# Patient Record
Sex: FEMALE | Race: WHITE | Employment: OTHER | URBAN - NONMETROPOLITAN AREA
[De-identification: names, ages, dates, MRNs, and addresses within clinical notes are randomized per-mention and may not be internally consistent; named-entity substitution may affect disease eponyms.]

---

## 2021-12-17 LAB — SARS-COV-2: DETECTED

## 2021-12-18 ENCOUNTER — APPOINTMENT (OUTPATIENT)
Dept: CT IMAGING | Age: 71
DRG: 207 | End: 2021-12-18
Payer: MEDICARE

## 2021-12-18 ENCOUNTER — HOSPITAL ENCOUNTER (INPATIENT)
Age: 71
LOS: 27 days | Discharge: NURSING, REHAB OR LONG-TERM CARE | DRG: 207 | End: 2022-01-14
Attending: EMERGENCY MEDICINE | Admitting: PEDIATRICS
Payer: MEDICARE

## 2021-12-18 ENCOUNTER — APPOINTMENT (OUTPATIENT)
Dept: GENERAL RADIOLOGY | Age: 71
DRG: 207 | End: 2021-12-18
Payer: MEDICARE

## 2021-12-18 DIAGNOSIS — U07.1 COVID-19: ICD-10-CM

## 2021-12-18 DIAGNOSIS — R09.02 HYPOXIA: Primary | ICD-10-CM

## 2021-12-18 PROBLEM — J96.01 ACUTE HYPOXEMIC RESPIRATORY FAILURE DUE TO COVID-19 (HCC): Status: ACTIVE | Noted: 2021-12-18

## 2021-12-18 LAB
ALBUMIN SERPL-MCNC: 4 G/DL (ref 3.5–5.1)
ALLEN TEST: POSITIVE
ALLEN TEST: POSITIVE
ALP BLD-CCNC: 73 U/L (ref 38–126)
ALT SERPL-CCNC: 31 U/L (ref 11–66)
ANION GAP SERPL CALCULATED.3IONS-SCNC: 18 MEQ/L (ref 8–16)
AST SERPL-CCNC: 67 U/L (ref 5–40)
ATYPICAL LYMPHOCYTES: ABNORMAL %
BASE EXCESS (CALCULATED): -2.5 MMOL/L (ref -2.5–2.5)
BASE EXCESS (CALCULATED): -3.8 MMOL/L (ref -2.5–2.5)
BASOPHILS # BLD: 0.4 %
BASOPHILS ABSOLUTE: 0 THOU/MM3 (ref 0–0.1)
BILIRUB SERPL-MCNC: 0.3 MG/DL (ref 0.3–1.2)
BUN BLDV-MCNC: 17 MG/DL (ref 7–22)
C-REACTIVE PROTEIN: 6.91 MG/DL (ref 0–1)
CALCIUM SERPL-MCNC: 8.9 MG/DL (ref 8.5–10.5)
CHLORIDE BLD-SCNC: 93 MEQ/L (ref 98–111)
CO2: 20 MEQ/L (ref 23–33)
COLLECTED BY:: ABNORMAL
COLLECTED BY:: ABNORMAL
CREAT SERPL-MCNC: 0.8 MG/DL (ref 0.4–1.2)
DEVICE: ABNORMAL
DEVICE: ABNORMAL
EOSINOPHIL # BLD: 0 %
EOSINOPHILS ABSOLUTE: 0 THOU/MM3 (ref 0–0.4)
ERYTHROCYTE [DISTWIDTH] IN BLOOD BY AUTOMATED COUNT: 12.2 % (ref 11.5–14.5)
ERYTHROCYTE [DISTWIDTH] IN BLOOD BY AUTOMATED COUNT: 38.6 FL (ref 35–45)
FERRITIN: 3338 NG/ML (ref 10–291)
GFR SERPL CREATININE-BSD FRML MDRD: 71 ML/MIN/1.73M2
GLUCOSE BLD-MCNC: 339 MG/DL (ref 70–108)
HCO3: 20 MMOL/L (ref 23–28)
HCO3: 21 MMOL/L (ref 23–28)
HCT VFR BLD CALC: 42.5 % (ref 37–47)
HEMOGLOBIN: 14.7 GM/DL (ref 12–16)
IFIO2: 4
IFIO2: 5
IMMATURE GRANS (ABS): 0.1 THOU/MM3 (ref 0–0.07)
IMMATURE GRANULOCYTES: 1.8 %
LACTIC ACID, SEPSIS: 1.3 MMOL/L (ref 0.5–1.9)
LACTIC ACID, SEPSIS: 1.7 MMOL/L (ref 0.5–1.9)
LYMPHOCYTES # BLD: 13.6 %
LYMPHOCYTES ABSOLUTE: 0.7 THOU/MM3 (ref 1–4.8)
MCH RBC QN AUTO: 29.8 PG (ref 26–33)
MCHC RBC AUTO-ENTMCNC: 34.6 GM/DL (ref 32.2–35.5)
MCV RBC AUTO: 86.2 FL (ref 81–99)
MONOCYTES # BLD: 10.2 %
MONOCYTES ABSOLUTE: 0.6 THOU/MM3 (ref 0.4–1.3)
NUCLEATED RED BLOOD CELLS: 0 /100 WBC
O2 SATURATION: 86 %
O2 SATURATION: 91 %
OSMOLALITY CALCULATION: 277.6 MOSMOL/KG (ref 275–300)
PCO2: 33 MMHG (ref 35–45)
PCO2: 33 MMHG (ref 35–45)
PH BLOOD GAS: 7.39 (ref 7.35–7.45)
PH BLOOD GAS: 7.42 (ref 7.35–7.45)
PLATELET # BLD: 128 THOU/MM3 (ref 130–400)
PMV BLD AUTO: 10.7 FL (ref 9.4–12.4)
PO2: 51 MMHG (ref 71–104)
PO2: 60 MMHG (ref 71–104)
POTASSIUM REFLEX MAGNESIUM: 4.4 MEQ/L (ref 3.5–5.2)
PRO-BNP: 207 PG/ML (ref 0–900)
RBC # BLD: 4.93 MILL/MM3 (ref 4.2–5.4)
SCAN OF BLOOD SMEAR: NORMAL
SEG NEUTROPHILS: 74 %
SEGMENTED NEUTROPHILS ABSOLUTE COUNT: 4.1 THOU/MM3 (ref 1.8–7.7)
SODIUM BLD-SCNC: 131 MEQ/L (ref 135–145)
SOURCE, BLOOD GAS: ABNORMAL
SOURCE, BLOOD GAS: ABNORMAL
TOTAL PROTEIN: 7.3 G/DL (ref 6.1–8)
TROPONIN T: < 0.01 NG/ML
WBC # BLD: 5.5 THOU/MM3 (ref 4.8–10.8)

## 2021-12-18 PROCEDURE — 36415 COLL VENOUS BLD VENIPUNCTURE: CPT

## 2021-12-18 PROCEDURE — 82728 ASSAY OF FERRITIN: CPT

## 2021-12-18 PROCEDURE — 99284 EMERGENCY DEPT VISIT MOD MDM: CPT

## 2021-12-18 PROCEDURE — 86140 C-REACTIVE PROTEIN: CPT

## 2021-12-18 PROCEDURE — 80053 COMPREHEN METABOLIC PANEL: CPT

## 2021-12-18 PROCEDURE — 71275 CT ANGIOGRAPHY CHEST: CPT

## 2021-12-18 PROCEDURE — 93005 ELECTROCARDIOGRAM TRACING: CPT | Performed by: EMERGENCY MEDICINE

## 2021-12-18 PROCEDURE — 96361 HYDRATE IV INFUSION ADD-ON: CPT

## 2021-12-18 PROCEDURE — 1200000003 HC TELEMETRY R&B

## 2021-12-18 PROCEDURE — 83605 ASSAY OF LACTIC ACID: CPT

## 2021-12-18 PROCEDURE — 96365 THER/PROPH/DIAG IV INF INIT: CPT

## 2021-12-18 PROCEDURE — 82803 BLOOD GASES ANY COMBINATION: CPT

## 2021-12-18 PROCEDURE — 2700000000 HC OXYGEN THERAPY PER DAY

## 2021-12-18 PROCEDURE — 6360000002 HC RX W HCPCS: Performed by: NURSE PRACTITIONER

## 2021-12-18 PROCEDURE — 84484 ASSAY OF TROPONIN QUANT: CPT

## 2021-12-18 PROCEDURE — 6360000004 HC RX CONTRAST MEDICATION: Performed by: NURSE PRACTITIONER

## 2021-12-18 PROCEDURE — 85025 COMPLETE CBC W/AUTO DIFF WBC: CPT

## 2021-12-18 PROCEDURE — 83880 ASSAY OF NATRIURETIC PEPTIDE: CPT

## 2021-12-18 PROCEDURE — 2580000003 HC RX 258: Performed by: NURSE PRACTITIONER

## 2021-12-18 PROCEDURE — 99222 1ST HOSP IP/OBS MODERATE 55: CPT | Performed by: PHYSICIAN ASSISTANT

## 2021-12-18 PROCEDURE — 36600 WITHDRAWAL OF ARTERIAL BLOOD: CPT

## 2021-12-18 PROCEDURE — 71045 X-RAY EXAM CHEST 1 VIEW: CPT

## 2021-12-18 RX ORDER — SODIUM CHLORIDE 0.9 % (FLUSH) 0.9 %
5-40 SYRINGE (ML) INJECTION PRN
Status: DISCONTINUED | OUTPATIENT
Start: 2021-12-18 | End: 2021-12-20 | Stop reason: SDUPTHER

## 2021-12-18 RX ORDER — ZINC SULFATE 50(220)MG
50 CAPSULE ORAL DAILY
Status: DISCONTINUED | OUTPATIENT
Start: 2021-12-19 | End: 2021-12-20

## 2021-12-18 RX ORDER — INSULIN GLARGINE 100 [IU]/ML
10 INJECTION, SOLUTION SUBCUTANEOUS DAILY
Status: DISCONTINUED | OUTPATIENT
Start: 2021-12-18 | End: 2021-12-22

## 2021-12-18 RX ORDER — SODIUM CHLORIDE 0.9 % (FLUSH) 0.9 %
5-40 SYRINGE (ML) INJECTION EVERY 12 HOURS SCHEDULED
Status: DISCONTINUED | OUTPATIENT
Start: 2021-12-18 | End: 2021-12-20

## 2021-12-18 RX ORDER — ONDANSETRON 4 MG/1
4 TABLET, ORALLY DISINTEGRATING ORAL EVERY 8 HOURS PRN
Status: DISCONTINUED | OUTPATIENT
Start: 2021-12-18 | End: 2021-12-20 | Stop reason: SDUPTHER

## 2021-12-18 RX ORDER — POLYETHYLENE GLYCOL 3350 17 G/17G
17 POWDER, FOR SOLUTION ORAL DAILY PRN
Status: DISCONTINUED | OUTPATIENT
Start: 2021-12-18 | End: 2022-01-14 | Stop reason: HOSPADM

## 2021-12-18 RX ORDER — DEXTROSE MONOHYDRATE 50 MG/ML
100 INJECTION, SOLUTION INTRAVENOUS PRN
Status: DISCONTINUED | OUTPATIENT
Start: 2021-12-18 | End: 2022-01-14 | Stop reason: HOSPADM

## 2021-12-18 RX ORDER — GUAIFENESIN/DEXTROMETHORPHAN 100-10MG/5
5 SYRUP ORAL EVERY 4 HOURS PRN
Status: DISCONTINUED | OUTPATIENT
Start: 2021-12-18 | End: 2021-12-20

## 2021-12-18 RX ORDER — ONDANSETRON 2 MG/ML
4 INJECTION INTRAMUSCULAR; INTRAVENOUS EVERY 6 HOURS PRN
Status: DISCONTINUED | OUTPATIENT
Start: 2021-12-18 | End: 2021-12-20 | Stop reason: SDUPTHER

## 2021-12-18 RX ORDER — DEXTROSE MONOHYDRATE 25 G/50ML
12.5 INJECTION, SOLUTION INTRAVENOUS PRN
Status: DISCONTINUED | OUTPATIENT
Start: 2021-12-18 | End: 2022-01-14 | Stop reason: HOSPADM

## 2021-12-18 RX ORDER — 0.9 % SODIUM CHLORIDE 0.9 %
500 INTRAVENOUS SOLUTION INTRAVENOUS ONCE
Status: COMPLETED | OUTPATIENT
Start: 2021-12-18 | End: 2021-12-18

## 2021-12-18 RX ORDER — SODIUM CHLORIDE 9 MG/ML
25 INJECTION, SOLUTION INTRAVENOUS PRN
Status: DISCONTINUED | OUTPATIENT
Start: 2021-12-18 | End: 2021-12-20

## 2021-12-18 RX ORDER — VITAMIN B COMPLEX
2000 TABLET ORAL DAILY
Status: DISCONTINUED | OUTPATIENT
Start: 2021-12-19 | End: 2021-12-20

## 2021-12-18 RX ORDER — ACETAMINOPHEN 325 MG/1
650 TABLET ORAL EVERY 6 HOURS PRN
Status: DISCONTINUED | OUTPATIENT
Start: 2021-12-18 | End: 2021-12-20 | Stop reason: SDUPTHER

## 2021-12-18 RX ORDER — ASCORBIC ACID 500 MG
500 TABLET ORAL DAILY
Status: DISCONTINUED | OUTPATIENT
Start: 2021-12-19 | End: 2021-12-20

## 2021-12-18 RX ORDER — NICOTINE POLACRILEX 4 MG
15 LOZENGE BUCCAL PRN
Status: DISCONTINUED | OUTPATIENT
Start: 2021-12-18 | End: 2022-01-14 | Stop reason: HOSPADM

## 2021-12-18 RX ORDER — ACETAMINOPHEN 650 MG/1
650 SUPPOSITORY RECTAL EVERY 6 HOURS PRN
Status: DISCONTINUED | OUTPATIENT
Start: 2021-12-18 | End: 2021-12-20 | Stop reason: SDUPTHER

## 2021-12-18 RX ORDER — ALBUTEROL SULFATE 90 UG/1
2 AEROSOL, METERED RESPIRATORY (INHALATION) EVERY 6 HOURS PRN
Status: DISCONTINUED | OUTPATIENT
Start: 2021-12-18 | End: 2022-01-14 | Stop reason: HOSPADM

## 2021-12-18 RX ORDER — SODIUM CHLORIDE 9 MG/ML
INJECTION, SOLUTION INTRAVENOUS CONTINUOUS
Status: DISCONTINUED | OUTPATIENT
Start: 2021-12-18 | End: 2021-12-19

## 2021-12-18 RX ORDER — DEXAMETHASONE 4 MG/1
6 TABLET ORAL DAILY
Status: DISCONTINUED | OUTPATIENT
Start: 2021-12-19 | End: 2021-12-19

## 2021-12-18 RX ADMIN — IOPAMIDOL 80 ML: 755 INJECTION, SOLUTION INTRAVENOUS at 14:46

## 2021-12-18 RX ADMIN — DEXAMETHASONE SODIUM PHOSPHATE 20 MG: 4 INJECTION INTRA-ARTICULAR; INTRALESIONAL; INTRAMUSCULAR; INTRAVENOUS; SOFT TISSUE at 14:54

## 2021-12-18 RX ADMIN — SODIUM CHLORIDE 500 ML: 9 INJECTION, SOLUTION INTRAVENOUS at 13:54

## 2021-12-18 ASSESSMENT — ENCOUNTER SYMPTOMS
COLOR CHANGE: 0
NAUSEA: 0
WHEEZING: 0
DIARRHEA: 0
SHORTNESS OF BREATH: 1
CONSTIPATION: 0
VOMITING: 0
RHINORRHEA: 0
ABDOMINAL DISTENTION: 0
COUGH: 1
SORE THROAT: 0

## 2021-12-18 NOTE — ED TRIAGE NOTES
Pt to the ED with c/o low O2. Pt states she tested positive for COVID yesterday. States she went to [facility] ED where they wanted to admit her. States she refused. Pt states today her O2 sats were low so she came back. Upon arrival the pt appears SOB and breathing is labored. Pt O2 sat is 80% on room air after ambulating. Pt placed on 4LNC. O2 sat increased to 92%. EKG complete.

## 2021-12-18 NOTE — ED NOTES
Pt resting in bed. Requesting food at current time. Oxygen placed up to 5L. Pt dropping to 89% while talking.  Will monitor      Ector Kussmaul, RN  12/18/21 5212

## 2021-12-18 NOTE — ED PROVIDER NOTES
[facility] [address] [address] EMERGENCY DEPT  Faculty Attestation    I performed a history and physical examination of the patient and discussed management with the resident. I reviewed the residents note and agree with the documented findings and plan of care. Any areas of disagreement are noted on the chart. I was personally present for the key portions of any procedures and the inclusive time noted in any critical care statement. I have documented in the chart those procedures where I was not present during the key portions. I have reviewed the emergency nurses triage note. I agree with the chief complaint, past medical history, past surgical history, allergies, medications, social, and family history as documented unless otherwise noted below.        This is a 14-year-old female who presents to the emergency department for repeat evaluation  Patient was at an outside facility yesterday  She went there because she has not been feeling well for about 10 days  She describes a poor appetite, intermittent nausea, loose stool, and generalized fatigue  No focal neurologic deficits  Notes possible fever  She was diagnosed with Covid pneumonia yesterday as well as hypoxia  She refused to stay or be admitted  She states that she has not happy with her care there  Today she states her appetite has improved  She stopped at a local fast food restaurant and had a 'Kewpie special' prior to arrival  Denies any chest or abdominal discomfort  Continues to have a dry cough  No wheezing  No chest pain   No palpitations  No lower extremity edema  No genitourinary complaints  Review of systems otherwise negative  She has no additional concerns    On examination she appears in no acute distress  Heart is regular in rate and rhythm  Lungs show mild coarseness to breath sounds  Mild tachypnea  Mild conversational dyspnea  No retractions  Abdomen obese  Abdomen soft and nontender  Hyperactive bowel sounds  Normal peripheral perfusion and skin turgor  No pretibial pitting edema  No gross sensory or motor deficits    EKG at 1159 shows a sinus rhythm with rate of 80 bpm.  Intervals within normal limits. Normal axis. Poor R wave progression. No T wave inversion. No ST segment depression/elevation or evidence for acute ischemia/infarction.     Patient hypoxic and placed on oxygen with improved O2 sats and work of breathing    Imaging reviewed  Labs reviewed    Patient updated on all results  Patient will be admitted    Critical care time of 15 minutes      Fernando Hightower DO  Attending Emergency Physician        Sara Hathaway DO  12/18/21 5464

## 2021-12-19 LAB
EKG ATRIAL RATE: 88 BPM
EKG P AXIS: 47 DEGREES
EKG P-R INTERVAL: 140 MS
EKG Q-T INTERVAL: 390 MS
EKG QRS DURATION: 82 MS
EKG QTC CALCULATION (BAZETT): 471 MS
EKG R AXIS: 37 DEGREES
EKG T AXIS: 44 DEGREES
EKG VENTRICULAR RATE: 88 BPM
GLUCOSE BLD-MCNC: 482 MG/DL (ref 70–108)

## 2021-12-19 PROCEDURE — 82948 REAGENT STRIP/BLOOD GLUCOSE: CPT

## 2021-12-19 PROCEDURE — 6360000002 HC RX W HCPCS: Performed by: PHYSICIAN ASSISTANT

## 2021-12-19 PROCEDURE — 94660 CPAP INITIATION&MGMT: CPT

## 2021-12-19 PROCEDURE — 6360000002 HC RX W HCPCS: Performed by: NURSE PRACTITIONER

## 2021-12-19 PROCEDURE — 2580000003 HC RX 258: Performed by: NURSE PRACTITIONER

## 2021-12-19 PROCEDURE — 2580000003 HC RX 258: Performed by: PHYSICIAN ASSISTANT

## 2021-12-19 PROCEDURE — 2060000000 HC ICU INTERMEDIATE R&B

## 2021-12-19 PROCEDURE — 99232 SBSQ HOSP IP/OBS MODERATE 35: CPT | Performed by: NURSE PRACTITIONER

## 2021-12-19 PROCEDURE — 1200000003 HC TELEMETRY R&B

## 2021-12-19 PROCEDURE — 6370000000 HC RX 637 (ALT 250 FOR IP): Performed by: PHYSICIAN ASSISTANT

## 2021-12-19 RX ADMIN — Medication 2000 UNITS: at 09:38

## 2021-12-19 RX ADMIN — SODIUM CHLORIDE, PRESERVATIVE FREE 10 ML: 5 INJECTION INTRAVENOUS at 21:19

## 2021-12-19 RX ADMIN — ENOXAPARIN SODIUM 30 MG: 100 INJECTION SUBCUTANEOUS at 09:40

## 2021-12-19 RX ADMIN — INSULIN LISPRO 6 UNITS: 100 INJECTION, SOLUTION INTRAVENOUS; SUBCUTANEOUS at 16:51

## 2021-12-19 RX ADMIN — BARICITINIB 4 MG: 2 TABLET, FILM COATED ORAL at 00:22

## 2021-12-19 RX ADMIN — BARICITINIB 4 MG: 2 TABLET, FILM COATED ORAL at 22:24

## 2021-12-19 RX ADMIN — DEXAMETHASONE SODIUM PHOSPHATE 14 MG: 4 INJECTION INTRA-ARTICULAR; INTRALESIONAL; INTRAMUSCULAR; INTRAVENOUS; SOFT TISSUE at 09:46

## 2021-12-19 RX ADMIN — ENOXAPARIN SODIUM 30 MG: 100 INJECTION SUBCUTANEOUS at 21:18

## 2021-12-19 RX ADMIN — OXYCODONE HYDROCHLORIDE AND ACETAMINOPHEN 500 MG: 500 TABLET ORAL at 09:39

## 2021-12-19 RX ADMIN — SODIUM CHLORIDE, PRESERVATIVE FREE 10 ML: 5 INJECTION INTRAVENOUS at 00:25

## 2021-12-19 RX ADMIN — SODIUM CHLORIDE: 9 INJECTION, SOLUTION INTRAVENOUS at 00:29

## 2021-12-19 RX ADMIN — INSULIN GLARGINE 10 UNITS: 100 INJECTION, SOLUTION SUBCUTANEOUS at 08:03

## 2021-12-19 RX ADMIN — SODIUM CHLORIDE, PRESERVATIVE FREE 10 ML: 5 INJECTION INTRAVENOUS at 09:41

## 2021-12-19 RX ADMIN — ENOXAPARIN SODIUM 30 MG: 100 INJECTION SUBCUTANEOUS at 00:25

## 2021-12-19 RX ADMIN — INSULIN LISPRO 5 UNITS: 100 INJECTION, SOLUTION INTRAVENOUS; SUBCUTANEOUS at 12:09

## 2021-12-19 RX ADMIN — INSULIN LISPRO 4 UNITS: 100 INJECTION, SOLUTION INTRAVENOUS; SUBCUTANEOUS at 08:05

## 2021-12-19 RX ADMIN — Medication 50 MG: at 09:39

## 2021-12-19 RX ADMIN — INSULIN LISPRO 5 UNITS: 100 INJECTION, SOLUTION INTRAVENOUS; SUBCUTANEOUS at 16:53

## 2021-12-19 NOTE — ED NOTES
Patient transported to [facility]  by cart in stable condition. Patient monitored on cardiac telemetry. Patient on4  LPM O2 via nasal canula. IV  line is patent.    Full PP precautions       Nataly Tolliver, EMT-P  12/18/21 2024

## 2021-12-19 NOTE — H&P
Hospitalist History and Physical          Patient: David Turk  : 1950  MRN: [id number]     Acct: [de-identified]    PCP: Bar Millan  Date of Admission: 2021  Date of Service: Pt seen/examined on 21  and Admitted to Inpatient with expected LOS greater than two midnights due to medical therapy. Assessment/Plan:    Acute hypoxemic respiratory failure 2/2 COVID19   Currently on 6 Liters NC  CTA chest and CXR consist with COVID19 PNA   Elevated inflammatory markers CRP 6.91 , ferritin >3000  vitamin C, vitamin D and zinc   Decadron 6 mg    baricitnib 4 mg daily  IS, acapella, and albuterol       Type II diabetes hyperglycemia   PO medications held   - goal 140 - 180   Lantus 10 units daily, 5 units humalog with low SSI   No A1C on file- ordered   hypoglycemia protocol   4 carb choice diet     Thrombocytopenia  Platelets 291  Monitor with CBC     CKD stage II  GFR 71- monitor     CAD  Hx of stent placement   Unclear what medications currently on     Essential hypertension  Resume home medications-  need to be reconciled   PRN hydralazine    Class II obesity   BMI 37.73 kg/m2    =======================================================================      Chief Complaint:  Asymptomatic Hypoxia     History Of Present Illness:  David Turk is a 73 y.o. female with PMHx of CAD, essential hypertension, type II diabetes, who presents to [address] [place] [address] with shortness of breath. Patient states she began having symptoms on  of loss of appetite, changes in taste and smell, food aversions, nausea with dry heaves and diarrhea. she originally attributed these symptoms to changes in her diabetic medications. As symptoms persisted, patient states she was advised by her PCP to go to ED at [facility] for workup and found to be COVID positive with hypoxia 70% spo2.    She decided to leave [facility] per their recommendation she be admitted to their facility as she preferred to be admitted to [facility] for treatment and family close by. She states she has felt okay in spite of measured hypoxia. She denies fevers, chills, chest pain, dizziness, numbness or tingling, abdominal pain          ED course:   Vitals on arrival 98.5F, RR 28, HR 95, /77  Labs: Lactic acid, CBC, ABG, Ferritin, CRP   EKG, CXR, CTA chest    Past Medical History:    No past medical history on file. Past Surgical History:    No past surgical history on file. Medications Prior to Admission:   Prior to Admission medications    Not on File       Allergies:  Patient has no known allergies. Social History:    The patient currently lives at home. Tobacco use:   has no history on file for tobacco use. Alcohol use:   has no history on file for alcohol use. Drug use:  has no history on file for drug use. Family History:  as follows:  No family history on file. Review of Systems:   Pertinent positives and negatives as noted in the HPI. All other systems reviewed and negative. Physical Exam:    /60   Pulse 74   Temp 98.5 °F (36.9 °C)   Resp 24   Wt 204 lb (92.5 kg)   SpO2 93%       General appearance: No apparent distress, well developed, appears stated age. Eyes:  Pupils equal, round, and reactive to light. Conjunctivae/corneas clear. HENT: Head normal in appearance. External nares normal.  Oral mucosa moist without lesions. Hearing grossly intact. Neck: Supple, with full range of motion. Trachea midline. No gross JVD appreciated. Respiratory: 2L NC Normal respiratory effort. Mild wheezing and crackles diminished breath sounds  Cardiovascular: Normal rate, regular rhythm with normal S1/S2 without murmurs. No lower extremity edema. Abdomen: Soft, non-tender, non-distended with normal bowel sounds. Musculoskeletal: There is no joint swelling or tenderness. Normal tone. No abnormal movements. Skin: Warm and dry. No rashes or lesions.   Neurologic:  No focal sensory/motor deficits in the upper and lower extremities. Cranial nerves:  grossly non-focal 2-12. Psychiatric: Alert and oriented, normal insight and thought content. Capillary Refill: Brisk,< 3 seconds. Peripheral Pulses: +2 palpable, equal bilaterally. Labs:     Recent Labs     12/18/21  1215   WBC 5.5   HGB 14.7   HCT 42.5   *     Recent Labs     12/18/21  1215   *   K 4.4   CL 93*   CO2 20*   BUN 17   CREATININE 0.8   CALCIUM 8.9     Recent Labs     12/18/21  1215   AST 67*   ALT 31   BILITOT 0.3   ALKPHOS 73     No results for input(s): INR in the last 72 hours. No results for input(s): Meldon Becerra in the last 72 hours. No results found for: Karely Speedy, BACTERIA, RBCUA, BLOODU, [place], GLUCOSEU      Radiology:     CTA CHEST W WO CONTRAST   Final Result      1. No evidence of pulmonary embolism. 2. Areas of abnormal density throughout both lung fields consistent with inflammatory process, presumably Covid 19 infection. 3. Mild cardiomegaly. 4. Mild dilatation of the ascending aorta. 5. Thoracic spondylosis. 6. Hiatal hernia. 7. Hepatosplenomegaly. **This report has been created using voice recognition software. It may contain minor errors which are inherent in voice recognition technology. **      Final report electronically signed by DR April Mcelroy on 12/18/2021 3:02 PM      XR CHEST PORTABLE   Final Result   1. Diffuse groundglass opacification of both lungs with areas of interstitial thickening and nodular consolidative opacities. Findings likely relate to multilobar pneumonia. Pulmonary edema could also give this appearance. **This report has been created using voice recognition software. It may contain minor errors which are inherent in voice recognition technology. **      Final report electronically signed by Dr Shaka Braun on 12/18/2021 2:20 PM             EKG:  I have reviewed the EKG with the following interpretation: normal sinus rhythm      PT/OT

## 2021-12-19 NOTE — PROGRESS NOTES
Hospitalist Progress Note    Patient:  Tone Vera      Unit/Bed:8B-26/026-A    YOB: 1950    MRN: [id number]       Acct: [de-identified]     PCP: Jesse Jasso    Date of Admission: 12/18/2021    Assessment/Plan:    1. Pneumonia secondary to COVID-19 infection--CRP noted to be 6.91; baricitinib 12/19; was given Decadron 20 mg IV x1 dose on 12/18 and converted to 6 mg daily on 12/19 however since the patient is currently on high flow we'll convert to high-dose prednisone and given additional 14 mg today; on vitamin C, vitamin D and zinc; has Acapella and incentive spirometry; instructed on self proning  2. Acute hypoxic respiratory failure--on high flow oxygen; wean as able, has Acapella and incentive spirometry; CTA of the chest done and no PE  3. Hyponatremia, mild--monitor; check in the morning; stop IV fluids  4. Diabetes mellitus type 2, uncontrolled--Lantus along with low-dose sliding scale; also uses 5 units Premeal Humalog; monitor  5. Thrombocytopenia, mild--likely viral etiology, monitor with #6  6. Hepatosplenomegaly  7. Obesity with BMI 37.73      Expected discharge date: Pending clinical course    Disposition:    [x] Home       [] TCU       [] Rehab       [] Psych       [] SNF       [] [place]       [] Other-    Chief Complaint: Shortness of breath    Hospital Course: Per H&P dated 12/18: Faye Blackman is a 73 y.o. female with PMHx of CAD, essential hypertension, type II diabetes, who presents to [facility] with shortness of breath. Patient states she began having symptoms on 12/12 of loss of appetite, changes in taste and smell, food aversions, nausea with dry heaves and diarrhea. she originally attributed these symptoms to changes in her diabetic medications. As symptoms persisted, patient states she was advised by her PCP to go to ED at [facility] for workup and found to be COVID positive with hypoxia 70% spo2.    She decided to leave [facility] per their recommendation she be admitted to their facility as she preferred to be admitted to [facility] for treatment and family close by. She states she has felt okay in spite of measured hypoxia. She denies fevers, chills, chest pain, dizziness, numbness or tingling, abdominal pain      ED course:   Vitals on arrival 98.5F, RR 28, HR 95, /77  Labs: Lactic acid, CBC, ABG, Ferritin, CRP   EKG, CXR, CTA chest\"    12/19--> now on high flow however settings not documented as patient desatted to 83% on 6 L; stop IV fluids     Subjective (past 24 hours): Sitting up eating, relates to diarrhea the past 2 days, denies pain    Medications:  Reviewed    Infusion Medications    sodium chloride      sodium chloride 75 mL/hr at 12/19/21 0029    dextrose       Scheduled Medications    sodium chloride flush  5-40 mL IntraVENous 2 times per day    enoxaparin  30 mg SubCUTAneous Q12H    Vitamin D  2,000 Units Oral Daily    ascorbic acid  500 mg Oral Daily    zinc sulfate  50 mg Oral Daily    dexamethasone  6 mg Oral Daily    insulin glargine  10 Units SubCUTAneous Daily    insulin lispro  0-6 Units SubCUTAneous TID WC    insulin lispro  0-3 Units SubCUTAneous Nightly    insulin lispro  5 Units SubCUTAneous Once    Followed by    insulin lispro  5 Units SubCUTAneous TID WC    baricitinib  4 mg Oral Nightly     PRN Meds: sodium chloride flush, sodium chloride, ondansetron **OR** ondansetron, polyethylene glycol, acetaminophen **OR** acetaminophen, guaiFENesin-dextromethorphan, glucose, dextrose, glucagon (rDNA), dextrose, albuterol sulfate HFA      Intake/Output Summary (Last 24 hours) at 12/19/2021 0750  Last data filed at 12/19/2021 0422  Gross per 24 hour   Intake 240 ml   Output --   Net 240 ml       Diet:  ADULT DIET;  Regular; 4 carb choices (60 gm/meal)    Exam:  /73   Pulse 78   Temp 98.5 °F (36.9 °C) (Oral)   Resp 22   Ht 5' 2\" (1.575 m)   Wt 206 lb 4.8 oz (93.6 kg)   SpO2 94%   BMI 37.73 kg/m²     General appearance: No apparent distress, appears stated age and cooperative. HEENT: Pupils equal, round, and reactive to light. Conjunctivae/corneas clear. Neck: Supple, with full range of motion. No jugular venous distention. Trachea midline. Respiratory:  Normal respiratory effort. Clear to auscultation, bilaterally without Rales/Wheezes/Rhonchi. Speaks in complete sentences  Cardiovascular: Regular rate and rhythm with normal S1/S2 without murmurs, rubs or gallops. Abdomen: Soft, non-tender, non-distended with normal bowel sounds. Obese  Musculoskeletal: passive and active ROM x 4 extremities. Skin: Skin color, texture, turgor normal.    Neurologic:  Neurovascularly intact without any focal sensory/motor deficits. Cranial nerves: II-XII intact, grossly non-focal.  Psychiatric: Alert and oriented, thought content appropriate  Capillary Refill: Brisk,< 3 seconds   Peripheral Pulses: +2 palpable, equal bilaterally       Labs:   Recent Labs     12/18/21  1215   WBC 5.5   HGB 14.7   HCT 42.5   *     Recent Labs     12/18/21  1215   *   K 4.4   CL 93*   CO2 20*   BUN 17   CREATININE 0.8   CALCIUM 8.9     Recent Labs     12/18/21  1215   AST 67*   ALT 31   BILITOT 0.3   ALKPHOS 73     Microbiology:    COVID-19 positive on 12/18 at [facility]    Radiology:  CTA CHEST W WO CONTRAST    Result Date: 12/18/2021  PROCEDURE: CTA CHEST W WO CONTRAST CLINICAL INFORMATION: shortness of breath. COMPARISON: No prior study. TECHNIQUE: 3 mm axial images were obtained through the chest after the administration of IV contrast.  A non-contrast localizer was obtained. 3D reconstructions were performed on the scanner to include MIP coronal and sagittal images through the chest. Isovue was the intravenous contrast utilized.  All CT scans at this facility use dose modulation, iterative reconstruction, and/or weight-based dosing when appropriate to reduce radiation dose to as low as reasonably achievable. FINDINGS: There is adequate opacification of the pulmonary arterial system. No pulmonary emboli are present. There is atherosclerotic calcification in the aortic arch. . There is mild dilatation of the ascending aorta which measures 3.1 cm in diameter. There is mild cardiomegaly. There is no pericardial or pleural effusion. There is no mediastinal, axillary or hilar adenopathy. There are areas of abnormal density throughout both lung fields consistent with inflammatory process, presumably Covid 19 infection. . There is thoracic spondylosis. .  There is hepatosplenomegaly. .     1. No evidence of pulmonary embolism. 2. Areas of abnormal density throughout both lung fields consistent with inflammatory process, presumably Covid 19 infection. 3. Mild cardiomegaly. 4. Mild dilatation of the ascending aorta. 5. Thoracic spondylosis. 6. Hiatal hernia. 7. Hepatosplenomegaly. **This report has been created using voice recognition software. It may contain minor errors which are inherent in voice recognition technology. ** Final report electronically signed by DR Talon Atwood on 12/18/2021 3:02 PM    XR CHEST PORTABLE    Result Date: 12/18/2021  PROCEDURE: XR CHEST PORTABLE CLINICAL INFORMATION: SOB COMPARISON: None TECHNIQUE: AP portable chest radiograph performed. FINDINGS: Diffuse groundglass opacification of both lungs with areas of interstitial thickening and nodular consolidative opacities. Cardiac silhouette is mildly enlarged. No pleural effusion. No pneumothorax. No acute bony abnormality. Degenerative changes of the thoracic spine. 1. Diffuse groundglass opacification of both lungs with areas of interstitial thickening and nodular consolidative opacities. Findings likely relate to multilobar pneumonia. Pulmonary edema could also give this appearance. **This report has been created using voice recognition software. It may contain minor errors which are inherent in voice recognition technology. ** Final report electronically signed by Dr Gem Eden on 12/18/2021 2:20 PM      DVT prophylaxis: [x] Lovenox                                 [] SCDs                                 [] SQ Heparin                                 [] Encourage ambulation           [] Already on Anticoagulation     Code Status: Full Code    PT/OT Eval Status: Monitor    Tele:   [x] yes sinus rhythm heart rate 81             [] no    Active Hospital Problems    Diagnosis Date Noted    Acute hypoxemic respiratory failure due to COVID-19 [facility]) [U07.1, J96.01] 12/18/2021       Electronically signed by WYATT Hinds CNP on 12/19/2021 at 7:50 AM

## 2021-12-19 NOTE — ED PROVIDER NOTES
[facility] ENCOUNTER          Pt Name: Merlin Barrs  MRN: [id number]  [place] 1950  Date of evaluation: 12/18/2021  Treating Resident Physician: Dolly Mtz MD  Supervising Physician: Latrice Douglas DO    CHIEF COMPLAINT       Chief Complaint   Patient presents with    Other     low O2     History obtained from the patient. HISTORY OF PRESENT ILLNESS    HPI  Merlin Barrs is a 73 y.o. female who presents to the emergency department for evaluation of this of breath. Patient states that she has been short of breath for the last 7 days but has become increasingly worse for the last 2 days. Patient states she was seen at another hospital yesterday and diagnosed with COVID-19. Patient states that they initially went to tell her to be admitted but she refused and left [address] Patient states that she continues to be short of breath today. Patient on arrival had a pulse ox of 70% on room air. She denies any chest pain or syncope. The patient has no other acute complaints at this time. REVIEW OF SYSTEMS   Review of Systems   Constitutional: Positive for fatigue. Negative for fever. HENT: Negative for ear pain, rhinorrhea and sore throat. Respiratory: Positive for cough and shortness of breath. Negative for wheezing. Cardiovascular: Negative for chest pain, palpitations and leg swelling. Gastrointestinal: Negative for abdominal distention, constipation, diarrhea, nausea and vomiting. Endocrine: Negative for polydipsia and polyuria. Genitourinary: Negative for difficulty urinating and dysuria. Musculoskeletal: Negative for arthralgias. Skin: Negative for color change, pallor and rash. Neurological: Negative for dizziness, seizures, syncope, weakness and numbness. PAST MEDICAL AND SURGICAL HISTORY   No past medical history on file. No past surgical history on file.       MEDICATIONS   No current facility-administered medications for this encounter. No current outpatient medications on file. SOCIAL HISTORY     Social History     Social History Narrative    Not on file     Social History     Tobacco Use    Smoking status: Not on file    Smokeless tobacco: Not on file   Substance Use Topics    Alcohol use: Not on file    Drug use: Not on file         ALLERGIES   No Known Allergies      FAMILY HISTORY   No family history on file. PREVIOUS RECORDS   Previous records reviewed: today    PHYSICAL EXAM     ED Triage Vitals [12/18/21 1201]   BP Temp Temp src Pulse Resp SpO2 Height Weight   135/77 98.5 °F (36.9 °C) -- 95 28 (S) (!) 80 % -- 204 lb (92.5 kg)     Initial vital signs and nursing assessment reviewed and normal. There is no height or weight on file to calculate BMI. Pulsoximetry is abnormal per my interpretation. Additional Vital Signs:  Vitals:    12/18/21 1926   BP: 108/60   Pulse: 74   Resp: 24   Temp:    SpO2: 93%       Physical Exam  Constitutional:       Appearance: Normal appearance. HENT:      Head: Normocephalic. Right Ear: External ear normal.      Left Ear: External ear normal.      Nose: Nose normal.      Mouth/Throat:      Mouth: Mucous membranes are moist.      Pharynx: Oropharynx is clear. Eyes:      Extraocular Movements: Extraocular movements intact. Conjunctiva/sclera: Conjunctivae normal.      Pupils: Pupils are equal, round, and reactive to light. Cardiovascular:      Rate and Rhythm: Normal rate and regular rhythm. Pulses: Normal pulses. Heart sounds: Normal heart sounds. Pulmonary:      Breath sounds: Wheezing present. Abdominal:      General: Bowel sounds are normal.      Palpations: Abdomen is soft. Musculoskeletal:         General: Normal range of motion. Cervical back: Normal range of motion and neck supple. Skin:     General: Skin is warm and dry. Capillary Refill: Capillary refill takes less than 2 seconds. Neurological:      General: No focal deficit present. Mental Status: She is alert and oriented to person, place, and time. MEDICAL DECISION MAKING   Initial Assessment:   Patient is a 72-year-old female with recent diagnosis of COVID-19 who presents today with complaint of shortness of breath. Patient on arrival had a pulse ox of 70% requiring O2 on nonrebreather and then titrating down to O2 at 6 L per nasal cannula. Patient differential diagnosis includes but is not limited to COVID-19, viral illness, pneumonia, PE, acute respiratory distress with hypoxia.   Plan:   Labs  Chest x-ray  EKG  O2 to maintain pulse ox greater than 94% on room air  ABG        ED RESULTS   Laboratory results:  Labs Reviewed   CBC WITH AUTO DIFFERENTIAL - Abnormal; Notable for the following components:       Result Value    Platelets 176 (*)     Lymphocytes Absolute 0.7 (*)     Immature Grans (Abs) 0.10 (*)     All other components within normal limits   COMPREHENSIVE METABOLIC PANEL W/ REFLEX TO MG FOR LOW K - Abnormal; Notable for the following components:    Glucose 339 (*)     Sodium 131 (*)     Chloride 93 (*)     CO2 20 (*)     AST 67 (*)     All other components within normal limits   ANION GAP - Abnormal; Notable for the following components:    Anion Gap 18.0 (*)     All other components within normal limits   GLOMERULAR FILTRATION RATE, ESTIMATED - Abnormal; Notable for the following components:    Est, Glom Filt Rate 71 (*)     All other components within normal limits   C-REACTIVE PROTEIN - Abnormal; Notable for the following components:    CRP 6.91 (*)     All other components within normal limits   BLOOD GAS, ARTERIAL - Abnormal; Notable for the following components:    PCO2 33 (*)     PO2 51 (*)     HCO3 21 (*)     All other components within normal limits   FERRITIN - Abnormal; Notable for the following components:    Ferritin 3,338 (*)     All other components within normal limits   BLOOD GAS, ARTERIAL - Abnormal; Notable for the following components:    PCO2 33 (*)     PO2 60 (*)     HCO3 20 (*)     Base Excess (Calculated) -3.8 (*)     All other components within normal limits   TROPONIN   LACTATE, SEPSIS   LACTATE, SEPSIS   OSMOLALITY   BRAIN NATRIURETIC PEPTIDE   SCAN OF BLOOD SMEAR       Radiologic studies results:  CTA CHEST W WO CONTRAST   Final Result      1. No evidence of pulmonary embolism. 2. Areas of abnormal density throughout both lung fields consistent with inflammatory process, presumably Covid 19 infection. 3. Mild cardiomegaly. 4. Mild dilatation of the ascending aorta. 5. Thoracic spondylosis. 6. Hiatal hernia. 7. Hepatosplenomegaly. **This report has been created using voice recognition software. It may contain minor errors which are inherent in voice recognition technology. **      Final report electronically signed by DR Linda Tamez on 12/18/2021 3:02 PM      XR CHEST PORTABLE   Final Result   1. Diffuse groundglass opacification of both lungs with areas of interstitial thickening and nodular consolidative opacities. Findings likely relate to multilobar pneumonia. Pulmonary edema could also give this appearance. **This report has been created using voice recognition software. It may contain minor errors which are inherent in voice recognition technology. **      Final report electronically signed by Dr Osman Cormier on 12/18/2021 2:20 PM          ED Medications administered this visit:   Medications   0.9 % sodium chloride bolus (0 mLs IntraVENous Stopped 12/18/21 1554)   dexamethasone (DECADRON) 20 mg in sodium chloride 0.9 % IVPB (0 mg IntraVENous Stopped 12/18/21 1524)   iopamidol (ISOVUE-370) 76 % injection 80 mL (80 mLs IntraVENous Given 12/18/21 1446)         ED COURSE        Patient on room air is only maintaining a pulse ox of 80% patient requiring O2 at 6 L at this time. Patient CTA of chest showed no PE.   Patient symptoms are secondary to COVID-19 and chest x-ray findings are consistent with this. Patient will be admitted to hospital at this time for further evaluation and treatment. MEDICATION CHANGES     New Prescriptions    No medications on file         FINAL DISPOSITION     Final diagnoses:   Hypoxia   COVID-19     Condition: condition: fair  Dispo: Admit to telemetry      This transcription was electronically signed. Parts of this transcriptions may have been dictated by use of voice recognition software and electronically transcribed, and parts may have been transcribed with the assistance of an ED scribe. The transcription may contain errors not detected in proofreading. Please refer to my supervising physician's documentation if my documentation differs.     Electronically Signed: Lani Ryan MD, 12/18/21, 8:21 PM         Lani Ryan MD  Resident  12/18/21 2174

## 2021-12-19 NOTE — PROGRESS NOTES
Patient alert and oriented. Displays of anxiety throughout the shift. No voiced complaints of pain or discomfort.   Updates given to family member

## 2021-12-20 ENCOUNTER — APPOINTMENT (OUTPATIENT)
Dept: GENERAL RADIOLOGY | Age: 71
DRG: 207 | End: 2021-12-20
Payer: MEDICARE

## 2021-12-20 LAB
ALBUMIN SERPL-MCNC: 3.5 G/DL (ref 3.5–5.1)
ALLEN TEST: POSITIVE
ALP BLD-CCNC: 71 U/L (ref 38–126)
ALT SERPL-CCNC: 22 U/L (ref 11–66)
ANION GAP SERPL CALCULATED.3IONS-SCNC: 12 MEQ/L (ref 8–16)
ANION GAP SERPL CALCULATED.3IONS-SCNC: 15 MEQ/L (ref 8–16)
AST SERPL-CCNC: 40 U/L (ref 5–40)
BACTERIA: ABNORMAL
BASE EXCESS (CALCULATED): 1.7 MMOL/L (ref -2.5–2.5)
BILIRUB SERPL-MCNC: 0.3 MG/DL (ref 0.3–1.2)
BILIRUBIN URINE: NEGATIVE
BLOOD, URINE: NEGATIVE
BUN BLDV-MCNC: 21 MG/DL (ref 7–22)
BUN BLDV-MCNC: 21 MG/DL (ref 7–22)
C-REACTIVE PROTEIN: 5.9 MG/DL (ref 0–1)
C-REACTIVE PROTEIN: 7.91 MG/DL (ref 0–1)
CALCIUM SERPL-MCNC: 9.1 MG/DL (ref 8.5–10.5)
CALCIUM SERPL-MCNC: 9.1 MG/DL (ref 8.5–10.5)
CASTS: ABNORMAL /LPF
CASTS: ABNORMAL /LPF
CHARACTER, URINE: CLEAR
CHLORIDE BLD-SCNC: 106 MEQ/L (ref 98–111)
CHLORIDE BLD-SCNC: 106 MEQ/L (ref 98–111)
CO2: 22 MEQ/L (ref 23–33)
CO2: 23 MEQ/L (ref 23–33)
COLLECTED BY:: ABNORMAL
COLOR: YELLOW
CREAT SERPL-MCNC: 0.6 MG/DL (ref 0.4–1.2)
CREAT SERPL-MCNC: 0.7 MG/DL (ref 0.4–1.2)
CRYSTALS: ABNORMAL
DEVICE: ABNORMAL
EPITHELIAL CELLS, UA: ABNORMAL /HPF
ERYTHROCYTE [DISTWIDTH] IN BLOOD BY AUTOMATED COUNT: 12.4 % (ref 11.5–14.5)
ERYTHROCYTE [DISTWIDTH] IN BLOOD BY AUTOMATED COUNT: 40 FL (ref 35–45)
FERRITIN: 2775 NG/ML (ref 10–291)
GFR SERPL CREATININE-BSD FRML MDRD: 83 ML/MIN/1.73M2
GFR SERPL CREATININE-BSD FRML MDRD: > 90 ML/MIN/1.73M2
GLUCOSE BLD-MCNC: 241 MG/DL (ref 70–108)
GLUCOSE BLD-MCNC: 258 MG/DL (ref 70–108)
GLUCOSE BLD-MCNC: 280 MG/DL (ref 70–108)
GLUCOSE BLD-MCNC: 281 MG/DL (ref 70–108)
GLUCOSE BLD-MCNC: 299 MG/DL (ref 70–108)
GLUCOSE BLD-MCNC: 365 MG/DL (ref 70–108)
GLUCOSE BLD-MCNC: 398 MG/DL (ref 70–108)
GLUCOSE, URINE: 500 MG/DL
HCO3: 27 MMOL/L (ref 23–28)
HCT VFR BLD CALC: 40.3 % (ref 37–47)
HEMOGLOBIN: 13.6 GM/DL (ref 12–16)
IFIO2: 100
KETONES, URINE: ABNORMAL
LD: 874 U/L (ref 100–190)
LEUKOCYTE EST, POC: NEGATIVE
MCH RBC QN AUTO: 29.9 PG (ref 26–33)
MCHC RBC AUTO-ENTMCNC: 33.7 GM/DL (ref 32.2–35.5)
MCV RBC AUTO: 88.6 FL (ref 81–99)
MISCELLANEOUS LAB TEST RESULT: ABNORMAL
MODE: ABNORMAL
NITRITE, URINE: NEGATIVE
O2 SATURATION: 99 %
PCO2: 43 MMHG (ref 35–45)
PH BLOOD GAS: 7.4 (ref 7.35–7.45)
PH UA: 5.5 (ref 5–9)
PLATELET # BLD: 144 THOU/MM3 (ref 130–400)
PMV BLD AUTO: 10.3 FL (ref 9.4–12.4)
PO2: 117 MMHG (ref 71–104)
POTASSIUM REFLEX MAGNESIUM: 5.5 MEQ/L (ref 3.5–5.2)
POTASSIUM SERPL-SCNC: 4.6 MEQ/L (ref 3.5–5.2)
POTASSIUM SERPL-SCNC: 5.2 MEQ/L (ref 3.5–5.2)
PROCALCITONIN: 0.06 NG/ML (ref 0.01–0.09)
PROCALCITONIN: 0.06 NG/ML (ref 0.01–0.09)
PROTEIN UA: 30 MG/DL
RBC # BLD: 4.55 MILL/MM3 (ref 4.2–5.4)
RBC URINE: ABNORMAL /HPF
RENAL EPITHELIAL, UA: ABNORMAL
SET PEEP: 16 MMHG
SET PRESS SUPP: 20 CMH2O
SET RESPIRATORY RATE: 16 BPM
SODIUM BLD-SCNC: 141 MEQ/L (ref 135–145)
SODIUM BLD-SCNC: 143 MEQ/L (ref 135–145)
SOURCE, BLOOD GAS: ABNORMAL
SPECIFIC GRAVITY UA: 1.03 (ref 1–1.03)
TOTAL PROTEIN: 6 G/DL (ref 6.1–8)
UROBILINOGEN, URINE: 1 EU/DL (ref 0–1)
WBC # BLD: 9.7 THOU/MM3 (ref 4.8–10.8)
WBC UA: ABNORMAL /HPF
YEAST: ABNORMAL

## 2021-12-20 PROCEDURE — 6360000002 HC RX W HCPCS: Performed by: NURSE PRACTITIONER

## 2021-12-20 PROCEDURE — 94660 CPAP INITIATION&MGMT: CPT

## 2021-12-20 PROCEDURE — 2100000000 HC CCU R&B

## 2021-12-20 PROCEDURE — 87205 SMEAR GRAM STAIN: CPT

## 2021-12-20 PROCEDURE — 84145 PROCALCITONIN (PCT): CPT

## 2021-12-20 PROCEDURE — 87070 CULTURE OTHR SPECIMN AEROBIC: CPT

## 2021-12-20 PROCEDURE — 87798 DETECT AGENT NOS DNA AMP: CPT

## 2021-12-20 PROCEDURE — 99233 SBSQ HOSP IP/OBS HIGH 50: CPT | Performed by: NURSE PRACTITIONER

## 2021-12-20 PROCEDURE — 82728 ASSAY OF FERRITIN: CPT

## 2021-12-20 PROCEDURE — 6360000002 HC RX W HCPCS: Performed by: PHYSICIAN ASSISTANT

## 2021-12-20 PROCEDURE — 6370000000 HC RX 637 (ALT 250 FOR IP): Performed by: NURSE PRACTITIONER

## 2021-12-20 PROCEDURE — 82803 BLOOD GASES ANY COMBINATION: CPT

## 2021-12-20 PROCEDURE — 87486 CHLMYD PNEUM DNA AMP PROBE: CPT

## 2021-12-20 PROCEDURE — 87186 SC STD MICRODIL/AGAR DIL: CPT

## 2021-12-20 PROCEDURE — APPNB60 APP NON BILLABLE TIME 46-60 MINS: Performed by: NURSE PRACTITIONER

## 2021-12-20 PROCEDURE — 85027 COMPLETE CBC AUTOMATED: CPT

## 2021-12-20 PROCEDURE — 84132 ASSAY OF SERUM POTASSIUM: CPT

## 2021-12-20 PROCEDURE — 36415 COLL VENOUS BLD VENIPUNCTURE: CPT

## 2021-12-20 PROCEDURE — 71045 X-RAY EXAM CHEST 1 VIEW: CPT

## 2021-12-20 PROCEDURE — 80053 COMPREHEN METABOLIC PANEL: CPT

## 2021-12-20 PROCEDURE — 6360000002 HC RX W HCPCS

## 2021-12-20 PROCEDURE — 83615 LACTATE (LD) (LDH) ENZYME: CPT

## 2021-12-20 PROCEDURE — 81003 URINALYSIS AUTO W/O SCOPE: CPT

## 2021-12-20 PROCEDURE — 86140 C-REACTIVE PROTEIN: CPT

## 2021-12-20 PROCEDURE — 87581 M.PNEUMON DNA AMP PROBE: CPT

## 2021-12-20 PROCEDURE — 31500 INSERT EMERGENCY AIRWAY: CPT | Performed by: NURSE PRACTITIONER

## 2021-12-20 PROCEDURE — 81001 URINALYSIS AUTO W/SCOPE: CPT

## 2021-12-20 PROCEDURE — 93005 ELECTROCARDIOGRAM TRACING: CPT | Performed by: NURSE PRACTITIONER

## 2021-12-20 PROCEDURE — 36600 WITHDRAWAL OF ARTERIAL BLOOD: CPT

## 2021-12-20 PROCEDURE — 2580000003 HC RX 258: Performed by: PHYSICIAN ASSISTANT

## 2021-12-20 PROCEDURE — XW0DXM6 INTRODUCTION OF BARICITINIB INTO MOUTH AND PHARYNX, EXTERNAL APPROACH, NEW TECHNOLOGY GROUP 6: ICD-10-PCS | Performed by: HOSPITALIST

## 2021-12-20 PROCEDURE — 82948 REAGENT STRIP/BLOOD GLUCOSE: CPT

## 2021-12-20 PROCEDURE — 87631 RESP VIRUS 3-5 TARGETS: CPT

## 2021-12-20 PROCEDURE — 87086 URINE CULTURE/COLONY COUNT: CPT

## 2021-12-20 PROCEDURE — 2500000003 HC RX 250 WO HCPCS: Performed by: NURSE PRACTITIONER

## 2021-12-20 PROCEDURE — 94002 VENT MGMT INPAT INIT DAY: CPT

## 2021-12-20 PROCEDURE — 87077 CULTURE AEROBIC IDENTIFY: CPT

## 2021-12-20 PROCEDURE — 6370000000 HC RX 637 (ALT 250 FOR IP): Performed by: PHYSICIAN ASSISTANT

## 2021-12-20 PROCEDURE — 87541 LEGION PNEUMO DNA AMP PROB: CPT

## 2021-12-20 PROCEDURE — 2580000003 HC RX 258: Performed by: NURSE PRACTITIONER

## 2021-12-20 PROCEDURE — 89220 SPUTUM SPECIMEN COLLECTION: CPT

## 2021-12-20 RX ORDER — SODIUM CHLORIDE 0.9 % (FLUSH) 0.9 %
5-40 SYRINGE (ML) INJECTION EVERY 12 HOURS SCHEDULED
Status: DISCONTINUED | OUTPATIENT
Start: 2021-12-20 | End: 2021-12-20 | Stop reason: SDUPTHER

## 2021-12-20 RX ORDER — CHLORHEXIDINE GLUCONATE 0.12 MG/ML
15 RINSE ORAL 2 TIMES DAILY
Status: DISCONTINUED | OUTPATIENT
Start: 2021-12-20 | End: 2022-01-04

## 2021-12-20 RX ORDER — ZINC SULFATE 50(220)MG
50 CAPSULE ORAL DAILY
Status: DISCONTINUED | OUTPATIENT
Start: 2021-12-21 | End: 2021-12-21

## 2021-12-20 RX ORDER — MIDAZOLAM HYDROCHLORIDE 1 MG/ML
4 INJECTION INTRAMUSCULAR; INTRAVENOUS ONCE
Status: COMPLETED | OUTPATIENT
Start: 2021-12-20 | End: 2021-12-20

## 2021-12-20 RX ORDER — SENNOSIDES 8.8 MG/5ML
5 LIQUID ORAL 2 TIMES DAILY PRN
Status: DISCONTINUED | OUTPATIENT
Start: 2021-12-20 | End: 2022-01-14 | Stop reason: HOSPADM

## 2021-12-20 RX ORDER — PROPOFOL 10 MG/ML
INJECTION, EMULSION INTRAVENOUS
Status: COMPLETED
Start: 2021-12-20 | End: 2021-12-20

## 2021-12-20 RX ORDER — MIDAZOLAM IN NACL,ISO-OSMOT/PF 50 MG/50ML
INFUSION BOTTLE (ML) INTRAVENOUS
Status: DISPENSED
Start: 2021-12-20 | End: 2021-12-21

## 2021-12-20 RX ORDER — ACETAMINOPHEN 650 MG/1
650 SUPPOSITORY RECTAL EVERY 6 HOURS PRN
Status: DISCONTINUED | OUTPATIENT
Start: 2021-12-20 | End: 2021-12-31

## 2021-12-20 RX ORDER — ONDANSETRON 4 MG/1
4 TABLET, ORALLY DISINTEGRATING ORAL EVERY 8 HOURS PRN
Status: DISCONTINUED | OUTPATIENT
Start: 2021-12-20 | End: 2022-01-14 | Stop reason: HOSPADM

## 2021-12-20 RX ORDER — MIDAZOLAM IN NACL,ISO-OSMOT/PF 50 MG/50ML
1-10 INFUSION BOTTLE (ML) INTRAVENOUS CONTINUOUS
Status: DISCONTINUED | OUTPATIENT
Start: 2021-12-20 | End: 2021-12-30

## 2021-12-20 RX ORDER — FENTANYL CITRATE 50 UG/ML
25 INJECTION, SOLUTION INTRAMUSCULAR; INTRAVENOUS
Status: DISCONTINUED | OUTPATIENT
Start: 2021-12-20 | End: 2022-01-02

## 2021-12-20 RX ORDER — VITAMIN B COMPLEX
2000 TABLET ORAL DAILY
Status: DISCONTINUED | OUTPATIENT
Start: 2021-12-21 | End: 2021-12-21

## 2021-12-20 RX ORDER — SODIUM CHLORIDE 9 MG/ML
25 INJECTION, SOLUTION INTRAVENOUS PRN
Status: DISCONTINUED | OUTPATIENT
Start: 2021-12-20 | End: 2022-01-14 | Stop reason: HOSPADM

## 2021-12-20 RX ORDER — SODIUM CHLORIDE 0.9 % (FLUSH) 0.9 %
5-40 SYRINGE (ML) INJECTION PRN
Status: DISCONTINUED | OUTPATIENT
Start: 2021-12-20 | End: 2022-01-14 | Stop reason: HOSPADM

## 2021-12-20 RX ORDER — ASPIRIN 81 MG/1
81 TABLET, CHEWABLE ORAL DAILY
Status: DISCONTINUED | OUTPATIENT
Start: 2021-12-21 | End: 2022-01-14 | Stop reason: HOSPADM

## 2021-12-20 RX ORDER — ONDANSETRON 2 MG/ML
4 INJECTION INTRAMUSCULAR; INTRAVENOUS EVERY 6 HOURS PRN
Status: DISCONTINUED | OUTPATIENT
Start: 2021-12-20 | End: 2022-01-14 | Stop reason: HOSPADM

## 2021-12-20 RX ORDER — ASCORBIC ACID 500 MG
500 TABLET ORAL DAILY
Status: DISCONTINUED | OUTPATIENT
Start: 2021-12-21 | End: 2021-12-21

## 2021-12-20 RX ORDER — FENTANYL CITRATE 50 UG/ML
INJECTION, SOLUTION INTRAMUSCULAR; INTRAVENOUS
Status: COMPLETED
Start: 2021-12-20 | End: 2021-12-20

## 2021-12-20 RX ORDER — ACETAMINOPHEN 325 MG/1
650 TABLET ORAL EVERY 6 HOURS PRN
Status: DISCONTINUED | OUTPATIENT
Start: 2021-12-20 | End: 2021-12-31

## 2021-12-20 RX ORDER — PROPOFOL 10 MG/ML
5-50 INJECTION, EMULSION INTRAVENOUS
Status: DISCONTINUED | OUTPATIENT
Start: 2021-12-20 | End: 2021-12-21

## 2021-12-20 RX ADMIN — ONDANSETRON 4 MG: 2 INJECTION INTRAMUSCULAR; INTRAVENOUS at 05:50

## 2021-12-20 RX ADMIN — INSULIN LISPRO 5 UNITS: 100 INJECTION, SOLUTION INTRAVENOUS; SUBCUTANEOUS at 13:43

## 2021-12-20 RX ADMIN — ENOXAPARIN SODIUM 30 MG: 100 INJECTION SUBCUTANEOUS at 22:42

## 2021-12-20 RX ADMIN — SODIUM CHLORIDE, PRESERVATIVE FREE 10 ML: 5 INJECTION INTRAVENOUS at 10:02

## 2021-12-20 RX ADMIN — SODIUM CHLORIDE 25 ML: 9 INJECTION, SOLUTION INTRAVENOUS at 10:00

## 2021-12-20 RX ADMIN — OXYCODONE HYDROCHLORIDE AND ACETAMINOPHEN 500 MG: 500 TABLET ORAL at 10:03

## 2021-12-20 RX ADMIN — FAMOTIDINE 20 MG: 10 INJECTION, SOLUTION INTRAVENOUS at 21:26

## 2021-12-20 RX ADMIN — Medication 1 MG/HR: at 19:16

## 2021-12-20 RX ADMIN — PROPOFOL 1000 MG: 10 INJECTION, EMULSION INTRAVENOUS at 18:59

## 2021-12-20 RX ADMIN — Medication 50 MG: at 10:03

## 2021-12-20 RX ADMIN — MIDAZOLAM 4 MG: 1 INJECTION INTRAMUSCULAR; INTRAVENOUS at 19:49

## 2021-12-20 RX ADMIN — PROPOFOL 20 MCG/KG/MIN: 10 INJECTION, EMULSION INTRAVENOUS at 18:16

## 2021-12-20 RX ADMIN — DEXAMETHASONE SODIUM PHOSPHATE 20 MG: 4 INJECTION, SOLUTION INTRAMUSCULAR; INTRAVENOUS at 10:01

## 2021-12-20 RX ADMIN — INSULIN LISPRO 5 UNITS: 100 INJECTION, SOLUTION INTRAVENOUS; SUBCUTANEOUS at 09:44

## 2021-12-20 RX ADMIN — CHLORHEXIDINE GLUCONATE 0.12% ORAL RINSE 15 ML: 1.2 LIQUID ORAL at 21:26

## 2021-12-20 RX ADMIN — FENTANYL CITRATE 100 MCG: 50 INJECTION INTRAMUSCULAR; INTRAVENOUS at 19:00

## 2021-12-20 RX ADMIN — INSULIN GLARGINE 10 UNITS: 100 INJECTION, SOLUTION SUBCUTANEOUS at 09:41

## 2021-12-20 RX ADMIN — INSULIN LISPRO 5 UNITS: 100 INJECTION, SOLUTION INTRAVENOUS; SUBCUTANEOUS at 09:38

## 2021-12-20 RX ADMIN — Medication 2000 UNITS: at 10:03

## 2021-12-20 RX ADMIN — INSULIN LISPRO 3 UNITS: 100 INJECTION, SOLUTION INTRAVENOUS; SUBCUTANEOUS at 13:41

## 2021-12-20 RX ADMIN — BARICITINIB 4 MG: 2 TABLET, FILM COATED ORAL at 21:26

## 2021-12-20 RX ADMIN — ENOXAPARIN SODIUM 30 MG: 100 INJECTION SUBCUTANEOUS at 10:03

## 2021-12-20 ASSESSMENT — PAIN SCALES - GENERAL
PAINLEVEL_OUTOF10: 0
PAINLEVEL_OUTOF10: 6
PAINLEVEL_OUTOF10: 0
PAINLEVEL_OUTOF10: 0

## 2021-12-20 ASSESSMENT — PULMONARY FUNCTION TESTS
PIF_VALUE: 36.9
PIF_VALUE: 32

## 2021-12-20 NOTE — PROCEDURES
ICU PROCEDURE - ENDOTRACHEAL INTUBATION    Sisi eYboah     MRN#: [id number]  21      Acct [email or web address]     : 1950      INDICATION: acute respiratory failure, hypoxia failed Bipap    TIME OUT: taken    Permission obtained, risks/benefits reviewed:    ANESTHESIA:   []Ketamine  []Ativan  [] Morphine  [x]Propofol  [x]Other medications:Versed/Etomidate    ESTIMATED BLOOD LOSS:  None. COMPLICATIONS:  [x]N/A  [] Other:    LARYNGOSCOPIC AIRWAY GRADE (CORMACK-LEHANE):[]1  []2a  [x]2b []3  []4        INTUBATION EQUIPMENT USED:  [x] Direct video laryngoscope only    OUTCOME: Successful placement of #  7.5  Taperguard Evac endotracheal via   [x]Oral route    INSERTION DEPTH:  24    cm from   [x]lip           CONFIRMATION OF TUBE POSITION:   [x]Capnography - Strong & repeatable exhaled CO2 detection   [x]Multiple point auscultation   [x]SpO2 response   [x]STAT X-ray   [x]Bronchoscopic assessment    UNUSUAL FINDINGS:    PROCEDURE:     Using direct video laryngoscopy, the vocal cords were visualized and the endotracheal tube was placed through the cords under direct vision. Good breath sounds were auscultated bilaterally without sounds over abdomen. Appropriate strong & repeatable exhaled CO2 detection was confirmed.        Electronically signed by WYATT Nichols CNP on 2021 at 6:24 PM

## 2021-12-20 NOTE — CARE COORDINATION
12/20/21, 10:29 AM EST  DISCHARGE PLANNING EVALUATION:    Aries Arreola       Admitted: 12/18/2021/ Bharati 2 day: 2   Location: [address] [place] [address]-A Reason for admit: Hypoxia [R09.02]  Acute hypoxemic respiratory failure due to COVID-19 ([facility] [address] [U07.1, J96.01]  COVID-19 [U07.1]   PMH:  has no past medical history on file. Procedure:   12/18 CTA chest: 1. No evidence of pulmonary embolism. 2. Areas of abnormal density throughout both lung fields consistent with inflammatory process, presumably Covid 19 infection. 3. Mild cardiomegaly. 4. Mild dilatation of the ascending aorta. 5. Thoracic spondylosis. 6. Hiatal hernia. 7. Hepatosplenomegaly. 12/18 CXR: 1. Diffuse groundglass opacification of both lungs with areas of interstitial thickening and nodular consolidative opacities. Findings likely relate to multilobar pneumonia. Pulmonary edema could also give this appearance. Barriers to Discharge:  Presented to ED with sob. + covid. Requiring bipap. Hospitalist following. Vit C, D, zinc. Inhaler. Baricitinib. IV decadron. CRP 5.9. K+ 5.5. PCP: Yun Fierro  Readmission Risk Score: 5.2 ( )%    Patient Goals/Plan/Treatment Preferences: Concepcion Page is from home with her , she is requiring bipap 100% fio2. Will plan for meet and greet once condition improves. Transportation/Food Security/Housekeeping Addressed:  No issues identified.

## 2021-12-20 NOTE — PROGRESS NOTES
Hospitalist Progress Note    Patient:  Chau Philomena      Unit/Bed:8B-26/026-A    YOB: 1950    MRN: [id number]       Acct: [de-identified]     PCP: Kvng Farris    Date of Admission: 12/18/2021    Assessment/Plan:    1. Pneumonia secondary to COVID-19 infection--CRP noted to be 5.90; baricitinib 12/19; Decadron 20 mg IV x1 dose on 12/18; on vitamin C, vitamin D and zinc; has Acapella and incentive spirometry; instructed on self proning; calcitonin 0.06  2. Acute hypoxic respiratory failure--on BiPAP with 100% Fi02; wean as able, has Acapella and incentive spirometry; CTA of the chest done and no PE; this patient wishes to be a full code and I did discuss this case with Lu in ICU  3. Hyponatremia, mild--resolved  4. Hyperkalemia--recheck  5. Diabetes mellitus type 2, uncontrolled--Lantus along with low-dose sliding scale; also uses 5 units Premeal Humalog; monitor  6. Thrombocytopenia, mild--likely viral etiology; resolved  7. Hepatosplenomegaly  8. Obesity with BMI 37.73    Expected discharge date: Pending clinical course    Disposition:    [x] Home       [] TCU       [] Rehab       [] Psych       [] SNF       [] [place]       [] Other-    Chief Complaint: Shortness of breath    Hospital Course: Per H&P dated 12/18: dAia Pedroza is a 73 y.o. female with PMHx of CAD, essential hypertension, type II diabetes, who presents to [facility] with shortness of breath. Patient states she began having symptoms on 12/12 of loss of appetite, changes in taste and smell, food aversions, nausea with dry heaves and diarrhea. she originally attributed these symptoms to changes in her diabetic medications. As symptoms persisted, patient states she was advised by her PCP to go to ED at [facility] for workup and found to be COVID positive with hypoxia 70% spo2.    She decided to leave AMA per their recommendation she be admitted to their facility as she preferred to be admitted to [facility] for treatment and family close by. She states she has felt okay in spite of measured hypoxia. She denies fevers, chills, chest pain, dizziness, numbness or tingling, abdominal pain      ED course:   Vitals on arrival 98.5F, RR 28, HR 95, /77  Labs: Lactic acid, CBC, ABG, Ferritin, CRP   EKG, CXR, CTA chest\"    12/19--> now on high flow however settings not documented as patient desatted to 83% on 6 L; stop IV fluids    12/20-->on BiPAP now despite being maxed out on high flow and was tachypneic; palliative care saw patient and she does want to be a full code and is okay with intubation; will reevaluate     Subjective (past 24 hours): relates to diarrhea the past 2 days, denies pain; feels short of breath, she is alert and oriented and when we discussed intubation she does not think she wants it    Medications:  Reviewed    Infusion Medications    sodium chloride      dextrose       Scheduled Medications    dexamethasone  20 mg IntraVENous Daily    sodium chloride flush  5-40 mL IntraVENous 2 times per day    enoxaparin  30 mg SubCUTAneous Q12H    Vitamin D  2,000 Units Oral Daily    ascorbic acid  500 mg Oral Daily    zinc sulfate  50 mg Oral Daily    insulin glargine  10 Units SubCUTAneous Daily    insulin lispro  0-6 Units SubCUTAneous TID     insulin lispro  0-3 Units SubCUTAneous Nightly    insulin lispro  5 Units SubCUTAneous Once    Followed by   [address] [place] [address] insulin lispro  5 Units SubCUTAneous TID WC    baricitinib  4 mg Oral Nightly     PRN Meds: sodium chloride flush, sodium chloride, ondansetron **OR** ondansetron, polyethylene glycol, acetaminophen **OR** acetaminophen, guaiFENesin-dextromethorphan, glucose, dextrose, glucagon (rDNA), dextrose, albuterol sulfate HFA      Intake/Output Summary (Last 24 hours) at 12/20/2021 9826  Last data filed at 12/20/2021 0354  Gross per 24 hour   Intake 540 ml   Output 250 ml   Net 290 ml       Diet:  ADULT DIET;  Regular; 4 carb choices (60 gm/meal)    Exam:  /66   Pulse 77   Temp 97.6 °F (36.4 °C) (Axillary)   Resp 28   Ht 5' 2\" (1.575 m)   Wt 206 lb 4.8 oz (93.6 kg)   SpO2 90%   BMI 37.73 kg/m²     General appearance: No apparent distress, appears stated age and cooperative. HEENT: Pupils equal, round, and reactive to light. Conjunctivae/corneas clear. Neck: Supple, with full range of motion. No jugular venous distention. Trachea midline. Respiratory:  Normal respiratory effort. Rhonchi to auscultation, bilaterally without Rales/Wheezes/Rhonchi. Speaks in complete sentences  Cardiovascular: Regular rate and rhythm with normal S1/S2 without murmurs, rubs or gallops. Abdomen: Soft, non-tender, non-distended with normal bowel sounds. Obese  Musculoskeletal: passive and active ROM x 4 extremities. Skin: Skin color, texture, turgor normal.    Neurologic:  Neurovascularly intact without any focal sensory/motor deficits. Cranial nerves: II-XII intact, grossly non-focal.  Psychiatric: Alert and oriented, thought content appropriate  Capillary Refill: Brisk,< 3 seconds   Peripheral Pulses: +2 palpable, equal bilaterally     Labs:   Recent Labs     12/18/21  1215   WBC 5.5   HGB 14.7   HCT 42.5   *     Recent Labs     12/18/21  1215   *   K 4.4   CL 93*   CO2 20*   BUN 17   CREATININE 0.8   CALCIUM 8.9     Recent Labs     12/18/21  1215   AST 67*   ALT 31   BILITOT 0.3   ALKPHOS 73     Microbiology:    COVID-19 positive on 12/18 at [facility]    Radiology:  CTA CHEST W WO CONTRAST    Result Date: 12/18/2021  PROCEDURE: CTA CHEST W WO CONTRAST CLINICAL INFORMATION: shortness of breath. COMPARISON: No prior study. TECHNIQUE: 3 mm axial images were obtained through the chest after the administration of IV contrast.  A non-contrast localizer was obtained. 3D reconstructions were performed on the scanner to include MIP coronal and sagittal images through the chest. Isovue was the intravenous contrast utilized.

## 2021-12-20 NOTE — PROGRESS NOTES
Met with Remington Nation at bedside. Palliative Care introduced. Discussed medical condition and the need for BiPAP at 100% FiO2. Asked Remington Nation if her breathing got worse, would she want to be intubated. She said \"no\". I asked why and she expressed that she has heard that everyone dies that's put on a ventilator. I explained to Remington Nation that her chance of survival is better with intubation than without, but without intubation, she may die. I also discussed code status, specifically asking if she would want chest compressions, shock and resuscitative meds. She nodded her head \"yes\". I told her that she is a full code at this time and if she wished to have her code status changed, she would have to let us know. She voiced understanding. She asked me to call her daughter [employer] to discuss this as well. Call placed to Kimberly at [phone] and left a message to return my call. Primary nurse updated as well as PRABHJOT Avila CNP.

## 2021-12-20 NOTE — CONSULTS
CRITICAL CARE CONSULT NOTE      Patient:  Thalia Hill    Unit/Bed:-07/007-A  YOB: 1950  MRN: [id number]   PCP: Ambar Almeida  Date of Admission: 12/18/2021  Chief Complaint:- acute respiratory failure     Assessment and Plan:    1. Acute respiratory failure, hypoxia: 12/20/2021 patient failed BiPAP. Noted hypoxia and tachypnea on max BiPAP support. It was orally intubated and connected to PCV mode ventilation. Continue with lung protection strategies targeting peak pressure 35 or less and plateau of 30 and less. Respiratory culture is pending. 2. Severe Covid-19: 12/12/2021 + onset of symptoms of loss of appetite, changes in taste and smell, nausea and diarrhea. Patient tested positive for Covid-19 12/17/2021. Currently on Decadron and Olumiant. 3. Severe ARDS: Calculated PF ratio after intubation equals 117. Pronation therapy will be initiated. 4. Hyperkalemia: Likely secondary to insulin deficiency. No EKG changes associated. Will repeat and monitor. 5. Diabetes type 2, uncontrolled: Lantus was increased and increase in frequency of Accu-Chek monitoring and glucose coverage. With target blood sugar of 140-180. Hemoglobin A1c is pending  6. CAD: History of bare mental stent placement in 2008 with known 50% stenosis of RCA. Will start aspirin. Nursing to call family for current medication list.  7. Essential hypertension: History, monitor. 8. Thrombocytopenia: Resolved  9. Obesity: BMI 37.7      INITIAL H AND P AND ICU COURSE:  Karlos Resendiz is a 73year old  female transferred to the [address] [place] [address] ICU on Bipap for evaluation of intubation. Patient has a significant history of lifetime non-smoker, CAD-2008 s/p Bare metal stent ostial LAD in October 2008 recurrence of chest pain and found to have stent stenosis of the ostial LAD stent. Treated with Cutting Balloon and subsequent placement of another larger bare-metal stent inside the previous stent.   RCA-50% lesion, Essential HPTN, DMT2,Dyslipidemia. Remington Nation presented to [facility] on 2021 via ER. Patient was found to be hypoxic with SaO2 of 80% on room air, this did improve to 92% on 4 L per nasal cannula. Chief complaint of dyspnea. 2021 persistent hypoxia patient was transition to high flow nasal cannula. 2021 patient was positioned to BiPAP, she did require high pressure and FiO2 requirements. Patient was transferred to [address] [place] [address]-ICU. She was found to be hypoxic and tachypneic patient was intubated. Past Medical History: See HPI. Family History: Mother- in an accident, father  from lung cancer. Grandmother-CAD  Social History: Lifetime non-smoker, denies any alcohol or illicit drug use.     ROS   GENERAL: Positive for fatigue  SKN: Left upper forearm adapter to monitor glucose present Band-Aid in place no drainage  HEAD: No headaches or recent injury  EYES: No acute changes in vision, no diplopia or blurred vision  EARS: No hearing loss, no tinnitus  NOSE/THROAT: No rhinorrhea or pharyngitis, no nasal drainage  NECK: No lumps or unusual neck stiffness  PULMONARY: Positive for hypoxia and dyspnea  CARDIAC: No chest pain, pressure, heaviness or tightness   GI: No history melena or hematochezia, no diarrhea or constipation  PERIPHERAL VASCULAR: No intermittent claudication or unusual leg cramps  MUSCULOSKELETAL: Occasional arthralgias, myalgias  NEUROLOGICAL: No history of CVA, TIA, Seizures or Syncope  HEMATOLOGIC:  No unusual bruising or bleeding  PSYCH: No homicidal or suicidal ideations    Scheduled Meds:   propofol        fentaNYL        [START ON 2021] ascorbic acid  500 mg Per NG tube Daily    baricitinib  4 mg Per NG tube Nightly    insulin lispro  0-6 Units SubCUTAneous Q4H    [START ON 2021] Vitamin D  2,000 Units Per NG tube Daily    [START ON 2021] zinc sulfate  50 mg Per NG tube Daily    chlorhexidine  15 mL Mouth/Throat BID    famotidine (PEPCID) injection 20 mg IntraVENous BID    sodium chloride flush  5-40 mL IntraVENous 2 times per day    dexamethasone  20 mg IntraVENous Daily    enoxaparin  30 mg SubCUTAneous Q12H    insulin glargine  10 Units SubCUTAneous Daily     Continuous Infusions:   sodium chloride      dextrose         PHYSICAL EXAMINATION:  T: 98.2.  P: 62. RR: 34. B/P: 143/69. FiO2: 100. O2 Sat: 88.  I/O: Pending  Body mass index is 37.73 kg/m². GCS:   15  General:   Pale acutely ill  HEENT:  normocephalic and atraumatic. No scleral icterus. PERR  Neck: supple. No Thyromegaly. Lungs: clear to auscultation-diminished in bases. No retractions  Cardiac: RRR. No JVD. Abdomen: soft. Nontender. Extremities:  No clubbing, cyanosis, or edema x 4. Vasculature: capillary refill < 3 seconds. Palpable dorsalis pedis pulses. Skin:  warm and dry. Psych:  Alert and oriented x3. Affect appropriate  Lymph:  No supraclavicular adenopathy. Neurologic:  No focal deficit. No seizures. Data: (All radiographs, tracings, PFTs, and imaging are personally viewed and interpreted unless otherwise noted).  12/20/2021 1237 potassium 4.6   12/20/2021 1844 sodium 141 potassium 5.2 chlorides 106 CO2 23   BUN 21 creatinine 0.6   Glucose 281   Calcium is 8.1   Procalcitonin 0.06   CRP 7.91    WBC 9.7 hemoglobin 13.6 hematocrit 40.3 platelet count 422   12/18/2021 CTA chest-no evidence of pulmonary embolism. Areas of abnormal density throughout both lung fields with inflammatory process presumably COVID-19 infection. Mild cardiomegaly. Mild dilation of the ascending aorta. Thoracic spondylolysis. Hiatal hernia. Hepatosplenomegaly.  12/20/2021 chest x-ray-diffuse patchy lung opacities as evidence for diffuse pneumonia RRs following intubation.  12/20/2021 EKG normal sinus rhythm heart 60   with no acute ST or T wave abnormality. Seen with multidisciplinary ICU team yes.   Meets Continued ICU Level Care Criteria: [x] Yes   [] No - Transfer Planned to listed location:  [] HOSPITALIST CONTACTED-      Case and plan discussed with Dr. Asif Hoskins.         Electronically signed by WYATT Irving - CNP  CRITICAL CARE SPECIALIST

## 2021-12-20 NOTE — PROGRESS NOTES
During morning assessment noted patient's  Respiration rate of 32 stating at 90-91%. Educated patient on proper deep breathing exercises. When she would take slower deep breaths 02 SAT would increase to 93-94%. Encouraged patient to continue to work on slowing breathing and making each breath deeper. While in room patient would stop breathing instructions and stats would go back to 90-91%. Will continue to educate/encourage.

## 2021-12-20 NOTE — PROGRESS NOTES
Updated pt's daughter [employer] and  Vandana Hernandez on phone regarding code status and conversation with Karina Israel. They both voiced understanding and were appreciative of the update.

## 2021-12-20 NOTE — PROGRESS NOTES
Pt's spouse called requesting update, informed him that dayshift nurse phoned daughter already today, who is the primary contact in the computer. Update given and spouse requests to be the primary contact. Informed him I will speak with pt to confirm.

## 2021-12-20 NOTE — PLAN OF CARE
Went to reevaluate patient, respiratory rate of 30, on BiPAP 14/7 with 100% FiO2 satting 88 to 89%; chest x-ray shows worsening; I again spoke with Connie Tobias NP in the ICU and the decision was made to transfer her to [facility] ICU for close monitoring and possible intubation

## 2021-12-21 ENCOUNTER — APPOINTMENT (OUTPATIENT)
Dept: GENERAL RADIOLOGY | Age: 71
DRG: 207 | End: 2021-12-21
Payer: MEDICARE

## 2021-12-21 LAB
ACINETOBACTER CALCOACETICUS-BAUMANNII BY PCR: NOT DETECTED
ADENOVIRUS BY PCR: NOT DETECTED
ALBUMIN SERPL-MCNC: 3.3 G/DL (ref 3.5–5.1)
ALP BLD-CCNC: 67 U/L (ref 38–126)
ALT SERPL-CCNC: 17 U/L (ref 11–66)
ANION GAP SERPL CALCULATED.3IONS-SCNC: 10 MEQ/L (ref 8–16)
ANION GAP SERPL CALCULATED.3IONS-SCNC: 15 MEQ/L (ref 8–16)
AST SERPL-CCNC: 33 U/L (ref 5–40)
AVERAGE GLUCOSE: 237 MG/DL (ref 70–126)
BASOPHILS # BLD: 0.2 %
BASOPHILS ABSOLUTE: 0 THOU/MM3 (ref 0–0.1)
BILIRUB SERPL-MCNC: 0.3 MG/DL (ref 0.3–1.2)
BILIRUBIN DIRECT: < 0.2 MG/DL (ref 0–0.3)
BUN BLDV-MCNC: 27 MG/DL (ref 7–22)
BUN BLDV-MCNC: 34 MG/DL (ref 7–22)
CALCIUM IONIZED: 1.22 MMOL/L (ref 1.12–1.32)
CALCIUM SERPL-MCNC: 8.6 MG/DL (ref 8.5–10.5)
CALCIUM SERPL-MCNC: 8.7 MG/DL (ref 8.5–10.5)
CHLAMYDIA PNEUMONIAE BY PCR: NOT DETECTED
CHLORIDE BLD-SCNC: 103 MEQ/L (ref 98–111)
CHLORIDE BLD-SCNC: 104 MEQ/L (ref 98–111)
CO2: 22 MEQ/L (ref 23–33)
CO2: 25 MEQ/L (ref 23–33)
CREAT SERPL-MCNC: 0.7 MG/DL (ref 0.4–1.2)
CREAT SERPL-MCNC: 0.9 MG/DL (ref 0.4–1.2)
EKG ATRIAL RATE: 41 BPM
EKG ATRIAL RATE: 60 BPM
EKG P AXIS: -17 DEGREES
EKG P AXIS: 29 DEGREES
EKG P-R INTERVAL: 126 MS
EKG P-R INTERVAL: 132 MS
EKG Q-T INTERVAL: 434 MS
EKG Q-T INTERVAL: 514 MS
EKG QRS DURATION: 102 MS
EKG QRS DURATION: 84 MS
EKG QTC CALCULATION (BAZETT): 424 MS
EKG QTC CALCULATION (BAZETT): 434 MS
EKG R AXIS: 48 DEGREES
EKG R AXIS: 51 DEGREES
EKG T AXIS: 29 DEGREES
EKG T AXIS: 43 DEGREES
EKG VENTRICULAR RATE: 41 BPM
EKG VENTRICULAR RATE: 60 BPM
ENTEROBACTER CLOACAE COMPLEX BY PCR: NOT DETECTED
EOSINOPHIL # BLD: 0 %
EOSINOPHILS ABSOLUTE: 0 THOU/MM3 (ref 0–0.4)
ERYTHROCYTE [DISTWIDTH] IN BLOOD BY AUTOMATED COUNT: 12.7 % (ref 11.5–14.5)
ERYTHROCYTE [DISTWIDTH] IN BLOOD BY AUTOMATED COUNT: 42.2 FL (ref 35–45)
ESCHERICHIA COLI BY PCR: NOT DETECTED
FERRITIN: 2709 NG/ML (ref 10–291)
GFR SERPL CREATININE-BSD FRML MDRD: 62 ML/MIN/1.73M2
GFR SERPL CREATININE-BSD FRML MDRD: 83 ML/MIN/1.73M2
GLUCOSE BLD-MCNC: 100 MG/DL (ref 70–108)
GLUCOSE BLD-MCNC: 114 MG/DL (ref 70–108)
GLUCOSE BLD-MCNC: 118 MG/DL (ref 70–108)
GLUCOSE BLD-MCNC: 119 MG/DL (ref 70–108)
GLUCOSE BLD-MCNC: 151 MG/DL (ref 70–108)
GLUCOSE BLD-MCNC: 161 MG/DL (ref 70–108)
GLUCOSE BLD-MCNC: 186 MG/DL (ref 70–108)
GLUCOSE BLD-MCNC: 202 MG/DL (ref 70–108)
GLUCOSE BLD-MCNC: 206 MG/DL (ref 70–108)
GLUCOSE BLD-MCNC: 245 MG/DL (ref 70–108)
GLUCOSE BLD-MCNC: 298 MG/DL (ref 70–108)
GLUCOSE BLD-MCNC: 334 MG/DL (ref 70–108)
GLUCOSE BLD-MCNC: 336 MG/DL (ref 70–108)
GLUCOSE BLD-MCNC: 362 MG/DL (ref 70–108)
GLUCOSE BLD-MCNC: 367 MG/DL (ref 70–108)
GLUCOSE BLD-MCNC: 391 MG/DL (ref 70–108)
GLUCOSE BLD-MCNC: 400 MG/DL (ref 70–108)
GLUCOSE BLD-MCNC: 98 MG/DL (ref 70–108)
HAEMOPHILUS INFLUENZAE BY PCR: NOT DETECTED
HBA1C MFR BLD: 9.9 % (ref 4.4–6.4)
HCT VFR BLD CALC: 36.9 % (ref 37–47)
HEMOGLOBIN: 12 GM/DL (ref 12–16)
IMMATURE GRANS (ABS): 0.37 THOU/MM3 (ref 0–0.07)
IMMATURE GRANULOCYTES: 4.1 %
INFLUENZA A BY PCR: NOT DETECTED
INFLUENZA B BY PCR: NOT DETECTED
KLEBSIELLA AEROGENES BY PCR: NOT DETECTED
KLEBSIELLA OXYTOCA BY PCR: NOT DETECTED
KLEBSIELLA PNEUMONIAE GROUP BY PCR: NOT DETECTED
LEGIONELLA PNEUMOPHILIA BY PCR: NOT DETECTED
LV EF: 33 %
LVEF MODALITY: NORMAL
LYMPHOCYTES # BLD: 8.3 %
LYMPHOCYTES ABSOLUTE: 0.8 THOU/MM3 (ref 1–4.8)
MCH RBC QN AUTO: 29.4 PG (ref 26–33)
MCHC RBC AUTO-ENTMCNC: 32.5 GM/DL (ref 32.2–35.5)
MCV RBC AUTO: 90.4 FL (ref 81–99)
METAPNEUMOVIRUS BY PCR: NOT DETECTED
MONOCYTES # BLD: 5.2 %
MONOCYTES ABSOLUTE: 0.5 THOU/MM3 (ref 0.4–1.3)
MORAXELLA CATARRHALIS BY PCR: NOT DETECTED
MYCOPLASMA PNEUMONIAE BY PCR: NOT DETECTED
NON-SARS CORONAVIRUS: NOT DETECTED
NUCLEATED RED BLOOD CELLS: 0 /100 WBC
PARAINFLUENZA VIRUS BY PCR: NOT DETECTED
PLATELET # BLD: 145 THOU/MM3 (ref 130–400)
PMV BLD AUTO: 10.8 FL (ref 9.4–12.4)
POTASSIUM SERPL-SCNC: 4.7 MEQ/L (ref 3.5–5.2)
POTASSIUM SERPL-SCNC: 5.3 MEQ/L (ref 3.5–5.2)
PROTEUS SPECIES BY PCR: NOT DETECTED
PSEUDOMONAS AERUGINOSA BY PCR: NOT DETECTED
RBC # BLD: 4.08 MILL/MM3 (ref 4.2–5.4)
RESISTANT GENE CTX-M BY PCR: NORMAL
RESISTANT GENE IMP BY PCR: NORMAL
RESISTANT GENE KPC BY PCR: NORMAL
RESISTANT GENE MECA/C & MREJ BY PCR: NORMAL
RESISTANT GENE NDM BY PCR: NORMAL
RESISTANT GENE OXA-48-LIKE BY PCR: NORMAL
RESISTANT GENE VIM BY PCR: NORMAL
RESPIRATORY SYNCYTIAL VIRUS BY PCR: NOT DETECTED
RHINOVIRUS ENTEROVIRUS PCR: NOT DETECTED
SEG NEUTROPHILS: 82.2 %
SEGMENTED NEUTROPHILS ABSOLUTE COUNT: 7.5 THOU/MM3 (ref 1.8–7.7)
SERRATIA MARCESCENS BY PCR: NOT DETECTED
SODIUM BLD-SCNC: 138 MEQ/L (ref 135–145)
SODIUM BLD-SCNC: 141 MEQ/L (ref 135–145)
SOURCE: NORMAL
SPECIMEN ACCEPTABILITY: NORMAL
STAPH AUREUS BY PCR: NOT DETECTED
STREP AGALACTIAE BY PCR: NOT DETECTED
STREP PNEUMONIAE BY PCR: NOT DETECTED
STREP PYOGENES BY PCR: NOT DETECTED
TOTAL PROTEIN: 5.8 G/DL (ref 6.1–8)
WBC # BLD: 9.1 THOU/MM3 (ref 4.8–10.8)

## 2021-12-21 PROCEDURE — 02HV33Z INSERTION OF INFUSION DEVICE INTO SUPERIOR VENA CAVA, PERCUTANEOUS APPROACH: ICD-10-PCS | Performed by: INTERNAL MEDICINE

## 2021-12-21 PROCEDURE — 71045 X-RAY EXAM CHEST 1 VIEW: CPT

## 2021-12-21 PROCEDURE — 2580000003 HC RX 258: Performed by: NURSE PRACTITIONER

## 2021-12-21 PROCEDURE — 83036 HEMOGLOBIN GLYCOSYLATED A1C: CPT

## 2021-12-21 PROCEDURE — 6370000000 HC RX 637 (ALT 250 FOR IP): Performed by: NURSE PRACTITIONER

## 2021-12-21 PROCEDURE — 82330 ASSAY OF CALCIUM: CPT

## 2021-12-21 PROCEDURE — C1751 CATH, INF, PER/CENT/MIDLINE: HCPCS

## 2021-12-21 PROCEDURE — 2500000003 HC RX 250 WO HCPCS: Performed by: NURSE PRACTITIONER

## 2021-12-21 PROCEDURE — 6360000002 HC RX W HCPCS: Performed by: NURSE PRACTITIONER

## 2021-12-21 PROCEDURE — 82948 REAGENT STRIP/BLOOD GLUCOSE: CPT

## 2021-12-21 PROCEDURE — 82248 BILIRUBIN DIRECT: CPT

## 2021-12-21 PROCEDURE — 36569 INSJ PICC 5 YR+ W/O IMAGING: CPT

## 2021-12-21 PROCEDURE — 94003 VENT MGMT INPAT SUBQ DAY: CPT

## 2021-12-21 PROCEDURE — 5A1955Z RESPIRATORY VENTILATION, GREATER THAN 96 CONSECUTIVE HOURS: ICD-10-PCS | Performed by: INTERNAL MEDICINE

## 2021-12-21 PROCEDURE — 6360000002 HC RX W HCPCS: Performed by: PHYSICIAN ASSISTANT

## 2021-12-21 PROCEDURE — 76937 US GUIDE VASCULAR ACCESS: CPT

## 2021-12-21 PROCEDURE — 93306 TTE W/DOPPLER COMPLETE: CPT

## 2021-12-21 PROCEDURE — 85025 COMPLETE CBC W/AUTO DIFF WBC: CPT

## 2021-12-21 PROCEDURE — 0BH18EZ INSERTION OF ENDOTRACHEAL AIRWAY INTO TRACHEA, VIA NATURAL OR ARTIFICIAL OPENING ENDOSCOPIC: ICD-10-PCS | Performed by: INTERNAL MEDICINE

## 2021-12-21 PROCEDURE — 80053 COMPREHEN METABOLIC PANEL: CPT

## 2021-12-21 PROCEDURE — 99291 CRITICAL CARE FIRST HOUR: CPT | Performed by: INTERNAL MEDICINE

## 2021-12-21 PROCEDURE — 82728 ASSAY OF FERRITIN: CPT

## 2021-12-21 PROCEDURE — 2100000000 HC CCU R&B

## 2021-12-21 PROCEDURE — 93005 ELECTROCARDIOGRAM TRACING: CPT | Performed by: NURSE PRACTITIONER

## 2021-12-21 RX ORDER — FAMOTIDINE 20 MG/1
20 TABLET, FILM COATED ORAL DAILY
Status: ON HOLD | COMMUNITY
End: 2022-01-14

## 2021-12-21 RX ORDER — NITROGLYCERIN 0.4 MG/1
0.4 TABLET SUBLINGUAL EVERY 5 MIN PRN
COMMUNITY

## 2021-12-21 RX ORDER — GLIPIZIDE 10 MG/1
10 TABLET, FILM COATED, EXTENDED RELEASE ORAL DAILY
Status: ON HOLD | COMMUNITY
End: 2022-01-14

## 2021-12-21 RX ORDER — AZITHROMYCIN 250 MG/1
250 TABLET, FILM COATED ORAL DAILY
Status: ON HOLD | COMMUNITY
End: 2022-01-05 | Stop reason: HOSPADM

## 2021-12-21 RX ORDER — MULTIVIT WITH MINERALS/LUTEIN
1000 TABLET ORAL DAILY
Status: ON HOLD | COMMUNITY
End: 2022-01-14

## 2021-12-21 RX ORDER — ROSUVASTATIN CALCIUM 10 MG/1
10 TABLET, COATED ORAL DAILY
COMMUNITY

## 2021-12-21 RX ORDER — ASPIRIN 81 MG/1
81 TABLET, CHEWABLE ORAL DAILY
COMMUNITY

## 2021-12-21 RX ORDER — HYDROXYZINE PAMOATE 25 MG/1
25 CAPSULE ORAL 4 TIMES DAILY PRN
Status: ON HOLD | COMMUNITY
End: 2022-01-12 | Stop reason: SDUPTHER

## 2021-12-21 RX ORDER — DEXTROSE MONOHYDRATE 25 G/50ML
12.5 INJECTION, SOLUTION INTRAVENOUS PRN
Status: DISCONTINUED | OUTPATIENT
Start: 2021-12-21 | End: 2022-01-14 | Stop reason: HOSPADM

## 2021-12-21 RX ORDER — DEXAMETHASONE 6 MG/1
6 TABLET ORAL
Status: ON HOLD | COMMUNITY
End: 2022-01-05 | Stop reason: HOSPADM

## 2021-12-21 RX ORDER — DOPAMINE HYDROCHLORIDE 160 MG/100ML
2-20 INJECTION, SOLUTION INTRAVENOUS CONTINUOUS
Status: DISCONTINUED | OUTPATIENT
Start: 2021-12-21 | End: 2021-12-27

## 2021-12-21 RX ADMIN — ASPIRIN 81 MG CHEWABLE TABLET 81 MG: 81 TABLET CHEWABLE at 10:51

## 2021-12-21 RX ADMIN — Medication 10 MG/HR: at 04:42

## 2021-12-21 RX ADMIN — INSULIN GLARGINE 10 UNITS: 100 INJECTION, SOLUTION SUBCUTANEOUS at 09:23

## 2021-12-21 RX ADMIN — Medication 10 MG/HR: at 15:13

## 2021-12-21 RX ADMIN — Medication 25 MCG/HR: at 19:28

## 2021-12-21 RX ADMIN — Medication 25 MCG/HR: at 15:15

## 2021-12-21 RX ADMIN — CHLORHEXIDINE GLUCONATE 0.12% ORAL RINSE 15 ML: 1.2 LIQUID ORAL at 19:34

## 2021-12-21 RX ADMIN — FAMOTIDINE 20 MG: 10 INJECTION, SOLUTION INTRAVENOUS at 10:51

## 2021-12-21 RX ADMIN — CHLORHEXIDINE GLUCONATE 0.12% ORAL RINSE 15 ML: 1.2 LIQUID ORAL at 10:51

## 2021-12-21 RX ADMIN — ENOXAPARIN SODIUM 30 MG: 100 INJECTION SUBCUTANEOUS at 21:21

## 2021-12-21 RX ADMIN — ENOXAPARIN SODIUM 30 MG: 100 INJECTION SUBCUTANEOUS at 10:51

## 2021-12-21 RX ADMIN — PROPOFOL 30 MCG/KG/MIN: 10 INJECTION, EMULSION INTRAVENOUS at 05:59

## 2021-12-21 RX ADMIN — SODIUM CHLORIDE 6 UNITS/HR: 9 INJECTION, SOLUTION INTRAVENOUS at 10:56

## 2021-12-21 RX ADMIN — OXYCODONE HYDROCHLORIDE AND ACETAMINOPHEN 500 MG: 500 TABLET ORAL at 10:52

## 2021-12-21 RX ADMIN — PROPOFOL 30 MCG/KG/MIN: 10 INJECTION, EMULSION INTRAVENOUS at 00:15

## 2021-12-21 RX ADMIN — BARICITINIB 4 MG: 2 TABLET, FILM COATED ORAL at 19:34

## 2021-12-21 RX ADMIN — Medication 50 MG: at 10:52

## 2021-12-21 RX ADMIN — FAMOTIDINE 20 MG: 10 INJECTION, SOLUTION INTRAVENOUS at 19:34

## 2021-12-21 RX ADMIN — Medication 2000 UNITS: at 10:51

## 2021-12-21 RX ADMIN — DOPAMINE HYDROCHLORIDE IN DEXTROSE 2.5 MCG/KG/MIN: 1.6 INJECTION, SOLUTION INTRAVENOUS at 21:21

## 2021-12-21 RX ADMIN — DEXAMETHASONE SODIUM PHOSPHATE 20 MG: 4 INJECTION, SOLUTION INTRAMUSCULAR; INTRAVENOUS at 09:13

## 2021-12-21 ASSESSMENT — PULMONARY FUNCTION TESTS
PIF_VALUE: 30
PIF_VALUE: 28
PIF_VALUE: 32
PIF_VALUE: 28
PIF_VALUE: 32
PIF_VALUE: 30

## 2021-12-21 NOTE — PROGRESS NOTES
[facility]  PHYSICAL THERAPY MISSED TREATMENT NOTE  [facility] CVICU 4B    Date: 2021  Patient Name: Cr Maier        MRN: [id number]   : 1950  (73 y.o.)  Gender: female                REASON FOR MISSED TREATMENT:  Missed Treat. Pt intubated and proning. Pt on inc vent settings and not appropriate for PT at this time. Will discontinue PT and await new orders when appropriate.

## 2021-12-21 NOTE — PROGRESS NOTES
----- Message from Bassam Khan CRITICAL CARE PROGRESS NOTE      Patient:  Cr Maier    Unit/Bed:4B-07/007-A  YOB: 1950  MRN: [id number]   PCP: Tyler Kolb  Date of Admission: 12/18/2021  Chief Complaint:-Acute hypoxic respiratory failure    Assessment and Plan:      Acute hypoxic respiratory failure: Patient initially presented on nasal cannula escalated to high flow and BiPAP. She was intubated on 12/20/2021. Maintain lung protective strategies with peak pressure less than 35. Monitor for ability to wean. COVID-19: Onset of symptoms 12/12/2021. Positive on 12/17, continue Decadron and baricitinib. Severe ARDS: Calculated PF ratio is 117, initiate pronation therapy. Mild hyperkalemia: Mild, monitor patient on insulin drip for uncontrolled diabetes. Diabetes mellitus type 2 uncontrolled: On insulin drip. A1c noted at 9.9  Hypertension: Listed in history, currently normotensive. Monitor   Bradycardia: Likely secondary to sedation. Obesity: BMI 37.73  CAD: history of stent placement     INITIAL H AND P AND ICU COURSE:  Daria Lynn is a 57-year-old white female who presented to [facility] on 12/18/2021 with complaints of shortness of breath she has a past medical history lifetime non-smoker, CAD with stent placement in 2008, hypertension, diabetes mellitus, dyslipidemia. Per report she presented to [facility] on 12/18 with complaints of hypoxia and shortness of breath. She was initially placed on 4 L nasal cannula. 12/19 patient had persistent hypoxia and was transitioned to high flow nasal cannula. On 12/20 patient was transitioned to BiPAP and transferred to [facility] ICU for intubation. Past Medical History: Per HPI  Family History: No known family history  Social History: Lifetime non-smoker, denies alcohol or drug use.     ROS   Sedated on mechanical ventilation    Scheduled Meds:   baricitinib  4 mg Per NG tube Nightly    insulin lispro  0-6 Units SubCUTAneous Q4H chlorhexidine  15 mL Mouth/Throat BID    famotidine (PEPCID) injection  20 mg IntraVENous BID    aspirin  81 mg Per NG tube Daily    dexamethasone  20 mg IntraVENous Daily    enoxaparin  30 mg SubCUTAneous Q12H    insulin glargine  10 Units SubCUTAneous Daily     Continuous Infusions:   insulin 9.5 Units/hr (12/21/21 1319)    fentaNYL 500 mcg in dextrose 5% 100 ml infusion 25 mcg/hr (12/21/21 1515)    sodium chloride      midazolam 10 mg/hr (12/21/21 1513)    dextrose         PHYSICAL EXAMINATION:  T:  97.5.  P:  47. RR:  21. B/P:  114/70. FiO2:  70. O2 Sat:  95.  I/O:  434/700  Body mass index is 37.73 kg/m². PC: 18/10: TV: 427: RRTotal: 21: Ti:1 sec  General:   Acute on chronically ill-appearing white female  HEENT:  normocephalic and atraumatic. No scleral icterus. PERR  Neck: supple. No Thyromegaly. Lungs: Diminished to auscultation. No retractions  Cardiac: Sinus bradycardia. No JVD. Abdomen: soft. Nontender. Extremities:  No clubbing, cyanosis, or edema x 4. Vasculature: capillary refill < 3 seconds. Palpable dorsalis pedis pulses. Skin:  warm and dry. Psych: Sedated on mechanical ventilation  Lymph:  No supraclavicular adenopathy. Neurologic:  No focal deficit. No seizures. Data: (All radiographs, tracings, PFTs, and imaging are personally viewed and interpreted unless otherwise noted). Sodium 141, potassium 5.3, chloride 104, CO2 22, BUN 27, creatinine 0.7, anion gap 15.0, glucose 391  WBC 9.1, hemoglobin 12.0, hematocrit 36.9, platelet count 931  Telemetry shows sinus bradycardia  Pneumonia panel 12/20/2021 negative  Chest x-ray 12/20/2021 reports patchy opacities bilaterally  EKG 12/20/2021 reports normal sinus rhythm        Meets Continued ICU Level Care Criteria:    [x] Yes   [] No - Transfer Planned to listed location:  [] HOSPITALIST CONTACTED- DR     Case and plan discussed with Dr. Brad Bobo. Electronically signed by Karina Mendez.  WYATT Hall - CNP  CRITICAL CARE SPECIALIST  Patient seen by me. Case discussed with nurse practitioner. Continue mechanical ventilator support. Wean pressure control as tolerated. FiO2 70%. On insulin drip for DKA. Italicized font represents changes to the note made by me. CC time 35 minutes. Time was discontiguous. Time does not include procedures. Time does include my direct assessment of the patient and coordination of care.   Electronically signed by Polo Ellison MD on 12/21/2021 at 4:08 PM

## 2021-12-21 NOTE — SIGNIFICANT EVENT
Discussed  case with patient's  Nayan Rodrigues. All questions were answered. Electronically signed by Tesfaye Song.  Brad Garcia CNP on 12/21/2021 at 4:03 PM

## 2021-12-21 NOTE — PROGRESS NOTES
PICC Procedure Note    Mechelle Nye   Admitted- 12/18/2021 11:57 AM  Admission diagnosis- Hypoxia [R09.02]  Acute hypoxemic respiratory failure due to COVID-19 ([address] [U07.1, J96.01]  COVID-19 [U07.1]      Attending Physician- Florence Randolph MD  Ordering Physician-LIZZETTE Dunaway CNP  Indication for Insertion: Poor Vascular Access    Catheter Insertion Date- 12/21/2021   Lot Number- [id number]  Gauge-6  Lumen-triple    Insertion Site- JANETH Basilic  Vein Diameter- 4 mm  Catheter Length- 42 cm  Internal Length- 41 cm  Exposed Catheter Length- 1cm   Upper Arm Circumference- 31cm  Tip Confirmation System Bundle met- Yes  If X-ray required, Tip Location- N/A  Radiologist- N/A    PICC insertion successful- Yes  Ultrasound- yes    Okay To Use PICC- Yes    Electronically signed by Ruba Ortiz, RN, RN on 12/21/2021 at 4:02 PM

## 2021-12-21 NOTE — CARE COORDINATION
12/21/21, 11:54 AM EST    DISCHARGE  [facility] day: 3  Location: -07/007-A Reason for admit: Hypoxia [R09.02]  Acute hypoxemic respiratory failure due to COVID-19 [facility]) [U07.1, J96.01]  COVID-19 [U07.1]   Procedure:   12/20 Intubated  12/20 CXR  Impression:        Diffuse patchy lung opacities as evidence for diffuse pneumonia or ARDS following intubation. Barriers to Discharge: Intubated on ventilator. 70% FiO2, 50 liters oxygen, PEEP 8 with saturations at 93%. Tmax 99.2. K+ 5.3. Insulin drip with diabetes management, Versed, Propofol drips. IV Decadron, Lovenox. TF via OG tube, Dietician following. Await PICC line placement. PCP: Wellington Valladares  Readmission Risk Score: 8.6 ( )%  Patient Goals/Plan/Treatment Preferences:From home with spouse. Will follow for potential needs.

## 2021-12-21 NOTE — PROGRESS NOTES
Comprehensive Nutrition Assessment    Type and Reason for Visit:  Initial,Consult (tube feeding ordering and management, OK to start tube feeding per Lupillo Ricardoo ARIELLE)    Nutrition Recommendations/Plan:   Begin Nepro carb steady at 10ml/ hour, increase by 10ml every 12 hours as tolerated to goal 30ml/ hour  Flush one (2.5 ounce) Proteinex daily  Additional free water flush as per Doctor    Nutrition Assessment:    Pt. nutritionally compromised AEB NPO status due to intubation. At risk for further nutrition compromise r/t +COVID 12/19/21 (12/12 onset of symptoms: loss of appetite, taste/ smell changes, nausea, diarrhea), acute respiratory failure, intubation 12/20/21, severe ARDS, pronation therapy, uncontrolled DM 2 with A1C (12/21) 9.9% and underlying medical condition (hx CAD, DM, obesity). Nutrition recommendations/interventions as per above. Malnutrition Assessment:  Malnutrition Status:  Insufficient data    Context:  Acute Illness     Findings of the 6 clinical characteristics of malnutrition:  Energy Intake:   (loss of appetite x 1 week PTA reported on admission)  Weight Loss:  Unable to assess (no weight records per EMR)     Body Fat Loss:  Unable to assess     Muscle Mass Loss:  Unable to assess    Fluid Accumulation:  Unable to assess     Strength:  Not Performed    Estimated Daily Nutrient Needs:  Energy (kcal):  7196-2107 kcals (30-32);  Weight Used for Energy Requirements:  Ideal (50kgm)     Protein (g):  100 grams or greater (2); Weight Used for Protein Requirements:  Ideal (50kgm)        Fluid (ml/day):  as per Doctor; Method Used for Fluid Requirements:  Other (Comment)      Nutrition Related Findings:    Patient intubated 12/20, pronation therapy 12hr/ 12 hr, RN reports unable to assess abdomen due to currently proned, BM x 1 on 12/19; medication includes vitamin C, decadron, lantus, humalog, vitamin D, zinc, versed, diprivan, prn glycolax, to start insulin drip; Potassium 5.3, BUN 27, Creatinine 0.7, Glucose 391, A1C 9.9%;   OK per Ela Perdue CNP to begin tube feeding      Wounds:  None       Current Nutrition Therapies:    Diet NPO  ADULT TUBE FEEDING; Orogastric; Renal Formula; Continuous; 10; Yes; 10; Q 12 hours; 30; 30; Q 4 hours; Protein; one (2.5 ounce) Proteinex daily  Current Tube Feeding (TF) Orders:  · Feeding Route: Orogastric  · Formula: Renal Formula (Nepro)  · Schedule: Continuous (begin at 10ml/ hour, goal 30ml/ hour)  · Additives/Modulars: Protein (one Proteinex daily)  · Water Flushes: as per Doctor  · Goal TF & Flush Orders Provides:  1600 kcals (0518 TF, 104 modular, 222 diprivan), 84 grams protein (58 TF, 26 modular), 115 grams CHO, 18 grams fiber, 523ml water in 795ml (720 TF, 75 modular)/ 24 hours     Additional Calorie Sources:   Diprivan at 8.4ml/ hour provides 222 lipid kcals/ 24 hours    Anthropometric Measures:  · Height: 5' 2\" (157.5 cm)  · Current Body Weight: 206 lb 4.8 oz (93.6 kg)   · Admission Body Weight: 206 lb 4.8 oz (93.6 kg) (12/18; no edema)    · Usual Body Weight:  (no weight records per EMR)     · Ideal Body Weight: 110 lbs;   · BMI: 37.7  · BMI Categories: Obese Class 2 (BMI 35.0 -39.9)       Nutrition Diagnosis:   · Inadequate oral intake related to impaired respiratory function as evidenced by NPO or clear liquid status due to medical condition,intubation,nutrition support - enteral nutrition      Nutrition Interventions:   Food and/or Nutrient Delivery:  Start Tube Feeding,Continue NPO  Nutrition Education/Counseling:  No recommendation at this time   Coordination of Nutrition Care:  Continue to monitor while inpatient    Goals:  EN to provide % of nutrient needs for COVID recovery while intubated       Nutrition Monitoring and Evaluation:   Behavioral-Environmental Outcomes:  None Identified   Food/Nutrient Intake Outcomes:  Enteral Nutrition Intake/Tolerance  Physical Signs/Symptoms Outcomes:  Biochemical Data,Fluid Status or Edema,Hemodynamic Status,Nutrition Focused Physical Findings,Skin,Weight,GI Status     Discharge Planning:     Too soon to determine     Electronically signed by Estela Qiu RD, LD on 12/21/21 at 11:46 AM EST    Contact: [phone]

## 2021-12-21 NOTE — PROGRESS NOTES
[facility]  OCCUPATIONAL THERAPY MISSED TREATMENT NOTE  [facility] CVICU 4B  4B-07/007-A      Date: 2021  Patient Name: Tone Vera        CSN: [id number]   : 1950  (73 y.o.)  Gender: female                REASON FOR MISSED TREATMENT: per chart review; patient was maxed out on BiPap and was intubated on 21 and is not appropriate for early mobility at this time. OT to discontinue ordres. Please re-order when patient stable and appropriate for activity.

## 2021-12-22 LAB
EKG ATRIAL RATE: 54 BPM
EKG P AXIS: 7 DEGREES
EKG P-R INTERVAL: 138 MS
EKG Q-T INTERVAL: 476 MS
EKG QRS DURATION: 100 MS
EKG QTC CALCULATION (BAZETT): 451 MS
EKG R AXIS: 52 DEGREES
EKG T AXIS: 28 DEGREES
EKG VENTRICULAR RATE: 54 BPM
GLUCOSE BLD-MCNC: 114 MG/DL (ref 70–108)
GLUCOSE BLD-MCNC: 116 MG/DL (ref 70–108)
GLUCOSE BLD-MCNC: 119 MG/DL (ref 70–108)
GLUCOSE BLD-MCNC: 121 MG/DL (ref 70–108)
GLUCOSE BLD-MCNC: 121 MG/DL (ref 70–108)
GLUCOSE BLD-MCNC: 123 MG/DL (ref 70–108)
GLUCOSE BLD-MCNC: 123 MG/DL (ref 70–108)
GLUCOSE BLD-MCNC: 128 MG/DL (ref 70–108)
GLUCOSE BLD-MCNC: 129 MG/DL (ref 70–108)
GLUCOSE BLD-MCNC: 129 MG/DL (ref 70–108)
GLUCOSE BLD-MCNC: 133 MG/DL (ref 70–108)
GLUCOSE BLD-MCNC: 137 MG/DL (ref 70–108)
GLUCOSE BLD-MCNC: 143 MG/DL (ref 70–108)
GLUCOSE BLD-MCNC: 167 MG/DL (ref 70–108)
GLUCOSE BLD-MCNC: 177 MG/DL (ref 70–108)
URINE CULTURE, ROUTINE: NORMAL

## 2021-12-22 PROCEDURE — 6360000002 HC RX W HCPCS: Performed by: NURSE PRACTITIONER

## 2021-12-22 PROCEDURE — 2500000003 HC RX 250 WO HCPCS: Performed by: NURSE PRACTITIONER

## 2021-12-22 PROCEDURE — 6370000000 HC RX 637 (ALT 250 FOR IP): Performed by: NURSE PRACTITIONER

## 2021-12-22 PROCEDURE — 6370000000 HC RX 637 (ALT 250 FOR IP): Performed by: STUDENT IN AN ORGANIZED HEALTH CARE EDUCATION/TRAINING PROGRAM

## 2021-12-22 PROCEDURE — 2100000000 HC CCU R&B

## 2021-12-22 PROCEDURE — 94003 VENT MGMT INPAT SUBQ DAY: CPT

## 2021-12-22 PROCEDURE — 6360000002 HC RX W HCPCS: Performed by: PHYSICIAN ASSISTANT

## 2021-12-22 PROCEDURE — 2580000003 HC RX 258: Performed by: NURSE PRACTITIONER

## 2021-12-22 PROCEDURE — 99291 CRITICAL CARE FIRST HOUR: CPT | Performed by: INTERNAL MEDICINE

## 2021-12-22 PROCEDURE — 82948 REAGENT STRIP/BLOOD GLUCOSE: CPT

## 2021-12-22 RX ORDER — INSULIN GLARGINE 100 [IU]/ML
14 INJECTION, SOLUTION SUBCUTANEOUS DAILY
Status: DISCONTINUED | OUTPATIENT
Start: 2021-12-23 | End: 2021-12-22

## 2021-12-22 RX ORDER — INSULIN GLARGINE 100 [IU]/ML
14 INJECTION, SOLUTION SUBCUTANEOUS DAILY
Status: DISCONTINUED | OUTPATIENT
Start: 2021-12-22 | End: 2021-12-23

## 2021-12-22 RX ADMIN — INSULIN GLARGINE 10 UNITS: 100 INJECTION, SOLUTION SUBCUTANEOUS at 09:29

## 2021-12-22 RX ADMIN — FAMOTIDINE 20 MG: 10 INJECTION, SOLUTION INTRAVENOUS at 20:07

## 2021-12-22 RX ADMIN — DOPAMINE HYDROCHLORIDE IN DEXTROSE 6.5 MCG/KG/MIN: 1.6 INJECTION, SOLUTION INTRAVENOUS at 17:19

## 2021-12-22 RX ADMIN — DEXAMETHASONE SODIUM PHOSPHATE 20 MG: 4 INJECTION, SOLUTION INTRAMUSCULAR; INTRAVENOUS at 11:20

## 2021-12-22 RX ADMIN — INSULIN GLARGINE 14 UNITS: 100 INJECTION, SOLUTION SUBCUTANEOUS at 21:40

## 2021-12-22 RX ADMIN — BARICITINIB 4 MG: 2 TABLET, FILM COATED ORAL at 20:07

## 2021-12-22 RX ADMIN — ASPIRIN 81 MG CHEWABLE TABLET 81 MG: 81 TABLET CHEWABLE at 09:28

## 2021-12-22 RX ADMIN — SODIUM CHLORIDE 3.8 UNITS/HR: 9 INJECTION, SOLUTION INTRAVENOUS at 00:08

## 2021-12-22 RX ADMIN — Medication 10 MG/HR: at 10:15

## 2021-12-22 RX ADMIN — Medication 9 MG/HR: at 20:13

## 2021-12-22 RX ADMIN — ENOXAPARIN SODIUM 30 MG: 100 INJECTION SUBCUTANEOUS at 21:40

## 2021-12-22 RX ADMIN — CHLORHEXIDINE GLUCONATE 0.12% ORAL RINSE 15 ML: 1.2 LIQUID ORAL at 09:28

## 2021-12-22 RX ADMIN — ENOXAPARIN SODIUM 30 MG: 100 INJECTION SUBCUTANEOUS at 11:15

## 2021-12-22 RX ADMIN — FAMOTIDINE 20 MG: 10 INJECTION, SOLUTION INTRAVENOUS at 09:28

## 2021-12-22 RX ADMIN — Medication 10 MG/HR: at 00:11

## 2021-12-22 RX ADMIN — Medication 25 MCG/HR: at 09:29

## 2021-12-22 RX ADMIN — CHLORHEXIDINE GLUCONATE 0.12% ORAL RINSE 15 ML: 1.2 LIQUID ORAL at 20:06

## 2021-12-22 ASSESSMENT — PULMONARY FUNCTION TESTS
PIF_VALUE: 27.9
PIF_VALUE: 26
PIF_VALUE: 30
PIF_VALUE: 26
PIF_VALUE: 26.1
PIF_VALUE: 26

## 2021-12-22 NOTE — CARE COORDINATION
Discharge planning update:    Remains intubated on ventilator. FiO2 60%, 50 liters oxygen, PEEP 8 with saturations at 91%. Afebrile. PICC . TF via OG tube with Dietician following. Dopamine, Fentanyl, Insulin, Versed drips with diabetes management. Baricitinib, Lovenox, Pepcid. From home with spouse. Will follow.    Electronically signed by Kathy Merrill RN on 12/22/2021 at 12:21 PM

## 2021-12-22 NOTE — PROGRESS NOTES
Provided update to pt son at the bedside, he asked questions and stated he will pass upsate to other siblings.

## 2021-12-22 NOTE — PROGRESS NOTES
CRITICAL CARE PROGRESS NOTE      Patient:  Maricel Hutchison    Unit/Bed:4B-07/007-A  YOB: 1950  MRN: [id number]   PCP: Dayanna Jones  Date of Admission: 12/18/2021  Chief Complaint:- Acute hypoxic respiratory failure    Assessment and Plan:      Acute hypoxic respiratory failure: Patient initially presented on nasal cannula escalated to high flow and BiPAP. She was intubated on 12/20/2021. Maintain lung protective strategies with peak pressure less than 35. Monitor for ability to wean. COVID-19: Onset of symptoms 12/12/2021. Positive on 12/17, continue Decadron and baricitinib. Severe ARDS: Calculated PF ratio is 117, initiate pronation therapy. Mild hyperkalemia: Mild, monitor patient on insulin drip for uncontrolled diabetes. Diabetes mellitus type 2 uncontrolled: On insulin drip. A1c noted at 9.9  Cardiomyopathy: Ejection fraction noted from 30 to 35%. No noted echo at [facility] are in care everywhere. Patient does have history of stent placement in 2008. Unclear if this is new onset cardiomyopathy or pre-existing. Patient is noted be bradycardic requiring dopamine. Cardiology consulted. Hypertension: Listed in history, currently normotensive. Monitor   Bradycardia:  Requiring dopamine. Cardiology consulted for cardiomyopathy. Obesity: BMI 37.73  CAD: history of stent placement      INITIAL H AND P AND ICU COURSE:  Sandoval Maurice is a 33-year-old white female who presented to [facility] on 12/18/2021 with complaints of shortness of breath she has a past medical history lifetime non-smoker, CAD with stent placement in 2008, hypertension, diabetes mellitus, dyslipidemia. Per report she presented to [facility] on 12/18 with complaints of hypoxia and shortness of breath. She was initially placed on 4 L nasal cannula. 12/19 patient had persistent hypoxia and was transitioned to high flow nasal cannula.   On 12/20 patient was transitioned to BiPAP and transferred to [facility] ICU for intubation. Past Medical History: Per HPI  Family History: No known family history  Social History: Lifetime non-smoker, denies alcohol or drug use. ROS   Sedated on mechanical ventilation     Scheduled Meds:   baricitinib  4 mg Per NG tube Nightly    insulin lispro  0-6 Units SubCUTAneous Q4H    chlorhexidine  15 mL Mouth/Throat BID    famotidine (PEPCID) injection  20 mg IntraVENous BID    aspirin  81 mg Per NG tube Daily    enoxaparin  30 mg SubCUTAneous Q12H    insulin glargine  10 Units SubCUTAneous Daily     Continuous Infusions:   insulin 7.3 Units/hr (12/22/21 1202)    fentaNYL 500 mcg in dextrose 5% 100 ml infusion 25 mcg/hr (12/22/21 0929)    DOPamine 4 mcg/kg/min (12/22/21 1204)    sodium chloride      midazolam 10 mg/hr (12/22/21 1015)    dextrose         PHYSICAL EXAMINATION:  T:  98.1. P:  42. RR:  18. B/P:  146/82. FiO2:  60. O2 Sat:  91.  I/O:  557/1100  Body mass index is 34.75 kg/m². PC: 16/10: TV: 474: RRTotal: 16: Ti:1 sec  General: Acute on chronically ill-appearing white female  HEENT:  normocephalic and atraumatic. No scleral icterus. PERR  Neck: supple. No Thyromegaly. Lungs: diminished to auscultation. No retractions  Cardiac: Bradycardia. No JVD. Abdomen: soft. Nontender. Extremities:  No clubbing, cyanosis, or edema x 4. Vasculature: capillary refill < 3 seconds. Palpable dorsalis pedis pulses. Skin:  warm and dry. Psych: Sedated on mechanical ventilation  Lymph:  No supraclavicular adenopathy. Neurologic:  No focal deficit. No seizures. Data: (All radiographs, tracings, PFTs, and imaging are personally viewed and interpreted unless otherwise noted). Sodium 138, potassium 4.7, chloride 103, CO2 25, BUN 34, creatinine 0.9, anion gap 10.0, glucose 206  WBC 9.1, hemoglobin 12.0, hematocrit 36.9, platelet count 795  Telemetry shows sinus bradycardia  Chest x-ray 12/21/2021 reports PICC line in place.   Patchy bilateral opacities. Echocardiogram 12/21/2021 reports ejection fraction 30 to 35%, global hypokinesis of left ventricle. Systolic function severely reduced. Meets Continued ICU Level Care Criteria:    [x] Yes   [] No - Transfer Planned to listed location:  [] HOSPITALIST CONTACTED-      Case and plan discussed with Dr. Brant Kirkland. Electronically signed by WYATT Guerrero - CNP  CRITICAL CARE SPECIALIST  Patient seen by me. Case discussed with nurse practitioner. Patient remains mechanical ventilator dependent. FiO2 requirements are down to 60%. Pressure control down to 16. Continue to wean as tolerated. .  Italicized font represents changes to the note made by me. CC time 35 minutes. Time was discontiguous. Time does not include procedures. Time does include my direct assessment of the patient and coordination of care.   Electronically signed by Harlan Rankin MD on 12/22/2021 at 1:17 PM

## 2021-12-23 LAB
ANION GAP SERPL CALCULATED.3IONS-SCNC: 12 MEQ/L (ref 8–16)
BASOPHILS # BLD: 0.5 %
BASOPHILS ABSOLUTE: 0.1 THOU/MM3 (ref 0–0.1)
BUN BLDV-MCNC: 38 MG/DL (ref 7–22)
CALCIUM SERPL-MCNC: 8.9 MG/DL (ref 8.5–10.5)
CHLORIDE BLD-SCNC: 107 MEQ/L (ref 98–111)
CO2: 23 MEQ/L (ref 23–33)
CREAT SERPL-MCNC: 0.8 MG/DL (ref 0.4–1.2)
EOSINOPHIL # BLD: 0 %
EOSINOPHILS ABSOLUTE: 0 THOU/MM3 (ref 0–0.4)
ERYTHROCYTE [DISTWIDTH] IN BLOOD BY AUTOMATED COUNT: 12.7 % (ref 11.5–14.5)
ERYTHROCYTE [DISTWIDTH] IN BLOOD BY AUTOMATED COUNT: 42.5 FL (ref 35–45)
GFR SERPL CREATININE-BSD FRML MDRD: 71 ML/MIN/1.73M2
GLUCOSE BLD-MCNC: 193 MG/DL (ref 70–108)
GLUCOSE BLD-MCNC: 296 MG/DL (ref 70–108)
GLUCOSE BLD-MCNC: 341 MG/DL (ref 70–108)
GLUCOSE BLD-MCNC: 344 MG/DL (ref 70–108)
GLUCOSE BLD-MCNC: 350 MG/DL (ref 70–108)
GLUCOSE BLD-MCNC: 358 MG/DL (ref 70–108)
HCT VFR BLD CALC: 41.9 % (ref 37–47)
HEMOGLOBIN: 13.7 GM/DL (ref 12–16)
IMMATURE GRANS (ABS): 0.52 THOU/MM3 (ref 0–0.07)
IMMATURE GRANULOCYTES: 4.2 %
LYMPHOCYTES # BLD: 8.2 %
LYMPHOCYTES ABSOLUTE: 1 THOU/MM3 (ref 1–4.8)
MCH RBC QN AUTO: 29.7 PG (ref 26–33)
MCHC RBC AUTO-ENTMCNC: 32.7 GM/DL (ref 32.2–35.5)
MCV RBC AUTO: 90.9 FL (ref 81–99)
MONOCYTES # BLD: 5.2 %
MONOCYTES ABSOLUTE: 0.6 THOU/MM3 (ref 0.4–1.3)
NUCLEATED RED BLOOD CELLS: 0 /100 WBC
PLATELET # BLD: 219 THOU/MM3 (ref 130–400)
PMV BLD AUTO: 10.3 FL (ref 9.4–12.4)
POTASSIUM SERPL-SCNC: 4.6 MEQ/L (ref 3.5–5.2)
RBC # BLD: 4.61 MILL/MM3 (ref 4.2–5.4)
SEG NEUTROPHILS: 81.9 %
SEGMENTED NEUTROPHILS ABSOLUTE COUNT: 10.2 THOU/MM3 (ref 1.8–7.7)
SODIUM BLD-SCNC: 142 MEQ/L (ref 135–145)
WBC # BLD: 12.4 THOU/MM3 (ref 4.8–10.8)

## 2021-12-23 PROCEDURE — 6370000000 HC RX 637 (ALT 250 FOR IP): Performed by: NURSE PRACTITIONER

## 2021-12-23 PROCEDURE — 2100000000 HC CCU R&B

## 2021-12-23 PROCEDURE — 6370000000 HC RX 637 (ALT 250 FOR IP): Performed by: STUDENT IN AN ORGANIZED HEALTH CARE EDUCATION/TRAINING PROGRAM

## 2021-12-23 PROCEDURE — 2700000000 HC OXYGEN THERAPY PER DAY

## 2021-12-23 PROCEDURE — 94761 N-INVAS EAR/PLS OXIMETRY MLT: CPT

## 2021-12-23 PROCEDURE — 2580000003 HC RX 258: Performed by: NURSE PRACTITIONER

## 2021-12-23 PROCEDURE — 94003 VENT MGMT INPAT SUBQ DAY: CPT

## 2021-12-23 PROCEDURE — 82948 REAGENT STRIP/BLOOD GLUCOSE: CPT

## 2021-12-23 PROCEDURE — 6360000002 HC RX W HCPCS: Performed by: NURSE PRACTITIONER

## 2021-12-23 PROCEDURE — 99223 1ST HOSP IP/OBS HIGH 75: CPT | Performed by: INTERNAL MEDICINE

## 2021-12-23 PROCEDURE — 85025 COMPLETE CBC W/AUTO DIFF WBC: CPT

## 2021-12-23 PROCEDURE — 99291 CRITICAL CARE FIRST HOUR: CPT | Performed by: INTERNAL MEDICINE

## 2021-12-23 PROCEDURE — 6360000002 HC RX W HCPCS: Performed by: PHYSICIAN ASSISTANT

## 2021-12-23 PROCEDURE — 36415 COLL VENOUS BLD VENIPUNCTURE: CPT

## 2021-12-23 PROCEDURE — 80048 BASIC METABOLIC PNL TOTAL CA: CPT

## 2021-12-23 PROCEDURE — 2500000003 HC RX 250 WO HCPCS: Performed by: NURSE PRACTITIONER

## 2021-12-23 RX ORDER — INSULIN GLARGINE 100 [IU]/ML
20 INJECTION, SOLUTION SUBCUTANEOUS NIGHTLY
Status: DISCONTINUED | OUTPATIENT
Start: 2021-12-23 | End: 2021-12-25

## 2021-12-23 RX ORDER — INSULIN GLARGINE 100 [IU]/ML
20 INJECTION, SOLUTION SUBCUTANEOUS DAILY
Status: DISCONTINUED | OUTPATIENT
Start: 2021-12-23 | End: 2021-12-23

## 2021-12-23 RX ORDER — FUROSEMIDE 10 MG/ML
40 INJECTION INTRAMUSCULAR; INTRAVENOUS ONCE
Status: COMPLETED | OUTPATIENT
Start: 2021-12-23 | End: 2021-12-23

## 2021-12-23 RX ADMIN — Medication 50 MCG/HR: at 05:53

## 2021-12-23 RX ADMIN — SODIUM CHLORIDE, PRESERVATIVE FREE 10 ML: 5 INJECTION INTRAVENOUS at 20:12

## 2021-12-23 RX ADMIN — Medication 10 MG/HR: at 17:24

## 2021-12-23 RX ADMIN — DOPAMINE HYDROCHLORIDE IN DEXTROSE 2.51 MCG/KG/MIN: 1.6 INJECTION, SOLUTION INTRAVENOUS at 21:35

## 2021-12-23 RX ADMIN — INSULIN LISPRO 15 UNITS: 100 INJECTION, SOLUTION INTRAVENOUS; SUBCUTANEOUS at 16:25

## 2021-12-23 RX ADMIN — ASPIRIN 81 MG CHEWABLE TABLET 81 MG: 81 TABLET CHEWABLE at 08:23

## 2021-12-23 RX ADMIN — BARICITINIB 4 MG: 2 TABLET, FILM COATED ORAL at 20:11

## 2021-12-23 RX ADMIN — INSULIN GLARGINE 20 UNITS: 100 INJECTION, SOLUTION SUBCUTANEOUS at 21:28

## 2021-12-23 RX ADMIN — CHLORHEXIDINE GLUCONATE 0.12% ORAL RINSE 15 ML: 1.2 LIQUID ORAL at 08:24

## 2021-12-23 RX ADMIN — FENTANYL CITRATE 25 MCG: 50 INJECTION INTRAMUSCULAR; INTRAVENOUS at 05:11

## 2021-12-23 RX ADMIN — INSULIN LISPRO 12 UNITS: 100 INJECTION, SOLUTION INTRAVENOUS; SUBCUTANEOUS at 08:23

## 2021-12-23 RX ADMIN — INSULIN GLARGINE 20 UNITS: 100 INJECTION, SOLUTION SUBCUTANEOUS at 09:53

## 2021-12-23 RX ADMIN — FAMOTIDINE 20 MG: 10 INJECTION, SOLUTION INTRAVENOUS at 20:11

## 2021-12-23 RX ADMIN — Medication 10 MG/HR: at 06:40

## 2021-12-23 RX ADMIN — ENOXAPARIN SODIUM 30 MG: 100 INJECTION SUBCUTANEOUS at 13:14

## 2021-12-23 RX ADMIN — FAMOTIDINE 20 MG: 10 INJECTION, SOLUTION INTRAVENOUS at 08:23

## 2021-12-23 RX ADMIN — INSULIN LISPRO 12 UNITS: 100 INJECTION, SOLUTION INTRAVENOUS; SUBCUTANEOUS at 11:58

## 2021-12-23 RX ADMIN — CHLORHEXIDINE GLUCONATE 0.12% ORAL RINSE 15 ML: 1.2 LIQUID ORAL at 20:12

## 2021-12-23 RX ADMIN — Medication 50 MCG/HR: at 17:30

## 2021-12-23 RX ADMIN — Medication 50 MCG/HR: at 06:40

## 2021-12-23 RX ADMIN — INSULIN LISPRO 9 UNITS: 100 INJECTION, SOLUTION INTRAVENOUS; SUBCUTANEOUS at 20:30

## 2021-12-23 RX ADMIN — FUROSEMIDE 40 MG: 10 INJECTION, SOLUTION INTRAMUSCULAR; INTRAVENOUS at 18:17

## 2021-12-23 ASSESSMENT — PULMONARY FUNCTION TESTS
PIF_VALUE: 26
PIF_VALUE: 25.9
PIF_VALUE: 26

## 2021-12-23 NOTE — PROGRESS NOTES
cannula. 12/19 patient had persistent hypoxia and was transitioned to high flow nasal cannula. On 12/20 patient was transitioned to BiPAP and transferred to [facility] ICU for intubation. Past Medical History: Per HPI  Family History: No known family history  Social History: Lifetime non-smoker, denies alcohol or drug use. ROS   Sedated on mechanical ventilation    Scheduled Meds:   insulin glargine  20 Units SubCUTAneous Daily    insulin lispro  0-18 Units SubCUTAneous Q4H    baricitinib  4 mg Per NG tube Nightly    chlorhexidine  15 mL Mouth/Throat BID    famotidine (PEPCID) injection  20 mg IntraVENous BID    aspirin  81 mg Per NG tube Daily    enoxaparin  30 mg SubCUTAneous Q12H     Continuous Infusions:   fentaNYL 500 mcg in dextrose 5% 100 ml infusion 50 mcg/hr (12/23/21 0640)    DOPamine 2 mcg/kg/min (12/23/21 0649)    sodium chloride      midazolam 10 mg/hr (12/23/21 0640)    dextrose         PHYSICAL EXAMINATION:  T:  98.8.  P:  49. RR:  22. B/P:  101/71 FiO2:  60. O2 Sat:  91.  I/O:  1445/1200  Body mass index is 32.72 kg/m². PC: 16/10: TV: 455: RRTotal: 16: Ti:1 sec  General: Acute on chronically ill-appearing white female  HEENT:  normocephalic and atraumatic. No scleral icterus. PERR  Neck: supple. No Thyromegaly. Lungs: Diminished to auscultation. No retractions  Cardiac: Bradycardia. No JVD. Abdomen: soft. Nontender. Extremities:  No clubbing, cyanosis, or edema x 4. Vasculature: capillary refill < 3 seconds. Palpable dorsalis pedis pulses. Skin:  warm and dry. Psych: Sedated on mechanical ventilation  Lymph:  No supraclavicular adenopathy. Neurologic:  No focal deficit. No seizures. Data: (All radiographs, tracings, PFTs, and imaging are personally viewed and interpreted unless otherwise noted).    Sodium 142, potassium 4.6, chloride 107, CO2 23, BUN 38, creatinine 0.8, anion gap 12.0, glucose 350  WBC 12.4, hemoglobin 13.7, hematocrit 41.9, platelet count 999  Telemetry shows

## 2021-12-23 NOTE — FLOWSHEET NOTE
Contacted family member of COVID-23 patient,  Alcon Avila. Explained availability of support from chaplains during patient's stay. Provided  phone number and hours in hospital. Explained Zoom call availability and how to initiate a Zoom. Contacted family member of COVID-23 patient, Explained availability of support from chaplains during patient's stay. Provided  phone number and hours in hospital. Explained Zoom call availability and how to initiate a Zoom.

## 2021-12-23 NOTE — CONSULTS
hYPOXIA  COBID 19 pna/ards  CMP EF 30 TO 35%- NEW VERSUS OLD ???  BRADYCARDIA- CONT DOPAMINE  HTN  CAD S/P STENT 2008    PLAN  OMT AS TOLERATED  CONT DOPAMINE TO KEEP  HR . 48  AVOID HR REDUCING MEDS IF POSSIBLE  GET RECORD FROM HIS CARDIOLOGIST  WILL FOLLOW    \[id number]    Simona Farfan MD     Conclusions      Summary   Technically difficult study due to poor acoustic windows. Left ventricle size is normal.   Normal left ventricular wall thickness. There was severe global hypokinesis of the left ventricle. Systolic function was severely reduced. Ejection fraction is visually estimated in the range of 30% to 35%.       Signature      ----------------------------------------------------------------   Electronically signed by Mike Edmondson MD (Interpreting   physician) on 12/21/2021 at 07:49 PM

## 2021-12-24 ENCOUNTER — APPOINTMENT (OUTPATIENT)
Dept: GENERAL RADIOLOGY | Age: 71
DRG: 207 | End: 2021-12-24
Payer: MEDICARE

## 2021-12-24 LAB
ALLEN TEST: POSITIVE
ANION GAP SERPL CALCULATED.3IONS-SCNC: 11 MEQ/L (ref 8–16)
BASE EXCESS (CALCULATED): 4 MMOL/L (ref -2.5–2.5)
BASOPHILS # BLD: 0.5 %
BASOPHILS ABSOLUTE: 0.1 THOU/MM3 (ref 0–0.1)
BUN BLDV-MCNC: 42 MG/DL (ref 7–22)
CALCIUM SERPL-MCNC: 9 MG/DL (ref 8.5–10.5)
CHLORIDE BLD-SCNC: 106 MEQ/L (ref 98–111)
CO2: 25 MEQ/L (ref 23–33)
COLLECTED BY:: ABNORMAL
CREAT SERPL-MCNC: 0.9 MG/DL (ref 0.4–1.2)
DEVICE: ABNORMAL
EOSINOPHIL # BLD: 0 %
EOSINOPHILS ABSOLUTE: 0 THOU/MM3 (ref 0–0.4)
ERYTHROCYTE [DISTWIDTH] IN BLOOD BY AUTOMATED COUNT: 12.5 % (ref 11.5–14.5)
ERYTHROCYTE [DISTWIDTH] IN BLOOD BY AUTOMATED COUNT: 43 FL (ref 35–45)
GFR SERPL CREATININE-BSD FRML MDRD: 62 ML/MIN/1.73M2
GLUCOSE BLD-MCNC: 180 MG/DL (ref 70–108)
GLUCOSE BLD-MCNC: 183 MG/DL (ref 70–108)
GLUCOSE BLD-MCNC: 193 MG/DL (ref 70–108)
GLUCOSE BLD-MCNC: 203 MG/DL (ref 70–108)
GLUCOSE BLD-MCNC: 208 MG/DL (ref 70–108)
GLUCOSE BLD-MCNC: 209 MG/DL (ref 70–108)
GLUCOSE BLD-MCNC: 211 MG/DL (ref 70–108)
GLUCOSE BLD-MCNC: 216 MG/DL (ref 70–108)
GLUCOSE BLD-MCNC: 225 MG/DL (ref 70–108)
GRAM STAIN RESULT: ABNORMAL
HCO3: 30 MMOL/L (ref 23–28)
HCT VFR BLD CALC: 45.1 % (ref 37–47)
HEMOGLOBIN: 14.4 GM/DL (ref 12–16)
IFIO2: 60
IMMATURE GRANS (ABS): 0.74 THOU/MM3 (ref 0–0.07)
IMMATURE GRANULOCYTES: 5.1 %
LYMPHOCYTES # BLD: 5.6 %
LYMPHOCYTES ABSOLUTE: 0.8 THOU/MM3 (ref 1–4.8)
MCH RBC QN AUTO: 29.7 PG (ref 26–33)
MCHC RBC AUTO-ENTMCNC: 31.9 GM/DL (ref 32.2–35.5)
MCV RBC AUTO: 93 FL (ref 81–99)
MODE: ABNORMAL
MONOCYTES # BLD: 10 %
MONOCYTES ABSOLUTE: 1.4 THOU/MM3 (ref 0.4–1.3)
NUCLEATED RED BLOOD CELLS: 0 /100 WBC
O2 SATURATION: 96 %
ORGANISM: ABNORMAL
ORGANISM: ABNORMAL
PATHOLOGIST REVIEW: ABNORMAL
PCO2: 49 MMHG (ref 35–45)
PH BLOOD GAS: 7.39 (ref 7.35–7.45)
PIP: 26 CMH2O
PLATELET # BLD: 267 THOU/MM3 (ref 130–400)
PLATELET ESTIMATE: ADEQUATE
PMV BLD AUTO: 10 FL (ref 9.4–12.4)
PO2: 83 MMHG (ref 71–104)
POTASSIUM SERPL-SCNC: 4.5 MEQ/L (ref 3.5–5.2)
RBC # BLD: 4.85 MILL/MM3 (ref 4.2–5.4)
RESPIRATORY CULTURE: ABNORMAL
SCAN OF BLOOD SMEAR: NORMAL
SEG NEUTROPHILS: 78.8 %
SEGMENTED NEUTROPHILS ABSOLUTE COUNT: 11.3 THOU/MM3 (ref 1.8–7.7)
SET PEEP: 10 MMHG
SET RESPIRATORY RATE: 16 BPM
SODIUM BLD-SCNC: 142 MEQ/L (ref 135–145)
SOURCE, BLOOD GAS: ABNORMAL
WBC # BLD: 14.4 THOU/MM3 (ref 4.8–10.8)

## 2021-12-24 PROCEDURE — 6360000002 HC RX W HCPCS: Performed by: NURSE PRACTITIONER

## 2021-12-24 PROCEDURE — 2580000003 HC RX 258: Performed by: NURSE PRACTITIONER

## 2021-12-24 PROCEDURE — 2100000000 HC CCU R&B

## 2021-12-24 PROCEDURE — 2500000003 HC RX 250 WO HCPCS: Performed by: NURSE PRACTITIONER

## 2021-12-24 PROCEDURE — 99291 CRITICAL CARE FIRST HOUR: CPT | Performed by: INTERNAL MEDICINE

## 2021-12-24 PROCEDURE — 36415 COLL VENOUS BLD VENIPUNCTURE: CPT

## 2021-12-24 PROCEDURE — 6360000002 HC RX W HCPCS: Performed by: PHYSICIAN ASSISTANT

## 2021-12-24 PROCEDURE — 94003 VENT MGMT INPAT SUBQ DAY: CPT

## 2021-12-24 PROCEDURE — 82803 BLOOD GASES ANY COMBINATION: CPT

## 2021-12-24 PROCEDURE — 71045 X-RAY EXAM CHEST 1 VIEW: CPT

## 2021-12-24 PROCEDURE — 6370000000 HC RX 637 (ALT 250 FOR IP): Performed by: NURSE PRACTITIONER

## 2021-12-24 PROCEDURE — 36600 WITHDRAWAL OF ARTERIAL BLOOD: CPT

## 2021-12-24 PROCEDURE — 94761 N-INVAS EAR/PLS OXIMETRY MLT: CPT

## 2021-12-24 PROCEDURE — 85025 COMPLETE CBC W/AUTO DIFF WBC: CPT

## 2021-12-24 PROCEDURE — 2700000000 HC OXYGEN THERAPY PER DAY

## 2021-12-24 PROCEDURE — 80048 BASIC METABOLIC PNL TOTAL CA: CPT

## 2021-12-24 PROCEDURE — 6370000000 HC RX 637 (ALT 250 FOR IP): Performed by: STUDENT IN AN ORGANIZED HEALTH CARE EDUCATION/TRAINING PROGRAM

## 2021-12-24 PROCEDURE — 82948 REAGENT STRIP/BLOOD GLUCOSE: CPT

## 2021-12-24 RX ORDER — FUROSEMIDE 10 MG/ML
40 INJECTION INTRAMUSCULAR; INTRAVENOUS DAILY
Status: DISCONTINUED | OUTPATIENT
Start: 2021-12-24 | End: 2022-01-04

## 2021-12-24 RX ADMIN — Medication 10 MG/HR: at 14:09

## 2021-12-24 RX ADMIN — FAMOTIDINE 20 MG: 10 INJECTION, SOLUTION INTRAVENOUS at 08:18

## 2021-12-24 RX ADMIN — INSULIN LISPRO 6 UNITS: 100 INJECTION, SOLUTION INTRAVENOUS; SUBCUTANEOUS at 16:15

## 2021-12-24 RX ADMIN — CHLORHEXIDINE GLUCONATE 0.12% ORAL RINSE 15 ML: 1.2 LIQUID ORAL at 19:33

## 2021-12-24 RX ADMIN — INSULIN LISPRO 3 UNITS: 100 INJECTION, SOLUTION INTRAVENOUS; SUBCUTANEOUS at 08:19

## 2021-12-24 RX ADMIN — Medication 75 MCG/HR: at 23:15

## 2021-12-24 RX ADMIN — CEFTRIAXONE SODIUM 1000 MG: 1 INJECTION, POWDER, FOR SOLUTION INTRAMUSCULAR; INTRAVENOUS at 14:11

## 2021-12-24 RX ADMIN — Medication 50 MCG/HR: at 04:22

## 2021-12-24 RX ADMIN — ASPIRIN 81 MG CHEWABLE TABLET 81 MG: 81 TABLET CHEWABLE at 08:18

## 2021-12-24 RX ADMIN — ENOXAPARIN SODIUM 30 MG: 100 INJECTION SUBCUTANEOUS at 18:09

## 2021-12-24 RX ADMIN — ENOXAPARIN SODIUM 30 MG: 100 INJECTION SUBCUTANEOUS at 00:31

## 2021-12-24 RX ADMIN — INSULIN LISPRO 6 UNITS: 100 INJECTION, SOLUTION INTRAVENOUS; SUBCUTANEOUS at 19:57

## 2021-12-24 RX ADMIN — CHLORHEXIDINE GLUCONATE 0.12% ORAL RINSE 15 ML: 1.2 LIQUID ORAL at 08:19

## 2021-12-24 RX ADMIN — FAMOTIDINE 20 MG: 10 INJECTION, SOLUTION INTRAVENOUS at 19:33

## 2021-12-24 RX ADMIN — INSULIN LISPRO 6 UNITS: 100 INJECTION, SOLUTION INTRAVENOUS; SUBCUTANEOUS at 11:54

## 2021-12-24 RX ADMIN — Medication 10 MG/HR: at 03:56

## 2021-12-24 RX ADMIN — FUROSEMIDE 40 MG: 10 INJECTION, SOLUTION INTRAMUSCULAR; INTRAVENOUS at 08:18

## 2021-12-24 RX ADMIN — BARICITINIB 4 MG: 2 TABLET, FILM COATED ORAL at 19:33

## 2021-12-24 RX ADMIN — Medication 50 MCG/HR: at 14:09

## 2021-12-24 RX ADMIN — INSULIN LISPRO 6 UNITS: 100 INJECTION, SOLUTION INTRAVENOUS; SUBCUTANEOUS at 00:30

## 2021-12-24 RX ADMIN — INSULIN LISPRO 3 UNITS: 100 INJECTION, SOLUTION INTRAVENOUS; SUBCUTANEOUS at 23:59

## 2021-12-24 RX ADMIN — INSULIN LISPRO 6 UNITS: 100 INJECTION, SOLUTION INTRAVENOUS; SUBCUTANEOUS at 03:56

## 2021-12-24 RX ADMIN — INSULIN GLARGINE 20 UNITS: 100 INJECTION, SOLUTION SUBCUTANEOUS at 22:24

## 2021-12-24 RX ADMIN — CEFTRIAXONE SODIUM 1000 MG: 1 INJECTION, POWDER, FOR SOLUTION INTRAMUSCULAR; INTRAVENOUS at 23:55

## 2021-12-24 ASSESSMENT — PULMONARY FUNCTION TESTS
PIF_VALUE: 26

## 2021-12-24 NOTE — PROGRESS NOTES
CRITICAL CARE PROGRESS NOTE      Patient:  Jono Bosch    Unit/Bed:4B-07/007-A  YOB: 1950  MRN: [id number]   PCP: Lidia Medeiros  Date of Admission: 12/18/2021  Chief Complaint:- Acute hypoxic respiratory failure    Assessment and Plan:      Acute hypoxic respiratory failure: Patient initially presented on nasal cannula escalated to high flow and BiPAP. She was intubated on 12/20/2021. Maintain lung protective strategies with peak pressure less than 35. Monitor for ability to wean. COVID-19: Onset of symptoms 12/12/2021. Positive on 12/17, continue Decadron and baricitinib. Severe ARDS: Calculated PF ratio is 117, initiate pronation therapy. Bacterial pneumonia: Respiratory culture positive for Klebsiella oxytocin, sensitivity to Rocephin. Initiate Rocephin day #1. Mild hyperkalemia: Mild, monitor patient on insulin drip for uncontrolled diabetes. Diabetes mellitus type 2 uncontrolled: s/p insulin drip. A1c noted at 9.9. On sliding scale insulin and Humalog. Cardiomyopathy: Ejection fraction noted from 30 to 35%. No noted echo at [facility] are in care everywhere. Patient does have history of stent placement in 2008. Unclear if this is new onset cardiomyopathy or pre-existing. Patient is noted be bradycardic requiring dopamine. Cardiology consulted. Lasix 40 mg daily  Hypertension: Listed in history, currently normotensive. Monitor   Bradycardia:  Requiring dopamine. Cardiology consulted for cardiomyopathy. Leukocytosis: WBC 14.4, no fever. No signs of secondary infection. Obesity: BMI 30.65  CAD: history of stent placement, 2008     INITIAL H AND P AND ICU COURSE:  Eladia Escobar is a 79-year-old white female who presented to [facility] on 12/18/2021 with complaints of shortness of breath she has a past medical history lifetime non-smoker, CAD with stent placement in 2008, hypertension, diabetes mellitus, dyslipidemia.   Per report she presented to [facility] [address] [place] [address] on 12/18 with complaints of hypoxia and shortness of breath. She was initially placed on 4 L nasal cannula. 12/19 patient had persistent hypoxia and was transitioned to high flow nasal cannula. On 12/20 patient was transitioned to BiPAP and transferred to [facility] ICU for intubation. Past Medical History: Per HPI  Family History: No known family history  Social History: Lifetime non-smoker, denies alcohol or drug use. ROS   Sedated on mechanical ventilation     Scheduled Meds:   insulin lispro  0-18 Units SubCUTAneous Q4H    insulin glargine  20 Units SubCUTAneous Nightly    baricitinib  4 mg Per NG tube Nightly    chlorhexidine  15 mL Mouth/Throat BID    famotidine (PEPCID) injection  20 mg IntraVENous BID    aspirin  81 mg Per NG tube Daily    enoxaparin  30 mg SubCUTAneous Q12H     Continuous Infusions:   fentaNYL 500 mcg in dextrose 5% 100 ml infusion 50 mcg/hr (12/24/21 0422)    DOPamine 2.507 mcg/kg/min (12/24/21 0630)    sodium chloride      midazolam 10 mg/hr (12/24/21 0356)    dextrose         PHYSICAL EXAMINATION:  T:  98.8. P:  63. RR:  16. B/P:  107/73. FiO2:  60. O2 Sat:  92.  I/O:  1453/2250  Body mass index is 30.65 kg/m². PC: 16/10: TV: 557: RRTotal: 18: Ti:1 sec   General: Acute on chronically ill-appearing white female  HEENT:  normocephalic and atraumatic. No scleral icterus. PERR  Neck: supple. No Thyromegaly. Lungs: clear to auscultation. No retractions  Cardiac: RRR. No JVD. Abdomen: soft. Nontender. Extremities:  No clubbing, cyanosis, or edema x 4. Vasculature: capillary refill < 3 seconds. Palpable dorsalis pedis pulses. Skin:  warm and dry. Psych: Sedated on mechanical ventilation  Lymph:  No supraclavicular adenopathy. Neurologic:  No focal deficit. No seizures. Data: (All radiographs, tracings, PFTs, and imaging are personally viewed and interpreted unless otherwise noted).    Sodium 142, potassium 4.5, chloride 106, CO2 25, BUN 42, creatinine 0.9, anion gap 11.0, glucose 209. WBC 14.4, hemoglobin 14.4, hematocrit 45.1, platelet count 753  Telemetry shows sinus bradycardia  Chest x-ray 12/24/2021 reports diffuse scattered infiltrates and airspace disease throughout bilateral lungs. More complex consolidation in the right lung base. Echocardiogram 12/21/2021 reports ejection fraction 30 to 35%        Meets Continued ICU Level Care Criteria:    [x] Yes   [] No - Transfer Planned to listed location:  [] HOSPITALIST CONTACTED-      Case and plan discussed with Dr. David Wong. Electronically signed by Alexus Nicole. WYATT Barnoe - [facility]  CRITICAL CARE SPECIALIST  Patient seen by me. Case discussed with nurse practitioner. Patient started on Rocephin for Klebsiella oxytoca. Patient unable to wean from mechanical ventilator. Very little improvement while on mechanical ventilator. At risk for requiring tracheostomy tube. .  Italicized font represents changes to the note made by me. CC time 35 minutes. Time was discontiguous. Time does not include procedures. Time does include my direct assessment of the patient and coordination of care.   Electronically signed by Charisma Bashir MD on 12/24/2021 at 4:47 PM

## 2021-12-24 NOTE — CONSULTS
[address] [place] [address]                                  CONSULTATION    PATIENT NAME: Aruna Herbert                      :        1950  MED REC NO:   [id number]                           ROOM:       0007  ACCOUNT NO:   [de-identified]                           ADMIT DATE: 2021  PROVIDER:     Vero Stevens. Ben Bill M.D.    Wesly Neat:  2021    REASON FOR CONSULTATION:  Cardiomyopathy, ejection fraction 30 to 35% on  most recent echo and bradycardia in a 33-year-old female patient  admitted with COVID-19 pneumonia, hypoxic respiratory failure, on  mechanical ventilator, on fentanyl and Versed drip now, and getting  dopamine drip to maintain heart rate more than 50. HISTORY OF PRESENT ILLNESS:  This is a 33-year-old  female  patient, presented to [address] [place] [address] on 2021 with a  complaint of shortness of breath. The patient is known to have coronary  artery disease, status post previous stent placement in ;  hypertension; diabetes, and hyperlipidemia. In the emergency room, she  has been noted to be hypoxic and she requires four liters of nasal  cannula oxygen. On the course, hypoxia progressively got worse, and she  transitioned to a high-flow nasal cannula, and further on 2021,  she was transitioned further on to BiPAP and finally, the patient  transferred to the [facility] ICU where she has been intubated and has  been intubated in the last three days. The patient postintubation, she  has been on fentanyl and Versed, and she became bradycardic, heart rate  got too low, so requiring dopamine, now she is on dopamine 2.5  mcg/kg/minute and her heart rate in the high 40s and low 50s and sinus  rhythm. Recent echocardiogram was done on 2021:  Ejection fraction was 30  to 35%.   Unknown if the patient has previous cardiomyopathy, but  however, we know that the patient has previous coronary artery disease  with stent placement. History obtained from the review of the record. Further history cannot be obtained as the patient is on mechanical  ventilator. Family by the bedside, her daughter by the bedside during my evaluation. The patient is on a mechanical ventilator and also in a prone position  for a better saturation. REVIEW OF SYSTEMS:  Further cannot be obtained. PAST MEDICAL HISTORY:  Includes the following:  Coronary artery disease,  status post previous stent placement in 2008; hypertension; diabetes;  hyperlipidemia and lifetime nonsmoker, never smoked. PAST SURGICAL HISTORY:  None in the record. FAMILY HISTORY:  None in the record. SOCIAL HISTORY:  Nonsmoker. No alcohol. No drug use. ALLERGIES:  SHE IS ALLERGIC TO SULFA AND CODEINE. MEDICATIONS:  Home medications prior to this admission include the  following:  Vitamin C, aspirin, Zithromax, Farxiga, Decadron, Pepcid,  Glucotrol, Vistaril, Nitrostat, Crestor, Januvia and zinc.    PHYSICAL EXAMINATION:  GENERAL:  Looks sick, in respiratory distress, on a mechanical  ventilator, requiring prone position for a better saturation. VITAL SIGNS:  Blood pressure 118/75, pulse rate 50, respiratory rate 18,  and temperature 97.9. HEENT:  Pink conjunctivae. Nonicteric sclerae. Pupils are equal and  reactive bilaterally to light. NECK:  No lymphadenopathy. No goiter. No JVD. CHEST:  Bilaterally decreased air entry. CARDIOVASCULAR:  Arteries are felt; carotid, femoral, pedal.  No bruits. S1, S2 well heard. No murmur or galloped rhythm. ABDOMEN:  Soft, obese, nontender. Bowel sounds are positive. GENITALIA:  No CVA, flank or suprapubic tenderness. LOCOMOTOR:  No cyanosis, clubbing, muscle or joint tenderness. SKIN:  No rashes, ulcers, or nodules. CNS:  Sedated.     WORKUP:  EKG:  Sinus bradycardia, now on the telemetry in the high 40s  and low 50s, rate 54 beats per minute, no acute EKG abnormality on the  EKG done 12/21/2021. Blood chemistry:  Sodium 142, potassium 4.6, BUN 38, creatinine 0.8, GFR  71. Troponin on admission 0.01 x1. Hematology:  White blood cells 12,  hemoglobin 13.7, platelets 885. Urinalysis was negative. Chest x-ray  revealed bilateral diffuse interstitial infiltrates. Echocardiogram  done on 12/21/2021:  Ejection fraction 30 to 35% with severe global  hypokinesis, poor acoustic window. Currently, the patient is on  fentanyl and dopamine 2.5 mcg/kg/minute; fentanyl 20, Versed 10. The patient is also on Lovenox, baricitinib, aspirin, Pepcid, insulin. ASSESSMENT:  1. Acute hypoxic respiratory failure secondary to COVID-19 pneumonia,  now on a mechanical ventilator. 2.  COVID-19 pneumonia. 3.  Severe ARDS, and the patient has been initiated on prone therapy. 4.  Severe cardiomyopathy, ejection fraction 30 to 35%. Unknown if this  cardiomyopathy is new or was there before. Certainly if it is new,  could be viral cardiomyopathy of any sort, so could be related to COVID,  but needs to get old record. I could not find anything on the Care  Everywhere. 5.  Sinus bradycardia, could be medication related, fentanyl/Versed or  combination, predominantly fentanyl related, and so I agree with  continuation of dopamine drip. I will increase the dopamine to 3  mcg/kg/minute to keep the heart rate more than 50.  6. Hypertension history _____ normotensive. 7.  Coronary artery disease, status post stent placement in 2008. PLAN AND RECOMMENDATIONS:  1. At this level, with regards to sinus bradycardia, decision of a  pacemaker to be done once the patient recovered and got better, once we  wean her off from fentanyl and Versed. At this time, we will continue  with dopamine drip to maintain her heart rate more than 50. I discussed  with a family member by the bedside, as well as with the nurse.   2.  With regards to the cardiomyopathy, to be started on optimal medical  therapy;

## 2021-12-24 NOTE — PROGRESS NOTES
Patient's rings were cut off due to increased edema right left upper extremity. Rings were placed in the locked cabinet in patient's room. Daughter, Kimberly, notified of this event. Stated she would pick these belongings up tomorrow afternoon.

## 2021-12-24 NOTE — PROGRESS NOTES
Updated daughter, Yesenia Shah, with plan of care. She stated she would update patient's spouse and other family members. All questions answered at this time and encouraged family to call if further questions arise.

## 2021-12-25 ENCOUNTER — APPOINTMENT (OUTPATIENT)
Dept: GENERAL RADIOLOGY | Age: 71
DRG: 207 | End: 2021-12-25
Payer: MEDICARE

## 2021-12-25 LAB
ALLEN TEST: POSITIVE
ANION GAP SERPL CALCULATED.3IONS-SCNC: 10 MEQ/L (ref 8–16)
BASE EXCESS (CALCULATED): 6 MMOL/L (ref -2.5–2.5)
BASOPHILS # BLD: 0.2 %
BASOPHILS ABSOLUTE: 0 THOU/MM3 (ref 0–0.1)
BUN BLDV-MCNC: 44 MG/DL (ref 7–22)
CALCIUM SERPL-MCNC: 8.8 MG/DL (ref 8.5–10.5)
CHLORIDE BLD-SCNC: 102 MEQ/L (ref 98–111)
CO2: 28 MEQ/L (ref 23–33)
COLLECTED BY:: ABNORMAL
CREAT SERPL-MCNC: 0.8 MG/DL (ref 0.4–1.2)
DEVICE: ABNORMAL
EOSINOPHIL # BLD: 1.1 %
EOSINOPHILS ABSOLUTE: 0.1 THOU/MM3 (ref 0–0.4)
ERYTHROCYTE [DISTWIDTH] IN BLOOD BY AUTOMATED COUNT: 12.3 % (ref 11.5–14.5)
ERYTHROCYTE [DISTWIDTH] IN BLOOD BY AUTOMATED COUNT: 41.4 FL (ref 35–45)
GFR SERPL CREATININE-BSD FRML MDRD: 71 ML/MIN/1.73M2
GLUCOSE BLD-MCNC: 191 MG/DL (ref 70–108)
GLUCOSE BLD-MCNC: 219 MG/DL (ref 70–108)
GLUCOSE BLD-MCNC: 225 MG/DL (ref 70–108)
GLUCOSE BLD-MCNC: 242 MG/DL (ref 70–108)
GLUCOSE BLD-MCNC: 281 MG/DL (ref 70–108)
GLUCOSE BLD-MCNC: 306 MG/DL (ref 70–108)
GLUCOSE BLD-MCNC: 315 MG/DL (ref 70–108)
HCO3: 31 MMOL/L (ref 23–28)
HCT VFR BLD CALC: 39.4 % (ref 37–47)
HEMOGLOBIN: 12.8 GM/DL (ref 12–16)
IFIO2: 70
IMMATURE GRANS (ABS): 0.33 THOU/MM3 (ref 0–0.07)
IMMATURE GRANULOCYTES: 2.7 %
LYMPHOCYTES # BLD: 10.3 %
LYMPHOCYTES ABSOLUTE: 1.3 THOU/MM3 (ref 1–4.8)
MCH RBC QN AUTO: 30 PG (ref 26–33)
MCHC RBC AUTO-ENTMCNC: 32.5 GM/DL (ref 32.2–35.5)
MCV RBC AUTO: 92.5 FL (ref 81–99)
MODE: ABNORMAL
MONOCYTES # BLD: 7 %
MONOCYTES ABSOLUTE: 0.9 THOU/MM3 (ref 0.4–1.3)
NUCLEATED RED BLOOD CELLS: 0 /100 WBC
O2 SATURATION: 91 %
PCO2: 47 MMHG (ref 35–45)
PH BLOOD GAS: 7.43 (ref 7.35–7.45)
PIP: 26 CMH2O
PLATELET # BLD: 209 THOU/MM3 (ref 130–400)
PMV BLD AUTO: 10.4 FL (ref 9.4–12.4)
PO2: 59 MMHG (ref 71–104)
POTASSIUM SERPL-SCNC: 4.3 MEQ/L (ref 3.5–5.2)
PRO-BNP: 115.5 PG/ML (ref 0–900)
PROCALCITONIN: 0.06 NG/ML (ref 0.01–0.09)
RBC # BLD: 4.26 MILL/MM3 (ref 4.2–5.4)
SEG NEUTROPHILS: 78.7 %
SEGMENTED NEUTROPHILS ABSOLUTE COUNT: 9.6 THOU/MM3 (ref 1.8–7.7)
SET PEEP: 10 MMHG
SET RESPIRATORY RATE: 16 BPM
SODIUM BLD-SCNC: 140 MEQ/L (ref 135–145)
SOURCE, BLOOD GAS: ABNORMAL
WBC # BLD: 12.2 THOU/MM3 (ref 4.8–10.8)

## 2021-12-25 PROCEDURE — 2580000003 HC RX 258: Performed by: NURSE PRACTITIONER

## 2021-12-25 PROCEDURE — 84145 PROCALCITONIN (PCT): CPT

## 2021-12-25 PROCEDURE — 6360000002 HC RX W HCPCS: Performed by: NURSE PRACTITIONER

## 2021-12-25 PROCEDURE — 83880 ASSAY OF NATRIURETIC PEPTIDE: CPT

## 2021-12-25 PROCEDURE — 85025 COMPLETE CBC W/AUTO DIFF WBC: CPT

## 2021-12-25 PROCEDURE — 71045 X-RAY EXAM CHEST 1 VIEW: CPT

## 2021-12-25 PROCEDURE — 82948 REAGENT STRIP/BLOOD GLUCOSE: CPT

## 2021-12-25 PROCEDURE — 36600 WITHDRAWAL OF ARTERIAL BLOOD: CPT

## 2021-12-25 PROCEDURE — 99291 CRITICAL CARE FIRST HOUR: CPT | Performed by: INTERNAL MEDICINE

## 2021-12-25 PROCEDURE — 82803 BLOOD GASES ANY COMBINATION: CPT

## 2021-12-25 PROCEDURE — 36415 COLL VENOUS BLD VENIPUNCTURE: CPT

## 2021-12-25 PROCEDURE — 2100000000 HC CCU R&B

## 2021-12-25 PROCEDURE — 80048 BASIC METABOLIC PNL TOTAL CA: CPT

## 2021-12-25 PROCEDURE — 6360000002 HC RX W HCPCS: Performed by: INTERNAL MEDICINE

## 2021-12-25 PROCEDURE — 6370000000 HC RX 637 (ALT 250 FOR IP): Performed by: INTERNAL MEDICINE

## 2021-12-25 PROCEDURE — 94003 VENT MGMT INPAT SUBQ DAY: CPT

## 2021-12-25 PROCEDURE — 6360000002 HC RX W HCPCS: Performed by: PHYSICIAN ASSISTANT

## 2021-12-25 PROCEDURE — 2500000003 HC RX 250 WO HCPCS: Performed by: NURSE PRACTITIONER

## 2021-12-25 PROCEDURE — 6370000000 HC RX 637 (ALT 250 FOR IP): Performed by: NURSE PRACTITIONER

## 2021-12-25 RX ORDER — DEXAMETHASONE SODIUM PHOSPHATE 4 MG/ML
6 INJECTION, SOLUTION INTRA-ARTICULAR; INTRALESIONAL; INTRAMUSCULAR; INTRAVENOUS; SOFT TISSUE EVERY 24 HOURS
Status: COMPLETED | OUTPATIENT
Start: 2021-12-26 | End: 2022-01-01

## 2021-12-25 RX ORDER — INSULIN GLARGINE 100 [IU]/ML
35 INJECTION, SOLUTION SUBCUTANEOUS NIGHTLY
Status: DISCONTINUED | OUTPATIENT
Start: 2021-12-25 | End: 2021-12-26

## 2021-12-25 RX ADMIN — INSULIN LISPRO 6 UNITS: 100 INJECTION, SOLUTION INTRAVENOUS; SUBCUTANEOUS at 03:34

## 2021-12-25 RX ADMIN — Medication 50 MCG/HR: at 20:37

## 2021-12-25 RX ADMIN — FAMOTIDINE 20 MG: 10 INJECTION, SOLUTION INTRAVENOUS at 10:54

## 2021-12-25 RX ADMIN — ENOXAPARIN SODIUM 30 MG: 100 INJECTION SUBCUTANEOUS at 00:57

## 2021-12-25 RX ADMIN — DEXAMETHASONE SODIUM PHOSPHATE 6 MG: 4 INJECTION, SOLUTION INTRA-ARTICULAR; INTRALESIONAL; INTRAMUSCULAR; INTRAVENOUS; SOFT TISSUE at 23:37

## 2021-12-25 RX ADMIN — INSULIN LISPRO 12 UNITS: 100 INJECTION, SOLUTION INTRAVENOUS; SUBCUTANEOUS at 15:47

## 2021-12-25 RX ADMIN — CHLORHEXIDINE GLUCONATE 0.12% ORAL RINSE 15 ML: 1.2 LIQUID ORAL at 17:41

## 2021-12-25 RX ADMIN — BARICITINIB 4 MG: 2 TABLET, FILM COATED ORAL at 20:22

## 2021-12-25 RX ADMIN — Medication 50 MCG/HR: at 10:15

## 2021-12-25 RX ADMIN — CEFTRIAXONE SODIUM 1000 MG: 1 INJECTION, POWDER, FOR SOLUTION INTRAMUSCULAR; INTRAVENOUS at 15:10

## 2021-12-25 RX ADMIN — INSULIN LISPRO 6 UNITS: 100 INJECTION, SOLUTION INTRAVENOUS; SUBCUTANEOUS at 20:23

## 2021-12-25 RX ADMIN — ENOXAPARIN SODIUM 30 MG: 100 INJECTION SUBCUTANEOUS at 17:41

## 2021-12-25 RX ADMIN — SODIUM CHLORIDE, PRESERVATIVE FREE 10 ML: 5 INJECTION INTRAVENOUS at 07:55

## 2021-12-25 RX ADMIN — FAMOTIDINE 20 MG: 10 INJECTION, SOLUTION INTRAVENOUS at 20:22

## 2021-12-25 RX ADMIN — CHLORHEXIDINE GLUCONATE 0.12% ORAL RINSE 15 ML: 1.2 LIQUID ORAL at 20:22

## 2021-12-25 RX ADMIN — INSULIN LISPRO 12 UNITS: 100 INJECTION, SOLUTION INTRAVENOUS; SUBCUTANEOUS at 12:15

## 2021-12-25 RX ADMIN — Medication 9 MG/HR: at 13:41

## 2021-12-25 RX ADMIN — INSULIN GLARGINE 35 UNITS: 100 INJECTION, SOLUTION SUBCUTANEOUS at 20:23

## 2021-12-25 RX ADMIN — INSULIN LISPRO 6 UNITS: 100 INJECTION, SOLUTION INTRAVENOUS; SUBCUTANEOUS at 08:00

## 2021-12-25 RX ADMIN — FUROSEMIDE 40 MG: 10 INJECTION, SOLUTION INTRAMUSCULAR; INTRAVENOUS at 07:55

## 2021-12-25 RX ADMIN — Medication 9 MG/HR: at 01:36

## 2021-12-25 RX ADMIN — ASPIRIN 81 MG CHEWABLE TABLET 81 MG: 81 TABLET CHEWABLE at 07:55

## 2021-12-25 RX ADMIN — INSULIN LISPRO 3 UNITS: 100 INJECTION, SOLUTION INTRAVENOUS; SUBCUTANEOUS at 23:30

## 2021-12-25 ASSESSMENT — PULMONARY FUNCTION TESTS
PIF_VALUE: 28
PIF_VALUE: 28.1
PIF_VALUE: 28
PIF_VALUE: 25.7
PIF_VALUE: 28
PIF_VALUE: 28

## 2021-12-25 NOTE — PROGRESS NOTES
Updated patient's daughter, Yon Gonsales, at bedside during visitation hours. Kimberly voiced satisfaction with plan of care.

## 2021-12-25 NOTE — PROGRESS NOTES
CRITICAL CARE PROGRESS NOTE      Patient:  Juana Mallory    Unit/Bed:4B-07/007-A  YOB: 1950  MRN: [id number]   PCP: Edu Harmon  Date of Admission: 12/18/2021  Chief Complaint:- Acute hypoxic respiratory failure    Assessment and Plan:      1. Acute hypoxic respiratory failure: Patient initially presented on nasal cannula escalated to high flow and BiPAP. She was intubated on 12/20/2021. Maintain lung protective strategies with peak pressure less than 35. Not ready to wean. 2. COVID-19: Onset of symptoms 12/12/2021. Positive on 12/17, continue Decadron and baricitinib. 3. Severe ARDS: on pronation therapy will continue. 4. Bacterial pneumonia: Respiratory culture positive for Klebsiella oxytocin, sensitivity to Rocephin. Initiate Rocephin day #2.  5. Mild hyperkalemia: Mild, monitor patient on insulin drip for uncontrolled diabetes. 6. Diabetes mellitus type 2 uncontrolled: s/p insulin drip. A1c noted at 9.9. On sliding scale insulin and Humalog, lantus done adjusted   7. Cardiomyopathy: Ejection fraction noted from 30 to 35%. No noted echo at [facility] 92 are in care everywhere. Patient does have history of stent placement in 2008. Unclear if this is new onset cardiomyopathy or pre-existing. Patient is noted be bradycardic requiring dopamine. Cardiology consulted. Lasix 40 mg daily  8. Hypertension: Listed in history, currently normotensive. Monitor   9. Bradycardia:  Requiring dopamine. Cardiology consulted for cardiomyopathy. 10. Leukocytosis: WBC 14.4, no fever. No signs of secondary infection. 11. Obesity: BMI 30.65  12. CAD: history of stent placement, 2008     INITIAL H AND P AND ICU COURSE:  Orlando Guidry is a 82-year-old white female who presented to [facility] on 12/18/2021 with complaints of shortness of breath she has a past medical history lifetime non-smoker, CAD with stent placement in 2008, hypertension, diabetes mellitus, dyslipidemia.   Per report she presented to [facility] on 12/18 with complaints of hypoxia and shortness of breath. She was initially placed on 4 L nasal cannula. 12/19 patient had persistent hypoxia and was transitioned to high flow nasal cannula. On 12/20 patient was transitioned to BiPAP and transferred to [facility] ICU for intubation. Past Medical History: Per HPI  Family History: No known family history  Social History: Lifetime non-smoker, denies alcohol or drug use. ROS   Sedated on mechanical ventilation     Scheduled Meds:   insulin glargine  35 Units SubCUTAneous Nightly    [START ON 12/26/2021] dexamethasone  6 mg IntraVENous Q24H    furosemide  40 mg IntraVENous Daily    cefTRIAXone (ROCEPHIN) IV  1,000 mg IntraVENous Q24H    insulin lispro  0-18 Units SubCUTAneous Q4H    baricitinib  4 mg Per NG tube Nightly    chlorhexidine  15 mL Mouth/Throat BID    famotidine (PEPCID) injection  20 mg IntraVENous BID    aspirin  81 mg Per NG tube Daily    enoxaparin  30 mg SubCUTAneous Q12H     Continuous Infusions:   fentaNYL 500 mcg in dextrose 5% 100 ml infusion 50 mcg/hr (12/25/21 1015)    DOPamine Stopped (12/25/21 0141)    sodium chloride      midazolam 9 mg/hr (12/25/21 1341)    dextrose         PHYSICAL EXAMINATION:  T:  98.8. P:  63. RR:  16. B/P:  107/73. FiO2:  60. O2 Sat:  92.  I/O:  1453/2250  Body mass index is 32.94 kg/m². PC: 16/10: TV: 557: RRTotal: 18: Ti:1 sec   General: Acute on chronically ill-appearing white female  HEENT:  normocephalic and atraumatic. No scleral icterus. PERR  Neck: supple. No Thyromegaly. Lungs: clear to auscultation. No retractions  Cardiac: RRR. No JVD. Abdomen: soft. Nontender. Extremities:  No clubbing, cyanosis, or edema x 4. Vasculature: capillary refill < 3 seconds. Palpable dorsalis pedis pulses. Skin:  warm and dry. Psych: Sedated on mechanical ventilation  Lymph:  No supraclavicular adenopathy. Neurologic:  No focal deficit.  No seizures.     Data:     Lab results reviewed     Meets Continued ICU Level Care Criteria:    [x] Yes   [] No - Transfer Planned to listed location:  [] HOSPITALIST CONTACTED- DR FRANCISCO time 35 min     Electronically signed by Ronnie Angela MD on 12/25/2021 at 5:01 PM

## 2021-12-26 LAB
GLUCOSE BLD-MCNC: 257 MG/DL (ref 70–108)
GLUCOSE BLD-MCNC: 263 MG/DL (ref 70–108)
GLUCOSE BLD-MCNC: 284 MG/DL (ref 70–108)
GLUCOSE BLD-MCNC: 287 MG/DL (ref 70–108)
GLUCOSE BLD-MCNC: 317 MG/DL (ref 70–108)

## 2021-12-26 PROCEDURE — 6370000000 HC RX 637 (ALT 250 FOR IP): Performed by: INTERNAL MEDICINE

## 2021-12-26 PROCEDURE — 6370000000 HC RX 637 (ALT 250 FOR IP): Performed by: NURSE PRACTITIONER

## 2021-12-26 PROCEDURE — 6360000002 HC RX W HCPCS: Performed by: NURSE PRACTITIONER

## 2021-12-26 PROCEDURE — 2100000000 HC CCU R&B

## 2021-12-26 PROCEDURE — 82948 REAGENT STRIP/BLOOD GLUCOSE: CPT

## 2021-12-26 PROCEDURE — 2580000003 HC RX 258: Performed by: NURSE PRACTITIONER

## 2021-12-26 PROCEDURE — 6370000000 HC RX 637 (ALT 250 FOR IP): Performed by: PHYSICIAN ASSISTANT

## 2021-12-26 PROCEDURE — 99291 CRITICAL CARE FIRST HOUR: CPT | Performed by: INTERNAL MEDICINE

## 2021-12-26 PROCEDURE — 6360000002 HC RX W HCPCS: Performed by: INTERNAL MEDICINE

## 2021-12-26 PROCEDURE — 6360000002 HC RX W HCPCS: Performed by: PHYSICIAN ASSISTANT

## 2021-12-26 PROCEDURE — 94003 VENT MGMT INPAT SUBQ DAY: CPT

## 2021-12-26 PROCEDURE — 2500000003 HC RX 250 WO HCPCS: Performed by: NURSE PRACTITIONER

## 2021-12-26 RX ORDER — INSULIN GLARGINE 100 [IU]/ML
45 INJECTION, SOLUTION SUBCUTANEOUS NIGHTLY
Status: DISCONTINUED | OUTPATIENT
Start: 2021-12-26 | End: 2021-12-27

## 2021-12-26 RX ORDER — BISACODYL 10 MG
10 SUPPOSITORY, RECTAL RECTAL DAILY PRN
Status: DISCONTINUED | OUTPATIENT
Start: 2021-12-26 | End: 2022-01-14 | Stop reason: HOSPADM

## 2021-12-26 RX ADMIN — CHLORHEXIDINE GLUCONATE 0.12% ORAL RINSE 15 ML: 1.2 LIQUID ORAL at 20:06

## 2021-12-26 RX ADMIN — INSULIN GLARGINE 45 UNITS: 100 INJECTION, SOLUTION SUBCUTANEOUS at 20:17

## 2021-12-26 RX ADMIN — CHLORHEXIDINE GLUCONATE 0.12% ORAL RINSE 15 ML: 1.2 LIQUID ORAL at 07:50

## 2021-12-26 RX ADMIN — ACETAMINOPHEN 650 MG: 325 TABLET ORAL at 07:49

## 2021-12-26 RX ADMIN — Medication 6 MG/HR: at 17:27

## 2021-12-26 RX ADMIN — ASPIRIN 81 MG CHEWABLE TABLET 81 MG: 81 TABLET CHEWABLE at 07:49

## 2021-12-26 RX ADMIN — Medication 50 MCG/HR: at 06:36

## 2021-12-26 RX ADMIN — FAMOTIDINE 20 MG: 10 INJECTION, SOLUTION INTRAVENOUS at 20:06

## 2021-12-26 RX ADMIN — POLYETHYLENE GLYCOL 3350 17 G: 17 POWDER, FOR SOLUTION ORAL at 07:50

## 2021-12-26 RX ADMIN — ENOXAPARIN SODIUM 30 MG: 100 INJECTION SUBCUTANEOUS at 13:15

## 2021-12-26 RX ADMIN — INSULIN LISPRO 9 UNITS: 100 INJECTION, SOLUTION INTRAVENOUS; SUBCUTANEOUS at 20:10

## 2021-12-26 RX ADMIN — CEFTRIAXONE SODIUM 1000 MG: 1 INJECTION, POWDER, FOR SOLUTION INTRAMUSCULAR; INTRAVENOUS at 15:15

## 2021-12-26 RX ADMIN — SODIUM CHLORIDE, PRESERVATIVE FREE 10 ML: 5 INJECTION INTRAVENOUS at 07:50

## 2021-12-26 RX ADMIN — FUROSEMIDE 40 MG: 10 INJECTION, SOLUTION INTRAMUSCULAR; INTRAVENOUS at 07:50

## 2021-12-26 RX ADMIN — INSULIN LISPRO 9 UNITS: 100 INJECTION, SOLUTION INTRAVENOUS; SUBCUTANEOUS at 07:59

## 2021-12-26 RX ADMIN — FAMOTIDINE 20 MG: 10 INJECTION, SOLUTION INTRAVENOUS at 07:49

## 2021-12-26 RX ADMIN — BISACODYL 10 MG: 10 SUPPOSITORY RECTAL at 15:18

## 2021-12-26 RX ADMIN — INSULIN LISPRO 9 UNITS: 100 INJECTION, SOLUTION INTRAVENOUS; SUBCUTANEOUS at 11:18

## 2021-12-26 RX ADMIN — Medication 8 MG/HR: at 00:46

## 2021-12-26 RX ADMIN — Medication 50 MCG/HR: at 17:59

## 2021-12-26 RX ADMIN — INSULIN LISPRO 9 UNITS: 100 INJECTION, SOLUTION INTRAVENOUS; SUBCUTANEOUS at 04:20

## 2021-12-26 RX ADMIN — DEXAMETHASONE SODIUM PHOSPHATE 6 MG: 4 INJECTION, SOLUTION INTRA-ARTICULAR; INTRALESIONAL; INTRAMUSCULAR; INTRAVENOUS; SOFT TISSUE at 23:45

## 2021-12-26 RX ADMIN — Medication 8.8 MG: at 07:49

## 2021-12-26 RX ADMIN — BARICITINIB 4 MG: 2 TABLET, FILM COATED ORAL at 20:06

## 2021-12-26 RX ADMIN — INSULIN LISPRO 12 UNITS: 100 INJECTION, SOLUTION INTRAVENOUS; SUBCUTANEOUS at 15:58

## 2021-12-26 RX ADMIN — ENOXAPARIN SODIUM 30 MG: 100 INJECTION SUBCUTANEOUS at 00:43

## 2021-12-26 ASSESSMENT — PAIN SCALES - GENERAL
PAINLEVEL_OUTOF10: 0
PAINLEVEL_OUTOF10: 1

## 2021-12-26 ASSESSMENT — PULMONARY FUNCTION TESTS
PIF_VALUE: 26.2
PIF_VALUE: 26
PIF_VALUE: 26.1
PIF_VALUE: 26

## 2021-12-26 NOTE — PROGRESS NOTES
Call placed to daughter Karma Rodriguez and spouse Nayan Rodrigues. Updated on care throughout the night. All questions answered at this time.

## 2021-12-26 NOTE — PROGRESS NOTES
CRITICAL CARE PROGRESS NOTE      Patient:  Klaudia Wade    Unit/Bed:4B-07/007-A  YOB: 1950  MRN: [id number]   PCP: Zohra Rae  Date of Admission: 12/18/2021  Chief Complaint:- Acute hypoxic respiratory failure    Assessment and Plan:      1. Acute hypoxic respiratory failure: Patient initially presented on nasal cannula escalated to high flow and BiPAP. She was intubated on 12/20/2021. Maintain lung protective strategies with peak pressure less than 35. Not ready to wean. 2. COVID-19: Onset of symptoms 12/12/2021. Positive on 12/17, continue Decadron and baricitinib. 3. Severe ARDS: on pronation therapy will continue. 4. Bacterial pneumonia: Respiratory culture positive for Klebsiella oxytocin, sensitivity to Rocephin. Initiate Rocephin day #3. 5. Mild hyperkalemia: Mild, monitor patient on insulin drip for uncontrolled diabetes. 6. Diabetes mellitus type 2 uncontrolled: s/p insulin drip. A1c noted at 9.9. On sliding scale insulin and Humalog, lantus done adjusted to 45 U.   7. Cardiomyopathy: Ejection fraction noted from 30 to 35%. No noted echo at [facility] are in care everywhere. Patient does have history of stent placement in 2008. Unclear if this is new onset cardiomyopathy or pre-existing. Patient is noted be bradycardic requiring dopamine. Cardiology consulted. On oral lasix   8. Hypertension: Listed in history, currently normotensive. Monitor   9. Bradycardia:  Requiring dopamine. Cardiology consulted for cardiomyopathy. 10. Leukocytosis: WBC 14.4, no fever. No signs of secondary infection. 11. Obesity: BMI 30.65  12. CAD: history of stent placement, 2008. INITIAL H AND P AND ICU COURSE:  Alma Ag is a 42-year-old white female who presented to [facility] on 12/18/2021 with complaints of shortness of breath she has a past medical history lifetime non-smoker, CAD with stent placement in 2008, hypertension, diabetes mellitus, dyslipidemia. Per report she presented to [facility] on 12/18 with complaints of hypoxia and shortness of breath. She was initially placed on 4 L nasal cannula. 12/19 patient had persistent hypoxia and was transitioned to high flow nasal cannula. On 12/20 patient was transitioned to BiPAP and transferred to [facility] ICU for intubation. Past Medical History: Per HPI  Family History: No known family history  Social History: Lifetime non-smoker, denies alcohol or drug use. ROS   Sedated on mechanical ventilation     Scheduled Meds:   insulin glargine  45 Units SubCUTAneous Nightly    dexamethasone  6 mg IntraVENous Q24H    furosemide  40 mg IntraVENous Daily    cefTRIAXone (ROCEPHIN) IV  1,000 mg IntraVENous Q24H    insulin lispro  0-18 Units SubCUTAneous Q4H    baricitinib  4 mg Per NG tube Nightly    chlorhexidine  15 mL Mouth/Throat BID    famotidine (PEPCID) injection  20 mg IntraVENous BID    aspirin  81 mg Per NG tube Daily    enoxaparin  30 mg SubCUTAneous Q12H     Continuous Infusions:   fentaNYL 500 mcg in dextrose 5% 100 ml infusion 50 mcg/hr (12/26/21 0636)    DOPamine Stopped (12/25/21 0141)    sodium chloride      midazolam 6 mg/hr (12/26/21 0637)    dextrose         PHYSICAL EXAMINATION:  T:  98.8. P:  63. RR:  16. B/P:  107/73. FiO2:  60. O2 Sat:  92.  I/O:  1453/2250  Body mass index is 35.14 kg/m². PC: 16/10: TV: 557: RRTotal: 18: Ti:1 sec   General: Acute on chronically ill-appearing white female  HEENT:  normocephalic and atraumatic. No scleral icterus. PERR  Neck: supple. No Thyromegaly. Lungs: clear to auscultation. No retractions  Cardiac: RRR. No JVD. Abdomen: soft. Nontender. Extremities:  No clubbing, cyanosis, or edema x 4. Vasculature: capillary refill < 3 seconds. Palpable dorsalis pedis pulses. Skin:  warm and dry. Psych: Sedated on mechanical ventilation  Lymph:  No supraclavicular adenopathy. Neurologic:  No focal deficit. No seizures.     Data: Lab results reviewed     Meets Continued ICU Level Care Criteria:    [x] Yes   [] No - Transfer Planned to listed location:  [] HOSPITALIST CONTACTED- DR FRANCISCO time 35 min     Electronically signed by Sukhjinder Dow MD on 12/26/2021 at 4:44 PM

## 2021-12-27 LAB
GLUCOSE BLD-MCNC: 213 MG/DL (ref 70–108)
GLUCOSE BLD-MCNC: 217 MG/DL (ref 70–108)
GLUCOSE BLD-MCNC: 276 MG/DL (ref 70–108)
GLUCOSE BLD-MCNC: 281 MG/DL (ref 70–108)
GLUCOSE BLD-MCNC: 327 MG/DL (ref 70–108)
GLUCOSE BLD-MCNC: 329 MG/DL (ref 70–108)
GLUCOSE BLD-MCNC: 385 MG/DL (ref 70–108)

## 2021-12-27 PROCEDURE — 6370000000 HC RX 637 (ALT 250 FOR IP): Performed by: NURSE PRACTITIONER

## 2021-12-27 PROCEDURE — 6360000002 HC RX W HCPCS: Performed by: NURSE PRACTITIONER

## 2021-12-27 PROCEDURE — 99291 CRITICAL CARE FIRST HOUR: CPT | Performed by: INTERNAL MEDICINE

## 2021-12-27 PROCEDURE — 6360000002 HC RX W HCPCS: Performed by: INTERNAL MEDICINE

## 2021-12-27 PROCEDURE — 2580000003 HC RX 258: Performed by: NURSE PRACTITIONER

## 2021-12-27 PROCEDURE — 2100000000 HC CCU R&B

## 2021-12-27 PROCEDURE — 6360000002 HC RX W HCPCS: Performed by: PHYSICIAN ASSISTANT

## 2021-12-27 PROCEDURE — 94003 VENT MGMT INPAT SUBQ DAY: CPT

## 2021-12-27 PROCEDURE — 82948 REAGENT STRIP/BLOOD GLUCOSE: CPT

## 2021-12-27 PROCEDURE — 2500000003 HC RX 250 WO HCPCS: Performed by: NURSE PRACTITIONER

## 2021-12-27 RX ORDER — INSULIN GLARGINE 100 [IU]/ML
25 INJECTION, SOLUTION SUBCUTANEOUS 2 TIMES DAILY
Status: DISCONTINUED | OUTPATIENT
Start: 2021-12-27 | End: 2021-12-28

## 2021-12-27 RX ADMIN — ASPIRIN 81 MG CHEWABLE TABLET 81 MG: 81 TABLET CHEWABLE at 08:23

## 2021-12-27 RX ADMIN — INSULIN LISPRO 9 UNITS: 100 INJECTION, SOLUTION INTRAVENOUS; SUBCUTANEOUS at 16:36

## 2021-12-27 RX ADMIN — FAMOTIDINE 20 MG: 10 INJECTION, SOLUTION INTRAVENOUS at 08:23

## 2021-12-27 RX ADMIN — ENOXAPARIN SODIUM 30 MG: 100 INJECTION SUBCUTANEOUS at 01:07

## 2021-12-27 RX ADMIN — INSULIN LISPRO 6 UNITS: 100 INJECTION, SOLUTION INTRAVENOUS; SUBCUTANEOUS at 20:15

## 2021-12-27 RX ADMIN — INSULIN GLARGINE 25 UNITS: 100 INJECTION, SOLUTION SUBCUTANEOUS at 20:15

## 2021-12-27 RX ADMIN — INSULIN LISPRO 6 UNITS: 100 INJECTION, SOLUTION INTRAVENOUS; SUBCUTANEOUS at 23:37

## 2021-12-27 RX ADMIN — INSULIN LISPRO 12 UNITS: 100 INJECTION, SOLUTION INTRAVENOUS; SUBCUTANEOUS at 04:30

## 2021-12-27 RX ADMIN — CEFTRIAXONE SODIUM 1000 MG: 1 INJECTION, POWDER, FOR SOLUTION INTRAMUSCULAR; INTRAVENOUS at 14:55

## 2021-12-27 RX ADMIN — DEXAMETHASONE SODIUM PHOSPHATE 6 MG: 4 INJECTION, SOLUTION INTRA-ARTICULAR; INTRALESIONAL; INTRAMUSCULAR; INTRAVENOUS; SOFT TISSUE at 23:34

## 2021-12-27 RX ADMIN — FAMOTIDINE 20 MG: 10 INJECTION, SOLUTION INTRAVENOUS at 20:39

## 2021-12-27 RX ADMIN — CHLORHEXIDINE GLUCONATE 0.12% ORAL RINSE 15 ML: 1.2 LIQUID ORAL at 08:23

## 2021-12-27 RX ADMIN — ENOXAPARIN SODIUM 30 MG: 100 INJECTION SUBCUTANEOUS at 14:07

## 2021-12-27 RX ADMIN — INSULIN LISPRO 12 UNITS: 100 INJECTION, SOLUTION INTRAVENOUS; SUBCUTANEOUS at 09:02

## 2021-12-27 RX ADMIN — CHLORHEXIDINE GLUCONATE 0.12% ORAL RINSE 15 ML: 1.2 LIQUID ORAL at 20:39

## 2021-12-27 RX ADMIN — FUROSEMIDE 40 MG: 10 INJECTION, SOLUTION INTRAMUSCULAR; INTRAVENOUS at 08:23

## 2021-12-27 RX ADMIN — Medication 25 MCG/HR: at 03:33

## 2021-12-27 RX ADMIN — INSULIN LISPRO 9 UNITS: 100 INJECTION, SOLUTION INTRAVENOUS; SUBCUTANEOUS at 00:22

## 2021-12-27 RX ADMIN — BARICITINIB 4 MG: 2 TABLET, FILM COATED ORAL at 20:39

## 2021-12-27 RX ADMIN — INSULIN LISPRO 15 UNITS: 100 INJECTION, SOLUTION INTRAVENOUS; SUBCUTANEOUS at 11:40

## 2021-12-27 ASSESSMENT — PULMONARY FUNCTION TESTS
PIF_VALUE: 24

## 2021-12-27 NOTE — PROGRESS NOTES
Comprehensive Nutrition Assessment    Type and Reason for Visit:  Reassess (tube feeding)    Nutrition Recommendations/Plan:   Recommend continue tube feeding: Nepro Carb Steady at 30ml/ hour as goal  Flush one (2.5 ounce) Proteinex daily  Additional free water flush as per Doctor  Consider scheduled bowel meds    Nutrition Assessment:    Pt. Improving from nutritional standpoint AEB tube feeding at goal which meets 92% of estimated energy needs while NPO due to intubation. At risk for further nutrition compromise r/t +COVID 12/19/21 (12/12 onset of symptoms: loss of appetite, taste/ smell changes, nausea, diarrhea), acute respiratory failure, intubation 12/20/21, severe ARDS, pronation therapy, uncontrolled DM 2 with A1C (12/21) 9.9% and underlying medical condition (hx CAD, DM, obesity). Nutrition recommendations/interventions as per above. Malnutrition Assessment:  Malnutrition Status:  Insufficient data    Context:  Acute Illness     Findings of the 6 clinical characteristics of malnutrition:  Energy Intake:   (loss of appetite x 1 week PTA reported on admission)  Weight Loss:  Unable to assess (no weight records per EMR)     Body Fat Loss:  Unable to assess     Muscle Mass Loss:  Unable to assess    Fluid Accumulation:  Unable to assess     Strength:  Not Performed    Estimated Daily Nutrient Needs:  Energy (kcal):  8910-1864 kcals (30-32);  Weight Used for Energy Requirements:  Ideal (50kgm)     Protein (g):  100 grams or greater (2); Weight Used for Protein Requirements:  Ideal (50kgm)        Fluid (ml/day):  as per Doctor; Method Used for Fluid Requirements:  Other (Comment)      Nutrition Related Findings:    Patient intubated 12/20, not proned today per RN and reports tolerating tube feeding at goal, had soft BM earlier today, note elevated glucose and receives Humalog with high dose corrective algorithm, glucose 327, 329; (12/25) BUN 44, Creatinine 0.8, Potassium 4.3; medication includes rocephin, lasix, lantus, humalog, fentanyl, versed, prn dulcolax, prn glycolax, prn senokot     Wounds:  None       Current Nutrition Therapies:    Diet NPO  ADULT TUBE FEEDING; Orogastric; Renal Formula; Continuous; 10; Yes; 10; Q 12 hours; 30; 30;  Other (specify); [id number]; Protein; one (2.5 ounce) Proteinex daily  Current Tube Feeding (TF) Orders:  · Feeding Route: Orogastric  · Formula: Renal Formula (Nepro)  · Schedule: Continuous (begin at 10ml/ hour, goal 30ml/ hour)  · Additives/Modulars: Protein (one Proteinex daily)  · Water Flushes: as per Doctor  · Goal TF & Flush Orders Provides:   7332 kcals (9230 TF, 104 modular), 84 grams protein (58 TF, 26 modular), 115 grams CHO, 18 grams fiber, 523ml water in 795ml (720 TF, 75 modular)/ 24 hours       Anthropometric Measures:  · Height: 5' 2\" (157.5 cm)  · Current Body Weight: 191 lb 12.8 oz (87 kg) (12/27; +1 edema)   · Admission Body Weight: 206 lb 4.8 oz (93.6 kg) (12/18; no edema)    · Usual Body Weight:  (no weight records per EMR)     · Ideal Body Weight: 110 lbs;   · BMI: 35.1  · BMI Categories: Obese Class 2 (BMI 35.0 -39.9)       Nutrition Diagnosis:   · Inadequate oral intake related to impaired respiratory function as evidenced by NPO or clear liquid status due to medical condition,intubation,nutrition support - enteral nutrition      Nutrition Interventions:   Food and/or Nutrient Delivery:  Continue Current Tube Feeding  Nutrition Education/Counseling:  No recommendation at this time   Coordination of Nutrition Care:  Continue to monitor while inpatient    Goals:  EN to provide % of nutrient needs for COVID recovery while intubated       Nutrition Monitoring and Evaluation:   Behavioral-Environmental Outcomes:  None Identified   Food/Nutrient Intake Outcomes:  Enteral Nutrition Intake/Tolerance  Physical Signs/Symptoms Outcomes:  Biochemical Data,Fluid Status or Edema,Hemodynamic Status,Nutrition Focused Physical Findings,Skin,Weight,GI Status Discharge Planning:     Too soon to determine     Electronically signed by Saralyn Essex, RD, LD on 12/27/21 at 3:07 PM EST    Contact: [phone]

## 2021-12-27 NOTE — PROGRESS NOTES
Call placed to [facility]. Updated on changes throughout the night. All questions answered at this time.

## 2021-12-27 NOTE — CARE COORDINATION
[address] Dr Benedicto [facility] day: 9     Procedure:   12/20 intubated  12/24 CXR:Diffuse scattered infiltrates and airspace disease throughout the   bilateral lungs. More complex consolidation involving the right lung base    12/25 CXR:  reports bilateral consolidation    Barriers to Discharge: remains on vent: AC/PC mode, FiO2 50%, rate 16, PEEP 8, sats 94%. SBT. Afebrile. Begin tube feedings/dietitian follows. WBC 12, Rocephin day #3, Decadron, Barcitinib,  Diabetic management. Cardiology consulted for cardiomyopathy.     Patient Goals/Plan/Treatment Preferences: from home with spouse; will follow clinical course for needs.

## 2021-12-27 NOTE — PROGRESS NOTES
CRITICAL CARE PROGRESS NOTE      Patient:  Thalia Hill    Unit/Bed:4B-07/007-A  YOB: 1950  MRN: [id number]   PCP: Ambar Almeida  Date of Admission: 12/18/2021  Chief Complaint:- Acute hypoxic respiratory failure     Assessment and Plan:      1. Acute hypoxic respiratory failure: Patient initially presented on nasal cannula escalated to high flow and BiPAP.  She was intubated on 12/20/2021.  Maintain lung protective strategies with peak pressure less than 35. Not ready to wean. 2. COVID-19: Onset of symptoms 12/12/2021.  Positive on 12/17, continue Decadron and baricitinib. 3. Severe ARDS: on pronation therapy will continue. 4. Bacterial pneumonia: Respiratory culture positive for Klebsiella oxytocin, sensitivity to Rocephin. Initiate Rocephin day #3  5. Mild hyperkalemia: Resolved mild, monitor patient on insulin drip for uncontrolled diabetes. 6. Diabetes mellitus type 2 uncontrolled: s/p insulin drip.  A1c noted at 9.9.  On sliding scale insulin and Humalog, lantus done adjusted to 25 units twice daily  7. Cardiomyopathy: Ejection fraction noted from 30 to 35%.  No noted echo at [facility] are in care everywhere. Johnathan Reid does have history of stent placement in 2008. Vero Pina if this is new onset cardiomyopathy or pre-existing.  Patient is noted be bradycardic requiring dopamine.  Cardiology consulted. On oral lasix   8. Hypertension: Listed in history, currently normotensive. Monitor   9. Bradycardia: Resolved;  requiring dopamine.  Cardiology consulted for cardiomyopathy.   10. Leukocytosis: WBC 12.2, no fever.  No signs of secondary infection. 11. Obesity: BMI 35.08  12.  CAD: history of stent placement, 2008.      INITIAL H AND P AND ICU COURSE:  Kaleigh Sierra is a 53-year-old white female who presented to [facility] on 12/18/2021 with complaints of shortness of breath she has a past medical history lifetime non-smoker, CAD with stent placement in 2008, hypertension, diabetes mellitus, dyslipidemia.  Per report she presented to [facility] on 12/18 with complaints of hypoxia and shortness of breath.  She was initially placed on 4 L nasal cannula.  12/19 patient had persistent hypoxia and was transitioned to high flow nasal cannula.  On 12/20 patient was transitioned to BiPAP and transferred to [facility] ICU for intubation.     Past Medical History: Per HPI  Family History: No known family history  Social History: Lifetime non-smoker, denies alcohol or drug use.     ROS   Sedated on mechanical ventilation    Scheduled Meds:   insulin glargine  25 Units SubCUTAneous BID    dexamethasone  6 mg IntraVENous Q24H    furosemide  40 mg IntraVENous Daily    cefTRIAXone (ROCEPHIN) IV  1,000 mg IntraVENous Q24H    insulin lispro  0-18 Units SubCUTAneous Q4H    baricitinib  4 mg Per NG tube Nightly    chlorhexidine  15 mL Mouth/Throat BID    famotidine (PEPCID) injection  20 mg IntraVENous BID    aspirin  81 mg Per NG tube Daily    enoxaparin  30 mg SubCUTAneous Q12H     Continuous Infusions:   fentaNYL 500 mcg in dextrose 5% 100 ml infusion 25 mcg/hr (12/27/21 0333)    sodium chloride      midazolam 2 mg/hr (12/27/21 0716)    dextrose         PHYSICAL EXAMINATION:  T:  98.5. P:  56. RR:  16. B/P:  121/71. FiO2:  50. O2 Sat:  95.  I/O:  3034/1525  Body mass index is 35.08 kg/m². PC: 16/8: TV: 431: RRTotal: 16: Ti:1 sec  General: Acute on chronically ill-appearing white female  HEENT:  normocephalic and atraumatic. No scleral icterus. PERR  Neck: supple. No Thyromegaly. Lungs: clear to auscultation. No retractions  Cardiac: RRR. No JVD. Abdomen: soft. Nontender. Extremities:  No clubbing, cyanosis, or edema x 4. Vasculature: capillary refill < 3 seconds. Palpable dorsalis pedis pulses. Skin:  warm and dry. Psych: Sedated on mechanical ventilation  Lymph:  No supraclavicular adenopathy. Neurologic:  No focal deficit. No seizures.     Data: (All radiographs, tracings, PFTs, and imaging are personally viewed and interpreted unless otherwise noted).  Sodium 140, potassium 4.3, chloride 102, CO2 28, BUN 44, creatinine 0.8, anion gap 10.0, glucose 281   WBC 12.2, hemoglobin 12.8, hematocrit 39.4, platelet count 541   Telemetry shows NSR    Chest x-ray 12/25/2021 reports bilateral consolidation      Meets Continued ICU Level Care Criteria:    [x] Yes   [] No - Transfer Planned to listed location:  [] HOSPITALIST CONTACTED-      Case and plan discussed with Dr. Herbert Greek        Electronically signed by Angelica Brice.  WYATT Wheatley - CNP  CRITICAL CARE SPECIALIST

## 2021-12-27 NOTE — SIGNIFICANT EVENT
Updated  at the bedside. All questions were answered. Electronically signed by Orlando Garcia CNP on 12/27/2021 at 2:53 PM

## 2021-12-28 ENCOUNTER — APPOINTMENT (OUTPATIENT)
Dept: GENERAL RADIOLOGY | Age: 71
DRG: 207 | End: 2021-12-28
Payer: MEDICARE

## 2021-12-28 LAB
ANION GAP SERPL CALCULATED.3IONS-SCNC: 8 MEQ/L (ref 8–16)
BASOPHILS # BLD: 0.6 %
BASOPHILS ABSOLUTE: 0.1 THOU/MM3 (ref 0–0.1)
BUN BLDV-MCNC: 41 MG/DL (ref 7–22)
CALCIUM SERPL-MCNC: 9.1 MG/DL (ref 8.5–10.5)
CHLORIDE BLD-SCNC: 97 MEQ/L (ref 98–111)
CO2: 30 MEQ/L (ref 23–33)
CREAT SERPL-MCNC: 0.7 MG/DL (ref 0.4–1.2)
EOSINOPHIL # BLD: 0.2 %
EOSINOPHILS ABSOLUTE: 0 THOU/MM3 (ref 0–0.4)
ERYTHROCYTE [DISTWIDTH] IN BLOOD BY AUTOMATED COUNT: 11.8 % (ref 11.5–14.5)
ERYTHROCYTE [DISTWIDTH] IN BLOOD BY AUTOMATED COUNT: 39 FL (ref 35–45)
GFR SERPL CREATININE-BSD FRML MDRD: 83 ML/MIN/1.73M2
GLUCOSE BLD-MCNC: 244 MG/DL (ref 70–108)
GLUCOSE BLD-MCNC: 268 MG/DL (ref 70–108)
GLUCOSE BLD-MCNC: 272 MG/DL (ref 70–108)
GLUCOSE BLD-MCNC: 273 MG/DL (ref 70–108)
GLUCOSE BLD-MCNC: 282 MG/DL (ref 70–108)
GLUCOSE BLD-MCNC: 300 MG/DL (ref 70–108)
GLUCOSE BLD-MCNC: 302 MG/DL (ref 70–108)
HCT VFR BLD CALC: 37.3 % (ref 37–47)
HEMOGLOBIN: 12 GM/DL (ref 12–16)
IMMATURE GRANS (ABS): 0.81 THOU/MM3 (ref 0–0.07)
IMMATURE GRANULOCYTES: 6.8 %
LYMPHOCYTES # BLD: 5.9 %
LYMPHOCYTES ABSOLUTE: 0.7 THOU/MM3 (ref 1–4.8)
MCH RBC QN AUTO: 29.1 PG (ref 26–33)
MCHC RBC AUTO-ENTMCNC: 32.2 GM/DL (ref 32.2–35.5)
MCV RBC AUTO: 90.5 FL (ref 81–99)
MONOCYTES # BLD: 4.4 %
MONOCYTES ABSOLUTE: 0.5 THOU/MM3 (ref 0.4–1.3)
NUCLEATED RED BLOOD CELLS: 0 /100 WBC
PLATELET # BLD: 224 THOU/MM3 (ref 130–400)
PMV BLD AUTO: 11 FL (ref 9.4–12.4)
POTASSIUM SERPL-SCNC: 4.9 MEQ/L (ref 3.5–5.2)
RBC # BLD: 4.12 MILL/MM3 (ref 4.2–5.4)
SEG NEUTROPHILS: 82.1 %
SEGMENTED NEUTROPHILS ABSOLUTE COUNT: 9.9 THOU/MM3 (ref 1.8–7.7)
SODIUM BLD-SCNC: 135 MEQ/L (ref 135–145)
WBC # BLD: 12 THOU/MM3 (ref 4.8–10.8)

## 2021-12-28 PROCEDURE — 6360000002 HC RX W HCPCS: Performed by: PHYSICIAN ASSISTANT

## 2021-12-28 PROCEDURE — 71045 X-RAY EXAM CHEST 1 VIEW: CPT

## 2021-12-28 PROCEDURE — 6370000000 HC RX 637 (ALT 250 FOR IP): Performed by: NURSE PRACTITIONER

## 2021-12-28 PROCEDURE — 2500000003 HC RX 250 WO HCPCS: Performed by: NURSE PRACTITIONER

## 2021-12-28 PROCEDURE — 85025 COMPLETE CBC W/AUTO DIFF WBC: CPT

## 2021-12-28 PROCEDURE — 99233 SBSQ HOSP IP/OBS HIGH 50: CPT | Performed by: INTERNAL MEDICINE

## 2021-12-28 PROCEDURE — 80048 BASIC METABOLIC PNL TOTAL CA: CPT

## 2021-12-28 PROCEDURE — 94003 VENT MGMT INPAT SUBQ DAY: CPT

## 2021-12-28 PROCEDURE — 36415 COLL VENOUS BLD VENIPUNCTURE: CPT

## 2021-12-28 PROCEDURE — 6360000002 HC RX W HCPCS: Performed by: NURSE PRACTITIONER

## 2021-12-28 PROCEDURE — 2580000003 HC RX 258: Performed by: NURSE PRACTITIONER

## 2021-12-28 PROCEDURE — 2500000003 HC RX 250 WO HCPCS: Performed by: INTERNAL MEDICINE

## 2021-12-28 PROCEDURE — 82948 REAGENT STRIP/BLOOD GLUCOSE: CPT

## 2021-12-28 PROCEDURE — 2100000000 HC CCU R&B

## 2021-12-28 RX ORDER — DEXMEDETOMIDINE HYDROCHLORIDE 4 UG/ML
.2-1.4 INJECTION, SOLUTION INTRAVENOUS CONTINUOUS
Status: DISCONTINUED | OUTPATIENT
Start: 2021-12-28 | End: 2022-01-02

## 2021-12-28 RX ORDER — INSULIN GLARGINE 100 [IU]/ML
30 INJECTION, SOLUTION SUBCUTANEOUS 2 TIMES DAILY
Status: DISCONTINUED | OUTPATIENT
Start: 2021-12-28 | End: 2021-12-29

## 2021-12-28 RX ADMIN — INSULIN LISPRO 9 UNITS: 100 INJECTION, SOLUTION INTRAVENOUS; SUBCUTANEOUS at 16:49

## 2021-12-28 RX ADMIN — CHLORHEXIDINE GLUCONATE 0.12% ORAL RINSE 15 ML: 1.2 LIQUID ORAL at 20:39

## 2021-12-28 RX ADMIN — ENOXAPARIN SODIUM 30 MG: 100 INJECTION SUBCUTANEOUS at 01:09

## 2021-12-28 RX ADMIN — INSULIN LISPRO 9 UNITS: 100 INJECTION, SOLUTION INTRAVENOUS; SUBCUTANEOUS at 08:26

## 2021-12-28 RX ADMIN — FUROSEMIDE 40 MG: 10 INJECTION, SOLUTION INTRAMUSCULAR; INTRAVENOUS at 08:16

## 2021-12-28 RX ADMIN — INSULIN LISPRO 9 UNITS: 100 INJECTION, SOLUTION INTRAVENOUS; SUBCUTANEOUS at 20:37

## 2021-12-28 RX ADMIN — INSULIN LISPRO 6 UNITS: 100 INJECTION, SOLUTION INTRAVENOUS; SUBCUTANEOUS at 23:52

## 2021-12-28 RX ADMIN — INSULIN LISPRO 12 UNITS: 100 INJECTION, SOLUTION INTRAVENOUS; SUBCUTANEOUS at 11:37

## 2021-12-28 RX ADMIN — FAMOTIDINE 20 MG: 10 INJECTION, SOLUTION INTRAVENOUS at 20:36

## 2021-12-28 RX ADMIN — ASPIRIN 81 MG CHEWABLE TABLET 81 MG: 81 TABLET CHEWABLE at 08:16

## 2021-12-28 RX ADMIN — CHLORHEXIDINE GLUCONATE 0.12% ORAL RINSE 15 ML: 1.2 LIQUID ORAL at 08:16

## 2021-12-28 RX ADMIN — BARICITINIB 4 MG: 2 TABLET, FILM COATED ORAL at 20:37

## 2021-12-28 RX ADMIN — Medication 12.5 MCG/HR: at 06:15

## 2021-12-28 RX ADMIN — DEXMEDETOMIDINE HYDROCHLORIDE 0.2 MCG/KG/HR: 4 INJECTION, SOLUTION INTRAVENOUS at 08:14

## 2021-12-28 RX ADMIN — ENOXAPARIN SODIUM 30 MG: 100 INJECTION SUBCUTANEOUS at 13:20

## 2021-12-28 RX ADMIN — INSULIN GLARGINE 30 UNITS: 100 INJECTION, SOLUTION SUBCUTANEOUS at 21:56

## 2021-12-28 RX ADMIN — CEFTRIAXONE SODIUM 1000 MG: 1 INJECTION, POWDER, FOR SOLUTION INTRAMUSCULAR; INTRAVENOUS at 15:10

## 2021-12-28 RX ADMIN — INSULIN LISPRO 9 UNITS: 100 INJECTION, SOLUTION INTRAVENOUS; SUBCUTANEOUS at 04:28

## 2021-12-28 RX ADMIN — INSULIN GLARGINE 25 UNITS: 100 INJECTION, SOLUTION SUBCUTANEOUS at 08:16

## 2021-12-28 RX ADMIN — FAMOTIDINE 20 MG: 10 INJECTION, SOLUTION INTRAVENOUS at 08:16

## 2021-12-28 ASSESSMENT — PULMONARY FUNCTION TESTS
PIF_VALUE: 24
PIF_VALUE: 23
PIF_VALUE: 24
PIF_VALUE: 24

## 2021-12-28 NOTE — PROGRESS NOTES
CRITICAL CARE PROGRESS NOTE      Patient:  [facility]    Unit/Bed:4B-07/007-A  YOB: 1950  MRN: [id number]   PCP: Lafe Brittle  Date of Admission: 12/18/2021  Chief Complaint:- Acute hypoxic respiratory failure    Assessment and Plan:       1. Acute hypoxic respiratory failure: Patient initially presented on nasal cannula escalated to high flow and BiPAP.  She was intubated on 12/20/2021.  Maintain lung protective strategies with peak pressure less than 35. Continue to wean as tolerates  2. XBSIY-12: YRCWT of symptoms 12/12/2021.  Positive on 12/17, continue Decadron and baricitinib. 3. Severe ARDS: on pronation therapy will continue. 4. Bacterial pneumonia: Respiratory culture positive for Klebsiella oxytocin, sensitivity to Rocephin.  Initiate Rocephin day #3  5. Mild hyperkalemia: Resolved; mild, monitor patient on insulin drip for uncontrolled diabetes. 6. Diabetes mellitus type 2 uncontrolled: s/p insulin drip.  A1c noted at 9.9.  On sliding scale insulin and Humalog, lantus done adjusted to 30 units twice daily  7. Cardiomyopathy: Ejection fraction noted from 30 to 35%.  No noted echo at [facility] are in care everywhere. Cesilia Carney does have history of stent placement in 2008. Bess Beltran if this is new onset cardiomyopathy or pre-existing.  Patient is noted be bradycardic requiring dopamine.  Cardiology consulted.  On oral lasix   8. Hypertension: Listed in history, currently normotensive. Monitor   9. Bradycardia: Resolved;  requiring dopamine.  Cardiology consulted for cardiomyopathy.   10. Leukocytosis: WBC 12.0, no fever. Continue ATB therapy  11. Obesity: BMI 35.08  12.  CAD: history of stent placement, 2008.      INITIAL H AND P AND ICU COURSE:  Kaleigh Sierra is a 72-year-old white female who presented to [facility] on 12/18/2021 with complaints of shortness of breath she has a past medical history lifetime non-smoker, CAD with stent placement in 2008, hypertension, diabetes mellitus, dyslipidemia.  Per report she presented to [facility] on 12/18 with complaints of hypoxia and shortness of breath.  She was initially placed on 4 L nasal cannula.  12/19 patient had persistent hypoxia and was transitioned to high flow nasal cannula.  On 12/20 patient was transitioned to BiPAP and transferred to [facility] for intubation.     Past Medical History: Per HPI  Family History: No known family history  Social History: Lifetime non-smoker, denies alcohol or drug use.     ROS   Sedated on mechanical ventilation     Scheduled Meds:   insulin glargine  25 Units SubCUTAneous BID    dexamethasone  6 mg IntraVENous Q24H    furosemide  40 mg IntraVENous Daily    cefTRIAXone (ROCEPHIN) IV  1,000 mg IntraVENous Q24H    insulin lispro  0-18 Units SubCUTAneous Q4H    baricitinib  4 mg Per NG tube Nightly    chlorhexidine  15 mL Mouth/Throat BID    famotidine (PEPCID) injection  20 mg IntraVENous BID    aspirin  81 mg Per NG tube Daily    enoxaparin  30 mg SubCUTAneous Q12H     Continuous Infusions:   fentaNYL 500 mcg in dextrose 5% 100 ml infusion 25 mcg/hr (12/27/21 0333)    sodium chloride      midazolam Stopped (12/27/21 1642)    dextrose         PHYSICAL EXAMINATION:  T:  98.4.  P:  68. RR:  18. B/P:  122/72. FiO2:  50. O2 Sat:  94.  I/O:  1266/1650  Body mass index is 35.08 kg/m². PC: 16/8: TV: 420: RRTotal: 16: Ti:1 sec  General: Acute on chronically ill-appearing white female  HEENT:  normocephalic and atraumatic. No scleral icterus. PERR  Neck: supple. No Thyromegaly. Lungs: clear to auscultation. No retractions  Cardiac: RRR. No JVD. Abdomen: soft. Nontender. Extremities:  No clubbing, cyanosis, or edema x 4. Vasculature: capillary refill < 3 seconds. Palpable dorsalis pedis pulses. Skin:  warm and dry. Psych: Sedated on mechanical ventilation  Lymph:  No supraclavicular adenopathy. Neurologic:  No focal deficit. No seizures.     Data: (All radiographs, tracings, PFTs, and imaging are personally viewed and interpreted unless otherwise noted).  Sodium 135, potassium 4.9, chloride 97, CO2 30, BUN 41, creatinine 0.7, anion gap 8.0, glucose 302.  WBC 12.0, hemoglobin 12.0, hematocrit 37.3, platelet count 903   Telemetry shows normal sinus rhythm   Chest x-ray 12/28/2021 stable   Respiratory panel positive for Klebsiella        Meets Continued ICU Level Care Criteria:    [x] Yes   [] No - Transfer Planned to listed location:  [] HOSPITALIST CONTACTED-      Case and plan discussed with Dr. Uday Juarez. Electronically signed by Mary Gonzalez.  WYATT Alvarez - CNP  CRITICAL CARE SPECIALIST

## 2021-12-29 LAB
ANION GAP SERPL CALCULATED.3IONS-SCNC: 13 MEQ/L (ref 8–16)
BASOPHILS # BLD: 0.4 %
BASOPHILS ABSOLUTE: 0.1 THOU/MM3 (ref 0–0.1)
BUN BLDV-MCNC: 41 MG/DL (ref 7–22)
CALCIUM SERPL-MCNC: 9.3 MG/DL (ref 8.5–10.5)
CHLORIDE BLD-SCNC: 96 MEQ/L (ref 98–111)
CO2: 27 MEQ/L (ref 23–33)
CREAT SERPL-MCNC: 0.6 MG/DL (ref 0.4–1.2)
EOSINOPHIL # BLD: 0 %
EOSINOPHILS ABSOLUTE: 0 THOU/MM3 (ref 0–0.4)
ERYTHROCYTE [DISTWIDTH] IN BLOOD BY AUTOMATED COUNT: 11.7 % (ref 11.5–14.5)
ERYTHROCYTE [DISTWIDTH] IN BLOOD BY AUTOMATED COUNT: 37.8 FL (ref 35–45)
GFR SERPL CREATININE-BSD FRML MDRD: > 90 ML/MIN/1.73M2
GLUCOSE BLD-MCNC: 176 MG/DL (ref 70–108)
GLUCOSE BLD-MCNC: 260 MG/DL (ref 70–108)
GLUCOSE BLD-MCNC: 261 MG/DL (ref 70–108)
GLUCOSE BLD-MCNC: 270 MG/DL (ref 70–108)
GLUCOSE BLD-MCNC: 302 MG/DL (ref 70–108)
GLUCOSE BLD-MCNC: 323 MG/DL (ref 70–108)
GLUCOSE BLD-MCNC: 343 MG/DL (ref 70–108)
HCT VFR BLD CALC: 39.4 % (ref 37–47)
HEMOGLOBIN: 13.3 GM/DL (ref 12–16)
IMMATURE GRANS (ABS): 0.84 THOU/MM3 (ref 0–0.07)
IMMATURE GRANULOCYTES: 4.5 %
LYMPHOCYTES # BLD: 4.6 %
LYMPHOCYTES ABSOLUTE: 0.9 THOU/MM3 (ref 1–4.8)
MCH RBC QN AUTO: 30.1 PG (ref 26–33)
MCHC RBC AUTO-ENTMCNC: 33.8 GM/DL (ref 32.2–35.5)
MCV RBC AUTO: 89.1 FL (ref 81–99)
MONOCYTES # BLD: 5.6 %
MONOCYTES ABSOLUTE: 1 THOU/MM3 (ref 0.4–1.3)
NUCLEATED RED BLOOD CELLS: 0 /100 WBC
PLATELET # BLD: 241 THOU/MM3 (ref 130–400)
PMV BLD AUTO: 10.9 FL (ref 9.4–12.4)
POTASSIUM SERPL-SCNC: 5.1 MEQ/L (ref 3.5–5.2)
RBC # BLD: 4.42 MILL/MM3 (ref 4.2–5.4)
SEG NEUTROPHILS: 84.9 %
SEGMENTED NEUTROPHILS ABSOLUTE COUNT: 15.7 THOU/MM3 (ref 1.8–7.7)
SODIUM BLD-SCNC: 136 MEQ/L (ref 135–145)
WBC # BLD: 18.5 THOU/MM3 (ref 4.8–10.8)

## 2021-12-29 PROCEDURE — 2580000003 HC RX 258: Performed by: NURSE PRACTITIONER

## 2021-12-29 PROCEDURE — 6370000000 HC RX 637 (ALT 250 FOR IP): Performed by: NURSE PRACTITIONER

## 2021-12-29 PROCEDURE — 2500000003 HC RX 250 WO HCPCS: Performed by: NURSE PRACTITIONER

## 2021-12-29 PROCEDURE — 94003 VENT MGMT INPAT SUBQ DAY: CPT

## 2021-12-29 PROCEDURE — 2100000000 HC CCU R&B

## 2021-12-29 PROCEDURE — 6360000002 HC RX W HCPCS: Performed by: PHYSICIAN ASSISTANT

## 2021-12-29 PROCEDURE — 2500000003 HC RX 250 WO HCPCS: Performed by: INTERNAL MEDICINE

## 2021-12-29 PROCEDURE — 36415 COLL VENOUS BLD VENIPUNCTURE: CPT

## 2021-12-29 PROCEDURE — 6370000000 HC RX 637 (ALT 250 FOR IP): Performed by: PHYSICIAN ASSISTANT

## 2021-12-29 PROCEDURE — 80048 BASIC METABOLIC PNL TOTAL CA: CPT

## 2021-12-29 PROCEDURE — 6360000002 HC RX W HCPCS: Performed by: NURSE PRACTITIONER

## 2021-12-29 PROCEDURE — 82948 REAGENT STRIP/BLOOD GLUCOSE: CPT

## 2021-12-29 PROCEDURE — 6360000002 HC RX W HCPCS: Performed by: INTERNAL MEDICINE

## 2021-12-29 PROCEDURE — 85025 COMPLETE CBC W/AUTO DIFF WBC: CPT

## 2021-12-29 RX ORDER — INSULIN GLARGINE 100 [IU]/ML
35 INJECTION, SOLUTION SUBCUTANEOUS 2 TIMES DAILY
Status: DISCONTINUED | OUTPATIENT
Start: 2021-12-29 | End: 2021-12-31

## 2021-12-29 RX ADMIN — DEXAMETHASONE SODIUM PHOSPHATE 6 MG: 4 INJECTION, SOLUTION INTRA-ARTICULAR; INTRALESIONAL; INTRAMUSCULAR; INTRAVENOUS; SOFT TISSUE at 00:07

## 2021-12-29 RX ADMIN — INSULIN GLARGINE 35 UNITS: 100 INJECTION, SOLUTION SUBCUTANEOUS at 20:12

## 2021-12-29 RX ADMIN — INSULIN GLARGINE 30 UNITS: 100 INJECTION, SOLUTION SUBCUTANEOUS at 09:04

## 2021-12-29 RX ADMIN — ASPIRIN 81 MG CHEWABLE TABLET 81 MG: 81 TABLET CHEWABLE at 09:04

## 2021-12-29 RX ADMIN — ENOXAPARIN SODIUM 30 MG: 100 INJECTION SUBCUTANEOUS at 13:11

## 2021-12-29 RX ADMIN — DEXMEDETOMIDINE HYDROCHLORIDE 0.5 MCG/KG/HR: 4 INJECTION, SOLUTION INTRAVENOUS at 01:11

## 2021-12-29 RX ADMIN — INSULIN LISPRO 12 UNITS: 100 INJECTION, SOLUTION INTRAVENOUS; SUBCUTANEOUS at 08:53

## 2021-12-29 RX ADMIN — INSULIN LISPRO 9 UNITS: 100 INJECTION, SOLUTION INTRAVENOUS; SUBCUTANEOUS at 16:21

## 2021-12-29 RX ADMIN — FUROSEMIDE 40 MG: 10 INJECTION, SOLUTION INTRAMUSCULAR; INTRAVENOUS at 08:58

## 2021-12-29 RX ADMIN — FENTANYL CITRATE 25 MCG: 50 INJECTION INTRAMUSCULAR; INTRAVENOUS at 00:00

## 2021-12-29 RX ADMIN — ENOXAPARIN SODIUM 30 MG: 100 INJECTION SUBCUTANEOUS at 01:18

## 2021-12-29 RX ADMIN — CHLORHEXIDINE GLUCONATE 0.12% ORAL RINSE 15 ML: 1.2 LIQUID ORAL at 20:14

## 2021-12-29 RX ADMIN — INSULIN LISPRO 9 UNITS: 100 INJECTION, SOLUTION INTRAVENOUS; SUBCUTANEOUS at 03:46

## 2021-12-29 RX ADMIN — FAMOTIDINE 20 MG: 10 INJECTION, SOLUTION INTRAVENOUS at 08:58

## 2021-12-29 RX ADMIN — INSULIN LISPRO 12 UNITS: 100 INJECTION, SOLUTION INTRAVENOUS; SUBCUTANEOUS at 13:11

## 2021-12-29 RX ADMIN — FAMOTIDINE 20 MG: 10 INJECTION, SOLUTION INTRAVENOUS at 20:14

## 2021-12-29 RX ADMIN — BARICITINIB 4 MG: 2 TABLET, FILM COATED ORAL at 20:14

## 2021-12-29 RX ADMIN — POLYETHYLENE GLYCOL 3350 17 G: 17 POWDER, FOR SOLUTION ORAL at 08:58

## 2021-12-29 RX ADMIN — CEFTRIAXONE SODIUM 1000 MG: 1 INJECTION, POWDER, FOR SOLUTION INTRAMUSCULAR; INTRAVENOUS at 14:43

## 2021-12-29 RX ADMIN — INSULIN LISPRO 9 UNITS: 100 INJECTION, SOLUTION INTRAVENOUS; SUBCUTANEOUS at 20:10

## 2021-12-29 RX ADMIN — CHLORHEXIDINE GLUCONATE 0.12% ORAL RINSE 15 ML: 1.2 LIQUID ORAL at 08:58

## 2021-12-29 ASSESSMENT — PULMONARY FUNCTION TESTS
PIF_VALUE: 19
PIF_VALUE: 20
PIF_VALUE: 19
PIF_VALUE: 22
PIF_VALUE: 24.1
PIF_VALUE: 22

## 2021-12-29 ASSESSMENT — PAIN SCALES - GENERAL: PAINLEVEL_OUTOF10: 0

## 2021-12-29 NOTE — CARE COORDINATION
Discharge planning update:    Remains intubated on ventilator. FiO2 40%, 50 liters oxygen, PEEP 8 with saturations at 94%. Tmax 100. Precedex drips. TF via OG tube, Dietician following. Decadron, baricitinib, diabetes management]. From home with spouse. Will follow for potential needs.    Electronically signed by Ramana Vicente RN on 12/29/2021 at 12:09 PM

## 2021-12-29 NOTE — PROGRESS NOTES
diabetes mellitus, dyslipidemia.  Per report she presented to [facility] on 12/18 with complaints of hypoxia and shortness of breath.  She was initially placed on 4 L nasal cannula.  12/19 patient had persistent hypoxia and was transitioned to high flow nasal cannula.  On 12/20 patient was transitioned to BiPAP and transferred to [place] ICU for intubation.     Past Medical History: Per HPI  Family History: No known family history  Social History: Lifetime non-smoker, denies alcohol or drug use.     ROS   Sedated on mechanical ventilation    Scheduled Meds:   insulin glargine  30 Units SubCUTAneous BID    dexamethasone  6 mg IntraVENous Q24H    furosemide  40 mg IntraVENous Daily    cefTRIAXone (ROCEPHIN) IV  1,000 mg IntraVENous Q24H    insulin lispro  0-18 Units SubCUTAneous Q4H    baricitinib  4 mg Per NG tube Nightly    chlorhexidine  15 mL Mouth/Throat BID    famotidine (PEPCID) injection  20 mg IntraVENous BID    aspirin  81 mg Per NG tube Daily    enoxaparin  30 mg SubCUTAneous Q12H     Continuous Infusions:   dexmedetomidine 0.04 mcg/kg/hr (12/29/21 0946)    fentaNYL 500 mcg in dextrose 5% 100 ml infusion 12.5 mcg/hr (12/28/21 0615)    sodium chloride      midazolam Stopped (12/27/21 1641)    dextrose         PHYSICAL EXAMINATION:  T:  99.  P:  81. RR:  26. B/P:  120/82. FiO2:  40. O2 Sat:  94.  I/O:  1708/2000  Body mass index is 35.52 kg/m². PC: 14/8: TV: 391: RRTotal: 24: Ti:1 sec  General:   Acute on chronically ill-appearing white female  HEENT:  normocephalic and atraumatic. No scleral icterus. PERR  Neck: supple. No Thyromegaly. Lungs: clear to auscultation. No retractions  Cardiac: RRR. No JVD. Abdomen: soft. Nontender. Extremities:  No clubbing, cyanosis, or edema x 4. Vasculature: capillary refill < 3 seconds. Palpable dorsalis pedis pulses. Skin:  warm and dry. Psych: Sedated on mechanical ventilation, opens eyes to verbal stimulation.   Lymph:  No supraclavicular adenopathy. Neurologic:  No focal deficit. No seizures. Data: (All radiographs, tracings, PFTs, and imaging are personally viewed and interpreted unless otherwise noted).  Sodium 136, potassium 5.1, chloride 96, CO2 27, BUN 41, creatinine 0.6, anion gap 13.0, glucose 302.  WBC 18.5, hemoglobin 13.3, hematocrit 39.4, platelet count 948   Telemetry shows normal sinus rhythm   Chest x-ray 12/28/2021 shows peripheral densities. Meets Continued ICU Level Care Criteria:    [x] Yes   [] No - Transfer Planned to listed location:  [] HOSPITALIST CONTACTED-      Case and plan discussed with Dr. Alicia Babinski        Electronically signed by WYATT Hall - [facility]  CRITICAL CARE SPECIALIST

## 2021-12-30 ENCOUNTER — APPOINTMENT (OUTPATIENT)
Dept: INTERVENTIONAL RADIOLOGY/VASCULAR | Age: 71
DRG: 207 | End: 2021-12-30
Payer: MEDICARE

## 2021-12-30 LAB
ANION GAP SERPL CALCULATED.3IONS-SCNC: 13 MEQ/L (ref 8–16)
BASOPHILS # BLD: 0.4 %
BASOPHILS ABSOLUTE: 0.1 THOU/MM3 (ref 0–0.1)
BUN BLDV-MCNC: 39 MG/DL (ref 7–22)
CALCIUM SERPL-MCNC: 9.3 MG/DL (ref 8.5–10.5)
CHLORIDE BLD-SCNC: 100 MEQ/L (ref 98–111)
CO2: 27 MEQ/L (ref 23–33)
CREAT SERPL-MCNC: 0.7 MG/DL (ref 0.4–1.2)
EOSINOPHIL # BLD: 0.1 %
EOSINOPHILS ABSOLUTE: 0 THOU/MM3 (ref 0–0.4)
ERYTHROCYTE [DISTWIDTH] IN BLOOD BY AUTOMATED COUNT: 11.9 % (ref 11.5–14.5)
ERYTHROCYTE [DISTWIDTH] IN BLOOD BY AUTOMATED COUNT: 38.5 FL (ref 35–45)
GFR SERPL CREATININE-BSD FRML MDRD: 83 ML/MIN/1.73M2
GLUCOSE BLD-MCNC: 174 MG/DL (ref 70–108)
GLUCOSE BLD-MCNC: 185 MG/DL (ref 70–108)
GLUCOSE BLD-MCNC: 200 MG/DL (ref 70–108)
GLUCOSE BLD-MCNC: 203 MG/DL (ref 70–108)
GLUCOSE BLD-MCNC: 236 MG/DL (ref 70–108)
GLUCOSE BLD-MCNC: 244 MG/DL (ref 70–108)
HCT VFR BLD CALC: 38.8 % (ref 37–47)
HEMOGLOBIN: 12.9 GM/DL (ref 12–16)
IMMATURE GRANS (ABS): 0.79 THOU/MM3 (ref 0–0.07)
IMMATURE GRANULOCYTES: 4 %
LYMPHOCYTES # BLD: 4.6 %
LYMPHOCYTES ABSOLUTE: 0.9 THOU/MM3 (ref 1–4.8)
MCH RBC QN AUTO: 29.6 PG (ref 26–33)
MCHC RBC AUTO-ENTMCNC: 33.2 GM/DL (ref 32.2–35.5)
MCV RBC AUTO: 89 FL (ref 81–99)
MONOCYTES # BLD: 6.2 %
MONOCYTES ABSOLUTE: 1.2 THOU/MM3 (ref 0.4–1.3)
NUCLEATED RED BLOOD CELLS: 0 /100 WBC
PLATELET # BLD: 237 THOU/MM3 (ref 130–400)
PMV BLD AUTO: 10.9 FL (ref 9.4–12.4)
POTASSIUM SERPL-SCNC: 4.7 MEQ/L (ref 3.5–5.2)
RBC # BLD: 4.36 MILL/MM3 (ref 4.2–5.4)
SEG NEUTROPHILS: 84.7 %
SEGMENTED NEUTROPHILS ABSOLUTE COUNT: 16.9 THOU/MM3 (ref 1.8–7.7)
SODIUM BLD-SCNC: 140 MEQ/L (ref 135–145)
WBC # BLD: 19.9 THOU/MM3 (ref 4.8–10.8)

## 2021-12-30 PROCEDURE — 80048 BASIC METABOLIC PNL TOTAL CA: CPT

## 2021-12-30 PROCEDURE — 82948 REAGENT STRIP/BLOOD GLUCOSE: CPT

## 2021-12-30 PROCEDURE — 6370000000 HC RX 637 (ALT 250 FOR IP): Performed by: NURSE PRACTITIONER

## 2021-12-30 PROCEDURE — 2100000000 HC CCU R&B

## 2021-12-30 PROCEDURE — 6360000002 HC RX W HCPCS: Performed by: NURSE PRACTITIONER

## 2021-12-30 PROCEDURE — 2500000003 HC RX 250 WO HCPCS: Performed by: NURSE PRACTITIONER

## 2021-12-30 PROCEDURE — 94003 VENT MGMT INPAT SUBQ DAY: CPT

## 2021-12-30 PROCEDURE — 6360000002 HC RX W HCPCS: Performed by: PHYSICIAN ASSISTANT

## 2021-12-30 PROCEDURE — 6370000000 HC RX 637 (ALT 250 FOR IP): Performed by: PHYSICIAN ASSISTANT

## 2021-12-30 PROCEDURE — 99233 SBSQ HOSP IP/OBS HIGH 50: CPT | Performed by: INTERNAL MEDICINE

## 2021-12-30 PROCEDURE — 2580000003 HC RX 258: Performed by: NURSE PRACTITIONER

## 2021-12-30 PROCEDURE — 85025 COMPLETE CBC W/AUTO DIFF WBC: CPT

## 2021-12-30 PROCEDURE — 93926 LOWER EXTREMITY STUDY: CPT

## 2021-12-30 PROCEDURE — 6360000002 HC RX W HCPCS: Performed by: INTERNAL MEDICINE

## 2021-12-30 RX ADMIN — ENOXAPARIN SODIUM 30 MG: 100 INJECTION SUBCUTANEOUS at 00:49

## 2021-12-30 RX ADMIN — INSULIN LISPRO 6 UNITS: 100 INJECTION, SOLUTION INTRAVENOUS; SUBCUTANEOUS at 12:30

## 2021-12-30 RX ADMIN — ENOXAPARIN SODIUM 30 MG: 100 INJECTION SUBCUTANEOUS at 12:39

## 2021-12-30 RX ADMIN — SODIUM CHLORIDE, PRESERVATIVE FREE 10 ML: 5 INJECTION INTRAVENOUS at 20:30

## 2021-12-30 RX ADMIN — INSULIN GLARGINE 35 UNITS: 100 INJECTION, SOLUTION SUBCUTANEOUS at 08:48

## 2021-12-30 RX ADMIN — DEXAMETHASONE SODIUM PHOSPHATE 6 MG: 4 INJECTION, SOLUTION INTRA-ARTICULAR; INTRALESIONAL; INTRAMUSCULAR; INTRAVENOUS; SOFT TISSUE at 00:49

## 2021-12-30 RX ADMIN — FAMOTIDINE 20 MG: 10 INJECTION, SOLUTION INTRAVENOUS at 08:48

## 2021-12-30 RX ADMIN — INSULIN LISPRO 6 UNITS: 100 INJECTION, SOLUTION INTRAVENOUS; SUBCUTANEOUS at 08:30

## 2021-12-30 RX ADMIN — ASPIRIN 81 MG CHEWABLE TABLET 81 MG: 81 TABLET CHEWABLE at 08:48

## 2021-12-30 RX ADMIN — BARICITINIB 4 MG: 2 TABLET, FILM COATED ORAL at 20:30

## 2021-12-30 RX ADMIN — INSULIN LISPRO 6 UNITS: 100 INJECTION, SOLUTION INTRAVENOUS; SUBCUTANEOUS at 20:35

## 2021-12-30 RX ADMIN — INSULIN LISPRO 3 UNITS: 100 INJECTION, SOLUTION INTRAVENOUS; SUBCUTANEOUS at 04:55

## 2021-12-30 RX ADMIN — INSULIN LISPRO 6 UNITS: 100 INJECTION, SOLUTION INTRAVENOUS; SUBCUTANEOUS at 16:29

## 2021-12-30 RX ADMIN — INSULIN LISPRO 3 UNITS: 100 INJECTION, SOLUTION INTRAVENOUS; SUBCUTANEOUS at 00:47

## 2021-12-30 RX ADMIN — CEFTRIAXONE SODIUM 1000 MG: 1 INJECTION, POWDER, FOR SOLUTION INTRAMUSCULAR; INTRAVENOUS at 15:02

## 2021-12-30 RX ADMIN — POLYETHYLENE GLYCOL 3350 17 G: 17 POWDER, FOR SOLUTION ORAL at 08:48

## 2021-12-30 RX ADMIN — FAMOTIDINE 20 MG: 10 INJECTION, SOLUTION INTRAVENOUS at 20:30

## 2021-12-30 RX ADMIN — CHLORHEXIDINE GLUCONATE 0.12% ORAL RINSE 15 ML: 1.2 LIQUID ORAL at 20:30

## 2021-12-30 RX ADMIN — INSULIN GLARGINE 35 UNITS: 100 INJECTION, SOLUTION SUBCUTANEOUS at 20:35

## 2021-12-30 RX ADMIN — FUROSEMIDE 40 MG: 10 INJECTION, SOLUTION INTRAMUSCULAR; INTRAVENOUS at 08:48

## 2021-12-30 RX ADMIN — CHLORHEXIDINE GLUCONATE 0.12% ORAL RINSE 15 ML: 1.2 LIQUID ORAL at 08:48

## 2021-12-30 ASSESSMENT — PULMONARY FUNCTION TESTS
PIF_VALUE: 20.1
PIF_VALUE: 19
PIF_VALUE: 22
PIF_VALUE: 17.8

## 2021-12-30 ASSESSMENT — PAIN SCALES - GENERAL: PAINLEVEL_OUTOF10: 0

## 2021-12-30 NOTE — PROGRESS NOTES
Comprehensive Nutrition Assessment    Type and Reason for Visit:  Reassess (tubefeeding)    Nutrition Recommendations/Plan:   -Recommend increase TF rate to Nepro 35 ml/hr goal today.  -Continue bolus 2.5 oz bottle [id number] (protein modular) daily.  -Free water flush per MD, last order 12/26/21: 30 ml every 2 hours.  -Continue bowel medication, last BM 12/27/21, note glycolax given today. Nutrition Assessment:    Pt. stable from nutritional standpoint AEB tolerating tubefeeding at 30 ml/hr. At risk for further nutrition compromise r/t +COVID 12/19/21 (12/12 onset of symptoms: loss of appetite, taste/ smell changes, nausea, diarrhea), acute respiratory failure, intubation 12/20/21, severe ARDS, previous pronation therapy, uncontrolled DM 2 with A1C (12/21) 9.9% and underlying medical condition (hx CAD, DM, obesity).  Nutrition recommendations/interventions as per above. Malnutrition Assessment:  Malnutrition Status:  Insufficient data    Context:  Acute Illness     Findings of the 6 clinical characteristics of malnutrition:  Energy Intake:   (loss of appetite x 1 week PTA reported on admission)  Weight Loss:  Unable to assess (no weight records per EMR)     Body Fat Loss:  Unable to assess     Muscle Mass Loss:  Unable to assess    Fluid Accumulation:  Unable to assess     Strength:  Not Performed    Estimated Daily Nutrient Needs:  Energy (kcal):  5409-5168 kcals (30-32); Weight Used for Energy Requirements:  Ideal (50kgm)     Protein (g):  100 grams or greater (2); Weight Used for Protein Requirements:  Ideal (50kgm)        Fluid (ml/day):  as per Doctor; Method Used for Fluid Requirements:  Other (Comment)      Nutrition Related Findings:   Patient remains intubated. MAP 80. Per RN pronation therapy stopped. TF's at 30 ml/hr and tolerating well, discussed with RN plan to increase TF rate today. Last BM 12/27/21, note prn glycolax given today.   12/30/21: Sodium 140, Potassium 4.7, BUN 39, provide % of nutrient needs for COVID recovery while intubated       Nutrition Monitoring and Evaluation:   Behavioral-Environmental Outcomes:  None Identified   Food/Nutrient Intake Outcomes:  Enteral Nutrition Intake/Tolerance  Physical Signs/Symptoms Outcomes:  Biochemical Data,Fluid Status or Edema,Hemodynamic Status,Nutrition Focused Physical Findings,Skin,Weight,GI Status     Discharge Planning:     Too soon to determine     Electronically signed by Al Laws RD, LD on 12/30/21 at 12:20 PM EST    Contact: [phone]

## 2021-12-30 NOTE — CARE COORDINATION
Discharge planning update:    Remains intubated on ventilator. FiO2 50%, 50 liters oxygen , PEEP 8 with saturations at 93%. TF via OG tube remains, Dietician following. From home with spouse. Will follow for potential needs and or placements.    Electronically signed by Timmy Ledbetter RN on 12/30/2021 at 11:27 AM

## 2021-12-30 NOTE — PROGRESS NOTES
CRITICAL CARE PROGRESS NOTE      Patient:  Mandeep Roberson    Unit/Bed:4B-07/007-A  YOB: 1950  MRN: [id number]   PCP: Yohana Brown  Date of Admission: 12/18/2021  Chief Complaint:- Acute hypoxic respiratory failure    Assessment and Plan:      1. Acute hypoxic respiratory failure: Patient initially presented on nasal cannula escalated to high flow and BiPAP.  She was intubated on 12/20/2021.  Maintain lung protective strategies with peak pressure less than 35.  Continue to wean as tolerates  2. SSKIF-72: DWMEN of symptoms 12/12/2021.  Positive on 12/17, continue Decadron and baricitinib. 3. ARDS: Status post pronation therapy  4. Bacterial pneumonia: Respiratory culture positive for Klebsiella oxytocin, sensitivity to Rocephin.  Initiate Rocephin day #5  5. Mild hyperkalemia: Resolved; mild, monitor patient on insulin drip for uncontrolled diabetes. 6. Diabetes mellitus type 2 uncontrolled: s/p insulin drip.  A1c noted at 9.9.  On sliding scale insulin and Humalog, lantus done adjusted to 30 units twice daily  7. Cardiomyopathy: Ejection fraction noted from 30 to 35%.  [facility]. Dayna De La Fuente does have history of stent placement in 2008. Jesus Fears if this is new onset cardiomyopathy or pre-existing.  Patient is noted be bradycardic requiring dopamine.  Cardiology consulted.  On oral lasix   8. Hypertension: Listed in history, currently normotensive. Monitor   9. Bradycardia: Resolved;  requiring dopamine.  Cardiology consulted for cardiomyopathy.    10. Leukocytosis: WBC 19.0, no fever. Continue ATB therapy  11. Obesity: BMI 35.63  12.  CAD: history of stent placement, 2008.      INITIAL H AND P AND ICU COURSE:  Kaleigh Sierra is a 72-year-old white female who presented to [facility] on 12/18/2021 with complaints of shortness of breath she has a past medical history lifetime non-smoker, CAD with stent placement in 2008, hypertension, diabetes mellitus, dyslipidemia.  Per report she presented to [facility] on 12/18 with complaints of hypoxia and shortness of breath.  She was initially placed on 4 L nasal cannula.  12/19 patient had persistent hypoxia and was transitioned to high flow nasal cannula.  On 12/20 patient was transitioned to BiPAP and transferred to [facility] ICU for intubation.     Past Medical History: Per HPI  Family History: No known family history  Social History: Lifetime non-smoker, denies alcohol or drug use.     ROS   Sedated on mechanical ventilation     Scheduled Meds:   insulin glargine  35 Units SubCUTAneous BID    dexamethasone  6 mg IntraVENous Q24H    furosemide  40 mg IntraVENous Daily    cefTRIAXone (ROCEPHIN) IV  1,000 mg IntraVENous Q24H    insulin lispro  0-18 Units SubCUTAneous Q4H    baricitinib  4 mg Per NG tube Nightly    chlorhexidine  15 mL Mouth/Throat BID    famotidine (PEPCID) injection  20 mg IntraVENous BID    aspirin  81 mg Per NG tube Daily    enoxaparin  30 mg SubCUTAneous Q12H     Continuous Infusions:   dexmedetomidine 0.04 mcg/kg/hr (12/30/21 1352)    fentaNYL 500 mcg in dextrose 5% 100 ml infusion Stopped (12/30/21 0727)    sodium chloride      midazolam Stopped (12/27/21 1641)    dextrose         PHYSICAL EXAMINATION:  T:  99.5. P:  97. RR:  24. B/P:  112/77. FiO2:  50. O2 Sat:  93.  I/O:  1114/1950  Body mass index is 35.63 kg/m². PC: 12/8: TV: 371: RRTotal: 21: Ti:1 sec  General:   Acute on chronically ill-appearing white female  HEENT:  normocephalic and atraumatic. No scleral icterus. PERR  Neck: supple. No Thyromegaly. Lungs: clear to auscultation. No retractions  Cardiac: RRR. No JVD. Abdomen: soft. Nontender. Extremities:  No clubbing, cyanosis, or edema x 4. Vasculature: capillary refill < 3 seconds. Palpable dorsalis pedis pulses. Skin:  warm and dry.   Cool left lower extremity  Psych: Awakes and follows commands on mechanical ventilation  Lymph:  No supraclavicular adenopathy. Neurologic:  No focal deficit. No seizures. Data: (All radiographs, tracings, PFTs, and imaging are personally viewed and interpreted unless otherwise noted).  Sodium 140, potassium 4.7, chloride 100, CO2 27, BUN 3039, creatinine 0.7, anion gap 13.0, glucose 185.  WBC 19.9, hemoglobin 12.9, hematocrit 38.8, platelet count 831   Telemetry shows NSR   · Chest x-ray 12/28/2021 shows peripheral densities. Meets Continued ICU Level Care Criteria:    [x] Yes   [] No - Transfer Planned to listed location:  [] HOSPITALIST CONTACTED-      Case and plan discussed with Dr. Jazzy Uribe. Electronically signed by Watson Maguire.  WYATT Snaders - [facility]  CRITICAL CARE SPECIALIST

## 2021-12-30 NOTE — SIGNIFICANT EVENT
Updated  Jo Dimas via telephone. I explained that we will try to extubate tomorrow if she fails she will need likely need tracheostomy tube placement. He voiced understanding. All questions were answered. Electronically signed by Shani Valente.  Levi Garcia CNP on 12/30/2021 at 3:49 PM

## 2021-12-31 LAB
ACINETOBACTER CALCOACETICUS-BAUMANNII BY PCR: NOT DETECTED
ADENOVIRUS BY PCR: NOT DETECTED
ANION GAP SERPL CALCULATED.3IONS-SCNC: 14 MEQ/L (ref 8–16)
BACTERIA: ABNORMAL
BILIRUBIN URINE: NEGATIVE
BLOOD, URINE: ABNORMAL
BUN BLDV-MCNC: 39 MG/DL (ref 7–22)
CALCIUM SERPL-MCNC: 9.2 MG/DL (ref 8.5–10.5)
CASTS: ABNORMAL /LPF
CASTS: ABNORMAL /LPF
CHARACTER, URINE: CLEAR
CHLAMYDIA PNEUMONIAE BY PCR: NOT DETECTED
CHLORIDE BLD-SCNC: 97 MEQ/L (ref 98–111)
CO2: 28 MEQ/L (ref 23–33)
COLOR: YELLOW
CREAT SERPL-MCNC: 0.7 MG/DL (ref 0.4–1.2)
CRYSTALS: ABNORMAL
ENTEROBACTER CLOACAE COMPLEX BY PCR: NOT DETECTED
EPITHELIAL CELLS, UA: ABNORMAL /HPF
ERYTHROCYTE [DISTWIDTH] IN BLOOD BY AUTOMATED COUNT: 11.9 % (ref 11.5–14.5)
ERYTHROCYTE [DISTWIDTH] IN BLOOD BY AUTOMATED COUNT: 39.9 FL (ref 35–45)
ESCHERICHIA COLI BY PCR: NOT DETECTED
FERRITIN: 1554 NG/ML (ref 10–291)
GFR SERPL CREATININE-BSD FRML MDRD: 83 ML/MIN/1.73M2
GLUCOSE BLD-MCNC: 186 MG/DL (ref 70–108)
GLUCOSE BLD-MCNC: 198 MG/DL (ref 70–108)
GLUCOSE BLD-MCNC: 202 MG/DL (ref 70–108)
GLUCOSE BLD-MCNC: 298 MG/DL (ref 70–108)
GLUCOSE BLD-MCNC: 302 MG/DL (ref 70–108)
GLUCOSE BLD-MCNC: 304 MG/DL (ref 70–108)
GLUCOSE BLD-MCNC: 328 MG/DL (ref 70–108)
GLUCOSE BLD-MCNC: 356 MG/DL (ref 70–108)
GLUCOSE, URINE: 500 MG/DL
HAEMOPHILUS INFLUENZAE BY PCR: NOT DETECTED
HCT VFR BLD CALC: 42.4 % (ref 37–47)
HEMOGLOBIN: 13.6 GM/DL (ref 12–16)
INFLUENZA A BY PCR: NOT DETECTED
INFLUENZA B BY PCR: NOT DETECTED
KETONES, URINE: NEGATIVE
KLEBSIELLA AEROGENES BY PCR: NOT DETECTED
KLEBSIELLA OXYTOCA BY PCR: NOT DETECTED
KLEBSIELLA PNEUMONIAE GROUP BY PCR: NOT DETECTED
LACTIC ACID: 2.7 MMOL/L (ref 0.5–2)
LEGIONELLA PNEUMOPHILIA BY PCR: NOT DETECTED
LEUKOCYTE EST, POC: NEGATIVE
MCH RBC QN AUTO: 29.6 PG (ref 26–33)
MCHC RBC AUTO-ENTMCNC: 32.1 GM/DL (ref 32.2–35.5)
MCV RBC AUTO: 92.2 FL (ref 81–99)
METAPNEUMOVIRUS BY PCR: NOT DETECTED
MISCELLANEOUS LAB TEST RESULT: ABNORMAL
MORAXELLA CATARRHALIS BY PCR: NOT DETECTED
MYCOPLASMA PNEUMONIAE BY PCR: NOT DETECTED
NITRITE, URINE: NEGATIVE
NON-SARS CORONAVIRUS: NOT DETECTED
PARAINFLUENZA VIRUS BY PCR: NOT DETECTED
PH UA: 6 (ref 5–9)
PLATELET # BLD: 238 THOU/MM3 (ref 130–400)
PMV BLD AUTO: 11 FL (ref 9.4–12.4)
POTASSIUM REFLEX MAGNESIUM: 4.6 MEQ/L (ref 3.5–5.2)
PROCALCITONIN: 0.06 NG/ML (ref 0.01–0.09)
PROTEIN UA: NEGATIVE MG/DL
PROTEUS SPECIES BY PCR: NOT DETECTED
PSEUDOMONAS AERUGINOSA BY PCR: NOT DETECTED
RBC # BLD: 4.6 MILL/MM3 (ref 4.2–5.4)
RBC URINE: ABNORMAL /HPF
RENAL EPITHELIAL, UA: ABNORMAL
RESISTANT GENE CTX-M BY PCR: NORMAL
RESISTANT GENE IMP BY PCR: NORMAL
RESISTANT GENE KPC BY PCR: NORMAL
RESISTANT GENE MECA/C & MREJ BY PCR: NORMAL
RESISTANT GENE NDM BY PCR: NORMAL
RESISTANT GENE OXA-48-LIKE BY PCR: NORMAL
RESISTANT GENE VIM BY PCR: NORMAL
RESPIRATORY SYNCYTIAL VIRUS BY PCR: NOT DETECTED
RHINOVIRUS ENTEROVIRUS PCR: NOT DETECTED
SERRATIA MARCESCENS BY PCR: NOT DETECTED
SODIUM BLD-SCNC: 139 MEQ/L (ref 135–145)
SOURCE: NORMAL
SPECIFIC GRAVITY UA: 1.03 (ref 1–1.03)
SPECIMEN ACCEPTABILITY: NORMAL
STAPH AUREUS BY PCR: NOT DETECTED
STREP AGALACTIAE BY PCR: NOT DETECTED
STREP PNEUMONIAE BY PCR: NOT DETECTED
STREP PYOGENES BY PCR: NOT DETECTED
UROBILINOGEN, URINE: 1 EU/DL (ref 0–1)
WBC # BLD: 16.6 THOU/MM3 (ref 4.8–10.8)
WBC UA: ABNORMAL /HPF
YEAST: ABNORMAL

## 2021-12-31 PROCEDURE — 87541 LEGION PNEUMO DNA AMP PROB: CPT

## 2021-12-31 PROCEDURE — 83605 ASSAY OF LACTIC ACID: CPT

## 2021-12-31 PROCEDURE — 87186 SC STD MICRODIL/AGAR DIL: CPT

## 2021-12-31 PROCEDURE — 2500000003 HC RX 250 WO HCPCS: Performed by: NURSE PRACTITIONER

## 2021-12-31 PROCEDURE — 6370000000 HC RX 637 (ALT 250 FOR IP): Performed by: NURSE PRACTITIONER

## 2021-12-31 PROCEDURE — 87581 M.PNEUMON DNA AMP PROBE: CPT

## 2021-12-31 PROCEDURE — 87205 SMEAR GRAM STAIN: CPT

## 2021-12-31 PROCEDURE — 82728 ASSAY OF FERRITIN: CPT

## 2021-12-31 PROCEDURE — 87077 CULTURE AEROBIC IDENTIFY: CPT

## 2021-12-31 PROCEDURE — 6360000002 HC RX W HCPCS: Performed by: NURSE PRACTITIONER

## 2021-12-31 PROCEDURE — 84145 PROCALCITONIN (PCT): CPT

## 2021-12-31 PROCEDURE — 87486 CHLMYD PNEUM DNA AMP PROBE: CPT

## 2021-12-31 PROCEDURE — 51702 INSERT TEMP BLADDER CATH: CPT

## 2021-12-31 PROCEDURE — 2500000003 HC RX 250 WO HCPCS: Performed by: INTERNAL MEDICINE

## 2021-12-31 PROCEDURE — 87631 RESP VIRUS 3-5 TARGETS: CPT

## 2021-12-31 PROCEDURE — 82948 REAGENT STRIP/BLOOD GLUCOSE: CPT

## 2021-12-31 PROCEDURE — 6360000002 HC RX W HCPCS: Performed by: PHYSICIAN ASSISTANT

## 2021-12-31 PROCEDURE — 87801 DETECT AGNT MULT DNA AMPLI: CPT

## 2021-12-31 PROCEDURE — 2100000000 HC CCU R&B

## 2021-12-31 PROCEDURE — 99233 SBSQ HOSP IP/OBS HIGH 50: CPT | Performed by: INTERNAL MEDICINE

## 2021-12-31 PROCEDURE — 80048 BASIC METABOLIC PNL TOTAL CA: CPT

## 2021-12-31 PROCEDURE — 87040 BLOOD CULTURE FOR BACTERIA: CPT

## 2021-12-31 PROCEDURE — 81001 URINALYSIS AUTO W/SCOPE: CPT

## 2021-12-31 PROCEDURE — 36415 COLL VENOUS BLD VENIPUNCTURE: CPT

## 2021-12-31 PROCEDURE — 87070 CULTURE OTHR SPECIMN AEROBIC: CPT

## 2021-12-31 PROCEDURE — 2580000003 HC RX 258: Performed by: NURSE PRACTITIONER

## 2021-12-31 PROCEDURE — 94003 VENT MGMT INPAT SUBQ DAY: CPT

## 2021-12-31 PROCEDURE — 87798 DETECT AGENT NOS DNA AMP: CPT

## 2021-12-31 PROCEDURE — 87147 CULTURE TYPE IMMUNOLOGIC: CPT

## 2021-12-31 PROCEDURE — 85027 COMPLETE CBC AUTOMATED: CPT

## 2021-12-31 PROCEDURE — 6360000002 HC RX W HCPCS: Performed by: INTERNAL MEDICINE

## 2021-12-31 RX ORDER — ACETAMINOPHEN 650 MG/1
650 SUPPOSITORY RECTAL EVERY 4 HOURS PRN
Status: DISCONTINUED | OUTPATIENT
Start: 2021-12-31 | End: 2022-01-14 | Stop reason: HOSPADM

## 2021-12-31 RX ORDER — INSULIN GLARGINE 100 [IU]/ML
40 INJECTION, SOLUTION SUBCUTANEOUS 2 TIMES DAILY
Status: DISCONTINUED | OUTPATIENT
Start: 2021-12-31 | End: 2022-01-04

## 2021-12-31 RX ORDER — ROSUVASTATIN CALCIUM 10 MG/1
10 TABLET, COATED ORAL DAILY
Status: DISCONTINUED | OUTPATIENT
Start: 2021-12-31 | End: 2022-01-14 | Stop reason: HOSPADM

## 2021-12-31 RX ORDER — ACETAMINOPHEN 325 MG/1
650 TABLET ORAL EVERY 4 HOURS PRN
Status: DISCONTINUED | OUTPATIENT
Start: 2021-12-31 | End: 2022-01-14 | Stop reason: HOSPADM

## 2021-12-31 RX ADMIN — FAMOTIDINE 20 MG: 10 INJECTION, SOLUTION INTRAVENOUS at 08:49

## 2021-12-31 RX ADMIN — CHLORHEXIDINE GLUCONATE 0.12% ORAL RINSE 15 ML: 1.2 LIQUID ORAL at 08:47

## 2021-12-31 RX ADMIN — DEXMEDETOMIDINE HYDROCHLORIDE 0.4 MCG/KG/HR: 4 INJECTION, SOLUTION INTRAVENOUS at 06:02

## 2021-12-31 RX ADMIN — CEFTRIAXONE SODIUM 1000 MG: 1 INJECTION, POWDER, FOR SOLUTION INTRAMUSCULAR; INTRAVENOUS at 14:34

## 2021-12-31 RX ADMIN — DEXAMETHASONE SODIUM PHOSPHATE 6 MG: 4 INJECTION, SOLUTION INTRA-ARTICULAR; INTRALESIONAL; INTRAMUSCULAR; INTRAVENOUS; SOFT TISSUE at 00:23

## 2021-12-31 RX ADMIN — INSULIN LISPRO 3 UNITS: 100 INJECTION, SOLUTION INTRAVENOUS; SUBCUTANEOUS at 00:23

## 2021-12-31 RX ADMIN — ACETAMINOPHEN 650 MG: 325 TABLET ORAL at 20:43

## 2021-12-31 RX ADMIN — CHLORHEXIDINE GLUCONATE 0.12% ORAL RINSE 15 ML: 1.2 LIQUID ORAL at 20:43

## 2021-12-31 RX ADMIN — INSULIN LISPRO 9 UNITS: 100 INJECTION, SOLUTION INTRAVENOUS; SUBCUTANEOUS at 22:12

## 2021-12-31 RX ADMIN — FENTANYL CITRATE 25 MCG: 50 INJECTION INTRAMUSCULAR; INTRAVENOUS at 13:10

## 2021-12-31 RX ADMIN — ACETAMINOPHEN 650 MG: 325 TABLET ORAL at 08:44

## 2021-12-31 RX ADMIN — DEXMEDETOMIDINE HYDROCHLORIDE 0.5 MCG/KG/HR: 4 INJECTION, SOLUTION INTRAVENOUS at 15:56

## 2021-12-31 RX ADMIN — ROSUVASTATIN 10 MG: 10 TABLET, FILM COATED ORAL at 20:44

## 2021-12-31 RX ADMIN — INSULIN GLARGINE 40 UNITS: 100 INJECTION, SOLUTION SUBCUTANEOUS at 22:12

## 2021-12-31 RX ADMIN — ENOXAPARIN SODIUM 30 MG: 100 INJECTION SUBCUTANEOUS at 00:30

## 2021-12-31 RX ADMIN — INSULIN LISPRO 12 UNITS: 100 INJECTION, SOLUTION INTRAVENOUS; SUBCUTANEOUS at 10:56

## 2021-12-31 RX ADMIN — ENOXAPARIN SODIUM 30 MG: 100 INJECTION SUBCUTANEOUS at 13:17

## 2021-12-31 RX ADMIN — INSULIN LISPRO 15 UNITS: 100 INJECTION, SOLUTION INTRAVENOUS; SUBCUTANEOUS at 14:30

## 2021-12-31 RX ADMIN — INSULIN LISPRO 3 UNITS: 100 INJECTION, SOLUTION INTRAVENOUS; SUBCUTANEOUS at 05:58

## 2021-12-31 RX ADMIN — ASPIRIN 81 MG CHEWABLE TABLET 81 MG: 81 TABLET CHEWABLE at 08:44

## 2021-12-31 RX ADMIN — ACETAMINOPHEN 650 MG: 325 TABLET ORAL at 13:14

## 2021-12-31 RX ADMIN — INSULIN GLARGINE 35 UNITS: 100 INJECTION, SOLUTION SUBCUTANEOUS at 08:38

## 2021-12-31 RX ADMIN — INSULIN LISPRO 12 UNITS: 100 INJECTION, SOLUTION INTRAVENOUS; SUBCUTANEOUS at 18:08

## 2021-12-31 RX ADMIN — FAMOTIDINE 20 MG: 10 INJECTION, SOLUTION INTRAVENOUS at 20:43

## 2021-12-31 RX ADMIN — BARICITINIB 4 MG: 2 TABLET, FILM COATED ORAL at 20:43

## 2021-12-31 RX ADMIN — FUROSEMIDE 40 MG: 10 INJECTION, SOLUTION INTRAMUSCULAR; INTRAVENOUS at 08:51

## 2021-12-31 ASSESSMENT — PULMONARY FUNCTION TESTS
PIF_VALUE: 20
PIF_VALUE: 22
PIF_VALUE: 21
PIF_VALUE: 22

## 2021-12-31 ASSESSMENT — PAIN SCALES - GENERAL
PAINLEVEL_OUTOF10: 0

## 2021-12-31 NOTE — PLAN OF CARE
Problem: Non-Violent Restraints  Goal: Removal from restraints as soon as assessed to be safe  Outcome: Ongoing  Note: Pt is intermittently pulling at lines and tubes at this time. Attempts at equipment disguise and redirection failed. Skin care done, pt being turned Q2H and PRN. Hourly skin and circulation checks. Will continue to monitor    Goal: No harm/injury to patient while restraints in use  Outcome: Ongoing  Note: Absence of restraint related injury       Care plan reviewed with patient.  Will review and discuss care plan with family when available

## 2021-12-31 NOTE — SIGNIFICANT EVENT
Lactic noted is 2.7, pro Ole negative. This appears to be secondary to hypoxia less likely sepsis. Continue to monitor. All cultures remain negative. This is likely secondary to Covid. Electronically signed by Angelica Evans - CNP on 12/31/2021 at 4:40 PM

## 2021-12-31 NOTE — PROGRESS NOTES
CRITICAL CARE PROGRESS NOTE      Patient:  Merlin Barrs    Unit/Bed:4B-07/007-A  YOB: 1950  MRN: [id number]   PCP: Maria E Maher  Date of Admission: 12/18/2021  Chief Complaint:- Acute hypoxic respiratory failure    Assessment and Plan:    1. Acute hypoxic respiratory failure: Patient initially presented on nasal cannula escalated to high flow and BiPAP.  She was intubated on 12/20/2021.  Maintain lung protective strategies with peak pressure less than 35.  Continue to wean as tolerates  2. [id number]-05: ZCEDT of symptoms 12/12/2021.  Positive on 12/17, continue Decadron and baricitinib. 3. ARDS: Status post pronation therapy  4. Bacterial pneumonia: Respiratory culture positive for Klebsiella oxytocin, sensitivity to Rocephin.  Initiate Rocephin day #5  5. Mild hyperkalemia: Resolved; mild, monitor patient on insulin drip for uncontrolled diabetes. 6. Diabetes mellitus type 2 uncontrolled: s/p insulin drip.  A1c noted at 9.9.  On sliding scale insulin and Humalog, lantus done adjusted to 40 units twice daily  7. Fever of unknown origin: Patient had increased fever overnight. T-max 103.3. Panculture. Awaiting results. Tylenol. Cooling blanket. Ferritin and pro-Ole pending  8. Cardiomyopathy: Ejection fraction noted from 30 to 35%.  No noted echo at [facility]'s are in care everywhere. Bailey Whyte does have history of stent placement in 2008. Nieves Hoot if this is new onset cardiomyopathy or pre-existing.  Patient is noted be bradycardic requiring dopamine.  Cardiology consulted.  On IV lasix   9. Hypertension: Listed in history, currently normotensive. Monitor   10. Bradycardia: Resolved;  requiring dopamine.  Cardiology consulted for cardiomyopathy.    11. Leukocytosis: WBC 16.6, + fever. Continue ATB therapy  12. Obesity: BMI 34.66  13.  CAD: history of stent placement, 2008.      INITIAL H AND P AND ICU COURSE:  Kaleigh Sierra is a 62-year-old white female who presented to [facility] on 12/18/2021 with complaints of shortness of breath she has a past medical history lifetime non-smoker, CAD with stent placement in 2008, hypertension, diabetes mellitus, dyslipidemia.  Per report she presented to [facility] on 12/18 with complaints of hypoxia and shortness of breath.  She was initially placed on 4 L nasal cannula.  12/19 patient had persistent hypoxia and was transitioned to high flow nasal cannula.  On 12/20 patient was transitioned to BiPAP and transferred to [facility] ICU for intubation.     Past Medical History: Per HPI  Family History: No known family history  Social History: Lifetime non-smoker, denies alcohol or drug use.     ROS   Sedated on mechanical ventilation    Scheduled Meds:   insulin glargine  40 Units SubCUTAneous BID    dexamethasone  6 mg IntraVENous Q24H    furosemide  40 mg IntraVENous Daily    cefTRIAXone (ROCEPHIN) IV  1,000 mg IntraVENous Q24H    insulin lispro  0-18 Units SubCUTAneous Q4H    baricitinib  4 mg Per NG tube Nightly    chlorhexidine  15 mL Mouth/Throat BID    famotidine (PEPCID) injection  20 mg IntraVENous BID    aspirin  81 mg Per NG tube Daily    enoxaparin  30 mg SubCUTAneous Q12H     Continuous Infusions:   dexmedetomidine 0.5 mcg/kg/hr (12/31/21 1412)    sodium chloride      dextrose         PHYSICAL EXAMINATION:  T:  101.9.  P:  63. RR:  20. B/P:  118/78. FiO2:  50. O2 Sat:  96.  I/O:  1299/1500  Body mass index is 34.66 kg/m². PC: 14/8: TV: 341: RRTotal: 20: Ti:1 sec  General: Acute on chronically ill-appearing white female  HEENT:  normocephalic and atraumatic. No scleral icterus. PERR  Neck: supple. No Thyromegaly. Lungs: clear to auscultation. No retractions  Cardiac: RRR. No JVD. Abdomen: soft. Nontender. Extremities:  No clubbing, cyanosis, or edema x 4. Vasculature: capillary refill < 3 seconds. Palpable dorsalis pedis pulses. Skin:  warm and dry.   Cool left lower extremity  Psych: Sedated on mechanical ventilation  Lymph:  No supraclavicular adenopathy. Neurologic:  No focal deficit. No seizures. Data: (All radiographs, tracings, PFTs, and imaging are personally viewed and interpreted unless otherwise noted).  Sodium 139, potassium 4.6, chloride 97, CO2 28, BUN 39, creatinine 0.7, anion gap 14.0, glucose 198.  WBC 16.6, hemoglobin 13.6, hematocrit 42.4, platelet count 869   Telemetry shows normal sinus rhythm   Pneumonia panel negative      Meets Continued ICU Level Care Criteria:    [x] Yes   [] No - Transfer Planned to listed location:  [] HOSPITALIST CONTACTED-      Case and plan discussed with Dr. Wilian Pina. Electronically signed by Priya Quiroga.  WYATT Aponte - CNP  CRITICAL CARE SPECIALIST

## 2022-01-01 ENCOUNTER — APPOINTMENT (OUTPATIENT)
Dept: GENERAL RADIOLOGY | Age: 72
DRG: 207 | End: 2022-01-01
Payer: MEDICARE

## 2022-01-01 LAB
ACINETOBACTER BAUMANNII FILM ARRAY: NOT DETECTED
ANION GAP SERPL CALCULATED.3IONS-SCNC: 14 MEQ/L (ref 8–16)
BOTTLE TYPE: ABNORMAL
BUN BLDV-MCNC: 45 MG/DL (ref 7–22)
CALCIUM SERPL-MCNC: 9 MG/DL (ref 8.5–10.5)
CANDIDA ALBICANS FILM ARRAY: NOT DETECTED
CANDIDA GLABRATA FILM ARRAY: NOT DETECTED
CANDIDA KRUSEI FILM ARRAY: NOT DETECTED
CANDIDA PARAPSILOSIS FILM ARRAY: NOT DETECTED
CANDIDA TROPICALIS FILM ARRAY: NOT DETECTED
CARBAPENEM RESITANT FILM ARRAY: ABNORMAL
CHLORIDE BLD-SCNC: 99 MEQ/L (ref 98–111)
CO2: 27 MEQ/L (ref 23–33)
CREAT SERPL-MCNC: 0.8 MG/DL (ref 0.4–1.2)
ENTERBACTER CLOACAE FILM ARRAY: NOT DETECTED
ENTERBACTERIACEAE FILM ARRAY: NOT DETECTED
ENTEROCOCCUS FILM ARRAY: NOT DETECTED
ERYTHROCYTE [DISTWIDTH] IN BLOOD BY AUTOMATED COUNT: 11.9 % (ref 11.5–14.5)
ERYTHROCYTE [DISTWIDTH] IN BLOOD BY AUTOMATED COUNT: 38.7 FL (ref 35–45)
ESCHERICHIA COLI FILM ARRAY: NOT DETECTED
FERRITIN: 1785 NG/ML (ref 10–291)
GFR SERPL CREATININE-BSD FRML MDRD: 71 ML/MIN/1.73M2
GLUCOSE BLD-MCNC: 223 MG/DL (ref 70–108)
GLUCOSE BLD-MCNC: 232 MG/DL (ref 70–108)
GLUCOSE BLD-MCNC: 247 MG/DL (ref 70–108)
GLUCOSE BLD-MCNC: 253 MG/DL (ref 70–108)
GLUCOSE BLD-MCNC: 255 MG/DL (ref 70–108)
GLUCOSE BLD-MCNC: 276 MG/DL (ref 70–108)
GLUCOSE BLD-MCNC: 291 MG/DL (ref 70–108)
GLUCOSE BLD-MCNC: 326 MG/DL (ref 70–108)
HAEMOPHILUS INFLUENZA FILM ARRAY: NOT DETECTED
HCT VFR BLD CALC: 40.2 % (ref 37–47)
HEMOGLOBIN: 13.2 GM/DL (ref 12–16)
KLEBSIELLA OXYTOCA FILM ARRAY: NOT DETECTED
KLEBSIELLA PNEUMONIAE FILM ARRAY: NOT DETECTED
LISTERIA MONOCYTOGENES FILM ARRAY: NOT DETECTED
MCH RBC QN AUTO: 29.6 PG (ref 26–33)
MCHC RBC AUTO-ENTMCNC: 32.8 GM/DL (ref 32.2–35.5)
MCV RBC AUTO: 90.1 FL (ref 81–99)
METHICILLIN RESISTANT FILM ARRAY: DETECTED
NEISSERIA MENIGITIDIS FILM ARRAY: NOT DETECTED
PLATELET # BLD: 188 THOU/MM3 (ref 130–400)
PMV BLD AUTO: 10.7 FL (ref 9.4–12.4)
POTASSIUM REFLEX MAGNESIUM: 4.2 MEQ/L (ref 3.5–5.2)
PROCALCITONIN: 0.06 NG/ML (ref 0.01–0.09)
PROTEUS FILM ARRAY: NOT DETECTED
PSEUDOMONAS AERUGINOSA FILM ARRAY: NOT DETECTED
RBC # BLD: 4.46 MILL/MM3 (ref 4.2–5.4)
SERRATIA MARCESCENS FILM ARRAY: NOT DETECTED
SODIUM BLD-SCNC: 140 MEQ/L (ref 135–145)
SOURCE OF BLOOD CULTURE: ABNORMAL
STAPH AUREUS FILM ARRAY: NOT DETECTED
STAPHYLOCOCCUS FILM ARRAY: DETECTED
STREP AGALACTIAE FILM ARRAY: NOT DETECTED
STREP PNEUMONIAE FILM ARRAY: NOT DETECTED
STREP PYOCGENES FILM ARRAY: NOT DETECTED
STREPTOCOCCUS FILM ARRAY: NOT DETECTED
VANCOMYCIN RESISTANT FILM ARRAY: ABNORMAL
WBC # BLD: 10.2 THOU/MM3 (ref 4.8–10.8)

## 2022-01-01 PROCEDURE — 82728 ASSAY OF FERRITIN: CPT

## 2022-01-01 PROCEDURE — 99233 SBSQ HOSP IP/OBS HIGH 50: CPT | Performed by: INTERNAL MEDICINE

## 2022-01-01 PROCEDURE — 85027 COMPLETE CBC AUTOMATED: CPT

## 2022-01-01 PROCEDURE — 6370000000 HC RX 637 (ALT 250 FOR IP): Performed by: NURSE PRACTITIONER

## 2022-01-01 PROCEDURE — 36415 COLL VENOUS BLD VENIPUNCTURE: CPT

## 2022-01-01 PROCEDURE — 71045 X-RAY EXAM CHEST 1 VIEW: CPT

## 2022-01-01 PROCEDURE — 2580000003 HC RX 258: Performed by: STUDENT IN AN ORGANIZED HEALTH CARE EDUCATION/TRAINING PROGRAM

## 2022-01-01 PROCEDURE — 2500000003 HC RX 250 WO HCPCS: Performed by: NURSE PRACTITIONER

## 2022-01-01 PROCEDURE — 6360000002 HC RX W HCPCS: Performed by: PHYSICIAN ASSISTANT

## 2022-01-01 PROCEDURE — 6360000002 HC RX W HCPCS: Performed by: INTERNAL MEDICINE

## 2022-01-01 PROCEDURE — 84145 PROCALCITONIN (PCT): CPT

## 2022-01-01 PROCEDURE — XW033H5 INTRODUCTION OF TOCILIZUMAB INTO PERIPHERAL VEIN, PERCUTANEOUS APPROACH, NEW TECHNOLOGY GROUP 5: ICD-10-PCS | Performed by: STUDENT IN AN ORGANIZED HEALTH CARE EDUCATION/TRAINING PROGRAM

## 2022-01-01 PROCEDURE — 6360000002 HC RX W HCPCS: Performed by: NURSE PRACTITIONER

## 2022-01-01 PROCEDURE — 6360000002 HC RX W HCPCS: Performed by: STUDENT IN AN ORGANIZED HEALTH CARE EDUCATION/TRAINING PROGRAM

## 2022-01-01 PROCEDURE — 82948 REAGENT STRIP/BLOOD GLUCOSE: CPT

## 2022-01-01 PROCEDURE — 2580000003 HC RX 258: Performed by: NURSE PRACTITIONER

## 2022-01-01 PROCEDURE — 80048 BASIC METABOLIC PNL TOTAL CA: CPT

## 2022-01-01 PROCEDURE — 94003 VENT MGMT INPAT SUBQ DAY: CPT

## 2022-01-01 PROCEDURE — 2100000000 HC CCU R&B

## 2022-01-01 PROCEDURE — 2500000003 HC RX 250 WO HCPCS: Performed by: INTERNAL MEDICINE

## 2022-01-01 RX ORDER — KETOROLAC TROMETHAMINE 30 MG/ML
15 INJECTION, SOLUTION INTRAMUSCULAR; INTRAVENOUS ONCE
Status: COMPLETED | OUTPATIENT
Start: 2022-01-01 | End: 2022-01-01

## 2022-01-01 RX ORDER — DEXAMETHASONE SODIUM PHOSPHATE 4 MG/ML
10 INJECTION, SOLUTION INTRA-ARTICULAR; INTRALESIONAL; INTRAMUSCULAR; INTRAVENOUS; SOFT TISSUE DAILY
Status: DISCONTINUED | OUTPATIENT
Start: 2022-01-01 | End: 2022-01-05

## 2022-01-01 RX ORDER — 0.9 % SODIUM CHLORIDE 0.9 %
500 INTRAVENOUS SOLUTION INTRAVENOUS ONCE
Status: COMPLETED | OUTPATIENT
Start: 2022-01-01 | End: 2022-01-01

## 2022-01-01 RX ORDER — SODIUM CHLORIDE 9 MG/ML
INJECTION, SOLUTION INTRAVENOUS CONTINUOUS
Status: ACTIVE | OUTPATIENT
Start: 2022-01-01 | End: 2022-01-01

## 2022-01-01 RX ADMIN — ENOXAPARIN SODIUM 30 MG: 100 INJECTION SUBCUTANEOUS at 00:22

## 2022-01-01 RX ADMIN — ENOXAPARIN SODIUM 30 MG: 100 INJECTION SUBCUTANEOUS at 12:57

## 2022-01-01 RX ADMIN — INSULIN LISPRO 9 UNITS: 100 INJECTION, SOLUTION INTRAVENOUS; SUBCUTANEOUS at 09:35

## 2022-01-01 RX ADMIN — TOCILIZUMAB 600 MG: 20 INJECTION, SOLUTION, CONCENTRATE INTRAVENOUS at 12:04

## 2022-01-01 RX ADMIN — ACETAMINOPHEN 650 MG: 325 TABLET ORAL at 06:35

## 2022-01-01 RX ADMIN — DEXAMETHASONE SODIUM PHOSPHATE 6 MG: 4 INJECTION, SOLUTION INTRA-ARTICULAR; INTRALESIONAL; INTRAMUSCULAR; INTRAVENOUS; SOFT TISSUE at 00:23

## 2022-01-01 RX ADMIN — INSULIN GLARGINE 40 UNITS: 100 INJECTION, SOLUTION SUBCUTANEOUS at 21:49

## 2022-01-01 RX ADMIN — FAMOTIDINE 20 MG: 10 INJECTION, SOLUTION INTRAVENOUS at 08:27

## 2022-01-01 RX ADMIN — DEXMEDETOMIDINE HYDROCHLORIDE 0.5 MCG/KG/HR: 4 INJECTION, SOLUTION INTRAVENOUS at 11:17

## 2022-01-01 RX ADMIN — CHLORHEXIDINE GLUCONATE 0.12% ORAL RINSE 15 ML: 1.2 LIQUID ORAL at 21:50

## 2022-01-01 RX ADMIN — ACETAMINOPHEN 650 MG: 325 TABLET ORAL at 11:21

## 2022-01-01 RX ADMIN — FUROSEMIDE 40 MG: 10 INJECTION, SOLUTION INTRAMUSCULAR; INTRAVENOUS at 08:27

## 2022-01-01 RX ADMIN — CHLORHEXIDINE GLUCONATE 0.12% ORAL RINSE 15 ML: 1.2 LIQUID ORAL at 08:32

## 2022-01-01 RX ADMIN — ACETAMINOPHEN 325 MG: 325 SUPPOSITORY RECTAL at 00:22

## 2022-01-01 RX ADMIN — ROSUVASTATIN 10 MG: 10 TABLET, FILM COATED ORAL at 21:49

## 2022-01-01 RX ADMIN — FAMOTIDINE 20 MG: 10 INJECTION, SOLUTION INTRAVENOUS at 21:49

## 2022-01-01 RX ADMIN — CEFTRIAXONE SODIUM 1000 MG: 1 INJECTION, POWDER, FOR SOLUTION INTRAMUSCULAR; INTRAVENOUS at 14:50

## 2022-01-01 RX ADMIN — FENTANYL CITRATE 25 MCG: 50 INJECTION INTRAMUSCULAR; INTRAVENOUS at 02:23

## 2022-01-01 RX ADMIN — SODIUM CHLORIDE 500 ML: 9 INJECTION, SOLUTION INTRAVENOUS at 04:41

## 2022-01-01 RX ADMIN — DEXMEDETOMIDINE HYDROCHLORIDE 0.5 MCG/KG/HR: 4 INJECTION, SOLUTION INTRAVENOUS at 04:49

## 2022-01-01 RX ADMIN — SODIUM CHLORIDE: 9 INJECTION, SOLUTION INTRAVENOUS at 05:51

## 2022-01-01 RX ADMIN — DEXAMETHASONE SODIUM PHOSPHATE 10 MG: 4 INJECTION, SOLUTION INTRAMUSCULAR; INTRAVENOUS at 11:13

## 2022-01-01 RX ADMIN — INSULIN LISPRO 12 UNITS: 100 INJECTION, SOLUTION INTRAVENOUS; SUBCUTANEOUS at 21:49

## 2022-01-01 RX ADMIN — INSULIN LISPRO 9 UNITS: 100 INJECTION, SOLUTION INTRAVENOUS; SUBCUTANEOUS at 13:29

## 2022-01-01 RX ADMIN — INSULIN LISPRO 6 UNITS: 100 INJECTION, SOLUTION INTRAVENOUS; SUBCUTANEOUS at 06:36

## 2022-01-01 RX ADMIN — INSULIN LISPRO 9 UNITS: 100 INJECTION, SOLUTION INTRAVENOUS; SUBCUTANEOUS at 16:35

## 2022-01-01 RX ADMIN — INSULIN GLARGINE 40 UNITS: 100 INJECTION, SOLUTION SUBCUTANEOUS at 08:42

## 2022-01-01 RX ADMIN — INSULIN LISPRO 9 UNITS: 100 INJECTION, SOLUTION INTRAVENOUS; SUBCUTANEOUS at 02:23

## 2022-01-01 RX ADMIN — ACETAMINOPHEN 650 MG: 325 TABLET ORAL at 02:23

## 2022-01-01 RX ADMIN — KETOROLAC TROMETHAMINE 15 MG: 30 INJECTION, SOLUTION INTRAMUSCULAR; INTRAVENOUS at 03:24

## 2022-01-01 RX ADMIN — VANCOMYCIN HYDROCHLORIDE 1500 MG: 5 INJECTION, POWDER, LYOPHILIZED, FOR SOLUTION INTRAVENOUS at 14:40

## 2022-01-01 RX ADMIN — ASPIRIN 81 MG CHEWABLE TABLET 81 MG: 81 TABLET CHEWABLE at 08:27

## 2022-01-01 ASSESSMENT — PAIN SCALES - GENERAL
PAINLEVEL_OUTOF10: 0
PAINLEVEL_OUTOF10: 5
PAINLEVEL_OUTOF10: 9
PAINLEVEL_OUTOF10: 0
PAINLEVEL_OUTOF10: 0

## 2022-01-01 ASSESSMENT — PULMONARY FUNCTION TESTS
PIF_VALUE: 22

## 2022-01-01 NOTE — PROGRESS NOTES
CRITICAL CARE PROGRESS NOTE      Patient:  Mandeep Roberson    Unit/Bed:4B-07/007-A  YOB: 1950  MRN: [id number]   PCP: Yohana Brown  Date of Admission: 12/18/2021  Chief Complaint:- Shortness of breath    Assessment and Plan:    1. Acute hypoxic respiratory failure secondary to COVID-19:  Intubated on 12/20/2021,Maintain lung protective strategies with peak pressure less than 35.  Continue to wean as tolerates  2. COVID-19:  Symptom onset 12/12/2021, continue dexamethasone. Completed baricitinib. Tocilizumab today for continued fevers and concern for cytokine storm. 3. ARDS: Status post pronation  4. Bacterial Pneumonia: Klebsiella oxytocin, Day #6 ceftriaxone  5. Mild hyperkalemia: Resolved  6. Diabetes mellitus, Type II: Uncontrolled on admission, continue sliding scale, and lantus BID  7. Fever of unknown origin: continue cooling blanket, acetaminophen, potentially secondary to MSSA bacteremia vs cytokine storm. Decreasing temperature throughout 1/1, now 98.4F. More alert this afternoon, able to follow commands, attempting to speak with ET tube in.  8. MSSA Bacteremia: Positive blood cultures on 1/1, started vancomycin, appreciate dosing recommendation from pharmacy. 9. Bradycardia: Monitor  10. Leukocytosis: likely secondary to MSSA bacteremia. 11. Obesity BMI 34.66  12.  CAD: stent in 2008    INITIAL H AND P AND ICU COURSE:  \"Kaleigh Sierra is a 62-year-old white female who presented to [facility] on 12/18/2021 with complaints of shortness of breath she has a past medical history lifetime non-smoker, CAD with stent placement in 2008, hypertension, diabetes mellitus, dyslipidemia.  Per report she presented to [facility] on 12/18 with complaints of hypoxia and shortness of breath.  She was initially placed on 4 L nasal cannula.  12/19 patient had persistent hypoxia and was transitioned to high flow nasal cannula.  On 12/20 patient was transitioned to BiPAP and transferred to [facility] ICU for intubation. \"    1/1: Decreasing fever, now 98.4F. Alert, following commands, attempting to speak with ET tube in. MSSA bacteremia noted on blood culture, started vancomycin. Past Medical History:  Per HPI  Family History:  No known family hisotry. Social History:  Lifetime non-smoker, denies alcohol or drug use. ROS   Sedated on mechanical ventilation    Scheduled Meds:   dexamethasone  10 mg IntraVENous Daily    vancomycin (VANCOCIN) intermittent dosing (placeholder)   Other RX Placeholder    [START ON 1/2/2022] vancomycin  1,000 mg IntraVENous Q12H    rosuvastatin  10 mg Oral Daily    insulin glargine  40 Units SubCUTAneous BID    furosemide  40 mg IntraVENous Daily    cefTRIAXone (ROCEPHIN) IV  1,000 mg IntraVENous Q24H    insulin lispro  0-18 Units SubCUTAneous Q4H    chlorhexidine  15 mL Mouth/Throat BID    famotidine (PEPCID) injection  20 mg IntraVENous BID    aspirin  81 mg Per NG tube Daily    enoxaparin  30 mg SubCUTAneous Q12H     Continuous Infusions:   dexmedetomidine 0.4 mcg/kg/hr (01/01/22 1700)    sodium chloride      dextrose         PHYSICAL EXAMINATION:  Vitals:    01/01/22 1850   BP: 104/69   Pulse: 60   Resp: 18   Temp: 98.4 °F (36.9 °C)   SpO2: 95%       Body mass index is 35.54 kg/m². General:   Minimally responsive in am, Alert in PM attempting to speak, following commands. HEENT:  normocephalic and atraumatic. No scleral icterus. PERR  Neck: supple. No Thyromegaly. Lungs: clear to auscultation. No retractions  Cardiac: RRR. No JVD. Abdomen: soft. Nontender. Extremities:  No clubbing, cyanosis, or edema x 4. Vasculature: capillary refill < 3 seconds. Palpable dorsalis pedis pulses. Skin:  warm and dry. Psych:  Alert and oriented x3. Affect appropriate  Lymph:  No supraclavicular adenopathy. Neurologic:  No focal deficit. No seizures.     Data: (All radiographs, tracings, PFTs, and imaging are personally viewed and interpreted unless otherwise noted). Seen with multidisciplinary ICU team   Meets Continued ICU Level Care Criteria:    [x] Yes   [] No - Transfer Planned to listed location:  [] HOSPITALIST CONTACTED-      Case and plan discussed with Dr. Noemi Mortimer.       Electronically signed by Wolf Serrano MD   PGY-1 Emergency Medicine

## 2022-01-01 NOTE — PROGRESS NOTES
1020-Patient has gram positive cocci in clusters in anaerobic bottle drawn from peripheral vein. Called Dr. Melody Ziegler and made him aware of this.

## 2022-01-01 NOTE — PROGRESS NOTES
[address] [facility] Pharmacokinetic Monitoring Service - Vancomycin     Aries Arreola is a 73 y.o. female starting on vancomycin therapy for bacteremia. Pharmacy consulted by Dr. Shruthi Jones for monitoring and adjustment. Target Concentration: Goal AUC/DOMO 400-600 mg*hr/L    Additional Antimicrobials: rocephin    Pertinent Laboratory Values: Wt Readings from Last 1 Encounters:   01/01/22 194 lb 4.8 oz (88.1 kg)     Temp Readings from Last 1 Encounters:   01/01/22 101.9 °F (38.8 °C) (Rectal)     Estimated Creatinine Clearance: 68 mL/min (based on SCr of 0.8 mg/dL). Recent Labs     12/31/21  0400 01/01/22  0314   CREATININE 0.7 0.8   WBC 16.6* 10.2     Procalcitonin: 0.06    Pertinent Cultures:  Culture Date Source Results   12/31/2021 BC1 GPC clusters   12/31/2021 BC2 ngtd   12/31/2021 ETT No bacteria, PCR negative   MRSA Nasal Swab: N/A. Non-respiratory infection.     Plan:  Dosing recommendations based on Bayesian software  Start vancomycin 1500mg x 1, then vancomycin 1000mg q12h  Anticipated AUC of 534 and trough concentration of 17.5 at steady state  Renal labs as indicated   Vancomycin concentration ordered for 1/2/22 @ 1000   Pharmacy will continue to monitor patient and adjust therapy as indicated    Thank you for the consult,  Theodor Blizzard, [facility]  1/1/2022 12:05 PM

## 2022-01-02 LAB
ANION GAP SERPL CALCULATED.3IONS-SCNC: 14 MEQ/L (ref 8–16)
BLOOD CULTURE, ROUTINE: ABNORMAL
BLOOD CULTURE, ROUTINE: ABNORMAL
BUN BLDV-MCNC: 40 MG/DL (ref 7–22)
CALCIUM SERPL-MCNC: 8.2 MG/DL (ref 8.5–10.5)
CHLORIDE BLD-SCNC: 96 MEQ/L (ref 98–111)
CO2: 25 MEQ/L (ref 23–33)
CREAT SERPL-MCNC: 0.5 MG/DL (ref 0.4–1.2)
ERYTHROCYTE [DISTWIDTH] IN BLOOD BY AUTOMATED COUNT: 11.9 % (ref 11.5–14.5)
ERYTHROCYTE [DISTWIDTH] IN BLOOD BY AUTOMATED COUNT: 38 FL (ref 35–45)
GFR SERPL CREATININE-BSD FRML MDRD: > 90 ML/MIN/1.73M2
GLUCOSE BLD-MCNC: 104 MG/DL (ref 70–108)
GLUCOSE BLD-MCNC: 112 MG/DL (ref 70–108)
GLUCOSE BLD-MCNC: 207 MG/DL (ref 70–108)
GLUCOSE BLD-MCNC: 238 MG/DL (ref 70–108)
GLUCOSE BLD-MCNC: 266 MG/DL (ref 70–108)
GLUCOSE BLD-MCNC: 340 MG/DL (ref 70–108)
GLUCOSE BLD-MCNC: 362 MG/DL (ref 70–108)
GLUCOSE BLD-MCNC: 391 MG/DL (ref 70–108)
GRAM STAIN RESULT: ABNORMAL
HCT VFR BLD CALC: 36.5 % (ref 37–47)
HEMOGLOBIN: 12.4 GM/DL (ref 12–16)
MAGNESIUM: 2.1 MG/DL (ref 1.6–2.4)
MCH RBC QN AUTO: 29.8 PG (ref 26–33)
MCHC RBC AUTO-ENTMCNC: 34 GM/DL (ref 32.2–35.5)
MCV RBC AUTO: 87.7 FL (ref 81–99)
ORGANISM: ABNORMAL
PLATELET # BLD: 147 THOU/MM3 (ref 130–400)
PMV BLD AUTO: 11 FL (ref 9.4–12.4)
POTASSIUM REFLEX MAGNESIUM: 3.5 MEQ/L (ref 3.5–5.2)
RBC # BLD: 4.16 MILL/MM3 (ref 4.2–5.4)
RESPIRATORY CULTURE: ABNORMAL
SODIUM BLD-SCNC: 135 MEQ/L (ref 135–145)
VANCOMYCIN RANDOM: 8.5 UG/ML (ref 0.1–39.9)
WBC # BLD: 10 THOU/MM3 (ref 4.8–10.8)

## 2022-01-02 PROCEDURE — 6370000000 HC RX 637 (ALT 250 FOR IP): Performed by: PHYSICIAN ASSISTANT

## 2022-01-02 PROCEDURE — 83735 ASSAY OF MAGNESIUM: CPT

## 2022-01-02 PROCEDURE — 87801 DETECT AGNT MULT DNA AMPLI: CPT

## 2022-01-02 PROCEDURE — 2580000003 HC RX 258: Performed by: STUDENT IN AN ORGANIZED HEALTH CARE EDUCATION/TRAINING PROGRAM

## 2022-01-02 PROCEDURE — 85027 COMPLETE CBC AUTOMATED: CPT

## 2022-01-02 PROCEDURE — 6360000002 HC RX W HCPCS: Performed by: PHYSICIAN ASSISTANT

## 2022-01-02 PROCEDURE — 94003 VENT MGMT INPAT SUBQ DAY: CPT

## 2022-01-02 PROCEDURE — 6360000002 HC RX W HCPCS: Performed by: STUDENT IN AN ORGANIZED HEALTH CARE EDUCATION/TRAINING PROGRAM

## 2022-01-02 PROCEDURE — 87147 CULTURE TYPE IMMUNOLOGIC: CPT

## 2022-01-02 PROCEDURE — 6360000002 HC RX W HCPCS: Performed by: NURSE PRACTITIONER

## 2022-01-02 PROCEDURE — 6370000000 HC RX 637 (ALT 250 FOR IP): Performed by: NURSE PRACTITIONER

## 2022-01-02 PROCEDURE — 99233 SBSQ HOSP IP/OBS HIGH 50: CPT | Performed by: INTERNAL MEDICINE

## 2022-01-02 PROCEDURE — 2100000000 HC CCU R&B

## 2022-01-02 PROCEDURE — 2500000003 HC RX 250 WO HCPCS: Performed by: INTERNAL MEDICINE

## 2022-01-02 PROCEDURE — 82948 REAGENT STRIP/BLOOD GLUCOSE: CPT

## 2022-01-02 PROCEDURE — 2500000003 HC RX 250 WO HCPCS: Performed by: NURSE PRACTITIONER

## 2022-01-02 PROCEDURE — 80202 ASSAY OF VANCOMYCIN: CPT

## 2022-01-02 PROCEDURE — 87040 BLOOD CULTURE FOR BACTERIA: CPT

## 2022-01-02 PROCEDURE — 6370000000 HC RX 637 (ALT 250 FOR IP): Performed by: STUDENT IN AN ORGANIZED HEALTH CARE EDUCATION/TRAINING PROGRAM

## 2022-01-02 PROCEDURE — 36415 COLL VENOUS BLD VENIPUNCTURE: CPT

## 2022-01-02 PROCEDURE — 80048 BASIC METABOLIC PNL TOTAL CA: CPT

## 2022-01-02 PROCEDURE — 2580000003 HC RX 258: Performed by: NURSE PRACTITIONER

## 2022-01-02 PROCEDURE — 36592 COLLECT BLOOD FROM PICC: CPT

## 2022-01-02 RX ORDER — INSULIN GLARGINE 100 [IU]/ML
20 INJECTION, SOLUTION SUBCUTANEOUS ONCE
Status: COMPLETED | OUTPATIENT
Start: 2022-01-02 | End: 2022-01-02

## 2022-01-02 RX ORDER — MORPHINE SULFATE 2 MG/ML
2 INJECTION, SOLUTION INTRAMUSCULAR; INTRAVENOUS EVERY 4 HOURS PRN
Status: DISPENSED | OUTPATIENT
Start: 2022-01-02 | End: 2022-01-04

## 2022-01-02 RX ADMIN — SODIUM CHLORIDE, PRESERVATIVE FREE 10 ML: 5 INJECTION INTRAVENOUS at 08:24

## 2022-01-02 RX ADMIN — DEXMEDETOMIDINE HYDROCHLORIDE 0.5 MCG/KG/HR: 4 INJECTION, SOLUTION INTRAVENOUS at 00:02

## 2022-01-02 RX ADMIN — FENTANYL CITRATE 25 MCG: 50 INJECTION INTRAMUSCULAR; INTRAVENOUS at 14:48

## 2022-01-02 RX ADMIN — ENOXAPARIN SODIUM 30 MG: 100 INJECTION SUBCUTANEOUS at 02:50

## 2022-01-02 RX ADMIN — ENOXAPARIN SODIUM 30 MG: 100 INJECTION SUBCUTANEOUS at 14:48

## 2022-01-02 RX ADMIN — POLYETHYLENE GLYCOL 3350 17 G: 17 POWDER, FOR SOLUTION ORAL at 08:24

## 2022-01-02 RX ADMIN — FAMOTIDINE 20 MG: 10 INJECTION, SOLUTION INTRAVENOUS at 08:24

## 2022-01-02 RX ADMIN — INSULIN LISPRO 9 UNITS: 100 INJECTION, SOLUTION INTRAVENOUS; SUBCUTANEOUS at 09:44

## 2022-01-02 RX ADMIN — FENTANYL CITRATE 25 MCG: 50 INJECTION INTRAMUSCULAR; INTRAVENOUS at 11:04

## 2022-01-02 RX ADMIN — INSULIN LISPRO 15 UNITS: 100 INJECTION, SOLUTION INTRAVENOUS; SUBCUTANEOUS at 02:50

## 2022-01-02 RX ADMIN — VANCOMYCIN HYDROCHLORIDE 1000 MG: 1 INJECTION, POWDER, LYOPHILIZED, FOR SOLUTION INTRAVENOUS at 02:52

## 2022-01-02 RX ADMIN — ASPIRIN 81 MG CHEWABLE TABLET 81 MG: 81 TABLET CHEWABLE at 08:24

## 2022-01-02 RX ADMIN — INSULIN GLARGINE 40 UNITS: 100 INJECTION, SOLUTION SUBCUTANEOUS at 08:26

## 2022-01-02 RX ADMIN — CEFTRIAXONE SODIUM 1000 MG: 1 INJECTION, POWDER, FOR SOLUTION INTRAMUSCULAR; INTRAVENOUS at 14:46

## 2022-01-02 RX ADMIN — DEXMEDETOMIDINE HYDROCHLORIDE 0.3 MCG/KG/HR: 4 INJECTION, SOLUTION INTRAVENOUS at 09:49

## 2022-01-02 RX ADMIN — DEXAMETHASONE SODIUM PHOSPHATE 10 MG: 4 INJECTION, SOLUTION INTRAMUSCULAR; INTRAVENOUS at 08:26

## 2022-01-02 RX ADMIN — VANCOMYCIN HYDROCHLORIDE 1000 MG: 1 INJECTION, POWDER, LYOPHILIZED, FOR SOLUTION INTRAVENOUS at 14:52

## 2022-01-02 RX ADMIN — INSULIN LISPRO 6 UNITS: 100 INJECTION, SOLUTION INTRAVENOUS; SUBCUTANEOUS at 15:15

## 2022-01-02 RX ADMIN — INSULIN LISPRO 12 UNITS: 100 INJECTION, SOLUTION INTRAVENOUS; SUBCUTANEOUS at 06:10

## 2022-01-02 RX ADMIN — CHLORHEXIDINE GLUCONATE 0.12% ORAL RINSE 15 ML: 1.2 LIQUID ORAL at 21:10

## 2022-01-02 RX ADMIN — FAMOTIDINE 20 MG: 10 INJECTION, SOLUTION INTRAVENOUS at 20:44

## 2022-01-02 RX ADMIN — INSULIN GLARGINE 20 UNITS: 100 INJECTION, SOLUTION SUBCUTANEOUS at 02:49

## 2022-01-02 RX ADMIN — CHLORHEXIDINE GLUCONATE 0.12% ORAL RINSE 15 ML: 1.2 LIQUID ORAL at 08:24

## 2022-01-02 RX ADMIN — FUROSEMIDE 40 MG: 10 INJECTION, SOLUTION INTRAMUSCULAR; INTRAVENOUS at 08:24

## 2022-01-02 ASSESSMENT — PAIN DESCRIPTION - ONSET: ONSET: ON-GOING

## 2022-01-02 ASSESSMENT — PULMONARY FUNCTION TESTS: PIF_VALUE: 20

## 2022-01-02 ASSESSMENT — PAIN DESCRIPTION - LOCATION: LOCATION: BACK

## 2022-01-02 ASSESSMENT — PAIN DESCRIPTION - FREQUENCY: FREQUENCY: CONTINUOUS

## 2022-01-02 ASSESSMENT — PAIN SCALES - GENERAL
PAINLEVEL_OUTOF10: 7
PAINLEVEL_OUTOF10: 2
PAINLEVEL_OUTOF10: 7

## 2022-01-02 ASSESSMENT — PAIN DESCRIPTION - PAIN TYPE: TYPE: ACUTE PAIN

## 2022-01-02 ASSESSMENT — PAIN DESCRIPTION - DESCRIPTORS: DESCRIPTORS: ACHING

## 2022-01-02 NOTE — FLOWSHEET NOTE
01/02/22 0124   Provider Notification   Reason for Communication Evaluate  (chem [id number])   Provider Name Dr. Wing Benavides   Provider Notification Resident   Method of Communication Call   Response See orders  (increase lantus)   Notification Time 0125       Patient chem 326 at 2130. This RN gave 40u Lantus and 12u Humalog per orders. Recheck resulted in chem 362. Notified Dr. Wing Benavides, who recommended an increase in the lantus dosing.

## 2022-01-02 NOTE — PROGRESS NOTES
1600-patients  was in to visit to, gave update to him at this time. Also, spoke with patients daughter, Andria Burns, and gave her update on patients status.

## 2022-01-02 NOTE — PROGRESS NOTES
This RN updated patient's daughter Cecy Felt and  Gavi Mood this morning. All questions were answered and concerns addressed at this time.

## 2022-01-02 NOTE — PROGRESS NOTES
CRITICAL CARE PROGRESS NOTE      Patient:  Tone Vera    Unit/Bed:4B-07/007-A  YOB: 1950  MRN: [id number]   PCP: Jesse Jasso  Date of Admission: 12/18/2021  Chief Complaint:- Acute hypoxic respiratory failure    Assessment and Plan:      1. Acute hypoxic respiratory failure: Patient initially presented on nasal cannula escalated to high flow and BiPAP.  She was intubated on 12/20/2021. Plans for extubation 1/2/2022   2. AROFJ-81: MUPDB of symptoms 12/12/2021.  Positive on 12/17, continue Decadron s/p  baricitinib. 3. Bacterial pneumonia: Respiratory culture positive for Klebsiella oxytocin, sensitivity to Rocephin.  Initiate Rocephin day #7  4. Mild hyperkalemia: Resolved; mild, monitor patient on insulin drip for uncontrolled diabetes. 5. Diabetes mellitus type 2 uncontrolled: s/p insulin drip.  A1c noted at 9.9.  On sliding scale insulin and Humalog, lantus done adjusted to 40 units twice daily  6. Fever of unknown origin:  Resolved;  7. Cardiomyopathy: Ejection fraction noted from 30 to 35%.  No noted echo at [facility] are in care everywhere. Ana Maria Weathers does have history of stent placement in 2008. Marquis Delgado if this is new onset cardiomyopathy or pre-existing.  Patient is noted be bradycardic requiring dopamine.  Cardiology consulted.  On IV lasix   8. Hypertension: Listed in history, currently normotensive. Monitor   9. Bradycardia: Resolved;  requiring dopamine.  Cardiology consulted for cardiomyopathy.    10. Leukocytosis: resolved; WBC 10.0, Continue ATB therapy  11. Obesity: BMI 35.54  12.  CAD: history of stent placement, 2008.      INITIAL H AND P AND ICU COURSE:  Kaleigh Sierra is a 68-year-old white female who presented to [facility] on 12/18/2021 with complaints of shortness of breath she has a past medical history lifetime non-smoker, CAD with stent placement in 2008, hypertension, diabetes mellitus, dyslipidemia.  Per report she presented to [facility] on 12/18 with complaints of hypoxia and shortness of breath.  She was initially placed on 4 L nasal cannula.  12/19 patient had persistent hypoxia and was transitioned to high flow nasal cannula.  On 12/20 patient was transitioned to BiPAP and transferred to [facility] ICU for intubation.     Past Medical History: Per HPI  Family History: No known family history  Social History: Lifetime non-smoker, denies alcohol or drug use.     ROS   Sedated on mechanical ventilation    Scheduled Meds:   dexamethasone  10 mg IntraVENous Daily    vancomycin (VANCOCIN) intermittent dosing (placeholder)   Other RX Placeholder    vancomycin  1,000 mg IntraVENous Q12H    rosuvastatin  10 mg Oral Daily    insulin glargine  40 Units SubCUTAneous BID    furosemide  40 mg IntraVENous Daily    cefTRIAXone (ROCEPHIN) IV  1,000 mg IntraVENous Q24H    insulin lispro  0-18 Units SubCUTAneous Q4H    chlorhexidine  15 mL Mouth/Throat BID    famotidine (PEPCID) injection  20 mg IntraVENous BID    aspirin  81 mg Per NG tube Daily    enoxaparin  30 mg SubCUTAneous Q12H     Continuous Infusions:   dexmedetomidine 0.2 mcg/kg/hr (01/02/22 1457)    sodium chloride      dextrose         PHYSICAL EXAMINATION:  T:  97.6. P:  51. RR:  15. B/P:  105/73. FiO2:  40. O2 Sat:  95.  I/O:  2251/2050  Body mass index is 35.54 kg/m². PC: 12/6: TV: 421: RRTotal: 22: Ti:1 sec  General: Acute on chronically ill-appearing white female  HEENT:  normocephalic and atraumatic. No scleral icterus. PERR  Neck: supple. No Thyromegaly. Lungs: clear to auscultation. No retractions  Cardiac: RRR. No JVD. Abdomen: soft. Nontender. Extremities:  No clubbing, cyanosis, or edema x 4. Vasculature: capillary refill < 3 seconds. Palpable dorsalis pedis pulses. Skin:  warm and dry. Cool left lower extremity  Psych: Sedated on mechanical ventilation  Lymph:  No supraclavicular adenopathy. Neurologic:  No focal deficit. No seizures.     Data: (All radiographs, tracings, PFTs, and imaging are personally viewed and interpreted unless otherwise noted).  Sodium 135, potassium 3.5, chloride 96, CO2 25, BUN 40, creatinine 0.5, anion gap 14.0, glucose 238   WBC 10.0, hemoglobin 12.4, hematocrit 36.5, platelet count 719   Telemetry shows normal sinus rhythm   Pneumonia panel negative   Chest x-ray 1/1/2022 reports stable mild bilateral patchy opacities      Meets Continued ICU Level Care Criteria:    [x] Yes   [] No - Transfer Planned to listed location:  [] HOSPITALIST CONTACTED- DR     Case and plan discussed with Dr. Yoel López. Electronically signed by Franko Giordano.  WYATT Castle - CNP  CRITICAL CARE SPECIALIST

## 2022-01-02 NOTE — PROGRESS NOTES
[address] [facility] Pharmacokinetic Monitoring Service - Vancomycin    Consulting Provider: Dr. Yoana Buckley   Indication: positive blood culture (1 of 2)  Target Concentration: Goal AUC/DOMO 400-600 mg*hr/L  Day of Therapy: 2  Additional Antimicrobials: rocephin    Pertinent Laboratory Values: Wt Readings from Last 1 Encounters:   01/01/22 194 lb 4.8 oz (88.1 kg)     Temp Readings from Last 1 Encounters:   01/02/22 100 °F (37.8 °C) (Rectal)     Estimated Creatinine Clearance: 109 mL/min (based on SCr of 0.5 mg/dL). Recent Labs     01/01/22  0314 01/02/22  0400   CREATININE 0.8 0.5   WBC 10.2 10.0     Procalcitonin: 0.06  Pertinent Cultures:  Culture Date Source Results   12/31/2021 BC1 GPC clusters   12/31/2021 BC2 ngtd   12/31/2021 ETT No bacteria, PCR negative     MRSA Nasal Swab: N/A. Non-respiratory infection.     Recent vancomycin administrations                     vancomycin 1000 mg IVPB in 250 mL D5W addavial (mg) 1,000 mg New Bag 01/02/22 0252    vancomycin (VANCOCIN) 1,500 mg in dextrose 5 % 500 mL IVPB (mg) 1,500 mg New Bag 01/01/22 1440    vancomycin (VANCOCIN) intermittent dosing (placeholder) ()  Given 01/01/22 1424                    Assessment:  Date/Time Current Dose Concentration Timing of Concentration (h) AUC   1/2/22 @ 1115 1000mg q12h 8.5 mcg/mL 8h 23min post dose 332   Note: Serum concentrations collected for AUC dosing may appear elevated if collected in close proximity to the dose administered, this is not necessarily an indication of toxicity    Plan:  Current dosing regimen is sub-therapeutic  Increase dose to 1250mg q12h with  estimated   Pharmacy will continue to monitor patient and adjust therapy as indicated    Thank you for the consult,  DARLINE Graham [facility]  1/2/2022 1:43 PM

## 2022-01-03 LAB
ANION GAP SERPL CALCULATED.3IONS-SCNC: 11 MEQ/L (ref 8–16)
BASOPHILS # BLD: 0.2 %
BASOPHILS ABSOLUTE: 0 THOU/MM3 (ref 0–0.1)
BUN BLDV-MCNC: 39 MG/DL (ref 7–22)
CALCIUM SERPL-MCNC: 8.4 MG/DL (ref 8.5–10.5)
CHLORIDE BLD-SCNC: 96 MEQ/L (ref 98–111)
CO2: 27 MEQ/L (ref 23–33)
CREAT SERPL-MCNC: 0.5 MG/DL (ref 0.4–1.2)
EOSINOPHIL # BLD: 0 %
EOSINOPHILS ABSOLUTE: 0 THOU/MM3 (ref 0–0.4)
ERYTHROCYTE [DISTWIDTH] IN BLOOD BY AUTOMATED COUNT: 11.9 % (ref 11.5–14.5)
ERYTHROCYTE [DISTWIDTH] IN BLOOD BY AUTOMATED COUNT: 35.9 FL (ref 35–45)
GFR SERPL CREATININE-BSD FRML MDRD: > 90 ML/MIN/1.73M2
GLUCOSE BLD-MCNC: 73 MG/DL (ref 70–108)
GLUCOSE BLD-MCNC: 79 MG/DL (ref 70–108)
GLUCOSE BLD-MCNC: 81 MG/DL (ref 70–108)
GLUCOSE BLD-MCNC: 85 MG/DL (ref 70–108)
GLUCOSE BLD-MCNC: 88 MG/DL (ref 70–108)
GLUCOSE BLD-MCNC: 89 MG/DL (ref 70–108)
GLUCOSE BLD-MCNC: 99 MG/DL (ref 70–108)
HCT VFR BLD CALC: 38.1 % (ref 37–47)
HEMOGLOBIN: 13.2 GM/DL (ref 12–16)
IMMATURE GRANS (ABS): 0.21 THOU/MM3 (ref 0–0.07)
IMMATURE GRANULOCYTES: 1.3 %
LYMPHOCYTES # BLD: 6.3 %
LYMPHOCYTES ABSOLUTE: 1 THOU/MM3 (ref 1–4.8)
MCH RBC QN AUTO: 29.3 PG (ref 26–33)
MCHC RBC AUTO-ENTMCNC: 34.6 GM/DL (ref 32.2–35.5)
MCV RBC AUTO: 84.7 FL (ref 81–99)
MONOCYTES # BLD: 7.4 %
MONOCYTES ABSOLUTE: 1.2 THOU/MM3 (ref 0.4–1.3)
NUCLEATED RED BLOOD CELLS: 0 /100 WBC
PLATELET # BLD: 186 THOU/MM3 (ref 130–400)
PMV BLD AUTO: 11 FL (ref 9.4–12.4)
POTASSIUM SERPL-SCNC: 3.5 MEQ/L (ref 3.5–5.2)
RBC # BLD: 4.5 MILL/MM3 (ref 4.2–5.4)
SEG NEUTROPHILS: 84.8 %
SEGMENTED NEUTROPHILS ABSOLUTE COUNT: 13.3 THOU/MM3 (ref 1.8–7.7)
SODIUM BLD-SCNC: 134 MEQ/L (ref 135–145)
WBC # BLD: 15.7 THOU/MM3 (ref 4.8–10.8)

## 2022-01-03 PROCEDURE — 2580000003 HC RX 258: Performed by: NURSE PRACTITIONER

## 2022-01-03 PROCEDURE — 6370000000 HC RX 637 (ALT 250 FOR IP): Performed by: NURSE PRACTITIONER

## 2022-01-03 PROCEDURE — 97110 THERAPEUTIC EXERCISES: CPT

## 2022-01-03 PROCEDURE — 6360000002 HC RX W HCPCS: Performed by: STUDENT IN AN ORGANIZED HEALTH CARE EDUCATION/TRAINING PROGRAM

## 2022-01-03 PROCEDURE — 2580000003 HC RX 258: Performed by: STUDENT IN AN ORGANIZED HEALTH CARE EDUCATION/TRAINING PROGRAM

## 2022-01-03 PROCEDURE — 1200000003 HC TELEMETRY R&B

## 2022-01-03 PROCEDURE — 97530 THERAPEUTIC ACTIVITIES: CPT

## 2022-01-03 PROCEDURE — 80048 BASIC METABOLIC PNL TOTAL CA: CPT

## 2022-01-03 PROCEDURE — 82948 REAGENT STRIP/BLOOD GLUCOSE: CPT

## 2022-01-03 PROCEDURE — 85025 COMPLETE CBC W/AUTO DIFF WBC: CPT

## 2022-01-03 PROCEDURE — 99232 SBSQ HOSP IP/OBS MODERATE 35: CPT | Performed by: INTERNAL MEDICINE

## 2022-01-03 PROCEDURE — 6360000002 HC RX W HCPCS: Performed by: PHYSICIAN ASSISTANT

## 2022-01-03 PROCEDURE — 2500000003 HC RX 250 WO HCPCS: Performed by: NURSE PRACTITIONER

## 2022-01-03 PROCEDURE — 6360000002 HC RX W HCPCS: Performed by: NURSE PRACTITIONER

## 2022-01-03 PROCEDURE — 1200000000 HC SEMI PRIVATE

## 2022-01-03 PROCEDURE — 92610 EVALUATE SWALLOWING FUNCTION: CPT

## 2022-01-03 PROCEDURE — 2700000000 HC OXYGEN THERAPY PER DAY

## 2022-01-03 PROCEDURE — 97163 PT EVAL HIGH COMPLEX 45 MIN: CPT

## 2022-01-03 RX ADMIN — MORPHINE SULFATE 2 MG: 2 INJECTION, SOLUTION INTRAMUSCULAR; INTRAVENOUS at 17:02

## 2022-01-03 RX ADMIN — ROSUVASTATIN 10 MG: 10 TABLET, FILM COATED ORAL at 22:00

## 2022-01-03 RX ADMIN — SODIUM CHLORIDE, PRESERVATIVE FREE 10 ML: 5 INJECTION INTRAVENOUS at 08:57

## 2022-01-03 RX ADMIN — FAMOTIDINE 20 MG: 10 INJECTION, SOLUTION INTRAVENOUS at 21:35

## 2022-01-03 RX ADMIN — DEXAMETHASONE SODIUM PHOSPHATE 10 MG: 4 INJECTION, SOLUTION INTRAMUSCULAR; INTRAVENOUS at 08:57

## 2022-01-03 RX ADMIN — ASPIRIN 81 MG CHEWABLE TABLET 81 MG: 81 TABLET CHEWABLE at 13:15

## 2022-01-03 RX ADMIN — SODIUM CHLORIDE 25 ML: 9 INJECTION, SOLUTION INTRAVENOUS at 02:27

## 2022-01-03 RX ADMIN — ENOXAPARIN SODIUM 30 MG: 100 INJECTION SUBCUTANEOUS at 13:16

## 2022-01-03 RX ADMIN — VANCOMYCIN HYDROCHLORIDE 1000 MG: 1 INJECTION, POWDER, LYOPHILIZED, FOR SOLUTION INTRAVENOUS at 02:30

## 2022-01-03 RX ADMIN — CHLORHEXIDINE GLUCONATE 0.12% ORAL RINSE 15 ML: 1.2 LIQUID ORAL at 21:44

## 2022-01-03 RX ADMIN — ENOXAPARIN SODIUM 30 MG: 100 INJECTION SUBCUTANEOUS at 00:13

## 2022-01-03 RX ADMIN — CEFTRIAXONE SODIUM 1000 MG: 1 INJECTION, POWDER, FOR SOLUTION INTRAMUSCULAR; INTRAVENOUS at 14:52

## 2022-01-03 RX ADMIN — FAMOTIDINE 20 MG: 10 INJECTION, SOLUTION INTRAVENOUS at 08:57

## 2022-01-03 RX ADMIN — CHLORHEXIDINE GLUCONATE 0.12% ORAL RINSE 15 ML: 1.2 LIQUID ORAL at 08:57

## 2022-01-03 RX ADMIN — FUROSEMIDE 40 MG: 10 INJECTION, SOLUTION INTRAMUSCULAR; INTRAVENOUS at 08:56

## 2022-01-03 ASSESSMENT — PAIN SCALES - GENERAL
PAINLEVEL_OUTOF10: 0
PAINLEVEL_OUTOF10: 10
PAINLEVEL_OUTOF10: 0
PAINLEVEL_OUTOF10: 0
PAINLEVEL_OUTOF10: 10

## 2022-01-03 NOTE — PLAN OF CARE
Transfer of Care  Current Unit: 4B  Current Attending: Dr. Jennifer Washburn    Transferring To: [address] [place] [address] Attending: Cornelia Warner NP    Patient signed out to Cornelia Warner NP by phone.     Luis M Parisi MD

## 2022-01-03 NOTE — PROGRESS NOTES
[address] [address]  INPATIENT PHYSICAL THERAPY  EVALUATION  STR MED SURG 8B - 8B-37/037-A    Time In: 3005  Time Out: 0633  Timed Code Treatment Minutes: 38 Minutes  Minutes: 49        Date: 1/3/2022  Patient Name: Adelso Lozano,  Gender:  female        MRN: [id number]  : 1950  (73 y.o.)      Referring Practitioner: Ana Hope CNP  Diagnosis: Hypoxia  Additional Pertinent Hx: Ann Dominguez is a 82-year-old white female who presented to [facility] on 2021 with complaints of shortness of breath she has a past medical history lifetime non-smoker, CAD with stent placement in , hypertension, diabetes mellitus, dyslipidemia. Per report she presented to [facility] on  with complaints of hypoxia and shortness of breath. She was initially placed on 4 L nasal cannula.  patient had persistent hypoxia and was transitioned to high flow nasal cannula. On  patient was transitioned to BiPAP and transferred to [facility] ICU for intubation. Pt extubated . Restrictions/Precautions:  Restrictions/Precautions: Fall Risk,Isolation    Subjective:  Chart Reviewed: Yes  Patient assessed for rehabilitation services?: Yes  Family / Caregiver Present: Yes (daughter)  Subjective: RN approved session, pt is supine in bed, agreeable to PT, daughter present. Pt on 4 L O2, confused and hallucinating at times. Pt to have MBS tomorrow. General:  Overall Orientation Status: Impaired  Orientation Level: Oriented to place,Oriented to situation,Oriented to person    Vision: Within Functional Limits    Hearing: Within functional limits       Pain: c/o 10/10: sciatica pain, both legs    Vitals: Oxygen: remains >92% on 4 L O2    Social/Functional History:    Lives With: Spouse  Type of Home: House  Home Layout: One level  Home Equipment:  (None)      Ambulation Assistance: Independent  Transfer Assistance: Independent    Active :  Yes     Additional Comments: Pt fully I prior to admission, 6 kids    OBJECTIVE:  Range of Motion:  Bilateral Lower Extremity: WFL    Strength:  Bilateral Lower Extremity: Impaired - mod-max A to perform LE exercises,  poor B  strength, max A for B shoulder flexion     Bed Mobility:  Rolling to Left: Dependent , x 2 trials, pt unable to assist due to significant weakness    Exercise:  Patient was guided in 1 set(s) 10 reps of exercise to both lower extremities. Ankle pumps, Quad sets, Heelslides and Hip abduction/adduction. Exercises were completed for increased independence with functional mobility. Mod-max A to complete. Functional Outcome Measures: Completed  AM-PAC Inpatient Mobility Raw Score : 6  -PAC Inpatient T-Scale Score : 23.55    ASSESSMENT:  Activity Tolerance:  Patient tolerance of  treatment: fair. Treatment Initiated: Treatment and education initiated within context of evaluation. Evaluation time included review of current medical information, gathering information related to past medical, social and functional history, completion of standardized testing, formal and informal observation of tasks, assessment of data and development of plan of care and goals. Treatment time included skilled education and facilitation of tasks to increase safety and independence with functional mobility for improved independence and quality of life. Discussed POC, recommendation for inpatient therapies at [place]. Assessment: Body structures, Functions, Activity limitations: Decreased functional mobility ,Decreased balance,Decreased cognition,Decreased endurance,Decreased strength  Assessment: Pt tolerates session fair, limited by weakness and deconditioning, was intubated x ~2 weeks. PT to continue to progress strength and functional mobility.   Prognosis: Good    REQUIRES PT FOLLOW UP: Yes    Discharge Recommendations:  Discharge Recommendations: Continue to assess pending progress,Patient would benefit from continued therapy after discharge    Patient Education:  PT Education: Claribel Schwartz [facility],General Safety    Equipment Recommendations: Other: will monitor for needs pending progress and DC destination    Plan:  Times per week: 5x C  Current Treatment Recommendations: Strengthening,Positioning,Patient/Caregiver Education & Training,Safety Education & Training    Goals:  Patient goals : to get stronger  Short term goals  Time Frame for Short term goals: by discharge  Short term goal 1: Pt to roll L and R In bed with mod A to improve bed mobility. Short term goal 2: PT to assess OOB mobility. Long term goals  Time Frame for Long term goals : NA due to short length of stay. Following session, patient left in safe position with all fall risk precautions in place.

## 2022-01-03 NOTE — CARE COORDINATION
1/3/22, 1:23 PM EST    DISCHARGE  [facility] day: 16  Location: [facility]07/007A Reason for admit: Hypoxia [R09.02]  Acute hypoxemic respiratory failure due to COVID-19 [facility]) [U07.1, J96.01]  COVID-19 [U07.1]   Procedure:   12/20 Intubated  01/01 Extubated  Barriers to Discharge: On nasal cannula oxygen at 4 liters with saturations at 94%. Afebrile. NPO until cleared. PT/OT. Asa, IV Rocephin, IV Decadron, Lovenox, diabetes management. PCP: Epifanio Pac  Readmission Risk Score: 14.7 ( )%  Patient Goals/Plan/Treatment Preferences: From home with spouse. Will follow for potential needs.

## 2022-01-03 NOTE — PROGRESS NOTES
CRITICAL CARE PROGRESS NOTE      Patient:  Jourdan Trevino    Unit/Bed:4B-07/007-A  YOB: 1950  MRN: [id number]   Acct: [de-identified]   PCP: Christiano Ceballos  Date of Admission: 12/18/2021  Chief Complaint:- Shortness of breath    Assessment and Plan:    Acute hypoxic respiratory failure secondary to COVID-19  Intubated on 12/20/2021, extubated 1/2/20/2022  Continuing on 4 L nasal cannula with SPO2 of 92%  Speech and Language Pathology to see today for swallow evaluation  COVID-19  Symptom onset 12/12/2021  Completed 14 days baricitinib  Tocilizumab on 1/2 given continued fevers, concern for cytokine storm  Continuing dexamethasone  ARDS  Status post pronation  Post bacterial pneumonia  Klebsiella oxytocin  Day 8 ceftriaxone  Hyperkalemia  Resolved  Diabetes mellitus type 2  Uncontrolled on admission, continue sliding scale, Lantus twice daily  Fever of unknown origin  Continue cooling blanket as needed, acetaminophen  Secondary to MRSA bacteremia versus cytokine storm versus Covid  Decreasing temperature 97.8 this morning  MRSA bacteremia  Cultures drawn on 12/31/2021 showed MRSA  Vancomycin day #3  Bradycardia  Resolved  Leukocytosis  WBC 15.7 today increased from 10.0 yesterday, will continue vancomycin  Obesity  Body mass index is 35.54 kg/m². CAD  Stent in 2008    INITIAL H AND P AND ICU COURSE:  \"Kaleigh Arzola is a 24-year-old white female who presented to [facility] on 12/18/2021 with complaints of shortness of breath she has a past medical history lifetime non-smoker, CAD with stent placement in 2008, hypertension, diabetes mellitus, dyslipidemia. Per report she presented to [facility] on 12/18 with complaints of hypoxia and shortness of breath. She was initially placed on 4 L nasal cannula. 12/19 patient had persistent hypoxia and was transitioned to high flow nasal cannula.   On 12/20 patient was transitioned to BiPAP and transferred to [facility] ICU for intubation. \"     1/1: Decreasing fever, now 98.4F. Alert, following commands, attempting to speak with ET tube in. MSSA bacteremia noted on blood culture, started vancomycin. 1/2: Extubated, alert. Continuing to be afebrile. 1/3: Alert and oriented, some repeated questions. Plan to transfer to floor today if bed available. Subjective:- (Last 24 hours)  Extubated, 1/2, continuing on 4 L oxygen via nasal cannula with sats in the low 90s. Does not have increased work of breathing, alert and oriented, slight confusion with repeated questioning. Patient states she is hot, would like to go outside or have cooling blanket applied. Would like water or coke float. Past medical history, family history, social history and allergies reviewed again and is unchanged since admission. ROS (12 point review of systems completed. Pertinent positives noted.  Otherwise ROS is negative)      Scheduled Meds:   dexamethasone  10 mg IntraVENous Daily    rosuvastatin  10 mg Oral Daily    insulin glargine  40 Units SubCUTAneous BID    furosemide  40 mg IntraVENous Daily    cefTRIAXone (ROCEPHIN) IV  1,000 mg IntraVENous Q24H    insulin lispro  0-18 Units SubCUTAneous Q4H    chlorhexidine  15 mL Mouth/Throat BID    famotidine (PEPCID) injection  20 mg IntraVENous BID    aspirin  81 mg Per NG tube Daily    enoxaparin  30 mg SubCUTAneous Q12H     Continuous Infusions:   sodium chloride 25 mL (01/03/22 0227)    dextrose       PRN Meds:.morphine, acetaminophen **OR** acetaminophen, bisacodyl, dextrose, sodium chloride flush, sodium chloride, ondansetron **OR** ondansetron, senna, polyethylene glycol, glucose, dextrose, glucagon (rDNA), dextrose, albuterol sulfate HFA     PHYSICAL EXAMINATION:  Patient Vitals for the past 8 hrs:   BP Temp Temp src Pulse Resp SpO2   01/03/22 1300 (!) 113/91 -- -- 97 17 94 %   01/03/22 1200 108/73 -- -- 93 22 94 %   01/03/22 1100 115/75 -- -- 90 23 95 %   01/03/22 1000 123/69 -- -- 89 23 94 % [x] H2RA [] Sucralfate [] Other:   VTE:     [x] Enoxaparin    [] Warfarin [] NOAC [] PCD Device:Bilat LE   [] Heparin: [] Subcut / [] IV     DATA :- LABS, RADIOLOGY- All radiographs, tracings, PFTs, and imaging are personally viewed and interpreted unless otherwise noted). Sodium 134, potassium 3.5, chloride 96, CO2 27, BUN 39, creatinine 0.5, anion gap 11.0, glucose 99  WBC 15.7 (increasing today, 2/2 MRSA bacteremia, hemoglobin 13.2, hematocrit 38.1, platelet count 991  Telemetry shows normal sinus rhythm    CONSULTS:-  [] Cardiology  [] Nephrology    [] Hemo onco   [] GI   [] ID   [] Endocrine  [] Pulmo      [] Neuro    [] Psych   [] Urology  [] ENT   [] Sancho Quoc   []Ortho    []CV surg        [] Palliative  [] Hospice [] Pain management   []      []TCU   [] PT/OT  OTHERS:- Pharmacy - Vancomycin dosing    CODE STATUS:-  [x] Full resuscitation [] DNR-Comfort Care-Arrest  [] DNR-Comfort Care   [] Limited Resuscitation   [] No ET intubation   [] No CPR   [] No shock for non-perfusing rhythm  [] No resuscitative medications        Meets Continued ICU Level Care Criteria:    [] Yes   [x] No - Transfer Planned to listed location: Med/Surg, no available beds  [x] HOSPITALIST CONTACTED-  (Name) No available beds, unsure of Medicine team taking over. Case and plan discussed with Dr. Asa Baron MD  PGY-1 Emergency Medicine    Patient seen by me. Case discussed with resident physician. Transition to medical floor. Italicized font represents changes to the note made by me.     Electronically signed by Summer Garcia MD on 1/3/2022 at 7:08 PM

## 2022-01-03 NOTE — PROGRESS NOTES
[address] [facility] CVICU 4B  Clinical Swallow Evaluation      SLP Individual Minutes  Time In: 2689  Time Out: 1250  Minutes: 20  Timed Code Treatment Minutes: 0 Minutes       Date: 1/3/2022  Patient Name: Juana Mallory      CSN: [id number]   : 1950  (73 y.o.)  Gender: female   Referring Physician:  WYATT Casas CNP  Diagnosis: Hypoxia  Secondary Diagnosis: Dysphagia, COVID-19    History of Present Illness/Injury: Patient admitted with above diagnosis. Per chart review, \"Kaleigh Sierra is a 72-year-old white female who presented to [facility] on 2021 with complaints of shortness of breath she has a past medical history lifetime non-smoker, CAD with stent placement in , hypertension, diabetes mellitus, dyslipidemia.  Per report she presented to [facility] on  with complaints of hypoxia and shortness of breath.  She was initially placed on 4 L nasal cannula.   patient had persistent hypoxia and was transitioned to high flow nasal cannula.  On  patient was transitioned to BiPAP and transferred to [facility] ICU for intubation. She was intubated on 2021 and extubated on 2022. \" ST consulted to complete clinical swallow evaluation to further assess swallow function and recommend safest level of po intake and/or need for instrumental evaluation s/p extubation following a 13 day intubation. No past medical history on file. SUBJECTIVE:  RN [facility] approved completion of clinical swallow evaluation this date. Upon arrival to room, patient awake in bed and agreeable to clinical swallow evaluation. Patient with adequate alertness, pleasant and cooperative throughout. No family present. OBJECTIVE:    Pain:  No pain reported.     Current Diet: NPO pending completion of clinical swallow evaluation     Respiratory Status:  Nasal Canula    Behavioral Observation:  Alert    Oral Mechanism Evaluation:      Facial / Labial WFL    Lingual WFL    Dentition WFL    Velum WFL    Vocal Quality Impaired Aphonic, decreased loudness   Sensation Not Tested    Cough Not Tested      Patient Evaluated Using:  Ice chips, thin liquids via cup/straw, puree, and hard/coarse texture     Oral Phase:  Impaired:  Loss of Bolus from Lips and Impaired Mastication    Pharyngeal Phase: Impaired:  Delayed Swallow and Decreased Hyolaryngeal Elevation    Signs and Symptoms of Laryngeal Penetration/Aspiration: Throat Clear    Impresssions: Patient presents with mild oral dysphagia with inability to fully discern potential presence of pharyngeal phase deficits without formal instrumentation. Patient demonstrated evidence of decreased labial seal resulting in anterior escape of thin liquids via cup, reduced intraoral pressure resulting in mild difficulty sucking from straw, suspected adequate bolus control/manipulation, adequate bolus formation, suspected delayed swallow initiation, suspected decreased hyolaryngeal elevation upon manual palpation, and delayed throat clear observed x2 following sips of thin liquids. Of course pharyngeal dysfunction and totality of airway invasion events cannot be formally assessed without presence of instrumental evaluation therefore at this time it is recommended patient complete MBS to further assess pharyngeal swallow function d/t heightened risk of dysphagia and silent aspiration s/t prolonged intubation and aphonic voicing. At this time, ST recommends patient remain NPO with exceptions for ice chips and sips of water following completion of oral care as well as medications whole in puree.      *post evaluation, patient without respiratory distress upon leaving room; RN Deacon Goodman notified re: clinical findings and recommendations from the assessment; verbal receptiveness noted         RECOMMENDATIONS/ASSESSMENT:  Instrumental Evaluation: Modified Barium Swallow (MBS)  Diet Recommendations:  NPO with exceptions for ice chips and water + medications whole in puree. Strategies:  Full Upright Position, Small Bite/Sip, Pulmonary Monitoring, Medications Whole with Puree and Oral Care    Rehabilitation Potential: good    EDUCATION:  Learner: Patient  Education:  Reviewed results and recommendations of this evaluation, Reviewed diet and strategies, Reviewed signs, symptoms and risks of aspiration, Reviewed ST goals and Plan of Care, Reviewed recommendations for follow-up, Education Related to Potential Risks and Complications Due to Impairment/Illness/Injury, Education Related to Prevention of Recurrence of Impairment/Illness/Injury and Education Related to [employer] and Wellness  Evaluation of Education: Verbalizes understanding and Needs further instruction    PLAN:  Skilled SLP intervention on acute care 3-5 x per week or until goals met and/or pt plateaus in function. Specific interventions for next session may include: MBS. PATIENT GOAL:    Did not state. Will further assess during treatment. SHORT TERM GOALS:  Short-term Goals  Timeframe for Short-term Goals: 2 weeks  Goal 1: Patient will consume PO trials with ST to determine readiness for diet initiation and/or completion of instrumental evaluation  Goal 2: Patient will complete MBS to further assess pharyngeal swallow function and recommend safest level of po intake as well as swallow strategies  Goal 3: Patient, staff, and family will utilize teachback method to complete comprehensive oral care to reduce colonization of oral bacteria and risk of developing aspiration pneumonia. Goal 4: Patient will complete [id number] evaluation to further assess current cognitive-linguistic skills and update POC as clinically indicated.     LONG TERM GOALS:  No long term goals recommended d/t short SAMARA Motta (Marilu Escalante 587) [address] M[address]

## 2022-01-03 NOTE — PROGRESS NOTES
Comprehensive Nutrition Assessment    Type and Reason for Visit:  Reassess (follow-up)    Nutrition Recommendations/Plan:   *Diet as per SLP recommendations  *If unable to start oral diet restart EN: Nepro Carb Steady with a goal rate of 35ml/hr with 1 proteinex 2GO (2.5oz liquid protein modular) daily and free water per MD  *Bowel meds per MD - last documented BM was 12/31/21, patient received prn glycolax on 1/2/22    Nutrition Assessment:    Pt. declining from nutritional standpoint AEB extubated, NPO, await SLP evaluation. At risk for further nutrition compromise r/t +COVID 12/19/21 (12/12 onset of symptoms: loss of appetite, taste/smell changes, nausea, diarrhea), acute respiratory failure, intubation 12/20/21, extubation 1/2/22, severe ARDS, previous pronation therapy, uncontrolled DM 2 with A1C (12/21) 9.9%, LOS day 16, overweight, and underlying medical condition (hx CAD, DM, obesity).  Nutrition recommendations/interventions as per above    Malnutrition Assessment:  Malnutrition Status:  Insufficient data    Context:  Acute Illness     Findings of the 6 clinical characteristics of malnutrition:  Energy Intake:   (loss of appetite x 1 week PTA reported on admission)  Weight Loss:  Unable to assess (no weight records per EMR)     Body Fat Loss:  Unable to assess     Muscle Mass Loss:  Unable to assess    Fluid Accumulation:  Unable to assess     Strength:  Not Performed    Estimated Daily Nutrient Needs:  Energy (kcal):  3764-2669 kcals (30-32); Weight Used for Energy Requirements:  Ideal (50kgm)     Protein (g):  100 grams or greater (2); Weight Used for Protein Requirements:  Ideal (50kgm)        Fluid (ml/day):  as per Doctor; Method Used for Fluid Requirements:  Other (Comment)      Nutrition Related Findings:    Patient extubated 1/2/22; spoke to RN who reports waiting on SLP evaluation for diet start; last documented BM as 12/31/21.   Labs noted: sodium: 134, MAP: 83  Meds reviewed: CAITLYN dexamethasone, lasix, lantus, humalog, prn glycolax given 1/2/22. Wounds:   (coccyx stage 1)       Current Nutrition Therapies:    Diet NPO    Anthropometric Measures:  · Height: 5' 2\" (157.5 cm)  · Current Body Weight: 194 lb (88 kg) (1/1/22, bedscale, +1 edema)   · Admission Body Weight: 206 lb 4.8 oz (93.6 kg) (12/18; no edema)    · Usual Body Weight:  (no weight records per EMR)     · Ideal Body Weight: 110 lbs;   · BMI: 35.5  · Adjusted Body Weight:  ; No Adjustment    · BMI Categories: Obese Class 2 (BMI 35.0 -39.9)       Nutrition Diagnosis:   · Inadequate oral intake related to impaired respiratory function as evidenced by NPO or clear liquid status due to medical condition    Nutrition Interventions:   Food and/or Nutrient Delivery:   (Diet as per SLP recommendations; if unable to start oral diet recommend restart EN)  Nutrition Education/Counseling:  No recommendation at this time   Coordination of Nutrition Care:  Continue to monitor while inpatient,Swallow Evaluation    Goals:  Patient will receive adequate nutrition in 1-4 days       Nutrition Monitoring and Evaluation:   Behavioral-Environmental Outcomes:  None Identified   Food/Nutrient Intake Outcomes:   (diet start vs need to restart EN)  Physical Signs/Symptoms Outcomes:  Biochemical Data,Chewing or Swallowing,GI Status,Fluid Status or Edema,Hemodynamic Status,Nutrition Focused Physical Findings,Skin,Weight     Discharge Planning:     Too soon to determine     Electronically signed by Nadia Valencia RD, LD on 1/3/22 at 11:49 AM EST    Contact: [phone]

## 2022-01-04 ENCOUNTER — APPOINTMENT (OUTPATIENT)
Dept: GENERAL RADIOLOGY | Age: 72
DRG: 207 | End: 2022-01-04
Payer: MEDICARE

## 2022-01-04 LAB
GLUCOSE BLD-MCNC: 120 MG/DL (ref 70–108)
GLUCOSE BLD-MCNC: 277 MG/DL (ref 70–108)
GLUCOSE BLD-MCNC: 80 MG/DL (ref 70–108)
GLUCOSE BLD-MCNC: 86 MG/DL (ref 70–108)

## 2022-01-04 PROCEDURE — 6370000000 HC RX 637 (ALT 250 FOR IP): Performed by: NURSE PRACTITIONER

## 2022-01-04 PROCEDURE — 92526 ORAL FUNCTION THERAPY: CPT

## 2022-01-04 PROCEDURE — 92611 MOTION FLUOROSCOPY/SWALLOW: CPT

## 2022-01-04 PROCEDURE — 82948 REAGENT STRIP/BLOOD GLUCOSE: CPT

## 2022-01-04 PROCEDURE — 1200000003 HC TELEMETRY R&B

## 2022-01-04 PROCEDURE — 1200000000 HC SEMI PRIVATE

## 2022-01-04 PROCEDURE — 2500000003 HC RX 250 WO HCPCS: Performed by: STUDENT IN AN ORGANIZED HEALTH CARE EDUCATION/TRAINING PROGRAM

## 2022-01-04 PROCEDURE — 99232 SBSQ HOSP IP/OBS MODERATE 35: CPT | Performed by: NURSE PRACTITIONER

## 2022-01-04 PROCEDURE — 2580000003 HC RX 258: Performed by: NURSE PRACTITIONER

## 2022-01-04 PROCEDURE — 74230 X-RAY XM SWLNG FUNCJ C+: CPT

## 2022-01-04 PROCEDURE — 6360000002 HC RX W HCPCS: Performed by: STUDENT IN AN ORGANIZED HEALTH CARE EDUCATION/TRAINING PROGRAM

## 2022-01-04 PROCEDURE — 6360000002 HC RX W HCPCS: Performed by: PHYSICIAN ASSISTANT

## 2022-01-04 RX ORDER — FAMOTIDINE 20 MG/1
20 TABLET, FILM COATED ORAL 2 TIMES DAILY
Status: DISCONTINUED | OUTPATIENT
Start: 2022-01-04 | End: 2022-01-14 | Stop reason: HOSPADM

## 2022-01-04 RX ORDER — INSULIN GLARGINE 100 [IU]/ML
20 INJECTION, SOLUTION SUBCUTANEOUS NIGHTLY
Status: DISCONTINUED | OUTPATIENT
Start: 2022-01-05 | End: 2022-01-05

## 2022-01-04 RX ORDER — FUROSEMIDE 40 MG/1
40 TABLET ORAL DAILY
Status: DISCONTINUED | OUTPATIENT
Start: 2022-01-04 | End: 2022-01-14 | Stop reason: HOSPADM

## 2022-01-04 RX ADMIN — BARIUM SULFATE 20 ML: 400 PASTE ORAL at 11:12

## 2022-01-04 RX ADMIN — BARIUM SULFATE 40 ML: 0.81 POWDER, FOR SUSPENSION ORAL at 11:13

## 2022-01-04 RX ADMIN — DEXAMETHASONE SODIUM PHOSPHATE 10 MG: 4 INJECTION, SOLUTION INTRAMUSCULAR; INTRAVENOUS at 09:42

## 2022-01-04 RX ADMIN — ROSUVASTATIN 10 MG: 10 TABLET, FILM COATED ORAL at 20:19

## 2022-01-04 RX ADMIN — FAMOTIDINE 20 MG: 20 TABLET ORAL at 09:42

## 2022-01-04 RX ADMIN — INSULIN LISPRO 3 UNITS: 100 INJECTION, SOLUTION INTRAVENOUS; SUBCUTANEOUS at 18:47

## 2022-01-04 RX ADMIN — Medication 8.8 MG: at 16:58

## 2022-01-04 RX ADMIN — ASPIRIN 81 MG CHEWABLE TABLET 81 MG: 81 TABLET CHEWABLE at 09:42

## 2022-01-04 RX ADMIN — SODIUM CHLORIDE, PRESERVATIVE FREE 10 ML: 5 INJECTION INTRAVENOUS at 09:42

## 2022-01-04 RX ADMIN — FAMOTIDINE 20 MG: 20 TABLET ORAL at 20:19

## 2022-01-04 RX ADMIN — FUROSEMIDE 40 MG: 40 TABLET ORAL at 09:42

## 2022-01-04 RX ADMIN — SODIUM CHLORIDE, PRESERVATIVE FREE 10 ML: 5 INJECTION INTRAVENOUS at 20:17

## 2022-01-04 RX ADMIN — ENOXAPARIN SODIUM 30 MG: 100 INJECTION SUBCUTANEOUS at 02:14

## 2022-01-04 RX ADMIN — ENOXAPARIN SODIUM 30 MG: 100 INJECTION SUBCUTANEOUS at 12:25

## 2022-01-04 RX ADMIN — CHLORHEXIDINE GLUCONATE 0.12% ORAL RINSE 15 ML: 1.2 LIQUID ORAL at 09:42

## 2022-01-04 NOTE — PROGRESS NOTES
[address] MED SURG 8B  Modified Barium Swallow + Dysphagia tx    SLP Individual Minutes  Time In: 5646  Time Out: 0239  Minutes: 20  Timed Code Treatment Minutes: 0 Minutes     MBS: 10 minutes   Dysphagia tx: 10 minutes     Date: 2022  Patient Name: Stevenson Smith      CSN: [id number]   : 1950  (73 y.o.)  Gender: female   Referring Physician:  Violet Ludwig  Diagnosis: Hypoxia  Secondary Diagnosis: Dysphagia, COVID-19  Precautions: Aspiration Risk   History of Present Illness/Injury: Patient admitted with above diagnosis. Per chart review, \"Kaleigh Sierra is a 77-year-old white female who presented to [facility] on 2021 with complaints of shortness of breath she has a past medical history lifetime non-smoker, CAD with stent placement in , hypertension, diabetes mellitus, dyslipidemia.  Per report she presented to [facility] on  with complaints of hypoxia and shortness of breath.  She was initially placed on 4 L nasal cannula.   patient had persistent hypoxia and was transitioned to high flow nasal cannula.  On  patient was transitioned to BiPAP and transferred to [facility] ICU for intubation. She was intubated on 2021 and extubated on 2022. \" ST recommended completion of MBS to further assess pharyngeal swallow function d/t heightened risk of dysphagia and silent aspiration s/t prolonged intubation and aphonic voicing.     has no past medical history on file. Current Diet: NPO with exceptions for ice chips and sips of water pending MBS     Pain: No pain reported. SUBJECTIVE:  Patient arrived to fluoro suite via bed, awake and alert with intermittent confusion presents. Patient with adequate alertness and engagement throughout study.      OBJECTIVE:    Respiratory Status:  Nasal Canula (3L)    Behavioral Observation:  Alert, Oriented and Intermittent Confusion    PATIENT WAS EVALUATED USING: BARIUM: thin liquids via tsp/cup/straw, puree, soft texture, mixed consistency, and hard/coarse texture     ORAL PREPARATION PHASE:  Impaired:  Slow Mastication, Uncoordinated Mastication, Decreased Bolus Control and Decreased Bolus Formation    ORAL PHASE: Uncoordinated AP Movement, Slow AP Movement and Oral Residue      ORAL PHASE ALISON SCORE: (Dysphagia outcome and severity scale)  5 = Mild Dysphagia - May have one diet consistency restricted - Mild oral residue but clears    PHARYNGEAL PHASE:  Impaired: Delayed Swallow, Decreased Tongue Based Retraction, Residue in the Valleculae, Residue in the Pyriform Sinus, Residue Along the Posterior Pharyngeal Wall and Decreased Pharyngeal Constriction      PHARYNGEAL PHASE ALISON SCORE: (Dysphagia outcome and severity scale)  4 = Mild-Moderate Dysphagia - One or two consistencies restricted - may exhibit one or more of the following: Residue clears with cue, Aspiration of one consistency with weak or no reflexive cough, Laryngeal penetration to the vocal cords with cough with two consistencies, Laryngeal penetration to the vocal cords without cough on one consistency    EVIDENCE FOR LARYNGEAL PENETRATION AND/OR ASPIRATION:  No evidence of aspiration  Laryngeal penetration evident with thin liquids     PENETRATION-ASPIRATION SCALE (PAS):   Thin Liquids: 2 = Material enters the airway, remains above vocal folds, and is ejected from the airway  Puree:  1 = Material does not enter the airway  Soft Solid:  1 = Material does not enter the airway  Hard Solid: 1 = Material does not enter the airway    ESOPHAGEAL PHASE:   No significant findings    ATTEMPTED TECHNIQUES:  Small Bolus Size Effective    Straw Effective    Cup Effective    Chin Tuck Not Attempted    Head Turn Not Attempted    Spoon Presentations Effective    Volitional Cough Not Attempted    Spontaneous Cough Not Attempted           DIAGNOSTIC IMPRESSIONS:  Patient presents with mild oral dysphagia and mild-moderate pharyngeal dysphagia based on skilled clinical findings outlined above. Patient demonstrated evidence of slow/uncoordinated mastication with effective textural breakdown when given additional time, decreased bolus formation, and slow/uncoordinated ap transit resulting in mild oral residue that cleared with additional swallow. Patient with decreased bolus control resulting in premature spillage into the pharynx to the level of the vallecula and pyriforms which was further compounded by delayed swallow initiation. Patient with adequate hyolaryngeal elevation/excursion resulting in complete epiglottic inversion and adequate thyrohyoid approximation; however, patient with intermittent trace, transient penetration occurring DURING the swallow with thin liquids. No aspiration observed this date. Patient with decreased base of tongue retraction and pharyngeal constriction resulting in mild pharyngeal residue remaining in the vallecula, along the posterior pharyngeal wall, and within the pyriforms. Pharyngeal residue cleared with implementation of liquid wash OR double swallow. Of note, patient with bony protrusion at C5; however, protrusion did not appear to impact bolus flow. At this time, recommend patient initiate a soft and bite-size diet with thin liquids with direct 1:1 assistance d/t reduced UE ROM/Strength and implementation of upright position, small bites/sips, alternation of bites/sips, and double swallow. Dysphagia tx: Following completion of MBS, ST completed review of swallow anatomy and physiology via use of computer monitor. ST defined aspiration and penetration and explained risk factors associated with aspiration (pneumonia, pulmonary decline, respiratory failure, extended/frequent hospitalizations, etc). ST then reviewed results from [facility]. ST discussed diet recommendations as well as recommended swallow strategies to improve safety and efficiency.  Patient stated understanding and was in agreement with POC moving forward. *post evaluation, patient without respiratory distress upon leaving room; RN Shabbir Graham notified re: clinical findings and recommendations from the assessment; verbal receptiveness noted      Diet Recommendations:  Soft and Bite-Size with Thin Liquids   Strategies:  Full Upright Position, Small Bite/Sip, Multiple Swallow, Pulmonary Monitoring, Medications Whole with Puree, Direct 1:1 Supervision, Alternate Solids and Liquids, Limit Distractions and Monitor for Fatigue   Rehabilitation Potential: good    EDUCATION:  Learner: Patient  Education:  Reviewed results and recommendations of this evaluation, Reviewed diet and strategies, Reviewed signs, symptoms and risks of aspiration, Reviewed ST goals and Plan of Care, Education Related to Potential Risks and Complications Due to Impairment/Illness/Injury, Education Related to Prevention of Recurrence of Impairment/Illness/Injury and Education Related to [employer] [facility]ness  Evaluation of Education: Verbalizes understanding, Demonstrates with assistance and Needs further instruction    PLAN:  Skilled SLP intervention on acute care 3-5 x per week or until goals met and/or pt plateaus in function. Specific interventions for next session may include: PO trials and pharyngeal strengthening exercises. PATIENT GOAL:    Did not state. Will further assess during treatment. SHORT TERM GOALS:  Short-term Goals  Timeframe for Short-term Goals: 2 weeks  Goal 1: Patient wll consume a soft and bite-size diet with thin liquids while implementing recommended swallow strategies with no overt s/s of aspiration and adequate endurance to assist with meeting nutrition/hydration needs.   Goal 2: Patient will consume advanced PO trials with ST while demonstrating timely/coordinated mastication, complete bolus clearance, no overt s/s of aspiration, and adeqaute endurance for potential diet advancement to least restrictive diet  Goal 3: Patient will complete pharyngeal strengthening exercises x10-15 (effortful swallow, chris) to improve overall timeliness of swallow, airway protection, and decrease pharyngeal residue. Goal 4: Patient will complete vypyhr-[id number] evaluation to further assess current cognitive-linguistic skills and update POC as clinically indicated.       LONG TERM GOALS:  No long term goals recommended d/t short SAMARA Motta (Marilu Escalante 587) [address]

## 2022-01-04 NOTE — CARE COORDINATION
Discharge Planning Update:    Hospitalist following. Transfer from [facility] ICU post extubation. On 2L NC. Inhaler. IV dexamethasone. PT/OT/SLP. Possible new placement, SW consult. Out of isolation on 1/6.

## 2022-01-04 NOTE — PROGRESS NOTES
be admitted to [address] [place] [address] for treatment and family close by. She states she has felt okay in spite of measured hypoxia.    She denies fevers, chills, chest pain, dizziness, numbness or tingling, abdominal pain      ED course:   Vitals on arrival 98.5F, RR 28, HR 95, /77  Labs: Lactic acid, CBC, ABG, Ferritin, CRP   EKG, CXR, CTA chest\"     12/19--> now on high flow however settings not documented as patient desatted to 83% on 6 L; stop IV fluids     12/20-->on BiPAP now despite being maxed out on high flow and was tachypneic; palliative care saw patient and she does want to be a full code and is okay with intubation    1/4->-patient was intubated on 12/20 and was extubated on 1/2; she was transferred out to stepdown on 1/3; she is hemodynamically stable, she is currently on 3 L of oxygen; speech therapy is planning a modified barium swallow today; patient is fully alert and oriented, in no distress, she is extremely weak and has a very weak voice    Subjective (past 24 hours): Denies pain, states she feels better however states she feels quite weak      Medications:  Reviewed    Infusion Medications    sodium chloride 25 mL (01/03/22 0227)    dextrose       Scheduled Medications    dexamethasone  10 mg IntraVENous Daily    rosuvastatin  10 mg Oral Daily    insulin glargine  40 Units SubCUTAneous BID    furosemide  40 mg IntraVENous Daily    cefTRIAXone (ROCEPHIN) IV  1,000 mg IntraVENous Q24H    insulin lispro  0-18 Units SubCUTAneous Q4H    chlorhexidine  15 mL Mouth/Throat BID    famotidine (PEPCID) injection  20 mg IntraVENous BID    aspirin  81 mg Per NG tube Daily    enoxaparin  30 mg SubCUTAneous Q12H     PRN Meds: morphine, acetaminophen **OR** acetaminophen, bisacodyl, dextrose, sodium chloride flush, sodium chloride, ondansetron **OR** ondansetron, senna, polyethylene glycol, glucose, dextrose, glucagon (rDNA), dextrose, albuterol sulfate HFA      Intake/Output Summary (Last 24 hours) at 1/4/2022 0714  Last data filed at 1/4/2022 0416  Gross per 24 hour   Intake 65 ml   Output 1300 ml   Net -1235 ml       Diet:  Diet NPO Exceptions are: Ice Chips    Exam:  /63   Pulse 80   Temp 98.2 °F (36.8 °C) (Oral)   Resp 12   Ht 5' 2\" (1.575 m)   Wt 186 lb 1.6 oz (84.4 kg)   SpO2 (!) 87%   BMI 34.04 kg/m²     General appearance: No apparent distress, appears stated age and cooperative. Extremely weak  HEENT: Pupils equal, round, and reactive to light. Conjunctivae/corneas clear. Weak, hoarse voice  Neck: Supple, with full range of motion. No jugular venous distention. Trachea midline. Respiratory:  Normal respiratory effort. Clear to auscultation, bilaterally without Rales/Wheezes/Rhonchi. Cardiovascular: Regular rate and rhythm with normal S1/S2 without murmurs, rubs or gallops. Abdomen: Soft, non-tender, non-distended with normal bowel sounds. Musculoskeletal: passive and active ROM x 4 extremities. Skin: Skin color, texture, turgor normal.    Neurologic:  Neurovascularly intact without any focal sensory/motor deficits. Cranial nerves: II-XII intact, grossly non-focal.  Psychiatric: Alert and oriented x 4, thought content appropriate  Capillary Refill: Brisk,< 3 seconds   Peripheral Pulses: +2 palpable, equal bilaterally       Labs:   Recent Labs     01/02/22  0400 01/03/22  0403   WBC 10.0 15.7*   HGB 12.4 13.2   HCT 36.5* 38.1    186     Recent Labs     01/02/22  0400 01/03/22  0403    134*   K 3.5 3.5   CL 96* 96*   CO2 25 27   BUN 40* 39*   CREATININE 0.5 0.5   CALCIUM 8.2* 8.4*     Radiology:  CTA CHEST W WO CONTRAST    Result Date: 12/18/2021  PROCEDURE: CTA CHEST W WO CONTRAST CLINICAL INFORMATION: shortness of breath. COMPARISON: No prior study. TECHNIQUE: 3 mm axial images were obtained through the chest after the administration of IV contrast.  A non-contrast localizer was obtained.   3D reconstructions were performed on the scanner to include MIP coronal and sagittal images through the chest. Isovue was the intravenous contrast utilized. All CT scans at this facility use dose modulation, iterative reconstruction, and/or weight-based dosing when appropriate to reduce radiation dose to as low as reasonably achievable. FINDINGS: There is adequate opacification of the pulmonary arterial system. No pulmonary emboli are present. There is atherosclerotic calcification in the aortic arch. . There is mild dilatation of the ascending aorta which measures 3.1 cm in diameter. There is mild cardiomegaly. There is no pericardial or pleural effusion. There is no mediastinal, axillary or hilar adenopathy. There are areas of abnormal density throughout both lung fields consistent with inflammatory process, presumably Covid 19 infection. . There is thoracic spondylosis. .  There is hepatosplenomegaly. .     1. No evidence of pulmonary embolism. 2. Areas of abnormal density throughout both lung fields consistent with inflammatory process, presumably Covid 19 infection. 3. Mild cardiomegaly. 4. Mild dilatation of the ascending aorta. 5. Thoracic spondylosis. 6. Hiatal hernia. 7. Hepatosplenomegaly. **This report has been created using voice recognition software. It may contain minor errors which are inherent in voice recognition technology. ** Final report electronically signed by DR Marie Rodriguez on 12/18/2021 3:02 PM    XR CHEST PORTABLE    Result Date: 12/18/2021  PROCEDURE: XR CHEST PORTABLE CLINICAL INFORMATION: SOB COMPARISON: None TECHNIQUE: AP portable chest radiograph performed. FINDINGS: Diffuse groundglass opacification of both lungs with areas of interstitial thickening and nodular consolidative opacities. Cardiac silhouette is mildly enlarged. No pleural effusion. No pneumothorax. No acute bony abnormality. Degenerative changes of the thoracic spine. 1. Diffuse groundglass opacification of both lungs with areas of interstitial thickening and nodular consolidative opacities.  Findings likely relate to multilobar pneumonia. Pulmonary edema could also give this appearance. **This report has been created using voice recognition software. It may contain minor errors which are inherent in voice recognition technology. ** Final report electronically signed by Dr Jewell Mcelroy on 12/18/2021 2:20 PM      DVT prophylaxis: [x] Lovenox                                 [] SCDs                                 [] SQ Heparin                                 [] Encourage ambulation           [] Already on Anticoagulation     Code Status: Full Code    PT/OT Eval Status:  On case    Tele:   [] yes             [x] no    Active Hospital Problems    Diagnosis Date Noted    Acute respiratory failure with hypoxia ([facility] [address] [J96.01]     Hypoxia [R09.02]     COVID-19 [U07.1] 12/18/2021       Electronically signed by WYATT Dumont CNP on 1/4/2022 at 7:14 AM

## 2022-01-04 NOTE — ACP (ADVANCE CARE PLANNING)
Advance Care Planning     Advance Care Planning Inpatient Note  [facility] Department    Today's Date: 1/4/2022  Unit: [facility] MED SURG 8B    Received request from rounding. Upon review of chart and communication with care team, [facility] will defer advance care planning with patient at this time. . Patient and Spouse was/were present in the room during visit. Goals of ACP Conversation:  Discuss advance care planning documents    Health Care Decision Makers:       Primary Decision Maker: Sathish Atwood - [phone]    Summary:  Documented Next of Kin, per patient report    Advance Care Planning Documents (Patient Wishes):  None     Assessment:  Pt was laying in bed with a nasal canula in place. Pt was very weak, pt appeared oriented most of the conversation, but then asked a very bizarre question of her  at the end of the encounter.  left AD forms for pt to look at later. Pt stated they would not want on a vent, but would want CPR. Pt's  was clear he didn't think his wife was capable of making decisions today, as she had just recently come off of being on a vent of her own choosing. Interventions:  Deferred conversation as patient not interested in completing an advance directive at this time    Care Preferences Communicated:   Ventilation:   If the patient, in their present state of health, suddenly became very ill and unable to breathe on their own,     the patient would NOT desire the use of a ventilator (breathing machine). If their health worsens and it becomes clear that the change of recovery is unlikely,     the patient would NOT desire the use of a ventilator (breathing machine). Resuscitation:  In the event the patient's heart stopped as a result of an underlying serious health condition, the patient communicates a preference for      resuscitative attempts (CPR).     Outcomes/Plan:  Left AD forms for pt to consider when condition improves.     Electronically signed by Chaplain Les on 1/4/2022 at 4:59 PM

## 2022-01-04 NOTE — CARE COORDINATION
[facility] DISCHARGE/PLANNING EVALUATION  1/4/22, 5:26 PM EST    Reason for Referral: very weak, deconditioned  Mental Status: Patient is confused  Decision Making:  Patient  is making decisions until confusion is clear  Family/Social/Home Environment: Spoke with  and daughter, assessment completed. Patient lives with . She was very independent until covid. She is currently very weak. Current Services including food security, transportation and housekeeping: no current services  Current Equipment: none  Payment Source: Medicare and Medical [place]  Concerns or Barriers to Discharge: Discussed ECF vs HH. Patient out of isolation on 1/6, if ECF needed will have more options. Post acute provider list with quality measures, geographic area and applicable managed care information provided. Questions regarding selection process answered:Provided ECF list, will follow up after PT see patient tomorrow to discuss leve of care needed. Teach Back Method used with pateint regarding care plan and discharge planning. Patient and spouse verbalize understanding of the plan of care and contribute to goal setting. Patient goals, treatment preferences and discharge plan: Plan ECF vs HH. SW will continue to follow.     Electronically signed by KIARRA Dejesus on 1/4/2022 at 5:26 PM

## 2022-01-05 LAB
ACINETOBACTER BAUMANNII FILM ARRAY: NOT DETECTED
BLOOD CULTURE, ROUTINE: NORMAL
BOTTLE TYPE: ABNORMAL
CANDIDA ALBICANS FILM ARRAY: NOT DETECTED
CANDIDA GLABRATA FILM ARRAY: NOT DETECTED
CANDIDA KRUSEI FILM ARRAY: NOT DETECTED
CANDIDA PARAPSILOSIS FILM ARRAY: NOT DETECTED
CANDIDA TROPICALIS FILM ARRAY: NOT DETECTED
CARBAPENEM RESITANT FILM ARRAY: ABNORMAL
ENTERBACTER CLOACAE FILM ARRAY: NOT DETECTED
ENTERBACTERIACEAE FILM ARRAY: NOT DETECTED
ENTEROCOCCUS FILM ARRAY: NOT DETECTED
ERYTHROCYTE [DISTWIDTH] IN BLOOD BY AUTOMATED COUNT: 12.1 % (ref 11.5–14.5)
ERYTHROCYTE [DISTWIDTH] IN BLOOD BY AUTOMATED COUNT: 36.2 FL (ref 35–45)
ESCHERICHIA COLI FILM ARRAY: NOT DETECTED
GLUCOSE BLD-MCNC: 208 MG/DL (ref 70–108)
GLUCOSE BLD-MCNC: 239 MG/DL (ref 70–108)
GLUCOSE BLD-MCNC: 289 MG/DL (ref 70–108)
GLUCOSE BLD-MCNC: 290 MG/DL (ref 70–108)
GLUCOSE BLD-MCNC: 304 MG/DL (ref 70–108)
GLUCOSE BLD-MCNC: 323 MG/DL (ref 70–108)
HAEMOPHILUS INFLUENZA FILM ARRAY: NOT DETECTED
HCT VFR BLD CALC: 39.2 % (ref 37–47)
HEMOGLOBIN: 13.8 GM/DL (ref 12–16)
KLEBSIELLA OXYTOCA FILM ARRAY: NOT DETECTED
KLEBSIELLA PNEUMONIAE FILM ARRAY: NOT DETECTED
LISTERIA MONOCYTOGENES FILM ARRAY: NOT DETECTED
MCH RBC QN AUTO: 29.6 PG (ref 26–33)
MCHC RBC AUTO-ENTMCNC: 35.2 GM/DL (ref 32.2–35.5)
MCV RBC AUTO: 83.9 FL (ref 81–99)
METHICILLIN RESISTANT FILM ARRAY: DETECTED
NEISSERIA MENIGITIDIS FILM ARRAY: NOT DETECTED
PLATELET # BLD: 188 THOU/MM3 (ref 130–400)
PMV BLD AUTO: 11.4 FL (ref 9.4–12.4)
PROTEUS FILM ARRAY: NOT DETECTED
PSEUDOMONAS AERUGINOSA FILM ARRAY: NOT DETECTED
RBC # BLD: 4.67 MILL/MM3 (ref 4.2–5.4)
SERRATIA MARCESCENS FILM ARRAY: NOT DETECTED
SOURCE OF BLOOD CULTURE: ABNORMAL
STAPH AUREUS FILM ARRAY: NOT DETECTED
STAPHYLOCOCCUS FILM ARRAY: DETECTED
STREP AGALACTIAE FILM ARRAY: NOT DETECTED
STREP PNEUMONIAE FILM ARRAY: NOT DETECTED
STREP PYOCGENES FILM ARRAY: NOT DETECTED
STREPTOCOCCUS FILM ARRAY: NOT DETECTED
VANCOMYCIN RESISTANT FILM ARRAY: ABNORMAL
WBC # BLD: 12.4 THOU/MM3 (ref 4.8–10.8)

## 2022-01-05 PROCEDURE — 99232 SBSQ HOSP IP/OBS MODERATE 35: CPT | Performed by: NURSE PRACTITIONER

## 2022-01-05 PROCEDURE — 6370000000 HC RX 637 (ALT 250 FOR IP): Performed by: NURSE PRACTITIONER

## 2022-01-05 PROCEDURE — 1200000003 HC TELEMETRY R&B

## 2022-01-05 PROCEDURE — 97166 OT EVAL MOD COMPLEX 45 MIN: CPT

## 2022-01-05 PROCEDURE — 97530 THERAPEUTIC ACTIVITIES: CPT

## 2022-01-05 PROCEDURE — 51798 US URINE CAPACITY MEASURE: CPT

## 2022-01-05 PROCEDURE — 6360000002 HC RX W HCPCS: Performed by: NURSE PRACTITIONER

## 2022-01-05 PROCEDURE — 82948 REAGENT STRIP/BLOOD GLUCOSE: CPT

## 2022-01-05 PROCEDURE — 1200000000 HC SEMI PRIVATE

## 2022-01-05 PROCEDURE — 85027 COMPLETE CBC AUTOMATED: CPT

## 2022-01-05 PROCEDURE — 2580000003 HC RX 258: Performed by: NURSE PRACTITIONER

## 2022-01-05 PROCEDURE — 6360000002 HC RX W HCPCS: Performed by: PHYSICIAN ASSISTANT

## 2022-01-05 RX ORDER — INSULIN GLARGINE 100 [IU]/ML
20 INJECTION, SOLUTION SUBCUTANEOUS
Status: DISCONTINUED | OUTPATIENT
Start: 2022-01-05 | End: 2022-01-14 | Stop reason: HOSPADM

## 2022-01-05 RX ORDER — DEXAMETHASONE SODIUM PHOSPHATE 4 MG/ML
10 INJECTION, SOLUTION INTRA-ARTICULAR; INTRALESIONAL; INTRAMUSCULAR; INTRAVENOUS; SOFT TISSUE DAILY
Status: COMPLETED | OUTPATIENT
Start: 2022-01-05 | End: 2022-01-05

## 2022-01-05 RX ORDER — ALBUTEROL SULFATE 90 UG/1
2 AEROSOL, METERED RESPIRATORY (INHALATION) EVERY 6 HOURS PRN
Qty: 18 G | Refills: 3 | Status: ON HOLD | OUTPATIENT
Start: 2022-01-05 | End: 2022-01-14

## 2022-01-05 RX ADMIN — FAMOTIDINE 20 MG: 20 TABLET ORAL at 21:52

## 2022-01-05 RX ADMIN — FAMOTIDINE 20 MG: 20 TABLET ORAL at 10:15

## 2022-01-05 RX ADMIN — INSULIN GLARGINE 20 UNITS: 100 INJECTION, SOLUTION SUBCUTANEOUS at 09:53

## 2022-01-05 RX ADMIN — INSULIN LISPRO 4 UNITS: 100 INJECTION, SOLUTION INTRAVENOUS; SUBCUTANEOUS at 09:55

## 2022-01-05 RX ADMIN — SODIUM CHLORIDE, PRESERVATIVE FREE 10 ML: 5 INJECTION INTRAVENOUS at 21:52

## 2022-01-05 RX ADMIN — INSULIN LISPRO 6 UNITS: 100 INJECTION, SOLUTION INTRAVENOUS; SUBCUTANEOUS at 13:26

## 2022-01-05 RX ADMIN — ENOXAPARIN SODIUM 30 MG: 100 INJECTION SUBCUTANEOUS at 13:42

## 2022-01-05 RX ADMIN — INSULIN LISPRO 3 UNITS: 100 INJECTION, SOLUTION INTRAVENOUS; SUBCUTANEOUS at 01:10

## 2022-01-05 RX ADMIN — FUROSEMIDE 40 MG: 40 TABLET ORAL at 10:15

## 2022-01-05 RX ADMIN — DEXAMETHASONE SODIUM PHOSPHATE 10 MG: 4 INJECTION, SOLUTION INTRAMUSCULAR; INTRAVENOUS at 10:15

## 2022-01-05 RX ADMIN — ENOXAPARIN SODIUM 30 MG: 100 INJECTION SUBCUTANEOUS at 00:56

## 2022-01-05 RX ADMIN — INSULIN LISPRO 4 UNITS: 100 INJECTION, SOLUTION INTRAVENOUS; SUBCUTANEOUS at 22:12

## 2022-01-05 RX ADMIN — INSULIN LISPRO 8 UNITS: 100 INJECTION, SOLUTION INTRAVENOUS; SUBCUTANEOUS at 18:18

## 2022-01-05 RX ADMIN — ROSUVASTATIN 10 MG: 10 TABLET, FILM COATED ORAL at 21:52

## 2022-01-05 RX ADMIN — ASPIRIN 81 MG CHEWABLE TABLET 81 MG: 81 TABLET CHEWABLE at 10:15

## 2022-01-05 RX ADMIN — INSULIN LISPRO 2 UNITS: 100 INJECTION, SOLUTION INTRAVENOUS; SUBCUTANEOUS at 06:03

## 2022-01-05 ASSESSMENT — PAIN SCALES - GENERAL
PAINLEVEL_OUTOF10: 0

## 2022-01-05 NOTE — PROGRESS NOTES
Consult received, chart reviewed, await work up with Occupational Therapy and PM&R consult for further determination of patient needs.

## 2022-01-05 NOTE — CONSULTS
Per note from Roscoe Felix, anticipate removal of COVID isolation tomorrow. Will see, evaluate, and complete full consultation at that time. Thank you so much for allowing us to participate in the care of your patient.     Micah Gudino M.D.

## 2022-01-05 NOTE — PROGRESS NOTES
Hospitalist Progress Note    Patient:  Jacinto Jovel      Unit/Bed:8B-37/037-A    YOB: 1950    MRN: [id number]       Acct: [de-identified]     PCP: Marlo Garcia    Date of Admission: 12/18/2021    Assessment/Plan:    1. Pneumonia secondary to COVID-19 infection--had baricitinib x14 days, had tocilizumab on 1/2 secondary to continued fevers and concern for cytokine storm; had Decadron 20 mg and currently on 10 mg x 5 days started 1/1-1/5  2. Acute hypoxic respiratory failure secondary to COVID-19--intubated on 12/20/2021 and extubated on January 2, 2022; currently on 2 L of oxygen; order home oxygen evaluation  3. ARDS--patient was proned  4. Bacterial pneumonia with Klebsiella oxytocin--Rocephin 12/24-1/4  5. Diabetes mellitus type 2, uncontrolled--since patient is eating well we will resume Lantus 20 units daily, change sliding scale to medium dose before meals and at bedtime  6. Mild oral dysphagia/mild to moderate pharyngeal dysphagia--appreciate speech therapy input, diet is soft and bite-size with thin liquid  7. Physical deconditioning/debility--likely secondary to #1, #3 and #4; PT/OT, involve  for possibly needing rehab/ECF on discharge  8. Obesity with BMI 33.45    Expected discharge date: Pending clinical course    Disposition:    [] Home       [] TCU       [] Rehab       [] Psych       [] SNF       [] [place]       [] Other-    Chief Complaint: Shortness of breath    Hospital Course:  Per H&P dated 12/18: Jerri Angi Sierra is a 73 y.o. female with PMHx of CAD, essential hypertension, type II diabetes, who presents to [address] [place] [address] with shortness of breath. Patient states she began having symptoms on 12/12 of loss of appetite, changes in taste and smell, food aversions, nausea with dry heaves and diarrhea.   she originally attributed these symptoms to changes in her diabetic medications.    As symptoms persisted, patient states she was advised by her PCP to go to ED at [facility] for workup and found to be COVID positive with hypoxia 70% spo2. She decided to leave AMA per their recommendation she be admitted to their facility as she preferred to be admitted to [address] [place] [address] for treatment and family close by. She states she has felt okay in spite of measured hypoxia.    She denies fevers, chills, chest pain, dizziness, numbness or tingling, abdominal pain      ED course:   Vitals on arrival 98.5F, RR 28, HR 95, /77  Labs: Lactic acid, CBC, ABG, Ferritin, CRP   EKG, CXR, CTA chest\"     12/19--> now on high flow however settings not documented as patient desatted to 83% on 6 L; stop IV fluids     12/20-->on BiPAP now despite being maxed out on high flow and was tachypneic; palliative care saw patient and she does want to be a full code and is okay with intubation    1/4->-patient was intubated on 12/20 and was extubated on 1/2; she was transferred out to stepdown on 1/3; she is hemodynamically stable, she is currently on 3 L of oxygen; speech therapy is planning a modified barium swallow today; patient is fully alert and oriented, in no distress, she is extremely weak and has a very weak voice    1/5--> hemodynamically stable, on 2 L of oxygen satting 94%; patient is alert and oriented however needs frequent redirection with her phone, she is asking for her coat \"to get out of here\"    Subjective (past 24 hours): Denies pain, states she feels better however states she feels quite weak; states she ate fine yesterday    Medications:  Reviewed    Infusion Medications    sodium chloride 25 mL (01/03/22 1195)    dextrose       Scheduled Medications    insulin glargine  20 Units SubCUTAneous QAM AC    insulin lispro  0-12 Units SubCUTAneous TID WC    insulin lispro  0-6 Units SubCUTAneous Nightly    dexamethasone  10 mg IntraVENous Daily    famotidine  20 mg Oral BID    furosemide  40 mg Oral Daily    rosuvastatin  10 mg Oral Daily    aspirin  81 mg Per NG tube Daily    enoxaparin  30 mg SubCUTAneous Q12H     PRN Meds: acetaminophen **OR** acetaminophen, bisacodyl, dextrose, sodium chloride flush, sodium chloride, ondansetron **OR** ondansetron, senna, polyethylene glycol, glucose, dextrose, glucagon (rDNA), dextrose, albuterol sulfate HFA      Intake/Output Summary (Last 24 hours) at 1/5/2022 1097  Last data filed at 1/5/2022 0101  Gross per 24 hour   Intake 180 ml   Output 1100 ml   Net -920 ml       Diet:  ADULT DIET; Dysphagia - Soft and Bite Sized    Exam:  /85   Pulse 71   Temp 96.5 °F (35.8 °C) (Axillary)   Resp 20   Ht 5' 2\" (1.575 m)   Wt 182 lb 14.4 oz (83 kg)   SpO2 94%   BMI 33.45 kg/m²     General appearance: No apparent distress, appears stated age and cooperative. Extremely weak  HEENT: Pupils equal, round, and reactive to light. Conjunctivae/corneas clear. Weak, hoarse voice  Neck: Supple, with full range of motion. No jugular venous distention. Trachea midline. Respiratory:  Normal respiratory effort. Diminished to auscultation, bilaterally without Rales/Wheezes/Rhonchi. Cardiovascular: Regular rate and rhythm with normal S1/S2 without murmurs, rubs or gallops. Abdomen: Soft, non-tender, non-distended with normal bowel sounds. Obese  Musculoskeletal: passive and active ROM x 4 extremities. Skin: Skin color, texture, turgor normal.    Neurologic:  Neurovascularly intact without any focal sensory/motor deficits.  Cranial nerves: II-XII intact, grossly non-focal.  Psychiatric: Alert and oriented x 4, thought content appropriate  Capillary Refill: Brisk,< 3 seconds   Peripheral Pulses: +2 palpable, equal bilaterally       Labs:   Recent Labs     01/03/22  0403 01/05/22  0600   WBC 15.7* 12.4*   HGB 13.2 13.8   HCT 38.1 39.2    188     Recent Labs     01/03/22  0403   *   K 3.5   CL 96*   CO2 27   BUN 39*   CREATININE 0.5   CALCIUM 8.4*     Radiology:  CTA CHEST W WO CONTRAST    Result Date: 12/18/2021  PROCEDURE: CTA CHEST W WO CONTRAST CLINICAL INFORMATION: shortness of breath. COMPARISON: No prior study. TECHNIQUE: 3 mm axial images were obtained through the chest after the administration of IV contrast.  A non-contrast localizer was obtained. 3D reconstructions were performed on the scanner to include MIP coronal and sagittal images through the chest. Isovue was the intravenous contrast utilized. All CT scans at this facility use dose modulation, iterative reconstruction, and/or weight-based dosing when appropriate to reduce radiation dose to as low as reasonably achievable. FINDINGS: There is adequate opacification of the pulmonary arterial system. No pulmonary emboli are present. There is atherosclerotic calcification in the aortic arch. . There is mild dilatation of the ascending aorta which measures 3.1 cm in diameter. There is mild cardiomegaly. There is no pericardial or pleural effusion. There is no mediastinal, axillary or hilar adenopathy. There are areas of abnormal density throughout both lung fields consistent with inflammatory process, presumably Covid 19 infection. . There is thoracic spondylosis. .  There is hepatosplenomegaly. .     1. No evidence of pulmonary embolism. 2. Areas of abnormal density throughout both lung fields consistent with inflammatory process, presumably Covid 19 infection. 3. Mild cardiomegaly. 4. Mild dilatation of the ascending aorta. 5. Thoracic spondylosis. 6. Hiatal hernia. 7. Hepatosplenomegaly. **This report has been created using voice recognition software. It may contain minor errors which are inherent in voice recognition technology. ** Final report electronically signed by DR Luan Kay on 12/18/2021 3:02 PM    XR CHEST PORTABLE    Result Date: 12/18/2021  PROCEDURE: XR CHEST PORTABLE CLINICAL INFORMATION: SOB COMPARISON: None TECHNIQUE: AP portable chest radiograph performed.  FINDINGS: Diffuse groundglass opacification of both lungs with areas of interstitial thickening and nodular consolidative opacities. Cardiac silhouette is mildly enlarged. No pleural effusion. No pneumothorax. No acute bony abnormality. Degenerative changes of the thoracic spine. 1. Diffuse groundglass opacification of both lungs with areas of interstitial thickening and nodular consolidative opacities. Findings likely relate to multilobar pneumonia. Pulmonary edema could also give this appearance. **This report has been created using voice recognition software. It may contain minor errors which are inherent in voice recognition technology. ** Final report electronically signed by Dr Trupti Mccarthy on 12/18/2021 2:20 PM      DVT prophylaxis: [x] Lovenox                                 [] SCDs                                 [] SQ Heparin                                 [] Encourage ambulation           [] Already on Anticoagulation     Code Status: Full Code    PT/OT Eval Status:  On case    Tele:   [] yes             [x] no    Active Hospital Problems    Diagnosis Date Noted    Acute respiratory failure with hypoxia ([facility] [J96.01]     Hypoxia [R09.02]     COVID-19 [U07.1] 12/18/2021       Electronically signed by WYATT Helms CNP on 1/5/2022 at 7:22 AM

## 2022-01-05 NOTE — PROGRESS NOTES
[address] [address]  INPATIENT OCCUPATIONAL THERAPY  [facility] MED SURG 8B  EVALUATION    Time:    Time In: 0900  Time Out: 0930  Timed Code Treatment Minutes: 20 Minutes  Minutes: 30          Date: 2022  Patient Name: Aries Arreola,   Gender: female      MRN: [id number]  : 1950  (73 y.o.)  Referring Practitioner: WYATT Swain CNP  Diagnosis: hypoxia  Additional Pertinent Hx: Shanti Russo is a 51-year-old white female who presented to [facility] on 2021 with complaints of shortness of breath she has a past medical history lifetime non-smoker, CAD with stent placement in , hypertension, diabetes mellitus, dyslipidemia. Per report she presented to [facility] on  with complaints of hypoxia and shortness of breath. She was initially placed on 4 L nasal cannula.  patient had persistent hypoxia and was transitioned to high flow nasal cannula. On  patient was transitioned to BiPAP and transferred to [facility] ICU for intubation. Pt extubated . Restrictions/Precautions:  Restrictions/Precautions: Fall Risk,Isolation    Subjective  Chart Reviewed: Yes,Orders,Progress Notes  Patient assessed for rehabilitation services?: Yes    Subjective: RN okayed session. Pt was resting in bed upon arrival, both dtrs present. Pleasant and agreeable to OT. Pt perseverating on charging phone and being hungry throughout session; therefore limiting overall engagement in task. Pain:  Pain Assessment  Patient Currently in Pain: Denies    Vitals: Oxygen: Pt on 1L upon arrival, resting sats at 92%, decreasing to 85% while seated on EOB. O2 titrated to 2L; however, pt required significantly increased time to recover O2 sats.      Social/Functional History:  Lives With: Spouse  Type of Home: House  Home Layout: One level  Home Equipment:  (None)           ADL Assistance: Independent  Homemaking Assistance: Independent  Ambulation Assistance: Independent  Transfer Assistance: Independent    Active : Yes     Additional Comments: Pt fully I prior to admission, 6 kids    VISION:WFL    HEARING:  WFL    COGNITION: Slow Processing, Decreased Recall, Decreased Insight, Decreased Problem Solving, Decreased Safety Awareness, Impaired Attention and Impulsive    RANGE OF MOTION:  Bilateral Upper Extremity:  limited by severe weakness, unable to achieve full AROM    STRENGTH:  Bilateral Upper Extremity:  grossly 2/5    SENSATION:   WFL    ADL:   Feeding: Maximum Assistance. for bringing food from plate to mouth with dtrs assist. Dtrs educated on encouraging pt to feed self as able, educated on provided active assist  Grooming: Maximum Assistance. for brushing hair  Lower Extremity Dressing: Dependent. for donning slipper socks. BALANCE:  Sitting Balance:  Maximum Assistance. for sitting EOB x5 min. Pt noted to fatigue very quickly, demoing posterior lean with increased fatigue. Pt's O2 around 85% while seated EOB requiring titration. VC for engaging core. Pt perseverating on charging phone and being hungry while seated EOB limiting engagement in task. VC for attention to sitting balance- briefly improving with improved attention. BED MOBILITY:  Rolling to Left: Maximum Assistance, X 2 pt able to grasp hand rail with A to guide hand and VC  Supine to Sit: Maximum Assistance, X 2 with VC for pt to engage UB and core  Sit to Supine: Dependent, X 2 x1 to guide BLE and x1 to guide UB  Scooting: Maximum Assistance      TRANSFERS:  YINA    Activity Tolerance:  Patient tolerance of  treatment: fair. Limited by weakness and decreased attention to task      Assessment:  Assessment: Pt presented with the listed deficits s/p admission with hypoxia. Pt with decreased strength, endurance, and activity tolerance and currently requires Max A for sitting EOB and demos a decreased activity tolerance, requiring Max A for ADLs.  Pt would benefit from continued OT to restore PLOF maximize pt's indep with ADLs and IADLs in prep for a safe return home at max level of indep. Performance deficits / Impairments: Decreased functional mobility ,Decreased safe awareness,Decreased balance,Decreased ADL status,Decreased endurance,Decreased high-level IADLs,Decreased strength  Prognosis: Fair  REQUIRES OT FOLLOW UP: Yes  Decision Making: Medium Complexity  Safety Devices in place: Yes  Type of devices: All fall risk precautions in place,Call light within reach,Left in bed,Bed alarm in place    Treatment Initiated: Treatment and education initiated within context of evaluation. Evaluation time included review of current medical information, gathering information related to past medical, social and functional history, completion of standardized testing, formal and informal observation of tasks, assessment of data and development of plan of care and goals. Treatment time included skilled education and facilitation of tasks to increase safety and independence with ADL's for improved functional independence and quality of life. Discharge Recommendations:  Continue to assess pending progress (Pt presents with signficant weakness and decreased endurance. Pt is not safe to return home at this time and would highly benefit from and inpatient stay to receive additional therapy services prior to discharge home.)    Patient Education:  OT Education: OT Role,Plan of Care,ADL Adaptive Strategies,Transfer Training,Energy Conservation,IADL Safety    Equipment Recommendations:  Equipment Needed:  (defer to next level of care)    Plan:  Times per week: 5x  Times per day: Daily  Current Treatment Recommendations: Strengthening,Balance Training,Functional Mobility Training,Endurance Training,Home Management Training,Patient/Caregiver Education & Training,Self-Care / ADL,Safety Education & Training. See long-term goal time frame for expected duration of plan of care.   If no long-term goals established, a short length of stay is anticipated. Goals:  Patient goals : get stronger  Short term goals  Time Frame for Short term goals: by discharge  Short term goal 1: Pt will tolerate sitting on EOB >10 min with no > than Min A x1 and min VC while maintaining O2 >90% for increased indep with ADLs  Short term goal 2: Pt will complete EOB ADLs with Mod A for AAROM and min VC for increased indep with grooming  Short term goal 3: Pt will complete BUE AROM/AAROM with no > than Min A and min VC to increase UB strength and endurance to increase indep with ADLs  Short term goal 4: Pt will tolerate OTR to assess t/fs and mobility when appropriate         Following session, patient left in safe position with all fall risk precautions in place.

## 2022-01-05 NOTE — PROCEDURES
A Bladder scan was performed at 0115  . The residual amount was measured to be 28 ML. Report of results was given to [facility].

## 2022-01-05 NOTE — PROGRESS NOTES
[address] [address]  INPATIENT PHYSICAL THERAPY  DAILY NOTE  [facility] MED SURG 8B - 8B-37/037-A    Time In: 0900  Time Out: 0930  Timed Code Treatment Minutes: 30 Minutes  Minutes: 30      **Co-tx with OT due to medical complexity, requires +2 skilled assist    Date: 2022  Patient Name: Mechelle Nye,  Gender:  female        MRN: [id number]  : 1950  (73 y.o.)     Referring Practitioner: Lauar Cronin CNP  Diagnosis: Hypoxia  Additional Pertinent Hx: Nisha Puri is a 72-year-old white female who presented to [facility] on 2021 with complaints of shortness of breath she has a past medical history lifetime non-smoker, CAD with stent placement in , hypertension, diabetes mellitus, dyslipidemia. Per report she presented to [facility] on  with complaints of hypoxia and shortness of breath. She was initially placed on 4 L nasal cannula.  patient had persistent hypoxia and was transitioned to high flow nasal cannula. On  patient was transitioned to BiPAP and transferred to [facility] ICU for intubation. Pt extubated 1. Prior Level of Function:  Lives With: Spouse  Type of Home: House  Home Layout: One level  Home Equipment:  (None)        ADL Assistance: Independent  Homemaking Assistance: Independent  Ambulation Assistance: Independent  Transfer Assistance: Independent  Active : Yes  Additional Comments: Pt fully I prior to admission, 6 kids    Restrictions/Precautions:  Restrictions/Precautions: Fall Risk,Isolation     SUBJECTIVE: Pt is supine in bed, on 1 L O2, pleasant and agreeable to PT/OT. 2 daughters present and supportive. Pt continues to be confused, easily distracted and perseverating on eating and needing her cell phone charged, cues for redirection. O2 titrated to 2 L with activity and left on 2 L due to sats at 90%.     PAIN: denies    Vitals: Oxygen: 94% at rest on 1 L, 85% EOB, titrated to 2 L to keep sats >90%    OBJECTIVE:  Bed Mobility:  Rolling to Left: Maximum Assistance, X 2   Supine to Sit: Maximum Assistance, X 2  Sit to Supine: Maximum Assistance, X 2   Scooting: Maximum Assistance, X 2    Balance:  Static Sitting Balance:  Maximum Assistance  **Pt sits EOB x 5 minutes, posterior lean    Functional Outcome Measures: Completed  AM-PAC Inpatient Mobility Raw Score : 6  AM-PAC Inpatient T-Scale Score : 23.55     ASSESSMENT:  Assessment: Patient progressing toward established goals. Activity Tolerance:  Patient tolerance of  treatment: good. Equipment Recommendations: Other: will monitor for needs pending progress and DC destination  Discharge Recommendations: Inpatient Rehabilitation  Plan: Times per week: 5x C  Current Treatment Recommendations: Strengthening,Positioning,Patient/Caregiver Education & Training,Safety Education & Training,Balance Training,Endurance Training,Functional Mobility Training,Neuromuscular Re-education    Patient Education  Patient Education: Family Education, [employer] Mobility, Transfers    Goals:  Patient goals : to get stronger  Short term goals  Time Frame for Short term goals: by discharge  Short term goal 1: Pt to roll L and R In bed with mod A to improve bed mobility. Short term goal 2: Pt to transfer supine <--> sit min A to enable pt to get in/out of bed. Short term goal 3: Pt to sit EOB with SBA x 10 minutes for improved upright tolerance in prep for transfers/gait. Short term goal 4: PT to assess transfers/gait. Long term goals  Time Frame for Long term goals : NA due to short length of stay. Following session, patient left in safe position with all fall risk precautions in place.

## 2022-01-05 NOTE — PROGRESS NOTES
Pharmacy Note  [facility] Result    Artist Fan is a 73 y.o. female, with a positive blood culture result    [employer] message received from, laboratory employee on 1/5/2022 at 4:43 PM    First Gram stain result: gram positive cocci in clusters    [facility] BCID result: Staphylococcus (NOT aureus) mecA detected    [employer] BCID and gram stain congruent? Yes    Suspected source? ? contaminant    Patient chart has been reviewed for signs/symptoms of infection: Yes  BP (!) 105/59   Pulse 82   Temp 98.6 °F (37 °C) (Oral)   Resp 24   Ht 5' 2\" (1.575 m)   Wt 182 lb 14.4 oz (83 kg)   SpO2 95%   BMI 33.45 kg/m²   Lab Results   Component Value Date    WBC 12.4 (H) 01/05/2022     Allergies reviewed  Sulfa antibiotics and Codeine    Renal function reviewed  Estimated Creatinine Clearance: 106 mL/min (based on SCr of 0.5 mg/dL). Current antibiotic regimen: none    Intervention needed: Yes    Individual contacted: Provider Jordana Cr    Recommendations: Likely contaminant; however, second blood culture growing this organism so informed provider    Recommendations accepted?  Awaiting response    Cheo Rubalcava, [facility]  1/5/2022 4:43 PM

## 2022-01-06 LAB
BLOOD CULTURE, ROUTINE: ABNORMAL
BLOOD CULTURE, ROUTINE: ABNORMAL
GLUCOSE BLD-MCNC: 211 MG/DL (ref 70–108)
GLUCOSE BLD-MCNC: 220 MG/DL (ref 70–108)
GLUCOSE BLD-MCNC: 231 MG/DL (ref 70–108)
GLUCOSE BLD-MCNC: 279 MG/DL (ref 70–108)
ORGANISM: ABNORMAL

## 2022-01-06 PROCEDURE — 6370000000 HC RX 637 (ALT 250 FOR IP): Performed by: NURSE PRACTITIONER

## 2022-01-06 PROCEDURE — 82948 REAGENT STRIP/BLOOD GLUCOSE: CPT

## 2022-01-06 PROCEDURE — 99232 SBSQ HOSP IP/OBS MODERATE 35: CPT | Performed by: PHYSICIAN ASSISTANT

## 2022-01-06 PROCEDURE — 97530 THERAPEUTIC ACTIVITIES: CPT

## 2022-01-06 PROCEDURE — 1200000000 HC SEMI PRIVATE

## 2022-01-06 PROCEDURE — 97112 NEUROMUSCULAR REEDUCATION: CPT

## 2022-01-06 PROCEDURE — 6360000002 HC RX W HCPCS: Performed by: PHYSICIAN ASSISTANT

## 2022-01-06 PROCEDURE — 99223 1ST HOSP IP/OBS HIGH 75: CPT | Performed by: PHYSICAL MEDICINE & REHABILITATION

## 2022-01-06 RX ADMIN — ACETAMINOPHEN 650 MG: 325 TABLET ORAL at 16:16

## 2022-01-06 RX ADMIN — ENOXAPARIN SODIUM 30 MG: 100 INJECTION SUBCUTANEOUS at 12:51

## 2022-01-06 RX ADMIN — FAMOTIDINE 20 MG: 20 TABLET ORAL at 20:17

## 2022-01-06 RX ADMIN — INSULIN LISPRO 4 UNITS: 100 INJECTION, SOLUTION INTRAVENOUS; SUBCUTANEOUS at 12:47

## 2022-01-06 RX ADMIN — ENOXAPARIN SODIUM 30 MG: 100 INJECTION SUBCUTANEOUS at 00:35

## 2022-01-06 RX ADMIN — INSULIN LISPRO 4 UNITS: 100 INJECTION, SOLUTION INTRAVENOUS; SUBCUTANEOUS at 18:16

## 2022-01-06 RX ADMIN — INSULIN LISPRO 4 UNITS: 100 INJECTION, SOLUTION INTRAVENOUS; SUBCUTANEOUS at 09:31

## 2022-01-06 RX ADMIN — ROSUVASTATIN 10 MG: 10 TABLET, FILM COATED ORAL at 20:17

## 2022-01-06 RX ADMIN — INSULIN GLARGINE 20 UNITS: 100 INJECTION, SOLUTION SUBCUTANEOUS at 09:30

## 2022-01-06 RX ADMIN — ASPIRIN 81 MG CHEWABLE TABLET 81 MG: 81 TABLET CHEWABLE at 09:53

## 2022-01-06 RX ADMIN — FUROSEMIDE 40 MG: 40 TABLET ORAL at 09:49

## 2022-01-06 RX ADMIN — FAMOTIDINE 20 MG: 20 TABLET ORAL at 09:53

## 2022-01-06 RX ADMIN — ACETAMINOPHEN 650 MG: 325 TABLET ORAL at 20:17

## 2022-01-06 RX ADMIN — INSULIN LISPRO 4 UNITS: 100 INJECTION, SOLUTION INTRAVENOUS; SUBCUTANEOUS at 22:00

## 2022-01-06 ASSESSMENT — PAIN SCALES - GENERAL
PAINLEVEL_OUTOF10: 0
PAINLEVEL_OUTOF10: 8
PAINLEVEL_OUTOF10: 0
PAINLEVEL_OUTOF10: 5
PAINLEVEL_OUTOF10: 0
PAINLEVEL_OUTOF10: 0

## 2022-01-06 ASSESSMENT — PAIN DESCRIPTION - LOCATION: LOCATION: HIP

## 2022-01-06 ASSESSMENT — PAIN SCALES - WONG BAKER: WONGBAKER_NUMERICALRESPONSE: 6

## 2022-01-06 NOTE — CONSULTS
COVID isolation today, 1/6/22. This am she was weaned to room air and was with O2 sat at 96%. Afebrile. BP 90/72. Current Rehabilitation Assessments:  PT:      OBJECTIVE:  Bed Mobility:  Rolling to Left: mod x 1 and min A x1, HOB flat, use of bed rail for support, pt completed rolling to change melissa pad  Rolling to Right: mod A x1, min A x1, HOB flat, use of bed rail for support   Supine to Sit: Maximum Assistance, X 2, with head of bed raised, with rail, with verbal cues , with increased time for completion  Sit to Supine: Maximum Assistance, X 2, with head of bed raised, with rail, with verbal cues , with increased time for completion   Cues for proper technique of completion with some initiation during mobility  Pt required max A x2 for scooting self towards [facility] for comfort. Transfers:  Not Tested  Not safe at this time  Ambulation:  Not Tested  Not able at this time  Balance:  Static Sitting Balance:  Maximum Assistance, X 1, with cues for safety, with verbal cues   Tolerated about 11 min sitting EOB, B UE support required. Pt instructed in reaching for various items during session to help to increase core strength and balance. Pt also instructed in washing face and combing hair with TD for completion due to increased weakness.      Functional Outcome Measures: Completed  AM-PAC Inpatient Mobility Raw Score : 6  AM-PAC Inpatient T-Scale Score : 23.55     ASSESSMENT:  Assessment: Patient progressing toward established goals. Activity Tolerance:  Patient tolerance of  treatment: fair. Fatigue and reduced endurance     OT:      Vitals: Patient on 2 L O2 via Nasal Canula  upon arrival to room. Patient O2 sats at 95 %. Upon activity patient maintaining O2 at 93 %. Pt requiring 0 rest break(s) to recover.         COGNITION: Slow Processing, Decreased Insight, Impaired Memory, Decreased Problem Solving and Impaired Attention     ADL:   Feeding: Minimal Assistance.   A for bringing cup to mouth  Grooming: Maximum Assistance. for washing face and combing hair.     BALANCE:  Sitting Balance:  Maximum Assistance, X 1. at EOB x 11 minutes with 1-2 UE support while completing dynamic forward reaching     BED MOBILITY:  Supine to Sit: Maximum Assistance, X 2 HOB Elevated used bedrail  Sit to Supine: Maximum Assistance, X 2 HOB flat, used bedrail  Rolling: Mod x 1 and Min x 1 HOB flat, used bedrail      ST:      Diagnosis: Hypoxia  Secondary Diagnosis: Dysphagia, COVID-19  Precautions: Aspiration Risk   History of Present Illness/Injury: Patient admitted with above diagnosis. Per chart review, \"Kaleigh Sierra is a 65-year-old white female who presented to [facility] on 12/18/2021 with complaints of shortness of breath she has a past medical history lifetime non-smoker, CAD with stent placement in 2008, hypertension, diabetes mellitus, dyslipidemia.  Per report she presented to [facility] on 12/18 with complaints of hypoxia and shortness of breath.  She was initially placed on 4 L nasal cannula.  12/19 patient had persistent hypoxia and was transitioned to high flow nasal cannula.  On 12/20 patient was transitioned to BiPAP and transferred to [facility] ICU for intubation. She was intubated on 12/20/2021 and extubated on 1/2/2022. \" ST recommended completion of MBS to further assess pharyngeal swallow function d/t heightened risk of dysphagia and silent aspiration s/t prolonged intubation and aphonic voicing.      has no past medical history on file.      Current Diet:  NPO with exceptions for ice chips and sips of water pending MBS      Pain: No pain reported.     SUBJECTIVE:  Patient arrived to fluoro suite via bed, awake and alert with intermittent confusion presents.  Patient with adequate alertness and engagement throughout study.      OBJECTIVE:     Respiratory Status:  Nasal Canula (3L)     Behavioral Observation:  Alert, Oriented and Intermittent Confusion     PATIENT WAS EVALUATED USING: BARIUM: thin liquids via tsp/cup/straw, puree, soft texture, mixed consistency, and hard/coarse texture      ORAL PREPARATION PHASE:  Impaired:  Slow Mastication, Uncoordinated Mastication, Decreased Bolus Control and Decreased Bolus Formation     ORAL PHASE: Uncoordinated AP Movement, Slow AP Movement and Oral Residue            ORAL PHASE ALISON SCORE: (Dysphagia outcome and severity scale)  5 = Mild Dysphagia - May have one diet consistency restricted - Mild oral residue but clears     PHARYNGEAL PHASE:  Impaired: Delayed Swallow, Decreased Tongue Based Retraction, Residue in the Valleculae, Residue in the Pyriform Sinus, Residue Along the Posterior Pharyngeal Wall and Decreased Pharyngeal Constriction       PHARYNGEAL PHASE ALISON SCORE: (Dysphagia outcome and severity scale)  4 = Mild-Moderate Dysphagia - One or two consistencies restricted - may exhibit one or more of the following: Residue clears with cue, Aspiration of one consistency with weak or no reflexive cough, Laryngeal penetration to the vocal cords with cough with two consistencies, Laryngeal penetration to the vocal cords without cough on one consistency     EVIDENCE FOR LARYNGEAL PENETRATION AND/OR ASPIRATION:  No evidence of aspiration  Laryngeal penetration evident with thin liquids      PENETRATION-ASPIRATION SCALE (PAS):   Thin Liquids: 2 = Material enters the airway, remains above vocal folds, and is ejected from the airway  Puree:  1 = Material does not enter the airway  Soft Solid:  1 = Material does not enter the airway  Hard Solid: 1 = Material does not enter the airway     ESOPHAGEAL PHASE:          No significant findings     ATTEMPTED TECHNIQUES:  Small Bolus Size Effective     Straw Effective     Cup Effective     Chin Tuck Not Attempted     Head Turn Not Attempted     Spoon Presentations Effective     Volitional Cough Not Attempted     Spontaneous Cough Not Attempted                DIAGNOSTIC IMPRESSIONS:  Patient presents with mild oral dysphagia and mild-moderate pharyngeal dysphagia based on skilled clinical findings outlined above. Patient demonstrated evidence of slow/uncoordinated mastication with effective textural breakdown when given additional time, decreased bolus formation, and slow/uncoordinated ap transit resulting in mild oral residue that cleared with additional swallow. Patient with decreased bolus control resulting in premature spillage into the pharynx to the level of the vallecula and pyriforms which was further compounded by delayed swallow initiation. Patient with adequate hyolaryngeal elevation/excursion resulting in complete epiglottic inversion and adequate thyrohyoid approximation; however, patient with intermittent trace, transient penetration occurring DURING the swallow with thin liquids. No aspiration observed this date. Patient with decreased base of tongue retraction and pharyngeal constriction resulting in mild pharyngeal residue remaining in the vallecula, along the posterior pharyngeal wall, and within the pyriforms. Pharyngeal residue cleared with implementation of liquid wash OR double swallow. Of note, patient with bony protrusion at C5; however, protrusion did not appear to impact bolus flow. At this time, recommend patient initiate a soft and bite-size diet with thin liquids with direct 1:1 assistance d/t reduced UE ROM/Strength and implementation of upright position, small bites/sips, alternation of bites/sips, and double swallow.     Dysphagia tx: Following completion of MBS, ST completed review of swallow anatomy and physiology via use of computer monitor. ST defined aspiration and penetration and explained risk factors associated with aspiration (pneumonia, pulmonary decline, respiratory failure, extended/frequent hospitalizations, etc). ST then reviewed results from [address] ST discussed diet recommendations as well as recommended swallow strategies to improve safety and efficiency.  Patient stated Daily  enoxaparin (LOVENOX) injection 30 mg, 30 mg, SubCUTAneous, Q12H  polyethylene glycol (GLYCOLAX) packet 17 g, 17 g, Oral, Daily PRN  glucose (GLUTOSE) 40 % oral gel 15 g, 15 g, Oral, PRN  dextrose 50 % IV solution, 12.5 g, IntraVENous, PRN  glucagon (rDNA) injection 1 mg, 1 mg, IntraMUSCular, PRN  dextrose 5 % solution, 100 mL/hr, IntraVENous, PRN  albuterol sulfate  (90 Base) MCG/ACT inhaler 2 puff, 2 puff, Inhalation, Q6H PRN    Social History:  Social History     Socioeconomic History    Marital status:      Spouse name: Not on file    Number of children: Not on file    Years of education: Not on file    Highest education level: Not on file   Occupational History    Not on file   Tobacco Use    Smoking status: Not on file    Smokeless tobacco: Not on file   Substance and Sexual Activity    Alcohol use: Not on file    Drug use: Not on file    Sexual activity: Not on file   Other Topics Concern    Not on file   Social History Narrative    Not on file     Social Determinants of Health     Financial Resource Strain:     Difficulty of Paying Living Expenses: Not on file   Food Insecurity:     Worried About Running Out of Food in the Last Year: Not on file    Benjamin of Food in the Last Year: Not on file   Transportation Needs:     Lack of Transportation (Medical): Not on file    Lack of Transportation (Non-Medical):  Not on file   Physical Activity:     Days of Exercise per Week: Not on file    Minutes of Exercise per Session: Not on file   Stress:     Feeling of Stress : Not on file   Social Connections:     Frequency of Communication with Friends and Family: Not on file    Frequency of Social Gatherings with Friends and Family: Not on file    Attends [employer] Services: Not on file    Active Member of Clubs or Organizations: Not on file    Attends Club or Organization Meetings: Not on file    Marital Status: Not on file   Intimate Partner Violence:     Fear of Current or Ex-Partner: Not on file    Emotionally Abused: Not on file    Physically Abused: Not on file    Sexually Abused: Not on file   Housing Stability:     Unable to Pay for Housing in the Last Year: Not on file    Number of Places Lived in the Last Year: Not on file    Unstable Housing in the Last Year: Not on file     Lives With: Spouse  Type of Home: House  Home Layout: [address]:  (None)   Ambulation Assistance: Independent  Transfer Assistance: Independent     Active : Yes  Additional Comments: Pt fully I prior to admission, 6 kids    Family History:   No family history on file.     Review of Systems:  Unable to complete as patient was lethargic at this time    Physical Exam:  BP (!) 89/53   Pulse 81   Temp 97.9 °F (36.6 °C) (Oral)   Resp 20   Ht 5' 2\" (1.575 m)   Wt 186 lb (84.4 kg)   SpO2 96%   BMI 34.02 kg/m²   lethargic  Orientation:   lethargic  Mood:lethargic  Affect: lethargic  General appearance: Patient is well nourished, well developed, appearing stated age    Memory:   Could not test  Attention/Concentration:  - unable to test  Language:  Unable to test    Unable to test CN, ROM, Motor, Sensory, Coordination, MSR's, gait      Heart: normal rate and regular rhythm  Lungs: expiratory wheezes  Abdomen: soft, non-tender, non-distended, normal bowel sounds, no masses or organomegaly    Skin: warm and dry  Peripheral vascular: Pulses: Normal upper and lower extremity pulses; Edema: no      Diagnostics:  Recent Results (from the past 24 hour(s))   POCT glucose    Collection Time: 01/05/22 12:24 PM   Result Value Ref Range    POC Glucose 290 (H) 70 - 108 mg/dl   POCT glucose    Collection Time: 01/05/22  6:01 PM   Result Value Ref Range    POC Glucose 323 (H) 70 - 108 mg/dl   POCT glucose    Collection Time: 01/05/22  9:39 PM   Result Value Ref Range    POC Glucose 304 (H) 70 - 108 mg/dl   POCT glucose    Collection Time: 01/06/22  8:37 AM   Result Value Ref Range    POC Glucose 220 (H) 70 - 108 mg/dl         Impression:  · COVID 19 infection; diagnosed 12/18/21 with initial symptoms on 12/12/21. · Pneumonia secondary to COVID  · Acute hypoxemic respiratory failure secondary to COVID19   · ARDS  · Bacterial pneumonia secondary to Klebsiella oxytocin  · MRSA bacteremia Rx'd with Vancomycin  · Type II diabetes hyperglycemia   · Mild oral dysphagia  · Thrombocytopenia (128)  · CKD stage II with GFR 71  · CAD with history of stent placement  · Essential hypertension  · Class II obesity with BMI of 37.73 kg/m2  · Deconditioning and Debility      Recommendations:  · Could not complete exam at this time due to patient's lethargy; will return tomorrow  · Per review of therapy notes, believe patient is a good candidate for and would benefit greatly from admission to the IPR Unit  · Continue to follow; will see again tomorrow    It was my pleasure to evaluate [address] today. Please call with questions.     Johann Galeazzi, MD

## 2022-01-06 NOTE — PROGRESS NOTES
goal 1: Pt to roll L and R In bed with mod A to improve bed mobility. Short term goal 2: Pt to transfer supine <--> sit min A to enable pt to get in/out of bed. Short term goal 3: Pt to sit EOB with SBA x 10 minutes for improved upright tolerance in prep for transfers/gait. Short term goal 4: PT to assess transfers/gait. Long term goals  Time Frame for Long term goals : NA due to short length of stay. Following session, patient left in safe position with all fall risk precautions in place.

## 2022-01-06 NOTE — PROGRESS NOTES
Pharmacy Note  [employer] Result    Kina Avery is a 73 y.o. female, with a positive blood culture result    [employer] message received from [place], laboratory employee on 1/5/2022 at 4:29 PM    First Gram stain result: gram positive cocci in clusters    [facility] BCID  result: Staphylococcus (NOT aureus) mecA detected    [facility] BCID and gram stain congruent? Yes    Suspected source? Possible contaminant 1 out of 2 blood cultures    Patient chart has been reviewed for signs/symptoms of infection: Yes  BP (!) 105/59   Pulse 82   Temp 98.6 °F (37 °C) (Oral)   Resp 24   Ht 5' 2\" (1.575 m)   Wt 182 lb 14.4 oz (83 kg)   SpO2 95%   BMI 33.45 kg/m²   Lab Results   Component Value Date    WBC 12.4 (H) 01/05/2022     Allergies reviewed  Sulfa antibiotics and Codeine    Renal function reviewed  Estimated Creatinine Clearance: 106 mL/min (based on SCr of 0.5 mg/dL). Current antibiotic regimen: none    Intervention needed: No    Individual contacted: Provider Gladys Padilla    Recommendations: no antibiotics, continue to monitor for S/S infection     Recommendations accepted?  Yes    Crystal Kapadia, PharmD 1/5/2022  8:47 PM

## 2022-01-06 NOTE — PROGRESS NOTES
[facility] 8B  Occupational Therapy  Daily Note  Time:   Time In: 7946  Time Out: 0901  Timed Code Treatment Minutes: 29 Minutes  Minutes: 29      Cotx completed due to weakness and medical complexity    Date: 2022  Patient Name: Tone Vera,   Gender: female      Room: -37/037-A  MRN: [id number]  : 1950  (73 y.o.)  Referring Practitioner: WYATT Whatley CNP  Diagnosis: hypoxia  Additional Pertinent Hx: Gem Stroud is a 68-year-old white female who presented to [facility] on 2021 with complaints of shortness of breath she has a past medical history lifetime non-smoker, CAD with stent placement in , hypertension, diabetes mellitus, dyslipidemia. Per report she presented to [facility] on  with complaints of hypoxia and shortness of breath. She was initially placed on 4 L nasal cannula.  patient had persistent hypoxia and was transitioned to high flow nasal cannula. On  patient was transitioned to BiPAP and transferred to [facility] ICU for intubation. Pt extubated . Restrictions/Precautions:  Restrictions/Precautions: Fall Risk,Isolation     SUBJECTIVE: RN approved session, In bed upon arrival, agreeable to OT session    PAIN: 9/10:     Vitals: Patient on 2 L O2 via Nasal Canula  upon arrival to room. Patient O2 sats at 95 %. Upon activity patient maintaining O2 at 93 %. Pt requiring 0 rest break(s) to recover. COGNITION: Slow Processing, Decreased Insight, Impaired Memory, Decreased Problem Solving and Impaired Attention    ADL:   Feeding: Minimal Assistance. A for bringing cup to mouth  Grooming: Maximum Assistance. for washing face and combing hair.     BALANCE:  Sitting Balance:  Maximum Assistance, X 1. at EOB x 11 minutes with 1-2 UE support while completing dynamic forward reaching    BED MOBILITY:  Supine to Sit: Maximum Assistance, X 2 HOB Elevated used bedrail  Sit to Supine: Maximum Assistance, X 2 HOB flat, used bedrail  Rolling: Mod x 1 and Min x 1 HOB flat, used bedrail  ASSESSMENT:     Activity Tolerance:  Patient tolerance of  treatment: fair. Limited by fatigue      Discharge Recommendations: Continue to assess pending progress  Equipment Recommendations: Equipment Needed:  (defer to next level of care)  Plan: Times per week: 5x  Times per day: Daily  Current Treatment Recommendations: Strengthening,Balance Training,Functional Mobility Training,Endurance Training,Home Management Training,Patient/Caregiver Education & Training,Self-Care / ADL,Safety Education & Training    Patient Education  Patient Education: ADL's    Goals  Short term goals  Time Frame for Short term goals: by discharge  Short term goal 1: Pt will tolerate sitting on EOB >10 min with no > than Min A x1 and min VC while maintaining O2 >90% for increased indep with ADLs  Short term goal 2: Pt will complete EOB ADLs with Mod A for AAROM and min VC for increased indep with grooming  Short term goal 3: Pt will complete BUE AROM/AAROM with no > than Min A and min VC to increase UB strength and endurance to increase indep with ADLs  Short term goal 4: Pt will tolerate OTR to assess t/fs and mobility when appropriate    Following session, patient left in safe position with all fall risk precautions in place.

## 2022-01-06 NOTE — CARE COORDINATION
Discharge Planning Update: Following Covid. Pt is noted to be out of Covid isolation today. Baricitinib completed. Had Tocilizumab on 1-2. Extubated on 1-2. Acapella and IS. This am has been weaned to room air and is with O2 sat at 96%. Afebrile. BP 90/72. IPR has been consulted for possible admission post discharge. PT/OT following. UPDATE:  Pt accepted for admission to  surg overflow for IPR. Will go to 8E 71 today if attending feels pt is ready. Jerad with IPR will notify primary RN.

## 2022-01-06 NOTE — PROGRESS NOTES
Please Transfer patient to surge overflow unit on 8E. TRANSFER--NOT DISCHARGE/READMIT to [id number]. Please call [phone] for report. Spoke with primary RN Mukul De Los Santos about patient TRANSFER, [facility] Supervisor Kierra Keene, the [facility] notified of this TRANSFER as well as 8E staff.

## 2022-01-06 NOTE — PROGRESS NOTES
facility as she preferred to be admitted to [address] [place] [address] for treatment and family close by. She states she has felt okay in spite of measured hypoxia. She denies fevers, chills, chest pain, dizziness, numbness or tingling, abdominal pain      ED course:   Vitals on arrival 98.5F, RR 28, HR 95, /77  Labs: Lactic acid, CBC, ABG, Ferritin, CRP   EKG, CXR, CTA chest\"     12/19--> now on high flow however settings not documented as patient desatted to 83% on 6 L; stop IV fluids     12/20-->on BiPAP now despite being maxed out on high flow and was tachypneic; palliative care saw patient and she does want to be a full code and is okay with intubation     1/4->-patient was intubated on 12/20 and was extubated on 1/2; she was transferred out to stepdown on 1/3; she is hemodynamically stable, she is currently on 3 L of oxygen; speech therapy is planning a modified barium swallow today; patient is fully alert and oriented, in no distress, she is extremely weak and has a very weak voice     1/5--> hemodynamically stable, on 2 L of oxygen satting 94%; patient is alert and oriented however needs frequent redirection with her phone, she is asking for her coat \"to get out of here\"    Subjective (past 24 hours):   No acute events overnight. Pt states that her breathing is better today. Pt states that she worked with therapy and did well. She still states that she is extremely weak. Pt has no issues or concerns at this time. Educated patient on [address] [place]. Past medical history, family history, social history and allergies reviewed again and is unchanged since admission. ROS (All review of systems completed. Pertinent positives noted.  Otherwise All other systems reviewed and negative.)     Medications:  Reviewed    Infusion Medications    sodium chloride 25 mL (01/03/22 9371)    dextrose       Scheduled Medications    insulin glargine  20 Units SubCUTAneous QAM AC    insulin lispro  0-12 Units SubCUTAneous TID WC    insulin lispro  0-6 Units SubCUTAneous Nightly    famotidine  20 mg Oral BID    furosemide  40 mg Oral Daily    rosuvastatin  10 mg Oral Daily    aspirin  81 mg Per NG tube Daily    enoxaparin  30 mg SubCUTAneous Q12H     PRN Meds: acetaminophen **OR** acetaminophen, bisacodyl, dextrose, sodium chloride flush, sodium chloride, ondansetron **OR** ondansetron, senna, polyethylene glycol, glucose, dextrose, glucagon (rDNA), dextrose, albuterol sulfate HFA      Intake/Output Summary (Last 24 hours) at 1/6/2022 0920  Last data filed at 1/6/2022 0357  Gross per 24 hour   Intake --   Output 1375 ml   Net -1375 ml       Diet:  ADULT DIET; Dysphagia - Soft and Bite Sized    Exam:  /78   Pulse 74   Temp 97.9 °F (36.6 °C) (Oral)   Resp 18   Ht 5' 2\" (1.575 m)   Wt 186 lb (84.4 kg)   SpO2 95%   BMI 34.02 kg/m²   General appearance: No apparent distress, appears stated age and cooperative. HEENT: Pupils equal, round, and reactive to light. Conjunctivae/corneas clear. Neck: Supple, with full range of motion. No jugular venous distention. Trachea midline. Respiratory:  Normal respiratory effort on NC. Breath sounds diminished. Cardiovascular: Regular rate and rhythm with normal S1/S2 without murmurs, rubs or gallops. Abdomen: Soft, non-tender, non-distended with normal bowel sounds. Musculoskeletal: passive and active ROM x 4 extremities. Skin: Skin color, texture, turgor normal.  No rashes or lesions. Neurologic:  Neurovascularly intact without any focal sensory/motor deficits. Cranial nerves: II-XII intact, grossly non-focal.  Psychiatric: Alert and oriented, thought content appropriate, normal insight  Capillary Refill: Brisk,< 3 seconds   Peripheral Pulses: +2 palpable, equal bilaterally     Labs:   Recent Labs     01/05/22  0600   WBC 12.4*   HGB 13.8   HCT 39.2        No results for input(s): NA, K, CL, CO2, BUN, CREATININE, CALCIUM, PHOS in the last 72 hours.     Invalid input(s): MOLLY  No results for input(s): AST, ALT, BILIDIR, BILITOT, ALKPHOS in the last 72 hours. No results for input(s): INR in the last 72 hours. No results for input(s): Robert Snuffer in the last 72 hours. Microbiology:    Blood culture #1:   Lab Results   Component Value Date    BC No growth-preliminary  01/02/2022    BC No growth-preliminary  01/02/2022     Urine culture:   Lab Results   Component Value Date    LABURIN No growth-preliminary No growth  12/20/2021     Urinalysis:      Lab Results   Component Value Date    NITRU NEGATIVE 12/31/2021    WBCUA 0-2 12/31/2021    BACTERIA NONE SEEN 12/31/2021    RBCUA 10-15 12/31/2021    BLOODU LARGE 12/31/2021    SPECGRAV 1.026 12/31/2021       Radiology:  FL MODIFIED BARIUM SWALLOW W VIDEO   Final Result   Laryngeal penetration without aspiration with thin liquids. Recommendations and comments available from speech therapy            **This report has been created using voice recognition software. It may contain minor errors which are inherent in voice recognition technology. **      Final report electronically signed by Dr. Albaro Avila on 1/4/2022 11:34 AM      XR CHEST PORTABLE   Final Result   Stable mild bilateral patchy pulmonary opacities. **This report has been created using voice recognition software. It may contain minor errors which are inherent in voice recognition technology. **      Final report electronically signed by Dr. Yaw Chavez on 1/1/2022 7:25 AM      VL DUP LOWER EXTREMITY ARTERIES LEFT   Final Result   No significant stenosis or occlusion. **This report has been created using voice recognition software. It may contain minor errors which are inherent in voice recognition technology. **      Final report electronically signed by Dr. Albaro Avila on 12/30/2021 3:17 PM      XR CHEST PORTABLE   Final Result   No interval change.       This document has been electronically signed by: Adilene Dalton DO on    12/28/2021 02:39 AM      XR CHEST PORTABLE   Final Result   Impression:   Bilateral consolidations. Improving left-sided infiltrates. This document has been electronically signed by: Km Marroquin MD on    12/25/2021 05:09 AM      XR CHEST PORTABLE   Final Result   1. Support lines and tubes as outlined above, ET tube is 4.4 cm superior    to the telma. 2. Diffuse scattered infiltrates and airspace disease throughout the    bilateral lungs. More complex consolidation involving the right lung base. This document has been electronically signed by: Matt Dietz DO on    12/24/2021 03:04 AM      XR CHEST PORTABLE   Final Result   Interval placement of right-sided PICC line with catheter tip at the cavoatrial junction with stable diffuse patchy lung opacities bilaterally. **This report has been created using voice recognition software. It may contain minor errors which are inherent in voice recognition technology. **      Final report electronically signed by Dr. Jsoh Plunkett MD on 12/21/2021 7:10 PM      XR CHEST PORTABLE   Final Result   Diffuse patchy lung opacities as evidence for diffuse pneumonia or ARDS following intubation. **This report has been created using voice recognition software. It may contain minor errors which are inherent in voice recognition technology. **      Final report electronically signed by Dr. Josh Plunkett MD on 12/20/2021 7:21 PM      XR CHEST PORTABLE   Final Result   1. Marked worsening of the diffuse airspace opacities relating to multilobar pneumonia. **This report has been created using voice recognition software. It may contain minor errors which are inherent in voice recognition technology. **      Final report electronically signed by Dr Juvenal Bhatti on 12/20/2021 11:36 AM      CTA CHEST [address]   Final Result      1. No evidence of pulmonary embolism.    2. Areas of abnormal density throughout both lung fields consistent with inflammatory process, presumably Covid 19 infection. 3. Mild cardiomegaly. 4. Mild dilatation of the ascending aorta. 5. Thoracic spondylosis. 6. Hiatal hernia. 7. Hepatosplenomegaly. **This report has been created using voice recognition software. It may contain minor errors which are inherent in voice recognition technology. **      Final report electronically signed by DR Fang Murray on 12/18/2021 3:02 PM      XR CHEST PORTABLE   Final Result   1. Diffuse groundglass opacification of both lungs with areas of interstitial thickening and nodular consolidative opacities. Findings likely relate to multilobar pneumonia. Pulmonary edema could also give this appearance. **This report has been created using voice recognition software. It may contain minor errors which are inherent in voice recognition technology. **      Final report electronically signed by Dr Weston Quintero on 12/18/2021 2:20 PM        Electronically signed by MICHAEL Coronado on 1/6/2022 at 9:20 AM

## 2022-01-06 NOTE — PROGRESS NOTES
Pt removed from [facility] isolation per CDC guidelines as approved by [employer].

## 2022-01-07 LAB
ANION GAP SERPL CALCULATED.3IONS-SCNC: 9 MEQ/L (ref 8–16)
BASOPHILS # BLD: 0.2 %
BASOPHILS ABSOLUTE: 0 THOU/MM3 (ref 0–0.1)
BUN BLDV-MCNC: 28 MG/DL (ref 7–22)
CALCIUM SERPL-MCNC: 8.2 MG/DL (ref 8.5–10.5)
CHLORIDE BLD-SCNC: 98 MEQ/L (ref 98–111)
CO2: 26 MEQ/L (ref 23–33)
CREAT SERPL-MCNC: 0.7 MG/DL (ref 0.4–1.2)
EOSINOPHIL # BLD: 1.2 %
EOSINOPHILS ABSOLUTE: 0.2 THOU/MM3 (ref 0–0.4)
ERYTHROCYTE [DISTWIDTH] IN BLOOD BY AUTOMATED COUNT: 12.4 % (ref 11.5–14.5)
ERYTHROCYTE [DISTWIDTH] IN BLOOD BY AUTOMATED COUNT: 38 FL (ref 35–45)
GFR SERPL CREATININE-BSD FRML MDRD: 83 ML/MIN/1.73M2
GLUCOSE BLD-MCNC: 125 MG/DL (ref 70–108)
GLUCOSE BLD-MCNC: 160 MG/DL (ref 70–108)
GLUCOSE BLD-MCNC: 206 MG/DL (ref 70–108)
GLUCOSE BLD-MCNC: 227 MG/DL (ref 70–108)
GLUCOSE BLD-MCNC: 255 MG/DL (ref 70–108)
HCT VFR BLD CALC: 38.7 % (ref 37–47)
HEMOGLOBIN: 13.5 GM/DL (ref 12–16)
IMMATURE GRANS (ABS): 0.28 THOU/MM3 (ref 0–0.07)
IMMATURE GRANULOCYTES: 2.2 %
LYMPHOCYTES # BLD: 20.5 %
LYMPHOCYTES ABSOLUTE: 2.6 THOU/MM3 (ref 1–4.8)
MCH RBC QN AUTO: 30.1 PG (ref 26–33)
MCHC RBC AUTO-ENTMCNC: 34.9 GM/DL (ref 32.2–35.5)
MCV RBC AUTO: 86.2 FL (ref 81–99)
MONOCYTES # BLD: 6.2 %
MONOCYTES ABSOLUTE: 0.8 THOU/MM3 (ref 0.4–1.3)
NUCLEATED RED BLOOD CELLS: 1 /100 WBC
PLATELET # BLD: 125 THOU/MM3 (ref 130–400)
PMV BLD AUTO: 11.6 FL (ref 9.4–12.4)
POTASSIUM REFLEX MAGNESIUM: 4 MEQ/L (ref 3.5–5.2)
RBC # BLD: 4.49 MILL/MM3 (ref 4.2–5.4)
SEG NEUTROPHILS: 69.7 %
SEGMENTED NEUTROPHILS ABSOLUTE COUNT: 9 THOU/MM3 (ref 1.8–7.7)
SODIUM BLD-SCNC: 133 MEQ/L (ref 135–145)
WBC # BLD: 12.9 THOU/MM3 (ref 4.8–10.8)

## 2022-01-07 PROCEDURE — 99232 SBSQ HOSP IP/OBS MODERATE 35: CPT | Performed by: PHYSICAL MEDICINE & REHABILITATION

## 2022-01-07 PROCEDURE — 6370000000 HC RX 637 (ALT 250 FOR IP): Performed by: NURSE PRACTITIONER

## 2022-01-07 PROCEDURE — 85025 COMPLETE CBC W/AUTO DIFF WBC: CPT

## 2022-01-07 PROCEDURE — 82948 REAGENT STRIP/BLOOD GLUCOSE: CPT

## 2022-01-07 PROCEDURE — 6360000002 HC RX W HCPCS: Performed by: PHYSICIAN ASSISTANT

## 2022-01-07 PROCEDURE — 97530 THERAPEUTIC ACTIVITIES: CPT

## 2022-01-07 PROCEDURE — 80048 BASIC METABOLIC PNL TOTAL CA: CPT

## 2022-01-07 PROCEDURE — 1200000000 HC SEMI PRIVATE

## 2022-01-07 PROCEDURE — 99232 SBSQ HOSP IP/OBS MODERATE 35: CPT | Performed by: PHYSICIAN ASSISTANT

## 2022-01-07 PROCEDURE — 2580000003 HC RX 258: Performed by: PHYSICIAN ASSISTANT

## 2022-01-07 PROCEDURE — 36415 COLL VENOUS BLD VENIPUNCTURE: CPT

## 2022-01-07 PROCEDURE — 97535 SELF CARE MNGMENT TRAINING: CPT

## 2022-01-07 RX ORDER — SODIUM CHLORIDE 9 MG/ML
INJECTION, SOLUTION INTRAVENOUS CONTINUOUS
Status: DISCONTINUED | OUTPATIENT
Start: 2022-01-07 | End: 2022-01-08

## 2022-01-07 RX ADMIN — FAMOTIDINE 20 MG: 20 TABLET ORAL at 09:43

## 2022-01-07 RX ADMIN — ENOXAPARIN SODIUM 30 MG: 100 INJECTION SUBCUTANEOUS at 12:51

## 2022-01-07 RX ADMIN — ROSUVASTATIN 10 MG: 10 TABLET, FILM COATED ORAL at 21:38

## 2022-01-07 RX ADMIN — INSULIN LISPRO 2 UNITS: 100 INJECTION, SOLUTION INTRAVENOUS; SUBCUTANEOUS at 21:35

## 2022-01-07 RX ADMIN — ACETAMINOPHEN 650 MG: 325 TABLET ORAL at 15:39

## 2022-01-07 RX ADMIN — ASPIRIN 81 MG CHEWABLE TABLET 81 MG: 81 TABLET CHEWABLE at 09:43

## 2022-01-07 RX ADMIN — ENOXAPARIN SODIUM 30 MG: 100 INJECTION SUBCUTANEOUS at 00:41

## 2022-01-07 RX ADMIN — SODIUM CHLORIDE: 9 INJECTION, SOLUTION INTRAVENOUS at 16:42

## 2022-01-07 RX ADMIN — FAMOTIDINE 20 MG: 20 TABLET ORAL at 21:53

## 2022-01-07 RX ADMIN — INSULIN GLARGINE 20 UNITS: 100 INJECTION, SOLUTION SUBCUTANEOUS at 10:35

## 2022-01-07 ASSESSMENT — PAIN SCALES - GENERAL
PAINLEVEL_OUTOF10: 9
PAINLEVEL_OUTOF10: 2

## 2022-01-07 NOTE — PROGRESS NOTES
Spoke with patient explained Rehab philosophy and need for patient to convalesce more in order to considered for acute inpatient rehab. Will re-evaluate patient on Monday.

## 2022-01-07 NOTE — PROGRESS NOTES
Physical Medicine & Rehabilitation  Progress Note                Alexandra Duque MD      Chief Complaint:  COVID-19 infection; Rehab needs    Subjective:  Patient seen in her room on 8E. Granddaughter present and stated patient has been sleeping for hours. Her nurse Connie Tobias verified the same. Patient did open her right eye for a few seconds but then closed it again. Went through both PT and OT today with fair tolerance; suspect she is just worn out! Will see her again next week. Rehabilitation:  PT: Reviewed:    OBJECTIVE:  BED MOBILITY:  Supine to Sit: Maximum Assistance, X 2, with head of bed raised, with rail, with verbal cues , with increased time for completion  extra time and cues needed for pt to assist  Sit to [place] L, X 2, with rail, with verbal cues , with increased time for completion    Scooting: Maximum Assistance ; advancing hips forward to EOB, dependent to boost to [facility] using hercules sheet     BALANCE:  Sitting Balance:  Minimal Assistance-Maximum Assistance while seated at EOB for ~5 minutes. Posterior lean noted with cues to correct. Pt able to hold onto/ bedrail/bed for support. Pt needing cues for abd engagement and hand placement throughout. Pt did c/o lightheadedness at this time. Checked vitals while pt seated.      Functional Outcome Measures: Completed  AM-PAC Inpatient Mobility Raw Score : 6  AM-PAC Inpatient T-Scale Score : 23.55     ASSESSMENT:  Assessment: Patient progressing toward established goals. Activity Tolerance:  Patient tolerance of  treatment: fair. Pt limited by fatigue and lightheadedness. OT: Reviewed:     COGNITION: Slow Processing, Decreased Recall, Decreased Insight, Impaired Memory, Decreased Problem Solving, Impaired Attention, Decreased Arousal and Difficulty Following Commands     ADL:   Feeding: Maximum Assistance.   for scooping/stabbing food or grasping cup and bringing to mouth; max assist required for support/achieving ROM needed to bring to mouth; decreased  strength on utensils with built up foam provided to increase ease with gripping. Grooming: Dependent. Combing hair while seated at EOB; significant UB weakness limited ability to /reach head to complete. Increased time to complete ADL tasks. Pt only eating <25% of meal before reporting was too fatigued to eat anything further.     BALANCE:  Sitting Balance:  Minimal Assistance-Maximum Assistance while seated at EOB for ~5 minutes. Posterior lean noted with cues to correct. Pt able to hold onto/ bedrail/bed for support     BED MOBILITY:  Supine to Sit: Maximum Assistance, X 2, with head of bed raised, with rail, with verbal cues , with increased time for completion    Sit to Supine: Maximum Assistance, X 2, with rail, with verbal cues , with increased time for completion    Scooting: Maximum Assistance ; advancing hips forward to EOB  Pt demoed mod-max difficulty maintaining hold on bedrail when completing bed mobility.        ASSESSMENT:  Activity Tolerance:  Patient tolerance of  treatment: fair. ST: Reviewed:  From 1/4/22:    Diagnosis: Hypoxia  Secondary Diagnosis: Dysphagia, COVID-19  Precautions: Aspiration Risk   History of Present Illness/Injury: Patient admitted with above diagnosis. Per chart review, \"Kaleigh Sierra is a 61-year-old white female who presented to [facility] on 12/18/2021 with complaints of shortness of breath she has a past medical history lifetime non-smoker, CAD with stent placement in 2008, hypertension, diabetes mellitus, dyslipidemia.  Per report she presented to [facility] on 12/18 with complaints of hypoxia and shortness of breath.  She was initially placed on 4 L nasal cannula.  12/19 patient had persistent hypoxia and was transitioned to high flow nasal cannula.  On 12/20 patient was transitioned to BiPAP and transferred to [facility] ICU for intubation.  She was intubated on 12/20/2021 and extubated on 1/2/2022. \" ST recommended completion of MBS to further assess pharyngeal swallow function d/t heightened risk of dysphagia and silent aspiration s/t prolonged intubation and aphonic voicing.      has no past medical history on file.      Current Diet:  NPO with exceptions for ice chips and sips of water pending MBS      Pain: No pain reported.     SUBJECTIVE:  Patient arrived to fluoro suite via bed, awake and alert with intermittent confusion presents.  Patient with adequate alertness and engagement throughout study.      OBJECTIVE:     Respiratory Status:  Nasal Canula (3L)     Behavioral Observation:  Alert, Oriented and Intermittent Confusion     PATIENT WAS EVALUATED USING:  BARIUM: thin liquids via tsp/cup/straw, puree, soft texture, mixed consistency, and hard/coarse texture      ORAL PREPARATION PHASE:  Impaired:  Slow Mastication, Uncoordinated Mastication, Decreased Bolus Control and Decreased Bolus Formation     ORAL PHASE: Uncoordinated AP Movement, Slow AP Movement and Oral Residue            ORAL PHASE ALISON SCORE: (Dysphagia outcome and severity scale)  5 = Mild Dysphagia - May have one diet consistency restricted - Mild oral residue but clears     PHARYNGEAL PHASE:  Impaired: Delayed Swallow, Decreased Tongue Based Retraction, Residue in the Valleculae, Residue in the Pyriform Sinus, Residue Along the Posterior Pharyngeal Wall and Decreased Pharyngeal Constriction       PHARYNGEAL PHASE ALISON SCORE: (Dysphagia outcome and severity scale)  4 = Mild-Moderate Dysphagia - One or two consistencies restricted - may exhibit one or more of the following: Residue clears with cue, Aspiration of one consistency with weak or no reflexive cough, Laryngeal penetration to the vocal cords with cough with two consistencies, Laryngeal penetration to the vocal cords without cough on one consistency     EVIDENCE FOR LARYNGEAL PENETRATION AND/OR ASPIRATION:  No evidence of aspiration  Laryngeal penetration evident with thin liquids      PENETRATION-ASPIRATION SCALE (PAS): Thin Liquids: 2 = Material enters the airway, remains above vocal folds, and is ejected from the airway  Puree:  1 = Material does not enter the airway  Soft Solid:  1 = Material does not enter the airway  Hard Solid: 1 = Material does not enter the airway     ESOPHAGEAL PHASE:          No significant findings     ATTEMPTED TECHNIQUES:  Small Bolus Size Effective     Straw Effective     Cup Effective     Chin Tuck Not Attempted     Head Turn Not Attempted     Spoon Presentations Effective     Volitional Cough Not Attempted     Spontaneous Cough Not Attempted                DIAGNOSTIC IMPRESSIONS:  Patient presents with mild oral dysphagia and mild-moderate pharyngeal dysphagia based on skilled clinical findings outlined above. Patient demonstrated evidence of slow/uncoordinated mastication with effective textural breakdown when given additional time, decreased bolus formation, and slow/uncoordinated ap transit resulting in mild oral residue that cleared with additional swallow. Patient with decreased bolus control resulting in premature spillage into the pharynx to the level of the vallecula and pyriforms which was further compounded by delayed swallow initiation. Patient with adequate hyolaryngeal elevation/excursion resulting in complete epiglottic inversion and adequate thyrohyoid approximation; however, patient with intermittent trace, transient penetration occurring DURING the swallow with thin liquids. No aspiration observed this date. Patient with decreased base of tongue retraction and pharyngeal constriction resulting in mild pharyngeal residue remaining in the vallecula, along the posterior pharyngeal wall, and within the pyriforms. Pharyngeal residue cleared with implementation of liquid wash OR double swallow. Of note, patient with bony protrusion at C5; however, protrusion did not appear to impact bolus flow.  At this time, recommend patient initiate a soft and bite-size diet with thin liquids with direct 1:1 assistance d/t reduced UE ROM/Strength and implementation of upright position, small bites/sips, alternation of bites/sips, and double swallow.     Dysphagia tx: Following completion of MBS, ST completed review of swallow anatomy and physiology via use of computer monitor. ST defined aspiration and penetration and explained risk factors associated with aspiration (pneumonia, pulmonary decline, respiratory failure, extended/frequent hospitalizations, etc). ST then reviewed results from [address] ST discussed diet recommendations as well as recommended swallow strategies to improve safety and efficiency. Patient stated understanding and was in agreement with POC moving forward.    from the assessment; verbal receptiveness noted      Diet Recommendations:  Soft and Bite-Size with Thin Liquids   Strategies:  Full Upright Position, Small Bite/Sip, Multiple Swallow, Pulmonary Monitoring, Medications Whole with Puree, Direct 1:1 Supervision, Alternate Solids and Liquids, Limit Distractions and Monitor for Fatigue                  Review of Systems - unable to complete secondary to lethargy    Objective:  BP (!) 96/53   Pulse 69   Temp 97.5 °F (36.4 °C) (Oral)   Resp 20   Ht 5' 2\" (1.575 m)   Wt 180 lb 1.6 oz (81.7 kg)   SpO2 90%   BMI 32.94 kg/m²   lethargic  Orientation:   Patient lethargic  Mood: Lethargic  Affect: Lethargic  General appearance: overweight    Memory:   Lethargic  Attention/Concentration: abnormal - lethargic  Language:  abnormal - lethargic    Cranial Nerves:  Unable to test secondary to lethargy  ROM:  WNL  Motor Exam:  Unable to test secondary to lethargy  Tone:  normal  Muscle bulk: within normal limits  Sensory:  Unable to test secondary to lethargy  Coordination:    Unable to test   Gait: Not tested    Heart: normal rate and regular rhythm  Lungs: expiratory wheezes  Abdomen: soft, non-tender, non-distended, normal bowel sounds, no masses or organomegaly    Skin: warm and dry  Peripheral vascular: Pulses: Normal upper and lower extremity pulses; Edema: no    Diagnostics:   Recent Results (from the past 24 hour(s))   POCT glucose    Collection Time: 01/06/22  6:03 PM   Result Value Ref Range    POC Glucose 211 (H) 70 - 108 mg/dl   POCT glucose    Collection Time: 01/06/22  8:16 PM   Result Value Ref Range    POC Glucose 279 (H) 70 - 108 mg/dl   POCT glucose    Collection Time: 01/07/22  7:35 AM   Result Value Ref Range    POC Glucose 125 (H) 70 - 108 mg/dl   POCT glucose    Collection Time: 01/07/22 11:19 AM   Result Value Ref Range    POC Glucose 160 (H) 70 - 108 mg/dl       Impression:  · COVID-19 Infection  · Mild oral dysphagia and  · Mild-moderate pharyngeal dysphagia     Plan:  · Will re-evaluate next week     I have reviewed the individualized overall plan of care developed by each specialty and agree with the treatment approach at this time. Please reference the history and physical along with interdisciplinary team conference notes from this admission for full details concerning the estimated LOS, intensity of services, and discussion of medical comorbid conditions.        Orien Dakin, MD

## 2022-01-07 NOTE — PROGRESS NOTES
[facility]  Occupational Therapy  Daily Note  Time:   Time In: 08  Time Out: 09  Timed Code Treatment Minutes: 33 Minutes  Minutes: 33      Co-tx completed with PT due to medically complex, requiring assist X2 to safely complete and would not tolerate 2 separate therapy sessions. Date: 2022  Patient Name: Merlin Barrs,   Gender: female      Room: [facility]  MRN: [id number]  : 1950  (73 y.o.)  Referring Practitioner: WYATT Quick CNP  Diagnosis: hypoxia  Additional Pertinent Hx: Stephanie Lambert is a 24-year-old white female who presented to [facility] on 2021 with complaints of shortness of breath she has a past medical history lifetime non-smoker, CAD with stent placement in , hypertension, diabetes mellitus, dyslipidemia. Per report she presented to [facility] on  with complaints of hypoxia and shortness of breath. She was initially placed on 4 L nasal cannula.  patient had persistent hypoxia and was transitioned to high flow nasal cannula. On  patient was transitioned to BiPAP and transferred to [facility] ICU for intubation. Pt extubated . Restrictions/Precautions:  Restrictions/Precautions: General Precautions,Fall Risk  Position Activity Restriction  Other position/activity restrictions: out of COVID isolation 22, monitor BP      SUBJECTIVE: Pt supine in bed upon arrival, agreeable to OT/PT session. Pt lethargic/drowsy throughout, quickly fatigues. Pt softspoken, difficult to understand at times throughout session. PAIN: c/o generalized pain, did not quantify. Vitals: Blood Pressure: at EOB 87/58; returning to supine: 89/58; pt c/o dizziness upon sitting at EOB, slightly improved although did not resolve.  RN was informed, reporting added BP med yesterday to assist with elevating  Oxygen: >90% throughout while on RA    COGNITION: Slow Processing, Decreased Recall, Decreased Insight, Impaired Memory, Decreased Problem Solving, Impaired Attention, Decreased Arousal and Difficulty Following Commands    ADL:   Feeding: Maximum Assistance. for scooping/stabbing food or grasping cup and bringing to mouth; max assist required for support/achieving ROM needed to bring to mouth; decreased  strength on utensils with built up foam provided to increase ease with gripping. Grooming: Dependent. Combing hair while seated at EOB; significant UB weakness limited ability to /reach head to complete. Increased time to complete ADL tasks. Pt only eating <25% of meal before reporting was too fatigued to eat anything further. BALANCE:  Sitting Balance:  Minimal Assistance-Maximum Assistance while seated at EOB for ~5 minutes. Posterior lean noted with cues to correct. Pt able to hold onto/ bedrail/bed for support    BED MOBILITY:  Supine to Sit: Maximum Assistance, X 2, with head of bed raised, with rail, with verbal cues , with increased time for completion    Sit to Supine: Maximum Assistance, X 2, with rail, with verbal cues , with increased time for completion    Scooting: Maximum Assistance ; advancing hips forward to EOB  Pt demoed mod-max difficulty maintaining hold on bedrail when completing bed mobility. ASSESSMENT:     Activity Tolerance:  Patient tolerance of  treatment: fair.         Discharge Recommendations: Inpatient Rehabilitation (when able to tolerate)  Equipment Recommendations: Equipment Needed:  (defer to next level of care)  Plan: Times per week: 5x  Times per day: Daily  Current Treatment Recommendations: Strengthening,Balance Training,Functional Mobility Training,Endurance Training,Home Management Training,Patient/Caregiver Education & Training,Self-Care / ADL,Safety Education & Training    Patient Education  Patient Education: Plan of Care, ADL's and sitting balance    Goals  Short term goals  Time Frame for Short term goals: by discharge  Short term goal 1: Pt will tolerate sitting on EOB >10 min with no > than Min A x1 and min VC while maintaining O2 >90% for increased indep with ADLs  Short term goal 2: Pt will complete EOB ADLs with Mod A for AAROM and min VC for increased indep with grooming  Short term goal 3: Pt will complete BUE AROM/AAROM with no > than Min A and min VC to increase UB strength and endurance to increase indep with ADLs  Short term goal 4: Pt will tolerate OTR to assess t/fs and mobility when appropriate    Following session, patient left in safe position with all fall risk precautions in place.

## 2022-01-07 NOTE — PLAN OF CARE
Problem: Airway Clearance - Ineffective  Goal: Achieve or maintain patent airway  Outcome: Ongoing     Problem: Gas Exchange - Impaired  Goal: Absence of hypoxia  Outcome: Ongoing  Goal: Promote optimal lung function  Outcome: Ongoing     Problem: Breathing Pattern - Ineffective  Goal: Ability to achieve and maintain a regular respiratory rate  Outcome: Ongoing     Problem:  Body Temperature -  Risk of, Imbalanced  Goal: Ability to maintain a body temperature within defined limits  Outcome: Ongoing  Goal: Will regain or maintain usual level of consciousness  Outcome: Ongoing  Goal: Complications related to the disease process, condition or treatment will be avoided or minimized  Outcome: Ongoing     Problem: Isolation Precautions - Risk of Spread of Infection  Goal: Prevent transmission of infection  Outcome: Ongoing     Problem: Nutrition Deficits  Goal: Optimize nutritional status  Outcome: Ongoing     Problem: Risk for Fluid Volume Deficit  Goal: Maintain normal heart rhythm  Outcome: Ongoing  Goal: Maintain absence of muscle cramping  Outcome: Ongoing  Goal: Maintain normal serum potassium, sodium, calcium, phosphorus, and pH  Outcome: Ongoing     Problem: Loneliness or Risk for Loneliness  Goal: Demonstrate positive use of time alone when socialization is not possible  Outcome: Ongoing     Problem: Fatigue  Goal: Verbalize increase energy and improved vitality  Outcome: Ongoing     Problem: Patient Education: Go to Patient Education Activity  Goal: Patient/Family Education  Outcome: Ongoing     Problem: Infection - Central Venous Catheter-Associated Bloodstream Infection:  Goal: Will show no infection signs and symptoms  Description: Will show no infection signs and symptoms  Outcome: Ongoing     Problem: Skin Integrity:  Goal: Will show no infection signs and symptoms  Description: Will show no infection signs and symptoms  Outcome: Ongoing  Goal: Absence of new skin breakdown  Description: Absence of new skin breakdown  Outcome: Ongoing     Problem: Falls - Risk of:  Goal: Will remain free from falls  Description: Will remain free from falls  Outcome: Ongoing  Goal: Absence of physical injury  Description: Absence of physical injury  Outcome: Ongoing     Problem: Non-Violent Restraints  Goal: Removal from restraints as soon as assessed to be safe  Outcome: Ongoing  Goal: No harm/injury to patient while restraints in use  Outcome: Ongoing  Goal: Patient's dignity will be maintained  Outcome: Ongoing     Problem: Pain:  Goal: Pain level will decrease  Description: Pain level will decrease  Outcome: Ongoing  Goal: Control of acute pain  Description: Control of acute pain  Outcome: Ongoing  Goal: Control of chronic pain  Description: Control of chronic pain  Outcome: Ongoing     Problem: DISCHARGE BARRIERS  Goal: Patient's continuum of care needs are met  Outcome: Ongoing

## 2022-01-07 NOTE — PROGRESS NOTES
Hospitalist Progress Note      Patient:  Gemini Grantster    Unit/Bed:8E-71/8E-71A  YOB: 1950  MRN: [id number]   Acct: [de-identified]   PCP: Harvey Orozco  Date of Admission: 12/18/2021    Assessment/Plan:    1. COVID 19: Patient is now out of isolation (1/6). Pt has completed Baricitnib & had toculizamb on 1/2 due to continued fevers. Pt had high dose Decadron. 2. Acute respiratory failure, hypoxia: Patient's baseline is room air. Patient is currently requiring 2 L of oxygen. Pt was intubated on 12/20 and extubated on 1/2. Incentive spirometry. Acapella. CXR (1/1) shows stable bilateral opacities. 3. ARDS--patient was proned  4. Bacterial pneumonia with Klebsiella oxytocin--Rocephin 12/24-1/4  5. Diabetes mellitus type 2, uncontrolled--since patient is eating well we will continue Lantus 20 units daily, change sliding scale to medium dose before meals and at bedtime  6. Mild oral dysphagia/mild to moderate pharyngeal dysphagia--appreciate speech therapy input, diet is soft and bite-size with thin liquid  7. Physical deconditioning/debility--likely secondary to #1, #3 and #4; PT/OT, IPR is consulted and will see today. 8. Obesity with BMI 33.45    Dispo; Awaiting safe DC plan     Chief Complaint: [id number]    Initial H and P:-     Per H&P dated 12/18: Rayray Sierra is a 73 y.o. female with PMHx of CAD, essential hypertension, type II diabetes, who presents to [address] [place] [address] with shortness of breath. Patient states she began having symptoms on 12/12 of loss of appetite, changes in taste and smell, food aversions, nausea with dry heaves and diarrhea.   she originally attributed these symptoms to changes in her diabetic medications. As symptoms persisted, patient states she was advised by her PCP to go to ED at [facility] for workup and found to be COVID positive with hypoxia 70% spo2.    She decided to leave AMA per their recommendation she be admitted to their facility as she preferred to be admitted to [address] [place] [address] for treatment and family close by. She states she has felt okay in spite of measured hypoxia. She denies fevers, chills, chest pain, dizziness, numbness or tingling, abdominal pain      ED course:   Vitals on arrival 98.5F, RR 28, HR 95, /77  Labs: Lactic acid, CBC, ABG, Ferritin, CRP   EKG, CXR, CTA chest\"     12/19--> now on high flow however settings not documented as patient desatted to 83% on 6 L; stop IV fluids     12/20-->on BiPAP now despite being maxed out on high flow and was tachypneic; palliative care saw patient and she does want to be a full code and is okay with intubation     1/4->-patient was intubated on 12/20 and was extubated on 1/2; she was transferred out to stepdown on 1/3; she is hemodynamically stable, she is currently on 3 L of oxygen; speech therapy is planning a modified barium swallow today; patient is fully alert and oriented, in no distress, she is extremely weak and has a very weak voice     1/5--> hemodynamically stable, on 2 L of oxygen satting 94%; patient is alert and oriented however needs frequent redirection with her phone, she is asking for her coat \"to get out of here\"    1/6 --> No acute events overnight. Pt states that her breathing is better today. Pt states that she worked with therapy and did well. She still states that she is extremely weak. Subjective (past 24 hours):   No acute events overnight. Pt was transferred out of COVID isolation. No issues or concerns. Awaiting IPR consult. Past medical history, family history, social history and allergies reviewed again and is unchanged since admission. ROS (All review of systems completed. Pertinent positives noted.  Otherwise All other systems reviewed and negative.)     Medications:  Reviewed    Infusion Medications    sodium chloride 25 mL (01/03/22 7298)    dextrose       Scheduled Medications    insulin glargine  20 Units SubCUTAneous QAM AC    insulin lispro  0-12 Units SubCUTAneous TID WC    insulin lispro  0-6 Units SubCUTAneous Nightly    famotidine  20 mg Oral BID    furosemide  40 mg Oral Daily    rosuvastatin  10 mg Oral Daily    aspirin  81 mg Per NG tube Daily    enoxaparin  30 mg SubCUTAneous Q12H     PRN Meds: acetaminophen **OR** acetaminophen, bisacodyl, dextrose, sodium chloride flush, sodium chloride, ondansetron **OR** ondansetron, senna, polyethylene glycol, glucose, dextrose, glucagon (rDNA), dextrose, albuterol sulfate HFA      Intake/Output Summary (Last 24 hours) at 1/7/2022 1327  Last data filed at 1/7/2022 0616  Gross per 24 hour   Intake 710 ml   Output 1100 ml   Net -390 ml       Diet:  ADULT DIET; Dysphagia - Soft and Bite Sized    Exam:  BP (!) 96/53   Pulse 69   Temp 97.5 °F (36.4 °C) (Oral)   Resp 20   Ht 5' 2\" (1.575 m)   Wt 180 lb 1.6 oz (81.7 kg)   SpO2 90%   BMI 32.94 kg/m²   General appearance: No apparent distress, appears stated age and cooperative. HEENT: Pupils equal, round, and reactive to light. Conjunctivae/corneas clear. Neck: Supple, with full range of motion. No jugular venous distention. Trachea midline. Respiratory:  Normal respiratory effort on NC. Breath sounds diminished. Cardiovascular: Regular rate and rhythm with normal S1/S2 without murmurs, rubs or gallops. Abdomen: Soft, non-tender, non-distended with normal bowel sounds. Musculoskeletal: passive and active ROM x 4 extremities. Skin: Skin color, texture, turgor normal.  No rashes or lesions. Neurologic:  Neurovascularly intact without any focal sensory/motor deficits.  Cranial nerves: II-XII intact, grossly non-focal.  Psychiatric: Alert and oriented, thought content appropriate, normal insight  Capillary Refill: Brisk,< 3 seconds   Peripheral Pulses: +2 palpable, equal bilaterally     Labs:   Recent Labs     01/05/22  0600   WBC 12.4*   HGB 13.8   HCT 39.2        No results for input(s): NA, K, CL, CO2, BUN, CREATININE, CALCIUM, PHOS in the last 72 hours. Invalid input(s): MAGNES  No results for input(s): AST, ALT, BILIDIR, BILITOT, ALKPHOS in the last 72 hours. No results for input(s): INR in the last 72 hours. No results for input(s): Evonnie Montane in the last 72 hours. Microbiology:    Blood culture #1:   Lab Results   Component Value Date    BC No growth-preliminary  01/02/2022    BC  01/02/2022     possible contamination; clinical correlation required     BC No growth-preliminary  01/02/2022     Urine culture:   Lab Results   Component Value Date    LABURIN No growth-preliminary No growth  12/20/2021     Urinalysis:      Lab Results   Component Value Date    NITRU NEGATIVE 12/31/2021    WBCUA 0-2 12/31/2021    BACTERIA NONE SEEN 12/31/2021    RBCUA 10-15 12/31/2021    BLOODU LARGE 12/31/2021    SPECGRAV 1.026 12/31/2021       Radiology:  FL MODIFIED BARIUM SWALLOW W VIDEO   Final Result   Laryngeal penetration without aspiration with thin liquids. Recommendations and comments available from speech therapy            **This report has been created using voice recognition software. It may contain minor errors which are inherent in voice recognition technology. **      Final report electronically signed by Dr. Yon Carrillo on 1/4/2022 11:34 AM      XR CHEST PORTABLE   Final Result   Stable mild bilateral patchy pulmonary opacities. **This report has been created using voice recognition software. It may contain minor errors which are inherent in voice recognition technology. **      Final report electronically signed by Dr. Germán Pollack on 1/1/2022 7:25 AM      VL DUP LOWER EXTREMITY ARTERIES LEFT   Final Result   No significant stenosis or occlusion. **This report has been created using voice recognition software. It may contain minor errors which are inherent in voice recognition technology. **      Final report electronically signed by Dr. Yon Carrillo on 12/30/2021 3:17 PM      XR CHEST PORTABLE   Final Result   No interval change. This document has been electronically signed by: Chantel Alex DO on    12/28/2021 02:39 AM      XR CHEST PORTABLE   Final Result   Impression:   Bilateral consolidations. Improving left-sided infiltrates. This document has been electronically signed by: John Schneider MD on    12/25/2021 05:09 AM      XR CHEST PORTABLE   Final Result   1. Support lines and tubes as outlined above, ET tube is 4.4 cm superior    to the telma. 2. Diffuse scattered infiltrates and airspace disease throughout the    bilateral lungs. More complex consolidation involving the right lung base. This document has been electronically signed by: Chantel Alex DO on    12/24/2021 03:04 AM      XR CHEST PORTABLE   Final Result   Interval placement of right-sided PICC line with catheter tip at the cavoatrial junction with stable diffuse patchy lung opacities bilaterally. **This report has been created using voice recognition software. It may contain minor errors which are inherent in voice recognition technology. **      Final report electronically signed by Dr. Km Waddell MD on 12/21/2021 7:10 PM      XR CHEST PORTABLE   Final Result   Diffuse patchy lung opacities as evidence for diffuse pneumonia or ARDS following intubation. **This report has been created using voice recognition software. It may contain minor errors which are inherent in voice recognition technology. **      Final report electronically signed by Dr. Km Waddell MD on 12/20/2021 7:21 PM      XR CHEST PORTABLE   Final Result   1. Marked worsening of the diffuse airspace opacities relating to multilobar pneumonia. **This report has been created using voice recognition software. It may contain minor errors which are inherent in voice recognition technology. **      Final report electronically signed by Dr Willy Yip on

## 2022-01-07 NOTE — PROGRESS NOTES
[facility]  INPATIENT PHYSICAL THERAPY  DAILY NOTE  STRZ SURGE OVERFLOW - 8E-71/8E-71A    Time In: 8704  Time Out: 0901  Timed Code Treatment Minutes: 23 Minutes  Minutes: 23      co-tx with OT due to medical complexity and decreased activity tolerance. Pt requires +2 skilled assist.    Date: 2022  Patient Name: Ángel Hightower,  Gender:  female        MRN: [id number]  : 1950  (73 y.o.)     Referring Practitioner: Darby Myrick CNP  Diagnosis: Hypoxia  Additional Pertinent Hx: Srikanth Post is a 80-year-old white female who presented to [facility] on 2021 with complaints of shortness of breath she has a past medical history lifetime non-smoker, CAD with stent placement in , hypertension, diabetes mellitus, dyslipidemia. Per report she presented to [facility] on  with complaints of hypoxia and shortness of breath. She was initially placed on 4 L nasal cannula.  patient had persistent hypoxia and was transitioned to high flow nasal cannula. On  patient was transitioned to BiPAP and transferred to [facility] ICU for intubation. Pt extubated . Prior Level of Function:  Lives With: Spouse  Type of Home: House  Home Layout: One level  Home Equipment:  (None)        ADL Assistance: Independent  Homemaking Assistance: Independent  Ambulation Assistance: Independent  Transfer Assistance: Independent  Active : Yes  Additional Comments: Pt fully I prior to admission, 6 kids    Restrictions/Precautions:  Restrictions/Precautions: General Precautions,Fall Risk  Position Activity Restriction  Other position/activity restrictions: out of COVID isolation 22, monitor BP     SUBJECTIVE: RN approved session stating pt has been having low BPs. Pt in bed upon arrival and agrees to therapy.  Pt cooperative for session    PAIN: \"My belly feels soft\" and generally sore    Vitals: Blood Pressure: at EOB 87/58; returning to supine: 89/58; pt c/o dizziness upon sitting at EOB, slightly improved although did not resolve. RN was informed, reporting added BP med yesterday to assist with elevating Pt did report that symptoms improved once back in supine. OBJECTIVE:  BED MOBILITY:  Supine to Sit: Maximum Assistance, X 2, with head of bed raised, with rail, with verbal cues , with increased time for completion  extra time and cues needed for pt to assist  Sit to Supine: Maximum Assistance, X 2, with rail, with verbal cues , with increased time for completion    Scooting: Maximum Assistance ; advancing hips forward to EOB, dependent to boost to [facility] using hercules sheet     BALANCE:  Sitting Balance:  Minimal Assistance-Maximum Assistance while seated at EOB for ~5 minutes. Posterior lean noted with cues to correct. Pt able to hold onto/ bedrail/bed for support. Pt needing cues for abd engagement and hand placement throughout. Pt did c/o lightheadedness at this time. Checked vitals while pt seated.      Functional Outcome Measures: Completed  AM-PAC Inpatient Mobility Raw Score : 6  AM-PAC Inpatient T-Scale Score : 23.55    ASSESSMENT:  Assessment: Patient progressing toward established goals. Activity Tolerance:  Patient tolerance of  treatment: fair. Pt limited by fatigue and lightheadedness. Pt will reuqire cont PT at this time to improve overall function and independence. Equipment Recommendations: Other: will monitor for needs pending progress and DC destination  Discharge Recommendations: Inpatient Therapy Stay  Plan: Times per week: 5x GM  Current Treatment Recommendations: Strengthening,Positioning,Patient/Caregiver Education & Training,Safety Education & Training,Balance Training,Endurance Training,Functional Mobility Training,Neuromuscular Re-education    Patient Education  Patient Education: Plan of Care, Bed Mobility, Verbal Exercise Instruction    Goals:  Patient goals : to get stronger  Short term goals  Time Frame for Short term goals: by discharge  Short term goal 1: Pt to roll L and R In bed with mod A to improve bed mobility. Short term goal 2: Pt to transfer supine <--> sit min A to enable pt to get in/out of bed. Short term goal 3: Pt to sit EOB with SBA x 10 minutes for improved upright tolerance in prep for transfers/gait. Short term goal 4: PT to assess transfers/gait. Long term goals  Time Frame for Long term goals : NA due to short length of stay. Following session, patient left in safe position with all fall risk precautions in place.

## 2022-01-08 LAB
BLOOD CULTURE, ROUTINE: NORMAL
GLUCOSE BLD-MCNC: 133 MG/DL (ref 70–108)
GLUCOSE BLD-MCNC: 175 MG/DL (ref 70–108)
GLUCOSE BLD-MCNC: 200 MG/DL (ref 70–108)
GLUCOSE BLD-MCNC: 230 MG/DL (ref 70–108)

## 2022-01-08 PROCEDURE — 2580000003 HC RX 258: Performed by: PHYSICIAN ASSISTANT

## 2022-01-08 PROCEDURE — 99232 SBSQ HOSP IP/OBS MODERATE 35: CPT | Performed by: PHYSICIAN ASSISTANT

## 2022-01-08 PROCEDURE — 1200000000 HC SEMI PRIVATE

## 2022-01-08 PROCEDURE — 6360000002 HC RX W HCPCS: Performed by: PHYSICIAN ASSISTANT

## 2022-01-08 PROCEDURE — 82948 REAGENT STRIP/BLOOD GLUCOSE: CPT

## 2022-01-08 PROCEDURE — 6370000000 HC RX 637 (ALT 250 FOR IP): Performed by: NURSE PRACTITIONER

## 2022-01-08 PROCEDURE — 92526 ORAL FUNCTION THERAPY: CPT

## 2022-01-08 RX ORDER — 0.9 % SODIUM CHLORIDE 0.9 %
500 INTRAVENOUS SOLUTION INTRAVENOUS ONCE
Status: COMPLETED | OUTPATIENT
Start: 2022-01-08 | End: 2022-01-08

## 2022-01-08 RX ORDER — SODIUM CHLORIDE 9 MG/ML
INJECTION, SOLUTION INTRAVENOUS CONTINUOUS
Status: DISCONTINUED | OUTPATIENT
Start: 2022-01-08 | End: 2022-01-08

## 2022-01-08 RX ORDER — SODIUM CHLORIDE 9 MG/ML
INJECTION, SOLUTION INTRAVENOUS CONTINUOUS
Status: ACTIVE | OUTPATIENT
Start: 2022-01-08 | End: 2022-01-09

## 2022-01-08 RX ADMIN — INSULIN LISPRO 2 UNITS: 100 INJECTION, SOLUTION INTRAVENOUS; SUBCUTANEOUS at 22:05

## 2022-01-08 RX ADMIN — INSULIN LISPRO 4 UNITS: 100 INJECTION, SOLUTION INTRAVENOUS; SUBCUTANEOUS at 18:03

## 2022-01-08 RX ADMIN — ROSUVASTATIN 10 MG: 10 TABLET, FILM COATED ORAL at 21:57

## 2022-01-08 RX ADMIN — ASPIRIN 81 MG CHEWABLE TABLET 81 MG: 81 TABLET CHEWABLE at 09:38

## 2022-01-08 RX ADMIN — SODIUM CHLORIDE 500 ML: 9 INJECTION, SOLUTION INTRAVENOUS at 09:21

## 2022-01-08 RX ADMIN — ENOXAPARIN SODIUM 30 MG: 100 INJECTION SUBCUTANEOUS at 00:42

## 2022-01-08 RX ADMIN — FAMOTIDINE 20 MG: 20 TABLET ORAL at 09:38

## 2022-01-08 RX ADMIN — FAMOTIDINE 20 MG: 20 TABLET ORAL at 21:57

## 2022-01-08 RX ADMIN — SODIUM CHLORIDE: 9 INJECTION, SOLUTION INTRAVENOUS at 18:03

## 2022-01-08 RX ADMIN — ENOXAPARIN SODIUM 30 MG: 100 INJECTION SUBCUTANEOUS at 13:26

## 2022-01-08 RX ADMIN — INSULIN GLARGINE 20 UNITS: 100 INJECTION, SOLUTION SUBCUTANEOUS at 11:14

## 2022-01-08 RX ADMIN — INSULIN LISPRO 2 UNITS: 100 INJECTION, SOLUTION INTRAVENOUS; SUBCUTANEOUS at 13:27

## 2022-01-08 ASSESSMENT — PAIN SCALES - GENERAL
PAINLEVEL_OUTOF10: 0
PAINLEVEL_OUTOF10: 0

## 2022-01-08 NOTE — PROGRESS NOTES
[facility]  INPATIENT SPEECH THERAPY  STRZ SURGE OVERFLOW  DAILY NOTE    TIME   SLP Individual Minutes  Time In: 7312  Time Out: 0805  Minutes: 18  Timed Code Treatment Minutes: 0 Minutes       Date: 2022  Patient Name: Jose M Mccullough      CSN: [id number]   Date: 2022  : 1950  (73 y.o.)  Gender: female   Referring Physician:  Nicolette Reeder  Secondary Diagnosis: Dysphagia, COVID-19  Precautions: Aspiration Risk   History of Present Illness/Injury: Patient admitted with above diagnosis. Per chart review, \"Kaleigh Sierra is a 72-year-old white female who presented to [facility] on 2021 with complaints of shortness of breath she has a past medical history lifetime non-smoker, CAD with stent placement in , hypertension, diabetes mellitus, dyslipidemia.  Per report she presented to [facility] on  with complaints of hypoxia and shortness of breath.  She was initially placed on 4 L nasal cannula.   patient had persistent hypoxia and was transitioned to high flow nasal cannula.  On  patient was transitioned to BiPAP and transferred to [facility] ICU for intubation. She was intubated on 2021 and extubated on 2022. \" ST recommended completion of MBS to further assess pharyngeal swallow function d/t heightened risk of dysphagia and silent aspiration s/t prolonged intubation and aphonic voicing.      has no past medical history on file. Current Diet: Soft and bite sized  Swallowing Strategies: Standard Universal Swallow Precautions  Date of Last MBS/FEES: 2022    Pain:  No pain reported. Subjective:  Patient laying in bed sleeping upon ST arrival. Easily roused to call of name. Patient pleasant; albeit it confused and perseverative with ST session. Aphonic speech present throughout tx session.     Short-Term Goals:  SHORT TERM GOAL #1:  Goal 1: Patient wll consume a soft and bite-size diet with thin liquids while implementing recommended swallow strategies with no overt s/s of aspiration and adequate endurance to assist with meeting nutrition/hydration needs. INTERVENTIONS: Prior to completion of soft and bite sized trials, ST completed comprehensive oral hygiene to clear colonization of bacteria. Patient cooperative; however demonstrated mild difficulties in following basic 1-2 step commands throughout task. PO trials of soft and bite sized revealed adequate oral acceptance of spoon with weakened labial seal resulting in mild anterior spillage. Patient with adequate mastication, suspect adequate bolus manipulation/control, timely AP transit and timely swallow initiation. Thin liquids via straw revealed poor intraoral pressure, resulting in prolonged time for material to reach oral cavity. Again, suspect weakened labial seal as evidenced by mild anterior spillage of liquids. Suspect adequate bolus control, timely AP transit and timely swallow initiation. NO overt s/s of penetration/aspiration present at bedside; however, certainly cannot r/o without formal imaging, which is not warranted at this time. ST recommends patient continue on soft and bite sized diet/thin liquids per most recent MBS recommendations. SHORT TERM GOAL #2:  Goal 2: Patient will consume advanced PO trials with ST while demonstrating timely/coordinated mastication, complete bolus clearance, no overt s/s of aspiration, and adeqaute endurance for potential diet advancement to least restrictive diet  INTERVENTIONS:DNT d/t focus on other goals    SHORT TERM GOAL #3:  Goal 3: Patient will complete pharyngeal strengthening exercises x10-15 (effortful swallow, chris) to improve overall timeliness of swallow, airway protection, and decrease pharyngeal residue.   INTERVENTIONS: Effortful Swallow: x7, poor-fair success  *suspect patient with limited understanding of task - education and demonstration provided   *patient perseverative on \"someone cutting hair\" throughout task , requiring multiple verbal redirections. SHORT TERM GOAL #4:  Goal 4: Patient will complete ouhsli-cscticrz-iuzynarsn evaluation to further assess current cognitive-linguistic skills and update POC as clinically indicated. INTERVENTIONS: ST attempted gqkcml-ucrhmyeu-nhdcyrrff evaluation via [facility] at beginning of session; however, attempt d/c d/t patient refusing - ST shifted to dysphagia tx for remainder of session. Will continue to monitor for appropriateness. Long-Term Goals:   No LTG's established at this time d/t short ELOS. EDUCATION:  Learner: Patient  Education:  Reviewed diet and strategies, Reviewed signs, symptoms and risks of aspiration, Reviewed ST goals and Plan of Care and Reviewed recommendations for follow-up  Evaluation of Education: Verbalizes understanding, Needs further instruction, No evidence of learning and Family not present    ASSESSMENT/PLAN:  Activity Tolerance:  Patient tolerance of  treatment: good. Assessment/Plan: Patient progressing toward established goals. Continues to require skilled care of licensed speech pathologist to progress toward achievement of established goals and plan of care. .     Plan for Next Session: speech-language cognitive assessment     Gabriella Gamble, [facility], [address] [place] [address]

## 2022-01-08 NOTE — PLAN OF CARE
Problem: Airway Clearance - Ineffective  Goal: Achieve or maintain patent airway  Outcome: Ongoing  Note: Room air 95%. Problem: Gas Exchange - Impaired  Goal: Absence of hypoxia  Outcome: Ongoing  Note: Check pulse ox as ordered      Problem: Breathing Pattern - Ineffective  Goal: Ability to achieve and maintain a regular respiratory rate  Outcome: Ongoing  Note: Respirations easy and unlabored      Problem: Body Temperature -  Risk of, Imbalanced  Goal: Ability to maintain a body temperature within defined limits  Outcome: Ongoing  Note: No fever. Axillary temp 97.2     Problem: Nutrition Deficits  Goal: Optimize nutritional status  Outcome: Ongoing  Note: Monitor intake and output      Problem: Fatigue  Goal: Verbalize increase energy and improved vitality  Outcome: Ongoing     Problem: Infection - Central Venous Catheter-Associated Bloodstream Infection:  Goal: Will show no infection signs and symptoms  Description: Will show no infection signs and symptoms  Outcome: Ongoing  Note: Turn and reposition, red open area to coccyx area. Epc cream      Problem: Skin Integrity:  Goal: Will show no infection signs and symptoms  Description: Will show no infection signs and symptoms  Outcome: Ongoing  Note: Turn and reposition, red open area to coccyx area. Epc cream      Problem: Skin Integrity:  Goal: Absence of new skin breakdown  Description: Absence of new skin breakdown  Outcome: Ongoing     Problem: Falls - Risk of:  Goal: Will remain free from falls  Description: Will remain free from falls  Outcome: Ongoing  Note: Bed alarm in use. Call light in reach.       Problem: Pain:  Goal: Pain level will decrease  Description: Pain level will decrease  Outcome: Ongoing  Note: No pain at this time     Problem: DISCHARGE BARRIERS  Goal: Patient's continuum of care needs are met  Outcome: Ongoing  Note: Continue to work toward discharge goals

## 2022-01-08 NOTE — PROGRESS NOTES
Hospitalist Progress Note      Patient:  Lani Mandel    Unit/Bed:8E-71/8E-71A  YOB: 1950  MRN: [id number]   Acct: [de-identified]   PCP: Nataly Carrillo  Date of Admission: 12/18/2021    Assessment/Plan:    1. COVID 19: Patient is now out of isolation (1/6). Pt has completed Baricitnib & had toculizamb on 1/2 due to continued fevers. Pt had high dose Decadron. 2. Acute respiratory failure, hypoxia: Patient's baseline is room air. Patient is on RA. Pt was intubated on 12/20 and extubated on 1/2. Incentive spirometry. Acapella. CXR (1/1) shows stable bilateral opacities. 3. ARDS--patient was proned  4. Bacterial pneumonia with Klebsiella oxytocin--Rocephin 12/24-1/4  5. Diabetes mellitus type 2, uncontrolled--since patient is eating well we will continue Lantus 20 units daily, change sliding scale to medium dose before meals and at bedtime  6. Mild oral dysphagia/mild to moderate pharyngeal dysphagia--appreciate speech therapy input, diet is soft and bite-size with thin liquid  7. Physical deconditioning/debility--likely secondary to #1, #3 and #4; PT/OT, IPR is consulted and will see again on Monday. 8. Obesity with BMI 33.45    Dispo; Awaiting safe DC plan     Chief Complaint: [id number]    Initial H and P:-     Per H&P dated 12/18: Lacey Sierra is a 73 y.o. female with PMHx of CAD, essential hypertension, type II diabetes, who presents to [address] [place] [id number] with shortness of breath. Patient states she began having symptoms on 12/12 of loss of appetite, changes in taste and smell, food aversions, nausea with dry heaves and diarrhea.   she originally attributed these symptoms to changes in her diabetic medications. As symptoms persisted, patient states she was advised by her PCP to go to ED at [facility] for workup and found to be COVID positive with hypoxia 70% spo2.    She decided to leave AMA per their recommendation she be admitted to their facility as she preferred to be admitted to [address] [place] [address] for treatment and family close by. She states she has felt okay in spite of measured hypoxia. She denies fevers, chills, chest pain, dizziness, numbness or tingling, abdominal pain      ED course:   Vitals on arrival 98.5F, RR 28, HR 95, /77  Labs: Lactic acid, CBC, ABG, Ferritin, CRP   EKG, CXR, CTA chest\"     12/19--> now on high flow however settings not documented as patient desatted to 83% on 6 L; stop IV fluids     12/20-->on BiPAP now despite being maxed out on high flow and was tachypneic; palliative care saw patient and she does want to be a full code and is okay with intubation     1/4->-patient was intubated on 12/20 and was extubated on 1/2; she was transferred out to stepdown on 1/3; she is hemodynamically stable, she is currently on 3 L of oxygen; speech therapy is planning a modified barium swallow today; patient is fully alert and oriented, in no distress, she is extremely weak and has a very weak voice     1/5--> hemodynamically stable, on 2 L of oxygen satting 94%; patient is alert and oriented however needs frequent redirection with her phone, she is asking for her coat \"to get out of here\"    1/6 --> No acute events overnight. Pt states that her breathing is better today. Pt states that she worked with therapy and did well. She still states that she is extremely weak. 1/7: No acute events overnight. Pt was transferred out of COVID isolation. Awaiting IPR consult. Subjective (past 24 hours):   Pt is more alert and conversational today. Pt is still very weak. This provider spoke with family about IPR. No issues or concerns at this time. Past medical history, family history, social history and allergies reviewed again and is unchanged since admission. ROS (All review of systems completed. Pertinent positives noted.  Otherwise All other systems reviewed and negative.)     Medications:  Reviewed    Infusion Medications    sodium chloride 75 mL/hr at 01/07/22 1642    sodium chloride 25 mL (01/03/22 0227)    dextrose       Scheduled Medications    sodium chloride  500 mL IntraVENous Once    insulin glargine  20 Units SubCUTAneous QAM AC    insulin lispro  0-12 Units SubCUTAneous TID WC    insulin lispro  0-6 Units SubCUTAneous Nightly    famotidine  20 mg Oral BID    furosemide  40 mg Oral Daily    rosuvastatin  10 mg Oral Daily    aspirin  81 mg Per NG tube Daily    enoxaparin  30 mg SubCUTAneous Q12H     PRN Meds: acetaminophen **OR** acetaminophen, bisacodyl, dextrose, sodium chloride flush, sodium chloride, ondansetron **OR** ondansetron, senna, polyethylene glycol, glucose, dextrose, glucagon (rDNA), dextrose, albuterol sulfate HFA      Intake/Output Summary (Last 24 hours) at 1/8/2022 1244  Last data filed at 1/8/2022 0711  Gross per 24 hour   Intake 150 ml   Output 625 ml   Net -475 ml       Diet:  ADULT DIET; Dysphagia - Soft and Bite Sized  ADULT ORAL NUTRITION SUPPLEMENT; Breakfast, Lunch, Dinner; Diabetic Oral Supplement    Exam:  BP (!) 93/56   Pulse 76   Temp 97.2 °F (36.2 °C) (Axillary)   Resp 18   Ht 5' 2\" (1.575 m)   Wt 180 lb 1.6 oz (81.7 kg)   SpO2 94%   BMI 32.94 kg/m²   General appearance: No apparent distress, appears stated age and cooperative. HEENT: Pupils equal, round, and reactive to light. Conjunctivae/corneas clear. Neck: Supple, with full range of motion. No jugular venous distention. Trachea midline. Respiratory:  Normal respiratory effort on NC. Breath sounds diminished. Cardiovascular: Regular rate and rhythm with normal S1/S2 without murmurs, rubs or gallops. Abdomen: Soft, non-tender, non-distended with normal bowel sounds. Musculoskeletal: passive and active ROM x 4 extremities. Skin: Skin color, texture, turgor normal.  No rashes or lesions. Neurologic:  Neurovascularly intact without any focal sensory/motor deficits.  Cranial nerves: II-XII intact, grossly non-focal.  Psychiatric: Alert and oriented, thought content appropriate, normal insight  Capillary Refill: Brisk,< 3 seconds   Peripheral Pulses: +2 palpable, equal bilaterally     Labs:   Recent Labs     01/07/22  1907   WBC 12.9*   HGB 13.5   HCT 38.7   *     Recent Labs     01/07/22 2030   *   K 4.0   CL 98   CO2 26   BUN 28*   CREATININE 0.7   CALCIUM 8.2*     No results for input(s): AST, ALT, BILIDIR, BILITOT, ALKPHOS in the last 72 hours. No results for input(s): INR in the last 72 hours. No results for input(s): Ernesto Min in the last 72 hours. Microbiology:    Blood culture #1:   Lab Results   Component Value Date    BC No growth-preliminary  01/02/2022    BC  01/02/2022     possible contamination; clinical correlation required     BC No growth-preliminary No growth  01/02/2022     Urine culture:   Lab Results   Component Value Date    LABURIN No growth-preliminary No growth  12/20/2021     Urinalysis:      Lab Results   Component Value Date    NITRU NEGATIVE 12/31/2021    WBCUA 0-2 12/31/2021    BACTERIA NONE SEEN 12/31/2021    RBCUA 10-15 12/31/2021    BLOODU LARGE 12/31/2021    SPECGRAV 1.026 12/31/2021       Radiology:  FL MODIFIED BARIUM SWALLOW W VIDEO   Final Result   Laryngeal penetration without aspiration with thin liquids. Recommendations and comments available from speech therapy            **This report has been created using voice recognition software. It may contain minor errors which are inherent in voice recognition technology. **      Final report electronically signed by Dr. Alfonso Bowden on 1/4/2022 11:34 AM      XR CHEST PORTABLE   Final Result   Stable mild bilateral patchy pulmonary opacities. **This report has been created using voice recognition software. It may contain minor errors which are inherent in voice recognition technology. **      Final report electronically signed by Dr. Dimitri Saunders on 1/1/2022 7:25 AM      VL DUP LOWER EXTREMITY ARTERIES LEFT   Final Result   No significant stenosis or occlusion. **This report has been created using voice recognition software. It may contain minor errors which are inherent in voice recognition technology. **      Final report electronically signed by Dr. Nirmala Cline on 12/30/2021 3:17 PM      XR CHEST PORTABLE   Final Result   No interval change. This document has been electronically signed by: Terence Yu DO on    12/28/2021 02:39 AM      XR CHEST PORTABLE   Final Result   Impression:   Bilateral consolidations. Improving left-sided infiltrates. This document has been electronically signed by: Jaron Pelletier MD on    12/25/2021 05:09 AM      XR CHEST PORTABLE   Final Result   1. Support lines and tubes as outlined above, ET tube is 4.4 cm superior    to the telma. 2. Diffuse scattered infiltrates and airspace disease throughout the    bilateral lungs. More complex consolidation involving the right lung base. This document has been electronically signed by: Terence Yu DO on    12/24/2021 03:04 AM      XR CHEST PORTABLE   Final Result   Interval placement of right-sided PICC line with catheter tip at the cavoatrial junction with stable diffuse patchy lung opacities bilaterally. **This report has been created using voice recognition software. It may contain minor errors which are inherent in voice recognition technology. **      Final report electronically signed by Dr. Raul oVgt MD on 12/21/2021 7:10 PM      XR CHEST PORTABLE   Final Result   Diffuse patchy lung opacities as evidence for diffuse pneumonia or ARDS following intubation. **This report has been created using voice recognition software. It may contain minor errors which are inherent in voice recognition technology. **      Final report electronically signed by Dr. Raul Vogt MD on 12/20/2021 7:21 PM      XR CHEST PORTABLE   Final Result   1.  Marked worsening of the diffuse airspace opacities relating to multilobar pneumonia. **This report has been created using voice recognition software. It may contain minor errors which are inherent in voice recognition technology. **      Final report electronically signed by Dr Grayson Hernandez on 12/20/2021 11:36 AM      CTA CHEST [address]   Final Result      1. No evidence of pulmonary embolism. 2. Areas of abnormal density throughout both lung fields consistent with inflammatory process, presumably Covid 19 infection. 3. Mild cardiomegaly. 4. Mild dilatation of the ascending aorta. 5. Thoracic spondylosis. 6. Hiatal hernia. 7. Hepatosplenomegaly. **This report has been created using voice recognition software. It may contain minor errors which are inherent in voice recognition technology. **      Final report electronically signed by  [facility] on 12/18/2021 3:02 PM      XR CHEST PORTABLE   Final Result   1. Diffuse groundglass opacification of both lungs with areas of interstitial thickening and nodular consolidative opacities. Findings likely relate to multilobar pneumonia. Pulmonary edema could also give this appearance. **This report has been created using voice recognition software. It may contain minor errors which are inherent in voice recognition technology. **      Final report electronically signed by Dr Grayson Hernandez on 12/18/2021 2:20 PM        Electronically signed by MICHAEL Kelley on 1/8/2022 at 12:44 PM

## 2022-01-09 LAB
GLUCOSE BLD-MCNC: 128 MG/DL (ref 70–108)
GLUCOSE BLD-MCNC: 156 MG/DL (ref 70–108)
GLUCOSE BLD-MCNC: 187 MG/DL (ref 70–108)
GLUCOSE BLD-MCNC: 190 MG/DL (ref 70–108)

## 2022-01-09 PROCEDURE — 82948 REAGENT STRIP/BLOOD GLUCOSE: CPT

## 2022-01-09 PROCEDURE — 6370000000 HC RX 637 (ALT 250 FOR IP): Performed by: NURSE PRACTITIONER

## 2022-01-09 PROCEDURE — 2580000003 HC RX 258: Performed by: PHYSICIAN ASSISTANT

## 2022-01-09 PROCEDURE — 99232 SBSQ HOSP IP/OBS MODERATE 35: CPT | Performed by: PHYSICIAN ASSISTANT

## 2022-01-09 PROCEDURE — 6360000002 HC RX W HCPCS: Performed by: PHYSICIAN ASSISTANT

## 2022-01-09 PROCEDURE — 1200000000 HC SEMI PRIVATE

## 2022-01-09 RX ORDER — HYDROXYZINE PAMOATE 25 MG/1
25 CAPSULE ORAL 4 TIMES DAILY PRN
Status: DISCONTINUED | OUTPATIENT
Start: 2022-01-09 | End: 2022-01-14 | Stop reason: HOSPADM

## 2022-01-09 RX ORDER — MIDODRINE HYDROCHLORIDE 5 MG/1
5 TABLET ORAL ONCE
Status: DISCONTINUED | OUTPATIENT
Start: 2022-01-09 | End: 2022-01-14 | Stop reason: HOSPADM

## 2022-01-09 RX ADMIN — INSULIN LISPRO 1 UNITS: 100 INJECTION, SOLUTION INTRAVENOUS; SUBCUTANEOUS at 22:34

## 2022-01-09 RX ADMIN — ENOXAPARIN SODIUM 30 MG: 100 INJECTION SUBCUTANEOUS at 12:21

## 2022-01-09 RX ADMIN — INSULIN LISPRO 2 UNITS: 100 INJECTION, SOLUTION INTRAVENOUS; SUBCUTANEOUS at 12:20

## 2022-01-09 RX ADMIN — SODIUM CHLORIDE: 9 INJECTION, SOLUTION INTRAVENOUS at 06:15

## 2022-01-09 RX ADMIN — ENOXAPARIN SODIUM 30 MG: 100 INJECTION SUBCUTANEOUS at 01:05

## 2022-01-09 RX ADMIN — FAMOTIDINE 20 MG: 20 TABLET ORAL at 22:32

## 2022-01-09 RX ADMIN — ACETAMINOPHEN 650 MG: 325 TABLET ORAL at 22:48

## 2022-01-09 RX ADMIN — INSULIN LISPRO 2 UNITS: 100 INJECTION, SOLUTION INTRAVENOUS; SUBCUTANEOUS at 17:03

## 2022-01-09 RX ADMIN — FAMOTIDINE 20 MG: 20 TABLET ORAL at 08:51

## 2022-01-09 RX ADMIN — SODIUM CHLORIDE: 9 INJECTION, SOLUTION INTRAVENOUS at 14:21

## 2022-01-09 RX ADMIN — INSULIN GLARGINE 20 UNITS: 100 INJECTION, SOLUTION SUBCUTANEOUS at 10:36

## 2022-01-09 RX ADMIN — ACETAMINOPHEN 650 MG: 325 TABLET ORAL at 15:16

## 2022-01-09 RX ADMIN — ROSUVASTATIN 10 MG: 10 TABLET, FILM COATED ORAL at 22:32

## 2022-01-09 RX ADMIN — ASPIRIN 81 MG CHEWABLE TABLET 81 MG: 81 TABLET CHEWABLE at 08:51

## 2022-01-09 RX ADMIN — ACETAMINOPHEN 650 MG: 325 TABLET ORAL at 10:42

## 2022-01-09 ASSESSMENT — PAIN SCALES - GENERAL
PAINLEVEL_OUTOF10: 2
PAINLEVEL_OUTOF10: 4
PAINLEVEL_OUTOF10: 7
PAINLEVEL_OUTOF10: 7
PAINLEVEL_OUTOF10: 0

## 2022-01-09 NOTE — PROGRESS NOTES
Pt continues to run low BP 84/51 map 63 HR 70 currently running 0.9 % sodium chloride [id number] mL/hr : IntraVENous : CONTINUOUS notified Dr. Caes Duque via [employer] he state he was okay given map at 61.

## 2022-01-09 NOTE — PROGRESS NOTES
Hospitalist Progress Note      Patient:  Delfino Loyd    Unit/Bed:8E-71/8E-71A  YOB: 1950  MRN: [id number]   Acct: [de-identified]   PCP: Yari Paez  Date of Admission: 12/18/2021    Assessment/Plan:    1. COVID 19: Patient is now out of isolation (1/6). Pt has completed Baricitnib & had toculizamb on 1/2 due to continued fevers. Pt had high dose Decadron. 2. Acute respiratory failure, hypoxia: Patient's baseline is room air. Patient is on RA. Pt was intubated on 12/20 and extubated on 1/2. Incentive spirometry. Acapella. CXR (1/1) shows stable bilateral opacities. 3. ARDS--patient was proned  4. Bacterial pneumonia with Klebsiella oxytocin--Rocephin 12/24-1/4  5. Diabetes mellitus type 2, uncontrolled--since patient is eating well we will continue Lantus 20 units daily, change sliding scale to medium dose before meals and at bedtime  6. Mild oral dysphagia/mild to moderate pharyngeal dysphagia--appreciate speech therapy input, diet is soft and bite-size with thin liquid  7. Physical deconditioning/debility--likely secondary to #1, #3 and #4; PT/OT, IPR is consulted and will see again on Monday. 8. Obesity with BMI 33.45    Dispo; Awaiting safe DC plan     Chief Complaint: [id number]    Initial H and P:-     Per H&P dated 12/18: Nash Sierra is a 73 y.o. female with PMHx of CAD, essential hypertension, type II diabetes, who presents to [facility] with shortness of breath. Patient states she began having symptoms on 12/12 of loss of appetite, changes in taste and smell, food aversions, nausea with dry heaves and diarrhea.   she originally attributed these symptoms to changes in her diabetic medications. As symptoms persisted, patient states she was advised by her PCP to go to ED at [facility] for workup and found to be COVID positive with hypoxia 70% spo2.    She decided to leave AMA per their recommendation she be admitted to their facility as she preferred to be admitted to [address] [place] [address] for treatment and family close by. She states she has felt okay in spite of measured hypoxia. She denies fevers, chills, chest pain, dizziness, numbness or tingling, abdominal pain      ED course:   Vitals on arrival 98.5F, RR 28, HR 95, /77  Labs: Lactic acid, CBC, ABG, Ferritin, CRP   EKG, CXR, CTA chest\"     12/19--> now on high flow however settings not documented as patient desatted to 83% on 6 L; stop IV fluids     12/20-->on BiPAP now despite being maxed out on high flow and was tachypneic; palliative care saw patient and she does want to be a full code and is okay with intubation     1/4->-patient was intubated on 12/20 and was extubated on 1/2; she was transferred out to stepdown on 1/3; she is hemodynamically stable, she is currently on 3 L of oxygen; speech therapy is planning a modified barium swallow today; patient is fully alert and oriented, in no distress, she is extremely weak and has a very weak voice     1/5--> hemodynamically stable, on 2 L of oxygen satting 94%; patient is alert and oriented however needs frequent redirection with her phone, she is asking for her coat \"to get out of here\"    1/6 --> No acute events overnight. Pt states that her breathing is better today. Pt states that she worked with therapy and did well. She still states that she is extremely weak. 1/7: No acute events overnight. Pt was transferred out of COVID isolation. Awaiting IPR consult. 1/8: Pt is more alert and conversational today. Pt is still very weak. This provider spoke with family about IPR. Subjective (past 24 hours):   No acute events overnight. RN states that patient has been more confused today. Behzad Sarabia. Patient was alert and conversational during my evaluation. Following commands. Patient still states that she is very weak. And states she has been very tired today.       Past medical history, family history, social history and allergies reviewed again and is unchanged since admission. ROS (All review of systems completed. Pertinent positives noted. Otherwise All other systems reviewed and negative.)     Medications:  Reviewed    Infusion Medications    sodium chloride 25 mL (01/03/22 0227)    dextrose       Scheduled Medications    insulin glargine  20 Units SubCUTAneous QAM AC    insulin lispro  0-12 Units SubCUTAneous TID WC    insulin lispro  0-6 Units SubCUTAneous Nightly    famotidine  20 mg Oral BID    furosemide  40 mg Oral Daily    rosuvastatin  10 mg Oral Daily    aspirin  81 mg Per NG tube Daily    enoxaparin  30 mg SubCUTAneous Q12H     PRN Meds: hydrOXYzine, acetaminophen **OR** acetaminophen, bisacodyl, dextrose, sodium chloride flush, sodium chloride, ondansetron **OR** ondansetron, senna, polyethylene glycol, glucose, dextrose, glucagon (rDNA), dextrose, albuterol sulfate HFA      Intake/Output Summary (Last 24 hours) at 1/9/2022 1845  Last data filed at 1/9/2022 1201  Gross per 24 hour   Intake 440 ml   Output 400 ml   Net 40 ml       Diet:  ADULT DIET; Dysphagia - Soft and Bite Sized  ADULT ORAL NUTRITION SUPPLEMENT; Breakfast, Lunch, Dinner; Diabetic Oral Supplement    Exam:  BP (!) 98/58   Pulse 89   Temp 98.3 °F (36.8 °C) (Oral)   Resp 18   Ht 5' 2\" (1.575 m)   Wt 180 lb 1.6 oz (81.7 kg)   SpO2 96%   BMI 32.94 kg/m²   General appearance: No apparent distress, appears stated age and cooperative. HEENT: Pupils equal, round, and reactive to light. Conjunctivae/corneas clear. Neck: Supple, with full range of motion. No jugular venous distention. Trachea midline. Respiratory:  Normal respiratory effort on NC. Breath sounds diminished. Cardiovascular: Regular rate and rhythm with normal S1/S2 without murmurs, rubs or gallops. Abdomen: Soft, non-tender, non-distended with normal bowel sounds. Musculoskeletal: passive and active ROM x 4 extremities.   Skin: Skin color, texture, turgor normal.  No rashes or lesions. Neurologic:  Neurovascularly intact without any focal sensory/motor deficits. Cranial nerves: II-XII intact, grossly non-focal.  Psychiatric: Alert and oriented, mildly confused  Capillary Refill: Brisk,< 3 seconds   Peripheral Pulses: +2 palpable, equal bilaterally     Labs:   Recent Labs     01/07/22 1907   WBC 12.9*   HGB 13.5   HCT 38.7   *     Recent Labs     01/07/22 2030   *   K 4.0   CL 98   CO2 26   BUN 28*   CREATININE 0.7   CALCIUM 8.2*     No results for input(s): AST, ALT, BILIDIR, BILITOT, ALKPHOS in the last 72 hours. No results for input(s): INR in the last 72 hours. No results for input(s): Katherine Ends in the last 72 hours. Microbiology:    Blood culture #1:   Lab Results   Component Value Date    BC No growth-preliminary  01/02/2022    BC  01/02/2022     possible contamination; clinical correlation required     BC No growth-preliminary No growth  01/02/2022     Urine culture:   Lab Results   Component Value Date    LABURIN No growth-preliminary No growth  12/20/2021     Urinalysis:      Lab Results   Component Value Date    NITRU NEGATIVE 12/31/2021    WBCUA 0-2 12/31/2021    BACTERIA NONE SEEN 12/31/2021    RBCUA 10-15 12/31/2021    BLOODU LARGE 12/31/2021    SPECGRAV 1.026 12/31/2021       Radiology:  FL MODIFIED BARIUM SWALLOW W VIDEO   Final Result   Laryngeal penetration without aspiration with thin liquids. Recommendations and comments available from speech therapy            **This report has been created using voice recognition software. It may contain minor errors which are inherent in voice recognition technology. **      Final report electronically signed by Dr. Gomez Sites on 1/4/2022 11:34 AM      XR CHEST PORTABLE   Final Result   Stable mild bilateral patchy pulmonary opacities. **This report has been created using voice recognition software.  It may contain minor errors which are inherent in voice recognition technology. **      Final report electronically signed by Dr. Myrtle Garza on 1/1/2022 7:25 AM      VL DUP LOWER EXTREMITY ARTERIES LEFT   Final Result   No significant stenosis or occlusion. **This report has been created using voice recognition software. It may contain minor errors which are inherent in voice recognition technology. **      Final report electronically signed by Dr. Bee Guerrero on 12/30/2021 3:17 PM      XR CHEST PORTABLE   Final Result   No interval change. This document has been electronically signed by: Roberto Nova DO on    12/28/2021 02:39 AM      XR CHEST PORTABLE   Final Result   Impression:   Bilateral consolidations. Improving left-sided infiltrates. This document has been electronically signed by: Laurence Ferrell MD on    12/25/2021 05:09 AM      XR CHEST PORTABLE   Final Result   1. Support lines and tubes as outlined above, ET tube is 4.4 cm superior    to the telma. 2. Diffuse scattered infiltrates and airspace disease throughout the    bilateral lungs. More complex consolidation involving the right lung base. This document has been electronically signed by: Roberto Nova DO on    12/24/2021 03:04 AM      XR CHEST PORTABLE   Final Result   Interval placement of right-sided PICC line with catheter tip at the cavoatrial junction with stable diffuse patchy lung opacities bilaterally. **This report has been created using voice recognition software. It may contain minor errors which are inherent in voice recognition technology. **      Final report electronically signed by Dr. Fidelia Gaffney MD on 12/21/2021 7:10 PM      XR CHEST PORTABLE   Final Result   Diffuse patchy lung opacities as evidence for diffuse pneumonia or ARDS following intubation. **This report has been created using voice recognition software. It may contain minor errors which are inherent in voice recognition technology. **      Final report electronically signed by Dr. Mary Chu MD on 12/20/2021 7:21 PM      XR CHEST PORTABLE   Final Result   1. Marked worsening of the diffuse airspace opacities relating to multilobar pneumonia. **This report has been created using voice recognition software. It may contain minor errors which are inherent in voice recognition technology. **      Final report electronically signed by Dr Violette Peña on 12/20/2021 11:36 AM      CTA CHEST [address]   Final Result      1. No evidence of pulmonary embolism. 2. Areas of abnormal density throughout both lung fields consistent with inflammatory process, presumably Covid 19 infection. 3. Mild cardiomegaly. 4. Mild dilatation of the ascending aorta. 5. Thoracic spondylosis. 6. Hiatal hernia. 7. Hepatosplenomegaly. **This report has been created using voice recognition software. It may contain minor errors which are inherent in voice recognition technology. **      Final report electronically signed by DR Mayte Hernandez on 12/18/2021 3:02 PM      XR CHEST PORTABLE   Final Result   1. Diffuse groundglass opacification of both lungs with areas of interstitial thickening and nodular consolidative opacities. Findings likely relate to multilobar pneumonia. Pulmonary edema could also give this appearance. **This report has been created using voice recognition software. It may contain minor errors which are inherent in voice recognition technology. **      Final report electronically signed by Dr Violette Peña on 12/18/2021 2:20 PM        Electronically signed by MICHAEL Johnson on 1/9/2022 at 6:45 PM

## 2022-01-10 ENCOUNTER — APPOINTMENT (OUTPATIENT)
Dept: CT IMAGING | Age: 72
DRG: 207 | End: 2022-01-10
Payer: MEDICARE

## 2022-01-10 LAB
ANION GAP SERPL CALCULATED.3IONS-SCNC: 8 MEQ/L (ref 8–16)
BACTERIA: ABNORMAL /HPF
BILIRUBIN URINE: NEGATIVE
BLOOD, URINE: ABNORMAL
BUN BLDV-MCNC: 10 MG/DL (ref 7–22)
CALCIUM SERPL-MCNC: 8 MG/DL (ref 8.5–10.5)
CASTS UA: ABNORMAL /LPF
CHARACTER, URINE: ABNORMAL
CHLORIDE BLD-SCNC: 101 MEQ/L (ref 98–111)
CO2: 23 MEQ/L (ref 23–33)
COLOR: ABNORMAL
CREAT SERPL-MCNC: 0.6 MG/DL (ref 0.4–1.2)
CRYSTALS, UA: ABNORMAL
EPITHELIAL CELLS, UA: ABNORMAL /HPF
ERYTHROCYTE [DISTWIDTH] IN BLOOD BY AUTOMATED COUNT: 13.9 % (ref 11.5–14.5)
ERYTHROCYTE [DISTWIDTH] IN BLOOD BY AUTOMATED COUNT: 42.9 FL (ref 35–45)
GFR SERPL CREATININE-BSD FRML MDRD: > 90 ML/MIN/1.73M2
GLUCOSE BLD-MCNC: 100 MG/DL (ref 70–108)
GLUCOSE BLD-MCNC: 145 MG/DL (ref 70–108)
GLUCOSE BLD-MCNC: 147 MG/DL (ref 70–108)
GLUCOSE BLD-MCNC: 204 MG/DL (ref 70–108)
GLUCOSE BLD-MCNC: 98 MG/DL (ref 70–108)
GLUCOSE URINE: NEGATIVE MG/DL
HCT VFR BLD CALC: 36 % (ref 37–47)
HEMOGLOBIN: 11.8 GM/DL (ref 12–16)
KETONES, URINE: NEGATIVE
LEUKOCYTE ESTERASE, URINE: ABNORMAL
MCH RBC QN AUTO: 29.6 PG (ref 26–33)
MCHC RBC AUTO-ENTMCNC: 32.8 GM/DL (ref 32.2–35.5)
MCV RBC AUTO: 90.5 FL (ref 81–99)
NITRITE, URINE: NEGATIVE
PH UA: 6 (ref 5–9)
PLATELET # BLD: 121 THOU/MM3 (ref 130–400)
PMV BLD AUTO: 10.2 FL (ref 9.4–12.4)
POTASSIUM REFLEX MAGNESIUM: 3.6 MEQ/L (ref 3.5–5.2)
PROTEIN UA: ABNORMAL
RBC # BLD: 3.98 MILL/MM3 (ref 4.2–5.4)
RBC URINE: > 200 /HPF
SODIUM BLD-SCNC: 132 MEQ/L (ref 135–145)
SPECIFIC GRAVITY, URINE: 1.01 (ref 1–1.03)
UROBILINOGEN, URINE: 0.2 EU/DL (ref 0–1)
WBC # BLD: 8.7 THOU/MM3 (ref 4.8–10.8)
WBC UA: ABNORMAL /HPF
YEAST: ABNORMAL

## 2022-01-10 PROCEDURE — 51701 INSERT BLADDER CATHETER: CPT

## 2022-01-10 PROCEDURE — 6370000000 HC RX 637 (ALT 250 FOR IP): Performed by: NURSE PRACTITIONER

## 2022-01-10 PROCEDURE — 87086 URINE CULTURE/COLONY COUNT: CPT

## 2022-01-10 PROCEDURE — 6360000002 HC RX W HCPCS: Performed by: PHYSICIAN ASSISTANT

## 2022-01-10 PROCEDURE — 6360000004 HC RX CONTRAST MEDICATION: Performed by: HOSPITALIST

## 2022-01-10 PROCEDURE — 74177 CT ABD & PELVIS W/CONTRAST: CPT

## 2022-01-10 PROCEDURE — 97530 THERAPEUTIC ACTIVITIES: CPT

## 2022-01-10 PROCEDURE — 99232 SBSQ HOSP IP/OBS MODERATE 35: CPT | Performed by: PHYSICIAN ASSISTANT

## 2022-01-10 PROCEDURE — 51798 US URINE CAPACITY MEASURE: CPT

## 2022-01-10 PROCEDURE — 80048 BASIC METABOLIC PNL TOTAL CA: CPT

## 2022-01-10 PROCEDURE — 85027 COMPLETE CBC AUTOMATED: CPT

## 2022-01-10 PROCEDURE — 6370000000 HC RX 637 (ALT 250 FOR IP): Performed by: PHYSICIAN ASSISTANT

## 2022-01-10 PROCEDURE — 82948 REAGENT STRIP/BLOOD GLUCOSE: CPT

## 2022-01-10 PROCEDURE — 1200000000 HC SEMI PRIVATE

## 2022-01-10 PROCEDURE — 81001 URINALYSIS AUTO W/SCOPE: CPT

## 2022-01-10 PROCEDURE — 36415 COLL VENOUS BLD VENIPUNCTURE: CPT

## 2022-01-10 RX ORDER — GRANULES FOR ORAL 3 G/1
3 POWDER ORAL ONCE
Status: COMPLETED | OUTPATIENT
Start: 2022-01-10 | End: 2022-01-10

## 2022-01-10 RX ORDER — FLUCONAZOLE 200 MG/1
200 TABLET ORAL DAILY
Status: DISCONTINUED | OUTPATIENT
Start: 2022-01-10 | End: 2022-01-14 | Stop reason: HOSPADM

## 2022-01-10 RX ADMIN — IOPAMIDOL 80 ML: 755 INJECTION, SOLUTION INTRAVENOUS at 10:59

## 2022-01-10 RX ADMIN — ROSUVASTATIN 10 MG: 10 TABLET, FILM COATED ORAL at 23:01

## 2022-01-10 RX ADMIN — ACETAMINOPHEN 650 MG: 325 TABLET ORAL at 17:20

## 2022-01-10 RX ADMIN — ENOXAPARIN SODIUM 30 MG: 100 INJECTION SUBCUTANEOUS at 01:24

## 2022-01-10 RX ADMIN — ENOXAPARIN SODIUM 30 MG: 100 INJECTION SUBCUTANEOUS at 16:45

## 2022-01-10 RX ADMIN — HYDROXYZINE PAMOATE 25 MG: 25 CAPSULE ORAL at 09:37

## 2022-01-10 RX ADMIN — GRANULES FOR ORAL SOLUTION 1 PACKET: 3 POWDER ORAL at 17:20

## 2022-01-10 RX ADMIN — FAMOTIDINE 20 MG: 20 TABLET ORAL at 22:18

## 2022-01-10 RX ADMIN — ACETAMINOPHEN 650 MG: 325 TABLET ORAL at 22:18

## 2022-01-10 RX ADMIN — ASPIRIN 81 MG CHEWABLE TABLET 81 MG: 81 TABLET CHEWABLE at 09:37

## 2022-01-10 RX ADMIN — FAMOTIDINE 20 MG: 20 TABLET ORAL at 09:37

## 2022-01-10 RX ADMIN — FLUCONAZOLE 200 MG: 200 TABLET ORAL at 17:20

## 2022-01-10 RX ADMIN — INSULIN LISPRO 2 UNITS: 100 INJECTION, SOLUTION INTRAVENOUS; SUBCUTANEOUS at 23:01

## 2022-01-10 RX ADMIN — IOHEXOL 50 ML: 240 INJECTION, SOLUTION INTRATHECAL; INTRAVASCULAR; INTRAVENOUS; ORAL at 10:59

## 2022-01-10 RX ADMIN — FUROSEMIDE 40 MG: 40 TABLET ORAL at 09:37

## 2022-01-10 ASSESSMENT — PAIN SCALES - GENERAL
PAINLEVEL_OUTOF10: 3
PAINLEVEL_OUTOF10: 8
PAINLEVEL_OUTOF10: 0

## 2022-01-10 NOTE — PROGRESS NOTES
[address] [address]  INPATIENT PHYSICAL THERAPY  DAILY NOTE  STRZ SURGE OVERFLOW - 8E-71/8E-71A    Time In: 0302  Time Out: 1508  Timed Code Treatment Minutes: 32 Minutes  Minutes: 32      co-tx with OT due to medical complexity and decreased activity tolerance. Pt requires +2 assist for mobility    Date: 1/10/2022  Patient Name: Lani Mandel,  Gender:  female        MRN: [id number]  : 1950  (73 y.o.)     Referring Practitioner: Jn Elise CNP  Diagnosis: Hypoxia  Additional Pertinent Hx: Rosalio Bonilla is a 72-year-old white female who presented to [facility] on 2021 with complaints of shortness of breath she has a past medical history lifetime non-smoker, CAD with stent placement in , hypertension, diabetes mellitus, dyslipidemia. Per report she presented to [facility] on  with complaints of hypoxia and shortness of breath. She was initially placed on 4 L nasal cannula.  patient had persistent hypoxia and was transitioned to high flow nasal cannula. On  patient was transitioned to BiPAP and transferred to [address] [place] [address] ICU for intubation. Pt extubated 1. Prior Level of Function:  Lives With: Spouse  Type of Home: House  Home Layout: One level  Home Equipment:  (None)        ADL Assistance: Independent  Homemaking Assistance: Independent  Ambulation Assistance: Independent  Transfer Assistance: Independent  Active : Yes  Additional Comments: Pt fully I prior to admission, 6 kids    Restrictions/Precautions:  Restrictions/Precautions: General Precautions,Fall Risk  Position Activity Restriction  Other position/activity restrictions: out of COVID isolation 22, monitor BP     SUBJECTIVE: RN approved session. Pt in bed upon arrival and agrees to therapy. Pt lethargic and not following commands well throughout session. Pt kept eyes closed for majority of session.    Pt did demo lean to L side and decreased scanning to L, notified RN RN reporting pts speech is also less clear than this am. RN called out and PA assessed pt at end of session. PA was able to get better responses from pt    PAIN: pt not answering questions well, pt did report \"ouch\" and grimaced with rolling and sit > supine    Vitals: Blood Pressure: 93/61 MAP 66 HR 81 SpO2 92-93%, sitting 99/72    OBJECTIVE:  Bed Mobility:  Rolling to Left: Maximum Assistance, X 2, with verbal cues , with increased time for completion   Rolling to Right: Maximum Assistance, X 2, with verbal cues , with increased time for completion   Supine to Sit: Maximum Assistance, X 2, with rail, with verbal cues , with increased time for completion  Sit to Supine: Maximum Assistance, X 2, with verbal cues , with increased time for completion   Scooting: Dependent, to boost to [facility], max to scoot to EOB  Pt demos poor participation this date    Balance:  Pt sat EOB 15 mins total during session, with Mario UEs on bed, cued pt for tasks however not following directions well, pt leans to L throughout, cues for weigt shift forward. OT attempted many UE tasks with pt. increased time spent getting pt to respond/attempt activity. decreased scaning to L noted. cued to keep head up and eyes open    Exercise:  Patient was guided in 1 set(s) 10 reps of exercise to both lower extremities. Long arc quads. Did get pt to grasp mario hands equally at beginning of session. Exercises were completed for increased independence with functional mobility. Functional Outcome Measures: Completed  AM-PAC Inpatient Mobility Raw Score : 6  AM-PAC Inpatient T-Scale Score : 23.55    ASSESSMENT:  Assessment: Patient progressing toward established goals. Activity Tolerance:  Patient tolerance of  treatment: good. Pt unsteady and lethargic, pt having difficulty following directions. Pt will require cont PT at this time     Equipment Recommendations: Other: will monitor for needs pending progress and DC destination  Discharge Recommendations: Inpatient Therapy Stay  Plan: Times per week: 5x GM  Current Treatment Recommendations: Strengthening,Positioning,Patient/Caregiver Education & Training,Safety Education & Training,Balance Training,Endurance Training,Functional Mobility Training,Neuromuscular Re-education    Patient Education  Patient Education: Plan of Care, Bed Mobility, Verbal Exercise Instruction    Goals:  Patient goals : to get stronger  Short term goals  Time Frame for Short term goals: by discharge  Short term goal 1: Pt to roll L and R In bed with mod A to improve bed mobility. Short term goal 2: Pt to transfer supine <--> sit min A to enable pt to get in/out of bed. Short term goal 3: Pt to sit EOB with SBA x 10 minutes for improved upright tolerance in prep for transfers/gait. Short term goal 4: PT to assess transfers/gait. Long term goals  Time Frame for Long term goals : NA due to short length of stay. Following session, patient left in safe position with all fall risk precautions in place.

## 2022-01-10 NOTE — PROGRESS NOTES
RN notified Dr. Alexis Murphy via Perfect serve. Pt Bp running low 74/51 MAP55 HR 80 Spo2 83% order obtained for midodrine (PROAMATINE) tablet 5 mg. Due to Pts SPo2 of 83%, Pt started on 3L O2 per NC. O2 sat increased to 92% with 3L. Previous shift removed pts Wen catheter and per Rock Epstein RN when cleaning flaquito area cleaned stool out from vagina, notified Dr. Alexis Murphy of this concern and he will place order for CT ABDOMEN PELVIS W IV CONTRAST to r/o a fistula. Asked Dr. Alexis Murphy for a Urology consult. UA was order was placed. Pt has history of CMP with EF 30-35% and given her low Blood pressures her Lasix has been held, per [facility], She has not had any real output recent bladder scan showed 15ml. She has had appox 640ml intake. She has bilateral hand swelling her 0.9 % sodium chloride infusion Rate: 125 mL/hr CONTINUOUS this ended Sun Jan 09, 2022 1814, we will not restart at this time.

## 2022-01-10 NOTE — PROGRESS NOTES
[address] Continence Nurse  Progress Note       Micaela Sierra  AGE: 73 y.o. GENDER: female  : 1950  TODAY'S DATE:  1/10/2022    Plan:     Wound ostomy consulted for red open area to coccyx. Spoke with primary RN to review reason for consult and wound appearance. Recommend staff to follow wound and Lázaro order sets. Apply EPC cream BID and PRN. Continue to turn every 2 hours and PRN, offload with pillows, ensure patient is on low air loss support surface [employer], SPR+, Janelle, Bariatric bed), utilize moisture wicking underpads, limit use of depends, and if applicable, use waffle cushion when in chair. Wound ostomy to sign off at this time. Call as needed.      Specialty Bed Required :   [x] Low Air Loss   [x] Pressure Redistribution  [] Fluid Immersion- Dolphin  [] Bariatric  [] RotoProne   [] Other:

## 2022-01-10 NOTE — PROGRESS NOTES
At 1830, patient becoming more confused per the patients . Unable to obtain urine specimen to send down for a UA. Removed werner catheter and when cleaning flaquito area cleaned stool out from vagina, and used septic technique to obtain a UA from a new disposable werner related to blood clot obstructing catheter. Will continue to monitor.

## 2022-01-10 NOTE — PROGRESS NOTES
[facility]  Occupational Therapy  Daily Note  Time:   Time In: 1436  Time Out: 1508  Timed Code Treatment Minutes: 32 Minutes  Minutes: 32    CO-TX with PT due to medical complexity, requiring A of 2 persons. Date: 1/10/2022  Patient Name: Berkley Reveles,   Gender: female      Room: 8E-/8E-71  MRN: [id number]  : 1950  (73 y.o.)  Referring Practitioner: WYATT Guzman CNP  Diagnosis: hypoxia  Additional Pertinent Hx: Katherine Pham is a 42-year-old white female who presented to [facility] on 2021 with complaints of shortness of breath she has a past medical history lifetime non-smoker, CAD with stent placement in , hypertension, diabetes mellitus, dyslipidemia. Per report she presented to [facility] on  with complaints of hypoxia and shortness of breath. She was initially placed on 4 L nasal cannula.  patient had persistent hypoxia and was transitioned to high flow nasal cannula. On  patient was transitioned to BiPAP and transferred to [place] ICU for intubation. Pt extubated . Restrictions/Precautions:  Restrictions/Precautions: General Precautions,Fall Risk  Position Activity Restriction  Other position/activity restrictions: out of COVID isolation 22, monitor BP     SUBJECTIVE: RN approved session. Pt in bed upon arrival and agrees to therapy. Pt lethargic and not following commands well throughout session. Pt kept eyes closed for majority of session. Pt did demo lean to L side and decreased scanning to L, notified YVETTE Callejas reporting pts speech is also less clear than this am. RN called out and PA assessed pt at end of session.  PA was able to get better responses from pt.      PAIN: Pt reported   \"ouch\" and grimaced with rolling and sit > supine, unable to describe further     COGNITION: poor ability to follow commands, decreased attention      Vitals: Blood Pressure: 93/61 MAP 66 HR 81 SpO2 92-93%, sitting 99/72     Bed Mobility:  Rolling to Left: Maximum Assistance, X 2, with verbal cues , with increased time for completion   Rolling to Right: Maximum Assistance, X 2, with verbal cues , with increased time for completion   Supine to Sit: Maximum Assistance, X 2, with rail, with verbal cues , with increased time for completion  Sit to Supine: Maximum Assistance, X 2, with verbal cues , with increased time for completion   Scooting: Dependent, to boost to [facility], max to scoot to EOB  Pt demos poor participation this date, difficulty following commands      Balance:  Pt sat EOB 15 mins total during session with max A of 1 with BUE support. Pt demo poor ability to follow commands, attempted to engage in to scap mobilizations with no carry over, requiring total A to complete elevation/depression. Pt declined to release 1UE to complete within DOMENICO tasks, unable to follow commands. Pt was able to follow commands to do PT ex able to release 1LE, not UE however. Pt unable to sustain grasp on comb, requiring total A to bring up to hair. Pt was able to squeeze with B hands demo'ing poor grasp. Noted L lateral lean and posterior lean requiring max A consistently throughout session. ASSESSMENT:     Activity Tolerance:  Patient tolerance of  treatment: fair.        Discharge Recommendations: Subacute/skilled nursing facility  Equipment Recommendations: Equipment Needed:  (defer to next level of care)  Plan: Times per week: 5x  Times per day: Daily  Current Treatment Recommendations: Strengthening,Balance Training,Functional Mobility Training,Endurance Training,Home Management Training,Patient/Caregiver Education & Training,Self-Care / ADL,Safety Education & Training    Patient Education  Patient Education: Role of OT and sitting balance, command following     Goals  Short term goals  Time Frame for Short term goals: by discharge  Short term goal 1: Pt will tolerate sitting on EOB >10 min with no > than Min A x1 and min VC while maintaining O2 >90% for increased indep with ADLs  Short term goal 2: Pt will complete EOB ADLs with Mod A for AAROM and min VC for increased indep with grooming  Short term goal 3: Pt will complete BUE AROM/AAROM with no > than Min A and min VC to increase UB strength and endurance to increase indep with ADLs  Short term goal 4: Pt will tolerate OTR to assess t/fs and mobility when appropriate    Following session, patient left in safe position with all fall risk precautions in place.

## 2022-01-10 NOTE — PROGRESS NOTES
Hospitalist Progress Note      Patient:  David Turk    Unit/Bed:8E-71/8E-71A  YOB: 1950  MRN: [id number]   Acct: [de-identified]   PCP: Bar Millan  Date of Admission: 12/18/2021    Assessment/Plan:    1. COVID 19: Patient is now out of isolation (1/6). Pt has completed Baricitnib & had toculizamb on 1/2 due to continued fevers. Pt had high dose Decadron. 2. Acute respiratory failure, hypoxia: Patient's baseline is room air. Patient is on RA. Pt was intubated on 12/20 and extubated on 1/2. Incentive spirometry. Acapella. CXR (1/1) shows stable bilateral opacities. 3. Possible Fistula: RN stated that she thought stool was in patient's vagina. CT A/P with oral contrast showed no fistula. One dose of Fosfomycin given as prophylaxis. 4. ARDS--patient was proned  5. Bacterial pneumonia with Klebsiella oxytocin--Rocephin 12/24-1/4  6. Diabetes mellitus type 2, uncontrolled--since patient is eating well we will continue Lantus 20 units daily, change sliding scale to medium dose before meals and at bedtime  7. Mild oral dysphagia/mild to moderate pharyngeal dysphagia--appreciate speech therapy input, diet is soft and bite-size with thin liquid  8. Physical deconditioning/debility--likely secondary to #1, #3 and #4; PT/OT, Pt was very weak after CT scan. 9. Obesity with BMI 33.45    Dispo; Awaiting safe DC plan     Chief Complaint: [id number]    Initial H and P:-     Per H&P dated 12/18: Bela Sierra is a 73 y.o. female with PMHx of CAD, essential hypertension, type II diabetes, who presents to [facility] with shortness of breath. Patient states she began having symptoms on 12/12 of loss of appetite, changes in taste and smell, food aversions, nausea with dry heaves and diarrhea.   she originally attributed these symptoms to changes in her diabetic medications.    As symptoms persisted, patient states she was advised by her PCP to go to ED at [facility] for workup and found to be COVID positive with hypoxia 70% spo2. She decided to leave AMA per their recommendation she be admitted to their facility as she preferred to be admitted to [address] [place] [address] for treatment and family close by. She states she has felt okay in spite of measured hypoxia. She denies fevers, chills, chest pain, dizziness, numbness or tingling, abdominal pain      ED course:   Vitals on arrival 98.5F, RR 28, HR 95, /77  Labs: Lactic acid, CBC, ABG, Ferritin, CRP   EKG, CXR, CTA chest\"     12/19--> now on high flow however settings not documented as patient desatted to 83% on 6 L; stop IV fluids     12/20-->on BiPAP now despite being maxed out on high flow and was tachypneic; palliative care saw patient and she does want to be a full code and is okay with intubation     1/4->-patient was intubated on 12/20 and was extubated on 1/2; she was transferred out to stepdown on 1/3; she is hemodynamically stable, she is currently on 3 L of oxygen; speech therapy is planning a modified barium swallow today; patient is fully alert and oriented, in no distress, she is extremely weak and has a very weak voice     1/5--> hemodynamically stable, on 2 L of oxygen satting 94%; patient is alert and oriented however needs frequent redirection with her phone, she is asking for her coat \"to get out of here\"    1/6 --> No acute events overnight. Pt states that her breathing is better today. Pt states that she worked with therapy and did well. She still states that she is extremely weak. 1/7: No acute events overnight. Pt was transferred out of COVID isolation. Awaiting IPR consult. 1/8: Pt is more alert and conversational today. Pt is still very weak. This provider spoke with family about IPR.     1/9:  RN states that patient has been more confused today. Behzad Sarabia. Patient was alert and conversational during my evaluation. Following commands.   Patient still states that she is very weak. And states she has been very tired today. Subjective (past 24 hours):   No acute events overnight. Past medical history, family history, social history and allergies reviewed again and is unchanged since admission. ROS (All review of systems completed. Pertinent positives noted. Otherwise All other systems reviewed and negative.)     Medications:  Reviewed    Infusion Medications    sodium chloride 25 mL (01/03/22 1001)    dextrose       Scheduled Medications    fosfomycin tromethamine  3 g Oral Once    fluconazole  200 mg Oral Daily    midodrine  5 mg Oral Once    insulin glargine  20 Units SubCUTAneous QAM AC    insulin lispro  0-12 Units SubCUTAneous TID WC    insulin lispro  0-6 Units SubCUTAneous Nightly    famotidine  20 mg Oral BID    furosemide  40 mg Oral Daily    rosuvastatin  10 mg Oral Daily    aspirin  81 mg Per NG tube Daily    enoxaparin  30 mg SubCUTAneous Q12H     PRN Meds: hydrOXYzine, acetaminophen **OR** acetaminophen, bisacodyl, dextrose, sodium chloride flush, sodium chloride, ondansetron **OR** ondansetron, senna, polyethylene glycol, glucose, dextrose, glucagon (rDNA), dextrose, albuterol sulfate HFA      Intake/Output Summary (Last 24 hours) at 1/10/2022 1642  Last data filed at 1/10/2022 0555  Gross per 24 hour   Intake 300 ml   Output --   Net 300 ml       Diet:  ADULT DIET; Dysphagia - Soft and Bite Sized  ADULT ORAL NUTRITION SUPPLEMENT; Breakfast, Lunch, Dinner; Diabetic Oral Supplement    Exam:  /82   Pulse 78   Temp 98 °F (36.7 °C) (Oral)   Resp 18   Ht 5' 2\" (1.575 m)   Wt 190 lb 9.6 oz (86.5 kg)   SpO2 95%   BMI 34.86 kg/m²   General appearance: No apparent distress, appears stated age and cooperative. HEENT: Pupils equal, round, and reactive to light. Conjunctivae/corneas clear. Neck: Supple, with full range of motion. No jugular venous distention. Trachea midline. Respiratory:  Normal respiratory effort on NC.  Breath sounds diminished. Cardiovascular: Regular rate and rhythm with normal S1/S2 without murmurs, rubs or gallops. Abdomen: Soft, non-tender, non-distended with normal bowel sounds. Musculoskeletal: passive and active ROM x 4 extremities. Skin: Skin color, texture, turgor normal.  No rashes or lesions. Neurologic:  Neurovascularly intact without any focal sensory/motor deficits. Cranial nerves: II-XII intact, grossly non-focal.  Psychiatric: Alert and oriented, mildly confused  Capillary Refill: Brisk,< 3 seconds   Peripheral Pulses: +2 palpable, equal bilaterally     Labs:   Recent Labs     01/07/22  1907 01/10/22  1536   WBC 12.9* 8.7   HGB 13.5 11.8*   HCT 38.7 36.0*   * 121*     Recent Labs     01/07/22  2030 01/10/22  1536   * 132*   K 4.0 3.6   CL 98 101   CO2 26 23   BUN 28* 10   CREATININE 0.7 0.6   CALCIUM 8.2* 8.0*     No results for input(s): AST, ALT, BILIDIR, BILITOT, ALKPHOS in the last 72 hours. No results for input(s): INR in the last 72 hours. No results for input(s): Skippy Gault in the last 72 hours. Microbiology:    Blood culture #1:   Lab Results   Component Value Date    BC No growth-preliminary  01/02/2022    BC  01/02/2022     possible contamination; clinical correlation required     BC No growth-preliminary No growth  01/02/2022     Urine culture:   Lab Results   Component Value Date    LABURIN No growth-preliminary No growth  12/20/2021     Urinalysis:      Lab Results   Component Value Date    NITRU NEGATIVE 01/10/2022    WBCUA 15-25 01/10/2022    BACTERIA NONE 01/10/2022    RBCUA > 200 01/10/2022    BLOODU LARGE 01/10/2022    SPECGRAV 1.026 12/31/2021    GLUCOSEU NEGATIVE 01/10/2022       Radiology:  CT ABDOMEN PELVIS W IV CONTRAST Additional Contrast? Oral   Final Result   1.  There is a segment of colonic wall thickening involving the sigmoid colon as well as the descending colon with pericolonic strandy opacity and diverticular disease along the sigmoid colon (most consistent with diverticulitis. There is a small amount    of gas within the urinary bladder empty dependently on axial image 70 which is nonspecific but could be related to recent catheterization. Correlate clinically. There is no gross evidence of oral contrast within the urinary bladder on the current    examination. 2. Limited evaluation of the lung bases demonstrates mild dependent bibasilar opacities which may represent mild atelectasis or pneumonia. Small bilateral pleural effusions are seen. 3. There is a trace amount of ascitic fluid in the perihepatic and perisplenic regions   No acute osseous findings are seen. Lower lumbar facet arthrosis is demonstrated. **This report has been created using voice recognition software. It may contain minor errors which are inherent in voice recognition technology. **      Final report electronically signed by Dr. Karen Og on 1/10/2022 12:13 PM      FL MODIFIED BARIUM SWALLOW W VIDEO   Final Result   Laryngeal penetration without aspiration with thin liquids. Recommendations and comments available from speech therapy            **This report has been created using voice recognition software. It may contain minor errors which are inherent in voice recognition technology. **      Final report electronically signed by Dr. Antoinette Hollis on 1/4/2022 11:34 AM      XR CHEST PORTABLE   Final Result   Stable mild bilateral patchy pulmonary opacities. **This report has been created using voice recognition software. It may contain minor errors which are inherent in voice recognition technology. **      Final report electronically signed by Dr. Terry Lam on 1/1/2022 7:25 AM      VL DUP LOWER EXTREMITY ARTERIES LEFT   Final Result   No significant stenosis or occlusion. **This report has been created using voice recognition software. It may contain minor errors which are inherent in voice recognition technology. **      Final report electronically signed by Dr. Juancho Strauss on 12/30/2021 3:17 PM      XR CHEST PORTABLE   Final Result   No interval change. This document has been electronically signed by: Chantal Dooley DO on    12/28/2021 02:39 AM      XR CHEST PORTABLE   Final Result   Impression:   Bilateral consolidations. Improving left-sided infiltrates. This document has been electronically signed by: Claribel Orozco MD on    12/25/2021 05:09 AM      XR CHEST PORTABLE   Final Result   1. Support lines and tubes as outlined above, ET tube is 4.4 cm superior    to the telma. 2. Diffuse scattered infiltrates and airspace disease throughout the    bilateral lungs. More complex consolidation involving the right lung base. This document has been electronically signed by: Chantal Dooley DO on    12/24/2021 03:04 AM      XR CHEST PORTABLE   Final Result   Interval placement of right-sided PICC line with catheter tip at the cavoatrial junction with stable diffuse patchy lung opacities bilaterally. **This report has been created using voice recognition software. It may contain minor errors which are inherent in voice recognition technology. **      Final report electronically signed by Dr. Angella Freeman MD on 12/21/2021 7:10 PM      XR CHEST PORTABLE   Final Result   Diffuse patchy lung opacities as evidence for diffuse pneumonia or ARDS following intubation. **This report has been created using voice recognition software. It may contain minor errors which are inherent in voice recognition technology. **      Final report electronically signed by Dr. Angella Freeman MD on 12/20/2021 7:21 PM      XR CHEST PORTABLE   Final Result   1. Marked worsening of the diffuse airspace opacities relating to multilobar pneumonia. **This report has been created using voice recognition software. It may contain minor errors which are inherent in voice recognition technology. **      Final report electronically signed by Dr Duarte Poole on 12/20/2021 11:36 AM      CTA CHEST [address]   Final Result      1. No evidence of pulmonary embolism. 2. Areas of abnormal density throughout both lung fields consistent with inflammatory process, presumably Covid 19 infection. 3. Mild cardiomegaly. 4. Mild dilatation of the ascending aorta. 5. Thoracic spondylosis. 6. Hiatal hernia. 7. Hepatosplenomegaly. **This report has been created using voice recognition software. It may contain minor errors which are inherent in voice recognition technology. **      Final report electronically signed by DR Luan Kay on 12/18/2021 3:02 PM      XR CHEST PORTABLE   Final Result   1. Diffuse groundglass opacification of both lungs with areas of interstitial thickening and nodular consolidative opacities. Findings likely relate to multilobar pneumonia. Pulmonary edema could also give this appearance. **This report has been created using voice recognition software. It may contain minor errors which are inherent in voice recognition technology. **      Final report electronically signed by Dr Duarte Poole on 12/18/2021 2:20 PM        Electronically signed by MICHAEL Spaulding on 1/10/2022 at 4:42 PM

## 2022-01-11 LAB
GLUCOSE BLD-MCNC: 131 MG/DL (ref 70–108)
GLUCOSE BLD-MCNC: 183 MG/DL (ref 70–108)
GLUCOSE BLD-MCNC: 209 MG/DL (ref 70–108)
GLUCOSE BLD-MCNC: 258 MG/DL (ref 70–108)

## 2022-01-11 PROCEDURE — 51798 US URINE CAPACITY MEASURE: CPT

## 2022-01-11 PROCEDURE — 1200000000 HC SEMI PRIVATE

## 2022-01-11 PROCEDURE — 6370000000 HC RX 637 (ALT 250 FOR IP): Performed by: NURSE PRACTITIONER

## 2022-01-11 PROCEDURE — 6360000002 HC RX W HCPCS: Performed by: PHYSICIAN ASSISTANT

## 2022-01-11 PROCEDURE — 99232 SBSQ HOSP IP/OBS MODERATE 35: CPT | Performed by: PHYSICIAN ASSISTANT

## 2022-01-11 PROCEDURE — 99231 SBSQ HOSP IP/OBS SF/LOW 25: CPT | Performed by: NURSE PRACTITIONER

## 2022-01-11 PROCEDURE — 6370000000 HC RX 637 (ALT 250 FOR IP): Performed by: PHYSICIAN ASSISTANT

## 2022-01-11 PROCEDURE — 82948 REAGENT STRIP/BLOOD GLUCOSE: CPT

## 2022-01-11 RX ADMIN — INSULIN LISPRO 2 UNITS: 100 INJECTION, SOLUTION INTRAVENOUS; SUBCUTANEOUS at 18:00

## 2022-01-11 RX ADMIN — FUROSEMIDE 40 MG: 40 TABLET ORAL at 08:49

## 2022-01-11 RX ADMIN — FAMOTIDINE 20 MG: 20 TABLET ORAL at 08:49

## 2022-01-11 RX ADMIN — INSULIN LISPRO 3 UNITS: 100 INJECTION, SOLUTION INTRAVENOUS; SUBCUTANEOUS at 20:25

## 2022-01-11 RX ADMIN — INSULIN LISPRO 2 UNITS: 100 INJECTION, SOLUTION INTRAVENOUS; SUBCUTANEOUS at 12:49

## 2022-01-11 RX ADMIN — ACETAMINOPHEN 650 MG: 325 TABLET ORAL at 15:46

## 2022-01-11 RX ADMIN — ROSUVASTATIN 10 MG: 10 TABLET, FILM COATED ORAL at 20:24

## 2022-01-11 RX ADMIN — ACETAMINOPHEN 650 MG: 325 TABLET ORAL at 08:49

## 2022-01-11 RX ADMIN — ENOXAPARIN SODIUM 30 MG: 100 INJECTION SUBCUTANEOUS at 00:05

## 2022-01-11 RX ADMIN — FLUCONAZOLE 200 MG: 200 TABLET ORAL at 08:49

## 2022-01-11 RX ADMIN — ENOXAPARIN SODIUM 30 MG: 100 INJECTION SUBCUTANEOUS at 12:49

## 2022-01-11 RX ADMIN — ASPIRIN 81 MG CHEWABLE TABLET 81 MG: 81 TABLET CHEWABLE at 08:50

## 2022-01-11 RX ADMIN — FAMOTIDINE 20 MG: 20 TABLET ORAL at 20:24

## 2022-01-11 ASSESSMENT — PAIN SCALES - GENERAL
PAINLEVEL_OUTOF10: 2
PAINLEVEL_OUTOF10: 3
PAINLEVEL_OUTOF10: 8

## 2022-01-11 NOTE — PROGRESS NOTES
Physical Medicine & Rehabilitation   Progress Note      Bernardo James, APRN - CNP      Chief Complaint:  COVID-19 infection; Rehab needs    Subjective:  Patient seen in her room on 8E. Patient is awake this morning, daughter at bedside. Patient states she is \"wiped out\" this morning. Patient has not worked with therapy today. Discussed with patient and daughter about therapy tolerance and not appropriate for IPR at this time. Explained the other factor is that there are no IPR rooms available. Daughter asks Dangelo Suazo is it? She cant do therapy or there isn't a room? \"  Explained 3 hours of therapy for her at her current level would not be recommended, even if a bed available, IPR would not be recommended. Daughter feels that if patient received more consistent therapy, she would be doing better. Discussed max 2 assist at this time and unfortunately with short staffing, the therapy department is doing the best they can. Rehabilitation:  PT:   Bed Mobility:  Rolling to Left: Maximum Assistance, X 2, with verbal cues , with increased time for completion   Rolling to Right: Maximum Assistance, X 2, with verbal cues , with increased time for completion   Supine to Sit: Maximum Assistance, X 2, with rail, with verbal cues , with increased time for completion  Sit to Supine: Maximum Assistance, X 2, with verbal cues , with increased time for completion   Scooting: Dependent, to boost to [facility], max to scoot to EOB  Pt demos poor participation this date  Balance:  Pt sat EOB 15 mins total during session, with Mario UEs on bed, cued pt for tasks however not following directions well, pt leans to L throughout, cues for weigt shift forward. OT attempted many UE tasks with pt. increased time spent getting pt to respond/attempt activity. decreased scaning to L noted. cued to keep head up and eyes open  Exercise:  Patient was guided in 1 set(s) 10 reps of exercise to both lower extremities. Long arc quads.  Did get pt to grasp margarita hands equally at beginning of session. Exercises were completed for increased independence with functional mobility. OT:     Bed Mobility:  Rolling to Left: Maximum Assistance, X 2, with verbal cues , with increased time for completion   Rolling to Right: Maximum Assistance, X 2, with verbal cues , with increased time for completion   Supine to Sit: Maximum Assistance, X 2, with rail, with verbal cues , with increased time for completion  Sit to [place] L, X 2, with verbal cues , with increased time for completion   Scooting: Dependent, to boost to [facility], max to scoot to EOB  Pt demos poor participation this date, difficulty following commands   Balance:  Pt sat EOB 15 mins total during session with max A of 1 with BUE support. Pt demo poor ability to follow commands, attempted to engage in to scap mobilizations with no carry over, requiring total A to complete elevation/depression. Pt declined to release 1UE to complete within DOMENICO tasks, unable to follow commands. Pt was able to follow commands to do PT ex able to release 1LE, not UE however. Pt unable to sustain grasp on comb, requiring total A to bring up to hair. Pt was able to squeeze with B hands demo'ing poor grasp. Noted L lateral lean and posterior lean requiring max A consistently throughout session. ST:   INTERVENTIONS: Prior to completion of soft and bite sized trials, ST completed comprehensive oral hygiene to clear colonization of bacteria. Patient cooperative; however demonstrated mild difficulties in following basic 1-2 step commands throughout task. PO trials of soft and bite sized revealed adequate oral acceptance of spoon with weakened labial seal resulting in mild anterior spillage. Patient with adequate mastication, suspect adequate bolus manipulation/control, timely AP transit and timely swallow initiation.  Thin liquids via straw revealed poor intraoral pressure, resulting in prolonged time for material to reach oral cavity. Again, suspect weakened labial seal as evidenced by mild anterior spillage of liquids. Suspect adequate bolus control, timely AP transit and timely swallow initiation. NO overt s/s of penetration/aspiration present at bedside; however, certainly cannot r/o without formal imaging, which is not warranted at this time.  ST recommends patient continue on soft and bite sized diet/thin liquids per most recent MBS recommendations.       Review of Systems :   CONSTITUTIONAL:  positive for  fatigue  EYES:  negative  HEENT:  negative  RESPIRATORY:  positive for  dyspnea  CARDIOVASCULAR:  negative  GASTROINTESTINAL:  negative for constipation  GENITOURINARY:  positive for urinary retention with bladder program  SKIN:  negative  HEMATOLOGIC/LYMPHATIC:  positive for swelling/edema  MUSCULOSKELETAL:  positive for  muscle weakness  NEUROLOGICAL:  positive for weakness  BEHAVIOR/PSYCH:  Positive for increased sleep  System review otherwise negative      Objective:  /63   Pulse 87   Temp 97.6 °F (36.4 °C) (Oral)   Resp 16   Ht 5' 2\" (1.575 m)   Wt 190 lb 9.6 oz (86.5 kg)   SpO2 93%   BMI 34.86 kg/m²   awake  Orientation:   person, place  Mood: within normal limits  Affect: calm  General appearance: Patient is well nourished, well developed, well groomed and in no acute distress, overweight, appearing stated age  Memory:   Normal with conversation,   Attention/Concentration: normal  Language:  normal  Cranial Nerves:  cranial nerves II-XII are grossly intact  Sensory:  Sensory intact  Gait: not tested    Diagnostics:   Recent Results (from the past 24 hour(s))   POCT glucose    Collection Time: 01/10/22 11:21 AM   Result Value Ref Range    POC Glucose 98 70 - 108 mg/dl   Urine with Reflexed Micro    Collection Time: 01/10/22  1:30 PM   Result Value Ref Range    Glucose, Ur NEGATIVE NEGATIVE mg/dl    Bilirubin Urine NEGATIVE NEGATIVE    Ketones, Urine NEGATIVE NEGATIVE    Specific Gravity, Urine 1.015 1.002 - 1.030 Blood, Urine LARGE (A) NEGATIVE    pH, UA 6.0 5.0 - 9.0    Protein, UA TRACE (A) NEGATIVE    Urobilinogen, Urine 0.2 0.0 - 1.0 eu/dl    Nitrite, Urine NEGATIVE NEGATIVE    Leukocyte Esterase, Urine TRACE (A) NEGATIVE    Color, UA NAOMI (A) STRAW-YELLOW    Character, Urine CLOUDY (A) CLEAR-SL CLOUD    RBC, UA > 200 0-2/hpf /hpf    WBC, UA 15-25 0-4/hpf /hpf    Epithelial Cells, UA 0-2 3-5/hpf /hpf    Bacteria, UA NONE FEW/NONE SEEN /hpf    Casts UA NONE SEEN NONE SEEN /lpf    Crystals, UA NONE SEEN NONE SEEN    Yeast, UA FEW NONE SEEN   Culture, Reflexed, Urine    Collection Time: 01/10/22  1:30 PM    Specimen: Urine, catheter   Result Value Ref Range    Urine Culture Reflex No growth-preliminary     CBC    Collection Time: 01/10/22  3:36 PM   Result Value Ref Range    WBC 8.7 4.8 - 10.8 thou/mm3    RBC 3.98 (L) 4.20 - 5.40 mill/mm3    Hemoglobin 11.8 (L) 12.0 - 16.0 gm/dl    Hematocrit 36.0 (L) 37.0 - 47.0 %    MCV 90.5 81.0 - 99.0 fL    MCH 29.6 26.0 - 33.0 pg    MCHC 32.8 32.2 - 35.5 gm/dl    RDW-CV 13.9 11.5 - 14.5 %    RDW-SD 42.9 35.0 - 45.0 fL    Platelets 380 (L) 408 - 400 thou/mm3    MPV 10.2 9.4 - 12.4 fL   Basic Metabolic Panel w/ Reflex to MG    Collection Time: 01/10/22  3:36 PM   Result Value Ref Range    Sodium 132 (L) 135 - 145 meq/L    Potassium reflex Magnesium 3.6 3.5 - 5.2 meq/L    Chloride 101 98 - 111 meq/L    CO2 23 23 - 33 meq/L    Glucose 147 (H) 70 - 108 mg/dL    BUN 10 7 - 22 mg/dL    CREATININE 0.6 0.4 - 1.2 mg/dL    Calcium 8.0 (L) 8.5 - 10.5 mg/dL   Anion Gap    Collection Time: 01/10/22  3:36 PM   Result Value Ref Range    Anion Gap 8.0 8.0 - 16.0 meq/L   Glomerular Filtration Rate, Estimated    Collection Time: 01/10/22  3:36 PM   Result Value Ref Range    Est, Glom Filt Rate >90 ml/min/1.73m2   POCT glucose    Collection Time: 01/10/22  4:53 PM   Result Value Ref Range    POC Glucose 145 (H) 70 - 108 mg/dl   POCT glucose    Collection Time: 01/10/22  9:18 PM   Result Value Ref

## 2022-01-11 NOTE — PROGRESS NOTES
Patient assisted to the chair 2 person assist with jose haider. Patient tolerated fairly well. Susu, [address] aware of low output today. Fluids encouraged. Patient straight cathed for 275 tea colored urine. Fluids encouraged again to both patient and family. Will continue to assess monitor.

## 2022-01-11 NOTE — PROGRESS NOTES
SERVANDO talked with patient, spouse and daughter about placement. SERVANDO was given [address] of [address], [address] [facility], and [facility]. SERVANDO called [facility] talked to [employer]  SERVANDO called [address] [facility] talked to [employer]. Kimberly is going to review clinicals and bed availability and get back with SERVANDO. Kimberly called back and left a message had a few questions about patient. SERVANDO called Kimberly back. Patient is not on oxygen and is not a tube feed. Patient is 1 to 1 feed soft bite sized and is a bladder scan or straight cath every 6 hours.

## 2022-01-11 NOTE — PROGRESS NOTES
Hospitalist Progress Note      Patient:  Jono Bosch    Unit/Bed:8E-71/8E-71A  YOB: 1950  MRN: [id number]   Acct: [de-identified]   PCP: Lidia Medeiros  Date of Admission: 12/18/2021    Assessment/Plan:    1. COVID 19: Patient is now out of isolation as of 1/6. Pt has completed Baricitnib & had toculizamb on 1/2 due to continued fevers. Pt had high dose Decadron. 2. Acute respiratory failure, hypoxia: Patient's baseline is room air. Patient is on RA. Pt was intubated on 12/20 and extubated on 1/2. Incentive spirometry. Acapella. CXR (1/1) shows stable bilateral opacities. 3. Possible Fistula: RN stated that she thought stool was in patient's vagina. CT A/P with oral contrast showed no fistula. One dose of Fosfomycin given as prophylaxis. 4. ARDS--patient was proned  5. Bacterial pneumonia with Klebsiella oxytocin--Rocephin 12/24-1/4  6. Diabetes mellitus type 2, uncontrolled--since patient is eating well we will continue Lantus 20 units daily, change sliding scale to medium dose before meals and at bedtime  7. Mild oral dysphagia/mild to moderate pharyngeal dysphagia--appreciate speech therapy input, diet is soft and bite-size with thin liquid  8. Physical deconditioning/debility--likely secondary to #1, #3 and #4; PT/OT, Pt was very weak after CT scan. 9. Obesity with BMI 33.45    Dispo; Awaiting safe DC plan; IPR declined. Chief Complaint: COVID-19    Initial H and P:-     Per H&P dated 12/18: Felix Sierra is a 73 y.o. female with PMHx of CAD, essential hypertension, type II diabetes, who presents to [facility] with shortness of breath. Patient states she began having symptoms on 12/12 of loss of appetite, changes in taste and smell, food aversions, nausea with dry heaves and diarrhea.   she originally attributed these symptoms to changes in her diabetic medications.    As symptoms persisted, patient states she was advised by her PCP to go to ED at [facility] for workup and found to be COVID positive with hypoxia 70% spo2. She decided to leave AMA per their recommendation she be admitted to their facility as she preferred to be admitted to [address] [place] [address] for treatment and family close by. She states she has felt okay in spite of measured hypoxia. She denies fevers, chills, chest pain, dizziness, numbness or tingling, abdominal pain      ED course:   Vitals on arrival 98.5F, RR 28, HR 95, /77  Labs: Lactic acid, CBC, ABG, Ferritin, CRP   EKG, CXR, CTA chest\"     12/19--> now on high flow however settings not documented as patient desatted to 83% on 6 L; stop IV fluids     12/20-->on BiPAP now despite being maxed out on high flow and was tachypneic; palliative care saw patient and she does want to be a full code and is okay with intubation     1/4->-patient was intubated on 12/20 and was extubated on 1/2; she was transferred out to stepdown on 1/3; she is hemodynamically stable, she is currently on 3 L of oxygen; speech therapy is planning a modified barium swallow today; patient is fully alert and oriented, in no distress, she is extremely weak and has a very weak voice     1/5--> hemodynamically stable, on 2 L of oxygen satting 94%; patient is alert and oriented however needs frequent redirection with her phone, she is asking for her coat \"to get out of here\"    1/6 --> No acute events overnight. Pt states that her breathing is better today. Pt states that she worked with therapy and did well. She still states that she is extremely weak. 1/7: No acute events overnight. Pt was transferred out of COVID isolation. Awaiting IPR consult. 1/8: Pt is more alert and conversational today. Pt is still very weak. This provider spoke with family about IPR.     1/9:  RN states that patient has been more confused today. Behzad Sarabia. Patient was alert and conversational during my evaluation. Following commands.   Patient still states that she is very weak. And states she has been very tired today. Subjective (past 24 hours):   No acute events overnight. Pt is more awake today. Family states that she has not had any naps today. Pt is still having urinary retention issues. Pt has no abd pain. Pt wants to try to get in the chair today. Past medical history, family history, social history and allergies reviewed again and is unchanged since admission. ROS (All review of systems completed. Pertinent positives noted. Otherwise All other systems reviewed and negative.)     Medications:  Reviewed    Infusion Medications    sodium chloride 25 mL (01/03/22 0227)    dextrose       Scheduled Medications    fluconazole  200 mg Oral Daily    midodrine  5 mg Oral Once    insulin glargine  20 Units SubCUTAneous QAM AC    insulin lispro  0-12 Units SubCUTAneous TID WC    insulin lispro  0-6 Units SubCUTAneous Nightly    famotidine  20 mg Oral BID    furosemide  40 mg Oral Daily    rosuvastatin  10 mg Oral Daily    aspirin  81 mg Per NG tube Daily    enoxaparin  30 mg SubCUTAneous Q12H     PRN Meds: hydrOXYzine, acetaminophen **OR** acetaminophen, bisacodyl, dextrose, sodium chloride flush, sodium chloride, ondansetron **OR** ondansetron, senna, polyethylene glycol, glucose, dextrose, glucagon (rDNA), dextrose, albuterol sulfate HFA      Intake/Output Summary (Last 24 hours) at 1/11/2022 1731  Last data filed at 1/11/2022 0600  Gross per 24 hour   Intake 150 ml   Output 350 ml   Net -200 ml       Diet:  ADULT DIET; Dysphagia - Soft and Bite Sized  ADULT ORAL NUTRITION SUPPLEMENT; Breakfast, Lunch, Dinner; Diabetic Oral Supplement    Exam:  /63   Pulse 87   Temp 97.6 °F (36.4 °C) (Oral)   Resp 16   Ht 5' 2\" (1.575 m)   Wt 190 lb 9.6 oz (86.5 kg)   SpO2 93%   BMI 34.86 kg/m²   General appearance: Chronically ill appearing white female   HEENT: Pupils equal, round, and reactive to light.  Conjunctivae/corneas clear.  Neck: Supple, with full range of motion. No jugular venous distention. Trachea midline. Respiratory:  Normal respiratory effort on RA. Breath sounds diminished. Cardiovascular: Regular rate and rhythm with normal S1/S2 without murmurs, rubs or gallops. Abdomen: Soft, non-tender, non-distended with normal bowel sounds. Musculoskeletal: passive and active ROM x 4 extremities. Skin: Skin color, texture, turgor normal.  No rashes or lesions. Neurologic:  Neurovascularly intact without any focal sensory/motor deficits. Cranial nerves: II-XII intact, grossly non-focal.  Psychiatric: Alert and oriented, mildly confused  Capillary Refill: Brisk,< 3 seconds   Peripheral Pulses: +2 palpable, equal bilaterally     Labs:   Recent Labs     01/10/22  1536   WBC 8.7   HGB 11.8*   HCT 36.0*   *     Recent Labs     01/10/22  1536   *   K 3.6      CO2 23   BUN 10   CREATININE 0.6   CALCIUM 8.0*     No results for input(s): AST, ALT, BILIDIR, BILITOT, ALKPHOS in the last 72 hours. No results for input(s): INR in the last 72 hours. No results for input(s): Ellene Lazier in the last 72 hours. Microbiology:    Blood culture #1:   Lab Results   Component Value Date    BC No growth-preliminary  01/02/2022    BC  01/02/2022     possible contamination; clinical correlation required     BC No growth-preliminary No growth  01/02/2022     Urine culture:   Lab Results   Component Value Date    LABURIN No growth-preliminary No growth  12/20/2021     Urinalysis:      Lab Results   Component Value Date    NITRU NEGATIVE 01/10/2022    WBCUA 15-25 01/10/2022    BACTERIA NONE 01/10/2022    RBCUA > 200 01/10/2022    BLOODU LARGE 01/10/2022    SPECGRAV 1.026 12/31/2021    GLUCOSEU NEGATIVE 01/10/2022       Radiology:  CT ABDOMEN PELVIS W IV CONTRAST Additional Contrast? Oral   Final Result   1.  There is a segment of colonic wall thickening involving the sigmoid colon as well as the descending colon with pericolonic strandy opacity and diverticular disease along the sigmoid colon (most consistent with diverticulitis. There is a small amount    of gas within the urinary bladder empty dependently on axial image 70 which is nonspecific but could be related to recent catheterization. Correlate clinically. There is no gross evidence of oral contrast within the urinary bladder on the current    examination. 2. Limited evaluation of the lung bases demonstrates mild dependent bibasilar opacities which may represent mild atelectasis or pneumonia. Small bilateral pleural effusions are seen. 3. There is a trace amount of ascitic fluid in the perihepatic and perisplenic regions   No acute osseous findings are seen. Lower lumbar facet arthrosis is demonstrated. **This report has been created using voice recognition software. It may contain minor errors which are inherent in voice recognition technology. **      Final report electronically signed by Dr. Mau Davis on 1/10/2022 12:13 PM      FL MODIFIED BARIUM SWALLOW W VIDEO   Final Result   Laryngeal penetration without aspiration with thin liquids. Recommendations and comments available from speech therapy            **This report has been created using voice recognition software. It may contain minor errors which are inherent in voice recognition technology. **      Final report electronically signed by Dr. Mica Moseley on 1/4/2022 11:34 AM      XR CHEST PORTABLE   Final Result   Stable mild bilateral patchy pulmonary opacities. **This report has been created using voice recognition software. It may contain minor errors which are inherent in voice recognition technology. **      Final report electronically signed by Dr. Colletta Chamber on 1/1/2022 7:25 AM      VL DUP LOWER EXTREMITY ARTERIES LEFT   Final Result   No significant stenosis or occlusion. **This report has been created using voice recognition software.   It may contain minor errors which are inherent in voice recognition technology. **      Final report electronically signed by Dr. Dino Garcia on 12/30/2021 3:17 PM      XR CHEST PORTABLE   Final Result   No interval change. This document has been electronically signed by: Deb Arroyo DO on    12/28/2021 02:39 AM      XR CHEST PORTABLE   Final Result   Impression:   Bilateral consolidations. Improving left-sided infiltrates. This document has been electronically signed by: Celine Andrade MD on    12/25/2021 05:09 AM      XR CHEST PORTABLE   Final Result   1. Support lines and tubes as outlined above, ET tube is 4.4 cm superior    to the telma. 2. Diffuse scattered infiltrates and airspace disease throughout the    bilateral lungs. More complex consolidation involving the right lung base. This document has been electronically signed by: Deb Arroyo DO on    12/24/2021 03:04 AM      XR CHEST PORTABLE   Final Result   Interval placement of right-sided PICC line with catheter tip at the cavoatrial junction with stable diffuse patchy lung opacities bilaterally. **This report has been created using voice recognition software. It may contain minor errors which are inherent in voice recognition technology. **      Final report electronically signed by Dr. Sherine White MD on 12/21/2021 7:10 PM      XR CHEST PORTABLE   Final Result   Diffuse patchy lung opacities as evidence for diffuse pneumonia or ARDS following intubation. **This report has been created using voice recognition software. It may contain minor errors which are inherent in voice recognition technology. **      Final report electronically signed by Dr. Sherine White MD on 12/20/2021 7:21 PM      XR CHEST PORTABLE   Final Result   1. Marked worsening of the diffuse airspace opacities relating to multilobar pneumonia. **This report has been created using voice recognition software.   It may contain minor errors which are inherent in voice recognition technology. **      Final report electronically signed by Dr Gera Rodriguez on 12/20/2021 11:36 AM      CTA CHEST W [address]   Final Result      1. No evidence of pulmonary embolism. 2. Areas of abnormal density throughout both lung fields consistent with inflammatory process, presumably Covid 19 infection. 3. Mild cardiomegaly. 4. Mild dilatation of the ascending aorta. 5. Thoracic spondylosis. 6. Hiatal hernia. 7. Hepatosplenomegaly. **This report has been created using voice recognition software. It may contain minor errors which are inherent in voice recognition technology. **      Final report electronically signed by DR Lilian Zuñiga on 12/18/2021 3:02 PM      XR CHEST PORTABLE   Final Result   1. Diffuse groundglass opacification of both lungs with areas of interstitial thickening and nodular consolidative opacities. Findings likely relate to multilobar pneumonia. Pulmonary edema could also give this appearance. **This report has been created using voice recognition software. It may contain minor errors which are inherent in voice recognition technology. **      Final report electronically signed by Dr Gera Rodriguez on 12/18/2021 2:20 PM        Electronically signed by MICHAEL Valentine on 1/11/2022 at 5:31 PM

## 2022-01-12 LAB
ANION GAP SERPL CALCULATED.3IONS-SCNC: 10 MEQ/L (ref 8–16)
BUN BLDV-MCNC: 8 MG/DL (ref 7–22)
CALCIUM SERPL-MCNC: 8.5 MG/DL (ref 8.5–10.5)
CHLORIDE BLD-SCNC: 102 MEQ/L (ref 98–111)
CO2: 23 MEQ/L (ref 23–33)
CREAT SERPL-MCNC: 0.5 MG/DL (ref 0.4–1.2)
ERYTHROCYTE [DISTWIDTH] IN BLOOD BY AUTOMATED COUNT: 14.7 % (ref 11.5–14.5)
ERYTHROCYTE [DISTWIDTH] IN BLOOD BY AUTOMATED COUNT: 46.2 FL (ref 35–45)
GFR SERPL CREATININE-BSD FRML MDRD: > 90 ML/MIN/1.73M2
GLUCOSE BLD-MCNC: 165 MG/DL (ref 70–108)
GLUCOSE BLD-MCNC: 173 MG/DL (ref 70–108)
GLUCOSE BLD-MCNC: 173 MG/DL (ref 70–108)
GLUCOSE BLD-MCNC: 186 MG/DL (ref 70–108)
GLUCOSE BLD-MCNC: 195 MG/DL (ref 70–108)
HCT VFR BLD CALC: 36.9 % (ref 37–47)
HEMOGLOBIN: 12 GM/DL (ref 12–16)
MCH RBC QN AUTO: 30.2 PG (ref 26–33)
MCHC RBC AUTO-ENTMCNC: 32.5 GM/DL (ref 32.2–35.5)
MCV RBC AUTO: 92.7 FL (ref 81–99)
ORGANISM: ABNORMAL
PLATELET # BLD: 112 THOU/MM3 (ref 130–400)
PMV BLD AUTO: 10.2 FL (ref 9.4–12.4)
POTASSIUM REFLEX MAGNESIUM: 4.7 MEQ/L (ref 3.5–5.2)
RBC # BLD: 3.98 MILL/MM3 (ref 4.2–5.4)
SODIUM BLD-SCNC: 135 MEQ/L (ref 135–145)
URINE CULTURE REFLEX: ABNORMAL
URINE CULTURE REFLEX: ABNORMAL
WBC # BLD: 5.6 THOU/MM3 (ref 4.8–10.8)

## 2022-01-12 PROCEDURE — 6370000000 HC RX 637 (ALT 250 FOR IP): Performed by: NURSE PRACTITIONER

## 2022-01-12 PROCEDURE — 99232 SBSQ HOSP IP/OBS MODERATE 35: CPT | Performed by: PHYSICIAN ASSISTANT

## 2022-01-12 PROCEDURE — 6370000000 HC RX 637 (ALT 250 FOR IP): Performed by: PHYSICIAN ASSISTANT

## 2022-01-12 PROCEDURE — 97530 THERAPEUTIC ACTIVITIES: CPT

## 2022-01-12 PROCEDURE — 85027 COMPLETE CBC AUTOMATED: CPT

## 2022-01-12 PROCEDURE — 1200000000 HC SEMI PRIVATE

## 2022-01-12 PROCEDURE — 97110 THERAPEUTIC EXERCISES: CPT

## 2022-01-12 PROCEDURE — 36415 COLL VENOUS BLD VENIPUNCTURE: CPT

## 2022-01-12 PROCEDURE — 82948 REAGENT STRIP/BLOOD GLUCOSE: CPT

## 2022-01-12 PROCEDURE — 6360000002 HC RX W HCPCS: Performed by: PHYSICIAN ASSISTANT

## 2022-01-12 PROCEDURE — 80048 BASIC METABOLIC PNL TOTAL CA: CPT

## 2022-01-12 PROCEDURE — 92526 ORAL FUNCTION THERAPY: CPT

## 2022-01-12 RX ORDER — FLUCONAZOLE 200 MG/1
200 TABLET ORAL DAILY
Qty: 7 TABLET | Refills: 0 | Status: ON HOLD | DISCHARGE
Start: 2022-01-13 | End: 2022-01-14

## 2022-01-12 RX ORDER — HYDROXYZINE PAMOATE 25 MG/1
25 CAPSULE ORAL 4 TIMES DAILY PRN
Status: ON HOLD | DISCHARGE
Start: 2022-01-12 | End: 2022-01-14

## 2022-01-12 RX ADMIN — FAMOTIDINE 20 MG: 20 TABLET ORAL at 21:13

## 2022-01-12 RX ADMIN — ROSUVASTATIN 10 MG: 10 TABLET, FILM COATED ORAL at 21:12

## 2022-01-12 RX ADMIN — INSULIN LISPRO 2 UNITS: 100 INJECTION, SOLUTION INTRAVENOUS; SUBCUTANEOUS at 12:12

## 2022-01-12 RX ADMIN — ENOXAPARIN SODIUM 30 MG: 100 INJECTION SUBCUTANEOUS at 00:59

## 2022-01-12 RX ADMIN — INSULIN LISPRO 2 UNITS: 100 INJECTION, SOLUTION INTRAVENOUS; SUBCUTANEOUS at 18:07

## 2022-01-12 RX ADMIN — ENOXAPARIN SODIUM 30 MG: 100 INJECTION SUBCUTANEOUS at 12:11

## 2022-01-12 RX ADMIN — ACETAMINOPHEN 650 MG: 325 TABLET ORAL at 10:53

## 2022-01-12 RX ADMIN — FAMOTIDINE 20 MG: 20 TABLET ORAL at 09:34

## 2022-01-12 RX ADMIN — INSULIN LISPRO 1 UNITS: 100 INJECTION, SOLUTION INTRAVENOUS; SUBCUTANEOUS at 21:30

## 2022-01-12 RX ADMIN — INSULIN LISPRO 2 UNITS: 100 INJECTION, SOLUTION INTRAVENOUS; SUBCUTANEOUS at 09:34

## 2022-01-12 RX ADMIN — FLUCONAZOLE 200 MG: 200 TABLET ORAL at 09:34

## 2022-01-12 RX ADMIN — FUROSEMIDE 40 MG: 40 TABLET ORAL at 09:34

## 2022-01-12 RX ADMIN — INSULIN GLARGINE 20 UNITS: 100 INJECTION, SOLUTION SUBCUTANEOUS at 12:17

## 2022-01-12 RX ADMIN — ASPIRIN 81 MG CHEWABLE TABLET 81 MG: 81 TABLET CHEWABLE at 09:34

## 2022-01-12 ASSESSMENT — PAIN SCALES - GENERAL
PAINLEVEL_OUTOF10: 2
PAINLEVEL_OUTOF10: 0
PAINLEVEL_OUTOF10: 8

## 2022-01-12 NOTE — PROGRESS NOTES
[facility]  Occupational Therapy  Daily Note  Time:    Time In: 1405  Time Out: 1435  Timed Code Treatment Minutes: 30 Minutes  Minutes: 30          Date: 2022  Patient Name: Sisi Yeboah,   Gender: female      Room: [facility]  MRN: [id number]  : 1950  (73 y.o.)  Referring Practitioner: WYATT Arredondo CNP  Diagnosis: hypoxia  Additional Pertinent Hx: Samaria Esquivel is a 79-year-old white female who presented to [facility] on 2021 with complaints of shortness of breath she has a past medical history lifetime non-smoker, CAD with stent placement in , hypertension, diabetes mellitus, dyslipidemia. Per report she presented to [facility] on  with complaints of hypoxia and shortness of breath. She was initially placed on 4 L nasal cannula.  patient had persistent hypoxia and was transitioned to high flow nasal cannula. On  patient was transitioned to BiPAP and transferred to [facility] ICU for intubation. Pt extubated . Restrictions/Precautions:  Restrictions/Precautions: General Precautions,Fall Risk  Position Activity Restriction  Other position/activity restrictions: out of COVID isolation 22, monitor BP      SUBJECTIVE: Alert, talkative throughout, tangiential , perseverative at times on wanting fabric spray/ glue to work on a project. Did follow short commands with encouragement and tactile cueing at times. PAIN: 0/10: denied pain    Vitals: Vitals not assessed per clinical judgement, see nursing flowsheet    COGNITION: Decreased Recall, Decreased Insight, Impaired Memory, Inattention, Decreased Problem Solving, Decreased Safety Awareness and Tangential    ADL:   Lower Extremity Dressing: Dependent. to adjust socks when sitting. Paco Settler BALANCE:  Sitting Balance:  Stand By Assistance, [employer]. CGA to SBA when sitting EOC and EOB. Pt tends to lean posterior.  cues for sustainign upright posture. Standing Balance: Minimal Assistance, Moderate Assistance. min to mod A x 2 persons in standing within the jose stedy lift aide. Pt required moderate tactile and verbal cueing forupright posture as well as pushing self to stand. Trial 1 x 30 sec,  trial 2 standing at bed x 15-20 seconds. Pt then sitting quickly due to fatigue. BED MOBILITY:  Sit to Supine: Minimal Assistance, Moderate Assistance min A for trunk, mod A for LEs. mod A for repositioning in the bed for comfort. TRANSFERS:  Sit to Stand: Moderate Assistance. x 2 from the recliner. min A x 2 from the elevated surface, jose stedy lift aid. Stand to Sit: Minimal Assistance. x 2 to guide to sit    FUNCTIONAL MOBILITY:  Assistive Device: jose stedy lift aide for safety. Assist Level:  Dependent. Distance: from recliner to EOB. CGA for trunk balance  and encouraging upright posture. ADDITIONAL ACTIVITIES:  B UE AAROM EOB sh flexion, horiz ABD x 10 reps. Mod encouragement to attempt on own. ASSESSMENT:     Activity Tolerance:  Patient tolerance of  treatment: fair. Discharge Recommendations: ECF with OT  Equipment Recommendations: Equipment Needed:  (defer to next level of care)  Plan: Times per week: 5x  Times per day: Daily  Current Treatment Recommendations: Strengthening,Balance Training,Functional Mobility Training,Endurance Training,Home Management Training,Patient/Caregiver Education & Training,Self-Care / ADL,Safety Education & Training    Patient Education  Patient Education: Home Exercise Program, precautions.     Goals  Short term goals  Time Frame for Short term goals: by discharge  Short term goal 1: Pt will tolerate sitting on EOB >10 min with no > than Min A x1 and min VC while maintaining O2 >90% for increased indep with ADLs  Short term goal 2: Pt will complete EOB ADLs with Mod A for AAROM and min VC for increased indep with grooming  Short term goal 3: Pt will complete BUE AROM/AAROM with no > than Min A and min VC to increase UB strength and endurance to increase indep with ADLs  Short term goal 4: Pt will tolerate OTR to assess t/fs and mobility when appropriate GOAL MET REVISE GOAL Pt will complete sit to stand transfers with no > min A x 2 persons consistenlty to increase safe toilet transfers. STG x 5 Pt will tolerate standing > 45 seconds with CGA x 2 persons to increase endurance for toileting routine   Following session, patient left in safe position with all fall risk precautions in place.

## 2022-01-12 NOTE — PROGRESS NOTES
[facility]  INPATIENT PHYSICAL THERAPY  DAILY NOTE  STRZ SURGE OVERFLOW - 8E-71/8E-71A    Time In: 2860  Time Out: 4492  Timed Code Treatment Minutes: 32 Minutes  Minutes: 32      Co-tx with OT due to medical complexity and decreased activity tolerance. Pt requiring +2 skilled assist.    Date: 2022  Patient Name: Merlin Barrs,  Gender:  female        MRN: [id number]  : 1950  (73 y.o.)     Referring Practitioner: Kimberly Martinez CNP  Diagnosis: Hypoxia  Additional Pertinent Hx: Stephanie Lambert is a 24-year-old white female who presented to [facility] on 2021 with complaints of shortness of breath she has a past medical history lifetime non-smoker, CAD with stent placement in , hypertension, diabetes mellitus, dyslipidemia. Per report she presented to [facility] on  with complaints of hypoxia and shortness of breath. She was initially placed on 4 L nasal cannula.  patient had persistent hypoxia and was transitioned to high flow nasal cannula. On  patient was transitioned to BiPAP and transferred to [facility] ICU for intubation. Pt extubated 1. Prior Level of Function:  Lives With: Spouse  Type of Home: House  Home Layout: One level  Home Equipment:  (None)        ADL Assistance: Independent  Homemaking Assistance: Independent  Ambulation Assistance: Independent  Transfer Assistance: Independent  Active : Yes  Additional Comments: Pt fully I prior to admission, 6 kids    Restrictions/Precautions:  Restrictions/Precautions: General Precautions,Fall Risk  Position Activity Restriction  Other position/activity restrictions: out of COVID isolation 22, monitor BP     SUBJECTIVE: RN approved session. Pt in recliner upon arrival and agrees to therapy. Pt more alert and very talkative however confused and anxious with mobility. Pt disoriented as well.     PAIN: buttocks, did not quantify    Vitals: Vitals not assessed per clinical judgement, see nursing flowsheet    OBJECTIVE:  Bed Mobility:  Sit to Supine: Minimal Assistance, Moderate Assistance, X 2, with head of bed raised, with rail, with verbal cues , with increased time for completion   Scooting: Dependent, to boost to [facility], mod/min X2 to scoot retro in seated position    Transfers: *assisted OT with transfer, will have supervising PT assess at next available date*  Sit to Stand: Moderate Assistance. x 2 from the recliner. min A x 2 from the elevated surface, using JEANNETTE steady  Stand to Sit: Minimal Assistance. x 2 to guide to sit with JEANNETTE steady  To/From Bed and Chair: Dependent, X 2, with Victoria Man  Pt was anxious with tfer cued pt for relaxation and deep breathing throughout    Balance:  Static Sitting Balance:  Stand By Assistance, Contact Guard Assistance  Dynamic Sitting Balance: Stand By Assistance, Contact Guard Assistance  Pt leans to L slightly when seated on JEANNETTE steady seat- cues to sit in midline, pt sat EOB ~10 mins while completing therex with this PTA and with OT. Grossly steady no LOB  Pt also statically stood in JEANNETTE steady x30\" x1 and 20\" x1 with cues for posture and CGA/min x2    Exercise:  Patient was guided in 1 set(s) 10 reps of exercise to both lower extremities. Seated marches, Seated heel/toe raises and Long arc quads. Exercises were completed for increased independence with functional mobility. Functional Outcome Measures: Completed  AM-PAC Inpatient Mobility Raw Score : 7  AM-PAC Inpatient T-Scale Score : 26.42    ASSESSMENT:  Assessment: Patient progressing toward established goals. Activity Tolerance:  Patient tolerance of  treatment: good. Pt limited by fatigue and weakness. Pt anxious and confused. Pt will require cont PT at this time to improve overall function and independence with mobility. Equipment Recommendations: Other: will monitor for needs pending progress and DC destination  Discharge Recommendations: Inpatient Therapy Stay  Plan: Times per week: 5x GM  Current Treatment Recommendations: Strengthening,Positioning,Patient/Caregiver Education & Training,Safety Education & Training,Balance Training,Endurance Training,Functional Mobility Training,Neuromuscular Re-education    Patient Education  Patient Education: Plan of Care, Bed Mobility, Transfers, Verbal Exercise Instruction, Pursed Lip Breathing, relaxation    Goals:  Patient goals : to get stronger  Short term goals  Time Frame for Short term goals: by discharge  Short term goal 1: Pt to roll L and R In bed with mod A to improve bed mobility. Short term goal 2: Pt to transfer supine <--> sit min A to enable pt to get in/out of bed. Short term goal 3: Pt to sit EOB with SBA x 10 minutes for improved upright tolerance in prep for transfers/gait. Short term goal 4: PT to assess transfers/gait. Long term goals  Time Frame for Long term goals : NA due to short length of stay. Following session, patient left in safe position with all fall risk precautions in place.

## 2022-01-12 NOTE — PROGRESS NOTES
[facility] as patients spouse requested to see about admission. SW talked to [facility] and she did not think they had any open beds but asked SERVANDO to call back later today or tomorrow.

## 2022-01-12 NOTE — PROGRESS NOTES
Hospitalist Progress Note      Patient:  David Turk    Unit/Bed:8E-71/8E-71A  YOB: 1950  MRN: [id number]   Acct: [de-identified]   PCP: Bar Millan  Date of Admission: 12/18/2021    Assessment/Plan:    1. COVID 19: Patient is now out of isolation as of 1/6. Pt has completed Baricitnib & had toculizamb on 1/2 due to continued fevers. Pt had high dose Decadron. 2. Acute respiratory failure, hypoxia: Patient's baseline is room air. Patient is on RA. Pt was intubated on 12/20 and extubated on 1/2. Incentive spirometry. Acapella. CXR (1/1) shows stable bilateral opacities. 3. Possible Fistula: RN stated that she thought stool was in patient's vagina. CT A/P with oral contrast showed no fistula. One dose of Fosfomycin given as prophylaxis. 4. ARDS--patient was proned  5. Bacterial pneumonia with Klebsiella oxytocin--Rocephin 12/24-1/4  6. Diabetes mellitus type 2, uncontrolled--since patient is eating well we will continue Lantus 20 units daily, change sliding scale to medium dose before meals and at bedtime  7. Mild oral dysphagia/mild to moderate pharyngeal dysphagia--appreciate speech therapy input, diet is soft and bite-size with thin liquid  8. Physical deconditioning/debility--likely secondary to #1, #3 and #4; PT/OT, Pt up in the chair for the first time and tolerated it well. 9. Obesity with BMI 33.45    Dispo; Awaiting safe DC plan; IPR declined. Chief Complaint: COVID-19    Initial H and P:-     Per H&P dated 12/18: Bela Sierra is a 73 y.o. female with PMHx of CAD, essential hypertension, type II diabetes, who presents to [address] [place] [address] with shortness of breath. Patient states she began having symptoms on 12/12 of loss of appetite, changes in taste and smell, food aversions, nausea with dry heaves and diarrhea.   she originally attributed these symptoms to changes in her diabetic medications.    As symptoms persisted, patient states she was advised by her PCP to go to ED at [facility] for workup and found to be COVID positive with hypoxia 70% spo2. She decided to leave AMA per their recommendation she be admitted to their facility as she preferred to be admitted to [address] [place] [address] for treatment and family close by. She states she has felt okay in spite of measured hypoxia. She denies fevers, chills, chest pain, dizziness, numbness or tingling, abdominal pain      ED course:   Vitals on arrival 98.5F, RR 28, HR 95, /77  Labs: Lactic acid, CBC, ABG, Ferritin, CRP   EKG, CXR, CTA chest\"     12/19--> now on high flow however settings not documented as patient desatted to 83% on 6 L; stop IV fluids     12/20-->on BiPAP now despite being maxed out on high flow and was tachypneic; palliative care saw patient and she does want to be a full code and is okay with intubation     1/4->-patient was intubated on 12/20 and was extubated on 1/2; she was transferred out to stepdown on 1/3; she is hemodynamically stable, she is currently on 3 L of oxygen; speech therapy is planning a modified barium swallow today; patient is fully alert and oriented, in no distress, she is extremely weak and has a very weak voice     1/5--> hemodynamically stable, on 2 L of oxygen satting 94%; patient is alert and oriented however needs frequent redirection with her phone, she is asking for her coat \"to get out of here\"    1/6 --> No acute events overnight. Pt states that her breathing is better today. Pt states that she worked with therapy and did well. She still states that she is extremely weak. 1/7: No acute events overnight. Pt was transferred out of COVID isolation. Awaiting IPR consult. 1/8: Pt is more alert and conversational today. Pt is still very weak. This provider spoke with family about IPR.     1/9:  RN states that patient has been more confused today. Behzad Sarabia. Patient was alert and conversational during my evaluation. Following commands. Patient still states that she is very weak. And states she has been very tired today. 1/11: No acute events overnight. Pt is more awake today. Family states that she has not had any naps today. Pt is still having urinary retention issues. Pt has no abd pain. Pt wants to try to get in the chair today. Subjective (past 24 hours):   No acute events overnight. Patient was able to get in the chair with 2 assist in Darshana Merles steady for the 1st time since admission. Patient was able to go to the restroom for the 1st time since admission. Patient is in good spirits today. Patient is more awake today. Encourage fluids. Past medical history, family history, social history and allergies reviewed again and is unchanged since admission. ROS (All review of systems completed. Pertinent positives noted. Otherwise All other systems reviewed and negative.)     Medications:  Reviewed    Infusion Medications    sodium chloride 25 mL (01/03/22 0226)    dextrose       Scheduled Medications    fluconazole  200 mg Oral Daily    midodrine  5 mg Oral Once    insulin glargine  20 Units SubCUTAneous QAM AC    insulin lispro  0-12 Units SubCUTAneous TID WC    insulin lispro  0-6 Units SubCUTAneous Nightly    famotidine  20 mg Oral BID    furosemide  40 mg Oral Daily    rosuvastatin  10 mg Oral Daily    aspirin  81 mg Per NG tube Daily    enoxaparin  30 mg SubCUTAneous Q12H     PRN Meds: hydrOXYzine, acetaminophen **OR** acetaminophen, bisacodyl, dextrose, sodium chloride flush, sodium chloride, ondansetron **OR** ondansetron, senna, polyethylene glycol, glucose, dextrose, glucagon (rDNA), dextrose, albuterol sulfate HFA      Intake/Output Summary (Last 24 hours) at 1/12/2022 1149  Last data filed at 1/11/2022 1856  Gross per 24 hour   Intake 500 ml   Output 270 ml   Net 230 ml       Diet:  ADULT DIET;  Dysphagia - Soft and Bite Sized  ADULT ORAL NUTRITION SUPPLEMENT; Breakfast, Lunch, Dinner; Diabetic Oral Supplement    Exam:  /63   Pulse 81   Temp 97.7 °F (36.5 °C) (Oral)   Resp 16   Ht 5' 2\" (1.575 m)   Wt 190 lb 9.6 oz (86.5 kg)   SpO2 96%   BMI 34.86 kg/m²   General appearance: Chronically ill appearing white female   HEENT: Pupils equal, round, and reactive to light. Conjunctivae/corneas clear. Neck: Supple, with full range of motion. No jugular venous distention. Trachea midline. Respiratory:  Normal respiratory effort on RA. Breath sounds diminished. Cardiovascular: Regular rate and rhythm with normal S1/S2 without murmurs, rubs or gallops. Abdomen: Soft, non-tender, non-distended with normal bowel sounds. Musculoskeletal: passive and active ROM x 4 extremities. Skin: Skin color, texture, turgor normal.  No rashes or lesions. Neurologic:  Neurovascularly intact without any focal sensory/motor deficits. Cranial nerves: II-XII intact, grossly non-focal.  Psychiatric: Alert and oriented, mildly confused  Capillary Refill: Brisk,< 3 seconds   Peripheral Pulses: +2 palpable, equal bilaterally     Labs:   Recent Labs     01/10/22  1536 01/12/22  0721   WBC 8.7 5.6   HGB 11.8* 12.0   HCT 36.0* 36.9*   * 112*     Recent Labs     01/10/22  1536 01/12/22  0721   * 135   K 3.6 4.7    102   CO2 23 23   BUN 10 8   CREATININE 0.6 0.5   CALCIUM 8.0* 8.5     No results for input(s): AST, ALT, BILIDIR, BILITOT, ALKPHOS in the last 72 hours. No results for input(s): INR in the last 72 hours. No results for input(s): [place] in the last 72 hours.     Microbiology:    Blood culture #1:   Lab Results   Component Value Date    BC No growth-preliminary  01/02/2022    [facility]  01/02/2022     possible contamination; clinical correlation required     BC No growth-preliminary No growth  01/02/2022     Urine culture:   Lab Results   Component Value Date    LABURIN No growth-preliminary No growth  12/20/2021     Urinalysis:      Lab Results   Component Value Date    NITRU NEGATIVE 01/10/2022    WBCUA 15-25 01/10/2022    BACTERIA NONE 01/10/2022    RBCUA > 200 01/10/2022    BLOODU LARGE 01/10/2022    SPECGRAV 1.026 12/31/2021    GLUCOSEU NEGATIVE 01/10/2022       Radiology:  CT ABDOMEN PELVIS W IV CONTRAST Additional Contrast? Oral   Final Result   1. There is a segment of colonic wall thickening involving the sigmoid colon as well as the descending colon with pericolonic strandy opacity and diverticular disease along the sigmoid colon (most consistent with diverticulitis. There is a small amount    of gas within the urinary bladder empty dependently on axial image 70 which is nonspecific but could be related to recent catheterization. Correlate clinically. There is no gross evidence of oral contrast within the urinary bladder on the current    examination. 2. Limited evaluation of the lung bases demonstrates mild dependent bibasilar opacities which may represent mild atelectasis or pneumonia. Small bilateral pleural effusions are seen. 3. There is a trace amount of ascitic fluid in the perihepatic and perisplenic regions   No acute osseous findings are seen. Lower lumbar facet arthrosis is demonstrated. **This report has been created using voice recognition software. It may contain minor errors which are inherent in voice recognition technology. **      Final report electronically signed by Dr. Isabella Borges on 1/10/2022 12:13 PM      FL MODIFIED BARIUM SWALLOW W VIDEO   Final Result   Laryngeal penetration without aspiration with thin liquids. Recommendations and comments available from speech therapy            **This report has been created using voice recognition software. It may contain minor errors which are inherent in voice recognition technology. **      Final report electronically signed by Dr. Shara Last on 1/4/2022 11:34 AM      XR CHEST PORTABLE   Final Result   Stable mild bilateral patchy pulmonary opacities.                **This report has been created using voice recognition software. It may contain minor errors which are inherent in voice recognition technology. **      Final report electronically signed by Dr. Christine Godwin on 1/1/2022 7:25 AM      VL DUP LOWER EXTREMITY ARTERIES LEFT   Final Result   No significant stenosis or occlusion. **This report has been created using voice recognition software. It may contain minor errors which are inherent in voice recognition technology. **      Final report electronically signed by Dr. Milena Thakkar on 12/30/2021 3:17 PM      XR CHEST PORTABLE   Final Result   No interval change. This document has been electronically signed by: Bigg Hopkins DO on    12/28/2021 02:39 AM      XR CHEST PORTABLE   Final Result   Impression:   Bilateral consolidations. Improving left-sided infiltrates. This document has been electronically signed by: Isaak Chairez MD on    12/25/2021 05:09 AM      XR CHEST PORTABLE   Final Result   1. Support lines and tubes as outlined above, ET tube is 4.4 cm superior    to the telma. 2. Diffuse scattered infiltrates and airspace disease throughout the    bilateral lungs. More complex consolidation involving the right lung base. This document has been electronically signed by: Bigg Hopkins DO on    12/24/2021 03:04 AM      XR CHEST PORTABLE   Final Result   Interval placement of right-sided PICC line with catheter tip at the cavoatrial junction with stable diffuse patchy lung opacities bilaterally. **This report has been created using voice recognition software. It may contain minor errors which are inherent in voice recognition technology. **      Final report electronically signed by Dr. Dina Khalil MD on 12/21/2021 7:10 PM      XR CHEST PORTABLE   Final Result   Diffuse patchy lung opacities as evidence for diffuse pneumonia or ARDS following intubation. **This report has been created using voice recognition software.  It may contain minor errors which are inherent in voice recognition technology. **      Final report electronically signed by Dr. Raul Vogt MD on 12/20/2021 7:21 PM      XR CHEST PORTABLE   Final Result   1. Marked worsening of the diffuse airspace opacities relating to multilobar pneumonia. **This report has been created using voice recognition software. It may contain minor errors which are inherent in voice recognition technology. **      Final report electronically signed by Dr Dimple Welch on 12/20/2021 11:36 AM      CTA CHEST [address]   Final Result      1. No evidence of pulmonary embolism. 2. Areas of abnormal density throughout both lung fields consistent with inflammatory process, presumably Covid 19 infection. 3. Mild cardiomegaly. 4. Mild dilatation of the ascending aorta. 5. Thoracic spondylosis. 6. Hiatal hernia. 7. Hepatosplenomegaly. **This report has been created using voice recognition software. It may contain minor errors which are inherent in voice recognition technology. **      Final report electronically signed by DR Rika Quintanilla on 12/18/2021 3:02 PM      XR CHEST PORTABLE   Final Result   1. Diffuse groundglass opacification of both lungs with areas of interstitial thickening and nodular consolidative opacities. Findings likely relate to multilobar pneumonia. Pulmonary edema could also give this appearance. **This report has been created using voice recognition software. It may contain minor errors which are inherent in voice recognition technology. **      Final report electronically signed by Dr Dimple Welch on 12/18/2021 2:20 PM        Electronically signed by MICHAEL Grimm on 1/12/2022 at 11:49 AM

## 2022-01-12 NOTE — PROGRESS NOTES
SERVANDO called [facility] and gave Dye the referral information and he is going to give it to Deonte Rivera when she gets in.

## 2022-01-12 NOTE — PROGRESS NOTES
SERVANDO called Kimberly from [facility] to check on referral;  Kimberly did not answer so a message with a call back number were left.

## 2022-01-12 NOTE — DISCHARGE INSTR - COC
Continuity of Care Form    Patient Name: Klaudia Wade   :  1950  MRN:  [id number]    Admit date:  2021  Discharge date:  ***    Code Status Order: Full Code   Advance Directives:      Admitting Physician:  Darius Renner MD  PCP: Zohra Rae    Discharging Nurse: [facility] Unit/Room#: 8E-71/8E-71A  Discharging Unit Phone Number: ***    Emergency Contact:   Extended Emergency Contact Information  Primary Emergency Contact: Malcom Bee  Mobile Phone: [phone]  Relation: Child  Secondary Emergency Contact: Otilia Lowe  Mobile Phone: [phone]  Relation: Spouse    Past Surgical History:  No past surgical history on file. Immunization History: There is no immunization history on file for this patient.     Active Problems:  Patient Active Problem List   Diagnosis Code    COVID-19 U07.1    Hypoxia R09.02    Acute respiratory failure with hypoxia (HCC) J96.01       Isolation/Infection:   Isolation            No Isolation          Patient Infection Status       Infection Onset Added Last Indicated Last Indicated By Review Planned Expiration Resolved Resolved By    None active    Resolved    COVID-19 21 COVID-19   22 Pola Alfonso RN    +, admit  - 80%            Nurse Assessment:  Last Vital Signs: /63   Pulse 81   Temp 97.7 °F (36.5 °C) (Oral)   Resp 16   Ht 5' 2\" (1.575 m)   Wt 190 lb 9.6 oz (86.5 kg)   SpO2 96%   BMI 34.86 kg/m²     Last documented pain score (0-10 scale): Pain Level: 8  Last Weight:   Wt Readings from Last 1 Encounters:   01/10/22 190 lb 9.6 oz (86.5 kg)     Mental Status:  {IP PT MENTAL STATUS:}    IV Access:  { REGGIE IV ACCESS:356609087}    Nursing Mobility/ADLs:  Walking   {CHP DME IWSP:435863547}  Transfer  {CHP DME IUPW:198590617}  Bathing  {CHP DME BASL:479870076}  Dressing  {CHP DME MZAS:100484681}  Toileting  {CHP DME IYDJ:607544899}  Feeding  {CHP DME ACWZ:829942000}  Med Admin {P DME NTSN:005904831}  Med Delivery   { REGGIE MED Delivery:366982699}    Wound Care Documentation and Therapy:  Wound 21 stage 1 in the coccyx (Active)   Wound Etiology Pressure Stage  1 22 1300   Dressing Status Other (Comment) 22 1300   Wound Cleansed Soap and water 22 0900   Dressing/Treatment Protective barrier 22 0839   Wound Assessment Non-blanchable erythema 22 1300   Drainage Amount None 22 0900   Odor None 22 0900   Francie-wound Assessment Blanchable erythema 22 0900   Number of days: 12        Elimination:  Continence: Bowel: {YES / PF:28427}  Bladder: {YES / V}  Urinary Catheter: {Urinary Catheter:839278257}   Colostomy/Ileostomy/Ileal Conduit: {YES / CK:33975}       Date of Last BM: ***    Intake/Output Summary (Last 24 hours) at 2022 1134  Last data filed at 2022 1856  Gross per 24 hour   Intake 500 ml   Output 270 ml   Net 230 ml     I/O last 3 completed shifts:   In: 0 [P.O.:650]  Out: 620 [Urine:620]    Safety Concerns:     [employer] Safety Concerns:089096247}    Impairments/Disabilities:      [employer] Impairments/Disabilities:204968676}    Nutrition Therapy:  Current Nutrition Therapy:   [employer] Diet List:695078680}    Routes of Feeding: {[facility] DME Other Feedings:426630692}  Liquids: {Slp liquid thickness:50486}  Daily Fluid Restriction: {P DME Yes amt example:729927019}  Last Modified Barium Swallow with Video (Video Swallowing Test): {Done Not Done GFFX:594233459}    Treatments at the Time of Hospital Discharge:   Respiratory Treatments: ***  Oxygen Therapy:  {Therapy; copd oxygen:30298}  Ventilator:    { CC Vent KJRB:063183684}    Rehab Therapies: {THERAPEUTIC INTERVENTION:3741089346}  Weight Bearing Status/Restrictions: [facility] Weight Bearin}  Other Medical Equipment (for information only, NOT a DME order):  {EQUIPMENT:126339830}  Other Treatments: ***    Patient's personal belongings (please select all that are sent with patient):  {TASHI DME Belongings:448764724}    RN SIGNATURE:  {Esignature:763331362}    CASE MANAGEMENT/SOCIAL WORK SECTION    Inpatient Status Date: ***    Readmission Risk Assessment Score:  Readmission Risk              Risk of Unplanned Readmission:  17           Discharging to Facility/ Agency   Name:   Address:  Phone:  Fax:    Dialysis Facility (if applicable)   Name:  Address:  Dialysis Schedule:  Phone:  Fax:    / signature: {Esignature:907317926}    PHYSICIAN SECTION    Prognosis: Fair    Condition at Discharge: Stable    Rehab Potential (if transferring to Rehab): Fair    Recommended Labs or Other Treatments After Discharge: None at this time     Physician Certification: I certify the above information and transfer of Juana Mallory  is necessary for the continuing treatment of the diagnosis listed and that she requires [place] for less 30 days. Update Admission H&P: Changes in H&P as follows - Pt was intubated due to COVID-19. Pt is now presently out of isolation.      PHYSICIAN SIGNATURE:  Electronically signed by MICHAEL Juarez on 1/12/22 at 11:34 AM EST

## 2022-01-12 NOTE — PROGRESS NOTES
strategies with no overt s/s of aspiration and adequate endurance to assist with meeting nutrition/hydration needs. INTERVENTIONS: Patient with adequate pinkish hue and observed good oral hygiene upon ST arrival. Completed skilled dietary analysis this date of mashed potatoes, hamburger, mac and cheese and green beans. Patient demonstrated mildly reduced textural breakdown with solids this date in which independent liquid wash was effective in clearing lingual stasis. Patient required minimal cues throughout to complete additional swallow. Timely AP transit and swallow initiation observed. SUsepct inadequate airway protection as patient demonstrated x2 throat clears during meal. Patient cued to cough and re-swallow with good success. Cannot r/o pharyngeal phase dysfunctions without formal imaging. MBS not warranted at this time. Recommend continuation of soft and bite size diet with thin liquids without overt s/s of aspiration to assist in nutrition/hydration measures. SHORT TERM GOAL #2:  Goal 2: Patient will consume advanced PO trials with ST while demonstrating timely/coordinated mastication, complete bolus clearance, no overt s/s of aspiration, and adeqaute endurance for potential diet advancement to least restrictive diet  INTERVENTIONS:DNT d/t focus on other goals    SHORT TERM GOAL #3:  Goal 3: Patient will complete pharyngeal strengthening exercises x10-15 (effortful swallow, chris) to improve overall timeliness of swallow, airway protection, and decrease pharyngeal residue. INTERVENTIONS: Not addressed specifically this date due to focus on additional goals. SHORT TERM GOAL #4:  Goal 4: Patient will complete oejuhh-lwdxomkw-yubwwxpkc evaluation to further assess current cognitive-linguistic skills and update POC as clinically indicated. INTERVENTIONS: Not addressed specifically this date due to focus on additional goals. ST will f/u in the next session to complete as clinically indicated. Long-Term Goals:   No LTG's established at this time d/t short ELOS. EDUCATION:  Learner: Patient  Education:  Reviewed diet and strategies, Reviewed signs, symptoms and risks of aspiration, Reviewed ST goals and Plan of Care and Reviewed recommendations for follow-up  Evaluation of Education: Verbalizes understanding, Needs further instruction, No evidence of learning and Family not present    ASSESSMENT/PLAN:  Activity Tolerance:  Patient tolerance of  treatment: good. Assessment/Plan: Patient progressing toward established goals. Continues to require skilled care of licensed speech pathologist to progress toward achievement of established goals and plan of care. .     Plan for Next Session: speech-language cognitive assessment     [facility] M[facility] CF-SLP, [id number]

## 2022-01-13 LAB
GLUCOSE BLD-MCNC: 156 MG/DL (ref 70–108)
GLUCOSE BLD-MCNC: 171 MG/DL (ref 70–108)
GLUCOSE BLD-MCNC: 190 MG/DL (ref 70–108)
GLUCOSE BLD-MCNC: 223 MG/DL (ref 70–108)

## 2022-01-13 PROCEDURE — 6370000000 HC RX 637 (ALT 250 FOR IP): Performed by: NURSE PRACTITIONER

## 2022-01-13 PROCEDURE — 97530 THERAPEUTIC ACTIVITIES: CPT

## 2022-01-13 PROCEDURE — 1200000000 HC SEMI PRIVATE

## 2022-01-13 PROCEDURE — 6370000000 HC RX 637 (ALT 250 FOR IP): Performed by: INTERNAL MEDICINE

## 2022-01-13 PROCEDURE — 6370000000 HC RX 637 (ALT 250 FOR IP): Performed by: PHYSICIAN ASSISTANT

## 2022-01-13 PROCEDURE — 99232 SBSQ HOSP IP/OBS MODERATE 35: CPT | Performed by: PHYSICIAN ASSISTANT

## 2022-01-13 PROCEDURE — 97110 THERAPEUTIC EXERCISES: CPT

## 2022-01-13 PROCEDURE — 82948 REAGENT STRIP/BLOOD GLUCOSE: CPT

## 2022-01-13 PROCEDURE — 6360000002 HC RX W HCPCS: Performed by: PHYSICIAN ASSISTANT

## 2022-01-13 RX ORDER — CALCIUM POLYCARBOPHIL 625 MG 625 MG/1
625 TABLET ORAL DAILY
Status: DISCONTINUED | OUTPATIENT
Start: 2022-01-13 | End: 2022-01-14 | Stop reason: HOSPADM

## 2022-01-13 RX ADMIN — ROSUVASTATIN 10 MG: 10 TABLET, FILM COATED ORAL at 22:15

## 2022-01-13 RX ADMIN — INSULIN LISPRO 2 UNITS: 100 INJECTION, SOLUTION INTRAVENOUS; SUBCUTANEOUS at 22:40

## 2022-01-13 RX ADMIN — FUROSEMIDE 40 MG: 40 TABLET ORAL at 09:06

## 2022-01-13 RX ADMIN — FAMOTIDINE 20 MG: 20 TABLET ORAL at 22:15

## 2022-01-13 RX ADMIN — ASPIRIN 81 MG CHEWABLE TABLET 81 MG: 81 TABLET CHEWABLE at 09:06

## 2022-01-13 RX ADMIN — FAMOTIDINE 20 MG: 20 TABLET ORAL at 09:06

## 2022-01-13 RX ADMIN — INSULIN LISPRO 2 UNITS: 100 INJECTION, SOLUTION INTRAVENOUS; SUBCUTANEOUS at 17:03

## 2022-01-13 RX ADMIN — INSULIN LISPRO 2 UNITS: 100 INJECTION, SOLUTION INTRAVENOUS; SUBCUTANEOUS at 12:50

## 2022-01-13 RX ADMIN — INSULIN GLARGINE 20 UNITS: 100 INJECTION, SOLUTION SUBCUTANEOUS at 09:50

## 2022-01-13 RX ADMIN — ENOXAPARIN SODIUM 30 MG: 100 INJECTION SUBCUTANEOUS at 22:15

## 2022-01-13 RX ADMIN — BISACODYL 10 MG: 10 SUPPOSITORY RECTAL at 12:46

## 2022-01-13 RX ADMIN — ACETAMINOPHEN 650 MG: 325 TABLET ORAL at 18:02

## 2022-01-13 RX ADMIN — ACETAMINOPHEN 650 MG: 325 TABLET ORAL at 12:55

## 2022-01-13 RX ADMIN — ENOXAPARIN SODIUM 30 MG: 100 INJECTION SUBCUTANEOUS at 00:43

## 2022-01-13 RX ADMIN — FLUCONAZOLE 200 MG: 200 TABLET ORAL at 09:06

## 2022-01-13 RX ADMIN — INSULIN LISPRO 2 UNITS: 100 INJECTION, SOLUTION INTRAVENOUS; SUBCUTANEOUS at 09:50

## 2022-01-13 ASSESSMENT — PAIN SCALES - GENERAL
PAINLEVEL_OUTOF10: 3
PAINLEVEL_OUTOF10: 7
PAINLEVEL_OUTOF10: 7

## 2022-01-13 NOTE — PROGRESS NOTES
Hospitalist Progress Note      Patient:  Jono Bosch    Unit/Bed:8E-71/8E-71A  YOB: 1950  MRN: [id number]   Acct: [de-identified]   PCP: Lidia Medeiros  Date of Admission: 12/18/2021    Assessment/Plan:    1. COVID 19: Patient is now out of isolation as of 1/6. Pt has completed Baricitnib & had toculizamb on 1/2 due to continued fevers. Pt had high dose Decadron. 2. Acute respiratory failure, hypoxia: Patient's baseline is room air. Patient is on RA. Pt was intubated on 12/20 and extubated on 1/2. Incentive spirometry. Acapella. CXR (1/1) shows stable bilateral opacities. 3. Possible Fistula: RN stated that she thought stool was in patient's vagina. CT A/P with oral contrast showed no fistula. One dose of Fosfomycin given as prophylaxis. 4. ARDS--patient was proned  5. Bacterial pneumonia with Klebsiella oxytocin--Rocephin 12/24-1/4  6. Diabetes mellitus type 2, uncontrolled--since patient is eating well we will continue Lantus 20 units daily, change sliding scale to medium dose before meals and at bedtime  7. Mild oral dysphagia/mild to moderate pharyngeal dysphagia--appreciate speech therapy input, diet is soft and bite-size with thin liquid  8. Physical deconditioning/debility--likely secondary to #1, #3 and #4; PT/OT, Pt up in the chair doing well. IPR reassessing. 9. Obesity with BMI 33.45    Dispo; Awaiting safe DC plan; IPR was asked to reassess. Chief Complaint: COVID-19    Initial H and P:-     Per H&P dated 12/18: Felix Sierra is a 73 y.o. female with PMHx of CAD, essential hypertension, type II diabetes, who presents to [facility] with shortness of breath. Patient states she began having symptoms on 12/12 of loss of appetite, changes in taste and smell, food aversions, nausea with dry heaves and diarrhea.   she originally attributed these symptoms to changes in her diabetic medications.    As symptoms persisted, patient states she was advised by her PCP to go to ED at [facility] for workup and found to be COVID positive with hypoxia 70% spo2. She decided to leave AMA per their recommendation she be admitted to their facility as she preferred to be admitted to [address] [place] [address] for treatment and family close by. She states she has felt okay in spite of measured hypoxia. She denies fevers, chills, chest pain, dizziness, numbness or tingling, abdominal pain      ED course:   Vitals on arrival 98.5F, RR 28, HR 95, /77  Labs: Lactic acid, CBC, ABG, Ferritin, CRP   EKG, CXR, CTA chest\"     12/19--> now on high flow however settings not documented as patient desatted to 83% on 6 L; stop IV fluids     12/20-->on BiPAP now despite being maxed out on high flow and was tachypneic; palliative care saw patient and she does want to be a full code and is okay with intubation     1/4->-patient was intubated on 12/20 and was extubated on 1/2; she was transferred out to stepdown on 1/3; she is hemodynamically stable, she is currently on 3 L of oxygen; speech therapy is planning a modified barium swallow today; patient is fully alert and oriented, in no distress, she is extremely weak and has a very weak voice     1/5--> hemodynamically stable, on 2 L of oxygen satting 94%; patient is alert and oriented however needs frequent redirection with her phone, she is asking for her coat \"to get out of here\"    1/6 --> No acute events overnight. Pt states that her breathing is better today. Pt states that she worked with therapy and did well. She still states that she is extremely weak. 1/7: No acute events overnight. Pt was transferred out of COVID isolation. Awaiting IPR consult. 1/8: Pt is more alert and conversational today. Pt is still very weak. This provider spoke with family about IPR.     1/9:  RN states that patient has been more confused today. Behzad Sarabia. Patient was alert and conversational during my evaluation. Following commands. Patient still states that she is very weak. And states she has been very tired today. 1/11: No acute events overnight. Pt is more awake today. Family states that she has not had any naps today. Pt is still having urinary retention issues. Pt has no abd pain. Pt wants to try to get in the chair today. 1/12: No acute events overnight. Patient was able to get in the chair with 2 assist in Aurye Rosario steady for the 1st time since admission. Patient was able to go to the restroom for the 1st time since admission. Patient is in good spirits today. Patient is more awake today. Encourage fluids. Subjective (past 24 hours):   No acute events overnight. Pt is working with therapy well. Pt states that her hands are working better. Pt states that her mentation is clearer. Pt is excited that IPR is going to reassess. Past medical history, family history, social history and allergies reviewed again and is unchanged since admission. ROS (All review of systems completed. Pertinent positives noted.  Otherwise All other systems reviewed and negative.)     Medications:  Reviewed    Infusion Medications    sodium chloride 25 mL (01/03/22 0225)    dextrose       Scheduled Medications    polycarbophil  625 mg Oral Daily    fluconazole  200 mg Oral Daily    midodrine  5 mg Oral Once    insulin glargine  20 Units SubCUTAneous QAM AC    insulin lispro  0-12 Units SubCUTAneous TID WC    insulin lispro  0-6 Units SubCUTAneous Nightly    famotidine  20 mg Oral BID    furosemide  40 mg Oral Daily    rosuvastatin  10 mg Oral Daily    aspirin  81 mg Per NG tube Daily    enoxaparin  30 mg SubCUTAneous Q12H     PRN Meds: hydrOXYzine, acetaminophen **OR** acetaminophen, bisacodyl, dextrose, sodium chloride flush, sodium chloride, ondansetron **OR** ondansetron, senna, polyethylene glycol, glucose, dextrose, glucagon (rDNA), dextrose, albuterol sulfate HFA      Intake/Output Summary (Last 24 hours) at 1/13/2022 1122  Last data filed at 1/13/2022 1111  Gross per 24 hour   Intake 420 ml   Output 652 ml   Net -232 ml       Diet:  ADULT DIET; Dysphagia - Soft and Bite Sized  ADULT ORAL NUTRITION SUPPLEMENT; Breakfast, Lunch, Dinner; Diabetic Oral Supplement    Exam:  /65   Pulse 75   Temp 98.1 °F (36.7 °C) (Oral)   Resp 18   Ht 5' 2\" (1.575 m)   Wt 190 lb 9.6 oz (86.5 kg)   SpO2 95%   BMI 34.86 kg/m²   General appearance: Chronically ill appearing white female   HEENT: Pupils equal, round, and reactive to light. Conjunctivae/corneas clear. Neck: Supple, with full range of motion. No jugular venous distention. Trachea midline. Respiratory:  Normal respiratory effort on RA. Breath sounds diminished. Cardiovascular: Regular rate and rhythm with normal S1/S2 without murmurs, rubs or gallops. Abdomen: Soft, non-tender, non-distended with normal bowel sounds. Musculoskeletal: passive and active ROM x 4 extremities. Skin: Skin color, texture, turgor normal.  No rashes or lesions. Neurologic:  Neurovascularly intact without any focal sensory/motor deficits. Cranial nerves: II-XII intact, grossly non-focal.  Psychiatric: Alert and oriented, more conversational   Capillary Refill: Brisk,< 3 seconds   Peripheral Pulses: +2 palpable, equal bilaterally     Labs:   Recent Labs     01/10/22  1536 01/12/22  0721   WBC 8.7 5.6   HGB 11.8* 12.0   HCT 36.0* 36.9*   * 112*     Recent Labs     01/10/22  1536 01/12/22  0721   * 135   K 3.6 4.7    102   CO2 23 23   BUN 10 8   CREATININE 0.6 0.5   CALCIUM 8.0* 8.5     No results for input(s): AST, ALT, BILIDIR, BILITOT, ALKPHOS in the last 72 hours. No results for input(s): INR in the last 72 hours. No results for input(s): Ronald Mitchell in the last 72 hours.     Microbiology:    Blood culture #1:   Lab Results   Component Value Date    BC No growth-preliminary  01/02/2022    [facility]  01/02/2022     possible contamination; clinical correlation required     BC No growth-preliminary No growth  01/02/2022     Urine culture:   Lab Results   Component Value Date    LABURIN No growth-preliminary No growth  12/20/2021     Urinalysis:      Lab Results   Component Value Date    NITRU NEGATIVE 01/10/2022    WBCUA 15-25 01/10/2022    BACTERIA NONE 01/10/2022    RBCUA > 200 01/10/2022    BLOODU LARGE 01/10/2022    SPECGRAV 1.026 12/31/2021    GLUCOSEU NEGATIVE 01/10/2022       Radiology:  CT ABDOMEN PELVIS W IV CONTRAST Additional Contrast? Oral   Final Result   1. There is a segment of colonic wall thickening involving the sigmoid colon as well as the descending colon with pericolonic strandy opacity and diverticular disease along the sigmoid colon (most consistent with diverticulitis. There is a small amount    of gas within the urinary bladder empty dependently on axial image 70 which is nonspecific but could be related to recent catheterization. Correlate clinically. There is no gross evidence of oral contrast within the urinary bladder on the current    examination. 2. Limited evaluation of the lung bases demonstrates mild dependent bibasilar opacities which may represent mild atelectasis or pneumonia. Small bilateral pleural effusions are seen. 3. There is a trace amount of ascitic fluid in the perihepatic and perisplenic regions   No acute osseous findings are seen. Lower lumbar facet arthrosis is demonstrated. **This report has been created using voice recognition software. It may contain minor errors which are inherent in voice recognition technology. **      Final report electronically signed by Dr. Aga Maloney on 1/10/2022 12:13 PM      FL MODIFIED BARIUM SWALLOW W VIDEO   Final Result   Laryngeal penetration without aspiration with thin liquids. Recommendations and comments available from speech therapy            **This report has been created using voice recognition software.   It may contain minor errors which are inherent in voice recognition technology. **      Final report electronically signed by Dr. Bee Guerrero on 1/4/2022 11:34 AM      XR CHEST PORTABLE   Final Result   Stable mild bilateral patchy pulmonary opacities. **This report has been created using voice recognition software. It may contain minor errors which are inherent in voice recognition technology. **      Final report electronically signed by Dr. Myrtle Garza on 1/1/2022 7:25 AM      VL DUP LOWER EXTREMITY ARTERIES LEFT   Final Result   No significant stenosis or occlusion. **This report has been created using voice recognition software. It may contain minor errors which are inherent in voice recognition technology. **      Final report electronically signed by Dr. Bee Guerrero on 12/30/2021 3:17 PM      XR CHEST PORTABLE   Final Result   No interval change. This document has been electronically signed by: Roberto Nova DO on    12/28/2021 02:39 AM      XR CHEST PORTABLE   Final Result   Impression:   Bilateral consolidations. Improving left-sided infiltrates. This document has been electronically signed by: Laurence Ferrell MD on    12/25/2021 05:09 AM      XR CHEST PORTABLE   Final Result   1. Support lines and tubes as outlined above, ET tube is 4.4 cm superior    to the telma. 2. Diffuse scattered infiltrates and airspace disease throughout the    bilateral lungs. More complex consolidation involving the right lung base. This document has been electronically signed by: Roberto Nova DO on    12/24/2021 03:04 AM      XR CHEST PORTABLE   Final Result   Interval placement of right-sided PICC line with catheter tip at the cavoatrial junction with stable diffuse patchy lung opacities bilaterally. **This report has been created using voice recognition software. It may contain minor errors which are inherent in voice recognition technology. **      Final report electronically signed by Dr. Fidelia Gaffney MD on 12/21/2021 7:10 PM      XR CHEST PORTABLE   Final Result   Diffuse patchy lung opacities as evidence for diffuse pneumonia or ARDS following intubation. **This report has been created using voice recognition software. It may contain minor errors which are inherent in voice recognition technology. **      Final report electronically signed by Dr. Elodia Voss MD on 12/20/2021 7:21 PM      XR CHEST PORTABLE   Final Result   1. Marked worsening of the diffuse airspace opacities relating to multilobar pneumonia. **This report has been created using voice recognition software. It may contain minor errors which are inherent in voice recognition technology. **      Final report electronically signed by Dr Gera Rodriguez on 12/20/2021 11:36 AM      CTA CHEST [address]   Final Result      1. No evidence of pulmonary embolism. 2. Areas of abnormal density throughout both lung fields consistent with inflammatory process, presumably Covid 19 infection. 3. Mild cardiomegaly. 4. Mild dilatation of the ascending aorta. 5. Thoracic spondylosis. 6. Hiatal hernia. 7. Hepatosplenomegaly. **This report has been created using voice recognition software. It may contain minor errors which are inherent in voice recognition technology. **      Final report electronically signed by DR Lilian Zuñiga on 12/18/2021 3:02 PM      XR CHEST PORTABLE   Final Result   1. Diffuse groundglass opacification of both lungs with areas of interstitial thickening and nodular consolidative opacities. Findings likely relate to multilobar pneumonia. Pulmonary edema could also give this appearance. **This report has been created using voice recognition software. It may contain minor errors which are inherent in voice recognition technology. **      Final report electronically signed by Dr Gera Rodriguez on 12/18/2021 2:20 PM        Electronically signed by MICHAEL Valentine on 1/13/2022 at 11:22 AM

## 2022-01-13 NOTE — PROGRESS NOTES
[address] [address]  INPATIENT PHYSICAL THERAPY  DAILY NOTE  STRZ SURGE OVERFLOW - 8E-71/8E-71A    Time In: 09  Time Out: 1675  Timed Code Treatment Minutes: 23 Minutes  Minutes: 23          Date: 2022  Patient Name: Juana Mallory,  Gender:  female        MRN: [id number]  : 1950  (73 y.o.)     Referring Practitioner: Rika Laureano CNP  Diagnosis: Hypoxia  Additional Pertinent Hx: Orlando Guidry is a 30-year-old white female who presented to [facility] on 2021 with complaints of shortness of breath she has a past medical history lifetime non-smoker, CAD with stent placement in , hypertension, diabetes mellitus, dyslipidemia. Per report she presented to [facility] on  with complaints of hypoxia and shortness of breath. She was initially placed on 4 L nasal cannula.  patient had persistent hypoxia and was transitioned to high flow nasal cannula. On  patient was transitioned to BiPAP and transferred to [facility] ICU for intubation. Pt extubated . Prior Level of Function:  Lives With: Spouse  Type of Home: House  Home Layout: One level  Home Equipment:  (None)        ADL Assistance: Independent  Homemaking Assistance: Independent  Ambulation Assistance: Independent  Transfer Assistance: Independent  Active : Yes  Additional Comments: Pt fully I prior to admission, 6 kids    Restrictions/Precautions:  Restrictions/Precautions: General Precautions,Fall Risk  Position Activity Restriction  Other position/activity restrictions: out of COVID isolation 22, monitor BP     SUBJECTIVE: RN approved session. Patient sitting in recliner upon arrival and agreeable to therapy. Ramon Renae in to assess transfers this session. Patient's daughter present to observe session. PAIN: denies    Vitals: Vitals not assessed per clinical judgement, see nursing flowsheet    OBJECTIVE:  Bed Mobility:  Not Tested    Transfers:  Sit to Stand:  Moderate Assistance, X 2, with increased time for completion, cues for hand placement, with verbal cues, from bedside chair to RW  Stand to [facility] 68, Minimal Assistance, X 2, with increased time for completion, with verbal cues   **completed 2x sit<>stand from chair to RW    Balance:  Static Standing Balance: [employer], Minimal Assistance, X 2, BUE support at [address] [place] [address], unable to weight shift lateral or advance LE for pre-gait at this time due to anxiety and weakness    Exercise:  Patient was guided in 1 set(s) 10 reps of exercise to both lower extremities. Ankle pumps, Quad sets, Heelslides, Hip abduction/adduction, Straight leg raises, Seated marches, Seated heel/toe raises, Long arc quads and Seated isometric hip adduction. Exercises were completed for increased independence with functional mobility. Functional Outcome Measures: Completed  AM-PAC Inpatient Mobility Raw Score : 9  -[facility] Inpatient T-Scale Score : 30.55    ASSESSMENT:  Assessment: Patient progressing toward established goals. Activity Tolerance:  Patient tolerance of  treatment: good. Equipment Recommendations: Other: will monitor for needs pending progress and DC destination  Discharge Recommendations: Continue to assess pending progress, Patient would benefit from continued PT at discharge, Inpatient Therapy Stay and Recommend Physiatry Consult  Plan: Times per week: 5x GM  Current Treatment Recommendations: Strengthening,Positioning,Patient/Caregiver Education & Training,Safety Education & Training,Balance Training,Endurance Training,Functional Mobility Training,Neuromuscular Re-education    Patient Education  Patient Education: Plan of Care, Transfers, Verbal Exercise Instruction    Goals:  Patient goals : to get stronger  Short term goals  Time Frame for Short term goals: by discharge  Short term goal 1: Pt to roll L and R In bed with mod A to improve bed mobility.   Short term goal 2: Pt to transfer supine <--> sit

## 2022-01-13 NOTE — PROGRESS NOTES
[address] SURGE OVERFLOW  Occupational Therapy  Daily Note  Time:   Time In: 4674  Time Out: 1352  Timed Code Treatment Minutes: 30 Minutes  Minutes: 30          Date: 2022  Patient Name: Adelso Lozano,   Gender: female      Room: [facility]  MRN: [id number]  : 1950  (73 y.o.)  Referring Practitioner: WYATT Olea CNP  Diagnosis: hypoxia  Additional Pertinent Hx: Ann Dominguez is a 62-year-old white female who presented to [facility] on 2021 with complaints of shortness of breath she has a past medical history lifetime non-smoker, CAD with stent placement in , hypertension, diabetes mellitus, dyslipidemia. Per report she presented to [facility] on  with complaints of hypoxia and shortness of breath. She was initially placed on 4 L nasal cannula.  patient had persistent hypoxia and was transitioned to high flow nasal cannula. On  patient was transitioned to BiPAP and transferred to [facility] ICU for intubation. Pt extubated . Restrictions/Precautions:  Restrictions/Precautions: General Precautions,Fall Risk  Position Activity Restriction  Other position/activity restrictions: out of COVID isolation 22, monitor BP      SUBJECTIVE: Pt supine in bed upon arrival just finishing lunch. Pt agreeable to OT session, perseverating on \"waiting for the suppository to work. \"     Nursing requesting to leave pt in bed due to suppository will be working soon. Nursing reported would assist her up to the chair this afternoon/evening for supper. PAIN: 0/10:     Vitals: Vitals not assessed per clinical judgement, see nursing flowsheet    COGNITION: Decreased Recall, Impaired Memory and Tangential    ADL:   Feeding: Independent.  eating sherbert on arrival.      ADDITIONAL ACTIVITIES:  Pt completed BUE AROM/AAROM exercises while supine in bed.  AAROM At B shoulders with min/moderate assist/support, AROM for distal exercises. Pt completed 1 set X 10 repetitions in all planes with short rest breaks in between variations. Exercises completed to increase overall strength/endurance needed for ADLs and transfers. ASSESSMENT:     Activity Tolerance:  Patient tolerance of  treatment: good. Discharge Recommendations: Inpatient Rehabilitation  Equipment Recommendations: Equipment Needed:  (defer to next level of care)  Plan: Times per week: 6x  Times per day: Daily  Current Treatment Recommendations: Strengthening,Balance Training,Functional Mobility Training,Endurance Training,Home Management Training,Patient/Caregiver Education & Training,Self-Care / ADL,Safety Education & Training    Patient Education  Patient Education: Plan of Care and Home Exercise Program    Goals  Short term goals  Time Frame for Short term goals: by discharge  Short term goal 1: Pt will tolerate sitting on EOB >10 min with no > than Min A x1 and min VC while maintaining O2 >90% for increased indep with ADLs  Short term goal 2: Pt will complete EOB ADLs with Mod A for AAROM and min VC for increased indep with grooming  Short term goal 3: Pt will complete BUE AROM/AAROM with no > than Min A and min VC to increase UB strength and endurance to increase indep with ADLs  Short term goal 4: Pt will complete sit to stand transfers with no > min A x 2 persons consistenlty to increase safe toilet transfers. Short term goal 5: Pt will tolerate standing > 45 seconds with CGA x 2 persons to increase endurance for toileting routine    Following session, patient left in safe position with all fall risk precautions in place.

## 2022-01-14 ENCOUNTER — HOSPITAL ENCOUNTER (INPATIENT)
Age: 72
LOS: 14 days | Discharge: HOME HEALTH | DRG: 092 | End: 2022-01-28
Attending: PHYSICAL MEDICINE & REHABILITATION | Admitting: PHYSICAL MEDICINE & REHABILITATION
Payer: MEDICARE

## 2022-01-14 VITALS
DIASTOLIC BLOOD PRESSURE: 91 MMHG | TEMPERATURE: 98.3 F | OXYGEN SATURATION: 95 % | WEIGHT: 190.8 LBS | HEART RATE: 98 BPM | BODY MASS INDEX: 35.11 KG/M2 | SYSTOLIC BLOOD PRESSURE: 131 MMHG | RESPIRATION RATE: 18 BRPM | HEIGHT: 62 IN

## 2022-01-14 DIAGNOSIS — R53.81 DEBILITY: ICD-10-CM

## 2022-01-14 DIAGNOSIS — E11.9 TYPE 2 DIABETES MELLITUS WITHOUT COMPLICATION, WITHOUT LONG-TERM CURRENT USE OF INSULIN (HCC): ICD-10-CM

## 2022-01-14 DIAGNOSIS — R47.1 DYSARTHRIA: ICD-10-CM

## 2022-01-14 DIAGNOSIS — R09.02 HYPOXEMIA: ICD-10-CM

## 2022-01-14 DIAGNOSIS — U07.1 PNEUMONIA DUE TO COVID-19 VIRUS: Primary | ICD-10-CM

## 2022-01-14 DIAGNOSIS — Z95.5 HISTORY OF CORONARY ANGIOPLASTY WITH INSERTION OF STENT: ICD-10-CM

## 2022-01-14 DIAGNOSIS — E66.9 OBESITY (BMI 30-39.9): ICD-10-CM

## 2022-01-14 DIAGNOSIS — R53.81 PHYSICAL DECONDITIONING: ICD-10-CM

## 2022-01-14 DIAGNOSIS — G72.81 CRITICAL ILLNESS MYOPATHY: ICD-10-CM

## 2022-01-14 DIAGNOSIS — J12.82 PNEUMONIA DUE TO COVID-19 VIRUS: Primary | ICD-10-CM

## 2022-01-14 DIAGNOSIS — U07.1 COVID-19: ICD-10-CM

## 2022-01-14 DIAGNOSIS — Z86.79 HISTORY OF CORONARY ARTERY DISEASE: ICD-10-CM

## 2022-01-14 DIAGNOSIS — J96.01 ACUTE RESPIRATORY FAILURE WITH HYPOXIA (HCC): ICD-10-CM

## 2022-01-14 DIAGNOSIS — Z86.16 HISTORY OF COVID-19: ICD-10-CM

## 2022-01-14 PROBLEM — I42.9 CARDIOMYOPATHY (HCC): Status: ACTIVE | Noted: 2022-01-14

## 2022-01-14 PROBLEM — I10 PRIMARY HYPERTENSION: Status: ACTIVE | Noted: 2022-01-14

## 2022-01-14 LAB
GLUCOSE BLD-MCNC: 132 MG/DL (ref 70–108)
GLUCOSE BLD-MCNC: 213 MG/DL (ref 70–108)
GLUCOSE BLD-MCNC: 217 MG/DL (ref 70–108)
GLUCOSE BLD-MCNC: 231 MG/DL (ref 70–108)

## 2022-01-14 PROCEDURE — 2500000003 HC RX 250 WO HCPCS: Performed by: PHYSICAL MEDICINE & REHABILITATION

## 2022-01-14 PROCEDURE — 6370000000 HC RX 637 (ALT 250 FOR IP): Performed by: PHYSICAL MEDICINE & REHABILITATION

## 2022-01-14 PROCEDURE — 6360000002 HC RX W HCPCS: Performed by: PHYSICIAN ASSISTANT

## 2022-01-14 PROCEDURE — 82948 REAGENT STRIP/BLOOD GLUCOSE: CPT

## 2022-01-14 PROCEDURE — 1180000000 HC REHAB R&B

## 2022-01-14 PROCEDURE — 6370000000 HC RX 637 (ALT 250 FOR IP): Performed by: NURSE PRACTITIONER

## 2022-01-14 PROCEDURE — 6360000002 HC RX W HCPCS: Performed by: PHYSICAL MEDICINE & REHABILITATION

## 2022-01-14 PROCEDURE — 51798 US URINE CAPACITY MEASURE: CPT

## 2022-01-14 PROCEDURE — 99222 1ST HOSP IP/OBS MODERATE 55: CPT | Performed by: PHYSICAL MEDICINE & REHABILITATION

## 2022-01-14 PROCEDURE — 99239 HOSP IP/OBS DSCHRG MGMT >30: CPT | Performed by: HOSPITALIST

## 2022-01-14 PROCEDURE — 6370000000 HC RX 637 (ALT 250 FOR IP): Performed by: PHYSICIAN ASSISTANT

## 2022-01-14 RX ORDER — BISACODYL 10 MG
10 SUPPOSITORY, RECTAL RECTAL DAILY PRN
Status: CANCELLED | OUTPATIENT
Start: 2022-01-14

## 2022-01-14 RX ORDER — HYDROXYZINE PAMOATE 25 MG/1
25 CAPSULE ORAL 4 TIMES DAILY PRN
Status: CANCELLED | OUTPATIENT
Start: 2022-01-14

## 2022-01-14 RX ORDER — ONDANSETRON 4 MG/1
4 TABLET, ORALLY DISINTEGRATING ORAL EVERY 8 HOURS PRN
Status: DISCONTINUED | OUTPATIENT
Start: 2022-01-14 | End: 2022-01-28 | Stop reason: HOSPADM

## 2022-01-14 RX ORDER — NICOTINE POLACRILEX 4 MG
15 LOZENGE BUCCAL PRN
Status: DISCONTINUED | OUTPATIENT
Start: 2022-01-14 | End: 2022-01-28 | Stop reason: HOSPADM

## 2022-01-14 RX ORDER — DEXTROSE MONOHYDRATE 25 G/50ML
12.5 INJECTION, SOLUTION INTRAVENOUS PRN
Status: DISCONTINUED | OUTPATIENT
Start: 2022-01-14 | End: 2022-01-28 | Stop reason: HOSPADM

## 2022-01-14 RX ORDER — ROSUVASTATIN CALCIUM 10 MG/1
10 TABLET, COATED ORAL DAILY
Status: CANCELLED | OUTPATIENT
Start: 2022-01-14

## 2022-01-14 RX ORDER — SODIUM CHLORIDE 9 MG/ML
25 INJECTION, SOLUTION INTRAVENOUS PRN
Status: DISCONTINUED | OUTPATIENT
Start: 2022-01-14 | End: 2022-01-16

## 2022-01-14 RX ORDER — DEXTROSE MONOHYDRATE 25 G/50ML
12.5 INJECTION, SOLUTION INTRAVENOUS PRN
Status: CANCELLED | OUTPATIENT
Start: 2022-01-14

## 2022-01-14 RX ORDER — ACETAMINOPHEN 325 MG/1
650 TABLET ORAL EVERY 4 HOURS PRN
Status: CANCELLED | OUTPATIENT
Start: 2022-01-14

## 2022-01-14 RX ORDER — INSULIN GLARGINE 100 [IU]/ML
20 INJECTION, SOLUTION SUBCUTANEOUS
Status: CANCELLED | OUTPATIENT
Start: 2022-01-15

## 2022-01-14 RX ORDER — BISACODYL 10 MG
10 SUPPOSITORY, RECTAL RECTAL DAILY PRN
Status: DISCONTINUED | OUTPATIENT
Start: 2022-01-14 | End: 2022-01-14 | Stop reason: SDUPTHER

## 2022-01-14 RX ORDER — DEXTROSE MONOHYDRATE 50 MG/ML
100 INJECTION, SOLUTION INTRAVENOUS PRN
Status: CANCELLED | OUTPATIENT
Start: 2022-01-14

## 2022-01-14 RX ORDER — ACETAMINOPHEN 325 MG/1
650 TABLET ORAL EVERY 4 HOURS PRN
Status: DISCONTINUED | OUTPATIENT
Start: 2022-01-14 | End: 2022-01-28 | Stop reason: HOSPADM

## 2022-01-14 RX ORDER — SODIUM CHLORIDE 0.9 % (FLUSH) 0.9 %
5-40 SYRINGE (ML) INJECTION PRN
Status: CANCELLED | OUTPATIENT
Start: 2022-01-14

## 2022-01-14 RX ORDER — HYDROXYZINE PAMOATE 25 MG/1
25 CAPSULE ORAL 4 TIMES DAILY PRN
Status: DISCONTINUED | OUTPATIENT
Start: 2022-01-14 | End: 2022-01-28 | Stop reason: HOSPADM

## 2022-01-14 RX ORDER — FLUCONAZOLE 200 MG/1
200 TABLET ORAL DAILY
Status: CANCELLED | OUTPATIENT
Start: 2022-01-14

## 2022-01-14 RX ORDER — FAMOTIDINE 20 MG/1
20 TABLET, FILM COATED ORAL 2 TIMES DAILY
Status: DISCONTINUED | OUTPATIENT
Start: 2022-01-14 | End: 2022-01-28 | Stop reason: HOSPADM

## 2022-01-14 RX ORDER — NICOTINE POLACRILEX 4 MG
15 LOZENGE BUCCAL PRN
Status: CANCELLED | OUTPATIENT
Start: 2022-01-14

## 2022-01-14 RX ORDER — CALCIUM POLYCARBOPHIL 625 MG 625 MG/1
625 TABLET ORAL DAILY
Status: DISCONTINUED | OUTPATIENT
Start: 2022-01-15 | End: 2022-01-28 | Stop reason: HOSPADM

## 2022-01-14 RX ORDER — LANOLIN ALCOHOL/MO/W.PET/CERES
3 CREAM (GRAM) TOPICAL NIGHTLY PRN
Status: CANCELLED | OUTPATIENT
Start: 2022-01-14

## 2022-01-14 RX ORDER — FUROSEMIDE 40 MG/1
40 TABLET ORAL DAILY
Status: DISCONTINUED | OUTPATIENT
Start: 2022-01-15 | End: 2022-01-28 | Stop reason: HOSPADM

## 2022-01-14 RX ORDER — ROSUVASTATIN CALCIUM 10 MG/1
10 TABLET, COATED ORAL DAILY
Status: DISCONTINUED | OUTPATIENT
Start: 2022-01-14 | End: 2022-01-28 | Stop reason: HOSPADM

## 2022-01-14 RX ORDER — POLYETHYLENE GLYCOL 3350 17 G/17G
17 POWDER, FOR SOLUTION ORAL DAILY PRN
Status: DISCONTINUED | OUTPATIENT
Start: 2022-01-14 | End: 2022-01-14 | Stop reason: SDUPTHER

## 2022-01-14 RX ORDER — SODIUM CHLORIDE 9 MG/ML
25 INJECTION, SOLUTION INTRAVENOUS PRN
Status: CANCELLED | OUTPATIENT
Start: 2022-01-14

## 2022-01-14 RX ORDER — LANOLIN ALCOHOL/MO/W.PET/CERES
3 CREAM (GRAM) TOPICAL NIGHTLY PRN
Status: DISCONTINUED | OUTPATIENT
Start: 2022-01-14 | End: 2022-01-18

## 2022-01-14 RX ORDER — DEXTROSE MONOHYDRATE 50 MG/ML
100 INJECTION, SOLUTION INTRAVENOUS PRN
Status: DISCONTINUED | OUTPATIENT
Start: 2022-01-14 | End: 2022-01-28 | Stop reason: HOSPADM

## 2022-01-14 RX ORDER — BISACODYL 10 MG
10 SUPPOSITORY, RECTAL RECTAL DAILY PRN
Status: DISCONTINUED | OUTPATIENT
Start: 2022-01-14 | End: 2022-01-28 | Stop reason: HOSPADM

## 2022-01-14 RX ORDER — POLYETHYLENE GLYCOL 3350 17 G/17G
17 POWDER, FOR SOLUTION ORAL DAILY PRN
Status: CANCELLED | OUTPATIENT
Start: 2022-01-14

## 2022-01-14 RX ORDER — CALCIUM POLYCARBOPHIL 625 MG 625 MG/1
625 TABLET ORAL DAILY
Status: CANCELLED | OUTPATIENT
Start: 2022-01-14

## 2022-01-14 RX ORDER — ALBUTEROL SULFATE 90 UG/1
2 AEROSOL, METERED RESPIRATORY (INHALATION) EVERY 6 HOURS PRN
Status: CANCELLED | OUTPATIENT
Start: 2022-01-14

## 2022-01-14 RX ORDER — ALBUTEROL SULFATE 90 UG/1
2 AEROSOL, METERED RESPIRATORY (INHALATION) EVERY 6 HOURS PRN
Status: DISCONTINUED | OUTPATIENT
Start: 2022-01-14 | End: 2022-01-28 | Stop reason: HOSPADM

## 2022-01-14 RX ORDER — INSULIN GLARGINE 100 [IU]/ML
20 INJECTION, SOLUTION SUBCUTANEOUS
Status: DISCONTINUED | OUTPATIENT
Start: 2022-01-15 | End: 2022-01-16

## 2022-01-14 RX ORDER — POLYETHYLENE GLYCOL 3350 17 G/17G
17 POWDER, FOR SOLUTION ORAL DAILY PRN
Status: DISCONTINUED | OUTPATIENT
Start: 2022-01-14 | End: 2022-01-28 | Stop reason: HOSPADM

## 2022-01-14 RX ORDER — FLUCONAZOLE 200 MG/1
200 TABLET ORAL DAILY
Status: COMPLETED | OUTPATIENT
Start: 2022-01-15 | End: 2022-01-21

## 2022-01-14 RX ORDER — ASPIRIN 81 MG/1
81 TABLET, CHEWABLE ORAL DAILY
Status: DISCONTINUED | OUTPATIENT
Start: 2022-01-15 | End: 2022-01-28 | Stop reason: HOSPADM

## 2022-01-14 RX ORDER — DOCUSATE SODIUM 100 MG/1
100 CAPSULE, LIQUID FILLED ORAL 2 TIMES DAILY
Status: DISCONTINUED | OUTPATIENT
Start: 2022-01-14 | End: 2022-01-28 | Stop reason: HOSPADM

## 2022-01-14 RX ORDER — FUROSEMIDE 40 MG/1
40 TABLET ORAL DAILY
Status: CANCELLED | OUTPATIENT
Start: 2022-01-14

## 2022-01-14 RX ORDER — DOCUSATE SODIUM 100 MG/1
100 CAPSULE, LIQUID FILLED ORAL 2 TIMES DAILY
Status: CANCELLED | OUTPATIENT
Start: 2022-01-14

## 2022-01-14 RX ORDER — SODIUM CHLORIDE 0.9 % (FLUSH) 0.9 %
5-40 SYRINGE (ML) INJECTION PRN
Status: DISCONTINUED | OUTPATIENT
Start: 2022-01-14 | End: 2022-01-16

## 2022-01-14 RX ORDER — ASPIRIN 81 MG/1
81 TABLET, CHEWABLE ORAL DAILY
Status: CANCELLED | OUTPATIENT
Start: 2022-01-14

## 2022-01-14 RX ORDER — FAMOTIDINE 20 MG/1
20 TABLET, FILM COATED ORAL 2 TIMES DAILY
Status: CANCELLED | OUTPATIENT
Start: 2022-01-14

## 2022-01-14 RX ORDER — NITROGLYCERIN 0.4 MG/1
0.4 TABLET SUBLINGUAL EVERY 5 MIN PRN
Status: DISCONTINUED | OUTPATIENT
Start: 2022-01-14 | End: 2022-01-28 | Stop reason: HOSPADM

## 2022-01-14 RX ORDER — SENNOSIDES 8.8 MG/5ML
5 LIQUID ORAL 2 TIMES DAILY PRN
Status: DISCONTINUED | OUTPATIENT
Start: 2022-01-14 | End: 2022-01-19 | Stop reason: SDUPTHER

## 2022-01-14 RX ORDER — ONDANSETRON 4 MG/1
4 TABLET, ORALLY DISINTEGRATING ORAL EVERY 8 HOURS PRN
Status: CANCELLED | OUTPATIENT
Start: 2022-01-14

## 2022-01-14 RX ORDER — SENNOSIDES 8.8 MG/5ML
5 LIQUID ORAL 2 TIMES DAILY PRN
Status: CANCELLED | OUTPATIENT
Start: 2022-01-14

## 2022-01-14 RX ADMIN — FUROSEMIDE 40 MG: 40 TABLET ORAL at 10:10

## 2022-01-14 RX ADMIN — INSULIN LISPRO 4 UNITS: 100 INJECTION, SOLUTION INTRAVENOUS; SUBCUTANEOUS at 08:11

## 2022-01-14 RX ADMIN — ROSUVASTATIN 10 MG: 10 TABLET, FILM COATED ORAL at 21:57

## 2022-01-14 RX ADMIN — INSULIN LISPRO 4 UNITS: 100 INJECTION, SOLUTION INTRAVENOUS; SUBCUTANEOUS at 17:31

## 2022-01-14 RX ADMIN — FAMOTIDINE 20 MG: 20 TABLET, FILM COATED ORAL at 21:57

## 2022-01-14 RX ADMIN — FAMOTIDINE 20 MG: 20 TABLET ORAL at 10:10

## 2022-01-14 RX ADMIN — DOCUSATE SODIUM 100 MG: 100 CAPSULE, LIQUID FILLED ORAL at 21:57

## 2022-01-14 RX ADMIN — INSULIN GLARGINE 20 UNITS: 100 INJECTION, SOLUTION SUBCUTANEOUS at 08:13

## 2022-01-14 RX ADMIN — FLUCONAZOLE 200 MG: 200 TABLET ORAL at 12:55

## 2022-01-14 RX ADMIN — Medication 3 MG: at 21:57

## 2022-01-14 RX ADMIN — ACETAMINOPHEN 650 MG: 325 TABLET ORAL at 12:56

## 2022-01-14 RX ADMIN — MICONAZOLE NITRATE: 2 POWDER TOPICAL at 21:58

## 2022-01-14 RX ADMIN — ASPIRIN 81 MG CHEWABLE TABLET 81 MG: 81 TABLET CHEWABLE at 10:10

## 2022-01-14 RX ADMIN — ENOXAPARIN SODIUM 30 MG: 100 INJECTION SUBCUTANEOUS at 21:57

## 2022-01-14 RX ADMIN — CALCIUM POLYCARBOPHIL 625 MG: 625 TABLET, FILM COATED ORAL at 10:10

## 2022-01-14 RX ADMIN — ENOXAPARIN SODIUM 30 MG: 100 INJECTION SUBCUTANEOUS at 10:09

## 2022-01-14 ASSESSMENT — ENCOUNTER SYMPTOMS
BACK PAIN: 1
VOMITING: 0
SHORTNESS OF BREATH: 0
ABDOMINAL PAIN: 0
NAUSEA: 0
SORE THROAT: 0
EYE DISCHARGE: 0
CONSTIPATION: 0
RHINORRHEA: 0
TROUBLE SWALLOWING: 0
EYE PAIN: 0
CONSTIPATION: 1
DIARRHEA: 0
COUGH: 0
WHEEZING: 0

## 2022-01-14 ASSESSMENT — PAIN SCALES - GENERAL
PAINLEVEL_OUTOF10: 0
PAINLEVEL_OUTOF10: 0

## 2022-01-14 NOTE — PROGRESS NOTES
Clinical Pharmacy Note  Pharmacy Antimicrobial Monitoring    Current antibiotic(s): Diflucan    Total days of antibiotics: 7 more days    Indication/Diagnosis: UTI    Patient chart reviewed for signs/symptoms of infection: Yes        LMP  (LMP Unknown)   Breastfeeding No   Lab Results   Component Value Date    WBC 5.6 01/12/2022           Recommended stop date: as noted by Dr. Giovanni Summers (7 more days)    Prescriber contacted: no    Recommendations accepted: n/a

## 2022-01-14 NOTE — PROGRESS NOTES
[facility]  Acute Inpatient Rehab Preadmission Assessment    Patient Name: Jose M Mccullough        MRN: [id number]    : 1950  (73 y.o.)  Gender: female     Admitted from:[address]  Initial Assessment    Date of admission to the hospital: 2021 11:57 AM  Date patient eligible for admission:2022    Primary Diagnosis: Critical illness myopathy      Did patient have surgery?  no    Physicians: Heather Benjamni MD, Dr. Viraj Bruno, Dr. Celina Aponte, Dr. David Trinh for clinical complications/co-morbidities: No past medical history on file. Financial Information  Primary insurance: Medicare    Secondary Insurance:   [employer]     Has the patient had two or more falls in the past year or any fall with injury in the past year? no    Did the patient have major surgery during the 100 days prior to admission? no    Precautions: Restrictions/Precautions: General Precautions,Fall Risk  Other position/activity restrictions: out of COVID isolation 22, monitor BP      Isolation Precautions: None       Physiatrist: Dr. Viraj Bruno    Patients Occupation: Retired    Reviewed Lab and Diagnostic reports from Current Admission: Yes    Patients Prior Functional  Level: Prior Function  ADL Assistance: Independent  Homemaking Assistance: Independent  Ambulation Assistance: Independent  Transfer Assistance: Independent  Additional Comments: Pt fully I prior to admission, 6 kids    Current functional status for upper extremity ADLs: Minimal assistance    Current functional status for lower extremity ADLs: Moderate assistance    Current functional status for bed, chair, wheelchair transfers: Moderate assistance    Current functional status for toilet transfers:  Moderate assistance    Current functional status for locomotion: N/T    Current functional status for bladder management: Moderate assistance    Current functional status for bowel management:Moderate assistance    Current functional status for comprehension: Moderate assistance    Current functional status for expression: Moderate assistance    Current functional status for social interaction: Moderate assistance    Current functional status for problem solving: Moderate assistance    Current functional status for memory: Moderate assistance    Expected level of Improvement in Self-Care:  Modified independence    Expected level of Improvement in Sphincter Control:  Modified independence    Expected level of Improvement in Transfers: Modified independence    Expected level of Improvement in Locomotion:  Modified independence    Expected level of Improvement in Communication and Social Cognition: Modified independence    Expected length of time to achieve that level of improvement: 2 weeks    Current rehab issues: ADL dysfunction,bladder management,bowel management, carry over of therapy techniques, discharge planning, disease and co-morbidity management, gait/mobility dysfunction, medication management, nutrition and hydration management, Ongoing assessment of safety, Pain management, Patient and family education, Prevention of secondary complications, Skin Integrity, cognitive impairment, communication impairment. Required therapy: Physical Therapy, Occupational Therapy and Speech Therapy 3 hours per day, 5-6 days per week. Recreational Therapy 1 hour per week. Expected Discharge Destination: Home    Expected Post Discharge Treatments: Home Care    Other information relevant to the care needs:   Lives With: Spouse  Type of Home: House  Home Layout: One level  Home Equipment:  (None)  ADL Assistance: Independent  Homemaking Assistance: Independent  Ambulation Assistance: Independent  Transfer Assistance: Independent  Active : Yes  Additional Comments: Pt fully I prior to admission, 6 kids    Acute Inpatient Rehabilitation Disclosure Statement provided to patient. Patient verbalized understanding.      I have reviewed and concur with the findings and results of the pre-admission screening assessment completed by the Inpatient Rehabilitation Admissions Coordinator.     Sandi Ji MD

## 2022-01-14 NOTE — FLOWSHEET NOTE
[facility]  PHYSICAL THERAPY MISSED TREATMENT NOTE  STRZ SURGE OVERFLOW    Date: 2022  Patient Name: Ángel Hightower        MRN: [id number]   : 1950  (73 y.o.)  Gender: female   Referring Practitioner: Darby Myrick CNP  Diagnosis: Hypoxia         REASON FOR MISSED TREATMENT:  Missed Treat. Patient going to Inpatient Rehab today. Will continue to follow.

## 2022-01-14 NOTE — PROGRESS NOTES
Admitted to the Inpatient Rehabilitation Unit via bed. Patient was then oriented to room and unit. Education provided on the rehabilitation routine: three hours of therapy five days per week and dining room for lunch. Explained patients right to have family, representative or physician notified of their admission. Patient has Declined for physician to be notified. Patient has Declined for family/representative to be notified. Admitting medication orders compared with acute stay medications; home medication list reviewed with patient/family. Medication issues identified No  Medication issue: na  If yes, physician notified Dr Carlos Wan. Bladder and Bowel Function Assessment:  1. Prior history of bladder problems: retention  2. Number of pads used per day: unable to answer  3. Frequency of night time voiding: unable to tell per pt  4. Fluid intake volume and pattern: pitcher of water at bedside, bladder scan every 6 hours and cath over 400 ml  5. Last BM: 1/14/22  6. Bowel problems (prior or current) No      Incontinence      Frequent diarrhea      No BM in 3 days this stay or history of constipation      Hemorrhoids      Diverticulitis      Bowel Surgery     Two nurse skin assessment performed by DR VANG  and JULIETH VANG . Weight: 190 (previous weight) pt will need new bed weight tonight, pt unable to stand for standing weight. Skin: unblanchable redness to coccyx-pictured, trauma to L middle finger (removal of rings)-pictured       Care plan was created with patient's input and goals were agreed upon. Admission folder provided with education regarding patients diagnoses, fall prevention, skin care, and M in the box. \"Data Collection Information Summary for Patients in Inpatient Rehabilitation Facilities\" and \"Privacy Act Statement - Health Care Records\" provided. Please refer to the admission navigator for further information.

## 2022-01-14 NOTE — H&P
Physical Medicine & Rehabilitation Admission History and Physical    Impression:  Critical illness myopathy   Debility/physical decondition secondary to [id number] pneumonia, complicated by bacterial pneumonia, bradycardia, and decubitus ulcer  History of COVID-19 pneumonia with acute hypoxic respiratory failure requiring intubation, complicated by Klebsiella oxytocin bacterial pneumonia  Mild dysphagia/moderate pharyngeal dysphagia  Mild cognitive impairment  Hypertension  Diabetes mellitus type 2  History of coronary artery disease requiring coronary artery stenting  Severe cardiomyopathy with reduced ejection fraction  Obesity      Plan:   Admit to the inpatient rehabilitation unit. The patient demonstrates good potential to participate in an inpatient rehabilitation program involving at least 3 hours per day, 5 days per week of intensive rehabilitation. Rehabilitation services will include PT, OT and SLP/RT in order to improve functional status prior to discharge. Family education and training will be completed. Equipment evaluations and recommendations will be completed as appropriate. Rehabilitation nursing will be involved for bowel, bladder, skin, and pain management. Nursing will also provide education and training to patient and family. Prophylaxis:  DVT: Lovenox, KENA stocking, intermittent pneumatic compression device. GI: Colace, FiberCon, Dulcolax suppository as needed, milk of magnesium as needed, GlycoLax as needed, Senokot as needed. Pain: Tylenol as needed  Continue aspirin, Crestor for coronary artery disease  Continue Pepcid for gastric protection  Continue Lasix for hypertension  Continue Crestor for hyperlipidemia  Continue albuterol inhaler as needed for COVID-19 pneumonia  Melatonin as needed for insomnia  Nutrition:  Consultation to dietician for nutritional counseling and recommendations. Prealbumin will be checked on admission.    Bladder: Monitoring signs or symptoms of UTI  Bowel: Monitoring signs or symptoms of constipation   and case management consultations for coordination of care and discharge planning    The main medical problem(s) and comorbidities being actively managed by the physicians and requiring 24 hour rehabilitation nursing care during this stay include hypertension, diabetes mellitus, coronary artery disease, cardiomyopathy, COVID-19 pneumonia. The domains of functional impairment present in this patient which will require an intensive and interdisciplinary rehabilitation environment include self care, mobility, bowel/bladder management, pain management, safety and cognitive function. Estimated length of stay for this admission : probably 2~3 weeks    Anticipated disposition: Home. The potential to achieve that is good.    ==========================================================================================================================      Chief Complaint and Reason for Rehabilitation Admission:   Fatigue and tired      History of Present Illness:  Marilyn Tolentino  is a 73 y.o. right-handed obese  female with a history of hypertension, diabetes mellitus type 2, coronary artery disease requiring coronary artery stenting, status post bilateral eyes cataract surgeries, status post 3  sections, status post hysterectomy, status post hiatal hernia repair, status post sinus surgery, is admitted to the inpatient rehabilitation unit on 2022 for intensive inpatient rehabilitation treatment of impaired ADL and ambulation secondary to critical illness myopathy and disability/physical decondition as result of COVID-19 pneumonia. The patient says he does not quite remember how she ended up in the hospital.    The patient apparently began experiencing reduced upper size, change in taste and smell, shortness of breath, nausea, dry heaves, and diarrhea starting in 2021. The symptom progressively worsened.  She was advised Contact Guard Assistance, Minimal Assistance, X 2, BUE support at [address] [place] [address], unable to weight shift lateral or advance LE for pre-gait at this time due to anxiety and weakness      OT:    (2022) :  COGNITION: Decreased Recall, Impaired Memory and Tangential     ADL:   Feeding: Independent.  eating sherbet on arrival.     ADDITIONAL ACTIVITIES:  Pt completed BUE AROM/AAROM exercises while supine in bed. AAROM At B shoulders with min/moderate assist/support, AROM for distal exercises. Pt completed 1 set X 10 repetitions in all planes with short rest breaks in between variations. Exercises completed to increase overall strength/endurance needed for ADLs and transfers. ST:    (2022) : Patient with adequate pinkish hue and observed good oral hygiene upon ST arrival. Completed skilled dietary analysis this date of mashed potatoes, hamburger, mac and cheese and green beans. Patient demonstrated mildly reduced textural breakdown with solids this date in which independent liquid wash was effective in clearing lingual stasis. Patient required minimal cues throughout to complete additional swallow. Timely AP transit and swallow initiation observed. Suspect inadequate airway protection as patient demonstrated x2 throat clears during meal. Patient cued to cough and re-swallow with good success. Cannot r/o pharyngeal phase dysfunctions without formal imaging. MBS not warranted at this time. Recommend continuation of soft and bite size diet with thin liquids without overt s/s of aspiration to assist in nutrition/hydration measures.       Past Medical History:      Diagnosis Date    Coronary artery disease     Primary hypertension     Type 2 diabetes mellitus ([facility] [address] 2018       Primary care provider: Nima Hall     Past Surgical History:      Procedure Laterality Date    CATARACT REMOVAL Bilateral      SECTION      3 times    CORONARY ANGIOPLASTY WITH STENT PLACEMENT      stenting twice    HIATAL HERNIA REPAIR      late 1990s    HYSTERECTOMY, TOTAL ABDOMINAL      in [address]      in 1990s       Allergies: Allergies   Allergen Reactions    Sulfa Antibiotics     Codeine Nausea And Vomiting        Current Medications:    No current facility-administered medications for this encounter. Social History:  Social History     Socioeconomic History    Marital status:      Spouse name: Not on file    Number of children: Not on file    Years of education: Not on file    Highest education level: Not on file   Occupational History    Not on file   Tobacco Use    Smoking status: Never Smoker    Smokeless tobacco: Never Used   Substance and Sexual Activity    Alcohol use: Yes     Comment: drinks 1 glass of wine cooler or wine about 3 times per year    Drug use: Never    Sexual activity: Not on file   Other Topics Concern    Not on file   Social History Narrative    Not on file     Social Determinants of Health     Financial Resource Strain:     Difficulty of Paying Living Expenses: Not on file   Food Insecurity:     Worried About Running Out of Food in the Last Year: Not on file    Benjamin of Food in the Last Year: Not on file   Transportation Needs:     Lack of Transportation (Medical): Not on file    Lack of Transportation (Non-Medical):  Not on file   Physical Activity:     Days of Exercise per Week: Not on file    Minutes of Exercise per Session: Not on file   Stress:     Feeling of Stress : Not on file   Social Connections:     Frequency of Communication with Friends and Family: Not on file    Frequency of Social Gatherings with Friends and Family: Not on file    Attends Synagogue Services: Not on file    Active Member of Clubs or Organizations: Not on file    Attends Club or Organization Meetings: Not on file    Marital Status: Not on file   Intimate Partner Violence:     Fear of Current or Ex-Partner: Not on file    Emotionally Abused: Not on file   Christine Hermosillo Physically Abused: Not on file    Sexually Abused: Not on file   Housing Stability:     Unable to Pay for Housing in the Last Year: Not on file    Number of Places Lived in the Last Year: Not on file    Unstable Housing in the Last Year: Not on file     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 Negative for chills, diaphoresis and fever. HENT: Negative for ear discharge, ear pain, hearing loss, rhinorrhea, sneezing, sore throat, tinnitus and trouble swallowing. Eyes: Negative for pain, discharge and visual disturbance. Respiratory: Negative for cough, shortness of breath and wheezing. Cardiovascular: Negative for chest pain, palpitations and leg swelling. Gastrointestinal: Positive for constipation. Negative for abdominal pain, diarrhea, nausea and vomiting. Endocrine: Negative for cold intolerance and heat intolerance. Genitourinary: Negative for difficulty urinating and dysuria. Musculoskeletal: Positive for arthralgias (right hip), back pain and gait problem. Negative for joint swelling, myalgias and neck pain. Skin: Negative for rash. Allergic/Immunologic: Negative for food allergies. Neurological: Negative for dizziness, tremors, seizures, speech difficulty, weakness, light-headedness, numbness and headaches. Hematological: Does not bruise/bleed easily.    Psychiatric/Behavioral: Negative for confusion, decreased concentration, dysphoric mood, hallucinations and sleep disturbance. The patient is not nervous/anxious. Physical Exam:  There were no vitals taken for this visit.   General:  well-developed, well nourished obese  female ; in no acute distress ; appropriate affect & mood; lying on bed comfortably  Eyes: pupil equally round ; extra-ocular motion intact bilaterally  Head, Ear, Nose, Mouth & Throat : normocephalic ; no tenderness at face and head scalp ; no discharge from ears or nose ; no deformity ; no facial swelling ; oral mucosa pink   Neck :  supple ; no tenderness ; no muscle spasm  Cardiovascular : regular rate & rhythm ; normal S1 & S2 heart sound ; no murmur ; normal peripheral pulse at bilateral upper & lower extremities  Pulmonary : Breath sounds present at bilateral lung fields; no wheezing ; no rale; no crackle  Gastrointestinal : soft, protruded abdomen without tenderness ; normal bowel sound present   Back : no tenderness; no muscle spasm  Skin: no skin lesion or rash ; no pitting edema at all 4 extremities; present small decubitus ulcer at sacral area  Musculoskeletal : no limb asymmetry; no limb deformity; no tenderness at bilateral upper & lower extremities; no palpable mass at limbs ; no joints laxity or crepitation ; hip flexion passive ROM reaching 90 degrees bilaterally; ankle dorsiflexion passive ROM reaching 5 degrees bilaterally; otherwise normal functional joints ROM at the rest of bilateral upper & lower extremities  Cerebral :  alert ; awake ; oriented to place, person and time; follow two-step verbal command; able to recall 3/3 items given immediately and also 3/3 items 3 minutes later; able to repeat series of 5 single digits numbers in right order forward but not backward; abstract thinking intact; perform serial 7 subtraction test for only one-step with 1 mistake (100-103-) ; perform simple one-step subtraction correctly (13-7=6)  Cerebellum : no dysmetria with bilateral finger-to-nose test ; unable to perform heel-to-shin test on both sides  Cranial Nerves :  grossly intact CN II to XII function  Sensory : intact light touch and pin prick sensation at bilateral upper & lower extremities  Motor : normal tone at bilateral upper & lower extremities ; 4+/5 muscle strength at bilateral shoulder flexion & abduction ; 4+/5 muscle strength at the bilateral elbow flexion and extension; 4-/5 to 4+5 muscle strength at the bilateral wrists extension; 4-/5 to 4+5 muscle strength at the bilateral fingers flexion, extension and abduction; 3-/5 to 3+5 muscle strength at the right hip flexion; 3-/5 muscle strength at the left hip flexion; 3+/5 muscle strength at the bilateral knee flexion; 4+/5 muscle strength at the bilateral knee extension; 5/5 muscle strength at the bilateral ankle plantarflexion's; 4+/5 to 5/5 muscle strength at the bilateral ankle dorsiflexion's   Reflex : 0 bilateral biceps, bilateral triceps, bilateral brachioradialis, bilateral knees and bilateral ankles reflexes   Pathological Reflex :  No Roberta's sign ; no Babinski sign ; no ankle clonus  Gait : Not assessed      Diagnostics:  Recent Results (from the past 24 hour(s))   POCT glucose    Collection Time: 01/13/22  4:30 PM   Result Value Ref Range    POC Glucose 190 (H) 70 - 108 mg/dl   POCT glucose    Collection Time: 01/13/22  7:46 PM   Result Value Ref Range    POC Glucose 223 (H) 70 - 108 mg/dl   POCT glucose    Collection Time: 01/14/22  8:12 AM   Result Value Ref Range    POC Glucose 217 (H) 70 - 108 mg/dl   POCT glucose    Collection Time: 01/14/22 12:20 PM   Result Value Ref Range    POC Glucose 132 (H) 70 - 108 mg/dl     Results for Leeanna Hebert (MRN [id number]) as of 1/14/2022 14:02   Ref.  Range 1/12/2022 07:21   Sodium Latest Ref Range: 135 - 145 meq/L 135   Potassium Latest Ref Range: 3.5 - 5.2 meq/L 4.7   Chloride Latest Ref Range: 98 - 111 meq/L 102   CO2 Latest Ref Range: 23 - 33 meq/L 23   BUN Latest Ref Range: 7 - 22 mg/dL 8   Creatinine Latest Ref Range: 0.4 - 1.2 mg/dL 0.5   Anion Gap Latest Ref Range: 8.0 - 16.0 meq/L 10.0   Est, Glom Filt Rate Latest Units: ml/min/1.73m2 >90   Glucose Latest Ref Range: 70 - 108 mg/dL 186 (H)   CALCIUM, SERUM, [id number] Latest Ref Range: 8.5 - 10.5 mg/dL 8.5       Results for Patrizia Carmona (MRN [id number]) as of 1/14/2022 14:02   Ref. Range 1/12/2022 07:21   WBC Latest Ref Range: 4.8 - 10.8 thou/mm3 5.6   RBC Latest Ref Range: 4.20 - 5.40 mill/mm3 3.98 (L)   Hemoglobin Quant Latest Ref Range: 12.0 - 16.0 gm/dl 12.0   Hematocrit Latest Ref Range: 37.0 - 47.0 % 36.9 (L)   MCV Latest Ref Range: 81.0 - 99.0 fL 92.7   MCH Latest Ref Range: 26.0 - 33.0 pg 30.2   MCHC Latest Ref Range: 32.2 - 35.5 gm/dl 32.5   MPV Latest Ref Range: 9.4 - 12.4 fL 10.2   RDW-CV Latest Ref Range: 11.5 - 14.5 % 14.7 (H)   RDW-SD Latest Ref Range: 35.0 - 45.0 fL 46.2 (H)   Platelet Count Latest Ref Range: 130 - 400 thou/mm3 112 (L)     Results for Patrizia Carmona (MRN [id number]) as of 1/14/2022 14:02   Ref. Range 12/21/2021 03:37   Hemoglobin A1C Latest Ref Range: 4.4 - 6.4 % 9.9 (H)       Respiratory culture (12/31/2021) :  Component 12/31/21 1114   Respiratory Culture  Abnormal   Endotracheal tube cultures do not correlate well with chest x-ray results and are a poor predictor of true pulmonary infections. At least one of drugs in current antibiotic therapy is ineffective in vitro for isolates. Gram Stain Result Few segmented neutrophils observed. Rare epithelial cells observed. No bacteria seen.      Organism Klebsiella oxytoca Abnormal     Respiratory Culture light growth     Organism Candida albicans Abnormal     Respiratory Culture light growth    Susceptibility   Klebsiella oxytoca     BACTERIAL SUSCEPTIBILITY PANEL BY DOMO     amoxicillin-clavulanate 4 mcg/mL Sensitive     ampicillin >=32 mcg/mL Resistant     cefOXitin <=4 mcg/mL Sensitive     cefTRIAXone <=1 mcg/mL Sensitive     gentamicin <=1 mcg/mL Sensitive piperacillin-tazobactam <=4 mcg/mL Sensitive     tetracycline <=1 mcg/mL Sensitive     trimethoprim-sulfamethoxazole <=20 mcg/mL Sensitive          Portable chest x-ray (12/18/2021) : Impression   1. Diffuse groundglass opacification of both lungs with areas of interstitial thickening and nodular consolidative opacities. Findings likely relate to multilobar pneumonia. Pulmonary edema could also give this appearance. CTA of chest with & without contrast (12/18/2021) : Impression   1. No evidence of pulmonary embolism. 2. Areas of abnormal density throughout both lung fields consistent with inflammatory process, presumably Covid 19 infection. 3. Mild cardiomegaly. 4. Mild dilatation of the ascending aorta. 5. Thoracic spondylosis. 6. Hiatal hernia. 7. Hepatosplenomegaly. Portable chest x-ray (12/20/2021) : Impression   1. Marked worsening of the diffuse airspace opacities relating to multilobar pneumonia. Left lower extremity arterial duplex study (12/30/2021) : Impression   No significant stenosis or occlusion. Modified barium swallowing study (1/4/2022) : Impression   Laryngeal penetration without aspiration with thin liquids. Recommendations and comments available from speech therapy       CT of abdomen & pelvis with IV contrast (1/10/2022) : Impression   1. There is a segment of colonic wall thickening involving the sigmoid colon as well as the descending colon with pericolonic strandy opacity and diverticular disease along the sigmoid colon (most consistent with diverticulitis. There is a small amount of gas within the urinary bladder empty dependently on axial image 70 which is nonspecific but could be related to recent catheterization. Correlate clinically. There is no gross evidence of oral contrast within the urinary bladder on the current examination.    2. Limited evaluation of the lung bases demonstrates mild dependent bibasilar opacities which may represent mild atelectasis or pneumonia. Small bilateral pleural effusions are seen. 3. There is a trace amount of ascitic fluid in the perihepatic and perisplenic regions   4. No acute osseous findings are seen. Lower lumbar facet arthrosis is demonstrated. Echocardiogram (12/18/2021) :   Conclusions Summary   Technically difficult study due to poor acoustic windows. Left ventricle size is normal.   Normal left ventricular wall thickness. There was severe global hypokinesis of the left ventricle. Systolic function was severely reduced. Ejection fraction is visually estimated in the range of 30% to 35%. The post admission physician evaluation (EDEN) is consistent with the pre-admission assessment. See above findings to reflect the elements required in the EDEN. Patient's admitting condition is consistent with the findings of the preadmission assessment by the rehabilitation admissions coordinator.     Ree Ojeda MD

## 2022-01-14 NOTE — DISCHARGE SUMMARY
Discharge Summary    Patient:  Stevenson Smith  YOB: 1950    MRN: [id number]   Acct: [de-identified]    Primary Care Physician: Devonte Odom    Admit date:  12/18/2021    Discharge date:   1/14/2022       Discharge Diagnoses:   <principal problem not specified>  Active Problems:    COVID-19    Hypoxia    Acute respiratory failure with hypoxia ([address]  Resolved Problems:    * No resolved hospital problems. *        Admitted for: (HPI)  Stevenson Smith is a 73 y.o. female with PMHx of CAD, essential hypertension, type II diabetes, who presents to [address] [place] [address] with shortness of breath. Patient states she began having symptoms on 12/12 of loss of appetite, changes in taste and smell, food aversions, nausea with dry heaves and diarrhea. she originally attributed these symptoms to changes in her diabetic medications. As symptoms persisted, patient states she was advised by her PCP to go to ED at [facility] for workup and found to be COVID positive with hypoxia 70% spo2. She decided to leave AMA per their recommendation she be admitted to their facility as she preferred to be admitted to [address] [place] [address] for treatment and family close by. She states she has felt okay in spite of measured hypoxia. She denies fevers, chills, chest pain, dizziness, numbness or tingling, abdominal pain     Hospital Course:  72-year-old female who was admitted for acute hypoxemic respiratory failure secondary to COVID-19 infection. During her hospitalization patient's respiratory status worsened, she was on heated high flow followed by BiPAP. Patient was eventually intubated for severe ARDS on 12/20/2021 and extubated on 1/2/2022. Patient was transferred out to stepdown on 3 L nasal cannula. Patient completed course of dexamethasone and baricitinib. Patient continues to slowly improve and was eventually weaned off of oxygen completely.   She was working with PT/OT for significant debility and weakness and is being discharged today to inpatient rehab for further rehabilitation and therapy. She has cardiomyopathy with ejection fraction 30 to 35%, no acute exacerbation. Follow-up with PCP once discharged from rehab.         Consultants:  Patient Care Team:  Maricruz Esteban as PCP - General    Discharge Medications:       Medication List      START taking these medications    albuterol sulfate  (90 Base) MCG/ACT inhaler  Inhale 2 puffs into the lungs every 6 hours as needed for Wheezing     fluconazole 200 MG tablet  Commonly known as: DIFLUCAN  Take 1 tablet by mouth daily for 7 days        CHANGE how you take these medications    hydrOXYzine 25 MG capsule  Commonly known as: VISTARIL  Take 1 capsule by mouth 4 times daily as needed for Anxiety  What changed: reasons to take this        CONTINUE taking these medications    aspirin 81 MG chewable tablet     Cholecalciferol 50 MCG (2000 UT) Tabs     famotidine 20 MG tablet  Commonly known as: PEPCID     Farxiga 10 MG tablet  Generic drug: dapagliflozin     glipiZIDE 10 MG extended release tablet  Commonly known as: GLUCOTROL XL     Januvia 100 MG tablet  Generic drug: SITagliptin     nitroGLYCERIN 0.4 MG SL tablet  Commonly known as: NITROSTAT     rosuvastatin 10 MG tablet  Commonly known as: CRESTOR     vitamin C 1000 MG tablet     Zinc Sulfate 110 MG Tabs        STOP taking these medications    azithromycin 250 MG tablet  Commonly known as: ZITHROMAX     dexamethasone 6 MG tablet  Commonly known as: DECADRON           Where to Get Your Medications      These medications were sent to [facility] [address], [address] [facility]  [address] [place] [address]  [address] [place] [address] [facility] [address]    Phone: [phone]   · albuterol sulfate  (90 Base) MCG/ACT inhaler     Information about where to get these medications is not yet available    Ask your nurse or doctor about these medications  · fluconazole 200 MG tablet  · hydrOXYzine 25 MG capsule           Physical Exam:    Vitals:  Vitals:    01/13/22 0611 01/13/22 0903 01/14/22 0634 01/14/22 1042   BP: 113/65 113/65  (!) 131/91   Pulse: 73 75  98   Resp: 16 18  18   Temp: 98.1 °F (36.7 °C)   98.3 °F (36.8 °C)   TempSrc: Oral   Oral   SpO2: 94% 95%  95%   Weight:   190 lb 12.8 oz (86.5 kg)    Height:         Weight: Weight: 190 lb 12.8 oz (86.5 kg)     24 hour intake/output:    Intake/Output Summary (Last 24 hours) at 1/14/2022 1312  Last data filed at 1/14/2022 4670  Gross per 24 hour   Intake --   Output 1000 ml   Net -1000 ml       General appearance - alert awake appears to be in no acute distress  Chest - Bilateral air entry, no wheeze  Heart - S1S2 RRR, no murmurs or gallops  Abdomen - soft, non tender, normoactive bowel sounds  Neurological - non focal  Extremities - no edema  Skin - no rashes or lesions    Procedures:  na    Diagnostic Test:  na    Radiology reports as per the Radiologist  Radiology:  ECHO Complete 2D W Doppler W Color    Result Date: 12/21/2021  Transthoracic Echocardiography Report (TTE)  Demographics   Patient Name   Marilee Alexander Gender              Female   MR #           [id number]     Race                                                Ethnicity   Account #      [de-identified]     Room Number         0007   Accession      [id number]    Date of Study       12/21/2021  Number   Date of Birth  1950    Referring Physician Christina Anderson CNP                                                   [facility]   Age            73 year(s)    [address] [facility]                                Interpreting        Echo reader of the week                               Physician           Marlee Leal MD  Procedure Type of Study   TTE procedure:ECHOCARDIOGRAM COMPLETE 2D W DOPPLER W COLOR.   Procedure Date Date: 12/21/2021 Start: 03:40 PM Study Location: Bedside Technical Quality: Limited visualization due to patient on ventilator. Indications:Hypoxia. Additional Medical History:Diabetes, Coronary artery disease, Hypertension, Hypoxia, Respiratory failure Patient Status: Routine Height: 62 inches Weight: 206 pounds BSA: 1.94 m^2 BMI: 37.68 kg/m^2 BP: 105/71 mmHg  Conclusions   Summary  Technically difficult study due to poor acoustic windows. Left ventricle size is normal.  Normal left ventricular wall thickness. There was severe global hypokinesis of the left ventricle. Systolic function was severely reduced. Ejection fraction is visually estimated in the range of 30% to 35%. Signature   ----------------------------------------------------------------  Electronically signed by Travis Bowers MD (Interpreting  physician) on 12/21/2021 at 07:49 PM  ----------------------------------------------------------------   Findings   Mitral Valve  The mitral valve structure was normal with normal leaflet separation. DOPPLER: The transmitral velocity was within the normal range with no  evidence for mitral stenosis. There was no evidence of mitral  regurgitation. Aortic Valve  The aortic valve was trileaflet with normal thickness and cuspal  separation. DOPPLER: Transaortic velocity was within the normal range with  no evidence of aortic stenosis. There was no evidence of aortic  regurgitation. Tricuspid Valve  The tricuspid valve structure was normal with normal leaflet separation. DOPPLER: There was no evidence of tricuspid stenosis. Mild tricuspid  regurgitation visualized. Pulmonic Valve  The pulmonic valve leaflets exhibited normal thickness, no calcification,  and normal cuspal separation. DOPPLER: The transpulmonic velocity was  within the normal range with no evidence for regurgitation. Left Atrium  Left atrial size was normal.   Left Ventricle  Left ventricle size is normal.  Normal left ventricular wall thickness.   There was severe global hypokinesis of the left ventricle. Systolic function was severely reduced. Ejection fraction is visually estimated in the range of 30% to 35%. Right Atrium  Right atrial size was normal.   Right Ventricle  The right ventricular size was normal with normal systolic function and  wall thickness. Pericardial Effusion  The pericardium was normal in appearance with no evidence of a pericardial  effusion. Pleural Effusion  No evidence of pleural effusion. Aorta / Great Vessels  -Aortic root dimension within normal limits.  -The Pulmonary artery is within normal limits. -IVC size is within normal limits with normal respiratory phasic changes.   M-Mode/2D Measurements & Calculations   LV Diastolic    LV Systolic Dimension: 4.2  AV Cusp Separation: 1.8 cmLA  Dimension: 5.1  cm                          Dimension: 3.2 cmAO Root  cm              LV Volume Diastolic: 278 ml Dimension: 3.1 cm  LV FS:17.7 %    LV Volume Systolic: 08.1 ml  LV PW           LV EDV/LV EDV Index: 620  Diastolic: 1.1  GL/71 P^3KK ESV/LV ESV  cm              Index: 58.1 ml/30 m^2       RV Diastolic Dimension: 1.7 cm  Septum          EF Calculated: 43 %  Diastolic: 0.9                              LA/Aorta: 1.03  cm  Doppler Measurements & Calculations   MV Peak E-Wave: 77.6 cm/s AV Peak Velocity: 121 LVOT Peak Velocity: 90.1  MV Peak A-Wave: 84.9 cm/s cm/s                  cm/s  MV E/A Ratio: 0.91        AV Peak Gradient:     LVOT Peak Gradient: 3 mmHg  MV Peak Gradient: 2.41    5.86 mmHg  mmHg                                            TV Peak E-Wave: 44.5 cm/s                                                  TV Peak A-Wave: 48.2 cm/s  MV Deceleration Time: 232  msec                                            TV Peak Gradient: 0.79  MV P1/2t: 68 msec                               mmHg  MVA by PHT:3.24 cm^2                            TR Velocity:209 cm/s                                                  TR Gradient:17.47 mmHg  MV E' Septal Velocity:    AV DVI (Vmax):0.74    PV Peak Velocity: 83.3  5.1 cm/s                                        cm/s  MV A' Septal Velocity:                          PV Peak Gradient: 2.78  6.3 cm/s                                        mmHg  MV E' Lateral Velocity: 4  cm/s  MV A' Lateral Velocity:  5.8 cm/s  E/E' septal: 15.22  E/E' lateral: 19.4  [email or web address] [id number]    CTA CHEST W WO CONTRAST    Result Date: 12/18/2021  PROCEDURE: CTA CHEST W WO CONTRAST CLINICAL INFORMATION: shortness of breath. COMPARISON: No prior study. TECHNIQUE: 3 mm axial images were obtained through the chest after the administration of IV contrast.  A non-contrast localizer was obtained. 3D reconstructions were performed on the scanner to include MIP coronal and sagittal images through the chest. Isovue was the intravenous contrast utilized. All CT scans at this facility use dose modulation, iterative reconstruction, and/or weight-based dosing when appropriate to reduce radiation dose to as low as reasonably achievable. FINDINGS: There is adequate opacification of the pulmonary arterial system. No pulmonary emboli are present. There is atherosclerotic calcification in the aortic arch. . There is mild dilatation of the ascending aorta which measures 3.1 cm in diameter. There is mild cardiomegaly. There is no pericardial or pleural effusion. There is no mediastinal, axillary or hilar adenopathy. There are areas of abnormal density throughout both lung fields consistent with inflammatory process, presumably Covid 19 infection. . There is thoracic spondylosis. .  There is hepatosplenomegaly. .     1. No evidence of pulmonary embolism. 2. Areas of abnormal density throughout both lung fields consistent with inflammatory process, presumably Covid 19 infection. 3. Mild cardiomegaly. 4. Mild dilatation of the ascending aorta. 5. Thoracic spondylosis.  6. descending colon with pericolonic strandy opacity and diverticular disease along the sigmoid colon (most consistent with diverticulitis. There is a small amount of gas within the urinary bladder empty dependently on axial image 70 which is nonspecific but could be related to recent catheterization. Correlate clinically. There is no gross evidence of oral contrast within the urinary bladder on the current examination. 2. Limited evaluation of the lung bases demonstrates mild dependent bibasilar opacities which may represent mild atelectasis or pneumonia. Small bilateral pleural effusions are seen. 3. There is a trace amount of ascitic fluid in the perihepatic and perisplenic regions No acute osseous findings are seen. Lower lumbar facet arthrosis is demonstrated. **This report has been created using voice recognition software. It may contain minor errors which are inherent in voice recognition technology. ** Final report electronically signed by Dr. Yoly Christianson on 1/10/2022 12:13 PM    VL DUP LOWER EXTREMITY ARTERIES LEFT    Result Date: 12/30/2021  PROCEDURE: VL DUP LOWER EXTREMITY ARTERIES LEFT CLINICAL INFORMATION: no pulse cool ext . TECHNIQUE: Diallo Bolster scale color and spectral duplex imaging of the right lower extremity arterial system COMPARISON: No prior study. FINDINGS: Triphasic waveforms are maintained throughout the right lower extremity. There is no elevated flow velocities to suggest significant stenosis. Color Doppler flow is seen throughout the right lower terminate arterial system. Bilateral ABIs were obtained and they are normal bilaterally. LEFT ARTERY (PSV cm/sec) ? ??????????????????????? CFA ---------------> 93 PSV cm/sec PROF -------------> 56 PSV cm/sec SFA PROX ------->78 PSV cm/sec SFA MID ---------->67 PSV cm/sec SFA DIST -------->67 PSV cm/sec POP A PROX --->51 PSV cm/sec POP A DIST ---->45 PSV cm/sec PTA --------------->40 PSV cm/sec PERONEAL ----->19 PSV cm/sec CHERELLE ----------------> 27 PSV cm/sec NISHA RIGHT CHERELLE----->1.05 PTA----->1.01 NISHA LEFT CHERELLE----->0.96 PTA----->1.12     No significant stenosis or occlusion. **This report has been created using voice recognition software. It may contain minor errors which are inherent in voice recognition technology. ** Final report electronically signed by Dr. Michelle Hurst on 12/30/2021 3:17 PM    XR CHEST PORTABLE    Result Date: 1/1/2022  PROCEDURE: XR CHEST PORTABLE CLINICAL INFORMATION: acute resp failure. COVID 19 positive. COMPARISON: No prior study. TECHNIQUE: AP supine view of the chest. FINDINGS: The endotracheal tube tip is 4.6 cm above the telma. There is an esophageal route tube in place. The tip is in the stomach. A right-sided PICC line tip is in the distal SVC. The heart size is normal.The mediastinum is not widened. There are mild bilateral patchy opacities. These are unchanged. There is no pneumothorax. The pulmonary vascularity is normal. There are degenerative changes in the spine. Stable mild bilateral patchy pulmonary opacities. **This report has been created using voice recognition software. It may contain minor errors which are inherent in voice recognition technology. ** Final report electronically signed by Dr. Nallely Sorensen on 1/1/2022 7:25 AM    XR CHEST PORTABLE    Result Date: 12/28/2021  1 view chest x-ray Comparison: December 25, 2021 Findings: ET tube, enteric tube, right PICC line are all in stable position when compared to the prior examination. Interstitial densities involving the right lung base and also the periphery of the left lung base similar to the prior examination. Findings are grossly unchanged when compared to the December 25, 2001 exam. The heart size is unchanged from prior examination. No acute fracture. No interval change. This document has been electronically signed by: Frantz Kahn DO on 12/28/2021 02:39 AM    XR CHEST PORTABLE    Result Date: 12/25/2021  1 view chest x-ray.  Comparison: CR,SR - XR CHEST PORTABLE - 12/24/2021 01:46 AM EST Findings: Tubes and lines appear unchanged. Patchy bilateral consolidations, moderate left-sided improvement. Trace right pleural effusion. No pneumothorax. Persistent normal heart size. No pulmonary edema. Impression: Bilateral consolidations. Improving left-sided infiltrates. This document has been electronically signed by: Juan Calderon MD on 12/25/2021 05:09 AM    XR CHEST PORTABLE    Result Date: 12/24/2021  1 view chest x-ray Comparison: None Findings: ET tube is 4.4 cm superior to the telma. Enteric tube with distal tip below the field of view of current examination, subdiaphragmatic. Right PICC line with distal tip overlying expected location of the superior vena cava Diffuse infiltrates and airspace disease throughout the bilateral lungs. No pneumothorax. Cardiomegaly with retrocardiac densities. No acute fracture. Spondylitic change of the thoracic spine. 1. Support lines and tubes as outlined above, ET tube is 4.4 cm superior to the telma. 2. Diffuse scattered infiltrates and airspace disease throughout the bilateral lungs. More complex consolidation involving the right lung base. This document has been electronically signed by: Estrada Bhandari DO on 12/24/2021 03:04 AM    XR CHEST PORTABLE    Result Date: 12/21/2021  PROCEDURE: XR CHEST PORTABLE CLINICAL INFORMATION: confirm OG tube placement. COMPARISON: Chest radiograph dated 12/20/2021. TECHNIQUE: AP upright view of the chest. FINDINGS: There is an endotracheal tube in the distal thoracic trachea, stable compared to prior exam. There is a stable enteric tube which courses below the diaphragm, projecting over the stomach with tip off the edge of the radiograph. There has been interval placement of a right-sided PICC line with catheter tip at the cavoatrial junction. No pneumothorax is identified. There is stable diffuse patchy opacities in both lungs. The cardiomediastinal silhouette is stable.      Interval placement of right-sided PICC line with catheter tip at the cavoatrial junction with stable diffuse patchy lung opacities bilaterally. **This report has been created using voice recognition software. It may contain minor errors which are inherent in voice recognition technology. ** Final report electronically signed by Dr. Jad Nava MD on 12/21/2021 7:10 PM    XR CHEST PORTABLE    Result Date: 12/20/2021  PROCEDURE: XR CHEST PORTABLE CLINICAL INFORMATION: to vert ETT anf OG tube placement. COMPARISON: Chest radiograph from the same date at 11:08 AM TECHNIQUE: AP upright view of the chest. FINDINGS: There has been interval placement of an endotracheal tube with tip in the distal third of the trachea approximately 3 cm from the telma. There is been interval placement of an enteric tube which courses below the diaphragm, projecting over the stomach, with tip off the edge of the radiograph. There are diffuse patchy lung opacities, stable compared to prior exam. The cardiomediastinal silhouette is stable. There are stable degenerative changes of the spine. Diffuse patchy lung opacities as evidence for diffuse pneumonia or ARDS following intubation. **This report has been created using voice recognition software. It may contain minor errors which are inherent in voice recognition technology. ** Final report electronically signed by Dr. Jad Nava MD on 12/20/2021 7:21 PM    XR CHEST PORTABLE    Result Date: 12/20/2021  PROCEDURE: XR CHEST PORTABLE CLINICAL INFORMATION: C 19; increasing 02 needs COMPARISON: Chest radiograph 12/18/2021 TECHNIQUE: AP portable chest radiograph performed. FINDINGS: There has been marked worsening of diffuse airspace opacities. Cardiac silhouette is mildly enlarged. No pleural effusion. No pneumothorax. No acute bony abnormality. Degenerative changes of the thoracic spine. 1. Marked worsening of the diffuse airspace opacities relating to multilobar pneumonia.  **This report has been created using voice recognition software. It may contain minor errors which are inherent in voice recognition technology. ** Final report electronically signed by Dr Lakia Kauffman on 12/20/2021 11:36 AM    XR CHEST PORTABLE    Result Date: 12/18/2021  PROCEDURE: XR CHEST PORTABLE CLINICAL INFORMATION: SOB COMPARISON: None TECHNIQUE: AP portable chest radiograph performed. FINDINGS: Diffuse groundglass opacification of both lungs with areas of interstitial thickening and nodular consolidative opacities. Cardiac silhouette is mildly enlarged. No pleural effusion. No pneumothorax. No acute bony abnormality. Degenerative changes of the thoracic spine. 1. Diffuse groundglass opacification of both lungs with areas of interstitial thickening and nodular consolidative opacities. Findings likely relate to multilobar pneumonia. Pulmonary edema could also give this appearance. **This report has been created using voice recognition software. It may contain minor errors which are inherent in voice recognition technology. ** Final report electronically signed by Dr Lakia Kauffman on 12/18/2021 2:20 PM    FL MODIFIED BARIUM SWALLOW W VIDEO    Result Date: 1/4/2022  PROCEDURE: FL MODIFIED BARIUM SWALLOW W VIDEO CLINICAL INFORMATION: Evaluate swallow per Speech and Language Pathology . TECHNIQUE: Modified barium swallow was performed in radiology by speech therapist. Peder Fus fluoroscopy was provided for imaging in the lateral projection. The patient was administered barium of various consistencies. The radiologist was available for the entire exam. Images: 19 Fluoroscopy time: 2 minutes 20 seconds COMPARISON: No prior study. FINDINGS: Laryngeal penetration with thin liquids no aspiration. No laryngeal penetration with other tested barium. No aspiration with any tested consistency. Laryngeal penetration without aspiration with thin liquids.  Recommendations and comments available from speech therapy **This report has been created using voice recognition software. It may contain minor errors which are inherent in voice recognition technology. ** Final report electronically signed by Dr. Chance Noble on 1/4/2022 11:34 AM        Diet:  ADULT DIET;  Dysphagia - Soft and Bite Sized  ADULT ORAL NUTRITION SUPPLEMENT; Breakfast, Lunch, Dinner; Diabetic Oral Supplement    Activity:  Activity as tolerated (Patient may move about with assist as indicated or with supervision.)    Follow-up:  in the next few days with Hayder William    Disposition: Inpatient rehab    Condition: Stable      Time Spent: 35 minutes    Electronically signed by Heather Benjamin MD on 1/14/2022 at 1:12 PM    Discharging Hospitalist

## 2022-01-14 NOTE — PROGRESS NOTES
Physical Medicine & Rehabilitation Progress Note    Chief Complaint:  Fatigue and tired    Subjective:    Cr Maier is a 73 y.o. right-handed obese  female with history of hypertension, diabetes mellitus type 2, coronary artery disease requiring coronary artery stenting, status post bilateral eyes cataract surgeries, status post 3  sections, status post hysterectomy, status post hiatal hernia repair, status post sinus surgery, was admitted on 2022 for intensive inpatient management of impairment & disability secondary to critical illness myopathy and disability/physical decondition as result of COVID-19 pneumonia. The patient says he does not quite remember how she ended up in the hospital.     The patient apparently began experiencing reduced upper size, change in taste and smell, shortness of breath, nausea, dry heaves, and diarrhea starting in 2021. The symptom progressively worsened. She was advised by her PCP to visit ER. Therefore she went to [facility] ER on 2021. She was found to be hypoxic with O2 saturation in 70s% in room air. COVID test was performed which turned out to be positive. She was advised to be admitted but she left against medical advice because she preferred to be admitted to [facility] instead. On 2021 she came to [facility] ER with complaint of shortness of breath. She was found to have oxygen saturation in the 80s% in room air. She was admitted with diagnosis of acute hypoxic respiratory failure secondary to COVID-19 pneumonia. She was treated with Decadron and Olumiant (baricitnib). However her condition deteriorated despite of treatment. She was intubated on 2021 and was admitted to ICU. Her hospital course also was complicated by superimposed Klebsiella oxytocin bacterial pneumonia, bradycardia requiring dopamine infusion, and decubitus ulcer. Her condition later stabilized and improved. She was extubated on 1/2/2022. Patient states that she feels well today. She had a good night sleep last night. She denies feeling any pain this morning. She also denies having shortness of breath, cough, sore throat, or weakness. She says she still has some degree of fatigue. She says she has no numbness or tingling sensation. Her blood glucose level still elevated most of the time. Her current diet is regular diet. She says at home she took glipizide twice daily with meal but she does not remember the dosage. She says metformin had been tried few times in the past but only caused significant diarrhea. She does not remember other diabetes medication she was on at home. She is starting the intensive inpatient rehabilitation treatment today. Rehabilitation:  PT: Reviewed. Initial evaluation in progress and pending    OT: Reviewed. Initial evaluation in progress and pending    ST: Reviewed. Initial evaluation in progress and pending      Review of Systems:  Review of Systems   Constitutional: Positive for fatigue. Negative for chills, diaphoresis and fever. HENT: Negative for hearing loss, rhinorrhea, sneezing, sore throat and trouble swallowing. Eyes: Negative for visual disturbance. Respiratory: Negative for cough, shortness of breath and wheezing. Cardiovascular: Negative for chest pain and palpitations. Gastrointestinal: Negative for abdominal pain, constipation, diarrhea, nausea and vomiting. Genitourinary: Negative for dysuria. Musculoskeletal: Positive for gait problem. Negative for arthralgias, back pain, myalgias and neck pain. Skin: Negative for rash. Neurological: Negative for dizziness, tremors, weakness, light-headedness, numbness and headaches. Psychiatric/Behavioral: Negative for dysphoric mood, hallucinations and sleep disturbance. The patient is not nervous/anxious.          Objective:  /71   Pulse 75   Temp 98.3 °F (36.8 °C) (Oral)   Resp 18 Ht 5' 2\" (1.575 m)   Wt 190 lb (86.2 kg)   LMP  (LMP Unknown)   SpO2 90%   Breastfeeding No   BMI 34.75 kg/m²   Physical Exam   General:  well-developed, well nourished obese  female ; in no acute distress ; appropriate affect & mood; sitting on reclining chair comfortably  Eyes: pupil equally round ; extra-ocular motion intact bilaterally  Head, Ear, Nose, Mouth & Throat : normocephalic ; no discharge from ears or nose ; no deformity ; no facial swelling ; oral mucosa pink   Neck :  supple ; no tenderness ; no muscle spasm  Cardiovascular : regular rate & rhythm ; normal S1 & S2 heart sound ; no murmur ; normal peripheral pulse at bilateral upper & lower extremities  Pulmonary : Breath sounds present at bilateral lung fields; no wheezing ; no rale; no crackle  Gastrointestinal : soft, protruded abdomen without tenderness ; normal bowel sound present   Back : no tenderness; no muscle spasm  Skin: no skin lesion or rash ; no pitting edema at all 4 extremities  Musculoskeletal : no limb asymmetry; no limb deformity; no tenderness at bilateral upper & lower extremities; no palpable mass at limbs ; no joints laxity or crepitation ; hip flexion passive ROM reaching 90 degrees bilaterally; ankle dorsiflexion passive ROM reaching 5 degrees bilaterally; otherwise normal functional joints ROM at the rest of bilateral upper & lower extremities  Cerebral :  alert ; awake ; oriented to place, person and time; follow two-step verbal command  Cerebellum : no dysmetria with bilateral finger-to-nose test ; unable to perform  heel-to-shin test on both sides  Cranial Nerves :  grossly intact CN II to XII function  Sensory : intact light touch and pin prick sensation at bilateral upper & lower extremities  Motor : normal tone at bilateral upper & lower extremities ; 4-/5 to 4+/5 muscle strength at  right shoulder flexion & abduction ; 4+/5 muscle strength at the left shoulder flexion and abduction; 4+/5 to 5/5 muscle strength at the right elbow flexion and extension; 4+5 to 5/5 muscle strength at left wrist extension; 4-/5 to 4+5 muscle strength at the left fingers flexion, extension and abduction; 4+5 muscle strength at the right finger abduction; 3-/5 to 3+5 muscle strength at the right hip flexion; 3+/5 to 4-/5 muscle strength at the left hip flexion; 3+/5 muscle strength at the right knee flexion; 3+5 to 4-/5 muscle strength at the left knee flexion; 4+/5 to 5/5 muscle strength at the bilateral knee extension; otherwise 5/5 muscle strength at the rest of bilateral upper and the lower extremities  Reflex : 0 bilateral biceps, bilateral brachioradialis and bilateral knees reflexes   Pathological Reflex :  No Roberta's sign ; no ankle clonus  Gait : Not assessed      Diagnostics:   Recent Results (from the past 24 hour(s))   POCT glucose    Collection Time: 01/14/22 12:20 PM   Result Value Ref Range    POC Glucose 132 (H) 70 - 108 mg/dl   POCT glucose    Collection Time: 01/14/22  5:09 PM   Result Value Ref Range    POC Glucose 213 (H) 70 - 108 mg/dl   POCT glucose    Collection Time: 01/14/22  8:48 PM   Result Value Ref Range    POC Glucose 231 (H) 70 - 108 mg/dl   POCT glucose    Collection Time: 01/15/22  8:38 AM   Result Value Ref Range    POC Glucose 170 (H) 70 - 108 mg/dl     Results for Christina Boys (MRN [id number]) as of 1/15/2022 08:43   Ref.  Range 1/13/2022 19:46 1/14/2022 08:12 1/14/2022 12:20 1/14/2022 17:09 1/14/2022 20:48   POC Glucose Latest Ref Range: 70 - 108 mg/dl 223 (H) 217 (H) 132 (H) 213 (H) 231 (H)       Impression:  · Critical illness myopathy   · Debility/physical decondition secondary to [id number] pneumonia, complicated by bacterial pneumonia, bradycardia, and decubitus ulcer  · History of COVID-19 pneumonia with acute hypoxic respiratory failure requiring intubation, complicated by Klebsiella oxytocin bacterial pneumonia  · Mild dysphagia/moderate pharyngeal dysphagia  · Mild cognitive impairment  · Hypertension  · Diabetes mellitus type 2  · History of coronary artery disease requiring coronary artery stenting  · Severe cardiomyopathy with reduced ejection fraction  · Obesity    The patient's condition is stable. Her blood glucose level is elevated most of the time. I will reduce carbohydrate content in her diet and restart her on glipizide twice daily begins with 5 mg. She is starting the intensive inpatient rehabilitation treatment today. Plan:  · Continue intensive PT/OT/SLP/RT inpatient rehabilitation program at least 3 hours per day, 5 days per week in order to improve functional status prior to discharge. Family education and training will be completed. Equipment evaluations and recommendations will be completed as appropriate. · Rehabilitation nursing continue to be involved for bowel, bladder, skin, and pain management. Nursing will also provide education and training to patient and family. · Prophylaxis:  DVT: Lovenox, KENA stocking, intermittent pneumatic compression device. GI: Colace, FiberCon, Dulcolax suppository as needed, milk of magnesium as needed, GlycoLax as needed, Senokot as needed. · Pain: Tylenol as needed  · Continue aspirin, Crestor for coronary artery disease  · Continue Pepcid for gastric protection  · Continue Lasix for hypertension  · Continue Crestor for hyperlipidemia  · Continue Lantus insulin and Humalog insulin coverage for diabetes mellitus; restart glipizide begins with 5 mg twice daily with meal  · Continue albuterol inhaler as needed for COVID-19 pneumonia  · Melatonin as needed for insomnia  · Nutrition:   Reduced diet carbohydrate content to 60 g. Consultation to dietician for nutritional counseling and recommendations. Prealbumin will be checked on admission.    · Bladder: Monitoring signs or symptoms of UTI  · Bowel: Monitoring signs or symptoms of constipation  ·  and case management consultations for coordination of care and discharge planning      Missed Therapy Time:  · None    Leonid Gaxiola MD

## 2022-01-14 NOTE — PLAN OF CARE
Problem: Non-Violent Restraints  Goal: Removal from restraints as soon as assessed to be safe  Outcome: Completed  Goal: No harm/injury to patient while restraints in use  Outcome: Completed  Goal: Patient's dignity will be maintained  Outcome: Completed  Pt has no restraints at this time.

## 2022-01-15 LAB
ALBUMIN SERPL-MCNC: 3.3 G/DL (ref 3.5–5.1)
ALP BLD-CCNC: 78 U/L (ref 38–126)
ALT SERPL-CCNC: 36 U/L (ref 11–66)
ANION GAP SERPL CALCULATED.3IONS-SCNC: 9 MEQ/L (ref 8–16)
ANISOCYTOSIS: PRESENT
AST SERPL-CCNC: 30 U/L (ref 5–40)
ATYPICAL LYMPHOCYTES: ABNORMAL %
BASOPHILIA: ABNORMAL
BASOPHILS # BLD: 0.3 %
BASOPHILS ABSOLUTE: 0 THOU/MM3 (ref 0–0.1)
BILIRUB SERPL-MCNC: 0.5 MG/DL (ref 0.3–1.2)
BUN BLDV-MCNC: 4 MG/DL (ref 7–22)
CALCIUM SERPL-MCNC: 8.8 MG/DL (ref 8.5–10.5)
CHLORIDE BLD-SCNC: 102 MEQ/L (ref 98–111)
CO2: 28 MEQ/L (ref 23–33)
CREAT SERPL-MCNC: 0.4 MG/DL (ref 0.4–1.2)
EOSINOPHIL # BLD: 1.6 %
EOSINOPHILS ABSOLUTE: 0.1 THOU/MM3 (ref 0–0.4)
ERYTHROCYTE [DISTWIDTH] IN BLOOD BY AUTOMATED COUNT: 15.5 % (ref 11.5–14.5)
ERYTHROCYTE [DISTWIDTH] IN BLOOD BY AUTOMATED COUNT: 48.7 FL (ref 35–45)
GFR SERPL CREATININE-BSD FRML MDRD: > 90 ML/MIN/1.73M2
GLUCOSE BLD-MCNC: 134 MG/DL (ref 70–108)
GLUCOSE BLD-MCNC: 170 MG/DL (ref 70–108)
GLUCOSE BLD-MCNC: 174 MG/DL (ref 70–108)
GLUCOSE BLD-MCNC: 176 MG/DL (ref 70–108)
HCT VFR BLD CALC: 33.3 % (ref 37–47)
HEMOGLOBIN: 10.8 GM/DL (ref 12–16)
IMMATURE GRANS (ABS): 0.04 THOU/MM3 (ref 0–0.07)
IMMATURE GRANULOCYTES: 1.1 %
LYMPHOCYTES # BLD: 31.3 %
LYMPHOCYTES ABSOLUTE: 1.1 THOU/MM3 (ref 1–4.8)
MCH RBC QN AUTO: 30.3 PG (ref 26–33)
MCHC RBC AUTO-ENTMCNC: 32.4 GM/DL (ref 32.2–35.5)
MCV RBC AUTO: 93.5 FL (ref 81–99)
MONOCYTES # BLD: 10.4 %
MONOCYTES ABSOLUTE: 0.4 THOU/MM3 (ref 0.4–1.3)
NUCLEATED RED BLOOD CELLS: 1 /100 WBC
PLATELET # BLD: 95 THOU/MM3 (ref 130–400)
PLATELET ESTIMATE: ABNORMAL
PMV BLD AUTO: 10.4 FL (ref 9.4–12.4)
POTASSIUM REFLEX MAGNESIUM: 3.9 MEQ/L (ref 3.5–5.2)
PREALBUMIN: 23.9 MG/DL (ref 20–40)
RBC # BLD: 3.56 MILL/MM3 (ref 4.2–5.4)
SCAN OF BLOOD SMEAR: NORMAL
SEG NEUTROPHILS: 55.3 %
SEGMENTED NEUTROPHILS ABSOLUTE COUNT: 2 THOU/MM3 (ref 1.8–7.7)
SODIUM BLD-SCNC: 139 MEQ/L (ref 135–145)
TOTAL PROTEIN: 4.9 G/DL (ref 6.1–8)
WBC # BLD: 3.6 THOU/MM3 (ref 4.8–10.8)

## 2022-01-15 PROCEDURE — 85025 COMPLETE CBC W/AUTO DIFF WBC: CPT

## 2022-01-15 PROCEDURE — 92610 EVALUATE SWALLOWING FUNCTION: CPT

## 2022-01-15 PROCEDURE — 97163 PT EVAL HIGH COMPLEX 45 MIN: CPT

## 2022-01-15 PROCEDURE — 97110 THERAPEUTIC EXERCISES: CPT

## 2022-01-15 PROCEDURE — 97535 SELF CARE MNGMENT TRAINING: CPT

## 2022-01-15 PROCEDURE — 6360000002 HC RX W HCPCS: Performed by: PHYSICAL MEDICINE & REHABILITATION

## 2022-01-15 PROCEDURE — 6370000000 HC RX 637 (ALT 250 FOR IP): Performed by: PHYSICAL MEDICINE & REHABILITATION

## 2022-01-15 PROCEDURE — 36415 COLL VENOUS BLD VENIPUNCTURE: CPT

## 2022-01-15 PROCEDURE — 97166 OT EVAL MOD COMPLEX 45 MIN: CPT

## 2022-01-15 PROCEDURE — 99232 SBSQ HOSP IP/OBS MODERATE 35: CPT | Performed by: PHYSICAL MEDICINE & REHABILITATION

## 2022-01-15 PROCEDURE — 82948 REAGENT STRIP/BLOOD GLUCOSE: CPT

## 2022-01-15 PROCEDURE — 97530 THERAPEUTIC ACTIVITIES: CPT

## 2022-01-15 PROCEDURE — 1180000000 HC REHAB R&B

## 2022-01-15 PROCEDURE — 51798 US URINE CAPACITY MEASURE: CPT

## 2022-01-15 PROCEDURE — 84134 ASSAY OF PREALBUMIN: CPT

## 2022-01-15 PROCEDURE — 80053 COMPREHEN METABOLIC PANEL: CPT

## 2022-01-15 PROCEDURE — 92523 SPEECH SOUND LANG COMPREHEN: CPT

## 2022-01-15 RX ORDER — GLIPIZIDE 5 MG/1
5 TABLET ORAL
Status: DISCONTINUED | OUTPATIENT
Start: 2022-01-15 | End: 2022-01-17

## 2022-01-15 RX ORDER — GLIPIZIDE 5 MG/1
5 TABLET ORAL
Status: DISCONTINUED | OUTPATIENT
Start: 2022-01-16 | End: 2022-01-15

## 2022-01-15 RX ADMIN — ENOXAPARIN SODIUM 30 MG: 100 INJECTION SUBCUTANEOUS at 20:08

## 2022-01-15 RX ADMIN — INSULIN LISPRO 2 UNITS: 100 INJECTION, SOLUTION INTRAVENOUS; SUBCUTANEOUS at 17:54

## 2022-01-15 RX ADMIN — ENOXAPARIN SODIUM 30 MG: 100 INJECTION SUBCUTANEOUS at 09:33

## 2022-01-15 RX ADMIN — FAMOTIDINE 20 MG: 20 TABLET, FILM COATED ORAL at 09:34

## 2022-01-15 RX ADMIN — GLIPIZIDE 5 MG: 5 TABLET ORAL at 16:24

## 2022-01-15 RX ADMIN — DOCUSATE SODIUM 100 MG: 100 CAPSULE, LIQUID FILLED ORAL at 20:03

## 2022-01-15 RX ADMIN — MICONAZOLE NITRATE: 2 POWDER TOPICAL at 23:51

## 2022-01-15 RX ADMIN — INSULIN LISPRO 2 UNITS: 100 INJECTION, SOLUTION INTRAVENOUS; SUBCUTANEOUS at 09:39

## 2022-01-15 RX ADMIN — MICONAZOLE NITRATE: 2 POWDER TOPICAL at 09:35

## 2022-01-15 RX ADMIN — INSULIN GLARGINE 20 UNITS: 100 INJECTION, SOLUTION SUBCUTANEOUS at 09:39

## 2022-01-15 RX ADMIN — FLUCONAZOLE 200 MG: 200 TABLET ORAL at 09:34

## 2022-01-15 RX ADMIN — FUROSEMIDE 40 MG: 40 TABLET ORAL at 09:35

## 2022-01-15 RX ADMIN — ROSUVASTATIN 10 MG: 10 TABLET, FILM COATED ORAL at 20:03

## 2022-01-15 RX ADMIN — ASPIRIN 81 MG CHEWABLE TABLET 81 MG: 81 TABLET CHEWABLE at 09:34

## 2022-01-15 RX ADMIN — DOCUSATE SODIUM 100 MG: 100 CAPSULE, LIQUID FILLED ORAL at 09:34

## 2022-01-15 RX ADMIN — CALCIUM POLYCARBOPHIL 625 MG: 625 TABLET, FILM COATED ORAL at 09:34

## 2022-01-15 RX ADMIN — FAMOTIDINE 20 MG: 20 TABLET, FILM COATED ORAL at 20:03

## 2022-01-15 RX ADMIN — Medication 3 MG: at 20:03

## 2022-01-15 ASSESSMENT — PAIN SCALES - GENERAL
PAINLEVEL_OUTOF10: 0
PAINLEVEL_OUTOF10: 0

## 2022-01-15 ASSESSMENT — ENCOUNTER SYMPTOMS: BACK PAIN: 0

## 2022-01-15 NOTE — PROGRESS NOTES
[place]  Speech - Language - Cognitive Evaluation + Clinical Swallow Evaluation    SLP Individual Minutes  Time In: 8214  Time Out: 1310  Minutes: 40  Timed Code Treatment Minutes: 0 Minutes   CSE: [id number]  [facility] Evaluation: 8728-1229    Date: 1/15/2022  Patient Name: Hodan Mendoza      CSN: [id number]   : 1950  (73 y.o.)  Gender: female   Referring Physician:  Lou Phan MD  Diagnosis: Personal history of COVID-19  Secondary Diagnosis: Dysphagia, Cognitive Impairment  Precautions: Aspiration Risk, Fall Risk  History of Present Illness/Injury: Patient admitted to [facility] with above diagnosis; please see physician H&P for full report. Per chart review, Jenni Whyte  is a 73 y.o. right-handed obese  female with a history of hypertension, diabetes mellitus type 2, coronary artery disease requiring coronary artery stenting, status post bilateral eyes cataract surgeries, status post 3  sections, status post hysterectomy, status post hiatal hernia repair, status post sinus surgery, is admitted to the inpatient rehabilitation unit on 2022 for intensive inpatient rehabilitation treatment of impaired ADL and ambulation secondary to critical illness myopathy and disability/physical decondition as result of COVID-19 pneumonia. The patient says he does not quite remember how she ended up in the hospital.     The patient apparently began experiencing reduced upper size, change in taste and smell, shortness of breath, nausea, dry heaves, and diarrhea starting in 2021. The symptom progressively worsened. She was advised by her PCP to visit ER. Therefore she went to [facility] ER on 2021. She was found to be hypoxic with O2 saturation in 70s% in room air. COVID test was performed which turned out to be positive.  She was advised to be admitted but she left against medical advice because she preferred to be admitted to [address] [place] [address] instead. On 12/18/2021 she came to [address] [place] [address] ER with complaint of shortness of breath. She was found to have oxygen saturation in the 80s% in room air. She was admitted with diagnosis of acute hypoxic respiratory failure secondary to COVID-19 pneumonia. She was treated with Decadron and Olumiant (baricitnib). However her condition deteriorated despite of treatment. She was intubated on 12/20/2021 and was admitted to ICU. Her hospital course also was complicated by superimposed Klebsiella oxytocin bacterial pneumonia, bradycardia requiring dopamine infusion, and decubitus ulcer. Her condition later stabilized and improved. She was extubated on 1/2/2022. \"    ST consulted to further evaluate oropharyngeal swallow integrity and cognitive function with implementation of goals/POC as clinically indicated. Past Medical History:   Diagnosis Date    Coronary artery disease     Primary hypertension     Type 2 diabetes mellitus ([facility] [address] 2018       Pain: No pain reported. Subjective:  Patient seen sitting upright in recliner with lunch tray present upon ST arrival; alert and cooperative throughout evaluation. Patient's daughter and grandchildren present throughout evaluation. SOCIAL HISTORY:   Living Arrangements: Home with Spouse  Work History: Retired  for [employer] Level: Some College  Driving Status: Active   Finance Management: Independent  Medication Management: Independent  ADL's: Independent. Hobbies: Greenhouse/Gardening, Crafting, Sewing  Vision Status: Corrective Lenses Worn  Hearing: WFL  Type of Home: House  Home Layout: One level  Home Access: Stairs to enter without rails  Entrance Stairs - Number of Steps: 1 step (very small)  Home Equipment:  (none)    SPEECH / VOICE:  Speech: Grossly intact for basic and complex daily communication.   Voice: Presence of dysphonic vocal quality suspect secondary to prolnoged intubation; monitor with ENT consult to be placed should dysphonic quality persist.     LANGUAGE:  Receptive:  1 Step Commands: 2/2 independent  2 Step Commands: 2/2 independent  Simple Yes/No Questions: 2/2 independent  Complex Yes/No Questions: 2/2 independent  Following Verbal Instructions: 4/4 independent  Receptive language skills appear to be grossly intact for basic and complex daily communication. Expressive:  Automatic Speech: 1/1 independent  Sentence Completion (automatic): 2/2 independent  Sentence Completion (open-ended): 2/2 independent  Divergent Naming (abstract): 4 members/60 seconds (Target=11 members/60 seconds)  Sentence Formulation: WFL  Conversational Speech: 100% intelligible  Expressive language skills appear to be grossly intact for basic and complex daily communication. COGNITION:  Osvaldo Cognitive Assessment (MOCA) version 7.2 completed. Pt scored 21/30. Normal is greater than or equal to 26/30.   Inclusion of +1 point given highest level of education achieved less than/equal to 12th grade or GED with limited-0 post-secondary schooling   Orientation: 5/6 independent  Immediate Recall: 5/5 independent  Short-Term Recall: 3/5 independent, 2/5 with category cuing  Divergent Namin members/60 seconds (Target=11 members/60 seconds)  Problem Solving: Mildly Impaired  Reasonin/2 independent  Sequencing: Mildly Impaired  Thought Organization: Mildly Impaired  Insight: Fair  Attention: 2/3 independent  Math Computation: 1/3 independent  Executive Functionin/5 independent    SWALLOWING:    Respiratory Status: Independent      Behavioral Observation: Alert and Oriented    ORAL MECHANISM EVALUATION:      Facial / Labial WFL    Lingual Impaired Lingual tremor present   Dentition WFL    Velum WFL    Vocal Quality Impaired Dysphonic vocal quality; improving   Sensation Not Tested    Cough WFL      PATIENT WAS EVALUATED USING:  Lunch Tray: soft and bite size chicken with BBQ reasoning    PATIENT GOAL:    Did not state. Will further assess during treatment. SHORT TERM GOALS:  Short-term Goals  Timeframe for Short-term Goals: 1 week  Goal 1: Patient wll consume a soft and bite-size diet with thin liquids while implementing recommended swallow strategies with no overt s/s of aspiration and adequate endurance to assist with meeting nutrition/hydration needs. Goal 2: Patient will consume advanced PO trials with ST while demonstrating timely/coordinated mastication, complete bolus clearance, no overt s/s of aspiration, and adeqaute endurance for potential diet advancement to least restrictive diet  Goal 3: Patient will complete pharyngeal strengthening exercises x10-15 (effortful swallow, chris) to improve overall timeliness of swallow, airway protection, and decrease pharyngeal residue. Goal 4: Patient will complete orientation and immediate/delayed/working memory tasks with 80% accuracy and moderate cuing in order to improve retention of novel information in current environment. Goal 5: Patient will complete verbal/visual reasoning and safety problem solving tasks with 80% accuracy and moderate cuing in order to improve independent completion of IADLs. Goal 6: Patient will complete executive functioning tasks (i.e., medications, finances, scheduling) with 80% accuracy and moderate cuing in order to improve independent completion of IADLs with support of family. LONG TERM GOALS:  Long-term Goals  Timeframe for Long-term Goals: 6 weeks  Goal 1: Patient will safely consume regular diet with thin liquids with implementation of compensatory swallowing strategies and withOUT overt s/s of aspiration in order to maintain adequate nutrition and hydration measures. Goal 2: Patient will improve cognitive functioning to at least a modified independent level in order to improve safety of IADL completion upon potential discharge to home environment.       Katelynn Leo M.S., CCC-SLP [id number]

## 2022-01-15 NOTE — PLAN OF CARE
Care plan reviewed with patient and verbalize understanding of the plan of care and contribute to goal setting. Problem: Falls - Risk of:  Goal: Will remain free from falls  Description: Will remain free from falls  Outcome: Ongoing  Goal: Absence of physical injury  Description: Absence of physical injury  Outcome: Ongoing    Pt uses call light appropriately. Pt ambulates 2 Assist Alonzo haider. Problem: Skin Integrity:  Goal: Will show no infection signs and symptoms  Description: Will show no infection signs and symptoms  Outcome: Ongoing  Goal: Absence of new skin breakdown  Description: Absence of new skin breakdown  Outcome: Ongoing    Pt repositions self as needed. No s/sx of infection. Pt afebrile. Problem: Pain:  Goal: Control of acute pain  Description: Control of acute pain  Outcome: Ongoing  Goal: Control of chronic pain  Description: Control of chronic pain  Outcome: Ongoing    Pt states 0 pain from a scale of 0-10.

## 2022-01-15 NOTE — PROGRESS NOTES
[address] ([place] [address]) THERAPY  [address] [facility]  EVALUATION    Time:     Time In: 0700  Time Out: 0830  Timed Code Treatment Minutes: 75 Minutes  Minutes: 90          Date: 1/15/2022  Patient Name: Kina Juarez,   Gender: female      MRN: [id number]  : 1950  (73 y.o.)  Referring Practitioner: Dr. Charles Alfonso  Diagnosis: personal history of COVID 19  Additional Pertinent Hx: eKyon Adams is a 72-year-old white female who presented to [facility] on 2021 with complaints of shortness of breath she has a past medical history lifetime non-smoker, CAD with stent placement in , hypertension, diabetes mellitus, dyslipidemia. Per report she presented to [facility] on  with complaints of hypoxia and shortness of breath. She was initially placed on 4 L nasal cannula.  patient had persistent hypoxia and was transitioned to high flow nasal cannula. On  patient was transitioned to BiPAP and transferred to [facility] ICU for intubation. Pt extubated . Restrictions/Precautions:  Restrictions/Precautions: General Precautions,Fall Risk  Position Activity Restriction  Other position/activity restrictions: out of COVID isolation 22, monitor BP    Subjective  Chart Reviewed: Yes,Orders,Progress Notes  Patient assessed for rehabilitation services?: Yes    Subjective: Pt sitting up in bedside chair awake and alert this date.  Pt talkative this am.    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 Pt stating her children all live close and would be willing to help. Spouse in good health as well to assist.    VISION:WNL    HEARING:  WNL    COGNITION: Slow Processing, Decreased Recall, Decreased Insight, Impaired Memory, Decreased Problem Solving and Decreased Safety Awareness    RANGE OF MOTION:  Bilateral Upper Extremity:  active ROM of bilateral UE into shoulder flexion of approx 70-80 degrees. noted tightness in scapulas not allowing for proper movement. would benefit from compeltion of scap mobs to decrease tightness to allow for increased shoulder movement    STRENGTH:  Bilateral Upper Extremity:  bilateral UE gneral weakness and deconditioniing throughout with decreased active movement as well    Right hand dominant    9 hole peg test: right 58.50- Pt tends to attempt to use both hands to grasp onto pegs requiring max cues to use one hand during                             Left : 1 min 2 sec- continues to requiring max cues for use of left hand only d/t wanting to use bilateral hands to pick pegs up. : right: 21,20,20= 20.3           Left 21,21,20= 20.67    SENSATION:   WFL    ADL:     EATING:Setup or clean-up assistance. Trudy Wallace CARE Score: 5. ORAL HYGIENE:Supervision or touching assistance (seataed in wheelchair at sinkside). Trudy Wallace CARE Score: 4. TOILETING HYGIENE:Substantial/maximal assistance. Trudy Wallace CARE Score: 2. SHOWERING/BATHING:Substantial/maximal assistance (sponge bathing for assistance with below waits). Trudy Wallace   CARE Score: 2.     UPPER BODY DRESSING:Partial/moderate assistance (difficulty donning overhead shirt d/t decreased AROM of bilateral shoulder). Johnna Gin CARE Score: 3. LOWER BODY DRESSING:Substantial/maximal assistance. Johnna Gin CARE Score: 2. FOOTWEAR:Substantial/maximal assistance   . CARE Score: 2. TOILET TRANSFER: Dependent (with use of jose stedy d/t pt being to fatigued after completing stand pivot dionte wheelchair). Johnna Gin CARE Score: 1. BALANCE:  Sitting Balance:  Stand By Assistance. at edge of chair  Standing Balance: Minimal Assistance. progressing to mod A with increased posterior lean when standing at walker for clothign management. Pt fatigues very quickly only allowing for standing of approx 25 sec    BED MOBILITY:  Not Tested    TRANSFERS:  Sit to Stand:  Minimal Assistance. from bedside chair with cues for hand placement on all trials. mod A from wheelchair with cues for hand placement   Stand to Sit: Minimal Assistance. for safe and cotnrolled decent  Stand pivot: Moderate Assistance from chait to wheelchair going to pts left. Max cues for safety and to maintain  on walker after pt attempting to reach for wheelchair instead. Pt fatigues quickly requiring max assistance for last small turn for propr placement in wheelchair . Pt unable to complete further stand pivot transfers from wheelchair to toilet or back into bedside chair d/t fatigue requiring use of jose stedy to complete     FUNCTIONAL MOBILITY:  OTR to further assess. Activity Tolerance:  Patient tolerance of  treatment: fair. Increased fatigue quickly      Assessment:  Assessment: Pt admitted to [facility] on 12/18 with increased SOB and hypoxia testing positive for COVID 19. Pt required intubation. Pt transferred to inpatient rehab on 1/14/22 with decreased endurance, blance, safety awareness , memory deficits and ability to complete her ADL tasks and mobility at [facility].  Pt requiring further skilled OT Services to imrpvoe her deficits and educate on safety awareness for more indep with ADL tasks and mobility to retuern home with spouse. Performance deficits / Impairments: Decreased functional mobility ,Decreased safe awareness,Decreased balance,Decreased ADL status,Decreased endurance,Decreased high-level IADLs,Decreased strength  Prognosis: Fair  Decision Making: Medium Complexity    Treatment Initiated: Treatment and education initiated within context of evaluation. Evaluation time included review of current medical information, gathering information related to past medical, social and functional history, completion of standardized testing, formal and informal observation of tasks, assessment of data and development of plan of care and goals. Treatment time included skilled education and facilitation of tasks to increase safety and independence with ADL's for improved functional independence and quality of life. Discharge Recommendations:  Continue to assess pending progress,Home with Home health OT    Patient Education:  OT Education: OT Shamar Templeton of Care,Transfer Training    Equipment Recommendations:  Equipment Needed: Yes  Mobility Devices: Walker    Plan:  Times per week: 5x week/90 min, 1x week/30 min  Current Treatment Recommendations: Strengthening,Balance Training,Functional Mobility Training,Endurance Training,Home Management Training,Patient/Caregiver Education & Training,Self-Care / ADL,Safety Education & Training. See long-term goal time frame for expected duration of plan of care. If no long-term goals established, a short length of stay is anticipated.     Goals:  Patient goals : get stronger  Short term goals  Time Frame for Short term goals: 1 week  Short term goal 1: Pt to increase standing endurance to tolerate > 1 min with bilateral UE support and CGA in prep for hand release to complete clothign management  Short term goal 2: Pt to complete LB ADL tasks with use LHAE as needed with mod A  Short term goal 3: Pt will complete BUE AROM greater than 90 degrees with no > than Min A and min VC to increase shoulder flexion to reach outside base of support for needed object to complete ADL tasks  Short term goal 4: Pt to complete stand pivot transfers with min A consistently and no no cues for safety to increase indep with completing toilet transfers  Short term goal 5: Pt to participate in further assessment of mobility with OTR as her strength and endurance increases to increase indep with ADL tasks  Long term goals  Time Frame for Long term goals : 3 week  Long term goal 1: Pt to complete BADL routine with SBA and no cues for safety  Long term goal 2: Pt to complete sit to stand transfers from various surfaces including toilet with SBA and no ceus for safety  Long term goal 3: Pt to demo standing balance with unilateral UE handn release with SBA to complete clothign management after dressign and toielting         Following session, patient left in safe position with all fall risk precautions in place.

## 2022-01-15 NOTE — PROGRESS NOTES
[address] [id number]  INPATIENT PHYSICAL THERAPY  EVALUATION  [facility]    Time In: 1000  Time Out: 1100  Timed Code Treatment Minutes: 45 Minutes  Minutes: 60          Date: 1/15/2022  Patient Name: Tone Vera,  Gender:  female        MRN: [id number]  : 1950  (73 y.o.)      Referring Practitioner: Sri Gonzalez MD  Diagnosis: Personal history of COVID-19  Additional Pertinent Hx: Per Acute notes, \"Kaleigh Chandra is a 72-year-old white female who presented to [facility] on 2021 with complaints of shortness of breath she has a past medical history lifetime non-smoker, CAD with stent placement in , hypertension, diabetes mellitus, dyslipidemia. Per report she presented to [facility] on  with complaints of hypoxia and shortness of breath. She was initially placed on 4 L nasal cannula.  patient had persistent hypoxia and was transitioned to high flow nasal cannula. On  patient was transitioned to BiPAP and transferred to [facility] ICU for intubation. Pt extubated . \"     Restrictions/Precautions:  Restrictions/Precautions: General Precautions,Fall Risk  Position Activity Restriction  Other position/activity restrictions: out of COVID isolation 22, monitor BP    Subjective:  Chart Reviewed: Yes  Patient assessed for rehabilitation services?: Yes  Subjective: RN approved session. Pt resting in recliner and agrees to therapy. Pt reports that she is waiting on her  to bring in some water from home.     General:  Overall Orientation Status: Within Functional Limits    Vision: Within Functional Limits    Hearing: Within functional limits         Pain: denies      Vitals: Vitals not assessed per clinical judgement, see nursing flowsheet    Social/Functional History:    Lives With: Spouse  Type of Home: House  Home Layout: One level  Home Access: Stairs to enter without rails  Entrance Stairs - Number of Steps: 1 step (very small)  Home Equipment:  (none)     Bathroom Shower/Tub: Walk-in shower (+ [place])  Bathroom Toilet: Handicap height  Bathroom Equipment: Built-in shower seat (pt stating she has a bench right outside shower as well to sit on dry off)  Bathroom Accessibility: Accessible  IADL Comments: 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 Pt stating her children all live close and would be willing to help. Spouse in good health as well to assist.    OBJECTIVE:  Range of Motion:  Bilateral Lower Extremity: WFL    Strength:  Bilateral Lower Extremity: grossly 3+/5    Balance:  Static Sitting Balance:  Supervision  Dynamic Sitting Balance: Stand By Assistance  Static Standing Balance: Contact Guard Assistance, with walker  Dynamic Standing Balance: Contact Guard to Minimal Assistance, with walker    Bed Mobility:  Rolling to Left: Contact Guard Assistance, with head of bed flat, with rail, with increased time for completion   Rolling to Right: Contact Guard Assistance, with head of bed flat, with rail, with increased time for completion   Supine to Sit: Moderate Assistance, with head of bed flat, with rail, with increased time for completion  Sit to Supine: Minimal Assistance, with head of bed flat, with rail, with increased time for completion     Transfers:  Sit to Stand: Minimal Assistance  Stand to [facility]  Car:not tested    Ambulation:  Minimal Assistance, X 1  Distance: 5 ft  Surface: Level Tile  Device:Rolling Walker  Gait Deviations:   Forward Flexed Posture, Decreased Step Length Bilaterally, Decreased Weight Shift Bilaterally, Decreased Gait Speed, Decreased Heel Strike Bilaterally, Narrow Base of Support, Unsteady Gait and pt not able to fully clear either foot and rarely moved either foot >4-6 inches at a time. Exercise:  Patient was guided in 1 set(s) 10 reps of exercise to both lower extremities. Ankle pumps, Glut sets, Quad sets, Heelslides, Hip abduction/adduction, Seated marches, Seated heel/toe raises, Long arc quads and attempted Standing marches in the // bars to focus on lifting each foot with limited success. Exercises were completed for increased independence with functional mobility. Functional Outcome Measures: Not completed       ASSESSMENT:  Activity Tolerance:  Patient tolerance of  treatment: good. With multiple rest breaks due to fatigue      Treatment Initiated: Treatment and education initiated within context of evaluation. Evaluation time included review of current medical information, gathering information related to past medical, social and functional history, completion of standardized testing, formal and informal observation of tasks, assessment of data and development of plan of care and goals. Treatment time included skilled education and facilitation of tasks to increase safety and independence with functional mobility for improved independence and quality of life. Assessment: Body structures, Functions, Activity limitations: Decreased functional mobility ,Decreased balance,Decreased cognition,Decreased endurance,Decreased strength  Assessment: Pt is a 72 yo female that is limited by weakness and deconditioning due to extended illness and intubation x 2 weeks. Pt was generally independent in PLOF and is now requiring Min to Mod A for safety with transfers, bed mobility, and very limited ambulation. Pt would benefit from continued skilled PT in the Inpt Rehab setting for addressing strength, endurance building, and functional mobility before being safely able to return to her home.   Prognosis: Good         Discharge Recommendations:  Discharge Recommendations: Continue to assess pending progress    Patient Education:  PT Education: Nino Pallas of Care,General Safety,Home Exercise Program,Gait Training,Functional Mobility Training    Equipment Recommendations: Other: will monitor for needs pending progress and DC destination    Plan:  Times per week: 5x/wk 90 min; 1x/wk 30 min  Current Treatment Recommendations: Strengthening,Positioning,Patient/Caregiver Education & Training,Safety Education & Training,Balance Training,Endurance Training,Functional Mobility Training,Neuromuscular Re-education,Gait Training,Transfer Training,Home Exercise Program,Equipment Evaluation, Education, & procurement    Goals:  Patient goals : to get stronger  Short term goals  Time Frame for Short term goals: 1 week  Short term goal 1: Pt to be CGA for supine <> sit to get in/out of bed  Short term goal 2: Pt to be CGA for sit <> stand to get up to ambulate/transfer  Short term goal 3: Pt to be CGA for stand-pivot with RW to get between seats  Short term goal 4: Pt to ambulate > 10 ft with RW with Min A to get to bathroom  Short term goal 5: Pt to complete car transfer with Min A for transportation needs  Long term goals  Time Frame for Long term goals : 3 weeks  Long term goal 1: Pt to be Supervision for supine <> sit to get in/out of bed  Long term goal 2: Pt to be Supervision for sit <> stand to get up to ambulate/transfer  Long term goal 3: Pt to be Supervision for stand-pivot with RW to get between seats  Long term goal 4: Pt to ambulate > 50 ft with RW with SBA for household distances  Long term goal 5: Pt to complete car transfer with Supervision for transportation needs    Following session, patient left in safe position with all fall risk precautions in place. Jn Muse.  Hilary Tom [place] 8

## 2022-01-16 LAB — GLUCOSE BLD-MCNC: 128 MG/DL (ref 70–108)

## 2022-01-16 PROCEDURE — 6370000000 HC RX 637 (ALT 250 FOR IP): Performed by: PHYSICAL MEDICINE & REHABILITATION

## 2022-01-16 PROCEDURE — 1180000000 HC REHAB R&B

## 2022-01-16 PROCEDURE — 82948 REAGENT STRIP/BLOOD GLUCOSE: CPT

## 2022-01-16 PROCEDURE — 6360000002 HC RX W HCPCS: Performed by: PHYSICAL MEDICINE & REHABILITATION

## 2022-01-16 PROCEDURE — 51798 US URINE CAPACITY MEASURE: CPT

## 2022-01-16 RX ORDER — ALOGLIPTIN 25 MG/1
25 TABLET, FILM COATED ORAL DAILY
Status: DISCONTINUED | OUTPATIENT
Start: 2022-01-17 | End: 2022-01-16

## 2022-01-16 RX ADMIN — MICONAZOLE NITRATE: 2 POWDER TOPICAL at 08:34

## 2022-01-16 RX ADMIN — ASPIRIN 81 MG CHEWABLE TABLET 81 MG: 81 TABLET CHEWABLE at 08:29

## 2022-01-16 RX ADMIN — GLIPIZIDE 5 MG: 5 TABLET ORAL at 16:56

## 2022-01-16 RX ADMIN — CALCIUM POLYCARBOPHIL 625 MG: 625 TABLET, FILM COATED ORAL at 08:29

## 2022-01-16 RX ADMIN — FUROSEMIDE 40 MG: 40 TABLET ORAL at 08:29

## 2022-01-16 RX ADMIN — Medication 3 MG: at 20:09

## 2022-01-16 RX ADMIN — FAMOTIDINE 20 MG: 20 TABLET, FILM COATED ORAL at 08:29

## 2022-01-16 RX ADMIN — DOCUSATE SODIUM 100 MG: 100 CAPSULE, LIQUID FILLED ORAL at 08:29

## 2022-01-16 RX ADMIN — FAMOTIDINE 20 MG: 20 TABLET, FILM COATED ORAL at 20:09

## 2022-01-16 RX ADMIN — ENOXAPARIN SODIUM 30 MG: 100 INJECTION SUBCUTANEOUS at 08:26

## 2022-01-16 RX ADMIN — DOCUSATE SODIUM 100 MG: 100 CAPSULE, LIQUID FILLED ORAL at 20:09

## 2022-01-16 RX ADMIN — ENOXAPARIN SODIUM 30 MG: 100 INJECTION SUBCUTANEOUS at 20:09

## 2022-01-16 RX ADMIN — GLIPIZIDE 5 MG: 5 TABLET ORAL at 06:04

## 2022-01-16 RX ADMIN — ROSUVASTATIN 10 MG: 10 TABLET, FILM COATED ORAL at 20:09

## 2022-01-16 RX ADMIN — INSULIN GLARGINE 20 UNITS: 100 INJECTION, SOLUTION SUBCUTANEOUS at 08:25

## 2022-01-16 RX ADMIN — FLUCONAZOLE 200 MG: 200 TABLET ORAL at 08:29

## 2022-01-16 ASSESSMENT — ENCOUNTER SYMPTOMS
TROUBLE SWALLOWING: 0
BACK PAIN: 0
WHEEZING: 0
SHORTNESS OF BREATH: 0
VOMITING: 0
CONSTIPATION: 0
DIARRHEA: 0
ABDOMINAL PAIN: 0
COUGH: 0
SORE THROAT: 0
NAUSEA: 0
RHINORRHEA: 0

## 2022-01-16 ASSESSMENT — PAIN SCALES - GENERAL
PAINLEVEL_OUTOF10: 0
PAINLEVEL_OUTOF10: 0

## 2022-01-16 NOTE — PROGRESS NOTES
Per Dr. Santo Wyman it is okay to use Mary device for glucose result. Please put results in flowsheet.

## 2022-01-16 NOTE — CONSULTS
Department of Family Practice  Consult Note        Reason for Consult:  Medical management while on the Inpatient Rehab unit. Requesting Physician:  Dr Marlyn Sheppard:   The need to continue the intensive time with therapies following the acute hospital stay. History Obtained From:  patient, spouse, and EMR    HISTORY OF PRESENT ILLNESS:              The patient is a 73 y.o. female with significant past medical history of       Diagnosis Date    Coronary artery disease     Primary hypertension     Type 2 diabetes mellitus ([facility] [address] 2018      who presents with a prolonged illness from [facility]. She was intubated for a time. She states having little recollection of the events. She now is becoming stronger and has come to the Inpatient Rehab Unit to continue the time with therapies prior to a discharge disposition being made.       Past Medical History:        Diagnosis Date    Coronary artery disease     Primary hypertension     Type 2 diabetes mellitus ([facility] [address] 2018     Past Surgical History:        Procedure Laterality Date    CATARACT REMOVAL Bilateral      SECTION      3 times    CORONARY ANGIOPLASTY WITH STENT PLACEMENT      stenting twice    HIATAL HERNIA REPAIR      late     HYSTERECTOMY, TOTAL ABDOMINAL      in  [address]      in      Current Medications:   Current Facility-Administered Medications: glipiZIDE (GLUCOTROL) tablet 5 mg, 5 mg, Oral, BID AC  enoxaparin (LOVENOX) injection 30 mg, 30 mg, SubCUTAneous, Q12H  polyethylene glycol (GLYCOLAX) packet 17 g, 17 g, Oral, Daily PRN  senna (SENOKOT) 8.8 MG/5ML syrup 8.8 mg, 5 mL, Oral, BID PRN  dextrose 50 % IV solution, 12.5 g, IntraVENous, PRN  acetaminophen (TYLENOL) tablet 650 mg, 650 mg, Oral, Q4H PRN  albuterol sulfate  (90 Base) MCG/ACT inhaler 2 puff, 2 puff, Inhalation, Q6H PRN  aspirin chewable tablet 81 mg, 81 mg, Oral, Daily  bisacodyl (DULCOLAX) suppository 10 mg, 10 mg, Rectal, Daily PRN  dextrose 5 % solution, 100 mL/hr, IntraVENous, PRN  dextrose 50 % IV solution, 12.5 g, IntraVENous, PRN  docusate sodium (COLACE) capsule 100 mg, 100 mg, Oral, BID  famotidine (PEPCID) tablet 20 mg, 20 mg, Oral, BID  fluconazole (DIFLUCAN) tablet 200 mg, 200 mg, Oral, Daily  furosemide (LASIX) tablet 40 mg, 40 mg, Oral, Daily  glucagon (rDNA) injection 1 mg, 1 mg, IntraMUSCular, PRN  glucose (GLUTOSE) 40 % oral gel 15 g, 15 g, Oral, PRN  hydrOXYzine (VISTARIL) capsule 25 mg, 25 mg, Oral, 4x Daily PRN  magnesium hydroxide (MILK OF MAGNESIA) 400 MG/5ML suspension 15 mL, 15 mL, Oral, Daily PRN  melatonin tablet 3 mg, 3 mg, Oral, Nightly PRN  ondansetron (ZOFRAN-ODT) disintegrating tablet 4 mg, 4 mg, Oral, Q8H PRN  polycarbophil (FIBERCON) tablet 625 mg, 625 mg, Oral, Daily  rosuvastatin (CRESTOR) tablet 10 mg, 10 mg, Oral, Daily  nitroGLYCERIN (NITROSTAT) SL tablet 0.4 mg, 0.4 mg, SubLINGual, Q5 Min PRN  miconazole (MICOTIN) 2 % powder, , Topical, BID  Allergies:  Metformin and related, Sulfa antibiotics, and Codeine    Social History:   MARITAL STATUS:      Family History:       Problem Relation Age of Onset    Parkinson's Disease Father     Lung Cancer Father      REVIEW OF SYSTEMS:    CONSTITUTIONAL:  positive for  fatigue  EYES:  negative for  eye discharge  HEENT:  negative for  nasal congestion  RESPIRATORY:  negative for  dyspnea  CARDIOVASCULAR:  negative for  chest pain  GASTROINTESTINAL:  negative for dysphagia  GENITOURINARY:  negative for dysuria  INTEGUMENT/BREAST:  negative for rash  HEMATOLOGIC/LYMPHATIC:  negative for petechiae  ALLERGIC/IMMUNOLOGIC:  negative for anaphylaxis  ENDOCRINE:  negative for diabetic symptoms including polyphagia  MUSCULOSKELETAL:  positive for  myalgias, arthralgias and muscle weakness  NEUROLOGICAL:  positive for coordination problems, gait problems and weakness  BEHAVIOR/PSYCH:  negative for increased agitation  PHYSICAL EXAM:      Vitals:    /71

## 2022-01-16 NOTE — PROGRESS NOTES
Physical Medicine & Rehabilitation Progress Note    Chief Complaint:  Fatigue and tired    Subjective:    Wesly Carpenter is a 73 y.o. right-handed obese  female with history of hypertension, diabetes mellitus type 2, coronary artery disease requiring coronary artery stenting, status post bilateral eyes cataract surgeries, status post 3  sections, status post hysterectomy, status post hiatal hernia repair, status post sinus surgery, was admitted on 2022 for intensive inpatient management of impairment & disability secondary to critical illness myopathy and disability/physical decondition as result of COVID-19 pneumonia. The patient says he does not quite remember how she ended up in the hospital.     The patient apparently began experiencing reduced upper size, change in taste and smell, shortness of breath, nausea, dry heaves, and diarrhea starting in 2021. The symptom progressively worsened. She was advised by her PCP to visit ER. Therefore she went to [facility] ER on 2021. She was found to be hypoxic with O2 saturation in 70s% in room air. COVID test was performed which turned out to be positive. She was advised to be admitted but she left against medical advice because she preferred to be admitted to [address] [place] [address] instead. On 2021 she came to [address] [place] [address] ER with complaint of shortness of breath. She was found to have oxygen saturation in the 80s% in room air. She was admitted with diagnosis of acute hypoxic respiratory failure secondary to COVID-19 pneumonia. She was treated with Decadron and Olumiant (baricitnib). However her condition deteriorated despite of treatment. She was intubated on 2021 and was admitted to ICU. Her hospital course also was complicated by superimposed Klebsiella oxytocin bacterial pneumonia, bradycardia requiring dopamine infusion, and decubitus ulcer. Her condition later stabilized and improved. She was extubated on 1/2/2022. The patient says she feels well today. She still feels fatigue with generalized weakness but denies having any pain, shortness of breath, cough, sore throat, or numbness. She says she still has walking difficulty with shuffling gait and feels like her legs are not listening to her. She tolerates the intensive inpatient rehabilitation treatment well so far. Rehabilitation:  PT: Reviewed. Balance:  Static Sitting Balance:  Supervision  Dynamic Sitting Balance: Stand By Assistance  Static Standing Balance: Contact Guard Assistance, with walker  Dynamic Standing Balance: Contact Guard to Minimal Assistance, with walker     Bed Mobility:  Rolling to Left: Contact Guard Assistance, with head of bed flat, with rail, with increased time for completion   Rolling to Right: Contact Guard Assistance, with head of bed flat, with rail, with increased time for completion   Supine to Sit: Moderate Assistance, with head of bed flat, with rail, with increased time for completion  Sit to Supine: Minimal Assistance, with head of bed flat, with rail, with increased time for completion      Transfers:  Sit to Stand: Minimal Assistance  Stand to [address] [place] [address]  Car:not tested     Ambulation:  Minimal Assistance, X 1  Distance: 5 ft  Surface: Level Tile  Device:Rolling Walker  Gait Deviations: Forward Flexed Posture, Decreased Step Length Bilaterally, Decreased Weight Shift Bilaterally, Decreased Gait Speed, Decreased Heel Strike Bilaterally, Narrow Base of Support, Unsteady Gait and pt not able to fully clear either foot and rarely moved either foot >4-6 inches at a time. OT: Reviewed. ADL:   Feeding: with set-up to open pepper packets, patient opened her own straw, milk and lid. Grooming: Stand By Assistance and with set-up. to wash hair, style with curling gel,  UE weakness noted prompting patient to need frequent breaks during task. A needed to squeeze bottles due to poor  strength   Bathing: Minimal Assistance. A to wash margarita feet and buttocks in the shower,  completed sitting on shower chair   Upper Extremity Dressing: Stand By Assistance. Donning long sleeve shirt   Lower Extremity Dressing: Moderate Assistance. To don pants and socks,  Introduced dressing stick, sock aid and LH shoe horn. Repetition needed for recall and problem solving of use,   Shower Transfer: Minimal Assistance. Ambulating to 2 feet into shower with CGA/ light min A to control decent onto shower chair. Lengthy time needed for showering and ADL routine due to patient requiring frequent rest breaks due to fatigue, explanation and repetition of LHAE use and cues for attention to task. Pt very appreciative of a shower.      BALANCE:  Sitting Balance:  Stand By Assistance. Standing Balance: Contact Guard Assistance. during unilateral release during ADLs      BED MOBILITY:  Supine to Sit: Moderate Assistance with HOB at 20 degrees      TRANSFERS:  Sit to Stand:  Contact Guard Assistance, Minimal Assistance, X 1, with increased time for completion, cues for hand placement. Stand to Sit: Contact Guard Assistance, Minimal Assistance, X 1, with increased time for completion, cues for hand placement, with verbal cues, to/from chair with arms, to/from chair without arms. Stand pivot: Contact guard assistance from bed to wheelchair,  Increased time needed      FUNCTIONAL MOBILITY:  Assistive Device: Rolling Walker  Assist Level:  Minimal Assistance. Distance: 2 feet to and from shower chair        ST: Reviewed. Osvaldo Cognitive Assessment [facility]) version 7.2 completed. Pt scored 21/30. Normal is greater than or equal to 26/30.     DIAGNOSTIC IMPRESSIONS:            Ujtxnt-Rqakjemx-Ewhoretjd Evaluation: Patient presents with mild cognitive impairment evidenced by deficits within visual/verbal reasoning, executive functioning (e.g., medications, finances, etc.), working memory, delayed recall, problem solving, thought organization, mental flexibility, and attention measures. Patient vocal quality dysphonic following prolonged intubation; consult ENT should dysphonic quality persist. [facility] with no presence of dysarthria; patient intelligible at the conversation level with approximately 100% accuracy. Expressive and receptive language skills grossly intact with no apparent communicative breakdowns. Skilled ST services are recommended to address the aforementioned impairments to improve cognitive performance for ADL/IADL completion.      Clinical Swallow Evaluation: Patient presents with mild oral dysphagia with inability to fully discern potential presence of pharyngeal phase deficits without formal instrumentation. Patient demonstrated evidence of mildly impaired bolus control/containment, prolonged mastication with adequate textural breakdown given additional time. Mildly impaired bolus formation resulting in min residuals on lingual surface. Patient with adequate control/containment of liquid bolus. Suspected timely swallow initiation with no overt s/s of aspiration across all trials. Certainly not able to r/o totality of airway invasion events and/or pharyngeal dysfunction at bedside alone, however formal instrumentation not warranted at this time. MBS completed 1/4 with recommendations for  soft and bite-size diet with thin liquids with direct 1:1 assistance d/t reduced UE ROM/Strength and implementation of upright position, small bites/sips, alternation of bites/sips, and double swallow. Patient's swallow physiology appears functional to support goal for comfortable oral intake without distress. Recommend continuation of soft and bite size diet with thin liquids (OK straw) with implementation of compensatory swallowing strategies. Patient would benefit from skilled ST services to provide dysphagia management.        Review of Systems:  Review of Systems   Constitutional: Positive for fatigue. Negative for chills, diaphoresis and fever. HENT: Negative for hearing loss, rhinorrhea, sneezing, sore throat and trouble swallowing. Eyes: Negative for visual disturbance. Respiratory: Negative for cough, shortness of breath and wheezing. Cardiovascular: Negative for chest pain and palpitations. Gastrointestinal: Negative for abdominal pain, constipation, diarrhea, nausea and vomiting. Genitourinary: Negative for dysuria. Musculoskeletal: Positive for gait problem. Negative for arthralgias, back pain, myalgias and neck pain. Skin: Negative for rash. Neurological: Positive for weakness (Generalized). Negative for dizziness, tremors, light-headedness, numbness and headaches. Psychiatric/Behavioral: Negative for dysphoric mood, hallucinations and sleep disturbance. The patient is not nervous/anxious.          Objective:  /71   Pulse 76   Temp 98 °F (36.7 °C) (Oral)   Resp 18   Ht 5' 2\" (1.575 m)   Wt 189 lb 11.2 oz (86 kg)   LMP  (LMP Unknown)   SpO2 93%   Breastfeeding No   BMI 34.70 kg/m²   Physical Exam   General:  well-developed, well nourished obese  female ; in no acute distress ; appropriate affect & mood; sitting on wheelchair comfortably  Eyes: pupil equally round ; extra-ocular motion intact bilaterally  Head, Ear, Nose, Mouth & Throat : normocephalic ; no discharge from ears or nose ; no deformity ; no facial swelling ; oral mucosa pink   Neck :  supple ; no tenderness ; no muscle spasm  Cardiovascular : regular rate & rhythm ; normal S1 & S2 heart sound ; no murmur ; normal peripheral pulse at bilateral upper & lower extremities  Pulmonary : Breath sounds present at bilateral lung fields; no wheezing ; no rale; no crackle  Gastrointestinal : soft, protruded abdomen without tenderness ; normal bowel sound present   Back : no tenderness; no muscle spasm  Skin: no skin lesion or rash ; no pitting edema at all 4 extremities  Musculoskeletal : no limb asymmetry; no limb deformity; no tenderness at bilateral upper & lower extremities; no palpable mass at limbs ; no joints laxity or crepitation ; hip flexion passive ROM reaching 90 degrees bilaterally; ankle dorsiflexion passive ROM reaching 5 degrees bilaterally; otherwise normal functional joints ROM at the rest of bilateral upper & lower extremities  Cerebral :  alert ; awake ; oriented to place, person and time; follow two-step verbal command  Cerebellum : no dysmetria with bilateral finger-to-nose test ; unable to perform  heel-to-shin test on both sides  Cranial Nerves :  grossly intact CN II to XII function  Sensory : intact light touch and pin prick sensation at bilateral upper & lower extremities  Motor : normal tone at bilateral upper & lower extremities ; 4+/5 muscle strength at  right shoulder flexion & abduction ; 4+/5 to 5/5 muscle strength at the left shoulder flexion and abduction; 4+/5 to 5/5 muscle strength at the right elbow flexion and extension; 4+5 to 5/5 muscle strength at left wrist extension; 4+5 muscle strength at the left fingers flexion, extension and abduction; 4+5 to 5/5 muscle strength at the right finger abduction; 3-/5 to 3+5 muscle strength at the right hip flexion; 3+/5 to 4-/5 muscle strength at the left hip flexion; 4-/5 muscle strength at the right knee flexion; 4-/5 to 4+/5 muscle strength at the left knee flexion; otherwise 5/5 muscle strength at the rest of bilateral upper and the lower extremities  Reflex : 0 bilateral biceps, bilateral brachioradialis and bilateral knees reflexes   Pathological Reflex :  No Roberta's sign ; no ankle clonus  Gait : Not assessed      Diagnostics:   Recent Results (from the past 24 hour(s))   POCT glucose    Collection Time: 01/15/22  5:24 PM   Result Value Ref Range    POC Glucose 174 (H) 70 - 108 mg/dl   POCT glucose    Collection Time: 01/16/22  1:40 PM   Result Value Ref Range    POC Glucose 128 (H) 70 - 108 mg/dl     Results for Awilda Nova (MRN [id number]) as of 1/17/2022 10:08   Ref. Range 1/15/2022 12:26 1/15/2022 17:24 1/16/2022 13:40 1/17/2022 08:09   POC Glucose Latest Ref Range: 70 - 108 mg/dl 134 (H) 174 (H) 128 (H) 144 (H)       Impression:  · Critical illness myopathy   · Debility/physical decondition secondary to [id number] pneumonia, complicated by bacterial pneumonia, bradycardia, and decubitus ulcer  · History of COVID-19 pneumonia with acute hypoxic respiratory failure requiring intubation, complicated by Klebsiella oxytocin bacterial pneumonia  · Mild dysphagia/moderate pharyngeal dysphagia  · Mild cognitive impairment  · Hypertension  · Diabetes mellitus type 2  · History of coronary artery disease requiring coronary artery stenting  · Severe cardiomyopathy with reduced ejection fraction  · Obesity    The patient's condition remains stable. Her blood glucose level is better now. She still have generalized weakness especially at her bilateral hips. She is tolerating the intensive inpatient rehabilitation treatment well. Her function begins to improve slowly. Plan:  · Continue intensive PT/OT/SLP/RT inpatient rehabilitation program at least 3 hours per day, 5 days per week in order to improve functional status prior to discharge. Family education and training will be completed. Equipment evaluations and recommendations will be completed as appropriate. · Rehabilitation nursing continue to be involved for bowel, bladder, skin, and pain management. Nursing will also provide education and training to patient and family. · Prophylaxis:  DVT: Lovenox, KENA stocking, intermittent pneumatic compression device. GI: Colace, FiberCon, Dulcolax suppository as needed, milk of magnesium as needed, GlycoLax as needed, Senokot as needed.   · Pain: Tylenol as needed  · Continue aspirin, Crestor for coronary artery disease  · Continue Pepcid for gastric protection  · Continue Lasix for hypertension  · Continue Crestor for hyperlipidemia  · Continue glipizide, Lantus insulin and Humalog insulin coverage for diabetes mellitus  · Continue albuterol inhaler as needed for COVID-19 pneumonia  · Melatonin as needed for insomnia  · Nutrition:   Continue current diet  · Bladder: Monitoring signs or symptoms of UTI  · Bowel: Monitoring signs or symptoms of constipation  ·  and case management consultations for coordination of care and discharge planning      Missed Therapy Time:  · None    Odette Koyanagi, MD

## 2022-01-16 NOTE — PLAN OF CARE
Individualized Plan of [facility] Rehabilitation Unit    Rehabilitation physician: Dr. Bryon Patrick Date: 1/14/2022     Rehabilitation Diagnosis: Personal history of COVID-19 [Z86.16]      Rehabilitation impairments: self care, mobility, bowel/bladder management, safety and cognitive function    Factors facilitating achievement of predicted outcomes: Family support, Motivated, Cooperative and Pleasant  Barriers to the achievement of predicted outcomes: Decreased endurance, Upper extremity weakness, Lower extremity weakness and Medication managment    Patient Goals: Improve independence with mobility, Improvement of mobility at a wheelchair level, Increase overall strength and endurance, Increase balance, Increase endurance, Increase independence with activities of daily living, Improve cognition, Increase self-awareness, Increase safety awareness, Increase community integration, Increase socialization, Functional communication with caregivers, Integrate appropriate pain management plan, Assure adequate nutritional option for discharge, Continence of bowel and bladder and Provide appropriate patient and family education      NURSING:  Nursing goals for Ángel Hightower while on the rehabilitation unit will include:  Continence of bowel and bladder, Adequate number of bowel movements, Urinate with no urinary retention >300ml in bladder, Maintain O2 SATs at an acceptable level during stay, Effective pain management while on the rehabilitation unit, Establish adequate pain control plan for discharge, Absence of skin breakdown while on the rehabilitation unit, Improved skin integrity via assessments including wound measurements, Avoidance of any hospital acquired infections, No signs/symptoms of infection at the wound site, Freedom from injury during hospitalization and Complete education with patient/family with understanding demonstrated regarding disease process and resultant impairment     In order to achieve these goals, nursing interventions may include bowel/bladder training, education for medical assistive devices, medication education, O2 saturation management, energy conservation, stress management techniques, fall prevention, alarms protocol, seating and positioning, skin/wound care, pressure relief instruction, dressing changes, infection protection, DVT prophylaxis, assistance with safe transfers , and/or assistance with bathroom activities and hygiene. PHYSICAL THERAPY:  Goals:        Short term goals  Time Frame for Short term goals: 1 week  Short term goal 1: Pt to be CGA for supine <> sit to get in/out of bed  Short term goal 2: Pt to be CGA for sit <> stand to get up to ambulate/transfer  Short term goal 3: Pt to be CGA for stand-pivot with RW to get between seats  Short term goal 4: Pt to ambulate > 10 ft with RW with Min A to get to bathroom  Short term goal 5: Pt to complete car transfer with Min A for transportation needs  Long term goals  Time Frame for Long term goals : 3 weeks  Long term goal 1: Pt to be Supervision for supine <> sit to get in/out of bed  Long term goal 2: Pt to be Supervision for sit <> stand to get up to ambulate/transfer  Long term goal 3: Pt to be Supervision for stand-pivot with RW to get between seats  Long term goal 4: Pt to ambulate > 50 ft with RW with SBA for household distances  Long term goal 5: Pt to complete car transfer with Supervision for transportation needs    Plan of Care: Patient to be seen by physical therapy services 90 minutes per day Monday through Friday and 30 minutes on Saturday or Sunday    Anticipated interventions may include therapeutic exercises, gait training, neuromuscular re-ed, transfer training, community reintegration, bed mobility, w/c mobility and training.       OCCUPATIONAL THERAPY:  Goals:             Short term goals  Time Frame for Short term goals: 1 week  Short term goal 1: Pt to increase standing endurance to tolerate > 1 min with bilateral UE support and CGA in prep for hand release to complete clothign management  Short term goal 2: Pt to complete LB ADL tasks with use LHAE as needed with mod A  Short term goal 3: Pt will complete BUE AROM greater than 90 degrees with no > than Min A and min VC to increase shoulder flexion to reach outside base of support for needed object to complete ADL tasks  Short term goal 4: Pt to complete stand pivot transfers with min A consistently and no no cues for safety to increase indep with completing toilet transfers  Short term goal 5: Pt to participate in further assessment of mobility with OTR as her strength and endurance increases to increase indep with ADL tasks  Long term goals  Time Frame for Long term goals : 3 week  Long term goal 1: Pt to complete BADL routine with SBA and no cues for safety  Long term goal 2: Pt to complete sit to stand transfers from various surfaces including toilet with SBA and no ceus for safety  Long term goal 3: Pt to demo standing balance with unilateral UE handn release with SBA to complete clothign management after dressign and toielting    Plan of Care: Patient to be seen by occupational therapy services 90 minutes per day Monday through Friday and 30 minutes on Saturday or Sunday    Anticipated interventions may include ADL and IADL retraining, strengthening, safety education and training, patient/caregiver education and training, equipment evaluation/ training/procurement, neuromuscular reeducation, wheelchair mobility training. SPEECH THERAPY:   Short-term Goals  Timeframe for Short-term Goals: 1 week  Goal 1: Patient wll consume a soft and bite-size diet with thin liquids while implementing recommended swallow strategies with no overt s/s of aspiration and adequate endurance to assist with meeting nutrition/hydration needs.   Goal 2: Patient will consume advanced PO trials with ST while demonstrating timely/coordinated mastication, complete bolus clearance, no overt s/s of aspiration, and adeqaute endurance for potential diet advancement to least restrictive diet  Goal 3: Patient will complete pharyngeal strengthening exercises x10-15 (effortful swallow, chris) to improve overall timeliness of swallow, airway protection, and decrease pharyngeal residue. Goal 4: Patient will complete orientation and immediate/delayed/working memory tasks with 80% accuracy and moderate cuing in order to improve retention of novel information in current environment. Goal 5: Patient will complete verbal/visual reasoning and safety problem solving tasks with 80% accuracy and moderate cuing in order to improve independent completion of IADLs. Goal 6: Patient will complete executive functioning tasks (i.e., medications, finances, scheduling) with 80% accuracy and moderate cuing in order to improve independent completion of IADLs with support of family. Plan of Care: Pt to be seen by speech therapy services 30 minutes per day Monday through Friday . Anticipated interventions may include speech/language/communication therapy, cognitive training, group therapy, education, and/or dysphagia therapy based on the above goals. CASE MANAGEMENT:  Goals:   Assist patient/family with discharge planning, patient/family counseling,  and coordination with insurance during the inpatient rehabilitation stay. Other members of the multidisciplinary rehabilitation team that will be involved in the patient's plan of care include recreational therapy, dietary, respiratory therapy, and neuropsychology.     Medical issues being managed closely and that require 24 hour availability of a physician:  Swallowing precautions, Bowel/Bladder function, Infection protection, DVT prophylaxis, Fall precautions, Fluid/Electrolyte balance, Nutritional status, Respiratory needs, Anemia and History of heart disease                                           Physician anticipated functional outcomes: Improved independence with functional measures   Estimated length of stay for this admission : probably 2~3 weeks  Medical Prognosis: Good  Anticipated disposition: Home. The potential to achieve the above medical and rehabilitative goals is good. This plan of care has been developed with the assistance and input of the multidisciplinary rehabilitation team.  The plan was reviewed with the patient. The patient has had the opportunity to provide input to the therapy team.    I have reviewed this Individualized Plan of Care and agree with its contents. Above documentation has been expanded, modified, adjusted to reflect the findings of my evaluations and goals for the patient.     Physician:  Wilton Parikh MD

## 2022-01-17 LAB — GLUCOSE BLD-MCNC: 144 MG/DL (ref 70–108)

## 2022-01-17 PROCEDURE — 2500000003 HC RX 250 WO HCPCS: Performed by: PHYSICAL MEDICINE & REHABILITATION

## 2022-01-17 PROCEDURE — 97116 GAIT TRAINING THERAPY: CPT

## 2022-01-17 PROCEDURE — 97129 THER IVNTJ 1ST 15 MIN: CPT

## 2022-01-17 PROCEDURE — 97530 THERAPEUTIC ACTIVITIES: CPT

## 2022-01-17 PROCEDURE — 97130 THER IVNTJ EA ADDL 15 MIN: CPT

## 2022-01-17 PROCEDURE — 97110 THERAPEUTIC EXERCISES: CPT

## 2022-01-17 PROCEDURE — 1180000000 HC REHAB R&B

## 2022-01-17 PROCEDURE — 99232 SBSQ HOSP IP/OBS MODERATE 35: CPT | Performed by: PHYSICAL MEDICINE & REHABILITATION

## 2022-01-17 PROCEDURE — 97535 SELF CARE MNGMENT TRAINING: CPT

## 2022-01-17 PROCEDURE — 6370000000 HC RX 637 (ALT 250 FOR IP): Performed by: PHYSICAL MEDICINE & REHABILITATION

## 2022-01-17 PROCEDURE — 82948 REAGENT STRIP/BLOOD GLUCOSE: CPT

## 2022-01-17 PROCEDURE — 97112 NEUROMUSCULAR REEDUCATION: CPT

## 2022-01-17 PROCEDURE — 6360000002 HC RX W HCPCS: Performed by: PHYSICAL MEDICINE & REHABILITATION

## 2022-01-17 RX ORDER — GLIPIZIDE 5 MG/1
5 TABLET ORAL 2 TIMES DAILY WITH MEALS
Status: DISCONTINUED | OUTPATIENT
Start: 2022-01-17 | End: 2022-01-28 | Stop reason: HOSPADM

## 2022-01-17 RX ADMIN — CALCIUM POLYCARBOPHIL 625 MG: 625 TABLET, FILM COATED ORAL at 08:51

## 2022-01-17 RX ADMIN — ASPIRIN 81 MG CHEWABLE TABLET 81 MG: 81 TABLET CHEWABLE at 08:51

## 2022-01-17 RX ADMIN — ENOXAPARIN SODIUM 30 MG: 100 INJECTION SUBCUTANEOUS at 08:51

## 2022-01-17 RX ADMIN — GLIPIZIDE 5 MG: 5 TABLET ORAL at 05:56

## 2022-01-17 RX ADMIN — ENOXAPARIN SODIUM 30 MG: 100 INJECTION SUBCUTANEOUS at 20:19

## 2022-01-17 RX ADMIN — GLIPIZIDE 5 MG: 5 TABLET ORAL at 17:00

## 2022-01-17 RX ADMIN — FUROSEMIDE 40 MG: 40 TABLET ORAL at 08:51

## 2022-01-17 RX ADMIN — FAMOTIDINE 20 MG: 20 TABLET, FILM COATED ORAL at 08:51

## 2022-01-17 RX ADMIN — MICONAZOLE NITRATE: 2 POWDER TOPICAL at 09:05

## 2022-01-17 RX ADMIN — DOCUSATE SODIUM 100 MG: 100 CAPSULE, LIQUID FILLED ORAL at 20:19

## 2022-01-17 RX ADMIN — MICONAZOLE NITRATE: 2 POWDER TOPICAL at 20:22

## 2022-01-17 RX ADMIN — FAMOTIDINE 20 MG: 20 TABLET, FILM COATED ORAL at 20:19

## 2022-01-17 RX ADMIN — ROSUVASTATIN 10 MG: 10 TABLET, FILM COATED ORAL at 20:19

## 2022-01-17 RX ADMIN — FLUCONAZOLE 200 MG: 200 TABLET ORAL at 08:51

## 2022-01-17 RX ADMIN — DOCUSATE SODIUM 100 MG: 100 CAPSULE, LIQUID FILLED ORAL at 08:51

## 2022-01-17 ASSESSMENT — ENCOUNTER SYMPTOMS
VOMITING: 0
DIARRHEA: 0
CONSTIPATION: 0
RHINORRHEA: 0
ABDOMINAL PAIN: 0
SHORTNESS OF BREATH: 0
BACK PAIN: 0
COUGH: 0
SORE THROAT: 0
NAUSEA: 0
WHEEZING: 0
TROUBLE SWALLOWING: 0

## 2022-01-17 ASSESSMENT — PAIN SCALES - GENERAL
PAINLEVEL_OUTOF10: 0
PAINLEVEL_OUTOF10: 0

## 2022-01-17 NOTE — PROGRESS NOTES
Patient: Marilyn Tolentino  Unit/Bed: 1C-99/253-O  YOB: 1950  MRN: [id number] Acct: [de-identified]   Admitting Diagnosis: Personal history of COVID-19 [Z86.16]  Admit Date:  1/14/2022  Hospital Day: 3    Assessment:     Principal Problem:    Critical illness myopathy  Active Problems:    Debility    Physical deconditioning    History of COVID-19    Dysarthria    Primary hypertension    Type 2 diabetes mellitus (HCC)    Obesity (BMI 30-39. 9)    History of coronary artery disease    History of coronary angioplasty with insertion of stent    Cardiomyopathy ([facility] [address]  Resolved Problems:    * No resolved hospital problems. *      Plan:     Follow the CS/ her monitor        Subjective:     Patient has no complaint of CP or SOB. .   Medication side effects: none    Scheduled Meds:   glipiZIDE  5 mg Oral BID WC    enoxaparin  30 mg SubCUTAneous Q12H    aspirin  81 mg Oral Daily    docusate sodium  100 mg Oral BID    famotidine  20 mg Oral BID    fluconazole  200 mg Oral Daily    furosemide  40 mg Oral Daily    polycarbophil  625 mg Oral Daily    rosuvastatin  10 mg Oral Daily    miconazole   Topical BID     Continuous Infusions:   dextrose       PRN Meds:polyethylene glycol, senna, dextrose, acetaminophen, albuterol sulfate HFA, bisacodyl, dextrose, dextrose, glucagon (rDNA), glucose, hydrOXYzine, magnesium hydroxide, melatonin, ondansetron, nitroGLYCERIN    Review of Systems  Pertinent items are noted in HPI. Objective:     No data found. I/O last 3 completed shifts:   In: 5 [P.O.:420]  Out: 1000 [Urine:1000]  I/O this shift:  In: 240 [P.O.:240]  Out: -     /67   Pulse 88   Temp 98.1 °F (36.7 °C) (Oral)   Resp 20   Ht 5' 2\" (1.575 m)   Wt 200 lb 3.2 oz (90.8 kg)   LMP  (LMP Unknown)   SpO2 95%   Breastfeeding No   BMI 36.62 kg/m²     General appearance: alert, appears stated age and cooperative  Head: Normocephalic, without obvious abnormality, atraumatic  Lungs: clear to auscultation bilaterally  Heart: regular rate and rhythm, S1, S2 normal, no murmur, click, rub or gallop  Abdomen: soft, non-tender; bowel sounds normal; no masses,  no organomegaly  Extremities: extremities normal, atraumatic, no cyanosis or edema  Skin: Skin color, texture, turgor normal. No rashes or lesions  Neurologic: weak    Data Review:   Results for Jigar Vera (MRN [id number]) as of 1/17/2022 17:17   Ref.  Range 1/15/2022 09:12 1/15/2022 12:26 1/15/2022 17:24 1/16/2022 13:40 1/17/2022 08:09   POC Glucose Latest Ref Range: 70 - 108 mg/dl  134 (H) 174 (H) 128 (H) 144 (H)       Electronically signed by Nikole Uribe MD on 1/17/2022 at 5:16 PM

## 2022-01-17 NOTE — PROGRESS NOTES
[address] [address]  Acute Inpatient Rehab Preadmission Assessment     Patient Name: Stevenson Smith        MRN:   [id number]    : 1950  (73 y.o.)  Gender: female      Admitted from:[address]  Initial Assessment     Date of admission to the hospital: 2021 11:57 AM  Date patient eligible for admission:2022     Primary Diagnosis: Critical illness myopathy      Did patient have surgery? no     Physicians: Lucila Chiu MD, Dr. Kayla Mcnulty, Dr. Meron Tay, Dr. Luis Calderon for clinical complications/co-morbidities:   Past Medical History[]Expand by Default   No past medical history on file.        Financial Information  Primary insurance: Medicare     Secondary Insurance:   Medical [place]      Has the patient had two or more falls in the past year or any fall with injury in the past year? no     Did the patient have major surgery during the 100 days prior to admission? no     Precautions: Restrictions/Precautions: General Precautions,Fall Risk  Other position/activity restrictions: out of COVID isolation 22, monitor BP       Isolation Precautions: None                  Physiatrist: Dr. Kayla Mcnulty     Patients Occupation: Retired     Reviewed Lab and Diagnostic reports from Current Admission: Yes     Patients Prior Functional  Level: Prior Function  ADL Assistance: Independent  Homemaking Assistance: Independent  Ambulation Assistance: Independent  Transfer Assistance: Independent  Additional Comments: Pt fully I prior to admission, 6 kids     Current functional status for upper extremity ADLs: Minimal assistance     Current functional status for lower extremity ADLs: Moderate assistance     Current functional status for bed, chair, wheelchair transfers: Moderate assistance     Current functional status for toilet transfers:  Moderate assistance     Current functional status for locomotion: N/T     Current functional status for bladder management: Moderate assistance     Current functional status for bowel management:Moderate assistance     Current functional status for comprehension: Moderate assistance     Current functional status for expression: Moderate assistance     Current functional status for social interaction: Moderate assistance     Current functional status for problem solving: Moderate assistance     Current functional status for memory: Moderate assistance     Expected level of Improvement in Self-Care:  Modified independence     Expected level of Improvement in Sphincter Control:  Modified independence     Expected level of Improvement in Transfers: Modified independence     Expected level of Improvement in Locomotion:  Modified independence     Expected level of Improvement in Communication and Social Cognition: Modified independence     Expected length of time to achieve that level of improvement: 2 weeks     Current rehab issues: ADL dysfunction,bladder management,bowel management, carry over of therapy techniques, discharge planning, disease and co-morbidity management, gait/mobility dysfunction, medication management, nutrition and hydration management, Ongoing assessment of safety, Pain management, Patient and family education, Prevention of secondary complications, Skin Integrity, cognitive impairment, communication impairment.     Required therapy: Physical Therapy, Occupational Therapy and Speech Therapy 3 hours per day, 5-6 days per week. Recreational Therapy 1 hour per week.     Expected Discharge Destination: Home     Expected Post Discharge Treatments: Home Care     Other information relevant to the care needs:   Lives With: Spouse  Type of Home: House  Home Layout: One level  Home Equipment:  (None)  ADL Assistance: Independent  Homemaking Assistance: Independent  Ambulation Assistance: Independent  Transfer Assistance: Independent  Active :  Yes  Additional Comments: Pt fully I prior to admission, 6 kids     Acute Inpatient Rehabilitation Disclosure Statement provided to patient.   Patient verbalized understanding.      I have reviewed and concur with the findings and results of the pre-admission screening assessment completed by the Inpatient Rehabilitation Admissions Coordinator.     Luis Weller MD                   Revision History    Date/Time User Provider Type Action   1/14/2022 10:34 AM Juan Miguel Santiago MD Physician Sign   1/14/2022  9:29 AM Magnolia Newby, RN Registered Nurse Sign    View Details Report

## 2022-01-17 NOTE — PROGRESS NOTES
[address] [address]  INPATIENT PHYSICAL THERAPY  DAILY NOTE  [address] [place] [address]    Time In: 1130  Time Out: 1230  Timed Code Treatment Minutes: 60 Minutes  Minutes: 60          Date: 2022  Patient Name: Jourdan Trevino,  Gender:  female        MRN: [id number]  : 1950  (73 y.o.)     Referring Practitioner: Nasim Diallo MD  Diagnosis: Personal history of COVID-19  Additional Pertinent Hx: Per Acute notes, \"Kaleigh Arzola is a 53-year-old white female who presented to [facility] on 2021 with complaints of shortness of breath she has a past medical history lifetime non-smoker, CAD with stent placement in , hypertension, diabetes mellitus, dyslipidemia. Per report she presented to [facility] on  with complaints of hypoxia and shortness of breath. She was initially placed on 4 L nasal cannula.  patient had persistent hypoxia and was transitioned to high flow nasal cannula. On  patient was transitioned to BiPAP and transferred to [facility] for intubation. Pt extubated . \"     Prior Level of Function:  Lives With: Spouse  Type of Home: House  Home Layout: One level  Home Access: Stairs to enter without rails  Entrance Stairs - Number of Steps: 1 step (very small)  Home Equipment:  (none)   Bathroom Shower/Tub: Walk-in shower (+ jaquazi)  Bathroom Toilet: Handicap height  Bathroom Equipment: Built-in shower seat (pt stating she has a bench right outside shower as well to sit on dry off)  Bathroom Accessibility: Accessible    ADL Assistance: Independent  Homemaking Assistance: Independent  Ambulation Assistance: Independent  Transfer Assistance: Independent  Active : Yes  IADL Comments: 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  Pt stating her children all live close and would be willing to help. Spouse in good health as well to assist.    Restrictions/Precautions:  Restrictions/Precautions: General Precautions,Fall Risk  Position Activity Restriction  Other position/activity restrictions: out of COVID isolation 1/6/22, monitor BP     SUBJECTIVE: Patient laying in bed upon arrival. Patient required increased time to wake due to increased complaints of lethargy. Patient is pleasant and agreeable to therapy. Patient spouse present during session. Patient required redirection at times due to patient very talkative. PAIN: 0/10:    Vitals: Oxygen monitored throughout session on room air and able to maintain 92%-94% throughout session    OBJECTIVE:  Bed Mobility:  Supine to Sit: Moderate Assistance, with head of bed raised, with verbal cues , with increased time for completion    Transfers:  Sit to Stand: Contact Guard Assistance, Minimal Assistance, with increased time for completion, cues for hand placement, with verbal cues  Stand to [facility], Minimal Assistance, with increased time for completion, cues for hand placement, with verbal cues  -Patient instructed in sit to stand transfers from wheelchair with education provided on BUE support on wheelchair to push up and for anterior weightshifting for COG over DOMENICO due to increased posterior trunk lean with transfers. Patient performed x5 reps. Patient demonstrates poor eccentric lowering control with max verbal cueing required for slowed controlled descent for safety. To/From Bed and Chair: Minimal Assistance, with increased time for completion, with verbal cues  -Patient required verbal cueing for slow speed with turning and for maintaining upright posture due to posterior trunk lean with turning to chair    Ambulation:  Minimal Assistance  Distance: 3 feet  Surface: Level Tile  Device:Rolling Walker  Gait Deviations:   Forward Flexed Posture, Decreased Step Length Bilaterally, Decreased Gait Speed, Decreased Heel Strike Bilaterally, Moderate Path Deviations, Unsteady Gait and decreased step height bilaterally with verbal cueing required for maintaining upright posture due to posterior trunk lean at times  -Patient initially hesitant to perform ambulation with shuffling feet and decreased step length bilaterally however at end of session patient able to demonstrate increased toe clearance with reduced shuffling gait     Neuromuscular Reeducation:  -Patient instructed in standing forward/backward stepping with BLE's with CGA and patient required verbal cueing for weight shifting laterally in order to perform hip and knee flexion for B foot clearance. Patient able to performed ~4 reps with BLE's prior to requiring rest break and performed x3 sets. Exercise:  Patient was guided in 1 set(s) 15 reps of exercise to both lower extremities. Seated marches, Seated hamstring curls, Seated heel/toe raises, Long arc quads, Seated isometric hip adduction and Seated abduction/adduction. Exercises were completed for increased independence with functional mobility. Functional Outcome Measures: Not completed       ASSESSMENT:  Assessment: Patient progressing toward established goals. Activity Tolerance:  Patient tolerance of  treatment: fair. Equipment Recommendations: Other: will monitor for needs pending progress and DC destination  Discharge Recommendations: Continue to assess pending progress and Patient would benefit from continued PT at discharge  Plan: Times per week: 5x/wk 90 min; 1x/wk 30 min  Current Treatment Recommendations: Strengthening,Positioning,Patient/Caregiver Education & Training,Safety Education & Training,Balance Training,Endurance Training,Functional Mobility Training,Neuromuscular Re-education,Gait Training,Transfer Training,Home Exercise Program,Equipment Evaluation, Education, & procurement    Patient Education  Patient Education: [employer], Transfers, Gait, Verbal Exercise Instruction,  - Patient Verbalized Understanding, - Patient Requires Continued Education    Goals:  Patient goals : to get stronger  Short term goals  Time Frame for Short term goals: 1 week  Short term goal 1: Pt to be CGA for supine <> sit to get in/out of bed  Short term goal 2: Pt to be CGA for sit <> stand to get up to ambulate/transfer  Short term goal 3: Pt to be CGA for stand-pivot with RW to get between seats  Short term goal 4: Pt to ambulate > 10 ft with RW with Min A to get to bathroom  Short term goal 5: Pt to complete car transfer with Min A for transportation needs  Long term goals  Time Frame for Long term goals : 3 weeks  Long term goal 1: Pt to be Supervision for supine <> sit to get in/out of bed  Long term goal 2: Pt to be Supervision for sit <> stand to get up to ambulate/transfer  Long term goal 3: Pt to be Supervision for stand-pivot with RW to get between seats  Long term goal 4: Pt to ambulate > 50 ft with RW with SBA for household distances  Long term goal 5: Pt to complete car transfer with Supervision for transportation needs    Following session, patient left in safe position with all fall risk precautions in place.

## 2022-01-17 NOTE — PROGRESS NOTES
Comprehensive Nutrition Assessment    Type and Reason for Visit:  Initial,Consult (nutrition assessment - rehab admit)    Nutrition Recommendations/Plan:   *continue diet textures as per SLP recommendations  *ONS stopped (glucerna) per patient request due to CHO amount    Nutrition Assessment:    Pt. nutritionally compromised AEB debility due to recent covid. At risk for further nutrition compromise r/t admitted with personal hx of covid, symptom onset 12/12/21 (SOB, dry heaves, diarrhea), +covid 12/17/21 (at out lying facility), admit to [facility] on 12/18/21, intubated 12/20/21, extubated 1/2/22, rehab admit 1/14/22, altered diet textures due to dysphagia, and underlying medical condition (hx: CAD, HTN, DM with Hgb A1C of 9.9 on 12/21/21). Malnutrition Assessment:  Malnutrition Status:  No malnutrition    Context:  Acute Illness     Findings of the 6 clinical characteristics of malnutrition:  Energy Intake:  No significant decrease in energy intake  Weight Loss:  No significant weight loss     Body Fat Loss:  No significant body fat loss     Muscle Mass Loss:  No significant muscle mass loss    Fluid Accumulation:  1 - Mild Extremities,Generalized   Strength:  Not Performed    Estimated Daily Nutrient Needs:  Energy (kcal):  6924-4763 (30-32 kcals/kg) late phase covid; Weight Used for Energy Requirements:  Ideal (50kg)     Protein (g):  100 (2.0 grams/kg); Weight Used for Protein Requirements:  Ideal (50kg)          Nutrition Related Findings:    Patient seen this morning with breakfast tray. Noted bited sized foods. MBS 1/4/22 with soft and bite sized diet. Patient reports good tolerance and good appetite. Intake noted to be %. Patient has Lena Guild and states that she is throwing them away. Patient explains that due to the [facility] amount in them she is not drinking them (26 grams CHO each). Patient reports +bowel movements. Last documented BM 1/17/22. POC: 128.    Meds reviewed: glucotrol, lasix, fibercon, colace. Wounds:   (stage 1 coccyx, finger abrasion)       Current Nutrition Therapies:    ADULT DIET; Dysphagia - Soft and Bite Sized; 4 carb choices (60 gm/meal)    Anthropometric Measures:  · Height: 5' 2\" (157.5 cm)  · Current Body Weight: 200 lb (90.7 kg) (1/17/22; bedscale; +1 pitting edema, non-pitting generalized edema)   · Admission Body Weight: 189 lb (85.7 kg) (1/16/22; bedscale)    · Usual Body Weight: 190 lb (86.2 kg) (EMR; 12/18/21; bedscale)     · Ideal Body Weight: 110 lbs;   · BMI: 36.6  · Adjusted Body Weight:  ; No Adjustment    · BMI Categories: Obese Class 2 (BMI 35.0 -39.9)       Nutrition Diagnosis:   · Increased nutrient needs related to catabolic illness as evidenced by  (late phase covid)    Nutrition Interventions:   Food and/or Nutrient Delivery:  Continue Current Diet,Discontinue Oral Nutrition Supplement  Nutrition Education/Counseling:  No recommendation at this time   Coordination of Nutrition Care:  Continue to monitor while inpatient,Speech Therapy    Goals:  Patient will consume 75% or greater of meals during LOS       Nutrition Monitoring and Evaluation:   Behavioral-Environmental Outcomes:  None Identified   Food/Nutrient Intake Outcomes:  Diet Advancement/Tolerance,Food and Nutrient Intake  Physical Signs/Symptoms Outcomes:  Biochemical Data,Chewing or Swallowing,GI Status,Fluid Status or Edema,Meal Time Behavior,Nutrition Focused Physical Findings,Skin,Weight     Discharge Planning:     Too soon to determine     Electronically signed by Aleena Mendoza RD, LD on 1/17/22 at 12:01 PM EST    Contact: [phone]

## 2022-01-17 NOTE — PLAN OF CARE
[address] [address]  Physical Medicine Case Management Assessment    [x] Inpatient Rehabilitation Unit  [] Transitional Care Unit    Patient Name: Stevenson Smith        MRN: [id number]    : 1950  (73 y.o.)  Gender: female   Date of Admission: 2022  2:12 PM    Family/Social/Home Environment: Social/Functional History    Prior to admission patient was independent not using any devices and living with spouse. Patient works at [employer] and support system is made up of spouse and daughters Kimberly and Marcella Lai. Patient sees someone from support system daily. Family doctor is Dr. Akosua Esparza and patient takes a baby Asprin daily and is diabetic. Family doctor monitors both the baby Asprin and the diabetes. Pharmacy of preference is [facility] in [place]. Patients biggest concern is walking and after discharge if patient has driving restrictions patients spouse will manage the driving. 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 Pt stating her children all live close and would be willing to help. Spouse in good health as well to assist.    Contact/Guardian Information:      [employer] Utilized: No community resources utilized prior to admission.     Sexuality/Intimacy: No issues or concerns identified at time of SW assessment. Complementary Health Approaches: Patient with no current interest in complementary health approaches at time of SW assessment. Anticipated Needs/Discharge Plans: Anticipate patient would benefit from continued therapy upon discharge.           Discharge Planning  Living Arrangements: Spouse/Significant Other  Support Systems: Spouse/Significant Other,Friends/Neighbors,Children,Family Members  Potential Assistance Needed: N/A  Potential Assistance Purchasing Medications: No  Type of Home Care Services: None  [facility]  Expected Discharge Date:  (Undetermined)  Follow Up Appointment: Best Day/Time : Monday AM      KIARRA Pereira 1/17/2022 1:12 PM  Electronically signed by KIARRA Pereira on 1/17/2022 at 1:22 PM

## 2022-01-17 NOTE — PROGRESS NOTES
[address] [facility] THERAPY  [address] [facility]  DAILY NOTE    TIME   SLP Individual Minutes  Time In: 0930  Time Out: 1000  Minutes: 30  Timed Code Treatment Minutes: 30 Minutes       Date: 2022  Patient Name: Mandeep Roberson      CSN: [id number]   : 1950  (73 y.o.)  Gender: female   Referring Physician:  Shiv Cason MD  Diagnosis: Personal history of COVID-19  Secondary Diagnosis: Dysphagia, Cognitive Impairment  Precautions: Aspiration Risk, Fall Risk  Current Diet: Soft and Bite Size with Thin  Swallowing Strategies: Full Upright Position, Small Bite/Sip, Pulmonary Monitoring, Alternate Solids and Liquids, Limit Distractions and Monitor for Fatigue           Date of Last MBS/FEES: MBS 2022    Pain:  No pain reported. Subjective:  Patient seen sitting upright in wheelchair upon ST arrival; alert, pleasant, and cooperative with ST. Patient's  entered during session with inquiry related to patient diet level; ST with rationale provided for current diet level with plans for diet analysis and advanced trials to be completed 2022. Short-Term Goals:  SHORT TERM GOAL #1:  Goal 1: Patient wll consume a soft and bite-size diet with thin liquids while implementing recommended swallow strategies with no overt s/s of aspiration and adequate endurance to assist with meeting nutrition/hydration needs. INTERVENTIONS: DNT due to focus on additional STGs. SHORT TERM GOAL #2:  Goal 2: Patient will consume advanced PO trials with ST while demonstrating timely/coordinated mastication, complete bolus clearance, no overt s/s of aspiration, and adeqaute endurance for potential diet advancement to least restrictive diet  INTERVENTIONS: DNT due to focus on additional STGs.     SHORT TERM GOAL #3:  Goal 3: Patient will complete pharyngeal strengthening exercises x10-15 (effortful swallow, chris) to improve overall timeliness of swallow, airway protection, and decrease pharyngeal residue. INTERVENTIONS: DNT due to focus on additional STGs. SHORT TERM GOAL #4:  Goal 4: Patient will complete orientation and immediate/delayed/working memory tasks with 80% accuracy and moderate cuing in order to improve retention of novel information in current environment. INTERVENTIONS:  Recall of ST Information (5 Units)  Immediate Recall: 4/5 independent, 1/5 with logical cuing  10 Minute Delayed Recall: 5/5 independent  20 Minute Delayed Recall: 5/5 independent  *Excellent given compensatory strategy of repetition. SHORT TERM GOAL #5:  Goal 5: Patient will complete verbal/visual reasoning and safety problem solving tasks with 80% accuracy and moderate cuing in order to improve independent completion of IADLs. INTERVENTIONS:  Safety Problem Solving with Picture Cards  ID Problem: 20/20 independent  ID Solution: 17/20 independent, 3/20 with logical cuing  *Excellent completion of task    SHORT TERM GOAL #6:  Goal 6: Patient will complete executive functioning tasks (i.e., medications, finances, scheduling) with 80% accuracy and moderate cuing in order to improve independent completion of IADLs with support of family. INTERVENTIONS: DNT due to focus on additional STGs. Long-Term Goals:  Timeframe for Long-term Goals: 6 weeks    LONG TERM GOAL #1:  Goal 1: Patient will safely consume regular diet with thin liquids with implementation of compensatory swallowing strategies and withOUT overt s/s of aspiration in order to maintain adequate nutrition and hydration measures. LONG TERM GOAL #2:  Goal 2: Patient will improve cognitive functioning to at least a modified independent level in order to improve safety of IADL completion upon potential discharge to home environment.       Comprehension: 5 - Patient understands basic needs (hungry/hot/pain)  Expression: 5 - Expresses basic ideas/needs only (hungry/hot/pain)  Social Interaction: 5 - Patient is appropriate with supervision/cues  Problem Solvin - Patient solves simple/routine tasks 75-90%+   Memory: 3 - Patient remembers 50%-74% of the time    EDUCATION:  Learner: Patient and Significant Other  Education:  Reviewed diet and strategies, Reviewed ST goals and Plan of Care and Reviewed recommendations for follow-up  Evaluation of Education: Verbalizes understanding    ASSESSMENT/PLAN:  Activity Tolerance:  Patient tolerance of  treatment: good. Assessment/Plan: Patient progressing toward established goals. Continues to require skilled care of licensed speech pathologist to progress toward achievement of established goals and plan of care. .     Plan for Next Session: Diet Analysis/Advanced Trials     Dolores Perry M.S., [facility]

## 2022-01-17 NOTE — PROGRESS NOTES
[address] [address]  Recreational Therapy  Inpatient Rehabilitation Evaluation        Time Spent with Patient: 25 minutes    Date:  1/17/2022       Patient Name: José Miguel Chavira      MRN: [id number]       YOB: 1950 (73 y.o.)       Gender: female  Diagnosis: personal history of COVID 23  Referring Practitioner: Dr. 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

## 2022-01-17 NOTE — PROGRESS NOTES
[facility]  Inpatient Rehabilitation  Occupational Therapy  Daily Note   Time:  Time In: 0700  Time Out: 0830  Timed Code Treatment Minutes: 90 Minutes  Minutes: 90          Date: 2022  Patient Name: Kirill Rosa,   Gender: female      Room: [facility]68/068-A  MRN: [id number]  : 1950  (73 y.o.)  Referring Practitioner: Dr. Ana Fuller  Diagnosis: personal history of COVID 19  Additional Pertinent Hx: Sweta Ramos is a 82-year-old white female who presented to [facility] on 2021 with complaints of shortness of breath she has a past medical history lifetime non-smoker, CAD with stent placement in , hypertension, diabetes mellitus, dyslipidemia. Per report she presented to [facility] on  with complaints of hypoxia and shortness of breath. She was initially placed on 4 L nasal cannula.  patient had persistent hypoxia and was transitioned to high flow nasal cannula. On  patient was transitioned to BiPAP and transferred to [facility] ICU for intubation. Pt extubated . Restrictions/Precautions:  Restrictions/Precautions: General Precautions,Fall Risk  Position Activity Restriction  Other position/activity restrictions: out of COVID isolation 22, monitor BP    SUBJECTIVE: Pt supine in bed, agreeable to OT. Very talkative throughout session. PAIN: occasional sciatic pain, but denies today during session /10:      Vitals: Blood sugar: 144    Pulse: 100 bpm   Pulse 0x: 92% during activity, 95% during rest,  On room air     COGNITION: Decreased Recall and Inattention    ADL:   Feeding: with set-up to open pepper packets, patient opened her own straw, milk and lid. Grooming: Stand By Assistance and with set-up. to wash hair, style with curling gel,  UE weaknesss noted prompting patient to need frequent breaks during task. A needed to squeeze bottles due to poor  strenth   Bathing: Minimal Assistance.   A to wash margarita feetl and buttocks in the shower,  completed sitting on shower chair   Upper Extremity Dressing: Stand By Assistance. Donning long sleeve shirt   Lower Extremity Dressing: Moderate Assistance. To don pants and socks,  Introduced dressing stick, sock aid and LH shoe horn. Repetition needed for recall and problem solving of use,   Shower Transfer: Minimal Assistance. Ambulating to 2 feet into shower with CGA/ light min A to control decent onto shower chair. Lengthy time needed for showering and ADL routine due to patient requiring frequent rest breaks due to fatigue, explanation and repetition of LHAE use and cues for attention to task. Pt very appreciative of a shower. BALANCE:  Sitting Balance:  Stand By Assistance. Standing Balance: Contact Guard Assistance. during unilateral release during ADLs     BED MOBILITY:  Supine to Sit: Moderate Assistance with HOB at 20 degrees     TRANSFERS:  Sit to Stand:  Contact Guard Assistance, Minimal Assistance, X 1, with increased time for completion, cues for hand placement. Stand to Sit: Contact Guard Assistance, Minimal Assistance, X 1, with increased time for completion, cues for hand placement, with verbal cues, to/from chair with arms, to/from chair without arms. Stand pivot: Contact guard assistance from bed to wheelchair,  Increased time needed     FUNCTIONAL MOBILITY:  Assistive Device: Rolling Walker  Assist Level:  Minimal Assistance. Distance: 2 feet to and from shower chair      ASSESSMENT:  Activity Tolerance:  Patient tolerance of  treatment: good.         Discharge Recommendations: Continue to assess pending progress,Home with Home health OT  Equipment Recommendations: Equipment Needed: Yes  Mobility Devices: Walker  Plan: Times per week: 5x week/90 min, 1x week/30 min  Current Treatment Recommendations: Strengthening,Balance Training,Functional Mobility Training,Endurance Training,Home Management Training,Patient/Caregiver Education & Training,Self-Care / ADL,Safety Education & Training    Patient Education  Patient Education: Role of OT, Plan of Care, ADL's and Equipment Education    Goals  Short term goals  Time Frame for Short term goals: 1 week  Short term goal 1: Pt to increase standing endurance to tolerate > 1 min with bilateral UE support and CGA in prep for hand release to complete clothign management  Short term goal 2: Pt to complete LB ADL tasks with use LHAE as needed with mod A  Short term goal 3: Pt will complete BUE AROM greater than 90 degrees with no > than Min A and min VC to increase shoulder flexion to reach outside base of support for needed object to complete ADL tasks  Short term goal 4: Pt to complete stand pivot transfers with min A consistently and no no cues for safety to increase indep with completing toilet transfers  Short term goal 5: Pt to participate in further assessment of mobility with OTR as her strength and endurance increases to increase indep with ADL tasks  Long term goals  Time Frame for Long term goals : 3 week  Long term goal 1: Pt to complete BADL routine with SBA and no cues for safety  Long term goal 2: Pt to complete sit to stand transfers from various surfaces including toilet with SBA and no ceus for safety  Long term goal 3: Pt to demo standing balance with unilateral UE handn release with SBA to complete clothign management after dressign and toielting    Following session, patient left in safe position with all fall risk precautions in place.

## 2022-01-17 NOTE — PROGRESS NOTES
[address] [facility] PHYSICAL THERAPY  DAILY NOTE  [address] [facility],4[address] Floor - 7E-68/068-A    Time In: 1400  Time Out: 1430  Timed Code Treatment Minutes: 30 Minutes  Minutes: 30          Date: 2022  Patient Name: Hodan Mendoza,  Gender:  female        MRN: [id number]  : 1950  (73 y.o.)     Referring Practitioner: Lou Phan MD  Diagnosis: Personal history of COVID-19  Additional Pertinent Hx: Per Acute notes, \"Kaleigh Arzola is a 61-year-old white female who presented to [facility] on 2021 with complaints of shortness of breath she has a past medical history lifetime non-smoker, CAD with stent placement in , hypertension, diabetes mellitus, dyslipidemia. Per report she presented to [facility] on  with complaints of hypoxia and shortness of breath. She was initially placed on 4 L nasal cannula.  patient had persistent hypoxia and was transitioned to high flow nasal cannula. On  patient was transitioned to BiPAP and transferred to [place] ICU for intubation. Pt extubated . \"     Prior Level of Function:  Lives With: Spouse  Type of Home: House  Home Layout: One level  Home Access: Stairs to enter without rails  Entrance Stairs - Number of Steps: 1 step (very small)  Home Equipment:  (none)   Bathroom Shower/Tub: Walk-in shower (+ jaquazi)  Bathroom Toilet: Handicap height  Bathroom Equipment: Built-in shower seat (pt stating she has a bench right outside shower as well to sit on dry off)  Bathroom Accessibility: Accessible    ADL Assistance: Independent  Homemaking Assistance: Independent  Ambulation Assistance: Independent  Transfer Assistance: Independent  Active : Yes  IADL Comments: 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  Pt stating her children all live close and would be willing to help. Spouse in good health as well to assist.    Restrictions/Precautions:  Restrictions/Precautions: General Precautions,Fall Risk  Position Activity Restriction  Other position/activity restrictions: out of COVID isolation 1/6/22, monitor BP     SUBJECTIVE: Patient seated in recliner upon arrival. Patient reporting increased complaints of fatigue after lunch and requesting all therapies to be done prior to lunch time tomorrow. Patient given extensive education on the importance of spreading out treatment sessions in order to assist with rest breaks during the day for increased ability to safely participate in all therapy sessions. Patient agreeable. Patient is pleasant and agreeable to therapy. PAIN: 0/10    Vitals: Vitals not assessed per clinical judgement, see nursing flowsheet    OBJECTIVE:  Bed Mobility:  Not Tested    Transfers:  Not Tested    Ambulation:  Not Tested        Exercise:  Patient was guided in 1 set(s) 15 reps of exercise to both lower extremities. Ankle pumps, Glut sets, Quad sets, Heelslides, Short arc quads, Hip abduction/adduction and Straight leg raises. Exercises were completed for increased independence with functional mobility. Functional Outcome Measures: Not completed       ASSESSMENT:  Assessment: Patient progressing toward established goals. Activity Tolerance:  Patient tolerance of  treatment: fair. Equipment Recommendations: Other: will monitor for needs pending progress and DC destination  Discharge Recommendations: Continue to assess pending progress and Patient would benefit from continued PT at discharge  Plan: Times per week: 5x/wk 90 min; 1x/wk 30 min  Current Treatment Recommendations: Strengthening,Positioning,Patient/Caregiver Education & Training,Safety Education & Training,Balance Training,Endurance Training,Functional Mobility Training,Neuromuscular Re-education,Gait Training,Transfer Training,Home Exercise Program,Equipment Evaluation, Education, & procurement    Patient Education  Patient Education: Plan of Care, Verbal Exercise Instruction,  - Patient Verbalized Understanding, - Patient Requires Continued Education    Goals:  Patient goals : to get stronger  Short term goals  Time Frame for Short term goals: 1 week  Short term goal 1: Pt to be CGA for supine <> sit to get in/out of bed  Short term goal 2: Pt to be CGA for sit <> stand to get up to ambulate/transfer  Short term goal 3: Pt to be CGA for stand-pivot with RW to get between seats  Short term goal 4: Pt to ambulate > 10 ft with RW with Min A to get to bathroom  Short term goal 5: Pt to complete car transfer with Min A for transportation needs  Long term goals  Time Frame for Long term goals : 3 weeks  Long term goal 1: Pt to be Supervision for supine <> sit to get in/out of bed  Long term goal 2: Pt to be Supervision for sit <> stand to get up to ambulate/transfer  Long term goal 3: Pt to be Supervision for stand-pivot with RW to get between seats  Long term goal 4: Pt to ambulate > 50 ft with RW with SBA for household distances  Long term goal 5: Pt to complete car transfer with Supervision for transportation needs    Following session, patient left in safe position with all fall risk precautions in place.

## 2022-01-17 NOTE — PROGRESS NOTES
[address] [facility]  Individualized Disclosure Statement      Patient: Mandeep Roberson      [facility] [address] provides 24 hour individualized service to patients with functional limitations due to, but not limited to: stroke, brain injury, spinal cord injury, major multiple trauma, fractures, amputation, and neurological disorders. The [address] [place] [address] provides rehabilitative nursing and medical services as well as physical, occupational, speech, and recreation therapies. [address] [facility] is fully accredited by the Commission on Accreditation of Rehabilitation Facilities (CARF) as a comprehensive provider of rehabilitation services. Patients admitted to the [address] [place] [address] receive a minimum of three hours of therapy per day, at least six days per week, with a revised therapy schedule on weekends and holidays. Physical therapy, occupational therapy, and speech therapy are provided seven days per week including holidays. Other therapeutic services are available on weekends and evenings as needed or scheduled. Intensity of Treatment  Your treatment program will consist of Nursing Care and:  1.5 hours of Physical Therapy, per day  1.5 hours of Occupational Therapy, per day   30-60 minutes of Speech Therapy, per day  1 hour of Recreational Therapy, per week    [facility] maintains contracts with most insurance plans. Depending on the type of coverage, the insurance may impose limits on the coverage for rehabilitation care. Coverage is based on the premise that you are able to fully participate in the rehabilitation program and show continued progress. Please verify your own insurance information A copy of this was given to the patient/ family on this date.   Insurance Coverage  Your insurance company has made the following determination relative to the length of your stay:   Your estimated length of stay is 14 days   Your insurance Coverage has been verified as follows:    Primary Insurance: Medicare   Deductible:  [id number] Coverage: Active  Secondary Insurance: [employer] [place]; secondary insurance policies often cover co-pay amounts, but to ensure payment please contact your insurance company.     Alternative Resources: Please ask the  for more information [phone]

## 2022-01-17 NOTE — PROGRESS NOTES
Physical Medicine & Rehabilitation Progress Note    Chief Complaint:  Fatigue and tired    Subjective:    Hodan Mendoza is a 73 y.o. right-handed obese  female with history of hypertension, diabetes mellitus type 2, coronary artery disease requiring coronary artery stenting, status post bilateral eyes cataract surgeries, status post 3  sections, status post hysterectomy, status post hiatal hernia repair, status post sinus surgery, was admitted on 2022 for intensive inpatient management of impairment & disability secondary to critical illness myopathy and disability/physical decondition as result of COVID-19 pneumonia. The patient says he does not quite remember how she ended up in the hospital.     The patient apparently began experiencing reduced upper size, change in taste and smell, shortness of breath, nausea, dry heaves, and diarrhea starting in 2021. The symptom progressively worsened. She was advised by her PCP to visit ER. Therefore she went to [facility] ER on 2021. She was found to be hypoxic with O2 saturation in 70s% in room air. COVID test was performed which turned out to be positive. She was advised to be admitted but she left against medical advice because she preferred to be admitted to [address] [place] [address] instead. On 2021 she came to [address] [place] [address] ER with complaint of shortness of breath. She was found to have oxygen saturation in the 80s% in room air. She was admitted with diagnosis of acute hypoxic respiratory failure secondary to COVID-19 pneumonia. She was treated with Decadron and Olumiant (baricitnib). However her condition deteriorated despite of treatment. She was intubated on 2021 and was admitted to ICU. Her hospital course also was complicated by superimposed Klebsiella oxytocin bacterial pneumonia, bradycardia requiring dopamine infusion, and decubitus ulcer. Her condition later stabilized and improved. She was extubated on 1/2/2022. The patient says she feels well today. She says she did not have a good sleep last night. She did not receive melatonin last night. This morning she also complains of some aching pain at his right buttock area with intensity rated at 6/10 level. She still feels fatigue with mild generalized weakness. Her bilateral hips still feel particularly weak. She denies having shortness of breath, cough, sore throat, tingling or numbness. She continues tolerating the intensive inpatient rehabilitation treatment well. Rehabilitation:  PT: Reviewed. Bed Mobility:  Supine to Sit: Moderate Assistance, with head of bed raised, with verbal cues , with increased time for completion     Transfers:  Sit to Stand: Contact Guard Assistance, Minimal Assistance, with increased time for completion, cues for hand placement, with verbal cues  Stand to [facility] 68, Minimal Assistance, with increased time for completion, cues for hand placement, with verbal cues  -Patient instructed in sit to stand transfers from wheelchair with education provided on BUE support on wheelchair to push up and for anterior weight shifting for COG over DOMENICO due to increased posterior trunk lean with transfers. Patient performed x5 reps. Patient demonstrates poor eccentric lowering control with max verbal cueing required for slowed controlled descent for safety. To/From Bed and Chair: Minimal Assistance, with increased time for completion, with verbal cues  -Patient required verbal cueing for slow speed with turning and for maintaining upright posture due to posterior trunk lean with turning to chair     Ambulation:  Minimal Assistance  Distance: 3 feet  Surface: Level Tile  Device:Rolling Walker  Gait Deviations:   Forward Flexed Posture, Decreased Step Length Bilaterally, Decreased Gait Speed, Decreased Heel Strike Bilaterally, Moderate Path Deviations, Unsteady Gait and decreased step height bilaterally with verbal cueing required for maintaining upright posture due to posterior trunk lean at times  -Patient initially hesitant to perform ambulation with shuffling feet and decreased step length bilaterally however at end of session patient able to demonstrate increased toe clearance with reduced shuffling gait      Neuromuscular Reeducation:  -Patient instructed in standing forward/backward stepping with BLE's with CGA and patient required verbal cueing for weight shifting laterally in order to perform hip and knee flexion for B foot clearance. Patient able to performed ~4 reps with BLE's prior to requiring rest break and performed x3 sets. OT: Reviewed. COGNITION: Decreased Recall and Inattention     ADL:   Feeding: with set-up to open pepper packets, patient opened her own straw, milk and lid. Grooming: Stand By Assistance and with set-up. to wash hair, style with curling gel,  UE weakness's noted prompting patient to need frequent breaks during task. A needed to squeeze bottles due to poor  strength   Bathing: Minimal Assistance. A to wash margarita feet and buttocks in the shower,  completed sitting on shower chair   Upper Extremity Dressing: Stand By Assistance. Donning long sleeve shirt   Lower Extremity Dressing: Moderate Assistance. To don pants and socks,  Introduced dressing stick, sock aid and LH shoe horn. Repetition needed for recall and problem solving of use,   Shower Transfer: Minimal Assistance. Ambulating to 2 feet into shower with CGA/ light min A to control decent onto shower chair. Lengthy time needed for showering and ADL routine due to patient requiring frequent rest breaks due to fatigue, explanation and repetition of LHAE use and cues for attention to task. Pt very appreciative of a shower.      BALANCE:  Sitting Balance:  Stand By Assistance. Standing Balance: Contact Guard Assistance.  during unilateral release during ADLs      BED MOBILITY:  Supine to Sit: Moderate Assistance with HOB at 20 degrees      TRANSFERS:  Sit to Stand:  Contact Guard Assistance, Minimal Assistance, X 1, with increased time for completion, cues for hand placement. Stand to Sit: Contact Guard Assistance, Minimal Assistance, X 1, with increased time for completion, cues for hand placement, with verbal cues, to/from chair with arms, to/from chair without arms. Stand pivot: Contact guard assistance from bed to wheelchair,  Increased time needed      FUNCTIONAL MOBILITY:  Assistive Device: Rolling Walker  Assist Level:  Minimal Assistance. Distance: 2 feet to and from shower chair        ST: Reviewed. Recall of ST Information (5 Units)  Immediate Recall: 4/5 independent, 1/5 with logical cuing  10 Minute Delayed Recall: 5/5 independent  20 Minute Delayed Recall: 5/5 independent  *Excellent given compensatory strategy of repetition.     Safety Problem Solving with Picture Cards  ID Problem: 20/20 independent  ID Solution: 17/20 independent, 3/20 with logical cuing  *Excellent completion of task      Review of Systems:  Review of Systems   Constitutional: Positive for fatigue. Negative for chills, diaphoresis and fever. HENT: Negative for hearing loss, rhinorrhea, sneezing, sore throat and trouble swallowing. Eyes: Negative for visual disturbance. Respiratory: Negative for cough, shortness of breath and wheezing. Cardiovascular: Negative for chest pain and palpitations. Gastrointestinal: Negative for abdominal pain, constipation, diarrhea, nausea and vomiting. Genitourinary: Negative for dysuria. Musculoskeletal: Positive for arthralgias (right hip) and gait problem. Negative for back pain, myalgias and neck pain. Skin: Negative for rash. Neurological: Positive for weakness (Generalized). Negative for dizziness, tremors, light-headedness, numbness and headaches. Psychiatric/Behavioral: Positive for sleep disturbance. Negative for dysphoric mood and hallucinations. The patient is not nervous/anxious.          Objective:  /83   Pulse 82   Temp 98.4 °F (36.9 °C) (Oral)   Resp 20   Ht 5' 2\" (1.575 m)   Wt 197 lb (89.4 kg) Comment: 197 lb  LMP  (LMP Unknown)   SpO2 94%   Breastfeeding No   BMI 36.03 kg/m²   Physical Exam   General:  well-developed, well nourished obese  female ; in no acute distress ; appropriate affect & mood; sitting on edge of bed comfortably  Eyes: pupil equally round ; extra-ocular motion intact bilaterally  Head, Ear, Nose, Mouth & Throat : normocephalic ; no discharge from ears or nose ; no deformity ; no facial swelling ; oral mucosa pink   Neck :  supple ; no tenderness ; no muscle spasm  Cardiovascular : regular rate & rhythm ; normal S1 & S2 heart sound ; no murmur ; normal peripheral pulse at bilateral upper & lower extremities  Pulmonary : Breath sounds present at bilateral lung fields; no wheezing ; no rale; no crackle  Gastrointestinal : soft, protruded abdomen without tenderness ; normal bowel sound present   Back : no tenderness; no muscle spasm  Skin: no skin lesion or rash ; no pitting edema at all 4 extremities  Musculoskeletal : no limb asymmetry; no limb deformity; tenderness at right upper buttock over the gluteus medius muscle; otherwise no tenderness at bilateral upper extremities & the rest of bilateral lower extremities; no palpable mass at limbs ; no joints laxity or crepitation ; hip flexion passive ROM reaching 90 degrees bilaterally; ankle dorsiflexion passive ROM reaching 5 degrees bilaterally; otherwise normal functional joints ROM at the rest of bilateral upper & lower extremities  Cerebral :  alert ; awake ; oriented to place, person and time; follow two-step verbal command  Cerebellum : no dysmetria with bilateral finger-to-nose test ; unable to perform  heel-to-shin test on both sides  Cranial Nerves :  grossly intact CN II to XII function  Sensory : intact light touch and pin prick sensation at bilateral upper & lower extremities  Motor : normal tone at bilateral upper & lower extremities ; 4+/5 to 5/5 muscle strength at bilateral shoulder flexion and abduction; 4+/5 to 5/5 muscle strength at the bilateral elbow flexion and extension; 4+5 muscle strength at the left fingers flexion, extension and abduction; 4+5 to 5/5 muscle strength at the right finger abduction; 3-/5 to 3+5 muscle strength at the right hip flexion; 3+/5 to 4-/5 muscle strength at the left hip flexion; 4-/5 to 4+/5 muscle strength at the right knee flexion; 4+/5 muscle strength at the left knee flexion; otherwise 5/5 muscle strength at the rest of bilateral upper and the lower extremities  Reflex : 0 bilateral biceps, bilateral brachioradialis and bilateral knees reflexes   Pathological Reflex :  No Roberta's sign ; no ankle clonus  Gait : Not assessed      Diagnostics:   Recent Results (from the past 24 hour(s))   POCT glucose    Collection Time: 01/18/22  7:54 AM   Result Value Ref Range    POC Glucose 171 (H) 70 - 108 mg/dl     Results for Mami Rae (MRN [id number]) as of 1/18/2022 08:32   Ref.  Range 1/15/2022 12:26 1/15/2022 17:24 1/16/2022 13:40 1/17/2022 08:09 1/18/2022 07:54   POC Glucose Latest Ref Range: 70 - 108 mg/dl 134 (H) 174 (H) 128 (H) 144 (H) 171 (H)         Impression:  · Critical illness myopathy   · Debility/physical decondition secondary to [id number] pneumonia, complicated by bacterial pneumonia, bradycardia, and decubitus ulcer  · History of COVID-19 pneumonia with acute hypoxic respiratory failure requiring intubation, complicated by Klebsiella oxytocin bacterial pneumonia  · Mild dysphagia/moderate pharyngeal dysphagia  · Mild cognitive impairment  · Hypertension  · Diabetes mellitus type 2  · History of coronary artery disease requiring coronary artery stenting  · Severe cardiomyopathy with reduced ejection fraction  · Obesity  · Right buttock pain likely due to gluteus medius muscle strain    The patient's condition is stable. She still have generalized weakness especially at her bilateral hips. She had difficulty sleeping last night but melatonin was not given. I will change melatonin to be given on schedule and increase the dosage to 6 mg. The patient also complains of right buttock pain likely due to gluteal muscle strain. We can try heat pad and Voltaren gel application for the buttock pain. She continues tolerating the intensive inpatient rehabilitation treatment well. Her function is improving slowly. Plan:  · Continue intensive PT/OT/SLP/RT inpatient rehabilitation program at least 3 hours per day, 5 days per week in order to improve functional status prior to discharge. Family education and training will be completed. Equipment evaluations and recommendations will be completed as appropriate. · Rehabilitation nursing continue to be involved for bowel, bladder, skin, and pain management. Nursing will also provide education and training to patient and family. · Prophylaxis:  DVT: Lovenox, KENA stocking, intermittent pneumatic compression device. GI: Colace, FiberCon, Dulcolax suppository as needed, milk of magnesium as needed, GlycoLax as needed, Senokot as needed.   · Pain: Tylenol as needed; Voltaren gel as needed; heat pad application as needed  · Continue aspirin, Crestor for coronary artery disease  · Continue Pepcid for gastric protection  · Continue Lasix for hypertension  · Continue Crestor for hyperlipidemia  · Continue glipizide, Lantus insulin and Humalog insulin coverage for diabetes mellitus  · Continue albuterol inhaler as needed for COVID-19 pneumonia  · Melatonin as needed for insomnia  · Nutrition:   Continue current diet  · Bladder: Monitoring signs or symptoms of UTI  · Bowel: Monitoring signs or symptoms of constipation  ·  and case management consultations for coordination of care and discharge planning      Missed Therapy Time:  · None      Ree Ojeda MD

## 2022-01-17 NOTE — PROGRESS NOTES
[facility] NOTE    Conference Date: 2022  Admit Date:  2022  2:12 PM  Patient Name: Jacinto Jovel    MRN: [id number]    : 1950  (73 y.o.)  Rehabilitation Admitting Diagnosis:  Personal history of COVID-19 [Z86.16]  Referring Practitioner: Remigio Aparicio MD      CASE MANAGEMENT  Current issues/needs regarding patient and family discharge status: Prior to admission patient was independent not using any devices and living with spouse. Patient works at [employer] and support system is made up of spouse and daughters Kimberly and Vi Laird. Patient sees someone from support system daily. Family doctor is Dr. Tamy Barrera and patient takes a baby Asprin daily and is diabetic. Family doctor monitors both the baby Asprin and the diabetes. Pharmacy of preference is [facility] in [place]. Patients biggest concern is walking and after discharge if patient has driving restrictions patients spouse will manage the driving. SW to follow and maintain involvement in discharge planning. PHYSICAL THERAPY  Patient overall is making progress with skilled PT treatment and has met 1/5 STG's and 0 LTG's however has had a short length of stay. Patient requires moderate assistance for supine<>sit, min assist for transfers with verbal cueing for hand placement and anterior weight shifting, CGA for ambulation with  RW ~8 feet with decreased step length, step height and unsteady gait pattern. Patient demonstrates decreased endurance and strength which limits her tolerance to functional mobility. Patient can continue to benefit from skilled PT  Treatment in order to assist with dynamic balance, gait training, transfer training, bed mobility,  Endurance training and safety for increased functional mobility. Other: will monitor for needs pending progress and DC destination    SPEECH THERAPY  Patient has met 5/6 STGs and 0/2 LTGs this reporting period.  Patient has demonstrated improvements within the domains of immediate recall, delayed recall, working memory, verbal/visual reasoning, and problem solving. Patient is consuming a soft and bite-size diet and thin liquids with no overt difficulty or s/s of aspiration. Plans to complete advanced regular texture meal tray on 1/19 to determine readiness for potential diet advancement. Patient with excellent identification and carryover of recommended swallow strategies. No barriers observed during this reporting period. Patient highly motivated to complete skilled ST services. At this time, recommend patient receive continued skilled ST services via IPR setting and consideration for continued skilled ST services upon discharge via OP with intermittent, daily supervision. A driving evaluation is recommended prior to returning to active driving. All aforementioned recommendations subject to change pending cognitive gains. OCCUPATIONAL THERAPY  Jeff Thompson is making steady progress on IP Rehab. She requires moderate assist for transfers. He requires moderate assist for for LB dressing. She tolerated sessions well and is able to maintain oxygen throughout session. Jeff Thompson cont to require skilled OT to improve safety and indep to decrease fall risk / risk of injury. Equipment Needed: Yes  Mobility Devices: Joss Pham: Rolling    RECREATIONAL THERAPY  Patient has been offered participation in recreational therapy activities and participates as able. 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 Current diet: ADULT DIET; Dysphagia - Soft and Bite Sized; 4 carb choices (60 gm/meal)  Please see nutrition note for details. NURSING  Continent of Bowel: Yes. Frequency: 2-3 times weekly. Management: prn bowel meds. Continent of Bladder: Yes. Frequency: several times daily. Management: toileting when rounding. Pain is Managed:  Yes. Management: Tylenol and Voltaren gel . Frequency of Intervention: every 4-6 hours. Adequately Controlled: Yes  Sleep: Inadequate  Signs and Symptoms of Infection:  No.   Signs and Symptoms of Skin Breakdown:  Yes. Site: coccyx, groin. Wound Care: moisture barrier and EPC. Injury and/or Falls during Inpatient Rehabilitation Admission: No  Anticoagulants: lovenox  Diabetic: yes, glipizide: glipizide and januvia  Consultations/Labs/X-rays: none pending  Oxygen while on IP Rehab:  No Currently using  0 liters per hour  .   Home oxygen: No    Recent Labs     01/18/22  0754 01/18/22  0922 01/19/22  0831   POCGLU 171* 177* 184*       No results found for: LDLCALC, LDLCHOLESTEROL, LDLDIRECT      Vitals:    01/18/22 0530 01/18/22 0857 01/18/22 2030 01/19/22 0824   BP:  116/65 127/69 108/74   Pulse:  78 80 79   Resp:  20 18 20   Temp:  98.1 °F (36.7 °C) 98.2 °F (36.8 °C) 98.4 °F (36.9 °C)   TempSrc:  Oral Oral Oral   SpO2:  97% 95% 95%   Weight: 197 lb (89.4 kg)      Height:              Family Education: Family available and participating in education   Fall Risk:  Falling star program initiated  Is the patient appropriate for a stay in the functional apartment? no    Discharge Plan   Estimated Discharge Date: 1/28/2022   Destination: discharge home with supervision  Services at Discharge: [address], Occupational Therapy, Speech Therapy, Nursing and HHA 3x week  Is patient appropriate for an outpatient driving evaluation? Yes when it is appropriate  Equipment at Discharge: Other: will monitor for needs pending progress and DC destination  Factors facilitating achievement of predicted outcomes: Family support, Motivated, Cooperative and Pleasant  Barriers to the achievement of predicted outcomes: Pain, Decreased endurance, Upper extremity weakness and Lower extremity weakness  Follow up with physiatrist? no  If yes, what timeframe? N/A    Team Members Present at Conference:  :Jackie Araujo [place]  Occupational Therapist:Christofer Fierro Sensing OTR/L [id number]  Physical Therapist:Sasha Barnhart, [address]  [address] [place] [address] 87, [address]  Jelena Son, YVETTE  Psychologist: Flip Calderon, PhD.    I approve the established interdisciplinary plan of care as documented within the medical record of Berkley Reveles.     Joo Cruz MD

## 2022-01-17 NOTE — PLAN OF CARE
Problem: Falls - Risk of:  Goal: Will remain free from falls  Description: Will remain free from falls  1/17/2022 1219 by Uma Juarez RN  Outcome: Ongoing  Note: Patient remains alert and oriented. Uses call light to request assistance from staff appropriately. Patient aware that she is weakened and up with maximum assist and joes stedy during daytime and moderate assist of two as day progresses and patient becomes more fatigued. Problem: Skin Integrity:  Goal: Will show no infection signs and symptoms  Description: Will show no infection signs and symptoms  1/17/2022 1219 by Uma Juarez RN  Outcome: Ongoing  Note: Patient remains on Diflucan for MASD to buttocks. Anti-fungal powder applied to buttocks. Patient does require some staff assist with repositioning in bed. Able to alert staff of need to reposition. Problem: Bleeding:  Goal: Will show no signs and symptoms of excessive bleeding  Description: Will show no signs and symptoms of excessive bleeding  1/17/2022 1219 by Uma Juarez RN  Outcome: Ongoing  Note: Patient remains on Lovenox for DVT prophylaxis. Abdominal bruising present but no active s/s of bleeding. Problem: Serum Glucose Level - Abnormal:  Goal: Ability to maintain appropriate glucose levels has stabilized  Description: Ability to maintain appropriate glucose levels has stabilized  1/17/2022 1219 by Uma Juarez RN  Outcome: Ongoing  Note: Patient has her own [facility] blood glucose monitor in place and monitors own blood sugar. Upset with this nurse today as blood glucose was checked with hospital meter as per policy. States that she doesn't want to be poked and that a physician told her that she would be able to use her Lorilee Jumbo to give staff readings. This  nurse verified with supervisor that staff is to continue to check blood glucose with hospital glucometer for accuracy as for calibration and documentation. Patient made aware.

## 2022-01-18 LAB
GLUCOSE BLD-MCNC: 171 MG/DL (ref 70–108)
GLUCOSE BLD-MCNC: 177 MG/DL (ref 70–108)

## 2022-01-18 PROCEDURE — 97116 GAIT TRAINING THERAPY: CPT

## 2022-01-18 PROCEDURE — 6360000002 HC RX W HCPCS: Performed by: PHYSICAL MEDICINE & REHABILITATION

## 2022-01-18 PROCEDURE — 6370000000 HC RX 637 (ALT 250 FOR IP): Performed by: PHYSICAL MEDICINE & REHABILITATION

## 2022-01-18 PROCEDURE — 97530 THERAPEUTIC ACTIVITIES: CPT

## 2022-01-18 PROCEDURE — 1180000000 HC REHAB R&B

## 2022-01-18 PROCEDURE — 97129 THER IVNTJ 1ST 15 MIN: CPT

## 2022-01-18 PROCEDURE — 82948 REAGENT STRIP/BLOOD GLUCOSE: CPT

## 2022-01-18 PROCEDURE — 97110 THERAPEUTIC EXERCISES: CPT

## 2022-01-18 PROCEDURE — 97535 SELF CARE MNGMENT TRAINING: CPT

## 2022-01-18 PROCEDURE — 99232 SBSQ HOSP IP/OBS MODERATE 35: CPT | Performed by: PHYSICAL MEDICINE & REHABILITATION

## 2022-01-18 PROCEDURE — 92526 ORAL FUNCTION THERAPY: CPT

## 2022-01-18 RX ORDER — LANOLIN ALCOHOL/MO/W.PET/CERES
6 CREAM (GRAM) TOPICAL NIGHTLY
Status: DISCONTINUED | OUTPATIENT
Start: 2022-01-18 | End: 2022-01-28 | Stop reason: HOSPADM

## 2022-01-18 RX ADMIN — MICONAZOLE NITRATE: 2 POWDER TOPICAL at 20:37

## 2022-01-18 RX ADMIN — ROSUVASTATIN 10 MG: 10 TABLET, FILM COATED ORAL at 20:36

## 2022-01-18 RX ADMIN — ENOXAPARIN SODIUM 30 MG: 100 INJECTION SUBCUTANEOUS at 20:35

## 2022-01-18 RX ADMIN — ENOXAPARIN SODIUM 30 MG: 100 INJECTION SUBCUTANEOUS at 09:31

## 2022-01-18 RX ADMIN — GLIPIZIDE 5 MG: 5 TABLET ORAL at 08:44

## 2022-01-18 RX ADMIN — CALCIUM POLYCARBOPHIL 625 MG: 625 TABLET, FILM COATED ORAL at 09:31

## 2022-01-18 RX ADMIN — Medication 6 MG: at 20:36

## 2022-01-18 RX ADMIN — FAMOTIDINE 20 MG: 20 TABLET, FILM COATED ORAL at 20:36

## 2022-01-18 RX ADMIN — DOCUSATE SODIUM 100 MG: 100 CAPSULE, LIQUID FILLED ORAL at 20:36

## 2022-01-18 RX ADMIN — ASPIRIN 81 MG CHEWABLE TABLET 81 MG: 81 TABLET CHEWABLE at 09:31

## 2022-01-18 RX ADMIN — FUROSEMIDE 40 MG: 40 TABLET ORAL at 09:31

## 2022-01-18 RX ADMIN — DOCUSATE SODIUM 100 MG: 100 CAPSULE, LIQUID FILLED ORAL at 09:31

## 2022-01-18 RX ADMIN — FLUCONAZOLE 200 MG: 200 TABLET ORAL at 09:31

## 2022-01-18 RX ADMIN — GLIPIZIDE 5 MG: 5 TABLET ORAL at 16:56

## 2022-01-18 RX ADMIN — FAMOTIDINE 20 MG: 20 TABLET, FILM COATED ORAL at 09:31

## 2022-01-18 RX ADMIN — MICONAZOLE NITRATE: 2 POWDER TOPICAL at 10:00

## 2022-01-18 ASSESSMENT — ENCOUNTER SYMPTOMS
TROUBLE SWALLOWING: 0
ABDOMINAL PAIN: 0
SORE THROAT: 0
NAUSEA: 0
DIARRHEA: 0
BACK PAIN: 0
RHINORRHEA: 0
WHEEZING: 0
SHORTNESS OF BREATH: 0
VOMITING: 0
CONSTIPATION: 0
COUGH: 0

## 2022-01-18 ASSESSMENT — PAIN SCALES - GENERAL
PAINLEVEL_OUTOF10: 0
PAINLEVEL_OUTOF10: 0

## 2022-01-18 NOTE — PROGRESS NOTES
Physical Medicine & Rehabilitation Progress Note    Chief Complaint:  Fatigue and tired    Subjective:    Hodan Mendoza is a 73 y.o. right-handed obese  female with history of hypertension, diabetes mellitus type 2, coronary artery disease requiring coronary artery stenting, status post bilateral eyes cataract surgeries, status post 3  sections, status post hysterectomy, status post hiatal hernia repair, status post sinus surgery, was admitted on 2022 for intensive inpatient management of impairment & disability secondary to critical illness myopathy and disability/physical decondition as result of COVID-19 pneumonia. The patient says he does not quite remember how she ended up in the hospital.     The patient apparently began experiencing reduced upper size, change in taste and smell, shortness of breath, nausea, dry heaves, and diarrhea starting in 2021. The symptom progressively worsened. She was advised by her PCP to visit ER. Therefore she went to [facility] ER on 2021. She was found to be hypoxic with O2 saturation in 70s% in room air. COVID test was performed which turned out to be positive. She was advised to be admitted but she left against medical advice because she preferred to be admitted to [address] [place] [address] instead. On 2021 she came to [address] [place] [address] ER with complaint of shortness of breath. She was found to have oxygen saturation in the 80s% in room air. She was admitted with diagnosis of acute hypoxic respiratory failure secondary to COVID-19 pneumonia. She was treated with Decadron and Olumiant (baricitnib). However her condition deteriorated despite of treatment. She was intubated on 2021 and was admitted to ICU. Her hospital course also was complicated by superimposed Klebsiella oxytocin bacterial pneumonia, bradycardia requiring dopamine infusion, and decubitus ulcer. Her condition later stabilized and improved. She was extubated on 1/2/2022. The patient says she feels well today. She says she was able to walk better starting yesterday. She still have weakness especially at her hips. She also still having easy fatigue with a poor endurance. She says her right buttock still has slight sore sensation. She did not try heat pad or Voltaren gel. She says her sleep was much better last night. Her  noticed that the patient's bilateral legs is more swollen today and voiced concern of blood clot. The patient currently is on Lovenox 30 mg twice daily injection for DVT prophylaxis. The patient denies having pain at her legs. She is tolerating the intensive inpatient rehabilitation treatment well. She says her function is improving. The patient's case is discussed in multidisciplinary meeting today. She is projected to be ready for discharge on 1/28/2022. Rehabilitation:  PT: Reviewed. Bed Mobility:  Supine to Sit: Moderate Assistance     Transfers:  Sit to Stand: Minimal Assistance  Stand to Sit:Minimal Assistance  -Patient required verbal cueing for hand placement and anterior weight shifting for increased ease of coming into a standing position  To/From Bed and Chair: Minimal Assistance  -Patient required verbal cueing  For hand placement     Ambulation:  Contact Guard Assistance  Distance: 13 feet, 21 feet and 23 feet  Surface: Level Tile  Device:Rolling Walker  Gait Deviations:  Decreased Step Length Bilaterally, Decreased Gait Speed, Narrow Base of Support and Unsteady Gait     Balance:  Dynamic Standing Balance: Contact Guard Assistance   -Patient stood in bathroom with 1UE support on grab bar to assist with donning and doffing pants. Patient required verbal cueing for upright posture and maintaining normalized DOMENICO for increased balance.  -Patient instructed in standing pod tapping with BUE support and initially 1 pod and progressed to 2 pods in a row.  Patient required verbal cueing for maintaining upright posture and for slowed speed due to unsteadiness with pod tapping. Patient able to tolerate standing for ~1 minute and 1 minute 5 seconds intermittently prior to requiring a rest break. Patient instructed in pod tapping in order to assist with dynamic balance and coordination.     Stairs:  Minimal Assistance  Number of Steps: 1 step x 2 reps  Height: 4\" step with Bilateral Handrails   -Patient demonstrated increased reliance on BUE support for stepping onto 4 inch step. Patient initially trialed 6 inch step however unable to perform. OT: Reviewed. ADL:   Lower Extremity Dressing: Moderate Assistance. Donned underwearing, utilizing dressing stick with increased time and multiple verbal and tactile cues,   donned pants with reacher, requiring extended time and multiple cues,   donned sock with sock aide following 1 demonstration while requiring multiple step by step cues,   donned B shoes with maximal assist as patient unable to follow verbal, visual and tactile cues at this time . Following donning of LB clothing, pt stood at RW level from EOB x ~2 minutes requiring increased assist to pull underwear and pants over bottom for safety with 1 UE release  Grooming: Contact Guard Assistance. standing sink side brushing teeth   Toileting: Moderate Assistance. requiring assist for flaquito care and clothing management   Toilet Transfer: [address] for safety .     BALANCE:  Sitting Balance:  Stand By Assistance. sitting EOB x ~45 minutes  Standing Balance: Contact Guard Assistance. RW level     BED MOBILITY:  Not Tested     TRANSFERS:  Sit to Stand:  Minimal Assistance, Moderate Assistance. varying due to surface  Stand to Sit: Contact Guard Assistance. for safety to prevent plopping  Stand Pivot: Moderate Assist LOB with assist to recover     FUNCTIONAL MOBILITY:  Assistive Device: Rolling Walker  Assist Level:  Contact Guard Assistance.    Distance: ~6 feet   Slow pace ST: Reviewed. Patient independently recalled 3/3 recommended swallow strategies and demonstrated excellent carryover of swallow strategies independently during meal this date. Patient demonstrated evidence of adequate bolus control/containment, timely/coordinated mastication with complete textural breakdown, adequate bolus formation with complete bolus clearance, suspected timely swallow initiation, and no overt s/s of aspiration across all trials. Recommend patient complete advanced meal tray trial of regular texture to determine readiness for diet advancement.      Patient completed advanced PO trials of saltine crackers (regular) during lunch this date. Patient demonstrated evidence of adequate bolus control/containment, timely/coordinated mastication with effective textural breakdown, adequate bolus formation with complete bolus clearance, suspected timely swallow initiation, and no overt s/s of aspiration. At this time, recommend patient complete advanced meal tray trials of regular texture to determine readiness for diet advancement.      ST completed medication management tasks of current MAR medications verbally. Patient was prompted to identify total number of pills each day, when medication would be taken, how frequent medications would take, and how many tablets/pills would be taken within each dosage     Medication Management - Pill Box Organization   Prescription 1 - ASPRIN - 1 tablet daily  Comprehension: independent     Prescription 2 - Colace - 1 tablet twice daily   Comprehension: min cues      Prescription 3 - Pepcid - 1 tablet twice daily  Comprehension: independent     Prescription 4 - glipizide 1 tablet twice daily with meals - 30 minutes before   Comprehension: independent - excellent insight      *good insight to current medication regimen as well as medication regimen prior to hospitalization.     *tangential throughout and required frequent re-direction       Review of Systems:  Review of Systems   Constitutional: Positive for fatigue. Negative for chills, diaphoresis and fever. HENT: Negative for hearing loss, rhinorrhea, sneezing, sore throat and trouble swallowing. Eyes: Negative for visual disturbance. Respiratory: Negative for cough, shortness of breath and wheezing. Cardiovascular: Positive for leg swelling. Negative for chest pain and palpitations. Gastrointestinal: Negative for abdominal pain, constipation, diarrhea, nausea and vomiting. Genitourinary: Negative for dysuria. Musculoskeletal: Positive for arthralgias (right hip) and gait problem. Negative for back pain, myalgias and neck pain. Skin: Negative for rash. Neurological: Positive for weakness (Generalized). Negative for dizziness, tremors, light-headedness, numbness and headaches. Psychiatric/Behavioral: Negative for dysphoric mood, hallucinations and sleep disturbance. The patient is not nervous/anxious.          Objective:  /74   Pulse 79   Temp 98.4 °F (36.9 °C) (Oral)   Resp 20   Ht 5' 2\" (1.575 m)   Wt 197 lb (89.4 kg) Comment: 197 lb  LMP  (LMP Unknown)   SpO2 95%   Breastfeeding No   BMI 36.03 kg/m²   Physical Exam   General:  well-developed, well nourished obese  female ; in no acute distress ; appropriate affect & mood; sitting on reclining chair comfortably  Eyes: pupil equally round ; extra-ocular motion intact bilaterally  Head, Ear, Nose, Mouth & Throat : normocephalic ; no discharge from ears or nose ; no deformity ; no facial swelling ; oral mucosa pink   Neck :  supple ; no tenderness ; no muscle spasm  Cardiovascular : regular rate & rhythm ; normal S1 & S2 heart sound ; no murmur ; normal peripheral pulse at bilateral upper & lower extremities  Pulmonary : Breath sounds present at bilateral lung fields; no wheezing ; no rale; no crackle  Gastrointestinal : soft, protruded abdomen without tenderness ; normal bowel sound present   Back : no tenderness; no muscle spasm  Skin: no skin lesion or rash ; presence of mild nonpitting swelling at bilateral legs but no pitting edema at all 4 extremities  Musculoskeletal : no limb asymmetry; no limb deformity; tenderness at right upper buttock over the gluteus medius muscle; otherwise no tenderness at bilateral upper extremities & the rest of bilateral lower extremities; no palpable mass at limbs ; no joints laxity or crepitation ; hip flexion passive ROM reaching 90 degrees bilaterally; ankle dorsiflexion passive ROM reaching 5 degrees bilaterally; otherwise normal functional joints ROM at the rest of bilateral upper & lower extremities  Cerebral :  alert ; awake ; oriented to place, person and time; follow two-step verbal command  Cerebellum : no dysmetria with bilateral finger-to-nose test ; unable to perform  heel-to-shin test on both sides  Cranial Nerves :  grossly intact CN II to XII function  Sensory : intact light touch and pin prick sensation at bilateral upper & lower extremities  Motor : normal tone at bilateral upper & lower extremities ; 4+5 to 5/5 muscle strength at the bilateral fingers flexion, extension and abduction; 3+/5 to 4-/5 muscle strength at right hip flexion; 3+/5 to 4-/5 muscle strength at left hip flexion; 4+/5 muscle strength at the bilateral knee flexion; otherwise 5/5 muscle strength at the rest of bilateral upper and the lower extremities  Reflex : 0 bilateral biceps, bilateral brachioradialis and bilateral knees reflexes   Pathological Reflex :  No Roberta's sign ; no ankle clonus  Gait : Not assessed      Diagnostics:   Recent Results (from the past 24 hour(s))   POCT glucose    Collection Time: 01/19/22  8:31 AM   Result Value Ref Range    POC Glucose 184 (H) 70 - 108 mg/dl       Results for Atul Faustin (MRN [id number]) as of 1/19/2022 11:11   Ref.  Range 1/16/2022 13:40 1/17/2022 08:09 1/18/2022 07:54 1/18/2022 09:22 1/19/2022 08:31   POC Glucose Latest Ref Range: 70 - 108 mg/dl 128 (H) 144 (H) 171 (H) 177 (H) 184 (H)         Impression:  · Critical illness myopathy   · Debility/physical decondition secondary to ILAML-72 pneumonia, complicated by bacterial pneumonia, bradycardia, and decubitus ulcer  · History of COVID-19 pneumonia with acute hypoxic respiratory failure requiring intubation, complicated by Klebsiella oxytocin bacterial pneumonia  · Mild dysphagia/moderate pharyngeal dysphagia  · Mild cognitive impairment  · Hypertension  · Diabetes mellitus type 2  · History of coronary artery disease requiring coronary artery stenting  · Severe cardiomyopathy with reduced ejection fraction  · Obesity  · Right buttock pain likely due to gluteus medius muscle strain  · Bilateral leg swelling, to rule out deep vein thrombosis    The patient's condition remains stable. The patient has some swelling at bilateral legs without pain today. I will order bilateral lower extremities Doppler study to rule out DVT. She still has generalized weakness especially at her bilateral hips. However the hip muscle strength is improving and her ability to stand up and ambulate is also increasing. She still experiences some degree of pain at her right buttock. I encouraged her to try Voltaren gel heel pad. Her blood glucose level is well controlled. She continues tolerating the intensive inpatient rehabilitation treatment well. Her function is improving slowly and steadily. The patient currently is projected to be ready for discharge on 1/28/2022. Plan:  · Continue intensive PT/OT/SLP/RT inpatient rehabilitation program at least 3 hours per day, 5 days per week in order to improve functional status prior to discharge. Family education and training will be completed. Equipment evaluations and recommendations will be completed as appropriate. · Rehabilitation nursing continue to be involved for bowel, bladder, skin, and pain management.   Nursing will also provide education and training to patient and family. · Prophylaxis:  DVT: Lovenox, KENA stocking, intermittent pneumatic compression device. GI: Colace, FiberCon, Dulcolax suppository as needed, milk of magnesium as needed, GlycoLax as needed, Senokot as needed.   · Pain: Tylenol as needed; Voltaren gel as needed; heat pad application as needed  · Bilateral lower extremities venous Doppler study to rule out DVT  · Continue aspirin, Crestor for coronary artery disease  · Continue Pepcid for gastric protection  · Continue Lasix for hypertension  · Continue Crestor for hyperlipidemia  · Continue glipizide, Lantus insulin and Humalog insulin coverage for diabetes mellitus  · Continue albuterol inhaler as needed for COVID-19 pneumonia  · Melatonin as needed for insomnia  · Nutrition:   Continue current diet  · Bladder: Monitoring signs or symptoms of UTI  · Bowel: Monitoring signs or symptoms of constipation  ·  and case management for coordination of care and discharge planning      Missed Therapy Time:  · None      Selina Wetzel MD

## 2022-01-18 NOTE — PROGRESS NOTES
[facility]  Occupational Therapy  PROGRESS   Time:  Time In: 0730  Time Out: 0830  Timed Code Treatment Minutes: 60 Minutes  Minutes: 60          Date: 2022  Patient Name: Hiral Powell,   Gender: female      Room: [facility]68/068-A  MRN: [id number]  : 1950  (73 y.o.)  Referring Practitioner: Dr. Meera Ram  Diagnosis: personal history of COVID 19  Additional Pertinent Hx: Elvin Angel is a 79-year-old white female who presented to [facility] on 2021 with complaints of shortness of breath she has a past medical history lifetime non-smoker, CAD with stent placement in , hypertension, diabetes mellitus, dyslipidemia. Per report she presented to [facility] on  with complaints of hypoxia and shortness of breath. She was initially placed on 4 L nasal cannula.  patient had persistent hypoxia and was transitioned to high flow nasal cannula. On  patient was transitioned to BiPAP and transferred to [facility] ICU for intubation. Pt extubated . Restrictions/Precautions:  Restrictions/Precautions: General Precautions,Fall Risk  Position Activity Restriction  Other position/activity restrictions: out of COVID isolation 22, monitor BP    SUBJECTIVE: Upon entering room pt seated EOB, agreeable to OT session. Pt pleasant and cooperative, however, appears easily agitated with increased anxiety at this time. Nursing made aware following session. PAIN: none reported    Vitals: Oxygen: room air 92-94%    COGNITION: Decreased Insight    ADL:   Lower Extremity Dressing: Moderate Assistance.     Donned underwearing, utilizing dressing stick with increased time and multiple verbal and tactile cues,   donned pants with reacher, requiring extended time and multiple cues,   donned sock with sock aide following 1 demonstration while requiring multiple step by step cues,   donned B shoes with maximal assist as patient unable to follow verbal, visual and tactile cues at this time . Following donning of LB clothing, pt stood at RW level from EOB x ~2 minutes requiring increased assist to pull underwear and pants over bottom for safety with 1 UE release    BALANCE:  Sitting Balance:  Stand By Assistance. sitting EOB x ~45 minutes  Standing Balance: Minimal Assistance. for safety at RW level    BED MOBILITY:  Not Tested    TRANSFERS:  Sit to Stand:  Minimal Assistance. from EOB to RW level  Stand to Sit: Moderate Assistance. sitting into recliner chair due to LOB with assist to recover, pt attempting to plop into chair during LOB   Stand Pivot: Moderate Assist LOB with assist to recover     ASSESSMENT:  Assessment: Bruce Mehta is making steady progress on IP Rehab. She requires moderate assist for transfers. He requires moderate assist for for LB dressing. She tolerated sessions well and is able to maintain oxygen throughout session. Bruce Mehta cont to require skilled OT to improve safety and indep to decrease fall risk / risk of injury. Activity Tolerance:  Patient tolerance of  treatment: fair. Pt reports having increased anxiety at this time, and appears to have increased agitation. Discharge Recommendations: Continue to assess pending progress,Home with Home health OT  Equipment Recommendations: Equipment Needed: Yes  Mobility Devices: Walker  Plan: Times per week: 5x week/90 min, 1x week/30 min  Current Treatment Recommendations: Strengthening,Balance Training,Functional Mobility Training,Endurance Training,Home Management Training,Patient/Caregiver Education & Training,Self-Care / ADL,Safety Education & Training    Patient Education  Patient Education: Role of OT, Plan of Care, ADL's and Energy Conservation   Pt educated regarding balance with use of RW, to decrease risk of fall or injury with LOB.  Pt verbalized understanding     Goals  Short term goals  Time Frame for Short term goals: 1 week  Short term goal 1: Pt to increase standing endurance to tolerate > 1 min with bilateral UE support and CGA in prep for hand release to complete clothign management GOAL MET REVISED  Short term goal 2: Pt to complete LB ADL tasks with use LHAE as needed with mod A GOAL MET REVISED  Short term goal 3: Pt will complete BUE AROM greater than 90 degrees with no > than Min A and min VC to increase shoulder flexion to reach outside base of support for needed object to complete ADL tasks GOAL NOT MET   Short term goal 4: Pt to complete stand pivot transfers with min A consistently and no no cues for safety to increase indep with completing toilet transfers GOAL NOT MET   Short term goal 5: Pt to participate in further assessment of mobility with OTR as her strength and endurance increases to increase indep with ADL tasks GOAL NOT MET  Long term goals  Time Frame for Long term goals : 3 week  Long term goal 1: Pt to complete BADL routine with SBA and no cues for safety GOAL NOT MET   Long term goal 2: Pt to complete sit to stand transfers from various surfaces including toilet with SBA and no ceus for safety GOAL NOT MET   Long term goal 3: Pt to demo standing balance with unilateral UE handn release with SBA to complete clothign management after dressign and toielting GOAL NOT MET      Revised Short-Term Goals  Short term goals  Time Frame for Short term goals: 1 week  Short term goal 1: Pt to increase standing endurance to tolerate > 1 min with bilateral UE support and CGA in prep for hand release to complete clothign management  Short term goal 2: Pt to complete LB ADL tasks with use LHAE as needed with mod A  Short term goal 3: Pt will complete BUE AROM greater than 90 degrees with no > than Min A and min VC to increase shoulder flexion to reach outside base of support for needed object to complete ADL tasks  Short term goal 4: Pt to complete stand pivot transfers with min A consistently and no no cues for safety to increase indep with completing toilet transfers  Short term goal 5: Pt to participate in further assessment of mobility with OTR as her strength and endurance increases to increase indep with ADL tasks  Long term goals  Time Frame for Long term goals : 3 week  Long term goal 1: Pt to complete BADL routine with SBA and no cues for safety  Long term goal 2: Pt to complete sit to stand transfers from various surfaces including toilet with SBA and no ceus for safety  Long term goal 3: Pt to demo standing balance with unilateral UE handn release with SBA to complete clothign management after dressign and toielting      Following session, patient left in safe position with all fall risk precautions in place.

## 2022-01-18 NOTE — PROGRESS NOTES
[facility] PHYSICAL THERAPY  DAILY NOTE  [address] [facility],4Th Floor - 7E-68/068-A    Time In: 1400  Time Out: 1430  Timed Code Treatment Minutes: 30 Minutes  Minutes: 30          Date: 2022  Patient Name: Lani Mandel,  Gender:  female        MRN: [id number]  : 1950  (73 y.o.)     Referring Practitioner: Bruce Goodman MD  Diagnosis: Personal history of COVID-19  Additional Pertinent Hx: Per Acute notes, \"Kaleigh Little is a 77-year-old white female who presented to [facility] on 2021 with complaints of shortness of breath she has a past medical history lifetime non-smoker, CAD with stent placement in , hypertension, diabetes mellitus, dyslipidemia. Per report she presented to [facility] on  with complaints of hypoxia and shortness of breath. She was initially placed on 4 L nasal cannula.  patient had persistent hypoxia and was transitioned to high flow nasal cannula. On  patient was transitioned to BiPAP and transferred to [facility] ICU for intubation. Pt extubated . \"     Prior Level of Function:  Lives With: Spouse  Type of Home: House  Home Layout: One level  Home Access: Stairs to enter without rails  Entrance Stairs - Number of Steps: 1 step (very small)  Home Equipment:  (none)   Bathroom Shower/Tub: Walk-in shower (+ jaquazi)  Bathroom Toilet: Handicap height  Bathroom Equipment: Built-in shower seat (pt stating she has a bench right outside shower as well to sit on dry off)  Bathroom Accessibility: Accessible    ADL Assistance: Independent  Homemaking Assistance: Independent  Ambulation Assistance: Independent  Transfer Assistance: Independent  Active : Yes  IADL Comments: 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  Pt stating her children all live close and would be willing to help. Spouse in good health as well to assist.    Restrictions/Precautions:  Restrictions/Precautions: General Precautions,Fall Risk  Position Activity Restriction  Other position/activity restrictions: out of COVID isolation 1/6/22, monitor BP     SUBJECTIVE: Patient seated in recliner upon arrival. Patient is pleasant and agreeable to therapy. Patient very talkative throughout session requiring frequent redirection. PAIN: 0/10    Vitals: Vitals not assessed per clinical judgement, see nursing flowsheet    OBJECTIVE:  Bed Mobility:  Not Tested    Transfers:  Sit to Stand: Contact Guard Assistance to  Minimal Assistance  Stand to [facility] 68 to Minimal Assistance  -Patient required verbal cueing for hand placement, anterior weight shifting and eccentric lowering control    Ambulation:  Not Tested    Exercise:  Patient was guided in 1 set(s) 10 reps of exercise to both lower extremities. Standing heel/toe raises, Standing marches, Standing hip flexion and Mini squats. Exercises were completed for increased independence with functional mobility.  -Patient required frequent rest breaks due to decreased endurance levels    Functional Outcome Measures: Not completed       ASSESSMENT:  Assessment: Patient progressing toward established goals. Activity Tolerance:  Patient tolerance of  treatment: fair. Limited due to decreased endurance     Equipment Recommendations: Other: will monitor for needs pending progress and DC destination  Discharge Recommendations: Continue to assess pending progress and Patient would benefit from continued PT at discharge  Plan: Times per week: 5x/wk 90 min; 1x/wk 30 min  Current Treatment Recommendations: Strengthening,Positioning,Patient/Caregiver Education & Training,Safety Education & Training,Balance Training,Endurance Training,Functional Mobility Training,Neuromuscular Re-education,Gait Training,Transfer Training,Home Exercise Program,Equipment Evaluation, Education, & procurement    Patient Education  Patient Education: Transfers, Verbal Exercise Instruction,  - Patient Verbalized Understanding, - Patient Requires Continued Education    Goals:  Patient goals : to get stronger  Short term goals  Time Frame for Short term goals: 1 week  Short term goal 1: Pt to be CGA for supine <> sit to get in/out of bed  Short term goal 2: Pt to be CGA for sit <> stand to get up to ambulate/transfer  Short term goal 3: Pt to be CGA for stand-pivot with RW to get between seats  Short term goal 4: Pt to ambulate > 10 ft with RW with Min A to get to bathroom  Short term goal 5: Pt to complete car transfer with Min A for transportation needs  Long term goals  Time Frame for Long term goals : 3 weeks  Long term goal 1: Pt to be Supervision for supine <> sit to get in/out of bed  Long term goal 2: Pt to be Supervision for sit <> stand to get up to ambulate/transfer  Long term goal 3: Pt to be Supervision for stand-pivot with RW to get between seats  Long term goal 4: Pt to ambulate > 50 ft with RW with SBA for household distances  Long term goal 5: Pt to complete car transfer with Supervision for transportation needs    Following session, patient left in safe position with all fall risk precautions in place.

## 2022-01-18 NOTE — PROGRESS NOTES
[address] [address]  INPATIENT PHYSICAL THERAPY  Progress Note  [facility]    Time In: 1130  Time Out: 1230  Timed Code Treatment Minutes: 60 Minutes  Minutes: 60          Date: 2022  Patient Name: Lani Mandel,  Gender:  female        MRN: [id number]  : 1950  (73 y.o.)     Referring Practitioner: Bruce Goodman MD  Diagnosis: Personal history of COVID-19  Additional Pertinent Hx: Per Acute notes, \"Kaleigh Little is a 72-year-old white female who presented to [facility] on 2021 with complaints of shortness of breath she has a past medical history lifetime non-smoker, CAD with stent placement in , hypertension, diabetes mellitus, dyslipidemia. Per report she presented to [facility] on  with complaints of hypoxia and shortness of breath. She was initially placed on 4 L nasal cannula.  patient had persistent hypoxia and was transitioned to high flow nasal cannula. On  patient was transitioned to BiPAP and transferred to [facility] ICU for intubation. Pt extubated . \"     Prior Level of Function:  Lives With: Spouse  Type of Home: House  Home Layout: One level  Home Access: Stairs to enter without rails  Entrance Stairs - Number of Steps: 1 step (very small)  Home Equipment:  (none)   Bathroom Shower/Tub: Walk-in shower (+ jaquazi)  Bathroom Toilet: Handicap height  Bathroom Equipment: Built-in shower seat (pt stating she has a bench right outside shower as well to sit on dry off)  Bathroom Accessibility: Accessible    ADL Assistance: Independent  Homemaking Assistance: Independent  Ambulation Assistance: Independent  Transfer Assistance: Independent  Active : Yes  IADL Comments: 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  Pt stating her children all live close and would be willing to help. Spouse in good health as well to assist.    Restrictions/Precautions:  Restrictions/Precautions: General Precautions,Fall Risk  Position Activity Restriction  Other position/activity restrictions: out of COVID isolation 1/6/22, monitor BP     SUBJECTIVE: Pt. Seated in restroom with nursing present upon arrival and pleasantly agrees to therapy session. Pt. Reports increased weakness in B LEs at beginning of session requiring minimal encouragement to complete functional mobility. PAIN: None indicated    Vitals: Oxygen: 88%-95%    OBJECTIVE:  Bed Mobility:  Not Tested    Transfers:  Sit to Stand: Minimal Assistance  Stand to Sit:Minimal Assistance  To/From Bed and Chair: Minimal Assistance  Car:Minimal Assistance    Ambulation:  Contact Guard Assistance  Distance: 8' x 1, 3' x 2  Surface: Level Tile  Device:Rolling Walker  Gait Deviations: Forward Flexed Posture, Slow Haylee, Decreased Step Length Bilaterally, Decreased Gait Speed, Decreased Heel Strike Bilaterally, Narrow Base of Support, Decreased Terminal Knee Extension and Decreased foot clearance bilaterally. Balance:  Pt. Completed standing dynamic balance activity: ring toss with Normal DOMENICO on level tile and Single UE support on Rolling Walker with Contact Guard Assistance. Activity completed to improve balance, standing tolerance, enhance functional mobility, and reduce risk of falls. Exercise:  Patient was guided in 1 set(s) 10 reps of exercises: Glut sets, Seated marches, Seated hamstring curls, Seated heel/toe raises, Long arc quads, Seated isometric hip adduction, Seated abduction/adduction, and abdominal isometrics. Exercises were completed for increased independence with functional mobility. Functional Outcome Measures: Not completed       ASSESSMENT:  Assessment: Patient progressing toward established goals.      PT Assessment: Patient overall is making progress with skilled PT treatment and has met 1/5 STG's and 0 LTG's however has had a short length of stay. Patient requires moderate assistance for supine<>sit, min assist for transfers with verbal cueing for hand placement and anterior weight shifting, CGA for ambulation with  RW ~8 feet with decreased step length, step height and unsteady gait pattern. Patient demonstrates decreased endurance and strength which limits her tolerance to functional mobility. Patient can continue to benefit from skilled PT  Treatment in order to assist with dynamic balance, gait training, transfer training, bed mobility,  Endurance training and safety for increased functional mobility. Activity Tolerance:  Patient tolerance of  treatment: good. Equipment Recommendations: Other: will monitor for needs pending progress and DC destination  Discharge Recommendations: Continue to assess pending progress     Plan: Times per week: 5x/wk 90 min; 1x/wk 30 min  Current Treatment Recommendations: Strengthening,Positioning,Patient/Caregiver Education & Training,Safety Education & Training,Balance Training,Endurance Training,Functional Mobility Training,Neuromuscular Re-education,Gait Training,Transfer Training,Home Exercise Program,Equipment Evaluation, Education, & procurement    Patient Education  Patient Education: Plan of Care, Transfers, Reviewed Prior Education, Gait, Car Transfers, Verbal Exercise Instruction    Goals:  Patient goals : to get stronger  Short term goals  Time Frame for Short term goals: 1 week  Short term goal 1: Pt to be CGA for supine <> sit to get in/out of bed  NOT MET  Short term goal 2: Pt to be CGA for sit <> stand to get up to ambulate/transfer  NOT MET  Short term goal 3: Pt to be CGA for stand-pivot with RW to get between seats  NOT MET  Short term goal 4: Pt to ambulate > 10 ft with RW with Min A to get to bathroom  NOT MET  Short term goal 5: Pt to complete car transfer with Min A for transportation needs  MET, SEE LTG  Long term goals  Time Frame for Long term

## 2022-01-18 NOTE — PROGRESS NOTES
[address] [facility] THERAPY  [facility]  PROGRESS NOTE    TIME   SLP Individual Minutes  Time In: 5395  Time Out: 1300  Minutes: 30  Timed Code Treatment Minutes: 10 Minutes     Dysphagia tx: 20 minutes   Cog tx: 10 minutes     Date: 2022  Patient Name: Margy Loya      CSN: [id number]   : 1950  (73 y.o.)  Gender: female   Referring Physician:  Tammie Lara MD  Diagnosis: Personal history of COVID-19  Secondary Diagnosis: Dysphagia, Cognitive Impairment  Precautions: Aspiration Risk, Fall Risk  Current Diet: Soft and Bite Size with Thin  Swallowing Strategies: Full Upright Position, Small Bite/Sip, Pulmonary Monitoring, Alternate Solids and Liquids, Limit Distractions and Monitor for Fatigue           Date of Last MBS/FEES: MBS 2022    Pain:  No pain reported. Subjective:  Upon arrival to room, patient awake in recliner and agreeable to skilled dietary analysis. Pleasant, cooperative, and actively engaged throughout. No family present. Short-Term Goals:  SHORT TERM GOAL #1:  Goal 1: Patient wll consume a soft and bite-size diet with thin liquids while implementing recommended swallow strategies with no overt s/s of aspiration and adequate endurance to assist with meeting nutrition/hydration needs. -GOAL MET; CONTINUE   INTERVENTIONS: Patient independently recalled 3/3 recommended swallow strategies and demonstrated excellent carryover of swallow strategies independently during meal this date. Patient demonstrated evidence of adequate bolus control/containment, timely/coordinated mastication with complete textural breakdown, adequate bolus formation with complete bolus clearance, suspected timely swallow initiation, and no overt s/s of aspiration across all trials. Recommend patient complete advanced meal tray trial of regular texture to determine readiness for diet advancement.        SHORT TERM GOAL #2:  Goal 2: Patient will consume advanced PO trials with ST while demonstrating timely/coordinated mastication, complete bolus clearance, no overt s/s of aspiration, and adeqaute endurance for potential diet advancement to least restrictive diet - GOAL MET; CONTINUE    INTERVENTIONS: Patient completed advanced PO trials of saltine crackers (regular) during lunch this date. Patient demonstrated evidence of adequate bolus control/containment, timely/coordinated mastication with effective textural breakdown, adequate bolus formation with complete bolus clearance, suspected timely swallow initiation, and no overt s/s of aspiration. At this time, recommend patient complete advanced meal tray trials of regular texture to determine readiness for diet advancement. SHORT TERM GOAL #3:  Goal 3: Patient will complete pharyngeal strengthening exercises x10-15 (effortful swallow, chris) to improve overall timeliness of swallow, airway protection, and decrease pharyngeal residue. - GOAL NOT MET; CONTINUE   INTERVENTIONS: DNT due to focus on additional STGs. SHORT TERM GOAL #4:  Goal 4: Patient will complete orientation and immediate/delayed/working memory tasks with 80% accuracy and moderate cuing in order to improve retention of novel information in current environment. -GOAL MET; REVISE   REVISED Goal 4: Patient will complete orientation and immediate/delayed/working memory tasks with 85% accuracy given min cuing in order to improve retention of novel information in current environment. INTERVENTIONS: Did not address this session d/t focus on other goals. PRIOR SESSION   Recall of ST Information (5 Units)  Immediate Recall: 4/5 independent, 1/5 with logical cuing  10 Minute Delayed Recall: 5/5 independent  20 Minute Delayed Recall: 5/5 independent  *Excellent given compensatory strategy of repetition.     SHORT TERM GOAL #5:  Goal 5: Patient will complete verbal/visual reasoning and safety problem solving tasks with 80% accuracy and moderate cuing in order to improve independent completion of IADLs. - GOAL MET; REVISE   REVISED Goal 5: Patient will complete verbal/visual reasoning and safety problem solving tasks with 85% accuracy given min cuing in order to improve independent completion of IADLs. INTERVENTIONS: Did not address this session d/t focus on other goals     PRIOR SESSION  Safety Problem Solving with Picture Cards  ID Problem: 20/20 independent  ID Solution: 17/20 independent, 3/20 with logical cuing  *Excellent completion of task    SHORT TERM GOAL #6:  Goal 6: Patient will complete executive functioning tasks (i.e., medications, finances, scheduling) with 80% accuracy and moderate cuing in order to improve independent completion of IADLs with support of family. - GOAL MET; REVISE   REVISED Goal 6: Patient will complete executive functioning tasks (i.e., medications, finances, scheduling) with 85% accuracy given min cuing in order to improve independent completion of IADLs with support of family. INTERVENTIONS: ST completed medication management tasks of current MAR medications verbally. Patient was prompted to identify total number of pills each day, when medication would be taken, how frequent medications would take, and how many tablets/pills would be taken within each dosage    Medication Management - Pill Box Organization   Prescription 1 - ASPRIN - 1 tablet daily  Comprehension: independent      Prescription 2 - Colace - 1 tablet twice daily   Comprehension: min cues     Prescription 3 - Pepcid - 1 tablet twice daily  Comprehension: independent    Prescription 4 - glipizide 1 tablet twice daily with meals - 30 minutes before   Comprehension: independent - excellent insight     *good insight to current medication regimen as well as medication regimen prior to hospitalization.     *tangential throughout and required frequent re-direction        Long-Term Goals:  Timeframe for Long-term Goals: 6 weeks    LONG TERM GOAL #1:  Goal 1: Patient will safely consume regular diet with thin liquids with implementation of compensatory swallowing strategies and withOUT overt s/s of aspiration in order to maintain adequate nutrition and hydration measures. -ONGOING    LONG TERM GOAL #2:  Goal 2: Patient will improve cognitive functioning to at least a modified independent level in order to improve safety of IADL completion upon potential discharge to home environment. - ONGOING     ST FIM ASSESSMENT:     Admission score Current score Goal Status   COMMUNICATION 5 - Patient understands basic needs (hungry/hot/pain)   6 - Complex ideas 90% or device (hearing aid or glasses- if patient is primarily a visual learner)   Progressing   EXPRESSION 5 - Expresses basic ideas/needs only (hungry/hot/pain)     6 - Device used to express complex ideas/needs   Progressing   SOCIAL INTERACTION 5 - Patient is appropriate with supervision/cues   6 - Patient requires medication for mood and/or effect   Progressing   PROBLEM SOLVING 3 - Patient solves simple/routine tasks 50%-74%   5 - Patient able to solve simple/routine tasks   Progressing   MEMORY 3 - Patient remembers 50%-74% of the time   4 - Patient remembers 75-90%+ of the time   Progressing       Comprehension: 6 - Complex ideas 90% or device (hearing aid or glasses- if patient is primarily a visual learner)  Expression: 6 - Device used to express complex ideas/needs  Social Interaction: 6 - Patient requires medication for mood and/or effect  Problem Solvin - Patient able to solve simple/routine tasks  Memory: 4 - Patient remembers 75-90%+ of the time    EDUCATION:  Learner: Patient and Significant Other  Education:  Reviewed diet and strategies, Reviewed ST goals and Plan of Care and Reviewed recommendations for follow-up  Evaluation of Education: Verbalizes understanding    ASSESSMENT/PLAN:  Summary: Patient has met 5/6 STGs and 0/2 LTGs this reporting period.  Patient has demonstrated improvements within the domains of immediate recall, delayed recall, working memory, verbal/visual reasoning, and problem solving. Patient is consuming a soft and bite-size diet and thin liquids with no overt difficulty or s/s of aspiration. Plans to complete advanced regular texture meal tray on 1/19 to determine readiness for potential diet advancement. Patient with excellent identification and carryover of recommended swallow strategies. No barriers observed during this reporting period. Patient highly motivated to complete skilled ST services. At this time, recommend patient receive continued skilled ST services via IPR setting and consideration for continued skilled ST services upon discharge via OP with intermittent, daily supervision. A driving evaluation is recommended prior to returning to active driving. All aforementioned recommendations subject to change pending cognitive gains. Activity Tolerance:  Patient tolerance of  treatment: good. Assessment/Plan: Patient progressing toward established goals. Continues to require skilled care of licensed speech pathologist to progress toward achievement of established goals and plan of care. .     Plan for Next Session: Dietary Analysis of Advanced Meal Ervin Curry (Maril[phone]) Dejan Sabillon M.A., [address]

## 2022-01-18 NOTE — PROGRESS NOTES
[facility] [facility] [place] [facility]  [facility]  Occupational Therapy  Daily Note  Time:   Time In: 1330  Time Out: 1400  Timed Code Treatment Minutes: 30 Minutes  Minutes: 30          Date: 2022  Patient Name: Jourdan Trevino,   Gender: female      Room: [facility]/068-A  MRN: [id number]  : 1950  (73 y.o.)  Referring Practitioner: Dr. Trinh Fraser  Diagnosis: personal history of COVID 19  Additional Pertinent Hx: Dimitri Carbajal is a 77-year-old white female who presented to [facility] on 2021 with complaints of shortness of breath she has a past medical history lifetime non-smoker, CAD with stent placement in , hypertension, diabetes mellitus, dyslipidemia. Per report she presented to [facility] on  with complaints of hypoxia and shortness of breath. She was initially placed on 4 L nasal cannula.  patient had persistent hypoxia and was transitioned to high flow nasal cannula. On  patient was transitioned to BiPAP and transferred to [place] ICU for intubation. Pt extubated . Restrictions/Precautions:  Restrictions/Precautions: General Precautions,Fall Risk  Position Activity Restriction  Other position/activity restrictions: out of COVID isolation 22, monitor BP     SUBJECTIVE: Upon entering room pt seated in recliner chair, agreeable to OT session. Pt pleasant and cooperative. PAIN: none reported     Vitals: Vitals not assessed per clinical judgement, see nursing flowsheet    COGNITION: Slow Processing and Decreased Insight    ADL:   Grooming: Contact Guard Assistance. standing sink side brushing teeth   Toileting: Moderate Assistance. requiring assist for flaquito care and clothing management   Toilet Transfer: [employer]. for safety . BALANCE:  Standing Balance: Contact Guard Assistance. RW level    BED MOBILITY:  Not Tested    TRANSFERS:  Sit to Stand:  Minimal Assistance, Moderate Assistance.  varying due goal 3: Pt to demo standing balance with unilateral UE handn release with SBA to complete clothign management after dressign and toielting    Following session, patient left in safe position with all fall risk precautions in place.

## 2022-01-18 NOTE — PROGRESS NOTES
[address] [address]  Recreational Therapy  Daily Note  [address]    Time Spent with Patient: 15 minutes    Date:  1/18/2022       Patient Name: Adelso Lozano      MRN: [id number]      YOB: 1950 (73 y.o.)       Gender: female  Diagnosis: personal history of COVID 23  Referring Practitioner: Dr. 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

## 2022-01-18 NOTE — PROGRESS NOTES
[address] [place] [address]  Diagnosis List for Inpatient Rehab facility (IRF) - Patient Assessment Instrument (JOYCE)    Patient Name: Lani Mandel        MRN: [id number]    : 1950  (77 y.o.)  Gender: female     Primary impairment requiring rehabilitation: 3.8 Neuromuscular disorders    Etiologic Diagnosis that led to the condition: Critical illness myopathy     Comorbid conditions affecting rehabilitation:  Critical illness myopathy   Debility/physical decondition secondary to [id number] pneumonia, complicated by bacterial pneumonia, bradycardia, and decubitus ulcer  History of COVID-19 pneumonia with acute hypoxic respiratory failure requiring intubation, complicated by Klebsiella oxytocin bacterial pneumonia  *Mild dysphagia/moderate pharyngeal dysphagia  Mild cognitive impairment  Hypertension  Diabetes mellitus type 2  History of coronary artery disease requiring coronary artery stenting  Severe cardiomyopathy with reduced ejection fraction  Obesity  Right buttock pain likely due to gluteus medius muscle strain  Bilateral leg swelling     Ree Ojeda MD

## 2022-01-19 ENCOUNTER — APPOINTMENT (OUTPATIENT)
Dept: INTERVENTIONAL RADIOLOGY/VASCULAR | Age: 72
DRG: 092 | End: 2022-01-19
Attending: PHYSICAL MEDICINE & REHABILITATION
Payer: MEDICARE

## 2022-01-19 LAB — GLUCOSE BLD-MCNC: 184 MG/DL (ref 70–108)

## 2022-01-19 PROCEDURE — 6370000000 HC RX 637 (ALT 250 FOR IP): Performed by: PHYSICAL MEDICINE & REHABILITATION

## 2022-01-19 PROCEDURE — 97530 THERAPEUTIC ACTIVITIES: CPT

## 2022-01-19 PROCEDURE — 1180000000 HC REHAB R&B

## 2022-01-19 PROCEDURE — 6360000002 HC RX W HCPCS: Performed by: PHYSICAL MEDICINE & REHABILITATION

## 2022-01-19 PROCEDURE — 97129 THER IVNTJ 1ST 15 MIN: CPT

## 2022-01-19 PROCEDURE — 97535 SELF CARE MNGMENT TRAINING: CPT

## 2022-01-19 PROCEDURE — 97116 GAIT TRAINING THERAPY: CPT

## 2022-01-19 PROCEDURE — 99232 SBSQ HOSP IP/OBS MODERATE 35: CPT | Performed by: PHYSICAL MEDICINE & REHABILITATION

## 2022-01-19 PROCEDURE — 93970 EXTREMITY STUDY: CPT

## 2022-01-19 PROCEDURE — 82948 REAGENT STRIP/BLOOD GLUCOSE: CPT

## 2022-01-19 PROCEDURE — 92526 ORAL FUNCTION THERAPY: CPT

## 2022-01-19 PROCEDURE — 97110 THERAPEUTIC EXERCISES: CPT

## 2022-01-19 RX ORDER — SENNA PLUS 8.6 MG/1
1 TABLET ORAL 2 TIMES DAILY PRN
Status: DISCONTINUED | OUTPATIENT
Start: 2022-01-19 | End: 2022-01-28 | Stop reason: HOSPADM

## 2022-01-19 RX ADMIN — Medication 8.8 MG: at 09:10

## 2022-01-19 RX ADMIN — SENNOSIDES 8.6 MG: 8.6 TABLET, COATED ORAL at 21:28

## 2022-01-19 RX ADMIN — DOCUSATE SODIUM 100 MG: 100 CAPSULE, LIQUID FILLED ORAL at 21:28

## 2022-01-19 RX ADMIN — FUROSEMIDE 40 MG: 40 TABLET ORAL at 09:09

## 2022-01-19 RX ADMIN — FLUCONAZOLE 200 MG: 200 TABLET ORAL at 09:09

## 2022-01-19 RX ADMIN — MICONAZOLE NITRATE: 2 POWDER TOPICAL at 21:30

## 2022-01-19 RX ADMIN — ENOXAPARIN SODIUM 30 MG: 100 INJECTION SUBCUTANEOUS at 09:09

## 2022-01-19 RX ADMIN — ENOXAPARIN SODIUM 30 MG: 100 INJECTION SUBCUTANEOUS at 21:27

## 2022-01-19 RX ADMIN — ROSUVASTATIN 10 MG: 10 TABLET, FILM COATED ORAL at 21:28

## 2022-01-19 RX ADMIN — GLIPIZIDE 5 MG: 5 TABLET ORAL at 18:17

## 2022-01-19 RX ADMIN — FAMOTIDINE 20 MG: 20 TABLET, FILM COATED ORAL at 09:09

## 2022-01-19 RX ADMIN — CALCIUM POLYCARBOPHIL 625 MG: 625 TABLET, FILM COATED ORAL at 09:09

## 2022-01-19 RX ADMIN — ASPIRIN 81 MG CHEWABLE TABLET 81 MG: 81 TABLET CHEWABLE at 09:09

## 2022-01-19 RX ADMIN — FAMOTIDINE 20 MG: 20 TABLET, FILM COATED ORAL at 21:28

## 2022-01-19 RX ADMIN — DOCUSATE SODIUM 100 MG: 100 CAPSULE, LIQUID FILLED ORAL at 09:14

## 2022-01-19 RX ADMIN — Medication 6 MG: at 21:27

## 2022-01-19 RX ADMIN — ACETAMINOPHEN 650 MG: 325 TABLET ORAL at 21:28

## 2022-01-19 RX ADMIN — GLIPIZIDE 5 MG: 5 TABLET ORAL at 07:59

## 2022-01-19 RX ADMIN — MICONAZOLE NITRATE: 2 POWDER TOPICAL at 09:20

## 2022-01-19 ASSESSMENT — ENCOUNTER SYMPTOMS
SORE THROAT: 0
DIARRHEA: 0
ABDOMINAL PAIN: 0
TROUBLE SWALLOWING: 0
SHORTNESS OF BREATH: 0
COUGH: 0
RHINORRHEA: 0
BACK PAIN: 0
VOMITING: 0
WHEEZING: 0
NAUSEA: 0
CONSTIPATION: 0

## 2022-01-19 ASSESSMENT — PAIN DESCRIPTION - LOCATION: LOCATION: HIP

## 2022-01-19 ASSESSMENT — PAIN SCALES - GENERAL
PAINLEVEL_OUTOF10: 0
PAINLEVEL_OUTOF10: 0
PAINLEVEL_OUTOF10: 8
PAINLEVEL_OUTOF10: 8

## 2022-01-19 ASSESSMENT — PAIN DESCRIPTION - PAIN TYPE: TYPE: ACUTE PAIN

## 2022-01-19 ASSESSMENT — PAIN DESCRIPTION - DESCRIPTORS: DESCRIPTORS: ACHING

## 2022-01-19 ASSESSMENT — PAIN DESCRIPTION - ORIENTATION: ORIENTATION: LEFT;RIGHT

## 2022-01-19 ASSESSMENT — PAIN DESCRIPTION - FREQUENCY: FREQUENCY: INTERMITTENT

## 2022-01-19 NOTE — PLAN OF CARE
Problem: DISCHARGE BARRIERS  Goal: Patient's continuum of care needs are met  Note: Team conference held Wednesday, 01/19/2022. Recommendations of the team were explained to the patient and spouse, Jennifer Ray, by KIARRA Del Rosario, and Dr. Ashwini Martinez. Team is recommending that patient continue on acute inpatient rehab for nine more days, with expected discharge date of Friday, 01/28/2022. Prior to discharge, team is recommending family education with spouse, Jennifer Ray. Following discharge, team is recommending home health care services for RN/PT/OT/ST/HHA. Care plan reviewed with patient and spouse, Jennifer Ray. Patient and spouse, Jennifer Ray, verbalized understanding of the plan of care and contributed to goal setting. SW to follow and maintain involvement in discharge planning.

## 2022-01-19 NOTE — PROGRESS NOTES
[address]  Occupational Therapy  Daily Note  Time:   Time In: 1000  Time Out: 1130  Timed Code Treatment Minutes: 90 Minutes  Minutes: 90          Date: 2022  Patient Name: Kina Juarez,   Gender: female      Room: [facility]68/068-A  MRN: [id number]  : 1950  (73 y.o.)  Referring Practitioner: Dr. Charles Alfonso  Diagnosis: personal history of COVID 19  Additional Pertinent Hx: Keyon Adams is a 61-year-old white female who presented to [facility] on 2021 with complaints of shortness of breath she has a past medical history lifetime non-smoker, CAD with stent placement in , hypertension, diabetes mellitus, dyslipidemia. Per report she presented to [facility] on  with complaints of hypoxia and shortness of breath. She was initially placed on 4 L nasal cannula.  patient had persistent hypoxia and was transitioned to high flow nasal cannula. On  patient was transitioned to BiPAP and transferred to [facility] ICU for intubation. Pt extubated . Restrictions/Precautions:  Restrictions/Precautions: General Precautions,Fall Risk  Position Activity Restriction  Other position/activity restrictions: out of COVID isolation 22, monitor BP     SUBJECTIVE: Pt seated in bedside chair upon arrival, agreeable to OT session. PAIN: No c/o. Vitals: Oxygen: Patient on O2 via Room Air  upon arrival to room. Patient O2 sats at >90 %. Upon activity patient dropping O2 at 86-88%. Pt requiring min rest break(s) to recover. Heart Rate: 's    COGNITION: Slow Processing, Decreased Recall, Decreased Insight, Impaired Memory and Decreased Problem Solving    ADL:   Grooming: Stand By Assistance. Washing face while seated on TTB. Bathing: Minimal Assistance. Rocco washing distal BLE and washing bottom. CGA for balance to complete flaquito care standing with 1 UE release from grab bars.  SBA for UB care and washing BLE above knees seated on TTB. Upper Extremity Dressing: Minimal Assistance. Rocco donning robe around back. SBA Donning tshirt overhead while seated. Lower Extremity Dressing: Minimal Assistance. Rocco utilizing LHAE threading BLE into undergarment/pants and doffing/donning socks onto B feet. (See additional Activity)  Toileting: Moderate Assistance. For flaquito care after void. Toilet Transfer: Contact Guard Assistance. RTS  Shower Transfer: [employer] and with verbal cues . From w/c to walk-in to/from TTB. Sin Case BALANCE:  Sitting Balance:  Stand By Assistance. Standing Balance: Contact Guard Assistance. x1-2 minutes within ADLs, multiple trials    BED MOBILITY:  Not Tested    TRANSFERS:  Sit to Stand:  Contact Guard Assistance. Stand to Sit: Contact Guard Assistance. Comment: To/from bedside chair, w/c.    FUNCTIONAL MOBILITY:  Assistive Device: Rolling Walker  Assist Level:  Contact Guard Assistance. Distance:   Completed functional mobility short distance x2 trials within pt room and ~6 ft within tub shower room to w/c at slow pace, no LOB noted. Pt requires min cues for walker safety- seated rest break after trial of mobility, mod fatigue noted. ADDITIONAL ACTIVITIES:  Pt educated on benefits of LHAE to increase independence with LB care. Therapist provided verbal education and demonstration for reacher, sock aid, long handled shoe horn, and dressing stick. Pt able to participate in hands-on trial reacher and sock aid with mod cues for sequencing and problem solving. Pt would benefit from continued education. ASSESSMENT:  Activity Tolerance:  Patient tolerance of  treatment: fair. Discharge Recommendations: Home with [address] [place] [address] and monitor for home health aide.   Equipment Recommendations: Equipment Needed: Yes  Mobility Devices: Walker  Plan: Times per week: 5x week/90 min, 1x week/30 min  Current Treatment Recommendations: Spero Pool Mobility Training,Endurance Training,Home Management Training,Patient/Caregiver Education & Training,Self-Care / ADL,Safety Education & Training    Patient Education  Patient Education: ADL's, Energy Conservation, Importance of Increasing Activity, Assistive Device Safety and Safety with transfers and mobility. Goals  Short term goals  Time Frame for Short term goals: 1 week  Short term goal 1: Pt to increase standing endurance to tolerate > 5 min with bilateral UE support and CGA in prep for hand release to complete clothign management  Short term goal 2: Pt to complete LB ADL tasks with use LHAE as needed with min A  Short term goal 3: Pt will complete BUE AROM greater than 90 degrees with no > than Min A and min VC to increase shoulder flexion to reach outside base of support for needed object to complete ADL tasks  Short term goal 4: Pt to complete stand pivot transfers with min A consistently and no no cues for safety to increase indep with completing toilet transfers  Short term goal 5: Pt to participate in further assessment of mobility with OTR as her strength and endurance increases to increase indep with ADL tasks  Long term goals  Time Frame for Long term goals : 3 week  Long term goal 1: Pt to complete BADL routine with SBA and no cues for safety  Long term goal 2: Pt to complete sit to stand transfers from various surfaces including toilet with SBA and no ceus for safety  Long term goal 3: Pt to demo standing balance with unilateral UE handn release with SBA to complete clothign management after dressign and toielting    Following session, patient left in safe position with all fall risk precautions in place.

## 2022-01-19 NOTE — PROGRESS NOTES
[address] [place] [address]  INPATIENT PHYSICAL THERAPY  DAILY NOTE  [address]- [facility]    Time In: 0700  Time Out: 0800  Timed Code Treatment Minutes: 60 Minutes  Minutes: 60          Date: 2022  Patient Name: Barbara Sellers,  Gender:  female        MRN: [id number]  : 1950  (73 y.o.)     Referring Practitioner: Juan Miguel Santiago MD  Diagnosis: Personal history of COVID-19  Additional Pertinent Hx: Per Acute notes, \"Kaleigh Smith is a 24-year-old white female who presented to [facility] on 2021 with complaints of shortness of breath she has a past medical history lifetime non-smoker, CAD with stent placement in , hypertension, diabetes mellitus, dyslipidemia. Per report she presented to [facility] on  with complaints of hypoxia and shortness of breath. She was initially placed on 4 L nasal cannula.  patient had persistent hypoxia and was transitioned to high flow nasal cannula. On  patient was transitioned to BiPAP and transferred to [facility] ICU for intubation. Pt extubated . \"     Prior Level of Function:  Lives With: Spouse  Type of Home: House  Home Layout: One level  Home Access: Stairs to enter without rails  Entrance Stairs - Number of Steps: 1 step (very small)  Home Equipment:  (none)   Bathroom Shower/Tub: Walk-in shower (+ jaquazi)  Bathroom Toilet: Handicap height  Bathroom Equipment: Built-in shower seat (pt stating she has a bench right outside shower as well to sit on dry off)  Bathroom Accessibility: Accessible    ADL Assistance: Independent  Homemaking Assistance: Independent  Ambulation Assistance: Independent  Transfer Assistance: Independent  Active : Yes  IADL Comments: 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  Pt stating her children all live close and would be willing to help. Spouse in good health as well to assist.    Restrictions/Precautions:  Restrictions/Precautions: General Precautions,Fall Risk  Position Activity Restriction  Other position/activity restrictions: out of COVID isolation 1/6/22, monitor BP     SUBJECTIVE: Patient with RN getting out of bed due to requesting to use bathroom. Patient assisted into bathroom at beginning of session. Patient pleasant and agreeable to therapy. PAIN: 0/10    Vitals: Vitals not assessed per clinical judgement, see nursing flowsheet    OBJECTIVE:  Bed Mobility:  Supine to Sit: Moderate Assistance    Transfers:  Sit to Stand: Minimal Assistance  Stand to Sit:Minimal Assistance  -Patient required verbal cueing for hand placement and anterior weight shifting for increased ease of coming into a standing position  To/From Bed and Chair: Minimal Assistance  -Patient required verbal cueing  For hand placement    Ambulation:  Contact Guard Assistance  Distance: 13 feet, 21 feet and 23 feet  Surface: Level Tile  Device:Rolling Walker  Gait Deviations:  Decreased Step Length Bilaterally, Decreased Gait Speed, Narrow Base of Support and Unsteady Gait    Balance:  Dynamic Standing Balance: Contact Guard Assistance   -Patient stood in bathroom with 1UE support on grab bar to assist with donning and doffing pants. Patient required verbal cueing for upright posture and maintaining normalized DOMENICO for increased balance.  -Patient instructed in standing pod tapping with BUE support and initially 1 pod and progressed to 2 pods in a row. Patient required verbal cueing for maintaining upright posture and for slowed speed due to unsteadiness with pod tapping. Patient able to tolerate standing for ~1 minute and 1 minute 5 seconds intermittently prior to requiring a rest break. Patient instructed in pod tapping in order to assist with dynamic balance and coordination.     Stairs:  Minimal Assistance  Number of Steps: 1 step x 2 reps  Height: 4\" step with Bilateral Handrails   -Patient demonstrated increased reliance on BUE support for stepping onto 4 inch step. Patient initially trialed 6 inch step however unable to perform. Exercise:  Patient was guided in 1 set(s) 20 reps of exercise to both lower extremities. Seated marches, Seated hamstring curls, Seated heel/toe raises, Long arc quads, Seated isometric hip adduction and Seated abduction/adduction. Exercises were completed for increased independence with functional mobility. Functional Outcome Measures: Not completed       ASSESSMENT:  Assessment: Patient progressing toward established goals. Activity Tolerance:  Patient tolerance of  treatment: fair. Equipment Recommendations: Other: will monitor for needs pending progress and DC destination  Discharge Recommendations: Continue to assess pending progress and Patient would benefit from continued PT at discharge  Plan: Times per week: 5x/wk 90 min; 1x/wk 30 min  Current Treatment Recommendations: Strengthening,Positioning,Patient/Caregiver Education & Training,Safety Education & Training,Balance Training,Endurance Training,Functional Mobility Training,Neuromuscular Re-education,Gait Training,Transfer Training,Home Exercise Program,Equipment Evaluation, Education, & procurement    Patient Education  Patient Education: Transfers, Gait, Stairs, Verbal Exercise Instruction,  - Patient Verbalized Understanding, - Patient Requires Continued Education    Goals:  Patient goals : to get stronger  Short term goals  Time Frame for Short term goals: 1 week  Short term goal 1: Pt to be CGA for supine <> sit to get in/out of bed  Short term goal 2: Pt to be CGA for sit <> stand to get up to ambulate/transfer  Short term goal 3: Pt to be CGA for stand-pivot with RW to get between seats  Short term goal 4: Pt to ambulate > 10 ft with RW with Min A to get to bathroom  Short term goal 5: Pt to complete car transfer with Min A for transportation needs  Long term goals  Time Frame for Long term goals : 3 weeks  Long term goal 1: Pt to be Supervision for supine <> sit to get in/out of bed  Long term goal 2: Pt to be Supervision for sit <> stand to get up to ambulate/transfer  Long term goal 3: Pt to be Supervision for stand-pivot with RW to get between seats  Long term goal 4: Pt to ambulate > 50 ft with RW with SBA for household distances  Long term goal 5: Pt to complete car transfer with Supervision for transportation needs    Following session, patient left in safe position with all fall risk precautions in place.

## 2022-01-19 NOTE — PROGRESS NOTES
[address] [facility] THERAPY  [address] [facility]  DAILY NOTE    TIME   SLP Individual Minutes  Time In:   Time Out: 1300  Minutes: 30  Timed Code Treatment Minutes: 10 Minutes     Dysphagia tx: 20 minutes   Cog tx: 10 minutes     Date: 2022  Patient Name: Bro Keene      CSN: [id number]   : 1950  (73 y.o.)  Gender: female   Referring Physician:  Rach Mari MD  Diagnosis: Personal history of COVID-19  Secondary Diagnosis: Dysphagia, Cognitive Impairment  Precautions: Aspiration Risk, Fall Risk  Current Diet: Regular Texture with Thin - advanced 2022  Swallowing Strategies: Full Upright Position, Small Bite/Sip, Pulmonary Monitoring, Alternate Solids and Liquids, Limit Distractions and Monitor for Fatigue           Date of Last MBS/FEES: MBS 2022    Pain:  No pain reported. Subjective:  Patient sitting upright in recliner this date with spouse at bedside. Patient agreeable to skilled dietary analysis of regular texture meal tray this date. Highly motivated. Short-Term Goals:  SHORT TERM GOAL #1:  Goal 1: Patient wll consume a soft and bite-size diet with thin liquids while implementing recommended swallow strategies with no overt s/s of aspiration and adequate endurance to assist with meeting nutrition/hydration needs. -GOAL MET; REVISE   REVISED Goal 1: Patient will consume a regular texture diet with thin liquids while implementing recommended swallow strategies with no overt s/s of aspiration and adequate endurance to assist with meeting nutrition/hydration needs. INTERVENTIONS: Did not address this session d/t focus on other goals.       SHORT TERM GOAL #2:  Goal 2: Patient will consume advanced PO trials with ST while demonstrating timely/coordinated mastication, complete bolus clearance, no overt s/s of aspiration, and adeqaute endurance for potential diet advancement to least restrictive diet   INTERVENTIONS: ST completed skilled dietary analysis of a regular texture meal tray that consisted of chicken tenders, french fries, milk via straw, and water via straw. Patient with excellent independent carryover of recommended swallow strategies this date. Patient demonstrated evidence of adequate bolus control and manipulation, timely/coordinated mastication with effective textural breakdown, adequate bolus formation with complete bolus clearance, suspected timely swallow initiation, and intermittent throat clear observed during meal. Overall, excellent success with advanced meal tray trial.     **At this time, recommend patient advance to a regular texture diet with thin liquids while implementing current swallow strategies. SHORT TERM GOAL #3:  Goal 3: Patient will complete pharyngeal strengthening exercises x10-15 (effortful swallow, chris) to improve overall timeliness of swallow, airway protection, and decrease pharyngeal residue. INTERVENTIONS: DNT due to focus on additional STGs. SHORT TERM GOAL #4:  REVISED Goal 4: Patient will complete orientation and immediate/delayed/working memory tasks with 85% accuracy given min cuing in order to improve retention of novel information in current environment. INTERVENTIONS:   Delayed recall of ST Facts (~48 hours): 5/5 independently   *excellent delayed recall     Orientation  Place - independent    City - independent  Year- independent  Month - independent  SHANI - independent   Date - independent  Time - mod cues   *good spatial and temporal awareness without need for external aids        SHORT TERM GOAL #5:  REVISED Goal 5: Patient will complete verbal/visual reasoning and safety problem solving tasks with 85% accuracy given min cuing in order to improve independent completion of IADLs.   INTERVENTIONS: Did not address this session d/t focus on other goals       SHORT TERM GOAL #6:  REVISED Goal 6: Patient will complete executive functioning tasks (i.e., medications, finances, scheduling) with 85% accuracy given min cuing in order to improve independent completion of IADLs with support of family. INTERVENTIONS:  Calendar Task - Organize 10 events onto monthly calendar   5/5 events independently. *excellent organization of events  *encouraged patient organize remaining 5 events for HEP      Long-Term Goals:  Timeframe for Long-term Goals: 6 weeks    LONG TERM GOAL #1:  Goal 1: Patient will safely consume regular diet with thin liquids with implementation of compensatory swallowing strategies and withOUT overt s/s of aspiration in order to maintain adequate nutrition and hydration measures. LONG TERM GOAL #2:  Goal 2: Patient will improve cognitive functioning to at least a modified independent level in order to improve safety of IADL completion upon potential discharge to home environment. Comprehension: 6 - Complex ideas 90% or device (hearing aid or glasses- if patient is primarily a visual learner)  Expression: 6 - Device used to express complex ideas/needs  Social Interaction: 6 - Patient requires medication for mood and/or effect  Problem Solvin - Patient able to solve simple/routine tasks  Memory: 4 - Patient remembers 75-90%+ of the time    EDUCATION:  Learner: Patient and Significant Other  Education:  Reviewed diet and strategies, Reviewed ST goals and Plan of Care and Reviewed recommendations for follow-up  Evaluation of Education: Verbalizes understanding    ASSESSMENT/PLAN:  Activity Tolerance:  Patient tolerance of  treatment: good. Assessment/Plan: Patient progressing toward established goals. Continues to require skilled care of licensed speech pathologist to progress toward achievement of established goals and plan of care.     Plan for Next Session: Pharyngeal Strengthening Exercises, Reasoning/Problem Solving       Ángela (Marilu [address]al[address] 587) Roxana Perea M.A., [address]

## 2022-01-19 NOTE — PROGRESS NOTES
[address] [place] [address]  Recreational Therapy  Daily Note  [address]    Time Spent with Patient: 10 minutes    Date:  1/19/2022       Patient Name: Hiral Powell      MRN: [id number]      YOB: 1950 (73 y.o.)       Gender: female  Diagnosis: personal history of COVID 23  Referring Practitioner: Dr. Meera Ram    Patient enjoyed spending time with our pet therapy dogs.  Pt enjoyed petting our pet therapy dog May this afternoon with daughter and  visiting -affect bright and social     Electronically signed by: LIBBY Aguiar  Date: 1/19/2022

## 2022-01-19 NOTE — PROGRESS NOTES
[facility]  INPATIENT PHYSICAL THERAPY  Daily Note  [address] [facility]    Time In: 1430  Time Out: 1500  Timed Code Treatment Minutes: 30 Minutes  Minutes: 30          Date: 2022  Patient Name: Delfino Loyd,  Gender:  female        MRN: [id number]  : 1950  (73 y.o.)     Referring Practitioner: Romana Pedraza MD  Diagnosis: Personal history of COVID-19  Additional Pertinent Hx: Per Acute notes, \"Kaleigh Liang is a 82-year-old white female who presented to [facility] on 2021 with complaints of shortness of breath she has a past medical history lifetime non-smoker, CAD with stent placement in , hypertension, diabetes mellitus, dyslipidemia. Per report she presented to [facility] on  with complaints of hypoxia and shortness of breath. She was initially placed on 4 L nasal cannula.  patient had persistent hypoxia and was transitioned to high flow nasal cannula. On  patient was transitioned to BiPAP and transferred to [facility] ICU for intubation. Pt extubated . \"     Prior Level of Function:  Lives With: Spouse  Type of Home: House  Home Layout: One level  Home Access: Stairs to enter without rails  Entrance Stairs - Number of Steps: 1 step (very small)  Home Equipment:  (none)   Bathroom Shower/Tub: Walk-in shower (+ jaquazi)  Bathroom Toilet: Handicap height  Bathroom Equipment: Built-in shower seat (pt stating she has a bench right outside shower as well to sit on dry off)  Bathroom Accessibility: Accessible    ADL Assistance: Independent  Homemaking Assistance: Independent  Ambulation Assistance: Independent  Transfer Assistance: Independent  Active : Yes  IADL Comments: 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  Pt stating her children all live close and would Strengthening,Positioning,Patient/Caregiver Education & Training,Safety Education & Training,Balance Training,Endurance Training,Functional Mobility Training,Neuromuscular Re-education,Gait Training,Transfer Training,Home Exercise Program,Equipment Evaluation, Education, & procurement    Patient Education  Patient Education: Plan of Care, Transfers, Gait, Verbal Exercise Instruction, Breathing technique    Goals:  Patient goals : to get stronger  Short term goals  Time Frame for Short term goals: 1 week  Short term goal 1: Pt to be CGA for supine <> sit to get in/out of bed  Short term goal 2: Pt to be CGA for sit <> stand to get up to ambulate/transfer  Short term goal 3: Pt to be CGA for stand-pivot with RW to get between seats  Short term goal 4: Pt to ambulate > 10 ft with RW with Min A to get to bathroom  Short term goal 5: Pt to complete car transfer with Min A for transportation needs  Long term goals  Time Frame for Long term goals : 3 weeks  Long term goal 1: Pt to be Supervision for supine <> sit to get in/out of bed  Long term goal 2: Pt to be Supervision for sit <> stand to get up to ambulate/transfer  Long term goal 3: Pt to be Supervision for stand-pivot with RW to get between seats  Long term goal 4: Pt to ambulate > 50 ft with RW with SBA for household distances  Long term goal 5: Pt to complete car transfer with Supervision for transportation needs    Following session, patient left in safe position with all fall risk precautions in place.

## 2022-01-19 NOTE — PROGRESS NOTES
Physical Medicine & Rehabilitation Progress Note    Chief Complaint:  Fatigue and tired    Subjective:    Adelso Lozano is a 73 y.o. right-handed obese  female with history of hypertension, diabetes mellitus type 2, coronary artery disease requiring coronary artery stenting, status post bilateral eyes cataract surgeries, status post 3  sections, status post hysterectomy, status post hiatal hernia repair, status post sinus surgery, was admitted on 2022 for intensive inpatient management of impairment & disability secondary to critical illness myopathy and disability/physical decondition as result of COVID-19 pneumonia. The patient says he does not quite remember how she ended up in the hospital.     The patient apparently began experiencing reduced upper size, change in taste and smell, shortness of breath, nausea, dry heaves, and diarrhea starting in 2021. The symptom progressively worsened. She was advised by her PCP to visit ER. Therefore she went to [facility] ER on 2021. She was found to be hypoxic with O2 saturation in 70s% in room air. COVID test was performed which turned out to be positive. She was advised to be admitted but she left against medical advice because she preferred to be admitted to [address] [place] [address] instead. On 2021 she came to [address] [place] [address] ER with complaint of shortness of breath. She was found to have oxygen saturation in the 80s% in room air. She was admitted with diagnosis of acute hypoxic respiratory failure secondary to COVID-19 pneumonia. She was treated with Decadron and Olumiant (baricitnib). However her condition deteriorated despite of treatment. She was intubated on 2021 and was admitted to ICU. Her hospital course also was complicated by superimposed Klebsiella oxytocin bacterial pneumonia, bradycardia requiring dopamine infusion, and decubitus ulcer. Her condition later stabilized and improved. She was extubated on 1/2/2022. The patient says she feels well. She says she had urinary urgent and wet her body on the way to bathroom three time last night. She still has weakness especially at her hips but she says the muscle strength is getting better. She continues having easy fatigue with poor endurance. The lower extremities venous doppler study done yesterday was negative for DVT. She tolerates the intensive inpatient rehabilitation treatment well. She says her function is improving. She is projected to be ready for discharge on 1/28/2022. Rehabilitation:  PT: Reviewed. Transfers:  Sit to Stand: Contact Guard Assistance, Minimal Assistance, with increased time for completion, cues for hand placement  Stand to [place] [id number], Minimal Assistance, with increased time for completion, cues for hand placement   -Patient required verbal cueing for anterior weight shifting and hand placement with verbal cueing for eccentric lowering control      Ambulation:  Contact Guard Assistance  Distance: 62 feet and 65 feet  Surface: Level Tile  Device:Rolling Walker  Gait Deviations: Forward Flexed Posture, Decreased Gait Speed and Decreased Heel Strike Bilaterally     -Patient instructed in weaving in and out of cones with RW and CGA. Patient required verbal cueing for increased step and stride length and for maintaining body inside AD with turning. Patient instructed in weaving in and out of cones in order to assist with obstacle negotiation.      Stairs:  Contact Guard Assistance, with increased time for completion  Number of Steps: 1   Height: 4\" step with Rolling Walker   -Patient instructed in forward step ups onto 4 inch platform step with verbal cueing for placement of walker. Patient able to perform x 4 reps and x 5 reps intermittently. Patient became fatigued quickly with step ups.  Patient instructed in forward step ups in order to assist with BLE strengthening, endurance and stair training.        OT: Reviewed. COGNITION: Slow Processing, Decreased Recall, Decreased Insight, Impaired Memory and Decreased Problem Solving     ADL:   Grooming: Stand By Assistance. Washing face while seated on TTB. Bathing: Minimal Assistance. Rocco washing distal BLE and washing bottom. CGA for balance to complete flaquito care standing with 1 UE release from grab bars. SBA for UB care and washing BLE above knees seated on TTB. Upper Extremity Dressing: Minimal Assistance. Rocco donning robe around back. SBA Donning tshirt overhead while seated. Lower Extremity Dressing: Minimal Assistance. Rocco utilizing LHAE threading BLE into undergarment/pants and doffing/donning socks onto B feet. (See additional Activity)  Toileting: Moderate Assistance. For flaquito care after void. Toilet Transfer: Contact Guard Assistance. RTS  Shower Transfer: [address] and with verbal cues . From w/c to walk-in to/from TTB. .     BALANCE:  Sitting Balance:  Stand By Assistance. Standing Balance: Contact Guard Assistance. x1-2 minutes within ADLs, multiple trials     TRANSFERS:  Sit to Stand:  Contact Guard Assistance. Stand to Sit: Contact Guard Assistance. Comment: To/from bedside chair, w/c.     FUNCTIONAL MOBILITY:  Assistive Device: Rolling Walker  Assist Level:  Contact Guard Assistance. Distance:   Completed functional mobility short distance x2 trials within pt room and ~6 ft within tub shower room to w/c at slow pace, no LOB noted. Pt requires min cues for walker safety- seated rest break after trial of mobility, mod fatigue noted.      ADDITIONAL ACTIVITIES:  Pt educated on benefits of LHAE to increase independence with LB care. Therapist provided verbal education and demonstration for reacher, sock aid, long handled shoe horn, and dressing stick. Pt able to participate in hands-on trial reacher and sock aid with mod cues for sequencing and problem solving.  Pt would benefit from continued education. ST: Reviewed. Deductive reasoning puzzle - indep x4, min cues x2; discontinued due to time constraints, though good success to begin   *recommended finishing remainder of puzzle as HEP; patient receptive and verbalized understanding      Complex math related to prices on menu - 5/5   *adequate use of self talk throughout to assist with mental math   *excellent sustained attention to task   *task duration - 20 minutes; slightly prolonged, however functional, given overall success       Review of Systems:  Review of Systems   Constitutional: Positive for fatigue. Negative for chills, diaphoresis and fever. HENT: Negative for hearing loss, rhinorrhea, sneezing, sore throat and trouble swallowing. Eyes: Negative for visual disturbance. Respiratory: Negative for cough, shortness of breath and wheezing. Cardiovascular: Negative for chest pain and palpitations. Gastrointestinal: Negative for abdominal pain, constipation, diarrhea, nausea and vomiting. Genitourinary: Negative for dysuria. Musculoskeletal: Positive for gait problem. Negative for arthralgias, back pain, myalgias and neck pain. Skin: Negative for rash. Neurological: Positive for weakness (Generalized). Negative for dizziness, tremors, light-headedness, numbness and headaches. Psychiatric/Behavioral: Negative for dysphoric mood, hallucinations and sleep disturbance. The patient is not nervous/anxious.          Objective:  /70   Pulse 83   Temp 98.1 °F (36.7 °C) (Oral)   Resp 16   Ht 5' 2\" (1.575 m)   Wt 196 lb 4.8 oz (89 kg)   LMP  (LMP Unknown)   SpO2 96%   Breastfeeding No   BMI 35.90 kg/m²   Physical Exam   General:  well-developed, well nourished obese  female ; in no acute distress ; appropriate affect & mood; sitting on wheelchair comfortably  Eyes: pupil equally round ; extra-ocular motion intact bilaterally  Head, Ear, Nose, Mouth & Throat : normocephalic ; no discharge from ears or nose ; no deformity ; no facial swelling ; oral mucosa pink   Neck :  supple ; no tenderness ; no muscle spasm  Cardiovascular : regular rate & rhythm ; normal S1 & S2 heart sound ; no murmur ; normal peripheral pulse at bilateral upper & lower extremities  Pulmonary : Breath sounds present at bilateral lung fields; no wheezing ; no rale; no crackle  Gastrointestinal : soft, protruded abdomen without tenderness ; normal bowel sound present   Back : no tenderness; no muscle spasm  Skin: no skin lesion or rash ; presence of mild nonpitting swelling at bilateral legs but no pitting edema at all 4 extremities  Musculoskeletal : no limb asymmetry; no limb deformity; tenderness at right upper buttock over the gluteus medius muscle; otherwise no tenderness at bilateral upper extremities & the rest of bilateral lower extremities; no palpable mass at limbs ; no joints laxity or crepitation ; hip flexion passive ROM reaching 90 degrees bilaterally; ankle dorsiflexion passive ROM reaching 5 degrees bilaterally; otherwise normal functional joints ROM at the rest of bilateral upper & lower extremities  Cerebral :  alert ; awake ; oriented to place, person and time; follow two-step verbal command  Cerebellum : no dysmetria with bilateral finger-to-nose test ; unable to perform  heel-to-shin test on both sides  Cranial Nerves :  grossly intact CN II to XII function  Sensory : intact light touch and pin prick sensation at bilateral upper & lower extremities  Motor : normal tone at bilateral upper & lower extremities ; 4+5 to 5/5 muscle strength at the bilateral fingers flexion, extension and abduction; 4-/5 muscle strength at right hip flexion; 4-/5 muscle strength at left hip flexion; 4+/5 to 5/5 muscle strength at the bilateral knee flexion; otherwise 5/5 muscle strength at the rest of bilateral upper and the lower extremities  Reflex : 0 bilateral biceps, bilateral brachioradialis and bilateral knees reflexes   Pathological Reflex : No Roberta's sign ; no ankle clonus  Gait : Not assessed      Diagnostics:   Recent Results (from the past 24 hour(s))   POCT glucose    Collection Time: 01/20/22  8:30 AM   Result Value Ref Range    POC Glucose 156 (H) 70 - 108 mg/dl     Results for Vipul Hinds (MRN [id number]) as of 1/20/2022 09:20   Ref. Range 1/17/2022 08:09 1/18/2022 07:54 1/18/2022 09:22 1/19/2022 08:31 1/20/2022 08:30   POC Glucose Latest Ref Range: 70 - 108 mg/dl 144 (H) 171 (H) 177 (H) 184 (H) 156 (H)       Bilateral lower extremities venous duplex study (1/19/2022) : Impression   No evidence of a DVT. Impression:  Critical illness myopathy   Debility/physical decondition secondary to QIXDV-18 pneumonia, complicated by bacterial pneumonia, bradycardia, and decubitus ulcer  History of COVID-19 pneumonia with acute hypoxic respiratory failure requiring intubation, complicated by Klebsiella oxytocin bacterial pneumonia  Mild dysphagia/moderate pharyngeal dysphagia  Mild cognitive impairment  Hypertension  Diabetes mellitus type 2  History of coronary artery disease requiring coronary artery stenting  Severe cardiomyopathy with reduced ejection fraction  Obesity  Right buttock pain likely due to gluteus medius muscle strain  Bilateral leg swelling, to rule out deep vein thrombosis    The patient's condition is stable. Lower extremity Doppler study revealed no evidence of DVT. She still has generalized weakness but the muscle strength is improving. She still has easy fatigue with reduced endurance. She continues tolerating the intensive inpatient rehabilitation treatment well. Her function continues to improve slowly and steadily. The patient currently is projected to be ready for discharge on 1/28/2022. Plan:  Continue intensive PT/OT/SLP/RT inpatient rehabilitation program at least 3 hours per day, 5 days per week in order to improve functional status prior to discharge. Family education and training will be completed. Equipment evaluations and recommendations will be completed as appropriate. Rehabilitation nursing continue to be involved for bowel, bladder, skin, and pain management. Nursing will also provide education and training to patient and family. Prophylaxis:  DVT: Lovenox, KENA stocking, intermittent pneumatic compression device. GI: Colace, FiberCon, Dulcolax suppository as needed, milk of magnesium as needed, GlycoLax as needed, Senokot as needed.   Pain: Tylenol as needed; Voltaren gel as needed; heat pad application as needed  Continue aspirin, Crestor for coronary artery disease  Continue Pepcid for gastric protection  Continue Lasix for hypertension  Continue Crestor for hyperlipidemia  Continue glipizide, Lantus insulin and Humalog insulin coverage for diabetes mellitus  Continue albuterol inhaler as needed for COVID-19 pneumonia  Melatonin as needed for insomnia  Nutrition:   Continue current diet  Bladder: Monitoring signs or symptoms of UTI  Bowel: Monitoring signs or symptoms of constipation   and case management for coordination of care and discharge planning      Missed Therapy Time:  None      Joo Cruz MD

## 2022-01-20 LAB — GLUCOSE BLD-MCNC: 156 MG/DL (ref 70–108)

## 2022-01-20 PROCEDURE — 97530 THERAPEUTIC ACTIVITIES: CPT

## 2022-01-20 PROCEDURE — 6360000002 HC RX W HCPCS: Performed by: PHYSICAL MEDICINE & REHABILITATION

## 2022-01-20 PROCEDURE — 97110 THERAPEUTIC EXERCISES: CPT

## 2022-01-20 PROCEDURE — 97116 GAIT TRAINING THERAPY: CPT

## 2022-01-20 PROCEDURE — 82948 REAGENT STRIP/BLOOD GLUCOSE: CPT

## 2022-01-20 PROCEDURE — 99232 SBSQ HOSP IP/OBS MODERATE 35: CPT | Performed by: PHYSICAL MEDICINE & REHABILITATION

## 2022-01-20 PROCEDURE — 1180000000 HC REHAB R&B

## 2022-01-20 PROCEDURE — 97535 SELF CARE MNGMENT TRAINING: CPT

## 2022-01-20 PROCEDURE — 6370000000 HC RX 637 (ALT 250 FOR IP): Performed by: PHYSICAL MEDICINE & REHABILITATION

## 2022-01-20 PROCEDURE — 97130 THER IVNTJ EA ADDL 15 MIN: CPT

## 2022-01-20 PROCEDURE — 97129 THER IVNTJ 1ST 15 MIN: CPT

## 2022-01-20 RX ADMIN — GLIPIZIDE 5 MG: 5 TABLET ORAL at 17:02

## 2022-01-20 RX ADMIN — FUROSEMIDE 40 MG: 40 TABLET ORAL at 08:36

## 2022-01-20 RX ADMIN — DOCUSATE SODIUM 100 MG: 100 CAPSULE, LIQUID FILLED ORAL at 08:36

## 2022-01-20 RX ADMIN — CALCIUM POLYCARBOPHIL 625 MG: 625 TABLET, FILM COATED ORAL at 08:36

## 2022-01-20 RX ADMIN — ENOXAPARIN SODIUM 30 MG: 100 INJECTION SUBCUTANEOUS at 20:47

## 2022-01-20 RX ADMIN — FAMOTIDINE 20 MG: 20 TABLET, FILM COATED ORAL at 08:36

## 2022-01-20 RX ADMIN — MICONAZOLE NITRATE: 2 POWDER TOPICAL at 20:47

## 2022-01-20 RX ADMIN — GLIPIZIDE 5 MG: 5 TABLET ORAL at 08:39

## 2022-01-20 RX ADMIN — ROSUVASTATIN 10 MG: 10 TABLET, FILM COATED ORAL at 20:47

## 2022-01-20 RX ADMIN — Medication 6 MG: at 20:46

## 2022-01-20 RX ADMIN — FAMOTIDINE 20 MG: 20 TABLET, FILM COATED ORAL at 20:47

## 2022-01-20 RX ADMIN — ENOXAPARIN SODIUM 30 MG: 100 INJECTION SUBCUTANEOUS at 08:36

## 2022-01-20 RX ADMIN — ASPIRIN 81 MG CHEWABLE TABLET 81 MG: 81 TABLET CHEWABLE at 08:36

## 2022-01-20 RX ADMIN — DOCUSATE SODIUM 100 MG: 100 CAPSULE, LIQUID FILLED ORAL at 20:47

## 2022-01-20 RX ADMIN — ACETAMINOPHEN 650 MG: 325 TABLET ORAL at 18:12

## 2022-01-20 RX ADMIN — FLUCONAZOLE 200 MG: 200 TABLET ORAL at 08:36

## 2022-01-20 RX ADMIN — MICONAZOLE NITRATE: 2 POWDER TOPICAL at 08:39

## 2022-01-20 ASSESSMENT — ENCOUNTER SYMPTOMS
RHINORRHEA: 0
CONSTIPATION: 0
SHORTNESS OF BREATH: 0
COUGH: 0
SORE THROAT: 0
BACK PAIN: 0
NAUSEA: 0
VOMITING: 0
ABDOMINAL PAIN: 0
TROUBLE SWALLOWING: 0
WHEEZING: 0
DIARRHEA: 0

## 2022-01-20 ASSESSMENT — PAIN SCALES - GENERAL
PAINLEVEL_OUTOF10: 0
PAINLEVEL_OUTOF10: 0
PAINLEVEL_OUTOF10: 5
PAINLEVEL_OUTOF10: 0

## 2022-01-20 NOTE — PROGRESS NOTES
[address]  Occupational Therapy  Daily Note  Time:   Time In: 1130  Time Out: 1200  Timed Code Treatment Minutes: 30 Minutes  Minutes: 30          Date: 2022  Patient Name: Wesly Carpenter,   Gender: female      Room: [facility]/068-A  MRN: [id number]  : 1950  (73 y.o.)  Referring Practitioner: Dr. Valdez Servant  Diagnosis: personal history of COVID 19  Additional Pertinent Hx: Naresh Bender is a 42-year-old white female who presented to [facility] on 2021 with complaints of shortness of breath she has a past medical history lifetime non-smoker, CAD with stent placement in , hypertension, diabetes mellitus, dyslipidemia. Per report she presented to [facility] on  with complaints of hypoxia and shortness of breath. She was initially placed on 4 L nasal cannula.  patient had persistent hypoxia and was transitioned to high flow nasal cannula. On  patient was transitioned to BiPAP and transferred to [facility] ICU for intubation. Pt extubated . Restrictions/Precautions:  Restrictions/Precautions: General Precautions,Fall Risk  Position Activity Restriction  Other position/activity restrictions: out of COVID isolation 22, monitor BP      SUBJECTIVE: Pt seated in apartment finishing with Denia FREEMAN. Pt agreeable to continue OT with this writer. PAIN: 0/10:     Vitals: Oxygen: 94% at rest,   desatted to 84% during activity. PT educated on monitoring oxygen levels at home with pulse ox. Pt required 1 cue to initiate purse lipped breathing. COGNITION: Tangential    IADL:   Patient given homemaking scenario of picking up dishes and trash on floor. Pt completed IADL homemaking task this date of gathering dishes from tabletop, wiping countertops, and picking up napkins from floor. Task was graded to challenge walker safety and comprehension of ECT and oxygen readings.  Pt given pulse ox  Patient was able to retrieve all items from tabletop and floor and CGA and min vcs for walker safety. VCs for safety during the retrieval and transportation of items. Patient required CGA throughout task with a standing endurance of 5 minutes. Education on ECT concepts provided with pt returning demo    BALANCE:  Sitting Balance:  Supervision. Standing Balance: Contact Guard Assistance. TRANSFERS:  Sit to Stand:  [employer], with increased time for completion, cues for hand placement. Stand to Sit: [employer], with increased time for completion, cues for hand placement. FUNCTIONAL MOBILITY:  Assistive Device: Rolling Walker  Assist Level:  Contact Guard Assistance. Distance: within kitchen   Minimal cues for safety/posture     ASSESSMENT:  Activity Tolerance:  Patient tolerance of  treatment: good. Discharge Recommendations: Continue to assess pending progress  Equipment Recommendations: Equipment Needed: Yes  Mobility Devices: Walker  Other: Recommend LHAE kit and shower chair.   Plan: Times per week: 5x week/90 min, 1x week/30 min  Current Treatment Recommendations: Strengthening,Balance Training,Functional Mobility Training,Endurance Training,Home Management Training,Patient/Caregiver Education & Training,Self-Care / ADL,Safety Education & Training    Patient Education  Patient Education: Plan of Care, IADL's, Home Exercise Program and Equipment Education    Goals  Short term goals  Time Frame for Short term goals: 1 week  Short term goal 1: Pt to increase standing endurance to tolerate > 5 min with bilateral UE support and CGA in prep for hand release to complete clothign management  Short term goal 2: Pt to complete LB ADL tasks with use LHAE as needed with min A  Short term goal 3: Pt will complete BUE AROM greater than 90 degrees with no > than Min A and min VC to increase shoulder flexion to reach outside base of support for needed object to complete ADL tasks  Short term goal 4: Pt to complete stand pivot transfers with min A consistently and no no cues for safety to increase indep with completing toilet transfers  Short term goal 5: Pt to participate in further assessment of mobility with OTR as her strength and endurance increases to increase indep with ADL tasks  Long term goals  Time Frame for Long term goals : 3 week  Long term goal 1: Pt to complete BADL routine with SBA and no cues for safety  Long term goal 2: Pt to complete sit to stand transfers from various surfaces including toilet with SBA and no ceus for safety  Long term goal 3: Pt to demo standing balance with unilateral UE handn release with SBA to complete clothign management after dressign and toielting    Following session, patient left in safe position with all fall risk precautions in place.

## 2022-01-20 NOTE — PROGRESS NOTES
[facility]  INPATIENT PHYSICAL THERAPY  DAILY NOTE  [address]- [facility]    Time In: 0700  Time Out: 0800  Timed Code Treatment Minutes: 60 Minutes  Minutes: 60          Date: 2022  Patient Name: Jourdan Trevino,  Gender:  female        MRN: [id number]  : 1950  (73 y.o.)     Referring Practitioner: Nasim Diallo MD  Diagnosis: Personal history of COVID-19  Additional Pertinent Hx: Per Acute notes, \"Kaleigh Arzola is a 24-year-old white female who presented to [facility] on 2021 with complaints of shortness of breath she has a past medical history lifetime non-smoker, CAD with stent placement in , hypertension, diabetes mellitus, dyslipidemia. Per report she presented to [facility] on  with complaints of hypoxia and shortness of breath. She was initially placed on 4 L nasal cannula.  patient had persistent hypoxia and was transitioned to high flow nasal cannula. On  patient was transitioned to BiPAP and transferred to [facility] ICU for intubation. Pt extubated . \"     Prior Level of Function:  Lives With: Spouse  Type of Home: House  Home Layout: One level  Home Access: Stairs to enter without rails  Entrance Stairs - Number of Steps: 1 step (very small)  Home Equipment:  (none)   Bathroom Shower/Tub: Walk-in shower (+ jaquazi)  Bathroom Toilet: Handicap height  Bathroom Equipment: Built-in shower seat (pt stating she has a bench right outside shower as well to sit on dry off)  Bathroom Accessibility: Accessible    ADL Assistance: Independent  Homemaking Assistance: Independent  Ambulation Assistance: Independent  Transfer Assistance: Independent  Active : Yes  IADL Comments: 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  Pt stating her children all live close and would be willing to help. Spouse in good health as well to assist.    Restrictions/Precautions:  Restrictions/Precautions: General Precautions,Fall Risk  Position Activity Restriction  Other position/activity restrictions: out of [facility] isolation 1/6/22, monitor BP     SUBJECTIVE: Patient with RN in bathroom upon arrival. Patient is pleasant and agreeable to therapy. Patient is very talkative throughout session requiring frequent redirection. PAIN: 0/10    Vitals: Vitals not assessed per clinical judgement, see nursing flowsheet    OBJECTIVE:  Bed Mobility:  Not Tested    Transfers:  Sit to Stand: Contact Guard Assistance, Minimal Assistance, with increased time for completion, cues for hand placement  Stand to [place] [id number], Minimal Assistance, with increased time for completion, cues for hand placement   -Patient required verbal cueing for anterior weight shifting and hand placement with verbal cueing for eccentric lowering control     Ambulation:  Contact Guard Assistance  Distance: 62 feet and 65 feet  Surface: Level Tile  Device:Rolling Walker  Gait Deviations: Forward Flexed Posture, Decreased Gait Speed and Decreased Heel Strike Bilaterally    -Patient instructed in weaving in and out of cones with RW and CGA. Patient required verbal cueing for increased step and stride length and for maintaining body inside AD with turning. Patient instructed in weaving in and out of cones in order to assist with obstacle negotiation. Stairs:  Contact Guard Assistance, with increased time for completion  Number of Steps: 1   Height: 4\" step with Rolling Walker   -Patient instructed in forward step ups onto 4inch platform step with verbal cueing for placement of walker. Patient able to perform x 4 reps and x 5 reps intermittently. Patient became fatigued quickly with step ups. Patient instructed in forward step ups in order to assist with BLE strengthening, endurance and stair training. Exercise:  Patient was guided in 1 set(s) 20 reps of exercise to both lower extremities. Seated marches, Seated hamstring curls, Seated heel/toe raises, Long arc quads, Seated isometric hip adduction and Seated abduction/adduction. Exercises were completed for increased independence with functional mobility.  -Patient requires verbal cueing for proper form . Functional Outcome Measures: Not completed       ASSESSMENT:  Assessment: Patient progressing toward established goals. Activity Tolerance:  Patient tolerance of  treatment: good. Equipment Recommendations: Other: will monitor for needs pending progress and DC destination  Discharge Recommendations: Home with Home Health PT  Plan: Times per week: 5x/wk 90 min; 1x/wk 30 min  Current Treatment Recommendations: Strengthening,Positioning,Patient/Caregiver Education & Training,Safety Education & Training,Balance Training,Endurance Training,Functional Mobility Training,Neuromuscular Re-education,Gait Training,Transfer Training,Home Exercise Program,Equipment Evaluation, Education, & procurement    Patient Education  Patient Education: Transfers, Gait, Stairs, Verbal Exercise Instruction,  - Patient Verbalized Understanding, - Patient Requires Continued Education    Goals:  Patient goals : to get stronger  Short term goals  Time Frame for Short term goals: 1 week  Short term goal 1: Pt to be CGA for supine <> sit to get in/out of bed  Short term goal 2: Pt to be CGA for sit <> stand to get up to ambulate/transfer  Short term goal 3: Pt to be CGA for stand-pivot with RW to get between seats  Short term goal 4: Pt to ambulate > 10 ft with RW with Min A to get to bathroom  Short term goal 5: Pt to complete car transfer with Min A for transportation needs  Long term goals  Time Frame for Long term goals : 3 weeks  Long term goal 1: Pt to be Supervision for supine <> sit to get in/out of bed  Long term goal 2: Pt to be Supervision for sit <> stand to get up to ambulate/transfer  Long term goal 3: Pt to be Supervision for stand-pivot with RW to get between seats  Long term goal 4: Pt to ambulate > 50 ft with RW with SBA for household distances  Long term goal 5: Pt to complete car transfer with Supervision for transportation needs    Following session, patient left in safe position with all fall risk precautions in place.

## 2022-01-20 NOTE — PROGRESS NOTES
[address] [address]  INPATIENT PHYSICAL THERAPY  Daily Note  [facility]    Time In: 1330  Time Out: 1400  Timed Code Treatment Minutes: 30 Minutes  Minutes: 30          Date: 2022  Patient Name: Juana Mallory,  Gender:  female        MRN: [id number]  : 1950  (73 y.o.)     Referring Practitioner: Raj Martínez MD  Diagnosis: Personal history of COVID-19  Additional Pertinent Hx: Per Acute notes, \"Kaleigh Gutierrez is a 81-year-old white female who presented to [facility] on 2021 with complaints of shortness of breath she has a past medical history lifetime non-smoker, CAD with stent placement in , hypertension, diabetes mellitus, dyslipidemia. Per report she presented to [facility] on  with complaints of hypoxia and shortness of breath. She was initially placed on 4 L nasal cannula.  patient had persistent hypoxia and was transitioned to high flow nasal cannula. On  patient was transitioned to BiPAP and transferred to [facility] ICU for intubation. Pt extubated . \"     Prior Level of Function:  Lives With: Spouse  Type of Home: House  Home Layout: One level  Home Access: Stairs to enter without rails  Entrance Stairs - Number of Steps: 1 step (very small)  Home Equipment:  (none)   Bathroom Shower/Tub: Walk-in shower (+ jaquazi)  Bathroom Toilet: Handicap height  Bathroom Equipment: Built-in shower seat (pt stating she has a bench right outside shower as well to sit on dry off)  Bathroom Accessibility: Accessible    ADL Assistance: Independent  Homemaking Assistance: Independent  Ambulation Assistance: Independent  Transfer Assistance: Independent  Active : Yes  IADL Comments: 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  Pt stating her children all live close and would be willing to help. Spouse in good health as well to assist.    Restrictions/Precautions:  Restrictions/Precautions: General Precautions,Fall Risk  Position Activity Restriction  Other position/activity restrictions: out of COVID isolation 1/6/22, monitor BP     SUBJECTIVE: Pt. Seated in WC and pleasantly agrees to therapy session. PAIN: None indicated    Vitals: Oxygen: 87% with activity requiring rest periods, 94% at rest  Heart Rate: 88-111BPM    OBJECTIVE:  Bed Mobility:  Not Tested    Transfers:  Sit to Stand: Contact Guard Assistance  Stand to Sit:Contact Guard Assistance    Ambulation:  None    Balance:  Pt. Completed standing dynamic balance activity: balloon volleyball with Normal DOMENICO on level tile and Single UE support on Rolling Walker with Stand By Assistance. Activity completed to improve balance, enhance functional mobility, and reduce risk of falls. Pt. Requiring seated rest breaks throughout secondary to fatigue. Pt. Completed standing dynamic balance activity: transferring clothes from washer to dryer on level tile with Single UE support on washer with Contact Guard Assistance. Activity completed to improve balance, enhance functional mobility, and reduce risk of falls. Pt. Easily fatigues and requires cues for breathing technique. Exercise:  Patient was guided in 1 set(s) 10 reps of exercises: Standing heel/toe raises, Standing marches, Standing hamstring curls, Mini squats. Exercises were completed for increased independence with functional mobility. Rest breaks required throughout. Functional Outcome Measures: Not completed       ASSESSMENT:  Assessment: Patient progressing toward established goals. Activity Tolerance:  Patient tolerance of  treatment: good. Equipment Recommendations: Other: will monitor for needs pending progress and DC destination  Discharge Recommendations: Continue to assess pending progress     Plan: Times per week: 5x/wk 90 min; 1x/wk 30 min  Current Treatment Recommendations: Strengthening,Positioning,Patient/Caregiver Education & Training,Safety Education & Training,Balance Training,Endurance Training,Functional Mobility Training,Neuromuscular Re-education,Gait Training,Transfer Training,Home Exercise Program,Equipment Evaluation, Education, & procurement    Patient Education  Patient Education: Plan of Care, Transfers, Reviewed Prior Education, Verbal Exercise Instruction    Goals:  Patient goals : to get stronger  Short term goals  Time Frame for Short term goals: 1 week  Short term goal 1: Pt to be CGA for supine <> sit to get in/out of bed  Short term goal 2: Pt to be CGA for sit <> stand to get up to ambulate/transfer  Short term goal 3: Pt to be CGA for stand-pivot with RW to get between seats  Short term goal 4: Pt to ambulate > 10 ft with RW with Min A to get to bathroom  Short term goal 5: Pt to complete car transfer with Min A for transportation needs  Long term goals  Time Frame for Long term goals : 3 weeks  Long term goal 1: Pt to be Supervision for supine <> sit to get in/out of bed  Long term goal 2: Pt to be Supervision for sit <> stand to get up to ambulate/transfer  Long term goal 3: Pt to be Supervision for stand-pivot with RW to get between seats  Long term goal 4: Pt to ambulate > 50 ft with RW with SBA for household distances  Long term goal 5: Pt to complete car transfer with Supervision for transportation needs    Following session, patient left in safe position with all fall risk precautions in place.

## 2022-01-20 NOTE — PLAN OF CARE
Problem: Falls - Risk of:  Goal: Will remain free from falls  Description: Will remain free from falls  Outcome: Ongoing  Pt will remain free of falls. Pt is 1 assist with walker and gait belt, pt is now doing short walks and/or stand pivot. Problem: Skin Integrity:  Goal: Will show no infection signs and symptoms  Description: Will show no infection signs and symptoms  Outcome: Ongoing  Skin assessment every shift. No new skin breakdowns noted. Problem: Mobility - Impaired:  Goal: Mobility will improve  Description: Mobility will improve  Outcome: Ongoing  Pt will participate in PT daily as directed to increase mobility. Problem: Pain:  Goal: Pain level will decrease  Description: Pain level will decrease  Outcome: Ongoing  Reposition frequently to alleviate pain. Problem: Breathing Pattern - Ineffective:  Goal: Ability to achieve and maintain a regular respiratory rate will improve  Description: Ability to achieve and maintain a regular respiratory rate will improve  Outcome: Ongoing  Pt demonstrates effective use of incentive spirometer, but not always as often as she is encouraged.

## 2022-01-20 NOTE — PROGRESS NOTES
[address] [address]  Recreational Therapy  Daily Note  [address]    Time Spent with Patient: 15 minutes    Date:  1/20/2022       Patient Name: Klaudia Wade      MRN: [id number]      YOB: 1950 (73 y.o.)       Gender: female  Diagnosis: personal history of COVID 23  Referring Practitioner: Dr. Sherren Meigs    RESTRICTIONS/PRECAUTIONS:  Restrictions/Precautions: General Precautions,Fall Risk  Vision: Within Functional Limits  Hearing: Within functional limits    PAIN: 0-no c/o pain     SUBJECTIVE:  I am tired    OBJECTIVE:  Pt waiting for  to visit this afternoon-states she is tired and has been working hard in therapy so she can go home next week-affect bright and pleasant-showed me pictures on her phone of crafts she has made in the past and are beautiful-supportive contact given          Patient Education  New Education Provided: Importance of Leisure,     Electronically signed by: LIBBY Arora  Date: 1/20/2022

## 2022-01-20 NOTE — PROGRESS NOTES
[address] [address]  [address] [facility]  Occupational Therapy  Daily Note  Time:   Time In: 1100  Time Out: 1130  Timed Code Treatment Minutes: 30 Minutes  Minutes: 30          Date: 2022  Patient Name: Juana Mallory,   Gender: female      Room: [facility]/068-A  MRN: [id number]  : 1950  (73 y.o.)  Referring Practitioner: Dr. Kevin Fabian  Diagnosis: personal history of COVID 19  Additional Pertinent Hx: Orlando Guidry is a 73-year-old white female who presented to [facility] on 2021 with complaints of shortness of breath she has a past medical history lifetime non-smoker, CAD with stent placement in , hypertension, diabetes mellitus, dyslipidemia. Per report she presented to [facility] on  with complaints of hypoxia and shortness of breath. She was initially placed on 4 L nasal cannula.  patient had persistent hypoxia and was transitioned to high flow nasal cannula. On  patient was transitioned to BiPAP and transferred to [facility] ICU for intubation. Pt extubated . Restrictions/Precautions:  Restrictions/Precautions: General Precautions,Fall Risk  Position Activity Restriction  Other position/activity restrictions: out of COVID isolation 22, monitor BP      SUBJECTIVE: Pt seated in w/c upon arrival, agreeable to OT session and requesting to complete laundry this date. PAIN: 0/10:     Vitals: Vitals not assessed per clinical judgement, see nursing flowsheet    COGNITION: Tangential    ADL:   No ADL's completed this session. Chance Logan BALANCE:  Sitting Balance:  Supervision. Standing Balance: Contact Guard Assistance. BED MOBILITY:  Not Tested    TRANSFERS:  Sit to Stand:  [employer], with increased time for completion, cues for hand placement. Stand to Sit: [employer], with increased time for completion, cues for hand placement.       FUNCTIONAL MOBILITY:  Assistive Device: Rolling Walker  Assist Level:  Contact Guard Assistance. Distance: short distance in apartment  Minimal cues for safety/posture     ADDITIONAL ACTIVITIES:  Patient completed IADL homemaking task this date of placing clothes in washer - task was graded to challenge standing balance/tolerance, problem solving, and overall safety. Patient required minimal assistance to retrieve clothing utilizing reacher, decreased  strength/UB strength noted thus requiring assistance to steady bag while moving to washer. Pt required assist to retrieve laundry detergent from overhead cabinet due to decreased shoulder flexion, although once OTR retrieved detergent, pt was able to open and dump in washer without difficulty. Patient required CGA throughout task with a standing endurance of ~3 minutes. Patient required minimal VCs for safety/problem solving and upright posture in standing. Discussed continued use of reacher for retrieving items outside reach (floor or higher levels) and use of walker bag for increased ease/safety with transporting items. Pt completed BUE minimal resistance theraband exercises while seated in chair. Pt completed 1 set X 10 repetitions in all planes with short rest breaks in between variations. Shoulder Exercises downgraded to AROM without theraband due to decreased ROM/quality of movement achieved when utilizing band. Exercises completed to increase overall strength/endurance needed for ADLs and transfers. ASSESSMENT:     Activity Tolerance:  Patient tolerance of  treatment: good. Discharge Recommendations: Continue to assess pending progress  Equipment Recommendations: Equipment Needed: Yes  Mobility Devices: Walker  Other: Recommend LHAE kit and shower chair.   Plan: Times per week: 5x week/90 min, 1x week/30 min  Current Treatment Recommendations: Strengthening,Balance Training,Functional Mobility Training,Endurance Training,Home Management Training,Patient/Caregiver Education & Training,Self-Care / ADL,Safety Education & Training    Patient Education  Patient Education: Plan of Care, IADL's, Home Exercise Program and Equipment Education    Goals  Short term goals  Time Frame for Short term goals: 1 week  Short term goal 1: Pt to increase standing endurance to tolerate > 5 min with bilateral UE support and CGA in prep for hand release to complete clothign management  Short term goal 2: Pt to complete LB ADL tasks with use LHAE as needed with min A  Short term goal 3: Pt will complete BUE AROM greater than 90 degrees with no > than Min A and min VC to increase shoulder flexion to reach outside base of support for needed object to complete ADL tasks  Short term goal 4: Pt to complete stand pivot transfers with min A consistently and no no cues for safety to increase indep with completing toilet transfers  Short term goal 5: Pt to participate in further assessment of mobility with OTR as her strength and endurance increases to increase indep with ADL tasks  Long term goals  Time Frame for Long term goals : 3 week  Long term goal 1: Pt to complete BADL routine with SBA and no cues for safety  Long term goal 2: Pt to complete sit to stand transfers from various surfaces including toilet with SBA and no ceus for safety  Long term goal 3: Pt to demo standing balance with unilateral UE handn release with SBA to complete clothign management after dressign and toielting    Following session, patient left in safe position with all fall risk precautions in place.

## 2022-01-20 NOTE — PROGRESS NOTES
Physical Medicine & Rehabilitation Progress Note    Chief Complaint:  Fatigue and tired    Subjective:    Evita Choe is a 73 y.o. right-handed obese  female with history of hypertension, diabetes mellitus type 2, coronary artery disease requiring coronary artery stenting, status post bilateral eyes cataract surgeries, status post 3  sections, status post hysterectomy, status post hiatal hernia repair, status post sinus surgery, was admitted on 2022 for intensive inpatient management of impairment & disability secondary to critical illness myopathy and disability/physical decondition as result of COVID-19 pneumonia. The patient says he does not quite remember how she ended up in the hospital.     The patient apparently began experiencing reduced upper size, change in taste and smell, shortness of breath, nausea, dry heaves, and diarrhea starting in 2021. The symptom progressively worsened. She was advised by her PCP to visit ER. Therefore she went to [facility] ER on 2021. She was found to be hypoxic with O2 saturation in 70s% in room air. COVID test was performed which turned out to be positive. She was advised to be admitted but she left against medical advice because she preferred to be admitted to [facility] instead. On 2021 she came to [facility] ER with complaint of shortness of breath. She was found to have oxygen saturation in the 80s% in room air. She was admitted with diagnosis of acute hypoxic respiratory failure secondary to COVID-19 pneumonia. She was treated with Decadron and Olumiant (baricitnib). However her condition deteriorated despite of treatment. She was intubated on 2021 and was admitted to ICU. Her hospital course also was complicated by superimposed Klebsiella oxytocin bacterial pneumonia, bradycardia requiring dopamine infusion, and decubitus ulcer. Her condition later stabilized and improved. She was extubated on 1/2/2022. The patient says she feels well. She had a good sleep last night. She says her weakness is getting better. Her endurance also is improving. She offers no new complaint. She denies having any pain this morning. She only took Tylenol once yesterday. She tolerates the intensive inpatient rehabilitation treatment well. She is projected to be ready for discharge on 1/28/2022. Rehabilitation:  PT: Reviewed. Transfers:  Sit to Stand: Contact Guard Assistance  Stand to [place] [id number]  -Patient required verbal cueing for anterior weight shifting and hand placement with verbal cueing for eccentric lowering control     Ambulation:  Contact Guard Assistance  Distance: 62 feet and 65 feet  Surface: Level Tile  Device:Rolling Walker  Gait Deviations: Forward Flexed Posture, Decreased Gait Speed and Decreased Heel Strike Bilaterally    -Patient instructed in weaving in and out of cones with RW and CGA. Patient required verbal cueing for increased step and stride length and for maintaining body inside AD with turning. Patient instructed in weaving in and out of cones in order to assist with obstacle negotiation.      Stairs:  Contact Guard Assistance, with increased time for completion  Number of Steps: 1   Height: 4\" step with Rolling Walker   -Patient instructed in forward step ups onto 4 inch platform step with verbal cueing for placement of walker. Patient able to perform x 4 reps and x 5 reps intermittently. Patient became fatigued quickly with step ups. Patient instructed in forward step ups in order to assist with BLE strengthening, endurance and stair training. Balance:  Pt. Completed standing dynamic balance activity: balloon volleyball with Normal DOMENICO on level tile and Single UE support on Rolling Walker with Stand By Assistance. Activity completed to improve balance, enhance functional mobility, and reduce risk of falls.  Pt. Requiring seated rest breaks throughout secondary to fatigue. Pt. Completed standing dynamic balance activity: transferring clothes from washer to dryer on level tile with Single UE support on washer with Contact Guard Assistance. Activity completed to improve balance, enhance functional mobility, and reduce risk of falls. Pt. Easily fatigues and requires cues for breathing technique.        OT: Reviewed. ADL:   Toileting: Contact Guard Assistance, Max A with posterior hygiene. Toilet Transfer: Contact Guard Assistance.       IADL:   Patient given homemaking scenario of picking up dishes and trash on floor. Pt completed IADL homemaking task this date of gathering dishes from tabletop, wiping countertops, and picking up napkins from floor. Task was graded to challenge walker safety and comprehension of ECT and oxygen readings. Pt given pulse ox  Patient was able to retrieve all items from tabletop and floor and CGA and min vcs for walker safety. VCs for safety during the retrieval and transportation of items. Patient required CGA throughout task with a standing endurance of 5 minutes. Education on ECT concepts provided with pt returning demo    BALANCE:  Sitting Balance:  Supervision. Standing Balance: Contact Guard Assistance, with cues for safety. tolerates in 1 min increments     TRANSFERS:  Sit to Stand:  Contact Guard Assistance. Stand to Sit: Contact Guard Assistance.       FUNCTIONAL MOBILITY:  Assistive Device: Rolling Walker  Assist Level:  Contact Guard Assistance. Distance: within kitchen   Minimal cues for safety/posture     Assistive Device: Rolling Walker  Assist Level:  Contact Guard Assistance. Distance: short distance in apartment  Minimal cues for safety/posture     Assistive Device: Rolling Walker  Assist Level:  Contact Guard Assistance. Distance:  To and from bathroom  And in kitchen area, no LOB noted       ADDITIONAL ACTIVITIES:  Patient completed IADL homemaking task this date of placing clothes in washer - task was graded to challenge standing balance/tolerance, problem solving, and overall safety. Patient required minimal assistance to retrieve clothing utilizing reacher, decreased  strength/UB strength noted thus requiring assistance to steady bag while moving to washer. Pt required assist to retrieve laundry detergent from overhead cabinet due to decreased shoulder flexion, although once OTR retrieved detergent, pt was able to open and dump in washer without difficulty. Patient required CGA throughout task with a standing endurance of ~3 minutes. Patient required minimal VCs for safety/problem solving and upright posture in standing. Discussed continued use of reacher for retrieving items outside reach (floor or higher levels) and use of walker bag for increased ease/safety with transporting items.      Pt completed BUE minimal resistance theraband exercises while seated in chair. Pt completed 1 set X 10 repetitions in all planes with short rest breaks in between variations. Shoulder Exercises downgraded to AROM without theraband due to decreased ROM/quality of movement achieved when utilizing band. Exercises completed to increase overall strength/endurance needed for ADLs and transfers. Pt. Stood to fold laundry incorporating unilateral and bilateral reaching with CGA. Pt. With moderate fatigue required seated rest break after standing x 2 mins. Pt. Instructed on activity in kitchen incorp. Access/retrieval into various heights while retrieving with BUE to challenge standing tolerance/balance, and overall activity tolerance. Activity completed to increase overall strength needed for ADL and transfers. ST: Reviewed.     Deductive reasoning puzzle - indep x4, min cues x2; discontinued due to time constraints, though good success to begin   *recommended finishing remainder of puzzle as HEP; patient receptive and verbalized understanding     Complex math related to prices on menu - 5/5   *adequate use of self talk throughout to assist with mental math   *excellent sustained attention to task   *task duration - 20 minutes; slightly prolonged, however functional, given overall success       Review of Systems:  Review of Systems   Constitutional: Positive for fatigue. Negative for chills, diaphoresis and fever. HENT: Negative for hearing loss, rhinorrhea, sneezing, sore throat and trouble swallowing. Eyes: Negative for visual disturbance. Respiratory: Negative for cough, shortness of breath and wheezing. Cardiovascular: Negative for chest pain and palpitations. Gastrointestinal: Negative for abdominal pain, constipation, diarrhea, nausea and vomiting. Genitourinary: Negative for dysuria. Musculoskeletal: Positive for gait problem. Negative for arthralgias, back pain, myalgias and neck pain. Skin: Negative for rash. Neurological: Positive for weakness (Generalized). Negative for dizziness, tremors, light-headedness, numbness and headaches. Psychiatric/Behavioral: Negative for dysphoric mood, hallucinations and sleep disturbance. The patient is not nervous/anxious.          Objective:  /73   Pulse 83   Temp 98.5 °F (36.9 °C) (Oral)   Resp 18   Ht 5' 2\" (1.575 m)   Wt 202 lb (91.6 kg)   LMP  (LMP Unknown)   SpO2 94%   Breastfeeding No   BMI 36.95 kg/m²   Physical Exam   General:  well-developed, well nourished obese  female ; in no acute distress ; appropriate affect & mood; sitting on wheelchair comfortably  Eyes: pupil equally round ; extra-ocular motion intact bilaterally  Head, Ear, Nose, Mouth & Throat : normocephalic ; no discharge from ears or nose ; no deformity ; no facial swelling ; oral mucosa pink   Neck :  supple ; no tenderness ; no muscle spasm  Cardiovascular : regular rate & rhythm ; normal S1 & S2 heart sound ; no murmur ; normal peripheral pulse at bilateral upper & lower extremities  Pulmonary : Breath sounds present at bilateral lung secondary to RTATP-95 pneumonia, complicated by bacterial pneumonia, bradycardia, and decubitus ulcer  · History of COVID-19 pneumonia with acute hypoxic respiratory failure requiring intubation, complicated by Klebsiella oxytocin bacterial pneumonia  · Mild dysphagia/moderate pharyngeal dysphagia  · Mild cognitive impairment  · Hypertension  · Diabetes mellitus type 2  · History of coronary artery disease requiring coronary artery stenting  · Severe cardiomyopathy with reduced ejection fraction  · Obesity  · Right buttock pain likely due to gluteus medius muscle strain  · Bilateral leg swelling, to rule out deep vein thrombosis    The patient's condition remains stable dose. Her weakness is much improved. She still has weakness mainly at bilateral hips. She still has poor endurance but is also improving slowly. She continues tolerating the intensive inpatient rehabilitation treatment well. Her function continues to improve slowly and steadily. The patient currently is projected to be ready for discharge on 1/28/2022. Plan:  · Continue intensive PT/OT/SLP/RT inpatient rehabilitation program at least 3 hours per day, 5 days per week in order to improve functional status prior to discharge. Family education and training will be completed. Equipment evaluations and recommendations will be completed as appropriate. · Rehabilitation nursing continue to be involved for bowel, bladder, skin, and pain management. Nursing will also provide education and training to patient and family. · Prophylaxis:  DVT: Lovenox, KENA stocking, intermittent pneumatic compression device. GI: Colace, FiberCon, Dulcolax suppository as needed, milk of magnesium as needed, GlycoLax as needed, Senokot as needed.   · Pain: Tylenol as needed; Voltaren gel as needed; heat pad application as needed  · Continue aspirin, Crestor for coronary artery disease  · Continue Pepcid for gastric protection  · Continue Lasix for hypertension  · Continue Crestor for hyperlipidemia  · Continue glipizide for diabetes mellitus  · Continue albuterol inhaler as needed for COVID-19 pneumonia  · Melatonin as needed for insomnia  · Nutrition:   Continue current diet  · Bladder: Monitoring signs or symptoms of UTI  · Bowel: Monitoring signs or symptoms of constipation  ·  and case management for coordination of care and discharge planning      Missed Therapy Time:  · None      Estephania Peng MD

## 2022-01-20 NOTE — PROGRESS NOTES
[address] [facility] THERAPY  [address] [facility]  DAILY NOTE    TIME   SLP Individual Minutes  Time In: 0800  Time Out: 0830  Minutes: 30  Timed Code Treatment Minutes: 30 Minutes      Date: 2022  Patient Name: Hodan Mendoza      CSN: [id number]   : 1950  (73 y.o.)  Gender: female   Referring Physician:  Lou Phan MD  Diagnosis: Personal history of COVID-19  Secondary Diagnosis: Dysphagia, Cognitive Impairment  Precautions: Aspiration Risk, Fall Risk  Current Diet: Regular with Thin - advanced 2022  Swallowing Strategies: Full Upright Position, Small Bite/Sip, Pulmonary Monitoring, Alternate Solids and Liquids, Limit Distractions and Monitor for Fatigue           Date of Last MBS/FEES: MBS 2022    Pain:  No pain reported. Subjective:  Patient sitting upright in wheelchair for duration of session. Alert and pleasantly engaged; remains highly motivated. Patient reports feeling cognitive function has improved but admits to some remaining weaknesses (I.e., not yet at baseline). Short-Term Goals:  SHORT TERM GOAL #1:  Goal 1: Patient will consume a regular texture diet with thin liquids while implementing recommended swallow strategies with no overt s/s of aspiration and adequate endurance to assist with meeting nutrition/hydration needs. INTERVENTIONS: Did not address this session d/t focus on other goals. SHORT TERM GOAL #2:  Goal 2: Patient will consume advanced PO trials with ST while demonstrating timely/coordinated mastication, complete bolus clearance, no overt s/s of aspiration, and adeqaute endurance for potential diet advancement to least restrictive diet   INTERVENTIONS: Did not address this session d/t focus on other goals.     SHORT TERM GOAL #3:  Goal 3: Patient will complete pharyngeal strengthening exercises x10-15 (effortful swallow, chris) to improve overall timeliness of swallow, airway protection, and decrease pharyngeal residue. INTERVENTIONS: Did not address this session d/t focus on other goals. SHORT TERM GOAL #4:  Goal 4: Patient will complete orientation and immediate/delayed/working memory tasks with 85% accuracy given min cuing in order to improve retention of novel information in current environment. INTERVENTIONS: Did not address this session d/t focus on other goals. SHORT TERM GOAL #5:  Goal 5: Patient will complete verbal/visual reasoning and safety problem solving tasks with 85% accuracy given min cuing in order to improve independent completion of IADLs. INTERVENTIONS: Deductive reasoning puzzle - indep x4, min cues x2; discontinued due to time constraints, though good success to begin   *recommended finishing remainder of puzzle as HEP; patient receptive and verbalized understanding     SHORT TERM GOAL #6:  Goal 6: Patient will complete executive functioning tasks (i.e., medications, finances, scheduling) with 85% accuracy given min cuing in order to improve independent completion of IADLs with support of family. INTERVENTIONS: Complex math related to prices on menu - 5/5   *adequate use of self talk throughout to assist with mental math   *excellent sustained attention to task   *task duration - 20 minutes; slightly prolonged, however functional, given overall success     Long-Term Goals:  Timeframe for Long-term Goals: 6 weeks    LONG TERM GOAL #1:  Goal 1: Patient will safely consume regular diet with thin liquids with implementation of compensatory swallowing strategies and withOUT overt s/s of aspiration in order to maintain adequate nutrition and hydration measures. LONG TERM GOAL #2:  Goal 2: Patient will improve cognitive functioning to at least a modified independent level in order to improve safety of IADL completion upon potential discharge to home environment.       Comprehension: 6 - Complex ideas 90% or device (hearing aid or glasses- if patient is primarily a visual learner)  Expression: 6 - Device used to express complex ideas/needs  Social Interaction: 6 - Patient requires medication for mood and/or effect  Problem Solvin - Patient able to solve simple/routine tasks  Memory: 4 - Patient remembers 75-90%+ of the time    EDUCATION:  Learner: Patient  Education:  Reviewed ST goals and Plan of Care and Reviewed recommendations for follow-up  Evaluation of Education: Verbalizes understanding    ASSESSMENT/PLAN:  Activity Tolerance:  Patient tolerance of treatment: good. Assessment/Plan: Patient progressing toward established goals. Continues to require skilled care of licensed speech pathologist to progress toward achievement of established goals and plan of care.     Plan for Next Session: Pharyngeal Strengthening Exercises, finish deductive reasoning task, hospital navigation        [place], [place]. [id number] [facility] ([place] [id number]

## 2022-01-20 NOTE — PROGRESS NOTES
Alert and oriented to person, place, time. Calm, appropriate interaction with staff. ERLIN 3mm to 2mm, brisk. Mucous membranes pink, moist. Hair shiny. Skin dry, warm. Smile symmetrical. Tongue midline. Speech clear. Heart sounds strong, regular. Respirations easy, regular. Lung sounds clear anterior, posterior, lateral. No cough noted. Abdominal sounds present in all 4 quadrants. Abdomen round, firm, non tender to palpation. States last bowel movement was three days ago. States no difficulty urinating. States no difficulty passing gas. Arm drift negative bilaterally. Hand grasp strong bilaterally. Skin turgor sluggish. Capillary refill less than 3 seconds. Radial pulses strong and equal. Leg lift strong bilaterally. Pedal push and pull strong bilaterally. Jeanne's sign negative bilaterally. Pedal pulses strong and equal. No edema noted. States pain a 0 out of 10. Denies needs at present time. In wheelchair with call light and bedside table within reach.   Dominguez Andersen PNS

## 2022-01-20 NOTE — PROGRESS NOTES
[address]  Occupational Therapy  Daily Note  Time:   Time In: 1400  Time Out: 1430  Timed Code Treatment Minutes: 30 Minutes  Minutes: 30          Date: 2022  Patient Name: Jono Bosch,   Gender: female      Room: [facility]68/068-A  MRN: [id number]  : 1950  (73 y.o.)  Referring Practitioner: Dr. Karen Holbrook  Diagnosis: personal history of COVID 19  Additional Pertinent Hx: Eladia Escobar is a 68-year-old white female who presented to [facility] on 2021 with complaints of shortness of breath she has a past medical history lifetime non-smoker, CAD with stent placement in , hypertension, diabetes mellitus, dyslipidemia. Per report she presented to [facility] on  with complaints of hypoxia and shortness of breath. She was initially placed on 4 L nasal cannula.  patient had persistent hypoxia and was transitioned to high flow nasal cannula. On  patient was transitioned to BiPAP and transferred to [facility] ICU for intubation. Pt extubated . Restrictions/Precautions:  Restrictions/Precautions: General Precautions,Fall Risk  Position Activity Restriction  Other position/activity restrictions: out of COVID isolation 22, monitor BP     SUBJECTIVE: Pt. Pleasant and cooperative agreeable to OT session. PAIN: Denies    Vitals: Vitals not assessed per clinical judgement, see nursing flowsheet  Oxygen: RA 94% decreased to 84% with standing component, Pt. Recovered quickly with seated rest break and deep breathing technique. COGNITION: Tangential     ADL:   Toileting: Contact Guard Assistance, Max A with posterior hygiene. Toilet Transfer: Contact Guard Assistance. BALANCE:  Standing Balance: Contact Guard Assistance, with cues for safety. tolerates in 1 min increments    BED MOBILITY:  Not Tested    TRANSFERS:  Sit to Stand:  Contact Guard Assistance.     Stand to Sit: Marcellus Donald Assistance. FUNCTIONAL MOBILITY:  Assistive Device: Rolling Walker  Assist Level:  Contact Guard Assistance. Distance: To and from bathroom  And in kitchen area, no LOB noted      ADDITIONAL ACTIVITIES:  Pt. Stood to fold laundry incorporating unilateral and bilateral reaching with CGA. Pt. With moderate fatigue required seated rest break after standing x 2 mins. Pt. Instructed on activity in kitchen incorp. Access/retrieval into various heights while retrieving with BUE to challenge standing tolerance/balance, and overall activity tolerance. Activity completed to increase overall strength needed for ADL and transfers. ASSESSMENT:     Activity Tolerance:  Patient tolerance of  treatment: fair. Discharge Recommendations: Continue to assess pending progress  Equipment Recommendations: Equipment Needed: Yes  Mobility Devices: Walker  Other: Recommend LHAE kit and shower chair.   Plan: Times per week: 5x week/90 min, 1x week/30 min  Current Treatment Recommendations: Strengthening,Balance Training,Functional Mobility Training,Endurance Training,Home Management Training,Patient/Caregiver Education & Training,Self-Care / ADL,Safety Education & Training    Patient Education  Patient Education: ADL's, Importance of Increasing Activity and Assistive Device Safety    Goals  Short term goals  Time Frame for Short term goals: 1 week  Short term goal 1: Pt to increase standing endurance to tolerate > 5 min with bilateral UE support and CGA in prep for hand release to complete clothign management  Short term goal 2: Pt to complete LB ADL tasks with use LHAE as needed with min A  Short term goal 3: Pt will complete BUE AROM greater than 90 degrees with no > than Min A and min VC to increase shoulder flexion to reach outside base of support for needed object to complete ADL tasks  Short term goal 4: Pt to complete stand pivot transfers with min A consistently and no no cues for safety to increase indep with completing toilet transfers  Short term goal 5: Pt to participate in further assessment of mobility with OTR as her strength and endurance increases to increase indep with ADL tasks  Long term goals  Time Frame for Long term goals : 3 week  Long term goal 1: Pt to complete BADL routine with SBA and no cues for safety  Long term goal 2: Pt to complete sit to stand transfers from various surfaces including toilet with SBA and no ceus for safety  Long term goal 3: Pt to demo standing balance with unilateral UE handn release with SBA to complete clothign management after dressign and toielting    Following session, patient left in safe position with all fall risk precautions in place.

## 2022-01-20 NOTE — CARE COORDINATION
Jono Bosch was evaluated today and a DME order was entered for a wheeled walker because she requires this to successfully complete daily living tasks of toileting, personal cares, ambulating, hygiene, dressing upper body, dressing lower body and meal preparation. A wheeled walker is necessary due to the patient's unsteady gait, upper body weakness, and inability to  an ambulation device; and she can ambulate only by pushing a walker instead of a lesser assistive device such as a cane, crutch, or standard walker. The need for this equipment was discussed with the patient and she understands and is in agreement.     Sasha Kemp PT, DPT

## 2022-01-21 LAB — GLUCOSE BLD-MCNC: 196 MG/DL (ref 70–108)

## 2022-01-21 PROCEDURE — 97530 THERAPEUTIC ACTIVITIES: CPT

## 2022-01-21 PROCEDURE — 97110 THERAPEUTIC EXERCISES: CPT

## 2022-01-21 PROCEDURE — 97116 GAIT TRAINING THERAPY: CPT

## 2022-01-21 PROCEDURE — 6370000000 HC RX 637 (ALT 250 FOR IP): Performed by: PHYSICAL MEDICINE & REHABILITATION

## 2022-01-21 PROCEDURE — 82948 REAGENT STRIP/BLOOD GLUCOSE: CPT

## 2022-01-21 PROCEDURE — 97130 THER IVNTJ EA ADDL 15 MIN: CPT

## 2022-01-21 PROCEDURE — 97535 SELF CARE MNGMENT TRAINING: CPT

## 2022-01-21 PROCEDURE — 1180000000 HC REHAB R&B

## 2022-01-21 PROCEDURE — 6360000002 HC RX W HCPCS: Performed by: PHYSICAL MEDICINE & REHABILITATION

## 2022-01-21 PROCEDURE — 97129 THER IVNTJ 1ST 15 MIN: CPT

## 2022-01-21 PROCEDURE — 99232 SBSQ HOSP IP/OBS MODERATE 35: CPT | Performed by: PHYSICAL MEDICINE & REHABILITATION

## 2022-01-21 RX ADMIN — GLIPIZIDE 5 MG: 5 TABLET ORAL at 08:15

## 2022-01-21 RX ADMIN — ASPIRIN 81 MG CHEWABLE TABLET 81 MG: 81 TABLET CHEWABLE at 08:25

## 2022-01-21 RX ADMIN — ACETAMINOPHEN 650 MG: 325 TABLET ORAL at 20:31

## 2022-01-21 RX ADMIN — DOCUSATE SODIUM 100 MG: 100 CAPSULE, LIQUID FILLED ORAL at 08:25

## 2022-01-21 RX ADMIN — FAMOTIDINE 20 MG: 20 TABLET, FILM COATED ORAL at 20:31

## 2022-01-21 RX ADMIN — GLIPIZIDE 5 MG: 5 TABLET ORAL at 17:20

## 2022-01-21 RX ADMIN — FAMOTIDINE 20 MG: 20 TABLET, FILM COATED ORAL at 08:25

## 2022-01-21 RX ADMIN — DOCUSATE SODIUM 100 MG: 100 CAPSULE, LIQUID FILLED ORAL at 20:31

## 2022-01-21 RX ADMIN — ROSUVASTATIN 10 MG: 10 TABLET, FILM COATED ORAL at 20:31

## 2022-01-21 RX ADMIN — CALCIUM POLYCARBOPHIL 625 MG: 625 TABLET, FILM COATED ORAL at 08:24

## 2022-01-21 RX ADMIN — MICONAZOLE NITRATE: 2 POWDER TOPICAL at 08:25

## 2022-01-21 RX ADMIN — FUROSEMIDE 40 MG: 40 TABLET ORAL at 08:24

## 2022-01-21 RX ADMIN — MICONAZOLE NITRATE: 2 POWDER TOPICAL at 20:36

## 2022-01-21 RX ADMIN — ENOXAPARIN SODIUM 30 MG: 100 INJECTION SUBCUTANEOUS at 08:25

## 2022-01-21 RX ADMIN — FLUCONAZOLE 200 MG: 200 TABLET ORAL at 08:26

## 2022-01-21 RX ADMIN — ACETAMINOPHEN 650 MG: 325 TABLET ORAL at 08:24

## 2022-01-21 RX ADMIN — ENOXAPARIN SODIUM 30 MG: 100 INJECTION SUBCUTANEOUS at 20:31

## 2022-01-21 RX ADMIN — Medication 6 MG: at 20:31

## 2022-01-21 ASSESSMENT — PAIN DESCRIPTION - PAIN TYPE: TYPE: ACUTE PAIN

## 2022-01-21 ASSESSMENT — PAIN SCALES - GENERAL
PAINLEVEL_OUTOF10: 0
PAINLEVEL_OUTOF10: 6
PAINLEVEL_OUTOF10: 1
PAINLEVEL_OUTOF10: 3
PAINLEVEL_OUTOF10: 3

## 2022-01-21 ASSESSMENT — PAIN DESCRIPTION - LOCATION: LOCATION: HIP

## 2022-01-21 ASSESSMENT — PAIN DESCRIPTION - ORIENTATION: ORIENTATION: RIGHT;LEFT

## 2022-01-21 NOTE — PROGRESS NOTES
[address] [facility] 49  254 [facility]  Occupational Therapy  Daily Note  Time:   Time In: 1330  Time Out: 1430  Timed Code Treatment Minutes: 60 Minutes  Minutes: 60          Date: 2022  Patient Name: Juana Mallory,   Gender: female      Room: [facility]/068-A  MRN: [id number]  : 1950  (73 y.o.)  Referring Practitioner: Dr. Kevin Fabian  Diagnosis: personal history of COVID 19  Additional Pertinent Hx: Orlando Guidry is a 71-year-old white female who presented to [facility] on 2021 with complaints of shortness of breath she has a past medical history lifetime non-smoker, CAD with stent placement in , hypertension, diabetes mellitus, dyslipidemia. Per report she presented to [facility] on  with complaints of hypoxia and shortness of breath. She was initially placed on 4 L nasal cannula.  patient had persistent hypoxia and was transitioned to high flow nasal cannula. On  patient was transitioned to BiPAP and transferred to [facility] ICU for intubation. Pt extubated . Restrictions/Precautions:  Restrictions/Precautions: General Precautions,Fall Risk  Position Activity Restriction  Other position/activity restrictions: out of COVID isolation 22, monitor BP     SUBJECTIVE: Pt seated in w/c upon arrival, agreeable to OT session. PAIN: No c/o. Vitals: Oxygen: Patient on O2 via Room Air  upon arrival to room. Patient O2 sats at >90%. Upon activity patient dropping O2 at mid 80s. Pt requiring mod rest break(s) to recover. Heart Rate: 90-100s    COGNITION: Decreased Problem Solving, Decreased Safety Awareness and Tangential    ADL:   Toileting: Stand By Assistance. Toilet Transfer: Stand By Assistance. Comment: Pt is adamant on using w/c within BR d/t fear of falling after near miss with nursing while ambulating a few days ago.  Educated on progression of therapy and needs to challenge self to ambulate at household distances. BALANCE:  Sitting Balance:  Supervision. Standing Balance: Stand By Assistance, [employer]. x1-2 serveral trials within IADL task d/t fatigue. BED MOBILITY:  Not Tested    TRANSFERS:  Sit to Stand:  Contact Guard Assistance, Minimal Assistance. Rocco from low chair  Stand to Sit: Stand By Assistance, Contact Guard Assistance    FUNCTIONAL MOBILITY:  Assistive Device: Rolling Walker   Assist Level:  Stand By Assistance and [employer]. Distance:   Completed functional mobility within therapy kitchen at sloe pace, no LOB noted. Pt requires mod-min cues for walker safety within IADL task- legthy seated rest break after each trial of mobility, mod fatigue noted. ADDITIONAL ACTIVITIES:  Pt participated in IADL task to ambulate within therapy kitchen with use of RW to reach into various planes at high/low levels to put away groceries. Pt required CGA-SBA for balance and mod cues for safe technique transporting items or energy conservation strategies using reacher as needed while reaching into cupboards. Completed to increase kitchen safety and facilitate functional reaching required for simple meal prep tasks. Pt participated in dynamic standing activity to challenge balance and facilitate functional reach into various planes for rings and toss onto target with BUE and 1 UE release from RW. Pt stood for multiple trials flucuating tolerance x30-1 minute with SBA before requiring seated rest break, mod fatigue noted. Completed to increase safety with dynamic standing required for showering tasks. ASSESSMENT:     Activity Tolerance:  Patient tolerance of  treatment: fair. Discharge Recommendations: Continue to assess pending progress  Equipment Recommendations: Equipment Needed: Yes  Mobility Devices: Walker  Other: Recommend LHAE kit and shower chair.   Plan: Times per week: 5x week/90 min, 1x week/30 min  Current Treatment Recommendations: Strengthening,Balance Training,Functional Mobility Training,Endurance Training,Home Management Training,Patient/Caregiver Education & Training,Self-Care / ADL,Safety Education & Training    Patient Education  Patient Education: Role of OT, Plan of Care, ADL's, IADL's, Energy Conservation, Importance of Increasing Activity, Assistive Device Safety and Safety with transfers and mobility. Goals  Short term goals  Time Frame for Short term goals: 1 week  Short term goal 1: Pt to increase standing endurance to tolerate > 5 min with bilateral UE support and CGA in prep for hand release to complete clothign management  Short term goal 2: Pt to complete LB ADL tasks with use LHAE as needed with min A  Short term goal 3: Pt will complete BUE AROM greater than 90 degrees with no > than Min A and min VC to increase shoulder flexion to reach outside base of support for needed object to complete ADL tasks  Short term goal 4: Pt to complete stand pivot transfers with min A consistently and no no cues for safety to increase indep with completing toilet transfers  Short term goal 5: Pt to participate in further assessment of mobility with OTR as her strength and endurance increases to increase indep with ADL tasks  Long term goals  Time Frame for Long term goals : 3 week  Long term goal 1: Pt to complete BADL routine with SBA and no cues for safety  Long term goal 2: Pt to complete sit to stand transfers from various surfaces including toilet with SBA and no ceus for safety  Long term goal 3: Pt to demo standing balance with unilateral UE handn release with SBA to complete clothign management after dressign and toielting    Following session, patient left in safe position with all fall risk precautions in place.

## 2022-01-21 NOTE — PROGRESS NOTES
[address] [place] [address]  INPATIENT PHYSICAL THERAPY  Daily Note  [facility]    Time In: 1100  Time Out: 1130  Timed Code Treatment Minutes: 30 Minutes  Minutes: 30          Date: 2022  Patient Name: Chau Quach,  Gender:  female        MRN: [id number]  : 1950  (73 y.o.)     Referring Practitioner: Edith Lambert MD  Diagnosis: Personal history of COVID-19  Additional Pertinent Hx: Per Acute notes, \"Kaleigh Reilly is a 65-year-old white female who presented to [facility] on 2021 with complaints of shortness of breath she has a past medical history lifetime non-smoker, CAD with stent placement in , hypertension, diabetes mellitus, dyslipidemia. Per report she presented to [facility] on  with complaints of hypoxia and shortness of breath. She was initially placed on 4 L nasal cannula.  patient had persistent hypoxia and was transitioned to high flow nasal cannula. On  patient was transitioned to BiPAP and transferred to [address] [place] [address] ICU for intubation. Pt extubated . \"     Prior Level of Function:  Lives With: Spouse  Type of Home: House  Home Layout: One level  Home Access: Stairs to enter without rails  Entrance Stairs - Number of Steps: 1 step (very small)  Home Equipment:  (none)   Bathroom Shower/Tub: Walk-in shower (+ jaquazi)  Bathroom Toilet: Handicap height  Bathroom Equipment: Built-in shower seat (pt stating she has a bench right outside shower as well to sit on dry off)  Bathroom Accessibility: Accessible    ADL Assistance: Independent  Homemaking Assistance: Independent  Ambulation Assistance: Independent  Transfer Assistance: Independent  Active : Yes  IADL Comments: 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  Pt stating her children all live close and would be willing to help. Spouse in good health as well to assist.    Restrictions/Precautions:  Restrictions/Precautions: General Precautions,Fall Risk  Position Activity Restriction  Other position/activity restrictions: out of COVID isolation 1/6/22, monitor BP     SUBJECTIVE: Pt. Seated in WC and pleasantly agrees to therapy session. Spouse present and attending session. Pt. Motivated. PAIN: None indicated    Vitals: Oxygen: 86% with activity, 93% at rest asymptomatic. OBJECTIVE:  Bed Mobility:  Not Tested    Transfers:  Sit to Stand: Contact Guard Assistance  Stand to Sit:Contact Guard Assistance    Ambulation:  Contact Guard Assistance  Distance: 70' x 1, ~40' while dribbling soccer ball to encourage increased lateral weight shifts  Surface: Level Tile  Device:Rolling Walker  Gait Deviations:  Slow Haylee, Decreased Step Length Bilaterally, Decreased Gait Speed, Decreased Heel Strike Bilaterally, Narrow Base of Support and Decreased Terminal Knee Extension    Balance:  Pt. Completed standing dynamic balance activity: ring toss while balancing tennis ball on cone with contralateral hand with Normal DOMENICO on level tile and No UE support with Contact Guard Assistance, Minimal Assistance. Activity completed to improve balance, enhance functional mobility, and reduce risk of falls. Exercise:  None    Functional Outcome Measures: Not completed       ASSESSMENT:  Assessment: Patient progressing toward established goals. Activity Tolerance:  Patient tolerance of  treatment: good. Equipment Recommendations: Other: will monitor for needs pending progress and DC destination  Discharge Recommendations: Continue to assess pending progress     Plan: Times per week: 5x/wk 90 min; 1x/wk 30 min  Current Treatment Recommendations: Strengthening,Positioning,Patient/Caregiver Education & Training,Safety Education & Training,Balance Training,Endurance Training,Functional Mobility Training,Neuromuscular Re-education,Gait Training,Transfer Training,Home Exercise Program,Equipment Evaluation, Education, & procurement    Patient Education  Patient Education: Plan of Care, Transfers, Reviewed Prior Education, Gait, Verbal Exercise Instruction, Health Promotion and Wellness Education, breathing technique    Goals:  Patient goals : to get stronger  Short term goals  Time Frame for Short term goals: 1 week  Short term goal 1: Pt to be CGA for supine <> sit to get in/out of bed  Short term goal 2: Pt to be CGA for sit <> stand to get up to ambulate/transfer  Short term goal 3: Pt to be CGA for stand-pivot with RW to get between seats  Short term goal 4: Pt to ambulate > 10 ft with RW with Min A to get to bathroom  Short term goal 5: Pt to complete car transfer with Min A for transportation needs  Long term goals  Time Frame for Long term goals : 3 weeks  Long term goal 1: Pt to be Supervision for supine <> sit to get in/out of bed  Long term goal 2: Pt to be Supervision for sit <> stand to get up to ambulate/transfer  Long term goal 3: Pt to be Supervision for stand-pivot with RW to get between seats  Long term goal 4: Pt to ambulate > 50 ft with RW with SBA for household distances  Long term goal 5: Pt to complete car transfer with Supervision for transportation needs    Following session, patient left in safe position with all fall risk precautions in place.

## 2022-01-21 NOTE — PROGRESS NOTES
Patient bathed herself with minimal assistance. Tolerated well. In wheelchair, wheels locked. Call light and bedside table within reach. Denies needs at present time.

## 2022-01-21 NOTE — PROGRESS NOTES
60[address] [facility] 49  254 [facility]  Occupational Therapy  Daily Note  Time:   Time In: 1030  Time Out: 1100  Timed Code Treatment Minutes: 30 Minutes  Minutes: 30          Date: 2022  Patient Name: Cr Maier,   Gender: female      Room: [facility]/068-A  MRN: [id number]  : 1950  (73 y.o.)  Referring Practitioner: Dr. Van Hopkins  Diagnosis: personal history of COVID 19  Additional Pertinent Hx: Daria Lynn is a 62-year-old white female who presented to [facility] on 2021 with complaints of shortness of breath she has a past medical history lifetime non-smoker, CAD with stent placement in , hypertension, diabetes mellitus, dyslipidemia. Per report she presented to [facility] on  with complaints of hypoxia and shortness of breath. She was initially placed on 4 L nasal cannula.  patient had persistent hypoxia and was transitioned to high flow nasal cannula. On  patient was transitioned to BiPAP and transferred to [facility] ICU for intubation. Pt extubated . Restrictions/Precautions:  Restrictions/Precautions: General Precautions,Fall Risk  Position Activity Restriction  Other position/activity restrictions: out of COVID isolation 22, monitor BP     SUBJECTIVE: Pt seated in w/c upon arrival, agreeable to OT session. PAIN: No c/o. Vitals: Vitals not assessed per clinical judgement, see nursing flowsheet    COGNITION: Slow Processing, Decreased Insight and Tangential    ADL:   No ADL's completed this session. Sin Case BALANCE:  Sitting Balance:  Supervision. W/c  Standing Balance: Stand By Assistance. x47 secs longest stand     BED MOBILITY:  Not Tested    TRANSFERS:  Sit to Stand:  Contact Guard Assistance. Stand to Sit: Contact Guard Assistance. Comment: Good hand placement. FUNCTIONAL MOBILITY:  Assistive Device: Rolling Walker   Assist Level:  Contact Guard Assistance.    Distance:   Completed functional mobility short distance within pt room at slow pace, no LOB noted. Pt requires min cues for walker safety- seated rest break after trial of mobility, mod fatigue noted. ADDITIONAL ACTIVITIES:  Patient identified a personal goal to increase UB strength and improve overall endurance so they can complete their toilet & shower transfers; skilled edu on UE strengthening. Completed BUE exercises x10 reps x1 sets using min resistance band in all joints/planes to increase strength and endurance required for ADLs. Pt required min rest break between each exercise and min v/c for proper technique. ASSESSMENT:   Pt progressing towards established goals. Goals revised to reflect improvements in mobility. Activity Tolerance:  Patient tolerance of  treatment: fair. Discharge Recommendations: Continue to assess pending progress  Equipment Recommendations: Equipment Needed: Yes  Mobility Devices: Walker  Other: Recommend LHAE kit and shower chair. Plan: Times per week: 5x week/90 min, 1x week/30 min  Current Treatment Recommendations: Strengthening,Balance Training,Functional Mobility Training,Endurance Training,Home Management Training,Patient/Caregiver Education & Training,Self-Care / ADL,Safety Education & Training    Patient Education  Patient Education: Energy Conservation, Home Exercise Program, Importance of Increasing Activity and Safety with transfers and mobility. Treatment session and note completed by Thony CAMARA . Goal revision of mobility goals of Daily note  completed by Phillip Bradford OT.       Goals  Short term goals  Time Frame for Short term goals: 1 week  Short term goal 1: Pt to increase standing endurance to tolerate > 5 min with bilateral UE support and CGA in prep for hand release to complete clothign management  Short term goal 2: Pt to complete LB ADL tasks with use LHAE as needed with min A  Short term goal 3: Pt will complete BUE AROM greater than 90 degrees with no > than Min A and min VC to increase shoulder flexion to reach outside base of support for needed object to complete ADL tasks  Short term goal 4: Pt to complete stand pivot transfers with min A consistently and no no cues for safety to increase indep with completing toilet transfers MET REVISE   Short term goal 5: Pt to participate in further assessment of mobility with OTR as her strength and endurance increases to increase indep with ADL tasks  MET REVISE   Long term goals  Time Frame for Long term goals : 3 week  Long term goal 1: Pt to complete BADL routine with SBA and no cues for safety  Long term goal 2: Pt to complete sit to stand transfers from various surfaces including toilet with SBA and no ceus for safety  Long term goal 3: Pt to demo standing balance with unilateral UE handn release with SBA to complete clothign management after dressign and toielting    Revised Short-Term Goals  Short term goals  Time Frame for Short term goals: 1 week  Short term goal 1: Pt to increase standing endurance to tolerate > 5 min with bilateral UE support and CGA in prep for hand release to complete clothign management  Short term goal 2: Pt to complete LB ADL tasks with use LHAE as needed with min A  Short term goal 3: Pt will complete BUE AROM greater than 90 degrees with no > than Min A and min VC to increase shoulder flexion to reach outside base of support for needed object to complete ADL tasks  Short term goal 4: Pt to complete sit to stand transfers with min A consistently and no no cues for safety to increase indep with completing toilet transfers  Short term goal 5: Pt will ambulate to and from the bathroom with SBA using RW to improve access to the bathroom.   Long term goals  Time Frame for Long term goals : 3 week  Long term goal 1: Pt to complete BADL routine with SBA and no cues for safety  Long term goal 2: Pt to complete sit to stand transfers from various surfaces including toilet with SBA and no ceus for safety  Long term goal 3: Pt to demo standing balance with unilateral UE handn release with SBA to complete clothign management after dressign and toielting      Following session, patient left in safe position with all fall risk precautions in place.

## 2022-01-21 NOTE — PROGRESS NOTES
Alert, oriented to person, place, time. Calm, appropriate interaction with staff. ERLIN 3mm to 2mm, brisk. Mucous membranes pink, moist. Tongue midline. Smile symmetrical. Speech clear. Hair shiny. Skin warm, dry. Heart sounds strong, regular. Respirations easy, regular. Lung sounds clear anterior, posterior, lateral. No cough noted. Abdominal sounds active in all 4 quadrants. Abdomen round, soft, non tender to palpation. States last bowel movement was yesterday morning. Denies difficulty with urination or passing gas. Radial pulses strong, equal. Hand grasp strong bilaterally. Arm drift negative bilaterally. Capillary refill less than 3 seconds. Skin turgor sluggish. Pedal pulses strong, equal. Pedal push and pull strong bilaterally. Jeanne's sign negative bilaterally. Leg lift strong bilaterally. No edema noted. Denies numbness and tingling. State pain is 0 out of 10. In wheel chair with bedside table and call light within reach. Denies needs at present time.

## 2022-01-21 NOTE — PROGRESS NOTES
[address] [facility] THERAPY  254 [facility]  DAILY NOTE    TIME   SLP Individual Minutes  Time In: 1430  Time Out: 1500  Minutes: 30  Timed Code Treatment Minutes: 30 Minutes      Date: 2022  Patient Name: Stevenson Smith      CSN: [id number]   : 1950  (73 y.o.)  Gender: female   Referring Physician:  Ambrose Johnson MD  Diagnosis: Personal history of COVID-19  Secondary Diagnosis: Dysphagia, Cognitive Impairment  Precautions: Aspiration Risk, Fall Risk  Current Diet: Regular with Thin - advanced 2022  Swallowing Strategies: Full Upright Position, Small Bite/Sip, Pulmonary Monitoring, Alternate Solids and Liquids, Limit Distractions and Monitor for Fatigue           Date of Last MBS/FEES: MBS 2022    Pain:  No pain reported. Subjective:  Patient agreeable to complete hospital-wide navigation task. Alert and pleasant.  also present throughout. Short-Term Goals:  SHORT TERM GOAL #1:  Goal 1: Patient will consume a regular texture diet with thin liquids while implementing recommended swallow strategies with no overt s/s of aspiration and adequate endurance to assist with meeting nutrition/hydration needs. INTERVENTIONS: Did not address this session d/t focus on other goals. SHORT TERM GOAL #2:  Goal 2: Patient will consume advanced PO trials with ST while demonstrating timely/coordinated mastication, complete bolus clearance, no overt s/s of aspiration, and adeqaute endurance for potential diet advancement to least restrictive diet   INTERVENTIONS: Did not address this session d/t focus on other goals. SHORT TERM GOAL #3:  Goal 3: Patient will complete pharyngeal strengthening exercises x10-15 (effortful swallow, chris) to improve overall timeliness of swallow, airway protection, and decrease pharyngeal residue. INTERVENTIONS: Did not address this session d/t focus on other goals.     SHORT TERM GOAL #4:  Goal 4: Patient will complete orientation and immediate/delayed/working memory tasks with 85% accuracy given min cuing in order to improve retention of novel information in current environment. INTERVENTIONS: Summary recall of locations within hospital navigation task - 4/5 indep, 1/5 min cues    SHORT TERM GOAL #5:  Goal 5: Patient will complete verbal/visual reasoning and safety problem solving tasks with 85% accuracy given min cuing in order to improve independent completion of IADLs. INTERVENTIONS: Patient finished remainder of deductive reasoning puzzle as last nights HEP with 100% success indep. SHORT TERM GOAL #6:  Goal 6: Patient will complete executive functioning tasks (i.e., medications, finances, scheduling) with 85% accuracy given min cuing in order to improve independent completion of IADLs with support of family. INTERVENTIONS: Hospital-wide navigational task - 100% indep, though required some redirections throughout for improved sustained attention to task in general    Long-Term Goals:  Timeframe for Long-term Goals: 6 weeks    LONG TERM GOAL #1:  Goal 1: Patient will safely consume regular diet with thin liquids with implementation of compensatory swallowing strategies and withOUT overt s/s of aspiration in order to maintain adequate nutrition and hydration measures. LONG TERM GOAL #2:  Goal 2: Patient will improve cognitive functioning to at least a modified independent level in order to improve safety of IADL completion upon potential discharge to home environment.       Comprehension: 7 - Patient understands complex ideas (math/planning)  Expression: 7 - Patient expresses complex ideas/needs  Social Interaction: 6 - Patient requires medication for mood and/or effect  Problem Solvin - Patient able to solve simple/routine tasks  Memory: 4 - Patient remembers 75-90%+ of the time    EDUCATION:  Learner: Patient and Significant Other  Education:  Reviewed ST goals and Plan of Care and Reviewed recommendations for follow-up  Evaluation of Education: Verbalizes understanding    ASSESSMENT/PLAN:  Activity Tolerance:  Patient tolerance of treatment: good. Assessment/Plan: Patient progressing toward established goals. Continues to require skilled care of licensed speech pathologist to progress toward achievement of established goals and plan of care.     Plan for Next Session: pharyngeal strengthening exercises, finances       PEPPER Aguilar [id number]

## 2022-01-21 NOTE — PROGRESS NOTES
Alert, oriented to person, place, time. Calm, appropriate interaction with staff. ERLIN 4mm to 3 mm, brisk. Mucous membranes pink, moist. Speech clear. Hair shiny. Skin warm, dry. Smile symmetrical. Tongue midline. Lung sounds clear anterior, posterior, lateral. Heart sounds regular, strong. Respirations easy, regular. Unproductive cough noted. Abdominal sounds hyperactive all 4 quadrants. Abdomin round, soft, non tender to palpation. States last bowel movement was yesterday morning. Denies difficulty with urinating and passing gas. Radial pulses strong, equal. Hand grasp strong bilaterally. Arm drift negative bilaterally. Capillary refill less than 3 seconds. Skin turgor sluggish. Pedal pulses strong, equal. Pedal push and pull strong bilaterally. Jeanne's sign negative bilaterally. No edema noted. Leg lift strong, equal. Denies numbness and tingling. States pain is a 1 out of 10. In wheel chair, wheels locked. Call light and bedside table within reach. Denies any needs at present time.

## 2022-01-21 NOTE — PROGRESS NOTES
[address] [address]  INPATIENT PHYSICAL THERAPY  Daily Note  [address],    Time In: 0830  Time Out: 0930  Timed Code Treatment Minutes: 60 Minutes  Minutes: 60          Date: 2022  Patient Name: Tone Vera,  Gender:  female        MRN: [id number]  : 1950  (73 y.o.)     Referring Practitioner: Sri Gonzalez MD  Diagnosis: Personal history of COVID-19  Additional Pertinent Hx: Per Acute notes, \"Kaleigh Chandra is a 68-year-old white female who presented to [facility] on 2021 with complaints of shortness of breath she has a past medical history lifetime non-smoker, CAD with stent placement in , hypertension, diabetes mellitus, dyslipidemia. Per report she presented to [facility] on  with complaints of hypoxia and shortness of breath. She was initially placed on 4 L nasal cannula.  patient had persistent hypoxia and was transitioned to high flow nasal cannula. On  patient was transitioned to BiPAP and transferred to [facility] ICU for intubation. Pt extubated . \"     Prior Level of Function:  Lives With: Spouse  Type of Home: House  Home Layout: One level  Home Access: Stairs to enter without rails  Entrance Stairs - Number of Steps: 1 step (very small)  Home Equipment:  (none)   Bathroom Shower/Tub: Walk-in shower (+ jaquazi)  Bathroom Toilet: Handicap height  Bathroom Equipment: Built-in shower seat (pt stating she has a bench right outside shower as well to sit on dry off)  Bathroom Accessibility: Accessible    ADL Assistance: Independent  Homemaking Assistance: Independent  Ambulation Assistance: Independent  Transfer Assistance: Independent  Active : Yes  IADL Comments: 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  Pt stating her children all live close and would be willing to help. Spouse in good health as well to assist.    Restrictions/Precautions:  Restrictions/Precautions: General Precautions,Fall Risk  Position Activity Restriction  Other position/activity restrictions: out of COVID isolation 1/6/22, monitor BP     SUBJECTIVE: Pt. Seated in WC and pleasantly agrees to therapy session. PAIN: None indicated    Vitals: Oxygen: 94% at rest, decreases to 85% with activity requiring extended rest breaks    OBJECTIVE:  Bed Mobility:  Not Tested    Transfers:  Sit to Stand: [employer], cues for hand placement  Stand to Sit:Contact Guard Assistance    Ambulation:  Contact Guard Assistance  Distance: 60' x 2  Surface: Level Tile  Device:Rolling Walker  Gait Deviations: Forward Flexed Posture, Decreased Step Length Bilaterally, Decreased Gait Speed and Narrow Base of Support     Pt. Completed dynamic gait activity tapping pods with ambulation using Rolling Walker to improve gait mechanics and hip/quad stability during SLS for improved functional mobility. Stairs:  Stairs:  4\" steps. X 4 using Bilateral Handrails and Contact Guard Assistance. Pt. Descending in retro. Balance:  Pt. Completed standing dynamic balance activity: ring toss with Normal DOMENICO on foam Surface and No UE support with Contact Guard Assistance. Activity completed to improve balance, enhance functional mobility, and reduce risk of falls. Pt. Completed standing dynamic balance activity: swatting balloon with flyswatter with Normal DOMENICO on level tile and Single UE support on Rolling Walker with Stand By Assistance. Activity completed to improve balance, enhance functional mobility, and reduce risk of falls. Exercise:  Patient was guided in 1 set(s) 10 reps of exercises: Glut sets, Seated marches, Seated hamstring curls, Seated heel/toe raises, Long arc quads, Seated isometric hip adduction, Seated abduction/adduction, and abdominal isometrics.   Exercises were completed for increased independence with functional mobility. Functional Outcome Measures: Not completed       ASSESSMENT:  Assessment: Patient progressing toward established goals. Activity Tolerance:  Patient tolerance of  treatment: good. Equipment Recommendations: Other: will monitor for needs pending progress and DC destination  Discharge Recommendations: Continue to assess pending progress     Plan: Times per week: 5x/wk 90 min; 1x/wk 30 min  Current Treatment Recommendations: Strengthening,Positioning,Patient/Caregiver Education & Training,Safety Education & Training,Balance Training,Endurance Training,Functional Mobility Training,Neuromuscular Re-education,Gait Training,Transfer Training,Home Exercise Program,Equipment Evaluation, Education, & procurement    Patient Education  Patient Education: Plan of Care, Transfers, Gait, Stairs, Verbal Exercise Instruction, Health Promotion and Wellness Education    Goals:  Patient goals : to get stronger  Short term goals  Time Frame for Short term goals: 1 week  Short term goal 1: Pt to be CGA for supine <> sit to get in/out of bed  Short term goal 2: Pt to be CGA for sit <> stand to get up to ambulate/transfer  Short term goal 3: Pt to be CGA for stand-pivot with RW to get between seats  Short term goal 4: Pt to ambulate > 10 ft with RW with Min A to get to bathroom  Short term goal 5: Pt to complete car transfer with Min A for transportation needs  Long term goals  Time Frame for Long term goals : 3 weeks  Long term goal 1: Pt to be Supervision for supine <> sit to get in/out of bed  Long term goal 2: Pt to be Supervision for sit <> stand to get up to ambulate/transfer  Long term goal 3: Pt to be Supervision for stand-pivot with RW to get between seats  Long term goal 4: Pt to ambulate > 50 ft with RW with SBA for household distances  Long term goal 5: Pt to complete car transfer with Supervision for transportation needs    Following session, patient left in safe position with all fall risk precautions in place.

## 2022-01-22 LAB — GLUCOSE BLD-MCNC: 186 MG/DL (ref 70–108)

## 2022-01-22 PROCEDURE — 6370000000 HC RX 637 (ALT 250 FOR IP): Performed by: PHYSICAL MEDICINE & REHABILITATION

## 2022-01-22 PROCEDURE — 6360000002 HC RX W HCPCS: Performed by: PHYSICAL MEDICINE & REHABILITATION

## 2022-01-22 PROCEDURE — 82948 REAGENT STRIP/BLOOD GLUCOSE: CPT

## 2022-01-22 PROCEDURE — 1180000000 HC REHAB R&B

## 2022-01-22 RX ADMIN — ROSUVASTATIN 10 MG: 10 TABLET, FILM COATED ORAL at 20:08

## 2022-01-22 RX ADMIN — ASPIRIN 81 MG CHEWABLE TABLET 81 MG: 81 TABLET CHEWABLE at 08:41

## 2022-01-22 RX ADMIN — GLIPIZIDE 5 MG: 5 TABLET ORAL at 07:38

## 2022-01-22 RX ADMIN — ACETAMINOPHEN 650 MG: 325 TABLET ORAL at 20:08

## 2022-01-22 RX ADMIN — Medication 6 MG: at 20:08

## 2022-01-22 RX ADMIN — FAMOTIDINE 20 MG: 20 TABLET, FILM COATED ORAL at 08:41

## 2022-01-22 RX ADMIN — MICONAZOLE NITRATE: 2 POWDER TOPICAL at 20:09

## 2022-01-22 RX ADMIN — FUROSEMIDE 40 MG: 40 TABLET ORAL at 08:41

## 2022-01-22 RX ADMIN — CALCIUM POLYCARBOPHIL 625 MG: 625 TABLET, FILM COATED ORAL at 08:41

## 2022-01-22 RX ADMIN — FAMOTIDINE 20 MG: 20 TABLET, FILM COATED ORAL at 20:08

## 2022-01-22 RX ADMIN — DOCUSATE SODIUM 100 MG: 100 CAPSULE, LIQUID FILLED ORAL at 08:41

## 2022-01-22 RX ADMIN — ENOXAPARIN SODIUM 30 MG: 100 INJECTION SUBCUTANEOUS at 08:41

## 2022-01-22 RX ADMIN — GLIPIZIDE 5 MG: 5 TABLET ORAL at 17:06

## 2022-01-22 RX ADMIN — DOCUSATE SODIUM 100 MG: 100 CAPSULE, LIQUID FILLED ORAL at 20:08

## 2022-01-22 RX ADMIN — MICONAZOLE NITRATE: 2 POWDER TOPICAL at 08:44

## 2022-01-22 ASSESSMENT — PAIN DESCRIPTION - FREQUENCY: FREQUENCY: CONTINUOUS

## 2022-01-22 ASSESSMENT — PAIN DESCRIPTION - LOCATION: LOCATION: HIP

## 2022-01-22 ASSESSMENT — PAIN DESCRIPTION - DESCRIPTORS: DESCRIPTORS: ACHING

## 2022-01-22 ASSESSMENT — PAIN DESCRIPTION - ONSET: ONSET: ON-GOING

## 2022-01-22 ASSESSMENT — PAIN DESCRIPTION - ORIENTATION: ORIENTATION: RIGHT;LEFT

## 2022-01-22 ASSESSMENT — PAIN DESCRIPTION - PAIN TYPE: TYPE: ACUTE PAIN

## 2022-01-22 ASSESSMENT — PAIN SCALES - GENERAL: PAINLEVEL_OUTOF10: 2

## 2022-01-22 NOTE — PROGRESS NOTES
Patient: Kina Avery  Unit/Bed: 4W-41/571-O  YOB: 1950  MRN: [id number] Acct: [de-identified]   Admitting Diagnosis: Personal history of COVID-19 [Z86.16]  Admit Date:  1/14/2022  Hospital Day: 8    Assessment:     Principal Problem:    Critical illness myopathy  Active Problems:    Debility    Physical deconditioning    History of COVID-19    Dysarthria    Primary hypertension    Type 2 diabetes mellitus ([facility] [address]    Obesity (BMI 30-39. 9)    History of coronary artery disease    History of coronary angioplasty with insertion of stent    Cardiomyopathy ([facility] [address]  Resolved Problems:    * No resolved hospital problems. *      Plan:     She states that the monitor reads in the 150-160 fasting. Since the monitor matched the CS well earlier will not make any change in meds. Subjective:     Patient has no complaint of CP or SOB. .   Medication side effects: none    Scheduled Meds:   [START ON 1/23/2022] enoxaparin  30 mg SubCUTAneous Daily    melatonin  6 mg Oral Nightly    glipiZIDE  5 mg Oral BID WC    aspirin  81 mg Oral Daily    docusate sodium  100 mg Oral BID    famotidine  20 mg Oral BID    furosemide  40 mg Oral Daily    polycarbophil  625 mg Oral Daily    rosuvastatin  10 mg Oral Daily    miconazole   Topical BID     Continuous Infusions:   dextrose       PRN Meds:senna, diclofenac sodium, polyethylene glycol, dextrose, acetaminophen, albuterol sulfate HFA, bisacodyl, dextrose, dextrose, glucagon (rDNA), glucose, hydrOXYzine, magnesium hydroxide, ondansetron, nitroGLYCERIN    Review of Systems  Pertinent items are noted in HPI. Objective:     No data found. I/O last 3 completed shifts: In: 2000 [P.O.:2000]  Out: -   I/O this shift:   In: 720 [P.O.:720]  Out: -     /67   Pulse 72   Temp 97.7 °F (36.5 °C) (Oral)   Resp 15   Ht 5' 2\" (1.575 m)   Wt 197 lb 9.6 oz (89.6 kg)   LMP  (LMP Unknown)   SpO2 95%   Breastfeeding No   BMI 36.14 kg/m²     General appearance: alert, appears stated age and cooperative  Head: Normocephalic, without obvious abnormality, atraumatic  Lungs: clear to auscultation bilaterally  Heart: regular rate and rhythm, S1, S2 normal, no murmur, click, rub or gallop  Abdomen: soft, non-tender; bowel sounds normal; no masses,  no organomegaly  Extremities: extremities normal, atraumatic, no cyanosis or edema  Skin: Skin color, texture, turgor normal. No rashes or lesions  Neurologic: weak    Data Review:   Results for Mami Rae (MRN [id number]) as of 1/22/2022 16:32   Ref.  Range 1/18/2022 09:22 1/19/2022 08:31 1/20/2022 08:30 1/21/2022 08:06 1/22/2022 08:41   POC Glucose Latest Ref Range: 70 - 108 mg/dl 177 (H) 184 (H) 156 (H) 196 (H) 186 (H)       Electronically signed by Desiree Boone MD on 1/22/2022 at 4:30 PM

## 2022-01-22 NOTE — PLAN OF CARE
Problem: Falls - Risk of:  Goal: Will remain free from falls  Description: Will remain free from falls  Outcome: Ongoing  Note: Call light within reach   Goal: Absence of physical injury  Description: Absence of physical injury  Outcome: Ongoing     Problem: Skin Integrity:  Goal: Will show no infection signs and symptoms  Description: Will show no infection signs and symptoms  Outcome: Ongoing  Goal: Absence of new skin breakdown  Description: Absence of new skin breakdown  Outcome: Ongoing  Note: Weight shifts while in bed and chair      Problem: Mobility - Impaired:  Goal: Mobility will improve  Description: Mobility will improve  Outcome: Ongoing     Problem: Bleeding:  Goal: Will show no signs and symptoms of excessive bleeding  Description: Will show no signs and symptoms of excessive bleeding  Outcome: Ongoing     Problem: Pain:  Goal: Pain level will decrease  Description: Pain level will decrease  Outcome: Ongoing  Note: Denies pain at this time   Goal: Control of acute pain  Description: Control of acute pain  Outcome: Ongoing  Goal: Control of chronic pain  Description: Control of chronic pain  Outcome: Ongoing     Problem: Breathing Pattern - Ineffective:  Goal: Ability to achieve and maintain a regular respiratory rate will improve  Description: Ability to achieve and maintain a regular respiratory rate will improve  Outcome: Ongoing     Problem: Serum Glucose Level - Abnormal:  Goal: Ability to maintain appropriate glucose levels has stabilized  Description: Ability to maintain appropriate glucose levels has stabilized  Outcome: Ongoing     Problem: IP DRESSINGS LOWER EXTREMITIES  Goal: LTG - patient will dress lower body with or without assistive device  Outcome: Ongoing     Problem: IP TOILETING  Goal: LTG - Patient will utilize adaptive techniques/equipment to complete daily toileting tasks  Outcome: Ongoing     Problem: IP TRANSFERS  Goal: LTG - patient will transfer from one surface to another  Outcome: Ongoing     Problem: IP COMMUNICATION/DYSARTHRIA  Goal: LTG - Patient will effectively communicate in all situations with the use of compensatory strategies  Outcome: Ongoing     Problem: IP SWALLOWING  Goal: LTG - patient will tolerate the least restrictive diet consistency to allow for safe consumption of daily meals  Outcome: Ongoing     Problem: IP MOBILITY  Goal: LTG - patient will demonstrate safe mobility requirements  Outcome: Ongoing     Problem: DISCHARGE BARRIERS  Goal: Patient's continuum of care needs are met  Outcome: Ongoing  Note: Monitor until discharge

## 2022-01-23 LAB — GLUCOSE BLD-MCNC: 186 MG/DL (ref 70–108)

## 2022-01-23 PROCEDURE — 6370000000 HC RX 637 (ALT 250 FOR IP): Performed by: PHYSICAL MEDICINE & REHABILITATION

## 2022-01-23 PROCEDURE — 97530 THERAPEUTIC ACTIVITIES: CPT

## 2022-01-23 PROCEDURE — 82948 REAGENT STRIP/BLOOD GLUCOSE: CPT

## 2022-01-23 PROCEDURE — 6360000002 HC RX W HCPCS: Performed by: FAMILY MEDICINE

## 2022-01-23 PROCEDURE — 97535 SELF CARE MNGMENT TRAINING: CPT

## 2022-01-23 PROCEDURE — 97116 GAIT TRAINING THERAPY: CPT

## 2022-01-23 PROCEDURE — 1180000000 HC REHAB R&B

## 2022-01-23 RX ADMIN — ROSUVASTATIN 10 MG: 10 TABLET, FILM COATED ORAL at 20:09

## 2022-01-23 RX ADMIN — FUROSEMIDE 40 MG: 40 TABLET ORAL at 08:49

## 2022-01-23 RX ADMIN — FAMOTIDINE 20 MG: 20 TABLET, FILM COATED ORAL at 20:09

## 2022-01-23 RX ADMIN — FAMOTIDINE 20 MG: 20 TABLET, FILM COATED ORAL at 08:49

## 2022-01-23 RX ADMIN — ACETAMINOPHEN 650 MG: 325 TABLET ORAL at 20:09

## 2022-01-23 RX ADMIN — DOCUSATE SODIUM 100 MG: 100 CAPSULE, LIQUID FILLED ORAL at 08:49

## 2022-01-23 RX ADMIN — GLIPIZIDE 5 MG: 5 TABLET ORAL at 07:46

## 2022-01-23 RX ADMIN — DOCUSATE SODIUM 100 MG: 100 CAPSULE, LIQUID FILLED ORAL at 20:09

## 2022-01-23 RX ADMIN — MICONAZOLE NITRATE: 2 POWDER TOPICAL at 09:00

## 2022-01-23 RX ADMIN — ASPIRIN 81 MG CHEWABLE TABLET 81 MG: 81 TABLET CHEWABLE at 08:49

## 2022-01-23 RX ADMIN — Medication 6 MG: at 20:09

## 2022-01-23 RX ADMIN — GLIPIZIDE 5 MG: 5 TABLET ORAL at 16:37

## 2022-01-23 RX ADMIN — ENOXAPARIN SODIUM 30 MG: 100 INJECTION SUBCUTANEOUS at 08:48

## 2022-01-23 RX ADMIN — CALCIUM POLYCARBOPHIL 625 MG: 625 TABLET, FILM COATED ORAL at 08:49

## 2022-01-23 ASSESSMENT — PAIN DESCRIPTION - ORIENTATION: ORIENTATION: RIGHT;LEFT

## 2022-01-23 ASSESSMENT — PAIN DESCRIPTION - DESCRIPTORS: DESCRIPTORS: ACHING

## 2022-01-23 ASSESSMENT — ENCOUNTER SYMPTOMS
RHINORRHEA: 0
TROUBLE SWALLOWING: 0
DIARRHEA: 0
SHORTNESS OF BREATH: 0
VOMITING: 0
BACK PAIN: 0
SORE THROAT: 0
CONSTIPATION: 0
ABDOMINAL PAIN: 0
NAUSEA: 0
COUGH: 0
WHEEZING: 0

## 2022-01-23 ASSESSMENT — PAIN DESCRIPTION - FREQUENCY: FREQUENCY: CONTINUOUS

## 2022-01-23 ASSESSMENT — PAIN DESCRIPTION - LOCATION: LOCATION: HIP

## 2022-01-23 ASSESSMENT — PAIN SCALES - GENERAL
PAINLEVEL_OUTOF10: 6
PAINLEVEL_OUTOF10: 0
PAINLEVEL_OUTOF10: 6

## 2022-01-23 ASSESSMENT — PAIN DESCRIPTION - PAIN TYPE: TYPE: ACUTE PAIN

## 2022-01-23 NOTE — PROGRESS NOTES
[address]  Occupational Therapy  Daily Note  Time:   Time In: 1030  Time Out: 1100  Timed Code Treatment Minutes: 30 Minutes  Minutes: 30          Date: 2022  Patient Name: Sonia Orta,   Gender: female      Room: [facility]/068-A  MRN: [id number]  : 1950  (73 y.o.)  Referring Practitioner: Dr. Ginny Dee  Diagnosis: personal history of COVID 19  Additional Pertinent Hx: Lady Damon is a 44-year-old white female who presented to [facility] on 2021 with complaints of shortness of breath she has a past medical history lifetime non-smoker, CAD with stent placement in , hypertension, diabetes mellitus, dyslipidemia. Per report she presented to [facility] on  with complaints of hypoxia and shortness of breath. She was initially placed on 4 L nasal cannula.  patient had persistent hypoxia and was transitioned to high flow nasal cannula. On  patient was transitioned to BiPAP and transferred to [facility] ICU for intubation. Pt extubated . Restrictions/Precautions:  Restrictions/Precautions: General Precautions,Fall Risk  Position Activity Restriction  Other position/activity restrictions: out of COVID isolation 22, monitor BP     SUBJECTIVE: Upon entering room pt seated up in wheelchair, agreeable t OT. Pleasant and cooperative. PAIN: none reported     Vitals: Oxygen: at rest room air 96%, with activity 86%    COGNITION: Decreased Problem Solving and Decreased Safety Awareness    ADL:   Grooming: Stand By Assistance. Standing sink side brushing teeth. BALANCE:  Standing Balance: Stand By Assistance. at RW level    BED MOBILITY:  Sit to Supine: Stand By Assistance . TRANSFERS:  Sit to Stand:  Stand By Assistance. from wheelchair  Stand to Sit: Stand By Assistance. into wheelchair    FUNCTIONAL MOBILITY:  Assistive Device: Rolling Walker  Assist Level:  Stand By Assistance. Distance: To and from bathroom and and gathering clothes in room and placing in dirty clothing drawer  Requiring cues for required seated rest break due to O2     ASSESSMENT:     Activity Tolerance:  Patient tolerance of  treatment: fair. Pt tolerated session well      Discharge Recommendations: Continue to assess pending progress  Equipment Recommendations: Equipment Needed: Yes  Mobility Devices: Walker  Other: Recommend LHAE kit and shower chair. Plan: Times per week: 5x week/90 min, 1x week/30 min  Current Treatment Recommendations: Strengthening,Balance Training,Functional Mobility Training,Endurance Training,Home Management Training,Patient/Caregiver Education & Training,Self-Care / ADL,Safety Education & Training    Patient Education  Patient Education: Role of OT, Plan of Care, ADL's and Energy Conservation    Goals  Short term goals  Time Frame for Short term goals: 1 week  Short term goal 1: Pt to increase standing endurance to tolerate > 5 min with bilateral UE support and CGA in prep for hand release to complete clothign management  Short term goal 2: Pt to complete LB ADL tasks with use LHAE as needed with min A  Short term goal 3: Pt will complete BUE AROM greater than 90 degrees with no > than Min A and min VC to increase shoulder flexion to reach outside base of support for needed object to complete ADL tasks  Short term goal 4: Pt to complete sit to stand transfers with min A consistently and no no cues for safety to increase indep with completing toilet transfers  Short term goal 5: Pt will ambulate to and from the bathroom with SBA using RW to improve access to the bathroom.   Long term goals  Time Frame for Long term goals : 3 week  Long term goal 1: Pt to complete BADL routine with SBA and no cues for safety  Long term goal 2: Pt to complete sit to stand transfers from various surfaces including toilet with SBA and no ceus for safety  Long term goal 3: Pt to demo standing balance with unilateral UE handn release with SBA to complete clothign management after dressign and toielting    Following session, patient left in safe position with all fall risk precautions in place.

## 2022-01-23 NOTE — PLAN OF CARE
Problem: Falls - Risk of:  Goal: Will remain free from falls  Description: Will remain free from falls  Outcome: Ongoing  Note: Bed and chair alarm on   Goal: Absence of physical injury  Description: Absence of physical injury  Outcome: Ongoing  Note: Call light within reach      Problem: Skin Integrity:  Goal: Will show no infection signs and symptoms  Description: Will show no infection signs and symptoms  Outcome: Ongoing  Note: No open wounds.  Labs work stable   Goal: Absence of new skin breakdown  Description: Absence of new skin breakdown  Outcome: Ongoing  Note: Weight shifts while in bed and chair      Problem: Mobility - Impaired:  Goal: Mobility will improve  Description: Mobility will improve  Outcome: Ongoing     Problem: Bleeding:  Goal: Will show no signs and symptoms of excessive bleeding  Description: Will show no signs and symptoms of excessive bleeding  Outcome: Ongoing     Problem: Pain:  Goal: Pain level will decrease  Description: Pain level will decrease  Outcome: Ongoing  Goal: Control of acute pain  Description: Control of acute pain  Outcome: Ongoing  Goal: Control of chronic pain  Description: Control of chronic pain  Outcome: Ongoing     Problem: Breathing Pattern - Ineffective:  Goal: Ability to achieve and maintain a regular respiratory rate will improve  Description: Ability to achieve and maintain a regular respiratory rate will improve  Outcome: Ongoing     Problem: Serum Glucose Level - Abnormal:  Goal: Ability to maintain appropriate glucose levels has stabilized  Description: Ability to maintain appropriate glucose levels has stabilized  Outcome: Ongoing     Problem: IP DRESSINGS LOWER EXTREMITIES  Goal: LTG - patient will dress lower body with or without assistive device  Outcome: Ongoing     Problem: IP TOILETING  Goal: LTG - Patient will utilize adaptive techniques/equipment to complete daily toileting tasks  Outcome: Ongoing     Problem: IP TRANSFERS  Goal: LTG - patient will transfer from one surface to another  Outcome: Ongoing     Problem: IP COMMUNICATION/DYSARTHRIA  Goal: LTG - Patient will effectively communicate in all situations with the use of compensatory strategies  Outcome: Ongoing     Problem: IP SWALLOWING  Goal: LTG - patient will tolerate the least restrictive diet consistency to allow for safe consumption of daily meals  Outcome: Ongoing     Problem: IP MOBILITY  Goal: LTG - patient will demonstrate safe mobility requirements  Outcome: Ongoing     Problem: DISCHARGE BARRIERS  Goal: Patient's continuum of care needs are met  Outcome: Ongoing  Note: Monitor until discharge

## 2022-01-23 NOTE — PROGRESS NOTES
Patient: Mandeep Roberson  Unit/Bed: 4D-01/797-X  YOB: 1950  MRN: [id number] Acct: [de-identified]   Admitting Diagnosis: Personal history of COVID-19 [Z86.16]  Admit Date:  1/14/2022  Hospital Day: 9    Assessment:     Principal Problem:    Critical illness myopathy  Active Problems:    Debility    Physical deconditioning    History of COVID-19    Dysarthria    Primary hypertension    Type 2 diabetes mellitus ([facility] [address]    Obesity (BMI 30-39. 9)    History of coronary artery disease    History of coronary angioplasty with insertion of stent    Cardiomyopathy ([facility]  Resolved Problems:    * No resolved hospital problems. *      Plan:     The glucose readings being recorded are being taken after she's eaten. She stated the fasting readings from her device are in the 150's still. Subjective:     Patient has no complaint of CP or SOB. .   Medication side effects: none    Scheduled Meds:   enoxaparin  30 mg SubCUTAneous Q24H    melatonin  6 mg Oral Nightly    glipiZIDE  5 mg Oral BID WC    aspirin  81 mg Oral Daily    docusate sodium  100 mg Oral BID    famotidine  20 mg Oral BID    furosemide  40 mg Oral Daily    polycarbophil  625 mg Oral Daily    rosuvastatin  10 mg Oral Daily    miconazole   Topical BID     Continuous Infusions:   dextrose       PRN Meds:senna, diclofenac sodium, polyethylene glycol, dextrose, acetaminophen, albuterol sulfate HFA, bisacodyl, dextrose, dextrose, glucagon (rDNA), glucose, hydrOXYzine, magnesium hydroxide, ondansetron, nitroGLYCERIN    Review of Systems  Pertinent items are noted in HPI. Objective:     Patient Vitals for the past 8 hrs:   BP Temp Temp src Pulse Resp SpO2 Weight   01/23/22 0733 104/70 96.4 °F (35.8 °C) Oral 82 14 96 % --   01/23/22 0530 -- -- -- -- -- -- 198 lb 6.4 oz (90 kg)     I/O last 3 completed shifts: In: 1500 [P.O.:1500]  Out: -   No intake/output data recorded.     /70   Pulse 82   Temp 96.4 °F (35.8 °C) (Oral)   Resp 14   Ht 5' 2\" (1.575 m)   Wt 198 lb 6.4 oz (90 kg)   LMP  (LMP Unknown)   SpO2 96%   Breastfeeding No   BMI 36.29 kg/m²     General appearance: alert, appears stated age and cooperative  Head: Normocephalic, without obvious abnormality, atraumatic  Lungs: clear to auscultation bilaterally  Heart: regular rate and rhythm, S1, S2 normal, no murmur, click, rub or gallop  Abdomen: soft, non-tender; bowel sounds normal; no masses,  no organomegaly  Extremities: extremities normal, atraumatic, no cyanosis or edema  Skin: Skin color, texture, turgor normal. No rashes or lesions  Neurologic: weak    Data Review:   Results for Tamanna Morton (MRN [id number]) as of 1/23/2022 12:08   Ref.  Range 1/19/2022 16:28 1/20/2022 08:30 1/21/2022 08:06 1/22/2022 08:41 1/23/2022 08:25   POC Glucose Latest Ref Range: 70 - 108 mg/dl  156 (H) 196 (H) 186 (H) 186 (H)       Electronically signed by Elsi Solitario MD on 1/23/2022 at 12:07 PM

## 2022-01-23 NOTE — PROGRESS NOTES
Physical Medicine & Rehabilitation Progress Note    Chief Complaint:  Fatigue and tired    Subjective:    Sonia Orta is a 73 y.o. right-handed obese  female with history of hypertension, diabetes mellitus type 2, coronary artery disease requiring coronary artery stenting, status post bilateral eyes cataract surgeries, status post 3  sections, status post hysterectomy, status post hiatal hernia repair, status post sinus surgery, was admitted on 2022 for intensive inpatient management of impairment & disability secondary to critical illness myopathy and disability/physical decondition as result of COVID-19 pneumonia. The patient says he does not quite remember how she ended up in the hospital.     The patient apparently began experiencing reduced upper size, change in taste and smell, shortness of breath, nausea, dry heaves, and diarrhea starting in 2021. The symptom progressively worsened. She was advised by her PCP to visit ER. Therefore she went to [facility] ER on 2021. She was found to be hypoxic with O2 saturation in 70s% in room air. COVID test was performed which turned out to be positive. She was advised to be admitted but she left against medical advice because she preferred to be admitted to [address] [place] [address] instead. On 2021 she came to [address] [place] [address] ER with complaint of shortness of breath. She was found to have oxygen saturation in the 80s% in room air. She was admitted with diagnosis of acute hypoxic respiratory failure secondary to COVID-19 pneumonia. She was treated with Decadron and Olumiant (baricitnib). However her condition deteriorated despite of treatment. She was intubated on 2021 and was admitted to ICU. Her hospital course also was complicated by superimposed Klebsiella oxytocin bacterial pneumonia, bradycardia requiring dopamine infusion, and decubitus ulcer. Her condition later stabilized and improved. She was extubated on 1/2/2022. The patient says she feels well and offers no new complaint. She says her weakness continue to improve. She says she can stand up without needing assistance now. Her endurance continues to improve. She denies having any pain. She continues tolerating the intensive inpatient rehabilitation treatment well. She is projected to be ready for discharge on 1/28/2022. Rehabilitation:  PT: Reviewed. Transfers:  Sit to Stand: Contact Guard Assistance  Stand to [employer] Assistance     Ambulation:  Contact Guard Assistance  Distance: 70 feet x 2  Surface: Level Tile  Device:Rolling Walker  Gait Deviations:  Slow Haylee, Decreased Step Length Bilaterally, Decreased Gait Speed, Decreased Heel Strike Bilaterally, Narrow Base of Support and Decreased Terminal Knee Extension      OT: Reviewed. ADL:   Grooming: Modified Independent. brushed teeth sitting in w/c sinkside  Lower Extremity Dressing: Minimal Assistance. used sock aid and LHSH, A for back of R shoe, donned sitting in w/c.     BALANCE:  Standing Balance: Stand By Assistance. x 4 minutes with 1 UE support while filling egg carton with eggs     BED MOBILITY:  Not Tested     TRANSFERS:  Sit to Stand:  Stand By Assistance. w/c  Stand to Sit: Stand By Assistance.       FUNCTIONAL MOBILITY:  Assistive Device: Rolling Walker  Assist Level:  Contact Guard Assistance. Distance: total during session approx 20 feet  Propelled w/c with B UE to/from apartment with B UE, slow pace      ADDITIONAL ACTIVITIES:  Patient completed BUE strengthening exercises with skilled education on HEP: completed x10 reps x1 set with a orange band in all joints and all planes in order to improve UE strength and activity tolerance required for BADL routine and toilet / shower transfers. Patient tolerated well, requiring minimal rest breaks. Patient also required minimal cues for technique. Limited range in L shoulder      ST: Reviewed. Summary recall of locations within hospital navigation task - 4/5 indep, 1/5 min cues    Patient finished remainder of deductive reasoning puzzle as last nights HEP with 100% success indep. Hospital-wide navigational task - 100% indep, though required some redirections throughout for improved sustained attention to task in general      Review of Systems:  Review of Systems   Constitutional: Positive for fatigue. Negative for chills, diaphoresis and fever. HENT: Negative for hearing loss, rhinorrhea, sneezing, sore throat and trouble swallowing. Eyes: Negative for visual disturbance. Respiratory: Negative for cough, shortness of breath and wheezing. Cardiovascular: Negative for chest pain and palpitations. Gastrointestinal: Negative for abdominal pain, constipation, diarrhea, nausea and vomiting. Genitourinary: Negative for dysuria. Musculoskeletal: Positive for gait problem. Negative for arthralgias, back pain, myalgias and neck pain. Skin: Negative for rash. Neurological: Positive for weakness (Generalized). Negative for dizziness, tremors, light-headedness, numbness and headaches. Psychiatric/Behavioral: Negative for dysphoric mood, hallucinations and sleep disturbance. The patient is not nervous/anxious.          Objective:  /85   Pulse 87   Temp 98.7 °F (37.1 °C) (Oral)   Resp 18   Ht 5' 2\" (1.575 m)   Wt 200 lb 1.6 oz (90.8 kg)   LMP  (LMP Unknown)   SpO2 93%   Breastfeeding No   BMI 36.60 kg/m²   Physical Exam   General:  well-developed, well nourished obese  female ; in no acute distress ; appropriate affect & mood; sitting on reclining chair comfortably  Eyes: pupil equally round ; extra-ocular motion intact bilaterally  Head, Ear, Nose, Mouth & Throat : normocephalic ; no discharge from ears or nose ; no deformity ; no facial swelling ; oral mucosa pink   Neck :  supple ; no tenderness ; no muscle spasm  Cardiovascular : regular rate & rhythm ; normal S1 & S2 heart sound ; no murmur ; normal peripheral pulse at bilateral upper & lower extremities  Pulmonary : Breath sounds present at bilateral lung fields; no wheezing ; no rale; no crackle  Gastrointestinal : soft, protruded abdomen without tenderness ; normal bowel sound present   Back : no tenderness; no muscle spasm  Skin: no skin lesion or rash ; presence of mild nonpitting swelling at bilateral legs but no pitting edema at all 4 extremities  Musculoskeletal : no limb asymmetry; no limb deformity; no tenderness at bilateral upper extremities & bilateral lower extremities; no palpable mass at limbs ; no joints laxity or crepitation ; hip flexion passive ROM reaching 95 degrees bilaterally; ankle dorsiflexion passive ROM reaching 5 degrees bilaterally; otherwise normal functional joints ROM at the rest of bilateral upper & lower extremities  Cerebral :  alert ; awake ; oriented to place, person and time; follow two-step verbal command  Cerebellum : no dysmetria with bilateral finger-to-nose test ; unable to perform  heel-to-shin test on both sides  Cranial Nerves :  grossly intact CN II to XII function  Sensory : intact light touch and pin prick sensation at bilateral upper & lower extremities  Motor : normal tone at bilateral upper & lower extremities ; 4-/5 to 4+/5 muscle strength at bilateral hips flexion; otherwise 5/5 muscle strength at the rest of bilateral upper and the lower extremities  Reflex : 0 bilateral biceps, bilateral brachioradialis and bilateral knees reflexes   Pathological Reflex :  No Roberta's sign ; no ankle clonus  Gait : Not assessed      Diagnostics:   Recent Results (from the past 24 hour(s))   POCT glucose    Collection Time: 01/24/22  8:13 AM   Result Value Ref Range    POC Glucose 183 (H) 70 - 108 mg/dl     Results for Christina Boys (MRN [id number]) as of 1/21/2022 08:13   Ref.  Range 1/17/2022 08:09 1/18/2022 07:54 1/18/2022 09:22 1/19/2022 08:31 1/20/2022 08:30   POC Glucose Latest Ref Range: 70 - 108 mg/dl 144 (H) 171 (H) 177 (H) 184 (H) 156 (H)       Impression:  · Critical illness myopathy   · Debility/physical decondition secondary to MAVOS-00 pneumonia, complicated by bacterial pneumonia, bradycardia, and decubitus ulcer  · History of COVID-19 pneumonia with acute hypoxic respiratory failure requiring intubation, complicated by Klebsiella oxytocin bacterial pneumonia  · Mild dysphagia/moderate pharyngeal dysphagia  · Mild cognitive impairment  · Hypertension  · Diabetes mellitus type 2  · History of coronary artery disease requiring coronary artery stenting  · Severe cardiomyopathy with reduced ejection fraction  · Obesity  · Right buttock pain likely due to gluteus medius muscle strain  · Bilateral leg swelling, to rule out deep vein thrombosis    The patient's condition is stable. Her weakness continue to improve steadily. Her endurance also is getting better. She tolerated intensive inpatient rehabilitation treatment well over the weekend. Her function continues to improve slowly and steadily. The patient currently is projected to be ready for discharge on 1/28/2022. Plan:  · Continue intensive PT/OT/SLP/RT inpatient rehabilitation program at least 3 hours per day, 5 days per week in order to improve functional status prior to discharge. Family education and training will be completed. Equipment evaluations and recommendations will be completed as appropriate. · Rehabilitation nursing continue to be involved for bowel, bladder, skin, and pain management. Nursing will also provide education and training to patient and family. · Prophylaxis:  DVT: Lovenox, KENA stocking, intermittent pneumatic compression device. GI: Colace, FiberCon, Dulcolax suppository as needed, milk of magnesium as needed, GlycoLax as needed, Senokot as needed.   · Pain: Tylenol as needed; Voltaren gel as needed; heat pad application as needed  · Continue aspirin, Crestor for coronary artery disease  · Continue Pepcid for gastric protection  · Continue Lasix for hypertension  · Continue Crestor for hyperlipidemia  · Continue glipizide for diabetes mellitus  · Continue albuterol inhaler as needed for COVID-19 pneumonia  · Melatonin as needed for insomnia  · Nutrition:   Continue current diet  · Bladder: Monitoring signs or symptoms of UTI  · Bowel: Monitoring signs or symptoms of constipation  ·  and case management for coordination of care and discharge planning      Missed Therapy Time:  · None      Daija Brar MD

## 2022-01-23 NOTE — PROGRESS NOTES
[address] [address]  INPATIENT PHYSICAL THERAPY  DAILY NOTE  [address] [place] [address]    Time In: 6547  Time Out: 8185  Timed Code Treatment Minutes: 36 Minutes  Minutes: 36          Date: 2022  Patient Name: Merlin Barrs,  Gender:  female        MRN: [id number]  : 1950  (73 y.o.)     Referring Practitioner: Favio Dickerson MD  Diagnosis: Personal history of COVID-19  Additional Pertinent Hx: Per Acute notes, \"Kaleigh Toro is a 72-year-old white female who presented to [facility] on 2021 with complaints of shortness of breath she has a past medical history lifetime non-smoker, CAD with stent placement in , hypertension, diabetes mellitus, dyslipidemia. Per report she presented to [facility] on  with complaints of hypoxia and shortness of breath. She was initially placed on 4 L nasal cannula.  patient had persistent hypoxia and was transitioned to high flow nasal cannula. On  patient was transitioned to BiPAP and transferred to [facility] ICU for intubation. Pt extubated . \"     Prior Level of Function:  Lives With: Spouse  Type of Home: House  Home Layout: One level  Home Access: Stairs to enter without rails  Entrance Stairs - Number of Steps: 1 step (very small)  Home Equipment:  (none)   Bathroom Shower/Tub: Walk-in shower (+ jaquazi)  Bathroom Toilet: Handicap height  Bathroom Equipment: Built-in shower seat (pt stating she has a bench right outside shower as well to sit on dry off)  Bathroom Accessibility: Accessible    ADL Assistance: Independent  Homemaking Assistance: Independent  Ambulation Assistance: Independent  Transfer Assistance: Independent  Active : Yes  IADL Comments: 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  Pt stating her children all live close and would be willing to help. Spouse in good health as well to assist.    Restrictions/Precautions:  Restrictions/Precautions: General Precautions,Fall Risk  Position Activity Restriction  Other position/activity restrictions: out of [facility] isolation 1/6/22, monitor BP     SUBJECTIVE: Patient seated in w/c upon PTA arrival. Patient pleasant and agreeable to therapy treatment. PAIN: 2-3/10: bilateral LEs mm and joints    Vitals: Oxygen: taken at rest 93%. Taken after ambulation 83% with 1 min 30 seconds to recover with education on breathing techniques. Heart Rate: Taken at rest 88 bpm. Taken after ambulation 104 bpm.    OBJECTIVE:  Bed Mobility:  Not Tested    Transfers:  Sit to Stand: Contact Guard Assistance  Stand to Sit:Contact Guard Assistance    Ambulation:  Contact Guard Assistance  Distance: 70 feet x 2  Surface: Level Tile  Device:Rolling Walker  Gait Deviations:  Slow Haylee, Decreased Step Length Bilaterally, Decreased Gait Speed, Decreased Heel Strike Bilaterally, Narrow Base of Support and Decreased Terminal Knee Extension    Balance:  Not Tested    Exercise:  Patient was guided in 1 set(s) 15 reps of exercise to both lower extremities. Seated marches, Seated hamstring curls, Seated heel/toe raises, Long arc quads, Seated isometric hip adduction and Seated abduction/adduction. Exercises were completed for increased independence with functional mobility. Functional Outcome Measures: Not completed       ASSESSMENT:  Assessment: Patient progressing toward established goals. Activity Tolerance:  Patient tolerance of  treatment: good. Equipment Recommendations: Other: will monitor for needs pending progress and DC destination  Discharge Recommendations: Continue to assess pending progress  Plan: Times per week: 5x/wk 90 min; 1x/wk 30 min  Current Treatment Recommendations: Strengthening,Positioning,Patient/Caregiver Education & Training,Safety Education & Christal Goldman Training,Functional Mobility Training,Neuromuscular Re-education,Gait Training,Transfer Training,Home Exercise Program,Equipment Evaluation, Education, & procurement    Patient Education  Patient Education: Plan of Care, Transfers, Gait, Pursed Lip Breathing,  - Patient Verbalized Understanding    Goals:  Patient goals : to get stronger  Short term goals  Time Frame for Short term goals: 1 week  Short term goal 1: Pt to be CGA for supine <> sit to get in/out of bed  Short term goal 2: Pt to be CGA for sit <> stand to get up to ambulate/transfer  Short term goal 3: Pt to be CGA for stand-pivot with RW to get between seats  Short term goal 4: Pt to ambulate > 10 ft with RW with Min A to get to bathroom  Short term goal 5: Pt to complete car transfer with Min A for transportation needs  Long term goals  Time Frame for Long term goals : 3 weeks  Long term goal 1: Pt to be Supervision for supine <> sit to get in/out of bed  Long term goal 2: Pt to be Supervision for sit <> stand to get up to ambulate/transfer  Long term goal 3: Pt to be Supervision for stand-pivot with RW to get between seats  Long term goal 4: Pt to ambulate > 50 ft with RW with SBA for household distances  Long term goal 5: Pt to complete car transfer with Supervision for transportation needs    Following session, patient left in safe position with all fall risk precautions in place.

## 2022-01-24 LAB — GLUCOSE BLD-MCNC: 183 MG/DL (ref 70–108)

## 2022-01-24 PROCEDURE — 82948 REAGENT STRIP/BLOOD GLUCOSE: CPT

## 2022-01-24 PROCEDURE — 97530 THERAPEUTIC ACTIVITIES: CPT

## 2022-01-24 PROCEDURE — 97129 THER IVNTJ 1ST 15 MIN: CPT

## 2022-01-24 PROCEDURE — 97110 THERAPEUTIC EXERCISES: CPT

## 2022-01-24 PROCEDURE — 99232 SBSQ HOSP IP/OBS MODERATE 35: CPT | Performed by: PHYSICAL MEDICINE & REHABILITATION

## 2022-01-24 PROCEDURE — 1180000000 HC REHAB R&B

## 2022-01-24 PROCEDURE — 6360000002 HC RX W HCPCS: Performed by: FAMILY MEDICINE

## 2022-01-24 PROCEDURE — 97116 GAIT TRAINING THERAPY: CPT

## 2022-01-24 PROCEDURE — 97535 SELF CARE MNGMENT TRAINING: CPT

## 2022-01-24 PROCEDURE — 6370000000 HC RX 637 (ALT 250 FOR IP): Performed by: PHYSICAL MEDICINE & REHABILITATION

## 2022-01-24 PROCEDURE — 97130 THER IVNTJ EA ADDL 15 MIN: CPT

## 2022-01-24 RX ADMIN — ASPIRIN 81 MG CHEWABLE TABLET 81 MG: 81 TABLET CHEWABLE at 08:28

## 2022-01-24 RX ADMIN — FUROSEMIDE 40 MG: 40 TABLET ORAL at 08:29

## 2022-01-24 RX ADMIN — MICONAZOLE NITRATE: 2 POWDER TOPICAL at 08:27

## 2022-01-24 RX ADMIN — FAMOTIDINE 20 MG: 20 TABLET, FILM COATED ORAL at 08:29

## 2022-01-24 RX ADMIN — GLIPIZIDE 5 MG: 5 TABLET ORAL at 16:55

## 2022-01-24 RX ADMIN — CALCIUM POLYCARBOPHIL 625 MG: 625 TABLET, FILM COATED ORAL at 08:28

## 2022-01-24 RX ADMIN — DOCUSATE SODIUM 100 MG: 100 CAPSULE, LIQUID FILLED ORAL at 19:55

## 2022-01-24 RX ADMIN — Medication 6 MG: at 19:55

## 2022-01-24 RX ADMIN — DOCUSATE SODIUM 100 MG: 100 CAPSULE, LIQUID FILLED ORAL at 08:29

## 2022-01-24 RX ADMIN — FAMOTIDINE 20 MG: 20 TABLET, FILM COATED ORAL at 19:55

## 2022-01-24 RX ADMIN — ROSUVASTATIN 10 MG: 10 TABLET, FILM COATED ORAL at 19:55

## 2022-01-24 RX ADMIN — GLIPIZIDE 5 MG: 5 TABLET ORAL at 07:34

## 2022-01-24 RX ADMIN — ENOXAPARIN SODIUM 30 MG: 100 INJECTION SUBCUTANEOUS at 08:28

## 2022-01-24 ASSESSMENT — PAIN SCALES - GENERAL
PAINLEVEL_OUTOF10: 0
PAINLEVEL_OUTOF10: 0

## 2022-01-24 ASSESSMENT — ENCOUNTER SYMPTOMS
TROUBLE SWALLOWING: 0
ABDOMINAL PAIN: 0
COUGH: 0
SORE THROAT: 0
SHORTNESS OF BREATH: 0
CONSTIPATION: 0
BACK PAIN: 0
VOMITING: 0
DIARRHEA: 0
RHINORRHEA: 0
NAUSEA: 0
WHEEZING: 0

## 2022-01-24 NOTE — PROGRESS NOTES
[address] [place] [employer] THERAPY  [facility]  DAILY NOTE    TIME   SLP Individual Minutes  Time In: 1100  Time Out: 1130  Minutes: 30  Timed Code Treatment Minutes: 30 Minutes      Date: 2022  Patient Name: Hodan Mendoza      CSN: [id number]   : 1950  (73 y.o.)  Gender: female   Referring Physician:  Lou Phan MD  Diagnosis: Personal history of COVID-19  Secondary Diagnosis: Dysphagia, Cognitive Impairment  Precautions: Aspiration Risk, Fall Risk  Current Diet: Regular with Thin - advanced 2022  Swallowing Strategies: Full Upright Position, Small Bite/Sip, Pulmonary Monitoring, Alternate Solids and Liquids, Limit Distractions and Monitor for Fatigue           Date of Last MBS/FEES: MBS 2022    Pain:  No pain reported. Subjective:  Patient seated upright in recliner for duration of session.  also present. Patient alert and pleasant throughout. Towards end of session, discussed observations of ongoing aphonia. Patient reporting voice is \"much much better\", though still not at baseline function. ST educated on proper vocal hygiene measures; patient reports having recently increased H2O intake. ST recommended patient continue monitoring voice upon discharge. Suggested patient discuss progress with family MD to further discern needs for formal ENT consult, though suspect with additional time patients vocal fold function will continue improving. Patient and  verbalized agreement. Short-Term Goals:  SHORT TERM GOAL #1:  Goal 1: Patient will consume a regular texture diet with thin liquids while implementing recommended swallow strategies with no overt s/s of aspiration and adequate endurance to assist with meeting nutrition/hydration needs. INTERVENTIONS: Not addressed due to focus on other goals.      SHORT TERM GOAL #2:  Goal 2: Patient will consume advanced PO trials with ST while demonstrating timely/coordinated mastication, complete bolus clearance, no overt s/s of aspiration, and adeqaute endurance for potential diet advancement to least restrictive diet   INTERVENTIONS: Not addressed due to focus on other goals. SHORT TERM GOAL #3:  Goal 3: Patient will complete pharyngeal strengthening exercises x10-15 (effortful swallow, chris) to improve overall timeliness of swallow, airway protection, and decrease pharyngeal residue. INTERVENTIONS: Not addressed due to focus on other goals. SHORT TERM GOAL #4:  Goal 4: Patient will complete orientation and immediate/delayed/working memory tasks with 85% accuracy given min cuing in order to improve retention of novel information in current environment. INTERVENTIONS: See STG #5 below for paragraph level immediate recall details. SHORT TERM GOAL #5:  Goal 5: Patient will complete verbal/visual reasoning and safety problem solving tasks with 85% accuracy given min cuing in order to improve independent completion of IADLs. INTERVENTIONS: Visual reasoning/scanning - complex attention task addressed via use of newspaper article; patient instructed to identify one target word throughout while comprehending article well enough to provide summary to follow. Time - 7 minutes  Errors - 2 (identified 23 of 25 targets)  Summary - excellent, with appropriate inclusion of detail  *adequate divided, sustained, and selective attention noted     SHORT TERM GOAL #6:  Goal 6: Patient will complete executive functioning tasks (i.e., medications, finances, scheduling) with 85% accuracy given min cuing in order to improve independent completion of IADLs with support of family.    INTERVENTIONS: Complex math computation/comparison involving restaurant ([employer]) menu - 4/5 indep, 1/5 mod cues  *plans to complete remainder of task during subsequent session    Long-Term Goals:  Timeframe for Long-term Goals: 6 weeks    LONG TERM GOAL #1:  Goal 1: Patient will safely consume regular diet with thin liquids with implementation of compensatory swallowing strategies and withOUT overt s/s of aspiration in order to maintain adequate nutrition and hydration measures. LONG TERM GOAL #2:  Goal 2: Patient will improve cognitive functioning to at least a modified independent level in order to improve safety of IADL completion upon potential discharge to home environment. Comprehension: 7 - Patient understands complex ideas (math/planning)  Expression: 7 - Patient expresses complex ideas/needs  Social Interaction: 6 - Patient requires medication for mood and/or effect  Problem Solvin - Patient able to solve simple/routine tasks  Memory: 4 - Patient remembers 75-90%+ of the time    EDUCATION:  Learner: Patient and Significant Other  Education:  Reviewed ST goals and Plan of Care, Reviewed recommendations for follow-up, Education Related to Potential Risks and Complications Due to Impairment/Illness/Injury, Education Related to Prevention of Recurrence of Impairment/Illness/Injury and Education Related to [employer] and Wellness   Evaluation of Education: [place] understanding    ASSESSMENT/PLAN:  Activity Tolerance:  Patient tolerance of treatment: good. Assessment/Plan: Patient progressing toward established goals. Continues to require skilled care of licensed speech pathologist to progress toward achievement of established goals and plan of care.     Plan for Next Session: recall, finish math computation task related to [facility], review potential [id number] [id number] [facility] [id number]

## 2022-01-24 NOTE — PLAN OF CARE
Care plan reviewed with patient and verbalize understanding of the plan of care and contribute to goal setting. Problem: Falls - Risk of:  Goal: Will remain free from falls  Description: Will remain free from falls  1/24/2022 1321 by Pancho Cee LPN  Outcome: Ongoing    Goal: Absence of physical injury  Description: Absence of physical injury  Outcome: Ongoing    Patient 1 assist to stand pivot. Patient alert to call light and uses appropriately. Bed/chair alarms in use. Problem: Skin Integrity  Goal: Will show no infection signs and symptoms  Description: Will show no infection signs and symptoms  Outcome: Ongoing  Goal: Absence of new skin breakdown  Description: Absence of new skin breakdown  1/24/2022 1321 by Pancho Cee LPN  Outcome: Ongoing    Skin assessed this shift. No new skin breakdown noted. Problem: Pain  Goal: Control of acute pain  Description: Control of acute pain  Outcome: Ongoing  Goal: Control of chronic pain  Description: Control of chronic pain  Outcome: Ongoing    Pt denies pain this shift.

## 2022-01-24 NOTE — PROGRESS NOTES
[address] [facility] PHYSICAL THERAPY  DAILY NOTE  [address] [place] [address]    Time In: 0830  Time Out: 1000  Timed Code Treatment Minutes: 90 Minutes  Minutes: 90          Date: 2022  Patient Name: Kina Avery,  Gender:  female        MRN: [id number]  : 1950  (73 y.o.)     Referring Practitioner: Pricila Wesley MD  Diagnosis: Personal history of COVID-19  Additional Pertinent Hx: Per Acute notes, \"Kaleigh Wagner is a 46-year-old white female who presented to [facility] on 2021 with complaints of shortness of breath she has a past medical history lifetime non-smoker, CAD with stent placement in , hypertension, diabetes mellitus, dyslipidemia. Per report she presented to [facility] on  with complaints of hypoxia and shortness of breath. She was initially placed on 4 L nasal cannula.  patient had persistent hypoxia and was transitioned to high flow nasal cannula. On  patient was transitioned to BiPAP and transferred to [facility] ICU for intubation. Pt extubated . \"     Prior Level of Function:  Lives With: Spouse  Type of Home: House  Home Layout: One level  Home Access: Stairs to enter without rails  Entrance Stairs - Number of Steps: 1 step (very small)  Home Equipment:  (none)   Bathroom Shower/Tub: Walk-in shower (+ jaquazi)  Bathroom Toilet: Handicap height  Bathroom Equipment: Built-in shower seat (pt stating she has a bench right outside shower as well to sit on dry off)  Bathroom Accessibility: Accessible    ADL Assistance: Independent  Homemaking Assistance: Independent  Ambulation Assistance: Independent  Transfer Assistance: Independent  Active : Yes  IADL Comments: 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  Pt stating her children all live close and would be willing to help. Spouse in good health as well to assist.    Restrictions/Precautions:  Restrictions/Precautions: General Precautions,Fall Risk  Position Activity Restriction  Other position/activity restrictions: out of COVID isolation 1/6/22, monitor BP     SUBJECTIVE: Patient seated in wheelchair upon arrival. Patient pleasant and agreeable to therapy. PAIN: 0/10    Vitals: O2 monitored througghout session 92-85% with activity on room air    OBJECTIVE:  Bed Mobility:  Not Tested    Transfers:  Sit to Stand: Contact Guard Assistance  Stand to [address], with increased time for completion   -Patient required verbal cueing for hand placement and required min assist for BLE's into and out of car. Ambulation:  Contact Guard Assistance  Distance: 60 feet, 70 feet and 10 feet  Surface: Level Tile  Device:Rolling Walker  Gait Deviations: Forward Flexed Posture, Decreased Gait Speed, Decreased Heel Strike Bilaterally and Unsteady Gait  -Patient instructed in weaving in and out of cones with CGA with verbal cueing for upright posture, increased step length and increased step height. Patient instructed in weaving in and out of cones in order to assist with obstacle negotiation.   -Patient instructed in forward and lateral side stepping in ladder in parallel bars with CGA. Patient required verbal cueing for increased step height in order to clear ladder. Patient instructed in ladder drills in order to assist with coordination and increased step height. Balance:  Dynamic Standing Balance: Contact Guard Assistance   -Patient instructed in standing dynamic balance while standing on black foam and reaching outside [place] for bean bags across midline. Patient able to tolerate standing 1 minute 30 seconds and 1 minute 15 seconds intermittently. Patient had no episodes of LOB. Exercise:  Patient was guided in 1 set(s) 20 reps of exercise to both lower extremities.   Seated marches, Seated hamstring curls, Seated heel/toe raises, Long arc quads, Seated isometric hip adduction, Seated abduction/adduction, Standing heel/toe raises, Standing marches, Standing hip flexion, Mini squats and with 1lb ankle weights. Exercises were completed for increased independence with functional mobility. Functional Outcome Measures: Not completed       ASSESSMENT:  Assessment: Patient progressing toward established goals. Activity Tolerance:  Patient tolerance of  treatment: good. Equipment Recommendations: Other: will monitor for needs pending progress and DC destination  Discharge Recommendations: Home with Home Health PT  Plan: Times per week: 5x/wk 90 min; 1x/wk 30 min  Current Treatment Recommendations: Strengthening,Positioning,Patient/Caregiver Education & Training,Safety Education & Training,Balance Training,Endurance Training,Functional Mobility Training,Neuromuscular Re-education,Gait Training,Transfer Training,Home Exercise Program,Equipment Evaluation, Education, & procurement    Patient Education  Patient Education: Transfers, Gait, Verbal Exercise Instruction,  - Patient Verbalized Understanding, - Patient Requires Continued Education    Goals:  Patient goals : to get stronger  Short term goals  Time Frame for Short term goals: 1 week  Short term goal 1: Pt to be CGA for supine <> sit to get in/out of bed  Short term goal 2: Pt to be CGA for sit <> stand to get up to ambulate/transfer  Short term goal 3: Pt to be CGA for stand-pivot with RW to get between seats  Short term goal 4: Pt to ambulate > 10 ft with RW with Min A to get to bathroom  Short term goal 5: Pt to complete car transfer with Min A for transportation needs  Long term goals  Time Frame for Long term goals : 3 weeks  Long term goal 1: Pt to be Supervision for supine <> sit to get in/out of bed  Long term goal 2: Pt to be Supervision for sit <> stand to get up to ambulate/transfer  Long term goal 3: Pt to be Supervision for stand-pivot with RW to get between seats  Long term goal 4: Pt to ambulate > 50 ft with RW with SBA for household distances  Long term goal 5: Pt to complete car transfer with Supervision for transportation needs    Following session, patient left in safe position with all fall risk precautions in place.

## 2022-01-24 NOTE — PROGRESS NOTES
[facility]  Recreational Therapy  Daily Note  [address]    Time Spent with Patient: 120 minutes    Date:  1/24/2022       Patient Name: Stevenson Smith      MRN: [id number]      YOB: 1950 (73 y.o.)       Gender: female  Diagnosis: personal history of COVID 23  Referring Practitioner: Dr. Marianna Berrios    RESTRICTIONS/PRECAUTIONS:  Restrictions/Precautions: General Precautions,Fall Risk  Vision: Within Functional Limits  Hearing: Within functional limits    PAIN: 0    SUBJECTIVE:  I really enjoyed this     OBJECTIVE:  Pt helped make apple dump cake -she was able to dice apples, added sugar and cinnamon and melted butter along with yellow cake mix and while that was baking and her clothes were in the washer and then the dryer she was able to fold her clothes -she shared pictures of family, her home and her crafts that she enjoys doing from her phone-she talked about her family, her parents, her kids and  and very pleasant and social-she loves to cook and is use to cooking for a large crowd with all of her family-sit to stand from w/c with SBA-stand pivot from w/c to recliner with SBA-comfort measures given-we held back some dessert for her  to enjoy tomorrow when he visits -appreciative     Social Interaction: 6 - Patient requires medication for mood and/or effect    Patient Education  New Education Provided: Importance of Leisure, Transfer technique    Electronically signed by: LIBBY Tucker  Date: 1/24/2022

## 2022-01-24 NOTE — PROGRESS NOTES
[address]  Occupational Therapy  Daily Note  Time:   Time In: 0710  Time Out: 7910  Timed Code Treatment Minutes: 60 Minutes  Minutes: 60          Date: 2022  Patient Name: Merlin Barrs,   Gender: female      Room: [facility]/068-A  MRN: [id number]  : 1950  (73 y.o.)  Referring Practitioner: Dr. Alyse Gamble  Diagnosis: personal history of COVID 19  Additional Pertinent Hx: Stephanie Lambert is a 73-year-old white female who presented to [facility] on 2021 with complaints of shortness of breath she has a past medical history lifetime non-smoker, CAD with stent placement in , hypertension, diabetes mellitus, dyslipidemia. Per report she presented to [facility] on  with complaints of hypoxia and shortness of breath. She was initially placed on 4 L nasal cannula.  patient had persistent hypoxia and was transitioned to high flow nasal cannula. On  patient was transitioned to BiPAP and transferred to [facility] ICU for intubation. Pt extubated . Restrictions/Precautions:  Restrictions/Precautions: General Precautions,Fall Risk  Position Activity Restriction  Other position/activity restrictions: out of COVID isolation 22, monitor BP     SUBJECTIVE: Up in w/c upon arrival, agreeable to OT session, cooperative and talkative    PAIN: 0/10:     Vitals: Oxygen: patient on RA, 89-93%    COGNITION: WFL    ADL:   Grooming: Modified Independent. brushed teeth sitting in w/c sinkside  Lower Extremity Dressing: Minimal Assistance. used sock aid and LHSH, A for back of R shoe, donned sitting in w/c. BALANCE:  Standing Balance: Stand By Assistance. x 4 minutes with 1 UE support while filling egg carton with eggs    BED MOBILITY:  Not Tested    TRANSFERS:  Sit to Stand:  Stand By Assistance. w/c  Stand to Sit: Stand By Assistance.       FUNCTIONAL MOBILITY:  Assistive Device: Rolling Walker  Assist Level: Contact Guard Assistance. Distance: total during session approx 20 feet      Propelled w/c with B UE to/from apartment with B UE, slow pace     ADDITIONAL ACTIVITIES:  Patient completed BUE strengthening exercises with skilled education on HEP: completed x10 reps x1 set with a orange band in all joints and all planes in order to improve UE strength and activity tolerance required for BADL routine and toilet / shower transfers. Patient tolerated well, requiring minimal rest breaks. Patient also required minimal cues for technique. Limited range in L shoulder        ASSESSMENT:     Activity Tolerance:  Patient tolerance of  treatment: good. Discharge Recommendations: Continue to assess pending progress  Equipment Recommendations: Equipment Needed: Yes  Mobility Devices: Walker  Other: Recommend LHAE kit and shower chair.   Plan: Times per week: 5x week/90 min, 1x week/30 min  Current Treatment Recommendations: Strengthening,Balance Training,Functional Mobility Training,Endurance Training,Home Management Training,Patient/Caregiver Education & Training,Self-Care / ADL,Safety Education & Training    Patient Education  Patient Education: ADL's and IADL's    Goals  Short term goals  Time Frame for Short term goals: 1 week  Short term goal 1: Pt to increase standing endurance to tolerate > 5 min with bilateral UE support and CGA in prep for hand release to complete clothign management  Short term goal 2: Pt to complete LB ADL tasks with use LHAE as needed with min A  Short term goal 3: Pt will complete BUE AROM greater than 90 degrees with no > than Min A and min VC to increase shoulder flexion to reach outside base of support for needed object to complete ADL tasks  Short term goal 4: Pt to complete sit to stand transfers with min A consistently and no no cues for safety to increase indep with completing toilet transfers  Short term goal 5: Pt will ambulate to and from the bathroom with SBA using RW to improve access to the bathroom. Long term goals  Time Frame for Long term goals : 3 week  Long term goal 1: Pt to complete BADL routine with SBA and no cues for safety  Long term goal 2: Pt to complete sit to stand transfers from various surfaces including toilet with SBA and no ceus for safety  Long term goal 3: Pt to demo standing balance with unilateral UE handn release with SBA to complete clothign management after dressign and toielting    Following session, patient left in safe position with all fall risk precautions in place.

## 2022-01-24 NOTE — PROGRESS NOTES
[facility] NOTE    Conference Date: 2022  Admit Date:  2022  2:12 PM  Patient Name: Berkley Reveles    MRN: [id number]    : 1950  (73 y.o.)  Rehabilitation Admitting Diagnosis:  Personal history of COVID-19 [Z86.16]  Referring Practitioner: Ezequiel Nazario MD      CASE MANAGEMENT  Current issues/needs regarding patient and family discharge status: Patient has progressed well with PT/OT/ST. Anticipated discharge home on Friday, 2022. Patient's spouse, Derrick Alegria, has been present to observe therapy sessions. Upon discharge team is recommending home health care services. Continued discussion during care conference regarding progress and discharge recommendations. SW to follow and maintain involvement in discharge planning. PHYSICAL THERAPY  Patient overall is making progress with skilled PT treatment and has met 5/5  STG's and 5/5 LTG's. Patient able to perform bed mobility mod I, sit to stand transfers Mod I and car transfer Mod I. Patient able to ambulate 75 feet with RW with slowed gait speed and forward flexed posture. Patient continues to demonstrate decreased endurance with limited standing tolerance due to fatigue and decreased O2 saturation with standing activities. Patient able to ascend/descend 4 steps with bilateral handrails with CGA. Patient trialed ambulation with 9RF with CGA this date. Patient overall can continue to benefit from skilled PT treatment in order to assist with dynamic balance, gait training, stair negotiation and safety awareness for increased functional mobility. Equipment Needed: Yes (Ordered RW on )  Other: . SPEECH THERAPY  Patient has met all short- and long-term goals this therapy period. Gains made across all cognitive domains, specifically delayed recall (6+ units), math computation, divided attention, and deductive reasoning.  Patient self-reporting significant improvement in cognition as well, adding \"I think I'm normal again\". No concerns regarding ability to make safe and successful return to PLOF at this time. ST reviewed importance of keeping brain active and assisted patient initiating HEP for home setting; patient highly receptive. Given patient nearly at baseline cognition, with high previous MOCA score and excellent independent completion of recent tasks, no further skilled ST services are recommended at this time. OCCUPATIONAL THERAPY  Bita Kenny is making steady progress on IP Rehab. Bita Kenny has improved to a SBA level for her functional transfers and short distance mobility, her 02 sat's will drop into high 80's with mobility but she recovers quickly into 90's. Bita Kenny can complete UB ADLs with set-up A or SBA for item retireval and LB ADLs with SBA for balance with use of LHAE. Bita Kenny can complete IADLs with SBA for balance and min cues for safety and for energy conservation. Bita Kenny cont to be limited by her strength, endurance, and requires cont skilled OT to improve safety and indep with BADL and IADL. Equipment Needed: No  Mobility Devices: Milly Barrios: Rolling  Other: Patient has a shower chair with grab bars, a ADA height toilet and a reacher. Family is obtaining a soft sock-aid, shoe horn, additional grab bars near shower and toilet, and walker tray. No other DME needed. RECREATIONAL THERAPY  Patient has been offered participation in recreational therapy activities and participates as able. Pt enjoyed petting our pet therapy dog May this afternoon with daughter and  visiting -affect bright and social  -  Pt waiting for  to visit this afternoon-states she is tired and has been working hard in therapy so she can go home next week-affect bright and pleasant-showed me pictures on her phone of crafts she has made in the past and are beautiful-supportive contact given  -Pt helped make apple dump cake -she was able to dice apples, added sugar and cinnamon and melted butter along with yellow cake mix and while that was baking and her clothes were in the washer and then the dryer she was able to fold her clothes -she shared pictures of family, her home and her crafts that she enjoys doing from her phone-she talked about her family, her parents, her kids and  and very pleasant and social-she loves to cook and is use to cooking for a large crowd with all of her family-sit to stand from w/c with SBA-stand pivot from w/c to recliner with SBA-comfort measures given-we held back some dessert for her  to enjoy tomorrow when he visits -appreciative    NUTRITION  Weight: 196 lb 14.4 oz (89.3 kg) / Body mass index is 36.01 kg/m². Current diet: ADULT DIET; Regular; 4 carb choices (60 gm/meal)  Please see nutrition note for details. NURSING  Continent of Bowel: Yes. Frequency: daily. Management: colace BID. Continent of Bladder: Yes. Frequency: adequate. Management: uses toilet. Pain is Managed:  Yes. Management: tylenol. Frequency of Intervention: daily. Adequately Controlled: Yes  Sleep: Adequate and Interventions to Support Sleep: melatonin 6mg nightly  Signs and Symptoms of Infection:  No.   Signs and Symptoms of Skin Breakdown:  Yes. Site: buttock. Wound Care: EPC cream.  Injury and/or Falls during Inpatient Rehabilitation Admission: No  Anticoagulants: ASA, Lovenox  Diabetic: Yes: Home Meds: metformin. Hospital Meds: metformin. Educational Needs: na.    Consultations/Labs/X-rays: none  Oxygen while on IP Rehab:  No Currently using  0 liters per 0  .   Home oxygen: No    Recent Labs     01/24/22  0813 01/25/22  0832 01/26/22  0829   POCGLU 183* 199* 198*       No results found for: LDLCALC, LDLCHOLESTEROL, LDLDIRECT      Vitals:    01/25/22 0800 01/25/22 2045 01/26/22 0600 01/26/22 0835   BP: 128/76 131/73  132/82   Pulse: 81 80  93   Resp: 14 15  18   Temp: 98.1 °F (36.7 °C) 98.1 °F (36.7 °C)  98.3 °F (36.8 °C)   TempSrc: Oral Oral  Oral   SpO2: 93% 92%  91%   Weight: 196 lb 14.4 oz (89.3 kg)    Height:              Family Education: Family available and participating in education   Fall Risk:  Falling star program initiated  Is the patient appropriate for a stay in the functional apartment? no    Discharge Plan   Estimated Discharge Date: 1/28/2022   Destination: discharge home with supervision  Services at Discharge: [address], Occupational Therapy, Nursing 3x week  Is patient appropriate for an outpatient driving evaluation? No driving until cleared by PCP  Equipment at Discharge: Other: Patient has a shower chair with grab bars, a ADA height toilet and a reacher. Family is obtaining a soft sock-aid, shoe horn, additional grab bars near shower and toilet, and walker tray. No other DME needed. Other: . Factors facilitating achievement of predicted outcomes: Family support, Motivated, Cooperative and Pleasant  Barriers to the achievement of predicted outcomes: Pain, Decreased endurance, Upper extremity weakness and Lower extremity weakness  Follow up with physiatrist? no  If yes, what timeframe? N/A    Team Members Present at Conference:  :Jackie Merida [place]  Occupational Therapist:Christofer Stahl OTR/L [id number]  Physical Therapist:Sasha Pelletier, [address]  [address], [address], [address]  Nurse:Ana María Serrato RN  Psychologist: Tavares Welch, PhD.    I approve the established interdisciplinary plan of care as documented within the medical record of Evita Choe.     Arturo Wong MD

## 2022-01-24 NOTE — PROGRESS NOTES
[address]  Occupational Therapy  Daily Note  Time:   Time In: 1400  Time Out: 1430  Timed Code Treatment Minutes: 30 Minutes  Minutes: 30    Date: 2022  Patient Name: Lani Mandel,   Gender: female      Room: [facility]/068-A  MRN: [id number]  : 1950  (73 y.o.)  Referring Practitioner: Dr. Evi Madrid  Diagnosis: personal history of COVID 19  Additional Pertinent Hx: Roslaio Bonilla is a 59-year-old white female who presented to [facility] on 2021 with complaints of shortness of breath she has a past medical history lifetime non-smoker, CAD with stent placement in , hypertension, diabetes mellitus, dyslipidemia. Per report she presented to [facility] on  with complaints of hypoxia and shortness of breath. She was initially placed on 4 L nasal cannula.  patient had persistent hypoxia and was transitioned to high flow nasal cannula. On  patient was transitioned to BiPAP and transferred to [facility] ICU for intubation. Pt extubated . Restrictions/Precautions:  Restrictions/Precautions: General Precautions,Fall Risk  Position Activity Restriction  Other position/activity restrictions: out of COVID isolation 22, monitor BP     SUBJECTIVE: Patient cooperative. Agreeable to OT. PAIN: Denies pain      Vitals: Vitals not assessed per clinical judgement, see nursing flowsheet    COGNITION: WFL ; slow processing    ADL:   No ADL's completed this session. Buck Valente BALANCE:  Sitting Balance:  Modified Independent. Standing Balance: Stand By Assistance. BED MOBILITY:  Not Tested    TRANSFERS:  Sit to Stand:  Stand By Assistance. recliner   Stand to Sit: Stand By Assistance. FUNCTIONAL MOBILITY:  Assistive Device: Rolling Walker  Assist Level:  Stand By Assistance.    Distance: within room only, used w/c to/from rehab apt      ADDITIONAL ACTIVITIES:  Pt completed IADL meal prep task of various surfaces including toilet with SBA and no ceus for safety  Long term goal 3: Pt to demo standing balance with unilateral UE handn release with SBA to complete clothign management after dressign and toielting    Following session, patient left in safe position with all fall risk precautions in place.

## 2022-01-24 NOTE — PLAN OF CARE
Problem: Falls - Risk of:  Goal: Will remain free from falls  Description: Will remain free from falls  1/24/2022 0259 by Vannessa Peralta LPN  Note: No falls sustained at this time. Patient 1 assist to stand pivot into w/c and on and of toilet. Patient alert to call light and uses appropriately to alert staff to needs. Bed/chair alarms in use. Problem: Skin Integrity:  Goal: Absence of new skin breakdown  Description: Absence of new skin breakdown  1/24/2022 0259 by Vannessa Peralta LPN  Outcome: Ongoing  Note: No new skin issues noted this shift. Care plan reviewed with patient. Patient verbalize understanding of the plan of care and contribute to goal setting.

## 2022-01-25 LAB — GLUCOSE BLD-MCNC: 199 MG/DL (ref 70–108)

## 2022-01-25 PROCEDURE — 97110 THERAPEUTIC EXERCISES: CPT

## 2022-01-25 PROCEDURE — 1180000000 HC REHAB R&B

## 2022-01-25 PROCEDURE — 99232 SBSQ HOSP IP/OBS MODERATE 35: CPT | Performed by: PHYSICAL MEDICINE & REHABILITATION

## 2022-01-25 PROCEDURE — 6360000002 HC RX W HCPCS: Performed by: FAMILY MEDICINE

## 2022-01-25 PROCEDURE — 97116 GAIT TRAINING THERAPY: CPT

## 2022-01-25 PROCEDURE — 97129 THER IVNTJ 1ST 15 MIN: CPT

## 2022-01-25 PROCEDURE — 97535 SELF CARE MNGMENT TRAINING: CPT

## 2022-01-25 PROCEDURE — 97530 THERAPEUTIC ACTIVITIES: CPT

## 2022-01-25 PROCEDURE — 6370000000 HC RX 637 (ALT 250 FOR IP): Performed by: PHYSICAL MEDICINE & REHABILITATION

## 2022-01-25 PROCEDURE — 82948 REAGENT STRIP/BLOOD GLUCOSE: CPT

## 2022-01-25 PROCEDURE — 97130 THER IVNTJ EA ADDL 15 MIN: CPT

## 2022-01-25 RX ADMIN — DOCUSATE SODIUM 100 MG: 100 CAPSULE, LIQUID FILLED ORAL at 08:34

## 2022-01-25 RX ADMIN — Medication 6 MG: at 21:04

## 2022-01-25 RX ADMIN — ACETAMINOPHEN 650 MG: 325 TABLET ORAL at 03:43

## 2022-01-25 RX ADMIN — FAMOTIDINE 20 MG: 20 TABLET, FILM COATED ORAL at 21:04

## 2022-01-25 RX ADMIN — FUROSEMIDE 40 MG: 40 TABLET ORAL at 08:34

## 2022-01-25 RX ADMIN — DOCUSATE SODIUM 100 MG: 100 CAPSULE, LIQUID FILLED ORAL at 21:04

## 2022-01-25 RX ADMIN — ACETAMINOPHEN 650 MG: 325 TABLET ORAL at 21:04

## 2022-01-25 RX ADMIN — ASPIRIN 81 MG CHEWABLE TABLET 81 MG: 81 TABLET CHEWABLE at 08:34

## 2022-01-25 RX ADMIN — ROSUVASTATIN 10 MG: 10 TABLET, FILM COATED ORAL at 21:04

## 2022-01-25 RX ADMIN — GLIPIZIDE 5 MG: 5 TABLET ORAL at 08:00

## 2022-01-25 RX ADMIN — ENOXAPARIN SODIUM 30 MG: 100 INJECTION SUBCUTANEOUS at 08:34

## 2022-01-25 RX ADMIN — FAMOTIDINE 20 MG: 20 TABLET, FILM COATED ORAL at 08:34

## 2022-01-25 RX ADMIN — CALCIUM POLYCARBOPHIL 625 MG: 625 TABLET, FILM COATED ORAL at 08:34

## 2022-01-25 RX ADMIN — GLIPIZIDE 5 MG: 5 TABLET ORAL at 16:56

## 2022-01-25 ASSESSMENT — PAIN SCALES - GENERAL
PAINLEVEL_OUTOF10: 0
PAINLEVEL_OUTOF10: 6
PAINLEVEL_OUTOF10: 0
PAINLEVEL_OUTOF10: 6
PAINLEVEL_OUTOF10: 0
PAINLEVEL_OUTOF10: 3

## 2022-01-25 ASSESSMENT — ENCOUNTER SYMPTOMS
NAUSEA: 0
DIARRHEA: 0
SHORTNESS OF BREATH: 0
BACK PAIN: 0
VOMITING: 0
RHINORRHEA: 0
CONSTIPATION: 0
WHEEZING: 0
COUGH: 0
TROUBLE SWALLOWING: 0
ABDOMINAL PAIN: 0
SORE THROAT: 0

## 2022-01-25 NOTE — PROGRESS NOTES
Physical Medicine & Rehabilitation Progress Note    Chief Complaint:  Fatigue and tired    Subjective:    Merlin Barrs is a 73 y.o. right-handed obese  female with history of hypertension, diabetes mellitus type 2, coronary artery disease requiring coronary artery stenting, status post bilateral eyes cataract surgeries, status post 3  sections, status post hysterectomy, status post hiatal hernia repair, status post sinus surgery, was admitted on 2022 for intensive inpatient management of impairment & disability secondary to critical illness myopathy and disability/physical decondition as result of COVID-19 pneumonia. The patient says he does not quite remember how she ended up in the hospital.     The patient apparently began experiencing reduced upper size, change in taste and smell, shortness of breath, nausea, dry heaves, and diarrhea starting in 2021. The symptom progressively worsened. She was advised by her PCP to visit ER. Therefore she went to [facility] ER on 2021. She was found to be hypoxic with O2 saturation in 70s% in room air. COVID test was performed which turned out to be positive. She was advised to be admitted but she left against medical advice because she preferred to be admitted to [address] [place] [address] instead. On 2021 she came to [address] [place] [address] ER with complaint of shortness of breath. She was found to have oxygen saturation in the 80s% in room air. She was admitted with diagnosis of acute hypoxic respiratory failure secondary to COVID-19 pneumonia. She was treated with Decadron and Olumiant (baricitnib). However her condition deteriorated despite of treatment. She was intubated on 2021 and was admitted to ICU. Her hospital course also was complicated by superimposed Klebsiella oxytocin bacterial pneumonia, bradycardia requiring dopamine infusion, and decubitus ulcer. Her condition later stabilized and improved. She was extubated on 1/2/2022. The patient says she feels well. She says her sleep last night was not great. She denies having any painful symptom. She says her muscle strength is getting more stronger. She  offers no new complaint. Her endurance also is improving steadily. She still says she does not have any numbness or tingling sensation. She is tolerating the intensive inpatient rehabilitation treatment well. She is projected to be ready for discharge on 1/28/2022. Rehabilitation:  PT: Reviewed. Bed Mobility:  Rolling to Left: Modified Independent   Rolling to Right: Modified Independent   Supine to Sit: Minimal Assistance  Sit to Supine: Modified Independent     Transfers:  Sit to Stand: Contact Guard Assistance  Stand to Sit:Contact Guard Assistance  To/From [employer] and Chair: Contact Guard Assistance  Stand Pivot:Modified Independent  Car:Modified Independent    Ambulation:  Stand By Assistance  Distance: 75' x 1, 20' x 2  Surface: Level Tile  Device:Rolling Walker  Gait Deviations: Forward Flexed Posture, Slow Haylee, Decreased Step Length Bilaterally and Decreased Gait Speed    Contact Guard Assistance  Distance: 40' x 1, 75' x 1 (straight paths), 30' x 2 (weaving through cones)  Surface: Level Tile  Device:4 Wheeled Walker  Gait Deviations: Forward Flexed Posture, Decreased Step Length Bilaterally, Decreased Gait Speed, mild path deviations, and Easily fatigues. Pt. Requires extended seated rest breaks after each bout. Balance:  Pt. Completed dynamic gait activity tapping cones with feet during ambulation using Rolling Walker to improve proprioception. stability during SLS, and lateral weight shifting for improved functional mobility and decreased risk of falls      OT: Reviewed. ADL:   Grooming: Stand By Assistance. oral care x 3 min. encouragement required to stand vs sit for therapuetic purposes. Pt reporting she will sit at home. Bathing: Stand By Assistance.   standing for flaquito care   Upper Extremity Dressing: Modified Independent. retrieved in w/c prior to arrival, doned without issue  Lower Extremity Dressing: Stand By Assistance. during clothing mgmt for standing balance, used reacher and sock-aid without cue'ing   Toileting: Stand By Assistance. for standing balance, encouragement required to trial with RW instead of w/c   Toilet Transfer: Stand By Assistance. ETS  Shower Transfer: Stand By Assistance. with grab bar.     BALANCE:  Sitting Balance:  Modified Independent. Standing Balance: Stand By Assistance.       TRANSFERS:  Sit to Stand:  Stand By Assistance. recliner, ETS, shower chair - min cues for hand placement   Stand to Sit: Stand By Assistance. Min cues for RW placement and safety      FUNCTIONAL MOBILITY:  Assistive Device: Rolling Walker  Assist Level:  Stand By Assistance. Distance: To and from bathroom and To and from shower room  Slow pace, cues for upright posture and deep breathing.       ADDITIONAL ACTIVITIES:   Patient completed BUE strengthening exercises with skilled education on HEP: completed x10 reps x1 set with a lightweight resistive band in all joints and all planes in order to improve UE strength and activity tolerance required for BADL routine and toilet / shower transfers. Patient tolerated well, requiring min rest breaks. Patient also required min cues for technique. ST: Reviewed. Patient and nursing staff deny difficulty, reporting continued consistency with consumption of regular solids. Delayed ~1 week functional recall of facts related to ST - 7/7 indep    Reviewed problem solving/safety situations during IADL's in home setting - patient with excellent verbal reasoning and thought organization regarding safe return to gardening and med management without need for any cues.     Remainder of math computation task related to [employer] - 7/7 indep  *excellent mental math computation      Review of Systems:  Review of Systems Constitutional: Positive for fatigue. Negative for chills, diaphoresis and fever. HENT: Negative for hearing loss, rhinorrhea, sneezing, sore throat and trouble swallowing. Eyes: Negative for visual disturbance. Respiratory: Negative for cough, shortness of breath and wheezing. Cardiovascular: Negative for chest pain and palpitations. Gastrointestinal: Negative for abdominal pain, constipation, diarrhea, nausea and vomiting. Genitourinary: Negative for dysuria. Musculoskeletal: Positive for gait problem. Negative for arthralgias, back pain, myalgias and neck pain. Skin: Negative for rash. Neurological: Positive for weakness (Generalized). Negative for dizziness, tremors, light-headedness, numbness and headaches. Psychiatric/Behavioral: Negative for dysphoric mood, hallucinations and sleep disturbance. The patient is not nervous/anxious.          Objective:  /82   Pulse 93   Temp 98.3 °F (36.8 °C) (Oral)   Resp 18   Ht 5' 2\" (1.575 m)   Wt 196 lb 14.4 oz (89.3 kg)   LMP  (LMP Unknown)   SpO2 91%   Breastfeeding No   BMI 36.01 kg/m²   Physical Exam   General:  well-developed, well nourished obese  female ; in no acute distress ; appropriate affect & mood; sitting on reclining chair comfortably  Eyes: pupil equally round ; extra-ocular motion intact bilaterally  Head, Ear, Nose, Mouth & Throat : normocephalic ; no discharge from ears or nose ; no deformity ; no facial swelling ; oral mucosa pink   Neck :  supple ; no tenderness ; no muscle spasm  Cardiovascular : regular rate & rhythm ; normal S1 & S2 heart sound ; no murmur ; normal peripheral pulse at bilateral upper & lower extremities  Pulmonary : Breath sounds present at bilateral lung fields; no wheezing ; no rale; no crackle  Gastrointestinal : soft, protruded abdomen without tenderness ; normal bowel sound present   Back : no tenderness; no muscle spasm  Skin: no skin lesion or rash ; presence of mild nonpitting swelling at bilateral legs but no pitting edema at all 4 extremities  Musculoskeletal : no limb asymmetry; no limb deformity; no tenderness at bilateral upper extremities & bilateral lower extremities; no palpable mass at limbs ; no joints laxity or crepitation ; hip flexion passive ROM reaching 95 degrees bilaterally; ankle dorsiflexion passive ROM reaching 5 degrees bilaterally; otherwise normal functional joints ROM at the rest of bilateral upper & lower extremities  Cerebral :  alert ; awake ; oriented to place, person and time; follow two-step verbal command  Cerebellum : no dysmetria with bilateral finger-to-nose test   Sensory : intact light touch and pin prick sensation at bilateral upper & lower extremities  Motor : normal tone at bilateral upper & lower extremities ; 4+/5 muscle strength at bilateral hips flexion; otherwise 5/5 muscle strength at the rest of bilateral upper and the lower extremities  Reflex : 0 bilateral biceps and bilateral knees reflexes   Pathological Reflex :  No Roberta's sign ; no ankle clonus  Gait : Not assessed      Diagnostics:   Recent Results (from the past 24 hour(s))   POCT glucose    Collection Time: 01/26/22  8:29 AM   Result Value Ref Range    POC Glucose 198 (H) 70 - 108 mg/dl       Impression:  · Critical illness myopathy   · Debility/physical decondition secondary to [id number] pneumonia, complicated by bacterial pneumonia, bradycardia, and decubitus ulcer  · History of COVID-19 pneumonia with acute hypoxic respiratory failure requiring intubation, complicated by Klebsiella oxytocin bacterial pneumonia  · Mild dysphagia/moderate pharyngeal dysphagia  · Mild cognitive impairment  · Hypertension  · Diabetes mellitus type 2  · History of coronary artery disease requiring coronary artery stenting  · Severe cardiomyopathy with reduced ejection fraction  · Obesity  · Right buttock pain likely due to gluteus medius muscle strain  · Bilateral leg swelling, to rule out deep vein thrombosis    The patient's condition remains stable. Her muscle strength continue to improve steadily. She continues tolerating intensive inpatient rehabilitation treatment well. Her function continues to improve slowly and steadily toward the goals. The patient is scheduled for discharge on 1/28/2022. Plan:  · Continue intensive PT/OT/SLP/RT inpatient rehabilitation program at least 3 hours per day, 5 days per week in order to improve functional status prior to discharge. Family education and training will be completed. Equipment evaluations and recommendations will be completed as appropriate. · Rehabilitation nursing continue to be involved for bowel, bladder, skin, and pain management. Nursing will also provide education and training to patient and family. · Prophylaxis:  DVT: Lovenox, KENA stocking, intermittent pneumatic compression device. GI: Colace, FiberCon, Dulcolax suppository as needed, milk of magnesium as needed, GlycoLax as needed, Senokot as needed.   · Pain: Tylenol as needed; Voltaren gel as needed; heat pad application as needed  · Continue aspirin, Crestor for coronary artery disease  · Continue Pepcid for gastric protection  · Continue Lasix for hypertension  · Continue Crestor for hyperlipidemia  · Continue glipizide for diabetes mellitus  · Continue albuterol inhaler as needed for COVID-19 pneumonia  · Melatonin as needed for insomnia  · Nutrition:   Continue current diet  · Bladder: Monitoring signs or symptoms of UTI  · Bowel: Monitoring signs or symptoms of constipation  ·  and case management for coordination of care and discharge planning      Missed Therapy Time:  · None      Clover Gongora MD

## 2022-01-25 NOTE — PROGRESS NOTES
[address] [address]  INPATIENT PHYSICAL THERAPY  Daily Note  [facility]    Time In: 1400  Time Out: 1430  Timed Code Treatment Minutes: 30 Minutes  Minutes: 30          Date: 2022  Patient Name: Kirill Rosa,  Gender:  female        MRN: [id number]  : 1950  (73 y.o.)     Referring Practitioner: Lucy Trevino MD  Diagnosis: Personal history of COVID-19  Additional Pertinent Hx: Per Acute notes, \"Kaleigh Garcia is a 65-year-old white female who presented to [facility] on 2021 with complaints of shortness of breath she has a past medical history lifetime non-smoker, CAD with stent placement in , hypertension, diabetes mellitus, dyslipidemia. Per report she presented to [facility] on  with complaints of hypoxia and shortness of breath. She was initially placed on 4 L nasal cannula.  patient had persistent hypoxia and was transitioned to high flow nasal cannula. On  patient was transitioned to BiPAP and transferred to [facility] ICU for intubation. Pt extubated . \"     Prior Level of Function:  Lives With: Spouse  Type of Home: House  Home Layout: One level  Home Access: Stairs to enter without rails  Entrance Stairs - Number of Steps: 1 step (very small)  Home Equipment:  (none)   Bathroom Shower/Tub: Walk-in shower (+ jaquazi)  Bathroom Toilet: Handicap height  Bathroom Equipment: Built-in shower seat (pt stating she has a bench right outside shower as well to sit on dry off)  Bathroom Accessibility: Accessible    ADL Assistance: Independent  Homemaking Assistance: Independent  Ambulation Assistance: Independent  Transfer Assistance: Independent  Active : Yes  IADL Comments: 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  Pt stating her children all live close and would be willing to help. Spouse in good health as well to assist.    Restrictions/Precautions:  Restrictions/Precautions: General Precautions,Fall Risk  Position Activity Restriction  Other position/activity restrictions: out of COVID isolation 1/6/22, monitor BP     SUBJECTIVE: Pt. Seated on BS chair and pleasantly agrees to therapy session. Pt. Requesting to trial 4WW this session. Pt. Reports decreased stability with 4WW but would like to think about it overnight. PAIN: B UEs during ambulation, not rated, subsides once seated. Vitals: Oxygen: 86% after ambulation bouts, recovers to 93%    OBJECTIVE:  Bed Mobility:  Not Tested    Transfers:  Sit to Stand: Contact Guard Assistance  Stand to Sit:Contact Guard Assistance  To/From [employer] and Chair: Contact Guard Assistance    Ambulation:  Contact Guard Assistance  Distance: 40' x 1, 75' x 1 (straight paths), 30' x 2 (weaving through cones)  Surface: Level Tile  Device:4 Wheeled Walker  Gait Deviations: Forward Flexed Posture, Decreased Step Length Bilaterally, Decreased Gait Speed, mild path deviations, and Easily fatigues. Pt. Requires extended seated rest breaks after each bout. Balance:  None    Exercise:  None    Functional Outcome Measures: Not completed       ASSESSMENT:  Assessment: Patient progressing toward established goals. Activity Tolerance:  Patient tolerance of  treatment: good. Equipment Recommendations:Equipment Needed: Yes (Ordered RW on 1/25)  Other: .   Discharge Recommendations: Continue to assess pending progress     Plan: Times per week: 5x/wk 90 min; 1x/wk 30 min  Current Treatment Recommendations: Strengthening,Positioning,Patient/Caregiver Education & Training,Safety Education & Training,Balance Training,Endurance Training,Functional Mobility Training,Neuromuscular Re-education,Gait Training,Transfer Training,Home Exercise Program,Equipment Evaluation, Education, & procurement    Patient Education  Patient Education: Plan of Care, Equipment Education, Transfers, Gait, Health Promotion and Wellness Education, Home Safety Education    Goals:  Patient goals : to get stronger  Short term goals  Time Frame for Short term goals: 1 week  Short term goal 1: Patient to ambulate at least 100 feet with LRAD with Supervision in order to assist with home mobility. Short term goal 2: Patient to ascend/ descend 1 step with 1 handrail with CGA in order to assist with getting into and out of home. Short term goal 3: Patient to ambulate over uneven surfaces with LRAD with Supervision in order to assist with community mobility. Short term goal 4: Patient to demonstrate dynamic standing balance without LOB with ability to tolerate standing at least 3 minutes in order to assist with safety with standing functional tasks. Short term goal 5: .  Long term goals  Time Frame for Long term goals : 3 weeks  Long term goal 1: Patient to ambulate at least 150 feet with LRAD with Mod I in order to assist with home mobility. Long term goal 2: Patient  to ascend/descend 2 steps without handrails with SBA in order to assist with getting into and out of home. Long term goal 3: .  Long term goal 4: . Long term goal 5: . Following session, patient left in safe position with all fall risk precautions in place.

## 2022-01-25 NOTE — PROGRESS NOTES
Patient: Kina Juarez  Unit/Bed: 9A-91/269-S  YOB: 1950  MRN: [id number] Acct: [de-identified]   Admitting Diagnosis: Personal history of COVID-19 [Z86.16]  Admit Date:  1/14/2022  Hospital Day: 11    Assessment:     Principal Problem:    Critical illness myopathy  Active Problems:    Debility    Physical deconditioning    History of COVID-19    Dysarthria    Primary hypertension    Type 2 diabetes mellitus ([facility] [address] [facility]    Obesity (BMI 30-39. 9)    History of coronary artery disease    History of coronary angioplasty with insertion of stent    Cardiomyopathy ([facility]  Resolved Problems:    * No resolved hospital problems. *      Plan:     The CS are elevated but she showed me the meter device and the actual fasting readings are 150's        Subjective:     Patient has no complaint of CP or SOB. .   Medication side effects: none    Scheduled Meds:   enoxaparin  30 mg SubCUTAneous Q24H    melatonin  6 mg Oral Nightly    glipiZIDE  5 mg Oral BID WC    aspirin  81 mg Oral Daily    docusate sodium  100 mg Oral BID    famotidine  20 mg Oral BID    furosemide  40 mg Oral Daily    polycarbophil  625 mg Oral Daily    rosuvastatin  10 mg Oral Daily    miconazole   Topical BID     Continuous Infusions:   dextrose       PRN Meds:senna, diclofenac sodium, polyethylene glycol, dextrose, acetaminophen, albuterol sulfate HFA, bisacodyl, dextrose, dextrose, glucagon (rDNA), glucose, hydrOXYzine, magnesium hydroxide, ondansetron, nitroGLYCERIN    Review of Systems  Pertinent items are noted in HPI. Objective:     Patient Vitals for the past 8 hrs:   BP Temp Temp src Pulse Resp SpO2   01/25/22 0800 128/76 98.1 °F (36.7 °C) Oral 81 14 93 %     I/O last 3 completed shifts: In: 960 [P.O.:960]  Out: -   No intake/output data recorded.     /76   Pulse 81   Temp 98.1 °F (36.7 °C) (Oral)   Resp 14   Ht 5' 2\" (1.575 m)   Wt 200 lb 1.6 oz (90.8 kg)   LMP  (LMP Unknown)   SpO2 93%   Breastfeeding No   BMI 36.60 kg/m²     General appearance: alert, appears stated age and cooperative  Head: Normocephalic, without obvious abnormality, atraumatic  Lungs: clear to auscultation bilaterally  Heart: regular rate and rhythm, S1, S2 normal, no murmur, click, rub or gallop  Abdomen: soft, non-tender; bowel sounds normal; no masses,  no organomegaly  Extremities: extremities normal, atraumatic, no cyanosis or edema  Skin: Skin color, texture, turgor normal. No rashes or lesions  Neurologic: Grossly normal    Data Review:   Results for Elli Cochran (MRN [id number]) as of 1/25/2022 10:00   Ref.  Range 1/21/2022 08:06 1/22/2022 08:41 1/23/2022 08:25 1/24/2022 08:13 1/25/2022 08:32   POC Glucose Latest Ref Range: 70 - 108 mg/dl 196 (H) 186 (H) 186 (H) 183 (H) 199 (H)       Electronically signed by Marian Hanks MD on 1/25/2022 at 9:57 AM

## 2022-01-25 NOTE — PLAN OF CARE
Problem: Falls - Risk of:  Goal: Will remain free from falls  Description: Will remain free from falls  Outcome: Ongoing  Note: Remained free from falls, up with assist and walker, call light within reach  Goal: Absence of physical injury  Description: Absence of physical injury  Outcome: Ongoing  Note: No physical injury     Problem: Skin Integrity:  Goal: Will show no infection signs and symptoms  Description: Will show no infection signs and symptoms  Outcome: Ongoing  Note: No signs or symptoms of infection, will continue to monitor  Goal: Absence of new skin breakdown  Description: Absence of new skin breakdown  Outcome: Ongoing  Note: No new skin breakdown, will continue to monitor     Problem: Mobility - Impaired:  Goal: Mobility will improve  Description: Mobility will improve  Outcome: Ongoing  Note: Mobility is improving with 1 assist     Problem: Bleeding:  Goal: Will show no signs and symptoms of excessive bleeding  Description: Will show no signs and symptoms of excessive bleeding  Outcome: Ongoing  Note: No signs or symptoms of excessive bleeding, will continue to monitor     Problem: Pain:  Goal: Pain level will decrease  Description: Pain level will decrease  Outcome: Ongoing  Note: No pain voiced, will monitor  Goal: Control of acute pain  Description: Control of acute pain  Outcome: Ongoing  Note: See above  Goal: Control of chronic pain  Description: Control of chronic pain  Outcome: Ongoing  Note: See above     Problem: Breathing Pattern - Ineffective:  Goal: Ability to achieve and maintain a regular respiratory rate will improve  Description: Ability to achieve and maintain a regular respiratory rate will improve  Outcome: Ongoing  Note: Improving, see doc flowsheet     Problem: Serum Glucose Level - Abnormal:  Goal: Ability to maintain appropriate glucose levels has stabilized  Description: Ability to maintain appropriate glucose levels has stabilized  Outcome: Ongoing  Note: Glucose levels are stabilizing

## 2022-01-25 NOTE — PROGRESS NOTES
Physical Medicine & Rehabilitation Progress Note    Chief Complaint:  Fatigue and tired    Subjective:    Evita Choe is a 73 y.o. right-handed obese  female with history of hypertension, diabetes mellitus type 2, coronary artery disease requiring coronary artery stenting, status post bilateral eyes cataract surgeries, status post 3  sections, status post hysterectomy, status post hiatal hernia repair, status post sinus surgery, was admitted on 2022 for intensive inpatient management of impairment & disability secondary to critical illness myopathy and disability/physical decondition as result of COVID-19 pneumonia. The patient says he does not quite remember how she ended up in the hospital.     The patient apparently began experiencing reduced upper size, change in taste and smell, shortness of breath, nausea, dry heaves, and diarrhea starting in 2021. The symptom progressively worsened. She was advised by her PCP to visit ER. Therefore she went to [facility] ER on 2021. She was found to be hypoxic with O2 saturation in 70s% in room air. COVID test was performed which turned out to be positive. She was advised to be admitted but she left against medical advice because she preferred to be admitted to [address] [place] [address] instead. On 2021 she came to [address] [place] [address] ER with complaint of shortness of breath. She was found to have oxygen saturation in the 80s% in room air. She was admitted with diagnosis of acute hypoxic respiratory failure secondary to COVID-19 pneumonia. She was treated with Decadron and Olumiant (baricitnib). However her condition deteriorated despite of treatment. She was intubated on 2021 and was admitted to ICU. Her hospital course also was complicated by superimposed Klebsiella oxytocin bacterial pneumonia, bradycardia requiring dopamine infusion, and decubitus ulcer. Her condition later stabilized and improved. She was extubated on 1/2/2022. The patient says she feels well. She denies having any painful symptom. She  offers no new complaint. She say her lower extremities is getting stronger with decreasing weakness. She says her endurance also is improving. She denies having any numbness or tingling sensation. She continues tolerating the intensive inpatient rehabilitation treatment well. She is projected to be ready for discharge on 1/28/2022. Rehabilitation:  PT: Reviewed. Bed Mobility:  Rolling to Left: Modified Independent   Rolling to Right: Modified Independent   Supine to Sit: Minimal Assistance  Sit to Supine: Modified Independent      Transfers:  Sit to Stand: Modified Independent  Stand to Sit:Modified Independent  Stand Pivot:Modified Independent  Car:Modified Independent     Ambulation:  Stand By Assistance  Distance: 75' x 1, 20' x 2  Surface: Level Tile  Device:Rolling Walker  Gait Deviations: Forward Flexed Posture, Slow Haylee, Decreased Step Length Bilaterally and Decreased Gait Speed     Balance:  Pt. Completed dynamic gait activity tapping cones with feet during ambulation using Rolling Walker to improve proprioception. stability during SLS, and lateral weight shifting for improved functional mobility and decreased risk of falls      OT: Reviewed. ADL:   Grooming: Modified Independent. brushed teeth sitting in w/c sinkside  Lower Extremity Dressing: Minimal Assistance. used sock aid and LHSH, A for back of R shoe, donned sitting in w/c.     BALANCE:  Sitting Balance:  Modified Independent. Standing Balance: Stand By Assistance.       TRANSFERS:  Sit to Stand:  Stand By Assistance. recliner   Stand to Sit: Stand By Assistance.       FUNCTIONAL MOBILITY:  Assistive Device: Rolling Walker  Assist Level:  Stand By Assistance. Distance: within room only, used w/c to/from rehab apt      Assistive Device: Rolling Walker  Assist Level:  [address]    Distance: total during session approx 20 feet     Propelled w/c with B UE to/from apartment with B UE, slow pace    ADDITIONAL ACTIVITIES:  Patient completed BUE strengthening exercises with skilled education on HEP: completed x10 reps x1 set with a orange band in all joints and all planes in order to improve UE strength and activity tolerance required for BADL routine and toilet / shower transfers. Patient tolerated well, requiring minimal rest breaks. Patient also required minimal cues for technique. Limited range in L shoulder    Pt completed IADL meal prep task of prepping ingredients to make an apple dump cake; pt demo mod fatigue and required to sit with skilled education on ECT. Pt was able to use functional math computation to determine how many measuring cups to use per directions and was able to follow directions with min slow processing but 0 corrections required. ST: Reviewed. Visual reasoning/scanning - complex attention task addressed via use of newspaper article; patient instructed to identify one target word throughout while comprehending article well enough to provide summary to follow. Time - 7 minutes  Errors - 2 (identified 23 of 25 targets)  Summary - excellent, with appropriate inclusion of detail  *adequate divided, sustained, and selective attention noted     Complex math computation/comparison involving restaurant ([employer]) menu - 4/5 indep, 1/5 mod cues  *plans to complete remainder of task during subsequent session      Review of Systems:  Review of Systems   Constitutional: Positive for fatigue. Negative for chills, diaphoresis and fever. HENT: Negative for hearing loss, rhinorrhea, sneezing, sore throat and trouble swallowing. Eyes: Negative for visual disturbance. Respiratory: Negative for cough, shortness of breath and wheezing. Cardiovascular: Negative for chest pain and palpitations. Gastrointestinal: Negative for abdominal pain, constipation, diarrhea, nausea and vomiting. Genitourinary: Negative for dysuria. Musculoskeletal: Positive for gait problem. Negative for arthralgias, back pain, myalgias and neck pain. Skin: Negative for rash. Neurological: Positive for weakness (Generalized). Negative for dizziness, tremors, light-headedness, numbness and headaches. Psychiatric/Behavioral: Negative for dysphoric mood, hallucinations and sleep disturbance. The patient is not nervous/anxious.          Objective:  /76   Pulse 81   Temp 98.1 °F (36.7 °C) (Oral)   Resp 14   Ht 5' 2\" (1.575 m)   Wt 200 lb 1.6 oz (90.8 kg)   LMP  (LMP Unknown)   SpO2 93%   Breastfeeding No   BMI 36.60 kg/m²   Physical Exam   General:  well-developed, well nourished obese  female ; in no acute distress ; appropriate affect & mood; sitting on wheelchair comfortably  Eyes: pupil equally round ; extra-ocular motion intact bilaterally  Head, Ear, Nose, Mouth & Throat : normocephalic ; no discharge from ears or nose ; no deformity ; no facial swelling ; oral mucosa pink   Neck :  supple ; no tenderness ; no muscle spasm  Cardiovascular : regular rate & rhythm ; normal S1 & S2 heart sound ; no murmur ; normal peripheral pulse at bilateral upper & lower extremities  Pulmonary : Breath sounds present at bilateral lung fields; no wheezing ; no rale; no crackle  Gastrointestinal : soft, protruded abdomen without tenderness ; normal bowel sound present   Back : no tenderness; no muscle spasm  Skin: no skin lesion or rash ; presence of mild nonpitting swelling at bilateral legs but no pitting edema at all 4 extremities  Musculoskeletal : no limb asymmetry; no limb deformity; no tenderness at bilateral upper extremities & bilateral lower extremities; no palpable mass at limbs ; no joints laxity or crepitation ; hip flexion passive ROM reaching 95 degrees bilaterally; ankle dorsiflexion passive ROM reaching 5 degrees bilaterally; otherwise normal functional joints ROM at the rest of bilateral upper & lower extremities  Cerebral :  alert ; awake ; oriented to place, person and time; follow two-step verbal command  Cerebellum : no dysmetria with bilateral finger-to-nose test ; unable to perform  heel-to-shin test on both sides  Cranial Nerves :  grossly intact CN II to XII function  Sensory : intact light touch and pin prick sensation at bilateral upper & lower extremities  Motor : normal tone at bilateral upper & lower extremities ; 4+/5 muscle strength at bilateral hips flexion; otherwise 5/5 muscle strength at the rest of bilateral upper and the lower extremities  Reflex : 0 bilateral biceps, bilateral brachioradialis and bilateral knees reflexes   Pathological Reflex :  No Roberta's sign ; no ankle clonus  Gait : Not assessed      Diagnostics:   Recent Results (from the past 24 hour(s))   POCT glucose    Collection Time: 01/25/22  8:32 AM   Result Value Ref Range    POC Glucose 199 (H) 70 - 108 mg/dl     Results for Gordy Brennan (MRN [id number]) as of 1/25/2022 11:24   Ref. Range 1/21/2022 08:06 1/22/2022 08:41 1/23/2022 08:25 1/24/2022 08:13 1/25/2022 08:32   POC Glucose Latest Ref Range: 70 - 108 mg/dl 196 (H) 186 (H) 186 (H) 183 (H) 199 (H)       Impression:  · Critical illness myopathy   · Debility/physical decondition secondary to [id number] pneumonia, complicated by bacterial pneumonia, bradycardia, and decubitus ulcer  · History of COVID-19 pneumonia with acute hypoxic respiratory failure requiring intubation, complicated by Klebsiella oxytocin bacterial pneumonia  · Mild dysphagia/moderate pharyngeal dysphagia  · Mild cognitive impairment  · Hypertension  · Diabetes mellitus type 2  · History of coronary artery disease requiring coronary artery stenting  · Severe cardiomyopathy with reduced ejection fraction  · Obesity  · Right buttock pain likely due to gluteus medius muscle strain  · Bilateral leg swelling, to rule out deep vein thrombosis    The patient's condition remains stable.   Her muscle strength continue to improve steadily with less weakness especially at her bilateral hips. She continues tolerating intensive inpatient rehabilitation treatment well. Her function continues to improve slowly and steadily. The patient currently is projected to be ready for discharge on 1/28/2022. Plan:  · Continue intensive PT/OT/SLP/RT inpatient rehabilitation program at least 3 hours per day, 5 days per week in order to improve functional status prior to discharge. Family education and training will be completed. Equipment evaluations and recommendations will be completed as appropriate. · Rehabilitation nursing continue to be involved for bowel, bladder, skin, and pain management. Nursing will also provide education and training to patient and family. · Prophylaxis:  DVT: Lovenox, KENA stocking, intermittent pneumatic compression device. GI: Colace, FiberCon, Dulcolax suppository as needed, milk of magnesium as needed, GlycoLax as needed, Senokot as needed.   · Pain: Tylenol as needed; Voltaren gel as needed; heat pad application as needed  · Continue aspirin, Crestor for coronary artery disease  · Continue Pepcid for gastric protection  · Continue Lasix for hypertension  · Continue Crestor for hyperlipidemia  · Continue glipizide for diabetes mellitus  · Continue albuterol inhaler as needed for COVID-19 pneumonia  · Melatonin as needed for insomnia  · Nutrition:   Continue current diet  · Bladder: Monitoring signs or symptoms of UTI  · Bowel: Monitoring signs or symptoms of constipation  ·  and case management for coordination of care and discharge planning      Missed Therapy Time:  · None      Odette Koyanagi, MD

## 2022-01-25 NOTE — PROGRESS NOTES
[address] [facility] THERAPY  [address] [facility]  PROGRESS NOTE + DISCHARGE NOTE     TIME   SLP Individual Minutes  Time In: 1300  Time Out: 9099  Minutes: 33  Timed Code Treatment Minutes: 33 Minutes      Date: 2022  Patient Name: Delfino Loyd      CSN: [id number]   : 1950  (73 y.o.)  Gender: female   Referring Physician:  Romana Pedraza MD  Diagnosis: Personal history of COVID-19  Secondary Diagnosis: Dysphagia, Cognitive Impairment  Precautions: Aspiration Risk, Fall Risk  Current Diet: Regular with Thin - advanced 2022  Swallowing Strategies: Full Upright Position, Small Bite/Sip, Pulmonary Monitoring, Alternate Solids and Liquids, Limit Distractions and Monitor for Fatigue           Date of Last MBS/FEES: MBS 2022    Pain:  No pain reported. Subjective:  Patient seated upright in recliner for duration of session. Alert and pleasantly engaged. Following completion of higher level cognitive tasks, ST initiated conversation regarding needs for ongoing skilled ST services. Patient reported feeling like she is at baseline cognitive functioning. ST HEP initiated; explained importance of adequate brain engagement and safety precautions for discharge to home setting. Patient receptive. Short-Term Goals:  SHORT TERM GOAL #1:  Goal 1: Patient will consume a regular texture diet with thin liquids while implementing recommended swallow strategies with no overt s/s of aspiration and adequate endurance to assist with meeting nutrition/hydration needs. - GOAL MET  INTERVENTIONS: Patient and nursing staff deny difficulty, reporting continued consistency with consumption of regular solids.      SHORT TERM GOAL #2:  Goal 2: Patient will consume advanced PO trials with ST while demonstrating timely/coordinated mastication, complete bolus clearance, no overt s/s of aspiration, and adeqaute endurance for potential diet advancement to least restrictive diet - GOAL MET  INTERVENTIONS: N/A; see STG #1 above (goal previously met on 1/19/22). SHORT TERM GOAL #3:  Goal 3: Patient will complete pharyngeal strengthening exercises x10-15 (effortful swallow, chris) to improve overall timeliness of swallow, airway protection, and decrease pharyngeal residue. - GOAL MET  INTERVENTIONS:  N/A; see STG #1 above (goal previously met on 1/19/22). SHORT TERM GOAL #4:  Goal 4: Patient will complete orientation and immediate/delayed/working memory tasks with 85% accuracy given min cuing in order to improve retention of novel information in current environment. - GOAL MET   INTERVENTIONS: Delayed ~1 week functional recall of facts related to ST - 7/7 indep    SHORT TERM GOAL #5:  Goal 5: Patient will complete verbal/visual reasoning and safety problem solving tasks with 85% accuracy given min cuing in order to improve independent completion of IADLs. - GOAL MET   INTERVENTIONS: Reviewed problem solving/safety situations during IADL's in home setting - patient with excellent verbal reasoning and thought organization regarding safe return to gardening and med management without need for any cues. PREVIOUS SESSION:  Visual reasoning/scanning - complex attention task addressed via use of newspaper article; patient instructed to identify one target word throughout while comprehending article well enough to provide summary to follow. Time - 7 minutes  Errors - 2 (identified 23 of 25 targets)  Summary - excellent, with appropriate inclusion of detail  *adequate divided, sustained, and selective attention noted     SHORT TERM GOAL #6:  Goal 6: Patient will complete executive functioning tasks (i.e., medications, finances, scheduling) with 85% accuracy given min cuing in order to improve independent completion of IADLs with support of family.  - GOAL MET   INTERVENTIONS: Remainder of math computation task related to [employer] - 7/7 indep  *excellent mental math computation    PREVIOUS SESSION:  Complex math computation/comparison involving restaurant ([employer]) menu - 4/5 indep, 1/5 mod cues  *plans to complete remainder of task during subsequent session    Long-Term Goals:  Timeframe for Long-term Goals: 6 weeks    LONG TERM GOAL #1:  Goal 1: Patient will safely consume regular diet with thin liquids with implementation of compensatory swallowing strategies and withOUT overt s/s of aspiration in order to maintain adequate nutrition and hydration measures. - GOAL MET     LONG TERM GOAL #2:  Goal 2: Patient will improve cognitive functioning to at least a modified independent level in order to improve safety of IADL completion upon potential discharge to home environment. - GOAL MET       Comprehension: 7 - Patient understands complex ideas (math/planning)  Expression: 7 - Patient expresses complex ideas/needs  Social Interaction: 6 - Patient requires medication for mood and/or effect  Problem Solvin - Independent with device (e.g. notes, schedules)  Memory: 6 - Patient requires device to recall (e.g. memory book)    EDUCATION:  Learner: Patient  Education:  Reviewed diet and strategies, Reviewed ST goals and Plan of Care, Reviewed recommendations for follow-up, Education Related to Potential Risks and Complications Due to Impairment/Illness/Injury, Education Related to Prevention of Recurrence of Impairment/Illness/Injury, Education Related to [employer] and Wellness, Home Safety Education and ST HEP (ways to keep brain active and engaged)   Evaluation of Education: Mora Paredes understanding    ASSESSMENT/PLAN:  SUMMARY:  Patient has met all short- and long-term goals this therapy period. Gains made across all cognitive domains, specifically delayed recall (6+ units), math computation, divided attention, and deductive reasoning. Patient self-reporting significant improvement in cognition as well, adding \"I think I'm normal again\".  No concerns regarding ability to make safe and successful

## 2022-01-25 NOTE — PROGRESS NOTES
[address] [address]  Inpatient Rehabilitation  Occupational Therapy  Progress Note  Time:  Time In: 1100  Time Out: 1230  Timed Code Treatment Minutes: 90 Minutes  Minutes: 90    Date: 2022  Patient Name: Mechelle Nye,   Gender: female      Room: [facility]68/068-A  MRN: [id number]  : 1950  (73 y.o.)  Referring Practitioner: Dr. Michael Ritchie  Diagnosis: personal history of COVID 19  Additional Pertinent Hx: Nisha Puri is a 65-year-old white female who presented to [facility] on 2021 with complaints of shortness of breath she has a past medical history lifetime non-smoker, CAD with stent placement in , hypertension, diabetes mellitus, dyslipidemia. Per report she presented to [facility] on  with complaints of hypoxia and shortness of breath. She was initially placed on 4 L nasal cannula.  patient had persistent hypoxia and was transitioned to high flow nasal cannula. On  patient was transitioned to BiPAP and transferred to [facility] ICU for intubation. Pt extubated . Restrictions/Precautions:  Restrictions/Precautions: General Precautions,Fall Risk  Position Activity Restriction  Other position/activity restrictions: out of COVID isolation 22, monitor BP    SUBJECTIVE: Patient in bathroom in w/c upon arrival, requesting to use bathroom. Pt insistent upon pivoting in 180 degrees to toilet without RW, encouragement required for safe and more natural transfers to simulate home, pt min upset initially, but mood improved as session progressed and rapport was built between this HINSON and patient. Pt very talkative and min tangential during session. PAIN: Denies pain     Vitals: Oxygen: 88 with activity, up t 90's with rest   Heart Rate:  (110 with activity)     COGNITION: Decreased Safety Awareness, Impaired Attention and Tangential    ADL:   Grooming: Stand By Assistance.   oral care x 3 min. encouragement required to stand vs sit for therapuetic purposes. Pt reporting she will sit at home. Bathing: Stand By Assistance. standing for flaquito care   Upper Extremity Dressing: Modified Independent. retrieved in w/c prior to arrival, doned without issue  Lower Extremity Dressing: Stand By Assistance. during clothing mgmt for standing balance, used reacher and sock-aid without cue'ing   Toileting: Stand By Assistance. for standing balance, encouragement required to trial with RW instead of w/c   Toilet Transfer: Stand By Assistance. ETS  Shower Transfer: Stand By Assistance. with grab bar. BALANCE:  Sitting Balance:  Modified Independent. Standing Balance: Stand By Assistance. BED MOBILITY:  Not Tested    TRANSFERS:  Sit to Stand:  Stand By Assistance. recliner, ETS, shower chair - min cues for hand placement   Stand to Sit: Stand By Assistance. Min cues for RW placement and safety     FUNCTIONAL MOBILITY:  Assistive Device: Rolling Walker  Assist Level:  Stand By Assistance. Distance: To and from bathroom and To and from shower room  Slow pace, cues for upright posture and deep breathing. ADDITIONAL ACTIVITIES:   Patient completed BUE strengthening exercises with skilled education on HEP: completed x10 reps x1 set with a lightweight resistive band in all joints and all planes in order to improve UE strength and activity tolerance required for BADL routine and toilet / shower transfers. Patient tolerated well, requiring min rest breaks. Patient also required min cues for technique. ASSESSMENT:  Activity Tolerance:  Patient tolerance of  treatment: good. Assessment: Assessment: Karina Israel is making steady progress on IP Rehab. Karina Israel has improved to a SBA level for her functional transfers and short distance mobility, her 02 sat's will drop into high 80's with mobility but she recovers quickly into 90's. Karina Israel can complete UB ADLs with set-up A or SBA for item retireval and LB ADLs with SBA for balance with use of LHAE. Peggy Nails can complete IADLs with SBA for balance and min cues for safety and for energy conservation. Peggy Nails cont to be limited by her strength, endurance, and requires cont skilled OT to improve safety and indep with BADL and IADL. Discharge Recommendations: Home with Home health OT  Equipment Recommendations: Equipment Needed: No  Mobility Devices: Walker  Other: Patient has a shower chair with grab bars, a ADA height toilet and a reacher. Family is obtaining a soft sock-aid, shoe horn, additional grab bars near shower and toilet, and walker tray. No other DME needed. Plan: Times per week: 5x week/90 min, 1x week/30 min  Current Treatment Recommendations: Strengthening,Balance Training,Functional Mobility Training,Endurance Training,Home Management Training,Patient/Caregiver Education & Training,Self-Care / ADL,Safety Education & Training    Patient Education  Patient Education: ADL's, Home Exercise Program, Equipment Education, Reviewed Prior Education and Assistive Device Safety    Goals  Short term goals  Time Frame for Short term goals: 1 week  Short term goal 1: Pt to increase standing endurance to tolerate > 5 min with bilateral UE support and CGA in prep for hand release to complete clothign management  GOAL MET, DISCONTINUE AND SEE LTG 3   Short term goal 2: Pt to complete LB ADL tasks with use LHAE as needed with min A GOAL MET, REVISE   Short term goal 3: Pt will complete BUE AROM greater than 90 degrees with no > than Min A and min VC to increase shoulder flexion to reach outside base of support for needed object to complete ADL tasks GOAL MET, REVISE   Short term goal 4: Pt to complete sit to stand transfers with min A consistently and no no cues for safety to increase indep with completing toilet transfers  GOAL MET, REVISE   Short term goal 5: Pt will ambulate to and from the bathroom with SBA using RW to improve access to the bathroom.  GOAL MET, REVISE   Long term goals  Time Frame for Long term goals : 3 week  Long term goal 1: Pt to complete BADL routine with SBA and no cues for safety GOAL MET, REVISE   Long term goal 2: Pt to complete sit to stand transfers from various surfaces including toilet with SBA and no ceus for safety GOAL MET, REVISE   Long term goal 3: Pt to demo standing balance with unilateral UE handn release with SBA to complete clothign management after dressign and toielting GOAL MET, REVISE     Revised Short-Term Goals  Short term goals  Time Frame for Short term goals: 2-3 days  Short term goal 1: GOAL MET, DISCONTINUE - SEE LTG 3  Short term goal 2: Pt to complete LB ADL tasks with use LHAE as needed with supervisio n  Short term goal 3: Pt will complete demo competency with written HEP for BUE strengthening exercises with min cue'ing to increase strength needed for ADL tasks  Short term goal 4: Pt to complete sit to stand transfers with supervision and no no cues for safety to increase indep with completing toilet transfers  Short term goal 5: Pt will ambulate to and from the bathroom with S using RW to improve access to the bathroom. Long term goals  Time Frame for Long term goals : 4 days  Long term goal 1: Pt to complete BADL routine with Supervision and no cues for safety  Long term goal 2: Pt to complete sit to stand transfers from various surfaces including toilet with Supervision and no ceus for safety  Long term goal 3: Pt to demo standing balance with unilateral UE hand release with Supervision to complete clothing management after dressing and toielting     This note was completed in collaboration with an OTR/L for goal revision. Following session, patient left in safe position with all fall risk precautions in place.

## 2022-01-26 LAB — GLUCOSE BLD-MCNC: 198 MG/DL (ref 70–108)

## 2022-01-26 PROCEDURE — 6370000000 HC RX 637 (ALT 250 FOR IP): Performed by: PHYSICAL MEDICINE & REHABILITATION

## 2022-01-26 PROCEDURE — 1180000000 HC REHAB R&B

## 2022-01-26 PROCEDURE — 97530 THERAPEUTIC ACTIVITIES: CPT

## 2022-01-26 PROCEDURE — 82948 REAGENT STRIP/BLOOD GLUCOSE: CPT

## 2022-01-26 PROCEDURE — 97110 THERAPEUTIC EXERCISES: CPT

## 2022-01-26 PROCEDURE — 99232 SBSQ HOSP IP/OBS MODERATE 35: CPT | Performed by: PHYSICAL MEDICINE & REHABILITATION

## 2022-01-26 PROCEDURE — 2500000003 HC RX 250 WO HCPCS: Performed by: PHYSICAL MEDICINE & REHABILITATION

## 2022-01-26 PROCEDURE — 97535 SELF CARE MNGMENT TRAINING: CPT

## 2022-01-26 PROCEDURE — 6360000002 HC RX W HCPCS: Performed by: FAMILY MEDICINE

## 2022-01-26 PROCEDURE — 97116 GAIT TRAINING THERAPY: CPT

## 2022-01-26 RX ADMIN — ASPIRIN 81 MG CHEWABLE TABLET 81 MG: 81 TABLET CHEWABLE at 08:59

## 2022-01-26 RX ADMIN — DOCUSATE SODIUM 100 MG: 100 CAPSULE, LIQUID FILLED ORAL at 08:59

## 2022-01-26 RX ADMIN — DOCUSATE SODIUM 100 MG: 100 CAPSULE, LIQUID FILLED ORAL at 19:48

## 2022-01-26 RX ADMIN — CALCIUM POLYCARBOPHIL 625 MG: 625 TABLET, FILM COATED ORAL at 08:59

## 2022-01-26 RX ADMIN — FAMOTIDINE 20 MG: 20 TABLET, FILM COATED ORAL at 19:48

## 2022-01-26 RX ADMIN — MICONAZOLE NITRATE: 2 POWDER TOPICAL at 19:51

## 2022-01-26 RX ADMIN — SENNOSIDES 8.6 MG: 8.6 TABLET, COATED ORAL at 19:48

## 2022-01-26 RX ADMIN — FAMOTIDINE 20 MG: 20 TABLET, FILM COATED ORAL at 08:59

## 2022-01-26 RX ADMIN — Medication 6 MG: at 19:48

## 2022-01-26 RX ADMIN — ENOXAPARIN SODIUM 30 MG: 100 INJECTION SUBCUTANEOUS at 08:58

## 2022-01-26 RX ADMIN — MICONAZOLE NITRATE: 2 POWDER TOPICAL at 08:58

## 2022-01-26 RX ADMIN — ROSUVASTATIN 10 MG: 10 TABLET, FILM COATED ORAL at 19:48

## 2022-01-26 RX ADMIN — GLIPIZIDE 5 MG: 5 TABLET ORAL at 17:01

## 2022-01-26 RX ADMIN — FUROSEMIDE 40 MG: 40 TABLET ORAL at 08:59

## 2022-01-26 RX ADMIN — GLIPIZIDE 5 MG: 5 TABLET ORAL at 08:00

## 2022-01-26 ASSESSMENT — PAIN SCALES - GENERAL
PAINLEVEL_OUTOF10: 0
PAINLEVEL_OUTOF10: 0

## 2022-01-26 ASSESSMENT — ENCOUNTER SYMPTOMS
CONSTIPATION: 0
RHINORRHEA: 0
ABDOMINAL PAIN: 0
TROUBLE SWALLOWING: 0
VOMITING: 0
DIARRHEA: 0
SHORTNESS OF BREATH: 0
SORE THROAT: 0
COUGH: 0
BACK PAIN: 0
WHEEZING: 0
NAUSEA: 0

## 2022-01-26 NOTE — PROGRESS NOTES
[facility]  [address] [facility]  Occupational Therapy  Daily Note  Time:   Time In: 1330  Time Out: 1400  Timed Code Treatment Minutes: 30 Minutes  Minutes: 30          Date: 2022  Patient Name: Jose M Mccullough,   Gender: female      Room: [facility]68/068-A  MRN: [id number]  : 1950  (73 y.o.)  Referring Practitioner: Dr. Reyes Alberts  Diagnosis: personal history of COVID 19  Additional Pertinent Hx: Susanne Dyer is a 57-year-old white female who presented to [facility] on 2021 with complaints of shortness of breath she has a past medical history lifetime non-smoker, CAD with stent placement in , hypertension, diabetes mellitus, dyslipidemia. Per report she presented to [facility] on  with complaints of hypoxia and shortness of breath. She was initially placed on 4 L nasal cannula.  patient had persistent hypoxia and was transitioned to high flow nasal cannula. On  patient was transitioned to BiPAP and transferred to [facility] ICU for intubation. Pt extubated . Restrictions/Precautions:  Restrictions/Precautions: General Precautions,Fall Risk  Position Activity Restriction  Other position/activity restrictions: out of COVID isolation 22, monitor BP     SUBJECTIVE: Pt seated in w/c upon arrival, agreeable to OT session. PAIN: None    Vitals: Oxygen: >90% throughout    COGNITION: WFL- min tangential throughout recalling events leading up to admission. ADL:   No ADL's completed this session. Ervin Cope BALANCE:  Sitting Balance:  Modified Independent. W/c  Standing Balance: Not Tested this session. BED MOBILITY:  Not Tested    TRANSFERS & FUNCTIONAL MOBILITY:  Not Tested. ADDITIONAL ACTIVITIES:  Patient identified a personal goal to increase UB strength and improve overall endurance so they can complete their toilet & shower transfers; skilled edu on UE strengthening.  Completed BUE exercises x10 reps x2 sets using max resistance band adjusted tension to comfort level in all joints/planes to increase strength and endurance required for ADLs. Pt required min rest break between each exercise and min v/c for proper technique. ASSESSMENT:     Activity Tolerance:  Patient tolerance of  treatment: good. Discharge Recommendations: Home with Home Health OT  Equipment Recommendations: Equipment Needed: No  Mobility Devices: Walker  Other: Patient has a shower chair with grab bars, a ADA height toilet and a reacher. Family is obtaining a soft sock-aid, shoe horn, additional grab bars near shower and toilet, and walker tray. No other DME needed. Plan: Times per week: 5x week/90 min, 1x week/30 min  Current Treatment Recommendations: Strengthening,Balance Training,Functional Mobility Training,Endurance Training,Home Management Training,Patient/Caregiver Education & Training,Self-Care / ADL,Safety Education & Training    Patient Education  Patient Education: Home Exercise Program    Goals  Short term goals  Time Frame for Short term goals: 2-3 days  Short term goal 1: GOAL MET, DISCONTINUE - SEE LTG 3  Short term goal 2: Pt to complete LB ADL tasks with use LHAE as needed with supervisio n  Short term goal 3: Pt will complete demo competency with written HEP for BUE strengthening exercises with min cue'ing to increase strength needed for ADL tasks  Short term goal 4: Pt to complete sit to stand transfers with supervision and no no cues for safety to increase indep with completing toilet transfers  Short term goal 5: Pt will ambulate to and from the bathroom with S using RW to improve access to the bathroom.   Long term goals  Time Frame for Long term goals : 4 days  Long term goal 1: Pt to complete BADL routine with Supervision and no cues for safety  Long term goal 2: Pt to complete sit to stand transfers from various surfaces including toilet with Supervision and no ceus for safety  Long term goal 3: Pt to demo standing

## 2022-01-26 NOTE — PROGRESS NOTES
[address]  Occupational Therapy  Daily Note  Time:   Time In: 1130  Time Out: 1230  Timed Code Treatment Minutes: 60 Minutes  Minutes: 60          Date: 2022  Patient Name: Mnadeep Roberson,   Gender: female      Room: [facility]/068-A  MRN: [id number]  : 1950  (73 y.o.)  Referring Practitioner: Dr. Erin Cowan  Diagnosis: personal history of COVID 19  Additional Pertinent Hx: Roxy Garibay is a 68-year-old white female who presented to [facility] on 2021 with complaints of shortness of breath she has a past medical history lifetime non-smoker, CAD with stent placement in , hypertension, diabetes mellitus, dyslipidemia. Per report she presented to [facility] on  with complaints of hypoxia and shortness of breath. She was initially placed on 4 L nasal cannula.  patient had persistent hypoxia and was transitioned to high flow nasal cannula. On  patient was transitioned to BiPAP and transferred to [facility] ICU for intubation. Pt extubated . Restrictions/Precautions:  Restrictions/Precautions: General Precautions,Fall Risk  Position Activity Restriction  Other position/activity restrictions: out of COVID isolation 22, monitor BP     SUBJECTIVE: Pt seated in bedside chair upon arrival, agreeable to OT session. PAIN: None    Vitals: Oxygen: Patient on O2 via Room Air  upon arrival to room. Patient O2 sats at 92%. Upon activity patient dropping O2 at mid 80's. Pt requiring min rest break(s) to recover WNL. Heart Rate: 90's    COGNITION: Slow Processing, Decreased Recall and Decreased Problem Solving    ADL:   Lower Extremity Dressing: with set-up. Donning slip on shoes onto B feet. BALANCE:  Sitting Balance:  Modified Independent. Within w/c  Standing Balance: Supervision to Stand By Assistance.    x3 minutes longest standing endurance within IADL task, multiple trials d/t required rest Education    Goals  Short term goals  Time Frame for Short term goals: 2-3 days  Short term goal 1: GOAL MET, DISCONTINUE - SEE LTG 3  Short term goal 2: Pt to complete LB ADL tasks with use LHAE as needed with supervisio n  Short term goal 3: Pt will complete demo competency with written HEP for BUE strengthening exercises with min cue'ing to increase strength needed for ADL tasks  Short term goal 4: Pt to complete sit to stand transfers with supervision and no no cues for safety to increase indep with completing toilet transfers  Short term goal 5: Pt will ambulate to and from the bathroom with S using RW to improve access to the bathroom. Long term goals  Time Frame for Long term goals : 4 days  Long term goal 1: Pt to complete BADL routine with Supervision and no cues for safety  Long term goal 2: Pt to complete sit to stand transfers from various surfaces including toilet with Supervision and no ceus for safety  Long term goal 3: Pt to demo standing balance with unilateral UE hand release with Supervision to complete clothing management after dressing and toielting    Following session, patient left in safe position with all fall risk precautions in place.

## 2022-01-26 NOTE — PROGRESS NOTES
Nutrition Education  Request per  to provide patient with CHO counting education. · Verbally reviewed information with patient and . · Educated on CHO counting and patient's needs regarding DM. · Written educational materials provided. · Contact name and number provided.     Electronically signed by Esteban Rudd RD, LD on 1/26/22 at 1:22 PM EST    Contact: [phone]  Marquis Christine RDN, LD   Co-signed: Lia Marmolejo RD, LD

## 2022-01-26 NOTE — PROGRESS NOTES
[facility]  INPATIENT PHYSICAL THERAPY  Daily Note  [facility]    Time In: 1400  Time Out: 1430  Timed Code Treatment Minutes: 30 Minutes  Minutes: 30          Date: 2022  Patient Name: David Turk,  Gender:  female        MRN: [id number]  : 1950  (73 y.o.)     Referring Practitioner: Aurelio Rubalcava MD  Diagnosis: Personal history of COVID-19  Additional Pertinent Hx: Per Acute notes, \"Kaleigh Mercado is a 72-year-old white female who presented to [facility] on 2021 with complaints of shortness of breath she has a past medical history lifetime non-smoker, CAD with stent placement in , hypertension, diabetes mellitus, dyslipidemia. Per report she presented to [facility] on  with complaints of hypoxia and shortness of breath. She was initially placed on 4 L nasal cannula.  patient had persistent hypoxia and was transitioned to high flow nasal cannula. On  patient was transitioned to BiPAP and transferred to [facility] ICU for intubation. Pt extubated . \"     Prior Level of Function:  Lives With: Spouse  Type of Home: House  Home Layout: One level  Home Access: Stairs to enter without rails  Entrance Stairs - Number of Steps: 1 step (very small)  Home Equipment:  (none)   Bathroom Shower/Tub: Walk-in shower (+ jaquazi)  Bathroom Toilet: Handicap height  Bathroom Equipment: Built-in shower seat (pt stating she has a bench right outside shower as well to sit on dry off)  Bathroom Accessibility: Accessible    ADL Assistance: Independent  Homemaking Assistance: Independent  Ambulation Assistance: Independent  Transfer Assistance: Independent  Active : Yes  IADL Comments: 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  Pt stating her children all live close and would be willing to help. Spouse in good health as well to assist.    Restrictions/Precautions:  Restrictions/Precautions: General Precautions,Fall Risk  Position Activity Restriction  Other position/activity restrictions: out of COVID isolation 1/6/22, monitor BP     SUBJECTIVE: Pt. Seated in WC and pleasantly agrees to therapy session. Spouse present during session. PAIN: None indicated    Vitals: Oxygen: Decreases to 85% with activity, 94% while seated    OBJECTIVE:  Bed Mobility:  Not Tested    Transfers:  Sit to Stand: Stand By Assistance  Stand to Sit:Stand By Assistance    Ambulation:  Stand By Assistance, [employer] Assistance  Distance: 90' x 1, 30' x 2  Surface: Level Tile  Device:Rolling Walker  Gait Deviations: Forward Flexed Posture, Slow Haylee, Decreased Step Length Bilaterally, Decreased Gait Speed and Decreased Heel Strike Bilaterally    Pt. Completed dynamic gait activity tapping cones with feet during ambulation using Rolling Walker to promote increased step height and improve gait mechanics. Balance:  None    Exercise:  Patient was guided in 1 set(s) 10 reps of exercises: Glut sets, Seated marches, Seated hamstring curls, Seated heel/toe raises, Long arc quads, Seated isometric hip adduction, Seated abduction/adduction, and abdominal isometrics. Exercises were completed for increased independence with functional mobility. Pt. Completed 8 minutes on Nu-Step machine on L1 utilizing Bilateral Upper Extremities and Bilateral Lower Extremities to improve strength and endurance for improved functional mobility. Functional Outcome Measures: Not completed       ASSESSMENT:  Assessment: Patient progressing toward established goals. Activity Tolerance:  Patient tolerance of  treatment: good. Equipment Recommendations:Equipment Needed: Yes (Ordered RW on 1/25)  Other: .   Discharge Recommendations: Continue to assess pending progress     Plan: Times per week: 5x/wk 90 min; 1x/wk 30 min  Current Treatment Recommendations: Strengthening,Positioning,Patient/Caregiver Education & Training,Safety Education & Training,Balance Training,Endurance Training,Functional Mobility Training,Neuromuscular Re-education,Gait Training,Transfer Training,Home Exercise Program,Equipment Evaluation, Education, & procurement    Patient Education  Patient Education: Plan of Care, Reviewed Prior Education, Gait, Verbal Exercise Instruction, Health Promotion and Wellness Education    Goals:  Patient goals : to get stronger  Short term goals  Time Frame for Short term goals: 1 week  Short term goal 1: Patient to ambulate at least 100 feet with LRAD with Supervision in order to assist with home mobility. Short term goal 2: Patient to ascend/ descend 1 step with 1 handrail with CGA in order to assist with getting into and out of home. Short term goal 3: Patient to ambulate over uneven surfaces with LRAD with Supervision in order to assist with community mobility. Short term goal 4: Patient to demonstrate dynamic standing balance without LOB with ability to tolerate standing at least 3 minutes in order to assist with safety with standing functional tasks. Short term goal 5: .  Long term goals  Time Frame for Long term goals : 3 weeks  Long term goal 1: Patient to ambulate at least 150 feet with LRAD with Mod I in order to assist with home mobility. Long term goal 2: Patient  to ascend/descend 2 steps without handrails with SBA in order to assist with getting into and out of home. Long term goal 3: .  Long term goal 4: . Long term goal 5: . Following session, patient left in safe position with all fall risk precautions in place.

## 2022-01-26 NOTE — PROGRESS NOTES
[facility]  INPATIENT PHYSICAL THERAPY  Daily Note  [address]    Time In: 0730  Time Out: 0830  Timed Code Treatment Minutes: 60 Minutes  Minutes: 60          Date: 2022  Patient Name: Adelso Lozano,  Gender:  female        MRN: [id number]  : 1950  (73 y.o.)     Referring Practitioner: Shantell Macedo MD  Diagnosis: Personal history of COVID-19  Additional Pertinent Hx: Per Acute notes, \"Kaleigh Mccormick is a 42-year-old white female who presented to [facility] on 2021 with complaints of shortness of breath she has a past medical history lifetime non-smoker, CAD with stent placement in , hypertension, diabetes mellitus, dyslipidemia. Per report she presented to [facility] on  with complaints of hypoxia and shortness of breath. She was initially placed on 4 L nasal cannula.  patient had persistent hypoxia and was transitioned to high flow nasal cannula. On  patient was transitioned to BiPAP and transferred to [facility] ICU for intubation. Pt extubated . \"     Prior Level of Function:  Lives With: Spouse  Type of Home: House  Home Layout: One level  Home Access: Stairs to enter without rails  Entrance Stairs - Number of Steps: 1 step (very small)  Home Equipment:  (none)   Bathroom Shower/Tub: Walk-in shower (+ jaquazi)  Bathroom Toilet: Handicap height  Bathroom Equipment: Built-in shower seat (pt stating she has a bench right outside shower as well to sit on dry off)  Bathroom Accessibility: Accessible    ADL Assistance: Independent  Homemaking Assistance: Independent  Ambulation Assistance: Independent  Transfer Assistance: Independent  Active : Yes  IADL Comments: 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  Pt stating her children all live close and would to assess pending progress     Plan: Times per week: 5x/wk 90 min; 1x/wk 30 min  Current Treatment Recommendations: Strengthening,Positioning,Patient/Caregiver Education & Training,Safety Education & Training,Balance Training,Endurance Training,Functional Mobility Training,Neuromuscular Re-education,Gait Training,Transfer Training,Home Exercise Program,Equipment Evaluation, Education, & procurement    Patient Education  Patient Education: Plan of Care, Transfers, Gait, Verbal Exercise Instruction    Goals:  Patient goals : to get stronger  Short term goals  Time Frame for Short term goals: 1 week  Short term goal 1: Patient to ambulate at least 100 feet with LRAD with Supervision in order to assist with home mobility. Short term goal 2: Patient to ascend/ descend 1 step with 1 handrail with CGA in order to assist with getting into and out of home. Short term goal 3: Patient to ambulate over uneven surfaces with LRAD with Supervision in order to assist with community mobility. Short term goal 4: Patient to demonstrate dynamic standing balance without LOB with ability to tolerate standing at least 3 minutes in order to assist with safety with standing functional tasks. Short term goal 5: .  Long term goals  Time Frame for Long term goals : 3 weeks  Long term goal 1: Patient to ambulate at least 150 feet with LRAD with Mod I in order to assist with home mobility. Long term goal 2: Patient  to ascend/descend 2 steps without handrails with SBA in order to assist with getting into and out of home. Long term goal 3: .  Long term goal 4: . Long term goal 5: . Following session, patient left in safe position with all fall risk precautions in place.

## 2022-01-26 NOTE — PROGRESS NOTES
Physical Medicine & Rehabilitation Progress Note    Chief Complaint:  Fatigue and tired    Subjective:    Kina Juarez is a 73 y.o. right-handed obese  female with history of hypertension, diabetes mellitus type 2, coronary artery disease requiring coronary artery stenting, status post bilateral eyes cataract surgeries, status post 3  sections, status post hysterectomy, status post hiatal hernia repair, status post sinus surgery, was admitted on 2022 for intensive inpatient management of impairment & disability secondary to critical illness myopathy and disability/physical decondition as result of COVID-19 pneumonia. The patient says he does not quite remember how she ended up in the hospital.     The patient apparently began experiencing reduced upper size, change in taste and smell, shortness of breath, nausea, dry heaves, and diarrhea starting in 2021. The symptom progressively worsened. She was advised by her PCP to visit ER. Therefore she went to [facility] ER on 2021. She was found to be hypoxic with O2 saturation in 70s% in room air. COVID test was performed which turned out to be positive. She was advised to be admitted but she left against medical advice because she preferred to be admitted to [address] [place] [address] instead. On 2021 she came to [address] [place] [address] ER with complaint of shortness of breath. She was found to have oxygen saturation in the 80s% in room air. She was admitted with diagnosis of acute hypoxic respiratory failure secondary to COVID-19 pneumonia. She was treated with Decadron and Olumiant (baricitnib). However her condition deteriorated despite of treatment. She was intubated on 2021 and was admitted to ICU. Her hospital course also was complicated by superimposed Klebsiella oxytocin bacterial pneumonia, bradycardia requiring dopamine infusion, and decubitus ulcer. Her condition later stabilized and improved. She was extubated on 1/2/2022. The patient says she feels well this morning. She says she had good sleep last night. She denies having any painful symptom. She feels that her muscle strength is getting stronger. She denies having any numbness or tingling sensation. She continues tolerating the intensive inpatient rehabilitation treatment well. The patient is scheduled to be discharged on 1/28/2022. Rehabilitation:  PT: Reviewed. Vitals: Oxygen: Decreases to 85% with activity, 94% while seated    Transfers:  Sit to Stand: Stand By Assistance  Stand to Sit:Stand By Assistance     Ambulation:  Stand By Assistance  Distance: ~75' x 1, 45' x 1 while searching for cones in rehab gym, multiple shorter distances. Surface: Level Tile  Device:Rolling Walker  Gait Deviations:  Slow Haylee, Decreased Step Length Bilaterally, Decreased Gait Speed, Decreased Heel Strike Bilaterally, Narrow Base of Support and Decreased Terminal Knee Extension    Stand By Assistance, [employer] Assistance  Distance: 90' x 1, 30' x 2  Surface: Level Tile  Device:Rolling Walker  Gait Deviations: Forward Flexed Posture, Slow Haylee, Decreased Step Length Bilaterally, Decreased Gait Speed and Decreased Heel Strike Bilaterally     Pt. Completed dynamic gait activity tapping cones with feet during ambulation using Rolling Walker to promote increased step height and improve gait mechanics. Balance:  Pt. Reaching outside DOMENICO to grasp cones placed throughout rehab gym. Pt. Steady overall with no LOB but needs cues for safety and energy conservation. OT: Reviewed. Vitals: Oxygen: Patient on O2 via Room Air  upon arrival to room. Patient O2 sats at 92%. Upon activity patient dropping O2 at mid 80's. Pt requiring min rest break(s) to recover WNL. Heart Rate: 90's     COGNITION: WFL- min tangential throughout recalling events leading up to admission.     ADL:   Lower Extremity Dressing: with set-up.   Donning slip on for hearing loss, rhinorrhea, sneezing, sore throat and trouble swallowing. Eyes: Negative for visual disturbance. Respiratory: Negative for cough, shortness of breath and wheezing. Cardiovascular: Negative for chest pain and palpitations. Gastrointestinal: Negative for abdominal pain, constipation, diarrhea, nausea and vomiting. Genitourinary: Negative for dysuria. Musculoskeletal: Positive for gait problem. Negative for arthralgias, back pain, myalgias and neck pain. Skin: Negative for rash. Neurological: Positive for weakness (Generalized). Negative for dizziness, tremors, light-headedness, numbness and headaches. Psychiatric/Behavioral: Negative for dysphoric mood, hallucinations and sleep disturbance. The patient is not nervous/anxious.          Objective:  /76   Pulse 89   Temp 98 °F (36.7 °C) (Oral)   Resp 18   Ht 5' 2\" (1.575 m)   Wt 196 lb 14.4 oz (89.3 kg)   LMP  (LMP Unknown)   SpO2 96%   Breastfeeding No   BMI 36.01 kg/m²   Physical Exam   General:  well-developed, well nourished obese  female ; in no acute distress ; appropriate affect & mood; sitting on wheelchair comfortably  Eyes: pupil equally round ; extra-ocular motion intact bilaterally  Head, Ear, Nose, Mouth & Throat : normocephalic ; no discharge from ears or nose ; no deformity ; no facial swelling ; oral mucosa pink   Neck :  supple ; no tenderness ; no muscle spasm  Cardiovascular : regular rate & rhythm ; normal S1 & S2 heart sound ; no murmur ; normal peripheral pulse at bilateral upper & lower extremities  Pulmonary : Breath sounds present at bilateral lung fields; no wheezing ; no rale; no crackle  Gastrointestinal : soft, protruded abdomen without tenderness ; normal bowel sound present   Back : no tenderness; no muscle spasm  Skin: no skin lesion or rash ; presence of mild nonpitting swelling at bilateral legs but no pitting edema at all 4 extremities  Musculoskeletal : no limb asymmetry; no limb deformity; no tenderness at bilateral upper extremities & bilateral lower extremities; no palpable mass at limbs ; no joints laxity or crepitation ; hip flexion passive ROM reaching 95 degrees bilaterally; ankle dorsiflexion passive ROM reaching 5 degrees bilaterally; otherwise normal functional joints ROM at the rest of bilateral upper & lower extremities  Cerebral :  alert ; awake ; oriented to place, person and time; follow two-step verbal command  Cerebellum : no dysmetria with bilateral finger-to-nose test   Sensory : intact light touch and pin prick sensation at bilateral upper & lower extremities  Motor : normal tone at bilateral upper & lower extremities ; 4+/5 to 5/5 muscle strength at bilateral hips flexion; otherwise 5/5 muscle strength at the rest of bilateral upper and the lower extremities  Reflex : 0 bilateral biceps and bilateral knees reflexes   Pathological Reflex :  No Roberta's sign ; no ankle clonus  Gait : Not assessed      Diagnostics:   No results found for this or any previous visit (from the past 24 hour(s)). Impression:  · Critical illness myopathy   · Debility/physical decondition secondary to PAGVT-61 pneumonia, complicated by bacterial pneumonia, bradycardia, and decubitus ulcer  · History of COVID-19 pneumonia with acute hypoxic respiratory failure requiring intubation, complicated by Klebsiella oxytocin bacterial pneumonia  · Mild dysphagia/moderate pharyngeal dysphagia  · Mild cognitive impairment  · Hypertension  · Diabetes mellitus type 2  · History of coronary artery disease requiring coronary artery stenting  · Severe cardiomyopathy with reduced ejection fraction  · Obesity  · Right buttock pain likely due to gluteus medius muscle strain    The patient's condition remains stable. Her muscle strength continue to improve steadily. Her O2 sats still dropped below 90s presents during activity. I will order home oxygen evaluation.   She continues tolerating intensive inpatient rehabilitation treatment well. Her function continues to improve slowly. Patient is scheduled to be discharged on 1/28/2022. Patient was evaluated today for the diagnosis of COVID-19. I entered a DME order for home oxygen because the diagnosis and testing requires the patient to have supplemental oxygen. Condition will improve or be benefited by oxygen use. The patient is  able to perform good mobility in a home setting and therefore does require the use of a portable oxygen system. The need for this equipment was discussed with the patient and she understands and is in agreement. Plan:  · Continue intensive PT/OT/SLP/RT inpatient rehabilitation program at least 3 hours per day, 5 days per week in order to improve functional status prior to discharge. Family education and training will be completed. Equipment evaluations and recommendations will be completed as appropriate. · Rehabilitation nursing continue to be involved for bowel, bladder, skin, and pain management. Nursing will also provide education and training to patient and family. · Prophylaxis:  DVT: Lovenox, KENA stocking, intermittent pneumatic compression device. GI: Colace, FiberCon, Dulcolax suppository as needed, milk of magnesium as needed, GlycoLax as needed, Senokot as needed.   · Pain: Tylenol as needed; Voltaren gel as needed; heat pad application as needed  · Continue aspirin, Crestor for coronary artery disease  · Continue Pepcid for gastric protection  · Continue Lasix for hypertension  · Continue Crestor for hyperlipidemia  · Continue glipizide for diabetes mellitus  · Continue albuterol inhaler as needed for COVID-19 pneumonia  · Melatonin as needed for insomnia  · Home oxygen evaluation  · Nutrition:   Continue current diet  · Bladder: Monitoring signs or symptoms of UTI  · Bowel: Monitoring signs or symptoms of constipation  ·  and case management for coordination of care and discharge planning      Missed Therapy Time:  · None      Leah Ku MD

## 2022-01-26 NOTE — PLAN OF CARE
Care plan reviewed with patient and verbalize understanding of the plan of care and contribute to goal setting. Problem: Falls - Risk of:  Goal: Will remain free from falls  Description: Will remain free from falls  Outcome: Ongoing  Goal: Absence of physical injury  Description: Absence of physical injury  Outcome: Ongoing    Appropriate use of call light. Ambulates gait belt and walker. Problem: Skin Integrity:  Goal: Will show no infection signs and symptoms  Description: Will show no infection signs and symptoms  Outcome: Ongoing  Goal: Absence of new skin breakdown  Description: Absence of new skin breakdown  Outcome: Ongoing    No s/sx of infection. Pt afebrile. Pt repositions self as needed. Problem: Bleeding:  Goal: Will show no signs and symptoms of excessive bleeding  Description: Will show no signs and symptoms of excessive bleeding  Outcome: Ongoing    No signs of bleeding this shift.

## 2022-01-27 PROCEDURE — 97530 THERAPEUTIC ACTIVITIES: CPT

## 2022-01-27 PROCEDURE — 99232 SBSQ HOSP IP/OBS MODERATE 35: CPT | Performed by: PHYSICAL MEDICINE & REHABILITATION

## 2022-01-27 PROCEDURE — 1180000000 HC REHAB R&B

## 2022-01-27 PROCEDURE — 97110 THERAPEUTIC EXERCISES: CPT

## 2022-01-27 PROCEDURE — 97535 SELF CARE MNGMENT TRAINING: CPT

## 2022-01-27 PROCEDURE — 6360000002 HC RX W HCPCS: Performed by: FAMILY MEDICINE

## 2022-01-27 PROCEDURE — 97116 GAIT TRAINING THERAPY: CPT

## 2022-01-27 PROCEDURE — 6370000000 HC RX 637 (ALT 250 FOR IP): Performed by: PHYSICAL MEDICINE & REHABILITATION

## 2022-01-27 PROCEDURE — 2500000003 HC RX 250 WO HCPCS: Performed by: PHYSICAL MEDICINE & REHABILITATION

## 2022-01-27 RX ORDER — CALCIUM POLYCARBOPHIL 625 MG 625 MG/1
625 TABLET ORAL DAILY
Refills: 4 | COMMUNITY
Start: 2022-01-28 | End: 2022-07-07

## 2022-01-27 RX ORDER — FAMOTIDINE 20 MG/1
20 TABLET, FILM COATED ORAL 2 TIMES DAILY
Qty: 60 TABLET | Refills: 3 | Status: SHIPPED | OUTPATIENT
Start: 2022-01-27 | End: 2022-07-12

## 2022-01-27 RX ORDER — DOCUSATE SODIUM 100 MG/1
100 CAPSULE, LIQUID FILLED ORAL 2 TIMES DAILY
Qty: 60 CAPSULE | Refills: 3 | Status: ON HOLD | OUTPATIENT
Start: 2022-01-27 | End: 2022-06-08 | Stop reason: HOSPADM

## 2022-01-27 RX ORDER — HYDROXYZINE PAMOATE 25 MG/1
25 CAPSULE ORAL 4 TIMES DAILY PRN
Qty: 60 CAPSULE | Refills: 1 | Status: ON HOLD | OUTPATIENT
Start: 2022-01-27 | End: 2022-08-13 | Stop reason: HOSPADM

## 2022-01-27 RX ORDER — SENNA PLUS 8.6 MG/1
1 TABLET ORAL 2 TIMES DAILY PRN
Qty: 60 TABLET | Refills: 1 | Status: SHIPPED | OUTPATIENT
Start: 2022-01-27 | End: 2022-05-27

## 2022-01-27 RX ORDER — ALBUTEROL SULFATE 90 UG/1
2 AEROSOL, METERED RESPIRATORY (INHALATION) EVERY 6 HOURS PRN
Qty: 18 G | Refills: 3 | Status: SHIPPED | OUTPATIENT
Start: 2022-01-27

## 2022-01-27 RX ORDER — FUROSEMIDE 40 MG/1
40 TABLET ORAL DAILY
Qty: 60 TABLET | Refills: 3 | Status: SHIPPED | OUTPATIENT
Start: 2022-01-28

## 2022-01-27 RX ORDER — GLIPIZIDE 5 MG/1
5 TABLET ORAL 2 TIMES DAILY WITH MEALS
Qty: 60 TABLET | Refills: 3 | Status: ON HOLD | OUTPATIENT
Start: 2022-01-27 | End: 2022-03-23 | Stop reason: SDUPTHER

## 2022-01-27 RX ORDER — LANOLIN ALCOHOL/MO/W.PET/CERES
6 CREAM (GRAM) TOPICAL NIGHTLY
Qty: 60 TABLET | Refills: 3 | Status: SHIPPED | OUTPATIENT
Start: 2022-01-27 | End: 2022-07-07

## 2022-01-27 RX ADMIN — DOCUSATE SODIUM 100 MG: 100 CAPSULE, LIQUID FILLED ORAL at 08:33

## 2022-01-27 RX ADMIN — GLIPIZIDE 5 MG: 5 TABLET ORAL at 17:07

## 2022-01-27 RX ADMIN — ENOXAPARIN SODIUM 30 MG: 100 INJECTION SUBCUTANEOUS at 08:32

## 2022-01-27 RX ADMIN — DOCUSATE SODIUM 100 MG: 100 CAPSULE, LIQUID FILLED ORAL at 20:28

## 2022-01-27 RX ADMIN — MICONAZOLE NITRATE: 2 POWDER TOPICAL at 20:28

## 2022-01-27 RX ADMIN — ROSUVASTATIN 10 MG: 10 TABLET, FILM COATED ORAL at 20:28

## 2022-01-27 RX ADMIN — ACETAMINOPHEN 650 MG: 325 TABLET ORAL at 20:28

## 2022-01-27 RX ADMIN — FAMOTIDINE 20 MG: 20 TABLET, FILM COATED ORAL at 20:28

## 2022-01-27 RX ADMIN — FAMOTIDINE 20 MG: 20 TABLET, FILM COATED ORAL at 08:32

## 2022-01-27 RX ADMIN — GLIPIZIDE 5 MG: 5 TABLET ORAL at 08:33

## 2022-01-27 RX ADMIN — FUROSEMIDE 40 MG: 40 TABLET ORAL at 08:32

## 2022-01-27 RX ADMIN — MICONAZOLE NITRATE: 2 POWDER TOPICAL at 08:33

## 2022-01-27 RX ADMIN — CALCIUM POLYCARBOPHIL 625 MG: 625 TABLET, FILM COATED ORAL at 08:32

## 2022-01-27 RX ADMIN — ASPIRIN 81 MG CHEWABLE TABLET 81 MG: 81 TABLET CHEWABLE at 08:33

## 2022-01-27 RX ADMIN — Medication 6 MG: at 20:28

## 2022-01-27 ASSESSMENT — PAIN DESCRIPTION - LOCATION: LOCATION: BACK

## 2022-01-27 ASSESSMENT — PAIN SCALES - GENERAL
PAINLEVEL_OUTOF10: 3
PAINLEVEL_OUTOF10: 3
PAINLEVEL_OUTOF10: 0

## 2022-01-27 ASSESSMENT — PAIN DESCRIPTION - FREQUENCY: FREQUENCY: CONTINUOUS

## 2022-01-27 ASSESSMENT — PAIN DESCRIPTION - ONSET: ONSET: ON-GOING

## 2022-01-27 NOTE — PROGRESS NOTES
[facility] PHYSICAL THERAPY  DAILY NOTE  [address] [place] [address]    Time In: 1100  Time Out: 1230  Timed Code Treatment Minutes: 90 Minutes  Minutes: 90          Date: 2022  Patient Name: Hira Lujan,  Gender:  female        MRN: [id number]  : 1950  (73 y.o.)     Referring Practitioner: Tejal Rasheed MD  Diagnosis: Personal history of COVID-19  Additional Pertinent Hx: Per Acute notes, \"Kaleigh Butcher is a 73-year-old white female who presented to [facility] on 2021 with complaints of shortness of breath she has a past medical history lifetime non-smoker, CAD with stent placement in , hypertension, diabetes mellitus, dyslipidemia. Per report she presented to [facility] on  with complaints of hypoxia and shortness of breath. She was initially placed on 4 L nasal cannula.  patient had persistent hypoxia and was transitioned to high flow nasal cannula. On  patient was transitioned to BiPAP and transferred to [address] [place] [address] ICU for intubation. Pt extubated . \"     Prior Level of Function:  Lives With: Spouse  Type of Home: House  Home Layout: One level  Home Access: Stairs to enter without rails  Entrance Stairs - Number of Steps: 1 step (very small)  Home Equipment:  (none)   Bathroom Shower/Tub: Walk-in shower (+ jaquazi)  Bathroom Toilet: Handicap height  Bathroom Equipment: Built-in shower seat (pt stating she has a bench right outside shower as well to sit on dry off)  Bathroom Accessibility: Accessible    ADL Assistance: Independent  Homemaking Assistance: Independent  Ambulation Assistance: Independent  Transfer Assistance: Independent  Active : Yes  IADL Comments: 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  Pt stating her children all live close and would isometric hip adduction and Seated abduction/adduction. Exercises were completed for increased independence with functional mobility. *educated on HEP and provided handouts for home of LE exercises: supine/seated/standing. Functional Outcome Measures: Completed   IRF-JOYCE completed 1/27    ASSESSMENT:  Assessment: Patient progressing toward established goals. Activity Tolerance:  Patient tolerance of  treatment: good. Equipment Recommendations:Equipment Needed: Yes (Ordered RW on 1/25)  Other: . Discharge Recommendations: Home with Assist as Needed and Home with Home Health PT  Plan: Times per week: 5x/wk 90 min; 1x/wk 30 min  Current Treatment Recommendations: Strengthening,Positioning,Patient/Caregiver Education & Training,Safety Education & Training,Balance Training,Endurance Training,Functional Mobility Training,Neuromuscular Re-education,Gait Training,Transfer Training,Home Exercise Program,Equipment Evaluation, Education, & procurement    Patient Education  Patient Education: Plan of Care, Home Exercise Program, Precautions/Restrictions, [employer], Transfers, Gait, Stairs, Car Transfers, Up in Chair for Nancy Amaro, Verbal Exercise Instruction    Goals:  Patient goals : to get stronger  Short term goals  Time Frame for Short term goals: 1 week  Short term goal 1: Patient to ambulate at least 100 feet with LRAD with Supervision in order to assist with home mobility. Short term goal 2: Patient to ascend/ descend 1 step with 1 handrail with CGA in order to assist with getting into and out of home. Short term goal 3: Patient to ambulate over uneven surfaces with LRAD with Supervision in order to assist with community mobility. Short term goal 4: Patient to demonstrate dynamic standing balance without LOB with ability to tolerate standing at least 3 minutes in order to assist with safety with standing functional tasks.   Short term goal 5: .  Long term goals  Time Frame for Long term goals : 3

## 2022-01-27 NOTE — DISCHARGE SUMMARY
Physical Medicine & Rehabilitation   Discharge Summary     Patient Identification:  Maricel Hutchison  : 1950  Admit date: 2022  Discharge date: 2022   Attending provider: Tina Marie MD        Primary care provider: Dayanna Jones     Discharge Diagnoses:   Critical illness myopathy   Debility/physical decondition secondary to ATCDL-37 pneumonia, complicated by bacterial pneumonia, bradycardia, and decubitus ulcer  History of COVID-19 pneumonia with acute hypoxic respiratory failure requiring intubation, complicated by Klebsiella oxytocin bacterial pneumonia  Mild dysphagia/moderate pharyngeal dysphagia  Mild cognitive impairment  Hypertension  Diabetes mellitus type 2  History of coronary artery disease requiring coronary artery stenting  Severe cardiomyopathy with reduced ejection fraction  Obesity  Right buttock pain likely due to gluteus medius muscle strain      Discharge Functional Status:    Physical therapy:  Bed Mobility:  Rolling to Left: Modified Independent   Rolling to Right: Modified Independent   Supine to Sit: Modified Independent  Sit to Supine: Modified Independent   Scooting: Modified Independent     Transfers:  Sit to Stand: Modified Independent  Stand to Sit:Modified Independent  To/From Bed and Chair: Modified Independent, with RW  Car:Modified Independent     Ambulation:  Supervision  Distance: level: ~110ft, 15ft, 12ft, 100ft, 80ft   Ramp: 10ft  Surface: Level Tile and Ramp  Device:Rolling Walker  Gait Deviations:   Forward Flexed Posture, Slow Haylee, Decreased Step Length Bilaterally, Decreased Gait Speed and heavy reliance of BUEs on RW, fatigues easily due to SOB  *monitored O2 sats throughout.     Stairs:  Contact Guard Assistance  Number of Steps: 1 (porch step)  Height: 6\" step with [address] Assistance  Number of Steps: 4  Height: 6\" step with Bilateral Handrails     Balance:  Dynamic Standing Balance: Modified Independent, Patient stood at [facility] and used reacher to  object from floor level.     Dynamic gait:             -toe taps onto cones with R/L LE sequencing and side-stepping. Noted difficulty lifting L LE to top of cone moreso vs RLE, BUE support at rail    PT Equipment Recommendations  Equipment Needed: Yes (Ordered RW on 1/25)  Other: ., Assessment: Pt is a 74 yo female that is limited by weakness and deconditioning due to extended illness and intubation x 2 weeks. Pt was generally independent in PLOF and is requiring Min to Mod A for safety with transfers, bed mobility, and very limited ambulation. Pt would benefit from continued skilled PT in the Inpt Rehab setting for addressing strength, endurance building, and functional mobility before being safely able to return to her home. Occupational therapy:  ADLs:  EATING:Independent. [place] CARE Score: 6. ORAL HYGIENE:Independent. with standing at the sink to brush teeth. CARE Score: 6. SHOWERING/BATHING:Independent. with pt sitting on shower chair to complete. CARE Score: 6. UPPER BODY DRESSING:Independent. [place] CARE Score: 6. LOWER BODY DRESSING:Independent. with sitting in chair to complete. CARE Score: 6. FOOTWEAR:Independent  with using AE. CARE Score: 6. GROOMING: Modified independent  TOILETING HYGIENE:Independent. [place] CARE Score: 6. TOILET TRANSFER: Independent. [place] CARE Score: 6.        BALANCE:  Sitting Balance:  Modified Independent. Standing Balance: Modified Independent. x1 minute standing at RW talking with therapist.     TRANSFERS:  Sit to Stand:  Modified Independent. Stand to Sit: Modified Independent     FUNCTIONAL MOBILITY:  Assistive Device: Rolling Walker   Assist Level:  Modified Independent. Distance:   Completed functional mobility within pt room at slow pace, no LOB noted. Pt requires no cues for walker safety- seated rest break after trial of mobility, min fatigue noted.      Assistive Device: Rolling Walker  Assist Level: Modified Independent. Distance: To and from shower room    Assistive Device: Rolling Walker  Assist Level:  Spouse donned gait belt and providing supervision during ambulation. Distance:  48 feet x2,       Family education session held with patient's spouse regarding patient's current level of independence with activities of daily living; current transfers and mobility ability; monitoring ADLs at home to ensure patient able to translate skills to new environment; patient's progress with ADL and IADL from initial evaluation to today, role and goals of continued home health therapy and recommendation for no driving until completion of driving evaluation. Also discussed being homebound while on home health services. Patient and family verbalized understanding. No further questions at this time. ADDITIONAL ACTIVITIES:  Pt requested ambulation in room to write thank you message on care board. Pt using RW to reach into various planes to higher levels while completing task. Pt completed with [place]  for balance and min cues for safe technique or walker safety while reaching into cupboards. Completed to increase kitchen safety and facilitate functional reaching required for simple meal prep tasks. Equipment Recommendations: Equipment Needed: No  Mobility Devices: Walker  Other: Patient has a shower chair with grab bars, a ADA height toilet and a reacher. Family is obtaining a soft sock-aid, shoe horn, additional grab bars near shower and toilet, and walker tray. No other DME needed. Assessment: Sanna Muhammad is making steady progress on IP Rehab. Sanna Muhammad has improved to a SBA level for her functional transfers and short distance mobility, her 02 sat's will drop into high 80's with mobility but she recovers quickly into 90's. Sanna Muhammad can complete UB ADLs with set-up A or SBA for item retireval and LB ADLs with SBA for balance with use of LHAE.  Sanna Muhammad can complete IADLs with SBA for balance and min cues for safety and for energy conservation. Nri George cont to be limited by her strength, endurance, and requires cont skilled OT to improve safety and indep with BADL and IADL. Speech therapy:  Patient and nursing staff deny difficulty, reporting continued consistency with consumption of regular solids.      Delayed ~1 week functional recall of facts related to ST -  indep    Reviewed problem solving/safety situations during IADL's in home setting - patient with excellent verbal reasoning and thought organization regarding safe return to gardening and med management without need for any cues. Visual reasoning/scanning - complex attention task addressed via use of newspaper article; patient instructed to identify one target word throughout while comprehending article well enough to provide summary to follow.   Time - 7 minutes  Errors - 2 (identified 23 of 25 targets)  Summary - excellent, with appropriate inclusion of detail  *adequate divided, sustained, and selective attention noted     Remainder of math computation task related to [employer] -  indep  *excellent mental math computation     Complex math computation/comparison involving restaurant ([employer]) menu -  indep, 1/5 mod cues  *plans to complete remainder of task during subsequent session    Comprehension: 7 - Patient understands complex ideas (math/planning)  Expression: 7 - Patient expresses complex ideas/needs  Social Interaction: 6 - Patient requires medication for mood and/or effect  Problem Solvin - Independent with device (e.g. notes, schedules)  Memory: 6 - Patient requires device to recall (e.g. memory book)      Inpatient Rehabilitation Course:   Tone Vera is a 73 y.o. right-handed obese  female with history of hypertension, diabetes mellitus type 2, coronary artery disease requiring coronary artery stenting, status post bilateral eyes cataract surgeries, status post 3  sections, status post hysterectomy, status post hiatal hernia repair, status post sinus surgery, was admitted to inpatient rehabilitation on 1/14/2022 for intensive inpatient management of impairment & disability secondary to critical illness myopathy and disability/physical decondition as result of COVID-19 pneumonia. The patient participated in an aggressive multidisciplinary inpatient rehabilitation program involving 3 hours per day, 5 days per week of rehabilitation. Diabetic management was maximized with diet and medication options to attempt to achieve blood sugar control between . Hypertension management was undertaken with medication management on any patient with systolic blood pressure greater than 796 or diastolic blood pressure greater than 90. Hyperlipidemia was considered and the patient was started or continued on a statin medication for any LDL>100. Appropriate DVT prophylaxis options were considered throughout rehabilitation stay. Dr Rosie Elise followed during the IPR stay for medical management    The patient had an episode of bilateral legs swelling without tenderness on 1/19/2022. Bilateral lower extremities venous Doppler study was done on 1/19/2022 and revealed no DVT. The patient's inpatient rehab course otherwise was uneventful. She tolerated the intensive inpatient rehabilitation treatment well. She gained functional independence in performing ADLs and ambulation with increasing endurance through the intensive rehab treatment. Patient was discharged Home with [facility] in Stable condition.       Consults:   none      Significant Diagnostics:   CBC with Differential:    Lab Results   Component Value Date    WBC 3.6 01/15/2022    RBC 3.56 01/15/2022    HGB 10.8 01/15/2022    HCT 33.3 01/15/2022    PLT 95 01/15/2022    MCV 93.5 01/15/2022    MCH 30.3 01/15/2022    MCHC 32.4 01/15/2022    NRBC 1 01/15/2022    SEGSPCT 55.3 01/15/2022    MONOPCT 10.4 01/15/2022    MONOSABS 0.4 01/15/2022    LYMPHSABS 1.1 01/15/2022 EOSABS 0.1 01/15/2022    BASOSABS 0.0 01/15/2022     CMP:    Lab Results   Component Value Date     01/15/2022    K 3.9 01/15/2022     01/15/2022    CO2 28 01/15/2022    BUN 4 01/15/2022    CREATININE 0.4 01/15/2022    LABGLOM >90 01/15/2022    GLUCOSE 176 01/15/2022    PROT 4.9 01/15/2022    LABALBU 3.3 01/15/2022    CALCIUM 8.8 01/15/2022    BILITOT 0.5 01/15/2022    ALKPHOS 78 01/15/2022    AST 30 01/15/2022    ALT 36 01/15/2022     BMP:    Lab Results   Component Value Date     01/15/2022    K 3.9 01/15/2022     01/15/2022    CO2 28 01/15/2022    BUN 4 01/15/2022    LABALBU 3.3 01/15/2022    CREATININE 0.4 01/15/2022    CALCIUM 8.8 01/15/2022    LABGLOM >90 01/15/2022    GLUCOSE 176 01/15/2022        Recent Labs     01/26/22  0829 01/28/22  0831   POCGLU 198* 219*       Cholesterol Panel:   No results found for requested labs within last 30 days. Bilateral lower extremities venous duplex study (1/19/2022) : Impression   No evidence of a DVT. Patient Instructions: Follow-up visits: See after visit summary from hospitalization         Follow up with Dr. Julian Lemons MD  Specialty: Family Medicine [address] [place] [id number] [phone]   Feb 16 Office Visit with Laura Rodriguez PA-C  Wednesday Feb 16, 2022 8:00 AM  Please arrive 15 minutes prior to appointment, bring photo ID and insurance card. [address]  [address]   [address] [address] [phone]          Follow up with Dr. Edward Ruiz MD  Wednesday Feb 16, 2022  Specialty: Cardiology  Follow Up @ [facility], [address] [place] [address]  [phone]         Discharge Medications:  Current Discharge Medication List           Details   glipiZIDE (GLUCOTROL) 5 MG tablet Take 1 tablet by mouth 2 times daily (with meals)  Qty: 60 tablet, Refills: 3      miconazole (MICOTIN) 2 % powder Apply topically 2 times daily.   Qty: 45 g, Refills: 1      diclofenac sodium (VOLTAREN) 1 % GEL Apply 2 g topically 4 times daily as needed for Pain  Qty: 150 g, Refills: 3      furosemide (LASIX) 40 MG tablet Take 1 tablet by mouth daily  Qty: 60 tablet, Refills: 3      docusate sodium (COLACE) 100 MG capsule Take 1 capsule by mouth 2 times daily  Qty: 60 capsule, Refills: 3      polycarbophil (FIBERCON) 625 MG tablet Take 1 tablet by mouth daily  Refills: 4      senna (SENOKOT) 8.6 MG tablet Take 1 tablet by mouth 2 times daily as needed (Constipation)  Qty: 60 tablet, Refills: 1      melatonin 3 MG TABS tablet Take 2 tablets by mouth nightly  Qty: 60 tablet, Refills: 3      OXYGEN Inhale 2 L/min into the lungs continuous prn (during activities such as walking)  Qty: 1 Canister, Refills: 5              Details   hydrOXYzine (VISTARIL) 25 MG capsule Take 1 capsule by mouth 4 times daily as needed for Anxiety  Qty: 60 capsule, Refills: 1      albuterol sulfate  (90 Base) MCG/ACT inhaler Inhale 2 puffs into the lungs every 6 hours as needed for Wheezing  Qty: 18 g, Refills: 3      famotidine (PEPCID) 20 MG tablet Take 1 tablet by mouth 2 times daily  Qty: 60 tablet, Refills: 3              Details   rosuvastatin (CRESTOR) 10 MG tablet Take 10 mg by mouth daily      nitroGLYCERIN (NITROSTAT) 0.4 MG SL tablet Place 0.4 mg under the tongue every 5 minutes as needed for Chest pain up to max of 3 total doses. If no relief after 1 dose, call 911.      aspirin 81 MG chewable tablet Take 81 mg by mouth daily              Controlled substances monitoring: not applicable.      45 minutes spent preparing the patient for discharge      Yazmin Ohara MD

## 2022-01-27 NOTE — PROCEDURES
A home oxygen evaluation has been completed. [x]Patient is an inpatient. It is expected that the patient will be discharged within the next 48 hours. Qualified provider to write order for home prescription if patient qualifies. Social service/care managers will arrange for home oxygen. If patient is active, arrange for Home Medical supplier to assess for Oxygen Conserving Device per pulse oximetry. []Patient is an outpatient. Results will be faxed to the ordering provider. Qualified provider to write order for home prescription if patient qualifies and arranges for home oxygen. Patient was on room air. SpO2 was 94% on room air at rest. SpO2 dropped to 83% after walking to bathroom and having BM. Place on 1 liter N/C, pox 88%. Increased O2 2 liters, pox 92% . Patient was walked for 3 minutes became fatigued, assisted to chair, pox 91-92%. Patient's SpO2 was below 89% and qualified for home oxygen. Oxygen was applied at 2 lpm via nasal cannula to maintain a SpO2 between 90-92% while walking. Assisted back to room, up in chair. At rest 92-94%. Call light within reach. Reported to floor nurse.

## 2022-01-27 NOTE — PROGRESS NOTES
[address]  Occupational Therapy  Daily Note  Time:   Time In: 0900  Time Out: 1000  Timed Code Treatment Minutes: 60 Minutes  Minutes: 60          Date: 2022  Patient Name: Kina Avery,   Gender: female      Room: [facility]/068-A  MRN: [id number]  : 1950  (73 y.o.)  Referring Practitioner: Dr. Sena Rock  Diagnosis: personal history of COVID 19  Additional Pertinent Hx: Lady Goodman is a 80-year-old white female who presented to [facility] on 2021 with complaints of shortness of breath she has a past medical history lifetime non-smoker, CAD with stent placement in , hypertension, diabetes mellitus, dyslipidemia. Per report she presented to [facility] on  with complaints of hypoxia and shortness of breath. She was initially placed on 4 L nasal cannula.  patient had persistent hypoxia and was transitioned to high flow nasal cannula. On  patient was transitioned to BiPAP and transferred to [facility] ICU for intubation. Pt extubated . Restrictions/Precautions:  Restrictions/Precautions: General Precautions,Fall Risk  Position Activity Restriction  Other position/activity restrictions: out of COVID isolation 22, monitor BP     SUBJECTIVE: Pt sitting in chair upon arrival, pt agreeable to OT session    PAIN: 0/10:     Vitals: Nurse checked vitals prior to session    COGNITION: Decreased Recall, Inattention and Impulsive      EATING:Independent. Kye Mutton CARE Score: 6. ORAL HYGIENE:Independent. with standing at the sink to brush teeth. CARE Score: 6. TOILETING HYGIENE:Independent. Krystalynn Mutton CARE Score: 6. SHOWERING/BATHING:Independent. with pt sitting on shower chair to complete. CARE Score: 6. UPPER BODY DRESSING:Independent. Roslynn Mutton CARE Score: 6. LOWER BODY DRESSING:Independent. with sitting in chair to complete. CARE Score: 6. FOOTWEAR:Independent  with using AE.   CARE Score: 6. TOILET TRANSFER: Independent. [facility] CARE Score: 6. BALANCE:  Standing Balance: Modified Independent. with pt completing grooming routine standing at the sink with RW    BED MOBILITY:  Not Tested    TRANSFERS:  Sit to Stand:  Modified Independent. from all surfaces  Stand to Sit: Modified Independent. from all surfaces    FUNCTIONAL MOBILITY:  Assistive Device: Rolling Walker  Assist Level:  Modified Independent. Distance: To and from shower room    ASSESSMENT:     Activity Tolerance:  Patient tolerance of  treatment: good. Discharge Recommendations: Home with Home Health OT  Equipment Recommendations: Equipment Needed: No  Mobility Devices: Walker  Other: Patient has a shower chair with grab bars, a ADA height toilet and a reacher. Family is obtaining a soft sock-aid, shoe horn, additional grab bars near shower and toilet, and walker tray. No other DME needed. Plan: Times per week: 5x week/90 min, 1x week/30 min  Current Treatment Recommendations: Strengthening,Balance Training,Functional Mobility Training,Endurance Training,Home Management Training,Patient/Caregiver Education & Training,Self-Care / ADL,Safety Education & Training    Patient Education  Patient Education: ADL's and Home Safety    Goals  Short term goals  Time Frame for Short term goals: 2-3 days  Short term goal 1: GOAL MET, DISCONTINUE - SEE LTG 3  Short term goal 2: Pt to complete LB ADL tasks with use LHAE as needed with supervisio n  Short term goal 3: Pt will complete demo competency with written HEP for BUE strengthening exercises with min cue'ing to increase strength needed for ADL tasks  Short term goal 4: Pt to complete sit to stand transfers with supervision and no no cues for safety to increase indep with completing toilet transfers  Short term goal 5: Pt will ambulate to and from the bathroom with S using RW to improve access to the bathroom.   Long term goals  Time Frame for Long term goals : 4 days  Long term goal 1: Pt to complete BADL routine with Supervision and no cues for safety  Long term goal 2: Pt to complete sit to stand transfers from various surfaces including toilet with Supervision and no ceus for safety  Long term goal 3: Pt to demo standing balance with unilateral UE hand release with Supervision to complete clothing management after dressing and toielting    Following session, patient left in safe position with all fall risk precautions in place.

## 2022-01-27 NOTE — PROGRESS NOTES
to Stand:  Modified Independent. Stand to Sit: Modified Independent    FUNCTIONAL MOBILITY:  Assistive Device: Rolling Walker  Assist Level:  Spouse donned gait belt and providing supervision during ambulation. Distance:  48 feet x2,      Family education session held with patient's spouse regarding patient's current level of independence with activities of daily living; current transfers and mobility ability; monitoring ADLs at home to ensure patient able to translate skills to new environment; patient's progress with ADL and IADL from initial evaluation to today, role and goals of continued home health therapy and recommendation for no driving until completion of driving evaluation. Also discussed being homebound while on home health services. Patient and family verbalized understanding. No further questions at this time. ASSESSMENT:  Activity Tolerance:  Patient tolerance of  treatment: good. Discharge Recommendations: Home with Home Health OT  Equipment Recommendations: Equipment Needed: No  Mobility Devices: Walker  Other: Patient has a shower chair with grab bars, a ADA height toilet and a reacher. Family is obtaining a soft sock-aid, shoe horn, additional grab bars near shower and toilet, and walker tray. No other DME needed.   Plan: Times per week: 5x week/90 min, 1x week/30 min  Current Treatment Recommendations: Strengthening,Balance Training,Functional Mobility Training,Endurance Training,Home Management Training,Patient/Caregiver Education & Training,Self-Care / ADL,Safety Education & Training    Patient Education  Patient Education: ADL's and Home Safety    Goals  Short term goals  Time Frame for Short term goals: 2-3 days  Short term goal 1: GOAL MET, DISCONTINUE - SEE LTG 3  Short term goal 2: Pt to complete LB ADL tasks with use LHAE as needed with supervisio n  Short term goal 3: Pt will complete demo competency with written HEP for BUE strengthening exercises with min cue'ing to increase strength needed for ADL tasks  Short term goal 4: Pt to complete sit to stand transfers with supervision and no no cues for safety to increase indep with completing toilet transfers  Short term goal 5: Pt will ambulate to and from the bathroom with S using RW to improve access to the bathroom. Long term goals  Time Frame for Long term goals : 4 days  Long term goal 1: Pt to complete BADL routine with Supervision and no cues for safety  Long term goal 2: Pt to complete sit to stand transfers from various surfaces including toilet with Supervision and no ceus for safety  Long term goal 3: Pt to demo standing balance with unilateral UE hand release with Supervision to complete clothing management after dressing and toielting    Following session, patient left in safe position with all fall risk precautions in place.

## 2022-01-27 NOTE — PROGRESS NOTES
[address] [address]  Recreational Therapy  Daily Note  [address]    Time Spent with Patient: 30 minutes    Date:  1/27/2022       Patient Name: Margy Loya      MRN: [id number]      YOB: 1950 (73 y.o.)       Gender: female  Diagnosis: personal history of COVID 23  Referring Practitioner: Dr. Liz Rachel    RESTRICTIONS/PRECAUTIONS:  Restrictions/Precautions: General Precautions,Fall Risk  Vision: Within Functional Limits  Hearing: Within functional limits    PAIN: 0    SUBJECTIVE:  I am looking forward to going home tomorrow     OBJECTIVE:  Pt looking forward to going home tomorrow-talked about her home and how she wants her  to finish their basement so eventually his mom and dad can come and live with them-talked about maybe getting a dog some day and talked about her family -very talkative-very pleasant- in to visit too          Patient Education  New Education Provided: Importance of Leisure,     Electronically signed by: LIBBY Mccurdy  Date: 1/27/2022

## 2022-01-28 VITALS
BODY MASS INDEX: 34.95 KG/M2 | TEMPERATURE: 97.7 F | HEART RATE: 77 BPM | DIASTOLIC BLOOD PRESSURE: 75 MMHG | RESPIRATION RATE: 18 BRPM | OXYGEN SATURATION: 92 % | WEIGHT: 189.9 LBS | HEIGHT: 62 IN | SYSTOLIC BLOOD PRESSURE: 130 MMHG

## 2022-01-28 LAB — GLUCOSE BLD-MCNC: 219 MG/DL (ref 70–108)

## 2022-01-28 PROCEDURE — 97530 THERAPEUTIC ACTIVITIES: CPT

## 2022-01-28 PROCEDURE — 99239 HOSP IP/OBS DSCHRG MGMT >30: CPT | Performed by: PHYSICAL MEDICINE & REHABILITATION

## 2022-01-28 PROCEDURE — 6360000002 HC RX W HCPCS: Performed by: FAMILY MEDICINE

## 2022-01-28 PROCEDURE — 97116 GAIT TRAINING THERAPY: CPT

## 2022-01-28 PROCEDURE — 6370000000 HC RX 637 (ALT 250 FOR IP): Performed by: PHYSICAL MEDICINE & REHABILITATION

## 2022-01-28 PROCEDURE — 82948 REAGENT STRIP/BLOOD GLUCOSE: CPT

## 2022-01-28 RX ADMIN — DOCUSATE SODIUM 100 MG: 100 CAPSULE, LIQUID FILLED ORAL at 08:35

## 2022-01-28 RX ADMIN — ENOXAPARIN SODIUM 30 MG: 100 INJECTION SUBCUTANEOUS at 08:35

## 2022-01-28 RX ADMIN — ASPIRIN 81 MG CHEWABLE TABLET 81 MG: 81 TABLET CHEWABLE at 08:35

## 2022-01-28 RX ADMIN — FAMOTIDINE 20 MG: 20 TABLET, FILM COATED ORAL at 08:35

## 2022-01-28 RX ADMIN — FUROSEMIDE 40 MG: 40 TABLET ORAL at 08:35

## 2022-01-28 RX ADMIN — MICONAZOLE NITRATE: 2 POWDER TOPICAL at 08:34

## 2022-01-28 RX ADMIN — CALCIUM POLYCARBOPHIL 625 MG: 625 TABLET, FILM COATED ORAL at 08:35

## 2022-01-28 RX ADMIN — GLIPIZIDE 5 MG: 5 TABLET ORAL at 07:39

## 2022-01-28 ASSESSMENT — PAIN SCALES - GENERAL: PAINLEVEL_OUTOF10: 0

## 2022-01-28 NOTE — PROGRESS NOTES
[facility]  Recreational Therapy  Discharge Note  Inpatient Rehabilitation Unit         Date:  1/28/2022       Patient Name: Gemini Grantster      MRN: [id number]       YOB: 1950 (73 y.o.)       Gender: female  Diagnosis: personal history of COVID 23  Referring Practitioner: Dr. Isaias Gilliland    Patient discharged from Recreational Therapy at this time. See recreational therapy notes for details.     Electronically signed by: LIBBY Painter  Date: 1/28/2022

## 2022-01-28 NOTE — PLAN OF CARE
RN  Outcome: Met This Shift     Problem: Breathing Pattern - Ineffective:  Goal: Ability to achieve and maintain a regular respiratory rate will improve  Description: Ability to achieve and maintain a regular respiratory rate will improve  1/28/2022 0926 by Edwina Andrews LPN  Outcome: Completed  1/27/2022 2347 by Ezio Bundy RN  Outcome: Met This Shift     Problem: Serum Glucose Level - Abnormal:  Goal: Ability to maintain appropriate glucose levels has stabilized  Description: Ability to maintain appropriate glucose levels has stabilized  1/28/2022 0926 by Edwina Andrews LPN  Outcome: Completed  1/27/2022 2347 by Ezio Bundy RN  Outcome: Met This Shift     Problem: IP DRESSINGS LOWER EXTREMITIES  Goal: LTG - patient will dress lower body with or without assistive device  1/28/2022 0926 by Edwina Andrews LPN  Outcome: Completed  1/27/2022 2347 by Ezio Bundy RN  Outcome: Met This Shift     Problem: IP TOILETING  Goal: LTG - Patient will utilize adaptive techniques/equipment to complete daily toileting tasks  1/28/2022 0926 by Edwina Andrews LPN  Outcome: Completed  1/27/2022 2347 by Ezio Bundy RN  Outcome: Met This Shift     Problem: IP TRANSFERS  Goal: LTG - patient will transfer from one surface to another  1/28/2022 0926 by Edwina Andrews LPN  Outcome: Completed  1/27/2022 2347 by Ezio Bundy RN  Outcome: Met This Shift     Problem: IP COMMUNICATION/DYSARTHRIA  Goal: LTG - Patient will effectively communicate in all situations with the use of compensatory strategies  1/28/2022 0926 by Edwina Andrews LPN  Outcome: Completed  1/27/2022 2347 by Ezio Bundy RN  Outcome: Met This Shift     Problem: IP SWALLOWING  Goal: LTG - patient will tolerate the least restrictive diet consistency to allow for safe consumption of daily meals  1/28/2022 0926 by Edwina Andrews LPN  Outcome: Completed  1/27/2022 2347 by Ezio Bundy RN  Outcome: Met This Shift     Problem: IP MOBILITY  Goal: LTG - patient will demonstrate safe mobility requirements  1/28/2022 0926 by Pari Acosta LPN  Outcome: Completed  1/27/2022 2347 by Dolores Naik RN  Outcome: Met This Shift     Problem: DISCHARGE BARRIERS  Goal: Patient's continuum of care needs are met  1/28/2022 0926 by Pari Acosta LPN  Outcome: Completed  1/27/2022 2347 by Dolores Naik RN  Outcome: Met This Shift  1/27/2022 2044 by KIARRA Niño  Note: Team conference held Wednesday, 01/26/2022. Recommendations of the team were explained to the patient and spouse, Naida Nguyen, by KIARRA Connolly, and Dr. Adrienne Clifton. Team is recommending that patient continue on acute inpatient rehab for two more days, with expected discharge date of Friday, 01/28/2022. Patient's spouse, Naida Nguyen, has observed therapy sessions in preparation for discharge home. Following discharge, team is recommending home health care services for RN/PT/OT. Patient indicates preference for [address] [place] [address]. Care plan reviewed with patient and spouse, Naida Nguyen. Patient and spouse, Naida Nguyen, verbalized understanding of the plan of care and contributed to goal setting. SW to follow and maintain involvement in discharge planning.

## 2022-01-28 NOTE — PROGRESS NOTES
[facility] PHYSICAL THERAPY  Discharge Note  [facility]    Time In: 0800  Time Out: 0830  Timed Code Treatment Minutes: 30 Minutes  Minutes: 30          Date: 2022  Patient Name: Tone Vera,  Gender:  female        MRN: [id number]  : 1950  (73 y.o.)     Referring Practitioner: Sri Gonzalez MD  Diagnosis: Personal history of COVID-19  Additional Pertinent Hx: Per Acute notes, \"Kaleigh Chandra is a 72-year-old white female who presented to [facility] on 2021 with complaints of shortness of breath she has a past medical history lifetime non-smoker, CAD with stent placement in , hypertension, diabetes mellitus, dyslipidemia. Per report she presented to [facility] on  with complaints of hypoxia and shortness of breath. She was initially placed on 4 L nasal cannula.  patient had persistent hypoxia and was transitioned to high flow nasal cannula. On  patient was transitioned to BiPAP and transferred to [facility] ICU for intubation. Pt extubated . \"     Prior Level of Function:  Lives With: Spouse  Type of Home: House  Home Layout: One level  Home Access: Stairs to enter without rails  Entrance Stairs - Number of Steps: 1 step (very small)  Home Equipment:  (none)    Vitals: O2 at rest 94% and after ambulation dropped to 87% and able to return to 92% after 1 minute rest break    Restrictions/Precautions:  Restrictions/Precautions: General Precautions,Fall Risk  Position Activity Restriction  Other position/activity restrictions: out of COVID isolation 22, monitor BP    SUBJECTIVE: Patient seated in wheelchair upon arrival. Patient given education on transport chair proper size, where to purchase and for use in community when needed. Patient very talkative throughout session with frequent redirection required.  Patient given education on use of pulse ox while moving throughout home in order to safely self monitor oxygen levels. PAIN: 0/10    OBJECTIVE:  Bed Mobility:  Not Tested    Transfers:  Sit to Stand: Modified Independent  Stand to Sit:Modified Independent    Ambulation:  Modified Independent  Distance: 100 feet and 30 feet  Surface: Level Tile and Uneven Surface  Device:Rolling Walker  Gait Deviations: Forward Flexed Posture and Decreased Gait Speed    Stairs:  Stand By Assistance  Number of Steps: 2  Height: 6\" step with Bilateral Handrails      Functional Outcome Measures: Not completed       ASSESSMENT:  Assessment:  Patient overall made progress with skilled PT treatment and met 3/3 STG'g and 0/2 LTG's. Patient is Mod I for bed mobility, transfers and ambulation with RW. Patient continues to demonstrate decreased BLE strength and endurance levels which limit her tolerance to functional mobility. Patient requires CGA for stair negotiation with increased time required to complete. Patient to be discharged home with home health PT. Activity Tolerance:  Patient tolerance of  treatment: good. Equipment Recommendations:Equipment Needed: Yes (Ordered RW on 1/25)    Discharge Recommendations:  Discharge Recommendations: Home with Home Health PT    Plan: Home with Home Health PT    Patient Education  Patient Education: Equipment Education, Activity Pacing,  - Patient Verbalized Understanding, - Patient Requires Continued Education    Goals:  Patient goals : to get stronger  Short term goals  Time Frame for Short term goals: 1 week  Short term goal 1: Patient to ambulate at least 100 feet with LRAD with Supervision in order to assist with home mobility.- GOAL MET  Short term goal 2: Patient to ascend/ descend 1 step with 1 handrail with CGA in order to assist with getting into and out of home. - GOAL MET  Short term goal 3: Patient to ambulate over uneven surfaces with LRAD with Supervision in order to assist with community mobility.-  GOAL MET  Short term goal 4: Patient to demonstrate dynamic standing balance without LOB with ability to tolerate standing at least 3 minutes in order to assist with safety with standing functional tasks. - GOAL MET    Long term goals  Time Frame for Long term goals : 3 weeks  Long term goal 1: Patient to ambulate at least 150 feet with LRAD with Mod I in order to assist with home mobility.- GOAL NOT MET  Long term goal 2: Patient  to ascend/descend 2 steps without handrails with SBA in order to assist with getting into and out of home. - GOAL NOT MET

## 2022-01-28 NOTE — DISCHARGE INSTR - COC
Continuity of Care Form    Patient Name: Wesly Carpenter   :  1950  MRN:  [id number]    Admit date:  2022  Discharge date:  ***    Code Status Order: Full Code   Advance Directives:      Admitting Physician:  Mayelin Sorensen MD  PCP: Corey Kendall    Discharging Nurse: [facility] Unit/Room#: 7E-68/068-A  Discharging Unit Phone Number: ***    Emergency Contact:   Extended Emergency Contact Information  Primary Emergency Contact: Dayne Crum  Mobile Phone: [phone]  Relation: Child  Secondary Emergency Contact: Lillian Soriano  Mobile Phone: [phone]  Relation: Spouse    Past Surgical History:  Past Surgical History:   Procedure Laterality Date    CATARACT REMOVAL Bilateral 2020     SECTION      3 times    CORONARY ANGIOPLASTY WITH STENT PLACEMENT      stenting twice    HIATAL HERNIA REPAIR      late     HYSTERECTOMY, TOTAL ABDOMINAL      in [address]      in        Immunization History: There is no immunization history on file for this patient. Active Problems:  Patient Active Problem List   Diagnosis Code    COVID-19 U07.1    Hypoxia R09.02    Acute respiratory failure with hypoxia (HCC) J96.01    Debility R53.81    Physical deconditioning R53.81    History of COVID-19 Z86.16    Dysarthria R47.1    Primary hypertension I10    Type 2 diabetes mellitus ([address] E11.9    Obesity (BMI 30-39. 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       Isolation/Infection:   Isolation            No Isolation          Patient Infection Status       Infection Onset Added Last Indicated Last Indicated By Review Planned Expiration Resolved Resolved By    None active    Resolved    COVID-19 21 COVID-19   22 Gladys Astorga RN    +, admit  - 80%            Nurse Assessment:  Last Vital Signs: BP (!) 144/70   Pulse 91   Temp 98.6 °F (37 °C) (Oral)   Resp 16   Ht 5' 2\" (1.575 m)   Wt 196 lb 14.4 oz (89.3 kg)   LMP  (LMP Unknown)   SpO2 93%   Breastfeeding No   BMI 36.01 kg/m²     Last documented pain score (0-10 scale): Pain Level: 3  Last Weight:   Wt Readings from Last 1 Encounters:   01/26/22 196 lb 14.4 oz (89.3 kg)     Mental Status:  {IP PT MENTAL STATUS:20030}    IV Access:  { REGGIE IV ACCESS:396963047}    Nursing Mobility/ADLs:  Walking   {CHP DME LZFK:407167061}  Transfer  {CHP DME KFAH:903951276}  Bathing  {CHP DME NGZD:600409796}  Dressing  {CHP DME FNAE:551639075}  Toileting  {CHP DME COKN:917399664}  Feeding  {P DME FLCV:046543769}  Med Admin  {P DME BQCU:429854057}  Med Delivery   { REGGIE MED Delivery:631040484}    Wound Care Documentation and Therapy:        Elimination:  Continence: Bowel: {YES / QS:49409}  Bladder: {YES / AF:44235}  Urinary Catheter: {Urinary Catheter:414121542}   Colostomy/Ileostomy/Ileal Conduit: {YES / EW:99304}       Date of Last BM: ***    Intake/Output Summary (Last 24 hours) at 1/27/2022 2039  Last data filed at 1/27/2022 1829  Gross per 24 hour   Intake 720 ml   Output --   Net 720 ml     I/O last 3 completed shifts:   In: 1440 [P.O.:1440]  Out: -     Safety Concerns:     [address] Concerns:087749455}    Impairments/Disabilities:      [address] Impairments/Disabilities:749505914}    Nutrition Therapy:  Current Nutrition Therapy:   [address] [employer] Diet List:884258222}    Routes of Feeding: {CHP DME Other Feedings:088669758}  Liquids: {Slp liquid thickness:07388}  Daily Fluid Restriction: {CHP DME Yes amt example:991673286}  Last Modified Barium Swallow with Video (Video Swallowing Test): {Done Not Done GLZD:591222288}    Treatments at the Time of Hospital Discharge:   Respiratory Treatments: ***  Oxygen Therapy:  {Therapy; copd oxygen:44619}  Ventilator:    { CC Vent FIGC:625634941}    Rehab Therapies: {THERAPEUTIC INTERVENTION:8119609992}  Weight Bearing Status/Restrictions: Maximo Roberts CC Weight Bearin}  Other Medical Equipment (for information only, NOT a DME order):  {EQUIPMENT:504562356}  Other Treatments: ***    Patient's personal belongings (please select all that are sent with patient):  {CHP DME Belongings:307484056}    RN SIGNATURE:  {Esignature:344924710}

## 2022-01-28 NOTE — PROGRESS NOTES
[address]  Occupational Therapy  Discharge Note  Time:   Time In: 0700  Time Out: 0730  Timed Code Treatment Minutes: 30 Minutes  Minutes: 30          Date: 2022  Patient Name: Jourdan Trevino,   Gender: female      Room: [facility]/068-A  MRN: [id number]  : 1950  (73 y.o.)  Referring Practitioner: Dr. Trinh Fraser  Diagnosis: personal history of COVID 19  Additional Pertinent Hx: Dimitri Carbajal is a 53-year-old white female who presented to [facility] on 2021 with complaints of shortness of breath she has a past medical history lifetime non-smoker, CAD with stent placement in , hypertension, diabetes mellitus, dyslipidemia. Per report she presented to [facility] on  with complaints of hypoxia and shortness of breath. She was initially placed on 4 L nasal cannula.  patient had persistent hypoxia and was transitioned to high flow nasal cannula. On  patient was transitioned to BiPAP and transferred to [place] ICU for intubation. Pt extubated . Restrictions/Precautions:  Restrictions/Precautions: General Precautions,Fall Risk  Position Activity Restriction  Other position/activity restrictions: out of COVID isolation 22, monitor BP     SUBJECTIVE: Pt seated in w/c upon arrival, agreeable to OT session. Denies questions in prep for d/c today. PAIN: None    Vitals: Oxygen: WNL on room air during short ambulation  Heart Rate: WNL    COGNITION: Decreased Recall and Inattention    ADL:   No ADL's completed this session. BALANCE:  Sitting Balance:  Modified Independent. Standing Balance: Modified Independent. x1 minute standing at RW talking with therapist.    BED MOBILITY:  Not Tested    TRANSFERS:  Sit to Stand:  Modified Independent. Stand to Sit: Modified Independent. FUNCTIONAL MOBILITY:  Assistive Device: Rolling Walker   Assist Level:  Modified Independent.    Distance: Completed functional mobility within pt room at slow pace, no LOB noted. Pt requires no cues for walker safety- seated rest break after trial of mobility, min fatigue noted. ADDITIONAL ACTIVITIES:  Pt requested ambulation in room to write thank you message on care board. Pt using RW to reach into various planes to higher levels while completing task. Pt completed with Zurdo  for balance and min cues for safe technique or walker safety while reaching into cupboards. Completed to increase kitchen safety and facilitate functional reaching required for simple meal prep tasks. ASSESSMENT:    Pt has made excellent progress towards goals meeting all STGs/LTGs. Pt has exhibited improvement in ADL/IADL performance with Zurdo while implementing EC strategies. Pt to be discharged home with Home Health OT and assist from  as needed. Activity Tolerance:  Patient tolerance of  treatment: good. Discharge Recommendations: Home with Home Health OT  Equipment Recommendations: Equipment Needed: No  Mobility Devices: Walker  Other: Patient has a shower chair with grab bars, a ADA height toilet and a reacher. Family is obtaining a soft sock-aid, shoe horn, additional grab bars near shower and toilet, and walker tray. No other DME needed.   Plan: Home with   Current Treatment Recommendations: Strengthening,Balance Training,Functional Mobility Training,Endurance Training,Home Management Training,Patient/Caregiver Education & Training,Self-Care / ADL,Safety Education & Training    Patient Education  Patient Education: Reviewed Prior Education    Goals  Short term goals  Time Frame for Short term goals: 2-3 days  Short term goal 1: GOAL MET, DISCONTINUE - SEE LTG 3  Short term goal 2: Pt to complete LB ADL tasks with use LHAE as needed with supervisio n GOAL MET  Short term goal 3: Pt will complete demo competency with written HEP for BUE strengthening exercises with min cue'ing to increase strength needed for ADL tasks  GOAL MET  Short term goal 4: Pt to complete sit to stand transfers with supervision and no no cues for safety to increase indep with completing toilet transfers GOAL MET  Short term goal 5: Pt will ambulate to and from the bathroom with S using RW to improve access to the bathroom. GOAL MET  Long term goals  Time Frame for Long term goals : 4 days  Long term goal 1: Pt to complete BADL routine with Supervision and no cues for safety GOAL MET  Long term goal 2: Pt to complete sit to stand transfers from various surfaces including toilet with Supervision and no ceus for safety GOAL MET  Long term goal 3: Pt to demo standing balance with unilateral UE hand release with Supervision to complete clothing management after dressing and toielting GOAL MET    Following session, patient left in safe position with all fall risk precautions in place.

## 2022-01-28 NOTE — PLAN OF CARE
Problem: DISCHARGE BARRIERS  Goal: Patient's continuum of care needs are met  Note: Team conference held Wednesday, 01/26/2022. Recommendations of the team were explained to the patient and spouse, Deepti Lopez, by Marylene Hartigan, LSW, and Dr. Herve Joseph. Team is recommending that patient continue on acute inpatient rehab for two more days, with expected discharge date of Friday, 01/28/2022. Patient's spouse, Deepti Factor, has observed therapy sessions in preparation for discharge home. Following discharge, team is recommending home health care services for RN/PT/OT. Patient indicates preference for [address] [place] [address]. Care plan reviewed with patient and spouse, Deepti Lopez. Patient and spouse, Umerta Factor, verbalized understanding of the plan of care and contributed to goal setting. SW to follow and maintain involvement in discharge planning.

## 2022-01-28 NOTE — PLAN OF CARE
Problem: Falls - Risk of:  Goal: Will remain free from falls  Description: Will remain free from falls  1/28/2022 0926 by Urban Diamond LPN  Outcome: Completed  1/27/2022 2347 by Avtar Arzate RN  Outcome: Met This Shift  Goal: Absence of physical injury  Description: Absence of physical injury  1/28/2022 0926 by Urban Diamond LPN  Outcome: Completed  1/27/2022 2347 by Avtar Arzate RN  Outcome: Met This Shift     Problem: Skin Integrity:  Goal: Will show no infection signs and symptoms  Description: Will show no infection signs and symptoms  1/28/2022 0926 by Urban Diamond LPN  Outcome: Completed  1/27/2022 2347 by Avtar Arzate RN  Outcome: Met This Shift  Goal: Absence of new skin breakdown  Description: Absence of new skin breakdown  1/28/2022 0926 by Urban Diamond LPN  Outcome: Completed  1/27/2022 2347 by Avtar Arzate RN  Outcome: Met This Shift     Problem: Mobility - Impaired:  Goal: Mobility will improve  Description: Mobility will improve  1/28/2022 0926 by Urban Diamond LPN  Outcome: Completed  1/27/2022 2347 by Avtar Arzate RN  Outcome: Met This Shift     Problem: Bleeding:  Goal: Will show no signs and symptoms of excessive bleeding  Description: Will show no signs and symptoms of excessive bleeding  1/28/2022 0926 by Urban Diamond LPN  Outcome: Completed  1/27/2022 2347 by Avtar Arzate RN  Outcome: Met This Shift     Problem: Pain:  Goal: Pain level will decrease  Description: Pain level will decrease  1/28/2022 0926 by Urban Diamond LPN  Outcome: Completed  1/27/2022 2347 by Avtar Arzate RN  Outcome: Met This Shift  Goal: Control of acute pain  Description: Control of acute pain  1/28/2022 0926 by Urban Diamond LPN  Outcome: Completed  1/27/2022 2347 by Avtar Arzate RN  Outcome: Met This Shift  Goal: Control of chronic pain  Description: Control of chronic pain  1/28/2022 0926 by Urban Diamond LPN  Outcome: Completed  1/27/2022 2347 by Avtar Arzate RN  Outcome: Met This Shift     Problem: Breathing Pattern - Ineffective:  Goal: Ability to achieve and maintain a regular respiratory rate will improve  Description: Ability to achieve and maintain a regular respiratory rate will improve  1/28/2022 0926 by Adma Belcher LPN  Outcome: Completed  1/27/2022 2347 by Celena Walker RN  Outcome: Met This Shift     Problem: Serum Glucose Level - Abnormal:  Goal: Ability to maintain appropriate glucose levels has stabilized  Description: Ability to maintain appropriate glucose levels has stabilized  1/28/2022 0926 by Adam Belcher LPN  Outcome: Completed  1/27/2022 2347 by Celena Walker RN  Outcome: Met This Shift     Problem: IP DRESSINGS LOWER EXTREMITIES  Goal: LTG - patient will dress lower body with or without assistive device  1/28/2022 0926 by Adam Belcher LPN  Outcome: Completed  1/27/2022 2347 by Celena Walker RN  Outcome: Met This Shift     Problem: IP TOILETING  Goal: LTG - Patient will utilize adaptive techniques/equipment to complete daily toileting tasks  1/28/2022 0926 by Adam Belcher LPN  Outcome: Completed  1/27/2022 2347 by Celena Walker RN  Outcome: Met This Shift     Problem: IP TRANSFERS  Goal: LTG - patient will transfer from one surface to another  1/28/2022 0926 by Adam Belcher LPN  Outcome: Completed  1/27/2022 2347 by Celena Walker RN  Outcome: Met This Shift     Problem: IP COMMUNICATION/DYSARTHRIA  Goal: LTG - Patient will effectively communicate in all situations with the use of compensatory strategies  1/28/2022 0926 by Adam Belcher LPN  Outcome: Completed  1/27/2022 2347 by Celena Walker RN  Outcome: Met This Shift     Problem: IP SWALLOWING  Goal: LTG - patient will tolerate the least restrictive diet consistency to allow for safe consumption of daily meals  1/28/2022 0926 by Adam Belcher LPN  Outcome: Completed  1/27/2022 2347 by Celena Walker RN  Outcome: Met This Shift     Problem: IP MOBILITY  Goal: LTG - patient will demonstrate safe mobility requirements  1/28/2022 0926 by Luis Fernando Barrera LPN  Outcome: Completed  1/27/2022 2347 by Elizabeth Moon RN  Outcome: Met This Shift     Problem: DISCHARGE BARRIERS  Goal: Patient's continuum of care needs are met  1/28/2022 0926 by Luis Fernando Barrera LPN  Outcome: Completed  1/27/2022 2347 by Elizabeth Moon RN  Outcome: Met This Shift  1/27/2022 2044 by KIARRA Schmitt  Note: Team conference held Wednesday, 01/26/2022. Recommendations of the team were explained to the patient and spouse, Homer Reveles, by KIARRA Christianson, and Dr. Viraj Bruno. Team is recommending that patient continue on acute inpatient rehab for two more days, with expected discharge date of Friday, 01/28/2022. Patient's spouse, Homer Reveles, has observed therapy sessions in preparation for discharge home. Following discharge, team is recommending home health care services for RN/PT/OT. Patient indicates preference for [address] [place] [address]. Care plan reviewed with patient and spouse, Homer Abdirizak. Patient and spouse, Homer Reveles, verbalized understanding of the plan of care and contributed to goal setting. SW to follow and maintain involvement in discharge planning.

## 2022-01-28 NOTE — PLAN OF CARE
Plan of care reviewed with Nir George and her family. Nir George is doing well and transferring from bed to chair or bathroom with walker and gait belt, steady gait. No complaints noted this shift. Call light in reach.

## 2022-01-28 NOTE — PROGRESS NOTES
SERVANDO contacted [address] [place] [address] with referral for RN/PT/OT. Referral information and discharge date of today, 01/28/2022, provided to [facility]. Agency has access to referral information, discharge instructions, and face to face encounter. Patient additionally qualified for home oxygen. Patient indicates prior to admission was on home oxygen supplied through [facility]. However, due to hospitalization spouse, Kelley Rosales, contacted company to  equipment. [facility] has not yet picked up equipment. [facility] contacted [facility] [place] need [place] and new home oxygen evaluation completed. Referral information faxed accordingly.

## 2022-03-09 ENCOUNTER — HOSPITAL ENCOUNTER (EMERGENCY)
Age: 72
Discharge: HOME | End: 2022-03-09
Payer: MEDICARE

## 2022-03-09 VITALS
SYSTOLIC BLOOD PRESSURE: 171 MMHG | DIASTOLIC BLOOD PRESSURE: 74 MMHG | HEART RATE: 112 BPM | TEMPERATURE: 97.6 F | BODY MASS INDEX: 35.85 KG/M2 | WEIGHT: 196 LBS | OXYGEN SATURATION: 97 % | RESPIRATION RATE: 22 BRPM

## 2022-03-09 DIAGNOSIS — R06.02 SHORTNESS OF BREATH: Primary | ICD-10-CM

## 2022-03-09 DIAGNOSIS — Z86.16 HISTORY OF COVID-19: ICD-10-CM

## 2022-03-09 PROCEDURE — 99213 OFFICE O/P EST LOW 20 MIN: CPT

## 2022-03-09 PROCEDURE — 99213 OFFICE O/P EST LOW 20 MIN: CPT | Performed by: NURSE PRACTITIONER

## 2022-03-09 ASSESSMENT — ENCOUNTER SYMPTOMS
COUGH: 1
CHEST TIGHTNESS: 0
EYE REDNESS: 0
SHORTNESS OF BREATH: 1
NAUSEA: 0
RHINORRHEA: 0
SORE THROAT: 0
DIARRHEA: 0
TROUBLE SWALLOWING: 0
EYE DISCHARGE: 0
VOMITING: 0
WHEEZING: 1

## 2022-03-09 NOTE — ED TRIAGE NOTES
Patient ambulated to room with c/o increased productive cough and shortness of breath beginning three days ago. No distress noted at this time. Denies chest pain. Patient talkative.

## 2022-03-10 NOTE — ED PROVIDER NOTES
[address]       Chief Complaint   Patient presents with    Cough     shorntess of breath       Nurses Notes reviewed and I agree except as noted in the HPI. HISTORY OF PRESENT ILLNESS   Mechelle Nye is a 73 y.o. female who presents for evaluation of cough and shortness of breath. Onset 3 days ago, unchanged. Cough is intermittent, productive at times. Associated shortness of breath on exertion. She also complains of shortness of breath and coughing spells. No chest pain. No leg swelling. No chest pressure. History of Covid 19. Patient was intubated for 14 days. Patient was prescribed as needed oxygen, has not been using as much as she should. She notes improvement with oxygen administration. REVIEW OF SYSTEMS     Review of Systems   Constitutional: Positive for fatigue. Negative for chills, diaphoresis and fever. HENT: Negative for congestion, ear pain, rhinorrhea, sore throat and trouble swallowing. Eyes: Negative for discharge and redness. Respiratory: Positive for cough, shortness of breath and wheezing. Negative for chest tightness. Cardiovascular: Negative for chest pain, palpitations and leg swelling. Gastrointestinal: Negative for diarrhea, nausea and vomiting. Genitourinary: Negative for decreased urine volume. Musculoskeletal: Negative for neck pain and neck stiffness. Skin: Negative for rash. Neurological: Negative for headaches. Hematological: Negative for adenopathy. Psychiatric/Behavioral: Negative for sleep disturbance. PAST MEDICAL HISTORY         Diagnosis Date    Coronary artery disease     Primary hypertension     Type 2 diabetes mellitus ([facility] [address]        SURGICAL HISTORY     Patient  has a past surgical history that includes Coronary angioplasty with stent (); Cataract removal (Bilateral, );  section;  Hysterectomy, total abdominal; hiatal hernia repair; and sinus surgery. CURRENT MEDICATIONS       Discharge Medication List as of 3/9/2022  6:19 PM      CONTINUE these medications which have NOT CHANGED    Details   hydrOXYzine (VISTARIL) 25 MG capsule Take 1 capsule by mouth 4 times daily as needed for Anxiety, Disp-60 capsule, R-1Normal      albuterol sulfate  (90 Base) MCG/ACT inhaler Inhale 2 puffs into the lungs every 6 hours as needed for Wheezing, Disp-18 g, R-3Normal      glipiZIDE (GLUCOTROL) 5 MG tablet Take 1 tablet by mouth 2 times daily (with meals), Disp-60 tablet, R-3Normal      miconazole (MICOTIN) 2 % powder Apply topically 2 times daily. , Disp-45 g, R-1, Normal      diclofenac sodium (VOLTAREN) 1 % GEL Apply 2 g topically 4 times daily as needed for Pain, Topical, 4 TIMES DAILY PRN Starting Thu 1/27/2022, Disp-150 g, R-3, Normal      furosemide (LASIX) 40 MG tablet Take 1 tablet by mouth daily, Disp-60 tablet, R-3Normal      docusate sodium (COLACE) 100 MG capsule Take 1 capsule by mouth 2 times daily, Disp-60 capsule, R-3Normal      polycarbophil (FIBERCON) 625 MG tablet Take 1 tablet by mouth daily, R-4OTC      senna (SENOKOT) 8.6 MG tablet Take 1 tablet by mouth 2 times daily as needed (Constipation), Disp-60 tablet, R-1Normal      melatonin 3 MG TABS tablet Take 2 tablets by mouth nightly, Disp-60 tablet, R-3Normal      famotidine (PEPCID) 20 MG tablet Take 1 tablet by mouth 2 times daily, Disp-60 tablet, R-3Normal      OXYGEN Inhale 2 L/min into the lungs continuous prn (during activities such as walking), Disp-1 Canister, R-5Print      rosuvastatin (CRESTOR) 10 MG tablet Take 10 mg by mouth dailyHistorical Med      nitroGLYCERIN (NITROSTAT) 0.4 MG SL tablet Place 0.4 mg under the tongue every 5 minutes as needed for Chest pain up to max of 3 total doses. If no relief after 1 dose, call 911. Historical Med      aspirin 81 MG chewable tablet Take 81 mg by mouth dailyHistorical Med             ALLERGIES     Patient is is allergic to metformin and related, sulfa antibiotics, and codeine. FAMILY HISTORY     Patient'sfamily history includes Lung Cancer in her father; Parkinson's Disease in her father. SOCIAL HISTORY     Patient  reports that she has never smoked. She has never used smokeless tobacco. She reports previous alcohol use. She reports that she does not use drugs. PHYSICAL EXAM     ED TRIAGE VITALS  BP: (!) 171/74, Temp: 97.6 °F (36.4 °C), Pulse: 112, Resp: 22, SpO2: 97 %  Physical Exam  Vitals and nursing note reviewed. Constitutional:       General: She is not in acute distress. Appearance: Normal appearance. She is well-developed. She is not ill-appearing, toxic-appearing or diaphoretic. HENT:      Head: Normocephalic and atraumatic. Right Ear: Hearing, tympanic membrane, ear canal and external ear normal. No mastoid tenderness. No hemotympanum. Tympanic membrane is not perforated, erythematous or bulging. Left Ear: Hearing, tympanic membrane, ear canal and external ear normal. No mastoid tenderness. No hemotympanum. Tympanic membrane is not perforated, erythematous or bulging. Nose: Nose normal.      Mouth/Throat:      Mouth: Mucous membranes are moist.      Pharynx: Oropharynx is clear. Uvula midline. Tonsils: No tonsillar abscesses. Eyes:      General: No scleral icterus. Conjunctiva/sclera: Conjunctivae normal.      Right eye: Right conjunctiva is not injected. No hemorrhage. Left eye: Left conjunctiva is not injected. No hemorrhage. Neck:      Thyroid: No thyromegaly. Trachea: Trachea normal.   Cardiovascular:      Rate and Rhythm: Regular rhythm. Tachycardia present. No extrasystoles are present. Chest Wall: PMI is not displaced. Heart sounds: Normal heart sounds. No murmur heard. No friction rub. No gallop. Pulmonary:      Effort: Pulmonary effort is normal. No respiratory distress. Breath sounds: Normal breath sounds. No wheezing or rhonchi.    Chest:   Breasts: [facility] [address]  [phone]    Mucinex OTC. Use oxygen as needed. Deep breaths every hour. If any distress go to ER.     DISCHARGE MEDICATIONS:  Discharge Medication List as of 3/9/2022  6:19 PM        Discharge Medication List as of 3/9/2022  6:19 PM          WYATT Matos CNP, APRN - CNP  03/09/22 1935

## 2022-03-18 ENCOUNTER — APPOINTMENT (OUTPATIENT)
Dept: CT IMAGING | Age: 72
DRG: 166 | End: 2022-03-18
Payer: MEDICARE

## 2022-03-18 ENCOUNTER — HOSPITAL ENCOUNTER (INPATIENT)
Age: 72
LOS: 5 days | Discharge: HOME | DRG: 166 | End: 2022-03-23
Attending: FAMILY MEDICINE | Admitting: HOSPITALIST
Payer: MEDICARE

## 2022-03-18 ENCOUNTER — APPOINTMENT (OUTPATIENT)
Dept: GENERAL RADIOLOGY | Age: 72
DRG: 166 | End: 2022-03-18
Payer: MEDICARE

## 2022-03-18 DIAGNOSIS — R00.0 TACHYCARDIA: ICD-10-CM

## 2022-03-18 DIAGNOSIS — E87.5 HYPERKALEMIA: ICD-10-CM

## 2022-03-18 DIAGNOSIS — R06.00 DYSPNEA AND RESPIRATORY ABNORMALITIES: ICD-10-CM

## 2022-03-18 DIAGNOSIS — R06.89 DYSPNEA AND RESPIRATORY ABNORMALITIES: ICD-10-CM

## 2022-03-18 DIAGNOSIS — R06.1 INSPIRATORY STRIDOR: Primary | ICD-10-CM

## 2022-03-18 LAB
ALBUMIN SERPL-MCNC: 4.3 G/DL (ref 3.5–5.1)
ALP BLD-CCNC: 101 U/L (ref 38–126)
ALT SERPL-CCNC: 9 U/L (ref 11–66)
ANION GAP SERPL CALCULATED.3IONS-SCNC: 11 MEQ/L (ref 8–16)
AST SERPL-CCNC: 16 U/L (ref 5–40)
BASOPHILS # BLD: 0.1 %
BASOPHILS ABSOLUTE: 0 THOU/MM3 (ref 0–0.1)
BILIRUB SERPL-MCNC: 0.2 MG/DL (ref 0.3–1.2)
BILIRUBIN DIRECT: < 0.2 MG/DL (ref 0–0.3)
BUN BLDV-MCNC: 10 MG/DL (ref 7–22)
CALCIUM SERPL-MCNC: 9.6 MG/DL (ref 8.5–10.5)
CHLORIDE BLD-SCNC: 97 MEQ/L (ref 98–111)
CO2: 31 MEQ/L (ref 23–33)
CREAT SERPL-MCNC: 0.5 MG/DL (ref 0.4–1.2)
EKG ATRIAL RATE: 115 BPM
EKG P AXIS: 47 DEGREES
EKG P-R INTERVAL: 156 MS
EKG Q-T INTERVAL: 346 MS
EKG QRS DURATION: 82 MS
EKG QTC CALCULATION (BAZETT): 478 MS
EKG R AXIS: 3 DEGREES
EKG T AXIS: 62 DEGREES
EKG VENTRICULAR RATE: 115 BPM
EOSINOPHIL # BLD: 0.5 %
EOSINOPHILS ABSOLUTE: 0.1 THOU/MM3 (ref 0–0.4)
ERYTHROCYTE [DISTWIDTH] IN BLOOD BY AUTOMATED COUNT: 12.9 % (ref 11.5–14.5)
ERYTHROCYTE [DISTWIDTH] IN BLOOD BY AUTOMATED COUNT: 43.7 FL (ref 35–45)
GFR SERPL CREATININE-BSD FRML MDRD: > 90 ML/MIN/1.73M2
GLUCOSE BLD-MCNC: 192 MG/DL (ref 70–108)
HCT VFR BLD CALC: 42.4 % (ref 37–47)
HEMOGLOBIN: 13.5 GM/DL (ref 12–16)
IMMATURE GRANS (ABS): 0.09 THOU/MM3 (ref 0–0.07)
IMMATURE GRANULOCYTES: 0.6 %
LACTIC ACID, SEPSIS: 0.7 MMOL/L (ref 0.5–1.9)
LYMPHOCYTES # BLD: 8.2 %
LYMPHOCYTES ABSOLUTE: 1.2 THOU/MM3 (ref 1–4.8)
MCH RBC QN AUTO: 29.4 PG (ref 26–33)
MCHC RBC AUTO-ENTMCNC: 31.8 GM/DL (ref 32.2–35.5)
MCV RBC AUTO: 92.4 FL (ref 81–99)
MONOCYTES # BLD: 4.4 %
MONOCYTES ABSOLUTE: 0.6 THOU/MM3 (ref 0.4–1.3)
NUCLEATED RED BLOOD CELLS: 0 /100 WBC
OSMOLALITY CALCULATION: 281.8 MOSMOL/KG (ref 275–300)
PLATELET # BLD: 245 THOU/MM3 (ref 130–400)
PMV BLD AUTO: 9.9 FL (ref 9.4–12.4)
POTASSIUM SERPL-SCNC: 4.3 MEQ/L (ref 3.5–5.2)
PRO-BNP: 35.2 PG/ML (ref 0–900)
RBC # BLD: 4.59 MILL/MM3 (ref 4.2–5.4)
SEG NEUTROPHILS: 86.2 %
SEGMENTED NEUTROPHILS ABSOLUTE COUNT: 12.6 THOU/MM3 (ref 1.8–7.7)
SODIUM BLD-SCNC: 139 MEQ/L (ref 135–145)
TOTAL PROTEIN: 7.6 G/DL (ref 6.1–8)
TROPONIN T: < 0.01 NG/ML
WBC # BLD: 14.6 THOU/MM3 (ref 4.8–10.8)

## 2022-03-18 PROCEDURE — 6360000002 HC RX W HCPCS: Performed by: FAMILY MEDICINE

## 2022-03-18 PROCEDURE — 83605 ASSAY OF LACTIC ACID: CPT

## 2022-03-18 PROCEDURE — 82248 BILIRUBIN DIRECT: CPT

## 2022-03-18 PROCEDURE — 2700000000 HC OXYGEN THERAPY PER DAY

## 2022-03-18 PROCEDURE — 84484 ASSAY OF TROPONIN QUANT: CPT

## 2022-03-18 PROCEDURE — 87641 MR-STAPH DNA AMP PROBE: CPT

## 2022-03-18 PROCEDURE — 99285 EMERGENCY DEPT VISIT HI MDM: CPT

## 2022-03-18 PROCEDURE — 80053 COMPREHEN METABOLIC PANEL: CPT

## 2022-03-18 PROCEDURE — 71045 X-RAY EXAM CHEST 1 VIEW: CPT

## 2022-03-18 PROCEDURE — 36415 COLL VENOUS BLD VENIPUNCTURE: CPT

## 2022-03-18 PROCEDURE — 83880 ASSAY OF NATRIURETIC PEPTIDE: CPT

## 2022-03-18 PROCEDURE — 94761 N-INVAS EAR/PLS OXIMETRY MLT: CPT

## 2022-03-18 PROCEDURE — 85025 COMPLETE CBC W/AUTO DIFF WBC: CPT

## 2022-03-18 PROCEDURE — 94640 AIRWAY INHALATION TREATMENT: CPT

## 2022-03-18 PROCEDURE — 71275 CT ANGIOGRAPHY CHEST: CPT

## 2022-03-18 PROCEDURE — 2060000000 HC ICU INTERMEDIATE R&B

## 2022-03-18 PROCEDURE — 6370000000 HC RX 637 (ALT 250 FOR IP): Performed by: FAMILY MEDICINE

## 2022-03-18 PROCEDURE — 93010 ELECTROCARDIOGRAM REPORT: CPT | Performed by: INTERNAL MEDICINE

## 2022-03-18 PROCEDURE — 93005 ELECTROCARDIOGRAM TRACING: CPT | Performed by: FAMILY MEDICINE

## 2022-03-18 PROCEDURE — 6360000004 HC RX CONTRAST MEDICATION: Performed by: FAMILY MEDICINE

## 2022-03-18 PROCEDURE — 96374 THER/PROPH/DIAG INJ IV PUSH: CPT

## 2022-03-18 RX ORDER — ALBUTEROL SULFATE 90 UG/1
2 AEROSOL, METERED RESPIRATORY (INHALATION) EVERY 6 HOURS PRN
Status: DISCONTINUED | OUTPATIENT
Start: 2022-03-18 | End: 2022-03-23 | Stop reason: HOSPADM

## 2022-03-18 RX ORDER — FAMOTIDINE 20 MG/1
20 TABLET, FILM COATED ORAL 2 TIMES DAILY
Status: DISCONTINUED | OUTPATIENT
Start: 2022-03-18 | End: 2022-03-23 | Stop reason: HOSPADM

## 2022-03-18 RX ORDER — ACETAMINOPHEN 325 MG/1
650 TABLET ORAL EVERY 6 HOURS PRN
Status: DISCONTINUED | OUTPATIENT
Start: 2022-03-18 | End: 2022-03-23 | Stop reason: HOSPADM

## 2022-03-18 RX ORDER — MAGNESIUM SULFATE IN WATER 40 MG/ML
2000 INJECTION, SOLUTION INTRAVENOUS PRN
Status: DISCONTINUED | OUTPATIENT
Start: 2022-03-18 | End: 2022-03-23 | Stop reason: HOSPADM

## 2022-03-18 RX ORDER — SODIUM CHLORIDE 9 MG/ML
25 INJECTION, SOLUTION INTRAVENOUS PRN
Status: DISCONTINUED | OUTPATIENT
Start: 2022-03-18 | End: 2022-03-23 | Stop reason: HOSPADM

## 2022-03-18 RX ORDER — METHYLPREDNISOLONE SODIUM SUCCINATE 125 MG/2ML
125 INJECTION, POWDER, LYOPHILIZED, FOR SOLUTION INTRAMUSCULAR; INTRAVENOUS ONCE
Status: COMPLETED | OUTPATIENT
Start: 2022-03-18 | End: 2022-03-18

## 2022-03-18 RX ORDER — SODIUM CHLORIDE 0.9 % (FLUSH) 0.9 %
5-40 SYRINGE (ML) INJECTION EVERY 12 HOURS SCHEDULED
Status: DISCONTINUED | OUTPATIENT
Start: 2022-03-18 | End: 2022-03-23 | Stop reason: HOSPADM

## 2022-03-18 RX ORDER — POTASSIUM CHLORIDE 20 MEQ/1
40 TABLET, EXTENDED RELEASE ORAL PRN
Status: DISCONTINUED | OUTPATIENT
Start: 2022-03-18 | End: 2022-03-23 | Stop reason: HOSPADM

## 2022-03-18 RX ORDER — DEXTROSE MONOHYDRATE 25 G/50ML
12.5 INJECTION, SOLUTION INTRAVENOUS PRN
Status: DISCONTINUED | OUTPATIENT
Start: 2022-03-18 | End: 2022-03-18 | Stop reason: CLARIF

## 2022-03-18 RX ORDER — POTASSIUM CHLORIDE 7.45 MG/ML
10 INJECTION INTRAVENOUS PRN
Status: DISCONTINUED | OUTPATIENT
Start: 2022-03-18 | End: 2022-03-23 | Stop reason: HOSPADM

## 2022-03-18 RX ORDER — FUROSEMIDE 40 MG/1
40 TABLET ORAL DAILY
Status: DISCONTINUED | OUTPATIENT
Start: 2022-03-19 | End: 2022-03-23 | Stop reason: HOSPADM

## 2022-03-18 RX ORDER — SODIUM CHLORIDE 0.9 % (FLUSH) 0.9 %
5-40 SYRINGE (ML) INJECTION PRN
Status: DISCONTINUED | OUTPATIENT
Start: 2022-03-18 | End: 2022-03-23 | Stop reason: HOSPADM

## 2022-03-18 RX ORDER — ALBUTEROL SULFATE 2.5 MG/3ML
2.5 SOLUTION RESPIRATORY (INHALATION) EVERY 6 HOURS PRN
Status: DISCONTINUED | OUTPATIENT
Start: 2022-03-18 | End: 2022-03-23 | Stop reason: HOSPADM

## 2022-03-18 RX ORDER — ONDANSETRON 2 MG/ML
4 INJECTION INTRAMUSCULAR; INTRAVENOUS EVERY 6 HOURS PRN
Status: DISCONTINUED | OUTPATIENT
Start: 2022-03-18 | End: 2022-03-23 | Stop reason: HOSPADM

## 2022-03-18 RX ORDER — METHYLPREDNISOLONE SODIUM SUCCINATE 40 MG/ML
40 INJECTION, POWDER, LYOPHILIZED, FOR SOLUTION INTRAMUSCULAR; INTRAVENOUS DAILY
Status: DISCONTINUED | OUTPATIENT
Start: 2022-03-19 | End: 2022-03-23 | Stop reason: HOSPADM

## 2022-03-18 RX ORDER — ASPIRIN 81 MG/1
81 TABLET, CHEWABLE ORAL DAILY
Status: DISCONTINUED | OUTPATIENT
Start: 2022-03-19 | End: 2022-03-23 | Stop reason: HOSPADM

## 2022-03-18 RX ORDER — ONDANSETRON 4 MG/1
4 TABLET, ORALLY DISINTEGRATING ORAL EVERY 8 HOURS PRN
Status: DISCONTINUED | OUTPATIENT
Start: 2022-03-18 | End: 2022-03-23 | Stop reason: HOSPADM

## 2022-03-18 RX ORDER — SODIUM CHLORIDE FOR INHALATION 0.9 %
3 VIAL, NEBULIZER (ML) INHALATION EVERY 4 HOURS PRN
Status: DISCONTINUED | OUTPATIENT
Start: 2022-03-18 | End: 2022-03-18 | Stop reason: SDUPTHER

## 2022-03-18 RX ORDER — NICOTINE POLACRILEX 4 MG
15 LOZENGE BUCCAL PRN
Status: DISCONTINUED | OUTPATIENT
Start: 2022-03-18 | End: 2022-03-18 | Stop reason: CLARIF

## 2022-03-18 RX ORDER — DEXTROSE MONOHYDRATE 50 MG/ML
100 INJECTION, SOLUTION INTRAVENOUS PRN
Status: DISCONTINUED | OUTPATIENT
Start: 2022-03-18 | End: 2022-03-23 | Stop reason: HOSPADM

## 2022-03-18 RX ORDER — ACETAMINOPHEN 650 MG/1
650 SUPPOSITORY RECTAL EVERY 6 HOURS PRN
Status: DISCONTINUED | OUTPATIENT
Start: 2022-03-18 | End: 2022-03-23 | Stop reason: HOSPADM

## 2022-03-18 RX ORDER — POLYETHYLENE GLYCOL 3350 17 G/17G
17 POWDER, FOR SOLUTION ORAL DAILY PRN
Status: DISCONTINUED | OUTPATIENT
Start: 2022-03-18 | End: 2022-03-23 | Stop reason: HOSPADM

## 2022-03-18 RX ORDER — SODIUM CHLORIDE FOR INHALATION 0.9 %
3 VIAL, NEBULIZER (ML) INHALATION EVERY 4 HOURS PRN
Status: DISCONTINUED | OUTPATIENT
Start: 2022-03-18 | End: 2022-03-23 | Stop reason: HOSPADM

## 2022-03-18 RX ORDER — HYDROXYZINE PAMOATE 25 MG/1
25 CAPSULE ORAL 4 TIMES DAILY PRN
Status: DISCONTINUED | OUTPATIENT
Start: 2022-03-18 | End: 2022-03-23 | Stop reason: HOSPADM

## 2022-03-18 RX ORDER — ROSUVASTATIN CALCIUM 10 MG/1
10 TABLET, COATED ORAL DAILY
Status: DISCONTINUED | OUTPATIENT
Start: 2022-03-19 | End: 2022-03-23 | Stop reason: HOSPADM

## 2022-03-18 RX ORDER — SENNA PLUS 8.6 MG/1
1 TABLET ORAL 2 TIMES DAILY PRN
Status: DISCONTINUED | OUTPATIENT
Start: 2022-03-18 | End: 2022-03-23 | Stop reason: HOSPADM

## 2022-03-18 RX ORDER — LANOLIN ALCOHOL/MO/W.PET/CERES
6 CREAM (GRAM) TOPICAL NIGHTLY
Status: DISCONTINUED | OUTPATIENT
Start: 2022-03-18 | End: 2022-03-23 | Stop reason: HOSPADM

## 2022-03-18 RX ORDER — DOCUSATE SODIUM 100 MG/1
100 CAPSULE, LIQUID FILLED ORAL 2 TIMES DAILY
Status: DISCONTINUED | OUTPATIENT
Start: 2022-03-18 | End: 2022-03-23 | Stop reason: HOSPADM

## 2022-03-18 RX ADMIN — ISODIUM CHLORIDE 3 ML: 0.03 SOLUTION RESPIRATORY (INHALATION) at 21:25

## 2022-03-18 RX ADMIN — RACEPINEPHRINE HYDROCHLORIDE 11.25 MG: 11.25 SOLUTION RESPIRATORY (INHALATION) at 21:25

## 2022-03-18 RX ADMIN — METHYLPREDNISOLONE SODIUM SUCCINATE 125 MG: 125 INJECTION, POWDER, FOR SOLUTION INTRAMUSCULAR; INTRAVENOUS at 20:04

## 2022-03-18 RX ADMIN — IOPAMIDOL 80 ML: 755 INJECTION, SOLUTION INTRAVENOUS at 20:44

## 2022-03-18 ASSESSMENT — PAIN DESCRIPTION - DESCRIPTORS: DESCRIPTORS: TIGHTNESS

## 2022-03-18 ASSESSMENT — PAIN DESCRIPTION - PAIN TYPE: TYPE: ACUTE PAIN

## 2022-03-18 ASSESSMENT — PAIN SCALES - GENERAL: PAINLEVEL_OUTOF10: 5

## 2022-03-18 ASSESSMENT — PAIN DESCRIPTION - LOCATION: LOCATION: CHEST

## 2022-03-18 ASSESSMENT — PAIN DESCRIPTION - FREQUENCY: FREQUENCY: INTERMITTENT

## 2022-03-18 ASSESSMENT — PAIN - FUNCTIONAL ASSESSMENT: PAIN_FUNCTIONAL_ASSESSMENT: 0-10

## 2022-03-18 NOTE — ED NOTES
Pt sitting in bed, labored breathing, pt loudly wheezing with breathing. Pt reports she hasn't been feeling well for the past week, reports it got very bad since yesterday. Pt coughing, unable to bring anything up. Pt reports her saturation dropped in 70% at home walking 15 feet without oxygen, saturation went back up quickly at rest , on 3LNC.      Merary Zheng, YVETTE  03/18/22 Julius Meyer RN  03/18/22 1931

## 2022-03-18 NOTE — ED PROVIDER NOTES
[address] COMPLAINT   Chief Complaint   Patient presents with    Shortness of Breath        HPI   Clearnce Edilia is a 73 y.o. 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 Since her discharge home, she's always has some degree of stridor, but that has gotten worse over the past few days, and who presents with shortness of breath and difficulty breathing, onset was last few days. The duration has been constant. She normally uses 2L of oxygen via NC, but upon arrival required 3L. REVIEW OF SYSTEMS   Cardiac: +dyspnea on exertion; neg Chest Pain, Denies syncope   Respiratory: +sob and dyspnea; Denies cough or hemoptysis   ENT: +stridor  GI: Denies Vomiting or Diarrhea   General: Denies Fever   All other review of systems are reviewed and are otherwise negative.      PAST MEDICAL & SURGICAL HISTORY   Past Medical History:   Diagnosis Date    Coronary artery disease     COVID     Primary hypertension     Type 2 diabetes mellitus ([facility] [address] 2018      Past Surgical History:   Procedure Laterality Date    CARDIAC SURGERY      CATARACT REMOVAL Bilateral      SECTION      3 times    CORONARY ANGIOPLASTY WITH STENT PLACEMENT      stenting twice    EYE SURGERY      HIATAL HERNIA REPAIR      late     HYSTERECTOMY, TOTAL ABDOMINAL      in [address]      in         CURRENT MEDICATIONS   REM       ALLERGIES   Allergies   Allergen Reactions    Metformin And Related Other (See Comments)     diarrhea    Codeine Nausea And Vomiting    Meperidine Hcl Nausea And Vomiting    Sulfa Antibiotics Nausea And Vomiting    Sumatriptan Nausea And Vomiting        SOCIAL & FAMILY HISTORY   Social History     Socioeconomic History    Marital status:      Spouse name: bailee     Number of children: 3    Years of education: None    Highest education level: None Occupational History    None   Tobacco Use    Smoking status: Never Smoker    Smokeless tobacco: Never Used   Vaping Use    Vaping Use: Never used    Passive vaping exposure: Yes   Substance and Sexual Activity    Alcohol use: Not Currently     Comment: drinks 1 glass of wine cooler or wine about 3 times per year    Drug use: Never    Sexual activity: Not Currently   Other Topics Concern    None   Social History Narrative    None     Social Determinants of Health     Financial Resource Strain:     Difficulty of Paying Living Expenses: Not on file   Food Insecurity:     Worried About Running Out of Food in the Last Year: Not on file    Benjamin of Food in the Last Year: Not on file   Transportation Needs:     Lack of Transportation (Medical): Not on file    Lack of Transportation (Non-Medical):  Not on file   Physical Activity:     Days of Exercise per Week: Not on file    Minutes of Exercise per Session: Not on file   Stress:     Feeling of Stress : Not on file   Social Connections:     Frequency of Communication with Friends and Family: Not on file    Frequency of Social Gatherings with Friends and Family: Not on file    Attends Buddhist Services: Not on file    Active Member of [address] [place] [address] or Organizations: Not on file    Attends Club or Organization Meetings: Not on file    Marital Status: Not on file   Intimate Partner Violence:     Fear of Current or Ex-Partner: Not on file    Emotionally Abused: Not on file    Physically Abused: Not on file    Sexually Abused: Not on file   Housing Stability:     Unable to Pay for Housing in the Last Year: Not on file    Number of [place] in the Last Year: Not on file    Unstable Housing in the Last Year: Not on file      Family History   Problem Relation Age of Onset    Parkinson's Disease Father     Lung Cancer Father         PHYSICAL EXAM   VITAL SIGNS: BP (!) 144/69   Pulse 105   Temp 98.5 °F (36.9 °C) (Oral)   Resp 18   Ht 5' 2\" (1.575 m)   Wt 196 lb (88.9 kg)   LMP (LMP Unknown)   SpO2 100%   BMI 35.85 kg/m²    Constitutional: Well developed, well nourished, moderate acute distress   HENT: Atraumatic, moist mucus membranes; obvious stridorous breathing without any tripod position or drooling  Neck: supple, no JVD   Respiratory: Lungs showed decreased bibasilar breath sounds, mild tachypnea, no retractions   Cardiovascular: Tachycardic rate, normal rhythm, no murmurs   Vascular: Radial pulses 2+ equal bilaterally   GI: Soft, nontender, normal bowel sounds   Musculoskeletal: No edema, no deformities   Integument: Skin warm and dry, no petechiae   Neurologic: Alert & oriented, normal speech   Psych: Pleasant affect, no hallucinations     EKG (interpreted by me) 3/18/22  Rhythm: Sinus   Rate: 110 BPM   Axis: normal   Conduction: normal   ST/T Segments: nonacute     RADIOLOGY/PROCEDURES   CTA Chest W WO Contrast   Final Result   Impression:   1. No pulmonary emboli. 2. No thoracic aortic aneurysm or dissection   3. Bilateral peripheral opacities are improved since prior CT of    12/18/2021 and may represent resolving pneumonia versus interstitial lung    disease      This document has been electronically signed by: Katie Arellano MD on    03/18/2022 09:20 PM      All CTs at this facility use dose modulation techniques and iterative    reconstructions, and/or weight-based dosing   when appropriate to reduce radiation to a low as reasonably achievable. XR CHEST PORTABLE   Final Result   Possible mild interstitial infiltrates. Interval improvement in aeration    since the prior study.       This document has been electronically signed by: Angeles Castillo MD on    03/18/2022 07:47 PM           LABS   Labs Reviewed   BASIC METABOLIC PANEL - Abnormal; Notable for the following components:       Result Value    Chloride 97 (*)     Glucose 192 (*)     All other components within normal limits   CBC WITH AUTO DIFFERENTIAL - Abnormal; Notable for the following components:    WBC 14.6 (*)     MCHC 31.8 (*)     Segs Absolute 12.6 (*)     Immature Grans (Abs) 0.09 (*)     All other components within normal limits   HEPATIC FUNCTION PANEL - Abnormal; Notable for the following components: Total Bilirubin 0.2 (*)     ALT 9 (*)     All other components within normal limits   VRE SCREEN BY PCR   TROPONIN   BRAIN NATRIURETIC PEPTIDE   LACTATE, SEPSIS   ANION GAP   GLOMERULAR FILTRATION RATE, ESTIMATED   OSMOLALITY   BASIC METABOLIC PANEL W/ REFLEX TO MG FOR LOW K   CBC   MRSA BY PCR        ED COURSE & MEDICAL DECISION MAKING   Pertinent Labs & Imaging studies reviewed and interpreted. (See chart for details)   See chart for details of medications given during the ED stay. Vitals:    03/18/22 2310   BP: (!) 144/69   Pulse: 105   Resp: 18   Temp: 98.5 °F (36.9 °C)   SpO2: 100%        Differential Diagnosis: Stridor,upper respiratory infection, sepsis, other. FINAL IMPRESSION   1. Inspiratory stridor    2. Dyspnea and respiratory abnormalities    3. Tachycardia         Plan: Admission to the hospital       MDM - pt is not tripodding, not complaining of drooling, but has endorsed \"trouble breathing\". In between her words while speaking sentences, I detected signs of stridor that could be a result of numerous issues. It appears she's had issues with high-pitch issues but that it has been exacerbated in the past day or so. I do not think pt has an airway emergency, but certainly high flow oxygenation may help as well as a dose of steroids. In this case, I do believe pt needs to be admitted for evaluation and possible ENT consult. I spoke with Dr. Remington Galvin of ENT, who agreed [facility] plans of administered steroids, racemic epi and high flow oxygenation. Pt will be made NPO as he may proceed to go to the OR if he deemed the airway very narrow. He recommended a CT chest study prior to her admission. Dr. Jose A Orellana of hospitals medicine was gracious to admit to his service.     Electronically signed by: Tammy Vick MD, 3/18/2022 11:57 PM   (This note was completed with a voice recognition program)         Nupur Mobley MD  03/18/22 4114

## 2022-03-18 NOTE — ED TRIAGE NOTES
Pt presents to the ED via lobby with c/o SOB. Pt states she has had ongoing SOB but her symptoms worsened over the past two days. Pt states she was admitted to the hospital in December for 39 days. Of those 39 days she was intubated for 14 days. Upon pt is very SOB and has difficulty talking in full sentences. Pt also states that her chest feels tight. Pt presents of 4L O2 via NC from home. Pt states she is normally on 2L at home.

## 2022-03-19 LAB
ANION GAP SERPL CALCULATED.3IONS-SCNC: 11 MEQ/L (ref 8–16)
BUN BLDV-MCNC: 13 MG/DL (ref 7–22)
CALCIUM SERPL-MCNC: 9.4 MG/DL (ref 8.5–10.5)
CHLORIDE BLD-SCNC: 98 MEQ/L (ref 98–111)
CO2: 28 MEQ/L (ref 23–33)
CREAT SERPL-MCNC: 0.6 MG/DL (ref 0.4–1.2)
ERYTHROCYTE [DISTWIDTH] IN BLOOD BY AUTOMATED COUNT: 12.7 % (ref 11.5–14.5)
ERYTHROCYTE [DISTWIDTH] IN BLOOD BY AUTOMATED COUNT: 42 FL (ref 35–45)
GFR SERPL CREATININE-BSD FRML MDRD: > 90 ML/MIN/1.73M2
GLUCOSE BLD-MCNC: 224 MG/DL (ref 70–108)
GLUCOSE BLD-MCNC: 271 MG/DL (ref 70–108)
GLUCOSE BLD-MCNC: 279 MG/DL (ref 70–108)
GLUCOSE BLD-MCNC: 300 MG/DL (ref 70–108)
GLUCOSE BLD-MCNC: 320 MG/DL (ref 70–108)
GLUCOSE BLD-MCNC: 365 MG/DL (ref 70–108)
GLUCOSE BLD-MCNC: 367 MG/DL (ref 70–108)
GLUCOSE BLD-MCNC: 392 MG/DL (ref 70–108)
HCT VFR BLD CALC: 40.4 % (ref 37–47)
HEMOGLOBIN: 12.9 GM/DL (ref 12–16)
MCH RBC QN AUTO: 29.2 PG (ref 26–33)
MCHC RBC AUTO-ENTMCNC: 31.9 GM/DL (ref 32.2–35.5)
MCV RBC AUTO: 91.4 FL (ref 81–99)
MRSA SCREEN RT-PCR: NEGATIVE
PLATELET # BLD: 257 THOU/MM3 (ref 130–400)
PMV BLD AUTO: 9.9 FL (ref 9.4–12.4)
POTASSIUM REFLEX MAGNESIUM: 4.5 MEQ/L (ref 3.5–5.2)
RBC # BLD: 4.42 MILL/MM3 (ref 4.2–5.4)
SODIUM BLD-SCNC: 137 MEQ/L (ref 135–145)
VANCOMYCIN RESISTANT ENTEROCOCCUS: NEGATIVE
WBC # BLD: 10.7 THOU/MM3 (ref 4.8–10.8)

## 2022-03-19 PROCEDURE — 6370000000 HC RX 637 (ALT 250 FOR IP): Performed by: STUDENT IN AN ORGANIZED HEALTH CARE EDUCATION/TRAINING PROGRAM

## 2022-03-19 PROCEDURE — 2720000010 HC SURG SUPPLY STERILE

## 2022-03-19 PROCEDURE — 6360000002 HC RX W HCPCS: Performed by: STUDENT IN AN ORGANIZED HEALTH CARE EDUCATION/TRAINING PROGRAM

## 2022-03-19 PROCEDURE — 85027 COMPLETE CBC AUTOMATED: CPT

## 2022-03-19 PROCEDURE — 99233 SBSQ HOSP IP/OBS HIGH 50: CPT | Performed by: INTERNAL MEDICINE

## 2022-03-19 PROCEDURE — 99231 SBSQ HOSP IP/OBS SF/LOW 25: CPT | Performed by: OTOLARYNGOLOGY

## 2022-03-19 PROCEDURE — 2580000003 HC RX 258: Performed by: STUDENT IN AN ORGANIZED HEALTH CARE EDUCATION/TRAINING PROGRAM

## 2022-03-19 PROCEDURE — 36415 COLL VENOUS BLD VENIPUNCTURE: CPT

## 2022-03-19 PROCEDURE — 31575 DIAGNOSTIC LARYNGOSCOPY: CPT | Performed by: OTOLARYNGOLOGY

## 2022-03-19 PROCEDURE — 87500 VANOMYCIN DNA AMP PROBE: CPT

## 2022-03-19 PROCEDURE — 80048 BASIC METABOLIC PNL TOTAL CA: CPT

## 2022-03-19 PROCEDURE — 82948 REAGENT STRIP/BLOOD GLUCOSE: CPT

## 2022-03-19 PROCEDURE — 2060000000 HC ICU INTERMEDIATE R&B

## 2022-03-19 PROCEDURE — 2700000000 HC OXYGEN THERAPY PER DAY

## 2022-03-19 PROCEDURE — 6370000000 HC RX 637 (ALT 250 FOR IP): Performed by: INTERNAL MEDICINE

## 2022-03-19 RX ORDER — LIDOCAINE 4 G/G
1 PATCH TOPICAL DAILY
Status: DISCONTINUED | OUTPATIENT
Start: 2022-03-19 | End: 2022-03-23 | Stop reason: HOSPADM

## 2022-03-19 RX ADMIN — INSULIN LISPRO 6 UNITS: 100 INJECTION, SOLUTION INTRAVENOUS; SUBCUTANEOUS at 11:25

## 2022-03-19 RX ADMIN — FAMOTIDINE 20 MG: 20 TABLET ORAL at 09:51

## 2022-03-19 RX ADMIN — INSULIN LISPRO 2 UNITS: 100 INJECTION, SOLUTION INTRAVENOUS; SUBCUTANEOUS at 21:38

## 2022-03-19 RX ADMIN — FAMOTIDINE 20 MG: 20 TABLET ORAL at 00:07

## 2022-03-19 RX ADMIN — FUROSEMIDE 40 MG: 40 TABLET ORAL at 09:51

## 2022-03-19 RX ADMIN — ASPIRIN 81 MG CHEWABLE TABLET 81 MG: 81 TABLET CHEWABLE at 09:52

## 2022-03-19 RX ADMIN — INSULIN LISPRO 8 UNITS: 100 INJECTION, SOLUTION INTRAVENOUS; SUBCUTANEOUS at 17:12

## 2022-03-19 RX ADMIN — SODIUM CHLORIDE, PRESERVATIVE FREE 10 ML: 5 INJECTION INTRAVENOUS at 09:52

## 2022-03-19 RX ADMIN — ROSUVASTATIN CALCIUM 10 MG: 10 TABLET, FILM COATED ORAL at 09:51

## 2022-03-19 RX ADMIN — SODIUM CHLORIDE, PRESERVATIVE FREE 10 ML: 5 INJECTION INTRAVENOUS at 00:16

## 2022-03-19 RX ADMIN — INSULIN LISPRO 3 UNITS: 100 INJECTION, SOLUTION INTRAVENOUS; SUBCUTANEOUS at 00:14

## 2022-03-19 RX ADMIN — FAMOTIDINE 20 MG: 20 TABLET ORAL at 21:26

## 2022-03-19 RX ADMIN — Medication 6 MG: at 21:26

## 2022-03-19 RX ADMIN — INSULIN LISPRO 5 UNITS: 100 INJECTION, SOLUTION INTRAVENOUS; SUBCUTANEOUS at 06:42

## 2022-03-19 RX ADMIN — DOCUSATE SODIUM 100 MG: 100 CAPSULE, LIQUID FILLED ORAL at 09:52

## 2022-03-19 RX ADMIN — DOCUSATE SODIUM 100 MG: 100 CAPSULE, LIQUID FILLED ORAL at 21:26

## 2022-03-19 RX ADMIN — SODIUM CHLORIDE, PRESERVATIVE FREE 10 ML: 5 INJECTION INTRAVENOUS at 21:26

## 2022-03-19 RX ADMIN — DOCUSATE SODIUM 100 MG: 100 CAPSULE, LIQUID FILLED ORAL at 00:07

## 2022-03-19 RX ADMIN — Medication 6 MG: at 00:07

## 2022-03-19 RX ADMIN — METHYLPREDNISOLONE SODIUM SUCCINATE 40 MG: 40 INJECTION, POWDER, FOR SOLUTION INTRAMUSCULAR; INTRAVENOUS at 21:26

## 2022-03-19 ASSESSMENT — PAIN SCALES - GENERAL
PAINLEVEL_OUTOF10: 0
PAINLEVEL_OUTOF10: 0
PAINLEVEL_OUTOF10: 5

## 2022-03-19 ASSESSMENT — PAIN DESCRIPTION - ORIENTATION: ORIENTATION: RIGHT

## 2022-03-19 ASSESSMENT — PAIN DESCRIPTION - DESCRIPTORS: DESCRIPTORS: RADIATING

## 2022-03-19 ASSESSMENT — PAIN DESCRIPTION - PAIN TYPE: TYPE: ACUTE PAIN

## 2022-03-19 ASSESSMENT — PAIN DESCRIPTION - LOCATION: LOCATION: LEG

## 2022-03-19 ASSESSMENT — PAIN DESCRIPTION - FREQUENCY: FREQUENCY: INTERMITTENT

## 2022-03-19 NOTE — CONSULTS
Department of Otolaryngology  Consult Note    Reason for Consult:  SOA  Requesting Physician:  Keiko Richards    CHIEF COMPLAINT:  SOA    History Obtained From:  patient    HISTORY OF PRESENT ILLNESS:                The patient is a 73 y.o. female who was admitted to [address] [place] [address] on 3/18 for difficulty breathing. She had Covid at the end of December, for time due to that she was extubated in January, and has been at home 2 L of oxygen. After being extubated, she had a hoarse voice. During the past 6 weeks as she has been at home, her breathing has gotten significantly worse. Her stridor has become more audible, and she can no longer tolerate anytime at home off the oxygen. She was admitted from the emergency room last night, given steroids, lasix, famotidine, racemic epi neb, and albuterol. She states that her breathing feels much better than it did last night. Also she thinks her voice is better now than it has been since her discharge in January.     Past Medical History:        Diagnosis Date    Coronary artery disease     COVID     Primary hypertension     Type 2 diabetes mellitus ([facility] [address]        Past Surgical History:        Procedure Laterality Date    CARDIAC SURGERY      CATARACT REMOVAL Bilateral      SECTION      3 times    CORONARY ANGIOPLASTY WITH STENT PLACEMENT      stenting twice    EYE SURGERY      HIATAL HERNIA REPAIR      late     HYSTERECTOMY, TOTAL ABDOMINAL      in  [address]      in        Current Medications:   Current Facility-Administered Medications: insulin lispro (HUMALOG) injection vial 0-12 Units, 0-12 Units, SubCUTAneous, Q6H  sodium chloride flush 0.9 % injection 5-40 mL, 5-40 mL, IntraVENous, 2 times per day  sodium chloride flush 0.9 % injection 5-40 mL, 5-40 mL, IntraVENous, PRN  0.9 % sodium chloride infusion, 25 mL, IntraVENous, PRN  ondansetron (ZOFRAN-ODT) disintegrating tablet 4 mg, 4 mg, Oral, Q8H PRN **OR** ondansetron (ZOFRAN) injection 4 mg, 4 mg, IntraVENous, Q6H PRN  polyethylene glycol (GLYCOLAX) packet 17 g, 17 g, Oral, Daily PRN  acetaminophen (TYLENOL) tablet 650 mg, 650 mg, Oral, Q6H PRN **OR** acetaminophen (TYLENOL) suppository 650 mg, 650 mg, Rectal, Q6H PRN  potassium chloride (KLOR-CON M) extended release tablet 40 mEq, 40 mEq, Oral, PRN **OR** potassium bicarb-citric acid (EFFER-K) effervescent tablet 40 mEq, 40 mEq, Oral, PRN **OR** potassium chloride 10 mEq/100 mL IVPB (Peripheral Line), 10 mEq, IntraVENous, PRN  magnesium sulfate 2000 mg in 50 mL IVPB premix, 2,000 mg, IntraVENous, PRN  albuterol sulfate  (90 Base) MCG/ACT inhaler 2 puff, 2 puff, Inhalation, Q6H PRN  aspirin chewable tablet 81 mg, 81 mg, Oral, Daily  docusate sodium (COLACE) capsule 100 mg, 100 mg, Oral, BID  famotidine (PEPCID) tablet 20 mg, 20 mg, Oral, BID  furosemide (LASIX) tablet 40 mg, 40 mg, Oral, Daily  hydrOXYzine (VISTARIL) capsule 25 mg, 25 mg, Oral, 4x Daily PRN  melatonin tablet 6 mg, 6 mg, Oral, Nightly  rosuvastatin (CRESTOR) tablet 10 mg, 10 mg, Oral, Daily  senna (SENOKOT) tablet 8.6 mg, 1 tablet, Oral, BID PRN  glucagon (rDNA) injection 1 mg, 1 mg, IntraMUSCular, PRN  dextrose 5 % solution, 100 mL/hr, IntraVENous, PRN  racepinephrine HCl (VAPONEFPRIN) 2.25 % nebulizer solution NEBU 11.25 mg, 11.25 mg, Nebulization, Q4H PRN  sodium chloride nebulizer 0.9 % solution 3 mL, 3 mL, Nebulization, Q4H PRN  methylPREDNISolone sodium (SOLU-MEDROL) injection 40 mg, 40 mg, IntraVENous, Daily  albuterol (PROVENTIL) nebulizer solution 2.5 mg, 2.5 mg, Nebulization, Q6H PRN  dextrose bolus (hypoglycemia) 10% 125 mL, 125 mL, IntraVENous, PRN  glucose chewable tablet 4 each, 4 tablet, Oral, PRN    Allergies:  Review of patient's allergies indicates no known allergies.     Social History:    Socioeconomic History    Marital status:        Spouse name: bailee     Number of children: 3    Years of education: None  Highest education level: None   Occupational History    None   Tobacco Use    Smoking status: Never Smoker    Smokeless tobacco: Never Used   Vaping Use    Vaping Use: Never used    Passive vaping exposure: Yes   Substance and Sexual Activity    Alcohol use: Not Currently       Comment: drinks 1 glass of wine cooler or wine about 3 times per year    Drug use: Never    Sexual activity: Not Currently   Other Topics Concern    None   Social History Narrative    None         Family History:       Problem Relation Age of Onset    Parkinson's Disease Father     Lung Cancer Father        REVIEW OF SYSTEMS:    A complete multi-organ review of systems was performed using a new patient questionnaire, and reviewed by me. ENT:  negative except as noted in HPI  CONSTITUTIONAL:  negative except as noted in HPI  EYES:  negative except as noted in HPI  RESPIRATORY:  negative except as noted in HPI  CARDIOVASCULAR:  negative except as noted in HPI  GASTROINTESTINAL:  negative except as noted in HPI  GENITOURINARY:  negative except as noted in HPI  MUSCULOSKELETAL:  negative except as noted in HPI  SKIN:  negative except as noted in HPI  ENDOCRINE/METABOLIC: negative except as noted in HPI  HEMATOLOGIC/LYMPHATIC:  negative except as noted in HPI  ALLERGY/IMMUN: negative except as noted in HPI  NEUROLOGICAL:  negative except as noted in HPI  BEHAVIOR/PSYCH:  negative except as noted in HPI    PHYSICAL EXAM:    VITALS:  /86   Pulse 87   Temp 97.9 °F (36.6 °C) (Oral)   Resp 20   Ht 5' 2\" (1.575 m)   Wt 196 lb (88.9 kg)   LMP  (LMP Unknown)   SpO2 100%   BMI 35.85 kg/m²     ABNORMAL OTOLARYNGOLOGIC EXAM FINDINGS: Obese. Audible airway turbulence with inspiration and expiration. She is able to talk in complete sentences, with Voice: grade 2, roughness 2, breathiness 0, asthenia 2, strain 1.      OTHER PERTINENT EXAM FINDINGS:  GENERAL: Awake, NAD, Non-toxic appearing  NEUROLOGICAL: GCS 15, Cranial nerves II-VI, VIII-XII grossly intact,  Facial nerve (VII) w/ House-Brackman Grade 1 of 6 bilaterally, No evidence of nystagmus or gross asymmetry    PSYCHIATRIC: Mood and Affect appropriate. HEAD: NC/AT, Sinuses non-tender on palpation  SKIN: No overtly ulcerated, cellulitic, or indurated lesions of the head or neck. EARS: Pinna well-formed,  EAC non-stenotic w/o cerumen impaction AU,  TM's intact AU w/o effusion or active infection appreciated  EYES: EOMI, PERRLA, No APD, No ptosis appreciated   NOSE: Dorsum w/o scar or deformity,  No mucopurulence appreciated, Patent nasal airways bilaterally. ORAL CAVITY: No mucosal masses/lesions appreciated,  Dentition normal,  FOM soft, Tongue with FROM. Appropriate CHASE w/o trismus. OROPHARYNX: No mucosal masses or lesions appreciated. BOT soft,  Symmetric palatal elevation w/o draping. NECK: Soft, supple. No crepitus or masses appreciated,  Trachea midline. CHEST: Breathing is unlabored without retraction  CARDIOVASCULAR: Heart w/ RRR. No peripheral cyanosis. Cap refill <3 seconds  ABDOMEN: Soft, Benign  MUSCULOSKELETAL: Moves all extremities equally. No clubbing, cyanosis, or edema      DATA:    I personally reviewed her chest CT. There is evidence of glottic and subglottic stenosis. The exam was somewhat limited by motion artifact, and poor sagittal reconstruction. Procedure performed: Flexible Nasolaryngoscopy 49937  Attending: Tony Gatica MD  Anesthesia: Topical lidocaine 4% with oxymetazoline  Blood loss: None  Specimens and Cultures: None  Procedure: The patient was taken to the procedure room and placed upright in the chair. Local anesthesia was administered to the bilateral nasal passageways. A flexible nasolaryngoscope was then passed through the nose and the hypopharynx and larynx were examined. Findings:            NASAL CAVITY/NASOPHARYNX: No masses/lesions/polyposis/mucosal ulceration,  Bilateral eustachian tube orifices non-obstructed. OROPHARYNX: BOT, vallecula, posterior tonsillar pillars w/o masses/lesions/mucosal ulceration. No RP bulging. HYPOPHARYNX: No masses/lesions/mucosal ulceration of piriform sinuses or post-cricoid region. No significant pooling of secretions. GLOTTIS/SUPRAGLOTTIS: Lingual & laryngeal surface of epiglottis, AE folds, arytenoids, false vocal folds w/o masses/lesions/mucosal ulceration. True vocal folds with minimal excursion. The left cord appears to be fixed, with minimal movement of the right cord. I cannot evaluate the subglottis due to this. Patient tolerated the procedure well and without complications. Post procedure condition is stable. 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 She does have significant posterior glottic stenosis, with approximately 2 mm airway. I do think she would benefit from suspension microlaryngoscopy with airway dilation. We will try to schedule the procedure early next week. Continue Epi nebs, steroids, reflux ppx, HOB elevation, HFNC. Please page me if breathing worsens. BiPAP would be next step. Remainder of care per primary.        Electronically signed by Jung Murray MD on 3/19/2022 at 11:25 AM

## 2022-03-19 NOTE — PROGRESS NOTES
Hospitalist Progress Note    Patient: Kina Avery  Unit/Bed: 9R-42/092-H  YOB: 1950  MRN: [id number]  Acct: [de-identified]    PCP: Timo Merrill    Date of Admission: 3/18/2022    Assessment/Plan:    Acute on chronic hypoxic respiratory failure - requires 2 L/min nasal cannula as needed at baseline secondary to recent COVID-19 hospitalization. She is now requiring high flow nasal cannula to maintain adequate oxygen saturations.  -Wean supplemental oxygen to maintain SPO2 greater than 90% as tolerated  -If patient experiences worsening in airway obstruction escalation to BiPAP would be necessary with further escalation to reintubation. Posterior glottic stenosis - Secondary intubation from 12/20/21-1/2/22 for COVID-19 infection with current symptoms of stridor. This is evidenced on CTA chest 3/18/22. -ENT is following; hopeful plan for possible suspension microlaryngoscopy with airway dilatation. Patient will be seen by Dr. Guerda George on 3/21/2022 for reevaluation.  -Continue epi nebs, steroids, reflux prophylaxis  -Maintain elevate head of bed greater than 30 degrees    SIRS - Present on arrival 2/4 (leukocytosis and tachycardia). There is no evidence of infection on physical exam or laboratory review. Leukocytosis is secondary to steroid use outpatient and there is no indication for fluid resuscitation or antibiotic therapy at this time.  -Continue to monitor for signs and symptoms of infection. Primary hypertension - Currently normotensive  -Continue to monitor for hypertensive events.  -Continue Lasix    Hx of CAD - s/p PCI with bare metal stent to LAD 4/2008 with in stent restenosis treated by cutting balloon and bare metal stent 10/2008. There was 50% stenosis of RCA at that time. - stent placement in 2008.  Was following with Dr. Janine Hinds at [phone] daily ASA and statin    Compensated systolic heart failure NYHA III - ECHO on 12/21/2021 demonstrating EF-30-35% with severe global hypokinesis of the left ventricle. Patient does not appear to be on goal-directed medical therapy. - Continue Lasix  - Recommend follow-up with cardiology outpatient for GDMT evaluation  - Repeat limited echo; with previous low EF there is concern for LifeVest needs at discharge    NIDDM 2 - Uncontrolled. A1c =9.9% (12/21/2021). Home regimen includes glipizide.  -Hold home glipizide  -Medium dose sliding scale insulin, hypoglycemic protocol, POCT glucose checks 4 times daily AC/HS  -Inpatient blood glucose goal 1 4180. Hx of anxiety - noted. - Vistaril as needed    Hx of sciatica - noted.  -Lidocaine patch to the affected area. Expected discharge date:  TBD    Disposition:    [x] Home       [] TCU       [] Rehab       [] Psych       [] SNF       [] [facility]       [] Other -    Chief Complaint: shortness of breath and hoarseness    Hospital Course:  Kina Avery is a 73 y.o. 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 The patient was discharged home with 2 L oxygen via nasal cannula as needed and she has since been seeing her primary care physician as well as an urgent care center with encouragement to use incentive spirometry, Flovent inhaler, and steroids. She stated that her shortness of breath and hoarseness has gotten to the point where she feels a sensation of syncope but without actual event. She has been monitoring her home oxygen saturation and reports this value dropping into the mid 70s with ambulation on room air.     Within the emergency department chest x-ray, laboratory studies, and CT of the chest only demonstrated mild interstitial infiltrates with peripheral opacities reported as improved from prior CT on 12/18/2021. This is indicative of resolving pneumonia versus interstitial lung disease secondary to COVID-19 infection. Review of the chest x-ray appears concerning for steeple sign which is pathognomonic for tracheal stenoses. ENT was consulted and the patient received steroids, racemic epinephrine, and initiated on high flow oxygen. The patient was subsequently admitted to the hospital service for further care and management. Subjective (past 24 hours): Per nursing, no acute events overnight. The patient stated that she has decreased shortness of breath and her respiratory effort has improved since administration of steroids and nebulized treatment. Her only complaint today is right-sided sciatic pain for which she is amenable to lidocaine patch therapy. All questions and concerns were addressed. Review of Systems: 12 point review of systems completed and pertinent positives are noted in the HPI. All other systems reviewed and negative.     Medications:  Reviewed    Infusion Medications    sodium chloride      dextrose       Scheduled Medications    sodium chloride flush  5-40 mL IntraVENous 2 times per day    aspirin  81 mg Oral Daily    docusate sodium  100 mg Oral BID    famotidine  20 mg Oral BID    furosemide  40 mg Oral Daily    melatonin  6 mg Oral Nightly    rosuvastatin  10 mg Oral Daily    insulin lispro  0-6 Units SubCUTAneous TID WC    insulin lispro  0-3 Units SubCUTAneous Nightly    methylPREDNISolone  40 mg IntraVENous Daily     PRN Meds: sodium chloride flush, sodium chloride, ondansetron **OR** ondansetron, polyethylene glycol, acetaminophen **OR** acetaminophen, potassium chloride **OR** potassium alternative oral replacement **OR** potassium chloride, magnesium sulfate, albuterol sulfate HFA, hydrOXYzine, senna, glucagon (rDNA), dextrose, racepinephrine HCl, sodium chloride nebulizer, albuterol, dextrose bolus (hypoglycemia), glucose      Intake/Output Summary (Last 24 hours) at 3/19/2022 0732  Last data filed at 3/19/2022 0315  Gross per 24 hour   Intake 300 ml   Output 0 ml   Net 300 ml       Diet:  Diet NPO    Exam:  /83   Pulse 94   Temp 98.1 °F (36.7 °C) (Oral)   Resp 18   Ht 5' 2\" (1.575 m)   Wt 196 lb (88.9 kg)   LMP  (LMP Unknown)   SpO2 98%   BMI 35.85 kg/m²     Physical Exam  Vitals and nursing note reviewed. Constitutional:       General: She is awake. Appearance: Normal appearance. She is obese. Interventions: Nasal cannula in place. HENT:      Head: Normocephalic and atraumatic. Right Ear: External ear normal.      Left Ear: External ear normal.      Nose: Nose normal.      Mouth/Throat:      Mouth: Mucous membranes are moist.      Pharynx: Oropharynx is clear. Eyes:      Conjunctiva/sclera: Conjunctivae normal.      Pupils: Pupils are equal, round, and reactive to light. Cardiovascular:      Rate and Rhythm: Normal rate and regular rhythm. Pulses: Normal pulses. Dorsalis pedis pulses are 2+ on the right side and 2+ on the left side. Posterior tibial pulses are 2+ on the right side and 2+ on the left side. Heart sounds: Normal heart sounds. Pulmonary:      Effort: Pulmonary effort is normal. No accessory muscle usage. Breath sounds: Stridor and transmitted upper airway sounds present. Decreased breath sounds present. Abdominal:      General: Bowel sounds are normal.      Palpations: Abdomen is soft. Musculoskeletal:         General: Normal range of motion. Cervical back: Normal range of motion and neck supple. Right lower le+ Pitting Edema present. Left lower le+ Pitting Edema present. Skin:     General: Skin is warm and dry. Capillary Refill: Capillary refill takes less than 2 seconds. Neurological:      General: No focal deficit present.       Mental Status: She is alert and oriented to person, place, and time. Mental status is at baseline. GCS: GCS eye subscore is 4. GCS verbal subscore is 5. GCS motor subscore is 6. Psychiatric:         Attention and Perception: Attention and perception normal.         Mood and Affect: Mood and affect normal.         Speech: Speech normal.         Behavior: Behavior normal. Behavior is cooperative. Thought Content: Thought content normal.         Cognition and Memory: Cognition and memory normal.         Judgment: Judgment normal.         Labs:  Recent Labs     03/18/22 1922 03/19/22  0436   WBC 14.6* 10.7   HGB 13.5 12.9   HCT 42.4 40.4    257     Recent Labs     03/18/22 1922 03/19/22  0436    137   K 4.3 4.5   CL 97* 98   CO2 31 28   BUN 10 13   CREATININE 0.5 0.6   CALCIUM 9.6 9.4     Recent Labs     03/18/22 1922   AST 16   ALT 9*   BILIDIR <0.2   BILITOT 0.2*   ALKPHOS 101     No results for input(s): INR in the last 72 hours. No results for input(s): Meldon Becerra in the last 72 hours. Microbiology:    Urinalysis:   Lab Results   Component Value Date    NITRU NEGATIVE 01/10/2022    WBCUA 15-25 01/10/2022    BACTERIA NONE 01/10/2022    RBCUA > 200 01/10/2022    BLOODU LARGE 01/10/2022    SPECGRAV 1.026 12/31/2021    GLUCOSEU NEGATIVE 01/10/2022       Radiology:  CTA Chest W WO Contrast   Final Result   Impression:   1. No pulmonary emboli. 2. No thoracic aortic aneurysm or dissection   3. Bilateral peripheral opacities are improved since prior CT of    12/18/2021 and may represent resolving pneumonia versus interstitial lung    disease      This document has been electronically signed by: Jossue Bernard MD on    03/18/2022 09:20 PM      All CTs at this facility use dose modulation techniques and iterative    reconstructions, and/or weight-based dosing   when appropriate to reduce radiation to a low as reasonably achievable. XR CHEST PORTABLE   Final Result   Possible mild interstitial infiltrates.  Interval improvement in aeration    since the prior study. This document has been electronically signed by: Deon Mccullough MD on    03/18/2022 07:47 PM          DVT prophylaxis:  [] Lovenox  [] SCDs  [] SQ Heparin  [] Encourage ambulation  [] Already on anticoagulation  [] Other -      Code Status: Full Code    PT/OT Evaluation Status: Ordered    Telemetry:  [x] Yes  [] No    Electronically signed by Tez Angulo DO, MBA on 3/19/2022 at 7:32 AM

## 2022-03-19 NOTE — PROGRESS NOTES
Pt admitted to  [phone] from ED. Vital signs obtained. Assessment and data collection initiated. Two nurse skin assessment performed by Shawna CRAWFORD and [facility]. Oriented to room. Policies and procedures for 4[facility] explained. All questions answered with no further questions at this time. Fall prevention and safety brochure discussed with patient. Bed alarm on. Call light in reach.

## 2022-03-19 NOTE — H&P
History & Physical     Patient: Chau Quach  YOB: 1950    MRN: [id number]     Acct: [de-identified]    PCP: Kvng Farris    Date of Admission: 3/18/2022    Date of Service: Patient seen / examined on 03/18/22 and admitted to Inpatient with expected LOS greater than two midnights due to medical therapy. ASSESSMENT / PLAN:  1. Acute on Chronic Hypoxic Respiratory Failure: Patient is on 2L O2 via NC PRN at baseline due to recent COVID-19 hospitalization. Continue HFNC - do not wean to NC until ENT recommendations. 2. Stridor: Concern for tracheal stenosis with hoarseness and Steeple Sign noted on CXR. ENT has been consulted who recommended steroids, racemic epinephrine, and HFNC in the ED. ENT will see patient in AM to determine need for intervention. NPO after midnight. Start Solu-Medrol 40mg IV x5 days, Racemic Epinephrine PRN, Albuterol PRN. 3. SIRS: 2/4 (Leukocytosis, Tachycardia). No evidence of infection at this time. Lactic Acid normal. Leukocytosis likely due to steroid use outpatient. No indication for fluids/antibiotics at this time. 4. HTN: Does not appear to be on anti-hypertensives besides Lasix. Will monitor VS for now. Recommend PCP follow-up. 5. Hx CAD s/p PCI: Only on daily ASA. Last stent in 2008. Denies CP at this time. 6. Compensated HFrEF 30-35%: Euvolemic on physical exam. Not on GDMT (BB, Entresto) - Defer to PCP/Cardiologist. Continue Lasix 40mg QD, daily weights, I/O's.    7. NIDDM2, uncontrolled: HgbA1C 9.9% on 12/21/21. Start SSI. Hypoglycemic protocols in place. 8. Anxiety: Vistaril PRN    Chief Complaint:  SOB    History of Present Illness:  Patient is a 72 y/o  Female with PMH DM2, HTN, CAD s/p PCI, and Anxiety. Her  is at bedside. She presents to [facility] ED with complaints of shortness of breath and hoarseness that has been progressively worsening since she was discharged home after being admitted for COVID-19.  On chart review she was admitted from - and required mechanical ventilation from -. She was discharged home with 2L O2 via NC as needed. She states she has seen her PCP and urgent care and they only encouraged her to continue using her incentive spirometer, prescribed steroids, and Flovent inhaler. She states her hoarseness and shortness of breath has gotten to the point where she feels like she's \"going to die\" and pass out. She also reports her oxygen dropping to 70s with ambulation on room air. She also reports she has had a cough, but not able to bring up anything. She states she feels it coming up, but then it goes back down her throat. She denies any lightheadedness, dizziness, chest pain, abdominal pain, vomiting, diarrhea. In the ED labs are not significant besides for WBC 14.6. CXR reported possible mild interstitial infiltrates. CTA chest reports no PE; bilateral peripheral opacities ae improved since prior CT of 21 and may represent resolving pneumonia vs. Interstitial lung disease. Upon personal review of imaging there appears to be concern for Steeple Sign / tracheal stenosis. ED has consulted ENT who recommended Steroids, Racemic Epinephrine, and High Flow Oxygen. Past Medical History:    Past Medical History:   Diagnosis Date    Coronary artery disease     Primary hypertension     Type 2 diabetes mellitus ([address]      Past Surgical History:    Past Surgical History:   Procedure Laterality Date    CATARACT REMOVAL Bilateral      SECTION      3 times    CORONARY ANGIOPLASTY WITH STENT PLACEMENT      stenting twice    HIATAL HERNIA REPAIR      late     HYSTERECTOMY, TOTAL ABDOMINAL      in [address]      in       Medications Prior to Admission:   No current facility-administered medications on file prior to encounter.      Current Outpatient Medications on File Prior to Encounter   Medication Sig Dispense Refill    hydrOXYzine (VISTARIL) 25 MG capsule Take 1 capsule by mouth 4 times daily as needed for Anxiety 60 capsule 1    albuterol sulfate  (90 Base) MCG/ACT inhaler Inhale 2 puffs into the lungs every 6 hours as needed for Wheezing 18 g 3    glipiZIDE (GLUCOTROL) 5 MG tablet Take 1 tablet by mouth 2 times daily (with meals) 60 tablet 3    miconazole (MICOTIN) 2 % powder Apply topically 2 times daily. 45 g 1    diclofenac sodium (VOLTAREN) 1 % GEL Apply 2 g topically 4 times daily as needed for Pain 150 g 3    furosemide (LASIX) 40 MG tablet Take 1 tablet by mouth daily 60 tablet 3    docusate sodium (COLACE) 100 MG capsule Take 1 capsule by mouth 2 times daily 60 capsule 3    polycarbophil (FIBERCON) 625 MG tablet Take 1 tablet by mouth daily  4    senna (SENOKOT) 8.6 MG tablet Take 1 tablet by mouth 2 times daily as needed (Constipation) 60 tablet 1    melatonin 3 MG TABS tablet Take 2 tablets by mouth nightly 60 tablet 3    famotidine (PEPCID) 20 MG tablet Take 1 tablet by mouth 2 times daily 60 tablet 3    OXYGEN Inhale 2 L/min into the lungs continuous prn (during activities such as walking) 1 Canister 5    rosuvastatin (CRESTOR) 10 MG tablet Take 10 mg by mouth daily      nitroGLYCERIN (NITROSTAT) 0.4 MG SL tablet Place 0.4 mg under the tongue every 5 minutes as needed for Chest pain up to max of 3 total doses. If no relief after 1 dose, call 911.       aspirin 81 MG chewable tablet Take 81 mg by mouth daily       Allergies:   Metformin and related, Sulfa antibiotics, and Codeine    Social History:   Social History     Socioeconomic History    Marital status:      Spouse name: bailee     Number of children: 3    Years of education: Not on file    Highest education level: Not on file   Occupational History    Not on file   Tobacco Use    Smoking status: Never Smoker    Smokeless tobacco: Never Used   Vaping Use    Vaping Use: Never used    Passive vaping exposure: Yes   Substance and Sexual Activity    Alcohol use: Not Currently     Comment: drinks 1 glass of wine cooler or wine about 3 times per year    Drug use: Never    Sexual activity: Not Currently   Other Topics Concern    Not on file   Social History Narrative    Not on file     Social Determinants of Health     Financial Resource Strain:     Difficulty of Paying Living Expenses: Not on file   Food Insecurity:     Worried About Running Out of Food in the Last Year: Not on file    Benjamin of Food in the Last Year: Not on file   Transportation Needs:     Lack of Transportation (Medical): Not on file    Lack of Transportation (Non-Medical):  Not on file   Physical Activity:     Days of Exercise per Week: Not on file    Minutes of Exercise per Session: Not on file   Stress:     Feeling of Stress : Not on file   Social Connections:     Frequency of Communication with Friends and Family: Not on file    Frequency of Social Gatherings with Friends and Family: Not on file    Attends Jewish Services: Not on file    Active Member of [address] [place] [address] or Organizations: Not on file    Attends Club or Organization Meetings: Not on file    Marital Status: Not on file   Intimate Partner Violence:     Fear of Current or Ex-Partner: Not on file    Emotionally Abused: Not on file    Physically Abused: Not on file    Sexually Abused: Not on file   Housing Stability:     Unable to Pay for Housing in the Last Year: Not on file    Number of [place] in the Last Year: Not on file    Unstable Housing in the Last Year: Not on file     Family History:    Family History   Problem Relation Age of Onset    Parkinson's Disease Father     Lung Cancer Father      REVIEW OF SYSTEMS:  A 14-point ROS was obtained and negative, with the exception of pertinent positives as listed below:  +Hoarseness, SOB, HA    PHYSICAL EXAM:  Vitals:    03/18/22 1913 03/18/22 1939 03/18/22 2009   BP: (!) 159/93 139/69 (!) 154/98   Pulse: 119 107 110   Resp: 18 24 24   Temp: 98.5 °F (36.9 °C) TempSrc: Oral     SpO2: 100% 100% 100%   Weight: 196 lb (88.9 kg)     Height: 5' 2\" (1.575 m)       General appearance: Alert / ill-appearing  female. Cooperative. NAD. Hoarse voice on conversation. HEENT:  Normocephalic / atraumatic. PERRL. EOM intact. Conjunctivae appear normal.  Neck: Supple. No JVD. Respiratory: Normal respiratory effort on HFNC. Mild stridor appreciated on auscultation. No wheezes / rales / rhonchi. Cardiovascular: Tachycardic, regular rhythm. Normal S1/S2. No murmurs / rubs / gallops. Abdomen: Soft / non-tender / non-distended. BS present. Musculoskeletal: No cyanosis or edema. Skin: Warm / dry. Normal turgor. Neurologic: A/O x 3. Speech normal. Answers questions appropriately. CN intact. No obvious focal neurologic deficits. Psychiatric: Thought content / judgment / insight appear appropriate. Capillary refill: Brisk bilaterally. Peripheral pulses: +2 bilaterally.     Labs:   Results for orders placed or performed during the hospital encounter of 00/16/52   Basic Metabolic Panel   Result Value Ref Range    Sodium 139 135 - 145 meq/L    Potassium 4.3 3.5 - 5.2 meq/L    Chloride 97 (L) 98 - 111 meq/L    CO2 31 23 - 33 meq/L    Glucose 192 (H) 70 - 108 mg/dL    BUN 10 7 - 22 mg/dL    CREATININE 0.5 0.4 - 1.2 mg/dL    Calcium 9.6 8.5 - 10.5 mg/dL   CBC with Auto Differential   Result Value Ref Range    WBC 14.6 (H) 4.8 - 10.8 thou/mm3    RBC 4.59 4.20 - 5.40 mill/mm3    Hemoglobin 13.5 12.0 - 16.0 gm/dl    Hematocrit 42.4 37.0 - 47.0 %    MCV 92.4 81.0 - 99.0 fL    MCH 29.4 26.0 - 33.0 pg    MCHC 31.8 (L) 32.2 - 35.5 gm/dl    RDW-CV 12.9 11.5 - 14.5 %    RDW-SD 43.7 35.0 - 45.0 fL    Platelets 822 873 - 389 thou/mm3    MPV 9.9 9.4 - 12.4 fL    Seg Neutrophils 86.2 %    Lymphocytes 8.2 %    Monocytes 4.4 %    Eosinophils 0.5 %    Basophils 0.1 %    Immature Granulocytes 0.6 %    Segs Absolute 12.6 (H) 1.8 - 7.7 thou/mm3    Lymphocytes Absolute 1.2 1.0 - 4.8 thou/mm3 Monocytes Absolute 0.6 0.4 - 1.3 thou/mm3    Eosinophils Absolute 0.1 0.0 - 0.4 thou/mm3    Basophils Absolute 0.0 0.0 - 0.1 thou/mm3    Immature Grans (Abs) 0.09 (H) 0.00 - 0.07 thou/mm3    nRBC 0 /100 wbc   Troponin   Result Value Ref Range    Troponin T < 0.010 ng/ml   Hepatic Function Panel   Result Value Ref Range    Albumin 4.3 3.5 - 5.1 g/dL    Total Bilirubin 0.2 (L) 0.3 - 1.2 mg/dL    Bilirubin, Direct <0.2 0.0 - 0.3 mg/dL    Alkaline Phosphatase 101 38 - 126 U/L    AST 16 5 - 40 U/L    ALT 9 (L) 11 - 66 U/L    Total Protein 7.6 6.1 - 8.0 g/dL   Brain Natriuretic Peptide   Result Value Ref Range    Pro-BNP 35.2 0.0 - 900.0 pg/mL   Lactate, Sepsis   Result Value Ref Range    Lactic Acid, Sepsis 0.7 0.5 - 1.9 mmol/L   Anion Gap   Result Value Ref Range    Anion Gap 11.0 8.0 - 16.0 meq/L   Glomerular Filtration Rate, Estimated   Result Value Ref Range    Est, Glom Filt Rate >90 ml/min/1.73m2   Osmolality   Result Value Ref Range    Osmolality Calc 281.8 275.0 - 300.0 mOsmol/kg   EKG SOB   Result Value Ref Range    Ventricular Rate 115 BPM    Atrial Rate 115 BPM    P-R Interval 156 ms    QRS Duration 82 ms    Q-T Interval 346 ms    QTc Calculation (Bazett) 478 ms    P Axis 47 degrees    R Axis 3 degrees    T Axis 62 degrees       EKG / Radiology:     EKG:  Reviewed by me --    CXR:   Reviewed by me --    CTA Chest W WO Contrast    Result Date: 3/18/2022  Exam: CTA chest with IV contrast. Procedure: MIP reconstructions were performed Comparison: 3/18/2022, 12/18/2021 Clinical history: Stridor, tachycardia Findings: Evaluation limited by patient motion artifact. No pulmonary emboli. No thoracic aortic aneurysm or dissection. Atherosclerotic calcifications are seen at the aorta and coronary arteries. No hilar or mediastinal adenopathy. No pericardial fluid collection. No pleural fluid collections. No pneumothorax.  Bilateral peripheral opacities, improved from prior exam of 12/18/2021 may represent resolving pneumonia versus interstitial lung disease Visualized liver and spleen do not demonstrate any acute process No thoracic compression fracture. Impression: 1. No pulmonary emboli. 2. No thoracic aortic aneurysm or dissection 3. Bilateral peripheral opacities are improved since prior CT of 12/18/2021 and may represent resolving pneumonia versus interstitial lung disease This document has been electronically signed by: Mahogany Milton MD on 03/18/2022 09:20 PM All CTs at this facility use dose modulation techniques and iterative reconstructions, and/or weight-based dosing when appropriate to reduce radiation to a low as reasonably achievable. XR CHEST PORTABLE    Result Date: 3/18/2022  1 view chest x-ray Comparison: CR,SR - XR CHEST PORTABLE - 01/01/2022 05:54 AM EST Findings: There is a question of mild interstitial opacities within the bilateral lower lungs. Interval improvement since the prior study. No consolidation or pleural effusion. Normal size heart. No acute fracture. Lines and tubes have been removed. Possible mild interstitial infiltrates. Interval improvement in aeration since the prior study. This document has been electronically signed by: Dewey Wagner MD on 03/18/2022 07:47 PM    FEN/GI/DVT:  IVF: None  Electrolytes: Monitor and replace per protocols  Diet: NPO after midnight  GI PPX: On PPI for GERD  DVT Prophylaxis: SCDs    CODE STATUS:  Full    Thank you Hayder Gongora for the opportunity to be involved in this patient's care.     Electronically signed by Adam Ang DO on 3/18/2022 at 10:21 PM

## 2022-03-19 NOTE — PLAN OF CARE
Problem: Falls - Risk of:  Goal: Will remain free from falls  Description: Will remain free from falls  3/19/2022 1434 by Lara Billings  Outcome: Met This Shift   Patient remains free from falls. Bed alarm on and call light. Personal belongings in reach. Problem: Pain:  Goal: Pain level will decrease  Description: Pain level will decrease  Outcome: Ongoing   Patients pain goal is 3 and goal is met. Prn pain medications as ordered. Problem: Discharge Planning:  Goal: Participates in care planning  Description: Participates in care planning  Outcome: Ongoing   Patient plans to go home with  when stable. Problem: Airway Clearance - Ineffective:  Goal: Ability to maintain a clear airway will improve  Description: Ability to maintain a clear airway will improve  Outcome: Ongoing   Patient maintains clear, open airway. Patient remains on oxygen without stridor. Problem: Gas Exchange - Impaired:  Goal: Levels of oxygenation will improve  Description: Levels of oxygenation will improve  Outcome: Ongoing  O2 saturation greater than 90%. Tolerating HFNC. Problem: Serum Glucose Level - Abnormal:  Goal: Ability to maintain appropriate glucose levels will improve to within specified parameters  Description: Ability to maintain appropriate glucose levels will improve to within specified parameters  Outcome: Ongoing  Monitoring CHEM achs. SSI as ordered. Problem: Skin Integrity - Impaired:  Goal: Absence of new skin breakdown  Description: Absence of new skin breakdown  Outcome: Ongoing   Patient shows no sign of new skin breakdown. Q2 turns in place. Care plan reviewed with patient. Patient verbalizes understanding plan of care and contributes to goal settings.

## 2022-03-19 NOTE — ED NOTES
Pt's breathing better at this time, pt more relaxed, breathing less labored.        Jun Guzman RN  03/18/22 2045

## 2022-03-20 LAB
ANION GAP SERPL CALCULATED.3IONS-SCNC: 12 MEQ/L (ref 8–16)
BUN BLDV-MCNC: 21 MG/DL (ref 7–22)
CALCIUM SERPL-MCNC: 9.8 MG/DL (ref 8.5–10.5)
CHLORIDE BLD-SCNC: 97 MEQ/L (ref 98–111)
CO2: 29 MEQ/L (ref 23–33)
CREAT SERPL-MCNC: 0.5 MG/DL (ref 0.4–1.2)
ERYTHROCYTE [DISTWIDTH] IN BLOOD BY AUTOMATED COUNT: 12.7 % (ref 11.5–14.5)
ERYTHROCYTE [DISTWIDTH] IN BLOOD BY AUTOMATED COUNT: 42.1 FL (ref 35–45)
GFR SERPL CREATININE-BSD FRML MDRD: > 90 ML/MIN/1.73M2
GLUCOSE BLD-MCNC: 249 MG/DL (ref 70–108)
GLUCOSE BLD-MCNC: 295 MG/DL (ref 70–108)
GLUCOSE BLD-MCNC: 323 MG/DL (ref 70–108)
GLUCOSE BLD-MCNC: 329 MG/DL (ref 70–108)
GLUCOSE BLD-MCNC: 374 MG/DL (ref 70–108)
HCT VFR BLD CALC: 39.3 % (ref 37–47)
HEMOGLOBIN: 12.7 GM/DL (ref 12–16)
MCH RBC QN AUTO: 29.1 PG (ref 26–33)
MCHC RBC AUTO-ENTMCNC: 32.3 GM/DL (ref 32.2–35.5)
MCV RBC AUTO: 90.1 FL (ref 81–99)
PLATELET # BLD: 260 THOU/MM3 (ref 130–400)
PMV BLD AUTO: 10.3 FL (ref 9.4–12.4)
POTASSIUM REFLEX MAGNESIUM: 4.5 MEQ/L (ref 3.5–5.2)
RBC # BLD: 4.36 MILL/MM3 (ref 4.2–5.4)
SODIUM BLD-SCNC: 138 MEQ/L (ref 135–145)
WBC # BLD: 13.2 THOU/MM3 (ref 4.8–10.8)

## 2022-03-20 PROCEDURE — 2580000003 HC RX 258: Performed by: STUDENT IN AN ORGANIZED HEALTH CARE EDUCATION/TRAINING PROGRAM

## 2022-03-20 PROCEDURE — 6370000000 HC RX 637 (ALT 250 FOR IP): Performed by: INTERNAL MEDICINE

## 2022-03-20 PROCEDURE — 82948 REAGENT STRIP/BLOOD GLUCOSE: CPT

## 2022-03-20 PROCEDURE — 6360000002 HC RX W HCPCS: Performed by: STUDENT IN AN ORGANIZED HEALTH CARE EDUCATION/TRAINING PROGRAM

## 2022-03-20 PROCEDURE — 80048 BASIC METABOLIC PNL TOTAL CA: CPT

## 2022-03-20 PROCEDURE — 2700000000 HC OXYGEN THERAPY PER DAY

## 2022-03-20 PROCEDURE — 99233 SBSQ HOSP IP/OBS HIGH 50: CPT | Performed by: INTERNAL MEDICINE

## 2022-03-20 PROCEDURE — 2060000000 HC ICU INTERMEDIATE R&B

## 2022-03-20 PROCEDURE — 94761 N-INVAS EAR/PLS OXIMETRY MLT: CPT

## 2022-03-20 PROCEDURE — 85027 COMPLETE CBC AUTOMATED: CPT

## 2022-03-20 PROCEDURE — 36415 COLL VENOUS BLD VENIPUNCTURE: CPT

## 2022-03-20 PROCEDURE — 6370000000 HC RX 637 (ALT 250 FOR IP): Performed by: STUDENT IN AN ORGANIZED HEALTH CARE EDUCATION/TRAINING PROGRAM

## 2022-03-20 RX ORDER — INSULIN GLARGINE 100 [IU]/ML
12 INJECTION, SOLUTION SUBCUTANEOUS 2 TIMES DAILY
Status: DISCONTINUED | OUTPATIENT
Start: 2022-03-20 | End: 2022-03-23 | Stop reason: HOSPADM

## 2022-03-20 RX ORDER — INSULIN GLARGINE 100 [IU]/ML
12 INJECTION, SOLUTION SUBCUTANEOUS
Status: DISCONTINUED | OUTPATIENT
Start: 2022-03-20 | End: 2022-03-20

## 2022-03-20 RX ADMIN — DOCUSATE SODIUM 100 MG: 100 CAPSULE, LIQUID FILLED ORAL at 20:38

## 2022-03-20 RX ADMIN — INSULIN LISPRO 10 UNITS: 100 INJECTION, SOLUTION INTRAVENOUS; SUBCUTANEOUS at 11:57

## 2022-03-20 RX ADMIN — FUROSEMIDE 40 MG: 40 TABLET ORAL at 08:21

## 2022-03-20 RX ADMIN — ASPIRIN 81 MG CHEWABLE TABLET 81 MG: 81 TABLET CHEWABLE at 08:21

## 2022-03-20 RX ADMIN — SODIUM CHLORIDE, PRESERVATIVE FREE 10 ML: 5 INJECTION INTRAVENOUS at 20:36

## 2022-03-20 RX ADMIN — INSULIN GLARGINE 12 UNITS: 100 INJECTION, SOLUTION SUBCUTANEOUS at 09:52

## 2022-03-20 RX ADMIN — INSULIN LISPRO 5 UNITS: 100 INJECTION, SOLUTION INTRAVENOUS; SUBCUTANEOUS at 17:06

## 2022-03-20 RX ADMIN — Medication 6 MG: at 20:36

## 2022-03-20 RX ADMIN — INSULIN LISPRO 8 UNITS: 100 INJECTION, SOLUTION INTRAVENOUS; SUBCUTANEOUS at 08:21

## 2022-03-20 RX ADMIN — FAMOTIDINE 20 MG: 20 TABLET ORAL at 20:36

## 2022-03-20 RX ADMIN — INSULIN LISPRO 3 UNITS: 100 INJECTION, SOLUTION INTRAVENOUS; SUBCUTANEOUS at 20:35

## 2022-03-20 RX ADMIN — METHYLPREDNISOLONE SODIUM SUCCINATE 40 MG: 40 INJECTION, POWDER, FOR SOLUTION INTRAMUSCULAR; INTRAVENOUS at 20:39

## 2022-03-20 RX ADMIN — FAMOTIDINE 20 MG: 20 TABLET ORAL at 08:21

## 2022-03-20 RX ADMIN — INSULIN GLARGINE 12 UNITS: 100 INJECTION, SOLUTION SUBCUTANEOUS at 20:36

## 2022-03-20 RX ADMIN — SODIUM CHLORIDE, PRESERVATIVE FREE 10 ML: 5 INJECTION INTRAVENOUS at 08:21

## 2022-03-20 RX ADMIN — DOCUSATE SODIUM 100 MG: 100 CAPSULE, LIQUID FILLED ORAL at 08:21

## 2022-03-20 RX ADMIN — INSULIN LISPRO 4 UNITS: 100 INJECTION, SOLUTION INTRAVENOUS; SUBCUTANEOUS at 17:05

## 2022-03-20 RX ADMIN — ROSUVASTATIN CALCIUM 10 MG: 10 TABLET, FILM COATED ORAL at 08:21

## 2022-03-20 ASSESSMENT — PAIN SCALES - GENERAL
PAINLEVEL_OUTOF10: 0

## 2022-03-20 ASSESSMENT — PAIN DESCRIPTION - DESCRIPTORS: DESCRIPTORS: RADIATING

## 2022-03-20 ASSESSMENT — PAIN DESCRIPTION - ORIENTATION: ORIENTATION: RIGHT

## 2022-03-20 ASSESSMENT — PAIN DESCRIPTION - PAIN TYPE: TYPE: ACUTE PAIN

## 2022-03-20 ASSESSMENT — PAIN DESCRIPTION - FREQUENCY: FREQUENCY: INTERMITTENT

## 2022-03-20 ASSESSMENT — PAIN DESCRIPTION - LOCATION: LOCATION: LEG

## 2022-03-20 NOTE — PROGRESS NOTES
Hospitalist Progress Note    Patient:  Mandeep Roberson    YOB: 1950  Unit/Bed:4K-17/017-A  MRN: [id number]    Acct: [de-identified]   PCP: Yohana Brown    Date of Admission: 3/18/2022      Assessment/Plan:    Acute on chronic hypoxic respiratory failure - requires 2 L/min nasal cannula as needed at baseline secondary to recent COVID-19 hospitalization. She is now requiring high flow nasal cannula to maintain adequate oxygen saturations.  -Wean supplemental oxygen to maintain SPO2 greater than 90% as tolerated  -If patient experiences worsening in airway obstruction escalation to BiPAP would be necessary with further escalation to reintubation. She would benefit from PEEP to help maintain patency of the airway.      Posterior glottic stenosis - Secondary intubation from 12/20/21-1/2/22 for COVID-19 infection with stridor. This is evidenced on CTA chest 3/18/22. -ENT is following; hopeful plan for possible suspension microlaryngoscopy with airway dilatation. Patient will be seen by Dr. Francisca Franks on 3/21/2022 for reevaluation. Likely OR Tuesday.   -Continue epi nebs, steroids, reflux prophylaxis  -Maintain elevate head of bed greater than 30 degrees     SIRS - Present on arrival 2/4 (leukocytosis and tachycardia). There is no evidence of infection on physical exam or laboratory review. Leukocytosis is secondary to steroid use outpatient and there is no indication for fluid resuscitation or antibiotic therapy at this time.  -Continue to monitor for signs and symptoms of infection.     Primary hypertension - Currently normotensive  -Continue to monitor for hypertensive events.  -Continue Lasix     Hx of CAD - s/p PCI with bare metal stent to LAD 4/2008 with in stent restenosis treated by cutting balloon and bare metal stent 10/2008. There was 50% stenosis of RCA at that time. - stent placement in 2008.  Was following with Dr. David Bender at [facility].  -Continue daily ASA and statin     Compensated systolic heart failure NYHA III - ECHO on 12/21/2021 demonstrating EF-30-35% with severe global hypokinesis of the left ventricle. Patient does not appear to be on goal-directed medical therapy. - Continue Lasix  - Recommend follow-up with cardiology outpatient for GDMT evaluation  - Repeat limited echo; with previous low EF there is concern for LifeVest needs at discharge   -pending     NIDDM 2, with steroid induced hyperglycemia - Uncontrolled. A1c =9.9% (12/21/2021). Home regimen includes glipizide.  -Hold home glipizide  -Medium dose sliding scale insulin, hypoglycemic protocol, POCT glucose checks 4 times daily AC/HS   -initiate lantus 12 units this am (will make BID)   -mealtime lispro 5u added on  -Inpatient blood glucose goal 140-180.     Hx of anxiety - noted. - Vistaril as needed     Hx of sciatica - noted.  -Lidocaine patch to the affected area. History of Covid and Critical Illness Myopathy: prior prolonged hospitalization and stay in [facility], aggressive PT/OT to prevent deconditioning. Expected discharge date:  >2d    Disposition: continue to monitor on step down. TBD dispo. [] Home  [] TCU  [] Rehab  [] Psych  [] SNF  [] [facility]  [] Other-    ===================================================================      Chief Complaint: SOB    Hospital Course: Per Prior note hospital course. Genene Epley is a 73 y.o. female with PMHx of CAD S/PE PCI, anxiety, NIDDM 2, hypertension, sciatica, hyperlipidemia, systolic heart failure who presented to [facility] emergency department with complaint of shortness of breath and hoarseness that had progressively worsened since being discharged from rehab on 1/28/2022 where she was recovering from COVID-19 infection resulting in critical illness myopathy.   The patient was discharged home with 2 L oxygen via nasal cannula as needed and she has since been seeing her primary care physician as well as an urgent care center with encouragement to use incentive spirometry, Flovent inhaler, and steroids. She stated that her shortness of breath and hoarseness has gotten to the point where she feels a sensation of syncope but without actual event. She has been monitoring her home oxygen saturation and reports this value dropping into the mid 70s with ambulation on room air.     Within the emergency department chest x-ray, laboratory studies, and CT of the chest only demonstrated mild interstitial infiltrates with peripheral opacities reported as improved from prior CT on 12/18/2021. This is indicative of resolving pneumonia versus interstitial lung disease secondary to COVID-19 infection. Review of the chest x-ray appears concerning for steeple sign which is pathognomonic for tracheal stenoses. ENT was consulted and the patient received steroids, racemic epinephrine, and initiated on high flow oxygen. The patient was subsequently admitted to the hospital service for further care and management. \"    Subjective (past 24 hours): Patient seen at bedside. She is overall relatively stable. She continues to have SOB but improved since steroids started and HF initiated. She has occasional coughing and had some fits overnight where it was difficult to express the sputum. No dizziness, chest pain, fevers or chills. Glucose has been elevated and difficult to control.         Medications:  Reviewed    Infusion Medications    sodium chloride      dextrose       Scheduled Medications    insulin lispro  5 Units SubCUTAneous TID WC    insulin glargine  12 Units SubCUTAneous BID    lidocaine  1 patch TransDERmal Daily    insulin lispro  0-12 Units SubCUTAneous TID WC    insulin lispro  0-6 Units SubCUTAneous Nightly    sodium chloride flush  5-40 mL IntraVENous 2 times per day    aspirin  81 mg Oral Daily    docusate sodium  100 mg Oral BID    famotidine  20 mg Oral BID    furosemide  40 mg Oral Daily    melatonin  6 mg Oral Nightly    rosuvastatin  10 mg Oral Daily    methylPREDNISolone  40 mg IntraVENous Daily     PRN Meds: sodium chloride flush, sodium chloride, ondansetron **OR** ondansetron, polyethylene glycol, acetaminophen **OR** acetaminophen, potassium chloride **OR** potassium alternative oral replacement **OR** potassium chloride, magnesium sulfate, albuterol sulfate HFA, hydrOXYzine, senna, glucagon (rDNA), dextrose, racepinephrine HCl, sodium chloride nebulizer, albuterol, dextrose bolus (hypoglycemia), glucose      ROS: reviewed from prior note, full ROS unchanged unless otherwise stated in hospital course/subjective portion. Intake/Output Summary (Last 24 hours) at 3/20/2022 1426  Last data filed at 3/20/2022 0900  Gross per 24 hour   Intake 380 ml   Output 1350 ml   Net -970 ml       Exam:  /80   Pulse 100   Temp 98.2 °F (36.8 °C) (Oral)   Resp 18   Ht 5' 2\" (1.575 m)   Wt 196 lb (88.9 kg)   LMP  (LMP Unknown)   SpO2 100%   BMI 35.85 kg/m²     General appearance: well developed, appears stated age. Overweight. Eyes:  PERRL. Conjunctivae/corneas clear. HENT: Head normal appearing. Nares normal. Oral mucosa moist.  Hearing intact. Neck: Supple, with full range of motion. Trachea midline. No gross JVD appreciated. Respiratory:  Clear to auscultation, without rales or wheezes or rhonchi. Inspiratory stridor is appreciated. Tight sounding breath sounds noted. Cardiovascular: Normal rate, regular rhythm with normal S1/S2 without murmurs. No lower extremity edema. Abdomen: Soft, non-tender, non-distended with normal bowel sounds. Musculoskeletal: No joint swelling or tenderness. Normal tone. No abnormal movements. Skin: Warm and dry. No rashes or lesions. Neurologic:  No focal sensory/motor deficits in the upper or lower extremities. Cranial nerves:  grossly non-focal 2-12. Psychiatric: Alert and oriented, normal insight and thought content. Capillary Refill: Brisk,< 3 seconds.   Peripheral Pulses: +2 palpable, equal bilaterally. Labs:   Recent Labs     03/18/22 1922 03/19/22 0436 03/20/22  0430   WBC 14.6* 10.7 13.2*   HGB 13.5 12.9 12.7   HCT 42.4 40.4 39.3    257 260     Recent Labs     03/18/22 1922 03/19/22 0436 03/20/22  0430    137 138   K 4.3 4.5 4.5   CL 97* 98 97*   CO2 31 28 29   BUN 10 13 21   CREATININE 0.5 0.6 0.5   CALCIUM 9.6 9.4 9.8     Recent Labs     03/18/22 1922   AST 16   ALT 9*   BILIDIR <0.2   BILITOT 0.2*   ALKPHOS 101     No results for input(s): INR in the last 72 hours. No results for input(s): Sabina Peek in the last 72 hours. No results for input(s): PROCAL in the last 72 hours. Lab Results   Component Value Date    NITRU NEGATIVE 01/10/2022    WBCUA 15-25 01/10/2022    BACTERIA NONE 01/10/2022    RBCUA > 200 01/10/2022    BLOODU LARGE 01/10/2022    SPECGRAV 1.026 12/31/2021    GLUCOSEU NEGATIVE 01/10/2022       Radiology (48 hours):  CTA Chest W WO Contrast    Result Date: 3/18/2022  Impression: 1. No pulmonary emboli. 2. No thoracic aortic aneurysm or dissection 3. Bilateral peripheral opacities are improved since prior CT of 12/18/2021 and may represent resolving pneumonia versus interstitial lung disease This document has been electronically signed by: Cedric Mary MD on 03/18/2022 09:20 PM All CTs at this facility use dose modulation techniques and iterative reconstructions, and/or weight-based dosing when appropriate to reduce radiation to a low as reasonably achievable. XR CHEST PORTABLE    Result Date: 3/18/2022  Possible mild interstitial infiltrates. Interval improvement in aeration since the prior study. This document has been electronically signed by: Angeles Alejo MD on 03/18/2022 07:47 PM       DVT prophylaxis:    [] Lovenox  [x] SCDs  [] SQ Heparin  [] Encourage ambulation   [] Already on Anticoagulation       Diet: ADULT DIET;  Regular; 4 carb choices (60 gm/meal)  Code Status: Full Code  PT/OT: lesia  Tele: yes  IVF: n/a    Electronically signed by Alen Nazario DO on 3/20/2022 at 2:26 PM

## 2022-03-20 NOTE — PLAN OF CARE
Problem: Falls - Risk of:  Goal: Will remain free from falls  Description: Will remain free from falls  Outcome: Met This Shift  Goal: Absence of physical injury  Description: Absence of physical injury  Outcome: Met This Shift     Problem: Pain:  Goal: Pain level will decrease  Description: Pain level will decrease  Outcome: Met This Shift  Goal: Control of acute pain  Description: Control of acute pain  Outcome: Met This Shift  Goal: Control of chronic pain  Description: Control of chronic pain  Outcome: Met This Shift     Problem: Skin Integrity - Impaired:  Goal: Will show no infection signs and symptoms  Description: Will show no infection signs and symptoms  Outcome: Met This Shift  Goal: Absence of new skin breakdown  Description: Absence of new skin breakdown  Outcome: Met This Shift     Problem: Discharge Planning:  Goal: Participates in care planning  Description: Participates in care planning  Outcome: Ongoing  Goal: Discharged to appropriate level of care  Description: Discharged to appropriate level of care  Outcome: Ongoing     Problem: Airway Clearance - Ineffective:  Goal: Ability to maintain a clear airway will improve  Description: Ability to maintain a clear airway will improve  Outcome: Ongoing     Problem: Gas Exchange - Impaired:  Goal: Levels of oxygenation will improve  Description: Levels of oxygenation will improve  Outcome: Ongoing     Problem: Serum Glucose Level - Abnormal:  Goal: Ability to maintain appropriate glucose levels will improve to within specified parameters  Description: Ability to maintain appropriate glucose levels will improve to within specified parameters  Outcome: Ongoing

## 2022-03-20 NOTE — PROGRESS NOTES
Department of Otolaryngology  Consult follow up Note      Subjective: NAEO. Breathing has improved. She is having trouble with her diabetic diet. Initial HPI:               The patient is a 73 y.o. female who was admitted to [facility] on 3/18 for difficulty breathing. She had Covid at the end of December, for time due to that she was extubated in January, and has been at home 2 L of oxygen. After being extubated, she had a hoarse voice. During the past 6 weeks as she has been at home, her breathing has gotten significantly worse. Her stridor has become more audible, and she can no longer tolerate anytime at home off the oxygen. She was admitted from the emergency room last night, given steroids, lasix, famotidine, racemic epi neb, and albuterol. She states that her breathing feels much better than it did last night. Also she thinks her voice is better now than it has been since her discharge in January. PHYSICAL EXAM:    VITALS:  /70   Pulse 96   Temp 97.5 °F (36.4 °C) (Oral)   Resp 18   Ht 5' 2\" (1.575 m)   Wt 196 lb (88.9 kg)   LMP  (LMP Unknown)   SpO2 99%   BMI 35.85 kg/m²     ABNORMAL OTOLARYNGOLOGIC EXAM FINDINGS: Obese. Audible airway turbulence with inspiration and expiration, slightly improved from yesterday. No retractions. She is able to talk in complete sentences, with Voice: grade 2, roughness 2, breathiness 0, asthenia 2, strain 1. IMPRESSION/RECOMMENDATIONS:      73-year-old female with posterior glottic stenosis from prolonged intubation she is currently stable on 2 L high flow nasal cannula. She does have significant posterior glottic stenosis, with approximately 2 mm airway. I do think she would benefit from suspension microlaryngoscopy with airway dilation. # Posterior glottic stenosis  - Case scheduled for Tuesday at 12:00. Please make NPO Monday at midnight.       Continue Epi nebs, steroids, reflux ppx, HOB elevation, HFNC.   Please page me if breathing worsens. BiPAP would be next step. Remainder of care per primary.        Electronically signed by Katie Interiano MD on 3/20/2022 at 10:42 AM

## 2022-03-21 LAB
ANION GAP SERPL CALCULATED.3IONS-SCNC: 10 MEQ/L (ref 8–16)
BUN BLDV-MCNC: 30 MG/DL (ref 7–22)
CALCIUM SERPL-MCNC: 9.5 MG/DL (ref 8.5–10.5)
CHLORIDE BLD-SCNC: 99 MEQ/L (ref 98–111)
CO2: 27 MEQ/L (ref 23–33)
CREAT SERPL-MCNC: 0.9 MG/DL (ref 0.4–1.2)
ERYTHROCYTE [DISTWIDTH] IN BLOOD BY AUTOMATED COUNT: 12.8 % (ref 11.5–14.5)
ERYTHROCYTE [DISTWIDTH] IN BLOOD BY AUTOMATED COUNT: 43.2 FL (ref 35–45)
GFR SERPL CREATININE-BSD FRML MDRD: 62 ML/MIN/1.73M2
GLUCOSE BLD-MCNC: 145 MG/DL (ref 70–108)
GLUCOSE BLD-MCNC: 197 MG/DL (ref 70–108)
GLUCOSE BLD-MCNC: 332 MG/DL (ref 70–108)
GLUCOSE BLD-MCNC: 338 MG/DL (ref 70–108)
GLUCOSE BLD-MCNC: 373 MG/DL (ref 70–108)
HCT VFR BLD CALC: 39.4 % (ref 37–47)
HEMOGLOBIN: 12.5 GM/DL (ref 12–16)
MCH RBC QN AUTO: 29.3 PG (ref 26–33)
MCHC RBC AUTO-ENTMCNC: 31.7 GM/DL (ref 32.2–35.5)
MCV RBC AUTO: 92.5 FL (ref 81–99)
PLATELET # BLD: 238 THOU/MM3 (ref 130–400)
PMV BLD AUTO: 10.6 FL (ref 9.4–12.4)
POTASSIUM REFLEX MAGNESIUM: 5.2 MEQ/L (ref 3.5–5.2)
RBC # BLD: 4.26 MILL/MM3 (ref 4.2–5.4)
SODIUM BLD-SCNC: 136 MEQ/L (ref 135–145)
WBC # BLD: 12 THOU/MM3 (ref 4.8–10.8)

## 2022-03-21 PROCEDURE — 80048 BASIC METABOLIC PNL TOTAL CA: CPT

## 2022-03-21 PROCEDURE — 6360000002 HC RX W HCPCS: Performed by: STUDENT IN AN ORGANIZED HEALTH CARE EDUCATION/TRAINING PROGRAM

## 2022-03-21 PROCEDURE — 2580000003 HC RX 258: Performed by: STUDENT IN AN ORGANIZED HEALTH CARE EDUCATION/TRAINING PROGRAM

## 2022-03-21 PROCEDURE — 6370000000 HC RX 637 (ALT 250 FOR IP): Performed by: INTERNAL MEDICINE

## 2022-03-21 PROCEDURE — 85027 COMPLETE CBC AUTOMATED: CPT

## 2022-03-21 PROCEDURE — 97535 SELF CARE MNGMENT TRAINING: CPT

## 2022-03-21 PROCEDURE — 99233 SBSQ HOSP IP/OBS HIGH 50: CPT | Performed by: INTERNAL MEDICINE

## 2022-03-21 PROCEDURE — 2060000000 HC ICU INTERMEDIATE R&B

## 2022-03-21 PROCEDURE — 82948 REAGENT STRIP/BLOOD GLUCOSE: CPT

## 2022-03-21 PROCEDURE — 99232 SBSQ HOSP IP/OBS MODERATE 35: CPT | Performed by: PHYSICIAN ASSISTANT

## 2022-03-21 PROCEDURE — 97530 THERAPEUTIC ACTIVITIES: CPT

## 2022-03-21 PROCEDURE — 93307 TTE W/O DOPPLER COMPLETE: CPT

## 2022-03-21 PROCEDURE — 94761 N-INVAS EAR/PLS OXIMETRY MLT: CPT

## 2022-03-21 PROCEDURE — 6370000000 HC RX 637 (ALT 250 FOR IP): Performed by: STUDENT IN AN ORGANIZED HEALTH CARE EDUCATION/TRAINING PROGRAM

## 2022-03-21 PROCEDURE — 36415 COLL VENOUS BLD VENIPUNCTURE: CPT

## 2022-03-21 PROCEDURE — 2700000000 HC OXYGEN THERAPY PER DAY

## 2022-03-21 PROCEDURE — 97166 OT EVAL MOD COMPLEX 45 MIN: CPT

## 2022-03-21 RX ORDER — INSULIN GLARGINE 100 [IU]/ML
24 INJECTION, SOLUTION SUBCUTANEOUS ONCE
Status: COMPLETED | OUTPATIENT
Start: 2022-03-21 | End: 2022-03-21

## 2022-03-21 RX ADMIN — DOCUSATE SODIUM 100 MG: 100 CAPSULE, LIQUID FILLED ORAL at 20:53

## 2022-03-21 RX ADMIN — INSULIN LISPRO 5 UNITS: 100 INJECTION, SOLUTION INTRAVENOUS; SUBCUTANEOUS at 17:11

## 2022-03-21 RX ADMIN — INSULIN LISPRO 5 UNITS: 100 INJECTION, SOLUTION INTRAVENOUS; SUBCUTANEOUS at 12:10

## 2022-03-21 RX ADMIN — FAMOTIDINE 20 MG: 20 TABLET ORAL at 20:53

## 2022-03-21 RX ADMIN — SODIUM CHLORIDE, PRESERVATIVE FREE 10 ML: 5 INJECTION INTRAVENOUS at 20:53

## 2022-03-21 RX ADMIN — INSULIN LISPRO 5 UNITS: 100 INJECTION, SOLUTION INTRAVENOUS; SUBCUTANEOUS at 08:10

## 2022-03-21 RX ADMIN — INSULIN GLARGINE 24 UNITS: 100 INJECTION, SOLUTION SUBCUTANEOUS at 20:52

## 2022-03-21 RX ADMIN — FAMOTIDINE 20 MG: 20 TABLET ORAL at 08:09

## 2022-03-21 RX ADMIN — METHYLPREDNISOLONE SODIUM SUCCINATE 40 MG: 40 INJECTION, POWDER, FOR SOLUTION INTRAMUSCULAR; INTRAVENOUS at 20:52

## 2022-03-21 RX ADMIN — INSULIN LISPRO 2 UNITS: 100 INJECTION, SOLUTION INTRAVENOUS; SUBCUTANEOUS at 17:12

## 2022-03-21 RX ADMIN — INSULIN GLARGINE 12 UNITS: 100 INJECTION, SOLUTION SUBCUTANEOUS at 08:10

## 2022-03-21 RX ADMIN — DOCUSATE SODIUM 100 MG: 100 CAPSULE, LIQUID FILLED ORAL at 08:09

## 2022-03-21 RX ADMIN — Medication 6 MG: at 20:53

## 2022-03-21 RX ADMIN — ROSUVASTATIN CALCIUM 10 MG: 10 TABLET, FILM COATED ORAL at 08:09

## 2022-03-21 RX ADMIN — ASPIRIN 81 MG CHEWABLE TABLET 81 MG: 81 TABLET CHEWABLE at 08:09

## 2022-03-21 RX ADMIN — SODIUM CHLORIDE, PRESERVATIVE FREE 10 ML: 5 INJECTION INTRAVENOUS at 08:09

## 2022-03-21 RX ADMIN — INSULIN LISPRO 8 UNITS: 100 INJECTION, SOLUTION INTRAVENOUS; SUBCUTANEOUS at 08:12

## 2022-03-21 RX ADMIN — INSULIN LISPRO 8 UNITS: 100 INJECTION, SOLUTION INTRAVENOUS; SUBCUTANEOUS at 12:11

## 2022-03-21 RX ADMIN — FUROSEMIDE 40 MG: 40 TABLET ORAL at 08:09

## 2022-03-21 RX ADMIN — INSULIN LISPRO 1 UNITS: 100 INJECTION, SOLUTION INTRAVENOUS; SUBCUTANEOUS at 20:52

## 2022-03-21 ASSESSMENT — PAIN DESCRIPTION - PAIN TYPE: TYPE: CHRONIC PAIN

## 2022-03-21 ASSESSMENT — PAIN DESCRIPTION - ORIENTATION: ORIENTATION: RIGHT;LEFT

## 2022-03-21 ASSESSMENT — PAIN SCALES - GENERAL
PAINLEVEL_OUTOF10: 0
PAINLEVEL_OUTOF10: 3
PAINLEVEL_OUTOF10: 3

## 2022-03-21 ASSESSMENT — PAIN DESCRIPTION - LOCATION: LOCATION: BACK;FOOT

## 2022-03-21 ASSESSMENT — PAIN DESCRIPTION - DESCRIPTORS: DESCRIPTORS: PINS AND NEEDLES

## 2022-03-21 NOTE — PROGRESS NOTES
Hospitalist Progress Note    Patient: Tone Vera  Unit/Bed: 2E-26/327-W  YOB: 1950  MRN: [id number]  Acct: [de-identified]    PCP: Jesse Jasso    Date of Admission: 3/18/2022    Assessment/Plan:    Acute on chronic hypoxic respiratory failure - requires 2 L/min nasal cannula as needed at baseline secondary to recent COVID-19 hospitalization. She is now requiring high flow nasal cannula to maintain adequate oxygen saturations.  -Wean supplemental oxygen to maintain SPO2 greater than 90% as tolerated  -If patient experiences worsening in airway obstruction escalation to BiPAP would be necessary with further escalation to reintubation. Posterior glottic stenosis - Secondary intubation from 12/20/21-1/2/22 for COVID-19 infection with current symptoms of stridor. This is evidenced on CTA chest 3/18/22.  - ENT is following; hopeful plan for possible suspension microlaryngoscopy with airway dilatation. Patient will be seen by Dr. Chanda Yanez on 3/21/2022 for reevaluation.  - Plan for possible OR on 3/22/22  - Continue epi nebs, steroids, reflux prophylaxis  - Maintain elevate head of bed greater than 30 degrees    SIRS - Present on arrival 2/4 (leukocytosis and tachycardia). There is no evidence of infection on physical exam or laboratory review. Leukocytosis is secondary to steroid use outpatient and there is no indication for fluid resuscitation or antibiotic therapy at this time. - Continue to monitor for signs and symptoms of infection. Primary hypertension - Currently normotensive  - Continue to monitor for hypertensive events. - Continue Lasix    Hx of CAD - s/p PCI with bare metal stent to LAD 4/2008 with in stent restenosis treated by cutting balloon and bare metal stent 10/2008. There was 50% stenosis of RCA at that time. - stent placement in 2008.  Was following with Dr. Ashu Resendez at [facility].  - Continue daily ASA and statin    Compensated systolic heart failure NYHA III - ECHO on 12/21/2021 demonstrating EF-30-35% with severe global hypokinesis of the left ventricle. Patient does not appear to be on goal-directed medical therapy. - Continue Lasix  - Recommend follow-up with cardiology outpatient for GDMT evaluation  - Limited ECHO completed and results are pending; with previous low EF there is concern for LifeVest needs at discharge    NIDDM 2 - Uncontrolled. A1c =9.9% (12/21/2021). Home regimen includes glipizide.  -Hold home glipizide  - Lantus 12u twice daily, mealtime lispro 5u, medium dose sliding scale insulin, hypoglycemic protocol, POCT glucose checks 4 times daily AC/HS  - Inpatient blood glucose goal 140-180. Hx of anxiety - noted. - Vistaril as needed    Hx of sciatica - noted. - Lidocaine patch to the affected area. Hx of COVID infection and critical illness myopathy - Prolonged hospitalization and long stay in [facility].  - PT/OT        Expected discharge date:  TBD    Disposition:    [x] Home       [] TCU       [] Rehab       [] Psych       [] SNF       [] [place]       [] Other -    Chief Complaint: shortness of breath and hoarseness    Hospital Course:  Mandeep Roberson is a 73 y.o. female with PMHx of CAD S/PE PCI, anxiety, NIDDM 2, hypertension, sciatica, hyperlipidemia, systolic heart failure who presented to [facility] emergency department with complaint of shortness of breath and hoarseness that had progressively worsened since being discharged from rehab on 1/28/2022 where she was recovering from COVID-19 infection resulting in critical illness myopathy. The patient was discharged home with 2 L oxygen via nasal cannula as needed and she has since been seeing her primary care physician as well as an urgent care center with encouragement to use incentive spirometry, Flovent inhaler, and steroids. She stated that her shortness of breath and hoarseness has gotten to the point where she feels a sensation of syncope but without actual event.   She has been monitoring her home oxygen saturation and reports this value dropping into the mid 70s with ambulation on room air. Within the emergency department chest x-ray, laboratory studies, and CT of the chest only demonstrated mild interstitial infiltrates with peripheral opacities reported as improved from prior CT on 12/18/2021. This is indicative of resolving pneumonia versus interstitial lung disease secondary to COVID-19 infection. Review of the chest x-ray appears concerning for steeple sign which is pathognomonic for tracheal stenoses. ENT was consulted and the patient received steroids, racemic epinephrine, and initiated on high flow oxygen. The patient was subsequently admitted to the hospital service for further care and management. Subjective (past 24 hours): Per nursing, no acute events overnight. The patient stated that she is doing well and has no concerns or complaints. The lidocaine patch for her sciatic pain has been doing well. Patient is planned for surgery with ENT tomorrow. Review of Systems: 12 point review of systems completed and pertinent positives are noted in the HPI. All other systems reviewed and negative.     Medications:  Reviewed    Infusion Medications    sodium chloride      dextrose       Scheduled Medications    insulin lispro  5 Units SubCUTAneous TID WC    insulin glargine  12 Units SubCUTAneous BID    lidocaine  1 patch TransDERmal Daily    insulin lispro  0-12 Units SubCUTAneous TID WC    insulin lispro  0-6 Units SubCUTAneous Nightly    sodium chloride flush  5-40 mL IntraVENous 2 times per day    aspirin  81 mg Oral Daily    docusate sodium  100 mg Oral BID    famotidine  20 mg Oral BID    furosemide  40 mg Oral Daily    melatonin  6 mg Oral Nightly    rosuvastatin  10 mg Oral Daily    methylPREDNISolone  40 mg IntraVENous Daily     PRN Meds: sodium chloride flush, sodium chloride, ondansetron **OR** ondansetron, polyethylene glycol, acetaminophen **OR** acetaminophen, potassium chloride **OR** potassium alternative oral replacement **OR** potassium chloride, magnesium sulfate, albuterol sulfate HFA, hydrOXYzine, senna, glucagon (rDNA), dextrose, racepinephrine HCl, sodium chloride nebulizer, albuterol, dextrose bolus (hypoglycemia), glucose      Intake/Output Summary (Last 24 hours) at 3/21/2022 0818  Last data filed at 3/21/2022 0809  Gross per 24 hour   Intake 1060 ml   Output 1350 ml   Net -290 ml       Diet:  ADULT DIET; Regular; 4 carb choices (60 gm/meal)    Exam:  /89   Pulse 98   Temp 98.2 °F (36.8 °C) (Oral)   Resp 18   Ht 5' 2\" (1.575 m)   Wt 196 lb (88.9 kg)   LMP  (LMP Unknown)   SpO2 99%   BMI 35.85 kg/m²     Physical Exam  Vitals and nursing note reviewed. Constitutional:       General: She is awake. Appearance: Normal appearance. She is obese. Interventions: Nasal cannula in place. HENT:      Head: Normocephalic and atraumatic. Right Ear: External ear normal.      Left Ear: External ear normal.      Nose: Nose normal.      Mouth/Throat:      Mouth: Mucous membranes are moist.      Pharynx: Oropharynx is clear. Eyes:      Conjunctiva/sclera: Conjunctivae normal.      Pupils: Pupils are equal, round, and reactive to light. Cardiovascular:      Rate and Rhythm: Normal rate and regular rhythm. Pulses: Normal pulses. Dorsalis pedis pulses are 2+ on the right side and 2+ on the left side. Posterior tibial pulses are 2+ on the right side and 2+ on the left side. Heart sounds: Normal heart sounds. Pulmonary:      Effort: Pulmonary effort is normal. No accessory muscle usage. Breath sounds: Stridor and transmitted upper airway sounds present. Decreased breath sounds present. Abdominal:      General: Bowel sounds are normal.      Palpations: Abdomen is soft. Musculoskeletal:         General: Normal range of motion. Cervical back: Normal range of motion and neck supple.       Right lower le+ Pitting Edema present. Left lower le+ Pitting Edema present. Skin:     General: Skin is warm and dry. Capillary Refill: Capillary refill takes less than 2 seconds. Neurological:      General: No focal deficit present. Mental Status: She is alert and oriented to person, place, and time. Mental status is at baseline. GCS: GCS eye subscore is 4. GCS verbal subscore is 5. GCS motor subscore is 6. Psychiatric:         Attention and Perception: Attention and perception normal.         Mood and Affect: Mood and affect normal.         Speech: Speech normal.         Behavior: Behavior normal. Behavior is cooperative. Thought Content: Thought content normal.         Cognition and Memory: Cognition and memory normal.         Judgment: Judgment normal.         Labs:  Recent Labs     22  0436 22  0430 22  0515   WBC 10.7 13.2* 12.0*   HGB 12.9 12.7 12.5   HCT 40.4 39.3 39.4    260 238     Recent Labs     22  0436 22  0430 22  0515    138 136   K 4.5 4.5 5.2   CL 98 97* 99   CO2 28 29 27   BUN 13 21 30*   CREATININE 0.6 0.5 0.9   CALCIUM 9.4 9.8 9.5     Recent Labs     22  1922   AST 16   ALT 9*   BILIDIR <0.2   BILITOT 0.2*   ALKPHOS 101     No results for input(s): INR in the last 72 hours. No results for input(s): Roseanne Mcfarlaneier in the last 72 hours. Microbiology:    Urinalysis:   Lab Results   Component Value Date    NITRU NEGATIVE 01/10/2022    WBCUA 15-25 01/10/2022    BACTERIA NONE 01/10/2022    RBCUA > 200 01/10/2022    BLOODU LARGE 01/10/2022    SPECGRAV 1.026 2021    GLUCOSEU NEGATIVE 01/10/2022       Radiology:  CTA Chest W WO Contrast   Final Result   Impression:   1. No pulmonary emboli. 2. No thoracic aortic aneurysm or dissection   3.  Bilateral peripheral opacities are improved since prior CT of    2021 and may represent resolving pneumonia versus interstitial lung    disease      This document has been electronically signed by: Zoie Vickers MD on    03/18/2022 09:20 PM      All CTs at this facility use dose modulation techniques and iterative    reconstructions, and/or weight-based dosing   when appropriate to reduce radiation to a low as reasonably achievable. XR CHEST PORTABLE   Final Result   Possible mild interstitial infiltrates. Interval improvement in aeration    since the prior study. This document has been electronically signed by: Annalee Jj MD on    03/18/2022 07:47 PM          DVT prophylaxis:  [] Lovenox  [x] SCDs  [] SQ Heparin  [] Encourage ambulation  [] Already on anticoagulation  [] Other -      Code Status: Full Code    PT/OT Evaluation Status: Ordered    Telemetry:  [x] Yes  [] No    Electronically signed by Delvin Verdin DO, MBA on 3/21/2022 at 8:18 AM

## 2022-03-21 NOTE — CARE COORDINATION
3/21/22, 2:40 PM EDT  DISCHARGE PLANNING EVALUATION:    Lani Mandel       Admitted: 3/18/2022/ [facility] day: 3   Location: -17/017-A Reason for admit: Tachycardia [R00.0]  Dyspnea and respiratory abnormalities [R06.00, R06.89]  Inspiratory stridor [R06.1]  Acute respiratory failure with hypoxia (HCC) [J96.01]   PMH:  has a past medical history of Coronary artery disease, COVID, Primary hypertension, and Type 2 diabetes mellitus ([facility] [address] Barriers to Discharge:   Stridor/Posterior Glottic Stenosis. Elevated GLUC/WBC; monitor, ECHO planned today. 28% FIO2/40L HF Oxygen, IV Steroids continued. ENT plans 3/22 Suspension Microlaryngoscopy w Balloon Dilation/Laser Debridement using Jet Ventilation  PCP: Hayder Echeverria  Readmission Risk Score: 19.9 ( )%    Patient Goals/Plan/Treatment Preferences: denied needs as plans home w spouse Vandana Hernandez independently as PTA; therapy following, has [employer] home oxygen 2L as needed; has walker, WC; monitor possible LV needs  Transportation/Food Security/Housekeeping Addressed:  No issues identified.

## 2022-03-21 NOTE — PROGRESS NOTES
Department of Otolaryngology  Progress Note    Chief Complaint:  Shortness of breath    SUBJECTIVE:  The patient reports that she is doing pretty well today. She still feels that her airway is tight, but feels that her breathing is much easier since receiving breathing treatments and starting HFNC. She reports that she has felt her breathing gradually worsening the last 2 weeks or so especially. She states that she was intermittently using oxygen since being hospitalized with COVID-19, but has noticed needing it more and more frequently until the last two weeks she was using it constantly. She has an incentive spirometer at home and states that about 4-6 weeks ago she could get 3,000 on incentive spirometry. However, she has been gradually worsening the last couple of weeks until a few days ago when she was less than 500 reportedly. She denies chest pain, shortness of breath, fevers, chills, nausea, vomiting. No other symptoms or concerns at this time. Initial ENT HPI 3/19/22- The patient is a 73 y.o. female who was admitted to [facility] on 3/18 for difficulty breathing. She had Covid at the end of December, for time due to that she was extubated in January, and has been at home 2 L of oxygen. After being extubated, she had a hoarse voice. During the past 6 weeks as she has been at home, her breathing has gotten significantly worse. Her stridor has become more audible, and she can no longer tolerate anytime at home off the oxygen.     She was admitted from the emergency room last night, given steroids, lasix, famotidine, racemic epi neb, and albuterol. She states that her breathing feels much better than it did last night. Also she thinks her voice is better now than it has been since her discharge in January. REVIEW OF SYSTEMS:    A complete multi-organ review of systems was performed and reviewed by me.   ENT:  negative except as noted in HPI  CONSTITUTIONAL:  negative except as noted in HPI  EYES:  negative except as noted in HPI  RESPIRATORY:  negative except as noted in HPI  CARDIOVASCULAR:  negative except as noted in HPI  GASTROINTESTINAL:  negative except as noted in HPI  GENITOURINARY:  negative except as noted in HPI  MUSCULOSKELETAL:  negative except as noted in HPI  SKIN:  negative except as noted in HPI  ENDOCRINE/METABOLIC: negative except as noted in HPI  HEMATOLOGIC/LYMPHATIC:  negative except as noted in HPI  ALLERGY/IMMUN: negative except as noted in HPI  NEUROLOGICAL:  negative except as noted in HPI  BEHAVIOR/PSYCH:  negative except as noted in HPI    OBJECTIVE      Physical  VITALS:  /76   Pulse 77   Temp 97.9 °F (36.6 °C) (Oral)   Resp 18   Ht 5' 2\" (1.575 m)   Wt 196 lb (88.9 kg)   LMP  (LMP Unknown)   SpO2 100%   BMI 35.85 kg/m²     This is a 73 y.o. female. Patient is alert and oriented to person, place and time. Patient appears well developed, well nourished. Mood is happy with normal affect. Not obviously hearing impaired. Her voice is hoarse. Head:   Normocephalic, atraumatic. No obvious masses or lesions noted. Mouth/Throat:  Lips, tongue and oral cavity: Normal. No masses or lesions noted   Dentition: fair, no malocclusion  Oral mucosa: moist  Oropharynx: normal-appearing mucosa  Hard and soft palates: symmetrical and intact. Salivary glands: not enlarged and no tenderness to palpation. Uvula: midline, no obvious lesions     Neck: Trachea midline. Thyroid not enlarged, no palpable masses or tenderness. Lymphatic: No palpable cervical lymphadenopathy noted. Eyes: ERLIN, EOM intact. Conjunctiva moist without discharge. Lungs: Patient resting in bedside chair while on HFNC on my arrival. Her breathing is unlabored and she displays no evidence of respiratory distress. She does have biphasic stridor, worse with inspiration  Neuro: Cranial nerves II-XII grossly intact.   Extremities: No clubbing, edema, or cyanosis injection 5-40 mL, 5-40 mL, IntraVENous, 2 times per day  sodium chloride flush 0.9 % injection 5-40 mL, 5-40 mL, IntraVENous, PRN  0.9 % sodium chloride infusion, 25 mL, IntraVENous, PRN  ondansetron (ZOFRAN-ODT) disintegrating tablet 4 mg, 4 mg, Oral, Q8H PRN **OR** ondansetron (ZOFRAN) injection 4 mg, 4 mg, IntraVENous, Q6H PRN  polyethylene glycol (GLYCOLAX) packet 17 g, 17 g, Oral, Daily PRN  acetaminophen (TYLENOL) tablet 650 mg, 650 mg, Oral, Q6H PRN **OR** acetaminophen (TYLENOL) suppository 650 mg, 650 mg, Rectal, Q6H PRN  potassium chloride (KLOR-CON M) extended release tablet 40 mEq, 40 mEq, Oral, PRN **OR** potassium bicarb-citric acid (EFFER-K) effervescent tablet 40 mEq, 40 mEq, Oral, PRN **OR** potassium chloride 10 mEq/100 mL IVPB (Peripheral Line), 10 mEq, IntraVENous, PRN  magnesium sulfate 2000 mg in 50 mL IVPB premix, 2,000 mg, IntraVENous, PRN  albuterol sulfate  (90 Base) MCG/ACT inhaler 2 puff, 2 puff, Inhalation, Q6H PRN  aspirin chewable tablet 81 mg, 81 mg, Oral, Daily  docusate sodium (COLACE) capsule 100 mg, 100 mg, Oral, BID  famotidine (PEPCID) tablet 20 mg, 20 mg, Oral, BID  furosemide (LASIX) tablet 40 mg, 40 mg, Oral, Daily  hydrOXYzine (VISTARIL) capsule 25 mg, 25 mg, Oral, 4x Daily PRN  melatonin tablet 6 mg, 6 mg, Oral, Nightly  rosuvastatin (CRESTOR) tablet 10 mg, 10 mg, Oral, Daily  senna (SENOKOT) tablet 8.6 mg, 1 tablet, Oral, BID PRN  glucagon (rDNA) injection 1 mg, 1 mg, IntraMUSCular, PRN  dextrose 5 % solution, 100 mL/hr, IntraVENous, PRN  racepinephrine HCl (VAPONEFPRIN) 2.25 % nebulizer solution NEBU 11.25 mg, 11.25 mg, Nebulization, Q4H PRN  sodium chloride nebulizer 0.9 % solution 3 mL, 3 mL, Nebulization, Q4H PRN  methylPREDNISolone sodium (SOLU-MEDROL) injection 40 mg, 40 mg, IntraVENous, Daily  albuterol (PROVENTIL) nebulizer solution 2.5 mg, 2.5 mg, Nebulization, Q6H PRN  dextrose bolus (hypoglycemia) 10% 125 mL, 125 mL, IntraVENous, PRN  glucose chewable tablet 4 each, 4 tablet, Oral, PRN    ASSESSMENT AND PLAN    Posterior glottic stenosis, acute on chronic hypoxic respiratory failure    -Patient scheduled for suspension microlaryngoscopy with balloon dilation and laser debridement using Jet ventilation on 3/22/22 at 1200 with Dr Alexandre Turner. I discussed the procedure with the patient and she was willing to proceed  -NPO at midnight  -Continue HFNC for comfort. Hopefully will be able to wean off after surgery. If patient were to worsen, likely need BiPAP.  Contact ENT if she were to worsen before surgery  -Continue epi neb, IV steroids, reflux prophylaxis  -Keep [facility] elevated    Electronically signed by MICHAEL Gerber on 3/21/2022 at 2:02 PM

## 2022-03-21 NOTE — PROGRESS NOTES
[facility] ICU STEPDOWN TELEMETRY 4K  EVALUATION    Time:   Time In: 912  Time Out:   Timed Code Treatment Minutes: 26 Minutes  Minutes: 36          Date: 3/21/2022  Patient Name: Hiral Powell,   Gender: female      MRN: [id number]  : 1950  (73 y.o.)  Referring Practitioner: Jordan Angulo DO  Diagnosis: acute respiratory failure with hypoxia  Additional Pertinent Hx: per chart review; Hiral Powell is a 73 y.o. 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 Since her discharge home, she's always has some degree of stridor, but that has gotten worse over the past few days, and who presents with shortness of breath and difficulty breathing, onset was last few days. The duration has been constant. She normally uses 2L of oxygen via NC, but upon arrival required 3L. Restrictions/Precautions:  Restrictions/Precautions: Fall Risk,General Precautions    Subjective  Chart Reviewed: Kenyon Peck and Physical  Patient assessed for rehabilitation services?: Yes  Family / Caregiver Present: No    Subjective: RN approved session. patient supine in bed upon OT arrival and agreeable to eval. patient talked in hoarse voice. A & O x 4. talkative/tangential. patient fearful of choking on mucous she is coughing up and does not want to use incentive without supervision. Pain:  Pain Assessment  Patient Currently in Pain: Yes  Pain Assessment: 0-10  Pain Level: 3  Pain Type: Chronic pain  Pain Location: Back; Foot  Pain Orientation: Right;Left  Pain Descriptors: Pins and needles    Vitals: Oxygen: on HFNC, 40 L, FiO2 28%; did not demo SOB t/o eval    Social/Functional History:  Lives With: Spouse  Type of Home: House  Home Layout: One level (walk in basement)  Home Access: Stairs to enter without rails  Entrance Stairs - Number of Steps: 1  Home Equipment: Rolling walker,Wheelchair-manual   Bathroom Shower/Tub: Walk-in shower  Bathroom Equipment: Shower chair,Grab bars in shower,Toilet raiser,Hand-held shower  Bathroom Accessibility: Accessible       ADL Assistance: Independent (except for assist with BM toilet hygiene)  Homemaking Assistance: Independent  Ambulation Assistance: Independent  Transfer Assistance: Independent       Occupation: Retired  Leisure & Hobbies: patient has a green house. Additional Comments:  is home during day. used no AD prior to admit and used it only after d/c home following COVID hospitalization. VISION:Corrected    HEARING:  WFL    COGNITION: WFL, Tangential and talkative    RANGE OF MOTION:  Right Upper Extremity: WFL  Left Upper Extremity:  WFL    STRENGTH:  Right Upper Extremity: WFL  Left Upper Extremity:  WFL    SENSATION:   WFL    ADL:   Grooming: with set-up. for oral care seated in recliner  Lower Extremity Dressing: Stand By Assistance. seated EOB with encouragement to attempt . BALANCE:  Sitting Balance:  Supervision. seated EOB  Standing Balance: Contact Guard Assistance. with no AD    BED MOBILITY:  Supine to Sit: Modified Independent with use of bedrails. asked head of bed to be elevated to increase ease with getting to EOB; states she sleeps on the couch at home    TRANSFERS:  Sit to Stand:  [employer]. cues for hand placement  Stand Pivot: Contact Guard Assistance. with no AD from EOB to recliner    FUNCTIONAL MOBILITY:  Assistive Device: None  Assist Level:  Contact Guard Assistance. Distance: took a few steps while pivoting from EOB to recliner  Patient may benefit from an AD for energy conservation until O2 needs are decreased. Activity Tolerance:  Patient tolerance of  treatment: good.  Motivated to participate in OT, no c/o SOB or fatigue      Assessment:  Assessment: patient is doing well functionally with encouragement to demo LB dressing and biggest deficit being dependent on HFNC at this time for O2 needs and requries cues for pacing and staying on task t/o eval/treatment. patient would benefit from continued, skilled OT to increase activity tolerance, edu in energy conservation, increase ease and (I) with ADLs and functional transfers to safely transition to prior living environment, decrease caregiver burden and increase occupational performance. Performance deficits / Impairments: Decreased functional mobility ,Decreased ADL status,Decreased endurance  Prognosis: Good  REQUIRES OT FOLLOW UP: Yes  Decision Making: Medium Complexity    Treatment Initiated: Treatment and education initiated within context of evaluation. Evaluation time included review of current medical information, gathering information related to past medical, social and functional history, completion of standardized testing, formal and informal observation of tasks, assessment of data and development of plan of care and goals. Treatment time included skilled education and facilitation of tasks to increase safety and independence with ADL's for improved functional independence and quality of life. Discharge Recommendations:  Continue to assess pending progress,Patient would benefit from continued therapy after discharge    Patient Education:  OT Education: Angelina Ko of [facility]  Floor Training  Patient Education: importance of increasing activity    Equipment Recommendations:  Equipment Needed: No    Plan:  Times per week: 3-5x  Times per day: Daily  Current Treatment Recommendations: Endurance Training,Neuromuscular Re-education,Patient/Caregiver Education & Training,Self-Care / ADL,Safety Education & Training. See long-term goal time frame for expected duration of plan of care. If no long-term goals established, a short length of stay is anticipated.     Goals:  Patient goals : return home at [facility]  Short term goals  Time Frame for Short term goals: by discharge  Short term goal 1: patient will tolerate 5 min functional standing with two hand release with (S) with O2 >/= 90% to increase activity tolerance for self care routine. Short term goal 2: patient will functionally ambulate house hold distances with (S) with 0-1 verbal cues for safety and sequencing. Short term goal 3: patient will be edu in energy conservation tech and recall 4 tech to incorporate into routine to increase ADL success. Short term goal 4: patient will complete ADL routine with (S) and demonstrating energy conservation tech t/o routine. Following session, patient left in safe position with all fall risk precautions in place.

## 2022-03-22 ENCOUNTER — ANESTHESIA EVENT (OUTPATIENT)
Dept: OPERATING ROOM | Age: 72
DRG: 166 | End: 2022-03-22
Payer: MEDICARE

## 2022-03-22 ENCOUNTER — APPOINTMENT (OUTPATIENT)
Dept: GENERAL RADIOLOGY | Age: 72
DRG: 166 | End: 2022-03-22
Payer: MEDICARE

## 2022-03-22 ENCOUNTER — ANESTHESIA (OUTPATIENT)
Dept: OPERATING ROOM | Age: 72
DRG: 166 | End: 2022-03-22
Payer: MEDICARE

## 2022-03-22 VITALS — SYSTOLIC BLOOD PRESSURE: 132 MMHG | TEMPERATURE: 96.8 F | DIASTOLIC BLOOD PRESSURE: 85 MMHG | OXYGEN SATURATION: 99 %

## 2022-03-22 LAB
ANION GAP SERPL CALCULATED.3IONS-SCNC: 12 MEQ/L (ref 8–16)
BUN BLDV-MCNC: 29 MG/DL (ref 7–22)
CALCIUM SERPL-MCNC: 9.5 MG/DL (ref 8.5–10.5)
CHLORIDE BLD-SCNC: 96 MEQ/L (ref 98–111)
CO2: 27 MEQ/L (ref 23–33)
CREAT SERPL-MCNC: 0.6 MG/DL (ref 0.4–1.2)
ERYTHROCYTE [DISTWIDTH] IN BLOOD BY AUTOMATED COUNT: 12.6 % (ref 11.5–14.5)
ERYTHROCYTE [DISTWIDTH] IN BLOOD BY AUTOMATED COUNT: 41.1 FL (ref 35–45)
GFR SERPL CREATININE-BSD FRML MDRD: > 90 ML/MIN/1.73M2
GLUCOSE BLD-MCNC: 197 MG/DL (ref 70–108)
GLUCOSE BLD-MCNC: 211 MG/DL (ref 70–108)
GLUCOSE BLD-MCNC: 246 MG/DL (ref 70–108)
GLUCOSE BLD-MCNC: 278 MG/DL (ref 70–108)
GLUCOSE BLD-MCNC: 302 MG/DL (ref 70–108)
HCT VFR BLD CALC: 39.2 % (ref 37–47)
HEMOGLOBIN: 12.9 GM/DL (ref 12–16)
MCH RBC QN AUTO: 29.1 PG (ref 26–33)
MCHC RBC AUTO-ENTMCNC: 32.9 GM/DL (ref 32.2–35.5)
MCV RBC AUTO: 88.5 FL (ref 81–99)
PLATELET # BLD: 232 THOU/MM3 (ref 130–400)
PMV BLD AUTO: 10.2 FL (ref 9.4–12.4)
POTASSIUM REFLEX MAGNESIUM: 4.8 MEQ/L (ref 3.5–5.2)
RBC # BLD: 4.43 MILL/MM3 (ref 4.2–5.4)
SODIUM BLD-SCNC: 135 MEQ/L (ref 135–145)
WBC # BLD: 8.7 THOU/MM3 (ref 4.8–10.8)

## 2022-03-22 PROCEDURE — 3700000001 HC ADD 15 MINUTES (ANESTHESIA): Performed by: OTOLARYNGOLOGY

## 2022-03-22 PROCEDURE — C1726 CATH, BAL DIL, NON-VASCULAR: HCPCS | Performed by: OTOLARYNGOLOGY

## 2022-03-22 PROCEDURE — 85027 COMPLETE CBC AUTOMATED: CPT

## 2022-03-22 PROCEDURE — 6370000000 HC RX 637 (ALT 250 FOR IP): Performed by: STUDENT IN AN ORGANIZED HEALTH CARE EDUCATION/TRAINING PROGRAM

## 2022-03-22 PROCEDURE — 3600000004 HC SURGERY LEVEL 4 BASE: Performed by: OTOLARYNGOLOGY

## 2022-03-22 PROCEDURE — 94761 N-INVAS EAR/PLS OXIMETRY MLT: CPT

## 2022-03-22 PROCEDURE — 3700000000 HC ANESTHESIA ATTENDED CARE: Performed by: OTOLARYNGOLOGY

## 2022-03-22 PROCEDURE — 7100000001 HC PACU RECOVERY - ADDTL 15 MIN: Performed by: OTOLARYNGOLOGY

## 2022-03-22 PROCEDURE — 6370000000 HC RX 637 (ALT 250 FOR IP): Performed by: OTOLARYNGOLOGY

## 2022-03-22 PROCEDURE — 6360000002 HC RX W HCPCS: Performed by: OTOLARYNGOLOGY

## 2022-03-22 PROCEDURE — 36415 COLL VENOUS BLD VENIPUNCTURE: CPT

## 2022-03-22 PROCEDURE — 6370000000 HC RX 637 (ALT 250 FOR IP)

## 2022-03-22 PROCEDURE — 0C7S8ZZ DILATION OF LARYNX, VIA NATURAL OR ARTIFICIAL OPENING ENDOSCOPIC: ICD-10-PCS | Performed by: OTOLARYNGOLOGY

## 2022-03-22 PROCEDURE — 82948 REAGENT STRIP/BLOOD GLUCOSE: CPT

## 2022-03-22 PROCEDURE — 6360000002 HC RX W HCPCS: Performed by: NURSE ANESTHETIST, CERTIFIED REGISTERED

## 2022-03-22 PROCEDURE — 2700000000 HC OXYGEN THERAPY PER DAY

## 2022-03-22 PROCEDURE — 2500000003 HC RX 250 WO HCPCS: Performed by: NURSE ANESTHETIST, CERTIFIED REGISTERED

## 2022-03-22 PROCEDURE — 3600000014 HC SURGERY LEVEL 4 ADDTL 15MIN: Performed by: OTOLARYNGOLOGY

## 2022-03-22 PROCEDURE — 2060000000 HC ICU INTERMEDIATE R&B

## 2022-03-22 PROCEDURE — 31571 LARYNGOSCOP W/VC INJ + SCOPE: CPT | Performed by: OTOLARYNGOLOGY

## 2022-03-22 PROCEDURE — 6360000002 HC RX W HCPCS: Performed by: STUDENT IN AN ORGANIZED HEALTH CARE EDUCATION/TRAINING PROGRAM

## 2022-03-22 PROCEDURE — 99233 SBSQ HOSP IP/OBS HIGH 50: CPT | Performed by: INTERNAL MEDICINE

## 2022-03-22 PROCEDURE — 71045 X-RAY EXAM CHEST 1 VIEW: CPT

## 2022-03-22 PROCEDURE — 6370000000 HC RX 637 (ALT 250 FOR IP): Performed by: INTERNAL MEDICINE

## 2022-03-22 PROCEDURE — 2580000003 HC RX 258: Performed by: STUDENT IN AN ORGANIZED HEALTH CARE EDUCATION/TRAINING PROGRAM

## 2022-03-22 PROCEDURE — 3E0F33Z INTRODUCTION OF ANTI-INFLAMMATORY INTO RESPIRATORY TRACT, PERCUTANEOUS APPROACH: ICD-10-PCS | Performed by: OTOLARYNGOLOGY

## 2022-03-22 PROCEDURE — 88305 TISSUE EXAM BY PATHOLOGIST: CPT

## 2022-03-22 PROCEDURE — 80048 BASIC METABOLIC PNL TOTAL CA: CPT

## 2022-03-22 PROCEDURE — 7100000000 HC PACU RECOVERY - FIRST 15 MIN: Performed by: OTOLARYNGOLOGY

## 2022-03-22 PROCEDURE — 31528 LARYNGOSCOPY AND DILATION: CPT | Performed by: OTOLARYNGOLOGY

## 2022-03-22 PROCEDURE — 2580000003 HC RX 258: Performed by: NURSE ANESTHETIST, CERTIFIED REGISTERED

## 2022-03-22 PROCEDURE — 2709999900 HC NON-CHARGEABLE SUPPLY: Performed by: OTOLARYNGOLOGY

## 2022-03-22 RX ORDER — PHENYLEPHRINE HYDROCHLORIDE 10 MG/ML
INJECTION INTRAVENOUS PRN
Status: DISCONTINUED | OUTPATIENT
Start: 2022-03-22 | End: 2022-03-22 | Stop reason: SDUPTHER

## 2022-03-22 RX ORDER — FENTANYL CITRATE 50 UG/ML
25 INJECTION, SOLUTION INTRAMUSCULAR; INTRAVENOUS EVERY 5 MIN PRN
Status: DISCONTINUED | OUTPATIENT
Start: 2022-03-22 | End: 2022-03-22 | Stop reason: HOSPADM

## 2022-03-22 RX ORDER — SODIUM CHLORIDE FOR INHALATION 0.9 %
VIAL, NEBULIZER (ML) INHALATION
Status: COMPLETED
Start: 2022-03-22 | End: 2022-03-22

## 2022-03-22 RX ORDER — SODIUM CHLORIDE 9 MG/ML
INJECTION, SOLUTION INTRAVENOUS CONTINUOUS PRN
Status: DISCONTINUED | OUTPATIENT
Start: 2022-03-22 | End: 2022-03-22 | Stop reason: SDUPTHER

## 2022-03-22 RX ORDER — SODIUM CHLORIDE 9 MG/ML
25 INJECTION, SOLUTION INTRAVENOUS PRN
Status: DISCONTINUED | OUTPATIENT
Start: 2022-03-22 | End: 2022-03-22 | Stop reason: HOSPADM

## 2022-03-22 RX ORDER — ONDANSETRON 2 MG/ML
INJECTION INTRAMUSCULAR; INTRAVENOUS PRN
Status: DISCONTINUED | OUTPATIENT
Start: 2022-03-22 | End: 2022-03-22 | Stop reason: SDUPTHER

## 2022-03-22 RX ORDER — PROPOFOL 10 MG/ML
INJECTION, EMULSION INTRAVENOUS PRN
Status: DISCONTINUED | OUTPATIENT
Start: 2022-03-22 | End: 2022-03-22 | Stop reason: SDUPTHER

## 2022-03-22 RX ORDER — FENTANYL CITRATE 50 UG/ML
INJECTION, SOLUTION INTRAMUSCULAR; INTRAVENOUS PRN
Status: DISCONTINUED | OUTPATIENT
Start: 2022-03-22 | End: 2022-03-22 | Stop reason: SDUPTHER

## 2022-03-22 RX ORDER — FENTANYL CITRATE 50 UG/ML
50 INJECTION, SOLUTION INTRAMUSCULAR; INTRAVENOUS EVERY 5 MIN PRN
Status: DISCONTINUED | OUTPATIENT
Start: 2022-03-22 | End: 2022-03-22 | Stop reason: HOSPADM

## 2022-03-22 RX ORDER — OXYCODONE HYDROCHLORIDE 5 MG/1
5 TABLET ORAL EVERY 4 HOURS PRN
Status: DISCONTINUED | OUTPATIENT
Start: 2022-03-22 | End: 2022-03-23 | Stop reason: HOSPADM

## 2022-03-22 RX ORDER — EPINEPHRINE 0.1 MG/ML
SYRINGE (ML) INJECTION PRN
Status: DISCONTINUED | OUTPATIENT
Start: 2022-03-22 | End: 2022-03-22 | Stop reason: ALTCHOICE

## 2022-03-22 RX ORDER — ROCURONIUM BROMIDE 10 MG/ML
INJECTION, SOLUTION INTRAVENOUS PRN
Status: DISCONTINUED | OUTPATIENT
Start: 2022-03-22 | End: 2022-03-22 | Stop reason: SDUPTHER

## 2022-03-22 RX ORDER — SODIUM CHLORIDE FOR INHALATION 0.9 %
3 VIAL, NEBULIZER (ML) INHALATION EVERY 4 HOURS PRN
Status: DISCONTINUED | OUTPATIENT
Start: 2022-03-22 | End: 2022-03-23 | Stop reason: HOSPADM

## 2022-03-22 RX ORDER — PANTOPRAZOLE SODIUM 40 MG/10ML
40 INJECTION, POWDER, LYOPHILIZED, FOR SOLUTION INTRAVENOUS DAILY
Status: DISCONTINUED | OUTPATIENT
Start: 2022-03-22 | End: 2022-03-22

## 2022-03-22 RX ORDER — LIDOCAINE HYDROCHLORIDE 20 MG/ML
INJECTION, SOLUTION INTRAVENOUS PRN
Status: DISCONTINUED | OUTPATIENT
Start: 2022-03-22 | End: 2022-03-22 | Stop reason: SDUPTHER

## 2022-03-22 RX ORDER — TRIAMCINOLONE ACETONIDE 40 MG/ML
INJECTION, SUSPENSION INTRA-ARTICULAR; INTRAMUSCULAR PRN
Status: DISCONTINUED | OUTPATIENT
Start: 2022-03-22 | End: 2022-03-22 | Stop reason: ALTCHOICE

## 2022-03-22 RX ORDER — SODIUM CHLORIDE 0.9 % (FLUSH) 0.9 %
5-40 SYRINGE (ML) INJECTION EVERY 12 HOURS SCHEDULED
Status: DISCONTINUED | OUTPATIENT
Start: 2022-03-22 | End: 2022-03-22 | Stop reason: HOSPADM

## 2022-03-22 RX ORDER — SODIUM CHLORIDE FOR INHALATION 0.9 %
3 VIAL, NEBULIZER (ML) INHALATION ONCE
Status: COMPLETED | OUTPATIENT
Start: 2022-03-22 | End: 2022-03-22

## 2022-03-22 RX ORDER — SODIUM CHLORIDE 0.9 % (FLUSH) 0.9 %
5-40 SYRINGE (ML) INJECTION PRN
Status: DISCONTINUED | OUTPATIENT
Start: 2022-03-22 | End: 2022-03-22 | Stop reason: HOSPADM

## 2022-03-22 RX ORDER — LABETALOL 20 MG/4 ML (5 MG/ML) INTRAVENOUS SYRINGE
PRN
Status: DISCONTINUED | OUTPATIENT
Start: 2022-03-22 | End: 2022-03-22 | Stop reason: SDUPTHER

## 2022-03-22 RX ORDER — PROPOFOL 10 MG/ML
INJECTION, EMULSION INTRAVENOUS CONTINUOUS PRN
Status: DISCONTINUED | OUTPATIENT
Start: 2022-03-22 | End: 2022-03-22 | Stop reason: SDUPTHER

## 2022-03-22 RX ADMIN — PROPOFOL 150 MCG/KG/MIN: 10 INJECTION, EMULSION INTRAVENOUS at 12:43

## 2022-03-22 RX ADMIN — LIDOCAINE HYDROCHLORIDE 80 MG: 20 INJECTION, SOLUTION INTRAVENOUS at 12:33

## 2022-03-22 RX ADMIN — ROCURONIUM BROMIDE 20 MG: 10 INJECTION INTRAVENOUS at 13:38

## 2022-03-22 RX ADMIN — PHENYLEPHRINE HYDROCHLORIDE 100 MCG: 10 INJECTION INTRAVENOUS at 12:42

## 2022-03-22 RX ADMIN — SODIUM CHLORIDE, PRESERVATIVE FREE 10 ML: 5 INJECTION INTRAVENOUS at 20:36

## 2022-03-22 RX ADMIN — RACEPINEPHRINE HYDROCHLORIDE 2.25 MG: 11.25 SOLUTION RESPIRATORY (INHALATION) at 14:35

## 2022-03-22 RX ADMIN — ROSUVASTATIN CALCIUM 10 MG: 10 TABLET, FILM COATED ORAL at 08:36

## 2022-03-22 RX ADMIN — LABETALOL 20 MG/4 ML (5 MG/ML) INTRAVENOUS SYRINGE 10 MG: at 13:24

## 2022-03-22 RX ADMIN — SUGAMMADEX 360 MG: 100 INJECTION, SOLUTION INTRAVENOUS at 14:07

## 2022-03-22 RX ADMIN — DOCUSATE SODIUM 100 MG: 100 CAPSULE, LIQUID FILLED ORAL at 08:35

## 2022-03-22 RX ADMIN — SODIUM CHLORIDE: 9 INJECTION, SOLUTION INTRAVENOUS at 12:24

## 2022-03-22 RX ADMIN — ISODIUM CHLORIDE 3 ML: 0.03 SOLUTION RESPIRATORY (INHALATION) at 14:35

## 2022-03-22 RX ADMIN — ALBUTEROL SULFATE 2.5 MG: 2.5 SOLUTION RESPIRATORY (INHALATION) at 23:59

## 2022-03-22 RX ADMIN — OXYCODONE 5 MG: 5 TABLET ORAL at 23:44

## 2022-03-22 RX ADMIN — SODIUM CHLORIDE, PRESERVATIVE FREE 10 ML: 5 INJECTION INTRAVENOUS at 08:36

## 2022-03-22 RX ADMIN — INSULIN LISPRO 2 UNITS: 100 INJECTION, SOLUTION INTRAVENOUS; SUBCUTANEOUS at 20:36

## 2022-03-22 RX ADMIN — HYDROXYZINE PAMOATE 25 MG: 25 CAPSULE ORAL at 23:44

## 2022-03-22 RX ADMIN — Medication 6 MG: at 20:35

## 2022-03-22 RX ADMIN — PROPOFOL 150 MG: 10 INJECTION, EMULSION INTRAVENOUS at 12:33

## 2022-03-22 RX ADMIN — PROPOFOL 50 MG: 10 INJECTION, EMULSION INTRAVENOUS at 13:20

## 2022-03-22 RX ADMIN — ONDANSETRON HYDROCHLORIDE 4 MG: 4 INJECTION, SOLUTION INTRAMUSCULAR; INTRAVENOUS at 14:06

## 2022-03-22 RX ADMIN — METHYLPREDNISOLONE SODIUM SUCCINATE 40 MG: 40 INJECTION, POWDER, FOR SOLUTION INTRAMUSCULAR; INTRAVENOUS at 20:36

## 2022-03-22 RX ADMIN — INSULIN LISPRO 4 UNITS: 100 INJECTION, SOLUTION INTRAVENOUS; SUBCUTANEOUS at 09:50

## 2022-03-22 RX ADMIN — PHENYLEPHRINE HYDROCHLORIDE 100 MCG: 10 INJECTION INTRAVENOUS at 14:12

## 2022-03-22 RX ADMIN — SODIUM CHLORIDE: 9 INJECTION, SOLUTION INTRAVENOUS at 14:28

## 2022-03-22 RX ADMIN — ROCURONIUM BROMIDE 10 MG: 10 INJECTION INTRAVENOUS at 13:14

## 2022-03-22 RX ADMIN — OXYCODONE 5 MG: 5 TABLET ORAL at 16:42

## 2022-03-22 RX ADMIN — INSULIN LISPRO 5 UNITS: 100 INJECTION, SOLUTION INTRAVENOUS; SUBCUTANEOUS at 17:47

## 2022-03-22 RX ADMIN — Medication 3 ML: at 14:35

## 2022-03-22 RX ADMIN — FENTANYL CITRATE 50 MCG: 50 INJECTION, SOLUTION INTRAMUSCULAR; INTRAVENOUS at 12:26

## 2022-03-22 RX ADMIN — ROCURONIUM BROMIDE 40 MG: 10 INJECTION INTRAVENOUS at 12:44

## 2022-03-22 RX ADMIN — INSULIN LISPRO 2 UNITS: 100 INJECTION, SOLUTION INTRAVENOUS; SUBCUTANEOUS at 17:47

## 2022-03-22 RX ADMIN — FENTANYL CITRATE 50 MCG: 50 INJECTION, SOLUTION INTRAMUSCULAR; INTRAVENOUS at 12:55

## 2022-03-22 RX ADMIN — PHENYLEPHRINE HYDROCHLORIDE 100 MCG: 10 INJECTION INTRAVENOUS at 13:51

## 2022-03-22 RX ADMIN — FAMOTIDINE 20 MG: 20 TABLET ORAL at 08:35

## 2022-03-22 RX ADMIN — ROCURONIUM BROMIDE 20 MG: 10 INJECTION INTRAVENOUS at 13:45

## 2022-03-22 RX ADMIN — FAMOTIDINE 20 MG: 20 TABLET ORAL at 20:36

## 2022-03-22 ASSESSMENT — PULMONARY FUNCTION TESTS
PIF_VALUE: 0
PIF_VALUE: 0
PIF_VALUE: 17
PIF_VALUE: 0
PIF_VALUE: 1
PIF_VALUE: 13
PIF_VALUE: 0
PIF_VALUE: 0
PIF_VALUE: 3
PIF_VALUE: 14
PIF_VALUE: 0
PIF_VALUE: 8
PIF_VALUE: 3
PIF_VALUE: 28
PIF_VALUE: 0
PIF_VALUE: 1
PIF_VALUE: 0
PIF_VALUE: 1
PIF_VALUE: 18
PIF_VALUE: 0
PIF_VALUE: 5
PIF_VALUE: 0
PIF_VALUE: 1
PIF_VALUE: 18
PIF_VALUE: 0
PIF_VALUE: 14
PIF_VALUE: 0
PIF_VALUE: 28
PIF_VALUE: 0
PIF_VALUE: 0
PIF_VALUE: 28
PIF_VALUE: 1
PIF_VALUE: 0
PIF_VALUE: 0
PIF_VALUE: 1
PIF_VALUE: 28
PIF_VALUE: 0
PIF_VALUE: 16
PIF_VALUE: 1
PIF_VALUE: 27
PIF_VALUE: 0
PIF_VALUE: 0
PIF_VALUE: 31
PIF_VALUE: 0
PIF_VALUE: 1
PIF_VALUE: 27
PIF_VALUE: 0
PIF_VALUE: 0
PIF_VALUE: 10
PIF_VALUE: 14
PIF_VALUE: 14
PIF_VALUE: 28
PIF_VALUE: 14
PIF_VALUE: 9
PIF_VALUE: 1
PIF_VALUE: 0
PIF_VALUE: 1
PIF_VALUE: 0
PIF_VALUE: 1
PIF_VALUE: 1
PIF_VALUE: 0
PIF_VALUE: 23
PIF_VALUE: 0
PIF_VALUE: 2
PIF_VALUE: 0
PIF_VALUE: 10
PIF_VALUE: 0
PIF_VALUE: 2
PIF_VALUE: 0
PIF_VALUE: 1
PIF_VALUE: 18
PIF_VALUE: 9
PIF_VALUE: 18
PIF_VALUE: 27
PIF_VALUE: 1
PIF_VALUE: 0
PIF_VALUE: 9
PIF_VALUE: 19
PIF_VALUE: 0
PIF_VALUE: 6
PIF_VALUE: 14
PIF_VALUE: 25
PIF_VALUE: 27
PIF_VALUE: 0
PIF_VALUE: 29
PIF_VALUE: 27
PIF_VALUE: 0
PIF_VALUE: 1
PIF_VALUE: 0

## 2022-03-22 ASSESSMENT — PAIN SCALES - GENERAL
PAINLEVEL_OUTOF10: 5
PAINLEVEL_OUTOF10: 0
PAINLEVEL_OUTOF10: 7
PAINLEVEL_OUTOF10: 10
PAINLEVEL_OUTOF10: 0
PAINLEVEL_OUTOF10: 0

## 2022-03-22 ASSESSMENT — PAIN DESCRIPTION - PAIN TYPE: TYPE: ACUTE PAIN;SURGICAL PAIN

## 2022-03-22 NOTE — H&P
Department of Otolaryngology  Surgery H&P update Note    Day of Surgery Update     She denies any changes to condition or medical history since yesterday. Her breathing has improved, and she was off oxygen some today. Plan: Proceed to OR for planned procedure. All questions answered in pre-op. Initial HPI:               The patient is a 73 y.o. female who was admitted to [address] [place] [address] on 3/18 for difficulty breathing. She had Covid at the end of December, for time due to that she was extubated in January, and has been at home 2 L of oxygen. After being extubated, she had a hoarse voice. During the past 6 weeks as she has been at home, her breathing has gotten significantly worse. Her stridor has become more audible, and she can no longer tolerate anytime at home off the oxygen. She was admitted from the emergency room last night, given steroids, lasix, famotidine, racemic epi neb, and albuterol. She states that her breathing feels much better than it did last night. Also she thinks her voice is better now than it has been since her discharge in January. PHYSICAL EXAM:    VITALS:  BP (!) 149/76   Pulse 68   Temp 97.6 °F (36.4 °C) (Oral)   Resp 20   Ht 5' 2\" (1.575 m)   Wt 190 lb 11.2 oz (86.5 kg)   LMP  (LMP Unknown)   SpO2 95%   BMI 34.88 kg/m²     ABNORMAL OTOLARYNGOLOGIC EXAM FINDINGS: Obese. Audible airway turbulence with inspiration and expiration, slightly improved from yesterday. No retractions. She is able to talk in complete sentences, with Voice: grade 2, roughness 2, breathiness 0, asthenia 2, strain 1. 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 She does have significant posterior glottic stenosis, with approximately 2 mm airway. I do think she would benefit from suspension microlaryngoscopy with airway dilation.

## 2022-03-22 NOTE — PROGRESS NOTES
Hospitalist Progress Note    Patient: David Turk  Unit/Bed: 7P-24/096-D  YOB: 1950  MRN: [id number]  Acct: [de-identified]    PCP: Bar Millan    Date of Admission: 3/18/2022    Assessment/Plan:    Acute on chronic hypoxic respiratory failure - Persisting. Requires 2 L/min nasal cannula as needed at baseline secondary to recent COVID-19 hospitalization. She is now requiring high flow nasal cannula to maintain adequate oxygen saturations and tolerating well. - Wean supplemental oxygen to maintain SPO2 greater than 90% as tolerated  - If patient experiences worsening in airway obstruction escalation to BiPAP would be necessary with further escalation to reintubation. Posterior glottic stenosis - Secondary intubation from 12/20/21-1/2/22 for COVID-19 infection with current symptoms of stridor. This is evidenced on CTA chest 3/18/22.  - ENT is following; Plan for suspension microlaryngoscopy with airway dilatation today (3/22/22). - Continue epi nebs, steroids, reflux prophylaxis  - Maintain elevate head of bed greater than 30 degrees  - NPO    SIRS - Present on arrival 2/4 (leukocytosis and tachycardia). There is no evidence of infection on physical exam or laboratory review. Leukocytosis is secondary to steroid use outpatient and there is no indication for fluid resuscitation or antibiotic therapy at this time. - Continue to monitor for signs and symptoms of infection. Primary hypertension - Currently normotensive  - Continue to monitor for hypertensive events. - Continue Lasix    Hx of CAD - s/p PCI with bare metal stent to LAD 4/2008 with in stent restenosis treated by cutting balloon and bare metal stent 10/2008. There was 50% stenosis of RCA at that time. - stent placement in 2008.  Was following with Dr. Joanie Lopez at [facility] daily ASA and statin    Compensated systolic heart failure NYHA III - ECHO on 12/21/2021 demonstrating EF-30-35% with severe global hypokinesis of the left ventricle. Patient does not appear to be on goal-directed medical therapy. - Continue Lasix  - Recommend follow-up with cardiology outpatient for GDMT evaluation  - Limited ECHO completed and results are pending; with previous low EF there is concern for LifeVest needs at discharge    NIDDM 2 - Uncontrolled. A1c=9.9% (12/21/2021). Home regimen includes glipizide. - Hold home glipizide  - Hold Lantus and mealtime Lispro today for surgery and patient NPO  - Continue medium dose sliding scale insulin, hypoglycemic protocol, POCT glucose checks 4 times daily AC/HS  - Inpatient blood glucose goal 140-180. Hx of anxiety - noted. - Vistaril as needed    Hx of sciatica - noted. - Lidocaine patch to the affected area. Hx of COVID infection and critical illness myopathy - Prolonged hospitalization and long stay in [facility].  - PT/OT        Expected discharge date:  TBD    Disposition:    [x] Home       [] TCU       [] Rehab       [] Psych       [] SNF       [] [place]       [] Other -    Chief Complaint: shortness of breath and hoarseness    Hospital Course:  Stevenson Smith is a 73 y.o. female with PMHx of CAD S/PE PCI, anxiety, NIDDM 2, hypertension, sciatica, hyperlipidemia, systolic heart failure who presented to [facility] emergency department with complaint of shortness of breath and hoarseness that had progressively worsened since being discharged from rehab on 1/28/2022 where she was recovering from COVID-19 infection resulting in critical illness myopathy. The patient was discharged home with 2 L oxygen via nasal cannula as needed and she has since been seeing her primary care physician as well as an urgent care center with encouragement to use incentive spirometry, Flovent inhaler, and steroids. She stated that her shortness of breath and hoarseness has gotten to the point where she feels a sensation of syncope but without actual event.   She has been monitoring her home oxygen saturation and reports this value dropping into the mid 70s with ambulation on room air. Within the emergency department chest x-ray, laboratory studies, and CT of the chest only demonstrated mild interstitial infiltrates with peripheral opacities reported as improved from prior CT on 12/18/2021. This is indicative of resolving pneumonia versus interstitial lung disease secondary to COVID-19 infection. Review of the chest x-ray appears concerning for steeple sign which is pathognomonic for tracheal stenoses. ENT was consulted and the patient received steroids, racemic epinephrine, and initiated on high flow oxygen. The patient was subsequently admitted to the hospital service for further care and management. Subjective (past 24 hours): Per nursing, no acute events overnight. The patient stated that she is feeling well and has no complaints. She was concerned on where she will be go after surgery and what the plan is. She was educated that determination of those factors will need to be discussed post-surgery when we have a more clear picture of the interventions. Review of Systems: 12 point review of systems completed and pertinent positives are noted in the HPI. All other systems reviewed and negative.     Medications:  Reviewed    Infusion Medications    sodium chloride      dextrose       Scheduled Medications    insulin lispro  5 Units SubCUTAneous TID WC    [Held by provider] insulin glargine  12 Units SubCUTAneous BID    lidocaine  1 patch TransDERmal Daily    insulin lispro  0-12 Units SubCUTAneous TID WC    insulin lispro  0-6 Units SubCUTAneous Nightly    sodium chloride flush  5-40 mL IntraVENous 2 times per day    aspirin  81 mg Oral Daily    docusate sodium  100 mg Oral BID    famotidine  20 mg Oral BID    [Held by provider] furosemide  40 mg Oral Daily    melatonin  6 mg Oral Nightly    rosuvastatin  10 mg Oral Daily    methylPREDNISolone  40 mg IntraVENous Daily     PRN Meds: sodium chloride flush, sodium chloride, ondansetron **OR** ondansetron, polyethylene glycol, acetaminophen **OR** acetaminophen, potassium chloride **OR** potassium alternative oral replacement **OR** potassium chloride, magnesium sulfate, albuterol sulfate HFA, hydrOXYzine, senna, glucagon (rDNA), dextrose, racepinephrine HCl, sodium chloride nebulizer, albuterol, dextrose bolus (hypoglycemia), glucose      Intake/Output Summary (Last 24 hours) at 3/22/2022 0729  Last data filed at 3/21/2022 2338  Gross per 24 hour   Intake 700 ml   Output 2300 ml   Net -1600 ml       Diet:  Diet NPO    Exam:  /62   Pulse 66   Temp 97.6 °F (36.4 °C) (Oral)   Resp 20   Ht 5' 2\" (1.575 m)   Wt 190 lb 11.2 oz (86.5 kg)   LMP  (LMP Unknown)   SpO2 95%   BMI 34.88 kg/m²     Physical Exam  Vitals and nursing note reviewed. Constitutional:       General: She is awake. Appearance: Normal appearance. She is obese. Interventions: Nasal cannula in place. HENT:      Head: Normocephalic and atraumatic. Right Ear: External ear normal.      Left Ear: External ear normal.      Nose: Nose normal.      Mouth/Throat:      Mouth: Mucous membranes are moist.      Pharynx: Oropharynx is clear. Eyes:      Conjunctiva/sclera: Conjunctivae normal.      Pupils: Pupils are equal, round, and reactive to light. Cardiovascular:      Rate and Rhythm: Normal rate and regular rhythm. Pulses: Normal pulses. Dorsalis pedis pulses are 2+ on the right side and 2+ on the left side. Posterior tibial pulses are 2+ on the right side and 2+ on the left side. Heart sounds: Normal heart sounds. Pulmonary:      Effort: Pulmonary effort is normal. No accessory muscle usage. Breath sounds: Stridor and transmitted upper airway sounds present. Decreased breath sounds present. Abdominal:      General: Bowel sounds are normal.      Palpations: Abdomen is soft.    Musculoskeletal:         General: Normal range of motion. Cervical back: Normal range of motion and neck supple. Right lower le+ Pitting Edema present. Left lower le+ Pitting Edema present. Skin:     General: Skin is warm and dry. Capillary Refill: Capillary refill takes less than 2 seconds. Neurological:      General: No focal deficit present. Mental Status: She is alert and oriented to person, place, and time. Mental status is at baseline. GCS: GCS eye subscore is 4. GCS verbal subscore is 5. GCS motor subscore is 6. Psychiatric:         Attention and Perception: Attention and perception normal.         Mood and Affect: Mood and affect normal.         Speech: Speech normal.         Behavior: Behavior normal. Behavior is cooperative. Thought Content: Thought content normal.         Cognition and Memory: Cognition and memory normal.         Judgment: Judgment normal.         Labs:  Recent Labs     22  0430 22  0515 22  0453   WBC 13.2* 12.0* 8.7   HGB 12.7 12.5 12.9   HCT 39.3 39.4 39.2    238 232     Recent Labs     22  0430 22  0515 22  0453    136 135   K 4.5 5.2 4.8   CL 97* 99 96*   CO2 29 27 27   BUN 21 30* 29*   CREATININE 0.5 0.9 0.6   CALCIUM 9.8 9.5 9.5     No results for input(s): AST, ALT, BILIDIR, BILITOT, ALKPHOS in the last 72 hours. No results for input(s): INR in the last 72 hours. No results for input(s): Florence Anchors in the last 72 hours. Microbiology:    Urinalysis:   Lab Results   Component Value Date    NITRU NEGATIVE 01/10/2022    WBCUA 15-25 01/10/2022    BACTERIA NONE 01/10/2022    RBCUA > 200 01/10/2022    BLOODU LARGE 01/10/2022    SPECGRAV 1.026 2021    GLUCOSEU NEGATIVE 01/10/2022       Radiology:  CTA Chest W WO Contrast   Final Result   Impression:   1. No pulmonary emboli. 2. No thoracic aortic aneurysm or dissection   3.  Bilateral peripheral opacities are improved since prior CT of    2021 and may represent

## 2022-03-22 NOTE — PLAN OF CARE
Problem: Pain:  Goal: Control of acute pain  Description: Control of acute pain  Outcome: Ongoing     Problem: Airway Clearance - Ineffective:  Goal: Ability to maintain a clear airway will improve  Description: Ability to maintain a clear airway will improve  Outcome: Ongoing

## 2022-03-22 NOTE — PLAN OF CARE
Problem: Gas Exchange - Impaired:  Goal: Levels of oxygenation will improve  Description: Levels of oxygenation will improve  Outcome: Ongoing  Note: Patient currently on HFNC 40L FiO2 28% tolerating well. Will wean as tolerated.

## 2022-03-22 NOTE — ANESTHESIA POSTPROCEDURE EVALUATION
Department of Anesthesiology  Postprocedure Note    Patient: Chau Quach  MRN: [id number]  YOB: 1950  Date of evaluation: 3/22/2022  Time:  2:58 PM     Procedure Summary     Date: 03/22/22 Room / Location: [address] AARTI [facility]    Anesthesia Start: 1224 Anesthesia Stop: 1430    Procedure: SUSPENSON LARYNGOSCOPY WITH LASER, JET VENT, DILATION (N/A ) Diagnosis: (acute respiratory failure)    Surgeons: Jair Thomas MD Responsible Provider: Davonte Lau DO    Anesthesia Type: general, TIVA ASA Status: 3          Anesthesia Type: general, TIVA    Patricia Phase I: Patricia Score: 8    Patricia Phase II:      Last vitals: Reviewed and per EMR flowsheets.        Anesthesia Post Evaluation    Patient location during evaluation: bedside  Patient participation: complete - patient participated  Level of consciousness: awake  Airway patency: patent  Nausea & Vomiting: no vomiting and no nausea  Cardiovascular status: hemodynamically stable  Respiratory status: acceptable  Hydration status: stable

## 2022-03-22 NOTE — FLOWSHEET NOTE
[facility] PROGRESS NOTE      Patient: Jose M Mccullough  Room #: 6F-12/117-X            YOB: 1950  Age: 73 y.o. Gender: female            Admit Date & Time: 3/18/2022  7:07 PM    Assessment:  Aleyda Damon is in bed. She related the story of her long hospitalization of 39 days due to covid. She stated that she has scaring tissue and hopes to be in procedure today to take care of that. Her  is in the room. She is feeling much better with the oxygen she is getting. She is alert and approachable. Interventions:  Prayer for her upcoming procedure. Outcomes:  encouraged    Plan:    1. Care Plan:  Continue spiritual and emotional care for patient and family. Including prayers.      Electronically signed by Elizabeth Ulrich on 3/22/2022 at 11:02 AM.  [facility]  [phone]

## 2022-03-22 NOTE — ANESTHESIA PRE PROCEDURE
Department of Anesthesiology  Preprocedure Note       Name:  David Turk   Age:  73 y.o.  :  1950                                          MRN:  [id number]         Date:  3/22/2022      Surgeon: Asha Pettit):  MD Bryce Grimes MD    Procedure: Procedure(s):  SUSPENSON LARYNGOSCOPY WITH LASER, JET VENT, DILATION    Medications prior to admission:   Prior to Admission medications    Medication Sig Start Date End Date Taking? Authorizing Provider   hydrOXYzine (VISTARIL) 25 MG capsule Take 1 capsule by mouth 4 times daily as needed for Anxiety 22   Aurelio Rubalcava MD   albuterol sulfate  (90 Base) MCG/ACT inhaler Inhale 2 puffs into the lungs every 6 hours as needed for Wheezing 22   Aurelio Rubalcava MD   glipiZIDE (GLUCOTROL) 5 MG tablet Take 1 tablet by mouth 2 times daily (with meals) 22   Aurelio Rubalcava MD   miconazole (MICOTIN) 2 % powder Apply topically 2 times daily.  22   Aurelio Rubalcava MD   diclofenac sodium (VOLTAREN) 1 % GEL Apply 2 g topically 4 times daily as needed for Pain 22   Aurelio Rubalcava MD   furosemide (LASIX) 40 MG tablet Take 1 tablet by mouth daily 22   Aurelio Rubalcava MD   docusate sodium (COLACE) 100 MG capsule Take 1 capsule by mouth 2 times daily 22   Aurelio Rubalcava MD   polycarbophil [facility]) 625 MG tablet Take 1 tablet by mouth daily 22   Aurelio Rubalcava MD   senna (SENOKOT) 8.6 MG tablet Take 1 tablet by mouth 2 times daily as needed (Constipation) 22  Aurelio Rubalcava MD   melatonin 3 MG TABS tablet Take 2 tablets by mouth nightly 22   Aurelio Rubalcava MD   famotidine (PEPCID) 20 MG tablet Take 1 tablet by mouth 2 times daily 22   Aurelio Rubalcava MD   OXYGEN Inhale 2 L/min into the lungs continuous prn (during activities such as walking) 22   Aurelio Rubalcava MD   rosuvastatin (CRESTOR) 10 MG tablet Take 10 mg by mouth daily    Historical Provider, MD   nitroGLYCERIN (EFFER-K) effervescent tablet 40 mEq  40 mEq Oral PRN Rita Si H Le, DO        Or    potassium chloride 10 mEq/100 mL IVPB (Peripheral Line)  10 mEq IntraVENous PRN Rita Si H Le, DO        magnesium sulfate 2000 mg in 50 mL IVPB premix  2,000 mg IntraVENous PRN Rita Si H Le, DO        albuterol sulfate  (90 Base) MCG/ACT inhaler 2 puff  2 puff Inhalation Q6H PRN Rita Si H Le, DO        aspirin chewable tablet 81 mg  81 mg Oral Daily Rita Si H Le, DO   81 mg at 03/21/22 0809    docusate sodium (COLACE) capsule 100 mg  100 mg Oral BID Rita Si H Le, DO   100 mg at 03/22/22 0835    famotidine (PEPCID) tablet 20 mg  20 mg Oral BID Rita Si H Le, DO   20 mg at 03/22/22 0835    [Held by provider] furosemide (LASIX) tablet 40 mg  40 mg Oral Daily Rita Si H Le, DO   40 mg at 03/21/22 0809    hydrOXYzine (VISTARIL) capsule 25 mg  25 mg Oral 4x Daily PRN Rita Si H Le, DO        melatonin tablet 6 mg  6 mg Oral Nightly Rita Si H Le, DO   6 mg at 03/21/22 2053    rosuvastatin (CRESTOR) tablet 10 mg  10 mg Oral Daily Rita Si H Le, DO   10 mg at 03/22/22 0836    senna (SENOKOT) tablet 8.6 mg  1 tablet Oral BID PRN Rita Si H Le, DO        glucagon (rDNA) injection 1 mg  1 mg IntraMUSCular PRN Rita Si H Le, DO        dextrose 5 % solution  100 mL/hr IntraVENous PRN Rita Si H Le, DO        racepinephrine HCl (VAPONEFPRIN) 2.25 % nebulizer solution NEBU 11.25 mg  11.25 mg Nebulization Q4H PRN Rita Si H Le, DO        sodium chloride nebulizer 0.9 % solution 3 mL  3 mL Nebulization Q4H PRN Rita Si H Le, DO        methylPREDNISolone sodium (SOLU-MEDROL) injection 40 mg  40 mg IntraVENous Daily Rita Si H Le, DO   40 mg at 03/21/22 2052    albuterol (PROVENTIL) nebulizer solution 2.5 mg  2.5 mg Nebulization Q6H PRN Rita Si H Le, DO        dextrose bolus (hypoglycemia) 10% 125 mL  125 mL IntraVENous PRN Rita Si H Le, DO        glucose chewable tablet 4 each  4 tablet Oral PRN Rita Si H Le, DO           Allergies:     Allergies   Allergen Reactions    Metformin And Related Other (See Comments)     diarrhea    Codeine Nausea And Vomiting    Meperidine Hcl Nausea And Vomiting    Sulfa Antibiotics Nausea And Vomiting    Sumatriptan Nausea And Vomiting       Problem List:    Patient Active Problem List   Diagnosis Code    COVID-19 U07.1    Hypoxia R09.02    Acute respiratory failure with hypoxia (HCC) J96.01    Debility R53.81    Physical deconditioning R53.81    History of COVID-19 Z86.16    Dysarthria R47.1    Primary hypertension I10    Type 2 diabetes mellitus (HCC) E11.9    Obesity (BMI 30-39. 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       Past Medical History:        Diagnosis Date    Coronary artery disease     COVID     Primary hypertension     Type 2 diabetes mellitus ([facility] [address] 2018       Past Surgical History:        Procedure Laterality Date    CARDIAC SURGERY      CATARACT REMOVAL Bilateral 2020     SECTION      3 times    CORONARY ANGIOPLASTY WITH STENT PLACEMENT      stenting twice    EYE SURGERY      HIATAL HERNIA REPAIR      late     HYSTERECTOMY, TOTAL ABDOMINAL      in [address]      in        Social History:    Social History     Tobacco Use    Smoking status: Never Smoker    Smokeless tobacco: Never Used   Substance Use Topics    Alcohol use: Not Currently     Comment: drinks 1 glass of wine cooler or wine about 3 times per year                                Counseling given: Not Answered      Vital Signs (Current):   Vitals:    22 0320 22 0630 22 0750 22 1141   BP: 121/62  135/78 (!) 149/76   Pulse: 66  78 68   Resp: 18 20 20 20   Temp: 97.6 °F (36.4 °C)  97.3 °F (36.3 °C) 97.6 °F (36.4 °C)   TempSrc: Oral  Oral Oral   SpO2: 100% 95% 96% 95%   Weight:       Height:                                                  BP Readings from Last 3 Encounters:   03/22/22 (!) 149/76   03/09/22 (!) 171/74   01/28/22 130/75       NPO Status:                                                                                 BMI:   Wt Readings from Last 3 Encounters:   03/21/22 190 lb 11.2 oz (86.5 kg)   03/09/22 196 lb (88.9 kg)   01/28/22 189 lb 14.4 oz (86.1 kg)     Body mass index is 34.88 kg/m².     CBC:   Lab Results   Component Value Date    WBC 8.7 03/22/2022    RBC 4.43 03/22/2022    HGB 12.9 03/22/2022    HCT 39.2 03/22/2022    MCV 88.5 03/22/2022     03/22/2022       CMP:   Lab Results   Component Value Date     03/22/2022    K 4.8 03/22/2022    CL 96 03/22/2022    CO2 27 03/22/2022    BUN 29 03/22/2022    CREATININE 0.6 03/22/2022    LABGLOM >90 03/22/2022    GLUCOSE 302 03/22/2022    PROT 7.6 03/18/2022    CALCIUM 9.5 03/22/2022    BILITOT 0.2 03/18/2022    ALKPHOS 101 03/18/2022    AST 16 03/18/2022    ALT 9 03/18/2022       POC Tests:   Recent Labs     03/22/22  0948   POCGLU 246*       Coags: No results found for: PROTIME, INR, APTT    HCG (If Applicable): No results found for: PREGTESTUR, PREGSERUM, HCG, HCGQUANT     ABGs: No results found for: PHART, PO2ART, ZXZ2YAF, OHI2HXD, BEART, X0CTXXKQ     Type & Screen (If Applicable):  No results found for: LABABO, LABRH    Drug/Infectious Status (If Applicable):  No results found for: HIV, HEPCAB    COVID-19 Screening (If Applicable):   Lab Results   Component Value Date    COVID19 detected 12/17/2021           Anesthesia Evaluation  Patient summary reviewed  Airway: Mallampati: II  TM distance: >3 FB   Neck ROM: full  Mouth opening: > = 3 FB Dental:          Pulmonary:   (+) pneumonia:                             Cardiovascular:    (+) hypertension:, CAD:,       ECG reviewed                        Neuro/Psych:   (+) neuromuscular disease:,             GI/Hepatic/Renal:             Endo/Other: (+) Diabetes, . Abdominal:             Vascular: Other Findings:             Anesthesia Plan      general and TIVA     ASA 3     (Jet ventilation)  Induction: intravenous. MIPS: Postoperative opioids intended and Prophylactic antiemetics administered. Anesthetic plan and risks discussed with patient and spouse. Plan discussed with EUGENE. Jimy Yepez.  [facility]   3/22/2022

## 2022-03-22 NOTE — OP NOTE
Otolaryngology-Head and Neck Surgery Operative Note  Surgeon: Steve Tom M.D. Patient: Hira Lujan  YOB: 1950  MRN: [id number]    Date of Procedure: 3/22/2022    Pre-Op Diagnosis: acute respiratory failure    Post-Op Diagnosis: Same       Procedure(s):  SUSPENSON MICROLARYNGOSCOPY WITH RELIEF OF GLOTTIC STENOSIS, AIRWAY BALLOON DILATION, AND LARYNGEAL STEROID INJECTION      Surgeon(s):  MD Steve Menendez MD    Assistant:   * No surgical staff found *    Anesthesia: General anesthesia with jet ventilation    Estimated Blood Loss (mL): Minimal    Complications: None immediate    Specimens:   ID Type Source Tests Collected by Time Destination   A : Right Cricoid Arytenoid ulcer Tissue Neck SURGICAL PATHOLOGY Steve Tom MD 3/22/2022 1316        Implants:  * No implants in log *      Drains: * No LDAs found *     INDICATIONS FOR PROCEDURE: Hira Lujan is a 73 y.o. female with acute respiratory failure. The risks, benefits, complications, treatment options and expected outcomes were discussed with her in detail both in clinic and again today. The possibilities of complication related to the procedure and anethesia were reiterated, and the consent form was updated appropriately. FINDINGS: Grade 1 view with Constantin laryngoscope. There was granulation tissue at the posterior aspect of the right true vocal fold with associated scarring, fixing the right vocal fold in the paramedian position. This extended into the subglottis, medializing the tissue immediately inferior to the true vocal fold. Cup forceps were used to remove the granulation tissue, as well as the laryngeal  microdebrider. The cricoarytenoid joint was exposed and confirmed to be moving. 1 cc of Kenalog was injected into this area. The airway balloon was used to dilate the area up to 15 mm.       OPERATIVE DESCRIPTION:   After informed consent was obtained, the patient was taken from the preoperative holding area to the operating room, and placed in supine position on the operating table where general anesthesia was induced, and an LMA was placed. Once the patient was anesthetized, timeout was called to confirm the correct patient and procedure. The patient positioned in the room and on the bed. She was then prepped and draped in the usual fashion. A mouthguard was placed over her teeth, and a Constantin laryngoscope was then placed inside the mouth and used to visualize the airway. Jet ventilation was then started through the side port of the scope. Once the subglottis was visualized, the scope was suspended. The Conley mike was then placed and inserted to get a better look at the stenosis. At this point up cup forceps were used to remove the granulation tissue fixing the right cricoarytenoid joint. Tissue was removed until the cartilage was visible. At this point we reposition the Constantin laryngoscope, and placed a Prolene stitch through the epiglottis to aid in suspension. The Anette Craze was then reinserted and a check catheter was used for ventilation. The microdebrider with a laryngeal  blade was used to remove excess tissue in this location. The joint was palpated until motion across the joint was clearly visible. Monopolar electrocautery was used to obtain hemostasis in the area, as well as an epinephrine pledget. The right vocal fold was able to be fully lateralized at this point. The airway balloon used up to a size of 15 mm was used to dilate the area of the stenosis. The balloon was left inflated for 1 minute. The balloon was deflated and   removed, and a 30-degree telescope was then used to look past the stenosis down to the level of the telma and bilateral mainstem bronchi, and there were no other stenoses. At this point Kenalog-40 was loaded onto the microlaryngeal injector injected into the posterior glottis on the right-hand side.   A 6-0 MLT endotracheal tube was then inserted and the check catheter was removed. The procedure was then deemed complete, and care was returned to the anesthesia team. The scope was taken off suspension and removed. The dental guard was taken off, and there was no injury to the teeth. They proceeded to transport her to PACU in stable condition. I was present for and performed the patient's entire operation along with Dr. Bernardo Guerra until she was taken to recovery. Disposition: She will be recovered in the PACU and will stay in the hospital for overnight observation with plans to discharge home tomorrow.       Electronically signed by Lee Wagner MD on 3/22/2022 at 12:17 PM

## 2022-03-22 NOTE — PROGRESS NOTES
1426: pt arrives to pacu. Pt awake and alert. Pt having dyspnea at rest and is stridorous. Pt on 10L simple mask. Pt stating she feels like she cant breathe  1430: Dr. Destiney Duenas called   : pt given racemic epi   1445: Dr. Destiney Duenas at bedside and cut stitch   1450: pt resting in bed with eyes closed   1457: xray at bedside  1458: pt meets discharge criteria from pacu  1503: report called to [place]   1524: transport arrives.  Pt transported to [facility]

## 2022-03-22 NOTE — PROGRESS NOTES
[facility]  PHYSICAL THERAPY MISSED TREATMENT NOTE  [facility] OR    Date: 3/22/2022  Patient Name: Sonia Orta        MRN: [id number]   : 1950  (73 y.o.)  Gender: female                REASON FOR MISSED TREATMENT:  Missed Treat. Pt off unit for sx.

## 2022-03-23 VITALS
HEART RATE: 80 BPM | DIASTOLIC BLOOD PRESSURE: 66 MMHG | RESPIRATION RATE: 20 BRPM | TEMPERATURE: 97.8 F | WEIGHT: 190.7 LBS | HEIGHT: 62 IN | OXYGEN SATURATION: 92 % | BODY MASS INDEX: 35.09 KG/M2 | SYSTOLIC BLOOD PRESSURE: 130 MMHG

## 2022-03-23 LAB
ANION GAP SERPL CALCULATED.3IONS-SCNC: 9 MEQ/L (ref 8–16)
BUN BLDV-MCNC: 25 MG/DL (ref 7–22)
CALCIUM SERPL-MCNC: 9 MG/DL (ref 8.5–10.5)
CHLORIDE BLD-SCNC: 100 MEQ/L (ref 98–111)
CO2: 28 MEQ/L (ref 23–33)
CREAT SERPL-MCNC: 0.7 MG/DL (ref 0.4–1.2)
ERYTHROCYTE [DISTWIDTH] IN BLOOD BY AUTOMATED COUNT: 12.5 % (ref 11.5–14.5)
ERYTHROCYTE [DISTWIDTH] IN BLOOD BY AUTOMATED COUNT: 41.3 FL (ref 35–45)
GFR SERPL CREATININE-BSD FRML MDRD: 83 ML/MIN/1.73M2
GLUCOSE BLD-MCNC: 258 MG/DL (ref 70–108)
GLUCOSE BLD-MCNC: 262 MG/DL (ref 70–108)
GLUCOSE BLD-MCNC: 294 MG/DL (ref 70–108)
HCT VFR BLD CALC: 37.7 % (ref 37–47)
HEMOGLOBIN: 12.1 GM/DL (ref 12–16)
MCH RBC QN AUTO: 29.2 PG (ref 26–33)
MCHC RBC AUTO-ENTMCNC: 32.1 GM/DL (ref 32.2–35.5)
MCV RBC AUTO: 90.8 FL (ref 81–99)
PLATELET # BLD: 222 THOU/MM3 (ref 130–400)
PMV BLD AUTO: 10.4 FL (ref 9.4–12.4)
POTASSIUM REFLEX MAGNESIUM: 5 MEQ/L (ref 3.5–5.2)
RBC # BLD: 4.15 MILL/MM3 (ref 4.2–5.4)
SODIUM BLD-SCNC: 137 MEQ/L (ref 135–145)
WBC # BLD: 9.9 THOU/MM3 (ref 4.8–10.8)

## 2022-03-23 PROCEDURE — 6370000000 HC RX 637 (ALT 250 FOR IP): Performed by: STUDENT IN AN ORGANIZED HEALTH CARE EDUCATION/TRAINING PROGRAM

## 2022-03-23 PROCEDURE — 2580000003 HC RX 258: Performed by: STUDENT IN AN ORGANIZED HEALTH CARE EDUCATION/TRAINING PROGRAM

## 2022-03-23 PROCEDURE — 36415 COLL VENOUS BLD VENIPUNCTURE: CPT

## 2022-03-23 PROCEDURE — 6370000000 HC RX 637 (ALT 250 FOR IP): Performed by: INTERNAL MEDICINE

## 2022-03-23 PROCEDURE — 80048 BASIC METABOLIC PNL TOTAL CA: CPT

## 2022-03-23 PROCEDURE — 82948 REAGENT STRIP/BLOOD GLUCOSE: CPT

## 2022-03-23 PROCEDURE — 85027 COMPLETE CBC AUTOMATED: CPT

## 2022-03-23 PROCEDURE — 94761 N-INVAS EAR/PLS OXIMETRY MLT: CPT

## 2022-03-23 PROCEDURE — 99239 HOSP IP/OBS DSCHRG MGMT >30: CPT | Performed by: INTERNAL MEDICINE

## 2022-03-23 PROCEDURE — 2700000000 HC OXYGEN THERAPY PER DAY

## 2022-03-23 PROCEDURE — 94640 AIRWAY INHALATION TREATMENT: CPT

## 2022-03-23 RX ORDER — GLIPIZIDE 5 MG/1
5 TABLET ORAL 2 TIMES DAILY WITH MEALS
Qty: 60 TABLET | Refills: 3 | Status: ON HOLD
Start: 2022-03-23 | End: 2022-04-08 | Stop reason: HOSPADM

## 2022-03-23 RX ORDER — PREDNISONE 10 MG/1
TABLET ORAL
Qty: 20 TABLET | Refills: 0 | Status: ON HOLD | OUTPATIENT
Start: 2022-03-23 | End: 2022-04-07 | Stop reason: HOSPADM

## 2022-03-23 RX ADMIN — INSULIN LISPRO 6 UNITS: 100 INJECTION, SOLUTION INTRAVENOUS; SUBCUTANEOUS at 13:18

## 2022-03-23 RX ADMIN — ROSUVASTATIN CALCIUM 10 MG: 10 TABLET, FILM COATED ORAL at 09:36

## 2022-03-23 RX ADMIN — INSULIN LISPRO 5 UNITS: 100 INJECTION, SOLUTION INTRAVENOUS; SUBCUTANEOUS at 13:19

## 2022-03-23 RX ADMIN — INSULIN LISPRO 6 UNITS: 100 INJECTION, SOLUTION INTRAVENOUS; SUBCUTANEOUS at 09:37

## 2022-03-23 RX ADMIN — SODIUM CHLORIDE, PRESERVATIVE FREE 10 ML: 5 INJECTION INTRAVENOUS at 09:36

## 2022-03-23 RX ADMIN — INSULIN LISPRO 5 UNITS: 100 INJECTION, SOLUTION INTRAVENOUS; SUBCUTANEOUS at 09:38

## 2022-03-23 RX ADMIN — FAMOTIDINE 20 MG: 20 TABLET ORAL at 09:36

## 2022-03-23 RX ADMIN — ASPIRIN 81 MG CHEWABLE TABLET 81 MG: 81 TABLET CHEWABLE at 09:36

## 2022-03-23 ASSESSMENT — PAIN SCALES - GENERAL
PAINLEVEL_OUTOF10: 0
PAINLEVEL_OUTOF10: 0

## 2022-03-23 NOTE — PROGRESS NOTES
CLINICAL PHARMACY: DISCHARGE MED RECONCILIATION/REVIEW    [facility] ([facility]) Select Patient?: No  Total # of Interventions Recommended: 0   -   Total # Interventions Accepted: 0  Intervention Severity:   - Level 1 Intervention Present?: No   - Level 2 #: 0   - Level 3 #: 0   Time Spent (min): 15    Michael Chen, PharmD  3/23/2022 1:50 PM

## 2022-03-23 NOTE — PLAN OF CARE
Problem: RESPIRATORY  Goal: Able to breathe comfortably  Description: Able to breathe comfortably  3/23/2022 0658 by Cass Holland RCP  Outcome: Met This Shift     Problem: Pain:  Goal: Pain level will decrease  Description: Pain level will decrease  3/23/2022 0731 by Gonzalo Henriquez RN  Outcome: Ongoing  3/23/2022 0729 by Gonzalo Henriquez RN  Outcome: Ongoing  3/23/2022 0104 by Gonzalo Henriquez RN  Outcome: Ongoing

## 2022-03-23 NOTE — PROGRESS NOTES
Department of Otolaryngology  Consult follow up Note      Subjective: NAEO. She feels like her breathing is much better now than it was before surgery. She is having no difficulty eating. Initial HPI:               The patient is a 73 y.o. female who was admitted to [address] [place] [address] on 3/18 for difficulty breathing. She had Covid at the end of December, for time due to that she was extubated in January, and has been at home 2 L of oxygen. After being extubated, she had a hoarse voice. During the past 6 weeks as she has been at home, her breathing has gotten significantly worse. Her stridor has become more audible, and she can no longer tolerate anytime at home off the oxygen. She was admitted from the emergency room last night, given steroids, lasix, famotidine, racemic epi neb, and albuterol. She states that her breathing feels much better than it did last night. Also she thinks her voice is better now than it has been since her discharge in January. PHYSICAL EXAM:    VITALS:  /66   Pulse 63   Temp 97.4 °F (36.3 °C) (Oral)   Resp 21   Ht 5' 2\" (1.575 m)   Wt 190 lb 11.2 oz (86.5 kg)   LMP  (LMP Unknown)   SpO2 96%   BMI 34.88 kg/m²     ABNORMAL OTOLARYNGOLOGIC EXAM FINDINGS:     No audible stridor with phonation. She is able to move 1500 on her incentive spirometer without stridor as well. Voice is slightly more breathy than prior to surgery. IMPRESSION/RECOMMENDATIONS:      37-year-old female with cricoarytenoid joint fixation, now postop day 1 from suspension microlaryngoscopy with release of cricoarytenoid joint and airway balloon dilation, with steroid injection. # Posterior glottic stenosis  -She is recovering well from surgery. She reports her breathing is much easier. I would like a walking oxygen saturation trial on room air to be performed prior to discharge today.   She may still require home oxygen, which she has, based on lung function. I would like to see her back in 2 weeks in my office, with repeat surgery likely in 3 weeks. Recommend Prednisone burst and taper, and Tylenol/ibuprofen for pain control upon discharge.         Electronically signed by Kia Castellanos MD on 3/23/2022 at 8:07 AM

## 2022-03-23 NOTE — PROGRESS NOTES
[facility]  PHYSICAL THERAPY MISSED TREATMENT NOTE  [facility] ICU STEPDOWN TELEMETRY 4K    Date: 3/23/2022  Patient Name: Hira Lujan        MRN: [id number]   : 1950  (77 y.o.)  Gender: female                REASON FOR MISSED TREATMENT:  RN reports that pt is to be discharged soon after paperwork is done and IV site removed. Checked with pt and she feels that she is moving fine and does not need PT at this time. Will defer evaluation. Sathish Vargas.  Jenna Irvin, [facility] [place] [address]

## 2022-03-23 NOTE — PROGRESS NOTES
A home oxygen evaluation has been completed. [x]Patient is an inpatient. It is expected that the patient will be discharged within the next 48 hours. Qualified provider to write order for home prescription if patient qualifies. Social service/care managers will arrange for home oxygen. If patient is active, arrange for Home Medical supplier to assess for Oxygen Conserving Device per pulse oximetry. []Patient is an outpatient. Results will be faxed to the ordering provider. Qualified provider to write order for home prescription if patient qualifies and arranges for home oxygen. SpO2 was 96 % on room air at rest. Patients SpO2 was 89% or above and did not qualify for home oxygen. Patient was walked for 6 minutes. SpO2 was 88 % during walking. Patients SpO2 was below 89% and qualified for home oxygen. Oxygen was applied at 1 lpm via nasal cannula to maintain a SpO2 between 90-92% while walking. Actual SpO2 was 92 %.

## 2022-03-23 NOTE — PROGRESS NOTES
Discharge teaching and instructions for diagnosis/procedure of respiratory failure completed with patient using teachback method. AVS reviewed. Prescriptions sent to patient's pharmacy. Patient voiced understanding regarding prescriptions, follow up appointments, and care of self at home.  Discharged in a wheelchair to  home with support per family

## 2022-03-23 NOTE — DISCHARGE SUMMARY
Hospital Medicine Discharge Summary      Patient Identification:  Name: Maricel Hutchison  : 1950  MRN: [id number]  Account: [de-identified]    Patient's PCP: Dayanna Jones    Admit Date: 3/18/2022    Discharge Date:   3/23/22    Admitting Physician: Earnestine Bueno DO    Discharge Physician: Deloise Gilford, DO, MBA        HPI:  Maricel Hutchison is a 73 y.o. 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 The patient was discharged home with 2 L oxygen via nasal cannula as needed and she has since been seeing her primary care physician as well as an urgent care center with encouragement to use incentive spirometry, Flovent inhaler, and steroids. She stated that her shortness of breath and hoarseness has gotten to the point where she feels a sensation of syncope but without actual event. She has been monitoring her home oxygen saturation and reports this value dropping into the mid 70s with ambulation on room air. Destiny Rodriguez Please see chart for more details regarding HPI. Hospital Course:   Maricel Hutchison is a 73 y.o. female who presented to [address] [place] [address] for the above referenced complaints. The patient was started on High-flow nasal cannula on admission for her stridor, administered solu-medrol, racepinephrine and admitted to the ICU stepdown floor. The pateint was consulted by ENT with recommendation for surgical intervention. On 3/22/22 the patient underwent suspension microlaryngoscopy with relief of glottic stenosis, airway balloon dilation, and laryngeal steroid injection with Dr. Tate Bergeron.  She was recovered well post-surgically and was removed from High-flow nasal cannula. The patient tolerated the procedure well, completed a home oxygen evaluation and remained stable prior to discharge. The patient was stable for discharge - all consultants were contacted and in agreement with plan for discharge. Appropriate follow up appointment was arranged prior to discharge. Please see below or view chart for more details from hospital course. Discharge Diagnoses:    · Posterior glottic stenosis  · Recovered compensated systolic heart failure      The patient was seen and examined on day of discharge and this discharge summary is in conjunction with any daily progress note from day of discharge. The patient understood, was in agreement, and verbalized the plan of care at time of discharge. Exam:    Vitals:   Vitals:    03/23/22 0325 03/23/22 0628 03/23/22 0915 03/23/22 1215   BP: 118/66  118/69 130/66   Pulse: 63  100 80   Resp: 21  22 20   Temp: 97.4 °F (36.3 °C)  97.3 °F (36.3 °C) 97.8 °F (36.6 °C)   TempSrc: Oral  Oral Oral   SpO2: 96% 96% 94% 92%   Weight:       Height:         Weight: Weight: 190 lb 11.2 oz (86.5 kg)    24 hour intake/output:    Intake/Output Summary (Last 24 hours) at 3/23/2022 1244  Last data filed at 3/23/2022 0103  Gross per 24 hour   Intake 1340 ml   Output 540 ml   Net 800 ml         Physical Exam  Vitals and nursing note reviewed. Constitutional:       General: She is awake. Appearance: Normal appearance. She is obese. HENT:      Head: Normocephalic and atraumatic. Right Ear: External ear normal.      Left Ear: External ear normal.      Nose: Nose normal.      Mouth/Throat:      Mouth: Mucous membranes are moist.      Pharynx: Oropharynx is clear. Eyes:      Conjunctiva/sclera: Conjunctivae normal.      Pupils: Pupils are equal, round, and reactive to light. Cardiovascular:      Rate and Rhythm: Normal rate and regular rhythm. Pulses: Normal pulses. Heart sounds: Normal heart sounds.    Pulmonary: Effort: Pulmonary effort is normal.      Breath sounds: Normal breath sounds. Abdominal:      General: Bowel sounds are normal.      Palpations: Abdomen is soft. Musculoskeletal:         General: Normal range of motion. Cervical back: Normal range of motion and neck supple. Skin:     General: Skin is warm and dry. Capillary Refill: Capillary refill takes less than 2 seconds. Neurological:      General: No focal deficit present. Mental Status: She is alert and oriented to person, place, and time. Mental status is at baseline. GCS: GCS eye subscore is 4. GCS verbal subscore is 5. GCS motor subscore is 6. Cranial Nerves: Cranial nerves are intact. Sensory: Sensation is intact. Motor: Motor function is intact. Coordination: Coordination is intact. Gait: Gait is intact. Psychiatric:         Attention and Perception: Attention and perception normal.         Mood and Affect: Mood and affect normal.         Speech: Speech normal.         Behavior: Behavior normal. Behavior is cooperative. Thought Content: Thought content normal.         Cognition and Memory: Cognition and memory normal.         Judgment: Judgment normal.             Labs: For convenience and continuity at follow-up the following most recent labs are provided:      CBC:    Lab Results   Component Value Date    WBC 9.9 03/23/2022    HGB 12.1 03/23/2022    HCT 37.7 03/23/2022     03/23/2022       Renal:    Lab Results   Component Value Date     03/23/2022    K 5.0 03/23/2022     03/23/2022    CO2 28 03/23/2022    BUN 25 03/23/2022    CREATININE 0.7 03/23/2022    CALCIUM 9.0 03/23/2022         Significant Diagnostic Studies    Radiology:   XR CHEST PORTABLE   Final Result   Cardiomegaly with no acute intrathoracic process. **This report has been created using voice recognition software. It may contain minor errors which are inherent in voice recognition technology. ** Final report electronically signed by Dr Jovani Foster on 3/22/2022 3:10 PM      CTA Chest W WO Contrast   Final Result   Impression:   1. No pulmonary emboli. 2. No thoracic aortic aneurysm or dissection   3. Bilateral peripheral opacities are improved since prior CT of    12/18/2021 and may represent resolving pneumonia versus interstitial lung    disease      This document has been electronically signed by: Kia Castellanos MD on    03/18/2022 09:20 PM      All CTs at this facility use dose modulation techniques and iterative    reconstructions, and/or weight-based dosing   when appropriate to reduce radiation to a low as reasonably achievable. XR CHEST PORTABLE   Final Result   Possible mild interstitial infiltrates. Interval improvement in aeration    since the prior study. This document has been electronically signed by: Lani Montes MD on    03/18/2022 07:47 PM            Consults:     IP CONSULT TO OTOLARYNGOLOGY    Disposition:    [x] Home       [] TCU       [] Rehab       [] Psych       [] SNF       [] [facility]       [] Other-    Condition at Discharge: stable    Code Status:  Full Code    Patient Instructions:  Discharge lab work: None  Activity: activity as tolerated  Diet: ADULT DIET; Regular; 3 carb choices (45 gm/meal); Low Potassium (Less than 3000 mg/day)    Follow-up Visits:  Sidney Wiley MD  [address] [address] [facility] [phone]    On 4/4/2022  4PM for hospital follow up        Discharge Medications:        Medication List      START taking these medications    predniSONE 10 MG tablet  Commonly known as: DELTASONE  Take 4 tablets by mouth daily for 3 days, THEN 2 tablets daily for 3 days, THEN 1 tablet daily for 3 days, THEN 0.5 tablets daily for 3 days.   Start taking on: March 23, 2022        CONTINUE taking these medications    albuterol sulfate  (90 Base) MCG/ACT inhaler  Inhale 2 puffs into the lungs every 6 hours as needed for Wheezing     aspirin 81 MG chewable tablet     diclofenac sodium 1 % Gel  Commonly known as: VOLTAREN  Apply 2 g topically 4 times daily as needed for Pain     docusate sodium 100 MG capsule  Commonly known as: COLACE  Take 1 capsule by mouth 2 times daily     famotidine 20 MG tablet  Commonly known as: PEPCID  Take 1 tablet by mouth 2 times daily     furosemide 40 MG tablet  Commonly known as: LASIX  Take 1 tablet by mouth daily     glipiZIDE 5 MG tablet  Commonly known as: GLUCOTROL  Take 1 tablet by mouth 2 times daily (with meals)     hydrOXYzine 25 MG capsule  Commonly known as: VISTARIL  Take 1 capsule by mouth 4 times daily as needed for Anxiety     melatonin 3 MG Tabs tablet  Take 2 tablets by mouth nightly     miconazole 2 % powder  Commonly known as: MICOTIN  Apply topically 2 times daily. nitroGLYCERIN 0.4 MG SL tablet  Commonly known as: NITROSTAT     OXYGEN  Inhale 2 L/min into the lungs continuous prn (during activities such as walking)     polycarbophil 625 MG tablet  Commonly known as: FIBERCON  Take 1 tablet by mouth daily     rosuvastatin 10 MG tablet  Commonly known as: CRESTOR     senna 8.6 MG tablet  Commonly known as: SENOKOT  Take 1 tablet by mouth 2 times daily as needed (Constipation)           Where to Get Your Medications      These medications were sent to [address] [place] [id number]    Phone: [phone]   · predniSONE 10 MG tablet               Discharge Time:  Time spent on discharge is >30 minutes in the examination, evaluation, counseling, and review of medications and discharge plan. The hospital course was discussed with the patient and all questions and concerns were addressed at that time. The patient was in agreement with and verbalized understanding of the plan of care and had no additional questions or complaints. The patient was instructed to follow-up with any scheduled outpatient appointments or to report to the nearest Emergency Department if new or worsening symptoms should arise. Thank you Wellington Valladares for the opportunity to be involved in this patient's care. Signed:    Electronically signed by Katherine Angulo DO, MBA on 3/23/2022 at 12:44 PM

## 2022-03-23 NOTE — PLAN OF CARE
Problem: RESPIRATORY  Goal: Able to breathe comfortably  Description: Able to breathe comfortably  Outcome: Met This Shift

## 2022-03-23 NOTE — PLAN OF CARE
Problem: Pain:  Goal: Control of acute pain  Description: Control of acute pain  3/23/2022 0729 by Yris Can RN  Outcome: Ongoing  3/23/2022 0104 by Yris Can RN  Outcome: Ongoing     Problem: RESPIRATORY  Goal: Able to breathe comfortably  Description: Able to breathe comfortably  3/23/2022 0658 by Yg Redding RCP  Outcome: Met This Shift

## 2022-03-27 ENCOUNTER — APPOINTMENT (OUTPATIENT)
Dept: GENERAL RADIOLOGY | Age: 72
DRG: 011 | End: 2022-03-27
Payer: MEDICARE

## 2022-03-27 ENCOUNTER — HOSPITAL ENCOUNTER (INPATIENT)
Age: 72
LOS: 12 days | Discharge: HOME HEALTH | DRG: 011 | End: 2022-04-08
Attending: INTERNAL MEDICINE | Admitting: INTERNAL MEDICINE
Payer: MEDICARE

## 2022-03-27 DIAGNOSIS — R06.02 SHORTNESS OF BREATH: Primary | ICD-10-CM

## 2022-03-27 DIAGNOSIS — E11.01 TYPE 2 DIABETES MELLITUS WITH HYPEROSMOLAR COMA, WITHOUT LONG-TERM CURRENT USE OF INSULIN (HCC): ICD-10-CM

## 2022-03-27 DIAGNOSIS — R06.1 STRIDOR: ICD-10-CM

## 2022-03-27 LAB
ANION GAP SERPL CALCULATED.3IONS-SCNC: 8 MEQ/L (ref 8–16)
BASOPHILS # BLD: 0.4 %
BASOPHILS ABSOLUTE: 0.1 THOU/MM3 (ref 0–0.1)
BUN BLDV-MCNC: 17 MG/DL (ref 7–22)
CALCIUM SERPL-MCNC: 9.4 MG/DL (ref 8.5–10.5)
CHLORIDE BLD-SCNC: 101 MEQ/L (ref 98–111)
CO2: 31 MEQ/L (ref 23–33)
CREAT SERPL-MCNC: 0.5 MG/DL (ref 0.4–1.2)
EKG ATRIAL RATE: 91 BPM
EKG P AXIS: 45 DEGREES
EKG P-R INTERVAL: 128 MS
EKG Q-T INTERVAL: 370 MS
EKG QRS DURATION: 82 MS
EKG QTC CALCULATION (BAZETT): 455 MS
EKG R AXIS: 35 DEGREES
EKG T AXIS: 71 DEGREES
EKG VENTRICULAR RATE: 91 BPM
EOSINOPHIL # BLD: 0.7 %
EOSINOPHILS ABSOLUTE: 0.1 THOU/MM3 (ref 0–0.4)
ERYTHROCYTE [DISTWIDTH] IN BLOOD BY AUTOMATED COUNT: 12.7 % (ref 11.5–14.5)
ERYTHROCYTE [DISTWIDTH] IN BLOOD BY AUTOMATED COUNT: 41.6 FL (ref 35–45)
GFR SERPL CREATININE-BSD FRML MDRD: > 90 ML/MIN/1.73M2
GLUCOSE BLD-MCNC: 171 MG/DL (ref 70–108)
GLUCOSE BLD-MCNC: 235 MG/DL (ref 70–108)
GLUCOSE BLD-MCNC: 299 MG/DL (ref 70–108)
GLUCOSE BLD-MCNC: 325 MG/DL (ref 70–108)
HCT VFR BLD CALC: 43.1 % (ref 37–47)
HEMOGLOBIN: 14.3 GM/DL (ref 12–16)
IMMATURE GRANS (ABS): 0.41 THOU/MM3 (ref 0–0.07)
IMMATURE GRANULOCYTES: 2.9 %
LYMPHOCYTES # BLD: 22.5 %
LYMPHOCYTES ABSOLUTE: 3.2 THOU/MM3 (ref 1–4.8)
MCH RBC QN AUTO: 29.6 PG (ref 26–33)
MCHC RBC AUTO-ENTMCNC: 33.2 GM/DL (ref 32.2–35.5)
MCV RBC AUTO: 89.2 FL (ref 81–99)
MONOCYTES # BLD: 7.6 %
MONOCYTES ABSOLUTE: 1.1 THOU/MM3 (ref 0.4–1.3)
MRSA SCREEN RT-PCR: NEGATIVE
NUCLEATED RED BLOOD CELLS: 0 /100 WBC
OSMOLALITY CALCULATION: 285 MOSMOL/KG (ref 275–300)
PLATELET # BLD: 257 THOU/MM3 (ref 130–400)
PMV BLD AUTO: 10.1 FL (ref 9.4–12.4)
POTASSIUM SERPL-SCNC: 4.2 MEQ/L (ref 3.5–5.2)
RBC # BLD: 4.83 MILL/MM3 (ref 4.2–5.4)
REASON FOR REJECTION: NORMAL
REJECTED TEST: NORMAL
SEG NEUTROPHILS: 65.9 %
SEGMENTED NEUTROPHILS ABSOLUTE COUNT: 9.2 THOU/MM3 (ref 1.8–7.7)
SODIUM BLD-SCNC: 140 MEQ/L (ref 135–145)
VANCOMYCIN RESISTANT ENTEROCOCCUS: NEGATIVE
WBC # BLD: 14 THOU/MM3 (ref 4.8–10.8)

## 2022-03-27 PROCEDURE — 6370000000 HC RX 637 (ALT 250 FOR IP): Performed by: NURSE PRACTITIONER

## 2022-03-27 PROCEDURE — 96374 THER/PROPH/DIAG INJ IV PUSH: CPT

## 2022-03-27 PROCEDURE — 94761 N-INVAS EAR/PLS OXIMETRY MLT: CPT

## 2022-03-27 PROCEDURE — 6370000000 HC RX 637 (ALT 250 FOR IP): Performed by: STUDENT IN AN ORGANIZED HEALTH CARE EDUCATION/TRAINING PROGRAM

## 2022-03-27 PROCEDURE — 82948 REAGENT STRIP/BLOOD GLUCOSE: CPT

## 2022-03-27 PROCEDURE — 2580000003 HC RX 258: Performed by: STUDENT IN AN ORGANIZED HEALTH CARE EDUCATION/TRAINING PROGRAM

## 2022-03-27 PROCEDURE — 2700000000 HC OXYGEN THERAPY PER DAY

## 2022-03-27 PROCEDURE — 2060000000 HC ICU INTERMEDIATE R&B

## 2022-03-27 PROCEDURE — 6360000002 HC RX W HCPCS: Performed by: NURSE PRACTITIONER

## 2022-03-27 PROCEDURE — 36415 COLL VENOUS BLD VENIPUNCTURE: CPT

## 2022-03-27 PROCEDURE — 80048 BASIC METABOLIC PNL TOTAL CA: CPT

## 2022-03-27 PROCEDURE — 87500 VANOMYCIN DNA AMP PROBE: CPT

## 2022-03-27 PROCEDURE — 85025 COMPLETE CBC W/AUTO DIFF WBC: CPT

## 2022-03-27 PROCEDURE — 99223 1ST HOSP IP/OBS HIGH 75: CPT | Performed by: INTERNAL MEDICINE

## 2022-03-27 PROCEDURE — 99284 EMERGENCY DEPT VISIT MOD MDM: CPT

## 2022-03-27 PROCEDURE — 93005 ELECTROCARDIOGRAM TRACING: CPT | Performed by: NURSE PRACTITIONER

## 2022-03-27 PROCEDURE — 70360 X-RAY EXAM OF NECK: CPT

## 2022-03-27 PROCEDURE — 94640 AIRWAY INHALATION TREATMENT: CPT

## 2022-03-27 PROCEDURE — 87641 MR-STAPH DNA AMP PROBE: CPT

## 2022-03-27 PROCEDURE — 71046 X-RAY EXAM CHEST 2 VIEWS: CPT

## 2022-03-27 RX ORDER — SODIUM CHLORIDE FOR INHALATION 0.9 %
3 VIAL, NEBULIZER (ML) INHALATION EVERY 4 HOURS PRN
Status: DISCONTINUED | OUTPATIENT
Start: 2022-03-27 | End: 2022-03-27 | Stop reason: SDUPTHER

## 2022-03-27 RX ORDER — DEXAMETHASONE SODIUM PHOSPHATE 4 MG/ML
6 INJECTION, SOLUTION INTRA-ARTICULAR; INTRALESIONAL; INTRAMUSCULAR; INTRAVENOUS; SOFT TISSUE DAILY
Status: DISCONTINUED | OUTPATIENT
Start: 2022-03-27 | End: 2022-03-27

## 2022-03-27 RX ORDER — SODIUM CHLORIDE 0.9 % (FLUSH) 0.9 %
5-40 SYRINGE (ML) INJECTION PRN
Status: DISCONTINUED | OUTPATIENT
Start: 2022-03-27 | End: 2022-04-08 | Stop reason: HOSPADM

## 2022-03-27 RX ORDER — ACETAMINOPHEN 325 MG/1
650 TABLET ORAL EVERY 6 HOURS PRN
Status: DISCONTINUED | OUTPATIENT
Start: 2022-03-27 | End: 2022-04-08 | Stop reason: HOSPADM

## 2022-03-27 RX ORDER — ONDANSETRON 2 MG/ML
4 INJECTION INTRAMUSCULAR; INTRAVENOUS EVERY 6 HOURS PRN
Status: DISCONTINUED | OUTPATIENT
Start: 2022-03-27 | End: 2022-04-08 | Stop reason: HOSPADM

## 2022-03-27 RX ORDER — GUAIFENESIN 600 MG/1
600 TABLET, EXTENDED RELEASE ORAL 2 TIMES DAILY
Status: DISCONTINUED | OUTPATIENT
Start: 2022-03-27 | End: 2022-04-08 | Stop reason: HOSPADM

## 2022-03-27 RX ORDER — DEXTROSE MONOHYDRATE 50 MG/ML
100 INJECTION, SOLUTION INTRAVENOUS PRN
Status: DISCONTINUED | OUTPATIENT
Start: 2022-03-27 | End: 2022-04-08 | Stop reason: HOSPADM

## 2022-03-27 RX ORDER — POLYETHYLENE GLYCOL 3350 17 G/17G
17 POWDER, FOR SOLUTION ORAL DAILY PRN
Status: DISCONTINUED | OUTPATIENT
Start: 2022-03-27 | End: 2022-04-08 | Stop reason: HOSPADM

## 2022-03-27 RX ORDER — NITROGLYCERIN 0.4 MG/1
0.4 TABLET SUBLINGUAL EVERY 5 MIN PRN
Status: DISCONTINUED | OUTPATIENT
Start: 2022-03-27 | End: 2022-04-08 | Stop reason: HOSPADM

## 2022-03-27 RX ORDER — NICOTINE POLACRILEX 4 MG
15 LOZENGE BUCCAL PRN
Status: DISCONTINUED | OUTPATIENT
Start: 2022-03-27 | End: 2022-03-27 | Stop reason: CLARIF

## 2022-03-27 RX ORDER — ROSUVASTATIN CALCIUM 10 MG/1
10 TABLET, COATED ORAL NIGHTLY
Status: DISCONTINUED | OUTPATIENT
Start: 2022-03-27 | End: 2022-04-08 | Stop reason: HOSPADM

## 2022-03-27 RX ORDER — HYDROXYZINE PAMOATE 25 MG/1
25 CAPSULE ORAL 4 TIMES DAILY PRN
Status: DISCONTINUED | OUTPATIENT
Start: 2022-03-27 | End: 2022-04-08 | Stop reason: HOSPADM

## 2022-03-27 RX ORDER — SODIUM CHLORIDE 9 MG/ML
INJECTION, SOLUTION INTRAVENOUS CONTINUOUS
Status: ACTIVE | OUTPATIENT
Start: 2022-03-27 | End: 2022-03-28

## 2022-03-27 RX ORDER — FUROSEMIDE 40 MG/1
40 TABLET ORAL DAILY
Status: DISCONTINUED | OUTPATIENT
Start: 2022-03-27 | End: 2022-04-08 | Stop reason: HOSPADM

## 2022-03-27 RX ORDER — LANOLIN ALCOHOL/MO/W.PET/CERES
6 CREAM (GRAM) TOPICAL NIGHTLY
Status: DISCONTINUED | OUTPATIENT
Start: 2022-03-27 | End: 2022-04-08 | Stop reason: HOSPADM

## 2022-03-27 RX ORDER — DEXTROSE MONOHYDRATE 25 G/50ML
12.5 INJECTION, SOLUTION INTRAVENOUS PRN
Status: DISCONTINUED | OUTPATIENT
Start: 2022-03-27 | End: 2022-03-27 | Stop reason: CLARIF

## 2022-03-27 RX ORDER — METOPROLOL SUCCINATE 25 MG/1
25 TABLET, EXTENDED RELEASE ORAL DAILY
Status: DISCONTINUED | OUTPATIENT
Start: 2022-03-27 | End: 2022-04-08 | Stop reason: HOSPADM

## 2022-03-27 RX ORDER — DEXAMETHASONE SODIUM PHOSPHATE 4 MG/ML
8 INJECTION, SOLUTION INTRA-ARTICULAR; INTRALESIONAL; INTRAMUSCULAR; INTRAVENOUS; SOFT TISSUE DAILY
Status: DISCONTINUED | OUTPATIENT
Start: 2022-03-28 | End: 2022-03-28

## 2022-03-27 RX ORDER — INSULIN GLARGINE 100 [IU]/ML
5 INJECTION, SOLUTION SUBCUTANEOUS NIGHTLY
Status: DISCONTINUED | OUTPATIENT
Start: 2022-03-27 | End: 2022-03-28

## 2022-03-27 RX ORDER — SENNA PLUS 8.6 MG/1
1 TABLET ORAL 2 TIMES DAILY PRN
Status: DISCONTINUED | OUTPATIENT
Start: 2022-03-27 | End: 2022-04-08 | Stop reason: HOSPADM

## 2022-03-27 RX ORDER — ASPIRIN 81 MG/1
81 TABLET, CHEWABLE ORAL DAILY
Status: DISCONTINUED | OUTPATIENT
Start: 2022-03-27 | End: 2022-04-08 | Stop reason: HOSPADM

## 2022-03-27 RX ORDER — SODIUM CHLORIDE 0.9 % (FLUSH) 0.9 %
5-40 SYRINGE (ML) INJECTION EVERY 12 HOURS SCHEDULED
Status: DISCONTINUED | OUTPATIENT
Start: 2022-03-27 | End: 2022-04-08 | Stop reason: HOSPADM

## 2022-03-27 RX ORDER — IPRATROPIUM BROMIDE AND ALBUTEROL SULFATE 2.5; .5 MG/3ML; MG/3ML
1 SOLUTION RESPIRATORY (INHALATION)
Status: DISCONTINUED | OUTPATIENT
Start: 2022-03-27 | End: 2022-03-30

## 2022-03-27 RX ORDER — ONDANSETRON 4 MG/1
4 TABLET, ORALLY DISINTEGRATING ORAL EVERY 8 HOURS PRN
Status: DISCONTINUED | OUTPATIENT
Start: 2022-03-27 | End: 2022-04-08 | Stop reason: HOSPADM

## 2022-03-27 RX ORDER — CALCIUM POLYCARBOPHIL 625 MG 625 MG/1
625 TABLET ORAL DAILY
Status: DISCONTINUED | OUTPATIENT
Start: 2022-03-27 | End: 2022-04-08 | Stop reason: HOSPADM

## 2022-03-27 RX ORDER — SODIUM CHLORIDE FOR INHALATION 0.9 %
3 VIAL, NEBULIZER (ML) INHALATION EVERY 4 HOURS PRN
Status: DISCONTINUED | OUTPATIENT
Start: 2022-03-27 | End: 2022-04-08 | Stop reason: HOSPADM

## 2022-03-27 RX ORDER — DEXAMETHASONE SODIUM PHOSPHATE 4 MG/ML
8 INJECTION, SOLUTION INTRA-ARTICULAR; INTRALESIONAL; INTRAMUSCULAR; INTRAVENOUS; SOFT TISSUE ONCE
Status: COMPLETED | OUTPATIENT
Start: 2022-03-27 | End: 2022-03-27

## 2022-03-27 RX ORDER — ACETAMINOPHEN 650 MG/1
650 SUPPOSITORY RECTAL EVERY 6 HOURS PRN
Status: DISCONTINUED | OUTPATIENT
Start: 2022-03-27 | End: 2022-04-08 | Stop reason: HOSPADM

## 2022-03-27 RX ORDER — DEXTROSE, SODIUM CHLORIDE, SODIUM LACTATE, POTASSIUM CHLORIDE, AND CALCIUM CHLORIDE 5; .6; .31; .03; .02 G/100ML; G/100ML; G/100ML; G/100ML; G/100ML
INJECTION, SOLUTION INTRAVENOUS CONTINUOUS
Status: DISCONTINUED | OUTPATIENT
Start: 2022-03-28 | End: 2022-03-29

## 2022-03-27 RX ORDER — SODIUM CHLORIDE 9 MG/ML
25 INJECTION, SOLUTION INTRAVENOUS PRN
Status: DISCONTINUED | OUTPATIENT
Start: 2022-03-27 | End: 2022-04-08 | Stop reason: HOSPADM

## 2022-03-27 RX ORDER — FAMOTIDINE 20 MG/1
20 TABLET, FILM COATED ORAL 2 TIMES DAILY
Status: DISCONTINUED | OUTPATIENT
Start: 2022-03-27 | End: 2022-04-08 | Stop reason: HOSPADM

## 2022-03-27 RX ADMIN — FAMOTIDINE 20 MG: 20 TABLET ORAL at 21:20

## 2022-03-27 RX ADMIN — IPRATROPIUM BROMIDE AND ALBUTEROL SULFATE 1 AMPULE: .5; 3 SOLUTION RESPIRATORY (INHALATION) at 15:50

## 2022-03-27 RX ADMIN — SODIUM CHLORIDE, PRESERVATIVE FREE 5 ML: 5 INJECTION INTRAVENOUS at 11:10

## 2022-03-27 RX ADMIN — METOPROLOL SUCCINATE 25 MG: 25 TABLET, FILM COATED, EXTENDED RELEASE ORAL at 13:02

## 2022-03-27 RX ADMIN — IPRATROPIUM BROMIDE AND ALBUTEROL SULFATE 1 AMPULE: .5; 3 SOLUTION RESPIRATORY (INHALATION) at 11:55

## 2022-03-27 RX ADMIN — GUAIFENESIN 600 MG: 600 TABLET, EXTENDED RELEASE ORAL at 13:03

## 2022-03-27 RX ADMIN — INSULIN GLARGINE 5 UNITS: 100 INJECTION, SOLUTION SUBCUTANEOUS at 21:26

## 2022-03-27 RX ADMIN — GUAIFENESIN 600 MG: 600 TABLET, EXTENDED RELEASE ORAL at 21:20

## 2022-03-27 RX ADMIN — IPRATROPIUM BROMIDE AND ALBUTEROL SULFATE 1 AMPULE: .5; 3 SOLUTION RESPIRATORY (INHALATION) at 21:06

## 2022-03-27 RX ADMIN — INSULIN LISPRO 8 UNITS: 100 INJECTION, SOLUTION INTRAVENOUS; SUBCUTANEOUS at 16:29

## 2022-03-27 RX ADMIN — Medication 6 MG: at 21:20

## 2022-03-27 RX ADMIN — RACEPINEPHRINE HYDROCHLORIDE 11.25 MG: 11.25 SOLUTION RESPIRATORY (INHALATION) at 08:40

## 2022-03-27 RX ADMIN — ISODIUM CHLORIDE 3 ML: 0.03 SOLUTION RESPIRATORY (INHALATION) at 08:40

## 2022-03-27 RX ADMIN — INSULIN LISPRO 4 UNITS: 100 INJECTION, SOLUTION INTRAVENOUS; SUBCUTANEOUS at 12:17

## 2022-03-27 RX ADMIN — DEXAMETHASONE SODIUM PHOSPHATE 8 MG: 4 INJECTION, SOLUTION INTRA-ARTICULAR; INTRALESIONAL; INTRAMUSCULAR; INTRAVENOUS; SOFT TISSUE at 09:29

## 2022-03-27 RX ADMIN — SODIUM CHLORIDE: 9 INJECTION, SOLUTION INTRAVENOUS at 11:09

## 2022-03-27 RX ADMIN — FAMOTIDINE 20 MG: 20 TABLET ORAL at 13:05

## 2022-03-27 RX ADMIN — INSULIN LISPRO 3 UNITS: 100 INJECTION, SOLUTION INTRAVENOUS; SUBCUTANEOUS at 21:26

## 2022-03-27 RX ADMIN — SODIUM CHLORIDE, PRESERVATIVE FREE 10 ML: 5 INJECTION INTRAVENOUS at 21:20

## 2022-03-27 RX ADMIN — ROSUVASTATIN CALCIUM 10 MG: 10 TABLET, FILM COATED ORAL at 21:20

## 2022-03-27 ASSESSMENT — ENCOUNTER SYMPTOMS
VOMITING: 0
COLOR CHANGE: 0
STRIDOR: 1
NAUSEA: 0
DIARRHEA: 0
COUGH: 0
ABDOMINAL DISTENTION: 0
SINUS PAIN: 0
RHINORRHEA: 0
SORE THROAT: 0
CHEST TIGHTNESS: 0
SHORTNESS OF BREATH: 1
ABDOMINAL PAIN: 0
WHEEZING: 0

## 2022-03-27 ASSESSMENT — PAIN SCALES - GENERAL
PAINLEVEL_OUTOF10: 0
PAINLEVEL_OUTOF10: 0

## 2022-03-27 NOTE — PLAN OF CARE
Problem: Falls - Risk of:  Goal: Will remain free from falls  Description: Will remain free from falls  Outcome: Ongoing  Note: Patient is free from any falls at this time. Will continue to monitor. Goal: Absence of physical injury  Description: Absence of physical injury  Outcome: Ongoing  Note: Patient is free from any physical injury at this time. Will continue to monitor. Problem: Daily Care:  Goal: Daily care needs are met  Description: Daily care needs are met  Outcome: Ongoing  Note: All daily care needs are met at this time. Will continue to monitor. Problem: Pain:  Goal: Patient's pain/discomfort is manageable  Description: Patient's pain/discomfort is manageable  Outcome: Ongoing  Note: Patient denies pain at this time. Will continue to monitor with hourly rounding. Problem: Skin Integrity:  Goal: Skin integrity will stabilize  Description: Skin integrity will stabilize  Outcome: Ongoing  Note: Patient was assessed upon arrival.  No signs of skin breakdown. Buttocks is red and blanchable. Will continue to monitor. Problem: Discharge Planning:  Goal: Patients continuum of care needs are met  Description: Patients continuum of care needs are met  Outcome: Ongoing  Note: Patient is not appropriate for D/C at this time. Will continue to monitor.

## 2022-03-27 NOTE — PROGRESS NOTES
Patient to the floor 4B room 6 from ED by Yamilka Kennedy at 10:44am.  Vitals were stable bp 133/80 , RR 18 , SpO2 100% on 2L NC. Patients voice is raspy upon arrival s/p Covid in the past.  No redness or swelling of the IV. IV is  INT and has blood return. Two RN skin assessment was done by myself and Andreina Hartley RN. Patients trach was dilated recently by Dr. Rd Ramirez. Plans to be NPO at midnight for possible scope tomorrow per Dr. Jewel Haywood.

## 2022-03-27 NOTE — ED PROVIDER NOTES
[facility] Emergency Department    CHIEF COMPLAINT       Chief Complaint   Patient presents with    Shortness of Breath       Nurses Notes reviewed and I agree except as noted in the HPI. HISTORY OF PRESENT ILLNESS    Diana Howe is a 73 y.o. female who presents to the ED for evaluation of shortness of breath. Patient notes history of COVID-19 in December, required 14 days of intubation, after COVID-19 had continued shortness of breath. Was admitted to the hospital recently for stridor. 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 Marie Turk, she was discharged on prednisone, she has been tapering her dose. She notes yesterday she worked in [employer], and was exhausted. Last evening she developed a worsening shortness of breath. Was unable to sleep. Notes symptoms seem to be worse when she lays flat. She called EMS this morning, upon travel to the ER she notes she coughed some mucus up which seems to have improved her shortness of breath. She continues to no stridor. She denies any wheezing. She denies fevers or chills. Denies chest pain. She has no nausea or vomiting. She has a past medical history of coronary artery disease, non-insulin-dependent diabetes mellitus, and hypertension. HPI was provided by the patient. REVIEW OF SYSTEMS     Review of Systems   Constitutional: Negative for activity change, chills, fatigue and fever. HENT: Positive for postnasal drip. Negative for congestion, rhinorrhea, sinus pain and sore throat. Respiratory: Positive for shortness of breath and stridor. Negative for cough, chest tightness and wheezing. Cardiovascular: Negative for chest pain. Gastrointestinal: Negative for abdominal distention, abdominal pain, diarrhea, nausea and vomiting. Genitourinary: Negative for difficulty urinating and dysuria. Musculoskeletal: Negative for neck pain. Skin: Negative for color change and rash. Allergic/Immunologic: Negative for immunocompromised state. Neurological: Negative for dizziness, weakness, numbness and headaches. Hematological: Does not bruise/bleed easily. Psychiatric/Behavioral: Negative for agitation, behavioral problems and confusion. PAST MEDICAL HISTORY     Past Medical History:   Diagnosis Date    Coronary artery disease     COVID     Primary hypertension     Type 2 diabetes mellitus ([facility] [address] 2018       SURGICALHISTORY      has a past surgical history that includes Coronary angioplasty with stent (); Cataract removal (Bilateral, );  section; Hysterectomy, total abdominal; hiatal hernia repair; sinus surgery; Cardiac surgery; eye surgery; and laryngoscopy (N/A, 3/22/2022). CURRENT MEDICATIONS       Current Discharge Medication List      CONTINUE these medications which have NOT CHANGED    Details   predniSONE (DELTASONE) 10 MG tablet Take 4 tablets by mouth daily for 3 days, THEN 2 tablets daily for 3 days, THEN 1 tablet daily for 3 days, THEN 0.5 tablets daily for 3 days. Qty: 20 tablet, Refills: 0      glipiZIDE (GLUCOTROL) 5 MG tablet Take 1 tablet by mouth 2 times daily (with meals) TAKE AS PREVIOUSLY USED AT HOME  Qty: 60 tablet, Refills: 3      hydrOXYzine (VISTARIL) 25 MG capsule Take 1 capsule by mouth 4 times daily as needed for Anxiety  Qty: 60 capsule, Refills: 1      albuterol sulfate  (90 Base) MCG/ACT inhaler Inhale 2 puffs into the lungs every 6 hours as needed for Wheezing  Qty: 18 g, Refills: 3      miconazole (MICOTIN) 2 % powder Apply topically 2 times daily.   Qty: 45 g, Refills: 1      diclofenac sodium (VOLTAREN) 1 % GEL Apply 2 g topically 4 times daily as needed for Pain  Qty: 150 g, Refills: 3      furosemide (LASIX) 40 MG tablet Take 1 tablet by mouth daily  Qty: 60 tablet, Refills: 3      docusate sodium (COLACE) 100 MG capsule Take 1 capsule by mouth 2 times daily  Qty: 60 capsule, Refills: 3      polycarbophil (FIBERCON) 625 MG tablet Take 1 tablet by mouth daily  Refills: 4      senna (SENOKOT) 8.6 MG tablet Take 1 tablet by mouth 2 times daily as needed (Constipation)  Qty: 60 tablet, Refills: 1      melatonin 3 MG TABS tablet Take 2 tablets by mouth nightly  Qty: 60 tablet, Refills: 3      famotidine (PEPCID) 20 MG tablet Take 1 tablet by mouth 2 times daily  Qty: 60 tablet, Refills: 3      OXYGEN Inhale 2 L/min into the lungs continuous prn (during activities such as walking)  Qty: 1 Canister, Refills: 5      rosuvastatin (CRESTOR) 10 MG tablet Take 10 mg by mouth daily      nitroGLYCERIN (NITROSTAT) 0.4 MG SL tablet Place 0.4 mg under the tongue every 5 minutes as needed for Chest pain up to max of 3 total doses. If no relief after 1 dose, call 911.      aspirin 81 MG chewable tablet Take 81 mg by mouth daily             ALLERGIES     is allergic to metformin and related, codeine, meperidine hcl, sulfa antibiotics, and sumatriptan. FAMILY HISTORY     She indicated that her father is . family history includes Lung Cancer in her father; Parkinson's Disease in her father.     SOCIAL HISTORY       Social History     Socioeconomic History    Marital status:      Spouse name: bailee     Number of children: 3    Years of education: Not on file    Highest education level: Not on file   Occupational History    Not on file   Tobacco Use    Smoking status: Never Smoker    Smokeless tobacco: Never Used   Vaping Use    Vaping Use: Never used    Passive vaping exposure: Yes   Substance and Sexual Activity    Alcohol use: Not Currently     Comment: drinks 1 glass of wine cooler or wine about 3 times per year    Drug use: Never    Sexual activity: Not Currently   Other Topics Concern    Not on file   Social History Narrative    Not on file     Social Determinants of Health     Financial Resource Strain:     Difficulty of Paying Living Expenses: Not on file   Food Insecurity:     Worried About Running Out of Food in the Last Year: Not on file    Ran Out of Food in the Last Year: Not on file   Transportation Needs:     Lack of Transportation (Medical): Not on file    Lack of Transportation (Non-Medical): Not on file   Physical Activity:     Days of Exercise per Week: Not on file    Minutes of Exercise per Session: Not on file   Stress:     Feeling of Stress : Not on file   Social Connections:     Frequency of Communication with Friends and Family: Not on file    Frequency of Social Gatherings with Friends and Family: Not on file    Attends Latter day Services: Not on file    Active Member of [address] [place] [address] [employer] or Organizations: Not on file    Attends Club or Organization Meetings: Not on file    Marital Status: Not on file   Intimate Partner Violence:     Fear of Current or Ex-Partner: Not on file    Emotionally Abused: Not on file    Physically Abused: Not on file    Sexually Abused: Not on file   Housing Stability:     Unable to Pay for Housing in the Last Year: Not on file    Number of [place] in the Last Year: Not on file    Unstable Housing in the Last Year: Not on file       PHYSICAL EXAM     INITIAL VITALS:  height is 5' 2\" (1.575 m) and weight is 191 lb (86.6 kg). Her oral temperature is 97.7 °F (36.5 °C). Her blood pressure is 125/84 and her pulse is 76. Her respiration is 26 and oxygen saturation is 99%. Physical Exam  Vitals and nursing note reviewed. Constitutional:       Appearance: Normal appearance. She is well-developed. HENT:      Head: Normocephalic. Mouth/Throat:      Mouth: Mucous membranes are moist.      Pharynx: Uvula midline. Eyes:      Conjunctiva/sclera: Conjunctivae normal.   Cardiovascular:      Rate and Rhythm: Normal rate and regular rhythm. Heart sounds: Normal heart sounds, S1 normal and S2 normal.   Pulmonary:      Effort: Pulmonary effort is normal. No tachypnea or respiratory distress. Breath sounds: Normal breath sounds. Stridor present. Chest:      Chest wall: No tenderness. Abdominal:      General: Bowel sounds are normal. There is no distension. Palpations: Abdomen is soft. Tenderness: There is no abdominal tenderness. Musculoskeletal:         General: Normal range of motion. Cervical back: Normal range of motion and neck supple. Lymphadenopathy:      Cervical: No cervical adenopathy. Skin:     General: Skin is warm and dry. Neurological:      Mental Status: She is alert and oriented to person, place, and time. Psychiatric:         Speech: Speech normal.         Behavior: Behavior normal.         Thought Content: Thought content normal.         DIFFERENTIAL DIAGNOSIS:   Glottic stenosis, pneumonia, postoperative edema    DIAGNOSTIC RESULTS         RADIOLOGY: non-plainfilm images(s) such as CT, Ultrasound and MRI are read by the radiologist.  Plain radiographic images are visualized and preliminarily interpreted by the emergency physician unless otherwise stated below. XR CHEST (2 VW)   Final Result   Stable radiographic appearance of the chest. No interval change since previous study dated 22 March 2022. Terry Hernandez **This report has been created using voice recognition software. It may contain minor errors which are inherent in voice recognition technology. **      Final report electronically signed by DR Cecy Redd on 3/27/2022 9:26 AM      XR NECK SOFT TISSUE   Final Result         1. The visualized airway is normal.   2. If the patient has persistent symptoms, CT scan may be helpful for better evaluation. .               **This report has been created using voice recognition software. It may contain minor errors which are inherent in voice recognition technology. **      Final report electronically signed by DR Cecy Redd on 3/27/2022 9:29 AM            LABS:   Labs Reviewed   CBC WITH AUTO DIFFERENTIAL - Abnormal; Notable for the following components:       Result Value    WBC 14.0 (*)     Segs Absolute 9.2 (*)     Immature Grans (Abs) 0.41 (*)     All other components within normal limits   BASIC METABOLIC PANEL - Abnormal; Notable for the following components:    Glucose 171 (*)     All other components within normal limits   POCT GLUCOSE - Abnormal; Notable for the following components:    POC Glucose 235 (*)     All other components within normal limits   VRE SCREEN BY PCR   SPECIMEN REJECTION   ANION GAP   GLOMERULAR FILTRATION RATE, ESTIMATED   OSMOLALITY   MRSA BY PCR       EMERGENCY DEPARTMENT COURSE:   Vitals:    Vitals:    03/27/22 1100 03/27/22 1200 03/27/22 1500 03/27/22 1550   BP: 133/80 115/79 125/84    Pulse: 81 76     Resp: 15 18  26   Temp:   97.7 °F (36.5 °C)    TempSrc:   Oral    SpO2: 100% 100%  99%   Weight:       Height:           MDM    Patient was seen and evaluated in the emergency department, patient appeared to be in no acute distress, vital signs reviewed, tachypnea noted. Significant stridor noted on exam, no abnormal lung sounds noted. Patient was ordered a racemic epi nebulizer. I discussed the case with Dr. Mamadou Godinez the on-call ENT, he recommends giving the patient a dose of 8 mg of Decadron, admission to hospitalist, will be evaluated by ENT while admission. I discussed the findings and plan of care with the patient she is agreeable with this plan of care.   Medications   sodium chloride flush 0.9 % injection 5-40 mL (5 mLs IntraVENous Given 3/27/22 1110)   sodium chloride flush 0.9 % injection 5-40 mL (has no administration in time range)   0.9 % sodium chloride infusion (has no administration in time range)   ondansetron (ZOFRAN-ODT) disintegrating tablet 4 mg (has no administration in time range)     Or   ondansetron (ZOFRAN) injection 4 mg (has no administration in time range)   polyethylene glycol (GLYCOLAX) packet 17 g (has no administration in time range)   acetaminophen (TYLENOL) tablet 650 mg (has no administration in time range)     Or   acetaminophen (TYLENOL) suppository 650 mg (has no administration in time range)   ipratropium-albuterol (DUONEB) nebulizer solution 1 ampule (1 ampule Inhalation Given 3/27/22 1550)   0.9 % sodium chloride infusion ( IntraVENous Rate/Dose Verify 3/27/22 1113)   racepinephrine HCl (VAPONEFPRIN) 2.25 % nebulizer solution NEBU 11.25 mg (has no administration in time range)   sodium chloride nebulizer 0.9 % solution 3 mL (has no administration in time range)   dexamethasone (DECADRON) injection 8 mg (has no administration in time range)   aspirin chewable tablet 81 mg ( Oral Automatically Held 3/30/22 0900)   diclofenac sodium (VOLTAREN) 1 % gel 2 g (has no administration in time range)   famotidine (PEPCID) tablet 20 mg (20 mg Oral Given 3/27/22 1305)   furosemide (LASIX) tablet 40 mg (40 mg Oral Not Given 3/27/22 1422)   hydrOXYzine (VISTARIL) capsule 25 mg (has no administration in time range)   melatonin tablet 6 mg (has no administration in time range)   nitroGLYCERIN (NITROSTAT) SL tablet 0.4 mg (has no administration in time range)   polycarbophil (FIBERCON) tablet 625 mg (625 mg Oral Not Given 3/27/22 1302)   rosuvastatin (CRESTOR) tablet 10 mg (has no administration in time range)   senna (SENOKOT) tablet 8.6 mg (has no administration in time range)   guaiFENesin (MUCINEX) extended release tablet 600 mg (600 mg Oral Given 3/27/22 1303)   metoprolol succinate (TOPROL XL) extended release tablet 25 mg (25 mg Oral Given 3/27/22 1302)   insulin glargine (LANTUS) injection vial 5 Units (has no administration in time range)   insulin lispro (HUMALOG) injection vial 0-12 Units (4 Units SubCUTAneous Given 3/27/22 1217)   insulin lispro (HUMALOG) injection vial 0-6 Units (has no administration in time range)   glucagon (rDNA) injection 1 mg (has no administration in time range)   dextrose 5 % solution (has no administration in time range)   glucose chewable tablet 4 each (has no administration in time range)   dextrose bolus (hypoglycemia) 10% 125 mL (has no administration in time range)     Or   dextrose bolus (hypoglycemia) 10% 250 mL (has no administration in time range)   dextrose 5 % in lactated ringers infusion (has no administration in time range)   racepinephrine HCl (VAPONEFPRIN) 2.25 % nebulizer solution NEBU 11.25 mg (11.25 mg Nebulization Given 3/27/22 0840)   dexamethasone (DECADRON) injection 8 mg (8 mg IntraVENous Given 3/27/22 0929)       Patient was seenindependently by myself. The patient's final impression and disposition and plan was determined by myself. CRITICAL CARE:   None    CONSULTS:  Hospitalist  Dr. Mimi Curry ENT    PROCEDURES:  None    FINAL IMPRESSION     1. Shortness of breath    2. Stridor          DISPOSITION/PLAN   Patient admitted  PATIENT REFERREDTO:  No follow-up provider specified. DISCHARGE MEDICATIONS:  Current Discharge Medication List          (Please note that portions of this note were completed with a voice recognition program.  Efforts were made to edit the dictations but occasionally words are mis-transcribed.)      Provider:  I personally performed the services described in the documentation,reviewed and edited the documentation which was dictated to the scribe in my presence, and it accurately records my words and actions.     Ananth Adams CNP 03/27/22 3:53 PM    Dearl Chamber Counts, APRN - CNP        Kanari, APRN - CNP  03/27/22 3746

## 2022-03-27 NOTE — H&P
Hospitalist Progress Note    Patient:  Luz Marina Gottlieb      Unit/Bed:4B-06/006-A    YOB: 1950    MRN: [id number]       Acct: [de-identified]     PCP: Johan Schmitz    Date of Admission: 3/27/2022    Assessment/Plan:    1. Acute on chronic hypoxic respiratory failure: 2/2 ? Glottic stenosis. Plan: Decadron 8mg daily, Racepinephrine PRN, ENT consult, Mucinex, Duoneb    2. Hx of Glottic stenosis: Stridor on physical exam. S/p suspension microlaryngoscopy with relief of glottic stenosis, airway balloon dilation, and laryngeal steroid injection on 3/22/22    3. HTN: /96. Start Metoprolol 25mg daily    4. Hx CAD: s/p PCI 2008. Echo (3/18/22) EF 60%, with normal LV function. Plan: start Metoprolol 25mg daily, cont Crestor, monitor I/O, Weight daily, recommend follow with Card/PCP for GDMT on discharged. 5. NIDDM2: Lantus 5 unit, SSI medium, Hypoglycemia protocol    6. GERD: cont Famotidine     7. Insomnia: cont Melatoninn    8. Anxiety: Vistaril PRN    Expected discharge date:  3/21/22    Disposition:    [x] Home       [] TCU       [] Rehab       [] Psych       [] SNF       [] [place]       [] Other-    Chief Complaint: SOB    Hospital Course: Roby Morris is 73years old female who presented to the hospital for worsening shortness of breath. Past medical history significant with recent hospital admission for glottic stenosis (3/18-23/22), CAD SP PIC, hypertension, diabetes, anxiety. Patient was recently admitted and managed for glottic stenosis with she underwent suspension microlaryngoscopy with relief of glottic stenosis, airway balloon dilation, and laryngeal steroid injection on 3/22/22. Patient was discharged home on 3/24. Since 3/25, patient has increase in mucus production which patient feeling \"something stuck in her throat\". She has been using Intensive spirometry at home which she recall that from yesterday she couldn't be able to get any air into the spirometry.  Patient guaiFENesin  600 mg Oral BID    metoprolol succinate  25 mg Oral Daily    insulin glargine  5 Units SubCUTAneous Nightly    insulin lispro  0-12 Units SubCUTAneous TID WC    insulin lispro  0-6 Units SubCUTAneous Nightly     PRN Meds: sodium chloride flush, sodium chloride, ondansetron **OR** ondansetron, polyethylene glycol, acetaminophen **OR** acetaminophen, racepinephrine HCl, sodium chloride nebulizer, diclofenac sodium, hydrOXYzine, nitroGLYCERIN, senna, glucose, dextrose, glucagon (rDNA), dextrose    No intake or output data in the 24 hours ending 03/27/22 1112    Diet:  ADULT DIET; Clear Liquid    Exam:  /80   Pulse 81   Temp 98.2 °F (36.8 °C) (Oral)   Resp 15   Ht 5' 2\" (1.575 m)   Wt 191 lb (86.6 kg)   LMP  (LMP Unknown)   SpO2 100%   BMI 34.93 kg/m²     General appearance: No apparent distress, appears stated age and cooperative. HEENT: Pupils equal, round, and reactive to light. Conjunctivae/corneas clear. Neck: Supple, with full range of motion. No jugular venous distention. Trachea midline. Respiratory:  Patent air way. Normal respiratory effort. Bilateral expiratory stridor. No Rales  Cardiovascular: Regular rate and rhythm with normal S1/S2 without murmurs, rubs or gallops. Abdomen: Soft, non-tender, non-distended with normal bowel sounds. Musculoskeletal: passive and active ROM x 4 extremities. Skin: Skin color, texture, turgor normal.  No rashes or lesions. Neurologic:  Neurovascularly intact without any focal sensory/motor deficits.  Cranial nerves: II-XII intact, grossly non-focal.  Psychiatric: Alert and oriented, thought content appropriate, normal insight  Capillary Refill: Brisk,< 3 seconds   Peripheral Pulses: +2 palpable, equal bilaterally       Labs:   Recent Labs     03/27/22  0752   WBC 14.0*   HGB 14.3   HCT 43.1        Recent Labs     03/27/22  0928      K 4.2      CO2 31   BUN 17   CREATININE 0.5   CALCIUM 9.4     No results for input(s): AST, ALT, BILIDIR, BILITOT, ALKPHOS in the last 72 hours. No results for input(s): INR in the last 72 hours. No results for input(s): [place] in the last 72 hours. Microbiology:      Urinalysis:      Lab Results   Component Value Date    NITRU NEGATIVE 01/10/2022    WBCUA 15-25 01/10/2022    BACTERIA NONE 01/10/2022    RBCUA > 200 01/10/2022    BLOODU LARGE 01/10/2022    SPECGRAV 1.026 12/31/2021    GLUCOSEU NEGATIVE 01/10/2022       Radiology:  XR CHEST (2 VW)   Final Result   Stable radiographic appearance of the chest. No interval change since previous study dated 22 March 2022. Nabil Dawkins **This report has been created using voice recognition software. It may contain minor errors which are inherent in voice recognition technology. **      Final report electronically signed by DR Varghese Guo on 3/27/2022 9:26 AM      XR NECK SOFT TISSUE   Final Result         1. The visualized airway is normal.   2. If the patient has persistent symptoms, CT scan may be helpful for better evaluation. .               **This report has been created using voice recognition software. It may contain minor errors which are inherent in voice recognition technology. **      Final report electronically signed by DR Varghese Guo on 3/27/2022 9:29 AM             DVT prophylaxis: [] Lovenox                                 [x] SCDs                                 [] SQ Heparin                                 [] Encourage ambulation           [] Already on Anticoagulation     Code Status: Full Code    Tele:   [x] yes             [] no    Electronically signed by Mario Mahmood DO on 3/27/2022 at 11:12 AM

## 2022-03-27 NOTE — PROGRESS NOTES
Patient and spouse in room at this time. Updated on plan of care and plans for tomorrow. All questions were answered appropriately at this time.

## 2022-03-27 NOTE — ED NOTES
Patient to the ED with complaints of shortness of breath. Patient states that one week ago she had her trachea dilated. She notes that she had long stay in hospital with COVID 19 infection. Patient states that she has had to wear 2 L nasal cannula since being discharged. Patient with audible wheezing. Patient denies further complaints at this time. Call light in reach.       Shannon Cooper RN  03/27/22 2617

## 2022-03-28 ENCOUNTER — APPOINTMENT (OUTPATIENT)
Dept: GENERAL RADIOLOGY | Age: 72
DRG: 011 | End: 2022-03-28
Payer: MEDICARE

## 2022-03-28 ENCOUNTER — ANESTHESIA (OUTPATIENT)
Dept: OPERATING ROOM | Age: 72
DRG: 011 | End: 2022-03-28
Payer: MEDICARE

## 2022-03-28 ENCOUNTER — ANESTHESIA EVENT (OUTPATIENT)
Dept: OPERATING ROOM | Age: 72
DRG: 011 | End: 2022-03-28
Payer: MEDICARE

## 2022-03-28 VITALS — OXYGEN SATURATION: 97 % | DIASTOLIC BLOOD PRESSURE: 48 MMHG | TEMPERATURE: 94.6 F | SYSTOLIC BLOOD PRESSURE: 74 MMHG

## 2022-03-28 LAB
ANION GAP SERPL CALCULATED.3IONS-SCNC: 9 MEQ/L (ref 8–16)
BUN BLDV-MCNC: 12 MG/DL (ref 7–22)
CALCIUM SERPL-MCNC: 8.9 MG/DL (ref 8.5–10.5)
CHLORIDE BLD-SCNC: 100 MEQ/L (ref 98–111)
CO2: 28 MEQ/L (ref 23–33)
CREAT SERPL-MCNC: 0.5 MG/DL (ref 0.4–1.2)
ERYTHROCYTE [DISTWIDTH] IN BLOOD BY AUTOMATED COUNT: 12.6 % (ref 11.5–14.5)
ERYTHROCYTE [DISTWIDTH] IN BLOOD BY AUTOMATED COUNT: 41.7 FL (ref 35–45)
GFR SERPL CREATININE-BSD FRML MDRD: > 90 ML/MIN/1.73M2
GLUCOSE BLD-MCNC: 234 MG/DL (ref 70–108)
GLUCOSE BLD-MCNC: 252 MG/DL (ref 70–108)
GLUCOSE BLD-MCNC: 253 MG/DL (ref 70–108)
GLUCOSE BLD-MCNC: 288 MG/DL (ref 70–108)
GLUCOSE BLD-MCNC: 316 MG/DL (ref 70–108)
HCT VFR BLD CALC: 36.6 % (ref 37–47)
HEMOGLOBIN: 11.9 GM/DL (ref 12–16)
MCH RBC QN AUTO: 29.1 PG (ref 26–33)
MCHC RBC AUTO-ENTMCNC: 32.5 GM/DL (ref 32.2–35.5)
MCV RBC AUTO: 89.5 FL (ref 81–99)
PLATELET # BLD: 209 THOU/MM3 (ref 130–400)
PMV BLD AUTO: 10 FL (ref 9.4–12.4)
POTASSIUM SERPL-SCNC: 4.7 MEQ/L (ref 3.5–5.2)
RBC # BLD: 4.09 MILL/MM3 (ref 4.2–5.4)
SODIUM BLD-SCNC: 137 MEQ/L (ref 135–145)
WBC # BLD: 11.6 THOU/MM3 (ref 4.8–10.8)

## 2022-03-28 PROCEDURE — 0C7S8ZZ DILATION OF LARYNX, VIA NATURAL OR ARTIFICIAL OPENING ENDOSCOPIC: ICD-10-PCS | Performed by: OTOLARYNGOLOGY

## 2022-03-28 PROCEDURE — 99221 1ST HOSP IP/OBS SF/LOW 40: CPT | Performed by: NURSE PRACTITIONER

## 2022-03-28 PROCEDURE — 2580000003 HC RX 258: Performed by: NURSE PRACTITIONER

## 2022-03-28 PROCEDURE — 36415 COLL VENOUS BLD VENIPUNCTURE: CPT

## 2022-03-28 PROCEDURE — 2700000000 HC OXYGEN THERAPY PER DAY

## 2022-03-28 PROCEDURE — 2060000000 HC ICU INTERMEDIATE R&B

## 2022-03-28 PROCEDURE — 2500000003 HC RX 250 WO HCPCS: Performed by: NURSE ANESTHETIST, CERTIFIED REGISTERED

## 2022-03-28 PROCEDURE — 87102 FUNGUS ISOLATION CULTURE: CPT

## 2022-03-28 PROCEDURE — 87205 SMEAR GRAM STAIN: CPT

## 2022-03-28 PROCEDURE — 6360000002 HC RX W HCPCS: Performed by: NURSE PRACTITIONER

## 2022-03-28 PROCEDURE — 94640 AIRWAY INHALATION TREATMENT: CPT

## 2022-03-28 PROCEDURE — 6370000000 HC RX 637 (ALT 250 FOR IP): Performed by: STUDENT IN AN ORGANIZED HEALTH CARE EDUCATION/TRAINING PROGRAM

## 2022-03-28 PROCEDURE — 6360000002 HC RX W HCPCS: Performed by: STUDENT IN AN ORGANIZED HEALTH CARE EDUCATION/TRAINING PROGRAM

## 2022-03-28 PROCEDURE — 94760 N-INVAS EAR/PLS OXIMETRY 1: CPT

## 2022-03-28 PROCEDURE — 2100000000 HC CCU R&B

## 2022-03-28 PROCEDURE — 85027 COMPLETE CBC AUTOMATED: CPT

## 2022-03-28 PROCEDURE — 3600000014 HC SURGERY LEVEL 4 ADDTL 15MIN: Performed by: OTOLARYNGOLOGY

## 2022-03-28 PROCEDURE — 31529 LARYNGOSCOPY AND DILATION: CPT | Performed by: OTOLARYNGOLOGY

## 2022-03-28 PROCEDURE — 94761 N-INVAS EAR/PLS OXIMETRY MLT: CPT

## 2022-03-28 PROCEDURE — 6360000002 HC RX W HCPCS: Performed by: NURSE ANESTHETIST, CERTIFIED REGISTERED

## 2022-03-28 PROCEDURE — 7100000001 HC PACU RECOVERY - ADDTL 15 MIN: Performed by: OTOLARYNGOLOGY

## 2022-03-28 PROCEDURE — C1726 CATH, BAL DIL, NON-VASCULAR: HCPCS | Performed by: OTOLARYNGOLOGY

## 2022-03-28 PROCEDURE — 7100000000 HC PACU RECOVERY - FIRST 15 MIN: Performed by: OTOLARYNGOLOGY

## 2022-03-28 PROCEDURE — 82948 REAGENT STRIP/BLOOD GLUCOSE: CPT

## 2022-03-28 PROCEDURE — 2720000010 HC SURG SUPPLY STERILE

## 2022-03-28 PROCEDURE — 6370000000 HC RX 637 (ALT 250 FOR IP)

## 2022-03-28 PROCEDURE — 80048 BASIC METABOLIC PNL TOTAL CA: CPT

## 2022-03-28 PROCEDURE — 3E0F83Z INTRODUCTION OF ANTI-INFLAMMATORY INTO RESPIRATORY TRACT, VIA NATURAL OR ARTIFICIAL OPENING ENDOSCOPIC: ICD-10-PCS | Performed by: OTOLARYNGOLOGY

## 2022-03-28 PROCEDURE — 3700000000 HC ANESTHESIA ATTENDED CARE: Performed by: OTOLARYNGOLOGY

## 2022-03-28 PROCEDURE — 2580000003 HC RX 258: Performed by: NURSE ANESTHETIST, CERTIFIED REGISTERED

## 2022-03-28 PROCEDURE — 2580000003 HC RX 258: Performed by: INTERNAL MEDICINE

## 2022-03-28 PROCEDURE — 2709999900 HC NON-CHARGEABLE SUPPLY: Performed by: OTOLARYNGOLOGY

## 2022-03-28 PROCEDURE — 3700000001 HC ADD 15 MINUTES (ANESTHESIA): Performed by: OTOLARYNGOLOGY

## 2022-03-28 PROCEDURE — 87070 CULTURE OTHR SPECIMN AEROBIC: CPT

## 2022-03-28 PROCEDURE — 6360000002 HC RX W HCPCS: Performed by: OTOLARYNGOLOGY

## 2022-03-28 PROCEDURE — 71045 X-RAY EXAM CHEST 1 VIEW: CPT

## 2022-03-28 PROCEDURE — 2580000003 HC RX 258: Performed by: STUDENT IN AN ORGANIZED HEALTH CARE EDUCATION/TRAINING PROGRAM

## 2022-03-28 PROCEDURE — 3600000004 HC SURGERY LEVEL 4 BASE: Performed by: OTOLARYNGOLOGY

## 2022-03-28 PROCEDURE — 31571 LARYNGOSCOP W/VC INJ + SCOPE: CPT | Performed by: OTOLARYNGOLOGY

## 2022-03-28 PROCEDURE — 99232 SBSQ HOSP IP/OBS MODERATE 35: CPT | Performed by: INTERNAL MEDICINE

## 2022-03-28 PROCEDURE — 3609027000 HC BRONCHOSCOPY

## 2022-03-28 RX ORDER — ONDANSETRON 2 MG/ML
INJECTION INTRAMUSCULAR; INTRAVENOUS PRN
Status: DISCONTINUED | OUTPATIENT
Start: 2022-03-28 | End: 2022-03-28 | Stop reason: SDUPTHER

## 2022-03-28 RX ORDER — SODIUM CHLORIDE FOR INHALATION 0.9 %
3 VIAL, NEBULIZER (ML) INHALATION ONCE
Status: COMPLETED | OUTPATIENT
Start: 2022-03-28 | End: 2022-03-28

## 2022-03-28 RX ORDER — ROCURONIUM BROMIDE 10 MG/ML
INJECTION, SOLUTION INTRAVENOUS PRN
Status: DISCONTINUED | OUTPATIENT
Start: 2022-03-28 | End: 2022-03-28 | Stop reason: SDUPTHER

## 2022-03-28 RX ORDER — INSULIN GLARGINE 100 [IU]/ML
15 INJECTION, SOLUTION SUBCUTANEOUS NIGHTLY
Status: DISCONTINUED | OUTPATIENT
Start: 2022-03-28 | End: 2022-03-29

## 2022-03-28 RX ORDER — TRIAMCINOLONE ACETONIDE 40 MG/ML
INJECTION, SUSPENSION INTRA-ARTICULAR; INTRAMUSCULAR PRN
Status: DISCONTINUED | OUTPATIENT
Start: 2022-03-28 | End: 2022-03-28 | Stop reason: ALTCHOICE

## 2022-03-28 RX ORDER — EPINEPHRINE 1 MG/ML
INJECTION, SOLUTION, CONCENTRATE INTRAVENOUS PRN
Status: DISCONTINUED | OUTPATIENT
Start: 2022-03-28 | End: 2022-03-28 | Stop reason: ALTCHOICE

## 2022-03-28 RX ORDER — PHENYLEPHRINE HYDROCHLORIDE 10 MG/ML
INJECTION INTRAVENOUS PRN
Status: DISCONTINUED | OUTPATIENT
Start: 2022-03-28 | End: 2022-03-28 | Stop reason: SDUPTHER

## 2022-03-28 RX ORDER — PROPOFOL 10 MG/ML
INJECTION, EMULSION INTRAVENOUS PRN
Status: DISCONTINUED | OUTPATIENT
Start: 2022-03-28 | End: 2022-03-28 | Stop reason: SDUPTHER

## 2022-03-28 RX ORDER — FENTANYL CITRATE 50 UG/ML
INJECTION, SOLUTION INTRAMUSCULAR; INTRAVENOUS PRN
Status: DISCONTINUED | OUTPATIENT
Start: 2022-03-28 | End: 2022-03-28 | Stop reason: SDUPTHER

## 2022-03-28 RX ORDER — LIDOCAINE HYDROCHLORIDE 20 MG/ML
INJECTION, SOLUTION INFILTRATION; PERINEURAL PRN
Status: DISCONTINUED | OUTPATIENT
Start: 2022-03-28 | End: 2022-03-28 | Stop reason: SDUPTHER

## 2022-03-28 RX ORDER — BUDESONIDE 0.5 MG/2ML
0.5 INHALANT ORAL 2 TIMES DAILY
Status: DISCONTINUED | OUTPATIENT
Start: 2022-03-28 | End: 2022-04-08 | Stop reason: HOSPADM

## 2022-03-28 RX ORDER — PROPOFOL 10 MG/ML
INJECTION, EMULSION INTRAVENOUS CONTINUOUS PRN
Status: DISCONTINUED | OUTPATIENT
Start: 2022-03-28 | End: 2022-03-28 | Stop reason: SDUPTHER

## 2022-03-28 RX ORDER — SODIUM CHLORIDE 9 MG/ML
INJECTION, SOLUTION INTRAVENOUS CONTINUOUS PRN
Status: DISCONTINUED | OUTPATIENT
Start: 2022-03-28 | End: 2022-03-28 | Stop reason: SDUPTHER

## 2022-03-28 RX ORDER — DEXAMETHASONE SODIUM PHOSPHATE 4 MG/ML
8 INJECTION, SOLUTION INTRA-ARTICULAR; INTRALESIONAL; INTRAMUSCULAR; INTRAVENOUS; SOFT TISSUE EVERY 8 HOURS
Status: DISCONTINUED | OUTPATIENT
Start: 2022-03-28 | End: 2022-03-30

## 2022-03-28 RX ORDER — BUSPIRONE HYDROCHLORIDE 10 MG/1
10 TABLET ORAL 3 TIMES DAILY
Status: DISCONTINUED | OUTPATIENT
Start: 2022-03-28 | End: 2022-04-08 | Stop reason: HOSPADM

## 2022-03-28 RX ORDER — MIDAZOLAM HYDROCHLORIDE 1 MG/ML
INJECTION INTRAMUSCULAR; INTRAVENOUS PRN
Status: DISCONTINUED | OUTPATIENT
Start: 2022-03-28 | End: 2022-03-28 | Stop reason: SDUPTHER

## 2022-03-28 RX ORDER — SODIUM CHLORIDE FOR INHALATION 0.9 %
VIAL, NEBULIZER (ML) INHALATION
Status: COMPLETED
Start: 2022-03-28 | End: 2022-03-28

## 2022-03-28 RX ADMIN — ONDANSETRON 4 MG: 2 INJECTION INTRAMUSCULAR; INTRAVENOUS at 13:09

## 2022-03-28 RX ADMIN — SODIUM CHLORIDE, PRESERVATIVE FREE 10 ML: 5 INJECTION INTRAVENOUS at 21:54

## 2022-03-28 RX ADMIN — MIDAZOLAM 1 MG: 1 INJECTION INTRAMUSCULAR; INTRAVENOUS at 12:19

## 2022-03-28 RX ADMIN — SODIUM CHLORIDE, PRESERVATIVE FREE 5 ML: 5 INJECTION INTRAVENOUS at 08:48

## 2022-03-28 RX ADMIN — ISODIUM CHLORIDE 3 ML: 0.03 SOLUTION RESPIRATORY (INHALATION) at 13:45

## 2022-03-28 RX ADMIN — Medication 6 MG: at 21:51

## 2022-03-28 RX ADMIN — PHENYLEPHRINE HYDROCHLORIDE 200 MCG: 10 INJECTION INTRAVENOUS at 12:46

## 2022-03-28 RX ADMIN — CALCIUM POLYCARBOPHIL 625 MG: 625 TABLET, FILM COATED ORAL at 09:33

## 2022-03-28 RX ADMIN — ROCURONIUM BROMIDE 40 MG: 10 INJECTION INTRAVENOUS at 12:30

## 2022-03-28 RX ADMIN — METOPROLOL SUCCINATE 25 MG: 25 TABLET, FILM COATED, EXTENDED RELEASE ORAL at 09:33

## 2022-03-28 RX ADMIN — FAMOTIDINE 20 MG: 20 TABLET ORAL at 08:47

## 2022-03-28 RX ADMIN — PHENYLEPHRINE HYDROCHLORIDE 200 MCG: 10 INJECTION INTRAVENOUS at 12:29

## 2022-03-28 RX ADMIN — BUDESONIDE 500 MCG: 0.5 INHALANT RESPIRATORY (INHALATION) at 22:33

## 2022-03-28 RX ADMIN — INSULIN LISPRO 3 UNITS: 100 INJECTION, SOLUTION INTRAVENOUS; SUBCUTANEOUS at 21:08

## 2022-03-28 RX ADMIN — ROSUVASTATIN CALCIUM 10 MG: 10 TABLET, FILM COATED ORAL at 21:51

## 2022-03-28 RX ADMIN — LIDOCAINE HYDROCHLORIDE 60 MG: 20 INJECTION, SOLUTION INFILTRATION; PERINEURAL at 12:19

## 2022-03-28 RX ADMIN — IPRATROPIUM BROMIDE AND ALBUTEROL SULFATE 1 AMPULE: .5; 3 SOLUTION RESPIRATORY (INHALATION) at 22:33

## 2022-03-28 RX ADMIN — SODIUM CHLORIDE: 9 INJECTION, SOLUTION INTRAVENOUS at 12:13

## 2022-03-28 RX ADMIN — FAMOTIDINE 20 MG: 20 TABLET ORAL at 21:54

## 2022-03-28 RX ADMIN — FENTANYL CITRATE 100 MCG: 50 INJECTION, SOLUTION INTRAMUSCULAR; INTRAVENOUS at 12:19

## 2022-03-28 RX ADMIN — SUGAMMADEX 200 MG: 100 INJECTION, SOLUTION INTRAVENOUS at 13:09

## 2022-03-28 RX ADMIN — RACEPINEPHRINE HYDROCHLORIDE 11.25 MG: 11.25 SOLUTION RESPIRATORY (INHALATION) at 13:45

## 2022-03-28 RX ADMIN — PHENYLEPHRINE HYDROCHLORIDE 200 MCG: 10 INJECTION INTRAVENOUS at 12:54

## 2022-03-28 RX ADMIN — INSULIN GLARGINE 15 UNITS: 100 INJECTION, SOLUTION SUBCUTANEOUS at 21:09

## 2022-03-28 RX ADMIN — DEXAMETHASONE SODIUM PHOSPHATE 8 MG: 4 INJECTION, SOLUTION INTRA-ARTICULAR; INTRALESIONAL; INTRAMUSCULAR; INTRAVENOUS; SOFT TISSUE at 19:30

## 2022-03-28 RX ADMIN — IPRATROPIUM BROMIDE AND ALBUTEROL SULFATE 1 AMPULE: .5; 3 SOLUTION RESPIRATORY (INHALATION) at 08:54

## 2022-03-28 RX ADMIN — GUAIFENESIN 600 MG: 600 TABLET, EXTENDED RELEASE ORAL at 08:47

## 2022-03-28 RX ADMIN — IPRATROPIUM BROMIDE AND ALBUTEROL SULFATE 1 AMPULE: .5; 3 SOLUTION RESPIRATORY (INHALATION) at 01:06

## 2022-03-28 RX ADMIN — SODIUM CHLORIDE: 9 INJECTION, SOLUTION INTRAVENOUS at 00:15

## 2022-03-28 RX ADMIN — BUSPIRONE HYDROCHLORIDE 10 MG: 10 TABLET ORAL at 16:32

## 2022-03-28 RX ADMIN — IPRATROPIUM BROMIDE AND ALBUTEROL SULFATE 1 AMPULE: .5; 3 SOLUTION RESPIRATORY (INHALATION) at 16:05

## 2022-03-28 RX ADMIN — INSULIN LISPRO 8 UNITS: 100 INJECTION, SOLUTION INTRAVENOUS; SUBCUTANEOUS at 16:36

## 2022-03-28 RX ADMIN — ACETAMINOPHEN 650 MG: 325 TABLET ORAL at 16:36

## 2022-03-28 RX ADMIN — PHENYLEPHRINE HYDROCHLORIDE 200 MCG: 10 INJECTION INTRAVENOUS at 12:36

## 2022-03-28 RX ADMIN — PROPOFOL 150 MG: 10 INJECTION, EMULSION INTRAVENOUS at 12:19

## 2022-03-28 RX ADMIN — GUAIFENESIN 600 MG: 600 TABLET, EXTENDED RELEASE ORAL at 21:51

## 2022-03-28 RX ADMIN — PROPOFOL 150 MCG/KG/MIN: 10 INJECTION, EMULSION INTRAVENOUS at 12:23

## 2022-03-28 RX ADMIN — INSULIN LISPRO 6 UNITS: 100 INJECTION, SOLUTION INTRAVENOUS; SUBCUTANEOUS at 09:26

## 2022-03-28 RX ADMIN — SODIUM CHLORIDE 3000 MG: 900 INJECTION INTRAVENOUS at 21:29

## 2022-03-28 RX ADMIN — Medication 3 ML: at 13:45

## 2022-03-28 RX ADMIN — BUSPIRONE HYDROCHLORIDE 10 MG: 10 TABLET ORAL at 21:11

## 2022-03-28 RX ADMIN — DEXAMETHASONE SODIUM PHOSPHATE 8 MG: 4 INJECTION, SOLUTION INTRA-ARTICULAR; INTRALESIONAL; INTRAMUSCULAR; INTRAVENOUS; SOFT TISSUE at 08:59

## 2022-03-28 RX ADMIN — FUROSEMIDE 40 MG: 40 TABLET ORAL at 08:47

## 2022-03-28 ASSESSMENT — PULMONARY FUNCTION TESTS
PIF_VALUE: 2
PIF_VALUE: 1
PIF_VALUE: 13
PIF_VALUE: 0
PIF_VALUE: 26
PIF_VALUE: 31
PIF_VALUE: 0
PIF_VALUE: 26
PIF_VALUE: 0
PIF_VALUE: 1
PIF_VALUE: 28
PIF_VALUE: 0
PIF_VALUE: 26
PIF_VALUE: 0
PIF_VALUE: 1
PIF_VALUE: 0
PIF_VALUE: 1
PIF_VALUE: 0
PIF_VALUE: 26
PIF_VALUE: 28
PIF_VALUE: 1
PIF_VALUE: 0
PIF_VALUE: 11
PIF_VALUE: 1
PIF_VALUE: 1
PIF_VALUE: 0
PIF_VALUE: 0
PIF_VALUE: 11
PIF_VALUE: 22
PIF_VALUE: 0
PIF_VALUE: 28
PIF_VALUE: 0
PIF_VALUE: 21
PIF_VALUE: 2
PIF_VALUE: 20
PIF_VALUE: 21
PIF_VALUE: 27
PIF_VALUE: 25
PIF_VALUE: 2
PIF_VALUE: 13
PIF_VALUE: 11
PIF_VALUE: 26
PIF_VALUE: 24
PIF_VALUE: 25
PIF_VALUE: 1
PIF_VALUE: 11
PIF_VALUE: 0
PIF_VALUE: 2
PIF_VALUE: 17
PIF_VALUE: 2
PIF_VALUE: 26
PIF_VALUE: 0
PIF_VALUE: 1
PIF_VALUE: 0
PIF_VALUE: 1
PIF_VALUE: 11
PIF_VALUE: 0
PIF_VALUE: 12
PIF_VALUE: 1
PIF_VALUE: 1
PIF_VALUE: 3
PIF_VALUE: 11
PIF_VALUE: 11
PIF_VALUE: 0
PIF_VALUE: 11
PIF_VALUE: 27
PIF_VALUE: 24
PIF_VALUE: 0
PIF_VALUE: 19
PIF_VALUE: 0
PIF_VALUE: 1
PIF_VALUE: 0
PIF_VALUE: 1
PIF_VALUE: 28
PIF_VALUE: 0
PIF_VALUE: 1
PIF_VALUE: 27
PIF_VALUE: 0
PIF_VALUE: 27
PIF_VALUE: 2
PIF_VALUE: 0
PIF_VALUE: 0

## 2022-03-28 ASSESSMENT — PAIN SCALES - GENERAL
PAINLEVEL_OUTOF10: 7
PAINLEVEL_OUTOF10: 0

## 2022-03-28 ASSESSMENT — PAIN DESCRIPTION - PAIN TYPE: TYPE: ACUTE PAIN

## 2022-03-28 NOTE — OP NOTE
Otolaryngology-Head and Neck Surgery Operative Note  Surgeon: Esvin Corbin M.D. Patient: Fauzia Voss  YOB: 1950  MRN: [id number]    Date of Procedure: 3/28/2022    Pre-Op Diagnosis: GLOTTIC STENOSIS, SOB    Post-Op Diagnosis: Same       Procedure(s):  SUSPENSION MICROLARYNGOSCOPY WITH BALLOON DILATION AND JET VENT, KENALOG INJECTION, flexible nasolaryngoscopy      Surgeon(s):  MD Cooper Berry MD    Assistant:   * No surgical staff found *    Anesthesia: General     Estimated Blood Loss (mL): 10    Complications: None immediate    Specimens:   ID Type Source Tests Collected by Time Destination   1 :  Swab Larynx CULTURE, FUNGUS, GRAM STAIN (Canceled), CULTURE, AEROBIC Esvin Corbin MD 3/28/2022 1253        Implants:  * No implants in log *      Drains: * No LDAs found *     INDICATIONS FOR PROCEDURE: Fauzia Voss is a 73 y.o. female with GLOTTIC STENOSIS, SOB. The risks, benefits, complications, treatment options and expected outcomes were discussed with her in detail both in clinic and again today. The possibilities of complication related to the procedure and anethesia were reiterated, and the consent form was updated appropriately. FINDINGS: A size 4 LMA was used to ventilate her adequately. Next the Constantin laryngoscope was used to obtain a grade 1 view of her larynx. The jet catheter was used for ventilation throughout the case. Some fibrinous debris was seen on her right cricoarytenoid joint. A culture of this location was obtained. Next the 18-20 mm airway balloon was inserted and she was inflated up to 5 diana for almost 2 minutes. This significantly improved her glottic airway. Next 40 mg of Kenalog was injected into the posterior glottis.        OPERATIVE DESCRIPTION:     After informed consent was obtained, the patient was taken from the preoperative holding area to the operating room, and placed in supine position on the operating table where general anesthesia was induced, and an LMA was placed. Once the patient was anesthetized, timeout was called to confirm the correct patient and procedure. The patient positioned in the room and on the bed. She was then prepped and draped in the usual fashion. A mouthguard was placed over her teeth, and a Constantin laryngoscope was then placed inside the mouth and used to visualize the airway. Jet ventilation was then started through the side port of the scope. Once the subglottis was visualized, the scope was suspended. The Conley mike was then placed and inserted to get a better look at the stenosis. At this point up cup forceps were used to remove the fibrinous exudate on the right cricoarytenoid joint. Tissue was removed and sent for culture. At this point a jet catheter was used for ventilation. An epinephrine pledget was placed momentarily for hemostasis. The airway balloon used up to a size of 18-20 mm was used to dilate the area of the stenosis. The balloon was left inflated for 1 minute. The balloon was deflated and removed, and a 30-degree telescope was then used to look past the stenosis down to the level of the telma and bilateral mainstem bronchi, and there were no other stenoses. At this point Kenalog-40 was loaded onto the microlaryngeal injector injected into the posterior glottis on the right-hand side. A 6-0 MLT endotracheal tube was then inserted and the check catheter was removed. The procedure was then deemed complete, and care was returned to the anesthesia team. The scope was taken off suspension and removed. The dental guard was taken off, and there was no injury to the teeth. She was subsequently extubated by the anesthesia team.  While she was waking up, I inserted a flexible bronchoscope through her nose and performed an awake flexible laryngoscopy. Her airway appeared similar to before the procedure, in that neither vocal cord was opening significantly. She continued to have a significant amount of supraglottic hyperfunction contributing to her narrowed airway. They proceeded to transport her to PACU in stable condition. I was present for and performed the patient's entire operation along with Dr. Shoshana Graham until she was taken to recovery. Photos pending upload       Disposition: She will be recovered in the PACU and then admitted to the floor for close monitoring.        Electronically signed by Yamil Roy MD on 3/28/2022 at 1:59 PM

## 2022-03-28 NOTE — PROGRESS NOTES
Order was in the chart for bronchoscopy procedure. Verified that consent was signed. Time-out was done. ICU disposable  mobile scope  was used. Assisted  Dr Brian Slater  with bronchoscopy procedure. Dr Brian Slater used the bronchoscope to visualize the vocal cords. Patient tolerated the procedure well. The bronchoscope was disposed of in a red bag and placed in dirty utility room.

## 2022-03-28 NOTE — PROGRESS NOTES
1340: Pt arrives to pacu. Pt on NC, respirations easy and unlabored. VSS  1345: Racemic epinephrine administered per Dr. Randall Carson. 1350: Medical imaging at bedside to obtain chest x-ray. 1400: Ice chips provided to patient per her request, tolerated well.   1405: Provided report to [facility] on 4B  1410: Pt meets criteria for discharge from pacu, awaiting transport. 1417: Pt transported back to  in stable condition.  VSS

## 2022-03-28 NOTE — PROGRESS NOTES
This patient is well-known to our service. Dr. Gina Denise just discharged her a couple of days ago. I discussed the plan with him. She will be kept on observation and then n.p.o. overnight. He will see her tomorrow morning and likely take her to the operating room tomorrow afternoon. Her oxygenation was quite stable. Her condition only involves one side of her larynx, and the other vocal cord was normally mobile, so she has at very little risk. Racemic epi and intravenous Decadron were given at my instruction in the emergency room. This would therefore not be a new consultation.

## 2022-03-28 NOTE — CARE COORDINATION
3/28/22, 10:28 AM EDT  DISCHARGE PLANNING EVALUATION:    Fauzia Voss       Admitted: 3/27/2022/ Alesia day: 1   Location: -06/006- Reason for admit: SOB (shortness of breath) [R06.02]   PMH:  has a past medical history of Coronary artery disease, COVID, Primary hypertension, and Type 2 diabetes mellitus ([address] Procedure:   3/27 XR Soft Tissue Neck: The visualized airway is normal  3/27 CXR: Stable radiographic appearance of the chest. No interval change since previous study dated 22 March 2022    Barriers to Discharge: Presented to ED with c/o worsening SOB. Patient was recently here for glottic stenosis and s/p suspension microlaryngoscopy, airway balloon dilation, and laryngeal steroid injection on 3/22/22 and discharged home on 3/24. Since 3/25, patient reports increase in mucous production making her feel like \"something stuck in her throat\". Received 8 mg IV decadron and racemic epi in ED and SOB improved. Sats improved from 93% on room air to 100%. ENT consulted. Admitted to . Plan for surgery this afternoon for suspension microlaryngoscopy with balloon dilation of the glottis using jet ventilation. Afebrile. NSR. Sats 94% on 1L O2. Ox4. Telemetry, SCDs. Buspar, IV decadron, pepcid, po lasix daily, mucinex, lantus, SSI, nebs, toprol xl, crestor. WBC 14 - now 11.6. Hgb 11.9. PCP: Gudelia Hinojosa  Readmission Risk Score: 22 ( )%    Patient Goals/Plan/Treatment Preferences: From home with . Needs meet & greet. Attempted x2 and staff/doctors in with patient. When returned for 3rd attempt, patient off floor for surgery. Plan pending course. Transportation/Food Security/Housekeeping Addressed:  No issues identified.

## 2022-03-28 NOTE — CONSULTS
Department of Otolaryngology  Consult Note    Reason for Consult:  Dyspnea, stridor  Requesting Physician:  Dr Elliott Byron:  \"I can't breathe\"    History Obtained From:  patient, electronic medical record    HISTORY OF PRESENT ILLNESS:                The patient is a 73 y.o. female who was admitted to [address] [place] [address] late last evening. She is known to our service - s/p suspension microlaryngosocpy with balloon dilation and laryngeal steroid injection secondary to glottic stenosis 3/22/2022 with Dr Bjorn Alonso and Dr Kadeem Schmidt. Patient was discharged from hospital on 3/23/2022 with significant improvement in her breathing. Patient reports that she continues on supplemental oxygen at home. She reports progressive shortness of breath and stridor over the weekend. She states that she truly felt she could not breathe and was not sure if she would live while traveling to the ER last evening. Patient has history of hospitalization here at [address] [place] [address] from 2021-2022 for severe COVID-19 infection with respiratory failure. She failed HFNC and BiPAP progressing to need for endotracheal intubation on 2021 secondary to severe ARDS. She was extubated on 2022. Patient reports hoarse voice following extubation. She was transferred to inpatient rehab from following acute hospital stay. She was discharged on supplemental oxygen. She had ER visit on 3/9/2022 for shortness of breath, then presented again on 3/18/2022 for shortness of breath with admission.       Past Medical History:        Diagnosis Date    Coronary artery disease     COVID     Primary hypertension     Type 2 diabetes mellitus ([facility] [address]        Past Surgical History:        Procedure Laterality Date    CARDIAC SURGERY      CATARACT REMOVAL Bilateral      SECTION      3 times    CORONARY ANGIOPLASTY WITH STENT PLACEMENT      stenting twice    EYE SURGERY      HIATAL HERNIA REPAIR late 36s    HYSTERECTOMY, TOTAL ABDOMINAL      in 1990s    LARYNGOSCOPY N/A 3/22/2022    SUSPENSON LARYNGOSCOPY WITH JET VENT, DILATION performed by Carlos James MD at [address] [place] [address]      in 1990s       Current Medications:   Current Facility-Administered Medications: insulin glargine (LANTUS) injection vial 15 Units, 15 Units, SubCUTAneous, Nightly  sodium chloride flush 0.9 % injection 5-40 mL, 5-40 mL, IntraVENous, 2 times per day  sodium chloride flush 0.9 % injection 5-40 mL, 5-40 mL, IntraVENous, PRN  0.9 % sodium chloride infusion, 25 mL, IntraVENous, PRN  ondansetron (ZOFRAN-ODT) disintegrating tablet 4 mg, 4 mg, Oral, Q8H PRN **OR** ondansetron (ZOFRAN) injection 4 mg, 4 mg, IntraVENous, Q6H PRN  polyethylene glycol (GLYCOLAX) packet 17 g, 17 g, Oral, Daily PRN  acetaminophen (TYLENOL) tablet 650 mg, 650 mg, Oral, Q6H PRN **OR** acetaminophen (TYLENOL) suppository 650 mg, 650 mg, Rectal, Q6H PRN  ipratropium-albuterol (DUONEB) nebulizer solution 1 ampule, 1 ampule, Inhalation, Q4H WA  racepinephrine HCl (VAPONEFPRIN) 2.25 % nebulizer solution NEBU 11.25 mg, 11.25 mg, Nebulization, Q4H PRN  sodium chloride nebulizer 0.9 % solution 3 mL, 3 mL, Nebulization, Q4H PRN  dexamethasone (DECADRON) injection 8 mg, 8 mg, IntraVENous, Daily  [Held by provider] aspirin chewable tablet 81 mg, 81 mg, Oral, Daily  diclofenac sodium (VOLTAREN) 1 % gel 2 g, 2 g, Topical, 4x Daily PRN  famotidine (PEPCID) tablet 20 mg, 20 mg, Oral, BID  furosemide (LASIX) tablet 40 mg, 40 mg, Oral, Daily  hydrOXYzine (VISTARIL) capsule 25 mg, 25 mg, Oral, 4x Daily PRN  melatonin tablet 6 mg, 6 mg, Oral, Nightly  nitroGLYCERIN (NITROSTAT) SL tablet 0.4 mg, 0.4 mg, SubLINGual, Q5 Min PRN  polycarbophil (FIBERCON) tablet 625 mg, 625 mg, Oral, Daily  rosuvastatin (CRESTOR) tablet 10 mg, 10 mg, Oral, Nightly  senna (SENOKOT) tablet 8.6 mg, 1 tablet, Oral, BID PRN  guaiFENesin (MUCINEX) extended release tablet 600 mg, 600 mg, Oral, BID  metoprolol succinate (TOPROL XL) extended release tablet 25 mg, 25 mg, Oral, Daily  insulin lispro (HUMALOG) injection vial 0-12 Units, 0-12 Units, SubCUTAneous, TID WC  insulin lispro (HUMALOG) injection vial 0-6 Units, 0-6 Units, SubCUTAneous, Nightly  glucagon (rDNA) injection 1 mg, 1 mg, IntraMUSCular, PRN  dextrose 5 % solution, 100 mL/hr, IntraVENous, PRN  glucose chewable tablet 4 each, 4 tablet, Oral, PRN  dextrose bolus (hypoglycemia) 10% 125 mL, 125 mL, IntraVENous, PRN **OR** dextrose bolus (hypoglycemia) 10% 250 mL, 250 mL, IntraVENous, PRN  [Held by provider] dextrose 5 % in lactated ringers infusion, , IntraVENous, Continuous    Allergies:  Review of patient's allergies indicates no known allergies. Social History:    TOBACCO:   reports that she has never smoked. She has never used smokeless tobacco.  ETOH:   reports previous alcohol use. DRUGS:   reports no history of drug use. Family History:       Problem Relation Age of Onset    Parkinson's Disease Father     Lung Cancer Father        REVIEW OF SYSTEMS:    A complete multi-organ review of systems was performed using a new patient questionnaire, and reviewed by me.   ENT:  negative except as noted in HPI  CONSTITUTIONAL:  negative except as noted in HPI  EYES:  negative except as noted in HPI  RESPIRATORY:  negative except as noted in HPI  CARDIOVASCULAR:  negative except as noted in HPI  GASTROINTESTINAL:  negative except as noted in HPI  GENITOURINARY:  negative except as noted in HPI  MUSCULOSKELETAL:  negative except as noted in HPI  SKIN:  negative except as noted in HPI  ENDOCRINE/METABOLIC: negative except as noted in HPI  HEMATOLOGIC/LYMPHATIC:  negative except as noted in HPI  ALLERGY/IMMUN: negative except as noted in HPI  NEUROLOGICAL:  negative except as noted in HPI  BEHAVIOR/PSYCH:  negative except as noted in HPI    PHYSICAL EXAM:    VITALS:  /66   Pulse 65   Temp 98.2 °F (36.8 °C) (Oral)   Resp 18   Ht 5' 2\" (1.575 m)   Wt 191 lb 8 oz (86.9 kg)   LMP  (LMP Unknown)   SpO2 94%   BMI 35.03 kg/m²     Alert, oriented and obese older adult female in mild distress. Sitting in bedside chair with supplemental oxygen per nasal cannula. + non-productive cough. Breathy vocal quality with mild hoarseness (able to speak in full sentence before obviously running out of breath support). Audible inspiratory stridor. Dr Butch Luna at bedside on my arrival post flexible laryngoscopy. DATA:    CBC with Differential:    Lab Results   Component Value Date    WBC 11.6 03/28/2022    RBC 4.09 03/28/2022    HGB 11.9 03/28/2022    HCT 36.6 03/28/2022     03/28/2022    MCV 89.5 03/28/2022    MCH 29.1 03/28/2022    MCHC 32.5 03/28/2022    NRBC 0 03/27/2022    SEGSPCT 65.9 03/27/2022    MONOPCT 7.6 03/27/2022    MONOSABS 1.1 03/27/2022    LYMPHSABS 3.2 03/27/2022    EOSABS 0.1 03/27/2022    BASOSABS 0.1 03/27/2022     BMP:    Lab Results   Component Value Date     03/28/2022    K 4.7 03/28/2022    K 5.0 03/23/2022     03/28/2022    CO2 28 03/28/2022    BUN 12 03/28/2022    LABALBU 4.3 03/18/2022    CREATININE 0.5 03/28/2022    CALCIUM 8.9 03/28/2022    LABGLOM >90 03/28/2022    GLUCOSE 253 03/28/2022       IMPRESSION/RECOMMENDATIONS:      43-year-old female with posterior glottic stenosis secondary to prolonged endotracheal intubation currently stable on supplemental oxygen per nasal cannula but with subjective dyspnea and inspiratory stridor. Flexible laryngoscopy at bedside limited, but appears as though both TVCs are medial with minimal movement/opening. Patient would benefit from suspension microlaryngoscopy with balloon dilation of the glottis as temporizing measure. 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 NPO. Continue supplemental O2; consider HFNC if desaturates or subjectively worse. Continue steroids and nebs.     Management of patient's care was collaborated with Dr Shannan Giraldo today.       Electronically signed by WYATT Valencia CNP on 3/28/2022 at 9:33 AM

## 2022-03-28 NOTE — PROGRESS NOTES
Hospitalist Progress Note    Patient:  Adilia Proctor      Unit/Bed:4B-10/010-A    YOB: 1950    MRN: [id number]       Acct: [de-identified]     PCP: Bruna Edwards    Date of Admission: 3/27/2022    Assessment/Plan:    1. Acute on chronic hypoxic respiratory failure: 2/2 ? Glottic stenosis. Received Decadron 8mg daily, Racepinephrine in ED which O2 Sat improved from 93% -> 100%. SOB was improved. Dr. Mamadou Matthews was contected by ED with recommend admitted and plan for Laryngoscopy (3/28)   3/28: reported feeling better after Duoneb. Planning for laryngoscopy today. Plan: cont Decadron 8mg daily, Racepinephrine PRN, ENT consult, Mucinex, Duoneb.     2. Hx of Glottic stenosis: Stridor on physical exam. S/p suspension microlaryngoscopy with relief of glottic stenosis, airway balloon dilation, and laryngeal steroid injection on 3/22/22     3. HTN: /96. Start Metoprolol 25mg daily     4. Hx CAD: s/p PCI 2008. Echo (3/18/22) EF 60%, with normal LV function. Plan: start Metoprolol 25mg daily, cont Crestor, monitor I/O, Weight daily, recommend follow with Card/PCP for GDMT on discharged.     5. NIDDM2: Lantus 5 unit, SSI medium, Hypoglycemia protocol     6. GERD: cont Famotidine      7. Insomnia: cont Melatoninn     8. Anxiety: patient is very anxious about couldn't be able to breath. Patient worry that she couldn't be able to make it to hospital next time. She reported that she could feel improving SOB and \"couldn't be able to get air into her lung\" if she could take a deep breath. However, patient couldn't be able to stay calm when she was SOB. Plan: Start Buspar     9. Leukocytosis: 2/2 steroid. Afebrile last 24 hrs.      Expected discharge date:  3/28/22    Disposition:    [x] Home       [] TCU       [] Rehab       [] Psych       [] SNF       [] [place]       [] Other-    Chief Complaint: SOB    Hospital Course: Milka Schreiber is 73years old female who presented to the hospital for worsening shortness of breath. Past medical history significant with recent hospital admission for glottic stenosis (3/18-23/22), CAD SP PIC, hypertension, diabetes, anxiety. Patient was recently admitted and managed for glottic stenosis with she underwent suspension microlaryngoscopy with relief of glottic stenosis, airway balloon dilation, and laryngeal steroid injection on 3/22/22. Patient was discharged home on 3/24. Since 3/25, patient has increase in mucus production which patient feeling \"something stuck in her throat\". She has been using Intensive spirometry at home which she recall that from yesterday she couldn't be able to get any air into the spirometry. Patient reported SOB has been worsening which she decided to to to ED. Denied any fever, chill, chest pain, yellow cough without sputum production, recent sick contact.     In the ED, patient O2 sat 93% on room air. Patient received Decadron 8 mg and racemic epi which her SOB has been improved and O2 sat 100% on room air. Dr. Mamadou Godinez (ENT) was contacted by ER and recommended to admit patient. Patient is planning for repeated laryngoscopy 3/28/22.     Upon visit patient can talk in full conversation without respiratory distress. Subjective (past 24 hours): No event over night. Reported feeling better after Duoneb treatment. Currently on 2L NC with O2 Sat 94%. Remain afebrile since admission. Denied any hemoptysis, chest pain, worsening SOB      ROS  Constitutional: Negative for appetite change, chills, diaphoresis, fever and unexpected weight change. HENT: Negative for congestion, dental problem, mouth sores, nosebleed  Respiratory: Negative for choking, chest tightness, wheezing and stridor. Cardiovascular: Negative for chest pain and palpitations. Gastrointestinal: Negative for anal bleeding, diarrhea, nausea and vomiting. Genitourinary: Negative for dysuria, flank pain, hematuria and urgency.    Psychiatric/Behavioral: Negative for agitation, behavioral problems, confusion, decreased concentration, dysphoric mood and hallucinations. Medications:  Reviewed    Infusion Medications    sodium chloride      dextrose      [Held by provider] dextrose 5% in lactated ringers       Scheduled Medications    insulin glargine  15 Units SubCUTAneous Nightly    busPIRone  10 mg Oral TID    sodium chloride flush  5-40 mL IntraVENous 2 times per day    ipratropium-albuterol  1 ampule Inhalation Q4H WA    dexamethasone  8 mg IntraVENous Daily    [Held by provider] aspirin  81 mg Oral Daily    famotidine  20 mg Oral BID    furosemide  40 mg Oral Daily    melatonin  6 mg Oral Nightly    polycarbophil  625 mg Oral Daily    rosuvastatin  10 mg Oral Nightly    guaiFENesin  600 mg Oral BID    metoprolol succinate  25 mg Oral Daily    insulin lispro  0-12 Units SubCUTAneous TID WC    insulin lispro  0-6 Units SubCUTAneous Nightly     PRN Meds: sodium chloride flush, sodium chloride, ondansetron **OR** ondansetron, polyethylene glycol, acetaminophen **OR** acetaminophen, racepinephrine HCl, sodium chloride nebulizer, diclofenac sodium, hydrOXYzine, nitroGLYCERIN, senna, glucagon (rDNA), dextrose, glucose, dextrose bolus (hypoglycemia) **OR** dextrose bolus (hypoglycemia)      Intake/Output Summary (Last 24 hours) at 3/28/2022 1617  Last data filed at 3/28/2022 1338  Gross per 24 hour   Intake 2474.76 ml   Output 10 ml   Net 2464.76 ml       Diet:  Diet NPO    Exam:  /75   Pulse 97   Temp 98.1 °F (36.7 °C) (Oral)   Resp 20   Ht 5' 2\" (1.575 m)   Wt 191 lb 8 oz (86.9 kg)   LMP  (LMP Unknown)   SpO2 93%   BMI 35.03 kg/m²     General appearance: No apparent distress, appears stated age and cooperative. HEENT: Pupils equal, round, and reactive to light. Conjunctivae/corneas clear. Neck: Supple, with full range of motion. No jugular venous distention. Trachea midline. Respiratory:  Normal respiratory effort.  Bilaterally  Expiratory wheezes on lower lung fields. Cardiovascular: Regular rate and rhythm with normal S1/S2 without murmurs, rubs or gallops. Abdomen: Soft, non-tender, non-distended with normal bowel sounds. Musculoskeletal: passive and active ROM x 4 extremities. Skin: Skin color, texture, turgor normal.  No rashes or lesions. Neurologic:  Neurovascularly intact without any focal sensory/motor deficits. Cranial nerves: II-XII intact, grossly non-focal.  Psychiatric: Alert and oriented, thought content appropriate, normal insight  Capillary Refill: Brisk,< 3 seconds   Peripheral Pulses: +2 palpable, equal bilaterally       Labs:   Recent Labs     03/27/22  0752 03/28/22  0657   WBC 14.0* 11.6*   HGB 14.3 11.9*   HCT 43.1 36.6*    209     Recent Labs     03/27/22  0928 03/28/22  0657    137   K 4.2 4.7    100   CO2 31 28   BUN 17 12   CREATININE 0.5 0.5   CALCIUM 9.4 8.9     No results for input(s): AST, ALT, BILIDIR, BILITOT, ALKPHOS in the last 72 hours. No results for input(s): INR in the last 72 hours. No results for input(s): Osmani Clap in the last 72 hours. Microbiology:      Urinalysis:      Lab Results   Component Value Date    NITRU NEGATIVE 01/10/2022    WBCUA 15-25 01/10/2022    BACTERIA NONE 01/10/2022    RBCUA > 200 01/10/2022    BLOODU LARGE 01/10/2022    SPECGRAV 1.026 12/31/2021    GLUCOSEU NEGATIVE 01/10/2022       Radiology:  XR CHEST PORTABLE   Final Result   Stable radiographic appearance of the chest. No evidence of an acute process. **This report has been created using voice recognition software. It may contain minor errors which are inherent in voice recognition technology. **      Final report electronically signed by Dr. Rani Soliman MD on 3/28/2022 2:10 PM      XR CHEST (2 VW)   Final Result   Stable radiographic appearance of the chest. No interval change since previous study dated 22 March 2022. Freddy Narayanan                **This report has been created using voice recognition software. It may contain minor errors which are inherent in voice recognition technology. **      Final report electronically signed by DR Viktor Fay on 3/27/2022 9:26 AM      XR NECK SOFT TISSUE   Final Result         1. The visualized airway is normal.   2. If the patient has persistent symptoms, CT scan may be helpful for better evaluation. .               **This report has been created using voice recognition software. It may contain minor errors which are inherent in voice recognition technology. **      Final report electronically signed by DR Viktor Fay on 3/27/2022 9:29 AM          DVT prophylaxis: [] Lovenox                                 [x] SCDs                                 [] SQ Heparin                                 [] Encourage ambulation           [] Already on Anticoagulation     Code Status: Full Code    PT/OT Eval Status: 3/28/22    Tele:   [] yes             [] no    Electronically signed by Dax Morris DO on 3/28/2022 at 4:17 PM

## 2022-03-28 NOTE — ANESTHESIA PRE PROCEDURE
Department of Anesthesiology  Preprocedure Note       Name:  Kina Juarez   Age:  73 y.o.  :  1950                                          MRN:  [id number]         Date:  3/28/2022      Surgeon: Ochoa Moreno):  Marian Birmingham MD    Procedure: Procedure(s):  SUSPENSION MICROLARYNGOSCOPY WITH BALLOON DILATION AND JET VENT    Medications prior to admission:   Prior to Admission medications    Medication Sig Start Date End Date Taking? Authorizing Provider   predniSONE (DELTASONE) 10 MG tablet Take 4 tablets by mouth daily for 3 days, THEN 2 tablets daily for 3 days, THEN 1 tablet daily for 3 days, THEN 0.5 tablets daily for 3 days. 3/23/22 4/4/22  Ozella Becca Angulo DO   glipiZIDE (GLUCOTROL) 5 MG tablet Take 1 tablet by mouth 2 times daily (with meals) TAKE AS PREVIOUSLY USED AT HOME 3/23/22   Rayray Esteban MD   hydrOXYzine (VISTARIL) 25 MG capsule Take 1 capsule by mouth 4 times daily as needed for Anxiety  Patient not taking: Reported on 3/27/2022 1/27/22   Serena Benitez MD   albuterol sulfate  (90 Base) MCG/ACT inhaler Inhale 2 puffs into the lungs every 6 hours as needed for Wheezing  Patient not taking: Reported on 3/27/2022 1/27/22   Serena Benitez MD   miconazole (MICOTIN) 2 % powder Apply topically 2 times daily.   Patient not taking: Reported on 3/27/2022 1/27/22   Serena Benitez MD   diclofenac sodium (VOLTAREN) 1 % GEL Apply 2 g topically 4 times daily as needed for Pain 22   Serena Benitez MD   furosemide (LASIX) 40 MG tablet Take 1 tablet by mouth daily  Patient not taking: Reported on 3/27/2022 1/28/22   Serena Benitez MD   docusate sodium (COLACE) 100 MG capsule Take 1 capsule by mouth 2 times daily  Patient not taking: Reported on 3/27/2022 1/27/22   Serena Benitez MD   polycarbophil [facility]) 625 MG tablet Take 1 tablet by mouth daily  Patient not taking: Reported on 3/27/2022 1/28/22   Serena Benitez MD   senna (SENOKOT) 8.6 MG tablet Take 1 tablet by mouth 2 times daily as needed (Constipation)  Patient not taking: Reported on 3/27/2022 1/27/22 5/27/22  Sri Gonzalez MD   melatonin 3 MG TABS tablet Take 2 tablets by mouth nightly 1/27/22   Sri Gonzalez MD   famotidine (PEPCID) 20 MG tablet Take 1 tablet by mouth 2 times daily  Patient not taking: Reported on 3/27/2022 1/27/22   Sri Gonzalez MD   OXYGEN Inhale 2 L/min into the lungs continuous prn (during activities such as walking) 1/27/22   Sri Gonzalez MD   rosuvastatin (CRESTOR) 10 MG tablet Take 10 mg by mouth daily    Historical Provider, MD   nitroGLYCERIN (NITROSTAT) 0.4 MG SL tablet Place 0.4 mg under the tongue every 5 minutes as needed for Chest pain up to max of 3 total doses. If no relief after 1 dose, call 911. Patient not taking: Reported on 3/27/2022    Historical Provider, MD   aspirin 81 MG chewable tablet Take 81 mg by mouth daily    Historical Provider, MD       Current medications:    No current facility-administered medications for this visit. No current outpatient medications on file.      Facility-Administered Medications Ordered in Other Visits   Medication Dose Route Frequency Provider Last Rate Last Admin    insulin glargine (LANTUS) injection vial 15 Units  15 Units SubCUTAneous Nightly Martina Benton V, DO        busPIRone (BUSPAR) tablet 10 mg  10 mg Oral TID Otilia Puff V, DO        sodium chloride flush 0.9 % injection 5-40 mL  5-40 mL IntraVENous 2 times per day Otilia Puff V, DO   5 mL at 03/28/22 0848    sodium chloride flush 0.9 % injection 5-40 mL  5-40 mL IntraVENous PRN Martina Benton V, DO        0.9 % sodium chloride infusion  25 mL IntraVENous PRN Martina Benton V, DO        ondansetron (ZOFRAN-ODT) disintegrating tablet 4 mg  4 mg Oral Q8H PRN Martina Benton V, DO        Or    ondansetron (ZOFRAN) injection 4 mg  4 mg IntraVENous Q6H PRN Martina Benton V, DO        polyethylene glycol (GLYCOLAX) packet 17 g  17 g Oral Daily PRN Martina Benton V, DO        acetaminophen (TYLENOL) tablet 650 mg 650 mg Oral Q6H PRN Eward Budds V, DO        Or    acetaminophen (TYLENOL) suppository 650 mg  650 mg Rectal Q6H PRN Laney Rodriguezing, DO        ipratropium-albuterol (DUONEB) nebulizer solution 1 ampule  1 ampule Inhalation Q4H WA Martina Benton V, DO   1 ampule at 03/28/22 0854    racepinephrine HCl (VAPONEFPRIN) 2.25 % nebulizer solution NEBU 11.25 mg  11.25 mg Nebulization Q4H PRN Martina Benton V, DO        sodium chloride nebulizer 0.9 % solution 3 mL  3 mL Nebulization Q4H PRN Martina Benton V, DO        dexamethasone (DECADRON) injection 8 mg  8 mg IntraVENous Daily Martina Benton V, DO   8 mg at 03/28/22 0859    [Held by provider] aspirin chewable tablet 81 mg  81 mg Oral Daily Martina Benton V, DO        diclofenac sodium (VOLTAREN) 1 % gel 2 g  2 g Topical 4x Daily PRN Martina Benton V, DO        famotidine (PEPCID) tablet 20 mg  20 mg Oral BID Martina Benton V, DO   20 mg at 03/28/22 0847    furosemide (LASIX) tablet 40 mg  40 mg Oral Daily Martina Benton V, DO   40 mg at 03/28/22 0847    hydrOXYzine (VISTARIL) capsule 25 mg  25 mg Oral 4x Daily PRN Martina Benton V, DO        melatonin tablet 6 mg  6 mg Oral Nightly Martina Benton V, DO   6 mg at 03/27/22 2120    nitroGLYCERIN (NITROSTAT) SL tablet 0.4 mg  0.4 mg SubLINGual Q5 Min PRN Martina Benton V, DO        polycarbophil (FIBERCON) tablet 625 mg  625 mg Oral Daily Martina Benton V, DO   625 mg at 03/28/22 0933    rosuvastatin (CRESTOR) tablet 10 mg  10 mg Oral Nightly Martina Benton V, DO   10 mg at 03/27/22 2120    senna (SENOKOT) tablet 8.6 mg  1 tablet Oral BID PRN Laney Rodriguezing, DO        guaiFENesin [facility]) extended release tablet 600 mg  600 mg Oral BID Martina Benton V, DO   600 mg at 03/28/22 0847    metoprolol succinate (TOPROL XL) extended release tablet 25 mg  25 mg Oral Daily Martina Benton V, DO   25 mg at 03/28/22 0933    insulin lispro (HUMALOG) injection vial 0-12 Units  0-12 Units SubCUTAneous TID [facility] Benton V, DO   6 Units at 03/28/22 0926    insulin lispro (HUMALOG) injection vial 0-6 Units  0-6 Units SubCUTAneous Nightly Martina Benton V, DO   3 Units at 22    glucagon (rDNA) injection 1 mg  1 mg IntraMUSCular PRN Hedda Ket V, DO        dextrose 5 % solution  100 mL/hr IntraVENous PRN Martina Benton V, DO        glucose chewable tablet 4 each  4 tablet Oral PRN Martina Benton V, DO        dextrose bolus (hypoglycemia) 10% 125 mL  125 mL IntraVENous PRN Martina Benton V, DO        Or    dextrose bolus (hypoglycemia) 10% 250 mL  250 mL IntraVENous PRN Martina Benton V, DO        [Held by provider] dextrose 5 % in lactated ringers infusion   IntraVENous Continuous Khai Torres MD           Allergies: Allergies   Allergen Reactions    Metformin And Related Other (See Comments)     diarrhea    Codeine Nausea And Vomiting    Meperidine Hcl Nausea And Vomiting    Sulfa Antibiotics Nausea And Vomiting    Sumatriptan Nausea And Vomiting       Problem List:    Patient Active Problem List   Diagnosis Code    COVID-19 U07.1    Hypoxia R09.02    Acute respiratory failure with hypoxia (HCC) J96.01    Debility R53.81    Physical deconditioning R53.81    History of COVID-19 Z86.16    Dysarthria R47.1    Primary hypertension I10    Type 2 diabetes mellitus (HCC) E11.9    Obesity (BMI 30-39. 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    SOB (shortness of breath) R06.02       Past Medical History:        Diagnosis Date    Coronary artery disease     COVID     Primary hypertension     Type 2 diabetes mellitus ([facility] [address]        Past Surgical History:        Procedure Laterality Date    CARDIAC SURGERY      CATARACT REMOVAL Bilateral      SECTION      3 times    CORONARY ANGIOPLASTY WITH STENT PLACEMENT      stenting twice    EYE SURGERY      HIATAL HERNIA REPAIR late 36s    HYSTERECTOMY, TOTAL ABDOMINAL      in 1990s    LARYNGOSCOPY N/A 3/22/2022    SUSPENSON LARYNGOSCOPY WITH JET VENT, DILATION performed by Deepa Sainz MD at [facility]      in 1990s       Social History:    Social History     Tobacco Use    Smoking status: Never Smoker    Smokeless tobacco: Never Used   Substance Use Topics    Alcohol use: Not Currently     Comment: drinks 1 glass of wine cooler or wine about 3 times per year                                Counseling given: Not Answered      Vital Signs (Current): There were no vitals filed for this visit.                                            BP Readings from Last 3 Encounters:   03/28/22 130/65   03/23/22 130/66   03/22/22 132/85       NPO Status:                                                                                 BMI:   Wt Readings from Last 3 Encounters:   03/28/22 191 lb 8 oz (86.9 kg)   03/21/22 190 lb 11.2 oz (86.5 kg)   03/09/22 196 lb (88.9 kg)     There is no height or weight on file to calculate BMI.    CBC:   Lab Results   Component Value Date    WBC 11.6 03/28/2022    RBC 4.09 03/28/2022    HGB 11.9 03/28/2022    HCT 36.6 03/28/2022    MCV 89.5 03/28/2022     03/28/2022       CMP:   Lab Results   Component Value Date     03/28/2022    K 4.7 03/28/2022    K 5.0 03/23/2022     03/28/2022    CO2 28 03/28/2022    BUN 12 03/28/2022    CREATININE 0.5 03/28/2022    LABGLOM >90 03/28/2022    GLUCOSE 253 03/28/2022    PROT 7.6 03/18/2022    CALCIUM 8.9 03/28/2022    BILITOT 0.2 03/18/2022    ALKPHOS 101 03/18/2022    AST 16 03/18/2022    ALT 9 03/18/2022       POC Tests:   Recent Labs     03/28/22  1129   POCGLU 288*       Coags: No results found for: PROTIME, INR, APTT    HCG (If Applicable): No results found for: PREGTESTUR, PREGSERUM, HCG, HCGQUANT     ABGs: No results found for: PHART, PO2ART, XAM8QLK, CGM2QKU, BEART, B1BMAXZL     Type & Screen (If Applicable):  No results found for: LABABO, [address]    Drug/Infectious Status (If Applicable):  No results found for: HIV, HEPCAB    COVID-19 Screening (If Applicable):   Lab Results   Component Value Date    COVID19 detected 12/17/2021           Anesthesia Evaluation  Patient summary reviewed no history of anesthetic complications:   Airway: Mallampati: II  TM distance: >3 FB   Neck ROM: full  Mouth opening: > = 3 FB Dental: normal exam         Pulmonary:   (+) pneumonia:  decreased breath sounds,                             Cardiovascular:    (+) hypertension:, CAD:,       ECG reviewed                        Neuro/Psych:   (+) neuromuscular disease:,             GI/Hepatic/Renal:             Endo/Other:    (+) Diabetes, . Abdominal:             Vascular: Other Findings:               Anesthesia Plan      general and TIVA     ASA 3     (Jet ventilation)  Induction: intravenous. MIPS: Postoperative opioids intended and Prophylactic antiemetics administered. Anesthetic plan and risks discussed with patient and spouse.       Plan discussed with CRNA and surgical team.                  Renea Peng MD   3/28/2022

## 2022-03-28 NOTE — PLAN OF CARE
Problem: Falls - Risk of:  Goal: Will remain free from falls  Description: Will remain free from falls  3/27/2022 2222 by Too Ontiveros RN  Outcome: Ongoing  Note: No falls this shift. Bed alarm activated. Problem: Falls - Risk of:  Goal: Absence of physical injury  Description: Absence of physical injury  3/27/2022 2222 by Too Ontiveros RN  Outcome: Ongoing  Note: No physical injury occurred      Problem: Daily Care:  Goal: Daily care needs are met  Description: Daily care needs are met  3/27/2022 2222 by Too Ontiveros RN  Outcome: Ongoing  Note: Daily care needs are met. Problem: Skin Integrity:  Goal: Skin integrity will stabilize  Description: Skin integrity will stabilize  3/27/2022 2222 by Too Ontiveros RN  Outcome: Ongoing  Note: No skin issues     Problem: Discharge Planning:  Goal: Patients continuum of care needs are met  Description: Patients continuum of care needs are met  3/27/2022 2222 by Too Ontiveros RN  Outcome: Ongoing  Note: Remains on stepdown unit. Care plan reviewed with patient. Patient verbalize understanding of the plan of care and contribute to goal setting. Problem: OXYGENATION/RESPIRATORY FUNCTION  Goal: Patient will maintain patent airway  3/27/2022 2222 by Too Ontiveros RN  Outcome: Ongoing  Note: Scope tomorrow with ENT. O2 levels stable. Airway clear. Voice raspy.      Problem: OXYGENATION/RESPIRATORY FUNCTION  Goal: Patient will achieve/maintain normal respiratory rate/effort  Description: Respiratory rate and effort will be within normal limits for the patient  3/27/2022 2222 by Too Ontiveros RN  Outcome: Ongoing  Note: RR WNL     Problem: Pain:  Goal: Patient's pain/discomfort is manageable  Description: Patient's pain/discomfort is manageable  3/27/2022 2222 by Too Ontiveros RN  Outcome: Completed  Note: No pain

## 2022-03-28 NOTE — PLAN OF CARE
Problem: Falls - Risk of:  Goal: Will remain free from falls  Description: Will remain free from falls  3/28/2022 1148 by Sweta Bradford RN  Outcome: Ongoing  Note: Fall risk identification band in place. Will continue to monitor and assess. Problem: Skin Integrity:  Goal: Skin integrity will stabilize  Description: Skin integrity will stabilize  3/28/2022 1148 by Sweta Bradford RN  Outcome: Ongoing  Note: No new signs of injury or skin breakdown noted. Hourly rounding performed. Skin integrity, elimination, circulation assessed assessed hourly. Will continue to monitor. Problem: Discharge Planning:  Goal: Patients continuum of care needs are met  Description: Patients continuum of care needs are met  3/28/2022 1148 by Sweta Bradford RN  Outcome: Ongoing  Note: Communication with treatment team and patient about discharge plan and appropriate resources.

## 2022-03-28 NOTE — H&P
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  Acute dyspnea and stridor  No new symptoms    Past Medical History:   Diagnosis Date    Coronary artery disease     COVID     Primary hypertension     Type 2 diabetes mellitus ([address] 2018       Past Surgical History:   Procedure Laterality Date    CARDIAC SURGERY      CATARACT REMOVAL Bilateral      SECTION      3 times    CORONARY ANGIOPLASTY WITH STENT PLACEMENT      stenting twice    EYE SURGERY      HIATAL HERNIA REPAIR      late     HYSTERECTOMY, TOTAL ABDOMINAL      in     LARYNGOSCOPY N/A 3/22/2022    SUSPENSON LARYNGOSCOPY WITH JET VENT, DILATION performed by Paco Finley MD at [facility]      in        Allergies   Allergen Reactions    Metformin And Related Other (See Comments)     diarrhea    Codeine Nausea And Vomiting    Meperidine Hcl Nausea And Vomiting    Sulfa Antibiotics Nausea And Vomiting    Sumatriptan Nausea And Vomiting       Current Facility-Administered Medications   Medication Dose Route Frequency Provider Last Rate Last Admin    insulin glargine (LANTUS) injection vial 15 Units  15 Units SubCUTAneous Nightly Martina Benton V, DO        sodium chloride flush 0.9 % injection 5-40 mL  5-40 mL IntraVENous 2 times per day Martina Benton V, DO   5 mL at 22 0848    sodium chloride flush 0.9 % injection 5-40 mL  5-40 mL IntraVENous PRN Martina Benton V, DO        0.9 % sodium chloride infusion  25 mL IntraVENous PRN Martina Benton V, DO        ondansetron (ZOFRAN-ODT) disintegrating tablet 4 mg  4 mg Oral Q8H PRN Martina Benton V, DO        Or    ondansetron (ZOFRAN) injection 4 mg  4 mg IntraVENous Q6H PRN Martina Benton V, DO        polyethylene glycol (GLYCOLAX) packet 17 g  17 g Oral Daily PRN Martina Benton V, DO        acetaminophen (TYLENOL) tablet 650 mg  650 mg Oral Q6H PRN Martina Benton V, DO        Or    acetaminophen (TYLENOL) suppository 650 mg  650 mg Rectal Q6H PRN Debroah Stagers, DO        ipratropium-albuterol (DUONEB) nebulizer solution 1 ampule  1 ampule Inhalation Q4H WA Martina Benton V, DO   1 ampule at 03/28/22 0854    racepinephrine HCl (VAPONEFPRIN) 2.25 % nebulizer solution NEBU 11.25 mg  11.25 mg Nebulization Q4H PRN Martina Benton V, DO        sodium chloride nebulizer 0.9 % solution 3 mL  3 mL Nebulization Q4H PRN Martina Benton V, DO        dexamethasone (DECADRON) injection 8 mg  8 mg IntraVENous Daily Martina Benton V, DO   8 mg at 03/28/22 0859    [Held by provider] aspirin chewable tablet 81 mg  81 mg Oral Daily Martina Benton V, DO        diclofenac sodium (VOLTAREN) 1 % gel 2 g  2 g Topical 4x Daily PRN Martina Benton V, DO        famotidine (PEPCID) tablet 20 mg  20 mg Oral BID Martina Benton V, DO   20 mg at 03/28/22 0847    furosemide (LASIX) tablet 40 mg  40 mg Oral Daily Martina Benton V, DO   40 mg at 03/28/22 0847    hydrOXYzine (VISTARIL) capsule 25 mg  25 mg Oral 4x Daily PRN Martina Benton V, DO        melatonin tablet 6 mg  6 mg Oral Nightly Martina Benton V, DO   6 mg at 03/27/22 2120    nitroGLYCERIN (NITROSTAT) SL tablet 0.4 mg  0.4 mg SubLINGual Q5 Min PRN Martina Benton V, DO        polycarbophil (FIBERCON) tablet 625 mg  625 mg Oral Daily Martina Benton V, DO   625 mg at 03/28/22 0933    rosuvastatin (CRESTOR) tablet 10 mg  10 mg Oral Nightly Martina Benton V, DO   10 mg at 03/27/22 2120    senna (SENOKOT) tablet 8.6 mg  1 tablet Oral BID PRN [facility] Stagers, DO        guaiFENesin [facility]) extended release tablet 600 mg  600 mg Oral BID Martina Benton V, DO   600 mg at 03/28/22 0847    metoprolol succinate (TOPROL XL) extended release tablet 25 mg  25 mg Oral Daily Martina Benton V, DO   25 mg at 03/28/22 0933    insulin lispro (HUMALOG) injection vial 0-12 Units  0-12 Units SubCUTAneous TID WC Martina Benton V, DO   6 Units at 03/28/22 0926    insulin lispro (HUMALOG) injection vial 0-6 Units  0-6 Units SubCUTAneous Nightly Martina Ebnton V, DO   3 Units at 03/27/22 2126    glucagon (rDNA) injection 1 mg  1 mg IntraMUSCular PRN Martina Benton V, DO        dextrose 5 % solution  100 mL/hr IntraVENous PRN Martina Benton V, DO        glucose chewable tablet 4 each  4 tablet Oral PRN Martina Benton V, DO        dextrose bolus (hypoglycemia) 10% 125 mL  125 mL IntraVENous PRN Martina Benton V, DO        Or    dextrose bolus (hypoglycemia) 10% 250 mL  250 mL IntraVENous PRN Martina Benton V, DO        [Held by provider] dextrose 5 % in lactated ringers infusion   IntraVENous Continuous Naye Madrigal MD           Current vitals  /66   Pulse 65   Temp 98.2 °F (36.8 °C) (Oral)   Resp 18   Ht 5' 2\" (1.575 m)   Wt 191 lb 8 oz (86.9 kg)   LMP  (LMP Unknown)   SpO2 94%   BMI 35.03 kg/m²     Proceed with original surgical plan:   Suspension microlaryngoscopy with balloon dilation of the glottis using jet ventilation    Electronically signed by WYATT Valencia CNP on 3/28/2022 at 10:01 AM      -----------------------------------------  -----------------------------------------    Department of Otolaryngology  Consult Note     Reason for Consult:  Dyspnea, stridor  Requesting Physician:  Dr Zev Gifford:  \"I can't breathe\"     History Obtained From:  patient, electronic medical record     HISTORY OF PRESENT ILLNESS:                 The patient is a 73 y.o. female who was admitted to [facility] late last evening. She is known to our service - s/p suspension microlaryngosocpy with balloon dilation and laryngeal steroid injection secondary to glottic stenosis 3/22/2022 with Dr Shannan Giraldo and Dr Francine Castaneda. Patient was discharged from hospital on 3/23/2022 with significant improvement in her breathing. Patient reports that she continues on supplemental oxygen at home. She reports progressive shortness of breath and stridor over the weekend.   She states that she truly felt she could not breathe and was not sure if she would live while traveling to the ER last evening.     Patient has history of hospitalization here at [address] [place] [address] from 2021-2022 for severe COVID-19 infection with respiratory failure. She failed HFNC and BiPAP progressing to need for endotracheal intubation on 2021 secondary to severe ARDS. She was extubated on 2022. Patient reports hoarse voice following extubation. She was transferred to inpatient rehab from following acute hospital stay. She was discharged on supplemental oxygen.   She had ER visit on 3/9/2022 for shortness of breath, then presented again on 3/18/2022 for shortness of breath with admission.       Past Medical History:    Past Medical History        Diagnosis Date    Coronary artery disease      COVID      Primary hypertension      Type 2 diabetes mellitus ([facility] [address] 2018            Past Surgical History:    Past Surgical History             Procedure Laterality Date    CARDIAC SURGERY        CATARACT REMOVAL Bilateral      SECTION         3 times    CORONARY ANGIOPLASTY WITH STENT PLACEMENT        stenting twice    EYE SURGERY        HIATAL HERNIA REPAIR         late 36s    HYSTERECTOMY, TOTAL ABDOMINAL         in 36s    LARYNGOSCOPY N/A 3/22/2022     SUSPENSON LARYNGOSCOPY WITH JET VENT, DILATION performed by Kendy Holcomb MD at [facility]         in             Current Medications:     Current Hospital Medications   Current Facility-Administered Medications: insulin glargine (LANTUS) injection vial 15 Units, 15 Units, SubCUTAneous, Nightly  sodium chloride flush 0.9 % injection 5-40 mL, 5-40 mL, IntraVENous, 2 times per day  sodium chloride flush 0.9 % injection 5-40 mL, 5-40 mL, IntraVENous, PRN  0.9 % sodium chloride infusion, 25 mL, IntraVENous, PRN  ondansetron (ZOFRAN-ODT) disintegrating tablet 4 mg, 4 mg, Oral, Q8H PRN **OR** ondansetron (ZOFRAN) injection 4 mg, 4 mg, IntraVENous, Q6H PRN  polyethylene glycol (GLYCOLAX) packet 17 g, 17 g, Oral, Daily PRN  acetaminophen (TYLENOL) tablet 650 mg, 650 mg, Oral, Q6H PRN **OR** acetaminophen (TYLENOL) suppository 650 mg, 650 mg, Rectal, Q6H PRN  ipratropium-albuterol (DUONEB) nebulizer solution 1 ampule, 1 ampule, Inhalation, Q4H WA  racepinephrine HCl (VAPONEFPRIN) 2.25 % nebulizer solution NEBU 11.25 mg, 11.25 mg, Nebulization, Q4H PRN  sodium chloride nebulizer 0.9 % solution 3 mL, 3 mL, Nebulization, Q4H PRN  dexamethasone (DECADRON) injection 8 mg, 8 mg, IntraVENous, Daily  [Held by provider] aspirin chewable tablet 81 mg, 81 mg, Oral, Daily  diclofenac sodium (VOLTAREN) 1 % gel 2 g, 2 g, Topical, 4x Daily PRN  famotidine (PEPCID) tablet 20 mg, 20 mg, Oral, BID  furosemide (LASIX) tablet 40 mg, 40 mg, Oral, Daily  hydrOXYzine (VISTARIL) capsule 25 mg, 25 mg, Oral, 4x Daily PRN  melatonin tablet 6 mg, 6 mg, Oral, Nightly  nitroGLYCERIN (NITROSTAT) SL tablet 0.4 mg, 0.4 mg, SubLINGual, Q5 Min PRN  polycarbophil (FIBERCON) tablet 625 mg, 625 mg, Oral, Daily  rosuvastatin (CRESTOR) tablet 10 mg, 10 mg, Oral, Nightly  senna (SENOKOT) tablet 8.6 mg, 1 tablet, Oral, BID PRN  guaiFENesin (MUCINEX) extended release tablet 600 mg, 600 mg, Oral, BID  metoprolol succinate (TOPROL XL) extended release tablet 25 mg, 25 mg, Oral, Daily  insulin lispro (HUMALOG) injection vial 0-12 Units, 0-12 Units, SubCUTAneous, TID WC  insulin lispro (HUMALOG) injection vial 0-6 Units, 0-6 Units, SubCUTAneous, Nightly  glucagon (rDNA) injection 1 mg, 1 mg, IntraMUSCular, PRN  dextrose 5 % solution, 100 mL/hr, IntraVENous, PRN  glucose chewable tablet 4 each, 4 tablet, Oral, PRN  dextrose bolus (hypoglycemia) 10% 125 mL, 125 mL, IntraVENous, PRN **OR** dextrose bolus (hypoglycemia) 10% 250 mL, 250 mL, IntraVENous, PRN  [Held by provider] dextrose 5 % in lactated ringers infusion, , IntraVENous, Continuous        Allergies:  Review of patient's allergies indicates no known allergies.     Social History:    TOBACCO:   reports that she has never smoked. She has never used smokeless tobacco.  ETOH:   reports previous alcohol use. DRUGS:   reports no history of drug use.     Family History:   Family History             Problem Relation Age of Onset    Parkinson's Disease Father      Lung Cancer Father              REVIEW OF SYSTEMS:    A complete multi-organ review of systems was performed using a new patient questionnaire, and reviewed by me. ENT:  negative except as noted in HPI  CONSTITUTIONAL:  negative except as noted in HPI  EYES:  negative except as noted in HPI  RESPIRATORY:  negative except as noted in HPI  CARDIOVASCULAR:  negative except as noted in HPI  GASTROINTESTINAL:  negative except as noted in HPI  GENITOURINARY:  negative except as noted in HPI  MUSCULOSKELETAL:  negative except as noted in HPI  SKIN:  negative except as noted in HPI  ENDOCRINE/METABOLIC: negative except as noted in HPI  HEMATOLOGIC/LYMPHATIC:  negative except as noted in HPI  ALLERGY/IMMUN: negative except as noted in HPI  NEUROLOGICAL:  negative except as noted in HPI  BEHAVIOR/PSYCH:  negative except as noted in HPI     PHYSICAL EXAM:    VITALS:  /66   Pulse 65   Temp 98.2 °F (36.8 °C) (Oral)   Resp 18   Ht 5' 2\" (1.575 m)   Wt 191 lb 8 oz (86.9 kg)   LMP  (LMP Unknown)   SpO2 94%   BMI 35.03 kg/m²      Alert, oriented and obese older adult female in mild distress. Sitting in bedside chair with supplemental oxygen per nasal cannula. + non-productive cough. Breathy vocal quality with mild hoarseness (able to speak in full sentence before obviously running out of breath support). Audible inspiratory stridor.   Dr Butch Luna at bedside on my arrival post flexible laryngoscopy.     DATA:    CBC with Differential:          Lab Results   Component Value Date     WBC 11.6 03/28/2022     RBC 4.09 03/28/2022     HGB 11.9 03/28/2022     HCT 36.6 03/28/2022      03/28/2022     MCV 89.5 03/28/2022     MCH 29.1 03/28/2022     MCHC 32.5 03/28/2022     NRBC 0 03/27/2022     SEGSPCT 65.9 03/27/2022     MONOPCT 7.6 03/27/2022     MONOSABS 1.1 03/27/2022     LYMPHSABS 3.2 03/27/2022     EOSABS 0.1 03/27/2022     BASOSABS 0.1 03/27/2022      BMP:          Lab Results   Component Value Date      03/28/2022     K 4.7 03/28/2022     K 5.0 03/23/2022      03/28/2022     CO2 28 03/28/2022     BUN 12 03/28/2022     LABALBU 4.3 03/18/2022     CREATININE 0.5 03/28/2022     CALCIUM 8.9 03/28/2022     LABGLOM >90 03/28/2022     GLUCOSE 253 03/28/2022         IMPRESSION/RECOMMENDATIONS:       60-year-old female with posterior glottic stenosis secondary to prolonged endotracheal intubation currently stable on supplemental oxygen per nasal cannula but with subjective dyspnea and inspiratory stridor. Flexible laryngoscopy at bedside limited, but appears as though both TVCs are medial with minimal movement/opening. Patient would benefit from suspension microlaryngoscopy with balloon dilation of the glottis as temporizing measure.     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 NPO. Continue supplemental O2; consider HFNC if desaturates or subjectively worse.  Continue steroids and nebs.     Management of patient's care was collaborated with Dr Cat Nunez today.        Electronically signed by WYATT Pressley CNP on 3/28/2022 at 9:33 AM

## 2022-03-29 LAB
ANION GAP SERPL CALCULATED.3IONS-SCNC: 12 MEQ/L (ref 8–16)
BUN BLDV-MCNC: 16 MG/DL (ref 7–22)
CALCIUM SERPL-MCNC: 9.5 MG/DL (ref 8.5–10.5)
CHLORIDE BLD-SCNC: 100 MEQ/L (ref 98–111)
CO2: 23 MEQ/L (ref 23–33)
CREAT SERPL-MCNC: 0.5 MG/DL (ref 0.4–1.2)
ERYTHROCYTE [DISTWIDTH] IN BLOOD BY AUTOMATED COUNT: 12.7 % (ref 11.5–14.5)
ERYTHROCYTE [DISTWIDTH] IN BLOOD BY AUTOMATED COUNT: 42.4 FL (ref 35–45)
GFR SERPL CREATININE-BSD FRML MDRD: > 90 ML/MIN/1.73M2
GLUCOSE BLD-MCNC: 168 MG/DL (ref 70–108)
GLUCOSE BLD-MCNC: 276 MG/DL (ref 70–108)
GLUCOSE BLD-MCNC: 279 MG/DL (ref 70–108)
GLUCOSE BLD-MCNC: 299 MG/DL (ref 70–108)
GLUCOSE BLD-MCNC: 343 MG/DL (ref 70–108)
HCT VFR BLD CALC: 43.8 % (ref 37–47)
HEMOGLOBIN: 14 GM/DL (ref 12–16)
MCH RBC QN AUTO: 29.2 PG (ref 26–33)
MCHC RBC AUTO-ENTMCNC: 32 GM/DL (ref 32.2–35.5)
MCV RBC AUTO: 91.3 FL (ref 81–99)
PLATELET # BLD: 231 THOU/MM3 (ref 130–400)
PMV BLD AUTO: 10.3 FL (ref 9.4–12.4)
POTASSIUM SERPL-SCNC: 4.4 MEQ/L (ref 3.5–5.2)
RBC # BLD: 4.8 MILL/MM3 (ref 4.2–5.4)
SODIUM BLD-SCNC: 135 MEQ/L (ref 135–145)
WBC # BLD: 19.9 THOU/MM3 (ref 4.8–10.8)

## 2022-03-29 PROCEDURE — 6370000000 HC RX 637 (ALT 250 FOR IP): Performed by: STUDENT IN AN ORGANIZED HEALTH CARE EDUCATION/TRAINING PROGRAM

## 2022-03-29 PROCEDURE — 85027 COMPLETE CBC AUTOMATED: CPT

## 2022-03-29 PROCEDURE — 82948 REAGENT STRIP/BLOOD GLUCOSE: CPT

## 2022-03-29 PROCEDURE — 2580000003 HC RX 258: Performed by: INTERNAL MEDICINE

## 2022-03-29 PROCEDURE — 94640 AIRWAY INHALATION TREATMENT: CPT

## 2022-03-29 PROCEDURE — 2060000000 HC ICU INTERMEDIATE R&B

## 2022-03-29 PROCEDURE — 6360000002 HC RX W HCPCS: Performed by: INTERNAL MEDICINE

## 2022-03-29 PROCEDURE — 6360000002 HC RX W HCPCS: Performed by: NURSE PRACTITIONER

## 2022-03-29 PROCEDURE — 6370000000 HC RX 637 (ALT 250 FOR IP): Performed by: INTERNAL MEDICINE

## 2022-03-29 PROCEDURE — 2700000000 HC OXYGEN THERAPY PER DAY

## 2022-03-29 PROCEDURE — 2580000003 HC RX 258: Performed by: NURSE PRACTITIONER

## 2022-03-29 PROCEDURE — 97530 THERAPEUTIC ACTIVITIES: CPT

## 2022-03-29 PROCEDURE — 94761 N-INVAS EAR/PLS OXIMETRY MLT: CPT

## 2022-03-29 PROCEDURE — 2580000003 HC RX 258: Performed by: STUDENT IN AN ORGANIZED HEALTH CARE EDUCATION/TRAINING PROGRAM

## 2022-03-29 PROCEDURE — 97166 OT EVAL MOD COMPLEX 45 MIN: CPT

## 2022-03-29 PROCEDURE — 99232 SBSQ HOSP IP/OBS MODERATE 35: CPT | Performed by: INTERNAL MEDICINE

## 2022-03-29 PROCEDURE — 80048 BASIC METABOLIC PNL TOTAL CA: CPT

## 2022-03-29 PROCEDURE — 97162 PT EVAL MOD COMPLEX 30 MIN: CPT

## 2022-03-29 PROCEDURE — 97535 SELF CARE MNGMENT TRAINING: CPT

## 2022-03-29 RX ORDER — SODIUM CHLORIDE, SODIUM LACTATE, POTASSIUM CHLORIDE, CALCIUM CHLORIDE 600; 310; 30; 20 MG/100ML; MG/100ML; MG/100ML; MG/100ML
INJECTION, SOLUTION INTRAVENOUS CONTINUOUS
Status: DISCONTINUED | OUTPATIENT
Start: 2022-03-29 | End: 2022-03-31

## 2022-03-29 RX ORDER — INSULIN GLARGINE 100 [IU]/ML
20 INJECTION, SOLUTION SUBCUTANEOUS NIGHTLY
Status: DISCONTINUED | OUTPATIENT
Start: 2022-03-30 | End: 2022-03-30

## 2022-03-29 RX ORDER — INSULIN GLARGINE 100 [IU]/ML
15 INJECTION, SOLUTION SUBCUTANEOUS 2 TIMES DAILY
Status: DISCONTINUED | OUTPATIENT
Start: 2022-03-29 | End: 2022-03-29

## 2022-03-29 RX ADMIN — DEXAMETHASONE SODIUM PHOSPHATE 8 MG: 4 INJECTION, SOLUTION INTRA-ARTICULAR; INTRALESIONAL; INTRAMUSCULAR; INTRAVENOUS; SOFT TISSUE at 01:58

## 2022-03-29 RX ADMIN — ROSUVASTATIN CALCIUM 10 MG: 10 TABLET, FILM COATED ORAL at 21:56

## 2022-03-29 RX ADMIN — AMPICILLIN SODIUM AND SULBACTAM SODIUM 3000 MG: 1; .5 INJECTION, POWDER, FOR SOLUTION INTRAMUSCULAR; INTRAVENOUS at 03:28

## 2022-03-29 RX ADMIN — DEXAMETHASONE SODIUM PHOSPHATE 8 MG: 4 INJECTION, SOLUTION INTRA-ARTICULAR; INTRALESIONAL; INTRAMUSCULAR; INTRAVENOUS; SOFT TISSUE at 08:32

## 2022-03-29 RX ADMIN — AMPICILLIN SODIUM AND SULBACTAM SODIUM 3000 MG: 1; .5 INJECTION, POWDER, FOR SOLUTION INTRAMUSCULAR; INTRAVENOUS at 15:58

## 2022-03-29 RX ADMIN — GUAIFENESIN 600 MG: 600 TABLET, EXTENDED RELEASE ORAL at 11:30

## 2022-03-29 RX ADMIN — INSULIN LISPRO 1 UNITS: 100 INJECTION, SOLUTION INTRAVENOUS; SUBCUTANEOUS at 16:07

## 2022-03-29 RX ADMIN — AMPICILLIN SODIUM AND SULBACTAM SODIUM 3000 MG: 1; .5 INJECTION, POWDER, FOR SOLUTION INTRAMUSCULAR; INTRAVENOUS at 08:32

## 2022-03-29 RX ADMIN — INSULIN LISPRO 2 UNITS: 100 INJECTION, SOLUTION INTRAVENOUS; SUBCUTANEOUS at 22:02

## 2022-03-29 RX ADMIN — IPRATROPIUM BROMIDE AND ALBUTEROL SULFATE 1 AMPULE: .5; 3 SOLUTION RESPIRATORY (INHALATION) at 20:59

## 2022-03-29 RX ADMIN — INSULIN GLARGINE 15 UNITS: 100 INJECTION, SOLUTION SUBCUTANEOUS at 08:45

## 2022-03-29 RX ADMIN — BUDESONIDE 500 MCG: 0.5 INHALANT RESPIRATORY (INHALATION) at 08:08

## 2022-03-29 RX ADMIN — INSULIN LISPRO 12 UNITS: 100 INJECTION, SOLUTION INTRAVENOUS; SUBCUTANEOUS at 11:38

## 2022-03-29 RX ADMIN — BUDESONIDE 500 MCG: 0.5 INHALANT RESPIRATORY (INHALATION) at 21:00

## 2022-03-29 RX ADMIN — FUROSEMIDE 40 MG: 40 TABLET ORAL at 11:28

## 2022-03-29 RX ADMIN — IPRATROPIUM BROMIDE AND ALBUTEROL SULFATE 1 AMPULE: .5; 3 SOLUTION RESPIRATORY (INHALATION) at 06:44

## 2022-03-29 RX ADMIN — BUSPIRONE HYDROCHLORIDE 10 MG: 10 TABLET ORAL at 15:59

## 2022-03-29 RX ADMIN — INSULIN LISPRO 9 UNITS: 100 INJECTION, SOLUTION INTRAVENOUS; SUBCUTANEOUS at 08:42

## 2022-03-29 RX ADMIN — METOPROLOL SUCCINATE 25 MG: 25 TABLET, FILM COATED, EXTENDED RELEASE ORAL at 11:28

## 2022-03-29 RX ADMIN — IPRATROPIUM BROMIDE AND ALBUTEROL SULFATE 1 AMPULE: .5; 3 SOLUTION RESPIRATORY (INHALATION) at 13:40

## 2022-03-29 RX ADMIN — FAMOTIDINE 20 MG: 20 TABLET ORAL at 21:56

## 2022-03-29 RX ADMIN — CALCIUM POLYCARBOPHIL 625 MG: 625 TABLET, FILM COATED ORAL at 11:29

## 2022-03-29 RX ADMIN — AMPICILLIN SODIUM AND SULBACTAM SODIUM 3000 MG: 1; .5 INJECTION, POWDER, FOR SOLUTION INTRAMUSCULAR; INTRAVENOUS at 21:47

## 2022-03-29 RX ADMIN — HYDROXYZINE PAMOATE 25 MG: 25 CAPSULE ORAL at 06:36

## 2022-03-29 RX ADMIN — IPRATROPIUM BROMIDE AND ALBUTEROL SULFATE 1 AMPULE: .5; 3 SOLUTION RESPIRATORY (INHALATION) at 17:15

## 2022-03-29 RX ADMIN — GUAIFENESIN 600 MG: 600 TABLET, EXTENDED RELEASE ORAL at 21:56

## 2022-03-29 RX ADMIN — SODIUM CHLORIDE, PRESERVATIVE FREE 10 ML: 5 INJECTION INTRAVENOUS at 08:37

## 2022-03-29 RX ADMIN — Medication 6 MG: at 21:56

## 2022-03-29 RX ADMIN — INSULIN LISPRO 5 UNITS: 100 INJECTION, SOLUTION INTRAVENOUS; SUBCUTANEOUS at 16:06

## 2022-03-29 RX ADMIN — SODIUM CHLORIDE, POTASSIUM CHLORIDE, SODIUM LACTATE AND CALCIUM CHLORIDE: 600; 310; 30; 20 INJECTION, SOLUTION INTRAVENOUS at 09:45

## 2022-03-29 RX ADMIN — DEXAMETHASONE SODIUM PHOSPHATE 8 MG: 4 INJECTION, SOLUTION INTRA-ARTICULAR; INTRALESIONAL; INTRAMUSCULAR; INTRAVENOUS; SOFT TISSUE at 18:59

## 2022-03-29 RX ADMIN — BUSPIRONE HYDROCHLORIDE 10 MG: 10 TABLET ORAL at 11:28

## 2022-03-29 RX ADMIN — BUSPIRONE HYDROCHLORIDE 10 MG: 10 TABLET ORAL at 21:56

## 2022-03-29 RX ADMIN — FAMOTIDINE 20 MG: 20 TABLET ORAL at 08:37

## 2022-03-29 RX ADMIN — ACETAMINOPHEN 650 MG: 325 TABLET ORAL at 00:51

## 2022-03-29 ASSESSMENT — PAIN DESCRIPTION - PAIN TYPE: TYPE: ACUTE PAIN

## 2022-03-29 ASSESSMENT — PAIN DESCRIPTION - PROGRESSION: CLINICAL_PROGRESSION: GRADUALLY WORSENING

## 2022-03-29 ASSESSMENT — PAIN DESCRIPTION - DESCRIPTORS: DESCRIPTORS: DULL

## 2022-03-29 ASSESSMENT — PAIN DESCRIPTION - ONSET: ONSET: GRADUAL

## 2022-03-29 ASSESSMENT — PAIN SCALES - GENERAL
PAINLEVEL_OUTOF10: 4
PAINLEVEL_OUTOF10: 0

## 2022-03-29 ASSESSMENT — PAIN DESCRIPTION - FREQUENCY: FREQUENCY: INTERMITTENT

## 2022-03-29 ASSESSMENT — PAIN DESCRIPTION - LOCATION: LOCATION: THROAT

## 2022-03-29 NOTE — ANESTHESIA POSTPROCEDURE EVALUATION
Department of Anesthesiology  Postprocedure Note    Patient: Kina Avery  MRN: [id number]  YOB: 1950  Date of evaluation: 3/28/2022  Time:  9:57 PM     Procedure Summary     Date: 03/28/22 Room / Location: [facility] OR  / [facility] OR    Anesthesia Start: 1213 Anesthesia Stop: 6700    Procedure: SUSPENSION MICROLARYNGOSCOPY WITH BALLOON DILATION AND JET VENT, KENALOG INJECTION (N/A ) Diagnosis: (GLOTTIC STENOSIS, SOB)    Surgeons: Tillie Bamberger, MD Responsible Provider: Maribell Redding MD    Anesthesia Type: general, TIVA ASA Status: 3          Anesthesia Type: general, TIVA    Patricia Phase I: Patricia Score: 8    Patricia Phase II:      Last vitals: Reviewed and per EMR flowsheets. Anesthesia Post Evaluation    Patient location during evaluation: PACU  Patient participation: complete - patient participated  Level of consciousness: awake and alert  Airway patency: patent  Nausea & Vomiting: no nausea and no vomiting  Complications: no  Cardiovascular status: hemodynamically stable  Respiratory status: acceptable  Hydration status: euvolemic      [facility]  POST-ANESTHESIA NOTE       Name:  Kina Avery                                         Age:  73 y.o.   MRN:  [id number]      Last Vitals:  /77   Pulse 67   Temp 97.9 °F (36.6 °C) (Oral)   Resp 20   Ht 5' 2\" (1.575 m)   Wt 191 lb 8 oz (86.9 kg)   LMP  (LMP Unknown)   SpO2 100%   BMI 35.03 kg/m²   Patient Vitals for the past 4 hrs:   BP Temp Temp src Pulse Resp SpO2   03/28/22 2104 121/77 97.9 °F (36.6 °C) Oral 67 20 100 %   03/28/22 1856      99 %       Level of Consciousness:  Awake    Respiratory:  Stable    Oxygen Saturation:  Stable    Cardiovascular:  Stable    Hydration:  Adequate    PONV:  Stable    Post-op Pain:  Adequate analgesia    Post-op Assessment:  No apparent anesthetic complications    Additional Follow-Up / Treatment / Comment:  None    Renea Peng MD  March 28, 2022   9:57 PM

## 2022-03-29 NOTE — CARE COORDINATION
03/29/22 1046   Readmission Assessment   Number of Days since last admission? 1-7 days   Previous Disposition Home with Family   Who is being Interviewed Patient;Caregiver   What was the patient's/caregiver's perception as to why they think they needed to return back to the hospital? Other (Comment)  (Symptoms returned)   Did you visit your Primary Care Physician after you left the hospital, before you returned this time? Yes  (Phone visit)   Did you see a specialist, such as Cardiac, Pulmonary, Orthopedic Physician, etc. after you left the hospital? No   Who advised the patient to return to the hospital? Self-referral;Caregiver   Does the patient report anything that got in the way of taking their medications? No   In our efforts to provide the best possible care to you and others like you, can you think of anything that we could have done to help you after you left the hospital the first time, so that you might not have needed to return so soon?  Other (Comment)  (\"I don't think so\")

## 2022-03-29 NOTE — PROGRESS NOTES
[address] [address] 62  INPATIENT OCCUPATIONAL THERAPY  [facility] CVICU 4B  EVALUATION    Time:   Time In: 6860  Time Out: 1021  Timed Code Treatment Minutes: 28 Minutes  Minutes: 36          Date: 3/29/2022  Patient Name: Fauzia Voss,   Gender: female      MRN: [id number]  : 1950  (73 y.o.)  Referring Practitioner: Pamela Rizo DO  Diagnosis: Shortness of breath  Additional Pertinent Hx: The patient is a 73 y.o. female who was admitted to [facility] late last evening. She is known to our service - s/p suspension microlaryngosocpy with balloon dilation and laryngeal steroid injection secondary to glottic stenosis 3/22/2022 with Dr Laurie Zeng and Dr Charisma Malhotra. Patient was discharged from hospital on 3/23/2022 with significant improvement in her breathing. Patient reports that she continues on supplemental oxygen at home. She reports progressive shortness of breath and stridor over the weekend. She states that she truly felt she could not breathe and was not sure if she would live while traveling to the ER last evening. Patient has history of hospitalization here at [address] [place] [address] from 2021-2022 for severe COVID-19 infection with respiratory failure. She failed HFNC and BiPAP progressing to need for endotracheal intubation on 2021 secondary to severe ARDS. She was extubated on 2022. Patient reports hoarse voice following extubation. She was transferred to inpatient rehab from following acute hospital stay. She was discharged on supplemental oxygen. She had ER visit on 3/9/2022 for shortness of breath, then presented again on 3/18/2022 for shortness of breath with admission. Restrictions/Precautions:  Restrictions/Precautions: Fall Risk    Subjective  Chart Reviewed: Ricky Damian and Physical  Patient assessed for rehabilitation services?: Yes    Subjective: RN josselined OT session.  Upon arrival patient was sitting up in bed visiting with spouse. Pt very pleasant and cooperative. Comments: 2-4L at home, current 40L FiO2 39%    Pain:  Pain Assessment  Patient Currently in Pain: Denies (scratch throat)    Vitals: Oxygen: Pt on HFNC 40L 39%. Pt maintained O2 stats above 90% throughout session. Increased time to complete d/t decreased activity tolerance and requiring rest breaks. Social/Functional History:  Lives With: Spouse  Type of Home: House  Home Layout: One level,Able to Live on Main level with bedroom/bathroom  Home Access: Stairs to enter without rails  Entrance Stairs - Number of Steps: 2-4inch steps, uses door frame  Home Equipment: Wheelchair-manual (NO AD at home, wheelchair for further distances in community)   Bathroom Shower/Tub: Walk-in shower,Shower chair without back (bench)  Bathroom Toilet: Standard  Bathroom Equipment: Grab bars in shower  Bathroom Accessibility: Accessible    Receives Help From: Family  ADL Assistance: Independent  Homemaking Assistance: Needs assistance  Ambulation Assistance: Independent  Transfer Assistance: Independent    Active : Yes (Patient reports she has driven a few times ~ 5 miles)  Occupation: Retired  Leisure & Hobbies: works in [employer] for 2 hours 2 days a week  Additional Comments: Pt amb without AD    VISION:WFL    HEARING:  WFL    COGNITION: WFL    RANGE OF MOTION:  Bilateral Upper Extremity:  WFL    STRENGTH:  Bilateral Upper Extremity:  WFL    ADL:   Lower Extremity Dressing: Maximum Assistance. socks. Toileting: Contact Guard Assistance. Toilet Transfer: Contact Guard Assistance. BSC. D/t limited by HFNC tubing. *increased time to complete d/t decreased endurance. BALANCE:  Sitting Balance:  Supervision. Standing Balance: Contact Guard Assistance.       BED MOBILITY:  Rolling to Right: Contact Guard Assistance     Supine to Sit: Contact Guard Assistance    Sit to Supine: Contact Guard Assistance    Scooting: Contact Guard Assistance      TRANSFERS:  Sit to Stand: Contact Guard Assistance. Stand to Sit: Contact Guard Assistance. FUNCTIONAL MOBILITY:  Assistive Device: None  Assist Level:  Contact Guard Assistance. Distance: EOB to BSC, BSC to EOB. Slow pace, No LOB. Activity Tolerance:  Patient tolerance of  treatment: good. Assessment:  Assessment: This 73year old female requires skilled OT intervention to increase indep and endurance with all self cares, transfers, mobility, and IADLs while maintaining O2 stats. Without skilled OT intervention pt is at increased risk for caregiver burden and decreased QOL. Performance deficits / Impairments: Decreased ADL status,Decreased endurance,Decreased high-level IADLs,Decreased strength  Prognosis: Good  REQUIRES OT FOLLOW UP: Yes    Treatment Initiated: Treatment and education initiated within context of evaluation. Evaluation time included review of current medical information, gathering information related to past medical, social and functional history, completion of standardized testing, formal and informal observation of tasks, assessment of data and development of plan of care and goals. Treatment time included skilled education and facilitation of tasks to increase safety and independence with ADL's for improved functional independence and quality of life. Discharge Recommendations:  Continue to assess pending progress,Home with assist PRN    Patient Education:  OT Education: OT Role,Plan of Care,Precautions,ADL Adaptive Strategies,Transfer Training,Energy Conservation    Equipment Recommendations:  Equipment Needed: No    Plan:  Times per week: 5x  Current Treatment Recommendations: Strengthening,Functional Mobility Andres Ad Training,Patient/Caregiver Education & Training,Self-Care / ADL,Safety Education & Training. See long-term goal time frame for expected duration of plan of care.   If no long-term goals established, a short length of stay is anticipated. Goals:  Patient goals : Go Home with spouse  Short term goals  Time Frame for Short term goals: Until discharge  Short term goal 1: Pt will complete BUE light resistive exercises with 0 vcs for technique while O2 stats maintain above 90% to increase endurance with self cares. Short term goal 2: Pt will complete BADL routine with S and 0 vcs for safety while demonstrating EC techniques to increase endurance in home environment. Short term goal 3: Pt will complete functional mobility to/from BR and HH distances with S and 0 vcs for safety to increase indep and endurance with self cares. Following session, patient left in safe position with all fall risk precautions in place.

## 2022-03-29 NOTE — PLAN OF CARE
Problem: Falls - Risk of:  Goal: Will remain free from falls  Description: Will remain free from falls  3/29/2022 1009 by Harry Romano RN  Outcome: Ongoing  Note: Fall risk identification band in place. Will continue to monitor and assess. Problem: Pain:  Goal: Pain level will decrease  Description: Pain level will decrease  Outcome: Ongoing  Note: Patient displays improved well-being such as baseline levels for pulse, BP, respirations and relaxed muscle tone or body posture. Monitor using 0-10 pain scale. Problem: Skin Integrity:  Goal: Skin integrity will stabilize  Description: Skin integrity will stabilize  3/29/2022 1009 by Harry Romano RN  Outcome: Ongoing  Note: No new signs of injury or skin breakdown noted. Hourly rounding performed. Skin integrity, elimination, circulation assessed assessed hourly. Will continue to monitor. Problem: Discharge Planning:  Goal: Patients continuum of care needs are met  Description: Patients continuum of care needs are met  Outcome: Ongoing  Note: Communication with treatment team and patient about discharge plan and appropriate resources.

## 2022-03-29 NOTE — PROGRESS NOTES
[address] [address]  SPEECH THERAPY MISSED TREATMENT NOTE  [facility] CVICU 4B      Date: 3/29/2022  Patient Name: Trent Lindo        MRN: [id number]    : 1950  (73 y.o.)    COMMUNICATION NOTE:  ST initially consulted to complete pre-tracheostomy education per Dr. Florencia Malhotra. ST spoke with Oj ARENAS, ENT; ST recommending new speech evaluation order post trachestomy placement to evaluate and coordinate with RT to complete speech production evaluation with evaluation and supportive education related to breath support/coordination and potential PMV placement. Should patient demonstrate with swallowing difficulty ST recommending consideration of clinical swallow evaluation. Patient at high risk for aspiration d/t prolonged intubation,  medialized vocal folds and ongoing need for additional respiratory support.      Nino Councilman, M.S. [address] 23

## 2022-03-29 NOTE — PROGRESS NOTES
Department of Otolaryngology  Subjective:  No acute events overnight. She has been breathing adequately on the high flow nasal oxygen. Towards early morning, she began to feel tighter in her chest.  She was given some medicine for anxiety, which helped. PHYSICAL EXAM:    VITALS:  /72   Pulse 63   Temp 97.7 °F (36.5 °C) (Oral)   Resp 18   Ht 5' 2\" (1.575 m)   Wt 210 lb 9.6 oz (95.5 kg)   LMP  (LMP Unknown)   SpO2 97%   BMI 38.52 kg/m² . On 60 L high flow nasal cannula    ABNORMAL OTOLARYNGOLOGIC EXAM FINDINGS: Mild airflow turbulence with inspiration and expiration while talking. No problem swallowing. Voice remains breathy, weak, and rough. OTHER PERTINENT EXAM FINDINGS:  GENERAL: Awake, NAD, Non-toxic appearing. Patient is alert and oriented toperson, place and time. Not obviously hearing impaired. NEUROLOGICAL: GCS 15, Cranial nerves II-VI, VIII-XII grossly intact,  Facial nerve (VII) w/ House-Brackman Grade 1 of 6 bilaterally, No evidence of nystagmus or gross asymmetry    PSYCHIATRIC: Mood and Affect appropriate. HEAD: NC/AT  SKIN: No overtly ulcerated, cellulitic, or indurated lesions of the head or neck. EARS: Pinna well-formed  NOSE: Dorsum w/o scar or deformity  ORAL CAVITY: No mucosal masses/lesions appreciated,    OROPHARYNX: No mucosal masses or lesions appreciated. NECK: Soft, supple. No crepitus or masses appreciated,  Trachea midline. CHEST: Breathing is mildly labored, but without retraction. With increased respiratory effort, comes increased biphasic stridor. IMPRESSION/RECOMMENDATIONS:      Patient Active Problem List   Diagnosis    COVID-19    Hypoxia    Acute respiratory failure with hypoxia ([address]    Debility    Physical deconditioning    History of COVID-19    Dysarthria    Primary hypertension    Type 2 diabetes mellitus (HCC)    Obesity (BMI 30-39. 9)    History of coronary artery disease    History of coronary angioplasty with insertion of stent    Cardiomyopathy [facility])    Critical illness myopathy    Stridor    Posterior glottic stenosis    Shortness of breath        Uriel Arellano is a 73 y.o. female with:    Glottic stenosis of unclear etiology. She is now postop day 1 from suspension microlaryngoscopy with airway balloon dilation, and steroid injection. Her airway dilated adequately and there appeared to be minimal scarring around her cricoarytenoid complex. I did take a culture of the area on the right-hand side. I performed a flexible nasolaryngoscopy prior to yesterday's surgery, and after yesterday's surgery. There was no appreciable improvement in her glottic aperture. 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 Perhaps there is a component of paroxysmal vocal fold motion as anxiety certainly seems to worsen her breathing, as she has attacks where her breathing is acutely worse that are relieved by anxiolytics. She is also perseverating on the role that Mucinex plays in her breathing. I tried to explain the Mucinex neither helps nor hurts her ability to move her vocal cords. #Respiratory distress  -Continue high-dose steroids  -Continue 60 L high flow nasal cannula  -Continuous pulse oximetry  -Okay for regular (diabetic) diet today.  -I again discussed with them the potential need for tracheostomy to provide an adequate airway and give her vocal folds a chance to begin moving. I do not think her current airway is adequate enough for discharge home. I do not think we will be able to successfully wean her high flow nasal cannula or steroids.           Electronically signed by Vignesh Joseph MD on 3/29/2022 at 8:27 AM

## 2022-03-29 NOTE — PROGRESS NOTES
Hospitalist Progress Note    Patient:  Chung Dennis      Unit/Bed:4B-10/010-A    YOB: 1950    MRN: [id number]       Acct: [de-identified]     PCP: Edith Kamara    Date of Admission: 3/27/2022    Assessment/Plan:    1. Acute on chronic hypoxic respiratory failure: 2/2 Glottic stenosis. Received Decadron 8mg daily, Racepinephrine in ED which O2 Sat improved from 93% -> 100%. SOB was improved. Dr. Robert Combs was contected by ED with recommend admitted and plan for Laryngoscopy (3/28). Suspension microlaryngoscopy with balloon dilation and jet vent, kenalog injection, flexible nasopharyngoscope with Dr. Rupal Talley on 3/28/22.   3/29: ENT consult appreciated - recommend tracheostomy to provide an adequate airway and give her vocal folds a chance to begin moving, patient may need high flow and steroid for a period of time. Patient and family would like to perceived trach tube placement.      2. Glottic stenosis: Stridor on physical exam. S/p suspension microlaryngoscopy with relief of glottic stenosis, airway balloon dilation, and laryngeal steroid injection on 3/22/22. Plan: see 1      3. HTN: /96. Start Metoprolol 25mg daily  3/29: Tolerate Metoprolol. BP stable with 138/69 and HR 81.     4. Hx CAD: s/p PCI 2008. Echo (3/18/22) EF 60%, with normal LV function. Plan: start Metoprolol 25mg daily, cont Crestor, monitor I/O, Weight daily, recommend follow with Card/PCP for GDMT on discharged.     5. NIDDM2: Lantus 20unit, SSI low Humalog, Insulin Regular 5 units with meal, Hypoglycemia protocol     6. GERD: cont Famotidine      7. Insomnia: cont [place]     8. Anxiety: patient is very anxious about couldn't be able to breath. Patient worry that she couldn't be able to make it to hospital next time. She reported that she could feel improving SOB and \"couldn't be able to get air into her lung\" if she could take a deep breath. However, patient couldn't be able to stay calm when she was SOB.  Plan: Start Buspar     9. Leukocytosis: 2/2 steroid. Afebrile last 24 hrs. Expected discharge date:  3/28/22    Disposition:    [x] Home       [] TCU       [] Rehab       [] Psych       [] SNF       [] [place]       [] Other-    Chief Complaint: SOB    Hospital Course: Neyda Aparicio is 73years old female who presented to the hospital for worsening shortness of breath. Past medical history significant with recent hospital admission for glottic stenosis (3/18-23/22), CAD SP PIC, hypertension, diabetes, anxiety. Patient was recently admitted and managed for glottic stenosis with she underwent suspension microlaryngoscopy with relief of glottic stenosis, airway balloon dilation, and laryngeal steroid injection on 3/22/22. Patient was discharged home on 3/24. Since 3/25, patient has increase in mucus production which patient feeling \"something stuck in her throat\". She has been using Intensive spirometry at home which she recall that from yesterday she couldn't be able to get any air into the spirometry. Patient reported SOB has been worsening which she decided to to to ED. Denied any fever, chill, chest pain, yellow cough without sputum production, recent sick contact.     In the ED, patient O2 sat 93% on room air. Patient received Decadron 8 mg and racemic epi which her SOB has been improved and O2 sat 100% on room air. Dr. Barry Carmona (ENT) was contacted by ER and recommended to admit patient. Patient is planning for repeated laryngoscopy 3/28/22.     Upon visit patient can talk in full conversation without respiratory distress. Patient underwent suspension microlaryngoscopy with balloon dilation and jet vent, kenalog injection, flexible nasopharyngoscope with Dr. Sissy Baez on 3/28/22. Patient is recommend to have a trach placement to give patient vocal folds chance to heal. Patient and family would like perceived the plan with trach tube placement. Subjective (past 24 hours): No event over night.  Reported feeling ok with HF with flow rate 40L/min and FiO2 40%. Denied any fever, chill, chest pain, SOB, neck pain, cough, sputum production. ROS  Constitutional: Negative for appetite change, chills, diaphoresis, fever and unexpected weight change. HENT: Negative for congestion, dental problem, mouth sores, nosebleed  Respiratory: Negative for choking, chest tightness, wheezing and stridor. Cardiovascular: Negative for chest pain and palpitations. Gastrointestinal: Negative for anal bleeding, diarrhea, nausea and vomiting. Genitourinary: Negative for dysuria, flank pain, hematuria and urgency. Psychiatric/Behavioral: Negative for agitation, behavioral problems, confusion, decreased concentration, dysphoric mood and hallucinations.      Medications:  Reviewed    Infusion Medications    sodium chloride      dextrose      [Held by provider] dextrose 5% in lactated ringers       Scheduled Medications    ampicillin-sulbactam (UNASYN) IVPB 3000 mg  3,000 mg IntraVENous Q6H    insulin glargine  15 Units SubCUTAneous Nightly    busPIRone  10 mg Oral TID    dexamethasone  8 mg IntraVENous Q8H    budesonide  0.5 mg Nebulization BID    sodium chloride flush  5-40 mL IntraVENous 2 times per day    ipratropium-albuterol  1 ampule Inhalation Q4H WA    [Held by provider] aspirin  81 mg Oral Daily    famotidine  20 mg Oral BID    furosemide  40 mg Oral Daily    melatonin  6 mg Oral Nightly    polycarbophil  625 mg Oral Daily    rosuvastatin  10 mg Oral Nightly    guaiFENesin  600 mg Oral BID    metoprolol succinate  25 mg Oral Daily    insulin lispro  0-12 Units SubCUTAneous TID WC    insulin lispro  0-6 Units SubCUTAneous Nightly     PRN Meds: sodium chloride flush, sodium chloride, ondansetron **OR** ondansetron, polyethylene glycol, acetaminophen **OR** acetaminophen, racepinephrine HCl, sodium chloride nebulizer, diclofenac sodium, hydrOXYzine, nitroGLYCERIN, senna, glucagon (rDNA), dextrose, glucose, dextrose bolus (hypoglycemia) **OR** dextrose bolus (hypoglycemia)      Intake/Output Summary (Last 24 hours) at 3/29/2022 0709  Last data filed at 3/29/2022 0645  Gross per 24 hour   Intake 700 ml   Output 260 ml   Net 440 ml       Diet:  ADULT DIET; Clear Liquid    Exam:  /72   Pulse 63   Temp 97.7 °F (36.5 °C) (Oral)   Resp 18   Ht 5' 2\" (1.575 m)   Wt 210 lb 9.6 oz (95.5 kg)   LMP  (LMP Unknown)   SpO2 98%   BMI 38.52 kg/m²     General appearance: No apparent distress, appears stated age and cooperative. HEENT: Pupils equal, round, and reactive to light. Conjunctivae/corneas clear. Neck: Supple, with full range of motion. No jugular venous distention. Trachea midline. Respiratory:  Normal respiratory effort. Bilaterally  Expiratory wheezes on lower lung fields. Cardiovascular: Regular rate and rhythm with normal S1/S2 without murmurs, rubs or gallops. Abdomen: Soft, non-tender, non-distended with normal bowel sounds. Musculoskeletal: passive and active ROM x 4 extremities. Skin: Skin color, texture, turgor normal.  No rashes or lesions. Neurologic:  Neurovascularly intact without any focal sensory/motor deficits. Cranial nerves: II-XII intact, grossly non-focal.  Psychiatric: Alert and oriented, thought content appropriate, normal insight  Capillary Refill: Brisk,< 3 seconds   Peripheral Pulses: +2 palpable, equal bilaterally       Labs:   Recent Labs     03/27/22  0752 03/28/22  0657 03/29/22  0410   WBC 14.0* 11.6* 19.9*   HGB 14.3 11.9* 14.0   HCT 43.1 36.6* 43.8    209 231     Recent Labs     03/27/22  0928 03/28/22  0657 03/29/22  0410    137 135   K 4.2 4.7 4.4    100 100   CO2 31 28 23   BUN 17 12 16   CREATININE 0.5 0.5 0.5   CALCIUM 9.4 8.9 9.5     No results for input(s): AST, ALT, BILIDIR, BILITOT, ALKPHOS in the last 72 hours. No results for input(s): INR in the last 72 hours. No results for input(s): Poli Askew in the last 72 hours.     Microbiology:

## 2022-03-29 NOTE — PROGRESS NOTES
1845-Patient arrived from 4B via bed to 4K01. Patient orientated to unit and is on HF. Patient requested to sit in chair to eat dinner. Vitals obtained, no needs requested by patient at this time.

## 2022-03-29 NOTE — CARE COORDINATION
3/29/22, 3:17 PM EDT    DISCHARGE ON GOING [address] day: 2  Location: -10/010-A Reason for admit: SOB (shortness of breath) [R06.02]   Procedure:   3/27 XR Soft Tissue Neck: The visualized airway is normal  3/27 CXR: Stable radiographic appearance of the chest. No interval change since previous study dated 22 March 2022. 3/28 Suspension Microlaryngoscopy with balloon dilation and Jet Vent, Kenalog injection, flexible nasolaryngoscopy by Dr. Mindy Mendoza. Barriers to Discharge: Hospitalist, ENT following. PT/OT/SLP. WBC 19.9. Unasyn iv q6hr. Decadron iv q8hr. DuoNeb q4hr [employer]. IVF. Diabetes management. High flow O2 on at 40L and 40% FiO2. Sats 95%. Per ENT, do not think pt can discharge home with current airway. Feel pt will need trach prior to discharge. PCP: Katelynn De La Rosa  Readmission Risk Score: 22 ( )%  Patient Goals/Plan/Treatment Preferences: Spoke with pt and wife. They live at home together. She can drive, but her  does most of the driving. She has walker, wheelchair and home O2 through [facility]. Wears the O2 at 2L/nc at home. She has had [facility] in the past, open to it in the future only if needed. Verified PCP and insurance.

## 2022-03-29 NOTE — PROGRESS NOTES
[address] [address]  INPATIENT PHYSICAL THERAPY  EVALUATION  [facility] CVICU 4B - 4B-10/010-A    Time In: 7077  Time Out: 2979  Timed Code Treatment Minutes: 10 Minutes  Minutes: 23          Date: 3/29/2022  Patient Name: Too Suero,  Gender:  female        MRN: [id number]  : 1950  (73 y.o.)      Referring Practitioner: Concha Street DO  Diagnosis: SOB  Additional Pertinent Hx: The patient is a 73 y.o. female who was admitted to [address] [place] [address] late last evening. She is known to our service - s/p suspension microlaryngosocpy with balloon dilation and laryngeal steroid injection secondary to glottic stenosis 3/22/2022 with Dr Chico Orozco and Dr Meek Aj. Patient was discharged from hospital on 3/23/2022 with significant improvement in her breathing. Patient reports that she continues on supplemental oxygen at home. She reports progressive shortness of breath and stridor over the weekend. She states that she truly felt she could not breathe and was not sure if she would live while traveling to the ER last evening. Patient has history of hospitalization here at [address] [place] [address] from 2021-2022 for severe COVID-19 infection with respiratory failure. She failed HFNC and BiPAP progressing to need for endotracheal intubation on 2021 secondary to severe ARDS. She was extubated on 2022. Patient reports hoarse voice following extubation. She was transferred to inpatient rehab from following acute hospital stay. She was discharged on supplemental oxygen. She had ER visit on 3/9/2022 for shortness of breath, then presented again on 3/18/2022 for shortness of breath with admission. Pt is s/p SUSPENSION MICROLARYNGOSCOPY WITH BALLOON DILATION AND JET VENT, KENALOG INJECTION, flexible nasolaryngoscopy 3/28.      Restrictions/Precautions:  Restrictions/Precautions: Fall Risk    Subjective:  Chart Reviewed: Yes  Patient assessed for rehabilitation services?: Yes  Family / Caregiver Present: Yes  Subjective: RN approved session, pt is supine in bed, on high flow, agreeable to PT.     General:  Overall Orientation Status: Within Functional Limits  Follows Commands: Within Functional Limits    Vision: Within Functional Limits    Hearing: Within functional limits         Pain: denies    Vitals: Oxygen: high flow O2 40 L 10% FiO2; O2 sats remain >95% throughout session    Social/Functional History:    Lives With: Spouse  Type of Home: House  Home Layout: One level,Able to Live on Main level with bedroom/bathroom  Home Access: Stairs to enter without rails  Entrance Stairs - Number of Steps: 2-4inch steps, uses door frame  Home Equipment: Wheelchair-manual (NO AD at home, wheelchair for further distances in community)     Bathroom Shower/Tub: Walk-in shower,Shower chair without back (bench)  Bathroom Toilet: Standard  Bathroom Equipment: Grab bars in shower  Bathroom Accessibility: Accessible    Receives Help From: Family  ADL Assistance: Independent  Homemaking Assistance: Needs assistance  Ambulation Assistance: Independent  Transfer Assistance: Independent    Active : Yes (Patient reports she has driven a few times ~ 5 miles)  Occupation: Retired  Leisure & Hobbies: works in [employer] for 2 hours 2 days a week  Additional Comments: Pt amb without AD    OBJECTIVE:    Balance:  Static Sitting Balance:  Independent; sits EOB, dons socks and preps for transfer/gait training, at least 10 minutes  Static Standing Balance: Stand By Assistance  Dynamic Standing Balance: Stand By Assistance    Bed Mobility:  Supine to Sit: Independent  Sit to Supine: Independent     Transfers:  Sit to Stand: Stand By Assistance  Stand to Sit:Stand By Assistance    Ambulation:  Stand By Assistance  Distance: ~30 feet around the room  Surface: Level Tile  Device:No Device  Gait Deviations:  Slow Haylee, Decreased Step Length Bilaterally and Decreased Gait Speed    Functional Outcome Measures: Not completed       ASSESSMENT:  Activity Tolerance:  Patient tolerance of  treatment: good. Treatment Initiated: Treatment and education initiated within context of evaluation. Evaluation time included review of current medical information, gathering information related to past medical, social and functional history, completion of standardized testing, formal and informal observation of tasks, assessment of data and development of plan of care and goals. Treatment time included skilled education and facilitation of tasks to increase safety and independence with functional mobility for improved independence and quality of life. Assessment: Body structures, Functions, Activity limitations: Decreased functional mobility ,Decreased endurance,Decreased strength  Assessment: Pt tolerates session well, limited by impaired endurance, on high flow O2. PT to continue to progress strength and functional mobility. Prognosis: Excellent    REQUIRES PT FOLLOW UP: Yes    Discharge Recommendations:  Discharge Recommendations: Home with assist PRN    Patient Education:  PT Education: PT [facility]    Equipment Recommendations:  Equipment Needed: No    Plan:  Times per week: 2-3x GM  Current Treatment Recommendations: Strengthening,Functional Mobility Training,Transfer Training,Balance Training,Endurance Training,Gait Training,Stair training,Neuromuscular Re-education,Patient/Caregiver Education & Training    Goals:  Patient goals : to go home  Short term goals  Time Frame for Short term goals: by discharge  Short term goal 1: Pt to transfer sit <--> stand I for increased functional mobility. Short term goal 2: Pt to ambulate >200 feet without AD with Supervision for household ambulation. Short term goal 3: Pt to ascend/descend 2 steps with HR SBA for home entry. Long term goals  Time Frame for Long term goals : NA due to short length of stay.     Following session, patient left in safe position with all fall risk precautions in place.

## 2022-03-29 NOTE — PLAN OF CARE
Problem: Falls - Risk of:  Goal: Will remain free from falls  Description: Will remain free from falls  Outcome: Met This Shift  Note: Call light within reach. Side rails up x2. Bed alarm on. Non skid slippers available.      Goal: Absence of physical injury  Description: Absence of physical injury  Outcome: Met This Shift  Note: No new injuries observed     Problem: Daily Care:  Goal: Daily care needs are met  Description: Daily care needs are met  Note:        Problem: Skin Integrity:  Goal: Skin integrity will stabilize  Description: Skin integrity will stabilize  Outcome: Met This Shift  Note: No new signs or symptoms of skin breakdown noted this shift, encouraging patient to turn and reposition self in bed q2h       Problem: OXYGENATION/RESPIRATORY FUNCTION  Goal: Patient will maintain patent airway  Outcome: Met This Shift     Problem: Skin Integrity:  Goal: Will show no infection signs and symptoms  Description: Will show no infection signs and symptoms  Outcome: Met This Shift  Note: No new signs or symptoms of skin breakdown noted this shift, encouraging patient to turn and reposition self in bed q2h

## 2022-03-30 LAB
AEROBIC CULTURE: NORMAL
ANION GAP SERPL CALCULATED.3IONS-SCNC: 9 MEQ/L (ref 8–16)
BUN BLDV-MCNC: 31 MG/DL (ref 7–22)
CALCIUM SERPL-MCNC: 10 MG/DL (ref 8.5–10.5)
CHLORIDE BLD-SCNC: 97 MEQ/L (ref 98–111)
CO2: 29 MEQ/L (ref 23–33)
CREAT SERPL-MCNC: 0.8 MG/DL (ref 0.4–1.2)
ERYTHROCYTE [DISTWIDTH] IN BLOOD BY AUTOMATED COUNT: 12.7 % (ref 11.5–14.5)
ERYTHROCYTE [DISTWIDTH] IN BLOOD BY AUTOMATED COUNT: 41.8 FL (ref 35–45)
GFR SERPL CREATININE-BSD FRML MDRD: 71 ML/MIN/1.73M2
GLUCOSE BLD-MCNC: 306 MG/DL (ref 70–108)
GLUCOSE BLD-MCNC: 329 MG/DL (ref 70–108)
GLUCOSE BLD-MCNC: 334 MG/DL (ref 70–108)
GLUCOSE BLD-MCNC: 353 MG/DL (ref 70–108)
GLUCOSE BLD-MCNC: 387 MG/DL (ref 70–108)
GLUCOSE BLD-MCNC: 396 MG/DL (ref 70–108)
GRAM STAIN RESULT: NORMAL
HCT VFR BLD CALC: 43.6 % (ref 37–47)
HEMOGLOBIN: 14.2 GM/DL (ref 12–16)
MCH RBC QN AUTO: 29.2 PG (ref 26–33)
MCHC RBC AUTO-ENTMCNC: 32.6 GM/DL (ref 32.2–35.5)
MCV RBC AUTO: 89.5 FL (ref 81–99)
PLATELET # BLD: 257 THOU/MM3 (ref 130–400)
PMV BLD AUTO: 10.4 FL (ref 9.4–12.4)
POTASSIUM SERPL-SCNC: 5.3 MEQ/L (ref 3.5–5.2)
RBC # BLD: 4.87 MILL/MM3 (ref 4.2–5.4)
SODIUM BLD-SCNC: 135 MEQ/L (ref 135–145)
WBC # BLD: 26.3 THOU/MM3 (ref 4.8–10.8)

## 2022-03-30 PROCEDURE — 6360000002 HC RX W HCPCS: Performed by: NURSE PRACTITIONER

## 2022-03-30 PROCEDURE — 94640 AIRWAY INHALATION TREATMENT: CPT

## 2022-03-30 PROCEDURE — 82948 REAGENT STRIP/BLOOD GLUCOSE: CPT

## 2022-03-30 PROCEDURE — 6360000002 HC RX W HCPCS: Performed by: INTERNAL MEDICINE

## 2022-03-30 PROCEDURE — 97535 SELF CARE MNGMENT TRAINING: CPT

## 2022-03-30 PROCEDURE — 6370000000 HC RX 637 (ALT 250 FOR IP): Performed by: STUDENT IN AN ORGANIZED HEALTH CARE EDUCATION/TRAINING PROGRAM

## 2022-03-30 PROCEDURE — 85027 COMPLETE CBC AUTOMATED: CPT

## 2022-03-30 PROCEDURE — 6360000002 HC RX W HCPCS: Performed by: OTOLARYNGOLOGY

## 2022-03-30 PROCEDURE — 2580000003 HC RX 258: Performed by: INTERNAL MEDICINE

## 2022-03-30 PROCEDURE — 2060000000 HC ICU INTERMEDIATE R&B

## 2022-03-30 PROCEDURE — 99232 SBSQ HOSP IP/OBS MODERATE 35: CPT | Performed by: INTERNAL MEDICINE

## 2022-03-30 PROCEDURE — 6370000000 HC RX 637 (ALT 250 FOR IP): Performed by: INTERNAL MEDICINE

## 2022-03-30 PROCEDURE — 99233 SBSQ HOSP IP/OBS HIGH 50: CPT | Performed by: NURSE PRACTITIONER

## 2022-03-30 PROCEDURE — 94761 N-INVAS EAR/PLS OXIMETRY MLT: CPT

## 2022-03-30 PROCEDURE — 80048 BASIC METABOLIC PNL TOTAL CA: CPT

## 2022-03-30 PROCEDURE — 2580000003 HC RX 258: Performed by: STUDENT IN AN ORGANIZED HEALTH CARE EDUCATION/TRAINING PROGRAM

## 2022-03-30 PROCEDURE — 36415 COLL VENOUS BLD VENIPUNCTURE: CPT

## 2022-03-30 PROCEDURE — 97530 THERAPEUTIC ACTIVITIES: CPT

## 2022-03-30 PROCEDURE — 2700000000 HC OXYGEN THERAPY PER DAY

## 2022-03-30 RX ORDER — INSULIN GLARGINE 100 [IU]/ML
15 INJECTION, SOLUTION SUBCUTANEOUS 2 TIMES DAILY
Status: DISCONTINUED | OUTPATIENT
Start: 2022-03-30 | End: 2022-03-30

## 2022-03-30 RX ORDER — FUROSEMIDE 10 MG/ML
40 INJECTION INTRAMUSCULAR; INTRAVENOUS ONCE
Status: DISCONTINUED | OUTPATIENT
Start: 2022-03-30 | End: 2022-03-30

## 2022-03-30 RX ORDER — INSULIN GLARGINE 100 [IU]/ML
30 INJECTION, SOLUTION SUBCUTANEOUS NIGHTLY
Status: DISCONTINUED | OUTPATIENT
Start: 2022-03-30 | End: 2022-03-30

## 2022-03-30 RX ORDER — INSULIN GLARGINE 100 [IU]/ML
20 INJECTION, SOLUTION SUBCUTANEOUS NIGHTLY
Status: DISCONTINUED | OUTPATIENT
Start: 2022-03-30 | End: 2022-03-31

## 2022-03-30 RX ORDER — IPRATROPIUM BROMIDE AND ALBUTEROL SULFATE 2.5; .5 MG/3ML; MG/3ML
1 SOLUTION RESPIRATORY (INHALATION) 3 TIMES DAILY
Status: DISCONTINUED | OUTPATIENT
Start: 2022-03-30 | End: 2022-04-01

## 2022-03-30 RX ORDER — FUROSEMIDE 10 MG/ML
20 INJECTION INTRAMUSCULAR; INTRAVENOUS ONCE
Status: DISCONTINUED | OUTPATIENT
Start: 2022-03-30 | End: 2022-03-30

## 2022-03-30 RX ORDER — INSULIN GLARGINE 100 [IU]/ML
20 INJECTION, SOLUTION SUBCUTANEOUS 2 TIMES DAILY
Status: DISCONTINUED | OUTPATIENT
Start: 2022-03-30 | End: 2022-03-30

## 2022-03-30 RX ORDER — INSULIN GLARGINE 100 [IU]/ML
20 INJECTION, SOLUTION SUBCUTANEOUS NIGHTLY
Status: DISCONTINUED | OUTPATIENT
Start: 2022-03-30 | End: 2022-03-30

## 2022-03-30 RX ORDER — DEXAMETHASONE SODIUM PHOSPHATE 4 MG/ML
6 INJECTION, SOLUTION INTRA-ARTICULAR; INTRALESIONAL; INTRAMUSCULAR; INTRAVENOUS; SOFT TISSUE EVERY 12 HOURS
Status: DISCONTINUED | OUTPATIENT
Start: 2022-03-30 | End: 2022-04-04

## 2022-03-30 RX ADMIN — BUDESONIDE 500 MCG: 0.5 INHALANT RESPIRATORY (INHALATION) at 07:41

## 2022-03-30 RX ADMIN — SODIUM ZIRCONIUM CYCLOSILICATE 5 G: 5 POWDER, FOR SUSPENSION ORAL at 11:40

## 2022-03-30 RX ADMIN — BUSPIRONE HYDROCHLORIDE 10 MG: 10 TABLET ORAL at 09:11

## 2022-03-30 RX ADMIN — CALCIUM POLYCARBOPHIL 625 MG: 625 TABLET, FILM COATED ORAL at 09:11

## 2022-03-30 RX ADMIN — HYDROXYZINE PAMOATE 25 MG: 25 CAPSULE ORAL at 04:46

## 2022-03-30 RX ADMIN — GUAIFENESIN 600 MG: 600 TABLET, EXTENDED RELEASE ORAL at 20:06

## 2022-03-30 RX ADMIN — SODIUM ZIRCONIUM CYCLOSILICATE 5 G: 5 POWDER, FOR SUSPENSION ORAL at 21:19

## 2022-03-30 RX ADMIN — BUDESONIDE 500 MCG: 0.5 INHALANT RESPIRATORY (INHALATION) at 20:56

## 2022-03-30 RX ADMIN — INSULIN LISPRO 5 UNITS: 100 INJECTION, SOLUTION INTRAVENOUS; SUBCUTANEOUS at 17:12

## 2022-03-30 RX ADMIN — INSULIN LISPRO 4 UNITS: 100 INJECTION, SOLUTION INTRAVENOUS; SUBCUTANEOUS at 17:12

## 2022-03-30 RX ADMIN — ROSUVASTATIN CALCIUM 10 MG: 10 TABLET, FILM COATED ORAL at 20:04

## 2022-03-30 RX ADMIN — IPRATROPIUM BROMIDE AND ALBUTEROL SULFATE 1 AMPULE: .5; 3 SOLUTION RESPIRATORY (INHALATION) at 13:51

## 2022-03-30 RX ADMIN — DEXAMETHASONE SODIUM PHOSPHATE 6 MG: 4 INJECTION, SOLUTION INTRA-ARTICULAR; INTRALESIONAL; INTRAMUSCULAR; INTRAVENOUS; SOFT TISSUE at 16:21

## 2022-03-30 RX ADMIN — SODIUM CHLORIDE, PRESERVATIVE FREE 10 ML: 5 INJECTION INTRAVENOUS at 09:11

## 2022-03-30 RX ADMIN — FAMOTIDINE 20 MG: 20 TABLET ORAL at 09:11

## 2022-03-30 RX ADMIN — METOPROLOL SUCCINATE 25 MG: 25 TABLET, FILM COATED, EXTENDED RELEASE ORAL at 09:11

## 2022-03-30 RX ADMIN — INSULIN GLARGINE 20 UNITS: 100 INJECTION, SOLUTION SUBCUTANEOUS at 17:11

## 2022-03-30 RX ADMIN — IPRATROPIUM BROMIDE AND ALBUTEROL SULFATE 1 AMPULE: .5; 3 SOLUTION RESPIRATORY (INHALATION) at 01:07

## 2022-03-30 RX ADMIN — FAMOTIDINE 20 MG: 20 TABLET ORAL at 20:04

## 2022-03-30 RX ADMIN — BUSPIRONE HYDROCHLORIDE 10 MG: 10 TABLET ORAL at 16:21

## 2022-03-30 RX ADMIN — IPRATROPIUM BROMIDE AND ALBUTEROL SULFATE 1 AMPULE: .5; 3 SOLUTION RESPIRATORY (INHALATION) at 07:41

## 2022-03-30 RX ADMIN — INSULIN LISPRO 5 UNITS: 100 INJECTION, SOLUTION INTRAVENOUS; SUBCUTANEOUS at 11:48

## 2022-03-30 RX ADMIN — INSULIN LISPRO 5 UNITS: 100 INJECTION, SOLUTION INTRAVENOUS; SUBCUTANEOUS at 09:14

## 2022-03-30 RX ADMIN — BUSPIRONE HYDROCHLORIDE 10 MG: 10 TABLET ORAL at 20:04

## 2022-03-30 RX ADMIN — SODIUM CHLORIDE 3000 MG: 900 INJECTION INTRAVENOUS at 02:28

## 2022-03-30 RX ADMIN — IPRATROPIUM BROMIDE AND ALBUTEROL SULFATE 1 AMPULE: .5; 3 SOLUTION RESPIRATORY (INHALATION) at 20:55

## 2022-03-30 RX ADMIN — DEXAMETHASONE SODIUM PHOSPHATE 8 MG: 4 INJECTION, SOLUTION INTRA-ARTICULAR; INTRALESIONAL; INTRAMUSCULAR; INTRAVENOUS; SOFT TISSUE at 02:25

## 2022-03-30 RX ADMIN — Medication 6 MG: at 20:04

## 2022-03-30 RX ADMIN — SODIUM ZIRCONIUM CYCLOSILICATE 5 G: 5 POWDER, FOR SUSPENSION ORAL at 16:21

## 2022-03-30 RX ADMIN — INSULIN LISPRO 5 UNITS: 100 INJECTION, SOLUTION INTRAVENOUS; SUBCUTANEOUS at 09:16

## 2022-03-30 RX ADMIN — GUAIFENESIN 600 MG: 600 TABLET, EXTENDED RELEASE ORAL at 09:11

## 2022-03-30 RX ADMIN — INSULIN LISPRO 5 UNITS: 100 INJECTION, SOLUTION INTRAVENOUS; SUBCUTANEOUS at 09:15

## 2022-03-30 RX ADMIN — INSULIN LISPRO 3 UNITS: 100 INJECTION, SOLUTION INTRAVENOUS; SUBCUTANEOUS at 20:08

## 2022-03-30 RX ADMIN — FUROSEMIDE 40 MG: 40 TABLET ORAL at 09:11

## 2022-03-30 RX ADMIN — INSULIN LISPRO 5 UNITS: 100 INJECTION, SOLUTION INTRAVENOUS; SUBCUTANEOUS at 11:47

## 2022-03-30 ASSESSMENT — PAIN SCALES - GENERAL
PAINLEVEL_OUTOF10: 0
PAINLEVEL_OUTOF10: 0

## 2022-03-30 NOTE — PLAN OF CARE
Pt. A&O X4. Denies shortness of breath. Pt. Tolerated the heated high flow at 40% 40L, denies pain at this time. Will continue to monitor.    Problem: Falls - Risk of:  Goal: Will remain free from falls  Description: Will remain free from falls  Outcome: Ongoing  Goal: Absence of physical injury  Description: Absence of physical injury  Outcome: Ongoing     Problem: Daily Care:  Goal: Daily care needs are met  Description: Daily care needs are met  Outcome: Ongoing     Problem: Pain:  Goal: Pain level will decrease  Description: Pain level will decrease  Outcome: Ongoing  Goal: Control of acute pain  Description: Control of acute pain  Outcome: Ongoing  Goal: Control of chronic pain  Description: Control of chronic pain  Outcome: Ongoing     Problem: Skin Integrity:  Goal: Skin integrity will stabilize  Description: Skin integrity will stabilize  Outcome: Ongoing     Problem: Discharge Planning:  Goal: Patients continuum of care needs are met  Description: Patients continuum of care needs are met  Outcome: Ongoing     Problem: OXYGENATION/RESPIRATORY FUNCTION  Goal: Patient will maintain patent airway  3/30/2022 0819 by Guillermo Stein RN  Outcome: Ongoing  3/29/2022 2107 by Tommy Martinez RCP  Outcome: Ongoing  Goal: Patient will achieve/maintain normal respiratory rate/effort  Description: Respiratory rate and effort will be within normal limits for the patient  3/30/2022 0819 by Guillermo Stein RN  Outcome: Ongoing  3/29/2022 2107 by Tommy Martinez RCP  Outcome: Ongoing     Problem: Skin Integrity:  Goal: Will show no infection signs and symptoms  Description: Will show no infection signs and symptoms  Outcome: Ongoing  Goal: Absence of new skin breakdown  Description: Absence of new skin breakdown  Outcome: Ongoing

## 2022-03-30 NOTE — CARE COORDINATION
Collaborative Discharge Planning    Alonso Sellers  :  1950  MRN:  [id number]    ADMIT DATE:  3/27/2022      Discharge Planning Discharge Planning  Living Arrangements: Spouse/Significant Other  Support Systems: Family Members  DME: [facility]  Type of Home Care Services: Nursing Services  Patient expects to be discharged to[de-identified] LTAC  White Board Notes /Social Work Whiteboard Notes  /Social Work Whiteboard: 3/29 CM: From home w/. Has home O2 through [address] trach this week. Open to [facility]. Procedure:   3/28 Suspension Microlaryngoscopy w Balloon Dilation and Jet Ventilaton, Kenalog Injection, Flexibile Nasolaryngosocpy    Discharge Plan lives w spouse [facility]; monitor for possible new trach teaching, has [employer] oxygen 2L ATC, WC; therapy following    Discharge Milestones and Delays: Clinical status   From 3B. History ARDS, Covid infection. Oxygen weaning. Elevated WBC/GLUC; monitor. 40% FIO2/35L HF Oxygen continued.  ENT plans  new tracheostomy for glottic stenosis         SIGNED:  Caro Mendieta RN   3/30/2022, 2:13 PM

## 2022-03-30 NOTE — PROGRESS NOTES
Department of Otolaryngology  Progress Note    Chief Complaint:  Dyspnea, stridor    SUBJECTIVE:  No acute events overnight. Patient reports her breathing feels better this morning. She reports waking up and not feeling tightness or anxiety. She is receiving 8mg Decadron Q8H since yesterday morning, as well as budesonide nebs. Glucose elevated secondary to IV steroids; insulin coverage. Leukocytosis secondary to steroids as well. Culture from OR negative. A complete multi-organ review of systems was performed using a new patient questionnaire, and reviewed by me. ENT:  negative except as noted in HPI  CONSTITUTIONAL:  negative except as noted in HPI  EYES:  negative except as noted in HPI  RESPIRATORY:  negative except as noted in HPI  CARDIOVASCULAR:  negative except as noted in HPI  GASTROINTESTINAL:  negative except as noted in HPI  GENITOURINARY:  negative except as noted in HPI  MUSCULOSKELETAL:  negative except as noted in HPI  SKIN:  negative except as noted in HPI  ENDOCRINE/METABOLIC: negative except as noted in HPI  HEMATOLOGIC/LYMPHATIC:  negative except as noted in HPI  ALLERGY/IMMUN: negative except as noted in HPI  NEUROLOGICAL:  negative except as noted in HPI  BEHAVIOR/PSYCH:  negative except as noted in HPI    OBJECTIVE      Physical  VITALS:  /77   Pulse 62   Temp 97.6 °F (36.4 °C) (Oral)   Resp 16   Ht 5' 2\" (1.575 m)   Wt 210 lb 9.6 oz (95.5 kg)   LMP  (LMP Unknown)   SpO2 100%   BMI 38.52 kg/m²     ABNORMAL OTOLARYNGOLOGIC EXAM FINDINGS: Mild airflow turbulence with inspiration and expiration while talking. No problem swallowing. Voice remains breathy, weak, and rough.     OTHER PERTINENT EXAM FINDINGS:  GENERAL: Awake, NAD, Non-toxic appearing. Patient is alert and oriented toperson, place and time. Not obviously hearing impaired.   NEUROLOGICAL: GCS 15, Cranial nerves II-VI, VIII-XII grossly intact,  Facial nerve (VII) w/ House-Brackman Grade 1 of 6 bilaterally, No evidence of nystagmus or gross asymmetry    PSYCHIATRIC: Mood and Affect appropriate. HEAD: NC/AT  SKIN: No overtly ulcerated, cellulitic, or indurated lesions of the head or neck. EARS: Pinna well-formed  NOSE: Dorsum w/o scar or deformity  ORAL CAVITY: No mucosal masses/lesions appreciated,    OROPHARYNX: No mucosal masses or lesions appreciated. NECK: Soft, supple. No crepitus or masses appreciated,  Trachea midline. CHEST: Breathing is mildly labored, but without retraction. With increased respiratory effort, comes increased biphasic stridor.      Data  CBC with Differential:    Lab Results   Component Value Date    WBC 26.3 03/30/2022    RBC 4.87 03/30/2022    HGB 14.2 03/30/2022    HCT 43.6 03/30/2022     03/30/2022    MCV 89.5 03/30/2022    MCH 29.2 03/30/2022    MCHC 32.6 03/30/2022    NRBC 0 03/27/2022    SEGSPCT 65.9 03/27/2022    MONOPCT 7.6 03/27/2022    MONOSABS 1.1 03/27/2022    LYMPHSABS 3.2 03/27/2022    EOSABS 0.1 03/27/2022    BASOSABS 0.1 03/27/2022     BMP:    Lab Results   Component Value Date     03/30/2022    K 5.3 03/30/2022    K 5.0 03/23/2022    CL 97 03/30/2022    CO2 29 03/30/2022    BUN 31 03/30/2022    LABALBU 4.3 03/18/2022    CREATININE 0.8 03/30/2022    CALCIUM 10.0 03/30/2022    LABGLOM 71 03/30/2022    GLUCOSE 306 03/30/2022       Inpatient Medications  Current Facility-Administered Medications: sodium zirconium cyclosilicate (LOKELMA) oral suspension 5 g, 5 g, Oral, TID  insulin lispro (HUMALOG) injection vial 5 Units, 5 Units, SubCUTAneous, Once  dexamethasone (DECADRON) injection 6 mg, 6 mg, IntraVENous, Q12H  ipratropium-albuterol (DUONEB) nebulizer solution 1 ampule, 1 ampule, Inhalation, TID  insulin glargine (LANTUS) injection vial 20 Units, 20 Units, SubCUTAneous, Nightly  lactated ringers infusion, , IntraVENous, Continuous  insulin lispro (HUMALOG) injection vial 0-6 Units, 0-6 Units, SubCUTAneous, TID WC  insulin lispro (HUMALOG) injection vial 0-3 Units, 0-3 Units, SubCUTAneous, Nightly  insulin lispro (HUMALOG) injection vial 5 Units, 5 Units, SubCUTAneous, TID AC  busPIRone (BUSPAR) tablet 10 mg, 10 mg, Oral, TID  budesonide (PULMICORT) nebulizer suspension 500 mcg, 0.5 mg, Nebulization, BID  sodium chloride flush 0.9 % injection 5-40 mL, 5-40 mL, IntraVENous, 2 times per day  sodium chloride flush 0.9 % injection 5-40 mL, 5-40 mL, IntraVENous, PRN  0.9 % sodium chloride infusion, 25 mL, IntraVENous, PRN  ondansetron (ZOFRAN-ODT) disintegrating tablet 4 mg, 4 mg, Oral, Q8H PRN **OR** ondansetron (ZOFRAN) injection 4 mg, 4 mg, IntraVENous, Q6H PRN  polyethylene glycol (GLYCOLAX) packet 17 g, 17 g, Oral, Daily PRN  acetaminophen (TYLENOL) tablet 650 mg, 650 mg, Oral, Q6H PRN **OR** acetaminophen (TYLENOL) suppository 650 mg, 650 mg, Rectal, Q6H PRN  racepinephrine HCl (VAPONEFPRIN) 2.25 % nebulizer solution NEBU 11.25 mg, 11.25 mg, Nebulization, Q4H PRN  sodium chloride nebulizer 0.9 % solution 3 mL, 3 mL, Nebulization, Q4H PRN  [Held by provider] aspirin chewable tablet 81 mg, 81 mg, Oral, Daily  diclofenac sodium (VOLTAREN) 1 % gel 2 g, 2 g, Topical, 4x Daily PRN  famotidine (PEPCID) tablet 20 mg, 20 mg, Oral, BID  furosemide (LASIX) tablet 40 mg, 40 mg, Oral, Daily  hydrOXYzine (VISTARIL) capsule 25 mg, 25 mg, Oral, 4x Daily PRN  melatonin tablet 6 mg, 6 mg, Oral, Nightly  nitroGLYCERIN (NITROSTAT) SL tablet 0.4 mg, 0.4 mg, SubLINGual, Q5 Min PRN  polycarbophil (FIBERCON) tablet 625 mg, 625 mg, Oral, Daily  rosuvastatin (CRESTOR) tablet 10 mg, 10 mg, Oral, Nightly  senna (SENOKOT) tablet 8.6 mg, 1 tablet, Oral, BID PRN  guaiFENesin (MUCINEX) extended release tablet 600 mg, 600 mg, Oral, BID  metoprolol succinate (TOPROL XL) extended release tablet 25 mg, 25 mg, Oral, Daily  glucagon (rDNA) injection 1 mg, 1 mg, IntraMUSCular, PRN  dextrose 5 % solution, 100 mL/hr, IntraVENous, PRN  glucose chewable tablet 4 each, 4 tablet, Oral, PRN  dextrose bolus (hypoglycemia) 10% 125 mL, 125 mL, IntraVENous, PRN **OR** dextrose bolus (hypoglycemia) 10% 250 mL, 250 mL, IntraVENous, PRN    ASSESSMENT AND PLAN    Deshawn Rizo is a 79-year-old patient with glottic stenosis of unclear etiology. She is now postop day 2 from suspension microlaryngoscopy with airway balloon dilation, and steroid injection. Her airway dilated adequately and there appeared to be minimal scarring around her cricoarytenoid complex. Culture of the area on the right-hand side was negative. Flexible nasolaryngoscopy prior to surgery and after surgery; there was no appreciable improvement in her glottic aperture. 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 Perhaps there is a component of paroxysmal vocal fold motion as anxiety certainly seems to worsen her breathing, as she has attacks where her breathing is acutely worse that are relieved by anxiolytics. She is also perseverating on the role that Mucinex plays in her breathing. I tried to explain the Mucinex neither helps nor hurts her ability to move her vocal cords. Respiratory distress; Glottic stenosis, unknown etiology  - Continue high-dose steroids  - Continue 60 L high flow nasal cannula  - Continuous pulse oximetry  - Okay for regular (diabetic) diet again today. - Discussed with patient and  the potential need for tracheostomy to provide an adequate airway and give her vocal folds a chance to begin moving. We do not think her current airway is adequate enough for discharge home. We do not think we will be able to successfully wean her high flow nasal cannula or steroids. She is tentatively planned for tracheostomy placement Friday afternoon pending her progress. Patient and  report understanding and are both comfortable and sure of the decision to proceed with tracheostomy if necessary.     Management of patient's care was collaborated with Dr Shannan Giraldo today.       Electronically signed by Memorop WYATT Muniz - CNP on 3/30/2022 at 8:37 AM

## 2022-03-30 NOTE — PROGRESS NOTES
[facility] ICU STEPDOWN TELEMETRY 4K  Occupational Therapy  Daily Note  Time:   Time In: 5548  Time Out: 1540  Timed Code Treatment Minutes: 44 Minutes  Minutes: 44          Date: 3/30/2022  Patient Name: Karla Syed,   Gender: female      Room: Counts include 234 beds at the [facility]  MRN: [id number]  : 1950  (73 y.o.)  Referring Practitioner: Anders Bowles DO  Diagnosis: Shortness of breath  Additional Pertinent Hx: The patient is a 73 y.o. female who was admitted to [facility] late last evening. She is known to our service - s/p suspension microlaryngosocpy with balloon dilation and laryngeal steroid injection secondary to glottic stenosis 3/22/2022 with Dr Brian Slater and Dr Margaret Kauffman. Patient was discharged from hospital on 3/23/2022 with significant improvement in her breathing. Patient reports that she continues on supplemental oxygen at home. She reports progressive shortness of breath and stridor over the weekend. She states that she truly felt she could not breathe and was not sure if she would live while traveling to the ER last evening. Patient has history of hospitalization here at [address] [place] [address] from 2021-2022 for severe COVID-19 infection with respiratory failure. She failed HFNC and BiPAP progressing to need for endotracheal intubation on 2021 secondary to severe ARDS. She was extubated on 2022. Patient reports hoarse voice following extubation. She was transferred to inpatient rehab from following acute hospital stay. She was discharged on supplemental oxygen. She had ER visit on 3/9/2022 for shortness of breath, then presented again on 3/18/2022 for shortness of breath with admission. Restrictions/Precautions:  Restrictions/Precautions: Fall Risk     SUBJECTIVE: Patient supine in bed upon arrival; agreeable to therapy this date. Patient pleasant and cooperative throughout. Increased time required d/t SOB.       PAIN: 0/10:     Vitals: Vitals not assessed per clinical judgement, see nursing flowsheet HF FiO2 40 O2 Flow rate 35    COGNITION: WFL    ADL:   Toileting: Minimal Assistance. thoroughness with rear periarea standing  Toilet Transfer: Stand By Assistance. BSC, patient uses BSC d/t HF machine unable to reach bathroom. BALANCE:  Sitting Balance:  Supervision. Standing Balance: Stand By Assistance. no device, no LOB    BED MOBILITY:  Rolling to Right: Supervision    Supine to Sit: Supervision    Sit to Supine: Supervision    Scooting: Supervision      TRANSFERS:  Sit to Stand:  Stand By Assistance. Stand to Sit: Stand By Assistance. FUNCTIONAL MOBILITY:  Assistive Device: None  Assist Level:  Stand By Assistance. Distance: EOB to [facility]  Steady pace, patient limited with mobility d/t HF machine     ADDITIONAL ACTIVITIES:  Patient provided education regarding energy conservation techniques in order to increase activity tolerance and independence with ADLs/IADLs; verbalizing understanding with all questions answered. ASSESSMENT:     Activity Tolerance:  Patient tolerance of  treatment: good. Discharge Recommendations: Continue to assess pending progress  Equipment Recommendations: Equipment Needed: No  Plan: Times per week: 5x  Current Treatment Recommendations: Strengthening,Functional Mobility Training,Endurance Training,Balance Training,Patient/Caregiver Education & Training,Self-Care / ADL,Safety Education & Training    Patient Education  Patient Education: Role of OT, Plan of Care, ADL's, IADL's, Energy Conservation, Home Safety and Importance of Increasing Activity    Goals  Short term goals  Time Frame for Short term goals: Until discharge  Short term goal 1: Pt will complete BUE light resistive exercises with 0 vcs for technique while O2 stats maintain above 90% to increase endurance with self cares.   Short term goal 2: Pt will complete BADL routine with S and 0 vcs for safety while demonstrating EC techniques to

## 2022-03-30 NOTE — PLAN OF CARE
Problem: Falls - Risk of:  Goal: Will remain free from falls  Description: Will remain free from falls  3/30/2022 1336 by Meka Lewis RN  Outcome: Ongoing  Note: Call light and bedside table within reach. Standby assist to walk in room. Problem: Falls - Risk of:  Goal: Absence of physical injury  Description: Absence of physical injury  3/30/2022 1336 by Meka Lewis RN  Outcome: Ongoing  Note: Patient turns self, belongings within reach, and needs anticipated. Problem: Daily Care:  Goal: Daily care needs are met  Description: Daily care needs are met  3/30/2022 1336 by Meka Lewis RN  Outcome: Ongoing  Note: Patient assisted as needed to wash self and brush teeth today. Problem: Pain:  Goal: Pain level will decrease  Description: Pain level will decrease  3/30/2022 1336 by Meka Lewis RN  Outcome: Ongoing  Note: Pain level assessed hourly and interventions completed as appropriate     Problem: Skin Integrity:  Goal: Skin integrity will stabilize  Description: Skin integrity will stabilize  3/30/2022 1336 by Meka Lewis RN  Outcome: Ongoing  Note: Skin assessed and kept clear of harm. Nutrition encouraged. Turns self. Pillow support offered. Problem: Discharge Planning:  Goal: Patients continuum of care needs are met  Description: Patients continuum of care needs are met  3/30/2022 1336 by Meka Lewis RN  Outcome: Ongoing  Note: Interdisciplinary rounds completed and plan of care assessed to best fit patients needs and goal      Problem: OXYGENATION/RESPIRATORY FUNCTION  Goal: Patient will maintain patent airway  3/30/2022 1336 by Meka Lewis RN  Outcome: Ongoing  Note: Airway and Sp02 assessed. Heated high flow on for patient comfort and moisture.      Problem: OXYGENATION/RESPIRATORY FUNCTION  Goal: Patient will achieve/maintain normal respiratory rate/effort  Description: Respiratory rate and effort will be within normal limits for the patient  3/30/2022 1336 by Krupa Fry RN  Outcome: Ongoing  Note: Respiratory rate observed, lung sounds auscultated. Problem: Skin Integrity:  Goal: Will show no infection signs and symptoms  Description: Will show no infection signs and symptoms  3/30/2022 1336 by Krupa Fry RN  Outcome: Ongoing  Note: HR, temperature, and skin integrity monitored     Problem: Skin Integrity:  Goal: Absence of new skin breakdown  Description: Absence of new skin breakdown  3/30/2022 1336 by Krupa Fry RN  Outcome: Ongoing  Note: Skin assessed Q4hrs and as needed- no skin breakdown at this time.

## 2022-03-30 NOTE — RT PROTOCOL NOTE
RT Inhaler-Nebulizer Bronchodilator Protocol Note    There is a bronchodilator order in the chart from a provider indicating to follow the RT Bronchodilator Protocol and there is an Initiate RT Inhaler-Nebulizer Bronchodilator Protocol order as well (see protocol at bottom of note). CXR Findings:  XR CHEST PORTABLE    Result Date: 3/28/2022  Stable radiographic appearance of the chest. No evidence of an acute process. **This report has been created using voice recognition software. It may contain minor errors which are inherent in voice recognition technology. ** Final report electronically signed by Dr. Nahomy Johnson MD on 3/28/2022 2:10 PM      The findings from the last RT Protocol Assessment were as follows:   History Pulmonary Disease: None or smoker <15 pack years  Respiratory Pattern: Dyspnea on exertion or RR 21-25 bpm  Breath Sounds: Inspiratory and expiratory or bilateral wheezing and/or rhonchi  Cough: Strong, productive  Indication for Bronchodilator Therapy: Decreased or absent breath sounds  Bronchodilator Assessment Score: 9    Aerosolized bronchodilator medication orders have been revised according to the RT Inhaler-Nebulizer Bronchodilator Protocol below. Respiratory Therapist to perform RT Therapy Protocol Assessment initially then follow the protocol. Repeat RT Therapy Protocol Assessment PRN for score 0-3 or on second treatment, BID, and PRN for scores above 3. No Indications - adjust the frequency to every 6 hours PRN wheezing or bronchospasm, if no treatments needed after 48 hours then discontinue using Per Protocol order mode. If indication present, adjust the RT bronchodilator orders based on the Bronchodilator Assessment Score as indicated below.   Use Inhaler orders unless patient has one or more of the following: on home nebulizer, not able to hold breath for 10 seconds, is not alert and oriented, cannot activate and use MDI correctly, or respiratory rate 25 breaths per minute or more, then use the equivalent nebulizer order(s) with same Frequency and PRN reasons based on the score. If a patient is on this medication at home then do not decrease Frequency below that used at home. 0-3 - enter or revise RT bronchodilator order(s) to equivalent RT Bronchodilator order with Frequency of every 4 hours PRN for wheezing or increased work of breathing using Per Protocol order mode. 4-6 - enter or revise RT Bronchodilator order(s) to two equivalent RT bronchodilator orders with one order with BID Frequency and one order with Frequency of every 4 hours PRN wheezing or increased work of breathing using Per Protocol order mode. 7-10 - enter or revise RT Bronchodilator order(s) to two equivalent RT bronchodilator orders with one order with TID Frequency and one order with Frequency of every 4 hours PRN wheezing or increased work of breathing using Per Protocol order mode. 11-13 - enter or revise RT Bronchodilator order(s) to one equivalent RT bronchodilator order with QID Frequency and an Albuterol order with Frequency of every 4 hours PRN wheezing or increased work of breathing using Per Protocol order mode. Greater than 13 - enter or revise RT Bronchodilator order(s) to one equivalent RT bronchodilator order with every 4 hours Frequency and an Albuterol order with Frequency of every 2 hours PRN wheezing or increased work of breathing using Per Protocol order mode. RT to enter RT Home Evaluation for COPD & MDI Assessment order using Per Protocol order mode.     Electronically signed by Rosie Celis RCP on 3/30/2022 at 8:01 AM

## 2022-03-30 NOTE — PROGRESS NOTES
Hospitalist Progress Note    Patient:  Elizabeth Fox      Unit/Bed:4K-01/001-A    YOB: 1950    MRN: [id number]       Acct: [de-identified]     PCP: Irina January    Date of Admission: 3/27/2022    Assessment/Plan:    1. Acute on chronic hypoxic respiratory failure: 2/2 Glottic stenosis. Received Decadron 8mg daily, Racepinephrine in ED which O2 Sat improved from 93% -> 100%. SOB was improved. Dr. Beny Alfaro was contected by ED with recommend admitted and plan for Laryngoscopy (3/28). Suspension microlaryngoscopy with balloon dilation and jet vent, kenalog injection, flexible nasopharyngoscope with Dr. Sherman Silence on 3/28/22. On 3/29,  ENT recommended tracheostomy to provide an adequate airway and give her vocal folds a chance to begin moving, patient may need high flow and steroid for a period of time. Trach tube is planning for 4/1/22  3/30: on HF with FiO2 40% and Flow rate 40L/min with O2 Sat 100%. Plan: wean off O2 as tolerate keep O2 Sat >90%     2. Glottic stenosis: Stridor on physical exam. S/p suspension microlaryngoscopy with relief of glottic stenosis, airway balloon dilation, and laryngeal steroid injection on 3/22/22. Plan: cont Decadron 6mg q12hrs, Duoneb TID,      3. HTN: /96. Start Metoprolol 25mg daily  3/30: Tolerate Metoprolol. Stable SBP 120s. Plan: cont Metoprolol 25 mg daily on discharged     4. Hx CAD: s/p PCI 2008. Echo (3/18/22) EF 60%, with normal LV function. Plan: start Metoprolol 25mg daily, cont Crestor, monitor I/O, Weight daily, recommend follow with Card/PCP for GDMT on discharged.     5. NIDDM2: Lantus 20unit, SSI low Humalog, Insulin Regular 5 units with meal, Hypoglycemia protocol     6. GERD: cont Famotidine      7. Insomnia: cont Melatoninn     8. Anxiety: patient is very anxious about couldn't be able to breath. Patient worry that she couldn't be able to make it to hospital next time.  She reported that she could feel improving SOB and \"could be able to get air into her lung\" if she could take a deep breath. However, patient couldn't be able to stay calm when she was SOB. Plan: Start Buspar     9. Leukocytosis: 2/2 steroid. Afebrile last 24 hrs. Expected discharge date:  3/28/22    Disposition:    [x] Home       [] TCU       [] Rehab       [] Psych       [] SNF       [] [place]       [] Other-    Chief Complaint: SOB    Hospital Course: Deshawn Rizo is 73years old female who presented to the hospital for worsening shortness of breath. Past medical history significant with recent hospital admission for glottic stenosis (3/18-23/22), CAD SP PIC, hypertension, diabetes, anxiety. Patient was recently admitted and managed for glottic stenosis with she underwent suspension microlaryngoscopy with relief of glottic stenosis, airway balloon dilation, and laryngeal steroid injection on 3/22/22. Patient was discharged home on 3/24. Since 3/25, patient has increase in mucus production which patient feeling \"something stuck in her throat\". She has been using Intensive spirometry at home which she recall that from yesterday she couldn't be able to get any air into the spirometry. Patient reported SOB has been worsening which she decided to to to ED. Denied any fever, chill, chest pain, yellow cough without sputum production, recent sick contact.     In the ED, patient O2 sat 93% on room air. Patient received Decadron 8 mg and racemic epi which her SOB has been improved and O2 sat 100% on room air. Dr. Amna Cervantes (ENT) was contacted by ER and recommended to admit patient. Patient is planning for repeated laryngoscopy 3/28/22.     Upon visit patient can talk in full conversation without respiratory distress. Patient underwent suspension microlaryngoscopy with balloon dilation and jet vent, kenalog injection, flexible nasopharyngoscope with Dr. Shannan Giraldo on 3/28/22.  Patient is recommend to have a trach placement to give patient vocal folds chance to heal. Patient and family would like perceived the plan with trach tube placement. Trach tube placement is planning for 4/1/22. During hospital stay, patient BP elevate which she was started on Metoprolol 25 mg daily. Subjective (past 24 hours): No event over night. Upon morning visit, patient reported doing well with high flow. Buspar helps patient stay clam. No episode of SOB, gasping for air, choking. Denied any fever, chill, chest pain, cough, nasal discharged. ROS  Constitutional: Negative for appetite change, chills, diaphoresis, fever and unexpected weight change. HENT: Negative for congestion, dental problem, mouth sores, nosebleed  Respiratory: Negative for choking, chest tightness, wheezing and stridor. Cardiovascular: Negative for chest pain and palpitations. Gastrointestinal: Negative for anal bleeding, diarrhea, nausea and vomiting. Genitourinary: Negative for dysuria, flank pain, hematuria and urgency. Psychiatric/Behavioral: Negative for agitation, behavioral problems, confusion, decreased concentration, dysphoric mood and hallucinations.      Medications:  Reviewed    Infusion Medications    lactated ringers 50 mL/hr at 03/29/22 0945    sodium chloride      dextrose       Scheduled Medications    sodium zirconium cyclosilicate  5 g Oral TID    dexamethasone  6 mg IntraVENous Q12H    ipratropium-albuterol  1 ampule Inhalation TID    insulin glargine  20 Units SubCUTAneous Nightly    insulin lispro  0-6 Units SubCUTAneous TID WC    insulin lispro  0-3 Units SubCUTAneous Nightly    insulin lispro  5 Units SubCUTAneous TID AC    busPIRone  10 mg Oral TID    budesonide  0.5 mg Nebulization BID    sodium chloride flush  5-40 mL IntraVENous 2 times per day    [Held by provider] aspirin  81 mg Oral Daily    famotidine  20 mg Oral BID    furosemide  40 mg Oral Daily    melatonin  6 mg Oral Nightly    polycarbophil  625 mg Oral Daily    rosuvastatin  10 mg Oral Nightly    guaiFENesin  600 mg Oral BID    metoprolol succinate  25 mg Oral Daily     PRN Meds: sodium chloride flush, sodium chloride, ondansetron **OR** ondansetron, polyethylene glycol, acetaminophen **OR** acetaminophen, racepinephrine HCl, sodium chloride nebulizer, diclofenac sodium, hydrOXYzine, nitroGLYCERIN, senna, glucagon (rDNA), dextrose, glucose, dextrose bolus (hypoglycemia) **OR** dextrose bolus (hypoglycemia)      Intake/Output Summary (Last 24 hours) at 3/30/2022 0940  Last data filed at 3/30/2022 0911  Gross per 24 hour   Intake 10 ml   Output 1000 ml   Net -990 ml       Diet:  ADULT DIET; Regular; 3 carb choices (45 gm/meal)    Exam:  /77   Pulse 62   Temp 97.6 °F (36.4 °C) (Oral)   Resp 16   Ht 5' 2\" (1.575 m)   Wt 189 lb 9.5 oz (86 kg)   LMP  (LMP Unknown)   SpO2 100%   BMI 34.68 kg/m²     General appearance: No apparent distress, appears stated age and cooperative. HEENT: Pupils equal, round, and reactive to light. Conjunctivae/corneas clear. Neck: Supple, with full range of motion. No jugular venous distention. Trachea midline. Respiratory:  Normal respiratory effort. Bilaterally  Expiratory wheezes on lower lung fields. Cardiovascular: Regular rate and rhythm with normal S1/S2 without murmurs, rubs or gallops. Abdomen: Soft, non-tender, non-distended with normal bowel sounds. Musculoskeletal: passive and active ROM x 4 extremities. Skin: Skin color, texture, turgor normal.  No rashes or lesions. Neurologic:  Neurovascularly intact without any focal sensory/motor deficits.  Cranial nerves: II-XII intact, grossly non-focal.  Psychiatric: Alert and oriented, thought content appropriate, normal insight  Capillary Refill: Brisk,< 3 seconds   Peripheral Pulses: +2 palpable, equal bilaterally       Labs:   Recent Labs     03/28/22  0657 03/29/22  0410 03/30/22  0451   WBC 11.6* 19.9* 26.3*   HGB 11.9* 14.0 14.2   HCT 36.6* 43.8 43.6    231 257     Recent Labs     03/28/22  0657 03/29/22 0410 03/30/22  0451    135 135   K 4.7 4.4 5.3*    100 97*   CO2 28 23 29   BUN 12 16 31*   CREATININE 0.5 0.5 0.8   CALCIUM 8.9 9.5 10.0     No results for input(s): AST, ALT, BILIDIR, BILITOT, ALKPHOS in the last 72 hours. No results for input(s): INR in the last 72 hours. No results for input(s): Jacqueline Katerine in the last 72 hours. Microbiology:      Urinalysis:      Lab Results   Component Value Date    NITRU NEGATIVE 01/10/2022    WBCUA 15-25 01/10/2022    BACTERIA NONE 01/10/2022    RBCUA > 200 01/10/2022    BLOODU LARGE 01/10/2022    SPECGRAV 1.026 12/31/2021    GLUCOSEU NEGATIVE 01/10/2022       Radiology:  XR CHEST PORTABLE   Final Result   Stable radiographic appearance of the chest. No evidence of an acute process. **This report has been created using voice recognition software. It may contain minor errors which are inherent in voice recognition technology. **      Final report electronically signed by Dr. Luma Dick MD on 3/28/2022 2:10 PM      XR CHEST (2 VW)   Final Result   Stable radiographic appearance of the chest. No interval change since previous study dated 22 March 2022. Devang Pineda **This report has been created using voice recognition software. It may contain minor errors which are inherent in voice recognition technology. **      Final report electronically signed by DR West Velasquez on 3/27/2022 9:26 AM      XR NECK SOFT TISSUE   Final Result         1. The visualized airway is normal.   2. If the patient has persistent symptoms, CT scan may be helpful for better evaluation. .               **This report has been created using voice recognition software. It may contain minor errors which are inherent in voice recognition technology. **      Final report electronically signed by DR West Velasquez on 3/27/2022 9:29 AM          DVT prophylaxis: [] Lovenox                                 [x] SCDs                                 [] SQ Heparin [] Encourage ambulation           [] Already on Anticoagulation     Code Status: Full Code    PT/OT Eval Status: 3/28/22    Tele:   [] yes             [x] no    Electronically signed by Christiano Mcintosh DO on 3/30/2022 at 9:40 AM

## 2022-03-30 NOTE — PLAN OF CARE
Problem: OXYGENATION/RESPIRATORY FUNCTION  Goal: Patient will maintain patent airway  Outcome: Ongoing     Patient continues on high flow 40L 40% for humidification, patient tolerating well, no distress noted at this time,    Problem: OXYGENATION/RESPIRATORY FUNCTION  Goal: Patient will achieve/maintain normal respiratory rate/effort  Description: Respiratory rate and effort will be within normal limits for the patient  Outcome: Ongoing    Patient oxygenating well with spo2 >90%. No distress noted with respirations.

## 2022-03-30 NOTE — PLAN OF CARE
Patient continues on breathing treatments and high flow; tolerating well.   Will continue to monitor

## 2022-03-30 NOTE — FLOWSHEET NOTE
03/30/22 0940   Encounter Summary   Services provided to: Patient   Referral/Consult From: Rounding   Continue Visiting Yes  (3/30)   Complexity of Encounter Moderate   Length of Encounter 15 minutes   Routine   Type Initial   Assessment Calm; Approachable   Intervention Nurtured hope   Outcome Comfort;Expressed gratitude   Spiritual/Anglican   Type Spiritual support   Assessment: In my encounter with the 73 yr old patient, while rounding  the unit 4K. I provided spiritual care to patient through conversation, I also came to assess the patients spiritual needs present. The pt was admitted due to posterior glottic stenosis. Interventions:  I provided prayer, emotional support and words of comfort.  provided a listening presence and encouraged pt to share their beliefs and how they support him during their hospitalization. Outcomes: The patient was encouraged and didnt share any further spiritual needs at this time. Plan:  Chaplains will follow-up at a later time for assessment of any spiritual care needs present.

## 2022-03-31 LAB
ANION GAP SERPL CALCULATED.3IONS-SCNC: 10 MEQ/L (ref 8–16)
BUN BLDV-MCNC: 30 MG/DL (ref 7–22)
CALCIUM SERPL-MCNC: 9.3 MG/DL (ref 8.5–10.5)
CHLORIDE BLD-SCNC: 98 MEQ/L (ref 98–111)
CO2: 28 MEQ/L (ref 23–33)
CREAT SERPL-MCNC: 0.7 MG/DL (ref 0.4–1.2)
ERYTHROCYTE [DISTWIDTH] IN BLOOD BY AUTOMATED COUNT: 12.5 % (ref 11.5–14.5)
ERYTHROCYTE [DISTWIDTH] IN BLOOD BY AUTOMATED COUNT: 39.8 FL (ref 35–45)
GFR SERPL CREATININE-BSD FRML MDRD: 83 ML/MIN/1.73M2
GLUCOSE BLD-MCNC: 227 MG/DL (ref 70–108)
GLUCOSE BLD-MCNC: 247 MG/DL (ref 70–108)
GLUCOSE BLD-MCNC: 265 MG/DL (ref 70–108)
GLUCOSE BLD-MCNC: 352 MG/DL (ref 70–108)
GLUCOSE BLD-MCNC: 370 MG/DL (ref 70–108)
HCT VFR BLD CALC: 39.5 % (ref 37–47)
HEMOGLOBIN: 13.1 GM/DL (ref 12–16)
MCH RBC QN AUTO: 29 PG (ref 26–33)
MCHC RBC AUTO-ENTMCNC: 33.2 GM/DL (ref 32.2–35.5)
MCV RBC AUTO: 87.6 FL (ref 81–99)
PLATELET # BLD: 209 THOU/MM3 (ref 130–400)
PMV BLD AUTO: 10.1 FL (ref 9.4–12.4)
POTASSIUM SERPL-SCNC: 5.2 MEQ/L (ref 3.5–5.2)
RBC # BLD: 4.51 MILL/MM3 (ref 4.2–5.4)
SODIUM BLD-SCNC: 136 MEQ/L (ref 135–145)
WBC # BLD: 17.9 THOU/MM3 (ref 4.8–10.8)

## 2022-03-31 PROCEDURE — 6360000002 HC RX W HCPCS: Performed by: OTOLARYNGOLOGY

## 2022-03-31 PROCEDURE — 94761 N-INVAS EAR/PLS OXIMETRY MLT: CPT

## 2022-03-31 PROCEDURE — 98960 EDU&TRN PT SELF-MGMT NQHP 1: CPT

## 2022-03-31 PROCEDURE — 6360000002 HC RX W HCPCS: Performed by: NURSE PRACTITIONER

## 2022-03-31 PROCEDURE — 99233 SBSQ HOSP IP/OBS HIGH 50: CPT | Performed by: NURSE PRACTITIONER

## 2022-03-31 PROCEDURE — 94640 AIRWAY INHALATION TREATMENT: CPT

## 2022-03-31 PROCEDURE — 82948 REAGENT STRIP/BLOOD GLUCOSE: CPT

## 2022-03-31 PROCEDURE — 97530 THERAPEUTIC ACTIVITIES: CPT

## 2022-03-31 PROCEDURE — 94760 N-INVAS EAR/PLS OXIMETRY 1: CPT

## 2022-03-31 PROCEDURE — 85027 COMPLETE CBC AUTOMATED: CPT

## 2022-03-31 PROCEDURE — 2700000000 HC OXYGEN THERAPY PER DAY

## 2022-03-31 PROCEDURE — 2580000003 HC RX 258: Performed by: INTERNAL MEDICINE

## 2022-03-31 PROCEDURE — 97535 SELF CARE MNGMENT TRAINING: CPT

## 2022-03-31 PROCEDURE — 36415 COLL VENOUS BLD VENIPUNCTURE: CPT

## 2022-03-31 PROCEDURE — 6370000000 HC RX 637 (ALT 250 FOR IP): Performed by: INTERNAL MEDICINE

## 2022-03-31 PROCEDURE — 2060000000 HC ICU INTERMEDIATE R&B

## 2022-03-31 PROCEDURE — 99233 SBSQ HOSP IP/OBS HIGH 50: CPT | Performed by: INTERNAL MEDICINE

## 2022-03-31 PROCEDURE — 80048 BASIC METABOLIC PNL TOTAL CA: CPT

## 2022-03-31 PROCEDURE — 6370000000 HC RX 637 (ALT 250 FOR IP): Performed by: STUDENT IN AN ORGANIZED HEALTH CARE EDUCATION/TRAINING PROGRAM

## 2022-03-31 PROCEDURE — 2580000003 HC RX 258: Performed by: STUDENT IN AN ORGANIZED HEALTH CARE EDUCATION/TRAINING PROGRAM

## 2022-03-31 RX ORDER — INSULIN GLARGINE 100 [IU]/ML
25 INJECTION, SOLUTION SUBCUTANEOUS NIGHTLY
Status: DISCONTINUED | OUTPATIENT
Start: 2022-03-31 | End: 2022-03-31

## 2022-03-31 RX ORDER — SODIUM CHLORIDE, SODIUM LACTATE, POTASSIUM CHLORIDE, CALCIUM CHLORIDE 600; 310; 30; 20 MG/100ML; MG/100ML; MG/100ML; MG/100ML
INJECTION, SOLUTION INTRAVENOUS CONTINUOUS
Status: DISCONTINUED | OUTPATIENT
Start: 2022-03-31 | End: 2022-03-31

## 2022-03-31 RX ORDER — SODIUM CHLORIDE 9 MG/ML
INJECTION, SOLUTION INTRAVENOUS CONTINUOUS
Status: DISCONTINUED | OUTPATIENT
Start: 2022-03-31 | End: 2022-04-02

## 2022-03-31 RX ORDER — INSULIN GLARGINE 100 [IU]/ML
20 INJECTION, SOLUTION SUBCUTANEOUS NIGHTLY
Status: DISCONTINUED | OUTPATIENT
Start: 2022-03-31 | End: 2022-04-01

## 2022-03-31 RX ADMIN — DEXAMETHASONE SODIUM PHOSPHATE 6 MG: 4 INJECTION, SOLUTION INTRA-ARTICULAR; INTRALESIONAL; INTRAMUSCULAR; INTRAVENOUS; SOFT TISSUE at 16:54

## 2022-03-31 RX ADMIN — INSULIN LISPRO 4 UNITS: 100 INJECTION, SOLUTION INTRAVENOUS; SUBCUTANEOUS at 09:17

## 2022-03-31 RX ADMIN — SODIUM CHLORIDE: 9 INJECTION, SOLUTION INTRAVENOUS at 23:32

## 2022-03-31 RX ADMIN — FAMOTIDINE 20 MG: 20 TABLET ORAL at 09:16

## 2022-03-31 RX ADMIN — INSULIN LISPRO 5 UNITS: 100 INJECTION, SOLUTION INTRAVENOUS; SUBCUTANEOUS at 12:19

## 2022-03-31 RX ADMIN — METOPROLOL SUCCINATE 25 MG: 25 TABLET, FILM COATED, EXTENDED RELEASE ORAL at 09:17

## 2022-03-31 RX ADMIN — HYDROXYZINE PAMOATE 25 MG: 25 CAPSULE ORAL at 03:51

## 2022-03-31 RX ADMIN — IPRATROPIUM BROMIDE AND ALBUTEROL SULFATE 1 AMPULE: .5; 3 SOLUTION RESPIRATORY (INHALATION) at 13:27

## 2022-03-31 RX ADMIN — INSULIN LISPRO 15 UNITS: 100 INJECTION, SOLUTION INTRAVENOUS; SUBCUTANEOUS at 12:20

## 2022-03-31 RX ADMIN — FAMOTIDINE 20 MG: 20 TABLET ORAL at 20:29

## 2022-03-31 RX ADMIN — BUDESONIDE 500 MCG: 0.5 INHALANT RESPIRATORY (INHALATION) at 08:11

## 2022-03-31 RX ADMIN — SODIUM CHLORIDE, POTASSIUM CHLORIDE, SODIUM LACTATE AND CALCIUM CHLORIDE: 600; 310; 30; 20 INJECTION, SOLUTION INTRAVENOUS at 09:09

## 2022-03-31 RX ADMIN — INSULIN LISPRO 6 UNITS: 100 INJECTION, SOLUTION INTRAVENOUS; SUBCUTANEOUS at 16:57

## 2022-03-31 RX ADMIN — INSULIN LISPRO 5 UNITS: 100 INJECTION, SOLUTION INTRAVENOUS; SUBCUTANEOUS at 20:50

## 2022-03-31 RX ADMIN — INSULIN GLARGINE 20 UNITS: 100 INJECTION, SOLUTION SUBCUTANEOUS at 20:30

## 2022-03-31 RX ADMIN — INSULIN LISPRO 5 UNITS: 100 INJECTION, SOLUTION INTRAVENOUS; SUBCUTANEOUS at 09:19

## 2022-03-31 RX ADMIN — BUSPIRONE HYDROCHLORIDE 10 MG: 10 TABLET ORAL at 09:16

## 2022-03-31 RX ADMIN — INSULIN LISPRO 5 UNITS: 100 INJECTION, SOLUTION INTRAVENOUS; SUBCUTANEOUS at 16:54

## 2022-03-31 RX ADMIN — Medication 6 MG: at 20:33

## 2022-03-31 RX ADMIN — FUROSEMIDE 40 MG: 40 TABLET ORAL at 09:16

## 2022-03-31 RX ADMIN — GUAIFENESIN 600 MG: 600 TABLET, EXTENDED RELEASE ORAL at 09:16

## 2022-03-31 RX ADMIN — DEXAMETHASONE SODIUM PHOSPHATE 6 MG: 4 INJECTION, SOLUTION INTRA-ARTICULAR; INTRALESIONAL; INTRAMUSCULAR; INTRAVENOUS; SOFT TISSUE at 02:25

## 2022-03-31 RX ADMIN — SODIUM CHLORIDE, PRESERVATIVE FREE 10 ML: 5 INJECTION INTRAVENOUS at 09:19

## 2022-03-31 RX ADMIN — BUDESONIDE 500 MCG: 0.5 INHALANT RESPIRATORY (INHALATION) at 20:02

## 2022-03-31 RX ADMIN — IPRATROPIUM BROMIDE AND ALBUTEROL SULFATE 1 AMPULE: .5; 3 SOLUTION RESPIRATORY (INHALATION) at 08:11

## 2022-03-31 RX ADMIN — BUSPIRONE HYDROCHLORIDE 10 MG: 10 TABLET ORAL at 16:53

## 2022-03-31 RX ADMIN — ROSUVASTATIN CALCIUM 10 MG: 10 TABLET, FILM COATED ORAL at 20:29

## 2022-03-31 RX ADMIN — CALCIUM POLYCARBOPHIL 625 MG: 625 TABLET, FILM COATED ORAL at 09:17

## 2022-03-31 RX ADMIN — BUSPIRONE HYDROCHLORIDE 10 MG: 10 TABLET ORAL at 20:29

## 2022-03-31 RX ADMIN — GUAIFENESIN 600 MG: 600 TABLET, EXTENDED RELEASE ORAL at 20:29

## 2022-03-31 RX ADMIN — IPRATROPIUM BROMIDE AND ALBUTEROL SULFATE 1 AMPULE: .5; 3 SOLUTION RESPIRATORY (INHALATION) at 19:58

## 2022-03-31 RX ADMIN — SODIUM CHLORIDE, POTASSIUM CHLORIDE, SODIUM LACTATE AND CALCIUM CHLORIDE: 600; 310; 30; 20 INJECTION, SOLUTION INTRAVENOUS at 17:00

## 2022-03-31 ASSESSMENT — PAIN SCALES - GENERAL
PAINLEVEL_OUTOF10: 0

## 2022-03-31 ASSESSMENT — PAIN - FUNCTIONAL ASSESSMENT
PAIN_FUNCTIONAL_ASSESSMENT: 0-10
PAIN_FUNCTIONAL_ASSESSMENT: 0-10

## 2022-03-31 NOTE — PROGRESS NOTES
Hospitalist Progress Note    Patient:  Uriel Arellano    YOB: 1950  Unit/Bed:4K-01/001-A  MRN: [id number]    Acct: [de-identified]   PCP: Gwenlyn Goodell    Date of Admission: 3/27/2022      Assessment/Plan:   Acute on chronic hypoxic respiratory failure secondary to glottic stenosis. On 2 L at baseline. o Continue supplemental O2 and wean as able.  Glottic stenosis: As a sequelae of prior Covid infection and intubation, status post suspension laryngoscopy with jet ventilation on 3/22, presenting again with worsening stridor on 3/27 status post suspension microlaryngoscopy with balloon dilation and jet vent, Kenalog injection, and flexible nasopharyngosscope 3/28  o Continue on high-dose Decadron every 8 hours per ENT  i. GI ppx with pepcid  o Reevaluation with laryngoscopy on 4/1 by ENT with possible trach tube placement - npo and IVF   Type 2 diabetes: not previously insulin-dependent now requiring insulin due to hyperglycemia induced by steroids. o Titrate insulin, increase Lantus to 25 units give early this evening as patient will be n.p.o. tomorrow for procedure  o Continue 5 units with meals and sliding dose. o Diabetic diet.  Mild hyperkalemia: May be a component of solute drag from hyperglycemia, expect to improve with improved glucose control.  Leukocytosis: Suspect secondary to steroid use, no evidence of infection at this time.  CAD status post PCI 2008: Continue Crestor and beta-blocker. Would benefit from resumption of aspirin after procedure.  History of HFrEF during Covid admission, resolved on most recent echo   Acute on chronic anxiety: Started on low-dose BuSpar.   Chronic Conditions (reviewed and stable unless otherwise stated)           Expected discharge date: >2 days    Disposition: pending progress  [] Home  [] TCU  [] Rehab  [] Psych  [] SNF  [] [facility]  [] Other-    ===================================================================      Chief Complaint: SOB    Hospital Course: Patient with glottic stenosis from prior intubation from covid, presents on 3.27 with increasing stridor after recent laryngoscopy and jet ventilation 3/22/22. Subjective (past 24 hours): Patient seen at bedside, she reports she feels stable and no new issues overnight. Does have occasional coughing but denies any shortness of breath at this time. Still has stridor but no significant change either. Denies any nausea, vomiting, diarrhea or constipation. Sugars remain high, have adjusted regimen.       Medications:  Reviewed    Infusion Medications    [Held by provider] lactated ringers 50 mL/hr at 03/31/22 0909    sodium chloride      dextrose       Scheduled Medications    insulin lispro  0-18 Units SubCUTAneous TID WC    insulin lispro  0-9 Units SubCUTAneous Nightly    insulin glargine  25 Units SubCUTAneous Nightly    dexamethasone  6 mg IntraVENous Q12H    ipratropium-albuterol  1 ampule Inhalation TID    insulin lispro  5 Units SubCUTAneous TID AC    busPIRone  10 mg Oral TID    budesonide  0.5 mg Nebulization BID    sodium chloride flush  5-40 mL IntraVENous 2 times per day    [Held by provider] aspirin  81 mg Oral Daily    famotidine  20 mg Oral BID    furosemide  40 mg Oral Daily    melatonin  6 mg Oral Nightly    polycarbophil  625 mg Oral Daily    rosuvastatin  10 mg Oral Nightly    guaiFENesin  600 mg Oral BID    metoprolol succinate  25 mg Oral Daily     PRN Meds: sodium chloride flush, sodium chloride, ondansetron **OR** ondansetron, polyethylene glycol, acetaminophen **OR** acetaminophen, racepinephrine HCl, sodium chloride nebulizer, diclofenac sodium, hydrOXYzine, nitroGLYCERIN, senna, glucagon (rDNA), dextrose, glucose, dextrose bolus (hypoglycemia) **OR** dextrose bolus (hypoglycemia)      ROS: reviewed from prior note, full ROS unchanged unless otherwise stated in hospital course/subjective portion. Intake/Output Summary (Last 24 hours) at 3/31/2022 1630  Last data filed at 3/31/2022 1332  Gross per 24 hour   Intake 780 ml   Output 600 ml   Net 180 ml       Exam:  /80   Pulse 65   Temp 98.1 °F (36.7 °C) (Oral)   Resp 14   Ht 5' 2\" (1.575 m)   Wt 189 lb 9.5 oz (86 kg)   LMP  (LMP Unknown)   SpO2 100%   BMI 34.68 kg/m²     General appearance: Overweight, comfortable appearing at this time  Eyes:  PERRL. Conjunctivae/corneas clear. HENT: Head normal appearing. Nares normal. Oral mucosa moist.  Hearing intact. Neck: Supple, with full range of motion. Trachea midline. No gross JVD appreciated. Respiratory: Inspiratory greater than expiratory stridor, overall breath sounds shallow however remained grossly unchanged from previous days. No rhonchi or rales. Hoarseness of voice noted. Cardiovascular: Normal rate, regular rhythm with normal S1/S2 without murmurs. No lower extremity edema. Abdomen: Soft, non-tender, non-distended with normal bowel sounds. Musculoskeletal: No joint swelling or tenderness. Normal tone. No abnormal movements. Skin: Warm and dry. No rashes or lesions. Neurologic:  No focal sensory/motor deficits in the upper or lower extremities. Cranial nerves:  grossly non-focal 2-12. Psychiatric: Alert and oriented, normal insight and thought content. Capillary Refill: Brisk,< 3 seconds. Peripheral Pulses: +2 palpable, equal bilaterally. Labs:   Recent Labs     03/29/22  0410 03/30/22  0451 03/31/22  0830   WBC 19.9* 26.3* 17.9*   HGB 14.0 14.2 13.1   HCT 43.8 43.6 39.5    257 209     Recent Labs     03/29/22  0410 03/30/22  0451 03/31/22  0830    135 136   K 4.4 5.3* 5.2    97* 98   CO2 23 29 28   BUN 16 31* 30*   CREATININE 0.5 0.8 0.7   CALCIUM 9.5 10.0 9.3     No results for input(s): AST, ALT, BILIDIR, BILITOT, ALKPHOS in the last 72 hours.   No results for input(s): INR in the

## 2022-03-31 NOTE — CARE COORDINATION
Collaborative Discharge Planning    Quin Russ  :  1950  MRN:  [id number]    ADMIT DATE:  3/27/2022      Discharge Planning Discharge Planning  Living Arrangements: Spouse/Significant Other  Support Systems: Family Members  DME: [facility]  Type of Home Care Services: Nursing Services  Patient expects to be discharged to[de-identified] LTAC  White Board Notes /Social Work Whiteboard Notes  /Social Work Whiteboard: 3/29 CM: From home w/. Has home O2 through [address] [address] trach this week. Open to [facility]. Procedure   3/28 Suspension Microlaryngoscopy w Balloon Dilation and Jet Ventilaton, Kenalog Injection, Flexibile Nasolaryngosocpy    Discharge Plan Home with family  /Social Work Whiteboard: 3/29 CM: From home w/. Has home O2 through [address] [address] trach this week. Open to [facility].     Discharge Milestones and Delays: Clinical status  From 3B. History ARDS, Covid infection.     40% FIO2/35L HF Oxygen, IV Steroids continued.  ENT plans  new tracheostomy for glottic stenosis     SIGNED:  Barrett Levi RN   3/31/2022, 8:07 AM

## 2022-03-31 NOTE — PROGRESS NOTES
Patient and Ani Rubalcava was given tracheostomy care instructions. Handouts were provided to the individual(s) trained. All checked boxes were discussed with all skills demonstrated back to the respiratory care practitioner. [x] Normal anatomy of the respiratory tract and the process of respiration  [x] How a tracheostomy tube changes the breathing process  [x] A review of the surgical procedure and placement of tracheostomy tube   [x] Importance of humidification to the tracheostomy, and how to accomplish this at home   [x] Resuming activities at home and what activities to avoid   [x] Communication needs of the patient  [x] Cleaning of the stoma site and how to change the dressing   [x] Proper suctioning technique   [x] Cleaning of equipment and sterilization of water   [x] Cleaning and placement of a reusable inner cannula   [x] Replacement of the tracheostomy tube   [x] Inflation and deflation of the tracheostomy tube cuff   [x] A glossary of terms associated with tracheostomy care     Patient and Guardian understanding of the tracheostomy teaching was good.

## 2022-03-31 NOTE — PLAN OF CARE
Problem: Falls - Risk of:  Goal: Will remain free from falls  Description: Will remain free from falls  3/30/2022 2251 by Guillermo Stein RN  Outcome: Ongoing  3/30/2022 1336 by Meka Lewis RN  Outcome: Ongoing  Note: Call light and bedside table within reach. Standby assist to walk in room. Goal: Absence of physical injury  Description: Absence of physical injury  3/30/2022 2251 by Guillermo Stein RN  Outcome: Ongoing  3/30/2022 1336 by Meka Lewis RN  Outcome: Ongoing  Note: Patient turns self, belongings within reach, and needs anticipated. Problem: Daily Care:  Goal: Daily care needs are met  Description: Daily care needs are met  3/30/2022 2251 by Guillermo Stein RN  Outcome: Ongoing  3/30/2022 1336 by Meka Lewis RN  Outcome: Ongoing  Note: Patient assisted as needed to wash self and brush teeth today. Problem: Pain:  Goal: Pain level will decrease  Description: Pain level will decrease  3/30/2022 2251 by Guillermo Stein RN  Outcome: Ongoing  3/30/2022 1336 by Meka Lewis RN  Outcome: Ongoing  Note: Pain level assessed hourly and interventions completed as appropriate  Goal: Control of acute pain  Description: Control of acute pain  Outcome: Ongoing  Goal: Control of chronic pain  Description: Control of chronic pain  Outcome: Ongoing     Problem: Skin Integrity:  Goal: Skin integrity will stabilize  Description: Skin integrity will stabilize  3/30/2022 2251 by Guillermo Stein RN  Outcome: Ongoing  3/30/2022 1336 by Meka Lewis RN  Outcome: Ongoing  Note: Skin assessed and kept clear of harm. Nutrition encouraged. Turns self. Pillow support offered.      Problem: Discharge Planning:  Goal: Patients continuum of care needs are met  Description: Patients continuum of care needs are met  3/30/2022 2251 by Guillermo Stein RN  Outcome: Ongoing  3/30/2022 1336 by Meka Lewis RN  Outcome: Ongoing  Note: Interdisciplinary rounds completed and plan of care assessed to best fit patients needs and goal      Problem: OXYGENATION/RESPIRATORY FUNCTION  Goal: Patient will maintain patent airway  3/30/2022 2251 by Carole Ramirez RN  Outcome: Ongoing  3/30/2022 1336 by Isaiah Silverman RN  Outcome: Ongoing  Note: Airway and Sp02 assessed. Heated high flow on for patient comfort and moisture. Goal: Patient will achieve/maintain normal respiratory rate/effort  Description: Respiratory rate and effort will be within normal limits for the patient  3/30/2022 2251 by Carole Ramirez RN  Outcome: Ongoing  3/30/2022 1336 by Isaiah Silverman RN  Outcome: Ongoing  Note: Respiratory rate observed, lung sounds auscultated. Problem: Skin Integrity:  Goal: Will show no infection signs and symptoms  Description: Will show no infection signs and symptoms  3/30/2022 2251 by Carole Ramirez RN  Outcome: Ongoing  3/30/2022 1336 by Isaiah Silverman RN  Outcome: Ongoing  Note: HR, temperature, and skin integrity monitored  Goal: Absence of new skin breakdown  Description: Absence of new skin breakdown  3/30/2022 2251 by Carole Ramirez RN  Outcome: Ongoing  3/30/2022 1336 by Isaiah Silverman RN  Outcome: Ongoing  Note: Skin assessed Q4hrs and as needed- no skin breakdown at this time.      Problem: RESPIRATORY  Goal: Clear lung sounds  Description: Clear lung sounds  3/30/2022 2107 by Keyona Reveles RCP  Outcome: Ongoing

## 2022-03-31 NOTE — PLAN OF CARE
Problem: RESPIRATORY  Goal: Clear lung sounds  Description: Clear lung sounds  Outcome: Ongoing   Breath sounds are inspiratory and expiratory wheezes at this time. Continue with treatments to help improve breath sounds.

## 2022-03-31 NOTE — PROGRESS NOTES
[facility]  Occupational Therapy  Daily Note  Time:   Time In: 4934  Time Out: 1046  Timed Code Treatment Minutes: 87 Minutes  Minutes: 87          Date: 3/31/2022  Patient Name: Trent Lindo,   Gender: female      Room: [facility]-A  MRN: [id number]  : 1950  (73 y.o.)  Referring Practitioner: Dawna Hinojosa DO  Diagnosis: Shortness of breath  Additional Pertinent Hx: The patient is a 73 y.o. female who was admitted to [address] [place] [address] late last evening. 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 Patient was discharged from hospital on 3/23/2022 with significant improvement in her breathing. Patient reports that she continues on supplemental oxygen at home. She reports progressive shortness of breath and stridor over the weekend. She states that she truly felt she could not breathe and was not sure if she would live while traveling to the ER last evening. Patient has history of hospitalization here at [facility] from 2021-2022 for severe COVID-19 infection with respiratory failure. She failed HFNC and BiPAP progressing to need for endotracheal intubation on 2021 secondary to severe ARDS. She was extubated on 2022. Patient reports hoarse voice following extubation. She was transferred to inpatient rehab from following acute hospital stay. She was discharged on supplemental oxygen. She had ER visit on 3/9/2022 for shortness of breath, then presented again on 3/18/2022 for shortness of breath with admission. Restrictions/Precautions:  Restrictions/Precautions: Fall Risk     SUBJECTIVE: Patient seated in bedside chair upon arrival; agreeable to therapy this date. Patient requests to take shower.   Respiratory approves HF to be removed with nasal cannula at 6L to be utilized. Increased session time d/t increased time needed for activities d/t SOB. Patient states she will be getting a trach tomorrow. PAIN: 0/10:     Vitals: Oxygen: 6L nasal canula throughout maintained >98% throughout. HF returned end of session. COGNITION: Decreased Problem Solving    ADL:   Grooming: Minimal Assistance and with increased time for completion. assistance with thoroughness of rinsing hair, patient able to stand at sink in order to brush teeth/hair  Bathing: Minimal Assistance. BLE  Upper Extremity Dressing: Minimal Assistance. doff/jess gown  Lower Extremity Dressing: Moderate Assistance. jess L sock  Shower Transfer: Stand By Assistance. shower bench. BALANCE:  Sitting Balance:  Supervision. Standing Balance: Supervision. no device    BED MOBILITY:  Not Tested    TRANSFERS:  Sit to Stand:  Supervision. Stand to Sit: Supervision. FUNCTIONAL MOBILITY:  Assistive Device: None  Assist Level:  Stand By Assistance. Distance: To and from bathroom  Able to manage O2 tubing independently, steady pace, no LOB     ADDITIONAL ACTIVITIES:  Patient able to provide examples and apply with activities this date for energy conservation techniques in order to increase independence with ADLs/IADLs. ASSESSMENT:     Activity Tolerance:  Patient tolerance of  treatment: good.        Discharge Recommendations: Continue to assess pending progress  Equipment Recommendations: Equipment Needed: No  Plan: Times per week: 5x  Current Treatment Recommendations: Strengthening,Functional Mobility Training,Endurance Training,Balance Training,Patient/Caregiver Education & Training,Self-Care / ADL,Safety Education & Training    Patient Education  Patient Education: Role of OT, Plan of Care, ADL's, Energy Conservation, Home Safety and Importance of Increasing Activity    Goals  Short term goals  Time Frame for Short term goals: Until discharge  Short term goal 1: Pt will complete BUE light resistive exercises with 0 vcs for technique while O2 stats maintain above 90% to increase endurance with self cares. Short term goal 2: Pt will complete BADL routine with S and 0 vcs for safety while demonstrating EC techniques to increase endurance in home environment. Short term goal 3: Pt will complete functional mobility to/from BR and HH distances with S and 0 vcs for safety to increase indep and endurance with self cares. Following session, patient left in safe position with all fall risk precautions in place.

## 2022-03-31 NOTE — PROGRESS NOTES
Department of Otolaryngology  Progress Note    Chief Complaint:  Dyspnea, stridor    SUBJECTIVE:  No acute events overnight. Patient reports her breathing feels about the same today; less anxious. Decadron was decreased to 6mg every 12 hours yesterday. Continues on budesonide nebs. Unasyn discontinued yesterday. RT in room doing some pre-tracheostomy teaching; patient and  with excellent engagement. A complete multi-organ review of systems was performed using a new patient questionnaire, and reviewed by me. ENT:  negative except as noted in HPI  CONSTITUTIONAL:  negative except as noted in HPI  EYES:  negative except as noted in HPI  RESPIRATORY:  negative except as noted in HPI  CARDIOVASCULAR:  negative except as noted in HPI  GASTROINTESTINAL:  negative except as noted in HPI  GENITOURINARY:  negative except as noted in HPI  MUSCULOSKELETAL:  negative except as noted in HPI  SKIN:  negative except as noted in HPI  ENDOCRINE/METABOLIC: negative except as noted in HPI  HEMATOLOGIC/LYMPHATIC:  negative except as noted in HPI  ALLERGY/IMMUN: negative except as noted in HPI  NEUROLOGICAL:  negative except as noted in HPI  BEHAVIOR/PSYCH:  negative except as noted in HPI    OBJECTIVE      Physical  VITALS:  /75   Pulse 62   Temp 98 °F (36.7 °C) (Oral)   Resp 18   Ht 5' 2\" (1.575 m)   Wt 189 lb 9.5 oz (86 kg)   LMP  (LMP Unknown)   SpO2 100%   BMI 34.68 kg/m²     ABNORMAL OTOLARYNGOLOGIC EXAM FINDINGS: Mild airflow turbulence with inspiration and expiration while talking. No problem swallowing. Voice remains breathy, weak, and rough.     OTHER PERTINENT EXAM FINDINGS:  GENERAL: Awake, NAD, Non-toxic appearing. Patient is alert and oriented toperson, place and time. Not obviously hearing impaired.   NEUROLOGICAL: GCS 15, Cranial nerves II-VI, VIII-XII grossly intact,  Facial nerve (VII) w/ House-Brackman Grade 1 of 6 bilaterally, No evidence of nystagmus or gross asymmetry    PSYCHIATRIC: Mood and Affect appropriate. HEAD: NC/AT  SKIN: No overtly ulcerated, cellulitic, or indurated lesions of the head or neck. EARS: Pinna well-formed  NOSE: Dorsum w/o scar or deformity  ORAL CAVITY: No mucosal masses/lesions appreciated,    OROPHARYNX: No mucosal masses or lesions appreciated. NECK: Soft, supple. No crepitus or masses appreciated,  Trachea midline. CHEST: Breathing is mildly labored, but without retraction. With increased respiratory effort, comes increased biphasic stridor.      Data  CBC with Differential:    Lab Results   Component Value Date    WBC 17.9 03/31/2022    RBC 4.51 03/31/2022    HGB 13.1 03/31/2022    HCT 39.5 03/31/2022     03/31/2022    MCV 87.6 03/31/2022    MCH 29.0 03/31/2022    MCHC 33.2 03/31/2022    NRBC 0 03/27/2022    SEGSPCT 65.9 03/27/2022    MONOPCT 7.6 03/27/2022    MONOSABS 1.1 03/27/2022    LYMPHSABS 3.2 03/27/2022    EOSABS 0.1 03/27/2022    BASOSABS 0.1 03/27/2022     BMP:    Lab Results   Component Value Date     03/31/2022    K 5.2 03/31/2022    K 5.0 03/23/2022    CL 98 03/31/2022    CO2 28 03/31/2022    BUN 30 03/31/2022    LABALBU 4.3 03/18/2022    CREATININE 0.7 03/31/2022    CALCIUM 9.3 03/31/2022    LABGLOM 83 03/31/2022    GLUCOSE 265 03/31/2022       Inpatient Medications  Current Facility-Administered Medications: dexamethasone (DECADRON) injection 6 mg, 6 mg, IntraVENous, Q12H  ipratropium-albuterol (DUONEB) nebulizer solution 1 ampule, 1 ampule, Inhalation, TID  insulin glargine (LANTUS) injection vial 20 Units, 20 Units, SubCUTAneous, Nightly  [Held by provider] lactated ringers infusion, , IntraVENous, Continuous  insulin lispro (HUMALOG) injection vial 0-6 Units, 0-6 Units, SubCUTAneous, TID WC  insulin lispro (HUMALOG) injection vial 0-3 Units, 0-3 Units, SubCUTAneous, Nightly  insulin lispro (HUMALOG) injection vial 5 Units, 5 Units, SubCUTAneous, TID AC  busPIRone (BUSPAR) tablet 10 mg, 10 mg, Oral, TID  budesonide (PULMICORT) nebulizer suspension 500 mcg, 0.5 mg, Nebulization, BID  sodium chloride flush 0.9 % injection 5-40 mL, 5-40 mL, IntraVENous, 2 times per day  sodium chloride flush 0.9 % injection 5-40 mL, 5-40 mL, IntraVENous, PRN  0.9 % sodium chloride infusion, 25 mL, IntraVENous, PRN  ondansetron (ZOFRAN-ODT) disintegrating tablet 4 mg, 4 mg, Oral, Q8H PRN **OR** ondansetron (ZOFRAN) injection 4 mg, 4 mg, IntraVENous, Q6H PRN  polyethylene glycol (GLYCOLAX) packet 17 g, 17 g, Oral, Daily PRN  acetaminophen (TYLENOL) tablet 650 mg, 650 mg, Oral, Q6H PRN **OR** acetaminophen (TYLENOL) suppository 650 mg, 650 mg, Rectal, Q6H PRN  racepinephrine HCl (VAPONEFPRIN) 2.25 % nebulizer solution NEBU 11.25 mg, 11.25 mg, Nebulization, Q4H PRN  sodium chloride nebulizer 0.9 % solution 3 mL, 3 mL, Nebulization, Q4H PRN  [Held by provider] aspirin chewable tablet 81 mg, 81 mg, Oral, Daily  diclofenac sodium (VOLTAREN) 1 % gel 2 g, 2 g, Topical, 4x Daily PRN  famotidine (PEPCID) tablet 20 mg, 20 mg, Oral, BID  furosemide (LASIX) tablet 40 mg, 40 mg, Oral, Daily  hydrOXYzine (VISTARIL) capsule 25 mg, 25 mg, Oral, 4x Daily PRN  melatonin tablet 6 mg, 6 mg, Oral, Nightly  nitroGLYCERIN (NITROSTAT) SL tablet 0.4 mg, 0.4 mg, SubLINGual, Q5 Min PRN  polycarbophil (FIBERCON) tablet 625 mg, 625 mg, Oral, Daily  rosuvastatin (CRESTOR) tablet 10 mg, 10 mg, Oral, Nightly  senna (SENOKOT) tablet 8.6 mg, 1 tablet, Oral, BID PRN  guaiFENesin (MUCINEX) extended release tablet 600 mg, 600 mg, Oral, BID  metoprolol succinate (TOPROL XL) extended release tablet 25 mg, 25 mg, Oral, Daily  glucagon (rDNA) injection 1 mg, 1 mg, IntraMUSCular, PRN  dextrose 5 % solution, 100 mL/hr, IntraVENous, PRN  glucose chewable tablet 4 each, 4 tablet, Oral, PRN  dextrose bolus (hypoglycemia) 10% 125 mL, 125 mL, IntraVENous, PRN **OR** dextrose bolus (hypoglycemia) 10% 250 mL, 250 mL, IntraVENous, PRN    ASSESSMENT AND PLAN    Lulu Aguilar is a 81-year-old patient with glottic stenosis of unclear etiology. She is now postop day 2 from suspension microlaryngoscopy with airway balloon dilation, and steroid injection. Her airway dilated adequately and there appeared to be minimal scarring around her cricoarytenoid complex. Culture of the area on the right-hand side was negative. Flexible nasolaryngoscopy prior to surgery and after surgery; there was no appreciable improvement in her glottic aperture. 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 Perhaps there is a component of paroxysmal vocal fold motion as anxiety certainly seems to worsen her breathing, as she has attacks where her breathing is acutely worse that are relieved by anxiolytics. She is also perseverating on the role that Mucinex plays in her breathing. I tried to explain the Mucinex neither helps nor hurts her ability to move her vocal cords. Respiratory distress; Glottic stenosis, unknown etiology  - Continue high-dose steroids  - Continue 60 L high flow nasal cannula  - Continuous pulse oximetry  - Okay for regular (diabetic) diet again today. - Discussed with patient and  the potential need for tracheostomy to provide an adequate airway and give her vocal folds a chance to begin moving. We do not think her current airway is adequate enough for discharge home. We do not think we will be able to successfully wean her high flow nasal cannula or steroids. Plan for tracheostomy placement Friday afternoon. Patient and  report understanding and are both comfortable and sure of the decision to proceed with tracheostomy.     Management of patient's care was collaborated with Dr Nancy Lei today.       Electronically signed by WYATT Dowd CNP on 3/31/2022 at 9:16 AM

## 2022-04-01 ENCOUNTER — ANESTHESIA (OUTPATIENT)
Dept: OPERATING ROOM | Age: 72
DRG: 011 | End: 2022-04-01
Payer: MEDICARE

## 2022-04-01 ENCOUNTER — ANESTHESIA EVENT (OUTPATIENT)
Dept: OPERATING ROOM | Age: 72
DRG: 011 | End: 2022-04-01
Payer: MEDICARE

## 2022-04-01 VITALS — SYSTOLIC BLOOD PRESSURE: 73 MMHG | TEMPERATURE: 96.8 F | DIASTOLIC BLOOD PRESSURE: 50 MMHG | OXYGEN SATURATION: 100 %

## 2022-04-01 LAB
ANION GAP SERPL CALCULATED.3IONS-SCNC: 10 MEQ/L (ref 8–16)
BUN BLDV-MCNC: 32 MG/DL (ref 7–22)
CALCIUM SERPL-MCNC: 9.1 MG/DL (ref 8.5–10.5)
CHLORIDE BLD-SCNC: 101 MEQ/L (ref 98–111)
CO2: 28 MEQ/L (ref 23–33)
CREAT SERPL-MCNC: 0.6 MG/DL (ref 0.4–1.2)
GFR SERPL CREATININE-BSD FRML MDRD: > 90 ML/MIN/1.73M2
GLUCOSE BLD-MCNC: 109 MG/DL (ref 70–108)
GLUCOSE BLD-MCNC: 198 MG/DL (ref 70–108)
GLUCOSE BLD-MCNC: 224 MG/DL (ref 70–108)
GLUCOSE BLD-MCNC: 273 MG/DL (ref 70–108)
GLUCOSE BLD-MCNC: 281 MG/DL (ref 70–108)
POTASSIUM SERPL-SCNC: 4.6 MEQ/L (ref 3.5–5.2)
SODIUM BLD-SCNC: 139 MEQ/L (ref 135–145)

## 2022-04-01 PROCEDURE — 31600 PLANNED TRACHEOSTOMY: CPT | Performed by: OTOLARYNGOLOGY

## 2022-04-01 PROCEDURE — 82948 REAGENT STRIP/BLOOD GLUCOSE: CPT

## 2022-04-01 PROCEDURE — 97530 THERAPEUTIC ACTIVITIES: CPT

## 2022-04-01 PROCEDURE — 7100000000 HC PACU RECOVERY - FIRST 15 MIN: Performed by: OTOLARYNGOLOGY

## 2022-04-01 PROCEDURE — 6360000002 HC RX W HCPCS: Performed by: NURSE PRACTITIONER

## 2022-04-01 PROCEDURE — 3700000001 HC ADD 15 MINUTES (ANESTHESIA): Performed by: OTOLARYNGOLOGY

## 2022-04-01 PROCEDURE — 6370000000 HC RX 637 (ALT 250 FOR IP): Performed by: INTERNAL MEDICINE

## 2022-04-01 PROCEDURE — 2060000000 HC ICU INTERMEDIATE R&B

## 2022-04-01 PROCEDURE — 2500000003 HC RX 250 WO HCPCS: Performed by: NURSE ANESTHETIST, CERTIFIED REGISTERED

## 2022-04-01 PROCEDURE — 2500000003 HC RX 250 WO HCPCS: Performed by: OTOLARYNGOLOGY

## 2022-04-01 PROCEDURE — 99233 SBSQ HOSP IP/OBS HIGH 50: CPT | Performed by: INTERNAL MEDICINE

## 2022-04-01 PROCEDURE — 3600000002 HC SURGERY LEVEL 2 BASE: Performed by: OTOLARYNGOLOGY

## 2022-04-01 PROCEDURE — 2700000000 HC OXYGEN THERAPY PER DAY

## 2022-04-01 PROCEDURE — 97110 THERAPEUTIC EXERCISES: CPT

## 2022-04-01 PROCEDURE — 2709999900 HC NON-CHARGEABLE SUPPLY: Performed by: OTOLARYNGOLOGY

## 2022-04-01 PROCEDURE — 80048 BASIC METABOLIC PNL TOTAL CA: CPT

## 2022-04-01 PROCEDURE — 7100000001 HC PACU RECOVERY - ADDTL 15 MIN: Performed by: OTOLARYNGOLOGY

## 2022-04-01 PROCEDURE — 3700000000 HC ANESTHESIA ATTENDED CARE: Performed by: OTOLARYNGOLOGY

## 2022-04-01 PROCEDURE — 2580000003 HC RX 258: Performed by: STUDENT IN AN ORGANIZED HEALTH CARE EDUCATION/TRAINING PROGRAM

## 2022-04-01 PROCEDURE — 6360000002 HC RX W HCPCS: Performed by: OTOLARYNGOLOGY

## 2022-04-01 PROCEDURE — 94640 AIRWAY INHALATION TREATMENT: CPT

## 2022-04-01 PROCEDURE — 6360000002 HC RX W HCPCS: Performed by: NURSE ANESTHETIST, CERTIFIED REGISTERED

## 2022-04-01 PROCEDURE — 2580000003 HC RX 258: Performed by: INTERNAL MEDICINE

## 2022-04-01 PROCEDURE — 6370000000 HC RX 637 (ALT 250 FOR IP): Performed by: STUDENT IN AN ORGANIZED HEALTH CARE EDUCATION/TRAINING PROGRAM

## 2022-04-01 PROCEDURE — 36415 COLL VENOUS BLD VENIPUNCTURE: CPT

## 2022-04-01 PROCEDURE — 3600000012 HC SURGERY LEVEL 2 ADDTL 15MIN: Performed by: OTOLARYNGOLOGY

## 2022-04-01 PROCEDURE — 0B113F4 BYPASS TRACHEA TO CUTANEOUS WITH TRACHEOSTOMY DEVICE, PERCUTANEOUS APPROACH: ICD-10-PCS | Performed by: OTOLARYNGOLOGY

## 2022-04-01 PROCEDURE — 6370000000 HC RX 637 (ALT 250 FOR IP)

## 2022-04-01 RX ORDER — DEXAMETHASONE SODIUM PHOSPHATE 10 MG/ML
INJECTION, EMULSION INTRAMUSCULAR; INTRAVENOUS PRN
Status: DISCONTINUED | OUTPATIENT
Start: 2022-04-01 | End: 2022-04-01 | Stop reason: SDUPTHER

## 2022-04-01 RX ORDER — MIDAZOLAM HYDROCHLORIDE 1 MG/ML
INJECTION INTRAMUSCULAR; INTRAVENOUS PRN
Status: DISCONTINUED | OUTPATIENT
Start: 2022-04-01 | End: 2022-04-01 | Stop reason: SDUPTHER

## 2022-04-01 RX ORDER — SODIUM CHLORIDE 9 MG/ML
INJECTION, SOLUTION INTRAVENOUS PRN
Status: DISCONTINUED | OUTPATIENT
Start: 2022-04-01 | End: 2022-04-01 | Stop reason: HOSPADM

## 2022-04-01 RX ORDER — LABETALOL 20 MG/4 ML (5 MG/ML) INTRAVENOUS SYRINGE
10
Status: DISCONTINUED | OUTPATIENT
Start: 2022-04-01 | End: 2022-04-01 | Stop reason: HOSPADM

## 2022-04-01 RX ORDER — SUCCINYLCHOLINE CHLORIDE 20 MG/ML
INJECTION INTRAMUSCULAR; INTRAVENOUS PRN
Status: DISCONTINUED | OUTPATIENT
Start: 2022-04-01 | End: 2022-04-01 | Stop reason: SDUPTHER

## 2022-04-01 RX ORDER — PROPOFOL 10 MG/ML
INJECTION, EMULSION INTRAVENOUS PRN
Status: DISCONTINUED | OUTPATIENT
Start: 2022-04-01 | End: 2022-04-01 | Stop reason: SDUPTHER

## 2022-04-01 RX ORDER — EPHEDRINE SULFATE/0.9% NACL/PF 50 MG/5 ML
SYRINGE (ML) INTRAVENOUS PRN
Status: DISCONTINUED | OUTPATIENT
Start: 2022-04-01 | End: 2022-04-01 | Stop reason: SDUPTHER

## 2022-04-01 RX ORDER — INSULIN GLARGINE 100 [IU]/ML
10 INJECTION, SOLUTION SUBCUTANEOUS ONCE
Status: COMPLETED | OUTPATIENT
Start: 2022-04-01 | End: 2022-04-01

## 2022-04-01 RX ORDER — ROCURONIUM BROMIDE 10 MG/ML
INJECTION, SOLUTION INTRAVENOUS PRN
Status: DISCONTINUED | OUTPATIENT
Start: 2022-04-01 | End: 2022-04-01 | Stop reason: SDUPTHER

## 2022-04-01 RX ORDER — INSULIN GLARGINE 100 [IU]/ML
30 INJECTION, SOLUTION SUBCUTANEOUS NIGHTLY
Status: DISCONTINUED | OUTPATIENT
Start: 2022-04-01 | End: 2022-04-02

## 2022-04-01 RX ORDER — ONDANSETRON 2 MG/ML
4 INJECTION INTRAMUSCULAR; INTRAVENOUS
Status: DISCONTINUED | OUTPATIENT
Start: 2022-04-01 | End: 2022-04-01 | Stop reason: HOSPADM

## 2022-04-01 RX ORDER — LIDOCAINE HYDROCHLORIDE AND EPINEPHRINE 10; 10 MG/ML; UG/ML
INJECTION, SOLUTION INFILTRATION; PERINEURAL PRN
Status: DISCONTINUED | OUTPATIENT
Start: 2022-04-01 | End: 2022-04-01 | Stop reason: ALTCHOICE

## 2022-04-01 RX ORDER — HYDRALAZINE HYDROCHLORIDE 20 MG/ML
10 INJECTION INTRAMUSCULAR; INTRAVENOUS
Status: DISCONTINUED | OUTPATIENT
Start: 2022-04-01 | End: 2022-04-01 | Stop reason: HOSPADM

## 2022-04-01 RX ORDER — SODIUM CHLORIDE 0.9 % (FLUSH) 0.9 %
5-40 SYRINGE (ML) INJECTION EVERY 12 HOURS SCHEDULED
Status: DISCONTINUED | OUTPATIENT
Start: 2022-04-01 | End: 2022-04-01 | Stop reason: HOSPADM

## 2022-04-01 RX ORDER — FENTANYL CITRATE 50 UG/ML
INJECTION, SOLUTION INTRAMUSCULAR; INTRAVENOUS PRN
Status: DISCONTINUED | OUTPATIENT
Start: 2022-04-01 | End: 2022-04-01 | Stop reason: SDUPTHER

## 2022-04-01 RX ORDER — ONDANSETRON 2 MG/ML
INJECTION INTRAMUSCULAR; INTRAVENOUS PRN
Status: DISCONTINUED | OUTPATIENT
Start: 2022-04-01 | End: 2022-04-01 | Stop reason: SDUPTHER

## 2022-04-01 RX ORDER — SODIUM CHLORIDE 0.9 % (FLUSH) 0.9 %
5-40 SYRINGE (ML) INJECTION PRN
Status: DISCONTINUED | OUTPATIENT
Start: 2022-04-01 | End: 2022-04-01 | Stop reason: HOSPADM

## 2022-04-01 RX ORDER — LIDOCAINE HYDROCHLORIDE 20 MG/ML
INJECTION, SOLUTION EPIDURAL; INFILTRATION; INTRACAUDAL; PERINEURAL PRN
Status: DISCONTINUED | OUTPATIENT
Start: 2022-04-01 | End: 2022-04-01 | Stop reason: SDUPTHER

## 2022-04-01 RX ORDER — IPRATROPIUM BROMIDE AND ALBUTEROL SULFATE 2.5; .5 MG/3ML; MG/3ML
1 SOLUTION RESPIRATORY (INHALATION) EVERY 4 HOURS PRN
Status: DISCONTINUED | OUTPATIENT
Start: 2022-04-01 | End: 2022-04-08 | Stop reason: HOSPADM

## 2022-04-01 RX ADMIN — ACETAMINOPHEN 650 MG: 325 TABLET ORAL at 20:26

## 2022-04-01 RX ADMIN — METOPROLOL SUCCINATE 25 MG: 25 TABLET, FILM COATED, EXTENDED RELEASE ORAL at 08:35

## 2022-04-01 RX ADMIN — DEXAMETHASONE SODIUM PHOSPHATE 6 MG: 4 INJECTION, SOLUTION INTRA-ARTICULAR; INTRALESIONAL; INTRAMUSCULAR; INTRAVENOUS; SOFT TISSUE at 17:27

## 2022-04-01 RX ADMIN — MIDAZOLAM 2 MG: 1 INJECTION INTRAMUSCULAR; INTRAVENOUS at 14:34

## 2022-04-01 RX ADMIN — GUAIFENESIN 600 MG: 600 TABLET, EXTENDED RELEASE ORAL at 08:36

## 2022-04-01 RX ADMIN — IPRATROPIUM BROMIDE AND ALBUTEROL SULFATE 1 AMPULE: .5; 3 SOLUTION RESPIRATORY (INHALATION) at 08:11

## 2022-04-01 RX ADMIN — SODIUM CHLORIDE, PRESERVATIVE FREE 10 ML: 5 INJECTION INTRAVENOUS at 20:28

## 2022-04-01 RX ADMIN — INSULIN LISPRO 3 UNITS: 100 INJECTION, SOLUTION INTRAVENOUS; SUBCUTANEOUS at 11:26

## 2022-04-01 RX ADMIN — INSULIN LISPRO 5 UNITS: 100 INJECTION, SOLUTION INTRAVENOUS; SUBCUTANEOUS at 11:27

## 2022-04-01 RX ADMIN — FAMOTIDINE 20 MG: 20 TABLET ORAL at 08:43

## 2022-04-01 RX ADMIN — BUDESONIDE 500 MCG: 0.5 INHALANT RESPIRATORY (INHALATION) at 20:42

## 2022-04-01 RX ADMIN — FENTANYL CITRATE 50 MCG: 50 INJECTION, SOLUTION INTRAMUSCULAR; INTRAVENOUS at 14:44

## 2022-04-01 RX ADMIN — SODIUM CHLORIDE: 9 INJECTION, SOLUTION INTRAVENOUS at 14:35

## 2022-04-01 RX ADMIN — INSULIN GLARGINE 10 UNITS: 100 INJECTION, SOLUTION SUBCUTANEOUS at 08:37

## 2022-04-01 RX ADMIN — DEXAMETHASONE SODIUM PHOSPHATE 10 MG: 10 INJECTION, EMULSION INTRAMUSCULAR; INTRAVENOUS at 14:45

## 2022-04-01 RX ADMIN — SUCCINYLCHOLINE CHLORIDE 120 MG: 20 INJECTION, SOLUTION INTRAMUSCULAR; INTRAVENOUS at 14:44

## 2022-04-01 RX ADMIN — INSULIN LISPRO 2 UNITS: 100 INJECTION, SOLUTION INTRAVENOUS; SUBCUTANEOUS at 20:27

## 2022-04-01 RX ADMIN — INSULIN LISPRO 5 UNITS: 100 INJECTION, SOLUTION INTRAVENOUS; SUBCUTANEOUS at 08:43

## 2022-04-01 RX ADMIN — CALCIUM POLYCARBOPHIL 625 MG: 625 TABLET, FILM COATED ORAL at 08:36

## 2022-04-01 RX ADMIN — FAMOTIDINE 20 MG: 20 TABLET ORAL at 20:27

## 2022-04-01 RX ADMIN — INSULIN GLARGINE 30 UNITS: 100 INJECTION, SOLUTION SUBCUTANEOUS at 20:27

## 2022-04-01 RX ADMIN — SODIUM CHLORIDE: 9 INJECTION, SOLUTION INTRAVENOUS at 14:24

## 2022-04-01 RX ADMIN — ROSUVASTATIN CALCIUM 10 MG: 10 TABLET, FILM COATED ORAL at 20:27

## 2022-04-01 RX ADMIN — Medication 5 MG: at 14:51

## 2022-04-01 RX ADMIN — BUSPIRONE HYDROCHLORIDE 10 MG: 10 TABLET ORAL at 20:27

## 2022-04-01 RX ADMIN — PROPOFOL 160 MG: 10 INJECTION, EMULSION INTRAVENOUS at 14:43

## 2022-04-01 RX ADMIN — DEXAMETHASONE SODIUM PHOSPHATE 6 MG: 4 INJECTION, SOLUTION INTRA-ARTICULAR; INTRALESIONAL; INTRAMUSCULAR; INTRAVENOUS; SOFT TISSUE at 02:55

## 2022-04-01 RX ADMIN — Medication 10 MG: at 14:47

## 2022-04-01 RX ADMIN — ROCURONIUM BROMIDE 10 MG: 10 INJECTION INTRAVENOUS at 14:50

## 2022-04-01 RX ADMIN — INSULIN LISPRO 9 UNITS: 100 INJECTION, SOLUTION INTRAVENOUS; SUBCUTANEOUS at 08:36

## 2022-04-01 RX ADMIN — GUAIFENESIN 600 MG: 600 TABLET, EXTENDED RELEASE ORAL at 20:27

## 2022-04-01 RX ADMIN — BUSPIRONE HYDROCHLORIDE 10 MG: 10 TABLET ORAL at 08:36

## 2022-04-01 RX ADMIN — Medication 5 MG: at 14:49

## 2022-04-01 RX ADMIN — BUDESONIDE 500 MCG: 0.5 INHALANT RESPIRATORY (INHALATION) at 08:16

## 2022-04-01 RX ADMIN — SENNOSIDES 8.6 MG: 8.6 TABLET, COATED ORAL at 20:27

## 2022-04-01 RX ADMIN — LIDOCAINE HYDROCHLORIDE 50 MG: 20 INJECTION, SOLUTION EPIDURAL; INFILTRATION; INTRACAUDAL; PERINEURAL at 14:43

## 2022-04-01 RX ADMIN — Medication 6 MG: at 20:26

## 2022-04-01 RX ADMIN — HYDROXYZINE PAMOATE 25 MG: 25 CAPSULE ORAL at 02:55

## 2022-04-01 RX ADMIN — FENTANYL CITRATE 50 MCG: 50 INJECTION, SOLUTION INTRAMUSCULAR; INTRAVENOUS at 14:52

## 2022-04-01 RX ADMIN — ONDANSETRON HYDROCHLORIDE 4 MG: 4 INJECTION, SOLUTION INTRAMUSCULAR; INTRAVENOUS at 14:59

## 2022-04-01 ASSESSMENT — PULMONARY FUNCTION TESTS
PIF_VALUE: 21
PIF_VALUE: 30
PIF_VALUE: 22
PIF_VALUE: 21
PIF_VALUE: 0
PIF_VALUE: 1
PIF_VALUE: 19
PIF_VALUE: 9
PIF_VALUE: 21
PIF_VALUE: 22
PIF_VALUE: 21
PIF_VALUE: 11
PIF_VALUE: 22
PIF_VALUE: 19
PIF_VALUE: 11
PIF_VALUE: 20
PIF_VALUE: 21
PIF_VALUE: 19
PIF_VALUE: 20
PIF_VALUE: 21
PIF_VALUE: 11
PIF_VALUE: 8
PIF_VALUE: 19
PIF_VALUE: 2
PIF_VALUE: 21
PIF_VALUE: 22
PIF_VALUE: 22
PIF_VALUE: 20
PIF_VALUE: 21
PIF_VALUE: 22
PIF_VALUE: 22
PIF_VALUE: 20
PIF_VALUE: 22
PIF_VALUE: 19
PIF_VALUE: 13
PIF_VALUE: 23
PIF_VALUE: 21
PIF_VALUE: 19
PIF_VALUE: 19
PIF_VALUE: 2
PIF_VALUE: 22
PIF_VALUE: 21
PIF_VALUE: 0
PIF_VALUE: 22
PIF_VALUE: 19
PIF_VALUE: 13
PIF_VALUE: 11
PIF_VALUE: 19
PIF_VALUE: 21
PIF_VALUE: 0
PIF_VALUE: 1
PIF_VALUE: 20

## 2022-04-01 ASSESSMENT — PAIN - FUNCTIONAL ASSESSMENT: PAIN_FUNCTIONAL_ASSESSMENT: PREVENTS OR INTERFERES SOME ACTIVE ACTIVITIES AND ADLS

## 2022-04-01 ASSESSMENT — PAIN SCALES - GENERAL
PAINLEVEL_OUTOF10: 0
PAINLEVEL_OUTOF10: 2
PAINLEVEL_OUTOF10: 0
PAINLEVEL_OUTOF10: 3
PAINLEVEL_OUTOF10: 0

## 2022-04-01 ASSESSMENT — PAIN DESCRIPTION - PROGRESSION: CLINICAL_PROGRESSION: NOT CHANGED

## 2022-04-01 ASSESSMENT — PAIN DESCRIPTION - DESCRIPTORS: DESCRIPTORS: ACHING

## 2022-04-01 ASSESSMENT — PAIN DESCRIPTION - LOCATION: LOCATION: NECK

## 2022-04-01 ASSESSMENT — PAIN DESCRIPTION - PAIN TYPE: TYPE: SURGICAL PAIN

## 2022-04-01 ASSESSMENT — PAIN DESCRIPTION - FREQUENCY: FREQUENCY: CONTINUOUS

## 2022-04-01 ASSESSMENT — PAIN DESCRIPTION - ONSET: ONSET: ON-GOING

## 2022-04-01 NOTE — CARE COORDINATION
Collaborative Discharge Planning    Kim Cook  :  1950  MRN:  [id number]    ADMIT DATE:  3/27/2022      Discharge Planning Discharge Planning  Living Arrangements: Spouse/Significant Other  Support Systems: Family Members  DME: [facility]  Type of Home Care Services: Nursing Services  Patient expects to be discharged to[de-identified] [facility] Board Notes /Social Work Whiteboard Notes  /Social Work Whiteboard: ; SW/CM: [facility]. Has home O2 through [address] trach on . Procedure   3/28 Suspension Microlaryngoscopy w Balloon Dilation and Jet Ventilaton, Kenalog Injection, Flexibile Nasolaryngosocpy     Tracheostomy planned    Discharge Plan Home with home health   Plans home  w spouse Muriel Bro, has [employer] home oxyge 2L and will need new trach/suction/mist supplies; prefers [facility] therapy following    Discharge Milestones and Delays: Clinical status  Tracheostomy planned today.  HF 40%/30L oxygen, IVF continued      SIGNED:  Lashawn Alonso RN   2022, 9:20 AM

## 2022-04-01 NOTE — PLAN OF CARE
Problem: Falls - Risk of:  Goal: Will remain free from falls  Description: Will remain free from falls  Outcome: Ongoing  Goal: Absence of physical injury  Description: Absence of physical injury  Outcome: Ongoing     Problem: Daily Care:  Goal: Daily care needs are met  Description: Daily care needs are met  Outcome: Ongoing     Problem: Pain:  Goal: Pain level will decrease  Description: Pain level will decrease  Outcome: Ongoing  Goal: Control of acute pain  Description: Control of acute pain  Outcome: Ongoing  Goal: Control of chronic pain  Description: Control of chronic pain  Outcome: Ongoing     Problem: Skin Integrity:  Goal: Skin integrity will stabilize  Description: Skin integrity will stabilize  Outcome: Ongoing     Problem: Discharge Planning:  Goal: Patients continuum of care needs are met  Description: Patients continuum of care needs are met  Outcome: Ongoing     Problem: OXYGENATION/RESPIRATORY FUNCTION  Goal: Patient will maintain patent airway  Outcome: Ongoing  Goal: Patient will achieve/maintain normal respiratory rate/effort  Description: Respiratory rate and effort will be within normal limits for the patient  Outcome: Ongoing     Problem: Skin Integrity:  Goal: Will show no infection signs and symptoms  Description: Will show no infection signs and symptoms  Outcome: Ongoing  Goal: Absence of new skin breakdown  Description: Absence of new skin breakdown  Outcome: Ongoing

## 2022-04-01 NOTE — ANESTHESIA POSTPROCEDURE EVALUATION
Department of Anesthesiology  Postprocedure Note    Patient: Christine Collins  MRN: [id number]  YOB: 1950  Date of evaluation: 4/1/2022  Time:  4:01 PM     Procedure Summary     Date: 04/01/22 Room / Location: [address]    Anesthesia Start: 1427 Anesthesia Stop: 0699    Procedure: TRACHEOTOMY TUBE REPLACEMENT (N/A ) Diagnosis: (Glattic stenosis)    Surgeons: Rojelio Jimenez MD Responsible Provider: Petey Hargrove MD    Anesthesia Type: general ASA Status: 3          Anesthesia Type: general    Patricia Phase I: Patricia Score: 8    Patricia Phase II:      Last vitals: Reviewed and per EMR flowsheets.        Anesthesia Post Evaluation    Patient location during evaluation: PACU  Patient participation: complete - patient participated  Level of consciousness: awake and alert  Airway patency: patent  Nausea & Vomiting: no nausea  Complications: no  Cardiovascular status: blood pressure returned to baseline and hemodynamically stable  Respiratory status: acceptable and spontaneous ventilation  Hydration status: euvolemic

## 2022-04-01 NOTE — PROGRESS NOTES
[address] [address]  INPATIENT PHYSICAL THERAPY  DAILY NOTE  [facility] ICU STEPDOWN TELEMETRY 4K - 4K-01001-A     Time In:   Time Out: 926  Timed Code Treatment Minutes: 33 Minutes  Minutes: 33          Date: 2022  Patient Name: Alonso Sellers,  Gender:  female        MRN: [id number]  : 1950  (73 y.o.)     Referring Practitioner: Xiao Montes DO  Diagnosis: SOB  Additional Pertinent Hx: The patient is a 73 y.o. female who was admitted to [facility] late last evening. She is known to our service - s/p suspension microlaryngosocpy with balloon dilation and laryngeal steroid injection secondary to glottic stenosis 3/22/2022 with Dr Butch Luna and Dr Ruby Wheeler. Patient was discharged from hospital on 3/23/2022 with significant improvement in her breathing. Patient reports that she continues on supplemental oxygen at home. She reports progressive shortness of breath and stridor over the weekend. She states that she truly felt she could not breathe and was not sure if she would live while traveling to the ER last evening. Patient has history of hospitalization here at [address] [place] [address] from 2021-2022 for severe COVID-19 infection with respiratory failure. She failed HFNC and BiPAP progressing to need for endotracheal intubation on 2021 secondary to severe ARDS. She was extubated on 2022. Patient reports hoarse voice following extubation. She was transferred to inpatient rehab from following acute hospital stay. She was discharged on supplemental oxygen. She had ER visit on 3/9/2022 for shortness of breath, then presented again on 3/18/2022 for shortness of breath with admission. Pt is s/p SUSPENSION MICROLARYNGOSCOPY WITH BALLOON DILATION AND JET VENT, KENALOG INJECTION, flexible nasolaryngoscopy 3/28.      Prior Level of Function:  Lives With: Spouse  Type of Home: House  Home Layout: One level,Able to Live on Main level with bedroom/bathroom  Home Access: Stairs to enter without rails  Entrance Stairs - Number of Steps: 2-4inch steps, uses door frame  Home Equipment: Wheelchair-manual (NO AD at home, wheelchair for further distances in community)   Bathroom Shower/Tub: Walk-in shower,Shower chair without back (bench)  Bathroom Toilet: Standard  Bathroom Equipment: Grab bars in shower  Bathroom Accessibility: Accessible    Receives Help From: Family  ADL Assistance: Independent  Homemaking Assistance: Needs assistance  Ambulation Assistance: Independent  Transfer Assistance: Independent  Active : Yes (Patient reports she has driven a few times ~ 5 miles)  Additional Comments: Pt amb without AD    Restrictions/Precautions:  Restrictions/Precautions: Fall Risk      SUBJECTIVE: RN approved session. Pt to be going for tracheostomy this afternoon. Pt agrees to therapy. PAIN: denies      Vitals: Vitals not assessed per clinical judgement, see nursing flowsheet    OBJECTIVE:  Bed Mobility:  Not Tested    Transfers:  Sit to Stand: Supervision  Stand to Sit:Supervision    Ambulation:  Supervision, Stand By Assistance  Distance: 8 ft due to limited range of HFNC tubing  Surface: Level Tile  Device:No Device  Gait Deviations:  Decreased Step Length Bilaterally, Decreased Gait Speed and Decreased Heel Strike Bilaterally    Balance:  generally steady    Exercise:  Patient was guided in 1 set(s) 10-12 reps of exercise to both lower extremities. Ankle pumps, Glut sets, Quad sets, Hip abduction/adduction, Seated marches and Long arc quads. Exercises were completed for increased independence with functional mobility. Functional Outcome Measures: Not completed       ASSESSMENT:  Assessment: Patient progressing toward established goals. Activity Tolerance:  Patient tolerance of  treatment: good.        Equipment Recommendations:Equipment Needed: No  Discharge Recommendations: Continue to assess pending progress  Plan: Times per week: 2-3x GM  Current Treatment Recommendations: Strengthening,Functional Mobility Training,Transfer Training,Balance Training,Endurance Training,Gait Training,Stair training,Neuromuscular Re-education,Patient/Caregiver Education & Training    Patient Education  Patient Education: Plan of Care, Home Exercise Program    Goals:  Patient goals : to go home  Short term goals  Time Frame for Short term goals: by discharge  Short term goal 1: Pt to transfer sit <--> stand I for increased functional mobility. Short term goal 2: Pt to ambulate >200 feet without AD with Supervision for household ambulation. Short term goal 3: Pt to ascend/descend 2 steps with HR SBA for home entry. Long term goals  Time Frame for Long term goals : NA due to short length of stay. Following session, patient left in safe position with all fall risk precautions in place. Robert Christy.  Hilary Garcia Calvin 8

## 2022-04-01 NOTE — OP NOTE
Otolaryngology-Head and Neck Surgery Operative Note  Surgeon: Elissa Powell M.D. Patient: Dorothe Ahumada  YOB: 1950  MRN: [id number]    Date of Procedure: 4/1/2022    Pre-Op Diagnosis: Glattic stenosis    Post-Op Diagnosis: Same       Procedure(s):  TRACHEOSTOMY     Surgeon(s):  Elissa Powell MD    Assistant:   * No surgical staff found *    Anesthesia: General General endotracheal anesthesia    Estimated Blood Loss (mL): Minimal    Complications: None immediate    Specimens:   * No specimens in log *    Implants:  * No implants in log *      Drains: * No LDAs found *     INDICATIONS FOR PROCEDURE: Dorothe Ahumada is a 73 y.o. female with Glattic stenosis. The risks, benefits, complications, treatment options and expected outcomes were discussed with her in detail both in clinic and again today. The possibilities of complication related to the procedure and anethesia were reiterated, and the consent form was updated appropriately. FINDINGS: A 6 Shiley cuffed trach was placed between the second and third tracheal ring, and secured to the skin with 4 sutures. OPERATIVE DESCRIPTION   After informed consent was obtained from the patient, the patient was brought from [facility]  to the operating room suite where they remained intubated and was started on general anesthetic after being placed in the supine position on the operating room table. After induction of general anesthesia, a time out was performed to ensure the correct patient, correct procedure. After all in the room were in agreement, a shoulder roll was placed under the patient extending the neck. A marking pen was used to identify the sternal notch and approximately 2 fingerbreadths above the sternal notch, a horizontal line was made with a marking pen to identify the area for initial incision into the neck. After this, approximately 5 mL of 1% lidocaine with 1:100,000 epinephrine was injected at the previously marked site.  At this time, the patient was prepped and draped in the usual sterile fashion for tracheotomy. A 15 blade was used to make the original horizontal skin incision approximately 3 cm in length and 2 fingerbreadths above the sternal notch. Dissection was carried down through the subcutaneous tissue and overlying fascia using Bovie electrocautery down to the level of the strap muscles. At this time, the dissection was continued bluntly using a Wills scissors and tonsil clamps vertically along the plane of the median raphe. The dissection continued in a vertical direction along the plane of the median raphe. Once this was achieved past the sternohyoid and sternothyroid muscles, the thyroid isthmus was brought into view and found to be overlying the 2nd and 3rd tracheal rings. The tonsil was again used to elevate the thyroid isthmus bluntly, and the isthmus was transected in a vertical fashion using Bovie electrocautery. At this time, the trachea itself was visualized and skeletonized. A 15 blade was then used to make a transverse incision superior to the second tracheal ring into the airway. After this, with use of a cricoid hook to elevate the airway out of the surgical field as well as tracheal dilator to increase the size of the tracheotomy, a 6 cuffed Shiley tracheostomy tube was inserted into the tracheotomy site without complications with the cuff inflated. The anesthesia circuit was then brought into the field with confirmation of adequate end-tidal CO2. At this time, the trach was secured in place using silk sutures in all 4 quadrants of the tracheostomy flanges. After this, a trach tie was used to secure the trach to the neck. At this time, the operation was subsequently terminated, and the patient was returned to the anesthesia service with transfer of the patient back to the intensive care unit in stable condition on mechanical ventilation.  I was present for and performed the patient's entire operation until she was taken to recovery.         Electronically signed by Yon Quevedo MD on 4/1/2022 at 3:36 PM

## 2022-04-01 NOTE — PROGRESS NOTES
1535 Pt arrives to recovery responsive to verbal stimulation. Pt respirations are even and unlabored on 6 L. Pt denies any pain   1545 Pt resting quietly in bed with eyes closed. Pt denies any pain. VSS, respirations are unlabored on 4 L nasal canula   1555 Pt denies any pain at this time. Pt remains to rest with eyes closed. Respirations are unlabored on 2 L nasal canula. VSS  1605 Pt meets criteria for discharge from recovery. Report called to YVETTE abel at this time. 1614 pt trach suctioned at this time. Pt respirations are unlabored. VSS.  Pt denies any pain   1636 pt departing recovery in stable condition with transport

## 2022-04-01 NOTE — CARE COORDINATION
DISCHARGE/PLANNING EVALUATION  4/1/22, 8:59 AM EDT    Reason for Referral: Discharge planning  Mental Status: Alert and Oriented  Decision Making: Self  Family/Social/Home Environment: Patient lives with spouse in their home. She reported that she has supportive family and friends in the area. Current Services including food security, transportation and housekeeping: Patient reported that she was independent at home prior to admission. She reported that she did not have any current HH. Current Equipment: O2  Payment Source: Medicare  Concerns or Barriers to Discharge: None  Post acute provider list with quality measures, geographic area and applicable managed care information provided. Questions regarding selection process answered: Offered    Teach Back Method used with patient regarding care plan and needs. Patient verbalize understanding of the plan of care and contribute to goal setting. Patient goals, treatment preferences and discharge plan: Patient plans to return home with [facility]. SW made referral to [facility].     Electronically signed by KIARRA Abarca on 4/1/2022 at 8:59 AM

## 2022-04-01 NOTE — RT PROTOCOL NOTE
RT Inhaler-Nebulizer Bronchodilator Protocol Note    There is a bronchodilator order in the chart from a provider indicating to follow the RT Bronchodilator Protocol and there is an Initiate RT Inhaler-Nebulizer Bronchodilator Protocol order as well (see protocol at bottom of note). CXR Findings:  No results found. The findings from the last RT Protocol Assessment were as follows:   History Pulmonary Disease: None or smoker <15 pack years  Respiratory Pattern: Regular pattern and RR 12-20 bpm  Breath Sounds: Slightly diminished and/or crackles  Cough: Strong, productive  Indication for Bronchodilator Therapy: Decreased or absent breath sounds  Bronchodilator Assessment Score: 3    Aerosolized bronchodilator medication orders have been revised according to the RT Inhaler-Nebulizer Bronchodilator Protocol below. Respiratory Therapist to perform RT Therapy Protocol Assessment initially then follow the protocol. Repeat RT Therapy Protocol Assessment PRN for score 0-3 or on second treatment, BID, and PRN for scores above 3. No Indications - adjust the frequency to every 6 hours PRN wheezing or bronchospasm, if no treatments needed after 48 hours then discontinue using Per Protocol order mode. If indication present, adjust the RT bronchodilator orders based on the Bronchodilator Assessment Score as indicated below. Use Inhaler orders unless patient has one or more of the following: on home nebulizer, not able to hold breath for 10 seconds, is not alert and oriented, cannot activate and use MDI correctly, or respiratory rate 25 breaths per minute or more, then use the equivalent nebulizer order(s) with same Frequency and PRN reasons based on the score. If a patient is on this medication at home then do not decrease Frequency below that used at home.     0-3 - enter or revise RT bronchodilator order(s) to equivalent RT Bronchodilator order with Frequency of every 4 hours PRN for wheezing or increased work of breathing using Per Protocol order mode. 4-6 - enter or revise RT Bronchodilator order(s) to two equivalent RT bronchodilator orders with one order with BID Frequency and one order with Frequency of every 4 hours PRN wheezing or increased work of breathing using Per Protocol order mode. 7-10 - enter or revise RT Bronchodilator order(s) to two equivalent RT bronchodilator orders with one order with TID Frequency and one order with Frequency of every 4 hours PRN wheezing or increased work of breathing using Per Protocol order mode. 11-13 - enter or revise RT Bronchodilator order(s) to one equivalent RT bronchodilator order with QID Frequency and an Albuterol order with Frequency of every 4 hours PRN wheezing or increased work of breathing using Per Protocol order mode. Greater than 13 - enter or revise RT Bronchodilator order(s) to one equivalent RT bronchodilator order with every 4 hours Frequency and an Albuterol order with Frequency of every 2 hours PRN wheezing or increased work of breathing using Per Protocol order mode. RT to enter RT Home Evaluation for COPD & MDI Assessment order using Per Protocol order mode.     Electronically signed by Vicente Lopez RCP on 4/1/2022 at 8:15 AM

## 2022-04-01 NOTE — PLAN OF CARE
Problem: Falls - Risk of:  Goal: Will remain free from falls  Description: Will remain free from falls  Outcome: Ongoing  Note: Pt remains free from falls. Standard fall precautions in place. Pt is independent upon set up and ambulates steadily without weakness. Will continue to monitor. Problem: Daily Care:  Goal: Daily care needs are met  Description: Daily care needs are met  Outcome: Ongoing  Note: Pt verbalizes he daily care needs are met. Pt denies further questions or complaints at this time. Will continue to monitor. Problem: Pain:  Goal: Pain level will decrease  Description: Pain level will decrease  Outcome: Ongoing  Note: Pt denies pain at this time. She states she does have chronic foot pain from her diabetic neuropathy, but does not have any new acute pain. Will continue to monitor. Problem: Skin Integrity:  Goal: Skin integrity will stabilize  Description: Skin integrity will stabilize  Outcome: Ongoing  Note: Skin integrity intact. Pt turns self frequently and repositions herself independently. Q2H turns with pillow support offered. Pt denies further questions or complaints at this time. Will continue to monitor. Problem: Discharge Planning:  Goal: Patients continuum of care needs are met  Description: Patients continuum of care needs are met  Outcome: Ongoing  Note: Pt plans home at discharge. Home health may be needed. Social work and case management on the case. Will continue to monitor. Problem: OXYGENATION/RESPIRATORY FUNCTION  Goal: Patient will maintain patent airway  4/1/2022 1100 by Timmy Lopez RN  Outcome: Ongoing  Note: Pt is maintaining patent airway. She is on HFNC for humidification and comfort. Pt is on the schedule for a tracheostomy at 1300. Breathing treatments administered per MAR. Will continue to monitor.       Problem: Skin Integrity:  Goal: Absence of new skin breakdown  Description: Absence of new skin breakdown  Outcome: Ongoing  Note: Skin integrity intact. Pt turns self frequently and repositions herself independently. Q2H turns with pillow support offered. Pt denies further questions or complaints at this time. Will continue to monitor. Care plan reviewed with patient. Patient verbalizes understanding of the plan of care and contribute to goal setting.

## 2022-04-01 NOTE — PLAN OF CARE
Problem: OXYGENATION/RESPIRATORY FUNCTION  Goal: Patient will achieve/maintain normal respiratory rate/effort  Description: Respiratory rate and effort will be within normal limits for the patient  4/1/2022 0128 by Silver Jean RCP  Outcome: Ongoing  Continuing treatments per protocol. Problem: OXYGENATION/RESPIRATORY FUNCTION  Goal: Patient will maintain patent airway  4/1/2022 0128 by Silver Jean RCP  Outcome: Ongoing   Continuing high flow nasal cannula as tolerated.

## 2022-04-01 NOTE — PROGRESS NOTES
Medicine Progress Note    Patient:  Tato Betancourt    Unit/Bed:4K-01/001-A  YOB: 1950  MRN: [id number]   Acct: [de-identified]   PCP: Nieves Perkins  Date of Admission: 3/27/2022    Hospital Course     Briefly, pt Tato Betancourt is a 73 y.o. female with a PMH of essential HTN, uncontrolled NIDDMT2 c/b diabetic neuropathy A1c 9.9% 12/21/21 on glipizide only, H/o COVID-19 infection requiring intubation c/b tracheal stenosis s/p suspension microlayngoscopy w/ relief of glottic stenosis / airway balloon dilation / laryngeal steroid injection 03/22/22, CAD s/p PCI w/ BMS to LAD in 2008 on ASA 81 mg daily, who presented with worsening shortness of breath 03/27/22 and was found to have significant recurrence glottic stenosis. Pt was also found to be hyperglycemic 2/2 initiation of steroids requiring insulin. Pt is scheduled for tracheostomy this afternoon. 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 Requiring [address]. Inspiratory and expiratory stidor on auscultation c/w fixed obstruction from glottic stenosis. S/p suspension microlayngoscopy w/ relief of glottic stenosis / airway balloon dilation / laryngeal steroid injection 03/22/22 by Dr. Rd Ramirez. Plan:   -ENT consulted, appreciate recs     +Continue HD steroids     +Continue HHFNC     +Continuous pulse oximetry     +Tracheostomy today; maintenance IV fluids  -Aspiration precautions  -Acapella as tolerated  -IS as tolerated  -Wean as tolerated    #Uncontrolled NIDDMT2 c/b bilateral neuropathy A1c 9.9 in 12/21/21: Active  Takes glipizide at home only  Diabetic Nephropathy: no screening in chart  Diabetic Retinopathy: no screening in chart  Diabetic Neuropathy: Bilateral LE sensation diminished; burning sensation present, not currently on pharmacotherapy  Plan:  -Hold ALL OGAs  -Continue High Dose SSI Algorithm  -Continue insulin glargine;  Increase to 30 units nightly  -Hypoglycemia protocol  -POCT glucose qAC/HS    #Steroid-induced Leukocytosis: Stable  Edith concurrently w/ initiation of steroids. No s/s or evidence of infection at this time. Plan: Continue to monitor with CBC    #Non-obstructive CAD s/p PCI w/ BMS of LAD in 2008: Stable  Takes ASA 81 mg daily  Plan: Hold ASA 81 mg for tracheostomy; resume once hemostasis achieved    #Chronic Systolic + Diastolic Heart Failure with EF 60% on 03/18/22, NYHA Class III 2/2 ICM: Stable  GDMT at home: none, started on metoprolol succinate 25 mg daily. Diuretics at home: furosemide. Follows in HF Clinic: No.   Plan:   -Strict I&Os  -Daily weights  -Refer to HF outpatient clinic on discharge if patient is amenable  -Continue metoprolol succinate 25 mg daily    #Acute on Chronic Anxiety: Stable  Started on LD Buspar  Plan: Continue Buspar    Dispo: 4k    Fluids: IVNS @ 75 cc/hr  Electrolyte: Replete as needed  Nutrition: NPO  GI: Pepcid    Lines/Tubes: Right forearm PIV 03/31/2022    DVT ppx: SCDs  PT/OT/SLP: PT/OT/SLP for further evaluation and management    Code status: Full Code No additional code details    Subjective     Pt is conversing comfortably today but does have to catch her breath after each sentence. No acute overnight events. Still requiring HHFNC to maintain O2 saturation >90%. Initial HPI     \"Kaleigh Hickman is 73years old female who presented to the hospital for worsening shortness of breath. Past medical history significant with recent hospital admission for glottic stenosis (3/18-23/22), CAD SP PIC, hypertension, diabetes, anxiety. Patient was recently admitted and managed for glottic stenosis with she underwent suspension microlaryngoscopy with relief of glottic stenosis, airway balloon dilation, and laryngeal steroid injection on 3/22/22. Patient was discharged home on 3/24. Since 3/25, patient has increase in mucus production which patient feeling \"something stuck in her throat\".  She has been using Intensive spirometry at home which she recall that from yesterday she couldn't be able to get any air into the spirometry. Patient reported SOB has been worsening which she decided to to to ED. Denied any fever, chill, chest pain, yellow cough without sputum production, recent sick contact.     In the ED, patient O2 sat 93% on room air. Patient received Decadron 8 mg and racemic epi which her SOB has been improved and O2 sat 100% on room air.      Upon visit patient can talk in full conversation without respiratory distress. \" - Dr. Louis Landers, DO     Objective     Vitals:     /82   Pulse 62   Temp 97.5 °F (36.4 °C) (Oral)   Resp 18   Ht 5' 2\" (1.575 m)   Wt 188 lb 11.1 oz (85.6 kg)   LMP  (LMP Unknown)   SpO2 100%   BMI 34.51 kg/m²     FNC Settings:                     FiO2 : 40 %     Intake and Output:       Intake/Output Summary (Last 24 hours) at 4/1/2022 1253  Last data filed at 4/1/2022 0729  Gross per 24 hour   Intake 1069.3 ml   Output 400 ml   Net 669.3 ml       Outpatient Medications:     Scheduled Meds:   insulin glargine  30 Units SubCUTAneous Nightly    insulin lispro  0-18 Units SubCUTAneous TID WC    [Held by provider] insulin lispro  0-9 Units SubCUTAneous Nightly    insulin lispro  0-6 Units SubCUTAneous Nightly    dexamethasone  6 mg IntraVENous Q12H    insulin lispro  5 Units SubCUTAneous TID AC    busPIRone  10 mg Oral TID    budesonide  0.5 mg Nebulization BID    sodium chloride flush  5-40 mL IntraVENous 2 times per day    [Held by provider] aspirin  81 mg Oral Daily    famotidine  20 mg Oral BID    [Held by provider] furosemide  40 mg Oral Daily    melatonin  6 mg Oral Nightly    polycarbophil  625 mg Oral Daily    rosuvastatin  10 mg Oral Nightly    guaiFENesin  600 mg Oral BID    metoprolol succinate  25 mg Oral Daily     Continuous Infusions:   sodium chloride Stopped (04/01/22 1150)    sodium chloride      dextrose       PRN Meds:ipratropium-albuterol, sodium chloride flush, sodium chloride, ondansetron **OR** ondansetron, polyethylene glycol, acetaminophen **OR** acetaminophen, racepinephrine HCl, sodium chloride nebulizer, diclofenac sodium, hydrOXYzine, nitroGLYCERIN, senna, glucagon (rDNA), dextrose, glucose, dextrose bolus (hypoglycemia) **OR** dextrose bolus (hypoglycemia)    Physical Exam:     Physical Exam  Constitutional:       General: She is not in acute distress. Appearance: She is obese. She is not ill-appearing. HENT:      Head: Normocephalic and atraumatic. Nose: No congestion or rhinorrhea. Mouth/Throat:      Mouth: Mucous membranes are moist.      Pharynx: Oropharynx is clear. No oropharyngeal exudate or posterior oropharyngeal erythema. Eyes:      General: No scleral icterus. Extraocular Movements: Extraocular movements intact. Pupils: Pupils are equal, round, and reactive to light. Cardiovascular:      Rate and Rhythm: Normal rate and regular rhythm. Pulses: Normal pulses. Heart sounds: Normal heart sounds. No murmur heard. No friction rub. No gallop. Pulmonary:      Effort: Pulmonary effort is normal.      Breath sounds: Stridor present. Wheezing present. Abdominal:      General: Abdomen is flat. Palpations: Abdomen is soft. Tenderness: There is no abdominal tenderness. There is no guarding or rebound. Musculoskeletal:         General: Normal range of motion. Right lower leg: No edema. Left lower leg: No edema. Skin:     General: Skin is warm and dry. Capillary Refill: Capillary refill takes less than 2 seconds. Neurological:      General: No focal deficit present. Mental Status: She is alert and oriented to person, place, and time.    Psychiatric:         Mood and Affect: Mood normal.         Behavior: Behavior normal.         Labs:     Recent Labs     03/30/22  0451 03/31/22  0830   WBC 26.3* 17.9*   HGB 14.2 13.1   HCT 43.6 39.5    209   MPV 10.4 10.1     Recent Labs 03/30/22  0451 03/31/22  0830 04/01/22  0502    136 139   K 5.3* 5.2 4.6   CL 97* 98 101   CO2 29 28 28   BUN 31* 30* 32*   CREATININE 0.8 0.7 0.6   GLUCOSE 306* 265* 273*     No results for input(s): BILITOT, BILIDIR, PROT, ALBUMIN, AST in the last 72 hours. Invalid input(s): ALK, LABALT  No results for input(s): TROPONINI, CKMB in the last 72 hours. Invalid input(s): CKTOT  No results for input(s): CHOL, TRIG, HDL, LDL in the last 72 hours. No results for input(s): SEDRATE, HSCRP in the last 72 hours. No results for input(s): APTT, INR in the last 72 hours. Invalid input(s): PT  Invalid input(s): HGBA1C  No results for input(s): PH, DKM0CGT, PO2, BASE in the last 72 hours. Invalid input(s): BSV9UCF, SO2, INSPIREDO2  No results for input(s): ABO, RH in the last 72 hours. @LABALLVALUEIP(AMYLASE:5)@  No results for input(s): COLORU, PROTUR, UROBILINOGEN, OSMOLALITY, NAUR, KUR, CLUR, AMPHETAMINE, BENZOURQL, CANNABINOIDS, COCAINE, PCP in the last 72 hours. Invalid input(s): Tate Ohms, MJ, GLUCUA, Karilyn Hilding, BILIRUBIN, HGBUA, NITRITEU, LEUKOCTEST, WBCU, RBCU, MUCOUS, SPECGRAVUR, BARBSCRNUR, DOAUR  No results for input(s): TSH, T3FREE, FREET4 in the last 72 hours. Diagnostics:     Imaging:  No results found.     EKG:   No new    Micro:   Patient Infection Status     Infection Onset Added Last Indicated Last Indicated By Review Planned Expiration Resolved Resolved By    None active    Resolved    COVID-19 12/17/21 12/19/21 12/17/21 COVID-19   01/06/22 Davis Daily, RN    +12/17, admit 12/18 - 80%          Path:   N/A    Signed:  Baltazar Lynn MD  Internal Medicine, PGY-1  4/1/2022  12:53 PM    Staff: Dr. Jason Robertson MD

## 2022-04-01 NOTE — PROGRESS NOTES
Department of Otolaryngology  Progress Note    Chief Complaint:  Dyspnea, stridor    SUBJECTIVE:  No acute events overnight. She continues to need high flow oxygen. A complete multi-organ review of systems was performed using a new patient questionnaire, and reviewed by me. ENT:  negative except as noted in HPI  CONSTITUTIONAL:  negative except as noted in HPI  EYES:  negative except as noted in HPI  RESPIRATORY:  negative except as noted in HPI  CARDIOVASCULAR:  negative except as noted in HPI  GASTROINTESTINAL:  negative except as noted in HPI  GENITOURINARY:  negative except as noted in HPI  MUSCULOSKELETAL:  negative except as noted in HPI  SKIN:  negative except as noted in HPI  ENDOCRINE/METABOLIC: negative except as noted in HPI  HEMATOLOGIC/LYMPHATIC:  negative except as noted in HPI  ALLERGY/IMMUN: negative except as noted in HPI  NEUROLOGICAL:  negative except as noted in HPI  BEHAVIOR/PSYCH:  negative except as noted in HPI    OBJECTIVE      Physical  VITALS:  /82   Pulse 62   Temp 97.5 °F (36.4 °C) (Oral)   Resp 18   Ht 5' 2\" (1.575 m)   Wt 188 lb 11.1 oz (85.6 kg)   LMP  (LMP Unknown)   SpO2 100%   BMI 34.51 kg/m²     ABNORMAL OTOLARYNGOLOGIC EXAM FINDINGS: Mild airflow turbulence with inspiration and expiration while talking. No problem swallowing. Voice remains breathy, weak, and rough.     OTHER PERTINENT EXAM FINDINGS:  GENERAL: Awake, NAD, Non-toxic appearing. Patient is alert and oriented toperson, place and time. Not obviously hearing impaired. NEUROLOGICAL: GCS 15, Cranial nerves II-VI, VIII-XII grossly intact,  Facial nerve (VII) w/ House-Brackman Grade 1 of 6 bilaterally, No evidence of nystagmus or gross asymmetry    PSYCHIATRIC: Mood and Affect appropriate. HEAD: NC/AT  SKIN: No overtly ulcerated, cellulitic, or indurated lesions of the head or neck. NECK: Soft, supple. No crepitus or masses appreciated,  Trachea midline.  Good neck extension, and minimal soft tissue overlying the trachea  CHEST: Breathing is mildly labored, but without retraction. With increased respiratory effort, comes increased biphasic stridor.      Data  CBC with Differential:    Lab Results   Component Value Date    WBC 17.9 03/31/2022    RBC 4.51 03/31/2022    HGB 13.1 03/31/2022    HCT 39.5 03/31/2022     03/31/2022    MCV 87.6 03/31/2022    MCH 29.0 03/31/2022    MCHC 33.2 03/31/2022    NRBC 0 03/27/2022    SEGSPCT 65.9 03/27/2022    MONOPCT 7.6 03/27/2022    MONOSABS 1.1 03/27/2022    LYMPHSABS 3.2 03/27/2022    EOSABS 0.1 03/27/2022    BASOSABS 0.1 03/27/2022     BMP:    Lab Results   Component Value Date     04/01/2022    K 4.6 04/01/2022    K 5.0 03/23/2022     04/01/2022    CO2 28 04/01/2022    BUN 32 04/01/2022    LABALBU 4.3 03/18/2022    CREATININE 0.6 04/01/2022    CALCIUM 9.1 04/01/2022    LABGLOM >90 04/01/2022    GLUCOSE 273 04/01/2022       Inpatient Medications  Current Facility-Administered Medications: insulin glargine (LANTUS) injection vial 30 Units, 30 Units, SubCUTAneous, Nightly  ipratropium-albuterol (DUONEB) nebulizer solution 1 ampule, 1 ampule, Inhalation, Q4H PRN  sodium chloride flush 0.9 % injection 5-40 mL, 5-40 mL, IntraVENous, 2 times per day  sodium chloride flush 0.9 % injection 5-40 mL, 5-40 mL, IntraVENous, PRN  0.9 % sodium chloride infusion, , IntraVENous, PRN  HYDROmorphone (DILAUDID) injection 0.25 mg, 0.25 mg, IntraVENous, Q5 Min PRN  HYDROmorphone (DILAUDID) injection 0.5 mg, 0.5 mg, IntraVENous, Q5 Min PRN  ondansetron (ZOFRAN) injection 4 mg, 4 mg, IntraVENous, Once PRN  labetalol (NORMODYNE;TRANDATE) injection syringe 10 mg, 10 mg, IntraVENous, Q15 Min PRN **OR** hydrALAZINE (APRESOLINE) injection 10 mg, 10 mg, IntraVENous, Q15 Min PRN  insulin lispro (HUMALOG) injection vial 0-18 Units, 0-18 Units, SubCUTAneous, TID WC  [Held by provider] insulin lispro (HUMALOG) injection vial 0-9 Units, 0-9 Units, SubCUTAneous, Nightly  0.9 % sodium chloride infusion, , IntraVENous, Continuous  insulin lispro (HUMALOG) injection vial 0-6 Units, 0-6 Units, SubCUTAneous, Nightly  dexamethasone (DECADRON) injection 6 mg, 6 mg, IntraVENous, Q12H  insulin lispro (HUMALOG) injection vial 5 Units, 5 Units, SubCUTAneous, TID AC  busPIRone (BUSPAR) tablet 10 mg, 10 mg, Oral, TID  budesonide (PULMICORT) nebulizer suspension 500 mcg, 0.5 mg, Nebulization, BID  sodium chloride flush 0.9 % injection 5-40 mL, 5-40 mL, IntraVENous, 2 times per day  sodium chloride flush 0.9 % injection 5-40 mL, 5-40 mL, IntraVENous, PRN  0.9 % sodium chloride infusion, 25 mL, IntraVENous, PRN  ondansetron (ZOFRAN-ODT) disintegrating tablet 4 mg, 4 mg, Oral, Q8H PRN **OR** ondansetron (ZOFRAN) injection 4 mg, 4 mg, IntraVENous, Q6H PRN  polyethylene glycol (GLYCOLAX) packet 17 g, 17 g, Oral, Daily PRN  acetaminophen (TYLENOL) tablet 650 mg, 650 mg, Oral, Q6H PRN **OR** acetaminophen (TYLENOL) suppository 650 mg, 650 mg, Rectal, Q6H PRN  racepinephrine HCl (VAPONEFPRIN) 2.25 % nebulizer solution NEBU 11.25 mg, 11.25 mg, Nebulization, Q4H PRN  sodium chloride nebulizer 0.9 % solution 3 mL, 3 mL, Nebulization, Q4H PRN  [Held by provider] aspirin chewable tablet 81 mg, 81 mg, Oral, Daily  diclofenac sodium (VOLTAREN) 1 % gel 2 g, 2 g, Topical, 4x Daily PRN  famotidine (PEPCID) tablet 20 mg, 20 mg, Oral, BID  [Held by provider] furosemide (LASIX) tablet 40 mg, 40 mg, Oral, Daily  hydrOXYzine (VISTARIL) capsule 25 mg, 25 mg, Oral, 4x Daily PRN  melatonin tablet 6 mg, 6 mg, Oral, Nightly  nitroGLYCERIN (NITROSTAT) SL tablet 0.4 mg, 0.4 mg, SubLINGual, Q5 Min PRN  polycarbophil (FIBERCON) tablet 625 mg, 625 mg, Oral, Daily  rosuvastatin (CRESTOR) tablet 10 mg, 10 mg, Oral, Nightly  senna (SENOKOT) tablet 8.6 mg, 1 tablet, Oral, BID PRN  guaiFENesin (MUCINEX) extended release tablet 600 mg, 600 mg, Oral, BID  metoprolol succinate (TOPROL XL) extended release tablet 25 mg, 25 mg, Oral, Daily  glucagon (rDNA) injection 1 mg, 1 mg, IntraMUSCular, PRN  dextrose 5 % solution, 100 mL/hr, IntraVENous, PRN  glucose chewable tablet 4 each, 4 tablet, Oral, PRN  dextrose bolus (hypoglycemia) 10% 125 mL, 125 mL, IntraVENous, PRN **OR** dextrose bolus (hypoglycemia) 10% 250 mL, 250 mL, IntraVENous, PRN    ASSESSMENT AND PLAN    Noy Ayala is a 82-year-old patient with glottic stenosis of unclear etiology. She is now postop day 2 from suspension microlaryngoscopy with airway balloon dilation, and steroid injection. Her airway dilated adequately and there appeared to be minimal scarring around her cricoarytenoid complex. Culture of the area on the right-hand side was negative. Flexible nasolaryngoscopy prior to surgery and after surgery; there was no appreciable improvement in her glottic aperture. 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 Perhaps there is a component of paroxysmal vocal fold motion as anxiety certainly seems to worsen her breathing, as she has attacks where her breathing is acutely worse that are relieved by anxiolytics. She is also perseverating on the role that Mucinex plays in her breathing. I tried to explain the Mucinex neither helps nor hurts her ability to move her vocal cords. Bilateral vocal fold paresis, with respiratory failure  - Proceed with tracheostomy today. - Continue step down for 3+ days for trach teaching.   - Okay to resume normal diet after surgery. - Will downsize trach next week.       Electronically signed by Paco Finley MD on 4/1/2022 at 2:27 PM

## 2022-04-01 NOTE — ANESTHESIA PRE PROCEDURE
Department of Anesthesiology  Preprocedure Note       Name:  Justin Ferrara   Age:  73 y.o.  :  1950                                          MRN:  [id number]         Date:  2022      Surgeon: Kamila Brizuela):  Zaheer Burgos MD    Procedure: Procedure(s):  TRACHEOTOMY TUBE REPLACEMENT    Medications prior to admission:   Prior to Admission medications    Medication Sig Start Date End Date Taking? Authorizing Provider   predniSONE (DELTASONE) 10 MG tablet Take 4 tablets by mouth daily for 3 days, THEN 2 tablets daily for 3 days, THEN 1 tablet daily for 3 days, THEN 0.5 tablets daily for 3 days. 3/23/22 4/4/22  Paula Angulo DO   glipiZIDE (GLUCOTROL) 5 MG tablet Take 1 tablet by mouth 2 times daily (with meals) TAKE AS PREVIOUSLY USED AT HOME 3/23/22   Katelin Gracia MD   hydrOXYzine (VISTARIL) 25 MG capsule Take 1 capsule by mouth 4 times daily as needed for Anxiety  Patient not taking: Reported on 3/27/2022 1/27/22   Bebeto Ochoa MD   albuterol sulfate  (90 Base) MCG/ACT inhaler Inhale 2 puffs into the lungs every 6 hours as needed for Wheezing  Patient not taking: Reported on 3/27/2022 1/27/22   Bebeto Ochoa MD   miconazole (MICOTIN) 2 % powder Apply topically 2 times daily.   Patient not taking: Reported on 3/27/2022 1/27/22   Bebeto Ochoa MD   diclofenac sodium (VOLTAREN) 1 % GEL Apply 2 g topically 4 times daily as needed for Pain 22   Bebeto Ochoa MD   furosemide (LASIX) 40 MG tablet Take 1 tablet by mouth daily  Patient not taking: Reported on 3/27/2022 1/28/22   Bebeto Ochoa MD   docusate sodium (COLACE) 100 MG capsule Take 1 capsule by mouth 2 times daily  Patient not taking: Reported on 3/27/2022 1/27/22   Bebeto Ochoa MD   polycarbophil [facility]) 625 MG tablet Take 1 tablet by mouth daily  Patient not taking: Reported on 3/27/2022 1/28/22   Bebeto Ochoa MD   senna (SENOKOT) 8.6 MG tablet Take 1 tablet by mouth 2 times daily as needed (Constipation)  Patient not taking: Reported on 3/27/2022 1/27/22 5/27/22  Prudencio Davidson MD   melatonin 3 MG TABS tablet Take 2 tablets by mouth nightly 1/27/22   Prudencio Davidson MD   famotidine (PEPCID) 20 MG tablet Take 1 tablet by mouth 2 times daily  Patient not taking: Reported on 3/27/2022 1/27/22   Prudencio Davidson MD   OXYGEN Inhale 2 L/min into the lungs continuous prn (during activities such as walking) 1/27/22   Prudencio Davidson MD   rosuvastatin (CRESTOR) 10 MG tablet Take 10 mg by mouth daily    Historical Provider, MD   nitroGLYCERIN (NITROSTAT) 0.4 MG SL tablet Place 0.4 mg under the tongue every 5 minutes as needed for Chest pain up to max of 3 total doses. If no relief after 1 dose, call 911. Patient not taking: Reported on 3/27/2022    Historical Provider, MD   aspirin 81 MG chewable tablet Take 81 mg by mouth daily    Historical Provider, MD       Current medications:    No current facility-administered medications for this visit. No current outpatient medications on file.      Facility-Administered Medications Ordered in Other Visits   Medication Dose Route Frequency Provider Last Rate Last Admin    insulin glargine (LANTUS) injection vial 30 Units  30 Units SubCUTAneous Nightly Sameer Sandoval MD        ipratropium-albuterol (DUONEB) nebulizer solution 1 ampule  1 ampule Inhalation Q4H PRN Mirnaa Host, DO        insulin lispro (HUMALOG) injection vial 0-18 Units  0-18 Units SubCUTAneous TID  Sonfreddya Host, DO   3 Units at 04/01/22 1126    [Held by provider] insulin lispro (HUMALOG) injection vial 0-9 Units  0-9 Units SubCUTAneous Nightly Sonjia Host, DO        0.9 % sodium chloride infusion   IntraVENous Continuous Sonjia Host, DO   Stopped at 04/01/22 1150    insulin lispro (HUMALOG) injection vial 0-6 Units  0-6 Units SubCUTAneous Nightly Sonjia Host, DO   5 Units at 03/31/22 2050    dexamethasone (DECADRON) injection 6 mg  6 mg IntraVENous Q12H Sabrina Pittman MD   6 mg at 04/01/22 0255    insulin lispro (HUMALOG) injection vial 5 Units  5 Units SubCUTAneous TID AC Martina Benton V, DO   5 Units at 04/01/22 1127    busPIRone (BUSPAR) tablet 10 mg  10 mg Oral TID Ariadna Form V, DO   10 mg at 04/01/22 0836    budesonide (PULMICORT) nebulizer suspension 500 mcg  0.5 mg Nebulization BID Zhanna Muniz, APRN - CNP   500 mcg at 04/01/22 0816    sodium chloride flush 0.9 % injection 5-40 mL  5-40 mL IntraVENous 2 times per day Ariadna Form V, DO   10 mL at 03/31/22 0919    sodium chloride flush 0.9 % injection 5-40 mL  5-40 mL IntraVENous PRN Martina Benton V, DO        0.9 % sodium chloride infusion  25 mL IntraVENous PRN Martina Benton V, DO        ondansetron (ZOFRAN-ODT) disintegrating tablet 4 mg  4 mg Oral Q8H PRN Martina Benton V, DO        Or    ondansetron (ZOFRAN) injection 4 mg  4 mg IntraVENous Q6H PRN Martina Benton V, DO        polyethylene glycol (GLYCOLAX) packet 17 g  17 g Oral Daily PRN Martina Benton V, DO        acetaminophen (TYLENOL) tablet 650 mg  650 mg Oral Q6H PRN Martina Benton V, DO   650 mg at 03/29/22 0051    Or    acetaminophen (TYLENOL) suppository 650 mg  650 mg Rectal Q6H PRN [place] Sack, DO        racepinephrine HCl (VAPONEFPRIN) 2.25 % nebulizer solution NEBU 11.25 mg  11.25 mg Nebulization Q4H PRN Martina Benton V, DO        sodium chloride nebulizer 0.9 % solution 3 mL  3 mL Nebulization Q4H PRN Martina Benton V, DO        [Held by provider] aspirin chewable tablet 81 mg  81 mg Oral Daily Martina Benton V, DO        diclofenac sodium (VOLTAREN) 1 % gel 2 g  2 g Topical 4x Daily PRN Martina Benton V, DO        famotidine (PEPCID) tablet 20 mg  20 mg Oral BID Martina Benton V, DO   20 mg at 04/01/22 0843    [Held by provider] furosemide (LASIX) tablet 40 mg  40 mg Oral Daily Martina Benton V, DO   40 mg at 03/31/22 0916    hydrOXYzine (VISTARIL) capsule 25 mg  25 mg Oral 4x Daily PRN Martina Bhardwaj V, DO   25 mg at 04/01/22 0255    melatonin tablet 6 mg  6 mg Oral Nightly Martina Bhardwaj V, DO   6 mg at 03/31/22 2033    nitroGLYCERIN Diagnosis Date    Coronary artery disease     COVID     Primary hypertension     Type 2 diabetes mellitus ([facility] [address] 2018       Past Surgical History:        Procedure Laterality Date    CARDIAC SURGERY      CATARACT REMOVAL Bilateral 2020     SECTION      3 times    CORONARY ANGIOPLASTY WITH STENT PLACEMENT      stenting twice    EYE SURGERY      HIATAL HERNIA REPAIR      late     HYSTERECTOMY, TOTAL ABDOMINAL      in     LARYNGOSCOPY N/A 3/22/2022    SUSPENSON LARYNGOSCOPY WITH JET VENT, DILATION performed by Angela Mari MD at [address] 3/28/2022    SUSPENSION MICROLARYNGOSCOPY WITH BALLOON DILATION AND JET VENT, KENALOG INJECTION performed by Angela Mari MD at [address] [facility]      in        Social History:    Social History     Tobacco Use    Smoking status: Never Smoker    Smokeless tobacco: Never Used   Substance Use Topics    Alcohol use: Not Currently     Comment: drinks 1 glass of wine cooler or wine about 3 times per year                                Counseling given: Not Answered      Vital Signs (Current): There were no vitals filed for this visit.                                            BP Readings from Last 3 Encounters:   22 132/82   22 (!) 74/48   22 130/66       NPO Status:                                                                                 BMI:   Wt Readings from Last 3 Encounters:   22 188 lb 11.1 oz (85.6 kg)   22 190 lb 11.2 oz (86.5 kg)   22 196 lb (88.9 kg)     There is no height or weight on file to calculate BMI.    CBC:   Lab Results   Component Value Date    WBC 17.9 2022    RBC 4.51 2022    HGB 13.1 2022    HCT 39.5 2022    MCV 87.6 2022     2022       CMP:   Lab Results   Component Value Date     2022    K 4.6 2022    K 5.0 2022     2022    CO2 28 2022    BUN 32 2022 CREATININE 0.6 04/01/2022    LABGLOM >90 04/01/2022    GLUCOSE 273 04/01/2022    PROT 7.6 03/18/2022    CALCIUM 9.1 04/01/2022    BILITOT 0.2 03/18/2022    ALKPHOS 101 03/18/2022    AST 16 03/18/2022    ALT 9 03/18/2022       POC Tests:   Recent Labs     04/01/22  1119   POCGLU 198*       Coags: No results found for: PROTIME, INR, APTT    HCG (If Applicable): No results found for: PREGTESTUR, PREGSERUM, HCG, HCGQUANT     ABGs: No results found for: PHART, PO2ART, AJY0EAG, DRR5WNH, BEART, X9AUORYO     Type & Screen (If Applicable):  No results found for: LABABO, LABRH    Drug/Infectious Status (If Applicable):  No results found for: HIV, HEPCAB    COVID-19 Screening (If Applicable):   Lab Results   Component Value Date    COVID19 detected 12/17/2021           Anesthesia Evaluation  Patient summary reviewed no history of anesthetic complications:   Airway: Mallampati: II  TM distance: >3 FB   Neck ROM: full  Mouth opening: > = 3 FB Dental: normal exam         Pulmonary:   (+) pneumonia:  decreased breath sounds,                             Cardiovascular:    (+) hypertension:, CAD:,       ECG reviewed                        Neuro/Psych:   (+) neuromuscular disease:,             GI/Hepatic/Renal:             Endo/Other:    (+) Diabetes, . Pt had no PAT visit       Abdominal:             Vascular: Other Findings:               Anesthesia Plan      general     ASA 3       Induction: intravenous. MIPS: Postoperative opioids intended and Prophylactic antiemetics administered. Anesthetic plan and risks discussed with patient and spouse.       Plan discussed with CRNA and surgical team.                  Shirley Boland MD   4/1/2022

## 2022-04-01 NOTE — PLAN OF CARE
Problem: RESPIRATORY  Goal: Clear lung sounds  Description: Clear lung sounds  Outcome: Ongoing     Problem: OXYGENATION/RESPIRATORY FUNCTION  Goal: Patient will maintain patent airway  4/1/2022 0128 by Jared Rodriguez RCP  Outcome: Ongoing  3/31/2022 2036 by Dirk Gonzales RN  Outcome: Ongoing  Goal: Patient will achieve/maintain normal respiratory rate/effort  Description: Respiratory rate and effort will be within normal limits for the patient  4/1/2022 0128 by Jared Rodriguez RCP  Outcome: Ongoing  3/31/2022 2036 by Dirk Gonzales RN  Outcome: Ongoing     Patient remains on the HFNC for humidification purposes. Received nebulizer treatments as ordered.

## 2022-04-01 NOTE — PROGRESS NOTES
Patient received in preop holding. Patient hooked up to monitor for oxygen stating 100% on 15 liters nonrebreather, temperature 96.7 Farenheit.

## 2022-04-01 NOTE — PROGRESS NOTES
[address] THERAPY MISSED TREATMENT NOTE  [facility] ICU STEPDOWN TELEMETRY 4K  [id number]      Date: 2022  Patient Name: Diana Howe        CSN: [id number]   : 1950  (73 y.o.)  Gender: female   Referring Practitioner: Karen Gamble DO  Diagnosis: Shortness of breath         REASON FOR MISSED TREATMENT: Patient having trach placement; will attempt at next available time

## 2022-04-02 LAB
ANION GAP SERPL CALCULATED.3IONS-SCNC: 10 MEQ/L (ref 8–16)
ANION GAP SERPL CALCULATED.3IONS-SCNC: 12 MEQ/L (ref 8–16)
BUN BLDV-MCNC: 24 MG/DL (ref 7–22)
BUN BLDV-MCNC: 26 MG/DL (ref 7–22)
CALCIUM SERPL-MCNC: 9.3 MG/DL (ref 8.5–10.5)
CALCIUM SERPL-MCNC: 9.7 MG/DL (ref 8.5–10.5)
CHLORIDE BLD-SCNC: 100 MEQ/L (ref 98–111)
CHLORIDE BLD-SCNC: 101 MEQ/L (ref 98–111)
CO2: 25 MEQ/L (ref 23–33)
CO2: 29 MEQ/L (ref 23–33)
CREAT SERPL-MCNC: 0.6 MG/DL (ref 0.4–1.2)
CREAT SERPL-MCNC: 0.6 MG/DL (ref 0.4–1.2)
ERYTHROCYTE [DISTWIDTH] IN BLOOD BY AUTOMATED COUNT: 12.5 % (ref 11.5–14.5)
ERYTHROCYTE [DISTWIDTH] IN BLOOD BY AUTOMATED COUNT: 41.4 FL (ref 35–45)
GFR SERPL CREATININE-BSD FRML MDRD: > 90 ML/MIN/1.73M2
GFR SERPL CREATININE-BSD FRML MDRD: > 90 ML/MIN/1.73M2
GLUCOSE BLD-MCNC: 109 MG/DL (ref 70–108)
GLUCOSE BLD-MCNC: 128 MG/DL (ref 70–108)
GLUCOSE BLD-MCNC: 306 MG/DL (ref 70–108)
GLUCOSE BLD-MCNC: 321 MG/DL (ref 70–108)
GLUCOSE BLD-MCNC: 325 MG/DL (ref 70–108)
GLUCOSE BLD-MCNC: 96 MG/DL (ref 70–108)
HCT VFR BLD CALC: 43 % (ref 37–47)
HEMOGLOBIN: 13.6 GM/DL (ref 12–16)
MCH RBC QN AUTO: 28.8 PG (ref 26–33)
MCHC RBC AUTO-ENTMCNC: 31.6 GM/DL (ref 32.2–35.5)
MCV RBC AUTO: 90.9 FL (ref 81–99)
PLATELET # BLD: 215 THOU/MM3 (ref 130–400)
PMV BLD AUTO: 10.7 FL (ref 9.4–12.4)
POTASSIUM SERPL-SCNC: 5.2 MEQ/L (ref 3.5–5.2)
POTASSIUM SERPL-SCNC: 5.3 MEQ/L (ref 3.5–5.2)
RBC # BLD: 4.73 MILL/MM3 (ref 4.2–5.4)
SODIUM BLD-SCNC: 137 MEQ/L (ref 135–145)
SODIUM BLD-SCNC: 140 MEQ/L (ref 135–145)
WBC # BLD: 24.2 THOU/MM3 (ref 4.8–10.8)

## 2022-04-02 PROCEDURE — 82948 REAGENT STRIP/BLOOD GLUCOSE: CPT

## 2022-04-02 PROCEDURE — 6370000000 HC RX 637 (ALT 250 FOR IP): Performed by: STUDENT IN AN ORGANIZED HEALTH CARE EDUCATION/TRAINING PROGRAM

## 2022-04-02 PROCEDURE — 94640 AIRWAY INHALATION TREATMENT: CPT

## 2022-04-02 PROCEDURE — 2700000000 HC OXYGEN THERAPY PER DAY

## 2022-04-02 PROCEDURE — APPNB45 APP NON BILLABLE 31-45 MINUTES: Performed by: NURSE PRACTITIONER

## 2022-04-02 PROCEDURE — 6370000000 HC RX 637 (ALT 250 FOR IP): Performed by: INTERNAL MEDICINE

## 2022-04-02 PROCEDURE — 6360000002 HC RX W HCPCS: Performed by: OTOLARYNGOLOGY

## 2022-04-02 PROCEDURE — 2060000000 HC ICU INTERMEDIATE R&B

## 2022-04-02 PROCEDURE — 85027 COMPLETE CBC AUTOMATED: CPT

## 2022-04-02 PROCEDURE — 36415 COLL VENOUS BLD VENIPUNCTURE: CPT

## 2022-04-02 PROCEDURE — 99232 SBSQ HOSP IP/OBS MODERATE 35: CPT | Performed by: INTERNAL MEDICINE

## 2022-04-02 PROCEDURE — 6370000000 HC RX 637 (ALT 250 FOR IP)

## 2022-04-02 PROCEDURE — 6360000002 HC RX W HCPCS: Performed by: NURSE PRACTITIONER

## 2022-04-02 PROCEDURE — 2580000003 HC RX 258: Performed by: STUDENT IN AN ORGANIZED HEALTH CARE EDUCATION/TRAINING PROGRAM

## 2022-04-02 PROCEDURE — 94760 N-INVAS EAR/PLS OXIMETRY 1: CPT

## 2022-04-02 PROCEDURE — 80048 BASIC METABOLIC PNL TOTAL CA: CPT

## 2022-04-02 RX ORDER — INSULIN GLARGINE 100 [IU]/ML
5 INJECTION, SOLUTION SUBCUTANEOUS ONCE
Status: COMPLETED | OUTPATIENT
Start: 2022-04-02 | End: 2022-04-02

## 2022-04-02 RX ORDER — INSULIN GLARGINE 100 [IU]/ML
40 INJECTION, SOLUTION SUBCUTANEOUS NIGHTLY
Status: DISCONTINUED | OUTPATIENT
Start: 2022-04-02 | End: 2022-04-04

## 2022-04-02 RX ADMIN — INSULIN LISPRO 5 UNITS: 100 INJECTION, SOLUTION INTRAVENOUS; SUBCUTANEOUS at 16:59

## 2022-04-02 RX ADMIN — HYDROXYZINE PAMOATE 25 MG: 25 CAPSULE ORAL at 05:33

## 2022-04-02 RX ADMIN — ROSUVASTATIN CALCIUM 10 MG: 10 TABLET, FILM COATED ORAL at 20:30

## 2022-04-02 RX ADMIN — BUSPIRONE HYDROCHLORIDE 10 MG: 10 TABLET ORAL at 08:49

## 2022-04-02 RX ADMIN — INSULIN LISPRO 5 UNITS: 100 INJECTION, SOLUTION INTRAVENOUS; SUBCUTANEOUS at 08:29

## 2022-04-02 RX ADMIN — FAMOTIDINE 20 MG: 20 TABLET ORAL at 20:30

## 2022-04-02 RX ADMIN — BUDESONIDE 500 MCG: 0.5 INHALANT RESPIRATORY (INHALATION) at 07:19

## 2022-04-02 RX ADMIN — DEXAMETHASONE SODIUM PHOSPHATE 6 MG: 4 INJECTION, SOLUTION INTRA-ARTICULAR; INTRALESIONAL; INTRAMUSCULAR; INTRAVENOUS; SOFT TISSUE at 02:41

## 2022-04-02 RX ADMIN — CALCIUM POLYCARBOPHIL 625 MG: 625 TABLET, FILM COATED ORAL at 08:49

## 2022-04-02 RX ADMIN — INSULIN LISPRO 5 UNITS: 100 INJECTION, SOLUTION INTRAVENOUS; SUBCUTANEOUS at 12:13

## 2022-04-02 RX ADMIN — INSULIN GLARGINE 5 UNITS: 100 INJECTION, SOLUTION SUBCUTANEOUS at 10:20

## 2022-04-02 RX ADMIN — BUDESONIDE 500 MCG: 0.5 INHALANT RESPIRATORY (INHALATION) at 17:46

## 2022-04-02 RX ADMIN — METOPROLOL SUCCINATE 25 MG: 25 TABLET, FILM COATED, EXTENDED RELEASE ORAL at 08:49

## 2022-04-02 RX ADMIN — ACETAMINOPHEN 650 MG: 325 TABLET ORAL at 05:33

## 2022-04-02 RX ADMIN — DEXAMETHASONE SODIUM PHOSPHATE 6 MG: 4 INJECTION, SOLUTION INTRA-ARTICULAR; INTRALESIONAL; INTRAMUSCULAR; INTRAVENOUS; SOFT TISSUE at 14:18

## 2022-04-02 RX ADMIN — HYDROXYZINE PAMOATE 25 MG: 25 CAPSULE ORAL at 14:18

## 2022-04-02 RX ADMIN — INSULIN GLARGINE 5 UNITS: 100 INJECTION, SOLUTION SUBCUTANEOUS at 12:16

## 2022-04-02 RX ADMIN — INSULIN LISPRO 12 UNITS: 100 INJECTION, SOLUTION INTRAVENOUS; SUBCUTANEOUS at 12:14

## 2022-04-02 RX ADMIN — INSULIN LISPRO 12 UNITS: 100 INJECTION, SOLUTION INTRAVENOUS; SUBCUTANEOUS at 08:26

## 2022-04-02 RX ADMIN — SODIUM CHLORIDE, PRESERVATIVE FREE 10 ML: 5 INJECTION INTRAVENOUS at 20:30

## 2022-04-02 RX ADMIN — GUAIFENESIN 600 MG: 600 TABLET, EXTENDED RELEASE ORAL at 08:49

## 2022-04-02 RX ADMIN — GUAIFENESIN 600 MG: 600 TABLET, EXTENDED RELEASE ORAL at 20:30

## 2022-04-02 RX ADMIN — Medication 6 MG: at 20:30

## 2022-04-02 RX ADMIN — INSULIN GLARGINE 40 UNITS: 100 INJECTION, SOLUTION SUBCUTANEOUS at 20:24

## 2022-04-02 RX ADMIN — BUSPIRONE HYDROCHLORIDE 10 MG: 10 TABLET ORAL at 20:30

## 2022-04-02 RX ADMIN — FAMOTIDINE 20 MG: 20 TABLET ORAL at 08:49

## 2022-04-02 RX ADMIN — BUSPIRONE HYDROCHLORIDE 10 MG: 10 TABLET ORAL at 14:17

## 2022-04-02 RX ADMIN — SODIUM CHLORIDE, PRESERVATIVE FREE 10 ML: 5 INJECTION INTRAVENOUS at 08:50

## 2022-04-02 ASSESSMENT — PAIN DESCRIPTION - DESCRIPTORS: DESCRIPTORS: ACHING

## 2022-04-02 ASSESSMENT — PAIN DESCRIPTION - PAIN TYPE: TYPE: SURGICAL PAIN

## 2022-04-02 ASSESSMENT — PAIN DESCRIPTION - ONSET: ONSET: ON-GOING

## 2022-04-02 ASSESSMENT — PAIN SCALES - GENERAL
PAINLEVEL_OUTOF10: 2
PAINLEVEL_OUTOF10: 0

## 2022-04-02 ASSESSMENT — PAIN - FUNCTIONAL ASSESSMENT: PAIN_FUNCTIONAL_ASSESSMENT: PREVENTS OR INTERFERES SOME ACTIVE ACTIVITIES AND ADLS

## 2022-04-02 ASSESSMENT — PAIN DESCRIPTION - PROGRESSION: CLINICAL_PROGRESSION: NOT CHANGED

## 2022-04-02 ASSESSMENT — PAIN DESCRIPTION - LOCATION: LOCATION: NECK

## 2022-04-02 NOTE — PROGRESS NOTES
Department of Otolaryngology  Progress Note    Chief Complaint:  POD 1 s/p tracheostomy placement with Dr Raheel Urrutia secondary to glottic stenosis    SUBJECTIVE:  Patient initially reports that she is \"not good\" because it took 10 minutes for her call light to be answered sometime last night and this scared her because she cannot voice to call for anyone. She states if she would have known how bad and scared she felt, she would have not had a tracheostomy and just  instead. After lengthy discussion with patient regarding her airway, tracheostomy, short and long tem goals, she agrees that she will try to remain positive and push through. We discussed excess secretions and need for suctioning, which she does not like. Otherwise, she is doing well today. Currently taking breakfast tray without any issues. On trach mask. A complete multi-organ review of systems was performed using a new patient questionnaire, and reviewed by me. ENT:  negative except as noted in HPI  CONSTITUTIONAL:  negative except as noted in HPI  EYES:  negative except as noted in HPI  RESPIRATORY:  negative except as noted in HPI  CARDIOVASCULAR:  negative except as noted in HPI  GASTROINTESTINAL:  negative except as noted in HPI  GENITOURINARY:  negative except as noted in HPI  MUSCULOSKELETAL:  negative except as noted in HPI  SKIN:  negative except as noted in HPI  ENDOCRINE/METABOLIC: negative except as noted in HPI  HEMATOLOGIC/LYMPHATIC:  negative except as noted in HPI  ALLERGY/IMMUN: negative except as noted in HPI  NEUROLOGICAL:  negative except as noted in HPI  BEHAVIOR/PSYCH:  negative except as noted in HPI    OBJECTIVE      Physical  VITALS:  BP (!) 149/76   Pulse 75   Temp 98.3 °F (36.8 °C) (Oral)   Resp 18   Ht 5' 2\" (1.575 m)   Wt 192 lb 9.6 oz (87.4 kg)   LMP  (LMP Unknown)   SpO2 99%   BMI 35.23 kg/m²     Alert, oriented and cooperative; tearful.   Currently aphonic; showed her how to finger occlude the tracheostomy tube. Shiley #6 cuffed tracheostomy tube in place; flange sutures intact; trach ties secure. Excess mucous and bloody secretions at site. On trach mask. No distress.      Data  CBC with Differential:    Lab Results   Component Value Date    WBC 17.9 03/31/2022    RBC 4.51 03/31/2022    HGB 13.1 03/31/2022    HCT 39.5 03/31/2022     03/31/2022    MCV 87.6 03/31/2022    MCH 29.0 03/31/2022    MCHC 33.2 03/31/2022    NRBC 0 03/27/2022    SEGSPCT 65.9 03/27/2022    MONOPCT 7.6 03/27/2022    MONOSABS 1.1 03/27/2022    LYMPHSABS 3.2 03/27/2022    EOSABS 0.1 03/27/2022    BASOSABS 0.1 03/27/2022     BMP:    Lab Results   Component Value Date     04/01/2022    K 4.6 04/01/2022    K 5.0 03/23/2022     04/01/2022    CO2 28 04/01/2022    BUN 32 04/01/2022    LABALBU 4.3 03/18/2022    CREATININE 0.6 04/01/2022    CALCIUM 9.1 04/01/2022    LABGLOM >90 04/01/2022    GLUCOSE 273 04/01/2022       Inpatient Medications  Current Facility-Administered Medications: insulin glargine (LANTUS) injection vial 5 Units, 5 Units, SubCUTAneous, Once  insulin glargine (LANTUS) injection vial 30 Units, 30 Units, SubCUTAneous, Nightly  ipratropium-albuterol (DUONEB) nebulizer solution 1 ampule, 1 ampule, Inhalation, Q4H PRN  insulin lispro (HUMALOG) injection vial 0-18 Units, 0-18 Units, SubCUTAneous, TID WC  [Held by provider] insulin lispro (HUMALOG) injection vial 0-9 Units, 0-9 Units, SubCUTAneous, Nightly  insulin lispro (HUMALOG) injection vial 0-6 Units, 0-6 Units, SubCUTAneous, Nightly  dexamethasone (DECADRON) injection 6 mg, 6 mg, IntraVENous, Q12H  insulin lispro (HUMALOG) injection vial 5 Units, 5 Units, SubCUTAneous, TID AC  busPIRone (BUSPAR) tablet 10 mg, 10 mg, Oral, TID  budesonide (PULMICORT) nebulizer suspension 500 mcg, 0.5 mg, Nebulization, BID  sodium chloride flush 0.9 % injection 5-40 mL, 5-40 mL, IntraVENous, 2 times per day  sodium chloride flush 0.9 % injection 5-40 mL, 5-40 mL, IntraVENous, PRN  0.9 % sodium chloride infusion, 25 mL, IntraVENous, PRN  ondansetron (ZOFRAN-ODT) disintegrating tablet 4 mg, 4 mg, Oral, Q8H PRN **OR** ondansetron (ZOFRAN) injection 4 mg, 4 mg, IntraVENous, Q6H PRN  polyethylene glycol (GLYCOLAX) packet 17 g, 17 g, Oral, Daily PRN  acetaminophen (TYLENOL) tablet 650 mg, 650 mg, Oral, Q6H PRN **OR** acetaminophen (TYLENOL) suppository 650 mg, 650 mg, Rectal, Q6H PRN  racepinephrine HCl (VAPONEFPRIN) 2.25 % nebulizer solution NEBU 11.25 mg, 11.25 mg, Nebulization, Q4H PRN  sodium chloride nebulizer 0.9 % solution 3 mL, 3 mL, Nebulization, Q4H PRN  [Held by provider] aspirin chewable tablet 81 mg, 81 mg, Oral, Daily  diclofenac sodium (VOLTAREN) 1 % gel 2 g, 2 g, Topical, 4x Daily PRN  famotidine (PEPCID) tablet 20 mg, 20 mg, Oral, BID  [Held by provider] furosemide (LASIX) tablet 40 mg, 40 mg, Oral, Daily  hydrOXYzine (VISTARIL) capsule 25 mg, 25 mg, Oral, 4x Daily PRN  melatonin tablet 6 mg, 6 mg, Oral, Nightly  nitroGLYCERIN (NITROSTAT) SL tablet 0.4 mg, 0.4 mg, SubLINGual, Q5 Min PRN  polycarbophil (FIBERCON) tablet 625 mg, 625 mg, Oral, Daily  rosuvastatin (CRESTOR) tablet 10 mg, 10 mg, Oral, Nightly  senna (SENOKOT) tablet 8.6 mg, 1 tablet, Oral, BID PRN  guaiFENesin (MUCINEX) extended release tablet 600 mg, 600 mg, Oral, BID  metoprolol succinate (TOPROL XL) extended release tablet 25 mg, 25 mg, Oral, Daily  glucagon (rDNA) injection 1 mg, 1 mg, IntraMUSCular, PRN  dextrose 5 % solution, 100 mL/hr, IntraVENous, PRN  glucose chewable tablet 4 each, 4 tablet, Oral, PRN  dextrose bolus (hypoglycemia) 10% 125 mL, 125 mL, IntraVENous, PRN **OR** dextrose bolus (hypoglycemia) 10% 250 mL, 250 mL, IntraVENous, PRN    ASSESSMENT AND PLAN    Glottic stenosis s/p tracheostomy placement 4/1/2022 with Dr Gina Denise  - Humidification per trach mask  - Tracheostomy care per protocol- okay to change soiled ties and drain sponge, clean around stoma  - Continue with tracheostomy teaching and encourage self care  - Plan change to uncuffed tube this week  - Discharge planning with supplies and home health; orders placed  - Medical management per primary team      Electronically signed by WYATT Caldwell CNP on 4/2/2022 at 9:03 AM

## 2022-04-02 NOTE — PROGRESS NOTES
I met with the patient at bedside along with her  and other family members to discuss what I understood about her airway and to make near future plans regarding her care. She has since been provided with a tracheostomy to secure her airway but apparently has been reluctant to allow herself to be suctioned. She stated \"it hurts\" and did not understand how important it is to have secretions, particularly bloody secretions, removed from her airway. I made it very clear that it is absolutely critical that not only she allow this to be done for her while she is here but that she and her  both learn how to irrigate her tracheostomy and take care of it with the suction system because a mucus or mucus and blood plug can fully blocked her airway off and potentially smother her if it is not taking care of. I also reviewed with her what I know through my encounters with Dr. 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 I drew a picture for her that told her what the problems were within her airway and the potential that she would need something done to animate her larynx by improving the mobility of the better side and possibly by lateralizing the more impaired side hopefully to get her tracheostomy out sometime in the near future. With the use of the picture I was able to explain to her the specific issues that were looking at and I told her that depending on how she is doing in the next couple of days, I may put her off to sleep for a few minutes to check her larynx once again while she is still in-house. If that turns out not to be a reasonable option, I will see her in the clinic and perform an endoscopy using a flexible scope and topical anesthetic and plan for her next care in the operating room thereafter.     The patient and her  reported understanding the basis of my recommendations as well as understanding the care needs that she has that have to be addressed in order to be able to reasonably anticipate removal of the tracheostomy once she is healed. They both reported being pleased with the outcome of their visit with me and being willing to proceed as recommended. Leonie Childers.  Doreen Julian, [facility]  Cell: [phone]

## 2022-04-02 NOTE — PLAN OF CARE
Problem: Falls - Risk of:  Goal: Will remain free from falls  Description: Will remain free from falls  Outcome: Ongoing  Note: Patient remains free of falls this shift. Alert and oriented, uses call light appropriately. Patient up ad annie and encouraged to ambulate throughout room. Problem: Daily Care:  Goal: Daily care needs are met  Description: Daily care needs are met  Outcome: Ongoing  Note: Patient identifies needs as met throughout shift. Problem: Pain:  Goal: Pain level will decrease  Description: Pain level will decrease  Outcome: Ongoing  Note: Patient denies any pain this shift. PRN pain medication available as needed. Educated patient on non-pharmalogical interventions to assist with pain control. Problem: Skin Integrity:  Goal: Skin integrity will stabilize  Description: Skin integrity will stabilize  Outcome: Ongoing  Note: Patient has new tracheostomy placement with red tinged oozing secretions. Problem: Discharge Planning:  Goal: Patients continuum of care needs are met  Description: Patients continuum of care needs are met  Outcome: Ongoing  Note: Patient plans to discharge home with spouse. Patient will need thorough education prior to discharge regarding new tracheostomy placement. Problem: OXYGENATION/RESPIRATORY FUNCTION  Goal: Patient will maintain patent airway  4/2/2022 1442 by Angella Saldana RN  Outcome: Ongoing  Note: Patient has patent tracheostomy in place. Tracheostomy has moderate, red tinged oozing secretions. Problem: Skin Integrity:  Goal: Absence of new skin breakdown  Description: Absence of new skin breakdown  Outcome: Ongoing  Note: No new skin breakdown this shift. Patient up ad annie and encouraged to ambulate throughout shift. Care plan reviewed with patient and spouse. Patient and spouse verbalize understanding of the plan of care and contribute to goal setting.

## 2022-04-02 NOTE — PROGRESS NOTES
Medicine Progress Note    Patient:  Chung Dennis    Unit/Bed:4-01/001-A  YOB: 1950  MRN: [id number]   Acct: [de-identified]   PCP: Edith Kamara  Date of Admission: 3/27/2022    Hospital Course     Briefly, pt Chung Dennis is a 73 y.o. female with a PMH of essential HTN, uncontrolled NIDDMT2 c/b diabetic neuropathy A1c 9.9% 12/21/21 on glipizide only, H/o COVID-19 infection requiring intubation c/b tracheal stenosis s/p suspension microlayngoscopy w/ relief of glottic stenosis / airway balloon dilation / laryngeal steroid injection 03/22/22, CAD s/p PCI w/ BMS to LAD in 2008 on ASA 81 mg daily, who presented with worsening shortness of breath 03/27/22 and was found to have significant recurrence glottic stenosis. Pt was also found to be hyperglycemic 2/2 initiation of steroids requiring insulin. Tracheostomy 04/01/22 by ENT. 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 Requiring [address]. Inspiratory and expiratory stidor on auscultation c/w fixed obstruction from glottic stenosis.  S/p suspension microlayngoscopy w/ relief of glottic stenosis / airway balloon dilation / laryngeal steroid injection 03/22/22 by Dr. Lindajean Habermann, MD. 6 Shiley Cuffed Tracheostomy placed 04/01/22 by Dr. Lindajean Habermann, MD.   Plan:   -ENT consulted, appreciate recs     +Continue HD steroids     +Continue humidification per trach mask     +Continue tracheostomy teaching and encourage self care     +Continue tracheostomy management     +Plan to change to uncuffed tube this week     +Continuous pulse oximetry     +Tracheostomy today; maintenance IV fluids  -Aspiration precautions  -Acapella as tolerated  -IS as tolerated  -Wean O2 requirement as tolerated    #Uncontrolled NIDDMT2 c/b bilateral neuropathy A1c 9.9 in 12/21/21: Active  Takes glipizide only at home  Diabetic Nephropathy: no screening in chart  Diabetic Retinopathy: no screening in chart  Diabetic Neuropathy: Bilateral LE sensation diminished; burning sensation present, not currently on pharmacotherapy  Plan:  -Hold ALL OGAs  -Continue High Dose SSI Algorithm  -Continue insulin glargine; Increase to 30 units nightly  -Hypoglycemia protocol  -POCT glucose qAC/HS    #Steroid-induced Leukocytosis: Stable  Edith concurrently w/ initiation of steroids. No s/s or evidence of infection at this time. Plan: Continue to monitor with CBC    #Non-obstructive CAD s/p PCI w/ BMS of LAD in 2008: Stable  Takes ASA 81 mg daily  Plan: Hold ASA 81 mg for tracheostomy; resume once hemostasis achieved    #Chronic Systolic + Diastolic Heart Failure with EF 60% on 03/18/22, NYHA Class III 2/2 ICM: Stable  GDMT at home: none, started on metoprolol succinate 25 mg daily. Diuretics at home: furosemide. Follows in HF Clinic: No.   Plan:   -Strict I&Os  -Daily weights  -Refer to HF outpatient clinic on discharge if patient is amenable  -Continue metoprolol succinate 25 mg daily    #Acute on Chronic Anxiety: Stable  Started on LD Buspar  Plan: Continue Buspar    Dispo: 4k    Fluids: IVNS @ 75 cc/hr  Electrolyte: Replete as needed  Nutrition: NPO  GI: Pepcid    Lines/Tubes: Right forearm PIV 03/31/2022    DVT ppx: SCDs  PT/OT/SLP: PT/OT/SLP for further evaluation and management    Code status: Full Code No additional code details    Subjective     -Pt recovering nicely   -Trach mask in good position  -No acute events overnight  -No fevers, chills, nausea, or vomiting    Initial HPI     Cassie Navarrete is 73years old female who presented to the hospital for worsening shortness of breath. Past medical history significant with recent hospital admission for glottic stenosis (3/18-23/22), CAD SP PIC, hypertension, diabetes, anxiety.   Patient was recently admitted and managed for glottic stenosis with she underwent suspension microlaryngoscopy with relief of glottic stenosis, airway balloon dilation, and laryngeal steroid injection on 3/22/22. Patient was discharged home on 3/24. Since 3/25, patient has increase in mucus production which patient feeling \"something stuck in her throat\". She has been using Intensive spirometry at home which she recall that from yesterday she couldn't be able to get any air into the spirometry. Patient reported SOB has been worsening which she decided to to to ED. Denied any fever, chill, chest pain, yellow cough without sputum production, recent sick contact.     In the ED, patient O2 sat 93% on room air. Patient received Decadron 8 mg and racemic epi which her SOB has been improved and O2 sat 100% on room air.      Upon visit patient can talk in full conversation without respiratory distress. \" - Dr. Edgar Sanchez, DO     Objective     Vitals:     BP (!) 149/76   Pulse 75   Temp 98.3 °F (36.8 °C) (Oral)   Resp 18   Ht 5' 2\" (1.575 m)   Wt 192 lb 9.6 oz (87.4 kg)   LMP  (LMP Unknown)   SpO2 99%   BMI 35.23 kg/m²     [facility] Settings:                     FiO2 : 28 %     Intake and Output:       Intake/Output Summary (Last 24 hours) at 4/2/2022 1046  Last data filed at 4/2/2022 0815  Gross per 24 hour   Intake 2685.76 ml   Output 1450 ml   Net 1235.76 ml       Outpatient Medications:     Scheduled Meds:   insulin glargine  30 Units SubCUTAneous Nightly    insulin lispro  0-18 Units SubCUTAneous TID WC    [Held by provider] insulin lispro  0-9 Units SubCUTAneous Nightly    insulin lispro  0-6 Units SubCUTAneous Nightly    dexamethasone  6 mg IntraVENous Q12H    insulin lispro  5 Units SubCUTAneous TID AC    busPIRone  10 mg Oral TID    budesonide  0.5 mg Nebulization BID    sodium chloride flush  5-40 mL IntraVENous 2 times per day    [Held by provider] aspirin  81 mg Oral Daily    famotidine  20 mg Oral BID    [Held by provider] furosemide  40 mg Oral Daily    melatonin  6 mg Oral Nightly    polycarbophil  625 mg Oral Daily    rosuvastatin  10 mg Oral Nightly    guaiFENesin  600 mg Oral BID    metoprolol succinate  25 mg Oral Daily     Continuous Infusions:   sodium chloride      dextrose       PRN Meds:ipratropium-albuterol, sodium chloride flush, sodium chloride, ondansetron **OR** ondansetron, polyethylene glycol, acetaminophen **OR** acetaminophen, racepinephrine HCl, sodium chloride nebulizer, diclofenac sodium, hydrOXYzine, nitroGLYCERIN, senna, glucagon (rDNA), dextrose, glucose, dextrose bolus (hypoglycemia) **OR** dextrose bolus (hypoglycemia)    Physical Exam:     Physical Exam  Constitutional:       General: She is not in acute distress. Appearance: She is obese. She is not ill-appearing. HENT:      Head: Normocephalic and atraumatic. Nose: No congestion or rhinorrhea. Mouth/Throat:      Mouth: Mucous membranes are moist.      Pharynx: Oropharynx is clear. No oropharyngeal exudate or posterior oropharyngeal erythema. Eyes:      General: No scleral icterus. Extraocular Movements: Extraocular movements intact. Pupils: Pupils are equal, round, and reactive to light. Neck:      Trachea: Tracheostomy (good position, no erythema or purulence around incision site.) present. Cardiovascular:      Rate and Rhythm: Normal rate and regular rhythm. Pulses: Normal pulses. Heart sounds: Normal heart sounds. No murmur heard. No friction rub. No gallop. Pulmonary:      Effort: Pulmonary effort is normal.      Breath sounds: No stridor. Wheezing present. Abdominal:      General: Abdomen is flat. Palpations: Abdomen is soft. Tenderness: There is no abdominal tenderness. There is no guarding or rebound. Musculoskeletal:         General: Normal range of motion. Right lower leg: No edema. Left lower leg: No edema. Skin:     General: Skin is warm and dry. Capillary Refill: Capillary refill takes less than 2 seconds. Neurological:      General: No focal deficit present.       Mental Status: She is alert and oriented to person, place, and time. Psychiatric:         Mood and Affect: Mood normal.         Behavior: Behavior normal.         Labs:     Recent Labs     03/31/22  0830 04/02/22  0845   WBC 17.9* 24.2*   HGB 13.1 13.6   HCT 39.5 43.0    215   MPV 10.1 10.7     Recent Labs     03/31/22  0830 04/01/22  0502 04/02/22  0845    139 137   K 5.2 4.6 5.3*   CL 98 101 100   CO2 28 28 25   BUN 30* 32* 24*   CREATININE 0.7 0.6 0.6   GLUCOSE 265* 273* 325*     No results for input(s): BILITOT, BILIDIR, PROT, ALBUMIN, AST in the last 72 hours. Invalid input(s): ALK, LABALT  No results for input(s): TROPONINI, CKMB in the last 72 hours. Invalid input(s): CKTOT  No results for input(s): CHOL, TRIG, HDL, LDL in the last 72 hours. No results for input(s): SEDRATE, HSCRP in the last 72 hours. No results for input(s): APTT, INR in the last 72 hours. Invalid input(s): PT  Invalid input(s): HGBA1C  No results for input(s): PH, UUW6QMJ, PO2, BASE in the last 72 hours. Invalid input(s): NJS3QPY, SO2, INSPIREDO2  No results for input(s): ABO, RH in the last 72 hours. @LABALLVALUEIP(AMYLASE:5)@  No results for input(s): COLORU, PROTUR, UROBILINOGEN, OSMOLALITY, NAUR, KUR, CLUR, AMPHETAMINE, BENZOURQL, CANNABINOIDS, COCAINE, PCP in the last 72 hours. Invalid input(s): Mary Servant, MJ, GLUCUA, Mariola Paddy, BILIRUBIN, HGBUA, NITRITEU, LEUKOCTEST, WBCU, RBCU, MUCOUS, SPECGRAVUR, BARBSCRNUR, DOAUR  No results for input(s): TSH, T3FREE, FREET4 in the last 72 hours. Diagnostics:     Imaging:  No results found.     EKG:   No new    Micro:   Patient Infection Status     Infection Onset Added Last Indicated Last Indicated By Review Planned Expiration Resolved Resolved By    None active    Resolved    COVID-19 12/17/21 12/19/21 12/17/21 COVID-19   01/06/22 Alvin Lai RN    +12/17, admit 12/18 - 80%          Path:   N/A    Signed:  Shameka Palacios MD  Internal Medicine, PGY-1  4/2/2022  10:46 AM    Staff: Dr. Harsha Del Toro Bebeto Bundy MD

## 2022-04-02 NOTE — PLAN OF CARE
Problem: OXYGENATION/RESPIRATORY FUNCTION  Goal: Patient will maintain patent airway  Outcome: Ongoing     Problem: RESPIRATORY  Goal: Clear lung sounds  Description: Clear lung sounds  Outcome: Ongoing

## 2022-04-02 NOTE — PLAN OF CARE
Problem: Falls - Risk of:  Goal: Will remain free from falls  Description: Will remain free from falls  4/1/2022 2153 by Bonita Walker RN  Outcome: Met This Shift  4/1/2022 1100 by Christina Cheng RN  Outcome: Ongoing  Note: Pt remains free from falls. Standard fall precautions in place. Pt is independent upon set up and ambulates steadily without weakness. Will continue to monitor. Problem: Pain:  Goal: Pain level will decrease  Description: Pain level will decrease  4/1/2022 2153 by Bonita Walker RN  Outcome: Met This Shift  4/1/2022 1100 by Christina Cheng RN  Outcome: Ongoing  Note: Pt denies pain at this time. She states she does have chronic foot pain from her diabetic neuropathy, but does not have any new acute pain. Will continue to monitor. Problem: Skin Integrity:  Goal: Skin integrity will stabilize  Description: Skin integrity will stabilize  4/1/2022 2153 by Bonita Walker RN  Outcome: Met This Shift  4/1/2022 1100 by Christina Cheng RN  Outcome: Ongoing  Note: Skin integrity intact. Pt turns self frequently and repositions herself independently. Q2H turns with pillow support offered. Pt denies further questions or complaints at this time. Will continue to monitor. Problem: Skin Integrity:  Goal: Absence of new skin breakdown  Description: Absence of new skin breakdown  4/1/2022 2153 by Bonita Walker RN  Outcome: Met This Shift  4/1/2022 1100 by Christina Cheng RN  Outcome: Ongoing  Note: Skin integrity intact. Pt turns self frequently and repositions herself independently. Q2H turns with pillow support offered. Pt denies further questions or complaints at this time. Will continue to monitor. Problem: Daily Care:  Goal: Daily care needs are met  Description: Daily care needs are met  4/1/2022 1100 by Christina Cheng RN  Outcome: Ongoing  Note: Pt verbalizes he daily care needs are met. Pt denies further questions or complaints at this time.  Will continue to monitor. Problem: Discharge Planning:  Goal: Patients continuum of care needs are met  Description: Patients continuum of care needs are met  4/1/2022 1100 by Christina Cheng RN  Outcome: Ongoing  Note: Pt plans home at discharge. Home health may be needed. Social work and case management on the case. Will continue to monitor. Problem: OXYGENATION/RESPIRATORY FUNCTION  Goal: Patient will maintain patent airway  4/1/2022 1100 by Christina Cheng RN  Outcome: Ongoing  Note: Pt is maintaining patent airway. She is on HFNC for humidification and comfort. Pt is on the schedule for a tracheostomy at 1300. Breathing treatments administered per MAR. Will continue to monitor.       Problem: RESPIRATORY  Goal: Clear lung sounds  Description: Clear lung sounds  4/1/2022 0829 by Leeanna Calixto RCP  Outcome: Ongoing     Problem: Falls - Risk of:  Goal: Absence of physical injury  Description: Absence of physical injury  4/1/2022 1100 by Christina Cheng RN  Outcome: Completed     Problem: Pain:  Goal: Control of acute pain  Description: Control of acute pain  4/1/2022 1100 by Christina Cheng RN  Outcome: Completed  Goal: Control of chronic pain  Description: Control of chronic pain  4/1/2022 1100 by Christina Cheng RN  Outcome: Completed     Problem: OXYGENATION/RESPIRATORY FUNCTION  Goal: Patient will achieve/maintain normal respiratory rate/effort  Description: Respiratory rate and effort will be within normal limits for the patient  4/1/2022 1100 by Christina Cheng RN  Outcome: Completed     Problem: Skin Integrity:  Goal: Will show no infection signs and symptoms  Description: Will show no infection signs and symptoms  4/1/2022 1100 by Christina Cheng RN  Outcome: Completed     Problem: Discharge Planning:  Goal: Discharged to appropriate level of care  Description: Discharged to appropriate level of care  4/1/2022 0901 by KIARRA Escobedo  Outcome: Completed

## 2022-04-03 LAB
CREATININE, URINE: 48.3 MG/DL
GLUCOSE BLD-MCNC: 191 MG/DL (ref 70–108)
GLUCOSE BLD-MCNC: 217 MG/DL (ref 70–108)
GLUCOSE BLD-MCNC: 224 MG/DL (ref 70–108)
GLUCOSE BLD-MCNC: 269 MG/DL (ref 70–108)
GLUCOSE BLD-MCNC: 312 MG/DL (ref 70–108)
MICROALBUMIN UR-MCNC: < 1.2 MG/DL
MICROALBUMIN/CREAT UR-RTO: 25 MG/G (ref 0–30)

## 2022-04-03 PROCEDURE — 2060000000 HC ICU INTERMEDIATE R&B

## 2022-04-03 PROCEDURE — APPNB45 APP NON BILLABLE 31-45 MINUTES: Performed by: NURSE PRACTITIONER

## 2022-04-03 PROCEDURE — 6360000002 HC RX W HCPCS: Performed by: OTOLARYNGOLOGY

## 2022-04-03 PROCEDURE — 2580000003 HC RX 258: Performed by: STUDENT IN AN ORGANIZED HEALTH CARE EDUCATION/TRAINING PROGRAM

## 2022-04-03 PROCEDURE — 82948 REAGENT STRIP/BLOOD GLUCOSE: CPT

## 2022-04-03 PROCEDURE — 99232 SBSQ HOSP IP/OBS MODERATE 35: CPT | Performed by: INTERNAL MEDICINE

## 2022-04-03 PROCEDURE — 94640 AIRWAY INHALATION TREATMENT: CPT

## 2022-04-03 PROCEDURE — 94760 N-INVAS EAR/PLS OXIMETRY 1: CPT

## 2022-04-03 PROCEDURE — 6370000000 HC RX 637 (ALT 250 FOR IP): Performed by: INTERNAL MEDICINE

## 2022-04-03 PROCEDURE — 2700000000 HC OXYGEN THERAPY PER DAY

## 2022-04-03 PROCEDURE — 6360000002 HC RX W HCPCS: Performed by: NURSE PRACTITIONER

## 2022-04-03 PROCEDURE — 6370000000 HC RX 637 (ALT 250 FOR IP)

## 2022-04-03 PROCEDURE — 6370000000 HC RX 637 (ALT 250 FOR IP): Performed by: STUDENT IN AN ORGANIZED HEALTH CARE EDUCATION/TRAINING PROGRAM

## 2022-04-03 PROCEDURE — 82043 UR ALBUMIN QUANTITATIVE: CPT

## 2022-04-03 RX ORDER — GLIPIZIDE 5 MG/1
5 TABLET ORAL
Status: DISCONTINUED | OUTPATIENT
Start: 2022-04-04 | End: 2022-04-04

## 2022-04-03 RX ADMIN — INSULIN LISPRO 3 UNITS: 100 INJECTION, SOLUTION INTRAVENOUS; SUBCUTANEOUS at 08:20

## 2022-04-03 RX ADMIN — GUAIFENESIN 600 MG: 600 TABLET, EXTENDED RELEASE ORAL at 08:20

## 2022-04-03 RX ADMIN — INSULIN GLARGINE 40 UNITS: 100 INJECTION, SOLUTION SUBCUTANEOUS at 20:16

## 2022-04-03 RX ADMIN — METOPROLOL SUCCINATE 25 MG: 25 TABLET, FILM COATED, EXTENDED RELEASE ORAL at 08:20

## 2022-04-03 RX ADMIN — SODIUM CHLORIDE, PRESERVATIVE FREE 10 ML: 5 INJECTION INTRAVENOUS at 08:20

## 2022-04-03 RX ADMIN — FAMOTIDINE 20 MG: 20 TABLET ORAL at 08:20

## 2022-04-03 RX ADMIN — BUDESONIDE 500 MCG: 0.5 INHALANT RESPIRATORY (INHALATION) at 17:13

## 2022-04-03 RX ADMIN — BUSPIRONE HYDROCHLORIDE 10 MG: 10 TABLET ORAL at 08:20

## 2022-04-03 RX ADMIN — SODIUM CHLORIDE, PRESERVATIVE FREE 10 ML: 5 INJECTION INTRAVENOUS at 20:14

## 2022-04-03 RX ADMIN — DEXAMETHASONE SODIUM PHOSPHATE 6 MG: 4 INJECTION, SOLUTION INTRA-ARTICULAR; INTRALESIONAL; INTRAMUSCULAR; INTRAVENOUS; SOFT TISSUE at 03:26

## 2022-04-03 RX ADMIN — INSULIN LISPRO 5 UNITS: 100 INJECTION, SOLUTION INTRAVENOUS; SUBCUTANEOUS at 08:21

## 2022-04-03 RX ADMIN — BUDESONIDE 500 MCG: 0.5 INHALANT RESPIRATORY (INHALATION) at 07:26

## 2022-04-03 RX ADMIN — GUAIFENESIN 600 MG: 600 TABLET, EXTENDED RELEASE ORAL at 20:14

## 2022-04-03 RX ADMIN — INSULIN LISPRO 5 UNITS: 100 INJECTION, SOLUTION INTRAVENOUS; SUBCUTANEOUS at 17:01

## 2022-04-03 RX ADMIN — BUSPIRONE HYDROCHLORIDE 10 MG: 10 TABLET ORAL at 20:14

## 2022-04-03 RX ADMIN — BUSPIRONE HYDROCHLORIDE 10 MG: 10 TABLET ORAL at 17:02

## 2022-04-03 RX ADMIN — CALCIUM POLYCARBOPHIL 625 MG: 625 TABLET, FILM COATED ORAL at 08:20

## 2022-04-03 RX ADMIN — Medication 6 MG: at 20:14

## 2022-04-03 RX ADMIN — HYDROXYZINE PAMOATE 25 MG: 25 CAPSULE ORAL at 15:24

## 2022-04-03 RX ADMIN — DEXAMETHASONE SODIUM PHOSPHATE 6 MG: 4 INJECTION, SOLUTION INTRA-ARTICULAR; INTRALESIONAL; INTRAMUSCULAR; INTRAVENOUS; SOFT TISSUE at 17:06

## 2022-04-03 RX ADMIN — ROSUVASTATIN CALCIUM 10 MG: 10 TABLET, FILM COATED ORAL at 20:14

## 2022-04-03 RX ADMIN — INSULIN LISPRO 9 UNITS: 100 INJECTION, SOLUTION INTRAVENOUS; SUBCUTANEOUS at 17:01

## 2022-04-03 RX ADMIN — INSULIN LISPRO 4 UNITS: 100 INJECTION, SOLUTION INTRAVENOUS; SUBCUTANEOUS at 20:15

## 2022-04-03 RX ADMIN — INSULIN LISPRO 6 UNITS: 100 INJECTION, SOLUTION INTRAVENOUS; SUBCUTANEOUS at 12:41

## 2022-04-03 RX ADMIN — HYDROXYZINE PAMOATE 25 MG: 25 CAPSULE ORAL at 06:37

## 2022-04-03 RX ADMIN — FAMOTIDINE 20 MG: 20 TABLET ORAL at 20:14

## 2022-04-03 RX ADMIN — INSULIN LISPRO 5 UNITS: 100 INJECTION, SOLUTION INTRAVENOUS; SUBCUTANEOUS at 12:42

## 2022-04-03 ASSESSMENT — PAIN SCALES - GENERAL
PAINLEVEL_OUTOF10: 0

## 2022-04-03 NOTE — PROGRESS NOTES
Assessment and Plan:        1. Glottic stenosis- s/p tracheostomy; stable. HOW LONG TO STAY ON DECADRON? 2. DM- A1C 9; likely her glipizide not sufficient. Teach insulin. 3. Hx of stent- will need to restart antiplatelet at some point      CC:  dyspnea  HPI:  Pt with dm, cad, covid, developed tracheal complications related to intubation. Had trach. To relieve stridor. Currently stable. ROS (12 point review of systems completed. Pertinent positives noted.  Otherwise ROS is negative) :     PMH:  Per HPI  SHX:    FHX: Noncontributory'  Allergies: See above    Medications:     sodium chloride      dextrose        [START ON 4/4/2022] glipiZIDE  5 mg Oral QAM AC    insulin glargine  40 Units SubCUTAneous Nightly    insulin lispro  0-18 Units SubCUTAneous TID WC    [Held by provider] insulin lispro  0-9 Units SubCUTAneous Nightly    insulin lispro  0-6 Units SubCUTAneous Nightly    dexamethasone  6 mg IntraVENous Q12H    insulin lispro  5 Units SubCUTAneous TID AC    busPIRone  10 mg Oral TID    budesonide  0.5 mg Nebulization BID    sodium chloride flush  5-40 mL IntraVENous 2 times per day    [Held by provider] aspirin  81 mg Oral Daily    famotidine  20 mg Oral BID    [Held by provider] furosemide  40 mg Oral Daily    melatonin  6 mg Oral Nightly    polycarbophil  625 mg Oral Daily    rosuvastatin  10 mg Oral Nightly    guaiFENesin  600 mg Oral BID    metoprolol succinate  25 mg Oral Daily       Vital Signs:   BP (!) 152/80   Pulse 67   Temp 98 °F (36.7 °C) (Oral)   Resp 18   Ht 5' 2\" (1.575 m)   Wt 194 lb 1.6 oz (88 kg)   LMP  (LMP Unknown)   SpO2 99%   BMI 35.50 kg/m²      Intake/Output Summary (Last 24 hours) at 4/3/2022 1028  Last data filed at 4/2/2022 2045  Gross per 24 hour   Intake 533.26 ml   Output 2400 ml   Net -1866.74 ml        Physical Examination: General appearance - chronically ill appearing  Mental status - alert, oriented to person, place, and time  Neck - supple, no significant adenopathy, no JVD, or carotid bruits  Chest - clear to auscultation, no wheezes, rales or rhonchi, symmetric air entry  Heart - normal rate, regular rhythm, normal S1, S2, no murmurs, rubs, clicks or gallops  Abdomen - soft, nontender, nondistended, no masses or organomegaly  Neurological - alert, oriented, normal speech, no focal findings or movement disorder noted  Musculoskeletal - no joint tenderness, deformity or swelling  Extremities - peripheral pulses normal, no pedal edema, no clubbing or cyanosis  Skin - normal coloration and turgor, no rashes, no suspicious skin lesions noted    Data: (All radiographs, tracings, PFTs, and imaging are personally viewed and interpreted unless otherwise noted).           Electronically signed by Jair Ortiz MD on 4/3/2022 at 10:28 AM

## 2022-04-03 NOTE — PLAN OF CARE
Problem: OXYGENATION/RESPIRATORY FUNCTION  Goal: Patient will maintain patent airway  4/3/2022 0735 by Sandi Zarco RCP  Outcome: Ongoing  4/2/2022 2054 by Shree Skelton RN  Outcome: Met This Shift     Problem: RESPIRATORY  Goal: Clear lung sounds  Description: Clear lung sounds  Outcome: Ongoing

## 2022-04-03 NOTE — PLAN OF CARE
Problem: Falls - Risk of:  Goal: Will remain free from falls  Description: Will remain free from falls  4/2/2022 2054 by Miguelina Fischer RN  Outcome: Met This Shift  4/2/2022 1442 by Stacey Correa RN  Outcome: Ongoing  Note: Patient remains free of falls this shift. Alert and oriented, uses call light appropriately. Patient up ad annie and encouraged to ambulate throughout room. Problem: Daily Care:  Goal: Daily care needs are met  Description: Daily care needs are met  4/2/2022 2054 by Miguelina Fischer RN  Outcome: Met This Shift  4/2/2022 1442 by Stacey Correa RN  Outcome: Ongoing  Note: Patient identifies needs as met throughout shift. Problem: Pain:  Goal: Pain level will decrease  Description: Pain level will decrease  4/2/2022 2054 by Miguelina Fischer RN  Outcome: Met This Shift  4/2/2022 1442 by Stacey Correa RN  Outcome: Ongoing  Note: Patient denies any pain this shift. PRN pain medication available as needed. Educated patient on non-pharmalogical interventions to assist with pain control. Problem: Skin Integrity:  Goal: Skin integrity will stabilize  Description: Skin integrity will stabilize  4/2/2022 2054 by Miguelina Fischer RN  Outcome: Met This Shift  4/2/2022 1442 by Stacey Correa RN  Outcome: Ongoing  Note: Patient has new tracheostomy placement with red tinged oozing secretions. Problem: OXYGENATION/RESPIRATORY FUNCTION  Goal: Patient will maintain patent airway  4/2/2022 2054 by Miguelina Fischer RN  Outcome: Met This Shift  4/2/2022 1442 by Stacey Correa RN  Outcome: Ongoing  Note: Patient has patent tracheostomy in place. Tracheostomy has moderate, red tinged oozing secretions.    4/2/2022 1309 by Deep Jean RCP  Outcome: Ongoing     Problem: Discharge Planning:  Goal: Patients continuum of care needs are met  Description: Patients continuum of care needs are met  4/2/2022 1442 by Stacey Correa RN  Outcome: Ongoing  Note: Patient plans to discharge home with spouse. Patient will need thorough education prior to discharge regarding new tracheostomy placement. Problem: Skin Integrity:  Goal: Absence of new skin breakdown  Description: Absence of new skin breakdown  4/2/2022 1442 by Immanuel Thorne RN  Outcome: Ongoing  Note: No new skin breakdown this shift. Patient up ad annie and encouraged to ambulate throughout shift.       Problem: RESPIRATORY  Goal: Clear lung sounds  Description: Clear lung sounds  4/2/2022 1309 by Brionna Stein RCP  Outcome: Ongoing

## 2022-04-03 NOTE — PROGRESS NOTES
Department of Otolaryngology  Progress Note    Chief Complaint:  POD 2 s/p tracheostomy placement with Dr Bjorn Alonso secondary to glottic stenosis    SUBJECTIVE:  No acute events overnight. Patient reports feeling significantly better today. She feels the Vistaril has helped calm her a lot. She is allowing suctioning without any discomfort. Less secretions today. On trach mask 28%. She denies any pain. A complete multi-organ review of systems was performed using a new patient questionnaire, and reviewed by me. ENT:  negative except as noted in HPI  CONSTITUTIONAL:  negative except as noted in HPI  EYES:  negative except as noted in HPI  RESPIRATORY:  negative except as noted in HPI  CARDIOVASCULAR:  negative except as noted in HPI  GASTROINTESTINAL:  negative except as noted in HPI  GENITOURINARY:  negative except as noted in HPI  MUSCULOSKELETAL:  negative except as noted in HPI  SKIN:  negative except as noted in HPI  ENDOCRINE/METABOLIC: negative except as noted in HPI  HEMATOLOGIC/LYMPHATIC:  negative except as noted in HPI  ALLERGY/IMMUN: negative except as noted in HPI  NEUROLOGICAL:  negative except as noted in HPI  BEHAVIOR/PSYCH:  negative except as noted in HPI    OBJECTIVE      Physical  VITALS:  BP (!) 163/93   Pulse 67   Temp 97.6 °F (36.4 °C) (Oral)   Resp 18   Ht 5' 2\" (1.575 m)   Wt 194 lb 1.6 oz (88 kg)   LMP  (LMP Unknown)   SpO2 99%   BMI 35.50 kg/m²     Sitting in bedside chair eating breakfast without difficulty. Alert, oriented and cooperative. Mood is much improved today. Currently aphonic; communicast Shiley #6 cuffed tracheostomy tube in place; flange sutures intact; trach ties secure. Less secretions today. On trach mask. No distress.      Data  CBC with Differential:    Lab Results   Component Value Date    WBC 24.2 04/02/2022    RBC 4.73 04/02/2022    HGB 13.6 04/02/2022    HCT 43.0 04/02/2022     04/02/2022    MCV 90.9 04/02/2022    MCH 28.8 04/02/2022    [facility] 31.6 04/02/2022    NRBC 0 03/27/2022    SEGSPCT 65.9 03/27/2022    MONOPCT 7.6 03/27/2022    MONOSABS 1.1 03/27/2022    LYMPHSABS 3.2 03/27/2022    EOSABS 0.1 03/27/2022    BASOSABS 0.1 03/27/2022     BMP:    Lab Results   Component Value Date     04/02/2022    K 5.2 04/02/2022    K 5.0 03/23/2022     04/02/2022    CO2 29 04/02/2022    BUN 26 04/02/2022    LABALBU 4.3 03/18/2022    CREATININE 0.6 04/02/2022    CALCIUM 9.7 04/02/2022    LABGLOM >90 04/02/2022    GLUCOSE 96 04/02/2022       Inpatient Medications  Current Facility-Administered Medications: insulin glargine (LANTUS) injection vial 40 Units, 40 Units, SubCUTAneous, Nightly  ipratropium-albuterol (DUONEB) nebulizer solution 1 ampule, 1 ampule, Inhalation, Q4H PRN  insulin lispro (HUMALOG) injection vial 0-18 Units, 0-18 Units, SubCUTAneous, TID WC  [Held by provider] insulin lispro (HUMALOG) injection vial 0-9 Units, 0-9 Units, SubCUTAneous, Nightly  insulin lispro (HUMALOG) injection vial 0-6 Units, 0-6 Units, SubCUTAneous, Nightly  dexamethasone (DECADRON) injection 6 mg, 6 mg, IntraVENous, Q12H  insulin lispro (HUMALOG) injection vial 5 Units, 5 Units, SubCUTAneous, TID AC  busPIRone (BUSPAR) tablet 10 mg, 10 mg, Oral, TID  budesonide (PULMICORT) nebulizer suspension 500 mcg, 0.5 mg, Nebulization, BID  sodium chloride flush 0.9 % injection 5-40 mL, 5-40 mL, IntraVENous, 2 times per day  sodium chloride flush 0.9 % injection 5-40 mL, 5-40 mL, IntraVENous, PRN  0.9 % sodium chloride infusion, 25 mL, IntraVENous, PRN  ondansetron (ZOFRAN-ODT) disintegrating tablet 4 mg, 4 mg, Oral, Q8H PRN **OR** ondansetron (ZOFRAN) injection 4 mg, 4 mg, IntraVENous, Q6H PRN  polyethylene glycol (GLYCOLAX) packet 17 g, 17 g, Oral, Daily PRN  acetaminophen (TYLENOL) tablet 650 mg, 650 mg, Oral, Q6H PRN **OR** acetaminophen (TYLENOL) suppository 650 mg, 650 mg, Rectal, Q6H PRN  racepinephrine HCl (VAPONEFPRIN) 2.25 % nebulizer solution NEBU 11.25 mg, 11.25 mg, Nebulization, Q4H PRN  sodium chloride nebulizer 0.9 % solution 3 mL, 3 mL, Nebulization, Q4H PRN  [Held by provider] aspirin chewable tablet 81 mg, 81 mg, Oral, Daily  diclofenac sodium (VOLTAREN) 1 % gel 2 g, 2 g, Topical, 4x Daily PRN  famotidine (PEPCID) tablet 20 mg, 20 mg, Oral, BID  [Held by provider] furosemide (LASIX) tablet 40 mg, 40 mg, Oral, Daily  hydrOXYzine (VISTARIL) capsule 25 mg, 25 mg, Oral, 4x Daily PRN  melatonin tablet 6 mg, 6 mg, Oral, Nightly  nitroGLYCERIN (NITROSTAT) SL tablet 0.4 mg, 0.4 mg, SubLINGual, Q5 Min PRN  polycarbophil (FIBERCON) tablet 625 mg, 625 mg, Oral, Daily  rosuvastatin (CRESTOR) tablet 10 mg, 10 mg, Oral, Nightly  senna (SENOKOT) tablet 8.6 mg, 1 tablet, Oral, BID PRN  guaiFENesin (MUCINEX) extended release tablet 600 mg, 600 mg, Oral, BID  metoprolol succinate (TOPROL XL) extended release tablet 25 mg, 25 mg, Oral, Daily  glucagon (rDNA) injection 1 mg, 1 mg, IntraMUSCular, PRN  dextrose 5 % solution, 100 mL/hr, IntraVENous, PRN  glucose chewable tablet 4 each, 4 tablet, Oral, PRN  dextrose bolus (hypoglycemia) 10% 125 mL, 125 mL, IntraVENous, PRN **OR** dextrose bolus (hypoglycemia) 10% 250 mL, 250 mL, IntraVENous, PRN    ASSESSMENT AND PLAN    Glottic stenosis s/p tracheostomy placement 4/1/2022 with Dr Sissy Baez  - Humidification per trach mask  - Tracheostomy care per protocol- okay to change soiled ties and drain sponge, clean around stoma  - Continue with tracheostomy teaching and encourage self care  - Plan change to uncuffed tube this week; possibly in OR if we can work out a time to widen the larynx as well.    - Discharge planning with supplies and home health  - Okay to ambulate in silver  - Medical management per primary team    Electronically signed by WYATT Biswas - CNP on 4/3/2022 at 8:00 AM

## 2022-04-03 NOTE — PLAN OF CARE
Problem: Daily Care:  Goal: Daily care needs are met  Description: Daily care needs are met  4/3/2022 1548 by Danielito Horvath RN  Outcome: Met This Shift  Note: Patient identifies needs as met throughout the shift. Problem: Falls - Risk of:  Goal: Will remain free from falls  Description: Will remain free from falls  4/3/2022 1548 by Danielito Horvath RN  Outcome: Ongoing  Note: Patient remains free of falls this shift. Alert and oriented, uses call light appropriately, bed and chair alarm in place for safety measures. Gripper socks applied. Problem: Pain:  Description: Pain management should include both nonpharmacologic and pharmacologic interventions. Goal: Pain level will decrease  Description: Pain level will decrease  4/3/2022 1548 by Danielito Horvath RN  Outcome: Ongoing  Note: Pain Assessment: 0-10  Pain Level: 0   Patient's Stated Pain Goal: No pain   Is pain goal met at this time? Yes     Non-Pharmaceutical Pain Intervention(s): Repositioned,Rest (prn tylenol)      Problem: Skin Integrity:  Goal: Skin integrity will stabilize  Description: Skin integrity will stabilize  4/3/2022 1548 by Danielito Horvath RN  Outcome: Ongoing  Note: Patient does not have skin integrity complaints/issues this shift. Patient ambulates throughout room. Encouraged patient to ambulate in halls to increase activity tolerance. Problem: Discharge Planning:  Goal: Patients continuum of care needs are met  Description: Patients continuum of care needs are met  4/3/2022 1548 by Danielito Horvath RN  Outcome: Ongoing  Note: Social work and case management collaborating with patient and care team to identify discharge needs and barriers. Patient plans home with spouse at discharge. Problem: OXYGENATION/RESPIRATORY FUNCTION  Goal: Patient will maintain patent airway  4/3/2022 1548 by Danielito Horvath RN  Outcome: Ongoing  Note: Oxygen saturations greater than 90% throughout shift.  Receiving respiratory treatments to assist to airway management. PRN suctioning of tracheostomy provided. Problem: Skin Integrity:  Goal: Absence of new skin breakdown  Description: Absence of new skin breakdown  4/3/2022 1548 by Edvin Kent, RN  Outcome: Ongoing  Note: No new skin breakdown this shift. Patient independently moves throughout room with minimal assistance. Zinc cream applied to buttocks for skin breakdown prevention. Care plan reviewed with patient and family. Patient and family verbalize understanding of the plan of care and contribute to goal setting.

## 2022-04-04 ENCOUNTER — TELEPHONE (OUTPATIENT)
Dept: ENT CLINIC | Age: 72
End: 2022-04-04

## 2022-04-04 LAB
GLUCOSE BLD-MCNC: 163 MG/DL (ref 70–108)
GLUCOSE BLD-MCNC: 164 MG/DL (ref 70–108)
GLUCOSE BLD-MCNC: 171 MG/DL (ref 70–108)
GLUCOSE BLD-MCNC: 267 MG/DL (ref 70–108)

## 2022-04-04 PROCEDURE — 6370000000 HC RX 637 (ALT 250 FOR IP): Performed by: INTERNAL MEDICINE

## 2022-04-04 PROCEDURE — APPNB45 APP NON BILLABLE 31-45 MINUTES: Performed by: NURSE PRACTITIONER

## 2022-04-04 PROCEDURE — 82948 REAGENT STRIP/BLOOD GLUCOSE: CPT

## 2022-04-04 PROCEDURE — 99232 SBSQ HOSP IP/OBS MODERATE 35: CPT | Performed by: INTERNAL MEDICINE

## 2022-04-04 PROCEDURE — 6360000002 HC RX W HCPCS: Performed by: NURSE PRACTITIONER

## 2022-04-04 PROCEDURE — 94760 N-INVAS EAR/PLS OXIMETRY 1: CPT

## 2022-04-04 PROCEDURE — 2700000000 HC OXYGEN THERAPY PER DAY

## 2022-04-04 PROCEDURE — 6370000000 HC RX 637 (ALT 250 FOR IP): Performed by: STUDENT IN AN ORGANIZED HEALTH CARE EDUCATION/TRAINING PROGRAM

## 2022-04-04 PROCEDURE — 89220 SPUTUM SPECIMEN COLLECTION: CPT

## 2022-04-04 PROCEDURE — 97116 GAIT TRAINING THERAPY: CPT

## 2022-04-04 PROCEDURE — 2060000000 HC ICU INTERMEDIATE R&B

## 2022-04-04 PROCEDURE — 97530 THERAPEUTIC ACTIVITIES: CPT

## 2022-04-04 PROCEDURE — 94640 AIRWAY INHALATION TREATMENT: CPT

## 2022-04-04 PROCEDURE — 97110 THERAPEUTIC EXERCISES: CPT

## 2022-04-04 PROCEDURE — 6360000002 HC RX W HCPCS: Performed by: OTOLARYNGOLOGY

## 2022-04-04 PROCEDURE — 6370000000 HC RX 637 (ALT 250 FOR IP)

## 2022-04-04 PROCEDURE — 97535 SELF CARE MNGMENT TRAINING: CPT

## 2022-04-04 PROCEDURE — 2580000003 HC RX 258: Performed by: STUDENT IN AN ORGANIZED HEALTH CARE EDUCATION/TRAINING PROGRAM

## 2022-04-04 RX ORDER — INSULIN GLARGINE 100 [IU]/ML
50 INJECTION, SOLUTION SUBCUTANEOUS NIGHTLY
Status: DISCONTINUED | OUTPATIENT
Start: 2022-04-04 | End: 2022-04-04

## 2022-04-04 RX ORDER — INSULIN GLARGINE 100 [IU]/ML
10 INJECTION, SOLUTION SUBCUTANEOUS ONCE
Status: DISCONTINUED | OUTPATIENT
Start: 2022-04-04 | End: 2022-04-04

## 2022-04-04 RX ORDER — GLIPIZIDE 5 MG/1
5 TABLET ORAL
Status: DISCONTINUED | OUTPATIENT
Start: 2022-04-04 | End: 2022-04-08 | Stop reason: HOSPADM

## 2022-04-04 RX ORDER — INSULIN GLARGINE 100 [IU]/ML
40 INJECTION, SOLUTION SUBCUTANEOUS NIGHTLY
Status: DISCONTINUED | OUTPATIENT
Start: 2022-04-04 | End: 2022-04-05

## 2022-04-04 RX ADMIN — INSULIN LISPRO 3 UNITS: 100 INJECTION, SOLUTION INTRAVENOUS; SUBCUTANEOUS at 11:51

## 2022-04-04 RX ADMIN — INSULIN LISPRO 5 UNITS: 100 INJECTION, SOLUTION INTRAVENOUS; SUBCUTANEOUS at 08:00

## 2022-04-04 RX ADMIN — METOPROLOL SUCCINATE 25 MG: 25 TABLET, FILM COATED, EXTENDED RELEASE ORAL at 08:11

## 2022-04-04 RX ADMIN — BUDESONIDE 500 MCG: 0.5 INHALANT RESPIRATORY (INHALATION) at 07:31

## 2022-04-04 RX ADMIN — GUAIFENESIN 600 MG: 600 TABLET, EXTENDED RELEASE ORAL at 08:11

## 2022-04-04 RX ADMIN — BUSPIRONE HYDROCHLORIDE 10 MG: 10 TABLET ORAL at 20:49

## 2022-04-04 RX ADMIN — INSULIN LISPRO 10 UNITS: 100 INJECTION, SOLUTION INTRAVENOUS; SUBCUTANEOUS at 16:58

## 2022-04-04 RX ADMIN — SODIUM CHLORIDE, PRESERVATIVE FREE 10 ML: 5 INJECTION INTRAVENOUS at 08:12

## 2022-04-04 RX ADMIN — ROSUVASTATIN CALCIUM 10 MG: 10 TABLET, FILM COATED ORAL at 20:48

## 2022-04-04 RX ADMIN — BUDESONIDE 500 MCG: 0.5 INHALANT RESPIRATORY (INHALATION) at 17:34

## 2022-04-04 RX ADMIN — BUSPIRONE HYDROCHLORIDE 10 MG: 10 TABLET ORAL at 14:50

## 2022-04-04 RX ADMIN — Medication 6 MG: at 21:52

## 2022-04-04 RX ADMIN — INSULIN LISPRO 1 UNITS: 100 INJECTION, SOLUTION INTRAVENOUS; SUBCUTANEOUS at 20:52

## 2022-04-04 RX ADMIN — GLIPIZIDE 5 MG: 5 TABLET ORAL at 08:11

## 2022-04-04 RX ADMIN — INSULIN LISPRO 10 UNITS: 100 INJECTION, SOLUTION INTRAVENOUS; SUBCUTANEOUS at 11:55

## 2022-04-04 RX ADMIN — INSULIN LISPRO 9 UNITS: 100 INJECTION, SOLUTION INTRAVENOUS; SUBCUTANEOUS at 08:11

## 2022-04-04 RX ADMIN — SODIUM CHLORIDE, PRESERVATIVE FREE 10 ML: 5 INJECTION INTRAVENOUS at 20:49

## 2022-04-04 RX ADMIN — GUAIFENESIN 600 MG: 600 TABLET, EXTENDED RELEASE ORAL at 20:48

## 2022-04-04 RX ADMIN — FAMOTIDINE 20 MG: 20 TABLET ORAL at 20:54

## 2022-04-04 RX ADMIN — INSULIN LISPRO 3 UNITS: 100 INJECTION, SOLUTION INTRAVENOUS; SUBCUTANEOUS at 16:58

## 2022-04-04 RX ADMIN — GLIPIZIDE 5 MG: 5 TABLET ORAL at 16:58

## 2022-04-04 RX ADMIN — BUSPIRONE HYDROCHLORIDE 10 MG: 10 TABLET ORAL at 08:12

## 2022-04-04 RX ADMIN — INSULIN GLARGINE 40 UNITS: 100 INJECTION, SOLUTION SUBCUTANEOUS at 20:53

## 2022-04-04 RX ADMIN — CALCIUM POLYCARBOPHIL 625 MG: 625 TABLET, FILM COATED ORAL at 08:11

## 2022-04-04 RX ADMIN — FAMOTIDINE 20 MG: 20 TABLET ORAL at 08:13

## 2022-04-04 RX ADMIN — HYDROXYZINE PAMOATE 25 MG: 25 CAPSULE ORAL at 02:18

## 2022-04-04 RX ADMIN — DEXAMETHASONE SODIUM PHOSPHATE 6 MG: 4 INJECTION, SOLUTION INTRA-ARTICULAR; INTRALESIONAL; INTRAMUSCULAR; INTRAVENOUS; SOFT TISSUE at 02:15

## 2022-04-04 ASSESSMENT — PAIN SCALES - GENERAL
PAINLEVEL_OUTOF10: 0

## 2022-04-04 NOTE — PLAN OF CARE
Problem: Falls - Risk of:  Goal: Will remain free from falls  Description: Will remain free from falls  4/4/2022 1043 by Padmaja Ayon RN  Outcome: Ongoing  Note: Remained free from falls. Call light and belongings within reach. Bed/Chair alarm on. Problem: Daily Care:  Goal: Daily care needs are met  Description: Daily care needs are met  4/4/2022 1043 by Padmaja Ayon RN  Outcome: Ongoing  Note: Patients needs are met throughout shift. Will continue to monitor. Problem: Pain:  Goal: Pain level will decrease  Description: Pain level will decrease  4/4/2022 1043 by Padmaja Ayon RN  Outcome: Ongoing  Note: Pain Assessment: 0-10  Pain Level: 0   Patient's Stated Pain Goal: No pain   Is pain goal met at this time? Yes     Non-Pharmaceutical Pain Intervention(s): Repositioned. Problem: Skin Integrity:  Goal: Skin integrity will stabilize  Description: Skin integrity will stabilize  4/4/2022 1043 by Padmaja Ayon RN  Outcome: Ongoing  Note: Skin integrity in take, patient turns self every two hours, ambulates in room. Problem: Discharge Planning:  Goal: Patients continuum of care needs are met  Description: Patients continuum of care needs are met  Outcome: Ongoing  Note: Plans home at discharge. Social work and case management on case. Will continue to monitor. Problem: OXYGENATION/RESPIRATORY FUNCTION  Goal: Patient will maintain patent airway  4/4/2022 1043 by Padmaja Ayon RN  Outcome: Ongoing  Note: O2 Saturation is >90% this shift. Problem: Skin Integrity:  Goal: Absence of new skin breakdown  Description: Absence of new skin breakdown  4/4/2022 1043 by Padmaja Ayon RN  Outcome: Ongoing  Note: No new skin break down, patient turns every two hours. Care plan reviewed with patient. Patient verbalized understanding of the plan of care and contribute to goal setting.

## 2022-04-04 NOTE — RT PROTOCOL NOTE
RT Inhaler-Nebulizer Bronchodilator Protocol Note    There is a bronchodilator order in the chart from a provider indicating to follow the RT Bronchodilator Protocol and there is an Initiate RT Inhaler-Nebulizer Bronchodilator Protocol order as well (see protocol at bottom of note). CXR Findings:  No results found. The findings from the last RT Protocol Assessment were as follows:   History Pulmonary Disease: None or smoker <15 pack years  Respiratory Pattern: Regular pattern and RR 12-20 bpm  Breath Sounds: Slightly diminished and/or crackles  Cough: Strong, productive  Indication for Bronchodilator Therapy: Decreased or absent breath sounds  Bronchodilator Assessment Score: 3    Aerosolized bronchodilator medication orders have been revised according to the RT Inhaler-Nebulizer Bronchodilator Protocol below. Respiratory Therapist to perform RT Therapy Protocol Assessment initially then follow the protocol. Repeat RT Therapy Protocol Assessment PRN for score 0-3 or on second treatment, BID, and PRN for scores above 3. No Indications - adjust the frequency to every 6 hours PRN wheezing or bronchospasm, if no treatments needed after 48 hours then discontinue using Per Protocol order mode. If indication present, adjust the RT bronchodilator orders based on the Bronchodilator Assessment Score as indicated below. Use Inhaler orders unless patient has one or more of the following: on home nebulizer, not able to hold breath for 10 seconds, is not alert and oriented, cannot activate and use MDI correctly, or respiratory rate 25 breaths per minute or more, then use the equivalent nebulizer order(s) with same Frequency and PRN reasons based on the score. If a patient is on this medication at home then do not decrease Frequency below that used at home.     0-3 - enter or revise RT bronchodilator order(s) to equivalent RT Bronchodilator order with Frequency of every 4 hours PRN for wheezing or increased work of breathing using Per Protocol order mode. 4-6 - enter or revise RT Bronchodilator order(s) to two equivalent RT bronchodilator orders with one order with BID Frequency and one order with Frequency of every 4 hours PRN wheezing or increased work of breathing using Per Protocol order mode. 7-10 - enter or revise RT Bronchodilator order(s) to two equivalent RT bronchodilator orders with one order with TID Frequency and one order with Frequency of every 4 hours PRN wheezing or increased work of breathing using Per Protocol order mode. 11-13 - enter or revise RT Bronchodilator order(s) to one equivalent RT bronchodilator order with QID Frequency and an Albuterol order with Frequency of every 4 hours PRN wheezing or increased work of breathing using Per Protocol order mode. Greater than 13 - enter or revise RT Bronchodilator order(s) to one equivalent RT bronchodilator order with every 4 hours Frequency and an Albuterol order with Frequency of every 2 hours PRN wheezing or increased work of breathing using Per Protocol order mode. RT to enter RT Home Evaluation for COPD & MDI Assessment order using Per Protocol order mode.     Electronically signed by Mojgan Dotson RCP on 4/4/2022 at 9:25 AM

## 2022-04-04 NOTE — PLAN OF CARE
Problem: Falls - Risk of:  Goal: Will remain free from falls  Description: Will remain free from falls  4/3/2022 2224 by Aga Paulson RN  Outcome: Met This Shift  4/3/2022 1548 by Marzena Gerber RN  Outcome: Ongoing  Note: Patient remains free of falls this shift. Alert and oriented, uses call light appropriately, bed and chair alarm in place for safety measures. Gripper socks applied. 4/3/2022 1547 by Marzena Gerber RN  Outcome: Ongoing     Problem: Daily Care:  Goal: Daily care needs are met  Description: Daily care needs are met  4/3/2022 2224 by Aga Paulson RN  Outcome: Met This Shift  4/3/2022 1548 by Marzena Gerber RN  Outcome: Met This Shift  Note: Patient identifies needs as met throughout the shift. 4/3/2022 1547 by Marzena Gerber RN  Outcome: Ongoing     Problem: Pain:  Goal: Pain level will decrease  Description: Pain level will decrease  4/3/2022 2224 by Aga Paulson RN  Outcome: Met This Shift  4/3/2022 1548 by Marzena Gerber RN  Outcome: Ongoing  Note: Pain Assessment: 0-10  Pain Level: 0   Patient's Stated Pain Goal: No pain   Is pain goal met at this time? Yes     Non-Pharmaceutical Pain Intervention(s): Repositioned,Rest (prn tylenol)   4/3/2022 1547 by Marzena Gerber RN  Outcome: Ongoing     Problem: Skin Integrity:  Goal: Skin integrity will stabilize  Description: Skin integrity will stabilize  4/3/2022 2224 by Aga Paulson RN  Outcome: Met This Shift  4/3/2022 1548 by Marzena Gerber RN  Outcome: Ongoing  Note: Patient does not have skin integrity complaints/issues this shift. Patient ambulates throughout room. Encouraged patient to ambulate in halls to increase activity tolerance.   4/3/2022 1547 by Marzena Gerber RN  Outcome: Ongoing     Problem: OXYGENATION/RESPIRATORY FUNCTION  Goal: Patient will maintain patent airway  4/3/2022 2224 by Aga Paulson RN  Outcome: Met This Shift  4/3/2022 1548 by Gisela Maxwell Ramin Danielle RN  Outcome: Ongoing  Note: Oxygen saturations greater than 90% throughout shift. Receiving respiratory treatments to assist to airway management. PRN suctioning of tracheostomy provided. 4/3/2022 1547 by Walter Nolasco RN  Outcome: Ongoing     Problem: Skin Integrity:  Goal: Absence of new skin breakdown  Description: Absence of new skin breakdown  4/3/2022 2224 by Yenifer Frank RN  Outcome: Met This Shift  4/3/2022 1548 by Walter Nolasco RN  Outcome: Ongoing  Note: No new skin breakdown this shift. Patient independently moves throughout room with minimal assistance. Zinc cream applied to buttocks for skin breakdown prevention. 4/3/2022 1547 by Walter Nolasco RN  Outcome: Ongoing     Problem: Discharge Planning:  Goal: Patients continuum of care needs are met  Description: Patients continuum of care needs are met  4/3/2022 1548 by Walter Nolasco RN  Outcome: Ongoing  Note: Social work and case management collaborating with patient and care team to identify discharge needs and barriers. Patient plans home with spouse at discharge.     4/3/2022 1547 by Walter Nolasco RN  Outcome: Ongoing

## 2022-04-04 NOTE — PROGRESS NOTES
Department of Otolaryngology  Progress Note    Chief Complaint:  POD 3 s/p tracheostomy placement with Dr Elvia Box secondary to glottic stenosis    SUBJECTIVE:  No acute events overnight. Patient reports that she feels well and that the addition of Vistaril has been very helpful. She is tolerating suctioning per tracheostomy; secretions have slowed. On trach mask 28%. She denies any pain. A complete multi-organ review of systems was performed using a new patient questionnaire, and reviewed by me. ENT:  negative except as noted in HPI  CONSTITUTIONAL:  negative except as noted in HPI  EYES:  negative except as noted in HPI  RESPIRATORY:  negative except as noted in HPI  CARDIOVASCULAR:  negative except as noted in HPI  GASTROINTESTINAL:  negative except as noted in HPI  GENITOURINARY:  negative except as noted in HPI  MUSCULOSKELETAL:  negative except as noted in HPI  SKIN:  negative except as noted in HPI  ENDOCRINE/METABOLIC: negative except as noted in HPI  HEMATOLOGIC/LYMPHATIC:  negative except as noted in HPI  ALLERGY/IMMUN: negative except as noted in HPI  NEUROLOGICAL:  negative except as noted in HPI  BEHAVIOR/PSYCH:  negative except as noted in HPI    OBJECTIVE      Physical  VITALS:  /80   Pulse 69   Temp 98 °F (36.7 °C) (Oral)   Resp 20   Ht 5' 2\" (1.575 m)   Wt 194 lb 1.6 oz (88 kg)   LMP  (LMP Unknown)   SpO2 97%   BMI 35.50 kg/m²     Sitting in bedside chair resting with eyes closed. Awakens readily to name. Alert, oriented and cooperative. Mood is happy. Currently aphonic; communicates by mouthing words or note pad. Anup #6 cuffed tracheostomy tube in place; flange sutures intact; trach ties secure. Less secretions today. On trach mask. No distress.      Data  CBC with Differential:    Lab Results   Component Value Date    WBC 24.2 04/02/2022    RBC 4.73 04/02/2022    HGB 13.6 04/02/2022    HCT 43.0 04/02/2022     04/02/2022    MCV 90.9 04/02/2022    MCH 28.8 04/02/2022    MCHC 31.6 04/02/2022    NRBC 0 03/27/2022    SEGSPCT 65.9 03/27/2022    MONOPCT 7.6 03/27/2022    MONOSABS 1.1 03/27/2022    LYMPHSABS 3.2 03/27/2022    EOSABS 0.1 03/27/2022    BASOSABS 0.1 03/27/2022     BMP:    Lab Results   Component Value Date     04/02/2022    K 5.2 04/02/2022    K 5.0 03/23/2022     04/02/2022    CO2 29 04/02/2022    BUN 26 04/02/2022    LABALBU 4.3 03/18/2022    CREATININE 0.6 04/02/2022    CALCIUM 9.7 04/02/2022    LABGLOM >90 04/02/2022    GLUCOSE 96 04/02/2022       Inpatient Medications  Current Facility-Administered Medications: insulin glargine (LANTUS) injection vial 40 Units, 40 Units, SubCUTAneous, Nightly  insulin lispro (HUMALOG) injection vial 10 Units, 10 Units, SubCUTAneous, TID AC  glipiZIDE (GLUCOTROL) tablet 5 mg, 5 mg, Oral, QAM AC  ipratropium-albuterol (DUONEB) nebulizer solution 1 ampule, 1 ampule, Inhalation, Q4H PRN  insulin lispro (HUMALOG) injection vial 0-18 Units, 0-18 Units, SubCUTAneous, TID WC  [Held by provider] insulin lispro (HUMALOG) injection vial 0-9 Units, 0-9 Units, SubCUTAneous, Nightly  insulin lispro (HUMALOG) injection vial 0-6 Units, 0-6 Units, SubCUTAneous, Nightly  dexamethasone (DECADRON) injection 6 mg, 6 mg, IntraVENous, Q12H  busPIRone (BUSPAR) tablet 10 mg, 10 mg, Oral, TID  budesonide (PULMICORT) nebulizer suspension 500 mcg, 0.5 mg, Nebulization, BID  sodium chloride flush 0.9 % injection 5-40 mL, 5-40 mL, IntraVENous, 2 times per day  sodium chloride flush 0.9 % injection 5-40 mL, 5-40 mL, IntraVENous, PRN  0.9 % sodium chloride infusion, 25 mL, IntraVENous, PRN  ondansetron (ZOFRAN-ODT) disintegrating tablet 4 mg, 4 mg, Oral, Q8H PRN **OR** ondansetron (ZOFRAN) injection 4 mg, 4 mg, IntraVENous, Q6H PRN  polyethylene glycol (GLYCOLAX) packet 17 g, 17 g, Oral, Daily PRN  acetaminophen (TYLENOL) tablet 650 mg, 650 mg, Oral, Q6H PRN **OR** acetaminophen (TYLENOL) suppository 650 mg, 650 mg, Rectal, Q6H PRN  racepinephrine HCl (VAPONEFPRIN) 2.25 % nebulizer solution NEBU 11.25 mg, 11.25 mg, Nebulization, Q4H PRN  sodium chloride nebulizer 0.9 % solution 3 mL, 3 mL, Nebulization, Q4H PRN  [Held by provider] aspirin chewable tablet 81 mg, 81 mg, Oral, Daily  diclofenac sodium (VOLTAREN) 1 % gel 2 g, 2 g, Topical, 4x Daily PRN  famotidine (PEPCID) tablet 20 mg, 20 mg, Oral, BID  [Held by provider] furosemide (LASIX) tablet 40 mg, 40 mg, Oral, Daily  hydrOXYzine (VISTARIL) capsule 25 mg, 25 mg, Oral, 4x Daily PRN  melatonin tablet 6 mg, 6 mg, Oral, Nightly  nitroGLYCERIN (NITROSTAT) SL tablet 0.4 mg, 0.4 mg, SubLINGual, Q5 Min PRN  polycarbophil (FIBERCON) tablet 625 mg, 625 mg, Oral, Daily  rosuvastatin (CRESTOR) tablet 10 mg, 10 mg, Oral, Nightly  senna (SENOKOT) tablet 8.6 mg, 1 tablet, Oral, BID PRN  guaiFENesin (MUCINEX) extended release tablet 600 mg, 600 mg, Oral, BID  metoprolol succinate (TOPROL XL) extended release tablet 25 mg, 25 mg, Oral, Daily  glucagon (rDNA) injection 1 mg, 1 mg, IntraMUSCular, PRN  dextrose 5 % solution, 100 mL/hr, IntraVENous, PRN  glucose chewable tablet 4 each, 4 tablet, Oral, PRN  dextrose bolus (hypoglycemia) 10% 125 mL, 125 mL, IntraVENous, PRN **OR** dextrose bolus (hypoglycemia) 10% 250 mL, 250 mL, IntraVENous, PRN    ASSESSMENT AND PLAN    Glottic stenosis s/p tracheostomy placement 4/1/2022 with Dr Carmenza Lombardo  - Stopped IV Decadron. Start prednisone 30mg daily tomorrow. - Humidification per trach mask  - Tracheostomy care per protocol- okay to change soiled ties and drain sponge, clean around stoma  - Continue with tracheostomy teaching and encourage self care  - Discharge planning with supplies and home health  - Okay to ambulate in silver  - Medical management per primary team  - Plan Wednesday 4/6 at 10:30 to ENT office for scope with Dr Salazar Alonso.   Plan Thursday 4/7 in afternoon to OR with Dr Salazar Alonso to lateralize the right vocal cord and change to uncuffed tracheostomy tube. Patient okay for discharge that same day if all supplies, education, etc completed. Alonso Sellers was evaluated today and a DME order was entered for a nebulizer compressor in order to administer Budesonide and sodium chloride due to the diagnosis of glottic stenosis and tracheostomy status. The need for this equipment and treatment was discussed with the patient and she understands and is in agreement. Alonso Sellers will require the following home care treatments or therapies: nursing and speech therapy. Home care will be necessary because of new tracheostomy. The patient is in agreement to receiving home care.       Electronically signed by WYATT Ferraro CNP on 4/4/2022 at 7:57 AM

## 2022-04-04 NOTE — PROGRESS NOTES
Medicine Progress Note    Patient:  Karla Syed    Unit/Bed:4K-01/001-A  YOB: 1950  MRN: [id number]   Acct: [de-identified]   PCP: Julio C Reyes  Date of Admission: 3/27/2022    Hospital Course     Briefly, pt Karla Syed is a 73 y.o. female with a PMH of essential HTN, uncontrolled NIDDMT2 c/b diabetic neuropathy A1c 9.9% 12/21/21 on glipizide only, H/o COVID-19 infection requiring intubation c/b tracheal stenosis s/p suspension microlayngoscopy w/ relief of glottic stenosis / airway balloon dilation / laryngeal steroid injection 03/22/22, CAD s/p PCI w/ BMS to LAD in 2008 on ASA 81 mg daily, who presented with worsening shortness of breath 03/27/22 and was found to have significant recurrence glottic stenosis. Pt was also found to be hyperglycemic 2/2 initiation of steroids requiring insulin. Tracheostomy 04/01/22 by ENT. 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 Requiring [address]. Inspiratory and expiratory stidor on auscultation c/w fixed obstruction from glottic stenosis.  S/p suspension microlayngoscopy w/ relief of glottic stenosis / airway balloon dilation / laryngeal steroid injection 03/22/22 by Dr. Francis Gleason MD. 6 Shiley Cuffed Tracheostomy placed 04/01/22 by Dr. Francis Gleason MD.   Plan:   -ENT consulted, appreciate recs     +Continue HD steroids     +Continue humidification per trach mask     +Continue tracheostomy teaching and encourage self care     +Continue tracheostomy management     +Plan to change to uncuffed tube this week     +Continuous pulse oximetry     +Tracheostomy today; maintenance IV fluids  -Aspiration precautions  -Acapella as tolerated  -IS as tolerated  -Wean O2 requirement as tolerated    #Uncontrolled NIDDMT2 c/b bilateral neuropathy A1c 9.9 in 12/21/21: Active  Takes glipizide only at home  Diabetic Nephropathy: no screening in chart  Diabetic Retinopathy: no screening in chart  Diabetic Neuropathy: Bilateral LE sensation diminished; burning sensation present, not currently on pharmacotherapy  Plan:  -Hold ALL OGAs  -Continue High Dose SSI Algorithm on 10 units lispro TID  -Continue insulin glargine  -Hypoglycemia protocol  -POCT glucose qAC/HS  -BMP every 72 hours to monitor for electrolyte abnormalities    #Steroid-induced Leukocytosis: Stable  Edith concurrently w/ initiation of steroids. No s/s or evidence of infection at this time. Plan: Continue to monitor with CBC    #Non-obstructive CAD s/p PCI w/ BMS of LAD in 2008: Stable  Takes ASA 81 mg daily  Plan: Hold ASA 81 mg for tracheostomy; resume per ENT    #Chronic Systolic + Diastolic Heart Failure with EF 60% on 03/18/22, NYHA Class III 2/2 ICM: Stable  GDMT at home: none, started on metoprolol succinate 25 mg daily. Diuretics at home: furosemide. Follows in HF Clinic: No.   Plan:   -Strict I&Os  -Daily weights  -Refer to HF outpatient clinic on discharge if patient is amenable  -Continue metoprolol succinate 25 mg daily    #Acute on Chronic Anxiety: Stable  Started on LD Buspar  Plan: Continue Buspar    Dispo: 4k    Fluids: IVNS @ 75 cc/hr  Electrolyte: Replete as needed  Nutrition: NPO  GI: Pepcid    Lines/Tubes: Right forearm PIV 03/31/2022    DVT ppx: SCDs  PT/OT/SLP: PT/OT/SLP for further evaluation and management    Code status: Full Code No additional code details    Subjective     -Pt recovering nicely   -Trach mask in good position  -No acute events overnight  -No fevers, chills, nausea, or vomiting    Initial HPI     Maida Tomas is 73years old female who presented to the hospital for worsening shortness of breath. Past medical history significant with recent hospital admission for glottic stenosis (3/18-23/22), CAD SP PIC, hypertension, diabetes, anxiety.   Patient was recently admitted and managed for glottic stenosis with she underwent suspension microlaryngoscopy with relief of glottic stenosis, airway balloon dilation, and laryngeal steroid injection on 3/22/22. Patient was discharged home on 3/24. Since 3/25, patient has increase in mucus production which patient feeling \"something stuck in her throat\". She has been using Intensive spirometry at home which she recall that from yesterday she couldn't be able to get any air into the spirometry. Patient reported SOB has been worsening which she decided to to to ED. Denied any fever, chill, chest pain, yellow cough without sputum production, recent sick contact.     In the ED, patient O2 sat 93% on room air. Patient received Decadron 8 mg and racemic epi which her SOB has been improved and O2 sat 100% on room air.      Upon visit patient can talk in full conversation without respiratory distress. \" - Dr. Corie Grace, DO     Objective     Vitals:     /78   Pulse 70   Temp 98.4 °F (36.9 °C) (Oral)   Resp 18   Ht 5' 2\" (1.575 m)   Wt 194 lb 1.6 oz (88 kg)   LMP  (LMP Unknown)   SpO2 99%   BMI 35.50 kg/m²     [facility] Settings:                     FiO2 : 25 %     Intake and Output:       Intake/Output Summary (Last 24 hours) at 4/4/2022 1402  Last data filed at 4/4/2022 1234  Gross per 24 hour   Intake 570 ml   Output 2050 ml   Net -1480 ml       Outpatient Medications:     Scheduled Meds:   insulin glargine  40 Units SubCUTAneous Nightly    insulin lispro  10 Units SubCUTAneous TID AC    glipiZIDE  5 mg Oral QAM AC    insulin lispro  0-18 Units SubCUTAneous TID WC    [Held by provider] insulin lispro  0-9 Units SubCUTAneous Nightly    insulin lispro  0-6 Units SubCUTAneous Nightly    dexamethasone  6 mg IntraVENous Q12H    busPIRone  10 mg Oral TID    budesonide  0.5 mg Nebulization BID    sodium chloride flush  5-40 mL IntraVENous 2 times per day    [Held by provider] aspirin  81 mg Oral Daily    famotidine  20 mg Oral BID    [Held by provider] furosemide  40 mg Oral Daily    melatonin  6 mg Oral Nightly    polycarbophil  625 mg Oral Daily    rosuvastatin  10 mg Oral Nightly    guaiFENesin  600 mg Oral BID    metoprolol succinate  25 mg Oral Daily     Continuous Infusions:   sodium chloride      dextrose       PRN Meds:ipratropium-albuterol, sodium chloride flush, sodium chloride, ondansetron **OR** ondansetron, polyethylene glycol, acetaminophen **OR** acetaminophen, racepinephrine HCl, sodium chloride nebulizer, diclofenac sodium, hydrOXYzine, nitroGLYCERIN, senna, glucagon (rDNA), dextrose, glucose, dextrose bolus (hypoglycemia) **OR** dextrose bolus (hypoglycemia)    Physical Exam:     Physical Exam  Constitutional:       General: She is not in acute distress. Appearance: She is obese. She is not ill-appearing. HENT:      Head: Normocephalic and atraumatic. Nose: No congestion or rhinorrhea. Mouth/Throat:      Mouth: Mucous membranes are moist.      Pharynx: Oropharynx is clear. No oropharyngeal exudate or posterior oropharyngeal erythema. Eyes:      General: No scleral icterus. Extraocular Movements: Extraocular movements intact. Pupils: Pupils are equal, round, and reactive to light. Neck:      Trachea: Tracheostomy (good position, no erythema or purulence around incision site.) present. Cardiovascular:      Rate and Rhythm: Normal rate and regular rhythm. Pulses: Normal pulses. Heart sounds: Normal heart sounds. No murmur heard. No friction rub. No gallop. Pulmonary:      Effort: Pulmonary effort is normal.      Breath sounds: No stridor. No wheezing. Abdominal:      General: Abdomen is flat. Palpations: Abdomen is soft. Tenderness: There is no abdominal tenderness. There is no guarding or rebound. Musculoskeletal:         General: Normal range of motion. Right lower leg: No edema. Left lower leg: No edema. Skin:     General: Skin is warm and dry. Capillary Refill: Capillary refill takes less than 2 seconds. Neurological:      General: No focal deficit present. Mental Status: She is alert and oriented to person, place, and time. Psychiatric:         Mood and Affect: Mood normal.         Behavior: Behavior normal.         Labs:     Recent Labs     04/02/22  0845   WBC 24.2*   HGB 13.6   HCT 43.0      MPV 10.7     Recent Labs     04/02/22  0845 04/02/22  1730    140   K 5.3* 5.2    101   CO2 25 29   BUN 24* 26*   CREATININE 0.6 0.6   GLUCOSE 325* 96     No results for input(s): BILITOT, BILIDIR, PROT, ALBUMIN, AST in the last 72 hours. Invalid input(s): ALK, LABALT  No results for input(s): TROPONINI, CKMB in the last 72 hours. Invalid input(s): CKTOT  No results for input(s): CHOL, TRIG, HDL, LDL in the last 72 hours. No results for input(s): SEDRATE, HSCRP in the last 72 hours. No results for input(s): APTT, INR in the last 72 hours. Invalid input(s): PT  Invalid input(s): HGBA1C  No results for input(s): PH, [id number], PO2, BASE in the last 72 hours. Invalid input(s): HOD8NBN, SO2, INSPIREDO2  No results for input(s): ABO, RH in the last 72 hours. @LABALLVALUEIP(AMYLASE:5)@  No results for input(s): COLORU, PROTUR, UROBILINOGEN, OSMOLALITY, NAUR, KUR, CLUR, AMPHETAMINE, BENZOURQL, CANNABINOIDS, COCAINE, PCP in the last 72 hours. Invalid input(s): Philmore Judith, MJ, GLUCUA, Lauralyn Mendenhall, BILIRUBIN, HGBUA, NITRITEU, LEUKOCTEST, WBCU, RBCU, MUCOUS, SPECGRAVUR, BARBSCRNUR, DOAUR  No results for input(s): TSH, T3FREE, FREET4 in the last 72 hours. Diagnostics:     Imaging:  No results found.     EKG:   No new    Micro:   Patient Infection Status     Infection Onset Added Last Indicated Last Indicated By Review Planned Expiration Resolved Resolved By    None active    Resolved    COVID-19 12/17/21 12/19/21 12/17/21 COVID-19   01/06/22 Raj Serrano, RN    +12/17, admit 12/18 - 80%          Path:   N/A    Signed:  Carlin Ramos MD  Internal Medicine, PGY-1  4/4/2022  2:02 PM    Staff: Dr. Katelin Gracia MD

## 2022-04-04 NOTE — TELEPHONE ENCOUNTER
Discussed with Care Manager at hospital.  He will reach out to Jami Pradhan and determine exactly what is needed yet. Some of those orders were already sent.

## 2022-04-04 NOTE — TELEPHONE ENCOUNTER
Estela Record from the medical supply is asking about the trach order. She is asking if its the suction machine or the whole set up? If its the whole set up,what size trach,tubes? She stated that she needs another diagnosis on the order, the one on it she doesn't think medicare will cover it.     Please advise   [phone] ext [phone]

## 2022-04-04 NOTE — PROGRESS NOTES
[address] [facility] PHYSICAL THERAPY  DAILY NOTE  [facility] ICU STEPDOWN TELEMETRY 4K - 4K-01/001-A    Time In: 0900  Time Out: 0924  Timed Code Treatment Minutes: 24 Minutes  Minutes: 24          Date: 2022  Patient Name: Cassandra Levine,  Gender:  female        MRN: [id number]  : 1950  (73 y.o.)     Referring Practitioner: Macie Cervantes DO  Diagnosis: SOB  Additional Pertinent Hx: The patient is a 73 y.o. female who was admitted to [address] [place] [address] late last evening. She is known to our service - s/p suspension microlaryngosocpy with balloon dilation and laryngeal steroid injection secondary to glottic stenosis 3/22/2022 with Dr Zelalem Li and Dr Victor Hugo Menon. Patient was discharged from hospital on 3/23/2022 with significant improvement in her breathing. Patient reports that she continues on supplemental oxygen at home. She reports progressive shortness of breath and stridor over the weekend. She states that she truly felt she could not breathe and was not sure if she would live while traveling to the ER last evening. Patient has history of hospitalization here at [facility] from 2021-2022 for severe COVID-19 infection with respiratory failure. She failed HFNC and BiPAP progressing to need for endotracheal intubation on 2021 secondary to severe ARDS. She was extubated on 2022. Patient reports hoarse voice following extubation. She was transferred to inpatient rehab from following acute hospital stay. She was discharged on supplemental oxygen. She had ER visit on 3/9/2022 for shortness of breath, then presented again on 3/18/2022 for shortness of breath with admission. Pt is s/p SUSPENSION MICROLARYNGOSCOPY WITH BALLOON DILATION AND JET VENT, KENALOG INJECTION, flexible nasolaryngoscopy 3/28. Pt s/p trach placement .      Prior Level of Function:  Lives With: Spouse  Type of Home: House  Home Layout: One level,Able to Live on Main level with bedroom/bathroom  Home Access: Stairs to enter without rails  Entrance Stairs - Number of Steps: 2-4inch steps, uses door frame  Home Equipment: Wheelchair-manual (NO AD at home, wheelchair for further distances in community)   Bathroom Shower/Tub: Walk-in shower,Shower chair without back (bench)  Bathroom Toilet: Standard  Bathroom Equipment: Grab bars in shower  Bathroom Accessibility: Accessible    Receives Help From: Family  ADL Assistance: Independent  Homemaking Assistance: Needs assistance  Ambulation Assistance: Independent  Transfer Assistance: Independent  Active : Yes (Patient reports she has driven a few times ~ 5 miles)  Additional Comments: Pt amb without AD    Restrictions/Precautions:  Restrictions/Precautions: Fall Risk  Position Activity Restriction  Other position/activity restrictions: Trach mask     SUBJECTIVE: RN approved session, pt is supine in bed, agreeable to PT. PAIN: denies    Vitals: Oxygen: 99% during ambulation    OBJECTIVE:  Bed Mobility:  Supine to Sit: Stand By Assistance    Transfers:  Sit to Stand: Contact Guard Assistance  Stand to [employer] Assistance    Ambulation:  Contact Guard Assistance  Distance: 50 feet  Surface: Level Tile  Device:No Device  Gait Deviations:  Slow Haylee, Decreased Step Length Bilaterally, Decreased Gait Speed and Mild Path Deviations    Contact Guard Assistance  Distance: 125 feet  Surface: Level Tile  Device:Rolling Walker  Gait Deviations:  Slow Haylee, Decreased Step Length Bilaterally and Decreased Gait Speed  **Pt education to use walker at home for improved stability    Balance:  Static Sitting Balance:  Independent  Static Standing Balance: Contact Guard Assistance  Dynamic Standing Balance: Contact Guard Assistance    Exercise:  Patient was guided in 1 set(s) 20 reps of exercise to both lower extremities. Ankle pumps, Quad sets, Heelslides and Hip abduction/adduction.   Exercises were completed for increased independence with functional mobility. Functional Outcome Measures: Not completed     ASSESSMENT:  Assessment: Patient progressing toward established goals. Activity Tolerance:  Patient tolerance of  treatment: good. Equipment Recommendations:Equipment Needed: No  Discharge Recommendations: Home with Assist as Needed  Plan: Times per week: 2-3x GM  Current Treatment Recommendations: Strengthening,Functional Mobility Training,Transfer Training,Balance Training,Endurance Training,Gait Training,Stair training,Neuromuscular Re-education,Patient/Caregiver Education & Training    Patient Education  Patient Education: Transfers, Gait    Goals:  Patient goals : to go home  Short term goals  Time Frame for Short term goals: by discharge  Short term goal 1: Pt to transfer sit <--> stand I for increased functional mobility. Short term goal 2: Pt to ambulate >200 feet without AD with Supervision for household ambulation. Short term goal 3: Pt to ascend/descend 2 steps with HR SBA for home entry. Long term goals  Time Frame for Long term goals : NA due to short length of stay. Following session, patient left in safe position with all fall risk precautions in place.

## 2022-04-04 NOTE — PROGRESS NOTES
Evaluated patient cost of [employer] for [place], [place]. Cost to patient for first fill of any strength is $236.62 due to $45.71 being applied to deductible. After deductible met, cost is $190.91/month for any strength. -RANJITH Lora (ext. [id number]) 4/4/2022,3:32 PM

## 2022-04-05 LAB
ANION GAP SERPL CALCULATED.3IONS-SCNC: 9 MEQ/L (ref 8–16)
BUN BLDV-MCNC: 28 MG/DL (ref 7–22)
CALCIUM SERPL-MCNC: 9 MG/DL (ref 8.5–10.5)
CHLORIDE BLD-SCNC: 102 MEQ/L (ref 98–111)
CO2: 26 MEQ/L (ref 23–33)
CREAT SERPL-MCNC: 0.7 MG/DL (ref 0.4–1.2)
GFR SERPL CREATININE-BSD FRML MDRD: 83 ML/MIN/1.73M2
GLUCOSE BLD-MCNC: 121 MG/DL (ref 70–108)
GLUCOSE BLD-MCNC: 181 MG/DL (ref 70–108)
GLUCOSE BLD-MCNC: 196 MG/DL (ref 70–108)
GLUCOSE BLD-MCNC: 216 MG/DL (ref 70–108)
GLUCOSE BLD-MCNC: 362 MG/DL (ref 70–108)
POTASSIUM REFLEX MAGNESIUM: 5.5 MEQ/L (ref 3.5–5.2)
SODIUM BLD-SCNC: 137 MEQ/L (ref 135–145)

## 2022-04-05 PROCEDURE — 6360000002 HC RX W HCPCS: Performed by: NURSE PRACTITIONER

## 2022-04-05 PROCEDURE — 82948 REAGENT STRIP/BLOOD GLUCOSE: CPT

## 2022-04-05 PROCEDURE — 94760 N-INVAS EAR/PLS OXIMETRY 1: CPT

## 2022-04-05 PROCEDURE — 80048 BASIC METABOLIC PNL TOTAL CA: CPT

## 2022-04-05 PROCEDURE — 6370000000 HC RX 637 (ALT 250 FOR IP): Performed by: INTERNAL MEDICINE

## 2022-04-05 PROCEDURE — 2060000000 HC ICU INTERMEDIATE R&B

## 2022-04-05 PROCEDURE — 2700000000 HC OXYGEN THERAPY PER DAY

## 2022-04-05 PROCEDURE — 6370000000 HC RX 637 (ALT 250 FOR IP): Performed by: STUDENT IN AN ORGANIZED HEALTH CARE EDUCATION/TRAINING PROGRAM

## 2022-04-05 PROCEDURE — 94640 AIRWAY INHALATION TREATMENT: CPT

## 2022-04-05 PROCEDURE — 99232 SBSQ HOSP IP/OBS MODERATE 35: CPT | Performed by: INTERNAL MEDICINE

## 2022-04-05 PROCEDURE — 97535 SELF CARE MNGMENT TRAINING: CPT

## 2022-04-05 PROCEDURE — 99024 POSTOP FOLLOW-UP VISIT: CPT | Performed by: NURSE PRACTITIONER

## 2022-04-05 PROCEDURE — 97530 THERAPEUTIC ACTIVITIES: CPT

## 2022-04-05 PROCEDURE — 2580000003 HC RX 258: Performed by: STUDENT IN AN ORGANIZED HEALTH CARE EDUCATION/TRAINING PROGRAM

## 2022-04-05 PROCEDURE — 6370000000 HC RX 637 (ALT 250 FOR IP): Performed by: NURSE PRACTITIONER

## 2022-04-05 PROCEDURE — 6370000000 HC RX 637 (ALT 250 FOR IP)

## 2022-04-05 PROCEDURE — 36415 COLL VENOUS BLD VENIPUNCTURE: CPT

## 2022-04-05 RX ORDER — INSULIN GLARGINE 100 [IU]/ML
30 INJECTION, SOLUTION SUBCUTANEOUS NIGHTLY
Status: DISCONTINUED | OUTPATIENT
Start: 2022-04-05 | End: 2022-04-07

## 2022-04-05 RX ADMIN — BUSPIRONE HYDROCHLORIDE 10 MG: 10 TABLET ORAL at 21:09

## 2022-04-05 RX ADMIN — BUSPIRONE HYDROCHLORIDE 10 MG: 10 TABLET ORAL at 09:00

## 2022-04-05 RX ADMIN — FAMOTIDINE 20 MG: 20 TABLET ORAL at 21:08

## 2022-04-05 RX ADMIN — GLIPIZIDE 5 MG: 5 TABLET ORAL at 09:01

## 2022-04-05 RX ADMIN — INSULIN LISPRO 15 UNITS: 100 INJECTION, SOLUTION INTRAVENOUS; SUBCUTANEOUS at 16:39

## 2022-04-05 RX ADMIN — METOPROLOL SUCCINATE 25 MG: 25 TABLET, FILM COATED, EXTENDED RELEASE ORAL at 09:00

## 2022-04-05 RX ADMIN — PREDNISONE 30 MG: 20 TABLET ORAL at 09:00

## 2022-04-05 RX ADMIN — ROSUVASTATIN CALCIUM 10 MG: 10 TABLET, FILM COATED ORAL at 21:09

## 2022-04-05 RX ADMIN — Medication 6 MG: at 21:13

## 2022-04-05 RX ADMIN — SODIUM CHLORIDE, PRESERVATIVE FREE 10 ML: 5 INJECTION INTRAVENOUS at 21:09

## 2022-04-05 RX ADMIN — INSULIN LISPRO 10 UNITS: 100 INJECTION, SOLUTION INTRAVENOUS; SUBCUTANEOUS at 09:15

## 2022-04-05 RX ADMIN — INSULIN LISPRO 3 UNITS: 100 INJECTION, SOLUTION INTRAVENOUS; SUBCUTANEOUS at 09:05

## 2022-04-05 RX ADMIN — FAMOTIDINE 20 MG: 20 TABLET ORAL at 09:00

## 2022-04-05 RX ADMIN — INSULIN LISPRO 10 UNITS: 100 INJECTION, SOLUTION INTRAVENOUS; SUBCUTANEOUS at 16:44

## 2022-04-05 RX ADMIN — INSULIN GLARGINE 30 UNITS: 100 INJECTION, SOLUTION SUBCUTANEOUS at 21:04

## 2022-04-05 RX ADMIN — SODIUM CHLORIDE, PRESERVATIVE FREE 10 ML: 5 INJECTION INTRAVENOUS at 09:17

## 2022-04-05 RX ADMIN — GUAIFENESIN 600 MG: 600 TABLET, EXTENDED RELEASE ORAL at 09:00

## 2022-04-05 RX ADMIN — GLIPIZIDE 5 MG: 5 TABLET ORAL at 16:40

## 2022-04-05 RX ADMIN — GUAIFENESIN 600 MG: 600 TABLET, EXTENDED RELEASE ORAL at 21:08

## 2022-04-05 RX ADMIN — BUDESONIDE 500 MCG: 0.5 INHALANT RESPIRATORY (INHALATION) at 09:10

## 2022-04-05 RX ADMIN — BUSPIRONE HYDROCHLORIDE 10 MG: 10 TABLET ORAL at 14:13

## 2022-04-05 RX ADMIN — INSULIN LISPRO 2 UNITS: 100 INJECTION, SOLUTION INTRAVENOUS; SUBCUTANEOUS at 21:04

## 2022-04-05 RX ADMIN — CALCIUM POLYCARBOPHIL 625 MG: 625 TABLET, FILM COATED ORAL at 09:00

## 2022-04-05 ASSESSMENT — PAIN SCALES - GENERAL
PAINLEVEL_OUTOF10: 0

## 2022-04-05 NOTE — PROGRESS NOTES
Department of Otolaryngology  Progress Note    Chief Complaint:  POD 4 s/p tracheostomy placement with Dr Shannan Giraldo secondary to glottic stenosis    SUBJECTIVE:  No acute events overnight. Patient reports that she continues to feel well. She is tolerating suctioning per tracheostomy; secretions have slowed. On trach mask 28%. She denies any pain. A complete multi-organ review of systems was performed using a new patient questionnaire, and reviewed by me. ENT:  negative except as noted in HPI  CONSTITUTIONAL:  negative except as noted in HPI  EYES:  negative except as noted in HPI  RESPIRATORY:  negative except as noted in HPI  CARDIOVASCULAR:  negative except as noted in HPI  GASTROINTESTINAL:  negative except as noted in HPI  GENITOURINARY:  negative except as noted in HPI  MUSCULOSKELETAL:  negative except as noted in HPI  SKIN:  negative except as noted in HPI  ENDOCRINE/METABOLIC: negative except as noted in HPI  HEMATOLOGIC/LYMPHATIC:  negative except as noted in HPI  ALLERGY/IMMUN: negative except as noted in HPI  NEUROLOGICAL:  negative except as noted in HPI  BEHAVIOR/PSYCH:  negative except as noted in HPI    OBJECTIVE      Physical  VITALS:  /86   Pulse 70   Temp 97.7 °F (36.5 °C) (Oral)   Resp 20   Ht 5' 2\" (1.575 m)   Wt 190 lb 8 oz (86.4 kg)   LMP  (LMP Unknown)   SpO2 97%   BMI 34.84 kg/m²     Sitting in bedside chair resting with eyes closed.  in room. Awakens readily to name. Alert, oriented and cooperative. Mood is happy. Currently aphonic; communicates by mouthing words or note pad. Shiley #6 cuffed tracheostomy tube in place; flange sutures intact; trach ties secure. On trach mask. No distress.      Data  CBC with Differential:    Lab Results   Component Value Date    WBC 24.2 04/02/2022    RBC 4.73 04/02/2022    HGB 13.6 04/02/2022    HCT 43.0 04/02/2022     04/02/2022    MCV 90.9 04/02/2022    MCH 28.8 04/02/2022    MCHC 31.6 04/02/2022    NRBC 0 03/27/2022 SEGSPCT 65.9 03/27/2022    MONOPCT 7.6 03/27/2022    MONOSABS 1.1 03/27/2022    LYMPHSABS 3.2 03/27/2022    EOSABS 0.1 03/27/2022    BASOSABS 0.1 03/27/2022     BMP:    Lab Results   Component Value Date     04/02/2022    K 5.2 04/02/2022    K 5.0 03/23/2022     04/02/2022    CO2 29 04/02/2022    BUN 26 04/02/2022    LABALBU 4.3 03/18/2022    CREATININE 0.6 04/02/2022    CALCIUM 9.7 04/02/2022    LABGLOM >90 04/02/2022    GLUCOSE 96 04/02/2022       Inpatient Medications  Current Facility-Administered Medications: insulin glargine (LANTUS) injection vial 40 Units, 40 Units, SubCUTAneous, Nightly  insulin lispro (HUMALOG) injection vial 10 Units, 10 Units, SubCUTAneous, TID AC  glipiZIDE (GLUCOTROL) tablet 5 mg, 5 mg, Oral, BID AC  predniSONE (DELTASONE) tablet 30 mg, 30 mg, Oral, Daily  ipratropium-albuterol (DUONEB) nebulizer solution 1 ampule, 1 ampule, Inhalation, Q4H PRN  insulin lispro (HUMALOG) injection vial 0-18 Units, 0-18 Units, SubCUTAneous, TID WC  [Held by provider] insulin lispro (HUMALOG) injection vial 0-9 Units, 0-9 Units, SubCUTAneous, Nightly  insulin lispro (HUMALOG) injection vial 0-6 Units, 0-6 Units, SubCUTAneous, Nightly  busPIRone (BUSPAR) tablet 10 mg, 10 mg, Oral, TID  budesonide (PULMICORT) nebulizer suspension 500 mcg, 0.5 mg, Nebulization, BID  sodium chloride flush 0.9 % injection 5-40 mL, 5-40 mL, IntraVENous, 2 times per day  sodium chloride flush 0.9 % injection 5-40 mL, 5-40 mL, IntraVENous, PRN  0.9 % sodium chloride infusion, 25 mL, IntraVENous, PRN  ondansetron (ZOFRAN-ODT) disintegrating tablet 4 mg, 4 mg, Oral, Q8H PRN **OR** ondansetron (ZOFRAN) injection 4 mg, 4 mg, IntraVENous, Q6H PRN  polyethylene glycol (GLYCOLAX) packet 17 g, 17 g, Oral, Daily PRN  acetaminophen (TYLENOL) tablet 650 mg, 650 mg, Oral, Q6H PRN **OR** acetaminophen (TYLENOL) suppository 650 mg, 650 mg, Rectal, Q6H PRN  racepinephrine HCl (VAPONEFPRIN) 2.25 % nebulizer solution NEBU 11.25 mg, 11.25 mg, Nebulization, Q4H PRN  sodium chloride nebulizer 0.9 % solution 3 mL, 3 mL, Nebulization, Q4H PRN  [Held by provider] aspirin chewable tablet 81 mg, 81 mg, Oral, Daily  diclofenac sodium (VOLTAREN) 1 % gel 2 g, 2 g, Topical, 4x Daily PRN  famotidine (PEPCID) tablet 20 mg, 20 mg, Oral, BID  [Held by provider] furosemide (LASIX) tablet 40 mg, 40 mg, Oral, Daily  hydrOXYzine (VISTARIL) capsule 25 mg, 25 mg, Oral, 4x Daily PRN  melatonin tablet 6 mg, 6 mg, Oral, Nightly  nitroGLYCERIN (NITROSTAT) SL tablet 0.4 mg, 0.4 mg, SubLINGual, Q5 Min PRN  polycarbophil (FIBERCON) tablet 625 mg, 625 mg, Oral, Daily  rosuvastatin (CRESTOR) tablet 10 mg, 10 mg, Oral, Nightly  senna (SENOKOT) tablet 8.6 mg, 1 tablet, Oral, BID PRN  guaiFENesin (MUCINEX) extended release tablet 600 mg, 600 mg, Oral, BID  metoprolol succinate (TOPROL XL) extended release tablet 25 mg, 25 mg, Oral, Daily  glucagon (rDNA) injection 1 mg, 1 mg, IntraMUSCular, PRN  dextrose 5 % solution, 100 mL/hr, IntraVENous, PRN  glucose chewable tablet 4 each, 4 tablet, Oral, PRN  dextrose bolus (hypoglycemia) 10% 125 mL, 125 mL, IntraVENous, PRN **OR** dextrose bolus (hypoglycemia) 10% 250 mL, 250 mL, IntraVENous, PRN    ASSESSMENT AND PLAN    Glottic stenosis s/p tracheostomy placement 4/1/2022 with Dr Nimisha Thomas  - Prednisone 30mg daily started today  - Humidification per trach mask  - Tracheostomy care per protocol- okay to change soiled ties and drain sponge, clean around stoma  - Continue with tracheostomy teaching and encourage self care  - Discharge planning with supplies and home health  - Okay to ambulate in silver  - Medical management per primary team  - Plan Wednesday 4/6 at 10:30 to ENT office for scope with Dr Bambi Sterling. Plan Thursday 4/7 in afternoon to OR with Dr Bambi Sterling to lateralize the right vocal cord and change to uncuffed tracheostomy tube.   Patient okay for discharge that same day if all supplies, education, etc completed.       Electronically signed by WYATT Whitmore CNP on 4/5/2022 at 7:57 AM

## 2022-04-05 NOTE — PROGRESS NOTES
[address] ICU STEPDOWN TELEMETRY 4K  Occupational Therapy  Daily Note  Time:   Time In: 5374  Time Out: 9740  Timed Code Treatment Minutes: 38 Minutes  Minutes: 38          Date: 2022  Patient Name: Eamon Bone,   Gender: female      Room: -001-A  MRN: [id number]  : 1950  (73 y.o.)  Referring Practitioner: Joel Allen DO  Diagnosis: Shortness of breath  Additional Pertinent Hx: The patient is a 73 y.o. female who was admitted to [address] [place] [address] late last evening. She is known to our service - s/p suspension microlaryngosocpy with balloon dilation and laryngeal steroid injection secondary to glottic stenosis 3/22/2022 with Dr Gladys Youngblood and Dr Brittanie Buenrostro. Patient was discharged from hospital on 3/23/2022 with significant improvement in her breathing. Patient reports that she continues on supplemental oxygen at home. She reports progressive shortness of breath and stridor over the weekend. She states that she truly felt she could not breathe and was not sure if she would live while traveling to the ER last evening. Patient has history of hospitalization here at [address] [place] [address] from 2021-2022 for severe COVID-19 infection with respiratory failure. She failed HFNC and BiPAP progressing to need for endotracheal intubation on 2021 secondary to severe ARDS. She was extubated on 2022. Patient reports hoarse voice following extubation. She was transferred to inpatient rehab from following acute hospital stay. She was discharged on supplemental oxygen. She had ER visit on 3/9/2022 for shortness of breath, then presented again on 3/18/2022 for shortness of breath with admission.     Restrictions/Precautions:  Restrictions/Precautions: Fall Risk  Position Activity Restriction  Other position/activity restrictions: Trach mask      SUBJECTIVE: Pt seated in bedside chair upon arrival, just finishing breakfast. Pt agreeable to OT session and demonstrating EC techniques to increase endurance in home environment. Short term goal 3: Pt will complete functional mobility to/from BR and HH distances with S and 0 vcs for safety to increase indep and endurance with self cares. Following session, patient left in safe position with all fall risk precautions in place.

## 2022-04-05 NOTE — PLAN OF CARE
Problem: Falls - Risk of:  Goal: Will remain free from falls  Description: Will remain free from falls  Outcome: Ongoing  Note: Remained free from falls this shift. Safety measures in place Belongings within reach. Problem: Daily Care:  Goal: Daily care needs are met  Description: Daily care needs are met  Outcome: Ongoing  Note: Daily care needs are being met. Problem: Pain:  Goal: Pain level will decrease  Description: Pain level will decrease  Outcome: Ongoing  Note:   Pain Assessment: 0-10  Pain Level: 0   Patient's Stated Pain Goal: No pain   Is pain goal met at this time? Yes  Patient repositioned. Problem: Skin Integrity:  Goal: Skin integrity will stabilize  Description: Skin integrity will stabilize  Outcome: Ongoing  Note: No new skin issues this shift. Problem: Discharge Planning:  Goal: Patients continuum of care needs are met  Description: Patients continuum of care needs are met  Outcome: Ongoing  Note: Care needs are met this shift. Problem: OXYGENATION/RESPIRATORY FUNCTION  Goal: Patient will maintain patent airway  Outcome: Ongoing  Note: Airway maintained this shift. Problem: Skin Integrity:  Goal: Absence of new skin breakdown  Description: Absence of new skin breakdown  Outcome: Ongoing  Note: No new skin break down. Care plan reviewed with patient. Patient verbalized understanding of the plan of care and contribute to goal setting.

## 2022-04-05 NOTE — PLAN OF CARE
Problem: RESPIRATORY  Goal: Clear lung sounds  Description: Clear lung sounds  4/5/2022 0916 by Americo Burns RCP  Outcome: Ongoing  Note: Pulmicort to improve breath sounds.

## 2022-04-05 NOTE — CARE COORDINATION
Collaborative Discharge Planning    Kathleen Garcia  :  1950  MRN:  [id number]    ADMIT DATE:  3/27/2022      Discharge Planning Discharge Planning  Living Arrangements: Spouse/Significant Other  Support Systems: Family Members  [address] [place] [address] Region: [address] [place] [address]  DME: Trach Suction Supplies,Oxygen Therapy (Comment)  Potential Assistance Needed: Home Care  Type of Home Care Services: Nursing Services  Patient expects to be discharged to[de-identified] House  White Board Notes /Social Work Whiteboard Notes  /Social Work Whiteboard: ; SW/CM: [facility]. Has home O2 through [address] trach on . Procedure   3/28 Suspension Microlaryngoscopy w Balloon Dilation and Jet Ventilaton, Kenalog Injection, Flexibile Nasolaryngoscopypy     Tracheostomy     Office Laryngoscopy     Lateralize Right Vocal Cord and change to uncuffed tracheostomy tube    Discharge Plan Home with family  Plans home  w spouse Caridad Hammond, has [employer] home oxygen 2L and will need new trach/suction/mist supplies; prefers [facility] (nsg, ST); therapy following; [facility]      Discharge Milestones and Delays: Clinical status  Glottic Stenosis. Trach 2L (28% FIO2) continued.  RCP training w family re: suctioning, trach care  SIGNED:  Jane Ernst RN   2022, 11:34 AM

## 2022-04-05 NOTE — PLAN OF CARE
Problem: Falls - Risk of:  Goal: Will remain free from falls  Description: Will remain free from falls  Outcome: Ongoing  Note: Patient is free from falls this shift. Problem: Daily Care:  Goal: Daily care needs are met  Description: Daily care needs are met  Outcome: Ongoing  Note: Daily care is being met by this nurse and patient. Problem: Pain:  Goal: Pain level will decrease  Description: Pain level will decrease  Outcome: Met This Shift  Note: Pain Assessment: 0-10  Pain Level: 0   Patient's Stated Pain Goal: No pain   Is pain goal met at this time? Yes     Non-Pharmaceutical Pain Intervention(s): Repositioned,Rest (prn tylenol)       Problem: Discharge Planning:  Goal: Patients continuum of care needs are met  Description: Patients continuum of care needs are met  Outcome: Ongoing  Note: No new discharge plans this shift. Problem: OXYGENATION/RESPIRATORY FUNCTION  Goal: Patient will maintain patent airway  4/5/2022 0050 by Amanda Caldera RN  Outcome: Ongoing  Note: Patient has maintained a patent airway by coughing and suctioning patient. Problem: Skin Integrity:  Goal: Absence of new skin breakdown  Description: Absence of new skin breakdown  Outcome: Ongoing  Note: Patient is absent of new skin breakdown this shift. Care plan reviewed with patient. Patient verbalize understanding of the plan of care and contribute to goal setting.

## 2022-04-05 NOTE — PROGRESS NOTES
Patient and her  were taught trach care. Explained how to clean. I showed patient and  how to suction and and remove and insert innercannula.  did great doing both, but patient would only remove and insert inner cannula would not suction herself. I passed this along to her resp therapist so we continue to work with patient.

## 2022-04-05 NOTE — PLAN OF CARE
Problem: OXYGENATION/RESPIRATORY FUNCTION  Goal: Patient will maintain patent airway  4/4/2022 2205 by Desiree Grant RCP  Outcome: Ongoing     Problem: RESPIRATORY  Goal: Clear lung sounds  Description: Clear lung sounds  Outcome: Ongoing

## 2022-04-05 NOTE — PROGRESS NOTES
Medicine Progress Note    Patient:  Joe Oswald    Unit/Bed:4K-01/001-A  YOB: 1950  MRN: [id number]   Acct: [de-identified]   PCP: Li Cesar  Date of Admission: 3/27/2022    Hospital Course     Briefly, pt Joe Oswald is a 73 y.o. female with a PMH of essential HTN, uncontrolled NIDDMT2 c/b diabetic neuropathy A1c 9.9% 12/21/21 on glipizide only, H/o COVID-19 infection requiring intubation c/b tracheal stenosis s/p suspension microlayngoscopy w/ relief of glottic stenosis / airway balloon dilation / laryngeal steroid injection 03/22/22, CAD s/p PCI w/ BMS to LAD in 2008 on ASA 81 mg daily, who presented with worsening shortness of breath 03/27/22 and was found to have significant recurrence glottic stenosis. Pt was also found to be hyperglycemic 2/2 initiation of steroids requiring insulin. Tracheostomy 04/01/22 by ENT. 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 Requiring [address]. Inspiratory and expiratory stidor on auscultation c/w fixed obstruction from glottic stenosis.  S/p suspension microlayngoscopy w/ relief of glottic stenosis / airway balloon dilation / laryngeal steroid injection 03/22/22 by Dr. Vero Adkins MD. 6 Shiley Cuffed Tracheostomy placed 04/01/22 by Dr. Vero Adkins MD.   Plan:   -ENT consulted, appreciate recs     +Switch from decadron to PO prednisone 30 mg daily     +Continue humidification per tracheostomy mask     +Continue tracheostomy teaching and encourage self care     +Continue tracheostomy management     +Plan to change to uncuffed tube this week     +Continuous pulse oximetry     +Tracheostomy today; maintenance IV fluids  -Aspiration precautions  -Acapella as tolerated  -IS as tolerated  -Wean O2 requirement as tolerated    #Uncontrolled NIDDMT2 c/b bilateral neuropathy A1c 9.9 in 12/21/21: Active  Takes glipizide only at home  Diabetic Nephropathy: no screening in chart  Diabetic Retinopathy: no screening in chart  Diabetic Neuropathy: Bilateral LE sensation diminished; burning sensation present, not currently on pharmacotherapy  Plan:  -Continue glipizide to improve insulin resistance in the setting of steroid use  -Continue High Dose SSI Algorithm on 10 units lispro TID  -Continue insulin glargine  -Hypoglycemia protocol  -POCT glucose qAC/HS  -BMP every 72 hours to monitor for electrolyte abnormalities    #Steroid-induced Leukocytosis: Stable  Edith concurrently w/ initiation of steroids. No s/s or evidence of infection at this time. Plan: Continue to monitor with CBC    #Non-obstructive CAD s/p PCI w/ BMS of LAD in 2008: Stable  Takes ASA 81 mg daily  Plan: Hold ASA 81 mg for tracheostomy; resume per ENT    #Chronic Systolic + Diastolic Heart Failure with EF 60% on 03/18/22, NYHA Class III 2/2 ICM: Stable  GDMT at home: none, started on metoprolol succinate 25 mg daily. Diuretics at home: furosemide. Follows in HF Clinic: No.   Plan:   -Strict I&Os  -Daily weights  -Refer to HF outpatient clinic on discharge if patient is amenable  -Continue metoprolol succinate 25 mg daily    #Acute on Chronic Anxiety: Stable  Started on LD Buspar  Plan: Continue Buspar    Dispo: 4k    Fluids: IVNS @ 75 cc/hr  Electrolyte: Replete as needed  Nutrition: NPO  GI: Pepcid    Lines/Tubes: Right forearm PIV 03/31/2022    DVT ppx: SCDs  PT/OT/SLP: PT/OT/SLP for further evaluation and management    Code status: Full Code No additional code details    Subjective     -Pt recovering nicely   -Trach mask in good position  -No acute events overnight  -No fevers, chills, nausea, or vomiting    Initial HPI     Brenda Solis is 73years old female who presented to the hospital for worsening shortness of breath. Past medical history significant with recent hospital admission for glottic stenosis (3/18-23/22), CAD SP PIC, hypertension, diabetes, anxiety.   Patient was recently admitted and managed for glottic stenosis with she underwent suspension microlaryngoscopy with relief of glottic stenosis, airway balloon dilation, and laryngeal steroid injection on 3/22/22. Patient was discharged home on 3/24. Since 3/25, patient has increase in mucus production which patient feeling \"something stuck in her throat\". She has been using Intensive spirometry at home which she recall that from yesterday she couldn't be able to get any air into the spirometry. Patient reported SOB has been worsening which she decided to to to ED. Denied any fever, chill, chest pain, yellow cough without sputum production, recent sick contact.     In the ED, patient O2 sat 93% on room air. Patient received Decadron 8 mg and racemic epi which her SOB has been improved and O2 sat 100% on room air.      Upon visit patient can talk in full conversation without respiratory distress. \" - Dr. Adali Ann, DO     Objective     Vitals:     /86   Pulse 70   Temp 97.7 °F (36.5 °C) (Oral)   Resp 20   Ht 5' 2\" (1.575 m)   Wt 190 lb 8 oz (86.4 kg)   LMP  (LMP Unknown)   SpO2 97%   BMI 34.84 kg/m²     [facility] Settings:                     FiO2 : 28 %     Intake and Output:       Intake/Output Summary (Last 24 hours) at 4/5/2022 0849  Last data filed at 4/5/2022 4030  Gross per 24 hour   Intake 290 ml   Output 2575 ml   Net -2285 ml       Outpatient Medications:     Scheduled Meds:   insulin glargine  40 Units SubCUTAneous Nightly    insulin lispro  10 Units SubCUTAneous TID AC    glipiZIDE  5 mg Oral BID AC    predniSONE  30 mg Oral Daily    insulin lispro  0-18 Units SubCUTAneous TID WC    [Held by provider] insulin lispro  0-9 Units SubCUTAneous Nightly    insulin lispro  0-6 Units SubCUTAneous Nightly    busPIRone  10 mg Oral TID    budesonide  0.5 mg Nebulization BID    sodium chloride flush  5-40 mL IntraVENous 2 times per day    [Held by provider] aspirin  81 mg Oral Daily    famotidine  20 mg Oral BID    [Held by provider] furosemide  40 mg Oral Daily    melatonin  6 mg Oral Nightly    polycarbophil  625 mg Oral Daily    rosuvastatin  10 mg Oral Nightly    guaiFENesin  600 mg Oral BID    metoprolol succinate  25 mg Oral Daily     Continuous Infusions:   sodium chloride      dextrose       PRN Meds:ipratropium-albuterol, sodium chloride flush, sodium chloride, ondansetron **OR** ondansetron, polyethylene glycol, acetaminophen **OR** acetaminophen, racepinephrine HCl, sodium chloride nebulizer, diclofenac sodium, hydrOXYzine, nitroGLYCERIN, senna, glucagon (rDNA), dextrose, glucose, dextrose bolus (hypoglycemia) **OR** dextrose bolus (hypoglycemia)    Physical Exam:     Physical Exam  Constitutional:       General: She is not in acute distress. Appearance: She is obese. She is not ill-appearing. HENT:      Head: Normocephalic and atraumatic. Nose: No congestion or rhinorrhea. Mouth/Throat:      Mouth: Mucous membranes are moist.      Pharynx: Oropharynx is clear. No oropharyngeal exudate or posterior oropharyngeal erythema. Eyes:      General: No scleral icterus. Extraocular Movements: Extraocular movements intact. Pupils: Pupils are equal, round, and reactive to light. Neck:      Trachea: Tracheostomy (good position, no erythema or purulence around incision site.) present. Cardiovascular:      Rate and Rhythm: Normal rate and regular rhythm. Pulses: Normal pulses. Heart sounds: Normal heart sounds. No murmur heard. No friction rub. No gallop. Pulmonary:      Effort: Pulmonary effort is normal.      Breath sounds: No stridor. No wheezing. Abdominal:      General: Abdomen is flat. Palpations: Abdomen is soft. Tenderness: There is no abdominal tenderness. There is no guarding or rebound. Musculoskeletal:         General: Normal range of motion. Right lower leg: No edema. Left lower leg: No edema. Skin:     General: Skin is warm and dry.       Capillary Refill: Capillary refill takes less than 2 seconds. Neurological:      General: No focal deficit present. Mental Status: She is alert and oriented to person, place, and time. Psychiatric:         Mood and Affect: Mood normal.         Behavior: Behavior normal.         Labs:     No results for input(s): WBC, HGB, HCT, PLT, MPV, BASOPCT, EOSPCT, EOSABS, LYMPHOPCT, MONOPCT, MONOSABS, NEUTOPHILPCT, NEUTROABS, IMMGRAN, RDW, NUCRBC, NRBC in the last 72 hours. Invalid input(s): BASOABS,  LYMPHSABS  Recent Labs     04/02/22  1730 04/05/22  0734    137   K 5.2 5.5*    102   CO2 29 26   BUN 26* 28*   CREATININE 0.6 0.7   GLUCOSE 96 181*     No results for input(s): BILITOT, BILIDIR, PROT, ALBUMIN, AST in the last 72 hours. Invalid input(s): ALK, LABALT  No results for input(s): TROPONINI, CKMB in the last 72 hours. Invalid input(s): CKTOT  No results for input(s): CHOL, TRIG, HDL, LDL in the last 72 hours. No results for input(s): SEDRATE, HSCRP in the last 72 hours. No results for input(s): APTT, INR in the last 72 hours. Invalid input(s): PT  Invalid input(s): HGBA1C  No results for input(s): PH, NVH6CWX, PO2, BASE in the last 72 hours. Invalid input(s): SOV6UNO, SO2, INSPIREDO2  No results for input(s): ABO, RH in the last 72 hours. @LABALLVALUEIP(AMYLASE:5)@  No results for input(s): COLORU, PROTUR, UROBILINOGEN, OSMOLALITY, NAUR, KUR, CLUR, AMPHETAMINE, BENZOURQL, CANNABINOIDS, COCAINE, PCP in the last 72 hours. Invalid input(s): Delmon Comings, MJ, GLUCUA, Ulanda Joseph, BILIRUBIN, HGBUA, NITRITEU, LEUKOCTEST, WBCU, RBCU, MUCOUS, SPECGRAVUR, BARBSCRNUR, DOAUR  No results for input(s): TSH, T3FREE, FREET4 in the last 72 hours. Diagnostics:     Imaging:  No results found.     EKG:   No new    Micro:   Patient Infection Status     Infection Onset Added Last Indicated Last Indicated By Review Planned Expiration Resolved Resolved By    None active    Resolved    COVID-19 12/17/21 12/19/21 12/17/21 COVID-19

## 2022-04-06 ENCOUNTER — PROCEDURE VISIT (OUTPATIENT)
Dept: ENT CLINIC | Age: 72
End: 2022-04-06

## 2022-04-06 DIAGNOSIS — R06.00 DYSPNEA AND RESPIRATORY ABNORMALITIES: Primary | ICD-10-CM

## 2022-04-06 DIAGNOSIS — R49.0 HOARSENESS: ICD-10-CM

## 2022-04-06 DIAGNOSIS — R06.89 DYSPNEA AND RESPIRATORY ABNORMALITIES: Primary | ICD-10-CM

## 2022-04-06 LAB
ANION GAP SERPL CALCULATED.3IONS-SCNC: 10 MEQ/L (ref 8–16)
BUN BLDV-MCNC: 21 MG/DL (ref 7–22)
CALCIUM SERPL-MCNC: 8.9 MG/DL (ref 8.5–10.5)
CHLORIDE BLD-SCNC: 102 MEQ/L (ref 98–111)
CO2: 22 MEQ/L (ref 23–33)
CREAT SERPL-MCNC: 0.6 MG/DL (ref 0.4–1.2)
ERYTHROCYTE [DISTWIDTH] IN BLOOD BY AUTOMATED COUNT: 12.8 % (ref 11.5–14.5)
ERYTHROCYTE [DISTWIDTH] IN BLOOD BY AUTOMATED COUNT: 43.1 FL (ref 35–45)
GFR SERPL CREATININE-BSD FRML MDRD: > 90 ML/MIN/1.73M2
GLUCOSE BLD-MCNC: 165 MG/DL (ref 70–108)
GLUCOSE BLD-MCNC: 171 MG/DL (ref 70–108)
GLUCOSE BLD-MCNC: 284 MG/DL (ref 70–108)
GLUCOSE BLD-MCNC: 357 MG/DL (ref 70–108)
GLUCOSE BLD-MCNC: 99 MG/DL (ref 70–108)
HCT VFR BLD CALC: 40.1 % (ref 37–47)
HEMOGLOBIN: 12.9 GM/DL (ref 12–16)
MAGNESIUM: 2 MG/DL (ref 1.6–2.4)
MCH RBC QN AUTO: 29.5 PG (ref 26–33)
MCHC RBC AUTO-ENTMCNC: 32.2 GM/DL (ref 32.2–35.5)
MCV RBC AUTO: 91.8 FL (ref 81–99)
PHOSPHORUS: 3.1 MG/DL (ref 2.4–4.7)
PLATELET # BLD: 159 THOU/MM3 (ref 130–400)
PMV BLD AUTO: 10.5 FL (ref 9.4–12.4)
POTASSIUM REFLEX MAGNESIUM: 4.4 MEQ/L (ref 3.5–5.2)
RBC # BLD: 4.37 MILL/MM3 (ref 4.2–5.4)
SODIUM BLD-SCNC: 134 MEQ/L (ref 135–145)
WBC # BLD: 17.4 THOU/MM3 (ref 4.8–10.8)

## 2022-04-06 PROCEDURE — 36415 COLL VENOUS BLD VENIPUNCTURE: CPT

## 2022-04-06 PROCEDURE — 6370000000 HC RX 637 (ALT 250 FOR IP): Performed by: STUDENT IN AN ORGANIZED HEALTH CARE EDUCATION/TRAINING PROGRAM

## 2022-04-06 PROCEDURE — 6370000000 HC RX 637 (ALT 250 FOR IP): Performed by: INTERNAL MEDICINE

## 2022-04-06 PROCEDURE — 31615 TRCHEOBRNCHSC EST TRACHS INC: CPT | Performed by: OTOLARYNGOLOGY

## 2022-04-06 PROCEDURE — 85027 COMPLETE CBC AUTOMATED: CPT

## 2022-04-06 PROCEDURE — 31502 CHANGE OF WINDPIPE AIRWAY: CPT | Performed by: OTOLARYNGOLOGY

## 2022-04-06 PROCEDURE — 2060000000 HC ICU INTERMEDIATE R&B

## 2022-04-06 PROCEDURE — 2700000000 HC OXYGEN THERAPY PER DAY

## 2022-04-06 PROCEDURE — 84100 ASSAY OF PHOSPHORUS: CPT

## 2022-04-06 PROCEDURE — 80048 BASIC METABOLIC PNL TOTAL CA: CPT

## 2022-04-06 PROCEDURE — 99233 SBSQ HOSP IP/OBS HIGH 50: CPT | Performed by: HOSPITALIST

## 2022-04-06 PROCEDURE — 6360000002 HC RX W HCPCS: Performed by: NURSE PRACTITIONER

## 2022-04-06 PROCEDURE — 2580000003 HC RX 258: Performed by: STUDENT IN AN ORGANIZED HEALTH CARE EDUCATION/TRAINING PROGRAM

## 2022-04-06 PROCEDURE — 94760 N-INVAS EAR/PLS OXIMETRY 1: CPT

## 2022-04-06 PROCEDURE — 0BJ08ZZ INSPECTION OF TRACHEOBRONCHIAL TREE, VIA NATURAL OR ARTIFICIAL OPENING ENDOSCOPIC: ICD-10-PCS | Performed by: OTOLARYNGOLOGY

## 2022-04-06 PROCEDURE — 6370000000 HC RX 637 (ALT 250 FOR IP)

## 2022-04-06 PROCEDURE — 6370000000 HC RX 637 (ALT 250 FOR IP): Performed by: NURSE PRACTITIONER

## 2022-04-06 PROCEDURE — 94640 AIRWAY INHALATION TREATMENT: CPT

## 2022-04-06 PROCEDURE — 82948 REAGENT STRIP/BLOOD GLUCOSE: CPT

## 2022-04-06 PROCEDURE — 83735 ASSAY OF MAGNESIUM: CPT

## 2022-04-06 RX ADMIN — INSULIN LISPRO 10 UNITS: 100 INJECTION, SOLUTION INTRAVENOUS; SUBCUTANEOUS at 17:06

## 2022-04-06 RX ADMIN — BUDESONIDE 500 MCG: 0.5 INHALANT RESPIRATORY (INHALATION) at 07:40

## 2022-04-06 RX ADMIN — METOPROLOL SUCCINATE 25 MG: 25 TABLET, FILM COATED, EXTENDED RELEASE ORAL at 08:37

## 2022-04-06 RX ADMIN — CALCIUM POLYCARBOPHIL 625 MG: 625 TABLET, FILM COATED ORAL at 08:37

## 2022-04-06 RX ADMIN — BUSPIRONE HYDROCHLORIDE 10 MG: 10 TABLET ORAL at 20:27

## 2022-04-06 RX ADMIN — FAMOTIDINE 20 MG: 20 TABLET ORAL at 08:37

## 2022-04-06 RX ADMIN — PREDNISONE 30 MG: 20 TABLET ORAL at 08:38

## 2022-04-06 RX ADMIN — Medication 6 MG: at 20:26

## 2022-04-06 RX ADMIN — GLIPIZIDE 5 MG: 5 TABLET ORAL at 16:35

## 2022-04-06 RX ADMIN — FAMOTIDINE 20 MG: 20 TABLET ORAL at 20:27

## 2022-04-06 RX ADMIN — ROSUVASTATIN CALCIUM 10 MG: 10 TABLET, FILM COATED ORAL at 20:27

## 2022-04-06 RX ADMIN — INSULIN LISPRO 9 UNITS: 100 INJECTION, SOLUTION INTRAVENOUS; SUBCUTANEOUS at 17:07

## 2022-04-06 RX ADMIN — GLIPIZIDE 5 MG: 5 TABLET ORAL at 08:37

## 2022-04-06 RX ADMIN — BUSPIRONE HYDROCHLORIDE 10 MG: 10 TABLET ORAL at 14:11

## 2022-04-06 RX ADMIN — INSULIN LISPRO 10 UNITS: 100 INJECTION, SOLUTION INTRAVENOUS; SUBCUTANEOUS at 12:39

## 2022-04-06 RX ADMIN — SODIUM CHLORIDE, PRESERVATIVE FREE 10 ML: 5 INJECTION INTRAVENOUS at 08:39

## 2022-04-06 RX ADMIN — GUAIFENESIN 600 MG: 600 TABLET, EXTENDED RELEASE ORAL at 08:37

## 2022-04-06 RX ADMIN — BUSPIRONE HYDROCHLORIDE 10 MG: 10 TABLET ORAL at 08:37

## 2022-04-06 RX ADMIN — INSULIN LISPRO 3 UNITS: 100 INJECTION, SOLUTION INTRAVENOUS; SUBCUTANEOUS at 12:41

## 2022-04-06 RX ADMIN — SODIUM CHLORIDE, PRESERVATIVE FREE 10 ML: 5 INJECTION INTRAVENOUS at 20:26

## 2022-04-06 RX ADMIN — BUDESONIDE 500 MCG: 0.5 INHALANT RESPIRATORY (INHALATION) at 17:07

## 2022-04-06 RX ADMIN — INSULIN GLARGINE 30 UNITS: 100 INJECTION, SOLUTION SUBCUTANEOUS at 20:24

## 2022-04-06 RX ADMIN — INSULIN LISPRO 5 UNITS: 100 INJECTION, SOLUTION INTRAVENOUS; SUBCUTANEOUS at 20:24

## 2022-04-06 RX ADMIN — GUAIFENESIN 600 MG: 600 TABLET, EXTENDED RELEASE ORAL at 20:27

## 2022-04-06 ASSESSMENT — PAIN SCALES - GENERAL
PAINLEVEL_OUTOF10: 0

## 2022-04-06 NOTE — PROGRESS NOTES
Type and Reason for Visit:    Initial,RD Nutrition Re-Screen/LOS     Nutrition Recommendations/Plan:   Continue current diet. Will monitor po, weights, pt. Status vs. Need for nutrition interventions. Nutrition Assessment:      Pt. At low nutrition risk per RD evaluation. Pt. Consuming % of meals. Denies any trouble with chewing/swallowng. Weight appears stable. S/p trach 4/1. PMHx: CAD, COVID, DM      Malnutrition Assessment:  No Malnutrition      Wounds:    None     Current Nutrition Therapies:    ADULT DIET; Regular; 3 carb choices (45 gm/meal)     Anthropometric Measures:  · Height:    5' 2\" (157.5 cm)  · Current Body Weight:   190 lb 8 oz (86.4 kg) (4/5 no edema)  · Admission Body Weight:    191 lb (86.6 kg) (3/27 no edema)  · Usual Body Weight:      (per EMR: 12/18/21: 190# 12.8 oz, 3/18/22: 190# 11.2 oz)  · Ideal Body Weight:     110 lbs   · BMI:   34.8  · BMI Categories:    Obese Class 1 (BMI 30.0-34. 9)     Nutrition Diagnosis:   No nutrition diagnosis at this time      Nutrition Interventions:   Food and/or Nutrient Delivery:    Continue Current Diet  Nutrition Education/Counseling:    Education initiated (4/6 Encouraged po, good nutrition at best efforts.)  Coordination of Nutrition Care:   Continue to monitor while inpatient     Nutrition Monitoring and Evaluation:   Will monitor nutritional needs during LOS.        Discharge Planning:    RD following    Jeannette Gamble RD, LD:    Contact Number: [phone]

## 2022-04-06 NOTE — PROCEDURES
Too Suero is a 73 y.o. female patient. 1. Shortness of breath    2. Stridor      Past Medical History:   Diagnosis Date    Coronary artery disease     COVID     Primary hypertension     Type 2 diabetes mellitus ([facility]      Blood pressure 107/60, pulse 85, temperature 98.7 °F (37.1 °C), temperature source Oral, resp. rate 18, height 5' 2\" (1.575 m), weight 190 lb 8 oz (86.4 kg), SpO2 98 %, not currently breastfeeding. Procedures     [address] [address]  Otolaryngology Head and Neck Surgery  Dr. 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 Flexible Fiberoptic Nasolaryngoscopy  2. Transabdominal diagnostic bronchoscopy    Anesthesia: Topical Lidocaine after Afrin vasoconstriction    Consent: taken and witnessed        History of Chief Complaint:   Chief Complaint   Patient presents with    Procedure     patient is here for scope            Stridor post prolonged intubation with granulation tissue and ulceration in cricoarytenoid joint areas    Past Medical History   has a past medical history of Coronary artery disease, COVID, Primary hypertension, and Type 2 diabetes mellitus ([facility] Past Surgical History   has a past surgical history that includes Coronary angioplasty with stent (); Cataract removal (Bilateral, );  section; Hysterectomy, total abdominal; hiatal hernia repair; sinus surgery; Cardiac surgery; eye surgery; laryngoscopy (N/A, 3/22/2022); laryngoscopy (N/A, 3/28/2022); and tracheostomy tube change (N/A, 2022). Medications  Current Medications:   No current facility-administered medications for this visit. No current outpatient medications on file. Facility-Administered Medications Ordered in Other Visits   Medication Dose Route Frequency Provider Last Rate Last Admin    insulin glargine (LANTUS) injection vial 30 Units  30 Units SubCUTAneous Nightly Roslyn Craven MD   30 Units at 04/05/22 2104    insulin lispro (HUMALOG) injection vial 10 Units  10 Units SubCUTAneous TID AC Roslyn Craven MD   10 Units at 04/06/22 1239    glipiZIDE (GLUCOTROL) tablet 5 mg  5 mg Oral BID AC Juana Chi MD   5 mg at 04/06/22 0837    predniSONE (DELTASONE) tablet 30 mg  30 mg Oral Daily Zhanna WYATT Augustin CNP   30 mg at 04/06/22 0838    ipratropium-albuterol (DUONEB) nebulizer solution 1 ampule  1 ampule Inhalation Q4H PRN Alric Bread, DO        insulin lispro (HUMALOG) injection vial 0-18 Units  0-18 Units SubCUTAneous TID WC Alric Bread, DO   3 Units at 04/06/22 1241    [Held by provider] insulin lispro (HUMALOG) injection vial 0-9 Units  0-9 Units SubCUTAneous Nightly Alric Bread, DO        insulin lispro (HUMALOG) injection vial 0-6 Units  0-6 Units SubCUTAneous Nightly Alric Bread, DO   2 Units at 04/05/22 2104    busPIRone (BUSPAR) tablet 10 mg  10 mg Oral TID Tram Collins V, DO   10 mg at 04/06/22 1411    budesonide (PULMICORT) nebulizer suspension 500 mcg  0.5 mg Nebulization BID Zhanna WYATT Augustin CNP   500 mcg at 04/06/22 0740    sodium chloride flush 0.9 % injection 5-40 mL  5-40 mL IntraVENous 2 times per day Tram Dennis V, DO   10 mL at 04/06/22 0839    sodium chloride flush 0.9 % injection 5-40 mL  5-40 mL IntraVENous PRN Martina Benton V, DO        0.9 % sodium chloride infusion  25 mL IntraVENous PRN Martina Benton V, DO        ondansetron (ZOFRAN-ODT) disintegrating tablet 4 mg  4 mg Oral Q8H PRN Martina Benton V, DO        Or    ondansetron (ZOFRAN) injection 4 mg  4 mg IntraVENous Q6H PRN Martina Benton V, DO        polyethylene glycol (GLYCOLAX) packet 17 g  17 g Oral Daily PRN Martina Bhardwaj V, DO        acetaminophen (TYLENOL) tablet 650 mg  650 mg Oral Q6H PRN Bebe Deeds V, DO   650 mg at 04/02/22 0533    Or    acetaminophen (TYLENOL) suppository 650 mg  650 mg Rectal Q6H PRN [facility]        racepinephrine HCl (VAPONEFPRIN) 2.25 % nebulizer solution NEBU 11.25 mg  11.25 mg Nebulization Q4H PRN Martina Benton V, DO        sodium chloride nebulizer 0.9 % solution 3 mL  3 mL Nebulization Q4H PRN [facility] Brien, DO        [Held by provider] aspirin chewable tablet 81 mg  81 mg Oral Daily Martina Benton V, DO        diclofenac sodium (VOLTAREN) 1 % gel 2 g  2 g Topical 4x Daily PRN Martina Benton V, DO        famotidine (PEPCID) tablet 20 mg  20 mg Oral BID Martina Benton V, DO   20 mg at 04/06/22 0837    [Held by provider] furosemide (LASIX) tablet 40 mg  40 mg Oral Daily Martina Benton V, DO   40 mg at 03/31/22 6346    hydrOXYzine (VISTARIL) capsule 25 mg  25 mg Oral 4x Daily PRN Bebe Deeds V, DO   25 mg at 04/04/22 0218    melatonin tablet 6 mg  6 mg Oral Nightly Martina Benton V, DO   6 mg at 04/05/22 2113    nitroGLYCERIN (NITROSTAT) SL tablet 0.4 mg  0.4 mg SubLINGual Q5 Min PRN Martina Benton V, DO        polycarbophil (FIBERCON) tablet 625 mg  625 mg Oral Daily Martina Benton V, DO   625 mg at 04/06/22 0837    rosuvastatin (CRESTOR) tablet 10 mg  10 mg Oral Nightly Martina Benton V, DO   10 mg at 04/05/22 2109    senna (SENOKOT) tablet 8.6 mg  1 tablet Oral BID PRN Bebe Deeds V, DO   8.6 mg at 04/01/22 2027    guaiFENesin (MUCINEX) extended release tablet 600 mg  600 mg Oral BID Martina Benton V, DO   600 mg at 04/06/22 1918    metoprolol succinate (TOPROL XL) extended release tablet 25 mg  25 mg Oral Daily Martina Benton V, DO   25 mg at 04/06/22 0837    glucagon (rDNA) injection 1 mg  1 mg IntraMUSCular PRN Martina Benton V, DO        dextrose 5 % solution  100 mL/hr IntraVENous PRN Martina Benton V, DO        glucose chewable tablet 4 each  4 tablet Oral PRN Martina Benton V, DO        dextrose bolus (hypoglycemia) 10% 125 mL  125 mL IntraVENous PRN Martina Benton V, DO        Or    dextrose bolus (hypoglycemia) 10% 250 mL  250 mL IntraVENous PRN Saulo Gray V, DO         Home Medications:   Prior to Admission medications    Medication Sig Start Date End Date Taking? Authorizing Provider   glipiZIDE (GLUCOTROL) 5 MG tablet Take 1 tablet by mouth 2 times daily (with meals) TAKE AS PREVIOUSLY USED AT HOME 3/23/22   Peter Reveles MD   hydrOXYzine (VISTARIL) 25 MG capsule Take 1 capsule by mouth 4 times daily as needed for Anxiety  Patient not taking: Reported on 3/27/2022 1/27/22   Karen Hanson MD   albuterol sulfate  (90 Base) MCG/ACT inhaler Inhale 2 puffs into the lungs every 6 hours as needed for Wheezing  Patient not taking: Reported on 3/27/2022 1/27/22   Karen Hanson MD   miconazole (MICOTIN) 2 % powder Apply topically 2 times daily.   Patient not taking: Reported on 3/27/2022 1/27/22   Karen Hanson MD   diclofenac sodium (VOLTAREN) 1 % GEL Apply 2 g topically 4 times daily as needed for Pain 1/27/22   Karen Hanson MD   furosemide (LASIX) 40 MG tablet Take 1 tablet by mouth daily  Patient not taking: Reported on 3/27/2022 1/28/22   Karen Hanson MD   docusate sodium (COLACE) 100 MG capsule Take 1 capsule by mouth 2 times daily  Patient not taking: Reported on 3/27/2022 1/27/22   Karen Hanson MD   polycarbophil (FIBERCON) 625 MG tablet Take 1 tablet by mouth daily  Patient not taking: Reported on 3/27/2022 1/28/22   Karen Hanson MD   senna (SENOKOT) 8.6 MG tablet Take 1 tablet by mouth 2 times daily as needed (Constipation)  Patient not taking: Reported on 3/27/2022 1/27/22 5/27/22  Karen Hanson MD   melatonin 3 MG TABS tablet Take 2 tablets by mouth nightly 1/27/22   Karen Hanson MD   famotidine (PEPCID) 20 MG tablet Take 1 tablet by mouth 2 times daily  Patient not taking: Reported on 3/27/2022 1/27/22   Karen Hanson MD   OXYGEN Inhale 2 L/min into the lungs continuous prn (during activities such as walking) 1/27/22   Karen Hanson MD   rosuvastatin (CRESTOR) 10 MG tablet Take 10 mg by mouth daily    Historical Provider, MD   nitroGLYCERIN (NITROSTAT) 0.4 MG SL tablet Place 0.4 mg under the tongue every 5 minutes as needed for Chest pain up to max of 3 total doses. If no relief after 1 dose, call 911. Patient not taking: Reported on 3/27/2022    Historical Provider, MD   aspirin 81 MG chewable tablet Take 81 mg by mouth daily    Historical Provider, MD       Allergies  Metformin and related, Codeine, Meperidine hcl, Sulfa antibiotics, and Sumatriptan    Family History  family history includes Lung Cancer in her father; Parkinson's Disease in her father. Social History  Tobacco use:  reports that she has never smoked. She has never used smokeless tobacco.  Alcohol use:  reports previous alcohol use. Drug use:  reports no history of drug use. Findings:   Laryngoscopy: The patient's larynx was well visualized and both vocal folds were noted to be moving about 50% of normal.  The symmetrical pattern of movement was clear. No signs of recent bleeding could be seen. The patient was able to tolerate finger occlusion of the tracheostomy which was still in place. Tracheostomy change and diagnostic trend stomal bronchoscopy: Following the application of multiple aliquots of atomized 4% lidocaine through the stoma and around the stoma at the neck skin, I was able to gently pull on the cuffed cannula after deflating the bulb fully, to deliver it through the stoma without generating any coughing at all. I then performed the transabdominal bronchoscopy described to follow and then my assistant placed a #6 cuffless Shiley through the neck wound into the trachea accurately placing it within the trachea on the first attempt without any coughing or bleeding.   Passage of the video bronchoscope through the stoma before the tracheostomy was replaced, I was able to look in a retrograde manner at the posterior commissure and confirmed the vocal fold motion as well as the near complete healing of the wound in the cricoarytenoid joint capsule space of the right arytenoid. Both sides were symmetrically mobile. I then looked inferiorly and through the trachea down to the mainstem bronchi and saw no areas of significant erosion bleeding or mucous plug development. The patient tolerated both procedures well. Endoscopy images:  Open glottic aperture    Closed glottic aperture    Retrograde glottic aperture open    Retrograde glottic aperture closed    Infra stomal airway    PE verified tracheostomy placement          IMPRESSIONS:  1. Recovered movement of the patient's glottis to the point that she is likely to tolerate plugging trials  2. Confirmed movement through transdermal endoscopy as well as ruled out any complications related to tracheostomy placement that might impact potential for decannulation  3. Confirm the accuracy of a first tracheostomy change early in the patient's recovery     PLAN:  1. Readmit to floor and begin plugging trials in the morning on continuous pulse oximetry  2. Likely continue with plan for suspension laryngoscopy in order to enable additional injections of Depo-Medrol into cricoarytenoid joint spaces since this is highly likely to be the reason that her cords have recovered movement.                   Bjorn Ceja MD   Electronically signed 4/6/2022 at 3:54 Tata Goetz MD  4/6/2022

## 2022-04-06 NOTE — PROGRESS NOTES
Medicine Progress Note    Patient:  Rey Deluca    Unit/Bed:4K-01/001-A  YOB: 1950  MRN: [id number]   Acct: [de-identified]   PCP: Zach Yi  Date of Admission: 3/27/2022    Hospital Course     Briefly, pt Rey Deluca is a 73 y.o. female with a PMH of essential HTN, uncontrolled NIDDMT2 c/b diabetic neuropathy A1c 9.9% 12/21/21 on glipizide only, H/o COVID-19 infection requiring intubation c/b tracheal stenosis s/p suspension microlayngoscopy w/ relief of glottic stenosis / airway balloon dilation / laryngeal steroid injection 03/22/22, CAD s/p PCI w/ BMS to LAD in 2008 on ASA 81 mg daily, who presented with worsening shortness of breath 03/27/22 and was found to have significant recurrence glottic stenosis. Pt was also found to be hyperglycemic 2/2 initiation of steroids requiring insulin. Tracheostomy 04/01/22 by ENT. 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 Requiring [facility]. Inspiratory and expiratory stidor on auscultation c/w fixed obstruction from glottic stenosis. S/p suspension microlayngoscopy w/ relief of glottic stenosis / airway balloon dilation / laryngeal steroid injection 03/22/22 by Dr. Adam Coronel MD. 6 Shiley Cuffed Tracheostomy placed 04/01/22 by Dr. Adam Coronel MD. 04/06/22 - flexible nasolaryngoscopy and bronchoscopy - recovered movements of glottis tolerating plugging trials.    Plan:   -ENT consulted, appreciate recs     +Continue prednisone 30 mg daily     +Continue humidification per tracheostomy mask     +Continue tracheostomy teaching and encourage self care     +Continue tracheostomy management     +Plan to change to uncuffed tube this week     +Continuous pulse oximetry     +Tracheostomy today; maintenance IV fluids     +Begin plugging trials     +Continue plan for suspension laryngoscopy  -Aspiration precautions  -Acapella as tolerated  -IS as tolerated  -Wean O2 requirement as tolerated    #Uncontrolled NIDDMT2 c/b bilateral neuropathy A1c 9.9 in 12/21/21: Active  Takes glipizide only at home  Diabetic Nephropathy: no screening in chart  Diabetic Retinopathy: no screening in chart  Diabetic Neuropathy: Bilateral LE sensation diminished; burning sensation present, not currently on pharmacotherapy  Plan:  -Continue glipizide to improve insulin resistance in the setting of steroid use  -Continue High Dose SSI Algorithm on 10 units lispro TID  -Continue insulin glargine  -Hypoglycemia protocol  -POCT glucose qAC/HS  -BMP every 72 hours to monitor for electrolyte abnormalities    #Steroid-induced Leukocytosis: Stable  Edith concurrently w/ initiation of steroids. No s/s or evidence of infection at this time. Plan: Continue to monitor with CBC    #Non-obstructive CAD s/p PCI w/ BMS of LAD in 2008: Stable  Takes ASA 81 mg daily  Plan: Hold ASA 81 mg for tracheostomy; resume per ENT    #Chronic Systolic + Diastolic Heart Failure with EF 60% on 03/18/22, NYHA Class III 2/2 ICM: Stable  GDMT at home: none, started on metoprolol succinate 25 mg daily. Diuretics at home: furosemide. Follows in HF Clinic: No.   Plan:   -Strict I&Os  -Daily weights  -Refer to HF outpatient clinic on discharge if patient is amenable  -Continue metoprolol succinate 25 mg daily    #Acute on Chronic Anxiety: Stable  Started on LD Buspar  Plan: Continue Buspar    Dispo: 4k    Fluids: IVNS @ 75 cc/hr  Electrolyte: Replete as needed  Nutrition: NPO  GI: Pepcid    Lines/Tubes: Right forearm PIV 03/31/2022    DVT ppx: SCDs  PT/OT/SLP: PT/OT/SLP for further evaluation and management    Code status: Full Code No additional code details    Subjective     -Pt recovering nicely   -Trach mask in good position  -flexible nasolaryngoscopy today.   -No acute events overnight  -No fevers, chills, nausea, or vomiting    Initial HPI     Bossman Bautista is 73years old female who presented to the hospital for worsening shortness of breath. Past medical history significant with recent hospital admission for glottic stenosis (3/18-23/22), CAD SP PIC, hypertension, diabetes, anxiety. Patient was recently admitted and managed for glottic stenosis with she underwent suspension microlaryngoscopy with relief of glottic stenosis, airway balloon dilation, and laryngeal steroid injection on 3/22/22. Patient was discharged home on 3/24. Since 3/25, patient has increase in mucus production which patient feeling \"something stuck in her throat\". She has been using Intensive spirometry at home which she recall that from yesterday she couldn't be able to get any air into the spirometry. Patient reported SOB has been worsening which she decided to to to ED. Denied any fever, chill, chest pain, yellow cough without sputum production, recent sick contact.     In the ED, patient O2 sat 93% on room air. Patient received Decadron 8 mg and racemic epi which her SOB has been improved and O2 sat 100% on room air.      Upon visit patient can talk in full conversation without respiratory distress. \" - Dr. Joellen Ray, DO     Objective     Vitals:     /60   Pulse 85   Temp 98.7 °F (37.1 °C) (Oral)   Resp 18   Ht 5' 2\" (1.575 m)   Wt 190 lb 8 oz (86.4 kg)   LMP  (LMP Unknown)   SpO2 98%   BMI 34.84 kg/m²     [facility] Settings:                     FiO2 : 28 %     Intake and Output:       Intake/Output Summary (Last 24 hours) at 4/6/2022 1634  Last data filed at 4/6/2022 1328  Gross per 24 hour   Intake 536 ml   Output 650 ml   Net -114 ml       Outpatient Medications:     Scheduled Meds:   insulin glargine  30 Units SubCUTAneous Nightly    insulin lispro  10 Units SubCUTAneous TID AC    glipiZIDE  5 mg Oral BID AC    predniSONE  30 mg Oral Daily    insulin lispro  0-18 Units SubCUTAneous TID WC    [Held by provider] insulin lispro  0-9 Units SubCUTAneous Nightly    insulin lispro  0-6 Units SubCUTAneous Nightly    busPIRone  10 mg Oral TID    budesonide  0.5 mg Nebulization BID    sodium chloride flush  5-40 mL IntraVENous 2 times per day    [Held by provider] aspirin  81 mg Oral Daily    famotidine  20 mg Oral BID    [Held by provider] furosemide  40 mg Oral Daily    melatonin  6 mg Oral Nightly    polycarbophil  625 mg Oral Daily    rosuvastatin  10 mg Oral Nightly    guaiFENesin  600 mg Oral BID    metoprolol succinate  25 mg Oral Daily     Continuous Infusions:   sodium chloride      dextrose       PRN Meds:ipratropium-albuterol, sodium chloride flush, sodium chloride, ondansetron **OR** ondansetron, polyethylene glycol, acetaminophen **OR** acetaminophen, racepinephrine HCl, sodium chloride nebulizer, diclofenac sodium, hydrOXYzine, nitroGLYCERIN, senna, glucagon (rDNA), dextrose, glucose, dextrose bolus (hypoglycemia) **OR** dextrose bolus (hypoglycemia)    Physical Exam:     Physical Exam  Constitutional:       General: She is not in acute distress. Appearance: She is obese. She is not ill-appearing. HENT:      Head: Normocephalic and atraumatic. Nose: No congestion or rhinorrhea. Mouth/Throat:      Mouth: Mucous membranes are moist.      Pharynx: Oropharynx is clear. No oropharyngeal exudate or posterior oropharyngeal erythema. Eyes:      General: No scleral icterus. Extraocular Movements: Extraocular movements intact. Pupils: Pupils are equal, round, and reactive to light. Neck:      Trachea: Tracheostomy (good position, no erythema or purulence around incision site.) present. Cardiovascular:      Rate and Rhythm: Normal rate and regular rhythm. Pulses: Normal pulses. Heart sounds: Normal heart sounds. No murmur heard. No friction rub. No gallop. Pulmonary:      Effort: Pulmonary effort is normal.      Breath sounds: No stridor. No wheezing. Abdominal:      General: Abdomen is flat. Palpations: Abdomen is soft. Tenderness: There is no abdominal tenderness. There is no guarding or rebound. Musculoskeletal:         General: Normal range of motion. Right lower leg: No edema. Left lower leg: No edema. Skin:     General: Skin is warm and dry. Capillary Refill: Capillary refill takes less than 2 seconds. Neurological:      General: No focal deficit present. Mental Status: She is alert and oriented to person, place, and time. Psychiatric:         Mood and Affect: Mood normal.         Behavior: Behavior normal.         Labs:     Recent Labs     04/06/22  0500   WBC 17.4*   HGB 12.9   HCT 40.1      MPV 10.5     Recent Labs     04/05/22  0734 04/06/22  0500    134*   K 5.5* 4.4    102   CO2 26 22*   BUN 28* 21   CREATININE 0.7 0.6   GLUCOSE 181* 165*   PHOS  --  3.1   MG  --  2.0     No results for input(s): BILITOT, BILIDIR, PROT, ALBUMIN, AST in the last 72 hours. Invalid input(s): ALK, LABALT  No results for input(s): TROPONINI, CKMB in the last 72 hours. Invalid input(s): CKTOT  No results for input(s): CHOL, TRIG, HDL, LDL in the last 72 hours. No results for input(s): SEDRATE, HSCRP in the last 72 hours. No results for input(s): APTT, INR in the last 72 hours. Invalid input(s): PT  Invalid input(s): HGBA1C  No results for input(s): PH, HYP1IWO, PO2, BASE in the last 72 hours. Invalid input(s): QKE4JFG, SO2, INSPIREDO2  No results for input(s): ABO, RH in the last 72 hours. @LABALLVALUEIP(AMYLASE:5)@  No results for input(s): COLORU, PROTUR, UROBILINOGEN, OSMOLALITY, NAUR, KUR, CLUR, AMPHETAMINE, BENZOURQL, CANNABINOIDS, COCAINE, PCP in the last 72 hours. Invalid input(s): Bhavya Birkenhead, MJ, GLUCUA, Tracy Masters, BILIRUBIN, HGBUA, NITRITEU, LEUKOCTEST, WBCU, RBCU, MUCOUS, SPECGRAVUR, BARBSCRNUR, DOAUR  No results for input(s): TSH, T3FREE, FREET4 in the last 72 hours. Diagnostics:     Imaging:  No results found.     EKG:   No new    Micro:   Patient Infection Status     Infection Onset Added Last Indicated Last Indicated By Review Planned Expiration Resolved Resolved By    None active    Resolved    COVID-19 12/17/21 12/19/21 12/17/21 COVID-19   01/06/22 Ania Napier RN    +12/17, admit 12/18 - 80%          Path:   N/A    Signed:  Mono Mathur MD  Internal Medicine, PGY-1  4/6/2022  4:34 PM    Staff: Dr. Damion Lemos MD

## 2022-04-06 NOTE — PLAN OF CARE
Problem: Falls - Risk of:  Goal: Will remain free from falls  Description: Will remain free from falls  Outcome: Met This Shift     Problem: Daily Care:  Goal: Daily care needs are met  Description: Daily care needs are met  Outcome: Met This Shift     Problem: Pain:  Goal: Pain level will decrease  Description: Pain level will decrease  Outcome: Met This Shift     Problem: Skin Integrity:  Goal: Skin integrity will stabilize  Description: Skin integrity will stabilize  Outcome: Met This Shift     Problem: Discharge Planning:  Goal: Patients continuum of care needs are met  Description: Patients continuum of care needs are met  Outcome: Met This Shift     Problem: RESPIRATORY  Goal: Clear lung sounds  Description: Clear lung sounds  4/6/2022 1346 by Ella Delgado RCP  Outcome: Ongoing

## 2022-04-06 NOTE — PLAN OF CARE
Problem: OXYGENATION/RESPIRATORY FUNCTION  Goal: Patient will maintain patent airway  4/6/2022 0314 by Shamika Fragoso RCP  Outcome: Ongoing  4/5/2022 1507 by Dilia Smith RN  Outcome: Ongoing  Note: Airway maintained this shift. Problem: RESPIRATORY  Goal: Clear lung sounds  Description: Clear lung sounds  Outcome: Ongoing    Patient remains on 28% trach collar mask, no distress is noted.

## 2022-04-06 NOTE — FLOWSHEET NOTE
[facility] PROGRESS NOTE      Patient: Amna Drew  Room #: 4K-01/001-A            YOB: 1950  Age: 73 y.o. Gender: female            Admit Date & Time: 3/27/2022  7:37 AM    Assessment:      Interventions:  Francisco Campos is sitting up in a chair on 4k, while her  is in her bed working on a computer. She seems to be in good spirits and has a trac. She tried to mouth her conversation and this  was able to understand most of what she is trying to communicate. When I did not know, I just asked her  to explain about the upcoming surgery. Outcomes: Active listening, light hearted by making jokes with them both. Then we had prayer for her upcoming surgery. Plan:    1. She plans to have surgery and then if all goes well, she hopes to go home.      Electronically signed by Cheyanne Smith on 4/6/2022 at 5:25 PM.  3 [facility]  [phone]

## 2022-04-06 NOTE — PROGRESS NOTES
04/06/22 0750   RT Protocol   History Pulmonary Disease 0   Respiratory pattern 0   Breath sounds 0   Cough 1   Indications for Bronchodilator Therapy Decreased or absent breath sounds   Bronchodilator Assessment Score 1

## 2022-04-06 NOTE — PROGRESS NOTES
[address] [address]  Otolaryngology Head and Neck Surgery  Dr. 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 Flexible Fiberoptic Nasolaryngoscopy  2. Transabdominal diagnostic bronchoscopy    Anesthesia: Topical Lidocaine after Afrin vasoconstriction    Consent: taken and witnessed        History of Chief Complaint:   Chief Complaint   Patient presents with    Procedure     patient is here for scope            Stridor post prolonged intubation with granulation tissue and ulceration in cricoarytenoid joint areas    Past Medical History   has a past medical history of Coronary artery disease, COVID, Primary hypertension, and Type 2 diabetes mellitus ([facility] [address] Past Surgical History   has a past surgical history that includes Coronary angioplasty with stent (); Cataract removal (Bilateral, );  section; Hysterectomy, total abdominal; hiatal hernia repair; sinus surgery; Cardiac surgery; eye surgery; laryngoscopy (N/A, 3/22/2022); laryngoscopy (N/A, 3/28/2022); and tracheostomy tube change (N/A, 2022). Medications  Current Medications:   No current facility-administered medications for this visit. No current outpatient medications on file.      Facility-Administered Medications Ordered in Other Visits   Medication Dose Route Frequency Provider Last Rate Last Admin    insulin glargine (LANTUS) injection vial 30 Units  30 Units SubCUTAneous Nightly Janny Bustillo MD   30 Units at 22    insulin lispro (HUMALOG) injection vial 10 Units  10 Units SubCUTAneous TID AC Janny Bustillo MD   10 Units at 22 1239    glipiZIDE (GLUCOTROL) tablet 5 mg  5 mg Oral BID AC Claudene Stabs, MD 5 mg at 04/06/22 0837    predniSONE (DELTASONE) tablet 30 mg  30 mg Oral Daily WYATT Aguero - CNP   30 mg at 04/06/22 0838    ipratropium-albuterol (DUONEB) nebulizer solution 1 ampule  1 ampule Inhalation Q4H PRN Jeremy Stewart, DO        insulin lispro (HUMALOG) injection vial 0-18 Units  0-18 Units SubCUTAneous TID WC Jeremy Stewart, DO   3 Units at 04/06/22 1241    [Held by provider] insulin lispro (HUMALOG) injection vial 0-9 Units  0-9 Units SubCUTAneous Nightly Jeremy Stewart, DO        insulin lispro (HUMALOG) injection vial 0-6 Units  0-6 Units SubCUTAneous Nightly Jeremy Stewart, DO   2 Units at 04/05/22 2104    busPIRone (BUSPAR) tablet 10 mg  10 mg Oral TID Noe Meek V, DO   10 mg at 04/06/22 1411    budesonide (PULMICORT) nebulizer suspension 500 mcg  0.5 mg Nebulization BID WYATT Aguero - CNP   500 mcg at 04/06/22 0740    sodium chloride flush 0.9 % injection 5-40 mL  5-40 mL IntraVENous 2 times per day Noe Meek V, DO   10 mL at 04/06/22 0839    sodium chloride flush 0.9 % injection 5-40 mL  5-40 mL IntraVENous PRN Martina Benton V, DO        0.9 % sodium chloride infusion  25 mL IntraVENous PRN Martina Benton V, DO        ondansetron (ZOFRAN-ODT) disintegrating tablet 4 mg  4 mg Oral Q8H PRN Martina Benton V, DO        Or    ondansetron (ZOFRAN) injection 4 mg  4 mg IntraVENous Q6H PRN Martina Benton V, DO        polyethylene glycol (GLYCOLAX) packet 17 g  17 g Oral Daily PRN Martina Benton V, DO        acetaminophen (TYLENOL) tablet 650 mg  650 mg Oral Q6H PRN Martina Benton V, DO   650 mg at 04/02/22 0533    Or    acetaminophen (TYLENOL) suppository 650 mg  650 mg Rectal Q6H PRN Ancel Free, DO        racepinephrine HCl (VAPONEFPRIN) 2.25 % nebulizer solution NEBU 11.25 mg  11.25 mg Nebulization Q4H PRN Martina Bhardwaj V, DO        sodium chloride nebulizer 0.9 % solution 3 mL  3 mL Nebulization Q4H PRN Martina Bhardwaj V, DO        [Held by provider] aspirin chewable tablet 81 mg  81 mg Oral Daily Ancel Free, DO        diclofenac sodium (VOLTAREN) 1 % gel 2 g  2 g Topical 4x Daily PRN Martina Benton V, DO        famotidine (PEPCID) tablet 20 mg  20 mg Oral BID Martina Benton V, DO   20 mg at 04/06/22 0837    [Held by provider] furosemide (LASIX) tablet 40 mg  40 mg Oral Daily Martina Benton V, DO   40 mg at 03/31/22 8786    hydrOXYzine (VISTARIL) capsule 25 mg  25 mg Oral 4x Daily PRN Annye Meek V, DO   25 mg at 04/04/22 0218    melatonin tablet 6 mg  6 mg Oral Nightly Martina Benton V, DO   6 mg at 04/05/22 2113    nitroGLYCERIN (NITROSTAT) SL tablet 0.4 mg  0.4 mg SubLINGual Q5 Min PRN Martina Benton V, DO        polycarbophil (FIBERCON) tablet 625 mg  625 mg Oral Daily Martina Benton V, DO   625 mg at 04/06/22 1588    rosuvastatin (CRESTOR) tablet 10 mg  10 mg Oral Nightly Martina Benton V, DO   10 mg at 04/05/22 2109    senna (SENOKOT) tablet 8.6 mg  1 tablet Oral BID PRN Annye Meek V, DO   8.6 mg at 04/01/22 2027    guaiFENesin (MUCINEX) extended release tablet 600 mg  600 mg Oral BID Martina Benton V, DO   600 mg at 04/06/22 5310    metoprolol succinate (TOPROL XL) extended release tablet 25 mg  25 mg Oral Daily Martina Benton V, DO   25 mg at 04/06/22 1146    glucagon (rDNA) injection 1 mg  1 mg IntraMUSCular PRN Annye Meek V, DO        dextrose 5 % solution  100 mL/hr IntraVENous PRN Martina Benton V, DO        glucose chewable tablet 4 each  4 tablet Oral PRN Martina Benton V, DO        dextrose bolus (hypoglycemia) 10% 125 mL  125 mL IntraVENous PRN Martina Benton V, DO        Or    dextrose bolus (hypoglycemia) 10% 250 mL  250 mL IntraVENous PRN Annye Meek V, DO         Home Medications:   Prior to Admission medications    Medication Sig Start Date End Date Taking?  Authorizing Provider   glipiZIDE (GLUCOTROL) 5 MG tablet Take 1 tablet by mouth 2 times daily (with meals) TAKE AS PREVIOUSLY USED AT HOME 3/23/22   Priti Jordan MD   hydrOXYzine (VISTARIL) 25 MG capsule Take 1 capsule by mouth 4 times daily as needed for Anxiety  Patient not taking: Reported on 3/27/2022 1/27/22   Andrea Sánchez MD   albuterol sulfate  (90 Base) MCG/ACT inhaler Inhale 2 puffs into the lungs every 6 hours as needed for Wheezing  Patient not taking: Reported on 3/27/2022 1/27/22   Andrea Sánchez MD   miconazole (MICOTIN) 2 % powder Apply topically 2 times daily. Patient not taking: Reported on 3/27/2022 1/27/22   Andrea Sánchez MD   diclofenac sodium (VOLTAREN) 1 % GEL Apply 2 g topically 4 times daily as needed for Pain 1/27/22   Andrea Sánchez MD   furosemide (LASIX) 40 MG tablet Take 1 tablet by mouth daily  Patient not taking: Reported on 3/27/2022 1/28/22   Andrea Sánchez MD   docusate sodium (COLACE) 100 MG capsule Take 1 capsule by mouth 2 times daily  Patient not taking: Reported on 3/27/2022 1/27/22   Andrea Sánchez MD   polycarbophil (FIBERCON) 625 MG tablet Take 1 tablet by mouth daily  Patient not taking: Reported on 3/27/2022 1/28/22   Andrea Sánchez MD   senna (SENOKOT) 8.6 MG tablet Take 1 tablet by mouth 2 times daily as needed (Constipation)  Patient not taking: Reported on 3/27/2022 1/27/22 5/27/22  Andrea Sánchez MD   melatonin 3 MG TABS tablet Take 2 tablets by mouth nightly 1/27/22   Andrea Sánchez MD   famotidine (PEPCID) 20 MG tablet Take 1 tablet by mouth 2 times daily  Patient not taking: Reported on 3/27/2022 1/27/22   Andrea Sánchez MD   OXYGEN Inhale 2 L/min into the lungs continuous prn (during activities such as walking) 1/27/22   Andrea Sánchez MD   rosuvastatin (CRESTOR) 10 MG tablet Take 10 mg by mouth daily    Historical Provider, MD   nitroGLYCERIN (NITROSTAT) 0.4 MG SL tablet Place 0.4 mg under the tongue every 5 minutes as needed for Chest pain up to max of 3 total doses. If no relief after 1 dose, call 911.   Patient not taking: Reported on 3/27/2022    Historical Provider, MD   aspirin 81 MG chewable tablet Take 81 mg by mouth daily    Historical Provider, MD       Allergies  Metformin and related, Codeine, Meperidine hcl, Sulfa antibiotics, and Sumatriptan    Family History  family history includes Lung Cancer in her father; Parkinson's Disease in her father. Social History  Tobacco use:  reports that she has never smoked. She has never used smokeless tobacco.  Alcohol use:  reports previous alcohol use. Drug use:  reports no history of drug use. Findings:   Laryngoscopy: The patient's larynx was well visualized and both vocal folds were noted to be moving about 50% of normal.  The symmetrical pattern of movement was clear. No signs of recent bleeding could be seen. The patient was able to tolerate finger occlusion of the tracheostomy which was still in place. Tracheostomy change and diagnostic trend stomal bronchoscopy: Following the application of multiple aliquots of atomized 4% lidocaine through the stoma and around the stoma at the neck skin, I was able to gently pull on the cuffed cannula after deflating the bulb fully, to deliver it through the stoma without generating any coughing at all. I then performed the transabdominal bronchoscopy described to follow and then my assistant placed a #6 cuffless Shiley through the neck wound into the trachea accurately placing it within the trachea on the first attempt without any coughing or bleeding. Passage of the video bronchoscope through the stoma before the tracheostomy was replaced, I was able to look in a retrograde manner at the posterior commissure and confirmed the vocal fold motion as well as the near complete healing of the wound in the cricoarytenoid joint capsule space of the right arytenoid. Both sides were symmetrically mobile. I then looked inferiorly and through the trachea down to the mainstem bronchi and saw no areas of significant erosion bleeding or mucous plug development. The patient tolerated both procedures well.     Endoscopy images:  Open glottic aperture    Closed glottic aperture    Retrograde glottic aperture open    Retrograde glottic aperture closed    Infra stomal airway    PE verified tracheostomy placement          IMPRESSIONS:  1. Recovered movement of the patient's glottis to the point that she is likely to tolerate plugging trials  2. Confirmed movement through transdermal endoscopy as well as ruled out any complications related to tracheostomy placement that might impact potential for decannulation  3. Confirm the accuracy of a first tracheostomy change early in the patient's recovery     PLAN:  1. Readmit to floor and begin plugging trials in the morning on continuous pulse oximetry  2. Likely continue with plan for suspension laryngoscopy in order to enable additional injections of Depo-Medrol into cricoarytenoid joint spaces since this is highly likely to be the reason that her cords have recovered movement.                   Johnathan Gaxiola MD   Electronically signed 4/6/2022 at 3:54 PM

## 2022-04-06 NOTE — CARE COORDINATION
Collaborative Discharge Planning    Popejoymichelle Suero  :  1950  MRN:  [id number]    ADMIT DATE:  3/27/2022        Procedure   3/28 Suspension Microlaryngoscopy w Balloon Dilation and Jet Ventilaton, Kenalog Injection, Flexibile Nasolaryngoscopypy      Tracheostomy      Office Laryngoscopy      Lateralize Right Vocal Cord and change to uncuffed tracheostomy tube     Discharge Plan Home with family  Plans home  w spouse Ayden Tovar, has [employer] home oxygen 2L and will need new trach/suction/mist supplies (confirmed w Edgar Innocent and they do not need anything else and will arrange w family); prefers [facility] ([facility], ); therapy following; current w [facility]    Update: all [employer] supplies have been delivered to home yesterday, Distribution will deliver #6 uncuffed Shiley (new version that is clear) and #4 Shiley uncuffed trach (new version that is clear w IC for planned smaller trach procedure next week in office); collaborated w Corey Singh RN, Attending        Discharge Milestones and Delays: Clinical status  Glottic Stenosis (see procedures above)      SIGNED:  Supriya Cabrales RN   2022, 8:29 AM

## 2022-04-06 NOTE — PLAN OF CARE
Problem: RESPIRATORY  Goal: Clear lung sounds  Description: Clear lung sounds  4/6/2022 1346 by Vicky Weinberg, RCP  Outcome: Ongoing  4/6/2022 0314 by Andreina Hardy, RCRAND  Outcome: Ongoing   Will continue to provide aerosol trach collar at 28% and 6 lpm for patent airway. Secretions are minimal to scant. SPO2>94%. BBS are clear/diminished.

## 2022-04-06 NOTE — PROGRESS NOTES
300 INFIMET THERAPY MISSED TREATMENT NOTE  [facility] ICU STEPDOWN TELEMETRY 4K  4K-01/001-A      Date: 2022  Patient Name: Nadeem Albrecht        CSN: [id number]   : 1950  (73 y.o.)  Gender: female   Referring Practitioner: Ben Isabel DO  Diagnosis: Shortness of breath         REASON FOR MISSED TREATMENT: OT attempted at this time. Pt declined, wanting to wait for therapy until after scope this AM. Checked in afternoon and pt declined due to fatigue.  Will check back as able

## 2022-04-06 NOTE — PLAN OF CARE
Problem: Falls - Risk of:  Goal: Will remain free from falls  Description: Will remain free from falls  4/5/2022 2236 by Rubia Desai RN  Outcome: Ongoing  Note: Pt free from fall this shift, will continue to monitor    4/5/2022 1507 by Tez Ponce RN  Outcome: Ongoing  Note: Remained free from falls this shift. Safety measures in place Belongings within reach. Problem: Daily Care:  Goal: Daily care needs are met  Description: Daily care needs are met  4/5/2022 2236 by Rubia Desai RN  Outcome: Ongoing  4/5/2022 1507 by Tez Ponce RN  Outcome: Ongoing  Note: Daily care needs are being met. Problem: Pain:  Goal: Pain level will decrease  Description: Pain level will decrease  4/5/2022 2236 by Rubia Desai RN  Outcome: Ongoing  Note: Pain Assessment: 0-10  Pain Level: 0   Patient's Stated Pain Goal: No pain   Is pain goal met at this time? Yes     Non-Pharmaceutical Pain Intervention(s): Repositioned,Rest (prn tylenol)    4/5/2022 1507 by Tez Ponce RN  Outcome: Ongoing  Note:   Pain Assessment: 0-10  Pain Level: 0   Patient's Stated Pain Goal: No pain   Is pain goal met at this time? Yes  Patient repositioned. Problem: Skin Integrity:  Goal: Skin integrity will stabilize  Description: Skin integrity will stabilize  4/5/2022 2236 by Rubia Desai RN  Outcome: Ongoing  Note: No new skin issues this shift, will continue to monitor    4/5/2022 1507 by Tez Ponce RN  Outcome: Ongoing  Note: No new skin issues this shift. Problem: Discharge Planning:  Goal: Patients continuum of care needs are met  Description: Patients continuum of care needs are met  4/5/2022 1507 by Tez Ponce RN  Outcome: Ongoing  Note: Care needs are met this shift. Problem: OXYGENATION/RESPIRATORY FUNCTION  Goal: Patient will maintain patent airway  4/5/2022 1507 by Tez Ponce RN  Outcome: Ongoing  Note: Airway maintained this shift.      Problem: Skin Integrity:  Goal: Absence of new skin breakdown  Description: Absence of new skin breakdown  4/5/2022 1507 by Grace Stokes RN  Outcome: Ongoing  Note: No new skin break down. Problem: RESPIRATORY  Goal: Clear lung sounds  Description: Clear lung sounds  4/5/2022 0916 by Thad Ashley RCP  Outcome: Ongoing  Note: Pulmicort to improve breath sounds. Problem: Metabolic:  Goal: Ability to maintain appropriate glucose levels will improve  Description: Ability to maintain appropriate glucose levels will improve  4/5/2022 1507 by Grace Stokes RN  Outcome: Ongoing       Care plan reviewed with patient. Patient verbalized understanding of the plan of care and contribute to goal setting.

## 2022-04-07 ENCOUNTER — ANESTHESIA EVENT (OUTPATIENT)
Dept: OPERATING ROOM | Age: 72
DRG: 011 | End: 2022-04-07
Payer: MEDICARE

## 2022-04-07 ENCOUNTER — ANESTHESIA (OUTPATIENT)
Dept: OPERATING ROOM | Age: 72
DRG: 011 | End: 2022-04-07
Payer: MEDICARE

## 2022-04-07 VITALS — DIASTOLIC BLOOD PRESSURE: 73 MMHG | OXYGEN SATURATION: 98 % | SYSTOLIC BLOOD PRESSURE: 123 MMHG

## 2022-04-07 LAB
ANION GAP SERPL CALCULATED.3IONS-SCNC: 12 MEQ/L (ref 8–16)
BUN BLDV-MCNC: 23 MG/DL (ref 7–22)
CALCIUM SERPL-MCNC: 9.1 MG/DL (ref 8.5–10.5)
CHLORIDE BLD-SCNC: 102 MEQ/L (ref 98–111)
CO2: 25 MEQ/L (ref 23–33)
CREAT SERPL-MCNC: 0.6 MG/DL (ref 0.4–1.2)
GFR SERPL CREATININE-BSD FRML MDRD: > 90 ML/MIN/1.73M2
GLUCOSE BLD-MCNC: 109 MG/DL (ref 70–108)
GLUCOSE BLD-MCNC: 120 MG/DL (ref 70–108)
GLUCOSE BLD-MCNC: 156 MG/DL (ref 70–108)
GLUCOSE BLD-MCNC: 163 MG/DL (ref 70–108)
GLUCOSE BLD-MCNC: 171 MG/DL (ref 70–108)
POTASSIUM REFLEX MAGNESIUM: 4.6 MEQ/L (ref 3.5–5.2)
SODIUM BLD-SCNC: 139 MEQ/L (ref 135–145)

## 2022-04-07 PROCEDURE — 2700000000 HC OXYGEN THERAPY PER DAY

## 2022-04-07 PROCEDURE — 2060000000 HC ICU INTERMEDIATE R&B

## 2022-04-07 PROCEDURE — 6370000000 HC RX 637 (ALT 250 FOR IP)

## 2022-04-07 PROCEDURE — 6370000000 HC RX 637 (ALT 250 FOR IP): Performed by: NURSE PRACTITIONER

## 2022-04-07 PROCEDURE — 6360000002 HC RX W HCPCS: Performed by: OTOLARYNGOLOGY

## 2022-04-07 PROCEDURE — 6360000002 HC RX W HCPCS: Performed by: NURSE ANESTHETIST, CERTIFIED REGISTERED

## 2022-04-07 PROCEDURE — 6370000000 HC RX 637 (ALT 250 FOR IP): Performed by: STUDENT IN AN ORGANIZED HEALTH CARE EDUCATION/TRAINING PROGRAM

## 2022-04-07 PROCEDURE — 3700000000 HC ANESTHESIA ATTENDED CARE: Performed by: OTOLARYNGOLOGY

## 2022-04-07 PROCEDURE — 99232 SBSQ HOSP IP/OBS MODERATE 35: CPT | Performed by: HOSPITALIST

## 2022-04-07 PROCEDURE — 31541 LARYNSCOP W/TUMR EXC + SCOPE: CPT | Performed by: OTOLARYNGOLOGY

## 2022-04-07 PROCEDURE — 7100000000 HC PACU RECOVERY - FIRST 15 MIN: Performed by: OTOLARYNGOLOGY

## 2022-04-07 PROCEDURE — 94640 AIRWAY INHALATION TREATMENT: CPT

## 2022-04-07 PROCEDURE — 82948 REAGENT STRIP/BLOOD GLUCOSE: CPT

## 2022-04-07 PROCEDURE — 0B21XFZ CHANGE TRACHEOSTOMY DEVICE IN TRACHEA, EXTERNAL APPROACH: ICD-10-PCS | Performed by: OTOLARYNGOLOGY

## 2022-04-07 PROCEDURE — 80048 BASIC METABOLIC PNL TOTAL CA: CPT

## 2022-04-07 PROCEDURE — 2580000003 HC RX 258: Performed by: OTOLARYNGOLOGY

## 2022-04-07 PROCEDURE — 7100000001 HC PACU RECOVERY - ADDTL 15 MIN: Performed by: OTOLARYNGOLOGY

## 2022-04-07 PROCEDURE — 2500000003 HC RX 250 WO HCPCS: Performed by: NURSE ANESTHETIST, CERTIFIED REGISTERED

## 2022-04-07 PROCEDURE — 94760 N-INVAS EAR/PLS OXIMETRY 1: CPT

## 2022-04-07 PROCEDURE — 3700000001 HC ADD 15 MINUTES (ANESTHESIA): Performed by: OTOLARYNGOLOGY

## 2022-04-07 PROCEDURE — 2709999900 HC NON-CHARGEABLE SUPPLY: Performed by: OTOLARYNGOLOGY

## 2022-04-07 PROCEDURE — 6360000002 HC RX W HCPCS: Performed by: NURSE PRACTITIONER

## 2022-04-07 PROCEDURE — A4216 STERILE WATER/SALINE, 10 ML: HCPCS | Performed by: OTOLARYNGOLOGY

## 2022-04-07 PROCEDURE — 97110 THERAPEUTIC EXERCISES: CPT

## 2022-04-07 PROCEDURE — 36415 COLL VENOUS BLD VENIPUNCTURE: CPT

## 2022-04-07 PROCEDURE — 3600000004 HC SURGERY LEVEL 4 BASE: Performed by: OTOLARYNGOLOGY

## 2022-04-07 PROCEDURE — 3600000014 HC SURGERY LEVEL 4 ADDTL 15MIN: Performed by: OTOLARYNGOLOGY

## 2022-04-07 PROCEDURE — 31571 LARYNGOSCOP W/VC INJ + SCOPE: CPT | Performed by: OTOLARYNGOLOGY

## 2022-04-07 PROCEDURE — 2580000003 HC RX 258: Performed by: STUDENT IN AN ORGANIZED HEALTH CARE EDUCATION/TRAINING PROGRAM

## 2022-04-07 PROCEDURE — 97116 GAIT TRAINING THERAPY: CPT

## 2022-04-07 PROCEDURE — 3E0F83Z INTRODUCTION OF ANTI-INFLAMMATORY INTO RESPIRATORY TRACT, VIA NATURAL OR ARTIFICIAL OPENING ENDOSCOPIC: ICD-10-PCS | Performed by: OTOLARYNGOLOGY

## 2022-04-07 PROCEDURE — 0CJS8ZZ INSPECTION OF LARYNX, VIA NATURAL OR ARTIFICIAL OPENING ENDOSCOPIC: ICD-10-PCS | Performed by: OTOLARYNGOLOGY

## 2022-04-07 PROCEDURE — 2580000003 HC RX 258: Performed by: NURSE ANESTHETIST, CERTIFIED REGISTERED

## 2022-04-07 RX ORDER — SODIUM CHLORIDE FOR INHALATION 0.9 %
3 VIAL, NEBULIZER (ML) INHALATION 3 TIMES DAILY
Qty: 270 ML | Refills: 0 | Status: SHIPPED | OUTPATIENT
Start: 2022-04-07 | End: 2022-04-08

## 2022-04-07 RX ORDER — SODIUM CHLORIDE 9 MG/ML
INJECTION, SOLUTION INTRAVENOUS CONTINUOUS PRN
Status: DISCONTINUED | OUTPATIENT
Start: 2022-04-07 | End: 2022-04-07 | Stop reason: SDUPTHER

## 2022-04-07 RX ORDER — METHYLPREDNISOLONE ACETATE 80 MG/ML
INJECTION, SUSPENSION INTRA-ARTICULAR; INTRALESIONAL; INTRAMUSCULAR; SOFT TISSUE PRN
Status: DISCONTINUED | OUTPATIENT
Start: 2022-04-07 | End: 2022-04-07 | Stop reason: ALTCHOICE

## 2022-04-07 RX ORDER — FENTANYL CITRATE 50 UG/ML
50 INJECTION, SOLUTION INTRAMUSCULAR; INTRAVENOUS EVERY 5 MIN PRN
Status: DISCONTINUED | OUTPATIENT
Start: 2022-04-07 | End: 2022-04-07 | Stop reason: HOSPADM

## 2022-04-07 RX ORDER — ONDANSETRON 2 MG/ML
INJECTION INTRAMUSCULAR; INTRAVENOUS PRN
Status: DISCONTINUED | OUTPATIENT
Start: 2022-04-07 | End: 2022-04-07 | Stop reason: SDUPTHER

## 2022-04-07 RX ORDER — DEXAMETHASONE SODIUM PHOSPHATE 10 MG/ML
INJECTION, EMULSION INTRAMUSCULAR; INTRAVENOUS PRN
Status: DISCONTINUED | OUTPATIENT
Start: 2022-04-07 | End: 2022-04-07 | Stop reason: SDUPTHER

## 2022-04-07 RX ORDER — EPINEPHRINE 1 MG/ML
INJECTION, SOLUTION, CONCENTRATE INTRAVENOUS PRN
Status: DISCONTINUED | OUTPATIENT
Start: 2022-04-07 | End: 2022-04-07 | Stop reason: ALTCHOICE

## 2022-04-07 RX ORDER — SODIUM CHLORIDE 0.9 % (FLUSH) 0.9 %
5-40 SYRINGE (ML) INJECTION PRN
Status: DISCONTINUED | OUTPATIENT
Start: 2022-04-07 | End: 2022-04-07 | Stop reason: HOSPADM

## 2022-04-07 RX ORDER — FENTANYL CITRATE 50 UG/ML
25 INJECTION, SOLUTION INTRAMUSCULAR; INTRAVENOUS EVERY 5 MIN PRN
Status: DISCONTINUED | OUTPATIENT
Start: 2022-04-07 | End: 2022-04-07 | Stop reason: HOSPADM

## 2022-04-07 RX ORDER — SODIUM CHLORIDE 0.9 % (FLUSH) 0.9 %
5-40 SYRINGE (ML) INJECTION EVERY 12 HOURS SCHEDULED
Status: DISCONTINUED | OUTPATIENT
Start: 2022-04-07 | End: 2022-04-07 | Stop reason: HOSPADM

## 2022-04-07 RX ORDER — SUCCINYLCHOLINE/SOD CL,ISO/PF 100 MG/5ML
SYRINGE (ML) INTRAVENOUS PRN
Status: DISCONTINUED | OUTPATIENT
Start: 2022-04-07 | End: 2022-04-07 | Stop reason: SDUPTHER

## 2022-04-07 RX ORDER — INSULIN GLARGINE 100 [IU]/ML
10 INJECTION, SOLUTION SUBCUTANEOUS NIGHTLY
Status: DISCONTINUED | OUTPATIENT
Start: 2022-04-07 | End: 2022-04-08 | Stop reason: HOSPADM

## 2022-04-07 RX ORDER — PREDNISONE 10 MG/1
30 TABLET ORAL DAILY
Qty: 42 TABLET | Refills: 0 | Status: SHIPPED | OUTPATIENT
Start: 2022-04-08 | End: 2022-04-08

## 2022-04-07 RX ORDER — SODIUM CHLORIDE 9 MG/ML
INJECTION INTRAVENOUS PRN
Status: DISCONTINUED | OUTPATIENT
Start: 2022-04-07 | End: 2022-04-07 | Stop reason: ALTCHOICE

## 2022-04-07 RX ORDER — FENTANYL CITRATE 50 UG/ML
INJECTION, SOLUTION INTRAMUSCULAR; INTRAVENOUS PRN
Status: DISCONTINUED | OUTPATIENT
Start: 2022-04-07 | End: 2022-04-07 | Stop reason: SDUPTHER

## 2022-04-07 RX ORDER — BUDESONIDE 0.5 MG/2ML
0.5 INHALANT ORAL 2 TIMES DAILY
Qty: 60 EACH | Refills: 3 | Status: SHIPPED | OUTPATIENT
Start: 2022-04-07 | End: 2022-04-08

## 2022-04-07 RX ORDER — ROCURONIUM BROMIDE 10 MG/ML
INJECTION, SOLUTION INTRAVENOUS PRN
Status: DISCONTINUED | OUTPATIENT
Start: 2022-04-07 | End: 2022-04-07 | Stop reason: SDUPTHER

## 2022-04-07 RX ORDER — SODIUM CHLORIDE 9 MG/ML
INJECTION, SOLUTION INTRAVENOUS PRN
Status: DISCONTINUED | OUTPATIENT
Start: 2022-04-07 | End: 2022-04-07 | Stop reason: HOSPADM

## 2022-04-07 RX ORDER — PROPOFOL 10 MG/ML
INJECTION, EMULSION INTRAVENOUS PRN
Status: DISCONTINUED | OUTPATIENT
Start: 2022-04-07 | End: 2022-04-07 | Stop reason: SDUPTHER

## 2022-04-07 RX ADMIN — BUSPIRONE HYDROCHLORIDE 10 MG: 10 TABLET ORAL at 09:04

## 2022-04-07 RX ADMIN — FENTANYL CITRATE 100 MCG: 50 INJECTION, SOLUTION INTRAMUSCULAR; INTRAVENOUS at 17:03

## 2022-04-07 RX ADMIN — PREDNISONE 30 MG: 20 TABLET ORAL at 09:08

## 2022-04-07 RX ADMIN — GUAIFENESIN 600 MG: 600 TABLET, EXTENDED RELEASE ORAL at 20:35

## 2022-04-07 RX ADMIN — ROCURONIUM BROMIDE 40 MG: 50 INJECTION INTRAVENOUS at 17:08

## 2022-04-07 RX ADMIN — ROCURONIUM BROMIDE 20 MG: 50 INJECTION INTRAVENOUS at 17:35

## 2022-04-07 RX ADMIN — ONDANSETRON 4 MG: 2 INJECTION INTRAMUSCULAR; INTRAVENOUS at 17:18

## 2022-04-07 RX ADMIN — SODIUM CHLORIDE: 9 INJECTION, SOLUTION INTRAVENOUS at 16:57

## 2022-04-07 RX ADMIN — SODIUM CHLORIDE, PRESERVATIVE FREE 10 ML: 5 INJECTION INTRAVENOUS at 09:06

## 2022-04-07 RX ADMIN — METOPROLOL SUCCINATE 25 MG: 25 TABLET, FILM COATED, EXTENDED RELEASE ORAL at 09:04

## 2022-04-07 RX ADMIN — Medication 100 MG: at 17:03

## 2022-04-07 RX ADMIN — PROPOFOL 150 MG: 10 INJECTION, EMULSION INTRAVENOUS at 17:03

## 2022-04-07 RX ADMIN — FAMOTIDINE 20 MG: 20 TABLET ORAL at 20:35

## 2022-04-07 RX ADMIN — ROCURONIUM BROMIDE 30 MG: 50 INJECTION INTRAVENOUS at 17:26

## 2022-04-07 RX ADMIN — DEXAMETHASONE SODIUM PHOSPHATE 10 MG: 10 INJECTION, EMULSION INTRAMUSCULAR; INTRAVENOUS at 17:18

## 2022-04-07 RX ADMIN — BUSPIRONE HYDROCHLORIDE 10 MG: 10 TABLET ORAL at 20:35

## 2022-04-07 RX ADMIN — SODIUM CHLORIDE, PRESERVATIVE FREE 10 ML: 5 INJECTION INTRAVENOUS at 20:35

## 2022-04-07 RX ADMIN — ROSUVASTATIN CALCIUM 10 MG: 10 TABLET, FILM COATED ORAL at 20:35

## 2022-04-07 RX ADMIN — BUDESONIDE 500 MCG: 0.5 INHALANT RESPIRATORY (INHALATION) at 07:47

## 2022-04-07 RX ADMIN — GUAIFENESIN 600 MG: 600 TABLET, EXTENDED RELEASE ORAL at 09:04

## 2022-04-07 RX ADMIN — INSULIN GLARGINE 10 UNITS: 100 INJECTION, SOLUTION SUBCUTANEOUS at 20:36

## 2022-04-07 RX ADMIN — SUGAMMADEX 200 MG: 100 INJECTION, SOLUTION INTRAVENOUS at 18:16

## 2022-04-07 RX ADMIN — Medication 6 MG: at 20:35

## 2022-04-07 RX ADMIN — FAMOTIDINE 20 MG: 20 TABLET ORAL at 09:04

## 2022-04-07 ASSESSMENT — PULMONARY FUNCTION TESTS
PIF_VALUE: 3
PIF_VALUE: 11
PIF_VALUE: 1
PIF_VALUE: 28
PIF_VALUE: 27
PIF_VALUE: 2
PIF_VALUE: 27
PIF_VALUE: 27
PIF_VALUE: 1
PIF_VALUE: 17
PIF_VALUE: 27
PIF_VALUE: 28
PIF_VALUE: 28
PIF_VALUE: 29
PIF_VALUE: 2
PIF_VALUE: 25
PIF_VALUE: 1
PIF_VALUE: 27
PIF_VALUE: 29
PIF_VALUE: 0
PIF_VALUE: 28
PIF_VALUE: 28
PIF_VALUE: 27
PIF_VALUE: 24
PIF_VALUE: 29
PIF_VALUE: 17
PIF_VALUE: 5
PIF_VALUE: 25
PIF_VALUE: 27
PIF_VALUE: 28
PIF_VALUE: 27
PIF_VALUE: 27
PIF_VALUE: 25
PIF_VALUE: 27
PIF_VALUE: 1
PIF_VALUE: 27
PIF_VALUE: 29
PIF_VALUE: 29
PIF_VALUE: 1
PIF_VALUE: 27
PIF_VALUE: 28
PIF_VALUE: 27
PIF_VALUE: 28
PIF_VALUE: 24
PIF_VALUE: 28
PIF_VALUE: 28
PIF_VALUE: 29
PIF_VALUE: 3
PIF_VALUE: 1
PIF_VALUE: 27
PIF_VALUE: 0
PIF_VALUE: 27
PIF_VALUE: 25
PIF_VALUE: 1
PIF_VALUE: 2
PIF_VALUE: 27
PIF_VALUE: 25
PIF_VALUE: 28
PIF_VALUE: 1
PIF_VALUE: 28
PIF_VALUE: 27
PIF_VALUE: 29
PIF_VALUE: 17
PIF_VALUE: 26
PIF_VALUE: 27
PIF_VALUE: 27
PIF_VALUE: 19
PIF_VALUE: 27
PIF_VALUE: 29
PIF_VALUE: 28
PIF_VALUE: 27
PIF_VALUE: 26
PIF_VALUE: 26
PIF_VALUE: 29
PIF_VALUE: 29
PIF_VALUE: 1
PIF_VALUE: 28
PIF_VALUE: 27
PIF_VALUE: 3
PIF_VALUE: 27
PIF_VALUE: 28
PIF_VALUE: 27
PIF_VALUE: 1
PIF_VALUE: 28
PIF_VALUE: 27
PIF_VALUE: 1
PIF_VALUE: 0
PIF_VALUE: 28
PIF_VALUE: 3
PIF_VALUE: 28
PIF_VALUE: 28
PIF_VALUE: 27

## 2022-04-07 ASSESSMENT — PAIN SCALES - GENERAL
PAINLEVEL_OUTOF10: 0
PAINLEVEL_OUTOF10: 4

## 2022-04-07 ASSESSMENT — ENCOUNTER SYMPTOMS: SHORTNESS OF BREATH: 1

## 2022-04-07 ASSESSMENT — PAIN DESCRIPTION - PAIN TYPE: TYPE: SURGICAL PAIN

## 2022-04-07 ASSESSMENT — PAIN DESCRIPTION - LOCATION: LOCATION: NECK

## 2022-04-07 NOTE — PROGRESS NOTES
1831: Pt arrives to pacu, awakens on to verbal stimuli. Pt on 8L trach mask. Frequent cough noted, suctioned blood tinged sputum from trach on arrival to pacu, tolerated well. VSS  1845: Pt states pain 4/10 and tolerable  1855: Pt resting off and on with eyes closed, easily awakens to voice. States pain is tolerable  1901: Pt meets criteria for discharge from pacu, awaiting to give report. 1930: Provided report to [address] [place] [address]: Pt transported back to  in stable condition.  VSS

## 2022-04-07 NOTE — PLAN OF CARE
Problem: Discharge Planning:  Goal: Patients continuum of care needs are met  Description: Patients continuum of care needs are met  4/6/2022 1717 by Gorge Garay RN  Outcome: Met This Shift     Problem: Falls - Risk of:  Goal: Will remain free from falls  Description: Will remain free from falls  4/6/2022 2116 by Tiffanie Padilla RN  Outcome: Ongoing  Note: Pt free from fall this shift, will continue to monitor    4/6/2022 1717 by Gorge Garay RN  Outcome: Met This Shift     Problem: Daily Care:  Goal: Daily care needs are met  Description: Daily care needs are met  4/6/2022 2116 by Tiffanie Padilla RN  Outcome: Ongoing  Note: Participated in trach suction and cleaning  4/6/2022 1717 by Gorge Garay RN  Outcome: Met This Shift     Problem: Skin Integrity:  Goal: Skin integrity will stabilize  Description: Skin integrity will stabilize  4/6/2022 2116 by Tiffanie Padilla RN  Outcome: Ongoing  Note: No new skin issues this shift, will continue to monitor    4/6/2022 1717 by Gorge Garay RN  Outcome: Met This Shift     Problem: RESPIRATORY  Goal: Clear lung sounds  Description: Clear lung sounds  4/6/2022 1346 by Hunter Tomlinson RCP  Outcome: Ongoing

## 2022-04-07 NOTE — CARE COORDINATION
Collaborative Discharge Planning    Alonso Sellers  :  1950  MRN:  [id number]    ADMIT DATE:  3/27/2022      Discharge Planning Discharge Planning  Living Arrangements: Spouse/Significant Other  Support Systems: Family Members  [address] Region: [address] [place] [address]  DME: Trach Suction Supplies,Oxygen Therapy (Comment)  Potential Assistance Needed: Home Care  Type of Home Care Services: Nursing Services  Patient expects to be discharged to[de-identified] [facility] Board Notes /Social Work Whiteboard Notes  /Social Work Whiteboard:  SW/CM: [facility]. Has home O2 through [address] trach on . Procedure     3/28 Suspension Microlaryngoscopy w Balloon Dilation and Jet Ventilaton, Kenalog Injection, Flexibile Nasolaryngoscopypy      Tracheostomy      Office Laryngoscopy      Lateralize Right Vocal Cord and change to uncuffed tracheostomy tube    Discharge Plan Home with family  Plans home  w spouse Caryle Ivans, has [employer] home oxygen 2L and will need new trach/suction/mist supplies (confirmed w Lois Geller and they do not need anything else and will arrange w family); prefers [facility] ([facility] [place], ); therapy following; current w [facility]     All [employer] supplies have been delivered to home 2 days ago.  Patient going home w spare #6 uncuffed Shiley (new version that is clear) and #4 Shiley uncuffed trach (new version that is clear w IC for planned smaller trach procedure next week in office); collaborated w Yossi Dumont, Attending, patient, Lois Geller Liaison, [facility], [facility] Liaison    Discharge Milestones and Delays: Clinical status  See procedures above    Update: client using PMV speaking valve and ENT plans new style #6 Shiley uncuffed trach tonight; [facility] will give 1 box IC home w patient; [employer] will deliver supplies to room before 1700 tonight; collaborated w [facility], RN    SIGNED:  Caro Mendieta RN   2022, 7:28 AM    22, 7:33 AM EDT    Patient goals/plan/ treatment preferences discussed by  and . Patient goals/plan/ treatment preferences reviewed with patient/ family. Patient/ family verbalize understanding of discharge plan and are in agreement with goal/plan/treatment preferences. Understanding was demonstrated using the teach back method. AVS provided by RN at time of discharge, which includes all necessary medical information pertaining to the patients current course of illness, treatment, post-discharge goals of care, and treatment preferences.

## 2022-04-07 NOTE — PROGRESS NOTES
[address] THERAPY MISSED TREATMENT NOTE  [facility] ICU STEPDOWN TELEMETRY 4K  4K-01/001-A      Date: 2022  Patient Name: Fauzia Voss        CSN: [id number]   : 1950  (73 y.o.)  Gender: female   Referring Practitioner: Pamela Rizo DO  Diagnosis: Shortness of breath         REASON FOR MISSED TREATMENT: Pt to be discharged home this date. Will allow pt to rest for discharge.

## 2022-04-07 NOTE — OP NOTE
Operative Note      Patient: Quin Russ  YOB: 1950  MRN: [id number]    Date of Procedure: 4/7/2022    Pre-Op Diagnosis: Glottic stenosis; bilateral cricoarytenoid joint ankylosis    Post-Op Diagnosis: Same       Procedure(s):  Suspension Laryngoscopy  Lateral of Right True Vocal Cord, With Steroid Injection of Larynx And Tracheostomy Tube Change    Surgeon(s):  Mehrdad Adams MD    Assistant:   * No surgical staff found *    Anesthesia: General    Estimated Blood Loss (mL): Minimal    Complications: None    Specimens:   * No specimens in log *    Implants:  * No implants in log *      Drains: * No LDAs found *    Findings: 1. Posterior cricoarytenoid joint granulation tissue on right side  2. Arytenoid cartilage visible bilaterally    Detailed Description of Procedure: The patient was taken the operating room awake and placed in the supine position. General anesthesia was induced and the patient tracheostomy cannula was replaced with an endotracheal tube on the first attempt without difficulty or vital sign instability. The table was turned 90 degrees for the procedure. A timeout was employed verifying the patient's identity and planned procedure. Suspension microlaryngoscopy with granulation tissue debridement and cricoarytenoid joint steroid injections: After crafting a custom made heat activated dental protection device for her maxillary teeth, I placed on her teeth and passed a Constantin through the left lingual cul-de-sac into the supraglottis. I had a difficult time getting a good view of the glottis through this approach so I opted to let the epiglottis go posterior to the scope and I used a 30 degree optical telescope to visualize the airway. In this manner was able to enter the endolarynx and could see that the cricoarytenoid joint spaces are both ulcerated with exposed cartilage on both sides, right worse than the left.   There was granulation tissue about the right side which I resected using a 4 mm angled rotating Tricut blade debrider system. I then injected 1 cc per side of 80 mg/cc Depo-Medrol into the soft tissues of the cricoarytenoid joint and the surrounding tissue spaces. Bleeding was self-limited. I suctioned away the excess and consider the operation concluded. I removed the transoral hardware and then change the patient over to a fresh #6 cuffless Shiley cannula when she was breathing on her own. The patient was then reversed from general anesthesia and taken to recovery in satisfactory condition. Estimated blood loss in the case was less than 10 cc and the patient received less than a liter of intravenous lactated Ringer's intraoperatively. No blood products were transfused. No intraoperative complications were detected. Counts were considered accurate x3. I was present for and performed the patient's entire operation. Disposition: The patient be remitted admitted to the floor for postop observation and will be prepared for discharged home sometime in the next 24 to 48 hours.     Electronically signed by Darby Teresa MD on 4/7/2022 at 6:36 PM

## 2022-04-07 NOTE — PROGRESS NOTES
Did trach care education with pt. Pt was hesitant and nervous about trying to doing suctioning herself but after discussion she agreed to do it with me to practice. Pt used mirror on bedside table to visualize, was able to suction a small amount of secretions and stated she felt more comfortable after walking through it and would continue to practice with staff as she needs to be suctioned.  was able to do it with staff teaching during day shift and she is comfortable with him doing it. Educated about the need to be able to do it herself just in case there is a time when he is not around and able to help her.

## 2022-04-07 NOTE — DISCHARGE SUMMARY
Hospital Medicine Discharge Summary      Patient Identification:   Tato Betancourt   : 1950  MRN: [id number]   Account: [de-identified]      Patient's PCP: Khadar Robertson Date: 3/27/2022     Discharge Date:   22    Admitting Physician: Jhonatan Kaiser MD     Discharge Physician: Shameka Palacios MD       Hospital Course:   Tato Betnacourt is a 73 y.o. female admitted to [address] [place] [address] on 3/27/2022 w/ PMH of essential HTN, uncontrolled NIDDMT2 c/b diabetic neuropathy A1c 9.9% 21 on glipizide only, H/o COVID-19 infection requiring intubation c/b tracheal stenosis s/p suspension microlayngoscopy w/ relief of glottic stenosis / airway balloon dilation / laryngeal steroid injection 22, CAD s/p PCI w/ BMS to LAD in  on ASA 81 mg daily, who presented with worsening shortness of breath 22 and was found to have significant recurrence glottic stenosis. Pt was also found to be hyperglycemic 2/2 initiation of steroids requiring insulin. Tracheostomy 22 by ENT. 22 - flexible nasolaryngoscopy and bronchoscopy with Dr. Mona Oreilly - recovered movements of glottis tolerating plugging trials. 22 - suspension laryngoscopy, DepoMedrol 80 mg/cc injection into cricoarytenoid joint soft tissue. Pt continues to refuse insulin therapy once discharged therefore, we will increase her glipizide dosing to 10 daily and have her follow up w/ PCP in 1 week. The patient was stable for discharge - all consultants were contacted and in agreement with plan for discharge. Appropriate follow up appointment was arranged prior to discharge. Please see below or view chart for more details from hospital course. Discharge Diagnoses:    #Acute on Chronic Hypoxic Respiratory Failure 2/2 glottic stenosis: Active  2L NC baseline. Requiring [address]. Inspiratory and expiratory stidor on auscultation c/w fixed obstruction from glottic stenosis.  S/p suspension microlayngoscopy w/ relief of glottic stenosis / airway balloon dilation / laryngeal steroid injection 03/22/22 by Dr. Geena Conroy MD. 6 Shiley Cuffed Tracheostomy placed 04/01/22 by Dr. Geena Conroy MD. 04/06/22 - flexible nasolaryngoscopy and bronchoscopy with Dr. Bambi Sterling - recovered movements of glottis tolerating plugging trials. Pt was given information on appropriate tracheostomy self care and management. Pt will follow up w/ ENT outpatient. 04/07/22 - suspension laryngoscopy, DepoMedrol 80 mg/cc injection into cricoarytenoid joint soft tissue.     #Uncontrolled NIDDMT2 c/b bilateral neuropathy A1c 9.9 in 12/21/21: Active  Takes glipizide only at home  Diabetic Nephropathy: no screening in chart  Diabetic Retinopathy: no screening in chart  Diabetic Neuropathy: Bilateral LE sensation diminished; burning sensation present, not currently on pharmacotherapy  Plan: Pt continues to refuse insulin therapy at discharge. Will be discharged on glipizide 10 mg daily without insulin.     #Steroid-induced Leukocytosis: Stable  Edith concurrently w/ initiation of steroids. No s/s or evidence of infection at this time.      #Non-obstructive CAD s/p PCI w/ BMS of LAD in 2008: Stable  Takes ASA 81 mg daily. Restarted on discharge.      #Chronic Systolic + Diastolic Heart Failure with EF 60% on 03/18/22, NYHA Class III 2/2 ICM: Stable  GDMT at home: none, started on metoprolol succinate 25 mg daily. Diuretics at home: furosemide. Follows in HF Clinic: No.   Plan:   -Refer to HF outpatient clinic on discharge if patient is amenable  -Continue metoprolol succinate 25 mg daily     #Acute on Chronic Anxiety: Stable  Started on LD Buspar  Plan: Continue Buspar    The patient was seen and examined on day of discharge and this discharge summary is in conjunction with any daily progress note from day of discharge.     Exam:     Vitals:  Vitals:    04/07/22 0418 04/07/22 0730 04/07/22 0747 04/07/22 1045   BP: 138/78 129/75  118/78   Pulse: 72 70  66 Resp: 20 18  16   Temp: 98 °F (36.7 °C) 97.6 °F (36.4 °C)  98.5 °F (36.9 °C)   TempSrc: Oral Oral  Oral   SpO2: 100% 100% 100% 100%   Weight:       Height:         Weight: Weight: 190 lb 8 oz (86.4 kg)     24 hour intake/output:    Intake/Output Summary (Last 24 hours) at 4/7/2022 1127  Last data filed at 4/6/2022 1420  Gross per 24 hour   Intake 300 ml   Output 300 ml   Net 0 ml     Constitutional:       General: She is not in acute distress. Appearance: She is obese. She is not ill-appearing. HENT:      Head: Normocephalic and atraumatic. Nose: No congestion or rhinorrhea. Mouth/Throat:      Mouth: Mucous membranes are moist.      Pharynx: Oropharynx is clear. No oropharyngeal exudate or posterior oropharyngeal erythema. Eyes:      General: No scleral icterus. Extraocular Movements: Extraocular movements intact. Pupils: Pupils are equal, round, and reactive to light. Neck:      Trachea: Tracheostomy (good position, no erythema or purulence around incision site.) present. Cardiovascular:      Rate and Rhythm: Normal rate and regular rhythm. Pulses: Normal pulses. Heart sounds: Normal heart sounds. No murmur heard. No friction rub. No gallop. Pulmonary:      Effort: Pulmonary effort is normal.      Breath sounds: No stridor. No wheezing. Abdominal:      General: Abdomen is flat. Palpations: Abdomen is soft. Tenderness: There is no abdominal tenderness. There is no guarding or rebound. Musculoskeletal:         General: Normal range of motion. Right lower leg: No edema. Left lower leg: No edema. Skin:     General: Skin is warm and dry. Capillary Refill: Capillary refill takes less than 2 seconds. Neurological:      General: No focal deficit present. Mental Status: She is alert and oriented to person, place, and time. Psychiatric:         Mood and Affect: Mood normal.         Behavior: Behavior normal.     Labs:  For convenience and continuity at follow-up the following most recent labs are provided:      CBC:    Lab Results   Component Value Date    WBC 17.4 04/06/2022    HGB 12.9 04/06/2022    HCT 40.1 04/06/2022     04/06/2022       Renal:    Lab Results   Component Value Date     04/07/2022    K 4.6 04/07/2022     04/07/2022    CO2 25 04/07/2022    BUN 23 04/07/2022    CREATININE 0.6 04/07/2022    CALCIUM 9.1 04/07/2022    PHOS 3.1 04/06/2022         Significant Diagnostic Studies    Radiology:   XR CHEST PORTABLE   Final Result   Stable radiographic appearance of the chest. No evidence of an acute process. **This report has been created using voice recognition software. It may contain minor errors which are inherent in voice recognition technology. **      Final report electronically signed by Dr. Gary Davis MD on 3/28/2022 2:10 PM      XR CHEST (2 VW)   Final Result   Stable radiographic appearance of the chest. No interval change since previous study dated 22 March 2022. Halima Giraldo **This report has been created using voice recognition software. It may contain minor errors which are inherent in voice recognition technology. **      Final report electronically signed by DR Eris Fraser on 3/27/2022 9:26 AM      XR NECK SOFT TISSUE   Final Result         1. The visualized airway is normal.   2. If the patient has persistent symptoms, CT scan may be helpful for better evaluation. .               **This report has been created using voice recognition software. It may contain minor errors which are inherent in voice recognition technology. **      Final report electronically signed by DR Eris Fraser on 3/27/2022 9:29 AM           Consults:     IP CONSULT TO OTOLARYNGOLOGY  IP CONSULT TO SOCIAL WORK  IP CONSULT TO HOME CARE NEEDS  IP CONSULT TO PHARMACY    Disposition:    [x] Home w/ home care needs       [] TCU       [] Rehab       [] Psych       [] SNF       [] [place]       [] Other-    Condition at Discharge: Stable    Code Status:  Full Code     Patient Instructions:    Discharge lab work: CBC, BMP, Mg in 1 week, f/u w/ PCP  Activity: activity as tolerated  Diet: Diet NPO      Follow-up visits:   CM [facility]  [address]  [place] [id number]  [phone]    As needed for nursing, [facility] , home trach suctioning/teaching    [address] [facility]  [address] [facility]  [address] [address] [place]  [phone]    Schedule an appointment as soon as possible for a visit      [address] [facility]  [phone]      As needed for trach supplies         Discharge Medications:        Medication List      ASK your doctor about these medications    albuterol sulfate  (90 Base) MCG/ACT inhaler  Inhale 2 puffs into the lungs every 6 hours as needed for Wheezing     aspirin 81 MG chewable tablet     diclofenac sodium 1 % Gel  Commonly known as: VOLTAREN  Apply 2 g topically 4 times daily as needed for Pain     docusate sodium 100 MG capsule  Commonly known as: COLACE  Take 1 capsule by mouth 2 times daily     famotidine 20 MG tablet  Commonly known as: PEPCID  Take 1 tablet by mouth 2 times daily     furosemide 40 MG tablet  Commonly known as: LASIX  Take 1 tablet by mouth daily     glipiZIDE 5 MG tablet  Commonly known as: GLUCOTROL  Take 1 tablet by mouth 2 times daily (with meals) TAKE AS PREVIOUSLY USED AT HOME     hydrOXYzine 25 MG capsule  Commonly known as: VISTARIL  Take 1 capsule by mouth 4 times daily as needed for Anxiety     melatonin 3 MG Tabs tablet  Take 2 tablets by mouth nightly     miconazole 2 % powder  Commonly known as: MICOTIN  Apply topically 2 times daily.      nitroGLYCERIN 0.4 MG SL tablet  Commonly known as: NITROSTAT     OXYGEN  Inhale 2 L/min into the lungs continuous prn (during activities such as walking) polycarbophil 625 MG tablet  Commonly known as: FIBERCON  Take 1 tablet by mouth daily     predniSONE 10 MG tablet  Commonly known as: DELTASONE  Take 4 tablets by mouth daily for 3 days, THEN 2 tablets daily for 3 days, THEN 1 tablet daily for 3 days, THEN 0.5 tablets daily for 3 days. Start taking on: March 23, 2022  Ask about: Should I take this medication?     rosuvastatin 10 MG tablet  Commonly known as: CRESTOR     senna 8.6 MG tablet  Commonly known as: SENOKOT  Take 1 tablet by mouth 2 times daily as needed (Constipation)            Time Spent on discharge is roughly 35 minutes in the examination, evaluation, counseling and review of medications and discharge plan. Thank you Galileo Quiroz for the opportunity to be involved in this patient's care.     Signed:    Electronically signed by Faheem Davenport MD on 4/7/2022 at 11:27 AM

## 2022-04-07 NOTE — PLAN OF CARE
Problem: RESPIRATORY  Goal: Clear lung sounds  Description: Clear lung sounds  Outcome: Ongoing  Note: Pulmicort to improve breath sounds.

## 2022-04-07 NOTE — CARE COORDINATION
4/7/22, 10:01 AM EDT    Patient goals/plan/ treatment preferences discussed by  and . Patient goals/plan/ treatment preferences reviewed with patient/ family. Patient/ family verbalize understanding of discharge plan and are in agreement with goal/plan/treatment preferences. Understanding was demonstrated using the teach back method. AVS provided by RN at time of discharge, which includes all necessary medical information pertaining to the patients current course of illness, treatment, post-discharge goals of care, and treatment preferences. Services After Discharge  Services At/After Discharge: Nursing Services,[facility])   IMM Letter  IMM Letter given to Patient/Family/Significant other/Guardian/POA/by[de-identified] Pt Access  IMM Letter date given[de-identified] 03/27/22  IMM Letter time given[de-identified] 2050       Patient to discharge today with [facility] and family. SW notified [facility] of discharge today. RN made aware.

## 2022-04-07 NOTE — PROGRESS NOTES
Respiratory Care Artificial Airway Evaluation    The patient's airway is not older than seven days. The patient's airway does not have panic sutures. Patient experiencing no problems.     Type of airway:  [x] Tracheostomy  [] Larngectomy    Size 6  Type  []Anup adult flexible tracheostomy tube with taperguard cuff   []Shiley disposable cannula cuffed tracheostomy tube (DCT)   []Anup diposable cannula fenestrated cuffed tracheostomy tube (DFEN)  []Shiley disposable cannula cuffed percutaneous tracheostomy tube (PERC)  [x]Shiley disposable cannula cuffless tracheostomy tube (DCFS)  [] Shiley disposable cannula cuffless fenestrated tracheostomy tube [facility])  []Valeryley extended length cuffless tracheostomy tube  []Valeryley extended length cuffed tracheostomy tube  []Valeryley laryngectomy tube (LGT)  []Valeryley single cannula cuffed tracheostomy tube (SCT)  []Portex Bivona silicone tracheostomy tube  []Other:    Length  [x]standard  []99 mm  []120 mm  []Other:

## 2022-04-07 NOTE — PROGRESS NOTES
[address] [address]  INPATIENT PHYSICAL THERAPY  DAILY NOTE  STRZ ICU STEPDOWN TELEMETRY 4K - [id number]     Time In: 4999  Time Out: 0902  Timed Code Treatment Minutes: 33 Minutes  Minutes: 33          Date: 2022  Patient Name: Max Dose,  Gender:  female        MRN: [id number]  : 1950  (73 y.o.)     Referring Practitioner: Carlton Tesfaye DO  Diagnosis: SOB  Additional Pertinent Hx: The patient is a 73 y.o. female who was admitted to [address] [place] [address] late last evening. She is known to our service - s/p suspension microlaryngosocpy with balloon dilation and laryngeal steroid injection secondary to glottic stenosis 3/22/2022 with Dr José Miguel Louis and Dr Aydee Dubose. Patient was discharged from hospital on 3/23/2022 with significant improvement in her breathing. Patient reports that she continues on supplemental oxygen at home. She reports progressive shortness of breath and stridor over the weekend. She states that she truly felt she could not breathe and was not sure if she would live while traveling to the ER last evening. Patient has history of hospitalization here at [address] [place] [address] from 2021-2022 for severe COVID-19 infection with respiratory failure. She failed HFNC and BiPAP progressing to need for endotracheal intubation on 2021 secondary to severe ARDS. She was extubated on 2022. Patient reports hoarse voice following extubation. She was transferred to inpatient rehab from following acute hospital stay. She was discharged on supplemental oxygen. She had ER visit on 3/9/2022 for shortness of breath, then presented again on 3/18/2022 for shortness of breath with admission. Pt is s/p SUSPENSION MICROLARYNGOSCOPY WITH BALLOON DILATION AND JET VENT, KENALOG INJECTION, flexible nasolaryngoscopy 3/28. Pt s/p trach placement .      Prior Level of Function:  Lives With: Spouse  Type of Home: House  Home Layout: One level,Able to Live on Main level with bedroom/bathroom  Home Access: Stairs to enter without rails  Entrance Stairs - Number of Steps: 2-4inch steps, uses door frame  Home Equipment: Wheelchair-manual (NO AD at home, wheelchair for further distances in community)   Bathroom Shower/Tub: Walk-in shower,Shower chair without back (bench)  Bathroom Toilet: Standard  Bathroom Equipment: Grab bars in shower  Bathroom Accessibility: Accessible    Receives Help From: Family  ADL Assistance: Independent  Homemaking Assistance: Needs assistance  Ambulation Assistance: Independent  Transfer Assistance: Independent  Active : Yes (Patient reports she has driven a few times ~ 5 miles)  Additional Comments: Pt amb without AD    Restrictions/Precautions:  Restrictions/Precautions: Fall Risk  Position Activity Restriction  Other position/activity restrictions: Trach mask      SUBJECTIVE: RN approved session. Pt resting in bed and agrees to therapy. Pt to have procedure this afternoon and could possibly go home tonight or tomorrow. PAIN: denies      Vitals: Vitals not assessed per clinical judgement, see nursing flowsheet  O2 at 100% during ambulation with trach mask at 28% FiO2    OBJECTIVE:  Bed Mobility:  Supine to Sit: Modified Independent    Transfers:  Sit to Stand: Supervision  Stand to Sit:Stand By Assistance    Ambulation:  Supervision, Stand By Assistance  Distance: 180 ft  Surface: Level Tile  Device:Rolling Walker for most of distance and no device for about 10 ft of it. Gait Deviations:  Decreased Step Length Bilaterally and Decreased Gait Speed    Balance:  Static Sitting Balance:  Independent  Dynamic Sitting Balance: Modified Independent  Static Standing Balance: Supervision, Stand By Assistance  Dynamic Standing Balance: Supervision, Stand By Assistance, at sink to brush teeth and wash hands    Exercise:  Patient was guided in 1 set(s) 12 reps of exercise to both lower extremities.   Ankle pumps, Glut sets, Quad sets, Heelslides, Hip abduction/adduction, Seated marches, Long arc quads and Seated abduction/adduction. Exercises were completed for increased independence with functional mobility. Functional Outcome Measures: Completed  AM-PAC Inpatient Mobility without Stair Climbing Raw Score : 15  AM-PAC Inpatient without Stair Climbing T-Scale Score : 43.03    ASSESSMENT:  Assessment: Patient progressing toward established goals. and moving well. Pt is familiar with HEP and is motivated to keep mobility going. Activity Tolerance:  Patient tolerance of  treatment: good. Equipment Recommendations:Equipment Needed: No  Discharge Recommendations: Continue to assess pending progress and Home with Assist as Needed  Plan: Times per week: 2-3x GM  Current Treatment Recommendations: Strengthening,Functional Mobility Training,Transfer Training,Balance Training,Endurance Training,Gait Training,Stair training,Neuromuscular Re-education,Patient/Caregiver Education & Training    Patient Education  Patient Education: Plan of Care, Home Exercise Program    Goals:  Patient goals : to go home  Short term goals  Time Frame for Short term goals: by discharge  Short term goal 1: Pt to transfer sit <--> stand I for increased functional mobility. Short term goal 2: Pt to ambulate >200 feet without AD with Supervision for household ambulation. Short term goal 3: Pt to ascend/descend 2 steps with HR SBA for home entry. Long term goals  Time Frame for Long term goals : NA due to short length of stay. Following session, patient left in safe position with all fall risk precautions in place. Magnolia Escoto.  Porfirio Calvo, [address] [place] [address]

## 2022-04-07 NOTE — PROGRESS NOTES
Patients tracheostomy cuff was deflated and a speaking valve was placed on the tracheostomy tube. Patient was not on a ventilator at the time of application. The speaking valve remained on the patient for ongoing . Patient was able to speak while using the valve. Speech therapist was not present during speaking valve application. Patient had no problems when using the speaking valve.  Patients vitals were  Pulse: 66   Resp: 16  BP: 118/78  SpO2: 100 %  Temp: 98.5 °F (36.9 °C)       Ventilator settings, if applicable:  N/A

## 2022-04-07 NOTE — ANESTHESIA PRE PROCEDURE
Department of Anesthesiology  Preprocedure Note       Name:  Varinder Howell   Age:  73 y.o.  :  1950                                          MRN:  [id number]         Date:  2022      Surgeon: Liana Franklin):  Benito Gardiner MD    Procedure: Procedure(s):  Suspension Laryngoscopy Lateral of Right True Vocal Cord Tracheostomy Tube Change    Medications prior to admission:   Prior to Admission medications    Medication Sig Start Date End Date Taking? Authorizing Provider   budesonide (PULMICORT) 0.5 MG/2ML nebulizer suspension Take 2 mLs by nebulization 2 times daily 22  Yes WYATT Aguero CNP   sodium chloride nebulizer 0.9 % solution Take 3 mLs by nebulization in the morning, at noon, and at bedtime 22 Yes WYATT Aguero CNP   predniSONE (DELTASONE) 10 MG tablet Take 3 tablets by mouth daily for 14 days 22 Yes WYATT Aguero CNP   glipiZIDE (GLUCOTROL) 5 MG tablet Take 1 tablet by mouth 2 times daily (with meals) TAKE AS PREVIOUSLY USED AT HOME 3/23/22   Juana Chi MD   hydrOXYzine (VISTARIL) 25 MG capsule Take 1 capsule by mouth 4 times daily as needed for Anxiety  Patient not taking: Reported on 3/27/2022 1/27/22   Sandrine Boateng MD   albuterol sulfate  (90 Base) MCG/ACT inhaler Inhale 2 puffs into the lungs every 6 hours as needed for Wheezing  Patient not taking: Reported on 3/27/2022 1/27/22   Sandrine Boateng MD   miconazole (MICOTIN) 2 % powder Apply topically 2 times daily.   Patient not taking: Reported on 3/27/2022 1/27/22   Sandrine Boateng MD   diclofenac sodium (VOLTAREN) 1 % GEL Apply 2 g topically 4 times daily as needed for Pain 22   Sandrine Boateng MD   furosemide (LASIX) 40 MG tablet Take 1 tablet by mouth daily  Patient not taking: Reported on 3/27/2022 1/28/22   Sandrine Boateng MD   docusate sodium (COLACE) 100 MG capsule Take 1 capsule by mouth 2 times daily  Patient not taking: Reported on 3/27/2022 1/27/22 Patrica Huertas MD   polycarbophil [facility]) 625 MG tablet Take 1 tablet by mouth daily  Patient not taking: Reported on 3/27/2022 1/28/22   Patrica Huertas MD   senna (SENOKOT) 8.6 MG tablet Take 1 tablet by mouth 2 times daily as needed (Constipation)  Patient not taking: Reported on 3/27/2022 1/27/22 5/27/22  Patrica Huertas MD   melatonin 3 MG TABS tablet Take 2 tablets by mouth nightly 1/27/22   Patrica Huertas MD   famotidine (PEPCID) 20 MG tablet Take 1 tablet by mouth 2 times daily  Patient not taking: Reported on 3/27/2022 1/27/22   Patrica Huertas MD   OXYGEN Inhale 2 L/min into the lungs continuous prn (during activities such as walking) 1/27/22   Patrica Huertas MD   rosuvastatin (CRESTOR) 10 MG tablet Take 10 mg by mouth daily    Historical Provider, MD   nitroGLYCERIN (NITROSTAT) 0.4 MG SL tablet Place 0.4 mg under the tongue every 5 minutes as needed for Chest pain up to max of 3 total doses. If no relief after 1 dose, call 911.   Patient not taking: Reported on 3/27/2022    Historical Provider, MD   aspirin 81 MG chewable tablet Take 81 mg by mouth daily    Historical Provider, MD       Current medications:    Current Facility-Administered Medications   Medication Dose Route Frequency Provider Last Rate Last Admin    insulin glargine (LANTUS) injection vial 10 Units  10 Units SubCUTAneous Nightly Raheem Stock MD        insulin lispro (HUMALOG) injection vial 10 Units  10 Units SubCUTAneous TID AC Raheem Stock MD   10 Units at 04/06/22 1706    glipiZIDE (GLUCOTROL) tablet 5 mg  5 mg Oral BID AC Nolan Carter MD   5 mg at 04/06/22 1635    predniSONE (DELTASONE) tablet 30 mg  30 mg Oral Daily WYATT Aguero - CNP   30 mg at 04/07/22 0908    ipratropium-albuterol (DUONEB) nebulizer solution 1 ampule  1 ampule Inhalation Q4H PRN Tomy Lawler DO        insulin lispro (HUMALOG) injection vial 0-18 Units  0-18 Units SubCUTAneous TID  Tomy Lawler DO   9 Units at 04/06/22 4441 Benton V, DO   25 mg at 04/04/22 0218    melatonin tablet 6 mg  6 mg Oral Nightly Martina Benton V, DO   6 mg at 04/06/22 2026    nitroGLYCERIN (NITROSTAT) SL tablet 0.4 mg  0.4 mg SubLINGual Q5 Min PRN Martina Benton V, DO        polycarbophil (FIBERCON) tablet 625 mg  625 mg Oral Daily Martina Benton V, DO   625 mg at 04/06/22 8921    rosuvastatin (CRESTOR) tablet 10 mg  10 mg Oral Nightly Martina Benton V, DO   10 mg at 04/06/22 2027    senna (SENOKOT) tablet 8.6 mg  1 tablet Oral BID PRN Corie Sharps V, DO   8.6 mg at 04/01/22 2027    guaiFENesin (MUCINEX) extended release tablet 600 mg  600 mg Oral BID Martina Benton V, DO   600 mg at 04/07/22 7970    metoprolol succinate (TOPROL XL) extended release tablet 25 mg  25 mg Oral Daily Martina Benton V, DO   25 mg at 04/07/22 0904    glucagon (rDNA) injection 1 mg  1 mg IntraMUSCular PRN Corie Sharps V, DO        dextrose 5 % solution  100 mL/hr IntraVENous PRN Martina Benton V, DO        glucose chewable tablet 4 each  4 tablet Oral PRN Martina Benton V, DO        dextrose bolus (hypoglycemia) 10% 125 mL  125 mL IntraVENous PRN Martina Benton V, DO        Or    dextrose bolus (hypoglycemia) 10% 250 mL  250 mL IntraVENous PRN Corie Sharps V, DO           Allergies: Allergies   Allergen Reactions    Metformin And Related Other (See Comments)     diarrhea    Codeine Nausea And Vomiting    Meperidine Hcl Nausea And Vomiting    Sulfa Antibiotics Nausea And Vomiting    Sumatriptan Nausea And Vomiting       Problem List:    Patient Active Problem List   Diagnosis Code    COVID-19 U07.1    Hypoxia R09.02    Acute respiratory failure with hypoxia (HCC) J96.01    Debility R53.81    Physical deconditioning R53.81    History of COVID-19 Z86.16    Dysarthria R47.1    Primary hypertension I10    Type 2 diabetes mellitus (HCC) E11.9    Obesity (BMI 30-39. 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    Shortness of breath R06.02       Past Medical History:        Diagnosis Date    Coronary artery disease     COVID     Primary hypertension     Type 2 diabetes mellitus ([address] 2018       Past Surgical History:        Procedure Laterality Date    CARDIAC SURGERY      CATARACT REMOVAL Bilateral      SECTION      3 times    CORONARY ANGIOPLASTY WITH STENT PLACEMENT      stenting twice    EYE SURGERY      HIATAL HERNIA REPAIR      late     HYSTERECTOMY, TOTAL ABDOMINAL      in     LARYNGOSCOPY N/A 3/22/2022    SUSPENSON LARYNGOSCOPY WITH JET VENT, DILATION performed by Elissa Powell MD at [address] 3/28/2022    SUSPENSION MICROLARYNGOSCOPY WITH BALLOON DILATION AND JET VENT, KENALOG INJECTION performed by Elissa Powell MD at [address] [facility]      in [place] [address] N/A 2022    TRACHEOTOMY TUBE REPLACEMENT performed by Elissa Powell MD at [facility] History:    Social History     Tobacco Use    Smoking status: Never Smoker    Smokeless tobacco: Never Used   Substance Use Topics    Alcohol use: Not Currently     Comment: drinks 1 glass of wine cooler or wine about 3 times per year                                Counseling given: Not Answered      Vital Signs (Current):   Vitals:    22 0730 22 0747 22 1045 22 1505   BP: 129/75  118/78 115/72   Pulse: 70  66 62   Resp: 18  16 17   Temp: 97.6 °F (36.4 °C)  98.5 °F (36.9 °C) 98.4 °F (36.9 °C)   TempSrc: Oral  Oral Oral   SpO2: 100% 100% 100% 100%   Weight:       Height:                                                  BP Readings from Last 3 Encounters:   22 115/72   22 (!) 73/50   22 (!) 74/48       NPO Status:                                                                                 BMI:   Wt Readings from Last 3 Encounters: 04/05/22 190 lb 8 oz (86.4 kg)   03/21/22 190 lb 11.2 oz (86.5 kg)   03/09/22 196 lb (88.9 kg)     Body mass index is 34.84 kg/m². CBC:   Lab Results   Component Value Date    WBC 17.4 04/06/2022    RBC 4.37 04/06/2022    HGB 12.9 04/06/2022    HCT 40.1 04/06/2022    MCV 91.8 04/06/2022     04/06/2022       CMP:   Lab Results   Component Value Date     04/07/2022    K 4.6 04/07/2022     04/07/2022    CO2 25 04/07/2022    BUN 23 04/07/2022    CREATININE 0.6 04/07/2022    LABGLOM >90 04/07/2022    GLUCOSE 163 04/07/2022    PROT 7.6 03/18/2022    CALCIUM 9.1 04/07/2022    BILITOT 0.2 03/18/2022    ALKPHOS 101 03/18/2022    AST 16 03/18/2022    ALT 9 03/18/2022       POC Tests:   Recent Labs     04/07/22  1622   POCGLU 171*       Coags: No results found for: PROTIME, INR, APTT    HCG (If Applicable): No results found for: PREGTESTUR, PREGSERUM, HCG, HCGQUANT     ABGs: No results found for: PHART, PO2ART, GDO3IUO, XTT6NIC, BEART, T0OOLTLY     Type & Screen (If Applicable):  No results found for: LABABO, LABRH    Drug/Infectious Status (If Applicable):  No results found for: HIV, HEPCAB    COVID-19 Screening (If Applicable):   Lab Results   Component Value Date    COVID19 detected 12/17/2021           Anesthesia Evaluation  Patient summary reviewed  Airway: Mallampati: III  TM distance: >3 FB   Neck ROM: full  Comment: trach  Mouth opening: > = 3 FB Dental:          Pulmonary:   (+) pneumonia:  shortness of breath:                             Cardiovascular:    (+) hypertension:, CAD:, CABG/stent:,       ECG reviewed      Echocardiogram reviewed                  Neuro/Psych:   (+) neuromuscular disease:,             GI/Hepatic/Renal:             Endo/Other:    (+) Diabetes, . Abdominal:             Vascular: Other Findings:             Anesthesia Plan      general     ASA 3       Induction: inhalational and intravenous.     MIPS: Postoperative opioids intended and Prophylactic antiemetics administered. Anesthetic plan and risks discussed with patient and spouse. Plan discussed with EUGENE. Nik Horne.  [facility],    4/7/2022

## 2022-04-07 NOTE — PROGRESS NOTES
Medicine Progress Note    Patient:  Uriel Arellano    Unit/Bed:4-01/001-A  YOB: 1950  MRN: [id number]   Acct: [de-identified]   PCP: Gwenlyn Goodell  Date of Admission: 3/27/2022    Hospital Course     Briefly, pt Uriel Arellano is a 73 y.o. female with a PMH of essential HTN, uncontrolled NIDDMT2 c/b diabetic neuropathy A1c 9.9% 12/21/21 on glipizide only, H/o COVID-19 infection requiring intubation c/b tracheal stenosis s/p suspension microlayngoscopy w/ relief of glottic stenosis / airway balloon dilation / laryngeal steroid injection 03/22/22, CAD s/p PCI w/ BMS to LAD in 2008 on ASA 81 mg daily, who presented with worsening shortness of breath 03/27/22 and was found to have significant recurrence glottic stenosis. Pt was also found to be hyperglycemic 2/2 initiation of steroids requiring insulin. Tracheostomy 04/01/22 by ENT. 04/06/22 - flexible nasolaryngoscopy and bronchoscopy with Dr. De Combs - recovered movements of glottis tolerating plugging trials. 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 Requiring [address]. Inspiratory and expiratory stidor on auscultation c/w fixed obstruction from glottic stenosis. S/p suspension microlayngoscopy w/ relief of glottic stenosis / airway balloon dilation / laryngeal steroid injection 03/22/22 by Dr. Vignesh Joseph MD. 6 Shiley Cuffed Tracheostomy placed 04/01/22 by Dr. Vignesh Joseph MD. 04/06/22 - flexible nasolaryngoscopy and bronchoscopy with Dr. De Combs - recovered movements of glottis tolerating plugging trials.    Plan:   -ENT consulted, appreciate recs     +Continue prednisone 30 mg daily     +Continue humidification per tracheostomy mask     +Continue tracheostomy teaching and encourage self care     +Continue tracheostomy management     +Plan to change to uncuffed tube today     +Continuous pulse oximetry     +Begin plugging trials     +Continue plan for suspension laryngoscopy  -Aspiration precautions  -Acapella as tolerated  -IS as tolerated  -Wean O2 requirement as tolerated    #Uncontrolled NIDDMT2 c/b bilateral neuropathy A1c 9.9 in 12/21/21: Active  Takes glipizide only at home  Diabetic Nephropathy: no screening in chart  Diabetic Retinopathy: no screening in chart  Diabetic Neuropathy: Bilateral LE sensation diminished; burning sensation present, not currently on pharmacotherapy  Pt continues to refuse insulin therapy at discharge. Plan:  -Continue glipizide to improve insulin resistance in the setting of steroid use  -Continue High Dose SSI Algorithm on 10 units lispro TID  -Continue insulin glargine  -Hypoglycemia protocol  -POCT glucose qAC/HS  -BMP every 72 hours to monitor for electrolyte abnormalities    #Steroid-induced Leukocytosis: Stable  Edith concurrently w/ initiation of steroids. No s/s or evidence of infection at this time. Plan: Continue to monitor with CBC    #Non-obstructive CAD s/p PCI w/ BMS of LAD in 2008: Stable  Takes ASA 81 mg daily  Plan: Hold ASA 81 mg for tracheostomy; resume per ENT    #Chronic Systolic + Diastolic Heart Failure with EF 60% on 03/18/22, NYHA Class III 2/2 ICM: Stable  GDMT at home: none, started on metoprolol succinate 25 mg daily. Diuretics at home: furosemide.  Follows in HF Clinic: No.   Plan:   -Strict I&Os  -Daily weights  -Refer to HF outpatient clinic on discharge if patient is amenable  -Continue metoprolol succinate 25 mg daily    #Acute on Chronic Anxiety: Stable  Started on LD Buspar  Plan: Continue Buspar    Dispo: 4k    Fluids: IVNS @ 75 cc/hr  Electrolyte: Replete as needed  Nutrition: NPO  GI: Pepcid    Lines/Tubes: Right forearm PIV 03/31/2022    DVT ppx: SCDs  PT/OT/SLP: PT/OT/SLP for further evaluation and management    Code status: Full Code No additional code details    Subjective     -Pt recovering nicely   -Trach mask in good position  -OR w/ Dr. Margaret Kauffman today.   -No acute events overnight  -No fevers, chills, nausea, or vomiting    Initial HPI     Andreia Olson is 73years old female who presented to the hospital for worsening shortness of breath. Past medical history significant with recent hospital admission for glottic stenosis (3/18-23/22), CAD SP PIC, hypertension, diabetes, anxiety. Patient was recently admitted and managed for glottic stenosis with she underwent suspension microlaryngoscopy with relief of glottic stenosis, airway balloon dilation, and laryngeal steroid injection on 3/22/22. Patient was discharged home on 3/24. Since 3/25, patient has increase in mucus production which patient feeling \"something stuck in her throat\". She has been using Intensive spirometry at home which she recall that from yesterday she couldn't be able to get any air into the spirometry. Patient reported SOB has been worsening which she decided to to to ED. Denied any fever, chill, chest pain, yellow cough without sputum production, recent sick contact.     In the ED, patient O2 sat 93% on room air. Patient received Decadron 8 mg and racemic epi which her SOB has been improved and O2 sat 100% on room air.      Upon visit patient can talk in full conversation without respiratory distress. \" - Dr. Jessee Bland, DO     Objective     Vitals:     /78   Pulse 72   Temp 98 °F (36.7 °C) (Oral)   Resp 20   Ht 5' 2\" (1.575 m)   Wt 190 lb 8 oz (86.4 kg)   LMP  (LMP Unknown)   SpO2 100%   BMI 34.84 kg/m²     FNC Settings:                     FiO2 : 28 %     Intake and Output:       Intake/Output Summary (Last 24 hours) at 4/7/2022 0705  Last data filed at 4/6/2022 1420  Gross per 24 hour   Intake 536 ml   Output 500 ml   Net 36 ml       Outpatient Medications:     Scheduled Meds:   insulin glargine  30 Units SubCUTAneous Nightly    insulin lispro  10 Units SubCUTAneous TID AC    glipiZIDE  5 mg Oral BID AC    predniSONE  30 mg Oral Daily    insulin lispro  0-18 Units SubCUTAneous TID WC    [Held by provider] insulin lispro  0-9 Units SubCUTAneous Nightly    insulin lispro  0-6 Units SubCUTAneous Nightly    busPIRone  10 mg Oral TID    budesonide  0.5 mg Nebulization BID    sodium chloride flush  5-40 mL IntraVENous 2 times per day    [Held by provider] aspirin  81 mg Oral Daily    famotidine  20 mg Oral BID    [Held by provider] furosemide  40 mg Oral Daily    melatonin  6 mg Oral Nightly    polycarbophil  625 mg Oral Daily    rosuvastatin  10 mg Oral Nightly    guaiFENesin  600 mg Oral BID    metoprolol succinate  25 mg Oral Daily     Continuous Infusions:   sodium chloride      dextrose       PRN Meds:ipratropium-albuterol, sodium chloride flush, sodium chloride, ondansetron **OR** ondansetron, polyethylene glycol, acetaminophen **OR** acetaminophen, racepinephrine HCl, sodium chloride nebulizer, diclofenac sodium, hydrOXYzine, nitroGLYCERIN, senna, glucagon (rDNA), dextrose, glucose, dextrose bolus (hypoglycemia) **OR** dextrose bolus (hypoglycemia)    Physical Exam:     Physical Exam  Constitutional:       General: She is not in acute distress. Appearance: She is obese. She is not ill-appearing. HENT:      Head: Normocephalic and atraumatic. Nose: No congestion or rhinorrhea. Mouth/Throat:      Mouth: Mucous membranes are moist.      Pharynx: Oropharynx is clear. No oropharyngeal exudate or posterior oropharyngeal erythema. Eyes:      General: No scleral icterus. Extraocular Movements: Extraocular movements intact. Pupils: Pupils are equal, round, and reactive to light. Neck:      Trachea: Tracheostomy (good position, no erythema or purulence around incision site.) present. Cardiovascular:      Rate and Rhythm: Normal rate and regular rhythm. Pulses: Normal pulses. Heart sounds: Normal heart sounds. No murmur heard. No friction rub. No gallop. Pulmonary:      Effort: Pulmonary effort is normal.      Breath sounds: No stridor. No wheezing. Abdominal:      General: Abdomen is flat. Palpations: Abdomen is soft. Tenderness: There is no abdominal tenderness. There is no guarding or rebound. Musculoskeletal:         General: Normal range of motion. Right lower leg: No edema. Left lower leg: No edema. Skin:     General: Skin is warm and dry. Capillary Refill: Capillary refill takes less than 2 seconds. Neurological:      General: No focal deficit present. Mental Status: She is alert and oriented to person, place, and time. Psychiatric:         Mood and Affect: Mood normal.         Behavior: Behavior normal.         Labs:     Recent Labs     04/06/22  0500   WBC 17.4*   HGB 12.9   HCT 40.1      MPV 10.5     Recent Labs     04/05/22  0734 04/06/22  0500 04/07/22  0437    134* 139   K 5.5* 4.4 4.6    102 102   CO2 26 22* 25   BUN 28* 21 23*   CREATININE 0.7 0.6 0.6   GLUCOSE 181* 165* 163*   PHOS  --  3.1  --    MG  --  2.0  --      No results for input(s): BILITOT, BILIDIR, PROT, ALBUMIN, AST in the last 72 hours. Invalid input(s): ALK, LABALT  No results for input(s): TROPONINI, CKMB in the last 72 hours. Invalid input(s): CKTOT  No results for input(s): CHOL, TRIG, HDL, LDL in the last 72 hours. No results for input(s): SEDRATE, HSCRP in the last 72 hours. No results for input(s): APTT, INR in the last 72 hours. Invalid input(s): PT  Invalid input(s): HGBA1C  No results for input(s): PH, CIJ9XON, PO2, BASE in the last 72 hours. Invalid input(s): OVD1OIQ, SO2, INSPIREDO2  No results for input(s): ABO, RH in the last 72 hours. @LABALLVALUEIP(AMYLASE:5)@  No results for input(s): COLORU, PROTUR, UROBILINOGEN, OSMOLALITY, NAUR, KUR, CLUR, AMPHETAMINE, BENZOURQL, CANNABINOIDS, COCAINE, PCP in the last 72 hours.     Invalid input(s): Derril Pine, MJ, GLUCUA, KETONESU, BILIRUBIN, HGBUA, NITRITEU, LEUKOCTEST, WBCU, RBCU, MUCOUS, SPECGRAVUR, BARBSCRNUR, DOAUR  No results for input(s): TSH, T3FREE, FREET4 in the last 72 hours. Diagnostics:     Imaging:  No results found.     EKG:   No new    Micro:   Patient Infection Status     Infection Onset Added Last Indicated Last Indicated By Review Planned Expiration Resolved Resolved By    None active    Resolved    COVID-19 12/17/21 12/19/21 12/17/21 COVID-19   01/06/22 Huma Smith, RN    +12/17, admit 12/18 - 80%          Path:   N/A    Signed:  Kofi Cheney MD  Internal Medicine, PGY-1  4/7/2022  7:05 AM    Staff: Dr. Jasmeet Ponce MD

## 2022-04-08 VITALS
HEIGHT: 62 IN | OXYGEN SATURATION: 97 % | DIASTOLIC BLOOD PRESSURE: 69 MMHG | WEIGHT: 190.5 LBS | HEART RATE: 79 BPM | TEMPERATURE: 98.2 F | BODY MASS INDEX: 35.06 KG/M2 | SYSTOLIC BLOOD PRESSURE: 124 MMHG | RESPIRATION RATE: 20 BRPM

## 2022-04-08 LAB
ANION GAP SERPL CALCULATED.3IONS-SCNC: 12 MEQ/L (ref 8–16)
BUN BLDV-MCNC: 21 MG/DL (ref 7–22)
CALCIUM SERPL-MCNC: 8.6 MG/DL (ref 8.5–10.5)
CHLORIDE BLD-SCNC: 100 MEQ/L (ref 98–111)
CO2: 23 MEQ/L (ref 23–33)
CREAT SERPL-MCNC: 0.7 MG/DL (ref 0.4–1.2)
GFR SERPL CREATININE-BSD FRML MDRD: 83 ML/MIN/1.73M2
GLUCOSE BLD-MCNC: 269 MG/DL (ref 70–108)
GLUCOSE BLD-MCNC: 358 MG/DL (ref 70–108)
GLUCOSE BLD-MCNC: 412 MG/DL (ref 70–108)
POTASSIUM REFLEX MAGNESIUM: 5.5 MEQ/L (ref 3.5–5.2)
SODIUM BLD-SCNC: 135 MEQ/L (ref 135–145)

## 2022-04-08 PROCEDURE — 6360000002 HC RX W HCPCS: Performed by: NURSE PRACTITIONER

## 2022-04-08 PROCEDURE — 2580000003 HC RX 258: Performed by: STUDENT IN AN ORGANIZED HEALTH CARE EDUCATION/TRAINING PROGRAM

## 2022-04-08 PROCEDURE — 80048 BASIC METABOLIC PNL TOTAL CA: CPT

## 2022-04-08 PROCEDURE — 6370000000 HC RX 637 (ALT 250 FOR IP): Performed by: INTERNAL MEDICINE

## 2022-04-08 PROCEDURE — 94640 AIRWAY INHALATION TREATMENT: CPT

## 2022-04-08 PROCEDURE — 6370000000 HC RX 637 (ALT 250 FOR IP): Performed by: STUDENT IN AN ORGANIZED HEALTH CARE EDUCATION/TRAINING PROGRAM

## 2022-04-08 PROCEDURE — 6370000000 HC RX 637 (ALT 250 FOR IP): Performed by: NURSE PRACTITIONER

## 2022-04-08 PROCEDURE — 6370000000 HC RX 637 (ALT 250 FOR IP)

## 2022-04-08 PROCEDURE — 36415 COLL VENOUS BLD VENIPUNCTURE: CPT

## 2022-04-08 PROCEDURE — 94760 N-INVAS EAR/PLS OXIMETRY 1: CPT

## 2022-04-08 PROCEDURE — 2700000000 HC OXYGEN THERAPY PER DAY

## 2022-04-08 PROCEDURE — 99239 HOSP IP/OBS DSCHRG MGMT >30: CPT | Performed by: HOSPITALIST

## 2022-04-08 PROCEDURE — 82948 REAGENT STRIP/BLOOD GLUCOSE: CPT

## 2022-04-08 RX ORDER — GLIPIZIDE 5 MG/1
10 TABLET ORAL
Qty: 60 TABLET | Refills: 3 | Status: SHIPPED | OUTPATIENT
Start: 2022-04-08 | End: 2022-05-23

## 2022-04-08 RX ORDER — GLIPIZIDE 5 MG/1
10 TABLET ORAL
Qty: 60 TABLET | Refills: 3 | Status: SHIPPED | OUTPATIENT
Start: 2022-04-08 | End: 2022-04-08

## 2022-04-08 RX ORDER — BUDESONIDE 0.5 MG/2ML
0.5 INHALANT ORAL 2 TIMES DAILY
Qty: 60 EACH | Refills: 3 | Status: SHIPPED | OUTPATIENT
Start: 2022-04-08 | End: 2022-04-25 | Stop reason: SDUPTHER

## 2022-04-08 RX ORDER — METOPROLOL SUCCINATE 25 MG/1
25 TABLET, EXTENDED RELEASE ORAL DAILY
Qty: 30 TABLET | Refills: 3 | Status: SHIPPED | OUTPATIENT
Start: 2022-04-08 | End: 2022-04-08

## 2022-04-08 RX ORDER — SODIUM CHLORIDE FOR INHALATION 0.9 %
3 VIAL, NEBULIZER (ML) INHALATION 3 TIMES DAILY
Qty: 270 ML | Refills: 0 | Status: SHIPPED | OUTPATIENT
Start: 2022-04-08 | End: 2022-04-25 | Stop reason: SDUPTHER

## 2022-04-08 RX ORDER — PREDNISONE 10 MG/1
30 TABLET ORAL DAILY
Qty: 42 TABLET | Refills: 0 | Status: SHIPPED | OUTPATIENT
Start: 2022-04-08 | End: 2022-04-22

## 2022-04-08 RX ORDER — METOPROLOL SUCCINATE 25 MG/1
25 TABLET, EXTENDED RELEASE ORAL DAILY
Qty: 30 TABLET | Refills: 3 | Status: SHIPPED | OUTPATIENT
Start: 2022-04-08

## 2022-04-08 RX ADMIN — INSULIN LISPRO 10 UNITS: 100 INJECTION, SOLUTION INTRAVENOUS; SUBCUTANEOUS at 08:38

## 2022-04-08 RX ADMIN — BUSPIRONE HYDROCHLORIDE 10 MG: 10 TABLET ORAL at 08:43

## 2022-04-08 RX ADMIN — PREDNISONE 30 MG: 20 TABLET ORAL at 08:43

## 2022-04-08 RX ADMIN — SODIUM CHLORIDE, PRESERVATIVE FREE 10 ML: 5 INJECTION INTRAVENOUS at 08:44

## 2022-04-08 RX ADMIN — FAMOTIDINE 20 MG: 20 TABLET ORAL at 08:43

## 2022-04-08 RX ADMIN — CALCIUM POLYCARBOPHIL 625 MG: 625 TABLET, FILM COATED ORAL at 08:43

## 2022-04-08 RX ADMIN — INSULIN LISPRO 9 UNITS: 100 INJECTION, SOLUTION INTRAVENOUS; SUBCUTANEOUS at 12:39

## 2022-04-08 RX ADMIN — BUDESONIDE 500 MCG: 0.5 INHALANT RESPIRATORY (INHALATION) at 07:32

## 2022-04-08 RX ADMIN — GLIPIZIDE 5 MG: 5 TABLET ORAL at 08:44

## 2022-04-08 RX ADMIN — METOPROLOL SUCCINATE 25 MG: 25 TABLET, FILM COATED, EXTENDED RELEASE ORAL at 08:44

## 2022-04-08 RX ADMIN — INSULIN LISPRO 10 UNITS: 100 INJECTION, SOLUTION INTRAVENOUS; SUBCUTANEOUS at 12:38

## 2022-04-08 RX ADMIN — INSULIN LISPRO 15 UNITS: 100 INJECTION, SOLUTION INTRAVENOUS; SUBCUTANEOUS at 08:37

## 2022-04-08 RX ADMIN — GUAIFENESIN 600 MG: 600 TABLET, EXTENDED RELEASE ORAL at 08:44

## 2022-04-08 NOTE — PROGRESS NOTES
Pt returned to floor at 2000. Pt tolerated transport well, AOx4. No c/o at this time, VSS.  at bedside. Pt states she feels much better compared to before having procedure done. Will continue to monitor.

## 2022-04-08 NOTE — PROGRESS NOTES
Patient teaching regarding trach care and trach care itself completed- patient and spouse verbalized understanding and could provide teach-back. Discharge instructions completed and all patient questions regarding trach care, follow up appointments, lab work, and home management of diabetes completed. IV removed without complications. Patient's  loading personal belongings into personal car. Medications called to OP [facility] pharmacy on-site and patient instructed to go down there upon transport to take home medications. This RN followed patient down to OP pharmacy to ensure no complications with obtaining home medication, followed patient to personal car to ensure proper 02 with trach mask set-up available. Patient encouraged to vocalize any questions at this time and patient had none. Patient and spouse left with thankful disposition. Discharge teaching and instructions for diagnosis/procedure of SOB with trach placement completed with patient using teachback method. AVS reviewed. Printed prescriptions given to patient. Patient voiced understanding regarding prescriptions, follow up appointments, and care of self at home. Discharged patient and  to home by personal car.

## 2022-04-08 NOTE — PROGRESS NOTES
CLINICAL PHARMACY: DISCHARGE MED RECONCILIATION/REVIEW    [facility] ([facility]) Select Patient?: No  Total # of Interventions Recommended: 0   -   Total # Interventions Accepted: 0  Intervention Severity:   - Level 1 Intervention Present?: No   - Level 2 #: 0   - Level 3 #: 0   Time Spent (min): 15    Additional Documentation:    Kay Hatch PharmD, BCPS   4/8/2022  1:12 PM

## 2022-04-08 NOTE — PROGRESS NOTES
[address] THERAPY MISSED TREATMENT NOTE  STR[facility] ICU STEPDOWN TELEMETRY 4K  4K-01/001-A      Date: 2022  Patient Name: Joe Oswald        CSN: [id number]   : 1950  (73 y.o.)  Gender: female   Referring Practitioner: Sherren Duel, DO  Diagnosis: Shortness of breath         REASON FOR MISSED TREATMENT: Patient declined services at this time as she is to be leaving when trach supplies arrive. patient is independent with all ADLs/IADLs and feels she is doing well declining therapy. D/c orders at this time.

## 2022-04-08 NOTE — CARE COORDINATION
4/8/22, 8:07 AM EDT      Plans home  w spouse Lion Duron, has [employer] home oxygen 2L, new #6 Shiley uncuffed (old style; patient will have to clean w water twice daily as neither Distribution nor [employer] carry old style trach; family and patient aware) trach/suction/mist/nebulizer/HME/cool mist supplies (confirmed w Annita Horton [facility] Liaison); prefers [facility] (nsg, ST); therapy following; current w [facility]; collaborated w [facility] Liaison, Kentrell Ruiz RN, patient/family, Edwina Pearce, [facility] [place] [address] Liaison     Patient goals/plan/ treatment preferences discussed by  and . Patient goals/plan/ treatment preferences reviewed with patient/ family. Patient/ family verbalize understanding of discharge plan and are in agreement with goal/plan/treatment preferences. Understanding was demonstrated using the teach back method. AVS provided by RN at time of discharge, which includes all necessary medical information pertaining to the patients current course of illness, treatment, post-discharge goals of care, and treatment preferences.       Services After Discharge  Services At/After Discharge: Nursing Services,SLP [facility])   IMM Letter  IMM Letter given to Patient/Family/Significant other/Guardian/POA/by[de-identified] YVETTE Contreras CM  IMM Letter date given[de-identified] 04/07/22  IMM Letter time given[de-identified] 2519

## 2022-04-08 NOTE — PROGRESS NOTES
[address]  PHYSICAL THERAPY MISSED TREATMENT NOTE  STRZ ICU STEPDOWN TELEMETRY 4K    Date: 2022  Patient Name: Jadyn Hardin        MRN: [id number]   : 1950  (73 y.o.)  Gender: female   Referring Practitioner: Karey Rosa DO  Diagnosis: SOB         REASON FOR MISSED TREATMENT:  Pt sitting up in room and is dressed for discharge. Pt reports just waiting for final meds so she can leave. Pt was grateful for therapies during stay and reports that she will continue HEP at home. Magi Lopez.  Ella Braga, [facility] [place] [address]

## 2022-04-08 NOTE — CARE COORDINATION
4/8/22, 10:05 AM EDT    Patient goals/plan/ treatment preferences discussed by  and . Patient goals/plan/ treatment preferences reviewed with patient/ family. Patient/ family verbalize understanding of discharge plan and are in agreement with goal/plan/treatment preferences. Understanding was demonstrated using the teach back method. AVS provided by RN at time of discharge, which includes all necessary medical information pertaining to the patients current course of illness, treatment, post-discharge goals of care, and treatment preferences. Services After Discharge  Services At/After Discharge: Nursing Services,SLP [facility])   IMM Letter  IMM Letter given to Patient/Family/Significant other/Guardian/POA/by[de-identified] YVETTE Contreras CM  IMM Letter date given[de-identified] 04/07/22  IMM Letter time given[de-identified] 0952       Patient to discharge home with [facility]. [facility] notified of discharge. RN made aware.

## 2022-04-08 NOTE — PLAN OF CARE
Problem: Falls - Risk of:  Goal: Will remain free from falls  Description: Will remain free from falls  Outcome: Ongoing  Note: Pt free from fall this shift, will continue to monitor       Problem: Daily Care:  Goal: Daily care needs are met  Description: Daily care needs are met  Outcome: Ongoing     Problem: Skin Integrity:  Goal: Skin integrity will stabilize  Description: Skin integrity will stabilize  Outcome: Ongoing  Note: No new skin issues this shift, will continue to monitor       Problem: Discharge Planning:  Goal: Patients continuum of care needs are met  Description: Patients continuum of care needs are met  Outcome: Ongoing     Problem: OXYGENATION/RESPIRATORY FUNCTION  Goal: Patient will maintain patent airway  Outcome: Ongoing  Note: Pt is able to cough up secretions most of the time, requires some suctioning, Pt and  are both hands on with learning to suction     Problem: Skin Integrity:  Goal: Absence of new skin breakdown  Description: Absence of new skin breakdown  Outcome: Ongoing  Note: No new skin issues this shift, will continue to monitor     Care plan reviewed with patient. Patient verbalized understanding of the plan of care and contribute to goal setting.

## 2022-04-08 NOTE — DISCHARGE INSTR - DIET
Good nutrition is important when healing from an illness, injury, or surgery. Follow any nutrition recommendations given to you during your hospital stay. If you were given an oral nutrition supplement while in the hospital, continue to take this supplement at home. You can take it with meals, in-between meals, and/or before bedtime. These supplements can be purchased at most local grocery stores, pharmacies, and chain [employer]-stores. If you have any questions about your diet or nutrition, call the hospital and ask for the dietitian.     Regular diet, carb controlled

## 2022-04-08 NOTE — ANESTHESIA POSTPROCEDURE EVALUATION
Department of Anesthesiology  Postprocedure Note    Patient: Anna Mcfarland  MRN: [id number]  YOB: 1950  Date of evaluation: 4/7/2022  Time:  8:05 PM     Procedure Summary     Date: 04/07/22 Room / Location: [address] [place] [address] AARTI Briggs    Anesthesia Start: 6429 Anesthesia Stop: 1570    Procedure: Suspension Laryngoscopy  Lateral of Right True Vocal Cord, With Steroid Injection of Larynx And Tracheostomy Tube Change, debridement (N/A ) Diagnosis: (Glattic stenosis)    Surgeons: Greta Jackson MD Responsible Provider: Natalia Rivas DO    Anesthesia Type: general ASA Status: 3          Anesthesia Type: general    Patricia Phase I: Patricia Score: 8    Patricia Phase II:      Last vitals: Reviewed and per EMR flowsheets. Anesthesia Post Evaluation    Comments: [facility] [address]  POST-ANESTHESIA NOTE       Name:  Anna Mcfarland                                         Age:  73 y.o.   MRN:  [id number]      Last Vitals:  /63   Pulse 57   Temp 97.1 °F (36.2 °C) (Temporal)   Resp 12   Ht 5' 2\" (1.575 m)   Wt 190 lb 8 oz (86.4 kg)   LMP  (LMP Unknown)   SpO2 100%   BMI 34.84 kg/m²   Patient Vitals in the past 4 hrs:  04/07/22 1950, BP:130/63, Pulse:57, Resp:12, SpO2:100 %  04/07/22 1945, BP:136/65, Pulse:57, Resp:16, SpO2:100 %  04/07/22 1940, BP:(!) 142/67, Pulse:63, Resp:20, SpO2:100 %  04/07/22 1935, BP:(!) 158/73, Pulse:56, Resp:18, SpO2:99 %  04/07/22 1930, BP:137/73, Pulse:57, Resp:18, SpO2:100 %  04/07/22 1925, BP:(!) 146/65, Pulse:56, Resp:14, SpO2:100 %  04/07/22 1920, BP:(!) 122/95, Pulse:54, Resp:14, SpO2:100 %  04/07/22 1915, Pulse:56, Resp:15, SpO2:100 %  04/07/22 1910, BP:139/72, Pulse:56, Resp:14, SpO2:100 %  04/07/22 1905, BP:(!) 143/73, Pulse:56, Resp:18, SpO2:99 %  04/07/22 1900, BP:(!) 145/67, Pulse:56, Resp:18, SpO2:99 %  04/07/22 1855, BP:137/75, Pulse:55, Resp:17, SpO2:99 %  04/07/22 1850, BP:(!) 140/70, Pulse:60, Resp:13, SpO2:100 %  04/07/22 1845, BP:(!) 147/67, Pulse:58, Resp:11, SpO2:98 %  04/07/22 1840, BP:(!) 162/72, Pulse:77, Resp:23, SpO2:100 %  04/07/22 1835, BP:(!) 146/98, Pulse:68, Resp:21, SpO2:100 %  04/07/22 1831, BP:(!) 146/99, Temp:97.1 °F (36.2 °C), Temp src:Temporal, Pulse:67, Resp:17, SpO2:98 %    Level of Consciousness:  Awake    Respiratory:  Stable    Oxygen Saturation:  Stable    Cardiovascular:  Stable    Hydration:  Adequate    PONV:  Stable    Post-op Pain:  Adequate analgesia    Post-op Assessment:  No apparent anesthetic complications    Additional Follow-Up / Treatment / Comment:  None    Steve Patterson.  DO Marta  April 7, 2022   8:05 PM

## 2022-04-08 NOTE — PLAN OF CARE
Problem: Falls - Risk of:  Goal: Will remain free from falls  Description: Will remain free from falls  4/8/2022 0857 by Villalobos Standard, RN  Outcome: Met This Shift   Pt remains free from falls. Bed alarm on, call light and personal items within reach. Pt up with help from staff    Problem: Daily Care:  Goal: Daily care needs are met  Description: Daily care needs are met  4/8/2022 0857 by Villalobos Standard, RN  Outcome: Met This Shift   Daily care needs met at this time     Problem: Skin Integrity:  Goal: Skin integrity will stabilize  Description: Skin integrity will stabilize  4/8/2022 0857 by Villalobos Standard, RN  Outcome: Ongoing   Pt shows no signs of new skin breakdown     Problem: Discharge Planning:  Goal: Patients continuum of care needs are met  Description: Patients continuum of care needs are met  4/8/2022 0857 by Villalobos Standard, RN  Outcome: Ongoing   Pt plans to discharge home when stable. Problem: OXYGENATION/RESPIRATORY FUNCTION  Goal: Patient will maintain patent airway  4/8/2022 0857 by Villalobos Standard, RN  Outcome: Ongoing   Pt remains on trach mask. Airway patent   O2 sat greater than 90%    Problem: Skin Integrity:  Goal: Absence of new skin breakdown  Description: Absence of new skin breakdown  4/8/2022 0857 by Villalobos Standard, RN  Outcome: Ongoing   Pt remains free from new skin breakdown. Q2 repositioning encouraged     Problem: Metabolic:  Goal: Ability to maintain appropriate glucose levels will improve  Description: Ability to maintain appropriate glucose levels will improve  Outcome: Ongoing   Pt on SSI as ordered for coverage. Care plan reviewed with patient. Patient verbalize understanding of the plan of care and contribute to goal setting.

## 2022-04-08 NOTE — PLAN OF CARE
Problem: OXYGENATION/RESPIRATORY FUNCTION  Goal: Patient will maintain patent airway  4/8/2022 0739 by Geo Huntley RCP  Outcome: Ongoing  4/7/2022 2243 by Ijeoma Gomez RN  Outcome: Ongoing  Note: Pt is able to cough up secretions most of the time, requires some suctioning, Pt and  are both hands on with learning to suction

## 2022-04-12 ENCOUNTER — TELEPHONE (OUTPATIENT)
Dept: ENT CLINIC | Age: 72
End: 2022-04-12

## 2022-04-12 ENCOUNTER — OFFICE VISIT (OUTPATIENT)
Dept: ENT CLINIC | Age: 72
End: 2022-04-12
Payer: MEDICARE

## 2022-04-12 VITALS
SYSTOLIC BLOOD PRESSURE: 120 MMHG | RESPIRATION RATE: 14 BRPM | TEMPERATURE: 98.1 F | DIASTOLIC BLOOD PRESSURE: 78 MMHG | OXYGEN SATURATION: 98 % | HEART RATE: 94 BPM

## 2022-04-12 DIAGNOSIS — J38.00 VOCAL CORD PARALYSIS: ICD-10-CM

## 2022-04-12 DIAGNOSIS — J38.3 VOCAL CORD DYSFUNCTION: ICD-10-CM

## 2022-04-12 DIAGNOSIS — J38.6 POSTERIOR GLOTTIC STENOSIS: Primary | ICD-10-CM

## 2022-04-12 DIAGNOSIS — Z93.0 TRACHEOSTOMY DEPENDENCE (HCC): ICD-10-CM

## 2022-04-12 PROCEDURE — 3017F COLORECTAL CA SCREEN DOC REV: CPT | Performed by: OTOLARYNGOLOGY

## 2022-04-12 PROCEDURE — 4040F PNEUMOC VAC/ADMIN/RCVD: CPT | Performed by: OTOLARYNGOLOGY

## 2022-04-12 PROCEDURE — 1036F TOBACCO NON-USER: CPT | Performed by: OTOLARYNGOLOGY

## 2022-04-12 PROCEDURE — 1123F ACP DISCUSS/DSCN MKR DOCD: CPT | Performed by: OTOLARYNGOLOGY

## 2022-04-12 PROCEDURE — G8400 PT W/DXA NO RESULTS DOC: HCPCS | Performed by: OTOLARYNGOLOGY

## 2022-04-12 PROCEDURE — G8427 DOCREV CUR MEDS BY ELIG CLIN: HCPCS | Performed by: OTOLARYNGOLOGY

## 2022-04-12 PROCEDURE — 1090F PRES/ABSN URINE INCON ASSESS: CPT | Performed by: OTOLARYNGOLOGY

## 2022-04-12 PROCEDURE — 1111F DSCHRG MED/CURRENT MED MERGE: CPT | Performed by: OTOLARYNGOLOGY

## 2022-04-12 PROCEDURE — 99213 OFFICE O/P EST LOW 20 MIN: CPT | Performed by: OTOLARYNGOLOGY

## 2022-04-12 PROCEDURE — G8417 CALC BMI ABV UP PARAM F/U: HCPCS | Performed by: OTOLARYNGOLOGY

## 2022-04-12 RX ORDER — INSULIN GLARGINE 100 [IU]/ML
INJECTION, SOLUTION SUBCUTANEOUS
Status: ON HOLD | COMMUNITY
Start: 2022-04-11 | End: 2022-08-13 | Stop reason: SDUPTHER

## 2022-04-12 NOTE — PROGRESS NOTES
Patient is a 73year old from [facility] ED with Dr Demetrio Pepper transferring. Patient with tracheostomy (from [facility]) had an appointment today with Dr Rohith French for a trach downsizing from a 6 cuffless to a 4 cuffless trach. Once patient arrived home she c/o difficulty breathing to her  who then removed her trach and drove her to [facility] ED, during which patient became obtunded during transit and upon arrival to their ED SpO2 40%. Patient is typically on 2-3 L via trach and is now requiring 10L trach mask with SpO2 92%. Na 142, K+ 2.5, Chloride 117, HCO3 13, anion gap 15, glucose 411, mag 1.2. CXR reportedly shows pneumomediastinum. Patient was given magnesium, ca gluconate, and KCL. EKG sinus tachycardia. CT head no official read however Dr Demetrio Pepper states that it doesn't appear to show anything acute. Patient is alert and answering questions by nodding currently. /68, HR 98, RR 23.  Patient accepted in transfer to a stepdown under hospitalist.

## 2022-04-12 NOTE — TELEPHONE ENCOUNTER
Patients  called and he stated that he changed trach to a size 4 and it was bothering her, she couldn't breath so he took it out and suctioned her out and he said the tube wouldn't advance so he forced it and made her bleed. He said she is having pain in her chest and back and is gasping for air. I suggested he take her to the ED and he agreed. He stated that they live 40 miles from [facility].

## 2022-04-12 NOTE — PROGRESS NOTES
[facility] Ear[facility] Nose [facility] Throat    HPI   It was a pleasure to see Demi Martinez, a 73 y.o. female, who returns today for a trach downsizing she has been doing well at home with her trach. She is able to tolerate the Passy-[place] valve for a little bit, but does feel restricted in her ability to . She is excited about the trach downsizing today. The review of systems, past medical, past surgical, social, and family history were updated in the medical chart and reviewed today. No significant changes were noted. Objective     Vitals:    22 1204   BP: 120/78   Pulse: 94   Resp: 14   Temp: 98.1 °F (36.7 °C)   SpO2: 98%       PHYSICAL EXAM FINDINGS:  GENERAL: Awake, NAD, Non-toxic appearing. Patient is alert and oriented toperson, place and time. No respiratory distress. No nasal voice, no hoarseness. Not obviously hearing impaired. NEUROLOGICAL: GCS 15, Cranial nerves II-VI, VIII-XII grossly intact,  Facial nerve (VII) w/ House-Brackman Grade 1 of 6 bilaterally, No evidence of nystagmus or gross asymmetry    PSYCHIATRIC: Mood and Affect appropriate. HEAD: NC/AT  SKIN: No overtly ulcerated, cellulitic, or indurated lesions of the head or neck. EARS: Pinna well-formed  NOSE: Dorsum w/o scar or deformity  ORAL CAVITY: No mucosal masses/lesions appreciated,    OROPHARYNX: No mucosal masses or lesions appreciated. NECK: Soft, supple. No crepitus or masses appreciated,  Trachea midline. CHEST: Breathing is unlabored without retraction    Procedure:   Procedure Performed: Exchange of tracheostomy tube  Attending: Ilene Freeman MD  Indications: Ready for trach downsizing  Anesthesia: None  Blood loss: None  Complications: None  Trach: Shiley 4 cuffless fenestrated  Findings: The trach exchange was performed without difficulty. The new trach set flat against the neck and the patient was found to be breathing well. She placed the Passy-[place] valve on the trach, and was able to voice well.   Procedure: The procedure was discussed with the patient and verbal consent was obtained. The neck was extended. The existing tracheostomy was removed. The fresh trach was lubricated and inserted without difficulty. A [place] collar was secured. The patient tolerated the procedure well. There were no complications. Data: The relevant prior clinic notes were reviewed today, including the referring physicians notes. Imaging: None          Assessment      Diagnosis Orders   1. Posterior glottic stenosis     2. Tracheostomy dependence [facility])             Ling Garcia is a 73 y.o. female with: Posterior glottic stenosis, now status post multiple airway procedures. I performed her tracheostomy on April 1. She is doing well with her current trach. She was downsized without issue today to a 4 cuffless fenestrated trach. We called her home health company, and she is going to  some appropriately sized and cannulas on her way home. I will see her back in 2 weeks to see how she is doing with the trach, perform flexible nasolaryngoscopy to assess her vocal cord function. We talked about the Passy-Janeth valve, and the read. I am okay if she starts trialing both of these. Ultimately I will want her to be red capped for several days prior to decannulation. Thank you for involving me in this patient's care. Please do not hesitate to call with any questions or concerns. Sincerely,    Yamil Roy M.D. Otolaryngology-Head and Neck Surgery    **This report has been created using voice recognition software. It may contain minor errors which are inherent in voice recognition technology. **

## 2022-04-13 ENCOUNTER — APPOINTMENT (OUTPATIENT)
Dept: CT IMAGING | Age: 72
DRG: 205 | End: 2022-04-13
Attending: INTERNAL MEDICINE
Payer: MEDICARE

## 2022-04-13 ENCOUNTER — HOSPITAL ENCOUNTER (INPATIENT)
Age: 72
LOS: 1 days | Discharge: HOME HEALTH | DRG: 205 | End: 2022-04-14
Attending: INTERNAL MEDICINE | Admitting: HOSPITALIST
Payer: MEDICARE

## 2022-04-13 PROBLEM — J96.01 ACUTE HYPOXEMIC RESPIRATORY FAILURE (HCC): Status: ACTIVE | Noted: 2022-04-13

## 2022-04-13 PROBLEM — J96.00 ACUTE RESPIRATORY FAILURE (HCC): Status: ACTIVE | Noted: 2022-04-13

## 2022-04-13 LAB
ANION GAP SERPL CALCULATED.3IONS-SCNC: 11 MEQ/L (ref 8–16)
BASE EXCESS MIXED: 0.3 MMOL/L (ref -2–3)
BASOPHILS # BLD: 0.3 %
BASOPHILS ABSOLUTE: 0 THOU/MM3 (ref 0–0.1)
BUN BLDV-MCNC: 18 MG/DL (ref 7–22)
CALCIUM SERPL-MCNC: 8.6 MG/DL (ref 8.5–10.5)
CHLORIDE BLD-SCNC: 100 MEQ/L (ref 98–111)
CO2: 25 MEQ/L (ref 23–33)
COLLECTED BY:: ABNORMAL
CREAT SERPL-MCNC: 0.7 MG/DL (ref 0.4–1.2)
EOSINOPHIL # BLD: 0.7 %
EOSINOPHILS ABSOLUTE: 0.1 THOU/MM3 (ref 0–0.4)
ERYTHROCYTE [DISTWIDTH] IN BLOOD BY AUTOMATED COUNT: 13.2 % (ref 11.5–14.5)
ERYTHROCYTE [DISTWIDTH] IN BLOOD BY AUTOMATED COUNT: 43.9 FL (ref 35–45)
GFR SERPL CREATININE-BSD FRML MDRD: 83 ML/MIN/1.73M2
GLUCOSE BLD-MCNC: 269 MG/DL (ref 70–108)
GLUCOSE BLD-MCNC: 292 MG/DL (ref 70–108)
GLUCOSE BLD-MCNC: 325 MG/DL (ref 70–108)
GLUCOSE BLD-MCNC: 330 MG/DL (ref 70–108)
GLUCOSE BLD-MCNC: 414 MG/DL (ref 70–108)
HCO3, MIXED: 24 MMOL/L (ref 23–28)
HCT VFR BLD CALC: 39.5 % (ref 37–47)
HEMOGLOBIN: 12.5 GM/DL (ref 12–16)
IMMATURE GRANS (ABS): 0.29 THOU/MM3 (ref 0–0.07)
IMMATURE GRANULOCYTES: 2.3 %
LYMPHOCYTES # BLD: 11.8 %
LYMPHOCYTES ABSOLUTE: 1.5 THOU/MM3 (ref 1–4.8)
MCH RBC QN AUTO: 29.1 PG (ref 26–33)
MCHC RBC AUTO-ENTMCNC: 31.6 GM/DL (ref 32.2–35.5)
MCV RBC AUTO: 91.9 FL (ref 81–99)
MONOCYTES # BLD: 7.6 %
MONOCYTES ABSOLUTE: 0.9 THOU/MM3 (ref 0.4–1.3)
NUCLEATED RED BLOOD CELLS: 0 /100 WBC
O2 SAT, MIXED: 74 %
PCO2, MIXED VENOUS: 34 MMHG (ref 41–51)
PH, MIXED: 7.45 (ref 7.31–7.41)
PLATELET # BLD: 165 THOU/MM3 (ref 130–400)
PMV BLD AUTO: 9.5 FL (ref 9.4–12.4)
PO2 MIXED: 37 MMHG (ref 25–40)
POTASSIUM REFLEX MAGNESIUM: 5.2 MEQ/L (ref 3.5–5.2)
RBC # BLD: 4.3 MILL/MM3 (ref 4.2–5.4)
SEG NEUTROPHILS: 77.3 %
SEGMENTED NEUTROPHILS ABSOLUTE COUNT: 9.6 THOU/MM3 (ref 1.8–7.7)
SODIUM BLD-SCNC: 136 MEQ/L (ref 135–145)
WBC # BLD: 12.4 THOU/MM3 (ref 4.8–10.8)

## 2022-04-13 PROCEDURE — 36415 COLL VENOUS BLD VENIPUNCTURE: CPT

## 2022-04-13 PROCEDURE — 6360000004 HC RX CONTRAST MEDICATION: Performed by: PHYSICIAN ASSISTANT

## 2022-04-13 PROCEDURE — 94640 AIRWAY INHALATION TREATMENT: CPT

## 2022-04-13 PROCEDURE — 99221 1ST HOSP IP/OBS SF/LOW 40: CPT | Performed by: OTOLARYNGOLOGY

## 2022-04-13 PROCEDURE — 85025 COMPLETE CBC W/AUTO DIFF WBC: CPT

## 2022-04-13 PROCEDURE — 6370000000 HC RX 637 (ALT 250 FOR IP): Performed by: PHYSICIAN ASSISTANT

## 2022-04-13 PROCEDURE — 2060000000 HC ICU INTERMEDIATE R&B

## 2022-04-13 PROCEDURE — 99222 1ST HOSP IP/OBS MODERATE 55: CPT | Performed by: PHYSICIAN ASSISTANT

## 2022-04-13 PROCEDURE — 6360000002 HC RX W HCPCS: Performed by: PHYSICIAN ASSISTANT

## 2022-04-13 PROCEDURE — 82803 BLOOD GASES ANY COMBINATION: CPT

## 2022-04-13 PROCEDURE — 82948 REAGENT STRIP/BLOOD GLUCOSE: CPT

## 2022-04-13 PROCEDURE — 2700000000 HC OXYGEN THERAPY PER DAY

## 2022-04-13 PROCEDURE — 6370000000 HC RX 637 (ALT 250 FOR IP)

## 2022-04-13 PROCEDURE — 80048 BASIC METABOLIC PNL TOTAL CA: CPT

## 2022-04-13 PROCEDURE — 2580000003 HC RX 258: Performed by: PHYSICIAN ASSISTANT

## 2022-04-13 PROCEDURE — 71275 CT ANGIOGRAPHY CHEST: CPT

## 2022-04-13 PROCEDURE — 87081 CULTURE SCREEN ONLY: CPT

## 2022-04-13 PROCEDURE — 94760 N-INVAS EAR/PLS OXIMETRY 1: CPT

## 2022-04-13 RX ORDER — MAGNESIUM SULFATE IN WATER 40 MG/ML
2000 INJECTION, SOLUTION INTRAVENOUS PRN
Status: DISCONTINUED | OUTPATIENT
Start: 2022-04-13 | End: 2022-04-14 | Stop reason: HOSPADM

## 2022-04-13 RX ORDER — ONDANSETRON 4 MG/1
4 TABLET, ORALLY DISINTEGRATING ORAL EVERY 8 HOURS PRN
Status: DISCONTINUED | OUTPATIENT
Start: 2022-04-13 | End: 2022-04-14 | Stop reason: HOSPADM

## 2022-04-13 RX ORDER — GUAIFENESIN 600 MG/1
600 TABLET, EXTENDED RELEASE ORAL 2 TIMES DAILY
Status: DISCONTINUED | OUTPATIENT
Start: 2022-04-13 | End: 2022-04-14 | Stop reason: HOSPADM

## 2022-04-13 RX ORDER — ACETAMINOPHEN 650 MG/1
650 SUPPOSITORY RECTAL EVERY 6 HOURS PRN
Status: DISCONTINUED | OUTPATIENT
Start: 2022-04-13 | End: 2022-04-14 | Stop reason: HOSPADM

## 2022-04-13 RX ORDER — SODIUM CHLORIDE FOR INHALATION 0.9 %
3 VIAL, NEBULIZER (ML) INHALATION 3 TIMES DAILY
Status: DISCONTINUED | OUTPATIENT
Start: 2022-04-13 | End: 2022-04-14 | Stop reason: HOSPADM

## 2022-04-13 RX ORDER — LANOLIN ALCOHOL/MO/W.PET/CERES
6 CREAM (GRAM) TOPICAL NIGHTLY
Status: DISCONTINUED | OUTPATIENT
Start: 2022-04-13 | End: 2022-04-14 | Stop reason: HOSPADM

## 2022-04-13 RX ORDER — NICOTINE POLACRILEX 4 MG
15 LOZENGE BUCCAL PRN
Status: DISCONTINUED | OUTPATIENT
Start: 2022-04-13 | End: 2022-04-13 | Stop reason: CLARIF

## 2022-04-13 RX ORDER — POTASSIUM CHLORIDE 7.45 MG/ML
10 INJECTION INTRAVENOUS PRN
Status: DISCONTINUED | OUTPATIENT
Start: 2022-04-13 | End: 2022-04-14 | Stop reason: HOSPADM

## 2022-04-13 RX ORDER — ONDANSETRON 2 MG/ML
4 INJECTION INTRAMUSCULAR; INTRAVENOUS EVERY 6 HOURS PRN
Status: DISCONTINUED | OUTPATIENT
Start: 2022-04-13 | End: 2022-04-14 | Stop reason: HOSPADM

## 2022-04-13 RX ORDER — SENNA PLUS 8.6 MG/1
1 TABLET ORAL 2 TIMES DAILY PRN
Status: DISCONTINUED | OUTPATIENT
Start: 2022-04-13 | End: 2022-04-14 | Stop reason: HOSPADM

## 2022-04-13 RX ORDER — FAMOTIDINE 20 MG/1
20 TABLET, FILM COATED ORAL 2 TIMES DAILY
Status: DISCONTINUED | OUTPATIENT
Start: 2022-04-13 | End: 2022-04-14 | Stop reason: HOSPADM

## 2022-04-13 RX ORDER — LIDOCAINE 4 G/G
1 PATCH TOPICAL DAILY
Status: DISCONTINUED | OUTPATIENT
Start: 2022-04-13 | End: 2022-04-14 | Stop reason: HOSPADM

## 2022-04-13 RX ORDER — SODIUM CHLORIDE 9 MG/ML
INJECTION, SOLUTION INTRAVENOUS PRN
Status: DISCONTINUED | OUTPATIENT
Start: 2022-04-13 | End: 2022-04-14 | Stop reason: HOSPADM

## 2022-04-13 RX ORDER — SODIUM CHLORIDE 0.9 % (FLUSH) 0.9 %
5-40 SYRINGE (ML) INJECTION EVERY 12 HOURS SCHEDULED
Status: DISCONTINUED | OUTPATIENT
Start: 2022-04-13 | End: 2022-04-14 | Stop reason: HOSPADM

## 2022-04-13 RX ORDER — ALBUTEROL SULFATE 90 UG/1
2 AEROSOL, METERED RESPIRATORY (INHALATION) EVERY 6 HOURS PRN
Status: DISCONTINUED | OUTPATIENT
Start: 2022-04-13 | End: 2022-04-14 | Stop reason: HOSPADM

## 2022-04-13 RX ORDER — POLYETHYLENE GLYCOL 3350 17 G/17G
17 POWDER, FOR SOLUTION ORAL DAILY PRN
Status: DISCONTINUED | OUTPATIENT
Start: 2022-04-13 | End: 2022-04-14 | Stop reason: HOSPADM

## 2022-04-13 RX ORDER — BUDESONIDE 0.5 MG/2ML
0.5 INHALANT ORAL 2 TIMES DAILY
Status: DISCONTINUED | OUTPATIENT
Start: 2022-04-13 | End: 2022-04-14 | Stop reason: HOSPADM

## 2022-04-13 RX ORDER — METOPROLOL SUCCINATE 25 MG/1
25 TABLET, EXTENDED RELEASE ORAL DAILY
Status: DISCONTINUED | OUTPATIENT
Start: 2022-04-13 | End: 2022-04-14 | Stop reason: HOSPADM

## 2022-04-13 RX ORDER — DOCUSATE SODIUM 100 MG/1
100 CAPSULE, LIQUID FILLED ORAL 2 TIMES DAILY
Status: DISCONTINUED | OUTPATIENT
Start: 2022-04-13 | End: 2022-04-14 | Stop reason: HOSPADM

## 2022-04-13 RX ORDER — HYDROXYZINE PAMOATE 25 MG/1
25 CAPSULE ORAL 4 TIMES DAILY PRN
Status: DISCONTINUED | OUTPATIENT
Start: 2022-04-13 | End: 2022-04-14 | Stop reason: HOSPADM

## 2022-04-13 RX ORDER — ACETAMINOPHEN 325 MG/1
650 TABLET ORAL EVERY 6 HOURS PRN
Status: DISCONTINUED | OUTPATIENT
Start: 2022-04-13 | End: 2022-04-14 | Stop reason: HOSPADM

## 2022-04-13 RX ORDER — FUROSEMIDE 40 MG/1
40 TABLET ORAL DAILY
Status: DISCONTINUED | OUTPATIENT
Start: 2022-04-13 | End: 2022-04-14 | Stop reason: HOSPADM

## 2022-04-13 RX ORDER — ROSUVASTATIN CALCIUM 10 MG/1
10 TABLET, COATED ORAL DAILY
Status: DISCONTINUED | OUTPATIENT
Start: 2022-04-13 | End: 2022-04-14 | Stop reason: HOSPADM

## 2022-04-13 RX ORDER — POTASSIUM CHLORIDE 20 MEQ/1
40 TABLET, EXTENDED RELEASE ORAL PRN
Status: DISCONTINUED | OUTPATIENT
Start: 2022-04-13 | End: 2022-04-14 | Stop reason: HOSPADM

## 2022-04-13 RX ORDER — INSULIN GLARGINE 100 [IU]/ML
30 INJECTION, SOLUTION SUBCUTANEOUS NIGHTLY
Status: DISCONTINUED | OUTPATIENT
Start: 2022-04-13 | End: 2022-04-14 | Stop reason: HOSPADM

## 2022-04-13 RX ORDER — DEXTROSE MONOHYDRATE 25 G/50ML
12.5 INJECTION, SOLUTION INTRAVENOUS PRN
Status: DISCONTINUED | OUTPATIENT
Start: 2022-04-13 | End: 2022-04-13 | Stop reason: CLARIF

## 2022-04-13 RX ORDER — DEXTROSE MONOHYDRATE 50 MG/ML
100 INJECTION, SOLUTION INTRAVENOUS PRN
Status: DISCONTINUED | OUTPATIENT
Start: 2022-04-13 | End: 2022-04-14 | Stop reason: HOSPADM

## 2022-04-13 RX ORDER — ASPIRIN 81 MG/1
81 TABLET, CHEWABLE ORAL DAILY
Status: DISCONTINUED | OUTPATIENT
Start: 2022-04-13 | End: 2022-04-14 | Stop reason: HOSPADM

## 2022-04-13 RX ORDER — SODIUM CHLORIDE 0.9 % (FLUSH) 0.9 %
5-40 SYRINGE (ML) INJECTION PRN
Status: DISCONTINUED | OUTPATIENT
Start: 2022-04-13 | End: 2022-04-14 | Stop reason: HOSPADM

## 2022-04-13 RX ADMIN — FAMOTIDINE 20 MG: 20 TABLET ORAL at 20:38

## 2022-04-13 RX ADMIN — METOPROLOL SUCCINATE 25 MG: 25 TABLET, EXTENDED RELEASE ORAL at 07:51

## 2022-04-13 RX ADMIN — ASPIRIN 81 MG 81 MG: 81 TABLET ORAL at 07:51

## 2022-04-13 RX ADMIN — SODIUM CHLORIDE, PRESERVATIVE FREE 10 ML: 5 INJECTION INTRAVENOUS at 07:52

## 2022-04-13 RX ADMIN — PREDNISONE 30 MG: 20 TABLET ORAL at 12:15

## 2022-04-13 RX ADMIN — BUDESONIDE 500 MCG: 0.5 INHALANT RESPIRATORY (INHALATION) at 17:47

## 2022-04-13 RX ADMIN — INSULIN LISPRO 4 UNITS: 100 INJECTION, SOLUTION INTRAVENOUS; SUBCUTANEOUS at 12:14

## 2022-04-13 RX ADMIN — FUROSEMIDE 40 MG: 40 TABLET ORAL at 07:51

## 2022-04-13 RX ADMIN — INSULIN LISPRO 10 UNITS: 100 INJECTION, SOLUTION INTRAVENOUS; SUBCUTANEOUS at 12:15

## 2022-04-13 RX ADMIN — ISODIUM CHLORIDE 3 ML: 0.03 SOLUTION RESPIRATORY (INHALATION) at 13:51

## 2022-04-13 RX ADMIN — IOPAMIDOL 80 ML: 755 INJECTION, SOLUTION INTRAVENOUS at 13:38

## 2022-04-13 RX ADMIN — DOCUSATE SODIUM 100 MG: 100 CAPSULE, LIQUID FILLED ORAL at 07:51

## 2022-04-13 RX ADMIN — INSULIN LISPRO 10 UNITS: 100 INJECTION, SOLUTION INTRAVENOUS; SUBCUTANEOUS at 16:59

## 2022-04-13 RX ADMIN — SODIUM CHLORIDE, PRESERVATIVE FREE 10 ML: 5 INJECTION INTRAVENOUS at 20:38

## 2022-04-13 RX ADMIN — INSULIN LISPRO 2 UNITS: 100 INJECTION, SOLUTION INTRAVENOUS; SUBCUTANEOUS at 20:39

## 2022-04-13 RX ADMIN — Medication 6 MG: at 20:38

## 2022-04-13 RX ADMIN — INSULIN LISPRO 3 UNITS: 100 INJECTION, SOLUTION INTRAVENOUS; SUBCUTANEOUS at 17:00

## 2022-04-13 RX ADMIN — INSULIN GLARGINE 30 UNITS: 100 INJECTION, SOLUTION SUBCUTANEOUS at 20:38

## 2022-04-13 RX ADMIN — FAMOTIDINE 20 MG: 20 TABLET ORAL at 07:51

## 2022-04-13 RX ADMIN — ISODIUM CHLORIDE 3 ML: 0.03 SOLUTION RESPIRATORY (INHALATION) at 17:47

## 2022-04-13 RX ADMIN — ROSUVASTATIN 10 MG: 10 TABLET, FILM COATED ORAL at 20:38

## 2022-04-13 RX ADMIN — ENOXAPARIN SODIUM 40 MG: 100 INJECTION SUBCUTANEOUS at 07:52

## 2022-04-13 ASSESSMENT — PAIN SCALES - GENERAL
PAINLEVEL_OUTOF10: 0

## 2022-04-13 ASSESSMENT — ENCOUNTER SYMPTOMS
ALLERGIC/IMMUNOLOGIC NEGATIVE: 1
GASTROINTESTINAL NEGATIVE: 1
SHORTNESS OF BREATH: 1
EYES NEGATIVE: 1

## 2022-04-13 NOTE — PROGRESS NOTES
CLINICAL PHARMACY NOTE: MEDS TO BEDS    Total # of Prescriptions Filled: 5   The following medications were delivered to the patient:  Budesonide 0.5mg/2ml  Prednisone 10 mg  Metoprolol ER 25 mg  Glipizide 5 mg  Sodium Chloride 0.9%    Additional Documentation:

## 2022-04-13 NOTE — TELEPHONE ENCOUNTER
Please fax \"DME order for trach supplies as OP\" (in other orders tab) to [facility]; One[place] Alfonso [phone]

## 2022-04-13 NOTE — PROGRESS NOTES
Medicine Plan of Care    Patient:  Jadyn Hardin    Unit/Bed:4K-03/003-A  YOB: 1950  MRN: [id number]   Acct: [de-identified]   PCP: Kaykay Nolasco  Date of Admission: 4/13/2022    Hospital Course     Briefly, pt Jadyn Hardin is a 73 y.o. female with extensive otolaryngological and medical PMH as below who presents w/ AHRF 2/2 accidental removal of tracheostomy tube. Past History     · Essential HTN  · Uncontrolled NIDDMT2 c/b diabetic neuropathy A1c 9.9% 12/21/21 on glipizide only. Refuses insulin injection therapy. · H/o COVID-19 infection requiring intubation c/b tracheal stenosis s/p suspension microlayngoscopy w/ relief of glottic stenosis / airway balloon dilation / laryngeal steroid injection 03/22/22  · S/p tracheostomy 04/01/22 by ENT, s/p flexible nasolaryngoscopy and bronchoscopy w/ Dr. Lucila Gilford w/ recovered movements of the glottis tolerating plugging trial  · S/p repeat suspension laryngoscopy w/ depomedrol 80 mg/cc injection into cricoarytenoid joint tissue 04/07/22  · Office visit w/ Dr. Ofelia Carter MD 04/12/22 - Downsized to 4 cuffless fenestrated trach without issue. · CAD s/p PCI w/ BMS to LAD in 2008 on ASA 81 mg daily    Assessment / Plan     #Acute on Chronic Hypoxic Respiratory Failure 2/2 Tracheal Stenosis: Active  Extensive otolaryngology history as above. Pt presented after tracheostomy was completely removed and pt sustained significant desaturation. Plan:   -ENT consulted, appreciate recs     +Continue prednisone 30 mg daily     +Continue humidification per tracheostomy mask     +Continue tracheostomy teaching and encourage self care     +Continue tracheostomy management     +Continuous pulse oximetry  -Aspiration precautions  -Wean O2 requirement as tolerated  -CTA Chest d/t concern for pneumomediastinum    #Uncontrolled NIDDMT2 c/b bilateral neuropathy A1c 9.9 in 12/21/21: Active  Takes glipizide only at home. Likely elevated A1c d/t chronic steroid use. Diabetic Nephropathy: no screening in chart  Diabetic Retinopathy: no screening in chart  Diabetic Neuropathy: Bilateral LE sensation diminished; burning sensation present, not currently on pharmacotherapy  Plan:  -Continue glipizide to improve insulin resistance in the setting of steroid use  -Continue High Dose SSI Algorithm on 10 units lispro TID  -Continue insulin glargine  -Hypoglycemia protocol  -POCT glucose qAC/HS  -BMP every 72 hours to monitor for electrolyte abnormalities     #Steroid-induced Leukocytosis: Stable  Edith concurrently w/ initiation of steroids. No s/s or evidence of infection at this time. Plan: Continue to monitor with CBC as appropriate     #Non-obstructive CAD s/p PCI w/ BMS of LAD in 2008: Stable  Takes ASA 81 mg daily  Plan: Hold ASA 81 mg for tracheostomy; resume per ENT     #Chronic Systolic + Diastolic Heart Failure with EF 60% on 03/18/22, NYHA Class III 2/2 ICM: Stable  GDMT at home: none, started on metoprolol succinate 25 mg daily. Diuretics at home: furosemide. Follows in HF Clinic: No.   Plan:   -Strict I&Os  -Daily weights  -Refer to HF outpatient clinic on discharge if patient is amenable  -Continue metoprolol succinate 25 mg daily     #Acute on Chronic Anxiety: Stable  Started on LD Buspar  Plan: Continue Buspar    Dispo: 4K    Fluids: Encourage PO fluid intake  Electrolyte: Replete as needed  Nutrition: 4 carb  GI: none    Lines/Tubes: L AC PIV 04/12/22, R AC PIV 04/12/22, Shiley tracheostomy #6 04/12/22    DVT ppx:  enoxaparin  PT/OT/SLP: PT/OT/SLP    Code status: Full Code No additional code details    Subjective     No acute events overnight. Initial HPI     \"Patient transferred from [facility] for further evaluation of acute respiratory failure. The patient had been evaluated earlier in the day by ENT and had her tracheostomy downsized. She tolerated the procedure well in the office but once she arrived home she couldn't breath.   Her  removed her trach and drove her to the ED. She became unresponsive on the way to the hospital and upon arrival her oxygen saturations were only 40%. She was resuscitated but is now requiring 10L trach mask. She has pneumomediastinum on CXR. Patient is describing pleuritic pain under her right breast with deep breaths. \"    Signed:  Tejinder Spence MD  Internal Medicine, PGY-1  4/13/2022  10:39 AM    Staff: Dr. Eva Mcdaniels MD

## 2022-04-13 NOTE — PLAN OF CARE
Problem: Discharge Planning:  Goal: Discharged to appropriate level of care  Description: Discharged to appropriate level of care  Outcome: Ongoing   SW consult received and completed. See SW note.

## 2022-04-13 NOTE — CARE COORDINATION
04/13/22 1034   Readmission Assessment   Number of Days since last admission? 1-7 days   Previous Disposition Home with Home Health   Who is being Interviewed Caregiver;Patient   What was the patient's/caregiver's perception as to why they think they needed to return back to the hospital?   (respiratory distress)   Did you visit your Primary Care Physician after you left the hospital, before you returned this time? Yes   Did you see a specialist, such as Cardiac, Pulmonary, Orthopedic Physician, etc. after you left the hospital? Yes   Who advised the patient to return to the hospital? Self-referral;Caregiver   Does the patient report anything that got in the way of taking their medications? No   In our efforts to provide the best possible care to you and others like you, can you think of anything that we could have done to help you after you left the hospital the first time, so that you might not have needed to return so soon?  Other (Comment)  (more trach education w replacing trach tube w actual return demonstration (not just on dummy head), question suction catheter correct size as did not pass thru stoma at home)

## 2022-04-13 NOTE — CARE COORDINATION
4/13/22, 8:52 AM EDT  DISCHARGE PLANNING EVALUATION:    Kim Cook       Admitted: 4/13/2022/ 14[address] Zak Flores day: 0   Location: -03/003-A Reason for admit: Acute respiratory failure ([facility] [address] [J96.00]  Acute hypoxemic respiratory failure (HCC) [J96.01]   PMH:  has a past medical history of Coronary artery disease, COVID, Primary hypertension, and Type 2 diabetes mellitus ([facility] [address] Barriers to Discharge: Pneumomediastinum at [facility].     4/12 ENT (in office) downsized trach from 6 to 4 Shiley fenestrated cuffless (4/1 Tracheostomy for Glottic Stenosis); oxygen desaturations in 40/s at home w respiratory distress; spouse removed trach and  drove patient to [facility]. [facility] placed Shiley 6 trach. 35% FIO2 Trach continued. ENT plans CT Chest today    PCP: Hayder Echeverria  Readmission Risk Score: 25.1 ( )%    Patient Goals/Plan/Treatment Preferences: plans home w spouse Magda Crum, has [employer] home oxygen 2-3, trach supplies; has [facility] (nursing, ST), has PMV, current w CHF Clinic; patient/family requesting more trach education w return demonstration on replacing trach tube (not just replacing trach on dummy head, family questions if suction catheters correct size at home for #4 trach; recommends 12 [place] suction catheters if #4 trach planned (need new order); collaborated w CLOVER Blakely; collaborated w ENT, [employer]    Transportation/Food Security/Housekeeping Addressed:  No issues identified.

## 2022-04-13 NOTE — PLAN OF CARE
Problem: Falls - Risk of:  Goal: Will remain free from falls  Description: Will remain free from falls  Outcome: Ongoing  Note: Patient belongings within reach, call light within reach, bed and chair in lowest position, needs anticipated. Problem: Falls - Risk of:  Goal: Absence of physical injury  Description: Absence of physical injury  Outcome: Ongoing  Note: Patient kept free of immediate harm with pillow support utilized as appropriate. Problem: Skin Integrity:  Goal: Will show no infection signs and symptoms  Description: Will show no infection signs and symptoms  Outcome: Ongoing  Note: HR and temperature monitored.      Problem: Discharge Planning:  Goal: Discharged to appropriate level of care  Description: Discharged to appropriate level of care  4/13/2022 1407 by Cholo Blackburn RN  Outcome: Ongoing  Note: Discharge needs assessed

## 2022-04-13 NOTE — CARE COORDINATION
DISCHARGE/PLANNING EVALUATION  4/13/22, 11:26 AM EDT    Reason for Referral: Discharge planning  Mental Status: Alert and Oriented  Decision Making: Self  Family/Social/Home Environment: Patient lives with spouse and has supportive family in the area that are able to assist.  Current Services including food security, transportation and housekeeping: Patient was primarily independent at home but required some assistance from spouse. Patient is current with [facility]. Current Equipment: O2, trach supplies, walker  Payment Source: Medicare  Concerns or Barriers to Discharge: None  Post acute provider list with quality measures, geographic area and applicable managed care information provided. Questions regarding selection process answered: Offered    Teach Back Method used with patient's daughter regarding care plan and needs. Patient's daughter verbalize understanding of the plan of care and contribute to goal setting. Patient goals, treatment preferences and discharge plan: Patient plans to return home with [facility]. Spouse to transport at discharge. Patient's daughter reported that she feels spouse needs to be trained and educated more on trach care. SW called Dian Carrasco at [facility] and confirmed current services.      Electronically signed by KIARRA Maradiaga on 4/13/2022 at 11:26 AM

## 2022-04-13 NOTE — CONSULTS
Department of Otolaryngology  Consult Note    Reason for Consult: Kena Mabry problems  Requesting Physician:  Dr. Daisy Wren: Kena Mabry problems    History Obtained From:  patient and     HISTORY OF PRESENT ILLNESS:                The patient is a 73 y.o. female who was admitted to [address] [place] [address] early this morning as a transfer from outside [address] [place] [address] for acute hypoxic respiratory failure secondary to trach issues. I saw her in clinic yesterday, and downsized her Shiley 6 trach to a Shiley 4 uncuffed fenestrated trach. The exchange was very smooth and she was breathing well afterwards. She was able to use her speaking valve better with the size 4. After she got home, her trach became dislodged. They were unable to pass the suction through the trach. They remove the trach hoping that would improve her breathing. It did not. When she arrived at the emergency room her oxygen saturation was in the 40s. She was quickly resuscitated after a size 6 trach was inserted without difficulty. She is doing fine now, although the whole experience was somewhat traumatic.     Past Medical History:        Diagnosis Date    Coronary artery disease     COVID     Primary hypertension     Type 2 diabetes mellitus ([facility] [address]        Past Surgical History:        Procedure Laterality Date    CARDIAC SURGERY      CATARACT REMOVAL Bilateral      SECTION      3 times    CORONARY ANGIOPLASTY WITH STENT PLACEMENT      stenting twice    EYE SURGERY      HIATAL HERNIA REPAIR      late     HYSTERECTOMY, TOTAL ABDOMINAL      in     LARYNGOSCOPY N/A 3/22/2022    SUSPENSON LARYNGOSCOPY WITH JET VENT, DILATION performed by Ilene Freeman MD at [address] [place] [address] E 3/28/2022    SUSPENSION MICROLARYNGOSCOPY WITH BALLOON DILATION AND JET VENT, KENALOG INJECTION performed by Ilene Freeman MD at [address] [place] [address] E 2022    Suspension Laryngoscopy Lateral of Right True Vocal Cord, With Steroid Injection of Larynx And Tracheostomy Tube Change, debridement performed by Germán Ogden MD at [address] [place] [address]      in [place] [address] N/A 4/1/2022    TRACHEOT[facility]AC[facility]       Current Medications:   Current Facility-Administered Medications: albuterol sulfate  (90 Base) MCG/ACT inhaler 2 puff, 2 puff, Inhalation, Q6H PRN  aspirin chewable tablet 81 mg, 81 mg, Oral, Daily  budesonide (PULMICORT) nebulizer suspension 500 mcg, 0.5 mg, Nebulization, BID  diclofenac sodium (VOLTAREN) 1 % gel 2 g, 2 g, Topical, 4x Daily PRN  docusate sodium (COLACE) capsule 100 mg, 100 mg, Oral, BID  famotidine (PEPCID) tablet 20 mg, 20 mg, Oral, BID  furosemide (LASIX) tablet 40 mg, 40 mg, Oral, Daily  hydrOXYzine (VISTARIL) capsule 25 mg, 25 mg, Oral, 4x Daily PRN  melatonin tablet 6 mg, 6 mg, Oral, Nightly  metoprolol succinate (TOPROL XL) extended release tablet 25 mg, 25 mg, Oral, Daily  rosuvastatin (CRESTOR) tablet 10 mg, 10 mg, Oral, Daily  senna (SENOKOT) tablet 8.6 mg, 1 tablet, Oral, BID PRN  sodium chloride nebulizer 0.9 % solution 3 mL, 3 mL, Nebulization, TID  sodium chloride flush 0.9 % injection 5-40 mL, 5-40 mL, IntraVENous, 2 times per day  sodium chloride flush 0.9 % injection 5-40 mL, 5-40 mL, IntraVENous, PRN  0.9 % sodium chloride infusion, , IntraVENous, PRN  enoxaparin (LOVENOX) injection 40 mg, 40 mg, SubCUTAneous, Daily  ondansetron (ZOFRAN-ODT) disintegrating tablet 4 mg, 4 mg, Oral, Q8H PRN **OR** ondansetron (ZOFRAN) injection 4 mg, 4 mg, IntraVENous, Q6H PRN  polyethylene glycol (GLYCOLAX) packet 17 g, 17 g, Oral, Daily PRN  acetaminophen (TYLENOL) tablet 650 mg, 650 mg, Oral, Q6H PRN **OR** acetaminophen (TYLENOL) suppository 650 mg, 650 mg, Rectal, Q6H PRN  potassium chloride (KLOR-CON M) extended release tablet 40 mEq, 40 mEq, Oral, PRN **OR** potassium bicarb-citric acid (EFFER-K) effervescent tablet 40 mEq, 40 mEq, Oral, PRN **OR** potassium chloride 10 mEq/100 mL IVPB (Peripheral Line), 10 mEq, IntraVENous, PRN  magnesium sulfate 2000 mg in 50 mL IVPB premix, 2,000 mg, IntraVENous, PRN  lidocaine 4 % external patch 1 patch, 1 patch, TransDERmal, Daily  predniSONE (DELTASONE) tablet 30 mg, 30 mg, Oral, Daily  insulin glargine (LANTUS) injection vial 30 Units, 30 Units, SubCUTAneous, Nightly  insulin lispro (HUMALOG) injection vial 0-6 Units, 0-6 Units, SubCUTAneous, TID WC  insulin lispro (HUMALOG) injection vial 0-3 Units, 0-3 Units, SubCUTAneous, Nightly  insulin lispro (HUMALOG) injection vial 10 Units, 10 Units, SubCUTAneous, TID WC  glucagon (rDNA) injection 1 mg, 1 mg, IntraMUSCular, PRN  dextrose 5 % solution, 100 mL/hr, IntraVENous, PRN  glucose chewable tablet 4 each, 4 tablet, Oral, PRN  dextrose bolus (hypoglycemia) 10% 125 mL, 125 mL, IntraVENous, PRN **OR** dextrose bolus (hypoglycemia) 10% 250 mL, 250 mL, IntraVENous, PRN    Allergies:  Review of patient's allergies indicates no known allergies. Social History:    Patient currently lives with . She does not smoke. Family History:       Problem Relation Age of Onset    Parkinson's Disease Father     Lung Cancer Father        REVIEW OF SYSTEMS:    A complete multi-organ review of systems was performed using a new patient questionnaire, and reviewed by me.   ENT:  negative except as noted in HPI  CONSTITUTIONAL:  negative except as noted in HPI  EYES:  negative except as noted in HPI  RESPIRATORY:  negative except as noted in HPI  CARDIOVASCULAR:  negative except as noted in HPI  GASTROINTESTINAL:  negative except as noted in HPI  GENITOURINARY:  negative except as noted in HPI  MUSCULOSKELETAL:  negative except as noted in HPI  SKIN:  negative except as noted in HPI  ENDOCRINE/METABOLIC: negative except as noted in HPI  HEMATOLOGIC/LYMPHATIC:  negative except as noted in HPI  ALLERGY/IMMUN: negative except as noted in HPI  NEUROLOGICAL:  negative except as noted in HPI  BEHAVIOR/PSYCH:  negative except as noted in HPI    PHYSICAL EXAM:    VITALS:  /65   Pulse 88   Temp 96.8 °F (36 °C) (Oral)   Resp 20   Ht 5' 2\" (1.575 m)   Wt 192 lb 1.6 oz (87.1 kg)   LMP  (LMP Unknown)   SpO2 97%   BMI 35.14 kg/m²     ABNORMAL OTOLARYNGOLOGIC EXAM FINDINGS: Shiley 6 trach with reusable inner cannula in place. She does not tolerate finger occlusion for longer than 30 seconds or so. She does have a good voice with finger occlusion. OTHER PERTINENT EXAM FINDINGS:  GENERAL: Awake, NAD, Non-toxic appearing  NEUROLOGICAL: GCS 15, Cranial nerves II-VI, VIII-XII grossly intact,  Facial nerve (VII) w/ House-Brackman Grade 1 of 6 bilaterally, No evidence of nystagmus or gross asymmetry    PSYCHIATRIC: Mood and Affect appropriate. HEAD: NC/AT, Sinuses non-tender on palpation  SKIN: No overtly ulcerated, cellulitic, or indurated lesions of the head or neck. EARS: Pinna well-formed,  EAC non-stenotic w/o cerumen impaction AU,  TM's intact AU w/o effusion or active infection appreciated  EYES: EOMI, PERRLA, No APD, No ptosis appreciated   NOSE: Dorsum w/o scar or deformity,  No mucopurulence appreciated, Patent nasal airways bilaterally. ORAL CAVITY: No mucosal masses/lesions appreciated,  Dentition normal,  FOM soft, Tongue with FROM. Appropriate CHASE w/o trismus. OROPHARYNX: No mucosal masses or lesions appreciated. BOT soft,  Symmetric palatal elevation w/o draping. NECK: Soft, supple. No crepitus or masses appreciated,  Trachea midline. CHEST: Breathing is unlabored without retraction  CARDIOVASCULAR: Heart w/ RRR. No peripheral cyanosis. Cap refill <3 seconds  ABDOMEN: Soft, Benign  MUSCULOSKELETAL: Moves all extremities equally. No clubbing, cyanosis, or edema      Imaging: I personally reviewed her CT chest.  She has a significant pneumomediastinum and a left-sided pneumothorax.   Her tracheostomy is in good position. Prominent interstitial lung disease, enlarged heart    IMPRESSION/RECOMMENDATIONS:      Her trach is in good position. I think she should keep the Shiley 6 trach. The Shiley 4 trach was likely too short to remain fully in her trachea with movement. She will likely require intervention for her pneumothorax.    -Continue humidified trach collar. Passy-[place] valve trials as tolerated. Okay for regular diet.  -Please obtain a backup trach for the patient to go home with. Also, please obtain spare reusable inner cannulas for her current Shiley 6.   -RT to perform trach teaching.  -Okay for discharge when medically appropriate      Electronically signed by Yon Quevedo MD on 4/13/2022 at 4:58 PM

## 2022-04-13 NOTE — CARE COORDINATION
04/13/22 1034   Readmission Assessment   Number of Days since last admission? 1-7 days   Previous Disposition Home with Home Health   Who is being Interviewed Caregiver;Patient   What was the patient's/caregiver's perception as to why they think they needed to return back to the hospital?   (respiratory distress)   Did you visit your Primary Care Physician after you left the hospital, before you returned this time? Yes   Did you see a specialist, such as Cardiac, Pulmonary, Orthopedic Physician, etc. after you left the hospital? Yes   Who advised the patient to return to the hospital? Self-referral;Caregiver   Does the patient report anything that got in the way of taking their medications? No   In our efforts to provide the best possible care to you and others like you, can you think of anything that we could have done to help you after you left the hospital the first time, so that you might not have needed to return so soon?  Other (Comment)  (more trach education w replacing trach tube w actual return demonstration, question suction catheter correct size as did not pass thru)

## 2022-04-13 NOTE — PROGRESS NOTES
Pt admitted to  4K3 via direct admit from [facility]   Complaints: Shortness of breath. IV site free of s/s of infection or infiltration. No IV fluids running. 10L O2 via trach/mask. Vital signs obtained. Assessment and data collection initiated. Provider notified of arrival. Two nurse skin assessment performed by Marian Estrada and Piedad Robbins RN. Buttocks/coccyx red and blanchable. Oriented to room. Policies and procedures for  explained. All questions answered with no further questions at this time. Fall prevention and safety measures discussed with patient. Bed alarm on. Call light in reach.

## 2022-04-13 NOTE — H&P
Hospitalist - History & Physical      Patient: Eamon Bone    Unit/Bed:4K-03/003-A  YOB: 1950  MRN: [id number]   Acct: [de-identified]   PCP: Branden Garcia      Assessment and Plan:        1. Acute hypoxemic respiratory failure:   a. On 10L trach mask  b. Trach exchange yesterday  c. Consult ENT  2. Pneumomediastinum  a. On CXR at [facility]  b. CT scan today      CC:  Acute respiratory failure    HPI: Patient transferred from [facility] for further evaluation of acute respiratory failure. The patient had been evaluated earlier in the day by ENT and had her tracheostomy downsized. She tolerated the procedure well in the office but once she arrived home she couldn't breath. Her  removed her trach and drove her to the ED. She became unresponsive on the way to the hospital and upon arrival her oxygen saturations were only 40%. She was resuscitated but is now requiring 10L trach mask. She has pneumomediastinum on CXR. Patient is describing pleuritic pain under her right breast with deep breaths. Patient transferred for higher level of care. ROS: Review of Systems   Constitutional: Positive for activity change. HENT: Negative. Eyes: Negative. Respiratory: Positive for shortness of breath. Cardiovascular: Positive for chest pain. Gastrointestinal: Negative. Endocrine: Negative. Genitourinary: Negative. Musculoskeletal: Negative. Skin: Negative. Allergic/Immunologic: Negative. Neurological: Negative. Hematological: Negative. Psychiatric/Behavioral: Negative.       PMH:    Past Medical History:   Diagnosis Date    Coronary artery disease     COVID     Primary hypertension     Type 2 diabetes mellitus ([facility] [address] 2018     SHX:    Social History     Socioeconomic History    Marital status:      Spouse name: bailee     Number of children: 3    Years of education: Not on file    Highest education level: Not on file   Occupational History    Not on file   Tobacco Use    Smoking status: Never Smoker    Smokeless tobacco: Never Used   Vaping Use    Vaping Use: Never used    Passive vaping exposure: Yes   Substance and Sexual Activity    Alcohol use: Not Currently     Comment: drinks 1 glass of wine cooler or wine about 3 times per year    Drug use: Never    Sexual activity: Not Currently   Other Topics Concern    Not on file   Social History Narrative    Not on file     Social Determinants of Health     Financial Resource Strain:     Difficulty of Paying Living Expenses: Not on file   Food Insecurity:     Worried About Running Out of Food in the Last Year: Not on file    Benjamin of Food in the Last Year: Not on file   Transportation Needs:     Lack of Transportation (Medical): Not on file    Lack of Transportation (Non-Medical): Not on file   Physical Activity:     Days of Exercise per Week: Not on file    Minutes of Exercise per Session: Not on file   Stress:     Feeling of Stress : Not on file   Social Connections:     Frequency of Communication with Friends and Family: Not on file    Frequency of Social Gatherings with Friends and Family: Not on file    Attends [employer]ism Services: Not on file    Active Member of [address] [place] [address] [employer] or Organizations: Not on file    Attends Club or Organization Meetings: Not on file    Marital Status: Not on file   Intimate Partner Violence:     Fear of Current or Ex-Partner: Not on file    Emotionally Abused: Not on file    Physically Abused: Not on file    Sexually Abused: Not on file   Housing Stability:     Unable to Pay for Housing in the Last Year: Not on file    Number of [place] in the Last Year: Not on file    Unstable Housing in the Last Year: Not on file     FHX:   Family History   Problem Relation Age of Onset    Parkinson's Disease Father     Lung Cancer Father      Allergies:    Allergies   Allergen Reactions    Metformin And Related Other (See Comments)     diarrhea    Codeine Nausea And Vomiting    Meperidine Hcl Nausea And Vomiting    Sulfa Antibiotics Nausea And Vomiting    Sumatriptan Nausea And Vomiting     Medications:     OXYGEN      sodium chloride        aspirin  81 mg Oral Daily    budesonide  0.5 mg Nebulization BID    docusate sodium  100 mg Oral BID    famotidine  20 mg Oral BID    furosemide  40 mg Oral Daily    melatonin  6 mg Oral Nightly    metoprolol succinate  25 mg Oral Daily    rosuvastatin  10 mg Oral Daily    sodium chloride nebulizer  3 mL Nebulization TID    sodium chloride flush  5-40 mL IntraVENous 2 times per day    enoxaparin  40 mg SubCUTAneous Daily     albuterol sulfate HFA, 2 puff, Q6H PRN  diclofenac sodium, 2 g, 4x Daily PRN  hydrOXYzine, 25 mg, 4x Daily PRN  OXYGEN, 2 L/min, Continuous PRN  senna, 1 tablet, BID PRN  sodium chloride flush, 5-40 mL, PRN  sodium chloride, , PRN  ondansetron, 4 mg, Q8H PRN   Or  ondansetron, 4 mg, Q6H PRN  polyethylene glycol, 17 g, Daily PRN  acetaminophen, 650 mg, Q6H PRN   Or  acetaminophen, 650 mg, Q6H PRN  potassium chloride, 40 mEq, PRN   Or  potassium alternative oral replacement, 40 mEq, PRN   Or  potassium chloride, 10 mEq, PRN  magnesium sulfate, 2,000 mg, PRN        Labs:   Recent Results (from the past 24 hour(s))   POCT Glucose    Collection Time: 04/13/22  4:39 AM   Result Value Ref Range    POC Glucose 292 (H) 70 - 108 mg/dl         Vital Signs: T: 98.5F P: 96 RR: 20 B/P: 128/74: FiO2: 35% trach mask : O2 Sat:100%: I/O: No intake or output data in the 24 hours ending 04/13/22 0515      General:   Chronically ill appearing, pale  HEENT:  normocephalic and atraumatic. No scleral icterus. PEARLA, mucous membranes moist  Neck: supple. Trachea midline. No JVD. Full ROM, no meningismus. Lungs: clear to diminished on  auscultation. No retractions, no accessory muscle use. Cardiac: RRR, no murmur, 2+ pulses  Abdomen: soft. Nontender.  Bowel sounds active  Extremities:  No clubbing, cyanosis x 4, no edema    Vasculature: capillary refill < 3 seconds. Skin:  warm and dry. no visible rashes  Psych:  Alert and oriented x3. Affect appropriate  Lymph:  No supraclavicular adenopathy. Neurologic:  CN II-XII grossly intact. No focal deficit. Data: (All radiographs, tracings, PFTs, and imaging are personally viewed and interpreted unless otherwise noted).     Outside data reviewed   EKG: none this encounter        Electronically signed by  Tara Don PA-C

## 2022-04-14 VITALS
TEMPERATURE: 98.1 F | BODY MASS INDEX: 35.35 KG/M2 | RESPIRATION RATE: 20 BRPM | HEIGHT: 62 IN | DIASTOLIC BLOOD PRESSURE: 74 MMHG | WEIGHT: 192.1 LBS | OXYGEN SATURATION: 100 % | SYSTOLIC BLOOD PRESSURE: 126 MMHG | HEART RATE: 78 BPM

## 2022-04-14 LAB
GLUCOSE BLD-MCNC: 249 MG/DL (ref 70–108)
GLUCOSE BLD-MCNC: 274 MG/DL (ref 70–108)
MRSA SCREEN: NORMAL

## 2022-04-14 PROCEDURE — 2580000003 HC RX 258: Performed by: PHYSICIAN ASSISTANT

## 2022-04-14 PROCEDURE — 6370000000 HC RX 637 (ALT 250 FOR IP)

## 2022-04-14 PROCEDURE — 6370000000 HC RX 637 (ALT 250 FOR IP): Performed by: PHYSICIAN ASSISTANT

## 2022-04-14 PROCEDURE — 99239 HOSP IP/OBS DSCHRG MGMT >30: CPT | Performed by: HOSPITALIST

## 2022-04-14 PROCEDURE — 2700000000 HC OXYGEN THERAPY PER DAY

## 2022-04-14 PROCEDURE — 94640 AIRWAY INHALATION TREATMENT: CPT

## 2022-04-14 PROCEDURE — 6360000002 HC RX W HCPCS: Performed by: PHYSICIAN ASSISTANT

## 2022-04-14 PROCEDURE — 94760 N-INVAS EAR/PLS OXIMETRY 1: CPT

## 2022-04-14 PROCEDURE — 82948 REAGENT STRIP/BLOOD GLUCOSE: CPT

## 2022-04-14 RX ORDER — LIDOCAINE 4 G/G
1 PATCH TOPICAL DAILY
Qty: 1 BOX | Refills: 0 | Status: SHIPPED | OUTPATIENT
Start: 2022-04-15 | End: 2022-04-17

## 2022-04-14 RX ADMIN — INSULIN LISPRO 10 UNITS: 100 INJECTION, SOLUTION INTRAVENOUS; SUBCUTANEOUS at 11:42

## 2022-04-14 RX ADMIN — INSULIN LISPRO 3 UNITS: 100 INJECTION, SOLUTION INTRAVENOUS; SUBCUTANEOUS at 07:31

## 2022-04-14 RX ADMIN — INSULIN LISPRO 10 UNITS: 100 INJECTION, SOLUTION INTRAVENOUS; SUBCUTANEOUS at 07:32

## 2022-04-14 RX ADMIN — ENOXAPARIN SODIUM 40 MG: 100 INJECTION SUBCUTANEOUS at 08:48

## 2022-04-14 RX ADMIN — INSULIN LISPRO 2 UNITS: 100 INJECTION, SOLUTION INTRAVENOUS; SUBCUTANEOUS at 11:42

## 2022-04-14 RX ADMIN — FAMOTIDINE 20 MG: 20 TABLET ORAL at 08:49

## 2022-04-14 RX ADMIN — BUDESONIDE 500 MCG: 0.5 INHALANT RESPIRATORY (INHALATION) at 07:49

## 2022-04-14 RX ADMIN — SODIUM CHLORIDE, PRESERVATIVE FREE 10 ML: 5 INJECTION INTRAVENOUS at 08:49

## 2022-04-14 RX ADMIN — ASPIRIN 81 MG 81 MG: 81 TABLET ORAL at 08:48

## 2022-04-14 RX ADMIN — PREDNISONE 30 MG: 20 TABLET ORAL at 08:49

## 2022-04-14 RX ADMIN — ISODIUM CHLORIDE 3 ML: 0.03 SOLUTION RESPIRATORY (INHALATION) at 07:49

## 2022-04-14 RX ADMIN — METOPROLOL SUCCINATE 25 MG: 25 TABLET, EXTENDED RELEASE ORAL at 08:48

## 2022-04-14 RX ADMIN — DOCUSATE SODIUM 100 MG: 100 CAPSULE, LIQUID FILLED ORAL at 08:48

## 2022-04-14 RX ADMIN — GUAIFENESIN 600 MG: 600 TABLET, EXTENDED RELEASE ORAL at 08:48

## 2022-04-14 RX ADMIN — FUROSEMIDE 40 MG: 40 TABLET ORAL at 08:49

## 2022-04-14 ASSESSMENT — PAIN SCALES - GENERAL
PAINLEVEL_OUTOF10: 0
PAINLEVEL_OUTOF10: 0

## 2022-04-14 NOTE — CARE COORDINATION
4/14/22, 10:07 AM EDT  Plans home w spouse Denilson Flaherty, linda Hebert 6 cuffless fenestrated trach after RCP teaches family removal and replacement of trach tube; has [employer] home oxygen 2-3L, trach supplies; has [facility] (nursing, ST), current w [facility] Clinic; collaborated w Elizabeth Hall, Adriel Wheatley, [facility] Liaison, Attending, ENT, Britni, SERVANDO, SR RCP        Patient goals/plan/ treatment preferences discussed by  and . Patient goals/plan/ treatment preferences reviewed with patient/ family. Patient/ family verbalize understanding of discharge plan and are in agreement with goal/plan/treatment preferences. Understanding was demonstrated using the teach back method. AVS provided by RN at time of discharge, which includes all necessary medical information pertaining to the patients current course of illness, treatment, post-discharge goals of care, and treatment preferences.         IMM Letter  IMM Letter given to Patient/Family/Significant other/Guardian/POA/by[de-identified] Patient Access  IMM Letter date given[de-identified] 04/13/22  IMM Letter time given[de-identified] 5926 Spoke with mom. Mom has concerns of diarrhea. Mom feels pt may have a virus and would like to know how long it may last. Pt has no other symptoms. Explained to mom that each virus is different and needs to run its course. Mom is to continue with pedialyte/low sugar Gatorade. Starchy foods (Rice, oatmeal, breads). Avoid juice, fatty fried foods. If symptoms continue or worsen to call office back to schedule an appt. Mom verbalized understanding. No other questions or concerns at this time.

## 2022-04-14 NOTE — PROGRESS NOTES
Patient and  educated on all discharge instructions and plan of care. Encouraged questions. LDA at discharge includes trach that will be chronic along with all follow up information and supplies needed. Patient left in stable condition by wheelchair and is going home with .

## 2022-04-14 NOTE — PROGRESS NOTES
CLINICAL PHARMACY: DISCHARGE MED RECONCILIATION/REVIEW    [facility] Select Patient?: No  Total # of Interventions Recommended: 0   -   Total # Interventions Accepted: 0  Intervention Severity:   - Level 1 Intervention Present?: No   - Level 2 #: 0   - Level 3 #: 0   Time Spent (min): 15    Additional Documentation:    Rex CuellarD, BCPS   4/14/2022  1:45 PM

## 2022-04-14 NOTE — PROGRESS NOTES
Physician Progress Note      Molly Cordova  [facility] #:                  [id number]  :                       1950  ADMIT DATE:       2022 4:01 AM  DISCH DATE:  RESPONDING  PROVIDER #:        Teresita Lundberg          QUERY TEXT:    Dr Ezio Lang, Dr Ana Rocha,    Pt admitted with Acute respiratory failure. Per Otolaryngology Noted   documentation of trach became dislodged. If possible, please document in   progress notes and discharge summary:    The medical record reflects the following:  Risk Factors: Yesterday downsized her Shiley 6 trach to a Shiley 4 uncuffed   fenestrated trach. Clinical Indicators:  Per Otolaryngology she got home, her trach became   dislodged. They were unable to pass the suction through the trach. They   remove the trach hoping that would improve her breathing. Treatment: size 6 trach was inserted & utilizing 10L trach mask  Options provided:  -- Acute respiratory failure d/t dislodged trach confirmed present on   admission  -- Acute respiratory failure d/t dislodged trach ruled out  -- Other - I will add my own diagnosis  -- Disagree - Not applicable / Not valid  -- Disagree - Clinically unable to determine / Unknown  -- Refer to Clinical Documentation Reviewer    PROVIDER RESPONSE TEXT:    The diagnosis of Acute respiratory failure d/t dislodged trach confirmed   present on admission.     Query created by: Annabelle Coleman on 2022 9:06 AM      Electronically signed by:  Teresita Lundberg 2022 9:12 AM

## 2022-04-14 NOTE — DISCHARGE SUMMARY
Hospital Medicine Discharge Summary      Patient Identification:   Chung Dennis   : 1950  MRN: [id number]   Account: [de-identified]      Patient's PCP: Kavitha Dasilva Date: 2022     Discharge Date:   22    Admitting Physician: Chester Leroy MD     Discharge Physician: Aliya Sky MD       Hospital Course:   Chung Dennis is a 73 y.o. female admitted to [address] [place] [address] on 2022 with extensive otolaryngological and medical PMH as below who presents w/ AHRF 2/2 accidental removal of tracheostomy tube. Past History:  · Essential HTN  · Uncontrolled NIDDMT2 c/b diabetic neuropathy A1c 9.9% 21 on glipizide only. Refuses insulin injection therapy. · H/o COVID-19 infection requiring intubation c/b tracheal stenosis s/p suspension microlayngoscopy w/ relief of glottic stenosis / airway balloon dilation / laryngeal steroid injection 22  ? S/p tracheostomy 22 by ENT, s/p flexible nasolaryngoscopy and bronchoscopy w/ Dr. Carolan Bence w/ recovered movements of the glottis tolerating plugging trial  ? S/p repeat suspension laryngoscopy w/ depomedrol 80 mg/cc injection into cricoarytenoid joint tissue 22  ? Office visit w/ Dr. Lindajean Habermann, MD 22 - Downsized to 4 cuffless fenestrated trach without issue. · CAD s/p PCI w/ BMS to LAD in  on ASA 81 mg daily    The patient was stable for discharge - all consultants were contacted and in agreement with plan for discharge. Appropriate follow up appointment was arranged prior to discharge. Please see below or view chart for more details from hospital course. 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 Pt presented after tracheostomy was completely removed and pt sustained significant desaturation.    Plan:   -ENT consulted, appreciate recs    +Continue prednisone 30 mg daily    +Continue humidification per tracheostomy mask    +Continue tracheostomy teaching and encourage self care    +Continue tracheostomy management    +Continuous pulse oximetry as appropriate    +Pt will be discharged with Anup 6 trach and an extra tracheostomy tube    +F/u with ENT office.   -Aspiration precautions  -Wean O2 requirement as tolerated  -CTA Chest d/t concern for pneumomediastinum     #Uncontrolled NIDDMT2 c/b bilateral neuropathy A1c 9.9 in 12/21/21: Active  Takes glipizide only at home. Likely elevated A1c d/t chronic steroid use. Diabetic Nephropathy: no screening in chart  Diabetic Retinopathy: no screening in chart  Diabetic Neuropathy: Bilateral LE sensation diminished; burning sensation present, not currently on pharmacotherapy  Plan: Discharge with home diabetic regimen.      #Steroid-induced Leukocytosis: Stable  Edith concurrently w/ initiation of steroids. No s/s or evidence of infection at this time. Plan: Continue to monitor with CBC as appropriate     #Non-obstructive CAD s/p PCI w/ BMS of LAD in 2008: Stable  Takes ASA 81 mg daily  Plan: Continued.      #Chronic Systolic + Diastolic Heart Failure with EF 60% on 03/18/22, NYHA Class III 2/2 ICM: Stable  GDMT at home: none, started on metoprolol succinate 25 mg daily. Diuretics at home: furosemide. Follows in HF Clinic: No.   Plan: Continue metoprolol succinate 25 mg daily     #Acute on Chronic Anxiety: Stable  Started on LD Buspar  Plan: Continue Buspar    The patient was seen and examined on day of discharge and this discharge summary is in conjunction with any daily progress note from day of discharge.     Exam:     Vitals:  Vitals:    04/14/22 0347 04/14/22 0539 04/14/22 0749 04/14/22 0830   BP: 128/78   121/72   Pulse: 67   71   Resp: 18   20   Temp: 97.5 °F (36.4 °C)   98.1 °F (36.7 °C)   TempSrc: Oral   Oral   SpO2: 99% 99% 99% 100%   Weight:       Height:         Weight: Weight: 192 lb 1.6 oz (87.1 kg)     24 hour intake/output:    Intake/Output Summary (Last 24 hours) at 4/14/2022 1057  Last data filed at 4/14/2022 0830  Gross per 24 hour   Intake 1100 ml   Output --   Net 1100 ml     Physical Exam:  Constitutional:       General: She is not in acute distress.     Appearance: She is obese. She is not ill-appearing. HENT:      Head: Normocephalic and atraumatic.      Nose: No congestion or rhinorrhea.      Mouth/Throat:      Mouth: Mucous membranes are moist.      Pharynx: Oropharynx is clear. No oropharyngeal exudate or posterior oropharyngeal erythema. Eyes:      General: No scleral icterus.     Extraocular Movements: Extraocular movements intact.      Pupils: Pupils are equal, round, and reactive to light. Neck:      Trachea: Tracheostomy (good position, no erythema or purulence around incision site.) present. Cardiovascular:      Rate and Rhythm: Normal rate and regular rhythm.      Pulses: Normal pulses.      Heart sounds: Normal heart sounds. No murmur heard. No friction rub. No gallop.    Pulmonary:      Effort: Pulmonary effort is normal.      Breath sounds: No stridor. No wheezing. Abdominal:      General: Abdomen is flat.      Palpations: Abdomen is soft.      Tenderness: There is no abdominal tenderness. There is no guarding or rebound. Musculoskeletal:         General: Normal range of motion.      Right lower leg: No edema.      Left lower leg: No edema. Skin:     General: Skin is warm and dry.      Capillary Refill: Capillary refill takes less than 2 seconds. Neurological:      General: No focal deficit present.      Mental Status: She is alert and oriented to person, place, and time. Psychiatric:         Mood and Affect: Mood normal.         Behavior: Behavior normal.       Labs:  For convenience and continuity at follow-up the following most recent labs are provided:      CBC:    Lab Results   Component Value Date    WBC 12.4 04/13/2022    HGB 12.5 04/13/2022    HCT 39.5 04/13/2022     04/13/2022       Renal:    Lab Results   Component Value Date     04/13/2022    K 5.2 04/13/2022     04/13/2022    CO2 25 04/13/2022    BUN 18 04/13/2022    CREATININE 0.7 04/13/2022    CALCIUM 8.6 04/13/2022    PHOS 3.1 04/06/2022         Significant Diagnostic Studies    Radiology:   CTA CHEST W WO CONTRAST   Final Result   1. No pulmonary embolism. 2. Small left-sided pneumothorax. 3. Extensive pneumomediastinum. 4. Bilateral atelectasis/infiltrate. Critical findings were discussed with Rai Aparicio on 4/13/2022 at 1:50 PM.      Final report electronically signed by Dr. Kevin Moss on 4/13/2022 1:52 PM             Consults:     IP CONSULT TO OTOLARYNGOLOGY  IP CONSULT TO SOCIAL WORK    Disposition:    [x] Home w/ prior home health       [] TCU       [] Rehab       [] Psych       [] SNF       [] [place]       [] Other-    Condition at Discharge: Stable    Code Status:  Full Code     Patient Instructions:    Discharge lab work: none  Activity: activity as tolerated  Diet: ADULT DIET;  Regular; 4 carb choices (60 gm/meal)      Follow-up visits:   CM STR [facility]Critical access hospital  [address] [place] [address]  [phone]    As needed for nursing, [address] [place] [address]      [phone], option 4; , As needed for Shiley #6 uncuffed fenestrated trach, trach supplies, mist         Discharge Medications:        Medication List      START taking these medications    lidocaine 4 % external patch  Place 1 patch onto the skin daily for 2 days  Start taking on: April 15, 2022        CONTINUE taking these medications    albuterol sulfate  (90 Base) MCG/ACT inhaler  Inhale 2 puffs into the lungs every 6 hours as needed for Wheezing     aspirin 81 MG chewable tablet     budesonide 0.5 MG/2ML nebulizer suspension  Commonly known as: PULMICORT  Take 2 mLs by nebulization 2 times daily     diclofenac sodium 1 % Gel  Commonly known as: VOLTAREN  Apply 2 g topically 4 times daily as needed for Pain     docusate sodium 100 MG capsule  Commonly known as: COLACE  Take 1 capsule by mouth 2 times daily     famotidine 20 MG tablet  Commonly known as: PEPCID  Take 1 tablet by mouth 2 times daily     furosemide 40 MG tablet  Commonly known as: LASIX  Take 1 tablet by mouth daily     glipiZIDE 5 MG tablet  Commonly known as: GLUCOTROL  Take 2 tablets by mouth 2 times daily (before meals)     hydrOXYzine 25 MG capsule  Commonly known as: VISTARIL  Take 1 capsule by mouth 4 times daily as needed for Anxiety     insulin  UNIT/ML injection vial  Commonly known as: HUMULIN N;NOVOLIN N     Lantus SoloStar 100 UNIT/ML injection pen  Generic drug: insulin glargine     melatonin 3 MG Tabs tablet  Take 2 tablets by mouth nightly     metoprolol succinate 25 MG extended release tablet  Commonly known as: TOPROL XL  Take 1 tablet by mouth daily     miconazole 2 % powder  Commonly known as: MICOTIN  Apply topically 2 times daily.      nitroGLYCERIN 0.4 MG SL tablet  Commonly known as: NITROSTAT     OXYGEN  Inhale 2 L/min into the lungs continuous prn (during activities such as walking)     polycarbophil 625 MG tablet  Commonly known as: FIBERCON  Take 1 tablet by mouth daily     predniSONE 10 MG tablet  Commonly known as: DELTASONE  Take 3 tablets by mouth daily for 14 days     rosuvastatin 10 MG tablet  Commonly known as: CRESTOR     senna 8.6 MG tablet  Commonly known as: SENOKOT  Take 1 tablet by mouth 2 times daily as needed (Constipation)     sodium chloride nebulizer 0.9 % solution  Take 3 mLs by nebulization in the morning, at noon, and at bedtime           Where to Get Your Medications      These medications were sent to [address] , [address] [place] [address] [phone] [address], [place] [address] [address]    Phone: [phone]   · lidocaine 4 % external patch         Time Spent on discharge is roughly 35 minutes in the examination, evaluation, counseling and review of medications and discharge plan. Thank you Bruna dEwards for the opportunity to be involved in this patient's care.     Signed:    Electronically signed by Shankar Mathis MD on 4/14/2022 at 10:57 AM

## 2022-04-14 NOTE — PLAN OF CARE
Problem: Falls - Risk of:  Goal: Will remain free from falls  Description: Will remain free from falls  Outcome: Ongoing  Note: Call light within reach, bed alarm on, grippy socks on.    Goal: Absence of physical injury  Description: Absence of physical injury  Outcome: Ongoing     Problem: Skin Integrity:  Goal: Will show no infection signs and symptoms  Description: Will show no infection signs and symptoms  Outcome: Ongoing  Goal: Absence of new skin breakdown  Description: Absence of new skin breakdown  Outcome: Ongoing     Problem: Discharge Planning:  Goal: Discharged to appropriate level of care  Description: Discharged to appropriate level of care  Outcome: Ongoing

## 2022-04-15 LAB — MRSA SCREEN: NORMAL

## 2022-04-20 ENCOUNTER — TELEPHONE (OUTPATIENT)
Dept: ALLERGY | Age: 72
End: 2022-04-20

## 2022-04-20 NOTE — TELEPHONE ENCOUNTER
Patient's , Sherren Dolores called in stating patient is currently taking 20 mg of prednisone at breakfast. Sherren Dolores states that patient's blood sugar spikes and has been over 400 at times. He wants to know if patient can take one in the am and one in the pm. He also said patient's feet have been very swollen and she is retaining water. Patient is on Lasix. Is there anything they can do for that. I placed Gonzalo on hold and called ARIELLE Chao. Zhanna said patient can go down to taking only one prednisone in the morning to see if that helps with her blood sugar. She said as far as the retaining water and her feet swelling patient needs to watch her sodium intake, weigh herself daily and continue taking her Lasix. Zhanna also suggested patient's  call the PCP to make them aware of the swelling feet. Informed patient's  of all that Zhanna said. Sherren Dolores said he wrote everything down. Patient's husban verbalized understanding and thanked me.

## 2022-04-25 ENCOUNTER — TELEPHONE (OUTPATIENT)
Dept: ENT CLINIC | Age: 72
End: 2022-04-25

## 2022-04-25 DIAGNOSIS — Z93.0 TRACHEOSTOMY DEPENDENCE (HCC): ICD-10-CM

## 2022-04-25 DIAGNOSIS — J38.6 POSTERIOR GLOTTIC STENOSIS: Primary | ICD-10-CM

## 2022-04-25 DIAGNOSIS — J38.3 VOCAL CORD DYSFUNCTION: ICD-10-CM

## 2022-04-25 RX ORDER — BUDESONIDE 0.5 MG/2ML
0.5 INHALANT ORAL 2 TIMES DAILY
Qty: 60 EACH | Refills: 3 | Status: SHIPPED | OUTPATIENT
Start: 2022-04-25 | End: 2022-04-25

## 2022-04-25 RX ORDER — PREDNISONE 10 MG/1
TABLET ORAL
Qty: 30 TABLET | Refills: 0 | Status: SHIPPED | OUTPATIENT
Start: 2022-04-25 | End: 2022-06-29

## 2022-04-25 RX ORDER — SODIUM CHLORIDE FOR INHALATION 0.9 %
3 VIAL, NEBULIZER (ML) INHALATION 3 TIMES DAILY
Qty: 270 ML | Refills: 0 | Status: SHIPPED | OUTPATIENT
Start: 2022-04-25 | End: 2022-04-25

## 2022-04-25 RX ORDER — BUDESONIDE 0.5 MG/2ML
0.5 INHALANT ORAL 2 TIMES DAILY
Qty: 60 EACH | Refills: 3 | Status: SHIPPED | OUTPATIENT
Start: 2022-04-25 | End: 2022-06-29

## 2022-04-25 RX ORDER — SODIUM CHLORIDE FOR INHALATION 0.9 %
3 VIAL, NEBULIZER (ML) INHALATION 3 TIMES DAILY
Qty: 270 ML | Refills: 0 | Status: SHIPPED | OUTPATIENT
Start: 2022-04-25 | End: 2022-05-25

## 2022-04-25 NOTE — TELEPHONE ENCOUNTER
Patient  called and stated that the patient is about out of her budesonide  0.5 mg/2 ml he stated that he thought the hospital was sending more then she was seen on 4/13/22 but [facility] is stating they don't have the prescription. Patient will also need a refill on the sodium chloride 0.9%. They also wanted to know if the need a refill on the prednisone or how long patient needs to take it. Please advise.

## 2022-04-25 NOTE — TELEPHONE ENCOUNTER
Refills sent for budesonide, saline nebs and for the prednisone. She will stay on the 10mg daily dose at this time. We will discuss weaning at future outpatient visits depending on how she is doing.

## 2022-04-27 ENCOUNTER — OFFICE VISIT (OUTPATIENT)
Dept: ENT CLINIC | Age: 72
End: 2022-04-27
Payer: MEDICARE

## 2022-04-27 VITALS
BODY MASS INDEX: 34.96 KG/M2 | SYSTOLIC BLOOD PRESSURE: 128 MMHG | WEIGHT: 190 LBS | HEART RATE: 86 BPM | HEIGHT: 62 IN | DIASTOLIC BLOOD PRESSURE: 80 MMHG | OXYGEN SATURATION: 99 % | TEMPERATURE: 96.4 F

## 2022-04-27 DIAGNOSIS — Z93.0 TRACHEOSTOMY DEPENDENCE (HCC): ICD-10-CM

## 2022-04-27 DIAGNOSIS — J38.6 POSTERIOR GLOTTIC STENOSIS: Primary | ICD-10-CM

## 2022-04-27 DIAGNOSIS — R49.0 HOARSENESS: ICD-10-CM

## 2022-04-27 DIAGNOSIS — J38.00 VOCAL CORD PARALYSIS: ICD-10-CM

## 2022-04-27 PROCEDURE — 31615 TRCHEOBRNCHSC EST TRACHS INC: CPT | Performed by: OTOLARYNGOLOGY

## 2022-04-27 PROCEDURE — 31575 DIAGNOSTIC LARYNGOSCOPY: CPT | Performed by: OTOLARYNGOLOGY

## 2022-04-27 PROCEDURE — 99024 POSTOP FOLLOW-UP VISIT: CPT | Performed by: OTOLARYNGOLOGY

## 2022-04-27 NOTE — PROGRESS NOTES
[facility]    HPI   It was a pleasure to see Karla Syed, a 73 y.o. female, who returns for continued evaluation of her airway. She is doing well with her current trach. At her last visit in the office I switched her from a 6 Shiley to a 4 Shiley. Later that day at home, she felt like it was difficult to breathe. They removed her trach at that time. She then went to the emergency room. Her oxygen tank ran out and on the way there. Her saturations were in the 40s when she got to the emergency room. A 6 Shiley trach was placed at that hospital, and her breathing was fine thereafter. Objective     Vitals:    04/27/22 1141   BP: 128/80   Pulse: 86   Temp: 96.4 °F (35.8 °C)   SpO2: 99%        EXAM FINDINGS: Trach in good position well seated against her anterior neck. She speaks well with finger occlusion. She seems to tolerate breathing during finger occlusion for brief periods of time also. Bilateral pedal edema. Procedure performed: Flexible Nasolaryngoscopy 51502  Attending: Francis Gleason MD  Anesthesia: Topical lidocaine 4% with oxymetazoline  Blood loss: None  Specimens and Cultures: None  Procedure: The patient was taken to the procedure room and placed upright in the chair. Local anesthesia was administered to the bilateral nasal passageways. A flexible nasolaryngoscope was then passed through the nose and the hypopharynx and larynx were examined. Findings:            NASAL CAVITY/NASOPHARYNX: No masses/lesions/polyposis/mucosal ulceration,  Bilateral eustachian tube orifices non-obstructed. OROPHARYNX: BOT, vallecula, posterior tonsillar pillars w/o masses/lesions/mucosal ulceration. No RP bulging. HYPOPHARYNX: No masses/lesions/mucosal ulceration of piriform sinuses or post-cricoid region. No significant pooling of secretions.             GLOTTIS/SUPRAGLOTTIS: Lingual & laryngeal surface of epiglottis, AE folds, arytenoids, false vocal folds w/o masses/lesions/mucosal ulceration. Both true vocal folds appear a little edematous today. They adduct well. Abduction is limited to about 2 mm, which is less than her prior exam.  Her true vocal folds are significantly more edematous today then when she was scoped before, which may explain the lessening of her glottic aperture. Tracheoscopy:  Procedure: The flexible endoscope was then advanced through her Shiley 6 cuffed trach. The below findings were noted. The scope was then withdrawn atraumatically. Findings: Her trach is sitting in a good position in the middle of her airway. The trachea mucosa down to the telma is normal without mucosal ulceration or thick secretions. Patient tolerated the procedure well and without complications. Post procedure condition is stable. 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 female with:   1. Posterior glottic stenosis    2. Tracheostomy dependence ([address]    3. Vocal cord paralysis    4. Hoarseness       She is doing well with her trach, and I did not make any changes today. I encouraged them to reach out to their durable medical equipment company to get an additional inner cannula to use. I also think the [employer] HME filters would work better than her current trach filter because it can be finger occluded to allow her to talk. Speech therapy can help her order these. She is getting speech therapy twice weekly and I encouraged her to continue this. I will see her back in 3 to 4 weeks for another assessment. We discussed her feet and ankle swelling. She is on a fluid pill. She was wondering if she should increase the dose. She drinks quite a lot of water per day, because she was told this would help her throat. I tried to discuss the competing goals of keeping her secretions thin with regulating her body's fluid content.   She is going to ask her PCP about changing the dose of her diuretic. I do think that her fluid overload could be responsible for her vocal fold swelling seen on today's exam.         Thank you for involving me in this patient's care. Please do not hesitate to call with any questions or concerns. Sincerely,    Roxane Cochran M.D. Otolaryngology-Head and Neck Surgery    **This report has been created using voice recognition software. It may contain minor errors which are inherent in voice recognition technology. **

## 2022-04-27 NOTE — TELEPHONE ENCOUNTER
Patient's spouse stated the pharmacy said the insurance is not covering the budesonide. I have checked CoverMyMeds and they are stating the Rx needs to go through Medicare Part B since it is used at home with Durable Medical Equipment. I called the pharmacy and spoke to Marci Guevara. Marci Guevara stated Medicare would not approve the Rx with the Dx the medication was sent attached to. Can we please send a new Rx with a different Dx to try to get approved if patient still needs to have it?

## 2022-04-29 ENCOUNTER — TELEPHONE (OUTPATIENT)
Dept: ENT CLINIC | Age: 72
End: 2022-04-29

## 2022-04-29 DIAGNOSIS — J38.3 VOCAL CORD DYSFUNCTION: ICD-10-CM

## 2022-04-29 DIAGNOSIS — J38.00 VOCAL CORD PARALYSIS: ICD-10-CM

## 2022-04-29 DIAGNOSIS — J38.6 POSTERIOR GLOTTIC STENOSIS: Primary | ICD-10-CM

## 2022-04-29 NOTE — TELEPHONE ENCOUNTER
Patients   called asking about the pulse concentrator from the medical supply. They are saying that she cant use the pulse concentrator while wearing a trach, it has be a canula through the nose. They said that it senses when you are breathing so it wouldn't run properly. I talked to Dr Graham Pair he said it would be best to talk to whoever gave it to her about how to properly use it. I called and let patient know. He is also wanting an RX for another shiley #6 trach non disposable canula. He called company and they said he needs an order.  Fax RX to [facility]    Please advise

## 2022-05-02 LAB
FUNGUS IDENTIFIED: NORMAL
FUNGUS SMEAR: NORMAL

## 2022-05-02 NOTE — TELEPHONE ENCOUNTER
Do you want patient to continue on budesonide nebs? We are having issues getting approved through insurance. She is on prednisone 10mg daily.

## 2022-05-02 NOTE — TELEPHONE ENCOUNTER
I don't really care at this point. I think Dr. Yaa Mooney is the one that initially wanted that. She can be his patient from now on.

## 2022-05-02 NOTE — TELEPHONE ENCOUNTER
Pt is wanting to get her first covid vaccine, is there reason why she shouldn't, medical?    Please advise

## 2022-05-02 NOTE — TELEPHONE ENCOUNTER
I have sent the medication twice now and changed diagnoses. It was covered upon discharge from the hospital the first time, so I am not sure what the issue is now. The pharmacy needs to let us know what the difference is in scripts (how it was ordered, diagnoses, what piece of insurance ran through, etc) so we can figure out what is needed to push it through, otherwise it is a guessing game on our end and delays care for the patient.

## 2022-05-02 NOTE — TELEPHONE ENCOUNTER
I will ask Dr BROUSSARD about what to do with the budesonide tomorrow. I placed order for additional trach - please fax.

## 2022-05-03 NOTE — TELEPHONE ENCOUNTER
Discussed with Dr Prince Kapadia to stop the budesonide nebulizers, continue on the prednisone 10mg daily for now. Change follow up appt to be with Dr Miroslava Crenshaw.

## 2022-05-09 ENCOUNTER — TELEPHONE (OUTPATIENT)
Dept: ENT CLINIC | Age: 72
End: 2022-05-09

## 2022-05-09 DIAGNOSIS — J38.3 VOCAL CORD DYSFUNCTION: Primary | ICD-10-CM

## 2022-05-09 DIAGNOSIS — J38.6 POSTERIOR GLOTTIC STENOSIS: ICD-10-CM

## 2022-05-09 NOTE — TELEPHONE ENCOUNTER
Patients  called and left a message regarding patients trach supplies. He would like a call back. I called patients  and the voice mail was full could not leave msg. I will try to call back later.

## 2022-05-09 NOTE — TELEPHONE ENCOUNTER
HPI:  Pt here for URI & rash.. Started last Wednesday has had nasal congestion, fever & chills. Has been taking Claritin. On Saturday she developed a warm rash on her face. Her congestion was better by then. She also developed back pain, and took 2 tylenol. The rash has since spread to her arms, legs, and torsos. Fever and chills have resolved. No new medications or recent changes.  Monday her sinus congestion returned. Has not had a fever since last week Friday. Pt has been feeling fatigued, but attributes this to her low BP. Takes care of her grandchildren, there were sick contacts at her son's house/ grandchildrens.     Rash is mildly itchy. Took Benadryl, which helped. Started on the face and then spread to arms first, then torso, and legs. She feels it is improving. No bumps.     Has been eating and drinking well. No nausea/ vomitting, diarrhea.  No cough, phlegm, CP.     -repeat BP 82/58. Pt did take her Losartan 12.5mg qd, Carvedilol 12.5mg q12h (changed in December to 12.5mg q12h from 25mg q12h.)        Patient's medications, allergies, past medical, surgical, social and family histories were reviewed and updated as noted in the chart.    ROS:  Review of Systems   Constitutional: Positive for chills, fatigue and fever. Negative for unexpected weight change.   HENT: Positive for congestion and sneezing.    Respiratory: Negative for cough and shortness of breath.    Cardiovascular: Negative for chest pain, palpitations and leg swelling.   Gastrointestinal: Negative for abdominal distention, abdominal pain, constipation, diarrhea, nausea and vomiting.   Genitourinary: Negative for dysuria.   Skin: Positive for rash.   Neurological: Negative for dizziness, light-headedness and headaches.   Psychiatric/Behavioral: Negative for agitation and behavioral problems.       PMHx:  Past Medical History:   Diagnosis Date   • CHF (congestive heart failure) (CMS/HCC)    • Essential hypertension, benign    • High cholesterol  Her , Bailey Mckinnon, called back and stated that he would like another order sent to [facility] at [phone]. He stated that he would like 5-6 more non-disposable inner cannulas and therapist recommended the HME filter made by [employer].    • Hyperlipidemia    • S/P MVR (mitral valve repair) 1/23/2017       PSHx:  Past Surgical History:   Procedure Laterality Date   • Biopsy of breast, incisional  11/20/00    Left Breast Biopsy.  Benign fibroadenoma.  Dr. Najera.  [facility]   • Cardiac catherization  01/20/2017    Pre-op: 3+ MR into dilated LA. No CAD. EF 35%. LVEDP 16 mmHg. PA 60/34/38. PCW 28/41/22. CO 3.57 l/m   • Colonoscopy remove lesion by snare  10/6/11    Dr. Hoyos, Diverticulosis/Polyps, F/U 7 years   • Foot/toes surgery proc unlisted  1/93    Closed reduction and percutaneous pinning of MTP joint, right third toe.  Dr. Modi.  [facility]   • Insert intrauterine device  6-10-89    Copper-7 IUD removed and ParaGard IUD inserted. This was her fouth IUD.   Dr. Werner.   • Laparoscopy,tubal cautery      Tubal Ligation   • Ligatn/strip long or short saphen      Vein Stripping right leg.   • Total abdom hysterectomy  11/17/98    ALAYNA w BSO for leiomyomata uterus, pelvic adhesions, indwelling IUD.  Dr. Werner.  [facility]       Fam Hx:  Family History   Problem Relation Age of Onset   • Hypertension Mother    • Heart Mother         possibly from a valve   • Patient is unaware of any medical problems Father    • Hypertension Brother    • Cancer, Colon Neg Hx    • Cancer, Prostate Neg Hx    • Bilateral breast cancer Neg Hx    • Cancer, Ovarian Neg Hx        Social Hx:  Social History     Tobacco Use   • Smoking status: Never Smoker   • Smokeless tobacco: Never Used   Substance Use Topics   • Alcohol use: Not Currently     Alcohol/week: 0.0 - 3.0 standard drinks     Frequency: 2-3 times a week     Drinks per session: 1 or 2     Binge frequency: Never     Comment: occ   • Drug use: No       Meds:  Current Outpatient Medications   Medication Sig Dispense Refill   • carvedilol (COREG) 12.5 MG tablet Take 1 tablet by mouth 2 times daily (with meals). 60 tablet 3   • losartan (COZAAR) 25 MG tablet Take 0.5 tablets by mouth daily. 30 tablet 3   • simvastatin  (ZOCOR) 20 MG tablet TAKE ONE TABLET BY MOUTH ONE TIME DAILY  90 tablet 0   • aspirin 81 MG chewable tablet Chew 1 tablet by mouth daily.       No current facility-administered medications for this visit.        Allergies:  ALLERGIES: no known allergies.        Objective:  Vitals:    03/03/20 1117   BP: (!) 82/58   Pulse:    Temp:      Physical Exam  Vitals signs and nursing note reviewed.   Constitutional:       General: She is not in acute distress.  HENT:      Head: Normocephalic and atraumatic.   Eyes:      Pupils: Pupils are equal, round, and reactive to light.   Cardiovascular:      Rate and Rhythm: Normal rate and regular rhythm.      Heart sounds: Normal heart sounds. No murmur. No friction rub. No gallop.    Pulmonary:      Effort: Pulmonary effort is normal. No respiratory distress.      Breath sounds: Normal breath sounds. No wheezing or rales.   Abdominal:      General: Bowel sounds are normal.      Palpations: Abdomen is soft.      Tenderness: There is no tenderness.   Musculoskeletal: Normal range of motion.   Skin:     General: Skin is warm and dry.   Neurological:      Mental Status: She is alert and oriented to person, place, and time.   Psychiatric:         Behavior: Behavior normal.         Thought Content: Thought content normal.         Judgment: Judgment normal.           Assessment/Plan:  Problem List Items Addressed This Visit     Rash    Hypotension     -repeat BP still low. Pt defers going to the ED. States she has been eating and drinking well today.  -reduced Carvedilol from 12.5mg q12h to 6.25mg q12h starting this evening  -check BP in the am; if SBP <90 do not take Losartan that day  -if SBP <90 again tmrw morning call office --> will likely need to go to the ED.  -will check BMP today to make sure she odes not have pre-renal SERAFIN from hypotension  -pt wrote down the instructions for the above plan and is agreeable/ understand         Relevant Orders    BASIC METABOLIC PANEL    Viral  upper respiratory tract infection - Primary     Likely Parvovirus B19  -cont good PO intake of Gatorade or Pedialyte  -pt drinks a lot of water, advised to limit PO intake to <2.5L  -check daily weight, if gains 3lbs or more call this office or Dr. Lupillo Frias's office for possible diuretics.             Recent Labs and Imaging reviewed with patient to ensure understanding.  Patient educated on medications; timing and dosages reviewed.  Discussed w/ pt who understands and agrees w/ the plan.    Discussed w/ Dr. KULWINDER Larry, DO  Dr. Nita Larry (Resident) and I performed a history and physical exam of the patient and we discussed the management plan.  I reviewed the Resident’s note and agree with the documented findings and plan of care.  She seems to be mentating normally.  She is perfusing extremities are warm and dry.  Psych has a viral infection with some vasodilatation so adjust meds accordingly.  We will check a BMP to make sure she is perfusing her kidneys okay    Carlo Li MD

## 2022-05-10 NOTE — TELEPHONE ENCOUNTER
Order placed. It needs printed/faxed to [facility].  I ordered 6, but Medicare has limits on allowable amounts, so they may not fill it.

## 2022-05-10 NOTE — TELEPHONE ENCOUNTER
Please clarify if they currently have the new style or the old style Shiley tracheostomy. The inner cannulas are different. It should be a #6 in size.

## 2022-05-10 NOTE — TELEPHONE ENCOUNTER
Dr Butch Luna has an order for the size 6 cannulas scanned in media but he only did 1 a month and pt wants 5 or 6 size 6  reusable cannuals.

## 2022-05-11 ENCOUNTER — TELEPHONE (OUTPATIENT)
Dept: ENT CLINIC | Age: 72
End: 2022-05-11

## 2022-05-11 NOTE — TELEPHONE ENCOUNTER
He said besides the inner cannulas those filters are all she needs.  We already have RX for cannulas

## 2022-05-11 NOTE — TELEPHONE ENCOUNTER
Can you check with  and see if they need refills on any other supplies so I can send one order over rather than multiple?

## 2022-05-13 NOTE — TELEPHONE ENCOUNTER
I talked to Ayden Tovar and he is wanting the size 6 CFN style. I also spoke with Cass Vera and pt is only eligible to 1 cannula  every 3 mo. He said they reusable that we previously sent in doesn't fit her trach and she doesn't like the disposable style. She also needs the filter.

## 2022-05-16 ENCOUNTER — TELEPHONE (OUTPATIENT)
Dept: ENT CLINIC | Age: 72
End: 2022-05-16

## 2022-05-16 NOTE — TELEPHONE ENCOUNTER
Patient's spouse, Laurie Evans, left message on clinical voicemail stating the speech therapist suggested patient could benefit from seeing a Pulmonary Therapist. Patient would like to go to [facility] in [facility] if Dr Brittanie Buenrostro is agreeable. Patient has appointment on 05/23/22. Please advise.

## 2022-05-18 NOTE — TELEPHONE ENCOUNTER
Okay.  I just don't know what referral exactly he is asking for. Pulmonary rehab? That sounds most appropriate.

## 2022-05-18 NOTE — TELEPHONE ENCOUNTER
Called pt and Agata Brannon stated that when pt gets up and active here oxygen level drops down in the 80s and when she sits for a few minutes she's fine again.

## 2022-05-23 ENCOUNTER — OFFICE VISIT (OUTPATIENT)
Dept: ENT CLINIC | Age: 72
End: 2022-05-23
Payer: MEDICARE

## 2022-05-23 VITALS
WEIGHT: 193 LBS | DIASTOLIC BLOOD PRESSURE: 84 MMHG | HEART RATE: 80 BPM | BODY MASS INDEX: 35.51 KG/M2 | TEMPERATURE: 96.6 F | OXYGEN SATURATION: 99 % | HEIGHT: 62 IN | SYSTOLIC BLOOD PRESSURE: 126 MMHG

## 2022-05-23 DIAGNOSIS — J38.6 POSTERIOR GLOTTIC STENOSIS: Primary | ICD-10-CM

## 2022-05-23 DIAGNOSIS — Z93.0 TRACHEOSTOMY DEPENDENCE (HCC): ICD-10-CM

## 2022-05-23 DIAGNOSIS — R49.0 HOARSENESS: ICD-10-CM

## 2022-05-23 PROCEDURE — 31575 DIAGNOSTIC LARYNGOSCOPY: CPT | Performed by: OTOLARYNGOLOGY

## 2022-05-23 PROCEDURE — G8417 CALC BMI ABV UP PARAM F/U: HCPCS | Performed by: OTOLARYNGOLOGY

## 2022-05-23 PROCEDURE — G8427 DOCREV CUR MEDS BY ELIG CLIN: HCPCS | Performed by: OTOLARYNGOLOGY

## 2022-05-23 PROCEDURE — 3017F COLORECTAL CA SCREEN DOC REV: CPT | Performed by: OTOLARYNGOLOGY

## 2022-05-23 PROCEDURE — 31615 TRCHEOBRNCHSC EST TRACHS INC: CPT | Performed by: OTOLARYNGOLOGY

## 2022-05-23 PROCEDURE — 1123F ACP DISCUSS/DSCN MKR DOCD: CPT | Performed by: OTOLARYNGOLOGY

## 2022-05-23 PROCEDURE — G8400 PT W/DXA NO RESULTS DOC: HCPCS | Performed by: OTOLARYNGOLOGY

## 2022-05-23 PROCEDURE — 99215 OFFICE O/P EST HI 40 MIN: CPT | Performed by: OTOLARYNGOLOGY

## 2022-05-23 PROCEDURE — 1036F TOBACCO NON-USER: CPT | Performed by: OTOLARYNGOLOGY

## 2022-05-23 PROCEDURE — 1090F PRES/ABSN URINE INCON ASSESS: CPT | Performed by: OTOLARYNGOLOGY

## 2022-05-23 NOTE — PROGRESS NOTES
[facility] [place] [facility]  [facility]  [facility] [place]  Dept: [phone]  Dept Fax: [phone]  Loc: [phone]    Varinder Howell is a 73 y.o. female who was referred by No ref. provider found for:  Chief Complaint   Patient presents with    Follow-up     Patient is here for a follow up. She said that if she talks to much she gets a scratchy throat, foggy, and lightheaded. She states that she is foggy headed most of the time, but the O2 levels are good. She still feels like something is stuck in her throat. She said she has 3 teeth that are going bad can she get them fixed or wait? HPI:     Varinder Howell is a 73 y.o. 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 Kaiser Rosa and myself in an effort to maintain her glottic aperture and enable decannulation. The patient returns to clinic after an interval of near death/suffocation that resulted from her 's assistance with her tracheostomy and his inability to replace it causing the patient to go unconscious as he drove her to an emergency department where they were successful at replacing the cannula. The patient fortunately was able to breathe enough that she did not have significant brain damage from that event and now returns today with her  with the hopes of getting guidance regarding her subsequent care in this process of eventual decannulation. Somehow the patient's  succeeded in ordering to tracheostomy cannula's from a local provider that he thought may be easier to take care of than the one that she previously had. He ordered a #6 fenestrated cannula which was subsequently placed at the outside hospital.  He did so in order to have a second inner cannula to use in the event that     History:      Allergies   Allergen Reactions    Metformin And Related Other (See Comments)     diarrhea    Codeine Nausea And Vomiting    Meperidine Hcl Nausea And Vomiting    Sulfa Antibiotics Nausea And Vomiting    Sumatriptan Nausea And Vomiting     Current Outpatient Medications   Medication Sig Dispense Refill    predniSONE (DELTASONE) 10 MG tablet Take 1 (one) tablet by mouth once daily. 30 tablet 0    budesonide (PULMICORT) 0.5 MG/2ML nebulizer suspension Take 2 mLs by nebulization 2 times daily 60 each 3    sodium chloride nebulizer 0.9 % solution Take 3 mLs by nebulization in the morning, at noon, and at bedtime 270 mL 0    LANTUS SOLOSTAR 100 UNIT/ML injection pen 20 units in PM      insulin NPH (HUMULIN N;NOVOLIN N) 100 UNIT/ML injection vial Inject into the skin 2 times daily (before meals) Takes BID on the SS      metoprolol succinate (TOPROL XL) 25 MG extended release tablet Take 1 tablet by mouth daily 30 tablet 3    hydrOXYzine (VISTARIL) 25 MG capsule Take 1 capsule by mouth 4 times daily as needed for Anxiety 60 capsule 1    albuterol sulfate  (90 Base) MCG/ACT inhaler Inhale 2 puffs into the lungs every 6 hours as needed for Wheezing 18 g 3    miconazole (MICOTIN) 2 % powder Apply topically 2 times daily.  45 g 1    diclofenac sodium (VOLTAREN) 1 % GEL Apply 2 g topically 4 times daily as needed for Pain 150 g 3    furosemide (LASIX) 40 MG tablet Take 1 tablet by mouth daily 60 tablet 3    docusate sodium (COLACE) 100 MG capsule Take 1 capsule by mouth 2 times daily 60 capsule 3    polycarbophil (FIBERCON) 625 MG tablet Take 1 tablet by mouth daily  4    senna (SENOKOT) 8.6 MG tablet Take 1 tablet by mouth 2 times daily as needed (Constipation) 60 tablet 1    melatonin 3 MG TABS tablet Take 2 tablets by mouth nightly 60 tablet 3    famotidine (PEPCID) 20 MG tablet Take 1 tablet by mouth 2 times daily 60 tablet 3    OXYGEN Inhale 2 L/min into the lungs continuous prn (during activities such as walking) 1 Canister 5    rosuvastatin (CRESTOR) 10 MG tablet Take 10 mg by mouth daily      nitroGLYCERIN (NITROSTAT) 0.4 MG SL tablet Place 0.4 mg under the tongue every 5 minutes as needed for Chest pain up to max of 3 total doses. If no relief after 1 dose, call 911.  aspirin 81 MG chewable tablet Take 81 mg by mouth daily       No current facility-administered medications for this visit. Past Medical History:   Diagnosis Date    Coronary artery disease     COVID     Primary hypertension     Type 2 diabetes mellitus ([facility] [address]       Past Surgical History:   Procedure Laterality Date    CARDIAC SURGERY      CATARACT REMOVAL Bilateral      SECTION      3 times    CORONARY ANGIOPLASTY WITH STENT PLACEMENT      stenting twice    EYE SURGERY      HIATAL HERNIA REPAIR      late     HYSTERECTOMY, TOTAL ABDOMINAL      in     LARYNGOSCOPY N/A 3/22/2022    SUSPENSON LARYNGOSCOPY WITH JET VENT, DILATION performed by Zaheer Burgos MD at [address] [place] [address] E 3/28/2022    SUSPENSION MICROLARYNGOSCOPY WITH BALLOON DILATION AND JET VENT, KENALOG INJECTION performed by Zaheer Burgos MD at [address] [place] [address] E 2022    Suspension Laryngoscopy  Lateral of Right True Vocal Cord, With Steroid Injection of Larynx And Tracheostomy Tube Change, debridement performed by Germán Ogden MD at [address] [place] [address]      in [place] [address] N/A 2022    TRACHEOTOMY TUBE REPLACEMENT performed by Zaheer Burgos MD at [address] [place] [address] History   Problem Relation Age of Onset    Parkinson's Disease Father     Lung Cancer Father      Social History     Tobacco Use    Smoking status: Never Smoker    Smokeless tobacco: Never Used   Substance Use Topics    Alcohol use: Not Currently     Comment: drinks 1 glass of wine cooler or wine about 3 times per year        Subjective:      Review of Systems  Rest of review of systems are negative, except as noted in HPI.      Objective:     BP 126/84 (Site: Right Upper Arm, Position: Sitting)   Pulse 80   Temp 96.6 °F (35.9 °C) (Infrared)   Ht 5' 2\" (1.575 m)   Wt 193 lb (87.5 kg)   LMP  (LMP Unknown)   SpO2 99%   BMI 35.30 kg/m²     Physical Exam       On general physical exam the patient is pleasant alert and cooperative moderately obese older middle-aged adult female in no acute distress. She is capable of phonating with relative comfort by digital occlusion of her tracheostomy cannula. She is not able to inhale easily through the stoma with digital occlusion but could get full breaths in from above at about twice the time interval of normal inhalation if she remained calm. Her voice was markedly low in pitch, moderately pressed in quality and moderately raspy for her age and gender. I heard some turbulence with deep respiration and the tracheostomy cannula opened indicating secretions within the airway. There was no malodor at the tracheostomy site and no signs of recent bleeding on her dressing. Procedure: Flexible laryngoscopy  Findings: The patient's larynx was well visualized and noted to be abnormal for the presence of bilateral true vocal fold motion restriction with the left cord being across the midline but being mobile to approximately 30% of normal.  The right side appeared also to be hypomobile but likely as a result of the encroachment of the left side beyond the midline. Both cords were polypoid. The anterior commissure was sharp. There was no pooling of the piriform sinus or in the postcricoid space. Procedure: Transabdominal bronchoscopy and trach tube change  Findings: The patient's stoma remained open so that I could endoscopically evaluated after I anesthetized the stoma and the patient's tracheostomy tract. She had a very long suprastomal shelf that was significantly encroaching on the airway. There were 2 naya of granuloma-like tissue at 9 and 3:00 within the suprastomal airway.   The posterior commissure was visualized. It was not overtly webbed but the cords were motion restricted. They both appear to be moving just were not able to AB duct sufficiently well to open the airway widely. The para and infra stomal airway were both widely patent. The distal trachea was free of ulceration and granuloma formation. There was no impending mucous plug in either mainstem bronchus. I replaced the cannula with the assistance of the patient's  instructing him on how to do it safely and seeing him do it personally without difficulty. Flexible laryngoscopy and transabdominal bronchoscopy images:        Vitals reviewed. No results found. Lab Results   Component Value Date     04/13/2022     04/08/2022     04/07/2022    K 5.2 04/13/2022    K 5.5 04/08/2022    K 4.6 04/07/2022     04/13/2022     04/08/2022     04/07/2022    CO2 25 04/13/2022    CO2 23 04/08/2022    CO2 25 04/07/2022    BUN 18 04/13/2022    BUN 21 04/08/2022    BUN 23 04/07/2022    CREATININE 0.7 04/13/2022    CREATININE 0.7 04/08/2022    CREATININE 0.6 04/07/2022    CALCIUM 8.6 04/13/2022    CALCIUM 8.6 04/08/2022    CALCIUM 9.1 04/07/2022    PROT 7.6 03/18/2022    PROT 4.9 01/15/2022    PROT 5.8 12/21/2021    LABALBU 4.3 03/18/2022    LABALBU 3.3 01/15/2022    LABALBU 3.3 12/21/2021    BILITOT 0.2 03/18/2022    BILITOT 0.5 01/15/2022    BILITOT 0.3 12/21/2021    ALKPHOS 101 03/18/2022    ALKPHOS 78 01/15/2022    ALKPHOS 67 12/21/2021    AST 16 03/18/2022    AST 30 01/15/2022    AST 33 12/21/2021    ALT 9 03/18/2022    ALT 36 01/15/2022    ALT 17 12/21/2021       All of the past medical history, past surgical history, family history,social history, allergies and current medications were reviewed with the patient. Assessment & Plan   Diagnoses and all orders for this visit:     Diagnosis Orders   1. Posterior glottic stenosis     2. Tracheostomy dependence ([facility] [address]     3.  Hoarseness            This patient's history and these physical findings, it is fortunate that she survived her inadvertent decannulation and I believe her  recognizes how much information was lacking regarding the care of her airway compared to what he knows now. He reports generally understanding how to get the cannula back in to its position should it come out and recognizes the value of being able to change out the entire cannula as long as he is prepared to replace it quickly without much delay. The patient is also much more at ease about this process. I will get cannulas ordered for him that are of the new version of the Shiley tracheostomy system that have disposable inner cannulas if need be. I will also get the patient set up for next stage of treatment which will include probably removing the polypoid protrusion of the left true vocal fold and possibly lateralizing the left arytenoid on a temporary basis until the patient's airway can be brought to heal well enough with an enlarged aperture. I reviewed with her and with her  the indications benefits limitations and risks of proceeding in this manner to their satisfaction. They reported understanding the basis of my recommendations and being willing to proceed as recommended with the hopes of eventual decannulation. I spent over an hour of face-to-face time with the patient and  more than half of which was dedicated to reviewing her complex history and designing this treatment program.        Return in about 2 months (around 7/23/2022) for Postop evaluation. **This report has been created using voice recognition software. It may contain minor errors which are inherent in voice recognition technology. **

## 2022-06-01 ENCOUNTER — TELEPHONE (OUTPATIENT)
Dept: ENT CLINIC | Age: 72
End: 2022-06-01

## 2022-06-01 DIAGNOSIS — J95.03 TRACHEAL STENOSIS DUE TO TRACHEOSTOMY (HCC): ICD-10-CM

## 2022-06-01 DIAGNOSIS — J95.09 OTHER TRACHEOSTOMY COMPLICATION (HCC): ICD-10-CM

## 2022-06-01 DIAGNOSIS — R06.00 DYSPNEA AND RESPIRATORY ABNORMALITIES: ICD-10-CM

## 2022-06-01 DIAGNOSIS — J38.6 POSTERIOR GLOTTIC STENOSIS: Primary | ICD-10-CM

## 2022-06-01 DIAGNOSIS — R06.89 DYSPNEA AND RESPIRATORY ABNORMALITIES: ICD-10-CM

## 2022-06-01 DIAGNOSIS — Z93.0 TRACHEOSTOMY DEPENDENCE (HCC): ICD-10-CM

## 2022-06-01 DIAGNOSIS — R49.0 HOARSENESS: ICD-10-CM

## 2022-06-01 NOTE — TELEPHONE ENCOUNTER
No castellated trach outer and inner tube with no whole. Anup number 6 the new style with the canula. Patients  said Dr. Jordan Lozano was going to order two one to use and one for spare. Also said talked about surgery to remove scar tissue and that someone was going to call but they have not heard anything yet.  was wondering if it was ordered yet because they have not gotten anything.

## 2022-06-02 NOTE — TELEPHONE ENCOUNTER
Dr Montrell Drew, can you provide some recommendations? Your note mentions the patient's  ordered #6 fenestrated cannula previously. You assessment and plan mentions wanting to get the patient shiley tracheostomy system that have disposable inner cannulas. According to Dr Machado's note from 4/27/22 she had a shiley 6 replaced in the hospital after she had a #4 placed that she felt was too small and limiting her breathing. You also mentioned scheduling for surgery- \" I will also get the patient set up for next stage of treatment which will include probably removing the polypoid protrusion of the left true vocal fold and possibly lateralizing the left arytenoid on a temporary basis until the patient's airway can be brought to heal well enough with an enlarged aperture. \"

## 2022-06-06 ENCOUNTER — HOSPITAL ENCOUNTER (OUTPATIENT)
Age: 72
Setting detail: OBSERVATION
Discharge: HOME | End: 2022-06-08
Attending: STUDENT IN AN ORGANIZED HEALTH CARE EDUCATION/TRAINING PROGRAM | Admitting: INTERNAL MEDICINE
Payer: MEDICARE

## 2022-06-06 ENCOUNTER — APPOINTMENT (OUTPATIENT)
Dept: GENERAL RADIOLOGY | Age: 72
End: 2022-06-06
Payer: MEDICARE

## 2022-06-06 ENCOUNTER — APPOINTMENT (OUTPATIENT)
Dept: CT IMAGING | Age: 72
End: 2022-06-06
Payer: MEDICARE

## 2022-06-06 DIAGNOSIS — R06.89 DYSPNEA AND RESPIRATORY ABNORMALITIES: ICD-10-CM

## 2022-06-06 DIAGNOSIS — R07.9 ACUTE CHEST PAIN: Primary | ICD-10-CM

## 2022-06-06 DIAGNOSIS — R06.00 DYSPNEA AND RESPIRATORY ABNORMALITIES: ICD-10-CM

## 2022-06-06 PROBLEM — I20.0 UNSTABLE ANGINA (HCC): Status: ACTIVE | Noted: 2022-06-06

## 2022-06-06 LAB
ALBUMIN SERPL-MCNC: 3.9 G/DL (ref 3.5–5.1)
ALP BLD-CCNC: 66 U/L (ref 38–126)
ALT SERPL-CCNC: 15 U/L (ref 11–66)
ANION GAP SERPL CALCULATED.3IONS-SCNC: 10 MEQ/L (ref 8–16)
AST SERPL-CCNC: 17 U/L (ref 5–40)
BASOPHILS # BLD: 0.3 %
BASOPHILS ABSOLUTE: 0 THOU/MM3 (ref 0–0.1)
BILIRUB SERPL-MCNC: 0.2 MG/DL (ref 0.3–1.2)
BUN BLDV-MCNC: 19 MG/DL (ref 7–22)
CALCIUM SERPL-MCNC: 8.6 MG/DL (ref 8.5–10.5)
CHLORIDE BLD-SCNC: 103 MEQ/L (ref 98–111)
CO2: 26 MEQ/L (ref 23–33)
CREAT SERPL-MCNC: 0.7 MG/DL (ref 0.4–1.2)
D-DIMER QUANTITATIVE: 962 NG/ML FEU (ref 0–500)
EOSINOPHIL # BLD: 0.1 %
EOSINOPHILS ABSOLUTE: 0 THOU/MM3 (ref 0–0.4)
ERYTHROCYTE [DISTWIDTH] IN BLOOD BY AUTOMATED COUNT: 15.6 % (ref 11.5–14.5)
ERYTHROCYTE [DISTWIDTH] IN BLOOD BY AUTOMATED COUNT: 51.9 FL (ref 35–45)
GFR SERPL CREATININE-BSD FRML MDRD: 83 ML/MIN/1.73M2
GLUCOSE BLD-MCNC: 214 MG/DL
GLUCOSE BLD-MCNC: 214 MG/DL (ref 70–108)
GLUCOSE BLD-MCNC: 295 MG/DL (ref 70–108)
HCT VFR BLD CALC: 30.9 % (ref 37–47)
HEMOGLOBIN: 9.9 GM/DL (ref 12–16)
IMMATURE GRANS (ABS): 0.17 THOU/MM3 (ref 0–0.07)
IMMATURE GRANULOCYTES: 1.8 %
LYMPHOCYTES # BLD: 9.7 %
LYMPHOCYTES ABSOLUTE: 0.9 THOU/MM3 (ref 1–4.8)
MCH RBC QN AUTO: 29.5 PG (ref 26–33)
MCHC RBC AUTO-ENTMCNC: 32 GM/DL (ref 32.2–35.5)
MCV RBC AUTO: 92 FL (ref 81–99)
MONOCYTES # BLD: 4.6 %
MONOCYTES ABSOLUTE: 0.4 THOU/MM3 (ref 0.4–1.3)
NUCLEATED RED BLOOD CELLS: 0 /100 WBC
OSMOLALITY CALCULATION: 290.7 MOSMOL/KG (ref 275–300)
PLATELET # BLD: 208 THOU/MM3 (ref 130–400)
PMV BLD AUTO: 10.5 FL (ref 9.4–12.4)
POTASSIUM REFLEX MAGNESIUM: 3.8 MEQ/L (ref 3.5–5.2)
PRO-BNP: 156.7 PG/ML (ref 0–900)
RBC # BLD: 3.36 MILL/MM3 (ref 4.2–5.4)
SEG NEUTROPHILS: 83.5 %
SEGMENTED NEUTROPHILS ABSOLUTE COUNT: 8 THOU/MM3 (ref 1.8–7.7)
SODIUM BLD-SCNC: 139 MEQ/L (ref 135–145)
TOTAL PROTEIN: 6.1 G/DL (ref 6.1–8)
TROPONIN T: < 0.01 NG/ML
TROPONIN T: < 0.01 NG/ML
WBC # BLD: 9.6 THOU/MM3 (ref 4.8–10.8)

## 2022-06-06 PROCEDURE — 6360000004 HC RX CONTRAST MEDICATION: Performed by: STUDENT IN AN ORGANIZED HEALTH CARE EDUCATION/TRAINING PROGRAM

## 2022-06-06 PROCEDURE — 36415 COLL VENOUS BLD VENIPUNCTURE: CPT

## 2022-06-06 PROCEDURE — 71275 CT ANGIOGRAPHY CHEST: CPT

## 2022-06-06 PROCEDURE — 84484 ASSAY OF TROPONIN QUANT: CPT

## 2022-06-06 PROCEDURE — 80053 COMPREHEN METABOLIC PANEL: CPT

## 2022-06-06 PROCEDURE — 82948 REAGENT STRIP/BLOOD GLUCOSE: CPT

## 2022-06-06 PROCEDURE — G0378 HOSPITAL OBSERVATION PER HR: HCPCS

## 2022-06-06 PROCEDURE — 99285 EMERGENCY DEPT VISIT HI MDM: CPT

## 2022-06-06 PROCEDURE — 85025 COMPLETE CBC W/AUTO DIFF WBC: CPT

## 2022-06-06 PROCEDURE — 99220 PR INITIAL OBSERVATION CARE/DAY 70 MINUTES: CPT | Performed by: INTERNAL MEDICINE

## 2022-06-06 PROCEDURE — 85379 FIBRIN DEGRADATION QUANT: CPT

## 2022-06-06 PROCEDURE — 83880 ASSAY OF NATRIURETIC PEPTIDE: CPT

## 2022-06-06 PROCEDURE — 71045 X-RAY EXAM CHEST 1 VIEW: CPT

## 2022-06-06 PROCEDURE — 93005 ELECTROCARDIOGRAM TRACING: CPT | Performed by: NURSE PRACTITIONER

## 2022-06-06 RX ORDER — SODIUM CHLORIDE 0.9 % (FLUSH) 0.9 %
10 SYRINGE (ML) INJECTION EVERY 12 HOURS SCHEDULED
Status: DISCONTINUED | OUTPATIENT
Start: 2022-06-06 | End: 2022-06-08 | Stop reason: HOSPADM

## 2022-06-06 RX ORDER — HYDROXYZINE PAMOATE 25 MG/1
25 CAPSULE ORAL 4 TIMES DAILY PRN
Status: DISCONTINUED | OUTPATIENT
Start: 2022-06-06 | End: 2022-06-08 | Stop reason: HOSPADM

## 2022-06-06 RX ORDER — INSULIN LISPRO 100 [IU]/ML
0-6 INJECTION, SOLUTION INTRAVENOUS; SUBCUTANEOUS NIGHTLY
Status: DISCONTINUED | OUTPATIENT
Start: 2022-06-06 | End: 2022-06-08 | Stop reason: HOSPADM

## 2022-06-06 RX ORDER — DOCUSATE SODIUM 100 MG/1
100 CAPSULE, LIQUID FILLED ORAL 2 TIMES DAILY
Status: DISCONTINUED | OUTPATIENT
Start: 2022-06-06 | End: 2022-06-08 | Stop reason: HOSPADM

## 2022-06-06 RX ORDER — INSULIN LISPRO 100 [IU]/ML
0-12 INJECTION, SOLUTION INTRAVENOUS; SUBCUTANEOUS
Status: DISCONTINUED | OUTPATIENT
Start: 2022-06-07 | End: 2022-06-08 | Stop reason: HOSPADM

## 2022-06-06 RX ORDER — ALBUTEROL SULFATE 90 UG/1
2 AEROSOL, METERED RESPIRATORY (INHALATION) EVERY 6 HOURS PRN
Status: DISCONTINUED | OUTPATIENT
Start: 2022-06-06 | End: 2022-06-08 | Stop reason: HOSPADM

## 2022-06-06 RX ORDER — FAMOTIDINE 20 MG/1
20 TABLET, FILM COATED ORAL 2 TIMES DAILY
Status: DISCONTINUED | OUTPATIENT
Start: 2022-06-06 | End: 2022-06-08 | Stop reason: HOSPADM

## 2022-06-06 RX ORDER — ENOXAPARIN SODIUM 100 MG/ML
40 INJECTION SUBCUTANEOUS DAILY
Status: DISCONTINUED | OUTPATIENT
Start: 2022-06-07 | End: 2022-06-08 | Stop reason: HOSPADM

## 2022-06-06 RX ORDER — SODIUM CHLORIDE 0.9 % (FLUSH) 0.9 %
10 SYRINGE (ML) INJECTION PRN
Status: DISCONTINUED | OUTPATIENT
Start: 2022-06-06 | End: 2022-06-08 | Stop reason: HOSPADM

## 2022-06-06 RX ORDER — FUROSEMIDE 40 MG/1
40 TABLET ORAL DAILY
Status: DISCONTINUED | OUTPATIENT
Start: 2022-06-07 | End: 2022-06-08 | Stop reason: HOSPADM

## 2022-06-06 RX ORDER — ASPIRIN 81 MG/1
81 TABLET, CHEWABLE ORAL DAILY
Status: DISCONTINUED | OUTPATIENT
Start: 2022-06-07 | End: 2022-06-08 | Stop reason: HOSPADM

## 2022-06-06 RX ORDER — ROSUVASTATIN CALCIUM 10 MG/1
10 TABLET, COATED ORAL DAILY
Status: DISCONTINUED | OUTPATIENT
Start: 2022-06-07 | End: 2022-06-08 | Stop reason: HOSPADM

## 2022-06-06 RX ORDER — SODIUM CHLORIDE 9 MG/ML
INJECTION, SOLUTION INTRAVENOUS PRN
Status: DISCONTINUED | OUTPATIENT
Start: 2022-06-06 | End: 2022-06-08 | Stop reason: HOSPADM

## 2022-06-06 RX ORDER — ACETAMINOPHEN 325 MG/1
650 TABLET ORAL EVERY 6 HOURS PRN
Status: DISCONTINUED | OUTPATIENT
Start: 2022-06-06 | End: 2022-06-08 | Stop reason: HOSPADM

## 2022-06-06 RX ORDER — NITROGLYCERIN 0.4 MG/1
0.4 TABLET SUBLINGUAL EVERY 5 MIN PRN
Status: DISCONTINUED | OUTPATIENT
Start: 2022-06-06 | End: 2022-06-08 | Stop reason: HOSPADM

## 2022-06-06 RX ORDER — POLYETHYLENE GLYCOL 3350 17 G/17G
17 POWDER, FOR SOLUTION ORAL DAILY PRN
Status: DISCONTINUED | OUTPATIENT
Start: 2022-06-06 | End: 2022-06-08 | Stop reason: HOSPADM

## 2022-06-06 RX ORDER — ONDANSETRON 4 MG/1
4 TABLET, ORALLY DISINTEGRATING ORAL EVERY 8 HOURS PRN
Status: DISCONTINUED | OUTPATIENT
Start: 2022-06-06 | End: 2022-06-08 | Stop reason: HOSPADM

## 2022-06-06 RX ORDER — METOPROLOL SUCCINATE 25 MG/1
25 TABLET, EXTENDED RELEASE ORAL DAILY
Status: DISCONTINUED | OUTPATIENT
Start: 2022-06-07 | End: 2022-06-08 | Stop reason: HOSPADM

## 2022-06-06 RX ORDER — PREDNISONE 10 MG/1
10 TABLET ORAL DAILY
Status: DISCONTINUED | OUTPATIENT
Start: 2022-06-07 | End: 2022-06-08 | Stop reason: HOSPADM

## 2022-06-06 RX ORDER — BUDESONIDE 0.5 MG/2ML
0.5 INHALANT ORAL 2 TIMES DAILY
Status: DISCONTINUED | OUTPATIENT
Start: 2022-06-06 | End: 2022-06-08 | Stop reason: HOSPADM

## 2022-06-06 RX ORDER — LANOLIN ALCOHOL/MO/W.PET/CERES
6 CREAM (GRAM) TOPICAL NIGHTLY
Status: DISCONTINUED | OUTPATIENT
Start: 2022-06-06 | End: 2022-06-08 | Stop reason: HOSPADM

## 2022-06-06 RX ORDER — ACETAMINOPHEN 650 MG/1
650 SUPPOSITORY RECTAL EVERY 6 HOURS PRN
Status: DISCONTINUED | OUTPATIENT
Start: 2022-06-06 | End: 2022-06-08 | Stop reason: HOSPADM

## 2022-06-06 RX ORDER — INSULIN GLARGINE 100 [IU]/ML
10 INJECTION, SOLUTION SUBCUTANEOUS 2 TIMES DAILY
Status: DISCONTINUED | OUTPATIENT
Start: 2022-06-07 | End: 2022-06-08 | Stop reason: HOSPADM

## 2022-06-06 RX ORDER — ONDANSETRON 2 MG/ML
4 INJECTION INTRAMUSCULAR; INTRAVENOUS EVERY 6 HOURS PRN
Status: DISCONTINUED | OUTPATIENT
Start: 2022-06-06 | End: 2022-06-08 | Stop reason: HOSPADM

## 2022-06-06 RX ADMIN — IOPAMIDOL 80 ML: 755 INJECTION, SOLUTION INTRAVENOUS at 19:35

## 2022-06-06 ASSESSMENT — PAIN - FUNCTIONAL ASSESSMENT: PAIN_FUNCTIONAL_ASSESSMENT: NONE - DENIES PAIN

## 2022-06-06 ASSESSMENT — PAIN SCALES - WONG BAKER: WONGBAKER_NUMERICALRESPONSE: 0

## 2022-06-06 ASSESSMENT — PAIN DESCRIPTION - LOCATION: LOCATION: FOOT

## 2022-06-06 ASSESSMENT — PAIN SCALES - GENERAL: PAINLEVEL_OUTOF10: 8

## 2022-06-06 NOTE — TELEPHONE ENCOUNTER
[facility]  called stating that she is probably going to the ED. She is having a hard time breathing, like she's wearing a corset. Lawrence Hawkins took her pulse and oxygen both are 98. She also has some diarrhea. She wanted to know if when she gets to the ED [facility] can see her, I let her know Dr Rohith French is on call.

## 2022-06-06 NOTE — ED NOTES
In case of an emergency with patient airway, patient and wife confirmed trach is a shiley uncuffed size 6. They have extra supplies with them due to previous concerns of supply. Patient inner cannula changed last night. Dressing clean and intact.            West Bingham RN  06/06/22 [id number]

## 2022-06-06 NOTE — ED NOTES
Patient appears anxious and also states \"I get anxious when I come here. I took one of my anxiety medications before coming here\".  to bedside.       Nieves Bingham RN  06/06/22 [id number]

## 2022-06-06 NOTE — TELEPHONE ENCOUNTER
Patients  called back wanting to know what was going on and when they would know more oris the trach stuff has been order yet.

## 2022-06-06 NOTE — ED PROVIDER NOTES
[address] [facility] MEDICINE ATTENDING ATTESTATION      Evaluation of Meghana Acevedo. Case discussed and care plan developed with resident physician. I agree with the resident physician documentation and plan as documented by him, except if my documentation differs. Patient seen, interviewed and examined by me. I reviewed the medical, surgical, family and social history, medications and allergies. I have reviewed the nursing documentation. I have reviewed the patient's vital signs and are normal per my interpretation. Body mass index is 35.3 kg/m². Pulsoxymetry is normal per my interpretation. Brief H&P   Patient c/o chest pain, 8 pound weight gain in the last week, leg swelling, recent travel, some blood from trach    Physical exam is notable for chronically ill-appearing, tracheostomy in place, breath sounds diminished bilaterally, pitting edema in lower extremities, extremities warm well perfused      Medical Decision Making   MDM:   Patient is a 68-year-old female with a history of CAD, type 2 diabetes, cardiomyopathy, chronic respiratory failure and tracheal stenosis s/p tracheostomy who presents with chest pain, weight gain and lower extremity edema. Patient hemodynamically stable and in no distress. EKG unchanged from previous, laboratory work-up negative including negative troponin x2. CTA negative for PE. Given ongoing chest pain and heart score of 5-6, patient is high risk for cardiac etiology so will admit for cardiac monitoring. Plan:    Admit to hospitalist    Please see the resident physician completed note for final disposition except as documented on this attestation. I have reviewed and interpreted all available lab, radiology and ekg results available at the moment. Diagnosis, treatment and disposition plans were discussed and agreed upon by patient. This transcription was electronically signed.  It was dictated by use of voice recognition software and electronically transcribed. The transcription may contain errors not detected in proofreading.      I performed direct supervision and was present for the critical portion following procedures: None  Critical care time on this case: None    Electronically signed by Ania Marin MD on 6/6/22 at 6:38 PM EDT        Ania Marin MD  06/07/22 [id number]

## 2022-06-06 NOTE — TELEPHONE ENCOUNTER
Dr. Nacho Garcia was in surgery last week and likely did not have a chance to check his in basket due to the numerous surgeries he had. He is unfortunately out of the office this week. I will check in with him again once he returns early next week and see if we can make progress with planning.

## 2022-06-07 ENCOUNTER — APPOINTMENT (OUTPATIENT)
Dept: INTERVENTIONAL RADIOLOGY/VASCULAR | Age: 72
End: 2022-06-07
Payer: MEDICARE

## 2022-06-07 ENCOUNTER — APPOINTMENT (OUTPATIENT)
Dept: NON INVASIVE DIAGNOSTICS | Age: 72
End: 2022-06-07
Payer: MEDICARE

## 2022-06-07 PROBLEM — J96.11 CHRONIC RESPIRATORY FAILURE WITH HYPOXIA (HCC): Status: ACTIVE | Noted: 2022-06-07

## 2022-06-07 PROBLEM — E11.65 TYPE 2 DIABETES MELLITUS WITH HYPERGLYCEMIA, WITH LONG-TERM CURRENT USE OF INSULIN (HCC): Status: ACTIVE | Noted: 2022-01-14

## 2022-06-07 PROBLEM — Z79.4 TYPE 2 DIABETES MELLITUS WITH HYPERGLYCEMIA, WITH LONG-TERM CURRENT USE OF INSULIN (HCC): Status: ACTIVE | Noted: 2022-01-14

## 2022-06-07 LAB
ANION GAP SERPL CALCULATED.3IONS-SCNC: 14 MEQ/L (ref 8–16)
AVERAGE GLUCOSE: 198 MG/DL (ref 70–126)
BUN BLDV-MCNC: 15 MG/DL (ref 7–22)
CALCIUM SERPL-MCNC: 9.1 MG/DL (ref 8.5–10.5)
CHLORIDE BLD-SCNC: 105 MEQ/L (ref 98–111)
CO2: 22 MEQ/L (ref 23–33)
CREAT SERPL-MCNC: 0.6 MG/DL (ref 0.4–1.2)
EKG ATRIAL RATE: 84 BPM
EKG P AXIS: 36 DEGREES
EKG P-R INTERVAL: 136 MS
EKG Q-T INTERVAL: 418 MS
EKG QRS DURATION: 80 MS
EKG QTC CALCULATION (BAZETT): 493 MS
EKG R AXIS: 35 DEGREES
EKG T AXIS: 46 DEGREES
EKG VENTRICULAR RATE: 84 BPM
ERYTHROCYTE [DISTWIDTH] IN BLOOD BY AUTOMATED COUNT: 15.7 % (ref 11.5–14.5)
ERYTHROCYTE [DISTWIDTH] IN BLOOD BY AUTOMATED COUNT: 53.8 FL (ref 35–45)
GFR SERPL CREATININE-BSD FRML MDRD: > 90 ML/MIN/1.73M2
GLUCOSE BLD-MCNC: 104 MG/DL (ref 70–108)
GLUCOSE BLD-MCNC: 159 MG/DL (ref 70–108)
GLUCOSE BLD-MCNC: 186 MG/DL (ref 70–108)
GLUCOSE BLD-MCNC: 302 MG/DL (ref 70–108)
GLUCOSE BLD-MCNC: 414 MG/DL (ref 70–108)
GLUCOSE BLD-MCNC: 91 MG/DL (ref 70–108)
HBA1C MFR BLD: 8.6 % (ref 4.4–6.4)
HCT VFR BLD CALC: 33.5 % (ref 37–47)
HEMOGLOBIN: 10.6 GM/DL (ref 12–16)
LACTIC ACID: 0.9 MMOL/L (ref 0.5–2)
MCH RBC QN AUTO: 29.8 PG (ref 26–33)
MCHC RBC AUTO-ENTMCNC: 31.6 GM/DL (ref 32.2–35.5)
MCV RBC AUTO: 94.1 FL (ref 81–99)
PLATELET # BLD: 222 THOU/MM3 (ref 130–400)
PMV BLD AUTO: 10.6 FL (ref 9.4–12.4)
POTASSIUM SERPL-SCNC: 3.1 MEQ/L (ref 3.5–5.2)
RBC # BLD: 3.56 MILL/MM3 (ref 4.2–5.4)
SODIUM BLD-SCNC: 141 MEQ/L (ref 135–145)
TROPONIN T: < 0.01 NG/ML
TSH SERPL DL<=0.05 MIU/L-ACNC: 3.1 UIU/ML (ref 0.4–4.2)
WBC # BLD: 9.5 THOU/MM3 (ref 4.8–10.8)

## 2022-06-07 PROCEDURE — 93016 CV STRESS TEST SUPVJ ONLY: CPT | Performed by: INTERNAL MEDICINE

## 2022-06-07 PROCEDURE — 96374 THER/PROPH/DIAG INJ IV PUSH: CPT | Performed by: INTERNAL MEDICINE

## 2022-06-07 PROCEDURE — 83036 HEMOGLOBIN GLYCOSYLATED A1C: CPT

## 2022-06-07 PROCEDURE — 2700000000 HC OXYGEN THERAPY PER DAY

## 2022-06-07 PROCEDURE — 3430000000 HC RX DIAGNOSTIC RADIOPHARMACEUTICAL: Performed by: HOSPITALIST

## 2022-06-07 PROCEDURE — 2500000003 HC RX 250 WO HCPCS: Performed by: INTERNAL MEDICINE

## 2022-06-07 PROCEDURE — 94760 N-INVAS EAR/PLS OXIMETRY 1: CPT

## 2022-06-07 PROCEDURE — G0378 HOSPITAL OBSERVATION PER HR: HCPCS

## 2022-06-07 PROCEDURE — 6370000000 HC RX 637 (ALT 250 FOR IP): Performed by: INTERNAL MEDICINE

## 2022-06-07 PROCEDURE — 93010 ELECTROCARDIOGRAM REPORT: CPT | Performed by: INTERNAL MEDICINE

## 2022-06-07 PROCEDURE — 84443 ASSAY THYROID STIM HORMONE: CPT

## 2022-06-07 PROCEDURE — 84484 ASSAY OF TROPONIN QUANT: CPT

## 2022-06-07 PROCEDURE — 83605 ASSAY OF LACTIC ACID: CPT

## 2022-06-07 PROCEDURE — 85027 COMPLETE CBC AUTOMATED: CPT

## 2022-06-07 PROCEDURE — 99226 PR SBSQ OBSERVATION CARE/DAY 35 MINUTES: CPT | Performed by: HOSPITALIST

## 2022-06-07 PROCEDURE — 78452 HT MUSCLE IMAGE SPECT MULT: CPT

## 2022-06-07 PROCEDURE — 36415 COLL VENOUS BLD VENIPUNCTURE: CPT

## 2022-06-07 PROCEDURE — 6360000002 HC RX W HCPCS

## 2022-06-07 PROCEDURE — 94640 AIRWAY INHALATION TREATMENT: CPT

## 2022-06-07 PROCEDURE — 6370000000 HC RX 637 (ALT 250 FOR IP): Performed by: STUDENT IN AN ORGANIZED HEALTH CARE EDUCATION/TRAINING PROGRAM

## 2022-06-07 PROCEDURE — 96372 THER/PROPH/DIAG INJ SC/IM: CPT

## 2022-06-07 PROCEDURE — A9500 TC99M SESTAMIBI: HCPCS | Performed by: HOSPITALIST

## 2022-06-07 PROCEDURE — 78452 HT MUSCLE IMAGE SPECT MULT: CPT | Performed by: INTERNAL MEDICINE

## 2022-06-07 PROCEDURE — 80048 BASIC METABOLIC PNL TOTAL CA: CPT

## 2022-06-07 PROCEDURE — 93018 CV STRESS TEST I&R ONLY: CPT | Performed by: INTERNAL MEDICINE

## 2022-06-07 PROCEDURE — 6360000002 HC RX W HCPCS: Performed by: INTERNAL MEDICINE

## 2022-06-07 PROCEDURE — 82948 REAGENT STRIP/BLOOD GLUCOSE: CPT

## 2022-06-07 PROCEDURE — 2580000003 HC RX 258: Performed by: INTERNAL MEDICINE

## 2022-06-07 PROCEDURE — 99215 OFFICE O/P EST HI 40 MIN: CPT | Performed by: INTERNAL MEDICINE

## 2022-06-07 PROCEDURE — 93970 EXTREMITY STUDY: CPT

## 2022-06-07 PROCEDURE — 93017 CV STRESS TEST TRACING ONLY: CPT | Performed by: INTERNAL MEDICINE

## 2022-06-07 RX ORDER — GUAIFENESIN 400 MG/1
400 TABLET ORAL 2 TIMES DAILY PRN
Status: DISCONTINUED | OUTPATIENT
Start: 2022-06-07 | End: 2022-06-07 | Stop reason: DRUGHIGH

## 2022-06-07 RX ORDER — LISINOPRIL 10 MG/1
10 TABLET ORAL DAILY
Status: DISCONTINUED | OUTPATIENT
Start: 2022-06-07 | End: 2022-06-08 | Stop reason: HOSPADM

## 2022-06-07 RX ORDER — LISINOPRIL 10 MG/1
30 TABLET ORAL DAILY
Status: ON HOLD | COMMUNITY
End: 2022-06-08 | Stop reason: HOSPADM

## 2022-06-07 RX ORDER — GUAIFENESIN 600 MG/1
600 TABLET, EXTENDED RELEASE ORAL EVERY 12 HOURS PRN
Status: DISCONTINUED | OUTPATIENT
Start: 2022-06-07 | End: 2022-06-08 | Stop reason: HOSPADM

## 2022-06-07 RX ORDER — DEXTROSE MONOHYDRATE 50 MG/ML
100 INJECTION, SOLUTION INTRAVENOUS PRN
Status: DISCONTINUED | OUTPATIENT
Start: 2022-06-07 | End: 2022-06-08 | Stop reason: HOSPADM

## 2022-06-07 RX ORDER — POTASSIUM CHLORIDE 20 MEQ/1
40 TABLET, EXTENDED RELEASE ORAL ONCE
Status: COMPLETED | OUTPATIENT
Start: 2022-06-07 | End: 2022-06-07

## 2022-06-07 RX ORDER — BUSPIRONE HYDROCHLORIDE 10 MG/1
10 TABLET ORAL 3 TIMES DAILY
Status: DISCONTINUED | OUTPATIENT
Start: 2022-06-07 | End: 2022-06-08 | Stop reason: HOSPADM

## 2022-06-07 RX ORDER — GUAIFENESIN 400 MG/1
400 TABLET ORAL 2 TIMES DAILY PRN
COMMUNITY

## 2022-06-07 RX ADMIN — SODIUM CHLORIDE, PRESERVATIVE FREE 10 ML: 5 INJECTION INTRAVENOUS at 08:24

## 2022-06-07 RX ADMIN — FAMOTIDINE 20 MG: 20 TABLET ORAL at 00:59

## 2022-06-07 RX ADMIN — INSULIN LISPRO 4 UNITS: 100 INJECTION, SOLUTION INTRAVENOUS; SUBCUTANEOUS at 00:55

## 2022-06-07 RX ADMIN — Medication 34.8 MILLICURIE: at 14:00

## 2022-06-07 RX ADMIN — BUDESONIDE 500 MCG: 0.5 INHALANT RESPIRATORY (INHALATION) at 08:56

## 2022-06-07 RX ADMIN — PREDNISONE 10 MG: 10 TABLET ORAL at 08:26

## 2022-06-07 RX ADMIN — SODIUM CHLORIDE, PRESERVATIVE FREE 10 ML: 5 INJECTION INTRAVENOUS at 00:59

## 2022-06-07 RX ADMIN — INSULIN GLARGINE 10 UNITS: 100 INJECTION, SOLUTION SUBCUTANEOUS at 03:35

## 2022-06-07 RX ADMIN — Medication 11 MILLICURIE: at 12:55

## 2022-06-07 RX ADMIN — FAMOTIDINE 20 MG: 20 TABLET ORAL at 21:00

## 2022-06-07 RX ADMIN — FUROSEMIDE 40 MG: 40 TABLET ORAL at 08:26

## 2022-06-07 RX ADMIN — METOPROLOL SUCCINATE 25 MG: 25 TABLET, EXTENDED RELEASE ORAL at 08:27

## 2022-06-07 RX ADMIN — MICONAZOLE NITRATE: 2 POWDER TOPICAL at 08:25

## 2022-06-07 RX ADMIN — SODIUM CHLORIDE, PRESERVATIVE FREE 10 ML: 5 INJECTION INTRAVENOUS at 21:03

## 2022-06-07 RX ADMIN — POTASSIUM CHLORIDE 40 MEQ: 1500 TABLET, EXTENDED RELEASE ORAL at 18:06

## 2022-06-07 RX ADMIN — BUDESONIDE 500 MCG: 0.5 INHALANT RESPIRATORY (INHALATION) at 18:24

## 2022-06-07 RX ADMIN — ASPIRIN 81 MG 81 MG: 81 TABLET ORAL at 08:29

## 2022-06-07 RX ADMIN — MICONAZOLE NITRATE: 2 POWDER TOPICAL at 21:01

## 2022-06-07 RX ADMIN — BUSPIRONE HYDROCHLORIDE 10 MG: 10 TABLET ORAL at 23:30

## 2022-06-07 RX ADMIN — INSULIN LISPRO 12 UNITS: 100 INJECTION, SOLUTION INTRAVENOUS; SUBCUTANEOUS at 16:31

## 2022-06-07 RX ADMIN — FAMOTIDINE 20 MG: 20 TABLET ORAL at 08:30

## 2022-06-07 RX ADMIN — BUSPIRONE HYDROCHLORIDE 10 MG: 10 TABLET ORAL at 18:06

## 2022-06-07 RX ADMIN — INSULIN GLARGINE 10 UNITS: 100 INJECTION, SOLUTION SUBCUTANEOUS at 21:01

## 2022-06-07 RX ADMIN — LISINOPRIL 10 MG: 10 TABLET ORAL at 18:06

## 2022-06-07 RX ADMIN — ROSUVASTATIN CALCIUM 10 MG: 10 TABLET, FILM COATED ORAL at 21:00

## 2022-06-07 RX ADMIN — Medication 6 MG: at 21:00

## 2022-06-07 RX ADMIN — MICONAZOLE NITRATE: 2 POWDER TOPICAL at 00:59

## 2022-06-07 RX ADMIN — Medication 6 MG: at 00:59

## 2022-06-07 RX ADMIN — INSULIN LISPRO 1 UNITS: 100 INJECTION, SOLUTION INTRAVENOUS; SUBCUTANEOUS at 21:00

## 2022-06-07 ASSESSMENT — ENCOUNTER SYMPTOMS
GASTROINTESTINAL NEGATIVE: 1
COLOR CHANGE: 0
EYES NEGATIVE: 1
RHINORRHEA: 0
CONSTIPATION: 0
VOMITING: 0
SHORTNESS OF BREATH: 1
COUGH: 0
WHEEZING: 0
ABDOMINAL DISTENTION: 0
EYE PAIN: 0
EYE DISCHARGE: 0
CHEST TIGHTNESS: 1
SORE THROAT: 0
DIARRHEA: 0
NAUSEA: 0

## 2022-06-07 ASSESSMENT — LIFESTYLE VARIABLES: HOW OFTEN DO YOU HAVE A DRINK CONTAINING ALCOHOL: NEVER

## 2022-06-07 NOTE — FLOWSHEET NOTE
Notified MICHAEL Oropeza about cardiology consult.     Notified Dr. Brendon Deras about Otolaryngology consult

## 2022-06-07 NOTE — CARE COORDINATION
6/7/22, 8:03 AM EDT  DISCHARGE PLANNING EVALUATION:    Neftali Okeefe       Admitted: 6/6/2022/ [facility] day: 0   Location: -12/012-A Reason for admit: Unstable angina ([facility] [address] [I20.0]  Dyspnea and respiratory abnormalities [R06.00, R06.89]  Acute chest pain [R07.9]   PMH:  has a past medical history of Arthritis, Coronary artery disease, COVID, Hyperlipidemia, Primary hypertension, and Type 2 diabetes mellitus ([facility] [address] Barriers to Discharge:  Trops neg. Ddimer 962. Doppler studies ordered. Cardio and ENT consults pending. PCP: Hayder Serrano   %  Patient's Healthcare Decision Maker: Legal Next of Kin    Patient Goals/Plan/Treatment Preferences: Met with Davis Jefferson, she plans to return home with her  at discharge. She uses 2L oxygen via trach mask (with moisture) provided by [employer]. She had [facility] in the past but does not feel services are needed at this time. She has PCP and insurance. Transportation/Food Security/Housekeeping Addressed:  No issues identified.

## 2022-06-07 NOTE — CONSULTS
[facility]  Cardiology Consult        Patient:  Karla Syed  YOB: 1950  MRN: [id number]     Acct: [de-identified]    PCP: Julio C Reyes    Date of Admission: 2022      Reason for Consultation:  Chest pain      History Of Present Illness:    73 y.o. pleasant female c hx of CAD s/p PCI, COVID-19 infection in 2021 status post tracheostomy dependent respiratory failure, diabetes, hypertension, hyperlipidemia who presented to the hospital with complaints of worsening shortness of breath and chest pain. As per patient she felt the chest pain was left-sided, tightening over the entire precordium, sudden onset, no alleviating or aggravating factors. She previously has seen a cardiologist in [place] and had a stress test about 5 years ago which was apparently normal.  She also states that she has a scheduled ENT procedure where the scar tissue will be removed from the trachea and she has been nervous about that. Cardiology was consulted for evaluation of chest pain. As per patient this chest pain has happened in the past and it is intermittent. Electrocardiogram shows normal sinus rhythm with no ST-T wave changes. Serial enzymes were negative ruling out ACS. All labs, EKG's, diagnostic testing and images as well as cardiac cath, stress testing were reviewed during this encounter.     Past Medical History:          Diagnosis Date    Arthritis     Coronary artery disease     COVID     Hyperlipidemia     Primary hypertension     Type 2 diabetes mellitus ([address]        Past Surgical History:          Procedure Laterality Date    CARDIAC SURGERY      CATARACT REMOVAL Bilateral      SECTION      3 times    CORONARY ANGIOPLASTY WITH STENT PLACEMENT      stenting twice    EYE SURGERY      HIATAL HERNIA REPAIR      late     HYSTERECTOMY, TOTAL ABDOMINAL      in     LARYNGOSCOPY N/A 3/22/2022    SUSPENSON LARYNGOSCOPY WITH JET VENT, DILATION performed by Dianne Prater MD at [address] 3/28/2022    SUSPENSION MICROLARYNGOSCOPY WITH BALLOON DILATION AND JET VENT, KENALOG INJECTION performed by Dianne Prater MD at [address] N/A 4/7/2022    Suspension Laryngoscopy  Lateral of Right True Vocal Cord, With Steroid Injection of Larynx And Tracheostomy Tube Change, debridement performed by Wes Parker MD at [address] [facility]      in [address] [facility] 4/1/2022    TRACHEOTOMY TUBE REPLACEMENT performed by Dianne Prater MD at [facility]       Medications Prior to Admission:      Prior to Admission medications    Medication Sig Start Date End Date Taking? Authorizing Provider   guaiFENesin 400 MG tablet Take 400 mg by mouth 2 times daily as needed for Cough   Yes Historical Provider, MD   lisinopril (PRINIVIL;ZESTRIL) 10 MG tablet Take 30 mg by mouth daily   Yes Historical Provider, MD   predniSONE (DELTASONE) 10 MG tablet Take 1 (one) tablet by mouth once daily. 4/25/22   WYATT Aguero CNP   budesonide (PULMICORT) 0.5 MG/2ML nebulizer suspension Take 2 mLs by nebulization 2 times daily 4/25/22   WYATT Aguero CNP   LANTUS SOLOSTAR 100 UNIT/ML injection pen 20 units in PM 4/11/22   Historical Provider, MD   insulin NPH (HUMULIN N;NOVOLIN N) 100 UNIT/ML injection vial Inject into the skin 2 times daily (before meals) Takes BID on the SS    Historical Provider, MD   metoprolol succinate (TOPROL XL) 25 MG extended release tablet Take 1 tablet by mouth daily 4/8/22   Jackalyn Gottron, MD   hydrOXYzine (VISTARIL) 25 MG capsule Take 1 capsule by mouth 4 times daily as needed for Anxiety 1/27/22   Tate Bullard MD   albuterol sulfate  (90 Base) MCG/ACT inhaler Inhale 2 puffs into the lungs every 6 hours as needed for Wheezing 1/27/22   Tate Bullard MD   miconazole (MICOTIN) 2 % powder Apply topically 2 times daily.  1/27/22   Tate Bullard MD diclofenac sodium (VOLTAREN) 1 % GEL Apply 2 g topically 4 times daily as needed for Pain 1/27/22   Adolfo Ku MD   furosemide (LASIX) 40 MG tablet Take 1 tablet by mouth daily 1/28/22   Adolfo Ku MD   docusate sodium (COLACE) 100 MG capsule Take 1 capsule by mouth 2 times daily  Patient not taking: Reported on 6/7/2022 1/27/22   Adolfo Ku MD   polycarbophil [facility]) 625 MG tablet Take 1 tablet by mouth daily  Patient not taking: Reported on 6/7/2022 1/28/22   Adolfo Ku MD   melatonin 3 MG TABS tablet Take 2 tablets by mouth nightly 1/27/22   Adolfo Ku MD   famotidine (PEPCID) 20 MG tablet Take 1 tablet by mouth 2 times daily  Patient not taking: Reported on 6/7/2022 1/27/22   Adolfo Ku MD   OXYGEN Inhale 2 L/min into the lungs continuous prn (during activities such as walking) 1/27/22   Adolfo Ku MD   rosuvastatin (CRESTOR) 10 MG tablet Take 10 mg by mouth daily    Historical Provider, MD   nitroGLYCERIN (NITROSTAT) 0.4 MG SL tablet Place 0.4 mg under the tongue every 5 minutes as needed for Chest pain up to max of 3 total doses. If no relief after 1 dose, call 911.      Historical Provider, MD   aspirin 81 MG chewable tablet Take 81 mg by mouth daily    Historical Provider, MD       Current Facility-Administered Medications   Medication Dose Route Frequency Provider Last Rate Last Admin    albuterol sulfate  (90 Base) MCG/ACT inhaler 2 puff  2 puff Inhalation Q6H PRN Demetrio Bell MD        aspirin chewable tablet 81 mg  81 mg Oral Daily Demetrio Bell MD   81 mg at 06/07/22 0829    budesonide (PULMICORT) nebulizer suspension 500 mcg  0.5 mg Nebulization BID Demetrio Bell MD   500 mcg at 06/07/22 0856    docusate sodium (COLACE) capsule 100 mg  100 mg Oral BID Demetrio Bell MD        famotidine (PEPCID) tablet 20 mg  20 mg Oral BID Demetrio Bell MD   20 mg at 06/07/22 0830    furosemide (LASIX) tablet 40 mg  40 mg Oral Daily Kike Jaylon Calderon MD   40 mg at 06/07/22 0826    hydrOXYzine pamoate (VISTARIL) capsule 25 mg  25 mg Oral 4x Daily PRN Ana Pierre MD        melatonin tablet 6 mg  6 mg Oral Nightly Ana Pierre MD   6 mg at 06/07/22 0059    metoprolol succinate (TOPROL XL) extended release tablet 25 mg  25 mg Oral Daily Ana Pierre MD   25 mg at 06/07/22 0827    miconazole (MICOTIN) 2 % powder   Topical BID Ana Pierre MD   Given at 06/07/22 0825    nitroGLYCERIN (NITROSTAT) SL tablet 0.4 mg  0.4 mg SubLINGual Q5 Min PRN Ana Pierre MD        rosuvastatin (CRESTOR) tablet 10 mg  10 mg Oral Daily Ana Pierre MD        predniSONE (DELTASONE) tablet 10 mg  10 mg Oral Daily Ana Pierre MD   10 mg at 06/07/22 0826    insulin lispro (HUMALOG) injection vial 0-12 Units  0-12 Units SubCUTAneous TID WC Ana Pierre MD        insulin lispro (HUMALOG) injection vial 0-6 Units  0-6 Units SubCUTAneous Nightly Ana Pierre MD   4 Units at 06/07/22 0055    insulin glargine (LANTUS) injection vial 10 Units  10 Units SubCUTAneous BID Ana Pierre MD   10 Units at 06/07/22 0335    sodium chloride flush 0.9 % injection 10 mL  10 mL IntraVENous 2 times per day Ana Pierre MD   10 mL at 06/07/22 0824    sodium chloride flush 0.9 % injection 10 mL  10 mL IntraVENous PRN Ana Pierre MD        0.9 % sodium chloride infusion   IntraVENous PRN Ana Pierre MD        enoxaparin (LOVENOX) injection 40 mg  40 mg SubCUTAneous Daily Ana Pierre MD        ondansetron (ZOFRAN-ODT) disintegrating tablet 4 mg  4 mg Oral Q8H PRN Ana Pierre MD        Or    ondansetron (ZOFRAN) injection 4 mg  4 mg IntraVENous Q6H PRN Ana Pierre MD        polyethylene glycol (GLYCOLAX) packet 17 g  17 g Oral Daily PRN Ana Pierre MD        acetaminophen (TYLENOL) tablet 650 mg  650 mg Oral Q6H PRN Ana Pierre MD        Or    acetaminophen (TYLENOL) suppository 650 mg  650 mg Rectal Q6H PRN Fede Kennedy MD           Allergies:  Metformin and related, Codeine, Meperidine hcl, Sulfa antibiotics, and Sumatriptan    Social History:    TOBACCO:   reports that she has never smoked. She has never used smokeless tobacco.  ETOH:   reports previous alcohol use. Family History:        Problem Relation Age of Onset    Parkinson's Disease Father     Lung Cancer Father          Review of Systems -   General ROS: negative  Psychological ROS: negative  Hematological and Lymphatic ROS: No history of blood clots or bleeding disorder. Respiratory ROS: no cough, shortness of breath, or wheezing  Cardiovascular ROS: As per HPI  Gastrointestinal ROS: negative  Genito-Urinary ROS: no dysuria, trouble voiding, or hematuria  Musculoskeletal ROS: negative  Neurological ROS: no TIA or stroke symptoms  Dermatological ROS: negative    All others reviewed and are negative.        /64   Pulse 69   Temp 98.2 °F (36.8 °C) (Oral)   Resp 16   Wt 198 lb 13.7 oz (90.2 kg)   LMP  (LMP Unknown)   SpO2 (P) 100%   BMI 36.37 kg/m²       Physical Examination:   General appearance - alert, in no distress  Mental status - alert, oriented to person, place, and time  Neck - supple, no significant adenopathy, trach in place  Chest - clear to auscultation, no wheezes, rales or rhonchi, symmetric air entry  Heart - normal rate, regular rhythm, normal S1, S2, no murmurs, rubs, clicks or gallops  Abdomen - soft, nontender, nondistended, no masses or organomegaly  Neurological - alert, oriented, normal speech, no focal findings or movement disorder noted  Musculoskeletal - no joint tenderness, deformity or swelling  Extremities - peripheral pulses normal, no pedal edema, no clubbing or cyanosis  Skin - normal coloration and turgor, no rashes, no suspicious skin lesions noted      LABS:    Recent Labs     06/06/22  1720 06/06/22  2058 06/07/22  0632   TROPONINT < 0.010 < 0.010 < 0.010     CBC:   Lab Results   Component Value Date WBC 9.6 06/06/2022    RBC 3.36 06/06/2022    HGB 9.9 06/06/2022    HCT 30.9 06/06/2022    MCV 92.0 06/06/2022    MCH 29.5 06/06/2022    MCHC 32.0 06/06/2022     06/06/2022    MPV 10.5 06/06/2022     BMP:    Lab Results   Component Value Date     06/06/2022    K 3.8 06/06/2022     06/06/2022    CO2 26 06/06/2022    BUN 19 06/06/2022    LABALBU 3.9 06/06/2022    CREATININE 0.7 06/06/2022    CALCIUM 8.6 06/06/2022    LABGLOM 83 06/06/2022    GLUCOSE 214 06/06/2022     Hepatic Function Panel:    Lab Results   Component Value Date    ALKPHOS 66 06/06/2022    ALT 15 06/06/2022    AST 17 06/06/2022    PROT 6.1 06/06/2022    BILITOT 0.2 06/06/2022    BILIDIR <0.2 03/18/2022    LABALBU 3.9 06/06/2022     Magnesium:    Lab Results   Component Value Date    MG 2.0 04/06/2022     Warfarin PT/INR:  No components found for: PTPATWAR, PTINRWAR  HgBA1c:    Lab Results   Component Value Date    LABA1C 9.9 12/21/2021     FLP:  No results found for: TRIG, HDL, LDLCALC, LDLDIRECT, LABVLDL  TSH:    Lab Results   Component Value Date    TSH 3.100 06/07/2022     BNP: No components found for: PRO-BNP      Assessment/Plan:    Patient Active Problem List   Diagnosis    COVID-19    Hypoxia    Acute respiratory failure with hypoxia ([address]    Debility    Physical deconditioning    History of COVID-19    Dysarthria    Primary hypertension    Type 2 diabetes mellitus with hyperglycemia, with long-term current use of insulin (HCC)    Obesity (BMI 30-39. 9)    History of coronary artery disease    History of coronary angioplasty with insertion of stent    Cardiomyopathy ([address]    Critical illness myopathy    Stridor    Posterior glottic stenosis    Shortness of breath    Acute respiratory failure (HCC)    Acute hypoxemic respiratory failure (HCC)    Tracheostomy dependence ([address]    Hoarseness    Unstable angina (HCC)    [facility] with [facility]     Briefly this is a 62-year-old female with history of tracheostomy due to complication of XVKSQ-02 infection in December 2021, CAD status post PCI, diabetes who presents with symptoms of worsening shortness of breath and chest pain. Electrocardiogram has been benign and serial enzymes were negative for ACS. Due to the high risk nature of the patient cardiology was consulted for evaluation of the chest pain. Telemetry  N.p.o.  Continue pulmonary therapies  Patient is scheduled for ENT surgery in the near future  We will get Lexiscan stress test to evaluate for inducible ischemia  Further recommendation based on results and clinical course        Please do note hesitate to contact me for any further questions. Thank you for the opportunity to be involved in this patient's care.     Code Status: Full Code    Electronically signed by Noe Renner MD on 6/7/2022 at 9:02 AM

## 2022-06-07 NOTE — H&P
Patient: Dorothe Ahumada    Unit/Bed: 5A-45/289-R    YOB: 1950    MRN: [id number]    Acct: [de-identified]     Admitting Diagnosis: Unstable angina (HCC) [I20.0]  Dyspnea and respiratory abnormalities [R06.00, R06.89]  Acute chest pain [R07.9]    Admit Date:  6/6/2022    CC: Shortness of breath, chest pain/chest tightness x 2 weeks. HPI :  42-year-old female patient with history of CAD and tracheostomy dependent respiratory failure since   COVID complication December 3836. H/o intubation and mechanical ventilation x2 weeks due to complicated COVID 19 pneumonia followed by tracheostomy. Presented to the ED with worsening chest pressure/tightness today. Increased chest pressure and dyspnea on exertion for the past 2 weeks. History of CAD x2 stents. Follows up with cardiologist in [facility]. 6/10 substernal chest pain radiating to back. Clinically stable at the time of interview. Initial vital signs in the ED temperature 37.1 °C, respiratory 22, heart rate 85, blood pressure 134/85, oxygen saturation 100% on trach mask. Admission labs unremarkable sodium 139, potassium 3.8, creatinine 0.7, blood sugar 295, proBNP 156.7, troponin less than 0.010. WBC 9.6, hemoglobin 9.9, MCV 92.0, platelets 371 and D-dimer 962. Review of Systems   Constitutional: Negative. HENT: Negative. Eyes: Negative. Respiratory: Positive for chest tightness and shortness of breath. Cardiovascular: Positive for chest pain. Gastrointestinal: Negative. Genitourinary: Negative. Musculoskeletal: Negative. Skin: Negative. Neurological: Negative. Hematological: Negative. Psychiatric/Behavioral: Negative.         Past Medical History:        Diagnosis Date    Arthritis     Coronary artery disease     COVID     Hyperlipidemia     Primary hypertension     Type 2 diabetes mellitus ([facility] 2018       Past Surgical History:        Procedure Laterality Date    CARDIAC SURGERY  CATARACT REMOVAL Bilateral      SECTION      3 times    CORONARY ANGIOPLASTY WITH STENT PLACEMENT      stenting twice    EYE SURGERY      HIATAL HERNIA REPAIR      late     HYSTERECTOMY, TOTAL ABDOMINAL      in     LARYNGOSCOPY N/A 3/22/2022    SUSPENSON LARYNGOSCOPY WITH JET VENT, DILATION performed by Kendy Holcomb MD at [address] [place] [address] [address] 3/28/2022    SUSPENSION MICROLARYNGOSCOPY WITH BALLOON DILATION AND JET VENT, KENALOG INJECTION performed by Kendy Holcomb MD at [address] [place] [address] E 2022    Suspension Laryngoscopy  Lateral of Right True Vocal Cord, With Steroid Injection of Larynx And Tracheostomy Tube Change, debridement performed by Garo De Los Santos MD at [address] [place] [address]      in [address] [place] [address],[facility] 2022    TRACHEOTOMY TUBE REPLACEMENT performed by Kendy Holcomb MD at [facility]       Medications Prior to Admission:    Prior to Admission medications    Medication Sig Start Date End Date Taking? Authorizing Provider   guaiFENesin 400 MG tablet Take 400 mg by mouth 2 times daily as needed for Cough   Yes Historical Provider, MD   predniSONE (DELTASONE) 10 MG tablet Take 1 (one) tablet by mouth once daily.  22   WYATT Aguero CNP   budesonide (PULMICORT) 0.5 MG/2ML nebulizer suspension Take 2 mLs by nebulization 2 times daily 22   WYATT Aguero CNP   LANTUS SOLOSTAR 100 UNIT/ML injection pen 20 units in PM 22   Historical Provider, MD   insulin NPH (HUMULIN N;NOVOLIN N) 100 UNIT/ML injection vial Inject into the skin 2 times daily (before meals) Takes BID on the SS    Historical Provider, MD   metoprolol succinate (TOPROL XL) 25 MG extended release tablet Take 1 tablet by mouth daily 22   Janny Bustillo MD   hydrOXYzine (VISTARIL) 25 MG capsule Take 1 capsule by mouth 4 times daily as needed for Anxiety 22   Tl Santiago MD   albuterol sulfate  (90 Base) MCG/ACT inhaler Inhale 2 puffs into the lungs every 6 hours as needed for Wheezing 1/27/22   Andres Kramer MD   miconazole (MICOTIN) 2 % powder Apply topically 2 times daily. 1/27/22   Andres Kramer MD   diclofenac sodium (VOLTAREN) 1 % GEL Apply 2 g topically 4 times daily as needed for Pain 1/27/22   Andres Kramer MD   furosemide (LASIX) 40 MG tablet Take 1 tablet by mouth daily 1/28/22   Andres Kramer MD   docusate sodium (COLACE) 100 MG capsule Take 1 capsule by mouth 2 times daily  Patient not taking: Reported on 6/7/2022 1/27/22   Andres Kramer MD   polycarbophil [facility]) 625 MG tablet Take 1 tablet by mouth daily  Patient not taking: Reported on 6/7/2022 1/28/22   Andres Kramer MD   melatonin 3 MG TABS tablet Take 2 tablets by mouth nightly 1/27/22   Andres Kramer MD   famotidine (PEPCID) 20 MG tablet Take 1 tablet by mouth 2 times daily  Patient not taking: Reported on 6/7/2022 1/27/22   Andres Kramer MD   OXYGEN Inhale 2 L/min into the lungs continuous prn (during activities such as walking) 1/27/22   Andres Kramer MD   rosuvastatin (CRESTOR) 10 MG tablet Take 10 mg by mouth daily    Historical Provider, MD   nitroGLYCERIN (NITROSTAT) 0.4 MG SL tablet Place 0.4 mg under the tongue every 5 minutes as needed for Chest pain up to max of 3 total doses. If no relief after 1 dose, call 911. Historical Provider, MD   aspirin 81 MG chewable tablet Take 81 mg by mouth daily    Historical Provider, MD       Allergies:    Metformin and related, Codeine, Meperidine hcl, Sulfa antibiotics, and Sumatriptan    Social History:    TOBACCO:   reports that she has never smoked. She has never used smokeless tobacco.    ETOH:   reports previous alcohol use.     Family History:        Problem Relation Age of Onset    Parkinson's Disease Father     Lung Cancer Father        Objective:   BP 99/71   Pulse 62   Temp 97.7 °F (36.5 °C) (Oral)   Resp 16   Wt 198 lb 13.7 oz (90.2 kg)   LMP  (LMP Unknown) SpO2 99%   BMI 36.37 kg/m²   No intake or output data in the 24 hours ending 06/07/22 2987    Physical Exam  Vitals and nursing note reviewed. Constitutional:       General: She is not in acute distress. Appearance: Normal appearance. She is obese. She is not ill-appearing, toxic-appearing or diaphoretic. HENT:      Head: Normocephalic and atraumatic. Comments: Tracheostomy in situ     Right Ear: External ear normal.      Left Ear: External ear normal.      Nose: Nose normal. No congestion or rhinorrhea. Mouth/Throat:      Mouth: Mucous membranes are moist.      Pharynx: Oropharynx is clear. No oropharyngeal exudate or posterior oropharyngeal erythema. Eyes:      General: No scleral icterus. Right eye: No discharge. Left eye: No discharge. Extraocular Movements: Extraocular movements intact. Conjunctiva/sclera: Conjunctivae normal.      Pupils: Pupils are equal, round, and reactive to light. Neck:      Vascular: No carotid bruit. Cardiovascular:      Rate and Rhythm: Normal rate and regular rhythm. Pulses: Normal pulses. Heart sounds: Normal heart sounds. No murmur heard. No friction rub. No gallop. Pulmonary:      Effort: Pulmonary effort is normal. No respiratory distress. Breath sounds: Normal breath sounds. No stridor. No wheezing, rhonchi or rales. Chest:      Chest wall: No tenderness. Abdominal:      General: Bowel sounds are normal. There is no distension. Palpations: Abdomen is soft. There is no mass. Tenderness: There is no abdominal tenderness. There is no right CVA tenderness, left CVA tenderness, guarding or rebound. Hernia: No hernia is present. Musculoskeletal:         General: No swelling, tenderness, deformity or signs of injury. Normal range of motion. Cervical back: Normal range of motion and neck supple. No rigidity or tenderness. Right lower leg: Edema present. Left lower leg: No edema. Lymphadenopathy:      Cervical: No cervical adenopathy. Skin:     General: Skin is warm. Coloration: Skin is not jaundiced or pale. Findings: No bruising, erythema, lesion or rash. Neurological:      General: No focal deficit present. Mental Status: She is alert and oriented to person, place, and time. Mental status is at baseline. Cranial Nerves: No cranial nerve deficit. Sensory: No sensory deficit. Motor: No weakness. Coordination: Coordination normal.      Gait: Gait normal.      Deep Tendon Reflexes: Reflexes normal.   Psychiatric:         Mood and Affect: Mood normal.         Behavior: Behavior normal.         Thought Content: Thought content normal.         Judgment: Judgment normal.     :    Medications:    aspirin  81 mg Oral Daily    budesonide  0.5 mg Nebulization BID    docusate sodium  100 mg Oral BID    famotidine  20 mg Oral BID    furosemide  40 mg Oral Daily    melatonin  6 mg Oral Nightly    metoprolol succinate  25 mg Oral Daily    miconazole   Topical BID    rosuvastatin  10 mg Oral Daily    predniSONE  10 mg Oral Daily    insulin lispro  0-12 Units SubCUTAneous TID WC    insulin lispro  0-6 Units SubCUTAneous Nightly    insulin glargine  10 Units SubCUTAneous BID    sodium chloride flush  10 mL IntraVENous 2 times per day    enoxaparin  40 mg SubCUTAneous Daily       Continuous Infusions:   sodium chloride         PRN Meds:albuterol sulfate HFA, hydrOXYzine pamoate, nitroGLYCERIN, sodium chloride flush, sodium chloride, ondansetron **OR** ondansetron, polyethylene glycol, acetaminophen **OR** acetaminophen    Data:    CBC:   Recent Labs     06/06/22  1720   WBC 9.6   RBC 3.36*   HGB 9.9*   HCT 30.9*   MCV 92.0          BMP:   Recent Labs     06/06/22  1720      K 3.8      CO2 26   BUN 19   CREATININE 0.7       PT/INR: No results for input(s): PROTIME, INR in the last 72 hours.     LIVER PROFILE:   Recent Labs 06/06/22  1720   AST 17   ALT 15   BILITOT 0.2*   ALKPHOS 66        ABG. None. URINALYSIS. None. SEROLOGY  None. TUMOR MARKERS. None. MICROBIOLOGY   None. HISTOPATHOLOGY. None. TOXICOLOGY. None. ENDOSCOPE STUDIES. None. SURGICAL PROCEDURES. None. CARDIAC PROCEDURES. None. IR PROCEDURES. None. RADIOLOGY. CTA chest-6/6/2022. FINDINGS:  Evaluation limited by patient motion artifact. No pulmonary emboli. No thoracic aortic aneurysm or dissection. Atherosclerotic calcifications are seen at the aorta and coronary   Arteries. Tracheostomy tube is present    No hilar or mediastinal adenopathy. No pericardial fluid collection. No pleural fluid collections. No pneumothorax. Bilateral lower lobe dependent atelectasis    Visualized liver and spleen do not demonstrate any acute process    No thoracic compression fracture.     FINDINGS:  1. No pulmonary emboli. 2. No thoracic aortic aneurysm or dissection    3. Bilateral lower lobe dependent atelectasis. No pneumonia    4. Tracheostomy     This document has been electronically signed by: Jane Jefferson MD on   06/06/2022 07:57 PM.      Adria Canavan. SUMMARY:   Ejection fraction is visually estimated at 60%. Overall left ventricular function is normal.    The aortic valve leaflets were not well visualized. Aortic valve appears tricuspid. Aortic valve leaflets are somewhat thickened. Myxomatotic degeneration of mitral valve. Calcification of the mitral valve noted. ASSESSMENT AND  PLAN. 1.PULMONARY. Chronic hypoxic respiratory failure tracheostomy dependent. S/p [id number] complication December 0744. H/o pulmonary hypertension. CTA chest negative for PE       2.CARDIOVASCULAR. CAD w/ unstable angina-telemetry, aspirin, nitro, metoprolol, statin ,serial troponin. Cardiology consult for inpatient cardiac stress test.    3.ENDO.      Hyperglycemia due to type II DM on sliding scale insulin Lantus. TSH and A1c check. 4.HEM-ONC. Right lower extremity swelling- Doppler to rule out DVT    5.NUTRITION. Obesity with a BMI of 35.30-needs regular exercise diet control for weight loss     management. 6.DISPO. Planned d/c to home vs rehab soon.     Electronically signed by Regi Ghotra MD on 6/7/2022 at 7:21 AM

## 2022-06-07 NOTE — ED NOTES
Pt expressing concern over blood sugar stating she usually checks it at this time of night and takes long acting insulin. Pt and  updated on plan of care.       Mikayla Robles RN  06/06/22 4777

## 2022-06-07 NOTE — PROGRESS NOTES
Hospitalist Progress Note    Patient:  Deja Christy      Unit/Bed:6K-12/012-A    YOB: 1950    MRN: [id number]       Acct: [de-identified]     PCP: Rashard Blanchard    Date of Admission: 6/6/2022    Assessment/Plan:    1. Atypical chest pain: ACS less likely since chest pain is pleuritic chest pain non exertional related. ? ILEANA vs GERD. Trop (-) x 2. EKG (6/6/22) NSR, no ST elevation, no ST depression, no TWI. Hx CAD s/p stent and restent LAD. CTA chest negative intrathoracic abnormalities. Card was consulted. Lexiscan stress test was order for inducible ischemia. Plan: cont PPI, start Buspar    2. SOB: 2/2 post Covid Syndrome vs anxiety . Hx COVID infection 12/2021. She was intubated and develop tracheal stenosis. Trach place and has been follow up with ENT. Plan: cont Prednisone (home med), Home O2 eval    3. Chronic respiratory failure: Trach dependence. Sd Patel is clean without oozing, bleeding. Plan: cont follow up with ENT for surgery. 4. IDDM2: A1C 8.6 on admission. Patient has been on Steroid which can induce hyperglcyemia. Plan: cont Lantus 10 unit BID, Lispro SSI, hypoglycemia protocol    5. Hx CAD: s/p PCI of LAD 2008. On GDMT (Toprol 25, Lisinopril) and Aspirin, Cresto daily. 6. GERD: Cont Pepcid    7. ILEANA: Start Buspar 10mg TID    Expected discharge date:  6/8/22    Disposition:    [x] Home       [] TCU       [] Rehab       [] Psych       [] SNF       [] [facility]       [] Other-    Chief Complaint: Chest pain and SOB    Hospital Course: Lulu Aguilar is 73years old female who presented to the hospital for chest pain, shortness of breath. 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 Patient is quite chest pain left parotid chest pain not exertional related.   Denies any diaphoresis, pressure in her chest, chest pain radiation to her neck or arms. Chest pain described as intermittent with pain 6-7 out of 10. In the ED, vital signs within normal limited with O2 sat 100% on trach mask. Troponin negative x2, EKG showed normal sinus rhythm without T wave inversion, ST elevation, ST depression. BMP within normal limited. CTA of the chest was negative finding. Patient was admitted and managed for unstable angina. Cardiology was consulted. Subjective (past 24 hours): No event overnight. Patient was on recliner reports no episode of chest pain since admission. No episode of chest pain since admission. Denies any pressure in her chest, lightheaded, dizziness, nausea, vomiting, shortness of breath. ROS (12 point review of systems completed. Pertinent positives noted. Otherwise ROS is negative). Medications:  Reviewed    Infusion Medications    sodium chloride       Scheduled Medications    aspirin  81 mg Oral Daily    budesonide  0.5 mg Nebulization BID    docusate sodium  100 mg Oral BID    famotidine  20 mg Oral BID    furosemide  40 mg Oral Daily    melatonin  6 mg Oral Nightly    metoprolol succinate  25 mg Oral Daily    miconazole   Topical BID    rosuvastatin  10 mg Oral Daily    predniSONE  10 mg Oral Daily    insulin lispro  0-12 Units SubCUTAneous TID WC    insulin lispro  0-6 Units SubCUTAneous Nightly    insulin glargine  10 Units SubCUTAneous BID    sodium chloride flush  10 mL IntraVENous 2 times per day    enoxaparin  40 mg SubCUTAneous Daily     PRN Meds: albuterol sulfate HFA, hydrOXYzine pamoate, nitroGLYCERIN, sodium chloride flush, sodium chloride, ondansetron **OR** ondansetron, polyethylene glycol, acetaminophen **OR** acetaminophen      Intake/Output Summary (Last 24 hours) at 6/7/2022 1627  Last data filed at 6/7/2022 1116  Gross per 24 hour   Intake 0 ml   Output --   Net 0 ml       Diet:  ADULT DIET; Regular; 4 carb choices (60 gm/meal);  Low Sodium (2 gm)    Exam:  /62 Pulse 67   Temp 98.5 °F (36.9 °C) (Oral)   Resp 18   Wt 198 lb 13.7 oz (90.2 kg)   LMP  (LMP Unknown)   SpO2 100%   BMI 36.37 kg/m²     General appearance: No apparent distress, appears stated age and cooperative. HEENT: Pupils equal, round, and reactive to light. Conjunctivae/corneas clear. Neck: Supple, with full range of motion. No jugular venous distention. Trachea midline. Trach mask clean, no oozing, no bleeding. Respiratory:  Normal respiratory effort. Clear to auscultation, bilaterally without Rales/Wheezes/Rhonchi. Cardiovascular: Regular rate and rhythm with normal S1/S2 without murmurs, rubs or gallops. Abdomen: Soft, non-tender, non-distended with normal bowel sounds. Musculoskeletal: passive and active ROM x 4 extremities. Skin: Skin color, texture, turgor normal.  No rashes or lesions. Neurologic:  Neurovascularly intact without any focal sensory/motor deficits. Cranial nerves: II-XII intact, grossly non-focal.  Psychiatric: Alert and oriented, thought content appropriate, normal insight  Capillary Refill: Brisk,< 3 seconds   Peripheral Pulses: +2 palpable, equal bilaterally       Labs:   Recent Labs     06/06/22  1720 06/07/22  0632   WBC 9.6 9.5   HGB 9.9* 10.6*   HCT 30.9* 33.5*    222     Recent Labs     06/06/22  1720 06/07/22  0632    141   K 3.8 3.1*    105   CO2 26 22*   BUN 19 15   CREATININE 0.7 0.6   CALCIUM 8.6 9.1     Recent Labs     06/06/22  1720   AST 17   ALT 15   BILITOT 0.2*   ALKPHOS 66     No results for input(s): INR in the last 72 hours. No results for input(s): Johan Snowball in the last 72 hours.     Microbiology:      Urinalysis:      Lab Results   Component Value Date    NITRU NEGATIVE 01/10/2022    WBCUA 15-25 01/10/2022    BACTERIA NONE 01/10/2022    RBCUA > 200 01/10/2022    BLOODU LARGE 01/10/2022    SPECGRAV 1.026 12/31/2021    GLUCOSEU NEGATIVE 01/10/2022       Radiology:  CTA CHEST W WO CONTRAST - r/o Pulmonary Embolism   Final Result   Impression:   1. No pulmonary emboli. 2. No thoracic aortic aneurysm or dissection   3. Bilateral lower lobe dependent atelectasis. No pneumonia   4. Tracheostomy      This document has been electronically signed by: Ilene Freeman MD on    06/06/2022 07:57 PM      All CTs at this facility use dose modulation techniques and iterative    reconstructions, and/or weight-based dosing   when appropriate to reduce radiation to a low as reasonably achievable. XR CHEST PORTABLE   Final Result   Impression:   1.  No acute infiltrate      This document has been electronically signed by: Ilene Freeman MD on    06/06/2022 07:19 PM      VL DUP LOWER EXTREMITY VENOUS BILATERAL    (Results Pending)       DVT prophylaxis: [x] Lovenox                                 [] SCDs                                 [] SQ Heparin                                 [] Encourage ambulation           [] Already on Anticoagulation     Code Status: Full Code      Tele:   [x] yes             [] no    Electronically signed by Dax Morris DO on 6/7/2022 at 4:27 PM

## 2022-06-07 NOTE — ED NOTES
Pt assisted to restroom via wheelchair. Updated pt and  on plan of care. Call light in reach.      Connie Ray RN  06/06/22 2002

## 2022-06-07 NOTE — ED NOTES
Pt and  assisted to clean cannula.  Pt given turkey sandwich per request.      Rayna Ortega RN  06/06/22 8804

## 2022-06-07 NOTE — PLAN OF CARE
Problem: Safety - Adult  Goal: Free from fall injury  Outcome: Progressing  Flowsheets (Taken 6/7/2022 1222)  Free From Fall Injury:   Instruct family/caregiver on patient safety   Based on caregiver fall risk screen, instruct family/caregiver to ask for assistance with transferring infant if caregiver noted to have fall risk factors     Problem: Chronic Conditions and Co-morbidities  Goal: Patient's chronic conditions and co-morbidity symptoms are monitored and maintained or improved  Outcome: Progressing  Flowsheets (Taken 6/7/2022 1222)  Care Plan - Patient's Chronic Conditions and Co-Morbidity Symptoms are Monitored and Maintained or Improved:   Monitor and assess patient's chronic conditions and comorbid symptoms for stability, deterioration, or improvement   Collaborate with multidisciplinary team to address chronic and comorbid conditions and prevent exacerbation or deterioration

## 2022-06-07 NOTE — ED PROVIDER NOTES
[facility] ENCOUNTER          Pt Name: Deja Christy  MRN: [id number]  [place] 1950  Date of evaluation: 6/6/2022  Treating Resident Physician: Samara Arango MD  Supervising Physician: Jean Menon MD  History obtained from the patient. CHIEF COMPLAINT       Chief Complaint   Patient presents with    Shortness of Breath     trach mask    Chest Pain     discomfort           HISTORY OF PRESENT ILLNESS    HPI  Deja Christy is a 73 y.o. female who has a history of chronic tracheostomy and cardiac stents > 10 yrs ago who presents to the emergency department for evaluation of chest pain. Patient states that chest pain which she describes as pain again this afternoon so decided come to the ED for evaluation. Patient describes pain as tightness to chest and a feeling of about sensation around chest.  Patient states the pain at worst was an 8 out of 10 and is currently a 4 out of 10. Patient has associated shortness of breath. Patient states that there are no modifying factors. Patient denies similar symptoms in the past.  The patient has no other acute complaints at this time. REVIEW OF SYSTEMS   Review of Systems   Constitutional: Negative for fatigue and fever. HENT: Negative for ear pain, rhinorrhea and sore throat. Eyes: Negative for pain and discharge. Respiratory: Positive for shortness of breath. Negative for cough and wheezing. Cardiovascular: Positive for chest pain. Negative for palpitations and leg swelling. Gastrointestinal: Negative for abdominal distention, constipation, diarrhea, nausea and vomiting. Endocrine: Negative for polydipsia and polyuria. Genitourinary: Negative for difficulty urinating and dysuria. Musculoskeletal: Negative for arthralgias. Skin: Negative for color change, pallor and rash. Neurological: Negative for dizziness, seizures, syncope, weakness and numbness. PRN, Sue Edouard MD    melatonin tablet 6 mg, 6 mg, Oral, Nightly, Sue Edouard MD, 6 mg at 06/07/22 0059    metoprolol succinate (TOPROL XL) extended release tablet 25 mg, 25 mg, Oral, Daily, Sue Edouard MD    miconazole (MICOTIN) 2 % powder, , Topical, BID, Sue Edouard MD, Given at 06/07/22 0059    nitroGLYCERIN (NITROSTAT) SL tablet 0.4 mg, 0.4 mg, SubLINGual, Q5 Min PRN, Sue Edouard MD    rosuvastatin (CRESTOR) tablet 10 mg, 10 mg, Oral, Daily, Sue Edouard MD    predniSONE (DELTASONE) tablet 10 mg, 10 mg, Oral, Daily, Sue Edouard MD    insulin lispro (HUMALOG) injection vial 0-12 Units, 0-12 Units, SubCUTAneous, TID WC, Sue Edouard MD    insulin lispro (HUMALOG) injection vial 0-6 Units, 0-6 Units, SubCUTAneous, Nightly, Sue Edouard MD, 4 Units at 06/07/22 0055    insulin glargine (LANTUS;BASAGLAR) injection pen 10 Units, 10 Units, SubCUTAneous, BID, Sue Edouard MD    sodium chloride flush 0.9 % injection 10 mL, 10 mL, IntraVENous, 2 times per day, Sue Edouard MD, 10 mL at 06/07/22 0059    sodium chloride flush 0.9 % injection 10 mL, 10 mL, IntraVENous, PRN, Sue Edouard MD    0.9 % sodium chloride infusion, , IntraVENous, PRN, Sue Edouard MD    enoxaparin (LOVENOX) injection 40 mg, 40 mg, SubCUTAneous, Daily, Sue Edouard MD    ondansetron (ZOFRAN-ODT) disintegrating tablet 4 mg, 4 mg, Oral, Q8H PRN **OR** ondansetron (ZOFRAN) injection 4 mg, 4 mg, IntraVENous, Q6H PRN, Sue Edouard MD    polyethylene glycol (GLYCOLAX) packet 17 g, 17 g, Oral, Daily PRN, Sue Edouard MD    acetaminophen (TYLENOL) tablet 650 mg, 650 mg, Oral, Q6H PRN **OR** acetaminophen (TYLENOL) suppository 650 mg, 650 mg, Rectal, Q6H PRN, Sue Edouard MD      SOCIAL HISTORY     Social History     Social History Narrative    Not on file     Social History     Tobacco Use    Smoking status: Never Smoker    Smokeless tobacco: Never Used   Vaping Use  Vaping Use: Never used    Passive vaping exposure: Yes   Substance Use Topics    Alcohol use: Not Currently     Comment: drinks 1 glass of wine cooler or wine about 3 times per year    Drug use: Never         ALLERGIES     Allergies   Allergen Reactions    Metformin And Related Other (See Comments)     diarrhea    Codeine Nausea And Vomiting    Meperidine Hcl Nausea And Vomiting    Sulfa Antibiotics Nausea And Vomiting    Sumatriptan Nausea And Vomiting         FAMILY HISTORY     Family History   Problem Relation Age of Onset    Parkinson's Disease Father     Lung Cancer Father          PREVIOUS RECORDS   Previous records reviewed from current to 2008    Rylan 37     ED Triage Vitals   BP Temp Temp Source Heart Rate Resp SpO2 Height Weight   06/06/22 1722 06/06/22 1722 06/06/22 1722 06/06/22 1717 06/06/22 1717 06/06/22 1717 -- 06/06/22 1717   135/85 98.7 °F (37.1 °C) Oral 85 22 100 %  193 lb (87.5 kg)     Initial vital signs and nursing assessment reviewed and normal. Body mass index is 35.3 kg/m². Pulsoximetry is normal per my interpretation. Additional Vital Signs:  Vitals:    06/06/22 2345   BP: (!) 114/59   Pulse: 74   Resp: 18   Temp: 98.1 °F (36.7 °C)   SpO2: 100%       Physical Exam  Constitutional:       Appearance: Normal appearance. HENT:      Head: Normocephalic. Right Ear: External ear normal.      Left Ear: External ear normal.      Nose: Nose normal.      Mouth/Throat:      Mouth: Mucous membranes are moist.      Pharynx: Oropharynx is clear. Eyes:      Extraocular Movements: Extraocular movements intact. Conjunctiva/sclera: Conjunctivae normal.      Pupils: Pupils are equal, round, and reactive to light. Cardiovascular:      Rate and Rhythm: Normal rate and regular rhythm. Pulses: Normal pulses. Heart sounds: Normal heart sounds. Pulmonary:      Effort: Pulmonary effort is normal.      Breath sounds: Normal breath sounds.    Abdominal:      General: Bowel sounds are normal.      Palpations: Abdomen is soft. Musculoskeletal:         General: Normal range of motion. Cervical back: Normal range of motion and neck supple. Skin:     General: Skin is warm and dry. Capillary Refill: Capillary refill takes less than 2 seconds. Neurological:      General: No focal deficit present. Mental Status: She is alert and oriented to person, place, and time. Psychiatric:         Mood and Affect: Mood normal.         Behavior: Behavior normal.             MEDICAL DECISION MAKING   Initial Assessment:   Patient is a 63 old female who presents to the ED with complaint of tightness of chest.  Patient had pain intermittently throughout the day and worse this p.m. Patient has a previous history cardiac stents greater than 10 years ago. Patient differential diagnosis includes but is not limited to chest pain acute, pneumonia, ACS, anxiety  and musculoskeletal pain. Patient does have an extensive cardiac history stent placement greater than 10 years ago and heart score is 5. Initial troponin is negative no acute changes to EKG noted.   Due to patient's symptomatology and increased risk patient will need further evaluation to rule out ACS    Plan:     Patient will be admitted to hospitalist for further evaluation and treatment at this time          ED RESULTS   Laboratory results:  Labs Reviewed   CBC WITH AUTO DIFFERENTIAL - Abnormal; Notable for the following components:       Result Value    RBC 3.36 (*)     Hemoglobin 9.9 (*)     Hematocrit 30.9 (*)     MCHC 32.0 (*)     RDW-CV 15.6 (*)     RDW-SD 51.9 (*)     Segs Absolute 8.0 (*)     Lymphocytes Absolute 0.9 (*)     Immature Grans (Abs) 0.17 (*)     All other components within normal limits   COMPREHENSIVE METABOLIC PANEL W/ REFLEX TO MG FOR LOW K - Abnormal; Notable for the following components:    Glucose 295 (*)     Total Bilirubin 0.2 (*)     All other components within normal limits   D-DIMER, QUANTITATIVE - Abnormal; Notable for the following components:    D-Dimer, Quant 962.00 (*)     All other components within normal limits   GLOMERULAR FILTRATION RATE, ESTIMATED - Abnormal; Notable for the following components:    Est, Glom Filt Rate 83 (*)     All other components within normal limits   POCT GLUCOSE - Abnormal; Notable for the following components:    POC Glucose 214 (*)     All other components within normal limits   POCT GLUCOSE - Abnormal; Notable for the following components:    POC Glucose 302 (*)     All other components within normal limits   POCT GLUCOSE - Normal   TROPONIN   BRAIN NATRIURETIC PEPTIDE   ANION GAP   OSMOLALITY   TROPONIN   HEMOGLOBIN A1C   TSH WITH REFLEX   LACTIC ACID       Radiologic studies results:  CTA CHEST W WO CONTRAST - r/o Pulmonary Embolism   Final Result   Impression:   1. No pulmonary emboli. 2. No thoracic aortic aneurysm or dissection   3. Bilateral lower lobe dependent atelectasis. No pneumonia   4. Tracheostomy      This document has been electronically signed by: Carlos James MD on    06/06/2022 07:57 PM      All CTs at this facility use dose modulation techniques and iterative    reconstructions, and/or weight-based dosing   when appropriate to reduce radiation to a low as reasonably achievable. XR CHEST PORTABLE   Final Result   Impression:   1.  No acute infiltrate      This document has been electronically signed by: Carlos James MD on    06/06/2022 07:19 PM          ED Medications administered this visit:   Medications   albuterol sulfate  (90 Base) MCG/ACT inhaler 2 puff (has no administration in time range)   aspirin chewable tablet 81 mg (has no administration in time range)   budesonide (PULMICORT) nebulizer suspension 500 mcg (has no administration in time range)   docusate sodium (COLACE) capsule 100 mg (100 mg Oral Not Given 6/7/22 0056)   famotidine (PEPCID) tablet 20 mg (20 mg Oral Given 6/7/22 0059)   furosemide (LASIX) tablet 40 mg (has no administration in time range)   hydrOXYzine pamoate (VISTARIL) capsule 25 mg (has no administration in time range)   melatonin tablet 6 mg (6 mg Oral Given 6/7/22 0059)   metoprolol succinate (TOPROL XL) extended release tablet 25 mg (has no administration in time range)   miconazole (MICOTIN) 2 % powder ( Topical Given 6/7/22 0059)   nitroGLYCERIN (NITROSTAT) SL tablet 0.4 mg (has no administration in time range)   rosuvastatin (CRESTOR) tablet 10 mg (has no administration in time range)   predniSONE (DELTASONE) tablet 10 mg (has no administration in time range)   insulin lispro (HUMALOG) injection vial 0-12 Units (has no administration in time range)   insulin lispro (HUMALOG) injection vial 0-6 Units (4 Units SubCUTAneous Given 6/7/22 0055)   insulin glargine (LANTUS;BASAGLAR) injection pen 10 Units (has no administration in time range)   sodium chloride flush 0.9 % injection 10 mL (10 mLs IntraVENous Given 6/7/22 0059)   sodium chloride flush 0.9 % injection 10 mL (has no administration in time range)   0.9 % sodium chloride infusion (has no administration in time range)   enoxaparin (LOVENOX) injection 40 mg (has no administration in time range)   ondansetron (ZOFRAN-ODT) disintegrating tablet 4 mg (has no administration in time range)     Or   ondansetron (ZOFRAN) injection 4 mg (has no administration in time range)   polyethylene glycol (GLYCOLAX) packet 17 g (has no administration in time range)   acetaminophen (TYLENOL) tablet 650 mg (has no administration in time range)     Or   acetaminophen (TYLENOL) suppository 650 mg (has no administration in time range)   iopamidol (ISOVUE-370) 76 % injection 80 mL (80 mLs IntraVENous Given 6/6/22 1935)         ED COURSE     ED Course as of 06/07/22 0115 Tue Jun 07, 2022   0114 D-Dimer, Quant(!): 962.00 [CN]   0115 Hemoglobin Quant(!): 9.9 [CN]   0115 Hematocrit(!): 30.9 [CN]   0115 GLUCOSE, FASTING,GF(!): 295 [CN]      ED Course User Index  [CN] Shashank Navarrete MD         CTA chest showed no acute abnormality or PE    MEDICATION CHANGES     Current Discharge Medication List            FINAL DISPOSITION     Final diagnoses:   Acute chest pain   Dyspnea and respiratory abnormalities     Condition: condition: fair  Dispo: Admit to telemetry      This transcription was electronically signed. Parts of this transcriptions may have been dictated by use of voice recognition software and electronically transcribed, and parts may have been transcribed with the assistance of an ED scribe. The transcription may contain errors not detected in proofreading. Please refer to my supervising physician's documentation if my documentation differs.     Electronically Signed: Shashank Navarrete MD, 06/07/22, 1:15 AM       Shashank Navarrete MD  Resident  06/07/22 9900

## 2022-06-07 NOTE — ED NOTES
ED to inpatient nurses report    Chief Complaint   Patient presents with    Shortness of Breath     trach mask    Chest Pain     discomfort      Present to ED from Home  LOC: alert and orientated to name, place, date  Vital signs   Vitals:    06/06/22 1836 06/06/22 2001 06/06/22 2106 06/06/22 2238   BP: 127/68 119/68 129/71 132/76   Pulse: 74 66 68 68   Resp: 23 18 18 18   Temp:       TempSrc:       SpO2: 97% 100% 97% 100%   Weight:          Oxygen Baseline Trach Mask 2L O2    Current needs required Trach Mask 2L O2 Bipap/Cpap No  LDAs:   Peripheral IV 06/06/22 Left Antecubital (Active)   Site Assessment Clean, dry & intact 06/06/22 2238   Line Status Normal saline locked 06/06/22 2238   Phlebitis Assessment No symptoms 06/06/22 2238   Infiltration Assessment 0 06/06/22 2238   Dressing Status Clean, dry & intact 06/06/22 2238   Dressing Intervention New 06/06/22 1729     Mobility: Requires assistance * 1  Pending ED orders: None  Present condition: Stable    Electronically signed by Merissa Black RN on 6/6/2022 at 11:26 PM     Merissa Black RN  06/06/22 6673

## 2022-06-08 VITALS
WEIGHT: 198.85 LBS | TEMPERATURE: 98.1 F | BODY MASS INDEX: 36.37 KG/M2 | SYSTOLIC BLOOD PRESSURE: 98 MMHG | HEART RATE: 71 BPM | OXYGEN SATURATION: 100 % | RESPIRATION RATE: 19 BRPM | DIASTOLIC BLOOD PRESSURE: 70 MMHG

## 2022-06-08 LAB
ANION GAP SERPL CALCULATED.3IONS-SCNC: 9 MEQ/L (ref 8–16)
BUN BLDV-MCNC: 18 MG/DL (ref 7–22)
CALCIUM SERPL-MCNC: 9.1 MG/DL (ref 8.5–10.5)
CHLORIDE BLD-SCNC: 103 MEQ/L (ref 98–111)
CO2: 27 MEQ/L (ref 23–33)
CREAT SERPL-MCNC: 0.8 MG/DL (ref 0.4–1.2)
ERYTHROCYTE [DISTWIDTH] IN BLOOD BY AUTOMATED COUNT: 15.7 % (ref 11.5–14.5)
ERYTHROCYTE [DISTWIDTH] IN BLOOD BY AUTOMATED COUNT: 53.8 FL (ref 35–45)
GFR SERPL CREATININE-BSD FRML MDRD: 71 ML/MIN/1.73M2
GLUCOSE BLD-MCNC: 139 MG/DL (ref 70–108)
GLUCOSE BLD-MCNC: 141 MG/DL (ref 70–108)
GLUCOSE BLD-MCNC: 352 MG/DL (ref 70–108)
HCT VFR BLD CALC: 30 % (ref 37–47)
HEMOGLOBIN: 9.3 GM/DL (ref 12–16)
MCH RBC QN AUTO: 29.2 PG (ref 26–33)
MCHC RBC AUTO-ENTMCNC: 31 GM/DL (ref 32.2–35.5)
MCV RBC AUTO: 94.3 FL (ref 81–99)
PLATELET # BLD: 198 THOU/MM3 (ref 130–400)
PMV BLD AUTO: 10.5 FL (ref 9.4–12.4)
POTASSIUM SERPL-SCNC: 4 MEQ/L (ref 3.5–5.2)
RBC # BLD: 3.18 MILL/MM3 (ref 4.2–5.4)
SODIUM BLD-SCNC: 139 MEQ/L (ref 135–145)
WBC # BLD: 10 THOU/MM3 (ref 4.8–10.8)

## 2022-06-08 PROCEDURE — G0378 HOSPITAL OBSERVATION PER HR: HCPCS

## 2022-06-08 PROCEDURE — 94640 AIRWAY INHALATION TREATMENT: CPT

## 2022-06-08 PROCEDURE — 6370000000 HC RX 637 (ALT 250 FOR IP): Performed by: STUDENT IN AN ORGANIZED HEALTH CARE EDUCATION/TRAINING PROGRAM

## 2022-06-08 PROCEDURE — 99212 OFFICE O/P EST SF 10 MIN: CPT | Performed by: NURSE PRACTITIONER

## 2022-06-08 PROCEDURE — 36415 COLL VENOUS BLD VENIPUNCTURE: CPT

## 2022-06-08 PROCEDURE — 85027 COMPLETE CBC AUTOMATED: CPT

## 2022-06-08 PROCEDURE — 82948 REAGENT STRIP/BLOOD GLUCOSE: CPT

## 2022-06-08 PROCEDURE — 2700000000 HC OXYGEN THERAPY PER DAY

## 2022-06-08 PROCEDURE — 6360000002 HC RX W HCPCS: Performed by: INTERNAL MEDICINE

## 2022-06-08 PROCEDURE — 99217 PR OBSERVATION CARE DISCHARGE MANAGEMENT: CPT | Performed by: FAMILY MEDICINE

## 2022-06-08 PROCEDURE — 96372 THER/PROPH/DIAG INJ SC/IM: CPT

## 2022-06-08 PROCEDURE — 94760 N-INVAS EAR/PLS OXIMETRY 1: CPT

## 2022-06-08 PROCEDURE — 6370000000 HC RX 637 (ALT 250 FOR IP): Performed by: INTERNAL MEDICINE

## 2022-06-08 PROCEDURE — 80048 BASIC METABOLIC PNL TOTAL CA: CPT

## 2022-06-08 RX ORDER — BUSPIRONE HYDROCHLORIDE 10 MG/1
10 TABLET ORAL 3 TIMES DAILY
Qty: 90 TABLET | Refills: 0 | Status: SHIPPED | OUTPATIENT
Start: 2022-06-08 | End: 2022-06-08 | Stop reason: HOSPADM

## 2022-06-08 RX ORDER — LISINOPRIL 10 MG/1
10 TABLET ORAL DAILY
Qty: 30 TABLET | Refills: 3 | Status: SHIPPED | OUTPATIENT
Start: 2022-06-08

## 2022-06-08 RX ADMIN — FUROSEMIDE 40 MG: 40 TABLET ORAL at 09:59

## 2022-06-08 RX ADMIN — ASPIRIN 81 MG 81 MG: 81 TABLET ORAL at 09:59

## 2022-06-08 RX ADMIN — BUDESONIDE 500 MCG: 0.5 INHALANT RESPIRATORY (INHALATION) at 08:46

## 2022-06-08 RX ADMIN — GUAIFENESIN 600 MG: 600 TABLET, EXTENDED RELEASE ORAL at 09:58

## 2022-06-08 RX ADMIN — INSULIN LISPRO 10 UNITS: 100 INJECTION, SOLUTION INTRAVENOUS; SUBCUTANEOUS at 11:44

## 2022-06-08 RX ADMIN — METOPROLOL SUCCINATE 25 MG: 25 TABLET, EXTENDED RELEASE ORAL at 09:59

## 2022-06-08 RX ADMIN — FAMOTIDINE 20 MG: 20 TABLET ORAL at 09:59

## 2022-06-08 RX ADMIN — PREDNISONE 10 MG: 10 TABLET ORAL at 09:59

## 2022-06-08 NOTE — PROGRESS NOTES
Discharge teaching and instructions for diagnosis/procedure of angina completed with patient using teachback method. AVS reviewed. Printed prescriptions given to patient. Patient voiced understanding regarding prescriptions, follow up appointments, and care of self at home.  Discharged ambulatory to  independent living per family

## 2022-06-08 NOTE — DISCHARGE SUMMARY
Hospital Medicine Discharge Summary      Patient Identification:   Jadyn Hardin   : 1950  MRN: [id number]   Account: [de-identified]      Patient's PCP: Kaykay Nolasco    Admit Date: 2022     Discharge Date:   22    Admitting Physician: Bear Juarez MD     Discharge Physician: Yg Bill,      Discharge Diagnoses: The patient was seen and examined on day of discharge and this discharge summary is in conjunction with any daily progress note from day of discharge. 1. Atypical chest pain: ACS rule out. Cardiac chest pain is less likely since chest pain is pleuritic chest pain non exertional related. ? ILEANA vs GERD. Trop (-) x 2. EKG (22) NSR, no ST elevation, no ST depression, no TWI. Hx CAD s/p stent and restent LAD. CTA chest negative intrathoracic abnormalities. Card was consulted. Lexiscan stress test was negative evidence of ischemia.     2. SOB: 2/2 post Covid Syndrome vs anxiety vs obesity hypoventilation. Hx COVID infection 2021. She was intubated and develop tracheal stenosis. Trach place and has been follow up with ENT. Plan: cont Prednisone (home med), Cont Home O2, Follow up with ENT as scheduled.     3. Chronic respiratory failure: Trach dependence. Manish Leavitt is clean without oozing, bleeding. Plan: cont follow up with ENT for surgery.     4. IDDM2: A1C 8.6 on admission. Patient has been on Steroid which can induce hyperglcyemia. Plan: cont Lantus 10 unit BID, Lispro SSI, hypoglycemia protocol     5. Hx CAD: s/p PCI of LAD . On GDMT (Toprol 25, Lisinopril) and Aspirin, Cresto daily.     6. GERD: Cont Pepcid     7. ILEANA: Start Buspar 10mg TID on 22. Buspar was stopped due to diarrhea. Patient reported she has some left over Hydroxyzine from her PCP that she just takes PRN. Recommend to follow up with PCP on discharge. 8. Obesity: BMI  36.  Consult diet, weight loss and exercise      Hospital Course:   Dat Harvey is 73years old female who presented to the hospital for chest pain, shortness of breath. 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 Patient is quite chest pain left parotid chest pain not exertional related. Denies any diaphoresis, pressure in her chest, chest pain radiation to her neck or arms. Chest pain described as intermittent with pain 6-7 out of 10.      In the ED, vital signs within normal limited with O2 sat 100% on trach mask. Troponin negative x2, EKG showed normal sinus rhythm without T wave inversion, ST elevation, ST depression. BMP within normal limited. CTA of the chest was negative finding. Patient was admitted and managed for unstable angina. Cardiology was consulted. Stress test was negative for ischemia. 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 Hospital day 2, patient is stable for discharged home with follow up with ENT as schedule. Follow up with PCP for ILEANA. Review of System  Constitutional: Negative for appetite change, chills, diaphoresis, fever and unexpected weight change. HENT: Negative for congestion, dental problem, mouth sores, nosebleed  Respiratory: Negative for choking, chest tightness, wheezing and stridor. Cardiovascular: Negative for chest pain and palpitations. Gastrointestinal: Negative for anal bleeding, diarrhea, nausea and vomiting. Genitourinary: Negative for dysuria, flank pain, hematuria and urgency. Psychiatric/Behavioral: Negative for agitation, behavioral problems, confusion, decreased concentration, dysphoric mood and hallucinations.       Exam:     Vitals:  Vitals:    06/07/22 1932 06/07/22 2329 06/08/22 0041 06/08/22 0939   BP: (!) 113/53 97/62  98/70   Pulse: 70 66  71   Resp: 20 18 20 19   Temp: 97.2 °F (36.2 °C) 97.4 °F (36.3 °C)  98.1 °F (36.7 °C)   TempSrc: Axillary Axillary  Oral   SpO2: 99% 100% 100% 100%   Weight:         Weight: Weight: 198 lb 13.7 oz (90.2 kg)     24 hour intake/output:    Intake/Output Summary (Last 24 hours) at 6/8/2022 0941  Last data filed at 6/8/2022 0844  Gross per 24 hour   Intake 5 ml   Output --   Net 5 ml       General appearance: No apparent distress, appears stated age and cooperative. HEENT: Pupils equal, round, and reactive to light. Conjunctivae/corneas clear. Neck: Supple, with full range of motion. No jugular venous distention. Trachea midline. Trach mask clean, no oozing, no bleeding. Respiratory:  Normal respiratory effort. Clear to auscultation, bilaterally without Rales/Wheezes/Rhonchi. Cardiovascular: Regular rate and rhythm with normal S1/S2 without murmurs, rubs or gallops. Abdomen: Soft, non-tender, non-distended with normal bowel sounds. Musculoskeletal: passive and active ROM x 4 extremities. Skin: Skin color, texture, turgor normal.  No rashes or lesions. Neurologic:  Neurovascularly intact without any focal sensory/motor deficits. Cranial nerves: II-XII intact, grossly non-focal.  Psychiatric: Alert and oriented, thought content appropriate, normal insight  Capillary Refill: Brisk,< 3 seconds   Peripheral Pulses: +2 palpable, equal bilaterally     Labs: For convenience and continuity at follow-up the following most recent labs are provided:      CBC:    Lab Results   Component Value Date    WBC 10.0 06/08/2022    HGB 9.3 06/08/2022    HCT 30.0 06/08/2022     06/08/2022       Renal:    Lab Results   Component Value Date     06/08/2022    K 4.0 06/08/2022    K 3.8 06/06/2022     06/08/2022    CO2 27 06/08/2022    BUN 18 06/08/2022    CREATININE 0.8 06/08/2022    CALCIUM 9.1 06/08/2022    PHOS 3.1 04/06/2022         Significant Diagnostic Studies    Radiology:   VL DUP LOWER EXTREMITY VENOUS BILATERAL   Final Result   Normal venous ultrasound. No evidence for deep venous thrombosis. **This report has been created using voice recognition software.   It may contain minor errors which are inherent in voice recognition technology. **      Final report electronically signed by Dr. Carmelo Iqbal on 6/7/2022 5:55 PM      CTA CHEST W WO CONTRAST - r/o Pulmonary Embolism   Final Result   Impression:   1. No pulmonary emboli. 2. No thoracic aortic aneurysm or dissection   3. Bilateral lower lobe dependent atelectasis. No pneumonia   4. Tracheostomy      This document has been electronically signed by: Ni Agudelo MD on    06/06/2022 07:57 PM      All CTs at this facility use dose modulation techniques and iterative    reconstructions, and/or weight-based dosing   when appropriate to reduce radiation to a low as reasonably achievable. XR CHEST PORTABLE   Final Result   Impression:   1. No acute infiltrate      This document has been electronically signed by: Ni Agudelo MD on    06/06/2022 07:19 PM             Consults:     IP CONSULT TO CARDIOLOGY  IP CONSULT TO OTOLARYNGOLOGY    Disposition: Home  Condition at Discharge: Stable    Code Status:  Full Code     Patient Instructions: Activity: activity as tolerated  Diet: ADULT DIET; Regular; 4 carb choices (60 gm/meal); Low Sodium (2 gm)      Follow-up visits:   Rob Sue MD  [address] [facility] [address] [phone]      Doctor out of office until after the 27, Please call and schedule appt then         Discharge Medications:        Medication List      CHANGE how you take these medications     lisinopril 10 MG tablet; Commonly known as: PRINIVIL;ZESTRIL; Take 1   tablet by mouth daily; What changed: how much to take     CONTINUE taking these medications     albuterol sulfate  (90 Base) MCG/ACT inhaler; Commonly known as:   PROVENTIL;VENTOLIN;PROAIR;  Inhale 2 puffs into the lungs every 6 hours as   needed for Wheezing   aspirin 81 MG chewable tablet   budesonide 0.5 MG/2ML nebulizer suspension; Commonly known as:   PULMICORT; Take 2 mLs by nebulization 2 times daily   diclofenac sodium 1 % Gel; Commonly known as: VOLTAREN; Apply 2 g   topically 4 times daily as needed for Pain   famotidine 20 MG tablet; Commonly known as: PEPCID; Take 1 tablet by   mouth 2 times daily   furosemide 40 MG tablet; Commonly known as: LASIX; Take 1 tablet by   mouth daily   guaiFENesin 400 MG tablet   hydrOXYzine pamoate 25 MG capsule; Commonly known as: VISTARIL; Take 1   capsule by mouth 4 times daily as needed for Anxiety   insulin  UNIT/ML injection vial; Commonly known as: HUMULIN   N;NOVOLIN N   Lantus SoloStar 100 UNIT/ML injection pen; Generic drug: insulin   glargine   melatonin 3 mg Tabs tablet; Take 2 tablets by mouth nightly   metoprolol succinate 25 MG extended release tablet; Commonly known as:   TOPROL XL; Take 1 tablet by mouth daily   nitroGLYCERIN 0.4 MG SL tablet; Commonly known as: NITROSTAT   OXYGEN; Inhale 2 L/min into the lungs continuous prn (during activities   such as walking)   polycarbophil 625 MG tablet; Commonly known as: FIBERCON; Take 1 tablet   by mouth daily   predniSONE 10 MG tablet; Commonly known as: DELTASONE; Take 1 (one)   tablet by mouth once daily.  rosuvastatin 10 MG tablet; Commonly known as: CRESTOR     STOP taking these medications     docusate sodium 100 mg capsule; Commonly known as: COLACE   miconazole 2 % powder; Commonly known as: MICOTIN        Time Spent on discharge is more than 45 minutes in the examination, evaluation, counseling and review of medications and discharge plan. Signed: Thank you Rashard Blanchard for the opportunity to be involved in this patient's care.     Electronically signed by Mallory Cam DO on 6/8/2022 at 9:41 AM

## 2022-06-08 NOTE — PROGRESS NOTES
Brief note:  Derrel Becky stress performed 6/7/22 and results reflect negative findings for indication of ischemia. No additional work-up at this time. Please call with questions or concerns.

## 2022-06-08 NOTE — PLAN OF CARE
Problem: Respiratory - Adult  Goal: Patient's breath sounds will be clear and equal  Outcome: Progressing

## 2022-06-10 PROBLEM — R06.00 DYSPNEA AND RESPIRATORY ABNORMALITIES: Status: ACTIVE | Noted: 2022-03-27

## 2022-06-10 PROBLEM — R06.89 DYSPNEA AND RESPIRATORY ABNORMALITIES: Status: ACTIVE | Noted: 2022-03-27

## 2022-06-13 NOTE — TELEPHONE ENCOUNTER
Patient's , Nirmal Perry called in again this morning stating [employer] needs an order for the patient's trach supplies. He states [employer] states they do not have an order. Dr Mahnaz Sweeney,  Please confirm what equipment, size and type of trach supplies patient needs so we can call or fax an order to [address] [place] [address] to get patient what she needs.

## 2022-06-14 PROBLEM — J95.09 OTHER TRACHEOSTOMY COMPLICATION (HCC): Status: ACTIVE | Noted: 2022-06-14

## 2022-06-14 PROBLEM — J95.03 TRACHEAL STENOSIS DUE TO TRACHEOSTOMY (HCC): Status: ACTIVE | Noted: 2022-06-14

## 2022-06-14 NOTE — ADDENDUM NOTE
Addended by: Carline Fisher on: 6/14/2022 04:13 PM     Modules accepted: Orders no abdominal pain, no bloating, no constipation, no diarrhea, no nausea and no vomiting.

## 2022-06-14 NOTE — TELEPHONE ENCOUNTER
I contacted the patient and her  by phone to discuss her specific needs. They both want the \"old style\" #6 cuffless Shiley without a fenestration and reusable inner cannula's. The confusion was that they thought this was the \"new style\" but it is not. I corrected that misimpression. We will get the new order in. I also discussed with the patient and her  the neck steps in her care. They were expecting to get a call regarding the upcoming planned surgery. She is scheduled to see me in a couple of weeks. I will get an order for the planned procedure which is for age transoral laryngoplasty and probable partial arytenoidectomy with lateralization of the left cord as well as tracheal stenosis work. They reported being good with the plan and understand that she will be getting downsized after that occurs if all goes well as a part of the process of getting her decannulated. They accepted this possibility and wished to proceed as recommended. Caleb Mendoza.  Arnoldo Kiran MD

## 2022-06-21 ENCOUNTER — TELEPHONE (OUTPATIENT)
Dept: ENT CLINIC | Age: 72
End: 2022-06-21

## 2022-06-21 NOTE — TELEPHONE ENCOUNTER
Bailey Mckinnon called back and stated they he looked at the what they had on hand and he stated they did not have any of the Shiley size 6 non fenestrated uncuffed trach at home. I called [employer] and they received the order however they are unsure if they still make the (old Version) so they are going to look if they do not they will call. If they have them on hand they will call Bailey Mckinnon to set up delivery.

## 2022-06-28 ENCOUNTER — TELEPHONE (OUTPATIENT)
Dept: ENT CLINIC | Age: 72
End: 2022-06-28

## 2022-06-28 NOTE — TELEPHONE ENCOUNTER
Patient  called she has one pill left for tomorrow on prednisone. He is wanting to know if she is still needing to take this if so she will need refills.

## 2022-06-28 NOTE — TELEPHONE ENCOUNTER
Dr. Miroslava Crenshaw would like to see the patient tomorrow during her office visit and make a determination if further steroids are needed.   Discussed this with Dr. Gillian Wheeler during the visit

## 2022-06-29 ENCOUNTER — OFFICE VISIT (OUTPATIENT)
Dept: ENT CLINIC | Age: 72
End: 2022-06-29
Payer: MEDICARE

## 2022-06-29 ENCOUNTER — TELEPHONE (OUTPATIENT)
Dept: CARDIOLOGY CLINIC | Age: 72
End: 2022-06-29

## 2022-06-29 VITALS
HEART RATE: 83 BPM | OXYGEN SATURATION: 94 % | SYSTOLIC BLOOD PRESSURE: 122 MMHG | BODY MASS INDEX: 36.21 KG/M2 | WEIGHT: 198 LBS | TEMPERATURE: 97.1 F | RESPIRATION RATE: 16 BRPM | DIASTOLIC BLOOD PRESSURE: 78 MMHG

## 2022-06-29 DIAGNOSIS — J95.03 TRACHEAL STENOSIS DUE TO TRACHEOSTOMY (HCC): ICD-10-CM

## 2022-06-29 DIAGNOSIS — R06.89 DYSPNEA AND RESPIRATORY ABNORMALITIES: ICD-10-CM

## 2022-06-29 DIAGNOSIS — Z93.0 TRACHEOSTOMY DEPENDENCE (HCC): ICD-10-CM

## 2022-06-29 DIAGNOSIS — R49.0 HOARSENESS: ICD-10-CM

## 2022-06-29 DIAGNOSIS — J38.6 POSTERIOR GLOTTIC STENOSIS: Primary | ICD-10-CM

## 2022-06-29 DIAGNOSIS — J95.09 OTHER TRACHEOSTOMY COMPLICATION (HCC): ICD-10-CM

## 2022-06-29 DIAGNOSIS — J38.00 VOCAL CORD PARALYSIS: ICD-10-CM

## 2022-06-29 DIAGNOSIS — R06.00 DYSPNEA AND RESPIRATORY ABNORMALITIES: ICD-10-CM

## 2022-06-29 PROCEDURE — 1036F TOBACCO NON-USER: CPT | Performed by: OTOLARYNGOLOGY

## 2022-06-29 PROCEDURE — G8417 CALC BMI ABV UP PARAM F/U: HCPCS | Performed by: OTOLARYNGOLOGY

## 2022-06-29 PROCEDURE — 31575 DIAGNOSTIC LARYNGOSCOPY: CPT | Performed by: OTOLARYNGOLOGY

## 2022-06-29 PROCEDURE — 31615 TRCHEOBRNCHSC EST TRACHS INC: CPT | Performed by: OTOLARYNGOLOGY

## 2022-06-29 PROCEDURE — 1123F ACP DISCUSS/DSCN MKR DOCD: CPT | Performed by: OTOLARYNGOLOGY

## 2022-06-29 PROCEDURE — 99215 OFFICE O/P EST HI 40 MIN: CPT | Performed by: OTOLARYNGOLOGY

## 2022-06-29 PROCEDURE — 3017F COLORECTAL CA SCREEN DOC REV: CPT | Performed by: OTOLARYNGOLOGY

## 2022-06-29 PROCEDURE — 1090F PRES/ABSN URINE INCON ASSESS: CPT | Performed by: OTOLARYNGOLOGY

## 2022-06-29 PROCEDURE — G8400 PT W/DXA NO RESULTS DOC: HCPCS | Performed by: OTOLARYNGOLOGY

## 2022-06-29 PROCEDURE — G8427 DOCREV CUR MEDS BY ELIG CLIN: HCPCS | Performed by: OTOLARYNGOLOGY

## 2022-06-29 RX ORDER — PREDNISONE 2.5 MG
5 TABLET ORAL DAILY
Qty: 21 TABLET | Refills: 0 | Status: SHIPPED | OUTPATIENT
Start: 2022-06-29 | End: 2022-07-06

## 2022-06-29 RX ORDER — BUDESONIDE 0.25 MG/2ML
250 INHALANT ORAL 2 TIMES DAILY
Qty: 60 EACH | Refills: 3 | Status: SHIPPED | OUTPATIENT
Start: 2022-06-29 | End: 2022-07-12

## 2022-06-29 NOTE — TELEPHONE ENCOUNTER
José Joseph is scheduled for surgery with Dr Mariela Rock on 7/20/22. She mentions she saw Dr Bety Corona in the hospital earlier this month and that he was aware she was being scheduled for surgery. She had some testing done also. Please advise on Cardiac Clearance. Surgery: transoral laryngoplasty with left partial arytenoidectomy and possible lateralization and advancement flap reconstruction. Please advise. Thank you!

## 2022-06-29 NOTE — PROGRESS NOTES
[facility] [place] [facility]  [facility]  [facility] [place]pt: [phone]  Dept Fax: [phone]  Loc: [phone]    Kathleen Garcia is a 73 y.o. female who was referred by No ref. provider found for:  Chief Complaint   Patient presents with    Follow-up     pt is here for 5-6 f/u.wants to talk about RX       HPI:     Kathleen Garcia is a 73 y.o. female with a history of prolonged intubation and posterior glottic webbing generating airway obstruction and a need for tracheostomy. The patient has now had a tracheostomy for the past several months and has been gradually developing subglottic and peristomal stenosis that has prevented her from even tolerating her Passy-Janeth speaking valve. There is also been a substantial amount of confusion as to what kind of tracheostomy cannula she has and whether or not she can use reusable versus single use inner cannula's. This has been to the patient's and her 's consternation. I hope to be able to address these issues with the patient today as well as the plan whether or not she is ready for the next stage of surgery and plan for that operation if indicated. The patient now has significant oxygen dependency and brings an oxygen tank with her for her visit. History:      Allergies   Allergen Reactions    Metformin And Related Other (See Comments)     diarrhea    Codeine Nausea And Vomiting    Meperidine Hcl Nausea And Vomiting    Sulfa Antibiotics Nausea And Vomiting    Sumatriptan Nausea And Vomiting     Current Outpatient Medications   Medication Sig Dispense Refill    predniSONE (DELTASONE) 2.5 MG tablet Take 2 tablets by mouth daily for 7 days Then take 1 per day for another week and STOP 21 tablet 0    budesonide (PULMICORT) 0.25 MG/2ML nebulizer suspension Take 2 mLs by nebulization 2 times daily 60 each 3    lisinopril (PRINIVIL;ZESTRIL) 10 MG tablet Take 1 tablet by mouth daily 30 tablet 3    guaiFENesin 400 MG tablet Take 400 mg by mouth 2 times daily as needed for Cough      LANTUS SOLOSTAR 100 UNIT/ML injection pen 20 units in PM      insulin NPH (HUMULIN N;NOVOLIN N) 100 UNIT/ML injection vial Inject into the skin 2 times daily (before meals) Takes BID on the SS      metoprolol succinate (TOPROL XL) 25 MG extended release tablet Take 1 tablet by mouth daily 30 tablet 3    hydrOXYzine (VISTARIL) 25 MG capsule Take 1 capsule by mouth 4 times daily as needed for Anxiety 60 capsule 1    albuterol sulfate  (90 Base) MCG/ACT inhaler Inhale 2 puffs into the lungs every 6 hours as needed for Wheezing 18 g 3    diclofenac sodium (VOLTAREN) 1 % GEL Apply 2 g topically 4 times daily as needed for Pain 150 g 3    furosemide (LASIX) 40 MG tablet Take 1 tablet by mouth daily 60 tablet 3    polycarbophil (FIBERCON) 625 MG tablet Take 1 tablet by mouth daily  4    melatonin 3 MG TABS tablet Take 2 tablets by mouth nightly 60 tablet 3    famotidine (PEPCID) 20 MG tablet Take 1 tablet by mouth 2 times daily 60 tablet 3    OXYGEN Inhale 2 L/min into the lungs continuous prn (during activities such as walking) 1 Canister 5    rosuvastatin (CRESTOR) 10 MG tablet Take 10 mg by mouth daily      nitroGLYCERIN (NITROSTAT) 0.4 MG SL tablet Place 0.4 mg under the tongue every 5 minutes as needed for Chest pain up to max of 3 total doses. If no relief after 1 dose, call 911.  aspirin 81 MG chewable tablet Take 81 mg by mouth daily       No current facility-administered medications for this visit.      Past Medical History:   Diagnosis Date    Arthritis     Coronary artery disease     COVID     Hyperlipidemia     Primary hypertension     Type 2 diabetes mellitus ([facility] [address] 2018      Past Surgical History:   Procedure Laterality Date    CARDIAC SURGERY      CATARACT REMOVAL Bilateral      SECTION      3 times    CORONARY ANGIOPLASTY WITH STENT PLACEMENT   stenting twice    EYE SURGERY      HIATAL HERNIA REPAIR      late 1990s    HYSTERECTOMY, TOTAL ABDOMINAL (CERVIX REMOVED)      in 1990s    LARYNGOSCOPY N/A 3/22/2022    SUSPENSON LARYNGOSCOPY WITH JET VENT, DILATION performed by Angela Mari MD at [address] N/A 3/28/2022    SUSPENSION MICROLARYNGOSCOPY WITH BALLOON DILATION AND JET VENT, KENALOG INJECTION performed by Angela Mari MD at [address] N/A 4/7/2022    Suspension Laryngoscopy  Lateral of Right True Vocal Cord, With Steroid Injection of Larynx And Tracheostomy Tube Change, debridement performed by Kacey Long MD at [address] [facility]      in [address] [facility] 4/1/2022    TRACHEOTOMY TUBE REPLACEMENT performed by Angela Mari MD at [address] History   Problem Relation Age of Onset    Parkinson's Disease Father     Lung Cancer Father      Social History     Tobacco Use    Smoking status: Never Smoker    Smokeless tobacco: Never Used   Substance Use Topics    Alcohol use: Not Currently     Comment: drinks 1 glass of wine cooler or wine about 3 times per year        Subjective:      Review of Systems  Rest of review of systems are negative, except as noted in HPI. Objective:     /78 (Site: Right Upper Arm, Position: Sitting)   Pulse 83   Temp 97.1 °F (36.2 °C)   Resp 16   Wt 198 lb (89.8 kg)   LMP  (LMP Unknown)   SpO2 94%   BMI 36.21 kg/m²     Physical Exam       On general physical exam the patient is a pleasant alert and cooperative somewhat ill-appearing older adult female in no acute distress. She was capable of finger occlusion of her tracheostomy to speak but was not using Passy-Janeth valve. On occluding her tracheostomy cannula she was capable of small amount of air influx through her mouth and nose but not for any prolonged interval of time. Her voice was raspy. She coughed and cleared her airway repeatedly.   The tracheostomy was in good position and had a modest amount of mucoid staining and no foul odor. No signs of recent bleeding could be seen about the base of the tracheostomy itself. Procedure: Flexible laryngoscopy  Indication: The patient has a history of posterior glottic web with bilateral cricoarytenoid joint fixation needs to be evaluated for the possibility of a follow-on procedure to widen her posterior glottic aperture with hopes of eventual decannulation  Findings: The patient's larynx was well visualized and her posterior glottic aperture was very narrow to less than 20% of normal.  The right side appeared to be more mobile than the left this was difficult to establish with certainty because of the movement of much of the hypopharynx when the patient had an effort at phonation. No signs of recent bleeding were seen. The patient's endolarynx was difficult to anesthetize despite repeated efforts at doing so. Her anterior commissure appeared sharp. There was modest pooling in the piriform sinuses. Procedure: Transabdominal bronchoscopy:   Indication: Patient has worsening tracheostomy dependence issues particularly in trying to tolerate a speaking valve indicating worsening peristomal tracheal stenosis  Findings: After anesthetizing her trachea and stoma with atomized 4% lidocaine, I was able to remove the cannula and pass a videobronchoscope through her stoma examining the peristomal trachea, the distal trachea, and the mainstem bronchi. I found the infra stomal trachea to be remarkably normal in appearance and aperture. She had no signs of recent bleeding. There was no impending mucous plug in either mainstem. The suprastomal and peristomal trachea however was markedly narrowed by hypertrophic mucosa and granuloma-like tissue. She had a very deep suprastomal shelf preventing my view of her glottis from below. I replaced the cannula without difficulty or vital sign instability.   The patient tolerated the procedure well.    Flexible laryngoscopy and transabdominal bronchoscopy images: The patient's current cannula    The patient's glottic aperture on attempting to inhale    The patient's glottic aperture and coughing with forceful exhalation    Same    The patient's tracheostomy which is fenestrated being removed    The patient's stoma with a deep suprastomal shelf    The patient's peristomal trachea with obstructing soft tissues          Vitals reviewed. CTA CHEST W WO CONTRAST - r/o Pulmonary Embolism    Result Date: 6/6/2022  Impression: 1. No pulmonary emboli. 2. No thoracic aortic aneurysm or dissection 3. Bilateral lower lobe dependent atelectasis. No pneumonia 4. Tracheostomy This document has been electronically signed by: Dianne Prater MD on 06/06/2022 07:57 PM All CTs at this facility use dose modulation techniques and iterative reconstructions, and/or weight-based dosing when appropriate to reduce radiation to a low as reasonably achievable. XR CHEST PORTABLE    Result Date: 6/6/2022  Impression: 1. No acute infiltrate This document has been electronically signed by: Dianne Prater MD on 06/06/2022 07:19 PM    VL DUP LOWER EXTREMITY VENOUS BILATERAL    Result Date: 6/7/2022  Normal venous ultrasound. No evidence for deep venous thrombosis. **This report has been created using voice recognition software. It may contain minor errors which are inherent in voice recognition technology. ** Final report electronically signed by Dr. Pushpa Crisostomo on 6/7/2022 5:55 PM     Lab Results   Component Value Date     06/08/2022     06/07/2022     06/06/2022    K 4.0 06/08/2022    K 3.1 06/07/2022    K 3.8 06/06/2022    K 5.2 04/13/2022    K 5.5 04/08/2022     06/08/2022     06/07/2022     06/06/2022    CO2 27 06/08/2022    CO2 22 06/07/2022    CO2 26 06/06/2022    BUN 18 06/08/2022    BUN 15 06/07/2022    BUN 19 06/06/2022    CREATININE 0.8 06/08/2022    CREATININE 0.6 06/07/2022 CREATININE 0.7 06/06/2022    CALCIUM 9.1 06/08/2022    CALCIUM 9.1 06/07/2022    CALCIUM 8.6 06/06/2022    PROT 6.1 06/06/2022    PROT 7.6 03/18/2022    PROT 4.9 01/15/2022    LABALBU 3.9 06/06/2022    LABALBU 4.3 03/18/2022    LABALBU 3.3 01/15/2022    BILITOT 0.2 06/06/2022    BILITOT 0.2 03/18/2022    BILITOT 0.5 01/15/2022    ALKPHOS 66 06/06/2022    ALKPHOS 101 03/18/2022    ALKPHOS 78 01/15/2022    AST 17 06/06/2022    AST 16 03/18/2022    AST 30 01/15/2022    ALT 15 06/06/2022    ALT 9 03/18/2022    ALT 36 01/15/2022       All of the past medical history, past surgical history, family history,social history, allergies and current medications were reviewed with the patient. Assessment & Plan   Diagnoses and all orders for this visit:     Diagnosis Orders   1. Posterior glottic stenosis     2. Tracheostomy dependence ([address]     3. Hoarseness     4. Dyspnea and respiratory abnormalities     5. Other tracheostomy complication ([address]     6. 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 Vocal cord paralysis         Based on the patient's history and physical findings as well as her flexible laryngoscopy and transabdominal bronchoscopy, the patient has a confluence of problems that include her glottis and her peristomal trachea. Based on my findings today I have confirmed that the patient is an appropriate candidate for a neck stage of surgery with the hopes of improving her glottic aperture. In the upcoming operation my intention is to perform a transoral laryngoplasty with a partial left arytenoidectomy if that side is found to be less mobile than the right on physical exam at the time of surgery. Otherwise I will lateralized the right side. Regardless, the lateralization will be temporary as I will use a slowly reabsorbing suture for the process. Once I can get her glottic aperture to be wide enough then I will work on getting the tracheostomy out.   The tracheostomy will also be treated at the time of this next operation by removing obstructing soft tissues so as to make it easier for her to tolerate plugging in her recovery. This may enable her to be downsized from a 6 trach to a for trach but that remains to be seen. Right now we have established that she has a #6 cuffless Shiley with disposable inner cannula's and over 100 inner cannulas to swap out that she was not wanting to use for reasons that had only to do with the fact that she like reusing the cannula's rather than swapping them out. Coming to that understanding was surprisingly difficult. Nonetheless it is the reality and I am confident of it now. I reviewed with her the indications benefits limitations and risks of proceeding in this manner to her and her 's satisfaction. We will proceed as recommended. She is also going to be tapering off of her prednisone in the near future by going down to 5 mg/day for a week starting  tomorrow  And then down to 2.5 mg/week starting 8 days from now, for 1 week. She is then to stop taking the prednisone and knows that she will have a very low experience for several days and needs to add some salt to her diet during that period of time. I have refilled her budesonide as well. I spent over an hour of face-to-face time with the patient and her  more than half of which was dedicated to reviewing her very complex history and planning this surgical program.    Return in about 4 weeks (around 7/27/2022) for Postop follow-up and endoscopy if indicated. **This report has been created using voice recognition software. It may contain minor errors which are inherent in voice recognition technology. **

## 2022-07-07 ENCOUNTER — OFFICE VISIT (OUTPATIENT)
Dept: CARDIOLOGY CLINIC | Age: 72
End: 2022-07-07
Payer: MEDICARE

## 2022-07-07 VITALS
WEIGHT: 196.2 LBS | SYSTOLIC BLOOD PRESSURE: 136 MMHG | HEIGHT: 62 IN | HEART RATE: 64 BPM | BODY MASS INDEX: 36.1 KG/M2 | DIASTOLIC BLOOD PRESSURE: 76 MMHG

## 2022-07-07 DIAGNOSIS — Z01.810 PREOP CARDIOVASCULAR EXAM: Primary | ICD-10-CM

## 2022-07-07 PROCEDURE — 1090F PRES/ABSN URINE INCON ASSESS: CPT | Performed by: INTERNAL MEDICINE

## 2022-07-07 PROCEDURE — G8400 PT W/DXA NO RESULTS DOC: HCPCS | Performed by: INTERNAL MEDICINE

## 2022-07-07 PROCEDURE — G8427 DOCREV CUR MEDS BY ELIG CLIN: HCPCS | Performed by: INTERNAL MEDICINE

## 2022-07-07 PROCEDURE — 1036F TOBACCO NON-USER: CPT | Performed by: INTERNAL MEDICINE

## 2022-07-07 PROCEDURE — G8417 CALC BMI ABV UP PARAM F/U: HCPCS | Performed by: INTERNAL MEDICINE

## 2022-07-07 PROCEDURE — 3017F COLORECTAL CA SCREEN DOC REV: CPT | Performed by: INTERNAL MEDICINE

## 2022-07-07 PROCEDURE — 1123F ACP DISCUSS/DSCN MKR DOCD: CPT | Performed by: INTERNAL MEDICINE

## 2022-07-07 PROCEDURE — 99213 OFFICE O/P EST LOW 20 MIN: CPT | Performed by: INTERNAL MEDICINE

## 2022-07-07 RX ORDER — GUAIFENESIN AND PSEUDOEPHEDRINE HCL 1200; 120 MG/1; MG/1
TABLET, EXTENDED RELEASE ORAL 2 TIMES DAILY
Status: ON HOLD | COMMUNITY
End: 2022-08-13 | Stop reason: HOSPADM

## 2022-07-07 NOTE — PROGRESS NOTES
[facility] [address]  [place] [address]  Dept: [phone]  Dept Fax: [phone]  Loc: [phone]    Visit Date: 7/7/2022    Ms. Stanford Hernandez is a 73 y.o. female  who presented for:  Chief Complaint   Patient presents with    [facility] [address]       HPI:   74 yo CAD s/p PCI, COVID1-9 in 12/2021, s/p tracheostomy and respiratory failure, DM, HTN, HLD is here for preop risk stratification for laryngeal surgery. Used to follow up with cardiologist at [facility]. She was recently in hospital underwent Echo and stress, both were within normal limits.         Current Outpatient Medications:     pseudoephedrine-guaiFENesin (MUCINEX D MAX STRENGTH) 120-1200 MG TB12, Take by mouth in the morning and at bedtime, Disp: , Rfl:     Cholecalciferol (VITAMIN D3 PO), Take by mouth daily, Disp: , Rfl:     NONFORMULARY, daily resveratrol, Disp: , Rfl:     budesonide (PULMICORT) 0.25 MG/2ML nebulizer suspension, Take 2 mLs by nebulization 2 times daily, Disp: 60 each, Rfl: 3    lisinopril (PRINIVIL;ZESTRIL) 10 MG tablet, Take 1 tablet by mouth daily, Disp: 30 tablet, Rfl: 3    guaiFENesin 400 MG tablet, Take 400 mg by mouth 2 times daily as needed for Cough, Disp: , Rfl:     LANTUS SOLOSTAR 100 UNIT/ML injection pen, 20 units in PM, Disp: , Rfl:     insulin NPH (HUMULIN N;NOVOLIN N) 100 UNIT/ML injection vial, Inject into the skin 2 times daily (before meals) Takes BID on the SS, Disp: , Rfl:     metoprolol succinate (TOPROL XL) 25 MG extended release tablet, Take 1 tablet by mouth daily, Disp: 30 tablet, Rfl: 3    hydrOXYzine (VISTARIL) 25 MG capsule, Take 1 capsule by mouth 4 times daily as needed for Anxiety, Disp: 60 capsule, Rfl: 1    albuterol sulfate  (90 Base) MCG/ACT inhaler, Inhale 2 puffs into the lungs every 6 hours as needed for Wheezing, Disp: 18 g, Rfl: 3    diclofenac sodium (VOLTAREN) 1 % GEL, Apply 2 g topically 4 times daily as needed for Pain, Disp: 150 g, Rfl: 3    furosemide (LASIX) 40 MG tablet, Take 1 tablet by mouth daily, Disp: 60 tablet, Rfl: 3    famotidine (PEPCID) 20 MG tablet, Take 1 tablet by mouth 2 times daily (Patient taking differently: Take 20 mg by mouth 2 times daily as needed ), Disp: 60 tablet, Rfl: 3    OXYGEN, Inhale 2 L/min into the lungs continuous prn (during activities such as walking), Disp: 1 Canister, Rfl: 5    rosuvastatin (CRESTOR) 10 MG tablet, Take 10 mg by mouth daily, Disp: , Rfl:     nitroGLYCERIN (NITROSTAT) 0.4 MG SL tablet, Place 0.4 mg under the tongue every 5 minutes as needed for Chest pain up to max of 3 total doses. If no relief after 1 dose, call 911. , Disp: , Rfl:     aspirin 81 MG chewable tablet, Take 81 mg by mouth daily, Disp: , Rfl:     Past Medical History  Toñito Massey  has a past medical history of Arthritis, Coronary artery disease, COVID, Hyperlipidemia, Primary hypertension, and Type 2 diabetes mellitus ([facility] [address] Social History  Toñito Massey  reports that she has never smoked. She has never used smokeless tobacco. She reports previous alcohol use. She reports that she does not use drugs. Family History  Toñito Massey family history includes Lung Cancer in her father; Parkinson's Disease in her father.     Past Surgical History   Past Surgical History:   Procedure Laterality Date    CARDIAC SURGERY      CATARACT REMOVAL Bilateral      SECTION      3 times    CORONARY ANGIOPLASTY WITH STENT PLACEMENT      stenting twice    EYE SURGERY      HIATAL HERNIA REPAIR      late     HYSTERECTOMY, TOTAL ABDOMINAL (CERVIX REMOVED)      in     LARYNGOSCOPY N/A 3/22/2022    SUSPENSON LARYNGOSCOPY WITH JET VENT, DILATION performed by Reema Gooden MD at [address] [place] [address] 3/28/2022    SUSPENSION MICROLARYNGOSCOPY WITH BALLOON DILATION AND JET VENT, KENALOG INJECTION performed by Reema Gooden MD at [address] [place] [address] 4/7/2022    Suspension Laryngoscopy  Lateral of Right True Vocal Cord, With Steroid Injection of Larynx And Tracheostomy Tube Change, debridement performed by Jess Tiwari MD at [address] [facility]      in [address] [place] [address],[address] Floor 4/1/2022    TRACHEOTOMY TUBE REPLACEMENT performed by Julissa Urena MD at [address] [place] [address] Place:     REVIEW OF SYSTEMS  Constitutional: denies sweats, chills and fever  HENT: denies  congestion, sinus pressure, sneezing and sore throat. Eyes: denies  pain, discharge, redness and itching. Respiratory: denies apnea, cough  Gastrointestinal: denies blood in stool, constipation, diarrhea   Endocrine: denies cold intolerance, heat intolerance, polydipsia. Genitourinary: denies dysuria, enuresis, flank pain and hematuria. Musculoskeletal: denies arthralgias, joint swelling and neck pain. Neurological: denies numbness and headaches. Psychiatric/Behavioral: denies agitation, confusion, decreased concentration and dysphoric mood    All others reviewed and are negative. Objective:     /76   Pulse 64   Ht 5' 2\" (1.575 m)   Wt 196 lb 3.2 oz (89 kg)   LMP  (LMP Unknown)   BMI 35.89 kg/m²     Wt Readings from Last 3 Encounters:   07/07/22 196 lb 3.2 oz (89 kg)   06/29/22 198 lb (89.8 kg)   06/07/22 198 lb 13.7 oz (90.2 kg)     BP Readings from Last 3 Encounters:   07/07/22 136/76   06/29/22 122/78   06/08/22 98/70       PHYSICAL EXAM  Constitutional:with tracheostomy  HENT:   Head: Normocephalic and atraumatic. Eyes: EOM are normal. Pupils are equal, round, and reactive to light. Neck: Normal range of motion. Neck supple. No JVD present. Cardiovascular: Normal rate , normal heart sounds and intact distal pulses. Pulmonary/Chest: Effort normal and breath sounds normal. No respiratory distress. No wheezes. No rales. Abdominal: Soft. Bowel sounds are normal. No distension. There is no tenderness. Musculoskeletal: Normal range of motion.  No edema. Neurological: Alert and oriented to person, place, and time. No cranial nerve deficit. Coordination normal.   Skin: Skin is warm and dry. Psychiatric: Normal mood and affect.        No results found for: CKTOTAL, CKMB, CKMBINDEX    Lab Results   Component Value Date/Time    WBC 10.0 06/08/2022 04:11 AM    RBC 3.18 06/08/2022 04:11 AM    HGB 9.3 06/08/2022 04:11 AM    HCT 30.0 06/08/2022 04:11 AM    MCV 94.3 06/08/2022 04:11 AM    MCH 29.2 06/08/2022 04:11 AM    MCHC 31.0 06/08/2022 04:11 AM     06/08/2022 04:11 AM    MPV 10.5 06/08/2022 04:11 AM       Lab Results   Component Value Date/Time     06/08/2022 04:11 AM    K 4.0 06/08/2022 04:11 AM    K 3.8 06/06/2022 05:20 PM     06/08/2022 04:11 AM    CO2 27 06/08/2022 04:11 AM    BUN 18 06/08/2022 04:11 AM    LABALBU 3.9 06/06/2022 05:20 PM    CREATININE 0.8 06/08/2022 04:11 AM    CALCIUM 9.1 06/08/2022 04:11 AM    LABGLOM 71 06/08/2022 04:11 AM    GLUCOSE 139 06/08/2022 04:11 AM       Lab Results   Component Value Date/Time    ALKPHOS 66 06/06/2022 05:20 PM    ALT 15 06/06/2022 05:20 PM    AST 17 06/06/2022 05:20 PM    PROT 6.1 06/06/2022 05:20 PM    BILITOT 0.2 06/06/2022 05:20 PM    BILIDIR <0.2 03/18/2022 07:22 PM    LABALBU 3.9 06/06/2022 05:20 PM       Lab Results   Component Value Date/Time    MG 2.0 04/06/2022 05:00 AM       No results found for: INR, PROTIME      Lab Results   Component Value Date/Time    LABA1C 8.6 06/07/2022 06:32 AM       No results found for: TRIG, HDL, LDLCALC, LDLDIRECT, LABVLDL    Lab Results   Component Value Date/Time    TSH 3.100 06/07/2022 06:32 AM         Testing Reviewed:      I haveindividually reviewed the below cardiac tests    EKG:    ECHO: Results for orders placed during the hospital encounter of 12/18/21    ECHO Complete 2D W Doppler W Color    Narrative  Transthoracic Echocardiography Report (TTE)    Demographics    Patient Name   Shaneka Maradiaga Gender              Female    MR # [id number]     Race                    Ethnicity    Account #      [de-identified]     Room Number         0007    Accession      [id number]    Date of Study       12/21/2021  Number    Date of Birth  1950    Referring Physician Shani Smith CNP  Jacky Barrett    Age            73 year(s)    [birth year] [address] [facility], [address] [place] [address]    Interpreting        Echo reader of the week  Physician           Nikita Rosa MD    Procedure    Type of Study    TTE procedure:ECHOCARDIOGRAM COMPLETE 2D W DOPPLER W COLOR. Procedure Date  Date: 12/21/2021 Start: 03:40 PM    Study Location: Bedside  Technical Quality: Limited visualization due to patient on ventilator. Indications:Hypoxia. Additional Medical History:Diabetes, Coronary artery disease, Hypertension,  Hypoxia, Respiratory failure    Patient Status: Routine    Height: 62 inches Weight: 206 pounds BSA: 1.94 m^2 BMI: 37.68 kg/m^2    BP: 105/71 mmHg    Conclusions    Summary  Technically difficult study due to poor acoustic windows. Left ventricle size is normal.  Normal left ventricular wall thickness. There was severe global hypokinesis of the left ventricle. Systolic function was severely reduced. Ejection fraction is visually estimated in the range of 30% to 35%. Signature    ----------------------------------------------------------------  Electronically signed by Nikita Rosa MD (Interpreting  physician) on 12/21/2021 at 07:49 PM  ----------------------------------------------------------------    Findings    Mitral Valve  The mitral valve structure was normal with normal leaflet separation. DOPPLER: The transmitral velocity was within the normal range with no  evidence for mitral stenosis. There was no evidence of mitral  regurgitation. Aortic Valve  The aortic valve was trileaflet with normal thickness and cuspal  separation. DOPPLER: Transaortic velocity was within the normal range with  no evidence of aortic stenosis. There was no evidence of aortic  regurgitation. Tricuspid Valve  The tricuspid valve structure was normal with normal leaflet separation. DOPPLER: There was no evidence of tricuspid stenosis. Mild tricuspid  regurgitation visualized. Pulmonic Valve  The pulmonic valve leaflets exhibited normal thickness, no calcification,  and normal cuspal separation. DOPPLER: The transpulmonic velocity was  within the normal range with no evidence for regurgitation. Left Atrium  Left atrial size was normal.    Left Ventricle  Left ventricle size is normal.  Normal left ventricular wall thickness. There was severe global hypokinesis of the left ventricle. Systolic function was severely reduced. Ejection fraction is visually estimated in the range of 30% to 35%. Right Atrium  Right atrial size was normal.    Right Ventricle  The right ventricular size was normal with normal systolic function and  wall thickness. Pericardial Effusion  The pericardium was normal in appearance with no evidence of a pericardial  effusion. Pleural Effusion  No evidence of pleural effusion. Aorta / Great Vessels  -Aortic root dimension within normal limits.  -The Pulmonary artery is within normal limits. -IVC size is within normal limits with normal respiratory phasic changes.     M-Mode/2D Measurements & Calculations    LV Diastolic    LV Systolic Dimension: 4.2  AV Cusp Separation: 1.8 cmLA  Dimension: 5.1  cm                          Dimension: 3.2 cmAO Root  cm              LV Volume Diastolic: 729 ml Dimension: 3.1 cm  LV FS:17.7 %    LV Volume Systolic: 81.3 ml  LV PW           LV EDV/LV EDV Index: 052  Diastolic: 1.1  DQ/78 T^6CH ESV/LV ESV  cm              Index: 58.1 ml/30 m^2       RV Diastolic Dimension: 1.7 cm  Septum          EF Calculated: 43 %  Diastolic: 0.9                              LA/Aorta: 1.03  cm    Doppler Measurements & Calculations    MV Peak E-Wave: 77.6 cm/s AV Peak Velocity: 121 LVOT Peak Velocity: 90.1  MV Peak A-Wave: 84.9 cm/s cm/s                  cm/s  MV E/A Ratio: 0.91        AV Peak Gradient:     LVOT Peak Gradient: 3 mmHg  MV Peak Gradient: 2.41    5.86 mmHg  mmHg                                            TV Peak E-Wave: 44.5 cm/s  TV Peak A-Wave: 48.2 cm/s  MV Deceleration Time: 232  msec                                            TV Peak Gradient: 0.79  MV P1/2t: 68 msec                               mmHg  MVA by PHT:3.24 cm^2                            TR Velocity:209 cm/s  TR Gradient:17.47 mmHg  MV E' Septal Velocity:    AV DVI (Vmax):0.74    PV Peak Velocity: 83.3  5.1 cm/s                                        cm/s  MV A' Septal Velocity:                          PV Peak Gradient: 2.78  6.3 cm/s                                        mmHg  MV E' Lateral Velocity: 4  cm/s  MV A' Lateral Velocity:  5.8 cm/s  E/E' septal: 15.22  E/E' lateral: 19.4    [email or web address]? DocKey=[id number]      STRESS:    CATH:    Assessment/Plan       Diagnosis Orders   1. Preop cardiovascular exam       Preop risk stratification for laryngeal surgery  CAD s/p PCI    Recent Echo showe dnormal LVSF EF 55%  Recent stress test showed no ischemia. No anginal or heart failure symptoms  May proceed with scheduled surgery at moderate risk  The patient is asked to make an attempt to improve diet and exercise patterns to aid in medical management of this problem. Advised more plant based nutrition/meditarrean diet   Advised patient to call office or seek immediate medical attention if there is any new onset of  any chest pain, sob, palpitations, lightheadedness, dizziness, orthopnea, PND or pedal edema. All medication side effects were discussed in details. Thank youfor allowing me to participate in the care of this patient. Please do not hesitate to contact me for any further questions.      Return if symptoms worsen or fail to improve, for Review testing, Regular follow up.        Electronically signed by Sami Navas MD [facility]  7/7/2022 at 2:25 PM EDT

## 2022-07-15 ENCOUNTER — TELEPHONE (OUTPATIENT)
Dept: ENT CLINIC | Age: 72
End: 2022-07-15

## 2022-07-15 ENCOUNTER — PREP FOR PROCEDURE (OUTPATIENT)
Dept: ENT CLINIC | Age: 72
End: 2022-07-15

## 2022-07-15 NOTE — TELEPHONE ENCOUNTER
ICC from spouse who said that Migdalia Chavez is supposed to have  surgery next week and she has an UTI. She was started on Cipro 500 mg BID. Will this affect her surgery or do they need to do anything?

## 2022-07-15 NOTE — TELEPHONE ENCOUNTER
Called and informed patient's  of all Dwyane Roman said. Patient's  verbalized understanding and thanked me.

## 2022-07-19 ENCOUNTER — TELEPHONE (OUTPATIENT)
Dept: ENT CLINIC | Age: 72
End: 2022-07-19

## 2022-07-19 NOTE — TELEPHONE ENCOUNTER
I tried to reach Mojgan Guzman and her  but no answer and no voicemail. I was able to speak with Lester Amos (daughter on hipaa). She will let her mom know that surgery was cancelled for 7/20/22 due to an emergency of Dr Missy Barajas. She will call back to reschedule if she does not hear from the office in a few days.

## 2022-08-06 NOTE — CARE COORDINATION
Discharge planning update:    Remains intubated on ventilator, FiO2 60%, 50 liters oxygen with saturations at 94%. Afebrile. WBC 12.4,  Cardiology consult. Dopamine, Versed drips. Diabetes management. From home with spouse.  Will follow/  Electronically signed by Gladys Ruiz RN on 12/23/2021 at 12:14 PM Emergency Department Attending Provider Note  Location: [facility]  ED  8/5/2022     Patient Identification  Demetria Nick is a 50 y.o. male    Past Medical History:   Diagnosis Date    Endocrine cancer ([address]     Heart attack ([facility] [address] 04/2021    Hyperlipidemia     Tumor cells 4/18/16    [place] Endocrine Tumor- in Prashant Carter was evaluated in the Emergency Department for dissection; per Dr. Mendel Patton, who is bedside, patient had some artifact versus concern for dissection on his recent echo in the office, prompting him to have him evaluated with CTA in the emergency department. Otherwise, patient is actually asymptomatic; no chest pain, shortness of breath, or any other concerns. Vital signs are stable today in the emergency department. .  Has of cardiac history including cardiac arrest in the past.    Although initial history and physical exam information was obtained by NANCY East (who also dictated a record of this visit), I personally saw the patient and performed a substantive portion of the visit including all aspects of the medical decision making. EKG Interpretation:  EKG obtained today in the emergency department shows a normal sinus rhythm. Motion artifact. No ST segment elevation, ST segment depression, hyperacute T waves. Intervals normal.    PLAN:   -Patient seen and evaluated. Relevant records reviewed. Seen by cardiology bedside. CTA reveals dilated aortic root to 5 cm, which Dr. Mendel Patton says he will follow outpatient. Patient remains asymptomatic today in the emergency department. Otherwise, laboratory evaluation largely unremarkable. Will discharge home in stable condition. Follow-up with cardiology as soon as possible. Medications   iopamidol (ISOVUE-370) 76 % injection 150 mL (150 mLs IntraVENous Given 8/5/22 1953)       ICD-10-CM    1. Aortic root dilatation (HCC)  I77.810           I, Naheed Chan DO am the primary clinician of record.      This chart was generated in part by using Dragon Dictation system and may contain errors related to that system including errors in grammar, punctuation, and spelling, as well as words and phrases that may be inappropriate. If there are any questions or concerns please feel free to contact the dictating provider for clarification.      ALEJANDRA JOYA, [address], DO  08/05/22 1700

## 2022-08-09 RX ORDER — SODIUM CHLORIDE 9 MG/ML
INJECTION, SOLUTION INTRAVENOUS PRN
Status: CANCELLED | OUTPATIENT
Start: 2022-08-09

## 2022-08-09 RX ORDER — SODIUM CHLORIDE 0.9 % (FLUSH) 0.9 %
5-40 SYRINGE (ML) INJECTION EVERY 12 HOURS SCHEDULED
Status: CANCELLED | OUTPATIENT
Start: 2022-08-09

## 2022-08-09 RX ORDER — DEXAMETHASONE SODIUM PHOSPHATE 4 MG/ML
8 INJECTION, SOLUTION INTRA-ARTICULAR; INTRALESIONAL; INTRAMUSCULAR; INTRAVENOUS; SOFT TISSUE
Status: CANCELLED | OUTPATIENT
Start: 2022-08-09

## 2022-08-09 RX ORDER — SODIUM CHLORIDE 0.9 % (FLUSH) 0.9 %
5-40 SYRINGE (ML) INJECTION PRN
Status: CANCELLED | OUTPATIENT
Start: 2022-08-09

## 2022-08-10 ENCOUNTER — ANESTHESIA (OUTPATIENT)
Dept: OPERATING ROOM | Age: 72
DRG: 167 | End: 2022-08-10
Payer: MEDICARE

## 2022-08-10 ENCOUNTER — HOSPITAL ENCOUNTER (INPATIENT)
Age: 72
LOS: 4 days | Discharge: HOME HEALTH | DRG: 167 | End: 2022-08-14
Attending: OTOLARYNGOLOGY | Admitting: FAMILY MEDICINE
Payer: MEDICARE

## 2022-08-10 ENCOUNTER — ANESTHESIA EVENT (OUTPATIENT)
Dept: OPERATING ROOM | Age: 72
DRG: 167 | End: 2022-08-10
Payer: MEDICARE

## 2022-08-10 ENCOUNTER — APPOINTMENT (OUTPATIENT)
Dept: GENERAL RADIOLOGY | Age: 72
DRG: 167 | End: 2022-08-10
Attending: OTOLARYNGOLOGY
Payer: MEDICARE

## 2022-08-10 PROBLEM — Z98.890 STATUS POST SURGERY: Status: ACTIVE | Noted: 2022-08-10

## 2022-08-10 PROBLEM — E08.59 DIABETES DUE TO UNDERLYING CONDITION W OTH CIRCULATORY COMP (HCC): Status: ACTIVE | Noted: 2022-08-10

## 2022-08-10 LAB
GLUCOSE BLD-MCNC: 224 MG/DL (ref 70–108)
GLUCOSE BLD-MCNC: 285 MG/DL (ref 70–108)
GLUCOSE BLD-MCNC: 291 MG/DL (ref 70–108)
GLUCOSE BLD-MCNC: 415 MG/DL (ref 70–108)
GLUCOSE BLD-MCNC: 438 MG/DL (ref 70–108)
GLUCOSE BLD-MCNC: 479 MG/DL (ref 70–108)
POTASSIUM REFLEX MAGNESIUM: 4.3 MEQ/L (ref 3.5–5.2)

## 2022-08-10 PROCEDURE — 6360000002 HC RX W HCPCS: Performed by: OTOLARYNGOLOGY

## 2022-08-10 PROCEDURE — 7100000001 HC PACU RECOVERY - ADDTL 15 MIN: Performed by: OTOLARYNGOLOGY

## 2022-08-10 PROCEDURE — 6360000002 HC RX W HCPCS: Performed by: STUDENT IN AN ORGANIZED HEALTH CARE EDUCATION/TRAINING PROGRAM

## 2022-08-10 PROCEDURE — 0CBS8ZZ EXCISION OF LARYNX, VIA NATURAL OR ARTIFICIAL OPENING ENDOSCOPIC: ICD-10-PCS | Performed by: OTOLARYNGOLOGY

## 2022-08-10 PROCEDURE — 0C7S8ZZ DILATION OF LARYNX, VIA NATURAL OR ARTIFICIAL OPENING ENDOSCOPIC: ICD-10-PCS | Performed by: OTOLARYNGOLOGY

## 2022-08-10 PROCEDURE — 36415 COLL VENOUS BLD VENIPUNCTURE: CPT

## 2022-08-10 PROCEDURE — 6370000000 HC RX 637 (ALT 250 FOR IP): Performed by: STUDENT IN AN ORGANIZED HEALTH CARE EDUCATION/TRAINING PROGRAM

## 2022-08-10 PROCEDURE — 7100000000 HC PACU RECOVERY - FIRST 15 MIN: Performed by: OTOLARYNGOLOGY

## 2022-08-10 PROCEDURE — 3600000004 HC SURGERY LEVEL 4 BASE: Performed by: OTOLARYNGOLOGY

## 2022-08-10 PROCEDURE — 2709999900 HC NON-CHARGEABLE SUPPLY: Performed by: OTOLARYNGOLOGY

## 2022-08-10 PROCEDURE — 2580000003 HC RX 258: Performed by: OTOLARYNGOLOGY

## 2022-08-10 PROCEDURE — 82948 REAGENT STRIP/BLOOD GLUCOSE: CPT

## 2022-08-10 PROCEDURE — 6360000002 HC RX W HCPCS: Performed by: REGISTERED NURSE

## 2022-08-10 PROCEDURE — 2060000000 HC ICU INTERMEDIATE R&B

## 2022-08-10 PROCEDURE — 31528 LARYNGOSCOPY AND DILATION: CPT | Performed by: OTOLARYNGOLOGY

## 2022-08-10 PROCEDURE — 31541 LARYNSCOP W/TUMR EXC + SCOPE: CPT | Performed by: OTOLARYNGOLOGY

## 2022-08-10 PROCEDURE — 31630 BRONCHOSCOPY DILATE/FX REPR: CPT | Performed by: OTOLARYNGOLOGY

## 2022-08-10 PROCEDURE — 99223 1ST HOSP IP/OBS HIGH 75: CPT | Performed by: INTERNAL MEDICINE

## 2022-08-10 PROCEDURE — 6370000000 HC RX 637 (ALT 250 FOR IP): Performed by: OTOLARYNGOLOGY

## 2022-08-10 PROCEDURE — C1726 CATH, BAL DIL, NON-VASCULAR: HCPCS | Performed by: OTOLARYNGOLOGY

## 2022-08-10 PROCEDURE — 84132 ASSAY OF SERUM POTASSIUM: CPT

## 2022-08-10 PROCEDURE — 3700000000 HC ANESTHESIA ATTENDED CARE: Performed by: OTOLARYNGOLOGY

## 2022-08-10 PROCEDURE — 31641 BRONCHOSCOPY TREAT BLOCKAGE: CPT | Performed by: OTOLARYNGOLOGY

## 2022-08-10 PROCEDURE — 3700000001 HC ADD 15 MINUTES (ANESTHESIA): Performed by: OTOLARYNGOLOGY

## 2022-08-10 PROCEDURE — 71045 X-RAY EXAM CHEST 1 VIEW: CPT

## 2022-08-10 PROCEDURE — 2500000003 HC RX 250 WO HCPCS: Performed by: REGISTERED NURSE

## 2022-08-10 PROCEDURE — 3600000014 HC SURGERY LEVEL 4 ADDTL 15MIN: Performed by: OTOLARYNGOLOGY

## 2022-08-10 RX ORDER — MIDAZOLAM HYDROCHLORIDE 1 MG/ML
INJECTION INTRAMUSCULAR; INTRAVENOUS PRN
Status: DISCONTINUED | OUTPATIENT
Start: 2022-08-10 | End: 2022-08-10 | Stop reason: SDUPTHER

## 2022-08-10 RX ORDER — DIPHENHYDRAMINE HYDROCHLORIDE 50 MG/ML
12.5 INJECTION INTRAMUSCULAR; INTRAVENOUS
Status: DISCONTINUED | OUTPATIENT
Start: 2022-08-10 | End: 2022-08-10 | Stop reason: HOSPADM

## 2022-08-10 RX ORDER — SODIUM CHLORIDE 9 MG/ML
INJECTION, SOLUTION INTRAVENOUS PRN
Status: DISCONTINUED | OUTPATIENT
Start: 2022-08-10 | End: 2022-08-10 | Stop reason: HOSPADM

## 2022-08-10 RX ORDER — ROCURONIUM BROMIDE 10 MG/ML
INJECTION, SOLUTION INTRAVENOUS PRN
Status: DISCONTINUED | OUTPATIENT
Start: 2022-08-10 | End: 2022-08-10 | Stop reason: SDUPTHER

## 2022-08-10 RX ORDER — ENOXAPARIN SODIUM 100 MG/ML
40 INJECTION SUBCUTANEOUS DAILY
Status: DISCONTINUED | OUTPATIENT
Start: 2022-08-11 | End: 2022-08-14 | Stop reason: HOSPADM

## 2022-08-10 RX ORDER — PROPOFOL 10 MG/ML
INJECTION, EMULSION INTRAVENOUS CONTINUOUS PRN
Status: DISCONTINUED | OUTPATIENT
Start: 2022-08-10 | End: 2022-08-10 | Stop reason: SDUPTHER

## 2022-08-10 RX ORDER — SODIUM CHLORIDE 450 MG/100ML
INJECTION, SOLUTION INTRAVENOUS CONTINUOUS
Status: DISCONTINUED | OUTPATIENT
Start: 2022-08-10 | End: 2022-08-12

## 2022-08-10 RX ORDER — GUAIFENESIN 100 MG/5ML
400 SOLUTION ORAL 2 TIMES DAILY PRN
Status: DISCONTINUED | OUTPATIENT
Start: 2022-08-10 | End: 2022-08-14 | Stop reason: HOSPADM

## 2022-08-10 RX ORDER — SODIUM CHLORIDE 0.9 % (FLUSH) 0.9 %
5-40 SYRINGE (ML) INJECTION EVERY 12 HOURS SCHEDULED
Status: DISCONTINUED | OUTPATIENT
Start: 2022-08-10 | End: 2022-08-10 | Stop reason: HOSPADM

## 2022-08-10 RX ORDER — DEXAMETHASONE SODIUM PHOSPHATE 4 MG/ML
8 INJECTION, SOLUTION INTRA-ARTICULAR; INTRALESIONAL; INTRAMUSCULAR; INTRAVENOUS; SOFT TISSUE
Status: DISCONTINUED | OUTPATIENT
Start: 2022-08-10 | End: 2022-08-10 | Stop reason: HOSPADM

## 2022-08-10 RX ORDER — ONDANSETRON 2 MG/ML
4 INJECTION INTRAMUSCULAR; INTRAVENOUS EVERY 6 HOURS PRN
Status: DISCONTINUED | OUTPATIENT
Start: 2022-08-10 | End: 2022-08-14 | Stop reason: HOSPADM

## 2022-08-10 RX ORDER — HYDRALAZINE HYDROCHLORIDE 20 MG/ML
10 INJECTION INTRAMUSCULAR; INTRAVENOUS
Status: DISCONTINUED | OUTPATIENT
Start: 2022-08-10 | End: 2022-08-10 | Stop reason: HOSPADM

## 2022-08-10 RX ORDER — SODIUM CHLORIDE 0.9 % (FLUSH) 0.9 %
5-40 SYRINGE (ML) INJECTION EVERY 12 HOURS SCHEDULED
Status: DISCONTINUED | OUTPATIENT
Start: 2022-08-10 | End: 2022-08-14 | Stop reason: HOSPADM

## 2022-08-10 RX ORDER — PANTOPRAZOLE SODIUM 40 MG/1
40 TABLET, DELAYED RELEASE ORAL
Status: DISCONTINUED | OUTPATIENT
Start: 2022-08-10 | End: 2022-08-14 | Stop reason: HOSPADM

## 2022-08-10 RX ORDER — ASPIRIN 81 MG/1
81 TABLET, CHEWABLE ORAL DAILY
Status: DISCONTINUED | OUTPATIENT
Start: 2022-08-10 | End: 2022-08-14 | Stop reason: HOSPADM

## 2022-08-10 RX ORDER — ONDANSETRON 4 MG/1
4 TABLET, ORALLY DISINTEGRATING ORAL EVERY 8 HOURS PRN
Status: DISCONTINUED | OUTPATIENT
Start: 2022-08-10 | End: 2022-08-14 | Stop reason: HOSPADM

## 2022-08-10 RX ORDER — ONDANSETRON 2 MG/ML
4 INJECTION INTRAMUSCULAR; INTRAVENOUS
Status: DISCONTINUED | OUTPATIENT
Start: 2022-08-10 | End: 2022-08-10 | Stop reason: HOSPADM

## 2022-08-10 RX ORDER — EPINEPHRINE 1 MG/ML
INJECTION, SOLUTION, CONCENTRATE INTRAVENOUS PRN
Status: DISCONTINUED | OUTPATIENT
Start: 2022-08-10 | End: 2022-08-10 | Stop reason: ALTCHOICE

## 2022-08-10 RX ORDER — OXYCODONE HYDROCHLORIDE 5 MG/1
5 TABLET ORAL EVERY 4 HOURS PRN
Status: DISCONTINUED | OUTPATIENT
Start: 2022-08-10 | End: 2022-08-14 | Stop reason: HOSPADM

## 2022-08-10 RX ORDER — INSULIN GLARGINE 100 [IU]/ML
20 INJECTION, SOLUTION SUBCUTANEOUS NIGHTLY
Status: DISCONTINUED | OUTPATIENT
Start: 2022-08-10 | End: 2022-08-11

## 2022-08-10 RX ORDER — FUROSEMIDE 40 MG/1
40 TABLET ORAL DAILY
Status: DISCONTINUED | OUTPATIENT
Start: 2022-08-10 | End: 2022-08-14 | Stop reason: HOSPADM

## 2022-08-10 RX ORDER — FENTANYL CITRATE 50 UG/ML
50 INJECTION, SOLUTION INTRAMUSCULAR; INTRAVENOUS EVERY 5 MIN PRN
Status: DISCONTINUED | OUTPATIENT
Start: 2022-08-10 | End: 2022-08-10 | Stop reason: HOSPADM

## 2022-08-10 RX ORDER — DEXTROSE MONOHYDRATE 100 MG/ML
INJECTION, SOLUTION INTRAVENOUS CONTINUOUS PRN
Status: DISCONTINUED | OUTPATIENT
Start: 2022-08-10 | End: 2022-08-14 | Stop reason: HOSPADM

## 2022-08-10 RX ORDER — SODIUM CHLORIDE 0.9 % (FLUSH) 0.9 %
5-40 SYRINGE (ML) INJECTION PRN
Status: DISCONTINUED | OUTPATIENT
Start: 2022-08-10 | End: 2022-08-14 | Stop reason: HOSPADM

## 2022-08-10 RX ORDER — SODIUM CHLORIDE 0.9 % (FLUSH) 0.9 %
5-40 SYRINGE (ML) INJECTION PRN
Status: DISCONTINUED | OUTPATIENT
Start: 2022-08-10 | End: 2022-08-10 | Stop reason: HOSPADM

## 2022-08-10 RX ORDER — OXYCODONE HYDROCHLORIDE 5 MG/1
10 TABLET ORAL EVERY 4 HOURS PRN
Status: DISCONTINUED | OUTPATIENT
Start: 2022-08-10 | End: 2022-08-14 | Stop reason: HOSPADM

## 2022-08-10 RX ORDER — INSULIN LISPRO 100 [IU]/ML
0-4 INJECTION, SOLUTION INTRAVENOUS; SUBCUTANEOUS NIGHTLY
Status: DISCONTINUED | OUTPATIENT
Start: 2022-08-10 | End: 2022-08-12

## 2022-08-10 RX ORDER — HYDROXYZINE PAMOATE 25 MG/1
25 CAPSULE ORAL 4 TIMES DAILY PRN
Status: DISCONTINUED | OUTPATIENT
Start: 2022-08-10 | End: 2022-08-14 | Stop reason: HOSPADM

## 2022-08-10 RX ORDER — SODIUM CHLORIDE 9 MG/ML
INJECTION, SOLUTION INTRAVENOUS PRN
Status: DISCONTINUED | OUTPATIENT
Start: 2022-08-10 | End: 2022-08-14 | Stop reason: HOSPADM

## 2022-08-10 RX ORDER — INSULIN LISPRO 100 [IU]/ML
2 INJECTION, SOLUTION INTRAVENOUS; SUBCUTANEOUS ONCE
Status: COMPLETED | OUTPATIENT
Start: 2022-08-10 | End: 2022-08-10

## 2022-08-10 RX ORDER — PROPOFOL 10 MG/ML
INJECTION, EMULSION INTRAVENOUS PRN
Status: DISCONTINUED | OUTPATIENT
Start: 2022-08-10 | End: 2022-08-10 | Stop reason: SDUPTHER

## 2022-08-10 RX ORDER — INSULIN LISPRO 100 [IU]/ML
10 INJECTION, SOLUTION INTRAVENOUS; SUBCUTANEOUS ONCE
Status: COMPLETED | OUTPATIENT
Start: 2022-08-10 | End: 2022-08-10

## 2022-08-10 RX ORDER — GUAIFENESIN 400 MG/1
400 TABLET ORAL 2 TIMES DAILY PRN
Status: DISCONTINUED | OUTPATIENT
Start: 2022-08-10 | End: 2022-08-10

## 2022-08-10 RX ORDER — LISINOPRIL 10 MG/1
10 TABLET ORAL DAILY
Status: DISCONTINUED | OUTPATIENT
Start: 2022-08-10 | End: 2022-08-14 | Stop reason: HOSPADM

## 2022-08-10 RX ORDER — INSULIN LISPRO 100 [IU]/ML
0-8 INJECTION, SOLUTION INTRAVENOUS; SUBCUTANEOUS
Status: DISCONTINUED | OUTPATIENT
Start: 2022-08-10 | End: 2022-08-12

## 2022-08-10 RX ORDER — MEPERIDINE HYDROCHLORIDE 25 MG/ML
12.5 INJECTION INTRAMUSCULAR; INTRAVENOUS; SUBCUTANEOUS EVERY 5 MIN PRN
Status: DISCONTINUED | OUTPATIENT
Start: 2022-08-10 | End: 2022-08-10 | Stop reason: HOSPADM

## 2022-08-10 RX ORDER — NITROGLYCERIN 0.4 MG/1
0.4 TABLET SUBLINGUAL EVERY 5 MIN PRN
Status: DISCONTINUED | OUTPATIENT
Start: 2022-08-10 | End: 2022-08-14 | Stop reason: HOSPADM

## 2022-08-10 RX ORDER — ALBUTEROL SULFATE 0.63 MG/3ML
1 SOLUTION RESPIRATORY (INHALATION) EVERY 6 HOURS PRN
COMMUNITY

## 2022-08-10 RX ORDER — FAMOTIDINE 20 MG/1
40 TABLET, FILM COATED ORAL 2 TIMES DAILY
Status: DISCONTINUED | OUTPATIENT
Start: 2022-08-10 | End: 2022-08-14 | Stop reason: HOSPADM

## 2022-08-10 RX ORDER — POLYETHYLENE GLYCOL 3350 17 G/17G
17 POWDER, FOR SOLUTION ORAL DAILY
Status: DISCONTINUED | OUTPATIENT
Start: 2022-08-10 | End: 2022-08-14 | Stop reason: HOSPADM

## 2022-08-10 RX ORDER — FENTANYL CITRATE 50 UG/ML
INJECTION, SOLUTION INTRAMUSCULAR; INTRAVENOUS PRN
Status: DISCONTINUED | OUTPATIENT
Start: 2022-08-10 | End: 2022-08-10 | Stop reason: SDUPTHER

## 2022-08-10 RX ORDER — GINSENG 100 MG
CAPSULE ORAL EVERY 8 HOURS
Status: DISCONTINUED | OUTPATIENT
Start: 2022-08-10 | End: 2022-08-14 | Stop reason: HOSPADM

## 2022-08-10 RX ORDER — ROSUVASTATIN CALCIUM 10 MG/1
10 TABLET, COATED ORAL DAILY
Status: DISCONTINUED | OUTPATIENT
Start: 2022-08-10 | End: 2022-08-14 | Stop reason: HOSPADM

## 2022-08-10 RX ORDER — METOPROLOL SUCCINATE 25 MG/1
25 TABLET, EXTENDED RELEASE ORAL DAILY
Status: DISCONTINUED | OUTPATIENT
Start: 2022-08-10 | End: 2022-08-14 | Stop reason: HOSPADM

## 2022-08-10 RX ADMIN — FENTANYL CITRATE 50 MCG: 50 INJECTION, SOLUTION INTRAMUSCULAR; INTRAVENOUS at 12:18

## 2022-08-10 RX ADMIN — INSULIN LISPRO 10 UNITS: 100 INJECTION, SOLUTION INTRAVENOUS; SUBCUTANEOUS at 20:53

## 2022-08-10 RX ADMIN — GUAIFENESIN 400 MG: 200 SOLUTION ORAL at 16:31

## 2022-08-10 RX ADMIN — SODIUM CHLORIDE 50 ML/HR: 9 INJECTION, SOLUTION INTRAVENOUS at 09:33

## 2022-08-10 RX ADMIN — SODIUM CHLORIDE: 4.5 INJECTION, SOLUTION INTRAVENOUS at 14:30

## 2022-08-10 RX ADMIN — MIDAZOLAM 2 MG: 1 INJECTION INTRAMUSCULAR; INTRAVENOUS at 10:21

## 2022-08-10 RX ADMIN — INSULIN HUMAN 6 UNITS: 100 INJECTION, SOLUTION PARENTERAL at 13:05

## 2022-08-10 RX ADMIN — ROCURONIUM BROMIDE 20 MG: 10 INJECTION, SOLUTION INTRAVENOUS at 10:45

## 2022-08-10 RX ADMIN — SODIUM CHLORIDE: 9 INJECTION, SOLUTION INTRAVENOUS at 11:13

## 2022-08-10 RX ADMIN — ROCURONIUM BROMIDE 30 MG: 10 INJECTION, SOLUTION INTRAVENOUS at 10:57

## 2022-08-10 RX ADMIN — PIPERACILLIN AND TAZOBACTAM 3375 MG: 3; .375 INJECTION, POWDER, FOR SOLUTION INTRAVENOUS at 16:54

## 2022-08-10 RX ADMIN — LISINOPRIL 10 MG: 10 TABLET ORAL at 16:30

## 2022-08-10 RX ADMIN — BACITRACIN: 500 OINTMENT TOPICAL at 16:39

## 2022-08-10 RX ADMIN — SUGAMMADEX 200 MG: 100 INJECTION, SOLUTION INTRAVENOUS at 11:45

## 2022-08-10 RX ADMIN — INSULIN LISPRO 8 UNITS: 100 INJECTION, SOLUTION INTRAVENOUS; SUBCUTANEOUS at 19:11

## 2022-08-10 RX ADMIN — DEXAMETHASONE SODIUM PHOSPHATE 8 MG: 4 INJECTION, SOLUTION INTRA-ARTICULAR; INTRALESIONAL; INTRAMUSCULAR; INTRAVENOUS; SOFT TISSUE at 09:40

## 2022-08-10 RX ADMIN — MEPERIDINE HYDROCHLORIDE 12.5 MG: 25 INJECTION INTRAMUSCULAR; INTRAVENOUS; SUBCUTANEOUS at 12:20

## 2022-08-10 RX ADMIN — PIPERACILLIN SODIUM,TAZOBACTAM SODIUM 3375 MG: 3; .375 INJECTION, POWDER, FOR SOLUTION INTRAVENOUS at 10:30

## 2022-08-10 RX ADMIN — BACITRACIN: 500 OINTMENT TOPICAL at 21:04

## 2022-08-10 RX ADMIN — FENTANYL CITRATE 100 MCG: 50 INJECTION, SOLUTION INTRAMUSCULAR; INTRAVENOUS at 10:21

## 2022-08-10 RX ADMIN — ROSUVASTATIN 10 MG: 10 TABLET, FILM COATED ORAL at 16:32

## 2022-08-10 RX ADMIN — FENTANYL CITRATE 50 MCG: 50 INJECTION, SOLUTION INTRAMUSCULAR; INTRAVENOUS at 12:27

## 2022-08-10 RX ADMIN — PROPOFOL 150 MG: 10 INJECTION, EMULSION INTRAVENOUS at 10:21

## 2022-08-10 RX ADMIN — FAMOTIDINE 40 MG: 20 TABLET ORAL at 20:19

## 2022-08-10 RX ADMIN — PROPOFOL 100 MCG/KG/MIN: 10 INJECTION, EMULSION INTRAVENOUS at 10:35

## 2022-08-10 RX ADMIN — INSULIN LISPRO 4 UNITS: 100 INJECTION, SOLUTION INTRAVENOUS; SUBCUTANEOUS at 20:20

## 2022-08-10 RX ADMIN — ASPIRIN 81 MG 81 MG: 81 TABLET ORAL at 16:29

## 2022-08-10 RX ADMIN — POLYETHYLENE GLYCOL 3350 17 G: 17 POWDER, FOR SOLUTION ORAL at 16:30

## 2022-08-10 RX ADMIN — INSULIN GLARGINE 20 UNITS: 100 INJECTION, SOLUTION SUBCUTANEOUS at 20:20

## 2022-08-10 RX ADMIN — ROCURONIUM BROMIDE 50 MG: 10 INJECTION, SOLUTION INTRAVENOUS at 10:21

## 2022-08-10 RX ADMIN — FUROSEMIDE 40 MG: 40 TABLET ORAL at 16:29

## 2022-08-10 RX ADMIN — METOPROLOL SUCCINATE 25 MG: 25 TABLET, EXTENDED RELEASE ORAL at 16:29

## 2022-08-10 RX ADMIN — HYDROMORPHONE HYDROCHLORIDE 0.25 MG: 1 INJECTION, SOLUTION INTRAMUSCULAR; INTRAVENOUS; SUBCUTANEOUS at 14:17

## 2022-08-10 RX ADMIN — PANTOPRAZOLE SODIUM 40 MG: 40 TABLET, DELAYED RELEASE ORAL at 16:32

## 2022-08-10 RX ADMIN — INSULIN LISPRO 2 UNITS: 100 INJECTION, SOLUTION INTRAVENOUS; SUBCUTANEOUS at 19:12

## 2022-08-10 RX ADMIN — OXYCODONE 5 MG: 5 TABLET ORAL at 16:30

## 2022-08-10 ASSESSMENT — PAIN SCALES - GENERAL
PAINLEVEL_OUTOF10: 8
PAINLEVEL_OUTOF10: 5
PAINLEVEL_OUTOF10: 8
PAINLEVEL_OUTOF10: 4
PAINLEVEL_OUTOF10: 2
PAINLEVEL_OUTOF10: 8

## 2022-08-10 ASSESSMENT — PAIN DESCRIPTION - DESCRIPTORS
DESCRIPTORS: SORE
DESCRIPTORS: ACHING;SORE
DESCRIPTORS: SORE

## 2022-08-10 ASSESSMENT — PAIN - FUNCTIONAL ASSESSMENT
PAIN_FUNCTIONAL_ASSESSMENT: 0-10
PAIN_FUNCTIONAL_ASSESSMENT: ACTIVITIES ARE NOT PREVENTED
PAIN_FUNCTIONAL_ASSESSMENT: 0-10

## 2022-08-10 ASSESSMENT — PAIN DESCRIPTION - FREQUENCY
FREQUENCY: CONTINUOUS
FREQUENCY: CONTINUOUS

## 2022-08-10 ASSESSMENT — PAIN DESCRIPTION - ORIENTATION
ORIENTATION: INNER

## 2022-08-10 ASSESSMENT — PAIN DESCRIPTION - LOCATION
LOCATION: THROAT

## 2022-08-10 ASSESSMENT — PAIN DESCRIPTION - PAIN TYPE: TYPE: SURGICAL PAIN

## 2022-08-10 NOTE — OP NOTE
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 ([address] [J95.03]  Subglottic stenosis    Post-Op Diagnosis: Same       Procedure(s):  TRANSORAL LARYNGOPLASTY WITH LEFT PARTIAL ARYTENODECTOMY AND POSS LATERALIZATION, ADVANCEMENT FLAP RECONSTRUCTION WITH JET VENT,THERAPUTIC BRONCHOSCOPY WITH DEBRIEDMENT,WITH BALLOON DILITATION  Procedure list edit:  Suspension laryngoscopy and therapeutic bronchoscopy with dilation debridement and resection of obstructing soft tissues in both the larynx and trachea; resection of peristomal superficial granulation tissue      Surgeon(s):  Kehinde Hunter MD    Assistant:   * No surgical staff found *    Anesthesia: General    Estimated Blood Loss (mL): less than 50     Complications: None    Specimens:   * No specimens in log *    Implants:  * No implants in log *      Drains: * No LDAs found *    Findings: 1. The patient's peristomal airway was completely obstructed and required a dressing from subglottis to peristomal trachea in order to enable the patient to be able to tolerate plugging and return to speech  2. This involved extensive debridement and balloon dilation of the subglottis and peristomal trachea     Surgical Images:                                                               Detailed Description of Procedure: The patient was taken to the operating room awake and placed in supine position. General anesthesia was induced and the patient was converted from her cuffless tracheostomy to a #6 endotracheal tube without difficulty or vital sign instability. A timeout was employed verifying the patient's identity and planned procedure. The table was turned 90 degrees for the procedure.   The patient was draped in usual fashion for transoral and trend stomal surgery. Suspension laryngoscopy with balloon dilation and resection of obstructing soft tissues: I began the patient's case by placing her dental protector on her maxillary teeth and then passing a Constantin laryngoscope into the patient's laryngeal inlet bringing the epiglottis anteriorly and providing a direct line of sight view of the airway. The patient's vocal folds were edematous. Her supraglottis was also edematous and she had obvious fluid regurgitated from her stomach in her pharynx. I suctioned this fluid away. I placed true vocal fold spreaders between the glottis on both sides to open the glottic aperture and allow me to visualize the subglottis. I found the patient's subglottis to be profoundly obstructed by edematous soft tissues that have become plicated to 1 another from all 4 sides of the airway; transversely and in the AP plane. I was able to pass my 30 degree optical telescope through the center of this process and visualized the patient's endotracheal tube cuff but I could not instrument these tissues and or separate them initially. I began by placing a 22 cm 4 mm Tricut debrider blade into the anterior aspect of the distal subglottic tissues to open up a small window of access to the more distal tissues in order to visualize the scale of the narrowing and plan for additional care. 1 thing that was certain at this point was that the patient's posterior glottic web does not itself nearly the basis of her airway obstruction but rather edema from likely chronic extra esophageal reflux with microaspiration. I followed this with the placement of an 18 mm pneumatic dilator which I extended to full 18 mm markedly opening the subglottis in the process. I resected additional amounts of the proximal subglottis and turned my attention to the more distal airway.     Therapeutic bronchoscopy with treatment of stenosis and balloon dilation: In like manner I also dilated the peristomal trachea and converted the patient from close circuit ventilation to jet ventilation for this portion of the operation. After dilating it to 18 mm, I was able to see the surrounding soft tissues that were obstructing the airway that included a large volume of fibrinous debris crusted on the circumference of the peristomal trachea as well as a very deep suprastomal shelf of tissue that had collapsed posteriorly and was now freed up post dilation. I used the debrider to resect a significant portion of the length of this suprastomal shelf to reduce its tendency towards recurrence. Topical epinephrine was used for hemostasis. Very little suction cautery was necessary. There was a mild to moderate tendency for the extravasation of blood to go downstream into the trachea which is suctioned away repeatedly. Despite this the patient maintained a saturation of near 100% throughout the case. I used the debrider to resect additional amounts of disease in the parastomal and infra stomal trachea widening these channels in the process. Then turned my attention to the neck skin and the more superficial portion of the tracheostomy tract which was lined with this rind like fibrinous matter narrowing the channel and further encroaching on the patient's airway as well as for safety for changing her own tracheostomy cannula's. I used a 30 degree optical telescope to enable me to resect these tissues with suction cautery to achieve hemostasis. Eventually I remove them in their entirety and the stoma was significantly larger and easier to traverse. I suctioned away the distal blood and reintubated the patient with a #6 cuffed endotracheal tube. And look to make sure that adequate hemostasis had been achieved. It had.   As such I remove the transoral hardware and then collaborated with the anesthetist to reverse neuromuscular blockade so that she was breathing on her own so that we can change her over using Elledingkumar technique to her cuffless tracheostomy. This was done without difficulty or vital sign instability. Patient was then taken to recovery in satisfactory condition. Estimated blood loss the case was less than 50 cc and the patient received approximately a liter of intravenous lactated Ringer's intraoperatively. No blood products were transfused. No intraoperative complications were detected. Counts were considered accurate x3. I performed the patient's entire operation myself. Disposition: The patient will be admitted to a monitored bed where she can receive tracheostomy care and high-dose prednisone with hospitalist assistance dealing with her medical issues most notably the challenges of controlling her blood sugar on high-dose steroids. This is a critical neck step to potentially decannulate the patient if the restenosis of her subglottis and peristomal trachea can be abated by high-dose steroid therapy. Once a dosage regimen of long-acting and short acting insulin can be established at this high dose she can be discharged home with home nursing visits to make sure that she can control her blood sugars.       Electronically signed by Bryon Middleton MD on 8/10/2022 at 7:58 PM

## 2022-08-10 NOTE — CONSULTS
Hospitalist Consult Note        Patient: Naye Preston  Unit/Bed: 3Q-56/408-A  YOB: 1950  Date of Service: 8/10/2022  MRN: [id number]  Acct: [de-identified]  Primary Care Physician: Kaylee Shahid    Date of Admission: 8/10/2022    Chief Complaint: Subglottis     Reason for consult: Diabetes management    Date of Service: Pt seen/examined in consultation on 8/10/2022. History Of Present Illness:      Naye Preston is a 73 y.o. female who we are asked to see/evaluate by Rosas Garcia MD for medical management of chronic medical conditions. The patient suffered from worsening shortness of breath and hoarseness that began after discharge on 1/28/22 where she was recovering from intubation x2 weeks for COVID-19 pneumonia infection resulting in critical illness myopathy and tracheostomy. The patient underwent suspension microlaryngoscopy on 3/22/22 with relief of glottic stenosis, airway balloon dilatation, and laryngeal steroid injection with Dr. Marcelo Guerra    Today the patient underwent transoral laryngoplasty with left partial arytenoidectomy and posterior lateralization, advancement flap reconstruction with jet vent, therapeutic bronchoscopy with debridement and balloon dilatation with Dr. Wanda Warner. Assessment and Plan:    Posterior glottic stenosis - POD #0. Patient underwent transoral laryngoplasty with left partial arytenoidectomy and posterior lateralization, advancement flap reconstruction with jet vent, therapeutic bronchoscopy with debridement and balloon dilatation. Secondary to prolonged intubation (2 weeks) from COVID-19 pneumonia and progressive narrowing airway disease.  - Management per primary    Chronic tracheostomy - Secondary to above. Anup #6 uncuffed present. Currently on trach mask requiring FiO2=50% at flow rate of 3 lpm  - Continue to wean supplemental oxygen as tolerated. - Respiratory therapy to assist per primary team direction. IDDM2 - Uncontrolled. A1c=8.6% (22). Home regimen of Lantus 20 units at night and NPH sliding scale. - Resume Lantus 20 Units nightly; this may need adjustment as patient is receiving high dose steroids.  -  Diabetic diet,POCT glucose 4x AC/HS, Medium dose SSI, Hypoglycemia protocol  - IP BG goal 140-180    Primary HTN - Controlled and normotensive.  - Continue Lisinopril with holding parameters    Hx of CAD - s/p PCI with bare metal stent to LAD 2008 with in stent restenosis treated by cutting balloon and bare metal stent 10/2008. There was 50% stenosis of RCA at that time. - stent placement in . Was following with Dr. Dinora Guerra at [facility]. Stress test 22 negative for ischemia.  - Continue daily ASA and statin     Compensated systolic heart failure NYHA III - With recovered EF. ECHO 3/21/22 EF=60% and prior ECHO 2021 demonstrating EF=30-35% with severe global hypokinesis of the left ventricle.   - Continue Lasix, Toprol XL, and     HLD - Noted  - Continue home statin    Past Medical History:        Diagnosis Date    Arthritis     Coronary artery disease     COVID     Hyperlipidemia     Primary hypertension     Type 2 diabetes mellitus ([facility] [facility] 2018       Past Surgical History:        Procedure Laterality Date    CARDIAC SURGERY      CATARACT REMOVAL Bilateral      SECTION      3 times    CORONARY ANGIOPLASTY WITH STENT PLACEMENT      stenting twice    EYE SURGERY      HIATAL HERNIA REPAIR      late     HYSTERECTOMY, TOTAL ABDOMINAL (CERVIX REMOVED)      in     LARYNGOSCOPY N/A 3/22/2022    SUSPENSON LARYNGOSCOPY WITH JET VENT, DILATION performed by Ariana Ross MD at [address] N/A 3/28/2022    SUSPENSION MICROLARYNGOSCOPY WITH BALLOON DILATION AND JET VENT, KENALOG INJECTION performed by Ariana Ross MD at [address] N/A 2022    Suspension Laryngoscopy  Lateral of Right True Vocal Cord, With Steroid Injection of Larynx And Tracheostomy Tube Change, debridement performed by Destiney Washburn MD at [address]      in [address] [place] [address] N/A 4/1/2022    TRACHEOTOMY TUBE REPLACEMENT performed by Mich Reveles MD at [facility] Medications:   No current facility-administered medications on file prior to encounter. Current Outpatient Medications on File Prior to Encounter   Medication Sig Dispense Refill    albuterol (ACCUNEB) 0.63 MG/3ML nebulizer solution Take 1 ampule by nebulization every 6 hours as needed for Wheezing      lidocaine (XYLOCAINE) 4 % external solution Apply 1 Dose topically as needed for Pain Apply topically as needed. pseudoephedrine-guaiFENesin 120-1200 MG TB12 Take by mouth in the morning and at bedtime      Cholecalciferol (VITAMIN D3 PO) Take by mouth daily 4000 units      NONFORMULARY daily resveratrol      lisinopril (PRINIVIL;ZESTRIL) 10 MG tablet Take 1 tablet by mouth daily 30 tablet 3    guaiFENesin 400 MG tablet Take 400 mg by mouth 2 times daily as needed for Cough      LANTUS SOLOSTAR 100 UNIT/ML injection pen 20 units in PM      insulin NPH (HUMULIN N;NOVOLIN N) 100 UNIT/ML injection vial Inject into the skin 2 times daily (before meals) Takes BID on the SS      metoprolol succinate (TOPROL XL) 25 MG extended release tablet Take 1 tablet by mouth daily 30 tablet 3    hydrOXYzine (VISTARIL) 25 MG capsule Take 1 capsule by mouth 4 times daily as needed for Anxiety 60 capsule 1    albuterol sulfate  (90 Base) MCG/ACT inhaler Inhale 2 puffs into the lungs every 6 hours as needed for Wheezing 18 g 3    furosemide (LASIX) 40 MG tablet Take 1 tablet by mouth daily 60 tablet 3    OXYGEN Inhale 2 L/min into the lungs continuous prn (during activities such as walking) 1 Canister 5    rosuvastatin (CRESTOR) 10 MG tablet Take 10 mg by mouth daily      nitroGLYCERIN (NITROSTAT) 0.4 MG SL tablet Place 0.4 mg under the tongue every 5 minutes as needed for Chest pain up to max of 3 total doses.  If no relief after 1 dose, call 911. (Patient not taking: Reported on 7/12/2022)      aspirin 81 MG chewable tablet Take 81 mg by mouth daily         Allergies:    Metformin and related, Codeine, Meperidine hcl, Sulfa antibiotics, and Sumatriptan    Social History:    reports that she has never smoked. She has never used smokeless tobacco. She reports that she does not currently use alcohol. She reports that she does not use drugs. Family History:       Problem Relation Age of Onset    Parkinson's Disease Father     Lung Cancer Father      Diet:  ADULT DIET; Regular; 4 carb choices (60 gm/meal)    Review of systems:   12 point review of systems completed and pertinent positives are noted in the HPI. All other systems reviewed and negative. PHYSICAL EXAM:  /65   Pulse 73   Temp 98.8 °F (37.1 °C) (Oral)   Resp 16   Ht 5' 2\" (1.575 m)   Wt 202 lb (91.6 kg)   LMP  (LMP Unknown)   SpO2 95%   BMI 36.95 kg/m²     Physical Exam  Vitals and nursing note reviewed. Constitutional:       General: She is awake. She is not in acute distress. Appearance: Normal appearance. She is morbidly obese. She is not ill-appearing or toxic-appearing. HENT:      Head: Normocephalic and atraumatic. Right Ear: External ear normal.      Left Ear: External ear normal.      Nose: Nose normal.      Mouth/Throat:      Mouth: Mucous membranes are moist.      Pharynx: Oropharynx is clear. Eyes:      Conjunctiva/sclera: Conjunctivae normal.      Pupils: Pupils are equal, round, and reactive to light. Neck:      Trachea: Tracheostomy present. Cardiovascular:      Rate and Rhythm: Normal rate and regular rhythm. Pulses: Normal pulses. Popliteal pulses are 2+ on the right side and 2+ on the left side. Dorsalis pedis pulses are 2+ on the right side and 2+ on the left side. Heart sounds: Normal heart sounds. Pulmonary:      Effort: Pulmonary effort is normal. No accessory muscle usage.       Breath sounds: Normal breath sounds. Transmitted upper airway sounds present. No stridor or decreased air movement. Abdominal:      General: Bowel sounds are normal.      Palpations: Abdomen is soft. Musculoskeletal:         General: Normal range of motion. Cervical back: Normal range of motion and neck supple. Right lower leg: No edema. Left lower leg: No edema. Skin:     General: Skin is warm and dry. Capillary Refill: Capillary refill takes less than 2 seconds. Neurological:      General: No focal deficit present. Mental Status: She is alert and oriented to person, place, and time. Mental status is at baseline. GCS: GCS eye subscore is 4. GCS verbal subscore is 5. GCS motor subscore is 6. Cranial Nerves: Cranial nerves are intact. Psychiatric:         Attention and Perception: Attention and perception normal.         Mood and Affect: Mood and affect normal.         Speech: Speech normal.         Behavior: Behavior normal. Behavior is cooperative. Thought Content: Thought content normal.         Cognition and Memory: Cognition and memory normal.         Judgment: Judgment normal.        Labs:   No results for input(s): WBC, HGB, HCT, PLT in the last 72 hours. Recent Labs     08/10/22  0849   K 4.3     No results for input(s): AST, ALT, BILIDIR, BILITOT, ALKPHOS in the last 72 hours. No results for input(s): INR in the last 72 hours. No results for input(s): [place] in the last 72 hours. Urinalysis:    Lab Results   Component Value Date/Time    NITRU NEGATIVE 01/10/2022 01:30 PM    WBCUA 15-25 01/10/2022 01:30 PM    BACTERIA NONE 01/10/2022 01:30 PM    RBCUA > 200 01/10/2022 01:30 PM    BLOODU LARGE 01/10/2022 01:30 PM    SPECGRAV 1.026 12/31/2021 09:45 AM    GLUCOSEU NEGATIVE 01/10/2022 01:30 PM       Radiology:   XR CHEST PORTABLE   Final Result   1. The tracheostomy tube terminates 3.2 cm above the telma just above the level of the clavicles.    2. There is now increased interstitial markings that may be postprocedural or due to the low lung volumes. 3. Marked cardiomegaly. 4. No pneumothorax. **This report has been created using voice recognition software. It may contain minor errors which are inherent in voice recognition technology. **      Final report electronically signed by Dr Nik Pantoja on 8/10/2022 3:05 PM        XR CHEST PORTABLE    Result Date: 8/10/2022  PROCEDURE: XR CHEST PORTABLE CLINICAL INFORMATION: jet vent COMPARISON: Chest radiograph 6/6/2022 TECHNIQUE: AP portable chest radiograph performed. 1. The tracheostomy tube terminates 3.2 cm above the telma just above the level of the clavicles. 2. There is now increased interstitial markings that may be postprocedural or due to the low lung volumes. 3. Marked cardiomegaly. 4. No pneumothorax. **This report has been created using voice recognition software. It may contain minor errors which are inherent in voice recognition technology. ** Final report electronically signed by Dr Nik Pantoja on 8/10/2022 3:05 PM      EKG: No recent EKG. Last EKG 6/6/2022 with Normal sinus rhythm and low voltage QRS. THANK YOU FOR THE CONSULT. Electronically signed by Pool Angulo DO, MBA on 8/10/2022 at 4:55 PM

## 2022-08-10 NOTE — BRIEF OP NOTE
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 The patient's peristomal airway was completely obstructed and required a dressing from subglottis to peristomal trachea in order to enable the patient to be able to tolerate plugging and return to speech  2. This involved extensive debridement and balloon dilation of the subglottis and peristomal trachea    Disposition: The patient will be admitted to a monitored bed where she can receive tracheostomy care and high-dose prednisone with hospitalist assistance dealing with her medical issues most notably the challenges of controlling her blood sugar on high-dose steroids. This is a critical neck step to potentially decannulate the patient if the restenosis of her subglottis and peristomal trachea can be abated by high-dose steroid therapy. Once a dosage regimen of long-acting and short acting insulin can be established at this high dose she can be discharged home with home nursing visits to make sure that she can control her blood sugars. Charo Salazar.  Juliette Giles MD  Cell: [phone]        Electronically signed by Jess Tiwari MD on 8/10/2022 at 1:05 PM

## 2022-08-10 NOTE — PROGRESS NOTES
1212 Pt arrives to recovery responsive to verbal stimulation. Pt respirations are unlabored on 6 Ltrach mask with 50% FIO2. Pt states she is in pain however is unable to rate it. Pt states her pain is not tolerable   1218 Pt medicated with fentanyl for pain   1220 pt shivering and states she is now cold. Pt medicated with demerol. Respirations are unlabored. Pt VSS  1227 Pt states her pain remains to be intolerable. Pt medicated with fentanyl at this time. 1237 Pt resting with eyes closed. Pt states her pain is now tolerable. Respirations are unlabored. VSS  1252 Pt status remains unchanged   1305 pt blood sugar 285. Pt medicated with insulin at this time   1315 report called to [address], rn on 4K   1325 Pt meets criteria for discharge from recovery at this time.    18 Pt departing recovery in stable condition with tranport

## 2022-08-10 NOTE — H&P
HISTORY AND PHYSICAL             Date: 8/10/2022        Patient Name: Jose Antonio Jay     YOB: 1950      Age:  73 y.o. Chief Complaint   No chief complaint on file. Aphonia    History Obtained From   patient    History of Present Illness   The patient is a 77-year-old female who had prolonged intubation for pneumonia and then developed progressively narrowing airway disease in the subglottis and trachea as well as in the posterior commissure in the form of a posterior glottic web. She has had a long interval between this operation and her last 1 for scheduling reasons and now has developed complete aphonia as a complication of the tracheostomy and her related disease. She comes to the operating room for treatment of both.     Past Medical History     Past Medical History:   Diagnosis Date    Arthritis     Coronary artery disease     COVID     Hyperlipidemia     Primary hypertension     Type 2 diabetes mellitus ([facility] [address] 2018        Past Surgical History     Past Surgical History:   Procedure Laterality Date    CARDIAC SURGERY      CATARACT REMOVAL Bilateral      SECTION      3 times    CORONARY ANGIOPLASTY WITH STENT PLACEMENT      stenting twice    EYE SURGERY      HIATAL HERNIA REPAIR      late     HYSTERECTOMY, TOTAL ABDOMINAL (CERVIX REMOVED)      in     LARYNGOSCOPY N/A 3/22/2022    SUSPENSON LARYNGOSCOPY WITH JET VENT, DILATION performed by Sindi Hammond MD at [address] N/A 3/28/2022    SUSPENSION MICROLARYNGOSCOPY WITH BALLOON DILATION AND JET VENT, KENALOG INJECTION performed by Sindi Hammond MD at [address] N/A 2022    Suspension Laryngoscopy  Lateral of Right True Vocal Cord, With Steroid Injection of Larynx And Tracheostomy Tube Change, debridement performed by Zurdo Blake MD at [address]      in [address] 2022    TRACHEOTOMY TUBE REPLACEMENT performed by Sindi Hammond MD at [facility]        Medications Prior to Admission     Prior to Admission medications    Medication Sig Start Date End Date Taking? Authorizing Provider   albuterol (ACCUNEB) 0.63 MG/3ML nebulizer solution Take 1 ampule by nebulization every 6 hours as needed for Wheezing   Yes Historical Provider, MD   lidocaine (XYLOCAINE) 4 % external solution Apply 1 Dose topically as needed for Pain Apply topically as needed.     Historical Provider, MD   pseudoephedrine-guaiFENesin 120-1200 MG TB12 Take by mouth in the morning and at bedtime    Historical Provider, MD   Cholecalciferol (VITAMIN D3 PO) Take by mouth daily 4000 units    Historical Provider, MD   NONFORMULARY daily resveratrol    Historical Provider, MD   lisinopril (PRINIVIL;ZESTRIL) 10 MG tablet Take 1 tablet by mouth daily 6/8/22   Bernice Olsen DO   guaiFENesin 400 MG tablet Take 400 mg by mouth 2 times daily as needed for Cough    Historical Provider, MD   LANTANNERUS SOLOSTAR 100 UNIT/ML injection pen 20 units in PM 4/11/22   Historical Provider, MD   insulin NPH (HUMULIN N;NOVOLIN N) 100 UNIT/ML injection vial Inject into the skin 2 times daily (before meals) Takes BID on the SS    Historical Provider, MD   metoprolol succinate (TOPROL XL) 25 MG extended release tablet Take 1 tablet by mouth daily 4/8/22   Rohit Hodge MD   hydrOXYzine (VISTARIL) 25 MG capsule Take 1 capsule by mouth 4 times daily as needed for Anxiety 1/27/22   Cholo Love MD   albuterol sulfate  (90 Base) MCG/ACT inhaler Inhale 2 puffs into the lungs every 6 hours as needed for Wheezing 1/27/22   Cholo Love MD   furosemide (LASIX) 40 MG tablet Take 1 tablet by mouth daily 1/28/22   Cholo Love MD   OXYGEN Inhale 2 L/min into the lungs continuous prn (during activities such as walking) 1/27/22   Cholo Love MD   rosuvastatin (CRESTOR) 10 MG tablet Take 10 mg by mouth daily    Historical Provider, MD   nitroGLYCERIN (NITROSTAT) 0.4 MG SL tablet Place 0.4 mg under the tongue every 5 minutes as needed for Chest pain up to max of 3 total doses. If no relief after 1 dose, call 911.    Patient not taking: Reported on 7/12/2022    Historical Provider, MD   aspirin 81 MG chewable tablet Take 81 mg by mouth daily    Historical Provider, MD        Allergies   Metformin and related, Codeine, Meperidine hcl, Sulfa antibiotics, and Sumatriptan    Social History     Social History       Tobacco History       Smoking Status  Never      Smokeless Tobacco Use  Never              Alcohol History       Alcohol Use Status  Not Currently Comment  drinks 1 glass of wine cooler or wine about 3 times per year              Drug Use       Drug Use Status  Never              Sexual Activity       Sexually Active  Not Currently                    Family History     Family History   Problem Relation Age of Onset    Parkinson's Disease Father     Lung Cancer Father        Review of Systems   Review of Systems  Noncontributory except as above  Physical Exam   BP (!) 144/69   Pulse 76   Temp (!) 96.2 °F (35.7 °C) (Temporal)   Resp 20   Ht 5' 2\" (1.575 m)   Wt 202 lb (91.6 kg)   LMP  (LMP Unknown)   SpO2 100%   BMI 36.95 kg/m²     Physical Exam  The patient is a pleasant alert and well-oriented older adult female who appears younger than her stated age  She is aphonic but is able to use \"intraoral voice\" to make discernible words in a quiet environment  The patient's tracheostomy appears to be clear of obstructive secretions and there is no signs of recent bleeding or active infection at in the area  She is breathing without labor    Labs      Recent Results (from the past 24 hour(s))   Potassium w/ Reflex to Magnesium    Collection Time: 08/10/22  8:49 AM   Result Value Ref Range    Potassium reflex Magnesium 4.3 3.5 - 5.2 meq/L   Blood glucose - POCT    Collection Time: 08/10/22  9:40 AM   Result Value Ref Range    POC Glucose 224 (H) 70 - 108 mg/dl        Imaging/Diagnostics Last 24 Hours   No results found. 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 Consultations Ordered:  None    Electronically signed by Kvng Wall MD on 8/10/22 at 10:14 AM EDT          Kvng Wall MD   Physician   Specialty:  Otolaryngology   Progress Notes       Signed   Encounter Date:  6/29/2022                 Signed        Expand All Collapse All                                                                                                                                                                                                                                                                                                                                                                                        [facility], NOSE AND THROAT  [address] [place] [address]  Dept: [phone]  Dept Fax: [phone]  Loc: [phone]     Holger Nicole is a 73 y.o. female who was referred by No ref. provider found for:       Chief Complaint   Patient presents with    Follow-up       pt is here for 5-6 f/u.wants to talk about RX         HPI:      Holger Nicole is a 73 y.o. female with a history of prolonged intubation and posterior glottic webbing generating airway obstruction and a need for tracheostomy. The patient has now had a tracheostomy for the past several months and has been gradually developing subglottic and peristomal stenosis that has prevented her from even tolerating her Passy-Janeth speaking valve. There is also been a substantial amount of confusion as to what kind of tracheostomy cannula she has and whether or not she can use reusable versus single use inner cannula's.   This has been to the patient's and her 's consternation. I hope to be able to address these issues with the patient today as well as the plan whether or not she is ready for the next stage of surgery and plan for that operation if indicated. The patient now has significant oxygen dependency and brings an oxygen tank with her for her visit. History:             Allergies   Allergen Reactions    Metformin And Related Other (See Comments)       diarrhea    Codeine Nausea And Vomiting    Meperidine Hcl Nausea And Vomiting    Sulfa Antibiotics Nausea And Vomiting    Sumatriptan Nausea And Vomiting      Current Facility-Administered Medications          Current Outpatient Medications   Medication Sig Dispense Refill    predniSONE (DELTASONE) 2.5 MG tablet Take 2 tablets by mouth daily for 7 days Then take 1 per day for another week and STOP 21 tablet 0    budesonide (PULMICORT) 0.25 MG/2ML nebulizer suspension Take 2 mLs by nebulization 2 times daily 60 each 3    lisinopril (PRINIVIL;ZESTRIL) 10 MG tablet Take 1 tablet by mouth daily 30 tablet 3    guaiFENesin 400 MG tablet Take 400 mg by mouth 2 times daily as needed for Cough        LANTUS SOLOSTAR 100 UNIT/ML injection pen 20 units in PM        insulin NPH (HUMULIN N;NOVOLIN N) 100 UNIT/ML injection vial Inject into the skin 2 times daily (before meals) Takes BID on the SS        metoprolol succinate (TOPROL XL) 25 MG extended release tablet Take 1 tablet by mouth daily 30 tablet 3    hydrOXYzine (VISTARIL) 25 MG capsule Take 1 capsule by mouth 4 times daily as needed for Anxiety 60 capsule 1    albuterol sulfate  (90 Base) MCG/ACT inhaler Inhale 2 puffs into the lungs every 6 hours as needed for Wheezing 18 g 3    diclofenac sodium (VOLTAREN) 1 % GEL Apply 2 g topically 4 times daily as needed for Pain 150 g 3    furosemide (LASIX) 40 MG tablet Take 1 tablet by mouth daily 60 tablet 3    polycarbophil (FIBERCON) 625 MG tablet Take 1 tablet by mouth daily   4 melatonin 3 MG TABS tablet Take 2 tablets by mouth nightly 60 tablet 3    famotidine (PEPCID) 20 MG tablet Take 1 tablet by mouth 2 times daily 60 tablet 3    OXYGEN Inhale 2 L/min into the lungs continuous prn (during activities such as walking) 1 Canister 5    rosuvastatin (CRESTOR) 10 MG tablet Take 10 mg by mouth daily        nitroGLYCERIN (NITROSTAT) 0.4 MG SL tablet Place 0.4 mg under the tongue every 5 minutes as needed for Chest pain up to max of 3 total doses. If no relief after 1 dose, call 911.        aspirin 81 MG chewable tablet Take 81 mg by mouth daily          No current facility-administered medications for this visit.          Past Medical History        Past Medical History:   Diagnosis Date    Arthritis      Coronary artery disease      COVID      Hyperlipidemia      Primary hypertension      Type 2 diabetes mellitus ([facility] [address]          Past Surgical History         Past Surgical History:   Procedure Laterality Date    CARDIAC SURGERY        CATARACT REMOVAL Bilateral      SECTION         3 times    CORONARY ANGIOPLASTY WITH STENT PLACEMENT        stenting twice    EYE SURGERY        HIATAL HERNIA REPAIR         late     HYSTERECTOMY, TOTAL ABDOMINAL (CERVIX REMOVED)         in     LARYNGOSCOPY N/A 3/22/2022     SUSPENSON LARYNGOSCOPY WITH JET VENT, DILATION performed by Shaneka Encinas MD at [address] N/A 3/28/2022     SUSPENSION MICROLARYNGOSCOPY WITH BALLOON DILATION AND JET VENT, KENALOG INJECTION performed by Shaneka Encinas MD at [address] N/A 2022     Suspension Laryngoscopy  Lateral of Right True Vocal Cord, With Steroid Injection of Larynx And Tracheostomy Tube Change, debridement performed by Macrina Ray MD at [address]         in [address] 2022     TRACHEOTOMY TUBE REPLACEMENT performed by Shaneka Encinas MD at [address] History         Family History   Problem Relation Age of Onset    Parkinson's Disease Father      Lung Cancer Father           Social History            Tobacco Use    Smoking status: Never Smoker    Smokeless tobacco: Never Used   Substance Use Topics    Alcohol use: Not Currently       Comment: drinks 1 glass of wine cooler or wine about 3 times per year                    Subjective:      Review of Systems  Rest of review of systems are negative, except as noted in HPI. Objective:      /78 (Site: Right Upper Arm, Position: Sitting)   Pulse 83   Temp 97.1 °F (36.2 °C)   Resp 16   Wt 198 lb (89.8 kg)   LMP  (LMP Unknown)   SpO2 94%   BMI 36.21 kg/m²      Physical Exam         On general physical exam the patient is a pleasant alert and cooperative somewhat ill-appearing older adult female in no acute distress. She was capable of finger occlusion of her tracheostomy to speak but was not using Passy-Janeth valve. On occluding her tracheostomy cannula she was capable of small amount of air influx through her mouth and nose but not for any prolonged interval of time. Her voice was raspy. She coughed and cleared her airway repeatedly. The tracheostomy was in good position and had a modest amount of mucoid staining and no foul odor. No signs of recent bleeding could be seen about the base of the tracheostomy itself. Procedure: Flexible laryngoscopy  Indication: The patient has a history of posterior glottic web with bilateral cricoarytenoid joint fixation needs to be evaluated for the possibility of a follow-on procedure to widen her posterior glottic aperture with hopes of eventual decannulation  Findings: The patient's larynx was well visualized and her posterior glottic aperture was very narrow to less than 20% of normal.  The right side appeared to be more mobile than the left this was difficult to establish with certainty because of the movement of much of the hypopharynx when the patient had an effort at phonation.   No signs of recent bleeding were seen. The patient's endolarynx was difficult to anesthetize despite repeated efforts at doing so. Her anterior commissure appeared sharp. There was modest pooling in the piriform sinuses. Procedure: Transabdominal bronchoscopy:  Indication: Patient has worsening tracheostomy dependence issues particularly in trying to tolerate a speaking valve indicating worsening peristomal tracheal stenosis  Findings: After anesthetizing her trachea and stoma with atomized 4% lidocaine, I was able to remove the cannula and pass a videobronchoscope through her stoma examining the peristomal trachea, the distal trachea, and the mainstem bronchi. I found the infra stomal trachea to be remarkably normal in appearance and aperture. She had no signs of recent bleeding. There was no impending mucous plug in either mainstem. The suprastomal and peristomal trachea however was markedly narrowed by hypertrophic mucosa and granuloma-like tissue. She had a very deep suprastomal shelf preventing my view of her glottis from below. I replaced the cannula without difficulty or vital sign instability. The patient tolerated the procedure well. Flexible laryngoscopy and transabdominal bronchoscopy images: The patient's current cannula    The patient's glottic aperture on attempting to inhale    The patient's glottic aperture and coughing with forceful exhalation    Same    The patient's tracheostomy which is fenestrated being removed    The patient's stoma with a deep suprastomal shelf    The patient's peristomal trachea with obstructing soft tissues             Vitals reviewed. CTA CHEST W WO CONTRAST - r/o Pulmonary Embolism     Result Date: 6/6/2022  Impression: 1. No pulmonary emboli. 2. No thoracic aortic aneurysm or dissection 3. Bilateral lower lobe dependent atelectasis. No pneumonia 4.  Tracheostomy This document has been electronically signed by: Nayan Elise MD on 06/06/2022 07:57 PM All CTs at this facility use dose modulation techniques and iterative reconstructions, and/or weight-based dosing when appropriate to reduce radiation to a low as reasonably achievable. XR CHEST PORTABLE     Result Date: 6/6/2022  Impression: 1. No acute infiltrate This document has been electronically signed by: Feng Farris MD on 06/06/2022 07:19 PM     VL DUP LOWER EXTREMITY VENOUS BILATERAL     Result Date: 6/7/2022  Normal venous ultrasound. No evidence for deep venous thrombosis. **This report has been created using voice recognition software. It may contain minor errors which are inherent in voice recognition technology. ** Final report electronically signed by Dr. Geraldo Patrick on 6/7/2022 5:55 PM           Lab Results   Component Value Date      06/08/2022      06/07/2022      06/06/2022     K 4.0 06/08/2022     K 3.1 06/07/2022     K 3.8 06/06/2022     K 5.2 04/13/2022     K 5.5 04/08/2022      06/08/2022      06/07/2022      06/06/2022     CO2 27 06/08/2022     CO2 22 06/07/2022     CO2 26 06/06/2022     BUN 18 06/08/2022     BUN 15 06/07/2022     BUN 19 06/06/2022     CREATININE 0.8 06/08/2022     CREATININE 0.6 06/07/2022     CREATININE 0.7 06/06/2022     CALCIUM 9.1 06/08/2022     CALCIUM 9.1 06/07/2022     CALCIUM 8.6 06/06/2022     PROT 6.1 06/06/2022     PROT 7.6 03/18/2022     PROT 4.9 01/15/2022     LABALBU 3.9 06/06/2022     LABALBU 4.3 03/18/2022     LABALBU 3.3 01/15/2022     BILITOT 0.2 06/06/2022     BILITOT 0.2 03/18/2022     BILITOT 0.5 01/15/2022     ALKPHOS 66 06/06/2022     ALKPHOS 101 03/18/2022     ALKPHOS 78 01/15/2022     AST 17 06/06/2022     AST 16 03/18/2022     AST 30 01/15/2022     ALT 15 06/06/2022     ALT 9 03/18/2022     ALT 36 01/15/2022         All of the past medical history, past surgical history, family history,social history, allergies and current medications were reviewed with the patient.   Assessment & Plan   Diagnoses and all orders for this visit:       Diagnosis Orders   1. Posterior glottic stenosis     2. Tracheostomy dependence ([address]     3. Hoarseness     4. Dyspnea and respiratory abnormalities     5. Other tracheostomy complication ([address]     6. 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 Vocal cord paralysis            Based on the patient's history and physical findings as well as her flexible laryngoscopy and transabdominal bronchoscopy, the patient has a confluence of problems that include her glottis and her peristomal trachea. Based on my findings today I have confirmed that the patient is an appropriate candidate for a neck stage of surgery with the hopes of improving her glottic aperture. In the upcoming operation my intention is to perform a transoral laryngoplasty with a partial left arytenoidectomy if that side is found to be less mobile than the right on physical exam at the time of surgery. Otherwise I will lateralized the right side. Regardless, the lateralization will be temporary as I will use a slowly reabsorbing suture for the process. Once I can get her glottic aperture to be wide enough then I will work on getting the tracheostomy out. The tracheostomy will also be treated at the time of this next operation by removing obstructing soft tissues so as to make it easier for her to tolerate plugging in her recovery. This may enable her to be downsized from a 6 trach to a for trach but that remains to be seen. Right now we have established that she has a #6 cuffless Shiley with disposable inner cannula's and over 100 inner cannulas to swap out that she was not wanting to use for reasons that had only to do with the fact that she like reusing the cannula's rather than swapping them out. Coming to that understanding was surprisingly difficult. Nonetheless it is the reality and I am confident of it now.      I reviewed with her the indications benefits limitations and risks of proceeding in this manner to her and her 's satisfaction. We will proceed as recommended. She is also going to be tapering off of her prednisone in the near future by going down to 5 mg/day for a week starting  tomorrow  And then down to 2.5 mg/week starting 8 days from now, for 1 week. She is then to stop taking the prednisone and knows that she will have a very low experience for several days and needs to add some salt to her diet during that period of time. I have refilled her budesonide as well. I spent over an hour of face-to-face time with the patient and her  more than half of which was dedicated to reviewing her very complex history and planning this surgical program.     Return in about 4 weeks (around 7/27/2022) for Postop follow-up and endoscopy if indicated. **This report has been created using voice recognition software. It may contain minor errors which are inherent in voice recognition technology. **                                        Office Visit on 6/29/2022    Office Visit on 6/29/2022      Detailed Report    Note shared with patient        Additional Documentation    Vitals:  /78 (Site: Right Upper Arm, Position: Sitting)  Pulse 83  Temp 97.1 °F (36.2 °C)  Resp 16  Wt 198 lb (89.8 kg)  LMP  (LMP Unknown)  SpO2 94%  BMI 36.21 kg/m²  BSA 1.98 m²   Flowsheets:  Vitals Reassessment,  Calorie Assessment     Encounter Info:  Billing Info,  History,  Allergies,  Detailed Report                 Progress Notes Info    Author Note Status Last Update User Last Update Date/Time   Macrina Ray MD Signed Macrina Ray MD 6/29/2022  4:54 PM       Chart Review Routing History    No routing history on file.                 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

## 2022-08-10 NOTE — ANESTHESIA PRE PROCEDURE
Department of Anesthesiology  Preprocedure Note       Name:  Bhupinder Vargas   Age:  73 y.o.  :  1950                                          MRN:  [id number]         Date:  8/10/2022      Surgeon: Sam Calvillo):  Macrina Ray MD    Procedure: Procedure(s):  TRANSORAL LARYNGOPLASTY WITH LEFT PARTIAL ARYTENODECTOMY AND POSS LATERALIZATION, ADVANCEMENT FLAP RECONSTRUCTION WITH JET VENT    Medications prior to admission:   Prior to Admission medications    Medication Sig Start Date End Date Taking? Authorizing Provider   lidocaine (XYLOCAINE) 4 % external solution Apply 1 Dose topically as needed for Pain Apply topically as needed.     Historical Provider, MD   pseudoephedrine-guaiFENesin (MUCINEX D MAX STRENGTH) 120-1200 MG TB12 Take by mouth in the morning and at bedtime    Historical Provider, MD   Cholecalciferol (VITAMIN D3 PO) Take by mouth daily 4000 units    Historical Provider, MD   NONFORMULARY daily resveratrol    Historical Provider, MD   lisinopril (PRINIVIL;ZESTRIL) 10 MG tablet Take 1 tablet by mouth daily 22   Rowdy Wadsworth DO   guaiFENesin 400 MG tablet Take 400 mg by mouth 2 times daily as needed for Cough    Historical Provider, MD   LANTUS SOLOSTAR 100 UNIT/ML injection pen 20 units in PM 22   Historical Provider, MD   insulin NPH (HUMULIN N;NOVOLIN N) 100 UNIT/ML injection vial Inject into the skin 2 times daily (before meals) Takes BID on the SS    Historical Provider, MD   metoprolol succinate (TOPROL XL) 25 MG extended release tablet Take 1 tablet by mouth daily 22   Francheska Cristina MD   hydrOXYzine (VISTARIL) 25 MG capsule Take 1 capsule by mouth 4 times daily as needed for Anxiety 22   Nick Mckinnon MD   albuterol sulfate  (90 Base) MCG/ACT inhaler Inhale 2 puffs into the lungs every 6 hours as needed for Wheezing 22   Nick Mckinnon MD   furosemide (LASIX) 40 MG tablet Take 1 tablet by mouth daily 22   Nick Mckinnon MD   OXYGEN Inhale 2 L/min into the lungs continuous prn (during activities such as walking) 1/27/22   Zakiya Delgado MD   rosuvastatin (CRESTOR) 10 MG tablet Take 10 mg by mouth daily    Historical Provider, MD   nitroGLYCERIN (NITROSTAT) 0.4 MG SL tablet Place 0.4 mg under the tongue every 5 minutes as needed for Chest pain up to max of 3 total doses. If no relief after 1 dose, call 911. Patient not taking: Reported on 7/12/2022    Historical Provider, MD   aspirin 81 MG chewable tablet Take 81 mg by mouth daily    Historical Provider, MD       Current medications:    No current outpatient medications on file. No current facility-administered medications for this visit. Allergies: Allergies   Allergen Reactions    Metformin And Related Other (See Comments)     diarrhea    Codeine Nausea And Vomiting    Meperidine Hcl Nausea And Vomiting    Sulfa Antibiotics Nausea And Vomiting    Sumatriptan Nausea And Vomiting       Problem List:    Patient Active Problem List   Diagnosis Code    COVID-19 U07.1    Hypoxia R09.02    Acute respiratory failure with hypoxia (HCC) J96.01    Debility R53.81    Physical deconditioning R53.81    History of COVID-19 Z86.16    Dysarthria R47.1    Primary hypertension I10    Type 2 diabetes mellitus with hyperglycemia, with long-term current use of insulin ([facility]) E11.65, Z79.4    Obesity (BMI 30-39. 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    Hoarseness R49.0    Unstable angina ([facility]) I20.0    Chronic respiratory failure with hypoxia (HCC) J96.11    Acute chest pain R07.9    Coronary artery disease involving native heart I25.10    Normocytic anemia D64.9    Anxiety disorder F41.9    Gastroesophageal reflux disease K21.9    Other tracheostomy complication (HCC) N86.53    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 ([facility] J95.03       Past Medical History:        Diagnosis Date    Arthritis     Coronary artery disease     COVID     Hyperlipidemia     Primary hypertension     Type 2 diabetes mellitus ([facility] [address] 2018       Past Surgical History:        Procedure Laterality Date    CARDIAC SURGERY      CATARACT REMOVAL Bilateral      SECTION      3 times    CORONARY ANGIOPLASTY WITH STENT PLACEMENT      stenting twice    EYE SURGERY      HIATAL HERNIA REPAIR      late     HYSTERECTOMY, TOTAL ABDOMINAL (CERVIX REMOVED)      in     LARYNGOSCOPY N/A 3/22/2022    SUSPENSON LARYNGOSCOPY WITH JET VENT, DILATION performed by Justin Quinteros MD at [facility] 19 3/28/2022    SUSPENSION MICROLARYNGOSCOPY WITH BALLOON DILATION AND JET VENT, KENALOG INJECTION performed by Justin Quinteros MD at [address] N/A 2022    Suspension Laryngoscopy  Lateral of Right True Vocal Cord, With Steroid Injection of Larynx And Tracheostomy Tube Change, debridement performed by Audra Negron MD at [address] [facility]      in [place] [address] N/A 2022    TRACHEOTOMY TUBE REPLACEMENT performed by Justin Quinteros MD at [address] History:    Social History     Tobacco Use    Smoking status: Never    Smokeless tobacco: Never   Substance Use Topics    Alcohol use: Not Currently     Comment: drinks 1 glass of wine cooler or wine about 3 times per year                                Counseling given: Not Answered      Vital Signs (Current): There were no vitals filed for this visit.                                            BP Readings from Last 3 Encounters:   22 136/76   22 122/78   22 98/70       NPO Status: BMI:   Wt Readings from Last 3 Encounters:   07/07/22 196 lb 3.2 oz (89 kg)   06/29/22 198 lb (89.8 kg)   06/07/22 198 lb 13.7 oz (90.2 kg)     There is no height or weight on file to calculate BMI.    CBC:   Lab Results   Component Value Date/Time    WBC 10.0 06/08/2022 04:11 AM    RBC 3.18 06/08/2022 04:11 AM    HGB 9.3 06/08/2022 04:11 AM    HCT 30.0 06/08/2022 04:11 AM    MCV 94.3 06/08/2022 04:11 AM     06/08/2022 04:11 AM       CMP:   Lab Results   Component Value Date/Time     06/08/2022 04:11 AM    K 4.0 06/08/2022 04:11 AM    K 3.8 06/06/2022 05:20 PM     06/08/2022 04:11 AM    CO2 27 06/08/2022 04:11 AM    BUN 18 06/08/2022 04:11 AM    CREATININE 0.8 06/08/2022 04:11 AM    LABGLOM 71 06/08/2022 04:11 AM    GLUCOSE 139 06/08/2022 04:11 AM    PROT 6.1 06/06/2022 05:20 PM    CALCIUM 9.1 06/08/2022 04:11 AM    BILITOT 0.2 06/06/2022 05:20 PM    ALKPHOS 66 06/06/2022 05:20 PM    AST 17 06/06/2022 05:20 PM    ALT 15 06/06/2022 05:20 PM       POC Tests:   No results for input(s): POCGLU, POCNA, POCK, POCCL, POCBUN, POCHEMO, POCHCT in the last 72 hours.     Coags: No results found for: PROTIME, INR, APTT    HCG (If Applicable): No results found for: PREGTESTUR, PREGSERUM, HCG, HCGQUANT     ABGs: No results found for: PHART, PO2ART, VMO2FFB, BHU5KQA, BEART, C4KUJAUR     Type & Screen (If Applicable):  No results found for: LABABO, LABRH    Drug/Infectious Status (If Applicable):  No results found for: HIV, HEPCAB    COVID-19 Screening (If Applicable):   Lab Results   Component Value Date/Time    COVID19 detected 12/17/2021 12:00 AM           Anesthesia Evaluation  Patient summary reviewed no history of anesthetic complications:   Airway: Mallampati: III  TM distance: >3 FB   Neck ROM: full  Comment: trach  Mouth opening: > = 3 FB   Dental: normal exam         Pulmonary:normal exam        (-) COPD and asthma                           Cardiovascular:    (+) hypertension:, CAD:, CABG/stent (2 stents 15 years ago): no interval change,       ECG reviewed      Echocardiogram reviewed                  Neuro/Psych:   (+) neuromuscular disease:,    (-) seizures and CVA           GI/Hepatic/Renal:   (+) GERD: well controlled,      (-) liver disease and no renal disease       Endo/Other:    (+) DiabetesType II DM, , .                 Abdominal:   (+) obese,           Vascular:     - DVT. Other Findings:             Anesthesia Plan      general     ASA 3       Induction: inhalational and intravenous. MIPS: Postoperative opioids intended and Prophylactic antiemetics administered. Anesthetic plan and risks discussed with patient and spouse. Plan discussed with CRNA.                     Lindy Felder DO   8/10/2022

## 2022-08-10 NOTE — CARE COORDINATION
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 Barriers to Discharge:    Tracheal Stenosis/Aphonia    8/10 TRANSORAL LARYNGOPLASTY WITH LEFT PARTIAL ARYTENODECTOMY AND POSS LATERALIZATION, ADVANCEMENT FLAP RECONSTRUCTION WITH JET VENT    Elevated GLUC    Trach Mask 50% FIO2, IV Steroid, IV AB      PCP: Hayder Echeverria  Readmission Risk Score: 16.4%  Patient's Healthcare Decision Maker: Legal Next of Kin    Patient Goals/Plan/Treatment Preferences: plans home w spouse Drema Alan; has [employer] home oxygen 2L as needed, trach, suction, air compressor, WC, nebulizer  Transportation/Food Security/Housekeeping Addressed:  No issues identified.

## 2022-08-10 NOTE — ANESTHESIA POSTPROCEDURE EVALUATION
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     Patricia Phase I: Patricia Score: 8    Patricia Phase II:        Anesthesia Post Evaluation    Patient location during evaluation: PACU  Patient participation: complete - patient participated  Level of consciousness: awake and alert  Airway patency: patent  Nausea & Vomiting: no vomiting and no nausea  Complications: no  Cardiovascular status: hemodynamically stable  Respiratory status: acceptable and T-piece (trach)  Hydration status: stable

## 2022-08-11 ENCOUNTER — APPOINTMENT (OUTPATIENT)
Dept: GENERAL RADIOLOGY | Age: 72
DRG: 167 | End: 2022-08-11
Attending: OTOLARYNGOLOGY
Payer: MEDICARE

## 2022-08-11 PROBLEM — R79.89 ELEVATED SERUM CREATININE: Status: ACTIVE | Noted: 2022-08-11

## 2022-08-11 LAB
CREAT SERPL-MCNC: 1.1 MG/DL (ref 0.4–1.2)
GFR SERPL CREATININE-BSD FRML MDRD: 49 ML/MIN/1.73M2
GLUCOSE BLD-MCNC: 332 MG/DL (ref 70–108)
GLUCOSE BLD-MCNC: 334 MG/DL (ref 70–108)
GLUCOSE BLD-MCNC: 347 MG/DL (ref 70–108)
GLUCOSE BLD-MCNC: 348 MG/DL (ref 70–108)
GLUCOSE BLD-MCNC: 413 MG/DL (ref 70–108)

## 2022-08-11 PROCEDURE — 82948 REAGENT STRIP/BLOOD GLUCOSE: CPT

## 2022-08-11 PROCEDURE — 99222 1ST HOSP IP/OBS MODERATE 55: CPT | Performed by: INTERNAL MEDICINE

## 2022-08-11 PROCEDURE — 6370000000 HC RX 637 (ALT 250 FOR IP): Performed by: STUDENT IN AN ORGANIZED HEALTH CARE EDUCATION/TRAINING PROGRAM

## 2022-08-11 PROCEDURE — C1751 CATH, INF, PER/CENT/MIDLINE: HCPCS

## 2022-08-11 PROCEDURE — 36569 INSJ PICC 5 YR+ W/O IMAGING: CPT

## 2022-08-11 PROCEDURE — 76937 US GUIDE VASCULAR ACCESS: CPT

## 2022-08-11 PROCEDURE — 82565 ASSAY OF CREATININE: CPT

## 2022-08-11 PROCEDURE — 99024 POSTOP FOLLOW-UP VISIT: CPT | Performed by: PHYSICIAN ASSISTANT

## 2022-08-11 PROCEDURE — 71045 X-RAY EXAM CHEST 1 VIEW: CPT

## 2022-08-11 PROCEDURE — 99233 SBSQ HOSP IP/OBS HIGH 50: CPT | Performed by: STUDENT IN AN ORGANIZED HEALTH CARE EDUCATION/TRAINING PROGRAM

## 2022-08-11 PROCEDURE — 2580000003 HC RX 258: Performed by: OTOLARYNGOLOGY

## 2022-08-11 PROCEDURE — 36415 COLL VENOUS BLD VENIPUNCTURE: CPT

## 2022-08-11 PROCEDURE — 6360000002 HC RX W HCPCS: Performed by: OTOLARYNGOLOGY

## 2022-08-11 PROCEDURE — 6370000000 HC RX 637 (ALT 250 FOR IP): Performed by: OTOLARYNGOLOGY

## 2022-08-11 PROCEDURE — 2060000000 HC ICU INTERMEDIATE R&B

## 2022-08-11 RX ORDER — INSULIN LISPRO 100 [IU]/ML
10 INJECTION, SOLUTION INTRAVENOUS; SUBCUTANEOUS ONCE
Status: COMPLETED | OUTPATIENT
Start: 2022-08-11 | End: 2022-08-11

## 2022-08-11 RX ORDER — INSULIN GLARGINE 100 [IU]/ML
30 INJECTION, SOLUTION SUBCUTANEOUS NIGHTLY
Status: DISCONTINUED | OUTPATIENT
Start: 2022-08-11 | End: 2022-08-12

## 2022-08-11 RX ORDER — INSULIN LISPRO 100 [IU]/ML
10 INJECTION, SOLUTION INTRAVENOUS; SUBCUTANEOUS
Status: DISCONTINUED | OUTPATIENT
Start: 2022-08-11 | End: 2022-08-12

## 2022-08-11 RX ADMIN — PREDNISONE 15 MG: 10 TABLET ORAL at 21:04

## 2022-08-11 RX ADMIN — SODIUM CHLORIDE: 4.5 INJECTION, SOLUTION INTRAVENOUS at 06:55

## 2022-08-11 RX ADMIN — ASPIRIN 81 MG 81 MG: 81 TABLET ORAL at 09:00

## 2022-08-11 RX ADMIN — SODIUM CHLORIDE, PRESERVATIVE FREE 10 ML: 5 INJECTION INTRAVENOUS at 09:00

## 2022-08-11 RX ADMIN — PANTOPRAZOLE SODIUM 40 MG: 40 TABLET, DELAYED RELEASE ORAL at 16:07

## 2022-08-11 RX ADMIN — INSULIN GLARGINE 30 UNITS: 100 INJECTION, SOLUTION SUBCUTANEOUS at 21:02

## 2022-08-11 RX ADMIN — PIPERACILLIN AND TAZOBACTAM 3375 MG: 3; .375 INJECTION, POWDER, FOR SOLUTION INTRAVENOUS at 09:27

## 2022-08-11 RX ADMIN — INSULIN LISPRO 8 UNITS: 100 INJECTION, SOLUTION INTRAVENOUS; SUBCUTANEOUS at 12:54

## 2022-08-11 RX ADMIN — INSULIN LISPRO 10 UNITS: 100 INJECTION, SOLUTION INTRAVENOUS; SUBCUTANEOUS at 01:49

## 2022-08-11 RX ADMIN — PREDNISONE 15 MG: 10 TABLET ORAL at 09:00

## 2022-08-11 RX ADMIN — SODIUM CHLORIDE, PRESERVATIVE FREE 10 ML: 5 INJECTION INTRAVENOUS at 21:03

## 2022-08-11 RX ADMIN — INSULIN LISPRO 4 UNITS: 100 INJECTION, SOLUTION INTRAVENOUS; SUBCUTANEOUS at 21:03

## 2022-08-11 RX ADMIN — INSULIN LISPRO 6 UNITS: 100 INJECTION, SOLUTION INTRAVENOUS; SUBCUTANEOUS at 17:33

## 2022-08-11 RX ADMIN — BACITRACIN: 500 OINTMENT TOPICAL at 09:00

## 2022-08-11 RX ADMIN — BACITRACIN: 500 OINTMENT TOPICAL at 16:08

## 2022-08-11 RX ADMIN — INSULIN LISPRO 10 UNITS: 100 INJECTION, SOLUTION INTRAVENOUS; SUBCUTANEOUS at 17:33

## 2022-08-11 RX ADMIN — ENOXAPARIN SODIUM 40 MG: 100 INJECTION SUBCUTANEOUS at 09:00

## 2022-08-11 RX ADMIN — LISINOPRIL 10 MG: 10 TABLET ORAL at 09:00

## 2022-08-11 RX ADMIN — FAMOTIDINE 40 MG: 20 TABLET ORAL at 09:00

## 2022-08-11 RX ADMIN — PIPERACILLIN AND TAZOBACTAM 3375 MG: 3; .375 INJECTION, POWDER, FOR SOLUTION INTRAVENOUS at 00:08

## 2022-08-11 RX ADMIN — PANTOPRAZOLE SODIUM 40 MG: 40 TABLET, DELAYED RELEASE ORAL at 09:00

## 2022-08-11 RX ADMIN — ROSUVASTATIN 10 MG: 10 TABLET, FILM COATED ORAL at 09:00

## 2022-08-11 RX ADMIN — METOPROLOL SUCCINATE 25 MG: 25 TABLET, EXTENDED RELEASE ORAL at 09:00

## 2022-08-11 RX ADMIN — POLYETHYLENE GLYCOL 3350 17 G: 17 POWDER, FOR SOLUTION ORAL at 21:09

## 2022-08-11 RX ADMIN — INSULIN LISPRO 6 UNITS: 100 INJECTION, SOLUTION INTRAVENOUS; SUBCUTANEOUS at 08:50

## 2022-08-11 RX ADMIN — INSULIN LISPRO 10 UNITS: 100 INJECTION, SOLUTION INTRAVENOUS; SUBCUTANEOUS at 12:54

## 2022-08-11 RX ADMIN — FUROSEMIDE 40 MG: 40 TABLET ORAL at 09:00

## 2022-08-11 RX ADMIN — PIPERACILLIN AND TAZOBACTAM 3375 MG: 3; .375 INJECTION, POWDER, FOR SOLUTION INTRAVENOUS at 16:05

## 2022-08-11 RX ADMIN — FAMOTIDINE 40 MG: 20 TABLET ORAL at 21:03

## 2022-08-11 RX ADMIN — SODIUM CHLORIDE: 4.5 INJECTION, SOLUTION INTRAVENOUS at 16:07

## 2022-08-11 ASSESSMENT — PAIN SCALES - GENERAL
PAINLEVEL_OUTOF10: 0
PAINLEVEL_OUTOF10: 0

## 2022-08-11 NOTE — PROGRESS NOTES
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 [facility]) [J95.03]  Status post surgery [Z98.890]  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 [facility]) [E08.59]      Attending Physician- Torres Robles MD  Ordering Physician-same  Indication for Insertion: Poor Vascular Access    Catheter Insertion Date- 8/11/2022   Lot Number- [id number]  Gauge-5  Lumen-dual    Insertion Site- ARLYN Brachial  Vein Diameter- 1.24 mm  Catheter Length- 39 cm  Internal Length- 39 cm  Exposed Catheter Length- 0cm   Upper Arm Circumference- 31cm  Tip Confirmation System Bundle met- No: xray  If X-ray required, Tip Location- SVC  Radiologist- Marce Russell    PICC insertion successful- Yes  Ultrasound- yes    Okay To Use PICC- Yes    Electronically signed by Vernon Douglas, RN, RN on 8/11/2022 at 3:22 PM

## 2022-08-11 NOTE — PLAN OF CARE
Problem: Discharge Planning  Goal: Discharge to home or other facility with appropriate resources  Outcome: Progressing  Flowsheets (Taken 8/11/2022 0845)  Discharge to home or other facility with appropriate resources:   Identify barriers to discharge with patient and caregiver   Arrange for needed discharge resources and transportation as appropriate   Identify discharge learning needs (meds, wound care, etc)   Refer to discharge planning if patient needs post-hospital services based on physician order or complex needs related to functional status, cognitive ability or social support system     Problem: Chronic Conditions and Co-morbidities  Goal: Patient's chronic conditions and co-morbidity symptoms are monitored and maintained or improved  Outcome: Progressing  Flowsheets (Taken 8/11/2022 0845)  Care Plan - Patient's Chronic Conditions and Co-Morbidity Symptoms are Monitored and Maintained or Improved:   Monitor and assess patient's chronic conditions and comorbid symptoms for stability, deterioration, or improvement   Collaborate with multidisciplinary team to address chronic and comorbid conditions and prevent exacerbation or deterioration   Update acute care plan with appropriate goals if chronic or comorbid symptoms are exacerbated and prevent overall improvement and discharge     Problem: Pain  Goal: Verbalizes/displays adequate comfort level or baseline comfort level  Outcome: Progressing  Flowsheets (Taken 8/11/2022 0845)  Verbalizes/displays adequate comfort level or baseline comfort level:   Encourage patient to monitor pain and request assistance   Assess pain using appropriate pain scale   Administer analgesics based on type and severity of pain and evaluate response   Implement non-pharmacological measures as appropriate and evaluate response     Problem: Safety - Adult  Goal: Free from fall injury  Outcome: Progressing  Flowsheets  Taken 8/11/2022 1129  Free From Fall Injury: Instruct family/caregiver on patient safety    Problem: ABCDS Injury Assessment  Goal: Absence of physical injury  Outcome: Progressing  Flowsheets (Taken 8/11/2022 0800)  Absence of Physical Injury: Implement safety measures based on patient assessment      Care plan reviewed with patient and family. Patient and family verbalize understanding of the plan of care and contribute to goal setting.

## 2022-08-11 NOTE — PROGRESS NOTES
2006- Patient's blood sugar 479. Gave 20 units of lantus and 4 units humalog. Notified Dr. Pieter Phalen of glucose level. Dr. Pieter Phalen ordered additional 10 units of insulin and to recheck in 1 hour. 2240- Patient's glucose 415. Notified Dr. Pieter Phalen, recheck glucose in two hours per Dr. Marie Potter- Patient's glucose 314. Dr. Pieter Phalen was notified and ordered 10 units humalog and recheck in the morning.

## 2022-08-11 NOTE — PROGRESS NOTES
Completed admission navigator with spouse, Rossana Dayana. Will need to notify Dr. Lazaro Chin office in AM of admit. Also C-SSRS screen will need to be completed when  is not at bedside.

## 2022-08-11 NOTE — PROGRESS NOTES
Department of Otolaryngology  Progress Note    Chief Complaint:  POD #1 Suspension laryngoscopy and therapeutic bronchoscopy with dilation debridement and resection of obstructing soft tissues in both the larynx and trachea; resection of peristomal superficial granulation tissue    SUBJECTIVE: Patient reports that she is doing well today. She is able to use her finger to occlude her tracheostomy and vocalize. She reports that she was unable to vocalize with finger occlusion of tracheostomy prior to surgery. She feels that she is breathing well without any issues. Patient's blood glucose has been elevated since admission and at least partially related to p.o. prednisone. Patient denies any fevers, chills, nausea/vomiting, chest pain, shortness of breath. She report some bloody mucus from tracheostomy site yesterday evening and earlier this morning. Bloody discharge has since resolved. No other symptoms or concerns reported at this time. REVIEW OF SYSTEMS:    A complete multi-organ review of systems was performed and reviewed by me.   ENT:  negative except as noted in HPI  CONSTITUTIONAL:  negative except as noted in HPI  EYES:  negative except as noted in HPI  RESPIRATORY:  negative except as noted in HPI  CARDIOVASCULAR:  negative except as noted in HPI  GASTROINTESTINAL:  negative except as noted in HPI  GENITOURINARY:  negative except as noted in HPI  MUSCULOSKELETAL:  negative except as noted in HPI  SKIN:  negative except as noted in HPI  ENDOCRINE/METABOLIC: negative except as noted in HPI  HEMATOLOGIC/LYMPHATIC:  negative except as noted in HPI  ALLERGY/IMMUN: negative except as noted in HPI  NEUROLOGICAL:  negative except as noted in HPI  BEHAVIOR/PSYCH:  negative except as noted in HPI    OBJECTIVE      Physical  VITALS:  BP (!) 161/77   Pulse 76   Temp 98.5 °F (36.9 °C) (Oral)   Resp 21   Ht 5' 2\" (1.575 m)   Wt 206 lb 1.6 oz (93.5 kg)   LMP  (LMP Unknown)   SpO2 100%   BMI 37.70 kg/m² This is a 73 y.o. female. Patient is alert and oriented to person, place and time. Patient appears well developed, well nourished. Mood is happy with normal affect. Not obviously hearing impaired. Patient is able to vocalize with finger occlusion of her tracheostomy    Patient resting comfortably in hospital bed upon my arrival.  Her breathing is unlabored without evidence of respiratory distress. Trach mask in place. No palpable neck edema or crepitus is noted. No induration or fluctuance concerning for seroma/hematoma.   Inner cannula of the tracheostomy was removed which had a scant amount of pink-tinged mucus    Inpatient Medications  Current Facility-Administered Medications: insulin lispro (HUMALOG) injection vial 10 Units, 10 Units, SubCUTAneous, TID WC  insulin glargine (LANTUS) injection vial 30 Units, 30 Units, SubCUTAneous, Nightly  sodium chloride flush 0.9 % injection 5-40 mL, 5-40 mL, IntraVENous, 2 times per day  sodium chloride flush 0.9 % injection 5-40 mL, 5-40 mL, IntraVENous, PRN  0.9 % sodium chloride infusion, , IntraVENous, PRN  ondansetron (ZOFRAN-ODT) disintegrating tablet 4 mg, 4 mg, Oral, Q8H PRN **OR** ondansetron (ZOFRAN) injection 4 mg, 4 mg, IntraVENous, Q6H PRN  bacitracin ointment, , Topical, Q8H  0.45 % sodium chloride infusion, , IntraVENous, Continuous  oxyCODONE (ROXICODONE) immediate release tablet 5 mg, 5 mg, Oral, Q4H PRN **OR** oxyCODONE (ROXICODONE) immediate release tablet 10 mg, 10 mg, Oral, Q4H PRN  HYDROmorphone (DILAUDID) injection 0.25 mg, 0.25 mg, IntraVENous, Q3H PRN **OR** HYDROmorphone (DILAUDID) injection 0.5 mg, 0.5 mg, IntraVENous, Q3H PRN  polyethylene glycol (GLYCOLAX) packet 17 g, 17 g, Oral, Daily  enoxaparin (LOVENOX) injection 40 mg, 40 mg, SubCUTAneous, Daily  piperacillin-tazobactam (ZOSYN) 3,375 mg in dextrose 5 % 50 mL IVPB extended infusion (mini-bag), 3,375 mg, IntraVENous, Q8H  rosuvastatin (CRESTOR) tablet 10 mg, 10 mg, Oral, Daily  nitroGLYCERIN (NITROSTAT) SL tablet 0.4 mg, 0.4 mg, SubLINGual, Q5 Min PRN  aspirin chewable tablet 81 mg, 81 mg, Oral, Daily  hydrOXYzine pamoate (VISTARIL) capsule 25 mg, 25 mg, Oral, 4x Daily PRN  furosemide (LASIX) tablet 40 mg, 40 mg, Oral, Daily  metoprolol succinate (TOPROL XL) extended release tablet 25 mg, 25 mg, Oral, Daily  lisinopril (PRINIVIL;ZESTRIL) tablet 10 mg, 10 mg, Oral, Daily  pantoprazole (PROTONIX) tablet 40 mg, 40 mg, Oral, BID AC  famotidine (PEPCID) tablet 40 mg, 40 mg, Oral, BID  predniSONE (DELTASONE) tablet 15 mg, 15 mg, Oral, BID  guaiFENesin (ROBITUSSIN) 100 MG/5ML oral solution 400 mg, 400 mg, Oral, BID PRN  glucose chewable tablet 16 g, 4 tablet, Oral, PRN  dextrose bolus 10% 125 mL, 125 mL, IntraVENous, PRN **OR** dextrose bolus 10% 250 mL, 250 mL, IntraVENous, PRN  glucagon (rDNA) injection 1 mg, 1 mg, SubCUTAneous, PRN  dextrose 10 % infusion, , IntraVENous, Continuous PRN  insulin lispro (HUMALOG) injection vial 0-8 Units, 0-8 Units, SubCUTAneous, TID WC  insulin lispro (HUMALOG) injection vial 0-4 Units, 0-4 Units, SubCUTAneous, Nightly    ASSESSMENT AND PLAN    POD #1 Suspension laryngoscopy and therapeutic bronchoscopy with dilation debridement and resection of obstructing soft tissues in both the larynx and trachea; resection of peristomal superficial granulation tissue    -Patient doing well postoperatively. Pink-tinged mucus in tracheostomy as expected and should gradually improve.   Contact ENT with worsening bloody secretions  -Blood glucose per hospitalist recommendation  -Patient will need home health at discharge to help with monitoring blood glucose and ensure she is appropriately managed and she will continue on prednisone at discharge    Electronically signed by MICHAEL Meyer on 8/11/2022 at 2:40 PM

## 2022-08-11 NOTE — FLOWSHEET NOTE
08/11/22 1400   Safe Environment   Safety Measures Other (comment)  (virtual safety round complete)     Virtual Nurse rounds, staff responded to audio that they were with the patient, camera not turned on

## 2022-08-11 NOTE — CARE COORDINATION
Collaborative Discharge Planning    Samantha Velásquez  :  1950  MRN:  [id number]    ADMIT DATE:  8/10/2022      Discharge Planning Discharge Planning  Type of Residence: House  Living Arrangements: Spouse/Significant Other  Support Systems: Spouse/Significant Other  Current Services Prior To Admission: Durable Medical Equipment, Oxygen Therapy  Potential Assistance Needed: N/A  DME: Oxygen Therapy (Comment), Home Aerosol, Trach Suction Supplies  DME Ordered?: Oxygen therapy (comment), Home aerosol, Trach suction supplies  Potential Assistance Purchasing Medications: No  Type of Home Care Services: None  Patient expects to be discharged to[de-identified] House  One/Two Story Residence: One story  History of falls?: No  White Board Notes /Social Work Whiteboard Notes  /Social Work Whiteboard: 8/10 CM; plans home w spouse Mindy Santos, has [employer] home oxygen 2L to trach; [facility] in past    Procedure 8/10 Suspension laryngoscopy and therapeutic bronchoscopy with dilation debridement and resection of obstructing soft tissues in both the larynx and trachea; resection of peristomal superficial granulation tissue    Discharge Plan Home with family & O2 from [facility]    Discharge Milestones and Delays: Administering IV medications and Clinical status  POD 1   Consult to Hospitalist  Elevated blood glucose (300-400's)  Consult to nephrology (CKD 3)  PICC line to be placed  IVF, IV Zosyn  Trach 3L, 50% FiO2.      SIGNED:  Jason Slater RN   2022, 2:55 PM [No Acute Distress] : no acute distress [Normal Oropharynx] : normal oropharynx [Normal Appearance] : normal appearance [No Neck Mass] : no neck mass [Normal Rate/Rhythm] : normal rate/rhythm [Normal S1, S2] : normal s1, s2 [No Murmurs] : no murmurs [No Resp Distress] : no resp distress [Clear to Auscultation Bilaterally] : clear to auscultation bilaterally [No Abnormalities] : no abnormalities [Benign] : benign [Normal Gait] : normal gait [No Clubbing] : no clubbing [No Cyanosis] : no cyanosis [No Edema] : no edema [FROM] : FROM [Normal Color/ Pigmentation] : normal color/ pigmentation [No Focal Deficits] : no focal deficits [Oriented x3] : oriented x3 [Normal Affect] : normal affect

## 2022-08-11 NOTE — FLOWSHEET NOTE
08/11/22 1740   Safe Environment   Safety Measures Other (comment)  (virtual safety round complete)   Virtual Nurse rounds, staff responded to audio that they were with the patient, camera not turned on

## 2022-08-11 NOTE — CONSULTS
[facility]    [address], [address]  [facility]  2022 7:18 PM    Pt Name:    Luciana Jones  MRN:     [id number]   [id number]  YOB: 1950  Admit Date:    8/10/2022  8:15 AM  Primary Care Physician:  Jose Allan    CSN Number:   [id number]    Reason for Consult:  Elevated serum creatinine  Requesting provider:  MICHAEL Chavez    History:   The patient is a 73 y.o. white female with history of diabetes, hypertension, systolic dysfunction with EF of 30 to 35% with subsequent improvement to 60% (2022), dyslipidemia. Patient was diagnosed with a pneumonia early this year and required tracheostomy. Patient was suffering from chronic shortness of breath and hoarseness. 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 Patient underwent laryngoplasty along with arytenoidectomy and advancement flap reconstruction. Nephrology was consulted due to elevated serum creatinine and need for PICC line. Her serum creatinine typically at baseline runs around 0.7-0.8. Today it is 1.1. Hence nephrology was consulted. Patient does have underlying diabetes. She has not had any kidney issues in the past.  Denies any nonsteroidal use.     Past Medical History:  Past Medical History:   Diagnosis Date    Arthritis     Coronary artery disease     COVID     Hyperlipidemia     Primary hypertension     Type 2 diabetes mellitus ([facility] [address]        Past Surgical History:  Past Surgical History:   Procedure Laterality Date    CARDIAC SURGERY      CATARACT REMOVAL Bilateral      SECTION      3 times    CORONARY ANGIOPLASTY WITH STENT PLACEMENT      stenting twice    EYE SURGERY      HIATAL HERNIA REPAIR      late     HYSTERECTOMY, TOTAL ABDOMINAL (CERVIX REMOVED)      in     LARYNGOSCOPY N/A 3/22/2022    SUSPENSON LARYNGOSCOPY WITH JET VENT, DILATION performed by Yary Levy Sathish Fontaine MD at [address] N/A 3/28/2022    SUSPENSION MICROLARYNGOSCOPY WITH BALLOON DILATION AND JET VENT, KENALOG INJECTION performed by Juan José Snider MD at [address]  N/A 4/7/2022    Suspension Laryngoscopy  Lateral of Right True Vocal Cord, With Steroid Injection of Larynx And Tracheostomy Tube Change, debridement performed by Emeterio Cronin MD at [address]      [address] 4/1/2022    TRACHEOTOMY TUBE REPLACEMENT performed by Juan José Snider MD at [address] History:  Family History   Problem Relation Age of Onset    Parkinson's Disease Father     Lung Cancer Father        Social History:  Social History     Socioeconomic History    Marital status:      Spouse name: bailee     Number of children: 3    Years of education: Not on file    Highest education level: Not on file   Occupational History    Not on file   Tobacco Use    Smoking status: Never    Smokeless tobacco: Never   Vaping Use    Vaping Use: Never used    Passive vaping exposure: Yes   Substance and Sexual Activity    Alcohol use: Not Currently     Comment: drinks 1 glass of wine cooler or wine about 3 times per year    Drug use: Never    Sexual activity: Not Currently   Other Topics Concern    Not on file   Social History Narrative    Not on file     Social Determinants of Health     Financial Resource Strain: Not on file   Food Insecurity: Not on file   Transportation Needs: Not on file   Physical Activity: Not on file   Stress: Not on file   Social Connections: Not on file   Intimate Partner Violence: Not on file   Housing Stability: Not on file       Home Meds:  Prior to Admission medications    Medication Sig Start Date End Date Taking?  Authorizing Provider   albuterol (ACCUNEB) 0.63 MG/3ML nebulizer solution Take 1 ampule by nebulization every 6 hours as needed for Wheezing   Yes Historical Provider, MD   lidocaine (XYLOCAINE) 4 % external solution Apply 1 Dose topically as needed for Pain Apply topically as needed. Historical Provider, MD   pseudoephedrine-guaiFENesin 120-1200 MG TB12 Take by mouth in the morning and at bedtime    Historical Provider, MD   Cholecalciferol (VITAMIN D3 PO) Take by mouth daily 4000 units    Historical Provider, MD   NONFORMULARY daily resveratrol    Historical Provider, MD   lisinopril (PRINIVIL;ZESTRIL) 10 MG tablet Take 1 tablet by mouth daily 6/8/22   Rowdy Wadsworth DO   guaiFENesin 400 MG tablet Take 400 mg by mouth 2 times daily as needed for Cough    Historical Provider, MD   LANTUS SOLOSTAR 100 UNIT/ML injection pen 20 units in PM 4/11/22   Historical Provider, MD   insulin NPH (HUMULIN N;NOVOLIN N) 100 UNIT/ML injection vial Inject into the skin 2 times daily (before meals) Takes BID on the SS    Historical Provider, MD   metoprolol succinate (TOPROL XL) 25 MG extended release tablet Take 1 tablet by mouth daily 4/8/22   Francheska Cristina MD   hydrOXYzine (VISTARIL) 25 MG capsule Take 1 capsule by mouth 4 times daily as needed for Anxiety 1/27/22   Nick Mckinnon MD   albuterol sulfate  (90 Base) MCG/ACT inhaler Inhale 2 puffs into the lungs every 6 hours as needed for Wheezing 1/27/22   Nick Mckinnon MD   furosemide (LASIX) 40 MG tablet Take 1 tablet by mouth daily 1/28/22   Nick Mckinnon MD   OXYGEN Inhale 2 L/min into the lungs continuous prn (during activities such as walking) 1/27/22   Nick Mckinnon MD   rosuvastatin (CRESTOR) 10 MG tablet Take 10 mg by mouth daily    Historical Provider, MD   nitroGLYCERIN (NITROSTAT) 0.4 MG SL tablet Place 0.4 mg under the tongue every 5 minutes as needed for Chest pain up to max of 3 total doses. If no relief after 1 dose, call 911.    Patient not taking: Reported on 7/12/2022    Historical Provider, MD   aspirin 81 MG chewable tablet Take 81 mg by mouth daily    Historical Provider, MD       Review of Systems:  Constitutional: Positive for hoarseness, chronic shortness of breath  Head: Negative for headaches  Eyes: Negative for blurry vision or discharge  Ears: Negative for ear pain or hearing changes  Nose: Negative for runny nose or epistaxis  Respiratory: Positive for chronic shortness of breath  Cardiovascular: Negative for chest pain  GI: Negative for nausea, vomiting and diarrhea. Negative for hematochezia and melena  : Negative for discharge, dysuria, or hematuria  Skin: Negative for rash  Musculoskeletal: Negative for joint pain, moves all ext    All other review of systems were reviewed and negative    Current Meds:  Infusion:    sodium chloride      sodium chloride 75 mL/hr at 08/11/22 1645    dextrose       Meds:    insulin lispro  10 Units SubCUTAneous TID WC    insulin glargine  30 Units SubCUTAneous Nightly    sodium chloride flush  5-40 mL IntraVENous 2 times per day    bacitracin   Topical Q8H    polyethylene glycol  17 g Oral Daily    enoxaparin  40 mg SubCUTAneous Daily    piperacillin-tazobactam  3,375 mg IntraVENous Q8H    rosuvastatin  10 mg Oral Daily    aspirin  81 mg Oral Daily    furosemide  40 mg Oral Daily    metoprolol succinate  25 mg Oral Daily    lisinopril  10 mg Oral Daily    pantoprazole  40 mg Oral BID AC    famotidine  40 mg Oral BID    predniSONE  15 mg Oral BID    insulin lispro  0-8 Units SubCUTAneous TID WC    insulin lispro  0-4 Units SubCUTAneous Nightly     Meds prn: sodium chloride flush, sodium chloride, ondansetron **OR** ondansetron, oxyCODONE **OR** oxyCODONE, HYDROmorphone **OR** HYDROmorphone, nitroGLYCERIN, hydrOXYzine pamoate, guaiFENesin, glucose, dextrose bolus **OR** dextrose bolus, glucagon (rDNA), dextrose     Allergies/Intolerances:   ALLERGIES: Metformin and related, Codeine, Meperidine hcl, Sulfa antibiotics, and Sumatriptan    24HR INTAKE/OUTPUT:    Intake/Output Summary (Last 24 hours) at 8/11/2022 1918  Last data filed at 8/11/2022 1840  Gross per 24 hour   Intake 2777.69 ml   Output 3500 ml   Net -722.31 ml     I/O last 3 completed shifts: In: 4714.1 [P.O.:1300; I.V.:3219.1; IV Piggyback:195]  Out: 3500 [Urine:3500]  No intake/output data recorded. Admission weight: 202 lb (91.6 kg)  Wt Readings from Last 3 Encounters:   08/11/22 206 lb 1.6 oz (93.5 kg)   07/07/22 196 lb 3.2 oz (89 kg)   06/29/22 198 lb (89.8 kg)     Body mass index is 37.7 kg/m². Physical Examination:  VITALS:   Vitals:    08/11/22 0314 08/11/22 0845 08/11/22 1214 08/11/22 1536   BP: (!) 137/92 135/85 (!) 161/77 (!) 149/88   Pulse: 73 75 76 78   Resp: 17 20 21 19   Temp: 97.8 °F (36.6 °C) 98 °F (36.7 °C) 98.5 °F (36.9 °C) 97.5 °F (36.4 °C)   TempSrc: Oral Oral Oral Axillary   SpO2: 93% 95% 100% 98%   Weight: 206 lb 1.6 oz (93.5 kg)      Height:         Weight:   Wt Readings from Last 3 Encounters:   08/11/22 206 lb 1.6 oz (93.5 kg)   07/07/22 196 lb 3.2 oz (89 kg)   06/29/22 198 lb (89.8 kg)     Constitutional and General Appearance: alert and cooperative with exam, appears comfortable, no distress, not diaphoretic  Eyes: no icteric sclera in left eye or right eye,  no pallor conjunctiva in left or right eye, no discharge seen from left eye or right eye  Oral: moist oral mucus membranes  Lungs: Air entry diminished, no use of accessory muscle use  Chest: No chest wall tenderness  Heart:  S1, S2  Extremities: No significant LE edema, no tenderness  GI: soft, non-tender, no guarding, no distention  Skin: no rash seen on exposed extremities  Musculo: moves all extremities  Neuro: no slurred speech, no facial drooping  Psychiatric: Normal mood and affect, Not agitated    Lab Data  CBC: No results for input(s): WBC, HGB, HCT, PLT in the last 72 hours. BMP:  Recent Labs     08/10/22  0849 08/11/22  0504   K 4.3  --    CREATININE  --  1.1     Hepatic: No results for input(s): LABALBU, AST, ALT, ALB, BILITOT, ALKPHOS in the last 72 hours. Old tests reviewed.    Echo: EF 60% March 2022  Labs: Baseline serum creatinine 0.6-0.8      Impression and Plan:  Mildly elevated serum creatinine. Baseline 0.6-0.8. Now 1.1. Likely due to hemodynamics and possible some element of ATN. Patient is status post surgery. Overall renal function is still very stable. Discussed with patient. Reassurance provided. We will simply obtain basic work-up including UA, urine protein creatinine ratio. No imaging studies are needed. Patient had CT of the abdomen with IV contrast in January which revealed normal enhancing kidneys without any hydronephrosis. Okay for PICC line from nephrology standpoint. Diabetes mellitus. Hospitalist managing  Tracheal stenosis/posterior glottic stenosis status post laryngoplasty  Hypertension  History of CAD    Thank you for the consult. Please feel free to call me if you have any questions. Dahlia Hough MD  [facility]    This report has been created using voice recognition software. It may contain minor errors which are inherent in voice recognition technology.

## 2022-08-11 NOTE — FLOWSHEET NOTE
08/11/22 1005   Safe Environment   Safety Measures Other (comment)  (virtual safety round complete)   Virtual Nurse introduced, pt denies needs at this time.

## 2022-08-11 NOTE — PROGRESS NOTES
Hospitalist Progress Note      Patient:  Marya Carrasco    Unit/Bed:4K-04/004-A  YOB: 1950  MRN: [id number]   Acct: [de-identified]   PCP: Deacon Gutierrez  Date of Admission: 8/10/2022    Assessment/Plan:    DMT2 with hyperglycemia  Hyperglycemia exacerbated by steroid use. Currently on prednisone 15mg BID  Will increase basal insulin to 30mg qhs and start scheduled mealtime insulin 10u TID. Continue SSI  Check A1c tomorrow AM  Posterior glottic stenosis  Chronic tracheostomy  POD 1 following transoral laryngoplasty with left partial arytenoidectomy and posterior lateralization advancement flap reconstruction with jet vent with ENT 2/2 prolonged intubation earlier this year from COVID-19 pneumonia  ENT to manage  HTN  Continue lisinopril and lasix. Hx CAD  On ASA/Statin  Chronic HFrEF  EF 30-35%. Recommend DC of IVF and continuation of lasix. HLD  Continue statin      Subjective (past 24 hours):   Patient is doing well this morning. She denies any trouble with breathing or neck pain. Denies fevers, or chills. States he BG has been poorly controlled since adjusting her dose of prednisone over the last several months. She is unable to give herself insulin injections, but allows her  to administer the injections. She is unsure what her current home dose is. Past medical history, family history, social history and allergies reviewed again and is unchanged since admission. ROS (12 point review of systems completed. Pertinent positives noted.  Otherwise ROS is negative)     Medications:  Reviewed    Infusion Medications    sodium chloride      sodium chloride 75 mL/hr at 08/11/22 1126    dextrose       Scheduled Medications    insulin lispro  10 Units SubCUTAneous TID WC    insulin glargine  30 Units SubCUTAneous Nightly    sodium chloride flush  5-40 mL IntraVENous 2 times per day    bacitracin   Topical Q8H    polyethylene glycol 17 g Oral Daily    enoxaparin  40 mg SubCUTAneous Daily    piperacillin-tazobactam  3,375 mg IntraVENous Q8H    rosuvastatin  10 mg Oral Daily    aspirin  81 mg Oral Daily    furosemide  40 mg Oral Daily    metoprolol succinate  25 mg Oral Daily    lisinopril  10 mg Oral Daily    pantoprazole  40 mg Oral BID AC    famotidine  40 mg Oral BID    predniSONE  15 mg Oral BID    insulin lispro  0-8 Units SubCUTAneous TID WC    insulin lispro  0-4 Units SubCUTAneous Nightly     PRN Meds: sodium chloride flush, sodium chloride, ondansetron **OR** ondansetron, oxyCODONE **OR** oxyCODONE, HYDROmorphone **OR** HYDROmorphone, nitroGLYCERIN, hydrOXYzine pamoate, guaiFENesin, glucose, dextrose bolus **OR** dextrose bolus, glucagon (rDNA), dextrose      Intake/Output Summary (Last 24 hours) at 8/11/2022 1241  Last data filed at 8/11/2022 1126  Gross per 24 hour   Intake 2512.52 ml   Output 2450 ml   Net 62.52 ml       Diet:  ADULT DIET; Regular; 4 carb choices (60 gm/meal)    Exam:  BP (!) 161/77   Pulse 76   Temp 98.5 °F (36.9 °C) (Oral)   Resp 21   Ht 5' 2\" (1.575 m)   Wt 206 lb 1.6 oz (93.5 kg)   LMP  (LMP Unknown)   SpO2 100%   BMI 37.70 kg/m²   General appearance: No apparent distress, appears stated age and cooperative. HEENT: Pupils equal, round, and reactive to light. Conjunctivae/corneas clear. Neck: Tracheostomy in the midline. No surrounding drainage or erythema. Trach collar in place. Respiratory:  Normal respiratory effort. Clear to auscultation, bilaterally without Rales/Wheezes/Rhonchi. Cardiovascular: Regular rate and rhythm with normal S1/S2 without murmurs, rubs or gallops. Abdomen: Soft, non-tender, non-distended with normal bowel sounds. Musculoskeletal: passive and active ROM x 4 extremities. Skin: Skin color, texture, turgor normal.  No rashes or lesions. Neurologic:  Neurovascularly intact without any focal sensory/motor deficits.  Cranial nerves: II-XII intact, grossly non-focal.  Psychiatric: Alert and oriented, thought content appropriate, normal insight  Capillary Refill: Brisk,< 3 seconds   Peripheral Pulses: +2 palpable, equal bilaterally     Labs:   No results for input(s): WBC, HGB, HCT, PLT in the last 72 hours. Recent Labs     08/10/22  0849 08/11/22  0504   K 4.3  --    CREATININE  --  1.1     No results for input(s): AST, ALT, BILIDIR, BILITOT, ALKPHOS in the last 72 hours. No results for input(s): INR in the last 72 hours. No results for input(s): Tea Gazella in the last 72 hours. Microbiology:    Blood culture #1:   Lab Results   Component Value Date/Time    BC No growth-preliminary  01/02/2022 03:29 PM    BC  01/02/2022 03:29 PM     possible contamination; clinical correlation required     BC No growth-preliminary No growth  01/02/2022 03:29 PM       Blood culture #2:No results found for: Galileous Risk    Organism:  Lab Results   Component Value Date/Time    ORG gram positive bacilli 01/10/2022 01:30 PM         Lab Results   Component Value Date/Time    LABGRAM  03/28/2022 12:53 PM     Rare segmented neutrophils observed. No epithelial cells observed. No bacteria seen. MRSA culture only:No results found for: [facility]    Urine culture:   Lab Results   Component Value Date/Time    LABURIN No growth-preliminary No growth  12/20/2021 08:57 PM       Respiratory culture: No results found for: CULTRESP    Aerobic and Anaerobic :  Lab Results   Component Value Date/Time    LABAERO Normal john  03/28/2022 12:53 PM     No results found for: LABANAE    Urinalysis:      Lab Results   Component Value Date/Time    NITRU NEGATIVE 01/10/2022 01:30 PM    WBCUA 15-25 01/10/2022 01:30 PM    BACTERIA NONE 01/10/2022 01:30 PM    RBCUA > 200 01/10/2022 01:30 PM    BLOODU LARGE 01/10/2022 01:30 PM    SPECGRAV 1.026 12/31/2021 09:45 AM    GLUCOSEU NEGATIVE 01/10/2022 01:30 PM       Radiology:  XR CHEST PORTABLE   Final Result   1.  The tracheostomy tube terminates 3.2 cm above the telma just above the level of the clavicles. 2. There is now increased interstitial markings that may be postprocedural or due to the low lung volumes. 3. Marked cardiomegaly. 4. No pneumothorax. **This report has been created using voice recognition software. It may contain minor errors which are inherent in voice recognition technology. **      Final report electronically signed by Dr Tien Max on 8/10/2022 3:05 PM        XR CHEST PORTABLE    Result Date: 8/10/2022  PROCEDURE: XR CHEST PORTABLE CLINICAL INFORMATION: jet vent COMPARISON: Chest radiograph 6/6/2022 TECHNIQUE: AP portable chest radiograph performed. 1. The tracheostomy tube terminates 3.2 cm above the telma just above the level of the clavicles. 2. There is now increased interstitial markings that may be postprocedural or due to the low lung volumes. 3. Marked cardiomegaly. 4. No pneumothorax. **This report has been created using voice recognition software. It may contain minor errors which are inherent in voice recognition technology. ** Final report electronically signed by Dr Tien Max on 8/10/2022 3:05 PM      Electronically signed by Cassandra Juarez DO on 8/11/2022 at 12:41 PM

## 2022-08-12 LAB
ANION GAP SERPL CALCULATED.3IONS-SCNC: 10 MEQ/L (ref 8–16)
AVERAGE GLUCOSE: 192 MG/DL (ref 70–126)
BILIRUBIN URINE: NEGATIVE
BLOOD, URINE: NEGATIVE
BUN BLDV-MCNC: 29 MG/DL (ref 7–22)
CALCIUM SERPL-MCNC: 9.1 MG/DL (ref 8.5–10.5)
CHARACTER, URINE: CLEAR
CHLORIDE BLD-SCNC: 102 MEQ/L (ref 98–111)
CO2: 23 MEQ/L (ref 23–33)
COLOR: YELLOW
CREAT SERPL-MCNC: 1 MG/DL (ref 0.4–1.2)
GFR SERPL CREATININE-BSD FRML MDRD: 55 ML/MIN/1.73M2
GLUCOSE BLD-MCNC: 218 MG/DL (ref 70–108)
GLUCOSE BLD-MCNC: 228 MG/DL (ref 70–108)
GLUCOSE BLD-MCNC: 333 MG/DL (ref 70–108)
GLUCOSE BLD-MCNC: 339 MG/DL (ref 70–108)
GLUCOSE BLD-MCNC: 347 MG/DL (ref 70–108)
GLUCOSE, URINE: 500 MG/DL
HBA1C MFR BLD: 8.4 % (ref 4.4–6.4)
KETONES, URINE: NEGATIVE
LEUKOCYTE EST, POC: NEGATIVE
NITRITE, URINE: NEGATIVE
PH UA: 5.5 (ref 5–9)
POTASSIUM SERPL-SCNC: 4.4 MEQ/L (ref 3.5–5.2)
PROTEIN UA: NEGATIVE MG/DL
SODIUM BLD-SCNC: 135 MEQ/L (ref 135–145)
SPECIFIC GRAVITY UA: 1.01 (ref 1–1.03)
UROBILINOGEN, URINE: 0.2 EU/DL (ref 0–1)

## 2022-08-12 PROCEDURE — 99024 POSTOP FOLLOW-UP VISIT: CPT | Performed by: PHYSICIAN ASSISTANT

## 2022-08-12 PROCEDURE — 6370000000 HC RX 637 (ALT 250 FOR IP): Performed by: STUDENT IN AN ORGANIZED HEALTH CARE EDUCATION/TRAINING PROGRAM

## 2022-08-12 PROCEDURE — 99233 SBSQ HOSP IP/OBS HIGH 50: CPT | Performed by: STUDENT IN AN ORGANIZED HEALTH CARE EDUCATION/TRAINING PROGRAM

## 2022-08-12 PROCEDURE — 82948 REAGENT STRIP/BLOOD GLUCOSE: CPT

## 2022-08-12 PROCEDURE — 99232 SBSQ HOSP IP/OBS MODERATE 35: CPT | Performed by: INTERNAL MEDICINE

## 2022-08-12 PROCEDURE — 80048 BASIC METABOLIC PNL TOTAL CA: CPT

## 2022-08-12 PROCEDURE — 2580000003 HC RX 258: Performed by: OTOLARYNGOLOGY

## 2022-08-12 PROCEDURE — 2060000000 HC ICU INTERMEDIATE R&B

## 2022-08-12 PROCEDURE — 81003 URINALYSIS AUTO W/O SCOPE: CPT

## 2022-08-12 PROCEDURE — 83036 HEMOGLOBIN GLYCOSYLATED A1C: CPT

## 2022-08-12 PROCEDURE — 6370000000 HC RX 637 (ALT 250 FOR IP): Performed by: OTOLARYNGOLOGY

## 2022-08-12 PROCEDURE — 6360000002 HC RX W HCPCS: Performed by: OTOLARYNGOLOGY

## 2022-08-12 RX ORDER — INSULIN LISPRO 100 [IU]/ML
0-4 INJECTION, SOLUTION INTRAVENOUS; SUBCUTANEOUS NIGHTLY
Status: DISCONTINUED | OUTPATIENT
Start: 2022-08-12 | End: 2022-08-14

## 2022-08-12 RX ORDER — INSULIN LISPRO 100 [IU]/ML
0-16 INJECTION, SOLUTION INTRAVENOUS; SUBCUTANEOUS
Status: DISCONTINUED | OUTPATIENT
Start: 2022-08-12 | End: 2022-08-14

## 2022-08-12 RX ORDER — INSULIN GLARGINE 100 [IU]/ML
40 INJECTION, SOLUTION SUBCUTANEOUS NIGHTLY
Status: DISCONTINUED | OUTPATIENT
Start: 2022-08-12 | End: 2022-08-13

## 2022-08-12 RX ORDER — INSULIN LISPRO 100 [IU]/ML
15 INJECTION, SOLUTION INTRAVENOUS; SUBCUTANEOUS
Status: DISCONTINUED | OUTPATIENT
Start: 2022-08-12 | End: 2022-08-13

## 2022-08-12 RX ADMIN — INSULIN LISPRO 4 UNITS: 100 INJECTION, SOLUTION INTRAVENOUS; SUBCUTANEOUS at 17:21

## 2022-08-12 RX ADMIN — LISINOPRIL 10 MG: 10 TABLET ORAL at 08:43

## 2022-08-12 RX ADMIN — SODIUM CHLORIDE, PRESERVATIVE FREE 10 ML: 5 INJECTION INTRAVENOUS at 20:33

## 2022-08-12 RX ADMIN — FAMOTIDINE 40 MG: 20 TABLET ORAL at 08:37

## 2022-08-12 RX ADMIN — INSULIN LISPRO 15 UNITS: 100 INJECTION, SOLUTION INTRAVENOUS; SUBCUTANEOUS at 08:38

## 2022-08-12 RX ADMIN — BACITRACIN: 500 OINTMENT TOPICAL at 08:39

## 2022-08-12 RX ADMIN — PANTOPRAZOLE SODIUM 40 MG: 40 TABLET, DELAYED RELEASE ORAL at 08:37

## 2022-08-12 RX ADMIN — BACITRACIN: 500 OINTMENT TOPICAL at 16:01

## 2022-08-12 RX ADMIN — INSULIN LISPRO 15 UNITS: 100 INJECTION, SOLUTION INTRAVENOUS; SUBCUTANEOUS at 17:21

## 2022-08-12 RX ADMIN — BACITRACIN: 500 OINTMENT TOPICAL at 00:03

## 2022-08-12 RX ADMIN — FAMOTIDINE 40 MG: 20 TABLET ORAL at 20:32

## 2022-08-12 RX ADMIN — PIPERACILLIN AND TAZOBACTAM 3375 MG: 3; .375 INJECTION, POWDER, FOR SOLUTION INTRAVENOUS at 00:07

## 2022-08-12 RX ADMIN — PIPERACILLIN AND TAZOBACTAM 3375 MG: 3; .375 INJECTION, POWDER, FOR SOLUTION INTRAVENOUS at 16:02

## 2022-08-12 RX ADMIN — PREDNISONE 15 MG: 10 TABLET ORAL at 20:32

## 2022-08-12 RX ADMIN — INSULIN LISPRO 12 UNITS: 100 INJECTION, SOLUTION INTRAVENOUS; SUBCUTANEOUS at 12:45

## 2022-08-12 RX ADMIN — INSULIN LISPRO 6 UNITS: 100 INJECTION, SOLUTION INTRAVENOUS; SUBCUTANEOUS at 08:38

## 2022-08-12 RX ADMIN — FUROSEMIDE 40 MG: 40 TABLET ORAL at 08:37

## 2022-08-12 RX ADMIN — PIPERACILLIN AND TAZOBACTAM 3375 MG: 3; .375 INJECTION, POWDER, FOR SOLUTION INTRAVENOUS at 09:47

## 2022-08-12 RX ADMIN — GUAIFENESIN 400 MG: 200 SOLUTION ORAL at 08:51

## 2022-08-12 RX ADMIN — ROSUVASTATIN 10 MG: 10 TABLET, FILM COATED ORAL at 08:37

## 2022-08-12 RX ADMIN — PREDNISONE 15 MG: 10 TABLET ORAL at 08:37

## 2022-08-12 RX ADMIN — INSULIN GLARGINE 40 UNITS: 100 INJECTION, SOLUTION SUBCUTANEOUS at 20:32

## 2022-08-12 RX ADMIN — ASPIRIN 81 MG 81 MG: 81 TABLET ORAL at 08:37

## 2022-08-12 RX ADMIN — BACITRACIN: 500 OINTMENT TOPICAL at 23:25

## 2022-08-12 RX ADMIN — GUAIFENESIN 400 MG: 200 SOLUTION ORAL at 20:44

## 2022-08-12 RX ADMIN — SODIUM CHLORIDE, PRESERVATIVE FREE 10 ML: 5 INJECTION INTRAVENOUS at 08:39

## 2022-08-12 RX ADMIN — METOPROLOL SUCCINATE 25 MG: 25 TABLET, EXTENDED RELEASE ORAL at 08:43

## 2022-08-12 RX ADMIN — INSULIN LISPRO 15 UNITS: 100 INJECTION, SOLUTION INTRAVENOUS; SUBCUTANEOUS at 12:23

## 2022-08-12 RX ADMIN — ENOXAPARIN SODIUM 40 MG: 100 INJECTION SUBCUTANEOUS at 08:39

## 2022-08-12 RX ADMIN — PANTOPRAZOLE SODIUM 40 MG: 40 TABLET, DELAYED RELEASE ORAL at 16:05

## 2022-08-12 ASSESSMENT — PAIN SCALES - GENERAL
PAINLEVEL_OUTOF10: 0

## 2022-08-12 NOTE — FLOWSHEET NOTE
08/12/22 1007   Safe Environment   Safety Measures   (virtual safety rounds complete)   Virtual RN rounds, patient denies needs at this time, family at the bedside, call light within reach

## 2022-08-12 NOTE — PLAN OF CARE
Problem: Discharge Planning  Goal: Discharge to home or other facility with appropriate resources  8/12/2022 1429 by Rosie Cabrera RN  Outcome: Progressing  Flowsheets (Taken 8/12/2022 7879)  Discharge to home or other facility with appropriate resources: Identify barriers to discharge with patient and caregiver  8/12/2022 0123 by Mirian Garrido RN  Outcome: Progressing  Flowsheets (Taken 8/12/2022 0123)  Discharge to home or other facility with appropriate resources:   Identify barriers to discharge with patient and caregiver   Identify discharge learning needs (meds, wound care, etc)     Problem: Chronic Conditions and Co-morbidities  Goal: Patient's chronic conditions and co-morbidity symptoms are monitored and maintained or improved  8/12/2022 1429 by Rosie Cabrera RN  Outcome: Progressing  Flowsheets (Taken 8/12/2022 1733)  Care Plan - Patient's Chronic Conditions and Co-Morbidity Symptoms are Monitored and Maintained or Improved: Monitor and assess patient's chronic conditions and comorbid symptoms for stability, deterioration, or improvement  8/12/2022 0123 by Mirian Garrido RN  Outcome: Progressing  Flowsheets (Taken 8/12/2022 0123)  Care Plan - Patient's Chronic Conditions and Co-Morbidity Symptoms are Monitored and Maintained or Improved:   Monitor and assess patient's chronic conditions and comorbid symptoms for stability, deterioration, or improvement   Collaborate with multidisciplinary team to address chronic and comorbid conditions and prevent exacerbation or deterioration   Update acute care plan with appropriate goals if chronic or comorbid symptoms are exacerbated and prevent overall improvement and discharge     Problem: Pain  Goal: Verbalizes/displays adequate comfort level or baseline comfort level  8/12/2022 1429 by Rosie Cabrera RN  Outcome: Progressing  Flowsheets (Taken 8/12/2022 9200)  Verbalizes/displays adequate comfort level or baseline comfort level: Encourage patient to monitor pain and request assistance  8/12/2022 0123 by Jose Maria Hathaway RN  Outcome: Progressing  Flowsheets (Taken 8/12/2022 0123)  Verbalizes/displays adequate comfort level or baseline comfort level:   Encourage patient to monitor pain and request assistance   Assess pain using appropriate pain scale   Implement non-pharmacological measures as appropriate and evaluate response     Problem: Safety - Adult  Goal: Free from fall injury  8/12/2022 1429 by Thad Sahu RN  Outcome: Progressing  Flowsheets (Taken 8/12/2022 1428)  Free From Fall Injury: Instruct family/caregiver on patient safety  8/12/2022 0123 by Jose Maria Hathaway RN  Outcome: Progressing  Flowsheets (Taken 8/12/2022 0123)  Free From Fall Injury: Instruct family/caregiver on patient safety  Note: Patient free from falls this shift. Bed alarm in use.      Problem: ABCDS Injury Assessment  Goal: Absence of physical injury  8/12/2022 1429 by Thad Sahu RN  Outcome: Progressing  Flowsheets (Taken 8/12/2022 0123 by Jose Maria Hathaway RN)  Absence of Physical Injury: Implement safety measures based on patient assessment  8/12/2022 0123 by Jose Maria Hathaway RN  Outcome: Progressing  Flowsheets (Taken 8/12/2022 0123)  Absence of Physical Injury: Implement safety measures based on patient assessment     Problem: Respiratory - Adult  Goal: Achieves optimal ventilation and oxygenation  8/12/2022 1429 by Thad Sahu RN  Outcome: Progressing  Flowsheets (Taken 8/12/2022 8179)  Achieves optimal ventilation and oxygenation: Assess for changes in respiratory status  8/12/2022 0123 by Jose Maria Hathaway RN  Outcome: Progressing  Flowsheets (Taken 8/12/2022 0123)  Achieves optimal ventilation and oxygenation:   Assess for changes in respiratory status   Assess for changes in mentation and behavior   Position to facilitate oxygenation and minimize respiratory effort   Assess and instruct to report shortness of breath or any respiratory difficulty       Care plan reviewed with patient. Patient does verbalize understanding of the plan of care and contribute to goal setting.

## 2022-08-12 NOTE — FLOWSHEET NOTE
08/12/22 1422   Safe Environment   Safety Measures   (virtual safety rounds complete)   Virtual nurse rounds, pt is non verbal due to trach. Denies any needs. No safety concerns identified. Call light within reach. Will continue to monitor.

## 2022-08-12 NOTE — PROGRESS NOTES
Hospitalist Progress Note      Patient:  Shannon Chou    Unit/Bed:4K-04/004-A  YOB: 1950  MRN: [id number]   Acct: [de-identified]   PCP: Thu Bucio  Date of Admission: 8/10/2022    Assessment/Plan:    DMT2 with hyperglycemia  Hyperglycemia exacerbated by steroid use. Currently on prednisone 15mg BID  BG persistently high, although improved from yesterday. Will increase basal again to 40u daily and increase mealtime to 15u TID AC. Change SSI to high dose from medium dose. Will need mealtime insulin at home at time of discharge. She does use a CGM. Posterior glottic stenosis  Chronic tracheostomy  POD 2 following transoral laryngoplasty with left partial arytenoidectomy and posterior lateralization advancement flap reconstruction with jet vent with ENT 2/2 prolonged intubation earlier this year from COVID-19 pneumonia  ENT to manage  HTN  Continue lisinopril and lasix. Hx CAD  On ASA/Statin  Chronic HFrEF  EF 30-35%. Recommend DC of IVF and continuation of lasix. HLD  Continue statin      Subjective (past 24 hours):   Doing well. Denies any significant neck pain or edema. Breathing is normal.  She is able to speak with a finger over her tracheostomy. She appears in good spirits. Past medical history, family history, social history and allergies reviewed again and is unchanged since admission. ROS (12 point review of systems completed. Pertinent positives noted.  Otherwise ROS is negative)     Medications:  Reviewed    Infusion Medications    sodium chloride      sodium chloride 75 mL/hr at 08/11/22 1645    dextrose       Scheduled Medications    insulin lispro  15 Units SubCUTAneous TID WC    insulin glargine  40 Units SubCUTAneous Nightly    sodium chloride flush  5-40 mL IntraVENous 2 times per day    bacitracin   Topical Q8H    polyethylene glycol  17 g Oral Daily    enoxaparin  40 mg SubCUTAneous Daily piperacillin-tazobactam  3,375 mg IntraVENous Q8H    rosuvastatin  10 mg Oral Daily    aspirin  81 mg Oral Daily    furosemide  40 mg Oral Daily    metoprolol succinate  25 mg Oral Daily    lisinopril  10 mg Oral Daily    pantoprazole  40 mg Oral BID AC    famotidine  40 mg Oral BID    predniSONE  15 mg Oral BID    insulin lispro  0-8 Units SubCUTAneous TID WC    insulin lispro  0-4 Units SubCUTAneous Nightly     PRN Meds: sodium chloride flush, sodium chloride, ondansetron **OR** ondansetron, oxyCODONE **OR** oxyCODONE, HYDROmorphone **OR** HYDROmorphone, nitroGLYCERIN, hydrOXYzine pamoate, guaiFENesin, glucose, dextrose bolus **OR** dextrose bolus, glucagon (rDNA), dextrose      Intake/Output Summary (Last 24 hours) at 8/12/2022 1220  Last data filed at 8/12/2022 1216  Gross per 24 hour   Intake 1325.17 ml   Output 2800 ml   Net -1474.83 ml         Diet:  ADULT DIET; Regular; 4 carb choices (60 gm/meal)    Exam:  /72   Pulse 64   Temp 98.7 °F (37.1 °C) (Oral)   Resp 18   Ht 5' 2\" (1.575 m)   Wt 205 lb (93 kg)   LMP  (LMP Unknown)   SpO2 100%   BMI 37.49 kg/m²   General appearance: No apparent distress, appears stated age and cooperative. HEENT: Pupils equal, round, and reactive to light. Conjunctivae/corneas clear. Neck: Tracheostomy in the midline. No surrounding drainage or erythema. Trach collar in place. Respiratory:  Normal respiratory effort. Clear to auscultation, bilaterally without Rales/Wheezes/Rhonchi. Cardiovascular: Regular rate and rhythm with normal S1/S2 without murmurs, rubs or gallops. Abdomen: Soft, non-tender, non-distended with normal bowel sounds. Musculoskeletal: passive and active ROM x 4 extremities. Skin: Skin color, texture, turgor normal.  No rashes or lesions. Neurologic:  Neurovascularly intact without any focal sensory/motor deficits.  Cranial nerves: II-XII intact, grossly non-focal.  Psychiatric: Alert and oriented, thought content appropriate, normal insight  Capillary Refill: Brisk,< 3 seconds   Peripheral Pulses: +2 palpable, equal bilaterally     Labs:   No results for input(s): WBC, HGB, HCT, PLT in the last 72 hours. Recent Labs     08/10/22  0849 08/11/22  0504 08/12/22  0520   NA  --   --  135   K 4.3  --  4.4   CL  --   --  102   CO2  --   --  23   BUN  --   --  29*   CREATININE  --  1.1 1.0   CALCIUM  --   --  9.1       No results for input(s): AST, ALT, BILIDIR, BILITOT, ALKPHOS in the last 72 hours. No results for input(s): INR in the last 72 hours. No results for input(s): Medina Comber in the last 72 hours. Microbiology:    Blood culture #1:   Lab Results   Component Value Date/Time    BC No growth-preliminary  01/02/2022 03:29 PM    BC  01/02/2022 03:29 PM     possible contamination; clinical correlation required     BC No growth-preliminary No growth  01/02/2022 03:29 PM       Blood culture #2:No results found for: Ahmet Hayward    Organism:  Lab Results   Component Value Date/Time    ORG gram positive bacilli 01/10/2022 01:30 PM         Lab Results   Component Value Date/Time    LABGRAM  03/28/2022 12:53 PM     Rare segmented neutrophils observed. No epithelial cells observed. No bacteria seen. MRSA culture only:No results found for: [facility]    Urine culture:   Lab Results   Component Value Date/Time    LABURIN No growth-preliminary No growth  12/20/2021 08:57 PM       Respiratory culture: No results found for: CULTRESP    Aerobic and Anaerobic :  Lab Results   Component Value Date/Time    LABAERO Normal john  03/28/2022 12:53 PM     No results found for: LABANAE    Urinalysis:      Lab Results   Component Value Date/Time    NITRU NEGATIVE 01/10/2022 01:30 PM    WBCUA 15-25 01/10/2022 01:30 PM    BACTERIA NONE 01/10/2022 01:30 PM    RBCUA > 200 01/10/2022 01:30 PM    BLOODU LARGE 01/10/2022 01:30 PM    SPECGRAV 1.026 12/31/2021 09:45 AM    GLUCOSEU NEGATIVE 01/10/2022 01:30 PM       Radiology:  XR CHEST PORTABLE   Final Result   1. The left PICC line has its tip overlying the region of the superior vena cava. 2. The tracheostomy tube terminates 5.6 cm above the telma at the level of the clavicles. 3. Mild cardiomegaly. 4. No focal pulmonary infiltrate. 5. Otherwise, there has been no other significant interval change in the radiographic appearance of the chest  from 8/10/2022 at 1245 hours. **This report has been created using voice recognition software. It may contain minor errors which are inherent in voice recognition technology. **      Final report electronically signed by Dr Princess Rocha on 8/11/2022 3:11 PM      XR CHEST PORTABLE   Final Result   1. The tracheostomy tube terminates 3.2 cm above the telma just above the level of the clavicles. 2. There is now increased interstitial markings that may be postprocedural or due to the low lung volumes. 3. Marked cardiomegaly. 4. No pneumothorax. **This report has been created using voice recognition software. It may contain minor errors which are inherent in voice recognition technology. **      Final report electronically signed by Dr Princess Rocha on 8/10/2022 3:05 PM        XR CHEST PORTABLE    Result Date: 8/10/2022  PROCEDURE: XR CHEST PORTABLE CLINICAL INFORMATION: jet vent COMPARISON: Chest radiograph 6/6/2022 TECHNIQUE: AP portable chest radiograph performed. 1. The tracheostomy tube terminates 3.2 cm above the telma just above the level of the clavicles. 2. There is now increased interstitial markings that may be postprocedural or due to the low lung volumes. 3. Marked cardiomegaly. 4. No pneumothorax. **This report has been created using voice recognition software. It may contain minor errors which are inherent in voice recognition technology. ** Final report electronically signed by Dr Princess Rocha on 8/10/2022 3:05 PM      Electronically signed by Barbara Alcantar DO on 8/12/2022 at 12:20 PM

## 2022-08-12 NOTE — PLAN OF CARE
Problem: Discharge Planning  Goal: Discharge to home or other facility with appropriate resources  8/12/2022 0123 by Samuel Wong RN  Outcome: Progressing  Flowsheets (Taken 8/12/2022 0123)  Discharge to home or other facility with appropriate resources:   Identify barriers to discharge with patient and caregiver   Identify discharge learning needs (meds, wound care, etc)     Problem: Chronic Conditions and Co-morbidities  Goal: Patient's chronic conditions and co-morbidity symptoms are monitored and maintained or improved  8/12/2022 0123 by Samuel Wong RN  Outcome: Progressing  Flowsheets (Taken 8/12/2022 0123)  Care Plan - Patient's Chronic Conditions and Co-Morbidity Symptoms are Monitored and Maintained or Improved:   Monitor and assess patient's chronic conditions and comorbid symptoms for stability, deterioration, or improvement   Collaborate with multidisciplinary team to address chronic and comorbid conditions and prevent exacerbation or deterioration   Update acute care plan with appropriate goals if chronic or comorbid symptoms are exacerbated and prevent overall improvement and discharge     Problem: Pain  Goal: Verbalizes/displays adequate comfort level or baseline comfort level  8/12/2022 0123 by Samuel Wong RN  Outcome: Progressing  Flowsheets (Taken 8/12/2022 0123)  Verbalizes/displays adequate comfort level or baseline comfort level:   Encourage patient to monitor pain and request assistance   Assess pain using appropriate pain scale   Implement non-pharmacological measures as appropriate and evaluate response     Problem: Safety - Adult  Goal: Free from fall injury  8/12/2022 0123 by Samuel Wong RN  Outcome: Progressing  Flowsheets (Taken 8/12/2022 0123)  Free From Fall Injury: Instruct family/caregiver on patient safety  Note: Patient free from falls this shift. Bed alarm in use.      Problem: ABCDS Injury Assessment  Goal: Absence of physical injury  8/12/2022 0123 by Stone Menendez YVETTE Iverson  Outcome: Progressing  Flowsheets (Taken 8/12/2022 0123)  Absence of Physical Injury: Implement safety measures based on patient assessment     Problem: Respiratory - Adult  Goal: Achieves optimal ventilation and oxygenation  Outcome: Progressing  Flowsheets (Taken 8/12/2022 0123)  Achieves optimal ventilation and oxygenation:   Assess for changes in respiratory status   Assess for changes in mentation and behavior   Position to facilitate oxygenation and minimize respiratory effort   Assess and instruct to report shortness of breath or any respiratory difficulty   Care plan reviewed with patient and family. Patient and family verbalize understanding of the plan of care and contribute to goal setting.

## 2022-08-12 NOTE — PROGRESS NOTES
Department of Otolaryngology  Progress Note    Chief Complaint:  POD #2 Suspension laryngoscopy and therapeutic bronchoscopy with dilation debridement and resection of obstructing soft tissues in both the larynx and trachea; resection of peristomal superficial granulation tissue    SUBJECTIVE: Patient continues to report she is doing well today. She reports that she is breathing well and is able to occlude her tracheostomy with her finger to vocalize. Her blood glucose remains elevated and hospitalist is working to improve glucose control. Hemoglobin A1c this a.m. was 8.4 and most recent glucose at 1118 was 333. She reports minimal pain. She denies any bright red blood from her trach, but does have some pink-tinged mucus on the inner cannula. REVIEW OF SYSTEMS:    A complete multi-organ review of systems was performed and reviewed by me. ENT:  negative except as noted in HPI  CONSTITUTIONAL:  negative except as noted in HPI  EYES:  negative except as noted in HPI  RESPIRATORY:  negative except as noted in HPI  CARDIOVASCULAR:  negative except as noted in HPI  GASTROINTESTINAL:  negative except as noted in HPI  GENITOURINARY:  negative except as noted in HPI  MUSCULOSKELETAL:  negative except as noted in HPI  SKIN:  negative except as noted in HPI  ENDOCRINE/METABOLIC: negative except as noted in HPI  HEMATOLOGIC/LYMPHATIC:  negative except as noted in HPI  ALLERGY/IMMUN: negative except as noted in HPI  NEUROLOGICAL:  negative except as noted in HPI  BEHAVIOR/PSYCH:  negative except as noted in HPI    OBJECTIVE      Physical  VITALS:  /72   Pulse 64   Temp 98.7 °F (37.1 °C) (Oral)   Resp 18   Ht 5' 2\" (1.575 m)   Wt 205 lb (93 kg)   LMP  (LMP Unknown)   SpO2 100%   BMI 37.49 kg/m²     This is a 73 y.o. female. Patient is alert and oriented to person, place and time. Patient appears well developed, well nourished. Mood is happy with normal affect. Not obviously hearing impaired.   Patient is able to vocalize with finger occlusion of her tracheostomy     Patient resting comfortably in hospital bed upon my arrival.  Her breathing is unlabored without evidence of respiratory distress. Trach mask in place. No palpable neck edema or crepitus is noted. No induration or fluctuance concerning for seroma/hematoma. Inner cannula of the tracheostomy was removed which had a scant amount of pink-tinged mucus. No bloody discharge in oropharynx.     Data  BMP:    Lab Results   Component Value Date/Time     08/12/2022 05:20 AM    K 4.4 08/12/2022 05:20 AM    K 4.3 08/10/2022 08:49 AM     08/12/2022 05:20 AM    CO2 23 08/12/2022 05:20 AM    BUN 29 08/12/2022 05:20 AM    LABALBU 3.9 06/06/2022 05:20 PM    CREATININE 1.0 08/12/2022 05:20 AM    CALCIUM 9.1 08/12/2022 05:20 AM    LABGLOM 55 08/12/2022 05:20 AM    GLUCOSE 339 08/12/2022 05:20 AM       Inpatient Medications  Current Facility-Administered Medications: insulin lispro (HUMALOG) injection vial 15 Units, 15 Units, SubCUTAneous, TID WC  insulin glargine (LANTUS) injection vial 40 Units, 40 Units, SubCUTAneous, Nightly  sodium chloride flush 0.9 % injection 5-40 mL, 5-40 mL, IntraVENous, 2 times per day  sodium chloride flush 0.9 % injection 5-40 mL, 5-40 mL, IntraVENous, PRN  0.9 % sodium chloride infusion, , IntraVENous, PRN  ondansetron (ZOFRAN-ODT) disintegrating tablet 4 mg, 4 mg, Oral, Q8H PRN **OR** ondansetron (ZOFRAN) injection 4 mg, 4 mg, IntraVENous, Q6H PRN  bacitracin ointment, , Topical, Q8H  0.45 % sodium chloride infusion, , IntraVENous, Continuous  oxyCODONE (ROXICODONE) immediate release tablet 5 mg, 5 mg, Oral, Q4H PRN **OR** oxyCODONE (ROXICODONE) immediate release tablet 10 mg, 10 mg, Oral, Q4H PRN  HYDROmorphone (DILAUDID) injection 0.25 mg, 0.25 mg, IntraVENous, Q3H PRN **OR** HYDROmorphone (DILAUDID) injection 0.5 mg, 0.5 mg, IntraVENous, Q3H PRN  polyethylene glycol (GLYCOLAX) packet 17 g, 17 g, Oral, Daily  enoxaparin (LOVENOX) injection 40 mg, 40 mg, SubCUTAneous, Daily  piperacillin-tazobactam (ZOSYN) 3,375 mg in dextrose 5 % 50 mL IVPB extended infusion (mini-bag), 3,375 mg, IntraVENous, Q8H  rosuvastatin (CRESTOR) tablet 10 mg, 10 mg, Oral, Daily  nitroGLYCERIN (NITROSTAT) SL tablet 0.4 mg, 0.4 mg, SubLINGual, Q5 Min PRN  aspirin chewable tablet 81 mg, 81 mg, Oral, Daily  hydrOXYzine pamoate (VISTARIL) capsule 25 mg, 25 mg, Oral, 4x Daily PRN  furosemide (LASIX) tablet 40 mg, 40 mg, Oral, Daily  metoprolol succinate (TOPROL XL) extended release tablet 25 mg, 25 mg, Oral, Daily  lisinopril (PRINIVIL;ZESTRIL) tablet 10 mg, 10 mg, Oral, Daily  pantoprazole (PROTONIX) tablet 40 mg, 40 mg, Oral, BID AC  famotidine (PEPCID) tablet 40 mg, 40 mg, Oral, BID  predniSONE (DELTASONE) tablet 15 mg, 15 mg, Oral, BID  guaiFENesin (ROBITUSSIN) 100 MG/5ML oral solution 400 mg, 400 mg, Oral, BID PRN  glucose chewable tablet 16 g, 4 tablet, Oral, PRN  dextrose bolus 10% 125 mL, 125 mL, IntraVENous, PRN **OR** dextrose bolus 10% 250 mL, 250 mL, IntraVENous, PRN  glucagon (rDNA) injection 1 mg, 1 mg, SubCUTAneous, PRN  dextrose 10 % infusion, , IntraVENous, Continuous PRN  insulin lispro (HUMALOG) injection vial 0-8 Units, 0-8 Units, SubCUTAneous, TID WC  insulin lispro (HUMALOG) injection vial 0-4 Units, 0-4 Units, SubCUTAneous, Nightly    ASSESSMENT AND PLAN    POD #2 Suspension laryngoscopy and therapeutic bronchoscopy with dilation debridement and resection of obstructing soft tissues in both the larynx and trachea; resection of peristomal superficial granulation tissue    -Patient continues reports she is doing well since surgery.   She is pleased with her ability to breathe and able to vocalize with finger occlusion of tracheostomy  -No evidence of postop bleeding, but some stable pink-tinged mucus noted on inner cannula  -Blood glucose management per hospitalist recommendation  -Patient will need home health at discharge to help with monitoring blood glucose ensure she is appropriately managed since her prednisone will be continued at time of discharge    Electronically signed by MICHAEL Ribera on 8/12/2022 at 11:55 AM

## 2022-08-12 NOTE — FLOWSHEET NOTE
08/12/22 1519   Encounter Summary   Encounter Overview/Reason  Initial Encounter   Service Provided For: Patient   Referral/Consult From: [facility]   Support System Unknown   Last Encounter  08/12/22   Complexity of Encounter Moderate   Begin Time 1458   End Time  1508   Total Time Calculated 10 min   Encounter    Type Initial Screen/Assessment   Assessment/Intervention/Outcome   Assessment Calm;Powerlessness   Intervention Active listening;Prayer (assurance of)/[place]   Outcome Expressed Gratitude     Notes from  Intern Dr. Jericho Garcia:    Assessment:  P is in postsurgical care. So she is not able to hold a conversation until she gets better. She indicated to me that she was weary due to the sickness. So, I talked more than the patient did. She did her best in communicating with me. I talked more and told her I am going to pray for her. I prayed. She appreciated it. Outcome:    Darnell Kamara was happy for my coming and praying. She motioned with her hands to express her gratitude. Care Plan: Continue to help the patient with prayers and encouragement.

## 2022-08-12 NOTE — PROGRESS NOTES
[facility]   Nephrology progress note  8/12/2022, 10:18 AM      Pt Name:    Claudell Ken  MRN:     [id number]     YOB: 1950  Admit Date:    8/10/2022  8:15 AM    Chief Complaint: Nephrology following for elevated creatinine    Subjective:  Patient seen and examined  No chest pain or shortness of breath  Feels okay    Objective:  24HR INTAKE/OUTPUT:    Intake/Output Summary (Last 24 hours) at 8/12/2022 1018  Last data filed at 8/12/2022 0839  Gross per 24 hour   Intake 2547.69 ml   Output 2800 ml   Net -252.31 ml         I/O last 3 completed shifts: In: 2877.7 [P.O.:920;  I.V.:1812.7; IV Piggyback:145]  Out: 4881 [Urine:5250]  I/O this shift:  In: 20 [I.V.:20]  Out: -    Admission weight: 202 lb (91.6 kg)  Wt Readings from Last 3 Encounters:   08/12/22 205 lb (93 kg)   07/07/22 196 lb 3.2 oz (89 kg)   06/29/22 198 lb (89.8 kg)        Vitals :   Vitals:    08/11/22 2045 08/11/22 2306 08/12/22 0249 08/12/22 0842   BP: (!) 149/75 133/78 (!) 163/93 (!) 143/70   Pulse: 65 62 80 77   Resp: 18 20 22 18   Temp: 98.2 °F (36.8 °C) 97.8 °F (36.6 °C) 97.9 °F (36.6 °C) 97.6 °F (36.4 °C)   TempSrc: Oral Oral Oral Oral   SpO2: 98% 97% 94% 97%   Weight:   205 lb (93 kg)    Height:           Physical examination  General Appearance: alert and cooperative with exam, appears comfortable, no distress  Mouth/Throat: Oral mucosa moist  Neck: No JVD  Lungs: Air entry B/L, no rales, no use of accessory muscles  Heart:  S1, S2 heard  GI: soft, non-tender, no guarding    Medications:  Infusion:    sodium chloride      sodium chloride 75 mL/hr at 08/11/22 1645    dextrose       Meds:    insulin lispro  15 Units SubCUTAneous TID WC    insulin glargine  40 Units SubCUTAneous Nightly    sodium chloride flush  5-40 mL IntraVENous 2 times per day    bacitracin   Topical Q8H    polyethylene glycol  17 g Oral Daily    enoxaparin  40 mg SubCUTAneous Daily    piperacillin-tazobactam  3,375 mg IntraVENous Q8H rosuvastatin  10 mg Oral Daily    aspirin  81 mg Oral Daily    furosemide  40 mg Oral Daily    metoprolol succinate  25 mg Oral Daily    lisinopril  10 mg Oral Daily    pantoprazole  40 mg Oral BID AC    famotidine  40 mg Oral BID    predniSONE  15 mg Oral BID    insulin lispro  0-8 Units SubCUTAneous TID WC    insulin lispro  0-4 Units SubCUTAneous Nightly     Meds prn: sodium chloride flush, sodium chloride, ondansetron **OR** ondansetron, oxyCODONE **OR** oxyCODONE, HYDROmorphone **OR** HYDROmorphone, nitroGLYCERIN, hydrOXYzine pamoate, guaiFENesin, glucose, dextrose bolus **OR** dextrose bolus, glucagon (rDNA), dextrose     Lab Data :  CBC: No results for input(s): WBC, HGB, HCT, PLT in the last 72 hours. CMP:  Recent Labs     08/10/22  0849 08/11/22  0504 08/12/22  0520   NA  --   --  135   K 4.3  --  4.4   CL  --   --  102   CO2  --   --  23   BUN  --   --  29*   CREATININE  --  1.1 1.0   GLUCOSE  --   --  339*   CALCIUM  --   --  9.1     Hepatic: No results for input(s): LABALBU, AST, ALT, ALB, BILITOT, ALKPHOS in the last 72 hours. Assessment and Plan:  1. Mildly elevated serum creatinine. Overall stable. Creatinine is 1.0.  UA, UPC still pending. Nephrology was consulted for PICC line clearance given mildly elevated creatinine. Patient was cleared for PICC line placement yesterday. 2.  Diabetes mellitus. Hospitalist managing  3. Tracheal stenosis/posterior glottic stenosis status post laryngoplasty  4. Hypertension  5. Coronary artery disease    Discussed with the patient and family member. Overall stable renal function. Will follow intermittently but needs UA and UPC. Jessica Bergeron MD  [facility]    This report has been created using voice recognition software.  It may contain minor errors which are inherent in voice recognition technology

## 2022-08-13 DIAGNOSIS — J38.6 LARYNGEAL STENOSIS: ICD-10-CM

## 2022-08-13 DIAGNOSIS — J95.03 TRACHEAL STENOSIS DUE TO TRACHEOSTOMY (HCC): Primary | ICD-10-CM

## 2022-08-13 LAB
GLUCOSE BLD-MCNC: 202 MG/DL (ref 70–108)
GLUCOSE BLD-MCNC: 248 MG/DL (ref 70–108)
GLUCOSE BLD-MCNC: 269 MG/DL (ref 70–108)
GLUCOSE BLD-MCNC: 272 MG/DL (ref 70–108)
GLUCOSE BLD-MCNC: 277 MG/DL (ref 70–108)

## 2022-08-13 PROCEDURE — 6370000000 HC RX 637 (ALT 250 FOR IP): Performed by: OTOLARYNGOLOGY

## 2022-08-13 PROCEDURE — 6370000000 HC RX 637 (ALT 250 FOR IP): Performed by: STUDENT IN AN ORGANIZED HEALTH CARE EDUCATION/TRAINING PROGRAM

## 2022-08-13 PROCEDURE — 2580000003 HC RX 258: Performed by: OTOLARYNGOLOGY

## 2022-08-13 PROCEDURE — 2060000000 HC ICU INTERMEDIATE R&B

## 2022-08-13 PROCEDURE — 99233 SBSQ HOSP IP/OBS HIGH 50: CPT | Performed by: STUDENT IN AN ORGANIZED HEALTH CARE EDUCATION/TRAINING PROGRAM

## 2022-08-13 PROCEDURE — 82948 REAGENT STRIP/BLOOD GLUCOSE: CPT

## 2022-08-13 PROCEDURE — 2700000000 HC OXYGEN THERAPY PER DAY

## 2022-08-13 PROCEDURE — 02HV33Z INSERTION OF INFUSION DEVICE INTO SUPERIOR VENA CAVA, PERCUTANEOUS APPROACH: ICD-10-PCS | Performed by: OTOLARYNGOLOGY

## 2022-08-13 PROCEDURE — 6360000002 HC RX W HCPCS: Performed by: OTOLARYNGOLOGY

## 2022-08-13 PROCEDURE — 94761 N-INVAS EAR/PLS OXIMETRY MLT: CPT

## 2022-08-13 RX ORDER — INSULIN GLARGINE 100 [IU]/ML
50 INJECTION, SOLUTION SUBCUTANEOUS NIGHTLY
Status: DISCONTINUED | OUTPATIENT
Start: 2022-08-13 | End: 2022-08-14 | Stop reason: HOSPADM

## 2022-08-13 RX ORDER — INSULIN LISPRO 100 [IU]/ML
20 INJECTION, SOLUTION INTRAVENOUS; SUBCUTANEOUS
Status: DISCONTINUED | OUTPATIENT
Start: 2022-08-13 | End: 2022-08-14 | Stop reason: HOSPADM

## 2022-08-13 RX ORDER — INSULIN GLARGINE 100 [IU]/ML
50 INJECTION, SOLUTION SUBCUTANEOUS NIGHTLY
Qty: 15 ML | Refills: 0 | Status: SHIPPED | OUTPATIENT
Start: 2022-08-13 | End: 2022-08-14 | Stop reason: SDUPTHER

## 2022-08-13 RX ORDER — INSULIN LISPRO 100 [IU]/ML
20-25 INJECTION, SOLUTION INTRAVENOUS; SUBCUTANEOUS
Qty: 22.5 ML | Refills: 0 | Status: SHIPPED | OUTPATIENT
Start: 2022-08-13 | End: 2022-08-14 | Stop reason: HOSPADM

## 2022-08-13 RX ORDER — INSULIN GLARGINE 100 [IU]/ML
45 INJECTION, SOLUTION SUBCUTANEOUS NIGHTLY
Status: DISCONTINUED | OUTPATIENT
Start: 2022-08-13 | End: 2022-08-13

## 2022-08-13 RX ADMIN — GUAIFENESIN 400 MG: 200 SOLUTION ORAL at 08:48

## 2022-08-13 RX ADMIN — ENOXAPARIN SODIUM 40 MG: 100 INJECTION SUBCUTANEOUS at 08:48

## 2022-08-13 RX ADMIN — FAMOTIDINE 40 MG: 20 TABLET ORAL at 20:45

## 2022-08-13 RX ADMIN — INSULIN LISPRO 4 UNITS: 100 INJECTION, SOLUTION INTRAVENOUS; SUBCUTANEOUS at 08:34

## 2022-08-13 RX ADMIN — INSULIN LISPRO 20 UNITS: 100 INJECTION, SOLUTION INTRAVENOUS; SUBCUTANEOUS at 11:07

## 2022-08-13 RX ADMIN — SODIUM CHLORIDE, PRESERVATIVE FREE 10 ML: 5 INJECTION INTRAVENOUS at 20:45

## 2022-08-13 RX ADMIN — PIPERACILLIN AND TAZOBACTAM 3375 MG: 3; .375 INJECTION, POWDER, FOR SOLUTION INTRAVENOUS at 16:19

## 2022-08-13 RX ADMIN — PANTOPRAZOLE SODIUM 40 MG: 40 TABLET, DELAYED RELEASE ORAL at 05:47

## 2022-08-13 RX ADMIN — BACITRACIN: 500 OINTMENT TOPICAL at 14:44

## 2022-08-13 RX ADMIN — LISINOPRIL 10 MG: 10 TABLET ORAL at 08:50

## 2022-08-13 RX ADMIN — PREDNISONE 15 MG: 10 TABLET ORAL at 20:45

## 2022-08-13 RX ADMIN — POLYETHYLENE GLYCOL 3350 17 G: 17 POWDER, FOR SOLUTION ORAL at 08:49

## 2022-08-13 RX ADMIN — METOPROLOL SUCCINATE 25 MG: 25 TABLET, EXTENDED RELEASE ORAL at 08:50

## 2022-08-13 RX ADMIN — ASPIRIN 81 MG 81 MG: 81 TABLET ORAL at 08:49

## 2022-08-13 RX ADMIN — INSULIN LISPRO 8 UNITS: 100 INJECTION, SOLUTION INTRAVENOUS; SUBCUTANEOUS at 18:16

## 2022-08-13 RX ADMIN — PIPERACILLIN AND TAZOBACTAM 3375 MG: 3; .375 INJECTION, POWDER, FOR SOLUTION INTRAVENOUS at 09:09

## 2022-08-13 RX ADMIN — INSULIN LISPRO 4 UNITS: 100 INJECTION, SOLUTION INTRAVENOUS; SUBCUTANEOUS at 13:35

## 2022-08-13 RX ADMIN — INSULIN GLARGINE 50 UNITS: 100 INJECTION, SOLUTION SUBCUTANEOUS at 20:45

## 2022-08-13 RX ADMIN — INSULIN LISPRO 20 UNITS: 100 INJECTION, SOLUTION INTRAVENOUS; SUBCUTANEOUS at 18:15

## 2022-08-13 RX ADMIN — SODIUM CHLORIDE, PRESERVATIVE FREE 10 ML: 5 INJECTION INTRAVENOUS at 08:51

## 2022-08-13 RX ADMIN — GUAIFENESIN 400 MG: 200 SOLUTION ORAL at 20:51

## 2022-08-13 RX ADMIN — BACITRACIN: 500 OINTMENT TOPICAL at 05:48

## 2022-08-13 RX ADMIN — FUROSEMIDE 40 MG: 40 TABLET ORAL at 08:51

## 2022-08-13 RX ADMIN — PREDNISONE 15 MG: 10 TABLET ORAL at 08:50

## 2022-08-13 RX ADMIN — FAMOTIDINE 40 MG: 20 TABLET ORAL at 08:49

## 2022-08-13 RX ADMIN — PIPERACILLIN AND TAZOBACTAM 3375 MG: 3; .375 INJECTION, POWDER, FOR SOLUTION INTRAVENOUS at 00:11

## 2022-08-13 RX ADMIN — BACITRACIN: 500 OINTMENT TOPICAL at 23:40

## 2022-08-13 RX ADMIN — ROSUVASTATIN 10 MG: 10 TABLET, FILM COATED ORAL at 08:49

## 2022-08-13 RX ADMIN — PANTOPRAZOLE SODIUM 40 MG: 40 TABLET, DELAYED RELEASE ORAL at 16:19

## 2022-08-13 ASSESSMENT — PAIN SCALES - GENERAL
PAINLEVEL_OUTOF10: 0

## 2022-08-13 NOTE — PLAN OF CARE
Problem: Discharge Planning  Goal: Discharge to home or other facility with appropriate resources  8/13/2022 0037 by Susanne Godinez RN  Outcome: Progressing  Flowsheets (Taken 8/12/2022 2028)  Discharge to home or other facility with appropriate resources:   Identify barriers to discharge with patient and caregiver   Identify discharge learning needs (meds, wound care, etc)     Problem: Chronic Conditions and Co-morbidities  Goal: Patient's chronic conditions and co-morbidity symptoms are monitored and maintained or improved  8/13/2022 0037 by Susanne Godinez RN  Outcome: Progressing  Flowsheets (Taken 8/12/2022 2028)  Care Plan - Patient's Chronic Conditions and Co-Morbidity Symptoms are Monitored and Maintained or Improved: Monitor and assess patient's chronic conditions and comorbid symptoms for stability, deterioration, or improvement     Problem: Pain  Goal: Verbalizes/displays adequate comfort level or baseline comfort level  8/13/2022 0037 by Susanne Godinez RN  Outcome: Progressing  Flowsheets (Taken 8/13/2022 0037)  Verbalizes/displays adequate comfort level or baseline comfort level:   Encourage patient to monitor pain and request assistance   Assess pain using appropriate pain scale   Implement non-pharmacological measures as appropriate and evaluate response     Problem: Safety - Adult  Goal: Free from fall injury  8/13/2022 0037 by Susanne Godinez RN  Outcome: Progressing  Flowsheets (Taken 8/13/2022 0036)  Free From Fall Injury: Instruct family/caregiver on patient safety     Problem: ABCDS Injury Assessment  Goal: Absence of physical injury  8/13/2022 0037 by Susanne Godinez RN  Outcome: Progressing  Flowsheets (Taken 8/13/2022 0036)  Absence of Physical Injury: Implement safety measures based on patient assessment     Problem: Respiratory - Adult  Goal: Achieves optimal ventilation and oxygenation  8/13/2022 0037 by Susanne Godinez RN  Outcome: Progressing  Flowsheets (Taken 8/12/2022 2028)  Achieves optimal ventilation and oxygenation:   Assess for changes in respiratory status   Assess the need for suctioning and aspirate as needed   Assess and instruct to report shortness of breath or any respiratory difficulty   Care plan reviewed with patient. Patient verbalized understanding of the plan of care and contribute to goal setting.

## 2022-08-13 NOTE — PROGRESS NOTES
Hospitalist Progress Note      Patient:  Samantha Velásquez    Unit/Bed:4K-04/004-A  YOB: 1950  MRN: [id number]   Acct: [de-identified]   PCP: Kian Stacy  Date of Admission: 8/10/2022    Assessment/Plan:    DMT2 with hyperglycemia  BG better controlled and in the low to mid 200's on current regimen. I will increase her basal insulin to 50u and meal time insulin to 20u TID. Recommend continuing this same regimen at discharge. Prescriptions provided  Continue high dose SSI while inpatient. Prednisone use is expected to be continued until her next surgery several weeks from now. Posterior glottic stenosis  Chronic tracheostomy  POD 2 following transoral laryngoplasty with left partial arytenoidectomy and posterior lateralization advancement flap reconstruction with jet vent with ENT 2/2 prolonged intubation earlier this year from COVID-19 pneumonia  ENT to manage  HTN  Continue lisinopril and lasix. Hx CAD  On ASA/Statin  Chronic HFrEF  EF 30-35%. Recommend DC of IVF and continuation of lasix. HLD  Continue statin      Subjective (past 24 hours): She denies any chest pain or difficulty breathing. She is asking when she can be discharged home. Denies fevers or chills. Past medical history, family history, social history and allergies reviewed again and is unchanged since admission. ROS (12 point review of systems completed. Pertinent positives noted.  Otherwise ROS is negative)     Medications:  Reviewed    Infusion Medications    sodium chloride      dextrose       Scheduled Medications    insulin lispro  20 Units SubCUTAneous TID WC    insulin glargine  50 Units SubCUTAneous Nightly    insulin lispro  0-16 Units SubCUTAneous TID WC    insulin lispro  0-4 Units SubCUTAneous Nightly    sodium chloride flush  5-40 mL IntraVENous 2 times per day    bacitracin   Topical Q8H    polyethylene glycol  17 g Oral Daily    enoxaparin  40 mg SubCUTAneous Daily    piperacillin-tazobactam  3,375 mg IntraVENous Q8H    rosuvastatin  10 mg Oral Daily    aspirin  81 mg Oral Daily    furosemide  40 mg Oral Daily    metoprolol succinate  25 mg Oral Daily    lisinopril  10 mg Oral Daily    pantoprazole  40 mg Oral BID AC    famotidine  40 mg Oral BID    predniSONE  15 mg Oral BID     PRN Meds: sodium chloride flush, sodium chloride, ondansetron **OR** ondansetron, oxyCODONE **OR** oxyCODONE, HYDROmorphone **OR** HYDROmorphone, nitroGLYCERIN, hydrOXYzine pamoate, guaiFENesin, glucose, dextrose bolus **OR** dextrose bolus, glucagon (rDNA), dextrose      Intake/Output Summary (Last 24 hours) at 8/13/2022 1237  Last data filed at 8/13/2022 0414  Gross per 24 hour   Intake 1675.65 ml   Output 4850 ml   Net -3174.35 ml         Diet:  ADULT DIET; Regular; 4 carb choices (60 gm/meal)    Exam:  /80   Pulse 81   Temp 98.2 °F (36.8 °C) (Oral)   Resp 18   Ht 5' 2\" (1.575 m)   Wt 204 lb 8 oz (92.8 kg)   LMP  (LMP Unknown)   SpO2 100%   BMI 37.40 kg/m²   General appearance: No apparent distress, appears stated age and cooperative. HEENT: Pupils equal, round, and reactive to light. Conjunctivae/corneas clear. Neck: Tracheostomy in the midline. No surrounding drainage or erythema. Trach collar in place. Respiratory:  Normal respiratory effort. Clear to auscultation, bilaterally without Rales/Wheezes/Rhonchi. Cardiovascular: Regular rate and rhythm with normal S1/S2 without murmurs, rubs or gallops. Abdomen: Soft, non-tender, non-distended with normal bowel sounds. Musculoskeletal: passive and active ROM x 4 extremities. Skin: Skin color, texture, turgor normal.  No rashes or lesions. Neurologic:  Neurovascularly intact without any focal sensory/motor deficits.  Cranial nerves: II-XII intact, grossly non-focal.  Psychiatric: Alert and oriented, thought content appropriate, normal insight  Capillary Refill: Brisk,< 3 seconds   Peripheral Pulses: +2 terminates 5.6 cm above the telma at the level of the clavicles. 3. Mild cardiomegaly. 4. No focal pulmonary infiltrate. 5. Otherwise, there has been no other significant interval change in the radiographic appearance of the chest  from 8/10/2022 at 1245 hours. **This report has been created using voice recognition software. It may contain minor errors which are inherent in voice recognition technology. **      Final report electronically signed by Dr Betty Kolb on 8/11/2022 3:11 PM      XR CHEST PORTABLE   Final Result   1. The tracheostomy tube terminates 3.2 cm above the telma just above the level of the clavicles. 2. There is now increased interstitial markings that may be postprocedural or due to the low lung volumes. 3. Marked cardiomegaly. 4. No pneumothorax. **This report has been created using voice recognition software. It may contain minor errors which are inherent in voice recognition technology. **      Final report electronically signed by Dr Betty Kolb on 8/10/2022 3:05 PM        XR CHEST PORTABLE    Result Date: 8/10/2022  PROCEDURE: XR CHEST PORTABLE CLINICAL INFORMATION: jet vent COMPARISON: Chest radiograph 6/6/2022 TECHNIQUE: AP portable chest radiograph performed. 1. The tracheostomy tube terminates 3.2 cm above the telma just above the level of the clavicles. 2. There is now increased interstitial markings that may be postprocedural or due to the low lung volumes. 3. Marked cardiomegaly. 4. No pneumothorax. **This report has been created using voice recognition software. It may contain minor errors which are inherent in voice recognition technology. ** Final report electronically signed by Dr Betty Kolb on 8/10/2022 3:05 PM      Electronically signed by Larry Felipe DO on 8/13/2022 at 12:37 PM

## 2022-08-13 NOTE — FLOWSHEET NOTE
08/13/22 1728   Safe Environment   Safety Measures   (virtual safety rounds complete)   Virtual nurse rounds, patient up in chair. Denies any needs at this time. No safety concerns identified. Call light within reach. Will continue to monitor.

## 2022-08-13 NOTE — FLOWSHEET NOTE
08/13/22 1408   Safe Environment   Safety Measures   (virtual safety rounds complete)   Virtual nurse rounds, patient up in chair. Denies any needs at this time. No safety concerns identified. Call light within reach. Will continue to monitor.

## 2022-08-13 NOTE — FLOWSHEET NOTE
08/13/22 1057   Safe Environment   Safety Measures   (virtual safety rounds complete)   Virtual nurse rounds, patient up in chair. Denies any needs at this time. No safety concerns identified. Call light within reach. Will continue to monitor.

## 2022-08-13 NOTE — PROGRESS NOTES
Progress Note  Date:2022       OZJW:4J-44/168-O  Patient Name:Kaleigh Sierra     YOB: 1950     Age:71 y.o. The patient is postop day 3 status post extensive airway dilation and debridement for subglottic and suprastomal tracheal stenosis      Subjective    Subjective   The patient reports great difficulty tolerating her Passy-Janeth valve but content she is still working at it      Review of Systems  As above      Objective         Vitals Last 24 Hours:  TEMPERATURE:  Temp  Av.1 °F (36.7 °C)  Min: 97.5 °F (36.4 °C)  Max: 98.8 °F (37.1 °C)  RESPIRATIONS RANGE: Resp  Av.3  Min: 18  Max: 20  PULSE OXIMETRY RANGE: SpO2  Av.3 %  Min: 96 %  Max: 100 %  PULSE RANGE: Pulse  Av  Min: 53  Max: 81  BLOOD PRESSURE RANGE: Systolic (95SIU), QOM:890 , Min:125 , HIY:627   ; Diastolic (30NSM), VIF:84, Min:67, Max:80    I/O (24Hr): Intake/Output Summary (Last 24 hours) at 2022 1423  Last data filed at 2022 1403  Gross per 24 hour   Intake 2155.65 ml   Output 5800 ml   Net -3644.35 ml     Objective  The patient is able to speak with finger occlusion with relative ease. She is just not able to exhale through her mouth reliably to not air trapping become progressively uncomfortable        Labs/Imaging/Diagnostics    Labs:  CBC:No results for input(s): WBC, RBC, HGB, HCT, MCV, RDW, PLT in the last 72 hours. CHEMISTRIES:  Recent Labs     22  0504 22  0520   NA  --  135   K  --  4.4   CL  --  102   CO2  --  23   BUN  --  29*   CREATININE 1.1 1.0   GLUCOSE  --  339*     PT/INR:No results for input(s): PROTIME, INR in the last 72 hours. APTT:No results for input(s): APTT in the last 72 hours. LIVER PROFILE:No results for input(s): AST, ALT, BILIDIR, BILITOT, ALKPHOS in the last 72 hours.     Imaging Last 24 Hours:  XR CHEST PORTABLE    Result Date: 2022  PROCEDURE: XR CHEST PORTABLE CLINICAL INFORMATION: PICC placement STAT COMPARISON: Chest radiograph 8/10/2022 TECHNIQUE: AP portable chest radiograph performed. 1. The left PICC line has its tip overlying the region of the superior vena cava. 2. The tracheostomy tube terminates 5.6 cm above the telma at the level of the clavicles. 3. Mild cardiomegaly. 4. No focal pulmonary infiltrate. 5. Otherwise, there has been no other significant interval change in the radiographic appearance of the chest  from 8/10/2022 at 1245 hours. **This report has been created using voice recognition software. It may contain minor errors which are inherent in voice recognition technology. 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 However, there is still a substantial chance that she will restenosis and may even become more scarred and if her scarring process is not vigorously blocked with systemic steroids and serial dilations. As such I will see her back in approximately 10 days in the clinic for follow-up awake endoscopy and to plan her neck stage of care which will likely be within 4 weeks. The patient is considered to be adequately glucose controlled as long as she checks her sugars regularly and uses a sliding scale. She will be on the 30 mg of prednisone per day for as long as the next 2 to 3 months depending on how hard it is to bring her suprastomal airway stenosis under control.   The alternative is to let her airway scar and then perform an open reconstruction to remove the offending segment which would be substantially more surgically traumatic for the patient.     Electronically signed by Kelby Eugene MD on 8/13/22 at 2:23 PM EDT

## 2022-08-14 VITALS
SYSTOLIC BLOOD PRESSURE: 116 MMHG | HEIGHT: 62 IN | TEMPERATURE: 98.4 F | WEIGHT: 204.1 LBS | BODY MASS INDEX: 37.56 KG/M2 | RESPIRATION RATE: 20 BRPM | HEART RATE: 63 BPM | DIASTOLIC BLOOD PRESSURE: 64 MMHG | OXYGEN SATURATION: 100 %

## 2022-08-14 LAB
GLUCOSE BLD-MCNC: 156 MG/DL (ref 70–108)
GLUCOSE BLD-MCNC: 161 MG/DL (ref 70–108)

## 2022-08-14 PROCEDURE — 82948 REAGENT STRIP/BLOOD GLUCOSE: CPT

## 2022-08-14 PROCEDURE — 6370000000 HC RX 637 (ALT 250 FOR IP): Performed by: STUDENT IN AN ORGANIZED HEALTH CARE EDUCATION/TRAINING PROGRAM

## 2022-08-14 PROCEDURE — 6370000000 HC RX 637 (ALT 250 FOR IP): Performed by: OTOLARYNGOLOGY

## 2022-08-14 PROCEDURE — 2580000003 HC RX 258: Performed by: OTOLARYNGOLOGY

## 2022-08-14 PROCEDURE — 99232 SBSQ HOSP IP/OBS MODERATE 35: CPT | Performed by: STUDENT IN AN ORGANIZED HEALTH CARE EDUCATION/TRAINING PROGRAM

## 2022-08-14 PROCEDURE — 6360000002 HC RX W HCPCS: Performed by: OTOLARYNGOLOGY

## 2022-08-14 RX ORDER — INSULIN LISPRO 100 [IU]/ML
0-8 INJECTION, SOLUTION INTRAVENOUS; SUBCUTANEOUS
Status: DISCONTINUED | OUTPATIENT
Start: 2022-08-14 | End: 2022-08-14 | Stop reason: HOSPADM

## 2022-08-14 RX ORDER — INSULIN ASPART 100 [IU]/ML
20-25 INJECTION, SOLUTION INTRAVENOUS; SUBCUTANEOUS
Qty: 22.5 ML | Refills: 0 | Status: SHIPPED | OUTPATIENT
Start: 2022-08-14 | End: 2022-09-13

## 2022-08-14 RX ORDER — INSULIN LISPRO 100 [IU]/ML
0-4 INJECTION, SOLUTION INTRAVENOUS; SUBCUTANEOUS NIGHTLY
Status: DISCONTINUED | OUTPATIENT
Start: 2022-08-14 | End: 2022-08-14 | Stop reason: HOSPADM

## 2022-08-14 RX ORDER — INSULIN GLARGINE 100 [IU]/ML
50 INJECTION, SOLUTION SUBCUTANEOUS NIGHTLY
Qty: 15 ML | Refills: 0 | Status: SHIPPED | OUTPATIENT
Start: 2022-08-14 | End: 2022-09-13

## 2022-08-14 RX ADMIN — BACITRACIN: 500 OINTMENT TOPICAL at 06:17

## 2022-08-14 RX ADMIN — PANTOPRAZOLE SODIUM 40 MG: 40 TABLET, DELAYED RELEASE ORAL at 06:16

## 2022-08-14 RX ADMIN — PIPERACILLIN AND TAZOBACTAM 3375 MG: 3; .375 INJECTION, POWDER, FOR SOLUTION INTRAVENOUS at 09:10

## 2022-08-14 RX ADMIN — FUROSEMIDE 40 MG: 40 TABLET ORAL at 09:22

## 2022-08-14 RX ADMIN — ASPIRIN 81 MG 81 MG: 81 TABLET ORAL at 08:58

## 2022-08-14 RX ADMIN — PIPERACILLIN AND TAZOBACTAM 3375 MG: 3; .375 INJECTION, POWDER, FOR SOLUTION INTRAVENOUS at 00:00

## 2022-08-14 RX ADMIN — GUAIFENESIN 400 MG: 200 SOLUTION ORAL at 09:25

## 2022-08-14 RX ADMIN — INSULIN LISPRO 20 UNITS: 100 INJECTION, SOLUTION INTRAVENOUS; SUBCUTANEOUS at 12:18

## 2022-08-14 RX ADMIN — METOPROLOL SUCCINATE 25 MG: 25 TABLET, EXTENDED RELEASE ORAL at 08:58

## 2022-08-14 RX ADMIN — ROSUVASTATIN 10 MG: 10 TABLET, FILM COATED ORAL at 08:58

## 2022-08-14 RX ADMIN — PREDNISONE 15 MG: 10 TABLET ORAL at 08:58

## 2022-08-14 RX ADMIN — FAMOTIDINE 40 MG: 20 TABLET ORAL at 08:58

## 2022-08-14 RX ADMIN — POLYETHYLENE GLYCOL 3350 17 G: 17 POWDER, FOR SOLUTION ORAL at 08:58

## 2022-08-14 RX ADMIN — LISINOPRIL 10 MG: 10 TABLET ORAL at 08:58

## 2022-08-14 RX ADMIN — INSULIN LISPRO 20 UNITS: 100 INJECTION, SOLUTION INTRAVENOUS; SUBCUTANEOUS at 08:59

## 2022-08-14 RX ADMIN — SODIUM CHLORIDE, PRESERVATIVE FREE 10 ML: 5 INJECTION INTRAVENOUS at 08:58

## 2022-08-14 RX ADMIN — ENOXAPARIN SODIUM 40 MG: 100 INJECTION SUBCUTANEOUS at 09:21

## 2022-08-14 ASSESSMENT — PAIN SCALES - GENERAL: PAINLEVEL_OUTOF10: 0

## 2022-08-14 NOTE — DISCHARGE INSTR - COC
RECONSTRUCTION WITH JET VENT,THERAPUTIC BRONCHOSCOPY WITH DEBRIEDMENT,WITH BALLOON DILITATION performed by Emeterio Cronin MD at [address]      [place] [address] 4/1/2022    TRACHEOTOMY TUBE REPLACEMENT performed by Juan José Snider MD at [facility]       Immunization History: There is no immunization history on file for this patient. Active Problems:  Patient Active Problem List   Diagnosis Code    COVID-19 U07.1    Hypoxia R09.02    Acute respiratory failure with hypoxia (HCC) J96.01    Debility R53.81    Physical deconditioning R53.81    History of COVID-19 Z86.16    Dysarthria R47.1    Primary hypertension I10    Type 2 diabetes mellitus with hyperglycemia, with long-term current use of insulin (HCC) E11.65, Z79.4    Obesity (BMI 30-39. 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    Hoarseness R49.0    Unstable angina (HCC) I20.0    Chronic respiratory failure with hypoxia (HCC) J96.11    Acute chest pain R07.9    Coronary artery disease involving native heart I25.10    Normocytic anemia D64.9    Anxiety disorder F41.9    Gastroesophageal reflux disease K21.9    Other tracheostomy complication ([address] O38.79    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    Status post surgery Z98.890    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 ([address] E08.59    Elevated serum creatinine R79.89       Isolation/Infection:   Isolation            No Isolation          Patient Infection Status       Infection Onset Added Last Indicated Last Indicated By Review Planned Expiration Resolved Resolved By    None active    Resolved    COVID-19 12/17/21 12/19/21 12/17/21 COVID-19   01/06/22 Willow Dasilva RN    +12/17, admit 12/18 - 80%            Nurse Assessment:  Last Vital Signs: /64   Pulse 63   Temp 98.4 °F (36.9 °C) (Oral)   Resp 20   Ht 5' 2\" (1.575 m)   Wt 204 lb 1.6 oz (92.6 kg)   LMP  (LMP Unknown)   SpO2 100%   BMI 37.33 kg/m²     Last documented pain score (0-10 scale): Pain Level: 0  Last Weight:   Wt Readings from Last 1 Encounters:   08/14/22 204 lb 1.6 oz (92.6 kg)     Mental Status:  {IP PT MENTAL STATUS:20030}    IV Access:  { REGGIE IV ACCESS:733167549}    Nursing Mobility/ADLs:  Walking   {P DME ZHRK:618103077}  Transfer  {[facility] DME YDRV:249244593}  Bathing  {[facility] DME LTSX:981792643}  Dressing  {[facility] DME CGRY:928000400}  Toileting  {P DME QLVX:241600716}  Feeding  {[facility] DME JWIB:029607687}  Med Admin  {[facility] DME DRYL:027089583}  Med Delivery   { REGGIE MED Delivery:569348020}    Wound Care Documentation and Therapy:        Elimination:  Continence: Bowel: {YES / IB:45469}  Bladder: {YES / OF:80076}  Urinary Catheter: {Urinary Catheter:376499061}   Colostomy/Ileostomy/Ileal Conduit: {YES / ZS:15120}       Date of Last BM: ***    Intake/Output Summary (Last 24 hours) at 8/14/2022 1625  Last data filed at 8/14/2022 1359  Gross per 24 hour   Intake 407.64 ml   Output 1400 ml   Net -992.36 ml     I/O last 3 completed shifts:   In: 2513.3 [P.O.:680; I.V.:1472; IV Piggyback:361.3]  Out: 3350 [Urine:3350]    Safety Concerns:     [employer] Safety Concerns:006355107}    Impairments/Disabilities:      [address] Impairments/Disabilities:547782790}    Nutrition Therapy:  Current Nutrition Therapy:   [address] Diet List:078788237}    Routes of Feeding: {CHP DME Other Feedings:999077244}  Liquids: {Slp liquid thickness:60366}  Daily Fluid Restriction: {CHP DME Yes amt example:846738385}  Last Modified Barium Swallow with Video (Video Swallowing Test): {Done Not Done IUD}    Treatments at the Time of Hospital Discharge:   Respiratory Treatments: ***  Oxygen Therapy:  {Therapy; copd oxygen:64657}  Ventilator:    {[facility] Vent SUVX:170275424}    Rehab Therapies: {THERAPEUTIC INTERVENTION:8162822078}  Weight Bearing Status/Restrictions: {[facility] Weight Bearin}  Other Medical Equipment (for information only, NOT a DME order):  {EQUIPMENT:973312414}  Other Treatments: ***    Patient's personal belongings (please select all that are sent with patient):  {P DME Belongings:086057705}    RN SIGNATURE:  {Esignature:648761856}    CASE MANAGEMENT/SOCIAL WORK SECTION    Inpatient Status Date: ***    Readmission Risk Assessment Score:  Readmission Risk              Risk of Unplanned Readmission:  31           Discharging to Facility/ Agency   Name:   Address:  Phone:  Fax:    Dialysis Facility (if applicable)   Name:  Address:  Dialysis Schedule:  Phone:  Fax:    / signature: {Esignature:489112820}    PHYSICIAN SECTION    Prognosis: {Prognosis:7345624018}    Condition at Discharge: [address] [place] [address] Patient Condition:218442906}    Rehab Potential (if transferring to Rehab): {Prognosis:8735043908}    Recommended Labs or Other Treatments After Discharge: ***    Physician Certification: I certify the above information and transfer of Shannon Chou  is necessary for the continuing treatment of the diagnosis listed and that she requires {Admit to Appropriate Level of Care:05960} for {GREATER/LESS:947546468} 30 days.      Update Admission H&P: {CHP DME Changes in LAMDI:746359246}    PHYSICIAN SIGNATURE:  {Esignature:973545518}

## 2022-08-14 NOTE — DISCHARGE INSTR - DIET

## 2022-08-14 NOTE — PROGRESS NOTES
polyethylene glycol  17 g Oral Daily    enoxaparin  40 mg SubCUTAneous Daily    piperacillin-tazobactam  3,375 mg IntraVENous Q8H    rosuvastatin  10 mg Oral Daily    aspirin  81 mg Oral Daily    furosemide  40 mg Oral Daily    metoprolol succinate  25 mg Oral Daily    lisinopril  10 mg Oral Daily    pantoprazole  40 mg Oral BID AC    famotidine  40 mg Oral BID    predniSONE  15 mg Oral BID     PRN Meds: sodium chloride flush, sodium chloride, ondansetron **OR** ondansetron, oxyCODONE **OR** oxyCODONE, HYDROmorphone **OR** HYDROmorphone, nitroGLYCERIN, hydrOXYzine pamoate, guaiFENesin, glucose, dextrose bolus **OR** dextrose bolus, glucagon (rDNA), dextrose      Intake/Output Summary (Last 24 hours) at 8/14/2022 0948  Last data filed at 8/14/2022 0756  Gross per 24 hour   Intake 837.64 ml   Output 2350 ml   Net -1512.36 ml         Diet:  ADULT DIET; Regular; 4 carb choices (60 gm/meal)    Exam:  /77   Pulse 56   Temp 97.7 °F (36.5 °C) (Oral)   Resp 20   Ht 5' 2\" (1.575 m)   Wt 204 lb 1.6 oz (92.6 kg)   LMP  (LMP Unknown)   SpO2 97%   BMI 37.33 kg/m²   General appearance: No apparent distress, appears stated age and cooperative. HEENT: Pupils equal, round, and reactive to light. Conjunctivae/corneas clear. Neck: Tracheostomy in the midline. No surrounding drainage or erythema. Trach collar in place. Respiratory:  Normal respiratory effort. Clear to auscultation, bilaterally without Rales/Wheezes/Rhonchi. Cardiovascular: Regular rate and rhythm with normal S1/S2 without murmurs, rubs or gallops. Abdomen: Soft, non-tender, non-distended with normal bowel sounds. Musculoskeletal: passive and active ROM x 4 extremities. Skin: Skin color, texture, turgor normal.  No rashes or lesions. Neurologic:  Neurovascularly intact without any focal sensory/motor deficits.  Cranial nerves: II-XII intact, grossly non-focal.  Psychiatric: Alert and oriented, thought content appropriate, normal insight  Capillary Refill: Brisk,< 3 seconds   Peripheral Pulses: +2 palpable, equal bilaterally     Labs:   No results for input(s): WBC, HGB, HCT, PLT in the last 72 hours. Recent Labs     08/12/22  0520      K 4.4      CO2 23   BUN 29*   CREATININE 1.0   CALCIUM 9.1       No results for input(s): AST, ALT, BILIDIR, BILITOT, ALKPHOS in the last 72 hours. No results for input(s): INR in the last 72 hours. No results for input(s): Isha Fuchs in the last 72 hours. Microbiology:    Blood culture #1:   Lab Results   Component Value Date/Time    BC No growth-preliminary  01/02/2022 03:29 PM    BC  01/02/2022 03:29 PM     possible contamination; clinical correlation required     BC No growth-preliminary No growth  01/02/2022 03:29 PM       Blood culture #2:No results found for: Yris Victor    Organism:  Lab Results   Component Value Date/Time    ORG gram positive bacilli 01/10/2022 01:30 PM         Lab Results   Component Value Date/Time    LABGRAM  03/28/2022 12:53 PM     Rare segmented neutrophils observed. No epithelial cells observed. No bacteria seen. MRSA culture only:No results found for: [facility]    Urine culture:   Lab Results   Component Value Date/Time    LABURIN No growth-preliminary No growth  12/20/2021 08:57 PM       Respiratory culture: No results found for: CULTRESP    Aerobic and Anaerobic :  Lab Results   Component Value Date/Time    LABAERO Normal john  03/28/2022 12:53 PM     No results found for: GRACE    Urinalysis:      Lab Results   Component Value Date/Time    NITRU NEGATIVE 08/12/2022 12:30 PM    WBCUA 15-25 01/10/2022 01:30 PM    BACTERIA NONE 01/10/2022 01:30 PM    RBCUA > 200 01/10/2022 01:30 PM    BLOODU NEGATIVE 08/12/2022 12:30 PM    SPECGRAV 1.013 08/12/2022 12:30 PM    GLUCOSEU NEGATIVE 01/10/2022 01:30 PM       Radiology:  XR CHEST PORTABLE   Final Result   1. The left PICC line has its tip overlying the region of the superior vena cava.    2. The tracheostomy tube terminates 5.6 cm above the telma at the level of the clavicles. 3. Mild cardiomegaly. 4. No focal pulmonary infiltrate. 5. Otherwise, there has been no other significant interval change in the radiographic appearance of the chest  from 8/10/2022 at 1245 hours. **This report has been created using voice recognition software. It may contain minor errors which are inherent in voice recognition technology. **      Final report electronically signed by Dr Marcellus Albrecht on 2022 3:11 PM      XR CHEST PORTABLE   Final Result   1. The tracheostomy tube terminates 3.2 cm above the telma just above the level of the clavicles. 2. There is now increased interstitial markings that may be postprocedural or due to the low lung volumes. 3. Marked cardiomegaly. 4. No pneumothorax. **This report has been created using voice recognition software. It may contain minor errors which are inherent in voice recognition technology. **      Final report electronically signed by Dr Marcellus Albrecht on 8/10/2022 3:05 PM        XR CHEST PORTABLE    Result Date: 8/10/2022  PROCEDURE: XR CHEST PORTABLE CLINICAL INFORMATION: jet vent COMPARISON: Chest radiograph 2022 TECHNIQUE: AP portable chest radiograph performed. 1. The tracheostomy tube terminates 3.2 cm above the telma just above the level of the clavicles. 2. There is now increased interstitial markings that may be postprocedural or due to the low lung volumes. 3. Marked cardiomegaly. 4. No pneumothorax. **This report has been created using voice recognition software. It may contain minor errors which are inherent in voice recognition technology. ** Final report electronically signed by Dr Marcellus Albrecht on 8/10/2022 3:05 PM      Electronically signed by Jaqui Carey DO on 2022 at 9:48 AM

## 2022-08-14 NOTE — PLAN OF CARE
Problem: Discharge Planning  Goal: Discharge to home or other facility with appropriate resources  Outcome: Completed     Problem: Chronic Conditions and Co-morbidities  Goal: Patient's chronic conditions and co-morbidity symptoms are monitored and maintained or improved  Outcome: Completed     Problem: Pain  Goal: Verbalizes/displays adequate comfort level or baseline comfort level  Outcome: Completed     Problem: Safety - Adult  Goal: Free from fall injury  Outcome: Completed     Problem: ABCDS Injury Assessment  Goal: Absence of physical injury  Outcome: Completed     Problem: Respiratory - Adult  Goal: Achieves optimal ventilation and oxygenation  Outcome: Completed

## 2022-08-14 NOTE — PROGRESS NOTES
Progress Note  Date:2022       NBXC:3G-32/097-M  Patient Cher Sierra     YOB: 1950     Age:71 y.o. The patient is postop day 3 status post suspension laryngoscopy and therapeutic bronchoscopy with dilation debridement and steroid injections for subglottic and peristomal tracheal stenosis. The patient continues to improve in her ability to tolerate her Passy-Taft valve but she is still unable to keep it in place for more than a few seconds. Her blood glucose levels have been brought under excellent control with the last 2 being in the 150 range. Subjective    Subjective   As above    Review of Systems  Noncontributory except as above    Objective         Vitals Last 24 Hours:  TEMPERATURE:  Temp  Av.9 °F (36.6 °C)  Min: 97.5 °F (36.4 °C)  Max: 98.4 °F (36.9 °C)  RESPIRATIONS RANGE: Resp  Av.6  Min: 18  Max: 20  PULSE OXIMETRY RANGE: SpO2  Av.4 %  Min: 97 %  Max: 100 %  PULSE RANGE: Pulse  Av.2  Min: 56  Max: 70  BLOOD PRESSURE RANGE: Systolic (65PWS), ZVL:138 , Min:116 , IBK:004   ; Diastolic (98UTN), PLN:42, Min:64, Max:81    I/O (24Hr): Intake/Output Summary (Last 24 hours) at 2022 1638  Last data filed at 2022 1359  Gross per 24 hour   Intake 407.64 ml   Output 1400 ml   Net -992.36 ml     Objective  On general physical exam the patient is a pleasant alert and cooperative older middle-aged female in no acute distress  She is able to produce a moderately low pitch moderately raspy voice on applying her Passy-Taft valve  She has no signs of recent bleeding about the base of her tracheostomy cannula  She is breathing through her tracheostomy without undue resistance and with no signs of mucous plug formation    Labs/Imaging/Diagnostics    Labs:  CBC:No results for input(s): WBC, RBC, HGB, HCT, MCV, RDW, PLT in the last 72 hours.   CHEMISTRIES:  Recent Labs     22  0520      K 4.4      CO2 23   BUN 29*   CREATININE 1.0   GLUCOSE 339*

## 2022-08-14 NOTE — DISCHARGE INSTRUCTIONS
Instructions for Hector Hart from Dr. Corazon Ngo:    1. Keep your tracheostomy clean of secretions at all times  2. Use saline bullets that you used for saline nebulizer as an irrigation method to loosen secretions of that become difficult to suction out  3. With your 's help, literally changed out the entire tracheostomy if you start developing dangerous shortness of breath, at the same time is to call EMS to come to the home to help you. Do not wait for EMS if you are in serious trouble with an obstructed cannula. 4.  Use your Passy-Janeth valve is much as you can tolerate to speak and to exhale so as to distend the tissues above the tracheostomy which will help you ultimately to heal properly. 5.  Keep your follow-up appointment with me. I will scope your airway on that visit to see how things are healing and will likely already have a date set up for your repeat surgery. For emergencies:  Alma Benites.  Nir Paris, [address] [place] [address]  Cell: [phone]

## 2022-08-14 NOTE — CARE COORDINATION
Consult to [facility]. Phone call made with a referral for the pt to [facility] for care and maintenance of her PICC line. [facility] director Darien Escobedo called at [phone] with patient information and request to follow up with patient tomorrow. Education provided to patient as well on infection prevention and care for a PICC line at home with understanding demonstrated through repeat back method.

## 2022-08-14 NOTE — PLAN OF CARE
Problem: Discharge Planning  Goal: Discharge to home or other facility with appropriate resources  8/13/2022 2310 by Jose M Mendes RN  Outcome: Progressing  Flowsheets (Taken 8/13/2022 2037)  Discharge to home or other facility with appropriate resources:   Identify barriers to discharge with patient and caregiver   Identify discharge learning needs (meds, wound care, etc)     Problem: Chronic Conditions and Co-morbidities  Goal: Patient's chronic conditions and co-morbidity symptoms are monitored and maintained or improved  8/13/2022 2310 by Jose M Mendes RN  Outcome: Progressing  Flowsheets (Taken 8/13/2022 2037)  Care Plan - Patient's Chronic Conditions and Co-Morbidity Symptoms are Monitored and Maintained or Improved: Monitor and assess patient's chronic conditions and comorbid symptoms for stability, deterioration, or improvement     Problem: Pain  Goal: Verbalizes/displays adequate comfort level or baseline comfort level  8/13/2022 2310 by Jose M Mendes RN  Outcome: Progressing  Flowsheets (Taken 8/13/2022 2140)  Verbalizes/displays adequate comfort level or baseline comfort level:   Encourage patient to monitor pain and request assistance   Administer analgesics based on type and severity of pain and evaluate response     Problem: Safety - Adult  Goal: Free from fall injury  8/13/2022 2310 by Jose M Mendes RN  Outcome: Progressing  Flowsheets (Taken 8/13/2022 2138)  Free From Fall Injury: Instruct family/caregiver on patient safety     Problem: ABCDS Injury Assessment  Goal: Absence of physical injury  8/13/2022 2310 by Jose M Mendes RN  Outcome: Progressing  Flowsheets (Taken 8/13/2022 2138)  Absence of Physical Injury: Implement safety measures based on patient assessment     Problem: Respiratory - Adult  Goal: Achieves optimal ventilation and oxygenation  8/13/2022 2310 by Jose M Mendes RN  Outcome: Progressing  Flowsheets (Taken 8/13/2022 2037)  Achieves optimal ventilation and oxygenation:   Assess for changes in respiratory status   Assess and instruct to report shortness of breath or any respiratory difficulty   Assess for changes in mentation and behavior   Respiratory therapy support as indicated   Care plan reviewed with patient. Patient verbalized understanding of the plan of care and contribute to goal setting.

## 2022-08-14 NOTE — DISCHARGE SUMMARY
Physician Discharge Summary     Patient ID:  Tabby Quinonez  [id number]  47 y.o.  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 [facility]) [E08.59]    Discharge Diagnoses: Same    Admission Condition: good    Discharged Condition: good    Indication for Admission: Airway observation post airway surgery and complex hyperglycemia treatment in the context of high-dose prednisone therapy    Hospital Course: The patient has done well since her operation with expected hyperglycemia initially following the procedure that has now come under excellent control thanks to the attentive help from the hospitalist service    Consults: The hospitalist service    Significant Diagnostic Studies: Postoperative chest x-ray to rule out pneumothorax; it was negative for pneumothorax    Treatments: surgery: Suspension laryngoscopy and bronchoscopy with therapeutic dilation debridement and steroid injections    Discharge Exam:  See progress note from today for details    Disposition: home    In process/preliminary results:  Outstanding Order Results       No orders found from 7/12/2022 to 8/11/2022. Patient Instructions:   Discharge Medication List as of 8/14/2022  4:09 PM        START taking these medications    Details   insulin aspart (NOVOLOG FLEXPEN) 100 UNIT/ML injection pen Inject 20-25 Units into the skin in the morning and 20-25 Units at noon and 20-25 Units in the evening. Inject before meals. , Disp-22.5 mL, R-0Normal      glucagon 1 MG injection Inject 1 mg into the muscle See Admin Instructions Follow package directions for low blood sugar., Disp-1 kit, R-3Normal      Insulin Pen Needle 31G X 6 MM MISC DAILY Starting Sat 8/13/2022, Disp-100 each, R-3, Normal           CONTINUE these medications which have CHANGED    Details   LANTUS SOLOSTAR 100 UNIT/ML injection pen Inject 50 Units into the skin nightly, Disp-15 mL, R-0, DAWNormal           CONTINUE these medications which have NOT CHANGED    Details   albuterol (ACCUNEB) 0.63 MG/3ML nebulizer solution Take 1 ampule by nebulization every 6 hours as needed for WheezingHistorical Med      lidocaine (XYLOCAINE) 4 % external solution Apply 1 Dose topically as needed for Pain Apply topically as needed., Topical, PRN, Historical Med      Cholecalciferol (VITAMIN D3 PO) Take by mouth daily 4000 unitsHistorical Med      NONFORMULARY daily resveratrolHistorical Med      lisinopril (PRINIVIL;ZESTRIL) 10 MG tablet Take 1 tablet by mouth daily, Disp-30 tablet, R-3Normal      guaiFENesin 400 MG tablet Take 400 mg by mouth 2 times daily as needed for CoughHistorical Med      metoprolol succinate (TOPROL XL) 25 MG extended release tablet Take 1 tablet by mouth daily, Disp-30 tablet, R-3Normal      albuterol sulfate  (90 Base) MCG/ACT inhaler Inhale 2 puffs into the lungs every 6 hours as needed for Wheezing, Disp-18 g, R-3Normal      furosemide (LASIX) 40 MG tablet Take 1 tablet by mouth daily, Disp-60 tablet, R-3Normal      OXYGEN Inhale 2 L/min into the lungs continuous prn (during activities such as walking), Disp-1 Canister, R-5Print      rosuvastatin (CRESTOR) 10 MG tablet Take 10 mg by mouth dailyHistorical Med      nitroGLYCERIN (NITROSTAT) 0.4 MG SL tablet Place 0.4 mg under the tongue every 5 minutes as needed for Chest pain up to max of 3 total doses. If no relief after 1 dose, call 911.  Historical Med      aspirin 81 MG chewable tablet Take 81 mg by mouth dailyHistorical Med           STOP taking these medications       pseudoephedrine-guaiFENesin 120-1200 MG TB12 Comments:   Reason for Stopping: insulin NPH (HUMULIN N;NOVOLIN N) 100 UNIT/ML injection vial Comments:   Reason for Stopping:         hydrOXYzine (VISTARIL) 25 MG capsule Comments:   Reason for Stopping:             Activity: activity as tolerated  Diet: diabetic diet  Wound Care: as directed    Follow-up with Dr. Miladys Carpio in 1 week. Signed:  Kathya Paz.  Miladys Carpio MD  8/14/2022  4:42 PM

## 2022-08-14 NOTE — PLAN OF CARE
Problem: Discharge Planning  Goal: Discharge to home or other facility with appropriate resources  Recent Flowsheet Documentation  Taken 8/14/2022 1336 by Preethi Cheng RN  Discharge to home or other facility with appropriate resources:   Identify barriers to discharge with patient and caregiver   Arrange for needed discharge resources and transportation as appropriate   Refer to discharge planning if patient needs post-hospital services based on physician order or complex needs related to functional status, cognitive ability or social support system  8/14/2022 1316 by Daniel Ramirez RN  Outcome: Completed     Problem: Chronic Conditions and Co-morbidities  Goal: Patient's chronic conditions and co-morbidity symptoms are monitored and maintained or improved  8/14/2022 1316 by Daniel Ramirez RN  Outcome: Completed     Problem: Pain  Goal: Verbalizes/displays adequate comfort level or baseline comfort level  8/14/2022 1316 by Daniel Ramirez RN  Outcome: Completed     Problem: Safety - Adult  Goal: Free from fall injury  8/14/2022 1316 by Daniel Ramirez RN  Outcome: Completed     Problem: ABCDS Injury Assessment  Goal: Absence of physical injury  8/14/2022 1316 by Daniel Ramirez RN  Outcome: Completed     Problem: Respiratory - Adult  Goal: Achieves optimal ventilation and oxygenation  8/14/2022 1316 by Daneil Ramirez RN  Outcome: Completed     Care plan reviewed with patient and spouse. Patient and spouse verbalize understanding of the plan of care and contribute to goal setting.

## 2022-08-15 ENCOUNTER — TELEPHONE (OUTPATIENT)
Dept: ENT CLINIC | Age: 72
End: 2022-08-15

## 2022-08-15 RX ORDER — PREDNISONE 10 MG/1
15 TABLET ORAL 2 TIMES DAILY
Qty: 270 TABLET | Refills: 0 | Status: SHIPPED | OUTPATIENT
Start: 2022-08-15 | End: 2022-11-13

## 2022-08-15 NOTE — CARE COORDINATION
Late entry  08/15/22  1:41 PM EDT      Call received from [facility] this morning that this patient was discharged home with a PICC line. They received the information with the referral. They state that the patient will need a home infusion company. Spoke with [facility]. They are not able to accept her insurance. All information was then sent to [facility] they state they are unable to accept the patient for only PICC care. [facility]. Spoke with her  and they are open to going to [facility] for outpatient nursing services. CM Called and spoke with [facility] scheduling. They will need orders from the physician here, Dr Shannon Finney her PCP will need to cosign them if he agrees to follow. Those orders would then need faxed to them.

## 2022-08-22 ENCOUNTER — HOSPITAL ENCOUNTER (OUTPATIENT)
Dept: NURSING | Age: 72
Discharge: HOME | End: 2022-08-22
Payer: MEDICARE

## 2022-08-22 ENCOUNTER — PREP FOR PROCEDURE (OUTPATIENT)
Dept: ENT CLINIC | Age: 72
End: 2022-08-22

## 2022-08-22 ENCOUNTER — OFFICE VISIT (OUTPATIENT)
Dept: ENT CLINIC | Age: 72
End: 2022-08-22
Payer: MEDICARE

## 2022-08-22 VITALS
OXYGEN SATURATION: 100 % | RESPIRATION RATE: 20 BRPM | SYSTOLIC BLOOD PRESSURE: 129 MMHG | DIASTOLIC BLOOD PRESSURE: 68 MMHG | HEART RATE: 65 BPM

## 2022-08-22 VITALS
TEMPERATURE: 97.5 F | DIASTOLIC BLOOD PRESSURE: 84 MMHG | WEIGHT: 204 LBS | RESPIRATION RATE: 20 BRPM | BODY MASS INDEX: 37.31 KG/M2 | OXYGEN SATURATION: 99 % | HEART RATE: 65 BPM | SYSTOLIC BLOOD PRESSURE: 126 MMHG

## 2022-08-22 DIAGNOSIS — Z01.818 PRE-OP TESTING: Primary | ICD-10-CM

## 2022-08-22 DIAGNOSIS — Z93.0 TRACHEOSTOMY DEPENDENCE (HCC): ICD-10-CM

## 2022-08-22 DIAGNOSIS — R49.0 HOARSENESS: ICD-10-CM

## 2022-08-22 DIAGNOSIS — J38.6 POSTERIOR GLOTTIC STENOSIS: Primary | ICD-10-CM

## 2022-08-22 DIAGNOSIS — R06.00 DYSPNEA AND RESPIRATORY ABNORMALITIES: ICD-10-CM

## 2022-08-22 DIAGNOSIS — J95.03 TRACHEAL STENOSIS DUE TO TRACHEOSTOMY (HCC): ICD-10-CM

## 2022-08-22 DIAGNOSIS — Z01.818 PRE-OP TESTING: ICD-10-CM

## 2022-08-22 DIAGNOSIS — R06.89 DYSPNEA AND RESPIRATORY ABNORMALITIES: ICD-10-CM

## 2022-08-22 LAB
ALBUMIN SERPL-MCNC: 4.7 G/DL (ref 3.5–5.1)
ALP BLD-CCNC: 87 U/L (ref 38–126)
ALT SERPL-CCNC: 7 U/L (ref 11–66)
ANION GAP SERPL CALCULATED.3IONS-SCNC: 15 MEQ/L (ref 8–16)
AST SERPL-CCNC: 12 U/L (ref 5–40)
BASOPHILS # BLD: 0.4 %
BASOPHILS ABSOLUTE: 0.1 THOU/MM3 (ref 0–0.1)
BILIRUB SERPL-MCNC: 0.2 MG/DL (ref 0.3–1.2)
BUN BLDV-MCNC: 28 MG/DL (ref 7–22)
CALCIUM SERPL-MCNC: 10.1 MG/DL (ref 8.5–10.5)
CHLORIDE BLD-SCNC: 101 MEQ/L (ref 98–111)
CO2: 26 MEQ/L (ref 23–33)
CREAT SERPL-MCNC: 0.8 MG/DL (ref 0.4–1.2)
EOSINOPHIL # BLD: 0 %
EOSINOPHILS ABSOLUTE: 0 THOU/MM3 (ref 0–0.4)
ERYTHROCYTE [DISTWIDTH] IN BLOOD BY AUTOMATED COUNT: 12.7 % (ref 11.5–14.5)
ERYTHROCYTE [DISTWIDTH] IN BLOOD BY AUTOMATED COUNT: 40.2 FL (ref 35–45)
GFR SERPL CREATININE-BSD FRML MDRD: 71 ML/MIN/1.73M2
GLUCOSE BLD-MCNC: 193 MG/DL (ref 70–108)
HCT VFR BLD CALC: 40.3 % (ref 37–47)
HEMOGLOBIN: 13 GM/DL (ref 12–16)
IMMATURE GRANS (ABS): 0.34 THOU/MM3 (ref 0–0.07)
IMMATURE GRANULOCYTES: 2 %
LYMPHOCYTES # BLD: 9.6 %
LYMPHOCYTES ABSOLUTE: 1.6 THOU/MM3 (ref 1–4.8)
MCH RBC QN AUTO: 28.4 PG (ref 26–33)
MCHC RBC AUTO-ENTMCNC: 32.3 GM/DL (ref 32.2–35.5)
MCV RBC AUTO: 88.2 FL (ref 81–99)
MONOCYTES # BLD: 3.4 %
MONOCYTES ABSOLUTE: 0.6 THOU/MM3 (ref 0.4–1.3)
NUCLEATED RED BLOOD CELLS: 0 /100 WBC
PLATELET # BLD: 238 THOU/MM3 (ref 130–400)
PMV BLD AUTO: 10.6 FL (ref 9.4–12.4)
POTASSIUM SERPL-SCNC: 4.9 MEQ/L (ref 3.5–5.2)
RBC # BLD: 4.57 MILL/MM3 (ref 4.2–5.4)
SEG NEUTROPHILS: 84.6 %
SEGMENTED NEUTROPHILS ABSOLUTE COUNT: 14.4 THOU/MM3 (ref 1.8–7.7)
SODIUM BLD-SCNC: 142 MEQ/L (ref 135–145)
TOTAL PROTEIN: 6.8 G/DL (ref 6.1–8)
WBC # BLD: 17 THOU/MM3 (ref 4.8–10.8)

## 2022-08-22 PROCEDURE — 36592 COLLECT BLOOD FROM PICC: CPT

## 2022-08-22 PROCEDURE — 3017F COLORECTAL CA SCREEN DOC REV: CPT | Performed by: OTOLARYNGOLOGY

## 2022-08-22 PROCEDURE — 85025 COMPLETE CBC W/AUTO DIFF WBC: CPT

## 2022-08-22 PROCEDURE — 1036F TOBACCO NON-USER: CPT | Performed by: OTOLARYNGOLOGY

## 2022-08-22 PROCEDURE — 31615 TRCHEOBRNCHSC EST TRACHS INC: CPT | Performed by: OTOLARYNGOLOGY

## 2022-08-22 PROCEDURE — 31575 DIAGNOSTIC LARYNGOSCOPY: CPT | Performed by: OTOLARYNGOLOGY

## 2022-08-22 PROCEDURE — 2580000003 HC RX 258: Performed by: OTOLARYNGOLOGY

## 2022-08-22 PROCEDURE — G8400 PT W/DXA NO RESULTS DOC: HCPCS | Performed by: OTOLARYNGOLOGY

## 2022-08-22 PROCEDURE — 1111F DSCHRG MED/CURRENT MED MERGE: CPT | Performed by: OTOLARYNGOLOGY

## 2022-08-22 PROCEDURE — 99215 OFFICE O/P EST HI 40 MIN: CPT | Performed by: OTOLARYNGOLOGY

## 2022-08-22 PROCEDURE — 1090F PRES/ABSN URINE INCON ASSESS: CPT | Performed by: OTOLARYNGOLOGY

## 2022-08-22 PROCEDURE — 80053 COMPREHEN METABOLIC PANEL: CPT

## 2022-08-22 PROCEDURE — G8417 CALC BMI ABV UP PARAM F/U: HCPCS | Performed by: OTOLARYNGOLOGY

## 2022-08-22 PROCEDURE — G8427 DOCREV CUR MEDS BY ELIG CLIN: HCPCS | Performed by: OTOLARYNGOLOGY

## 2022-08-22 PROCEDURE — 1124F ACP DISCUSS-NO DSCNMKR DOCD: CPT | Performed by: OTOLARYNGOLOGY

## 2022-08-22 RX ORDER — SODIUM CHLORIDE 0.9 % (FLUSH) 0.9 %
10 SYRINGE (ML) INJECTION PRN
Status: DISCONTINUED | OUTPATIENT
Start: 2022-08-22 | End: 2022-08-23 | Stop reason: HOSPADM

## 2022-08-22 RX ADMIN — SODIUM CHLORIDE, PRESERVATIVE FREE 10 ML: 5 INJECTION INTRAVENOUS at 13:26

## 2022-08-22 NOTE — PROGRESS NOTES
[facility] [place] [facility]  [facility] EAR, NOSE AND THROAT  [facility]  Dept: [phone]  Dept Fax: [phone]  Loc: [phone]    Lyndsay Sher is a 73 y.o. female who was referred by No ref. provider found for:  Chief Complaint   Patient presents with    Post-Op Check     Patient here for post op exam.        HPI:     Lyndsay Sher is a 73 y.o. female status post subglottic and peristomal dilation and debridement for near complete obliteration of her subglottic airway associated with a long-term tracheostomy. The patient is here today with her  reporting marked improvement in her ability to tolerate her Passy-[place] valve. She apparently is also able to talk without even holding her finger on the trach cannula which indicates a dramatic improvement to her overall breathing comfort. Of note is the fact that she hurt her lower back on the chair over the past several days so is having difficulty with ambulation. History: Allergies   Allergen Reactions    Metformin And Related Other (See Comments)     diarrhea    Codeine Nausea And Vomiting    Meperidine Hcl Nausea And Vomiting    Sulfa Antibiotics Nausea And Vomiting    Sumatriptan Nausea And Vomiting     Current Outpatient Medications   Medication Sig Dispense Refill    predniSONE (DELTASONE) 10 MG tablet Take 1.5 tablets by mouth in the morning and 1.5 tablets before bedtime. 270 tablet 0    LANTUS SOLOSTAR 100 UNIT/ML injection pen Inject 50 Units into the skin nightly 15 mL 0    insulin aspart (NOVOLOG FLEXPEN) 100 UNIT/ML injection pen Inject 20-25 Units into the skin in the morning and 20-25 Units at noon and 20-25 Units in the evening. Inject before meals. 22.5 mL 0    glucagon 1 MG injection Inject 1 mg into the muscle See Admin Instructions Follow package directions for low blood sugar.  1 kit 3    Insulin Pen Needle 31G X 6 MM MISC 1 each by Does not apply route daily 100 each 3 albuterol (ACCUNEB) 0.63 MG/3ML nebulizer solution Take 1 ampule by nebulization every 6 hours as needed for Wheezing      lidocaine (XYLOCAINE) 4 % external solution Apply 1 Dose topically as needed for Pain Apply topically as needed. Cholecalciferol (VITAMIN D3 PO) Take by mouth daily 4000 units      NONFORMULARY daily resveratrol      lisinopril (PRINIVIL;ZESTRIL) 10 MG tablet Take 1 tablet by mouth daily 30 tablet 3    guaiFENesin 400 MG tablet Take 400 mg by mouth 2 times daily as needed for Cough      metoprolol succinate (TOPROL XL) 25 MG extended release tablet Take 1 tablet by mouth daily 30 tablet 3    albuterol sulfate  (90 Base) MCG/ACT inhaler Inhale 2 puffs into the lungs every 6 hours as needed for Wheezing 18 g 3    furosemide (LASIX) 40 MG tablet Take 1 tablet by mouth daily 60 tablet 3    OXYGEN Inhale 2 L/min into the lungs continuous prn (during activities such as walking) 1 Canister 5    rosuvastatin (CRESTOR) 10 MG tablet Take 10 mg by mouth daily      nitroGLYCERIN (NITROSTAT) 0.4 MG SL tablet Place 0.4 mg under the tongue every 5 minutes as needed for Chest pain up to max of 3 total doses. If no relief after 1 dose, call 911.       aspirin 81 MG chewable tablet Take 81 mg by mouth daily       No current facility-administered medications for this visit.      Facility-Administered Medications Ordered in Other Visits   Medication Dose Route Frequency Provider Last Rate Last Admin    sodium chloride flush 0.9 % injection 10 mL  10 mL IntraVENous PRN Lucrecia Libman, MD   10 mL at 22 1326     Past Medical History:   Diagnosis Date    Arthritis     Coronary artery disease     COVID     Hyperlipidemia     Primary hypertension     Type 2 diabetes mellitus ([facility] [address]       Past Surgical History:   Procedure Laterality Date    CARDIAC SURGERY      CATARACT REMOVAL Bilateral      SECTION      3 times    CORONARY ANGIOPLASTY WITH STENT PLACEMENT      stenting twice EYE SURGERY      HIATAL HERNIA REPAIR      late 1990s    HYSTERECTOMY, TOTAL ABDOMINAL (CERVIX REMOVED)      in 1990s    LARYNGOSCOPY N/A 3/22/2022    SUSPENSON LARYNGOSCOPY WITH JET VENT, DILATION performed by Darnell Mars MD at [address] N/A 3/28/2022    SUSPENSION MICROLARYNGOSCOPY WITH BALLOON DILATION AND JET VENT, KENALOG INJECTION performed by Darnell Mars MD at [address] N/A 4/7/2022    Suspension Laryngoscopy  Lateral of Right True Vocal Cord, With Steroid Injection of Larynx And Tracheostomy Tube Change, debridement performed by Per Mix MD at [address] N/A 8/10/2022    TRANSORAL LARYNGOPLASTY WITH LEFT PARTIAL ARYTENODECTOMY AND POSS LATERALIZATION, ADVANCEMENT FLAP RECONSTRUCTION WITH JET VENT,THERAPUTIC BRONCHOSCOPY WITH DEBRIEDMENT,WITH BALLOON DILITATION performed by Per Mix MD at [address]      in [address] [place] [address] N/A 4/1/2022    TRACHEOTOMY TUBE REPLACEMENT performed by Darnell Mars MD at [facility] History   Problem Relation Age of Onset    Parkinson's Disease Father     Lung Cancer Father      Social History     Tobacco Use    Smoking status: Never    Smokeless tobacco: Never   Substance Use Topics    Alcohol use: Not Currently     Comment: drinks 1 glass of wine cooler or wine about 3 times per year        Subjective:      Review of Systems  Rest of review of systems are negative, except as noted in HPI. Objective:     /84 (Site: Right Lower Arm, Position: Sitting)   Pulse 65   Temp 97.5 °F (36.4 °C) (Infrared)   Resp 20   Wt 204 lb (92.5 kg)   LMP  (LMP Unknown)   SpO2 99%   BMI 37.31 kg/m²     Physical Exam       On general physical exam the patient is pleasant alert and cooperative adult female in no acute distress. She is complaining of lower back pain.   She is comfortably speaking with her Passy-Janeth valve, something she could not do while in the hospital.  Her voice is low in pitch but easy to understand. She is in no airway distress. There are no signs of recent bleeding or infection of the tracheostomy; specifically there is no foul odor or blood staining of her dressing. Procedure: Flexible laryngoscopy  Indication: The patient has a history of upper airway obstruction and warrants an endoscopy to see whether or not adequate to normal movement of the true vocal folds has returned. This is a critical minimum and her neck stages of care. Findings:  The patient's larynx was well visualized. The left true vocal fold was across the midline opposing the right side and preventing more than a very small entry glottic aperture to be evident. With exhalation through the glottis, this would push out the left side to a small degree providing a gap between the 2 structures and allowing the patient to tolerate having the Passy-[place] valve. Right-sided mobility could not be assessed but given the high likelihood that the left side is crossing the midline, it is probably relatively mobile. Flexible laryngoscopy images:                    Procedure: Trend stomal bronchoscopy  Indications: The patient has trend stomal and subglottic airway stenosis and warrants an interval endoscopic evaluation to determine if this process is improving and what more can be done to secure her airway. Findings: The patient's peristomal trachea was widely patent and free of obstructing soft tissues up to the level of the suprastomal shelf which was noted to be well  from the posterior wall. No signs of recent bleeding were seen. No mucopurulence was seen. No impending plugs were seen distally. No tracheal erosion at the tip of where the cannula was resting could be seen. The patient tolerated the procedure well. And the cannula was replaced without difficulty or vital sign instability. Trend stomal bronchoscopy images:                        Vitals reviewed.     XR CHEST PORTABLE    Result Date: 8/11/2022  1. The left PICC line has its tip overlying the region of the superior vena cava. 2. The tracheostomy tube terminates 5.6 cm above the telma at the level of the clavicles. 3. Mild cardiomegaly. 4. No focal pulmonary infiltrate. 5. Otherwise, there has been no other significant interval change in the radiographic appearance of the chest  from 8/10/2022 at 1245 hours. **This report has been created using voice recognition software. It may contain minor errors which are inherent in voice recognition technology. ** Final report electronically signed by Dr Betty Kolb on 8/11/2022 3:11 PM    XR CHEST PORTABLE    Result Date: 8/10/2022  1. The tracheostomy tube terminates 3.2 cm above the telma just above the level of the clavicles. 2. There is now increased interstitial markings that may be postprocedural or due to the low lung volumes. 3. Marked cardiomegaly. 4. No pneumothorax. **This report has been created using voice recognition software. It may contain minor errors which are inherent in voice recognition technology. ** Final report electronically signed by Dr Betty Kolb on 8/10/2022 3:05 PM     Lab Results   Component Value Date/Time     08/22/2022 01:30 PM     08/12/2022 05:20 AM     06/08/2022 04:11 AM    K 4.9 08/22/2022 01:30 PM    K 4.4 08/12/2022 05:20 AM    K 4.3 08/10/2022 08:49 AM    K 4.0 06/08/2022 04:11 AM    K 3.8 06/06/2022 05:20 PM    K 5.2 04/13/2022 11:36 AM     08/22/2022 01:30 PM     08/12/2022 05:20 AM     06/08/2022 04:11 AM    CO2 26 08/22/2022 01:30 PM    CO2 23 08/12/2022 05:20 AM    CO2 27 06/08/2022 04:11 AM    BUN 28 08/22/2022 01:30 PM    BUN 29 08/12/2022 05:20 AM    BUN 18 06/08/2022 04:11 AM    CREATININE 0.8 08/22/2022 01:30 PM    CREATININE 1.0 08/12/2022 05:20 AM    CREATININE 1.1 08/11/2022 05:04 AM    CALCIUM 10.1 08/22/2022 01:30 PM    CALCIUM 9.1 08/12/2022 05:20 AM    CALCIUM 9.1 06/08/2022 04:11 AM    PROT 6.8 08/22/2022 01:30 PM    PROT 6.1 06/06/2022 05:20 PM    PROT 7.6 03/18/2022 07:22 PM    LABALBU 4.7 08/22/2022 01:30 PM    LABALBU 3.9 06/06/2022 05:20 PM    LABALBU 4.3 03/18/2022 07:22 PM    BILITOT 0.2 08/22/2022 01:30 PM    BILITOT 0.2 06/06/2022 05:20 PM    BILITOT 0.2 03/18/2022 07:22 PM    ALKPHOS 87 08/22/2022 01:30 PM    ALKPHOS 66 06/06/2022 05:20 PM    ALKPHOS 101 03/18/2022 07:22 PM    AST 12 08/22/2022 01:30 PM    AST 17 06/06/2022 05:20 PM    AST 16 03/18/2022 07:22 PM    ALT 7 08/22/2022 01:30 PM    ALT 15 06/06/2022 05:20 PM    ALT 9 03/18/2022 07:22 PM       All of the past medical history, past surgical history, family history,social history, allergies and current medications were reviewed with the patient. Assessment & Plan   Diagnoses and all orders for this visit:     Diagnosis Orders   1. Posterior glottic stenosis  Miscellaneous Surgery      2. Tracheostomy dependence ([address]  Miscellaneous Surgery      3. Hoarseness  Miscellaneous Surgery      4. Dyspnea and respiratory abnormalities  Miscellaneous Surgery      5. 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 She will need a definitive left true vocal fold and arytenoid lateralization in order to make any progress towards decannulation. In addition she needs her neck stage of subglottic and peristomal laryngeal and tracheal dilation and debridement. She has made substantial progress in this regard now tolerating her Passy-Janeth valve for hours at a time. That is an excellent finding. She also has low back pain of unknown etiology is getting worked up for that and hopefully will be less symptomatic bedtime her surgery is planned.   I explained all this in detail to the patient and her  who was with us the entire time to their satisfaction    I reviewed the indications benefits limitations and risks of proceeding with a left-sided partial arytenoidectomy and lateralization with an externally placed, likely Prolene based suture placement so as to be able to reverse it if the patient is unable to protect her airway as a result of it. In addition I plan to extend the work in the subglottis and peristomal tracheal areas to further consolidate the improvement in her overall airway patency. She is to stay on her 30 mg/day dosage of prednisone at a regimen of 15 mg twice daily to help reduce the challenges of controlling her blood glucoses despite this glucocorticoid effect. The patient and her  reported being pleased with the outcome of the visit and the planned interventions and being willing to proceed as such. I spent over 45 minutes of face-to-face time with the patient well more than half of which was dedicated to reviewing her complex history and planning this comprehensive treatment program.    Return in about 6 weeks (around 10/3/2022) for Follow-up evaluation and possible endoscopy. **This report has been created using voice recognition software. It may contain minor errors which are inherent in voice recognition technology. **

## 2022-08-22 NOTE — PROGRESS NOTES
1325: Patient arrived in wheelchair with  for blood work. Patient rights and responsibilities offered to patient. Patient has a picc in left upper arm. Dressing clean, dry, intact. Blood collected per order. Picc line flushed. AVS reviewed with patient, voiced understanding. Patient discharged in wheelchair.                _m___ Safety:       (Environmental)  [place] to environment  Ensure ID band is correct and in place/ allergy band as needed  Assess for fall risk  Initiate fall precautions as applicable (fall band, side rails, etc.)  Call light within reach  Bed in low position/ wheels locked    _m___ Pain:       Assess pain level and characteristics  Administer analgesics as ordered  Assess effectiveness of pain management and report to MD as needed    _m___ Knowledge Deficit:  Assess baseline knowledge  Provide teaching at level of understanding  Provide teaching via preferred learning method  Evaluate teaching effectiveness    _m___ Hemodynamic/Respiratory Status:       (Pre and Post Procedure Monitoring)  Assess/Monitor vital signs and LOC  Assess Baseline SpO2 prior to any sedation  Obtain weight/height  Assess vital signs/ LOC until patient meets discharge criteria  Monitor procedure site and notify MD of any issues    _m___ Infection-Risk of Central Venous Catheter:  Monitor for infection signs and symptoms (catheter site redness, temperature elevation, etc)  Assess for infection risks  Educate regarding infection prevention  Manage central venous catheter (flushes/ dressing changes per protocol)

## 2022-08-24 NOTE — PROGRESS NOTES
Left telephone msg with Maggie DOSS- just want to make sure Dr. Matthew Terrazas saw the elevated WBC of 17 on 8/22/22. Thank you.

## 2022-08-26 ENCOUNTER — PREP FOR PROCEDURE (OUTPATIENT)
Dept: ENT CLINIC | Age: 72
End: 2022-08-26

## 2022-08-26 ENCOUNTER — TELEPHONE (OUTPATIENT)
Dept: ENT CLINIC | Age: 72
End: 2022-08-26

## 2022-08-26 RX ORDER — SODIUM CHLORIDE 9 MG/ML
INJECTION, SOLUTION INTRAVENOUS PRN
Status: CANCELLED | OUTPATIENT
Start: 2022-08-26

## 2022-08-26 RX ORDER — SODIUM CHLORIDE 0.9 % (FLUSH) 0.9 %
5-40 SYRINGE (ML) INJECTION PRN
Status: CANCELLED | OUTPATIENT
Start: 2022-08-26

## 2022-08-26 RX ORDER — IPRATROPIUM BROMIDE AND ALBUTEROL SULFATE 2.5; .5 MG/3ML; MG/3ML
1 SOLUTION RESPIRATORY (INHALATION) EVERY 4 HOURS PRN
Status: CANCELLED | OUTPATIENT
Start: 2022-08-26

## 2022-08-26 RX ORDER — SODIUM CHLORIDE 0.9 % (FLUSH) 0.9 %
5-40 SYRINGE (ML) INJECTION EVERY 12 HOURS SCHEDULED
Status: CANCELLED | OUTPATIENT
Start: 2022-08-26

## 2022-08-26 NOTE — TELEPHONE ENCOUNTER
Brigid Mahmood (on HIPAA) called to find out if Almaz Smith can take a new medication her PCP prescribed for some back pain prior to her surgery? Tizanidine    I spoke to Keron Chavez in [place] and she said they only require that to be stopped the morning of surgery. This was relayed to Brigid Mahmood.

## 2022-08-30 ENCOUNTER — ANESTHESIA EVENT (OUTPATIENT)
Dept: OPERATING ROOM | Age: 72
DRG: 167 | End: 2022-08-30
Payer: MEDICARE

## 2022-08-31 ENCOUNTER — HOSPITAL ENCOUNTER (INPATIENT)
Age: 72
LOS: 1 days | Discharge: HOME | DRG: 167 | End: 2022-09-01
Attending: OTOLARYNGOLOGY | Admitting: OTOLARYNGOLOGY
Payer: MEDICARE

## 2022-08-31 ENCOUNTER — ANESTHESIA (OUTPATIENT)
Dept: OPERATING ROOM | Age: 72
DRG: 167 | End: 2022-08-31
Payer: MEDICARE

## 2022-08-31 DIAGNOSIS — J95.03 TRACHEAL STENOSIS DUE TO TRACHEOSTOMY (HCC): ICD-10-CM

## 2022-08-31 DIAGNOSIS — J38.6 LARYNGEAL STENOSIS: ICD-10-CM

## 2022-08-31 PROBLEM — J98.8 AIRWAY COMPROMISE: Status: ACTIVE | Noted: 2022-08-31

## 2022-08-31 LAB
GLUCOSE BLD-MCNC: 107 MG/DL (ref 70–108)
GLUCOSE BLD-MCNC: 299 MG/DL (ref 70–108)
GLUCOSE BLD-MCNC: 92 MG/DL (ref 70–108)

## 2022-08-31 PROCEDURE — 2709999900 HC NON-CHARGEABLE SUPPLY: Performed by: OTOLARYNGOLOGY

## 2022-08-31 PROCEDURE — 31552 LARYNGOPLASTY LARYNGEAL STEN: CPT | Performed by: OTOLARYNGOLOGY

## 2022-08-31 PROCEDURE — 2580000003 HC RX 258: Performed by: OTOLARYNGOLOGY

## 2022-08-31 PROCEDURE — 3600000004 HC SURGERY LEVEL 4 BASE: Performed by: OTOLARYNGOLOGY

## 2022-08-31 PROCEDURE — 6360000002 HC RX W HCPCS: Performed by: OTOLARYNGOLOGY

## 2022-08-31 PROCEDURE — 2720000010 HC SURG SUPPLY STERILE: Performed by: OTOLARYNGOLOGY

## 2022-08-31 PROCEDURE — 3700000000 HC ANESTHESIA ATTENDED CARE: Performed by: OTOLARYNGOLOGY

## 2022-08-31 PROCEDURE — 0CBS8ZZ EXCISION OF LARYNX, VIA NATURAL OR ARTIFICIAL OPENING ENDOSCOPIC: ICD-10-PCS | Performed by: OTOLARYNGOLOGY

## 2022-08-31 PROCEDURE — 7100000001 HC PACU RECOVERY - ADDTL 15 MIN: Performed by: OTOLARYNGOLOGY

## 2022-08-31 PROCEDURE — 31630 BRONCHOSCOPY DILATE/FX REPR: CPT | Performed by: OTOLARYNGOLOGY

## 2022-08-31 PROCEDURE — 0BJ08ZZ INSPECTION OF TRACHEOBRONCHIAL TREE, VIA NATURAL OR ARTIFICIAL OPENING ENDOSCOPIC: ICD-10-PCS | Performed by: OTOLARYNGOLOGY

## 2022-08-31 PROCEDURE — 6360000002 HC RX W HCPCS: Performed by: STUDENT IN AN ORGANIZED HEALTH CARE EDUCATION/TRAINING PROGRAM

## 2022-08-31 PROCEDURE — 94761 N-INVAS EAR/PLS OXIMETRY MLT: CPT

## 2022-08-31 PROCEDURE — 99223 1ST HOSP IP/OBS HIGH 75: CPT | Performed by: PHYSICIAN ASSISTANT

## 2022-08-31 PROCEDURE — 31561 LARYNSCOP REMVE CART + SCOP: CPT | Performed by: OTOLARYNGOLOGY

## 2022-08-31 PROCEDURE — 88305 TISSUE EXAM BY PATHOLOGIST: CPT

## 2022-08-31 PROCEDURE — 31641 BRONCHOSCOPY TREAT BLOCKAGE: CPT | Performed by: OTOLARYNGOLOGY

## 2022-08-31 PROCEDURE — 6370000000 HC RX 637 (ALT 250 FOR IP): Performed by: OTOLARYNGOLOGY

## 2022-08-31 PROCEDURE — 2500000003 HC RX 250 WO HCPCS: Performed by: REGISTERED NURSE

## 2022-08-31 PROCEDURE — C1726 CATH, BAL DIL, NON-VASCULAR: HCPCS | Performed by: OTOLARYNGOLOGY

## 2022-08-31 PROCEDURE — 3600000014 HC SURGERY LEVEL 4 ADDTL 15MIN: Performed by: OTOLARYNGOLOGY

## 2022-08-31 PROCEDURE — 6370000000 HC RX 637 (ALT 250 FOR IP): Performed by: PHYSICIAN ASSISTANT

## 2022-08-31 PROCEDURE — 0BB18ZZ EXCISION OF TRACHEA, VIA NATURAL OR ARTIFICIAL OPENING ENDOSCOPIC: ICD-10-PCS | Performed by: OTOLARYNGOLOGY

## 2022-08-31 PROCEDURE — 3700000001 HC ADD 15 MINUTES (ANESTHESIA): Performed by: OTOLARYNGOLOGY

## 2022-08-31 PROCEDURE — 6360000002 HC RX W HCPCS

## 2022-08-31 PROCEDURE — 6370000000 HC RX 637 (ALT 250 FOR IP): Performed by: STUDENT IN AN ORGANIZED HEALTH CARE EDUCATION/TRAINING PROGRAM

## 2022-08-31 PROCEDURE — 31529 LARYNGOSCOPY AND DILATION: CPT | Performed by: OTOLARYNGOLOGY

## 2022-08-31 PROCEDURE — 0CQS8ZZ REPAIR LARYNX, VIA NATURAL OR ARTIFICIAL OPENING ENDOSCOPIC: ICD-10-PCS | Performed by: OTOLARYNGOLOGY

## 2022-08-31 PROCEDURE — 2700000000 HC OXYGEN THERAPY PER DAY

## 2022-08-31 PROCEDURE — 7100000000 HC PACU RECOVERY - FIRST 15 MIN: Performed by: OTOLARYNGOLOGY

## 2022-08-31 PROCEDURE — 2060000000 HC ICU INTERMEDIATE R&B

## 2022-08-31 PROCEDURE — 6360000002 HC RX W HCPCS: Performed by: REGISTERED NURSE

## 2022-08-31 PROCEDURE — 82948 REAGENT STRIP/BLOOD GLUCOSE: CPT

## 2022-08-31 RX ORDER — METOPROLOL TARTRATE 5 MG/5ML
INJECTION INTRAVENOUS PRN
Status: DISCONTINUED | OUTPATIENT
Start: 2022-08-31 | End: 2022-08-31 | Stop reason: SDUPTHER

## 2022-08-31 RX ORDER — HYDRALAZINE HYDROCHLORIDE 20 MG/ML
INJECTION INTRAMUSCULAR; INTRAVENOUS PRN
Status: DISCONTINUED | OUTPATIENT
Start: 2022-08-31 | End: 2022-08-31 | Stop reason: SDUPTHER

## 2022-08-31 RX ORDER — FEXOFENADINE HCL 180 MG/1
TABLET ORAL 2 TIMES DAILY
Status: CANCELLED | OUTPATIENT
Start: 2022-08-31

## 2022-08-31 RX ORDER — INSULIN LISPRO 100 [IU]/ML
10 INJECTION, SOLUTION INTRAVENOUS; SUBCUTANEOUS 2 TIMES DAILY WITH MEALS
Status: DISCONTINUED | OUTPATIENT
Start: 2022-09-01 | End: 2022-09-01 | Stop reason: HOSPADM

## 2022-08-31 RX ORDER — PROPOFOL 10 MG/ML
INJECTION, EMULSION INTRAVENOUS PRN
Status: DISCONTINUED | OUTPATIENT
Start: 2022-08-31 | End: 2022-08-31 | Stop reason: SDUPTHER

## 2022-08-31 RX ORDER — SODIUM CHLORIDE 0.9 % (FLUSH) 0.9 %
5-40 SYRINGE (ML) INJECTION EVERY 12 HOURS SCHEDULED
Status: DISCONTINUED | OUTPATIENT
Start: 2022-08-31 | End: 2022-08-31 | Stop reason: HOSPADM

## 2022-08-31 RX ORDER — INSULIN LISPRO 100 [IU]/ML
0-4 INJECTION, SOLUTION INTRAVENOUS; SUBCUTANEOUS NIGHTLY
Status: DISCONTINUED | OUTPATIENT
Start: 2022-08-31 | End: 2022-09-01 | Stop reason: HOSPADM

## 2022-08-31 RX ORDER — SODIUM CHLORIDE 0.9 % (FLUSH) 0.9 %
5-40 SYRINGE (ML) INJECTION PRN
Status: DISCONTINUED | OUTPATIENT
Start: 2022-08-31 | End: 2022-08-31 | Stop reason: HOSPADM

## 2022-08-31 RX ORDER — OXYCODONE HYDROCHLORIDE 5 MG/1
5 TABLET ORAL EVERY 4 HOURS PRN
Status: DISCONTINUED | OUTPATIENT
Start: 2022-08-31 | End: 2022-09-01 | Stop reason: HOSPADM

## 2022-08-31 RX ORDER — ONDANSETRON 2 MG/ML
INJECTION INTRAMUSCULAR; INTRAVENOUS PRN
Status: DISCONTINUED | OUTPATIENT
Start: 2022-08-31 | End: 2022-08-31 | Stop reason: SDUPTHER

## 2022-08-31 RX ORDER — FENTANYL CITRATE 50 UG/ML
50 INJECTION, SOLUTION INTRAMUSCULAR; INTRAVENOUS EVERY 5 MIN PRN
Status: DISCONTINUED | OUTPATIENT
Start: 2022-08-31 | End: 2022-08-31 | Stop reason: HOSPADM

## 2022-08-31 RX ORDER — ALBUTEROL SULFATE 2.5 MG/3ML
0.63 SOLUTION RESPIRATORY (INHALATION) EVERY 6 HOURS PRN
Status: DISCONTINUED | OUTPATIENT
Start: 2022-08-31 | End: 2022-09-01 | Stop reason: HOSPADM

## 2022-08-31 RX ORDER — ACETAMINOPHEN 325 MG/1
650 TABLET ORAL EVERY 4 HOURS PRN
Status: DISCONTINUED | OUTPATIENT
Start: 2022-08-31 | End: 2022-08-31

## 2022-08-31 RX ORDER — ONDANSETRON 4 MG/1
4 TABLET, ORALLY DISINTEGRATING ORAL EVERY 8 HOURS PRN
Status: DISCONTINUED | OUTPATIENT
Start: 2022-08-31 | End: 2022-09-01 | Stop reason: HOSPADM

## 2022-08-31 RX ORDER — IBUPROFEN 400 MG/1
400 TABLET ORAL EVERY 6 HOURS PRN
Status: DISCONTINUED | OUTPATIENT
Start: 2022-08-31 | End: 2022-09-01 | Stop reason: HOSPADM

## 2022-08-31 RX ORDER — ASPIRIN 81 MG/1
81 TABLET, CHEWABLE ORAL DAILY
Status: DISCONTINUED | OUTPATIENT
Start: 2022-08-31 | End: 2022-09-01 | Stop reason: HOSPADM

## 2022-08-31 RX ORDER — PROPOFOL 10 MG/ML
INJECTION, EMULSION INTRAVENOUS CONTINUOUS PRN
Status: DISCONTINUED | OUTPATIENT
Start: 2022-08-31 | End: 2022-08-31 | Stop reason: SDUPTHER

## 2022-08-31 RX ORDER — DIPHENHYDRAMINE HYDROCHLORIDE 50 MG/ML
12.5 INJECTION INTRAMUSCULAR; INTRAVENOUS
Status: DISCONTINUED | OUTPATIENT
Start: 2022-08-31 | End: 2022-08-31 | Stop reason: HOSPADM

## 2022-08-31 RX ORDER — DEXAMETHASONE SODIUM PHOSPHATE 10 MG/ML
8 INJECTION, EMULSION INTRAMUSCULAR; INTRAVENOUS ONCE
Status: COMPLETED | OUTPATIENT
Start: 2022-08-31 | End: 2022-08-31

## 2022-08-31 RX ORDER — POLYETHYLENE GLYCOL 3350 17 G/17G
17 POWDER, FOR SOLUTION ORAL DAILY
Status: DISCONTINUED | OUTPATIENT
Start: 2022-08-31 | End: 2022-09-01 | Stop reason: HOSPADM

## 2022-08-31 RX ORDER — SODIUM CHLORIDE 9 MG/ML
INJECTION, SOLUTION INTRAVENOUS CONTINUOUS
Status: DISCONTINUED | OUTPATIENT
Start: 2022-08-31 | End: 2022-08-31 | Stop reason: SDUPTHER

## 2022-08-31 RX ORDER — LIDOCAINE 4 G/G
1 PATCH TOPICAL DAILY
Status: DISCONTINUED | OUTPATIENT
Start: 2022-08-31 | End: 2022-09-01 | Stop reason: HOSPADM

## 2022-08-31 RX ORDER — OXYCODONE HYDROCHLORIDE AND ACETAMINOPHEN 5; 325 MG/1; MG/1
1 TABLET ORAL ONCE
Status: DISCONTINUED | OUTPATIENT
Start: 2022-08-31 | End: 2022-09-01 | Stop reason: HOSPADM

## 2022-08-31 RX ORDER — CHOLECALCIFEROL (VITAMIN D3) 1250 MCG
CAPSULE ORAL DAILY
Status: CANCELLED | OUTPATIENT
Start: 2022-08-31

## 2022-08-31 RX ORDER — DEXTROSE MONOHYDRATE 100 MG/ML
INJECTION, SOLUTION INTRAVENOUS CONTINUOUS PRN
Status: DISCONTINUED | OUTPATIENT
Start: 2022-08-31 | End: 2022-09-01 | Stop reason: HOSPADM

## 2022-08-31 RX ORDER — FENTANYL CITRATE 50 UG/ML
INJECTION, SOLUTION INTRAMUSCULAR; INTRAVENOUS PRN
Status: DISCONTINUED | OUTPATIENT
Start: 2022-08-31 | End: 2022-08-31 | Stop reason: SDUPTHER

## 2022-08-31 RX ORDER — INSULIN GLARGINE 100 [IU]/ML
40 INJECTION, SOLUTION SUBCUTANEOUS NIGHTLY
Status: DISCONTINUED | OUTPATIENT
Start: 2022-08-31 | End: 2022-09-01 | Stop reason: HOSPADM

## 2022-08-31 RX ORDER — GUAIFENESIN 100 MG/5ML
400 SOLUTION ORAL 2 TIMES DAILY PRN
Status: DISCONTINUED | OUTPATIENT
Start: 2022-08-31 | End: 2022-09-01 | Stop reason: HOSPADM

## 2022-08-31 RX ORDER — LISINOPRIL 10 MG/1
10 TABLET ORAL DAILY
Status: DISCONTINUED | OUTPATIENT
Start: 2022-08-31 | End: 2022-09-01 | Stop reason: HOSPADM

## 2022-08-31 RX ORDER — ROCURONIUM BROMIDE 10 MG/ML
INJECTION, SOLUTION INTRAVENOUS PRN
Status: DISCONTINUED | OUTPATIENT
Start: 2022-08-31 | End: 2022-08-31 | Stop reason: SDUPTHER

## 2022-08-31 RX ORDER — ALBUTEROL SULFATE 90 UG/1
2 AEROSOL, METERED RESPIRATORY (INHALATION) EVERY 6 HOURS PRN
Status: DISCONTINUED | OUTPATIENT
Start: 2022-08-31 | End: 2022-09-01 | Stop reason: HOSPADM

## 2022-08-31 RX ORDER — MEPERIDINE HYDROCHLORIDE 25 MG/ML
12.5 INJECTION INTRAMUSCULAR; INTRAVENOUS; SUBCUTANEOUS EVERY 5 MIN PRN
Status: DISCONTINUED | OUTPATIENT
Start: 2022-08-31 | End: 2022-08-31

## 2022-08-31 RX ORDER — IBUPROFEN 400 MG/1
400 TABLET ORAL EVERY 6 HOURS PRN
Status: DISCONTINUED | OUTPATIENT
Start: 2022-08-31 | End: 2022-08-31

## 2022-08-31 RX ORDER — ACETAMINOPHEN 325 MG/1
650 TABLET ORAL EVERY 4 HOURS PRN
Status: DISCONTINUED | OUTPATIENT
Start: 2022-08-31 | End: 2022-09-01 | Stop reason: HOSPADM

## 2022-08-31 RX ORDER — SODIUM CHLORIDE 0.9 % (FLUSH) 0.9 %
5-40 SYRINGE (ML) INJECTION PRN
Status: DISCONTINUED | OUTPATIENT
Start: 2022-08-31 | End: 2022-09-01 | Stop reason: HOSPADM

## 2022-08-31 RX ORDER — FUROSEMIDE 40 MG/1
40 TABLET ORAL DAILY
Status: DISCONTINUED | OUTPATIENT
Start: 2022-08-31 | End: 2022-09-01 | Stop reason: HOSPADM

## 2022-08-31 RX ORDER — LIDOCAINE HYDROCHLORIDE 40 MG/ML
1 SOLUTION TOPICAL PRN
Status: CANCELLED | OUTPATIENT
Start: 2022-08-31

## 2022-08-31 RX ORDER — SODIUM CHLORIDE 9 MG/ML
INJECTION, SOLUTION INTRAVENOUS PRN
Status: DISCONTINUED | OUTPATIENT
Start: 2022-08-31 | End: 2022-08-31 | Stop reason: HOSPADM

## 2022-08-31 RX ORDER — INSULIN LISPRO 100 [IU]/ML
0-8 INJECTION, SOLUTION INTRAVENOUS; SUBCUTANEOUS
Status: DISCONTINUED | OUTPATIENT
Start: 2022-09-01 | End: 2022-09-01 | Stop reason: HOSPADM

## 2022-08-31 RX ORDER — TIZANIDINE 4 MG/1
4 TABLET ORAL 2 TIMES DAILY
Status: DISCONTINUED | OUTPATIENT
Start: 2022-08-31 | End: 2022-09-01 | Stop reason: HOSPADM

## 2022-08-31 RX ORDER — ONDANSETRON 2 MG/ML
4 INJECTION INTRAMUSCULAR; INTRAVENOUS EVERY 6 HOURS PRN
Status: DISCONTINUED | OUTPATIENT
Start: 2022-08-31 | End: 2022-09-01 | Stop reason: HOSPADM

## 2022-08-31 RX ORDER — ONDANSETRON 2 MG/ML
4 INJECTION INTRAMUSCULAR; INTRAVENOUS
Status: DISCONTINUED | OUTPATIENT
Start: 2022-08-31 | End: 2022-08-31 | Stop reason: HOSPADM

## 2022-08-31 RX ORDER — SODIUM CHLORIDE 0.9 % (FLUSH) 0.9 %
5-40 SYRINGE (ML) INJECTION EVERY 12 HOURS SCHEDULED
Status: DISCONTINUED | OUTPATIENT
Start: 2022-08-31 | End: 2022-09-01 | Stop reason: HOSPADM

## 2022-08-31 RX ORDER — TIZANIDINE 4 MG/1
4 TABLET ORAL 2 TIMES DAILY
COMMUNITY

## 2022-08-31 RX ORDER — SODIUM CHLORIDE 9 MG/ML
INJECTION, SOLUTION INTRAVENOUS PRN
Status: DISCONTINUED | OUTPATIENT
Start: 2022-08-31 | End: 2022-09-01 | Stop reason: HOSPADM

## 2022-08-31 RX ORDER — EPINEPHRINE 1 MG/ML
INJECTION, SOLUTION, CONCENTRATE INTRAVENOUS PRN
Status: DISCONTINUED | OUTPATIENT
Start: 2022-08-31 | End: 2022-08-31 | Stop reason: ALTCHOICE

## 2022-08-31 RX ORDER — METOPROLOL SUCCINATE 25 MG/1
25 TABLET, EXTENDED RELEASE ORAL DAILY
Status: DISCONTINUED | OUTPATIENT
Start: 2022-08-31 | End: 2022-09-01 | Stop reason: HOSPADM

## 2022-08-31 RX ORDER — SODIUM CHLORIDE 450 MG/100ML
INJECTION, SOLUTION INTRAVENOUS CONTINUOUS
Status: DISCONTINUED | OUTPATIENT
Start: 2022-08-31 | End: 2022-09-01 | Stop reason: HOSPADM

## 2022-08-31 RX ORDER — DEXAMETHASONE SODIUM PHOSPHATE 10 MG/ML
INJECTION, EMULSION INTRAMUSCULAR; INTRAVENOUS PRN
Status: DISCONTINUED | OUTPATIENT
Start: 2022-08-31 | End: 2022-08-31 | Stop reason: SDUPTHER

## 2022-08-31 RX ORDER — NITROGLYCERIN 0.4 MG/1
0.4 TABLET SUBLINGUAL EVERY 5 MIN PRN
Status: CANCELLED | OUTPATIENT
Start: 2022-08-31

## 2022-08-31 RX ORDER — OXYCODONE HYDROCHLORIDE 5 MG/1
10 TABLET ORAL EVERY 4 HOURS PRN
Status: DISCONTINUED | OUTPATIENT
Start: 2022-08-31 | End: 2022-09-01 | Stop reason: HOSPADM

## 2022-08-31 RX ORDER — ROSUVASTATIN CALCIUM 10 MG/1
10 TABLET, COATED ORAL NIGHTLY
Status: DISCONTINUED | OUTPATIENT
Start: 2022-08-31 | End: 2022-09-01 | Stop reason: HOSPADM

## 2022-08-31 RX ADMIN — DEXAMETHASONE SODIUM PHOSPHATE 2 MG: 10 INJECTION, EMULSION INTRAMUSCULAR; INTRAVENOUS at 13:06

## 2022-08-31 RX ADMIN — PROPOFOL 150 MCG/KG/MIN: 10 INJECTION, EMULSION INTRAVENOUS at 13:01

## 2022-08-31 RX ADMIN — ROCURONIUM BROMIDE 20 MG: 10 INJECTION, SOLUTION INTRAVENOUS at 14:26

## 2022-08-31 RX ADMIN — ROCURONIUM BROMIDE 50 MG: 10 INJECTION, SOLUTION INTRAVENOUS at 13:01

## 2022-08-31 RX ADMIN — AMPICILLIN SODIUM AND SULBACTAM SODIUM 3000 MG: 2; 1 INJECTION, POWDER, FOR SOLUTION INTRAMUSCULAR; INTRAVENOUS at 15:17

## 2022-08-31 RX ADMIN — SODIUM CHLORIDE: 4.5 INJECTION, SOLUTION INTRAVENOUS at 21:07

## 2022-08-31 RX ADMIN — ONDANSETRON 4 MG: 2 INJECTION INTRAMUSCULAR; INTRAVENOUS at 13:06

## 2022-08-31 RX ADMIN — HYDROMORPHONE HYDROCHLORIDE 0.25 MG: 1 INJECTION, SOLUTION INTRAMUSCULAR; INTRAVENOUS; SUBCUTANEOUS at 16:20

## 2022-08-31 RX ADMIN — FENTANYL CITRATE 50 MCG: 50 INJECTION, SOLUTION INTRAMUSCULAR; INTRAVENOUS at 13:58

## 2022-08-31 RX ADMIN — ROCURONIUM BROMIDE 20 MG: 10 INJECTION, SOLUTION INTRAVENOUS at 15:28

## 2022-08-31 RX ADMIN — TIZANIDINE 4 MG: 4 TABLET ORAL at 21:37

## 2022-08-31 RX ADMIN — PREDNISONE 15 MG: 10 TABLET ORAL at 10:48

## 2022-08-31 RX ADMIN — ROCURONIUM BROMIDE 10 MG: 10 INJECTION, SOLUTION INTRAVENOUS at 14:14

## 2022-08-31 RX ADMIN — HYDROMORPHONE HYDROCHLORIDE 0.25 MG: 1 INJECTION, SOLUTION INTRAMUSCULAR; INTRAVENOUS; SUBCUTANEOUS at 16:15

## 2022-08-31 RX ADMIN — HYDRALAZINE HYDROCHLORIDE 10 MG: 20 INJECTION INTRAMUSCULAR; INTRAVENOUS at 14:42

## 2022-08-31 RX ADMIN — SODIUM CHLORIDE 3000 MG: 900 INJECTION INTRAVENOUS at 21:13

## 2022-08-31 RX ADMIN — FENTANYL CITRATE 100 MCG: 50 INJECTION, SOLUTION INTRAMUSCULAR; INTRAVENOUS at 13:25

## 2022-08-31 RX ADMIN — ROCURONIUM BROMIDE 10 MG: 10 INJECTION, SOLUTION INTRAVENOUS at 13:56

## 2022-08-31 RX ADMIN — HYDROMORPHONE HYDROCHLORIDE 0.5 MG: 1 INJECTION, SOLUTION INTRAMUSCULAR; INTRAVENOUS; SUBCUTANEOUS at 20:45

## 2022-08-31 RX ADMIN — ROSUVASTATIN 10 MG: 10 TABLET, FILM COATED ORAL at 21:35

## 2022-08-31 RX ADMIN — AMPICILLIN SODIUM AND SULBACTAM SODIUM 3000 MG: 2; 1 INJECTION, POWDER, FOR SOLUTION INTRAMUSCULAR; INTRAVENOUS at 13:06

## 2022-08-31 RX ADMIN — SODIUM CHLORIDE, PRESERVATIVE FREE 10 ML: 5 INJECTION INTRAVENOUS at 21:04

## 2022-08-31 RX ADMIN — INSULIN GLARGINE 40 UNITS: 100 INJECTION, SOLUTION SUBCUTANEOUS at 21:44

## 2022-08-31 RX ADMIN — ROCURONIUM BROMIDE 20 MG: 10 INJECTION, SOLUTION INTRAVENOUS at 14:20

## 2022-08-31 RX ADMIN — PROPOFOL 150 MG: 10 INJECTION, EMULSION INTRAVENOUS at 13:01

## 2022-08-31 RX ADMIN — FENTANYL CITRATE 100 MCG: 50 INJECTION, SOLUTION INTRAMUSCULAR; INTRAVENOUS at 15:47

## 2022-08-31 RX ADMIN — PREDNISONE 15 MG: 10 TABLET ORAL at 21:35

## 2022-08-31 RX ADMIN — SUGAMMADEX 200 MG: 100 INJECTION, SOLUTION INTRAVENOUS at 15:36

## 2022-08-31 RX ADMIN — FENTANYL CITRATE 50 MCG: 50 INJECTION, SOLUTION INTRAMUSCULAR; INTRAVENOUS at 13:52

## 2022-08-31 RX ADMIN — PROPOFOL 50 MG: 10 INJECTION, EMULSION INTRAVENOUS at 13:03

## 2022-08-31 RX ADMIN — DEXAMETHASONE SODIUM PHOSPHATE 8 MG: 10 INJECTION, EMULSION INTRAMUSCULAR; INTRAVENOUS at 12:22

## 2022-08-31 RX ADMIN — ROCURONIUM BROMIDE 10 MG: 10 INJECTION, SOLUTION INTRAVENOUS at 15:08

## 2022-08-31 RX ADMIN — METOPROLOL TARTRATE 5 MG: 5 INJECTION INTRAVENOUS at 14:03

## 2022-08-31 RX ADMIN — SODIUM CHLORIDE: 9 INJECTION, SOLUTION INTRAVENOUS at 12:21

## 2022-08-31 RX ADMIN — ROCURONIUM BROMIDE 10 MG: 10 INJECTION, SOLUTION INTRAVENOUS at 13:54

## 2022-08-31 RX ADMIN — ROCURONIUM BROMIDE 20 MG: 10 INJECTION, SOLUTION INTRAVENOUS at 13:21

## 2022-08-31 ASSESSMENT — PAIN DESCRIPTION - DESCRIPTORS
DESCRIPTORS: ACHING;DULL;NAGGING
DESCRIPTORS: ACHING;DULL;NAGGING
DESCRIPTORS: ACHING
DESCRIPTORS: ACHING

## 2022-08-31 ASSESSMENT — PAIN DESCRIPTION - ORIENTATION: ORIENTATION: LOWER

## 2022-08-31 ASSESSMENT — PAIN DESCRIPTION - LOCATION
LOCATION: HEAD
LOCATION: HEAD
LOCATION: BACK

## 2022-08-31 ASSESSMENT — PAIN SCALES - GENERAL
PAINLEVEL_OUTOF10: 5
PAINLEVEL_OUTOF10: 5
PAINLEVEL_OUTOF10: 10

## 2022-08-31 ASSESSMENT — PAIN - FUNCTIONAL ASSESSMENT
PAIN_FUNCTIONAL_ASSESSMENT: 0-10
PAIN_FUNCTIONAL_ASSESSMENT: PREVENTS OR INTERFERES SOME ACTIVE ACTIVITIES AND ADLS

## 2022-08-31 NOTE — PROGRESS NOTES
1557 Awake and communicating on arrival to PACU with Trach mask at 3L , pt able to communicate by mouthing words  1605 pt given popscicle to help with tongue swelling  1615 pt c/o H/A # 5 , medicated with Dilaudid 0.25 mg IV  1620 H/A remains medicated with Dilaudid 0.25 mg IV  1630 resting resp easy   1650 pt awakens easily to name , states H/A improved   1655 meets criteria for discharge , transported floor , message sent to family to go to floor

## 2022-08-31 NOTE — H&P
The patient's most recent clinic note is only a week old and so still constitutes an accurate representation of the patient's condition and planned procedure. I reviewed all of this with the patient and her  at the bedside. She has been doing well with her blood glucose levels on the 30 mg/day of prednisone. She will need a stress dose today for anesthesia so I will give her an 8 mg dose of Decadron. Gagan Butcher. Jasbir Valdez MD    Office Visit  8/22/2022  Jc Ferrera MD  Otolaryngology Posterior glottic stenosis +4 more  Dx Post-Op Check   Reason for Visit     Progress Notes  Annabella Habermann, MD (Physician)   Otolaryngology  [facility] [place] [address] [place] [address], NOSE AND THROAT  Marilu Rishabh Suki [address] [place] [address]  Dept: [phone]  Dept Fax: [phone]  Loc: [phone]     Juve Bradford is a 73 y.o. female who was referred by No ref. provider found for:       Chief Complaint   Patient presents with    Post-Op Check       Patient here for post op exam.          HPI:      Juve Bradford is a 73 y.o. female status post subglottic and peristomal dilation and debridement for near complete obliteration of her subglottic airway associated with a long-term tracheostomy. The patient is here today with her  reporting marked improvement in her ability to tolerate her Passy[place] valve. She apparently is also able to talk without even holding her finger on the trach cannula which indicates a dramatic improvement to her overall breathing comfort. Of note is the fact that she hurt her lower back on the chair over the past several days so is having difficulty with ambulation. History:             Allergies   Allergen Reactions    Metformin And Related Other (See Comments)       diarrhea    Codeine Nausea And Vomiting    Meperidine Hcl Nausea And Vomiting    Sulfa Antibiotics Nausea And Vomiting    Sumatriptan Nausea And Vomiting      Current Facility-Administered Medications          Current Outpatient Medications   Medication Sig Dispense Refill    predniSONE (DELTASONE) 10 MG tablet Take 1.5 tablets by mouth in the morning and 1.5 tablets before bedtime. 270 tablet 0    LANTUS SOLOSTAR 100 UNIT/ML injection pen Inject 50 Units into the skin nightly 15 mL 0    insulin aspart (NOVOLOG FLEXPEN) 100 UNIT/ML injection pen Inject 20-25 Units into the skin in the morning and 20-25 Units at noon and 20-25 Units in the evening. Inject before meals. 22.5 mL 0    glucagon 1 MG injection Inject 1 mg into the muscle See Admin Instructions Follow package directions for low blood sugar. 1 kit 3    Insulin Pen Needle 31G X 6 MM MISC 1 each by Does not apply route daily 100 each 3    albuterol (ACCUNEB) 0.63 MG/3ML nebulizer solution Take 1 ampule by nebulization every 6 hours as needed for Wheezing        lidocaine (XYLOCAINE) 4 % external solution Apply 1 Dose topically as needed for Pain Apply topically as needed.         Cholecalciferol (VITAMIN D3 PO) Take by mouth daily 4000 units        NONFORMULARY daily resveratrol        lisinopril (PRINIVIL;ZESTRIL) 10 MG tablet Take 1 tablet by mouth daily 30 tablet 3    guaiFENesin 400 MG tablet Take 400 mg by mouth 2 times daily as needed for Cough        metoprolol succinate (TOPROL XL) 25 MG extended release tablet Take 1 tablet by mouth daily 30 tablet 3    albuterol sulfate  (90 Base) MCG/ACT inhaler Inhale 2 puffs into the lungs every 6 hours as needed for Wheezing 18 g 3    furosemide (LASIX) 40 MG tablet Take 1 tablet by mouth daily 60 tablet 3    OXYGEN Inhale 2 L/min into the lungs continuous prn (during activities such as walking) 1 Canister 5    rosuvastatin (CRESTOR) 10 MG tablet Take 10 mg by mouth daily        nitroGLYCERIN (NITROSTAT) 0.4 MG SL tablet Place 0.4 mg under the tongue every 5 minutes as needed for Chest pain up to max of 3 total doses. If no relief after 1 dose, call 911.         aspirin 81 MG chewable tablet Take 81 mg by mouth daily          No current facility-administered medications for this visit.                 Facility-Administered Medications Ordered in Other Visits   Medication Dose Route Frequency Provider Last Rate Last Admin    sodium chloride flush 0.9 % injection 10 mL  10 mL IntraVENous PRN Destiney Washburn MD   10 mL at 22 1326         Past Medical History        Past Medical History:   Diagnosis Date    Arthritis      Coronary artery disease      COVID      Hyperlipidemia      Primary hypertension      Type 2 diabetes mellitus ([facility] [address] 2018         Past Surgical History         Past Surgical History:   Procedure Laterality Date    CARDIAC SURGERY        CATARACT REMOVAL Bilateral      SECTION         3 times    CORONARY ANGIOPLASTY WITH STENT PLACEMENT   2008     stenting twice    EYE SURGERY        HIATAL HERNIA REPAIR         late     HYSTERECTOMY, TOTAL ABDOMINAL (CERVIX REMOVED)         in     LARYNGOSCOPY N/A 3/22/2022     SUSPENSON LARYNGOSCOPY WITH JET VENT, DILATION performed by Mich Reveles MD at [address] N/A 3/28/2022     SUSPENSION MICROLARYNGOSCOPY WITH BALLOON DILATION AND JET VENT, KENALOG INJECTION performed by Mich Reveles MD at [address] N/A 2022     Suspension Laryngoscopy  Lateral of Right True Vocal Cord, With Steroid Injection of Larynx And Tracheostomy Tube Change, debridement performed by Destiney Washburn MD at [address] N/A 8/10/2022     TRANSORAL LARYNGOPLASTY WITH LEFT PARTIAL ARYTENODECTOMY AND POSS LATERALIZATION, ADVANCEMENT FLAP RECONSTRUCTION WITH JET VENT,THERAPUTIC BRONCHOSCOPY WITH DEBRIEDMENT,WITH BALLOON DILITATION performed by Destiney Washburn MD at [address]         in [address] [place] [address] N/A 2022     TRACHEOTOMY TUBE REPLACEMENT performed by Mich Reveles MD at [facility]         Family History         Family History   Problem Relation Age of Onset    Parkinson's Disease Father      Lung Cancer Father           Social History            Tobacco Use    Smoking status: Never    Smokeless tobacco: Never   Substance Use Topics    Alcohol use: Not Currently       Comment: drinks 1 glass of wine cooler or wine about 3 times per year                    Subjective:      Review of Systems  Rest of review of systems are negative, except as noted in HPI. Objective:      /84 (Site: Right Lower Arm, Position: Sitting)   Pulse 65   Temp 97.5 °F (36.4 °C) (Infrared)   Resp 20   Wt 204 lb (92.5 kg)   LMP  (LMP Unknown)   SpO2 99%   BMI 37.31 kg/m²      Physical Exam         On general physical exam the patient is pleasant alert and cooperative adult female in no acute distress. She is complaining of lower back pain. She is comfortably speaking with her Passy-[place] valve, something she could not do while in the hospital.  Her voice is low in pitch but easy to understand. She is in no airway distress. There are no signs of recent bleeding or infection of the tracheostomy; specifically there is no foul odor or blood staining of her dressing. Procedure: Flexible laryngoscopy  Indication: The patient has a history of upper airway obstruction and warrants an endoscopy to see whether or not adequate to normal movement of the true vocal folds has returned. This is a critical minimum and her neck stages of care. Findings:  The patient's larynx was well visualized. The left true vocal fold was across the midline opposing the right side and preventing more than a very small entry glottic aperture to be evident. With exhalation through the glottis, this would push out the left side to a small degree providing a gap between the 2 structures and allowing the patient to tolerate having the Passy-[place] valve.   Right-sided mobility could not be assessed but given the high likelihood [facility])  Miscellaneous Surgery       3. Hoarseness  Miscellaneous Surgery       4. Dyspnea and respiratory abnormalities  Miscellaneous Surgery       5. 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 She will need a definitive left true vocal fold and arytenoid lateralization in order to make any progress towards decannulation. In addition she needs her neck stage of subglottic and peristomal laryngeal and tracheal dilation and debridement. She has made substantial progress in this regard now tolerating her Passy-[place] valve for hours at a time. That is an excellent finding. She also has low back pain of unknown etiology is getting worked up for that and hopefully will be less symptomatic bedtime her surgery is planned. I explained all this in detail to the patient and her  who was with us the entire time to their satisfaction     I reviewed the indications benefits limitations and risks of proceeding with a left-sided partial arytenoidectomy and lateralization with an externally placed, likely Prolene based suture placement so as to be able to reverse it if the patient is unable to protect her airway as a result of it. In addition I plan to extend the work in the subglottis and peristomal tracheal areas to further consolidate the improvement in her overall airway patency. She is to stay on her 30 mg/day dosage of prednisone at a regimen of 15 mg twice daily to help reduce the challenges of controlling her blood glucoses despite this glucocorticoid effect. The patient and her  reported being pleased with the outcome of the visit and the planned interventions and being willing to proceed as such.      I spent over 45 minutes of face-to-face time with the patient well more than half of which was dedicated to reviewing her complex history and planning this comprehensive treatment program.     Return in about 6 weeks (around 10/3/2022) for Follow-up evaluation and possible endoscopy. **This report has been created using voice recognition software. It may contain minor errors which are inherent in voice recognition technology. **                                   Instructions      Return in about 6 weeks (around 10/3/2022) for Follow-up evaluation and possible endoscopy. AVS (Automatic SnapShot taken 8/22/2022)  Additional Documentation    Vitals:  /84 (Site: Right Lower Arm, Position: Sitting)  Pulse 65  Temp 97.5 °F (36.4 °C) (Infrared)  Resp 20  Wt 204 lb (92.5 kg)  LMP  (LMP Unknown)  SpO2 99%  BMI 37.31 kg/m²  BSA 2.01 m²    More Vitals   Flowsheets:  Calorie Assessment,  Vitals Reassessment     Encounter Info:  Billing Info,  Allergies,  Detailed Report,  History,  Medications,  Questionnaires  . Eduardo Kaiser .(4 more)      Travel Screening   Screenings since 08/21/22 0000  08/31/22 1034          In the last 10 days, have you been in contact with someone who was confirmed or suspected to have Coronavirus/COVID-19? No / Adrien Sarmiento RN   Have you had a COVID-19 viral test in the last 10 days? No Ursula Ferrara RN   Do you have any of the following new or worsening symptoms? None of these Ursula Ferrara RN   Have you traveled internationally or domestically in the last month?  No Ursula Ferrara RN     Travel History   Travel since 08/01/22   No documented travel since 08/01/22  Communications    View Encounter Conversation Summary    Visit Summary with Notes sent to [address] [place] [address]  From this encounter  Document on 8/22/2022 11:23 AM by Mariah Diaz MA: ENT 08/22/22 trans nasal endoscopy and trans stomal bronchoscopy video   Document on 8/22/2022 11:15 AM by Mariah Diaz MA: ENT 08/22/22 Transnasal endoscopy and trans stomal bronchoscopy Video   Document on 8/22/2022 11:10 AM by Ivette Vera MA: ENT 08/22/22 Trans nasal endoscopy and trans stomal bronchoscopy video   Document on 8/22/2022 11:09 AM by Ievtte Vera MA: ENT 08/22/22 Trans nasal endoscopy and trans stomal bronchoscopy photos   Document on 8/22/2022 11:09 AM by Ivette Vera MA: ENT 08/22/22 Trans nasal endoscopy and trans stomal bronchoscopy photos   Document on 8/22/2022 11:09 AM by Ivette Vera MA: ENT 08/22/22 Trans nasal endoscopy and trans stomal bronchoscopy photos   Scan on 8/22/2022 11:09 AM by Ivette Vera MA: ENT 08/22/22 Trans nasal endoscopy and trans stomal bronchoscopy photosScan on 8/22/2022 11:09 AM by Ivette Vera MA: ENT 08/22/22 Trans nasal endoscopy and trans stomal bronchoscopy photos   Scan on 8/22/2022 11:09 AM by Ivette Vera MA: ENT 08/22/22 Trans nasal endoscopy and trans stomal bronchoscopy photosScan on 8/22/2022 11:09 AM by Ivette Vera MA: ENT 08/22/22 Trans nasal endoscopy and trans stomal bronchoscopy photos   Scan on 8/22/2022 11:09 AM by Ivette Vera MA: ENT 08/22/22 Trans nasal endoscopy and trans stomal bronchoscopy photosScan on 8/22/2022 11:09 AM by Ivette Vera MA: ENT 08/22/22 Trans nasal endoscopy and trans stomal bronchoscopy photos   Scan on 8/22/2022 11:09 AM by Ivette Vera MA: ENT 08/22/22 Trans nasal endoscopy and trans stomal bronchoscopy photosScan on 8/22/2022 11:09 AM by Ivette Vera, MA: ENT 08/22/22 Trans nasal endoscopy and trans stomal bronchoscopy photos   Scan on 8/22/2022 11:09 AM by Ivette Vera MA: ENT 08/22/22 Trans nasal endoscopy and trans stomal bronchoscopy photosScan on 8/22/2022 11:09 AM by Ivette Vera MA: ENT 08/22/22 Trans nasal endoscopy and trans stomal bronchoscopy photos   Scan on 8/22/2022 11:09 AM by Ivette Vera MA: ENT 08/22/22 Trans nasal endoscopy and trans stomal bronchoscopy photosScan on 8/22/2022 11:09 AM by Marty Burgos Wendy Dinero, MA: ENT 08/22/22 Trans nasal endoscopy and trans stomal bronchoscopy photos   Scan on 8/22/2022 11:09 AM by Charisma Flash, MA: ENT 08/22/22 Trans nasal endoscopy and trans stomal bronchoscopy photosScan on 8/22/2022 11:09 AM by Charisma Flash, MA: ENT 08/22/22 Trans nasal endoscopy and trans stomal bronchoscopy photos   Scan on 8/22/2022 11:09 AM by Charisma Flash, MA: ENT 08/22/22 Trans nasal endoscopy and trans stomal bronchoscopy photosScan on 8/22/2022 11:09 AM by Charisma Flash, MA: ENT 08/22/22 Trans nasal endoscopy and trans stomal bronchoscopy photos   Scan on 8/22/2022 11:09 AM by Charisma Flash, MA: ENT 08/22/22 Trans nasal endoscopy and trans stomal bronchoscopy photosScan on 8/22/2022 11:09 AM by Charisma Flash, MA: ENT 08/22/22 Trans nasal endoscopy and trans stomal bronchoscopy photos   Scan on 8/22/2022 11:09 AM by Charisma Flash, MA: ENT 08/22/22 Trans nasal endoscopy and trans stomal bronchoscopy photosScan on 8/22/2022 11:09 AM by Charisma Flash, MA: ENT 08/22/22 Trans nasal endoscopy and trans stomal bronchoscopy photos   Scan on 8/22/2022 11:09 AM by Charisma Flash, MA: ENT 08/22/22 Trans nasal endoscopy and trans stomal bronchoscopy photosScan on 8/22/2022 11:09 AM by Charisma Flash, MA: ENT 08/22/22 Trans nasal endoscopy and trans stomal bronchoscopy photos   Scan on 8/22/2022 11:09 AM by Charisma Flash, MA: ENT 08/22/22 Trans nasal endoscopy and trans stomal bronchoscopy photosScan on 8/22/2022 11:09 AM by Charisma Flash, MA: ENT 08/22/22 Trans nasal endoscopy and trans stomal bronchoscopy photos   Scan on 8/22/2022 11:09 AM by Charisma Flash, MA: ENT 08/22/22 Trans nasal endoscopy and trans stomal bronchoscopy photosScan on 8/22/2022 11:09 AM by Charisma Flash, MA: ENT 08/22/22 Trans nasal endoscopy and trans stomal bronchoscopy photos     Chart Review Routing History    No routing history on file.   Encounter Status    Signed by Thomas Sainz MD on 8/22/22 at 22:06  BestPractice Advisories    Click to view BestPractice Advisory history     Encounter Messages    No messages in this encounter     Orders Placed    Miscellaneous Surgery  Outpatient Medications at End of Encounter as of 8/22/2022    predniSONE (DELTASONE) 10 MG tablet (Taking) Take 1.5 tablets by mouth in the morning and 1.5 tablets before bedtime. LANTUS SOLOSTAR 100 UNIT/ML injection pen (Taking) Inject 50 Units into the skin nightly   insulin aspart (NOVOLOG FLEXPEN) 100 UNIT/ML injection pen (Taking) Inject 20-25 Units into the skin in the morning and 20-25 Units at noon and 20-25 Units in the evening. Inject before meals. glucagon 1 MG injection (Taking) Inject 1 mg into the muscle See Admin Instructions Follow package directions for low blood sugar. Insulin Pen Needle 31G X 6 MM MISC (Taking) 1 each by Does not apply route daily   albuterol (ACCUNEB) 0.63 MG/3ML nebulizer solution (Taking) Take 1 ampule by nebulization every 6 hours as needed for Wheezing   lidocaine (XYLOCAINE) 4 % external solution (Taking) Apply 1 Dose topically as needed for Pain Apply topically as needed.    Cholecalciferol (VITAMIN D3 PO) (Taking) Take by mouth daily 4000 units   NONFORMULARY (Taking) daily resveratrol   lisinopril (PRINIVIL;ZESTRIL) 10 MG tablet (Taking) Take 1 tablet by mouth daily   guaiFENesin 400 MG tablet (Taking) Take 400 mg by mouth 2 times daily as needed for Cough   metoprolol succinate (TOPROL XL) 25 MG extended release tablet (Taking) Take 1 tablet by mouth daily   albuterol sulfate  (90 Base) MCG/ACT inhaler (Taking) Inhale 2 puffs into the lungs every 6 hours as needed for Wheezing   furosemide (LASIX) 40 MG tablet (Taking) Take 1 tablet by mouth daily   OXYGEN (Taking) Inhale 2 L/min into the lungs continuous prn (during activities such as walking)   rosuvastatin (CRESTOR) 10 MG tablet (Taking) Take 10 mg by mouth daily   nitroGLYCERIN (NITROSTAT) 0.4 MG SL tablet (Taking) Place 0.4 mg under the tongue every 5 minutes as needed for Chest pain up to max of 3 total doses. If no relief after 1 dose, call 911.    aspirin 81 MG chewable tablet (Taking) Take 81 mg by mouth daily   insulin lispro, 1 Unit Dial, (HUMALOG KWIKPEN) 100 UNIT/ML SOPN (Discontinued) Inject 20-25 Units into the skin in the morning and 20-25 Units at noon and 20-25 Units in the evening. Inject before meals.    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

## 2022-08-31 NOTE — FLOWSHEET NOTE
08/31/22 1807   Safe Environment   Safety Measures   (virtual nurse rounds completed)   Staff at bedside

## 2022-08-31 NOTE — FLOWSHEET NOTE
08/31/22 1954   Treatment Team Notification   Reason for Communication Evaluate; Review case   Team Member Name Juliana Britt   Treatment Team Role Consulting Provider  (NP hospital consult)   Method of Communication Secure Message   Notification Time 1954     Insulin management   Call me with questions and concerns    Stella Welch.  Amy Smalls, [address] [place] [address]  Cell: [phone]

## 2022-08-31 NOTE — ANESTHESIA POSTPROCEDURE EVALUATION
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 Patricia Phase I: Patricia Score: 9    Patricia Phase II:        Anesthesia Post Evaluation    Patient location during evaluation: PACU  Patient participation: complete - patient participated  Level of consciousness: awake and alert  Airway patency: patent  Nausea & Vomiting: no nausea and no vomiting  Complications: no  Cardiovascular status: hemodynamically stable  Respiratory status: acceptable  Hydration status: euvolemic      [address]  POST-ANESTHESIA NOTE       Name:  Viri Rueda                                         Age:  73 y.o.   MRN:  [id number]      Last Vitals:  BP (!) 140/70   Pulse 90   Temp 98.2 °F (36.8 °C) (Oral)   Resp 18   Ht 5' 2\" (1.575 m)   Wt 196 lb 3.2 oz (89 kg)   LMP  (LMP Unknown)   SpO2 98%   BMI 35.89 kg/m²   Patient Vitals for the past 4 hrs:   BP Temp Temp src Pulse Resp SpO2   08/31/22 1813 -- -- -- -- -- 98 %   08/31/22 1745 (!) 140/70 -- -- 90 18 --   08/31/22 1713 131/65 98.2 °F (36.8 °C) Oral 91 20 99 %   08/31/22 1650 133/60 -- -- 90 19 100 %   08/31/22 1645 (!) 128/58 -- -- 89 14 100 %   08/31/22 1640 (!) 130/57 -- -- 88 12 100 %   08/31/22 0020 (!) 128/58 -- -- 89 10 100 %   08/31/22 1630 129/63 -- -- 91 13 99 %   08/31/22 1625 (!) 147/65 -- -- 98 30 100 %   08/31/22 1620 137/62 -- -- 97 22 100 %   08/31/22 1615 130/60 -- -- 96 27 100 %   08/31/22 1557 125/61 98.1 °F (36.7 °C) Temporal 99 20 99 %       Level of Consciousness:  Awake    Respiratory:  Stable    Oxygen Saturation:  Stable    Cardiovascular:  Stable    Hydration:  Adequate    PONV:  Stable    Post-op Pain:  Adequate analgesia    Post-op Assessment:  No apparent anesthetic complications    Additional Follow-Up / Treatment / Comment:  None    Richard Fitzpatrick MD  August 31, 2022   7:53 PM

## 2022-08-31 NOTE — OP NOTE
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]    Post-Op Diagnosis: Same; torus mandibularis       Procedure(s):  Laryngoscopy with Dilation Debridement  Bronchoscopy with Dilation & Resection of Obstructing Tissues Transoral Laryngoplasty Left True Vocal Fold Lateralization left partial aryteniodectomy    Surgeon(s):  Lucrecia Libman, MD    Assistant:   * No surgical staff found *    Anesthesia: General    Estimated Blood Loss (mL): less than 50     Complications: None    Specimens:   ID Type Source Tests Collected by Time Destination   A : LEFT ARYTENOID Tissue Vocal Cord SURGICAL PATHOLOGY Lucrecia Libman, MD 8/31/2022 1349        Implants:  * No implants in log *      Drains: * No LDAs found *    Findings: 1. Crowded posterior commissure  2. Lateral arytenoidectomy performed followed by laryngoplasty with lateralization of left vocal fold  3. Subglottis and proximal trachea dilated to 18 mm followed by debridement of suprastomal subglottis and proximal trachea    Intraoperative images:        Detailed Description of Procedure: The patient was taken the operating room awake and placed in supine position. General anesthesia was induced and the patient was intubated through her stoma with a #6 endotracheal tube without difficulty or vital sign instability. The table was turned 90 degrees. The patient was draped in usual fashion for transoral and transabdominal surgery. I performed a timeout verifying the patient's identity and planned procedure. Suspension laryngoscopy with left lateral arytenoidectomy: Place a custom made heat activated dental guard on her maxillary dentition, I passed the Constantin laryngoscope into the laryngeal inlet and extended it anteriorly.   It was difficult to extend anteriorly because of grover mandibular area that were found on examining the inside of the mandibular arch. Nonetheless I was able to get a line of sight view of the arytenoid tower on the left side and with pressure I was able to see the medial aspect of the arytenoid. These are critical exposure criteria for being able to proceed. Bringing on the field an operating microscope with UltraPulse CO2 laser system and a digital Accu blade robotic joystick controller, I began the process of resecting the arytenoid cartilage by making an AE fold incision above the arytenoid tower and then more superiorly and laterally in order to get wide exposure of the deep larynx. I took this dissection along the medial aspect of the arytenoid just lateral to the medial face of the arytenoid and then laterally and anteriorly in front of the arytenoid body. I gradually took this dissection more distally until I could isolate the top of the arytenoid broadly. I used a 1.2 mm Cherokee with 0.1 seconds delay to make the mucosal incisions and then 0 delay to do the deep dissection. Suction cautery was necessary for superior laryngeal arterial branch bleeders that were encountered. I then used a 2 mm straight line to incise the arytenoid lateral to the vocal process and medial body of the arytenoid all the way down to the cricoarytenoid joint space. I then used cup forceps to remove the isolated lateral arytenoid handing off the field to be placed in formalin for permanent section. Suction cautery was necessary here for hemostasis as well. Laryngoplasty with left vocal fold/arytenoid lateralization: I then turned my attention to the reconstruction portion of this part of the operation.   Using a 4-0 Prolene on a PS2 cutting needle, I passed the needle through the soft tissues overlying the thyroid lamina laterally and fished the needle out in the bottom of the wound and then passed it through the undersurface of the arytenoid into the lumen of the airway drying the needle up between the 2 vocal folds and then back through the mucosa covering the arytenoid anteriorly into the wound. I then repeated the suture more posteriorly using the same pattern in parallel to the first stitch. Drawing the needle back out from the wound I passed it through the arytenoid cartilage medially and then fished out again within the lumen of the airway and back through the arytenoid mucosa into the wound. I drew the suture out through the transoral system and placed a hemostat on it allowing me to assess the effect of the tensing of the suture before clipping it. I was able to displace the arytenoid 4 to 5 mm laterally with this approach so I double clipped it with 2 titanium clips using a left shallow angled clip applier. I laser lysed the excess suture material and irrigated out the wound. I used suction cautery for additional hemostasis. I then closed the wound with 3 figure-of-eight 4-0 Monocryl dyed sutures on a P3 cutting needle. I placed all 3 sutures then I clipped them and laser lysed the excess suture material.  The wound was well closed. Laryngoscopy with balloon dilation; subsequent: Using a 15 to 18 mm pneumatic dilator, I placed it into the airway and extended to full 18 mm to stretch the airway again to the size that I had made it during the last procedure. Topical epinephrine was used for hemostasis. Therapeutic bronchoscopy with balloon dilation and resection of obstructing soft tissues: I then turned my attention more distal airway. I balloon dilated the peristomal trachea as well to 18 mm and then placed a 4 mm x 22 cm Tricut debrider blade into the airway and using telescopic guidance I resected the suprastomal soft tissues obstructing the airway to broadly enlarge the airway and remove obstructing soft tissues. I suctioned out the bloody effluent and placed epinephrine pledgets for hemostasis. As such I consider the operation concluded.   I remove the transoral hardware and change the patient to a cuffed tracheostomy cannula at the end of the case in order to prevent blood from dissecting distally in the process. Patient was reversed from general anesthesia and taken to recovery in satisfactory condition. Estimated blood loss in case was less than 30 cc and the patient received approximately a liter and half of intravenous lactated Ringer's intraoperatively. No blood products were transfused. No intraoperative complications were detected. Counts were considered accurate x3. I performed the patient's entire operation myself. Disposition: The patient will be admitted to a monitored bed for close airway observation and intravenous pain management. She will require airway irrigations to remove the blood that dissected into the distal airway as well. Her diet will be gradually advanced. She will continue on her systemic steroids with the extended taper that will be used just as it was after her last operation.     Electronically signed by Rosas Garcia MD on 8/31/2022 at 4:03 PM

## 2022-08-31 NOTE — PROGRESS NOTES
Pt admitted to [facility] room 5 and oriented to unit. SCD sleeves applied. Nares swabbed. Pt verbalized permission for first name, last initial and physicians name on white board. SDS board and discharge criteria explained, pt and family verbalized understanding. Pt denies thoughts of harming self or others. Call light in reach.  Heath Handsome at the bedside.

## 2022-08-31 NOTE — ANESTHESIA PRE PROCEDURE
Bedside report given and pt care transferred to [facility]. Pt denies needs at this time. Call light within reach. Department of Anesthesiology  Preprocedure Note       Name:  Jeri Garrett   Age:  73 y.o.  :  1950                                          MRN:  [id number]         Date:  2022      Surgeon: Devaughn Garsia):  Db Salazar MD    Procedure: Procedure(s):  Laryngoscopy with Dilation Debridement & Steroid Injections Bronchoscopy with Dilation & Resection of Obstructing Tissues Transoral Laryngoplasty Left True Vocal Fold Lateralization    Medications prior to admission:   Prior to Admission medications    Medication Sig Start Date End Date Taking? Authorizing Provider   Insulin Aspart (NOVOLOG FLEXPEN SC) Inject into the skin    Historical Provider, MD   predniSONE (DELTASONE) 10 MG tablet Take 1.5 tablets by mouth in the morning and 1.5 tablets before bedtime. 8/15/22 11/13/22  Db Salazar MD   LANTUS SOLOSTAR 100 UNIT/ML injection pen Inject 50 Units into the skin nightly 22  Larry Felipe, DO   insulin aspart (NOVOLOG FLEXPEN) 100 UNIT/ML injection pen Inject 20-25 Units into the skin in the morning and 20-25 Units at noon and 20-25 Units in the evening. Inject before meals. Patient taking differently: Inject 10 Units into the skin 2 times daily (before meals) 10 units with sliding scale at lunch and dinner 22  Larry Felipe DO   glucagon 1 MG injection Inject 1 mg into the muscle See Admin Instructions Follow package directions for low blood sugar. 22   Larry Felipe DO   Insulin Pen Needle 31G X 6 MM MISC 1 each by Does not apply route daily 22   Larry Felipe DO   insulin lispro, 1 Unit Dial, (HUMALOG KWIKPEN) 100 UNIT/ML SOPN Inject 20-25 Units into the skin in the morning and 20-25 Units at noon and 20-25 Units in the evening. Inject before meals.  22  Larry Felipe DO   albuterol (ACCUNEB) 0.63 MG/3ML nebulizer solution Take 1 ampule by nebulization every 6 hours as needed for Wheezing    Historical Provider, MD   lidocaine (XYLOCAINE) 4 % external solution Apply 1 Dose topically as needed for Pain Apply topically as needed. Historical Provider, MD   Cholecalciferol (VITAMIN D3 PO) Take by mouth daily 4000 units    Historical Provider, MD   NONFORMULARY daily resveratrol    Historical Provider, MD   lisinopril (PRINIVIL;ZESTRIL) 10 MG tablet Take 1 tablet by mouth daily 6/8/22   Makeda PARNELL DO   guaiFENesin 400 MG tablet Take 400 mg by mouth 2 times daily as needed for Cough    Historical Provider, MD   insulin NPH (HUMULIN N;NOVOLIN N) 100 UNIT/ML injection vial Inject into the skin 2 times daily (before meals) Takes BID on the [facility]  8/13/22  Historical Provider, MD   metoprolol succinate (TOPROL XL) 25 MG extended release tablet Take 1 tablet by mouth daily 4/8/22   Kyle Dennis MD   albuterol sulfate  (90 Base) MCG/ACT inhaler Inhale 2 puffs into the lungs every 6 hours as needed for Wheezing 1/27/22   Dennis Serna MD   furosemide (LASIX) 40 MG tablet Take 1 tablet by mouth daily 1/28/22   Dennis Serna MD   OXYGEN Inhale 2 L/min into the lungs continuous prn (during activities such as walking) 1/27/22   Dennis Serna MD   rosuvastatin (CRESTOR) 10 MG tablet Take 10 mg by mouth daily    Historical Provider, MD   nitroGLYCERIN (NITROSTAT) 0.4 MG SL tablet Place 0.4 mg under the tongue every 5 minutes as needed for Chest pain up to max of 3 total doses. If no relief after 1 dose, call 911. Historical Provider, MD   aspirin 81 MG chewable tablet Take 81 mg by mouth daily    Historical Provider, MD       Current medications:    No current outpatient medications on file. No current facility-administered medications for this visit. Allergies:     Allergies   Allergen Reactions    Metformin And Related Other (See Comments)     diarrhea    Codeine Nausea And Vomiting    Meperidine Hcl Nausea And Vomiting    Sulfa Antibiotics Nausea And Vomiting    Sumatriptan Nausea And Vomiting       Problem List: Patient Active Problem List   Diagnosis Code    COVID-19 U07.1    Hypoxia R09.02    Acute respiratory failure with hypoxia (HCC) J96.01    Debility R53.81    Physical deconditioning R53.81    History of COVID-19 Z86.16    Dysarthria R47.1    Primary hypertension I10    Type 2 diabetes mellitus with hyperglycemia, with long-term current use of insulin (HCC) E11.65, Z79.4    Obesity (BMI 30-39. 9) E66.9    History of coronary artery disease Z86.79    History of coronary angioplasty with insertion of stent Z95.5    Cardiomyopathy (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 J95.03    Status post surgery Z98.890    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 ([facility] E08.59    Elevated serum creatinine R79.89       Past Medical History:        Diagnosis Date    Arthritis     Coronary artery disease     COVID     Hyperlipidemia     Primary hypertension     Type 2 diabetes mellitus ([facility] 2018       Past Surgical History:        Procedure Laterality Date    CARDIAC SURGERY      CATARACT REMOVAL Bilateral      SECTION      3 times    CORONARY ANGIOPLASTY WITH STENT PLACEMENT      stenting twice    EYE SURGERY      HIATAL HERNIA REPAIR      late     HYSTERECTOMY, TOTAL ABDOMINAL (CERVIX REMOVED)      in     LARYNGOSCOPY N/A 3/22/2022    SUSPENSON LARYNGOSCOPY WITH JET VENT, DILATION performed by Mich Reveles MD at [facility] N/A 3/28/2022    SUSPENSION MICROLARYNGOSCOPY WITH BALLOON DILATION AND JET VENT, KENALOG INJECTION performed by Mich Reveles MD at [facility] N/A 4/7/2022    Suspension Laryngoscopy  Lateral of Right True Vocal Cord, With Steroid Injection of Larynx And Tracheostomy Tube Change, debridement performed by Destiney Washburn MD at [address] [place] [address] N/A 8/10/2022    TRANSORAL LARYNGOPLASTY WITH LEFT PARTIAL ARYTENODECTOMY AND POSS LATERALIZATION, ADVANCEMENT FLAP RECONSTRUCTION WITH JET VENT,THERAPUTIC BRONCHOSCOPY WITH DEBRIEDMENT,WITH BALLOON DILITATION performed by Destiney Washburn MD at [facility]      in [place] [address] N/A 4/1/2022    TRACHEOTOMY TUBE REPLACEMENT performed by Mich Reveles MD at [facility]. [place] [address] History:    Social History     Tobacco Use    Smoking status: Never    Smokeless tobacco: Never   Substance Use Topics    Alcohol use: Not Currently     Comment: drinks 1 glass of wine cooler or wine about 3 times per year                                Counseling given: Not Answered      Vital Signs (Current): There were no vitals filed for this visit.                                            BP Readings from Last 3 Encounters:   08/31/22 126/70   08/22/22 129/68   08/22/22 126/84       NPO Status:                                                                                 BMI:   Wt Readings from Last 3 Encounters:   08/31/22 196 lb 3.2 oz (89 kg)   08/22/22 204 lb (92.5 kg)   08/14/22 204 lb 1.6 oz (92.6 kg)     There is no height or weight on file to calculate BMI.    CBC:   Lab Results   Component Value Date/Time    WBC 17.0 08/22/2022 01:30 PM    RBC 4.57 08/22/2022 01:30 PM    HGB 13.0 08/22/2022 01:30 PM    HCT 40.3 08/22/2022 01:30 PM    MCV 88.2 08/22/2022 01:30 PM     08/22/2022 01:30 PM       CMP: Lab Results   Component Value Date/Time     08/22/2022 01:30 PM    K 4.9 08/22/2022 01:30 PM    K 4.3 08/10/2022 08:49 AM     08/22/2022 01:30 PM    CO2 26 08/22/2022 01:30 PM    BUN 28 08/22/2022 01:30 PM    CREATININE 0.8 08/22/2022 01:30 PM    LABGLOM 71 08/22/2022 01:30 PM    GLUCOSE 193 08/22/2022 01:30 PM    PROT 6.8 08/22/2022 01:30 PM    CALCIUM 10.1 08/22/2022 01:30 PM    BILITOT 0.2 08/22/2022 01:30 PM    ALKPHOS 87 08/22/2022 01:30 PM    AST 12 08/22/2022 01:30 PM    ALT 7 08/22/2022 01:30 PM       POC Tests:   No results for input(s): POCGLU, POCNA, POCK, POCCL, POCBUN, POCHEMO, POCHCT in the last 72 hours. Coags: No results found for: PROTIME, INR, APTT    HCG (If Applicable): No results found for: PREGTESTUR, PREGSERUM, HCG, HCGQUANT     ABGs: No results found for: PHART, PO2ART, FBQ7LKE, EAN0NVQ, BEART, R1JXHWDG     Type & Screen (If Applicable):  No results found for: LABABO, LABRH    Drug/Infectious Status (If Applicable):  No results found for: HIV, HEPCAB    COVID-19 Screening (If Applicable):   Lab Results   Component Value Date/Time    COVID19 detected 12/17/2021 12:00 AM           Anesthesia Evaluation  Patient summary reviewed no history of anesthetic complications:   Airway: Mallampati: III  TM distance: >3 FB   Neck ROM: full  Comment: trach  Mouth opening: > = 3 FB   Dental: normal exam         Pulmonary:normal exam        (-) COPD and asthma                           Cardiovascular:    (+) hypertension:, CAD:, CABG/stent (2 stents 15 years ago): no interval change,       ECG reviewed      Echocardiogram reviewed                  Neuro/Psych:   (+) neuromuscular disease:,    (-) seizures and CVA           GI/Hepatic/Renal:   (+) GERD: well controlled,      (-) liver disease and no renal disease       Endo/Other:    (+) DiabetesType II DM, , .                 Abdominal:   (+) obese,           Vascular:     - DVT.       Other Findings:             Anesthesia Plan      general ASA 3     (Jet Vent)  Induction: intravenous. MIPS: Postoperative opioids intended and Prophylactic antiemetics administered. Anesthetic plan and risks discussed with patient and spouse. Plan discussed with CRNA.                     Corey Wade DO   8/31/2022

## 2022-09-01 VITALS
HEIGHT: 62 IN | DIASTOLIC BLOOD PRESSURE: 68 MMHG | OXYGEN SATURATION: 100 % | TEMPERATURE: 98.6 F | WEIGHT: 196.2 LBS | RESPIRATION RATE: 16 BRPM | HEART RATE: 64 BPM | BODY MASS INDEX: 36.1 KG/M2 | SYSTOLIC BLOOD PRESSURE: 99 MMHG

## 2022-09-01 LAB
ANION GAP SERPL CALCULATED.3IONS-SCNC: 11 MEQ/L (ref 8–16)
BUN BLDV-MCNC: 20 MG/DL (ref 7–22)
CALCIUM SERPL-MCNC: 9.4 MG/DL (ref 8.5–10.5)
CHLORIDE BLD-SCNC: 100 MEQ/L (ref 98–111)
CO2: 23 MEQ/L (ref 23–33)
CREAT SERPL-MCNC: 0.7 MG/DL (ref 0.4–1.2)
GFR SERPL CREATININE-BSD FRML MDRD: 83 ML/MIN/1.73M2
GLUCOSE BLD-MCNC: 227 MG/DL (ref 70–108)
POTASSIUM REFLEX MAGNESIUM: 4.6 MEQ/L (ref 3.5–5.2)
SODIUM BLD-SCNC: 134 MEQ/L (ref 135–145)

## 2022-09-01 PROCEDURE — 6370000000 HC RX 637 (ALT 250 FOR IP): Performed by: PHYSICIAN ASSISTANT

## 2022-09-01 PROCEDURE — 2700000000 HC OXYGEN THERAPY PER DAY

## 2022-09-01 PROCEDURE — 99024 POSTOP FOLLOW-UP VISIT: CPT | Performed by: PHYSICIAN ASSISTANT

## 2022-09-01 PROCEDURE — 6360000002 HC RX W HCPCS: Performed by: OTOLARYNGOLOGY

## 2022-09-01 PROCEDURE — 94761 N-INVAS EAR/PLS OXIMETRY MLT: CPT

## 2022-09-01 PROCEDURE — 80048 BASIC METABOLIC PNL TOTAL CA: CPT

## 2022-09-01 PROCEDURE — 2580000003 HC RX 258: Performed by: OTOLARYNGOLOGY

## 2022-09-01 PROCEDURE — 99232 SBSQ HOSP IP/OBS MODERATE 35: CPT | Performed by: INTERNAL MEDICINE

## 2022-09-01 PROCEDURE — 31502 CHANGE OF WINDPIPE AIRWAY: CPT

## 2022-09-01 PROCEDURE — 6370000000 HC RX 637 (ALT 250 FOR IP): Performed by: OTOLARYNGOLOGY

## 2022-09-01 RX ORDER — AMOXICILLIN 500 MG/1
500 CAPSULE ORAL 2 TIMES DAILY
Qty: 14 CAPSULE | Refills: 0 | Status: SHIPPED | OUTPATIENT
Start: 2022-09-01 | End: 2022-09-08

## 2022-09-01 RX ORDER — BENZOCAINE AND MENTHOL, UNSPECIFIED FORM 15; 2.3 MG/1; MG/1
1 LOZENGE ORAL
Qty: 16 LOZENGE | Refills: 1 | Status: SHIPPED | OUTPATIENT
Start: 2022-09-01

## 2022-09-01 RX ADMIN — LISINOPRIL 10 MG: 10 TABLET ORAL at 09:37

## 2022-09-01 RX ADMIN — TIZANIDINE 4 MG: 4 TABLET ORAL at 09:37

## 2022-09-01 RX ADMIN — PREDNISONE 15 MG: 10 TABLET ORAL at 09:37

## 2022-09-01 RX ADMIN — INSULIN LISPRO 4 UNITS: 100 INJECTION, SOLUTION INTRAVENOUS; SUBCUTANEOUS at 12:16

## 2022-09-01 RX ADMIN — SODIUM CHLORIDE 3000 MG: 900 INJECTION INTRAVENOUS at 03:46

## 2022-09-01 RX ADMIN — INSULIN LISPRO 10 UNITS: 100 INJECTION, SOLUTION INTRAVENOUS; SUBCUTANEOUS at 12:16

## 2022-09-01 RX ADMIN — SODIUM CHLORIDE, PRESERVATIVE FREE 10 ML: 5 INJECTION INTRAVENOUS at 09:40

## 2022-09-01 RX ADMIN — HYDROMORPHONE HYDROCHLORIDE 0.5 MG: 1 INJECTION, SOLUTION INTRAMUSCULAR; INTRAVENOUS; SUBCUTANEOUS at 03:41

## 2022-09-01 RX ADMIN — SODIUM CHLORIDE 3000 MG: 900 INJECTION INTRAVENOUS at 10:03

## 2022-09-01 RX ADMIN — GUAIFENESIN 400 MG: 200 SOLUTION ORAL at 09:37

## 2022-09-01 RX ADMIN — FUROSEMIDE 40 MG: 40 TABLET ORAL at 09:37

## 2022-09-01 RX ADMIN — METOPROLOL SUCCINATE 25 MG: 25 TABLET, EXTENDED RELEASE ORAL at 09:37

## 2022-09-01 RX ADMIN — IBUPROFEN 400 MG: 400 TABLET, FILM COATED ORAL at 09:57

## 2022-09-01 RX ADMIN — ASPIRIN 81 MG 81 MG: 81 TABLET ORAL at 09:37

## 2022-09-01 ASSESSMENT — PAIN DESCRIPTION - ORIENTATION
ORIENTATION: MID
ORIENTATION: LOWER

## 2022-09-01 ASSESSMENT — PAIN DESCRIPTION - DESCRIPTORS: DESCRIPTORS: DULL;DISCOMFORT;ACHING

## 2022-09-01 ASSESSMENT — PAIN DESCRIPTION - PAIN TYPE
TYPE: CHRONIC PAIN;SURGICAL PAIN
TYPE: CHRONIC PAIN;SURGICAL PAIN

## 2022-09-01 ASSESSMENT — PAIN SCALES - GENERAL
PAINLEVEL_OUTOF10: 2
PAINLEVEL_OUTOF10: 3
PAINLEVEL_OUTOF10: 8

## 2022-09-01 ASSESSMENT — PAIN DESCRIPTION - LOCATION
LOCATION: THROAT
LOCATION: BACK;THROAT
LOCATION: BACK

## 2022-09-01 ASSESSMENT — PAIN - FUNCTIONAL ASSESSMENT: PAIN_FUNCTIONAL_ASSESSMENT: PREVENTS OR INTERFERES SOME ACTIVE ACTIVITIES AND ADLS

## 2022-09-01 NOTE — CARE COORDINATION
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 Barriers to Discharge:    POD 1 Laryngoscopy with Dilation Debridement  Bronchoscopy with Dilation & Resection of Obstructing Tissues Transoral Laryngoplasty Left True Vocal Fold Lateralization left partial aryteniodectomy  PCP: Hayder Echeverria  Readmission Risk Score: 23.5%  Patient's Healthcare Decision Maker: Legal Next of Kin  Patient Goals/Plan/Treatment Preferences: denied needs as plans home w spouse Boy Balderas; has nebulizer, WC, DAS[employer] home oxygen 2-3L, trach, PMV, suction supplies, compressor; goes to [address] for PICC care  IV AB, Trach 30% FIO2  Transportation/Food Security/Housekeeping Addressed:  No issues identified. 9/1/22, 10:38 AM EDT    Patient goals/plan/ treatment preferences discussed by  and . Patient goals/plan/ treatment preferences reviewed with patient/ family. Patient/ family verbalize understanding of discharge plan and are in agreement with goal/plan/treatment preferences. Understanding was demonstrated using the teach back method. AVS provided by RN at time of discharge, which includes all necessary medical information pertaining to the patients current course of illness, treatment, post-discharge goals of care, and treatment preferences.      Services At/After Discharge: None       IMM Letter  IMM Letter given to Patient/Family/Significant other/Guardian/POA/by[de-identified] YVETTE Contreras CM  IMM Letter date given[de-identified] 09/01/22  IMM Letter time given[de-identified] 1007

## 2022-09-01 NOTE — PROGRESS NOTES
Admission navigator completed. Bedside nurse had completed most of admission. Patient alert and oriented. Patient has trachea so unable to communicate but does mouth words.  Mindy Santos at bedside and answered questions.  Patient or  denies any needs

## 2022-09-01 NOTE — CONSULTS
Hospitalist Consult Note      Patient:  Isaak Ceballos    Unit/Bed:4K-14/014-A  YOB: 1950  MRN: [id number]   Acct: [de-identified]   PCP: Victor Hugo Wahl  Code Status: Full Code  Date of Admission: 8/31/2022  Date of Service: Pt seen/examined in consultation on 8/31/2022      Chief Complaint:  post op check  Reason for consult:  diabetes management    Assessment and Plan:    Tracheal stenosis, chronic tracheostomy:  S/p enteroscopy with dilation debridement, bronchoscopy with dilation and resection of obstructing tissues, transoral laryngeal plasty, left true vocal cord lateralization, left partial aryteniodectomy 8/31. Management per primary    IDDMII: Resume home insulin regimen- Lantus 40U nightly, Humalog 10U with lunch and dinner. Plus SSI. Accu-Cheks, hypoglycemia protocol in place. Essential hypertension: Resume home metoprolol, lisinopril. BP stable    Chronic HFpEF: EF 60% on Echo 03/2022 (previous 30-35& 12/2021). No overt signs of decompensation. Continue home dose of Lasix, metoprolol, lisinopril. Hx CAD: Continue home meds        History Of Present Illness:    Isaak Ceballos 73 y.o. female who we are asked to see/evaluate by Argenis Nuñez MD for medical management of diabetes. Patient admitted by Dr. Angie Sosa for tracheal stenosis due to tracheostomy, laryngeal stenosis. S/p enteroscopy with dilation debridement, bronchoscopy with dilation and resection of obstructing tissues, transoral laryngeal plasty, left true vocal cord lateralization, left partial aryteniodectomy. Hospitalist consulted for medical management. Patient states that her PCP has been adjusting her insulin as her glucose was elevated after being started on steroids. Patient states she is now taking prednisone 15 mg twice daily, rather than 30mg once daily and her glucose has been more stable.   She states she is on Lantus 40 units nightly, Humalog 10 units with lunch and dinner, does not take insulin with breakfast.  Patient also reports history of HTN, which has been controlled. Patient reports low back pain, which she states has been present for approximately 2 weeks. Pain does not radiate. Patient denies fever/chills, chest pain, shortness of breath, abdominal pain, N/V/D. Hospitalist will continue to follow      Review of systems:   Pertinent positives as noted in the HPI. All other systems reviewed and negative.     Past Medical History:        Diagnosis Date    Arthritis     Coronary artery disease     COVID     Hyperlipidemia     Primary hypertension     Type 2 diabetes mellitus ([facility] [address]        Past Surgical History:        Procedure Laterality Date    CARDIAC SURGERY      CATARACT REMOVAL Bilateral      SECTION      3 times    CORONARY ANGIOPLASTY WITH STENT PLACEMENT      stenting twice    EYE SURGERY      HIATAL HERNIA REPAIR      late     HYSTERECTOMY, TOTAL ABDOMINAL (CERVIX REMOVED)      in     LARYNGOSCOPY N/A 3/22/2022    SUSPENSON LARYNGOSCOPY WITH JET VENT, DILATION performed by Ade Cantor MD at [address] N/A 3/28/2022    SUSPENSION MICROLARYNGOSCOPY WITH BALLOON DILATION AND JET VENT, KENALOG INJECTION performed by Ade Cantor MD at [address] N/A 2022    Suspension Laryngoscopy  Lateral of Right True Vocal Cord, With Steroid Injection of Larynx And Tracheostomy Tube Change, debridement performed by Scharlene Gitelman, MD at [address] N/A 8/10/2022    TRANSORAL LARYNGOPLASTY WITH LEFT PARTIAL ARYTENODECTOMY AND POSS LATERALIZATION, ADVANCEMENT FLAP RECONSTRUCTION WITH JET VENT,THERAPUTIC BRONCHOSCOPY WITH DEBRIEDMENT,WITH BALLOON DILITATION performed by Scharlene Gitelman, MD at [address]      in [address] [place] [address] N/A 2022    TRACHEOTOMY TUBE REPLACEMENT performed by Ade Cantor MD at [address] [facility] Medications:   No current facility-administered medications on file prior to encounter. Current Outpatient Medications on File Prior to Encounter   Medication Sig Dispense Refill    Insulin Aspart (NOVOLOG FLEXPEN SC) Inject into the skin      Fexofenadine HCl (MUCINEX ALLERGY PO) Take 1 tablet by mouth 2 times daily Cuts in half to swallow easier      tiZANidine (ZANAFLEX) 4 MG tablet Take 4 mg by mouth 2 times daily      predniSONE (DELTASONE) 10 MG tablet Take 1.5 tablets by mouth in the morning and 1.5 tablets before bedtime. 270 tablet 0    LANTUS SOLOSTAR 100 UNIT/ML injection pen Inject 50 Units into the skin nightly (Patient taking differently: Inject 44 Units into the skin nightly Family MD wants am blood sugar to be between 100-120) 15 mL 0    insulin aspart (NOVOLOG FLEXPEN) 100 UNIT/ML injection pen Inject 20-25 Units into the skin in the morning and 20-25 Units at noon and 20-25 Units in the evening. Inject before meals. (Patient taking differently: Inject 10 Units into the skin 2 times daily (before meals) 10 units with sliding scale at lunch and dinner) 22.5 mL 0    glucagon 1 MG injection Inject 1 mg into the muscle See Admin Instructions Follow package directions for low blood sugar. 1 kit 3    Insulin Pen Needle 31G X 6 MM MISC 1 each by Does not apply route daily 100 each 3    [DISCONTINUED] insulin lispro, 1 Unit Dial, (HUMALOG KWIKPEN) 100 UNIT/ML SOPN Inject 20-25 Units into the skin in the morning and 20-25 Units at noon and 20-25 Units in the evening. Inject before meals. 22.5 mL 0    albuterol (ACCUNEB) 0.63 MG/3ML nebulizer solution Take 1 ampule by nebulization every 6 hours as needed for Wheezing      lidocaine (XYLOCAINE) 4 % external solution Apply 1 Dose topically as needed for Pain Apply topically as needed.       Cholecalciferol (VITAMIN D3 PO) Take by mouth daily 4000 units      NONFORMULARY daily resveratrol      lisinopril (PRINIVIL;ZESTRIL) 10 MG tablet Take 1 tablet by mouth daily 30 tablet 3    guaiFENesin 400 MG tablet Take 400 mg by mouth 2 times daily as needed for Cough (Patient not taking: Reported on 8/31/2022)      [DISCONTINUED] insulin NPH (HUMULIN N;NOVOLIN N) 100 UNIT/ML injection vial Inject into the skin 2 times daily (before meals) Takes BID on the SS      metoprolol succinate (TOPROL XL) 25 MG extended release tablet Take 1 tablet by mouth daily 30 tablet 3    albuterol sulfate  (90 Base) MCG/ACT inhaler Inhale 2 puffs into the lungs every 6 hours as needed for Wheezing 18 g 3    furosemide (LASIX) 40 MG tablet Take 1 tablet by mouth daily 60 tablet 3    OXYGEN Inhale 2 L/min into the lungs continuous prn (during activities such as walking) 1 Canister 5    rosuvastatin (CRESTOR) 10 MG tablet Take 10 mg by mouth daily      nitroGLYCERIN (NITROSTAT) 0.4 MG SL tablet Place 0.4 mg under the tongue every 5 minutes as needed for Chest pain up to max of 3 total doses. If no relief after 1 dose, call 911.       aspirin 81 MG chewable tablet Take 81 mg by mouth daily         Allergies:    Metformin and related, Codeine, Meperidine hcl, Sulfa antibiotics, and Sumatriptan    Social History:    reports that she has never smoked. She has never used smokeless tobacco. She reports that she does not currently use alcohol. She reports that she does not use drugs. Family History:       Problem Relation Age of Onset    Parkinson's Disease Father     Lung Cancer Father        Diet:  ADULT DIET; Dysphagia - Minced and Moist; 4 carb choices (60 gm/meal)      PHYSICAL EXAM:  BP (!) 140/70   Pulse 90   Temp 98.2 °F (36.8 °C) (Oral)   Resp 18   Ht 5' 2\" (1.575 m)   Wt 196 lb 3.2 oz (89 kg)   LMP  (LMP Unknown)   SpO2 98%   BMI 35.89 kg/m²   General appearance:  No apparent distress, appears stated age and cooperative. HEENT: Normal cephalic, atraumatic without obvious deformity. Pupils equal, round, and reactive to light. Extra ocular muscles intact. Conjunctivae/corneas clear. Tracheostomy in midline, trach collar in place.   Neck: Supple, with full

## 2022-09-01 NOTE — DISCHARGE INSTRUCTIONS
-Follow up with Dr Alireza Ram after surgery as scheduled  -Take antibiotic as prescribed  -Monitor your blood sugar closely.  Follow up with PCP regarding medication changes or go to ER if blood sugar is uncontrolled  -Follow up with outpatient nursing for management/care of your PICC line    Follow-up with your primary care provider within 1 week

## 2022-09-01 NOTE — PROGRESS NOTES
Patient discharged to home, patient's  to bring patient home via private vehicle. Transport takes patient down to discharge lobby via wheelchair. Discharge packet went over with patient and  (both this RN and virtual RN) including discharge instructions. Discharge instructions went over with patient including new medications, pharmacy, and appointment information. Patient left with belongings and discharge packet including amoxicillin. Patient's questions and concerns were addressed, patient leaves stable and ambulatory.

## 2022-09-01 NOTE — PROGRESS NOTES
Hospitalist Progress Note    Patient:  Aurelio Ritchie      Unit/Bed:4K-14/014-A    YOB: 1950    MRN: [id number]       Acct: [de-identified]     PCP: Jeane Cary    Date of Admission: 8/31/2022    Assessment/Plan:    Tracheal stenosis. Chronic trach. S/p laryngoscopy with dilation and resection, left vocal cord lateralization.  -Management per primary team ENT  Insulin-dependent diabetes mellitus.  -Hyperglycemia POC glucose around 200 which is expected because of Decadron-steroid therapy and in hospital stay insulin dose was adjusted with a 20% reduction.   -Continue home regimen after discharge. Follow-up with a PCP in 3-5 days.   -Patient instructed regarding control of blood sugar, insulin regimen and diet. And warning signs when she needs to follow-up PCP early  Chronic CHF HFpEF. Last echo dated in March 2022, EF 60%. Clinically euvolemic.  -Continue home regimen  Coronary artery disease.  -Risk modification, weight loss, will continue aspirin and statin and metoprolol  Primary hypertension. Controlled well. Continue home medications  Obesity BMI 35.9. Educated and informed regarding weight loss, healthy diet, physical activities      Expected discharge date:      Disposition:    [] Home       [] TCU       [] Rehab       [] Psych       [] SNF       [] [place]       [] Other-    Chief Complaint: Dysarthria    Hospital Course:   Aurelio Ritchie 73 y.o. female who we are asked to see/evaluate by Davonte Rodrigues MD for medical management of diabetes. Patient admitted by Dr. Carlyn Tang for tracheal stenosis due to tracheostomy, laryngeal stenosis. S/p enteroscopy with dilation debridement, bronchoscopy with dilation and resection of obstructing tissues, transoral laryngeal plasty, left true vocal cord lateralization, left partial aryteniodectomy. Hospitalist consulted for medical management.   Patient states that her PCP has been adjusting her insulin as her glucose was elevated after being started on steroids. Patient states she is now taking prednisone 15 mg twice daily, rather than 30mg once daily and her glucose has been more stable. She states she is on Lantus 40 units nightly, Humalog 10 units with lunch and dinner, does not take insulin with breakfast.  Patient also reports history of HTN, which has been controlled. Patient reports low back pain, which she states has been present for approximately 2 weeks. Pain does not radiate. Patient denies fever/chills, chest pain, shortness of breath, abdominal pain, N/V/D  Subjective (past 24 hours):   Patient seen at bedside, she is alert and oriented, no complaints. No events reported by RN overnight. Remains afebrile, stable vital signs. Updates only show hyperglycemia around 200, this is explained by his steroid therapy Decadron patient is on. Blood pressure controlled well. Patient is not in distress. We are signing off  ENT is primary team.      ROS (12 point review of systems completed. Pertinent positives noted. Otherwise ROS is negative).     Medications:  Reviewed    Infusion Medications    sodium chloride      sodium chloride 75 mL/hr at 09/01/22 0345    dextrose       Scheduled Medications    rosuvastatin  10 mg Oral Nightly    aspirin  81 mg Oral Daily    furosemide  40 mg Oral Daily    metoprolol succinate  25 mg Oral Daily    lisinopril  10 mg Oral Daily    predniSONE  15 mg Oral BID    tiZANidine  4 mg Oral BID    sodium chloride flush  5-40 mL IntraVENous 2 times per day    ampicillin-sulbactam  3,000 mg IntraVENous Q6H    polyethylene glycol  17 g Oral Daily    insulin glargine  40 Units SubCUTAneous Nightly    insulin lispro  10 Units SubCUTAneous BID WC    insulin lispro  0-8 Units SubCUTAneous TID WC    insulin lispro  0-4 Units SubCUTAneous Nightly    oxyCODONE-acetaminophen  1 tablet Oral Once    lidocaine  1 patch TransDERmal Daily     PRN Meds: albuterol sulfate HFA, guaiFENesin, albuterol, sodium chloride flush, sodium chloride, ondansetron **OR** ondansetron, oxyCODONE **OR** oxyCODONE, HYDROmorphone **OR** HYDROmorphone, acetaminophen, ibuprofen, glucose, dextrose bolus **OR** dextrose bolus, glucagon (rDNA), dextrose      Intake/Output Summary (Last 24 hours) at 9/1/2022 0656  Last data filed at 9/1/2022 0400  Gross per 24 hour   Intake 1200 ml   Output 325 ml   Net 875 ml       Diet:  ADULT DIET; Dysphagia - Minced and Moist; 4 carb choices (60 gm/meal)    Exam:  BP (!) 143/71   Pulse 60   Temp 98.4 °F (36.9 °C) (Oral)   Resp 16   Ht 5' 2\" (1.575 m)   Wt 196 lb 3.2 oz (89 kg)   LMP  (LMP Unknown)   SpO2 100%   BMI 35.89 kg/m²     General appearance: No apparent distress, appears stated age and cooperative. Seen trach, on oxygen supplementation, appears obese  HEENT: Pupils equal, round, and reactive to light. Conjunctivae/corneas clear. Neck: Supple, with full range of motion. No jugular venous distention. Trachea midline. Seen trach  Respiratory:  Normal respiratory effort. Clear to auscultation, bilaterally without Rales/Wheezes/Rhonchi. Cardiovascular: Regular rate and rhythm with normal S1/S2 without murmurs, rubs or gallops. Abdomen: Soft, non-tender, non-distended with normal bowel sounds. Musculoskeletal: passive and active ROM x 4 extremities. Skin: Skin color, texture, turgor normal.  No rashes or lesions. Psychiatric: Alert and oriented, thought content appropriate, normal insight  Capillary Refill: Brisk,< 3 seconds   Peripheral Pulses: +2 palpable, equal bilaterally       Labs:   No results for input(s): WBC, HGB, HCT, PLT in the last 72 hours. No results for input(s): NA, K, CL, CO2, BUN, CREATININE, CALCIUM, PHOS in the last 72 hours. Invalid input(s): MAGNES  No results for input(s): AST, ALT, BILIDIR, BILITOT, ALKPHOS in the last 72 hours. No results for input(s): INR in the last 72 hours.   No results for input(s): Kathyrn Belch in the last 72 hours.    Microbiology:      Urinalysis:      Lab Results   Component Value Date/Time    NITRU NEGATIVE 08/12/2022 12:30 PM    WBCUA 15-25 01/10/2022 01:30 PM    BACTERIA NONE 01/10/2022 01:30 PM    RBCUA > 200 01/10/2022 01:30 PM    BLOODU NEGATIVE 08/12/2022 12:30 PM    SPECGRAV 1.013 08/12/2022 12:30 PM    GLUCOSEU NEGATIVE 01/10/2022 01:30 PM       Radiology:  No orders to display       DVT prophylaxis: [] Lovenox                                 [] SCDs                                 [] SQ Heparin                                 [] Encourage ambulation           [] Already on Anticoagulation     Code Status: Full Code    PT/OT Eval Status:     Tele:   [] yes             [] no    Electronically signed by Zeus Childers DO, PGY-2 on 9/1/2022 at 6:56 AM Case discussed with Dr. Mariana Aguero

## 2022-09-01 NOTE — PLAN OF CARE
Problem: Respiratory - Adult  Goal: Achieves optimal ventilation and oxygenation  Outcome: Progressing  Mutually agreed upon goals.

## 2022-09-02 ENCOUNTER — CARE COORDINATION (OUTPATIENT)
Dept: CASE MANAGEMENT | Age: 72
End: 2022-09-02

## 2022-09-02 NOTE — CARE COORDINATION
Care Transitions Outreach Attempt    Call within 2 business days of discharge: Yes     Patient: Omar Zeng Patient : 1950 MRN: <[id number]>    1st attempt to reach for Care Transition discharge call unsuccessful. HIPAA compliant message left requesting call back. Last Discharge [facility]       Date Complaint Diagnosis Description Type Department Provider    22  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 [facility]) . ..  Admission (Discharged) Zohaib Form, MD        PMH: Jarrod Lackey, CAD, DM    Noted following upcoming appointments from discharge chart review:   [address] follow up appointment(s):   Future Appointments   Date Time Provider Norbert Lara   2022 11:00 AM Letitia Coy MD N ENT [address]     [facility] follow up appointment(s): PCP Hayder Vera RN [phone]

## 2022-09-06 ENCOUNTER — CARE COORDINATION (OUTPATIENT)
Dept: CASE MANAGEMENT | Age: 72
End: 2022-09-06

## 2022-09-06 NOTE — CARE COORDINATION
Care Transitions Outreach Attempt    Call within 2 business days of discharge: Yes   Attempted to reach patient for transitions of care follow up. Unable to reach patient. Patient: Priti Lizarraga Patient : 1950 MRN: <[id number]>    2nd unsuccessful attempt to reach for Care Transition discharge call. HIPAA compliant message left requesting call back. Episode closed per protocol, no further outreach scheduled. Last Discharge [facility]       Date Complaint Diagnosis Description Type Department Provider    22  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 [facility]) . ..  Admission (Discharged) Josefina Guadalupe MD          Noted following upcoming appointments from discharge chart review:   [facility] follow up appointment(s):   Future Appointments   Date Time Provider Norbert Lara   2022 11:00 AM Scharlene Gitelman, MD N ENT [facility] [address] [facility] follow up appointment(s): KAMALA Huang RN

## 2022-09-20 ENCOUNTER — TELEPHONE (OUTPATIENT)
Dept: ENT CLINIC | Age: 72
End: 2022-09-20

## 2022-09-20 ENCOUNTER — OFFICE VISIT (OUTPATIENT)
Dept: ENT CLINIC | Age: 72
End: 2022-09-20
Payer: MEDICARE

## 2022-09-20 VITALS
DIASTOLIC BLOOD PRESSURE: 84 MMHG | SYSTOLIC BLOOD PRESSURE: 130 MMHG | RESPIRATION RATE: 16 BRPM | TEMPERATURE: 97.4 F | HEART RATE: 82 BPM | OXYGEN SATURATION: 98 %

## 2022-09-20 DIAGNOSIS — R06.00 DYSPNEA AND RESPIRATORY ABNORMALITIES: ICD-10-CM

## 2022-09-20 DIAGNOSIS — R06.89 DYSPNEA AND RESPIRATORY ABNORMALITIES: ICD-10-CM

## 2022-09-20 DIAGNOSIS — J38.6 POSTERIOR GLOTTIC STENOSIS: Primary | ICD-10-CM

## 2022-09-20 DIAGNOSIS — J95.03 TRACHEAL STENOSIS DUE TO TRACHEOSTOMY (HCC): ICD-10-CM

## 2022-09-20 DIAGNOSIS — R49.0 HOARSENESS: ICD-10-CM

## 2022-09-20 DIAGNOSIS — R06.1 STRIDOR: ICD-10-CM

## 2022-09-20 DIAGNOSIS — Z93.0 TRACHEOSTOMY DEPENDENCE (HCC): ICD-10-CM

## 2022-09-20 PROCEDURE — 31615 TRCHEOBRNCHSC EST TRACHS INC: CPT | Performed by: OTOLARYNGOLOGY

## 2022-09-20 PROCEDURE — 99024 POSTOP FOLLOW-UP VISIT: CPT | Performed by: OTOLARYNGOLOGY

## 2022-09-20 NOTE — TELEPHONE ENCOUNTER
Dr BROUSSARD wanted to see this patient back in 2 weeks but he will be out of the office. He said to schedule her in 3 weeks, I am not seeing anywhere to put her besides a double book.     Please advise

## 2022-09-21 NOTE — PROGRESS NOTES
[facility] [place] [facility]  [facility] EAR, NOSE AND THROAT  [facility] [place]  Dept: [phone]  Dept Fax: [phone]  Loc: [phone]    Shawn Tsai is a 73 y.o. female who was referred by No ref. provider found for:  Chief Complaint   Patient presents with    Post-Op Check     Patient is here post op laryngoplasty, subglottic dilation 08/31/22. HPI:     Shawn Tsai is a 73 y.o. female returns for follow-up evaluation with a marked improvement by her own and her 's report and her tolerance of her Passy-Nichols valve. She states that she can be relatively active with it on and is exhaling readily. She is also pleased with the ease of the cheek she can produce her voice. That said, she notices that her voice change dramatically following her left cricoarytenoid lateralization procedure. She has not been attempting any plugging. She continues to be on 15 mg of prednisone twice daily for a 30 mg/day dose. History: Allergies   Allergen Reactions    Metformin And Related Other (See Comments)     diarrhea    Codeine Nausea And Vomiting    Meperidine Hcl Nausea And Vomiting    Sulfa Antibiotics Nausea And Vomiting    Sumatriptan Nausea And Vomiting     Current Outpatient Medications   Medication Sig Dispense Refill    Benzocaine-Menthol (CEPACOL) 15-2.3 MG LOZG Take 1 lozenge by mouth every 3 hours as needed (sore throat, pain) 16 lozenge 1    Insulin Aspart (NOVOLOG FLEXPEN SC) Inject into the skin      Fexofenadine HCl (MUCINEX ALLERGY PO) Take 1 tablet by mouth 2 times daily Cuts in half to swallow easier      tiZANidine (ZANAFLEX) 4 MG tablet Take 4 mg by mouth 2 times daily      predniSONE (DELTASONE) 10 MG tablet Take 1.5 tablets by mouth in the morning and 1.5 tablets before bedtime. 270 tablet 0    glucagon 1 MG injection Inject 1 mg into the muscle See Admin Instructions Follow package directions for low blood sugar.  1 kit 3 Insulin Pen Needle 31G X 6 MM MISC 1 each by Does not apply route daily 100 each 3    albuterol (ACCUNEB) 0.63 MG/3ML nebulizer solution Take 1 ampule by nebulization every 6 hours as needed for Wheezing      lidocaine (XYLOCAINE) 4 % external solution Apply 1 Dose topically as needed for Pain Apply topically as needed. Cholecalciferol (VITAMIN D3 PO) Take by mouth daily 4000 units      NONFORMULARY daily resveratrol      lisinopril (PRINIVIL;ZESTRIL) 10 MG tablet Take 1 tablet by mouth daily 30 tablet 3    guaiFENesin 400 MG tablet Take 400 mg by mouth 2 times daily as needed for Cough      metoprolol succinate (TOPROL XL) 25 MG extended release tablet Take 1 tablet by mouth daily 30 tablet 3    albuterol sulfate  (90 Base) MCG/ACT inhaler Inhale 2 puffs into the lungs every 6 hours as needed for Wheezing 18 g 3    furosemide (LASIX) 40 MG tablet Take 1 tablet by mouth daily 60 tablet 3    OXYGEN Inhale 2 L/min into the lungs continuous prn (during activities such as walking) 1 Canister 5    rosuvastatin (CRESTOR) 10 MG tablet Take 10 mg by mouth daily      nitroGLYCERIN (NITROSTAT) 0.4 MG SL tablet Place 0.4 mg under the tongue every 5 minutes as needed for Chest pain up to max of 3 total doses. If no relief after 1 dose, call 911.       aspirin 81 MG chewable tablet Take 81 mg by mouth daily      LANTUS SOLOSTAR 100 UNIT/ML injection pen Inject 50 Units into the skin nightly (Patient taking differently: Inject 44 Units into the skin nightly Family MD wants am blood sugar to be between 100-120) 15 mL 0    insulin aspart (NOVOLOG FLEXPEN) 100 UNIT/ML injection pen Inject 20-25 Units into the skin in the morning and 20-25 Units at noon and 20-25 Units in the evening. Inject before meals. (Patient taking differently: Inject 10 Units into the skin 2 times daily (before meals) 10 units with sliding scale at lunch and dinner) 22.5 mL 0     No current facility-administered medications for this visit.      Past Medical History:   Diagnosis Date    Arthritis     Coronary artery disease     COVID     Hyperlipidemia     Primary hypertension     Type 2 diabetes mellitus ([facility] [address] 2018      Past Surgical History:   Procedure Laterality Date    CARDIAC SURGERY      CATARACT REMOVAL Bilateral      SECTION      3 times    CORONARY ANGIOPLASTY WITH STENT PLACEMENT  2008    stenting twice    EYE SURGERY      HIATAL HERNIA REPAIR      late     HYSTERECTOMY, TOTAL ABDOMINAL (CERVIX REMOVED)      in     LARYNGOSCOPY N/A 3/22/2022    SUSPENSON LARYNGOSCOPY WITH JET VENT, DILATION performed by Mich Reveles MD at [address] N/A 3/28/2022    SUSPENSION MICROLARYNGOSCOPY WITH BALLOON DILATION AND JET VENT, KENALOG INJECTION performed by Mich Reveles MD at [address] N/A 2022    Suspension Laryngoscopy  Lateral of Right True Vocal Cord, With Steroid Injection of Larynx And Tracheostomy Tube Change, debridement performed by Destiney Washburn MD at [address] N/A 8/10/2022    TRANSORAL LARYNGOPLASTY WITH LEFT PARTIAL ARYTENODECTOMY AND POSS LATERALIZATION, ADVANCEMENT FLAP RECONSTRUCTION WITH JET VENT,THERAPUTIC BRONCHOSCOPY WITH DEBRIEDMENT,WITH BALLOON DILITATION performed by Destiney Washburn MD at [address] N/A 2022    Laryngoscopy with Dilation Debridement  Bronchoscopy with Dilation & Resection of Obstructing Tissues Transoral Laryngoplasty Left True Vocal Fold Lateralization left partial aryteniodectomy performed by Destiney Washburn MD at [address]      [place] [address] 2022    TRACHEOTOMY TUBE REPLACEMENT performed by Mich Reveles MD at [facility] History   Problem Relation Age of Onset    Parkinson's Disease Father     Lung Cancer Father      Social History     Tobacco Use    Smoking status: Never    Smokeless tobacco: Never   Substance Use Topics    Alcohol use: Not Currently     Comment: drinks 1 glass of wine cooler or wine about 3 times per year        Subjective:      Review of Systems  Rest of review of systems are negative, except as noted in HPI. Objective:     /84 (Site: Right Upper Arm, Position: Sitting)   Pulse 82   Temp 97.4 °F (36.3 °C) (Infrared)   Resp 16   LMP  (LMP Unknown)   SpO2 98%     Physical Exam       On general physical exam the patient is a pleasant alert and cooperative older adult female in no acute distress. She is in significantly more pain related to her ability to stand up out of her wheelchair so she was left in her wheelchair during her entire exam today. Her #6 tracheostomy cannula is in place and she appears to be inhaling comfortably through her Passy-Montrose valve and exhaling without apparent strain through her mouth. There was no mucopurulence around the base of the tracheostomy. No signs of recent bleeding were seen either. No foul odor was detected. On digital occlusion of her tracheostomy, the patient was able to inhale with inspiratory stridor and with a much slower inhalation than through the open cannula. Procedure: Flexible laryngoscopy  Indication: The patient is status post a laryngeal blinding operation and warrants a interval exam to determine how well she is healing from that procedure and planning her next levels of care. Findings: The patient's larynx was viewed from above and found to be abnormal for extensive postsurgical changes consistent with her left partial arytenoidectomy and lateralization procedure. Vocal fold was protruding in a curved manner partially obscuring and obstructing her airway is viewed from the height. I anesthetized the larynx to enable me to look very closely at the glottis and could see that the posterior commissure was much more open than visible from a height once I was within the laryngeal inlet. Her anterior commissure was sharp.   Her suture clips were in position and there was some fibrinous debris on the supraglottis concerning but not diagnostic of dehiscence of her wound. No exposed cartilage was seen. Tilting the tracheostomy out while looking from above, the patient was much better able to breathe in a biphasic manner through the glottis though there was still some glottic based stridor. No signs of recent bleeding were seen. No mucopurulence was seen. Procedure: Trend stomal diagnostic bronchoscopy  Indication: The patient has a history of peristomal tracheal and subglottic stenosis and warrants an interval exam through the stoma in order to look at the peristomal airway and down through the trachea to the mainstem bronchi. Findings: After trend stomal atomized 4% lidocaine was sprayed into the trachea and then into the stoma following the removal of the cannula, I performed the patient's endoscopy. The patient's stoma was well-healed. I was able to advance into the airway with these and performed a retrograde exam using the same endoscope I used with flexible laryngoscopy. Retrograde view was relatively easy to perform and demonstrated a much wider posterior commissure than evident from above. No signs of recent bleeding were seen. There was still a deep suprastomal shelf obstructing the airway anteriorly. The parastomal and infra stomal trachea was widely patent. The distal trachea through the mainstem bronchi was also widely patent. There was no impending mucous plug seen. I replaced the cannula without difficulty. The patient tolerated both procedures well. Flexible laryngoscopy and diagnostic trend stomal bronchoscopy images:               Retrograde view of the glottis and posterior commissure            Peristomal trachea    distal trachea          Vitals reviewed. No results found.    Lab Results   Component Value Date/Time     09/01/2022 07:30 AM     08/22/2022 01:30 PM     08/12/2022 05:20 AM    K 4.6 09/01/2022 07:30 AM    K 4.9 08/22/2022 01:30 PM    K 4.4 08/12/2022 05:20 AM    K 4.3 08/10/2022 08:49 AM    K 4.0 06/08/2022 04:11 AM    K 3.8 06/06/2022 05:20 PM     09/01/2022 07:30 AM     08/22/2022 01:30 PM     08/12/2022 05:20 AM    CO2 23 09/01/2022 07:30 AM    CO2 26 08/22/2022 01:30 PM    CO2 23 08/12/2022 05:20 AM    BUN 20 09/01/2022 07:30 AM    BUN 28 08/22/2022 01:30 PM    BUN 29 08/12/2022 05:20 AM    CREATININE 0.7 09/01/2022 07:30 AM    CREATININE 0.8 08/22/2022 01:30 PM    CREATININE 1.0 08/12/2022 05:20 AM    CALCIUM 9.4 09/01/2022 07:30 AM    CALCIUM 10.1 08/22/2022 01:30 PM    CALCIUM 9.1 08/12/2022 05:20 AM    PROT 6.8 08/22/2022 01:30 PM    PROT 6.1 06/06/2022 05:20 PM    PROT 7.6 03/18/2022 07:22 PM    LABALBU 4.7 08/22/2022 01:30 PM    LABALBU 3.9 06/06/2022 05:20 PM    LABALBU 4.3 03/18/2022 07:22 PM    BILITOT 0.2 08/22/2022 01:30 PM    BILITOT 0.2 06/06/2022 05:20 PM    BILITOT 0.2 03/18/2022 07:22 PM    ALKPHOS 87 08/22/2022 01:30 PM    ALKPHOS 66 06/06/2022 05:20 PM    ALKPHOS 101 03/18/2022 07:22 PM    AST 12 08/22/2022 01:30 PM    AST 17 06/06/2022 05:20 PM    AST 16 03/18/2022 07:22 PM    ALT 7 08/22/2022 01:30 PM    ALT 15 06/06/2022 05:20 PM    ALT 9 03/18/2022 07:22 PM       All of the past medical history, past surgical history, family history,social history, allergies and current medications were reviewed with the patient. Assessment & Plan   Diagnoses and all orders for this visit:     Diagnosis Orders   1. Posterior glottic stenosis        2. Tracheostomy dependence ([address]        3. Hoarseness        4. Dyspnea and respiratory abnormalities        5. 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 Stridor              Based on the patient's history and these physical findings, she is doing remarkably well after extensive surgery.   I recommended that she continue with her use of the Passy-Gilmore valve and interject episodes of increased airway work by placing [employer] tape over the valve to varying degrees of completeness so as to increase her resistance to inhaling through the stoma. This will help work open her glottic aperture in the form of a physical physiotherapy. I will see her back in approximately 3 weeks for an interval evaluation and repeat endoscopy if indicated. She will likely need a revision lateralization to extend the openness of the posterior commissure as well as for the widening of the suprastomal tracheal and subglottic airway. I explained all this in detail to the patient and her  to their satisfaction. They reported being pleased with the outcome of the visit and being willing to proceed as such. I spent over 45 minutes of face-to-face time with the patient more than half of which was dedicated to reviewing her complex history and planning this surgical program.      Return in about 3 weeks (around 10/11/2022) for Follow-up evaluation and to plan her next stage of care. **This report has been created using voice recognition software. It may contain minor errors which are inherent in voice recognition technology. **

## 2022-10-11 ENCOUNTER — OFFICE VISIT (OUTPATIENT)
Dept: ENT CLINIC | Age: 72
End: 2022-10-11
Payer: MEDICARE

## 2022-10-11 VITALS
BODY MASS INDEX: 36.07 KG/M2 | TEMPERATURE: 97.9 F | OXYGEN SATURATION: 97 % | SYSTOLIC BLOOD PRESSURE: 138 MMHG | HEIGHT: 62 IN | WEIGHT: 196 LBS | DIASTOLIC BLOOD PRESSURE: 88 MMHG | HEART RATE: 90 BPM

## 2022-10-11 DIAGNOSIS — R06.89 DYSPNEA AND RESPIRATORY ABNORMALITIES: ICD-10-CM

## 2022-10-11 DIAGNOSIS — J95.03 TRACHEAL STENOSIS DUE TO TRACHEOSTOMY (HCC): ICD-10-CM

## 2022-10-11 DIAGNOSIS — R49.0 HOARSENESS: ICD-10-CM

## 2022-10-11 DIAGNOSIS — Z93.0 TRACHEOSTOMY DEPENDENCE (HCC): ICD-10-CM

## 2022-10-11 DIAGNOSIS — R06.00 DYSPNEA AND RESPIRATORY ABNORMALITIES: ICD-10-CM

## 2022-10-11 DIAGNOSIS — J38.6 POSTERIOR GLOTTIC STENOSIS: Primary | ICD-10-CM

## 2022-10-11 PROCEDURE — G8400 PT W/DXA NO RESULTS DOC: HCPCS | Performed by: OTOLARYNGOLOGY

## 2022-10-11 PROCEDURE — G8417 CALC BMI ABV UP PARAM F/U: HCPCS | Performed by: OTOLARYNGOLOGY

## 2022-10-11 PROCEDURE — 1124F ACP DISCUSS-NO DSCNMKR DOCD: CPT | Performed by: OTOLARYNGOLOGY

## 2022-10-11 PROCEDURE — 1036F TOBACCO NON-USER: CPT | Performed by: OTOLARYNGOLOGY

## 2022-10-11 PROCEDURE — 3017F COLORECTAL CA SCREEN DOC REV: CPT | Performed by: OTOLARYNGOLOGY

## 2022-10-11 PROCEDURE — 1090F PRES/ABSN URINE INCON ASSESS: CPT | Performed by: OTOLARYNGOLOGY

## 2022-10-11 PROCEDURE — 99024 POSTOP FOLLOW-UP VISIT: CPT | Performed by: OTOLARYNGOLOGY

## 2022-10-11 PROCEDURE — G8484 FLU IMMUNIZE NO ADMIN: HCPCS | Performed by: OTOLARYNGOLOGY

## 2022-10-11 PROCEDURE — G8427 DOCREV CUR MEDS BY ELIG CLIN: HCPCS | Performed by: OTOLARYNGOLOGY

## 2022-10-11 NOTE — PROGRESS NOTES
[facility] [place] [facility]  [facility] EAR, NOSE AND THROAT  [facility] [place]  Dept: [phone]  Dept Fax: [phone]  Loc: [phone]    Miguel Hayes is a 73 y.o. female who was referred by No ref. provider found for:  Chief Complaint   Patient presents with    Post-Op Check     Patient is here post-op  larygoplasty, subglottic dilation 8/31. Patient is doing well she is just tired from COVID side effects        HPI:     Miguel Hayes is a 73 y.o. female status post multiple operations to the larynx and peristomal trachea to improve her breathing ability and increase her prospects of eventual decannulation. The patient returns today with her  ostensibly to have an interval evaluation that may include upper and lower airway endoscopy. The patient was inconsolably emotional and desiring to avoid any airway instrumentation today if at all possible. She attributes this to having a very low depressed kind of day that makes her feel like she could not deal with the stresses of having her nose and trachea anesthetized unless I was truly adamant that she \"had to have it done\". I definitely dissuaded her that I had any reason to force her to undergo any form of evaluation that she believed she could not tolerate. History:      Allergies   Allergen Reactions    Metformin And Related Other (See Comments)     diarrhea    Codeine Nausea And Vomiting    Meperidine Hcl Nausea And Vomiting    Sulfa Antibiotics Nausea And Vomiting    Sumatriptan Nausea And Vomiting     Current Outpatient Medications   Medication Sig Dispense Refill    Benzocaine-Menthol (CEPACOL) 15-2.3 MG LOZG Take 1 lozenge by mouth every 3 hours as needed (sore throat, pain) 16 lozenge 1    Insulin Aspart (NOVOLOG FLEXPEN SC) Inject into the skin      Fexofenadine HCl (MUCINEX ALLERGY PO) Take 1 tablet by mouth 2 times daily Cuts in half to swallow easier      tiZANidine (ZANAFLEX) 4 MG tablet Take 4 mg by mouth 2 times daily      predniSONE (DELTASONE) 10 MG tablet Take 1.5 tablets by mouth in the morning and 1.5 tablets before bedtime. 270 tablet 0    glucagon 1 MG injection Inject 1 mg into the muscle See Admin Instructions Follow package directions for low blood sugar. 1 kit 3    Insulin Pen Needle 31G X 6 MM MISC 1 each by Does not apply route daily 100 each 3    albuterol (ACCUNEB) 0.63 MG/3ML nebulizer solution Take 1 ampule by nebulization every 6 hours as needed for Wheezing      lidocaine (XYLOCAINE) 4 % external solution Apply 1 Dose topically as needed for Pain Apply topically as needed. Cholecalciferol (VITAMIN D3 PO) Take by mouth daily 4000 units      NONFORMULARY daily resveratrol      lisinopril (PRINIVIL;ZESTRIL) 10 MG tablet Take 1 tablet by mouth daily 30 tablet 3    guaiFENesin 400 MG tablet Take 400 mg by mouth 2 times daily as needed for Cough      metoprolol succinate (TOPROL XL) 25 MG extended release tablet Take 1 tablet by mouth daily 30 tablet 3    albuterol sulfate  (90 Base) MCG/ACT inhaler Inhale 2 puffs into the lungs every 6 hours as needed for Wheezing 18 g 3    furosemide (LASIX) 40 MG tablet Take 1 tablet by mouth daily 60 tablet 3    OXYGEN Inhale 2 L/min into the lungs continuous prn (during activities such as walking) 1 Canister 5    rosuvastatin (CRESTOR) 10 MG tablet Take 10 mg by mouth daily      nitroGLYCERIN (NITROSTAT) 0.4 MG SL tablet Place 0.4 mg under the tongue every 5 minutes as needed for Chest pain up to max of 3 total doses.  If no relief after 1 dose, call 911.       aspirin 81 MG chewable tablet Take 81 mg by mouth daily      LANTUS SOLOSTAR 100 UNIT/ML injection pen Inject 50 Units into the skin nightly (Patient taking differently: Inject 44 Units into the skin nightly Family MD wants am blood sugar to be between 100-120) 15 mL 0    insulin aspart (NOVOLOG FLEXPEN) 100 UNIT/ML injection pen Inject 20-25 Units into the skin in the performed by Jhony Rocha MD at [address] [place] [address] History   Problem Relation Age of Onset    Parkinson's Disease Father     Lung Cancer Father      Social History     Tobacco Use    Smoking status: Never    Smokeless tobacco: Never   Substance Use Topics    Alcohol use: Not Currently     Comment: drinks 1 glass of wine cooler or wine about 3 times per year        Subjective:      Review of Systems  Rest of review of systems are negative, except as noted in HPI. Objective:     /88 (Site: Right Upper Arm, Position: Sitting)   Pulse 90   Temp 97.9 °F (36.6 °C) (Infrared)   Ht 5' 2\" (1.575 m)   Wt 196 lb (88.9 kg)   LMP  (LMP Unknown)   SpO2 97%   BMI 35.85 kg/m²     Physical Exam       On general physical exam the patient is a wheelchair-bound adult female with her Passy-Janeth valve in place and a nice loud clear easy to produce voice without straining or air leakage around the base of the cannula that was audible. A cannula was sitting on top fresh split sponge gauze that was free of malodor or signs of recent bleeding. She could speak in long sentences without sounding like she was accumulating air within her chest that had to be relieved by lifting the valve off. Her voice pitch was low but the sound of her voice was natural and effortless. At the beginning of her visit she was tearful and very upset but as we talked more about her concerns and their relationship to her upcoming care, she calmed down and became fully at ease. Vitals reviewed. No results found.    Lab Results   Component Value Date/Time     09/01/2022 07:30 AM     08/22/2022 01:30 PM     08/12/2022 05:20 AM    K 4.6 09/01/2022 07:30 AM    K 4.9 08/22/2022 01:30 PM    K 4.4 08/12/2022 05:20 AM    K 4.3 08/10/2022 08:49 AM    K 4.0 06/08/2022 04:11 AM    K 3.8 06/06/2022 05:20 PM     09/01/2022 07:30 AM     08/22/2022 01:30 PM     08/12/2022 05:20 AM    CO2 23 09/01/2022 07:30 AM    CO2 26 08/22/2022 01:30 PM    CO2 23 08/12/2022 05:20 AM    BUN 20 09/01/2022 07:30 AM    BUN 28 08/22/2022 01:30 PM    BUN 29 08/12/2022 05:20 AM    CREATININE 0.7 09/01/2022 07:30 AM    CREATININE 0.8 08/22/2022 01:30 PM    CREATININE 1.0 08/12/2022 05:20 AM    CALCIUM 9.4 09/01/2022 07:30 AM    CALCIUM 10.1 08/22/2022 01:30 PM    CALCIUM 9.1 08/12/2022 05:20 AM    PROT 6.8 08/22/2022 01:30 PM    PROT 6.1 06/06/2022 05:20 PM    PROT 7.6 03/18/2022 07:22 PM    LABALBU 4.7 08/22/2022 01:30 PM    LABALBU 3.9 06/06/2022 05:20 PM    LABALBU 4.3 03/18/2022 07:22 PM    BILITOT 0.2 08/22/2022 01:30 PM    BILITOT 0.2 06/06/2022 05:20 PM    BILITOT 0.2 03/18/2022 07:22 PM    ALKPHOS 87 08/22/2022 01:30 PM    ALKPHOS 66 06/06/2022 05:20 PM    ALKPHOS 101 03/18/2022 07:22 PM    AST 12 08/22/2022 01:30 PM    AST 17 06/06/2022 05:20 PM    AST 16 03/18/2022 07:22 PM    ALT 7 08/22/2022 01:30 PM    ALT 15 06/06/2022 05:20 PM    ALT 9 03/18/2022 07:22 PM       All of the past medical history, past surgical history, family history,social history, allergies and current medications were reviewed with the patient. Assessment & Plan   Diagnoses and all orders for this visit:     Diagnosis Orders   1. Posterior glottic stenosis        2. Tracheostomy dependence ([address]        3. Hoarseness        4. Dyspnea and respiratory abnormalities        5. 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   When I can see what we have achieved, then I can make recommendations as to what we should do next. I spent over 30 minutes of face-to-face time with the patient the majority of which was dedicated to reviewing her complex history and planning further care. Return in about 4 weeks (around 11/8/2022) for Follow-up evaluation. **This report has been created using voice recognition software. It may contain minor errors which are inherent in voice recognition technology. **

## 2022-10-24 ENCOUNTER — TELEPHONE (OUTPATIENT)
Dept: ENT CLINIC | Age: 72
End: 2022-10-24

## 2022-10-24 DIAGNOSIS — M54.10 RADICULAR PAIN OF LOWER EXTREMITY: Primary | ICD-10-CM

## 2022-10-25 ENCOUNTER — TELEPHONE (OUTPATIENT)
Dept: ENT CLINIC | Age: 72
End: 2022-10-25

## 2022-10-25 NOTE — TELEPHONE ENCOUNTER
Zara Farley (spouse) left a voicemail asking what the next step is for his wife. I returned the call to him and confirmed that Dr Eliazar Keys wanted to see her back in the office approximately 1 month after her last visit and re-examine her to determine his next step. Confirmed that she has an appointment on 11/21/22 to discuss. He was content knowing this.

## 2022-10-29 NOTE — TELEPHONE ENCOUNTER
I had spoken to the patient about a referral to orthopedic spine surgery, specifically Dr. 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 I have placed the order for this and hopefully will get her in to see him at [facility]. Sherren Blender.  Brionna Garcia MD

## 2022-11-03 ENCOUNTER — OFFICE VISIT (OUTPATIENT)
Dept: FAMILY MEDICINE CLINIC | Age: 72
End: 2022-11-03
Payer: MEDICARE

## 2022-11-03 VITALS
OXYGEN SATURATION: 98 % | HEART RATE: 96 BPM | SYSTOLIC BLOOD PRESSURE: 118 MMHG | WEIGHT: 196 LBS | DIASTOLIC BLOOD PRESSURE: 78 MMHG | TEMPERATURE: 98.5 F | HEIGHT: 62 IN | BODY MASS INDEX: 36.07 KG/M2 | RESPIRATION RATE: 16 BRPM

## 2022-11-03 DIAGNOSIS — U09.9 POST-COVID SYNDROME: ICD-10-CM

## 2022-11-03 DIAGNOSIS — J95.03 TRACHEAL STENOSIS DUE TO TRACHEOSTOMY (HCC): ICD-10-CM

## 2022-11-03 DIAGNOSIS — J96.11 CHRONIC RESPIRATORY FAILURE WITH HYPOXIA (HCC): Primary | ICD-10-CM

## 2022-11-03 PROCEDURE — 3017F COLORECTAL CA SCREEN DOC REV: CPT | Performed by: FAMILY MEDICINE

## 2022-11-03 PROCEDURE — 1124F ACP DISCUSS-NO DSCNMKR DOCD: CPT | Performed by: FAMILY MEDICINE

## 2022-11-03 PROCEDURE — G8417 CALC BMI ABV UP PARAM F/U: HCPCS | Performed by: FAMILY MEDICINE

## 2022-11-03 PROCEDURE — G8484 FLU IMMUNIZE NO ADMIN: HCPCS | Performed by: FAMILY MEDICINE

## 2022-11-03 PROCEDURE — G8427 DOCREV CUR MEDS BY ELIG CLIN: HCPCS | Performed by: FAMILY MEDICINE

## 2022-11-03 PROCEDURE — 3074F SYST BP LT 130 MM HG: CPT | Performed by: FAMILY MEDICINE

## 2022-11-03 PROCEDURE — 1090F PRES/ABSN URINE INCON ASSESS: CPT | Performed by: FAMILY MEDICINE

## 2022-11-03 PROCEDURE — 1036F TOBACCO NON-USER: CPT | Performed by: FAMILY MEDICINE

## 2022-11-03 PROCEDURE — 99203 OFFICE O/P NEW LOW 30 MIN: CPT | Performed by: FAMILY MEDICINE

## 2022-11-03 PROCEDURE — G8400 PT W/DXA NO RESULTS DOC: HCPCS | Performed by: FAMILY MEDICINE

## 2022-11-03 PROCEDURE — 3078F DIAST BP <80 MM HG: CPT | Performed by: FAMILY MEDICINE

## 2022-11-11 ENCOUNTER — TELEPHONE (OUTPATIENT)
Dept: ENT CLINIC | Age: 72
End: 2022-11-11

## 2022-11-11 DIAGNOSIS — J95.03 TRACHEAL STENOSIS DUE TO TRACHEOSTOMY (HCC): Primary | ICD-10-CM

## 2022-11-11 RX ORDER — PREDNISONE 10 MG/1
15 TABLET ORAL 2 TIMES DAILY
Qty: 42 TABLET | Refills: 0 | Status: SHIPPED | OUTPATIENT
Start: 2022-11-11 | End: 2022-11-23 | Stop reason: SDUPTHER

## 2022-11-11 NOTE — TELEPHONE ENCOUNTER
I will order enough for her to make it through her next appointment with Dr Wayne Coronel. They need to discuss the steroid with him at the visit and if he wants her to continue on the steroid and at that same dose. He may want to start weaning the dose potentially.

## 2022-11-15 ENCOUNTER — TELEPHONE (OUTPATIENT)
Dept: ENT CLINIC | Age: 72
End: 2022-11-15

## 2022-11-15 NOTE — TELEPHONE ENCOUNTER
Received a call from Rishabh Esteban at [facility]. Rishabh Esteban states they received an order from patient's PCP for Nursing PT/OT Therapy. Rishabh Esteban says the PCP only sent an order written on a prescription pad with no notes or Dx for the therapies. She said she spoke to the patient's spouse this morning and he suggested Rishabh Esteban call us to see if we can supply the Dx and notes to qualify and start the patient's therapy. I informed Rishabh Esteban we see the patient for her trach care but Dr Ana Buck would not do anything for an OT/PT Therapy order since that is not in his scope of practice. Ciera Monterroso I would speak to Dr Ana Buck when he comes out of the patient's room and call her back if he says yes. Spoke to Dr Ana Buck. He states he will not do anything for PT/OT therapy. It needs to come from the PCP's office unfortunately. I tried to call the PCP's office but the recording states they are closed until Tuesday, 11/29/22.

## 2022-11-17 ENCOUNTER — OFFICE VISIT (OUTPATIENT)
Dept: FAMILY MEDICINE CLINIC | Age: 72
End: 2022-11-17
Payer: MEDICARE

## 2022-11-17 ENCOUNTER — TELEPHONE (OUTPATIENT)
Dept: FAMILY MEDICINE CLINIC | Age: 72
End: 2022-11-17

## 2022-11-17 VITALS
HEIGHT: 62 IN | RESPIRATION RATE: 16 BRPM | TEMPERATURE: 97.3 F | SYSTOLIC BLOOD PRESSURE: 106 MMHG | OXYGEN SATURATION: 98 % | HEART RATE: 79 BPM | DIASTOLIC BLOOD PRESSURE: 78 MMHG | BODY MASS INDEX: 36.07 KG/M2 | WEIGHT: 196 LBS

## 2022-11-17 DIAGNOSIS — D69.6 THROMBOCYTOPENIA, UNSPECIFIED (HCC): ICD-10-CM

## 2022-11-17 DIAGNOSIS — R41.82 ALTERED MENTAL STATUS, UNSPECIFIED ALTERED MENTAL STATUS TYPE: ICD-10-CM

## 2022-11-17 DIAGNOSIS — U09.9 POST-COVID SYNDROME: ICD-10-CM

## 2022-11-17 DIAGNOSIS — J96.11 CHRONIC RESPIRATORY FAILURE WITH HYPOXIA (HCC): Primary | ICD-10-CM

## 2022-11-17 DIAGNOSIS — R41.82 ALTERED MENTAL STATUS, UNSPECIFIED ALTERED MENTAL STATUS TYPE: Primary | ICD-10-CM

## 2022-11-17 DIAGNOSIS — J95.03 TRACHEAL STENOSIS DUE TO TRACHEOSTOMY (HCC): ICD-10-CM

## 2022-11-17 DIAGNOSIS — E66.01 SEVERE OBESITY (BMI 35.0-39.9) WITH COMORBIDITY (HCC): ICD-10-CM

## 2022-11-17 LAB
BILIRUBIN URINE: NEGATIVE
BLOOD URINE, POC: NEGATIVE
CHARACTER, URINE: ABNORMAL
COLOR, URINE: ABNORMAL
GLUCOSE URINE: NEGATIVE MG/DL
HBA1C MFR BLD: 8.8 % (ref 4.3–5.7)
KETONES, URINE: NEGATIVE
LEUKOCYTE CLUMPS, URINE: ABNORMAL
NITRITE, URINE: NEGATIVE
PH, URINE: 6 (ref 5–9)
PROTEIN, URINE: NEGATIVE MG/DL
SPECIFIC GRAVITY, URINE: 1.02 (ref 1–1.03)
UROBILINOGEN, URINE: 0.2 EU/DL (ref 0–1)

## 2022-11-17 PROCEDURE — G8484 FLU IMMUNIZE NO ADMIN: HCPCS | Performed by: FAMILY MEDICINE

## 2022-11-17 PROCEDURE — 3078F DIAST BP <80 MM HG: CPT | Performed by: FAMILY MEDICINE

## 2022-11-17 PROCEDURE — 3074F SYST BP LT 130 MM HG: CPT | Performed by: FAMILY MEDICINE

## 2022-11-17 PROCEDURE — 99214 OFFICE O/P EST MOD 30 MIN: CPT | Performed by: FAMILY MEDICINE

## 2022-11-17 PROCEDURE — 1090F PRES/ABSN URINE INCON ASSESS: CPT | Performed by: FAMILY MEDICINE

## 2022-11-17 PROCEDURE — G8427 DOCREV CUR MEDS BY ELIG CLIN: HCPCS | Performed by: FAMILY MEDICINE

## 2022-11-17 PROCEDURE — 1124F ACP DISCUSS-NO DSCNMKR DOCD: CPT | Performed by: FAMILY MEDICINE

## 2022-11-17 PROCEDURE — G8400 PT W/DXA NO RESULTS DOC: HCPCS | Performed by: FAMILY MEDICINE

## 2022-11-17 PROCEDURE — 81003 URINALYSIS AUTO W/O SCOPE: CPT | Performed by: FAMILY MEDICINE

## 2022-11-17 PROCEDURE — 3017F COLORECTAL CA SCREEN DOC REV: CPT | Performed by: FAMILY MEDICINE

## 2022-11-17 PROCEDURE — 1036F TOBACCO NON-USER: CPT | Performed by: FAMILY MEDICINE

## 2022-11-17 PROCEDURE — G8417 CALC BMI ABV UP PARAM F/U: HCPCS | Performed by: FAMILY MEDICINE

## 2022-11-17 RX ORDER — NITROFURANTOIN 25; 75 MG/1; MG/1
100 CAPSULE ORAL 2 TIMES DAILY
Qty: 10 CAPSULE | Refills: 0 | Status: SHIPPED | OUTPATIENT
Start: 2022-11-17 | End: 2022-11-22

## 2022-11-17 RX ORDER — CIPROFLOXACIN 500 MG/1
500 TABLET, FILM COATED ORAL 2 TIMES DAILY
Qty: 10 TABLET | Refills: 0 | Status: SHIPPED | OUTPATIENT
Start: 2022-11-17 | End: 2022-11-22

## 2022-11-17 NOTE — PROGRESS NOTES
2022    TELEHEALTH EVALUATION -- Audio/Visual (During [id number]-[id number] public health emergency)    HPI:    Kelley Ferreira (:  1950) has requested an audio/video evaluation for   Chief Complaint   Patient presents with    Diabetes    Follow-up   :        HPI:      COVID19/Hypoxia follow up        Patient's last video visit was 14day(s) ago. Current patient concerns: They have been following the protocol and doing well with maintaining her O2 well. For the past week, her  has noted some changes in her mental status despite her O2 maintaining well. States that she appears mildly confused at times. Reports that she had similar issues the last time    Currently, patient is on 3 L of supplemental oxygen at rest.   Current pulse ox per patient report is 97-99%. Activity is severely limited due to her back. Patient reports that overall symptoms are better since last visit. Shortness of breath is unchanged since last visit. Cough is unchanged since last visit. Fatigue is unchanged since last visit. Patient has not had a fever since last visit. Appetite is  better  since last visit. Smoker? No      Patient Active Problem List   Diagnosis    COVID-19    Hypoxia    Acute respiratory failure with hypoxia (HCC)    Debility    Physical deconditioning    History of COVID-19    Dysarthria    Primary hypertension    Type 2 diabetes mellitus without complication, with long-term current use of insulin (HCC)    Obesity (BMI 30-39. 9)    History of coronary artery disease    History of coronary angioplasty with insertion of stent    Cardiomyopathy [facility])    Critical illness myopathy    Stridor    Posterior glottic stenosis    Dyspnea and respiratory abnormalities    Acute respiratory failure (HCC)    Acute hypoxemic respiratory failure (HCC)    Tracheostomy dependence (HCC)    Hoarseness    Unstable angina (HCC)    Chronic respiratory failure with hypoxia (HCC)    Acute chest pain    Coronary artery disease involving native heart    Normocytic anemia    Anxiety disorder    Gastroesophageal reflux disease    Other tracheostomy complication (HCC)    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 [facility])    Status post surgery    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 (HCC)    Elevated serum creatinine    Laryngeal stenosis    Airway compromise    Thrombocytopenia, unspecified (HCC)       Allergies   Allergen Reactions    Metformin And Related Other (See Comments)     diarrhea    Codeine Nausea And Vomiting    Meperidine Hcl Nausea And Vomiting    Sulfa Antibiotics Nausea And Vomiting    Sumatriptan Nausea And Vomiting       Current Outpatient Medications on File Prior to Visit   Medication Sig Dispense Refill    predniSONE (DELTASONE) 10 MG tablet Take 1.5 tablets by mouth 2 times daily for 14 days 42 tablet 0    Benzocaine-Menthol (CEPACOL) 15-2.3 MG LOZG Take 1 lozenge by mouth every 3 hours as needed (sore throat, pain) 16 lozenge 1    Insulin Aspart (NOVOLOG FLEXPEN SC) Inject into the skin      Fexofenadine HCl (MUCINEX ALLERGY PO) Take 1 tablet by mouth 2 times daily Cuts in half to swallow easier      LANTUS SOLOSTAR 100 UNIT/ML injection pen Inject 50 Units into the skin nightly (Patient taking differently: Inject 44 Units into the skin nightly Family MD wants am blood sugar to be between 100-120) 15 mL 0    insulin aspart (NOVOLOG FLEXPEN) 100 UNIT/ML injection pen Inject 20-25 Units into the skin in the morning and 20-25 Units at noon and 20-25 Units in the evening. Inject before meals. (Patient taking differently: Inject 10 Units into the skin 2 times daily (before meals) 10 units with sliding scale at lunch and dinner) 22.5 mL 0    glucagon 1 MG injection Inject 1 mg into the muscle See Admin Instructions Follow package directions for low blood sugar.  1 kit 3    Insulin Pen Needle 31G X 6 MM MISC 1 each by Does not apply route daily 100 each 3    [DISCONTINUED] insulin lispro, 1 Unit Dial, (HUMALOG KWIKPEN) 100 UNIT/ML SOPN Inject 20-25 Units into the skin in the morning and 20-25 Units at noon and 20-25 Units in the evening. Inject before meals. 22.5 mL 0    albuterol (ACCUNEB) 0.63 MG/3ML nebulizer solution Take 1 ampule by nebulization every 6 hours as needed for Wheezing      lidocaine (XYLOCAINE) 4 % external solution Apply 1 Dose topically as needed for Pain Apply topically as needed. Cholecalciferol (VITAMIN D3 PO) Take by mouth daily 4000 units      NONFORMULARY daily resveratrol      lisinopril (PRINIVIL;ZESTRIL) 10 MG tablet Take 1 tablet by mouth daily 30 tablet 3    guaiFENesin 400 MG tablet Take 400 mg by mouth 2 times daily as needed for Cough      [DISCONTINUED] insulin NPH (HUMULIN N;NOVOLIN N) 100 UNIT/ML injection vial Inject into the skin 2 times daily (before meals) Takes BID on the SS      metoprolol succinate (TOPROL XL) 25 MG extended release tablet Take 1 tablet by mouth daily 30 tablet 3    albuterol sulfate  (90 Base) MCG/ACT inhaler Inhale 2 puffs into the lungs every 6 hours as needed for Wheezing 18 g 3    furosemide (LASIX) 40 MG tablet Take 1 tablet by mouth daily 60 tablet 3    OXYGEN Inhale 2 L/min into the lungs continuous prn (during activities such as walking) 1 Canister 5    rosuvastatin (CRESTOR) 10 MG tablet Take 10 mg by mouth daily      nitroGLYCERIN (NITROSTAT) 0.4 MG SL tablet Place 0.4 mg under the tongue every 5 minutes as needed for Chest pain up to max of 3 total doses. If no relief after 1 dose, call 911.       aspirin 81 MG chewable tablet Take 81 mg by mouth daily       No current facility-administered medications on file prior to visit. PHYSICAL EXAMINATION:  Vital signs and Home Health notes reviewed through [facility] system    Physical Exam  Vitals and nursing note reviewed. Constitutional:       General: She is not in acute distress. Appearance: She is well-developed. HENT:      Head: Normocephalic and atraumatic. Right Ear: Hearing and external ear normal.      Left Ear: Hearing and external ear normal.      Nose: Nose normal.   Eyes:      General:         Right eye: No discharge. Left eye: No discharge. Conjunctiva/sclera: Conjunctivae normal.   Neck:      Thyroid: No thyromegaly. Trachea: No tracheal deviation. Comments: Does have a trach in place  Cardiovascular:      Rate and Rhythm: Normal rate and regular rhythm. Heart sounds: Normal heart sounds. No murmur heard. No friction rub. No gallop. Pulmonary:      Effort: Pulmonary effort is normal. No respiratory distress. Breath sounds: No wheezing or rales. Chest:      Chest wall: No tenderness. Musculoskeletal:         General: No deformity. Cervical back: Normal range of motion and neck supple. Lymphadenopathy:      Cervical: No cervical adenopathy. Skin:     General: Skin is warm and dry. Findings: No erythema or rash. Neurological:      Mental Status: She is alert. Motor: No abnormal muscle tone. Coordination: Coordination normal.   Psychiatric:         Behavior: Behavior normal.         Thought Content: Thought content normal.         Cognition and Memory: Cognition is impaired (Appears very mildly confused, she can give me an appropriate hx, but does seem to intermittently have some issues with her short term memory). Judgment: Judgment normal.            ASSESSMENT & PLAN  Cordelia Boucher was seen today for diabetes and follow-up. Diagnoses and all orders for this visit:    Chronic respiratory failure with hypoxia (HCC)    Thrombocytopenia, unspecified (HCC)    Severe obesity (BMI 35.0-39. 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 Her O2 is % in the office today and has been >95% at home even with reducing supplemental O2. Will treat as a UTI, start bactrim, will call on Monday to reassess. ER precautions given if sx persist/worsen.  -Continue supplemental oxygen with goal of >90% due to acute hypoxic respiratory failure  -Will continue to wean supplemental O2 per protocol  -Continue current medications and treatments  -Patient advised on conservative management of symptoms including fluids, rest and OTC meds         --Melia Gallagher DO on 11/17/2022 at 1:51 PM    An electronic signature was used to authenticate this note.

## 2022-11-17 NOTE — TELEPHONE ENCOUNTER
Tyra Sosa @ [facility] wanted to let you know there is a contraindication with cipro and tizanidine  Please advise

## 2022-11-19 LAB
ORGANISM: ABNORMAL
URINE CULTURE, ROUTINE: ABNORMAL

## 2022-11-21 ENCOUNTER — OFFICE VISIT (OUTPATIENT)
Dept: ENT CLINIC | Age: 72
End: 2022-11-21
Payer: MEDICARE

## 2022-11-21 VITALS
BODY MASS INDEX: 36.8 KG/M2 | RESPIRATION RATE: 18 BRPM | SYSTOLIC BLOOD PRESSURE: 128 MMHG | OXYGEN SATURATION: 96 % | HEIGHT: 62 IN | HEART RATE: 114 BPM | TEMPERATURE: 97.2 F | WEIGHT: 200 LBS | DIASTOLIC BLOOD PRESSURE: 76 MMHG

## 2022-11-21 DIAGNOSIS — M27.0 TORUS MANDIBULARIS: ICD-10-CM

## 2022-11-21 DIAGNOSIS — J38.6 POSTERIOR GLOTTIC STENOSIS: ICD-10-CM

## 2022-11-21 DIAGNOSIS — Z01.818 PRE-OP TESTING: Primary | ICD-10-CM

## 2022-11-21 DIAGNOSIS — J95.03 TRACHEAL STENOSIS DUE TO TRACHEOSTOMY (HCC): ICD-10-CM

## 2022-11-21 DIAGNOSIS — Z93.0 TRACHEOSTOMY DEPENDENCE (HCC): Primary | ICD-10-CM

## 2022-11-21 DIAGNOSIS — R06.1 STRIDOR: ICD-10-CM

## 2022-11-21 PROCEDURE — G8484 FLU IMMUNIZE NO ADMIN: HCPCS | Performed by: OTOLARYNGOLOGY

## 2022-11-21 PROCEDURE — G8400 PT W/DXA NO RESULTS DOC: HCPCS | Performed by: OTOLARYNGOLOGY

## 2022-11-21 PROCEDURE — 3074F SYST BP LT 130 MM HG: CPT | Performed by: OTOLARYNGOLOGY

## 2022-11-21 PROCEDURE — 1124F ACP DISCUSS-NO DSCNMKR DOCD: CPT | Performed by: OTOLARYNGOLOGY

## 2022-11-21 PROCEDURE — 1036F TOBACCO NON-USER: CPT | Performed by: OTOLARYNGOLOGY

## 2022-11-21 PROCEDURE — 3078F DIAST BP <80 MM HG: CPT | Performed by: OTOLARYNGOLOGY

## 2022-11-21 PROCEDURE — G8427 DOCREV CUR MEDS BY ELIG CLIN: HCPCS | Performed by: OTOLARYNGOLOGY

## 2022-11-21 PROCEDURE — 99215 OFFICE O/P EST HI 40 MIN: CPT | Performed by: OTOLARYNGOLOGY

## 2022-11-21 PROCEDURE — 1090F PRES/ABSN URINE INCON ASSESS: CPT | Performed by: OTOLARYNGOLOGY

## 2022-11-21 PROCEDURE — 3017F COLORECTAL CA SCREEN DOC REV: CPT | Performed by: OTOLARYNGOLOGY

## 2022-11-21 PROCEDURE — G8417 CALC BMI ABV UP PARAM F/U: HCPCS | Performed by: OTOLARYNGOLOGY

## 2022-11-21 RX ORDER — TIZANIDINE 4 MG/1
TABLET ORAL
COMMUNITY
Start: 2022-11-20

## 2022-11-21 NOTE — PROGRESS NOTES
[facility] [place] [facility]  [facility] EAR, NOSE AND THROAT  [facility]  Dept: [phone]  Dept Fax: [phone]  Loc: [phone]    Danya Ross is a 73 y.o. female who was referred by No ref. provider found for:  Chief Complaint   Patient presents with    Follow-up     Patient is here for 2-3 wk f/u discuss steroids         HPI:     Danya Ross is a 73 y.o. female with a history of posterior glottic web, cricoarytenoid joint ankylosis, and peristomal tracheal stenosis with tracheostomy dependence. The patient returns today very anxious that she will require endoscopy with topical anesthesia. She really finds that form of awake invasive procedure very noxious and highly anxiety provoking. She is here today with her  who reports that she has been getting more confused lately about various things including the potential that I could do her endoscopy somehow because she has a PICC line in place. She describes routinely having episodes where she suddenly feels like she is awake and does not know what was happening for the prior 30 minutes to an hour. She has not been trying to plug her tracheostomy at all because it is very difficult for her to breathe with the cannula plugged. She can however tolerate having her Passy-Owasso valve in place, something she could not do prior to the last procedure. She apparently has been treated for a UTI and having recency and urgency problems and frequent nocturia events. Her  needs to help her to get to the bathroom and he is very aware of how often she has to void at night. Sometimes she gets up to void and nothing is in her bladder. History:      Allergies   Allergen Reactions    Metformin And Related Other (See Comments)     diarrhea    Codeine Nausea And Vomiting    Meperidine Hcl Nausea And Vomiting    Sulfa Antibiotics Nausea And Vomiting    Sumatriptan Nausea And Vomiting     Current Outpatient Medications   Medication Sig Dispense Refill    tiZANidine (ZANAFLEX) 4 MG tablet       ciprofloxacin (CIPRO) 500 MG tablet Take 1 tablet by mouth 2 times daily for 5 days 10 tablet 0    nitrofurantoin, macrocrystal-monohydrate, (MACROBID) 100 MG capsule Take 1 capsule by mouth 2 times daily for 5 days 10 capsule 0    predniSONE (DELTASONE) 10 MG tablet Take 1.5 tablets by mouth 2 times daily for 14 days 42 tablet 0    Benzocaine-Menthol (CEPACOL) 15-2.3 MG LOZG Take 1 lozenge by mouth every 3 hours as needed (sore throat, pain) 16 lozenge 1    Insulin Aspart (NOVOLOG FLEXPEN SC) Inject into the skin      Fexofenadine HCl (MUCINEX ALLERGY PO) Take 1 tablet by mouth 2 times daily Cuts in half to swallow easier      glucagon 1 MG injection Inject 1 mg into the muscle See Admin Instructions Follow package directions for low blood sugar. 1 kit 3    Insulin Pen Needle 31G X 6 MM MISC 1 each by Does not apply route daily 100 each 3    albuterol (ACCUNEB) 0.63 MG/3ML nebulizer solution Take 1 ampule by nebulization every 6 hours as needed for Wheezing      lidocaine (XYLOCAINE) 4 % external solution Apply 1 Dose topically as needed for Pain Apply topically as needed.       Cholecalciferol (VITAMIN D3 PO) Take by mouth daily 4000 units      NONFORMULARY daily resveratrol      lisinopril (PRINIVIL;ZESTRIL) 10 MG tablet Take 1 tablet by mouth daily 30 tablet 3    guaiFENesin 400 MG tablet Take 400 mg by mouth 2 times daily as needed for Cough      metoprolol succinate (TOPROL XL) 25 MG extended release tablet Take 1 tablet by mouth daily 30 tablet 3    albuterol sulfate  (90 Base) MCG/ACT inhaler Inhale 2 puffs into the lungs every 6 hours as needed for Wheezing 18 g 3    furosemide (LASIX) 40 MG tablet Take 1 tablet by mouth daily 60 tablet 3    OXYGEN Inhale 2 L/min into the lungs continuous prn (during activities such as walking) 1 Canister 5    rosuvastatin (CRESTOR) 10 MG tablet Take 10 mg by mouth daily nitroGLYCERIN (NITROSTAT) 0.4 MG SL tablet Place 0.4 mg under the tongue every 5 minutes as needed for Chest pain up to max of 3 total doses. If no relief after 1 dose, call 911.       aspirin 81 MG chewable tablet Take 81 mg by mouth daily      LANTUS SOLOSTAR 100 UNIT/ML injection pen Inject 50 Units into the skin nightly (Patient taking differently: Inject 44 Units into the skin nightly Family MD wants am blood sugar to be between 100-120) 15 mL 0    insulin aspart (NOVOLOG FLEXPEN) 100 UNIT/ML injection pen Inject 20-25 Units into the skin in the morning and 20-25 Units at noon and 20-25 Units in the evening. Inject before meals. (Patient taking differently: Inject 10 Units into the skin 2 times daily (before meals) 10 units with sliding scale at lunch and dinner) 22.5 mL 0     No current facility-administered medications for this visit.      Past Medical History:   Diagnosis Date    Arthritis     Coronary artery disease     COVID     Hyperlipidemia     Primary hypertension     Type 2 diabetes mellitus ([facility] [address]       Past Surgical History:   Procedure Laterality Date    CARDIAC SURGERY      CATARACT REMOVAL Bilateral      SECTION      3 times    CORONARY ANGIOPLASTY WITH STENT PLACEMENT      stenting twice    EYE SURGERY      HIATAL HERNIA REPAIR      late     HYSTERECTOMY, TOTAL ABDOMINAL (CERVIX REMOVED)      in     LARYNGOSCOPY N/A 3/22/2022    SUSPENSON LARYNGOSCOPY WITH JET VENT, DILATION performed by Marcelo Jaquez MD at [address] N/A 3/28/2022    SUSPENSION MICROLARYNGOSCOPY WITH BALLOON DILATION AND JET VENT, KENALOG INJECTION performed by Marcelo Jaquez MD at [address] N/A 2022    Suspension Laryngoscopy  Lateral of Right True Vocal Cord, With Steroid Injection of Larynx And Tracheostomy Tube Change, debridement performed by Bharat Walters MD at [address] N/A 8/10/2022    TRANSORAL LARYNGOPLASTY WITH LEFT PARTIAL the cannula out to see how she would do with only a very small amount of the cannula in her airway. She was just about capable of biphasic respiration still producing a good quality speaking voice as well. She had very little audible stridor during this maneuver. Vitals reviewed. No results found. Lab Results   Component Value Date/Time     09/01/2022 07:30 AM     08/22/2022 01:30 PM     08/12/2022 05:20 AM    K 4.6 09/01/2022 07:30 AM    K 4.9 08/22/2022 01:30 PM    K 4.4 08/12/2022 05:20 AM    K 4.3 08/10/2022 08:49 AM    K 4.0 06/08/2022 04:11 AM    K 3.8 06/06/2022 05:20 PM     09/01/2022 07:30 AM     08/22/2022 01:30 PM     08/12/2022 05:20 AM    CO2 23 09/01/2022 07:30 AM    CO2 26 08/22/2022 01:30 PM    CO2 23 08/12/2022 05:20 AM    BUN 20 09/01/2022 07:30 AM    BUN 28 08/22/2022 01:30 PM    BUN 29 08/12/2022 05:20 AM    CREATININE 0.7 09/01/2022 07:30 AM    CREATININE 0.8 08/22/2022 01:30 PM    CREATININE 1.0 08/12/2022 05:20 AM    CALCIUM 9.4 09/01/2022 07:30 AM    CALCIUM 10.1 08/22/2022 01:30 PM    CALCIUM 9.1 08/12/2022 05:20 AM    PROT 6.8 08/22/2022 01:30 PM    PROT 6.1 06/06/2022 05:20 PM    PROT 7.6 03/18/2022 07:22 PM    LABALBU 4.7 08/22/2022 01:30 PM    LABALBU 3.9 06/06/2022 05:20 PM    LABALBU 4.3 03/18/2022 07:22 PM    BILITOT 0.2 08/22/2022 01:30 PM    BILITOT 0.2 06/06/2022 05:20 PM    BILITOT 0.2 03/18/2022 07:22 PM    ALKPHOS 87 08/22/2022 01:30 PM    ALKPHOS 66 06/06/2022 05:20 PM    ALKPHOS 101 03/18/2022 07:22 PM    AST 12 08/22/2022 01:30 PM    AST 17 06/06/2022 05:20 PM    AST 16 03/18/2022 07:22 PM    ALT 7 08/22/2022 01:30 PM    ALT 15 06/06/2022 05:20 PM    ALT 9 03/18/2022 07:22 PM       All of the past medical history, past surgical history, family history,social history, allergies and current medications were reviewed with the patient. Assessment & Plan   Diagnoses and all orders for this visit:     Diagnosis Orders   1. Tracheostomy dependence (HCC)  KY LARYNGOSCOPY,DIRCT,OP SCOP,REMV GARY    KY BRONCHOSCOPY,RX STENOSIS      2. 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 Posterior glottic stenosis  KY LARYNGOSCOPY,DIRCT,OP SCOP,EXC TUMR      4. Stridor  KY LARYNGOSCOPY,DIRCT,OP SCOP,EXC TUMR    KY LARYNGOSCOPY,DIRCT,OP SCOP,REMV GARY      5. Torus mandibularis  KY EXCISION,TORUS MANDIBULARIS          Based on the patient's history and these physical findings, the patient is still very much in need of airway widening at the level of the larynx and peristomal trachea. In addition she has Torus Mandibularis that need to be removed in order to enable safe for and more reliable access to the patient's larynx which are impeded by the room these bony protrusions take-up within the mandibular arch. I explained all this in detail to the patient and her  to their satisfaction. They report being pleased with proceeding with further efforts at removing the tracheostomy and willing to proceed as recommended. I spent over 40 minutes of face-to-face time with the patient the majority of which was dedicated to reviewing her complex history and planning this treatment program.      No follow-ups on file. **This report has been created using voice recognition software. It may contain minor errors which are inherent in voice recognition technology. **

## 2022-11-22 ENCOUNTER — OFFICE VISIT (OUTPATIENT)
Dept: PHYSICAL MEDICINE AND REHAB | Age: 72
End: 2022-11-22
Payer: MEDICARE

## 2022-11-22 ENCOUNTER — TELEPHONE (OUTPATIENT)
Dept: FAMILY MEDICINE CLINIC | Age: 72
End: 2022-11-22

## 2022-11-22 VITALS
BODY MASS INDEX: 36.8 KG/M2 | HEART RATE: 82 BPM | HEIGHT: 62 IN | WEIGHT: 200 LBS | DIASTOLIC BLOOD PRESSURE: 70 MMHG | SYSTOLIC BLOOD PRESSURE: 120 MMHG

## 2022-11-22 DIAGNOSIS — M47.816 SPONDYLOSIS OF LUMBAR REGION WITHOUT MYELOPATHY OR RADICULOPATHY: Primary | ICD-10-CM

## 2022-11-22 DIAGNOSIS — M53.3 SACROILIAC JOINT DYSFUNCTION: ICD-10-CM

## 2022-11-22 DIAGNOSIS — G89.4 CHRONIC PAIN SYNDROME: ICD-10-CM

## 2022-11-22 DIAGNOSIS — M46.1 SI (SACROILIAC) JOINT INFLAMMATION (HCC): ICD-10-CM

## 2022-11-22 DIAGNOSIS — M48.061 SPINAL STENOSIS OF LUMBAR REGION, UNSPECIFIED WHETHER NEUROGENIC CLAUDICATION PRESENT: ICD-10-CM

## 2022-11-22 PROCEDURE — 99205 OFFICE O/P NEW HI 60 MIN: CPT | Performed by: NURSE PRACTITIONER

## 2022-11-22 PROCEDURE — 1036F TOBACCO NON-USER: CPT | Performed by: NURSE PRACTITIONER

## 2022-11-22 PROCEDURE — 3078F DIAST BP <80 MM HG: CPT | Performed by: NURSE PRACTITIONER

## 2022-11-22 PROCEDURE — 3017F COLORECTAL CA SCREEN DOC REV: CPT | Performed by: NURSE PRACTITIONER

## 2022-11-22 PROCEDURE — 1090F PRES/ABSN URINE INCON ASSESS: CPT | Performed by: NURSE PRACTITIONER

## 2022-11-22 PROCEDURE — G8427 DOCREV CUR MEDS BY ELIG CLIN: HCPCS | Performed by: NURSE PRACTITIONER

## 2022-11-22 PROCEDURE — G8417 CALC BMI ABV UP PARAM F/U: HCPCS | Performed by: NURSE PRACTITIONER

## 2022-11-22 PROCEDURE — G8400 PT W/DXA NO RESULTS DOC: HCPCS | Performed by: NURSE PRACTITIONER

## 2022-11-22 PROCEDURE — 1124F ACP DISCUSS-NO DSCNMKR DOCD: CPT | Performed by: NURSE PRACTITIONER

## 2022-11-22 PROCEDURE — 3074F SYST BP LT 130 MM HG: CPT | Performed by: NURSE PRACTITIONER

## 2022-11-22 PROCEDURE — G8484 FLU IMMUNIZE NO ADMIN: HCPCS | Performed by: NURSE PRACTITIONER

## 2022-11-22 RX ORDER — TRAMADOL HYDROCHLORIDE 50 MG/1
50 TABLET ORAL 2 TIMES DAILY PRN
Qty: 28 TABLET | Refills: 0 | Status: SHIPPED | OUTPATIENT
Start: 2022-11-22 | End: 2022-12-06

## 2022-11-22 ASSESSMENT — ENCOUNTER SYMPTOMS
BACK PAIN: 1
VOICE CHANGE: 1

## 2022-11-22 NOTE — PROGRESS NOTES
HPI:     ChiefComplaint: Lumbar back pain    New pt here for Chronic low back pain in wheelchair  at side. She has raspy hoarse voice. She has recent  UTI on CIPRO this past week . She  has had some confusion. She is pending DEXA Scan and Bone fragility medications. 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 She was intubated and ended up with chronic #6 tracheostomy with humidification  with 1-3.5 liters of oxygen. They are monitoring her oxygen levels saturation and they lower the oxygen every other day if able. 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 She has had tracheal dilation  due to stenosis. She has seen Dr Shree Castellanos for low back pain. He ordered back brace said come back in 6 weeks. She has a chronic L3  fracture. She has had low back pain for many years. More severe the past 3 months. She worked at [employer] and mostly [employer]. work her career. She denies any  falls trauma or MVC  that injured her back. She has chronic  axial low back pain. She has back stiffness spasms. The pain  radiates  up from  butt crack to mid thoracic. She has  Right Si  hip  thigh pain at times. 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 She has  numbness tingling in  BLE. She has BLE weakness and mostly just transfers mostly wheelchair bound           Patient pain increases with bending, twisting , walking, standing, sitting, getting up and down, and laying. Treatments tried PT/HEP, ICE/HEAT, NSAIDS, narcotics, muscle relaxer, OTC rubs creams patches, massage or TPI, steroid burst, and TENS Inversion table Chiropractor   Pain description stabbing/jabbing, burning, aching, numbness/tingling, and pins and needles  Pain rating  scale 1-10 highest  9  lowest  2  average   5  Alleviating Factors:lidocaine 4% rice heat bag Tens rest Motrin Zanaflex       Radiology:      Prior Injections:       The patient is allergic to metformin and related, codeine, meperidine hcl, sulfa antibiotics, and sumatriptan. Subjective:      Review of Systems   Constitutional:  Positive for activity change. HENT:  Positive for voice change. Respiratory:          ARF COPD BERNICE  chronic tracheostomy #6 post COVID 2022 wears 1-3.5  oxygen. Cardiovascular:           PICC line  in place LUE   HTN HLD CAD   Endocrine:        DM   Musculoskeletal:  Positive for arthralgias, back pain, gait problem and myalgias. Osteoarthritis    Skin:  Positive for wound. Left ischial  skin shearing pinky nail size. Psychiatric/Behavioral:  The patient is nervous/anxious. Anxiety depression      Objective:     Vitals:    11/22/22 1400   BP: 120/70   Site: Right Lower Arm   Position: Sitting   Cuff Size: Large Adult   Pulse: 82   Weight: 200 lb (90.7 kg)   Height: 5' 2\" (1.575 m)       Physical Exam  Vitals and nursing note reviewed. Constitutional:       General: She is not in acute distress. Appearance: She is well-developed. She is not diaphoretic. HENT:      Head: Normocephalic and atraumatic. Right Ear: External ear normal.      Left Ear: External ear normal.      Nose: Nose normal.      Mouth/Throat:      Pharynx: No oropharyngeal exudate. Eyes:      General: No scleral icterus. Right eye: No discharge. Left eye: No discharge. Conjunctiva/sclera: Conjunctivae normal.      Pupils: Pupils are equal, round, and reactive to light. Neck:      Thyroid: No thyromegaly. Cardiovascular:      Rate and Rhythm: Normal rate and regular rhythm. Heart sounds: Normal heart sounds. No murmur heard. No friction rub. No gallop. Pulmonary:      Effort: Pulmonary effort is normal. No respiratory distress. Breath sounds: Decreased breath sounds present. No wheezing or rales. Comments: #6 trach  1 liter oxygen   Chest:      Chest wall: No tenderness. Abdominal:      General: Bowel sounds are normal. There is no distension. Palpations: Abdomen is soft. Tenderness: There is no abdominal tenderness. There is no guarding or rebound. Musculoskeletal:         General: Tenderness present. Right shoulder: Normal.      Left shoulder: Normal.      Cervical back: Neck supple. Tenderness and bony tenderness present. No edema, erythema or rigidity. No muscular tenderness. Thoracic back: Spasms, tenderness and bony tenderness present. Decreased range of motion. Lumbar back: Spasms, tenderness and bony tenderness present. Decreased range of motion. Positive right straight leg raise test and positive left straight leg raise test.        Back:       Right hip: Tenderness and bony tenderness present. Decreased range of motion. Decreased strength. Left hip: Tenderness and bony tenderness present. Decreased range of motion. Decreased strength. Right knee: Bony tenderness and crepitus present. Decreased range of motion. Tenderness present. Left knee: Bony tenderness and crepitus present. Decreased range of motion. Tenderness present. Skin:     General: Skin is warm. Coloration: Skin is not pale. Findings: No erythema or rash. Neurological:      Mental Status: She is alert and oriented to person, place, and time. She is not disoriented. Cranial Nerves: No cranial nerve deficit. Sensory: No sensory deficit. Motor: No atrophy or abnormal muscle tone. Coordination: Coordination normal.      Gait: Gait normal.      Deep Tendon Reflexes: Reflexes are normal and symmetric. Babinski sign absent on the right side. Psychiatric:         Attention and Perception: She is attentive. Mood and Affect: Mood normal. Mood is not anxious or depressed. Affect is not labile, blunt, angry or inappropriate. Speech: She is communicative. Speech is not rapid and pressured, delayed, slurred or tangential.         Behavior: Behavior normal. Behavior is not agitated, slowed, aggressive, withdrawn, hyperactive or combative. Thought Content: Thought content normal. Thought content is not paranoid or delusional. Thought content does not include homicidal or suicidal ideation. Thought content does not include homicidal or suicidal plan. Cognition and Memory: Memory is not impaired. She does not exhibit impaired recent memory or impaired remote memory. Judgment: Judgment normal. Judgment is not impulsive or inappropriate. NASIR  Patricks test  positive  Yeoman's or Gaenslen's  positive  Kemps  positive  Spurlings  positive  Nogueira'sna         Assessment:     1. Spondylosis of lumbar region without myelopathy or radiculopathy    2. Spinal stenosis of lumbar region, unspecified whether neurogenic claudication present    3. SI (sacroiliac) joint inflammation (HCC)    4. Sacroiliac joint dysfunction    5. Chronic pain syndrome            Plan:      Patient read and signed orientation and opioid agreement if prescribing  OARRS reviewed. Current MED: 10  Patient was not offered naloxone for home. Discussed long term side effects of medications, tolerance, dependency and addiction. UDS possibly preformed today if needed  Patient told can not receive any pain medications from any other source. No evidence of abuse, diversion or aberrant behavior. Medications and/or procedures to improve function and quality of life- patient understanding with this and that may not be pain free  Discussed possible weaning of medication dosing dependent on treatment/procedure results. Discussed with patient about safe storage of medications at home  Testing, Labs or Radiology Reviewed: Lumbar  Procedures: discussed SI  or  Lumbar Facet MBB   Discussed with patient about risks with procedure including infection, reaction to medication, increased pain, or bleeding. Medications:Trial Tramadol BID PRN only may take half tab if wants and may take with Tylenol. F/U [facility] office           Meds.  Prescribed:   Orders Placed This Encounter Medications    traMADol (ULTRAM) 50 MG tablet     Sig: Take 1 tablet by mouth 2 times daily as needed for Pain for up to 14 days. Dispense:  28 tablet     Refill:  0     Reduce doses taken as pain becomes manageable         Return in about 3 months (around 2/22/2023) for [facility] office.          Electronically signed by Elmira Hashimoto, APRN - CNP on 11/22/2022 at 2:51 PM

## 2022-11-22 NOTE — TELEPHONE ENCOUNTER
----- Message from Chelle Muller DO sent at 11/21/2022  8:08 AM EST -----  Please contact patient or  and see how she is feeling since starting the antibiotic.  ----- Message -----  From: Chelle Muller DO  Sent: 11/21/2022  12:00 AM EST  To: Chelle Muller, DO    Check on AMS

## 2022-11-22 NOTE — PROGRESS NOTES
[address]  Dept: [phone]  Dept Fax: [phone]: [phone]    Visit Date: 11/22/2022    Isaiah Richardson is a 73 y.o. female who is referred for pain management evaluation and treatment per Dr. Alejandra Flores. CAGE and CAGE-AID Questions   1. In the last three months, have you felt you should cut down or stop drinking or using drugs? Yes []        No [x]     2. In the last three months, has anyone annoyed you or gotten on your nerves by telling you to cut down or stop drinking or using drugs? Yes []        No [x]     3. In the last three months, have you felt guilty or bad about how much you drink or use drugs? Yes []        No [x]     4. In the last three months, have you been waking up wanting to have an alcoholic drink or use drugs? Yes []        No [x]        Opioid Risk Tool:  Clinician Form       1. Family History of Substance Abuse: Female Male    Alcohol   []1   []3    Illegal drugs   [x]2   []3    Prescription drugs     []4   []4   2. Personal History of Substance Abuse:          Alcohol   []3   []3    Illegal drugs   []4   []4    Prescription drugs     []5   []5   3. Age (juan box if between 12 and 39):     []1   []1   4. History of Preadolescent Sexual Abuse:     []3   []0   5. Psychological Disease:      Attention deficit disorder, obsessive-compulsive disorder, bipolar, schizophrenia   []2   []2      Depression     []1   []1    Scoring Totals 2      Total Score  Low Risk  Moderate Risk  High Risk   Risk Category   0 - 3   4 - 7   8 or Above      Patient states symptoms interfere with:  A.  General Activity:  yes   B. Mood: yes    C. Walking Ability:   yes   D. Normal Work (Includes both work outside the home and housework):   yes    E.  Relations with Other People:  yes   F. Sleep:   yes   G.  Enjoyment of Life:  yes denies pain/discomfort No

## 2022-11-23 ENCOUNTER — TELEPHONE (OUTPATIENT)
Dept: ENT CLINIC | Age: 72
End: 2022-11-23

## 2022-11-23 ENCOUNTER — TELEPHONE (OUTPATIENT)
Dept: FAMILY MEDICINE CLINIC | Age: 72
End: 2022-11-23

## 2022-11-23 DIAGNOSIS — J95.03 TRACHEAL STENOSIS DUE TO TRACHEOSTOMY (HCC): ICD-10-CM

## 2022-11-23 RX ORDER — LEVOFLOXACIN 500 MG/1
500 TABLET, FILM COATED ORAL DAILY
Qty: 7 TABLET | Refills: 0 | Status: SHIPPED | OUTPATIENT
Start: 2022-11-23 | End: 2022-11-30

## 2022-11-23 RX ORDER — PREDNISONE 10 MG/1
15 TABLET ORAL 2 TIMES DAILY
Qty: 90 TABLET | Refills: 0 | Status: SHIPPED | OUTPATIENT
Start: 2022-11-23 | End: 2022-12-23

## 2022-11-23 NOTE — TELEPHONE ENCOUNTER
I spoke with Flip Pacheco after the  called back and said that she needs a refill for 30 mg. Zhanna was sending the Rx in. He said that they use [facility] in [place].

## 2022-11-23 NOTE — TELEPHONE ENCOUNTER
Called and informed patient's , Eloisa Barrera, who is on HIPAA, that Rx was sent into the pharmacy for the patient. Patient verbalized understanding and thanked me.

## 2022-11-23 NOTE — TELEPHONE ENCOUNTER
Patient wife called said she only has five days left of predniSONE (DELTASONE) 10 MG tablet and she needs refills to at least make for a month

## 2022-11-23 NOTE — TELEPHONE ENCOUNTER
Spoke to patient and he thought she was doing better, but today she was back to being angry and accusing him of controlling her. He doesn't believe the antibiotic worked and is requesting something else be sent to the pharmacy today.

## 2022-11-29 ENCOUNTER — TELEPHONE (OUTPATIENT)
Dept: ENT CLINIC | Age: 72
End: 2022-11-29

## 2022-11-29 NOTE — TELEPHONE ENCOUNTER
Patient  is calling to schedule surgery. He states repeatedly that Dr BROUSSARD was going to have this scheduled in February when I advised him that next available would be March. I advised him that Mady Hicks is out but will call him Thursday when she returns. Looks like there are some orders in chart from 11/21 but some are marked complete and one is active. I informed patient  we may have to check with Dr. Rom Alvarenga but would call him Thursday regardless.

## 2022-12-01 NOTE — TELEPHONE ENCOUNTER
Contacted  and scheduled surgery for 2/9/23. Her post op appointment will be 2.5 weeks after surgery due to him being out of the office. If Dr Brian Monreal feels she needs seen sooner after that we'll call them back to reschedule.

## 2022-12-05 ENCOUNTER — TELEPHONE (OUTPATIENT)
Dept: FAMILY MEDICINE CLINIC | Age: 72
End: 2022-12-05

## 2022-12-05 NOTE — TELEPHONE ENCOUNTER
Patients  states that she was seen by her PCP last week and was re-tested for UTI and the UTI was gone but the patient is still showing memory issues  Patients  states that the patient's PCP is Dr Stephanie Elaine in [facility]    Please advise

## 2022-12-12 NOTE — TELEPHONE ENCOUNTER
Anesthesia Pre Eval Note    Anesthesia ROS/Med Hx          Pulmonary Review:    Positive for sleep apnea     Cardiovascular Review:    Positive for CHFPositive for CAD  Positive for valvular problems/murmurs (mild MS) - murmur type AS  Positive for PVD  Positive for hypertension  Positive for hyperlipidemia    GI/HEPATIC/RENAL Review:    Positive for GERD  Positive for renal disease    End/Other Review:  Positive for diabetes  Positive for obesity   Positive for chronic pain      Relevant Problems   Anesthesia Problems   (+) LYNDA (obstructive sleep apnea)       Physical Exam     Airway   Mallampati: II  TM Distance: >3 FB  Neck ROM: Full    Cardiovascular    Patient demonstrates: Murmur    Head Assessment  Head assessment: Normocephalic and Atraumatic    General Assessment  General Assessment: Alert and oriented and No acute distress    Pulmonary Exam  Pulmonary exam normal    Abdominal Exam    Patient Demonstrates:  Obese      Anesthesia Plan:  Anesthesia Plan    ASA Status: 4    Anesthesia Type: General    Induction: Intravenous  Preferred Airway Type: ETT  Maintenance: TIVA and Inhalational  Premedication: None      Post-op Pain Management: Per Surgeon  Postoperative analgesia plan does NOT include opiods    Checklist  Reviewed: Lab Results, EKG, Chest X-Rays, Consultations, Pregnancy Test Results, Patient Summary, Beta Blocker Status, Outside Records, NPO Status, DNR Status, Care Everywhere, Allergies, Medications, Problem list and Past Med History  Monitors  Discussed risks of the following Invasive monitors with patient: Arterial Line and Central Line    Consent/Risks Discussed Statement:  The proposed anesthetic plan, including its risks and benefits, have been discussed with the Patient along with the risks and benefits of alternatives. Questions were encouraged and answered and the patient and/or representative understands and agrees to proceed.    I have discussed elements of the patient's history or  Doug Gallagher recommend reaching out to primary doctor about anti depressant. examination, as noted above and/or as follows, that place the patient at higher risk of complications; age, BMI> 30 (obesity), kidney disease and heart disease.    I discussed with the patient (and/or patient's legal representative) the risks and benefits of the proposed anesthesia plan, General, which may include services performed by other anesthesia providers.    Alternative anesthesia plans, if available, were reviewed with the patient (and/or patient's legal representative). Discussion has been held with the patient (and/or patient's legal representative) regarding risks of anesthesia, which include Nausea, Vomiting, Headache, Hypotension, Dental Injury, Sore Throat, Allergic Reaction, Need for Blood Transfusion, Post-op Intubation, Aspiration, Intra-operative Awareness and ICU Admit and emergent situations that may require change in anesthesia plan.    The patient (and/or patient's legal representative) has indicated understanding, his/her questions have been answered, and he/she wishes to proceed with the planned anesthetic.

## 2022-12-12 NOTE — TELEPHONE ENCOUNTER
Patient's  stated she is doing better, but she is still confused. Her primary doctor put her on an antidepressant and is wondering if that is the cause.  Her vitals and oxygen level are all normal. vitals

## 2022-12-27 ENCOUNTER — TELEPHONE (OUTPATIENT)
Dept: ALLERGY | Age: 72
End: 2022-12-27

## 2022-12-27 RX ORDER — PREDNISONE 10 MG/1
15 TABLET ORAL 2 TIMES DAILY
Qty: 90 TABLET | Refills: 0 | Status: SHIPPED | OUTPATIENT
Start: 2022-12-27 | End: 2023-01-26

## 2022-12-27 NOTE — TELEPHONE ENCOUNTER
Rx sent to pharmacy. Please review this with Dr Matthew Terrazas when he returns to the office next week and see if he would like the patient to continue on 1.5 tablets twice daily or if we should start to decrease to maybe 1 tablet BID.

## 2022-12-27 NOTE — TELEPHONE ENCOUNTER
Patient's spouse, Juan Luis Carrillo, left message on clinical voicemail stating patient will be out of her Prednisone this weekend. He asked to have a refill sent to the pharmacy.

## 2023-01-03 NOTE — TELEPHONE ENCOUNTER
Spoke to Dr Matthew Terrazas. He would like patient to continue to reduce her dosage of the steroid. He wants her to go down each week until she is off completely. Called and spoke to patient's , Dionna Suero. Explained to him about reducing the dose of steroid. He verbally repeated back the directions I gave to him. Dionna Suero also asked if there was any news on moving up surgery. I told patient there is nothing noted regarding that. Told him Maggie would call him if she has any updates. Patient verbalized understanding and thanked me.

## 2023-01-05 ENCOUNTER — TELEPHONE (OUTPATIENT)
Dept: CARDIOLOGY CLINIC | Age: 73
End: 2023-01-05

## 2023-01-05 NOTE — TELEPHONE ENCOUNTER
Patient is PRN with our office as of 7/2022. Patient needing clearance. Does patient need seen or have patient follow up with PCP?

## 2023-01-05 NOTE — TELEPHONE ENCOUNTER
Joseph Waller is scheduled for surgery with Dr Eddie Shafer on 2/9/23. Requesting cardiac clearance. Surgery:    Bilateral Torus Mandibularis excision, Arytenoidectomy, Laryngoscopy and Bronchoscopy with debridement and dilation. Please advise. Thank you!

## 2023-01-16 NOTE — PROGRESS NOTES
Chart reviewed and update received from primary nurse. No new needs noted at this time for Palliative Care. Please call if needed. Transposition Flap Text: The defect edges were debeveled with a #15 scalpel blade.  Given the location of the defect and the proximity to free margins a transposition flap was deemed most appropriate.  Using a sterile surgical marker, an appropriate transposition flap was drawn incorporating the defect.    The area thus outlined was incised deep to adipose tissue with a #15 scalpel blade.  The skin margins were undermined to an appropriate distance in all directions utilizing iris scissors.

## 2023-01-21 ENCOUNTER — HOSPITAL ENCOUNTER (INPATIENT)
Age: 73
LOS: 4 days | Discharge: HOME HEALTH | DRG: 176 | End: 2023-01-29
Attending: FAMILY MEDICINE | Admitting: FAMILY MEDICINE
Payer: MEDICARE

## 2023-01-21 ENCOUNTER — APPOINTMENT (OUTPATIENT)
Dept: GENERAL RADIOLOGY | Age: 73
DRG: 176 | End: 2023-01-21
Payer: MEDICARE

## 2023-01-21 ENCOUNTER — APPOINTMENT (OUTPATIENT)
Dept: CT IMAGING | Age: 73
DRG: 176 | End: 2023-01-21
Payer: MEDICARE

## 2023-01-21 ENCOUNTER — APPOINTMENT (OUTPATIENT)
Dept: INTERVENTIONAL RADIOLOGY/VASCULAR | Age: 73
DRG: 176 | End: 2023-01-21
Payer: MEDICARE

## 2023-01-21 DIAGNOSIS — D64.9 ANEMIA, UNSPECIFIED TYPE: ICD-10-CM

## 2023-01-21 DIAGNOSIS — K02.9 PAIN DUE TO DENTAL CARIES: ICD-10-CM

## 2023-01-21 DIAGNOSIS — I26.99 OTHER ACUTE PULMONARY EMBOLISM WITHOUT ACUTE COR PULMONALE (HCC): ICD-10-CM

## 2023-01-21 DIAGNOSIS — R14.0 ABDOMINAL BLOATING: ICD-10-CM

## 2023-01-21 DIAGNOSIS — R00.0 TACHYCARDIA: ICD-10-CM

## 2023-01-21 DIAGNOSIS — R11.2 NAUSEA AND VOMITING, UNSPECIFIED VOMITING TYPE: Primary | ICD-10-CM

## 2023-01-21 PROBLEM — E78.5 HYPERLIPIDEMIA: Status: ACTIVE | Noted: 2023-01-21

## 2023-01-21 PROBLEM — I95.9 HYPOTENSION: Status: ACTIVE | Noted: 2023-01-21

## 2023-01-21 PROBLEM — R60.0 BILATERAL LEG EDEMA: Status: ACTIVE | Noted: 2023-01-21

## 2023-01-21 PROBLEM — J39.8 TRACHEAL STENOSIS: Status: ACTIVE | Noted: 2022-06-14

## 2023-01-21 PROBLEM — R77.8 ELEVATED TROPONIN: Status: ACTIVE | Noted: 2023-01-21

## 2023-01-21 PROBLEM — I82.403 DEEP VEIN THROMBOSIS (DVT) OF BOTH LOWER EXTREMITIES (HCC): Status: ACTIVE | Noted: 2023-01-21

## 2023-01-21 PROBLEM — R79.89 ELEVATED TROPONIN: Status: ACTIVE | Noted: 2023-01-21

## 2023-01-21 PROBLEM — D72.829 LEUKOCYTOSIS: Status: ACTIVE | Noted: 2023-01-21

## 2023-01-21 LAB
ALBUMIN SERPL BCG-MCNC: 4.3 G/DL (ref 3.5–5.1)
ALP SERPL-CCNC: 84 U/L (ref 38–126)
ALT SERPL W/O P-5'-P-CCNC: 53 U/L (ref 11–66)
ANION GAP SERPL CALC-SCNC: 15 MEQ/L (ref 8–16)
APTT PPP: > 200 SECONDS (ref 22–38)
AST SERPL-CCNC: 34 U/L (ref 5–40)
BACTERIA URNS QL MICRO: ABNORMAL /HPF
BASOPHILS ABSOLUTE: 0.1 THOU/MM3 (ref 0–0.1)
BASOPHILS NFR BLD AUTO: 0.7 %
BILIRUB SERPL-MCNC: 0.4 MG/DL (ref 0.3–1.2)
BILIRUB UR QL STRIP.AUTO: NEGATIVE
BUN SERPL-MCNC: 16 MG/DL (ref 7–22)
CALCIUM SERPL-MCNC: 9 MG/DL (ref 8.5–10.5)
CASTS #/AREA URNS LPF: ABNORMAL /LPF
CASTS 2: ABNORMAL /LPF
CHARACTER UR: CLEAR
CHLORIDE SERPL-SCNC: 101 MEQ/L (ref 98–111)
CO2 SERPL-SCNC: 25 MEQ/L (ref 23–33)
COLOR: YELLOW
CREAT SERPL-MCNC: 0.7 MG/DL (ref 0.4–1.2)
CRYSTALS URNS MICRO: ABNORMAL
D DIMER PPP IA.FEU-MCNC: 5526 NG/ML FEU (ref 0–500)
DEPRECATED RDW RBC AUTO: 56.9 FL (ref 35–45)
DEPRECATED RDW RBC AUTO: 59.2 FL (ref 35–45)
EOSINOPHIL NFR BLD AUTO: 0.7 %
EOSINOPHILS ABSOLUTE: 0.1 THOU/MM3 (ref 0–0.4)
EPITHELIAL CELLS, UA: ABNORMAL /HPF
ERYTHROCYTE [DISTWIDTH] IN BLOOD BY AUTOMATED COUNT: 15.6 % (ref 11.5–14.5)
ERYTHROCYTE [DISTWIDTH] IN BLOOD BY AUTOMATED COUNT: 15.7 % (ref 11.5–14.5)
FLUAV RNA RESP QL NAA+PROBE: NOT DETECTED
FLUBV RNA RESP QL NAA+PROBE: NOT DETECTED
GFR SERPL CREATININE-BSD FRML MDRD: > 60 ML/MIN/1.73M2
GLUCOSE BLD STRIP.AUTO-MCNC: 122 MG/DL (ref 70–108)
GLUCOSE BLD STRIP.AUTO-MCNC: 137 MG/DL (ref 70–108)
GLUCOSE BLD STRIP.AUTO-MCNC: 181 MG/DL (ref 70–108)
GLUCOSE SERPL-MCNC: 103 MG/DL (ref 70–108)
GLUCOSE UR QL STRIP.AUTO: NEGATIVE MG/DL
HCT VFR BLD AUTO: 27.5 % (ref 37–47)
HCT VFR BLD AUTO: 28.7 % (ref 37–47)
HCT VFR BLD AUTO: 32 % (ref 37–47)
HCT VFR BLD AUTO: 36.7 % (ref 37–47)
HEPARIN UNFRACTIONATED: 0.6 U/ML (ref 0.3–0.7)
HEPARIN UNFRACTIONATED: 1.39 U/ML (ref 0.3–0.7)
HGB BLD-MCNC: 12.1 GM/DL (ref 12–16)
HGB BLD-MCNC: 9 GM/DL (ref 12–16)
HGB BLD-MCNC: 9.3 GM/DL (ref 12–16)
HGB BLD-MCNC: 9.9 GM/DL (ref 12–16)
HGB UR QL STRIP.AUTO: NEGATIVE
IMM GRANULOCYTES # BLD AUTO: 0.35 THOU/MM3 (ref 0–0.07)
IMM GRANULOCYTES NFR BLD AUTO: 2.8 %
INR PPP: 1.09 (ref 0.85–1.13)
KETONES UR QL STRIP.AUTO: 40
LACTIC ACID, SEPSIS: 1.9 MMOL/L (ref 0.5–1.9)
LIPASE SERPL-CCNC: 30.4 U/L (ref 5.6–51.3)
LYMPHOCYTES ABSOLUTE: 2.9 THOU/MM3 (ref 1–4.8)
LYMPHOCYTES NFR BLD AUTO: 23.4 %
MCH RBC QN AUTO: 31.8 PG (ref 26–33)
MCH RBC QN AUTO: 32.8 PG (ref 26–33)
MCHC RBC AUTO-ENTMCNC: 30.9 GM/DL (ref 32.2–35.5)
MCHC RBC AUTO-ENTMCNC: 33 GM/DL (ref 32.2–35.5)
MCV RBC AUTO: 102.9 FL (ref 81–99)
MCV RBC AUTO: 99.5 FL (ref 81–99)
MISCELLANEOUS 2: ABNORMAL
MONOCYTES ABSOLUTE: 1.3 THOU/MM3 (ref 0.4–1.3)
MONOCYTES NFR BLD AUTO: 10.6 %
NEUTROPHILS NFR BLD AUTO: 61.8 %
NITRITE UR QL STRIP: NEGATIVE
NRBC BLD AUTO-RTO: 0 /100 WBC
NT-PROBNP SERPL IA-MCNC: 115.7 PG/ML (ref 0–124)
OSMOLALITY SERPL CALC.SUM OF ELEC: 282.7 MOSMOL/KG (ref 275–300)
PH UR STRIP.AUTO: 5.5 [PH] (ref 5–9)
PLATELET # BLD AUTO: 225 THOU/MM3 (ref 130–400)
PLATELET # BLD AUTO: 261 THOU/MM3 (ref 130–400)
PMV BLD AUTO: 10 FL (ref 9.4–12.4)
PMV BLD AUTO: 9.9 FL (ref 9.4–12.4)
POTASSIUM SERPL-SCNC: 3.9 MEQ/L (ref 3.5–5.2)
PROCALCITONIN SERPL IA-MCNC: 0.09 NG/ML (ref 0.01–0.09)
PROT SERPL-MCNC: 6.8 G/DL (ref 6.1–8)
PROT UR STRIP.AUTO-MCNC: NEGATIVE MG/DL
RBC # BLD AUTO: 3.11 MILL/MM3 (ref 4.2–5.4)
RBC # BLD AUTO: 3.69 MILL/MM3 (ref 4.2–5.4)
RBC URINE: ABNORMAL /HPF
RENAL EPI CELLS #/AREA URNS HPF: ABNORMAL /[HPF]
SARS-COV-2 RNA RESP QL NAA+PROBE: NOT DETECTED
SEGMENTED NEUTROPHILS ABSOLUTE COUNT: 7.8 THOU/MM3 (ref 1.8–7.7)
SODIUM SERPL-SCNC: 141 MEQ/L (ref 135–145)
SP GR UR REFRACT.AUTO: 1.02 (ref 1–1.03)
TROPONIN T: 0.02 NG/ML
UROBILINOGEN, URINE: 0.2 EU/DL (ref 0–1)
WBC # BLD AUTO: 10 THOU/MM3 (ref 4.8–10.8)
WBC # BLD AUTO: 12.6 THOU/MM3 (ref 4.8–10.8)
WBC #/AREA URNS HPF: ABNORMAL /HPF
WBC #/AREA URNS HPF: ABNORMAL /[HPF]
YEAST LIKE FUNGI URNS QL MICRO: ABNORMAL

## 2023-01-21 PROCEDURE — 71046 X-RAY EXAM CHEST 2 VIEWS: CPT

## 2023-01-21 PROCEDURE — 85018 HEMOGLOBIN: CPT

## 2023-01-21 PROCEDURE — 93005 ELECTROCARDIOGRAM TRACING: CPT | Performed by: PHYSICIAN ASSISTANT

## 2023-01-21 PROCEDURE — 6370000000 HC RX 637 (ALT 250 FOR IP): Performed by: STUDENT IN AN ORGANIZED HEALTH CARE EDUCATION/TRAINING PROGRAM

## 2023-01-21 PROCEDURE — 74018 RADEX ABDOMEN 1 VIEW: CPT

## 2023-01-21 PROCEDURE — 83880 ASSAY OF NATRIURETIC PEPTIDE: CPT

## 2023-01-21 PROCEDURE — 99285 EMERGENCY DEPT VISIT HI MDM: CPT

## 2023-01-21 PROCEDURE — 99223 1ST HOSP IP/OBS HIGH 75: CPT | Performed by: FAMILY MEDICINE

## 2023-01-21 PROCEDURE — 85379 FIBRIN DEGRADATION QUANT: CPT

## 2023-01-21 PROCEDURE — 6360000002 HC RX W HCPCS: Performed by: STUDENT IN AN ORGANIZED HEALTH CARE EDUCATION/TRAINING PROGRAM

## 2023-01-21 PROCEDURE — 81001 URINALYSIS AUTO W/SCOPE: CPT

## 2023-01-21 PROCEDURE — 71275 CT ANGIOGRAPHY CHEST: CPT

## 2023-01-21 PROCEDURE — 6360000002 HC RX W HCPCS: Performed by: PHYSICIAN ASSISTANT

## 2023-01-21 PROCEDURE — 85025 COMPLETE CBC W/AUTO DIFF WBC: CPT

## 2023-01-21 PROCEDURE — 96376 TX/PRO/DX INJ SAME DRUG ADON: CPT

## 2023-01-21 PROCEDURE — 96375 TX/PRO/DX INJ NEW DRUG ADDON: CPT

## 2023-01-21 PROCEDURE — 85730 THROMBOPLASTIN TIME PARTIAL: CPT

## 2023-01-21 PROCEDURE — 36415 COLL VENOUS BLD VENIPUNCTURE: CPT

## 2023-01-21 PROCEDURE — 6360000004 HC RX CONTRAST MEDICATION: Performed by: PHYSICIAN ASSISTANT

## 2023-01-21 PROCEDURE — 83690 ASSAY OF LIPASE: CPT

## 2023-01-21 PROCEDURE — 96361 HYDRATE IV INFUSION ADD-ON: CPT

## 2023-01-21 PROCEDURE — G0378 HOSPITAL OBSERVATION PER HR: HCPCS

## 2023-01-21 PROCEDURE — 83605 ASSAY OF LACTIC ACID: CPT

## 2023-01-21 PROCEDURE — 87086 URINE CULTURE/COLONY COUNT: CPT

## 2023-01-21 PROCEDURE — 85610 PROTHROMBIN TIME: CPT

## 2023-01-21 PROCEDURE — 87636 SARSCOV2 & INF A&B AMP PRB: CPT

## 2023-01-21 PROCEDURE — 2580000003 HC RX 258: Performed by: PHYSICIAN ASSISTANT

## 2023-01-21 PROCEDURE — 85027 COMPLETE CBC AUTOMATED: CPT

## 2023-01-21 PROCEDURE — 96366 THER/PROPH/DIAG IV INF ADDON: CPT

## 2023-01-21 PROCEDURE — 94760 N-INVAS EAR/PLS OXIMETRY 1: CPT

## 2023-01-21 PROCEDURE — 85014 HEMATOCRIT: CPT

## 2023-01-21 PROCEDURE — 96365 THER/PROPH/DIAG IV INF INIT: CPT

## 2023-01-21 PROCEDURE — 6370000000 HC RX 637 (ALT 250 FOR IP): Performed by: FAMILY MEDICINE

## 2023-01-21 PROCEDURE — 80053 COMPREHEN METABOLIC PANEL: CPT

## 2023-01-21 PROCEDURE — 2700000000 HC OXYGEN THERAPY PER DAY

## 2023-01-21 PROCEDURE — 85520 HEPARIN ASSAY: CPT

## 2023-01-21 PROCEDURE — 6370000000 HC RX 637 (ALT 250 FOR IP): Performed by: PHYSICIAN ASSISTANT

## 2023-01-21 PROCEDURE — 82948 REAGENT STRIP/BLOOD GLUCOSE: CPT

## 2023-01-21 PROCEDURE — 84484 ASSAY OF TROPONIN QUANT: CPT

## 2023-01-21 PROCEDURE — 93970 EXTREMITY STUDY: CPT

## 2023-01-21 PROCEDURE — 84145 PROCALCITONIN (PCT): CPT

## 2023-01-21 PROCEDURE — 96374 THER/PROPH/DIAG INJ IV PUSH: CPT

## 2023-01-21 RX ORDER — LISINOPRIL 10 MG/1
10 TABLET ORAL DAILY
Status: DISCONTINUED | OUTPATIENT
Start: 2023-01-21 | End: 2023-01-26

## 2023-01-21 RX ORDER — METOPROLOL SUCCINATE 25 MG/1
25 TABLET, EXTENDED RELEASE ORAL DAILY
Status: DISCONTINUED | OUTPATIENT
Start: 2023-01-21 | End: 2023-01-25

## 2023-01-21 RX ORDER — ONDANSETRON 2 MG/ML
4 INJECTION INTRAMUSCULAR; INTRAVENOUS ONCE
Status: COMPLETED | OUTPATIENT
Start: 2023-01-21 | End: 2023-01-21

## 2023-01-21 RX ORDER — NITROGLYCERIN 0.4 MG/1
0.4 TABLET SUBLINGUAL EVERY 5 MIN PRN
Status: DISCONTINUED | OUTPATIENT
Start: 2023-01-21 | End: 2023-01-29 | Stop reason: HOSPADM

## 2023-01-21 RX ORDER — ACETAMINOPHEN 650 MG/1
650 SUPPOSITORY RECTAL EVERY 6 HOURS PRN
Status: DISCONTINUED | OUTPATIENT
Start: 2023-01-21 | End: 2023-01-29 | Stop reason: HOSPADM

## 2023-01-21 RX ORDER — ASPIRIN 81 MG/1
81 TABLET, CHEWABLE ORAL DAILY
Status: DISCONTINUED | OUTPATIENT
Start: 2023-01-21 | End: 2023-01-29 | Stop reason: HOSPADM

## 2023-01-21 RX ORDER — HEPARIN SODIUM 1000 [USP'U]/ML
80 INJECTION, SOLUTION INTRAVENOUS; SUBCUTANEOUS PRN
Status: DISCONTINUED | OUTPATIENT
Start: 2023-01-21 | End: 2023-01-24

## 2023-01-21 RX ORDER — POLYETHYLENE GLYCOL 3350 17 G/17G
17 POWDER, FOR SOLUTION ORAL DAILY PRN
Status: DISCONTINUED | OUTPATIENT
Start: 2023-01-21 | End: 2023-01-29 | Stop reason: HOSPADM

## 2023-01-21 RX ORDER — INSULIN GLARGINE 100 [IU]/ML
44 INJECTION, SOLUTION SUBCUTANEOUS NIGHTLY
Status: DISCONTINUED | OUTPATIENT
Start: 2023-01-21 | End: 2023-01-21

## 2023-01-21 RX ORDER — DEXTROSE MONOHYDRATE 100 MG/ML
INJECTION, SOLUTION INTRAVENOUS CONTINUOUS PRN
Status: DISCONTINUED | OUTPATIENT
Start: 2023-01-21 | End: 2023-01-29 | Stop reason: HOSPADM

## 2023-01-21 RX ORDER — ONDANSETRON 2 MG/ML
4 INJECTION INTRAMUSCULAR; INTRAVENOUS EVERY 6 HOURS PRN
Status: DISCONTINUED | OUTPATIENT
Start: 2023-01-21 | End: 2023-01-29 | Stop reason: HOSPADM

## 2023-01-21 RX ORDER — SODIUM CHLORIDE 9 MG/ML
INJECTION, SOLUTION INTRAVENOUS PRN
Status: DISCONTINUED | OUTPATIENT
Start: 2023-01-21 | End: 2023-01-25

## 2023-01-21 RX ORDER — ACETAMINOPHEN 325 MG/1
650 TABLET ORAL ONCE
Status: COMPLETED | OUTPATIENT
Start: 2023-01-21 | End: 2023-01-21

## 2023-01-21 RX ORDER — SODIUM CHLORIDE 0.9 % (FLUSH) 0.9 %
5-40 SYRINGE (ML) INJECTION EVERY 12 HOURS SCHEDULED
Status: DISCONTINUED | OUTPATIENT
Start: 2023-01-21 | End: 2023-01-25

## 2023-01-21 RX ORDER — SIMETHICONE 80 MG
80 TABLET,CHEWABLE ORAL EVERY 6 HOURS PRN
Status: DISCONTINUED | OUTPATIENT
Start: 2023-01-21 | End: 2023-01-21

## 2023-01-21 RX ORDER — FUROSEMIDE 40 MG/1
40 TABLET ORAL DAILY
Status: DISCONTINUED | OUTPATIENT
Start: 2023-01-21 | End: 2023-01-29 | Stop reason: HOSPADM

## 2023-01-21 RX ORDER — BUMETANIDE 0.25 MG/ML
1 INJECTION, SOLUTION INTRAMUSCULAR; INTRAVENOUS ONCE
Status: DISCONTINUED | OUTPATIENT
Start: 2023-01-21 | End: 2023-01-21

## 2023-01-21 RX ORDER — ROSUVASTATIN CALCIUM 10 MG/1
10 TABLET, COATED ORAL DAILY
Status: DISCONTINUED | OUTPATIENT
Start: 2023-01-21 | End: 2023-01-29 | Stop reason: HOSPADM

## 2023-01-21 RX ORDER — LORAZEPAM 2 MG/ML
1 INJECTION INTRAMUSCULAR ONCE
Status: COMPLETED | OUTPATIENT
Start: 2023-01-21 | End: 2023-01-21

## 2023-01-21 RX ORDER — INSULIN GLARGINE 100 [IU]/ML
22 INJECTION, SOLUTION SUBCUTANEOUS NIGHTLY
Status: DISCONTINUED | OUTPATIENT
Start: 2023-01-21 | End: 2023-01-29 | Stop reason: HOSPADM

## 2023-01-21 RX ORDER — INSULIN LISPRO 100 [IU]/ML
0-4 INJECTION, SOLUTION INTRAVENOUS; SUBCUTANEOUS NIGHTLY
Status: DISCONTINUED | OUTPATIENT
Start: 2023-01-21 | End: 2023-01-26

## 2023-01-21 RX ORDER — ALBUTEROL SULFATE 90 UG/1
2 AEROSOL, METERED RESPIRATORY (INHALATION) EVERY 6 HOURS PRN
Status: DISCONTINUED | OUTPATIENT
Start: 2023-01-21 | End: 2023-01-29 | Stop reason: HOSPADM

## 2023-01-21 RX ORDER — HEPARIN SODIUM 1000 [USP'U]/ML
80 INJECTION, SOLUTION INTRAVENOUS; SUBCUTANEOUS ONCE
Status: COMPLETED | OUTPATIENT
Start: 2023-01-21 | End: 2023-01-21

## 2023-01-21 RX ORDER — HEPARIN SODIUM 10000 [USP'U]/100ML
5-30 INJECTION, SOLUTION INTRAVENOUS CONTINUOUS
Status: DISCONTINUED | OUTPATIENT
Start: 2023-01-21 | End: 2023-01-24

## 2023-01-21 RX ORDER — SODIUM CHLORIDE 0.9 % (FLUSH) 0.9 %
5-40 SYRINGE (ML) INJECTION PRN
Status: DISCONTINUED | OUTPATIENT
Start: 2023-01-21 | End: 2023-01-25

## 2023-01-21 RX ORDER — ALBUTEROL SULFATE 2.5 MG/3ML
2.5 SOLUTION RESPIRATORY (INHALATION) EVERY 6 HOURS PRN
Status: DISCONTINUED | OUTPATIENT
Start: 2023-01-21 | End: 2023-01-25

## 2023-01-21 RX ORDER — SIMETHICONE 80 MG
80 TABLET,CHEWABLE ORAL 4 TIMES DAILY
Status: DISCONTINUED | OUTPATIENT
Start: 2023-01-21 | End: 2023-01-29 | Stop reason: HOSPADM

## 2023-01-21 RX ORDER — HEPARIN SODIUM 1000 [USP'U]/ML
40 INJECTION, SOLUTION INTRAVENOUS; SUBCUTANEOUS PRN
Status: DISCONTINUED | OUTPATIENT
Start: 2023-01-21 | End: 2023-01-24

## 2023-01-21 RX ORDER — CYCLOBENZAPRINE HCL 10 MG
10 TABLET ORAL 3 TIMES DAILY
Status: DISCONTINUED | OUTPATIENT
Start: 2023-01-21 | End: 2023-01-21

## 2023-01-21 RX ORDER — INSULIN LISPRO 100 [IU]/ML
0-4 INJECTION, SOLUTION INTRAVENOUS; SUBCUTANEOUS
Status: DISCONTINUED | OUTPATIENT
Start: 2023-01-21 | End: 2023-01-26

## 2023-01-21 RX ORDER — ONDANSETRON 4 MG/1
4 TABLET, ORALLY DISINTEGRATING ORAL EVERY 8 HOURS PRN
Status: DISCONTINUED | OUTPATIENT
Start: 2023-01-21 | End: 2023-01-29 | Stop reason: HOSPADM

## 2023-01-21 RX ORDER — METOCLOPRAMIDE HYDROCHLORIDE 5 MG/ML
10 INJECTION INTRAMUSCULAR; INTRAVENOUS ONCE
Status: COMPLETED | OUTPATIENT
Start: 2023-01-21 | End: 2023-01-21

## 2023-01-21 RX ORDER — 0.9 % SODIUM CHLORIDE 0.9 %
500 INTRAVENOUS SOLUTION INTRAVENOUS ONCE
Status: COMPLETED | OUTPATIENT
Start: 2023-01-21 | End: 2023-01-21

## 2023-01-21 RX ORDER — PREDNISONE 10 MG/1
15 TABLET ORAL 2 TIMES DAILY
Status: DISCONTINUED | OUTPATIENT
Start: 2023-01-21 | End: 2023-01-21

## 2023-01-21 RX ORDER — ACETAMINOPHEN 325 MG/1
650 TABLET ORAL EVERY 6 HOURS PRN
Status: DISCONTINUED | OUTPATIENT
Start: 2023-01-21 | End: 2023-01-29 | Stop reason: HOSPADM

## 2023-01-21 RX ORDER — INSULIN GLARGINE 100 [IU]/ML
30 INJECTION, SOLUTION SUBCUTANEOUS NIGHTLY
Status: ON HOLD | COMMUNITY
End: 2023-01-29 | Stop reason: SDUPTHER

## 2023-01-21 RX ADMIN — ALTEPLASE 2 MG: 2.2 INJECTION, POWDER, LYOPHILIZED, FOR SOLUTION INTRAVENOUS at 08:40

## 2023-01-21 RX ADMIN — SODIUM CHLORIDE 500 ML: 9 INJECTION, SOLUTION INTRAVENOUS at 07:46

## 2023-01-21 RX ADMIN — ONDANSETRON 4 MG: 2 INJECTION INTRAMUSCULAR; INTRAVENOUS at 04:00

## 2023-01-21 RX ADMIN — IOPAMIDOL 80 ML: 755 INJECTION, SOLUTION INTRAVENOUS at 09:20

## 2023-01-21 RX ADMIN — HEPARIN SODIUM 7260 UNITS: 1000 INJECTION, SOLUTION INTRAVENOUS; SUBCUTANEOUS at 10:42

## 2023-01-21 RX ADMIN — METOCLOPRAMIDE 10 MG: 5 INJECTION, SOLUTION INTRAMUSCULAR; INTRAVENOUS at 10:42

## 2023-01-21 RX ADMIN — LORAZEPAM 1 MG: 2 INJECTION INTRAMUSCULAR; INTRAVENOUS at 10:41

## 2023-01-21 RX ADMIN — ROSUVASTATIN 10 MG: 10 TABLET, FILM COATED ORAL at 19:39

## 2023-01-21 RX ADMIN — CYCLOBENZAPRINE 10 MG: 10 TABLET, FILM COATED ORAL at 15:50

## 2023-01-21 RX ADMIN — ONDANSETRON 4 MG: 2 INJECTION INTRAMUSCULAR; INTRAVENOUS at 20:13

## 2023-01-21 RX ADMIN — SIMETHICONE 80 MG: 80 TABLET, CHEWABLE ORAL at 15:50

## 2023-01-21 RX ADMIN — HEPARIN SODIUM 18 UNITS/KG/HR: 10000 INJECTION, SOLUTION INTRAVENOUS at 10:47

## 2023-01-21 RX ADMIN — ASPIRIN 81 MG 81 MG: 81 TABLET ORAL at 13:18

## 2023-01-21 RX ADMIN — ACETAMINOPHEN 650 MG: 325 TABLET ORAL at 03:32

## 2023-01-21 RX ADMIN — SIMETHICONE 80 MG: 80 TABLET, CHEWABLE ORAL at 19:42

## 2023-01-21 RX ADMIN — INSULIN GLARGINE 22 UNITS: 100 INJECTION, SOLUTION SUBCUTANEOUS at 23:12

## 2023-01-21 RX ADMIN — ACETAMINOPHEN 650 MG: 325 TABLET ORAL at 19:39

## 2023-01-21 RX ADMIN — Medication: at 03:33

## 2023-01-21 ASSESSMENT — PAIN SCALES - GENERAL: PAINLEVEL_OUTOF10: 3

## 2023-01-21 ASSESSMENT — PAIN - FUNCTIONAL ASSESSMENT
PAIN_FUNCTIONAL_ASSESSMENT: NONE - DENIES PAIN
PAIN_FUNCTIONAL_ASSESSMENT: 0-10
PAIN_FUNCTIONAL_ASSESSMENT: NONE - DENIES PAIN
PAIN_FUNCTIONAL_ASSESSMENT: NONE - DENIES PAIN
PAIN_FUNCTIONAL_ASSESSMENT: ACTIVITIES ARE NOT PREVENTED
PAIN_FUNCTIONAL_ASSESSMENT: NONE - DENIES PAIN

## 2023-01-21 ASSESSMENT — PAIN DESCRIPTION - LOCATION
LOCATION: ABDOMEN
LOCATION: BACK

## 2023-01-21 ASSESSMENT — PAIN DESCRIPTION - ORIENTATION: ORIENTATION: LOWER

## 2023-01-21 ASSESSMENT — PAIN DESCRIPTION - ONSET: ONSET: ON-GOING

## 2023-01-21 ASSESSMENT — PAIN DESCRIPTION - DESCRIPTORS
DESCRIPTORS: DULL;ACHING
DESCRIPTORS: ACHING

## 2023-01-21 ASSESSMENT — PAIN DESCRIPTION - FREQUENCY: FREQUENCY: CONTINUOUS

## 2023-01-21 ASSESSMENT — PAIN DESCRIPTION - PAIN TYPE: TYPE: CHRONIC PAIN

## 2023-01-21 NOTE — ED NOTES
Pt transported to [facility]. Floor was contacted prior to transport and spoke with [facility].      David Claros  01/21/23 1050

## 2023-01-21 NOTE — H&P
Hospitalist - History & Physical      Patient: Scarlett Lowe    Unit/Bed:8B-34/034-A  YOB: 1950  MRN: [id number]   Acct: [de-identified]   PCP: Brittanie Colón DO    Date of Service: Pt seen/examined on 01/21/23  and Admitted to Observation with expected LOS less than two midnights due to medical therapy. Chief Complaint:  abdominal bloating      History Of Present Illness:    82 yo F with history of DM, HTN, COVID 12/2020 s/p tracheostomy 04/2022 follows with Dr. Daron Payne, presented to [facility] ED for complaints of abdominal bloating with nausea/vomiting. Ed: afebrile, hemodynamically stable tachycardic, saturating well on 0.5 L trach mask. Labs notable for Trop 0.021 then 0.019 on repeat, leukocytosis 12.6. Ddimer elevation ~5000. CTA chest: Filling defects in the right lower lobe pulmonary arterial branches consistent with acute pulmonary emboli. No evidence of right ventricular strain. CXR WNL. KUB with no acute process. US Duplex + for distal DVT Areas of abnormal echogenicity in the right posterior tibial vein, left greater saphenous, left peroneal and left anterior tibial veins suspicious for thrombi. Started on heparin gtt. Given reglan ang GI cocktal. Had a BM. Admitted for further management. On eval, per patient and her  present at bedside, reports has been having issues with constipation and bloating last couple of days which got worse. Woke up with worsened abdominal bloating/distention with nausea and vomiting. Had a large BM overnight, feels slightly better since then. No subjective fever, chills, dizziness, diarrhea. Dyspnea was in the setting of weaning down her oxygen needs following a weaning protcol. No chest pain, palpitations, syncope. Follows with Dr. Daron Payne. Plan for weaning off trach with a procedure on 02/09/2023.       Past Medical History:        Diagnosis Date    Arthritis     Coronary artery disease     COVID     Hyperlipidemia     Primary hypertension Type 2 diabetes mellitus ([facility] [address] 2018       Past Surgical History:      Procedure Laterality Date    CARDIAC SURGERY      CATARACT REMOVAL Bilateral      SECTION      3 times    CORONARY ANGIOPLASTY WITH STENT PLACEMENT      stenting twice    EYE SURGERY      HIATAL HERNIA REPAIR      late     HYSTERECTOMY, TOTAL ABDOMINAL (CERVIX REMOVED)      in     LARYNGOSCOPY N/A 3/22/2022    SUSPENSON LARYNGOSCOPY WITH JET VENT, DILATION performed by Sidra Ryan MD at [address]  N/A 3/28/2022    SUSPENSION MICROLARYNGOSCOPY WITH BALLOON DILATION AND JET VENT, KENALOG INJECTION performed by Sidra Ryan MD at [address] N/A 2022    Suspension Laryngoscopy  Lateral of Right True Vocal Cord, With Steroid Injection of Larynx And Tracheostomy Tube Change, debridement performed by Mamie Figueroa MD at [address]  N/A 8/10/2022    TRANSORAL LARYNGOPLASTY WITH LEFT PARTIAL ARYTENODECTOMY AND POSS LATERALIZATION, ADVANCEMENT FLAP RECONSTRUCTION WITH JET VENT,THERAPUTIC BRONCHOSCOPY WITH DEBRIEDMENT,WITH BALLOON DILITATION performed by Mamie Figueroa MD at [address]  N/A 2022    Laryngoscopy with Dilation Debridement  Bronchoscopy with Dilation & Resection of Obstructing Tissues Transoral Laryngoplasty Left True Vocal Fold Lateralization left partial aryteniodectomy performed by Mamie Figueroa MD at [address]      in [address] 2022    TRACHEOTOMY TUBE REPLACEMENT performed by Sidra Ryan MD at [facility] Medications:   No current facility-administered medications on file prior to encounter.      Current Outpatient Medications on File Prior to Encounter   Medication Sig Dispense Refill    predniSONE (DELTASONE) 10 MG tablet Take 1.5 tablets by mouth 2 times daily 90 tablet 0    tiZANidine (ZANAFLEX) 4 MG tablet       Benzocaine-Menthol (CEPACOL) 15-2.3 MG LOZG Take 1 lozenge by mouth every 3 hours as needed (sore throat, pain) 16 lozenge 1    Insulin Aspart (NOVOLOG FLEXPEN SC) Inject into the skin      Fexofenadine HCl (MUCINEX ALLERGY PO) Take 1 tablet by mouth 2 times daily Cuts in half to swallow easier      LANTUS SOLOSTAR 100 UNIT/ML injection pen Inject 50 Units into the skin nightly (Patient taking differently: Inject 44 Units into the skin nightly Family MD wants am blood sugar to be between 100-120) 15 mL 0    insulin aspart (NOVOLOG FLEXPEN) 100 UNIT/ML injection pen Inject 20-25 Units into the skin in the morning and 20-25 Units at noon and 20-25 Units in the evening. Inject before meals. (Patient taking differently: Inject 10 Units into the skin 2 times daily (before meals) 10 units with sliding scale at lunch and dinner) 22.5 mL 0    glucagon 1 MG injection Inject 1 mg into the muscle See Admin Instructions Follow package directions for low blood sugar. 1 kit 3    Insulin Pen Needle 31G X 6 MM MISC 1 each by Does not apply route daily 100 each 3    [DISCONTINUED] insulin lispro, 1 Unit Dial, (HUMALOG KWIKPEN) 100 UNIT/ML SOPN Inject 20-25 Units into the skin in the morning and 20-25 Units at noon and 20-25 Units in the evening. Inject before meals. 22.5 mL 0    albuterol (ACCUNEB) 0.63 MG/3ML nebulizer solution Take 1 ampule by nebulization every 6 hours as needed for Wheezing      lidocaine (XYLOCAINE) 4 % external solution Apply 1 Dose topically as needed for Pain Apply topically as needed.       Cholecalciferol (VITAMIN D3 PO) Take by mouth daily 4000 units      NONFORMULARY daily resveratrol      lisinopril (PRINIVIL;ZESTRIL) 10 MG tablet Take 1 tablet by mouth daily 30 tablet 3    guaiFENesin 400 MG tablet Take 400 mg by mouth 2 times daily as needed for Cough      [DISCONTINUED] insulin NPH (HUMULIN N;NOVOLIN N) 100 UNIT/ML injection vial Inject into the skin 2 times daily (before meals) Takes BID on the SS      metoprolol succinate (TOPROL XL) 25 MG extended release tablet Take 1 tablet by mouth daily 30 tablet 3    albuterol sulfate  (90 Base) MCG/ACT inhaler Inhale 2 puffs into the lungs every 6 hours as needed for Wheezing 18 g 3    furosemide (LASIX) 40 MG tablet Take 1 tablet by mouth daily 60 tablet 3    OXYGEN Inhale 2 L/min into the lungs continuous prn (during activities such as walking) 1 Canister 5    rosuvastatin (CRESTOR) 10 MG tablet Take 10 mg by mouth daily      nitroGLYCERIN (NITROSTAT) 0.4 MG SL tablet Place 0.4 mg under the tongue every 5 minutes as needed for Chest pain up to max of 3 total doses. If no relief after 1 dose, call 911.       aspirin 81 MG chewable tablet Take 81 mg by mouth daily         Allergies:    Metformin and related, Codeine, Meperidine hcl, Sulfa antibiotics, and Sumatriptan    Social History:    reports that she has never smoked. She has never used smokeless tobacco. She reports that she does not currently use alcohol. She reports that she does not use drugs. Family History:       Problem Relation Age of Onset    Parkinson's Disease Father     Lung Cancer Father        Review of systems:   Pertinent positives as noted in the HPI. All other systems reviewed and negative. PHYSICAL EXAM:  /80   Pulse (!) 107   Temp 99.6 °F (37.6 °C) (Oral)   Resp 13   Wt 200 lb (90.7 kg)   LMP  (LMP Unknown)   SpO2 98%   BMI 36.58 kg/m²   General appearance: No apparent distress, appears stated age and cooperative. HEENT: Normal cephalic, atraumatic without obvious deformity. Extra ocular muscles intact. Conjunctivae/corneas clear. Neck: Supple, with full range of motion. No jugular venous distention. Trach mask in place, no surrounding skin changes, no drainage or bleeding. Respiratory:  Normal respiratory effort. Diminished lung sounds but no rales anteriorly bilaterally without Rales/Wheezes/Rhonchi. Limited exam   Cardiovascular: Regular rate and rhythm with normal S1/S2 without murmurs, rubs or gallops.   Abdomen: Soft, mildly tender, non-distended with normal bowel sounds. No peritoneal signs  Musculoskeletal:  No clubbing, cyanosis bilaterally. 2+ pitting edema in BLE   Skin: Skin color, texture, turgor normal.  No rashes or lesions. Neurologic:  Neurovascularly intact without any focal sensory/motor deficits. Cranial nerves: II-XII intact, grossly non-focal.  Psychiatric: Alert and oriented, thought content appropriate, normal insight  Capillary Refill: Brisk,< 3 seconds   Peripheral Pulses: +2 palpable, equal bilaterally     Labs:   Recent Labs     01/21/23 0415   WBC 12.6*   HGB 12.1   HCT 36.7*        Recent Labs     01/21/23 0415      K 3.9      CO2 25   BUN 16   CREATININE 0.7   CALCIUM 9.0     Recent Labs     01/21/23 0415   AST 34   ALT 53   BILITOT 0.4   ALKPHOS 84     No results for input(s): INR in the last 72 hours. No results for input(s): Elizabeth Coombes in the last 72 hours. Urinalysis:    Lab Results   Component Value Date/Time    NITRU NEGATIVE 01/21/2023 09:50 AM    WBCUA 10-15 01/21/2023 09:50 AM    BACTERIA NONE SEEN 01/21/2023 09:50 AM    RBCUA 0-2 01/21/2023 09:50 AM    BLOODU NEGATIVE 01/21/2023 09:50 AM    SPECGRAV 1.013 08/12/2022 12:30 PM    GLUCOSEU NEGATIVE 01/21/2023 09:50 AM       Radiology:   VL DUP LOWER EXTREMITY VENOUS BILATERAL   Final Result      1. Areas of abnormal echogenicity in the right posterior tibial vein, left greater saphenous, left peroneal and left anterior tibial veins suspicious for thrombi. **This report has been created using voice recognition software. It may contain minor errors which are inherent in voice recognition technology. **      Final report electronically signed by DR Leann Decker on 1/21/2023 11:28 AM      CTA CHEST W WO CONTRAST   Final Result       1. Filling defects in the right lower lobe pulmonary arterial branches consistent with acute pulmonary emboli. No evidence of right ventricular strain.    2.. Atherosclerotic calcification in the thoracic aorta. 3. Borderline cardiomegaly. Calcification versus stent placement in the left anterior descending coronary artery. 4. Thickening off the interstitial lung markings. 5. Thoracic spondylosis. 6. Sclerotic density in the left eighth rib posteriorly. 7. Small hiatal hernia. 9. Results called to Flash Nava   at 950 AM               **This report has been created using voice recognition software. It may contain minor errors which are inherent in voice recognition technology. **      Final report electronically signed by DR Sandy Duff on 1/21/2023 9:57 AM      XR CHEST (2 VW)   Final Result   Impression:   No acute cardiopulmonary. This document has been electronically signed by: Deandra Hathaway on    01/21/2023 04:56 AM      XR ABDOMEN (KUB) (SINGLE AP VIEW)   Final Result   Impression: No acute process. This document has been electronically signed by: Deandra Moran. [address] [place] [address] on    01/21/2023 04:58 AM        XR CHEST (2 VW)    Result Date: 1/21/2023  Portable semierect AP upright and lateral chest views. Comparison: CTA chest 4/13/2022. 3/27/2022 2 view chest. Findings: Cardiac silhouette projects enlarged part of which is due to prominent mediastinal fat on comparison CTA chest. No pulmonary venous congestion. No consolidation, pneumothorax, or defined pleural effusion. Left upper extremity PICC line terminates at the superior vena cava. Proximal tracheostomy tube. No acute bony abnormality. Impression: No acute cardiopulmonary. This document has been electronically signed by: Deandra Hathaway on 01/21/2023 04:56 AM    XR ABDOMEN (KUB) (SINGLE AP VIEW)    Result Date: 1/21/2023  Supine AP abdomen. Comparison: 1/10/2022 CT abdomen pelvis with contrast. Findings: Mild gaseous intestinal tract. No bowel obstruction. No free air. No apparent organomegaly. Properitoneal flank stripes are visualized.  Psoas shadows are partially obscured due to technical factors. Chronic degenerative changes in the spine with levocurvature. Impression: No acute process. This document has been electronically signed by: Robert Morris. Renny Wright on 01/21/2023 04:58 AM    CTA CHEST W WO CONTRAST    Result Date: 1/21/2023  PROCEDURE: CTA CHEST W WO CONTRAST CLINICAL INFORMATION: PE study Elevated D-dimer  tachycardia. COMPARISON: CTA chest dated 6 June 2022. . TECHNIQUE: 3 mm axial images were obtained through the chest after the administration of IV contrast.  A non-contrast localizer was obtained. 3D reconstructions were performed on the scanner to include MIP coronal and sagittal images through the chest. Isovue was the intravenous contrast utilized. All CT scans at this facility use dose modulation, iterative reconstruction, and/or weight-based dosing when appropriate to reduce radiation dose to as low as reasonably achievable. FINDINGS: There are filling defects in the right lower lobe pulmonary arterial branches consistent with pulmonary emboli. The left pulmonary artery appears be normal. There is mild dilatation of the main pulmonary artery. There is a tracheotomy tube in place. .  There is atherosclerotic calcification in the aortic arch. . There is borderline cardiomegaly. There is no evidence for right ventricular strain. There is increased attenuation in the left anterior descending coronary artery which could represent calcification versus stent placement. There is no pericardial or pleural effusion. There is no mediastinal, axillary or hilar adenopathy. There is thickening off the interstitial lung markings. . There are no infiltrates. There is mild thoracic spondylosis. . There is a sclerotic density in the left eighth rib posteriorly. There is a small hiatal hernia. .      1. Filling defects in the right lower lobe pulmonary arterial branches consistent with acute pulmonary emboli. No evidence of right ventricular strain.  2.. Atherosclerotic calcification in the thoracic aorta. 3. Borderline cardiomegaly. Calcification versus stent placement in the left anterior descending coronary artery. 4. Thickening off the interstitial lung markings. 5. Thoracic spondylosis. 6. Sclerotic density in the left eighth rib posteriorly. 7. Small hiatal hernia. 9. Results called to Ricardo Keith   at 950 AM **This report has been created using voice recognition software. It may contain minor errors which are inherent in voice recognition technology. ** Final report electronically signed by DR Long Mariscal on 1/21/2023 9:57 AM    VL DUP LOWER EXTREMITY VENOUS BILATERAL    Result Date: 1/21/2023  PROCEDURE: VL DUP LOWER EXTREMITY VENOUS BILATERAL CLINICAL INFORMATION: Bilateral lower extremity edema. COMPARISON: No prior study. TECHNIQUE: Venous doppler ultrasound was performed of the bilateral lower extremities using gray scale, color flow and spectral doppler imaging. FINDINGS: There is normal color flow, spectral analysis and compressibility of the right and left common femoral vein, femoral vein and popliteal veins. There is abnormal echogenicity in the right posterior tibial vein consistent with thrombus. There is abnormal echogenicity in the left greater saphenous, peroneal and possibly left anterior tibial veins, suspicious for thrombus. . There is normal color flow and compressibility in the left posterior tibial veins, right anterior tibial veins and right peroneal veins. 1. Areas of abnormal echogenicity in the right posterior tibial vein, left greater saphenous, left peroneal and left anterior tibial veins suspicious for thrombi. **This report has been created using voice recognition software. It may contain minor errors which are inherent in voice recognition technology. ** Final report electronically signed by DR Long Mariscal on 1/21/2023 11:28 AM        Assessment and Plan:  Acute Pulmonary Embolism/DVT: CTA chest: Filling defects in the right lower lobe pulmonary arterial branches consistent with acute pulmonary emboli. No evidence of right ventricular strain. US Duplex + for DVT Areas of abnormal echogenicity in the right posterior tibial vein, left greater saphenous, left peroneal and left anterior tibial veins suspicious for thrombi. Likely due to reduced mobility, walks few feet at home but since COVID, less active. No prior hx. No recent travels or surgeries. Currently asymptomatic. On heparin gtt. Transition to [address] tomorrow prior to discharge. Will need to follow up with Dr. Brian Monreal for possible postponement of procedure in the setting of PE on [address]. Abdominal bloating: unclear etiology. With nausea and vomiting. Currently symptoms have improved. Mild abdominal discomfort and bloating. Afebrile, hemodynamically stable. No leukocytosis. KUB WNL. Will monitor. Start on Flexeril and Simethicone for cramping/bloating. Zofran as needed for nausea. No indications for abx therapy. BLE edema: acute-subacute onset, per  both legs have been \"huge and swollen\" over the last few days. Started on Lasix 40 mg daily per PCP that has helped. No prior hx HF. Not on diuretics otherwise. Elevation and massage helped. Was told, could be lymphedema. Will given Bumex 1 mg IV x 1 dose. Hold home lasix. Monitor UOP. Wrap and elevate legs. PTOT. Primary HTN: controlled, on Lisinopril and recently added Lasix. Resume Lisinopril with holding parameters. CAD/HLD: on prior stenting. On Lipitor and ASA. IDDMII: HbA1c 8.8 on 11/17/22. On Lantus 44u nightly with Humalog at home. Will repeat A1c. Resume home Lantus with SSI. Monitor BG with inpatient goal 140-180.      Hx COVID s/p tracheostomy: 12/2021-04/2022    Code Status: FULL    Tele:   [x] yes             [] no    Fluids:  Diet: Diet NPO    DVT prophylaxis: [] Lovenox                                 [] SCDs                                 [] SQ Heparin                                 [] Encourage ambulation [x] Already on Anticoagulation - heparin gtt      Disposition:      [x] TBD                             [x] Home                             [] TCU                             [] Rehab                             [] Psych                             [] SNF                             [] [place]                             [] Other-      Electronically signed by   Negro Wang MD   PGY3, Internal Medicine  1/21/2023

## 2023-01-21 NOTE — ED TRIAGE NOTES
PT to the ED for Flu Like symptoms . PT states she also feels bloated. PT  states she has been having a head ache and some nausea. But no emesis. Respirations are even and unlabored.

## 2023-01-21 NOTE — ED NOTES
ED to inpatient nurses report    Chief Complaint   Patient presents with    Nausea      Present to ED from home  LOC: alert and orientated to name, place, date  Vital signs   Vitals:    01/21/23 0748 01/21/23 0845 01/21/23 0959 01/21/23 1049   BP: 108/63 105/63  108/80   Pulse: (!) 110 (!) 103  (!) 107   Resp: 18 22 13   Temp:       TempSrc:       SpO2: 98% 100%  98%   Weight:   200 lb (90.7 kg)       Oxygen Baseline 0.5 L O2 on trach mask    Current needs required monitor infusions  LDAs:   Peripheral IV 01/21/23 Right Forearm (Active)   Site Assessment Clean, dry & intact 01/21/23 0801   Line Status Flushed;Normal saline locked; Blood return noted;Sluggish blood return 01/21/23 0801   Phlebitis Assessment No symptoms 01/21/23 0801   Infiltration Assessment 0 01/21/23 0801     Mobility: Requires assistance * 2  Pending ED orders: none   Present condition: stable    C-SSRS Risk of Suicide: No Risk  Swallow Screening    Preferred Language: English     Electronically signed by Edwina Johnson RN on 1/21/2023 at 10:54 AM       Edwina Johnson RN  01/21/23 9291

## 2023-01-21 NOTE — ED NOTES
Pt is laying in bed at this time. Urine needs to be collected and pt states she has to urinate and is placed on external catheter. Pt right ventral wrist is occluded and new line is placed. Brunilda eSe is updated that PICC line will not flush. Will continue to monitor pt at this time.       Fly Aden RN  01/21/23 0459

## 2023-01-21 NOTE — ED NOTES
PT is straight cathed and a small amount of urine is collected. Pt PICC line now is able to flush and blood return is noted. Call light in reach and pt is updated on POC at this time.  at bedside.       Elsy Cobian RN  01/21/23 7313

## 2023-01-21 NOTE — PLAN OF CARE
Hospitalist -- notified pt's BP 90/60 this afternoon - 's.  Had not received the bumex ordered for BLE edema, did receive flexeril.  Had not received her normal lisinopril, metoprolol either - BP on admission was 79/57 and improved after 500 mL bolus.  Pt's leg edema is minimal and much improved from prior as was started on lasix outpt.  Pt not clinically volume overloaded thus will cancel bumex.  Hold lisinopril and cont Toprol with parameters.  Pt had also been weaned off steroids recently and could consider a component of adrenal insufficiency but most likely hypovolemia with abd pain, bloating, decreased po intake and lasix use PTA.  If cont to drop would give more IVF and if persists consider stress dose hydrocortisone for concern of adrenal insufficiency.  Unlikely PE causing hypotension but could be consideration.  ?due to drop in hgb 12.1 to 9.9 in 4 hrs this am but no signs of bleeding but also started on anticoagulation/heparin gtt -- will repeat h/h now.  Stop flexeril also. Pt asx but will cont monitor closely.    Electronically signed by SUZAN SILVA MD on 1/21/2023 at 4:31 PM

## 2023-01-21 NOTE — ED PROVIDER NOTES
Addendum: Care was assumed at 6 AM the patient had elevated D-dimer and that get a CT scan that showed a right lower lobe PE. The patient was started on heparin. Patient be admitted to hospital service.     Diagnosis   Pulmonary embolus     Mehul Joelma  01/21/23 0340

## 2023-01-21 NOTE — ED PROVIDER NOTES
[facility] EMERGENCY DEPT      EMERGENCY MEDICINE     Pt Name: Kaleigh Sierra  MRN: [id number]  Birthdate 1950  Date of evaluation: 1/21/2023  Provider: Rowena Larios PA-C    CHIEF COMPLAINT       Chief Complaint   Patient presents with    Nausea     HISTORY OF PRESENT ILLNESS   Kaleigh Sierra is a pleasant 72 y.o. female who presents to the emergency department from from home, as a walk in to the ED lobby for evaluation of nausea and abdominal bloating.  Patient states that yesterday she began having some bloating in the abdomen.  Had decreased appetite at breakfast but was able to eat and okay lunch.  In the evening her nausea started getting worse.  She tried a Pepto-Bismol tablet but did not repeat this as it has caused constipation for her in the past.  States she has had associated dry heaving but no vomiting.  She has been having some loose stools recently due to use of laxatives with last stool 2 hours ago.  No change in pain after stool production but she did temporarily have decreased nausea.  No blood in her stools.  No fevers or diarrhea.  She has no associated urinary symptoms but reports history of asymptomatic UTIs, most recently 6 to 8 weeks ago with associated altered mentation at that time.  Some chronic cough with tracheostomy present.  Denies any current change in that and denies any chest pain, shortness of breath or palpitations.  She is a diabetic, reports her glucose has been doing good.  She was recently weaned off of prednisone for an upcoming procedure on her tracheostomy.  She does note some recent issues with pain and swelling in the lower leg.  States that the right side is painful.  States she is minimally active over the past year, primarily sits in the chair throughout the day.  No history of DVT or PE.  Reports her baseline heart rate is typically in the 80s to 100 and that its typical for her heart rate to elevate into the 130s with ambulation across the  room.    PASTMEDICAL HISTORY     Past Medical History:   Diagnosis Date    Arthritis     Coronary artery disease     COVID     Hyperlipidemia     Primary hypertension     Type 2 diabetes mellitus ([address] 2018       Patient Active Problem List   Diagnosis Code    COVID-19 U07.1    Hypoxia R09.02    Acute respiratory failure with hypoxia (HCC) J96.01    Debility R53.81    Physical deconditioning R53.81    History of COVID-19 Z86.16    Dysarthria R47.1    Primary hypertension I10    Type 2 diabetes mellitus without complication, with long-term current use of insulin (HCC) E11.9, Z79.4    Obesity (BMI 30-39. 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    Hoarseness R49.0    Unstable angina ([facility]) I20.0    Chronic respiratory failure with hypoxia (HCC) J96.11    Acute chest pain R07.9    Coronary artery disease involving native heart I25.10    Normocytic anemia D64.9    Anxiety disorder F41.9    Gastroesophageal reflux disease K21.9    Other tracheostomy complication ([address] J74.35    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    Status post surgery Z98.890    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 ([address] E08.59    Elevated serum creatinine R79.89    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    Airway compromise J98.8    Thrombocytopenia, unspecified ([address] D69.6    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     SURGICAL HISTORY       Past Surgical History:   Procedure Laterality Date    CARDIAC SURGERY      CATARACT REMOVAL Bilateral 2020     SECTION      3 times    CORONARY ANGIOPLASTY WITH STENT PLACEMENT  2008    stenting twice    EYE SURGERY HIATAL HERNIA REPAIR      late 1990s    HYSTERECTOMY, TOTAL ABDOMINAL (CERVIX REMOVED)      in 1990s    LARYNGOSCOPY N/A 3/22/2022    SUSPENSON LARYNGOSCOPY WITH JET VENT, DILATION performed by Layla Madrid MD at [address]  N/A 3/28/2022    SUSPENSION MICROLARYNGOSCOPY WITH BALLOON DILATION AND JET VENT, KENALOG INJECTION performed by Layla Madrid MD at [address]  N/A 4/7/2022    Suspension Laryngoscopy  Lateral of Right True Vocal Cord, With Steroid Injection of Larynx And Tracheostomy Tube Change, debridement performed by Vidal Holliday MD at [address]  N/A 8/10/2022    TRANSORAL LARYNGOPLASTY WITH LEFT PARTIAL ARYTENODECTOMY AND POSS LATERALIZATION, ADVANCEMENT FLAP RECONSTRUCTION WITH JET VENT,THERAPUTIC BRONCHOSCOPY WITH DEBRIEDMENT,WITH BALLOON DILITATION performed by Vidal Holliday MD at [address]  N/A 8/31/2022    Laryngoscopy with Dilation Debridement  Bronchoscopy with Dilation & Resection of Obstructing Tissues Transoral Laryngoplasty Left True Vocal Fold Lateralization left partial aryteniodectomy performed by Vidal Holliday MD at [address]      [place] [address] 4/1/2022    TRACHEOTOMY TUBE REPLACEMENT performed by Layla Madrid MD at [address]       Previous Medications    ALBUTEROL (ACCUNEB) 0.63 MG/3ML NEBULIZER SOLUTION    Take 1 ampule by nebulization every 6 hours as needed for Wheezing    ALBUTEROL SULFATE  (90 BASE) MCG/ACT INHALER    Inhale 2 puffs into the lungs every 6 hours as needed for Wheezing    ASPIRIN 81 MG CHEWABLE TABLET    Take 81 mg by mouth daily    BENZOCAINE-MENTHOL (CEPACOL) 15-2.3 MG LOZG    Take 1 lozenge by mouth every 3 hours as needed (sore throat, pain)    CHOLECALCIFEROL (VITAMIN D3 PO)    Take by mouth daily 4000 units    FEXOFENADINE HCL (MUCINEX ALLERGY PO)    Take 1 tablet by mouth 2 times daily Cuts in half to swallow easier FUROSEMIDE (LASIX) 40 MG TABLET    Take 1 tablet by mouth daily    GLUCAGON 1 MG INJECTION    Inject 1 mg into the muscle See Admin Instructions Follow package directions for low blood sugar. GUAIFENESIN 400 MG TABLET    Take 400 mg by mouth 2 times daily as needed for Cough    INSULIN ASPART (NOVOLOG FLEXPEN SC)    Inject into the skin    INSULIN ASPART (NOVOLOG FLEXPEN) 100 UNIT/ML INJECTION PEN    Inject 20-25 Units into the skin in the morning and 20-25 Units at noon and 20-25 Units in the evening. Inject before meals. INSULIN PEN NEEDLE 31G X 6 MM MISC    1 each by Does not apply route daily    LANTUS SOLOSTAR 100 UNIT/ML INJECTION PEN    Inject 50 Units into the skin nightly    LIDOCAINE (XYLOCAINE) 4 % EXTERNAL SOLUTION    Apply 1 Dose topically as needed for Pain Apply topically as needed. LISINOPRIL (PRINIVIL;ZESTRIL) 10 MG TABLET    Take 1 tablet by mouth daily    METOPROLOL SUCCINATE (TOPROL XL) 25 MG EXTENDED RELEASE TABLET    Take 1 tablet by mouth daily    NITROGLYCERIN (NITROSTAT) 0.4 MG SL TABLET    Place 0.4 mg under the tongue every 5 minutes as needed for Chest pain up to max of 3 total doses. If no relief after 1 dose, call 911. NONFORMULARY    daily resveratrol    OXYGEN    Inhale 2 L/min into the lungs continuous prn (during activities such as walking)    PREDNISONE (DELTASONE) 10 MG TABLET    Take 1.5 tablets by mouth 2 times daily    ROSUVASTATIN (CRESTOR) 10 MG TABLET    Take 10 mg by mouth daily    TIZANIDINE (ZANAFLEX) 4 MG TABLET           ALLERGIES     is allergic to metformin and related, codeine, meperidine hcl, sulfa antibiotics, and sumatriptan. FAMILY HISTORY     She indicated that her father is .        SOCIAL HISTORY       Social History     Tobacco Use    Smoking status: Never    Smokeless tobacco: Never   Vaping Use    Vaping Use: Never used    Passive vaping exposure: Yes   Substance Use Topics    Alcohol use: Not Currently     Comment: drinks 1 glass of wine cooler or wine about 3 times per year    Drug use: Never       PHYSICAL EXAM       ED Triage Vitals [23 0224]   BP Temp Temp Source Heart Rate Resp SpO2 Height Weight   (!) 79/57 99.6 °F (37.6 °C) Oral (!) 128 18 98 % -- --       Additional Vital Signs:  Vitals:    23 0516   BP: 110/63   Pulse: (!) 107   Resp: 20   Temp:    SpO2: 98%     Physical Exam  Vitals and nursing note reviewed. HENT:      Head: Normocephalic and atraumatic. Eyes:      Conjunctiva/sclera: Conjunctivae normal.   Neck:      Comments: Tracheostomy tube in place with mask  Cardiovascular:      Rate and Rhythm: Normal rate and regular rhythm. Pulses:           Dorsalis pedis pulses are 2+ on the right side and 2+ on the left side. Heart sounds: Murmur (2/6 systolic murmur) heard. Pulmonary:      Effort: Pulmonary effort is normal. No respiratory distress. Breath sounds: Normal breath sounds. No wheezing or rhonchi. Abdominal:      Palpations: Abdomen is soft. Tenderness: There is abdominal tenderness in the epigastric area and left upper quadrant. There is right CVA tenderness and left CVA tenderness. There is no guarding or rebound. Musculoskeletal:         General: Tenderness present. Cervical back: Normal range of motion. Right lower leg: Tenderness (With mild warmth to the right calf) present. 3+ Pitting Edema present. Left lower le+ Pitting Edema present. Skin:     General: Skin is warm and dry. Neurological:      General: No focal deficit present. Mental Status: She is alert and oriented to person, place, and time. Psychiatric:         Mood and Affect: Mood normal.       FORMAL DIAGNOSTIC RESULTS     RADIOLOGY: Interpretation per the Radiologist below, if available at the time of this note (none if blank):    XR CHEST (2 VW)   Final Result   Impression:   No acute cardiopulmonary. This document has been electronically signed by: Tank Ron on 01/21/2023 04:56 AM      XR ABDOMEN (KUB) (SINGLE AP VIEW)   Final Result   Impression: No acute process. This document has been electronically signed by: Sabrina Fox on    01/21/2023 04:58 AM          LABS: (none if blank)  Labs Reviewed   CBC WITH AUTO DIFFERENTIAL - Abnormal; Notable for the following components:       Result Value    WBC 12.6 (*)     RBC 3.69 (*)     Hematocrit 36.7 (*)     MCV 99.5 (*)     RDW-CV 15.7 (*)     RDW-SD 56.9 (*)     Segs Absolute 7.8 (*)     Immature Grans (Abs) 0.35 (*)     All other components within normal limits   TROPONIN - Abnormal; Notable for the following components:    Troponin T 0.021 (*)     All other components within normal limits   COVID-19 & INFLUENZA COMBO   COMPREHENSIVE METABOLIC PANEL W/ REFLEX TO MG FOR LOW K   BRAIN NATRIURETIC PEPTIDE   LIPASE   PROCALCITONIN   ANION GAP   GLOMERULAR FILTRATION RATE, ESTIMATED   OSMOLALITY   D-DIMER, QUANTITATIVE   URINALYSIS WITH REFLEX TO CULTURE       (Any cultures that may have been sent were not resulted at the time of this patient visit)    [facility] / ED COURSE:     1) Number and Complexity of Problems            Problem List This Visit:         Chief Complaint   Patient presents with    Nausea   Patient presents tachycardic with hypotension and low-grade fever for complaint of nausea and abdominal bloating with pain. No complaints of chest pain, shortness of breath or palpitations. EKG showing sinus tachycardia with occasional PVCs and low voltage QRS. X-rays were unremarkable. Laboratory studies significant for 12,000 white blood cell count and troponin elevation with flu and COVID swabs negative. Patient was treated with Tylenol, Zofran, GI cocktail. Nausea now resolved. Abdominal discomfort resolved. She is now reporting only sensation of some persistent bloating in the upper abdomen and tenderness in the left upper back which is very mild.   No complaints of chest pain, pressure, discomfort. Venous ultrasound not available at this time. Due to the right lower extremity pain/swelling/warmth, D-dimer was ordered. This is still pending at end of shift. She was signed out at end of shift pending possible CTA if D-dimer elevated and anticipated admission for the elevated troponin. Differential Diagnosis includes (but not limited to):  Gastritis, pancreatitis, gastroenteritis, ACS, DVT        Diagnoses Considered but I have low suspicion of:   Mesenteric ischemia, dissection             Pertinent Comorbid Conditions:    Diabetes    2)  Data Reviewed (none if left blank)          My Independent interpretations:     EKG:      Sinus tachycardia with PVCs. Imaging: X-rays unremarkable. Labs: White blood cell count 12,000, troponin elevated. Labs otherwise noncontributory. D-dimer pending. Decision Rules/Clinical Scores utilized: Unable to rule out PE with [facility] rule            External Documentation Reviewed:         Previous patient encounter documents & history available on EMR was reviewed              See Formal Diagnostic Results above for the lab and radiology tests and orders. 3)  Treatment and Disposition         ED Reassessment: Symptoms notably improved. Case discussed with consulting clinician: No         Shared Decision-Making was performed and disposition discussed with the        Patient/Family and questions answered          Social determinants of health impacting treatment or disposition: None         Code Status: Full      Summary of Patient Presentation:      MDM  /   Ivar Crass Reviewed:    Vitals:    01/21/23 0224 01/21/23 0403 01/21/23 0421 01/21/23 0516   BP: (!) 79/57 113/89 123/79 110/63   Pulse: (!) 128  (!) 113 (!) 107   Resp: 18  21 20   Temp: 99.6 °F (37.6 °C)      TempSrc: Oral      SpO2: 98%  99% 98%       The patient was seen and examined.  Appropriate diagnostic testing was performed and results reviewed with the patient. The results of pertinent diagnostic studies and exam findings were discussed. The patients provisional diagnosis and plan of care were discussed with the patient and present family who expressed understanding. Any medications were reviewed and indications and risks of medications were discussed with the patient /family present. ED Medications administered this visit:  (None if blank)  Medications   acetaminophen (TYLENOL) tablet 650 mg (650 mg Oral Given 1/21/23 0332)   ondansetron (ZOFRAN) injection 4 mg (4 mg IntraVENous Given 1/21/23 0400)   aluminum & magnesium hydroxide-simethicone (MAALOX) 30 mL, lidocaine viscous hcl (XYLOCAINE) 5 mL (GI COCKTAIL) ( Oral Given 1/21/23 0333)         PROCEDURES: (None if blank)  Procedures:     CRITICAL CARE: (None if blank)      DISCHARGE PRESCRIPTIONS: (None if blank)  New Prescriptions    No medications on file       FINAL IMPRESSION      1. Nausea and vomiting, unspecified vomiting type    2. Abdominal bloating          DISPOSITION/PLAN   DISPOSITION        OUTPATIENT FOLLOW UP THE PATIENT:  No follow-up provider specified.     SAADIA Suárez PA-C  01/21/23 8358

## 2023-01-21 NOTE — PROGRESS NOTES
Pt admitted to  8b34 from ED. Complaints: Shortness of breath. IV site free of s/s of infection or infiltration. Vital signs obtained. Assessment and data collection initiated. Two nurse skin assessment performed by Renee Arrington and Cath RN. Oriented to room. Explained patients right to have family, representative or physician notified of their admission. Patient has Declined for physician to be notified. Patient has Declined for family/representative to be notified. The patient is interested in [facility]. [facility] meds to beds program?:  No    Policies and procedures for  explained. All questions answered with no further questions at this time. Fall prevention and safety brochure discussed with patient. Bed alarm on. Call light in reach.

## 2023-01-22 LAB
ANION GAP SERPL CALC-SCNC: 14 MEQ/L (ref 8–16)
BACTERIA UR CULT: ABNORMAL
BUN SERPL-MCNC: 7 MG/DL (ref 7–22)
CALCIUM SERPL-MCNC: 8 MG/DL (ref 8.5–10.5)
CHLORIDE SERPL-SCNC: 100 MEQ/L (ref 98–111)
CO2 SERPL-SCNC: 20 MEQ/L (ref 23–33)
CREAT SERPL-MCNC: 0.4 MG/DL (ref 0.4–1.2)
DEPRECATED RDW RBC AUTO: 55.3 FL (ref 35–45)
EKG ATRIAL RATE: 119 BPM
EKG P AXIS: 51 DEGREES
EKG P-R INTERVAL: 138 MS
EKG Q-T INTERVAL: 334 MS
EKG QRS DURATION: 78 MS
EKG QTC CALCULATION (BAZETT): 469 MS
EKG R AXIS: 84 DEGREES
EKG T AXIS: 62 DEGREES
EKG VENTRICULAR RATE: 119 BPM
ERYTHROCYTE [DISTWIDTH] IN BLOOD BY AUTOMATED COUNT: 15.6 % (ref 11.5–14.5)
GFR SERPL CREATININE-BSD FRML MDRD: > 60 ML/MIN/1.73M2
GLUCOSE BLD STRIP.AUTO-MCNC: 120 MG/DL (ref 70–108)
GLUCOSE BLD STRIP.AUTO-MCNC: 124 MG/DL (ref 70–108)
GLUCOSE BLD STRIP.AUTO-MCNC: 130 MG/DL (ref 70–108)
GLUCOSE BLD STRIP.AUTO-MCNC: 152 MG/DL (ref 70–108)
GLUCOSE SERPL-MCNC: 111 MG/DL (ref 70–108)
HCT VFR BLD AUTO: 26.6 % (ref 37–47)
HEPARIN UNFRACTIONATED: 0.55 U/ML (ref 0.3–0.7)
HEPARIN UNFRACTIONATED: 0.61 U/ML (ref 0.3–0.7)
HEPARIN UNFRACTIONATED: 0.74 U/ML (ref 0.3–0.7)
HEPARIN UNFRACTIONATED: 0.82 U/ML (ref 0.3–0.7)
HGB BLD-MCNC: 8.6 GM/DL (ref 12–16)
MCH RBC QN AUTO: 31.7 PG (ref 26–33)
MCHC RBC AUTO-ENTMCNC: 32.3 GM/DL (ref 32.2–35.5)
MCV RBC AUTO: 98.2 FL (ref 81–99)
ORGANISM: ABNORMAL
PLATELET # BLD AUTO: 209 THOU/MM3 (ref 130–400)
PMV BLD AUTO: 9.6 FL (ref 9.4–12.4)
POTASSIUM SERPL-SCNC: 4.3 MEQ/L (ref 3.5–5.2)
RBC # BLD AUTO: 2.71 MILL/MM3 (ref 4.2–5.4)
SODIUM SERPL-SCNC: 134 MEQ/L (ref 135–145)
WBC # BLD AUTO: 7.7 THOU/MM3 (ref 4.8–10.8)

## 2023-01-22 PROCEDURE — 93010 ELECTROCARDIOGRAM REPORT: CPT | Performed by: INTERNAL MEDICINE

## 2023-01-22 PROCEDURE — G0378 HOSPITAL OBSERVATION PER HR: HCPCS

## 2023-01-22 PROCEDURE — 96376 TX/PRO/DX INJ SAME DRUG ADON: CPT

## 2023-01-22 PROCEDURE — 99232 SBSQ HOSP IP/OBS MODERATE 35: CPT | Performed by: PHYSICIAN ASSISTANT

## 2023-01-22 PROCEDURE — 36415 COLL VENOUS BLD VENIPUNCTURE: CPT

## 2023-01-22 PROCEDURE — 6370000000 HC RX 637 (ALT 250 FOR IP): Performed by: FAMILY MEDICINE

## 2023-01-22 PROCEDURE — 6360000002 HC RX W HCPCS: Performed by: PHYSICIAN ASSISTANT

## 2023-01-22 PROCEDURE — 82948 REAGENT STRIP/BLOOD GLUCOSE: CPT

## 2023-01-22 PROCEDURE — 6370000000 HC RX 637 (ALT 250 FOR IP): Performed by: STUDENT IN AN ORGANIZED HEALTH CARE EDUCATION/TRAINING PROGRAM

## 2023-01-22 PROCEDURE — 80048 BASIC METABOLIC PNL TOTAL CA: CPT

## 2023-01-22 PROCEDURE — 2700000000 HC OXYGEN THERAPY PER DAY

## 2023-01-22 PROCEDURE — 94761 N-INVAS EAR/PLS OXIMETRY MLT: CPT

## 2023-01-22 PROCEDURE — 85520 HEPARIN ASSAY: CPT

## 2023-01-22 PROCEDURE — 85027 COMPLETE CBC AUTOMATED: CPT

## 2023-01-22 RX ADMIN — HEPARIN SODIUM 12 UNITS/KG/HR: 10000 INJECTION, SOLUTION INTRAVENOUS at 09:41

## 2023-01-22 RX ADMIN — HEPARIN SODIUM 13 UNITS/KG/HR: 10000 INJECTION, SOLUTION INTRAVENOUS at 07:23

## 2023-01-22 RX ADMIN — SIMETHICONE 80 MG: 80 TABLET, CHEWABLE ORAL at 07:50

## 2023-01-22 RX ADMIN — ROSUVASTATIN 10 MG: 10 TABLET, FILM COATED ORAL at 07:50

## 2023-01-22 RX ADMIN — INSULIN GLARGINE 22 UNITS: 100 INJECTION, SOLUTION SUBCUTANEOUS at 21:58

## 2023-01-22 RX ADMIN — ASPIRIN 81 MG 81 MG: 81 TABLET ORAL at 07:50

## 2023-01-22 ASSESSMENT — PAIN SCALES - GENERAL
PAINLEVEL_OUTOF10: 0
PAINLEVEL_OUTOF10: 4

## 2023-01-22 ASSESSMENT — PAIN DESCRIPTION - PAIN TYPE: TYPE: ACUTE PAIN

## 2023-01-22 ASSESSMENT — PAIN DESCRIPTION - ORIENTATION: ORIENTATION: LOWER

## 2023-01-22 ASSESSMENT — PAIN DESCRIPTION - DESCRIPTORS: DESCRIPTORS: ACHING

## 2023-01-22 ASSESSMENT — PAIN DESCRIPTION - ONSET: ONSET: ON-GOING

## 2023-01-22 ASSESSMENT — PAIN DESCRIPTION - LOCATION: LOCATION: BUTTOCKS

## 2023-01-22 ASSESSMENT — PAIN DESCRIPTION - FREQUENCY: FREQUENCY: CONTINUOUS

## 2023-01-22 ASSESSMENT — PAIN - FUNCTIONAL ASSESSMENT: PAIN_FUNCTIONAL_ASSESSMENT: ACTIVITIES ARE NOT PREVENTED

## 2023-01-22 NOTE — PLAN OF CARE
Problem: Discharge Planning  Goal: Discharge to home or other facility with appropriate resources  1/22/2023 0906 by Rachael Navarro RN  Outcome: Progressing  Flowsheets (Taken 1/22/2023 4101)  Discharge to home or other facility with appropriate resources:   Identify barriers to discharge with patient and caregiver   Arrange for needed discharge resources and transportation as appropriate   Identify discharge learning needs (meds, wound care, etc)   Arrange for interpreters to assist at discharge as needed   Refer to discharge planning if patient needs post-hospital services based on physician order or complex needs related to functional status, cognitive ability or social support system  Note: Pt plans home with  at discharge. Care manager and social working helping with discharge needs. Problem: Skin/Tissue Integrity  Goal: Absence of new skin breakdown  Description: 1. Monitor for areas of redness and/or skin breakdown  2. Assess vascular access sites hourly  3. Every 4-6 hours minimum:  Change oxygen saturation probe site  4. Every 4-6 hours:  If on nasal continuous positive airway pressure, respiratory therapy assess nares and determine need for appliance change or resting period. 1/22/2023 0906 by Rachael Navarro RN  Outcome: Progressing  Note: Pt understands the importance of frequent repositioning in order to prevent any skin breakdown. Problem: Safety - Adult  Goal: Free from fall injury  1/22/2023 0906 by Rachael Navarro RN  Outcome: Progressing  Flowsheets (Taken 1/22/2023 0906)  Free From Fall Injury:   Instruct family/caregiver on patient safety   Based on caregiver fall risk screen, instruct family/caregiver to ask for assistance with transferring infant if caregiver noted to have fall risk factors  Note: Patient using call light appropriate this shift for safety.       Problem: Chronic Conditions and Co-morbidities  Goal: Patient's chronic conditions and co-morbidity symptoms are monitored and maintained or improved  1/22/2023 0906 by Carly Lopez RN  Outcome: Progressing    Care plan reviewed with patient and . Patient and  verbalize understanding of the plan of care and contribute to goal setting.

## 2023-01-22 NOTE — PROGRESS NOTES
Hospitalist Progress Note      Patient:  Miguel Hayes    Unit/Bed:8B-34/034-A  YOB: 1950  MRN: [id number]   Acct: [de-identified]   PCP: Josephine Valente DO  Date of Admission: 1/21/2023    Assessment/Plan:    Acute PE/DVT: CTA chest: Filling defects in the right lower lobe pulmonary arterial branches consistent with acute pulmonary emboli. No evidence of right ventricular strain. US Duplex + for DVT Areas of abnormal echogenicity in the right posterior tibial vein, left greater saphenous, left peroneal and left anterior tibial veins suspicious for thrombi. Likely due to reduced mobility, walks few feet at home but since COVID, less active. IV Heparin drip. Pt will need to be on DOAC prior to DC. Bilateral LE edema: Likely due to DVTs. PT/OT. Essential HTN: BP controlled today. BP medications with parameters. CAD: s/p PCI in the history. Lipitor & ASA. IDDMII: HbA1c 8.8 on 11/17/22. On Lantus 44u nightly with Humalog at home. Will repeat A1c. Resume home Lantus with SSI. Monitor BG with inpatient goal 140-180. Hx COVID s/p tracheostomy: 12/2021-04/2022  Elevated troponin -- 0.021 -> 0.019 in ER -- likely due to PE -- no acute EKG changes -- check Echo - last echo 3/2022 with EF 60%, no WMA - stress 6/2022 non-diagnostic for ischemia, normal EF. Chief Complaint: Abdominal Bloating     Initial H and P:-    Initial H&P \"79 yo F with history of DM, HTN, COVID 12/2020 s/p tracheostomy 04/2022 follows with Dr. Eliazar Keys, presented to [facility] ED for complaints of abdominal bloating with nausea/vomiting. Ed: afebrile, hemodynamically stable tachycardic, saturating well on 0.5 L trach mask. Labs notable for Trop 0.021 then 0.019 on repeat, leukocytosis 12.6. Ddimer elevation ~5000. CTA chest: Filling defects in the right lower lobe pulmonary arterial branches consistent with acute pulmonary emboli. No evidence of right ventricular strain. CXR WNL. KUB with no acute process. US Duplex + for distal DVT Areas of abnormal echogenicity in the right posterior tibial vein, left greater saphenous, left peroneal and left anterior tibial veins suspicious for thrombi. Started on heparin gtt. Given reglan ang GI cocktal. Had a BM. Admitted for further management. On eval, per patient and her  present at bedside, reports has been having issues with constipation and bloating last couple of days which got worse. Woke up with worsened abdominal bloating/distention with nausea and vomiting. Had a large BM overnight, feels slightly better since then. No subjective fever, chills, dizziness, diarrhea. Dyspnea was in the setting of weaning down her oxygen needs following a weaning protcol. No chest pain, palpitations, syncope. Follows with Dr. Lupis Sherman. Plan for weaning off trach with a procedure on 02/09/2023. \"     Subjective (past 24 hours):   No acute events overnight. Patient denies any pain at this time. Patient is alert and oriented and tolerating well. Patient is on home oxygen on trach collar. Past medical history, family history, social history and allergies reviewed again and is unchanged since admission. ROS (All review of systems completed. Pertinent positives noted.  Otherwise All other systems reviewed and negative.)     Medications:  Reviewed    Infusion Medications    heparin (PORCINE) Infusion 12 Units/kg/hr (01/22/23 1314)    sodium chloride      dextrose       Scheduled Medications    sodium chloride flush  5-40 mL IntraVENous 2 times per day    aspirin  81 mg Oral Daily    [Held by provider] furosemide  40 mg Oral Daily    [Held by provider] lisinopril  10 mg Oral Daily    metoprolol succinate  25 mg Oral Daily    rosuvastatin  10 mg Oral Daily    simethicone  80 mg Oral 4x Daily    insulin lispro  0-4 Units SubCUTAneous TID WC    insulin lispro  0-4 Units SubCUTAneous Nightly    insulin glargine  22 Units SubCUTAneous Nightly     PRN Meds: heparin (porcine), heparin (porcine), sodium chloride flush, sodium chloride, ondansetron **OR** ondansetron, polyethylene glycol, acetaminophen **OR** acetaminophen, albuterol, albuterol sulfate HFA, nitroGLYCERIN, glucose, dextrose bolus **OR** dextrose bolus, glucagon (rDNA), dextrose      Intake/Output Summary (Last 24 hours) at 1/22/2023 1754  Last data filed at 1/22/2023 1314  Gross per 24 hour   Intake 307.09 ml   Output --   Net 307.09 ml       Diet:  ADULT DIET; Clear Liquid; 5 carb choices (75 gm/meal)    Physical Exam:  BP (!) 83/56   Pulse (!) 108   Temp 99.1 °F (37.3 °C) (Oral)   Resp 19   Ht 5' 2\" (1.575 m)   Wt 174 lb 14.4 oz (79.3 kg)   LMP  (LMP Unknown)   SpO2 98%   BMI 31.99 kg/m²   General appearance: Chronically ill appearing white female. No apparent distress, appears stated age and cooperative. HEENT: Pupils equal, round, and reactive to light. Conjunctivae/corneas clear. Neck: Supple, with full range of motion. No jugular venous distention. Trach in place. Respiratory:  Normal respiratory effort on trach mask. Breath sounds diminished. Cardiovascular: Regular rate and rhythm with normal S1/S2 without murmurs, rubs or gallops. Abdomen: Soft, non-tender, non-distended with normal bowel sounds. Musculoskeletal: passive and active ROM x 4 extremities. Skin: Skin color, texture, turgor normal.  No rashes or lesions. Neurologic:  Neurovascularly intact without any focal sensory/motor deficits.  Cranial nerves: II-XII intact, grossly non-focal.  Psychiatric: Alert and oriented, thought content appropriate, normal insight  Capillary Refill: Brisk,< 3 seconds   Peripheral Pulses: +2 palpable, equal bilaterally     Labs:   Recent Labs     01/21/23  0415 01/21/23  0808 01/21/23  1700 01/21/23  2216   WBC 12.6* 10.0  --   --    HGB 12.1 9.9* 9.3* 9.0*   HCT 36.7* 32.0* 28.7* 27.5*    225  --   --      Recent Labs     01/21/23  0415      K 3.9      CO2 25   BUN 16 CREATININE 0.7   CALCIUM 9.0     Recent Labs     01/21/23  0415   AST 34   ALT 53   BILITOT 0.4   ALKPHOS 84     Recent Labs     01/21/23  1400   INR 1.09     No results for input(s): Fani Rodriguez in the last 72 hours. Microbiology:    Blood culture #1:   Lab Results   Component Value Date/Time    BC No growth-preliminary 01/02/2022 03:29 PM    BC  01/02/2022 03:29 PM     possible contamination; clinical correlation required    BC No growth-preliminary No growth 01/02/2022 03:29 PM       Blood culture #2:No results found for: Amaryllis Chana    Organism:  Lab Results   Component Value Date/Time    ORG Growth of Contaminants 01/21/2023 09:30 AM         Lab Results   Component Value Date/Time    LABGRAM  03/28/2022 12:53 PM     Rare segmented neutrophils observed. No epithelial cells observed. No bacteria seen. MRSA culture only:No results found for: [facility]    Urine culture:   Lab Results   Component Value Date/Time    LABURIN  11/17/2022 04:25 PM     No growth-preliminary Mixed growth. The mixture of organisms present represents both organisms that may cause urinary tract infections and organisms that are not a common cause of urinary tract infections and are possibly skin john or distal urethral john. Respiratory culture: No results found for: CULTRESP    Aerobic and Anaerobic :  Lab Results   Component Value Date/Time    LABAERO Normal john 03/28/2022 12:53 PM     No results found for: LABANAE    Urinalysis:      Lab Results   Component Value Date/Time    NITRU NEGATIVE 01/21/2023 09:50 AM    WBCUA 10-15 01/21/2023 09:50 AM    BACTERIA NONE SEEN 01/21/2023 09:50 AM    RBCUA 0-2 01/21/2023 09:50 AM    BLOODU NEGATIVE 01/21/2023 09:50 AM    SPECGRAV 1.013 08/12/2022 12:30 PM    GLUCOSEU NEGATIVE 01/21/2023 09:50 AM       Radiology:  VL DUP LOWER EXTREMITY VENOUS BILATERAL   Final Result      1.  Areas of abnormal echogenicity in the right posterior tibial vein, left greater saphenous, left peroneal and left anterior tibial veins suspicious for thrombi. **This report has been created using voice recognition software. It may contain minor errors which are inherent in voice recognition technology. **      Final report electronically signed by DR Cedric Dwyer on 1/21/2023 11:28 AM      CTA CHEST W WO CONTRAST   Final Result       1. Filling defects in the right lower lobe pulmonary arterial branches consistent with acute pulmonary emboli. No evidence of right ventricular strain. 2.. Atherosclerotic calcification in the thoracic aorta. 3. Borderline cardiomegaly. Calcification versus stent placement in the left anterior descending coronary artery. 4. Thickening off the interstitial lung markings. 5. Thoracic spondylosis. 6. Sclerotic density in the left eighth rib posteriorly. 7. Small hiatal hernia. 9. Results called to [facility]   at 950 AM               **This report has been created using voice recognition software. It may contain minor errors which are inherent in voice recognition technology. **      Final report electronically signed by DR Cedric Dwyer on 1/21/2023 9:57 AM      XR CHEST (2 VW)   Final Result   Impression:   No acute cardiopulmonary. This document has been electronically signed by: Cecilia Ellsworth. Cheyenne Soulier on    01/21/2023 04:56 AM      XR ABDOMEN (KUB) (SINGLE AP VIEW)   Final Result   Impression: No acute process. This document has been electronically signed by: Cecilia Ellsworth. [address] [place] [address] on    01/21/2023 04:58 AM        XR CHEST (2 VW)    Result Date: 1/21/2023  Portable semierect AP upright and lateral chest views. Comparison: CTA chest 4/13/2022. 3/27/2022 2 view chest. Findings: Cardiac silhouette projects enlarged part of which is due to prominent mediastinal fat on comparison CTA chest. No pulmonary venous congestion. No consolidation, pneumothorax, or defined pleural effusion. Left upper extremity PICC line terminates at the superior vena cava. Proximal tracheostomy tube. No acute bony abnormality. Impression: No acute cardiopulmonary. This document has been electronically signed by: Tram Fabianmina Push on 01/21/2023 04:56 AM    XR ABDOMEN (KUB) (SINGLE AP VIEW)    Result Date: 1/21/2023  Supine AP abdomen. Comparison: 1/10/2022 CT abdomen pelvis with contrast. Findings: Mild gaseous intestinal tract. No bowel obstruction. No free air. No apparent organomegaly. Properitoneal flank stripes are visualized. Psoas shadows are partially obscured due to technical factors. Chronic degenerative changes in the spine with levocurvature. Impression: No acute process. This document has been electronically signed by: Tram Fabianmina Push on 01/21/2023 04:58 AM    CTA CHEST W WO CONTRAST    Result Date: 1/21/2023  PROCEDURE: CTA CHEST W WO CONTRAST CLINICAL INFORMATION: PE study Elevated D-dimer  tachycardia. COMPARISON: CTA chest dated 6 June 2022. . TECHNIQUE: 3 mm axial images were obtained through the chest after the administration of IV contrast.  A non-contrast localizer was obtained. 3D reconstructions were performed on the scanner to include MIP coronal and sagittal images through the chest. Isovue was the intravenous contrast utilized. All CT scans at this facility use dose modulation, iterative reconstruction, and/or weight-based dosing when appropriate to reduce radiation dose to as low as reasonably achievable. FINDINGS: There are filling defects in the right lower lobe pulmonary arterial branches consistent with pulmonary emboli. The left pulmonary artery appears be normal. There is mild dilatation of the main pulmonary artery. There is a tracheotomy tube in place. .  There is atherosclerotic calcification in the aortic arch. . There is borderline cardiomegaly. There is no evidence for right ventricular strain.  There is increased attenuation in the left anterior descending coronary artery which could represent calcification versus stent placement. There is no pericardial or pleural effusion. There is no mediastinal, axillary or hilar adenopathy. There is thickening off the interstitial lung markings. . There are no infiltrates. There is mild thoracic spondylosis. . There is a sclerotic density in the left eighth rib posteriorly. There is a small hiatal hernia. .      1. Filling defects in the right lower lobe pulmonary arterial branches consistent with acute pulmonary emboli. No evidence of right ventricular strain. 2.. Atherosclerotic calcification in the thoracic aorta. 3. Borderline cardiomegaly. Calcification versus stent placement in the left anterior descending coronary artery. 4. Thickening off the interstitial lung markings. 5. Thoracic spondylosis. 6. Sclerotic density in the left eighth rib posteriorly. 7. Small hiatal hernia. 9. Results called to [facility]   at 950 AM **This report has been created using voice recognition software. It may contain minor errors which are inherent in voice recognition technology. ** Final report electronically signed by DR Hamida Pete on 1/21/2023 9:57 AM    VL DUP LOWER EXTREMITY VENOUS BILATERAL    Result Date: 1/21/2023  PROCEDURE: VL DUP LOWER EXTREMITY VENOUS BILATERAL CLINICAL INFORMATION: Bilateral lower extremity edema. COMPARISON: No prior study. TECHNIQUE: Venous doppler ultrasound was performed of the bilateral lower extremities using gray scale, color flow and spectral doppler imaging. FINDINGS: There is normal color flow, spectral analysis and compressibility of the right and left common femoral vein, femoral vein and popliteal veins. There is abnormal echogenicity in the right posterior tibial vein consistent with thrombus. There is abnormal echogenicity in the left greater saphenous, peroneal and possibly left anterior tibial veins, suspicious for thrombus. . There is normal color flow and compressibility in the left posterior tibial veins, right anterior tibial veins and right peroneal veins.     1. Areas of abnormal echogenicity in the right posterior tibial vein, left greater saphenous, left peroneal and left anterior tibial veins suspicious for thrombi. **This report has been created using voice recognition software. It may contain minor errors which are inherent in voice recognition technology. ** Final report electronically signed by DR Tuyet Ortiz on 1/21/2023 11:28 AM      Electronically signed by MICHAEL Obrien on 1/22/2023 at 5:54 PM

## 2023-01-22 NOTE — PROGRESS NOTES
Respiratory Care Artificial Airway Evaluation    The patient's airway is older than seven days. The patient's airway does not have panic sutures. Patient's trach is cuffless. Patient experiencing no problems. Type of airway:  [x] Tracheostomy  [] Larngectomy    Size 6    Type  []Shiley adult flexible tracheostomy tube with taperguard cuff   []Shiley disposable cannula cuffed tracheostomy tube (DCT)   []Shiley diposable cannula fenestrated cuffed tracheostomy tube (DFEN)  []Shiley disposable cannula cuffed percutaneous tracheostomy tube (PERC)  []Shiley disposable cannula cuffless tracheostomy tube (DCFS)  [] Shiley disposable cannula cuffless fenestrated tracheostomy tube [facility])  []Shiley extended length cuffless tracheostomy tube  []Shiley extended length cuffed tracheostomy tube  []Shiley laryngectomy tube (LGT)  []Shiley single cannula cuffed tracheostomy tube (SCT)  []Portex Bivona silicone tracheostomy tube  [x]Other: Shiley cuffless    Length  [x]standard  []99 mm  []120 mm  []Other:    Reference number for Shiley tracheostomy tube with taperguard cuff. Needed for homecare to order. Located on flange.    [] [facility]    [] [facility]  [] [address] [facility]  [] [id number]  [] [id number]  [] [id number]  [x] [id number]  [] [id number]    Inner cannula size for Shiley tracheostomy tube with taperguard cuff  [] 4IC65  [] 6IC75  [] [id number]

## 2023-01-22 NOTE — FLOWSHEET NOTE
01/21/23 2054   Treatment Team Notification   Reason for Communication Abnormal vitals   Team Member Name [facility]   Treatment Team Role Consulting Provider   Method of Communication Secure Message   Response En route   Notification Time 2054     8B34: Patient admitted today for PE. HR this evening is 128, BP 98/58, saturation 96% on 8L 28 FIO2 on a trach mask. Are there any new orders you would like? Thank You.    2059 Dr. Janett Navarro responded that he would review and update. He then arrived to the floor to review case. He placed a hemoglobin lab draw and stated he would stop back up to check on the patient later.

## 2023-01-22 NOTE — FLOWSHEET NOTE
01/21/23 2001   Treatment Team Notification   Reason for Communication Abnormal vitals   Team Member Name Thierry Madrid   Treatment Team Role Advanced Practice Nurse   Method of Communication Secure Message   Response Other (Comment)  (message someone else)   Notification Time 2001     8B34: Patient admitted today for PE. Patient's HR this evening is 128. BP 98/58 and saturation is 96%on 8L and 28 FIO2 on a trach mask. Are there any new orders you would like? Thank You.    Melanie Leonardo responded with This is an IM resident patient which is Dr. David Pitt. Please message him.

## 2023-01-23 LAB
DEPRECATED RDW RBC AUTO: 53.6 FL (ref 35–45)
ERYTHROCYTE [DISTWIDTH] IN BLOOD BY AUTOMATED COUNT: 15.3 % (ref 11.5–14.5)
GLUCOSE BLD STRIP.AUTO-MCNC: 121 MG/DL (ref 70–108)
GLUCOSE BLD STRIP.AUTO-MCNC: 135 MG/DL (ref 70–108)
GLUCOSE BLD STRIP.AUTO-MCNC: 138 MG/DL (ref 70–108)
GLUCOSE BLD STRIP.AUTO-MCNC: 171 MG/DL (ref 70–108)
HCT VFR BLD AUTO: 25.7 % (ref 37–47)
HEPARIN UNFRACTIONATED: 0.54 U/ML (ref 0.3–0.7)
HEPARIN UNFRACTIONATED: 0.55 U/ML (ref 0.3–0.7)
HGB BLD-MCNC: 8.5 GM/DL (ref 12–16)
LV EF: 63 %
LVEF MODALITY: NORMAL
MCH RBC QN AUTO: 31.8 PG (ref 26–33)
MCHC RBC AUTO-ENTMCNC: 33.1 GM/DL (ref 32.2–35.5)
MCV RBC AUTO: 96.3 FL (ref 81–99)
PLATELET # BLD AUTO: 187 THOU/MM3 (ref 130–400)
PMV BLD AUTO: 9.5 FL (ref 9.4–12.4)
RBC # BLD AUTO: 2.67 MILL/MM3 (ref 4.2–5.4)
WBC # BLD AUTO: 7.2 THOU/MM3 (ref 4.8–10.8)

## 2023-01-23 PROCEDURE — 6370000000 HC RX 637 (ALT 250 FOR IP): Performed by: STUDENT IN AN ORGANIZED HEALTH CARE EDUCATION/TRAINING PROGRAM

## 2023-01-23 PROCEDURE — 96376 TX/PRO/DX INJ SAME DRUG ADON: CPT

## 2023-01-23 PROCEDURE — G0378 HOSPITAL OBSERVATION PER HR: HCPCS

## 2023-01-23 PROCEDURE — 85520 HEPARIN ASSAY: CPT

## 2023-01-23 PROCEDURE — 82948 REAGENT STRIP/BLOOD GLUCOSE: CPT

## 2023-01-23 PROCEDURE — 6360000002 HC RX W HCPCS: Performed by: PHYSICIAN ASSISTANT

## 2023-01-23 PROCEDURE — 99232 SBSQ HOSP IP/OBS MODERATE 35: CPT | Performed by: PHYSICIAN ASSISTANT

## 2023-01-23 PROCEDURE — 36415 COLL VENOUS BLD VENIPUNCTURE: CPT

## 2023-01-23 PROCEDURE — 94761 N-INVAS EAR/PLS OXIMETRY MLT: CPT

## 2023-01-23 PROCEDURE — 85027 COMPLETE CBC AUTOMATED: CPT

## 2023-01-23 PROCEDURE — 2700000000 HC OXYGEN THERAPY PER DAY

## 2023-01-23 PROCEDURE — 93306 TTE W/DOPPLER COMPLETE: CPT

## 2023-01-23 RX ADMIN — ROSUVASTATIN 10 MG: 10 TABLET, FILM COATED ORAL at 08:04

## 2023-01-23 RX ADMIN — HEPARIN SODIUM 12 UNITS/KG/HR: 10000 INJECTION, SOLUTION INTRAVENOUS at 08:14

## 2023-01-23 RX ADMIN — METOPROLOL SUCCINATE 25 MG: 25 TABLET, EXTENDED RELEASE ORAL at 08:04

## 2023-01-23 RX ADMIN — ASPIRIN 81 MG 81 MG: 81 TABLET ORAL at 08:04

## 2023-01-23 RX ADMIN — SIMETHICONE 80 MG: 80 TABLET, CHEWABLE ORAL at 08:07

## 2023-01-23 RX ADMIN — SIMETHICONE 80 MG: 80 TABLET, CHEWABLE ORAL at 18:05

## 2023-01-23 RX ADMIN — SIMETHICONE 80 MG: 80 TABLET, CHEWABLE ORAL at 14:51

## 2023-01-23 RX ADMIN — SIMETHICONE 80 MG: 80 TABLET, CHEWABLE ORAL at 22:14

## 2023-01-23 NOTE — CARE COORDINATION
Case Management Assessment  Initial Evaluation    Date/Time of Evaluation: 1/23/2023 2:59 PM  Assessment Completed by: Karin Camilo RN    If patient is discharged prior to next notation, then this note serves as note for discharge by case management. Patient Name: Marce Titus                   YOB: 1950  Diagnosis: Abdominal bloating [R14.0]  Tachycardia [R00.0]  Other acute pulmonary embolism without acute cor pulmonale ([facility] [address] [I26.99]  Pulmonary embolism, unspecified chronicity, unspecified pulmonary embolism type, unspecified whether acute cor pulmonale present ([address] [I26.99]  Nausea and vomiting, unspecified vomiting type [R11.2]                   Date / Time: 1/21/2023  2:19 AM  [facility]: [facility]     Patient Admission Status: Observation   Readmission Risk (Low < 19, Mod (19-27), High > 27): Readmission Risk Score: 23.2    Current PCP: Betsy Street, DO  PCP verified by CM? Yes    Chart Reviewed: Yes      History Provided by: Patient  Patient Orientation: Alert and Oriented    Patient Cognition: Alert    Hospitalization in the last 30 days (Readmission):  No    If yes, Readmission Assessment in CM Navigator will be completed. Advance Directives:      Code Status: Full Code   Patient's Primary Decision Maker is: Legal Next of Kin    Primary Decision Maker (Active): Evelia Johnson - [phone]    Discharge Planning:    Patient lives with: Spouse/Significant Other Type of Home: House  Primary Care Giver: Self  Patient Support Systems include: Spouse/Significant Other   Current Financial resources: Medicare  Current community resources: ECF/Home Care  Current services prior to admission: Durable Medical Equipment, Home Care            Current DME: [employer], Wheelchair            Type of Home Care services:  PT, Nursing Services    ADLS  Prior functional level: Independent in ADLs/IADLs  Current functional level:  Independent in ADLs/IADLs    Family can provide assistance at DC: Yes  Would you like Case Management to discuss the discharge plan with any other family members/significant others, and if so, who? No  Plans to Return to Present Housing: Yes  Other Identified Issues/Barriers to RETURNING to current housing: No.  Potential Assistance needed at discharge: Other (Comment) (Continued  services.)            Potential DME:    Patient expects to discharge to: House  Plan for transportation at discharge: Family    Financial    Payor: MEDICARE / Plan: MEDICARE PART A AND B / Product Type: *No Product type* /     Does insurance require precert for SNF: No    Potential assistance Purchasing Medications:    Meds-to-Beds request: Yes      [address] , [address] [address]  Phone: [phone] Fax: [phone]      Notes:    Factors facilitating achievement of predicted outcomes: Family support and Has needed Durable Medical Equipment at home    Barriers to discharge: Medical complications    Additional Case Management Notes: Admitted with nausea and abd bloating. Hx Trach as result of Covid. Pt tachy on admit. Ddimer elevated. Found to have Pulm Emboli. Procedure: No.     The Plan for Transition of Care is related to the following treatment goals of Abdominal bloating [R14.0]  Tachycardia [R00.0]  Other acute pulmonary embolism without acute cor pulmonale (HCC) [I26.99]  Pulmonary embolism, unspecified chronicity, unspecified pulmonary embolism type, unspecified whether acute cor pulmonale present ([facility] [address] [I26.99]  Nausea and vomiting, unspecified vomiting type [R11.2]    Patient Goals/Plan/Treatment Preferences: Met with Don Mendoza and spouse today. From home where she is independent. Not currently driving however. She has a PCP and is insured. Looking forward to having her trach reversed soon. Transportation/Food Security/Housekeeping Addressed: No issues identified.      Zina Elissa Wagoner RN  Case Management Department

## 2023-01-23 NOTE — PROGRESS NOTES
Hospitalist Progress Note      Patient:  Marce Titus    Unit/Bed:8B-34/034-A  YOB: 1950  MRN: [id number]   Acct: [de-identified]   PCP: Betsy Street DO  Date of Admission: 1/21/2023    Assessment/Plan:    Acute PE/DVT: CTA chest: Filling defects in the right lower lobe pulmonary arterial branches consistent with acute pulmonary emboli. No evidence of right ventricular strain. US Duplex + for DVT Areas of abnormal echogenicity in the right posterior tibial vein, left greater saphenous, left peroneal and left anterior tibial veins suspicious for thrombi. Likely due to reduced mobility, walks few feet at home but since COVID, less active. IV Heparin drip. Pt will need to be on DOAC prior to DC. Echo pending. Bilateral LE edema: Likely due to DVTs. PT/OT. Essential HTN: BP controlled today. BP medications with parameters. CAD: s/p PCI in the history. Lipitor & ASA. IDDMII: HbA1c 8.8 on 11/17/22. On Lantus 44u nightly with Humalog at home. Will repeat A1c. Resume home Lantus with SSI. Monitor BG with inpatient goal 140-180. Hx COVID s/p tracheostomy: 12/2021-04/2022  Elevated troponin -- 0.021 -> 0.019 in ER -- likely due to PE -- no acute EKG changes -- check Echo - last echo 3/2022 with EF 60%, no WMA - stress 6/2022 non-diagnostic for ischemia, normal EF. Chief Complaint: Abdominal Bloating     Initial H and P:-    Initial H&P \"79 yo F with history of DM, HTN, COVID 12/2020 s/p tracheostomy 04/2022 follows with Dr. Pamela Garsia, presented to [facility] ED for complaints of abdominal bloating with nausea/vomiting. Ed: afebrile, hemodynamically stable tachycardic, saturating well on 0.5 L trach mask. Labs notable for Trop 0.021 then 0.019 on repeat, leukocytosis 12.6. Ddimer elevation ~5000. CTA chest: Filling defects in the right lower lobe pulmonary arterial branches consistent with acute pulmonary emboli.  No evidence of right ventricular strain. CXR WNL. KUB with no acute process. US Duplex + for distal DVT Areas of abnormal echogenicity in the right posterior tibial vein, left greater saphenous, left peroneal and left anterior tibial veins suspicious for thrombi. Started on heparin gtt. Given reglan ang GI cocktal. Had a BM. Admitted for further management. On eval, per patient and her  present at bedside, reports has been having issues with constipation and bloating last couple of days which got worse. Woke up with worsened abdominal bloating/distention with nausea and vomiting. Had a large BM overnight, feels slightly better since then. No subjective fever, chills, dizziness, diarrhea. Dyspnea was in the setting of weaning down her oxygen needs following a weaning protcol. No chest pain, palpitations, syncope. Follows with Dr. Ana Buck. Plan for weaning off trach with a procedure on 02/09/2023. \"     1/22: No acute events overnight. Patient denies any pain at this time. Patient is alert and oriented and tolerating well. Patient is on home oxygen on trach collar. Subjective (past 24 hours):   No acute events overnight. Pt is resting in bed with trach mask. Pt is alert and oriented. Pt is in good spirits. Past medical history, family history, social history and allergies reviewed again and is unchanged since admission. ROS (All review of systems completed. Pertinent positives noted.  Otherwise All other systems reviewed and negative.)     Medications:  Reviewed    Infusion Medications    heparin (PORCINE) Infusion 12 Units/kg/hr (01/23/23 0814)    sodium chloride      dextrose       Scheduled Medications    sodium chloride flush  5-40 mL IntraVENous 2 times per day    aspirin  81 mg Oral Daily    [Held by provider] furosemide  40 mg Oral Daily    [Held by provider] lisinopril  10 mg Oral Daily    metoprolol succinate  25 mg Oral Daily    rosuvastatin  10 mg Oral Daily    simethicone  80 mg Oral 4x Daily    insulin lispro  0-4 Units SubCUTAneous TID     insulin lispro  0-4 Units SubCUTAneous Nightly    insulin glargine  22 Units SubCUTAneous Nightly     PRN Meds: heparin (porcine), heparin (porcine), sodium chloride flush, sodium chloride, ondansetron **OR** ondansetron, polyethylene glycol, acetaminophen **OR** acetaminophen, albuterol, albuterol sulfate HFA, nitroGLYCERIN, glucose, dextrose bolus **OR** dextrose bolus, glucagon (rDNA), dextrose      Intake/Output Summary (Last 24 hours) at 1/23/2023 1429  Last data filed at 1/23/2023 1157  Gross per 24 hour   Intake 600 ml   Output --   Net 600 ml       Diet:  ADULT DIET; Clear Liquid; 5 carb choices (75 gm/meal)    Physical Exam:  BP (!) 140/84   Pulse (!) 107   Temp 98.7 °F (37.1 °C) (Oral)   Resp 17   Ht 5' 2\" (1.575 m)   Wt 174 lb 14.4 oz (79.3 kg)   LMP  (LMP Unknown)   SpO2 98%   BMI 31.99 kg/m²   General appearance: Chronically ill appearing white female. No apparent distress, appears stated age and cooperative. HEENT: Pupils equal, round, and reactive to light. Conjunctivae/corneas clear. Neck: Supple, with full range of motion. No jugular venous distention. Trach in place. Respiratory:  Normal respiratory effort on trach mask. Breath sounds diminished. Cardiovascular: Regular rate and rhythm with normal S1/S2 without murmurs, rubs or gallops. Abdomen: Soft, non-tender, non-distended with normal bowel sounds. Musculoskeletal: passive and active ROM x 4 extremities. Skin: Skin color, texture, turgor normal.  No rashes or lesions. Neurologic:  Neurovascularly intact without any focal sensory/motor deficits.  Cranial nerves: II-XII intact, grossly non-focal.  Psychiatric: Alert and oriented, thought content appropriate, normal insight  Capillary Refill: Brisk,< 3 seconds   Peripheral Pulses: +2 palpable, equal bilaterally     Labs:   Recent Labs     01/21/23  0808 01/21/23  1700 01/21/23  2216 01/22/23  1856 01/23/23  0623   WBC 10.0  --   --  7.7 7.2 HGB 9.9*   < > 9.0* 8.6* 8.5*   HCT 32.0*   < > 27.5* 26.6* 25.7*     --   --  209 187    < > = values in this interval not displayed. Recent Labs     01/21/23  0415 01/22/23  1856    134*   K 3.9 4.3    100   CO2 25 20*   BUN 16 7   CREATININE 0.7 0.4   CALCIUM 9.0 8.0*     Recent Labs     01/21/23  0415   AST 34   ALT 53   BILITOT 0.4   ALKPHOS 84     Recent Labs     01/21/23  1400   INR 1.09     No results for input(s): Juliette Raymond in the last 72 hours. Microbiology:    Blood culture #1:   Lab Results   Component Value Date/Time    BC No growth-preliminary 01/02/2022 03:29 PM    BC  01/02/2022 03:29 PM     possible contamination; clinical correlation required    BC No growth-preliminary No growth 01/02/2022 03:29 PM       Blood culture #2:No results found for: Sanjay Flores    Organism:  Lab Results   Component Value Date/Time    ORG Growth of Contaminants 01/21/2023 09:30 AM         Lab Results   Component Value Date/Time    LABGRAM  03/28/2022 12:53 PM     Rare segmented neutrophils observed. No epithelial cells observed. No bacteria seen. MRSA culture only:No results found for: [facility]    Urine culture:   Lab Results   Component Value Date/Time    LABURIN  11/17/2022 04:25 PM     No growth-preliminary Mixed growth. The mixture of organisms present represents both organisms that may cause urinary tract infections and organisms that are not a common cause of urinary tract infections and are possibly skin john or distal urethral john.           Respiratory culture: No results found for: CULTRESP    Aerobic and Anaerobic :  Lab Results   Component Value Date/Time    LABAERO Normal john 03/28/2022 12:53 PM     No results found for: LABANAE    Urinalysis:      Lab Results   Component Value Date/Time    NITRU NEGATIVE 01/21/2023 09:50 AM    WBCUA 10-15 01/21/2023 09:50 AM    BACTERIA NONE SEEN 01/21/2023 09:50 AM    RBCUA 0-2 01/21/2023 09:50 AM    BLOODU NEGATIVE 01/21/2023 09:50 AM    SPECGRAV 1.013 08/12/2022 12:30 PM    GLUCOSEU NEGATIVE 01/21/2023 09:50 AM       Radiology:  VL DUP LOWER EXTREMITY VENOUS BILATERAL   Final Result      1. Areas of abnormal echogenicity in the right posterior tibial vein, left greater saphenous, left peroneal and left anterior tibial veins suspicious for thrombi. **This report has been created using voice recognition software. It may contain minor errors which are inherent in voice recognition technology. **      Final report electronically signed by DR Lito Eaton on 1/21/2023 11:28 AM      CTA CHEST W WO CONTRAST   Final Result       1. Filling defects in the right lower lobe pulmonary arterial branches consistent with acute pulmonary emboli. No evidence of right ventricular strain. 2.. Atherosclerotic calcification in the thoracic aorta. 3. Borderline cardiomegaly. Calcification versus stent placement in the left anterior descending coronary artery. 4. Thickening off the interstitial lung markings. 5. Thoracic spondylosis. 6. Sclerotic density in the left eighth rib posteriorly. 7. Small hiatal hernia. 9. Results called to Cynthia Rankin   at 950 AM               **This report has been created using voice recognition software. It may contain minor errors which are inherent in voice recognition technology. **      Final report electronically signed by DR Lito Eaton on 1/21/2023 9:57 AM      XR CHEST (2 VW)   Final Result   Impression:   No acute cardiopulmonary. This document has been electronically signed by: Cecy Sanderson on    01/21/2023 04:56 AM      XR ABDOMEN (KUB) (SINGLE AP VIEW)   Final Result   Impression: No acute process. This document has been electronically signed by: Cecy Sanderson on    01/21/2023 04:58 AM        Electronically signed by MICHAEL Vale on 1/23/2023 at 2:29 PM

## 2023-01-23 NOTE — PLAN OF CARE
Problem: Discharge Planning  Goal: Discharge to home or other facility with appropriate resources  Outcome: Progressing  Note: Discharge needs are assessed daily     Problem: Skin/Tissue Integrity  Goal: Absence of new skin breakdown  Description: 1. Monitor for areas of redness and/or skin breakdown  2. Assess vascular access sites hourly  3. Every 4-6 hours minimum:  Change oxygen saturation probe site  4. Every 4-6 hours:  If on nasal continuous positive airway pressure, respiratory therapy assess nares and determine need for appliance change or resting period. Outcome: Progressing  Note: Skin is assessed daily, no new skin breakdown noted     Problem: Safety - Adult  Goal: Free from fall injury  Outcome: Progressing  Note: Patient free of falls this shift. Patient on falling star program. Fall band intact. RN visually checks on patient with hourly rounds. Patient tolerates ambulation each shift. Problem: Chronic Conditions and Co-morbidities  Goal: Patient's chronic conditions and co-morbidity symptoms are monitored and maintained or improved  Outcome: Progressing     Problem: Pain  Goal: Verbalizes/displays adequate comfort level or baseline comfort level  Outcome: Progressing  Note: Patient voiced pain this shift at 4/10. Patient's pain goal is 0/10. RN continues to deliver PRN medication and attempts noninvasive methods such as repositioning, ice packs, massage. Pain is re-assessed frequently. Bed alarm set after pain meds given. Problem: Metabolic/Fluid and Electrolytes - Adult  Goal: Glucose maintained within prescribed range  Outcome: Progressing  Note: Chem sticks being monitored ACHS. Oral and prescribed insulins being given as needed. Patient being monitored for hyper and hypoglycemia. Care plan reviewed with patient. Patient verbalizes understanding of the plan of care and contribute to goal setting.

## 2023-01-24 ENCOUNTER — APPOINTMENT (OUTPATIENT)
Dept: GENERAL RADIOLOGY | Age: 73
DRG: 176 | End: 2023-01-24
Payer: MEDICARE

## 2023-01-24 LAB
DEPRECATED MEAN GLUCOSE BLD GHB EST-ACNC: 105 MG/DL (ref 70–126)
GLUCOSE BLD STRIP.AUTO-MCNC: 161 MG/DL (ref 70–108)
GLUCOSE BLD STRIP.AUTO-MCNC: 211 MG/DL (ref 70–108)
GLUCOSE BLD STRIP.AUTO-MCNC: 238 MG/DL (ref 70–108)
HBA1C MFR BLD HPLC: 5.5 % (ref 4.4–6.4)
HEPARIN UNFRACTIONATED: 0.37 U/ML (ref 0.3–0.7)

## 2023-01-24 PROCEDURE — G0378 HOSPITAL OBSERVATION PER HR: HCPCS

## 2023-01-24 PROCEDURE — 99232 SBSQ HOSP IP/OBS MODERATE 35: CPT | Performed by: PHYSICIAN ASSISTANT

## 2023-01-24 PROCEDURE — 94760 N-INVAS EAR/PLS OXIMETRY 1: CPT

## 2023-01-24 PROCEDURE — 85520 HEPARIN ASSAY: CPT

## 2023-01-24 PROCEDURE — 6370000000 HC RX 637 (ALT 250 FOR IP): Performed by: PHYSICIAN ASSISTANT

## 2023-01-24 PROCEDURE — 2580000003 HC RX 258: Performed by: STUDENT IN AN ORGANIZED HEALTH CARE EDUCATION/TRAINING PROGRAM

## 2023-01-24 PROCEDURE — 96376 TX/PRO/DX INJ SAME DRUG ADON: CPT

## 2023-01-24 PROCEDURE — 6370000000 HC RX 637 (ALT 250 FOR IP): Performed by: STUDENT IN AN ORGANIZED HEALTH CARE EDUCATION/TRAINING PROGRAM

## 2023-01-24 PROCEDURE — 36415 COLL VENOUS BLD VENIPUNCTURE: CPT

## 2023-01-24 PROCEDURE — 6360000002 HC RX W HCPCS: Performed by: STUDENT IN AN ORGANIZED HEALTH CARE EDUCATION/TRAINING PROGRAM

## 2023-01-24 PROCEDURE — 83036 HEMOGLOBIN GLYCOSYLATED A1C: CPT

## 2023-01-24 PROCEDURE — 6360000002 HC RX W HCPCS: Performed by: PHYSICIAN ASSISTANT

## 2023-01-24 PROCEDURE — 6370000000 HC RX 637 (ALT 250 FOR IP): Performed by: FAMILY MEDICINE

## 2023-01-24 PROCEDURE — 82948 REAGENT STRIP/BLOOD GLUCOSE: CPT

## 2023-01-24 PROCEDURE — 2700000000 HC OXYGEN THERAPY PER DAY

## 2023-01-24 RX ADMIN — ASPIRIN 81 MG 81 MG: 81 TABLET ORAL at 09:06

## 2023-01-24 RX ADMIN — ONDANSETRON 4 MG: 2 INJECTION INTRAMUSCULAR; INTRAVENOUS at 18:50

## 2023-01-24 RX ADMIN — INSULIN GLARGINE 22 UNITS: 100 INJECTION, SOLUTION SUBCUTANEOUS at 22:29

## 2023-01-24 RX ADMIN — SIMETHICONE 80 MG: 80 TABLET, CHEWABLE ORAL at 11:35

## 2023-01-24 RX ADMIN — ROSUVASTATIN 10 MG: 10 TABLET, FILM COATED ORAL at 09:06

## 2023-01-24 RX ADMIN — RIVAROXABAN 15 MG: 15 TABLET, FILM COATED ORAL at 18:09

## 2023-01-24 RX ADMIN — SIMETHICONE 80 MG: 80 TABLET, CHEWABLE ORAL at 09:06

## 2023-01-24 RX ADMIN — SIMETHICONE 80 MG: 80 TABLET, CHEWABLE ORAL at 22:24

## 2023-01-24 RX ADMIN — SIMETHICONE 80 MG: 80 TABLET, CHEWABLE ORAL at 18:09

## 2023-01-24 RX ADMIN — RIVAROXABAN 15 MG: 15 TABLET, FILM COATED ORAL at 11:35

## 2023-01-24 RX ADMIN — INSULIN LISPRO 1 UNITS: 100 INJECTION, SOLUTION INTRAVENOUS; SUBCUTANEOUS at 12:48

## 2023-01-24 RX ADMIN — SODIUM CHLORIDE, PRESERVATIVE FREE 10 ML: 5 INJECTION INTRAVENOUS at 22:24

## 2023-01-24 RX ADMIN — HEPARIN SODIUM 12 UNITS/KG/HR: 10000 INJECTION, SOLUTION INTRAVENOUS at 08:17

## 2023-01-24 RX ADMIN — POLYETHYLENE GLYCOL 3350 17 G: 17 POWDER, FOR SOLUTION ORAL at 22:24

## 2023-01-24 RX ADMIN — METOPROLOL SUCCINATE 25 MG: 25 TABLET, EXTENDED RELEASE ORAL at 09:06

## 2023-01-24 NOTE — PLAN OF CARE
Problem: Discharge Planning  Goal: Discharge to home or other facility with appropriate resources  Outcome: Progressing  Note: Discharge needs are assessed daily     Problem: Skin/Tissue Integrity  Goal: Absence of new skin breakdown  Description: 1. Monitor for areas of redness and/or skin breakdown  2. Assess vascular access sites hourly  3. Every 4-6 hours minimum:  Change oxygen saturation probe site  4. Every 4-6 hours:  If on nasal continuous positive airway pressure, respiratory therapy assess nares and determine need for appliance change or resting period. Outcome: Progressing  Note: Skin is assessed daily, no new skin breakdown noted     Problem: Safety - Adult  Goal: Free from fall injury  Outcome: Progressing  Note: Patient free of falls this shift. Patient on falling star program. Fall band intact. RN visually checks on patient with hourly rounds. Patient tolerates ambulation each shift. Problem: Chronic Conditions and Co-morbidities  Goal: Patient's chronic conditions and co-morbidity symptoms are monitored and maintained or improved  Outcome: Progressing     Problem: Pain  Goal: Verbalizes/displays adequate comfort level or baseline comfort level  Outcome: Progressing  Note: Patient denies pain this shift    Problem: Metabolic/Fluid and Electrolytes - Adult  Goal: Glucose maintained within prescribed range  Outcome: Progressing  Note: Chem sticks being monitored ACHS. Oral and prescribed insulins being given as needed. Patient being monitored for hyper and hypoglycemia. Care plan reviewed with patient. Patient verbalizes understanding of the plan of care and contribute to goal setting.

## 2023-01-24 NOTE — PROGRESS NOTES
Hospitalist Progress Note      Patient:  Mau Coreas    Unit/Bed:8B-34/034-A  YOB: 1950  MRN: [id number]   Acct: [de-identified]   PCP: Ruthie Sue DO  Date of Admission: 1/21/2023    Assessment/Plan:    Acute PE/DVT: CTA chest: Filling defects in the right lower lobe pulmonary arterial branches consistent with acute pulmonary emboli. No evidence of right ventricular strain. US Duplex + for DVT Areas of abnormal echogenicity in the right posterior tibial vein, left greater saphenous, left peroneal and left anterior tibial veins suspicious for thrombi. Likely due to reduced mobility, walks few feet at home but since COVID, less active. IV Heparin drip transitioned to DOAC- pt did have blood in cannula, need to watch closely. Echo showed no acute issues. Bilateral LE edema: Likely due to DVTs. PT/OT. Essential HTN: BP controlled today. BP medications with parameters. CAD: s/p PCI in the history. Lipitor & ASA. IDDMII: HbA1c 8.8 on 11/17/22. On Lantus 44u nightly with Humalog at home. Will repeat A1c. Resume home Lantus with SSI. Monitor BG with inpatient goal 140-180. Hx COVID s/p tracheostomy: 12/2021-04/2022  Elevated troponin -- 0.021 -> 0.019 in ER -- likely due to PE -- no acute EKG changes -- check Echo - last echo 3/2022 with EF 60%, no WMA - stress 6/2022 non-diagnostic for ischemia, normal EF. Dispo: Monitor on [facility] [facility], hopeful for DC 1/25. Chief Complaint: Abdominal Bloating     Initial H and P:-    Initial H&P \"81 yo F with history of DM, HTN, COVID 12/2020 s/p tracheostomy 04/2022 follows with Dr. Pradeep Spangler, presented to [facility] ED for complaints of abdominal bloating with nausea/vomiting. Ed: afebrile, hemodynamically stable tachycardic, saturating well on 0.5 L trach mask. Labs notable for Trop 0.021 then 0.019 on repeat, leukocytosis 12.6. Ddimer elevation ~5000.  CTA chest: Filling defects in the right lower lobe pulmonary arterial branches consistent with acute pulmonary emboli. No evidence of right ventricular strain. CXR WNL. KUB with no acute process. US Duplex + for distal DVT Areas of abnormal echogenicity in the right posterior tibial vein, left greater saphenous, left peroneal and left anterior tibial veins suspicious for thrombi. Started on heparin gtt. Given reglan ang GI cocktal. Had a BM. Admitted for further management.      On eval, per patient and her  present at bedside, reports has been having issues with constipation and bloating last couple of days which got worse. Woke up with worsened abdominal bloating/distention with nausea and vomiting. Had a large BM overnight, feels slightly better since then. No subjective fever, chills, dizziness, diarrhea. Dyspnea was in the setting of weaning down her oxygen needs following a weaning protcol. No chest pain, palpitations, syncope. Follows with Dr. Garza. Plan for weaning off trach with a procedure on 02/09/2023.\"     1/22: No acute events overnight.  Patient denies any pain at this time.  Patient is alert and oriented and tolerating well.  Patient is on home oxygen on trach collar.    1/23: No acute events overnight. Pt is resting in bed with trach mask. Pt is alert and oriented. Pt is in good spirits.     Subjective (past 24 hours):   No acute events overnight.  Patient having blood within her trach cannula.  Patient denies hemoptysis or hematemesis.  Patient denies chest pain, shortness of breath.      Past medical history, family history, social history and allergies reviewed again and is unchanged since admission.    ROS (All review of systems completed.  Pertinent positives noted. Otherwise All other systems reviewed and negative.)     Medications:  Reviewed    Infusion Medications    sodium chloride      dextrose       Scheduled Medications    rivaroxaban  15 mg Oral BID WC    sodium chloride flush  5-40 mL IntraVENous 2 times per day     aspirin  81 mg Oral Daily    [Held by provider] furosemide  40 mg Oral Daily    [Held by provider] lisinopril  10 mg Oral Daily    metoprolol succinate  25 mg Oral Daily    rosuvastatin  10 mg Oral Daily    simethicone  80 mg Oral 4x Daily    insulin lispro  0-4 Units SubCUTAneous TID WC    insulin lispro  0-4 Units SubCUTAneous Nightly    insulin glargine  22 Units SubCUTAneous Nightly     PRN Meds: sodium chloride flush, sodium chloride, ondansetron **OR** ondansetron, polyethylene glycol, acetaminophen **OR** acetaminophen, albuterol, albuterol sulfate HFA, nitroGLYCERIN, glucose, dextrose bolus **OR** dextrose bolus, glucagon (rDNA), dextrose    No intake or output data in the 24 hours ending 01/24/23 1500      Diet:  ADULT DIET; Regular; 4 carb choices (60 gm/meal); Low Sodium (2 gm)    Physical Exam:  /64   Pulse (!) 107   Temp 98.8 °F (37.1 °C) (Oral)   Resp 20   Ht 5' 2\" (1.575 m)   Wt 174 lb 9.6 oz (79.2 kg)   LMP  (LMP Unknown)   SpO2 97%   BMI 31.93 kg/m²   General appearance: Chronically ill appearing white female. No apparent distress, appears stated age and cooperative. HEENT: Pupils equal, round, and reactive to light. Conjunctivae/corneas clear. Neck: Supple, with full range of motion. No jugular venous distention. Trach in place. Respiratory:  Normal respiratory effort on trach mask. Breath sounds diminished. Cardiovascular: Regular rate and rhythm with normal S1/S2 without murmurs, rubs or gallops. Abdomen: Soft, non-tender, non-distended with normal bowel sounds. Musculoskeletal: passive and active ROM x 4 extremities. Skin: Skin color, texture, turgor normal.  No rashes or lesions. Neurologic:  Neurovascularly intact without any focal sensory/motor deficits.  Cranial nerves: II-XII intact, grossly non-focal.  Psychiatric: Alert and oriented, thought content appropriate, normal insight  Capillary Refill: Brisk,< 3 seconds   Peripheral Pulses: +2 palpable, equal bilaterally Labs:   Recent Labs     01/21/23  2216 01/22/23  1856 01/23/23  0623   WBC  --  7.7 7.2   HGB 9.0* 8.6* 8.5*   HCT 27.5* 26.6* 25.7*   PLT  --  209 187     Recent Labs     01/22/23  1856   *   K 4.3      CO2 20*   BUN 7   CREATININE 0.4   CALCIUM 8.0*     No results for input(s): AST, ALT, BILIDIR, BILITOT, ALKPHOS in the last 72 hours. No results for input(s): INR in the last 72 hours. No results for input(s): Estle Carrow in the last 72 hours. Microbiology:    Blood culture #1:   Lab Results   Component Value Date/Time    BC No growth-preliminary 01/02/2022 03:29 PM    BC  01/02/2022 03:29 PM     possible contamination; clinical correlation required    BC No growth-preliminary No growth 01/02/2022 03:29 PM       Blood culture #2:No results found for: Florence Lara    Organism:  Lab Results   Component Value Date/Time    ORG Growth of Contaminants 01/21/2023 09:30 AM         Lab Results   Component Value Date/Time    LABGRAM  03/28/2022 12:53 PM     Rare segmented neutrophils observed. No epithelial cells observed. No bacteria seen. MRSA culture only:No results found for: [facility]    Urine culture:   Lab Results   Component Value Date/Time    LABURIN  11/17/2022 04:25 PM     No growth-preliminary Mixed growth. The mixture of organisms present represents both organisms that may cause urinary tract infections and organisms that are not a common cause of urinary tract infections and are possibly skin john or distal urethral john.           Respiratory culture: No results found for: CULTRESP    Aerobic and Anaerobic :  Lab Results   Component Value Date/Time    LABAERO Normal john 03/28/2022 12:53 PM     No results found for: LABANAE    Urinalysis:      Lab Results   Component Value Date/Time    NITRU NEGATIVE 01/21/2023 09:50 AM    WBCUA 10-15 01/21/2023 09:50 AM    BACTERIA NONE SEEN 01/21/2023 09:50 AM    RBCUA 0-2 01/21/2023 09:50 AM    BLOODU NEGATIVE 01/21/2023 09:50 AM SPECGRAV 1.013 08/12/2022 12:30 PM    GLUCOSEU NEGATIVE 01/21/2023 09:50 AM       Radiology:  VL DUP LOWER EXTREMITY VENOUS BILATERAL   Final Result      1. Areas of abnormal echogenicity in the right posterior tibial vein, left greater saphenous, left peroneal and left anterior tibial veins suspicious for thrombi.         **This report has been created using voice recognition software. It may contain minor errors which are inherent in voice recognition technology.**      Final report electronically signed by DR SULY DE JESUS on 1/21/2023 11:28 AM      CTA CHEST W WO CONTRAST   Final Result       1. Filling defects in the right lower lobe pulmonary arterial branches consistent with acute pulmonary emboli. No evidence of right ventricular strain.   2.. Atherosclerotic calcification in the thoracic aorta.   3. Borderline cardiomegaly. Calcification versus stent placement in the left anterior descending coronary artery.   4. Thickening off the interstitial lung markings.   5. Thoracic spondylosis.   6. Sclerotic density in the left eighth rib posteriorly.   7. Small hiatal hernia.   9. Results called to Jacky Keene   at 950 AM               **This report has been created using voice recognition software. It may contain minor errors which are inherent in voice recognition technology.**      Final report electronically signed by DR SULY DE JESUS on 1/21/2023 9:57 AM      XR CHEST (2 VW)   Final Result   Impression:   No acute cardiopulmonary.      This document has been electronically signed by: Sully Medeiros DO on    01/21/2023 04:56 AM      XR ABDOMEN (KUB) (SINGLE AP VIEW)   Final Result   Impression: No acute process.      This document has been electronically signed by: Sully Medeiros DO on    01/21/2023 04:58 AM        Electronically signed by MICHAEL Sosa on 1/24/2023 at 3:00 PM

## 2023-01-24 NOTE — PROGRESS NOTES
IV team to room to assess PICC line. Dressing changed and PICC cleansed. External catheter length noted to be at 7cm. Upon insertion on 8/11/22 external catheter length charted at 0cm. Unable to draw blood from both lumen of PICC line. Suggested CXR to verify placement of PICC tip.

## 2023-01-24 NOTE — PROGRESS NOTES
VS  /76  P 108  RR 18  T 99.1  O2 98% trach mask at 5L  FSBS 161    Eyes equal, reactive, conjunctivae normal  Pupils L 2+, R 2+  GCS score 15    Alert and oriented to person, place and time  Denies pain  Heart rhythm is normal  Breath sounds normal, no wheezing noted  Heart sounds, regular. Per chart patient does have a murmur. Not heard on assessment. Edema: 2+ pitting edema to bilat lower extremities  Breath sounds are normal  Respiratory effort is normal, unlabored  Lung sounds clear, diminished  Bowel sounds active x 4 quads. Abdomen soft. Patients states there is little discomfort when palpated. No rebound or guarding  No BM since she has been here, according to chart pt was having issues with this before admittance to the hospital    Pt is continent of bladder and bowel  Walks to restroom using a walker and stand by assist    Trach present and intact. Pt changes her own inner canula and does own trach care. Discharge on old canula was noted to be red. Pt is currently on a heparin drip for DVT's and PE. T-sponge changed per nurse. Drainage on sponge was red tinged in appearance. Cap refill <3  Skin is warm, dry and intact. No open areas noted. PICC line on upper left arm. There is no access to this PICC since admission. IV to right forearm.  Heparin in on a continuous drip  Lázaro scale 19    Hand grasps strong and equal  Pulses strong and equal  Pedal pulses equal and strong  Pedal push and pull equal and strong

## 2023-01-24 NOTE — CARE COORDINATION
1/24/23, 11:07 AM EST    DISCHARGE PLANNING EVALUATION    Received a call from Lo at [facility] asking for an update on Christina Sukhdevkumar. SERVANDO did confirm that they were current with her for RN and PT services. Lo asked for updates to be faxed to [phone]. For discharge, please call [phone] or Monserrat's personal cell: [phone]. DISCHARGE PLANNING EVALUATION  1/24/23, 12:26 PM EST    Reason for Referral: Current HH  Mental Status: Answered questions appropriately  Decision Making: Independent  Family/Social/Home Environment: Lives at home with   Current Services including food security, transportation and housekeeping: Current [facility] for RN and PT. Confirmed services with Lo at [facility]. She is independent, does not drive  Current Equipment: Permanent trach, walker, wheelchair  Payment Source: Medicare  Concerns or Barriers to Discharge: None  Post-acute [facility]) provider list was provided to patient. Patient was informed of their freedom to choose [facility] provider. Discussed and offered to show the patient the relevant [facility] Providers quality and resource use measures on Medicare Compare web site via computer based on patient's goals of care and treatment preferences. Questions regarding selection process were answered. Teach Back Method used with Christina Suero regarding care plan and discharge planning  Patient verbalize understanding of the plan of care and contribute to goal setting. Patient goals, treatment preferences and discharge plan: return to home with spouse and current [facility] for RN and PT services.      Electronically signed by KIARRA Workman on 1/24/2023 at 12:26 PM     Update 1:52 PM- Faxed H&P and face sheet to [facility].

## 2023-01-24 NOTE — PLAN OF CARE
Problem: Discharge Planning  Goal: Discharge to home or other facility with appropriate resources  Outcome: Progressing       Consult received. Please see SW note dated 1/24.

## 2023-01-24 NOTE — CARE COORDINATION
1/24/23, 3:13 PM EST    DISCHARGE ON GOING EVALUATION    [facility] day: 0  Location: 8B-34/034-A Reason for admit: Abdominal bloating [R14.0]  Tachycardia [R00.0]  Other acute pulmonary embolism without acute cor pulmonale (HCC) [I26.99]  Pulmonary embolism, unspecified chronicity, unspecified pulmonary embolism type, unspecified whether acute cor pulmonale present ([address] [I26.99]  Nausea and vomiting, unspecified vomiting type [R11.2]   Procedure: No.  Barriers to Discharge: Cont on Heparin gtt. (Planning 72 hrs on gtt). Will need transition to oral anticoagulant. Echo completed. PCP: Lillie Joy, DO  Patient Goals/Plan/Treatment Preferences: Planning return home with spouse.

## 2023-01-25 ENCOUNTER — APPOINTMENT (OUTPATIENT)
Dept: GENERAL RADIOLOGY | Age: 73
DRG: 176 | End: 2023-01-25
Payer: MEDICARE

## 2023-01-25 PROBLEM — I26.99 PE (PULMONARY THROMBOEMBOLISM) (HCC): Status: ACTIVE | Noted: 2023-01-25

## 2023-01-25 LAB
ANION GAP SERPL CALC-SCNC: 10 MEQ/L (ref 8–16)
BUN SERPL-MCNC: 4 MG/DL (ref 7–22)
CALCIUM SERPL-MCNC: 8.5 MG/DL (ref 8.5–10.5)
CHLORIDE SERPL-SCNC: 102 MEQ/L (ref 98–111)
CO2 SERPL-SCNC: 25 MEQ/L (ref 23–33)
CREAT SERPL-MCNC: 0.6 MG/DL (ref 0.4–1.2)
DEPRECATED RDW RBC AUTO: 54.6 FL (ref 35–45)
ERYTHROCYTE [DISTWIDTH] IN BLOOD BY AUTOMATED COUNT: 15.4 % (ref 11.5–14.5)
GFR SERPL CREATININE-BSD FRML MDRD: > 60 ML/MIN/1.73M2
GLUCOSE BLD STRIP.AUTO-MCNC: 162 MG/DL (ref 70–108)
GLUCOSE BLD STRIP.AUTO-MCNC: 189 MG/DL (ref 70–108)
GLUCOSE BLD STRIP.AUTO-MCNC: 192 MG/DL (ref 70–108)
GLUCOSE BLD STRIP.AUTO-MCNC: 210 MG/DL (ref 70–108)
GLUCOSE SERPL-MCNC: 165 MG/DL (ref 70–108)
HCT VFR BLD AUTO: 26.1 % (ref 37–47)
HGB BLD-MCNC: 8.7 GM/DL (ref 12–16)
MCH RBC QN AUTO: 32.5 PG (ref 26–33)
MCHC RBC AUTO-ENTMCNC: 33.3 GM/DL (ref 32.2–35.5)
MCV RBC AUTO: 97.4 FL (ref 81–99)
PLATELET # BLD AUTO: 197 THOU/MM3 (ref 130–400)
PMV BLD AUTO: 9.8 FL (ref 9.4–12.4)
POTASSIUM SERPL-SCNC: 4 MEQ/L (ref 3.5–5.2)
RBC # BLD AUTO: 2.68 MILL/MM3 (ref 4.2–5.4)
SODIUM SERPL-SCNC: 137 MEQ/L (ref 135–145)
WBC # BLD AUTO: 4.7 THOU/MM3 (ref 4.8–10.8)

## 2023-01-25 PROCEDURE — 02HV33Z INSERTION OF INFUSION DEVICE INTO SUPERIOR VENA CAVA, PERCUTANEOUS APPROACH: ICD-10-PCS | Performed by: FAMILY MEDICINE

## 2023-01-25 PROCEDURE — 36569 INSJ PICC 5 YR+ W/O IMAGING: CPT

## 2023-01-25 PROCEDURE — 99233 SBSQ HOSP IP/OBS HIGH 50: CPT

## 2023-01-25 PROCEDURE — 97530 THERAPEUTIC ACTIVITIES: CPT

## 2023-01-25 PROCEDURE — 6370000000 HC RX 637 (ALT 250 FOR IP): Performed by: PHYSICIAN ASSISTANT

## 2023-01-25 PROCEDURE — C1751 CATH, INF, PER/CENT/MIDLINE: HCPCS

## 2023-01-25 PROCEDURE — 82948 REAGENT STRIP/BLOOD GLUCOSE: CPT

## 2023-01-25 PROCEDURE — 76937 US GUIDE VASCULAR ACCESS: CPT

## 2023-01-25 PROCEDURE — G0378 HOSPITAL OBSERVATION PER HR: HCPCS

## 2023-01-25 PROCEDURE — 85027 COMPLETE CBC AUTOMATED: CPT

## 2023-01-25 PROCEDURE — 97535 SELF CARE MNGMENT TRAINING: CPT

## 2023-01-25 PROCEDURE — 6370000000 HC RX 637 (ALT 250 FOR IP)

## 2023-01-25 PROCEDURE — 94760 N-INVAS EAR/PLS OXIMETRY 1: CPT

## 2023-01-25 PROCEDURE — 1200000000 HC SEMI PRIVATE

## 2023-01-25 PROCEDURE — 2580000003 HC RX 258: Performed by: STUDENT IN AN ORGANIZED HEALTH CARE EDUCATION/TRAINING PROGRAM

## 2023-01-25 PROCEDURE — 97166 OT EVAL MOD COMPLEX 45 MIN: CPT

## 2023-01-25 PROCEDURE — 6370000000 HC RX 637 (ALT 250 FOR IP): Performed by: FAMILY MEDICINE

## 2023-01-25 PROCEDURE — 80048 BASIC METABOLIC PNL TOTAL CA: CPT

## 2023-01-25 PROCEDURE — 2580000003 HC RX 258

## 2023-01-25 PROCEDURE — 71045 X-RAY EXAM CHEST 1 VIEW: CPT

## 2023-01-25 PROCEDURE — 6370000000 HC RX 637 (ALT 250 FOR IP): Performed by: STUDENT IN AN ORGANIZED HEALTH CARE EDUCATION/TRAINING PROGRAM

## 2023-01-25 PROCEDURE — 36415 COLL VENOUS BLD VENIPUNCTURE: CPT

## 2023-01-25 PROCEDURE — 2700000000 HC OXYGEN THERAPY PER DAY

## 2023-01-25 PROCEDURE — 74018 RADEX ABDOMEN 1 VIEW: CPT

## 2023-01-25 PROCEDURE — L8501 TRACHEOSTOMY SPEAKING VALVE: HCPCS

## 2023-01-25 RX ORDER — DOCUSATE SODIUM 100 MG/1
100 CAPSULE, LIQUID FILLED ORAL 2 TIMES DAILY
Status: DISCONTINUED | OUTPATIENT
Start: 2023-01-25 | End: 2023-01-27

## 2023-01-25 RX ORDER — METOPROLOL SUCCINATE 50 MG/1
50 TABLET, EXTENDED RELEASE ORAL DAILY
Status: DISCONTINUED | OUTPATIENT
Start: 2023-01-26 | End: 2023-01-25

## 2023-01-25 RX ORDER — SODIUM CHLORIDE 0.9 % (FLUSH) 0.9 %
5-40 SYRINGE (ML) INJECTION PRN
Status: DISCONTINUED | OUTPATIENT
Start: 2023-01-25 | End: 2023-01-29 | Stop reason: HOSPADM

## 2023-01-25 RX ORDER — SODIUM CHLORIDE 9 MG/ML
25 INJECTION, SOLUTION INTRAVENOUS PRN
Status: DISCONTINUED | OUTPATIENT
Start: 2023-01-25 | End: 2023-01-29 | Stop reason: HOSPADM

## 2023-01-25 RX ORDER — POLYETHYLENE GLYCOL 3350 17 G/17G
17 POWDER, FOR SOLUTION ORAL DAILY
Status: DISCONTINUED | OUTPATIENT
Start: 2023-01-25 | End: 2023-01-29 | Stop reason: HOSPADM

## 2023-01-25 RX ORDER — SODIUM CHLORIDE 9 MG/ML
INJECTION, SOLUTION INTRAVENOUS CONTINUOUS
Status: DISCONTINUED | OUTPATIENT
Start: 2023-01-25 | End: 2023-01-26

## 2023-01-25 RX ORDER — LIDOCAINE HYDROCHLORIDE 10 MG/ML
5 INJECTION, SOLUTION EPIDURAL; INFILTRATION; INTRACAUDAL; PERINEURAL ONCE
Status: DISCONTINUED | OUTPATIENT
Start: 2023-01-25 | End: 2023-01-29 | Stop reason: HOSPADM

## 2023-01-25 RX ORDER — ALBUTEROL SULFATE 2.5 MG/3ML
2.5 SOLUTION RESPIRATORY (INHALATION) EVERY 4 HOURS PRN
Status: DISCONTINUED | OUTPATIENT
Start: 2023-01-25 | End: 2023-01-29 | Stop reason: HOSPADM

## 2023-01-25 RX ORDER — METOPROLOL SUCCINATE 25 MG/1
25 TABLET, EXTENDED RELEASE ORAL DAILY
Status: DISCONTINUED | OUTPATIENT
Start: 2023-01-26 | End: 2023-01-29 | Stop reason: HOSPADM

## 2023-01-25 RX ORDER — SODIUM CHLORIDE 0.9 % (FLUSH) 0.9 %
5-40 SYRINGE (ML) INJECTION EVERY 12 HOURS SCHEDULED
Status: DISCONTINUED | OUTPATIENT
Start: 2023-01-25 | End: 2023-01-29 | Stop reason: HOSPADM

## 2023-01-25 RX ORDER — SENNA PLUS 8.6 MG/1
1 TABLET ORAL NIGHTLY
Status: DISCONTINUED | OUTPATIENT
Start: 2023-01-25 | End: 2023-01-29 | Stop reason: HOSPADM

## 2023-01-25 RX ADMIN — ASPIRIN 81 MG 81 MG: 81 TABLET ORAL at 09:11

## 2023-01-25 RX ADMIN — SIMETHICONE 80 MG: 80 TABLET, CHEWABLE ORAL at 13:05

## 2023-01-25 RX ADMIN — POLYETHYLENE GLYCOL 3350 17 G: 17 POWDER, FOR SOLUTION ORAL at 15:37

## 2023-01-25 RX ADMIN — ROSUVASTATIN 10 MG: 10 TABLET, FILM COATED ORAL at 09:07

## 2023-01-25 RX ADMIN — RIVAROXABAN 15 MG: 15 TABLET, FILM COATED ORAL at 09:07

## 2023-01-25 RX ADMIN — RIVAROXABAN 15 MG: 15 TABLET, FILM COATED ORAL at 17:49

## 2023-01-25 RX ADMIN — SODIUM CHLORIDE, PRESERVATIVE FREE 10 ML: 5 INJECTION INTRAVENOUS at 09:07

## 2023-01-25 RX ADMIN — INSULIN GLARGINE 22 UNITS: 100 INJECTION, SOLUTION SUBCUTANEOUS at 23:04

## 2023-01-25 RX ADMIN — SIMETHICONE 80 MG: 80 TABLET, CHEWABLE ORAL at 09:07

## 2023-01-25 RX ADMIN — SODIUM CHLORIDE, PRESERVATIVE FREE 10 ML: 5 INJECTION INTRAVENOUS at 09:08

## 2023-01-25 RX ADMIN — SODIUM CHLORIDE, PRESERVATIVE FREE 10 ML: 5 INJECTION INTRAVENOUS at 22:39

## 2023-01-25 RX ADMIN — ONDANSETRON 4 MG: 4 TABLET, ORALLY DISINTEGRATING ORAL at 09:07

## 2023-01-25 RX ADMIN — SENNOSIDES 8.6 MG: 8.6 TABLET, FILM COATED ORAL at 22:38

## 2023-01-25 RX ADMIN — SIMETHICONE 80 MG: 80 TABLET, CHEWABLE ORAL at 22:38

## 2023-01-25 RX ADMIN — DOCUSATE SODIUM 100 MG: 100 CAPSULE, LIQUID FILLED ORAL at 22:38

## 2023-01-25 RX ADMIN — METOPROLOL SUCCINATE 25 MG: 25 TABLET, EXTENDED RELEASE ORAL at 09:07

## 2023-01-25 RX ADMIN — SODIUM CHLORIDE: 9 INJECTION, SOLUTION INTRAVENOUS at 22:39

## 2023-01-25 RX ADMIN — DOCUSATE SODIUM 100 MG: 100 CAPSULE, LIQUID FILLED ORAL at 15:37

## 2023-01-25 RX ADMIN — SIMETHICONE 80 MG: 80 TABLET, CHEWABLE ORAL at 17:49

## 2023-01-25 ASSESSMENT — PAIN SCALES - GENERAL: PAINLEVEL_OUTOF10: 4

## 2023-01-25 ASSESSMENT — PAIN DESCRIPTION - LOCATION: LOCATION: BUTTOCKS

## 2023-01-25 ASSESSMENT — PAIN DESCRIPTION - FREQUENCY: FREQUENCY: CONTINUOUS

## 2023-01-25 ASSESSMENT — PAIN DESCRIPTION - PAIN TYPE: TYPE: ACUTE PAIN

## 2023-01-25 ASSESSMENT — PAIN DESCRIPTION - ORIENTATION: ORIENTATION: LOWER

## 2023-01-25 ASSESSMENT — PAIN - FUNCTIONAL ASSESSMENT: PAIN_FUNCTIONAL_ASSESSMENT: ACTIVITIES ARE NOT PREVENTED

## 2023-01-25 ASSESSMENT — PAIN DESCRIPTION - ONSET: ONSET: ON-GOING

## 2023-01-25 ASSESSMENT — PAIN DESCRIPTION - DESCRIPTORS: DESCRIPTORS: ACHING;SORE

## 2023-01-25 NOTE — PROGRESS NOTES
PICC Procedure Note    Ramses Randolph   Admitted- 1/21/2023  2:19 AM  Admission diagnosis- Abdominal bloating [R14.0]  Tachycardia [R00.0]  Other acute pulmonary embolism without acute cor pulmonale (HCC) [I26.99]  Pulmonary embolism, unspecified chronicity, unspecified pulmonary embolism type, unspecified whether acute cor pulmonale present ([address] [I26.99]  Nausea and vomiting, unspecified vomiting type [R11.2]      Attending Physician- WYATT Leyva - *  Ordering Physician-same  Indication for Insertion: Poor Vascular Access    Catheter Insertion Date- 1/25/2023   Lot Number- [id number]  Gauge-4  Lumen-single    Insertion Site- JANETH Brachial  Vein Diameter- 1.62 cm  Catheter Length- 38 cm  Internal Length- 38 cm  Exposed Catheter Length- 0cm   Upper Arm Circumference- 30cm  Tip Confirmation System Bundle met- No: chest xray needed  If X-ray required, Tip Location- [place]  Radiologist- Dr Malina Odell    PICC insertion successful- yes  Ultrasound- yes    Okay To Use PICC- yes    Electronically signed by Max Fletcher, RN, RN on 1/25/2023 at 5:12 PM

## 2023-01-25 NOTE — PROGRESS NOTES
Hospitalist Progress Note    Patient:  Lloyd Ryan      Unit/Bed:8B-34/034-A    YOB: 1950    MRN: [id number]       Acct: [de-identified]     PCP: Angy Bray DO    Date of Admission: 1/21/2023    Assessment/Plan:    Acute PE/DVT: CTA chest: Filling defects in the right lower lobe pulmonary arterial branches consistent with acute pulmonary emboli. No evidence of right ventricular strain. US Duplex + for DVT Areas of abnormal echogenicity in the right posterior tibial vein, left greater saphenous, left peroneal and left anterior tibial veins suspicious for thrombi. Likely due to reduced mobility, walks few feet at home but since COVID, less active. IV Heparin drip transitioned to DOAC- pt did have blood in cannula, need to watch closely. Echo showed no acute issues (see # 11). Bilateral LE edema: Likely due to DVTs. PT/OT. Abdominal pain and bloating, n/v -- likely due to constipation - KUB i notable for mild gaseous distention of the stomach and scattered gas in the colon, moderate amount of fecal material in the colon, no abnormally dilated bowel loops. -- ?gastroparesis --bowel regimen: Colace, senna, MiraLAX. Hypotension (POA) -- BP low in ER 79/57 in ER and HR up 128 -- most likely due to hypovolemia with decreased po, lasix use and with BP meds --> improved with IVF. Will hold further IVF with BLE edema issues -- cont home lisinopril, toprol with parameters and monitor closely -- consider further IVF if recurs-- ?due to adrenal insufficiency with recent tapering of steroids over last couple months  -- may need stress dose steroids if BP worsens again. -- less likely due to PE as no RV strain on CT - Echo okay.    Sinus tachycardia -- chronic component also per pt -  in ER -- improved with IVF - likely due to combo of hypovolemia and PE -- continue Toprol > increase to 50 mg -- monitor tele  Leukocytosis -- 12.6 initially in ER with repeat 4 hrs later WNL-- afeb - monitor with above - Suspect likely reactive to dehydration. Acute on chronic macrocytic anemia --??dry on initial presentation as hgb 12.1 and s/p IVF down to 9.9 -- prior in 8/2022 was 13 but iin 6/2022 was 9-10's -- repeat as now on heparin gtt and with abd pain monitor for any GIB. ? check B12/folate  Essential HTN: BP controlled today. Continue Troprol. Hold Lisinopril given relative hypotension. Hold lasixBP medications with parameters. CAD: s/p PCI in the history. Lipitor & ASA. IDDMII: HbA1c 5.5 (1/24/23). On Lantus 44u nightly with Humalog at home, however this was for when she was on steroids. Decrease lantus to 22 units nightly which is 1/2 home dose and monitor BS closely with decreased po intake and only on clears -- cont SSI -- ?need decrease as pt was on long term steroids and have been weaned off - cont monitor closely Monitor BG with inpatient goal 140-180. Chronic respiratory failure with tracheostomy, tracheal stenosis -- at baseline -- monitor with #1 -- following with ENT Dr. Mike Estrada and plan for surgery 2/9/23 but may need to be delayed   Hx COVID s/p tracheostomy: 12/2021-04/2022  Elevated troponin -- 0.021 -> 0.019 in ER -- likely due to PE -- no acute EKG changes -- check Echo 1/23/23: Notable for EF 60-65%, no WMA, G1DD, RVSP 40 mmHg - last echo 3/2022 with EF 60%, no WMA - stress 6/2022 non-diagnostic for ischemia, normal EF.  CAD: with history of stent -- ASA, BB, statin - trop min elevated on admission - trend - EKG no acute changes   Hyperlipidemia: Statin  Obesity: BMI 31.93 kg/m². Discussed and educated on lifestyle modifications.       Expected discharge date: Pending clinical course    Disposition:    [x] Home       [] TCU       [] Rehab       [] Psych       [] SNF       [] [place]       [] Other-    Chief Complaint: Abdominal bloating    Hospital Course: Per HPI documented 1/21/2023: \"81 yo F with history of DM, HTN, COVID 12/2020 s/p tracheostomy 04/2022 follows with Dr. Doss, presented to [facility] ED for complaints of abdominal bloating with nausea/vomiting. Ed: afebrile, hemodynamically stable tachycardic, saturating well on 0.5 L trach mask. Labs notable for Trop 0.021 then 0.019 on repeat, leukocytosis 12.6. Ddimer elevation ~5000. CTA chest: Filling defects in the right lower lobe pulmonary arterial branches consistent with acute pulmonary emboli. No evidence of right ventricular strain. CXR WNL. KUB with no acute process. US Duplex + for distal DVT Areas of abnormal echogenicity in the right posterior tibial vein, left greater saphenous, left peroneal and left anterior tibial veins suspicious for thrombi. Started on heparin gtt. Given reglan ang GI cocktal. Had a BM. Admitted for further management. On eval, per patient and her  present at bedside, reports has been having issues with constipation and bloating last couple of days which got worse. Woke up with worsened abdominal bloating/distention with nausea and vomiting. Had a large BM overnight, feels slightly better since then. No subjective fever, chills, dizziness, diarrhea. Dyspnea was in the setting of weaning down her oxygen needs following a weaning protcol. No chest pain, palpitations, syncope. Follows with Dr. Doss. Plan for weaning off trach with a procedure on 02/09/2023. \"    1/25/23: Resumed care of patient today. She is sitting up in bedside recliner, nontoxic in appearance, no apparent distress. Remains afebrile, satting 100% on room air, hemodynamically stable. Patient stating that she is still having an intermittent cough. States that at times it is blood-tinged but it is more clear today. She did remove her inner cannula and it was noted to have dried blood on it. Patient states she has not cleaned her cannula since yesterday. We will clean cannula, monitor overnight. Consider switching back to heparin.   May need to consult Dr. Doss and ENT for further recommendations. Patient also stating she has not had a bowel movement yet in 5 days. States she is passing lots of gas but feels nauseous and mildly bloated today. Will obtain repeat KUB and start her on bowel regimen. Keep patient overnight for closer monitoring of minimal blood in trach tube. Additionally patient has a PICC line has been in place since August of last year. Through the notes it appears that patient is very hard stick gets weekly lab draws and per note from Dr. Daron Payne in 8/2022 wanted patient to have her PICC line given that she would need recurrent procedures. X-ray showing the PICC line is not an appropriate place, will need to be removed and replaced for lab draws. We will keep her on IV fluids at [facility] rate to keep PICC line open while in hospital.      Subjective (past 24 hours):      Denies chest pains, shortness of breath at rest, ROSA, abdominal pain, nausea, vomiting, diarrhea, fever, chills.     Medications:  Reviewed    Infusion Medications    sodium chloride      sodium chloride 25 mL/hr at 01/25/23 0029    dextrose       Scheduled Medications    docusate sodium  100 mg Oral BID    senna  1 tablet Oral Nightly    polyethylene glycol  17 g Oral Daily    lidocaine 1 % injection  5 mL IntraDERmal Once    sodium chloride flush  5-40 mL IntraVENous 2 times per day    rivaroxaban  15 mg Oral BID WC    aspirin  81 mg Oral Daily    [Held by provider] furosemide  40 mg Oral Daily    [Held by provider] lisinopril  10 mg Oral Daily    metoprolol succinate  25 mg Oral Daily    rosuvastatin  10 mg Oral Daily    simethicone  80 mg Oral 4x Daily    insulin lispro  0-4 Units SubCUTAneous TID     insulin lispro  0-4 Units SubCUTAneous Nightly    insulin glargine  22 Units SubCUTAneous Nightly     PRN Meds: sodium chloride flush, sodium chloride, albuterol, ondansetron **OR** ondansetron, polyethylene glycol, acetaminophen **OR** acetaminophen, albuterol sulfate HFA, nitroGLYCERIN, glucose, dextrose bolus **OR** dextrose bolus, glucagon (rDNA), dextrose      Intake/Output Summary (Last 24 hours) at 1/25/2023 2305  Last data filed at 1/25/2023 1514  Gross per 24 hour   Intake 540 ml   Output --   Net 540 ml       Diet:  ADULT DIET; Regular; 4 carb choices (60 gm/meal); Low Sodium (2 gm)    Exam:  /78   Pulse (!) 103   Temp 98.2 °F (36.8 °C) (Oral)   Resp 18   Ht 5' 2\" (1.575 m)   Wt 174 lb 9.6 oz (79.2 kg)   LMP  (LMP Unknown)   SpO2 99%   BMI 31.93 kg/m²     General appearance: No apparent distress, appears older than stated age and cooperative. Chronically ill in appearance. Nonverbal due to not having Passy-Troy valve, but can communicate through writing and mouthing words. HEENT: Pupils equal, round, and reactive to light. Conjunctivae/corneas clear. Neck: Supple, with full range of motion. No jugular venous distention. Trachea midline. Respiratory:  Normal respiratory effort. Clear to auscultation, bilaterally without Rales/Wheezes/Rhonchi. Cardiovascular: Fast rate and regular rhythm with normal S1/S2 without murmurs, rubs or gallops. Abdomen: Soft, non-tender, non-distended with normal bowel sounds. Musculoskeletal: passive and active ROM x 4 extremities. Skin: Skin color, texture, turgor normal.  No rashes or lesions. Neurologic:  Neurovascularly intact without any focal sensory/motor deficits. Cranial nerves: II-XII intact, grossly non-focal.  Psychiatric: Alert and oriented, thought content appropriate, normal insight  Capillary Refill: Brisk,< 3 seconds   Peripheral Pulses: +2 palpable, equal bilaterally       Labs:   Recent Labs     01/23/23  0623 01/25/23  0600   WBC 7.2 4.7*   HGB 8.5* 8.7*   HCT 25.7* 26.1*    197     Recent Labs     01/25/23  1820      K 4.0      CO2 25   BUN 4*   CREATININE 0.6   CALCIUM 8.5     No results for input(s): AST, ALT, BILIDIR, BILITOT, ALKPHOS in the last 72 hours.   No results for input(s): INR in the last 72 hours. No results for input(s): Mitchel Gubler in the last 72 hours. Microbiology:      Urinalysis:      Lab Results   Component Value Date/Time    NITRU NEGATIVE 01/21/2023 09:50 AM    WBCUA 10-15 01/21/2023 09:50 AM    BACTERIA NONE SEEN 01/21/2023 09:50 AM    RBCUA 0-2 01/21/2023 09:50 AM    BLOODU NEGATIVE 01/21/2023 09:50 AM    SPECGRAV 1.013 08/12/2022 12:30 PM    GLUCOSEU NEGATIVE 01/21/2023 09:50 AM       Radiology:  XR CHEST (2 VW)    Result Date: 1/21/2023  Portable semierect AP upright and lateral chest views. Comparison: CTA chest 4/13/2022. 3/27/2022 2 view chest. Findings: Cardiac silhouette projects enlarged part of which is due to prominent mediastinal fat on comparison CTA chest. No pulmonary venous congestion. No consolidation, pneumothorax, or defined pleural effusion. Left upper extremity PICC line terminates at the superior vena cava. Proximal tracheostomy tube. No acute bony abnormality. Impression: No acute cardiopulmonary. This document has been electronically signed by: Sharon Caceres on 01/21/2023 04:56 AM    XR ABDOMEN (KUB) (SINGLE AP VIEW)    Result Date: 1/21/2023  Supine AP abdomen. Comparison: 1/10/2022 CT abdomen pelvis with contrast. Findings: Mild gaseous intestinal tract. No bowel obstruction. No free air. No apparent organomegaly. Properitoneal flank stripes are visualized. Psoas shadows are partially obscured due to technical factors. Chronic degenerative changes in the spine with levocurvature. Impression: No acute process. This document has been electronically signed by: Sharon Caceres on 01/21/2023 04:58 AM    CTA CHEST W WO CONTRAST    Result Date: 1/21/2023  PROCEDURE: CTA CHEST W WO CONTRAST CLINICAL INFORMATION: PE study Elevated D-dimer  tachycardia. COMPARISON: CTA chest dated 6 June 2022. . TECHNIQUE: 3 mm axial images were obtained through the chest after the administration of IV contrast.  A non-contrast localizer was obtained.   3D reconstructions were performed on the scanner to include MIP coronal and sagittal images through the chest. Isovue was the intravenous contrast utilized. All CT scans at this facility use dose modulation, iterative reconstruction, and/or weight-based dosing when appropriate to reduce radiation dose to as low as reasonably achievable. FINDINGS: There are filling defects in the right lower lobe pulmonary arterial branches consistent with pulmonary emboli. The left pulmonary artery appears be normal. There is mild dilatation of the main pulmonary artery. There is a tracheotomy tube in place. .  There is atherosclerotic calcification in the aortic arch. . There is borderline cardiomegaly. There is no evidence for right ventricular strain. There is increased attenuation in the left anterior descending coronary artery which could represent calcification versus stent placement. There is no pericardial or pleural effusion. There is no mediastinal, axillary or hilar adenopathy. There is thickening off the interstitial lung markings. . There are no infiltrates. There is mild thoracic spondylosis. . There is a sclerotic density in the left eighth rib posteriorly. There is a small hiatal hernia. .      1. Filling defects in the right lower lobe pulmonary arterial branches consistent with acute pulmonary emboli. No evidence of right ventricular strain. 2.. Atherosclerotic calcification in the thoracic aorta. 3. Borderline cardiomegaly. Calcification versus stent placement in the left anterior descending coronary artery. 4. Thickening off the interstitial lung markings. 5. Thoracic spondylosis. 6. Sclerotic density in the left eighth rib posteriorly. 7. Small hiatal hernia. 9. Results called to Kennedy Thompson   at 950 AM **This report has been created using voice recognition software. It may contain minor errors which are inherent in voice recognition technology. ** Final report electronically signed by DR Carol Vanegas on 1/21/2023 9: 62 AM     DUP LOWER EXTREMITY VENOUS BILATERAL    Result Date: 1/21/2023  PROCEDURE:  DUP LOWER EXTREMITY VENOUS BILATERAL CLINICAL INFORMATION: Bilateral lower extremity edema. COMPARISON: No prior study. TECHNIQUE: Venous doppler ultrasound was performed of the bilateral lower extremities using gray scale, color flow and spectral doppler imaging. FINDINGS: There is normal color flow, spectral analysis and compressibility of the right and left common femoral vein, femoral vein and popliteal veins. There is abnormal echogenicity in the right posterior tibial vein consistent with thrombus. There is abnormal echogenicity in the left greater saphenous, peroneal and possibly left anterior tibial veins, suspicious for thrombus. . There is normal color flow and compressibility in the left posterior tibial veins, right anterior tibial veins and right peroneal veins. 1. Areas of abnormal echogenicity in the right posterior tibial vein, left greater saphenous, left peroneal and left anterior tibial veins suspicious for thrombi. **This report has been created using voice recognition software. It may contain minor errors which are inherent in voice recognition technology. ** Final report electronically signed by DR Cedric Dwyer on 1/21/2023 11:28 AM      DVT prophylaxis: [] Lovenox                                 [] SCDs                                 [] SQ Heparin                                 [] Encourage ambulation           [x] Already on Anticoagulation     Code Status: Full Code    PT/OT Eval Status: None    Tele:   [x] yes             [] no    Sinus tachycardia without ectopy    Active Hospital Problems    Diagnosis Date Noted    Pulmonary embolism on right [facility]) [I26.99] 01/21/2023     Priority: Medium    Nausea and vomiting [R11.2] 01/21/2023     Priority: Medium    Abdominal bloating [R14.0] 01/21/2023     Priority: Medium    Deep vein thrombosis (DVT) of both lower extremities ([facility] [address] [I82.403] 01/21/2023  Priority: Medium    Hypotension [I95.9] 01/21/2023     Priority: Medium    Sinus tachycardia [R00.0] 01/21/2023     Priority: Medium    Leukocytosis [D72.829] 01/21/2023     Priority: Medium    Elevated troponin [R77.8] 01/21/2023     Priority: Medium    Bilateral leg edema [R60.0] 01/21/2023     Priority: Medium    Hyperlipidemia [E78.5] 01/21/2023     Priority: Medium    Tracheal stenosis [J39.8] 06/14/2022     Priority: Medium    Acute on chronic anemia [D64.9]      Priority: Medium    Tracheostomy in place (HCC) [Z93.0] 05/23/2022     Priority: Medium    Essential hypertension [I10] 01/14/2022    Type 2 diabetes mellitus with insulin therapy (HCC) [E11.9, Z79.4] 01/14/2022    History of coronary artery stent placement [Z95.5] 01/14/2022       Electronically signed by WYATT Rivera CNP on 1/25/2023 at 11:05 PM

## 2023-01-25 NOTE — PROGRESS NOTES
Spoke with DAGOBERTO Love regarding PICC order. [employer] informed this nurse that patient will be going home with PICC for frequent lab draws and future surgery. Patient may benefit from a Marilu Schaffer [phone] as PICC are a temporary solution with increase risk for infection and blood clot if not taking care of properly.

## 2023-01-25 NOTE — PROGRESS NOTES
Call placed to DAGOBERTO Sepulveda regarding PICC. Patient has no fluid or continuous medication that requires a central line at this time. Will need fluid to ordered to place central line for patient.

## 2023-01-25 NOTE — PROGRESS NOTES
Nader Carrera 62  INPATIENT OCCUPATIONAL THERAPY  [facility] MED SURG 8B  EVALUATION    Time:   Time In: 7298  Time Out: 9597  Timed Code Treatment Minutes: 27 Minutes  Minutes: 45          Date: 2023  Patient Name: Ramses Randolph,   Gender: female      MRN: [id number]  : 1950  (81 y.o.)  Referring Practitioner: MICHAEL Lafleur  Diagnosis: Abdominal Bloating  Additional Pertinent Hx: Pt with history of DM, HTN, COVID 2020 s/p tracheostomy 2022 follows with Dr. Osiel Hickman, presented to [facility] ED for complaints of abdominal bloating with nausea/vomiting. Restrictions/Precautions:  Restrictions/Precautions: Fall Risk  Position Activity Restriction  Other position/activity restrictions: Trach collar with moist air    Subjective  Chart Reviewed: Yes, Orders, History and Physical    Subjective: Pleasant and cooperative; pt was unable to vocalize. Comments: RN approved session. Pt C/O lower back pain when first sitting up at the edge of bed. No number given. Her nurse was notified. Comments / Details: Pt was sitting in the chair following session. She indicated her back pain was worse when she is laying in bed for long periods. Pain: No number given. Pt has lower back pain. It is helped by getting up and moving and made worse by staying in one position in bed.     Vitals: Vitals not assessed per clinical judgement, see nursing flowsheet; trach collar in place and humid air set at 4L     Social/Functional History:  Lives With: Spouse  Type of Home: House  Home Layout: Two level, Performs ADL's on one level  Home Access: Ramped entrance  Home Equipment: emil C[place], Wheelchair-manual   Bathroom Shower/Tub: Walk-in shower, Shower chair with back  [employer]: Bedside commode  Bathroom Equipment: Grab bars in shower  Bathroom Accessibility: [facility] 7 From: Family  ADL Assistance: Needs assistance  [address]: Needs assistance  Homemaking Responsibilities: No  Ambulation Assistance: Independent  Transfer Assistance: Independent    Active : No  Patient's  Info: Spouse transports. Occupation: Retired  Leisure & Hobbies: Watching sitcoms on TV, crafts  Additional Comments: Pt used a rolling walker prior to admission. She is limited to going short distances in the home. Pt takes a W/C when going out into the community. She has help with doing her shower. She shares home chores such as meal prep. VISION: Pt has had cataracts removed but reports her vision his decreased since her COVID illness. She has not been reading. It is difficult to see things up close, she reports. HEARING:  WFL    COGNITION: Decreased Problem Solving and Decreased Safety Awareness    RANGE OF MOTION:  Bilateral Upper Extremity:  WFL    STRENGTH:  Bilateral Upper Extremity:  Impaired - 3+/5 deltoid; 4-/5 pectoral ; 4-/5 biceps and triceps    SENSATION:   Numbness reported in her feet    ADL:   Grooming: with set-up. Mouth care while sitting in the chair  Toileting: Supervision. Pt was able to do her own clothing management and wiped after having voided. Toilet Transfer: Contact Guard Assistance. From the elevated toilet seat with armrests . BALANCE:  Sitting Balance:  Stand By Assistance. Doing self care or scooting at the edge of bed  Standing Balance: [employer]. Preparing to walk and pulling up/down her Depends- forward posture noted    BED MOBILITY:  Rolling to Right: Minimal Assistance, with rail    Supine to Sit: Minimal Assistance, with rail with head of bed elevated  Scooting: Stand By Assistance, with rail      TRANSFERS:  Sit to Stand:  Minimal Assistance. From the edge of bed  Stand to Sit: Stand By Assistance. To the chair    FUNCTIONAL MOBILITY:  Assistive Device: Rolling Walker  Assist Level:  Contact Guard Assistance. Distance:  To and from bathroom  Pt has even steps and slow pace- forward posture with pt resting her forearms on the  of the walker. Activity Tolerance:  Patient tolerance of  treatment: fair. Pt was having SOB while walking and standing. Assessment:  Assessment: Patient would benefit from continued skilled OT services to address above deficits. She presents with abdominal bloating. Pt has hx of DM, COVID, tracheostomy 9 months ago. She has had multiple hospitalizations since her COVID in Dec of 2021. Pt was doing her self care with help for showering. She walked short distances with a rolling walker with CGA and she shows a forward posture throughout. She needed MIN A for bed mobility. She completed a transfer from the edge of bed with MIN A. Pt demonstrated ADLs with setup A and CGA for toilet transfers. Pt tolerated session fair. Standing tolerance was limited. Performance deficits / Impairments: Decreased functional mobility , Decreased ADL status, Decreased endurance, Decreased sensation, Decreased strength, Decreased high-level IADLs, Decreased coordination, Decreased posture  Prognosis: Good  REQUIRES OT FOLLOW-UP: Yes  Decision Making: Medium Complexity    Treatment Initiated: Treatment and education initiated within context of evaluation. Evaluation time included review of current medical information, gathering information related to past medical, social and functional history, completion of standardized testing, formal and informal observation of tasks, assessment of data and development of plan of care and goals. Treatment time included skilled education and facilitation of tasks to increase safety and independence with ADL's for improved functional independence and quality of life. Pt discussed her prior level of functioning. She has limited going out of the home to when she has appointments. She has difficulty with her standing posture. Discussed the effect of that on her tolerance of walking and doing any standing activity. Pt indicated she understood.   Pt has been having increased shakiness in her hands while doing activity. Strategies to help her coordination were discussed. Pt does most activities but indicates her vision has also decreased for near activities in recent months. Adequate lighting and trying to read large print was discussed. Discharge Recommendations:  Patient would benefit from continued therapy after discharge, Home with Home health OT    Patient Education:     Patient Education  Education Given To: Patient  Education Provided: Role of Therapy, Plan of Care, ADL Adaptive Strategies  Education Provided Comments: Importance of having good posture when doing standing activity to increase her tolerance  Education Method: Verbal  Barriers to Learning: None  Education Outcome: Verbalized understanding, Unable to demonstrate understanding    Equipment Recommendations:  Equipment Needed: Yes  Other: dressing stick, magnification adaptations    Plan:  Times Per Week: 5x  Current Treatment Recommendations: Strengthening, Functional mobility training, Endurance training, Self-Care / ADL, Safety education & training  Additional Comments: Pt would benefit from continued skilled OT services when medically stable and discharged from Acute. HHOT recommended. Specific Instructions for Next Treatment: Functional mobility; ADLs and standing tolerance; fine motor tasks and adaptations; UE exercises. See long-term goal time frame for expected duration of plan of care. If no long-term goals established, a short length of stay is anticipated. Goals:  Patient goals : Return to doing her home routine and doing things which she can enjoy. Pt agrees. Short Term Goals  Time Frame for Short Term Goals: By discharge  Short Term Goal 1: Pt will complete simple ADLs while standing for over 2 minute duration while using 1-2 hand release to increase her safety and endurance for ease of dressing or grooming.   Short Term Goal 2: Pt will demonstrate functional mobility walking to/from the bathroom with SBA and verbal cues for posture as needed to prepare for doing self care at the sink or her toileting routine. Short Term Goal 3: Pt will complete fine motor tasks while using any adaptations or stabilizing strategy needed to increase her coordination for ease of doing ADLs and cooking or crafts. Short Term Goal 4: Pt will complete BUE ROM/light resistance exercises while following any handout or demonstration needed to increase her endurance and strength for ease of doing ADLs and functional mobility. Additional Goals?: No         Following session, patient left in safe position with all fall risk precautions in place.

## 2023-01-25 NOTE — PROGRESS NOTES
Nader Carrera 62  PHYSICAL THERAPY MISSED TREATMENT NOTE  [facility] MED SURG 8B    Date: 2023  Patient Name: Lyric Hernandez        MRN: [id number]   : 1950  (81 y.o.)  Gender: female                REASON FOR MISSED TREATMENT:  RN reports pt is supposed to be getting a few things done soon (abdominal xray and PICC line placement check). Pt reports being too tired to do anything as well and requested to wait until tomorrow. Elray [place].  Yaakov Leal, Hilary Tamms 8

## 2023-01-25 NOTE — PROGRESS NOTES
Outpatient Medication Coverage     Patient has not met deductible. Patient still has $49.42 left of deductible for the year so the following costs will reflect that. Eliquis:  Initial copay: $154.09   Copay for following months after meeting deductible: $104.67 per month   Free 30 day trial is available if patient has never taken. Xarelto:  Initial copay: $150.37   Copay for following months after meeting deductible: $100.95 per month   Free 30 day trial is available if patient has never taken. Please call Pricila Gaviria with Medication Assistance at [phone] with any questions.

## 2023-01-25 NOTE — CARE COORDINATION
1/25/23, 1:35 PM EST    DISCHARGE ON GOING EVALUATION    Peyton [facility] day: 0  Location: 8B-34/034-A Reason for admit: Abdominal bloating [R14.0]  Tachycardia [R00.0]  Other acute pulmonary embolism without acute cor pulmonale (HCC) [I26.99]  Pulmonary embolism, unspecified chronicity, unspecified pulmonary embolism type, unspecified whether acute cor pulmonale present ([address] [I26.99]  Nausea and vomiting, unspecified vomiting type [R11.2]   Procedure: No.  Barriers to Discharge: Heparin gtt off and po anticoagulation initiated, Xarelto. Pharm researched cost for pt. PCP: Dariel Preston DO  Patient Goals/Plan/Treatment Preferences: Plans return home with spouse at discharge. SW following for current [facility] services.

## 2023-01-26 ENCOUNTER — TELEPHONE (OUTPATIENT)
Dept: CARDIOLOGY CLINIC | Age: 73
End: 2023-01-26

## 2023-01-26 ENCOUNTER — APPOINTMENT (OUTPATIENT)
Dept: CT IMAGING | Age: 73
DRG: 176 | End: 2023-01-26
Payer: MEDICARE

## 2023-01-26 LAB
ANION GAP SERPL CALC-SCNC: 10 MEQ/L (ref 8–16)
BASOPHILS ABSOLUTE: 0 THOU/MM3 (ref 0–0.1)
BASOPHILS NFR BLD AUTO: 0.6 %
BUN SERPL-MCNC: 4 MG/DL (ref 7–22)
CALCIUM SERPL-MCNC: 8.4 MG/DL (ref 8.5–10.5)
CHLORIDE SERPL-SCNC: 102 MEQ/L (ref 98–111)
CO2 SERPL-SCNC: 24 MEQ/L (ref 23–33)
CREAT SERPL-MCNC: 0.7 MG/DL (ref 0.4–1.2)
DEPRECATED RDW RBC AUTO: 55.5 FL (ref 35–45)
EOSINOPHIL NFR BLD AUTO: 1.6 %
EOSINOPHILS ABSOLUTE: 0.1 THOU/MM3 (ref 0–0.4)
ERYTHROCYTE [DISTWIDTH] IN BLOOD BY AUTOMATED COUNT: 15.5 % (ref 11.5–14.5)
GFR SERPL CREATININE-BSD FRML MDRD: > 60 ML/MIN/1.73M2
GLUCOSE BLD STRIP.AUTO-MCNC: 159 MG/DL (ref 70–108)
GLUCOSE BLD STRIP.AUTO-MCNC: 180 MG/DL (ref 70–108)
GLUCOSE BLD STRIP.AUTO-MCNC: 198 MG/DL (ref 70–108)
GLUCOSE BLD STRIP.AUTO-MCNC: 217 MG/DL (ref 70–108)
GLUCOSE SERPL-MCNC: 167 MG/DL (ref 70–108)
HCT VFR BLD AUTO: 25.5 % (ref 37–47)
HCT VFR BLD AUTO: 26.3 % (ref 37–47)
HCT VFR BLD AUTO: 26.3 % (ref 37–47)
HCT VFR BLD AUTO: 26.5 % (ref 37–47)
HGB BLD-MCNC: 8.3 GM/DL (ref 12–16)
HGB BLD-MCNC: 8.3 GM/DL (ref 12–16)
HGB BLD-MCNC: 8.4 GM/DL (ref 12–16)
HGB BLD-MCNC: 8.4 GM/DL (ref 12–16)
IMM GRANULOCYTES # BLD AUTO: 0.11 THOU/MM3 (ref 0–0.07)
IMM GRANULOCYTES NFR BLD AUTO: 2.2 %
LYMPHOCYTES ABSOLUTE: 1.2 THOU/MM3 (ref 1–4.8)
LYMPHOCYTES NFR BLD AUTO: 23.5 %
MCH RBC QN AUTO: 31.7 PG (ref 26–33)
MCHC RBC AUTO-ENTMCNC: 31.9 GM/DL (ref 32.2–35.5)
MCV RBC AUTO: 99.2 FL (ref 81–99)
MONOCYTES ABSOLUTE: 0.7 THOU/MM3 (ref 0.4–1.3)
MONOCYTES NFR BLD AUTO: 13.4 %
NEUTROPHILS NFR BLD AUTO: 58.7 %
NRBC BLD AUTO-RTO: 0 /100 WBC
PLATELET # BLD AUTO: 185 THOU/MM3 (ref 130–400)
PMV BLD AUTO: 9.4 FL (ref 9.4–12.4)
POTASSIUM SERPL-SCNC: 4 MEQ/L (ref 3.5–5.2)
RBC # BLD AUTO: 2.65 MILL/MM3 (ref 4.2–5.4)
SEGMENTED NEUTROPHILS ABSOLUTE COUNT: 3 THOU/MM3 (ref 1.8–7.7)
SODIUM SERPL-SCNC: 136 MEQ/L (ref 135–145)
WBC # BLD AUTO: 5.1 THOU/MM3 (ref 4.8–10.8)

## 2023-01-26 PROCEDURE — 82948 REAGENT STRIP/BLOOD GLUCOSE: CPT

## 2023-01-26 PROCEDURE — 97530 THERAPEUTIC ACTIVITIES: CPT

## 2023-01-26 PROCEDURE — 2580000003 HC RX 258

## 2023-01-26 PROCEDURE — 85025 COMPLETE CBC W/AUTO DIFF WBC: CPT

## 2023-01-26 PROCEDURE — 97162 PT EVAL MOD COMPLEX 30 MIN: CPT

## 2023-01-26 PROCEDURE — 36415 COLL VENOUS BLD VENIPUNCTURE: CPT

## 2023-01-26 PROCEDURE — 99233 SBSQ HOSP IP/OBS HIGH 50: CPT

## 2023-01-26 PROCEDURE — 80048 BASIC METABOLIC PNL TOTAL CA: CPT

## 2023-01-26 PROCEDURE — 6370000000 HC RX 637 (ALT 250 FOR IP): Performed by: PHYSICIAN ASSISTANT

## 2023-01-26 PROCEDURE — 85014 HEMATOCRIT: CPT

## 2023-01-26 PROCEDURE — 97110 THERAPEUTIC EXERCISES: CPT

## 2023-01-26 PROCEDURE — 99221 1ST HOSP IP/OBS SF/LOW 40: CPT | Performed by: PHYSICIAN ASSISTANT

## 2023-01-26 PROCEDURE — 97535 SELF CARE MNGMENT TRAINING: CPT

## 2023-01-26 PROCEDURE — 6370000000 HC RX 637 (ALT 250 FOR IP): Performed by: STUDENT IN AN ORGANIZED HEALTH CARE EDUCATION/TRAINING PROGRAM

## 2023-01-26 PROCEDURE — 2700000000 HC OXYGEN THERAPY PER DAY

## 2023-01-26 PROCEDURE — 1200000000 HC SEMI PRIVATE

## 2023-01-26 PROCEDURE — 6370000000 HC RX 637 (ALT 250 FOR IP): Performed by: FAMILY MEDICINE

## 2023-01-26 PROCEDURE — 70486 CT MAXILLOFACIAL W/O DYE: CPT

## 2023-01-26 PROCEDURE — 85018 HEMOGLOBIN: CPT

## 2023-01-26 PROCEDURE — 94760 N-INVAS EAR/PLS OXIMETRY 1: CPT

## 2023-01-26 PROCEDURE — 6360000002 HC RX W HCPCS

## 2023-01-26 PROCEDURE — 6370000000 HC RX 637 (ALT 250 FOR IP)

## 2023-01-26 RX ORDER — LIDOCAINE 40 MG/G
CREAM TOPICAL PRN
Status: DISCONTINUED | OUTPATIENT
Start: 2023-01-26 | End: 2023-01-29 | Stop reason: HOSPADM

## 2023-01-26 RX ORDER — INSULIN LISPRO 100 [IU]/ML
0-4 INJECTION, SOLUTION INTRAVENOUS; SUBCUTANEOUS NIGHTLY
Status: DISCONTINUED | OUTPATIENT
Start: 2023-01-27 | End: 2023-01-27

## 2023-01-26 RX ORDER — INSULIN LISPRO 100 [IU]/ML
0-8 INJECTION, SOLUTION INTRAVENOUS; SUBCUTANEOUS
Status: DISCONTINUED | OUTPATIENT
Start: 2023-01-27 | End: 2023-01-27

## 2023-01-26 RX ORDER — LISINOPRIL 5 MG/1
5 TABLET ORAL DAILY
Status: DISCONTINUED | OUTPATIENT
Start: 2023-01-27 | End: 2023-01-29 | Stop reason: HOSPADM

## 2023-01-26 RX ORDER — HYDROCODONE BITARTRATE AND ACETAMINOPHEN 5; 325 MG/1; MG/1
1 TABLET ORAL EVERY 6 HOURS PRN
Status: DISCONTINUED | OUTPATIENT
Start: 2023-01-26 | End: 2023-01-27

## 2023-01-26 RX ORDER — IBUPROFEN 400 MG/1
400 TABLET ORAL ONCE
Status: COMPLETED | OUTPATIENT
Start: 2023-01-26 | End: 2023-01-26

## 2023-01-26 RX ORDER — LIDOCAINE 4 G/G
1 PATCH TOPICAL DAILY
Status: DISCONTINUED | OUTPATIENT
Start: 2023-01-26 | End: 2023-01-29 | Stop reason: HOSPADM

## 2023-01-26 RX ADMIN — SIMETHICONE 80 MG: 80 TABLET, CHEWABLE ORAL at 10:03

## 2023-01-26 RX ADMIN — SIMETHICONE 80 MG: 80 TABLET, CHEWABLE ORAL at 17:50

## 2023-01-26 RX ADMIN — SODIUM CHLORIDE 3000 MG: 900 INJECTION INTRAVENOUS at 13:09

## 2023-01-26 RX ADMIN — SIMETHICONE 80 MG: 80 TABLET, CHEWABLE ORAL at 13:17

## 2023-01-26 RX ADMIN — INSULIN GLARGINE 22 UNITS: 100 INJECTION, SOLUTION SUBCUTANEOUS at 21:36

## 2023-01-26 RX ADMIN — LIDOCAINE 4%: 4 CREAM TOPICAL at 13:04

## 2023-01-26 RX ADMIN — IBUPROFEN 400 MG: 400 TABLET, FILM COATED ORAL at 06:13

## 2023-01-26 RX ADMIN — HYDROCODONE BITARTRATE AND ACETAMINOPHEN 1 TABLET: 5; 325 TABLET ORAL at 20:43

## 2023-01-26 RX ADMIN — ASPIRIN 81 MG 81 MG: 81 TABLET ORAL at 10:03

## 2023-01-26 RX ADMIN — ROSUVASTATIN 10 MG: 10 TABLET, FILM COATED ORAL at 10:03

## 2023-01-26 RX ADMIN — POLYETHYLENE GLYCOL 3350 17 G: 17 POWDER, FOR SOLUTION ORAL at 10:03

## 2023-01-26 RX ADMIN — SENNOSIDES 8.6 MG: 8.6 TABLET, FILM COATED ORAL at 20:52

## 2023-01-26 RX ADMIN — INSULIN LISPRO 1 UNITS: 100 INJECTION, SOLUTION INTRAVENOUS; SUBCUTANEOUS at 17:50

## 2023-01-26 RX ADMIN — HYDROCODONE BITARTRATE AND ACETAMINOPHEN 1 TABLET: 5; 325 TABLET ORAL at 13:02

## 2023-01-26 RX ADMIN — DOCUSATE SODIUM 100 MG: 100 CAPSULE, LIQUID FILLED ORAL at 20:52

## 2023-01-26 RX ADMIN — RIVAROXABAN 15 MG: 15 TABLET, FILM COATED ORAL at 10:03

## 2023-01-26 RX ADMIN — METOPROLOL SUCCINATE 25 MG: 25 TABLET, EXTENDED RELEASE ORAL at 10:03

## 2023-01-26 RX ADMIN — RIVAROXABAN 15 MG: 15 TABLET, FILM COATED ORAL at 22:32

## 2023-01-26 RX ADMIN — DOCUSATE SODIUM 100 MG: 100 CAPSULE, LIQUID FILLED ORAL at 10:03

## 2023-01-26 RX ADMIN — SODIUM CHLORIDE 3000 MG: 900 INJECTION INTRAVENOUS at 17:55

## 2023-01-26 RX ADMIN — SODIUM CHLORIDE, PRESERVATIVE FREE 10 ML: 5 INJECTION INTRAVENOUS at 20:53

## 2023-01-26 RX ADMIN — SIMETHICONE 80 MG: 80 TABLET, CHEWABLE ORAL at 20:52

## 2023-01-26 ASSESSMENT — PAIN DESCRIPTION - LOCATION
LOCATION: JAW
LOCATION: JAW

## 2023-01-26 ASSESSMENT — PAIN DESCRIPTION - ORIENTATION
ORIENTATION: LEFT
ORIENTATION: LEFT

## 2023-01-26 ASSESSMENT — PAIN SCALES - GENERAL
PAINLEVEL_OUTOF10: 3
PAINLEVEL_OUTOF10: 2
PAINLEVEL_OUTOF10: 3
PAINLEVEL_OUTOF10: 7
PAINLEVEL_OUTOF10: 0
PAINLEVEL_OUTOF10: 0
PAINLEVEL_OUTOF10: 3
PAINLEVEL_OUTOF10: 0
PAINLEVEL_OUTOF10: 2
PAINLEVEL_OUTOF10: 0
PAINLEVEL_OUTOF10: 3
PAINLEVEL_OUTOF10: 5
PAINLEVEL_OUTOF10: 2
PAINLEVEL_OUTOF10: 7
PAINLEVEL_OUTOF10: 10
PAINLEVEL_OUTOF10: 3

## 2023-01-26 ASSESSMENT — PAIN DESCRIPTION - DESCRIPTORS
DESCRIPTORS: ACHING;SHOOTING
DESCRIPTORS: ACHING

## 2023-01-26 ASSESSMENT — PAIN SCALES - WONG BAKER: WONGBAKER_NUMERICALRESPONSE: 2

## 2023-01-26 NOTE — PROGRESS NOTES
[address] [place] [address]  INPATIENT PHYSICAL THERAPY  EVALUATION  [facility] MED SURG 8B - 8B-34/034-A    Time In: 9431  Time Out: 8235  Timed Code Treatment Minutes: 25 Minutes  Minutes: 34          Date: 2023  Patient Name: Kelley Ferreira,  Gender:  female        MRN: [id number]  : 1950  (81 y.o.)      Referring Practitioner: MICHAEL Bucio  Diagnosis: Abdominal Bloating  Additional Pertinent Hx: Pt with history of DM, HTN, COVID 2020 s/p tracheostomy 2022 follows with Dr. Julio Zavala, presented to [facility] ED for complaints of abdominal bloating with nausea/vomiting. Restrictions/Precautions:  Restrictions/Precautions: Fall Risk, General Precautions  Position Activity Restriction  Other position/activity restrictions: Trach collar with moist air    Subjective:  Chart Reviewed: Yes  Patient assessed for rehabilitation services?: Yes  Subjective: Pt sitting up in room recliner and agrees to therapy. General:     Vision: Within Functional Limits  Hearing: Within functional limits       Pain: no # given: back chronic    Vitals: Vitals not assessed per clinical judgement, see nursing flowsheet    Social/Functional History:    Lives With: Spouse  Type of Home: House  Home Layout: Two level, Performs ADL's on one level  Home Access: Ramped entrance  Home Equipment: Walker, rolling, Wheelchair-manual     Bathroom Shower/Tub: Walk-in shower, Shower chair with back  Bathroom Toilet: Bedside commode  Bathroom Equipment: Grab bars in shower  Bathroom Accessibility: [address] Help From: Family  ADL Assistance: Needs assistance  [address]: Needs assistance  Homemaking Responsibilities: No  Ambulation Assistance: Independent  Transfer Assistance: Independent    Active : No  Occupation: Retired  Leisure & Hobbies: Watching sitcoms on TV, crafts  Additional Comments: Pt used a rolling walker prior to admission. She is limited to going short distances in the home.  Pt takes a W/C when going out into the community. She has help with doing her shower. She shares home chores such as meal prep. OBJECTIVE:  Range of Motion:  Bilateral Lower Extremity: WFL    Strength:  Bilateral Lower Extremity: grossly 4-/5    Balance:  Static Sitting Balance:  Supervision  Dynamic Sitting Balance: Supervision  Static Standing Balance: Contact Guard Assistance  Dynamic Standing Balance: Contact Guard Assistance    Bed Mobility:  Not Tested    Transfers:  Sit to Stand: Contact Guard Assistance  Stand to Sit:Contact Guard Assistance    Ambulation:  Contact Guard Assistance  Distance: 50 ft  Surface: Level Tile  Device:Rolling Walker  Gait Deviations: Forward Flexed Posture, Decreased Step Length Bilaterally, Decreased Gait Speed, Decreased Heel Strike Bilaterally, and Unsteady Gait    Exercise:  Patient was guided in 1 set(s) 10 reps of exercise to both lower extremities. Ankle pumps, Glut sets, Quad sets, Hip abduction/adduction, Seated marches, Seated heel/toe raises, and Long arc quads. Exercises were completed for increased independence with functional mobility. Functional Outcome Measures: Completed  -PAC Inpatient Mobility without Stair Climbing Raw Score : 15  -PAC Inpatient without Stair Climbing T-Scale Score : 43.03    ASSESSMENT:  Activity Tolerance:  Patient tolerance of  treatment: good. Treatment Initiated: Treatment and education initiated within context of evaluation. Evaluation time included review of current medical information, gathering information related to past medical, social and functional history, completion of standardized testing, formal and informal observation of tasks, assessment of data and development of plan of care and goals. Treatment time included skilled education and facilitation of tasks to increase safety and independence with functional mobility for improved independence and quality of life. Assessment:   Body Structures, Functions, Activity Limitations Requiring Skilled Therapeutic Intervention: Decreased functional mobility , Decreased strength, Decreased endurance, Decreased balance  Assessment: Pt is a 82 yo female that is mildly deconditioned and participated well. Pt was generally Mod I for mobility in [facility] and is at [facility] today. Pt would benefit from continued skilled PT to address strengthening, balance, bed mobility, endurance building, and functional mobility training. Therapy Prognosis: Good    Requires PT Follow-Up: Yes    Discharge Recommendations:  Discharge Recommendations: Continue to assess pending progress, Home with Home health PT, Patient would benefit from continued therapy after discharge, Therapy recommended at discharge    Patient Education:      . Patient Education  Education Given To: Patient  Education Provided: Role of Therapy, Plan of Care, Home Exercise Program  Education Method: Demonstration, Verbal  Barriers to Learning: None  Education Outcome: Verbalized understanding, Demonstrated understanding       Equipment Recommendations:  Equipment Needed: No    Plan:  Current Treatment Recommendations: Strengthening, Balance training, Endurance training, Functional mobility training, Transfer training, Gait training, Home exercise program, Patient/Caregiver education & training, Safety education & training, Therapeutic activities  General Plan:  (5x GM)    Goals:  Patient Goals : go home  Short Term Goals  Time Frame for Short Term Goals: at discharge  Short Term Goal 1: Pt to be Mod I for supine <> sit to get in/out of bed  Short Term Goal 2: Pt to be Mod I for sit <> stand to get up to ambulate  Short Term Goal 3: Pt to ambulate >70 ft with RW with Supervision for household distances  Long Term Goals  Time Frame for Long Term Goals : not set due to short ELOS    Following session, patient left in safe position with all fall risk precautions in place. Dena Best.  Hilary Garcia [place] 8

## 2023-01-26 NOTE — CARE COORDINATION
1/26/23, 10:26 AM EST    DISCHARGE ON GOING EVALUATION    Nahun Reyes [facility] day: 1  Location: -34/034-A Reason for admit: Abdominal bloating [R14.0]  Tachycardia [R00.0]  Other acute pulmonary embolism without acute cor pulmonale (HCC) [I26.99]  Pulmonary embolism, unspecified chronicity, unspecified pulmonary embolism type, unspecified whether acute cor pulmonale present (HCC) [I26.99]  Nausea and vomiting, unspecified vomiting type [R11.2]  PE (pulmonary thromboembolism) ([facility] [I26.99]   Procedure: No  Barriers to Discharge: Heparin gtt off and pt now on Xarelto. Monitor blood tinged sputum from trach site. Repeat H&H at 11am.  PCP: Josephine Valente DO  Readmission Risk Score: 23.5%  Patient Goals/Plan/Treatment Preferences: Plans return home with spouse possibly tomorrow with continued [facility]. SW following.

## 2023-01-26 NOTE — PROGRESS NOTES
Respiratory Care Artificial Airway Evaluation    The patient's airway is older than seven days. The patient's airway does not have panic sutures. Patient experiencing no problems. Type of airway:  [x] Tracheostomy  [] Larngectomy    Size 6    Type  []Shiley adult flexible tracheostomy tube with taperguard cuff   []Shiley disposable cannula cuffed tracheostomy tube (DCT)   []Shiley diposable cannula fenestrated cuffed tracheostomy tube (DFEN)  []Shiley disposable cannula cuffed percutaneous tracheostomy tube (PERC)  []Shiley disposable cannula cuffless tracheostomy tube (DCFS)  [] Shiley disposable cannula cuffless fenestrated tracheostomy tube [facility])  []Shiley extended length cuffless tracheostomy tube  []Shiley extended length cuffed tracheostomy tube  []Shiley laryngectomy tube (LGT)  []Shiley single cannula cuffed tracheostomy tube (SCT)  []Portex Bivona silicone tracheostomy tube  [x]Other: Shiley cuffless     Length  [x]standard  []99 mm  []120 mm  []Other:    Reference number for Shiley tracheostomy tube with taperguard cuff. Needed for homecare to order. Located on [facility].    [] [address] [facility]    [] [facility]  [] [address] [facility]  [] [id number]  [] [id number]  [] [id number]  [x] [id number]  [] [id number]    Inner cannula size for Shiley tracheostomy tube with taperguard cuff  [] 4IC65  [] 6IC75  [] [id number]

## 2023-01-26 NOTE — PROGRESS NOTES
Hospitalist Progress Note    Patient:  Tabby Organ      Unit/Bed:8B-34/034-A    YOB: 1950    MRN: [id number]       Acct: [de-identified]     PCP: Montez Alicea DO    Date of Admission: 1/21/2023    Assessment/Plan:    Acute PE/DVT:   CTA chest: Filling defects in the right lower lobe pulmonary arterial branches consistent with acute pulmonary emboli. No evidence of right ventricular strain. US Duplex + for DVT Areas of abnormal echogenicity in the right posterior tibial vein, left greater saphenous, left peroneal and left anterior tibial veins suspicious for thrombi. Likely due to reduced mobility, walks few feet at home but since COVID, less active. S/p IV Heparin drip transitioned to DOAC  Echo showed no acute issues (see # 11). Patient did have some blood-tinged sputum with coughing. Via chart review. She has had this before in the past.  We will consult ENT for further recommendations and evaluation of tracheostomy. Left facial pain and swelling -> ?possible dental abscess? 1/26: Patient reports that she has having 10/10 left-sided facial pain her mouth. Unable to tell me if from top or bottom teeth. Reports that she has been having intermittent pains in her mouth for the past 3 to 4 days but did not think anything seriously of it. States she feels like left-sided her face is more swollen than the right. Was slightly warm to touch. Start empiric IV Unasyn  CT facial bones negative for dental abscess however does note multiple metallic dental fillings. ENT consulted for further evaluation  Hemoptysis  Patient reports intermittent blood tinged mucous coming from trach. States that it's clear green at times and then intermittently has some blood tinge to it. Patient also reports she has not had humidified oxygen through her trach mask d/t malfunction of humidifier. Discussed with ENT who does not believe patient needs pulmonology consult at this time.  Believes bleeding may be from the mucosa being dried and trach care. Okay to resume [address], but monitor for recurrence/worsened hemoptysis. Consider CT chest and pulmonology consult if continues  Trend and monitor H&H q6H. Bilateral LE edema:   Likely due to DVTs. PT/OT. Abdominal pain and bloating, n/v -- likely due to constipation    KUB is notable for mild gaseous distention of the stomach and scattered gas in the colon, moderate amount of fecal material in the colon, no abnormally dilated bowel loops. -- ?gastroparesis  Bowel regimen: Colace, senna, MiraLAX. Hypotension (POA), resolved  BP low in ER 79/57 in ER and HR up 128 -- most likely due to hypovolemia with decreased po, lasix use and with BP meds --> improved with IVF. Will hold further IVF with BLE edema issues -- cont home lisinopril, toprol with parameters and monitor closely -- consider further IVF if recurs-- ?due to adrenal insufficiency with recent tapering of steroids over last couple months  -- may need stress dose steroids if BP worsens again. -- less likely due to PE as no RV strain on CT - Echo okay. Sinus tachycardia  Chronic component also per pt -  in ER -- improved with IVF - likely due to combo of hypovolemia and PE  Continue Toprol > increase to 50 mg   Monitor tele  Leukocytosis, resolved  12.6 initially in ER with repeat 4 hrs later WNL-- afeb - monitor with above - Suspect likely reactive to dehydration. Acute on chronic macrocytic anemia   ? ?dry on initial presentation as hgb 12.1 and s/p IVF down to 9.9 -- prior in 8/2022 was 13 but in 6/2022 was 9-10's  Transfuse for hemoglobin less than 7 g/dL or hemodynamic instability. Iron studies ordered  Essential HTN:   BP controlled today. Continue Troprol. Hold Lisinopril given relative hypotension. Hold lasixBP medications with parameters. CAD:   s/p PCI in the history. Lipitor, BB, & ASA. IDDMII:   HbA1c 5.5 (1/24/23).    On Lantus 44u nightly with Humalog at home, however this was for when she was on steroids. Decrease lantus to 22 units nightly which is 1/2 home dose and monitor BS   Continue medium dose SSI  ?need decrease as pt was on long term steroids and have been weaned off - cont monitor closely Monitor BG with inpatient goal 140-180. Accu-Cheks  Hypoglycemia protocol  Diabetic diet  Chronic respiratory failure with tracheostomy, tracheal stenosis -- at baseline -- monitor with #1   Following with ENT Dr. Tamika Rodriguez and plan for surgery 2/9/23 but may need to be delayed. Cardiac clearance appointment rescheduled for 2/2/2023. Hx COVID s/p tracheostomy: 12/2021-04/2022  Elevated troponin   Initially 0.021 -> 0.019 in ER -- likely due to PE -- no acute EKG changes  Echo 1/23/23: Notable for EF 60-65%, no WMA, G1DD, RVSP 40 mmHg - last echo 3/2022 with EF 60%, no WMA - stress 6/2022 non-diagnostic for ischemia, normal EF.  CAD:   with history of stent -- ASA, BB, statin - trop min elevated on admission - trend - EKG no acute changes   Hyperlipidemia:   Statin  Obesity:   BMI 38.55 kg/m². Discussed and educated on lifestyle modifications. Expected discharge date: Pending clinical course    Disposition:    [x] Home       [] TCU       [] Rehab       [] Psych       [] SNF       [] [place]       [] Other-    Chief Complaint: Abdominal bloating    Hospital Course: Per HPI documented 1/21/2023: \"81 yo F with history of DM, HTN, COVID 12/2020 s/p tracheostomy 04/2022 follows with Dr. Tamika Rodriguez, presented to [facility] ED for complaints of abdominal bloating with nausea/vomiting. Ed: afebrile, hemodynamically stable tachycardic, saturating well on 0.5 L trach mask. Labs notable for Trop 0.021 then 0.019 on repeat, leukocytosis 12.6. Ddimer elevation ~5000. CTA chest: Filling defects in the right lower lobe pulmonary arterial branches consistent with acute pulmonary emboli. No evidence of right ventricular strain. CXR WNL. KUB with no acute process.  US Duplex + for distal DVT Areas of abnormal echogenicity in the right posterior tibial vein, left greater saphenous, left peroneal and left anterior tibial veins suspicious for thrombi. Started on heparin gtt. Given reglan ang GI cocktal. Had a BM. Admitted for further management. On eval, per patient and her  present at bedside, reports has been having issues with constipation and bloating last couple of days which got worse. Woke up with worsened abdominal bloating/distention with nausea and vomiting. Had a large BM overnight, feels slightly better since then. No subjective fever, chills, dizziness, diarrhea. Dyspnea was in the setting of weaning down her oxygen needs following a weaning protcol. No chest pain, palpitations, syncope. Follows with Dr. Roslyn Vera. Plan for weaning off trach with a procedure on 02/09/2023. \"    1/25/23: Resumed care of patient today. She is sitting up in bedside recliner, nontoxic in appearance, no apparent distress. Remains afebrile, satting 100% on room air, hemodynamically stable. Patient stating that she is still having an intermittent cough. States that at times it is blood-tinged but it is more clear today. She did remove her inner cannula and it was noted to have dried blood on it. Patient states she has not cleaned her cannula since yesterday. We will clean cannula, monitor overnight. Consider switching back to heparin. May need to consult Dr. Roslyn Vera and ENT for further recommendations. Patient also stating she has not had a bowel movement yet in 5 days. States she is passing lots of gas but feels nauseous and mildly bloated today. Will obtain repeat KUB and start her on bowel regimen. Keep patient overnight for closer monitoring of minimal blood in trach tube. Additionally patient has a PICC line has been in place since August of last year.   Through the notes it appears that patient is very hard stick gets weekly lab draws and per note from  [facility] in 8/2022 wanted patient to have her PICC line given that she would need recurrent procedures. X-ray showing the PICC line is not an appropriate place, will need to be removed and replaced for lab draws. We will keep her on IV fluids at [facility] rate to keep PICC line open while in hospital.    1/26/23: Afebrile, hemodynamically stable. Patient reports that she has having 10/10 left-sided facial pain her mouth. Unable to tell me if from top or bottom teeth. Reports that she has been having intermittent pains in her mouth for the past 3 to 4 days but did not think anything seriously of it. States she feels like left-sided her face is more swollen than the right. Was slightly warm to touch. Will start Unasyn and get CT of the face. ENT consulted. Patient reports intermittent blood tinged mucous coming from trach. States that it's clear green at times and then intermittently has some blood tinge to it. Patient also reports she has not had humidified oxygen through her trach mask d/t malfunction of humidifier. Discussed with ENT who does not believe patient needs pulmonology consult at this time. Believes bleeding may be from the mucosa being dried and trach care. Okay to resume [facility], but monitor for recurrence/worsened hemoptysis. Subjective (past 24 hours):      Denies chest pains, palpitations, dizziness, lightheadedness, shortness of breath at rest, ROSA, abdominal pain, nausea, vomiting, diarrhea, fever, chills.     Medications:  Reviewed    Infusion Medications    sodium chloride      dextrose       Scheduled Medications    lidocaine  1 patch TransDERmal Daily    [START ON 1/27/2023] lisinopril  5 mg Oral Daily    ampicillin-sulbactam  3,000 mg IntraVENous 4 times per day    [START ON 1/27/2023] insulin lispro  0-8 Units SubCUTAneous TID     insulin lispro  0-4 Units SubCUTAneous Nightly    docusate sodium  100 mg Oral BID    senna  1 tablet Oral Nightly    polyethylene glycol  17 g Oral Daily lidocaine 1 % injection  5 mL IntraDERmal Once    sodium chloride flush  5-40 mL IntraVENous 2 times per day    metoprolol succinate  25 mg Oral Daily    rivaroxaban  15 mg Oral BID WC    aspirin  81 mg Oral Daily    furosemide  40 mg Oral Daily    rosuvastatin  10 mg Oral Daily    simethicone  80 mg Oral 4x Daily    insulin glargine  22 Units SubCUTAneous Nightly     PRN Meds: lidocaine, HYDROcodone 5 mg - acetaminophen, sodium chloride flush, sodium chloride, albuterol, ondansetron **OR** ondansetron, polyethylene glycol, acetaminophen **OR** acetaminophen, albuterol sulfate HFA, nitroGLYCERIN, glucose, dextrose bolus **OR** dextrose bolus, glucagon (rDNA), dextrose      Intake/Output Summary (Last 24 hours) at 1/26/2023 2100  Last data filed at 1/26/2023 1612  Gross per 24 hour   Intake 600 ml   Output --   Net 600 ml       Diet:  ADULT DIET; Regular; 4 carb choices (60 gm/meal); Low Sodium (2 gm)    Exam:  /61   Pulse (!) 102   Temp 98.2 °F (36.8 °C) (Oral)   Resp 22   Ht 5' 2\" (1.575 m)   Wt 210 lb 12.2 oz (95.6 kg)   LMP  (LMP Unknown)   SpO2 100%   BMI 38.55 kg/m²     General appearance: No apparent distress, appears older than stated age and cooperative. Chronically ill in appearance. Nonverbal due to not having [place] valve, but can communicate through writing and mouthing words. HEENT: Pupils equal, round, and reactive to light. Conjunctivae/corneas clear. Poor dentition, multiple caps and fillings in teeth. Mild left facial swelling, warmth. No abscesses appreciated on inner mouth and gum line. No cervical lymphadenopathy. Neck: Supple, with full range of motion. No jugular venous distention. Trachea midline. Inner cannula removed showing scant amount of dark crust, no significant pink tinged mucous, bright red blood appreciated. Respiratory:  Normal respiratory effort. Clear to auscultation, bilaterally without Rales/Wheezes/Rhonchi.   Cardiovascular: Regular rate and regular rhythm with normal S1/S2 without murmurs, rubs or gallops. Abdomen: Soft, non-tender, non-distended with normal bowel sounds. Musculoskeletal: passive and active ROM x 4 extremities. Skin: Skin color, texture, turgor normal.  No rashes or lesions. Neurologic:  Neurovascularly intact without any focal sensory/motor deficits. Cranial nerves: II-XII intact, grossly non-focal.  Psychiatric: Alert and oriented, thought content appropriate, normal insight  Capillary Refill: Brisk,< 3 seconds   Peripheral Pulses: +2 palpable, equal bilaterally       Labs:   Recent Labs     01/25/23  0600 01/26/23  0405 01/26/23  1000 01/26/23  1615   WBC 4.7* 5.1  --   --    HGB 8.7* 8.4* 8.4* 8.3*   HCT 26.1* 26.3* 26.5* 25.5*    185  --   --      Recent Labs     01/25/23  1820 01/26/23  0405    136   K 4.0 4.0    102   CO2 25 24   BUN 4* 4*   CREATININE 0.6 0.7   CALCIUM 8.5 8.4*     No results for input(s): AST, ALT, BILIDIR, BILITOT, ALKPHOS in the last 72 hours. No results for input(s): INR in the last 72 hours. No results for input(s): [place] in the last 72 hours. Microbiology:      Urinalysis:      Lab Results   Component Value Date/Time    NITRU NEGATIVE 01/21/2023 09:50 AM    WBCUA 10-15 01/21/2023 09:50 AM    BACTERIA NONE SEEN 01/21/2023 09:50 AM    RBCUA 0-2 01/21/2023 09:50 AM    BLOODU NEGATIVE 01/21/2023 09:50 AM    SPECGRAV 1.013 08/12/2022 12:30 PM    GLUCOSEU NEGATIVE 01/21/2023 09:50 AM       Radiology:  XR CHEST (2 VW)    Result Date: 1/21/2023  Portable semierect AP upright and lateral chest views. Comparison: CTA chest 4/13/2022. 3/27/2022 2 view chest. Findings: Cardiac silhouette projects enlarged part of which is due to prominent mediastinal fat on comparison CTA chest. No pulmonary venous congestion. No consolidation, pneumothorax, or defined pleural effusion. Left upper extremity PICC line terminates at the superior vena cava. Proximal tracheostomy tube. No acute bony abnormality. Impression: No acute cardiopulmonary. This document has been electronically signed by: Tram Argueta Brit Push on 01/21/2023 04:56 AM    XR ABDOMEN (KUB) (SINGLE AP VIEW)    Result Date: 1/21/2023  Supine AP abdomen. Comparison: 1/10/2022 CT abdomen pelvis with contrast. Findings: Mild gaseous intestinal tract. No bowel obstruction. No free air. No apparent organomegaly. Properitoneal flank stripes are visualized. Psoas shadows are partially obscured due to technical factors. Chronic degenerative changes in the spine with levocurvature. Impression: No acute process. This document has been electronically signed by: Tram Argueta Brit Push on 01/21/2023 04:58 AM    CTA CHEST W WO CONTRAST    Result Date: 1/21/2023  PROCEDURE: CTA CHEST W WO CONTRAST CLINICAL INFORMATION: PE study Elevated D-dimer  tachycardia. COMPARISON: CTA chest dated 6 June 2022. . TECHNIQUE: 3 mm axial images were obtained through the chest after the administration of IV contrast.  A non-contrast localizer was obtained. 3D reconstructions were performed on the scanner to include MIP coronal and sagittal images through the chest. Isovue was the intravenous contrast utilized. All CT scans at this facility use dose modulation, iterative reconstruction, and/or weight-based dosing when appropriate to reduce radiation dose to as low as reasonably achievable. FINDINGS: There are filling defects in the right lower lobe pulmonary arterial branches consistent with pulmonary emboli. The left pulmonary artery appears be normal. There is mild dilatation of the main pulmonary artery. There is a tracheotomy tube in place. .  There is atherosclerotic calcification in the aortic arch. . There is borderline cardiomegaly. There is no evidence for right ventricular strain. There is increased attenuation in the left anterior descending coronary artery which could represent calcification versus stent placement. There is no pericardial or pleural effusion.   There is no mediastinal, axillary or hilar adenopathy. There is thickening off the interstitial lung markings. . There are no infiltrates. There is mild thoracic spondylosis. . There is a sclerotic density in the left eighth rib posteriorly. There is a small hiatal hernia. .      1. Filling defects in the right lower lobe pulmonary arterial branches consistent with acute pulmonary emboli. No evidence of right ventricular strain. 2.. Atherosclerotic calcification in the thoracic aorta. 3. Borderline cardiomegaly. Calcification versus stent placement in the left anterior descending coronary artery. 4. Thickening off the interstitial lung markings. 5. Thoracic spondylosis. 6. Sclerotic density in the left eighth rib posteriorly. 7. Small hiatal hernia. 9. Results called to [employer]   at 950 AM **This report has been created using voice recognition software. It may contain minor errors which are inherent in voice recognition technology. ** Final report electronically signed by DR Bob Zarate on 1/21/2023 9:57 AM    VL DUP LOWER EXTREMITY VENOUS BILATERAL    Result Date: 1/21/2023  PROCEDURE: VL DUP LOWER EXTREMITY VENOUS BILATERAL CLINICAL INFORMATION: Bilateral lower extremity edema. COMPARISON: No prior study. TECHNIQUE: Venous doppler ultrasound was performed of the bilateral lower extremities using gray scale, color flow and spectral doppler imaging. FINDINGS: There is normal color flow, spectral analysis and compressibility of the right and left common femoral vein, femoral vein and popliteal veins. There is abnormal echogenicity in the right posterior tibial vein consistent with thrombus. There is abnormal echogenicity in the left greater saphenous, peroneal and possibly left anterior tibial veins, suspicious for thrombus. . There is normal color flow and compressibility in the left posterior tibial veins, right anterior tibial veins and right peroneal veins.      1. Areas of abnormal echogenicity in the right posterior tibial vein, left greater saphenous, left peroneal and left anterior tibial veins suspicious for thrombi. **This report has been created using voice recognition software. It may contain minor errors which are inherent in voice recognition technology. ** Final report electronically signed by DR Tuyet Ortiz on 1/21/2023 11:28 AM      DVT prophylaxis: [] Lovenox                                 [] SCDs                                 [] SQ Heparin                                 [] Encourage ambulation           [x] Already on Anticoagulation     Code Status: Full Code    PT/OT Eval Status: None    Tele:   [x] yes             [] no    NSR with intermittent ST    Active Hospital Problems    Diagnosis Date Noted    PE (pulmonary thromboembolism) ([facility] [address] [I26.99] 01/25/2023     Priority: Medium    Pulmonary embolism on right ([address] [I26.99] 01/21/2023     Priority: Medium    Nausea and vomiting [R11.2] 01/21/2023     Priority: Medium    Abdominal bloating [R14.0] 01/21/2023     Priority: Medium    Deep vein thrombosis (DVT) of both lower extremities ([address] [I82.403] 01/21/2023     Priority: Medium    Hypotension [I95.9] 01/21/2023     Priority: Medium    Sinus tachycardia [R00.0] 01/21/2023     Priority: Medium    Leukocytosis [D72.829] 01/21/2023     Priority: Medium    Elevated troponin [R77.8] 01/21/2023     Priority: Medium    Bilateral leg edema [R60.0] 01/21/2023     Priority: Medium    Hyperlipidemia [E78.5] 01/21/2023     Priority: Medium    Tracheal stenosis [J39.8] 06/14/2022     Priority: Medium    Acute on chronic anemia [D64.9]      Priority: Medium    Tracheostomy in place [facility]) [Z93.0] 05/23/2022     Priority: Medium    Essential hypertension [I10] 01/14/2022    Type 2 diabetes mellitus with insulin therapy ([facility] [address] [E11.9, Z79.4] 01/14/2022    History of coronary artery stent placement [Z95.5] 01/14/2022       Electronically signed by WYATT Clark CNP on 1/26/2023 at 9:00 PM

## 2023-01-26 NOTE — CARE COORDINATION
1/26/23, 9:48 AM EST    DISCHARGE PLANNING EVALUATION    Received a call from [facility] with [facility] , updated her on patient's clinical status.

## 2023-01-26 NOTE — TELEPHONE ENCOUNTER
Patient is needing cardiac clearance appt r/s. She is currently in the hospital and getting ready to discharge. Patient is supposed to have her surgery 02/09, no available appts with cady prior to that.  Please advise

## 2023-01-26 NOTE — RT PROTOCOL NOTE
RT Inhaler-Nebulizer Bronchodilator Protocol Note    There is a bronchodilator order in the chart from a provider indicating to follow the RT Bronchodilator Protocol and there is an Initiate RT Inhaler-Nebulizer Bronchodilator Protocol order as well (see protocol at bottom of note). CXR Findings:  XR CHEST PORTABLE    Result Date: 1/25/2023  Interval insertion of right-sided PICC line with tip at the cavoatrial junction. **This report has been created using voice recognition software. It may contain minor errors which are inherent in voice recognition technology. ** Final report electronically signed by Dr. Fox Soriano MD on 1/25/2023 4:55 PM    XR CHEST PORTABLE    Result Date: 1/25/2023  1. Poor inflation of lungs. Tracheostomy tube. Moderate cardiomegaly. 2. No infiltrates or effusions are seen. 3. Left arm PICC line with catheter tip that appears to be in the left subclavian vein. **This report has been created using voice recognition software. It may contain minor errors which are inherent in voice recognition technology. ** Final report electronically signed by Dr. Mohamud Pepper on 1/25/2023 10:11 AM      The findings from the last RT Protocol Assessment were as follows:   History Pulmonary Disease: None or smoker <15 pack years  Respiratory Pattern: Regular pattern and RR 12-20 bpm  Breath Sounds: Clear breath sounds  Cough: Strong, productive  Indication for Bronchodilator Therapy:    Bronchodilator Assessment Score: 1    Aerosolized bronchodilator medication orders have been revised according to the RT Inhaler-Nebulizer Bronchodilator Protocol below. Respiratory Therapist to perform RT Therapy Protocol Assessment initially then follow the protocol. Repeat RT Therapy Protocol Assessment PRN for score 0-3 or on second treatment, BID, and PRN for scores above 3.     No Indications - adjust the frequency to every 6 hours PRN wheezing or bronchospasm, if no treatments needed after 48 hours then discontinue using Per Protocol order mode. If indication present, adjust the RT bronchodilator orders based on the Bronchodilator Assessment Score as indicated below. Use Inhaler orders unless patient has one or more of the following: on home nebulizer, not able to hold breath for 10 seconds, is not alert and oriented, cannot activate and use MDI correctly, or respiratory rate 25 breaths per minute or more, then use the equivalent nebulizer order(s) with same Frequency and PRN reasons based on the score. If a patient is on this medication at home then do not decrease Frequency below that used at home. 0-3 - enter or revise RT bronchodilator order(s) to equivalent RT Bronchodilator order with Frequency of every 4 hours PRN for wheezing or increased work of breathing using Per Protocol order mode. 4-6 - enter or revise RT Bronchodilator order(s) to two equivalent RT bronchodilator orders with one order with BID Frequency and one order with Frequency of every 4 hours PRN wheezing or increased work of breathing using Per Protocol order mode. 7-10 - enter or revise RT Bronchodilator order(s) to two equivalent RT bronchodilator orders with one order with TID Frequency and one order with Frequency of every 4 hours PRN wheezing or increased work of breathing using Per Protocol order mode. 11-13 - enter or revise RT Bronchodilator order(s) to one equivalent RT bronchodilator order with QID Frequency and an Albuterol order with Frequency of every 4 hours PRN wheezing or increased work of breathing using Per Protocol order mode. Greater than 13 - enter or revise RT Bronchodilator order(s) to one equivalent RT bronchodilator order with every 4 hours Frequency and an Albuterol order with Frequency of every 2 hours PRN wheezing or increased work of breathing using Per Protocol order mode.      RT to enter RT Home Evaluation for COPD & MDI Assessment order using Per Protocol order mode.    Electronically signed by Cooper Yepez RCP on 1/25/2023 at 9:38 PM

## 2023-01-26 NOTE — CONSULTS
Department of Otolaryngology  Consult Note    Reason for Consult: Blood-tinged mucus, tooth abscess  Requesting Physician: WYATT Walls    CHIEF COMPLAINT: Left facial pain    History Obtained From:  patient, spouse, electronic medical record    HISTORY OF PRESENT ILLNESS:                The patient is a 81 y.o. female, who is known to ENT practice, presented to [facility] ER early in the morning on 2023. Patient worked up in the ER revealed bilateral lower extremity DVTs is a right lower lobe PE and patient was subsequently admitted. Patient started initially on IV heparin and transition to Xarelto a couple of days ago. There has been some bloody mucus within her tracheostomy inner cannula the last few days and ENT was contacted to provide recommendations. Patient reports that the blood was thicker a few days ago, but within the last day and a half it has been gradually improving. She denies any chest pain, shortness of breath (from baseline), fevers, chills, N/V. Began to report some significant left-sided facial pain that started yesterday evening. She reports that the left side of her face feels swollen as well. Patient was started on Unasyn and CT facial bones ordered to rule out dental infection. Patient reports that the pain is so severe on the left side of her face that she has difficulty localizing it to upper vs lower jaw vs elsewhere.     Past Medical History:        Diagnosis Date    Arthritis     Coronary artery disease     COVID     Hyperlipidemia     Primary hypertension     Type 2 diabetes mellitus ([facility] [address]        Past Surgical History:        Procedure Laterality Date    CARDIAC SURGERY      CATARACT REMOVAL Bilateral      SECTION      3 times    CORONARY ANGIOPLASTY WITH STENT PLACEMENT      stenting twice    EYE SURGERY      HIATAL HERNIA REPAIR      late     HYSTERECTOMY, TOTAL ABDOMINAL (CERVIX REMOVED)      in     LARYNGOSCOPY N/A 3/22/2022    SUSPENSON LARYNGOSCOPY WITH JET VENT, DILATION performed by Marcelo Jaquez MD at [address]  N/A 3/28/2022    SUSPENSION MICROLARYNGOSCOPY WITH BALLOON DILATION AND JET VENT, KENALOG INJECTION performed by Marcelo Jaquez MD at [address] N/A 4/7/2022    Suspension Laryngoscopy  Lateral of Right True Vocal Cord, With Steroid Injection of Larynx And Tracheostomy Tube Change, debridement performed by Bharat Walters MD at [address]  N/A 8/10/2022    TRANSORAL LARYNGOPLASTY WITH LEFT PARTIAL ARYTENODECTOMY AND POSS LATERALIZATION, ADVANCEMENT FLAP RECONSTRUCTION WITH JET VENT,THERAPUTIC BRONCHOSCOPY WITH DEBRIEDMENT,WITH BALLOON DILITATION performed by Bharat Walters MD at [address]  N/A 8/31/2022    Laryngoscopy with Dilation Debridement  Bronchoscopy with Dilation & Resection of Obstructing Tissues Transoral Laryngoplasty Left True Vocal Fold Lateralization left partial aryteniodectomy performed by Bharat Walters MD at [address]      [place] [address] 4/1/2022    TRACHEOTOMY TUBE REPLACEMENT performed by Marcelo Jaquez MD at [facility]       Current Medications:   Current Facility-Administered Medications: lidocaine 4 % external patch 1 patch, 1 patch, TransDERmal, Daily  [START ON 1/27/2023] lisinopril (PRINIVIL;ZESTRIL) tablet 5 mg, 5 mg, Oral, Daily  ampicillin-sulbactam (UNASYN) 3,000 mg in sodium chloride 0.9 % 100 mL IVPB (mini-bag), 3,000 mg, IntraVENous, 4 times per day  lidocaine (LMX) 4 % cream, , Topical, PRN  HYDROcodone-acetaminophen (NORCO) 5-325 MG per tablet 1 tablet, 1 tablet, Oral, Q6H PRN  docusate sodium (COLACE) capsule 100 mg, 100 mg, Oral, BID  senna (SENOKOT) tablet 8.6 mg, 1 tablet, Oral, Nightly  polyethylene glycol (GLYCOLAX) packet 17 g, 17 g, Oral, Daily  lidocaine PF 1 % injection 5 mL, 5 mL, IntraDERmal, Once  sodium chloride flush 0.9 % injection 5-40 mL, 5-40 mL, IntraVENous, 2 times per day  sodium chloride flush 0.9 % injection 5-40 mL, 5-40 mL, IntraVENous, PRN  0.9 % sodium chloride infusion, 25 mL, IntraVENous, PRN  albuterol (PROVENTIL) nebulizer solution 2.5 mg, 2.5 mg, Nebulization, Q4H PRN  metoprolol succinate (TOPROL XL) extended release tablet 25 mg, 25 mg, Oral, Daily  [Held by provider] rivaroxaban (XARELTO) tablet 15 mg, 15 mg, Oral, BID WC  ondansetron (ZOFRAN-ODT) disintegrating tablet 4 mg, 4 mg, Oral, Q8H PRN **OR** ondansetron (ZOFRAN) injection 4 mg, 4 mg, IntraVENous, Q6H PRN  polyethylene glycol (GLYCOLAX) packet 17 g, 17 g, Oral, Daily PRN  acetaminophen (TYLENOL) tablet 650 mg, 650 mg, Oral, Q6H PRN **OR** acetaminophen (TYLENOL) suppository 650 mg, 650 mg, Rectal, Q6H PRN  albuterol sulfate HFA (PROVENTIL;VENTOLIN;PROAIR) 108 (90 Base) MCG/ACT inhaler 2 puff, 2 puff, Inhalation, Q6H PRN  aspirin chewable tablet 81 mg, 81 mg, Oral, Daily  furosemide (LASIX) tablet 40 mg, 40 mg, Oral, Daily  nitroGLYCERIN (NITROSTAT) SL tablet 0.4 mg, 0.4 mg, SubLINGual, Q5 Min PRN  rosuvastatin (CRESTOR) tablet 10 mg, 10 mg, Oral, Daily  simethicone (MYLICON) chewable tablet 80 mg, 80 mg, Oral, 4x Daily  insulin lispro (HUMALOG) injection vial 0-4 Units, 0-4 Units, SubCUTAneous, TID WC  insulin lispro (HUMALOG) injection vial 0-4 Units, 0-4 Units, SubCUTAneous, Nightly  glucose chewable tablet 16 g, 4 tablet, Oral, PRN  dextrose bolus 10% 125 mL, 125 mL, IntraVENous, PRN **OR** dextrose bolus 10% 250 mL, 250 mL, IntraVENous, PRN  glucagon (rDNA) injection 1 mg, 1 mg, SubCUTAneous, PRN  dextrose 10 % infusion, , IntraVENous, Continuous PRN  insulin glargine (LANTUS) injection vial 22 Units, 22 Units, SubCUTAneous, Nightly    Allergies:     Allergies   Allergen Reactions    Metformin And Related Other (See Comments)     diarrhea    Codeine Nausea And Vomiting    Meperidine Hcl Nausea And Vomiting    Sulfa Antibiotics Nausea And Vomiting    Sumatriptan Nausea And Vomiting        Social History:    TOBACCO:   reports that she has never smoked. She has never used smokeless tobacco.  ETOH:   reports that she does not currently use alcohol. DRUGS:   reports no history of drug use. Family History:       Problem Relation Age of Onset    Parkinson's Disease Father     Lung Cancer Father        REVIEW OF SYSTEMS:    12 point ROS completed. Pertinent positives noted, ROS is otherwise negative unless stated    PHYSICAL EXAM:    VITALS:  /64   Pulse 100   Temp 98.2 °F (36.8 °C) (Oral)   Resp 18   Ht 5' 2\" (1.575 m)   Wt 210 lb 12.2 oz (95.6 kg)   LMP  (LMP Unknown)   SpO2 100%   BMI 38.55 kg/m²     This is a 81 y.o. obese female. Patient is alert and cooperative. Patient appears well developed, well nourished. Mood is happy with normal affect. Not obviously hearing impaired. Head:   Normocephalic, atraumatic. Patient reports swelling of the left maxillary area, but no palpable induration or edema is noted with palpation. No overlying erythema. No obvious asymmetry in the cheeks are noted, but examination palpation is somewhat limited secondary to body habitus. Ears:  External ears: Normal: no scars, lesions or masses. Mastoid process: No erythema noted. No tenderness to palpation. Nose:    External nose: Appears midline. No obvious deformity or masses. Mucosa:  clear  Turbinates: normal and pink            Discharge:  clear    Mouth/Throat:  Lips, tongue and oral cavity: Normal. No masses or lesions noted   Dentition: Fair to poor remaining dentition. Jorde of dental amalgam have caps or fillings. patient reports mild tenderness to percussion around tooth #14 and #15, but not obviously infected appearing. No trismus noted  Oral mucosa: moist  Oropharynx: normal-appearing mucosa  Hard and soft palates: symmetrical and intact. Salivary glands: The left submandibular gland feels slightly larger compared to the right on palpation.   The area does not seem tender to palpation and no associated induration, fluctuance, or overlying erythema  Uvula: midline, no obvious lesions   Gag reflex is present    Neck: Trachea midline. Tracheostomy in place and secured with trach ties. Inner cannula removed which revealed a scant amount of dark crust, but no significant scabbing or pink-tinged mucus is visible at this time. No visible bloody discharge is noted via transillumination through tracheostomy. No palpable edema of the neck and submandibular areas  Lymphatic: No palpable cervical lymphadenopathy noted. Eyes: ERLIN, EOM intact. Conjunctiva moist without discharge. Lungs: Normal effort of breathing, not obviously distressed. Neuro: Cranial nerves II-XII grossly intact. Extremities: No clubbing, edema, or cyanosis noted. DATA:    CBC with Differential:    Lab Results   Component Value Date/Time    WBC 5.1 01/26/2023 04:05 AM    RBC 2.65 01/26/2023 04:05 AM    HGB 8.3 01/26/2023 04:15 PM    HCT 25.5 01/26/2023 04:15 PM     01/26/2023 04:05 AM    MCV 99.2 01/26/2023 04:05 AM    MCH 31.7 01/26/2023 04:05 AM    MCHC 31.9 01/26/2023 04:05 AM    NRBC 0 01/26/2023 04:05 AM    SEGSPCT 58.7 01/26/2023 04:05 AM    MONOPCT 13.4 01/26/2023 04:05 AM    MONOSABS 0.7 01/26/2023 04:05 AM    LYMPHSABS 1.2 01/26/2023 04:05 AM    EOSABS 0.1 01/26/2023 04:05 AM    BASOSABS 0.0 01/26/2023 04:05 AM     BMP:    Lab Results   Component Value Date/Time     01/26/2023 04:05 AM    K 4.0 01/26/2023 04:05 AM     01/26/2023 04:05 AM    CO2 24 01/26/2023 04:05 AM    BUN 4 01/26/2023 04:05 AM    LABALBU 4.3 01/21/2023 04:15 AM    CREATININE 0.7 01/26/2023 04:05 AM    CALCIUM 8.4 01/26/2023 04:05 AM    LABGLOM >60 01/26/2023 04:05 AM    GLUCOSE 167 01/26/2023 04:05 AM       IMPRESSION/RECOMMENDATIONS:      Hemoptysis, left facial pain    -Patient has noticed gradual improvement in the degree of hemoptysis over the last day and a half.   Patient reports that she was not receiving humidified oxygen via trach mask prior to then. Bleeding may be from the mucosa being dried and trach care. Okay to continue anticoagulation, but will continue to monitor for recurrence/worsened hemoptysis  -Patient was scheduled for cardiac clearance today, but appears she has been rescheduled for 2/2/23 in preparation for surgery with Dr Luly Fletcher. Patient will need to be off NOAC for surgery. She can likely start Heparin/lovenox 24-48 hours post-op. May have to consider postponing the surgery if this is not an option given her DVT/PE situation, but will discuss further with Dr Luly Fletcher  -No obvious infection on exam at this time. Uncertain etiology of facial pain. No neurologic deficits are noted with cranial nerve testing and no weakness of upper/lower extremities noted.  Will await findings of CT  -ENT will continue to follow     Electronically signed by MICHAEL Toribio on 1/26/2023 at 4:17 PM

## 2023-01-26 NOTE — PROGRESS NOTES
[facility] 8B  Occupational Therapy  Daily Note  Time:   Time In: 4317  Time Out: 1331  Timed Code Treatment Minutes: 24 Minutes  Minutes: 24          Date: 2023  Patient Name: Ced Rangel,   Gender: female      Room: 8B-34/034-A  MRN: [id number]  : 1950  (81 y.o.)  Referring Practitioner: MICHAEL Newberry  Diagnosis: Abdominal Bloating  Additional Pertinent Hx: Pt with history of DM, HTN, COVID 2020 s/p tracheostomy 2022 follows with Dr. Kennedy Lora, presented to [facility] ED for complaints of abdominal bloating with nausea/vomiting. Restrictions/Precautions:  Restrictions/Precautions: Fall Risk  Position Activity Restriction  Other position/activity restrictions: Trach collar with moist air     SUBJECTIVE: Nurse ok'd session. Patient call light on upon arrival requesting to use bathroom. Agreeable to OT session    PAIN: Complains of pain in L side of cheek, nurse notified    Vitals: Shay Farhana not assessed per clinical judgement, see nursing flowsheet    COGNITION: Decreased Insight and Decreased Safety Awareness    ADL:   Toileting: Moderate Assistance. For hygiene, and clothing management. Increased time with use of bathroom  Toilet Transfer: [address] To/from STS using grab bar with min verbal cues for technique. Patient adamantly declined use of footwear during mobility despite education on the need to wear them     BALANCE:  Sitting Balance:  Supervision. Standing Balance: Contact Guard Assistance. Static standing x2 minutes x2 trials with BUE support on walker    BED MOBILITY:  Rolling to Right: Supervision    Supine to Sit: Moderate Assistance - increased time, use of side rail. TRANSFERS:  Sit to Stand:  Contact Guard Assistance. From various surfaces with increased time and cues for safe technique  Stand to Sit: Contact Guard Assistance.  To various surfaces with increased time and cues for safe technique    FUNCTIONAL MOBILITY:  Assistive Device: Rolling Walker  Assist Level:  Contact Guard Assistance. Distance: To and from bathroom  Moderate verbal cues for safe technique. Patient rests forearms on  of walker. ASSESSMENT:     Activity Tolerance:  Patient tolerance of  treatment: fair. Discharge Recommendations: Continue to assess pending progress and Home with Home Health OT  Equipment Recommendations: Equipment Needed: Yes  Other: dressing stick, magnification adaptations  Plan: Times Per Week: 5x  Specific Instructions for Next Treatment: Functional mobility; ADLs and standing tolerance; fine motor tasks and adaptations; UE exercises  Current Treatment Recommendations: Strengthening, Functional mobility training, Endurance training, Self-Care / ADL, Safety education & training  Additional Comments: Pt would benefit from continued skilled OT services when medically stable and discharged from Acute. HHOT recommended. Patient Education  Patient Education:  safety with transfers and mobility, ADL strategies    Goals  Short Term Goals  Time Frame for Short Term Goals: By discharge  Short Term Goal 1: Pt will complete simple ADLs while standing for over 2 minute duration while using 1-2 hand release to increase her safety and endurance for ease of dressing or grooming. Short Term Goal 2: Pt will demonstrate functional mobility walking to/from the bathroom with SBA and verbal cues for posture as needed to prepare for doing self care at the sink or her toileting routine. Short Term Goal 3: Pt will complete fine motor tasks while using any adaptations or stabilizing strategy needed to increase her coordination for ease of doing ADLs and cooking or crafts. Short Term Goal 4: Pt will complete BUE ROM/light resistance exercises while following any handout or demonstration needed to increase her endurance and strength for ease of doing ADLs and functional mobility.   Additional Goals?: No    Following session, patient left in safe position with all fall risk precautions in place.

## 2023-01-26 NOTE — PROGRESS NOTES
Physician Progress Note      Lee Ann Hackett  CSN #:                  [id number]  :                       1950  ADMIT DATE:       2023 2:19 AM  DISCH DATE:  RESPONDING  PROVIDER #:        Nilda Reyes CNP          QUERY TEXTEduardo Hancock,    Pt admitted with Pulmonary Embolism & has tracheal stenosis -surgery is   planned for 2023  and has respiratory failure documented. Pt prescribed   home oxygen 2L continuous trach. Pt currently requiring 5L continuous Trach   Mask. If possible, please document in progress notes and discharge summary   further specificity regarding the type and acuity of respiratory failure: The medical record reflects the following:  Risk Factors:  home oxygen 2L continuous trach  Clinical Indicators:  tracheal stenosis -surgery is planned for 2023  and   has respiratory failure documented. Treatment:  Pt currently requiring 5L continuous Trach Mask to maintain SPO2 >   94%. Options provided:  -- Acute on chronic respiratory failure with hypoxia  -- Chronic respiratory failure with hypoxia  -- Other - I will add my own diagnosis  -- Disagree - Not applicable / Not valid  -- Disagree - Clinically unable to determine / Unknown  -- Refer to Clinical Documentation Reviewer    PROVIDER RESPONSE TEXT:    This patient has chronic respiratory failure with hypoxia.     Query created by: Edmund Jeff on 2023 6:30 AM      Electronically signed by:  Nilda Reyes CNP 2023 6:43 AM

## 2023-01-27 LAB
ANION GAP SERPL CALC-SCNC: 10 MEQ/L (ref 8–16)
BASOPHILS ABSOLUTE: 0 THOU/MM3 (ref 0–0.1)
BASOPHILS NFR BLD AUTO: 0.5 %
BUN SERPL-MCNC: 6 MG/DL (ref 7–22)
CALCIUM SERPL-MCNC: 8.3 MG/DL (ref 8.5–10.5)
CHLORIDE SERPL-SCNC: 102 MEQ/L (ref 98–111)
CO2 SERPL-SCNC: 24 MEQ/L (ref 23–33)
CREAT SERPL-MCNC: 0.7 MG/DL (ref 0.4–1.2)
DEPRECATED RDW RBC AUTO: 58.7 FL (ref 35–45)
EOSINOPHIL NFR BLD AUTO: 1.6 %
EOSINOPHILS ABSOLUTE: 0.1 THOU/MM3 (ref 0–0.4)
ERYTHROCYTE [DISTWIDTH] IN BLOOD BY AUTOMATED COUNT: 15.9 % (ref 11.5–14.5)
FERRITIN SERPL IA-MCNC: 790 NG/ML (ref 10–291)
FOLATE SERPL-MCNC: 7.9 NG/ML (ref 4.8–24.2)
GFR SERPL CREATININE-BSD FRML MDRD: > 60 ML/MIN/1.73M2
GLUCOSE BLD STRIP.AUTO-MCNC: 142 MG/DL (ref 70–108)
GLUCOSE BLD STRIP.AUTO-MCNC: 181 MG/DL (ref 70–108)
GLUCOSE BLD STRIP.AUTO-MCNC: 261 MG/DL (ref 70–108)
GLUCOSE SERPL-MCNC: 221 MG/DL (ref 70–108)
HCT VFR BLD AUTO: 27.3 % (ref 37–47)
HCT VFR BLD AUTO: 29.5 % (ref 37–47)
HGB BLD-MCNC: 8.8 GM/DL (ref 12–16)
HGB BLD-MCNC: 9.4 GM/DL (ref 12–16)
HGB RETIC QN AUTO: 30.8 PG (ref 28.2–35.7)
IMM GRANULOCYTES # BLD AUTO: 0.14 THOU/MM3 (ref 0–0.07)
IMM GRANULOCYTES NFR BLD AUTO: 2.3 %
IMM RETICS NFR: 38 % (ref 3–15.9)
IRON SATN MFR SERPL: 25 % (ref 20–50)
IRON SERPL-MCNC: 46 UG/DL (ref 50–170)
LYMPHOCYTES ABSOLUTE: 1.2 THOU/MM3 (ref 1–4.8)
LYMPHOCYTES NFR BLD AUTO: 19.3 %
MCH RBC QN AUTO: 32.7 PG (ref 26–33)
MCHC RBC AUTO-ENTMCNC: 32.2 GM/DL (ref 32.2–35.5)
MCV RBC AUTO: 101.5 FL (ref 81–99)
MONOCYTES ABSOLUTE: 0.7 THOU/MM3 (ref 0.4–1.3)
MONOCYTES NFR BLD AUTO: 11.3 %
MRSA DNA SPEC QL NAA+PROBE: NEGATIVE
NEUTROPHILS NFR BLD AUTO: 65 %
NRBC BLD AUTO-RTO: 0 /100 WBC
PLATELET # BLD AUTO: 197 THOU/MM3 (ref 130–400)
PMV BLD AUTO: 9.5 FL (ref 9.4–12.4)
POTASSIUM SERPL-SCNC: 4.4 MEQ/L (ref 3.5–5.2)
RBC # BLD AUTO: 2.69 MILL/MM3 (ref 4.2–5.4)
RETICS # AUTO: 105 THOU/MM3 (ref 20–115)
RETICS/RBC NFR AUTO: 3.9 % (ref 0.5–2)
SEGMENTED NEUTROPHILS ABSOLUTE COUNT: 4 THOU/MM3 (ref 1.8–7.7)
SODIUM SERPL-SCNC: 136 MEQ/L (ref 135–145)
TIBC SERPL-MCNC: 183 UG/DL (ref 171–450)
VIT B12 SERPL-MCNC: 1880 PG/ML (ref 211–911)
WBC # BLD AUTO: 6.1 THOU/MM3 (ref 4.8–10.8)

## 2023-01-27 PROCEDURE — 80048 BASIC METABOLIC PNL TOTAL CA: CPT

## 2023-01-27 PROCEDURE — 85025 COMPLETE CBC W/AUTO DIFF WBC: CPT

## 2023-01-27 PROCEDURE — 2580000003 HC RX 258

## 2023-01-27 PROCEDURE — 83550 IRON BINDING TEST: CPT

## 2023-01-27 PROCEDURE — 99231 SBSQ HOSP IP/OBS SF/LOW 25: CPT | Performed by: PHYSICIAN ASSISTANT

## 2023-01-27 PROCEDURE — 36415 COLL VENOUS BLD VENIPUNCTURE: CPT

## 2023-01-27 PROCEDURE — 82948 REAGENT STRIP/BLOOD GLUCOSE: CPT

## 2023-01-27 PROCEDURE — 1200000000 HC SEMI PRIVATE

## 2023-01-27 PROCEDURE — 85018 HEMOGLOBIN: CPT

## 2023-01-27 PROCEDURE — 6370000000 HC RX 637 (ALT 250 FOR IP)

## 2023-01-27 PROCEDURE — 6370000000 HC RX 637 (ALT 250 FOR IP): Performed by: STUDENT IN AN ORGANIZED HEALTH CARE EDUCATION/TRAINING PROGRAM

## 2023-01-27 PROCEDURE — 85014 HEMATOCRIT: CPT

## 2023-01-27 PROCEDURE — 99232 SBSQ HOSP IP/OBS MODERATE 35: CPT

## 2023-01-27 PROCEDURE — 82607 VITAMIN B-12: CPT

## 2023-01-27 PROCEDURE — 82746 ASSAY OF FOLIC ACID SERUM: CPT

## 2023-01-27 PROCEDURE — 6360000002 HC RX W HCPCS

## 2023-01-27 PROCEDURE — 6370000000 HC RX 637 (ALT 250 FOR IP): Performed by: PHYSICIAN ASSISTANT

## 2023-01-27 PROCEDURE — 85046 RETICYTE/HGB CONCENTRATE: CPT

## 2023-01-27 PROCEDURE — 83540 ASSAY OF IRON: CPT

## 2023-01-27 PROCEDURE — 82728 ASSAY OF FERRITIN: CPT

## 2023-01-27 PROCEDURE — 87641 MR-STAPH DNA AMP PROBE: CPT

## 2023-01-27 PROCEDURE — 6370000000 HC RX 637 (ALT 250 FOR IP): Performed by: FAMILY MEDICINE

## 2023-01-27 RX ORDER — SODIUM PHOSPHATE, DIBASIC AND SODIUM PHOSPHATE, MONOBASIC 3.5; 9.5 G/66ML; G/66ML
ENEMA RECTAL ONCE AS NEEDED
Status: DISCONTINUED | OUTPATIENT
Start: 2023-01-27 | End: 2023-01-29 | Stop reason: HOSPADM

## 2023-01-27 RX ORDER — HYDROCODONE BITARTRATE AND ACETAMINOPHEN 5; 325 MG/1; MG/1
1 TABLET ORAL EVERY 6 HOURS PRN
Status: DISCONTINUED | OUTPATIENT
Start: 2023-01-27 | End: 2023-01-29 | Stop reason: HOSPADM

## 2023-01-27 RX ORDER — DOCUSATE SODIUM 100 MG/1
200 CAPSULE, LIQUID FILLED ORAL 2 TIMES DAILY
Status: DISCONTINUED | OUTPATIENT
Start: 2023-01-28 | End: 2023-01-29 | Stop reason: HOSPADM

## 2023-01-27 RX ORDER — INSULIN LISPRO 100 [IU]/ML
0-4 INJECTION, SOLUTION INTRAVENOUS; SUBCUTANEOUS NIGHTLY
Status: DISCONTINUED | OUTPATIENT
Start: 2023-01-27 | End: 2023-01-29

## 2023-01-27 RX ORDER — INSULIN LISPRO 100 [IU]/ML
0-16 INJECTION, SOLUTION INTRAVENOUS; SUBCUTANEOUS
Status: DISCONTINUED | OUTPATIENT
Start: 2023-01-27 | End: 2023-01-29

## 2023-01-27 RX ORDER — LACTULOSE 10 G/15ML
20 SOLUTION ORAL 3 TIMES DAILY
Status: DISCONTINUED | OUTPATIENT
Start: 2023-01-27 | End: 2023-01-28

## 2023-01-27 RX ADMIN — SENNOSIDES 8.6 MG: 8.6 TABLET, FILM COATED ORAL at 19:59

## 2023-01-27 RX ADMIN — SODIUM CHLORIDE, PRESERVATIVE FREE 10 ML: 5 INJECTION INTRAVENOUS at 20:04

## 2023-01-27 RX ADMIN — HYDROCODONE BITARTRATE AND ACETAMINOPHEN 1 TABLET: 5; 325 TABLET ORAL at 13:39

## 2023-01-27 RX ADMIN — POLYETHYLENE GLYCOL 3350 17 G: 17 POWDER, FOR SOLUTION ORAL at 09:15

## 2023-01-27 RX ADMIN — ROSUVASTATIN 10 MG: 10 TABLET, FILM COATED ORAL at 09:07

## 2023-01-27 RX ADMIN — LACTULOSE 20 G: 20 SOLUTION ORAL at 13:40

## 2023-01-27 RX ADMIN — SIMETHICONE 80 MG: 80 TABLET, CHEWABLE ORAL at 09:21

## 2023-01-27 RX ADMIN — RIVAROXABAN 15 MG: 15 TABLET, FILM COATED ORAL at 09:05

## 2023-01-27 RX ADMIN — RIVAROXABAN 15 MG: 15 TABLET, FILM COATED ORAL at 17:17

## 2023-01-27 RX ADMIN — HYDROCODONE BITARTRATE AND ACETAMINOPHEN 1 TABLET: 5; 325 TABLET ORAL at 20:00

## 2023-01-27 RX ADMIN — DOCUSATE SODIUM 100 MG: 100 CAPSULE, LIQUID FILLED ORAL at 09:15

## 2023-01-27 RX ADMIN — INSULIN GLARGINE 22 UNITS: 100 INJECTION, SOLUTION SUBCUTANEOUS at 21:50

## 2023-01-27 RX ADMIN — LACTULOSE 20 G: 20 SOLUTION ORAL at 19:59

## 2023-01-27 RX ADMIN — SODIUM CHLORIDE 3000 MG: 900 INJECTION INTRAVENOUS at 17:20

## 2023-01-27 RX ADMIN — HYDROCODONE BITARTRATE AND ACETAMINOPHEN 1 TABLET: 5; 325 TABLET ORAL at 07:48

## 2023-01-27 RX ADMIN — SODIUM CHLORIDE 3000 MG: 900 INJECTION INTRAVENOUS at 13:44

## 2023-01-27 RX ADMIN — FUROSEMIDE 40 MG: 40 TABLET ORAL at 09:06

## 2023-01-27 RX ADMIN — SODIUM CHLORIDE 3000 MG: 900 INJECTION INTRAVENOUS at 00:20

## 2023-01-27 RX ADMIN — METOPROLOL SUCCINATE 25 MG: 25 TABLET, EXTENDED RELEASE ORAL at 09:06

## 2023-01-27 RX ADMIN — SIMETHICONE 80 MG: 80 TABLET, CHEWABLE ORAL at 13:39

## 2023-01-27 RX ADMIN — SIMETHICONE 80 MG: 80 TABLET, CHEWABLE ORAL at 22:09

## 2023-01-27 RX ADMIN — ASPIRIN 81 MG 81 MG: 81 TABLET ORAL at 09:15

## 2023-01-27 RX ADMIN — SODIUM CHLORIDE 3000 MG: 900 INJECTION INTRAVENOUS at 05:46

## 2023-01-27 RX ADMIN — DOCUSATE SODIUM 100 MG: 100 CAPSULE, LIQUID FILLED ORAL at 19:59

## 2023-01-27 RX ADMIN — SIMETHICONE 80 MG: 80 TABLET, CHEWABLE ORAL at 17:17

## 2023-01-27 RX ADMIN — LISINOPRIL 5 MG: 5 TABLET ORAL at 09:06

## 2023-01-27 RX ADMIN — SODIUM CHLORIDE, PRESERVATIVE FREE 10 ML: 5 INJECTION INTRAVENOUS at 09:16

## 2023-01-27 RX ADMIN — INSULIN LISPRO 4 UNITS: 100 INJECTION, SOLUTION INTRAVENOUS; SUBCUTANEOUS at 09:16

## 2023-01-27 ASSESSMENT — PAIN DESCRIPTION - ORIENTATION
ORIENTATION: LEFT

## 2023-01-27 ASSESSMENT — PAIN SCALES - GENERAL
PAINLEVEL_OUTOF10: 8
PAINLEVEL_OUTOF10: 3
PAINLEVEL_OUTOF10: 8
PAINLEVEL_OUTOF10: 9
PAINLEVEL_OUTOF10: 8

## 2023-01-27 ASSESSMENT — PAIN DESCRIPTION - LOCATION
LOCATION: FACE
LOCATION: FACE;JAW
LOCATION: JAW;FACE

## 2023-01-27 ASSESSMENT — PAIN DESCRIPTION - DESCRIPTORS
DESCRIPTORS: PRESSURE;ACHING;SHARP
DESCRIPTORS: ACHING;DISCOMFORT
DESCRIPTORS: ACHING

## 2023-01-27 ASSESSMENT — PAIN SCALES - WONG BAKER: WONGBAKER_NUMERICALRESPONSE: 2

## 2023-01-27 NOTE — PLAN OF CARE
Problem: Discharge Planning  Goal: Discharge to home or other facility with appropriate resources  Outcome: Progressing  Flowsheets (Taken 1/26/2023 0835)  Discharge to home or other facility with appropriate resources: Identify barriers to discharge with patient and caregiver     Problem: Skin/Tissue Integrity  Goal: Absence of new skin breakdown  Description: 1. Monitor for areas of redness and/or skin breakdown  2. Assess vascular access sites hourly  3. Every 4-6 hours minimum:  Change oxygen saturation probe site  4. Every 4-6 hours:  If on nasal continuous positive airway pressure, respiratory therapy assess nares and determine need for appliance change or resting period.   Outcome: Progressing     Problem: Safety - Adult  Goal: Free from fall injury  Outcome: Progressing  Flowsheets (Taken 1/26/2023 0835)  Free From Fall Injury: Instruct family/caregiver on patient safety     Problem: Chronic Conditions and Co-morbidities  Goal: Patient's chronic conditions and co-morbidity symptoms are monitored and maintained or improved  Outcome: Progressing  Flowsheets (Taken 1/26/2023 0835)  Care Plan - Patient's Chronic Conditions and Co-Morbidity Symptoms are Monitored and Maintained or Improved:   Monitor and assess patient's chronic conditions and comorbid symptoms for stability, deterioration, or improvement   Collaborate with multidisciplinary team to address chronic and comorbid conditions and prevent exacerbation or deterioration   Update acute care plan with appropriate goals if chronic or comorbid symptoms are exacerbated and prevent overall improvement and discharge     Problem: Pain  Goal: Verbalizes/displays adequate comfort level or baseline comfort level  Outcome: Progressing     Problem: Metabolic/Fluid and Electrolytes - Adult  Goal: Glucose maintained within prescribed range  Outcome: Progressing  Flowsheets (Taken 1/26/2023 0835)  Glucose maintained within prescribed range: Monitor blood glucose as ordered

## 2023-01-27 NOTE — PLAN OF CARE
Problem: Pain  Goal: Verbalizes/displays adequate comfort level or baseline comfort level  Outcome: Not Progressing  Note: Pain continues 8/10 left facial     Problem: Discharge Planning  Goal: Discharge to home or other facility with appropriate resources  Outcome: Progressing     Problem: Skin/Tissue Integrity  Goal: Absence of new skin breakdown  Description: 1. Monitor for areas of redness and/or skin breakdown  2. Assess vascular access sites hourly  3. Every 4-6 hours minimum:  Change oxygen saturation probe site  4. Every 4-6 hours:  If on nasal continuous positive airway pressure, respiratory therapy assess nares and determine need for appliance change or resting period.   Outcome: Progressing     Problem: Safety - Adult  Goal: Free from fall injury  Outcome: Progressing     Problem: Chronic Conditions and Co-morbidities  Goal: Patient's chronic conditions and co-morbidity symptoms are monitored and maintained or improved  Outcome: Progressing     Problem: Metabolic/Fluid and Electrolytes - Adult  Goal: Glucose maintained within prescribed range  Outcome: Progressing     Problem: Pain  Goal: Verbalizes/displays adequate comfort level or baseline comfort level  Outcome: Not Progressing  Note: Pain continues 8/10 left facial

## 2023-01-27 NOTE — CARE COORDINATION
1/27/23, 2:24 PM EST    DISCHARGE ON GOING EVALUATION    Manish Vargas [facility] day: 2  Location: 8B-34/034-A Reason for admit: Abdominal bloating [R14.0]  Tachycardia [R00.0]  Other acute pulmonary embolism without acute cor pulmonale (HCC) [I26.99]  Pulmonary embolism, unspecified chronicity, unspecified pulmonary embolism type, unspecified whether acute cor pulmonale present (HCC) [I26.99]  Nausea and vomiting, unspecified vomiting type [R11.2]  PE (pulmonary thromboembolism) ([facility] [address] [I26.99]   Procedure: No.  Barriers to Discharge: Cont on Xarelto. Complained of some facial pain. ENT consulted and has seen. Empiric antibiotic, Unasyn. CT scan unremarkable. Afebrile. PCP: Pippa Conklin DO  Readmission Risk Score: 23.6%  Patient Goals/Plan/Treatment Preferences: Anticipate discharge tomorrow. SW following for continuity of services.

## 2023-01-27 NOTE — PROGRESS NOTES
Department of Otolaryngology  Progress Note    Chief Complaint:  Left facial pain    Interval history 1/27/23:  Patient reports that she is doing ok today. She reports persistent left sided facial pain and swelling. CT facial bones was negative for abscess per radiology. CBC remains WNL, 6.1 today. She states that she has been cleaning small amounts of old blood from her stoma, but no bright red blood/pink tinged mucous. She denies shortness of breath, fevers, chills. No other symptoms or concerns. Initial ENT HPI 1/26/23: The patient is a 81 y.o. female, who is known to ENT practice, presented to [facility] ER early in the morning on 1/21/2023. Patient worked up in the ER revealed bilateral lower extremity DVTs is a right lower lobe PE and patient was subsequently admitted. Patient started initially on IV heparin and transition to Xarelto a couple of days ago. There has been some bloody mucus within her tracheostomy inner cannula the last few days and ENT was contacted to provide recommendations. Patient reports that the blood was thicker a few days ago, but within the last day and a half it has been gradually improving. She denies any chest pain, shortness of breath (from baseline), fevers, chills, N/V. Began to report some significant left-sided facial pain that started yesterday evening. She reports that the left side of her face feels swollen as well. Patient was started on Unasyn and CT facial bones ordered to rule out dental infection. Patient reports that the pain is so severe on the left side of her face that she has difficulty localizing it to upper vs lower jaw vs elsewhere. REVIEW OF SYSTEMS:    A complete multi-organ review of systems was performed and reviewed by me.   ENT:  negative except as noted in HPI  CONSTITUTIONAL:  negative except as noted in HPI  EYES:  negative except as noted in HPI  RESPIRATORY:  negative except as noted in HPI  CARDIOVASCULAR:  negative except as noted in HPI  GASTROINTESTINAL:  negative except as noted in HPI  GENITOURINARY:  negative except as noted in HPI  MUSCULOSKELETAL:  negative except as noted in HPI  SKIN:  negative except as noted in HPI  ENDOCRINE/METABOLIC: negative except as noted in HPI  HEMATOLOGIC/LYMPHATIC:  negative except as noted in HPI  ALLERGY/IMMUN: negative except as noted in HPI  NEUROLOGICAL:  negative except as noted in HPI  BEHAVIOR/PSYCH:  negative except as noted in HPI    OBJECTIVE      Physical  VITALS:  BP 93/76   Pulse 99   Temp 98.8 °F (37.1 °C) (Oral)   Resp 16   Ht 5' 2\" (1.575 m)   Wt 210 lb (95.3 kg)   LMP  (LMP Unknown)   SpO2 99%   BMI 38.41 kg/m²     This is a 81 y.o. female. Patient is alert and cooperative. Patient appears well developed, well nourished. Mood is happy with normal affect. Not obviously hearing impaired. Patient is able to say a few words at a time with placing her PMV. She removes the PMV between speaking. Her breathing is unlabored wtihout evidence of respiratory distress or stridor. Small amount of old, clotted blood on the split gauze. No bright red blood noted on the gauze and none in the inner cannula. Left cheek has mild erythema and is warm to touch, but no significant induration or fluctuance is noted. Patient continues to report mild tenderness around tooth #14 and #15 but no obvious signs of infection. No palpable cervical lymphadenopathy.     Data  CBC with Differential:    Lab Results   Component Value Date/Time    WBC 6.1 01/27/2023 05:45 AM    RBC 2.69 01/27/2023 05:45 AM    HGB 9.4 01/27/2023 01:45 PM    HCT 29.5 01/27/2023 01:45 PM     01/27/2023 05:45 AM    .5 01/27/2023 05:45 AM    MCH 32.7 01/27/2023 05:45 AM    MCHC 32.2 01/27/2023 05:45 AM    NRBC 0 01/27/2023 05:45 AM    SEGSPCT 65.0 01/27/2023 05:45 AM    MONOPCT 11.3 01/27/2023 05:45 AM    MONOSABS 0.7 01/27/2023 05:45 AM    LYMPHSABS 1.2 01/27/2023 05:45 AM    EOSABS 0.1 01/27/2023 05:45 AM BASOSABS 0.0 01/27/2023 05:45 AM     BMP:    Lab Results   Component Value Date/Time     01/27/2023 05:45 AM    K 4.4 01/27/2023 05:45 AM     01/27/2023 05:45 AM    CO2 24 01/27/2023 05:45 AM    BUN 6 01/27/2023 05:45 AM    LABALBU 4.3 01/21/2023 04:15 AM    CREATININE 0.7 01/27/2023 05:45 AM    CALCIUM 8.3 01/27/2023 05:45 AM    LABGLOM >60 01/27/2023 05:45 AM    GLUCOSE 221 01/27/2023 05:45 AM     CT facial bones wo contrast 1/26/23-   FINDINGS: There is no facial bone fracture. Visualized paranasal sinuses and mastoid air cells are clear. Metallic artifact from multiple dental fillings limits evaluation. There is at least one missing tooth in the left mandible. No large abnormal    lucencies are identified in the maxilla or mandible. Orbits and extraocular structures are intact. Soft tissues are unremarkable. Impression       No evidence of dental abscess. FINDINGS: There is no facial bone fracture. Visualized paranasal sinuses and mastoid air cells are clear. Metallic artifact from multiple dental fillings limits evaluation. There is at least one missing tooth in the left mandible. No large abnormal    lucencies are identified in the maxilla or mandible. Orbits and extraocular structures are intact. Soft tissues are unremarkable. Impression       No evidence of dental abscess.        Inpatient Medications  Current Facility-Administered Medications: insulin lispro (HUMALOG) injection vial 0-16 Units, 0-16 Units, SubCUTAneous, TID WC  insulin lispro (HUMALOG) injection vial 0-4 Units, 0-4 Units, SubCUTAneous, Nightly  lactulose (CHRONULAC) 10 GM/15ML solution 20 g, 20 g, Oral, TID  sodium phosphate (FLEET) pediatric enema, , Rectal, Once PRN  lidocaine 4 % external patch 1 patch, 1 patch, TransDERmal, Daily  lisinopril (PRINIVIL;ZESTRIL) tablet 5 mg, 5 mg, Oral, Daily  ampicillin-sulbactam (UNASYN) 3,000 mg in sodium chloride 0.9 % 100 mL IVPB (mini-bag), 3,000 mg, IntraVENous, 4 times per day  lidocaine (LMX) 4 % cream, , Topical, PRN  HYDROcodone-acetaminophen (NORCO) 5-325 MG per tablet 1 tablet, 1 tablet, Oral, Q6H PRN  docusate sodium (COLACE) capsule 100 mg, 100 mg, Oral, BID  senna (SENOKOT) tablet 8.6 mg, 1 tablet, Oral, Nightly  polyethylene glycol (GLYCOLAX) packet 17 g, 17 g, Oral, Daily  lidocaine PF 1 % injection 5 mL, 5 mL, IntraDERmal, Once  sodium chloride flush 0.9 % injection 5-40 mL, 5-40 mL, IntraVENous, 2 times per day  sodium chloride flush 0.9 % injection 5-40 mL, 5-40 mL, IntraVENous, PRN  0.9 % sodium chloride infusion, 25 mL, IntraVENous, PRN  albuterol (PROVENTIL) nebulizer solution 2.5 mg, 2.5 mg, Nebulization, Q4H PRN  metoprolol succinate (TOPROL XL) extended release tablet 25 mg, 25 mg, Oral, Daily  rivaroxaban (XARELTO) tablet 15 mg, 15 mg, Oral, BID WC  ondansetron (ZOFRAN-ODT) disintegrating tablet 4 mg, 4 mg, Oral, Q8H PRN **OR** ondansetron (ZOFRAN) injection 4 mg, 4 mg, IntraVENous, Q6H PRN  polyethylene glycol (GLYCOLAX) packet 17 g, 17 g, Oral, Daily PRN  acetaminophen (TYLENOL) tablet 650 mg, 650 mg, Oral, Q6H PRN **OR** acetaminophen (TYLENOL) suppository 650 mg, 650 mg, Rectal, Q6H PRN  albuterol sulfate HFA (PROVENTIL;VENTOLIN;PROAIR) 108 (90 Base) MCG/ACT inhaler 2 puff, 2 puff, Inhalation, Q6H PRN  aspirin chewable tablet 81 mg, 81 mg, Oral, Daily  furosemide (LASIX) tablet 40 mg, 40 mg, Oral, Daily  nitroGLYCERIN (NITROSTAT) SL tablet 0.4 mg, 0.4 mg, SubLINGual, Q5 Min PRN  rosuvastatin (CRESTOR) tablet 10 mg, 10 mg, Oral, Daily  simethicone (MYLICON) chewable tablet 80 mg, 80 mg, Oral, 4x Daily  glucose chewable tablet 16 g, 4 tablet, Oral, PRN  dextrose bolus 10% 125 mL, 125 mL, IntraVENous, PRN **OR** dextrose bolus 10% 250 mL, 250 mL, IntraVENous, PRN  glucagon (rDNA) injection 1 mg, 1 mg, SubCUTAneous, PRN  dextrose 10 % infusion, , IntraVENous, Continuous PRN  insulin glargine (LANTUS) injection vial 22 Units, 22 Units, SubCUTAneous, Nightly    ASSESSMENT AND PLAN    Hemoptysis, left facial pain    -No obvious recurrence of hemoptysis reported by patient and no obvious bleeding on exam. Continue to monitor for now  -There is erythema and warmth over left cheek at this time. Continue Unasyn for now, but may need to modify antibiotic coverage if symptoms continue to worsen despite treatment  -Trend CBC  -I discussed with the patient that her procedure with Dr Tamika Rodriguez will likely be cancelled to allow time to recover from the DVT/PE.  Will discuss follow up and the plan going forward depending how she does over the next few days  -ENT will continue to follow      Electronically signed by Jorja Sandifer, PA on 1/27/2023 at 3:26 PM

## 2023-01-27 NOTE — PROGRESS NOTES
Pt was asleep but her  was there.  She was blessed   01/27/23 1700   Encounter Summary   Encounter Overview/Reason  Initial Encounter   Service Provided For: Patient and family together   Referral/Consult From: [facility]   Support System Spouse   Last Encounter  01/27/23   Complexity of Encounter Low   Begin Time 1630   End Time  1635   Total Time Calculated 5 min   Spiritual/Emotional needs   Type Spiritual Support

## 2023-01-27 NOTE — PROGRESS NOTES
Respiratory Care Artificial Airway Evaluation    The patient's airway is older than seven days. The patient's airway does not have panic sutures. Patient experiencing no problems. Type of airway:  [x] Tracheostomy  [] Larngectomy    Size 6    Type  []Shiley adult flexible tracheostomy tube with taperguard cuff   []Shiley disposable cannula cuffed tracheostomy tube (DCT)   []Shiley diposable cannula fenestrated cuffed tracheostomy tube (DFEN)  []Shiley disposable cannula cuffed percutaneous tracheostomy tube (PERC)  []Shiley disposable cannula cuffless tracheostomy tube (DCFS)  [] Shiley disposable cannula cuffless fenestrated tracheostomy tube [facility])  []Shiley extended length cuffless tracheostomy tube  []Shiley extended length cuffed tracheostomy tube  []Shiley laryngectomy tube (LGT)  []Shiley single cannula cuffed tracheostomy tube (SCT)  []Portex Bivona silicone tracheostomy tube  [x]Other:    Length  [x]standard  []99 mm  []120 mm  []Other:    Reference number for Shiley tracheostomy tube with taperguard cuff. Needed for homecare to order. Located on [facility].    [] [address] [facility]    [] [facility]  [] [address] [facility]  [] [id number]  [] [id number]  [] [id number]  [x] [id number]  [] [id number]    Inner cannula size for Shiley tracheostomy tube with taperguard cuff  [] 4IC65  [] 6IC75  [] [id number]

## 2023-01-28 LAB
ANION GAP SERPL CALC-SCNC: 11 MEQ/L (ref 8–16)
ANISOCYTOSIS BLD QL SMEAR: PRESENT
BASOPHILS ABSOLUTE: 0 THOU/MM3 (ref 0–0.1)
BASOPHILS NFR BLD AUTO: 0.6 %
BUN SERPL-MCNC: 7 MG/DL (ref 7–22)
CA-I BLD ISE-SCNC: 1.07 MMOL/L (ref 1.12–1.32)
CALCIUM SERPL-MCNC: 7.8 MG/DL (ref 8.5–10.5)
CHLORIDE SERPL-SCNC: 104 MEQ/L (ref 98–111)
CO2 SERPL-SCNC: 25 MEQ/L (ref 23–33)
CREAT SERPL-MCNC: 0.7 MG/DL (ref 0.4–1.2)
DEPRECATED RDW RBC AUTO: 57.5 FL (ref 35–45)
EOSINOPHIL NFR BLD AUTO: 1 %
EOSINOPHILS ABSOLUTE: 0.1 THOU/MM3 (ref 0–0.4)
ERYTHROCYTE [DISTWIDTH] IN BLOOD BY AUTOMATED COUNT: 15.9 % (ref 11.5–14.5)
GFR SERPL CREATININE-BSD FRML MDRD: > 60 ML/MIN/1.73M2
GLUCOSE BLD STRIP.AUTO-MCNC: 118 MG/DL (ref 70–108)
GLUCOSE BLD STRIP.AUTO-MCNC: 134 MG/DL (ref 70–108)
GLUCOSE SERPL-MCNC: 135 MG/DL (ref 70–108)
HCT VFR BLD AUTO: 26 % (ref 37–47)
HCT VFR BLD AUTO: 27.5 % (ref 37–47)
HGB BLD-MCNC: 8.2 GM/DL (ref 12–16)
HGB BLD-MCNC: 8.7 GM/DL (ref 12–16)
IMM GRANULOCYTES # BLD AUTO: 0.16 THOU/MM3 (ref 0–0.07)
IMM GRANULOCYTES NFR BLD AUTO: 3.2 %
LYMPHOCYTES ABSOLUTE: 1.3 THOU/MM3 (ref 1–4.8)
LYMPHOCYTES NFR BLD AUTO: 25.1 %
MCH RBC QN AUTO: 31.8 PG (ref 26–33)
MCHC RBC AUTO-ENTMCNC: 31.5 GM/DL (ref 32.2–35.5)
MCV RBC AUTO: 100.8 FL (ref 81–99)
MONOCYTES ABSOLUTE: 0.7 THOU/MM3 (ref 0.4–1.3)
MONOCYTES NFR BLD AUTO: 13.3 %
NEUTROPHILS NFR BLD AUTO: 56.8 %
NRBC BLD AUTO-RTO: 0 /100 WBC
PLATELET # BLD AUTO: 191 THOU/MM3 (ref 130–400)
PLATELET BLD QL SMEAR: ADEQUATE
PMV BLD AUTO: 9.5 FL (ref 9.4–12.4)
POLYCHROMASIA BLD QL SMEAR: ABNORMAL
POTASSIUM SERPL-SCNC: 4.1 MEQ/L (ref 3.5–5.2)
RBC # BLD AUTO: 2.58 MILL/MM3 (ref 4.2–5.4)
SCAN OF BLOOD SMEAR: NORMAL
SEGMENTED NEUTROPHILS ABSOLUTE COUNT: 2.8 THOU/MM3 (ref 1.8–7.7)
SODIUM SERPL-SCNC: 140 MEQ/L (ref 135–145)
WBC # BLD AUTO: 5 THOU/MM3 (ref 4.8–10.8)

## 2023-01-28 PROCEDURE — 6360000002 HC RX W HCPCS: Performed by: STUDENT IN AN ORGANIZED HEALTH CARE EDUCATION/TRAINING PROGRAM

## 2023-01-28 PROCEDURE — 85018 HEMOGLOBIN: CPT

## 2023-01-28 PROCEDURE — 6370000000 HC RX 637 (ALT 250 FOR IP): Performed by: STUDENT IN AN ORGANIZED HEALTH CARE EDUCATION/TRAINING PROGRAM

## 2023-01-28 PROCEDURE — 82330 ASSAY OF CALCIUM: CPT

## 2023-01-28 PROCEDURE — 6370000000 HC RX 637 (ALT 250 FOR IP)

## 2023-01-28 PROCEDURE — 6370000000 HC RX 637 (ALT 250 FOR IP): Performed by: PHYSICIAN ASSISTANT

## 2023-01-28 PROCEDURE — 99231 SBSQ HOSP IP/OBS SF/LOW 25: CPT | Performed by: PHYSICIAN ASSISTANT

## 2023-01-28 PROCEDURE — 1200000000 HC SEMI PRIVATE

## 2023-01-28 PROCEDURE — 2700000000 HC OXYGEN THERAPY PER DAY

## 2023-01-28 PROCEDURE — 82948 REAGENT STRIP/BLOOD GLUCOSE: CPT

## 2023-01-28 PROCEDURE — 2580000003 HC RX 258

## 2023-01-28 PROCEDURE — 94760 N-INVAS EAR/PLS OXIMETRY 1: CPT

## 2023-01-28 PROCEDURE — 6360000002 HC RX W HCPCS

## 2023-01-28 PROCEDURE — 99232 SBSQ HOSP IP/OBS MODERATE 35: CPT

## 2023-01-28 PROCEDURE — 80048 BASIC METABOLIC PNL TOTAL CA: CPT

## 2023-01-28 PROCEDURE — 36415 COLL VENOUS BLD VENIPUNCTURE: CPT

## 2023-01-28 PROCEDURE — 85025 COMPLETE CBC W/AUTO DIFF WBC: CPT

## 2023-01-28 PROCEDURE — 85014 HEMATOCRIT: CPT

## 2023-01-28 PROCEDURE — 6370000000 HC RX 637 (ALT 250 FOR IP): Performed by: FAMILY MEDICINE

## 2023-01-28 RX ORDER — LACTULOSE 10 G/15ML
20 SOLUTION ORAL 3 TIMES DAILY PRN
Status: DISCONTINUED | OUTPATIENT
Start: 2023-01-28 | End: 2023-01-29 | Stop reason: HOSPADM

## 2023-01-28 RX ORDER — CALCIUM GLUCONATE 20 MG/ML
2000 INJECTION, SOLUTION INTRAVENOUS ONCE
Status: COMPLETED | OUTPATIENT
Start: 2023-01-28 | End: 2023-01-28

## 2023-01-28 RX ADMIN — SODIUM CHLORIDE 3000 MG: 900 INJECTION INTRAVENOUS at 18:13

## 2023-01-28 RX ADMIN — ONDANSETRON 4 MG: 2 INJECTION INTRAMUSCULAR; INTRAVENOUS at 21:20

## 2023-01-28 RX ADMIN — SIMETHICONE 80 MG: 80 TABLET, CHEWABLE ORAL at 21:20

## 2023-01-28 RX ADMIN — SODIUM CHLORIDE 3000 MG: 900 INJECTION INTRAVENOUS at 00:50

## 2023-01-28 RX ADMIN — SODIUM CHLORIDE, PRESERVATIVE FREE 10 ML: 5 INJECTION INTRAVENOUS at 09:17

## 2023-01-28 RX ADMIN — RIVAROXABAN 15 MG: 15 TABLET, FILM COATED ORAL at 09:18

## 2023-01-28 RX ADMIN — SENNOSIDES 8.6 MG: 8.6 TABLET, FILM COATED ORAL at 21:20

## 2023-01-28 RX ADMIN — HYDROCODONE BITARTRATE AND ACETAMINOPHEN 1 TABLET: 5; 325 TABLET ORAL at 02:11

## 2023-01-28 RX ADMIN — ROSUVASTATIN 10 MG: 10 TABLET, FILM COATED ORAL at 09:20

## 2023-01-28 RX ADMIN — SODIUM CHLORIDE 3000 MG: 900 INJECTION INTRAVENOUS at 05:46

## 2023-01-28 RX ADMIN — INSULIN GLARGINE 22 UNITS: 100 INJECTION, SOLUTION SUBCUTANEOUS at 21:27

## 2023-01-28 RX ADMIN — ONDANSETRON 4 MG: 2 INJECTION INTRAMUSCULAR; INTRAVENOUS at 13:41

## 2023-01-28 RX ADMIN — SODIUM CHLORIDE, PRESERVATIVE FREE 10 ML: 5 INJECTION INTRAVENOUS at 21:24

## 2023-01-28 RX ADMIN — CALCIUM GLUCONATE 2000 MG: 20 INJECTION, SOLUTION INTRAVENOUS at 16:45

## 2023-01-28 RX ADMIN — ASPIRIN 81 MG 81 MG: 81 TABLET ORAL at 09:18

## 2023-01-28 RX ADMIN — DOCUSATE SODIUM 200 MG: 100 CAPSULE, LIQUID FILLED ORAL at 21:20

## 2023-01-28 RX ADMIN — RIVAROXABAN 15 MG: 15 TABLET, FILM COATED ORAL at 18:14

## 2023-01-28 RX ADMIN — SODIUM CHLORIDE 3000 MG: 900 INJECTION INTRAVENOUS at 13:42

## 2023-01-28 RX ADMIN — HYDROCODONE BITARTRATE AND ACETAMINOPHEN 1 TABLET: 5; 325 TABLET ORAL at 16:45

## 2023-01-28 RX ADMIN — HYDROCODONE BITARTRATE AND ACETAMINOPHEN 1 TABLET: 5; 325 TABLET ORAL at 09:09

## 2023-01-28 RX ADMIN — SIMETHICONE 80 MG: 80 TABLET, CHEWABLE ORAL at 09:18

## 2023-01-28 ASSESSMENT — PAIN DESCRIPTION - LOCATION
LOCATION: FACE
LOCATION: JAW
LOCATION: FACE

## 2023-01-28 ASSESSMENT — PAIN - FUNCTIONAL ASSESSMENT: PAIN_FUNCTIONAL_ASSESSMENT: ACTIVITIES ARE NOT PREVENTED

## 2023-01-28 ASSESSMENT — PAIN DESCRIPTION - DESCRIPTORS
DESCRIPTORS: ACHING;PRESSURE;SORE;THROBBING
DESCRIPTORS: THROBBING;ACHING;DISCOMFORT
DESCRIPTORS: ACHING

## 2023-01-28 ASSESSMENT — PAIN DESCRIPTION - ORIENTATION
ORIENTATION: LEFT

## 2023-01-28 ASSESSMENT — PAIN SCALES - GENERAL
PAINLEVEL_OUTOF10: 7
PAINLEVEL_OUTOF10: 10
PAINLEVEL_OUTOF10: 7
PAINLEVEL_OUTOF10: 4

## 2023-01-28 ASSESSMENT — PAIN DESCRIPTION - PAIN TYPE: TYPE: ACUTE PAIN

## 2023-01-28 ASSESSMENT — PAIN SCALES - WONG BAKER: WONGBAKER_NUMERICALRESPONSE: 4

## 2023-01-28 ASSESSMENT — PAIN DESCRIPTION - ONSET: ONSET: ON-GOING

## 2023-01-28 ASSESSMENT — PAIN DESCRIPTION - FREQUENCY: FREQUENCY: CONTINUOUS

## 2023-01-28 NOTE — PLAN OF CARE
Problem: Pain  Goal: Verbalizes/displays adequate comfort level or baseline comfort level  1/28/2023 0253 by Lito Arriaza RN  Flowsheets (Taken 1/28/2023 8091)  Verbalizes/displays adequate comfort level or baseline comfort level:   Encourage patient to monitor pain and request assistance   Assess pain using appropriate pain scale   Administer analgesics based on type and severity of pain and evaluate response   Implement non-pharmacological measures as appropriate and evaluate response  Outcome: Not Progressing  Note: Pain continues on  left facial    Problem: Chronic Conditions and Co-morbidities  Goal: Patient's chronic conditions and co-morbidity symptoms are monitored and maintained or improved  1/28/2023 0253 by Lito Arriaza RN  Flowsheets (Taken 1/27/2023 1945)  Care Plan - Patient's Chronic Conditions and Co-Morbidity Symptoms are Monitored and Maintained or Improved:   Monitor and assess patient's chronic conditions and comorbid symptoms for stability, deterioration, or improvement   Collaborate with multidisciplinary team to address chronic and comorbid conditions and prevent exacerbation or deterioration       Problem: Metabolic/Fluid and Electrolytes - Adult  Goal: Glucose maintained within prescribed range  1/28/2023 0253 by Lito Arriaza RN  Flowsheets  Taken 1/28/2023 0253  Glucose maintained within prescribed range:   Monitor blood glucose as ordered   Assess for signs and symptoms of hyperglycemia and hypoglycemia   Administer ordered medications to maintain glucose within target range   Assess barriers to adequate nutritional intake and initiate nutrition consult as needed  Taken 1/27/2023 1945  Glucose maintained within prescribed range: Assess for signs and symptoms of hyperglycemia and hypoglycemia.   Outcome: Progressing

## 2023-01-28 NOTE — PLAN OF CARE
Problem: Pain  Goal: Verbalizes/displays adequate comfort level or baseline comfort level  Outcome: Not Progressing  Note: Continued pain left facial 7/10     Problem: Metabolic/Fluid and Electrolytes - Adult  Goal: Glucose maintained within prescribed range  Outcome: Not Progressing  Flowsheets (Taken 1/28/2023 0930)  Glucose maintained within prescribed range:   Monitor blood glucose as ordered   Assess for signs and symptoms of hyperglycemia and hypoglycemia   Administer ordered medications to maintain glucose within target range   Assess barriers to adequate nutritional intake and initiate nutrition consult as needed   Instruct patient on self management of diabetes and initiate consult as needed     Problem: Discharge Planning  Goal: Discharge to home or other facility with appropriate resources  Outcome: Progressing  Flowsheets (Taken 1/28/2023 0930)  Discharge to home or other facility with appropriate resources:   Identify barriers to discharge with patient and caregiver   Arrange for needed discharge resources and transportation as appropriate   Identify discharge learning needs (meds, wound care, etc)   Arrange for interpreters to assist at discharge as needed   Refer to discharge planning if patient needs post-hospital services based on physician order or complex needs related to functional status, cognitive ability or social support system     Problem: Skin/Tissue Integrity  Goal: Absence of new skin breakdown  Description: 1. Monitor for areas of redness and/or skin breakdown  2. Assess vascular access sites hourly  3. Every 4-6 hours minimum:  Change oxygen saturation probe site  4. Every 4-6 hours:  If on nasal continuous positive airway pressure, respiratory therapy assess nares and determine need for appliance change or resting period.   Outcome: Progressing     Problem: Safety - Adult  Goal: Free from fall injury  Outcome: Progressing     Problem: Chronic Conditions and Co-morbidities  Goal: Patient's chronic conditions and co-morbidity symptoms are monitored and maintained or improved  Outcome: Progressing  Flowsheets (Taken 1/28/2023 0930)  Care Plan - Patient's Chronic Conditions and Co-Morbidity Symptoms are Monitored and Maintained or Improved:   Monitor and assess patient's chronic conditions and comorbid symptoms for stability, deterioration, or improvement   Collaborate with multidisciplinary team to address chronic and comorbid conditions and prevent exacerbation or deterioration   Update acute care plan with appropriate goals if chronic or comorbid symptoms are exacerbated and prevent overall improvement and discharge     Problem: Pain  Goal: Verbalizes/displays adequate comfort level or baseline comfort level  Outcome: Not Progressing  Note: Continued pain left facial 7/10     Problem: Metabolic/Fluid and Electrolytes - Adult  Goal: Glucose maintained within prescribed range  Outcome: Not Progressing  Flowsheets (Taken 1/28/2023 0930)  Glucose maintained within prescribed range:   Monitor blood glucose as ordered   Assess for signs and symptoms of hyperglycemia and hypoglycemia   Administer ordered medications to maintain glucose within target range   Assess barriers to adequate nutritional intake and initiate nutrition consult as needed   Instruct patient on self management of diabetes and initiate consult as needed

## 2023-01-28 NOTE — PROGRESS NOTES
Hospitalist Progress Note    Patient:  Ricky Liang      Unit/Bed:8B-34/034-A    YOB: 1950    MRN: [id number]       Acct: [de-identified]     PCP: Nathaniel Og DO    Date of Admission: 1/21/2023    Assessment/Plan:    Acute PE/DVT:   CTA chest: Filling defects in the right lower lobe pulmonary arterial branches consistent with acute pulmonary emboli. No evidence of right ventricular strain. US Duplex + for DVT Areas of abnormal echogenicity in the right posterior tibial vein, left greater saphenous, left peroneal and left anterior tibial veins suspicious for thrombi. Likely due to reduced mobility, walks few feet at home but since COVID, less active. S/p IV Heparin drip transitioned to DOAC  Echo showed no acute issues (see # 11). Left facial pain and swelling, improving -> ?possible dental abscess? 1/26: Patient reports that she has having 10/10 left-sided facial pain her mouth. Unable to tell me if from top or bottom teeth. Reports that she has been having intermittent pains in her mouth for the past 3 to 4 days but did not think anything seriously of it. States she feels like left-sided her face is more swollen than the right. Was slightly warm to touch. Continue  empiric IV Unasyn  CT facial bones negative for dental abscess however does note multiple metallic dental fillings. ENT consulted for further evaluation  Hemoptysis  Patient reports intermittent blood tinged mucous coming from trach. States that it's clear green at times and then intermittently has some blood tinge to it. Patient also reports she has not had humidified oxygen through her trach mask d/t malfunction of humidifier. Discussed with ENT who does not believe patient needs pulmonology consult at this time. Believes bleeding may be from the mucosa being dried and trach care. Okay to resume [facility], but monitor for recurrence/worsened hemoptysis.   Case was discussed with pulmonology, chart reviewed, case discussed. Pulmonology also tinged mucous from trach care, limited humidified O2 on admission. No further recs at this time. Consider CT chest and pulmonology consult if continues  Trend and monitor H&H q6H. Bilateral LE edema:   Likely due to DVTs. PT/OT. Constipation    KUB is notable for mild gaseous distention of the stomach and scattered gas in the colon, moderate amount of fecal material in the colon, no abnormally dilated bowel loops. -- ?gastroparesis  Bowel regimen: Colace, senna, MiraLAX. Add lactulose  PRN enema  Hypotension (POA), resolved  BP low in ER 79/57 in ER and HR up 128 -- most likely due to hypovolemia with decreased po, lasix use and with BP meds --> improved with IVF. Will hold further IVF with BLE edema issues -- cont home lisinopril, toprol with parameters and monitor closely -- consider further IVF if recurs-- ?due to adrenal insufficiency with recent tapering of steroids over last couple months  -- may need stress dose steroids if BP worsens again. -- less likely due to PE as no RV strain on CT - Echo okay. Sinus tachycardia, improving  Chronic component also per pt -  in ER -- improved with IVF - likely due to combo of hypovolemia and PE  Continue Toprol > increase to 50 mg   Monitor tele  Leukocytosis, resolved  12.6 initially in ER with repeat 4 hrs later WNL-- afeb - monitor with above - Suspect likely reactive to dehydration. Acute on chronic macrocytic anemia   ? ?dry on initial presentation as hgb 12.1 and s/p IVF down to 9.9 -- prior in 8/2022 was 13 but in 6/2022 was 9-10's  Transfuse for hemoglobin less than 7 g/dL or hemodynamic instability. Iron studies ordered  Essential HTN:   BP controlled today. Continue Troprol. Hold Lisinopril given relative hypotension. Hold lasix. BP medications with parameters. CAD:   s/p PCI in the history. Lipitor, BB, & ASA. IDDMII:   HbA1c 5.5 (1/24/23).    On Lantus 44u nightly with Humalog at home, however this was for when she was on steroids. Decrease lantus to 22 units nightly which is 1/2 home dose and monitor BS   Continue medium dose SSI  ?need decrease as pt was on long term steroids and have been weaned off - cont monitor closely Monitor BG with inpatient goal 140-180. Accu-Cheks  Hypoglycemia protocol  Diabetic diet  Chronic respiratory failure with tracheostomy, tracheal stenosis -- at baseline -- monitor with #1   Following with ENT Dr. Mor Jin and plan for surgery 2/9/23 but may need to be delayed. Cardiac clearance appointment rescheduled for 2/2/2023. Hx COVID s/p tracheostomy: 12/2021-04/2022  Elevated troponin   Initially 0.021 -> 0.019 in ER -- likely due to PE -- no acute EKG changes  Echo 1/23/23: Notable for EF 60-65%, no WMA, G1DD, RVSP 40 mmHg - last echo 3/2022 with EF 60%, no WMA - stress 6/2022 non-diagnostic for ischemia, normal EF.  CAD:   with history of stent -- ASA, BB, statin - trop min elevated on admission - trend - EKG no acute changes   Hyperlipidemia:   Statin  Obesity:   BMI 38.55 kg/m². Discussed and educated on lifestyle modifications. Expected discharge date: Pending clinical course    Disposition:    [x] Home       [] TCU       [] Rehab       [] Psych       [] SNF       [] [place]       [] Other-    Chief Complaint: Abdominal bloating    Hospital Course: Per HPI documented 1/21/2023: \"79 yo F with history of DM, HTN, COVID 12/2020 s/p tracheostomy 04/2022 follows with Dr. Mor Jin, presented to [facility] ED for complaints of abdominal bloating with nausea/vomiting. Ed: afebrile, hemodynamically stable tachycardic, saturating well on 0.5 L trach mask. Labs notable for Trop 0.021 then 0.019 on repeat, leukocytosis 12.6. Ddimer elevation ~5000. CTA chest: Filling defects in the right lower lobe pulmonary arterial branches consistent with acute pulmonary emboli. No evidence of right ventricular strain. CXR WNL. KUB with no acute process.  US Duplex + for distal DVT Areas of abnormal echogenicity in the right posterior tibial vein, left greater saphenous, left peroneal and left anterior tibial veins suspicious for thrombi. Started on heparin gtt. Given reglan ang GI cocktal. Had a BM. Admitted for further management. On eval, per patient and her  present at bedside, reports has been having issues with constipation and bloating last couple of days which got worse. Woke up with worsened abdominal bloating/distention with nausea and vomiting. Had a large BM overnight, feels slightly better since then. No subjective fever, chills, dizziness, diarrhea. Dyspnea was in the setting of weaning down her oxygen needs following a weaning protcol. No chest pain, palpitations, syncope. Follows with Dr. Mortimer Maris. Plan for weaning off trach with a procedure on 02/09/2023. \"    1/25/23: Resumed care of patient today. She is sitting up in bedside recliner, nontoxic in appearance, no apparent distress. Remains afebrile, satting 100% on room air, hemodynamically stable. Patient stating that she is still having an intermittent cough. States that at times it is blood-tinged but it is more clear today. She did remove her inner cannula and it was noted to have dried blood on it. Patient states she has not cleaned her cannula since yesterday. We will clean cannula, monitor overnight. Consider switching back to heparin. May need to consult Dr. Mortimer Maris and ENT for further recommendations. Patient also stating she has not had a bowel movement yet in 5 days. States she is passing lots of gas but feels nauseous and mildly bloated today. Will obtain repeat KUB and start her on bowel regimen. Keep patient overnight for closer monitoring of minimal blood in trach tube. Additionally patient has a PICC line has been in place since August of last year.   Through the notes it appears that patient is very hard stick gets weekly lab draws and per note from Dr. Mortimer Maris in 8/2022 wanted patient to have her PICC line given that she would need recurrent procedures. X-ray showing the PICC line is not an appropriate place, will need to be removed and replaced for lab draws. We will keep her on IV fluids at [facility] rate to keep PICC line open while in hospital.    1/26/23: Afebrile, hemodynamically stable. Patient reports that she has having 10/10 left-sided facial pain her mouth. Unable to tell me if from top or bottom teeth. Reports that she has been having intermittent pains in her mouth for the past 3 to 4 days but did not think anything seriously of it. States she feels like left-sided her face is more swollen than the right. Was slightly warm to touch. Will start Unasyn and get CT of the face. ENT consulted. Patient reports intermittent blood tinged mucous coming from trach. States that it's clear green at times and then intermittently has some blood tinge to it. Patient also reports she has not had humidified oxygen through her trach mask d/t malfunction of humidifier. Discussed with ENT who does not believe patient needs pulmonology consult at this time. Believes bleeding may be from the mucosa being dried and trach care. Okay to resume [address] [facility], but monitor for recurrence/worsened hemoptysis. 1/27/23: Afebrile, hemodynamically stable. No acute events overnight. Patient stating she feels better this evening. Currently rates her pain as a 7 out of 10. States that Delford Loges does seem to help. Reports that her face is not swollen. On exam her face does not appear as erythematous or warm. We will continue IV antibiotics. Patient stating her sputum is more clear today. Does report some intermittent blood-tinged sputum but no overt blood noted from tracheostomy itself or on gauze. H&H stable. Still reports that having a bowel movement. States she is passing lots of gas. He does have active bowel sounds. We will add lactulose and as needed enema to bowel regimen.     Subjective (past 24 hours):      Denies chest pains, palpitations, dizziness, lightheadedness, shortness of breath at rest, ROSA, abdominal pain, nausea, vomiting, diarrhea, fever, chills. Medications:  Reviewed    Infusion Medications    sodium chloride      dextrose       Scheduled Medications    insulin lispro  0-16 Units SubCUTAneous TID     insulin lispro  0-4 Units SubCUTAneous Nightly    lactulose  20 g Oral TID    [START ON 1/28/2023] docusate sodium  200 mg Oral BID    lidocaine  1 patch TransDERmal Daily    [Held by provider] lisinopril  5 mg Oral Daily    ampicillin-sulbactam  3,000 mg IntraVENous 4 times per day    senna  1 tablet Oral Nightly    polyethylene glycol  17 g Oral Daily    lidocaine 1 % injection  5 mL IntraDERmal Once    sodium chloride flush  5-40 mL IntraVENous 2 times per day    metoprolol succinate  25 mg Oral Daily    rivaroxaban  15 mg Oral BID WC    aspirin  81 mg Oral Daily    furosemide  40 mg Oral Daily    rosuvastatin  10 mg Oral Daily    simethicone  80 mg Oral 4x Daily    insulin glargine  22 Units SubCUTAneous Nightly     PRN Meds: sodium phosphate, HYDROcodone 5 mg - acetaminophen, lidocaine, sodium chloride flush, sodium chloride, albuterol, ondansetron **OR** ondansetron, polyethylene glycol, acetaminophen **OR** acetaminophen, albuterol sulfate HFA, nitroGLYCERIN, glucose, dextrose bolus **OR** dextrose bolus, glucagon (rDNA), dextrose    No intake or output data in the 24 hours ending 01/27/23 3682      Diet:  ADULT DIET; Regular; 4 carb choices (60 gm/meal); Low Sodium (2 gm)    Exam:  BP 94/63   Pulse (!) 103   Temp 99 °F (37.2 °C) (Oral)   Resp 20   Ht 5' 2\" (1.575 m)   Wt 210 lb (95.3 kg)   LMP  (LMP Unknown)   SpO2 98%   BMI 38.41 kg/m²     General appearance: No apparent distress, appears older than stated age and cooperative. Chronically ill in appearance. Nonverbal due to not having Passy-Berne valve, but can communicate through writing and mouthing words.   HEENT: Pupils equal, round, and reactive to light. Conjunctivae/corneas clear. Poor dentition, multiple caps and fillings in teeth. Mild left facial swelling, warmth. No abscesses appreciated on inner mouth and gum line. No cervical lymphadenopathy. Neck: Supple, with full range of motion. No jugular venous distention. Trachea midline. Inner cannula removed showing scant amount of dark crust, no significant pink tinged mucous, bright red blood appreciated. Respiratory:  Normal respiratory effort. Clear to auscultation, bilaterally without Rales/Wheezes/Rhonchi. Cardiovascular: Regular rate and regular rhythm with normal S1/S2 without murmurs, rubs or gallops. Abdomen: Soft, non-tender, non-distended with normal bowel sounds. Musculoskeletal: passive and active ROM x 4 extremities. Skin: Skin color, texture, turgor normal.  No rashes or lesions. Neurologic:  Neurovascularly intact without any focal sensory/motor deficits. Cranial nerves: II-XII intact, grossly non-focal.  Psychiatric: Alert and oriented, thought content appropriate, normal insight  Capillary Refill: Brisk,< 3 seconds   Peripheral Pulses: +2 palpable, equal bilaterally       Labs:   Recent Labs     01/25/23  0600 01/26/23  0405 01/26/23  1000 01/26/23  2242 01/27/23  0545 01/27/23  1345   WBC 4.7* 5.1  --   --  6.1  --    HGB 8.7* 8.4*   < > 8.3* 8.8* 9.4*   HCT 26.1* 26.3*   < > 26.3* 27.3* 29.5*    185  --   --  197  --     < > = values in this interval not displayed. Recent Labs     01/25/23  1820 01/26/23  0405 01/27/23  0545    136 136   K 4.0 4.0 4.4    102 102   CO2 25 24 24   BUN 4* 4* 6*   CREATININE 0.6 0.7 0.7   CALCIUM 8.5 8.4* 8.3*     No results for input(s): AST, ALT, BILIDIR, BILITOT, ALKPHOS in the last 72 hours. No results for input(s): INR in the last 72 hours. No results for input(s): Corky Stallion in the last 72 hours.     Microbiology:      Urinalysis:      Lab Results   Component Value Date/Time    NITRU NEGATIVE 01/21/2023 09:50 AM    WBCUA 10-15 01/21/2023 09:50 AM    BACTERIA NONE SEEN 01/21/2023 09:50 AM    RBCUA 0-2 01/21/2023 09:50 AM    BLOODU NEGATIVE 01/21/2023 09:50 AM    SPECGRAV 1.013 08/12/2022 12:30 PM    GLUCOSEU NEGATIVE 01/21/2023 09:50 AM       Radiology:  XR CHEST (2 VW)    Result Date: 1/21/2023  Portable semierect AP upright and lateral chest views. Comparison: CTA chest 4/13/2022. 3/27/2022 2 view chest. Findings: Cardiac silhouette projects enlarged part of which is due to prominent mediastinal fat on comparison CTA chest. No pulmonary venous congestion. No consolidation, pneumothorax, or defined pleural effusion. Left upper extremity PICC line terminates at the superior vena cava. Proximal tracheostomy tube. No acute bony abnormality. Impression: No acute cardiopulmonary. This document has been electronically signed by: Leif Saldana on 01/21/2023 04:56 AM    XR ABDOMEN (KUB) (SINGLE AP VIEW)    Result Date: 1/21/2023  Supine AP abdomen. Comparison: 1/10/2022 CT abdomen pelvis with contrast. Findings: Mild gaseous intestinal tract. No bowel obstruction. No free air. No apparent organomegaly. Properitoneal flank stripes are visualized. Psoas shadows are partially obscured due to technical factors. Chronic degenerative changes in the spine with levocurvature. Impression: No acute process. This document has been electronically signed by: Leif Saldana on 01/21/2023 04:58 AM    CTA CHEST W WO CONTRAST    Result Date: 1/21/2023  PROCEDURE: CTA CHEST W WO CONTRAST CLINICAL INFORMATION: PE study Elevated D-dimer  tachycardia. COMPARISON: CTA chest dated 6 June 2022. . TECHNIQUE: 3 mm axial images were obtained through the chest after the administration of IV contrast.  A non-contrast localizer was obtained. 3D reconstructions were performed on the scanner to include MIP coronal and sagittal images through the chest. Isovue was the intravenous contrast utilized.  All CT scans at this facility use dose modulation, iterative reconstruction, and/or weight-based dosing when appropriate to reduce radiation dose to as low as reasonably achievable. FINDINGS: There are filling defects in the right lower lobe pulmonary arterial branches consistent with pulmonary emboli. The left pulmonary artery appears be normal. There is mild dilatation of the main pulmonary artery. There is a tracheotomy tube in place. .  There is atherosclerotic calcification in the aortic arch. . There is borderline cardiomegaly. There is no evidence for right ventricular strain. There is increased attenuation in the left anterior descending coronary artery which could represent calcification versus stent placement. There is no pericardial or pleural effusion. There is no mediastinal, axillary or hilar adenopathy. There is thickening off the interstitial lung markings. . There are no infiltrates. There is mild thoracic spondylosis. . There is a sclerotic density in the left eighth rib posteriorly. There is a small hiatal hernia. .      1. Filling defects in the right lower lobe pulmonary arterial branches consistent with acute pulmonary emboli. No evidence of right ventricular strain. 2.. Atherosclerotic calcification in the thoracic aorta. 3. Borderline cardiomegaly. Calcification versus stent placement in the left anterior descending coronary artery. 4. Thickening off the interstitial lung markings. 5. Thoracic spondylosis. 6. Sclerotic density in the left eighth rib posteriorly. 7. Small hiatal hernia. 9. Results called to Murel Pod   at 950 AM **This report has been created using voice recognition software. It may contain minor errors which are inherent in voice recognition technology. ** Final report electronically signed by DR John Logan on 1/21/2023 9:57 AM    VL DUP LOWER EXTREMITY VENOUS BILATERAL    Result Date: 1/21/2023  PROCEDURE: VL DUP LOWER EXTREMITY VENOUS BILATERAL CLINICAL INFORMATION: Bilateral lower extremity edema. COMPARISON: No prior study. TECHNIQUE: Venous doppler ultrasound was performed of the bilateral lower extremities using gray scale, color flow and spectral doppler imaging. FINDINGS: There is normal color flow, spectral analysis and compressibility of the right and left common femoral vein, femoral vein and popliteal veins. There is abnormal echogenicity in the right posterior tibial vein consistent with thrombus. There is abnormal echogenicity in the left greater saphenous, peroneal and possibly left anterior tibial veins, suspicious for thrombus. . There is normal color flow and compressibility in the left posterior tibial veins, right anterior tibial veins and right peroneal veins. 1. Areas of abnormal echogenicity in the right posterior tibial vein, left greater saphenous, left peroneal and left anterior tibial veins suspicious for thrombi. **This report has been created using voice recognition software. It may contain minor errors which are inherent in voice recognition technology. ** Final report electronically signed by DR Tuyet Ortiz on 1/21/2023 11:28 AM      DVT prophylaxis: [] Lovenox                                 [] SCDs                                 [] SQ Heparin                                 [] Encourage ambulation           [x] Already on Anticoagulation     Code Status: Full Code    PT/OT Eval Status: None    Tele:   [x] yes             [] no    NSR with intermittent ST    Active Hospital Problems    Diagnosis Date Noted    PE (pulmonary thromboembolism) ([address] [I26.99] 01/25/2023     Priority: Medium    Pulmonary embolism on right ([address] [I26.99] 01/21/2023     Priority: Medium    Nausea and vomiting [R11.2] 01/21/2023     Priority: Medium    Abdominal bloating [R14.0] 01/21/2023     Priority: Medium    Deep vein thrombosis (DVT) of both lower extremities ([address] [I82.403] 01/21/2023     Priority: Medium    Hypotension [I95.9] 01/21/2023     Priority: Medium    Sinus tachycardia [R00.0] 01/21/2023     Priority: Medium    Leukocytosis [D72.829] 01/21/2023     Priority: Medium    Elevated troponin [R77.8] 01/21/2023     Priority: Medium    Bilateral leg edema [R60.0] 01/21/2023     Priority: Medium    Hyperlipidemia [E78.5] 01/21/2023     Priority: Medium    Tracheal stenosis [J39.8] 06/14/2022     Priority: Medium    Acute on chronic anemia [D64.9]      Priority: Medium    Tracheostomy in place [facility]) [Z93.0] 05/23/2022     Priority: Medium    Essential hypertension [I10] 01/14/2022    Type 2 diabetes mellitus with insulin therapy ([facility] [E11.9, Z79.4] 01/14/2022    History of coronary artery stent placement [Z95.5] 01/14/2022       Electronically signed by WYATT Calderon CNP on 1/27/2023 at 10:45 PM

## 2023-01-28 NOTE — PROGRESS NOTES
Department of Otolaryngology  Progress Note    Chief Complaint:  Left facial pain    Interval history 1/28/23: Patient reports that she is feeling much better today. She reports that her left facial pain is significantly improved. She reports virtually no bleeding from her stoma. No changes in breathing. She denies fevers, chills, shortness of breath. Patient remains afebrile and WBC WNL. No other symptoms or concerns reported at this time    Interval history 1/27/23:  Patient reports that she is doing ok today. She reports persistent left sided facial pain and swelling. CT facial bones was negative for abscess per radiology. CBC remains WNL, 6.1 today. She states that she has been cleaning small amounts of old blood from her stoma, but no bright red blood/pink tinged mucous. She denies shortness of breath, fevers, chills. No other symptoms or concerns. Initial ENT HPI 1/26/23: The patient is a 81 y.o. female, who is known to ENT practice, presented to [facility] ER early in the morning on 1/21/2023. Patient worked up in the ER revealed bilateral lower extremity DVTs is a right lower lobe PE and patient was subsequently admitted. Patient started initially on IV heparin and transition to Xarelto a couple of days ago. There has been some bloody mucus within her tracheostomy inner cannula the last few days and ENT was contacted to provide recommendations. Patient reports that the blood was thicker a few days ago, but within the last day and a half it has been gradually improving. She denies any chest pain, shortness of breath (from baseline), fevers, chills, N/V. Began to report some significant left-sided facial pain that started yesterday evening. She reports that the left side of her face feels swollen as well. Patient was started on Unasyn and CT facial bones ordered to rule out dental infection.   Patient reports that the pain is so severe on the left side of her face that she has difficulty localizing it to upper vs lower jaw vs elsewhere. REVIEW OF SYSTEMS:    A complete multi-organ review of systems was performed and reviewed by me. ENT:  negative except as noted in HPI  CONSTITUTIONAL:  negative except as noted in HPI  EYES:  negative except as noted in HPI  RESPIRATORY:  negative except as noted in HPI  CARDIOVASCULAR:  negative except as noted in HPI  GASTROINTESTINAL:  negative except as noted in HPI  GENITOURINARY:  negative except as noted in HPI  MUSCULOSKELETAL:  negative except as noted in HPI  SKIN:  negative except as noted in HPI  ENDOCRINE/METABOLIC: negative except as noted in HPI  HEMATOLOGIC/LYMPHATIC:  negative except as noted in HPI  ALLERGY/IMMUN: negative except as noted in HPI  NEUROLOGICAL:  negative except as noted in HPI  BEHAVIOR/PSYCH:  negative except as noted in HPI    OBJECTIVE      Physical  VITALS:  BP 92/60   Pulse 94   Temp 98.2 °F (36.8 °C) (Oral)   Resp 18   Ht 5' 2\" (1.575 m)   Wt 210 lb (95.3 kg)   LMP  (LMP Unknown)   SpO2 99%   BMI 38.41 kg/m²     Patient is a 72-year-old female resting comfortably in hospital bed upon my arrival.  She is alert and cooperative with examination. Her mood is happy with normal affect. No obvious hearing impairment. Patient's breathing is unlabored without evidence of respiratory distress. No audible stridor is noted. Tracheostomy in her cannula was removed which revealed no clotted blood or pink-tinged mucus, but she reports he changed a several hours ago. No visible bloody discharge with transillumination through the stoma. The previous warmth and erythema of the left maxilla is resolved, ? Patient maybe laying on that side prior to my assessment yesterday. She continues to report some mild pain in her left jaw (unable to localize upper vs lower), but overall significantly improved. No trismus noted. No submandibular/submental induration.     Data  CBC with Differential:    Lab Results   Component Value Date/Time    WBC 5.0 01/28/2023 05:20 AM    RBC 2.58 01/28/2023 05:20 AM    HGB 8.7 01/28/2023 01:45 PM    HCT 27.5 01/28/2023 01:45 PM     01/28/2023 05:20 AM    .8 01/28/2023 05:20 AM    MCH 31.8 01/28/2023 05:20 AM    MCHC 31.5 01/28/2023 05:20 AM    NRBC 0 01/28/2023 05:20 AM    SEGSPCT 56.8 01/28/2023 05:20 AM    MONOPCT 13.3 01/28/2023 05:20 AM    MONOSABS 0.7 01/28/2023 05:20 AM    LYMPHSABS 1.3 01/28/2023 05:20 AM    EOSABS 0.1 01/28/2023 05:20 AM    BASOSABS 0.0 01/28/2023 05:20 AM     BMP:    Lab Results   Component Value Date/Time     01/28/2023 05:20 AM    K 4.1 01/28/2023 05:20 AM     01/28/2023 05:20 AM    CO2 25 01/28/2023 05:20 AM    BUN 7 01/28/2023 05:20 AM    LABALBU 4.3 01/21/2023 04:15 AM    CREATININE 0.7 01/28/2023 05:20 AM    CALCIUM 7.8 01/28/2023 05:20 AM    LABGLOM >60 01/28/2023 05:20 AM    GLUCOSE 135 01/28/2023 05:20 AM       Inpatient Medications  Current Facility-Administered Medications: lactulose (CHRONULAC) 10 GM/15ML solution 20 g, 20 g, Oral, TID PRN  calcium replacement protocol, , Other, RX Placeholder  calcium gluconate 2000 mg in sodium chloride 100 mL, 2,000 mg, IntraVENous, Once  insulin lispro (HUMALOG) injection vial 0-16 Units, 0-16 Units, SubCUTAneous, TID WC  insulin lispro (HUMALOG) injection vial 0-4 Units, 0-4 Units, SubCUTAneous, Nightly  sodium phosphate (FLEET) pediatric enema, , Rectal, Once PRN  HYDROcodone-acetaminophen (NORCO) 5-325 MG per tablet 1 tablet, 1 tablet, Oral, Q6H PRN  docusate sodium (COLACE) capsule 200 mg, 200 mg, Oral, BID  lidocaine 4 % external patch 1 patch, 1 patch, TransDERmal, Daily  [Held by provider] lisinopril (PRINIVIL;ZESTRIL) tablet 5 mg, 5 mg, Oral, Daily  ampicillin-sulbactam (UNASYN) 3,000 mg in sodium chloride 0.9 % 100 mL IVPB (mini-bag), 3,000 mg, IntraVENous, 4 times per day  lidocaine (LMX) 4 % cream, , Topical, PRN  senna (SENOKOT) tablet 8.6 mg, 1 tablet, Oral, Nightly  polyethylene glycol  (GLYCOLAX) packet 17 g, 17 g, Oral, Daily  lidocaine PF 1 % injection 5 mL, 5 mL, IntraDERmal, Once  sodium chloride flush 0.9 % injection 5-40 mL, 5-40 mL, IntraVENous, 2 times per day  sodium chloride flush 0.9 % injection 5-40 mL, 5-40 mL, IntraVENous, PRN  0.9 % sodium chloride infusion, 25 mL, IntraVENous, PRN  albuterol (PROVENTIL) nebulizer solution 2.5 mg, 2.5 mg, Nebulization, Q4H PRN  metoprolol succinate (TOPROL XL) extended release tablet 25 mg, 25 mg, Oral, Daily  rivaroxaban (XARELTO) tablet 15 mg, 15 mg, Oral, BID WC  ondansetron (ZOFRAN-ODT) disintegrating tablet 4 mg, 4 mg, Oral, Q8H PRN **OR** ondansetron (ZOFRAN) injection 4 mg, 4 mg, IntraVENous, Q6H PRN  polyethylene glycol (GLYCOLAX) packet 17 g, 17 g, Oral, Daily PRN  acetaminophen (TYLENOL) tablet 650 mg, 650 mg, Oral, Q6H PRN **OR** acetaminophen (TYLENOL) suppository 650 mg, 650 mg, Rectal, Q6H PRN  albuterol sulfate HFA (PROVENTIL;VENTOLIN;PROAIR) 108 (90 Base) MCG/ACT inhaler 2 puff, 2 puff, Inhalation, Q6H PRN  aspirin chewable tablet 81 mg, 81 mg, Oral, Daily  [Held by provider] furosemide (LASIX) tablet 40 mg, 40 mg, Oral, Daily  nitroGLYCERIN (NITROSTAT) SL tablet 0.4 mg, 0.4 mg, SubLINGual, Q5 Min PRN  rosuvastatin (CRESTOR) tablet 10 mg, 10 mg, Oral, Daily  simethicone (MYLICON) chewable tablet 80 mg, 80 mg, Oral, 4x Daily  glucose chewable tablet 16 g, 4 tablet, Oral, PRN  dextrose bolus 10% 125 mL, 125 mL, IntraVENous, PRN **OR** dextrose bolus 10% 250 mL, 250 mL, IntraVENous, PRN  glucagon (rDNA) injection 1 mg, 1 mg, SubCUTAneous, PRN  dextrose 10 % infusion, , IntraVENous, Continuous PRN  insulin glargine (LANTUS) injection vial 22 Units, 22 Units, SubCUTAneous, Nightly    ASSESSMENT AND PLAN    Hemoptysis, left facial pain    - No evidence of recurrent/persistent hemoptysis on exam  -Continue humidification on supplemental oxygen  -Previous warmth and erythema of the left maxilla area is resolved. ?Possible due to patient  laying on that side vs infection. Recommend discharge with 10 days of Augmentin at discharge since patient's symptoms have improved on Unasyn. She will need to follow up with her dentist as outpatient  -Patient's upcoming surgery with Dr Woody Box will likely need to be postponed until patient recovers from PE and can come off thinners  -ENT signing off at this time and will follow up with patient outpatient.  Contact ENT with further concerns    Electronically signed by MICHAEL Ortiz on 1/28/2023 at 2:34 PM

## 2023-01-28 NOTE — PROGRESS NOTES
Hospitalist Progress Note    Patient:  Leonardo Patton      Unit/Bed:8B-34/034-A    YOB: 1950    MRN: [id number]       Acct: [de-identified]     PCP: Dk Clark DO    Date of Admission: 1/21/2023    Assessment/Plan:    Acute PE/DVT:   CTA chest: Filling defects in the right lower lobe pulmonary arterial branches consistent with acute pulmonary emboli. No evidence of right ventricular strain. US Duplex + for DVT Areas of abnormal echogenicity in the right posterior tibial vein, left greater saphenous, left peroneal and left anterior tibial veins suspicious for thrombi. Likely due to reduced mobility, walks few feet at home but since COVID, less active. S/p IV Heparin drip transitioned to DOAC  Echo showed no acute issues (see # 11). Left facial pain and swelling, improving -> ?possible dental abscess? 1/26: Patient reports that she has having 10/10 left-sided facial pain her mouth. Unable to tell me if from top or bottom teeth. Reports that she has been having intermittent pains in her mouth for the past 3 to 4 days but did not think anything seriously of it. States she feels like left-sided her face is more swollen than the right. Was slightly warm to touch. Continue  empiric IV Unasyn  CT facial bones negative for dental abscess however does note multiple metallic dental fillings. ENT consulted for further evaluation  Hemoptysis, improving  Patient reports intermittent blood tinged mucous coming from trach. States that it's clear green at times and then intermittently has some blood tinge to it. Patient also reports she has not had humidified oxygen through her trach mask d/t malfunction of humidifier. Discussed with ENT who does not believe patient needs pulmonology consult at this time. Believes bleeding may be from the mucosa being dried and trach care. Okay to resume [facility], but monitor for recurrence/worsened hemoptysis.   Case was discussed with pulmonology, chart reviewed, case discussed. Pulmonology also tinged mucous from trach care, limited humidified O2 on admission. No further recs at this time. Consider CT chest and pulmonology consult if continues  Trend and monitor H&H. Bilateral LE edema:   Likely due to DVTs. PT/OT. Constipation, resolved   KUB is notable for mild gaseous distention of the stomach and scattered gas in the colon, moderate amount of fecal material in the colon, no abnormally dilated bowel loops. -- ?gastroparesis  Bowel regimen: Colace, senna, MiraLAX. Add lactulose  PRN enema  1/28: Reports have 3 very large BM's. Hypotension (POA), resolved  BP low in ER 79/57 in ER and HR up 128 -- most likely due to hypovolemia with decreased po, lasix use and with BP meds --> improved with IVF. Will hold further IVF with BLE edema issues -- cont home lisinopril, toprol with parameters and monitor closely -- consider further IVF if recurs-- ?due to adrenal insufficiency with recent tapering of steroids over last couple months  -- may need stress dose steroids if BP worsens again. -- less likely due to PE as no RV strain on CT - Echo okay. Sinus tachycardia, improving  Chronic component also per pt -  in ER -- improved with IVF - likely due to combo of hypovolemia and PE  Continue Toprol > increase to 50 mg   Monitor tele  Leukocytosis, resolved  12.6 initially in ER with repeat 4 hrs later WNL-- afeb - monitor with above - Suspect likely reactive to dehydration. Acute on chronic macrocytic anemia   ? ?dry on initial presentation as hgb 12.1 and s/p IVF down to 9.9 -- prior in 8/2022 was 13 but in 6/2022 was 9-10's  Transfuse for hemoglobin less than 7 g/dL or hemodynamic instability. Iron studies notable for ferritin 790, iron 46, iron sat 25, folate normal, TIBC 183, vitamin B12 1880  Essential HTN:   BP controlled today. Continue Troprol. Hold Lisinopril given relative hypotension. Hold lasix. BP medications with parameters.    CAD:   s/p PCI in the history. Lipitor, BB, & ASA. IDDMII:   HbA1c 5.5 (1/24/23). On Lantus 44u nightly with Humalog at home, however this was for when she was on steroids. Decrease lantus to 22 units nightly which is 1/2 home dose and monitor BS   Continue medium dose SSI  ?need decrease as pt was on long term steroids and have been weaned off - cont monitor closely Monitor BG with inpatient goal 140-180. Accu-Cheks  Hypoglycemia protocol  Diabetic diet  Chronic respiratory failure with tracheostomy, tracheal stenosis -- at baseline -- monitor with #1   Following with ENT Dr. Lupis Sherman and plan for surgery 2/9/23 but may need to be delayed. Cardiac clearance appointment rescheduled for 2/2/2023. Hx COVID s/p tracheostomy: 12/2021-04/2022  Elevated troponin   Initially 0.021 -> 0.019 in ER -- likely due to PE -- no acute EKG changes  Echo 1/23/23: Notable for EF 60-65%, no WMA, G1DD, RVSP 40 mmHg - last echo 3/2022 with EF 60%, no WMA - stress 6/2022 non-diagnostic for ischemia, normal EF.  CAD:   with history of stent -- ASA, BB, statin - trop min elevated on admission - trend - EKG no acute changes   Hyperlipidemia:   Statin  Obesity:   BMI 38.55 kg/m². Discussed and educated on lifestyle modifications. Expected discharge date: Pending clinical course    Disposition:    [x] Home       [] TCU       [] Rehab       [] Psych       [] SNF       [] [place]       [] Other-    Chief Complaint: Abdominal bloating    Hospital Course: Per HPI documented 1/21/2023: \"79 yo F with history of DM, HTN, COVID 12/2020 s/p tracheostomy 04/2022 follows with Dr. Lupis Sherman, presented to [facility] ED for complaints of abdominal bloating with nausea/vomiting. Ed: afebrile, hemodynamically stable tachycardic, saturating well on 0.5 L trach mask. Labs notable for Trop 0.021 then 0.019 on repeat, leukocytosis 12.6. Ddimer elevation ~5000.  CTA chest: Filling defects in the right lower lobe pulmonary arterial branches consistent with acute pulmonary emboli. No evidence of right ventricular strain. CXR WNL. KUB with no acute process. US Duplex + for distal DVT Areas of abnormal echogenicity in the right posterior tibial vein, left greater saphenous, left peroneal and left anterior tibial veins suspicious for thrombi. Started on heparin gtt. Given reglan ang GI cocktal. Had a BM. Admitted for further management. On eval, per patient and her  present at bedside, reports has been having issues with constipation and bloating last couple of days which got worse. Woke up with worsened abdominal bloating/distention with nausea and vomiting. Had a large BM overnight, feels slightly better since then. No subjective fever, chills, dizziness, diarrhea. Dyspnea was in the setting of weaning down her oxygen needs following a weaning protcol. No chest pain, palpitations, syncope. Follows with Dr. Noel Rowell. Plan for weaning off trach with a procedure on 02/09/2023. \"    1/25/23: Resumed care of patient today. She is sitting up in bedside recliner, nontoxic in appearance, no apparent distress. Remains afebrile, satting 100% on room air, hemodynamically stable. Patient stating that she is still having an intermittent cough. States that at times it is blood-tinged but it is more clear today. She did remove her inner cannula and it was noted to have dried blood on it. Patient states she has not cleaned her cannula since yesterday. We will clean cannula, monitor overnight. Consider switching back to heparin. May need to consult Dr. Noel Rowell and ENT for further recommendations. Patient also stating she has not had a bowel movement yet in 5 days. States she is passing lots of gas but feels nauseous and mildly bloated today. Will obtain repeat KUB and start her on bowel regimen. Keep patient overnight for closer monitoring of minimal blood in trach tube.   Additionally patient has a PICC line has been in place since August of last year. Through the notes it appears that patient is very hard stick gets weekly lab draws and per note from Dr. Ailyn Luna in 8/2022 wanted patient to have her PICC line given that she would need recurrent procedures. X-ray showing the PICC line is not an appropriate place, will need to be removed and replaced for lab draws. We will keep her on IV fluids at [facility] rate to keep PICC line open while in hospital.    1/26/23: Afebrile, hemodynamically stable. Patient reports that she has having 10/10 left-sided facial pain her mouth. Unable to tell me if from top or bottom teeth. Reports that she has been having intermittent pains in her mouth for the past 3 to 4 days but did not think anything seriously of it. States she feels like left-sided her face is more swollen than the right. Was slightly warm to touch. Will start Unasyn and get CT of the face. ENT consulted. Patient reports intermittent blood tinged mucous coming from trach. States that it's clear green at times and then intermittently has some blood tinge to it. Patient also reports she has not had humidified oxygen through her trach mask d/t malfunction of humidifier. Discussed with ENT who does not believe patient needs pulmonology consult at this time. Believes bleeding may be from the mucosa being dried and trach care. Okay to resume [facility], but monitor for recurrence/worsened hemoptysis. 1/27/23: Afebrile, hemodynamically stable. No acute events overnight. Patient stating she feels better this evening. Currently rates her pain as a 7 out of 10. States that Mary Lou Furrow does seem to help. Reports that her face is not swollen. On exam her face does not appear as erythematous or warm. We will continue IV antibiotics. Patient stating her sputum is more clear today. Does report some intermittent blood-tinged sputum but no overt blood noted from tracheostomy itself or on gauze. H&H stable. Still reports that having a bowel movement.   States she is passing lots of gas. He does have active bowel sounds. We will add lactulose and as needed enema to bowel regimen. 1/28/23: Afebrile, hemodynamically stable. Patient stating she is doing better today. Reporting no blood-tinged mucus from her trach today. States she is feeling better. Still reporting 6/10 left-sided facial pain but states it is better than days prior. No erythema or warmth appreciated on left side of her cheek. Patient reports having 3 extremely large bowel movements today and is no longer having any bloating. Does report some nausea though. Did have 1 hypotensive pressure however repeat manual BP 92/60, will continue to hold Lasix and lisinopril at this time. Hemoglobin dropped to 8.2 however per night nurse patient had continuous IV fluids running all night. We will discontinue repeat H&H this afternoon. Keep patient overnight for closer monitoring. Awaiting ENT recommendations on IV antibiotics. Likely switch over to p.o. Augmentin tomorrow morning and discharge. Subjective (past 24 hours):      Denies chest pains, palpitations, dizziness, lightheadedness, shortness of breath at rest, ROSA, abdominal pain, nausea, vomiting, diarrhea, fever, chills.     Medications:  Reviewed    Infusion Medications    sodium chloride      dextrose       Scheduled Medications    calcium replacement protocol   Other RX Placeholder    insulin lispro  0-16 Units SubCUTAneous TID     insulin lispro  0-4 Units SubCUTAneous Nightly    docusate sodium  200 mg Oral BID    lidocaine  1 patch TransDERmal Daily    [Held by provider] lisinopril  5 mg Oral Daily    ampicillin-sulbactam  3,000 mg IntraVENous 4 times per day    senna  1 tablet Oral Nightly    polyethylene glycol  17 g Oral Daily    lidocaine 1 % injection  5 mL IntraDERmal Once    sodium chloride flush  5-40 mL IntraVENous 2 times per day    metoprolol succinate  25 mg Oral Daily    rivaroxaban  15 mg Oral BID     aspirin  81 mg Oral Daily    [Held by provider] furosemide  40 mg Oral Daily    rosuvastatin  10 mg Oral Daily    simethicone  80 mg Oral 4x Daily    insulin glargine  22 Units SubCUTAneous Nightly     PRN Meds: lactulose, sodium phosphate, HYDROcodone 5 mg - acetaminophen, lidocaine, sodium chloride flush, sodium chloride, albuterol, ondansetron **OR** ondansetron, polyethylene glycol, acetaminophen **OR** acetaminophen, albuterol sulfate HFA, nitroGLYCERIN, glucose, dextrose bolus **OR** dextrose bolus, glucagon (rDNA), dextrose      Intake/Output Summary (Last 24 hours) at 1/28/2023 2053  Last data filed at 1/28/2023 1631  Gross per 24 hour   Intake 120 ml   Output --   Net 120 ml         Diet:  ADULT DIET; Regular; 4 carb choices (60 gm/meal); Low Sodium (2 gm)    Exam:  /67   Pulse 96   Temp 98 °F (36.7 °C) (Oral)   Resp 15   Ht 5' 2\" (1.575 m)   Wt 210 lb (95.3 kg)   LMP  (LMP Unknown)   SpO2 98%   BMI 38.41 kg/m²     General appearance: No apparent distress, appears older than stated age and cooperative. Chronically ill in appearance. Nonverbal due to not having Passy-Janeth valve, but can communicate through writing and mouthing words. HEENT: Pupils equal, round, and reactive to light. Conjunctivae/corneas clear. Poor dentition, multiple caps and fillings in teeth. No mild left facial swelling, warmth. No abscesses appreciated on inner mouth and gum line. No cervical lymphadenopathy. Neck: Supple, with full range of motion. No jugular venous distention. Trachea midline with tracheostomy in place. . Inner cannula removed no scant crusts appreciated, no significant pink tinged mucous, bright red blood appreciated. Respiratory:  Normal respiratory effort. Clear to auscultation, bilaterally without Rales/Wheezes/Rhonchi. Cardiovascular: Regular rate and regular rhythm with normal S1/S2 without murmurs, rubs or gallops. Abdomen: Soft, non-tender, non-distended with normal bowel sounds.   Musculoskeletal: passive and active ROM x 4 extremities. Skin: Skin color, texture, turgor normal.  No rashes or lesions. Neurologic:  Neurovascularly intact without any focal sensory/motor deficits. Cranial nerves: II-XII intact, grossly non-focal.  Psychiatric: Alert and oriented, thought content appropriate, normal insight  Capillary Refill: Brisk,< 3 seconds   Peripheral Pulses: +2 palpable, equal bilaterally       Labs:   Recent Labs     01/26/23  0405 01/26/23  1000 01/27/23  0545 01/27/23  1345 01/28/23  0520 01/28/23  1345   WBC 5.1  --  6.1  --  5.0  --    HGB 8.4*   < > 8.8* 9.4* 8.2* 8.7*   HCT 26.3*   < > 27.3* 29.5* 26.0* 27.5*     --  197  --  191  --     < > = values in this interval not displayed. Recent Labs     01/26/23  0405 01/27/23  0545 01/28/23  0520    136 140   K 4.0 4.4 4.1    102 104   CO2 24 24 25   BUN 4* 6* 7   CREATININE 0.7 0.7 0.7   CALCIUM 8.4* 8.3* 7.8*     No results for input(s): AST, ALT, BILIDIR, BILITOT, ALKPHOS in the last 72 hours. No results for input(s): INR in the last 72 hours. No results for input(s): Corky Stallion in the last 72 hours. Microbiology:      Urinalysis:      Lab Results   Component Value Date/Time    NITRU NEGATIVE 01/21/2023 09:50 AM    WBCUA 10-15 01/21/2023 09:50 AM    BACTERIA NONE SEEN 01/21/2023 09:50 AM    RBCUA 0-2 01/21/2023 09:50 AM    BLOODU NEGATIVE 01/21/2023 09:50 AM    SPECGRAV 1.013 08/12/2022 12:30 PM    GLUCOSEU NEGATIVE 01/21/2023 09:50 AM       Radiology:  XR CHEST (2 VW)    Result Date: 1/21/2023  Portable semierect AP upright and lateral chest views. Comparison: CTA chest 4/13/2022. 3/27/2022 2 view chest. Findings: Cardiac silhouette projects enlarged part of which is due to prominent mediastinal fat on comparison CTA chest. No pulmonary venous congestion. No consolidation, pneumothorax, or defined pleural effusion. Left upper extremity PICC line terminates at the superior vena cava. Proximal tracheostomy tube.  No acute bony abnormality. Impression: No acute cardiopulmonary. This document has been electronically signed by: Cecy Jordan Spearing on 01/21/2023 04:56 AM    XR ABDOMEN (KUB) (SINGLE AP VIEW)    Result Date: 1/21/2023  Supine AP abdomen. Comparison: 1/10/2022 CT abdomen pelvis with contrast. Findings: Mild gaseous intestinal tract. No bowel obstruction. No free air. No apparent organomegaly. Properitoneal flank stripes are visualized. Psoas shadows are partially obscured due to technical factors. Chronic degenerative changes in the spine with levocurvature. Impression: No acute process. This document has been electronically signed by: Cecy Jordan Spearing on 01/21/2023 04:58 AM    CTA CHEST W WO CONTRAST    Result Date: 1/21/2023  PROCEDURE: CTA CHEST W WO CONTRAST CLINICAL INFORMATION: PE study Elevated D-dimer  tachycardia. COMPARISON: CTA chest dated 6 June 2022. . TECHNIQUE: 3 mm axial images were obtained through the chest after the administration of IV contrast.  A non-contrast localizer was obtained. 3D reconstructions were performed on the scanner to include MIP coronal and sagittal images through the chest. Isovue was the intravenous contrast utilized. All CT scans at this facility use dose modulation, iterative reconstruction, and/or weight-based dosing when appropriate to reduce radiation dose to as low as reasonably achievable. FINDINGS: There are filling defects in the right lower lobe pulmonary arterial branches consistent with pulmonary emboli. The left pulmonary artery appears be normal. There is mild dilatation of the main pulmonary artery. There is a tracheotomy tube in place. .  There is atherosclerotic calcification in the aortic arch. . There is borderline cardiomegaly. There is no evidence for right ventricular strain. There is increased attenuation in the left anterior descending coronary artery which could represent calcification versus stent placement.  There is no pericardial or pleural effusion. There is no mediastinal, axillary or hilar adenopathy. There is thickening off the interstitial lung markings. . There are no infiltrates. There is mild thoracic spondylosis. . There is a sclerotic density in the left eighth rib posteriorly. There is a small hiatal hernia. .      1. Filling defects in the right lower lobe pulmonary arterial branches consistent with acute pulmonary emboli. No evidence of right ventricular strain. 2.. Atherosclerotic calcification in the thoracic aorta. 3. Borderline cardiomegaly. Calcification versus stent placement in the left anterior descending coronary artery. 4. Thickening off the interstitial lung markings. 5. Thoracic spondylosis. 6. Sclerotic density in the left eighth rib posteriorly. 7. Small hiatal hernia. 9. Results called to Tali Cárdenas   at 950 AM **This report has been created using voice recognition software. It may contain minor errors which are inherent in voice recognition technology. ** Final report electronically signed by DR Ofelia Gibson on 1/21/2023 9:57 AM    VL DUP LOWER EXTREMITY VENOUS BILATERAL    Result Date: 1/21/2023  PROCEDURE: VL DUP LOWER EXTREMITY VENOUS BILATERAL CLINICAL INFORMATION: Bilateral lower extremity edema. COMPARISON: No prior study. TECHNIQUE: Venous doppler ultrasound was performed of the bilateral lower extremities using gray scale, color flow and spectral doppler imaging. FINDINGS: There is normal color flow, spectral analysis and compressibility of the right and left common femoral vein, femoral vein and popliteal veins. There is abnormal echogenicity in the right posterior tibial vein consistent with thrombus. There is abnormal echogenicity in the left greater saphenous, peroneal and possibly left anterior tibial veins, suspicious for thrombus. . There is normal color flow and compressibility in the left posterior tibial veins, right anterior tibial veins and right peroneal veins.      1. Areas of abnormal echogenicity in the right posterior tibial vein, left greater saphenous, left peroneal and left anterior tibial veins suspicious for thrombi. **This report has been created using voice recognition software. It may contain minor errors which are inherent in voice recognition technology. ** Final report electronically signed by DR Bob Zarate on 1/21/2023 11:28 AM      DVT prophylaxis: [] Lovenox                                 [] SCDs                                 [] SQ Heparin                                 [] Encourage ambulation           [x] Already on Anticoagulation     Code Status: Full Code    PT/OT Eval Status: None    Tele:   [x] yes             [] no    NSR with intermittent ST    Active Hospital Problems    Diagnosis Date Noted    PE (pulmonary thromboembolism) ([address] [I26.99] 01/25/2023     Priority: Medium    Pulmonary embolism on right ([address] [I26.99] 01/21/2023     Priority: Medium    Nausea and vomiting [R11.2] 01/21/2023     Priority: Medium    Abdominal bloating [R14.0] 01/21/2023     Priority: Medium    Deep vein thrombosis (DVT) of both lower extremities ([address] [I82.403] 01/21/2023     Priority: Medium    Hypotension [I95.9] 01/21/2023     Priority: Medium    Sinus tachycardia [R00.0] 01/21/2023     Priority: Medium    Leukocytosis [D72.829] 01/21/2023     Priority: Medium    Elevated troponin [R77.8] 01/21/2023     Priority: Medium    Bilateral leg edema [R60.0] 01/21/2023     Priority: Medium    Hyperlipidemia [E78.5] 01/21/2023     Priority: Medium    Tracheal stenosis [J39.8] 06/14/2022     Priority: Medium    Acute on chronic anemia [D64.9]      Priority: Medium    Tracheostomy in place [facility]) [Z93.0] 05/23/2022     Priority: Medium    Essential hypertension [I10] 01/14/2022    Type 2 diabetes mellitus with insulin therapy ([facility] [facility] [E11.9, Z79.4] 01/14/2022    History of coronary artery stent placement [Z95.5] 01/14/2022       Electronically signed by WYATT Barkley CNP on 1/28/2023 at 8:53 PM

## 2023-01-28 NOTE — RT PROTOCOL NOTE
RT Inhaler-Nebulizer Bronchodilator Protocol Note    There is a bronchodilator order in the chart from a provider indicating to follow the RT Bronchodilator Protocol and there is an Initiate RT Inhaler-Nebulizer Bronchodilator Protocol order as well (see protocol at bottom of note). CXR Findings:  No results found. The findings from the last RT Protocol Assessment were as follows:   History Pulmonary Disease: None or smoker <15 pack years  Respiratory Pattern: Regular pattern and RR 12-20 bpm  Breath Sounds: Clear breath sounds  Cough: Strong, productive  Indication for Bronchodilator Therapy:    Bronchodilator Assessment Score: 1    Aerosolized bronchodilator medication orders have been revised according to the RT Inhaler-Nebulizer Bronchodilator Protocol below. Respiratory Therapist to perform RT Therapy Protocol Assessment initially then follow the protocol. Repeat RT Therapy Protocol Assessment PRN for score 0-3 or on second treatment, BID, and PRN for scores above 3. No Indications - adjust the frequency to every 6 hours PRN wheezing or bronchospasm, if no treatments needed after 48 hours then discontinue using Per Protocol order mode. If indication present, adjust the RT bronchodilator orders based on the Bronchodilator Assessment Score as indicated below. Use Inhaler orders unless patient has one or more of the following: on home nebulizer, not able to hold breath for 10 seconds, is not alert and oriented, cannot activate and use MDI correctly, or respiratory rate 25 breaths per minute or more, then use the equivalent nebulizer order(s) with same Frequency and PRN reasons based on the score. If a patient is on this medication at home then do not decrease Frequency below that used at home. 0-3 - enter or revise RT bronchodilator order(s) to equivalent RT Bronchodilator order with Frequency of every 4 hours PRN for wheezing or increased work of breathing using Per Protocol order mode. 4-6 - enter or revise RT Bronchodilator order(s) to two equivalent RT bronchodilator orders with one order with BID Frequency and one order with Frequency of every 4 hours PRN wheezing or increased work of breathing using Per Protocol order mode. 7-10 - enter or revise RT Bronchodilator order(s) to two equivalent RT bronchodilator orders with one order with TID Frequency and one order with Frequency of every 4 hours PRN wheezing or increased work of breathing using Per Protocol order mode. 11-13 - enter or revise RT Bronchodilator order(s) to one equivalent RT bronchodilator order with QID Frequency and an Albuterol order with Frequency of every 4 hours PRN wheezing or increased work of breathing using Per Protocol order mode. Greater than 13 - enter or revise RT Bronchodilator order(s) to one equivalent RT bronchodilator order with every 4 hours Frequency and an Albuterol order with Frequency of every 2 hours PRN wheezing or increased work of breathing using Per Protocol order mode. RT to enter RT Home Evaluation for COPD & MDI Assessment order using Per Protocol order mode.     Electronically signed by Zaki August RCP on 1/28/2023 at 9:53 AM

## 2023-01-29 VITALS
RESPIRATION RATE: 20 BRPM | HEART RATE: 86 BPM | TEMPERATURE: 97.8 F | HEIGHT: 62 IN | DIASTOLIC BLOOD PRESSURE: 76 MMHG | SYSTOLIC BLOOD PRESSURE: 95 MMHG | WEIGHT: 210 LBS | OXYGEN SATURATION: 98 % | BODY MASS INDEX: 38.64 KG/M2

## 2023-01-29 LAB
CA-I BLD ISE-SCNC: 1.2 MMOL/L (ref 1.12–1.32)
GLUCOSE BLD STRIP.AUTO-MCNC: 104 MG/DL (ref 70–108)
GLUCOSE BLD STRIP.AUTO-MCNC: 123 MG/DL (ref 70–108)

## 2023-01-29 PROCEDURE — 82330 ASSAY OF CALCIUM: CPT

## 2023-01-29 PROCEDURE — 2580000003 HC RX 258

## 2023-01-29 PROCEDURE — 94760 N-INVAS EAR/PLS OXIMETRY 1: CPT

## 2023-01-29 PROCEDURE — 6370000000 HC RX 637 (ALT 250 FOR IP): Performed by: PHYSICIAN ASSISTANT

## 2023-01-29 PROCEDURE — 6360000002 HC RX W HCPCS

## 2023-01-29 PROCEDURE — 2700000000 HC OXYGEN THERAPY PER DAY

## 2023-01-29 PROCEDURE — 6370000000 HC RX 637 (ALT 250 FOR IP): Performed by: STUDENT IN AN ORGANIZED HEALTH CARE EDUCATION/TRAINING PROGRAM

## 2023-01-29 PROCEDURE — 6360000002 HC RX W HCPCS: Performed by: STUDENT IN AN ORGANIZED HEALTH CARE EDUCATION/TRAINING PROGRAM

## 2023-01-29 PROCEDURE — 82948 REAGENT STRIP/BLOOD GLUCOSE: CPT

## 2023-01-29 PROCEDURE — 99239 HOSP IP/OBS DSCHRG MGMT >30: CPT

## 2023-01-29 PROCEDURE — 6370000000 HC RX 637 (ALT 250 FOR IP)

## 2023-01-29 RX ORDER — INSULIN LISPRO 100 [IU]/ML
0-4 INJECTION, SOLUTION INTRAVENOUS; SUBCUTANEOUS NIGHTLY
Status: DISCONTINUED | OUTPATIENT
Start: 2023-01-29 | End: 2023-01-29 | Stop reason: HOSPADM

## 2023-01-29 RX ORDER — AMOXICILLIN AND CLAVULANATE POTASSIUM 875; 125 MG/1; MG/1
1 TABLET, FILM COATED ORAL 2 TIMES DAILY
Qty: 14 TABLET | Refills: 0 | Status: CANCELLED | OUTPATIENT
Start: 2023-01-29 | End: 2023-02-05

## 2023-01-29 RX ORDER — HYDROCODONE BITARTRATE AND ACETAMINOPHEN 5; 325 MG/1; MG/1
1 TABLET ORAL EVERY 6 HOURS PRN
Qty: 12 TABLET | Refills: 0 | Status: SHIPPED | OUTPATIENT
Start: 2023-01-29 | End: 2023-02-01

## 2023-01-29 RX ORDER — INSULIN GLARGINE 100 [IU]/ML
22 INJECTION, SOLUTION SUBCUTANEOUS NIGHTLY
Qty: 5 ADJUSTABLE DOSE PRE-FILLED PEN SYRINGE | Refills: 3 | Status: ON HOLD | OUTPATIENT
Start: 2023-01-29

## 2023-01-29 RX ORDER — SODIUM CHLORIDE 0.9 % (FLUSH) 0.9 %
5-40 SYRINGE (ML) INJECTION 2 TIMES DAILY
Qty: 1000 ML | Refills: 5 | Status: ON HOLD | OUTPATIENT
Start: 2023-01-29

## 2023-01-29 RX ORDER — SODIUM CHLORIDE 0.9 % (FLUSH) 0.9 %
5-40 SYRINGE (ML) INJECTION 2 TIMES DAILY
Qty: 1000 ML | Refills: 5 | Status: SHIPPED | OUTPATIENT
Start: 2023-01-29 | End: 2023-01-29 | Stop reason: SDUPTHER

## 2023-01-29 RX ORDER — PREDNISONE 10 MG/1
15 TABLET ORAL 2 TIMES DAILY
Qty: 90 TABLET | Refills: 0 | Status: SHIPPED | OUTPATIENT
Start: 2023-01-29 | End: 2023-01-29 | Stop reason: HOSPADM

## 2023-01-29 RX ORDER — SENNA PLUS 8.6 MG/1
1 TABLET ORAL NIGHTLY
Qty: 30 TABLET | Refills: 0 | Status: ON HOLD | OUTPATIENT
Start: 2023-01-29 | End: 2023-02-28

## 2023-01-29 RX ORDER — INSULIN LISPRO 100 [IU]/ML
0-4 INJECTION, SOLUTION INTRAVENOUS; SUBCUTANEOUS
Status: DISCONTINUED | OUTPATIENT
Start: 2023-01-29 | End: 2023-01-29 | Stop reason: HOSPADM

## 2023-01-29 RX ORDER — HYDROCODONE BITARTRATE AND ACETAMINOPHEN 5; 325 MG/1; MG/1
1 TABLET ORAL EVERY 6 HOURS PRN
Qty: 12 TABLET | Refills: 0 | Status: SHIPPED | OUTPATIENT
Start: 2023-01-29 | End: 2023-01-29 | Stop reason: SDUPTHER

## 2023-01-29 RX ORDER — ONDANSETRON 4 MG/1
8 TABLET, ORALLY DISINTEGRATING ORAL EVERY 8 HOURS PRN
Qty: 42 TABLET | Refills: 0 | Status: ON HOLD | OUTPATIENT
Start: 2023-01-29 | End: 2023-02-05

## 2023-01-29 RX ORDER — AMOXICILLIN AND CLAVULANATE POTASSIUM 875; 125 MG/1; MG/1
1 TABLET, FILM COATED ORAL 2 TIMES DAILY
Qty: 20 TABLET | Refills: 0 | Status: ON HOLD | OUTPATIENT
Start: 2023-01-29 | End: 2023-02-08

## 2023-01-29 RX ADMIN — ONDANSETRON 4 MG: 2 INJECTION INTRAMUSCULAR; INTRAVENOUS at 12:57

## 2023-01-29 RX ADMIN — DOCUSATE SODIUM 200 MG: 100 CAPSULE, LIQUID FILLED ORAL at 09:38

## 2023-01-29 RX ADMIN — SODIUM CHLORIDE 3000 MG: 900 INJECTION INTRAVENOUS at 00:01

## 2023-01-29 RX ADMIN — SIMETHICONE 80 MG: 80 TABLET, CHEWABLE ORAL at 09:43

## 2023-01-29 RX ADMIN — HYDROCODONE BITARTRATE AND ACETAMINOPHEN 1 TABLET: 5; 325 TABLET ORAL at 07:00

## 2023-01-29 RX ADMIN — POLYETHYLENE GLYCOL 3350 17 G: 17 POWDER, FOR SOLUTION ORAL at 09:38

## 2023-01-29 RX ADMIN — HYDROCODONE BITARTRATE AND ACETAMINOPHEN 1 TABLET: 5; 325 TABLET ORAL at 00:02

## 2023-01-29 RX ADMIN — RIVAROXABAN 15 MG: 15 TABLET, FILM COATED ORAL at 09:38

## 2023-01-29 RX ADMIN — SODIUM CHLORIDE 3000 MG: 900 INJECTION INTRAVENOUS at 05:33

## 2023-01-29 RX ADMIN — ROSUVASTATIN 10 MG: 10 TABLET, FILM COATED ORAL at 09:44

## 2023-01-29 RX ADMIN — ASPIRIN 81 MG 81 MG: 81 TABLET ORAL at 09:38

## 2023-01-29 RX ADMIN — SODIUM CHLORIDE, PRESERVATIVE FREE 10 ML: 5 INJECTION INTRAVENOUS at 09:43

## 2023-01-29 ASSESSMENT — PAIN DESCRIPTION - PAIN TYPE: TYPE: ACUTE PAIN

## 2023-01-29 ASSESSMENT — PAIN SCALES - GENERAL
PAINLEVEL_OUTOF10: 7
PAINLEVEL_OUTOF10: 8
PAINLEVEL_OUTOF10: 3

## 2023-01-29 ASSESSMENT — PAIN DESCRIPTION - LOCATION
LOCATION: FACE
LOCATION: JAW

## 2023-01-29 ASSESSMENT — PAIN DESCRIPTION - ORIENTATION
ORIENTATION: LEFT
ORIENTATION: LEFT

## 2023-01-29 ASSESSMENT — PAIN - FUNCTIONAL ASSESSMENT: PAIN_FUNCTIONAL_ASSESSMENT: ACTIVITIES ARE NOT PREVENTED

## 2023-01-29 ASSESSMENT — PAIN DESCRIPTION - ONSET
ONSET: ON-GOING
ONSET: ON-GOING

## 2023-01-29 ASSESSMENT — PAIN SCALES - WONG BAKER: WONGBAKER_NUMERICALRESPONSE: 2

## 2023-01-29 ASSESSMENT — PAIN DESCRIPTION - FREQUENCY: FREQUENCY: CONTINUOUS

## 2023-01-29 ASSESSMENT — PAIN DESCRIPTION - DESCRIPTORS
DESCRIPTORS: ACHING;THROBBING;DISCOMFORT
DESCRIPTORS: ACHING;DISCOMFORT

## 2023-01-29 NOTE — PROGRESS NOTES
Patient SPB in 90s and DBP in 50s, patient requesting for pain medication, this nurse informed patient that BP is low after VI ATB in 50ml normal saline, will recheck it and administer pain medication, patient was ok it it.

## 2023-01-29 NOTE — PROGRESS NOTES
PT discharging and verbalized understanding of written discharge instructions. PT VSS and denies CP,SOB or discomfort.  PT does continues to complain of pain 5-7/10 left facial which is relieved by 1 norco. PT waiting for transportation now

## 2023-01-29 NOTE — DISCHARGE INSTRUCTIONS
Please take Xarelto start pack as directed. You will need to follow up with cardiology within 1 week. Please follow up with your primary care doctor about your blood pressures and for post-hospital follow. Please get repeat CBC collected within 1 week upon discharge and follow up with your PCP. Follow up with Dr. Bg Sampson in office within 2 weeks to discuss surgery. Please see a dentist within 1 week for further evaluation of your tooth. Please take Augmentin (antibiotic) as directed. Please do not skip doses or stop taking medication despite improvement or resolution of symptoms. Please complete full therapy of medication. You may take Norco pain medication for severe pains. You can take Zofran to help with nausea  You can continue to take senna to help with bowel movements. You may also use over-the-counter MiraLAX and Colace. Please start taking 22 units Lantus nightly. You will need to follow-up with your PCP for further evaluation of your blood sugars. If you are to have new or worsening symptoms please seek immediate medical care and return back to the emergency department.

## 2023-01-29 NOTE — PLAN OF CARE
Problem: Chronic Conditions and Co-morbidities  Goal: Patient's chronic conditions and co-morbidity symptoms are monitored and maintained or improved  1/29/2023 0241 by Zhao Kenny RN  Outcome: Progressing  Flowsheets (Taken 1/28/2023 2100)  Care Plan - Patient's Chronic Conditions and Co-Morbidity Symptoms are Monitored and Maintained or Improved: Monitor and assess patient's chronic conditions and comorbid symptoms for stability, deterioration, or improvement   Collaborate with multidisciplinary team to address chronic and comorbid conditions and prevent exacerbation or deterioration   Update acute care plan with appropriate goals if chronic or comorbid symptoms are exacerbated and prevent overall improvement and discharge        Problem: Metabolic/Fluid and Electrolytes - Adult  Goal: Glucose maintained within prescribed range  1/29/2023 0241 by Zhao Kenny RN  Outcome: Progressing  Flowsheets (Taken 1/28/2023 2100)  Glucose maintained within prescribed range:   Monitor blood glucose as ordered   Assess for signs and symptoms of hyperglycemia and hypoglycemia   Administer ordered medications to maintain glucose within target range

## 2023-01-29 NOTE — CARE COORDINATION
1/29/23, 11:14 AM EST    Patient goals/plan/ treatment preferences discussed by  and . Patient goals/plan/ treatment preferences reviewed with patient/ family. Patient/ family verbalize understanding of discharge plan and are in agreement with goal/plan/treatment preferences. Understanding was demonstrated using the teach back method. AVS provided by RN at time of discharge, which includes all necessary medical information pertaining to the patients current course of illness, treatment, post-discharge goals of care, and treatment preferences. Received call that patient will be discharged home with [place] and need for saline flushes and picc dressing kits. Spoke with patient and , they use [address] in [place]. Spoke with pharmacy staff, reports they can only get sterile water flushes. Ask that they cancel script that was sent electronically. [facility] but r/t medicare regulations it would be out of pocket. They will follow-up with Claudene Law this week to discuss cost.   Spoke with on call nurse at . Marty Cavanaugh are able to supply the picc dressing kits. He reports they also can find a supplier for saline flushes. Scheduling for visit on Monday. Claudene Law and  updated on all above information. Did send home 5 days of flushes and alcohol swabs with patient. Pt verbalized understanding and gave permission for possible discharge within 4 hours of receiving IMM. Script [facility] flushes [facility] to [facility]  Script, AVS, order sent to [facility].      Services At/After Discharge: Home Health       IMM Letter  IMM Letter given to Patient/Family/Significant other/Guardian/POA/by[de-identified] Daja CRAWFORD CM  IMM Letter date given[de-identified] 01/29/23  IMM Letter time given[de-identified] 1004  Observation Status Letter date given[de-identified] 01/21/23  Observation Status Letter time given[de-identified] 0197  Observation Status Letter given to Patient/Family/Significant other/Guardian/POA/by[de-identified] Pt. Access

## 2023-01-29 NOTE — PLAN OF CARE
Problem: Discharge Planning  Goal: Discharge to home or other facility with appropriate resources  1/29/2023 1131 by Bela Hawkins RN  Outcome: Adequate for Discharge  1/29/2023 0241 by Lito Arriaza RN  Outcome: Progressing  Flowsheets (Taken 1/28/2023 2100)  Discharge to home or other facility with appropriate resources: Identify barriers to discharge with patient and caregiver     Problem: Skin/Tissue Integrity  Goal: Absence of new skin breakdown  Description: 1. Monitor for areas of redness and/or skin breakdown  2. Assess vascular access sites hourly  3. Every 4-6 hours minimum:  Change oxygen saturation probe site  4. Every 4-6 hours:  If on nasal continuous positive airway pressure, respiratory therapy assess nares and determine need for appliance change or resting period.   1/29/2023 1131 by Bela Hawkins RN  Outcome: Adequate for Discharge  1/29/2023 0241 by Lito Arriaza RN  Outcome: Progressing     Problem: Safety - Adult  Goal: Free from fall injury  1/29/2023 1131 by Bela Hawkins RN  Outcome: Adequate for Discharge  1/29/2023 0241 by Lito Arriaza RN  Outcome: Progressing     Problem: Chronic Conditions and Co-morbidities  Goal: Patient's chronic conditions and co-morbidity symptoms are monitored and maintained or improved  1/29/2023 1131 by Bela Hawkins RN  Outcome: Adequate for Discharge  1/29/2023 0241 by Lito Arriaza RN  Outcome: Progressing  [address] (Taken 1/28/2023 2100)  Care Plan - Patient's Chronic Conditions and Co-Morbidity Symptoms are Monitored and Maintained or Improved: Monitor and assess patient's chronic conditions and comorbid symptoms for stability, deterioration, or improvement     Problem: Pain  Goal: Verbalizes/displays adequate comfort level or baseline comfort level  1/29/2023 1131 by Bela Hawkins RN  Outcome: Adequate for Discharge  1/29/2023 0241 by Lito Arriaza RN  Outcome: Progressing     Problem: Metabolic/Fluid and Electrolytes - Adult  Goal: Glucose maintained within prescribed range  1/29/2023 1131 by Yassine Silverio RN  Outcome: Adequate for Discharge  1/29/2023 0241 by John Robert RN  Outcome: Progressing  Flowsheets (Taken 1/28/2023 2100)  Glucose maintained within prescribed range:   Monitor blood glucose as ordered   Assess for signs and symptoms of hyperglycemia and hypoglycemia   Administer ordered medications to maintain glucose within target range

## 2023-01-30 ENCOUNTER — TELEPHONE (OUTPATIENT)
Dept: ENT CLINIC | Age: 73
End: 2023-01-30

## 2023-01-30 NOTE — TELEPHONE ENCOUNTER
Patient was admitted to hospital last week for acute PE and DVTs. She was shceudled for surgery with Dr Osiel Hickman on 2/9/23 and has her cardiac clearance scheduled for 2/2/23. The DVT/PE were thought to be secondary to limited mobility/activity, but will likely require at least several months of Xarelto. Would you recommend waiting to see what cardiology recommends recommends regarding the Xarelto duration and coordinate from there?

## 2023-02-01 ENCOUNTER — APPOINTMENT (OUTPATIENT)
Dept: CT IMAGING | Age: 73
End: 2023-02-01
Payer: MEDICARE

## 2023-02-01 ENCOUNTER — HOSPITAL ENCOUNTER (INPATIENT)
Age: 73
LOS: 8 days | Discharge: NURSING, REHAB OR LONG-TERM CARE | End: 2023-02-10
Attending: EMERGENCY MEDICINE
Payer: MEDICARE

## 2023-02-01 DIAGNOSIS — S72.141A CLOSED DISPLACED INTERTROCHANTERIC FRACTURE OF RIGHT FEMUR, INITIAL ENCOUNTER (HCC): ICD-10-CM

## 2023-02-01 DIAGNOSIS — I50.31 ACUTE DIASTOLIC HEART FAILURE (HCC): ICD-10-CM

## 2023-02-01 DIAGNOSIS — R79.1 SUPRATHERAPEUTIC INR: ICD-10-CM

## 2023-02-01 DIAGNOSIS — M79.89 LEG SWELLING: Primary | ICD-10-CM
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
## 2023-02-01 LAB
ALBUMIN SERPL BCG-MCNC: 3.1 G/DL (ref 3.5–5.1)
ALP SERPL-CCNC: 95 U/L (ref 38–126)
ALT SERPL W/O P-5'-P-CCNC: 12 U/L (ref 11–66)
ANION GAP SERPL CALC-SCNC: 13 MEQ/L (ref 8–16)
AST SERPL-CCNC: 14 U/L (ref 5–40)
BASOPHILS ABSOLUTE: 0 THOU/MM3 (ref 0–0.1)
BASOPHILS NFR BLD AUTO: 0.5 %
BILIRUB CONJ SERPL-MCNC: < 0.2 MG/DL (ref 0–0.3)
BILIRUB SERPL-MCNC: 0.4 MG/DL (ref 0.3–1.2)
BUN SERPL-MCNC: 6 MG/DL (ref 7–22)
CALCIUM SERPL-MCNC: 8.7 MG/DL (ref 8.5–10.5)
CHLORIDE SERPL-SCNC: 102 MEQ/L (ref 98–111)
CO2 SERPL-SCNC: 23 MEQ/L (ref 23–33)
CREAT SERPL-MCNC: 0.7 MG/DL (ref 0.4–1.2)
DEPRECATED RDW RBC AUTO: 53 FL (ref 35–45)
EOSINOPHIL NFR BLD AUTO: 1.2 %
EOSINOPHILS ABSOLUTE: 0.1 THOU/MM3 (ref 0–0.4)
ERYTHROCYTE [DISTWIDTH] IN BLOOD BY AUTOMATED COUNT: 15.3 % (ref 11.5–14.5)
GFR SERPL CREATININE-BSD FRML MDRD: > 60 ML/MIN/1.73M2
GLUCOSE SERPL-MCNC: 138 MG/DL (ref 70–108)
HCT VFR BLD AUTO: 26.7 % (ref 37–47)
HGB BLD-MCNC: 8.8 GM/DL (ref 12–16)
IMM GRANULOCYTES # BLD AUTO: 0.18 THOU/MM3 (ref 0–0.07)
IMM GRANULOCYTES NFR BLD AUTO: 2.8 %
INR PPP: 3.83 (ref 0.85–1.13)
LIPASE SERPL-CCNC: 15.4 U/L (ref 5.6–51.3)
LYMPHOCYTES ABSOLUTE: 1.2 THOU/MM3 (ref 1–4.8)
LYMPHOCYTES NFR BLD AUTO: 19.5 %
MAGNESIUM SERPL-MCNC: 1.5 MG/DL (ref 1.6–2.4)
MCH RBC QN AUTO: 32 PG (ref 26–33)
MCHC RBC AUTO-ENTMCNC: 33 GM/DL (ref 32.2–35.5)
MCV RBC AUTO: 97.1 FL (ref 81–99)
MONOCYTES ABSOLUTE: 0.5 THOU/MM3 (ref 0.4–1.3)
MONOCYTES NFR BLD AUTO: 7.3 %
NEUTROPHILS NFR BLD AUTO: 68.7 %
NRBC BLD AUTO-RTO: 0 /100 WBC
NT-PROBNP SERPL IA-MCNC: 256.7 PG/ML (ref 0–124)
OSMOLALITY SERPL CALC.SUM OF ELEC: 275.5 MOSMOL/KG (ref 275–300)
PLATELET # BLD AUTO: 232 THOU/MM3 (ref 130–400)
PMV BLD AUTO: 9.8 FL (ref 9.4–12.4)
POTASSIUM SERPL-SCNC: 3.8 MEQ/L (ref 3.5–5.2)
PROT SERPL-MCNC: 5.2 G/DL (ref 6.1–8)
RBC # BLD AUTO: 2.75 MILL/MM3 (ref 4.2–5.4)
SEGMENTED NEUTROPHILS ABSOLUTE COUNT: 4.4 THOU/MM3 (ref 1.8–7.7)
SODIUM SERPL-SCNC: 138 MEQ/L (ref 135–145)
TROPONIN T: 0.03 NG/ML
WBC # BLD AUTO: 6.4 THOU/MM3 (ref 4.8–10.8)

## 2023-02-01 PROCEDURE — 99285 EMERGENCY DEPT VISIT HI MDM: CPT

## 2023-02-01 PROCEDURE — 85730 THROMBOPLASTIN TIME PARTIAL: CPT

## 2023-02-01 PROCEDURE — 83880 ASSAY OF NATRIURETIC PEPTIDE: CPT

## 2023-02-01 PROCEDURE — 93005 ELECTROCARDIOGRAM TRACING: CPT | Performed by: EMERGENCY MEDICINE

## 2023-02-01 PROCEDURE — 80053 COMPREHEN METABOLIC PANEL: CPT

## 2023-02-01 PROCEDURE — 85025 COMPLETE CBC W/AUTO DIFF WBC: CPT

## 2023-02-01 PROCEDURE — 82248 BILIRUBIN DIRECT: CPT

## 2023-02-01 PROCEDURE — 71275 CT ANGIOGRAPHY CHEST: CPT

## 2023-02-01 PROCEDURE — 85610 PROTHROMBIN TIME: CPT

## 2023-02-01 PROCEDURE — 83735 ASSAY OF MAGNESIUM: CPT

## 2023-02-01 PROCEDURE — 84484 ASSAY OF TROPONIN QUANT: CPT

## 2023-02-01 PROCEDURE — 96374 THER/PROPH/DIAG INJ IV PUSH: CPT

## 2023-02-01 PROCEDURE — 36415 COLL VENOUS BLD VENIPUNCTURE: CPT

## 2023-02-01 PROCEDURE — 96375 TX/PRO/DX INJ NEW DRUG ADDON: CPT

## 2023-02-01 PROCEDURE — 83690 ASSAY OF LIPASE: CPT

## 2023-02-01 PROCEDURE — 6360000002 HC RX W HCPCS: Performed by: EMERGENCY MEDICINE

## 2023-02-01 RX ORDER — ONDANSETRON 2 MG/ML
4 INJECTION INTRAMUSCULAR; INTRAVENOUS ONCE
Status: COMPLETED | OUTPATIENT
Start: 2023-02-01 | End: 2023-02-01

## 2023-02-01 RX ORDER — LORAZEPAM 2 MG/ML
1 INJECTION INTRAMUSCULAR ONCE
Status: COMPLETED | OUTPATIENT
Start: 2023-02-01 | End: 2023-02-01

## 2023-02-01 RX ORDER — MAGNESIUM SULFATE 1 G/100ML
1000 INJECTION INTRAVENOUS ONCE
Status: COMPLETED | OUTPATIENT
Start: 2023-02-02 | End: 2023-02-02

## 2023-02-01 RX ADMIN — ONDANSETRON 4 MG: 2 INJECTION INTRAMUSCULAR; INTRAVENOUS at 22:43

## 2023-02-01 RX ADMIN — LORAZEPAM 1 MG: 2 INJECTION INTRAMUSCULAR; INTRAVENOUS at 22:43

## 2023-02-01 ASSESSMENT — ENCOUNTER SYMPTOMS
EYE PAIN: 0
EYE ITCHING: 0
BACK PAIN: 0
PHOTOPHOBIA: 0
CONSTIPATION: 0
EYE REDNESS: 0
VOMITING: 0
COUGH: 1
SINUS PRESSURE: 0
TROUBLE SWALLOWING: 0
WHEEZING: 0
CHEST TIGHTNESS: 0
EYE DISCHARGE: 0
VOICE CHANGE: 0
SORE THROAT: 0
ABDOMINAL PAIN: 0
ABDOMINAL DISTENTION: 0
RHINORRHEA: 0
NAUSEA: 0
DIARRHEA: 0
CHOKING: 0
BLOOD IN STOOL: 0
SHORTNESS OF BREATH: 1

## 2023-02-01 ASSESSMENT — PAIN SCALES - GENERAL: PAINLEVEL_OUTOF10: 8

## 2023-02-01 ASSESSMENT — PAIN DESCRIPTION - LOCATION: LOCATION: BACK

## 2023-02-01 ASSESSMENT — PAIN DESCRIPTION - FREQUENCY: FREQUENCY: CONTINUOUS

## 2023-02-01 ASSESSMENT — PAIN DESCRIPTION - ORIENTATION: ORIENTATION: MID

## 2023-02-01 ASSESSMENT — PAIN DESCRIPTION - ONSET: ONSET: ON-GOING

## 2023-02-01 ASSESSMENT — PAIN - FUNCTIONAL ASSESSMENT
PAIN_FUNCTIONAL_ASSESSMENT: 0-10
PAIN_FUNCTIONAL_ASSESSMENT: NONE - DENIES PAIN

## 2023-02-01 NOTE — TELEPHONE ENCOUNTER
I spoke to Laurie Sutton [facility]) and he understood they will see Dr Vinod Hinojosa on 2/2/23 and discuss how long Kanwal Salcido will need to be on blood thinners prior to being able to come off of them in order to have surgery. He asked if she could keep her original \"post op\" appointment for now to come in to discuss how he will be able to handle her trach change. He will be in touch if that needs cancelled. Surgery will be on hold until further notice from Dr Vinod Hinojosa.

## 2023-02-02 ENCOUNTER — APPOINTMENT (OUTPATIENT)
Dept: CT IMAGING | Age: 73
End: 2023-02-02
Payer: MEDICARE

## 2023-02-02 ENCOUNTER — APPOINTMENT (OUTPATIENT)
Dept: GENERAL RADIOLOGY | Age: 73
End: 2023-02-02
Payer: MEDICARE

## 2023-02-02 PROBLEM — M79.89 LEG SWELLING: Status: ACTIVE | Noted: 2023-02-02

## 2023-02-02 PROBLEM — I50.9 ACUTE HEART FAILURE, UNSPECIFIED HEART FAILURE TYPE (HCC): Status: ACTIVE | Noted: 2023-02-02

## 2023-02-02 LAB
ANION GAP SERPL CALC-SCNC: 18 MEQ/L (ref 8–16)
APTT PPP: 53.3 SECONDS (ref 22–38)
BACTERIA: ABNORMAL
BILIRUB UR QL STRIP.AUTO: NEGATIVE
BILIRUB UR QL STRIP: NEGATIVE
BUN SERPL-MCNC: 4 MG/DL (ref 7–22)
CALCIUM SERPL-MCNC: 8 MG/DL (ref 8.5–10.5)
CASTS #/AREA URNS LPF: ABNORMAL /LPF
CASTS #/AREA URNS LPF: ABNORMAL /LPF
CHARACTER UR: CLEAR
CHARACTER UR: CLEAR
CHARCOAL URNS QL MICRO: ABNORMAL
CHLORIDE SERPL-SCNC: 101 MEQ/L (ref 98–111)
CO2 SERPL-SCNC: 23 MEQ/L (ref 23–33)
COLOR UR: YELLOW
COLOR: YELLOW
CREAT SERPL-MCNC: 0.7 MG/DL (ref 0.4–1.2)
CRYSTALS URNS QL MICRO: ABNORMAL
EKG ATRIAL RATE: 101 BPM
EKG ATRIAL RATE: 107 BPM
EKG P AXIS: 26 DEGREES
EKG P AXIS: 41 DEGREES
EKG P-R INTERVAL: 144 MS
EKG P-R INTERVAL: 148 MS
EKG Q-T INTERVAL: 372 MS
EKG Q-T INTERVAL: 394 MS
EKG QRS DURATION: 80 MS
EKG QRS DURATION: 82 MS
EKG QTC CALCULATION (BAZETT): 496 MS
EKG QTC CALCULATION (BAZETT): 510 MS
EKG R AXIS: 41 DEGREES
EKG R AXIS: 47 DEGREES
EKG T AXIS: 38 DEGREES
EKG T AXIS: 40 DEGREES
EKG VENTRICULAR RATE: 101 BPM
EKG VENTRICULAR RATE: 107 BPM
EPITHELIAL CELLS, UA: ABNORMAL /HPF
GFR SERPL CREATININE-BSD FRML MDRD: > 60 ML/MIN/1.73M2
GLUCOSE BLD STRIP.AUTO-MCNC: 128 MG/DL (ref 70–108)
GLUCOSE SERPL-MCNC: 138 MG/DL (ref 70–108)
GLUCOSE UR QL STRIP.AUTO: NEGATIVE MG/DL
GLUCOSE UR QL STRIP.AUTO: NEGATIVE MG/DL
HGB UR QL STRIP.AUTO: ABNORMAL
HGB UR QL STRIP.AUTO: NEGATIVE
INR PPP: 3.48 (ref 0.85–1.13)
IRON SERPL-MCNC: 48 UG/DL (ref 50–170)
KETONES UR QL STRIP.AUTO: 15
KETONES UR QL STRIP.AUTO: ABNORMAL
LACTATE SERPL-SCNC: 0.8 MMOL/L (ref 0.5–2)
LEUKOCYTE ESTERASE UR QL STRIP.AUTO: ABNORMAL
MAGNESIUM SERPL-MCNC: 1.9 MG/DL (ref 1.6–2.4)
MRSA DNA SPEC QL NAA+PROBE: NEGATIVE
NITRITE UR QL STRIP.AUTO: NEGATIVE
NITRITE UR QL STRIP: NEGATIVE
PH UR STRIP.AUTO: 5 [PH] (ref 5–9)
PH UR STRIP.AUTO: 5.5 [PH] (ref 5–9)
POTASSIUM SERPL-SCNC: 3.7 MEQ/L (ref 3.5–5.2)
PROT UR STRIP.AUTO-MCNC: NEGATIVE MG/DL
PROT UR STRIP.AUTO-MCNC: NEGATIVE MG/DL
RBC #/AREA URNS HPF: ABNORMAL /HPF
RENAL EPI CELLS #/AREA URNS HPF: ABNORMAL /[HPF]
SODIUM SERPL-SCNC: 142 MEQ/L (ref 135–145)
SP GR UR REFRACT.AUTO: 1.02 (ref 1–1.03)
SPECIFIC GRAVITY UA: > 1.03 (ref 1–1.03)
T4 FREE SERPL-MCNC: 1.13 NG/DL (ref 0.93–1.76)
TIBC SERPL-MCNC: 184 UG/DL (ref 171–450)
TROPONIN T: 0.03 NG/ML
TSH SERPL DL<=0.005 MIU/L-ACNC: 6.88 UIU/ML (ref 0.4–4.2)
UROBILINOGEN, URINE: 0.2 EU/DL (ref 0–1)
UROBILINOGEN, URINE: 0.2 EU/DL (ref 0–1)
VANA ISLT/SPM QL: NEGATIVE
WBC #/AREA URNS HPF: ABNORMAL /HPF
WBC #/AREA URNS HPF: NEGATIVE /[HPF]
YEAST LIKE FUNGI URNS QL MICRO: ABNORMAL

## 2023-02-02 PROCEDURE — 87500 VANOMYCIN DNA AMP PROBE: CPT

## 2023-02-02 PROCEDURE — 70450 CT HEAD/BRAIN W/O DYE: CPT

## 2023-02-02 PROCEDURE — 6360000002 HC RX W HCPCS: Performed by: INTERNAL MEDICINE

## 2023-02-02 PROCEDURE — 36415 COLL VENOUS BLD VENIPUNCTURE: CPT

## 2023-02-02 PROCEDURE — 2580000003 HC RX 258

## 2023-02-02 PROCEDURE — 02HV33Z INSERTION OF INFUSION DEVICE INTO SUPERIOR VENA CAVA, PERCUTANEOUS APPROACH: ICD-10-PCS | Performed by: RADIOLOGY

## 2023-02-02 PROCEDURE — 83550 IRON BINDING TEST: CPT

## 2023-02-02 PROCEDURE — 85610 PROTHROMBIN TIME: CPT

## 2023-02-02 PROCEDURE — 73502 X-RAY EXAM HIP UNI 2-3 VIEWS: CPT

## 2023-02-02 PROCEDURE — 71045 X-RAY EXAM CHEST 1 VIEW: CPT

## 2023-02-02 PROCEDURE — 87077 CULTURE AEROBIC IDENTIFY: CPT

## 2023-02-02 PROCEDURE — 6370000000 HC RX 637 (ALT 250 FOR IP): Performed by: INTERNAL MEDICINE

## 2023-02-02 PROCEDURE — 6360000002 HC RX W HCPCS: Performed by: STUDENT IN AN ORGANIZED HEALTH CARE EDUCATION/TRAINING PROGRAM

## 2023-02-02 PROCEDURE — 6360000004 HC RX CONTRAST MEDICATION: Performed by: EMERGENCY MEDICINE

## 2023-02-02 PROCEDURE — 84443 ASSAY THYROID STIM HORMONE: CPT

## 2023-02-02 PROCEDURE — 99223 1ST HOSP IP/OBS HIGH 75: CPT

## 2023-02-02 PROCEDURE — 93010 ELECTROCARDIOGRAM REPORT: CPT | Performed by: INTERNAL MEDICINE

## 2023-02-02 PROCEDURE — 84484 ASSAY OF TROPONIN QUANT: CPT

## 2023-02-02 PROCEDURE — 87186 SC STD MICRODIL/AGAR DIL: CPT

## 2023-02-02 PROCEDURE — 96366 THER/PROPH/DIAG IV INF ADDON: CPT

## 2023-02-02 PROCEDURE — 6360000002 HC RX W HCPCS

## 2023-02-02 PROCEDURE — 96375 TX/PRO/DX INJ NEW DRUG ADDON: CPT

## 2023-02-02 PROCEDURE — 82948 REAGENT STRIP/BLOOD GLUCOSE: CPT

## 2023-02-02 PROCEDURE — 87070 CULTURE OTHR SPECIMN AEROBIC: CPT

## 2023-02-02 PROCEDURE — 83605 ASSAY OF LACTIC ACID: CPT

## 2023-02-02 PROCEDURE — 96365 THER/PROPH/DIAG IV INF INIT: CPT

## 2023-02-02 PROCEDURE — 83540 ASSAY OF IRON: CPT

## 2023-02-02 PROCEDURE — 2700000000 HC OXYGEN THERAPY PER DAY

## 2023-02-02 PROCEDURE — 2500000003 HC RX 250 WO HCPCS: Performed by: EMERGENCY MEDICINE

## 2023-02-02 PROCEDURE — 1200000003 HC TELEMETRY R&B

## 2023-02-02 PROCEDURE — 83735 ASSAY OF MAGNESIUM: CPT

## 2023-02-02 PROCEDURE — 87205 SMEAR GRAM STAIN: CPT

## 2023-02-02 PROCEDURE — 6370000000 HC RX 637 (ALT 250 FOR IP): Performed by: NURSE PRACTITIONER

## 2023-02-02 PROCEDURE — 6370000000 HC RX 637 (ALT 250 FOR IP): Performed by: STUDENT IN AN ORGANIZED HEALTH CARE EDUCATION/TRAINING PROGRAM

## 2023-02-02 PROCEDURE — 99223 1ST HOSP IP/OBS HIGH 75: CPT | Performed by: INTERNAL MEDICINE

## 2023-02-02 PROCEDURE — 93005 ELECTROCARDIOGRAM TRACING: CPT

## 2023-02-02 PROCEDURE — 84439 ASSAY OF FREE THYROXINE: CPT

## 2023-02-02 PROCEDURE — 99222 1ST HOSP IP/OBS MODERATE 55: CPT | Performed by: NURSE PRACTITIONER

## 2023-02-02 PROCEDURE — 81003 URINALYSIS AUTO W/O SCOPE: CPT

## 2023-02-02 PROCEDURE — 80048 BASIC METABOLIC PNL TOTAL CA: CPT

## 2023-02-02 PROCEDURE — 6360000002 HC RX W HCPCS: Performed by: EMERGENCY MEDICINE

## 2023-02-02 PROCEDURE — 6370000000 HC RX 637 (ALT 250 FOR IP)

## 2023-02-02 PROCEDURE — 72125 CT NECK SPINE W/O DYE: CPT

## 2023-02-02 PROCEDURE — 96376 TX/PRO/DX INJ SAME DRUG ADON: CPT

## 2023-02-02 PROCEDURE — 81001 URINALYSIS AUTO W/SCOPE: CPT

## 2023-02-02 PROCEDURE — 51702 INSERT TEMP BLADDER CATH: CPT | Performed by: NURSE PRACTITIONER

## 2023-02-02 PROCEDURE — 74018 RADEX ABDOMEN 1 VIEW: CPT

## 2023-02-02 PROCEDURE — 87641 MR-STAPH DNA AMP PROBE: CPT

## 2023-02-02 RX ORDER — HYDROXYZINE HYDROCHLORIDE 50 MG/ML
25 INJECTION, SOLUTION INTRAMUSCULAR EVERY 6 HOURS PRN
Status: DISCONTINUED | OUTPATIENT
Start: 2023-02-02 | End: 2023-02-10 | Stop reason: HOSPADM

## 2023-02-02 RX ORDER — ONDANSETRON 2 MG/ML
4 INJECTION INTRAMUSCULAR; INTRAVENOUS ONCE
Status: COMPLETED | OUTPATIENT
Start: 2023-02-02 | End: 2023-02-02

## 2023-02-02 RX ORDER — LORAZEPAM 2 MG/ML
1 INJECTION INTRAMUSCULAR ONCE
Status: COMPLETED | OUTPATIENT
Start: 2023-02-02 | End: 2023-02-02

## 2023-02-02 RX ORDER — INSULIN GLARGINE 100 [IU]/ML
22 INJECTION, SOLUTION SUBCUTANEOUS NIGHTLY
Status: DISCONTINUED | OUTPATIENT
Start: 2023-02-02 | End: 2023-02-04

## 2023-02-02 RX ORDER — POTASSIUM CHLORIDE 7.45 MG/ML
10 INJECTION INTRAVENOUS PRN
Status: DISCONTINUED | OUTPATIENT
Start: 2023-02-02 | End: 2023-02-10 | Stop reason: HOSPADM

## 2023-02-02 RX ORDER — POTASSIUM CHLORIDE 20 MEQ/1
40 TABLET, EXTENDED RELEASE ORAL ONCE
Status: COMPLETED | OUTPATIENT
Start: 2023-02-02 | End: 2023-02-02

## 2023-02-02 RX ORDER — SENNA PLUS 8.6 MG/1
1 TABLET ORAL NIGHTLY
Status: DISCONTINUED | OUTPATIENT
Start: 2023-02-02 | End: 2023-02-10 | Stop reason: HOSPADM

## 2023-02-02 RX ORDER — POLYETHYLENE GLYCOL 3350 17 G/17G
17 POWDER, FOR SOLUTION ORAL DAILY PRN
Status: DISCONTINUED | OUTPATIENT
Start: 2023-02-02 | End: 2023-02-05

## 2023-02-02 RX ORDER — INSULIN LISPRO 100 [IU]/ML
0-4 INJECTION, SOLUTION INTRAVENOUS; SUBCUTANEOUS NIGHTLY
Status: DISCONTINUED | OUTPATIENT
Start: 2023-02-02 | End: 2023-02-10 | Stop reason: HOSPADM

## 2023-02-02 RX ORDER — PHYTONADIONE 5 MG/1
5 TABLET ORAL ONCE
Status: COMPLETED | OUTPATIENT
Start: 2023-02-02 | End: 2023-02-02

## 2023-02-02 RX ORDER — SCOLOPAMINE TRANSDERMAL SYSTEM 1 MG/1
1 PATCH, EXTENDED RELEASE TRANSDERMAL
Status: DISCONTINUED | OUTPATIENT
Start: 2023-02-02 | End: 2023-02-05

## 2023-02-02 RX ORDER — ASPIRIN 81 MG/1
81 TABLET, CHEWABLE ORAL DAILY
Status: DISCONTINUED | OUTPATIENT
Start: 2023-02-02 | End: 2023-02-03

## 2023-02-02 RX ORDER — ENOXAPARIN SODIUM 100 MG/ML
40 INJECTION SUBCUTANEOUS DAILY
Status: DISCONTINUED | OUTPATIENT
Start: 2023-02-02 | End: 2023-02-02

## 2023-02-02 RX ORDER — SODIUM CHLORIDE 0.9 % (FLUSH) 0.9 %
5-40 SYRINGE (ML) INJECTION EVERY 12 HOURS SCHEDULED
Status: DISCONTINUED | OUTPATIENT
Start: 2023-02-02 | End: 2023-02-10 | Stop reason: HOSPADM

## 2023-02-02 RX ORDER — GUAIFENESIN 200 MG/10ML
400 LIQUID ORAL 2 TIMES DAILY PRN
Status: DISCONTINUED | OUTPATIENT
Start: 2023-02-02 | End: 2023-02-10 | Stop reason: HOSPADM

## 2023-02-02 RX ORDER — METOPROLOL SUCCINATE 25 MG/1
25 TABLET, EXTENDED RELEASE ORAL DAILY
Status: DISCONTINUED | OUTPATIENT
Start: 2023-02-02 | End: 2023-02-05

## 2023-02-02 RX ORDER — POTASSIUM CHLORIDE 20 MEQ/1
40 TABLET, EXTENDED RELEASE ORAL PRN
Status: DISCONTINUED | OUTPATIENT
Start: 2023-02-02 | End: 2023-02-10 | Stop reason: HOSPADM

## 2023-02-02 RX ORDER — OXYCODONE HYDROCHLORIDE 5 MG/1
2.5 TABLET ORAL EVERY 4 HOURS PRN
Status: DISCONTINUED | OUTPATIENT
Start: 2023-02-02 | End: 2023-02-10 | Stop reason: HOSPADM

## 2023-02-02 RX ORDER — BUMETANIDE 0.25 MG/ML
1 INJECTION, SOLUTION INTRAMUSCULAR; INTRAVENOUS ONCE
Status: COMPLETED | OUTPATIENT
Start: 2023-02-02 | End: 2023-02-02

## 2023-02-02 RX ORDER — ROSUVASTATIN CALCIUM 10 MG/1
10 TABLET, COATED ORAL DAILY
Status: DISCONTINUED | OUTPATIENT
Start: 2023-02-02 | End: 2023-02-10 | Stop reason: HOSPADM

## 2023-02-02 RX ORDER — FUROSEMIDE 10 MG/ML
20 INJECTION INTRAMUSCULAR; INTRAVENOUS ONCE
Status: COMPLETED | OUTPATIENT
Start: 2023-02-02 | End: 2023-02-02

## 2023-02-02 RX ORDER — SODIUM CHLORIDE 0.9 % (FLUSH) 0.9 %
5-40 SYRINGE (ML) INJECTION PRN
Status: DISCONTINUED | OUTPATIENT
Start: 2023-02-02 | End: 2023-02-10 | Stop reason: HOSPADM

## 2023-02-02 RX ORDER — TIZANIDINE 4 MG/1
4 TABLET ORAL EVERY 8 HOURS PRN
Status: DISCONTINUED | OUTPATIENT
Start: 2023-02-02 | End: 2023-02-05

## 2023-02-02 RX ORDER — ONDANSETRON 2 MG/ML
4 INJECTION INTRAMUSCULAR; INTRAVENOUS EVERY 6 HOURS PRN
Status: DISCONTINUED | OUTPATIENT
Start: 2023-02-02 | End: 2023-02-02

## 2023-02-02 RX ORDER — MAGNESIUM SULFATE IN WATER 40 MG/ML
2000 INJECTION, SOLUTION INTRAVENOUS PRN
Status: DISCONTINUED | OUTPATIENT
Start: 2023-02-02 | End: 2023-02-10 | Stop reason: HOSPADM

## 2023-02-02 RX ORDER — INSULIN LISPRO 100 [IU]/ML
0-8 INJECTION, SOLUTION INTRAVENOUS; SUBCUTANEOUS
Status: DISCONTINUED | OUTPATIENT
Start: 2023-02-02 | End: 2023-02-10 | Stop reason: HOSPADM

## 2023-02-02 RX ORDER — OXYCODONE HYDROCHLORIDE 5 MG/1
5 TABLET ORAL EVERY 4 HOURS PRN
Status: DISCONTINUED | OUTPATIENT
Start: 2023-02-02 | End: 2023-02-05

## 2023-02-02 RX ORDER — ACETAMINOPHEN 325 MG/1
650 TABLET ORAL EVERY 6 HOURS PRN
Status: DISCONTINUED | OUTPATIENT
Start: 2023-02-02 | End: 2023-02-10 | Stop reason: HOSPADM

## 2023-02-02 RX ORDER — MORPHINE SULFATE 4 MG/ML
4 INJECTION, SOLUTION INTRAMUSCULAR; INTRAVENOUS ONCE
Status: COMPLETED | OUTPATIENT
Start: 2023-02-02 | End: 2023-02-02

## 2023-02-02 RX ORDER — ACETAMINOPHEN 650 MG/1
650 SUPPOSITORY RECTAL EVERY 6 HOURS PRN
Status: DISCONTINUED | OUTPATIENT
Start: 2023-02-02 | End: 2023-02-10 | Stop reason: HOSPADM

## 2023-02-02 RX ORDER — CEPHALEXIN 500 MG/1
500 CAPSULE ORAL EVERY 6 HOURS SCHEDULED
Status: DISPENSED | OUTPATIENT
Start: 2023-02-02 | End: 2023-02-05

## 2023-02-02 RX ORDER — FUROSEMIDE 20 MG/1
20 TABLET ORAL DAILY
Status: DISCONTINUED | OUTPATIENT
Start: 2023-02-03 | End: 2023-02-10 | Stop reason: HOSPADM

## 2023-02-02 RX ORDER — SODIUM CHLORIDE 9 MG/ML
INJECTION, SOLUTION INTRAVENOUS PRN
Status: DISCONTINUED | OUTPATIENT
Start: 2023-02-02 | End: 2023-02-10 | Stop reason: HOSPADM

## 2023-02-02 RX ORDER — ALBUTEROL SULFATE 90 UG/1
2 AEROSOL, METERED RESPIRATORY (INHALATION) EVERY 6 HOURS PRN
Status: DISCONTINUED | OUTPATIENT
Start: 2023-02-02 | End: 2023-02-10 | Stop reason: HOSPADM

## 2023-02-02 RX ORDER — MORPHINE SULFATE 2 MG/ML
1 INJECTION, SOLUTION INTRAMUSCULAR; INTRAVENOUS EVERY 6 HOURS PRN
Status: DISCONTINUED | OUTPATIENT
Start: 2023-02-02 | End: 2023-02-05

## 2023-02-02 RX ADMIN — MORPHINE SULFATE 1 MG: 2 INJECTION, SOLUTION INTRAMUSCULAR; INTRAVENOUS at 21:48

## 2023-02-02 RX ADMIN — TRIMETHOBENZAMIDE HYDROCHLORIDE 200 MG: 100 INJECTION INTRAMUSCULAR at 05:34

## 2023-02-02 RX ADMIN — LORAZEPAM 1 MG: 2 INJECTION INTRAMUSCULAR; INTRAVENOUS at 03:27

## 2023-02-02 RX ADMIN — HYDROMORPHONE HYDROCHLORIDE 0.5 MG: 1 INJECTION, SOLUTION INTRAMUSCULAR; INTRAVENOUS; SUBCUTANEOUS at 06:12

## 2023-02-02 RX ADMIN — IOPAMIDOL 80 ML: 755 INJECTION, SOLUTION INTRAVENOUS at 00:57

## 2023-02-02 RX ADMIN — PHYTONADIONE 5 MG: 5 TABLET ORAL at 13:23

## 2023-02-02 RX ADMIN — FUROSEMIDE 20 MG: 10 INJECTION, SOLUTION INTRAMUSCULAR; INTRAVENOUS at 20:55

## 2023-02-02 RX ADMIN — OXYCODONE 5 MG: 5 TABLET ORAL at 20:43

## 2023-02-02 RX ADMIN — MORPHINE SULFATE 4 MG: 4 INJECTION, SOLUTION INTRAMUSCULAR; INTRAVENOUS at 05:00

## 2023-02-02 RX ADMIN — CEPHALEXIN 500 MG: 500 CAPSULE ORAL at 23:35

## 2023-02-02 RX ADMIN — MAGNESIUM SULFATE HEPTAHYDRATE 1000 MG: 1 INJECTION, SOLUTION INTRAVENOUS at 00:39

## 2023-02-02 RX ADMIN — SODIUM CHLORIDE, PRESERVATIVE FREE 10 ML: 5 INJECTION INTRAVENOUS at 20:55

## 2023-02-02 RX ADMIN — BUMETANIDE 1 MG: 0.25 INJECTION INTRAMUSCULAR; INTRAVENOUS at 03:26

## 2023-02-02 RX ADMIN — CEPHALEXIN 500 MG: 500 CAPSULE ORAL at 19:25

## 2023-02-02 RX ADMIN — MORPHINE SULFATE 1 MG: 2 INJECTION, SOLUTION INTRAMUSCULAR; INTRAVENOUS at 15:18

## 2023-02-02 RX ADMIN — ROSUVASTATIN 10 MG: 10 TABLET, FILM COATED ORAL at 08:45

## 2023-02-02 RX ADMIN — INSULIN GLARGINE 22 UNITS: 100 INJECTION, SOLUTION SUBCUTANEOUS at 20:56

## 2023-02-02 RX ADMIN — MORPHINE SULFATE 4 MG: 4 INJECTION, SOLUTION INTRAMUSCULAR; INTRAVENOUS at 08:56

## 2023-02-02 RX ADMIN — HYDROXYZINE HYDROCHLORIDE 25 MG: 50 INJECTION, SOLUTION INTRAMUSCULAR at 20:45

## 2023-02-02 RX ADMIN — POTASSIUM CHLORIDE 40 MEQ: 1500 TABLET, EXTENDED RELEASE ORAL at 21:09

## 2023-02-02 RX ADMIN — ONDANSETRON 4 MG: 2 INJECTION INTRAMUSCULAR; INTRAVENOUS at 03:26

## 2023-02-02 RX ADMIN — SODIUM CHLORIDE, PRESERVATIVE FREE 10 ML: 5 INJECTION INTRAVENOUS at 08:31

## 2023-02-02 ASSESSMENT — PAIN SCALES - GENERAL
PAINLEVEL_OUTOF10: 0
PAINLEVEL_OUTOF10: 10
PAINLEVEL_OUTOF10: 9
PAINLEVEL_OUTOF10: 7
PAINLEVEL_OUTOF10: 10
PAINLEVEL_OUTOF10: 8
PAINLEVEL_OUTOF10: 8
PAINLEVEL_OUTOF10: 10

## 2023-02-02 ASSESSMENT — PAIN DESCRIPTION - LOCATION
LOCATION: HIP

## 2023-02-02 ASSESSMENT — PAIN - FUNCTIONAL ASSESSMENT
PAIN_FUNCTIONAL_ASSESSMENT: INTOLERABLE, UNABLE TO DO ANY ACTIVE OR PASSIVE ACTIVITIES
PAIN_FUNCTIONAL_ASSESSMENT: PREVENTS OR INTERFERES SOME ACTIVE ACTIVITIES AND ADLS
PAIN_FUNCTIONAL_ASSESSMENT: PREVENTS OR INTERFERES SOME ACTIVE ACTIVITIES AND ADLS
PAIN_FUNCTIONAL_ASSESSMENT: NONE - DENIES PAIN
PAIN_FUNCTIONAL_ASSESSMENT: PREVENTS OR INTERFERES WITH MANY ACTIVE NOT PASSIVE ACTIVITIES
PAIN_FUNCTIONAL_ASSESSMENT: INTOLERABLE, UNABLE TO DO ANY ACTIVE OR PASSIVE ACTIVITIES

## 2023-02-02 ASSESSMENT — ENCOUNTER SYMPTOMS
ANAL BLEEDING: 0
CONSTIPATION: 1
DIARRHEA: 0
NAUSEA: 1
SHORTNESS OF BREATH: 0
CHEST TIGHTNESS: 0
COUGH: 0
BLOOD IN STOOL: 0
ABDOMINAL DISTENTION: 0
WHEEZING: 0
ABDOMINAL PAIN: 1

## 2023-02-02 ASSESSMENT — PAIN DESCRIPTION - DESCRIPTORS
DESCRIPTORS: ACHING;DISCOMFORT
DESCRIPTORS: SHARP;ACHING
DESCRIPTORS: ACHING
DESCRIPTORS: SHOOTING;SHARP
DESCRIPTORS: ACHING;SHARP
DESCRIPTORS: ACHING;SHARP
DESCRIPTORS: SHARP;SHOOTING
DESCRIPTORS: SHOOTING;SHARP

## 2023-02-02 ASSESSMENT — PAIN DESCRIPTION - ORIENTATION
ORIENTATION: RIGHT

## 2023-02-02 ASSESSMENT — PAIN SCALES - WONG BAKER: WONGBAKER_NUMERICALRESPONSE: 0

## 2023-02-02 NOTE — DISCHARGE SUMMARY
Hospital Medicine Discharge Summary      Patient Identification:   Jose R Davis   : 1950  MRN: [id number]   Account: [de-identified]      Patient's PCP: Yanira Bains MD    Admit Date: 2023     Discharge Date: 2023      Admitting Physician: Digna Vallejo MD     Discharging Nurse Practitioner: Bonnie Pacheco, APRN - CNP     Discharge Diagnoses with Assessment/Plan:    Acute PE/DVT:   CTA chest: Filling defects in the right lower lobe pulmonary arterial branches consistent with acute pulmonary emboli. No evidence of right ventricular strain. US Duplex + for DVT Areas of abnormal echogenicity in the right posterior tibial vein, left greater saphenous, left peroneal and left anterior tibial veins suspicious for thrombi. Likely due to reduced mobility, walks few feet at home but since COVID, less active. S/p IV Heparin drip transitioned to DOAC -> discharged with Xarelto starter pack. Echo showed no acute issues (see # 11). F/u cardiologist within 1 week. Left facial pain and swelling, improving -> ?possible dental abscess? : Patient reports that she has having 10/10 left-sided facial pain her mouth. Unable to tell me if from top or bottom teeth. Reports that she has been having intermittent pains in her mouth for the past 3 to 4 days but did not think anything seriously of it. States she feels like left-sided her face is more swollen than the right. Was slightly warm to touch. Received IV Unasyn -. -> discharge home with Augmentin x 10 days per ENT  CT facial bones negative for dental abscess however does note multiple metallic dental fillings. ENT consulted for further evaluation  F/u with Dentist within 1 week  Hemoptysis,   Patient reports intermittent blood tinged mucous coming from trach. States that it's clear green at times and then intermittently has some blood tinge to it.  Patient also reports she has not had humidified oxygen through her trach mask d/t malfunction of humidifier. Discussed with ENT who does not believe patient needs pulmonology consult at this time. Believes bleeding may be from the mucosa being dried and trach care. Okay to resume [address], but monitor for recurrence/worsened hemoptysis. Case was discussed with pulmonology, chart reviewed, case discussed. Pulmonology also tinged mucous from trach care, limited humidified O2 on admission. No further recs at this time. Consider CT chest and pulmonology consult if continues  Repeat CBC within 1 week. 1/28: Denies blood tinged mucous   1/29: Continues to deny bloody tinged mucous    Bilateral LE edema:   Likely due to DVTs. PT/OT > recommended HH > HH orders written for PT/OT/Skilled nursing   Constipation, resolved   KUB is notable for mild gaseous distention of the stomach and scattered gas in the colon, moderate amount of fecal material in the colon, no abnormally dilated bowel loops. -- ?gastroparesis  Bowel regimen: Colace, senna, MiraLAX.  1/28: Reports have 3 very large BM's. Hypotension (POA), resolved  BP low in ER 79/57 in ER and HR up 128 -- most likely due to hypovolemia with decreased po, lasix use and with BP meds --> improved with IVF. Will hold further IVF with BLE edema issues -- cont home toprol with parameters and monitor closely. Holding lisinopril d/t relative hypotension -- consider further IVF if recurs-- ?due to adrenal insufficiency with recent tapering of steroids over last couple months, however patient reports she has been weaned off completely -- may need stress dose steroids if BP worsens again. -- less likely due to PE as no RV strain on CT - Echo okay. 7.   Sinus tachycardia, improving  Chronic component also per pt -  in ER -- improved with IVF - likely due to combo of hypovolemia and PE  Continue Toprol > increased to 50 mg   Monitor tele > discontinued at discharge  8.    Leukocytosis, resolved  12.6 initially in ER with repeat 4 hrs later WNL-- afeb - monitor with above - Suspect likely reactive to dehydration. 9.   Acute on chronic macrocytic anemia   ? ?dry on initial presentation as hgb 12.1 and s/p IVF down to 9.9 -- prior in 8/2022 was 13 but in 6/2022 was 9-10's  Transfuse for hemoglobin less than 7 g/dL or hemodynamic instability. Iron studies notable for ferritin 790, iron 46, iron sat 25, folate normal, TIBC 183, vitamin B12 1880  Repeat CBC within 1 week and f/u with PCP within 1 week. 10.   Essential HTN, controlled:   BP controlled today but still having episodes of relative hypotension in to low 101O systolically. Continue Troprol. D/c Lisinopril and lasix. BP medications with parameters. F/u with PCP within 1 week for reevaluation of BP meds. 11.   CAD:   with history of stent -- ASA, BB, statin - trop min elevated on admission - trend - EKG no acute changes   12. IDDMII:   HbA1c 5.5 (1/24/23). On Lantus 44u nightly with Humalog at home, however this was for when she was on steroids. Decrease lantus to 22 units nightly which is 1/2 home dose and monitor BS -> discharged home with this dose  Continue medium dose SSI  Accu-Cheks  Hypoglycemia protocol  Diabetic diet  13. Chronic respiratory failure with tracheostomy, tracheal stenosis -- at baseline -- monitor with #1   Following with ENT Dr. Mike Estrada and plan for surgery 2/9/23 but may need to be delayed. Cardiac clearance appointment rescheduled for 2/2/2023. 14.   Hx COVID s/p tracheostomy: 12/2021-04/2022  15. Elevated troponin   Initially 0.021 -> 0.019 in ER -- likely due to PE -- no acute EKG changes  Echo 1/23/23: Notable for EF 60-65%, no WMA, G1DD, RVSP 40 mmHg - last echo 3/2022 with EF 60%, no WMA - stress 6/2022 non-diagnostic for ischemia, normal EF. 16.   Hyperlipidemia:   Statin  17. Obesity:   BMI 38.41 kg/m². Discussed and educated on lifestyle modifications.       The patient was seen and examined on day of discharge and this discharge summary is in conjunction with any daily progress note from day of discharge. Hospital Course:   Per HPI documented 1/21/2023: \"79 yo F with history of DM, HTN, COVID 12/2020 s/p tracheostomy 04/2022 follows with Dr. Pradeep Spangler, presented to [facility] ED for complaints of abdominal bloating with nausea/vomiting. Ed: afebrile, hemodynamically stable tachycardic, saturating well on 0.5 L trach mask. Labs notable for Trop 0.021 then 0.019 on repeat, leukocytosis 12.6. Ddimer elevation ~5000. CTA chest: Filling defects in the right lower lobe pulmonary arterial branches consistent with acute pulmonary emboli. No evidence of right ventricular strain. CXR WNL. KUB with no acute process. US Duplex + for distal DVT Areas of abnormal echogenicity in the right posterior tibial vein, left greater saphenous, left peroneal and left anterior tibial veins suspicious for thrombi. Started on heparin gtt. Given reglan ang GI cocktal. Had a BM. Admitted for further management. On eval, per patient and her  present at bedside, reports has been having issues with constipation and bloating last couple of days which got worse. Woke up with worsened abdominal bloating/distention with nausea and vomiting. Had a large BM overnight, feels slightly better since then. No subjective fever, chills, dizziness, diarrhea. Dyspnea was in the setting of weaning down her oxygen needs following a weaning protcol. No chest pain, palpitations, syncope. Follows with Dr. Pradeep Spangler. Plan for weaning off trach with a procedure on 02/09/2023. \"     1/25/23: Resumed care of patient today. She is sitting up in bedside recliner, nontoxic in appearance, no apparent distress. Remains afebrile, satting 100% on room air, hemodynamically stable. Patient stating that she is still having an intermittent cough. States that at times it is blood-tinged but it is more clear today. She did remove her inner cannula and it was noted to have dried blood on it.   Patient states she has not cleaned her cannula since yesterday. We will clean cannula, monitor overnight. Consider switching back to heparin. May need to consult Dr. Willian Moser and ENT for further recommendations. Patient also stating she has not had a bowel movement yet in 5 days. States she is passing lots of gas but feels nauseous and mildly bloated today. Will obtain repeat KUB and start her on bowel regimen. Keep patient overnight for closer monitoring of minimal blood in trach tube. Additionally patient has a PICC line has been in place since August of last year. Through the notes it appears that patient is very hard stick gets weekly lab draws and per note from Dr. Willian Moser in 8/2022 wanted patient to have her PICC line given that she would need recurrent procedures. X-ray showing the PICC line is not an appropriate place, will need to be removed and replaced for lab draws. We will keep her on IV fluids at [facility] rate to keep PICC line open while in hospital.     1/26/23: Afebrile, hemodynamically stable. Patient reports that she has having 10/10 left-sided facial pain her mouth. Unable to tell me if from top or bottom teeth. Reports that she has been having intermittent pains in her mouth for the past 3 to 4 days but did not think anything seriously of it. States she feels like left-sided her face is more swollen than the right. Was slightly warm to touch. Will start Unasyn and get CT of the face. ENT consulted. Patient reports intermittent blood tinged mucous coming from trach. States that it's clear green at times and then intermittently has some blood tinge to it. Patient also reports she has not had humidified oxygen through her trach mask d/t malfunction of humidifier. Discussed with ENT who does not believe patient needs pulmonology consult at this time. Believes bleeding may be from the mucosa being dried and trach care. Okay to resume [facility], but monitor for recurrence/worsened hemoptysis. 1/27/23: Afebrile, hemodynamically stable. No acute events overnight. Patient stating she feels better this evening. Currently rates her pain as a 7 out of 10. States that Nickola Block does seem to help. Reports that her face is not swollen. On exam her face does not appear as erythematous or warm. We will continue IV antibiotics. Patient stating her sputum is more clear today. Does report some intermittent blood-tinged sputum but no overt blood noted from tracheostomy itself or on gauze. H&H stable. Still reports that having a bowel movement. States she is passing lots of gas. He does have active bowel sounds. We will add lactulose and as needed enema to bowel regimen. 1/28/23: Afebrile, hemodynamically stable. Patient stating she is doing better today. Reporting no blood-tinged mucus from her trach today. States she is feeling better. Still reporting 6/10 left-sided facial pain but states it is better than days prior. No erythema or warmth appreciated on left side of her cheek. Patient reports having 3 extremely large bowel movements today and is no longer having any bloating. Does report some nausea though. Did have 1 hypotensive pressure however repeat manual BP 92/60, will continue to hold Lasix and lisinopril at this time. Hemoglobin dropped to 8.2 however per night nurse patient had continuous IV fluids running all night. We will discontinue repeat H&H this afternoon. Keep patient overnight for closer monitoring. Awaiting ENT recommendations on IV antibiotics. Likely switch over to p.o. Augmentin tomorrow morning and discharge. 1/29/23: Afebrile, hemodynamically stable, satting  95% on trach mask. No acute events overnight. Patient stating she is feeling better today. Currently rates her dental pain a 2/10. Reports that she is still passing gas and had another BM overnight.  She denies chest pains, shortness of breath at rest, dyspnea on exertion, abdominal pain, nausea, vomiting, diarrhea, fever, chills. She denies any blood tinged mucous or hemoptysis. Patient found to have DVT's and PE. She has been receiving Xarelto for this. Patient to be discharged with Xarelto and instructed to follow up with her cardiologist within 1 week. During admission, patient was found to intermittent hypotensive episodes and relative hypotension. Her blood pressure medications have been adjusted. Patient was evaluated by ENT services during this admission for dental pain and blood tinged mucous. Patient did not initially receive humidified oxygen and since her blood tinged mucous has resolved. ENT stated her blood tinged mucous was due to lack of humidified O2 and trach care. For her dental pain, they recommended she be discharged with antibiotics and to follow up outpatient. Patient instructed to take Augmentin for the next 10 days and to follow up with her dentist within 1 week. Patient instructed to continue to take her bowel regimen medications to help prevent her constipation. Patient also instructed to start taking Lantus 22 units nightly as her blood glucoses have remained well controlled with current regimen as noted in A&P above. Patient instructed to follow up with Dr. Luly Fletcher in 2 weeks to reschedule her surgery and for further tracheostomy care. She was instructed to get repeat CBC collected within 1 week and to follow up with her PCP within 1 week. During this admission, patient had her PICC line exchanged as patient has had a chronic PICC line in place since August of 2022 at recommendation of Dr. Luly Fletcher. I did discuss with ENT to see if they would still like patient to keep her newly placed PICC at time of discharge and they were agreeable. New  orders were written for patient for skilled nursing, PT/OT. Patient was thoroughly educated on PICC line care and was set up with [facility] infusion center. Prior to discharge patient was provided the opportunity to ask questions.  At this time,  patient is stable for discharge. Exam:     Vitals:  Vitals:    01/29/23 0645 01/29/23 0730 01/29/23 0947 01/29/23 1027   BP: (!) 111/56  95/76    Pulse:       Resp:  20     Temp:       TempSrc:       SpO2: 99%   98%   Weight:       Height:         Weight: Weight: 210 lb (95.3 kg)     24 hour intake/output:No intake or output data in the 24 hours ending 02/02/23 0018      General appearance: No apparent distress, appears older than stated age and cooperative. Chronically ill in appearance. Nonverbal due to not having Passy-Janeth valve, but can communicate through writing and mouthing words. HEENT: Pupils equal, round, and reactive to light. Conjunctivae/corneas clear. Poor dentition, multiple caps and fillings in teeth. No mild left facial swelling, warmth. No abscesses appreciated on inner mouth and gum line. No cervical lymphadenopathy. Neck: Supple, with full range of motion. No jugular venous distention. Trachea midline with tracheostomy in place. . Inner cannula removed no scant crusts or blood appreciated, no significant pink tinged mucous or bright red blood appreciated. Respiratory:  Normal respiratory effort. Clear to auscultation, bilaterally without Rales/Wheezes/Rhonchi. Cardiovascular: Regular rate and regular rhythm with normal S1/S2 without murmurs, rubs or gallops. Abdomen: Soft, non-tender, non-distended with normal bowel sounds. Musculoskeletal: passive and active ROM x 4 extremities. Skin: Skin color, texture, turgor normal.  No rashes or lesions. Neurologic:  Neurovascularly intact without any focal sensory/motor deficits. Cranial nerves: II-XII intact, grossly non-focal.  Psychiatric: Alert and oriented, thought content appropriate, normal insight  Capillary Refill: Brisk,< 3 seconds   Peripheral Pulses: +2 palpable, equal bilaterally       Labs:  For convenience and continuity at follow-up the following most recent labs are provided:      CBC:    Lab Results   Component Value Date/Time    WBC 6.4 02/01/2023 10:50 PM    HGB 8.8 02/01/2023 10:50 PM    HCT 26.7 02/01/2023 10:50 PM     02/01/2023 10:50 PM       Renal:    Lab Results   Component Value Date/Time     02/01/2023 10:50 PM    K 3.8 02/01/2023 10:50 PM    K 4.1 01/28/2023 05:20 AM     02/01/2023 10:50 PM    CO2 23 02/01/2023 10:50 PM    BUN 6 02/01/2023 10:50 PM    CREATININE 0.7 02/01/2023 10:50 PM    CALCIUM 8.7 02/01/2023 10:50 PM    PHOS 3.1 04/06/2022 05:00 AM       Cardiac:   Recent Labs     02/01/23  2250   TROPONINT 0.027*       Significant Diagnostic Studies    Radiology:   CT FACIAL BONES WO CONTRAST   Final Result      No evidence of dental abscess. Final report electronically signed by Dr. Roger Daniels on 1/26/2023 7:17 PM      XR ABDOMEN (KUB) (SINGLE AP VIEW)   Final Result   Constipation. No acute findings. **This report has been created using voice recognition software. It may contain minor errors which are inherent in voice recognition technology. **         Final report electronically signed by Dr. Марина Montoya on 1/25/2023 4:47 PM      XR CHEST PORTABLE   Final Result   Interval insertion of right-sided PICC line with tip at the cavoatrial junction. **This report has been created using voice recognition software. It may contain minor errors which are inherent in voice recognition technology. **      Final report electronically signed by Dr. Greg Chairez MD on 1/25/2023 4:55 PM      XR CHEST PORTABLE   Final Result   1. Poor inflation of lungs. Tracheostomy tube. Moderate cardiomegaly. 2. No infiltrates or effusions are seen. 3. Left arm PICC line with catheter tip that appears to be in the left subclavian vein. **This report has been created using voice recognition software. It may contain minor errors which are inherent in voice recognition technology. **      Final report electronically signed by Dr. Марина Montoya on 1/25/2023 10:11 AM VL DUP LOWER EXTREMITY VENOUS BILATERAL   Final Result      1. Areas of abnormal echogenicity in the right posterior tibial vein, left greater saphenous, left peroneal and left anterior tibial veins suspicious for thrombi. **This report has been created using voice recognition software. It may contain minor errors which are inherent in voice recognition technology. **      Final report electronically signed by DR Nicholas Boland on 1/21/2023 11:28 AM      CTA CHEST W WO CONTRAST   Final Result       1. Filling defects in the right lower lobe pulmonary arterial branches consistent with acute pulmonary emboli. No evidence of right ventricular strain. 2.. Atherosclerotic calcification in the thoracic aorta. 3. Borderline cardiomegaly. Calcification versus stent placement in the left anterior descending coronary artery. 4. Thickening off the interstitial lung markings. 5. Thoracic spondylosis. 6. Sclerotic density in the left eighth rib posteriorly. 7. Small hiatal hernia. 9. Results called to Steve Luna   at 950 AM               **This report has been created using voice recognition software. It may contain minor errors which are inherent in voice recognition technology. **      Final report electronically signed by DR Nicholas Boland on 1/21/2023 9:57 AM      XR CHEST (2 VW)   Final Result   Impression:   No acute cardiopulmonary. This document has been electronically signed by: Raelyn Sheerer. Marylene Dunk on    01/21/2023 04:56 AM      XR ABDOMEN (KUB) (SINGLE AP VIEW)   Final Result   Impression: No acute process. This document has been electronically signed by: Raelyn Sheerer. Marylene Dunk on    01/21/2023 04:58 AM             Consults:     IP CONSULT TO SOCIAL WORK  IP CONSULT TO OTOLARYNGOLOGY  IP CONSULT TO HOME CARE NEEDS    Disposition:    [x] Home       [] TCU       [] Rehab       [] Psych       [] SNF       [] [place]       [] Other-    Condition at Discharge: Stable    Code Status:  Prior     Pending tests at discharge: None    Patient Instructions:    Discharge lab work: CBC with Diff  Activity: activity as tolerated  Diet: No diet orders on file      Follow-up visits:   Noe Ho MD  [address] [facility]  [phone]    Follow up on 1/30/2023  Follow up appointment January 30, 2023 at 4:30pm.    [place] [facility]  [phone]        Hermelinda Galindo MD  [address] [place] [address]  [phone]    Follow up in 1 week(s)  Office will contact patient for a 1 week follow up appointment to get clearance for surgery. Rema Ross MD  [address] [facility] [address] [facility] [phone]    Follow up in 2 week(s)  Post-hopsital follow up and follow up on surgery         Discharge Medications:        Medication List        START taking these medications      amoxicillin-clavulanate 875-125 MG per tablet  Commonly known as: AUGMENTIN  Take 1 tablet by mouth 2 times daily for 10 days     ondansetron 4 MG disintegrating tablet  Commonly known as: ZOFRAN-ODT  Take 2 tablets by mouth every 8 hours as needed for Nausea or Vomiting     rivaroxaban 15 & 20 MG Starter Pack  Take as directed on package. senna 8.6 MG tablet  Commonly known as: SENOKOT  Take 1 tablet by mouth nightly     sodium chloride flush 0.9 % injection  Infuse 5-40 mLs intravenously 2 times daily Flush 10 mL twice daily. CHANGE how you take these medications      Lantus SoloStar 100 UNIT/ML injection pen  Generic drug: insulin glargine  Inject 22 Units into the skin nightly  What changed: how much to take     NovoLOG FlexPen 100 UNIT/ML injection pen  Generic drug: insulin aspart  Inject 20-25 Units into the skin in the morning and 20-25 Units at noon and 20-25 Units in the evening. Inject before meals.   What changed:   how much to take  when to take this  additional instructions  Another medication with the same name was removed. Continue taking this medication, and follow the directions you see here. CONTINUE taking these medications      * albuterol 0.63 MG/3ML nebulizer solution  Commonly known as: ACCUNEB     * albuterol sulfate  (90 Base) MCG/ACT inhaler  Commonly known as: PROVENTIL;VENTOLIN;PROAIR  Inhale 2 puffs into the lungs every 6 hours as needed for Wheezing     aspirin 81 MG chewable tablet     Cepacol 15-2.3 MG Lozg  Generic drug: Benzocaine-Menthol  Take 1 lozenge by mouth every 3 hours as needed (sore throat, pain)     glucagon 1 MG injection  Inject 1 mg into the muscle See Admin Instructions Follow package directions for low blood sugar. guaiFENesin 400 MG tablet     Insulin Pen Needle 31G X 6 MM Misc  1 each by Does not apply route daily     lidocaine 4 % external solution  Commonly known as: XYLOCAINE     metoprolol succinate 25 MG extended release tablet  Commonly known as: TOPROL XL  Take 1 tablet by mouth daily  Notes to patient: Check your blood pressure and write it down to bring to your next appointment     MUCINEX ALLERGY PO     nitroGLYCERIN 0.4 MG SL tablet  Commonly known as: NITROSTAT     NONFORMULARY     OXYGEN  Inhale 2 L/min into the lungs continuous prn (during activities such as walking)     rosuvastatin 10 MG tablet  Commonly known as: CRESTOR     tiZANidine 4 MG tablet  Commonly known as: ZANAFLEX     VITAMIN D3 PO           * This list has 2 medication(s) that are the same as other medications prescribed for you. Read the directions carefully, and ask your doctor or other care provider to review them with you.                 STOP taking these medications      furosemide 40 MG tablet  Commonly known as: LASIX     insulin lispro (1 Unit Dial) 100 UNIT/ML Sopn  Commonly known as: HumaLOG KwikPen     insulin  UNIT/ML injection vial  Commonly known as: HumuLIN N;NovoLIN N     lisinopril 10 MG tablet  Commonly known as: PRINIVIL;ZESTRIL     predniSONE 10 MG tablet  Commonly known as: Severiano Spark your doctor about these medications      HYDROcodone-acetaminophen 5-325 MG per tablet  Commonly known as: NORCO  Take 1 tablet by mouth every 6 hours as needed for Pain for up to 3 days. Max Daily Amount: 4 tablets  Ask about: Should I take this medication? Where to Get Your Medications        These medications were sent to [address] [place] [address], [address] [facility] [address]      Phone: [phone]   amoxicillin-clavulanate 875-125 MG per tablet  Lantus SoloStar 100 UNIT/ML injection pen  ondansetron 4 MG disintegrating tablet  rivaroxaban 15 & 20 MG Starter Pack  senna 8.6 MG tablet       You can get these medications from any pharmacy    Bring a paper prescription for each of these medications  HYDROcodone-acetaminophen 5-325 MG per tablet  sodium chloride flush 0.9 % injection         Time Spent on discharge is more than 1.5 hours in the examination, evaluation, counseling and review of medications and discharge plan. Signed: Thank you Donya Manriquez MD for the opportunity to be involved in this patient's care.     Electronically signed by WYATT Kruse CNP on 2/2/2023 at 12:18 AM

## 2023-02-02 NOTE — PLAN OF CARE
Problem: Discharge Planning  Goal: Discharge to home or other facility with appropriate resources  2/2/2023 1115 by KIARRA Banegas  Outcome: Progressing       Consult received. Please see SW note dated 2/02.

## 2023-02-02 NOTE — CONSULTS
Orthopaedic Consult  Patient:  Allie Segura  YOB: 1950  MRN: [id number]     Acct: [de-identified]    PCP: Johanna Rayo MD  Date of Admission: 2023  Date of Service: Pt seen/examined on 2023     Chief Complaint: right femur fx  History Of Present Illness: 81 y.o. female who sustained a mechanical fall while in the ED pending admission for BLE swelling, n/v. Immediate right sided hip pain, imaging revealed right femur fracture, no other msk complaints at this time. Denies hitting head, no loc. Pain at the right groin, worsened with any ROM or weight bearing of the hip, relieved by immobilization. No antecedent hip pain. Recently admitted - for bilateral DVT and PE, started on Xarelto.    Xarelto, last taken last night  INR 3.8 yesterday   Trach dependent since 2022  Hx CAD, stent, DM,   Baseline ambulates minimally with walker    Past Medical History:        Diagnosis Date    Arthritis     Coronary artery disease     COVID     Hyperlipidemia     Primary hypertension     Type 2 diabetes mellitus ([facility] [address] 2018       Past Surgical History:        Procedure Laterality Date    CARDIAC SURGERY      CATARACT REMOVAL Bilateral      SECTION      3 times    CORONARY ANGIOPLASTY WITH STENT PLACEMENT      stenting twice    EYE SURGERY      HIATAL HERNIA REPAIR      late     HYSTERECTOMY, TOTAL ABDOMINAL (CERVIX REMOVED)      in     LARYNGOSCOPY N/A 3/22/2022    SUSPENSON LARYNGOSCOPY WITH JET VENT, DILATION performed by Eva Hebert MD at [address] N/A 3/28/2022    SUSPENSION MICROLARYNGOSCOPY WITH BALLOON DILATION AND JET VENT, KENALOG INJECTION performed by Eva Hebert MD at [address] N/A 2022    Suspension Laryngoscopy  Lateral of Right True Vocal Cord, With Steroid Injection of Larynx And Tracheostomy Tube Change, debridement performed by Niraj Collado MD at [address] N/A 8/10/2022    TRANSORAL LARYNGOPLASTY WITH LEFT PARTIAL ARYTENODECTOMY AND POSS LATERALIZATION, ADVANCEMENT FLAP RECONSTRUCTION WITH JET VENT,THERAPUTIC BRONCHOSCOPY WITH DEBRIEDMENT,WITH BALLOON DILITATION performed by Clover Valentine MD at [address] N/A 8/31/2022    Laryngoscopy with Dilation Debridement  Bronchoscopy with Dilation & Resection of Obstructing Tissues Transoral Laryngoplasty Left True Vocal Fold Lateralization left partial aryteniodectomy performed by Clover Valentine MD at [address]      [place] [address] 4/1/2022    TRACHEOTOMY TUBE REPLACEMENT performed by Antonio Duenas MD at [address] [facility] Medications:   Prior to Admission medications    Medication Sig Start Date End Date Taking? Authorizing Provider   ondansetron (ZOFRAN-ODT) 4 MG disintegrating tablet Take 2 tablets by mouth every 8 hours as needed for Nausea or Vomiting 1/29/23 2/5/23  Blease Bacca, APRN - CNP   senna (SENOKOT) 8.6 MG tablet Take 1 tablet by mouth nightly 1/29/23 2/28/23  Blease Bacca, APRN - CNP   rivaroxaban 15 & 20 MG Starter Pack Take as directed on package. 1/29/23   Blease Bacca, APRN - CNP   insulin glargine (LANTUS SOLOSTAR) 100 UNIT/ML injection pen Inject 22 Units into the skin nightly 1/29/23   Blease Bacca, APRN - CNP   amoxicillin-clavulanate (AUGMENTIN) 875-125 MG per tablet Take 1 tablet by mouth 2 times daily for 10 days 1/29/23 2/8/23  Blease Bacca, APRN - CNP   sodium chloride flush 0.9 % injection Infuse 5-40 mLs intravenously 2 times daily Flush 10 mL twice daily.  1/29/23   Blease Bacca, APRN - CNP   tiZANidine (ZANAFLEX) 4 MG tablet  11/20/22   Historical Provider, MD   Benzocaine-Menthol (CEPACOL) 15-2.3 MG LOZG Take 1 lozenge by mouth every 3 hours as needed (sore throat, pain) 9/1/22   MICHAEL Jose   Fexofenadine HCl [facility] PO) Take 1 tablet by mouth 2 times daily Cuts in half to swallow easier    Historical Provider, MD   insulin aspart (NOVOLOG FLEXPEN) 100 UNIT/ML injection pen Inject 20-25 Units into the skin in the morning and 20-25 Units at noon and 20-25 Units in the evening. Inject before meals. Patient taking differently: Inject 10 Units into the skin 2 times daily (before meals) 10 units with sliding scale at lunch and dinner 8/14/22 11/22/22  Mackenzie Guzman DO   glucagon 1 MG injection Inject 1 mg into the muscle See Admin Instructions Follow package directions for low blood sugar. 8/13/22   Mackenzie Guzman DO   Insulin Pen Needle 31G X 6 MM MISC 1 each by Does not apply route daily 8/13/22   Mackenzie Guzman DO   insulin lispro, 1 Unit Dial, (HUMALOG KWIKPEN) 100 UNIT/ML SOPN Inject 20-25 Units into the skin in the morning and 20-25 Units at noon and 20-25 Units in the evening. Inject before meals. 8/13/22 11/22/22  Mackenzie Guzman DO   albuterol (ACCUNEB) 0.63 MG/3ML nebulizer solution Take 1 ampule by nebulization every 6 hours as needed for Wheezing    Historical Provider, MD   lidocaine (XYLOCAINE) 4 % external solution Apply 1 Dose topically as needed for Pain Apply topically as needed.     Historical Provider, MD   Cholecalciferol (VITAMIN D3 PO) Take by mouth daily 4000 units  Patient not taking: Reported on 1/21/2023    Historical Provider, MD   NONFORMULARY daily resveratrol    Historical Provider, MD   guaiFENesin 400 MG tablet Take 400 mg by mouth 2 times daily as needed for Cough    Historical Provider, MD   insulin NPH (HUMULIN N;NOVOLIN N) 100 UNIT/ML injection vial Inject into the skin 2 times daily (before meals) Takes BID on the [facility]  11/22/22  Historical Provider, MD   metoprolol succinate (TOPROL XL) 25 MG extended release tablet Take 1 tablet by mouth daily 4/8/22   Eufemia Pérez MD   albuterol sulfate  (90 Base) MCG/ACT inhaler Inhale 2 puffs into the lungs every 6 hours as needed for Wheezing 1/27/22   Dee Espino MD   OXYGEN Inhale 2 L/min into the lungs continuous prn (during activities such as walking) 1/27/22   Alisha Quiroz MD   rosuvastatin (CRESTOR) 10 MG tablet Take 10 mg by mouth daily    Historical Provider, MD   nitroGLYCERIN (NITROSTAT) 0.4 MG SL tablet Place 0.4 mg under the tongue every 5 minutes as needed for Chest pain up to max of 3 total doses. If no relief after 1 dose, call 911.      Historical Provider, MD   aspirin 81 MG chewable tablet Take 81 mg by mouth daily    Historical Provider, MD       Current Hospital Medications:    Current Facility-Administered Medications:     albuterol sulfate HFA (PROVENTIL;VENTOLIN;PROAIR) 108 (90 Base) MCG/ACT inhaler 2 puff, 2 puff, Inhalation, Q6H PRN, WYATT Slaughter - CNP    aspirin chewable tablet 81 mg, 81 mg, Oral, Daily, Marcella Montiel APRN - CNP    Menthol lozenge 4.8 mg, 1 lozenge, Oral, Q3H PRN, Marcella Montiel APRN - CNP    guaiFENesin (ROBITUSSIN) 100 MG/5ML liquid 400 mg, 400 mg, Oral, BID PRN, Marcella Montiel APRN - ARIELLE    insulin glargine (LANTUS;BASAGLAR) injection pen 22 Units, 22 Units, SubCUTAneous, Nightly, Marcella Montiel APRN - ARIELLE    metoprolol succinate (TOPROL XL) extended release tablet 25 mg, 25 mg, Oral, Daily, Marcella Montiel, APRN - CNP    rosuvastatin (CRESTOR) tablet 10 mg, 10 mg, Oral, Daily, Marcella Montiel APRN - CNP    senna (SENOKOT) tablet 8.6 mg, 1 tablet, Oral, Nightly, Marcella Montiel, APRN - CNP    tiZANidine (ZANAFLEX) tablet 4 mg, 4 mg, Oral, Q8H PRN, Marcella Montiel, APRN - CNP    sodium chloride flush 0.9 % injection 5-40 mL, 5-40 mL, IntraVENous, 2 times per day, Rosser Shivers, APRN - CNP    sodium chloride flush 0.9 % injection 5-40 mL, 5-40 mL, IntraVENous, PRN, Marcella Rasmussenvers, APRN - CNP    0.9 % sodium chloride infusion, , IntraVENous, PRN, Marcella Shivers, APRN - CNP    polyethylene glycol (GLYCOLAX) packet 17 g, 17 g, Oral, Daily PRN, WYATT Slaughter - CNP    acetaminophen (TYLENOL) tablet 650 mg, 650 mg, Oral, Q6H PRN **OR** acetaminophen (TYLENOL) suppository 650 mg, 650 mg, Rectal, Q6H PRN, WYATT Milner CNP    enoxaparin (LOVENOX) injection 40 mg, 40 mg, SubCUTAneous, Daily, WYATT Milner CNP    potassium chloride (KLOR-CON M) extended release tablet 40 mEq, 40 mEq, Oral, PRN **OR** potassium bicarb-citric acid (EFFER-K) effervescent tablet 40 mEq, 40 mEq, Oral, PRN **OR** potassium chloride 10 mEq/100 mL IVPB (Peripheral Line), 10 mEq, IntraVENous, PRN, WYATT Milner CNP    magnesium sulfate 2000 mg in 50 mL IVPB premix, 2,000 mg, IntraVENous, PRN, WYATT Milner CNP    trimethobenzamide [place]) injection 200 mg, 200 mg, IntraMUSCular, Q6H PRN, WYATT Milner CNP, 200 mg at 02/02/23 0534    insulin lispro (HUMALOG) injection vial 0-8 Units, 0-8 Units, SubCUTAneous, TID WC, WYATT Milner CNP    insulin lispro (HUMALOG) injection vial 0-4 Units, 0-4 Units, SubCUTAneous, Nightly, WYATT Milner CNP    scopolamine (TRANSDERM-SCOP) transdermal patch 1 patch, 1 patch, TransDERmal, Q72H, WYATT Milner CNP     Allergies:  Metformin and related, Codeine, Meperidine hcl, Sulfa antibiotics, and Sumatriptan  Social History:    Social History     Socioeconomic History    Marital status:      Spouse name: bailee     Number of children: 3    Years of education: Not on file    Highest education level: Not on file   Occupational History    Not on file   Tobacco Use    Smoking status: Never    Smokeless tobacco: Never   Vaping Use    Vaping Use: Never used    Passive vaping exposure: Yes   Substance and Sexual Activity    Alcohol use: Not Currently     Comment: drinks 1 glass of wine cooler or wine about 3 times per year    Drug use: Never    Sexual activity: Not Currently   Other Topics Concern    Not on file   Social History Narrative    Not on file     Social Determinants of Health     Financial Resource Strain: Not on file   Food Insecurity: Not on file   Transportation Needs: Not on file   Physical Activity: Not on file   Stress: Not on file   Social Connections: Not on file   Intimate Partner Violence: Not on file   Housing Stability: Not on file     Family History:        Problem Relation Age of Onset    Parkinson's Disease Father     Lung Cancer Father      Further Family History is noncontributory to this injury. REVIEW OF SYSTEMS:      Review of Systems - General ROS: negative for - chills, fatigue, fever, malaise or night sweats  Psychological ROS: negative  Ophthalmic ROS: negative   ENT ROS: negative for - headaches or sore throat  Hematological and Lymphatic ROS: negative for - bleeding problems or blood clots  Respiratory ROS: trach dependent  Cardiovascular ROS: no chest pain or dyspnea on exertion  Gastrointestinal ROS: negative  Musculoskeletal ROS: See HPI  Neurological ROS: negative for - bowel and bladder control changes, gait disturbance or numbness/tingling  All other systems reviewed and are negative      PHYSICAL EXAM:  /71   Pulse 88   Temp 97.7 °F (36.5 °C) (Oral)   Resp 16   Ht 5' 2\" (1.575 m)   Wt 210 lb (95.3 kg)   LMP  (LMP Unknown)   SpO2 96%   BMI 38.41 kg/m²   GENERAL APPEARANCE: Awake and oriented x4. No acute distress, except appropriate to injury, trach dependent  MOOD AND AFFECT: Calm appropriate to situation  HEAD: normocephalic, atraumatic   EYES: equal and reactive to light, Extraocular movements are normal. Pupils are equal in size. Hematologic/Lymphatic: no lymphadenopathy to upper or lower extremity. MSK   RLE:- atraumatic hip, knee, ankle, no lacerations or lesions. Nontender to palpation asis iliac crest, TTP at greater troch and femoral shaft, nontender medial lateral joint line, calf supple but tenderness to baseline from known dvt, nontender medial lateral mal, midfoot, calc. Able to perform isometric quad fire, gastroc ta ehl intact, pain with IR ER. sensation to light touch intact, distal pulses dopplerable.     Labs:   CBC:   Lab Results   Component Value Date/Time    WBC 6.4 02/01/2023 10:50 PM    RBC 2.75 02/01/2023 10:50 PM     BMP:  Lab Results   Component Value Date/Time    GLUCOSE 138 02/01/2023 10:50 PM    CO2 23 02/01/2023 10:50 PM    BUN 6 02/01/2023 10:50 PM    CREATININE 0.7 02/01/2023 10:50 PM    CALCIUM 8.7 02/01/2023 10:50 PM     PT/INR:   Lab Results   Component Value Date/Time    INR 3.83 02/01/2023 10:50 PM    APTT 53.3 02/01/2023 10:50 PM     Type and Screen: No results found for: LABABO, RH, LABANTI  CRP:   Lab Results   Component Value Date/Time    CRP 7.91 12/20/2021 06:44 PM     ESR: No results found for: SEDRATE  HgBA1c:    Lab Results   Component Value Date/Time    LABA1C 5.5 01/24/2023 05:40 AM       Radiology:   XR:   Comparison: None       Findings:   Acute right femoral intertrochanteric fracture, with subtrochanteric    extension. Normal alignment of the right femoroacetabular joint. Bony demineralization. Normal alignment of the bilateral sacroiliac and    left hip joints. Lumbar spondylosis. Excreted contrast in the urinary bladder. Peripheral arterial vascular calcifications. Impression   Impression:   Acute right femoral intertrochanteric fracture, with subtrochanteric    extension. Radiology report reviewed. ASSESSMENT:  81 y.o. female who sustained a mechanical fall while pending admission from the ED for continued management of bilateral DVTs and PEs, resulting in a right IT femur fracture. The necessity of surgical intervention was discussed at length with the patient. Surgery recommended for anatomic restoration and increased functional outcomes post operatively. The risks, benefits, and alternatives to surgical intervention of R femur fixation were discussed at length. Specifically, nonunion, malunion, fixation failure, joint instability, soreness, infection, damage to surrounding neurovascular structures, necessity of return to the OR, post traumatic arthritis, DVT, blood loss requiring transfusion, and death. Post- operative expectations were addressed as well. The patient expressed understanding, all questions were answered. Patient agreeable to move forward with right femur fixation, pending medical optimization and clearance. PLAN:    -potential OR tomorrow pending INR and medical optimization from consulting services, will follow, NPO midnight for potential clinical improvement  -bedrest   -hospitalist primary  -Multimodal pain control   -Ice  -DVT PPX: SCDs   -Please hold DVT prophylaxis in anticipation of OR    Patient history physical and plan were reviewed with Dr Mohini Ledezma, he was in agreement with the plan.

## 2023-02-02 NOTE — PROGRESS NOTES
VN completed admission questions with pt and  at this time. Pt resting in bed with call light in reach. No voiced questions or concerns. Pt did not know home medications.  stated he could bring in a list in order to complete.

## 2023-02-02 NOTE — ED TRIAGE NOTES
Pt presents to the ED with c/o bilateral leg swelling. Pt family states the pt has a history of blood clots in her legs. Dr. Clare Lucero at bedside and informed pt we do not have ultrasound here at night. Pt does have a trach and is current;y hooked to 1L/min oxygen when she is normally on 0.25L/min oxygen. Pt states she is feeling nauseous. VSS.

## 2023-02-02 NOTE — CONSULTS
Urology Consult Note      Reason for Consult:  Urinary retention and werner placement  Requesting Physician:  Dr. Teresita Gore    History Obtained From:  patient    Chief Complaint: Leg swelling, n/v    HISTORY OF PRESENT ILLNESS:                The patient is a 81 y.o. female chronically trach dependent since 2022 admitted to hospitalist service for acute on chronic heart failure and bilateral lower extremity swelling. Pt sustained a mechanical fall while in the ER department and has been found to have a R femur fracture. Urology consultation requested for urinary retention and werner placement. Nursing has attempted werner placement x 4 without success. Pt voided 200 mls this am via wick catheter. KUB completed this am with a distended bladder with excreted contrast.  Bladder scan around 1600 today with 289 mls. Pt reports a history of urinary retention and difficult werner catheter placement in the past.  She notes hx of  x 3 and hysterectomy but no prior urologic surgeries. Does not follow with urologist routinely. Notes hx of UTI recently that was difficult to resolve and recurred x 2. Notes hx of \"being able to hold her urine a long time\" over the years.         Past Medical History:        Diagnosis Date    Arthritis     Coronary artery disease     COVID     Hyperlipidemia     Primary hypertension     Type 2 diabetes mellitus ([facility] [address] 2018     Past Surgical History:        Procedure Laterality Date    CARDIAC SURGERY      CATARACT REMOVAL Bilateral      SECTION      3 times    CORONARY ANGIOPLASTY WITH STENT PLACEMENT      stenting twice    EYE SURGERY      HIATAL HERNIA REPAIR      late     HYSTERECTOMY, TOTAL ABDOMINAL (CERVIX REMOVED)      in     LARYNGOSCOPY N/A 3/22/2022    SUSPENSON LARYNGOSCOPY WITH JET VENT, DILATION performed by Preethi Kimball MD at [address] N/A 3/28/2022    SUSPENSION MICROLARYNGOSCOPY WITH BALLOON DILATION AND JET VENT, KENALOG INJECTION performed by Waddell Phalen, MD at [address]  N/A 4/7/2022    Suspension Laryngoscopy  Lateral of Right True Vocal Cord, With Steroid Injection of Larynx And Tracheostomy Tube Change, debridement performed by Nancy Adams MD at [address]  N/A 8/10/2022    TRANSORAL LARYNGOPLASTY WITH LEFT PARTIAL ARYTENODECTOMY AND POSS LATERALIZATION, ADVANCEMENT FLAP RECONSTRUCTION WITH JET VENT,THERAPUTIC BRONCHOSCOPY WITH DEBRIEDMENT,WITH BALLOON DILITATION performed by Nancy Adams MD at [address]  N/A 8/31/2022    Laryngoscopy with Dilation Debridement  Bronchoscopy with Dilation & Resection of Obstructing Tissues Transoral Laryngoplasty Left True Vocal Fold Lateralization left partial aryteniodectomy performed by Nancy Adams MD at [address]      [place] [address] 4/1/2022    TRACHEOTOMY TUBE REPLACEMENT performed by Waddell Phalen, MD at [facility] History     Socioeconomic History    Marital status:      Spouse name: bailee     Number of children: 3    Years of education: Not on file    Highest education level: Not on file   Occupational History    Not on file   Tobacco Use    Smoking status: Never    Smokeless tobacco: Never   Vaping Use    Vaping Use: Never used    Passive vaping exposure: Yes   Substance and Sexual Activity    Alcohol use: Not Currently     Comment: drinks 1 glass of wine cooler or wine about 3 times per year    Drug use: Never    Sexual activity: Not Currently   Other Topics Concern    Not on file   Social History Narrative    Not on file     Social Determinants of Health     Financial Resource Strain: Not on file   Food Insecurity: Not on file   Transportation Needs: Not on file   Physical Activity: Not on file   Stress: Not on file   Social Connections: Not on file   Intimate Partner Violence: Not on file   Housing Stability: Not on file       AL  Family History   Problem Relation Age of Onset    Parkinson's Disease Father     Lung Cancer Father        Allergies: Allergies   Allergen Reactions    Metformin And Related Other (See Comments)     diarrhea    Codeine Nausea And Vomiting    Meperidine Hcl Nausea And Vomiting    Sulfa Antibiotics Nausea And Vomiting    Sumatriptan Nausea And Vomiting       ROS:  Constitutional: Negative for chills, fatigue, fever, or weight loss. Eyes: Denies reported visual changes. ENT: Denies headache, difficulty swallowing, nose bleeds, ringing in ears, or earaches. Cardiovascular: Negative for chest pain, palpitations, tachycardia or edema. Respiratory: Denies cough or SOB. GI:  See HPI  : See HPI  Musculoskeletal: + R hip pain  Neurological: The patient denies any symptoms of neurological impairment or TIA's; no history of stroke. Lymphatic: Denies swollen glands in neck, axillary or inguinal areas. Psychiatric: Denies anxiety or depression. Skin: Denies rash or lesions. The remainder of the complete ROS is negative    PHYSICAL EXAM:  VITALS:  /78   Pulse (!) 102   Temp 98.8 °F (37.1 °C) (Oral)   Resp 20   Ht 5' 2\" (1.575 m)   Wt 210 lb (95.3 kg)   LMP  (LMP Unknown)   SpO2 96%   BMI 38.41 kg/m² . Nursing note and vitals reviewed. Constitutional:   Alert and oriented times 3, no acute distress and cooperative to examination with appropriate mood and affect. HEENT:   Head:   Normocephalic and atraumatic. Mouth/Throat:          Mucous membranes are normal.   Eyes:         EOM are normal. No scleral icterus. Nose:    The external appearance of the nose is normal  Ears: The ears appear normal to external inspection   Neck:         Supple, symmetrical, trachea midline, no adenopathy, thyroid symmetric, not enlarged and no tenderness. Cardiovascular:     Normal rate, regular rhythm  Pulmonary/Chest:    O2 via trach collar. Respirations even and unlabored. Abdominal:          Soft. Slightly distended.   Mild suprapubic tenderness to palpation. Genitalia: External Genitalia with swelling and mild irritation. Unable to visualize urethral meatus. Musculoskeletal:    BLE swelling. Reduced ROM R leg  Extremities:    BLE edema  Neurological:    Alert and oriented. No cranial nerve deficit. There are no focalizing motor or sensory deficits.     DATA:  CBC:   Lab Results   Component Value Date/Time    WBC 6.4 02/01/2023 10:50 PM    RBC 2.75 02/01/2023 10:50 PM    HGB 8.8 02/01/2023 10:50 PM    HCT 26.7 02/01/2023 10:50 PM    MCV 97.1 02/01/2023 10:50 PM    MCH 32.0 02/01/2023 10:50 PM    MCHC 33.0 02/01/2023 10:50 PM     02/01/2023 10:50 PM    MPV 9.8 02/01/2023 10:50 PM     BMP:    Lab Results   Component Value Date/Time     02/02/2023 08:10 AM    K 3.7 02/02/2023 08:10 AM    K 4.1 01/28/2023 05:20 AM     02/02/2023 08:10 AM    CO2 23 02/02/2023 08:10 AM    BUN 4 02/02/2023 08:10 AM    CREATININE 0.7 02/02/2023 08:10 AM    CALCIUM 8.0 02/02/2023 08:10 AM    LABGLOM >60 02/02/2023 08:10 AM    GLUCOSE 138 02/02/2023 08:10 AM     BUN/Creatinine:    Lab Results   Component Value Date/Time    BUN 4 02/02/2023 08:10 AM    CREATININE 0.7 02/02/2023 08:10 AM     Magnesium:    Lab Results   Component Value Date/Time    MG 1.9 02/02/2023 08:10 AM     Phosphorus:    Lab Results   Component Value Date/Time    PHOS 3.1 04/06/2022 05:00 AM     PT/INR:    Lab Results   Component Value Date/Time    INR 3.48 02/02/2023 08:20 AM     U/A:    Lab Results   Component Value Date/Time    COLORU YELLOW 02/02/2023 02:10 PM    PHUR 5.0 02/02/2023 02:10 PM    LABCAST NONE SEEN 02/02/2023 02:10 PM    LABCAST NONE SEEN 02/02/2023 02:10 PM    WBCUA 25-50 02/02/2023 02:10 PM    RBCUA 5-10 02/02/2023 02:10 PM    YEAST NONE SEEN 02/02/2023 02:10 PM    BACTERIA FEW 02/02/2023 02:10 PM    SPECGRAV >1.030 02/02/2023 02:10 PM    LEUKOCYTESUR MODERATE 02/02/2023 02:10 PM    UROBILINOGEN 0.2 02/02/2023 02:10 PM    BILIRUBINUR NEGATIVE 02/02/2023 02:10 PM    BLOODU MODERATE 02/02/2023 02:10 PM    GLUCOSEU NEGATIVE 01/21/2023 09:50 AM       Imaging: The patient has had a KUB which I have reviewed along with its accompanying report. The study demonstrates R femoral intertrochanteric fracture. Distended bladder. Supine AP abdomen. Comparison: 1/25/2023       Findings:       Significant less fecal contents within the colon. Nonobstructed bowel gas    pattern. No free intraperitoneal air. Moderate bladder distention with    excreted contrast.   Minimally displaced comminuted right lower intertrochanteric fracture with    subtrochanteric extension. Impression   Impression:    Resolved constipation. Right femoral intertrochanteric fracture. Urologic impression/plan:     Acute on possibly chronic urinary retention-pt reports hx of urinary retention in the past.  Recent UTIs difficult to resolve. ? Possible chronic component. Werner placed with some difficulty secondary to soft tissue swelling and positioning from R hip fx. Pt tolerated placement of 14 [place] catheter without complication. Return of approximately 800 mls of clear yellow urine. Will send urine with reflex. Empirically start 3 day course of antibiotics for prophylaxis with multiple catheter attempts and  manipulation. Given difficulty of placement and concern for possible chronic component recommend continuing werner catheter until patient mobile, having good BMs, diuresis completed and soft tissue swelling improved. Will need urology follow up after discharge.     Thank you for including us in the care of WYATT Adhikari [facility]  02/02/23 4:52 PM  Urology

## 2023-02-02 NOTE — ED NOTES
Pt vitals collected. Pt resting in bed with lights off. Pt denies any needs at this time. Pt respirations easy and unlabored.      Fahad Daniel RN  02/01/23 [id number]

## 2023-02-02 NOTE — ED NOTES
POST FALL MANAGEMENT    Mahi Worthy [address] RECORD NUMBER:  [id number]  AGE: 81 y.o. GENDER: female  : 1950  TODAYS DATE:  2023    Details     Fall Occurred: Yes    Was the Fall Witnessed:  No      Brief Review of Event: Patient standing up in front of bedside coomode, was preparing to sit onto bedside commode, slipped and landed on R hip        Who found the patient: Jacey Ascencio RN      Where was the patient at the time of the fall: next to bed, preparing to sit onto bedside commode. Patient Comments: \"My right hip hurts\" \"I did not hit my head, just pain in my R hip\"    Date Fall Occurred:  2023 . Time Fall Occurred: 4:06a.m. Assessment     Post Fall Head to Toe Assessment Completed: Yes    Post Fall Predictive Analytic Score Reviewed: No:      Post Fall Vitals Completed: Yes    Post Fall Neuro Checks Completed: Yes    Injury Occurred(if yes, describe injury):  yes - R hip pain           Did the Patient Experience:(Check [place] People all that apply)    [] Patient hit head  [] Loss of consciousness  [] Change in mental status following the fall  [] Patient is on an anticoagulant medication      CT Performed:  yes    Follow-up     Persons Notified of Fall:  (Provide names of persons notified)   [x] Physician: Dr. Kareen Guerrero ED phys.   [] KIMI:  [x] Nursing Supervisior: Amish Gilliland RN  [] Manager:  [] Pharmacist:  [x] Family: spouse was in room  [] Other:      Electronically signed by Tiffanie Bach RN 2023 at 4:33 AM       Tiffanie Bach, [address] [place] [address]  [phone]

## 2023-02-02 NOTE — CONSULTS
[facility]'s  Cardiology Consult      Patient:  Deven Alba  YOB: 1950    MRN: [id number]   Acct: [de-identified]     Primary Care Physician: Jesus Preston MD    REASON FOR CONSULT:    Heart failure exacerbation, recently take off lasix     CHIEF COMPLAINT:    SOB    HISTORY OF PRESENT ILLNESS:    Deven Alba is a pleasant 81year old female patient with past medical history that includes:   Past Medical History:   Diagnosis Date    Arthritis     Coronary artery disease     COVID     Hyperlipidemia     Primary hypertension     Type 2 diabetes mellitus ([facility] [address] 2018   She has h/o COVID1-9 in 12/2021, respiratory failure, s/p tracheostomy. She has known h/o CAD, prior PCI. She seems to have h/o CHF (was previously on diuretics, recently discontinued). TTE on 12/2021 revealed an EF of 30-35%. A stress test on 06/2022 was negative for ischemia. Most recent Echocardiogram on 1/23/2023 revealed an EF of 60-65%, grade I DD, LAE. She was recently admitted to the hospital and was treated for DVT/PE, discharged home on Xarelto. Since her diuretics were stopped, she was noted to have increased leg swelling per her family at bedside. The patient was admitted to the hospital on 2/2/2023 after she presented with leg swelling, nausea, vomiting. She has chronic mild shortness of breath, no recent worsening. Per chart review, patient had a fall, right hip fracture was noted on Xray, Orthopedics consulted. Cardiology was consulted for acute on chronic HFpEF. Patient denies chest pain. All labs, EKG's, diagnostic testing and images as well as cardiac cath, stress testing   were reviewed during this encounter    CARDIAC TESTING  Echo:   Summary  Technically difficult study due to poor acoustic windows. Left ventricle size is normal.  Normal left ventricular wall thickness. There was severe global hypokinesis of the left ventricle. Systolic function was severely reduced.   Ejection fraction is visually estimated in the range of 30% to 35%. Signature     ----------------------------------------------------------------  Electronically signed by Karyle Deforest MD (Interpreting  physician) on 2021 at 07:49 PM  ----------------------------------------------------------------    Echo    Summary   Technically difficult study due to poor acoustic windows. Normal left ventricle size and systolic function. Ejection fraction was   estimated at 60 to 65 %. There were no regional left ventricular wall   motion abnormalities and wall thickness was within normal limits. Doppler parameters were consistent with abnormal left ventricular   relaxation (grade 1 diastolic dysfunction). The left atrium is Mildly dilated. Signature      ----------------------------------------------------------------   Electronically signed by Karyle Deforest MD (Interpreting   physician) on 2023 at 05:30 PM    Stress Test:2022   Summary   Lexiscan EKG stress test is non-diagnostic for ischemia. Calculated gated LVEF 78 %. The T.I.D. ratio was 1.6 . Myocardial perfusion imaging is not suggestive for myocardial ischemia. This study was negative for ischemia. Recommendation   Clinical correlation is recommended. Aggressive risk factor management. Medical management.       Signatures      ----------------------------------------------------------------   Electronically signed by Catarino Cartagena MD (Interpreting   Cardiologist) on 2022 at 16:11   ----------------------------------------------------------------    Past Medical History:    Past Medical History:   Diagnosis Date    Arthritis     Coronary artery disease     COVID     Hyperlipidemia     Primary hypertension     Type 2 diabetes mellitus ([facility] [address]        Past Surgical History:    Past Surgical History:   Procedure Laterality Date    CARDIAC SURGERY      CATARACT REMOVAL Bilateral      SECTION      3 times    CORONARY ANGIOPLASTY WITH STENT PLACEMENT  2008    stenting twice    EYE SURGERY      HIATAL HERNIA REPAIR      late 1990s    HYSTERECTOMY, TOTAL ABDOMINAL (CERVIX REMOVED)      in 1990s    LARYNGOSCOPY N/A 3/22/2022    SUSPENSON LARYNGOSCOPY WITH JET VENT, DILATION performed by Karlene Corbin MD at [place] [address] N/A 3/28/2022    SUSPENSION MICROLARYNGOSCOPY WITH BALLOON DILATION AND JET VENT, KENALOG INJECTION performed by Karlene Corbin MD at [place] [address] N/A 4/7/2022    Suspension Laryngoscopy  Lateral of Right True Vocal Cord, With Steroid Injection of Larynx And Tracheostomy Tube Change, debridement performed by Angel Nolasco MD at [place] [address] N/A 8/10/2022    TRANSORAL LARYNGOPLASTY WITH LEFT PARTIAL ARYTENODECTOMY AND POSS LATERALIZATION, ADVANCEMENT FLAP RECONSTRUCTION WITH JET VENT,THERAPUTIC BRONCHOSCOPY WITH DEBRIEDMENT,WITH BALLOON DILITATION performed by Angel Nolasco MD at [place] [address] N/A 8/31/2022    Laryngoscopy with Dilation Debridement  Bronchoscopy with Dilation & Resection of Obstructing Tissues Transoral Laryngoplasty Left True Vocal Fold Lateralization left partial aryteniodectomy performed by Angel Nolasco MD at [address]      [place] [address] 4/1/2022    TRACHEOTOMY TUBE [facility]PLACEMENT [facility]       Medications Prior to Admission:    Medications Prior to Admission: ondansetron (ZOFRAN-ODT) 4 MG disintegrating tablet, Take 2 tablets by mouth every 8 hours as needed for Nausea or Vomiting  senna (SENOKOT) 8.6 MG tablet, Take 1 tablet by mouth nightly  rivaroxaban 15 & 20 MG Starter Pack, Take as directed on package.   insulin glargine (LANTUS SOLOSTAR) 100 UNIT/ML injection pen, Inject 22 Units into the skin nightly  amoxicillin-clavulanate (AUGMENTIN) 875-125 MG per tablet, Take 1 tablet by mouth 2 times daily for 10 days  sodium chloride flush 0.9 % injection, Infuse 5-40 mLs intravenously 2 times daily Flush 10 mL twice daily. tiZANidine (ZANAFLEX) 4 MG tablet,   Benzocaine-Menthol (CEPACOL) 15-2.3 MG LOZG, Take 1 lozenge by mouth every 3 hours as needed (sore throat, pain)  Fexofenadine HCl (MUCINEX ALLERGY PO), Take 1 tablet by mouth 2 times daily Cuts in half to swallow easier  insulin aspart (NOVOLOG FLEXPEN) 100 UNIT/ML injection pen, Inject 20-25 Units into the skin in the morning and 20-25 Units at noon and 20-25 Units in the evening. Inject before meals. (Patient taking differently: Inject 10 Units into the skin 2 times daily (before meals) 10 units with sliding scale at lunch and dinner)  glucagon 1 MG injection, Inject 1 mg into the muscle See Admin Instructions Follow package directions for low blood sugar. Insulin Pen Needle 31G X 6 MM MISC, 1 each by Does not apply route daily  albuterol (ACCUNEB) 0.63 MG/3ML nebulizer solution, Take 1 ampule by nebulization every 6 hours as needed for Wheezing  lidocaine (XYLOCAINE) 4 % external solution, Apply 1 Dose topically as needed for Pain Apply topically as needed. Cholecalciferol (VITAMIN D3 PO), Take by mouth daily 4000 units (Patient not taking: Reported on 1/21/2023)  NONFORMULARY, daily resveratrol  guaiFENesin 400 MG tablet, Take 400 mg by mouth 2 times daily as needed for Cough  metoprolol succinate (TOPROL XL) 25 MG extended release tablet, Take 1 tablet by mouth daily  albuterol sulfate  (90 Base) MCG/ACT inhaler, Inhale 2 puffs into the lungs every 6 hours as needed for Wheezing  OXYGEN, Inhale 2 L/min into the lungs continuous prn (during activities such as walking)  rosuvastatin (CRESTOR) 10 MG tablet, Take 10 mg by mouth daily  nitroGLYCERIN (NITROSTAT) 0.4 MG SL tablet, Place 0.4 mg under the tongue every 5 minutes as needed for Chest pain up to max of 3 total doses.  If no relief after 1 dose, call 911.   aspirin 81 MG chewable tablet, Take 81 mg by mouth daily    Allergies:    Metformin and related, Codeine, Meperidine hcl, Sulfa antibiotics, and Sumatriptan    Social History:    reports that she has never smoked. She has never used smokeless tobacco. She reports that she does not currently use alcohol. She reports that she does not use drugs. Family History:   family history includes Lung Cancer in her father; Parkinson's Disease in her father. REVIEW OF SYSTEMS:  Constitutional: negative for anorexia, chills and fevers,weight change  Skin: negative for new skin rash per patient  HEENT: negative for head trauma or new visual changes  Respiratory: negative for cough, hemoptysis, wheezing  Cardiovascular: negative for  orthopnea, palpitations and syncope. Gastrointestinal: negative for abdominal pain,nausea , vomiting, constipation, diarrhea.   Hematologic/lymphatic: negative for bruising,prolonged bleeding,blood clots  Musculoskeletal:negative for muscle weakness, myalgias,wasting  Neurological: negative for coordination problems, dizziness, gait problems and vertigo  Behavioral/Psych:negative for mood/sleep disturbance      PHYSICAL EXAM:   Vitals:Patient Vitals for the past 24 hrs:   BP Temp Temp src Pulse Resp SpO2 Height Weight   02/02/23 1518 -- -- -- -- 16 -- -- --   02/02/23 1130 119/76 99 °F (37.2 °C) Oral (!) 101 20 97 % -- --   02/02/23 0856 -- -- -- -- 22 -- -- --   02/02/23 0845 111/81 -- -- (!) 101 -- -- -- --   02/02/23 0802 111/80 97.8 °F (36.6 °C) Axillary (!) 102 16 97 % -- --   02/02/23 0645 127/71 -- -- 88 16 96 % -- --   02/02/23 0612 -- -- -- -- 19 -- -- --   02/02/23 0600 138/77 97.7 °F (36.5 °C) Oral 98 18 -- -- --   02/02/23 0345 130/78 -- -- 98 20 100 % -- --   02/02/23 0230 131/82 -- -- 98 17 100 % -- --   02/02/23 0148 135/86 -- -- 91 17 99 % -- --   02/02/23 0054 138/82 -- -- 90 18 99 % -- --   02/02/23 0002 (!) 119/91 -- -- 91 17 100 % -- --   02/01/23 2256 129/76 -- -- 99 18 99 % -- --   02/01/23 2211 (!) 159/92 98.2 °F (36.8 °C) Oral (!) 120 26 100 % 5' 2\" (1.575 m) 210 lb (95.3 kg)       Last 3 weights: Wt Readings from Last 3 Encounters:   02/01/23 210 lb (95.3 kg)   01/27/23 210 lb (95.3 kg)   11/22/22 200 lb (90.7 kg)     24 hour intake/output:  Intake/Output Summary (Last 24 hours) at 2/2/2023 1557  Last data filed at 2/2/2023 1409  Gross per 24 hour   Intake 100 ml   Output 200 ml   Net -100 ml     BMI:Body mass index is 38.41 kg/m². General Appearance: alert and oriented to person, place and time, well developed and well- nourished, in no acute distress  Skin: warm and dry, no rash or erythema  Eyes: pupils equal, round, and reactive to light, extraocular eye movements intact, conjunctivae normal  Neck: supple and non-tender without mass, no thyromegaly or thyroid nodules, no cervical lymphadenopathy  Pulmonary/Chest: clear to auscultation bilaterally- no wheezes, rales or rhonchi, normal air movement, no respiratory distress  Cardiovascular: normal rate, regular rhythm, normal S1 and S2, no murmur. No rubs, clicks, or gallops, distal pulses intact, no carotid bruits, Negative JVD  Radial Pulses: intact 2+  Abdomen: soft, non-tender, non-distended, normal bowel sounds, no masses or organomegaly  Extremities: no cyanosis, clubbing . +1 pitting Edema  Musculoskeletal: normal range of motion, no joint swelling, deformity or tenderness      RADIOLOGY   XR ABDOMEN (KUB) (SINGLE AP VIEW)    Result Date: 2/2/2023  Supine AP abdomen. Comparison: 1/25/2023 Findings: Significant less fecal contents within the colon. Nonobstructed bowel gas pattern. No free intraperitoneal air. Moderate bladder distention with excreted contrast. Minimally displaced comminuted right lower intertrochanteric fracture with subtrochanteric extension. Impression:  Resolved constipation. Right femoral intertrochanteric fracture. This document has been electronically signed by: Teresita Hwang on 02/02/2023 05:35 AM    CT HEAD WO CONTRAST    Result Date: 2/2/2023  PROCEDURE: CT HEAD WO CONTRAST CLINICAL INFORMATION: Slip and fall, pain. COMPARISON: No prior study. TECHNIQUE: Noncontrast 5 mm axial images were obtained through the brain. Sagittal and coronal reconstructions were obtained. All CT scans at this facility use dose modulation, iterative reconstruction, and/or weight-based dosing when appropriate to reduce radiation dose to as low as reasonably achievable. FINDINGS: The brain volume is within acceptable limits. There is no hemorrhage. There are no intra-or extra-axial collections. There is no hydrocephalus, midline shift or mass effect. There is a moderate amount of abnormal low attenuation in the white matter the brain suggesting chronic small vessel ischemic changes. The paranasal sinuses and mastoid air cells are normally aerated. There is no suspicious calvarial abnormality. No evidence of an acute process. **This report has been created using voice recognition software. It may contain minor errors which are inherent in voice recognition technology. ** Final report electronically signed by Dr. Kory Dodge on 2/2/2023 7:25 AM    CTA CHEST W WO CONTRAST    Result Date: 2/2/2023  CTA Chest with contrast: PE Protocol. Indication: Shortness of breath. Recent PE. Technique: CTA chest with intravenous contrast. Coronal and sagittal reformations. MIP images. Comparison: CT/KO/SR - CTA CHEST W WO CONTRAST - 01/21/2023 09:34 AM EST Findings: Tracheostomy tube 4.8 cm above the telma. Right PICC line with tip in the lower SVC, new since prior study. Removal of the left PICC line. The intravenous contrast bolus adequately opacifies pulmonary arterial vasculature. Nonocclusive intraluminal filling defects in the right lower lobe segmental/subsegmental branches of the pulmonary artery (series 601 images ) as well as left lower lobe branch of the pulmonary artery (series 601 image 65-69), similar to previous study. No new areas of pulmonary embolus. New trace bilateral pleural effusions. Lawanda Black Mild interstitial thickening, similar to previous study. Stable right lower lobe nodule up to 0.5 cm. Respiratory motion artifacts and atelectasis. No bulky mediastinal, hilar, or axillary lymphadenopathy. Heart is enlarged in size. Stent versus calcification LAD. Coronary atherosclerotic calcifications. Trace pericardial effusion. Lipomatous hypertrophy of the interatrial septum. The thoracic aorta is normal in caliber, with mild atherosclerotic calcifications. Small hiatal hernia. Partially imaged upper abdomen: No upper abdominal ascites. Bones: Spondylosis. Stable sclerosis of the posterior left 9th rib. T12 and L1 vertebral hemangiomata. Impression: 1. Stable bilateral nonocclusive pulmonary emboli. Negative for CT evidence of right heart strain or thrombus propagation. No new pulmonary emboli since the prior study 1/21/23. 2. New trace bilateral pleural effusions. 3. Nonemergent/incidental findings as above. This document has been electronically signed by: Danna Welsh MD on 02/02/2023 02:13 AM All CTs at this facility use dose modulation techniques and iterative reconstructions, and/or weight-based dosing when appropriate to reduce radiation to a low as reasonably achievable. 3D Post-processing was performed on this study. CT CERVICAL SPINE WO CONTRAST    Result Date: 2/2/2023  PROCEDURE: CT CERVICAL SPINE WO CONTRAST CLINICAL INFORMATION: Slip and fall, pain. COMPARISON: No prior study. TECHNIQUE: 3 mm noncontrast axial images were obtained through the cervical spine with sagittal and coronal reconstructions. All CT scans at this facility use dose modulation, iterative reconstruction, and/or weight-based dosing when appropriate to reduce radiation dose to as low as reasonably achievable. FINDINGS: There is osseous demineralization. The cervical vertebral bodies are normally aligned. There is no fracture. There is no prevertebral soft tissue swelling. There are some scattered degenerative changes. There is no significant spinal canal stenosis. No suspicious osseous lesions are present. There are no suspicious findings in the cervical soft tissues. There are no suspicious findings in the lung apices. There is a tracheostomy tube. No fracture. **This report has been created using voice recognition software. It may contain minor errors which are inherent in voice recognition technology. ** Final report electronically signed by Dr. Kraig Patel on 2/2/2023 7:38 AM    XR CHEST PORTABLE    Result Date: 2/2/2023  PROCEDURE: XR CHEST PORTABLE CLINICAL INFORMATION: post PICC placement COMPARISON: Earlier film same date, 0426 hours. TECHNIQUE: A single mobile view of the chest was obtained. 1. Marked cardiomegaly. Right arm PICC line, catheter tip at cavoatrial projection. . Tracheostomy tube in place. 2. No acute findings. No infiltrates or effusions are seen. **This report has been created using voice recognition software. It may contain minor errors which are inherent in voice recognition technology. ** Final report electronically signed by Dr. Bere Collins on 2/2/2023 2:20 PM    XR CHEST 1 VIEW    Result Date: 2/2/2023  Chest X-ray: 1 view. Indication: Slip and fall. Comparison: CT/SR - CTA CHEST W WO CONTRAST - 02/02/2023 12:59 AM EST Findings: Tracheostomy tube in position. Right PICC line, with tip obscured by the overlapping thoracic spine. No pneumothorax. No moderate or large pleural effusion. Mild low lung volumes. No pulmonary edema. The cardiac silhouette is enlarged in size. Bony thorax is grossly intact. Spondylosis. Mild calcific tendinosis right glenohumeral joint. Degenerative osteoarthritis acromioclavicular joints. Impression: No acute cardiopulmonary disease. Cardiomegaly. This document has been electronically signed by: Matt Mercado MD on 02/02/2023 05:02 AM    XR HIP 2-3 VW W PELVIS RIGHT    Result Date: 2/2/2023  Right hip x-ray: 2 views with AP pelvis. Indication: Slip and fall. Comparison: None Findings: Acute right femoral intertrochanteric fracture, with subtrochanteric extension. Normal alignment of the right femoroacetabular joint. Bony demineralization. Normal alignment of the bilateral sacroiliac and left hip joints. Lumbar spondylosis. Excreted contrast in the urinary bladder. Peripheral arterial vascular calcifications. Impression: Acute right femoral intertrochanteric fracture, with subtrochanteric extension.  This document has been electronically signed by: Meaghan Love MD on 02/02/2023 05:01 AM      LABS:  Recent Labs     02/01/23  2250 02/02/23  0820   TROPONINT 0.027* 0.025*     CBC:   Lab Results   Component Value Date/Time    WBC 6.4 02/01/2023 10:50 PM    RBC 2.75 02/01/2023 10:50 PM    HGB 8.8 02/01/2023 10:50 PM    HCT 26.7 02/01/2023 10:50 PM    MCV 97.1 02/01/2023 10:50 PM    MCH 32.0 02/01/2023 10:50 PM    MCHC 33.0 02/01/2023 10:50 PM     02/01/2023 10:50 PM    MPV 9.8 02/01/2023 10:50 PM     BMP:    Lab Results   Component Value Date/Time     02/02/2023 08:10 AM    K 3.7 02/02/2023 08:10 AM    K 4.1 01/28/2023 05:20 AM     02/02/2023 08:10 AM    CO2 23 02/02/2023 08:10 AM    BUN 4 02/02/2023 08:10 AM    LABALBU 3.1 02/01/2023 10:50 PM    CREATININE 0.7 02/02/2023 08:10 AM    CALCIUM 8.0 02/02/2023 08:10 AM    LABGLOM >60 02/02/2023 08:10 AM    GLUCOSE 138 02/02/2023 08:10 AM     Hepatic Function Panel:    Lab Results   Component Value Date/Time    ALKPHOS 95 02/01/2023 10:50 PM    ALT 12 02/01/2023 10:50 PM    AST 14 02/01/2023 10:50 PM    PROT 5.2 02/01/2023 10:50 PM    BILITOT 0.4 02/01/2023 10:50 PM    BILIDIR <0.2 02/01/2023 10:50 PM    LABALBU 3.1 02/01/2023 10:50 PM     Magnesium:    Lab Results   Component Value Date/Time    MG 1.9 02/02/2023 08:10 AM     Warfarin PT/INR:  No components found for: PTPATWAR, PTINRWAR  HgBA1c:    Lab Results   Component Value Date/Time    LABA1C 5.5 01/24/2023 05:40 AM     FLP:  No results found for: TRIG, HDL, LDLCALC, LDLDIRECT, LABVLDL  TSH:    Lab Results   Component Value Date/Time    TSH 6.880 02/02/2023 08:10 AM     BNP: No results found for: BNP     ASSESSMENT:  Acute on chronic HFpEF  H/o CHF, recovered EF  CAD  Prior h/o PCI  DM  Dyslipidemia   H/o COVID  H/o chronic respiratory failure  S/p prior tracheostomy   Hypertension   H/o PE, DVT    RECOMMENDATIONS:  Has h/o CHF, recovered EF   TTE on 12/2021 revealed an EF of 30-35%  Most recent Echocardiogram on 1/23/2023 revealed an EF of 60-65%, grade I DD, LAE  Cardiology was consulted for acute on chronic HFpEF  Agree with Rx for CHF  Has mild volume overload   Give one dose of IV Lasix now  Start lasix 20 mg po daily in AM  KCL supplements   Monitor fluid intake and output, maintain a negative fluid balance  Fluid restriction to less than 2 liters per day  Na restriction to less than 2,000 mg per day  Daily weight  Monitor on Telemetry   Monitor electrolytes, keep K>4 and Mg>2  Daily BMP  Refer to CHF clinic   She has known h/o CAD, prior PCI  Denies chest pain  A recent stress test on 06/2022 was negative for ischemia  Cont medical management for CAD     Above findings and plan of care were discussed with patient and her family, questions were answered, agreeable to plan. Thank you for allowing me to participate in the care of this patient. Please let me know if I can be of any further assistance.       Zohaib Epstein MD, Gill Ramos   3:57 PM  2/2/2023

## 2023-02-02 NOTE — ED NOTES
Bedside report given to Coleen Dumont, [facility], [address] [place] [address]  02/02/23 3941

## 2023-02-02 NOTE — PROGRESS NOTES
Pt admitted to  4K24 from ED. Complaints: Pain. IV site free of s/s of infection or infiltration. Vital signs obtained. Assessment and data collection initiated. Two nurse skin assessment performed by brett CRAWFORD and Nannette Morrell. Oriented to room. Policies and procedures for 4[facility] explained. All questions answered with no further questions at this time. Fall prevention and safety brochure discussed with patient. Bed alarm on. Call light in reach.

## 2023-02-02 NOTE — PLAN OF CARE
Problem: Discharge Planning  Goal: Discharge to home or other facility with appropriate resources  Outcome: Progressing     Problem: Safety - Adult  Goal: Free from fall injury  Outcome: Progressing     Problem: Skin/Tissue Integrity  Goal: Absence of new skin breakdown  Description: 1. Monitor for areas of redness and/or skin breakdown  2. Assess vascular access sites hourly  3. Every 4-6 hours minimum:  Change oxygen saturation probe site  4. Every 4-6 hours:  If on nasal continuous positive airway pressure, respiratory therapy assess nares and determine need for appliance change or resting period.   Outcome: Progressing     Problem: Neurosensory - Adult  Goal: Achieves stable or improved neurological status  Outcome: Progressing  Goal: Achieves maximal functionality and self care  Outcome: Progressing     Problem: Respiratory - Adult  Goal: Achieves optimal ventilation and oxygenation  Outcome: Progressing     Problem: Cardiovascular - Adult  Goal: Maintains optimal cardiac output and hemodynamic stability  Outcome: Progressing     Problem: Pain  Goal: Verbalizes/displays adequate comfort level or baseline comfort level  Outcome: Progressing     Problem: Chronic Conditions and Co-morbidities  Goal: Patient's chronic conditions and co-morbidity symptoms are monitored and maintained or improved  Outcome: Progressing

## 2023-02-02 NOTE — CARE COORDINATION
Collaborative Discharge Planning    Slim Hager  :  1950  MRN:  [id number]    ADMIT DATE:  2023      Discharge Planning Discharge Planning  Type of Residence: House  Living Arrangements: Spouse/Significant Other  Support Systems: Spouse/Significant Other  Current Services Prior To Admission: [facility] Jimenez  Current DME Prior to Arrival: Voncile Hamman, Wheelchair  Potential Assistance Needed: [address], Santiago Jimenez  DME Ordered?: No  Potential Assistance Purchasing Medications: No  Type of Home Care Services: Nursing Services, OT, PT, Skilled Therapy, Aide Services  Patient expects to be discharged to[de-identified] Rehabilitation facility  Follow Up Appointment: Best Day/Time :  (PM)  One/Two Story Residence: One story  White Board Notes /Social Work Whiteboard Notes  /Social Work Whiteboard:  SW: From home with  and current [facility] for RN and PT. ECF v. [facility] at discharge.  has [employer] home oxygen 2-3L/trach/suction/PICC/nebulizer/WC/PMV; plans new CHF Clinic    Discharge Plan Rehab  Lives w spouse Rosanne Gottron; has [facility] (nsg, therapy); prefers [facility] or [facility] for therapy/rehab needs; has [employer] home oxygen 0.25L, trach/suction/nebulizer supplies; PICC PTA; has walker, WC; plans new CHF Clinic  Discharge Milestones and Delays: Clinical status  CHF  History: Tracheostomy, DM, BLE DVT, Pulmonary Emboli    2/ Fall w Right Femur Fracture - ED    /3 Right Femur Fixation surgery planned    Anticoagulation held for surgery      SIGNED:  Alexandra Fernandez RN   2023, 12:09 PM

## 2023-02-02 NOTE — PROGRESS NOTES
Pt is a 81year old female with acute heart failure, admitted on 02-. Pt VS Temp of 98.8, P 102, R 20, /78, O2 96 on 4.5L simple mask, and states a pain of 7-10. Pt is alert and oriented x 4. PERRL 3mm-2mm. Hand grasps strong and equal bilaterally. Upper extremities pink, warm, and dry with sensation present. Pitting edema +2 in upper extremities. Skin turgor < 3 seconds. Capillary refill < 3 seconds. Heart sounds strong and regular. Lung sounds clear throughout. Abdomen soft and tender. Bowel sounds heard in all four quadrants. Lower extremities pink, warm, and dry with sensation present. Pedal push and pull strong in left foot. Pt unable to assess right foot due to right femur fracture pain. Dorsalis pedis and posterior tibialus pulses non present. Pitting edema +3 in lower extremities. Coccyx pink, dry, and intact. Heels pink, dry, and intact. Pt is resting in bed with call light within reach. MARLY Albarran.

## 2023-02-02 NOTE — ED PROVIDER NOTES
[facility]  eMERGENCY dEPARTMENT eNCOUnter          CHIEF COMPLAINT       Chief Complaint   Patient presents with    Leg Swelling       Nurses Notes reviewed and I agree except as noted in the HPI. HISTORY OF PRESENT ILLNESS    Efrain Szymanski is a 81 y.o. female who presents for increasing leg swelling. Patient was just discharged from the hospital on 1/21/2023. She was discovered to have bilateral DVTs and PEs. She is supposed to be on rivaroxaban. She states she is taking her medication. She called the home health nurse tonight who instructed her to come in for evaluation and treatment. Swelling has been ongoing for quite some time. Patient comes in with tracheostomy in place. She had COVID last year. She is on 1 to 2 L of oxygen 24 hours a day. Patient was discharged home, she stated that she continue to feel worse [facility] I had a long discussion with him at bedside about the inability to evaluate the calf veins, there is no ultrasound available however we will get a CTA of her chest.  We will run the labs. Patient is also complaining about anxiety, she is also having pain. Apparently she had some sort of dental problem they gave her pain medication. She was having upset stomach she had 1 bout of vomiting. Patient does have a cardiac history, she has 2 stents in place, she does not know if she has heart failure or not. Patient is otherwise resting on the cot no apparent distress mild to moderate anxiety no other physical complaints at this time. Echocardiogram   Conclusions      Summary   Technically difficult study due to poor acoustic windows. Normal left ventricle size and systolic function. Ejection fraction was   estimated at 60 to 65 %. There were no regional left ventricular wall   motion abnormalities and wall thickness was within normal limits.    Doppler parameters were consistent with abnormal left ventricular   relaxation (grade 1 diastolic dysfunction). The left atrium is Mildly dilated. Signature      ----------------------------------------------------------------   Electronically signed by Jigar Heredia MD (Interpreting   physician) on 01/23/2023 at 05:30 PM   ----------------------------------------------------------------    REVIEW OF SYSTEMS     Review of Systems   Constitutional:  Negative for activity change, appetite change, chills, diaphoresis, fatigue, fever and unexpected weight change. HENT:  Negative for congestion, ear discharge, ear pain, hearing loss, rhinorrhea, sinus pressure, sore throat, trouble swallowing and voice change. Eyes:  Negative for photophobia, pain, discharge, redness and itching. Respiratory:  Positive for cough and shortness of breath. Negative for choking, chest tightness and wheezing. Cardiovascular:  Positive for leg swelling. Negative for chest pain and palpitations. Gastrointestinal:  Negative for abdominal distention, abdominal pain, blood in stool, constipation, diarrhea, nausea and vomiting. Endocrine: Negative for polydipsia, polyphagia and polyuria. Genitourinary:  Negative for decreased urine volume, difficulty urinating, dysuria, enuresis, frequency, hematuria and urgency. Musculoskeletal:  Negative for arthralgias, back pain, gait problem, myalgias, neck pain and neck stiffness. Skin:  Negative for pallor and rash. Allergic/Immunologic: Negative for immunocompromised state. Neurological:  Positive for weakness. Negative for dizziness, tremors, seizures, syncope, facial asymmetry, light-headedness, numbness and headaches. Hematological:  Negative for adenopathy. Does not bruise/bleed easily. Psychiatric/Behavioral:  Negative for agitation, hallucinations and suicidal ideas. The patient is not nervous/anxious.          PAST MEDICAL HISTORY    has a past medical history of Arthritis, Coronary artery disease, COVID, Hyperlipidemia, Primary hypertension, and Type 2 diabetes mellitus ([facility] [address] SURGICAL HISTORY      has a past surgical history that includes Coronary angioplasty with stent (); Cataract removal (Bilateral, );  section; Hysterectomy, total abdominal; hiatal hernia repair; sinus surgery; Cardiac surgery; eye surgery; laryngoscopy (N/A, 3/22/2022); laryngoscopy (N/A, 3/28/2022); tracheostomy tube change (N/A, 2022); laryngoscopy (N/A, 2022); laryngoscopy (N/A, 8/10/2022); and laryngoscopy (N/A, 2022). CURRENT MEDICATIONS       Previous Medications    ALBUTEROL (ACCUNEB) 0.63 MG/3ML NEBULIZER SOLUTION    Take 1 ampule by nebulization every 6 hours as needed for Wheezing    ALBUTEROL SULFATE  (90 BASE) MCG/ACT INHALER    Inhale 2 puffs into the lungs every 6 hours as needed for Wheezing    AMOXICILLIN-CLAVULANATE (AUGMENTIN) 875-125 MG PER TABLET    Take 1 tablet by mouth 2 times daily for 10 days    ASPIRIN 81 MG CHEWABLE TABLET    Take 81 mg by mouth daily    BENZOCAINE-MENTHOL (CEPACOL) 15-2.3 MG LOZG    Take 1 lozenge by mouth every 3 hours as needed (sore throat, pain)    CHOLECALCIFEROL (VITAMIN D3 PO)    Take by mouth daily 4000 units    FEXOFENADINE HCL (MUCINEX ALLERGY PO)    Take 1 tablet by mouth 2 times daily Cuts in half to swallow easier    GLUCAGON 1 MG INJECTION    Inject 1 mg into the muscle See Admin Instructions Follow package directions for low blood sugar. GUAIFENESIN 400 MG TABLET    Take 400 mg by mouth 2 times daily as needed for Cough    INSULIN ASPART (NOVOLOG FLEXPEN) 100 UNIT/ML INJECTION PEN    Inject 20-25 Units into the skin in the morning and 20-25 Units at noon and 20-25 Units in the evening. Inject before meals.     INSULIN GLARGINE (LANTUS SOLOSTAR) 100 UNIT/ML INJECTION PEN    Inject 22 Units into the skin nightly    INSULIN PEN NEEDLE 31G X 6 MM MISC    1 each by Does not apply route daily    LIDOCAINE (XYLOCAINE) 4 % EXTERNAL SOLUTION    Apply 1 Dose topically as needed for Pain Apply topically as needed. METOPROLOL SUCCINATE (TOPROL XL) 25 MG EXTENDED RELEASE TABLET    Take 1 tablet by mouth daily    NITROGLYCERIN (NITROSTAT) 0.4 MG SL TABLET    Place 0.4 mg under the tongue every 5 minutes as needed for Chest pain up to max of 3 total doses. If no relief after 1 dose, call 911. NONFORMULARY    daily resveratrol    ONDANSETRON (ZOFRAN-ODT) 4 MG DISINTEGRATING TABLET    Take 2 tablets by mouth every 8 hours as needed for Nausea or Vomiting    OXYGEN    Inhale 2 L/min into the lungs continuous prn (during activities such as walking)    RIVAROXABAN 15 & 20 MG STARTER PACK    Take as directed on package. ROSUVASTATIN (CRESTOR) 10 MG TABLET    Take 10 mg by mouth daily    SENNA (SENOKOT) 8.6 MG TABLET    Take 1 tablet by mouth nightly    SODIUM CHLORIDE FLUSH 0.9 % INJECTION    Infuse 5-40 mLs intravenously 2 times daily Flush 10 mL twice daily. TIZANIDINE (ZANAFLEX) 4 MG TABLET           ALLERGIES     is allergic to metformin and related, codeine, meperidine hcl, sulfa antibiotics, and sumatriptan. FAMILY HISTORY     She indicated that her father is . family history includes Lung Cancer in her father; Parkinson's Disease in her father. SOCIAL HISTORY      reports that she has never smoked. She has never used smokeless tobacco. She reports that she does not currently use alcohol. She reports that she does not use drugs. PHYSICAL EXAM     INITIAL VITALS:  height is 5' 2\" (1.575 m) and weight is 210 lb (95.3 kg). Her oral temperature is 98.2 °F (36.8 °C). Her blood pressure is 130/78 and her pulse is 98. Her respiration is 20 and oxygen saturation is 100%. Physical Exam  Vitals and nursing note reviewed. Constitutional:       General: She is not in acute distress. Appearance: She is well-developed. She is not diaphoretic. HENT:      Head: Normocephalic and atraumatic.       Right Ear: External ear normal.      Left Ear: External ear normal.      Nose: Nose normal.      Mouth/Throat:      Pharynx: No oropharyngeal exudate. Eyes:      General: No scleral icterus. Right eye: No discharge. Left eye: No discharge. Conjunctiva/sclera: Conjunctivae normal.      Pupils: Pupils are equal, round, and reactive to light. Neck:      Thyroid: No thyromegaly. Vascular: No JVD. Trachea: No tracheal deviation. Cardiovascular:      Rate and Rhythm: Normal rate and regular rhythm. Pulses:           Carotid pulses are 1+ on the right side and 1+ on the left side. Radial pulses are 1+ on the right side and 1+ on the left side. Femoral pulses are 1+ on the right side and 1+ on the left side. Popliteal pulses are 1+ on the right side and 1+ on the left side. Dorsalis pedis pulses are 1+ on the right side and 1+ on the left side. Posterior tibial pulses are 1+ on the right side and 1+ on the left side. Heart sounds: Normal heart sounds, S1 normal and S2 normal. No murmur heard. No friction rub. No gallop. Pulmonary:      Effort: Pulmonary effort is normal. No tachypnea, bradypnea, accessory muscle usage, prolonged expiration or respiratory distress. Breath sounds: Transmitted upper airway sounds present. No stridor. Examination of the right-upper field reveals wheezing and rhonchi. Examination of the left-upper field reveals wheezing and rhonchi. Examination of the right-middle field reveals wheezing and rhonchi. Examination of the left-middle field reveals wheezing and rhonchi. Examination of the right-lower field reveals decreased breath sounds. Examination of the left-lower field reveals decreased breath sounds. Decreased breath sounds, wheezing and rhonchi present. No rales. Chest:      Chest wall: No tenderness. Abdominal:      General: Bowel sounds are normal. There is no distension. Palpations: Abdomen is soft. There is no mass. Tenderness:  There is no abdominal tenderness. There is no guarding or rebound. Hernia: No hernia is present. Musculoskeletal:         General: No tenderness. Normal range of motion. Cervical back: Normal range of motion and neck supple. Right lower le+ Pitting Edema present. Left lower le+ Pitting Edema present. Lymphadenopathy:      Cervical: No cervical adenopathy. Skin:     General: Skin is warm and dry. Findings: No bruising, ecchymosis, lesion or rash. Neurological:      Mental Status: She is alert and oriented to person, place, and time. GCS: GCS eye subscore is 4. GCS verbal subscore is 5. GCS motor subscore is 6. Cranial Nerves: Cranial nerves 2-12 are intact. No cranial nerve deficit. Sensory: Sensation is intact. Motor: Motor function is intact. Coordination: Coordination is intact. Coordination normal.      Gait: Gait is intact. Deep Tendon Reflexes: Reflexes are normal and symmetric. Reflex Scores:       Tricep reflexes are 2+ on the right side and 2+ on the left side. Bicep reflexes are 2+ on the right side and 2+ on the left side. Brachioradialis reflexes are 2+ on the right side and 2+ on the left side. Patellar reflexes are 2+ on the right side and 2+ on the left side. Achilles reflexes are 2+ on the right side and 2+ on the left side. Psychiatric:         Speech: Speech normal.         Behavior: Behavior normal.         Thought Content: Thought content normal.         Judgment: Judgment normal.         DIFFERENTIAL DIAGNOSIS:   COPD CHF COVID-pneumonia bronchitis worsening PE or DVT    DIAGNOSTIC RESULTS     EKG: All EKG's are interpreted by the Emergency Department Physician who either signs or Co-signs this chart in the absence of a cardiologist.  Sinus tachycardia, normal axis, ventricular rate 107 TN interval 148 QRS duration 80 QT interval 372 QTC of 496.     RADIOLOGY: non-plain film images(s) such as CT, Ultrasound and MRI are read by the radiologist.  [address] [place] [address]   Final Result   Impression:   1. Stable bilateral nonocclusive pulmonary emboli. Negative for CT    evidence of right heart strain or thrombus propagation. No new pulmonary    emboli since the prior study 1/21/23. 2. New trace bilateral pleural effusions. 3. Nonemergent/incidental findings as above. This document has been electronically signed by: Prince Wall MD on    02/02/2023 02:13 AM      All CTs at this facility use dose modulation techniques and iterative    reconstructions, and/or weight-based dosing   when appropriate to reduce radiation to a low as reasonably achievable. 3D Post-processing was performed on this study.       XR HIP 2-3 VW W PELVIS RIGHT    (Results Pending)   CT CERVICAL SPINE WO CONTRAST    (Results Pending)   CT HEAD WO CONTRAST    (Results Pending)   XR CHEST 1 VIEW    (Results Pending)   XR ABDOMEN (KUB) (SINGLE AP VIEW)    (Results Pending)         LABS:   Labs Reviewed   PROTIME-INR - Abnormal; Notable for the following components:       Result Value    INR 3.83 (*)     All other components within normal limits   APTT - Abnormal; Notable for the following components:    aPTT 53.3 (*)     All other components within normal limits   CBC WITH AUTO DIFFERENTIAL - Abnormal; Notable for the following components:    RBC 2.75 (*)     Hemoglobin 8.8 (*)     Hematocrit 26.7 (*)     RDW-CV 15.3 (*)     RDW-SD 53.0 (*)     Immature Grans (Abs) 0.18 (*)     All other components within normal limits   BRAIN NATRIURETIC PEPTIDE - Abnormal; Notable for the following components:    Pro-.7 (*)     All other components within normal limits   BASIC METABOLIC PANEL - Abnormal; Notable for the following components:    Glucose 138 (*)     BUN 6 (*)     All other components within normal limits   HEPATIC FUNCTION PANEL - Abnormal; Notable for the following components:    Albumin 3.1 (*)     Total Protein 5.2 (*) All other components within normal limits   TROPONIN - Abnormal; Notable for the following components:    Troponin T 0.027 (*)     All other components within normal limits   MAGNESIUM - Abnormal; Notable for the following components:    Magnesium 1.5 (*)     All other components within normal limits   POCT GLUCOSE - Abnormal; Notable for the following components:    POC Glucose 128 (*)     All other components within normal limits   LIPASE   ANION GAP   GLOMERULAR FILTRATION RATE, ESTIMATED   OSMOLALITY   LACTIC ACID   TROPONIN   MAGNESIUM       EMERGENCY DEPARTMENT COURSE:   Vitals:    Vitals:    02/02/23 0054 02/02/23 0148 02/02/23 0230 02/02/23 0345   BP: 138/82 135/86 131/82 130/78   Pulse: 90 91 98 98   Resp: 18 17 17 20   Temp:       TempSrc:       SpO2: 99% 99% 100% 100%   Weight:       Height:         Patient was assessed at bedside labs and imaging were ordered. Patient was given Zofran and Ativan. Patient told me that she felt very scared and did not want to go home. Here today I reviewed her labs. Her POCT glucose was 128. Her INR is supratherapeutic at 3.83. She has no white blood cell count. H&H appears to be stable. Platelets are within normal limits. Heart failure markers are elevated for her. BUN and creatinine are within normal limits. Hepatic function appears to be within normal limits except for her albumin which is slightly low at 3.1. Troponin is within her normal range. Magnesium was low at 1.5 and was repleted here. CT of the chest showed stable bilateral nonocclusive pulmonary emboli, she does have bilateral pleural effusions. At this point I feel the leg swelling is more a function of her grade 1 diastolic heart failure, she was given Bumex here. Ativan really worked well for her as far as her pain and her anxiety. She was given another dose of that.   I told the patient and the  at bedside I do not think that her DVTs were worsening I think this is more a function of her heart failure however I would call the hospitalist.  I discussed case with them. They graciously excepted the admission. Patient is admitted in stable condition pending further evaluation and treatment. Addendum: Patient was ambulating to the bedside commode, when she had a slip and fall. X-rays here revealed she has a right intertrochanteric fracture, orthopedics was called Dr. Vamshi Michel was placed on consult. Patient was given pain medication. CRITICAL CARE:   None    CONSULTS:  Hospitalist  [facility] orthopedics    PROCEDURES:  None    FINAL IMPRESSION      1. Leg swelling    2. Acute diastolic heart failure ([address]    3. Supratherapeutic INR    4. Fall, initial encounter    5. Closed displaced intertrochanteric fracture of right femur, initial encounter ([facility] [address]          DISPOSITION/PLAN   Admission    PATIENT REFERRED TO:  No follow-up provider specified.     DISCHARGE MEDICATIONS:  New Prescriptions    No medications on file       (Please note that portions of this note were completed with a voice recognition program.  Efforts were made to edit the dictations but occasionally words are mis-transcribed.)    Etienne Velez, DO [place]ton Pump, DO  02/02/23 [address], DO  02/02/23 [address], DO  02/02/23 [id number]

## 2023-02-02 NOTE — H&P
Hospitalist History & Physical    Patient:  Clint Face    Unit/Bed:20/020A  YOB: 1950  MRN: [id number]   Acct: [de-identified]   PCP: Loli Montes MD  Code Status: Prior    Date of Service: Pt seen/examined on 02/02/23 and admitted to Inpatient with expected LOS greater than two midnights due to medical therapy. Chief Complaint: Leg swelling, nausea and vomiting    Assessment/Plan:    Query Acute on chronic diastolic HFpEF: Pro ; Patient was given bumex in ED will assess response and repeat as needed, Lasix and lisinopril just stopped prior to last discharge (1/29/23); 1+ pitting BLE edema, rhonchi and wheezing throughout lungs; CTA (2/1/23) showed new trace bilateral pleural effusions  Consult cardiology appreciate recommendations  Daily weights  Strict I/O    Stable PE and bilateral DVT: Patient still has non-occlusive PE; could have failed Xarelto therapy while outpatient, did miss Monday's evening dose;   Venous dopplers of BLE pending  Consider switching to heparin drip if considered failed Xarelto therapy  Elevate legs    Hypomagnesemia: Mag 1.5; Replacement given in ED  Daily BMP with mag  Telemetry    BLE edema: Likely due to DVTs vs CHF  Elevate BLE  PT/OT    Acute on chronic macrocytic anemia: HGB has been trending 8.8 since last admission and has remained stable; Iron panel studies performed on last admission  Transfuse for HGB < 7g/dL or hemodynamically unstable    CAD: History of stent placement-- continue ASA, BB, statin  Trend troponin x 3 times    IDDMII: HGB A1c 5.5 (1/24/23)  Continue home lantus  Continue medium dose SSI  POC glucose ac/hs    Elevated troponin: 0.027; likely due to PE  Trend troponin x 3 times    Chronic respiratory failure with tracheostomy, tracheal stenosis: At baseline oxygen needs    Obesity: BMI 38.41  Discussed and educated on lifestyle modifications    Essential hypertension: Currently under control at this time.     Query Constipation: Patient reports that she has not had a bowel movement \"for several days\" but also thinks it could be due to her lack of appetite. Possibly related to current heart failure. Does also have history of diverticulitis, WBC WNL, No reported melanic stools  Document stool consistency  KUB pending  Consider further imaging if patient maintains poor appetite in spite of treatment    Addendum: After initial assessment patient slipped and fell in the ED while trying to use bedside commode. CTs ordered by ED provider and orthopedics notified of event as well for possible intervention. History of Present Illness:  Jan Blas is a 81 y.o. female with PMHx of HFpEF, recent history of PE with BLE DVT, macrocytic anemia, CAD, IDDMII, chronic respiratory failure with tracheostomy, tracheal stenosis, obesity and essential hypertension who presented to [facility] with chief complaint of leg swelling, nausea and vomiting. Patient states that after recent discharge that she has been compliant with her anticoagulation medication and only missed 1 dose of Xarelto. She also endorses over the last couple of days of not having very good oral intake due to feeling nauseous even when just thinking about food. Patient does not vomit anything up other than clear foamy sputum, but also mentions that her legs have been swollen. She states that her stomach does not hurt however and denies any diarrhea. She did endorse having some possible constipation, but relates her lack of stools due to her decrease oral intake. She did deny any chest pain or increased shortness of breath. Review of Systems: Review of Systems   Constitutional:  Positive for appetite change. Negative for chills, diaphoresis, fatigue, fever and unexpected weight change. Respiratory:  Negative for cough, chest tightness, shortness of breath and wheezing. Cardiovascular:  Positive for leg swelling. Negative for chest pain and palpitations. Gastrointestinal:  Positive for abdominal pain, constipation and nausea. Negative for abdominal distention, anal bleeding, blood in stool and diarrhea. Genitourinary:  Negative for dysuria and flank pain. Neurological:  Negative for dizziness, weakness, light-headedness and headaches.        Past Medical History:        Diagnosis Date    Arthritis     Coronary artery disease     COVID     Hyperlipidemia     Primary hypertension     Type 2 diabetes mellitus ([facility] [address] 2018       Past Surgical History:        Procedure Laterality Date    CARDIAC SURGERY      CATARACT REMOVAL Bilateral      SECTION      3 times    CORONARY ANGIOPLASTY WITH STENT PLACEMENT      stenting twice    EYE SURGERY      HIATAL HERNIA REPAIR      late     HYSTERECTOMY, TOTAL ABDOMINAL (CERVIX REMOVED)      in     LARYNGOSCOPY N/A 3/22/2022    SUSPENSON LARYNGOSCOPY WITH JET VENT, DILATION performed by Patrick Maddox MD at [address] N/A 3/28/2022    SUSPENSION MICROLARYNGOSCOPY WITH BALLOON DILATION AND JET VENT, KENALOG INJECTION performed by Patrick Maddox MD at [address] N/A 2022    Suspension Laryngoscopy  Lateral of Right True Vocal Cord, With Steroid Injection of Larynx And Tracheostomy Tube Change, debridement performed by Robb Rubio MD at [address] N/A 8/10/2022    TRANSORAL LARYNGOPLASTY WITH LEFT PARTIAL ARYTENODECTOMY AND POSS LATERALIZATION, ADVANCEMENT FLAP RECONSTRUCTION WITH JET VENT,THERAPUTIC BRONCHOSCOPY WITH DEBRIEDMENT,WITH BALLOON DILITATION performed by Robb Rubio MD at [address] N/A 2022    Laryngoscopy with Dilation Debridement  Bronchoscopy with Dilation & Resection of Obstructing Tissues Transoral Laryngoplasty Left True Vocal Fold Lateralization left partial aryteniodectomy performed by Robb Rubio MD at [address]      in [address] 2022    TRACHEOTOMY TUBE REPLACEMENT performed by Merlinda Sayer, MD at [address] [facility] Medications:   No current facility-administered medications on file prior to encounter. Current Outpatient Medications on File Prior to Encounter   Medication Sig Dispense Refill    ondansetron (ZOFRAN-ODT) 4 MG disintegrating tablet Take 2 tablets by mouth every 8 hours as needed for Nausea or Vomiting 42 tablet 0    senna (SENOKOT) 8.6 MG tablet Take 1 tablet by mouth nightly 30 tablet 0    rivaroxaban 15 & 20 MG Starter Pack Take as directed on package. 1 each 0    insulin glargine (LANTUS SOLOSTAR) 100 UNIT/ML injection pen Inject 22 Units into the skin nightly 5 Adjustable Dose Pre-filled Pen Syringe 3    amoxicillin-clavulanate (AUGMENTIN) 875-125 MG per tablet Take 1 tablet by mouth 2 times daily for 10 days 20 tablet 0    sodium chloride flush 0.9 % injection Infuse 5-40 mLs intravenously 2 times daily Flush 10 mL twice daily. 1000 mL 5    tiZANidine (ZANAFLEX) 4 MG tablet       Benzocaine-Menthol (CEPACOL) 15-2.3 MG LOZG Take 1 lozenge by mouth every 3 hours as needed (sore throat, pain) 16 lozenge 1    Fexofenadine HCl (MUCINEX ALLERGY PO) Take 1 tablet by mouth 2 times daily Cuts in half to swallow easier      insulin aspart (NOVOLOG FLEXPEN) 100 UNIT/ML injection pen Inject 20-25 Units into the skin in the morning and 20-25 Units at noon and 20-25 Units in the evening. Inject before meals. (Patient taking differently: Inject 10 Units into the skin 2 times daily (before meals) 10 units with sliding scale at lunch and dinner) 22.5 mL 0    glucagon 1 MG injection Inject 1 mg into the muscle See Admin Instructions Follow package directions for low blood sugar. 1 kit 3    Insulin Pen Needle 31G X 6 MM MISC 1 each by Does not apply route daily 100 each 3    [DISCONTINUED] insulin lispro, 1 Unit Dial, (HUMALOG KWIKPEN) 100 UNIT/ML SOPN Inject 20-25 Units into the skin in the morning and 20-25 Units at noon and 20-25 Units in the evening. Inject before meals. 22.5 mL 0    albuterol (ACCUNEB) 0.63 MG/3ML nebulizer solution Take 1 ampule by nebulization every 6 hours as needed for Wheezing      lidocaine (XYLOCAINE) 4 % external solution Apply 1 Dose topically as needed for Pain Apply topically as needed. Cholecalciferol (VITAMIN D3 PO) Take by mouth daily 4000 units (Patient not taking: Reported on 1/21/2023)      NONFORMULARY daily resveratrol      guaiFENesin 400 MG tablet Take 400 mg by mouth 2 times daily as needed for Cough      [DISCONTINUED] insulin NPH (HUMULIN N;NOVOLIN N) 100 UNIT/ML injection vial Inject into the skin 2 times daily (before meals) Takes BID on the SS      metoprolol succinate (TOPROL XL) 25 MG extended release tablet Take 1 tablet by mouth daily 30 tablet 3    albuterol sulfate  (90 Base) MCG/ACT inhaler Inhale 2 puffs into the lungs every 6 hours as needed for Wheezing 18 g 3    OXYGEN Inhale 2 L/min into the lungs continuous prn (during activities such as walking) 1 Canister 5    rosuvastatin (CRESTOR) 10 MG tablet Take 10 mg by mouth daily      nitroGLYCERIN (NITROSTAT) 0.4 MG SL tablet Place 0.4 mg under the tongue every 5 minutes as needed for Chest pain up to max of 3 total doses. If no relief after 1 dose, call 911.       aspirin 81 MG chewable tablet Take 81 mg by mouth daily         Allergies:    Metformin and related, Codeine, Meperidine hcl, Sulfa antibiotics, and Sumatriptan    Social History:    reports that she has never smoked. She has never used smokeless tobacco. She reports that she does not currently use alcohol. She reports that she does not use drugs.     Family History:       Problem Relation Age of Onset    Parkinson's Disease Father     Lung Cancer Father        Diet:  No diet orders on file      Physical Exam:  /86   Pulse 91   Temp 98.2 °F (36.8 °C) (Oral)   Resp 17   Ht 5' 2\" (1.575 m)   Wt 210 lb (95.3 kg)   LMP  (LMP Unknown)   SpO2 99%   BMI 38.41 kg/m²   General: Chronically ill in appearance  HEENT:  normocephalic and atraumatic. No scleral icterus. PERR. Neck: supple. No JVD. No thyromegaly. Trach present  Lungs: Rhonchi and wheezing appreciated upon auscultation. No retractions  Cardiac: RRR without murmur. Abdomen: soft. Nontender. Bowel sounds positive. Extremities:  No clubbing or cyanosis. +1 pitting edema BLE   Vasculature: capillary refill < 3 seconds. Palpable LE pulses bilaterally. Skin:  warm and dry. No rashes or lesions. Psych:  Alert and oriented x3. Affect appropriate  Lymph:  No supraclavicular adenopathy. Neurologic:  No focal deficit. No seizures. Data: (All radiographs, tracings, PFTs, and imaging are personally viewed and interpreted unless otherwise noted)  Labs:   Recent Labs     02/01/23  2250   WBC 6.4   HGB 8.8*   HCT 26.7*        Recent Labs     02/01/23  2250      K 3.8      CO2 23   BUN 6*   CREATININE 0.7   CALCIUM 8.7     Recent Labs     02/01/23  2250   AST 14   ALT 12   BILIDIR <0.2   BILITOT 0.4   ALKPHOS 95     Recent Labs     02/01/23  2250   INR 3.83*     No results for input(s): Annita Major in the last 72 hours. Urinalysis:   Lab Results   Component Value Date/Time    NITRU NEGATIVE 01/21/2023 09:50 AM    WBCUA 10-15 01/21/2023 09:50 AM    BACTERIA NONE SEEN 01/21/2023 09:50 AM    RBCUA 0-2 01/21/2023 09:50 AM    BLOODU NEGATIVE 01/21/2023 09:50 AM    SPECGRAV 1.013 08/12/2022 12:30 PM    GLUCOSEU NEGATIVE 01/21/2023 09:50 AM       EKG:  Sinus tachycardia    Radiology:  CTA CHEST W WO CONTRAST   Final Result   Impression:   1. Stable bilateral nonocclusive pulmonary emboli. Negative for CT    evidence of right heart strain or thrombus propagation. No new pulmonary    emboli since the prior study 1/21/23. 2. New trace bilateral pleural effusions. 3. Nonemergent/incidental findings as above.       This document has been electronically signed by: Stephanie Ruiz MD on    02/02/2023 02:13 AM All CTs at this facility use dose modulation techniques and iterative    reconstructions, and/or weight-based dosing   when appropriate to reduce radiation to a low as reasonably achievable. 3D Post-processing was performed on this study. CTA CHEST W WO CONTRAST    Result Date: 2/2/2023  CTA Chest with contrast: PE Protocol. Indication: Shortness of breath. Recent PE. Technique: CTA chest with intravenous contrast. Coronal and sagittal reformations. MIP images. Comparison: CT/KO/SR - CTA CHEST W WO CONTRAST - 01/21/2023 09:34 AM EST Findings: Tracheostomy tube 4.8 cm above the telma. Right PICC line with tip in the lower SVC, new since prior study. Removal of the left PICC line. The intravenous contrast bolus adequately opacifies pulmonary arterial vasculature. Nonocclusive intraluminal filling defects in the right lower lobe segmental/subsegmental branches of the pulmonary artery (series 601 images ) as well as left lower lobe branch of the pulmonary artery (series 601 image 65-69), similar to previous study. No new areas of pulmonary embolus. New trace bilateral pleural effusions. . Mild interstitial thickening, similar to previous study. Stable right lower lobe nodule up to 0.5 cm. Respiratory motion artifacts and atelectasis. No bulky mediastinal, hilar, or axillary lymphadenopathy. Heart is enlarged in size. Stent versus calcification LAD. Coronary atherosclerotic calcifications. Trace pericardial effusion. Lipomatous hypertrophy of the interatrial septum. The thoracic aorta is normal in caliber, with mild atherosclerotic calcifications. Small hiatal hernia. Partially imaged upper abdomen: No upper abdominal ascites. Bones: Spondylosis. Stable sclerosis of the posterior left 9th rib. T12 and L1 vertebral hemangiomata. Impression: 1. Stable bilateral nonocclusive pulmonary emboli. Negative for CT evidence of right heart strain or thrombus propagation.  No new pulmonary emboli since the prior study 1/21/23. 2. New trace bilateral pleural effusions. 3. Nonemergent/incidental findings as above. This document has been electronically signed by: Santy Harvey MD on 02/02/2023 02:13 AM All CTs at this facility use dose modulation techniques and iterative reconstructions, and/or weight-based dosing when appropriate to reduce radiation to a low as reasonably achievable. 3D Post-processing was performed on this study. Tele:   [x] yes             [] no      Thank you Palomo Hayward MD for the opportunity to be involved in this patient's care.     Electronically signed by WYATT Love CNP on 2/2/2023 at 3:18 AM

## 2023-02-02 NOTE — ED NOTES
Patient resting in bed. Respirations easy and unlabored. No distress noted. Call light within reach.        Kanwal Antonio RN  02/02/23 [id number]

## 2023-02-02 NOTE — ED NOTES
Call placed to WYATT Nj regarding orders for nausea medication requested by the patient.      Lucy Godoy RN  02/02/23 9100

## 2023-02-02 NOTE — CARE COORDINATION
02/02/23 1157   Readmission Assessment   Number of Days since last admission? 1-7 days   Previous Disposition Home with Home Health   Who is being Interviewed Patient;Caregiver   What was the patient's/caregiver's perception as to why they think they needed to return back to the hospital? Other (Comment)  (CHF)   Did you visit your Primary Care Physician after you left the hospital, before you returned this time? Yes   Did you see a specialist, such as Cardiac, Pulmonary, Orthopedic Physician, etc. after you left the hospital? No   Who advised the patient to return to the hospital? [address] [place] [address] Staff   Does the patient report anything that got in the way of taking their medications? No   In our efforts to provide the best possible care to you and others like you, can you think of anything that we could have done to help you after you left the hospital the first time, so that you might not have needed to return so soon?  Other (Comment)  (unsure cause of abdominal pain)

## 2023-02-02 NOTE — CARE COORDINATION
02/02/23 1202   Service Assessment   Patient Orientation Alert and Oriented   Cognition Alert   History Provided By Patient;Spouse   Primary Caregiver Spouse   Accompanied By/Relationship spouse [address] Spouse/Significant Other   Patient's Alf Rodriguez is: Legal Next of Kin   PCP Verified by CM Yes   Last Visit to PCP Within last 3 months   Prior Functional Level Independent in ADLs/IADLs   Current Functional Level Independent in ADLs/IADLs   Ability to make needs known: Good   Family able to assist with home care needs: Yes   Would you like for me to discuss the discharge plan with any other family members/significant others, and if so, who? No   Financial Resources Medicare   Community Resources ECF/Home Care   CM/SW Referral ADLs/IADLs   Discharge Planning   Type of Residence House   Living Arrangements Spouse/Significant Other   Current Services Prior To Admission [facility]   Current DME Prior to [employer]; Wheelchair   Potential [address]   DME Ordered? No   Potential Assistance Purchasing Medications No   Type of Home Care Services Nursing Services;OT;PT;Skilled Therapy; Aide Services   Patient expects to be discharged to: Rehabilitation facility   Follow Up Appointment: Best Day/Time    (PM)   One/Two Story Residence One story   Services At/After Discharge   Transition of Care Consult (CM Consult) SNF   Services At/After Discharge Home Health;Skilled [address] (SNF);PT;OT   [employer] Information Provided? No   Confirm Follow Up Transport Family   Condition of Participation: Discharge Planning   The Plan for Transition of Care is related to the following treatment goals: CHF treatment   Freedom of Choice list was provided with basic dialogue that supports the patient's individualized plan of care/goals, treatment preferences, and shares the quality data associated with the providers?   No

## 2023-02-02 NOTE — ED NOTES
ED to inpatient nurses report    Chief Complaint   Patient presents with    Leg Swelling      Present to ED from home  LOC: alert and orientated to name, place, date  Vital signs   Vitals:    02/01/23 2211 02/01/23 2256   BP: (!) 159/92 129/76   Pulse: (!) 120 99   Resp: 26 18   Temp: 98.2 °F (36.8 °C)    TempSrc: Oral    SpO2: 100% 99%   Weight: 210 lb (95.3 kg)    Height: 5' 2\" (1.575 m)       Oxygen Baseline 0.25L/min    Current needs required 1L min Bipap/Cpap No  LDAs:    Mobility: Requires assistance * 2  Pending ED orders: none  Present condition: stable      Electronically signed by Sourav Fischer RN on 2/2/2023 at 12:00 AM        Sourav Fischer [place]  02/02/23 0001

## 2023-02-02 NOTE — ED NOTES
Report received from Nichelle G[facility].      Terri Robbins RN  02/02/23 7204

## 2023-02-02 NOTE — PROGRESS NOTES
Educated patient's  regarding heart failure management by explaining the importance of obtaining daily weights at the same time every day with approximately the same amount of clothing, how to recognize symptoms, low sodium diet and taking prescribed medications as ordered. Patient was given our heart failure booklet. Patient was receptive to the education given and verbalized understanding.

## 2023-02-02 NOTE — ED NOTES
Pt vitals collected. Pt resting in bed with eyes closed at this time. Pt respirations easy and unlabored.      Mychal Meraz RN  02/02/23 9947

## 2023-02-02 NOTE — CARE COORDINATION
DISCHARGE PLANNING EVALUATION  2/2/23, 11:03 AM EST    Reason for Referral: [facility]   Mental Status: Patient is asleep, spoke with   Decision Making: independent with help from   Family/Social/Home Environment: Lives at home with    Current Services including food security, transportation and housekeeping: Current [facility] for RN and PT. Current Equipment: permanent trach, walker, wheelchair  Payment Source: Medicare  Concerns or Barriers to Discharge: Patient would benefit from ECF or IPR stay  Post-acute [facility]) provider list was provided to patient. Patient was informed of their freedom to choose [facility] provider. Discussed and offered to show the patient the relevant [facility] Providers quality and resource use measures on Medicare Compare web site via computer based on patient's goals of care and treatment preferences. Questions regarding selection process were answered. Teach Back Method used with Kerri Mnedoza regarding care plan and discharge planning  Patient and  verbalize understanding of the plan of care and contribute to goal setting. Patient goals, treatment preferences and discharge plan: Plan pending progress at this time. Patient did fall in ER and break right hip, will require surgery.  is agreeable to some kind of rehab, such as ECF or IPR. For ECF, his first choice would be [facility] or [facility] but she has also been to [facility] before and done well following COVID. He would like to discuss with Thad Mark first what she would like to do. SERVANDO will continue to follow. Electronically signed by Zechariah Ochoa on 2/2/2023 at 11:03 AM       SERVANDO spoke with Lo at [place] and updated her on the situation. Will keep her informed following plans for discharge.

## 2023-02-02 NOTE — PROGRESS NOTES
Hospitalist Progress Note    Patient:  Efrain Szymanski      Unit/Bed:4K-24/024-A    YOB: 1950    MRN: [id number]       Acct: [de-identified]     PCP: Urban Amso MD    Date of Admission: 2/1/2023    Assessment/Plan:    Acute right intertrochanteric femur fracture  -Ortho consult  -Pain management w/ PRN morphine 1mg Q6  -Surgery scheduled  2/3/23 due to elevated INR. Supra therapeutic INR  -Xarelto withheld  - Vitamin K PO  -q24h INR  -1 unit FFP ordered and consent obtained  Stable PE  -CTA confirming b/l non-obstructive PEs   -Will resume anticoagulation after surgery  Subclinical Hypothyroidism  -Consider synthroid treatment  B/L LE edema   -Elevation for edema   -No compression due to DVTs  CAD  -Continue ASA, BB, statins  Acute on chronic macrocytic anemia   - Currently stable but will monitor and transfuse if Hgb drops below 7. IDDM2   -Continue lantus   -Continue medium dose SSI   -POC glucose checks  Elevated Troponin-   -Stable since 1/21/23  Obesity   -BMI 38.41   -Discuss lifestyle changes. Essential HTN   -Controlled            Expected discharge date:      Disposition:    [] Home       [] TCU       [x] Rehab       [] Psych       [] SNF       [] [place]       [] Other-    Chief Complaint: Lower extremity edema    Hospital Course:  Abi Webber is a 73yoF with PMH of recent PE and b/l DVT, macrocytic anemia, CAD, DM2, tracheostomy dependent, obesity, and HTN who presents for leg swelling, nausea, and constipation. She was recently discharged after an 8 day admission for DVTs and PEs. She was placed on Xarelto when discharged and stated that she has been compliant and only missed one dose. She returned due to chronic constipation, feeling ''bloated'', nausea, and LE edema. While being evaluated in the ED the pt had a mechanical fall and developed a R femur fracture. She was found to have supratheraputic INR at 3.83. CT head and C-spine both clear.     2/2/23  -Her pain is being managed with morphine and her Xarelto is being held. She was also given Vitamin K to reduce her INR. -Urology was consulted to place a werner due to concerns of urinary retention. Resource RN, and multiple other nurses, was unable to successfully place werner. Will f/u with Urology. 2/3/23  -Pt had improvement in INR but it was still elevated over 2. FFP was ordered and consent was obtained after discussing risks and benefits with patient and her .     -Pt will go to surgery today after INR improves. Subjective (past 24 hours): Patient complaining of R hip pain as her main complaint at this time. Pain management is effective at reducing pain. Discussed with pt and her  her current conditions and that the priority of decreasing her INR so that Ortho can manage her hip fracture. After that time we will resume her anticoagulation for her PEs and DVTs. They both agreed to the plan and were wanting to address the several weeks of constipation and nausea after she recovers from surgery. ROS (12 point review of systems completed. Pertinent positives noted. Otherwise ROS is negative).     Medications:  Reviewed    Infusion Medications    sodium chloride      sodium chloride       Scheduled Medications    aspirin  81 mg Oral Daily    insulin glargine  22 Units SubCUTAneous Nightly    metoprolol succinate  25 mg Oral Daily    rosuvastatin  10 mg Oral Daily    senna  1 tablet Oral Nightly    sodium chloride flush  5-40 mL IntraVENous 2 times per day    insulin lispro  0-8 Units SubCUTAneous TID WC    insulin lispro  0-4 Units SubCUTAneous Nightly    scopolamine  1 patch TransDERmal Q72H    [Held by provider] rivaroxaban  15 mg Oral BID WC    cephALEXin  500 mg Oral 4 times per day    furosemide  20 mg Oral Daily     PRN Meds: sodium chloride, albuterol sulfate HFA, Menthol, guaiFENesin, tiZANidine, sodium chloride flush, sodium chloride, polyethylene glycol, acetaminophen **OR** acetaminophen, potassium chloride **OR** potassium alternative oral replacement **OR** potassium chloride, magnesium sulfate, morphine, oxyCODONE **OR** oxyCODONE, hydrOXYzine      Intake/Output Summary (Last 24 hours) at 2/3/2023 1632  Last data filed at 2/3/2023 1436  Gross per 24 hour   Intake 740 ml   Output 1100 ml   Net -360 ml       Diet:  Diet NPO    Exam:  /62   Pulse 92   Temp 97.8 °F (36.6 °C) (Rectal)   Resp 12   Ht 5' 2\" (1.575 m)   Wt 210 lb (95.3 kg)   LMP  (LMP Unknown)   SpO2 92%   BMI 38.41 kg/m²     General appearance: No apparent distress, appears stated age and cooperative. HEENT: Pupils equal, round, and reactive to light. Conjunctivae/corneas clear. Neck: Supple, with full range of motion. No jugular venous distention. Trachea midline. Respiratory:  Via tracheostomy, Normal respiratory effort. Clear to auscultation, bilaterally without Rales/Wheezes/Rhonchi. Cardiovascular: Regular rate and rhythm with normal  Abdomen: Soft, mild periumbilical tenderness, non-distended  Musculoskeletal: R hip tenderness, B/l +2 pitting pedal edema  Skin: Skin color, texture, turgor normal.  No rashes or lesions. Neurologic:  Neurovascularly intact without any focal sensory/motor deficits.  Cranial nerves: II-XII intact, grossly non-focal.  Psychiatric: Alert and oriented, thought content appropriate, normal insight  Capillary Refill: Brisk,< 3 seconds   Peripheral Pulses: +2 palpable, equal bilaterally       Labs:   Recent Labs     02/01/23 2250 02/03/23 0418   WBC 6.4 7.6   HGB 8.8* 9.0*   HCT 26.7* 29.1*    289     Recent Labs     02/01/23  2250 02/02/23  0810 02/03/23  0418    142 140   K 3.8 3.7 4.5    101 103   CO2 23 23 27   BUN 6* 4* 7   CREATININE 0.7 0.7 0.7   CALCIUM 8.7 8.0* 8.2*     Recent Labs     02/01/23 2250   AST 14   ALT 12   BILIDIR <0.2   BILITOT 0.4   ALKPHOS 95     Recent Labs     02/01/23  2250 02/02/23  0820 02/03/23  0418   INR 3.83* 3.48* 2.47* No results for input(s): Genaro Brooke in the last 72 hours. Microbiology:      Urinalysis:      Lab Results   Component Value Date/Time    NITRU NEGATIVE 02/02/2023 05:10 PM    WBCUA 25-50 02/02/2023 02:10 PM    BACTERIA FEW 02/02/2023 02:10 PM    RBCUA 5-10 02/02/2023 02:10 PM    BLOODU NEGATIVE 02/02/2023 05:10 PM    SPECGRAV >1.030 02/02/2023 02:10 PM    GLUCOSEU NEGATIVE 02/02/2023 05:10 PM       Radiology:  XR FEMUR RIGHT (MIN 2 VIEWS)   Final Result   Intraoperative imaging demonstrating open reduction internal fixation right hip            **This report has been created using voice recognition software. It may contain minor errors which are inherent in voice recognition technology. **      Final report electronically signed by Dr. Oscar Borges on 2/3/2023 3:02 PM      FLUORO FOR SURGICAL PROCEDURES   Final Result      XR CHEST PORTABLE   Final Result   1. Marked cardiomegaly. Right arm PICC line, catheter tip at cavoatrial projection. . Tracheostomy tube in place. 2. No acute findings. No infiltrates or effusions are seen. **This report has been created using voice recognition software. It may contain minor errors which are inherent in voice recognition technology. **      Final report electronically signed by Dr. Dolly Pinzon on 2/2/2023 2:20 PM      XR ABDOMEN (KUB) (SINGLE AP VIEW)   Final Result   Impression:    Resolved constipation. Right femoral intertrochanteric fracture. This document has been electronically signed by: Cody John on    02/02/2023 05:35 AM      CT CERVICAL SPINE WO CONTRAST   Final Result    No fracture. **This report has been created using voice recognition software. It may contain minor errors which are inherent in voice recognition technology. **      Final report electronically signed by Dr. Heydi Tong on 2/2/2023 7:38 AM      CT HEAD WO CONTRAST   Final Result    No evidence of an acute process.                **This report has been created using voice recognition software. It may contain minor errors which are inherent in voice recognition technology. **      Final report electronically signed by Dr. Nixon Lau on 2/2/2023 7:25 AM      XR HIP 2-3 VW W PELVIS RIGHT   Final Result   Impression:   Acute right femoral intertrochanteric fracture, with subtrochanteric    extension. This document has been electronically signed by: Belgica Arredondo MD on    02/02/2023 05:01 AM      XR CHEST 1 VIEW   Final Result   Impression:   No acute cardiopulmonary disease. Cardiomegaly. This document has been electronically signed by: Belgica Arredondo MD on    02/02/2023 05:02 AM      CTA CHEST W WO CONTRAST   Final Result   Impression:   1. Stable bilateral nonocclusive pulmonary emboli. Negative for CT    evidence of right heart strain or thrombus propagation. No new pulmonary    emboli since the prior study 1/21/23. 2. New trace bilateral pleural effusions. 3. Nonemergent/incidental findings as above. This document has been electronically signed by: Belgica Arredondo MD on    02/02/2023 02:13 AM      All CTs at this facility use dose modulation techniques and iterative    reconstructions, and/or weight-based dosing   when appropriate to reduce radiation to a low as reasonably achievable. 3D Post-processing was performed on this study.           DVT prophylaxis: [] Lovenox                                 [] SCDs                                 [] SQ Heparin                                 [] Encourage ambulation           [] Already on Anticoagulation     Code Status: Full Code    PT/OT Eval Status:     Tele:   [] yes             [x] no    Electronically signed by Ashley Varela MD on 2/3/2023 at 4:32 PM

## 2023-02-02 NOTE — ED NOTES
Patient resting in bed. Respirations easy and unlabored. No distress noted. Call light within reach.        Noe Hyman RN  02/02/23 7100

## 2023-02-03 ENCOUNTER — ANESTHESIA (OUTPATIENT)
Dept: OPERATING ROOM | Age: 73
End: 2023-02-03
Payer: MEDICARE

## 2023-02-03 ENCOUNTER — APPOINTMENT (OUTPATIENT)
Dept: GENERAL RADIOLOGY | Age: 73
End: 2023-02-03
Payer: MEDICARE

## 2023-02-03 ENCOUNTER — ANESTHESIA EVENT (OUTPATIENT)
Dept: OPERATING ROOM | Age: 73
End: 2023-02-03
Payer: MEDICARE

## 2023-02-03 PROBLEM — S72.141A CLOSED DISPLACED INTERTROCHANTERIC FRACTURE OF RIGHT FEMUR (HCC): Status: ACTIVE | Noted: 2023-02-03

## 2023-02-03 LAB
ABO GROUP BLD: NORMAL
ANION GAP SERPL CALC-SCNC: 10 MEQ/L (ref 8–16)
BASOPHILS ABSOLUTE: 0.1 THOU/MM3 (ref 0–0.1)
BASOPHILS NFR BLD AUTO: 0.7 %
BUN SERPL-MCNC: 7 MG/DL (ref 7–22)
CALCIUM SERPL-MCNC: 8.2 MG/DL (ref 8.5–10.5)
CHLORIDE SERPL-SCNC: 103 MEQ/L (ref 98–111)
CO2 SERPL-SCNC: 27 MEQ/L (ref 23–33)
CREAT SERPL-MCNC: 0.7 MG/DL (ref 0.4–1.2)
DEPRECATED RDW RBC AUTO: 58 FL (ref 35–45)
EOSINOPHIL NFR BLD AUTO: 0.9 %
EOSINOPHILS ABSOLUTE: 0.1 THOU/MM3 (ref 0–0.4)
ERYTHROCYTE [DISTWIDTH] IN BLOOD BY AUTOMATED COUNT: 15.7 % (ref 11.5–14.5)
GFR SERPL CREATININE-BSD FRML MDRD: > 60 ML/MIN/1.73M2
GLUCOSE BLD STRIP.AUTO-MCNC: 119 MG/DL (ref 70–108)
GLUCOSE BLD STRIP.AUTO-MCNC: 123 MG/DL (ref 70–108)
GLUCOSE BLD STRIP.AUTO-MCNC: 149 MG/DL (ref 70–108)
GLUCOSE BLD STRIP.AUTO-MCNC: 234 MG/DL (ref 70–108)
GLUCOSE SERPL-MCNC: 102 MG/DL (ref 70–108)
HCT VFR BLD AUTO: 29.1 % (ref 37–47)
HGB BLD-MCNC: 9 GM/DL (ref 12–16)
IAT IGG-SP REAG SERPL QL: NORMAL
IMM GRANULOCYTES # BLD AUTO: 0.19 THOU/MM3 (ref 0–0.07)
IMM GRANULOCYTES NFR BLD AUTO: 2.5 %
INR PPP: 2.47 (ref 0.85–1.13)
LYMPHOCYTES ABSOLUTE: 1.4 THOU/MM3 (ref 1–4.8)
LYMPHOCYTES NFR BLD AUTO: 18.7 %
MAGNESIUM SERPL-MCNC: 1.8 MG/DL (ref 1.6–2.4)
MCH RBC QN AUTO: 31.6 PG (ref 26–33)
MCHC RBC AUTO-ENTMCNC: 30.9 GM/DL (ref 32.2–35.5)
MCV RBC AUTO: 102.1 FL (ref 81–99)
MONOCYTES ABSOLUTE: 0.7 THOU/MM3 (ref 0.4–1.3)
MONOCYTES NFR BLD AUTO: 9.7 %
NEUTROPHILS NFR BLD AUTO: 67.5 %
NRBC BLD AUTO-RTO: 1 /100 WBC
PLATELET # BLD AUTO: 289 THOU/MM3 (ref 130–400)
PMV BLD AUTO: 9.2 FL (ref 9.4–12.4)
POTASSIUM SERPL-SCNC: 4.5 MEQ/L (ref 3.5–5.2)
RBC # BLD AUTO: 2.85 MILL/MM3 (ref 4.2–5.4)
RH BLD: NORMAL
SEGMENTED NEUTROPHILS ABSOLUTE COUNT: 5.1 THOU/MM3 (ref 1.8–7.7)
SODIUM SERPL-SCNC: 140 MEQ/L (ref 135–145)
WBC # BLD AUTO: 7.6 THOU/MM3 (ref 4.8–10.8)

## 2023-02-03 PROCEDURE — 7100000001 HC PACU RECOVERY - ADDTL 15 MIN: Performed by: ORTHOPAEDIC SURGERY

## 2023-02-03 PROCEDURE — 6360000002 HC RX W HCPCS: Performed by: ORTHOPAEDIC SURGERY

## 2023-02-03 PROCEDURE — 2580000003 HC RX 258: Performed by: EMERGENCY MEDICINE

## 2023-02-03 PROCEDURE — 86885 COOMBS TEST INDIRECT QUAL: CPT

## 2023-02-03 PROCEDURE — 73552 X-RAY EXAM OF FEMUR 2/>: CPT

## 2023-02-03 PROCEDURE — 2700000000 HC OXYGEN THERAPY PER DAY

## 2023-02-03 PROCEDURE — 3209999900 FLUORO FOR SURGICAL PROCEDURES

## 2023-02-03 PROCEDURE — 86923 COMPATIBILITY TEST ELECTRIC: CPT

## 2023-02-03 PROCEDURE — 6370000000 HC RX 637 (ALT 250 FOR IP): Performed by: INTERNAL MEDICINE

## 2023-02-03 PROCEDURE — 2500000003 HC RX 250 WO HCPCS

## 2023-02-03 PROCEDURE — 2580000003 HC RX 258: Performed by: ORTHOPAEDIC SURGERY

## 2023-02-03 PROCEDURE — 36415 COLL VENOUS BLD VENIPUNCTURE: CPT

## 2023-02-03 PROCEDURE — 99231 SBSQ HOSP IP/OBS SF/LOW 25: CPT | Performed by: UROLOGY

## 2023-02-03 PROCEDURE — 6360000002 HC RX W HCPCS: Performed by: NURSE PRACTITIONER

## 2023-02-03 PROCEDURE — 3700000001 HC ADD 15 MINUTES (ANESTHESIA): Performed by: ORTHOPAEDIC SURGERY

## 2023-02-03 PROCEDURE — 6370000000 HC RX 637 (ALT 250 FOR IP): Performed by: NURSE PRACTITIONER

## 2023-02-03 PROCEDURE — 99222 1ST HOSP IP/OBS MODERATE 55: CPT | Performed by: INTERNAL MEDICINE

## 2023-02-03 PROCEDURE — 80048 BASIC METABOLIC PNL TOTAL CA: CPT

## 2023-02-03 PROCEDURE — 6370000000 HC RX 637 (ALT 250 FOR IP): Performed by: STUDENT IN AN ORGANIZED HEALTH CARE EDUCATION/TRAINING PROGRAM

## 2023-02-03 PROCEDURE — 7100000000 HC PACU RECOVERY - FIRST 15 MIN: Performed by: ORTHOPAEDIC SURGERY

## 2023-02-03 PROCEDURE — 2580000003 HC RX 258: Performed by: NURSE PRACTITIONER

## 2023-02-03 PROCEDURE — 3700000000 HC ANESTHESIA ATTENDED CARE: Performed by: ORTHOPAEDIC SURGERY

## 2023-02-03 PROCEDURE — 2720000010 HC SURG SUPPLY STERILE: Performed by: ORTHOPAEDIC SURGERY

## 2023-02-03 PROCEDURE — 2060000000 HC ICU INTERMEDIATE R&B

## 2023-02-03 PROCEDURE — 85610 PROTHROMBIN TIME: CPT

## 2023-02-03 PROCEDURE — 3600000004 HC SURGERY LEVEL 4 BASE: Performed by: ORTHOPAEDIC SURGERY

## 2023-02-03 PROCEDURE — C1713 ANCHOR/SCREW BN/BN,TIS/BN: HCPCS | Performed by: ORTHOPAEDIC SURGERY

## 2023-02-03 PROCEDURE — 0QS606Z REPOSITION RIGHT UPPER FEMUR WITH INTRAMEDULLARY INTERNAL FIXATION DEVICE, OPEN APPROACH: ICD-10-PCS | Performed by: ORTHOPAEDIC SURGERY

## 2023-02-03 PROCEDURE — 2580000003 HC RX 258

## 2023-02-03 PROCEDURE — 3600000014 HC SURGERY LEVEL 4 ADDTL 15MIN: Performed by: ORTHOPAEDIC SURGERY

## 2023-02-03 PROCEDURE — 86901 BLOOD TYPING SEROLOGIC RH(D): CPT

## 2023-02-03 PROCEDURE — 83735 ASSAY OF MAGNESIUM: CPT

## 2023-02-03 PROCEDURE — 82948 REAGENT STRIP/BLOOD GLUCOSE: CPT

## 2023-02-03 PROCEDURE — 2709999900 HC NON-CHARGEABLE SUPPLY: Performed by: ORTHOPAEDIC SURGERY

## 2023-02-03 PROCEDURE — 94761 N-INVAS EAR/PLS OXIMETRY MLT: CPT

## 2023-02-03 PROCEDURE — 6360000002 HC RX W HCPCS: Performed by: PHYSICIAN ASSISTANT

## 2023-02-03 PROCEDURE — 2500000003 HC RX 250 WO HCPCS: Performed by: ORTHOPAEDIC SURGERY

## 2023-02-03 PROCEDURE — 6370000000 HC RX 637 (ALT 250 FOR IP)

## 2023-02-03 PROCEDURE — 6360000002 HC RX W HCPCS

## 2023-02-03 PROCEDURE — 36430 TRANSFUSION BLD/BLD COMPNT: CPT

## 2023-02-03 PROCEDURE — 86900 BLOOD TYPING SEROLOGIC ABO: CPT

## 2023-02-03 PROCEDURE — 6360000002 HC RX W HCPCS: Performed by: INTERNAL MEDICINE

## 2023-02-03 PROCEDURE — 85025 COMPLETE CBC W/AUTO DIFF WBC: CPT

## 2023-02-03 PROCEDURE — P9017 PLASMA 1 DONOR FRZ W/IN 8 HR: HCPCS

## 2023-02-03 PROCEDURE — 6360000002 HC RX W HCPCS: Performed by: NURSE ANESTHETIST, CERTIFIED REGISTERED

## 2023-02-03 DEVICE — TRIGEN LOW PROFILE SCREW 5.0MM X 37.5MM
Type: IMPLANTABLE DEVICE | Site: HIP | Status: FUNCTIONAL
Brand: TRIGEN

## 2023-02-03 DEVICE — TRIGEN INTERTAN 10S 10MM X 36CM 125DEGREE RIGHT
Type: IMPLANTABLE DEVICE | Site: HIP | Status: FUNCTIONAL
Brand: TRIGEN

## 2023-02-03 DEVICE — INTERTAN LAG/COMPRESSION SCREW KIT                                    85MM / 80MM
Type: IMPLANTABLE DEVICE | Site: HIP | Status: FUNCTIONAL
Brand: TRIGEN

## 2023-02-03 RX ORDER — METHOCARBAMOL 750 MG/1
TABLET ORAL
Status: ON HOLD | COMMUNITY
Start: 2022-12-06

## 2023-02-03 RX ORDER — PANTOPRAZOLE SODIUM 40 MG/1
TABLET, DELAYED RELEASE ORAL
Status: ON HOLD | COMMUNITY
Start: 2022-11-29

## 2023-02-03 RX ORDER — ONDANSETRON 2 MG/ML
4 INJECTION INTRAMUSCULAR; INTRAVENOUS
Status: DISCONTINUED | OUTPATIENT
Start: 2023-02-03 | End: 2023-02-03 | Stop reason: HOSPADM

## 2023-02-03 RX ORDER — SODIUM CHLORIDE 9 MG/ML
INJECTION, SOLUTION INTRAVENOUS PRN
Status: DISCONTINUED | OUTPATIENT
Start: 2023-02-03 | End: 2023-02-10 | Stop reason: HOSPADM

## 2023-02-03 RX ORDER — ASPIRIN 81 MG/1
81 TABLET, CHEWABLE ORAL DAILY
Status: DISCONTINUED | OUTPATIENT
Start: 2023-02-04 | End: 2023-02-10 | Stop reason: HOSPADM

## 2023-02-03 RX ORDER — FENTANYL CITRATE 50 UG/ML
25 INJECTION, SOLUTION INTRAMUSCULAR; INTRAVENOUS EVERY 5 MIN PRN
Status: DISCONTINUED | OUTPATIENT
Start: 2023-02-03 | End: 2023-02-03 | Stop reason: HOSPADM

## 2023-02-03 RX ORDER — TRANEXAMIC ACID 100 MG/ML
INJECTION, SOLUTION INTRAVENOUS PRN
Status: DISCONTINUED | OUTPATIENT
Start: 2023-02-03 | End: 2023-02-03 | Stop reason: SDUPTHER

## 2023-02-03 RX ORDER — PROPOFOL 10 MG/ML
INJECTION, EMULSION INTRAVENOUS PRN
Status: DISCONTINUED | OUTPATIENT
Start: 2023-02-03 | End: 2023-02-03 | Stop reason: SDUPTHER

## 2023-02-03 RX ORDER — FENTANYL CITRATE 50 UG/ML
50 INJECTION, SOLUTION INTRAMUSCULAR; INTRAVENOUS EVERY 5 MIN PRN
Status: DISCONTINUED | OUTPATIENT
Start: 2023-02-03 | End: 2023-02-03 | Stop reason: HOSPADM

## 2023-02-03 RX ORDER — SODIUM CHLORIDE 0.9 % (FLUSH) 0.9 %
5-40 SYRINGE (ML) INJECTION PRN
Status: DISCONTINUED | OUTPATIENT
Start: 2023-02-03 | End: 2023-02-03 | Stop reason: HOSPADM

## 2023-02-03 RX ORDER — ONDANSETRON 2 MG/ML
INJECTION INTRAMUSCULAR; INTRAVENOUS PRN
Status: DISCONTINUED | OUTPATIENT
Start: 2023-02-03 | End: 2023-02-03 | Stop reason: SDUPTHER

## 2023-02-03 RX ORDER — LABETALOL HYDROCHLORIDE 5 MG/ML
10 INJECTION, SOLUTION INTRAVENOUS
Status: DISCONTINUED | OUTPATIENT
Start: 2023-02-03 | End: 2023-02-03 | Stop reason: HOSPADM

## 2023-02-03 RX ORDER — LORAZEPAM 2 MG/ML
0.5 INJECTION INTRAMUSCULAR
Status: DISCONTINUED | OUTPATIENT
Start: 2023-02-03 | End: 2023-02-03 | Stop reason: HOSPADM

## 2023-02-03 RX ORDER — SODIUM CHLORIDE 9 MG/ML
INJECTION, SOLUTION INTRAVENOUS PRN
Status: COMPLETED | OUTPATIENT
Start: 2023-02-03 | End: 2023-02-03

## 2023-02-03 RX ORDER — SODIUM CHLORIDE 9 MG/ML
INJECTION, SOLUTION INTRAVENOUS PRN
Status: DISCONTINUED | OUTPATIENT
Start: 2023-02-03 | End: 2023-02-03 | Stop reason: HOSPADM

## 2023-02-03 RX ORDER — HYDROCODONE BITARTRATE AND ACETAMINOPHEN 5; 325 MG/1; MG/1
1 TABLET ORAL
Qty: 42 TABLET | Refills: 0 | Status: ON HOLD | OUTPATIENT
Start: 2023-02-03 | End: 2023-02-10

## 2023-02-03 RX ORDER — DROPERIDOL 2.5 MG/ML
0.62 INJECTION, SOLUTION INTRAMUSCULAR; INTRAVENOUS
Status: DISCONTINUED | OUTPATIENT
Start: 2023-02-03 | End: 2023-02-03 | Stop reason: HOSPADM

## 2023-02-03 RX ORDER — SODIUM CHLORIDE 0.9 % (FLUSH) 0.9 %
5-40 SYRINGE (ML) INJECTION EVERY 12 HOURS SCHEDULED
Status: DISCONTINUED | OUTPATIENT
Start: 2023-02-03 | End: 2023-02-03 | Stop reason: HOSPADM

## 2023-02-03 RX ORDER — TRAMADOL HYDROCHLORIDE 50 MG/1
TABLET ORAL
Status: ON HOLD | COMMUNITY
Start: 2023-01-30

## 2023-02-03 RX ORDER — FENTANYL CITRATE 50 UG/ML
INJECTION, SOLUTION INTRAMUSCULAR; INTRAVENOUS PRN
Status: DISCONTINUED | OUTPATIENT
Start: 2023-02-03 | End: 2023-02-03 | Stop reason: SDUPTHER

## 2023-02-03 RX ORDER — NYSTATIN 100000 [USP'U]/G
POWDER TOPICAL
Status: ON HOLD | COMMUNITY
Start: 2022-12-09

## 2023-02-03 RX ORDER — IPRATROPIUM BROMIDE AND ALBUTEROL SULFATE 2.5; .5 MG/3ML; MG/3ML
1 SOLUTION RESPIRATORY (INHALATION)
Status: DISCONTINUED | OUTPATIENT
Start: 2023-02-03 | End: 2023-02-03 | Stop reason: HOSPADM

## 2023-02-03 RX ORDER — DEXAMETHASONE SODIUM PHOSPHATE 10 MG/ML
INJECTION, EMULSION INTRAMUSCULAR; INTRAVENOUS PRN
Status: DISCONTINUED | OUTPATIENT
Start: 2023-02-03 | End: 2023-02-03 | Stop reason: SDUPTHER

## 2023-02-03 RX ORDER — FENTANYL CITRATE 50 UG/ML
INJECTION, SOLUTION INTRAMUSCULAR; INTRAVENOUS
Status: COMPLETED
Start: 2023-02-03 | End: 2023-02-03

## 2023-02-03 RX ORDER — ACETAMINOPHEN 325 MG/1
650 TABLET ORAL ONCE
Status: DISCONTINUED | OUTPATIENT
Start: 2023-02-03 | End: 2023-02-03 | Stop reason: HOSPADM

## 2023-02-03 RX ORDER — DIPHENHYDRAMINE HYDROCHLORIDE 50 MG/ML
12.5 INJECTION INTRAMUSCULAR; INTRAVENOUS
Status: DISCONTINUED | OUTPATIENT
Start: 2023-02-03 | End: 2023-02-03 | Stop reason: HOSPADM

## 2023-02-03 RX ADMIN — Medication 2000 MG: at 14:09

## 2023-02-03 RX ADMIN — OXYCODONE 5 MG: 5 TABLET ORAL at 08:19

## 2023-02-03 RX ADMIN — SODIUM CHLORIDE, PRESERVATIVE FREE 10 ML: 5 INJECTION INTRAVENOUS at 21:09

## 2023-02-03 RX ADMIN — CEPHALEXIN 500 MG: 500 CAPSULE ORAL at 12:10

## 2023-02-03 RX ADMIN — CEPHALEXIN 500 MG: 500 CAPSULE ORAL at 23:36

## 2023-02-03 RX ADMIN — FENTANYL CITRATE 50 MCG: 50 INJECTION, SOLUTION INTRAMUSCULAR; INTRAVENOUS at 15:00

## 2023-02-03 RX ADMIN — SENNOSIDES 8.6 MG: 8.6 TABLET, FILM COATED ORAL at 21:09

## 2023-02-03 RX ADMIN — FENTANYL CITRATE 50 MCG: 50 INJECTION, SOLUTION INTRAMUSCULAR; INTRAVENOUS at 13:59

## 2023-02-03 RX ADMIN — OXYCODONE 5 MG: 5 TABLET ORAL at 21:09

## 2023-02-03 RX ADMIN — SODIUM CHLORIDE: 9 INJECTION, SOLUTION INTRAVENOUS at 13:50

## 2023-02-03 RX ADMIN — INSULIN GLARGINE 22 UNITS: 100 INJECTION, SOLUTION SUBCUTANEOUS at 21:08

## 2023-02-03 RX ADMIN — PROPOFOL 50 MG: 10 INJECTION, EMULSION INTRAVENOUS at 14:16

## 2023-02-03 RX ADMIN — Medication 2000 MG: at 21:16

## 2023-02-03 RX ADMIN — TRANEXAMIC ACID 1000 MG: 100 INJECTION, SOLUTION INTRAVENOUS at 14:25

## 2023-02-03 RX ADMIN — ONDANSETRON 4 MG: 2 INJECTION INTRAMUSCULAR; INTRAVENOUS at 14:11

## 2023-02-03 RX ADMIN — PHENYLEPHRINE HYDROCHLORIDE 100 MCG: 10 INJECTION INTRAVENOUS at 14:32

## 2023-02-03 RX ADMIN — PHENYLEPHRINE HYDROCHLORIDE 100 MCG: 10 INJECTION INTRAVENOUS at 14:29

## 2023-02-03 RX ADMIN — MORPHINE SULFATE 1 MG: 2 INJECTION, SOLUTION INTRAMUSCULAR; INTRAVENOUS at 05:54

## 2023-02-03 RX ADMIN — DEXAMETHASONE SODIUM PHOSPHATE 10 MG: 10 INJECTION, EMULSION INTRAMUSCULAR; INTRAVENOUS at 14:08

## 2023-02-03 RX ADMIN — ROSUVASTATIN 10 MG: 10 TABLET, FILM COATED ORAL at 08:19

## 2023-02-03 RX ADMIN — ASPIRIN 81 MG 81 MG: 81 TABLET ORAL at 08:19

## 2023-02-03 RX ADMIN — SODIUM CHLORIDE, PRESERVATIVE FREE 10 ML: 5 INJECTION INTRAVENOUS at 08:21

## 2023-02-03 RX ADMIN — FUROSEMIDE 20 MG: 20 TABLET ORAL at 08:19

## 2023-02-03 RX ADMIN — MORPHINE SULFATE 1 MG: 2 INJECTION, SOLUTION INTRAMUSCULAR; INTRAVENOUS at 23:36

## 2023-02-03 ASSESSMENT — PAIN DESCRIPTION - DESCRIPTORS
DESCRIPTORS: ACHING

## 2023-02-03 ASSESSMENT — PAIN DESCRIPTION - LOCATION
LOCATION: HIP

## 2023-02-03 ASSESSMENT — PAIN - FUNCTIONAL ASSESSMENT
PAIN_FUNCTIONAL_ASSESSMENT: PREVENTS OR INTERFERES SOME ACTIVE ACTIVITIES AND ADLS

## 2023-02-03 ASSESSMENT — PAIN SCALES - GENERAL
PAINLEVEL_OUTOF10: 8
PAINLEVEL_OUTOF10: 10
PAINLEVEL_OUTOF10: 7
PAINLEVEL_OUTOF10: 8
PAINLEVEL_OUTOF10: 0
PAINLEVEL_OUTOF10: 0
PAINLEVEL_OUTOF10: 10
PAINLEVEL_OUTOF10: 7
PAINLEVEL_OUTOF10: 0
PAINLEVEL_OUTOF10: 7

## 2023-02-03 ASSESSMENT — ENCOUNTER SYMPTOMS: SHORTNESS OF BREATH: 1

## 2023-02-03 ASSESSMENT — PAIN DESCRIPTION - ORIENTATION
ORIENTATION: RIGHT

## 2023-02-03 ASSESSMENT — PAIN DESCRIPTION - FREQUENCY
FREQUENCY: CONTINUOUS
FREQUENCY: CONTINUOUS
FREQUENCY: INTERMITTENT

## 2023-02-03 ASSESSMENT — PAIN DESCRIPTION - PAIN TYPE
TYPE: ACUTE PAIN
TYPE: ACUTE PAIN;SURGICAL PAIN
TYPE: ACUTE PAIN
TYPE: SURGICAL PAIN;ACUTE PAIN

## 2023-02-03 ASSESSMENT — PAIN DESCRIPTION - ONSET: ONSET: ON-GOING

## 2023-02-03 ASSESSMENT — PAIN SCALES - WONG BAKER: WONGBAKER_NUMERICALRESPONSE: 0

## 2023-02-03 NOTE — PLAN OF CARE
Problem: Discharge Planning  Goal: Discharge to home or other facility with appropriate resources  Outcome: Progressing     Problem: Safety - Adult  Goal: Free from fall injury  Outcome: Progressing     Problem: Skin/Tissue Integrity  Goal: Absence of new skin breakdown  Description: 1. Monitor for areas of redness and/or skin breakdown  2. Assess vascular access sites hourly  3. Every 4-6 hours minimum:  Change oxygen saturation probe site  4. Every 4-6 hours:  If on nasal continuous positive airway pressure, respiratory therapy assess nares and determine need for appliance change or resting period.   Outcome: Progressing     Problem: Neurosensory - Adult  Goal: Achieves stable or improved neurological status  Outcome: Progressing     Problem: Neurosensory - Adult  Goal: Achieves maximal functionality and self care  Outcome: Progressing     Problem: Respiratory - Adult  Goal: Achieves optimal ventilation and oxygenation  2/3/2023 1631 by Raffi Juarez RN  Outcome: Progressing     Problem: Cardiovascular - Adult  Goal: Maintains optimal cardiac output and hemodynamic stability  Outcome: Progressing     Problem: Pain  Goal: Verbalizes/displays adequate comfort level or baseline comfort level  Outcome: Progressing     Problem: Chronic Conditions and Co-morbidities  Goal: Patient's chronic conditions and co-morbidity symptoms are monitored and maintained or improved  Outcome: Progressing     Problem: Genitourinary - Adult  Goal: Absence of urinary retention  Outcome: Progressing     Problem: Genitourinary - Adult  Goal: Urinary catheter remains patent  Outcome: Progressing     Problem: Infection - Adult  Goal: Absence of infection at discharge  Outcome: Progressing     Problem: Infection - Adult  Goal: Absence of infection at discharge  Outcome: Progressing     Problem: Infection - Adult  Goal: Absence of infection during hospitalization  Outcome: Progressing

## 2023-02-03 NOTE — PROGRESS NOTES
Urology Progress Note    Reason for Consult:  Urinary retention and werner placement  Requesting Physician:  Dr. Popeye Drake     History Obtained From:  patient     Chief Complaint: Leg swelling, n/v    Subjective: \"    Patient is resting in bed, voiding yellow concentrated urine via werner  Asleep, family at bedside    Plan:  Getting FFP to aid in INR  Ortho planning for right femur IMN with Dr Herminio Scruggs    Acute on possibly chronic urinary retention-pt reports hx of urinary retention in the past.  Recent UTIs difficult to resolve. ? Possible chronic component. Werner placed with some difficulty secondary to soft tissue swelling and positioning from R hip fx. Ua sent after werner looks clean, on Keflex for 3 days       Given difficulty of placement and concern for possible chronic component recommend continuing werner catheter until patient mobile, having good BMs, diuresis completed and soft tissue swelling improved. Will need urology follow up after discharge.         Vitals:  /63   Pulse 100   Temp 99.3 °F (37.4 °C) (Oral)   Resp 18   Ht 5' 2\" (1.575 m)   Wt 210 lb (95.3 kg)   LMP  (LMP Unknown)   SpO2 95%   BMI 38.41 kg/m²   Temp  Av.1 °F (37.3 °C)  Min: 98.8 °F (37.1 °C)  Max: 99.3 °F (37.4 °C)    Intake/Output Summary (Last 24 hours) at 2/3/2023 1011  Last data filed at 2/3/2023 0315  Gross per 24 hour   Intake 50 ml   Output 1500 ml   Net -1450 ml       Social History     Socioeconomic History    Marital status:      Spouse name: bailee     Number of children: 3    Years of education: Not on file    Highest education level: Not on file   Occupational History    Not on file   Tobacco Use    Smoking status: Never    Smokeless tobacco: Never   Vaping Use    Vaping Use: Never used    Passive vaping exposure: Yes   Substance and Sexual Activity    Alcohol use: Not Currently     Comment: drinks 1 glass of wine cooler or wine about 3 times per year    Drug use: Never    Sexual activity: Not Currently   Other Topics Concern    Not on file   Social History Narrative    Not on file     Social Determinants of Health     Financial Resource Strain: Not on file   Food Insecurity: Not on file   Transportation Needs: Not on file   Physical Activity: Not on file   Stress: Not on file   Social Connections: Not on file   Intimate Partner Violence: Not on file   Housing Stability: Not on file     Family History   Problem Relation Age of Onset    Parkinson's Disease Father     Lung Cancer Father      Allergies   Allergen Reactions    Metformin And Related Other (See Comments)     diarrhea    Codeine Nausea And Vomiting    Meperidine Hcl Nausea And Vomiting    Sulfa Antibiotics Nausea And Vomiting    Sumatriptan Nausea And Vomiting         Constitutional:   Alert, opens eyes to name, did not answer questions, family at bedside provides information no acute distress and cooperative to examination with appropriate mood and affect. HEENT:   Head:   Normocephalic and atraumatic. Mouth/Throat:                Mucous membranes are normal.   Eyes:               EOM are normal. No scleral icterus. Nose:               The external appearance of the nose is normal  Ears: The ears appear normal to external inspection   Neck:               Supple, symmetrical, trachea midline, no adenopathy, thyroid symmetric, not enlarged and no tenderness. Cardiovascular:     Normal rate, regular rhythm  Pulmonary/Chest:    O2 via trach collar. Respirations even and unlabored. Abdominal:               Soft. Midly distended. no suprapubic tenderness to palpation. Genitalia: werner draining yellow concentrated urine  Musculoskeletal:               BLE swelling. Reduced ROM R leg  Extremities:               BLE edema  Neurological:               Alert  No cranial nerve deficit. There are no focalizing motor or sensory deficits.     Labs:  WBC:    Lab Results   Component Value Date/Time    WBC 7.6 02/03/2023 04:18 AM Hemoglobin/Hematocrit:    Lab Results   Component Value Date/Time    HGB 9.0 02/03/2023 04:18 AM    HCT 29.1 02/03/2023 04:18 AM     BMP:    Lab Results   Component Value Date/Time     02/03/2023 04:18 AM    K 4.5 02/03/2023 04:18 AM    K 4.1 01/28/2023 05:20 AM     02/03/2023 04:18 AM    CO2 27 02/03/2023 04:18 AM    BUN 7 02/03/2023 04:18 AM    LABALBU 3.1 02/01/2023 10:50 PM    CREATININE 0.7 02/03/2023 04:18 AM    CALCIUM 8.2 02/03/2023 04:18 AM    LABGLOM >60 02/03/2023 04:18 AM       Fuad Cha, APRN - CNP, APRN  02/03/23 10:11 AM  Urology

## 2023-02-03 NOTE — CARE COORDINATION
2/3/23, 8:35 AM EST    DISCHARGE ON GOING [address] day: 1  Location: [facility]24/024-A Reason for admit: Acute diastolic heart failure ([address] [I50.31]  Leg swelling [M79.89]  Supratherapeutic INR [R79.1]  Fall, initial encounter [W19. XXXA]  Closed displaced intertrochanteric fracture of right femur, initial encounter ([facility] [address] [S72.141A]  Acute heart failure, unspecified heart failure type ([address] [I50.9]   Procedure:   2/2 CTA Chest W WO Contrast 1. Stable bilateral nonocclusive pulmonary emboli. Negative for CT   evidence of right heart strain or thrombus propagation. No new pulmonary   emboli since the prior study 1/21/23. 2. New trace bilateral pleural effusions. 3. Nonemergent/incidental findings as above. 2/2 CXR 1. Marked cardiomegaly. Right arm PICC line, catheter tip at cavoatrial projection. . Tracheostomy tube in place. 2. No acute findings. No infiltrates or effusions are seen. 2/2 XR Hip VW W Pelvis right Acute right femoral intertrochanteric fracture, with subtrochanteric   extension. Barriers to Discharge: Urology, Cardiology following, PT/OT, INR 2.47, plasma transfusion ordered. Heart Failure Nurse consulted. O2 at 4.5 liters via trach mask with saturations at 95%. Asa, Keflex, Lasix, diabetes management, electrolyte replacement protocols. Orthopedic Surgery following for rt femur fracture,planning OR when stable medically. PCP: Khoa Wyman MD  Readmission Risk Score: 28.2%  Patient Goals/Plan/Treatment Preferences: From home with spouse has [facility] (nsg, therapy); prefers [facility] for therapy/rehab needs; has [employer] home oxygen 0.25L, trach/suction/nebulizer supplies; PICC PTA; has walker, WC; plans new CHF Clinic. SW following.

## 2023-02-03 NOTE — PLAN OF CARE
Problem: Discharge Planning  Goal: Discharge to home or other facility with appropriate resources  2/3/2023 0054 by Elizabeth Díaz RN  Outcome: Progressing  Flowsheets (Taken 2/2/2023 2043)  Discharge to home or other facility with appropriate resources:   Identify barriers to discharge with patient and caregiver   Arrange for needed discharge resources and transportation as appropriate   Identify discharge learning needs (meds, wound care, etc)   Arrange for interpreters to assist at discharge as needed   Refer to discharge planning if patient needs post-hospital services based on physician order or complex needs related to functional status, cognitive ability or social support system  2/3/2023 0054 by Elizabeth Díaz RN  Outcome: Progressing  [address] (Taken 2/2/2023 2043)  Discharge to home or other facility with appropriate resources:   Identify barriers to discharge with patient and caregiver   Arrange for needed discharge resources and transportation as appropriate   Identify discharge learning needs (meds, wound care, etc)   Arrange for interpreters to assist at discharge as needed   Refer to discharge planning if patient needs post-hospital services based on physician order or complex needs related to functional status, cognitive ability or social support system  2/2/2023 1115 by KIARRA Lopez  Outcome: Progressing  2/2/2023 1059 by Cesilia Torres RN  Outcome: Progressing     Problem: Safety - Adult  Goal: Free from fall injury  2/3/2023 0054 by Elizabeth Díaz RN  Outcome: Progressing  Flowsheets (Taken 2/3/2023 0054)  Free From Fall Injury: Instruct family/caregiver on patient safety  2/3/2023 0054 by Elizabeth Díaz RN  Outcome: Progressing  Flowsheets (Taken 2/3/2023 0054)  Free From Fall Injury: Instruct family/caregiver on patient safety  2/2/2023 1059 by Cesilia Torres RN  Outcome: Progressing     Problem: Skin/Tissue Integrity  Goal: Absence of new skin breakdown  Description: 1. Monitor for areas of redness and/or skin breakdown  2. Assess vascular access sites hourly  3. Every 4-6 hours minimum:  Change oxygen saturation probe site  4. Every 4-6 hours:  If on nasal continuous positive airway pressure, respiratory therapy assess nares and determine need for appliance change or resting period. 2/3/2023 0054 by Soniya Yap RN  Outcome: Progressing  Note: No new skin breakdown this shift. Patient is assisted with turning every two hours and as needed. 2/3/2023 0054 by Soniya Yap RN  Outcome: Progressing  2/2/2023 1059 by Luis Spangler RN  Outcome: Progressing     Problem: Neurosensory - Adult  Goal: Achieves stable or improved neurological status  2/3/2023 0054 by Soniya Yap RN  Outcome: Progressing  Flowsheets (Taken 2/2/2023 2043)  Achieves stable or improved neurological status:   Assess for and report changes in neurological status   Initiate measures to prevent increased intracranial pressure   Maintain blood pressure and fluid volume within ordered parameters to optimize cerebral perfusion and minimize risk of hemorrhage   Monitor temperature, glucose, and sodium. Initiate appropriate interventions as ordered  2/3/2023 0054 by Soniya Yap RN  Outcome: Progressing  Flowsheets (Taken 2/2/2023 2043)  Achieves stable or improved neurological status:   Assess for and report changes in neurological status   Initiate measures to prevent increased intracranial pressure   Maintain blood pressure and fluid volume within ordered parameters to optimize cerebral perfusion and minimize risk of hemorrhage   Monitor temperature, glucose, and sodium.  Initiate appropriate interventions as ordered  2/2/2023 1059 by Luis Spangler RN  Outcome: Progressing  Goal: Achieves maximal functionality and self care  2/3/2023 0054 by Soniya Yap RN  Outcome: Progressing  Flowsheets (Taken 2/2/2023 2043)  Achieves maximal functionality and self care:   Monitor swallowing and airway patency with patient fatigue and changes in neurological status   Encourage visually impaired, hearing impaired and aphasic patients to use assistive/communication devices   Encourage and assist patient to increase activity and self care with guidance from physical therapy/occupational therapy  2/3/2023 0054 by Andreia Zuñiga RN  Outcome: Progressing  Flowsheets (Taken 2/2/2023 2043)  Achieves maximal functionality and self care:   Monitor swallowing and airway patency with patient fatigue and changes in neurological status   Encourage visually impaired, hearing impaired and aphasic patients to use assistive/communication devices   Encourage and assist patient to increase activity and self care with guidance from physical therapy/occupational therapy  2/2/2023 1059 by Nitza Anderson RN  Outcome: Progressing     Problem: Respiratory - Adult  Goal: Achieves optimal ventilation and oxygenation  2/3/2023 0054 by Andreia Zuñiga RN  Outcome: Progressing  Flowsheets (Taken 2/2/2023 2043)  Achieves optimal ventilation and oxygenation:   Assess for changes in respiratory status   Position to facilitate oxygenation and minimize respiratory effort   Assess for changes in mentation and behavior   Oxygen supplementation based on oxygen saturation or arterial blood gases   Encourage broncho-pulmonary hygiene including cough, deep breathe, incentive spirometry   Assess the need for suctioning and aspirate as needed   Assess and instruct to report shortness of breath or any respiratory difficulty   Respiratory therapy support as indicated  2/3/2023 0054 by Andreia Zuñiga RN  Outcome: Progressing  Flowsheets (Taken 2/2/2023 2043)  Achieves optimal ventilation and oxygenation:   Assess for changes in respiratory status   Position to facilitate oxygenation and minimize respiratory effort   Assess for changes in mentation and behavior   Oxygen supplementation based on oxygen saturation or arterial blood gases   Encourage broncho-pulmonary hygiene including cough, deep breathe, incentive spirometry   Assess the need for suctioning and aspirate as needed   Assess and instruct to report shortness of breath or any respiratory difficulty   Respiratory therapy support as indicated  2/2/2023 1059 by Ki Smith RN  Outcome: Progressing     Problem: Cardiovascular - Adult  Goal: Maintains optimal cardiac output and hemodynamic stability  2/3/2023 0054 by Brionna Lambert RN  Outcome: Progressing  Flowsheets (Taken 2/2/2023 2043)  Maintains optimal cardiac output and hemodynamic stability:   Monitor blood pressure and heart rate   Monitor urine output and notify Licensed Independent Practitioner for values outside of normal range   Assess for signs of decreased cardiac output   Administer fluid and/or volume expanders as ordered   Administer vasoactive medications as ordered  2/3/2023 0054 by Brionna Lambert RN  Outcome: Progressing  Flowsheets (Taken 2/2/2023 2043)  Maintains optimal cardiac output and hemodynamic stability:   Monitor blood pressure and heart rate   Monitor urine output and notify Licensed Independent Practitioner for values outside of normal range   Assess for signs of decreased cardiac output   Administer fluid and/or volume expanders as ordered   Administer vasoactive medications as ordered  2/2/2023 1059 by Ki Smith RN  Outcome: Progressing     Problem: Pain  Goal: Verbalizes/displays adequate comfort level or baseline comfort level  2/3/2023 0054 by Brionna Lambert RN  Outcome: Progressing  Flowsheets (Taken 2/3/2023 0054)  Verbalizes/displays adequate comfort level or baseline comfort level:   Encourage patient to monitor pain and request assistance   Assess pain using appropriate pain scale   Administer analgesics based on type and severity of pain and evaluate response   Implement non-pharmacological measures as appropriate and evaluate response   Consider cultural and social influences on pain and pain management   Notify Licensed Independent Practitioner if interventions unsuccessful or patient reports new pain  2/3/2023 0054 by Froy Glaser RN  Outcome: Progressing  Flowsheets (Taken 2/3/2023 0054)  Verbalizes/displays adequate comfort level or baseline comfort level:   Encourage patient to monitor pain and request assistance   Assess pain using appropriate pain scale   Administer analgesics based on type and severity of pain and evaluate response   Implement non-pharmacological measures as appropriate and evaluate response   Consider cultural and social influences on pain and pain management   Notify Licensed Independent Practitioner if interventions unsuccessful or patient reports new pain  2/2/2023 1059 by Rc Castle RN  Outcome: Progressing     Problem: Chronic Conditions and Co-morbidities  Goal: Patient's chronic conditions and co-morbidity symptoms are monitored and maintained or improved  2/3/2023 0054 by Froy Glaser RN  Outcome: Progressing  Flowsheets (Taken 2/2/2023 2043)  Care Plan - Patient's Chronic Conditions and Co-Morbidity Symptoms are Monitored and Maintained or Improved:   Monitor and assess patient's chronic conditions and comorbid symptoms for stability, deterioration, or improvement   Collaborate with multidisciplinary team to address chronic and comorbid conditions and prevent exacerbation or deterioration   Update acute care plan with appropriate goals if chronic or comorbid symptoms are exacerbated and prevent overall improvement and discharge  2/3/2023 0054 by Froy Glaser RN  Outcome: Progressing  Flowsheets (Taken 2/2/2023 2043)  Care Plan - Patient's Chronic Conditions and Co-Morbidity Symptoms are Monitored and Maintained or Improved:   Monitor and assess patient's chronic conditions and comorbid symptoms for stability, deterioration, or improvement   Collaborate with multidisciplinary team to address chronic and comorbid conditions and prevent exacerbation or deterioration   Update acute care plan with appropriate goals if chronic or comorbid symptoms are exacerbated and prevent overall improvement and discharge  2/2/2023 1059 by Jeremiah Brannon RN  Outcome: Progressing     Problem: Genitourinary - Adult  Goal: Absence of urinary retention  Outcome: Progressing  Flowsheets (Taken 2/3/2023 0054)  Absence of urinary retention:   Assess patients ability to void and empty bladder   Monitor intake/output and perform bladder scan as needed   Place urinary catheter per Licensed Independent Practitioner order if needed   Discuss with Licensed Independent Practitioner  medications to alleviate retention as needed  Goal: Urinary catheter remains patent  Outcome: Progressing  Flowsheets (Taken 2/3/2023 0054)  Urinary catheter remains patent: Assess patency of urinary catheter     Problem: Infection - Adult  Goal: Absence of infection at discharge  Outcome: Progressing  Flowsheets (Taken 2/3/2023 0054)  Absence of infection at discharge:   Assess and monitor for signs and symptoms of infection   Monitor lab/diagnostic results   Monitor all insertion sites i.e., indwelling lines, tubes and drains   Ione appropriate cooling/warming therapies per order   Administer medications as ordered   Instruct and encourage patient and family to use good hand hygiene technique   Identify and instruct in appropriate isolation precautions for identified infection/condition  Goal: Absence of infection during hospitalization  Outcome: Progressing  Flowsheets (Taken 2/3/2023 0054)  Absence of infection during hospitalization:   Assess and monitor for signs and symptoms of infection   Monitor lab/diagnostic results   Monitor all insertion sites i.e., indwelling lines, tubes and drains   Ione appropriate cooling/warming therapies per order   Administer medications as ordered   Instruct and encourage patient and family to use good hand hygiene technique   Identify and instruct in appropriate isolation precautions for identified infection/condition   Care plan reviewed with patient. Patient verbalize understanding of the plan of care and contribute to goal setting.

## 2023-02-03 NOTE — PROGRESS NOTES
Attempted SBIRT per trauma services, pt not awake.  at bedside states she has not been awake since returning from surgery.  FAMILIA to follow for completion

## 2023-02-03 NOTE — PLAN OF CARE
Problem: Respiratory - Adult  Goal: Achieves optimal ventilation and oxygenation  2/3/2023 1235 by Maurice Landers RCP  Outcome: Progressing    28% fio2 at 5 lpm large volume aerosol via trach mask  6.0 cuffless trach

## 2023-02-03 NOTE — OP NOTE
Operative Note      Patient: Arnold Price  YOB: 1950  MRN: [id number]    Date of Procedure: 2/3/2023    Pre-Op Diagnosis: Closed 2-part intertrochanteric fracture of right femur, initial encounter [facility]) [facility] Knott    Post-Op Diagnosis: Same       Procedure: Open treatment right intertrochanteric femur fracture the cephalomedullary nail 63889    Surgeon(s):  Beryle Many, MD    Assistant:   First Assistant: Christiano Morse  Physician Assistant: Tea Yañez PA-C; Jose Grider PA-C    Anesthesia: General    Estimated Blood Loss (mL): 997     Complications: None    Specimens:   * No specimens in log *    Implants:  Implant Name Type Inv. Item Serial No.  Lot No. LRB No. Used Action   KIT SCR LAG L85MM [id number] L80MM DIA7MM COMPR INTEGR INTLOK - [id number]  KIT SCR LAG L85MM [id number] L80MM DIA7MM COMPR [employer]- [id number] Right 1 Implanted   NAIL IM L36CM [id number] 125DEG RT FEM TRAPEZOIDAL ORTH [facility] - [id number]  NAIL IM L36CM [id number] 125DEG RT FEM TRAPEZOIDAL ORTH [facility]- [id number] Right 1 Implanted   SCREW BNE L37.5MM DIA5MM MORGAN TIB G TI ST SELF DRL HEX DRV - [id number]  SCREW BNE L37.5MM DIA5MM MORGAN TIB G TI ST SELF DRL HEX DRV  MAK AND NEPHEW Elana Lashon [id number] Right 1 Implanted         Drains:   Urinary Catheter 02/02/23 (Active)   Catheter Indications Urinary retention (acute or chronic), continuous bladder irrigation or bladder outlet obstruction; Need for fluid volume management of the critically ill patient in a critical care setting 02/03/23 0809   Site Assessment [place] 02/03/23 0809   Urine Color Yellow 02/03/23 0809   Urine Appearance Clear 02/03/23 0809   Urine Odor Malodorous 02/03/23 0809   Collection Container Standard 02/03/23 0809   Securement Method Securing device (Describe) 02/03/23 0809   Catheter Care  Soap and water 02/03/23 0315   Catheter Best Practices  Bag below bladder;Catheter secured to thigh;Drainage tube clipped to bed;Drainage bag less than half full;Bag not on floor 02/03/23 0809   Status Draining 02/03/23 0809   Output (mL) 400 mL 02/03/23 1304       [REMOVED] External Urinary Catheter (Removed)   Placement Initiated 02/02/23 0802   Catheter Care Suction Canister/Tubing changed;Catheter/Wick replaced 02/02/23 0802   Perineal Care Yes 02/02/23 0802   Suction 40 mmgHg continuous 02/02/23 0802   Urine Color Yellow 02/02/23 0802   Urine Appearance Clear 02/02/23 0802   Output (mL) 200 mL 02/02/23 1409       Findings: Confirmed    Detailed Description of Procedure:       INDICATION FOR PROCEDURE     The patient is an 81y.o.-year-old with a history of a fall. Patient complained of Right hip pain. The patient has multiple medical comorbidities and was admitted with an intertrochanteric hip fracture. Patient was evaluated medically and felt patient to be of acceptable risk. It was felt a cephomedullary nail would be the best treatment option. Discussed with patient and elected to proceed. NARRATIVE:     PROCEDURE DETAILS     The patient was taken to the operating room, underwent a general anesthetic. The fractured side was placed in longitudinal traction to aid in the reduction. The other leg was draped out of the field. Timeout was taken, consent was confirmed. The patient received 2 gram of Ancef preoperatively. Started with 3.2 mm guide pin on the medial face of the greater trochanter and was then advanced under fluoroscopic guidance. The incision size was increased. A 16 mm channel reamer was then passed down to the level of the lesser trochanter. We placed the 10 mm x 360 mm Intertan nail down to the appropriate depth. We used the jig and a 3.2 mm guide pin placed in the center of the femoral head. We then used a dimpler followed by a cannulated reamer,the rigid reamer and the anti-rotation bar. We placed a  85mm lag screw, 80 mm compression screw.  Good compression noted across the fracture site. Traction was removed and then placed a 37.5 mm distal locking screw with a free-handed technique under fluoroscopic guidance. We injected local anesthetic including morphine and Toradol. Wounds were irrigated and closed with 2-0 Vicryl and Prineo. The patient was then awakened and returned to recovery room in fair condition. POSTOPERATIVE PLAN: Weight bearing as tolerated, dry dressings p.r.n. First postop visit will be in 8 weeks x-rays, 2 views of the Right femur . The physician assistant assisted throughout the procedure with positioning, draping, retraction, wound closure, and dressings application.       Varghese Pierre MD              Electronically signed by Varghese Pierre MD on 2/3/2023 at 2:23 PM

## 2023-02-03 NOTE — ANESTHESIA POSTPROCEDURE EVALUATION
Department of Anesthesiology  Postprocedure Note    Patient: Kaleigh Sierra  MRN: [id number]  YOB: 1950  Date of evaluation: 2/3/2023      Procedure Summary     Date: 02/03/23 Room / Location: [facility] OR  / [facility] OR    Anesthesia Start: 1350 Anesthesia Stop: 1439    Procedure: RIGHT FEMUR IM NAIL WITH INTERTAN (Right: Hip) Diagnosis:       Closed 2-part intertrochanteric fracture of right femur, initial encounter ([facility])      (Closed 2-part intertrochanteric fracture of right femur, initial encounter ([facility]) [S72.141A])    Surgeons: Esvin Aragon MD Responsible Provider: Jn Cordon DO    Anesthesia Type: general ASA Status: 4          Anesthesia Type: No value filed.    Patricia Phase I: Patricia Score: 8    Patricia Phase II:        Anesthesia Post Evaluation    Patient location during evaluation: PACU  Patient participation: complete - patient participated  Level of consciousness: awake  Airway patency: patent  Nausea & Vomiting: no nausea  Complications: no  Cardiovascular status: hemodynamically stable  Respiratory status: acceptable  Hydration status: stable

## 2023-02-03 NOTE — PROGRESS NOTES
Patient has arrived to the pre-op holding area. Nasal swabbing is performed and the patient's tempeture is 98.1°F. Questions and concerns are addressed with patient and family.

## 2023-02-03 NOTE — PROGRESS NOTES
Cardiology Progress Note      Patient:  Allie Segura  YOB: 1950  MRN: [id number]   Acct: [de-identified]  Admit Date:  2/1/2023  Primary Cardiologist: José Ruano MD  Note per Dr Marcella Rocha:  Jovani Munoz:    Heart failure exacerbation, recently take off lasix      CHIEF COMPLAINT:    SOB     HISTORY OF PRESENT ILLNESS:    Allie Segura is a pleasant 81year old female patient with past medical history that includes:   Past Medical History        Past Medical History:   Diagnosis Date    Arthritis      Coronary artery disease      COVID      Hyperlipidemia      Primary hypertension      Type 2 diabetes mellitus ([facility] [address] 2018      She has h/o COVID1-9 in 12/2021, respiratory failure, s/p tracheostomy. She has known h/o CAD, prior PCI. She seems to have h/o CHF (was previously on diuretics, recently discontinued). TTE on 12/2021 revealed an EF of 30-35%. A stress test on 06/2022 was negative for ischemia. Most recent Echocardiogram on 1/23/2023 revealed an EF of 60-65%, grade I DD, LAE. She was recently admitted to the hospital and was treated for DVT/PE, discharged home on Xarelto. Since her diuretics were stopped, she was noted to have increased leg swelling per her family at bedside. The patient was admitted to the hospital on 2/2/2023 after she presented with leg swelling, nausea, vomiting. She has chronic mild shortness of breath, no recent worsening. Per chart review, patient had a fall, right hip fracture was noted on Xray, Orthopedics consulted. Cardiology was consulted for acute on chronic HFpEF. Patient denies chest pain. All labs, EKG's, diagnostic testing and images as well as cardiac cath, stress testing   were reviewed during this encounter   \"    Subjective (Events in last 24 hours):   Pt awake, alert. Trach. Patient groggy. Family at bedside. D/w them about current treatment plans. Back on lasix PO, is down about 1.5 liters, werner placed for urinary retention.      Objective:   BP 117/63   Pulse 100   Temp 99.3 °F (37.4 °C) (Oral)   Resp 18   Ht 5' 2\" (1.575 m)   Wt 210 lb (95.3 kg)   LMP  (LMP Unknown)   SpO2 95%   BMI 38.41 kg/m²      vss  TELEMETRY: nsr    Physical Exam:  General Appearance: alert and oriented to person, place and time, in no acute distress  Cardiovascular: normal rate, regular rhythm, normal S1 and S2, no murmurs, rubs, clicks, or gallops, distal pulses intact, no carotid bruits, no JVD  Pulmonary/Chest: clear to auscultation bilaterally- no wheezes, rales or rhonchi, normal air movement, no respiratory distress  Abdomen: soft, non-tender, non-distended, normal bowel sounds, no masses   Extremities: no cyanosis, clubbing or edema, pulse   Skin: warm and dry, no rash or erythema  Neurological: alert, oriented, normal speech, no focal findings or movement disorder noted    Medications:    ceFAZolin  2,000 mg IntraVENous On Call to OR    aspirin  81 mg Oral Daily    insulin glargine  22 Units SubCUTAneous Nightly    metoprolol succinate  25 mg Oral Daily    rosuvastatin  10 mg Oral Daily    senna  1 tablet Oral Nightly    sodium chloride flush  5-40 mL IntraVENous 2 times per day    insulin lispro  0-8 Units SubCUTAneous TID WC    insulin lispro  0-4 Units SubCUTAneous Nightly    scopolamine  1 patch TransDERmal Q72H    [Held by provider] rivaroxaban  15 mg Oral BID WC    cephALEXin  500 mg Oral 4 times per day    furosemide  20 mg Oral Daily      sodium chloride      sodium chloride      sodium chloride       sodium chloride, , PRN  sodium chloride, , PRN  albuterol sulfate HFA, 2 puff, Q6H PRN  Menthol, 1 lozenge, Q3H PRN  guaiFENesin, 400 mg, BID PRN  tiZANidine, 4 mg, Q8H PRN  sodium chloride flush, 5-40 mL, PRN  sodium chloride, , PRN  polyethylene glycol, 17 g, Daily PRN  acetaminophen, 650 mg, Q6H PRN   Or  acetaminophen, 650 mg, Q6H PRN  potassium chloride, 40 mEq, PRN   Or  potassium alternative oral replacement, 40 mEq, PRN   Or  potassium chloride, 10 mEq, PRN  magnesium sulfate, 2,000 mg, PRN  morphine, 1 mg, Q6H PRN  oxyCODONE, 2.5 mg, Q4H PRN   Or  oxyCODONE, 5 mg, Q4H PRN  hydrOXYzine, 25 mg, Q6H PRN        Diagnostics:  EKG:     Echo:   Conclusions      Summary   Technically difficult study due to poor acoustic windows. Normal left ventricle size and systolic function. Ejection fraction was   estimated at 60 to 65 %. There were no regional left ventricular wall   motion abnormalities and wall thickness was within normal limits. Doppler parameters were consistent with abnormal left ventricular   relaxation (grade 1 diastolic dysfunction). The left atrium is Mildly dilated. Signature      ----------------------------------------------------------------   Electronically signed by Marcie Orozco MD (Interpreting   physician) on 01/23/2023  Stress:   Imaging Results:Calculated gated LVEF 78 %. The T.I.D. ratio was 1.6 . Myocardial perfusion imaging is not suggestive for myocardial ischemia. This study was negative for ischemia. Conclusions      Summary   Lexiscan EKG stress test is non-diagnostic for ischemia. Calculated gated LVEF 78 %. The T.I.D. ratio was 1.6 . Myocardial perfusion imaging is not suggestive for myocardial ischemia. This study was negative for ischemia. Recommendation   Clinical correlation is recommended. Aggressive risk factor management. Medical management. Signatures      ----------------------------------------------------------------   Electronically signed by Graciela Cintron MD (Interpreting   Cardiologist) on 06/07/2022  Left Heart Cath:     Lab Data:    Cardiac Enzymes:  No results for input(s): CKTOTAL, CKMB, CKMBINDEX, TROPONINI in the last 72 hours.     CBC:   Lab Results   Component Value Date/Time    WBC 7.6 02/03/2023 04:18 AM    RBC 2.85 02/03/2023 04:18 AM    HGB 9.0 02/03/2023 04:18 AM    HCT 29.1 02/03/2023 04:18 AM     02/03/2023 04:18 AM       CMP:    Lab Results   Component Value Date/Time     02/03/2023 04:18 AM    K 4.5 02/03/2023 04:18 AM    K 4.1 01/28/2023 05:20 AM     02/03/2023 04:18 AM    CO2 27 02/03/2023 04:18 AM    BUN 7 02/03/2023 04:18 AM    CREATININE 0.7 02/03/2023 04:18 AM    LABGLOM >60 02/03/2023 04:18 AM    GLUCOSE 102 02/03/2023 04:18 AM    CALCIUM 8.2 02/03/2023 04:18 AM       Hepatic Function Panel:    Lab Results   Component Value Date/Time    ALKPHOS 95 02/01/2023 10:50 PM    ALT 12 02/01/2023 10:50 PM    AST 14 02/01/2023 10:50 PM    PROT 5.2 02/01/2023 10:50 PM    BILITOT 0.4 02/01/2023 10:50 PM    BILIDIR <0.2 02/01/2023 10:50 PM    LABALBU 3.1 02/01/2023 10:50 PM       Magnesium:    Lab Results   Component Value Date/Time    MG 1.8 02/03/2023 04:18 AM       PT/INR:    Lab Results   Component Value Date/Time    INR 2.47 02/03/2023 04:18 AM       HgBA1c:    Lab Results   Component Value Date/Time    LABA1C 5.5 01/24/2023 05:40 AM       FLP:  No results found for: TRIG, HDL, LDLCALC, LDLDIRECT, LABVLDL    TSH:    Lab Results   Component Value Date/Time    TSH 6.880 02/02/2023 08:10 AM         Assessment:  Acute on chronic CHF  Hx of acute systolic CHF, recovered EF  Cad s/p prior PCI  DM  HLD  Hx of COVID, prior permanent trach  Hx of PE/DVT  HTN  Acute right femoral fracture--needs OR, looks like today for fixation  Elevated TSH    Plan:  Daily weights  2 g sodium restriction daily  2 L fluid restriction daily  Keep K>4, Mg>2    CHF Guidelines  BB-yes. Toprol 25 mg daily. ACE/ARB/Entresto- No, lower BP. 110s/60s lately  Diuretics-yes. Lasix 20 mg daily. Aldactone-no. Consider later  Jardiance/Farxiga-no. Depends on insurance. Verquvo- consider later. Crestor 10 mg daily. Should monitor volume status closely after surgery, especially if placed on IVF. Cardiology will monitor. Call with any further concerns. CHF f/u as scheduled. F/u with DR Cuellar in 2 months.        Electronically signed by Rico Munoz PA-C on 2/3/2023 at 10:41 AM

## 2023-02-03 NOTE — CONSENT
Informed Consent for Blood Component Transfusion Note    I have discussed with the patient and spouse the rationale for blood component transfusion; its benefits in treating or preventing fatigue, organ damage, or death; and its risk which includes mild transfusion reactions, rare risk of blood borne infection, or more serious but rare reactions. I have discussed the alternatives to transfusion, including the risk and consequences of not receiving transfusion. The patient and spouse had an opportunity to ask questions and had agreed to proceed with transfusion of blood components.     Electronically signed by Margarette Calles MD on 2/3/23 at 9:53 AM EST

## 2023-02-03 NOTE — DISCHARGE INSTRUCTIONS
Gerber Thurman MD       ACTIVITY / WEIGHTBEARING  INSTRUCTIONS:  Weightbearing as tolerated surgical extremity. PT/OT     DIET:  Increase Fluid/Water intake, eat foods high in fiber, fruits and vegetables to help to prevent constipation. WOUND/DRESSING INSTRUCTIONS:  Always ensure you wash your hands before and after caring for your wound/dressing. Keep your dressing clean and dry. Change dressing as needed. Can wash around incision. If prineo (mesh tape dressing) in place, this may be removed after 3 weeks. You may shower with prineo dressing if no active drainage coming from incision. Do not let shower water directly hit the incision. Dry completely. Do not place antibiotic ointment, lotion, creams on surgical incision. Smoking impairs adequate wound healing. If smoking, consider quitting. May apply ice to surgical incision to help to prevent swelling. MEDICATION INSTRUCTIONS:  Take pain medication as prescribed. See orders regarding resuming your home medications and any new medications. Continue blood thinner if ordered by your physician. FOLLOW-UP CARE:  If a follow-up appointment was not made for you at discharge, call [phone] [facility] IN[facility] office) or [phone] [facility] office) to schedule an appointment for 8 weeks. Call Dr Marcelo Garsia office with any concerns. Signs of infection such as fever, chills, redness, pus, or bad smelling discharge from incision site. Excessive bleeding, swelling, increasing pain, or discharge around incision site. The stitches or staples come apart at the incision site. Cough shortness of breath, or chest pain. Severe nausea or vomiting. Pain that you cannot control with the medications you have been given or  pain becomes worse. Numbness, tingling, or loss of feeling in your leg, knee, or foot. Pain, burning, urgency, or frequency of urination.       If a medical emergerncy, please call 911 or go to your local emergency room. F/u with CHF clinic as scheduled. F/u with DR Cuellar in 2 months.        Continue:  Continue current medications  Daily weights and record  Fluid restriction of 2 Liters per day  Limit sodium in diet to around 4381-9423 mg/day  Monitor BP  Activity as tolerated     Call the [facility] for any of the following symptoms: [phone]  Weight gain of 2-3 pounds in 1 day or 5 pounds in 1 week  Increased shortness of breath  Shortness of breath while laying down  Cough  Chest pain  Swelling in feet, ankles or legs  Tenderness or bloating in the abdomen  Fatigue   Decreased appetite or nausea   Confusion

## 2023-02-03 NOTE — PROGRESS NOTES
[address]  PHYSICAL THERAPY MISSED TREATMENT NOTE  [facility] ICU STEPDOWN TELEMETRY 4K    Date: 2/3/2023  Patient Name: Joce Bonilla        MRN: [id number]   : 1950  (81 y.o.)  Gender: female                REASON FOR MISSED TREATMENT:  Missed Treat.  patient on bedrest per ortho note due to a closed displaced intertrochanteric fracture of right femur s/p fall in the ED. Possible sx today, will hold.

## 2023-02-03 NOTE — PLAN OF CARE
Problem: Discharge Planning  Goal: Discharge to home or other facility with appropriate resources  Outcome: Progressing     Problem: Safety - Adult  Goal: Free from fall injury  Outcome: Progressing     Problem: Skin/Tissue Integrity  Goal: Absence of new skin breakdown  Description: 1. Monitor for areas of redness and/or skin breakdown  2. Assess vascular access sites hourly  3. Every 4-6 hours minimum:  Change oxygen saturation probe site  4. Every 4-6 hours:  If on nasal continuous positive airway pressure, respiratory therapy assess nares and determine need for appliance change or resting period.   Outcome: Progressing     Problem: Neurosensory - Adult  Goal: Achieves stable or improved neurological status  Outcome: Progressing     Problem: Neurosensory - Adult  Goal: Achieves maximal functionality and self care  Outcome: Progressing     Problem: Respiratory - Adult  Goal: Achieves optimal ventilation and oxygenation  2/3/2023 1631 by Erasto Palmer RN  Outcome: Progressing     Problem: Cardiovascular - Adult  Goal: Maintains optimal cardiac output and hemodynamic stability  Outcome: Progressing     Problem: Pain  Goal: Verbalizes/displays adequate comfort level or baseline comfort level  Outcome: Progressing     Problem: Chronic Conditions and Co-morbidities  Goal: Patient's chronic conditions and co-morbidity symptoms are monitored and maintained or improved  Outcome: Progressing     Problem: Genitourinary - Adult  Goal: Absence of urinary retention  Outcome: Progressing     Problem: Genitourinary - Adult  Goal: Urinary catheter remains patent  Outcome: Progressing     Problem: Infection - Adult  Goal: Absence of infection at discharge  Outcome: Progressing     Problem: Infection - Adult  Goal: Absence of infection at discharge  Outcome: Progressing     Problem: Infection - Adult  Goal: Absence of infection during hospitalization  Outcome: Progressing

## 2023-02-03 NOTE — PROGRESS NOTES
Orthopaedic Progress Note      SUBJECTIVE   Ms. Jhon James is hospital day 2    Seen at bedside this morning  no adverse events overnight  Pain with modest control  NPO  Denies any numbness/paresthesia to RLE  Wen inserted by urology yesterday, appreciate recs  Cards evaluated yesterday, no planned intervention for further optimization      OBJECTIVE      Physical    VITALS:  /74   Pulse 92   Temp 99 °F (37.2 °C) (Oral)   Resp 20   Ht 5' 2\" (1.575 m)   Wt 210 lb (95.3 kg)   LMP  (LMP Unknown)   SpO2 100%   BMI 38.41 kg/m²   I/O last 3 completed shifts: In: 150 [P.O.:150]  Out: 1500 [Urine:1500]    GENERAL APPEARANCE: Awake and oriented x4. No acute distress, except appropriate to injury, trach dependent  MOOD AND AFFECT: Calm appropriate to situation  HEAD: normocephalic, atraumatic   EYES: equal and reactive to light, Extraocular movements are normal. Pupils are equal in size. Hematologic/Lymphatic: no lymphadenopathy to upper or lower extremity. MSK   RLE:- atraumatic hip, knee, ankle, no lacerations or lesions. Nontender to palpation asis iliac crest, TTP at greater troch and femoral shaft, nontender medial lateral joint line, calf supple but tenderness to baseline from known dvt, nontender medial lateral mal, midfoot, calc. Able to perform isometric quad fire, gastroc ta ehl intact, pain with IR ER. sensation to light touch intact, distal pulses dopplerable.          Data  CBC:   Lab Results   Component Value Date/Time    WBC 7.6 02/03/2023 04:18 AM    HGB 9.0 02/03/2023 04:18 AM     02/03/2023 04:18 AM     BMP:    Lab Results   Component Value Date/Time     02/03/2023 04:18 AM    K 4.5 02/03/2023 04:18 AM    K 4.1 01/28/2023 05:20 AM     02/03/2023 04:18 AM    CO2 27 02/03/2023 04:18 AM    BUN 7 02/03/2023 04:18 AM    CREATININE 0.7 02/03/2023 04:18 AM    CALCIUM 8.2 02/03/2023 04:18 AM    GLUCOSE 102 02/03/2023 04:18 AM     Uric Acid:  No components found for: URIC  PT/INR: Lab Results   Component Value Date/Time    INR 2.47 02/03/2023 04:18 AM     Troponin:  No results found for: TROPONINI  Urine Culture:  No components found for: CURINE      Current Inpatient Medications    Current Facility-Administered Medications: 0.9 % sodium chloride infusion, , IntraVENous, PRN  albuterol sulfate HFA (PROVENTIL;VENTOLIN;PROAIR) 108 (90 Base) MCG/ACT inhaler 2 puff, 2 puff, Inhalation, Q6H PRN  aspirin chewable tablet 81 mg, 81 mg, Oral, Daily  Menthol lozenge 4.8 mg, 1 lozenge, Oral, Q3H PRN  guaiFENesin (ROBITUSSIN) 100 MG/5ML liquid 400 mg, 400 mg, Oral, BID PRN  insulin glargine (LANTUS) injection vial 22 Units, 22 Units, SubCUTAneous, Nightly  metoprolol succinate (TOPROL XL) extended release tablet 25 mg, 25 mg, Oral, Daily  rosuvastatin (CRESTOR) tablet 10 mg, 10 mg, Oral, Daily  senna (SENOKOT) tablet 8.6 mg, 1 tablet, Oral, Nightly  tiZANidine (ZANAFLEX) tablet 4 mg, 4 mg, Oral, Q8H PRN  sodium chloride flush 0.9 % injection 5-40 mL, 5-40 mL, IntraVENous, 2 times per day  sodium chloride flush 0.9 % injection 5-40 mL, 5-40 mL, IntraVENous, PRN  0.9 % sodium chloride infusion, , IntraVENous, PRN  polyethylene glycol (GLYCOLAX) packet 17 g, 17 g, Oral, Daily PRN  acetaminophen (TYLENOL) tablet 650 mg, 650 mg, Oral, Q6H PRN **OR** acetaminophen (TYLENOL) suppository 650 mg, 650 mg, Rectal, Q6H PRN  potassium chloride (KLOR-CON M) extended release tablet 40 mEq, 40 mEq, Oral, PRN **OR** potassium bicarb-citric acid (EFFER-K) effervescent tablet 40 mEq, 40 mEq, Oral, PRN **OR** potassium chloride 10 mEq/100 mL IVPB (Peripheral Line), 10 mEq, IntraVENous, PRN  magnesium sulfate 2000 mg in 50 mL IVPB premix, 2,000 mg, IntraVENous, PRN  insulin lispro (HUMALOG) injection vial 0-8 Units, 0-8 Units, SubCUTAneous, TID WC  insulin lispro (HUMALOG) injection vial 0-4 Units, 0-4 Units, SubCUTAneous, Nightly  scopolamine (TRANSDERM-SCOP) transdermal patch 1 patch, 1 patch, TransDERmal, Q72H  [Held by provider] rivaroxaban (XARELTO) tablet 15 mg, 15 mg, Oral, BID WC  morphine (PF) injection 1 mg, 1 mg, IntraVENous, Q6H PRN  cephALEXin (KEFLEX) capsule 500 mg, 500 mg, Oral, 4 times per day  oxyCODONE (ROXICODONE) immediate release tablet 2.5 mg, 2.5 mg, Oral, Q4H PRN **OR** oxyCODONE (ROXICODONE) immediate release tablet 5 mg, 5 mg, Oral, Q4H PRN  hydrOXYzine (VISTARIL) injection 25 mg, 25 mg, IntraMUSCular, Q6H PRN  furosemide (LASIX) tablet 20 mg, 20 mg, Oral, Daily        PLAN    Ms. Rhonda Glass is hospital day 2    NPO  Ready for OR today for right femur IMN with Dr Fernanda Lefort  FFP this morning to aid in INR, still high but risk benefit of continued delay of surgery addressed  Bedrest  Multimodal pain control  Surgical consent completed, blood product consent completed  No apparent intervention from cardiology or hospitalist for further optimization for surgery, risk has been addressed with patient   Ortho to follow

## 2023-02-03 NOTE — PROGRESS NOTES
[address] THERAPY MISSED TREATMENT NOTE  [facility] ICU STEPDOWN TELEMETRY 4K  [id number]      Date: 2/3/2023  Patient Name: Emilee Turner        CSN: [id number]   : 1950  (81 y.o.)  Gender: female                REASON FOR MISSED TREATMENT:  per chart review; patient on bedrest per ortho note due to a closed displaced intertrochanteric fracture of right femur s/p fall in the ED with possible surgery today. OT to hold this date and check back next date for further instruction for activity.

## 2023-02-03 NOTE — ANESTHESIA PRE PROCEDURE
Department of Anesthesiology  Preprocedure Note       Name:  Kaleigh Sierra   Age:  72 y.o.  :  1950                                          MRN:  [id number]         Date:  2/3/2023      Surgeon: Surgeon(s):  Esvin Aragon MD    Procedure: Procedure(s):  RIGHT FEMUR IM NAIL WITH INTERTAN    Medications prior to admission:   Prior to Admission medications    Medication Sig Start Date End Date Taking? Authorizing Provider   D3-50 1.25 MG (01973 UT) CAPS TAKE 1 CAPSULE BY MOUTH ONCE A WEEK 22   Historical Provider, MD   traMADol (ULTRAM) 50 MG tablet  23   Historical Provider, MD   pantoprazole (PROTONIX) 40 MG tablet TAKE 1 BY MOUTH ONCE DAILY BEFORE A MEAL 22   Historical Provider, MD   nystatin (MYCOSTATIN) 099585 UNIT/GM powder APPLY POWDER TOPICALLY TWICE DAILY UNDER BOTH BREASTS 22   Historical Provider, MD   ondansetron (ZOFRAN-ODT) 4 MG disintegrating tablet Take 2 tablets by mouth every 8 hours as needed for Nausea or Vomiting 23  WYATT Rivera CNP   senna (SENOKOT) 8.6 MG tablet Take 1 tablet by mouth nightly 23  WYATT Rivera CNP   rivaroxaban 15 & 20 MG Starter Pack Take as directed on package. 23   WYATT Rivera CNP   insulin glargine (LANTUS SOLOSTAR) 100 UNIT/ML injection pen Inject 22 Units into the skin nightly 23   WYATT Rivera CNP   amoxicillin-clavulanate (AUGMENTIN) 875-125 MG per tablet Take 1 tablet by mouth 2 times daily for 10 days  Patient not taking: No sig reported 23  WYATT Rivera CNP   sodium chloride flush 0.9 % injection Infuse 5-40 mLs intravenously 2 times daily Flush 10 mL twice daily. 23   WYATT Rivera CNP   tiZANidine (ZANAFLEX) 4 MG tablet  22   Historical Provider, MD   Benzocaine-Menthol (CEPACOL) 15-2.3 MG LOZG Take 1 lozenge by mouth every 3 hours as needed (sore throat, pain) 22   MICHAEL Mejia   Fexofenadine HCl  (MUCINEX ALLERGY PO) Take 1 tablet by mouth 2 times daily Cuts in half to swallow easier    Historical Provider, MD   insulin aspart (NOVOLOG FLEXPEN) 100 UNIT/ML injection pen Inject 20-25 Units into the skin in the morning and 20-25 Units at noon and 20-25 Units in the evening. Inject before meals.  Patient taking differently: Inject 10 Units into the skin 2 times daily (before meals) 10 units with sliding scale at lunch and dinner 8/14/22 11/22/22  Robb Story DO   glucagon 1 MG injection Inject 1 mg into the muscle See Admin Instructions Follow package directions for low blood sugar. 8/13/22   Robb Story DO   Insulin Pen Needle 31G X 6 MM MISC 1 each by Does not apply route daily 8/13/22   Robb Story DO   insulin lispro, 1 Unit Dial, (HUMALOG KWIKPEN) 100 UNIT/ML SOPN Inject 20-25 Units into the skin in the morning and 20-25 Units at noon and 20-25 Units in the evening. Inject before meals. 8/13/22 11/22/22  Robb Story DO   albuterol (ACCUNEB) 0.63 MG/3ML nebulizer solution Take 1 ampule by nebulization every 6 hours as needed for Wheezing    Historical Provider, MD   lidocaine (XYLOCAINE) 4 % external solution Apply 1 Dose topically as needed for Pain Apply topically as needed.    Historical Provider, MD   Cholecalciferol (VITAMIN D3 PO) Take 50,000 Units by mouth every 30 days 4000 units    Historical Provider, MD   NONFORMULARY daily resveratrol    Historical Provider, MD   guaiFENesin 400 MG tablet Take 400 mg by mouth 2 times daily as needed for Cough    Historical Provider, MD   insulin NPH (HUMULIN N;NOVOLIN N) 100 UNIT/ML injection vial Inject into the skin 2 times daily (before meals) Takes BID on the SS  11/22/22  Historical Provider, MD   metoprolol succinate (TOPROL XL) 25 MG extended release tablet Take 1 tablet by mouth daily  Patient not taking: Reported on 2/3/2023 4/8/22   Rhys Storm MD   albuterol sulfate  (90 Base) MCG/ACT inhaler Inhale 2 puffs into the  lungs every 6 hours as needed for Wheezing 1/27/22   Severa Grime, MD   OXYGEN Inhale 2 L/min into the lungs continuous prn (during activities such as walking) 1/27/22   Severa Grime, MD   rosuvastatin (CRESTOR) 10 MG tablet Take 10 mg by mouth daily    Historical Provider, MD   nitroGLYCERIN (NITROSTAT) 0.4 MG SL tablet Place 0.4 mg under the tongue every 5 minutes as needed for Chest pain up to max of 3 total doses. If no relief after 1 dose, call 911. Historical Provider, MD   aspirin 81 MG chewable tablet Take 81 mg by mouth daily    Historical Provider, MD       Current medications:    Current Facility-Administered Medications   Medication Dose Route Frequency Provider Last Rate Last Admin    0.9 % sodium chloride infusion   IntraVENous PRN Lacey Poole MD        ceFAZolin (ANCEF) 2000 mg in 0.9% sodium chloride 50 mL IVPB  2,000 mg IntraVENous On Call to [address] [place] [address]. SAADIA Fields        0.9 % sodium chloride infusion   IntraVENous PRN Elizabeth Shoemaker.  SAADIA Fields        albuterol sulfate HFA (PROVENTIL;VENTOLIN;PROAIR) 108 (90 Base) MCG/ACT inhaler 2 puff  2 puff Inhalation Q6H PRN Haynes Kawasaki, APRN - CNP        aspirin chewable tablet 81 mg  81 mg Oral Daily Haynes Kawasaki, APRN - CNP   81 mg at 02/03/23 3551    Menthol lozenge 4.8 mg  1 lozenge Oral Q3H PRN Haynes Kawasaki, APRN - CNP        guaiFENesin (ROBITUSSIN) 100 MG/5ML liquid 400 mg  400 mg Oral BID PRN Haynes Kawasaki, APRN - CNP        insulin glargine (LANTUS) injection vial 22 Units  22 Units SubCUTAneous Nightly Haynes Kawasaki, APRN - CNP   22 Units at 02/02/23 2056    metoprolol succinate (TOPROL XL) extended release tablet 25 mg  25 mg Oral Daily Haynes Kawasaki, APRN - CNP        rosuvastatin (CRESTOR) tablet 10 mg  10 mg Oral Daily Haynes Kawasaki, APRN - CNP   10 mg at 02/03/23 9532    senna (SENOKOT) tablet 8.6 mg  1 tablet Oral Nightly Haynes Kawasaki, APRN - CNP        tiZANidine (ZANAFLEX) tablet 4 mg  4 mg Oral Q8H PRN WYATT Gibson - CNP        sodium chloride flush 0.9 % injection 5-40 mL  5-40 mL IntraVENous 2 times per day WYATT Gibson - CNP   10 mL at 02/03/23 6160    sodium chloride flush 0.9 % injection 5-40 mL  5-40 mL IntraVENous PRN WYATT Gibson - CNP        0.9 % sodium chloride infusion   IntraVENous PRN WYATT Gibson - CNP        polyethylene glycol (GLYCOLAX) packet 17 g  17 g Oral Daily PRN WYATT Gibson - CNP        acetaminophen (TYLENOL) tablet 650 mg  650 mg Oral Q6H PRN WYATT Gibson - CNP        Or    acetaminophen (TYLENOL) suppository 650 mg  650 mg Rectal Q6H PRN WYATT Gibson - CNP        potassium chloride (KLOR-CON M) extended release tablet 40 mEq  40 mEq Oral PRN WYATT Gibson - CNP        Or    potassium bicarb-citric acid (EFFER-K) effervescent tablet 40 mEq  40 mEq Oral PRN WYATT Gibson - CNP        Or    potassium chloride 10 mEq/100 mL IVPB (Peripheral Line)  10 mEq IntraVENous PRN WYATT Gibson - CNP        magnesium sulfate 2000 mg in 50 mL IVPB premix  2,000 mg IntraVENous PRN WYATT Gibson CNP        insulin lispro (HUMALOG) injection vial 0-8 Units  0-8 Units SubCUTAneous TID  WYATT Gibson CNP        insulin lispro (HUMALOG) injection vial 0-4 Units  0-4 Units SubCUTAneous Nightly WYATT Gibson CNP        scopolamine (TRANSDERM-SCOP) transdermal patch 1 patch  1 patch TransDERmal Q72H WYATT Gibson CNP   1 patch at 02/02/23 0845    [Held by provider] rivaroxaban (XARELTO) tablet 15 mg  15 mg Oral BID  Ric Solorio MD        morphine (PF) injection 1 mg  1 mg IntraVENous Q6H PRN Armaan Manriquez MD   1 mg at 02/03/23 0554    cephALEXin (KEFLEX) capsule 500 mg  500 mg Oral 4 times per day WYATT Wells CNP   500 mg at 02/03/23 1210    oxyCODONE (ROXICODONE) immediate release tablet 2.5 mg  2.5 mg Oral Q4H PRN Ric Solorio MD Or    oxyCODONE (ROXICODONE) immediate release tablet 5 mg  5 mg Oral Q4H PRN Ric Solorio MD   5 mg at 02/03/23 0819    hydrOXYzine (VISTARIL) injection 25 mg  25 mg IntraMUSCular Q6H PRN Ric Solorio MD   25 mg at 02/02/23 2045    furosemide (LASIX) tablet 20 mg  20 mg Oral Daily Mojgan Cade MD   20 mg at 02/03/23 7307       Allergies: Allergies   Allergen Reactions    Metformin And Related Other (See Comments)     diarrhea    Codeine Nausea And Vomiting    Meperidine Hcl Nausea And Vomiting    Sulfa Antibiotics Nausea And Vomiting    Sumatriptan Nausea And Vomiting       Problem List:    Patient Active Problem List   Diagnosis Code    COVID-19 U07.1    Hypoxia R09.02    Acute respiratory failure with hypoxia (HCC) J96.01    Debility R53.81    Physical deconditioning R53.81    History of COVID-19 Z86.16    Dysarthria R47.1    Essential hypertension I10    Type [facility] diabetes mellitus with insulin [facility] E11.9, Z79.4    Obesity (BMI 30-39. 9) E66.9    History of coronary artery disease Z86.79    History of coronary artery stent placement Z95.5    Cardiomyopathy (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    Dyspnea and respiratory abnormalities R06.00, R06.89    Acute respiratory failure (HCC) J96.00    Acute hypoxemic respiratory failure (HCC) J96.01    Tracheostomy in place ([facility]) Z93.0    Hoarseness R49.0    Unstable angina ([facility]) I20.0    Chronic respiratory failure with hypoxia (HCC) J96.11    Acute chest pain R07.9    Coronary artery disease involving native heart I25.10    Acute on chronic anemia D64.9    Anxiety disorder F41.9    Gastroesophageal reflux disease K21.9    Other tracheostomy complication (HCC) Y13.03    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    Status post surgery Z98.890    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 ([facility]) E08.59    Elevated serum creatinine R79.89    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    Airway compromise J98.8    Thrombocytopenia, unspecified ([facility]) D69.6    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9    Leg swelling M79.89       Past Medical History:        Diagnosis Date    Arthritis     Coronary artery disease     COVID     Hyperlipidemia     Primary hypertension     Type 2 diabetes mellitus ([facility] 2018       Past Surgical History:        Procedure Laterality Date    CARDIAC SURGERY      CATARACT REMOVAL Bilateral      SECTION      3 times    CORONARY ANGIOPLASTY WITH STENT PLACEMENT      stenting twice    EYE SURGERY      HIATAL HERNIA REPAIR      late     HYSTERECTOMY, TOTAL ABDOMINAL (CERVIX REMOVED)      in     LARYNGOSCOPY N/A 3/22/2022    SUSPENSON LARYNGOSCOPY WITH JET VENT, DILATION performed by Jessica Alegria MD at [address] [place] [address] E 3/28/2022    SUSPENSION MICROLARYNGOSCOPY WITH BALLOON DILATION AND JET VENT, KENALOG INJECTION performed by Jessica Alegria MD at [facility] N/A 2022    Suspension Laryngoscopy  Lateral of Right True Vocal Cord, With Steroid Injection of Larynx And Tracheostomy Tube Change, debridement performed by Zulay Moore MD at [facility] N/A 8/10/2022    TRANSORAL LARYNGOPLASTY WITH LEFT PARTIAL ARYTENODECTOMY AND POSS LATERALIZATION, ADVANCEMENT FLAP RECONSTRUCTION WITH JET VENT,THERAPUTIC BRONCHOSCOPY WITH DEBRIEDMENT,WITH BALLOON DILITATION performed by Zulay Moore MD at [address] [place] [address] E 2022    Laryngoscopy with Dilation Debridement  Bronchoscopy with Dilation & Resection of Obstructing Tissues Transoral Laryngoplasty Left True Vocal Fold Lateralization left partial aryteniodectomy performed by Madeleine Salamanca MD at [address] [place] [address]      in [address] [place] [address],[facility] 4/1/2022    TRACHEOTOMY TUBE REPLACEMENT performed by Anna Orta MD at [facility] History:    Social History     Tobacco Use    Smoking status: Never    Smokeless tobacco: Never   Substance Use Topics    Alcohol use: Not Currently     Comment: drinks 1 glass of wine cooler or wine about 3 times per year                                Counseling given: Not Answered      Vital Signs (Current):   Vitals:    02/03/23 1130 02/03/23 1145 02/03/23 1200 02/03/23 1228   BP: (!) 106/56 (!) 99/57 (!) 99/57    Pulse: 87 87 89    Resp: 16 16 16    Temp: 98.4 °F (36.9 °C) 98.4 °F (36.9 °C) 98.5 °F (36.9 °C)    TempSrc:       SpO2: 97% 96% 97% 91%   Weight:       Height:                                                  BP Readings from Last 3 Encounters:   02/03/23 (!) 99/57   01/29/23 95/76   11/22/22 120/70       NPO Status:                                                                                 BMI:   Wt Readings from Last 3 Encounters:   02/01/23 210 lb (95.3 kg)   01/27/23 210 lb (95.3 kg)   11/22/22 200 lb (90.7 kg)     Body mass index is 38.41 kg/m².     CBC:   Lab Results   Component Value Date/Time    WBC 7.6 02/03/2023 04:18 AM    RBC 2.85 02/03/2023 04:18 AM    HGB 9.0 02/03/2023 04:18 AM    HCT 29.1 02/03/2023 04:18 AM    .1 02/03/2023 04:18 AM     02/03/2023 04:18 AM       CMP:   Lab Results   Component Value Date/Time     02/03/2023 04:18 AM    K 4.5 02/03/2023 04:18 AM    K 4.1 01/28/2023 05:20 AM     02/03/2023 04:18 AM    CO2 27 02/03/2023 04:18 AM    BUN 7 02/03/2023 04:18 AM    CREATININE 0.7 02/03/2023 04:18 AM    LABGLOM >60 02/03/2023 04:18 AM    GLUCOSE 102 02/03/2023 04:18 AM PROT 5.2 02/01/2023 10:50 PM    CALCIUM 8.2 02/03/2023 04:18 AM    BILITOT 0.4 02/01/2023 10:50 PM    ALKPHOS 95 02/01/2023 10:50 PM    AST 14 02/01/2023 10:50 PM    ALT 12 02/01/2023 10:50 PM       POC Tests:   Recent Labs     02/03/23  1157   POCGLU 119*       Coags:   Lab Results   Component Value Date/Time    INR 2.47 02/03/2023 04:18 AM    APTT 53.3 02/01/2023 10:50 PM       HCG (If Applicable): No results found for: PREGTESTUR, PREGSERUM, HCG, HCGQUANT     ABGs: No results found for: PHART, PO2ART, IKW2OVH, PIJ1XJE, BEART, W6GOCJSY     Type & Screen (If Applicable):  Lab Results   Component Value Date    LABRH POS 02/03/2023       Drug/Infectious Status (If Applicable):  No results found for: HIV, HEPCAB    COVID-19 Screening (If Applicable):   Lab Results   Component Value Date/Time    COVID19 NOT DETECTED 01/21/2023 04:25 AM           Anesthesia Evaluation  Patient summary reviewed and Nursing notes reviewed no history of anesthetic complications:   Airway: Mallampati: Unable to assess / NA         Tracheostomy present Dental:          Pulmonary: breath sounds clear to auscultation  (+) shortness of breath: chronic,                            ROS comment: Chronic trach   Cardiovascular:  Exercise tolerance: poor (<4 METS),   (+) hypertension:, angina:, CAD:,       ECG reviewed  Rhythm: regular  Rate: normal                    Neuro/Psych:   (+) neuromuscular disease:, psychiatric history:            GI/Hepatic/Renal:   (+) GERD:,           Endo/Other:    (+) DiabetesType II DM, , .                 Abdominal:             Vascular: Other Findings:           Anesthesia Plan      general     ASA 4       Induction: inhalational and intravenous. MIPS: Postoperative opioids intended and Prophylactic antiemetics administered. Anesthetic plan and risks discussed with patient and spouse. Plan discussed with CRNA.                     Shayla Ramos DO   2/3/2023

## 2023-02-04 LAB
ANION GAP SERPL CALC-SCNC: 10 MEQ/L (ref 8–16)
BASOPHILS ABSOLUTE: 0 THOU/MM3 (ref 0–0.1)
BASOPHILS NFR BLD AUTO: 0.3 %
BUN SERPL-MCNC: 16 MG/DL (ref 7–22)
CALCIUM SERPL-MCNC: 8 MG/DL (ref 8.5–10.5)
CHLORIDE SERPL-SCNC: 98 MEQ/L (ref 98–111)
CO2 SERPL-SCNC: 24 MEQ/L (ref 23–33)
CREAT SERPL-MCNC: 1 MG/DL (ref 0.4–1.2)
DEPRECATED RDW RBC AUTO: 56.5 FL (ref 35–45)
EOSINOPHIL NFR BLD AUTO: 0 %
EOSINOPHILS ABSOLUTE: 0 THOU/MM3 (ref 0–0.4)
ERYTHROCYTE [DISTWIDTH] IN BLOOD BY AUTOMATED COUNT: 15.4 % (ref 11.5–14.5)
GFR SERPL CREATININE-BSD FRML MDRD: > 60 ML/MIN/1.73M2
GLUCOSE BLD STRIP.AUTO-MCNC: 283 MG/DL (ref 70–108)
GLUCOSE BLD STRIP.AUTO-MCNC: 284 MG/DL (ref 70–108)
GLUCOSE BLD STRIP.AUTO-MCNC: 383 MG/DL (ref 70–108)
GLUCOSE BLD STRIP.AUTO-MCNC: 394 MG/DL (ref 70–108)
GLUCOSE SERPL-MCNC: 270 MG/DL (ref 70–108)
HCT VFR BLD AUTO: 24 % (ref 37–47)
HGB BLD-MCNC: 7.3 GM/DL (ref 12–16)
IMM GRANULOCYTES # BLD AUTO: 0.25 THOU/MM3 (ref 0–0.07)
IMM GRANULOCYTES NFR BLD AUTO: 2.5 %
INR PPP: 1.72 (ref 0.85–1.13)
LYMPHOCYTES ABSOLUTE: 0.7 THOU/MM3 (ref 1–4.8)
LYMPHOCYTES NFR BLD AUTO: 6.5 %
MAGNESIUM SERPL-MCNC: 1.9 MG/DL (ref 1.6–2.4)
MCH RBC QN AUTO: 30.8 PG (ref 26–33)
MCHC RBC AUTO-ENTMCNC: 30.4 GM/DL (ref 32.2–35.5)
MCV RBC AUTO: 101.3 FL (ref 81–99)
MONOCYTES ABSOLUTE: 0.5 THOU/MM3 (ref 0.4–1.3)
MONOCYTES NFR BLD AUTO: 4.9 %
NEUTROPHILS NFR BLD AUTO: 85.8 %
NRBC BLD AUTO-RTO: 1 /100 WBC
PLATELET # BLD AUTO: 307 THOU/MM3 (ref 130–400)
PMV BLD AUTO: 9.6 FL (ref 9.4–12.4)
POTASSIUM SERPL-SCNC: 5.3 MEQ/L (ref 3.5–5.2)
RBC # BLD AUTO: 2.37 MILL/MM3 (ref 4.2–5.4)
SEGMENTED NEUTROPHILS ABSOLUTE COUNT: 8.7 THOU/MM3 (ref 1.8–7.7)
SODIUM SERPL-SCNC: 132 MEQ/L (ref 135–145)
WBC # BLD AUTO: 10.1 THOU/MM3 (ref 4.8–10.8)

## 2023-02-04 PROCEDURE — 6370000000 HC RX 637 (ALT 250 FOR IP): Performed by: NURSE PRACTITIONER

## 2023-02-04 PROCEDURE — 2700000000 HC OXYGEN THERAPY PER DAY

## 2023-02-04 PROCEDURE — 82948 REAGENT STRIP/BLOOD GLUCOSE: CPT

## 2023-02-04 PROCEDURE — 2580000003 HC RX 258: Performed by: NURSE PRACTITIONER

## 2023-02-04 PROCEDURE — 2060000000 HC ICU INTERMEDIATE R&B

## 2023-02-04 PROCEDURE — 6360000002 HC RX W HCPCS: Performed by: NURSE PRACTITIONER

## 2023-02-04 PROCEDURE — 36415 COLL VENOUS BLD VENIPUNCTURE: CPT

## 2023-02-04 PROCEDURE — 99232 SBSQ HOSP IP/OBS MODERATE 35: CPT | Performed by: INTERNAL MEDICINE

## 2023-02-04 PROCEDURE — 94760 N-INVAS EAR/PLS OXIMETRY 1: CPT

## 2023-02-04 PROCEDURE — 80048 BASIC METABOLIC PNL TOTAL CA: CPT

## 2023-02-04 PROCEDURE — 2500000003 HC RX 250 WO HCPCS: Performed by: NURSE PRACTITIONER

## 2023-02-04 PROCEDURE — 85025 COMPLETE CBC W/AUTO DIFF WBC: CPT

## 2023-02-04 PROCEDURE — 6370000000 HC RX 637 (ALT 250 FOR IP): Performed by: INTERNAL MEDICINE

## 2023-02-04 PROCEDURE — 97162 PT EVAL MOD COMPLEX 30 MIN: CPT

## 2023-02-04 PROCEDURE — 97530 THERAPEUTIC ACTIVITIES: CPT

## 2023-02-04 PROCEDURE — 6360000002 HC RX W HCPCS: Performed by: EMERGENCY MEDICINE

## 2023-02-04 PROCEDURE — 85610 PROTHROMBIN TIME: CPT

## 2023-02-04 PROCEDURE — 83735 ASSAY OF MAGNESIUM: CPT

## 2023-02-04 PROCEDURE — 99231 SBSQ HOSP IP/OBS SF/LOW 25: CPT | Performed by: NURSE PRACTITIONER

## 2023-02-04 RX ORDER — CALCIUM GLUCONATE 10 MG/ML
1000 INJECTION, SOLUTION INTRAVENOUS ONCE
Status: COMPLETED | OUTPATIENT
Start: 2023-02-04 | End: 2023-02-04

## 2023-02-04 RX ORDER — INSULIN LISPRO 100 [IU]/ML
3 INJECTION, SOLUTION INTRAVENOUS; SUBCUTANEOUS
Status: DISCONTINUED | OUTPATIENT
Start: 2023-02-04 | End: 2023-02-05

## 2023-02-04 RX ORDER — INSULIN GLARGINE 100 [IU]/ML
25 INJECTION, SOLUTION SUBCUTANEOUS NIGHTLY
Status: DISCONTINUED | OUTPATIENT
Start: 2023-02-04 | End: 2023-02-05

## 2023-02-04 RX ORDER — DEXTROSE MONOHYDRATE 100 MG/ML
INJECTION, SOLUTION INTRAVENOUS CONTINUOUS PRN
Status: DISCONTINUED | OUTPATIENT
Start: 2023-02-04 | End: 2023-02-10 | Stop reason: HOSPADM

## 2023-02-04 RX ADMIN — SODIUM CHLORIDE, PRESERVATIVE FREE 10 ML: 5 INJECTION INTRAVENOUS at 08:34

## 2023-02-04 RX ADMIN — OXYCODONE 5 MG: 5 TABLET ORAL at 12:24

## 2023-02-04 RX ADMIN — CEPHALEXIN 500 MG: 500 CAPSULE ORAL at 04:54

## 2023-02-04 RX ADMIN — ASPIRIN 81 MG 81 MG: 81 TABLET ORAL at 08:33

## 2023-02-04 RX ADMIN — OXYCODONE 5 MG: 5 TABLET ORAL at 20:00

## 2023-02-04 RX ADMIN — MORPHINE SULFATE 1 MG: 2 INJECTION, SOLUTION INTRAMUSCULAR; INTRAVENOUS at 04:25

## 2023-02-04 RX ADMIN — INSULIN LISPRO 8 UNITS: 100 INJECTION, SOLUTION INTRAVENOUS; SUBCUTANEOUS at 16:59

## 2023-02-04 RX ADMIN — CEPHALEXIN 500 MG: 500 CAPSULE ORAL at 12:19

## 2023-02-04 RX ADMIN — INSULIN LISPRO 3 UNITS: 100 INJECTION, SOLUTION INTRAVENOUS; SUBCUTANEOUS at 16:59

## 2023-02-04 RX ADMIN — OXYCODONE 5 MG: 5 TABLET ORAL at 23:51

## 2023-02-04 RX ADMIN — GUAIFENESIN 400 MG: 200 SOLUTION ORAL at 23:52

## 2023-02-04 RX ADMIN — CEPHALEXIN 500 MG: 500 CAPSULE ORAL at 23:52

## 2023-02-04 RX ADMIN — ROSUVASTATIN 10 MG: 10 TABLET, FILM COATED ORAL at 08:33

## 2023-02-04 RX ADMIN — OXYCODONE 5 MG: 5 TABLET ORAL at 16:09

## 2023-02-04 RX ADMIN — Medication 2000 MG: at 04:55

## 2023-02-04 RX ADMIN — CEPHALEXIN 500 MG: 500 CAPSULE ORAL at 17:00

## 2023-02-04 RX ADMIN — INSULIN GLARGINE 25 UNITS: 100 INJECTION, SOLUTION SUBCUTANEOUS at 16:59

## 2023-02-04 RX ADMIN — INSULIN LISPRO 8 UNITS: 100 INJECTION, SOLUTION INTRAVENOUS; SUBCUTANEOUS at 08:21

## 2023-02-04 RX ADMIN — INSULIN LISPRO 8 UNITS: 100 INJECTION, SOLUTION INTRAVENOUS; SUBCUTANEOUS at 12:17

## 2023-02-04 RX ADMIN — RIVAROXABAN 15 MG: 15 TABLET, FILM COATED ORAL at 08:34

## 2023-02-04 RX ADMIN — SENNOSIDES 8.6 MG: 8.6 TABLET, FILM COATED ORAL at 20:01

## 2023-02-04 RX ADMIN — OXYCODONE 5 MG: 5 TABLET ORAL at 03:18

## 2023-02-04 RX ADMIN — RIVAROXABAN 15 MG: 15 TABLET, FILM COATED ORAL at 16:09

## 2023-02-04 RX ADMIN — METOPROLOL SUCCINATE 25 MG: 25 TABLET, EXTENDED RELEASE ORAL at 08:34

## 2023-02-04 RX ADMIN — CALCIUM GLUCONATE 1000 MG: 10 INJECTION, SOLUTION INTRAVENOUS at 10:29

## 2023-02-04 RX ADMIN — FUROSEMIDE 20 MG: 20 TABLET ORAL at 08:34

## 2023-02-04 ASSESSMENT — PAIN DESCRIPTION - LOCATION
LOCATION: BACK
LOCATION: HIP
LOCATION: HIP

## 2023-02-04 ASSESSMENT — PAIN DESCRIPTION - DESCRIPTORS
DESCRIPTORS: DISCOMFORT
DESCRIPTORS: ACHING
DESCRIPTORS: ACHING

## 2023-02-04 ASSESSMENT — PAIN DESCRIPTION - PAIN TYPE: TYPE: ACUTE PAIN;SURGICAL PAIN

## 2023-02-04 ASSESSMENT — PAIN SCALES - GENERAL
PAINLEVEL_OUTOF10: 8
PAINLEVEL_OUTOF10: 7
PAINLEVEL_OUTOF10: 8
PAINLEVEL_OUTOF10: 10
PAINLEVEL_OUTOF10: 5
PAINLEVEL_OUTOF10: 5
PAINLEVEL_OUTOF10: 10

## 2023-02-04 ASSESSMENT — LIFESTYLE VARIABLES
HOW OFTEN DO YOU HAVE A DRINK CONTAINING ALCOHOL: NEVER
HOW MANY STANDARD DRINKS CONTAINING ALCOHOL DO YOU HAVE ON A TYPICAL DAY: PATIENT DOES NOT DRINK

## 2023-02-04 ASSESSMENT — PAIN DESCRIPTION - ORIENTATION
ORIENTATION: LEFT
ORIENTATION: RIGHT
ORIENTATION: RIGHT

## 2023-02-04 ASSESSMENT — PAIN DESCRIPTION - FREQUENCY: FREQUENCY: INTERMITTENT

## 2023-02-04 ASSESSMENT — PAIN - FUNCTIONAL ASSESSMENT
PAIN_FUNCTIONAL_ASSESSMENT: PREVENTS OR INTERFERES SOME ACTIVE ACTIVITIES AND ADLS
PAIN_FUNCTIONAL_ASSESSMENT: PREVENTS OR INTERFERES SOME ACTIVE ACTIVITIES AND ADLS

## 2023-02-04 ASSESSMENT — PAIN DESCRIPTION - ONSET: ONSET: ON-GOING

## 2023-02-04 NOTE — PLAN OF CARE
Problem: Discharge Planning  Goal: Discharge to home or other facility with appropriate resources  Outcome: Progressing     Problem: Safety - Adult  Goal: Free from fall injury  Outcome: Progressing     Problem: Skin/Tissue Integrity  Goal: Absence of new skin breakdown  Description: 1. Monitor for areas of redness and/or skin breakdown  2. Assess vascular access sites hourly  3. Every 4-6 hours minimum:  Change oxygen saturation probe site  4. Every 4-6 hours:  If on nasal continuous positive airway pressure, respiratory therapy assess nares and determine need for appliance change or resting period. Outcome: Progressing     Problem: Neurosensory - Adult  Goal: Achieves stable or improved neurological status  Outcome: Progressing  Goal: Achieves maximal functionality and self care  Outcome: Progressing     Problem: Respiratory - Adult  Goal: Achieves optimal ventilation and oxygenation  Outcome: Progressing     Problem: Cardiovascular - Adult  Goal: Maintains optimal cardiac output and hemodynamic stability  Outcome: Progressing     Problem: Genitourinary - Adult  Goal: Absence of urinary retention  Outcome: Progressing  Goal: Urinary catheter remains patent  Outcome: Progressing     Problem: Infection - Adult  Goal: Absence of infection at discharge  Outcome: Progressing  Goal: Absence of infection during hospitalization  Outcome: Progressing     Problem: Pain  Goal: Verbalizes/displays adequate comfort level or baseline comfort level  Outcome: Progressing     Problem: Chronic Conditions and Co-morbidities  Goal: Patient's chronic conditions and co-morbidity symptoms are monitored and maintained or improved  Outcome: Progressing     Care plan reviewed with patient. Patient verbalizes understanding of the plan of care and contributes to goal setting.

## 2023-02-04 NOTE — PROGRESS NOTES
Orthopaedic Progress Note      SUBJECTIVE   Ms. Marcellus Resendez is post op day # 1     Patient notes status post right and medullary nailing femur secondary to intertrochanteric femur fracture. Hemoglobin noted to be 7.3 is a stable at this point no plan for transfusion  OBJECTIVE      Physical    VITALS:  /66   Pulse 88   Temp 98.2 °F (36.8 °C) (Oral)   Resp 16   Ht 5' 2\" (1.575 m)   Wt 227 lb 1.2 oz (103 kg)   LMP  (LMP Unknown)   SpO2 92%   BMI 41.53 kg/m²   I/O last 3 completed shifts: In: 1100 [P.O.:410; I.V.:450; Blood:240]  Out: 5053 [Urine:1350]      Does have some mild swelling about the right thigh. She is able to flex extend toes. No pain to palpation the calf. Does have pressure dressings applied to lateral aspect of the thigh.       Data  CBC:   Lab Results   Component Value Date/Time    WBC 10.1 02/04/2023 05:09 AM    HGB 7.3 02/04/2023 05:09 AM     02/04/2023 05:09 AM     BMP:    Lab Results   Component Value Date/Time     02/04/2023 05:09 AM    K 5.3 02/04/2023 05:09 AM    K 4.1 01/28/2023 05:20 AM    CL 98 02/04/2023 05:09 AM    CO2 24 02/04/2023 05:09 AM    BUN 16 02/04/2023 05:09 AM    CREATININE 1.0 02/04/2023 05:09 AM    CALCIUM 8.0 02/04/2023 05:09 AM    GLUCOSE 270 02/04/2023 05:09 AM     Uric Acid:  No components found for: URIC  PT/INR:    Lab Results   Component Value Date/Time    INR 1.72 02/04/2023 05:09 AM     Troponin:  No results found for: TROPONINI  Urine Culture:  No components found for: CURINE      Current Inpatient Medications    Current Facility-Administered Medications: 0.9 % sodium chloride infusion, , IntraVENous, PRN  aspirin chewable tablet 81 mg, 81 mg, Oral, Daily  rivaroxaban (XARELTO) tablet 15 mg, 15 mg, Oral, BID WC  albuterol sulfate HFA (PROVENTIL;VENTOLIN;PROAIR) 108 (90 Base) MCG/ACT inhaler 2 puff, 2 puff, Inhalation, Q6H PRN  Menthol lozenge 4.8 mg, 1 lozenge, Oral, Q3H PRN  guaiFENesin (ROBITUSSIN) 100 MG/5ML liquid 400 mg, 400 mg, Oral, BID PRN  insulin glargine (LANTUS) injection vial 22 Units, 22 Units, SubCUTAneous, Nightly  metoprolol succinate (TOPROL XL) extended release tablet 25 mg, 25 mg, Oral, Daily  rosuvastatin (CRESTOR) tablet 10 mg, 10 mg, Oral, Daily  senna (SENOKOT) tablet 8.6 mg, 1 tablet, Oral, Nightly  tiZANidine (ZANAFLEX) tablet 4 mg, 4 mg, Oral, Q8H PRN  sodium chloride flush 0.9 % injection 5-40 mL, 5-40 mL, IntraVENous, 2 times per day  sodium chloride flush 0.9 % injection 5-40 mL, 5-40 mL, IntraVENous, PRN  0.9 % sodium chloride infusion, , IntraVENous, PRN  polyethylene glycol (GLYCOLAX) packet 17 g, 17 g, Oral, Daily PRN  acetaminophen (TYLENOL) tablet 650 mg, 650 mg, Oral, Q6H PRN **OR** acetaminophen (TYLENOL) suppository 650 mg, 650 mg, Rectal, Q6H PRN  potassium chloride (KLOR-CON M) extended release tablet 40 mEq, 40 mEq, Oral, PRN **OR** potassium bicarb-citric acid (EFFER-K) effervescent tablet 40 mEq, 40 mEq, Oral, PRN **OR** potassium chloride 10 mEq/100 mL IVPB (Peripheral Line), 10 mEq, IntraVENous, PRN  magnesium sulfate 2000 mg in 50 mL IVPB premix, 2,000 mg, IntraVENous, PRN  insulin lispro (HUMALOG) injection vial 0-8 Units, 0-8 Units, SubCUTAneous, TID WC  insulin lispro (HUMALOG) injection vial 0-4 Units, 0-4 Units, SubCUTAneous, Nightly  scopolamine (TRANSDERM-SCOP) transdermal patch 1 patch, 1 patch, TransDERmal, Q72H  morphine (PF) injection 1 mg, 1 mg, IntraVENous, Q6H PRN  cephALEXin (KEFLEX) capsule 500 mg, 500 mg, Oral, 4 times per day  oxyCODONE (ROXICODONE) immediate release tablet 2.5 mg, 2.5 mg, Oral, Q4H PRN **OR** oxyCODONE (ROXICODONE) immediate release tablet 5 mg, 5 mg, Oral, Q4H PRN  hydrOXYzine (VISTARIL) injection 25 mg, 25 mg, IntraMUSCular, Q6H PRN  furosemide (LASIX) tablet 20 mg, 20 mg, Oral, Daily        PLAN    Weightbearing as tolerated  Social work discussing discharge planning

## 2023-02-04 NOTE — PROGRESS NOTES
Physician Progress Note      Luther Sanchez  CSN #:                  [id number]  :                       1950  ADMIT DATE:       2023 10:03 PM  [address] [place] DATE:  RESPONDING  PROVIDER #:        Laine Montalvo MD          QUERY TEXT:    Dr Precious Bland,    Pt admitted with Acute CHF Exacerbation and has pulmonary embolism documented. If possible, please document in progress notes and discharge summary if you   are evaluating and/or treating any of the following: The medical record reflects the following:  Risk Factors:  2023 She was discovered to have bilateral DVTs and PEs. Clinical Indicators: Per HP: Patient still has non-occlusive PE; could have   failed Xarelto therapy while outpatient  Treatment: Xarelto  Options provided:  -- Acute pulmonary embolism under active treatment. -- Subacute pulmonary embolism under active treatment. -- Chronic pulmonary embolism  -- Other - I will add my own diagnosis  -- Disagree - Not applicable / Not valid  -- Disagree - Clinically unable to determine / Unknown  -- Refer to Clinical Documentation Reviewer    PROVIDER RESPONSE TEXT:    This patient has Acute Pulmonary Embolism under active treatment. Query created by: Pascale Soto on 2/3/2023 7:09 AM      QUERY TEXT:    Dr Precious Bland,    Pt admitted with right IT femur fracture and noted to have PE and bilateral   DVT and is maintained on XARELTO. Per ED Physician: Supratherapeutic INR. Per   Ortho potential OR tomorrow pending INR and medical optimization from   consulting services. If possible, please document in progress notes and   discharge summary if you are evaluating and/or treating any of the following: The medical record reflects the following:  Risk Factors: PE and bilateral DVT  Clinical Indicators: INR 3.83. Per ED Physician: Supratherapeutic INR. Per   Ortho potential OR tomorrow pending INR and medical optimization from   consulting services.   Treatment: XARELTO & VIT K  Options provided:  -- Supratherapeutic INR.  -- Secondary hypercoagulable state in a patient with PE and bilateral DVT  -- Other - I will add my own diagnosis  -- Disagree - Not applicable / Not valid  -- Disagree - Clinically unable to determine / Unknown  -- Refer to Clinical Documentation Reviewer    PROVIDER RESPONSE TEXT:    This patient has Supratherapeutic INR. Query created by: Chilo Guzmán on 2/3/2023 7:29 AM      QUERY TEXT:    Dr Desiree Chery,    Pt admitted with BLE edema. Pt noted to have PE and bilateral DVT. Per HP:   Query Acute on chronic diastolic HFpEF: Pro . If possible, please   document in progress notes and discharge summary the present on admission   status of Acute on chronic diastolic HFpEF:    The medical record reflects the following:  Risk Factors: PE and bilateral DVT  Clinical Indicators: Per HP: Query Acute on chronic diastolic HFpEF: Pro BNP   256, CXR New trace bilateral pleural effusions. Treatment: Bumex IV x1 & Lasix IV x1. Lasix 20 PO Daily.   Options provided:  -- Acute on chronic diastolic HFpEF was present at the time of the order to   admit to the hospital  -- BLE edema due to Bilateral DVT & Acute on chronic diastolic HFpEF ruled   out.  -- Other - I will add my own diagnosis  -- Disagree - Not applicable / Not valid  -- Disagree - Clinically unable to determine / Unknown  -- Refer to Clinical Documentation Reviewer    PROVIDER RESPONSE TEXT:    Acute on chronic diastolic HFpEF was present at the time of the order to admit   to the hospital.    Query created by: Chilo Guzmán on 2/3/2023 7:35 AM      Electronically signed by:  Joshua Perry MD 2/3/2023 9:52 PM

## 2023-02-04 NOTE — PROGRESS NOTES
Attending supervising physicians attestation statement:       I Discussed the findings and plans with the resident physician  personally   and agree with the IM resident'S note as outlined . Also spoke with the staff  Regarding care plans and recommendations. Due to elevated sugars Lantus insulin and sliding scale coverage has been increased, as patient now tolerating diet. Xarelto has been restarted by Ortho post right hip surgery, and physical therapy is about to start, may need IPR versus nursing home rehab based on the progress. Electronically signed by Hoang Talbert MD on 2/4/2023 at 5:15 PM        Hospitalist Progress Note    Patient:  Link Carrier      Unit/Bed:4-24/024-A    YOB: 1950    MRN: [id number]       Acct: [de-identified]     PCP: Kostas Muller MD    Date of Admission: 2/1/2023    Assessment/Plan:    Acute right intertrochanteric femur fracture  -Ortho consult  -Pain management w/ PRN morphine 1mg Q6  -S/p R cephalomedullary nail by Dr. Morgan Signs 2/3/23  Supra therapeutic INR  -Xarelto re started by Ortho today  - Vitamin K PO  -q24h INR  -1 unit FFP ordered and consent obtained  Stable PE  -CTA confirming b/l non-obstructive PEs   -Will resume anticoagulation after surgery  Subclinical Hypothyroidism  -Consider synthroid treatment  B/L LE edema   -Elevation for edema   -No compression due to DVTs  CAD  -Continue ASA, BB, statins  Acute on chronic macrocytic anemia  - Currently stable but will monitor and transfuse if Hgb drops below 7.  -Hgb drop from 9-7.3. Will continue to monitor. IDDM2   -Continue lantus   -Continue medium dose SSI   -POC glucose checks  Elevated Troponin  -Stable since 1/21/23   Chronic Respiratory failure  -Pt has Hx of tracheostomy and tracheal tenosis  -O2 at baseline, continue O2 via trach mask. Obesity   -BMI 38.41   -Discuss lifestyle changes. Essential HTN  -Controlled  Hyperkalemia  -Suspected hemolysis.  Pt receiving lasix and insulin. Ca+ administered and will follow. Expected discharge date:      Disposition:    [x] Home       [] TCU       [] Rehab       [] Psych       [] SNF       [] [place]       [] Other-    Chief Complaint: Lower extremity edema    Hospital Course:  Griselda Sabillon is a 73yoF with PMH of recent PE and b/l DVT, macrocytic anemia, CAD, DM2, tracheostomy dependent, obesity, and HTN who presents for leg swelling, nausea, and constipation. She was recently discharged after an 8 day admission for DVTs and PEs. She was placed on Xarelto when discharged and stated that she has been compliant and only missed one dose. She returned due to chronic constipation, feeling ''bloated'', nausea, and LE edema. While being evaluated in the ED the pt had a mechanical fall and developed a R femur fracture. She was found to have supratheraputic INR at 3.83. CT head and C-spine both clear. 2/2/23  -Her pain is being managed with morphine and her Xarelto is being held. She was also given Vitamin K to reduce her INR. -Urology was consulted to place a werner due to concerns of urinary retention. Resource RN, and multiple other nurses, was unable to successfully place werner. Will f/u with Urology. 2/3/23  -Pt had improvement in INR but it was still elevated over 2. FFP was ordered and consent was obtained after discussing risks and benefits with patient and her .     -Pt will go to surgery today after INR improves. 2/4/23  -Pt s/p cephalomedullary nail on R IT femur fracture by Dr. Philomena Soriano in Hgb, will continue to monitor and transfuse as needed. -Urology recommends to continue werner until pt is mobile, having good BMs and has improvement in swelling with completed diuresis.    -Pt given 1g Calcium gluc due to hypocalcemia and mild increase in K+. Suspecting K+ is secondary to hemolysis. Subjective (past 24 hours): Pt was well appearing and eating breakfast in bed.  She stated that her pain is better controlled today. She is in no acute distress and speaking completed sentences during exam.      ROS (12 point review of systems completed. Pertinent positives noted. Otherwise ROS is negative). Medications:  Reviewed    Infusion Medications    dextrose      sodium chloride      sodium chloride       Scheduled Medications    calcium gluconate  1,000 mg IntraVENous Once    aspirin  81 mg Oral Daily    rivaroxaban  15 mg Oral BID WC    insulin glargine  22 Units SubCUTAneous Nightly    metoprolol succinate  25 mg Oral Daily    rosuvastatin  10 mg Oral Daily    senna  1 tablet Oral Nightly    sodium chloride flush  5-40 mL IntraVENous 2 times per day    insulin lispro  0-8 Units SubCUTAneous TID WC    insulin lispro  0-4 Units SubCUTAneous Nightly    scopolamine  1 patch TransDERmal Q72H    cephALEXin  500 mg Oral 4 times per day    furosemide  20 mg Oral Daily     PRN Meds: glucose, dextrose bolus **OR** dextrose bolus, glucagon (rDNA), dextrose, sodium chloride, albuterol sulfate HFA, Menthol, guaiFENesin, tiZANidine, sodium chloride flush, sodium chloride, polyethylene glycol, acetaminophen **OR** acetaminophen, potassium chloride **OR** potassium alternative oral replacement **OR** potassium chloride, magnesium sulfate, morphine, oxyCODONE **OR** oxyCODONE, hydrOXYzine      Intake/Output Summary (Last 24 hours) at 2/4/2023 1042  Last data filed at 2/4/2023 0313  Gross per 24 hour   Intake 1050 ml   Output 650 ml   Net 400 ml       Diet:  ADULT DIET; Regular; 4 carb choices (60 gm/meal)    Exam:  BP (!) 117/57   Pulse 99   Temp 99.3 °F (37.4 °C) (Oral)   Resp 16   Ht 5' 2\" (1.575 m)   Wt 227 lb 1.2 oz (103 kg)   LMP  (LMP Unknown)   SpO2 95%   BMI 41.53 kg/m²     General appearance: No apparent distress, appears stated age and cooperative. HEENT: Pupils equal, round, and reactive to light. Conjunctivae/corneas clear. Neck: Supple, with full range of motion. No jugular venous distention. Trachea midline. Respiratory:  Via tracheostomy, Normal respiratory effort. Clear to auscultation, bilaterally without Rales/Wheezes/Rhonchi. Cardiovascular: Regular rate and rhythm with normal  Abdomen: Soft, mild periumbilical tenderness, non-distended  Musculoskeletal: R hip tenderness, B/l +2 pitting pedal edema, s/p R hip surgery. Skin: Skin color, texture, turgor normal.  No rashes or lesions. Incision on R hip healing well, no obvious signs of infection. Neurologic:  Neurovascularly intact without any focal sensory/motor deficits. Cranial nerves: II-XII intact, grossly non-focal.  Psychiatric: Alert and oriented, thought content appropriate, normal insight  Capillary Refill: Brisk,< 3 seconds   Peripheral Pulses: +2 palpable, equal bilaterally       Labs:   Recent Labs     02/01/23  2250 02/03/23  0418 02/04/23  0509   WBC 6.4 7.6 10.1   HGB 8.8* 9.0* 7.3*   HCT 26.7* 29.1* 24.0*    289 307     Recent Labs     02/02/23  0810 02/03/23  0418 02/04/23  0509    140 132*   K 3.7 4.5 5.3*    103 98   CO2 23 27 24   BUN 4* 7 16   CREATININE 0.7 0.7 1.0   CALCIUM 8.0* 8.2* 8.0*     Recent Labs     02/01/23  2250   AST 14   ALT 12   BILIDIR <0.2   BILITOT 0.4   ALKPHOS 95     Recent Labs     02/02/23  0820 02/03/23  0418 02/04/23  0509   INR 3.48* 2.47* 1.72*     No results for input(s): Nelly Damian in the last 72 hours. Microbiology:      Urinalysis:      Lab Results   Component Value Date/Time    NITRU NEGATIVE 02/02/2023 05:10 PM    WBCUA 25-50 02/02/2023 02:10 PM    BACTERIA FEW 02/02/2023 02:10 PM    RBCUA 5-10 02/02/2023 02:10 PM    BLOODU NEGATIVE 02/02/2023 05:10 PM    SPECGRAV >1.030 02/02/2023 02:10 PM    GLUCOSEU NEGATIVE 02/02/2023 05:10 PM       Radiology:  XR FEMUR RIGHT (MIN 2 VIEWS)   Final Result   Intraoperative imaging demonstrating open reduction internal fixation right hip            **This report has been created using voice recognition software.   It may contain minor errors which are inherent in voice recognition technology. **      Final report electronically signed by Dr. Kayleen Marquez on 2/3/2023 3:02 PM      FLUORO FOR SURGICAL PROCEDURES   Final Result      XR CHEST PORTABLE   Final Result   1. Marked cardiomegaly. Right arm PICC line, catheter tip at cavoatrial projection. . Tracheostomy tube in place. 2. No acute findings. No infiltrates or effusions are seen. **This report has been created using voice recognition software. It may contain minor errors which are inherent in voice recognition technology. **      Final report electronically signed by Dr. Mikayla Celaya on 2/2/2023 2:20 PM      XR ABDOMEN (KUB) (SINGLE AP VIEW)   Final Result   Impression:    Resolved constipation. Right femoral intertrochanteric fracture. This document has been electronically signed by: Baltazar Munguia. Liza Aschoff on    02/02/2023 05:35 AM      CT CERVICAL SPINE WO CONTRAST   Final Result    No fracture. **This report has been created using voice recognition software. It may contain minor errors which are inherent in voice recognition technology. **      Final report electronically signed by Dr. Lady Syed on 2/2/2023 7:38 AM      CT HEAD WO CONTRAST   Final Result    No evidence of an acute process. **This report has been created using voice recognition software. It may contain minor errors which are inherent in voice recognition technology. **      Final report electronically signed by Dr. Lady Syed on 2/2/2023 7:25 AM      XR HIP 2-3 VW W PELVIS RIGHT   Final Result   Impression:   Acute right femoral intertrochanteric fracture, with subtrochanteric    extension. This document has been electronically signed by: Natalio Ag MD on    02/02/2023 05:01 AM      XR CHEST 1 VIEW   Final Result   Impression:   No acute cardiopulmonary disease. Cardiomegaly.       This document has been electronically signed by: Natalio Ag MD on    02/02/2023 05:02 AM      CTA CHEST W WO CONTRAST   Final Result   Impression:   1. Stable bilateral nonocclusive pulmonary emboli. Negative for CT    evidence of right heart strain or thrombus propagation. No new pulmonary    emboli since the prior study 1/21/23. 2. New trace bilateral pleural effusions. 3. Nonemergent/incidental findings as above. This document has been electronically signed by: Anna Cabezas MD on    02/02/2023 02:13 AM      All CTs at this facility use dose modulation techniques and iterative    reconstructions, and/or weight-based dosing   when appropriate to reduce radiation to a low as reasonably achievable. 3D Post-processing was performed on this study.           DVT prophylaxis: [] Lovenox                                 [] SCDs                                 [] SQ Heparin                                 [] Encourage ambulation           [] Already on Anticoagulation     Code Status: Full Code    PT/OT Eval Status:     Tele:   [] yes             [x] no    Electronically signed by Stacey Fenrandez MD on 2/4/2023 at 10:42 AM

## 2023-02-04 NOTE — PROGRESS NOTES
[address] [facility] PHYSICAL THERAPY  EVALUATION  [facility] ICU STEPDOWN TELEMETRY 4K - [id number]/857-T    Time In: 5661  Time Out: 1458  Timed Code Treatment Minutes: 18 Minutes  Minutes: 27          Date: 2023  Patient Name: Nabila Calvo,  Gender:  female        MRN: [id number]  : 1950  (81 y.o.)      Referring Practitioner: WYATT Tate CNP  Diagnosis: acute diastolic heart failure  Additional Pertinent Hx: per EMR Favio Thompson is a 81 y.o. female who presents for increasing leg swelling. Patient was just discharged from the hospital on 2023. She was discovered to have bilateral DVTs and PEs. She is supposed to be on rivaroxaban. She states she is taking her medication. She called the home health nurse tonight who instructed her to come in for evaluation and treatment. Swelling has been ongoing for quite some time. Patient comes in with tracheostomy in place. She had COVID last year. She is on 1 to 2 L of oxygen 24 hours a day. Patient was discharged home, she stated that she continue to feel worse so she decided to come in [facility] for evaluation and treatment. Restrictions/Precautions:  Restrictions/Precautions: General Precautions, Fall Risk, Weight Bearing  Right Lower Extremity Weight Bearing: Weight Bearing As Tolerated  Position Activity Restriction  Other position/activity restrictions: tracheostomy/collar with moist air, 1-2 L at baseline    Subjective:  Chart Reviewed: Yes  Patient assessed for rehabilitation services?: Yes  Family / Caregiver Present: Yes  Subjective: Ok to see pt per nursing.  Pt in bed when PT arrived, agreeable to PT session with min encouragement, tech present to help assist.    General:  Overall Orientation Status: Within Functional Limits  Orientation Level: Oriented X4  Vision: Within Functional Limits  Hearing: Within functional limits       Pain: 7/10: R hip, pt had pain meds prior to session    Vitals:  tech assess vitals during session, WNL's    Social/Functional History:    Lives With: Spouse  Type of Home: House  Home Layout: One level (with basement)  Home Access: Ramped entrance  Home Equipment: Roetta Pro, rolling     Bathroom Shower/Tub: Walk-in shower  Bathroom Toilet: Standard  Bathroom Equipment: Built-in shower seat, Commode       ADL Assistance: Needs assistance  Homemaking Assistance: Needs assistance  Ambulation Assistance: Independent  Transfer Assistance: Independent    Active : No     Additional Comments: pt amb short distances in the home,  able to assist as needed. OBJECTIVE:  Range of Motion:  Right Lower Extremity: Impaired - deconditioned  Left Lower Extremity: WFL    Strength:  Bilateral Lower Extremity: Impaired - grossly deconditioned    Balance:  Static Sitting Balance:  Contact Guard Assistance, Moderate Assistance, X 1, with cues for safety, with verbal cues   Initially required mod A, once more stable required CGA for safety. Pt tolerated about 7 min on EOB. Bed Mobility:  Rolling to Right: Moderate Assistance, X 1, with rail, with verbal cues    Supine to Sit: Moderate Assistance, X 2, with head of bed raised, with rail, with verbal cues , with increased time for completion  Sit to Supine: Moderate Assistance, X 2   Scooting: Dependent with hercules sheet, required max A to scoot towards EOB for foot placement  Assist for  B LE to place on EOB  and back into bed for support, cues for technique with fair demo. Pt completed rolling to place pillow at end of session for comfort and support. Transfers:  Not Tested  Declined at this time due to increased pain    Ambulation:  Not Tested      Functional Outcome Measures: Completed  AM-PAC Inpatient Mobility without Stair Climbing Raw Score : 7  AM-PAC Inpatient without Stair Climbing T-Scale Score : 28.66    ASSESSMENT:  Activity Tolerance:  Patient tolerance of  treatment: fair.  Decreased tolerance due to increased pain, overall weakness and decreased endurance with quick fatigue. Treatment Initiated: Treatment and education initiated within context of evaluation. Evaluation time included review of current medical information, gathering information related to past medical, social and functional history, completion of standardized testing, formal and informal observation of tasks, assessment of data and development of plan of care and goals. Treatment time included skilled education and facilitation of tasks to increase safety and independence with functional mobility for improved independence and quality of life. Assessment: Body Structures, Functions, Activity Limitations Requiring Skilled Therapeutic Intervention: Decreased functional mobility , Decreased endurance, Increased pain, Decreased posture, Decreased balance, Decreased tolerance to work activity, Decreased strength  Assessment: Slim Hager is a 81 y.o. female who presents with the deficits stated previously. Pt requires 1-2 person assist for functional tasks for bed tasks and seated tasks only. Pt cont to require skilled PT services to increase IND with functional tasks and progress towards PLOF to return to home environment safely. Therapy Prognosis: Good    Requires PT Follow-Up: Yes    Discharge Recommendations:  Discharge Recommendations: Continue to assess pending progress, Patient would benefit from continued therapy after discharge, at this time, do not think pt will be able to tolerate 3 hours of therapy a day, recommend SNF. Patient Education:      .     Patient Education  Education Given To: Patient, Family  Education Provided: Role of Therapy, Plan of Care, Precautions  Education Provided Comments: d/c planning-request inpatient rehab  Education Method: Verbal  Education Outcome: Verbalized understanding, Demonstrated understanding, Continued education needed       Equipment Recommendations:  Equipment Needed: No    Plan:  Current Treatment Recommendations: Strengthening, Balance training, Functional mobility training, Neuromuscular re-education, Transfer training, Endurance training, Equipment evaluation, education, & procurement, Patient/Caregiver education & training, Safety education & training, Therapeutic activities, Home exercise program  General Plan:  (6x O)    Goals:  Patient Goals : rehab and then home with   Short Term Goals  Time Frame for Short Term Goals: by discharge  Short Term Goal 1: Pt will demo rolling L and R in bed with CGA to improve positioning. Short Term Goal 2: Pt will demo supine to/from sit transfers with mod A x1 with modifications as needed to progress with mobility. Short Term Goal 3: Pt will tolerate 10 min sitting on EOB wtih S to complete functional tasks and progress with mobility. Short Term Goal 4: Pt will tolerate 10-20 reps of ther ex to increase overall mobility. Short Term Goal 5: PT to assess transfers next session. Long Term Goals  Time Frame for Long Term Goals : NA due to short ELOS    Following session, patient left in safe position with all fall risk precautions in place. Pt in bed  following session, all needs and call light in reach, alarm on.

## 2023-02-04 NOTE — PLAN OF CARE
Problem: Discharge Planning  Goal: Discharge to home or other facility with appropriate resources  2/4/2023 0032 by Latrice Jean RN  Outcome: Progressing  Flowsheets (Taken 2/3/2023 2103)  Discharge to home or other facility with appropriate resources:   Identify barriers to discharge with patient and caregiver   Arrange for needed discharge resources and transportation as appropriate   Identify discharge learning needs (meds, wound care, etc)   Arrange for interpreters to assist at discharge as needed   Refer to discharge planning if patient needs post-hospital services based on physician order or complex needs related to functional status, cognitive ability or social support system  2/3/2023 1631 by Ministerio Feng RN  Outcome: Progressing     Problem: Safety - Adult  Goal: Free from fall injury  2/4/2023 0032 by Latrice Jaen RN  Outcome: Progressing  Flowsheets (Taken 2/4/2023 0032)  Free From Fall Injury: Instruct family/caregiver on patient safety  2/3/2023 1631 by Ministerio Feng RN  Outcome: Progressing     Problem: Skin/Tissue Integrity  Goal: Absence of new skin breakdown  Description: 1. Monitor for areas of redness and/or skin breakdown  2. Assess vascular access sites hourly  3. Every 4-6 hours minimum:  Change oxygen saturation probe site  4. Every 4-6 hours:  If on nasal continuous positive airway pressure, respiratory therapy assess nares and determine need for appliance change or resting period. 2/4/2023 0032 by Latrice Jean RN  Outcome: Progressing  Note: No new skin breakdown this shift. Patient is assisted with turning every two hours and as needed.     2/3/2023 1631 by Ministerio Feng RN  Outcome: Progressing     Problem: Neurosensory - Adult  Goal: Achieves stable or improved neurological status  2/4/2023 0032 by Latrice Jean RN  Outcome: Progressing  Flowsheets (Taken 2/3/2023 2103)  Achieves stable or improved neurological status:   Assess for and report changes in neurological status   Initiate measures to prevent increased intracranial pressure   Maintain blood pressure and fluid volume within ordered parameters to optimize cerebral perfusion and minimize risk of hemorrhage   Monitor temperature, glucose, and sodium.  Initiate appropriate interventions as ordered  2/3/2023 1631 by Ministerio Feng RN  Outcome: Progressing  Goal: Achieves maximal functionality and self care  2/4/2023 0032 by Latrice Jean RN  Outcome: Progressing  Flowsheets (Taken 2/3/2023 2103)  Achieves maximal functionality and self care:   Monitor swallowing and airway patency with patient fatigue and changes in neurological status   Encourage and assist patient to increase activity and self care with guidance from physical therapy/occupational therapy   Encourage visually impaired, hearing impaired and aphasic patients to use assistive/communication devices  2/3/2023 1631 by Ministerio Feng RN  Outcome: Progressing     Problem: Respiratory - Adult  Goal: Achieves optimal ventilation and oxygenation  2/4/2023 0032 by Latrice Jean RN  Outcome: Progressing  Flowsheets (Taken 2/3/2023 2103)  Achieves optimal ventilation and oxygenation:   Assess for changes in respiratory status   Assess for changes in mentation and behavior   Position to facilitate oxygenation and minimize respiratory effort   Oxygen supplementation based on oxygen saturation or arterial blood gases   Encourage broncho-pulmonary hygiene including cough, deep breathe, incentive spirometry   Assess the need for suctioning and aspirate as needed   Respiratory therapy support as indicated   Assess and instruct to report shortness of breath or any respiratory difficulty  2/3/2023 1631 by Ministerio Feng RN  Outcome: Progressing  2/3/2023 1235 by Linda Carlos RCP  Outcome: Progressing     Problem: Cardiovascular - Adult  Goal: Maintains optimal cardiac output and hemodynamic stability  2/4/2023 0032 by Sweta Garcia RN  Outcome: Progressing  Flowsheets (Taken 2/3/2023 2103)  Maintains optimal cardiac output and hemodynamic stability:   Monitor blood pressure and heart rate   Monitor urine output and notify Licensed Independent Practitioner for values outside of normal range   Assess for signs of decreased cardiac output   Administer fluid and/or volume expanders as ordered   Administer vasoactive medications as ordered  2/3/2023 1631 by Amelia Garza RN  Outcome: Progressing     Problem: Pain  Goal: Verbalizes/displays adequate comfort level or baseline comfort level  2/4/2023 0032 by Sweta Garcia RN  Outcome: Progressing  Flowsheets (Taken 2/3/2023 0054)  Verbalizes/displays adequate comfort level or baseline comfort level:   Encourage patient to monitor pain and request assistance   Assess pain using appropriate pain scale   Administer analgesics based on type and severity of pain and evaluate response   Implement non-pharmacological measures as appropriate and evaluate response   Consider cultural and social influences on pain and pain management   Notify Licensed Independent Practitioner if interventions unsuccessful or patient reports new pain  2/3/2023 1631 by Amelia Garza RN  Outcome: Progressing     Problem: Chronic Conditions and Co-morbidities  Goal: Patient's chronic conditions and co-morbidity symptoms are monitored and maintained or improved  2/4/2023 0032 by Sweta Garcia RN  Outcome: Progressing  Flowsheets (Taken 2/3/2023 2103)  Care Plan - Patient's Chronic Conditions and Co-Morbidity Symptoms are Monitored and Maintained or Improved:   Monitor and assess patient's chronic conditions and comorbid symptoms for stability, deterioration, or improvement   Collaborate with multidisciplinary team to address chronic and comorbid conditions and prevent exacerbation or deterioration   Update acute care plan with appropriate goals if chronic or comorbid symptoms are exacerbated and prevent overall improvement and discharge  2/3/2023 1631 by Ki Smith RN  Outcome: Progressing     Problem: Genitourinary - Adult  Goal: Absence of urinary retention  2/4/2023 0032 by Brionna Lambert RN  Outcome: Progressing  Flowsheets  Taken 2/4/2023 0032  Absence of urinary retention:   Assess patients ability to void and empty bladder   Monitor intake/output and perform bladder scan as needed  Taken 2/3/2023 2103  Absence of urinary retention:   Assess patients ability to void and empty bladder   Monitor intake/output and perform bladder scan as needed  2/3/2023 1631 by Ki Smith RN  Outcome: Progressing  Goal: Urinary catheter remains patent  2/4/2023 0032 by Brionna Lambert RN  Outcome: Progressing  Flowsheets  Taken 2/4/2023 0032  Urinary catheter remains patent: Assess patency of urinary catheter  Taken 2/3/2023 2103  Urinary catheter remains patent: Assess patency of urinary catheter  2/3/2023 1631 by Ki Smith RN  Outcome: Progressing     Problem: Infection - Adult  Goal: Absence of infection at discharge  2/4/2023 0032 by Brionna Lambert RN  Outcome: Progressing  Flowsheets (Taken 2/3/2023 2103)  Absence of infection at discharge:   Assess and monitor for signs and symptoms of infection   Monitor lab/diagnostic results   Monitor all insertion sites i.e., indwelling lines, tubes and drains   [place] appropriate cooling/warming therapies per order   Administer medications as ordered   Instruct and encourage patient and family to use good hand hygiene technique   Identify and instruct in appropriate isolation precautions for identified infection/condition  2/3/2023 1631 by Ki Smith RN  Outcome: Progressing  Goal: Absence of infection during hospitalization  2/4/2023 0032 by Brionna Lambert RN  Outcome: Progressing  Flowsheets (Taken 2/3/2023 2103)  Absence of infection during hospitalization:   Assess and monitor for signs and symptoms of infection   Monitor lab/diagnostic results   Monitor all insertion sites i.e., indwelling lines, tubes and drains   [employer] appropriate cooling/warming therapies per order   Administer medications as ordered   Instruct and encourage patient and family to use good hand hygiene technique   Identify and instruct in appropriate isolation precautions for identified infection/condition  2/3/2023 1631 by Cesilia Torres RN  Outcome: Progressing   Care plan reviewed with patient. Patient verbalized understanding of the plan of care and contribute to goal setting.

## 2023-02-04 NOTE — PROGRESS NOTES
Urology Progress Note      Urology Impression/Plan:  Acute on possible chronic urinary retention-werner placed 23 with 800 ml residual returned. Keflex 3 days for prophylaxis from multiple catheter attempts/ manipulation. Given difficulty of placement, concern for chronic component-recommend continue werner catheter until pt more mobile, having good BMs, diuresis completed, and soft tissue swelling improved. Please call urology prior to removal.  Pt will need urology follow up at d/c. Urology following peripherally. Call with any questions/concerns. Chief Complaint: Left swelling, n/v.  Reason for consultation: urinary retention and werner placement    Subjective: Werner draining clear yellow urine. Pt denies pain or complaints at this time. Diet:  ADULT DIET;  Regular; 4 carb choices (60 gm/meal)   Activity:  Up with assistance, Weight Bearing as Tolerated         Vitals:  BP (!) 117/57   Pulse 99   Temp 99.3 °F (37.4 °C) (Oral)   Resp 16   Ht 5' 2\" (1.575 m)   Wt 227 lb 1.2 oz (103 kg)   LMP  (LMP Unknown)   SpO2 95%   BMI 41.53 kg/m²   Temp  Av.4 °F (36.9 °C)  Min: 96.7 °F (35.9 °C)  Max: 99.3 °F (37.4 °C)    Intake/Output Summary (Last 24 hours) at 2023 0236  Last data filed at 2023 8287  Gross per 24 hour   Intake 1050 ml   Output 650 ml   Net 400 ml       Social History     Socioeconomic History    Marital status:      Spouse name: bailee     Number of children: 3    Years of education: Not on file    Highest education level: Not on file   Occupational History    Not on file   Tobacco Use    Smoking status: Never    Smokeless tobacco: Never   Vaping Use    Vaping Use: Never used    Passive vaping exposure: Yes   Substance and Sexual Activity    Alcohol use: Not Currently     Comment: drinks 1 glass of wine cooler or wine about 3 times per year    Drug use: Never    Sexual activity: Not Currently   Other Topics Concern    Not on file   Social History Narrative Not on file     Social Determinants of Health     Financial Resource Strain: Not on file   Food Insecurity: Not on file   Transportation Needs: Not on file   Physical Activity: Not on file   Stress: Not on file   Social Connections: Not on file   Intimate Partner Violence: Not on file   Housing Stability: Not on file     Family History   Problem Relation Age of Onset    Parkinson's Disease Father     Lung Cancer Father      Allergies   Allergen Reactions    Metformin And Related Other (See Comments)     diarrhea    Codeine Nausea And Vomiting    Meperidine Hcl Nausea And Vomiting    Sulfa Antibiotics Nausea And Vomiting    Sumatriptan Nausea And Vomiting         Constitutional: Alert and oriented times x3, no acute distress, and cooperative to examination with appropriate mood and affect. HEENT:   Head:         Normocephalic and atraumatic. Mucous membranes are normal.   Eyes:         EOM are normal. No scleral icterus. Nose:    The external appearance of the nose is normal  Ears: The ears appear normal to external inspection. Cardiovascular:       Normal rate, regular rhythm. Pulmonary/Chest:  Normal respiratory rate and rhythm. No use of accessory muscles. O2 via trach collar  Abdominal:          Soft. No tenderness. Active bowel sounds. Genitalia:    Wen catheter draining clear yellow urine  Musculoskeletal:    Normal range of motion. Exhibits no edema or tenderness of lower extremities. Extremities:    No cyanosis, clubbing, or edema present. Neurological:    Alert and oriented.      Labs:  WBC:    Lab Results   Component Value Date/Time    WBC 10.1 02/04/2023 05:09 AM     Hemoglobin/Hematocrit:    Lab Results   Component Value Date/Time    HGB 7.3 02/04/2023 05:09 AM    HCT 24.0 02/04/2023 05:09 AM     BMP:    Lab Results   Component Value Date/Time     02/04/2023 05:09 AM    K 5.3 02/04/2023 05:09 AM    K 4.1 01/28/2023 05:20 AM    CL 98 02/04/2023 05:09 AM    CO2 24 02/04/2023 05:09 AM    BUN 16 02/04/2023 05:09 AM    LABALBU 3.1 02/01/2023 10:50 PM    CREATININE 1.0 02/04/2023 05:09 AM    CALCIUM 8.0 02/04/2023 05:09 AM    LABGLOM >60 02/04/2023 05:09 AM       WYATT Calderon - CNP  02/04/23 9:58 AM  Urology

## 2023-02-05 LAB
ANION GAP SERPL CALC-SCNC: 7 MEQ/L (ref 8–16)
BACTERIA SPEC AEROBE CULT: NORMAL
BUN SERPL-MCNC: 26 MG/DL (ref 7–22)
CALCIUM SERPL-MCNC: 8.2 MG/DL (ref 8.5–10.5)
CHLORIDE SERPL-SCNC: 97 MEQ/L (ref 98–111)
CO2 SERPL-SCNC: 27 MEQ/L (ref 23–33)
CREAT SERPL-MCNC: 0.8 MG/DL (ref 0.4–1.2)
GFR SERPL CREATININE-BSD FRML MDRD: > 60 ML/MIN/1.73M2
GLUCOSE BLD STRIP.AUTO-MCNC: 232 MG/DL (ref 70–108)
GLUCOSE BLD STRIP.AUTO-MCNC: 241 MG/DL (ref 70–108)
GLUCOSE BLD STRIP.AUTO-MCNC: 289 MG/DL (ref 70–108)
GLUCOSE BLD STRIP.AUTO-MCNC: 349 MG/DL (ref 70–108)
GLUCOSE SERPL-MCNC: 292 MG/DL (ref 70–108)
MAGNESIUM SERPL-MCNC: 1.9 MG/DL (ref 1.6–2.4)
POTASSIUM SERPL-SCNC: 4.9 MEQ/L (ref 3.5–5.2)
SODIUM SERPL-SCNC: 131 MEQ/L (ref 135–145)

## 2023-02-05 PROCEDURE — 80048 BASIC METABOLIC PNL TOTAL CA: CPT

## 2023-02-05 PROCEDURE — 83735 ASSAY OF MAGNESIUM: CPT

## 2023-02-05 PROCEDURE — 94760 N-INVAS EAR/PLS OXIMETRY 1: CPT

## 2023-02-05 PROCEDURE — 97166 OT EVAL MOD COMPLEX 45 MIN: CPT

## 2023-02-05 PROCEDURE — 97110 THERAPEUTIC EXERCISES: CPT

## 2023-02-05 PROCEDURE — 36415 COLL VENOUS BLD VENIPUNCTURE: CPT

## 2023-02-05 PROCEDURE — 99222 1ST HOSP IP/OBS MODERATE 55: CPT | Performed by: PHYSICAL MEDICINE & REHABILITATION

## 2023-02-05 PROCEDURE — 6370000000 HC RX 637 (ALT 250 FOR IP): Performed by: NURSE PRACTITIONER

## 2023-02-05 PROCEDURE — 82948 REAGENT STRIP/BLOOD GLUCOSE: CPT

## 2023-02-05 PROCEDURE — 6370000000 HC RX 637 (ALT 250 FOR IP): Performed by: INTERNAL MEDICINE

## 2023-02-05 PROCEDURE — 99223 1ST HOSP IP/OBS HIGH 75: CPT | Performed by: INTERNAL MEDICINE

## 2023-02-05 PROCEDURE — 2060000000 HC ICU INTERMEDIATE R&B

## 2023-02-05 PROCEDURE — 2700000000 HC OXYGEN THERAPY PER DAY

## 2023-02-05 PROCEDURE — 2580000003 HC RX 258: Performed by: NURSE PRACTITIONER

## 2023-02-05 RX ORDER — INSULIN GLARGINE 100 [IU]/ML
13 INJECTION, SOLUTION SUBCUTANEOUS
Status: DISCONTINUED | OUTPATIENT
Start: 2023-02-06 | End: 2023-02-10 | Stop reason: HOSPADM

## 2023-02-05 RX ORDER — INSULIN LISPRO 100 [IU]/ML
5 INJECTION, SOLUTION INTRAVENOUS; SUBCUTANEOUS
Status: DISCONTINUED | OUTPATIENT
Start: 2023-02-05 | End: 2023-02-10 | Stop reason: HOSPADM

## 2023-02-05 RX ORDER — INSULIN GLARGINE 100 [IU]/ML
28 INJECTION, SOLUTION SUBCUTANEOUS NIGHTLY
Status: DISCONTINUED | OUTPATIENT
Start: 2023-02-05 | End: 2023-02-10 | Stop reason: HOSPADM

## 2023-02-05 RX ORDER — ALPRAZOLAM 0.25 MG/1
0.25 TABLET ORAL ONCE
Status: DISCONTINUED | OUTPATIENT
Start: 2023-02-05 | End: 2023-02-05

## 2023-02-05 RX ORDER — POLYETHYLENE GLYCOL 3350 17 G/17G
17 POWDER, FOR SOLUTION ORAL 2 TIMES DAILY
Status: DISCONTINUED | OUTPATIENT
Start: 2023-02-05 | End: 2023-02-10 | Stop reason: HOSPADM

## 2023-02-05 RX ORDER — LIDOCAINE 4 G/G
2 PATCH TOPICAL DAILY
Status: DISCONTINUED | OUTPATIENT
Start: 2023-02-05 | End: 2023-02-10 | Stop reason: HOSPADM

## 2023-02-05 RX ORDER — INSULIN LISPRO 100 [IU]/ML
5 INJECTION, SOLUTION INTRAVENOUS; SUBCUTANEOUS
Status: DISCONTINUED | OUTPATIENT
Start: 2023-02-05 | End: 2023-02-05 | Stop reason: SDUPTHER

## 2023-02-05 RX ORDER — METOPROLOL SUCCINATE 25 MG/1
12.5 TABLET, EXTENDED RELEASE ORAL DAILY
Status: DISCONTINUED | OUTPATIENT
Start: 2023-02-06 | End: 2023-02-06

## 2023-02-05 RX ORDER — QUETIAPINE FUMARATE 25 MG/1
25 TABLET, FILM COATED ORAL 2 TIMES DAILY
Status: DISCONTINUED | OUTPATIENT
Start: 2023-02-05 | End: 2023-02-10 | Stop reason: HOSPADM

## 2023-02-05 RX ORDER — INSULIN GLARGINE 100 [IU]/ML
10 INJECTION, SOLUTION SUBCUTANEOUS
Status: DISCONTINUED | OUTPATIENT
Start: 2023-02-05 | End: 2023-02-05

## 2023-02-05 RX ORDER — CIPROFLOXACIN 500 MG/1
500 TABLET, FILM COATED ORAL EVERY 12 HOURS SCHEDULED
Status: DISCONTINUED | OUTPATIENT
Start: 2023-02-05 | End: 2023-02-08

## 2023-02-05 RX ADMIN — QUETIAPINE FUMARATE 25 MG: 25 TABLET ORAL at 17:46

## 2023-02-05 RX ADMIN — INSULIN LISPRO 4 UNITS: 100 INJECTION, SOLUTION INTRAVENOUS; SUBCUTANEOUS at 16:20

## 2023-02-05 RX ADMIN — INSULIN LISPRO 8 UNITS: 100 INJECTION, SOLUTION INTRAVENOUS; SUBCUTANEOUS at 08:50

## 2023-02-05 RX ADMIN — INSULIN LISPRO 3 UNITS: 100 INJECTION, SOLUTION INTRAVENOUS; SUBCUTANEOUS at 11:57

## 2023-02-05 RX ADMIN — METOPROLOL SUCCINATE 25 MG: 25 TABLET, EXTENDED RELEASE ORAL at 08:46

## 2023-02-05 RX ADMIN — ROSUVASTATIN 10 MG: 10 TABLET, FILM COATED ORAL at 08:45

## 2023-02-05 RX ADMIN — OXYCODONE HYDROCHLORIDE 2.5 MG: 5 TABLET ORAL at 16:48

## 2023-02-05 RX ADMIN — INSULIN LISPRO 5 UNITS: 100 INJECTION, SOLUTION INTRAVENOUS; SUBCUTANEOUS at 16:20

## 2023-02-05 RX ADMIN — SODIUM CHLORIDE, PRESERVATIVE FREE 10 ML: 5 INJECTION INTRAVENOUS at 08:45

## 2023-02-05 RX ADMIN — RIVAROXABAN 15 MG: 15 TABLET, FILM COATED ORAL at 16:22

## 2023-02-05 RX ADMIN — OXYCODONE HYDROCHLORIDE 2.5 MG: 5 TABLET ORAL at 08:46

## 2023-02-05 RX ADMIN — FUROSEMIDE 20 MG: 20 TABLET ORAL at 08:45

## 2023-02-05 RX ADMIN — SODIUM CHLORIDE, PRESERVATIVE FREE 10 ML: 5 INJECTION INTRAVENOUS at 20:45

## 2023-02-05 RX ADMIN — CEPHALEXIN 500 MG: 500 CAPSULE ORAL at 11:57

## 2023-02-05 RX ADMIN — POLYETHYLENE GLYCOL (3350) 17 G: 17 POWDER, FOR SOLUTION ORAL at 10:02

## 2023-02-05 RX ADMIN — ASPIRIN 81 MG 81 MG: 81 TABLET ORAL at 08:46

## 2023-02-05 RX ADMIN — CIPROFLOXACIN 500 MG: 500 TABLET, FILM COATED ORAL at 20:44

## 2023-02-05 RX ADMIN — RIVAROXABAN 15 MG: 15 TABLET, FILM COATED ORAL at 08:45

## 2023-02-05 RX ADMIN — CEPHALEXIN 500 MG: 500 CAPSULE ORAL at 05:42

## 2023-02-05 RX ADMIN — INSULIN GLARGINE 10 UNITS: 100 INJECTION, SOLUTION SUBCUTANEOUS at 10:01

## 2023-02-05 RX ADMIN — SENNOSIDES 8.6 MG: 8.6 TABLET, FILM COATED ORAL at 20:44

## 2023-02-05 RX ADMIN — INSULIN GLARGINE 28 UNITS: 100 INJECTION, SOLUTION SUBCUTANEOUS at 21:34

## 2023-02-05 RX ADMIN — POLYETHYLENE GLYCOL (3350) 17 G: 17 POWDER, FOR SOLUTION ORAL at 20:44

## 2023-02-05 RX ADMIN — INSULIN LISPRO 2 UNITS: 100 INJECTION, SOLUTION INTRAVENOUS; SUBCUTANEOUS at 11:57

## 2023-02-05 RX ADMIN — INSULIN LISPRO 3 UNITS: 100 INJECTION, SOLUTION INTRAVENOUS; SUBCUTANEOUS at 08:51

## 2023-02-05 RX ADMIN — OXYCODONE HYDROCHLORIDE 2.5 MG: 5 TABLET ORAL at 23:49

## 2023-02-05 ASSESSMENT — PAIN DESCRIPTION - DESCRIPTORS
DESCRIPTORS: DISCOMFORT
DESCRIPTORS: ACHING
DESCRIPTORS: ACHING
DESCRIPTORS: ACHING;DISCOMFORT

## 2023-02-05 ASSESSMENT — ENCOUNTER SYMPTOMS
EYE DISCHARGE: 0
COUGH: 0
RHINORRHEA: 0
WHEEZING: 0
NAUSEA: 0
BACK PAIN: 1
VOMITING: 0
CONSTIPATION: 0
DIARRHEA: 0
SHORTNESS OF BREATH: 0
SORE THROAT: 0
VOICE CHANGE: 1
EYE PAIN: 0
ABDOMINAL PAIN: 0
TROUBLE SWALLOWING: 0

## 2023-02-05 ASSESSMENT — PAIN DESCRIPTION - LOCATION
LOCATION: HIP

## 2023-02-05 ASSESSMENT — PAIN SCALES - GENERAL
PAINLEVEL_OUTOF10: 8
PAINLEVEL_OUTOF10: 5
PAINLEVEL_OUTOF10: 5
PAINLEVEL_OUTOF10: 7
PAINLEVEL_OUTOF10: 4
PAINLEVEL_OUTOF10: 8

## 2023-02-05 ASSESSMENT — PAIN DESCRIPTION - ORIENTATION
ORIENTATION: RIGHT
ORIENTATION: RIGHT
ORIENTATION: LEFT
ORIENTATION: RIGHT

## 2023-02-05 ASSESSMENT — PAIN DESCRIPTION - FREQUENCY
FREQUENCY: INTERMITTENT
FREQUENCY: CONTINUOUS
FREQUENCY: CONTINUOUS
FREQUENCY: INTERMITTENT

## 2023-02-05 ASSESSMENT — PAIN DESCRIPTION - ONSET
ONSET: ON-GOING

## 2023-02-05 ASSESSMENT — PAIN DESCRIPTION - PAIN TYPE
TYPE: ACUTE PAIN
TYPE: CHRONIC PAIN

## 2023-02-05 NOTE — PROGRESS NOTES
Patient sat on edge of bed for approximately 5 minutes then stood at edge of bed for approximately 30 seconds. Required 2 assist and gait belt. When standing, patient states being unable to straighten out.   Once back in bed patient, repositioned on left side with two assist.

## 2023-02-05 NOTE — PROGRESS NOTES
Orthopaedic Progress Note      SUBJECTIVE   Ms. Francheska Mathur is post op day # 2     Patient notes status post right intramedullary nailing femur secondary to intertrochanteric femur fracture. OBJECTIVE      Physical    VITALS:  BP (!) 156/63   Pulse 94   Temp 98.7 °F (37.1 °C) (Oral)   Resp 18   Ht 5' 2\" (1.575 m)   Wt 225 lb 11.2 oz (102.4 kg)   LMP  (LMP Unknown)   SpO2 100%   BMI 41.28 kg/m²   I/O last 3 completed shifts: In: 1301.1 [P.O.:1114; IV Piggyback:187.1]  Out: 975 [Urine:975]      Patient is doing well at this time no new complaints no new issues. She is able to flex extend toes. Denies tenderness palpation at the calf at this time. Does have some thigh swelling secondary to surgical intervention of fracture.       Data  CBC:   Lab Results   Component Value Date/Time    WBC 10.1 02/04/2023 05:09 AM    HGB 7.3 02/04/2023 05:09 AM     02/04/2023 05:09 AM     BMP:    Lab Results   Component Value Date/Time     02/05/2023 04:39 AM    K 4.9 02/05/2023 04:39 AM    K 4.1 01/28/2023 05:20 AM    CL 97 02/05/2023 04:39 AM    CO2 27 02/05/2023 04:39 AM    BUN 26 02/05/2023 04:39 AM    CREATININE 0.8 02/05/2023 04:39 AM    CALCIUM 8.2 02/05/2023 04:39 AM    GLUCOSE 292 02/05/2023 04:39 AM     Uric Acid:  No components found for: URIC  PT/INR:    Lab Results   Component Value Date/Time    INR 1.72 02/04/2023 05:09 AM     Troponin:  No results found for: TROPONINI  Urine Culture:  No components found for: CURINE      Current Inpatient Medications    Current Facility-Administered Medications: polyethylene glycol (GLYCOLAX) packet 17 g, 17 g, Oral, BID  insulin glargine (LANTUS) injection vial 10 Units, 10 Units, SubCUTAneous, Daily with breakfast  lidocaine 4 % external patch 2 patch, 2 patch, TransDERmal, Daily  glucose chewable tablet 16 g, 4 tablet, Oral, PRN  dextrose bolus 10% 125 mL, 125 mL, IntraVENous, PRN **OR** dextrose bolus 10% 250 mL, 250 mL, IntraVENous, PRN  glucagon (rDNA) injection 1 mg, 1 mg, SubCUTAneous, PRN  dextrose 10 % infusion, , IntraVENous, Continuous PRN  insulin lispro (HUMALOG) injection vial 3 Units, 3 Units, SubCUTAneous, TID   insulin glargine (LANTUS) injection vial 25 Units, 25 Units, SubCUTAneous, Nightly  0.9 % sodium chloride infusion, , IntraVENous, PRN  aspirin chewable tablet 81 mg, 81 mg, Oral, Daily  rivaroxaban (XARELTO) tablet 15 mg, 15 mg, Oral, BID WC  albuterol sulfate HFA (PROVENTIL;VENTOLIN;PROAIR) 108 (90 Base) MCG/ACT inhaler 2 puff, 2 puff, Inhalation, Q6H PRN  Menthol lozenge 4.8 mg, 1 lozenge, Oral, Q3H PRN  guaiFENesin (ROBITUSSIN) 100 MG/5ML liquid 400 mg, 400 mg, Oral, BID PRN  metoprolol succinate (TOPROL XL) extended release tablet 25 mg, 25 mg, Oral, Daily  rosuvastatin (CRESTOR) tablet 10 mg, 10 mg, Oral, Daily  senna (SENOKOT) tablet 8.6 mg, 1 tablet, Oral, Nightly  tiZANidine (ZANAFLEX) tablet 4 mg, 4 mg, Oral, Q8H PRN  sodium chloride flush 0.9 % injection 5-40 mL, 5-40 mL, IntraVENous, 2 times per day  sodium chloride flush 0.9 % injection 5-40 mL, 5-40 mL, IntraVENous, PRN  0.9 % sodium chloride infusion, , IntraVENous, PRN  acetaminophen (TYLENOL) tablet 650 mg, 650 mg, Oral, Q6H PRN **OR** acetaminophen (TYLENOL) suppository 650 mg, 650 mg, Rectal, Q6H PRN  potassium chloride (KLOR-CON M) extended release tablet 40 mEq, 40 mEq, Oral, PRN **OR** potassium bicarb-citric acid (EFFER-K) effervescent tablet 40 mEq, 40 mEq, Oral, PRN **OR** potassium chloride 10 mEq/100 mL IVPB (Peripheral Line), 10 mEq, IntraVENous, PRN  magnesium sulfate 2000 mg in 50 mL IVPB premix, 2,000 mg, IntraVENous, PRN  insulin lispro (HUMALOG) injection vial 0-8 Units, 0-8 Units, SubCUTAneous, TID   insulin lispro (HUMALOG) injection vial 0-4 Units, 0-4 Units, SubCUTAneous, Nightly  morphine (PF) injection 1 mg, 1 mg, IntraVENous, Q6H PRN  cephALEXin (KEFLEX) capsule 500 mg, 500 mg, Oral, 4 times per day  oxyCODONE (ROXICODONE) immediate release tablet 2.5 mg, 2.5 mg, Oral, Q4H PRN **OR** [DISCONTINUED] oxyCODONE (ROXICODONE) immediate release tablet 5 mg, 5 mg, Oral, Q4H PRN  hydrOXYzine (VISTARIL) injection 25 mg, 25 mg, IntraMUSCular, Q6H PRN  furosemide (LASIX) tablet 20 mg, 20 mg, Oral, Daily        PLAN    Continue working physical therapy weightbearing as tolerated. With patient follow-up with us in approximately 6 weeks. Orthopedics will sign off at this time.   There is any issues in-house we will see her

## 2023-02-05 NOTE — PROGRESS NOTES
[facility] ICU STEPDOWN TELEMETRY 4K  EVALUATION    Time:    Time In: 5277  Time Out: 1421  Timed Code Treatment Minutes: 14 Minutes  Minutes: 29          Date: 2023  Patient Name: Allie Segura,   Gender: female      MRN: [id number]  : 1950  (81 y.o.)  Referring Practitioner: WYATT Solis CNP  Diagnosis: acute dystolic heart failure  Additional Pertinent Hx: Per ER note on 2023:72 y.o. female who presents for increasing leg swelling. Patient was just discharged from the hospital on 2023. She was discovered to have bilateral DVTs and PEs. She is supposed to be on rivaroxaban. She states she is taking her medication. She called the home health nurse tonight who instructed her to come in for evaluation and treatment. Swelling has been ongoing for quite some time. Pt sustained a mechanical fall while in the ED pending admission for BLE swelling, n/v. Immediate right sided hip pain, imaging revealed right femur fracture,   s/p Open treatment right intertrochanteric femur fracture the cephalomedullary nail on 2/3/2023. Restrictions/Precautions:  Restrictions/Precautions: General Precautions, Fall Risk, Weight Bearing  Right Lower Extremity Weight Bearing: Weight Bearing As Tolerated  Position Activity Restriction  Other position/activity restrictions: tracheostomy/collar with moist air, 1-2 L at baseline    Subjective  Chart Reviewed: Yes, Orders, Progress Notes, History and Physical  Patient assessed for rehabilitation services?: Yes  Family / Caregiver Present: Yes ()    Subjective: Pt drowsy in bed upon arrival of therapist. Pt required encouragement to participate. Noted high levels of anxiety, confusion, & poor insight (Pt reporting at one point she needed have a BM-therapist reported bedpan or BSC her options but needed to stand & turn in order to get on-and Pt had difficult time standing earlier).  Pt thought she could get on [facility] until after trying to stand. Pain: no number given/10: reports significant pain in RLE    Vitals: Vitals not assessed per clinical judgement, see nursing flowsheet    Social/Functional History:  Lives With: Spouse  Type of Home: House  Home Layout: One level  Home Access: Ramped entrance  Home Equipment: emil Hedrick (BSC)   Bathroom Shower/Tub: Walk-in shower  Bathroom Toilet: Standard  Bathroom Equipment: Built-in shower seat       ADL Assistance: Needs assistance  Homemaking Assistance: Needs assistance  Ambulation Assistance: Independent  Transfer Assistance: Independent    Active : No  Patient's  Info: Spouse provides transportation services     Additional Comments: pt amb short distances in the home with RW,  able to assist as needed. VISION:WFL    HEARING:  WFL    COGNITION: Slow Processing, Decreased Insight, Decreased Problem Solving, and Decreased Safety Awareness    RANGE OF MOTION:  Bilateral Upper Extremity:  WFL    STRENGTH:  Bilateral Upper Extremity:  4-/5 grossly    SENSATION:   BUE WFL;  reports Pt has neuropathy in BLE    ADL:   Footwear Management: Dependent. For don/doffing slipper socks . BALANCE:  Sitting Balance:  Contact Guard Assistance. Vcs for posture, leaning to L side to minimize WB on R hip  Standing Balance: Moderate Assistance. X  2; only able to achievel 1/2 stand    BED MOBILITY:  Rolling to Left: Maximum Assistance, X 1    Rolling to Right: Maximum Assistance, X 1    Supine to Sit: Maximum Assistance, X 1    Sit to Supine: Maximum Assistance, X 2    Later scoots toward [facility] with max A & max vcs for technique to A  **Pt was quick to ask  to A; Pt having  pull on her UE-therapist cautioned against this-for prevention of shoulder injuries      TRANSFERS:  Sit to Stand:   Moderate Assistance, X 2.    Stand to Sit: Moderate Assistance, X 2.    **max vcs for posture, Pt fearful of fall & c/o significant pain in RLE  **Pt tried standing 2x    FUNCTIONAL MOBILITY:  Not ready to attempt yet. Activity Tolerance:  Patient tolerance of  treatment: fair. Assessment:  Assessment: Pt demo decreased ADL & functional mobility over PLOF s/p admission with acute heart failure, s/p fall with femur IM nailing. Continued OT recommended to faciliate improvements in these areas to increase indep & safety for eventual returning home with family. Performance deficits / Impairments: Decreased functional mobility , Decreased strength, Decreased endurance, Decreased ADL status, Decreased safe awareness, Decreased balance, Decreased cognition  Prognosis: Fair  REQUIRES OT FOLLOW-UP: Yes  Decision Making: High Complexity    Treatment Initiated: Treatment and education initiated within context of evaluation. Evaluation time included review of current medical information, gathering information related to past medical, social and functional history, completion of standardized testing, formal and informal observation of tasks, assessment of data and development of plan of care and goals. Treatment time included skilled education and facilitation of tasks to increase safety and independence with ADL's for improved functional independence and quality of life. Discharge Recommendations:  Continue to assess pending progress    Patient Education:     Patient Education  Education Given To: Patient  Education Provided: Role of Therapy, Plan of Care, ADL Adaptive Strategies, Transfer Training  Education Method: Demonstration, Verbal  Barriers to Learning: Cognition  Education Outcome: Continued education needed    Equipment Recommendations: Other: Monitor pending progress    Plan:  Times Per Week: 5x  Current Treatment Recommendations: Balance training, Functional mobility training, Endurance training, Self-Care / ADL, Safety education & training, Patient/Caregiver education & training, Strengthening.   See long-term goal time frame for expected duration of plan of care. If no long-term goals established, a short length of stay is anticipated. Goals:  Patient goals : Pt did not state  Short Term Goals  Time Frame for Short Term Goals: until discharge  Short Term Goal 1: Pt will tolerate 8-10 min EOB ADL task with S for balance  Short Term Goal 2: Pt will complete sit-stand t/fs with mod A x 1 for eventual BSC t/fs  Short Term Goal 3: Pt will tolerate standing 1 min with min A for increased ease of toileting routine  Short Term Goal 4: Pt will tolerate further assessment of functional t/fs by OTR when appropriate  Long Term Goals  Time Frame for Long Term Goals : No LTG set d/t short ELOS         Following session, patient left in safe position with all fall risk precautions in place.

## 2023-02-05 NOTE — CONSULTS
Physical Medicine & Rehabilitation Consultation Note      Admitting Physician: Ember Kennedy MD    Primary Care Provider: Darius Napier MD     Reason for Consult: Posterior right femur fracture repair, decreased ADLs, IPR versus nursing home    History of Present Illness:  Fly Bustillo is a 81 y.o. 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 The patient previously was admitted to inpatient rehab service from 2022 to 2022 due to critical care myopathy and debility/physical decondition as result of COVID-19 pneumonia. She was discharged to home with referral for home care service on 2022. The patient developed progressive difficulty breathing in 2022 and was found to have glottic stenosis and eventually received tracheostomy by Dr. Ashley Ridley on 2022. The patient's  says the patient also developed progressively worsening lower back pain in summer 2022 and saw orthopedics surgeon at [facility]. The patient has been says the patient was diagnosed of having \"lumbar spine stress fracture\". No surgical intervention was performed. She was treated with brace which she later abandoned due to discomfort associate with brace usage.     The patient was recently hospitalized from 1/21/2023 to 1/29/2023 initially for abdominal bloating, nausea and leg swelling. She was found to have elevated D-dimer. Subsequent testing revealed right lower lobe pulmonary embolism and right lower extremity DVT. She was at initially treated with IV heparin and later transition to [address] [place] [address]. The patient was brought to ER on 2/1/2022 because of progressively increased leg swelling, and nausea/vomiting associated with decreased oral intake. She was found to have BNP of 256.7 and Bumex was prescribed. She then had a fall accident  when she was going to toilet while she was in ER on 2/1/2022. The fall resulted severe right hip pain. X-ray of right hip and pelvis revealed acute right femoral intertrochanteric fracture with subtrochanteric extension. Orthopedic service was consulted. Xarelto was held in preparation for surgical management. Cardiology was consulted on 2/2/2023 for elevated BNP and Lasix was started. Because of difficulty voiding with urinary retention, urology was also consulted on 2/2/2023. Urology service placed Wen with some difficulty on 2/2/2023. Therefore Wen was kept in place. On 2/3/2023 the patient underwent open treatment of right intertrochanteric fracture with cephalomedullary nail by Dr. Skyler Jha. She is allowed weightbearing as tolerated at the right lower extremity postoperatively. At the present time the patient complains of constant aching pain with intermittent sharp pain at her right groin and hip area. Her right lateral thigh also is somewhat painful. She rates the pain intensity at 7/10 level. Her right lower extremity feels weak due to the pain. She says her bilateral feet feel numb due to history of diabetic neuropathy. She also continues having pain at her low back area. She denies having headache, dizziness, lightheadedness, shortness of breath, chest pain, nausea or vomiting, abdominal pain, diarrhea.   She is still on Wen catheter. She says she only slept 3 hours yesterday. Most Recent Rehabilitation Assessments:  PT:    (2/4/2023) : Timed Code Treatment Minutes: 18 Minutes  Activity Tolerance:  Patient tolerance of  treatment: fair. Decreased tolerance due to increased pain, overall weakness and decreased endurance with quick fatigue. Balance:  Static Sitting Balance:  Contact Guard Assistance, Moderate Assistance, X 1, with cues for safety, with verbal cues   Initially required mod A, once more stable required CGA for safety. Pt tolerated about 7 min on EOB. Bed Mobility:  Rolling to Right: Moderate Assistance, X 1, with rail, with verbal cues    Supine to Sit: Moderate Assistance, X 2, with head of bed raised, with rail, with verbal cues , with increased time for completion  Sit to Supine: Moderate Assistance, X 2   Scooting: Dependent with hercules sheet, required max A to scoot towards EOB for foot placement  Assist for  B LE to place on EOB  and back into bed for support, cues for technique with fair demo. Pt completed rolling to place pillow at end of session for comfort and support. Transfers:  Not Tested  Declined at this time due to increased pain     Ambulation:  Not Tested       OT:    (2/5/2023) : Timed Code Treatment Minutes: 14 Minutes  Activity Tolerance:  Patient tolerance of  treatment: fair. ADL:   Footwear Management: Dependent. For don/doffing slipper socks . BALANCE:  Sitting Balance:  Contact Guard Assistance. Vcs for posture, leaning to L side to minimize WB on R hip  Standing Balance: Moderate Assistance.  X  2; only able to achieve 1/2 stand     BED MOBILITY:  Rolling to Left: Maximum Assistance, X 1    Rolling to Right: Maximum Assistance, X 1    Supine to Sit: Maximum Assistance, X 1    Sit to Supine: Maximum Assistance, X 2    Later scoots toward [facility] with max A & max vcs for technique to A  **Pt was quick to ask  to A; Pt having  pull on her UE-therapist cautioned against this-for prevention of shoulder injuries    TRANSFERS:  Sit to Stand: Moderate Assistance, X 2.    Stand to Sit: Moderate Assistance, X 2.    **max vcs for posture, Pt fearful of fall & c/o significant pain in RLE  **Pt tried standing 2x     FUNCTIONAL MOBILITY:  Not ready to attempt yet.         ST: Not requested      Past Medical History:        Diagnosis Date    Arthritis     Coronary artery disease     COVID     Hyperlipidemia     Primary hypertension     Type 2 diabetes mellitus ([address] 2018       Past Surgical History:        Procedure Laterality Date    CARDIAC SURGERY      CATARACT REMOVAL Bilateral      SECTION      3 times    CORONARY ANGIOPLASTY WITH STENT PLACEMENT  2008    stenting twice    EYE SURGERY      HIATAL HERNIA REPAIR      late     HYSTERECTOMY, TOTAL ABDOMINAL (CERVIX REMOVED)      in     LARYNGOSCOPY N/A 3/22/2022    SUSPENSON LARYNGOSCOPY WITH JET VENT, DILATION performed by Alla Leroy MD at [address] N/A 3/28/2022    SUSPENSION MICROLARYNGOSCOPY WITH BALLOON DILATION AND JET VENT, KENALOG INJECTION performed by Alla Leroy MD at [address] N/A 2022    Suspension Laryngoscopy  Lateral of Right True Vocal Cord, With Steroid Injection of Larynx And Tracheostomy Tube Change, debridement performed by Vance Barnett MD at [address] N/A 8/10/2022    TRANSORAL LARYNGOPLASTY WITH LEFT PARTIAL ARYTENODECTOMY AND POSS LATERALIZATION, ADVANCEMENT FLAP RECONSTRUCTION WITH JET VENT,THERAPUTIC BRONCHOSCOPY WITH DEBRIEDMENT,WITH BALLOON DILITATION performed by Vance Barnett MD at [address] N/A 2022    Laryngoscopy with Dilation Debridement  Bronchoscopy with Dilation & Resection of Obstructing Tissues Transoral Laryngoplasty Left True Vocal Fold Lateralization left partial aryteniodectomy performed by Vance Barnett MD at [address]      in [address] [place] [address] N/A 4/1/2022    TRACHEOTOMY TUBE REPLACEMENT performed by Katina Chou MD at [address]:     Allergies   Allergen Reactions    Metformin And Related Other (See Comments)     diarrhea    Codeine Nausea And Vomiting    Meperidine Hcl Nausea And Vomiting    Sulfa Antibiotics Nausea And Vomiting    Sumatriptan Nausea And Vomiting        Current Medications:   Current Facility-Administered Medications   Medication Dose Route Frequency Provider Last Rate Last Admin    polyethylene glycol (GLYCOLAX) packet 17 g  17 g Oral BID Armaan Manriquez MD   17 g at 02/05/23 1002    lidocaine 4 % external patch 2 patch  2 patch TransDERmal Daily Armaan Manriquez MD   2 patch at 02/05/23 1001    ciprofloxacin (CIPRO) tablet 500 mg  500 mg Oral 2 times per day Armaan Manriquez MD        insulin lispro (HUMALOG) injection vial 5 Units  5 Units SubCUTAneous TID WC Armaan Manriquez MD        insulin glargine (LANTUS) injection vial 28 Units  28 Units SubCUTAneous Nightly Armaan Manriquez MD        [START ON 2/6/2023] insulin glargine (LANTUS) injection vial 13 Units  13 Units SubCUTAneous Daily with breakfast Armaan Manriquez MD        QUEtiapine (SEROQUEL) tablet 25 mg  25 mg Oral BID Armaan Manriquez MD        glucose chewable tablet 16 g  4 tablet Oral PRN Radha Garcia MD        dextrose bolus 10% 125 mL  125 mL IntraVENous PRN Yumi Honeycutt MD        Or    dextrose bolus 10% 250 mL  250 mL IntraVENous PRN Yumi Honeycutt MD        glucagon (rDNA) injection 1 mg  1 mg SubCUTAneous PRN Radha Garcia MD        dextrose 10 % infusion   IntraVENous Continuous PRN Radha Garcia MD        0.9 % sodium chloride infusion   IntraVENous PRN WYATT Macias CNP        aspirin chewable tablet 81 mg  81 mg Oral Daily WYATT Macias CNP   81 mg at 02/05/23 0846    rivaroxaban (XARELTO) tablet 15 mg  15 mg Oral BID  WYATT Macias CNP   15 mg at 02/05/23 0845    albuterol sulfate HFA (PROVENTIL;VENTOLIN;PROAIR) 108 (90 Base) MCG/ACT inhaler 2 puff  2 puff Inhalation Q6H PRN WYATT Macias CNP        Menthol lozenge 4.8 mg  1 lozenge Oral Q3H PRN WYATT Macias CNP        guaiFENesin (ROBITUSSIN) 100 MG/5ML liquid 400 mg  400 mg Oral BID PRN Claudetta Primus Heitmeyer, APRN - CNP   400 mg at 02/04/23 2352    metoprolol succinate (TOPROL XL) extended release tablet 25 mg  25 mg Oral Daily WYATT Macias CNP   25 mg at 02/05/23 0846    rosuvastatin (CRESTOR) tablet 10 mg  10 mg Oral Daily WYATT Macias CNP   10 mg at 02/05/23 0845    senna (SENOKOT) tablet 8.6 mg  1 tablet Oral Nightly WYATT Macias CNP   8.6 mg at 02/04/23 2001    sodium chloride flush 0.9 % injection 5-40 mL  5-40 mL IntraVENous 2 times per day Claudetta Primus Heitmeyer, APRN - CNP   10 mL at 02/05/23 0845    sodium chloride flush 0.9 % injection 5-40 mL  5-40 mL IntraVENous PRN WYATT Macias CNP        0.9 % sodium chloride infusion   IntraVENous PRN WYATT Macias CNP        acetaminophen (TYLENOL) tablet 650 mg  650 mg Oral Q6H PRN WYATT Macias CNP        Or    acetaminophen (TYLENOL) suppository 650 mg  650 mg Rectal Q6H PRN WYATT Macias CNP        potassium chloride (KLOR-CON M) extended release tablet 40 mEq  40 mEq Oral PRN WYATT Macias CNP        Or    potassium bicarb-citric acid (EFFER-K) effervescent tablet 40 mEq  40 mEq Oral PRN WYATT Macias CNP        Or    potassium chloride 10 mEq/100 mL IVPB (Peripheral Line)  10 mEq IntraVENous PRN WYATT Macias CNP        magnesium sulfate 2000 mg in 50 mL IVPB premix  2,000 mg IntraVENous PRN Ashley Marshall, APRN - CNP        insulin lispro (HUMALOG) injection vial 0-8 Units  0-8 Units SubCUTAneous TID  Armaan Manriquez MD   2 Units at 02/05/23 1157    insulin lispro (HUMALOG) injection vial 0-4 Units 0-4 Units SubCUTAneous Nightly WYATT Macias CNP        cephALEXin (KEFLEX) capsule 500 mg  500 mg Oral 4 times per day WYATT Fernandes CNP   500 mg at 02/05/23 1157    oxyCODONE (ROXICODONE) immediate release tablet 2.5 mg  2.5 mg Oral Q4H PRN WYATT Fernandes CNP   2.5 mg at 02/05/23 0846    hydrOXYzine (VISTARIL) injection 25 mg  25 mg IntraMUSCular Q6H PRN WYATT Macias CNP   25 mg at 02/02/23 2045    furosemide (LASIX) tablet 20 mg  20 mg Oral Daily WYATT Macias CNP   20 mg at 02/05/23 0845        Social History:  Social History     Socioeconomic History    Marital status:      Spouse name: bailee     Number of children: 3    Years of education: Not on file    Highest education level: Not on file   Occupational History    Not on file   Tobacco Use    Smoking status: Never    Smokeless tobacco: Never   Vaping Use    Vaping Use: Never used    Passive vaping exposure: Yes   Substance and Sexual Activity    Alcohol use: Not Currently     Comment: drinks 1 glass of wine cooler or wine about 3 times per year    Drug use: Never    Sexual activity: Not Currently   Other Topics Concern    Not on file   Social History Narrative    Not on file     Social Determinants of Health     Financial Resource Strain: Not on file   Food Insecurity: Not on file   Transportation Needs: Not on file   Physical Activity: Not on file   Stress: Not on file   Social Connections: Not on file   Intimate Partner Violence: Not on file   Housing Stability: Not on file     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; 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  Lives with: Her ; her daughter Darwin Donaldson and her son live in [address] [place] [address] setup: 1 level plus basement house with one 8 inch step outside front door without handrail  Prior functional status:  The patient is independence in feeding and grooming ; she needs assistant for getting in and out of the bed, upper body dressing since August 2022 due to severe low back pain; she needs assistance for donning and doffing shoes and socks, bathing since summer 2022 due to worsening low back pain; she need assistant for toileting for the past 2 to 3 months; she started using front wheel rolling walker to assist walking in August 2022 at home; she had not drove for several years. Family History:       Problem Relation Age of Onset    Parkinson's Disease Father     Lung Cancer Father        Review of Systems:  Review of Systems   Constitutional:  Negative for chills, diaphoresis, fatigue and fever. HENT:  Positive for voice change. Negative for ear discharge, ear pain, hearing loss, rhinorrhea, sneezing, sore throat, tinnitus and trouble swallowing. Eyes:  Negative for pain, discharge and visual disturbance. Respiratory:  Negative for cough, shortness of breath and wheezing. Cardiovascular:  Positive for leg swelling (Bilateral legs). Negative for chest pain. Gastrointestinal:  Negative for abdominal pain, constipation, diarrhea, nausea and vomiting. Endocrine: Negative for cold intolerance and heat intolerance. Genitourinary:  Positive for difficulty urinating. Negative for dysuria. Musculoskeletal:  Positive for arthralgias (Right hip and groin), back pain (Lower back), gait problem and myalgias (Right thigh). Negative for neck pain. Skin:  Positive for wound (Surgical wounds at the right thigh). Negative for rash. Allergic/Immunologic: Negative for food allergies. Neurological:  Positive for speech difficulty, weakness (Right lower extremity) and numbness (Bilateral feet). Negative for dizziness, tremors, facial asymmetry, light-headedness and headaches. Hematological:  Does not bruise/bleed easily. Psychiatric/Behavioral:  Positive for sleep disturbance. Negative for dysphoric mood and hallucinations. The patient is not nervous/anxious.         Physical Exam:  /60   Pulse 88   Temp 99 °F (37.2 °C) (Oral) Resp 18   Ht 5' 2\" (1.575 m)   Wt 225 lb 11.2 oz (102.4 kg)   LMP  (LMP Unknown)   SpO2 99%   BMI 41.28 kg/m²   Physical Exam  General:  well-developed, well nourished morbid obese  female ; in no acute distress ; appropriate affect & mood; lying on bed comfortably; receiving oxygen supplement via trach mask  Eyes: pupil equally round ; extra-ocular motion intact bilaterally  Head, Ear, Nose, Mouth & Throat : normocephalic ; no tenderness at face or head scalp; no discharge from ears or nose ; no deformity ; no facial swelling ; oral mucosa pink; speaking in a whisper manner due to presence of tracheostomy; able to produce voice with good volume when tracheostomy opening was capped  Neck :  supple ; presence of tracheostomy at anterior neck; no tenderness or muscle spasm at cervical paraspinal muscle and upper trapezius muscle  Cardiovascular : regular rate & rhythm ; normal S1 & S2 heart sound ; no murmur ; normal peripheral pulse at bilateral upper & lower extremities  Pulmonary : Breath sounds present at bilateral lung fields; no wheezing ; no rale; no crackle  Gastrointestinal : soft, protruded abdomen without tenderness ; normal bowel sound present    : Wen catheter in place draining light yellowish urine  Back : Not assessed due to inability to turn to the side secondary to pain  Skin: no skin lesion or rash ; presence of adhesive dressing at right lateral upper and lower thigh; no pitting edema at bilateral upper extremities; 2+ pitting edema at bilateral legs and feet more on the right side than the left side; right leg slightly more swelling than the left; presence of PICC line on right arm near elbow area  Musculoskeletal : no limb asymmetry; no limb deformity; tenderness at right lateral thigh and right groin area; no tenderness at bilateral upper extremities & the rest of bilateral lower extremities; no palpable mass at limbs ; right hip and right knee joint laxity not assessed; no joints laxity or crepitation at other limb joints; shoulder flexion and abduction passive ROM reaching 160 degrees bilaterally; right hip flexion passive ROM reaching 30 degrees and right knee flexion passive ROM reaching 30 degrees limited by pain at right hip and thigh; left hip flexion passive ROM reaching 80 degrees; ankle dorsiflexion passive ROM reaching 0 degrees bilaterally; otherwise normal functional joints ROM at the rest of bilateral upper & lower extremities  Cerebral :  alert ; awake ; oriented to place, person and time; follow two-step verbal command; abstract thinking intact  Cerebellum : no dysmetria with bilateral finger-to-nose test but with a slight tremor; unable to perform heel-to-shin test on the right side; dysmetria with left heel-to-shin test  Sensory : intact light touch and pin prick sensation at bilateral upper extremities; reduced light touch and pinprick sensation at distal bilateral lower extremities from upper leg region downward in sock distribution  Motor : normal tone at bilateral upper & left lower extremities ; muscle tone not assessed on right lower extremity due to the pain; 2-/5 muscle strength at right hip flexion, abduction and adduction; 2+/5 to 3-/5 muscle strength at right knee extension and flexion; 3-/5 muscle strength at the left hip flexion; 4-/5 muscle strength at the left knee flexion and extension; otherwise 5/5 muscle strength at the rest of bilateral upper and the lower extremities  Reflex : 2+ bilateral triceps reflexes; 1+ bilateral brachioradialis reflexes; 0 bilateral biceps, bilateral knees and bilateral ankles reflexes   Pathological Reflex :  No Roberta's sign ; no ankle clonus; no Babinski sign  Gait : Not assessed      Diagnostics:  Recent Results (from the past 24 hour(s))   POCT glucose    Collection Time: 02/04/23  4:27 PM   Result Value Ref Range    POC Glucose 283 (H) 70 - 108 mg/dl   POCT glucose    Collection Time: 02/04/23  7:44 PM   Result Value Ref Range    POC Glucose 284 (H) 70 - 108 mg/dl   Basic Metabolic Panel    Collection Time: 02/05/23  4:39 AM   Result Value Ref Range    Sodium 131 (L) 135 - 145 meq/L    Potassium 4.9 3.5 - 5.2 meq/L    Chloride 97 (L) 98 - 111 meq/L    CO2 27 23 - 33 meq/L    Glucose 292 (H) 70 - 108 mg/dL    BUN 26 (H) 7 - 22 mg/dL    Creatinine 0.8 0.4 - 1.2 mg/dL    Calcium 8.2 (L) 8.5 - 10.5 mg/dL   Magnesium    Collection Time: 02/05/23  4:39 AM   Result Value Ref Range    Magnesium 1.9 1.6 - 2.4 mg/dL   Anion Gap    Collection Time: 02/05/23  4:39 AM   Result Value Ref Range    Anion Gap 7.0 (L) 8.0 - 16.0 meq/L   Glomerular Filtration Rate, Estimated    Collection Time: 02/05/23  4:39 AM   Result Value Ref Range    Est, Glom Filt Rate >60 >60 ml/min/1.73m2   POCT glucose    Collection Time: 02/05/23  8:12 AM   Result Value Ref Range    POC Glucose 349 (H) 70 - 108 mg/dl   POCT glucose    Collection Time: 02/05/23 11:43 AM   Result Value Ref Range    POC Glucose 241 (H) 70 - 108 mg/dl        [facility] Reference Range & Units 2/3/23 04:18 2/4/23 05:09 2/5/23 04:39   Sodium 135 - 145 meq/L 140 132 (L) 131 (L)   Potassium 3.5 - 5.2 meq/L 4.5 5.3 (H) 4.9   Chloride 98 - 111 meq/L 103 98 97 (L)   CO2 23 - 33 meq/L 27 24 27   BUN,BUNPL 7 - 22 mg/dL 7 16 26 (H)   Creatinine 0.4 - 1.2 mg/dL 0.7 1.0 0.8   Anion Gap 8.0 - 16.0 meq/L 10.0 10.0 7.0 (L)   Est, Glom Filt Rate >60 ml/min/1.73m2 >60 >60 >60   Magnesium 1.6 - 2.4 mg/dL 1.8 1.9 1.9   Glucose, Random 70 - 108 mg/dL 102 270 (H) 292 (H)   CALCIUM, SERUM, 264289 8.5 - 10.5 mg/dL 8.2 (L) 8.0 (L) 8.2 (L)   (L): Data is abnormally low  (H): Data is abnormally high       Latest Reference Range & Units 1/21/23 08:08 1/21/23 17:00 2/1/23 22:50 2/2/23 08:20   Pro-BNP 0.0 - 124.0 pg/mL   256.7 (H)    Troponin T ng/ml 0.019 ! 0.022 ! 0.027 ! 0.025 !   (H): Data is abnormally high  !: Data is abnormal       Latest Reference Range & Units 8/22/22 13:30 1/21/23 04:15 2/1/23 22:50 Albumin 3.5 - 5.1 g/dL 4.7 4.3 3.1 (L)   Alk Phos 38 - 126 U/L 87 84 95   ALT 11 - 66 U/L 7 (L) 53 12   AST 5 - 40 U/L 12 34 14   Bilirubin 0.3 - 1.2 mg/dL 0.2 (L) 0.4 0.4   Bilirubin, Direct 0.0 - 0.3 mg/dL   <0.2   Lipase 5.6 - 51.3 U/L  30.4 15.4   Total Protein 6.1 - 8.0 g/dL 6.8 6.8 5.2 (L)   (L): Data is abnormally low       Latest Reference Range & Units 2/4/23 16:27 2/4/23 19:44 2/5/23 08:12 2/5/23 11:43   POC Glucose 70 - 108 mg/dl 283 (H) 284 (H) 349 (H) 241 (H)   (H): Data is abnormally high     Latest Reference Range & Units 11/17/22 13:20 1/24/23 05:40   Hemoglobin A1C 4.4 - 6.4 % 8.8 (H) 5.5   (H): Data is abnormally high       Latest Reference Range & Units 2/1/23 22:50 2/3/23 04:18 2/4/23 05:09   WBC 4.8 - 10.8 thou/mm3 6.4 7.6 10.1   RBC 4.20 - 5.40 mill/mm3 2.75 (L) 2.85 (L) 2.37 (L)   Hemoglobin Quant 12.0 - 16.0 gm/dl 8.8 (L) 9.0 (L) 7.3 (L)   Hematocrit 37.0 - 47.0 % 26.7 (L) 29.1 (L) 24.0 (L)   MCV 81.0 - 99.0 fL 97.1 102.1 (H) 101.3 (H)   MCH 26.0 - 33.0 pg 32.0 31.6 30.8   MCHC 32.2 - 35.5 gm/dl 33.0 30.9 (L) 30.4 (L)   MPV 9.4 - 12.4 fL 9.8 9.2 (L) 9.6   RDW-CV 11.5 - 14.5 % 15.3 (H) 15.7 (H) 15.4 (H)   RDW-SD 35.0 - 45.0 fL 53.0 (H) 58.0 (H) 56.5 (H)   Platelet Count 686 - 400 thou/mm3 232 289 307   Lymphocytes Absolute 1.0 - 4.8 thou/mm3 1.2 1.4 0.7 (L)   Monocytes Absolute 0.4 - 1.3 thou/mm3 0.5 0.7 0.5   Eosinophils Absolute 0.0 - 0.4 thou/mm3 0.1 0.1 0.0   Basophils Absolute 0.0 - 0.1 thou/mm3 0.0 0.1 0.0   Seg Neutrophils % 68.7 67.5 85.8   Segs Absolute 1.8 - 7.7 thou/mm3 4.4 5.1 8.7 (H)   Lymphocytes % 19.5 18.7 6.5   Monocytes % 7.3 9.7 4.9   Eosinophils % 1.2 0.9 0.0   Basophils % 0.5 0.7 0.3   Immature Grans (Abs) 0.00 - 0.07 thou/mm3 0.18 (H) 0.19 (H) 0.25 (H)   Immature Granulocytes % 2.8 2.5 2.5   (L): Data is abnormally low  (H): Data is abnormally high       Latest Reference Range & Units 1/27/23 05:45   Ferritin 10 - 291 ng/mL 790 (H)   Iron 50 - 170 ug/dL 46 (L)   Iron Saturation 20 - 50 % 25   TIBC 171 - 450 ug/dL 183   FOLATE, FOLAT 4.8 - 24.2 ng/mL 7.9   Vitamin B-12 211 - 911 pg/mL 1880 (H)   (H): Data is abnormally high  (L): Data is abnormally low       Latest Reference Range & Units 2/2/23 14:10 2/2/23 17:10   Color, UA STRAW-YELLOW  YELLOW YELLOW   Glucose, UA NEGATIVE mg/dl  NEGATIVE   Bilirubin, Urine NEGATIVE  NEGATIVE NEGATIVE   Ketones, Urine NEGATIVE  TRACE ! 15 ! Specific Gravity, UA 1.002 - 1.030  >1.030 ! Blood, Urine NEGATIVE  MODERATE ! NEGATIVE   pH, UA 5.0 - 9.0  5.0 5.5   Protein, UA NEGATIVE  NEGATIVE NEGATIVE   Urobilinogen, Urine 0.0 - 1.0 eu/dl 0.2 0.2   Nitrite, Urine NEGATIVE  NEGATIVE NEGATIVE   Leukocyte Esterase, Urine NEGATIVE  MODERATE ! NEGATIVE   Glucose, Urine NEGATIVE mg/dl NEGATIVE    Casts NONE SEEN /lpf  /lpf NONE SEEN  NONE SEEN    WBC, UA 0-4/hpf /hpf 25-50    RBC, UA 0-2/hpf /hpf 5-10    Epithelial Cells, UA 3-5/hpf /hpf 0-2    Renal Epithelial, UA NONE SEEN  NONE SEEN    Bacteria, UA FEW/NONE SEEN  FEW    Yeast, Urine NONE SEEN  NONE SEEN    Crystals NONE SEEN  NONE SEEN    Character, Urine CLEAR-SL CLOUD  CLEAR CLEAR   !: Data is abnormal      Respiratory sputum culture (2/2/2023) :  Component 2/2/23 1555    Gram Stain Result Moderate segmented neutrophils observed. Rare epithelial cells observed. Moderate gram negative bacilli.    Organism Pseudomonas aeruginosa Abnormal  P    Respiratory Culture heavy growth P    Organism gram negative bacilli Abnormal  P    Respiratory Culture light growth P    Susceptibility  Pseudomonas aeruginosa (1)  Antibiotic Interpretation Microscan  Method Status    cefepime Sensitive <=1 mcg/mL BACTERIAL SUSCEPTIBILITY PANEL BY DOMO Preliminary    piperacillin-tazobactam Sensitive <=4 mcg/mL BACTERIAL SUSCEPTIBILITY PANEL BY DOMO Preliminary    gentamicin Sensitive <=1 mcg/mL BACTERIAL SUSCEPTIBILITY PANEL BY DOMO Preliminary    ciprofloxacin Sensitive <=0.25 mcg/mL BACTERIAL SUSCEPTIBILITY PANEL BY DOMO Preliminary    tobramycin Sensitive <=1 mcg/mL BACTERIAL SUSCEPTIBILITY PANEL BY DOMO Preliminary        Chest x-ray (1/21/2023) : Impression   No acute cardiopulmonary. X-ray of abdomen (1/21/2023) : Impression   No acute process. CTA of the chest with and without contrast (1/21/2023) : Impression   1. Filling defects in the right lower lobe pulmonary arterial branches consistent with acute pulmonary emboli. No evidence of right ventricular strain. 2.. Atherosclerotic calcification in the thoracic aorta. 3. Borderline cardiomegaly. Calcification versus stent placement in the left anterior descending coronary artery. 4. Thickening off the interstitial lung markings. 5. Thoracic spondylosis. 6. Sclerotic density in the left eighth rib posteriorly. 7. Small hiatal hernia. 9. Results called to Catherine Harris   at 950 AM       Bilateral lower extremities venous duplex Doppler study (1/21/2023) : Impression   1. Areas of abnormal echogenicity in the right posterior tibial vein, left greater saphenous, left peroneal and left anterior tibial veins suspicious for thrombi. CT of facial bones without contrast (1/26/2023) : Impression   No evidence of dental abscess. CTA of the chest with and without contrast (2/1/2023) : Impression   1. Stable bilateral nonocclusive pulmonary emboli. Negative for CT evidence of right heart strain or thrombus propagation. No new pulmonary emboli since the prior study 1/21/23. 2. New trace bilateral pleural effusions. 3. Nonemergent/incidental findings as above. X-ray of pelvis and right hip (2/2/2023) : Impression   Acute right femoral intertrochanteric fracture, with subtrochanteric extension. Chest x-ray (2/2/2023) : Impression   No acute cardiopulmonary disease. Cardiomegaly. CT of cervical spine without contrast (2/2/2023) : Impression    No fracture. CT of head without contrast (2/2/2023) :   Impression    No evidence of an acute process. X-ray of abdomen (2/2/2023) : Impression   Resolved constipation. Right femoral intertrochanteric fracture. X-ray of right femur (2/3/2023) : Impression   Intraoperative imaging demonstrating open reduction internal fixation right hip         Echocardiogram (1/23/2023)  Conclusions Summary  Technically difficult study due to poor acoustic windows. Normal left ventricle size and systolic function. Ejection fraction was estimated at 60 to 65 %. There were no regional left ventricular wall motion abnormalities and wall thickness was within normal limits. Doppler parameters were consistent with abnormal left ventricular relaxation (grade 1 diastolic dysfunction). The left atrium is Mildly dilated. Impression:  Right femoral intertrochanteric fracture with subtrochanteric extension due to fall requiring open reduction and internal fixation with cephalomedullary nail  Glottic stenosis requiring tracheostomy  Diabetes mellitus type 2 with poor blood glucose control and complicated by bilateral lower extremities diabetic polyneuropathy  Morbid obesity  Hypertension  Hyperlipidemia  History of lower back pain with history of \"lumbar stress fracture\"  History of COVID infection with pneumonia  History of mild cognitive impairment  History of coronary artery disease requiring coronary artery stenting  Grade 1 left ventricular diastolic dysfunction with preserved ejection fraction    Continuing ongoing PT and OT treatment is medically indicated. I will also request speech therapy evaluation for her post tracheostomy status and to reevaluate her previous diagnosis mild cognitive impairment. The patient & her  disclosed patient had significant history of severe low back pain interfering her physical function starting in summer of 2022. There is no diagnostic imaging test on her low back done at [facility].   I will order a lumbar spine x-ray f complains of lower back pain.  The patient will benefit from a course of intensive inpatient rehabilitation treatment to improve her function. I think the patient should be able to tolerate the minimal 3-hour rehab intervention required for intensive inpatient rehabilitation program.  We plan to admit the patient to inpatient rehab service when the patient is medically stable to be discharged from acute hospital.      Recommendations:  Continue current PT and OT treatment while the patient remained in acute hospital  Speech therapy evaluation for tracheostomy status and previous history of mild cognitive impairment  X-ray of lumbar spine for complaint of lower back pain  Continue weightbearing as tolerated at the right lower extremity as per orthopedic service  Continue Xarelto for recently diagnosed DVT and pulmonary embolism  The patient will benefit from a course of intensive inpatient rehabilitation treatment to improve her function. Plan to admit patient to inpatient rehab service when all medically necessary diagnostic test and treatment are completed, and the patient is medically stable and cleared to be discharged from acute hospital      It was my pleasure to evaluate Slim Hager today. Please call with questions.       Kelsey Rodriguez MD

## 2023-02-05 NOTE — PROGRESS NOTES
Patient reports wanting to have a BM. Patient insisting that she can stand and get on a bedside commode. Discussed with patient that we could use lift equipment to get her up or we could use the bedpan. Patient refused lift stating, \"It leander hurt too much. \"  Patient placed on bedpan and unable to have BM after 10 minutes. Patient repositioned and pain medication given per [facility]

## 2023-02-05 NOTE — SIGNIFICANT EVENT
Notified per RN asking for anxiolytic. I called to speak with RN. She stated that patient is \"anxious about being in the hospital and fixated on her trach\". When asked, patient reports feeling \"extremely anxious\". MAR reviewed, Ativan 1mg IV given over 24 hours ago. Will give Xanax 0.25 mg tablet once for now.        Aaron Henry MD  PGY3, Internal Medicine

## 2023-02-06 ENCOUNTER — APPOINTMENT (OUTPATIENT)
Dept: GENERAL RADIOLOGY | Age: 73
End: 2023-02-06
Payer: MEDICARE

## 2023-02-06 ENCOUNTER — APPOINTMENT (OUTPATIENT)
Dept: MRI IMAGING | Age: 73
End: 2023-02-06
Payer: MEDICARE

## 2023-02-06 PROBLEM — S32.000A COMPRESSION OF LUMBAR VERTEBRA (HCC): Status: ACTIVE | Noted: 2023-02-06

## 2023-02-06 PROBLEM — G89.29 CHRONIC BILATERAL LOW BACK PAIN WITHOUT SCIATICA: Status: ACTIVE | Noted: 2023-02-06

## 2023-02-06 PROBLEM — M54.50 CHRONIC BILATERAL LOW BACK PAIN WITHOUT SCIATICA: Status: ACTIVE | Noted: 2023-02-06

## 2023-02-06 LAB
ABO: NORMAL
ANION GAP SERPL CALC-SCNC: 8 MEQ/L (ref 8–16)
ANTIBODY SCREEN: NORMAL
AUTO DIFF PNL BLD: ABNORMAL
BACTERIA SPEC RESP CULT: ABNORMAL
BACTERIA SPEC RESP CULT: ABNORMAL
BASOPHILS ABSOLUTE: 0 THOU/MM3 (ref 0–0.1)
BASOPHILS NFR BLD AUTO: 0.2 %
BILIRUB SERPL-MCNC: 0.5 MG/DL (ref 0.3–1.2)
BUN SERPL-MCNC: 22 MG/DL (ref 7–22)
CALCIUM SERPL-MCNC: 8.2 MG/DL (ref 8.5–10.5)
CHLORIDE SERPL-SCNC: 101 MEQ/L (ref 98–111)
CO2 SERPL-SCNC: 27 MEQ/L (ref 23–33)
CREAT SERPL-MCNC: 0.7 MG/DL (ref 0.4–1.2)
DAT POLY-SP REAG RBC QL: NORMAL
DEPRECATED RDW RBC AUTO: 56.5 FL (ref 35–45)
EOSINOPHIL NFR BLD AUTO: 0.3 %
EOSINOPHILS ABSOLUTE: 0 THOU/MM3 (ref 0–0.4)
ERYTHROCYTE [DISTWIDTH] IN BLOOD BY AUTOMATED COUNT: 15.9 % (ref 11.5–14.5)
FOLATE SERPL-MCNC: 4.7 NG/ML (ref 4.8–24.2)
GFR SERPL CREATININE-BSD FRML MDRD: > 60 ML/MIN/1.73M2
GLUCOSE BLD STRIP.AUTO-MCNC: 153 MG/DL (ref 70–108)
GLUCOSE BLD STRIP.AUTO-MCNC: 186 MG/DL (ref 70–108)
GLUCOSE BLD STRIP.AUTO-MCNC: 236 MG/DL (ref 70–108)
GLUCOSE BLD STRIP.AUTO-MCNC: 246 MG/DL (ref 70–108)
GLUCOSE SERPL-MCNC: 233 MG/DL (ref 70–108)
GRAM STN SPEC: ABNORMAL
HCT VFR BLD AUTO: 16.9 % (ref 37–47)
HCT VFR BLD AUTO: 20.7 % (ref 37–47)
HGB BLD-MCNC: 5.1 GM/DL (ref 12–16)
HGB BLD-MCNC: 6.5 GM/DL (ref 12–16)
IMM GRANULOCYTES # BLD AUTO: 0.46 THOU/MM3 (ref 0–0.07)
IMM GRANULOCYTES NFR BLD AUTO: 4.3 %
LDH SERPL L TO P-CCNC: 398 U/L (ref 100–190)
LYMPHOCYTES ABSOLUTE: 1.9 THOU/MM3 (ref 1–4.8)
LYMPHOCYTES NFR BLD AUTO: 17.8 %
MCH RBC QN AUTO: 30.5 PG (ref 26–33)
MCHC RBC AUTO-ENTMCNC: 30.2 GM/DL (ref 32.2–35.5)
MCV RBC AUTO: 101.2 FL (ref 81–99)
MONOCYTES ABSOLUTE: 1.2 THOU/MM3 (ref 0.4–1.3)
MONOCYTES NFR BLD AUTO: 11 %
NEUTROPHILS NFR BLD AUTO: 66.4 %
NRBC BLD AUTO-RTO: 2 /100 WBC
ORGANISM: ABNORMAL
ORGANISM: ABNORMAL
PATHOLOGIST REVIEW: ABNORMAL
PLATELET # BLD AUTO: 248 THOU/MM3 (ref 130–400)
PLATELET BLD QL SMEAR: ADEQUATE
PMV BLD AUTO: 9.5 FL (ref 9.4–12.4)
POLYCHROMASIA BLD QL SMEAR: ABNORMAL
POTASSIUM SERPL-SCNC: 4.8 MEQ/L (ref 3.5–5.2)
RBC # BLD AUTO: 1.67 MILL/MM3 (ref 4.2–5.4)
REASON FOR REJECTION: NORMAL
REJECTED TEST: NORMAL
RH FACTOR: NORMAL
SCAN OF BLOOD SMEAR: NORMAL
SEGMENTED NEUTROPHILS ABSOLUTE COUNT: 7 THOU/MM3 (ref 1.8–7.7)
SODIUM SERPL-SCNC: 136 MEQ/L (ref 135–145)
STOMATOCYTES: ABNORMAL
VIT B12 SERPL-MCNC: 969 PG/ML (ref 211–911)
WBC # BLD AUTO: 10.6 THOU/MM3 (ref 4.8–10.8)

## 2023-02-06 PROCEDURE — 99232 SBSQ HOSP IP/OBS MODERATE 35: CPT | Performed by: INTERNAL MEDICINE

## 2023-02-06 PROCEDURE — 6370000000 HC RX 637 (ALT 250 FOR IP): Performed by: NURSE PRACTITIONER

## 2023-02-06 PROCEDURE — 86900 BLOOD TYPING SEROLOGIC ABO: CPT

## 2023-02-06 PROCEDURE — 82746 ASSAY OF FOLIC ACID SERUM: CPT

## 2023-02-06 PROCEDURE — 99232 SBSQ HOSP IP/OBS MODERATE 35: CPT | Performed by: PHYSICAL MEDICINE & REHABILITATION

## 2023-02-06 PROCEDURE — 82607 VITAMIN B-12: CPT

## 2023-02-06 PROCEDURE — 6370000000 HC RX 637 (ALT 250 FOR IP): Performed by: INTERNAL MEDICINE

## 2023-02-06 PROCEDURE — 92523 SPEECH SOUND LANG COMPREHEN: CPT

## 2023-02-06 PROCEDURE — 82247 BILIRUBIN TOTAL: CPT

## 2023-02-06 PROCEDURE — 2580000003 HC RX 258: Performed by: NURSE PRACTITIONER

## 2023-02-06 PROCEDURE — 36415 COLL VENOUS BLD VENIPUNCTURE: CPT

## 2023-02-06 PROCEDURE — 86901 BLOOD TYPING SEROLOGIC RH(D): CPT

## 2023-02-06 PROCEDURE — 82948 REAGENT STRIP/BLOOD GLUCOSE: CPT

## 2023-02-06 PROCEDURE — P9016 RBC LEUKOCYTES REDUCED: HCPCS

## 2023-02-06 PROCEDURE — 94760 N-INVAS EAR/PLS OXIMETRY 1: CPT

## 2023-02-06 PROCEDURE — 36430 TRANSFUSION BLD/BLD COMPNT: CPT

## 2023-02-06 PROCEDURE — 85018 HEMOGLOBIN: CPT

## 2023-02-06 PROCEDURE — 2060000000 HC ICU INTERMEDIATE R&B

## 2023-02-06 PROCEDURE — 86880 COOMBS TEST DIRECT: CPT

## 2023-02-06 PROCEDURE — 2700000000 HC OXYGEN THERAPY PER DAY

## 2023-02-06 PROCEDURE — 99232 SBSQ HOSP IP/OBS MODERATE 35: CPT | Performed by: NURSE PRACTITIONER

## 2023-02-06 PROCEDURE — 72148 MRI LUMBAR SPINE W/O DYE: CPT

## 2023-02-06 PROCEDURE — 85025 COMPLETE CBC W/AUTO DIFF WBC: CPT

## 2023-02-06 PROCEDURE — 85014 HEMATOCRIT: CPT

## 2023-02-06 PROCEDURE — 83010 ASSAY OF HAPTOGLOBIN QUANT: CPT

## 2023-02-06 PROCEDURE — 80048 BASIC METABOLIC PNL TOTAL CA: CPT

## 2023-02-06 PROCEDURE — 86923 COMPATIBILITY TEST ELECTRIC: CPT

## 2023-02-06 PROCEDURE — 72100 X-RAY EXAM L-S SPINE 2/3 VWS: CPT

## 2023-02-06 PROCEDURE — 83615 LACTATE (LD) (LDH) ENZYME: CPT

## 2023-02-06 PROCEDURE — 86850 RBC ANTIBODY SCREEN: CPT

## 2023-02-06 RX ORDER — INSULIN LISPRO 100 [IU]/ML
0-8 INJECTION, SOLUTION INTRAVENOUS; SUBCUTANEOUS
Status: CANCELLED | OUTPATIENT
Start: 2023-02-06

## 2023-02-06 RX ORDER — GUAIFENESIN 200 MG/10ML
400 LIQUID ORAL 2 TIMES DAILY PRN
Status: CANCELLED | OUTPATIENT
Start: 2023-02-06

## 2023-02-06 RX ORDER — ASPIRIN 81 MG/1
81 TABLET, CHEWABLE ORAL DAILY
Status: CANCELLED | OUTPATIENT
Start: 2023-02-07

## 2023-02-06 RX ORDER — LIDOCAINE 4 G/G
2 PATCH TOPICAL DAILY
Status: CANCELLED | OUTPATIENT
Start: 2023-02-07

## 2023-02-06 RX ORDER — ACETAMINOPHEN 325 MG/1
650 TABLET ORAL EVERY 4 HOURS PRN
Status: CANCELLED | OUTPATIENT
Start: 2023-02-06

## 2023-02-06 RX ORDER — SODIUM CHLORIDE 0.9 % (FLUSH) 0.9 %
5-40 SYRINGE (ML) INJECTION PRN
Status: CANCELLED | OUTPATIENT
Start: 2023-02-06

## 2023-02-06 RX ORDER — BISACODYL 10 MG
10 SUPPOSITORY, RECTAL RECTAL DAILY PRN
Status: CANCELLED | OUTPATIENT
Start: 2023-02-06

## 2023-02-06 RX ORDER — OXYCODONE HYDROCHLORIDE 5 MG/1
5 TABLET ORAL EVERY 4 HOURS PRN
Status: CANCELLED | OUTPATIENT
Start: 2023-02-06

## 2023-02-06 RX ORDER — INSULIN LISPRO 100 [IU]/ML
0-4 INJECTION, SOLUTION INTRAVENOUS; SUBCUTANEOUS NIGHTLY
Status: CANCELLED | OUTPATIENT
Start: 2023-02-06

## 2023-02-06 RX ORDER — SODIUM CHLORIDE 9 MG/ML
INJECTION, SOLUTION INTRAVENOUS PRN
Status: CANCELLED | OUTPATIENT
Start: 2023-02-06

## 2023-02-06 RX ORDER — DOCUSATE SODIUM 100 MG/1
100 CAPSULE, LIQUID FILLED ORAL 2 TIMES DAILY
Status: CANCELLED | OUTPATIENT
Start: 2023-02-06

## 2023-02-06 RX ORDER — TRAZODONE HYDROCHLORIDE 50 MG/1
50 TABLET ORAL NIGHTLY PRN
Status: CANCELLED | OUTPATIENT
Start: 2023-02-06

## 2023-02-06 RX ORDER — POTASSIUM CHLORIDE 7.45 MG/ML
10 INJECTION INTRAVENOUS PRN
Status: CANCELLED | OUTPATIENT
Start: 2023-02-06

## 2023-02-06 RX ORDER — METOPROLOL SUCCINATE 25 MG/1
25 TABLET, EXTENDED RELEASE ORAL DAILY
Status: DISCONTINUED | OUTPATIENT
Start: 2023-02-07 | End: 2023-02-10 | Stop reason: HOSPADM

## 2023-02-06 RX ORDER — METOPROLOL SUCCINATE 25 MG/1
12.5 TABLET, EXTENDED RELEASE ORAL DAILY
Status: CANCELLED | OUTPATIENT
Start: 2023-02-07

## 2023-02-06 RX ORDER — INSULIN GLARGINE 100 [IU]/ML
13 INJECTION, SOLUTION SUBCUTANEOUS
Status: CANCELLED | OUTPATIENT
Start: 2023-02-07

## 2023-02-06 RX ORDER — ALBUTEROL SULFATE 90 UG/1
2 AEROSOL, METERED RESPIRATORY (INHALATION) EVERY 6 HOURS PRN
Status: CANCELLED | OUTPATIENT
Start: 2023-02-06

## 2023-02-06 RX ORDER — FUROSEMIDE 20 MG/1
20 TABLET ORAL DAILY
Status: CANCELLED | OUTPATIENT
Start: 2023-02-07

## 2023-02-06 RX ORDER — MAGNESIUM SULFATE IN WATER 40 MG/ML
2000 INJECTION, SOLUTION INTRAVENOUS PRN
Status: CANCELLED | OUTPATIENT
Start: 2023-02-06

## 2023-02-06 RX ORDER — LANOLIN ALCOHOL/MO/W.PET/CERES
3 CREAM (GRAM) TOPICAL NIGHTLY
Status: CANCELLED | OUTPATIENT
Start: 2023-02-06

## 2023-02-06 RX ORDER — DEXTROSE MONOHYDRATE 100 MG/ML
INJECTION, SOLUTION INTRAVENOUS CONTINUOUS PRN
Status: CANCELLED | OUTPATIENT
Start: 2023-02-06

## 2023-02-06 RX ORDER — ONDANSETRON 4 MG/1
4 TABLET, ORALLY DISINTEGRATING ORAL EVERY 8 HOURS PRN
Status: CANCELLED | OUTPATIENT
Start: 2023-02-06

## 2023-02-06 RX ORDER — SODIUM CHLORIDE 9 MG/ML
INJECTION, SOLUTION INTRAVENOUS PRN
Status: DISCONTINUED | OUTPATIENT
Start: 2023-02-06 | End: 2023-02-10 | Stop reason: HOSPADM

## 2023-02-06 RX ORDER — POLYETHYLENE GLYCOL 3350 17 G/17G
17 POWDER, FOR SOLUTION ORAL 2 TIMES DAILY
Status: CANCELLED | OUTPATIENT
Start: 2023-02-06

## 2023-02-06 RX ORDER — CIPROFLOXACIN 500 MG/1
500 TABLET, FILM COATED ORAL EVERY 12 HOURS SCHEDULED
Status: CANCELLED | OUTPATIENT
Start: 2023-02-06 | End: 2023-02-12

## 2023-02-06 RX ORDER — OXYCODONE HYDROCHLORIDE 5 MG/1
2.5 TABLET ORAL EVERY 4 HOURS PRN
Status: CANCELLED | OUTPATIENT
Start: 2023-02-06

## 2023-02-06 RX ORDER — QUETIAPINE FUMARATE 25 MG/1
25 TABLET, FILM COATED ORAL 2 TIMES DAILY
Status: CANCELLED | OUTPATIENT
Start: 2023-02-06

## 2023-02-06 RX ORDER — ROSUVASTATIN CALCIUM 10 MG/1
10 TABLET, COATED ORAL DAILY
Status: CANCELLED | OUTPATIENT
Start: 2023-02-07

## 2023-02-06 RX ORDER — POLYETHYLENE GLYCOL 3350 17 G/17G
17 POWDER, FOR SOLUTION ORAL DAILY PRN
Status: CANCELLED | OUTPATIENT
Start: 2023-02-06

## 2023-02-06 RX ORDER — SENNA PLUS 8.6 MG/1
2 TABLET ORAL NIGHTLY
Status: CANCELLED | OUTPATIENT
Start: 2023-02-06

## 2023-02-06 RX ORDER — INSULIN GLARGINE 100 [IU]/ML
28 INJECTION, SOLUTION SUBCUTANEOUS NIGHTLY
Status: CANCELLED | OUTPATIENT
Start: 2023-02-06

## 2023-02-06 RX ORDER — ACETAMINOPHEN 325 MG/1
650 TABLET ORAL EVERY 6 HOURS
Status: CANCELLED | OUTPATIENT
Start: 2023-02-06

## 2023-02-06 RX ORDER — INSULIN LISPRO 100 [IU]/ML
5 INJECTION, SOLUTION INTRAVENOUS; SUBCUTANEOUS
Status: CANCELLED | OUTPATIENT
Start: 2023-02-06

## 2023-02-06 RX ORDER — POTASSIUM CHLORIDE 20 MEQ/1
40 TABLET, EXTENDED RELEASE ORAL PRN
Status: CANCELLED | OUTPATIENT
Start: 2023-02-06

## 2023-02-06 RX ORDER — SODIUM CHLORIDE 0.9 % (FLUSH) 0.9 %
5-40 SYRINGE (ML) INJECTION EVERY 12 HOURS SCHEDULED
Status: CANCELLED | OUTPATIENT
Start: 2023-02-06

## 2023-02-06 RX ADMIN — RIVAROXABAN 15 MG: 15 TABLET, FILM COATED ORAL at 09:08

## 2023-02-06 RX ADMIN — CIPROFLOXACIN 500 MG: 500 TABLET, FILM COATED ORAL at 21:00

## 2023-02-06 RX ADMIN — INSULIN LISPRO 2 UNITS: 100 INJECTION, SOLUTION INTRAVENOUS; SUBCUTANEOUS at 09:12

## 2023-02-06 RX ADMIN — ASPIRIN 81 MG 81 MG: 81 TABLET ORAL at 09:07

## 2023-02-06 RX ADMIN — SENNOSIDES 8.6 MG: 8.6 TABLET, FILM COATED ORAL at 19:54

## 2023-02-06 RX ADMIN — INSULIN LISPRO 5 UNITS: 100 INJECTION, SOLUTION INTRAVENOUS; SUBCUTANEOUS at 09:18

## 2023-02-06 RX ADMIN — INSULIN GLARGINE 28 UNITS: 100 INJECTION, SOLUTION SUBCUTANEOUS at 21:00

## 2023-02-06 RX ADMIN — INSULIN LISPRO 2 UNITS: 100 INJECTION, SOLUTION INTRAVENOUS; SUBCUTANEOUS at 12:10

## 2023-02-06 RX ADMIN — ROSUVASTATIN 10 MG: 10 TABLET, FILM COATED ORAL at 09:08

## 2023-02-06 RX ADMIN — INSULIN LISPRO 5 UNITS: 100 INJECTION, SOLUTION INTRAVENOUS; SUBCUTANEOUS at 18:05

## 2023-02-06 RX ADMIN — OXYCODONE HYDROCHLORIDE 2.5 MG: 5 TABLET ORAL at 06:38

## 2023-02-06 RX ADMIN — INSULIN LISPRO 5 UNITS: 100 INJECTION, SOLUTION INTRAVENOUS; SUBCUTANEOUS at 12:10

## 2023-02-06 RX ADMIN — POLYETHYLENE GLYCOL (3350) 17 G: 17 POWDER, FOR SOLUTION ORAL at 19:54

## 2023-02-06 RX ADMIN — INSULIN GLARGINE 13 UNITS: 100 INJECTION, SOLUTION SUBCUTANEOUS at 09:12

## 2023-02-06 RX ADMIN — CIPROFLOXACIN 500 MG: 500 TABLET, FILM COATED ORAL at 09:07

## 2023-02-06 RX ADMIN — SODIUM CHLORIDE, PRESERVATIVE FREE 10 ML: 5 INJECTION INTRAVENOUS at 19:55

## 2023-02-06 RX ADMIN — QUETIAPINE FUMARATE 25 MG: 25 TABLET ORAL at 09:08

## 2023-02-06 RX ADMIN — QUETIAPINE FUMARATE 25 MG: 25 TABLET ORAL at 19:54

## 2023-02-06 RX ADMIN — POLYETHYLENE GLYCOL (3350) 17 G: 17 POWDER, FOR SOLUTION ORAL at 09:58

## 2023-02-06 RX ADMIN — FUROSEMIDE 20 MG: 20 TABLET ORAL at 09:07

## 2023-02-06 RX ADMIN — RIVAROXABAN 15 MG: 15 TABLET, FILM COATED ORAL at 18:05

## 2023-02-06 ASSESSMENT — PAIN DESCRIPTION - FREQUENCY
FREQUENCY: CONTINUOUS

## 2023-02-06 ASSESSMENT — ENCOUNTER SYMPTOMS
VOMITING: 0
COUGH: 0
WHEEZING: 0
CONSTIPATION: 0
NAUSEA: 0
SORE THROAT: 0
RHINORRHEA: 0
ABDOMINAL PAIN: 0
SHORTNESS OF BREATH: 0
TROUBLE SWALLOWING: 0
BACK PAIN: 1
DIARRHEA: 0

## 2023-02-06 ASSESSMENT — PAIN DESCRIPTION - PAIN TYPE
TYPE: ACUTE PAIN

## 2023-02-06 ASSESSMENT — PAIN SCALES - GENERAL
PAINLEVEL_OUTOF10: 6
PAINLEVEL_OUTOF10: 5
PAINLEVEL_OUTOF10: 6
PAINLEVEL_OUTOF10: 7

## 2023-02-06 ASSESSMENT — PAIN DESCRIPTION - LOCATION
LOCATION: HIP

## 2023-02-06 ASSESSMENT — PAIN DESCRIPTION - ORIENTATION
ORIENTATION: RIGHT

## 2023-02-06 ASSESSMENT — PAIN DESCRIPTION - ONSET
ONSET: ON-GOING

## 2023-02-06 ASSESSMENT — PAIN DESCRIPTION - DESCRIPTORS
DESCRIPTORS: ACHING

## 2023-02-06 NOTE — PROGRESS NOTES
[facility] ICU STEPDOWN TELEMETRY 4K  Speech - Language - Cognitive Evaluation    SLP Individual Minutes  Time In: 2528  Time Out: 1487  Minutes: 25  Timed Code Treatment Minutes: 0 Minutes       Date: 2023  Patient Name: Adrian Radford      CSN: [id number]   : 1950  (81 y.o.)  Gender: female   Referring Physician:  Poncho Steiner MD  Diagnosis: Posterior right femur fracture repair  Precautions: fall risk, confusion   History of Present Illness/Injury: Per medical chart review, Corie Garcia is a 81 y.o. right-handed obese  female hypertension, diabetes mellitus type 2 with bilateral lower extremities diabetic neuropathy, coronary artery disease requiring coronary artery stenting, recently diagnosed (2023) right lower extremity DVT and right lower lobe pulmonary embolism requiring anticoagulation therapy with Xarelto, COVID-pneumonia, mild impaired cognition, low back pain due to \"stress fracture\", status post bilateral eyes cataract surgeries, status post 3  sections, status post hysterectomy, status post hiatal hernia repair, status post sinus surgery, status post tracheostomy 2022 for glottic stenosis, was admitted to [facility] on 2023 for complaints of nausea, leg swelling complicated by fall accident in ER. The patient previously was admitted to inpatient rehab service from 2022 to 2022 due to critical care myopathy and debility/physical decondition as result of COVID-19 pneumonia. She was discharged to home with referral for home care service on 2022. The patient developed progressive difficulty breathing in 2022 and was found to have glottic stenosis and eventually received tracheostomy by Dr. Preethi Kimball on 2022. The patient's  says the patient also developed progressively worsening lower back pain in summer 2022 and saw orthopedics surgeon at [facility].   The patient has been says the patient was diagnosed of having \"lumbar spine stress fracture\". No surgical intervention was performed. She was treated with brace which she later abandoned due to discomfort associate with brace usage. The patient was recently hospitalized from 1/21/2023 to 1/29/2023 initially for abdominal bloating, nausea and leg swelling. She was found to have elevated D-dimer. Subsequent testing revealed right lower lobe pulmonary embolism and right lower extremity DVT. She was at initially treated with IV heparin and later transition to [address] [place] [address]. The patient was brought to ER on 2/1/2022 because of progressively increased leg swelling, and nausea/vomiting associated with decreased oral intake. She was found to have BNP of 256.7 and Bumex was prescribed. She then had a fall accident  when she was going to toilet while she was in ER on 2/1/2022. The fall resulted severe right hip pain. X-ray of right hip and pelvis revealed acute right femoral intertrochanteric fracture with subtrochanteric extension. Orthopedic service was consulted. Xarelto was held in preparation for surgical management. Cardiology was consulted on 2/2/2023 for elevated BNP and Lasix was started. Because of difficulty voiding with urinary retention, urology was also consulted on 2/2/2023. Urology service placed Wen with some difficulty on 2/2/2023. Therefore Wen was kept in place. On 2/3/2023 the patient underwent open treatment of right intertrochanteric fracture with cephalomedullary nail by Dr. Mellissa Arboleda. She is allowed weightbearing as tolerated at the right lower extremity postoperatively. At the present time the patient complains of constant aching pain with intermittent sharp pain at her right groin and hip area. Her right lateral thigh also is somewhat painful. She rates the pain intensity at 7/10 level. Her right lower extremity feels weak due to the pain.   She says her bilateral feet feel numb due to history of diabetic neuropathy. She also continues having pain at her low back area. She denies having headache, dizziness, lightheadedness, shortness of breath, chest pain, nausea or vomiting, abdominal pain, diarrhea. She is still on Wen catheter. She says she only slept 3 hours yesterday. \"    ST to evaluate cognitive-communication functioning. Past Medical History:   Diagnosis Date    Arthritis     Coronary artery disease     COVID     Diabetic neuropathy ([facility] 2021    Bilateral feet numbness    DVT (deep venous thrombosis) ([facility] 01/21/2023    Right lower extremity    Glottic stenosis     3/2022    Hyperlipidemia     Lower back pain 2022    With diagnosis of having \"stress fracture\" by ortho at [address] [place] [address]    Primary hypertension     Pulmonary embolism ([facility] 01/21/2023    Right lower lobe    Type 2 diabetes mellitus ([facility] 2018       Pain: No pain reported. Subjective:  Patient seen with RN Ana waller. Patient known to this ST from previous home health care services rendered within the past year. Patient with periods of waning alertness and confusion, requiring re-direction to tasks several times throughout evaluation. Tangential within conversation. PMV in place for communication this visit; Independently donned and doffed x1. 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  helps with toileting  Hobbies: gardening   Vision Status: uses \"cheaters\"  Hearing: WFL  Type of Home: House  Home Layout: One level  Home Access: Ramped entrance  Home Equipment: Euel Dash, rolling (BSC)    SPEECH / VOICE:  Speech: WFL    Voice: At least moderate dysphonia.     Sustained phonation - max phonation time of 7 seconds for /i/ and 4 seconds for /a/. Poor pitch control. Strained and hoarse vocal quality. Patient is known to ENT practice and has an upcoming planned procedure with Dr. Tyler Smith; ENT following during hospitalization    Passy Loraine Mess speaking valve in place for duration of evaluation. Tolerates fair and is aware of when to doff. LANGUAGE:  Receptive:  Receptive language skills appear to be grossly intact for basic and complex daily communication. Expressive:  Conversational Speech: Tangential, Poor topic maintenance, Reduced thought organization      COGNITION:  Cognitive-Log: total score 14/30    Immediate Recall: Impaired; 0/3  Short-Term Recall: Impaired;1/4 indep, 3/4 with forced choice cues  Problem Solving: Impaired  Reasoning: Impaired - Stated she is leaving and needs to start packing her belongings; Difficulty comprehending eventual transition to inpatient rehabilitation floor  Sequencing: Impaired, verbally   Thought Organization: Impaired; Poor cohesion and ability to maintain topic   Insight: Impaired    Additional observations:  *Difficulty using remote control  *Confusion; stated her  tried to take her home last night  *Difficulty with expression and decision making for lunch meal when dietary entered room  *Responding to stimuli with unrelated responses    SWALLOWING:  Current Diet: Regular. Thin liquids. [facility] - Patient and nursing decline difficulties. RECOMMENDATIONS/ASSESSMENT:  DIAGNOSTIC IMPRESSIONS: The patient presents with at least moderate cognitive-linguistic impairments characterized by deficits in verbal problem solving, reasoning, thought organization, decision making, attention, and immediate and delayed recall. Confusion also present, limiting ability to participate in more structured, lengthy cognitive assessment. Moderate dysphonia (hoarseness, strain) also present, however patient at baseline level of vocal quality functioning due to known tracheal stenosis. Patient is current with ENT practice and is being followed this admission. No dysarthria, dysphagia, or specific expressive or receptive language impairments. Recommending skilled cognitive-linguistic treatment to address areas of impairment noted above to improve safety and decision making within the current and future living environments. Rehabilitation Potential: good  Discharge Recommendations: Inpatient Rehabilitation    EDUCATION:  Learner: Patient  Education:  Reviewed results and recommendations of this evaluation, Reviewed ST goals and Plan of Care, and Education Related to [employer] and Wellness  Evaluation of Education: Verbalizes understanding, Demonstrates with assistance, Needs further instruction, No evidence of learning, and Family not present    PLAN:  Skilled SLP intervention on acute care 3-5 x per week or until goals met and/or pt plateaus in function. Specific interventions for next session may include: cognitive exercises, strategy training. PATIENT GOAL:    Return to prior level of function. SHORT TERM GOALS:   Goal 1: Patient will complete functional problem solving and reasoning exercises with 70% accuracy given mod cues to improve decision making within the current and future environments. Goal 2: Patient will complete functional immediate, working, and delayed recall tasks of 4+ units of information, with or without use of trained recall strategies, with 70% accuracy given mod cues to improve retention of pertinent medical information. Goal 3: Patient will complete functional thought organization and sequencing exercises, with no more than 3 re-directions/cues per trial, to improve clarity of thought for safe decision making. LONG TERM GOALS:  No established LTGs due to short ELOS.       Juan Carlos Wesley M.S., [address] [place] [address]

## 2023-02-06 NOTE — PROGRESS NOTES
[address] [address]  Acute Inpatient Rehab Preadmission Assessment    Patient Name: Francisco Javier Lyle        Ethnicity:Not of , [address], or [place]ian origin  Race:White  MRN: [id number]    : 1950  (71 y.o.)  Gender: female     Admitted from:[address]  Initial Assessment    Date of admission to the hospital: 2023 10:03 PM  Date patient eligible for admission:2023    Primary Diagnosis: Right hip fracture      Did patient have surgery? yes - Procedure: Open treatment right intertrochanteric femur fracture the cephalomedullary nail   Dr. Merary Colmenares 2/3/2023     Physicians: Farzad Anderson MD, Dr. Mello Lynch, Dr. Merary Colmenares, Dr. Andria Tena for clinical complications/co-morbidities:   Past Medical History:   Diagnosis Date    Arthritis     Coronary artery disease     COVID     Diabetic neuropathy ([address]     Bilateral feet numbness    DVT (deep venous thrombosis) ([address] 2023    Right lower extremity    Glottic stenosis     3/2022    Hyperlipidemia     Lower back pain     With diagnosis of having \"stress fracture\" by ortho at [facility]    Primary hypertension     Pulmonary embolism ([address] 2023    Right lower lobe    Type 2 diabetes mellitus ([address] 2018       Financial Information  Primary insurance: Medicare    Secondary Insurance:  [employer]    Has the patient had two or more falls in the past year or any fall with injury in the past year? yes    Did the patient have major surgery during the 100 days prior to admission?   yes    Precautions: Restrictions/Precautions: General Precautions, Fall Risk, Weight Bearing  Other position/activity restrictions: tracheostomy/collar with moist air, 1-2 L at baseline  Right Lower Extremity Weight Bearing: Weight Bearing As Tolerated      Isolation Precautions: None       Physiatrist: Dr. Mello Lynch    Patients Occupation: Retired    Reviewed Lab and Diagnostic reports from Current Admission: Yes    Patients Prior Functional  Level: Prior Function  ADL Assistance: Needs assistance  Homemaking Assistance: Needs assistance  Ambulation Assistance: Independent  Transfer Assistance: Independent  Additional Comments: pt amb short distances in the home with RW,  able to assist as needed. Current functional status for upper extremity ADLs: Moderate assistance    Current functional status for lower extremity ADLs: Dependent     Current functional status for bed, chair, wheelchair transfers:   Sit to Stand: Moderate Assistance, X 2, with Lonnie Hari, cues for hand placement. From [facility]   Stand to Sit: Minimal Assistance, X 2, with Lonnie Hari. To EOB    Current functional status for toilet transfers: Moderate assistance x2 Lonnie Hari for toilet transfers    Current functional status for locomotion: N/T    Current functional status for bladder management: Maximal Assistance    Current functional status for bowel management:Moderate assistance    Current functional status for comprehension: Moderate assistance    Current functional status for expression: Moderate assistance    Current functional status for social interaction: Moderate assistance    Current functional status for problem solving: Moderate assistance    Current functional status for memory: Moderate assistance    Expected level of Improvement in Self-Care: Moderate assistance    Expected level of Improvement in Sphincter Control: Moderate assistance    Expected level of Improvement in Transfers:  Moderate assistance    Expected level of Improvement in Locomotion:  Moderate assistance    Expected level of Improvement in Communication and Social Cognition: Moderate assistance    Expected length of time to achieve that level of improvement: 2 weeks    Current rehab issues: ADL dysfunction,bladder management, bowel management,carry over of therapy techniques, discharge planning, disease and co-morbidity management, gait/mobility dysfunction, medication management, nutrition and hydration management, Ongoing assessment of safety, Pain management, Patient and family education, Prevention of secondary complications, Skin Integrity,  cognitive impairment, communication impairment. Required therapy: Physical Therapy, Occupational Therapy and Speech Therapy 3 hours per day, 5-6 days per week. Recreational Therapy 1 hour per week. Expected Discharge Destination: Home    Expected Post Discharge Treatments: Home Care    Other information relevant to the care needs:   Lives With: Spouse  Type of Home: House  Home Layout: One level  Home Access: Ramped entrance  Bathroom Shower/Tub: Walk-in shower  Bathroom Toilet: Standard  Bathroom Equipment: Built-in shower seat  Home Equipment: emil Nunes (BSC)  Has the patient had two or more falls in the past year or any fall with injury in the past year?: Yes  ADL Assistance: Needs assistance  Homemaking Assistance: Needs assistance  Ambulation Assistance: Independent  Transfer Assistance: Independent  Active : No  Patient's  Info: Spouse provides transportation services  Additional Comments: pt amb short distances in the home with RW,  able to assist as needed. Acute Inpatient Rehabilitation Disclosure Statement provided to patient. Patient verbalized understanding. Patient requires intensive PT/OT/ST along with 24 hour nursing care and close physician supervision to manage patient multiple medical complexities. Patient plans to return to home with family. I have reviewed and concur with the findings and results of the pre-admission screening assessment completed by the Inpatient Rehabilitation Admissions Coordinator.     Sid Echols MD

## 2023-02-06 NOTE — PROGRESS NOTES
Comprehensive Nutrition Assessment    Type and Reason for Visit:  Initial, Consult (Heart Failure)    Nutrition Recommendations/Plan:   Consider add CHF diet to current diet orders. Left written copy of CHF diet at bedside per consult orders. Monitor intake, labs, wound healing, & wt. Send Miguel BID. Consider MVI. Malnutrition Assessment:  Malnutrition Status:  Insufficient data (02/06/23 1538)    Context:  Acute Illness     Findings of the 6 clinical characteristics of malnutrition:  Energy Intake:  No significant decrease in energy intake  Weight Loss:  No significant weight loss     Body Fat Loss:  Unable to assess     Muscle Mass Loss:  Unable to assess    Fluid Accumulation:  Moderate to Severe Extremities   Strength:  Not Performed    Nutrition Assessment:     Pt. nutritionally compromised AEB wounds. At risk for further nutrition compromise r/t increased nutrient needs for wound healing s/p Open treatment rt intertrochanteric femur fracture cephalomedullary nail (2/3/23), Acute on Chronic CHF underlying medical condition (Hx CAD, Covid, HLD, HTN, Type II DM). Nutrition Related Findings:    Pt. Report/Treatments/Miscellaneous: Pt off unit x 2. GI Status: No BM. Pertinent Labs: (2/6) Glucose 233, Hemoglobin 5.1, chemsticks 236-246, Ca Serum 8.2, (2/2) Troponin T 0.025, Iron 48  Pertinent Meds: Lasix, Lantus, Humalog, Glycolax, Senokot     Wound Type: Surgical Incision (incision thigh anterior proximal Rt ( Open treatment rt intertrochanteric femur fracture cardiomedullary nail 2/3/23))       Current Nutrition Intake & Therapies:    Average Meal Intake: %     ADULT DIET; Regular; 4 carb choices (60 gm/meal)  ADULT ORAL NUTRITION SUPPLEMENT; Breakfast, Dinner;  Wound Healing Oral Supplement    Anthropometric Measures:  Height: 5' 2\" (157.5 cm)  Ideal Body Weight (IBW): 110 lbs (50 kg)    Admission Body Weight: 227 lb 1.2 oz (103 kg) ((2/4) + 1 RUE, LUE, + 3 RLE & + 2 LLE edema)  Current Body Weight: 225 lb 6.4 oz (102.2 kg) ((2/6) + 1 RUE, LUE, + 2 RLE, LLE , pitting facial edema),   IBW. Current BMI (kg/m2): 41.2  Usual Body Weight:  (per EMR: (1/27) 210# bedscale, (1/26) 210# 12.2oz bedscale, (1/24)174# 9.6oz standscale, (1/21)174# 14.4oz, (11/22/02) 200#, (11/21/22) 200#)                       BMI Categories: Obese Class 3 (BMI 40.0 or greater)    Estimated Daily Nutrient Needs:  Energy Requirements Based On: Kcal/kg (102.2kgm (2/6))  Weight Used for Energy Requirements:  (102.2kgm  (2/6))  Energy (kcal/day): 1226-1533kcals (12-15kcals/kgm)  Weight Used for Protein Requirements: Ideal (50kgm IBW)  Protein (g/day): greater than 100 grams ( greater than 2 grams protein/kgm IBW)     Nutrition Diagnosis:   Increased nutrient needs related to increase demand for energy/nutrients as evidenced by wounds    Nutrition Interventions:   Food and/or Nutrient Delivery: Continue Current Diet  Nutrition Education/Counseling: Education initiated ((2/6) left written CHF diet materials in pt's room)  Coordination of Nutrition Care: Continue to monitor while inpatient, Interdisciplinary Rounds       Goals:     Goals: PO intake 75% or greater, by next RD assessment       Nutrition Monitoring and Evaluation:      Food/Nutrient Intake Outcomes: Diet Advancement/Tolerance, Food and Nutrient Intake, Supplement Intake  Physical Signs/Symptoms Outcomes: Biochemical Data, Chewing or Swallowing, GI Status, Fluid Status or Edema, Hemodynamic Status, Nutrition Focused Physical Findings, Skin, Weight    Discharge Planning:     Too soon to determine     Arnold Thompson RD, LD  Contact: [phone]

## 2023-02-06 NOTE — CARE COORDINATION
2/6/23, 7:20 AM EST    DISCHARGE PLANNING EVALUATION    Received a SW consult for \"dispo plans\". SW is already involved in the case, as we are planning IPR v. ECF. Patient is also current with [facility].

## 2023-02-06 NOTE — PLAN OF CARE
Problem: Discharge Planning  Goal: Discharge to home or other facility with appropriate resources  2/5/2023 2223 by Santino West RN  Outcome: Progressing  Flowsheets (Taken 2/5/2023 2040)  Discharge to home or other facility with appropriate resources:   Identify barriers to discharge with patient and caregiver   Arrange for needed discharge resources and transportation as appropriate   Identify discharge learning needs (meds, wound care, etc)   Refer to discharge planning if patient needs post-hospital services based on physician order or complex needs related to functional status, cognitive ability or social support system  2/5/2023 1508 by Curry Ying  Outcome: Progressing     Problem: Safety - Adult  Goal: Free from fall injury  2/5/2023 2223 by Santino West RN  Outcome: Progressing  [address] (Taken 2/5/2023 2000)  Free From Fall Injury: Instruct family/caregiver on patient safety  2/5/2023 1508 by Curry Ying  Outcome: Progressing     Problem: Skin/Tissue Integrity  Goal: Absence of new skin breakdown  Description: 1. Monitor for areas of redness and/or skin breakdown  2. Assess vascular access sites hourly  3. Every 4-6 hours minimum:  Change oxygen saturation probe site  4. Every 4-6 hours:  If on nasal continuous positive airway pressure, respiratory therapy assess nares and determine need for appliance change or resting period. 2/5/2023 2223 by Santino West RN  Outcome: Progressing  2/5/2023 1508 by Curry Ying  Outcome: Progressing     Problem: Neurosensory - Adult  Goal: Achieves stable or improved neurological status  2/5/2023 2223 by Santino West RN  Outcome: Progressing  Flowsheets (Taken 2/5/2023 2040)  Achieves stable or improved neurological status:   Assess for and report changes in neurological status   Maintain blood pressure and fluid volume within ordered parameters to optimize cerebral perfusion and minimize risk of hemorrhage   Monitor temperature, glucose, and sodium.  Initiate appropriate interventions as ordered  2/5/2023 1508 by Luiza Gar  Outcome: Progressing  Goal: Achieves maximal functionality and self care  2/5/2023 2223 by Celia Guerrero RN  Outcome: Progressing  Flowsheets (Taken 2/5/2023 2040)  Achieves maximal functionality and self care: Monitor swallowing and airway patency with patient fatigue and changes in neurological status  2/5/2023 1508 by Luiza Gar  Outcome: Progressing     Problem: Respiratory - Adult  Goal: Achieves optimal ventilation and oxygenation  2/5/2023 2223 by Celia Guerrero RN  Outcome: Progressing  Flowsheets (Taken 2/5/2023 2040)  Achieves optimal ventilation and oxygenation:   Assess for changes in respiratory status   Assess for changes in mentation and behavior   Position to facilitate oxygenation and minimize respiratory effort   Oxygen supplementation based on oxygen saturation or arterial blood gases   Assess the need for suctioning and aspirate as needed   Assess and instruct to report shortness of breath or any respiratory difficulty   Respiratory therapy support as indicated  2/5/2023 1508 by Luiza Gar  Outcome: Progressing     Problem: Cardiovascular - Adult  Goal: Maintains optimal cardiac output and hemodynamic stability  2/5/2023 2223 by Celia Guerrero RN  Outcome: Progressing  Flowsheets (Taken 2/5/2023 2040)  Maintains optimal cardiac output and hemodynamic stability: Monitor blood pressure and heart rate  2/5/2023 1508 by Luiza Gar  Outcome: Progressing     Problem: Genitourinary - Adult  Goal: Absence of urinary retention  2/5/2023 2223 by Celia Guerrero RN  Outcome: Progressing  Flowsheets (Taken 2/5/2023 2040)  Absence of urinary retention: Assess patients ability to void and empty bladder  2/5/2023 1508 by Luiza Gar  Outcome: Progressing  Goal: Urinary catheter remains patent  2/5/2023 2223 by Celia Guerrero RN  Outcome: Progressing  Flowsheets (Taken 2/5/2023 2040)  Urinary catheter remains patent: Assess patency of urinary catheter  2/5/2023 1508 by Sandrita Sommer  Outcome: Progressing     Problem: Infection - Adult  Goal: Absence of infection at discharge  2/5/2023 2223 by Kenyon Gagnon RN  Outcome: Progressing  Flowsheets (Taken 2/5/2023 2040)  Absence of infection at discharge:   Assess and monitor for signs and symptoms of infection   Monitor lab/diagnostic results   Monitor all insertion sites i.e., indwelling lines, tubes and drains   Administer medications as ordered   Instruct and encourage patient and family to use good hand hygiene technique  2/5/2023 1508 by Sandriat Sommer  Outcome: Progressing  Goal: Absence of infection during hospitalization  2/5/2023 2223 by Kenyon Gagnon RN  Outcome: Progressing  Flowsheets (Taken 2/5/2023 2040)  Absence of infection during hospitalization:   Assess and monitor for signs and symptoms of infection   Monitor lab/diagnostic results   Monitor all insertion sites i.e., indwelling lines, tubes and drains   Administer medications as ordered   Instruct and encourage patient and family to use good hand hygiene technique  2/5/2023 1508 by Sandrita Sommer  Outcome: Progressing     Problem: Pain  Goal: Verbalizes/displays adequate comfort level or baseline comfort level  2/5/2023 2223 by Kenyon Gagnon RN  Outcome: Progressing  Flowsheets (Taken 2/5/2023 2041)  Verbalizes/displays adequate comfort level or baseline comfort level:   Encourage patient to monitor pain and request assistance   Assess pain using appropriate pain scale   Administer analgesics based on type and severity of pain and evaluate response   Implement non-pharmacological measures as appropriate and evaluate response   Notify Licensed Independent Practitioner if interventions unsuccessful or patient reports new pain  2/5/2023 1508 by Sandrita Sommer  Outcome: Progressing     Problem: Chronic Conditions and Co-morbidities  Goal: Patient's chronic conditions and co-morbidity symptoms are monitored and maintained or improved  2/5/2023 2223 by Kenyon Gagnon RN  Outcome: Progressing  Flowsheets (Taken 2/5/2023 2040)  Care Plan - Patient's Chronic Conditions and Co-Morbidity Symptoms are Monitored and Maintained or Improved: Monitor and assess patient's chronic conditions and comorbid symptoms for stability, deterioration, or improvement  2/5/2023 1508 by Loretta Casarez  Outcome: Progressing

## 2023-02-06 NOTE — PROGRESS NOTES
[address] THERAPY MISSED TREATMENT NOTE  [facility] ICU STEPDOWN TELEMETRY 4K  [id number]      Date: 2023  Patient Name: Viviana Moon        CSN: [id number]   : 1950  (81 y.o.)  Gender: female   Referring Practitioner: WYATT Quiroz CNP  Diagnosis: acute dystolic heart failure         REASON FOR MISSED TREATMENT: Hold Treatment per Nursing patient has a hemoglobin of 5.1. Will attempt again next available day.

## 2023-02-06 NOTE — PROGRESS NOTES
Spoke with MARIA Leigh regarding referral for patient, hopeful to have patient admitted to IPR when medically stable, today patient is receiving transfusion for hemoglobin of 5.1 and nursing staff to work on facilitating a bowel movement.

## 2023-02-06 NOTE — PROGRESS NOTES
Hospitalist Progress Note    Patient:  Christina Loyd      Unit/Bed:4K-24/024-A    YOB: 1950    MRN: [id number]       Acct: [de-identified]     PCP: Freida Cox MD    Date of Admission: 2/1/2023    Assessment/Plan:    Acute right intertrochanteric femur fracture  -Ortho consult  -Pain management w/ PRN morphine 1mg Q6  -S/p R cephalomedullary nail by Dr. Mal[facility] 2/3/23  - Requested Rehab consult, for dispo issues. Supra therapeutic INR  - resolved  -Xarelto re started by Ortho today  - Vitamin K PO given pre op   -1 unit FFP ordered and consent obtained   - Daily INR's  Stable PE  -CTA confirming b/l non-obstructive PEs   -Will resume anticoagulation after surgery  Subclinical Hypothyroidism  -Consider synthroid treatment  B/L LE edema   -Elevation for edema   -No compression due to DVTs  CAD  -Continue ASA, BB, statins  Acute on chronic macrocytic anemia  - Currently stable but will monitor and transfuse if Hgb drops below 7.  -Hgb drop from 9-7.3. Will continue to monitor. IDDM2   -Continue lantus   -Continue medium dose SSI   -POC glucose checks              - due to elevated sugars increased the lantus and sliding                 Scale coverage. , previous A1c 8.8 and most recent  5.5  Elevated Troponin and denies any CP  -Stable since 1/21/23   Chronic Respiratory failure  -Pt has Hx of tracheostomy and tracheal tenosis  -O2 at baseline, continue O2 via trach mask. Obesity   -BMI 38.41   -Discussed lifestyle changes. Essential HTN  running on the lower side, cut down         BB to 12.5 toprol daily with parameters    13. Mild periods of confusion - did cut down the pain meds,         Trial of seroquel 25 mg BID, and check EKG  on Tuesday 14.  Dispo - plans discussed with spouse and therapist, consulted        PMR for further assistance , IPR vs NH . , encouraged her           To cooperate with the therapy team.              Expected discharge date:      Disposition:    [x] Home       [] TCU       [] Rehab       [] Psych       [] SNF       [] [place]       [] Other-    Chief Complaint: Lower extremity edema    Hospital Course:  Griselda Sabillon is a 73yoF with PMH of recent PE and b/l DVT, macrocytic anemia, CAD, DM2, tracheostomy dependent, obesity, and HTN who presents for leg swelling, nausea, and constipation. She was recently discharged after an 8 day admission for DVTs and PEs. She was placed on Xarelto when discharged and stated that she has been compliant and only missed one dose. She returned due to chronic constipation, feeling ''bloated'', nausea, and LE edema. While being evaluated in the ED the pt had a mechanical fall and developed a R femur fracture. She was found to have supratheraputic INR at 3.83. CT head and C-spine both clear. 2/2/23  -Her pain is being managed with morphine and her Xarelto is being held. She was also given Vitamin K to reduce her INR. -Urology was consulted to place a werner due to concerns of urinary retention. Resource RN, and multiple other nurses, was unable to successfully place werner. Will f/u with Urology. 2/3/23  -Pt had improvement in INR but it was still elevated over 2. FFP was ordered and consent was obtained after discussing risks and benefits with patient and her .     -Pt will go to surgery today after INR improves. 2/4/23  -Pt s/p cephalomedullary nail on R IT femur fracture by Dr. Philomena Soriano in Hgb, will continue to monitor and transfuse as needed. -Urology recommends to continue werner until pt is mobile, having good BMs and has improvement in swelling with completed diuresis.    -Pt given 1g Calcium gluc due to hypocalcemia and mild increase in K+. Suspecting K+ is secondary to hemolysis. 2/5/23    Pt tolerating diet , and offers no active new complaints, spouse at   Bed side, periods of confusion . Subjective (past 24 hours): Pt was well appearing and eating breakfast in bed.  She stated that her pain is better controlled today. , no N/V   Or abd pain , and attempting to work with therapy today . ROS (12 point review of systems completed. Pertinent positives noted. Otherwise ROS is negative). Medications:  Reviewed    Infusion Medications    dextrose      sodium chloride      sodium chloride       Scheduled Medications    polyethylene glycol  17 g Oral BID    lidocaine  2 patch TransDERmal Daily    ciprofloxacin  500 mg Oral 2 times per day    insulin lispro  5 Units SubCUTAneous TID WC    insulin glargine  28 Units SubCUTAneous Nightly    [START ON 2/6/2023] insulin glargine  13 Units SubCUTAneous Daily with breakfast    QUEtiapine  25 mg Oral BID    [START ON 2/6/2023] metoprolol succinate  12.5 mg Oral Daily    aspirin  81 mg Oral Daily    rivaroxaban  15 mg Oral BID WC    rosuvastatin  10 mg Oral Daily    senna  1 tablet Oral Nightly    sodium chloride flush  5-40 mL IntraVENous 2 times per day    insulin lispro  0-8 Units SubCUTAneous TID WC    insulin lispro  0-4 Units SubCUTAneous Nightly    furosemide  20 mg Oral Daily     PRN Meds: glucose, dextrose bolus **OR** dextrose bolus, glucagon (rDNA), dextrose, sodium chloride, albuterol sulfate HFA, Menthol, guaiFENesin, sodium chloride flush, sodium chloride, acetaminophen **OR** acetaminophen, potassium chloride **OR** potassium alternative oral replacement **OR** potassium chloride, magnesium sulfate, oxyCODONE **OR** [DISCONTINUED] oxyCODONE, hydrOXYzine      Intake/Output Summary (Last 24 hours) at 2/5/2023 1959  Last data filed at 2/5/2023 1421  Gross per 24 hour   Intake 756 ml   Output 675 ml   Net 81 ml       Diet:  ADULT DIET;  Regular; 4 carb choices (60 gm/meal)    Exam:  /60   Pulse 88   Temp 99 °F (37.2 °C) (Oral)   Resp 18   Ht 5' 2\" (1.575 m)   Wt 225 lb 11.2 oz (102.4 kg)   LMP  (LMP Unknown)   SpO2 99%   BMI 41.28 kg/m²     General appearance: No apparent distress, appears stated age and cooperative. , but obese   HEENT: Pupils equal, round, and reactive to light. Conjunctivae/corneas clear. Neck: Supple, with full range of motion. No jugular venous distention. Trachea midline. Respiratory:  Via tracheostomy, Normal respiratory effort. Clear to auscultation, bilaterally without Rales/Wheezes/Rhonchi. Cardiovascular: Regular rate and rhythm with normal  Abdomen: Soft, mild periumbilical tenderness, non-distended  Musculoskeletal: R hip tenderness, B/l +2 pitting pedal edema, s/p R hip surgery. Skin: Skin color, texture, turgor normal.  No rashes or lesions. Incision on R hip healing well, no obvious signs of infection. Neurologic:  Neurovascularly intact without any focal sensory/motor deficits. Cranial nerves: II-XII intact, grossly non-focal.  Psychiatric: Alert and oriented, thought content appropriate, normal insight  Capillary Refill: Brisk,< 3 seconds   Peripheral Pulses: +2 palpable, equal bilaterally       Labs:   Recent Labs     02/03/23 0418 02/04/23  0509   WBC 7.6 10.1   HGB 9.0* 7.3*   HCT 29.1* 24.0*    307     Recent Labs     02/03/23 0418 02/04/23  0509 02/05/23  0439    132* 131*   K 4.5 5.3* 4.9    98 97*   CO2 27 24 27   BUN 7 16 26*   CREATININE 0.7 1.0 0.8   CALCIUM 8.2* 8.0* 8.2*     No results for input(s): AST, ALT, BILIDIR, BILITOT, ALKPHOS in the last 72 hours. Recent Labs     02/03/23 0418 02/04/23  0509   INR 2.47* 1.72*     No results for input(s): CKTOTAL, TROPONINI in the last 72 hours.     Microbiology:      Urinalysis:      Lab Results   Component Value Date/Time    NITRU NEGATIVE 02/02/2023 05:10 PM    WBCUA 25-50 02/02/2023 02:10 PM    BACTERIA FEW 02/02/2023 02:10 PM    RBCUA 5-10 02/02/2023 02:10 PM    BLOODU NEGATIVE 02/02/2023 05:10 PM    SPECGRAV >1.030 02/02/2023 02:10 PM    GLUCOSEU NEGATIVE 02/02/2023 05:10 PM       Radiology:  XR FEMUR RIGHT (MIN 2 VIEWS)   Final Result   Intraoperative imaging demonstrating open reduction internal fixation right hip            **This report has been created using voice recognition software. It may contain minor errors which are inherent in voice recognition technology. **      Final report electronically signed by Dr. Eddie Dela Cruz on 2/3/2023 3:02 PM      FLUORO FOR SURGICAL PROCEDURES   Final Result      XR CHEST PORTABLE   Final Result   1. Marked cardiomegaly. Right arm PICC line, catheter tip at cavoatrial projection. . Tracheostomy tube in place. 2. No acute findings. No infiltrates or effusions are seen. **This report has been created using voice recognition software. It may contain minor errors which are inherent in voice recognition technology. **      Final report electronically signed by Dr. Jori Leroy on 2/2/2023 2:20 PM      XR ABDOMEN (KUB) (SINGLE AP VIEW)   Final Result   Impression:    Resolved constipation. Right femoral intertrochanteric fracture. This document has been electronically signed by: Geovanni Riley on    02/02/2023 05:35 AM      CT CERVICAL SPINE WO CONTRAST   Final Result    No fracture. **This report has been created using voice recognition software. It may contain minor errors which are inherent in voice recognition technology. **      Final report electronically signed by Dr. Stephanie Cuevas on 2/2/2023 7:38 AM      CT HEAD WO CONTRAST   Final Result    No evidence of an acute process. **This report has been created using voice recognition software. It may contain minor errors which are inherent in voice recognition technology. **      Final report electronically signed by Dr. Stephanie Cuevas on 2/2/2023 7:25 AM      XR HIP 2-3 VW W PELVIS RIGHT   Final Result   Impression:   Acute right femoral intertrochanteric fracture, with subtrochanteric    extension.       This document has been electronically signed by: Daryle Males, MD on    02/02/2023 05:01 AM      XR CHEST 1 VIEW   Final Result   Impression:   No acute cardiopulmonary disease. Cardiomegaly. This document has been electronically signed by: Sarah Ricks MD on    02/02/2023 05:02 AM      CTA CHEST W WO CONTRAST   Final Result   Impression:   1. Stable bilateral nonocclusive pulmonary emboli. Negative for CT    evidence of right heart strain or thrombus propagation. No new pulmonary    emboli since the prior study 1/21/23. 2. New trace bilateral pleural effusions. 3. Nonemergent/incidental findings as above. This document has been electronically signed by: Sarah Ricks MD on    02/02/2023 02:13 AM      All CTs at this facility use dose modulation techniques and iterative    reconstructions, and/or weight-based dosing   when appropriate to reduce radiation to a low as reasonably achievable. 3D Post-processing was performed on this study.       XR LUMBAR SPINE (2-3 VIEWS)    (Results Pending)       DVT prophylaxis: [] Lovenox                                 [] SCDs                                 [] SQ Heparin                                 [] Encourage ambulation           [x] Already on Anticoagulation     Code Status: Full Code    PT/OT Eval Status:     Tele:   [] yes             [x] no    Electronically signed by Joshua Perry MD on 2/5/2023 at 7:59 PM

## 2023-02-06 NOTE — PROGRESS NOTES
Cardiology Progress Note      Patient:  Keith Ayala  YOB: 1950  MRN: [id number]   Acct: [de-identified]  Admit Date:  2/1/2023  Primary Cardiologist: Isabel Sands MD  Seen by Dr. Sol Mock     Per prior cardiology consult note-  REASON FOR CONSULT:    Heart failure exacerbation, recently take off lasix      CHIEF COMPLAINT:    SOB     HISTORY OF PRESENT ILLNESS:    Keith Ayala is a pleasant 81year old female patient with past medical history that includes:   Past Medical History        Past Medical History:   Diagnosis Date    Arthritis      Coronary artery disease      COVID      Hyperlipidemia      Primary hypertension      Type 2 diabetes mellitus ([facility] [address] 2018      She has h/o COVID1-9 in 12/2021, respiratory failure, s/p tracheostomy. She has known h/o CAD, prior PCI. She seems to have h/o CHF (was previously on diuretics, recently discontinued). TTE on 12/2021 revealed an EF of 30-35%. A stress test on 06/2022 was negative for ischemia. Most recent Echocardiogram on 1/23/2023 revealed an EF of 60-65%, grade I DD, LAE. She was recently admitted to the hospital and was treated for DVT/PE, discharged home on Xarelto. Since her diuretics were stopped, she was noted to have increased leg swelling per her family at bedside. The patient was admitted to the hospital on 2/2/2023 after she presented with leg swelling, nausea, vomiting. She has chronic mild shortness of breath, no recent worsening. Per chart review, patient had a fall, right hip fracture was noted on Xray, Orthopedics consulted. Cardiology was consulted for acute on chronic HFpEF. Patient denies chest pain.      Subjective (Events in last 24 hours):   Pt in bed     Confused     Tele SR no ectopy   VSS    NAD    BP marginal low    Objective:   BP 98/62   Pulse 91   Temp 98.8 °F (37.1 °C) (Oral)   Resp 16   Ht 5' 2\" (1.575 m)   Wt 225 lb 6.4 oz (102.2 kg)   LMP  (LMP Unknown)   SpO2 97%   BMI 41.23 kg/m²        TELEMETRY: SR no ectopy Physical Exam:  General Appearance: alert and oriented to person, place and time, in no acute distress  Cardiovascular: normal rate, regular rhythm, normal S1 and S2, no murmurs, rubs, clicks, or gallops, distal pulses intact,   Pulmonary/Chest: clear to auscultation bilaterally- no wheezes, rales or rhonchi, normal air movement, no respiratory distress  Abdomen: soft, non-tender, non-distended, normal bowel sounds, no masses Extremities: no cyanosis, clubbing -- NP 1+ LE edema, pulses present    Skin: warm and dry, no rash or erythema  Musculoskeletal: normal range of motion, no joint swelling, deformity or tenderness  Neurological: alert, oriented, normal speech, no focal findings or movement disorder noted    Medications:    polyethylene glycol  17 g Oral BID    lidocaine  2 patch TransDERmal Daily    ciprofloxacin  500 mg Oral 2 times per day    insulin lispro  5 Units SubCUTAneous TID WC    insulin glargine  28 Units SubCUTAneous Nightly    insulin glargine  13 Units SubCUTAneous Daily with breakfast    QUEtiapine  25 mg Oral BID    metoprolol succinate  12.5 mg Oral Daily    aspirin  81 mg Oral Daily    rivaroxaban  15 mg Oral BID WC    rosuvastatin  10 mg Oral Daily    senna  1 tablet Oral Nightly    sodium chloride flush  5-40 mL IntraVENous 2 times per day    insulin lispro  0-8 Units SubCUTAneous TID WC    insulin lispro  0-4 Units SubCUTAneous Nightly    furosemide  20 mg Oral Daily      sodium chloride      dextrose      sodium chloride      sodium chloride       sodium chloride, , PRN  glucose, 4 tablet, PRN  dextrose bolus, 125 mL, PRN   Or  dextrose bolus, 250 mL, PRN  glucagon (rDNA), 1 mg, PRN  dextrose, , Continuous PRN  sodium chloride, , PRN  albuterol sulfate HFA, 2 puff, Q6H PRN  Menthol, 1 lozenge, Q3H PRN  guaiFENesin, 400 mg, BID PRN  sodium chloride flush, 5-40 mL, PRN  sodium chloride, , PRN  acetaminophen, 650 mg, Q6H PRN   Or  acetaminophen, 650 mg, Q6H PRN  potassium chloride, 40 mEq, PRN   Or  potassium alternative oral replacement, 40 mEq, PRN   Or  potassium chloride, 10 mEq, PRN  magnesium sulfate, 2,000 mg, PRN  oxyCODONE, 2.5 mg, Q4H PRN  hydrOXYzine, 25 mg, Q6H PRN        Diagnostics:    Echo:   Electronically signed by Tacho Melton MD (Interpreting   physician) on 01/23/2023 at 05:30 PM   ----------------------------------------------------------------      Findings      Mitral Valve   The mitral valve structure was normal with normal leaflet separation. DOPPLER: The transmitral velocity was within the normal range with no   evidence for mitral stenosis. There was no evidence of mitral   regurgitation. Aortic Valve   The aortic valve was trileaflet with normal thickness and cuspal   separation. DOPPLER: Transaortic velocity was within the normal range with   no evidence of aortic stenosis. There was no evidence of aortic   regurgitation. Tricuspid Valve   The tricuspid valve structure was normal with normal leaflet separation. DOPPLER: There was no evidence of tricuspid stenosis. Mild tricuspid   regurgitation visualized. Right ventricular systolic pressure measures 40   mmhg. Pulmonic Valve   The pulmonic valve leaflets exhibited normal thickness, no calcification,   and normal cuspal separation. DOPPLER: The transpulmonic velocity was   within the normal range with no evidence for regurgitation. Left Atrium   The left atrium is Mildly dilated. Left Ventricle   Normal left ventricle size and systolic function. Ejection fraction was   estimated at 60 to 65 %. There were no regional left ventricular wall   motion abnormalities and wall thickness was within normal limits. Doppler parameters were consistent with abnormal left ventricular   relaxation (grade 1 diastolic dysfunction). Right Atrium   Right atrial size was normal.      Right Ventricle   The right ventricular size was normal with normal systolic function and   wall thickness. Pericardial Effusion   The pericardium was normal in appearance with no evidence of a pericardial   effusion. Small Anterior and posterior echo free space. Pleural Effusion   No evidence of pleural effusion. Aorta / Great Vessels   -Aortic root dimension within normal limits.   -The Pulmonary artery is within normal limits. -IVC size is within normal limits with normal respiratory phasic changes. Stress:    Summary   Lexiscan EKG stress test is non-diagnostic for ischemia. Calculated gated LVEF 78 %. The T.I.D. ratio was 1.6 . Myocardial perfusion imaging is not suggestive for myocardial ischemia. This study was negative for ischemia. Recommendation   Clinical correlation is recommended. Aggressive risk factor management. Medical management. Signatures      ----------------------------------------------------------------   Electronically signed by Hamlet Ramos MD (Interpreting   Cardiologist) on 06/07/2022      Left Heart Cath:     Lab Data:    Cardiac Enzymes:  No results for input(s): CKTOTAL, CKMB, CKMBINDEX, TROPONINI in the last 72 hours.     CBC:   Lab Results   Component Value Date/Time    WBC 10.6 02/06/2023 05:40 AM    RBC 1.67 02/06/2023 05:40 AM    HGB 5.1 02/06/2023 05:40 AM    HCT 16.9 02/06/2023 05:40 AM     02/06/2023 05:40 AM       CMP:    Lab Results   Component Value Date/Time     02/06/2023 04:34 AM    K 4.8 02/06/2023 04:34 AM    K 4.1 01/28/2023 05:20 AM     02/06/2023 04:34 AM    CO2 27 02/06/2023 04:34 AM    BUN 22 02/06/2023 04:34 AM    CREATININE 0.7 02/06/2023 04:34 AM    LABGLOM >60 02/06/2023 04:34 AM    GLUCOSE 233 02/06/2023 04:34 AM    CALCIUM 8.2 02/06/2023 04:34 AM       Hepatic Function Panel:    Lab Results   Component Value Date/Time    ALKPHOS 95 02/01/2023 10:50 PM    ALT 12 02/01/2023 10:50 PM    AST 14 02/01/2023 10:50 PM    PROT 5.2 02/01/2023 10:50 PM    BILITOT 0.4 02/01/2023 10:50 PM    BILIDIR <0.2 02/01/2023 10:50 PM    LABALBU 3.1 02/01/2023 10:50 PM       Magnesium:    Lab Results   Component Value Date/Time    MG 1.9 02/05/2023 04:39 AM       PT/INR:    Lab Results   Component Value Date/Time    INR 1.72 02/04/2023 05:09 AM       HgBA1c:    Lab Results   Component Value Date/Time    LABA1C 5.5 01/24/2023 05:40 AM       FLP:  No results found for: TRIG, HDL, LDLCALC, LDLDIRECT, LABVLDL    TSH:    Lab Results   Component Value Date/Time    TSH 6.880 02/02/2023 08:10 AM         Assessment:    Sp Open treatment right intertrochanteric femur fracture the cephalomedullary nail 2/3/23    Acute on chronic diastolic chf --- resolved   - EF recovered    Chronic resp failure - on trach mask - secondary to Covid     Acute on chronic anemia - sp prbc    ?  CAD / ? PAD 2008    HTN-- marginal Low   HLP  DM II   a1c 5.5    Hx PE / DVT - on xarelto      Plan:  No active CHF noted - continue CDMP meds   Follow back with Dr. Carito Ruiz - has chf clinic appointment  Dominik Hurtado will sign off - please call for questions or concerns           Electronically signed by WYATT Hung - CNP on 2/6/2023 at 10:47 AM

## 2023-02-06 NOTE — PROGRESS NOTES
Physical Medicine & Rehabilitation Progress Note    Chief Complaint: Low back pain, right hip/groin and right thigh pain    Subjective:    Emilee Turner is a 81 y.o. 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 The patient previously was admitted to inpatient rehab service from 2022 to 2022 due to critical care myopathy and debility/physical decondition as result of COVID-19 pneumonia. She was discharged to home with referral for home care service on 2022. The patient developed progressive difficulty breathing in 2022 and was found to have glottic stenosis and eventually received tracheostomy by Dr. Trish Saldana on 2022. The patient's  says the patient also developed progressively worsening lower back pain in summer 2022 and saw orthopedics surgeon at [facility]. The patient has been says the patient was diagnosed of having \"lumbar spine stress fracture\". No surgical intervention was performed. She was treated with brace which she later abandoned due to discomfort associate with brace usage. The patient was recently hospitalized from 2023 to 2023 initially for abdominal bloating, nausea and leg swelling.   She was found to have elevated D-dimer. Subsequent testing revealed right lower lobe pulmonary embolism and right lower extremity DVT. She was at initially treated with IV heparin and later transition to [address] [place] [address]. The patient was brought to ER on 2/1/2022 because of progressively increased leg swelling, and nausea/vomiting associated with decreased oral intake. She was found to have BNP of 256.7 and Bumex was prescribed. She then had a fall accident  when she was going to toilet while she was in ER on 2/1/2022. The fall resulted severe right hip pain. X-ray of right hip and pelvis revealed acute right femoral intertrochanteric fracture with subtrochanteric extension. Orthopedic service was consulted. Xarelto was held in preparation for surgical management. Cardiology was consulted on 2/2/2023 for elevated BNP and Lasix was started. Because of difficulty voiding with urinary retention, urology was also consulted on 2/2/2023. Urology service placed Wen with some difficulty on 2/2/2023. Therefore Wen was kept in place. On 2/3/2023 the patient underwent open treatment of right intertrochanteric fracture with cephalomedullary nail by Dr. Ani Brown. She is allowed weightbearing as tolerated at the right lower extremity postoperatively. The patient's  stated yesterday that the patient began having progressively worsened low back pain starting in summer 2022 began interfering the patient's daily function. Therefore she is sore orthopedic physician at [facility]. Imaging tests were done and the patient was told that she had \" stress fracture\" in her spine. She was provided with a lumbar spine brace to wear but she was not compliant with spine brace application because of discomfort while wearing it.   Because no outside lumbar spine images study report was available for review, x-ray of lumbar spine was ordered and performed this morning and shows moderate vertebral body spondylosis in the lumbar and lower thoracic spine, moderate degenerative facet arthropathy involving at least lower 3 lumbar levels, lumbar spine osteoporosis, mild superior endplate depression fracture at L2, L3 and L4. Lumbar spine MRI was recommended by radiologist.  Therefore primary team ordered lumbar spine MRI which was performed this afternoon revealing acute fracture with a fluid cleft associated with endplate at Z4-P1 levels with mild height loss at each vertebral body. This morning the patient also was found to have significant drop of hemoglobin/hematocrit to 5.1/16. 9. Therefore she was given 1 unit of packed red blood cell transfusion. The patient has been says the patient has been acting strange today with confusion and agitation. The patient pointing at her  and stated \"he laughed at me and my \". The patient also points at her  & daughter stating the they preventing her from \"attending \". The patient only received 1 dose of oxycodone 2.5 mg this morning. The patient still complains of pain to her low back, right hip/groin and right thigh. Rehabilitation:  PT: Reviewed.    (2023) : Timed Code Treatment Minutes: 18 Minutes  Activity Tolerance:  Patient tolerance of  treatment: fair. Decreased tolerance due to increased pain, overall weakness and decreased endurance with quick fatigue. Balance:  Static Sitting Balance:  Contact Guard Assistance, Moderate Assistance, X 1, with cues for safety, with verbal cues   Initially required mod A, once more stable required CGA for safety. Pt tolerated about 7 min on EOB. Bed Mobility:  Rolling to Right: Moderate Assistance, X 1, with rail, with verbal cues    Supine to Sit: Moderate Assistance, X 2, with head of bed raised, with rail, with verbal cues , with increased time for completion  Sit to Supine:  Moderate Assistance, X 2   Scooting: Dependent with hercules sheet, required max A to scoot towards EOB for foot placement  Assist for  B LE to place on EOB  and back into bed for support, cues for technique with fair demo. Pt completed rolling to place pillow at end of session for comfort and support. Transfers:  Not Tested  Declined at this time due to increased pain     Ambulation:  Not Tested      OT: Reviewed.    (2/6/2023) : Patient not seen  REASON FOR MISSED TREATMENT: Hold Treatment per Nursing patient has a hemoglobin of 5.1. Will attempt again next available day.      (2/5/2023) : Timed Code Treatment Minutes: 14 Minutes  Activity Tolerance:  Patient tolerance of  treatment: fair. ADL:   Footwear Management: Dependent. For don/doffing slipper socks . BALANCE:  Sitting Balance:  Contact Guard Assistance. Vcs for posture, leaning to L side to minimize WB on R hip  Standing Balance: Moderate Assistance. X  2; only able to achieve 1/2 stand     BED MOBILITY:  Rolling to Left: Maximum Assistance, X 1    Rolling to Right: Maximum Assistance, X 1    Supine to Sit: Maximum Assistance, X 1    Sit to Supine: Maximum Assistance, X 2    Later scoots toward [facility] with max A & max vcs for technique to A  **Pt was quick to ask  to A; Pt having  pull on her UE-therapist cautioned against this-for prevention of shoulder injuries     TRANSFERS:  Sit to Stand: Moderate Assistance, X 2.    Stand to Sit: Moderate Assistance, X 2.    **max vcs for posture, Pt fearful of fall & c/o significant pain in RLE  **Pt tried standing 2x     FUNCTIONAL MOBILITY:  Not ready to attempt yet. ST: Reviewed.    (2/6/2023) :  DIAGNOSTIC IMPRESSIONS: The patient presents with at least moderate cognitive-linguistic impairments characterized by deficits in verbal problem solving, reasoning, thought organization, decision making, attention, and immediate and delayed recall. Confusion also present, limiting ability to participate in more structured, lengthy cognitive assessment.  Moderate dysphonia (hoarseness, strain) also present, however patient at baseline level of vocal quality functioning due to known tracheal stenosis. Patient is current with ENT practice and is being followed this admission. No dysarthria, dysphagia, or specific expressive or receptive language impairments. Recommending skilled cognitive-linguistic treatment to address areas of impairment noted above to improve safety and decision making within the current and future living environments.       Current Medications:   Current Facility-Administered Medications   Medication Dose Route Frequency Provider Last Rate Last Admin    0.9 % sodium chloride infusion   IntraVENous PRN Myranda Mann PA-C        [START ON 2/7/2023] metoprolol succinate (TOPROL XL) extended release tablet 25 mg  25 mg Oral Daily WYATT Arango - ARIELLE        polyethylene glycol (GLYCOLAX) packet 17 g  17 g Oral BID Armaan Manriquez MD   17 g at 02/06/23 0958    lidocaine 4 % external patch 2 patch  2 patch TransDERmal Daily Armaan Manriquez MD   2 patch at 02/06/23 1211    ciprofloxacin (CIPRO) tablet 500 mg  500 mg Oral 2 times per day Scar Garrett MD   500 mg at 02/06/23 0907    insulin lispro (HUMALOG) injection vial 5 Units  5 Units SubCUTAneous TID WC Armaan Manriquez MD   5 Units at 02/06/23 1210    insulin glargine (LANTUS) injection vial 28 Units  28 Units SubCUTAneous Nightly Armaan Manriquez MD   28 Units at 02/05/23 2134    insulin glargine (LANTUS) injection vial 13 Units  13 Units SubCUTAneous Daily with breakfast Armaan Manriquez MD   13 Units at 02/06/23 0912    QUEtiapine (SEROQUEL) tablet 25 mg  25 mg Oral BID Armaan Manriquez MD   25 mg at 02/06/23 0908    glucose chewable tablet 16 g  4 tablet Oral PRN Radha Garcia MD        dextrose bolus 10% 125 mL  125 mL IntraVENous PRN Piedad Cazares MD        Or    dextrose bolus 10% 250 mL  250 mL IntraVENous PRN Piedad Cazares MD        glucagon (rDNA) injection 1 mg  1 mg SubCUTAneous PRN Radha Garcia MD        dextrose 10 % infusion   IntraVENous Continuous PRN Bebeto Joyner MD        0.9 % sodium chloride infusion   IntraVENous PRN WYATT Clifton CNP        aspirin chewable tablet 81 mg  81 mg Oral Daily WYATT Macias CNP   81 mg at 02/06/23 1345    rivaroxaban (XARELTO) tablet 15 mg  15 mg Oral BID WC WYATT Macias CNP   15 mg at 02/06/23 0908    albuterol sulfate HFA (PROVENTIL;VENTOLIN;PROAIR) 108 (90 Base) MCG/ACT inhaler 2 puff  2 puff Inhalation Q6H PRN WYATT Macias CNP        Menthol lozenge 4.8 mg  1 lozenge Oral Q3H PRN WYATT Macias CNP        guaiFENesin (ROBITUSSIN) 100 MG/5ML liquid 400 mg  400 mg Oral BID PRN WYATT Clifton CNP   400 mg at 02/04/23 2352    rosuvastatin (CRESTOR) tablet 10 mg  10 mg Oral Daily WYATT Macias CNP   10 mg at 02/06/23 0908    senna (SENOKOT) tablet 8.6 mg  1 tablet Oral Nightly WYATT Macias CNP   8.6 mg at 02/05/23 2044    sodium chloride flush 0.9 % injection 5-40 mL  5-40 mL IntraVENous 2 times per day WYATT Clifton CNP   10 mL at 02/05/23 2045    sodium chloride flush 0.9 % injection 5-40 mL  5-40 mL IntraVENous PRN WYATT Macias CNP        0.9 % sodium chloride infusion   IntraVENous PRN WYATT Macias CNP        acetaminophen (TYLENOL) tablet 650 mg  650 mg Oral Q6H PRN WYATT Macias CNP        Or    acetaminophen (TYLENOL) suppository 650 mg  650 mg Rectal Q6H PRN WYATT Macias CNP        potassium chloride (KLOR-CON M) extended release tablet 40 mEq  40 mEq Oral PRN WYATT Macias CNP        Or    potassium bicarb-citric acid (EFFER-K) effervescent tablet 40 mEq  40 mEq Oral PRN WYATT Macias CNP        Or    potassium chloride 10 mEq/100 mL IVPB (Peripheral Line)  10 mEq IntraVENous PRN WYATT Macias CNP        magnesium sulfate 2000 mg in 50 mL IVPB premix  2,000 mg IntraVENous PRN WYATT Macias CNP        insulin lispro (HUMALOG) injection vial 0-8 Units  0-8 Units SubCUTAneous TID  Armaan Manriquez MD   2 Units at 02/06/23 1210    insulin lispro (HUMALOG) injection vial 0-4 Units  0-4 Units SubCUTAneous Nightly WYATT Macias CNP        oxyCODONE (ROXICODONE) immediate release tablet 2.5 mg  2.5 mg Oral Q4H PRN WYATT Reynolds CNP   2.5 mg at 02/06/23 4869    hydrOXYzine (VISTARIL) injection 25 mg  25 mg IntraMUSCular Q6H PRN WYATT Reynolds CNP   25 mg at 02/02/23 2045    furosemide (LASIX) tablet 20 mg  20 mg Oral Daily WYATT Macias CNP   20 mg at 02/06/23 4098        Review of Systems:  Review of Systems   Constitutional:  Positive for fatigue. Negative for chills, diaphoresis and fever. HENT:  Negative for hearing loss, rhinorrhea, sneezing, sore throat and trouble swallowing. Eyes:  Negative for visual disturbance. Respiratory:  Negative for cough, shortness of breath and wheezing. Cardiovascular:  Positive for leg swelling (Bilateral legs). Gastrointestinal:  Negative for abdominal pain, constipation, diarrhea, nausea and vomiting. Genitourinary:  Negative for dysuria. Musculoskeletal:  Positive for arthralgias (Right hip and right groin), back pain, gait problem and myalgias (Right thigh). Negative for neck pain. Skin:  Negative for rash. Neurological:  Positive for weakness (Right lower extremity). Negative for dizziness, tremors, speech difficulty, light-headedness and headaches. Psychiatric/Behavioral:  Positive for agitation, confusion and dysphoric mood. Negative for hallucinations and sleep disturbance. The patient is not nervous/anxious.          Objective:  /60   Pulse 88   Temp 98.2 °F (36.8 °C)   Resp 20   Ht 5' 2\" (1.575 m)   Wt 225 lb 6.4 oz (102.2 kg)   LMP  (LMP Unknown)   SpO2 98%   BMI 41.23 kg/m²   Physical Exam   General:  well-developed, well nourished morbid obese  female ; in no acute distress ; appropriate affect but somewhat irritated mood; lying on bed comfortably; receiving oxygen supplement via trach mask  Eyes: pupil equally round ; extra-ocular motion intact bilaterally  Head, Ear, Nose, Mouth & Throat : normocephalic ; no discharge from ears or nose ; no deformity ; no facial swelling ; oral mucosa pink  Neck :  supple ; presence of tracheostomy at anterior neck; no tenderness or muscle spasm at cervical paraspinal muscle and upper trapezius muscle  Cardiovascular : regular rate & rhythm ; normal S1 & S2 heart sound ; no murmur ; normal peripheral pulse at bilateral upper & lower extremities  Pulmonary : Breath sounds present at bilateral lung fields; no wheezing ; no rale; no crackle; tenderness at the left anterior chest wall over the pectoral muscles  Gastrointestinal : soft, protruded abdomen without tenderness ; normal bowel sound present    : Wen catheter in place draining light yellowish urine  Back : Not assessed due to inability to turn to the side secondary to pain  Skin: no skin lesion or rash ; presence of adhesive dressing at right lateral upper and lower thigh; no pitting edema at bilateral upper extremities; 2+ pitting edema at bilateral legs and feet more on the right side than the left side; right leg slightly more swelling than the left; presence of PICC line on right arm near elbow area  Musculoskeletal : no limb asymmetry; no limb deformity; tenderness at right lateral thigh and right groin area; no tenderness at bilateral upper extremities & the rest of bilateral lower extremities; no palpable mass at limbs ; right hip and right knee joint laxity not assessed; no joints laxity or crepitation at other limb joints; shoulder flexion and abduction passive ROM reaching 160 degrees bilaterally; right hip flexion passive ROM reaching 30 degrees and right knee flexion passive ROM reaching 30 degrees limited by pain at right hip and thigh; left hip flexion passive ROM reaching 90 degrees; ankle dorsiflexion passive ROM reaching 0 degrees bilaterally; otherwise normal functional joints ROM at the rest of bilateral upper & lower extremities  Cerebral :  alert ; awake ; oriented to place, person and time; follow 1-2 steps verbal command  Sensory : intact light touch and pin prick sensation at bilateral upper extremities; reduced light touch and pinprick sensation at distal bilateral lower extremities from upper leg region downward in sock distribution  Motor : normal tone at bilateral upper & left lower extremities ; muscle tone not assessed on right lower extremity due to the pain; 2-/5 muscle strength at right hip flexion, abduction and adduction; 3-/5 muscle strength at right knee extension ; 2+/5 muscle strain at the right knee flexion; 3-/5 muscle strength at the left hip flexion; 4+/5 muscle strength at the left knee flexion and extension; otherwise 5/5 muscle strength at the rest of bilateral upper and the lower extremities  Reflex : 1+ bilateral brachioradialis reflexes; 0 bilateral biceps and bilateral knees reflexes   Pathological Reflex :  No Roberta's sign ; no ankle clonus  Gait : Not assessed      Diagnostics:   Recent Results (from the past 24 hour(s))   POCT glucose    Collection Time: 02/05/23  8:43 PM   Result Value Ref Range    POC Glucose 232 (H) 70 - 108 mg/dl   Basic Metabolic Panel    Collection Time: 02/06/23  4:34 AM   Result Value Ref Range    Sodium 136 135 - 145 meq/L    Potassium 4.8 3.5 - 5.2 meq/L    Chloride 101 98 - 111 meq/L    CO2 27 23 - 33 meq/L    Glucose 233 (H) 70 - 108 mg/dL    BUN 22 7 - 22 mg/dL    Creatinine 0.7 0.4 - 1.2 mg/dL    Calcium 8.2 (L) 8.5 - 10.5 mg/dL   Anion Gap    Collection Time: 02/06/23  4:34 AM   Result Value Ref Range    Anion Gap 8.0 8.0 - 16.0 meq/L   Glomerular Filtration Rate, Estimated    Collection Time: 02/06/23  4:34 AM   Result Value Ref Range    Est, Glom Filt Rate >60 >60 ml/min/1.73m2   SPECIMEN REJECTION    Collection Time: 02/06/23  5:14 AM   Result Value Ref Range    Rejected Test cbcwd     Reason for Rejection see below    CBC with Auto Differential    Collection Time: 02/06/23  5:40 AM   Result Value Ref Range    WBC 10.6 4.8 - 10.8 thou/mm3    RBC 1.67 (L) 4.20 - 5.40 mill/mm3    Hemoglobin 5.1 (LL) 12.0 - 16.0 gm/dl    Hematocrit 16.9 (LL) 37.0 - 47.0 %    .2 (H) 81.0 - 99.0 fL    MCH 30.5 26.0 - 33.0 pg    MCHC 30.2 (L) 32.2 - 35.5 gm/dl    RDW-CV 15.9 (H) 11.5 - 14.5 %    RDW-SD 56.5 (H) 35.0 - 45.0 fL    Platelets 367 516 - 177 thou/mm3    MPV 9.5 9.4 - 12.4 fL    Pathologist Review ALP     Differential Type see below     Seg Neutrophils 66.4 %    Lymphocytes 17.8 %    Monocytes 11.0 %    Eosinophils 0.3 %    Basophils 0.2 %    Immature Granulocytes 4.3 %    Platelet Estimate ADEQUATE Adequate    Segs Absolute 7.0 1.8 - 7.7 thou/mm3    Lymphocytes Absolute 1.9 1.0 - 4.8 thou/mm3    Monocytes Absolute 1.2 0.4 - 1.3 thou/mm3    Eosinophils Absolute 0.0 0.0 - 0.4 thou/mm3    Basophils Absolute 0.0 0.0 - 0.1 thou/mm3    Immature Grans (Abs) 0.46 (H) 0.00 - 0.07 thou/mm3    nRBC 2 /100 wbc    BASOPHILIA 1+ Absent    Stomatocytes 1+ Absent   Scan of Blood Smear    Collection Time: 02/06/23  5:40 AM   Result Value Ref Range    SCAN OF BLOOD SMEAR see below    TYPE AND SCREEN    Collection Time: 02/06/23  6:50 AM   Result Value Ref Range    ABO AB     Rh Factor POS     Antibody Screen NEG    POCT glucose    Collection Time: 02/06/23  7:37 AM   Result Value Ref Range    POC Glucose 246 (H) 70 - 108 mg/dl   POCT glucose    Collection Time: 02/06/23 11:02 AM   Result Value Ref Range    POC Glucose 236 (H) 70 - 108 mg/dl   Lactate Dehydrogenase    Collection Time: 02/06/23  1:30 PM   Result Value Ref Range     (H) 100 - 190 U/L   Bilirubin, Total    Collection Time: 02/06/23  1:30 PM   Result Value Ref Range    Total Bilirubin 0.5 0.3 - 1.2 mg/dL   Vitamin B12 & Folate    Collection Time: 02/06/23  1:30 PM   Result Value Ref Range    Vitamin B-12 969 (H) 211 - 911 pg/mL    Folate 4.7 (L) 4.8 - 24.2 ng/mL   POCT glucose    Collection Time: 02/06/23  3:55 PM   Result Value Ref Range    POC Glucose 153 (H) 70 - 108 mg/dl        Latest Reference Range & Units 2/3/23 04:18 2/4/23 05:09 2/6/23 05:40   WBC 4.8 - 10.8 thou/mm3 7.6 10.1 10.6   RBC 4.20 - 5.40 mill/mm3 2.85 (L) 2.37 (L) 1.67 (L)   Hemoglobin Quant 12.0 - 16.0 gm/dl 9.0 (L) 7.3 (L) 5.1 (LL)   Hematocrit 37.0 - 47.0 % 29.1 (L) 24.0 (L) 16.9 (LL)   MCV 81.0 - 99.0 fL 102.1 (H) 101.3 (H) 101.2 (H)   MCH 26.0 - 33.0 pg 31.6 30.8 30.5   MCHC 32.2 - 35.5 gm/dl 30.9 (L) 30.4 (L) 30.2 (L)   MPV 9.4 - 12.4 fL 9.2 (L) 9.6 9.5   RDW-CV 11.5 - 14.5 % 15.7 (H) 15.4 (H) 15.9 (H)   RDW-SD 35.0 - 45.0 fL 58.0 (H) 56.5 (H) 56.5 (H)   Platelet Count 051 - 400 thou/mm3 289 307 248   Platelet Estimate Adequate    ADEQUATE   BASOPHILIA Absent    1+   Lymphocytes Absolute 1.0 - 4.8 thou/mm3 1.4 0.7 (L) 1.9   Monocytes Absolute 0.4 - 1.3 thou/mm3 0.7 0.5 1.2   Eosinophils Absolute 0.0 - 0.4 thou/mm3 0.1 0.0 0.0   Basophils Absolute 0.0 - 0.1 thou/mm3 0.1 0.0 0.0   Differential Type    see below   Seg Neutrophils % 67.5 85.8 66.4   Segs Absolute 1.8 - 7.7 thou/mm3 5.1 8.7 (H) 7.0   Lymphocytes % 18.7 6.5 17.8   Monocytes % 9.7 4.9 11.0   Eosinophils % 0.9 0.0 0.3   Basophils % 0.7 0.3 0.2   Immature Grans (Abs) 0.00 - 0.07 thou/mm3 0.19 (H) 0.25 (H) 0.46 (H)   Immature Granulocytes % 2.5 2.5 4.3   Nucleated Red Blood Cells /100 wbc 1 1 2   Stomatocytes Absent    1+   (LL): Data is critically low  (L): Data is abnormally low  (H): Data is abnormally high      Respiratory sputum culture (2/2/2023) :  Component 2/2/23 2753    Gram Stain Result Moderate segmented neutrophils observed. Rare epithelial cells observed. Moderate gram negative bacilli.    Organism Pseudomonas aeruginosa Abnormal     Respiratory Culture heavy growth    Organism Stenotrophomonas maltophilia Abnormal     Respiratory Culture light growth This organism is frequently present as a colonizer (eg. resp. tract); virulence is low, except in immunosuppressed pts. There is an increased incidence of colonization with the use of broad spectrum antibiotics, especially imipenem. Clinical significance of susceptibility testing is unknown, therefore empiric therapy is recommended. Often two drugs are used empirically; trimethoprim/sulfamethoxazole plus timentin. If isolate is suspected of being a pathogen (isolated from normally sterile site or in immunocompromised host), then Microbiology consultation is suggested. Susceptibility  Pseudomonas aeruginosa (1)  Antibiotic Interpretation Microscan   Method Status     cefepime Sensitive <=1 mcg/mL BACTERIAL SUSCEPTIBILITY PANEL BY DOMO Preliminary     piperacillin-tazobactam Sensitive <=4 mcg/mL BACTERIAL SUSCEPTIBILITY PANEL BY DOMO Preliminary     gentamicin Sensitive <=1 mcg/mL BACTERIAL SUSCEPTIBILITY PANEL BY DOMO Preliminary     ciprofloxacin Sensitive <=0.25 mcg/mL BACTERIAL SUSCEPTIBILITY PANEL BY DOMO Preliminary     tobramycin Sensitive <=1 mcg/mL BACTERIAL SUSCEPTIBILITY PANEL BY DOMO Preliminary           Latest Reference Range & Units 2/6/23 13:30   FOLATE, FOLAT 4.8 - 24.2 ng/mL 4.7 (L)   Vitamin B-12 211 - 911 pg/mL 969 (H)   (L): Data is abnormally low  (H): Data is abnormally high      X-ray lumbar spine (2/6/2023) : Impression   1. Slight thoracolumbar levoscoliosis. Cannot exclude muscle spasm right side. 2. Moderate vertebral body spondylosis scattered in the lumbar and lower thoracic spine. Moderate degenerative facet arthropathy involving at least the lower 3 lumbar levels. 3. Diffuse demineralization of the bones, consistent with osteoporosis. Mild superior endplate depression fractures L2, L3, and L4 of questionable acuity but none of these were present on the prior CT abdomen dated 1/10/2022.  These were all present, in retrospect, on and abdominal film dated 1/25/2023. 4. MRI lumbar spine would be helpful for further evaluation. Patient may be a good candidate for vertebroplasty. If so, this can be arranged through the interventional scheduling desk of [facility] radiology department (extension [phone]). MRI of the lumbar spine without contrast (2/6/2023) : Impression   1. Acute fractures with fluid clefts associated with the endplates at the Z5-J2 levels. The height loss of each vertebral body is mild. These are most likely pathologic secondary to underlying osteoporosis. 2. 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 Impression:  Right femoral intertrochanteric fracture with subtrochanteric extension due to fall requiring open reduction and internal fixation with cephalomedullary nail  Glottic stenosis requiring tracheostomy  Persistent chronic low back pain due to lumbar and lower thoracic spine spondylosis with degenerative facet arthropathy at the lower 3 lumbar levels, and acute L2-S1 endplate fractures  Acute anemia requiring blood transfusion  Lumbar spine osteoporosis  Diabetes mellitus type 2 with poor blood glucose control and complicated by bilateral lower extremities diabetic polyneuropathy  Morbid obesity  Hypertension  Hyperlipidemia  History of lower back pain with history of \"lumbar stress fracture\"  History of COVID infection with pneumonia  History of mild cognitive impairment  History of coronary artery disease requiring coronary artery stenting  Grade 1 left ventricular diastolic dysfunction with preserved ejection fraction    Today the patient has significant drop in hemoglobin and hematocrit requiring blood transfusion. She also exhibits some agitation with delusions. Because of medical instability, today's rehab therapy was held.   The patient's lumbar spine x-ray and later lumbar spine MRI study reveal multiple levels acute mild compression fractures, and lumbar spondylosis with lower lumbar spine facet arthropathy. I think the patient should wear the lumbar spine orthosis that was previously prescribed for her by [facility] orthopedic physician. We will continue to monitor the patient's condition and her response to further rehab intervention in order to provide further rehab recommendation      Plan:  Continue ongoing PT, OT and speech therapy while the patient remains in acute hospital  Recommend the patient to use the lumbar spine orthosis previously given to her when she is out of bed  We will continue monitor the patient's medical condition and her response to further rehab intervention in order provide further rehab recommendation.       Socrates Smith MD

## 2023-02-06 NOTE — PROGRESS NOTES
Hospitalist Progress Note    Patient:  Jan Blas      Unit/Bed:4K-24/024-A    YOB: 1950    MRN: [id number]       Acct: [de-identified]     PCP: Christina Mei MD    Date of Admission: 2/1/2023    Assessment/Plan:    Acute right intertrochanteric femur fracture  -Ortho consult  -Pain management w/ PRN morphine 1mg Q6  -S/p R cephalomedullary nail by Dr. Jo Ann Trammell 2/3/23  - Requested Rehab consult, for dispo issues. Supra therapeutic INR  - resolved  -Xarelto re started by Ortho today  - Vitamin K PO given pre op   -1 unit FFP ordered and consent obtained   - Daily INR's  Stable PE  -CTA confirming b/l non-obstructive PEs   -Will resume anticoagulation after surgery  Subclinical Hypothyroidism  -Consider synthroid treatment  B/L LE edema   -Elevation for edema   -No compression due to DVTs  CAD  -Continue ASA, BB, statins  Acute on chronic macrocytic anemia  - Currently stable but will monitor and transfuse if Hgb drops below 7.  -Hgb drop from 9-7.3. Will continue to monitor.  -Hgb drop to 5.1. Transfusion ordered PRCx2  -H&H ordered to verify accuracy of prior results. -Bilirubin, Haptoglobin, LDH, B12 & Folate ordered to assess for hemolytic etiology. IDDM2   -Continue lantus   -Continue medium dose SSI   -POC glucose checks  -Due to elevated sugars increased the lantus and sliding   -Scale coverage. , previous A1c 8.8 and most recent  5.5  Elevated Troponin and denies any CP  -Stable since 1/21/23   Chronic Respiratory failure  -Pt has Hx of tracheostomy and tracheal tenosis  -O2 at baseline, continue O2 via trach mask. Obesity   -BMI 38.41   -Discussed lifestyle changes. Essential HTN    -Running on the lower side, cut down BB to 12.5 toprol daily with parameters  13. Mild periods of confusion   -Did cut down the pain meds   -Trial of seroquel 25 mg BID, and check EKG  on Tuesday   14.  Dispo   -plans discussed with spouse and therapist, consulted PMR for further assistance, IPR vs NH, encouraged her to cooperate with the therapy team.   15. Depression fractures of L2, L3, L4.  -XR showing compression fractures with questionable acuity. -MRI ordered to assess acuity  16. Constipation  -Pt stated that she has not had a BM since arrival.  -On Senokot, Glycolax  -Pt denies nausea, has no vomiting, and has been eating normally. Expected discharge date:      Disposition:    [x] Home       [] TCU       [] Rehab       [] Psych       [] SNF       [] [place]       [] Other-    Chief Complaint: Lower extremity edema    Hospital Course:  Paula Sunshine is a 73yoF with PMH of recent PE and b/l DVT, macrocytic anemia, CAD, DM2, tracheostomy dependent, obesity, and HTN who presents for leg swelling, nausea, and constipation. She was recently discharged after an 8 day admission for DVTs and PEs. She was placed on Xarelto when discharged and stated that she has been compliant and only missed one dose. She returned due to chronic constipation, feeling ''bloated'', nausea, and LE edema. While being evaluated in the ED the pt had a mechanical fall and developed a R femur fracture. She was found to have supratheraputic INR at 3.83. CT head and C-spine both clear. 2/2/23  -Her pain is being managed with morphine and her Xarelto is being held. She was also given Vitamin K to reduce her INR. -Urology was consulted to place a werner due to concerns of urinary retention. Resource RN, and multiple other nurses, was unable to successfully place werner. Will f/u with Urology. 2/3/23  -Pt had improvement in INR but it was still elevated over 2. FFP was ordered and consent was obtained after discussing risks and benefits with patient and her .     -Pt will go to surgery today after INR improves. 2/4/23  -Pt s/p cephalomedullary nail on R IT femur fracture by Dr. Easton Cheng in b, will continue to monitor and transfuse as needed.   -Urology recommends to continue werner until pt is mobile, having good BMs and has improvement in swelling with completed diuresis.    -Pt given 1g Calcium gluc due to hypocalcemia and mild increase in K+. Suspecting K+ is secondary to hemolysis. 23    Pt tolerating diet , and offers no active new complaints, spouse at   Bed side, periods of confusion . 23  Pt's hemoglobin was found to be 5.1. Pt had no new complaints during assessment. While MM were pale pt had no complaints of chest pain or sob. No signs of hemorrhage were found on exam. Work-up for concerns of hemolytic cause ordered. Transfusion indicated and another H&H was ordered to verify accuracy of results. Will continue Xarelto due to presence of PEs and DVTs. Otherwise, pt's pain from R hip surgery was controlled. -PMR consulted who ordered lumbar XR showin. Slight thoracolumbar levoscoliosis. Cannot exclude muscle spasm right side. 2. Moderate vertebral body spondylosis scattered in the lumbar and lower thoracic spine. Moderate degenerative facet arthropathy involving at least the lower 3 lumbar levels. 3. Diffuse demineralization of the bones, consistent with osteoporosis. Mild superior endplate depression fractures L2, L3, and L4 of questionable acuity but none of these were present on the prior CT abdomen dated 1/10/2022. These were all present, in    retrospect, on and abdominal film dated 2023. 4. MRI lumbar spine would be helpful for further evaluation. Patient may be a good candidate for vertebroplasty. If so, this can be arranged through the interventional scheduling desk of [facility] radiology department (extension [phone]). Pt did admit to Hx off spinal injury therefore suspect injuries are not acute. Will continue to follow with PMR and MRI results. Subjective (past 24 hours): Pt was well appearing and eating breakfast in bed. She stated that her pain is better controlled today. , no N/V   Or abd pain , and attempting to work with therapy today . ROS (12 point review of systems completed. Pertinent positives noted. Otherwise ROS is negative). Medications:  Reviewed    Infusion Medications    sodium chloride      dextrose      sodium chloride      sodium chloride       Scheduled Medications    [START ON 2/7/2023] metoprolol succinate  25 mg Oral Daily    polyethylene glycol  17 g Oral BID    lidocaine  2 patch TransDERmal Daily    ciprofloxacin  500 mg Oral 2 times per day    insulin lispro  5 Units SubCUTAneous TID WC    insulin glargine  28 Units SubCUTAneous Nightly    insulin glargine  13 Units SubCUTAneous Daily with breakfast    QUEtiapine  25 mg Oral BID    aspirin  81 mg Oral Daily    rivaroxaban  15 mg Oral BID WC    rosuvastatin  10 mg Oral Daily    senna  1 tablet Oral Nightly    sodium chloride flush  5-40 mL IntraVENous 2 times per day    insulin lispro  0-8 Units SubCUTAneous TID WC    insulin lispro  0-4 Units SubCUTAneous Nightly    furosemide  20 mg Oral Daily     PRN Meds: sodium chloride, glucose, dextrose bolus **OR** dextrose bolus, glucagon (rDNA), dextrose, sodium chloride, albuterol sulfate HFA, Menthol, guaiFENesin, sodium chloride flush, sodium chloride, acetaminophen **OR** acetaminophen, potassium chloride **OR** potassium alternative oral replacement **OR** potassium chloride, magnesium sulfate, oxyCODONE **OR** [DISCONTINUED] oxyCODONE, hydrOXYzine      Intake/Output Summary (Last 24 hours) at 2/6/2023 1405  Last data filed at 2/6/2023 6023  Gross per 24 hour   Intake 300 ml   Output 700 ml   Net -400 ml     Diet:  ADULT DIET; Regular; 4 carb choices (60 gm/meal)    Exam:  /66   Pulse 88   Temp 98.6 °F (37 °C) (Oral)   Resp 20   Ht 5' 2\" (1.575 m)   Wt 225 lb 6.4 oz (102.2 kg)   LMP  (LMP Unknown)   SpO2 100%   BMI 41.23 kg/m²     General appearance: No apparent distress, appears stated age and cooperative. , but obese   HEENT: Pupils equal, round, and reactive to light. Conjunctivae/corneas clear.   Neck: Supple, with full range of motion. No jugular venous distention. Trachea midline. Respiratory:  Via tracheostomy, Normal respiratory effort. Clear to auscultation, bilaterally without Rales/Wheezes/Rhonchi. Cardiovascular: Regular rate and rhythm with normal  Abdomen: Soft, mild periumbilical tenderness, non-distended  Musculoskeletal: R hip tenderness, B/l +2 pitting pedal edema, s/p R hip surgery. Skin: Skin color, texture, turgor normal.  No rashes or lesions. Incision on R hip healing well, no obvious signs of infection. Neurologic:  Neurovascularly intact without any focal sensory/motor deficits. Cranial nerves: II-XII intact, grossly non-focal.  Psychiatric: Alert and oriented, thought content appropriate, normal insight  Capillary Refill: Brisk,< 3 seconds   Peripheral Pulses: +2 palpable, equal bilaterally       Labs:   Recent Labs     02/04/23  0509 02/06/23  0540   WBC 10.1 10.6   HGB 7.3* 5.1*   HCT 24.0* 16.9*    248     Recent Labs     02/04/23  0509 02/05/23  0439 02/06/23  0434   * 131* 136   K 5.3* 4.9 4.8   CL 98 97* 101   CO2 24 27 27   BUN 16 26* 22   CREATININE 1.0 0.8 0.7   CALCIUM 8.0* 8.2* 8.2*     No results for input(s): AST, ALT, BILIDIR, BILITOT, ALKPHOS in the last 72 hours. Recent Labs     02/04/23  0509   INR 1.72*     No results for input(s): Kristin Parker in the last 72 hours. Microbiology:      Urinalysis:      Lab Results   Component Value Date/Time    NITRU NEGATIVE 02/02/2023 05:10 PM    WBCUA 25-50 02/02/2023 02:10 PM    BACTERIA FEW 02/02/2023 02:10 PM    RBCUA 5-10 02/02/2023 02:10 PM    BLOODU NEGATIVE 02/02/2023 05:10 PM    SPECGRAV >1.030 02/02/2023 02:10 PM    GLUCOSEU NEGATIVE 02/02/2023 05:10 PM       Radiology:  XR LUMBAR SPINE (2-3 VIEWS)   Final Result   1. Slight thoracolumbar levoscoliosis. Cannot exclude muscle spasm right side. 2. Moderate vertebral body spondylosis scattered in the lumbar and lower thoracic spine.  Moderate degenerative facet arthropathy involving at least the lower 3 lumbar levels. 3. Diffuse demineralization of the bones, consistent with osteoporosis. Mild superior endplate depression fractures L2, L3, and L4 of questionable acuity but none of these were present on the prior CT abdomen dated 1/10/2022. These were all present, in    retrospect, on and abdominal film dated 1/25/2023. 4. MRI lumbar spine would be helpful for further evaluation. Patient may be a good candidate for vertebroplasty. If so, this can be arranged through the interventional scheduling desk of [facility] radiology department (extension [phone]). **This report has been created using voice recognition software. It may contain minor errors which are inherent in voice recognition technology. **      Final report electronically signed by Dr. Brandy Stein on 2/6/2023 8:23 AM      XR FEMUR RIGHT (MIN 2 VIEWS)   Final Result   Intraoperative imaging demonstrating open reduction internal fixation right hip            **This report has been created using voice recognition software. It may contain minor errors which are inherent in voice recognition technology. **      Final report electronically signed by Dr. Sabra Anne on 2/3/2023 3:02 PM      FLUORO FOR SURGICAL PROCEDURES   Final Result      XR CHEST PORTABLE   Final Result   1. Marked cardiomegaly. Right arm PICC line, catheter tip at cavoatrial projection. . Tracheostomy tube in place. 2. No acute findings. No infiltrates or effusions are seen. **This report has been created using voice recognition software. It may contain minor errors which are inherent in voice recognition technology. **      Final report electronically signed by Dr. Brandy Stein on 2/2/2023 2:20 PM      XR ABDOMEN (KUB) (SINGLE AP VIEW)   Final Result   Impression:    Resolved constipation. Right femoral intertrochanteric fracture.       This document has been electronically signed by: Teresa Callejas. Rosalia Pod on    02/02/2023 05:35 AM      CT CERVICAL SPINE WO CONTRAST   Final Result    No fracture. **This report has been created using voice recognition software. It may contain minor errors which are inherent in voice recognition technology. **      Final report electronically signed by Dr. Jd Orellana on 2/2/2023 7:38 AM      CT HEAD WO CONTRAST   Final Result    No evidence of an acute process. **This report has been created using voice recognition software. It may contain minor errors which are inherent in voice recognition technology. **      Final report electronically signed by Dr. Jd Orellana on 2/2/2023 7:25 AM      XR HIP 2-3 VW W PELVIS RIGHT   Final Result   Impression:   Acute right femoral intertrochanteric fracture, with subtrochanteric    extension. This document has been electronically signed by: Prince Wall MD on    02/02/2023 05:01 AM      XR CHEST 1 VIEW   Final Result   Impression:   No acute cardiopulmonary disease. Cardiomegaly. This document has been electronically signed by: Prince Wall MD on    02/02/2023 05:02 AM      CTA CHEST W WO CONTRAST   Final Result   Impression:   1. Stable bilateral nonocclusive pulmonary emboli. Negative for CT    evidence of right heart strain or thrombus propagation. No new pulmonary    emboli since the prior study 1/21/23. 2. New trace bilateral pleural effusions. 3. Nonemergent/incidental findings as above. This document has been electronically signed by: Prince Wall MD on    02/02/2023 02:13 AM      All CTs at this facility use dose modulation techniques and iterative    reconstructions, and/or weight-based dosing   when appropriate to reduce radiation to a low as reasonably achievable. 3D Post-processing was performed on this study.       MRI LUMBAR SPINE WO CONTRAST    (Results Pending)       DVT prophylaxis: [] Lovenox                                 [] SCDs                                 [] SQ Heparin                                 [] Encourage ambulation           [x] Already on Anticoagulation     Code Status: Full Code    PT/OT Eval Status:     Tele:   [] yes             [x] no    Electronically signed by Morgan Rodriguez MD on 2/6/2023 at 2:05 PM

## 2023-02-06 NOTE — PROGRESS NOTES
02/06/23 1416   Encounter Summary   Encounter Overview/Reason  Initial Encounter   Service Provided For: Patient; Family   Referral/Consult From: [address] Family members   Last Encounter  02/06/23   Complexity of Encounter Low   Begin Time 1400   End Time  1409   Total Time Calculated 9 min   Assessment/Intervention/Outcome   Assessment Coping   Intervention Active listening   Outcome Receptive   Plan and Referrals   Plan/Referrals Continue to visit, (comment)     ASSESSMENT  This is a 71-yr-old patient who has an acute heart condition. Her  was with her. P smiled and when I entered the room. Both the patient and her  was eating. INTERVENTION    Due to surgery, the patient was not able to communicate with me verbally. I wished her a speedy recovery and left. OUTCOME    The  thanked me. I told them that I would come back another time when the room is not busy. There was a nurse waiting to see the patient at that time     CARE PLAN  Continue to visit.

## 2023-02-07 LAB
ANION GAP SERPL CALC-SCNC: 6 MEQ/L (ref 8–16)
BUN SERPL-MCNC: 18 MG/DL (ref 7–22)
CA-I BLD ISE-SCNC: 1.16 MMOL/L (ref 1.12–1.32)
CALCIUM SERPL-MCNC: 8.3 MG/DL (ref 8.5–10.5)
CHLORIDE SERPL-SCNC: 103 MEQ/L (ref 98–111)
CO2 SERPL-SCNC: 28 MEQ/L (ref 23–33)
CREAT SERPL-MCNC: 0.7 MG/DL (ref 0.4–1.2)
DEPRECATED RDW RBC AUTO: 57.2 FL (ref 35–45)
ERYTHROCYTE [DISTWIDTH] IN BLOOD BY AUTOMATED COUNT: 16.3 % (ref 11.5–14.5)
GFR SERPL CREATININE-BSD FRML MDRD: > 60 ML/MIN/1.73M2
GLUCOSE BLD STRIP.AUTO-MCNC: 125 MG/DL (ref 70–108)
GLUCOSE BLD STRIP.AUTO-MCNC: 134 MG/DL (ref 70–108)
GLUCOSE BLD STRIP.AUTO-MCNC: 159 MG/DL (ref 70–108)
GLUCOSE BLD STRIP.AUTO-MCNC: 185 MG/DL (ref 70–108)
GLUCOSE SERPL-MCNC: 166 MG/DL (ref 70–108)
HAPTOGLOB SERPL-MCNC: 302 MG/DL (ref 30–200)
HCT VFR BLD AUTO: 20 % (ref 37–47)
HCT VFR BLD AUTO: 28.5 % (ref 37–47)
HGB BLD-MCNC: 6.4 GM/DL (ref 12–16)
HGB BLD-MCNC: 9.1 GM/DL (ref 12–16)
MCH RBC QN AUTO: 31.4 PG (ref 26–33)
MCHC RBC AUTO-ENTMCNC: 32 GM/DL (ref 32.2–35.5)
MCV RBC AUTO: 98 FL (ref 81–99)
PLATELET # BLD AUTO: 236 THOU/MM3 (ref 130–400)
PMV BLD AUTO: 9.5 FL (ref 9.4–12.4)
POTASSIUM SERPL-SCNC: 4.2 MEQ/L (ref 3.5–5.2)
RBC # BLD AUTO: 2.04 MILL/MM3 (ref 4.2–5.4)
SCAN OF BLOOD SMEAR: NORMAL
SODIUM SERPL-SCNC: 137 MEQ/L (ref 135–145)
WBC # BLD AUTO: 10 THOU/MM3 (ref 4.8–10.8)

## 2023-02-07 PROCEDURE — 6370000000 HC RX 637 (ALT 250 FOR IP): Performed by: INTERNAL MEDICINE

## 2023-02-07 PROCEDURE — 82948 REAGENT STRIP/BLOOD GLUCOSE: CPT

## 2023-02-07 PROCEDURE — 2700000000 HC OXYGEN THERAPY PER DAY

## 2023-02-07 PROCEDURE — 6370000000 HC RX 637 (ALT 250 FOR IP): Performed by: NURSE PRACTITIONER

## 2023-02-07 PROCEDURE — 85014 HEMATOCRIT: CPT

## 2023-02-07 PROCEDURE — 94760 N-INVAS EAR/PLS OXIMETRY 1: CPT

## 2023-02-07 PROCEDURE — 99233 SBSQ HOSP IP/OBS HIGH 50: CPT | Performed by: INTERNAL MEDICINE

## 2023-02-07 PROCEDURE — 2580000003 HC RX 258: Performed by: NURSE PRACTITIONER

## 2023-02-07 PROCEDURE — 85027 COMPLETE CBC AUTOMATED: CPT

## 2023-02-07 PROCEDURE — 85018 HEMOGLOBIN: CPT

## 2023-02-07 PROCEDURE — 80048 BASIC METABOLIC PNL TOTAL CA: CPT

## 2023-02-07 PROCEDURE — 82330 ASSAY OF CALCIUM: CPT

## 2023-02-07 PROCEDURE — 36415 COLL VENOUS BLD VENIPUNCTURE: CPT

## 2023-02-07 PROCEDURE — 2060000000 HC ICU INTERMEDIATE R&B

## 2023-02-07 PROCEDURE — 36430 TRANSFUSION BLD/BLD COMPNT: CPT

## 2023-02-07 RX ORDER — SODIUM CHLORIDE 9 MG/ML
INJECTION, SOLUTION INTRAVENOUS PRN
Status: DISCONTINUED | OUTPATIENT
Start: 2023-02-07 | End: 2023-02-10 | Stop reason: HOSPADM

## 2023-02-07 RX ADMIN — ASPIRIN 81 MG 81 MG: 81 TABLET ORAL at 08:29

## 2023-02-07 RX ADMIN — SENNOSIDES 8.6 MG: 8.6 TABLET, FILM COATED ORAL at 20:36

## 2023-02-07 RX ADMIN — QUETIAPINE FUMARATE 25 MG: 25 TABLET ORAL at 20:36

## 2023-02-07 RX ADMIN — QUETIAPINE FUMARATE 25 MG: 25 TABLET ORAL at 08:30

## 2023-02-07 RX ADMIN — RIVAROXABAN 15 MG: 15 TABLET, FILM COATED ORAL at 16:40

## 2023-02-07 RX ADMIN — INSULIN LISPRO 5 UNITS: 100 INJECTION, SOLUTION INTRAVENOUS; SUBCUTANEOUS at 08:49

## 2023-02-07 RX ADMIN — SODIUM CHLORIDE, PRESERVATIVE FREE 10 ML: 5 INJECTION INTRAVENOUS at 20:37

## 2023-02-07 RX ADMIN — SODIUM CHLORIDE, PRESERVATIVE FREE 10 ML: 5 INJECTION INTRAVENOUS at 08:32

## 2023-02-07 RX ADMIN — INSULIN GLARGINE 13 UNITS: 100 INJECTION, SOLUTION SUBCUTANEOUS at 08:34

## 2023-02-07 RX ADMIN — INSULIN LISPRO 5 UNITS: 100 INJECTION, SOLUTION INTRAVENOUS; SUBCUTANEOUS at 12:28

## 2023-02-07 RX ADMIN — OXYCODONE HYDROCHLORIDE 2.5 MG: 5 TABLET ORAL at 23:16

## 2023-02-07 RX ADMIN — RIVAROXABAN 15 MG: 15 TABLET, FILM COATED ORAL at 08:29

## 2023-02-07 RX ADMIN — ROSUVASTATIN 10 MG: 10 TABLET, FILM COATED ORAL at 08:30

## 2023-02-07 RX ADMIN — METOPROLOL SUCCINATE 25 MG: 25 TABLET, FILM COATED, EXTENDED RELEASE ORAL at 08:30

## 2023-02-07 RX ADMIN — FUROSEMIDE 20 MG: 20 TABLET ORAL at 08:31

## 2023-02-07 RX ADMIN — OXYCODONE HYDROCHLORIDE 2.5 MG: 5 TABLET ORAL at 10:17

## 2023-02-07 RX ADMIN — CIPROFLOXACIN 500 MG: 500 TABLET, FILM COATED ORAL at 08:31

## 2023-02-07 RX ADMIN — CIPROFLOXACIN 500 MG: 500 TABLET, FILM COATED ORAL at 20:36

## 2023-02-07 RX ADMIN — POLYETHYLENE GLYCOL (3350) 17 G: 17 POWDER, FOR SOLUTION ORAL at 08:32

## 2023-02-07 RX ADMIN — POLYETHYLENE GLYCOL (3350) 17 G: 17 POWDER, FOR SOLUTION ORAL at 20:36

## 2023-02-07 RX ADMIN — OXYCODONE HYDROCHLORIDE 2.5 MG: 5 TABLET ORAL at 01:00

## 2023-02-07 ASSESSMENT — PAIN SCALES - GENERAL
PAINLEVEL_OUTOF10: 0
PAINLEVEL_OUTOF10: 7
PAINLEVEL_OUTOF10: 7
PAINLEVEL_OUTOF10: 8
PAINLEVEL_OUTOF10: 6
PAINLEVEL_OUTOF10: 7

## 2023-02-07 ASSESSMENT — PAIN - FUNCTIONAL ASSESSMENT: PAIN_FUNCTIONAL_ASSESSMENT: PREVENTS OR INTERFERES WITH MANY ACTIVE NOT PASSIVE ACTIVITIES

## 2023-02-07 ASSESSMENT — PAIN DESCRIPTION - FREQUENCY: FREQUENCY: CONTINUOUS

## 2023-02-07 ASSESSMENT — PAIN SCALES - WONG BAKER
WONGBAKER_NUMERICALRESPONSE: 2
WONGBAKER_NUMERICALRESPONSE: 8

## 2023-02-07 ASSESSMENT — PAIN DESCRIPTION - ORIENTATION
ORIENTATION: RIGHT
ORIENTATION: RIGHT

## 2023-02-07 ASSESSMENT — PAIN DESCRIPTION - LOCATION
LOCATION: HIP

## 2023-02-07 ASSESSMENT — PAIN DESCRIPTION - ONSET: ONSET: ON-GOING

## 2023-02-07 ASSESSMENT — PAIN DESCRIPTION - DESCRIPTORS: DESCRIPTORS: SORE;SHARP

## 2023-02-07 NOTE — PLAN OF CARE
Problem: Discharge Planning  Goal: Discharge to home or other facility with appropriate resources  Outcome: Progressing  Flowsheets (Taken 2/7/2023 1834)  Discharge to home or other facility with appropriate resources:   Identify barriers to discharge with patient and caregiver   Identify discharge learning needs (meds, wound care, etc)   Arrange for needed discharge resources and transportation as appropriate   Refer to discharge planning if patient needs post-hospital services based on physician order or complex needs related to functional status, cognitive ability or social support system  Note: Plans to go to [facility], however, possible transfer to [facility]. Problem: Safety - Adult  Goal: Free from fall injury  Outcome: Progressing  Flowsheets (Taken 2/7/2023 1834)  Free From Fall Injury:   Instruct family/caregiver on patient safety   Based on caregiver fall risk screen, instruct family/caregiver to ask for assistance with transferring infant if caregiver noted to have fall risk factors  Note: No falls this shift. Problem: Skin/Tissue Integrity  Goal: Absence of new skin breakdown  Description: 1. Monitor for areas of redness and/or skin breakdown  2. Assess vascular access sites hourly  3. Every 4-6 hours minimum:  Change oxygen saturation probe site  4. Every 4-6 hours:  If on nasal continuous positive airway pressure, respiratory therapy assess nares and determine need for appliance change or resting period. Outcome: Progressing  Note: No new skin breakdown. Turning and repositioning. Problem: Neurosensory - Adult  Goal: Achieves stable or improved neurological status  Outcome: Progressing  Flowsheets (Taken 2/7/2023 1834)  Achieves stable or improved neurological status:   Assess for and report changes in neurological status   Initiate measures to prevent increased intracranial pressure  Note: Confused at times.    Goal: Achieves maximal functionality and self care  Outcome: Progressing  Flowsheets (Taken 2/7/2023 1834)  Achieves maximal functionality and self care:   Monitor swallowing and airway patency with patient fatigue and changes in neurological status   Encourage and assist patient to increase activity and self care with guidance from physical therapy/occupational therapy  Note: Working with PT and OT. Problem: Respiratory - Adult  Goal: Achieves optimal ventilation and oxygenation  Outcome: Progressing  Flowsheets (Taken 2/7/2023 1834)  Achieves optimal ventilation and oxygenation:   Assess for changes in respiratory status   Assess for changes in mentation and behavior   Assess and instruct to report shortness of breath or any respiratory difficulty   Respiratory therapy support as indicated  Note: On 28% trach mask. Frequently takes her trach mask off. Problem: Cardiovascular - Adult  Goal: Maintains optimal cardiac output and hemodynamic stability  Outcome: Progressing  Flowsheets (Taken 2/7/2023 1834)  Maintains optimal cardiac output and hemodynamic stability:   Monitor blood pressure and heart rate   Monitor urine output and notify Licensed Independent Practitioner for values outside of normal range  Note: Vitals stable. Problem: Pain  Goal: Verbalizes/displays adequate comfort level or baseline comfort level  Outcome: Progressing  Flowsheets (Taken 2/7/2023 1834)  Verbalizes/displays adequate comfort level or baseline comfort level:   Encourage patient to monitor pain and request assistance   Assess pain using appropriate pain scale  Note: Complains of pain in right leg - operative leg.       Problem: Chronic Conditions and Co-morbidities  Goal: Patient's chronic conditions and co-morbidity symptoms are monitored and maintained or improved  Outcome: Progressing  Flowsheets (Taken 2/7/2023 1834)  Care Plan - Patient's Chronic Conditions and Co-Morbidity Symptoms are Monitored and Maintained or Improved:   Monitor and assess patient's chronic conditions and comorbid symptoms for stability, deterioration, or improvement   Collaborate with multidisciplinary team to address chronic and comorbid conditions and prevent exacerbation or deterioration  Note: Monitoring chronic conditions. Problem: Genitourinary - Adult  Goal: Absence of urinary retention  Outcome: Progressing  Flowsheets (Taken 2/7/2023 1834)  Absence of urinary retention:   Assess patients ability to void and empty bladder   Discuss catheterization for long term situations as appropriate  Note: Not to remove unless ordered by Urology. Goal: Urinary catheter remains patent  Outcome: Progressing  Flowsheets (Taken 2/7/2023 1834)  Urinary catheter remains patent: Assess patency of urinary catheter  Note: Cleaned with CHG soap and water. Problem: Infection - Adult  Goal: Absence of infection at discharge  Outcome: Progressing  Flowsheets (Taken 2/7/2023 1834)  Absence of infection at discharge:   Monitor lab/diagnostic results   Assess and monitor for signs and symptoms of infection  Note: Had a UTI prior to admission. Goal: Absence of infection during hospitalization  Outcome: Progressing  Flowsheets (Taken 2/7/2023 1834)  Absence of infection during hospitalization:   Assess and monitor for signs and symptoms of infection   Monitor lab/diagnostic results   Identify and instruct in appropriate isolation precautions for identified infection/condition   Instruct and encourage patient and family to use good hand hygiene technique  Note: Washing hands and bathing in CHG soap. Problem: Nutrition Deficit:  Goal: Optimize nutritional status  Outcome: Progressing  Flowsheets (Taken 2/7/2023 1834)  Nutrient intake appropriate for improving, restoring, or maintaining nutritional needs:   Assess nutritional status and recommend course of action   Provide specific nutrition education to patient or family as appropriate  Note: Supplements as needed for optimizing healing.       Problem: ABCDS Injury Assessment  Goal: Absence of physical injury  Outcome: Progressing  Flowsheets (Taken 2/7/2023 3237)  Absence of Physical Injury: Implement safety measures based on patient assessment  Note: No physical injury this shift. Care plan reviewed with patient and family. Patient and family verbalize understanding of the plan of care and contribute to goal setting.

## 2023-02-07 NOTE — PLAN OF CARE
Problem: Discharge Planning  Goal: Discharge to home or other facility with appropriate resources  Outcome: Progressing  Flowsheets  Taken 2/6/2023 2000 by Irwin Tarango RN  Discharge to home or other facility with appropriate resources:   Identify barriers to discharge with patient and caregiver   Arrange for needed discharge resources and transportation as appropriate   Identify discharge learning needs (meds, wound care, etc)   Refer to discharge planning if patient needs post-hospital services based on physician order or complex needs related to functional status, cognitive ability or social support system  Taken 2/6/2023 0845 by Ela Moon RN  Discharge to home or other facility with appropriate resources:   Identify barriers to discharge with patient and caregiver   Arrange for needed discharge resources and transportation as appropriate   Identify discharge learning needs (meds, wound care, etc)   Refer to discharge planning if patient needs post-hospital services based on physician order or complex needs related to functional status, cognitive ability or social support system     Problem: Safety - Adult  Goal: Free from fall injury  Outcome: Progressing  Flowsheets (Taken 2/6/2023 2000)  Free From Fall Injury: Instruct family/caregiver on patient safety     Problem: Skin/Tissue Integrity  Goal: Absence of new skin breakdown  Description: 1. Monitor for areas of redness and/or skin breakdown  2. Assess vascular access sites hourly  3. Every 4-6 hours minimum:  Change oxygen saturation probe site  4. Every 4-6 hours:  If on nasal continuous positive airway pressure, respiratory therapy assess nares and determine need for appliance change or resting period.   Outcome: Progressing     Problem: Neurosensory - Adult  Goal: Achieves stable or improved neurological status  Outcome: Progressing  Flowsheets  Taken 2/6/2023 2000 by Irwin Tarango RN  Achieves stable or improved neurological status:   Assess for and report changes in neurological status   Maintain blood pressure and fluid volume within ordered parameters to optimize cerebral perfusion and minimize risk of hemorrhage  Taken 2/6/2023 0845 by Harlan Marte RN  Achieves stable or improved neurological status:   Assess for and report changes in neurological status   Initiate measures to prevent increased intracranial pressure   Maintain blood pressure and fluid volume within ordered parameters to optimize cerebral perfusion and minimize risk of hemorrhage   Monitor temperature, glucose, and sodium.  Initiate appropriate interventions as ordered  Goal: Achieves maximal functionality and self care  Outcome: Progressing  Flowsheets  Taken 2/6/2023 2000 by Kenyon Gagnon RN  Achieves maximal functionality and self care:   Monitor swallowing and airway patency with patient fatigue and changes in neurological status   Encourage and assist patient to increase activity and self care with guidance from physical therapy/occupational therapy  Taken 2/6/2023 0845 by Harlan Marte RN  Achieves maximal functionality and self care:   Monitor swallowing and airway patency with patient fatigue and changes in neurological status   Encourage and assist patient to increase activity and self care with guidance from physical therapy/occupational therapy   Encourage visually impaired, hearing impaired and aphasic patients to use assistive/communication devices     Problem: Respiratory - Adult  Goal: Achieves optimal ventilation and oxygenation  Outcome: Progressing  Flowsheets  Taken 2/6/2023 2000 by Kenyon Gagnon RN  Achieves optimal ventilation and oxygenation:   Assess for changes in respiratory status   Assess for changes in mentation and behavior   Position to facilitate oxygenation and minimize respiratory effort   Oxygen supplementation based on oxygen saturation or arterial blood gases   Assess the need for suctioning and aspirate as needed   Respiratory therapy support as indicated  Taken 2/6/2023 0845 by Maureen Scott RN  Achieves optimal ventilation and oxygenation:   Assess for changes in respiratory status   Assess for changes in mentation and behavior   Position to facilitate oxygenation and minimize respiratory effort   Oxygen supplementation based on oxygen saturation or arterial blood gases   Assess the need for suctioning and aspirate as needed     Problem: Cardiovascular - Adult  Goal: Maintains optimal cardiac output and hemodynamic stability  Outcome: Progressing  Flowsheets (Taken 2/6/2023 0845 by Maureen Scott RN)  Maintains optimal cardiac output and hemodynamic stability:   Monitor blood pressure and heart rate   Monitor urine output and notify Licensed Independent Practitioner for values outside of normal range   Assess for signs of decreased cardiac output   Administer fluid and/or volume expanders as ordered   Administer vasoactive medications as ordered     Problem: Genitourinary - Adult  Goal: Absence of urinary retention  Outcome: Progressing  Flowsheets  Taken 2/6/2023 2000 by Celia Guerrero RN  Absence of urinary retention: Assess patients ability to void and empty bladder  Taken 2/6/2023 0845 by Maureen Scott RN  Absence of urinary retention:   Assess patients ability to void and empty bladder   Monitor intake/output and perform bladder scan as needed   Place urinary catheter per Licensed Independent Practitioner order if needed  Goal: Urinary catheter remains patent  Outcome: Progressing  Flowsheets  Taken 2/6/2023 2000 by Celia Guerrero RN  Urinary catheter remains patent: Assess patency of urinary catheter  Taken 2/6/2023 0845 by Maureen Scott RN  Urinary catheter remains patent:   Assess patency of urinary catheter   Irrigate catheter per Licensed Independent Practitioner order if indicated and notify Licensed Independent Practitioner if unable to irrigate   Assess need for a larger catheter size or a 3-way catheter for continuous bladder irrigation     Problem: Infection - Adult  Goal: Absence of infection at discharge  Outcome: Progressing  Flowsheets  Taken 2/6/2023 2000 by Shruthi Tomas RN  Absence of infection at discharge:   Assess and monitor for signs and symptoms of infection   Monitor lab/diagnostic results   Monitor all insertion sites i.e., indwelling lines, tubes and drains   Administer medications as ordered   Instruct and encourage patient and family to use good hand hygiene technique  Taken 2/6/2023 0845 by Andria Talbert RN  Absence of infection at discharge:   Assess and monitor for signs and symptoms of infection   Monitor lab/diagnostic results   Monitor all insertion sites i.e., indwelling lines, tubes and drains   Administer medications as ordered   Instruct and encourage patient and family to use good hand hygiene technique  Goal: Absence of infection during hospitalization  Outcome: Progressing  Flowsheets  Taken 2/6/2023 2000 by Shruthi Tomas RN  Absence of infection during hospitalization:   Assess and monitor for signs and symptoms of infection   Monitor lab/diagnostic results   Monitor all insertion sites i.e., indwelling lines, tubes and drains   Administer medications as ordered   Instruct and encourage patient and family to use good hand hygiene technique  Taken 2/6/2023 0845 by Andria Talbert RN  Absence of infection during hospitalization:   Assess and monitor for signs and symptoms of infection   Monitor lab/diagnostic results   Monitor all insertion sites i.e., indwelling lines, tubes and drains   Administer medications as ordered   Instruct and encourage patient and family to use good hand hygiene technique     Problem: Pain  Goal: Verbalizes/displays adequate comfort level or baseline comfort level  Outcome: Progressing  Flowsheets (Taken 2/6/2023 1953)  Verbalizes/displays adequate comfort level or baseline comfort level:   Encourage patient to monitor pain and request assistance   Assess pain using appropriate pain scale   Administer analgesics based on type and severity of pain and evaluate response   Implement non-pharmacological measures as appropriate and evaluate response   Notify Licensed Independent Practitioner if interventions unsuccessful or patient reports new pain     Problem: Chronic Conditions and Co-morbidities  Goal: Patient's chronic conditions and co-morbidity symptoms are monitored and maintained or improved  Outcome: Progressing  Flowsheets  Taken 2/6/2023 2000 by Chelsea David 34 - Patient's Chronic Conditions and Co-Morbidity Symptoms are Monitored and Maintained or Improved:   Monitor and assess patient's chronic conditions and comorbid symptoms for stability, deterioration, or improvement   Collaborate with multidisciplinary team to address chronic and comorbid conditions and prevent exacerbation or deterioration   Update acute care plan with appropriate goals if chronic or comorbid symptoms are exacerbated and prevent overall improvement and discharge  Taken 2/6/2023 0845 by hCelsea Hays 34 - Patient's Chronic Conditions and Co-Morbidity Symptoms are Monitored and Maintained or Improved:   Monitor and assess patient's chronic conditions and comorbid symptoms for stability, deterioration, or improvement   Collaborate with multidisciplinary team to address chronic and comorbid conditions and prevent exacerbation or deterioration   Update acute care plan with appropriate goals if chronic or comorbid symptoms are exacerbated and prevent overall improvement and discharge     Problem: Nutrition Deficit:  Goal: Optimize nutritional status  2/6/2023 2142 by Stacie Walker RN  Outcome: Progressing  2/6/2023 1550 by Lili Harrington RD, LD  Flowsheets (Taken 2/6/2023 1550)  Nutrient intake appropriate for improving, restoring, or maintaining nutritional needs:   Assess nutritional status and recommend course of action   Monitor oral intake, labs, and treatment plans   Recommend appropriate diets, oral nutritional supplements, and vitamin/mineral supplements   Provide specific nutrition education to patient or family as appropriate     Problem: ABCDS Injury Assessment  Goal: Absence of physical injury  Outcome: Progressing

## 2023-02-07 NOTE — CARE COORDINATION
2/7/23, 8:36 AM EST    DISCHARGE ON GOING [address] day: 5  Location: -24/024-A Reason for admit: Acute diastolic heart failure ([address] [I50.31]  Leg swelling [M79.89]  Supratherapeutic INR [R79.1]  Fall, initial encounter [W19. XXXA]  Closed displaced intertrochanteric fracture of right femur, initial encounter ([facility] [address] [S72.141A]  Acute heart failure, unspecified heart failure type ([address] [I50.9]   Procedure:   2/2 CTA Chest W WO Contrast 1. Stable bilateral nonocclusive pulmonary emboli. Negative for CT   evidence of right heart strain or thrombus propagation. No new pulmonary   emboli since the prior study 1/21/23. 2. New trace bilateral pleural effusions. 3. Nonemergent/incidental findings as above. 2/2 CXR 1. Marked cardiomegaly. Right arm PICC line, catheter tip at cavoatrial projection. . Tracheostomy tube in place. 2. No acute findings. No infiltrates or effusions are seen. 2/2 XR Hip VW W Pelvis right Acute right femoral intertrochanteric fracture, with subtrochanteric   extension. 2/3 Open treatment right intertrochanteric femur fracture the cephalomedullary nail 30753    2/6 XR Lumbar Spine 1. Slight thoracolumbar levoscoliosis. Cannot exclude muscle spasm right side. 2. Moderate vertebral body spondylosis scattered in the lumbar and lower thoracic spine. Moderate degenerative facet arthropathy involving at least the lower 3 lumbar levels. 3. Diffuse demineralization of the bones, consistent with osteoporosis. Mild superior endplate depression fractures L2, L3, and L4 of questionable acuity but none of these were present on the prior CT abdomen dated 1/10/2022. These were all present, in   retrospect, on and abdominal film dated 1/25/2023. 4. MRI lumbar spine would be helpful for further evaluation. Patient may be a good candidate for vertebroplasty.  If so, this can be arranged through the interventional scheduling desk of [facility] radiology department (extension [phone]). 2/6 MRI Lumbar Spine WO Contrast 1. Acute fractures with fluid clefts associated with the endplates at the J3-M9 levels. The height loss of each vertebral body is mild. These are most likely pathologic secondary to underlying osteoporosis. 2. 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 Barriers to Discharge: Hgb 6.4, to be transfused PRBC's, PT/OT, Asa, Cipro, Lasix, D 10, diabetes management, Xarelto, electrolyte replacement protocols. PCP: Starr Vail MD  Readmission Risk Score: 33%  Patient Goals/Plan/Treatment Preferences: From home with spouse. Planning IPR when medically stable.

## 2023-02-07 NOTE — PROGRESS NOTES
Head to Toe Assessment    Report received at 0730 from primary nurse  Head to toe assessment Preformed at 0750 on 2/7/2023    Skin  General skin appearance / Description: Skin is appropriate for ethnicity, warm, and dry  Incisions / Wounds: Incision on right anterior thigh and permanent trach in place     Neuro/Head  LOC: A+O x 3. Disoriented to situation  Eyes PERRLA 2 mm  Symmetry of face / tongue: symmetrical  JVD of neck: none    Chest  Heart sounds / Apical Pulse: regular and clear  Dyspnea: none  Lung sounds: clear  Cough: present  Respirations: unlabored     Abdomen/ Pelvis  Bowel sounds: active in all 4 quadrants  Passing flatus: yes  Palpation / Inspection: soft and rounded  Last BM: none documented   Stool assessment: YINA  Any GI issues: none  Urinary issues: urinary retention and catheter in place  Continence: yes  Urine color / turbidity: irving, clear, and no odor    Extremities  Edema: bilateral edema in the lower extremities +2  Upper extremity push / pulls: equal bilaterally and moderate strength  Left Arm Radial Pulse: +2 Capillary Refill: less then 3  Right Arm Radial Pulse: +2 Capillary Refill: less then 3  Lower extremity pedal push / pulls: present, moderate strength  Left pedal pulse: +1 Left posterior tibialis pulse: +1 Left lower extremity capillary refill: less then 3 seconds  Right pedal pulse: +1 Right posterior tibialis pulse: +1 Right lower extremity capillary refill: less then 3 seconds  Drift of extremities: none present  Pain: average a 7 on scale from 1-10.  Pain generally located in right hip    Other issues: none    Second Assessment preformed at 1330, no changes were noted    Report handoff to primary nurse at 1425    Electronically signed by Zenaida Aponte on 2/7/2023 at 11:44 AM

## 2023-02-07 NOTE — PROGRESS NOTES
[address] THERAPY MISSED TREATMENT NOTE  [facility] ICU STEPDOWN TELEMETRY 4K  4K-24/024-A      Date: 2023  Patient Name: Nimo Maloney        CSN: [id number]   : 1950  (81 y.o.)  Gender: female   Referring Practitioner: WYATT Palacio CNP  Diagnosis: acute dystolic heart failure         REASON FOR MISSED TREATMENT: Hold Treatment per Nursing patient has a hemoglobin of 6.2. Will attempt again next available day.

## 2023-02-07 NOTE — PROGRESS NOTES
[address]  PHYSICAL THERAPY MISSED TREATMENT NOTE  [facility] ICU STEPDOWN TELEMETRY 4K    Date: 2023  Patient Name: Susan Oneil        MRN: [id number]   : 1950  (81 y.o.)  Gender: female   Referring Practitioner: WYATT Harper CNP  Diagnosis: acute diastolic heart failure         REASON FOR MISSED TREATMENT:  Missed Treat. Pt receiving blood transfusion, hemoglobin 6.5, will hold.

## 2023-02-07 NOTE — CONSENT
Informed Consent for Blood Component Transfusion Note    I have discussed with the patient the rationale for blood component transfusion; its benefits in treating or preventing fatigue, organ damage, or death; and its risk which includes mild transfusion reactions, rare risk of blood borne infection, or more serious but rare reactions. I have discussed the alternatives to transfusion, including the risk and consequences of not receiving transfusion. The patient had an opportunity to ask questions and had agreed to proceed with transfusion of blood components.     Electronically signed by Diane Hurst MD on 2/7/23 at 10:28 AM EST

## 2023-02-07 NOTE — PROGRESS NOTES
Continue to follow patient clinical condition and response to therapies for possible admission to [facility] when stable, able to tolerate therapy and has had bowel movement. Last charted bowel movement is 1/28/2023.

## 2023-02-07 NOTE — PROGRESS NOTES
Attending supervising physicians attestation statement:       I Discussed the findings and plans with the resident physician  personally   and agree with the IM resident'S note as outlined . Also spoke with the staff  Regarding care plans and recommendations. Spoke with her spouse,   Earlier family requested a transfer to [facility], confirmed with spouse. So   Arrangements have been initiated, mean time check CT abd due to   Significant anemia of unknown etiology , and this was d/w them, along   With CT lumbar spine film results, no surgery while on anticoagulants. Long term per IR. Electronically signed by Laine Montalvo MD on 2/7/2023 at 3:51 PM              Hospitalist Progress Note    Patient:  Manon Dakin      Unit/Bed:-24/024-A    YOB: 1950    MRN: [id number]       Acct: [de-identified]     PCP: Palomo Hayward MD    Date of Admission: 2/1/2023    Assessment/Plan:    Acute right intertrochanteric femur fracture  -Ortho consult  -Pain management w/ PRN morphine 1mg Q6  -S/p R cephalomedullary nail by Dr. Bonilla Smalls 2/3/23  - Requested Rehab consult, for dispo issues. Supra therapeutic INR  - resolved  -Xarelto re started by Ortho today  - Vitamin K PO given pre op   -1 unit FFP ordered and consent obtained   - Daily INR's  Stable PE  -CTA confirming b/l non-obstructive PEs   -Will resume anticoagulation after surgery  Subclinical Hypothyroidism  -Consider synthroid treatment  B/L LE edema   -Elevation for edema   -No compression due to DVTs  CAD  -Continue ASA, BB, statins  Acute on chronic macrocytic anemia  - Currently stable but will monitor and transfuse if Hgb drops below 7.  -Hgb drop from 9-7.3. Will continue to monitor.  -Hgb drop to 5.1. Transfusion ordered PRCx2  -H&H ordered to verify accuracy of prior results. -Bilirubin, Haptoglobin, LDH, B12 & Folate ordered to assess for hemolytic etiology. -After 2 units of blood Hgb was still 6.5. 2 more units were ordered.   -CT abdomen ordered to investigate for retroperitoneal bleeding or other abd pathologies. IDDM2   -Continue lantus   -Continue medium dose SSI   -POC glucose checks  -Due to elevated sugars increased the lantus and sliding   -Scale coverage, previous A1c 8.8 and most recent  5.5  Elevated Troponin and denies any CP  -Stable since 1/21/23   Chronic Respiratory failure  -Pt has Hx of tracheostomy and tracheal tenosis  -O2 at baseline, continue O2 via trach mask.  -Sputum Cx positive for pseudomonas. Suspect colonized. Pt started on Cipro. Obesity   -BMI 38.41   -Discussed lifestyle changes. Essential HTN    -Running on the lower side, cut down BB to 12.5 toprol daily with parameters  13. Mild periods of confusion   -Did cut down the pain meds   -Trial of seroquel 25 mg BID, and check EKG  on Tuesday   14. Dispo   -plans discussed with spouse and therapist, consulted PMR for further assistance, IPR vs NH, encouraged her to cooperate with the therapy team.   -2/07/23 afternoon patient's daughters and  requesting pt be transferred to [address] [place] [address] and [facility] transfer centers for patient transfer to [facility]. Advised she has been added to the transfer list and will advise if the pt qualifies financially, a service accepts the pt, and dependent on bed availability.   -Transfer to [facility] initiated    15. Depression fractures of L2, L3, L4.  -XR showing compression fractures with questionable acuity. -MRI ordered showing acute fractures of L2-S1 likely 2/2 osteoporosis. 16. Constipation  -Pt stated that she has had only one large BM since arrival.  -On Senokot, Glycolax  -Pt denies nausea, has no vomiting, and has been eating normally.     Expected discharge date:      Disposition:    [] Home       [] TCU       [] Rehab       [] Psych       [] SNF       [] [facility]       [x] Other-transfer to [facility]    Chief Complaint: Lower extremity edema    Hospital Course:  Ludmila Viera is a 73yoF with PMH of recent PE and b/l DVT, macrocytic anemia, CAD, DM2, tracheostomy dependent, obesity, and HTN who presents for leg swelling, nausea, and constipation. She was recently discharged after an 8 day admission for DVTs and PEs. She was placed on Xarelto when discharged and stated that she has been compliant and only missed one dose. She returned due to chronic constipation, feeling ''bloated'', nausea, and LE edema. While being evaluated in the ED the pt had a mechanical fall and developed a R femur fracture. She was found to have supratheraputic INR at 3.83. CT head and C-spine both clear. 2/2/23  -Her pain is being managed with morphine and her Xarelto is being held. She was also given Vitamin K to reduce her INR. -Urology was consulted to place a werner due to concerns of urinary retention. Resource RN, and multiple other nurses, was unable to successfully place werner. Will f/u with Urology. 2/3/23  -Pt had improvement in INR but it was still elevated over 2. FFP was ordered and consent was obtained after discussing risks and benefits with patient and her .     -Pt will go to surgery today after INR improves. 2/4/23  -Pt s/p cephalomedullary nail on R IT femur fracture by Dr. Regina Noel in Hgb, will continue to monitor and transfuse as needed. -Urology recommends to continue werner until pt is mobile, having good BMs and has improvement in swelling with completed diuresis.    -Pt given 1g Calcium gluc due to hypocalcemia and mild increase in K+. Suspecting K+ is secondary to hemolysis. 2/5/23    Pt tolerating diet , and offers no active new complaints, spouse at   Bed side, periods of confusion . 2/6/23  Pt's hemoglobin was found to be 5.1. Pt had no new complaints during assessment. While MM were pale pt had no complaints of chest pain or sob. No signs of hemorrhage were found on exam. Work-up for concerns of hemolytic cause ordered.  Transfusion indicated and another H&H was ordered to verify accuracy of results. Will continue Xarelto due to presence of PEs and DVTs. Otherwise, pt's pain from R hip surgery was controlled. -PMR consulted who ordered lumbar XR showin. Slight thoracolumbar levoscoliosis. Cannot exclude muscle spasm right side. 2. Moderate vertebral body spondylosis scattered in the lumbar and lower thoracic spine. Moderate degenerative facet arthropathy involving at least the lower 3 lumbar levels. 3. Diffuse demineralization of the bones, consistent with osteoporosis. Mild superior endplate depression fractures L2, L3, and L4 of questionable acuity but none of these were present on the prior CT abdomen dated 1/10/2022. These were all present, in    retrospect, on and abdominal film dated 2023. 4. MRI lumbar spine would be helpful for further evaluation. Patient may be a good candidate for vertebroplasty. If so, this can be arranged through the interventional scheduling desk of [facility] radiology department (extension [phone]). Pt did admit to Hx off spinal injury therefore suspect injuries are not acute. Will continue to follow with PMR and MRI results. 23  - Pt has no new complaints today and is CAOx4 eating breakfast.  -IR consulted for possible vertebroplasty, due to acute lumbar fractures.   -Calcium at 8.2, ordered Ionized Ca+ levels  -Hgb 6.4 so 2 more units of blood ordered. Pt agreed to the treatment. -CT abd ordered for investigation into patient's anemia.  -Family requesting transfer to [address] [place] [address], Transfer initiated. -Discussed with family possibility of out of pocket cost for transfer transport. Subjective (past 24 hours): Pt was well appearing and eating breakfast in bed. Spoke with the patient in the morning and she was CAOx4 and only complaining of mild to moderate pain in her R hip and lower back. Her Hgb was still decreased and discussed with the pt need for more blood. She agreed. Transfer to [address] [place] [address] requested by family.  They stated that the reason is they want answers and feel that [facility] would be able to provide that. Pt was there before, 50 years ago. ROS (12 point review of systems completed. Pertinent positives noted. Otherwise ROS is negative). Medications:  Reviewed    Infusion Medications    sodium chloride      sodium chloride      dextrose      sodium chloride      sodium chloride       Scheduled Medications    metoprolol succinate  25 mg Oral Daily    polyethylene glycol  17 g Oral BID    lidocaine  2 patch TransDERmal Daily    ciprofloxacin  500 mg Oral 2 times per day    insulin lispro  5 Units SubCUTAneous TID WC    insulin glargine  28 Units SubCUTAneous Nightly    insulin glargine  13 Units SubCUTAneous Daily with breakfast    QUEtiapine  25 mg Oral BID    aspirin  81 mg Oral Daily    rivaroxaban  15 mg Oral BID WC    rosuvastatin  10 mg Oral Daily    senna  1 tablet Oral Nightly    sodium chloride flush  5-40 mL IntraVENous 2 times per day    insulin lispro  0-8 Units SubCUTAneous TID WC    insulin lispro  0-4 Units SubCUTAneous Nightly    furosemide  20 mg Oral Daily     PRN Meds: sodium chloride, sodium chloride, glucose, dextrose bolus **OR** dextrose bolus, glucagon (rDNA), dextrose, sodium chloride, albuterol sulfate HFA, Menthol, guaiFENesin, sodium chloride flush, sodium chloride, acetaminophen **OR** acetaminophen, potassium chloride **OR** potassium alternative oral replacement **OR** potassium chloride, magnesium sulfate, oxyCODONE **OR** [DISCONTINUED] oxyCODONE, hydrOXYzine      Intake/Output Summary (Last 24 hours) at 2/7/2023 1546  Last data filed at 2/7/2023 1358  Gross per 24 hour   Intake 804 ml   Output 1200 ml   Net -396 ml     Diet:  ADULT DIET; Regular; 4 carb choices (60 gm/meal)  ADULT ORAL NUTRITION SUPPLEMENT; Breakfast, Dinner;  Wound Healing Oral Supplement    Exam:  BP 99/62   Pulse 97   Temp 99.1 °F (37.3 °C) (Oral)   Resp 20   Ht 5' 2\" (1.575 m)   Wt 225 lb 6.4 oz (102.2 kg)   LMP  (LMP Unknown)   SpO2 98%   BMI 41.23 kg/m²     General appearance: No apparent distress, appears stated age and cooperative. , but obese   HEENT: Pupils equal, round, and reactive to light. Conjunctivae/corneas clear. Neck: Supple, with full range of motion. No jugular venous distention. Trachea midline. Respiratory:  Via tracheostomy, Normal respiratory effort. Clear to auscultation, bilaterally without Rales/Wheezes/Rhonchi. Cardiovascular: Regular rate and rhythm with normal  Abdomen: Soft, mild periumbilical tenderness, non-distended  Musculoskeletal: R hip tenderness, B/l +2 pitting pedal edema, s/p R hip surgery. Skin: Skin color, texture, turgor normal.  No rashes or lesions. Incision on R hip healing well, no obvious signs of infection. Neurologic:  Neurovascularly intact without any focal sensory/motor deficits. Cranial nerves: II-XII intact, grossly non-focal.  Psychiatric: Alert and oriented, thought content appropriate, normal insight  Capillary Refill: Brisk,< 3 seconds   Peripheral Pulses: +2 palpable, equal bilaterally       Labs:   Recent Labs     02/06/23  0540 02/06/23  1915 02/07/23  0602   WBC 10.6  --  10.0   HGB 5.1* 6.5* 6.4*   HCT 16.9* 20.7* 20.0*     --  236     Recent Labs     02/05/23  0439 02/06/23  0434 02/07/23  0602   * 136 137   K 4.9 4.8 4.2   CL 97* 101 103   CO2 27 27 28   BUN 26* 22 18   CREATININE 0.8 0.7 0.7   CALCIUM 8.2* 8.2* 8.3*     Recent Labs     02/06/23  1330   BILITOT 0.5       No results for input(s): INR in the last 72 hours. No results for input(s): Peyton Radford in the last 72 hours.     Microbiology:      Urinalysis:      Lab Results   Component Value Date/Time    NITRU NEGATIVE 02/02/2023 05:10 PM    WBCUA 25-50 02/02/2023 02:10 PM    BACTERIA FEW 02/02/2023 02:10 PM    RBCUA 5-10 02/02/2023 02:10 PM    BLOODU NEGATIVE 02/02/2023 05:10 PM    SPECGRAV >1.030 02/02/2023 02:10 PM    GLUCOSEU NEGATIVE 02/02/2023 05:10 PM       Radiology:  MRI LUMBAR SPINE WO CONTRAST   Final Result       1. Acute fractures with fluid clefts associated with the endplates at the K1-A0 levels. The height loss of each vertebral body is mild. These are most likely pathologic secondary to underlying osteoporosis. 2. 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 **This report has been created using voice recognition software. It may contain minor errors which are inherent in voice recognition technology. **      Final report electronically signed by Dr. Kory Dodge on 2/6/2023 3:20 PM      XR LUMBAR SPINE (2-3 VIEWS)   Final Result   1. Slight thoracolumbar levoscoliosis. Cannot exclude muscle spasm right side. 2. Moderate vertebral body spondylosis scattered in the lumbar and lower thoracic spine. Moderate degenerative facet arthropathy involving at least the lower 3 lumbar levels. 3. Diffuse demineralization of the bones, consistent with osteoporosis. Mild superior endplate depression fractures L2, L3, and L4 of questionable acuity but none of these were present on the prior CT abdomen dated 1/10/2022. These were all present, in    retrospect, on and abdominal film dated 1/25/2023. 4. MRI lumbar spine would be helpful for further evaluation. Patient may be a good candidate for vertebroplasty. If so, this can be arranged through the interventional scheduling desk of [facility] radiology department (extension [phone]). **This report has been created using voice recognition software. It may contain minor errors which are inherent in voice recognition technology. **      Final report electronically signed by Dr. Maggie Oquendo on 2/6/2023 8:23 AM      XR FEMUR RIGHT (MIN 2 VIEWS)   Final Result   Intraoperative imaging demonstrating open reduction internal fixation right hip            **This report has been created using voice recognition software.   It may contain minor errors which are inherent in voice recognition technology. **      Final report electronically signed by Dr. Daisy Aguilar on 2/3/2023 3:02 PM      FLUORO FOR SURGICAL PROCEDURES   Final Result      XR CHEST PORTABLE   Final Result   1. Marked cardiomegaly. Right arm PICC line, catheter tip at cavoatrial projection. . Tracheostomy tube in place. 2. No acute findings. No infiltrates or effusions are seen. **This report has been created using voice recognition software. It may contain minor errors which are inherent in voice recognition technology. **      Final report electronically signed by Dr. Noemi Lezama on 2/2/2023 2:20 PM      XR ABDOMEN (KUB) (SINGLE AP VIEW)   Final Result   Impression:    Resolved constipation. Right femoral intertrochanteric fracture. This document has been electronically signed by: Byron Montiel. Swathi Wiley on    02/02/2023 05:35 AM      CT CERVICAL SPINE WO CONTRAST   Final Result    No fracture. **This report has been created using voice recognition software. It may contain minor errors which are inherent in voice recognition technology. **      Final report electronically signed by Dr. Elisabeth Jenkins on 2/2/2023 7:38 AM      CT HEAD WO CONTRAST   Final Result    No evidence of an acute process. **This report has been created using voice recognition software. It may contain minor errors which are inherent in voice recognition technology. **      Final report electronically signed by Dr. Elisabeth Jenkins on 2/2/2023 7:25 AM      XR HIP 2-3 VW W PELVIS RIGHT   Final Result   Impression:   Acute right femoral intertrochanteric fracture, with subtrochanteric    extension. This document has been electronically signed by: Rainer Diamond MD on    02/02/2023 05:01 AM      XR CHEST 1 VIEW   Final Result   Impression:   No acute cardiopulmonary disease. Cardiomegaly.       This document has been electronically signed by: Rainer Diamond MD on    02/02/2023 05:02 AM      CTA CHEST [address] Final Result   Impression:   1. Stable bilateral nonocclusive pulmonary emboli. Negative for CT    evidence of right heart strain or thrombus propagation. No new pulmonary    emboli since the prior study 1/21/23. 2. New trace bilateral pleural effusions. 3. Nonemergent/incidental findings as above. This document has been electronically signed by: Juan Jose Edmonds MD on    02/02/2023 02:13 AM      All CTs at this facility use dose modulation techniques and iterative    reconstructions, and/or weight-based dosing   when appropriate to reduce radiation to a low as reasonably achievable. 3D Post-processing was performed on this study.       CT ABDOMEN PELVIS WO CONTRAST Additional Contrast? None    (Results Pending)       DVT prophylaxis: [] Lovenox                                 [] SCDs                                 [] SQ Heparin                                 [] Encourage ambulation           [x] Already on Anticoagulation     Code Status: Full Code    PT/OT Eval Status:     Tele:   [] yes             [x] no    Electronically signed by Yevgeniy Retana MD on 2/7/2023 at 3:46 PM

## 2023-02-08 ENCOUNTER — APPOINTMENT (OUTPATIENT)
Dept: CT IMAGING | Age: 73
End: 2023-02-08
Payer: MEDICARE

## 2023-02-08 LAB
GLUCOSE BLD STRIP.AUTO-MCNC: 163 MG/DL (ref 70–108)
GLUCOSE BLD STRIP.AUTO-MCNC: 173 MG/DL (ref 70–108)
GLUCOSE BLD STRIP.AUTO-MCNC: 175 MG/DL (ref 70–108)
GLUCOSE BLD STRIP.AUTO-MCNC: 175 MG/DL (ref 70–108)
GLUCOSE BLD STRIP.AUTO-MCNC: 181 MG/DL (ref 70–108)
HCT VFR BLD AUTO: 28.3 % (ref 37–47)
HCT VFR BLD AUTO: 28.5 % (ref 37–47)
HGB BLD-MCNC: 9 GM/DL (ref 12–16)
HGB BLD-MCNC: 9.2 GM/DL (ref 12–16)

## 2023-02-08 PROCEDURE — 94760 N-INVAS EAR/PLS OXIMETRY 1: CPT

## 2023-02-08 PROCEDURE — 2700000000 HC OXYGEN THERAPY PER DAY

## 2023-02-08 PROCEDURE — 97530 THERAPEUTIC ACTIVITIES: CPT

## 2023-02-08 PROCEDURE — 85014 HEMATOCRIT: CPT

## 2023-02-08 PROCEDURE — 6370000000 HC RX 637 (ALT 250 FOR IP): Performed by: NURSE PRACTITIONER

## 2023-02-08 PROCEDURE — 6370000000 HC RX 637 (ALT 250 FOR IP): Performed by: HOSPITALIST

## 2023-02-08 PROCEDURE — 85610 PROTHROMBIN TIME: CPT

## 2023-02-08 PROCEDURE — 97110 THERAPEUTIC EXERCISES: CPT

## 2023-02-08 PROCEDURE — 82948 REAGENT STRIP/BLOOD GLUCOSE: CPT

## 2023-02-08 PROCEDURE — 36415 COLL VENOUS BLD VENIPUNCTURE: CPT

## 2023-02-08 PROCEDURE — 85018 HEMOGLOBIN: CPT

## 2023-02-08 PROCEDURE — 74176 CT ABD & PELVIS W/O CONTRAST: CPT

## 2023-02-08 PROCEDURE — 6370000000 HC RX 637 (ALT 250 FOR IP): Performed by: INTERNAL MEDICINE

## 2023-02-08 PROCEDURE — 2060000000 HC ICU INTERMEDIATE R&B

## 2023-02-08 PROCEDURE — 99233 SBSQ HOSP IP/OBS HIGH 50: CPT | Performed by: HOSPITALIST

## 2023-02-08 PROCEDURE — 2580000003 HC RX 258: Performed by: NURSE PRACTITIONER

## 2023-02-08 RX ORDER — FOLIC ACID 1 MG/1
1 TABLET ORAL DAILY
Status: DISCONTINUED | OUTPATIENT
Start: 2023-02-08 | End: 2023-02-10 | Stop reason: HOSPADM

## 2023-02-08 RX ADMIN — ASPIRIN 81 MG 81 MG: 81 TABLET ORAL at 08:39

## 2023-02-08 RX ADMIN — INSULIN GLARGINE 28 UNITS: 100 INJECTION, SOLUTION SUBCUTANEOUS at 22:07

## 2023-02-08 RX ADMIN — FUROSEMIDE 20 MG: 20 TABLET ORAL at 08:39

## 2023-02-08 RX ADMIN — SODIUM CHLORIDE, PRESERVATIVE FREE 10 ML: 5 INJECTION INTRAVENOUS at 08:40

## 2023-02-08 RX ADMIN — POLYETHYLENE GLYCOL (3350) 17 G: 17 POWDER, FOR SOLUTION ORAL at 20:02

## 2023-02-08 RX ADMIN — OXYCODONE HYDROCHLORIDE 2.5 MG: 5 TABLET ORAL at 17:51

## 2023-02-08 RX ADMIN — INSULIN GLARGINE 13 UNITS: 100 INJECTION, SOLUTION SUBCUTANEOUS at 08:50

## 2023-02-08 RX ADMIN — QUETIAPINE FUMARATE 25 MG: 25 TABLET ORAL at 20:02

## 2023-02-08 RX ADMIN — INSULIN LISPRO 5 UNITS: 100 INJECTION, SOLUTION INTRAVENOUS; SUBCUTANEOUS at 13:18

## 2023-02-08 RX ADMIN — OXYCODONE HYDROCHLORIDE 2.5 MG: 5 TABLET ORAL at 09:30

## 2023-02-08 RX ADMIN — RIVAROXABAN 15 MG: 15 TABLET, FILM COATED ORAL at 17:11

## 2023-02-08 RX ADMIN — FOLIC ACID 1 MG: 1 TABLET ORAL at 13:18

## 2023-02-08 RX ADMIN — OXYCODONE HYDROCHLORIDE 2.5 MG: 5 TABLET ORAL at 13:17

## 2023-02-08 RX ADMIN — CIPROFLOXACIN 500 MG: 500 TABLET, FILM COATED ORAL at 08:39

## 2023-02-08 RX ADMIN — INSULIN LISPRO 5 UNITS: 100 INJECTION, SOLUTION INTRAVENOUS; SUBCUTANEOUS at 08:50

## 2023-02-08 RX ADMIN — QUETIAPINE FUMARATE 25 MG: 25 TABLET ORAL at 09:53

## 2023-02-08 RX ADMIN — RIVAROXABAN 15 MG: 15 TABLET, FILM COATED ORAL at 08:39

## 2023-02-08 RX ADMIN — INSULIN LISPRO 5 UNITS: 100 INJECTION, SOLUTION INTRAVENOUS; SUBCUTANEOUS at 17:10

## 2023-02-08 RX ADMIN — ACETAMINOPHEN 650 MG: 325 TABLET ORAL at 20:02

## 2023-02-08 RX ADMIN — ROSUVASTATIN 10 MG: 10 TABLET, FILM COATED ORAL at 08:39

## 2023-02-08 RX ADMIN — POLYETHYLENE GLYCOL (3350) 17 G: 17 POWDER, FOR SOLUTION ORAL at 09:30

## 2023-02-08 RX ADMIN — SODIUM CHLORIDE, PRESERVATIVE FREE 10 ML: 5 INJECTION INTRAVENOUS at 20:04

## 2023-02-08 RX ADMIN — SENNOSIDES 8.6 MG: 8.6 TABLET, FILM COATED ORAL at 20:02

## 2023-02-08 ASSESSMENT — PAIN DESCRIPTION - DESCRIPTORS
DESCRIPTORS: ACHING;DISCOMFORT
DESCRIPTORS: ACHING;DISCOMFORT
DESCRIPTORS: ACHING
DESCRIPTORS: ACHING;DISCOMFORT

## 2023-02-08 ASSESSMENT — PAIN DESCRIPTION - LOCATION
LOCATION: HIP

## 2023-02-08 ASSESSMENT — PAIN SCALES - GENERAL
PAINLEVEL_OUTOF10: 6
PAINLEVEL_OUTOF10: 10
PAINLEVEL_OUTOF10: 7
PAINLEVEL_OUTOF10: 5
PAINLEVEL_OUTOF10: 8
PAINLEVEL_OUTOF10: 5
PAINLEVEL_OUTOF10: 8

## 2023-02-08 ASSESSMENT — PAIN DESCRIPTION - FREQUENCY
FREQUENCY: CONTINUOUS

## 2023-02-08 ASSESSMENT — PAIN DESCRIPTION - PAIN TYPE
TYPE: ACUTE PAIN

## 2023-02-08 ASSESSMENT — PAIN - FUNCTIONAL ASSESSMENT
PAIN_FUNCTIONAL_ASSESSMENT: PREVENTS OR INTERFERES SOME ACTIVE ACTIVITIES AND ADLS

## 2023-02-08 ASSESSMENT — PAIN DESCRIPTION - ONSET
ONSET: ON-GOING

## 2023-02-08 ASSESSMENT — PAIN DESCRIPTION - ORIENTATION
ORIENTATION: RIGHT

## 2023-02-08 NOTE — PROGRESS NOTES
Head to Toe Assessment    Skin  General skin appearance / Description: , Cool, Normal skin appearance  Incisions / Wounds: incision on Right thigh,clean dry and intact    Neuro/Head  LOC: A+O x x4 (with slight occasional confusion)  Eyes PERRLA 3 mm  Symmetry of face / tongue: symmetrical   JVD of neck: N/A    Chest  Heart sounds / Apical Pulse: Normal / 90  Dyspnea: with exertion   Lung sounds: Clear  Cough: Occasional    Abdomen/ Pelvis  Bowel sounds: active  Passing flatus: yes  Palpation / Inspection: soft non tender  Last BM: 2/7  Stool asses/sment: N/A  Any GI issues: N/A  Urinary issues:  Wen Present  Continence: Urinary absent  Urine color / turbidity: N/A    Extremities  Edema: N/A  Upper extremity push / pulls: Equal, weak  Left Arm Radial Pulse: present Capillary Refill: within 3 seconds  Right Arm Radial Pulse: present Capillary Refill: within 3 seconds  Lower extremity pedal push / pulls: equal, weak  Left pedal pulse: present Left posterior tibialis pulse: present Left lower extremity capillary refill: within 3 seconds  Right pedal pulse: present Right posterior tibialis pulse: present Right lower extremity capillary refill: within 3 seconds  Drift of extremities: N/A  Pain: RN notified / Taking care of    Other issues: N/A    Marianna Paulson

## 2023-02-08 NOTE — PLAN OF CARE
Problem: Discharge Planning  Goal: Discharge to home or other facility with appropriate resources  2/8/2023 0057 by Jose Cash RN  Outcome: Progressing  2/7/2023 1834 by Rachele Richards RN  Outcome: Progressing  Flowsheets (Taken 2/7/2023 1834)  Discharge to home or other facility with appropriate resources:   Identify barriers to discharge with patient and caregiver   Identify discharge learning needs (meds, wound care, etc)   Arrange for needed discharge resources and transportation as appropriate   Refer to discharge planning if patient needs post-hospital services based on physician order or complex needs related to functional status, cognitive ability or social support system  Note: Plans to go to [facility], however, possible transfer to [facility]. Problem: Safety - Adult  Goal: Free from fall injury  2/8/2023 0057 by Jose Cash RN  Outcome: Progressing  2/7/2023 1834 by Rachele Richards RN  Outcome: Progressing  Flowsheets (Taken 2/7/2023 1834)  Free From Fall Injury:   Instruct family/caregiver on patient safety   Based on caregiver fall risk screen, instruct family/caregiver to ask for assistance with transferring infant if caregiver noted to have fall risk factors  Note: No falls this shift. Problem: Skin/Tissue Integrity  Goal: Absence of new skin breakdown  Description: 1. Monitor for areas of redness and/or skin breakdown  2. Assess vascular access sites hourly  3. Every 4-6 hours minimum:  Change oxygen saturation probe site  4. Every 4-6 hours:  If on nasal continuous positive airway pressure, respiratory therapy assess nares and determine need for appliance change or resting period. 2/8/2023 0057 by Jose Cash RN  Outcome: Progressing  2/7/2023 1834 by Rachele Richards RN  Outcome: Progressing  Note: No new skin breakdown. Turning and repositioning.       Problem: Neurosensory - Adult  Goal: Achieves stable or improved neurological status  2/8/2023 0057 by Jose Cash RN  Outcome: Progressing  2/7/2023 1834 by Katia Quintanilla RN  Outcome: Progressing  Flowsheets (Taken 2/7/2023 1834)  Achieves stable or improved neurological status:   Assess for and report changes in neurological status   Initiate measures to prevent increased intracranial pressure  Note: Confused at times. Goal: Achieves maximal functionality and self care  2/8/2023 0057 by Adelso Sharma RN  Outcome: Progressing  2/7/2023 1834 by Katia Quintanilla RN  Outcome: Progressing  Flowsheets (Taken 2/7/2023 1834)  Achieves maximal functionality and self care:   Monitor swallowing and airway patency with patient fatigue and changes in neurological status   Encourage and assist patient to increase activity and self care with guidance from physical therapy/occupational therapy  Note: Working with PT and OT. Problem: Respiratory - Adult  Goal: Achieves optimal ventilation and oxygenation  2/8/2023 0057 by Adelso Sharma RN  Outcome: Progressing  2/7/2023 1834 by Katia Quintanilla RN  Outcome: Progressing  Flowsheets (Taken 2/7/2023 1834)  Achieves optimal ventilation and oxygenation:   Assess for changes in respiratory status   Assess for changes in mentation and behavior   Assess and instruct to report shortness of breath or any respiratory difficulty   Respiratory therapy support as indicated  Note: On 28% trach mask. Frequently takes her trach mask off. Problem: Cardiovascular - Adult  Goal: Maintains optimal cardiac output and hemodynamic stability  2/8/2023 0057 by Adelso Sharma RN  Outcome: Progressing  2/7/2023 1834 by Katia Quintanilla RN  Outcome: Progressing  Flowsheets (Taken 2/7/2023 1834)  Maintains optimal cardiac output and hemodynamic stability:   Monitor blood pressure and heart rate   Monitor urine output and notify Licensed Independent Practitioner for values outside of normal range  Note: Vitals stable.       Problem: Pain  Goal: Verbalizes/displays adequate comfort level or baseline comfort level  2/8/2023 0057 by Sweta Garcia RN  Outcome: Progressing  2/7/2023 1834 by Patsy Huang RN  Outcome: Progressing  Flowsheets (Taken 2/7/2023 1834)  Verbalizes/displays adequate comfort level or baseline comfort level:   Encourage patient to monitor pain and request assistance   Assess pain using appropriate pain scale  Note: Complains of pain in right leg - operative leg. Problem: Chronic Conditions and Co-morbidities  Goal: Patient's chronic conditions and co-morbidity symptoms are monitored and maintained or improved  2/8/2023 0057 by Sweta Garcia RN  Outcome: Progressing  2/7/2023 1834 by Patsy Huang RN  Outcome: Progressing  Flowsheets (Taken 2/7/2023 1834)  Care Plan - Patient's Chronic Conditions and Co-Morbidity Symptoms are Monitored and Maintained or Improved:   Monitor and assess patient's chronic conditions and comorbid symptoms for stability, deterioration, or improvement   Collaborate with multidisciplinary team to address chronic and comorbid conditions and prevent exacerbation or deterioration  Note: Monitoring chronic conditions. Problem: Genitourinary - Adult  Goal: Absence of urinary retention  2/8/2023 0057 by Sweta Garcia RN  Outcome: Progressing  2/7/2023 1834 by Patsy Huang RN  Outcome: Progressing  Flowsheets (Taken 2/7/2023 1834)  Absence of urinary retention:   Assess patients ability to void and empty bladder   Discuss catheterization for long term situations as appropriate  Note: Not to remove unless ordered by Urology. Goal: Urinary catheter remains patent  2/8/2023 0057 by Sweta Garcia RN  Outcome: Progressing  2/7/2023 1834 by Patsy Huang RN  Outcome: Progressing  Flowsheets (Taken 2/7/2023 1834)  Urinary catheter remains patent: Assess patency of urinary catheter  Note: Cleaned with CHG soap and water.       Problem: Infection - Adult  Goal: Absence of infection at discharge  2/8/2023 0057 by Sweta Garcia RN  Outcome: Progressing  2/7/2023 1834 by Patsy Huang RN  Outcome: Progressing  Flowsheets (Taken 2/7/2023 1834)  Absence of infection at discharge:   Monitor lab/diagnostic results   Assess and monitor for signs and symptoms of infection  Note: Had a UTI prior to admission. Goal: Absence of infection during hospitalization  2/8/2023 0057 by Trace Flores RN  Outcome: Progressing  2/7/2023 1834 by Malachi Contreras RN  Outcome: Progressing  Flowsheets (Taken 2/7/2023 1834)  Absence of infection during hospitalization:   Assess and monitor for signs and symptoms of infection   Monitor lab/diagnostic results   Identify and instruct in appropriate isolation precautions for identified infection/condition   Instruct and encourage patient and family to use good hand hygiene technique  Note: Washing hands and bathing in CHG soap. Problem: Nutrition Deficit:  Goal: Optimize nutritional status  2/8/2023 0057 by Trace Flores RN  Outcome: Progressing  2/7/2023 1834 by Malachi Contreras RN  Outcome: Progressing  Flowsheets (Taken 2/7/2023 1834)  Nutrient intake appropriate for improving, restoring, or maintaining nutritional needs:   Assess nutritional status and recommend course of action   Provide specific nutrition education to patient or family as appropriate  Note: Supplements as needed for optimizing healing. Problem: ABCDS Injury Assessment  Goal: Absence of physical injury  2/8/2023 0057 by Traec Flores RN  Outcome: Progressing  2/7/2023 1834 by Malachi Contreras RN  Outcome: Progressing  Flowsheets (Taken 2/7/2023 1834)  Absence of Physical Injury: Implement safety measures based on patient assessment  Note: No physical injury this shift.

## 2023-02-08 NOTE — PLAN OF CARE
Problem: Discharge Planning  Goal: Discharge to home or other facility with appropriate resources  Outcome: Progressing     Problem: Safety - Adult  Goal: Free from fall injury  Outcome: Progressing     Problem: Skin/Tissue Integrity  Goal: Absence of new skin breakdown  Description: 1. Monitor for areas of redness and/or skin breakdown  2. Assess vascular access sites hourly  3. Every 4-6 hours minimum:  Change oxygen saturation probe site  4. Every 4-6 hours:  If on nasal continuous positive airway pressure, respiratory therapy assess nares and determine need for appliance change or resting period. Outcome: Progressing     Problem: Neurosensory - Adult  Goal: Achieves stable or improved neurological status  Outcome: Progressing  Goal: Achieves maximal functionality and self care  Outcome: Progressing     Problem: Respiratory - Adult  Goal: Achieves optimal ventilation and oxygenation  Outcome: Progressing     Problem: Cardiovascular - Adult  Goal: Maintains optimal cardiac output and hemodynamic stability  Outcome: Progressing     Problem: Genitourinary - Adult  Goal: Absence of urinary retention  Outcome: Progressing  Goal: Urinary catheter remains patent  Outcome: Progressing     Problem: Infection - Adult  Goal: Absence of infection at discharge  Outcome: Progressing  Goal: Absence of infection during hospitalization  Outcome: Progressing     Problem: Pain  Goal: Verbalizes/displays adequate comfort level or baseline comfort level  Outcome: Progressing     Problem: Chronic Conditions and Co-morbidities  Goal: Patient's chronic conditions and co-morbidity symptoms are monitored and maintained or improved  Outcome: Progressing     Problem: Nutrition Deficit:  Goal: Optimize nutritional status  Outcome: Progressing     Problem: ABCDS Injury Assessment  Goal: Absence of physical injury  Outcome: Progressing     Care plan reviewed with patient.   Patient verbalizes understanding of the plan of care and contributes to goal setting.

## 2023-02-08 NOTE — PROGRESS NOTES
Spoke with primary nurse regarding referral to [facility] and fact that [address] [place] [address] has denied transfer to their facility. Will continue to follow patient clinical course including progress with therapy in order to make further recommendations.

## 2023-02-08 NOTE — PROGRESS NOTES
No changes noted with Head to Toe assessment  Handoff given to RN Claudell Pomfret [facility] SURGICAL [place]

## 2023-02-08 NOTE — PROGRESS NOTES
Hospitalist Progress Note      Patient:  Uyen Myers    Unit/Bed:4K-24/024-A  YOB: 1950  MRN: [id number]   Acct: [de-identified]   PCP: Rome Gr MD  Date of Admission: 2/1/2023    Assessment/Plan:    - Acute right intertrochanteric femur fracture    -S/p R cephalomedullary nail; WBAT/AAT; PT/OT to see      - Supra therapeutic INR  - resolved  -back on DOAC today  -  - Acute PE  - CTA on 1/21/2023 confirming b/l non-obstructive PEs   Pt on R/a    - Acute on chronic normo/macrocytic anemia: likely 2/2 acute blood loss 2/2 fracture and ORIF; s/p PRBC; CT A/P (w/o woud've been sufficient) negative for any retroperitoneal bleed (given pt on [address] w/ recent fall); no clinically evident bleed otherwise; HD stable. VSS. Anemia w/u c/w ALEXANDRA and mild folic deificiency; will replace. Monitor Hb. PCP to consider GI referral for bidirectional endoscopy as warranted. - Hx of CAD  -Stable. Continue ASA, BB, statins    - Hx of DM2              on home insulin regimen w/ SSI w/ BS well-controlled. CCM. diabetic diet, SSI, accuchecks, and hypoglycemia orders in place, A1C 5.5    - Chronic Respiratory failure  -Pt has Hx of tracheostomy and tracheal stenosis  -O2 at baseline, continue O2 via trach mask.  -Sputum Cx positive for pseudomonas. Suspect colonized. No Abx warranted. d/c'ed Cipro. - Query Hx of HTN: pt on BB only w/ BP well-controlled. CCM and monitor    - Hx of chronic G1DD: clinically euvolemic. On furosemide 20 mg QD. CCM. I/O, daily wt, placed salt (2 gr) and fluid (2L) restriction. On BB    - Acute Delirium:  Likely multifactorial 2/2 post-op Hip#, pain, acute anemia, etc.  recommend judicious use of narcotics, beznos, antihistamines/antiememtics/Ach post-op. No need any further W/U at this time. - Obesity w/ BMI 38.41    - Multiple lumbar Compression fractures  -MRI ordered showing acute fractures of L2-S1 likely fragility 2/2 osteoporosis. 25(OH) vit D sent; No hyperthyroidism; pt will benefit from anti-resorptive Tx  when acute hip# resolved. MR also c/w multilevel degenerative lumbar spinal stenosis; no QES. Recommend OP Ortho/Spine F/U.  - Slightly elevated TSH w/ NL free T4: w/ no clinical finding suggestive of hypothyroidism;  this could represent SES; recommend repeat TSH in 4 wks time and F/U      - Constipation (resolved)  No concerns for obstruction; ileus  -On standing and prn laxatives. - Dispo  Noted previous plan for a lateral transfer to [address] [place] [address] [facility]; however; clarified and confirmed per d/w Thuy Catalan declined transfer in absence of available bed and lateral nature of transfer; d/w pt who no longer wishes a transfer; also d/w family and updated them re: status of transfer; discussed no obviousl clinical reasons for transfer; however, happy to arrange a transfer after their discussion w/ pt regarding desire for transfer and if so the locaion/hospital.    Otherwise, PT/OT to see     Chief Complaint: fall and R hip#     Initial H and P:-    Admitted for management of R PFF; post-op course c/b acute on chronic anemia and PE. I took over care on 2/8/2023. Subjective (past 24 hours):   Denies CP/SOB/fever/chills/cough/sputum/presyncope/palpitation/GI or  symptoms. Denies any new complaints. Feeling improved overall. Past medical history, family history, social history and allergies reviewed again and is unchanged since admission. ROS (All review of systems completed. Pertinent positives noted.  Otherwise All other systems reviewed and negative.)     Medications:  Reviewed    Infusion Medications    sodium chloride      sodium chloride      dextrose      sodium chloride      sodium chloride       Scheduled Medications    folic acid  1 mg Oral Daily    metoprolol succinate  25 mg Oral Daily    polyethylene glycol  17 g Oral BID    lidocaine  2 patch TransDERmal Daily    insulin lispro  5 Units SubCUTAneous TID WC    insulin glargine  28 Units SubCUTAneous Nightly    insulin glargine  13 Units SubCUTAneous Daily with breakfast    QUEtiapine  25 mg Oral BID    aspirin  81 mg Oral Daily    rivaroxaban  15 mg Oral BID WC    rosuvastatin  10 mg Oral Daily    senna  1 tablet Oral Nightly    sodium chloride flush  5-40 mL IntraVENous 2 times per day    insulin lispro  0-8 Units SubCUTAneous TID WC    insulin lispro  0-4 Units SubCUTAneous Nightly    furosemide  20 mg Oral Daily     PRN Meds: sodium chloride, sodium chloride, glucose, dextrose bolus **OR** dextrose bolus, glucagon (rDNA), dextrose, sodium chloride, albuterol sulfate HFA, Menthol, guaiFENesin, sodium chloride flush, sodium chloride, acetaminophen **OR** acetaminophen, potassium chloride **OR** potassium alternative oral replacement **OR** potassium chloride, magnesium sulfate, oxyCODONE **OR** [DISCONTINUED] oxyCODONE, hydrOXYzine      Intake/Output Summary (Last 24 hours) at 2/8/2023 0986  Last data filed at 2/8/2023 0456  Gross per 24 hour   Intake 1228 ml   Output 500 ml   Net 728 ml       Diet:  ADULT DIET; Regular; 4 carb choices (60 gm/meal)  ADULT ORAL NUTRITION SUPPLEMENT; Breakfast, Dinner; Wound Healing Oral Supplement    Physical Exam:  /62   Pulse 99   Temp 99 °F (37.2 °C) (Oral)   Resp 18   Ht 5' 2\" (1.575 m)   Wt 215 lb 14.4 oz (97.9 kg)   LMP  (LMP Unknown)   SpO2 98%   BMI 39.49 kg/m²   General appearance: Alert and oriented X3. Alert and awake w/o confusion or agitation. No apparent distress. HEENT: Pupils equal, round, and reactive to light. Conjunctivae/corneas clear. Neck: Supple, with full range of motion. No jugular venous distention. Trachea midline. Respiratory:  Normal respiratory effort. Clear to auscultation, bilaterally without Rales/Wheezes/Rhonchi. Cardiovascular: Regular rate and rhythm with normal S1/S2 without murmurs, rubs or gallops. Abdomen: Soft, non-tender, non-distended with normal bowel sounds.   Musculoskeletal: passive and active ROM x 4 extremities. R hip dressing C/DI  Skin: trace bilat edema  Neurologic:  Neurovascularly intact without any focal sensory/motor deficits. Cranial nerves: II-XII intact, grossly non-focal.  Psychiatric: Alert and oriented, thought content appropriate, normal insight  Capillary Refill: Brisk,< 3 seconds   Peripheral Pulses: +2 palpable, equal bilaterally     Labs:   Recent Labs     02/06/23  0540 02/06/23  1915 02/07/23  0602 02/07/23  2050   WBC 10.6  --  10.0  --    HGB 5.1* 6.5* 6.4* 9.1*   HCT 16.9* 20.7* 20.0* 28.5*     --  236  --      Recent Labs     02/06/23  0434 02/07/23  0602    137   K 4.8 4.2    103   CO2 27 28   BUN 22 18   CREATININE 0.7 0.7   CALCIUM 8.2* 8.3*     Recent Labs     02/06/23  1330   BILITOT 0.5     No results for input(s): INR in the last 72 hours. No results for input(s): Lavonna Debbie in the last 72 hours. Microbiology:    Blood culture #1:   Lab Results   Component Value Date/Time    BC No growth-preliminary 01/02/2022 03:29 PM    BC  01/02/2022 03:29 PM     possible contamination; clinical correlation required    BC No growth-preliminary No growth 01/02/2022 03:29 PM       Blood culture #2:No results found for: [facility]    Organism:  Lab Results   Component Value Date/Time    ORG Pseudomonas aeruginosa 02/02/2023 03:55 PM    ORG Stenotrophomonas maltophilia 02/02/2023 03:55 PM         Lab Results   Component Value Date/Time    LABGRAM  02/02/2023 03:55 PM     Moderate segmented neutrophils observed. Rare epithelial cells observed. Moderate gram negative bacilli. MRSA culture only:No results found for: [facility]    Urine culture:   Lab Results   Component Value Date/Time    LABURIN  11/17/2022 04:25 PM     No growth-preliminary Mixed growth.   The mixture of organisms present represents both organisms that may cause urinary tract infections and organisms that are not a common cause of urinary tract infections and are possibly skin john or distal urethral john. Respiratory culture: No results found for: CULTRESP    Aerobic and Anaerobic :  Lab Results   Component Value Date/Time    LABAERO No Acinetobacter species isolated. 02/02/2023 10:00 AM     No results found for: LABANAE    Urinalysis:      Lab Results   Component Value Date/Time    NITRU NEGATIVE 02/02/2023 05:10 PM    WBCUA 25-50 02/02/2023 02:10 PM    BACTERIA FEW 02/02/2023 02:10 PM    RBCUA 5-10 02/02/2023 02:10 PM    BLOODU NEGATIVE 02/02/2023 05:10 PM    SPECGRAV >1.030 02/02/2023 02:10 PM    GLUCOSEU NEGATIVE 02/02/2023 05:10 PM       Radiology:  MRI LUMBAR SPINE WO CONTRAST   Final Result       1. Acute fractures with fluid clefts associated with the endplates at the N6-Z5 levels. The height loss of each vertebral body is mild. These are most likely pathologic secondary to underlying osteoporosis. 2. 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 **This report has been created using voice recognition software. It may contain minor errors which are inherent in voice recognition technology. **      Final report electronically signed by Dr. Leonor Guadalupe on 2/6/2023 3:20 PM      XR LUMBAR SPINE (2-3 VIEWS)   Final Result   1. Slight thoracolumbar levoscoliosis. Cannot exclude muscle spasm right side. 2. Moderate vertebral body spondylosis scattered in the lumbar and lower thoracic spine. Moderate degenerative facet arthropathy involving at least the lower 3 lumbar levels. 3. Diffuse demineralization of the bones, consistent with osteoporosis. Mild superior endplate depression fractures L2, L3, and L4 of questionable acuity but none of these were present on the prior CT abdomen dated 1/10/2022. These were all present, in    retrospect, on and abdominal film dated 1/25/2023. 4. MRI lumbar spine would be helpful for further evaluation. Patient may be a good candidate for vertebroplasty.  If so, this can be arranged through the interventional scheduling desk of [facility] radiology department (extension [phone]). **This report has been created using voice recognition software. It may contain minor errors which are inherent in voice recognition technology. **      Final report electronically signed by Dr. Toño Winchester on 2/6/2023 8:23 AM      XR FEMUR RIGHT (MIN 2 VIEWS)   Final Result   Intraoperative imaging demonstrating open reduction internal fixation right hip            **This report has been created using voice recognition software. It may contain minor errors which are inherent in voice recognition technology. **      Final report electronically signed by Dr. Ruby Vargas on 2/3/2023 3:02 PM      FLUORO FOR SURGICAL PROCEDURES   Final Result      XR CHEST PORTABLE   Final Result   1. Marked cardiomegaly. Right arm PICC line, catheter tip at cavoatrial projection. . Tracheostomy tube in place. 2. No acute findings. No infiltrates or effusions are seen. **This report has been created using voice recognition software. It may contain minor errors which are inherent in voice recognition technology. **      Final report electronically signed by Dr. Toño Winchester on 2/2/2023 2:20 PM      XR ABDOMEN (KUB) (SINGLE AP VIEW)   Final Result   Impression:    Resolved constipation. Right femoral intertrochanteric fracture. This document has been electronically signed by: Vivian Vaca on    02/02/2023 05:35 AM      CT CERVICAL SPINE WO CONTRAST   Final Result    No fracture. **This report has been created using voice recognition software. It may contain minor errors which are inherent in voice recognition technology. **      Final report electronically signed by Dr. Ankit Ellsworth on 2/2/2023 7:38 AM      CT HEAD WO CONTRAST   Final Result    No evidence of an acute process. **This report has been created using voice recognition software.  It may contain minor errors which are inherent in voice recognition technology. **      Final report electronically signed by Dr. Regla Hayes on 2/2/2023 7:25 AM      XR HIP 2-3 VW W PELVIS RIGHT   Final Result   Impression:   Acute right femoral intertrochanteric fracture, with subtrochanteric    extension. This document has been electronically signed by: Milton Corbin MD on    02/02/2023 05:01 AM      XR CHEST 1 VIEW   Final Result   Impression:   No acute cardiopulmonary disease. Cardiomegaly. This document has been electronically signed by: Milton Corbin MD on    02/02/2023 05:02 AM      CTA CHEST W WO CONTRAST   Final Result   Impression:   1. Stable bilateral nonocclusive pulmonary emboli. Negative for CT    evidence of right heart strain or thrombus propagation. No new pulmonary    emboli since the prior study 1/21/23. 2. New trace bilateral pleural effusions. 3. Nonemergent/incidental findings as above. This document has been electronically signed by: Milton Corbin MD on    02/02/2023 02:13 AM      All CTs at this facility use dose modulation techniques and iterative    reconstructions, and/or weight-based dosing   when appropriate to reduce radiation to a low as reasonably achievable. 3D Post-processing was performed on this study. CT ABDOMEN PELVIS WO CONTRAST Additional Contrast? None    (Results Pending)     XR ABDOMEN (KUB) (SINGLE AP VIEW)    Result Date: 2/2/2023  Supine AP abdomen. Comparison: 1/25/2023 Findings: Significant less fecal contents within the colon. Nonobstructed bowel gas pattern. No free intraperitoneal air. Moderate bladder distention with excreted contrast. Minimally displaced comminuted right lower intertrochanteric fracture with subtrochanteric extension. Impression:  Resolved constipation. Right femoral intertrochanteric fracture. This document has been electronically signed by: Jessica Solares.  Raj Correa on 02/02/2023 05:35 AM    CT HEAD WO CONTRAST    Result Date: 2/2/2023  PROCEDURE: CT HEAD WO CONTRAST CLINICAL INFORMATION: Slip and fall, pain. COMPARISON: No prior study. TECHNIQUE: Noncontrast 5 mm axial images were obtained through the brain. Sagittal and coronal reconstructions were obtained. All CT scans at this facility use dose modulation, iterative reconstruction, and/or weight-based dosing when appropriate to reduce radiation dose to as low as reasonably achievable. FINDINGS: The brain volume is within acceptable limits. There is no hemorrhage. There are no intra-or extra-axial collections. There is no hydrocephalus, midline shift or mass effect. There is a moderate amount of abnormal low attenuation in the white matter the brain suggesting chronic small vessel ischemic changes. The paranasal sinuses and mastoid air cells are normally aerated. There is no suspicious calvarial abnormality. No evidence of an acute process. **This report has been created using voice recognition software. It may contain minor errors which are inherent in voice recognition technology. ** Final report electronically signed by Dr. Bud Hernandez on 2/2/2023 7:25 AM    CTA CHEST W WO CONTRAST    Result Date: 2/2/2023  CTA Chest with contrast: PE Protocol. Indication: Shortness of breath. Recent PE. Technique: CTA chest with intravenous contrast. Coronal and sagittal reformations. MIP images. Comparison: CT/KO/SR - CTA CHEST W WO CONTRAST - 01/21/2023 09:34 AM EST Findings: Tracheostomy tube 4.8 cm above the telma. Right PICC line with tip in the lower SVC, new since prior study. Removal of the left PICC line. The intravenous contrast bolus adequately opacifies pulmonary arterial vasculature. Nonocclusive intraluminal filling defects in the right lower lobe segmental/subsegmental branches of the pulmonary artery (series 601 images ) as well as left lower lobe branch of the pulmonary artery (series 601 image [id number]), similar to previous study. No new areas of pulmonary embolus. New trace bilateral pleural effusions. . Mild interstitial thickening, similar to previous study. Stable right lower lobe nodule up to 0.5 cm. Respiratory motion artifacts and atelectasis. No bulky mediastinal, hilar, or axillary lymphadenopathy. Heart is enlarged in size. Stent versus calcification LAD. Coronary atherosclerotic calcifications. Trace pericardial effusion. Lipomatous hypertrophy of the interatrial septum. The thoracic aorta is normal in caliber, with mild atherosclerotic calcifications. Small hiatal hernia. Partially imaged upper abdomen: No upper abdominal ascites. Bones: Spondylosis. Stable sclerosis of the posterior left 9th rib. T12 and L1 vertebral hemangiomata. Impression: 1. Stable bilateral nonocclusive pulmonary emboli. Negative for CT evidence of right heart strain or thrombus propagation. No new pulmonary emboli since the prior study 1/21/23. 2. New trace bilateral pleural effusions. 3. Nonemergent/incidental findings as above. This document has been electronically signed by: Eloise Abdi MD on 02/02/2023 02:13 AM All CTs at this facility use dose modulation techniques and iterative reconstructions, and/or weight-based dosing when appropriate to reduce radiation to a low as reasonably achievable. 3D Post-processing was performed on this study. CT CERVICAL SPINE WO CONTRAST    Result Date: 2/2/2023  PROCEDURE: CT CERVICAL SPINE WO CONTRAST CLINICAL INFORMATION: Slip and fall, pain. COMPARISON: No prior study. TECHNIQUE: 3 mm noncontrast axial images were obtained through the cervical spine with sagittal and coronal reconstructions. All CT scans at this facility use dose modulation, iterative reconstruction, and/or weight-based dosing when appropriate to reduce radiation dose to as low as reasonably achievable. FINDINGS: There is osseous demineralization. The cervical vertebral bodies are normally aligned. There is no fracture. There is no prevertebral soft tissue swelling.   There are some scattered degenerative changes. There is no significant spinal canal stenosis. No suspicious osseous lesions are present. There are no suspicious findings in the cervical soft tissues. There are no suspicious findings in the lung apices. There is a tracheostomy tube. No fracture. **This report has been created using voice recognition software. It may contain minor errors which are inherent in voice recognition technology. ** Final report electronically signed by Dr. Kory Dodge on 2/2/2023 7:38 AM    XR CHEST PORTABLE    Result Date: 2/2/2023  PROCEDURE: XR CHEST PORTABLE CLINICAL INFORMATION: post PICC placement COMPARISON: Earlier film same date, 0426 hours. TECHNIQUE: A single mobile view of the chest was obtained. 1. Marked cardiomegaly. Right arm PICC line, catheter tip at cavoatrial projection. . Tracheostomy tube in place. 2. No acute findings. No infiltrates or effusions are seen. **This report has been created using voice recognition software. It may contain minor errors which are inherent in voice recognition technology. ** Final report electronically signed by Dr. Maggie Oquendo on 2/2/2023 2:20 PM    XR CHEST 1 VIEW    Result Date: 2/2/2023  Chest X-ray: 1 view. Indication: Slip and fall. Comparison: CT/SR - CTA CHEST W WO CONTRAST - 02/02/2023 12:59 AM EST Findings: Tracheostomy tube in position. Right PICC line, with tip obscured by the overlapping thoracic spine. No pneumothorax. No moderate or large pleural effusion. Mild low lung volumes. No pulmonary edema. The cardiac silhouette is enlarged in size. Bony thorax is grossly intact. Spondylosis. Mild calcific tendinosis right glenohumeral joint. Degenerative osteoarthritis acromioclavicular joints. Impression: No acute cardiopulmonary disease. Cardiomegaly.  This document has been electronically signed by: Mandeep Singh MD on 02/02/2023 05:02 AM    XR HIP 2-3 VW W PELVIS RIGHT    Result Date: 2/2/2023  Right hip x-ray: 2 views with AP pelvis. Indication: Slip and fall. Comparison: None Findings: Acute right femoral intertrochanteric fracture, with subtrochanteric extension. Normal alignment of the right femoroacetabular joint. Bony demineralization. Normal alignment of the bilateral sacroiliac and left hip joints. Lumbar spondylosis. Excreted contrast in the urinary bladder. Peripheral arterial vascular calcifications. Impression: Acute right femoral intertrochanteric fracture, with subtrochanteric extension. This document has been electronically signed by: Barry Cervantes MD on 02/02/2023 05:01 AM    With RN in room, patient,  and two daughters were updated about and agreed upon the treatment plan, all the questions and concerns were addressed. About 60 minutes of time spent on counseling; family meeting and talking to [facility] in addition to [address] [place] [address] Avenue.      Electronically signed by Estelle Louis MD on 2/8/2023 at 9:51 AM

## 2023-02-08 NOTE — PROGRESS NOTES
[address] [facility] PHYSICAL THERAPY  DAILY NOTE  STRZ ICU STEPDOWN TELEMETRY 4K - 1B-95/806-U    Time In: 1450  Time Out: 1528  Timed Code Treatment Minutes: 38 Minutes  Minutes: 38          Date: 2023  Patient Name: Efrain Szymanski,  Gender:  female        MRN: [id number]  : 1950  (81 y.o.)     Referring Practitioner: WYATT Ayala CNP  Diagnosis: acute diastolic heart failure  Additional Pertinent Hx: Abi Webber is a 73yoF with PMH of recent PE and b/l DVT, macrocytic anemia, CAD, DM2, tracheostomy dependent, obesity, and HTN who presents for leg swelling, nausea, and constipation. She was recently discharged after an 8 day admission for DVTs and PEs. She was placed on Xarelto when discharged and stated that she has been compliant and only missed one dose. She returned due to chronic constipation, feeling ''bloated'', nausea, and LE edema. While being evaluated in the ED the pt had a mechanical fall and developed a R femur fracture. She was found to have supratheraputic INR at 3.83. CT head and C-spine both clear. Pt is s/p R femur ORIF by Dr Melissa Hutton . Pt found to have acute fractures of L2-S1 likely 2/2 osteoporosis, ok for activity as tolerated per Dr Chetna Cueto . Prior Level of Function:  Lives With: Spouse  Type of Home: House  Home Layout: One level  Home Access: Ramped entrance  Home Equipment: emil Hamilton (BSC)   Bathroom Shower/Tub: Walk-in shower  Bathroom Toilet: Standard  Bathroom Equipment: Built-in shower seat    ADL Assistance: Needs assistance  Homemaking Assistance: Needs assistance  Ambulation Assistance: Independent  Transfer Assistance: Independent  Active : No  Additional Comments: pt amb short distances in the home with RW,  able to assist as needed.     Restrictions/Precautions:  Restrictions/Precautions: General Precautions, Fall Risk, Weight Bearing  Right Lower Extremity Weight Bearing: Weight Bearing As Tolerated  Position Activity Restriction  Other position/activity restrictions: tracheostomy/collar with moist air, 1-2 L at baseline     SUBJECTIVE: RN approved session, pt is supine in bed, sleepy, multiple cues to keep eyes open. Encouragement from PT and family for OOB activity, able to complete transfer to recliner. PAIN: 10/10: R hip, received pain meds ~1.5 hours prior to tx. Vitals: Oxygen: 5 L via trach mask    OBJECTIVE:  Bed Mobility:  Supine to Sit: Maximum Assistance, X 2, with head of bed raised, with rail, with verbal cues , with increased time for completion  Scooting: Maximum Assistance, X 1    Transfers:  Sit to Stand: Moderate Assistance, X 2, with increased time for completion, with verbal cues  Stand to Sit:Moderate Assistance, X 2  To/From Bed and Chair: with Lizzy Downey  **Pt transfers sit to stand first trial with RW, increased trunk flexion and forward lean on walker; unable to stand fully upright to weight shift; transfer to recliner completed with ojse haider    Balance:  Static Sitting Balance:  Contact Guard Assistance, X 2, with verbal cues; encouragement for continued mobility, pt c/o increased fatigue and pain  Static Standing Balance: Moderate Assistance, X 2    Exercise:  Patient was guided in 1 set(s) 10 reps of exercise to both lower extremities, max A through limited ROM to R LE, mod A for L LE. Ankle pumps, Heelslides, and Hip abduction/adduction. Exercises were completed for increased independence with functional mobility. Functional Outcome Measures: Completed  AM-PAC Inpatient Mobility without Stair Climbing Raw Score : 6  AM-PAC Inpatient without Stair Climbing T-Scale Score : 26.48    ASSESSMENT:  Assessment: Patient progressing toward established goals. Activity Tolerance:  Patient tolerance of  treatment: good.       Equipment Recommendations:Equipment Needed: No  Discharge Recommendations: Inpatient Therapy Stay  Plan: Current Treatment Recommendations: Strengthening, Balance training, Functional mobility training, Neuromuscular re-education, Transfer training, Endurance training, Equipment evaluation, education, & procurement, Patient/Caregiver education & training, Safety education & training, Therapeutic activities, Home exercise program  General Plan:  (6x O)    Patient Education  Patient Education: Precautions/Restrictions, Bed Mobility, Transfers    Goals:  Patient Goals : rehab and then home with   Short Term Goals  Time Frame for Short Term Goals: by discharge  Short Term Goal 1: Pt will demo rolling L and R in bed with CGA to improve positioning. Short Term Goal 2: Pt will demo supine to/from sit transfers with mod A x1 with modifications as needed to progress with mobility. Short Term Goal 3: Pt will tolerate 10 min sitting on EOB wtih S to complete functional tasks and progress with mobility. Short Term Goal 4: Pt will tolerate 10-20 reps of ther ex to increase overall mobility. Short Term Goal 5: Pt to transfer sit <--> stand min A for increased functional mobility. Long Term Goals  Time Frame for Long Term Goals : NA due to short ELOS    Following session, patient left in safe position with all fall risk precautions in place.

## 2023-02-08 NOTE — CARE COORDINATION
Collaborative Discharge Planning    Link Carrier  :  1950  MRN:  [id number]    ADMIT DATE:  2023      Discharge Planning Discharge Planning  Type of Residence: House  Living Arrangements: Spouse/Significant Other  Support Systems: Spouse/Significant Other  Current Services Prior To Admission: [facility] Jimenez  Current DME Prior to Arrival: Avis Villareal, Wheelchair  Potential Assistance Needed: [address], Santiago Jimenez  DME Ordered?: No  Potential Assistance Purchasing Medications: No  Type of Home Care Services: Nursing Services, OT, PT, Skilled Therapy, [address]  Patient expects to be discharged to[de-identified] Rehabilitation facility  Follow Up Appointment: Best Day/Time :  (PM)  One/Two Story Residence: One story  White Board Notes /Social Work Whiteboard Notes  /Social Work Whiteboard:  CM/SW: [facility] when medically stable. No precert. Current [facility] (RN & PT). [employer] home O2 2-3L/trach/suction/PICC/nebulizer/WC/PMV. New CHF Clinic. Discharge Plan Home with home health  Lives w spouse Neetu Urrutia; has [facility] (nsg, therapy); prefers [facility] ([place] [facility] backup plan) for therapy/rehab needs (eval pending, Physiatry following); has [employer] home oxygen 0.25L, trach/suction/nebulizer supplies; PICC PTA; has walker, APOLONIA; plans new CHF Clinic    Update: family requested [facility] for \"answers\", further workup, Anemia w unknown etiology; [facility] declined r/t capacity; [address] will check w Attending if another tertiary care referral planned    Discharge Milestones and Delays: Clinical status    CHF  History: Tracheostomy, DM, BLE DVT, Pulmonary Emboli     2/2 Fall w Right Femur Fracture - ED     2/3 Right Femur Fixation surgery     MRI Spine  1. Acute fractures with fluid clefts associated with the endplates at the M8-E2 levels. The height loss of each vertebral body is mild. These are most likely pathologic secondary to underlying osteoporosis.    2. 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      Pseudomonas Aeruginosa, Stenotrophomonas Maltophilia Respiratory Culture    TM 28% FIO2     H 6.4; blood transfusion planned    [facility] declined r/t capacity; [address] will call Attending if they would like to try other tertiary care referral      SIGNED:  Lashell Cronin RN   2/8/2023, 10:12 AM

## 2023-02-08 NOTE — PROGRESS NOTES
Spoke to Primary RN Carmen Mendoza who states that the patient family is looking to have her transferred to another hospital.      The patient was declined by [facility] for transfer earlier today.

## 2023-02-08 NOTE — PROGRESS NOTES
[facility]  Occupational Therapy  Daily Note  Time:   Time In: 8  Time Out: 5067  Timed Code Treatment Minutes: 31 Minutes  Minutes: 31          Date: 2023  Patient Name: Viviana Moon,   Gender: female      Room: [facility]24/024-A  MRN: [id number]  : 1950  (81 y.o.)  Referring Practitioner: WYATT Quiroz CNP  Diagnosis: acute dystolic heart failure  Additional Pertinent Hx: Per ER note on 2023:72 y.o. female who presents for increasing leg swelling. Patient was just discharged from the hospital on 2023. She was discovered to have bilateral DVTs and PEs. She is supposed to be on rivaroxaban. She states she is taking her medication. She called the home health nurse tonight who instructed her to come in for evaluation and treatment. Swelling has been ongoing for quite some time. Pt sustained a mechanical fall while in the ED pending admission for BLE swelling, n/v. Immediate right sided hip pain, imaging revealed right femur fracture,   s/p Open treatment right intertrochanteric femur fracture the cephalomedullary nail on 2/3/2023. Restrictions/Precautions:  Restrictions/Precautions: General Precautions, Fall Risk, Weight Bearing  Right Lower Extremity Weight Bearing: Weight Bearing As Tolerated  Position Activity Restriction  Other position/activity restrictions: tracheostomy/collar with moist air, 1-2 L at baseline     SUBJECTIVE: Pt resting in bed upon arrival, min increased time for arousal and much encouragement for pt to be agreeable to OT session.     PAIN: Did not rate: RLE- pt is very guarded throughout session    Vitals: Vitals not assessed per clinical judgement, see nursing flowsheet  Pt is chronic trach mask    COGNITION: Slow Processing, Decreased Recall, Decreased Insight, Impaired Memory, Decreased Problem Solving, Decreased Safety Awareness, Impaired Attention, Tangential, and Pt is very confused this session stating she feels like she's been in [place] all morning. ADL:   No ADL's completed this session. BALANCE:  Sitting Balance:  Minimal Assistance, X 1. Pt seated EOB~5 minutes this session with frequent encouragement required throughout to continue sitting trial. Pt then stating, \"I'm going to poop any second! \" Becoming very anxious and adamant to return to supine for bed pan use. Pt then passing gas only and further declining activity. Standing Balance:  Not Tested . Pt declining despite much encouragement. BED MOBILITY:  Rolling to Left: Maximum Assistance, X 1    Supine to Sit: Maximum Assistance, X 1    Sit to Supine: Maximum Assistance, X 1    Scooting: Maximum Assistance, X 1 EOB    TRANSFERS:  Not Tested this session. FUNCTIONAL MOBILITY:  OTR to further assess. ADDITIONAL ACTIVITIES:  Pt declined further activity    ASSESSMENT:     Activity Tolerance:  Patient tolerance of  treatment: fair. Discharge Recommendations: Patient would benefit from continued OT at discharge and Inpatient Therapy Stay  Equipment Recommendations: Other: Monitor pending progress  Plan: Times Per Week: 5x  Current Treatment Recommendations: Balance training, Functional mobility training, Endurance training, Self-Care / ADL, Safety education & training, Patient/Caregiver education & training, Strengthening    Patient Education  Patient Education: Role of OT, Plan of Care, Importance of Increasing Activity, and Safety with bed mobility and sitting balance.     Goals  Short Term Goals  Time Frame for Short Term Goals: until discharge  Short Term Goal 1: Pt will tolerate 8-10 min EOB ADL task with S for balance  Short Term Goal 2: Pt will complete sit-stand t/fs with mod A x 1 for eventual BSC t/fs  Short Term Goal 3: Pt will tolerate standing 1 min with min A for increased ease of toileting routine  Short Term Goal 4: Pt will tolerate further assessment of functional t/fs by OTR when appropriate  Long Term Goals  Time Frame for Long Term Goals : No LTG set d/t short ELOS    Following session, patient left in safe position with all fall risk precautions in place.

## 2023-02-09 LAB
25(OH)D3 SERPL-MCNC: 18 NG/ML (ref 30–100)
ANION GAP SERPL CALC-SCNC: 9 MEQ/L (ref 8–16)
BASOPHILS ABSOLUTE: 0.1 THOU/MM3 (ref 0–0.1)
BASOPHILS NFR BLD AUTO: 0.6 %
BUN SERPL-MCNC: 16 MG/DL (ref 7–22)
CALCIUM SERPL-MCNC: 8 MG/DL (ref 8.5–10.5)
CHLORIDE SERPL-SCNC: 104 MEQ/L (ref 98–111)
CO2 SERPL-SCNC: 26 MEQ/L (ref 23–33)
CREAT SERPL-MCNC: 0.7 MG/DL (ref 0.4–1.2)
DEPRECATED RDW RBC AUTO: 62.4 FL (ref 35–45)
EOSINOPHIL NFR BLD AUTO: 0.9 %
EOSINOPHILS ABSOLUTE: 0.1 THOU/MM3 (ref 0–0.4)
ERYTHROCYTE [DISTWIDTH] IN BLOOD BY AUTOMATED COUNT: 18.6 % (ref 11.5–14.5)
GFR SERPL CREATININE-BSD FRML MDRD: > 60 ML/MIN/1.73M2
GLUCOSE BLD STRIP.AUTO-MCNC: 129 MG/DL (ref 70–108)
GLUCOSE BLD STRIP.AUTO-MCNC: 131 MG/DL (ref 70–108)
GLUCOSE BLD STRIP.AUTO-MCNC: 164 MG/DL (ref 70–108)
GLUCOSE BLD STRIP.AUTO-MCNC: 94 MG/DL (ref 70–108)
GLUCOSE SERPL-MCNC: 174 MG/DL (ref 70–108)
HCT VFR BLD AUTO: 28 % (ref 37–47)
HCT VFR BLD AUTO: 28.5 % (ref 37–47)
HGB BLD-MCNC: 8.7 GM/DL (ref 12–16)
HGB BLD-MCNC: 8.9 GM/DL (ref 12–16)
IMM GRANULOCYTES # BLD AUTO: 0.4 THOU/MM3 (ref 0–0.07)
IMM GRANULOCYTES NFR BLD AUTO: 4.5 %
INR PPP: 2.88 (ref 0.85–1.13)
LYMPHOCYTES ABSOLUTE: 1.5 THOU/MM3 (ref 1–4.8)
LYMPHOCYTES NFR BLD AUTO: 16.9 %
MCH RBC QN AUTO: 29.5 PG (ref 26–33)
MCHC RBC AUTO-ENTMCNC: 31.1 GM/DL (ref 32.2–35.5)
MCV RBC AUTO: 94.9 FL (ref 81–99)
MONOCYTES ABSOLUTE: 0.9 THOU/MM3 (ref 0.4–1.3)
MONOCYTES NFR BLD AUTO: 10 %
NEUTROPHILS NFR BLD AUTO: 67.1 %
NRBC BLD AUTO-RTO: 1 /100 WBC
PLATELET # BLD AUTO: 215 THOU/MM3 (ref 130–400)
PMV BLD AUTO: 9.8 FL (ref 9.4–12.4)
POTASSIUM SERPL-SCNC: 4 MEQ/L (ref 3.5–5.2)
RBC # BLD AUTO: 2.95 MILL/MM3 (ref 4.2–5.4)
SEGMENTED NEUTROPHILS ABSOLUTE COUNT: 6 THOU/MM3 (ref 1.8–7.7)
SODIUM SERPL-SCNC: 139 MEQ/L (ref 135–145)
WBC # BLD AUTO: 8.9 THOU/MM3 (ref 4.8–10.8)

## 2023-02-09 PROCEDURE — 97110 THERAPEUTIC EXERCISES: CPT

## 2023-02-09 PROCEDURE — 82306 VITAMIN D 25 HYDROXY: CPT

## 2023-02-09 PROCEDURE — 6370000000 HC RX 637 (ALT 250 FOR IP): Performed by: NURSE PRACTITIONER

## 2023-02-09 PROCEDURE — 85018 HEMOGLOBIN: CPT

## 2023-02-09 PROCEDURE — 2700000000 HC OXYGEN THERAPY PER DAY

## 2023-02-09 PROCEDURE — 36415 COLL VENOUS BLD VENIPUNCTURE: CPT

## 2023-02-09 PROCEDURE — 99233 SBSQ HOSP IP/OBS HIGH 50: CPT | Performed by: HOSPITALIST

## 2023-02-09 PROCEDURE — 94761 N-INVAS EAR/PLS OXIMETRY MLT: CPT

## 2023-02-09 PROCEDURE — 2060000000 HC ICU INTERMEDIATE R&B

## 2023-02-09 PROCEDURE — 6370000000 HC RX 637 (ALT 250 FOR IP): Performed by: STUDENT IN AN ORGANIZED HEALTH CARE EDUCATION/TRAINING PROGRAM

## 2023-02-09 PROCEDURE — 2580000003 HC RX 258: Performed by: NURSE PRACTITIONER

## 2023-02-09 PROCEDURE — 82948 REAGENT STRIP/BLOOD GLUCOSE: CPT

## 2023-02-09 PROCEDURE — 85025 COMPLETE CBC W/AUTO DIFF WBC: CPT

## 2023-02-09 PROCEDURE — 97535 SELF CARE MNGMENT TRAINING: CPT

## 2023-02-09 PROCEDURE — 6370000000 HC RX 637 (ALT 250 FOR IP): Performed by: HOSPITALIST

## 2023-02-09 PROCEDURE — 97530 THERAPEUTIC ACTIVITIES: CPT

## 2023-02-09 PROCEDURE — 80048 BASIC METABOLIC PNL TOTAL CA: CPT

## 2023-02-09 PROCEDURE — 85014 HEMATOCRIT: CPT

## 2023-02-09 PROCEDURE — 6370000000 HC RX 637 (ALT 250 FOR IP): Performed by: INTERNAL MEDICINE

## 2023-02-09 RX ORDER — LACTULOSE 10 G/15ML
20 SOLUTION ORAL 3 TIMES DAILY PRN
Status: DISCONTINUED | OUTPATIENT
Start: 2023-02-09 | End: 2023-02-10 | Stop reason: HOSPADM

## 2023-02-09 RX ADMIN — SODIUM CHLORIDE, PRESERVATIVE FREE 10 ML: 5 INJECTION INTRAVENOUS at 21:25

## 2023-02-09 RX ADMIN — LACTULOSE 20 G: 20 SOLUTION ORAL at 21:11

## 2023-02-09 RX ADMIN — OXYCODONE HYDROCHLORIDE 2.5 MG: 5 TABLET ORAL at 08:21

## 2023-02-09 RX ADMIN — SENNOSIDES 8.6 MG: 8.6 TABLET, FILM COATED ORAL at 21:10

## 2023-02-09 RX ADMIN — SODIUM CHLORIDE, PRESERVATIVE FREE 10 ML: 5 INJECTION INTRAVENOUS at 08:21

## 2023-02-09 RX ADMIN — FUROSEMIDE 20 MG: 20 TABLET ORAL at 08:20

## 2023-02-09 RX ADMIN — OXYCODONE HYDROCHLORIDE 2.5 MG: 5 TABLET ORAL at 23:04

## 2023-02-09 RX ADMIN — POLYETHYLENE GLYCOL (3350) 17 G: 17 POWDER, FOR SOLUTION ORAL at 08:21

## 2023-02-09 RX ADMIN — OXYCODONE HYDROCHLORIDE 2.5 MG: 5 TABLET ORAL at 18:53

## 2023-02-09 RX ADMIN — LACTULOSE 20 G: 20 SOLUTION ORAL at 13:02

## 2023-02-09 RX ADMIN — FOLIC ACID 1 MG: 1 TABLET ORAL at 08:20

## 2023-02-09 RX ADMIN — RIVAROXABAN 15 MG: 15 TABLET, FILM COATED ORAL at 18:03

## 2023-02-09 RX ADMIN — INSULIN GLARGINE 28 UNITS: 100 INJECTION, SOLUTION SUBCUTANEOUS at 21:10

## 2023-02-09 RX ADMIN — INSULIN GLARGINE 13 UNITS: 100 INJECTION, SOLUTION SUBCUTANEOUS at 08:34

## 2023-02-09 RX ADMIN — QUETIAPINE FUMARATE 25 MG: 25 TABLET ORAL at 21:11

## 2023-02-09 RX ADMIN — INSULIN LISPRO 5 UNITS: 100 INJECTION, SOLUTION INTRAVENOUS; SUBCUTANEOUS at 13:02

## 2023-02-09 RX ADMIN — OXYCODONE HYDROCHLORIDE 2.5 MG: 5 TABLET ORAL at 14:11

## 2023-02-09 RX ADMIN — METOPROLOL SUCCINATE 25 MG: 25 TABLET, FILM COATED, EXTENDED RELEASE ORAL at 08:20

## 2023-02-09 RX ADMIN — OXYCODONE HYDROCHLORIDE 2.5 MG: 5 TABLET ORAL at 01:24

## 2023-02-09 RX ADMIN — POLYETHYLENE GLYCOL (3350) 17 G: 17 POWDER, FOR SOLUTION ORAL at 21:10

## 2023-02-09 RX ADMIN — INSULIN LISPRO 5 UNITS: 100 INJECTION, SOLUTION INTRAVENOUS; SUBCUTANEOUS at 08:33

## 2023-02-09 RX ADMIN — DOCUSATE SODIUM 283 MG: 283 LIQUID RECTAL at 18:40

## 2023-02-09 RX ADMIN — QUETIAPINE FUMARATE 25 MG: 25 TABLET ORAL at 08:20

## 2023-02-09 RX ADMIN — ROSUVASTATIN 10 MG: 10 TABLET, FILM COATED ORAL at 08:20

## 2023-02-09 ASSESSMENT — PAIN DESCRIPTION - PAIN TYPE
TYPE: SURGICAL PAIN

## 2023-02-09 ASSESSMENT — PAIN SCALES - GENERAL
PAINLEVEL_OUTOF10: 8
PAINLEVEL_OUTOF10: 7
PAINLEVEL_OUTOF10: 6
PAINLEVEL_OUTOF10: 7
PAINLEVEL_OUTOF10: 10
PAINLEVEL_OUTOF10: 8

## 2023-02-09 ASSESSMENT — PAIN DESCRIPTION - FREQUENCY
FREQUENCY: CONTINUOUS

## 2023-02-09 ASSESSMENT — PAIN DESCRIPTION - DESCRIPTORS
DESCRIPTORS: ACHING
DESCRIPTORS: ACHING;THROBBING
DESCRIPTORS: ACHING

## 2023-02-09 ASSESSMENT — PAIN DESCRIPTION - ONSET
ONSET: ON-GOING

## 2023-02-09 ASSESSMENT — PAIN DESCRIPTION - LOCATION
LOCATION: HIP
LOCATION: LEG
LOCATION: HIP

## 2023-02-09 ASSESSMENT — PAIN DESCRIPTION - ORIENTATION
ORIENTATION: RIGHT

## 2023-02-09 NOTE — PROGRESS NOTES
Spoke with urology group regarding patients werner catheter. Per Prentiss Kayser CNP, keep werner in until patient is discharged to rehab. Rehab can reach out to urology if any further needs arise.

## 2023-02-09 NOTE — PROGRESS NOTES
[address] [facility] PHYSICAL THERAPY  DAILY NOTE  STRZ ICU STEPDOWN TELEMETRY 4K - 2O-04/398-L    Time In: 8684  Time Out: 4322  Timed Code Treatment Minutes: 41 Minutes  Minutes: 41          Date: 2023  Patient Name: Arnold Price,  Gender:  female        MRN: [id number]  : 1950  (81 y.o.)     Referring Practitioner: WYATT Morgan CNP  Diagnosis: acute diastolic heart failure  Additional Pertinent Hx: Malorie Dawson is a 73yoF with PMH of recent PE and b/l DVT, macrocytic anemia, CAD, DM2, tracheostomy dependent, obesity, and HTN who presents for leg swelling, nausea, and constipation. She was recently discharged after an 8 day admission for DVTs and PEs. She was placed on Xarelto when discharged and stated that she has been compliant and only missed one dose. She returned due to chronic constipation, feeling ''bloated'', nausea, and LE edema. While being evaluated in the ED the pt had a mechanical fall and developed a R femur fracture. She was found to have supratheraputic INR at 3.83. CT head and C-spine both clear. Pt is s/p R femur ORIF by Dr Carri Shoemaker . Pt found to have acute fractures of L2-S1 likely 2/2 osteoporosis, ok for activity as tolerated per Dr Eduardo Brady . Prior Level of Function:  Lives With: Spouse  Type of Home: House  Home Layout: One level  Home Access: Ramped entrance  Home Equipment: Euel Dash, rolling (BSC)   Bathroom Shower/Tub: Walk-in shower  Bathroom Toilet: Standard  Bathroom Equipment: Built-in shower seat    ADL Assistance: Needs assistance  Homemaking Assistance: Needs assistance  Ambulation Assistance: Independent  Transfer Assistance: Independent  Active : No  Additional Comments: pt amb short distances in the home with RW,  able to assist as needed.     Restrictions/Precautions:  Restrictions/Precautions: General Precautions, Fall Risk, Weight Bearing  Right Lower Extremity Weight Bearing: Weight Bearing As Tolerated  Position Activity Restriction  Other position/activity restrictions: tracheostomy/collar with moist air, 1-2 L at baseline     SUBJECTIVE: RN approved session. Patient in chair upon arrival and agreeable to therapy after encouragement. RN present to assist with tube management. Patient requested to use [facility] during session. PAIN: 10/10: R LE, pain meds given at beginning of session    Vitals: Vitals not assessed per clinical judgement, see nursing flowsheet    OBJECTIVE:  Bed Mobility:  Rolling to Left: Moderate Assistance, X 1, with head of bed flat, with rail   Rolling to Right: Moderate Assistance, X 1, with head of bed flat, with rail   **Verbal cues for sequencing and direction, cues for hand placement on rail, required assistance with tube management     Transfers:  Sit to Stand: Moderate Assistance, X 2  Stand to Sit:Minimal Assistance, X 2  **First stand trial Min A X2 from chair, Second stand trial Mod A X2 from chair, Third stand trial <1 min Mod X2 from elevated BSC  **Performed with [place], verbal cues for hand and foot placement, cues for scooting to edge of chair, cues for posture    Balance:  Static Standing Balance: Moderate Assistance, X 2, with verbal cues , with increased time for completion  **Stood with BUE at [place], patient unable to tolerate >1 min due to RLE pain in WB, max encouragement to stand, stood during pericare    Exercise:  Patient was guided in 1 set(s) 10 reps of exercise to both lower extremities. Ankle pumps, Glut sets, Heelslides, and Hip abduction/adduction. Exercises were completed for increased independence with functional mobility.   **PROM with RLE and required Min A with LLE: Heelslides and Hip abduction/adduction, noted facial grimacing with mobility, patient c/o pain in RLE, required encouragement to continue    Functional Outcome Measures: Completed  AM-PAC Inpatient Mobility without Stair Climbing Raw Score : 7  AM-PAC Inpatient without Stair Climbing T-Scale Score : 28.66    ASSESSMENT:  Assessment: Patient progressing toward established goals. Activity Tolerance:  Patient tolerance of  treatment: fair. Equipment Recommendations:Equipment Needed: No  Discharge Recommendations: Continue to assess pending progress, Patient would benefit from continued PT at discharge, and Inpatient Therapy Stay   Plan: Current Treatment Recommendations: Strengthening, Balance training, Functional mobility training, Neuromuscular re-education, Transfer training, Endurance training, Equipment evaluation, education, & procurement, Patient/Caregiver education & training, Safety education & training, Therapeutic activities, Home exercise program  General Plan:  (6x O)    Patient Education  Patient Education: Plan of Care, Bed Mobility, Transfers, Gait, Verbal Exercise Instruction    Goals:  Patient Goals : rehab and then home with   Short Term Goals  Time Frame for Short Term Goals: by discharge  Short Term Goal 1: Pt will demo rolling L and R in bed with CGA to improve positioning. Short Term Goal 2: Pt will demo supine to/from sit transfers with mod A x1 with modifications as needed to progress with mobility. Short Term Goal 3: Pt will tolerate 10 min sitting on EOB wtih S to complete functional tasks and progress with mobility. Short Term Goal 4: Pt will tolerate 10-20 reps of ther ex to increase overall mobility. Short Term Goal 5: Pt to transfer sit <--> stand min A for increased functional mobility. Long Term Goals  Time Frame for Long Term Goals : NA due to short ELOS    Following session, patient left in safe position with all fall risk precautions in place. Therapy session performed by Yumi BOSCH under supervision of credentialed therapist Elizabeth Gonzalez PTA.

## 2023-02-09 NOTE — PROGRESS NOTES
[address] [facility] ICU STEPDOWN TELEMETRY 4K  Occupational Therapy  Daily Note  Time:   Time In: 1330  Time Out: 1400  Timed Code Treatment Minutes: 30 Minutes  Minutes: 30          Date: 2023  Patient Name: Whitney Mcgee,   Gender: female      Room: [facility]24/024-A  MRN: [id number]  : 1950  (81 y.o.)  Referring Practitioner: WYATT Robertson CNP  Diagnosis: acute dystolic heart failure  Additional Pertinent Hx: Per ER note on 2023:72 y.o. female who presents for increasing leg swelling. Patient was just discharged from the hospital on 2023. She was discovered to have bilateral DVTs and PEs. She is supposed to be on rivaroxaban. She states she is taking her medication. She called the home health nurse tonight who instructed her to come in for evaluation and treatment. Swelling has been ongoing for quite some time. Pt sustained a mechanical fall while in the ED pending admission for BLE swelling, n/v. Immediate right sided hip pain, imaging revealed right femur fracture,   s/p Open treatment right intertrochanteric femur fracture the cephalomedullary nail on 2/3/2023. Restrictions/Precautions:  Restrictions/Precautions: General Precautions, Fall Risk, Weight Bearing  Right Lower Extremity Weight Bearing: Weight Bearing As Tolerated  Position Activity Restriction  Other position/activity restrictions: tracheostomy/collar with moist air, 1-2 L at baseline     SUBJECTIVE: RN okayed OT session. Pt. In room seated on [facility] upon entry and agreeable to participate in OT session with encouragement. PAIN: R hip did not rate ice applied and RN noted for pain medication. Vitals: Vitals not assessed per clinical judgement, see nursing flowsheet    COGNITION: Slow Processing, Decreased Recall, Decreased Insight, Decreased Problem Solving, Decreased Safety Awareness, and Impaired Attention    ADL:   Toileting: Dependent. Toilet Transfer: Moderate Assistance and X 2.  With jose haider . BALANCE:  Sitting Balance:  Stand By Assistance. Seated on EOB  Standing Balance: Minimal Assistance, X 2, with cues for safety. [facility]    BED MOBILITY:  Sit to Supine: Moderate Assistance, X 2      TRANSFERS:  Sit to Stand: Moderate Assistance, X 2, with Lonnie Hari, cues for hand placement. From [facility]   Stand to Sit: Minimal Assistance, X 2, with Lonnie Hari. To EOB    Lonnie Hari utilized to transfer Pt. From [facility] to EOB    ADDITIONAL ACTIVITIES:  Patient completed BUE strengthening exercises with skilled education on HEP: completed x10 reps x1 set with AROM in all joints and all planes in order to improve UE strength and activity tolerance required for BADL routine and toilet / shower transfers. Patient tolerated, requiring  rest breaks. Patient also required verbal and visual cues for technique. ASSESSMENT:     Activity Tolerance:  Patient tolerance of  treatment: fair. Fatigue and participation      Discharge Recommendations: Patient would benefit from continued OT at discharge and Inpatient Therapy Stay  Equipment Recommendations:  Other: Monitor pending progress  Plan: Times Per Week: 5x  Current Treatment Recommendations: Balance training, Functional mobility training, Endurance training, Self-Care / ADL, Safety education & training, Patient/Caregiver education & training, Strengthening    Patient Education  Patient Education: ADL's, Home Exercise Program, and Equipment Education    Goals  Short Term Goals  Time Frame for Short Term Goals: until discharge  Short Term Goal 1: Pt will tolerate 8-10 min EOB ADL task with S for balance  Short Term Goal 2: Pt will complete sit-stand t/fs with mod A x 1 for eventual BSC t/fs  Short Term Goal 3: Pt will tolerate standing 1 min with min A for increased ease of toileting routine  Short Term Goal 4: Pt will tolerate further assessment of functional t/fs by OTR when appropriate  Long Term Goals  Time Frame for Long Term Goals : No LTG set d/t short ELOS    Following session, patient left in safe position with all fall risk precautions in place.

## 2023-02-09 NOTE — PROGRESS NOTES
Pt refusing to turn at times throughout the shift. Education provided on the benefits of repositioning and the potential risks of not turning. Pt noted to have a DTI with small opening on left buttocks by RN this shift. Spoke with daughter about barrier cream and the benefits of turning. Will continue to educate and attempt to reposition.

## 2023-02-09 NOTE — PROGRESS NOTES
[address] [facility] THERAPY MISSED TREATMENT NOTE  STRZ ICU STEPDOWN TELEMETRY 4K      Date: 2023  Patient Name: Gilma Cheney        MRN: [id number]    : 1950  (81 y.o.)    REASON FOR MISSED TREATMENT:  Attempted to see patient for follow up speech therapy services. YVETTE Ramirez approved ST visit. Patient was busy with other staff this date. ST to re-attempt as able. Adama Ortiz M.A.  CCC-SLP

## 2023-02-09 NOTE — PROGRESS NOTES
Spoke with patient and family explained acute rehabilitation philosophy, including fact that rehab consists of inpatient stay at [facility]. Rehabilitation unit care includes 24-hour nursing care, oversight by physician, 3 hours of therapy 6 days a week, individualized treatment plan. Explained how benefits through insurance provider works. Discussed patient readiness for admission to Inpatient Rehab based. All medical testing must be completed and must be medically stable to be discharged from acute hospital setting to the acute inpatient rehabilitation unit. Patient and family agree with planned admission to acute inpatient rehab upon insurance approval and medical stability for admission. Patient and family able to explain reason for admission to acute inpatient rehab. Spoke at length with patient  and daughter Evita Aragon explained rehab philosophy the family agrees to have the patient admitted to IPR. Dr. Dorcas Tracy updated, spoke with Orthopedic PA Jack Drake regarding this patient via perfect serve message. 2/9/23 3:12 PM  [phone] From: [facility] RE: Romeo Eveline This patient family is requesting Rehab here. She is part of the bundle but they are passionate about rehab admission to IPR here. I spoke with [facility] LPN for Bundles and she said to reach out to you to inform you of the patient family request. The stay on rehab is usually 14 days. Please let me know if we have approval from you to admit here to IPR. Read 3:13 PM   2/9/23 3:13 PM   I have no clue what my role is in thus  2/9/23 3:13 PM   This *   2/9/23 3:16 PM   If its whats best for her and she qualifies for it from PM&R discussion I see no reason to not pursue IPR. Plan to admit patient to IPR when medically cleared and has had a bowel movement.

## 2023-02-09 NOTE — PROGRESS NOTES
Keep werner in until pt condition improves, having BM, ambulation improves, tolerating diet, pain control  Discussed with daughter at bedside and nurse

## 2023-02-09 NOTE — PROGRESS NOTES
Visited with patient during encounter the patient did not open her eyes at all and would not engage in conversation. Discussed this patient with Primary RN and Jessica Hubbard CM patient has not had a bowel movement documented since 1/28/2023. Nurse working on facilitating a bowel movement with patient. Spoke with Melva from [facility] the patient is in the Bundle for OIO.

## 2023-02-09 NOTE — PROGRESS NOTES
Hospitalist Progress Note      Patient:  Princess Marks    Unit/Bed:4K-24/024-A  YOB: 1950  MRN: [id number]   Acct: [de-identified]   PCP: Charisma Recinos MD  Date of Admission: 2/1/2023    Assessment/Plan:    - Acute right intertrochanteric femur fracture    -S/p R cephalomedullary nail; WBAT/AAT; PT/OT to see      - Supra therapeutic INR  - resolved  -back on DOAC today  -  - Acute PE  - CTA on 1/21/2023 confirming b/l non-obstructive PEs   Pt on R/a    - Acute on chronic normo/macrocytic anemia: likely 2/2 acute blood loss 2/2 fracture and ORIF; s/p PRBC; CT A/P (w/o woud've been sufficient) negative for any retroperitoneal bleed (given pt on [address] w/ recent fall); no clinically evident bleed otherwise; HD stable. VSS. Anemia w/u c/w ALEXANDRA and mild folic deificiency; will replace. Monitor Hb. PCP to consider GI referral for bidirectional endoscopy as warranted. - Hx of CAD  -Stable. Continue ASA, BB, statins    - Hx of DM2              on home insulin regimen w/ SSI w/ BS well-controlled. CCM. diabetic diet, SSI, accuchecks, and hypoglycemia orders in place, A1C 5.5    - Chronic Respiratory failure  -Pt has Hx of tracheostomy and tracheal stenosis  -O2 at baseline, continue O2 via trach mask.  -Sputum Cx positive for pseudomonas. Suspect colonized. No Abx warranted. d/c'ed Cipro. - Query Hx of HTN: pt on BB only w/ BP well-controlled. CCM and monitor    - Hx of chronic G1DD: clinically euvolemic. On furosemide 20 mg QD. CCM. I/O, daily wt, placed salt (2 gr) and fluid (2L) restriction. On BB    - Acute Delirium:  Likely multifactorial 2/2 post-op Hip#, pain, acute anemia, etc.  recommend judicious use of narcotics, beznos, antihistamines/antiememtics/Ach post-op. No need any further W/U at this time. - Obesity w/ BMI 38.41    - Multiple lumbar Compression fractures  -MRI ordered showing acute fractures of L2-S1 likely fragility 2/2 osteoporosis. 25(OH) vit D sent; No hyperthyroidism; pt will benefit from anti-resorptive Tx  when acute hip# resolved. MR also c/w multilevel degenerative lumbar spinal stenosis; no QES. Recommend OP Ortho/Spine F/U.  - Slightly elevated TSH w/ NL free T4: w/ no clinical finding suggestive of hypothyroidism;  this could represent SES; recommend repeat TSH in 4 wks time and F/U      - Constipation (resolved)  No concerns for obstruction; ileus  -On standing and prn laxatives. - Dispo  Family no longer requesting transfer to OSH. Plan for IPR, awaiting bed. Otherwise, PT/OT to see     Chief Complaint: fall and R hip#     Initial H and P:-    Admitted for management of R PFF; post-op course c/b acute on chronic anemia and PE. I took over care on 2/9/2023. Subjective (past 24 hours):   Resting in bed comfortably  Daughter at bedside  Hematuria noticed last night, however now bright yellow  Denies any new complaints   Feeling improved overall        Past medical history, family history, social history and allergies reviewed again and is unchanged since admission. ROS (All review of systems completed. Pertinent positives noted.  Otherwise All other systems reviewed and negative.)     Medications:  Reviewed    Infusion Medications    sodium chloride      sodium chloride      dextrose      sodium chloride      sodium chloride       Scheduled Medications    docusate sodium  1 enema Rectal Daily    folic acid  1 mg Oral Daily    metoprolol succinate  25 mg Oral Daily    polyethylene glycol  17 g Oral BID    lidocaine  2 patch TransDERmal Daily    insulin lispro  5 Units SubCUTAneous TID     insulin glargine  28 Units SubCUTAneous Nightly    insulin glargine  13 Units SubCUTAneous Daily with breakfast    QUEtiapine  25 mg Oral BID    aspirin  81 mg Oral Daily    rivaroxaban  15 mg Oral BID     rosuvastatin  10 mg Oral Daily    senna  1 tablet Oral Nightly    sodium chloride flush  5-40 mL IntraVENous 2 times per day insulin lispro  0-8 Units SubCUTAneous TID     insulin lispro  0-4 Units SubCUTAneous Nightly    furosemide  20 mg Oral Daily     PRN Meds: lactulose, sodium chloride, sodium chloride, glucose, dextrose bolus **OR** dextrose bolus, glucagon (rDNA), dextrose, sodium chloride, albuterol sulfate HFA, Menthol, guaiFENesin, sodium chloride flush, sodium chloride, acetaminophen **OR** acetaminophen, potassium chloride **OR** potassium alternative oral replacement **OR** potassium chloride, magnesium sulfate, oxyCODONE **OR** [DISCONTINUED] oxyCODONE, hydrOXYzine      Intake/Output Summary (Last 24 hours) at 2/9/2023 1857  Last data filed at 2/9/2023 1609  Gross per 24 hour   Intake 240 ml   Output 1325 ml   Net -1085 ml         Diet:  ADULT ORAL NUTRITION SUPPLEMENT; Breakfast, Dinner; Wound Healing Oral Supplement  ADULT DIET; Regular; 4 carb choices (60 gm/meal); Low Sodium (2 gm); 2000 ml    Physical Exam:  /63   Pulse 92   Temp 99.5 °F (37.5 °C) (Oral)   Resp 16   Ht 5' 2\" (1.575 m)   Wt 215 lb 14.4 oz (97.9 kg)   LMP  (LMP Unknown)   SpO2 96%   BMI 39.49 kg/m²   General appearance: Alert and oriented X3. Alert and awake w/o confusion or agitation. No apparent distress. HEENT: Pupils equal, round, and reactive to light. Conjunctivae/corneas clear. Neck: Supple, with full range of motion. No jugular venous distention. Trachea midline. Respiratory:  Normal respiratory effort. Clear to auscultation, bilaterally without Rales/Wheezes/Rhonchi. Cardiovascular: Regular rate and rhythm with normal S1/S2 without murmurs, rubs or gallops. Abdomen: Soft, non-tender, non-distended with normal bowel sounds. Musculoskeletal: passive and active ROM x 4 extremities. R hip dressing C/DI  Skin: trace bilat edema  Neurologic:  Neurovascularly intact without any focal sensory/motor deficits.  Cranial nerves: II-XII intact, grossly non-focal.  Psychiatric: Alert and oriented, thought content appropriate, normal insight  Capillary Refill: Brisk,< 3 seconds   Peripheral Pulses: +2 palpable, equal bilaterally     Labs:   Recent Labs     02/07/23  0602 02/07/23 2050 02/08/23  1255 02/08/23 2023 02/09/23  0457   WBC 10.0  --   --   --  8.9   HGB 6.4*   < > 9.0* 9.2* 8.7*   HCT 20.0*   < > 28.3* 28.5* 28.0*     --   --   --  215    < > = values in this interval not displayed. Recent Labs     02/07/23  0602 02/09/23 0457    139   K 4.2 4.0    104   CO2 28 26   BUN 18 16   CREATININE 0.7 0.7   CALCIUM 8.3* 8.0*       No results for input(s): AST, ALT, BILIDIR, BILITOT, ALKPHOS in the last 72 hours. Recent Labs     02/08/23  2328   INR 2.88*     No results for input(s): Murlene [place]awkie in the last 72 hours. Microbiology:    Blood culture #1:   Lab Results   Component Value Date/Time    BC No growth-preliminary 01/02/2022 03:29 PM    BC  01/02/2022 03:29 PM     possible contamination; clinical correlation required    BC No growth-preliminary No growth 01/02/2022 03:29 PM       Blood culture #2:No results found for: William Carrie    Organism:  Lab Results   Component Value Date/Time    ORG Pseudomonas aeruginosa 02/02/2023 03:55 PM    ORG Stenotrophomonas maltophilia 02/02/2023 03:55 PM         Lab Results   Component Value Date/Time    LABGRAM  02/02/2023 03:55 PM     Moderate segmented neutrophils observed. Rare epithelial cells observed. Moderate gram negative bacilli. MRSA culture only:No results found for: [facility]    Urine culture:   Lab Results   Component Value Date/Time    LABURIN  11/17/2022 04:25 PM     No growth-preliminary Mixed growth. The mixture of organisms present represents both organisms that may cause urinary tract infections and organisms that are not a common cause of urinary tract infections and are possibly skin john or distal urethral john.           Respiratory culture: No results found for: CULTRESP    Aerobic and Anaerobic :  Lab Results   Component Value Date/Time LABAERO No Acinetobacter species isolated. 02/02/2023 10:00 AM     No results found for: LABANAE    Urinalysis:      Lab Results   Component Value Date/Time    NITRU NEGATIVE 02/02/2023 05:10 PM    WBCUA 25-50 02/02/2023 02:10 PM    BACTERIA FEW 02/02/2023 02:10 PM    RBCUA 5-10 02/02/2023 02:10 PM    BLOODU NEGATIVE 02/02/2023 05:10 PM    SPECGRAV >1.030 02/02/2023 02:10 PM    GLUCOSEU NEGATIVE 02/02/2023 05:10 PM       Radiology:  CT ABDOMEN PELVIS WO CONTRAST Additional Contrast? None   Final Result   1. Pericolonic inflammatory changes around the sigmoid colon and descending colon with diverticula relate to acute diverticulitis. 2. A sinus tract is seen connecting the right femoral greater trochanter to the skin. This is only partially imaged on this study but direct visualization is recommended. Further imaging may be required. 3. A 7 mm right lung base pulmonary nodule. 4. Acute on chronic compression fractures of L2, L3, L4 and L5. Marked osteopenia. 5. Other findings as described above. **This report has been created using voice recognition software. It may contain minor errors which are inherent in voice recognition technology. **      Final report electronically signed by Dr Ruth Patel on 2/8/2023 12:31 PM      MRI LUMBAR SPINE [address]   Final Result       1. Acute fractures with fluid clefts associated with the endplates at the C0-U7 levels. The height loss of each vertebral body is mild. These are most likely pathologic secondary to underlying osteoporosis. 2. 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 **This report has been created using voice recognition software. It may contain minor errors which are inherent in voice recognition technology. **      Final report electronically signed by Dr. Griselda Howe on 2/6/2023 3:20 PM      XR LUMBAR SPINE (2-3 VIEWS)   Final Result   1.  Slight thoracolumbar levoscoliosis. Cannot exclude muscle spasm right side. 2. Moderate vertebral body spondylosis scattered in the lumbar and lower thoracic spine. Moderate degenerative facet arthropathy involving at least the lower 3 lumbar levels. 3. Diffuse demineralization of the bones, consistent with osteoporosis. Mild superior endplate depression fractures L2, L3, and L4 of questionable acuity but none of these were present on the prior CT abdomen dated 1/10/2022. These were all present, in    retrospect, on and abdominal film dated 1/25/2023. 4. MRI lumbar spine would be helpful for further evaluation. Patient may be a good candidate for vertebroplasty. If so, this can be arranged through the interventional scheduling desk of [facility] radiology department (extension [phone]). **This report has been created using voice recognition software. It may contain minor errors which are inherent in voice recognition technology. **      Final report electronically signed by Dr. Elodia Nuñez on 2/6/2023 8:23 AM      XR FEMUR RIGHT (MIN 2 VIEWS)   Final Result   Intraoperative imaging demonstrating open reduction internal fixation right hip            **This report has been created using voice recognition software. It may contain minor errors which are inherent in voice recognition technology. **      Final report electronically signed by Dr. Dannie Cabrales on 2/3/2023 3:02 PM      FLUORO FOR SURGICAL PROCEDURES   Final Result      XR CHEST PORTABLE   Final Result   1. Marked cardiomegaly. Right arm PICC line, catheter tip at cavoatrial projection. . Tracheostomy tube in place. 2. No acute findings. No infiltrates or effusions are seen. **This report has been created using voice recognition software. It may contain minor errors which are inherent in voice recognition technology. **      Final report electronically signed by Dr. Elodia Nuñez on 2/2/2023 2:20 PM      XR ABDOMEN (KUB) (SINGLE AP VIEW) Final Result   Impression:    Resolved constipation. Right femoral intertrochanteric fracture. This document has been electronically signed by: [place]. Terie Goldmann on    02/02/2023 05:35 AM      CT CERVICAL SPINE WO CONTRAST   Final Result    No fracture. **This report has been created using voice recognition software. It may contain minor errors which are inherent in voice recognition technology. **      Final report electronically signed by Dr. Kory Dodge on 2/2/2023 7:38 AM      CT HEAD WO CONTRAST   Final Result    No evidence of an acute process. **This report has been created using voice recognition software. It may contain minor errors which are inherent in voice recognition technology. **      Final report electronically signed by Dr. Kory Dodge on 2/2/2023 7:25 AM      XR HIP 2-3 VW W PELVIS RIGHT   Final Result   Impression:   Acute right femoral intertrochanteric fracture, with subtrochanteric    extension. This document has been electronically signed by: Mandeep Singh MD on    02/02/2023 05:01 AM      XR CHEST 1 VIEW   Final Result   Impression:   No acute cardiopulmonary disease. Cardiomegaly. This document has been electronically signed by: Mandeep Singh MD on    02/02/2023 05:02 AM      CTA CHEST W WO CONTRAST   Final Result   Impression:   1. Stable bilateral nonocclusive pulmonary emboli. Negative for CT    evidence of right heart strain or thrombus propagation. No new pulmonary    emboli since the prior study 1/21/23. 2. New trace bilateral pleural effusions. 3. Nonemergent/incidental findings as above. This document has been electronically signed by: Mandeep Singh MD on    02/02/2023 02:13 AM      All CTs at this facility use dose modulation techniques and iterative    reconstructions, and/or weight-based dosing   when appropriate to reduce radiation to a low as reasonably achievable. 3D Post-processing was performed on this study. XR ABDOMEN (KUB) (SINGLE AP VIEW)    Result Date: 2/2/2023  Supine AP abdomen. Comparison: 1/25/2023 Findings: Significant less fecal contents within the colon. Nonobstructed bowel gas pattern. No free intraperitoneal air. Moderate bladder distention with excreted contrast. Minimally displaced comminuted right lower intertrochanteric fracture with subtrochanteric extension. Impression:  Resolved constipation. Right femoral intertrochanteric fracture. This document has been electronically signed by: Mamie Ruiz. Shiv Mark on 02/02/2023 05:35 AM    CT HEAD WO CONTRAST    Result Date: 2/2/2023  PROCEDURE: CT HEAD WO CONTRAST CLINICAL INFORMATION: Slip and fall, pain. COMPARISON: No prior study. TECHNIQUE: Noncontrast 5 mm axial images were obtained through the brain. Sagittal and coronal reconstructions were obtained. All CT scans at this facility use dose modulation, iterative reconstruction, and/or weight-based dosing when appropriate to reduce radiation dose to as low as reasonably achievable. FINDINGS: The brain volume is within acceptable limits. There is no hemorrhage. There are no intra-or extra-axial collections. There is no hydrocephalus, midline shift or mass effect. There is a moderate amount of abnormal low attenuation in the white matter the brain suggesting chronic small vessel ischemic changes. The paranasal sinuses and mastoid air cells are normally aerated. There is no suspicious calvarial abnormality. No evidence of an acute process. **This report has been created using voice recognition software. It may contain minor errors which are inherent in voice recognition technology. ** Final report electronically signed by Dr. Rashawn Street on 2/2/2023 7:25 AM    CTA CHEST W WO CONTRAST    Result Date: 2/2/2023  CTA Chest with contrast: PE Protocol. Indication: Shortness of breath. Recent PE. Technique: CTA chest with intravenous contrast. Coronal and sagittal reformations. MIP images. Comparison: CT/KO/SR - CTA CHEST W WO CONTRAST - 01/21/2023 09:34 AM EST Findings: Tracheostomy tube 4.8 cm above the telma. Right PICC line with tip in the lower SVC, new since prior study. Removal of the left PICC line. The intravenous contrast bolus adequately opacifies pulmonary arterial vasculature. Nonocclusive intraluminal filling defects in the right lower lobe segmental/subsegmental branches of the pulmonary artery (series 601 images ) as well as left lower lobe branch of the pulmonary artery (series 601 image [id number]), similar to previous study. No new areas of pulmonary embolus. New trace bilateral pleural effusions. . Mild interstitial thickening, similar to previous study. Stable right lower lobe nodule up to 0.5 cm. Respiratory motion artifacts and atelectasis. No bulky mediastinal, hilar, or axillary lymphadenopathy. Heart is enlarged in size. Stent versus calcification LAD. Coronary atherosclerotic calcifications. Trace pericardial effusion. Lipomatous hypertrophy of the interatrial septum. The thoracic aorta is normal in caliber, with mild atherosclerotic calcifications. Small hiatal hernia. Partially imaged upper abdomen: No upper abdominal ascites. Bones: Spondylosis. Stable sclerosis of the posterior left 9th rib. T12 and L1 vertebral hemangiomata. Impression: 1. Stable bilateral nonocclusive pulmonary emboli. Negative for CT evidence of right heart strain or thrombus propagation. No new pulmonary emboli since the prior study 1/21/23. 2. New trace bilateral pleural effusions. 3. Nonemergent/incidental findings as above. This document has been electronically signed by: Hamida Shaw MD on 02/02/2023 02:13 AM All CTs at this facility use dose modulation techniques and iterative reconstructions, and/or weight-based dosing when appropriate to reduce radiation to a low as reasonably achievable. 3D Post-processing was performed on this study.     CT CERVICAL SPINE WO CONTRAST    Result Date: 2/2/2023  PROCEDURE: CT CERVICAL SPINE WO CONTRAST CLINICAL INFORMATION: Slip and fall, pain. COMPARISON: No prior study. TECHNIQUE: 3 mm noncontrast axial images were obtained through the cervical spine with sagittal and coronal reconstructions. All CT scans at this facility use dose modulation, iterative reconstruction, and/or weight-based dosing when appropriate to reduce radiation dose to as low as reasonably achievable. FINDINGS: There is osseous demineralization. The cervical vertebral bodies are normally aligned. There is no fracture. There is no prevertebral soft tissue swelling. There are some scattered degenerative changes. There is no significant spinal canal stenosis. No suspicious osseous lesions are present. There are no suspicious findings in the cervical soft tissues. There are no suspicious findings in the lung apices. There is a tracheostomy tube. No fracture. **This report has been created using voice recognition software. It may contain minor errors which are inherent in voice recognition technology. ** Final report electronically signed by Dr. Marlyn Rodriguez on 2/2/2023 7:38 AM    XR CHEST PORTABLE    Result Date: 2/2/2023  PROCEDURE: XR CHEST PORTABLE CLINICAL INFORMATION: post PICC placement COMPARISON: Earlier film same date, 0426 hours. TECHNIQUE: A single mobile view of the chest was obtained. 1. Marked cardiomegaly. Right arm PICC line, catheter tip at cavoatrial projection. . Tracheostomy tube in place. 2. No acute findings. No infiltrates or effusions are seen. **This report has been created using voice recognition software. It may contain minor errors which are inherent in voice recognition technology. ** Final report electronically signed by Dr. Shivam Hancock on 2/2/2023 2:20 PM    XR CHEST 1 VIEW    Result Date: 2/2/2023  Chest X-ray: 1 view. Indication: Slip and fall.  Comparison: CT/SR - CTA CHEST W WO CONTRAST - 02/02/2023 12:59 AM EST Findings: Tracheostomy tube in position. Right PICC line, with tip obscured by the overlapping thoracic spine. No pneumothorax. No moderate or large pleural effusion. Mild low lung volumes. No pulmonary edema. The cardiac silhouette is enlarged in size. Bony thorax is grossly intact. Spondylosis. Mild calcific tendinosis right glenohumeral joint. Degenerative osteoarthritis acromioclavicular joints. Impression: No acute cardiopulmonary disease. Cardiomegaly. This document has been electronically signed by: Rainer Diamond MD on 02/02/2023 05:02 AM    XR HIP 2-3 VW W PELVIS RIGHT    Result Date: 2/2/2023  Right hip x-ray: 2 views with AP pelvis. Indication: Slip and fall. Comparison: None Findings: Acute right femoral intertrochanteric fracture, with subtrochanteric extension. Normal alignment of the right femoroacetabular joint. Bony demineralization. Normal alignment of the bilateral sacroiliac and left hip joints. Lumbar spondylosis. Excreted contrast in the urinary bladder. Peripheral arterial vascular calcifications. Impression: Acute right femoral intertrochanteric fracture, with subtrochanteric extension. This document has been electronically signed by: Rainer Diamond MD on 02/02/2023 05:01 AM    With RN in room, patient,  and two daughters were updated about and agreed upon the treatment plan, all the questions and concerns were addressed. About 60 minutes of time spent on counseling; family meeting and talking to hospitals in addition to [address] [place] [address].      Electronically signed by Vi Carrillo MD on 2/9/2023 at 6:57 PM

## 2023-02-09 NOTE — PROGRESS NOTES
Patient is 82 y/o female admitted 2/1/23 for acute heart failure. Alert and oriented x4, pupils are equal, round, and reactive to light. Upper extremities are warm, pink, and dry with moderate hand grasp and +1 edema bilaterally. Skin turgor and capillary refill are both <3 seconds. Patient reports full sensation of upper extremities with no numbness or tingling noted. Heart sounds are regular rhythm and rate. Lungs sounds are clear in the upper lobes and diminished in the lower lobes, respirations are regular and unlabored with symmetrical chest movements. Abdomen is soft, round and tender in the lower quadrants with active bowel sounds. Lower extremities are pink, warm, and dry with +2 pitting edema bilaterally. Plantar flexion and dorsiflexion is moderate in the LLE and weak in the RLE with weak pedal and post tibial pulses bilaterally. Patient reports some numbness and tingling in lower extremities due to diabetic neuropathy. Patient has a werner catheter, tracheostomy with oxygen collar, Right brachial PICC, and right anterior thigh incision from sx. Unable to assess coccyx due to patient refusal to turn, per primary RN patient has blanchable erythema wound on left buttocks. Patient is in bed resting and refused to reposition, call light is in reach.    Marie Mei RSMARYAM/SN

## 2023-02-09 NOTE — CARE COORDINATION
2/9/23, 8:42 AM EST    DISCHARGE ON GOING [address] day: 7  Location: -24/024-A Reason for admit: Acute diastolic heart failure ([address] [I50.31]  Leg swelling [M79.89]  Supratherapeutic INR [R79.1]  Fall, initial encounter [W19. XXXA]  Closed displaced intertrochanteric fracture of right femur, initial encounter ([facility] [S72.141A]  Acute heart failure, unspecified heart failure type ([address] [I50.9]   Procedure:   2/2 CTA Chest W WO Contrast 1. Stable bilateral nonocclusive pulmonary emboli. Negative for CT   evidence of right heart strain or thrombus propagation. No new pulmonary   emboli since the prior study 1/21/23. 2. New trace bilateral pleural effusions. 3. Nonemergent/incidental findings as above. 2/2 CXR 1. Marked cardiomegaly. Right arm PICC line, catheter tip at cavoatrial projection. . Tracheostomy tube in place. 2. No acute findings. No infiltrates or effusions are seen. 2/2 XR Hip VW W Pelvis right Acute right femoral intertrochanteric fracture, with subtrochanteric   extension. 2/3 Open treatment right intertrochanteric femur fracture the cephalomedullary nail 36083     2/6 XR Lumbar Spine 1. Slight thoracolumbar levoscoliosis. Cannot exclude muscle spasm right side. 2. Moderate vertebral body spondylosis scattered in the lumbar and lower thoracic spine. Moderate degenerative facet arthropathy involving at least the lower 3 lumbar levels. 3. Diffuse demineralization of the bones, consistent with osteoporosis. Mild superior endplate depression fractures L2, L3, and L4 of questionable acuity but none of these were present on the prior CT abdomen dated 1/10/2022. These were all present, in   retrospect, on and abdominal film dated 1/25/2023. 4. MRI lumbar spine would be helpful for further evaluation. Patient may be a good candidate for vertebroplasty.  If so, this can be arranged through the interventional scheduling desk of [facility] radiology department (extension [phone]). 2/6 MRI Lumbar Spine WO Contrast 1. Acute fractures with fluid clefts associated with the endplates at the G0-O7 levels. The height loss of each vertebral body is mild. These are most likely pathologic secondary to underlying osteoporosis. 2. 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 2/8 CT Abdomen Pelvis WO contrast 1. Pericolonic inflammatory changes around the sigmoid colon and descending colon with diverticula relate to acute diverticulitis. 2. A sinus tract is seen connecting the right femoral greater trochanter to the skin. This is only partially imaged on this study but direct visualization is recommended. Further imaging may be required. 3. A 7 mm right lung base pulmonary nodule. 4. Acute on chronic compression fractures of L2, L3, L4 and L5. Marked osteopenia. 5. Other findings as described above. Barriers to Discharge: Hgb 8.7, INR 2.88, PT/OT, pain control, Dietician, Physiatry following, Lasix, diabetes management, electrolyte replacement protocols. PCP: Starr Vail MD  Readmission Risk Score: 32.1%  Patient Goals/Plan/Treatment Preferences: From home with spouse. Planning IPR when medically stable. Will follow.

## 2023-02-09 NOTE — PLAN OF CARE
Problem: Respiratory - Adult  Goal: Achieves optimal ventilation and oxygenation  2/9/2023 0539 by Esme Duenas RCP  Outcome: Progressing   Remains on trach mask for humidification. Patient mutually agreed on goals.

## 2023-02-09 NOTE — PROGRESS NOTES
Spoke with Primary RN Damon Saeed regarding patient requests for [facility] transfer. Explained that IPR has been following this patient and will continue to follow for possible admission to [facility] upon patient and family agreement and patient medical stability.

## 2023-02-09 NOTE — PLAN OF CARE
Problem: Discharge Planning  Goal: Discharge to home or other facility with appropriate resources  2/9/2023 0236 by Marylen Herald, RN  Outcome: Progressing  Flowsheets (Taken 2/8/2023 1943)  Discharge to home or other facility with appropriate resources:   Identify barriers to discharge with patient and caregiver   Identify discharge learning needs (meds, wound care, etc)     Problem: Safety - Adult  Goal: Free from fall injury  2/9/2023 0236 by Marylen Herald, RN  Outcome: Progressing  Flowsheets (Taken 2/9/2023 0236)  Free From Fall Injury: Instruct family/caregiver on patient safety     Problem: Skin/Tissue Integrity  Goal: Absence of new skin breakdown  Description: 1. Monitor for areas of redness and/or skin breakdown  2. Assess vascular access sites hourly  3. Every 4-6 hours minimum:  Change oxygen saturation probe site  4. Every 4-6 hours:  If on nasal continuous positive airway pressure, respiratory therapy assess nares and determine need for appliance change or resting period. 2/9/2023 0236 by Marylen Herald, RN  Outcome: Progressing  Note: Pt educated to reposition self freq in bed. Skin assessed with each assessment every 4 hrs.         Problem: Neurosensory - Adult  Goal: Achieves stable or improved neurological status  2/9/2023 0236 by Marylen Herald, RN  Outcome: Progressing  Flowsheets (Taken 2/8/2023 1943)  Achieves stable or improved neurological status: Assess for and report changes in neurological status     Problem: Neurosensory - Adult  Goal: Achieves maximal functionality and self care  2/9/2023 0236 by Marylen Herald, RN  Outcome: Progressing  Flowsheets (Taken 2/8/2023 1943)  Achieves maximal functionality and self care: Monitor swallowing and airway patency with patient fatigue and changes in neurological status     Problem: Respiratory - Adult  Goal: Achieves optimal ventilation and oxygenation  2/9/2023 0236 by Marylen Herald, RN  Outcome: Progressing  Flowsheets (Taken 2/8/2023 1943)  Achieves optimal ventilation and oxygenation:   Assess for changes in respiratory status   Assess for changes in mentation and behavior   Position to facilitate oxygenation and minimize respiratory effort   Oxygen supplementation based on oxygen saturation or arterial blood gases   Assess and instruct to report shortness of breath or any respiratory difficulty   Respiratory therapy support as indicated     Problem: Cardiovascular - Adult  Goal: Maintains optimal cardiac output and hemodynamic stability  2/9/2023 0236 by Smitha Vera RN  Outcome: Progressing  Flowsheets (Taken 2/8/2023 1943)  Maintains optimal cardiac output and hemodynamic stability:   Monitor blood pressure and heart rate   Monitor urine output and notify Licensed Independent Practitioner for values outside of normal range     Problem: Pain  Goal: Verbalizes/displays adequate comfort level or baseline comfort level  2/9/2023 0236 by Smitha Vera RN  Outcome: Progressing  Flowsheets (Taken 2/8/2023 1943)  Verbalizes/displays adequate comfort level or baseline comfort level:   Encourage patient to monitor pain and request assistance   Assess pain using appropriate pain scale   Administer analgesics based on type and severity of pain and evaluate response   Implement non-pharmacological measures as appropriate and evaluate response   Consider cultural and social influences on pain and pain management     Problem: Chronic Conditions and Co-morbidities  Goal: Patient's chronic conditions and co-morbidity symptoms are monitored and maintained or improved  2/9/2023 0236 by Smitha Vera RN  Outcome: Progressing  Flowsheets (Taken 2/8/2023 1943)  Care Plan - Patient's Chronic Conditions and Co-Morbidity Symptoms are Monitored and Maintained or Improved:   Monitor and assess patient's chronic conditions and comorbid symptoms for stability, deterioration, or improvement   Collaborate with multidisciplinary team to address chronic and comorbid conditions and prevent exacerbation or deterioration     Problem: Genitourinary - Adult  Goal: Absence of urinary retention  2/9/2023 0236 by Bhanu Campoverde RN  Outcome: Progressing  Flowsheets (Taken 2/8/2023 1943)  Absence of urinary retention:   Assess patients ability to void and empty bladder   Monitor intake/output and perform bladder scan as needed     Problem: Genitourinary - Adult  Goal: Urinary catheter remains patent  2/9/2023 0236 by Bhanu Campoverde RN  Outcome: Progressing  Flowsheets (Taken 2/8/2023 1943)  Urinary catheter remains patent: Assess patency of urinary catheter     Problem: Infection - Adult  Goal: Absence of infection at discharge  2/9/2023 0236 by Bhanu Campoverde RN  Outcome: Progressing  Flowsheets (Taken 2/8/2023 1943)  Absence of infection at discharge:   Assess and monitor for signs and symptoms of infection   Monitor lab/diagnostic results   Monitor all insertion sites i.e., indwelling lines, tubes and drains   Administer medications as ordered   Instruct and encourage patient and family to use good hand hygiene technique     Problem: Infection - Adult  Goal: Absence of infection during hospitalization  2/9/2023 0236 by Bhanu Campoverde RN  Outcome: Progressing  Flowsheets (Taken 2/8/2023 1943)  Absence of infection during hospitalization:   Assess and monitor for signs and symptoms of infection   Monitor lab/diagnostic results   Monitor all insertion sites i.e., indwelling lines, tubes and drains   Administer medications as ordered   Instruct and encourage patient and family to use good hand hygiene technique     Problem: Nutrition Deficit:  Goal: Optimize nutritional status  2/9/2023 0236 by Bhanu Campoverde RN  Outcome: Progressing  Flowsheets (Taken 2/9/2023 0236)  Nutrient intake appropriate for improving, restoring, or maintaining nutritional needs: Monitor oral intake, labs, and treatment plans     Problem: ABCDS Injury Assessment  Goal: Absence of physical injury  2/9/2023 0236 by Alena Fernandez, RN  Outcome: Progressing  Flowsheets (Taken 2/9/2023 0236)  Absence of Physical Injury: Implement safety measures based on patient assessment    Care plan reviewed with patient. Patient verbalized understanding of the plan of care and contributed to goal setting.

## 2023-02-10 ENCOUNTER — HOSPITAL ENCOUNTER (INPATIENT)
Age: 73
LOS: 20 days | Discharge: NURSING, REHAB OR LONG-TERM CARE | DRG: 560 | End: 2023-03-02
Attending: PHYSICAL MEDICINE & REHABILITATION | Admitting: PHYSICAL MEDICINE & REHABILITATION
Payer: MEDICARE

## 2023-02-10 VITALS
DIASTOLIC BLOOD PRESSURE: 67 MMHG | OXYGEN SATURATION: 97 % | BODY MASS INDEX: 42.69 KG/M2 | WEIGHT: 232 LBS | HEIGHT: 62 IN | TEMPERATURE: 98.1 F | SYSTOLIC BLOOD PRESSURE: 130 MMHG | HEART RATE: 94 BPM | RESPIRATION RATE: 20 BRPM

## 2023-02-10 DIAGNOSIS — S72.141A CLOSED INTERTROCHANTERIC FRACTURE, RIGHT, INITIAL ENCOUNTER (HCC): Primary | ICD-10-CM

## 2023-02-10 PROBLEM — M81.0 OSTEOPOROSIS OF LUMBAR SPINE: Status: ACTIVE | Noted: 2023-02-10

## 2023-02-10 PROBLEM — D62 ANEMIA ASSOCIATED WITH ACUTE BLOOD LOSS: Status: ACTIVE | Noted: 2023-02-10

## 2023-02-10 PROBLEM — E66.01 MORBID OBESITY WITH BMI OF 40.0-44.9, ADULT (HCC): Status: ACTIVE | Noted: 2022-01-14

## 2023-02-10 LAB
ANION GAP SERPL CALC-SCNC: 8 MEQ/L (ref 8–16)
BASOPHILS ABSOLUTE: 0 THOU/MM3 (ref 0–0.1)
BASOPHILS NFR BLD AUTO: 0.4 %
BUN SERPL-MCNC: 12 MG/DL (ref 7–22)
CALCIUM SERPL-MCNC: 8 MG/DL (ref 8.5–10.5)
CHLORIDE SERPL-SCNC: 104 MEQ/L (ref 98–111)
CO2 SERPL-SCNC: 28 MEQ/L (ref 23–33)
CREAT SERPL-MCNC: 0.5 MG/DL (ref 0.4–1.2)
DEPRECATED RDW RBC AUTO: 62.6 FL (ref 35–45)
EOSINOPHIL NFR BLD AUTO: 0.8 %
EOSINOPHILS ABSOLUTE: 0.1 THOU/MM3 (ref 0–0.4)
ERYTHROCYTE [DISTWIDTH] IN BLOOD BY AUTOMATED COUNT: 17.9 % (ref 11.5–14.5)
GFR SERPL CREATININE-BSD FRML MDRD: > 60 ML/MIN/1.73M2
GLUCOSE BLD STRIP.AUTO-MCNC: 102 MG/DL (ref 70–108)
GLUCOSE BLD STRIP.AUTO-MCNC: 103 MG/DL (ref 70–108)
GLUCOSE BLD STRIP.AUTO-MCNC: 106 MG/DL (ref 70–108)
GLUCOSE BLD STRIP.AUTO-MCNC: 156 MG/DL (ref 70–108)
GLUCOSE SERPL-MCNC: 89 MG/DL (ref 70–108)
HCT VFR BLD AUTO: 27.2 % (ref 37–47)
HGB BLD-MCNC: 8.6 GM/DL (ref 12–16)
IMM GRANULOCYTES # BLD AUTO: 0.21 THOU/MM3 (ref 0–0.07)
IMM GRANULOCYTES NFR BLD AUTO: 2.5 %
LYMPHOCYTES ABSOLUTE: 1.1 THOU/MM3 (ref 1–4.8)
LYMPHOCYTES NFR BLD AUTO: 13.5 %
MCH RBC QN AUTO: 30.2 PG (ref 26–33)
MCHC RBC AUTO-ENTMCNC: 31.6 GM/DL (ref 32.2–35.5)
MCV RBC AUTO: 95.4 FL (ref 81–99)
MONOCYTES ABSOLUTE: 0.9 THOU/MM3 (ref 0.4–1.3)
MONOCYTES NFR BLD AUTO: 10.2 %
NEUTROPHILS NFR BLD AUTO: 72.6 %
NRBC BLD AUTO-RTO: 0 /100 WBC
PLATELET # BLD AUTO: 203 THOU/MM3 (ref 130–400)
PMV BLD AUTO: 9.3 FL (ref 9.4–12.4)
POTASSIUM SERPL-SCNC: 3.7 MEQ/L (ref 3.5–5.2)
RBC # BLD AUTO: 2.85 MILL/MM3 (ref 4.2–5.4)
SEGMENTED NEUTROPHILS ABSOLUTE COUNT: 6.2 THOU/MM3 (ref 1.8–7.7)
SODIUM SERPL-SCNC: 140 MEQ/L (ref 135–145)
WBC # BLD AUTO: 8.5 THOU/MM3 (ref 4.8–10.8)

## 2023-02-10 PROCEDURE — 2580000003 HC RX 258: Performed by: NURSE PRACTITIONER

## 2023-02-10 PROCEDURE — 85025 COMPLETE CBC W/AUTO DIFF WBC: CPT

## 2023-02-10 PROCEDURE — 97110 THERAPEUTIC EXERCISES: CPT

## 2023-02-10 PROCEDURE — 1180000000 HC REHAB R&B

## 2023-02-10 PROCEDURE — 6370000000 HC RX 637 (ALT 250 FOR IP): Performed by: NURSE PRACTITIONER

## 2023-02-10 PROCEDURE — 80048 BASIC METABOLIC PNL TOTAL CA: CPT

## 2023-02-10 PROCEDURE — 99239 HOSP IP/OBS DSCHRG MGMT >30: CPT | Performed by: HOSPITALIST

## 2023-02-10 PROCEDURE — 2500000003 HC RX 250 WO HCPCS: Performed by: PHYSICAL MEDICINE & REHABILITATION

## 2023-02-10 PROCEDURE — 2580000003 HC RX 258: Performed by: PHYSICAL MEDICINE & REHABILITATION

## 2023-02-10 PROCEDURE — 97530 THERAPEUTIC ACTIVITIES: CPT

## 2023-02-10 PROCEDURE — 6370000000 HC RX 637 (ALT 250 FOR IP): Performed by: STUDENT IN AN ORGANIZED HEALTH CARE EDUCATION/TRAINING PROGRAM

## 2023-02-10 PROCEDURE — 6370000000 HC RX 637 (ALT 250 FOR IP): Performed by: HOSPITALIST

## 2023-02-10 PROCEDURE — 82948 REAGENT STRIP/BLOOD GLUCOSE: CPT

## 2023-02-10 PROCEDURE — 2700000000 HC OXYGEN THERAPY PER DAY

## 2023-02-10 PROCEDURE — 99222 1ST HOSP IP/OBS MODERATE 55: CPT | Performed by: PHYSICAL MEDICINE & REHABILITATION

## 2023-02-10 PROCEDURE — 6370000000 HC RX 637 (ALT 250 FOR IP): Performed by: INTERNAL MEDICINE

## 2023-02-10 PROCEDURE — 94761 N-INVAS EAR/PLS OXIMETRY MLT: CPT

## 2023-02-10 PROCEDURE — 6370000000 HC RX 637 (ALT 250 FOR IP): Performed by: PHYSICAL MEDICINE & REHABILITATION

## 2023-02-10 PROCEDURE — 36415 COLL VENOUS BLD VENIPUNCTURE: CPT

## 2023-02-10 RX ORDER — SODIUM CHLORIDE 0.9 % (FLUSH) 0.9 %
5-40 SYRINGE (ML) INJECTION PRN
Status: DISCONTINUED | OUTPATIENT
Start: 2023-02-10 | End: 2023-03-02 | Stop reason: HOSPADM

## 2023-02-10 RX ORDER — ONDANSETRON 4 MG/1
4 TABLET, ORALLY DISINTEGRATING ORAL EVERY 8 HOURS PRN
Status: DISCONTINUED | OUTPATIENT
Start: 2023-02-10 | End: 2023-02-22

## 2023-02-10 RX ORDER — SODIUM CHLORIDE 9 MG/ML
INJECTION, SOLUTION INTRAVENOUS PRN
Status: DISCONTINUED | OUTPATIENT
Start: 2023-02-10 | End: 2023-03-02 | Stop reason: HOSPADM

## 2023-02-10 RX ORDER — POLYETHYLENE GLYCOL 3350 17 G/17G
17 POWDER, FOR SOLUTION ORAL DAILY PRN
Status: CANCELLED | OUTPATIENT
Start: 2023-02-10

## 2023-02-10 RX ORDER — POTASSIUM CHLORIDE 7.45 MG/ML
10 INJECTION INTRAVENOUS PRN
Status: DISCONTINUED | OUTPATIENT
Start: 2023-02-10 | End: 2023-03-02 | Stop reason: HOSPADM

## 2023-02-10 RX ORDER — QUETIAPINE FUMARATE 25 MG/1
25 TABLET, FILM COATED ORAL NIGHTLY
Status: DISCONTINUED | OUTPATIENT
Start: 2023-02-10 | End: 2023-03-02 | Stop reason: HOSPADM

## 2023-02-10 RX ORDER — DOCUSATE SODIUM 100 MG/1
100 CAPSULE, LIQUID FILLED ORAL 2 TIMES DAILY
Status: DISCONTINUED | OUTPATIENT
Start: 2023-02-10 | End: 2023-03-02 | Stop reason: HOSPADM

## 2023-02-10 RX ORDER — ACETAMINOPHEN 325 MG/1
650 TABLET ORAL EVERY 4 HOURS PRN
Status: DISCONTINUED | OUTPATIENT
Start: 2023-02-10 | End: 2023-03-02 | Stop reason: HOSPADM

## 2023-02-10 RX ORDER — LANOLIN ALCOHOL/MO/W.PET/CERES
6 CREAM (GRAM) TOPICAL NIGHTLY
Status: DISCONTINUED | OUTPATIENT
Start: 2023-02-10 | End: 2023-03-02 | Stop reason: HOSPADM

## 2023-02-10 RX ORDER — METOPROLOL SUCCINATE 25 MG/1
25 TABLET, EXTENDED RELEASE ORAL DAILY
Status: DISCONTINUED | OUTPATIENT
Start: 2023-02-11 | End: 2023-03-02 | Stop reason: HOSPADM

## 2023-02-10 RX ORDER — POLYETHYLENE GLYCOL 3350 17 G/17G
17 POWDER, FOR SOLUTION ORAL DAILY PRN
Status: DISCONTINUED | OUTPATIENT
Start: 2023-02-10 | End: 2023-02-10 | Stop reason: SDUPTHER

## 2023-02-10 RX ORDER — ALBUTEROL SULFATE 90 UG/1
2 AEROSOL, METERED RESPIRATORY (INHALATION) EVERY 6 HOURS PRN
Status: DISCONTINUED | OUTPATIENT
Start: 2023-02-10 | End: 2023-03-02 | Stop reason: HOSPADM

## 2023-02-10 RX ORDER — INSULIN LISPRO 100 [IU]/ML
5 INJECTION, SOLUTION INTRAVENOUS; SUBCUTANEOUS
Status: DISCONTINUED | OUTPATIENT
Start: 2023-02-10 | End: 2023-02-12

## 2023-02-10 RX ORDER — OXYCODONE HYDROCHLORIDE 5 MG/1
2.5 TABLET ORAL EVERY 4 HOURS PRN
Status: DISCONTINUED | OUTPATIENT
Start: 2023-02-10 | End: 2023-02-15

## 2023-02-10 RX ORDER — ROSUVASTATIN CALCIUM 10 MG/1
10 TABLET, COATED ORAL DAILY
Status: DISCONTINUED | OUTPATIENT
Start: 2023-02-11 | End: 2023-03-02 | Stop reason: HOSPADM

## 2023-02-10 RX ORDER — INSULIN GLARGINE 100 [IU]/ML
28 INJECTION, SOLUTION SUBCUTANEOUS NIGHTLY
Status: DISCONTINUED | OUTPATIENT
Start: 2023-02-10 | End: 2023-02-17

## 2023-02-10 RX ORDER — POLYETHYLENE GLYCOL 3350 17 G/17G
17 POWDER, FOR SOLUTION ORAL 2 TIMES DAILY
Status: DISCONTINUED | OUTPATIENT
Start: 2023-02-10 | End: 2023-02-10

## 2023-02-10 RX ORDER — LACTULOSE 10 G/15ML
20 SOLUTION ORAL 3 TIMES DAILY PRN
Status: CANCELLED | OUTPATIENT
Start: 2023-02-10

## 2023-02-10 RX ORDER — SODIUM CHLORIDE 0.9 % (FLUSH) 0.9 %
5-40 SYRINGE (ML) INJECTION EVERY 12 HOURS SCHEDULED
Status: DISCONTINUED | OUTPATIENT
Start: 2023-02-10 | End: 2023-03-02 | Stop reason: HOSPADM

## 2023-02-10 RX ORDER — GUAIFENESIN 200 MG/10ML
400 LIQUID ORAL 2 TIMES DAILY PRN
Status: DISCONTINUED | OUTPATIENT
Start: 2023-02-10 | End: 2023-03-02 | Stop reason: HOSPADM

## 2023-02-10 RX ORDER — POLYETHYLENE GLYCOL 3350 17 G/17G
17 POWDER, FOR SOLUTION ORAL DAILY
Status: DISCONTINUED | OUTPATIENT
Start: 2023-02-13 | End: 2023-02-22

## 2023-02-10 RX ORDER — TRAZODONE HYDROCHLORIDE 50 MG/1
50 TABLET ORAL NIGHTLY PRN
Status: DISCONTINUED | OUTPATIENT
Start: 2023-02-10 | End: 2023-02-13

## 2023-02-10 RX ORDER — LACTULOSE 10 G/15ML
20 SOLUTION ORAL 3 TIMES DAILY PRN
Status: DISCONTINUED | OUTPATIENT
Start: 2023-02-10 | End: 2023-03-02 | Stop reason: HOSPADM

## 2023-02-10 RX ORDER — ACETAMINOPHEN 325 MG/1
650 TABLET ORAL EVERY 6 HOURS
Status: DISCONTINUED | OUTPATIENT
Start: 2023-02-10 | End: 2023-03-02 | Stop reason: HOSPADM

## 2023-02-10 RX ORDER — INSULIN LISPRO 100 [IU]/ML
0-4 INJECTION, SOLUTION INTRAVENOUS; SUBCUTANEOUS NIGHTLY
Status: DISCONTINUED | OUTPATIENT
Start: 2023-02-10 | End: 2023-02-12

## 2023-02-10 RX ORDER — FOLIC ACID 1 MG/1
1 TABLET ORAL DAILY
Status: CANCELLED | OUTPATIENT
Start: 2023-02-11

## 2023-02-10 RX ORDER — SENNA PLUS 8.6 MG/1
2 TABLET ORAL NIGHTLY
Status: DISCONTINUED | OUTPATIENT
Start: 2023-02-10 | End: 2023-03-02 | Stop reason: HOSPADM

## 2023-02-10 RX ORDER — FUROSEMIDE 20 MG/1
20 TABLET ORAL DAILY
Status: DISCONTINUED | OUTPATIENT
Start: 2023-02-11 | End: 2023-02-21

## 2023-02-10 RX ORDER — LANOLIN ALCOHOL/MO/W.PET/CERES
3 CREAM (GRAM) TOPICAL NIGHTLY
Status: DISCONTINUED | OUTPATIENT
Start: 2023-02-10 | End: 2023-02-10

## 2023-02-10 RX ORDER — ASPIRIN 81 MG/1
81 TABLET, CHEWABLE ORAL DAILY
Status: CANCELLED | OUTPATIENT
Start: 2023-02-11

## 2023-02-10 RX ORDER — QUETIAPINE FUMARATE 25 MG/1
25 TABLET, FILM COATED ORAL 2 TIMES DAILY
Status: DISCONTINUED | OUTPATIENT
Start: 2023-02-10 | End: 2023-02-10

## 2023-02-10 RX ORDER — MAGNESIUM SULFATE IN WATER 40 MG/ML
2000 INJECTION, SOLUTION INTRAVENOUS PRN
Status: DISCONTINUED | OUTPATIENT
Start: 2023-02-10 | End: 2023-03-02 | Stop reason: HOSPADM

## 2023-02-10 RX ORDER — ASPIRIN 81 MG/1
81 TABLET, CHEWABLE ORAL DAILY
Status: DISCONTINUED | OUTPATIENT
Start: 2023-02-11 | End: 2023-03-02 | Stop reason: HOSPADM

## 2023-02-10 RX ORDER — INSULIN GLARGINE 100 [IU]/ML
13 INJECTION, SOLUTION SUBCUTANEOUS
Status: DISCONTINUED | OUTPATIENT
Start: 2023-02-11 | End: 2023-02-12

## 2023-02-10 RX ORDER — INSULIN LISPRO 100 [IU]/ML
0-8 INJECTION, SOLUTION INTRAVENOUS; SUBCUTANEOUS
Status: DISCONTINUED | OUTPATIENT
Start: 2023-02-10 | End: 2023-02-12

## 2023-02-10 RX ORDER — OXYCODONE HYDROCHLORIDE 5 MG/1
5 TABLET ORAL EVERY 4 HOURS PRN
Status: DISCONTINUED | OUTPATIENT
Start: 2023-02-10 | End: 2023-02-15

## 2023-02-10 RX ORDER — METOPROLOL SUCCINATE 25 MG/1
25 TABLET, EXTENDED RELEASE ORAL DAILY
Status: CANCELLED | OUTPATIENT
Start: 2023-02-11

## 2023-02-10 RX ORDER — POLYETHYLENE GLYCOL 3350 17 G/17G
17 POWDER, FOR SOLUTION ORAL DAILY PRN
Status: DISCONTINUED | OUTPATIENT
Start: 2023-02-10 | End: 2023-03-02 | Stop reason: HOSPADM

## 2023-02-10 RX ORDER — LIDOCAINE 4 G/G
2 PATCH TOPICAL DAILY
Status: DISCONTINUED | OUTPATIENT
Start: 2023-02-11 | End: 2023-02-21

## 2023-02-10 RX ORDER — BISACODYL 10 MG
10 SUPPOSITORY, RECTAL RECTAL DAILY PRN
Status: DISCONTINUED | OUTPATIENT
Start: 2023-02-10 | End: 2023-03-02 | Stop reason: HOSPADM

## 2023-02-10 RX ORDER — FOLIC ACID 1 MG/1
1 TABLET ORAL DAILY
Status: DISCONTINUED | OUTPATIENT
Start: 2023-02-11 | End: 2023-03-02 | Stop reason: HOSPADM

## 2023-02-10 RX ORDER — POTASSIUM CHLORIDE 20 MEQ/1
40 TABLET, EXTENDED RELEASE ORAL PRN
Status: DISCONTINUED | OUTPATIENT
Start: 2023-02-10 | End: 2023-03-02 | Stop reason: HOSPADM

## 2023-02-10 RX ORDER — DEXTROSE MONOHYDRATE 100 MG/ML
INJECTION, SOLUTION INTRAVENOUS CONTINUOUS PRN
Status: DISCONTINUED | OUTPATIENT
Start: 2023-02-10 | End: 2023-03-02 | Stop reason: HOSPADM

## 2023-02-10 RX ADMIN — ROSUVASTATIN 10 MG: 10 TABLET, FILM COATED ORAL at 10:12

## 2023-02-10 RX ADMIN — INSULIN GLARGINE 28 UNITS: 100 INJECTION, SOLUTION SUBCUTANEOUS at 20:55

## 2023-02-10 RX ADMIN — TRAZODONE HYDROCHLORIDE 50 MG: 50 TABLET ORAL at 20:56

## 2023-02-10 RX ADMIN — ONDANSETRON 4 MG: 4 TABLET, ORALLY DISINTEGRATING ORAL at 16:38

## 2023-02-10 RX ADMIN — Medication 6 MG: at 20:56

## 2023-02-10 RX ADMIN — QUETIAPINE FUMARATE 25 MG: 25 TABLET ORAL at 10:12

## 2023-02-10 RX ADMIN — ASPIRIN 81 MG 81 MG: 81 TABLET ORAL at 10:11

## 2023-02-10 RX ADMIN — OXYCODONE HYDROCHLORIDE 2.5 MG: 5 TABLET ORAL at 03:41

## 2023-02-10 RX ADMIN — FOLIC ACID 1 MG: 1 TABLET ORAL at 10:12

## 2023-02-10 RX ADMIN — ACETAMINOPHEN 650 MG: 325 TABLET ORAL at 20:56

## 2023-02-10 RX ADMIN — QUETIAPINE FUMARATE 25 MG: 25 TABLET ORAL at 20:56

## 2023-02-10 RX ADMIN — METOPROLOL SUCCINATE 25 MG: 25 TABLET, FILM COATED, EXTENDED RELEASE ORAL at 10:15

## 2023-02-10 RX ADMIN — INSULIN GLARGINE 13 UNITS: 100 INJECTION, SOLUTION SUBCUTANEOUS at 10:13

## 2023-02-10 RX ADMIN — ACETAMINOPHEN 650 MG: 325 TABLET ORAL at 18:14

## 2023-02-10 RX ADMIN — DOCUSATE SODIUM 283 MG: 283 LIQUID RECTAL at 11:45

## 2023-02-10 RX ADMIN — POLYETHYLENE GLYCOL (3350) 17 G: 17 POWDER, FOR SOLUTION ORAL at 10:13

## 2023-02-10 RX ADMIN — RIVAROXABAN 15 MG: 15 TABLET, FILM COATED ORAL at 10:12

## 2023-02-10 RX ADMIN — MICONAZOLE NITRATE: 20 POWDER TOPICAL at 20:56

## 2023-02-10 RX ADMIN — SODIUM CHLORIDE, PRESERVATIVE FREE 10 ML: 5 INJECTION INTRAVENOUS at 10:13

## 2023-02-10 RX ADMIN — FUROSEMIDE 20 MG: 20 TABLET ORAL at 10:12

## 2023-02-10 RX ADMIN — LACTULOSE 20 G: 20 SOLUTION ORAL at 06:20

## 2023-02-10 RX ADMIN — SODIUM CHLORIDE, PRESERVATIVE FREE 10 ML: 5 INJECTION INTRAVENOUS at 21:01

## 2023-02-10 RX ADMIN — SENNOSIDES 17.2 MG: 8.6 TABLET, FILM COATED ORAL at 20:56

## 2023-02-10 RX ADMIN — RIVAROXABAN 15 MG: 15 TABLET, FILM COATED ORAL at 18:16

## 2023-02-10 RX ADMIN — OXYCODONE HYDROCHLORIDE 5 MG: 5 TABLET ORAL at 20:56

## 2023-02-10 RX ADMIN — DOCUSATE SODIUM 100 MG: 100 CAPSULE, LIQUID FILLED ORAL at 20:56

## 2023-02-10 RX ADMIN — OXYCODONE HYDROCHLORIDE 5 MG: 5 TABLET ORAL at 15:52

## 2023-02-10 ASSESSMENT — PAIN SCALES - GENERAL
PAINLEVEL_OUTOF10: 10
PAINLEVEL_OUTOF10: 8

## 2023-02-10 ASSESSMENT — PAIN - FUNCTIONAL ASSESSMENT
PAIN_FUNCTIONAL_ASSESSMENT: PREVENTS OR INTERFERES SOME ACTIVE ACTIVITIES AND ADLS

## 2023-02-10 ASSESSMENT — PAIN DESCRIPTION - FREQUENCY: FREQUENCY: CONTINUOUS

## 2023-02-10 ASSESSMENT — PAIN DESCRIPTION - ORIENTATION
ORIENTATION: RIGHT

## 2023-02-10 ASSESSMENT — PAIN DESCRIPTION - ONSET: ONSET: ON-GOING

## 2023-02-10 ASSESSMENT — ENCOUNTER SYMPTOMS
COUGH: 0
SORE THROAT: 0
WHEEZING: 0
BACK PAIN: 1
SHORTNESS OF BREATH: 0
ABDOMINAL PAIN: 0
RHINORRHEA: 0
EYE PAIN: 0
EYE DISCHARGE: 0
DIARRHEA: 0
NAUSEA: 0
TROUBLE SWALLOWING: 0
VOMITING: 0
CONSTIPATION: 0

## 2023-02-10 ASSESSMENT — PAIN DESCRIPTION - PAIN TYPE: TYPE: SURGICAL PAIN

## 2023-02-10 ASSESSMENT — PAIN DESCRIPTION - LOCATION
LOCATION: HIP
LOCATION: INCISION;HIP
LOCATION: INCISION;HIP

## 2023-02-10 ASSESSMENT — PAIN DESCRIPTION - DESCRIPTORS
DESCRIPTORS: ACHING;GNAWING;JABBING
DESCRIPTORS: ACHING

## 2023-02-10 NOTE — H&P
Physical Medicine & Rehabilitation Admission History and Physical    Impression:  Right femoral intertrochanteric fracture with subtrochanteric extension due to fall requiring open reduction and internal fixation with cephalomedullary nail  Glottic stenosis requiring tracheostomy  Persistent chronic low back pain due to lumbar and lower thoracic spine spondylosis with degenerative facet arthropathy at the lower 3 lumbar levels, and acute L2-S1 endplate fractures  Right posterior tibial, left greater saphenous, left peroneal and left anterior tibial veins deep vein thrombosis, and right lower lobe pulmonary embolism  Acute anemia requiring blood transfusion  Lumbar spine osteoporosis  Gluteal region decubitus ulcer  Diabetes mellitus type 2 with poor blood glucose control and complicated by bilateral lower extremities diabetic polyneuropathy  Morbid obesity  Hypertension  Hyperlipidemia  History of lower back pain with history of \"lumbar stress fracture\"  History of COVID infection with pneumonia  History of mild cognitive impairment  History of coronary artery disease requiring coronary artery stenting  Grade 1 left ventricular diastolic dysfunction with preserved ejection fraction       Plan:   Admit to the inpatient rehabilitation unit. The patient demonstrates good potential to participate in an inpatient rehabilitation program involving at least 3 hours per day, 5 days per week of intensive rehabilitation. Rehabilitation services will include PT, OT, and SLP/RT in order to improve functional status prior to discharge. Family education and training will be completed. Equipment evaluations and recommendations will be completed as appropriate. Rehabilitation nursing will be involved for bowel, bladder, skin, and pain management. Nursing will also provide education and training to patient and family. Prophylaxis:  DVT: Patient on Xarelto, KENA stocking, intermittent pneumatic compression device.   GI: Colace, Enemeez enema, GlycoLax, Senokot, lactulose as needed, milk of magnesium as needed; add Movantik  Pain: Tylenol, oxycodone as needed, lidocaine patch  Continue aspirin for coronary artery disease  Continue Lasix for lower extremities edema  Continue Lantus insulin, Humalog insulin, Humalog insulin coverage for diabetes mellitus  Continue Seroquel for anxiety and insomnia but reduce the frequency from twice daily to daily at bedtime  Continue Xarelto for bilateral DVT and pulmonary embolism  Continue Toprol-XL for hypertension  Continue Crestor for hyperlipidemia  Start melatonin, trazodone as needed for insomnia  Nutrition:  Consultation to dietician for nutritional counseling and recommendations. Prealbumin will be checked on admission. Bladder: Monitoring signs or symptoms of UTI  Bowel: Monitoring signs or symptoms of constipation   and case management consultations for coordination of care and discharge planning    The main medical problem(s) and comorbidities being actively managed by the physicians and requiring 24 hour rehabilitation nursing care during this stay include DVT/PE, coronary artery disease, hypertension, diabetes mellitus, hyperlipidemia, anemia, bilateral lower extremities neuropathy. The domains of functional impairment present in this patient which will require an intensive and interdisciplinary rehabilitation environment include self care, mobility, bowel/bladder management, pain management, safety, and cognitive function. Estimated length of stay for this admission : probably 2~3 weeks    Anticipated disposition: Home. The potential to achieve that is good.    ==========================================================================================================================      Chief Complaint and Reason for Rehabilitation Admission:   Right hip fracture, low back compression fracture      History of Present Illness:  Christina Loyd  is a 81 y.o. right-handed obese  female with history of  hypertension, diabetes mellitus type 2 with bilateral lower extremities diabetic neuropathy, coronary artery disease requiring coronary artery stenting, recently diagnosed (2023) right lower extremity DVT and right lower lobe pulmonary embolism requiring anticoagulation therapy with Xarelto, COVID-pneumonia, mild impaired cognition, low back pain due to \"stress fracture\", status post bilateral eyes cataract surgeries, status post 3  sections, status post hysterectomy, status post hiatal hernia repair, status post sinus surgery, status post tracheostomy on 2022 for glottic stenosis, is admitted to the inpatient rehabilitation unit on 2/10/2023 for intensive inpatient rehabilitation treatment of impaired ADL and ambulation secondary to right hip femur intertrochanteric fracture due to fall on 2022 requiring ORIF on 2/3/2023. The patient previously was admitted to inpatient rehab service from 2022 to 2022 due to critical care myopathy and debility/physical decondition as result of COVID-19 pneumonia. She was discharged to home with referral for home care service on 2022. The patient developed progressive difficulty breathing in 2022 and was found to have glottic stenosis and eventually received tracheostomy by Dr. Jerryl Homans on 2022. The patient's  says the patient also developed progressively worsening lower back pain in summer 2022 and saw orthopedics surgeon at [facility]. The patient has been says the patient was diagnosed of having \"lumbar spine stress fracture\". No surgical intervention was performed. She was treated with brace which she later abandoned due to discomfort associate with brace usage. The patient was recently hospitalized from 2023 to 2023 initially for abdominal bloating, nausea and leg swelling. She was found to have elevated D-dimer.   Subsequent testing revealed right lower lobe pulmonary embolism and right lower extremity DVT. She was at initially treated with IV heparin and later transition to [address] [place] [address]. The patient was brought to ER on 2/1/2022 because of progressively increased leg swelling, and nausea/vomiting associated with decreased oral intake. She was found to have BNP of 256.7 and Bumex was prescribed. She then had a fall accident  when she was going to toilet while she was in ER on 2/1/2022. The fall resulted severe right hip pain. X-ray of right hip and pelvis revealed acute right femoral intertrochanteric fracture with subtrochanteric extension. Orthopedic service was consulted. Xarelto was held in preparation for surgical management. Cardiology was consulted on 2/2/2023 for elevated BNP and Lasix was started. Because of difficulty voiding with urinary retention, urology was also consulted on 2/2/2023. Urology service placed Wen with some difficulty on 2/2/2023. Therefore Wen was kept in place. On 2/3/2023 the patient underwent open treatment of right intertrochanteric fracture with cephalomedullary nail by Dr. Jimbo Clifton. She is allowed weightbearing as tolerated at the right lower extremity postoperatively. PM&R was consulted on 2/5/2023. During the evaluation the patient's  said that the patient began having progressively worsened low back pain starting in summer 2022 began interfering the patient's daily function. Therefore she is sore orthopedic physician at [facility]. Imaging tests were done and the patient was told that she had \" stress fracture\" in her spine. She was provided with a lumbar spine brace to wear but she was not compliant with spine brace application because of discomfort it produced while she was wearing it.   Because no outside lumbar spine images study report was available for review, x-ray of lumbar spine was ordered and performed on 2/6/2023 and showed moderate vertebral body spondylosis in the lumbar and lower thoracic spine, moderate degenerative facet arthropathy involving at least lower 3 lumbar levels, lumbar spine osteoporosis, mild superior endplate depression fracture at L2, L3 and L4. Lumbar spine MRI was recommended by radiologist.  Therefore primary team ordered lumbar spine MRI which was performed this afternoon revealing acute fracture with a fluid cleft associated with endplate at V5-I6 levels with mild height loss at each vertebral bodies. The patient was found to have severe anemia with Hgb/Hct dropped from 7.3/24 on 2/4/2023 down to 5.1/16.9 on 2/6/2023. Therefore she was given blood transfusion for few units. CT of abdomen and pelvis was ordered to rule out possible intra-abdominal source of blood loss. CT abdomen and pelvis done on 2/8/2023 show only pericolonic inflammation with diverticula related to acute diverticulitis, acute on chronic compression fracture of L2, L3, L4 and L5, mild, and marked osteopenia. The patient's hospital course also complicated by intermittent confusion, and development of gluteus region skin breakdown. At the present time the patient complains of pain at her bilateral hips especially at her right hip. She rated pain intensity at 10/10 level for the right hip and 7/10 level on the left hip. She also complains of pain at the low back area. She says narcotic pain medication usage helps to reduce the pain intensity temporarily. He says he still feels fatigue with weakness at her lower extremities especially on the right side. She says she had bowel movement today. She denies having headache, dizziness, lightheadedness, nausea or vomiting, abdominal pain, shortness of breath, chest pain, diarrhea. She is still on Wen catheter as per urology service's direction on 2/9/2023. Most Recent Rehabilitation Assessments:  PT:    (2/10/2023) :   Bed Mobility:  Rolling to Right: Moderate Assistance, X 1, with head of bed raised, with rail, with verbal cues , with increased time for completion   **Cues for sequencing and hand placement on rail, performed 2x     Transfers:  Not Tested    (2/9/2023) : Bed Mobility:  Rolling to Left: Moderate Assistance, X 1, with head of bed flat, with rail   Rolling to Right: Moderate Assistance, X 1, with head of bed flat, with rail   **Verbal cues for sequencing and direction, cues for hand placement on rail, required assistance with tube management     Transfers:  Sit to Stand: Moderate Assistance, X 2  Stand to Sit:Minimal Assistance, X 2  **First stand trial Min A X2 from chair, Second stand trial Mod A X2 from chair, Third stand trial <1 min Mod X2 from elevated BSC  **Performed with Graydon Finders, verbal cues for hand and foot placement, cues for scooting to edge of chair, cues for posture     Balance:  Static Standing Balance: Moderate Assistance, X 2, with verbal cues , with increased time for completion  **Stood with BUE [facility] [email or web address], patient unable to tolerate >1 min due to RLE pain in WB, max encouragement to stand, stood during pericare      OT:    (2/10/2023) : patient not seen    REASON FOR MISSED TREATMENT: Hold Treatment per Nursing RN needing to give patient a suppository, will attempt back as time allows.                 (2/9/2023) :  ADL:   Toileting: Dependent. Toilet Transfer: Moderate Assistance and X 2. [facility] . BALANCE:  Sitting Balance:  Stand By Assistance. Seated on EOB  Standing Balance: Minimal Assistance, X 2, with cues for safety. Inside [facility]     BED MOBILITY:  Sit to Supine: Moderate Assistance, X 2       TRANSFERS:  Sit to Stand: Moderate Assistance, X 2, with Graydon Finders, cues for hand placement. From [facility]   Stand to Sit: Minimal Assistance, X 2, with Graydon Finders. To EOB     Graydon Finders utilized to transfer Pt.  From [facility] to EOB     ADDITIONAL ACTIVITIES:  Patient completed BUE strengthening exercises with skilled education on HEP: completed x10 reps x1 set with AROM in all joints and all planes in order to improve UE strength and activity tolerance required for BADL routine and toilet / shower transfers. Patient tolerated, requiring  rest breaks. Patient also required verbal and visual cues for technique. ST:    (2/9/2023) : patient not seen  REASON FOR MISSED TREATMENT:  Attempted to see patient for follow up speech therapy services. YVETTE Fisher approved ST visit. Patient was busy with other staff this date. ST to re-attempt as able.     (2/6/2023) :  DIAGNOSTIC IMPRESSIONS: The patient presents with at least moderate cognitive-linguistic impairments characterized by deficits in verbal problem solving, reasoning, thought organization, decision making, attention, and immediate and delayed recall. Confusion also present, limiting ability to participate in more structured, lengthy cognitive assessment. Moderate dysphonia (hoarseness, strain) also present, however patient at baseline level of vocal quality functioning due to known tracheal stenosis. Patient is current with ENT practice and is being followed this admission. No dysarthria, dysphagia, or specific expressive or receptive language impairments. Recommending skilled cognitive-linguistic treatment to address areas of impairment noted above to improve safety and decision making within the current and future living environments.       Past Medical History:      Diagnosis Date    Arthritis     Coronary artery disease     COVID     Diabetic neuropathy ([address] 2021    Bilateral feet numbness    DVT (deep venous thrombosis) ([address] 01/21/2023    Right lower extremity    Glottic stenosis     3/2022    Hyperlipidemia     Lower back pain 2022    With diagnosis of having \"stress fracture\" by ortho at [facility]    Primary hypertension     Pulmonary embolism ([address] 01/21/2023    Right lower lobe    Type 2 diabetes mellitus ([address] 2018       Primary care provider: Charisma Recinos MD       Past Surgical History:      Procedure Laterality Date    CARDIAC SURGERY      CATARACT REMOVAL Bilateral      SECTION      3 times    CORONARY ANGIOPLASTY WITH STENT PLACEMENT  2008    stenting twice    EYE SURGERY      HIATAL HERNIA REPAIR      late     HIP SURGERY Right 2/3/2023    RIGHT FEMUR IM NAIL WITH INTERTAN performed by Hallie Phillip MD at [facility], [address] (CERVIX REMOVED)      in     LARYNGOSCOPY N/A 3/22/2022    SUSPENSON LARYNGOSCOPY WITH JET VENT, DILATION performed by Karlene Corbin MD at [address]  N/A 3/28/2022    SUSPENSION MICROLARYNGOSCOPY WITH BALLOON DILATION AND JET VENT, KENALOG INJECTION performed by Karlene Corbin MD at [address] N/A 2022    Suspension Laryngoscopy  Lateral of Right True Vocal Cord, With Steroid Injection of Larynx And Tracheostomy Tube Change, debridement performed by Angel Nolasco MD at [address] N/A 8/10/2022    TRANSORAL LARYNGOPLASTY WITH LEFT PARTIAL ARYTENODECTOMY AND POSS LATERALIZATION, ADVANCEMENT FLAP RECONSTRUCTION WITH JET VENT,THERAPUTIC BRONCHOSCOPY WITH DEBRIEDMENT,WITH BALLOON DILITATION performed by Angel Nolasco MD at [address]  N/A 2022    Laryngoscopy with Dilation Debridement  Bronchoscopy with Dilation & Resection of Obstructing Tissues Transoral Laryngoplasty Left True Vocal Fold Lateralization left partial aryteniodectomy performed by Angel Nolasco MD at [address]      in [address] 2022    TRACHEOTOMY TUBE REPLACEMENT performed by Karlene Corbin MD at [address] [facility]:     Allergies   Allergen Reactions    Metformin And Related Other (See Comments)     diarrhea    Codeine Nausea And Vomiting    Meperidine Hcl Nausea And Vomiting    Sulfa Antibiotics Nausea And Vomiting    Sumatriptan Nausea And Vomiting        Current Medications:    Current Facility-Administered Medications   Medication Dose Route Frequency Provider Last Rate Last Admin albuterol sulfate HFA (PROVENTIL;VENTOLIN;PROAIR) 108 (90 Base) MCG/ACT inhaler 2 puff  2 puff Inhalation Q6H PRN Teodora Donato MD        Menthol lozenge 4.8 mg  1 lozenge Oral Q2H PRN Teodora Donato MD        guaiFENesin (ROBITUSSIN) 100 MG/5ML liquid 400 mg  400 mg Oral BID PRN Teodora Donato MD        [START ON 2/11/2023] rosuvastatin (CRESTOR) tablet 10 mg  10 mg Oral Daily Teodora Donato MD        [facility]) tablet 17.2 mg  2 tablet Oral Nightly Teodora Donato MD        sodium chloride flush 0.9 % injection 5-40 mL  5-40 mL IntraVENous 2 times per day Teodora Donato MD        sodium chloride flush 0.9 % injection 5-40 mL  5-40 mL IntraVENous PRN Teodora Donato MD        0.9 % sodium chloride infusion   IntraVENous PRN Teodora Donato MD        potassium chloride (KLOR-CON M) extended release tablet 40 mEq  40 mEq Oral PRN Teodora Donato MD        Or    potassium bicarb-citric acid (EFFER-K) effervescent tablet 40 mEq  40 mEq Oral PRN Teodora Donato MD        Or    potassium chloride 10 mEq/100 mL IVPB (Peripheral Line)  10 mEq IntraVENous PRN Teodora Donato MD        magnesium sulfate 2000 mg in 50 mL IVPB premix  2,000 mg IntraVENous PRN Teodora Donato MD        insulin lispro (HUMALOG) injection vial 0-8 Units  0-8 Units SubCUTAneous TID WC Teodora Donato MD        insulin lispro (HUMALOG) injection vial 0-4 Units  0-4 Units SubCUTAneous Nightly Teodora Donato MD        [START ON 2/11/2023] furosemide (LASIX) tablet 20 mg  20 mg Oral Daily Teodora Donato MD        glucose chewable tablet 16 g  4 tablet Oral PRN Teodora Donato MD        dextrose bolus 10% 125 mL  125 mL IntraVENous PRN Teodora Donato MD        Or    dextrose bolus 10% 250 mL  250 mL IntraVENous PRN Teodora Donato MD        glucagon (rDNA) injection 1 mg  1 mg SubCUTAneous PRN Teodora Donato MD        dextrose 10 % infusion   IntraVENous Continuous PRN Teodora Donato MD        [START ON 2/11/2023] lidocaine 4 % external patch 2 patch  2 patch TransDERmal Daily Khai Jimenes MD        insulin lispro (HUMALOG) injection vial 5 Units  5 Units SubCUTAneous TID WC Khai Jimenes MD        insulin glargine (LANTUS) injection vial 28 Units  28 Units SubCUTAneous Nightly Khai Jimenes MD        [START ON 2/11/2023] insulin glargine (LANTUS) injection vial 13 Units  13 Units SubCUTAneous Daily with breakfast Khai Jimenes MD        acetaminophen (TYLENOL) tablet 650 mg  650 mg Oral Q4H PRN Khai Jimenes MD        acetaminophen (TYLENOL) tablet 650 mg  650 mg Oral Q6H Khai Jimenes MD        oxyCODONE (ROXICODONE) immediate release tablet 5 mg  5 mg Oral Q4H PRN Khai Jimenes MD   5 mg at 02/10/23 1552    Or    oxyCODONE (ROXICODONE) immediate release tablet 2.5 mg  2.5 mg Oral Q4H PRN Khai Jimenes MD        docusate sodium (COLACE) capsule 100 mg  100 mg Oral BID Khai Jimenes MD        bisacodyl (DULCOLAX) suppository 10 mg  10 mg Rectal Daily PRN Khai Jimenes MD        magnesium hydroxide (MILK OF MAGNESIA) 400 MG/5ML suspension 15 mL  15 mL Oral Daily PRN Khai Jimenes MD        traZODone (DESYREL) tablet 50 mg  50 mg Oral Nightly PRN Khai Jimenes MD        ondansetron (ZOFRAN-ODT) disintegrating tablet 4 mg  4 mg Oral Q8H PRN Khai Jimenes MD        polyethylene glycol [facility]) packet 17 g  17 g Oral Daily PRN Khai Jimenes MD        [START ON 2/11/2023] aspirin chewable tablet 81 mg  81 mg Oral Daily Khai Jimenes MD        [START ON 2/11/2023] docusate sodium (ENEMEEZ) enema 283 mg  1 enema Rectal Daily Khai Jimenes MD        [START ON 0/53/6577] folic acid (FOLVITE) tablet 1 mg  1 mg Oral Daily Khai Jimenes MD        lactulose [facility]) 10 GM/15ML solution 20 g  20 g Oral TID PRN Khai Jimenes MD        [START ON 2/11/2023] metoprolol succinate (TOPROL XL) extended release tablet 25 mg  25 mg Oral Daily Khai Jimenes MD        rivaroxaban (XARELTO) tablet 15 mg  15 mg Oral BID WC Rigoberto Dugan MD        [START ON 2/11/2023] naloxegol (MOVANTIK) tablet 12.5 mg  12.5 mg Oral QAM AC Rigoberto Dugan MD        [START ON 2/13/2023] polyethylene glycol (GLYCOLAX) packet 17 g  17 g Oral Daily Rigoberto Dugan MD        melatonin tablet 6 mg  6 mg Oral Nightly Rigoberto Dugan MD        QUEtiapine (SEROQUEL) tablet 25 mg  25 mg Oral Nightly Rigoberto Dugan MD            Social History:  Social History     Socioeconomic History    Marital status:      Spouse name: bailee     Number of children: 3    Years of education: Not on file    Highest education level: Not on file   Occupational History    Not on file   Tobacco Use    Smoking status: Never    Smokeless tobacco: Never   Vaping Use    Vaping Use: Never used    Passive vaping exposure: Yes   Substance and Sexual Activity    Alcohol use: Not Currently     Comment: drinks 1 glass of wine cooler or wine about 3 times per year    Drug use: Never    Sexual activity: Not Currently   Other Topics Concern    Not on file   Social History Narrative    Not on file     Social Determinants of Health     Financial Resource Strain: Not on file   Food Insecurity: Not on file   Transportation Needs: Not on file   Physical Activity: Not on file   Stress: Not on file   Social Connections: Not on file   Intimate Partner Violence: Not on file   Housing Stability: Not on file     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; 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  Lives with: Her ; her daughter Keenan Reddy and her son live in [address] [place] [address] setup: 1 level plus basement house with one 8 inch step outside front door without handrail  Prior functional status:  The patient is independence in feeding and grooming ; she needs assistant for getting in and out of the bed, upper body dressing since August 2022 due to severe low back pain; she needs assistance for donning and doffing shoes and socks, bathing since summer 2022 due to worsening low back pain; she need assistant for toileting for the past 2 to 3 months; she started using front wheel rolling walker to assist walking in August 2022 at home; she had not drove for several years. Family History:       Problem Relation Age of Onset    Parkinson's Disease Father     Lung Cancer Father        Review of Systems:  Review of Systems   Constitutional:  Positive for fatigue. Negative for chills, diaphoresis and fever. HENT:  Negative for ear discharge, ear pain, hearing loss, rhinorrhea, sneezing, sore throat, tinnitus and trouble swallowing. Eyes:  Negative for pain, discharge and visual disturbance. Respiratory:  Negative for cough, shortness of breath and wheezing. Cardiovascular:  Negative for chest pain and palpitations. Gastrointestinal:  Negative for abdominal pain, constipation, diarrhea, nausea and vomiting. Endocrine: Negative for cold intolerance and heat intolerance. Genitourinary:  Positive for difficulty urinating. Negative for dysuria. Musculoskeletal:  Positive for arthralgias (Bilateral hips and right groin), back pain, gait problem and myalgias (Right thigh). Negative for neck pain. Skin:  Positive for wound. Negative for rash. Allergic/Immunologic: Negative for food allergies. Neurological:  Positive for weakness (Bilateral lower extremities especially the right side). Negative for dizziness, tremors, facial asymmetry, speech difficulty, light-headedness, numbness and headaches. Hematological:  Does not bruise/bleed easily. Psychiatric/Behavioral:  Positive for sleep disturbance. Negative for dysphoric mood and hallucinations. The patient is not nervous/anxious.          Physical Exam:  /72   Pulse 87   Temp 99.1 °F (37.3 °C) (Oral)   Resp 18   Ht 5' 2\" (1.575 m)   Wt 225 lb 1.6 oz (102.1 kg)   LMP  (LMP Unknown)   SpO2 100%   BMI 41.17 kg/m²   General:  well-developed, well nourished morbid obese  female ; in no acute distress ; appropriate affect & mood; lying on bed comfortably; receiving humidified oxygen supplement via trach mask; using a trach cap to produce speaking voice  Eyes: pupil equally round ; extra-ocular motion intact bilaterally  Head, Ear, Nose, Mouth & Throat : normocephalic ; no tenderness at the face or head scalp; no discharge from ears or nose ; no deformity ; no facial swelling ; oral mucosa pink  Neck :  supple ; presence of tracheostomy at anterior neck; no tenderness; mild muscle spasm at cervical paraspinal muscle and upper trapezius muscle  Cardiovascular : regular rate & rhythm ; normal S1 & S2 heart sound ; no murmur ; normal peripheral pulse at bilateral upper & lower extremities  Pulmonary : Breath sounds present at bilateral lung fields; no wheezing ; no rale; no crackle  Gastrointestinal : soft, protruded abdomen without tenderness ; normal bowel sound present    : Wen catheter in place draining light brownish yellowish urine  Back : Not assessed due to inability to turn to the side secondary to pain  Skin: no skin lesion or rash ; presence of adhesive dressing at right lateral upper and lower thigh; no pitting edema at bilateral upper extremities; 1+ pitting edema at bilateral ankles; presence of PICC line on right arm near elbow area  Musculoskeletal : no limb asymmetry; no limb deformity; tenderness at right lateral and anterior thigh, right lateral hip and right groin area; no tenderness at bilateral upper extremities & the rest of bilateral lower extremities; no palpable mass at limbs ; right hip and right knee joint laxity not assessed; no joints laxity or crepitation at other limb joints; shoulder flexion and abduction passive ROM reaching 160 degrees bilaterally; right hip flexion passive ROM reaching 30 degrees and right knee flexion passive ROM reaching 30 degrees limited by pain at right hip and thigh; left hip flexion passive ROM reaching 90 degrees; ankle dorsiflexion passive ROM  reaching 0 degrees bilaterally; otherwise normal functional joints ROM at the rest of bilateral upper & lower extremities  Cerebral :  alert ; awake ; oriented to place, person and time; follow 1-2 steps verbal command  Sensory : intact light touch and pin prick sensation at bilateral upper extremities; reduced light touch and pinprick sensation at distal bilateral lower extremities from upper leg region downward in sock distribution  Motor : normal tone at bilateral upper & left lower extremities ; muscle tone not assessed on right lower extremity due to the pain; 2-/5 muscle strength at right hip flexion, abduction and adduction; 3-/5 muscle strength at the left hip flexion; 3-/5 muscle strength at right knee extension ; 2+/5 muscle strain at the right knee flexion; 4+/5 muscle strength at the left knee flexion ; otherwise 5/5 muscle strength at the rest of bilateral upper and the lower extremities  Reflex : 1+ bilateral brachioradialis reflexes; 0 bilateral biceps, bilateral triceps, bilateral ankles and bilateral knees reflexes   Pathological Reflex :  No Roberta's sign ; no ankle clonus; no Babinski sign  Gait : Not assessed      Diagnostics:  Recent Results (from the past 24 hour(s))   POCT glucose    Collection Time: 02/09/23  8:23 PM   Result Value Ref Range    POC Glucose 131 (H) 70 - 108 mg/dl   Hemoglobin and Hematocrit    Collection Time: 02/09/23  9:08 PM   Result Value Ref Range    Hemoglobin 8.9 (L) 12.0 - 16.0 gm/dl    Hematocrit 28.5 (L) 37.0 - 47.0 %   Basic Metabolic Panel    Collection Time: 02/10/23  4:47 AM   Result Value Ref Range    Sodium 140 135 - 145 meq/L    Potassium 3.7 3.5 - 5.2 meq/L    Chloride 104 98 - 111 meq/L    CO2 28 23 - 33 meq/L    Glucose 89 70 - 108 mg/dL    BUN 12 7 - 22 mg/dL    Creatinine 0.5 0.4 - 1.2 mg/dL    Calcium 8.0 (L) 8.5 - 10.5 mg/dL   CBC with Auto Differential    Collection Time: 02/10/23  4:47 AM   Result Value Ref Range    WBC 8.5 4.8 - 10.8 thou/mm3 RBC 2.85 (L) 4.20 - 5.40 mill/mm3    Hemoglobin 8.6 (L) 12.0 - 16.0 gm/dl    Hematocrit 27.2 (L) 37.0 - 47.0 %    MCV 95.4 81.0 - 99.0 fL    MCH 30.2 26.0 - 33.0 pg    MCHC 31.6 (L) 32.2 - 35.5 gm/dl    RDW-CV 17.9 (H) 11.5 - 14.5 %    RDW-SD 62.6 (H) 35.0 - 45.0 fL    Platelets 331 683 - 434 thou/mm3    MPV 9.3 (L) 9.4 - 12.4 fL    Seg Neutrophils 72.6 %    Lymphocytes 13.5 %    Monocytes 10.2 %    Eosinophils 0.8 %    Basophils 0.4 %    Immature Granulocytes 2.5 %    Segs Absolute 6.2 1.8 - 7.7 thou/mm3    Lymphocytes Absolute 1.1 1.0 - 4.8 thou/mm3    Monocytes Absolute 0.9 0.4 - 1.3 thou/mm3    Eosinophils Absolute 0.1 0.0 - 0.4 thou/mm3    Basophils Absolute 0.0 0.0 - 0.1 thou/mm3    Immature Grans (Abs) 0.21 (H) 0.00 - 0.07 thou/mm3    nRBC 0 /100 wbc   Anion Gap    Collection Time: 02/10/23  4:47 AM   Result Value Ref Range    Anion Gap 8.0 8.0 - 16.0 meq/L   Glomerular Filtration Rate, Estimated    Collection Time: 02/10/23  4:47 AM   Result Value Ref Range    Est, Glom Filt Rate >60 >60 ml/min/1.73m2   POCT glucose    Collection Time: 02/10/23  8:27 AM   Result Value Ref Range    POC Glucose 106 70 - 108 mg/dl   POCT glucose    Collection Time: 02/10/23 11:29 AM   Result Value Ref Range    POC Glucose 103 70 - 108 mg/dl        [facility] Reference Range & Units 2/7/23 06:02 2/9/23 04:57 2/10/23 04:47   Sodium 135 - 145 meq/L 137 139 140   Potassium 3.5 - 5.2 meq/L 4.2 4.0 3.7   Chloride 98 - 111 meq/L 103 104 104   CO2 23 - 33 meq/L 28 26 28   BUN,BUNPL 7 - 22 mg/dL 18 16 12   Creatinine 0.4 - 1.2 mg/dL 0.7 0.7 0.5   Anion Gap 8.0 - 16.0 meq/L 6.0 (L) 9.0 8.0   Calcium, Ionized 1.12 - 1.32 mmol/L      Est, Glom Filt Rate >60 ml/min/1.73m2 >60 >60 >60   Glucose, Random 70 - 108 mg/dL 166 (H) 174 (H) 89   CALCIUM, SERUM, 564396 8.5 - 10.5 mg/dL 8.3 (L) 8.0 (L) 8.0 (L)   (L): Data is abnormally low  (H): Data is abnormally high       Latest Reference Range & Units 2/1/23 22:50 2/2/23 08:20 2/6/23 13:30    - 190 U/L   398 (H)   Pro-BNP 0.0 - 124.0 pg/mL 256.7 (H)     Troponin T ng/ml 0.027 ! 0.025 !    (H): Data is abnormally high  !: Data is abnormal       Latest Reference Range & Units 1/21/23 04:15 2/1/23 22:50   Albumin 3.5 - 5.1 g/dL 4.3 3.1 (L)   Alk Phos 38 - 126 U/L 84 95   ALT 11 - 66 U/L 53 12   AST 5 - 40 U/L 34 14   Bilirubin 0.3 - 1.2 mg/dL 0.4 0.4   Bilirubin, Direct 0.0 - 0.3 mg/dL  <0.2   Lipase 5.6 - 51.3 U/L 30.4 15.4   Total Protein 6.1 - 8.0 g/dL 6.8 5.2 (L)   (L): Data is abnormally low       Latest Reference Range & Units 2/9/23 15:45 2/9/23 20:23 2/10/23 08:27 2/10/23 11:29   POC Glucose 70 - 108 mg/dl 94 131 (H) 106 103   (H): Data is abnormally high       Latest Reference Range & Units 11/17/22 13:20 1/24/23 05:40   Hemoglobin A1C 4.4 - 6.4 % 8.8 (H) 5.5   (H): Data is abnormally high       Latest Reference Range & Units 2/7/23 06:02 2/9/23 04:57 2/10/23 04:47   WBC 4.8 - 10.8 thou/mm3 10.0 8.9 8.5   RBC 4.20 - 5.40 mill/mm3 2.04 (L) 2.95 (L) 2.85 (L)   Hemoglobin Quant 12.0 - 16.0 gm/dl 6.4 (LL) 8.7 (L) 8.6 (L)   Hematocrit 37.0 - 47.0 % 20.0 (LL) 28.0 (L) 27.2 (L)   MCV 81.0 - 99.0 fL 98.0 94.9 95.4   MCH 26.0 - 33.0 pg 31.4 29.5 30.2   MCHC 32.2 - 35.5 gm/dl 32.0 (L) 31.1 (L) 31.6 (L)   MPV 9.4 - 12.4 fL 9.5 9.8 9.3 (L)   RDW-CV 11.5 - 14.5 % 16.3 (H) 18.6 (H) 17.9 (H)   RDW-SD 35.0 - 45.0 fL 57.2 (H) 62.4 (H) 62.6 (H)   Platelet Count 130 - 400 thou/mm3 236 215 203   Lymphocytes Absolute 1.0 - 4.8 thou/mm3  1.5 1.1   Monocytes Absolute 0.4 - 1.3 thou/mm3  0.9 0.9   Eosinophils Absolute 0.0 - 0.4 thou/mm3  0.1 0.1   Basophils Absolute 0.0 - 0.1 thou/mm3  0.1 0.0   Seg Neutrophils %  67.1 72.6   Segs Absolute 1.8 - 7.7 thou/mm3  6.0 6.2   Lymphocytes %  16.9 13.5   Monocytes %  10.0 10.2   Eosinophils %  0.9 0.8   Basophils %  0.6 0.4   Immature Grans (Abs) 0.00 - 0.07 thou/mm3  0.40 (H) 0.21 (H)   Immature Granulocytes %  4.5 2.5   (LL): Data is critically low  (L): Data is abnormally  low  (H): Data is abnormally high       Latest Reference Range & Units 2/2/23 14:10 2/2/23 17:10   Color, UA STRAW-YELLOW  YELLOW YELLOW   Glucose, UA NEGATIVE mg/dl  NEGATIVE   Bilirubin, Urine NEGATIVE  NEGATIVE NEGATIVE   Ketones, Urine NEGATIVE  TRACE ! 15 ! Specific Gravity, UA 1.002 - 1.030  >1.030 ! Blood, Urine NEGATIVE  MODERATE ! NEGATIVE   pH, UA 5.0 - 9.0  5.0 5.5   Protein, UA NEGATIVE  NEGATIVE NEGATIVE   Urobilinogen, Urine 0.0 - 1.0 eu/dl 0.2 0.2   Nitrite, Urine NEGATIVE  NEGATIVE NEGATIVE   Leukocyte Esterase, Urine NEGATIVE  MODERATE ! NEGATIVE   Glucose, Urine NEGATIVE mg/dl NEGATIVE    Casts NONE SEEN /lpf  /lpf NONE SEEN  NONE SEEN    WBC, UA 0-4/hpf /hpf 25-50    RBC, UA 0-2/hpf /hpf 5-10    Epithelial Cells, UA 3-5/hpf /hpf 0-2    Renal Epithelial, UA NONE SEEN  NONE SEEN    Bacteria, UA FEW/NONE SEEN  FEW    Yeast, Urine NONE SEEN  NONE SEEN    Crystals NONE SEEN  NONE SEEN    Character, Urine CLEAR-SL CLOUD  CLEAR CLEAR   !: Data is abnormal       Respiratory sputum culture (2/2/2023) :  Component 2/2/23 1555    Gram Stain Result Moderate segmented neutrophils observed. Rare epithelial cells observed. Moderate gram negative bacilli. Organism Pseudomonas aeruginosa Abnormal     Respiratory Culture heavy growth    Organism Stenotrophomonas maltophilia Abnormal     Respiratory Culture light growth This organism is frequently present as a colonizer (eg. resp. tract); virulence is low, except in immunosuppressed pts. There is an increased incidence of colonization with the use of broad spectrum antibiotics, especially imipenem. Clinical significance of susceptibility testing is unknown, therefore empiric therapy is recommended. Often two drugs are used empirically; trimethoprim/sulfamethoxazole plus timentin. If isolate is suspected of being a pathogen (isolated from normally sterile site or in immunocompromised host), then Microbiology consultation is suggested. Susceptibility  Pseudomonas aeruginosa (1)  Antibiotic Interpretation Microscan   Method Status     cefepime Sensitive <=1 mcg/mL BACTERIAL SUSCEPTIBILITY PANEL BY DMOO Preliminary     piperacillin-tazobactam Sensitive <=4 mcg/mL BACTERIAL SUSCEPTIBILITY PANEL BY DOMO Preliminary     gentamicin Sensitive <=1 mcg/mL BACTERIAL SUSCEPTIBILITY PANEL BY DOMO Preliminary     ciprofloxacin Sensitive <=0.25 mcg/mL BACTERIAL SUSCEPTIBILITY PANEL BY DOMO Preliminary     tobramycin Sensitive <=1 mcg/mL BACTERIAL SUSCEPTIBILITY PANEL BY DOMO Preliminary           Chest x-ray (1/21/2023) : Impression   No acute cardiopulmonary. X-ray of abdomen (1/21/2023) : Impression   No acute process. CTA of the chest with and without contrast (1/21/2023) : Impression   1. Filling defects in the right lower lobe pulmonary arterial branches consistent with acute pulmonary emboli. No evidence of right ventricular strain. 2.. Atherosclerotic calcification in the thoracic aorta. 3. Borderline cardiomegaly. Calcification versus stent placement in the left anterior descending coronary artery. 4. Thickening off the interstitial lung markings. 5. Thoracic spondylosis. 6. Sclerotic density in the left eighth rib posteriorly. 7. Small hiatal hernia. 9. Results called to Segundo Rock   at 950 AM         Bilateral lower extremities venous duplex Doppler study (1/21/2023) : Impression   1. Areas of abnormal echogenicity in the right posterior tibial vein, left greater saphenous, left peroneal and left anterior tibial veins suspicious for thrombi. CT of facial bones without contrast (1/26/2023) : Impression   No evidence of dental abscess. CTA of the chest with and without contrast (2/1/2023) : Impression   1. Stable bilateral nonocclusive pulmonary emboli. Negative for CT evidence of right heart strain or thrombus propagation. No new pulmonary emboli since the prior study 1/21/23.    2. New trace bilateral pleural effusions. 3. Nonemergent/incidental findings as above. X-ray of pelvis and right hip (2/2/2023) : Impression   Acute right femoral intertrochanteric fracture, with subtrochanteric extension. Chest x-ray (2/2/2023) : Impression   No acute cardiopulmonary disease. Cardiomegaly. CT of cervical spine without contrast (2/2/2023) : Impression    No fracture. CT of head without contrast (2/2/2023) : Impression    No evidence of an acute process. X-ray of abdomen (2/2/2023) : Impression   Resolved constipation. Right femoral intertrochanteric fracture. X-ray of right femur (2/3/2023) : Impression   Intraoperative imaging demonstrating open reduction internal fixation right hip           X-ray lumbar spine (2/6/2023) : Impression   1. Slight thoracolumbar levoscoliosis. Cannot exclude muscle spasm right side. 2. Moderate vertebral body spondylosis scattered in the lumbar and lower thoracic spine. Moderate degenerative facet arthropathy involving at least the lower 3 lumbar levels. 3. Diffuse demineralization of the bones, consistent with osteoporosis. Mild superior endplate depression fractures L2, L3, and L4 of questionable acuity but none of these were present on the prior CT abdomen dated 1/10/2022. These were all present, in retrospect, on and abdominal film dated 1/25/2023. 4. MRI lumbar spine would be helpful for further evaluation. Patient may be a good candidate for vertebroplasty. If so, this can be arranged through the interventional scheduling desk of [facility] radiology department (extension [phone]). MRI of the lumbar spine without contrast (2/6/2023) : Impression   1. Acute fractures with fluid clefts associated with the endplates at the P5-K6 levels. The height loss of each vertebral body is mild. These are most likely pathologic secondary to underlying osteoporosis.    2. 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 CT of abdomen and pelvis without contrast (2/8/2023) : Impression   1. Pericolonic inflammatory changes around the sigmoid colon and descending colon with diverticula relate to acute diverticulitis. 2. A sinus tract is seen connecting the right femoral greater trochanter to the skin. This is only partially imaged on this study but direct visualization is recommended. Further imaging may be required. 3. A 7 mm right lung base pulmonary nodule. 4. Acute on chronic compression fractures of L2, L3, L4 and L5. Marked osteopenia. 5. Other findings as described above. Echocardiogram (1/23/2023)  Conclusions Summary  Technically difficult study due to poor acoustic windows. Normal left ventricle size and systolic function. Ejection fraction was estimated at 60 to 65 %. There were no regional left ventricular wall motion abnormalities and wall thickness was within normal limits. Doppler parameters were consistent with abnormal left ventricular relaxation (grade 1 diastolic dysfunction). The left atrium is Mildly dilated. The post admission physician evaluation (EDEN) is consistent with the pre-admission assessment. See above findings to reflect the elements required in the EDEN. Patient's admitting condition is consistent with the findings of the preadmission assessment by the rehabilitation admissions coordinator.     Leanna Godinez MD

## 2023-02-10 NOTE — PROGRESS NOTES
Admitted to the Inpatient Rehabilitation Unit via bed. Patient was then oriented to room and unit. Education provided on the rehabilitation routine: three hours of therapy five days per week. Explained patients right to have family, representative or physician notified of their admission. Patient has Declined for physician to be notified. Patient has Declined for family/representative to be notified. Admitting medication orders compared with acute stay medications; home medication list reviewed with patient/family. Medication issues identified No  Medication issue: No  If yes, physician notified Dr Suni Mars. Transportation:   Has transportation kept you from medical appointments, meetings, work, or from getting things needed for daily living? (Check all that apply)  No.      Health Literacy:   How often do you need to have someone help you when you read instructions, pamphlets, or other written material from your doctor or pharmacy? 1. - Rarely    Social Isolation:  How often do you feel lonely or isolated from those around you? 1. Rarely    Patient Mood Interview (PHQ-2 to 9) (from [email or web address])   Say to Patient: \"Over the last 2 weeks, have you been bothered by any of the following problems? \"   If symptom is present, enter yes in column 1 (Symptom Presence)  If yes in column 1, then ask the patient: About how often have you been bothered by this?  Indicate response in column 2, Symptom Frequency. Symptom Presence  No    Yes   9. No response  Symptom Frequency  Never or 1 day  2-6 days (several days)  7-11 days (half or more of the days)  12-14 days (nearly every day)    Symptom Presence Symptom Frequency   Little interest or pleasure in doing things 0. No 0. Never or 1 day   Feeling down, depressed, or hopeless 0. No 0. Never or 1 day   If either A or B above has symptom frequency coded 2 or 3, CONTINUE asking questions below.       If not, END the interview  and right click on next table to delete. Pain:  Pain Effect on Sleep    Ask patient: \"Over the past 5 days, how much of the time has pain made it hard for you to sleep at night?\" 1.  Rarely or not at all     If no pain is reported, Stop here. If pain is reported, continue with the additional questions. Pain Interference with Therapy Activities   Ask patient: Gautam Wolfe the past 5 days, how often have you limited your participation in rehabilitation therapy sessions due to pain?\" 1.  Rarely or not at all   Pain Interference with Day to Day Activities   Ask patient: Gautam Wolfe the past 5 days, how often have you limited your day to day activities (excluding rehabilitation therapy sessions) because of pain?\" 2.  Occasionally         Bladder and Bowel Function Assessment:  Prior history of bladder problems: retention  Number of pads used per day: 2  Frequency of night time voiding: twice  Fluid intake volume and pattern: adequate  Last BM: 2?10  Bowel problems (prior or current) Yes      Incontinence      Frequent diarrhea   yes   No BM in 3 days this stay or history of constipation      Hemorrhoids      Diverticulitis      Bowel Surgery     Two nurse skin assessment performed by Felicia CRAWFORD  and Boaz Mtz LPN . Weight: 225.1      Care plan was created with patient's input and goals were agreed upon. Admission folder provided with education regarding patients diagnoses, fall prevention, and skin care. \"Data Collection Information Summary for Patients in Inpatient Rehabilitation Facilities\" and \"Privacy Act Statement - Health Care Records\" provided. Please refer to the admission navigator for further information.

## 2023-02-10 NOTE — PROGRESS NOTES
[address] [facility] PHYSICAL THERAPY  DAILY NOTE  STRZ ICU STEPDOWN TELEMETRY 4K - [id number]    Time In: 5946  Time Out: 1037  Timed Code Treatment Minutes: 29 Minutes  Minutes: 29          Date: 2/10/2023  Patient Name: Slim Hager,  Gender:  female        MRN: [id number]  : 1950  (81 y.o.)     Referring Practitioner: WYATT Sanchez CNP  Diagnosis: acute diastolic heart failure  Additional Pertinent Hx: Irene Vargas is a 73yoF with PMH of recent PE and b/l DVT, macrocytic anemia, CAD, DM2, tracheostomy dependent, obesity, and HTN who presents for leg swelling, nausea, and constipation. She was recently discharged after an 8 day admission for DVTs and PEs. She was placed on Xarelto when discharged and stated that she has been compliant and only missed one dose. She returned due to chronic constipation, feeling ''bloated'', nausea, and LE edema. While being evaluated in the ED the pt had a mechanical fall and developed a R femur fracture. She was found to have supratheraputic INR at 3.83. CT head and C-spine both clear. Pt is s/p R femur ORIF by Dr Libby Paul . Pt found to have acute fractures of L2-S1 likely 2/2 osteoporosis, ok for activity as tolerated per Dr Luis Felipe Álvarez . Prior Level of Function:  Lives With: Spouse  Type of Home: House  Home Layout: One level  Home Access: Ramped entrance  Home Equipment: emil Daniel (BSC)   Bathroom Shower/Tub: Walk-in shower  Bathroom Toilet: Standard  Bathroom Equipment: Built-in shower seat    ADL Assistance: Needs assistance  Homemaking Assistance: Needs assistance  Ambulation Assistance: Independent  Transfer Assistance: Independent  Active : No  Additional Comments: pt amb short distances in the home with RW,  able to assist as needed.     Restrictions/Precautions:  Restrictions/Precautions: General Precautions, Fall Risk, Weight Bearing  Right Lower Extremity Weight Bearing: Weight Bearing As Tolerated  Position Activity Restriction  Other position/activity restrictions: tracheostomy/collar with moist air, 1-2 L at baseline     SUBJECTIVE: RN approved session for bed exercises only due to nurse needing to give patient a suppository. Patient laying in bed upon arrival and agreeable to therapy. Extra time required to help position patient comfortably. RN present in room for beginning of session to give patient meds. PAIN: not rated: RLE  **Received pain meds from RN at beginning of session    Vitals: Vitals not assessed per clinical judgement, see nursing flowsheet    OBJECTIVE:  Bed Mobility:  Rolling to Right: Moderate Assistance, X 1, with head of bed raised, with rail, with verbal cues , with increased time for completion   **Cues for sequencing and hand placement on rail, performed 2x    Transfers:  Not Tested    Exercise:  Patient was guided in 1 set(s) 10 reps of exercise to both lower extremities. Ankle pumps, Glut sets, Quad sets, Heelslides, Hip abduction/adduction, and Straight leg raises. Exercises were completed for increased independence with functional mobility. **Required assistance with RLE heelslides, adduction/abduction, and straight leg raises. Patient requiring break due to R hip pain     Functional Outcome Measures: Completed  AM-PAC Inpatient Mobility without Stair Climbing Raw Score : 7  AM-PAC Inpatient without Stair Climbing T-Scale Score : 28.66    ASSESSMENT:  Assessment: Patient progressing toward established goals. Activity Tolerance:  Patient tolerance of  treatment: fair.       Equipment Recommendations:Equipment Needed: No  Discharge Recommendations: Inpatient Rehabilitation  Plan: Current Treatment Recommendations: Strengthening, Balance training, Functional mobility training, Neuromuscular re-education, Transfer training, Endurance training, Equipment evaluation, education, & procurement, Patient/Caregiver education & training, Safety education & training, Therapeutic activities, Home exercise program  General Plan:  (6x O)    Patient Education  Patient Education: Plan of Care, Bed Mobility, Verbal Exercise Instruction    Goals:  Patient Goals : rehab and then home with   Short Term Goals  Time Frame for Short Term Goals: by discharge  Short Term Goal 1: Pt will demo rolling L and R in bed with CGA to improve positioning. Short Term Goal 2: Pt will demo supine to/from sit transfers with mod A x1 with modifications as needed to progress with mobility. Short Term Goal 3: Pt will tolerate 10 min sitting on EOB wtih S to complete functional tasks and progress with mobility. Short Term Goal 4: Pt will tolerate 10-20 reps of ther ex to increase overall mobility. Short Term Goal 5: Pt to transfer sit <--> stand min A for increased functional mobility. Long Term Goals  Time Frame for Long Term Goals : NA due to short ELOS    Following session, patient left in safe position with all fall risk precautions in place. Therapy session performed by Willi BOSCH under supervision of credentialed therapist Jason Robles PTA.

## 2023-02-10 NOTE — PLAN OF CARE
Problem: Discharge Planning  Goal: Discharge to home or other facility with appropriate resources  Outcome: Progressing  Flowsheets (Taken 2/9/2023 2050)  Discharge to home or other facility with appropriate resources:   Identify barriers to discharge with patient and caregiver   Identify discharge learning needs (meds, wound care, etc)     Problem: Safety - Adult  Goal: Free from fall injury  Outcome: Progressing  Flowsheets (Taken 2/10/2023 0004)  Free From Fall Injury: Instruct family/caregiver on patient safety     Problem: Skin/Tissue Integrity  Goal: Absence of new skin breakdown  Description: 1. Monitor for areas of redness and/or skin breakdown  2. Assess vascular access sites hourly  3. Every 4-6 hours minimum:  Change oxygen saturation probe site  4. Every 4-6 hours:  If on nasal continuous positive airway pressure, respiratory therapy assess nares and determine need for appliance change or resting period. Outcome: Progressing  Note: Pt educated on the importance of repositioning in bed. Skin assessed with each assessment every 4 hrs.         Problem: Neurosensory - Adult  Goal: Achieves stable or improved neurological status  Outcome: Progressing  Flowsheets (Taken 2/9/2023 2050)  Achieves stable or improved neurological status: Assess for and report changes in neurological status     Problem: Neurosensory - Adult  Goal: Achieves maximal functionality and self care  Outcome: Progressing  Flowsheets (Taken 2/9/2023 2050)  Achieves maximal functionality and self care: Monitor swallowing and airway patency with patient fatigue and changes in neurological status     Problem: Respiratory - Adult  Goal: Achieves optimal ventilation and oxygenation  Outcome: Progressing  Flowsheets (Taken 2/9/2023 2050)  Achieves optimal ventilation and oxygenation:   Assess for changes in respiratory status   Assess for changes in mentation and behavior   Position to facilitate oxygenation and minimize respiratory effort   Oxygen supplementation based on oxygen saturation or arterial blood gases   Assess and instruct to report shortness of breath or any respiratory difficulty     Problem: Cardiovascular - Adult  Goal: Maintains optimal cardiac output and hemodynamic stability  Outcome: Progressing  Flowsheets (Taken 2/9/2023 2050)  Maintains optimal cardiac output and hemodynamic stability:   Monitor blood pressure and heart rate   Monitor urine output and notify Licensed Independent Practitioner for values outside of normal range     Problem: Pain  Goal: Verbalizes/displays adequate comfort level or baseline comfort level  Outcome: Progressing  Flowsheets (Taken 2/10/2023 0004)  Verbalizes/displays adequate comfort level or baseline comfort level:   Encourage patient to monitor pain and request assistance   Assess pain using appropriate pain scale   Administer analgesics based on type and severity of pain and evaluate response   Implement non-pharmacological measures as appropriate and evaluate response   Consider cultural and social influences on pain and pain management     Problem: Chronic Conditions and Co-morbidities  Goal: Patient's chronic conditions and co-morbidity symptoms are monitored and maintained or improved  Outcome: Progressing  Flowsheets (Taken 2/9/2023 2050)  Care Plan - Patient's Chronic Conditions and Co-Morbidity Symptoms are Monitored and Maintained or Improved:   Monitor and assess patient's chronic conditions and comorbid symptoms for stability, deterioration, or improvement   Collaborate with multidisciplinary team to address chronic and comorbid conditions and prevent exacerbation or deterioration     Problem: Genitourinary - Adult  Goal: Absence of urinary retention  Outcome: Progressing  Flowsheets (Taken 2/9/2023 2050)  Absence of urinary retention:   Assess patients ability to void and empty bladder   Monitor intake/output and perform bladder scan as needed     Problem: Genitourinary - Adult  Goal: Urinary catheter remains patent  Outcome: Progressing  Flowsheets (Taken 2/9/2023 2050)  Urinary catheter remains patent: Assess patency of urinary catheter     Problem: Infection - Adult  Goal: Absence of infection at discharge  Outcome: Progressing  Flowsheets (Taken 2/9/2023 2050)  Absence of infection at discharge:   Assess and monitor for signs and symptoms of infection   Monitor lab/diagnostic results   Monitor all insertion sites i.e., indwelling lines, tubes and drains   Administer medications as ordered   Instruct and encourage patient and family to use good hand hygiene technique     Problem: Infection - Adult  Goal: Absence of infection during hospitalization  Outcome: Progressing  Flowsheets (Taken 2/9/2023 2050)  Absence of infection during hospitalization:   Assess and monitor for signs and symptoms of infection   Monitor lab/diagnostic results   Monitor all insertion sites i.e., indwelling lines, tubes and drains   Administer medications as ordered   Instruct and encourage patient and family to use good hand hygiene technique     Problem: Nutrition Deficit:  Goal: Optimize nutritional status  Outcome: Progressing  Flowsheets (Taken 2/10/2023 0004)  Nutrient intake appropriate for improving, restoring, or maintaining nutritional needs: Monitor oral intake, labs, and treatment plans     Problem: ABCDS Injury Assessment  Goal: Absence of physical injury  Outcome: Progressing  Flowsheets (Taken 2/10/2023 0004)  Absence of Physical Injury: Implement safety measures based on patient assessment     Care plan reviewed with patient. Patient verbalized understanding of the plan of care and contributed to goal setting.

## 2023-02-10 NOTE — PROGRESS NOTES
Called patient  Gt Dec explained that the patient was discharging from [address] [place] [address] and readmitting to [phone].

## 2023-02-10 NOTE — CARE COORDINATION
2/10/23, 10:04 AM EST    Patient goals/plan/ treatment preferences discussed by  and . Patient goals/plan/ treatment preferences reviewed with patient/ family. Patient/ family verbalize understanding of discharge plan and are in agreement with goal/plan/treatment preferences. Understanding was demonstrated using the teach back method. AVS provided by RN at time of discharge, which includes all necessary medical information pertaining to the patients current course of illness, treatment, post-discharge goals of care, and treatment preferences.      Services At/After Discharge: Inpatient rehab       IMM Letter  IMM Letter given to Patient/Family/Significant other/Guardian/POA/by[de-identified] Tres Isaac RN CM  IMM Letter date given[de-identified] 02/06/23  IMM Letter time given[de-identified] 0182

## 2023-02-10 NOTE — PROGRESS NOTES
[address] THERAPY MISSED TREATMENT NOTE  [facility] ICU STEPDOWN TELEMETRY 4K  [id number]      Date: 2/10/2023  Patient Name: Irma Ricks        CSN: [id number]   : 1950  (81 y.o.)  Gender: female   Referring Practitioner: WYATT Lawson CNP  Diagnosis: acute dystolic heart failure         REASON FOR MISSED TREATMENT: Hold Treatment per Nursing RN needing to give patient a suppository, will attempt back as time allows.

## 2023-02-10 NOTE — PLAN OF CARE
Problem: Respiratory - Adult  Goal: Achieves optimal ventilation and oxygenation  2/10/2023 0423 by Shannan Olivarez RCP  Outcome: Progressing   Remains on heated LVN trach mask to provide humidification to tracheostomy. Patient mutually agreed on goals.

## 2023-02-10 NOTE — PROGRESS NOTES
Spoke with Primary RN [address] regarding rehab admission. Discussed need for patient to have a bowel movement today. Planning to admit patient before 2 p.m. today. Voicemail message left with MARIA Contreras. Patient to be discharged/readmitted to [facility].

## 2023-02-10 NOTE — PROGRESS NOTES
Physical Medicine & Rehabilitation Progress Note    Chief Complaint:  Right hip fracture, low back compression fracture    Subjective:    Reyes Ruffing is a 81 y.o. 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 The patient previously was admitted to inpatient rehab service from 2022 to 2022 due to critical care myopathy and debility/physical decondition as result of COVID-19 pneumonia. She was discharged to home with referral for home care service on 2022. The patient developed progressive difficulty breathing in 2022 and was found to have glottic stenosis and eventually received tracheostomy by Dr. Magdaleno Teresa on 2022. The patient's  says the patient also developed progressively worsening lower back pain in summer 2022 and saw orthopedics surgeon at [facility]. The patient has been says the patient was diagnosed of having \"lumbar spine stress fracture\". No surgical intervention was performed. She was treated with brace which she later abandoned due to discomfort associate with brace usage.      The patient was recently hospitalized from 2023 to 2023 initially for abdominal bloating, nausea and leg swelling. She was found to have elevated D-dimer. Subsequent testing revealed right lower lobe pulmonary embolism and right lower extremity DVT. She was at initially treated with IV heparin and later transition to [address] [place] [address]. The patient was brought to ER on 2/1/2022 because of progressively increased leg swelling, and nausea/vomiting associated with decreased oral intake. She was found to have BNP of 256.7 and Bumex was prescribed. She then had a fall accident  when she was going to toilet while she was in ER on 2/1/2022. The fall resulted severe right hip pain. X-ray of right hip and pelvis revealed acute right femoral intertrochanteric fracture with subtrochanteric extension. Orthopedic service was consulted. Xarelto was held in preparation for surgical management. Cardiology was consulted on 2/2/2023 for elevated BNP and Lasix was started. Because of difficulty voiding with urinary retention, urology was also consulted on 2/2/2023. Urology service placed Wen with some difficulty on 2/2/2023. Therefore Wen was kept in place. On 2/3/2023 the patient underwent open treatment of right intertrochanteric fracture with cephalomedullary nail by Dr. Skyler Jha. She is allowed weightbearing as tolerated at the right lower extremity postoperatively. PM&R was consulted on 2/5/2023. During the evaluation the patient's  said that the patient began having progressively worsened low back pain starting in summer 2022 began interfering the patient's daily function. Therefore she is sore orthopedic physician at [facility]. Imaging tests were done and the patient was told that she had \" stress fracture\" in her spine. She was provided with a lumbar spine brace to wear but she was not compliant with spine brace application because of discomfort it produced while she was wearing it.   Because no outside lumbar spine images study report was available for review, x-ray of lumbar spine was ordered and performed on 2/6/2023 and showed moderate vertebral body spondylosis in the lumbar and lower thoracic spine, moderate degenerative facet arthropathy involving at least lower 3 lumbar levels, lumbar spine osteoporosis, mild superior endplate depression fracture at L2, L3 and L4. Lumbar spine MRI was recommended by radiologist.  Therefore primary team ordered lumbar spine MRI which was performed this afternoon revealing acute fracture with a fluid cleft associated with endplate at O7-S2 levels with mild height loss at each vertebral bodies. The patient was found to have severe anemia with Hgb/Hct dropped from 7.3/24 on 2/4/2023 down to 5.1/16.9 on 2/6/2023. Therefore she was given blood transfusion for few units. CT of abdomen and pelvis was ordered to rule out possible intra-abdominal source of blood loss. CT abdomen and pelvis done on 2/8/2023 show only pericolonic inflammation with diverticula related to acute diverticulitis, acute on chronic compression fracture of L2, L3, L4 and L5, mild, and marked osteopenia. The patient's hospital course also complicated by intermittent confusion, and development of gluteus region skin breakdown. The patient was admitted to inpatient rehab service yesterday afternoon. She says she had a good night sleep. She continues having pain at her hips, right groin and right thigh. She rates the pain intensity at 8-9/10 level. She took oxycodone 5 mg twice yesterday after she was admitted. She is starting the intensive inpatient rehabilitation treatment today. Rehabilitation:  PT: Reviewed. Initial evaluation in progress and pending      OT: Reviewed. ADL:  EATING:Setup or clean-up assistance. Nahomy Levine CARE Score: 5. ORAL HYGIENE:Setup or clean-up assistance. Nahomy Levine CARE Score: 5.     TOILETING HYGIENE:Dependent. werner catheter in place. pt martha ble to empty catheter at this time. Pt requires total assist for posterior flaquito care while standing in jose stedy. CARE Score: 1. SHOWERING/BATHING:Substantial/maximal assistance. seated EOB to perform sponge bath. Assist required for back, upper and lower legs, under belly folds, and anterior/posterior flaquito region. pt able to assist with chest, arms, and underarms. CARE Score: 2.     UPPER BODY DRESSING:Substantial/maximal assistance. seated EOB for hospital gown.  is to bring clothing from home in. CARE Score: 2. LOWER BODY DRESSING:Dependent. [facility] CARE Score: 1. FOOTWEAR:Dependent   . CARE Score: 1.     TOILET TRANSFER:  . [facility] CARE Score: 88. Not attempted due to medical condition or safety concerns      BALANCE:  Sitting Balance:  Stand By Assistance, [address], Minimal Assistance. Varies from SBA to Minimal A. Pt tolerated x11 minutes sitting EOB during sponge bath initially requiring SBA and after 5-6 minutes she required occasional minimal assist due to posterior and lateral lean to the left. At 11 minute juan pt allowed rest break by leaning onto pillow with therapist support until nurse entered room to assist with transfer to recliner chair  Standing Balance: Moderate Assistance, X 1. Tolerates x2 minutes In jose stedy and leaning over bar for support while therapist provides posterior flaquito care. BED MOBILITY:  Supine to Sit: Maximum Assistance, X 1, with head of bed raised, with rail, with verbal cues , with increased time for completion       TRANSFERS:  Sit to Stand: Moderate Assistance, X 2, with Hiwot Duet, with increased time for completion, cues for hand placement. To/From Bed and Chair: with Hiwot Duet. FUNCTIONAL MOBILITY:  OTR to assess when appropriate       ST: Reviewed. Initial evaluation in progress and pending      Review of Systems:  Review of Systems   Constitutional:  Positive for fatigue. Negative for chills, diaphoresis and fever.    HENT:  Negative for hearing loss, rhinorrhea, sneezing, sore throat and trouble swallowing. Eyes:  Negative for visual disturbance. Respiratory:  Negative for cough, shortness of breath and wheezing. Cardiovascular:  Negative for chest pain and palpitations. Gastrointestinal:  Negative for abdominal pain, constipation, diarrhea, nausea and vomiting. Genitourinary:  Negative for dysuria. Musculoskeletal:  Positive for arthralgias (Bilateral hip, right groin, and right thigh), back pain (Lower back), gait problem and myalgias (Right thigh). Negative for neck pain. Skin:  Negative for rash. Neurological:  Positive for weakness (Bilateral lower extremities especially on the right side) and numbness (Bilateral feet). Negative for dizziness, tremors, seizures, speech difficulty, light-headedness and headaches. Psychiatric/Behavioral:  Negative for dysphoric mood, hallucinations and sleep disturbance. The patient is not nervous/anxious.          Objective:  BP (!) 114/91   Pulse 81   Temp 97.7 °F (36.5 °C) (Oral)   Resp 18   Ht 5' 2\" (1.575 m)   Wt 227 lb 0.8 oz (103 kg)   LMP  (LMP Unknown)   SpO2 98%   BMI 41.53 kg/m²   Physical Exam   General:  well-developed, well nourished morbid obese  female ; in no acute distress ; appropriate affect & mood; sitting on reclining chair comfortably; receiving humidified oxygen supplement via trach mask; speaking in whispered manner due to presence of tracheostomy  Eyes: pupil equally round ; extra-ocular motion intact bilaterally  Head, Ear, Nose, Mouth & Throat : normocephalic ; no discharge from ears or nose ; no deformity ; no facial swelling ; oral mucosa pink  Neck :  supple ; presence of tracheostomy at anterior neck; no tenderness; mild muscle spasm at cervical paraspinal muscle and upper trapezius muscle  Cardiovascular : regular rate & rhythm ; normal S1 & S2 heart sound ; no murmur ; normal peripheral pulse at bilateral upper & lower extremities  Pulmonary : Breath sounds present at bilateral lung fields; no wheezing ; no rale; no crackle  Gastrointestinal : soft, protruded abdomen without tenderness ; normal bowel sound present    : Wen catheter in place draining yellowish urine  Skin: no skin lesion or rash ; presence of adhesive dressing at right lateral upper and lower thigh; no pitting edema at bilateral upper extremities; 1+ pitting edema at bilateral ankles; presence of PICC line on right arm near elbow area  Musculoskeletal : no limb asymmetry; no limb deformity; tenderness at right lateral and anterior thigh, right lateral hip and right groin area; no tenderness at bilateral upper extremities & the rest of bilateral lower extremities; no palpable mass at limbs ; right hip and right knee joint laxity not assessed; no joints laxity or crepitation at other limb joints; shoulder flexion and abduction passive ROM reaching 160 degrees bilaterally; right hip flexion passive ROM reaching 30 degrees and right knee flexion passive ROM reaching 30 degrees limited by pain at right hip and thigh; left hip flexion passive ROM reaching 90 degrees; ankle dorsiflexion passive ROM reaching 0 degrees bilaterally; otherwise normal functional joints ROM at the rest of bilateral upper & lower extremities  Cerebral :  alert ; awake ; oriented to place, person and time; follow 1-2 steps verbal command  Cerebellum : no dysmetria with bilateral finger-to-nose test but with a slight tremor; unable to perform heel-to-shin test on the right side; dysmetria with left heel-to-shin test  Cranial nerve : CN II to XII function grossly intact  Sensory : intact light touch and pin prick sensation at bilateral upper extremities; reduced light touch and pinprick sensation at distal bilateral lower extremities from upper leg region downward in sock distribution  Motor : normal tone at bilateral upper & left lower extremities ; muscle tone not assessed on right lower extremity due to the pain; 2-/5 muscle strength at right hip flexion, abduction and adduction; 3-/5 muscle strength at the left hip flexion; 3-/5 muscle strength at right knee extension ; 2+/5 muscle strain at the right knee flexion; 4+/5 muscle strength at the left knee flexion ; otherwise 5/5 muscle strength at the rest of bilateral upper and the lower extremities  Reflex : 1+ bilateral brachioradialis reflexes; 0 bilateral biceps and bilateral knees reflexes   Pathological Reflex :  No Roberta's sign ; no ankle clonus  Gait : Not assessed      Diagnostics:   Recent Results (from the past 24 hour(s))   POCT glucose    Collection Time: 02/10/23 11:29 AM   Result Value Ref Range    POC Glucose 103 70 - 108 mg/dl   POCT glucose    Collection Time: 02/10/23  5:20 PM   Result Value Ref Range    POC Glucose 102 70 - 108 mg/dl   POCT glucose    Collection Time: 02/10/23  7:22 PM   Result Value Ref Range    POC Glucose 156 (H) 70 - 108 mg/dl   POCT glucose    Collection Time: 02/11/23  2:03 AM   Result Value Ref Range    POC Glucose 141 (H) 70 - 108 mg/dl   Comprehensive Metabolic Panel w/ Reflex to MG    Collection Time: 02/11/23  6:00 AM   Result Value Ref Range    Glucose 75 70 - 108 mg/dL    Creatinine 0.6 0.4 - 1.2 mg/dL    BUN 13 7 - 22 mg/dL    Sodium 136 135 - 145 meq/L    Potassium reflex Magnesium 3.9 3.5 - 5.2 meq/L    Chloride 100 98 - 111 meq/L    CO2 26 23 - 33 meq/L    Calcium 8.8 8.5 - 10.5 mg/dL    AST 14 5 - 40 U/L    Alkaline Phosphatase 89 38 - 126 U/L    Total Protein 5.8 (L) 6.1 - 8.0 g/dL    Albumin 2.9 (L) 3.5 - 5.1 g/dL    Total Bilirubin 0.6 0.3 - 1.2 mg/dL    ALT <5 (L) 11 - 66 U/L   Prealbumin    Collection Time: 02/11/23  6:00 AM   Result Value Ref Range    Prealbumin 9.9 (L) 20.0 - 40.0 mg/dl   CBC auto differential    Collection Time: 02/11/23  6:00 AM   Result Value Ref Range    WBC 6.8 4.8 - 10.8 thou/mm3    RBC 2.98 (L) 4.20 - 5.40 mill/mm3    Hemoglobin 8.7 (L) 12.0 - 16.0 gm/dl    Hematocrit 28.3 (L) 37.0 - 47.0 %    MCV 95.0 81.0 - 99.0 fL    MCH 29.2 26.0 - 33.0 pg    MCHC 30.7 (L) 32.2 - 35.5 gm/dl    RDW-CV 17.2 (H) 11.5 - 14.5 %    RDW-SD 59.7 (H) 35.0 - 45.0 fL    Platelets 227 130 - 400 thou/mm3    MPV 9.7 9.4 - 12.4 fL    Seg Neutrophils 59.6 %    Lymphocytes 23.8 %    Monocytes 11.4 %    Eosinophils 1.5 %    Basophils 0.6 %    Immature Granulocytes 3.1 %    Segs Absolute 4.1 1.8 - 7.7 thou/mm3    Lymphocytes Absolute 1.6 1.0 - 4.8 thou/mm3    Monocytes Absolute 0.8 0.4 - 1.3 thou/mm3    Eosinophils Absolute 0.1 0.0 - 0.4 thou/mm3    Basophils Absolute 0.0 0.0 - 0.1 thou/mm3    Immature Grans (Abs) 0.21 (H) 0.00 - 0.07 thou/mm3    nRBC 0 /100 wbc   Anion Gap    Collection Time: 02/11/23  6:00 AM   Result Value Ref Range    Anion Gap 10.0 8.0 - 16.0 meq/L   Glomerular Filtration Rate, Estimated    Collection Time: 02/11/23  6:00 AM   Result Value Ref Range    Est, Glom Filt Rate >60 >60 ml/min/1.73m2   POCT glucose    Collection Time: 02/11/23  7:38 AM   Result Value Ref Range    POC Glucose 101 70 - 108 mg/dl         Impression:  Right femoral intertrochanteric fracture with subtrochanteric extension due to fall requiring open reduction and internal fixation with cephalomedullary nail  Glottic stenosis requiring tracheostomy  Persistent chronic low back pain due to lumbar and lower thoracic spine spondylosis with degenerative facet arthropathy at the lower 3 lumbar levels, and acute L2-S1 endplate fractures  Right posterior tibial, left greater saphenous, left peroneal and left anterior tibial veins deep vein thrombosis, and right lower lobe pulmonary embolism  Acute anemia requiring blood transfusion  Lumbar spine osteoporosis  Gluteal region decubitus ulcer  Diabetes mellitus type 2 with poor blood glucose control and complicated by bilateral lower extremities diabetic polyneuropathy  Morbid obesity  Hypertension  Hyperlipidemia  History of lower back pain with history of \"lumbar stress fracture\"  History of COVID infection with pneumonia  History of mild cognitive  impairment  History of coronary artery disease requiring coronary artery stenting  Grade 1 left ventricular diastolic dysfunction with preserved ejection fraction     The patient's vital signs is stable. Laboratory tests still revealing presence of anemia. I will add multivitamin and ferrous sulfate supplement for anemia. She continues having significant pain at her hips and right thigh requiring using narcotic pain medication fairly regularly. We can try adding Voltaren gel application for pain control. The patient is starting the intensive inpatient rehabilitative treatment today. Plan:  Continues intensive PT/OT/SLP/RT inpatient rehabilitation program at least 3 hours per day, 5 days per week in order to improve functional status prior to discharge. Family education and training will be completed. Equipment evaluations and recommendations will be completed as appropriate. Rehabilitation nursing continues to be involved for bowel, bladder, skin, and pain management. Nursing will also provide education and training to patient and family. Prophylaxis:  DVT: Patient on Xarelto, KENA stocking, intermittent pneumatic compression device.   GI: Colace, Enemeez enema, GlycoLax, Senokot, lactulose as needed, milk of magnesium as needed; add Movantik  Pain: Tylenol, oxycodone as needed, lidocaine patch; add Voltaren gel as needed  Continue aspirin for coronary artery disease  Continue Lasix for lower extremities edema  Continue Lantus insulin, Humalog insulin, Humalog insulin coverage for diabetes mellitus  Continue Seroquel for anxiety and insomnia but reduce the frequency from twice daily to daily at bedtime  Continue Xarelto for bilateral DVT and pulmonary embolism  Continue Toprol-XL for hypertension  Continue Crestor for hyperlipidemia  Continue melatonin, trazodone as needed for insomnia  Start multivitamin and ferrous sulfate for anemia  Nutrition:  Consultation to dietician for nutritional counseling and recommendations.   Bladder: Monitoring signs or symptoms of UTI  Bowel: Monitoring signs or symptoms of constipation   and case management consultations for coordination of care and discharge planning      Missed Therapy Time:  None      Anais Cooper MD

## 2023-02-10 NOTE — CARE COORDINATION
2/10/23, 9:22 AM EST    Patient goals/plan/ treatment preferences discussed by  and . Patient goals/plan/ treatment preferences reviewed with patient/ family. Patient/ family verbalize understanding of discharge plan and are in agreement with goal/plan/treatment preferences. Understanding was demonstrated using the teach back method. AVS provided by RN at time of discharge, which includes all necessary medical information pertaining to the patients current course of illness, treatment, post-discharge goals of care, and treatment preferences.      Services At/After Discharge: Inpatient rehab

## 2023-02-11 LAB
ALBUMIN SERPL BCG-MCNC: 2.9 G/DL (ref 3.5–5.1)
ALP SERPL-CCNC: 89 U/L (ref 38–126)
ALT SERPL W/O P-5'-P-CCNC: < 5 U/L (ref 11–66)
ANION GAP SERPL CALC-SCNC: 10 MEQ/L (ref 8–16)
AST SERPL-CCNC: 14 U/L (ref 5–40)
BASOPHILS ABSOLUTE: 0 THOU/MM3 (ref 0–0.1)
BASOPHILS NFR BLD AUTO: 0.6 %
BILIRUB SERPL-MCNC: 0.6 MG/DL (ref 0.3–1.2)
BUN SERPL-MCNC: 13 MG/DL (ref 7–22)
CALCIUM SERPL-MCNC: 8.8 MG/DL (ref 8.5–10.5)
CHLORIDE SERPL-SCNC: 100 MEQ/L (ref 98–111)
CO2 SERPL-SCNC: 26 MEQ/L (ref 23–33)
CREAT SERPL-MCNC: 0.6 MG/DL (ref 0.4–1.2)
DEPRECATED RDW RBC AUTO: 59.7 FL (ref 35–45)
EOSINOPHIL NFR BLD AUTO: 1.5 %
EOSINOPHILS ABSOLUTE: 0.1 THOU/MM3 (ref 0–0.4)
ERYTHROCYTE [DISTWIDTH] IN BLOOD BY AUTOMATED COUNT: 17.2 % (ref 11.5–14.5)
GFR SERPL CREATININE-BSD FRML MDRD: > 60 ML/MIN/1.73M2
GLUCOSE BLD STRIP.AUTO-MCNC: 101 MG/DL (ref 70–108)
GLUCOSE BLD STRIP.AUTO-MCNC: 114 MG/DL (ref 70–108)
GLUCOSE BLD STRIP.AUTO-MCNC: 141 MG/DL (ref 70–108)
GLUCOSE BLD STRIP.AUTO-MCNC: 84 MG/DL (ref 70–108)
GLUCOSE BLD STRIP.AUTO-MCNC: 89 MG/DL (ref 70–108)
GLUCOSE BLD STRIP.AUTO-MCNC: 97 MG/DL (ref 70–108)
GLUCOSE SERPL-MCNC: 75 MG/DL (ref 70–108)
HCT VFR BLD AUTO: 28.3 % (ref 37–47)
HGB BLD-MCNC: 8.7 GM/DL (ref 12–16)
IMM GRANULOCYTES # BLD AUTO: 0.21 THOU/MM3 (ref 0–0.07)
IMM GRANULOCYTES NFR BLD AUTO: 3.1 %
LYMPHOCYTES ABSOLUTE: 1.6 THOU/MM3 (ref 1–4.8)
LYMPHOCYTES NFR BLD AUTO: 23.8 %
MCH RBC QN AUTO: 29.2 PG (ref 26–33)
MCHC RBC AUTO-ENTMCNC: 30.7 GM/DL (ref 32.2–35.5)
MCV RBC AUTO: 95 FL (ref 81–99)
MONOCYTES ABSOLUTE: 0.8 THOU/MM3 (ref 0.4–1.3)
MONOCYTES NFR BLD AUTO: 11.4 %
NEUTROPHILS NFR BLD AUTO: 59.6 %
NRBC BLD AUTO-RTO: 0 /100 WBC
PLATELET # BLD AUTO: 227 THOU/MM3 (ref 130–400)
PMV BLD AUTO: 9.7 FL (ref 9.4–12.4)
POTASSIUM SERPL-SCNC: 3.9 MEQ/L (ref 3.5–5.2)
PREALB SERPL-MCNC: 9.9 MG/DL (ref 20–40)
PROT SERPL-MCNC: 5.8 G/DL (ref 6.1–8)
RBC # BLD AUTO: 2.98 MILL/MM3 (ref 4.2–5.4)
SEGMENTED NEUTROPHILS ABSOLUTE COUNT: 4.1 THOU/MM3 (ref 1.8–7.7)
SODIUM SERPL-SCNC: 136 MEQ/L (ref 135–145)
WBC # BLD AUTO: 6.8 THOU/MM3 (ref 4.8–10.8)

## 2023-02-11 PROCEDURE — 97530 THERAPEUTIC ACTIVITIES: CPT

## 2023-02-11 PROCEDURE — 6370000000 HC RX 637 (ALT 250 FOR IP): Performed by: PHYSICAL MEDICINE & REHABILITATION

## 2023-02-11 PROCEDURE — 82948 REAGENT STRIP/BLOOD GLUCOSE: CPT

## 2023-02-11 PROCEDURE — 2580000003 HC RX 258: Performed by: PHYSICAL MEDICINE & REHABILITATION

## 2023-02-11 PROCEDURE — 85025 COMPLETE CBC W/AUTO DIFF WBC: CPT

## 2023-02-11 PROCEDURE — 1180000000 HC REHAB R&B

## 2023-02-11 PROCEDURE — 97162 PT EVAL MOD COMPLEX 30 MIN: CPT

## 2023-02-11 PROCEDURE — 36415 COLL VENOUS BLD VENIPUNCTURE: CPT

## 2023-02-11 PROCEDURE — 97535 SELF CARE MNGMENT TRAINING: CPT

## 2023-02-11 PROCEDURE — 97542 WHEELCHAIR MNGMENT TRAINING: CPT

## 2023-02-11 PROCEDURE — 84134 ASSAY OF PREALBUMIN: CPT

## 2023-02-11 PROCEDURE — 99232 SBSQ HOSP IP/OBS MODERATE 35: CPT | Performed by: PHYSICAL MEDICINE & REHABILITATION

## 2023-02-11 PROCEDURE — 97110 THERAPEUTIC EXERCISES: CPT

## 2023-02-11 PROCEDURE — 97129 THER IVNTJ 1ST 15 MIN: CPT

## 2023-02-11 PROCEDURE — 92523 SPEECH SOUND LANG COMPREHEN: CPT

## 2023-02-11 PROCEDURE — 80053 COMPREHEN METABOLIC PANEL: CPT

## 2023-02-11 PROCEDURE — 97167 OT EVAL HIGH COMPLEX 60 MIN: CPT

## 2023-02-11 PROCEDURE — 2700000000 HC OXYGEN THERAPY PER DAY

## 2023-02-11 RX ORDER — FERROUS SULFATE 325(65) MG
325 TABLET ORAL
Status: DISCONTINUED | OUTPATIENT
Start: 2023-02-11 | End: 2023-02-15

## 2023-02-11 RX ORDER — MULTIVITAMIN WITH IRON
1 TABLET ORAL DAILY
Status: DISCONTINUED | OUTPATIENT
Start: 2023-02-11 | End: 2023-03-02 | Stop reason: HOSPADM

## 2023-02-11 RX ADMIN — INSULIN LISPRO 5 UNITS: 100 INJECTION, SOLUTION INTRAVENOUS; SUBCUTANEOUS at 18:04

## 2023-02-11 RX ADMIN — TRAZODONE HYDROCHLORIDE 50 MG: 50 TABLET ORAL at 19:45

## 2023-02-11 RX ADMIN — ACETAMINOPHEN 650 MG: 325 TABLET ORAL at 09:28

## 2023-02-11 RX ADMIN — OXYCODONE HYDROCHLORIDE 5 MG: 5 TABLET ORAL at 23:47

## 2023-02-11 RX ADMIN — ASPIRIN 81 MG 81 MG: 81 TABLET ORAL at 09:27

## 2023-02-11 RX ADMIN — INSULIN LISPRO 5 UNITS: 100 INJECTION, SOLUTION INTRAVENOUS; SUBCUTANEOUS at 12:38

## 2023-02-11 RX ADMIN — INSULIN LISPRO 5 UNITS: 100 INJECTION, SOLUTION INTRAVENOUS; SUBCUTANEOUS at 09:35

## 2023-02-11 RX ADMIN — OXYCODONE HYDROCHLORIDE 5 MG: 5 TABLET ORAL at 10:09

## 2023-02-11 RX ADMIN — OXYCODONE HYDROCHLORIDE 5 MG: 5 TABLET ORAL at 15:30

## 2023-02-11 RX ADMIN — ACETAMINOPHEN 650 MG: 325 TABLET ORAL at 15:29

## 2023-02-11 RX ADMIN — FOLIC ACID 1 MG: 1 TABLET ORAL at 09:28

## 2023-02-11 RX ADMIN — ROSUVASTATIN 10 MG: 10 TABLET, FILM COATED ORAL at 09:28

## 2023-02-11 RX ADMIN — SENNOSIDES 17.2 MG: 8.6 TABLET, FILM COATED ORAL at 19:45

## 2023-02-11 RX ADMIN — QUETIAPINE FUMARATE 25 MG: 25 TABLET ORAL at 19:45

## 2023-02-11 RX ADMIN — Medication 1 TABLET: at 12:38

## 2023-02-11 RX ADMIN — OXYCODONE HYDROCHLORIDE 5 MG: 5 TABLET ORAL at 19:44

## 2023-02-11 RX ADMIN — INSULIN GLARGINE 13 UNITS: 100 INJECTION, SOLUTION SUBCUTANEOUS at 09:36

## 2023-02-11 RX ADMIN — FERROUS SULFATE TAB 325 MG (65 MG ELEMENTAL FE) 325 MG: 325 (65 FE) TAB at 12:38

## 2023-02-11 RX ADMIN — DOCUSATE SODIUM 283 MG: 283 LIQUID RECTAL at 09:30

## 2023-02-11 RX ADMIN — RIVAROXABAN 15 MG: 15 TABLET, FILM COATED ORAL at 09:27

## 2023-02-11 RX ADMIN — SODIUM CHLORIDE, PRESERVATIVE FREE 10 ML: 5 INJECTION INTRAVENOUS at 09:35

## 2023-02-11 RX ADMIN — FUROSEMIDE 20 MG: 20 TABLET ORAL at 09:27

## 2023-02-11 RX ADMIN — METOPROLOL SUCCINATE 25 MG: 25 TABLET, FILM COATED, EXTENDED RELEASE ORAL at 09:28

## 2023-02-11 RX ADMIN — DOCUSATE SODIUM 100 MG: 100 CAPSULE, LIQUID FILLED ORAL at 19:45

## 2023-02-11 RX ADMIN — MICONAZOLE NITRATE: 20 POWDER TOPICAL at 22:58

## 2023-02-11 RX ADMIN — SODIUM CHLORIDE, PRESERVATIVE FREE 10 ML: 5 INJECTION INTRAVENOUS at 22:58

## 2023-02-11 RX ADMIN — ACETAMINOPHEN 650 MG: 325 TABLET ORAL at 05:36

## 2023-02-11 RX ADMIN — NALOXEGOL OXALATE 12.5 MG: 12.5 TABLET, FILM COATED ORAL at 05:37

## 2023-02-11 RX ADMIN — ACETAMINOPHEN 650 MG: 325 TABLET ORAL at 23:46

## 2023-02-11 RX ADMIN — MICONAZOLE NITRATE: 20 POWDER TOPICAL at 09:30

## 2023-02-11 RX ADMIN — Medication 6 MG: at 19:45

## 2023-02-11 RX ADMIN — DOCUSATE SODIUM 100 MG: 100 CAPSULE, LIQUID FILLED ORAL at 09:27

## 2023-02-11 RX ADMIN — OXYCODONE HYDROCHLORIDE 5 MG: 5 TABLET ORAL at 05:37

## 2023-02-11 RX ADMIN — RIVAROXABAN 15 MG: 15 TABLET, FILM COATED ORAL at 18:04

## 2023-02-11 RX ADMIN — DICLOFENAC SODIUM 2 G: 10 GEL TOPICAL at 18:04

## 2023-02-11 ASSESSMENT — PAIN DESCRIPTION - DESCRIPTORS
DESCRIPTORS: ACHING
DESCRIPTORS: ACHING;DISCOMFORT;SHARP
DESCRIPTORS: ACHING
DESCRIPTORS: ACHING;SORE
DESCRIPTORS: SHARP;SPASM
DESCRIPTORS: ACHING;SHARP
DESCRIPTORS: ACHING;SORE;SHARP
DESCRIPTORS: SHARP;THROBBING

## 2023-02-11 ASSESSMENT — PAIN SCALES - GENERAL
PAINLEVEL_OUTOF10: 10
PAINLEVEL_OUTOF10: 9
PAINLEVEL_OUTOF10: 8
PAINLEVEL_OUTOF10: 7
PAINLEVEL_OUTOF10: 9
PAINLEVEL_OUTOF10: 10
PAINLEVEL_OUTOF10: 8
PAINLEVEL_OUTOF10: 10

## 2023-02-11 ASSESSMENT — PAIN DESCRIPTION - ORIENTATION
ORIENTATION: RIGHT

## 2023-02-11 ASSESSMENT — PAIN DESCRIPTION - LOCATION
LOCATION: HIP

## 2023-02-11 ASSESSMENT — PAIN - FUNCTIONAL ASSESSMENT
PAIN_FUNCTIONAL_ASSESSMENT: ACTIVITIES ARE NOT PREVENTED
PAIN_FUNCTIONAL_ASSESSMENT: PREVENTS OR INTERFERES SOME ACTIVE ACTIVITIES AND ADLS
PAIN_FUNCTIONAL_ASSESSMENT: ACTIVITIES ARE NOT PREVENTED

## 2023-02-11 NOTE — PLAN OF CARE
Problem: Chronic Conditions and Co-morbidities  Goal: Patient's chronic conditions and co-morbidity symptoms are monitored and maintained or improved  Outcome: Progressing  Flowsheets (Taken 2/11/2023 1123)  Care Plan - Patient's Chronic Conditions and Co-Morbidity Symptoms are Monitored and Maintained or Improved: Monitor and assess patient's chronic conditions and comorbid symptoms for stability, deterioration, or improvement  Pts chronic conditions are stable, pt has trach in place with humidified water. Problem: Skin/Tissue Integrity  Goal: Absence of new skin breakdown  Description: 1. Monitor for areas of redness and/or skin breakdown  2. Assess vascular access sites hourly  3. Every 4-6 hours minimum:  Change oxygen saturation probe site  4. Every 4-6 hours:  If on nasal continuous positive airway pressure, respiratory therapy assess nares and determine need for appliance change or resting period. 2/11/2023 1715 by Shanell Wilson LPN  Outcome: Progressing  Skin assessment every shift. Incisions to R hip, breakdown to buttocks, moisture barrier applied, pillow support, floating side to to side.   Pictured and measured

## 2023-02-11 NOTE — PLAN OF CARE
Problem: Skin/Tissue Integrity  Goal: Absence of new skin breakdown  Description: 1. Monitor for areas of redness and/or skin breakdown  2. Assess vascular access sites hourly  Outcome: Progressing      No new skin issues noted this shift. Problem: Safety - Adult  Goal: Free from fall injury  Outcome: Progressing      No falls sustained at this time. Patient alert to call light and uses appropriately to alert staff to needs. Bed/chair alarms in use. Care plan reviewed with patient. Patient verbalize understanding of the plan of care and contribute to goal setting.

## 2023-02-11 NOTE — PROGRESS NOTES
[place]  Speech - Language - Cognitive Evaluation + Cognitive Treatment    SLP Minutes  Time In: 0900  Time Out: 930  Minutes: 30  Timed Code Treatment Minutes: 8 Minutes   SLCE: 22 minutes  CT: 8 minutes    Date: 2023  Patient Name: Clint Johnson      CSN: [id number]   : 1950  (81 y.o.)  Gender: female   Referring Physician:  Stacy Blackman MD  Diagnosis: Closed intertrochanteric fracture, right,  initial encounter  Precautions: Fall risk  History of Present Illness/Injury: Patient admitted to [facility] with above diagnosis; please see physician H&P for full report. Per chart review, Monica Sanchez is a 81 y.o. 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 The patient previously was admitted to inpatient rehab service from 2022 to 2022 due to critical care myopathy and debility/physical decondition as result of COVID-19 pneumonia. She was discharged to home with referral for home care service on 2022.      The patient developed progressive difficulty breathing in 2022 and was found to have glottic stenosis and eventually received tracheostomy by Dr. Rajiv Hughes on 4/1/2022. The patient's  says the patient also developed progressively worsening lower back pain in summer 2022 and saw orthopedics surgeon at [facility]. The patient has been says the patient was diagnosed of having \"lumbar spine stress fracture\". No surgical intervention was performed. She was treated with brace which she later abandoned due to discomfort associate with brace usage. The patient was recently hospitalized from 1/21/2023 to 1/29/2023 initially for abdominal bloating, nausea and leg swelling. She was found to have elevated D-dimer. Subsequent testing revealed right lower lobe pulmonary embolism and right lower extremity DVT. She was at initially treated with IV heparin and later transition to [address] [place] [address]. The patient was brought to ER on 2/1/2022 because of progressively increased leg swelling, and nausea/vomiting associated with decreased oral intake. She was found to have BNP of 256.7 and Bumex was prescribed. She then had a fall accident  when she was going to toilet while she was in ER on 2/1/2022. The fall resulted severe right hip pain. X-ray of right hip and pelvis revealed acute right femoral intertrochanteric fracture with subtrochanteric extension. Orthopedic service was consulted. Xarelto was held in preparation for surgical management. Cardiology was consulted on 2/2/2023 for elevated BNP and Lasix was started. Because of difficulty voiding with urinary retention, urology was also consulted on 2/2/2023. Urology service placed Wen with some difficulty on 2/2/2023. Therefore Wen was kept in place. On 2/3/2023 the patient underwent open treatment of right intertrochanteric fracture with cephalomedullary nail by Dr. Nava Underwood. She is allowed weightbearing as tolerated at the right lower extremity postoperatively. PM&R was consulted on 2/5/2023.   During the evaluation the patient's  said that the patient began having progressively worsened low back pain starting in summer 2022 began interfering the patient's daily function. Therefore she is sore orthopedic physician at [facility]. Imaging tests were done and the patient was told that she had \" stress fracture\" in her spine. She was provided with a lumbar spine brace to wear but she was not compliant with spine brace application because of discomfort it produced while she was wearing it. Because no outside lumbar spine images study report was available for review, x-ray of lumbar spine was ordered and performed on 2/6/2023 and showed moderate vertebral body spondylosis in the lumbar and lower thoracic spine, moderate degenerative facet arthropathy involving at least lower 3 lumbar levels, lumbar spine osteoporosis, mild superior endplate depression fracture at L2, L3 and L4. Lumbar spine MRI was recommended by radiologist.  Therefore primary team ordered lumbar spine MRI which was performed this afternoon revealing acute fracture with a fluid cleft associated with endplate at V2-V2 levels with mild height loss at each vertebral bodies. The patient was found to have severe anemia with Hgb/Hct dropped from 7.3/24 on 2/4/2023 down to 5.1/16.9 on 2/6/2023. Therefore she was given blood transfusion for few units. CT of abdomen and pelvis was ordered to rule out possible intra-abdominal source of blood loss. CT abdomen and pelvis done on 2/8/2023 show only pericolonic inflammation with diverticula related to acute diverticulitis, acute on chronic compression fracture of L2, L3, L4 and L5, mild, and marked osteopenia. The patient's hospital course also complicated by intermittent confusion, and development of gluteus region skin breakdown. \"    ST consulted to further evaluate cognitive function with implementation of goals/POC as clinically indicated.      Past Medical History:   Diagnosis Date    Arthritis     Coronary artery disease COVID     Diabetic neuropathy ([facility] 2021    Bilateral feet numbness    DVT (deep venous thrombosis) ([facility] 01/21/2023    Right lower extremity    Glottic stenosis     3/2022    Hyperlipidemia     Lower back pain 2022    With diagnosis of having \"stress fracture\" by ortho at [facility]    Primary hypertension     Pulmonary embolism ([facility] 01/21/2023    Right lower lobe    Type 2 diabetes mellitus ([facility] 2018       Pain: No pain reported. Subjective:  Patient seen sitting  upright in recliner  with OTFlorence, upon ST arrival; alert, pleasant, and agreeable for completion of evaluation. Independent placement of PMV for communication this date. No family present. SOCIAL HISTORY:   Living Arrangements: Home with  ; two cats and one dog; three children, three step-children, 26 grandchildren/great-grandchildren  Work History: Retired from [employer]; owns and operates a greenhouse at present  Education Level: Some College  Driving Status: Active   Finance Management: Assistance Required;  shared task  Medication Management: Assistance Required; shared task  ADL's: Independent. Hobbies: [employer]  Vision Status: Impaired; corrective lenses for reading  Hearing: WFL  Type of Home: House  Home Layout: One level  Home Access: Ramped entrance  Home Equipment: Umberto Cowden, [address] [place], [address] / VOICE:  Speech and Voice appear to be grossly intact for basic and complex daily communication; placement  of PMV for functional verbal  communication interactions. LANGUAGE:  Receptive:  Receptive language skills appear to be grossly intact for basic and complex daily communication. Expressive:  Expressive language skills appear to be grossly intact for basic and complex daily communication. COGNITION:  [facility] Cognitive Assessment [facility]) version 7.1 completed. Patient scored 20/30. Normal is greater than or equal to 26/30.   Orientation: 5/6 independent  Immediate Recall: 2/5 independent, 4/5 with repetition  Short-Term Recall: 3/5  independent, 2/5 with category cuing  Divergent Namin members/60 seconds  Problem Solving: Impaired  Reasonin/2  independent  Sequencing: Mildly Impaired  Thought Organization: Impaired  Insight: Fair  Attention: 2/3 basic sustained  attention  Math Computation: 1/5  independent;  discontinued task  Executive Functionin/5  independent    SWALLOWING:  Current Diet: Regular Diet with Thin Liquids  Not Tested; patient and RN with report patient safely consuming current diet level (regular diet with thin  liquids) with no overt difficulty  or concerns at this time. Comprehension: 5 - Patient understands basic needs (hungry/hot/pain)  Expression: 5 - Expresses basic ideas/needs only (hungry/hot/pain)  Social Interaction: 5 - Patient is appropriate with supervision/cues  Problem Solving: 3 - Patient solves simple/routine tasks 50%-74%  Memory: 2 - Patient remembers 25%-49% of the time    RECOMMENDATIONS/ASSESSMENT:  DIAGNOSTIC IMPRESSIONS:  Patient presents with mild cognitive impairment evidenced by aforementioned deficits. Speech and voice appear to be [facility] with no presence of dysarthria, dysphonia, or aphonia; patient intelligible at the conversation level with approximately 100% accuracy given placement of PMV for functional, verbal communication attempts. Expressive and receptive language skills grossly intact with no apparent communicative breakdowns. Patient currently consuming regular diet with thin liquids with no overt difficulty. No barriers to success foreseen. Patient appears motivated with good familial support. Skilled ST services are recommended at this time in order to improve the aforementioned deficits and permit potential return to [facility]. Anticipate needs for supervision with IADLs, driving evaluation, and ongoing skilled ST services via [facility] or OP services at discharge.         Rehabilitation Potential: good  Discharge Recommendations: Continue to Assess Pending Progress    EDUCATION:  Learner: Patient  Education:  Reviewed results and recommendations of this evaluation, Reviewed ST goals and Plan of Care, Reviewed recommendations for follow-up, and Home Safety Education  Evaluation of Education: Verbalizes understanding, Needs further instruction, and Family not present    PLAN:  Skilled SLP intervention on IP Rehab 30 minutes per day 5 days per week. Specific interventions for next session may include: medication management    PATIENT GOAL:    Return to prior level of function. SHORT TERM GOALS:  Short Term Goals  Time Frame for Short Term Goals: 1 week  Goal 1: Patient will complete functional problem solving and reasoning tasks with 70% accuracy and moderate cuing in order to improve completion of ADLs/IADLs at discharge. Goal 2: Patient will complete functional immediate, working, and delayed recall tasks of 4+ units of information, with or without use of trained recall strategies, with 70% accuracy given mod cues to improve retention of pertinent medical information. INTERVENTIONS: ST completed review of compensatory memory strategies using the acronym W.R.A.P. (i.e.,Write it down, Repeat it, Associate it, and Picture it). ST provided functional scenarios to utilize each memory strategy within ADLs/IADLs. WRAP Recall: 4/4 with independent use of written visual aid    Goal 3: Patient will complete functional thought organization and sequencing exercises with 80% accuracy and moderate cuing  in order to improve  completion  of  ADLs/IADLs. Goal 4: Patient will complete sustained, selective, alternating, and divided attention tasks with no more than three  errors/redirections in five minutes/one task in order to allow for safe return to driving at discharge.     LONG TERM GOALS:  Long Term Goals  Time Frame for Long Term Goals: 3 weeks  Goal 1: Patient will improve cognitive  functioning  to a level of modified independent in order to allow for safe return to [facility].       Natalia Roy M.S., [facility]

## 2023-02-11 NOTE — PROGRESS NOTES
[facility]  INPATIENT PHYSICAL THERAPY  DAILY NOTE  [facility] - [id number]    Time In: 1330  Time Out: 1400  Timed Code Treatment Minutes: 30 Minutes  Minutes: 30          Date: 2023  Patient Name: Kaleigh Sierra,  Gender:  female        MRN: [id number]  : 1950  (72 y.o.)     Referring Practitioner: LIYAH Smith MD  Diagnosis: closed intertrochanteric fx, Rt  Additional Pertinent Hx: Kaleigh Sierra is a 72yoF with PMH of recent PE and b/l DVT, macrocytic anemia, CAD, DM2, tracheostomy dependent, obesity, and HTN who presents for leg swelling, nausea, and constipation. She was recently discharged after an 8 day admission for DVTs and PEs. She was placed on Xarelto when discharged and stated that she has been compliant and only missed one dose. She returned due to chronic constipation, feeling ''bloated'', nausea, and LE edema. While being evaluated in the ED the pt had a mechanical fall and developed a R femur fracture. She was found to have supratheraputic INR at 3.83. CT head and C-spine both clear.  Pt is s/p R femur ORIF by Dr Aragon .  Pt found to have acute fractures of L2-S1 likely 2/2 osteoporosis, ok for activity as tolerated per Dr Joseph .     Prior Level of Function:  Lives With: Spouse  Type of Home: House  Home Layout: One level  Home Access: Ramped entrance (or 1 step with no rails.)  Home Equipment: Wheelchair-manual, Walker, rolling, Lift chair (Pt sleeps in lift chair and was using it to assist up to stand PTA)   Bathroom Shower/Tub: Walk-in shower  Bathroom Toilet: Standard  Bathroom Equipment: Built-in shower seat, 3-in-1 commode    ADL Assistance: Needs assistance  Homemaking Assistance: Needs assistance  Ambulation Assistance: Independent  Transfer Assistance: Independent  Active : No  Additional Comments: pt amb short distances in the home with RW,  available  and able to assist as needed. Has a passi valve that she uses  at rest.    Restrictions/Precautions:  Restrictions/Precautions: General Precautions, Fall Risk, Weight Bearing  Right Lower Extremity Weight Bearing: Weight Bearing As Tolerated  Position Activity Restriction  Other position/activity restrictions: tracheostomy/collar with moist air. With venturi mask for out of room use 8 liters portable O2. SUBJECTIVE: Pt resting in bed. Agreeable to bed ex. PAIN: not rated. /10: Ice pack applied to RT hip/thigh at end of session. Vitals: Vitals not assessed per clinical judgement, see nursing flowsheet    OBJECTIVE:  Bed Mobility:  Rolling to Left: Moderate Assistance, X 2, for partial roll to allow for pillow positioning under RT hip at end of session for pressure relief. EXERCISES:  The following exercises were completed to increase range of motion and strength for increased independence with functional mobility:    EXERCISE REPS/SETS   Glut Sets 10/1   Quads Sets 10/1 BLEs with manual cues RLE   Ankle Pumps 10/1 BLEs with manual cues Rt ankle to increase df   Heel Slides 10/1 BLEs with max assist RLE, cues LLE for increased hip/knee flexion   Short Arc Quads 10/1 BLEs with min assist RLE   Mario hip ADD isometrics 10/1   Hip ABDuction 10/1 BLEs with mod assist RLE, manual resistance LLE   Straight Leg Raise 5/1 RLE with max to mod assist   Abdominal isometrics 10/1   BUE diagonals to ea direction 10/1   Mario scapular retraction 10/1      Functional Outcome Measures: Not completed       ASSESSMENT:  Assessment: Patient progressing toward established goals. Activity Tolerance:  Patient tolerance of  treatment: good. Somewhat fatigued. Difficult to stay awake near end of session.      Equipment Recommendations:Equipment Needed: No  Other: Pt has RW, manual w/c and mechanical lift device  Discharge Recommendations: Continue to assess pending progress  Plan: Current Treatment Recommendations: Strengthening, Balance training, Functional mobility training, Transfer training, Endurance training, Equipment evaluation, education, & procurement, Patient/Caregiver education & training, Safety education & training, Therapeutic activities, Home exercise program, Wheelchair mobility training, Gait training, Pain management  General Plan:  (5x/ wk 90 min)    Patient Education  Patient Education: Home Exercise Program    Goals:  Patient Goals : get around o my own without the RW  Short Term Goals  Time Frame for Short Term Goals: 1 wk  Short Term Goal 1: Pt will go from supine <->sit, mod +1 to get in/out of bed. Short Term Goal 2: Pt will get up/down from bed, into JEANNETTE stedy device, +1 mod assist to progress to up to RW  Short Term Goal 3: Pt will  the JEANNETTE stedy device, +1 min assist x5 min, to progress to standing with RW  Short Term Goal 4: Pt will propel w/c >= 50 ft, tile surface, CGA for home mobility  Long Term Goals  Time Frame for Long Term Goals : 3 wks from evaluation. Long Term Goal 1: Pt to go supine <->sit, min +1 to get in/out of bed. Long Term Goal 2: Pt to get up/down from bed or w/c +1 min assist to get up to walk. Long Term Goal 3: Pt to perform stand/pivot transfer with RW, +1 min assist to get in/out of w/c. Long Term Goal 4: Pt to walk with RW >= 10 ft, mn +1 to progress to home mobility  Long Term Goal 5: Pt to propel w/c >= 50 ft, Mod I, for home mobility  Additional Goals?: Yes  Long term goal 6: Pt to get in/out of car, min +1 for transportation needs. Following session, patient left in safe position with all fall risk precautions in place.

## 2023-02-11 NOTE — PROGRESS NOTES
Occupational Therapy  [facility]  INPATIENT OCCUPATIONAL THERAPY  [facility]  EVALUATION    Time:   Time In: 0730  Time Out: 0900  Timed Code Treatment Minutes: 70 Minutes  Minutes: 90          Date: 2023  Patient Name: Silvia Jose,   Gender: female      MRN: [id number]  : 1950  (81 y.o.)  Referring Practitioner: Delfino Hollis MD  Diagnosis: closed intertrochanteric fracture, right  Additional Pertinent Hx: The patient was recently hospitalized from 2023 to 2023 initially for abdominal bloating, nausea and leg swelling. She was found to have elevated D-dimer. Subsequent testing revealed right lower lobe pulmonary embolism and right lower extremity DVT. She was at initially treated with IV heparin and later transition to [address] [place] [address]. The patient was brought to ER on 2022 because of progressively increased leg swelling, and nausea/vomiting associated with decreased oral intake. She was found to have BNP of 256.7 and Bumex was prescribed. She then had a fall accident  when she was going to toilet while she was in ER on 2022. The fall resulted severe right hip pain. X-ray of right hip and pelvis revealed acute right femoral intertrochanteric fracture with subtrochanteric extension. Orthopedic service was consulted. Xarelto was held in preparation for surgical management. Cardiology was consulted on 2023 for elevated BNP and Lasix was started. Because of difficulty voiding with urinary retention, urology was also consulted on 2023. Urology service placed Wen with some difficulty on 2023. Therefore Wen was kept in place. On 2/3/2023 the patient underwent open treatment of right intertrochanteric fracture with cephalomedullary nail by Dr. Alf Ji. She is allowed weightbearing as tolerated at the right lower extremity postoperatively.     Restrictions/Precautions:  Restrictions/Precautions: General Precautions, Fall Risk, Weight Bearing  Right Lower Extremity Weight Bearing: Weight Bearing As Tolerated  Position Activity Restriction  Other position/activity restrictions: tracheostomy/collar with moist air, 1-2 L at baseline    Subjective  Patient assessed for rehabilitation services?: Yes  Subjective: Pt in bed on OT arrival, easily aroused. Pt with baseline trach mask in place. Pt able to place speaking valve independently to answer occupational profile questions. Removes with activity. Pain: 9/10: Right hip    Vitals: Blood Pressure: pre-activity: 112/68 post activity: 114/9  Oxygen: % on trach collar with activity and 99% at rest with speaking valve  Heart Rate: 84 bpm    Social/Functional History:  Lives With: Spouse  Type of Home: House  Home Layout: One level  Home Access: Ramped entrance  Home Equipment: Maricarmen Negus, rolling   Bathroom Shower/Tub: Walk-in shower  Bathroom Toilet: Standard  Bathroom Equipment: Built-in shower seat, 3-in-1 commode       ADL Assistance: Needs assistance  Homemaking Assistance: Needs assistance  Ambulation Assistance: Independent  Transfer Assistance: Independent    Active : No  Patient's  Info: Spouse provides transportation services     Additional Comments: pt amb short distances in the home with RW,  available 24/7 and able to assist as needed. Has a passi valve that she uses at rest. On trach mask with activity with 1-2L O2 and moisture. Has portable concentrator for community. VISION:WFL    HEARING:  WFL    COGNITION: Decreased Problem Solving    RANGE OF MOTION:  Right Upper Extremity: WNL  Left Upper Extremity:  WNL    STRENGTH:  Right Upper Extremity: WFL-grossly 4/5  Left Upper Extremity:  WFL- grossly 4/5    SENSATION:   Peripheral neuropathy in feet    ADL:     EATING:Setup or clean-up assistance. Arlean Sabine CARE Score: 5. ORAL HYGIENE:Setup or clean-up assistance. Arlean Sabine CARE Score: 5.     TOILETING HYGIENE:Dependent.   werner catheter in place. pt martha ble to empty catheter at this time. Pt requires total assist for posterior flaquito care while standing in [facility]. CARE Score: 1. SHOWERING/BATHING:Substantial/maximal assistance. seated EOB to perform sponge bath. Assist required for back, upper and lower legs, under belly folds, and anterior/posterior flaquito region. pt able to assist with chest, arms, and underarms. CARE Score: 2.     UPPER BODY DRESSING:Substantial/maximal assistance. seated EOB for hospital gown.  is to bring clothing from home in. CARE Score: 2. LOWER BODY DRESSING:Dependent. [facility] CARE Score: 1. FOOTWEAR:Dependent   . CARE Score: 1.     TOILET TRANSFER:  . [facility] CARE Score: 88. Not attempted due to medical condition or safety concerns        BALANCE:  Sitting Balance:  Stand By Assistance, [address], Minimal Assistance. Varies from SBA to Minimal A. Pt tolerated x11 minutes sitting EOB during sponge bath initially requiring SBA and after 5-6 minutes she required occasional minimal assist due to posterior and lateral lean to the left. At 11 minute juan pt allowed rest break by leaning onto pillow with therapist support until nurse entered room to assist with transfer to recliner chair  Standing Balance: Moderate Assistance, X 1. Toleates x2 minutes In [facility] and leaning over bar for support while therapist provides posterior flaquito care. BED MOBILITY:  Supine to Sit: Maximum Assistance, X 1, with head of bed raised, with rail, with verbal cues , with increased time for completion      TRANSFERS:  Sit to Stand: Moderate Assistance, X 2, with Ana Rafa, with increased time for completion, cues for hand placement. To/From Bed and Chair: with Aan Lites. FUNCTIONAL MOBILITY:  OTR to assess when appropriate       Activity Tolerance:  Patient tolerance of  treatment: fair. Assessment:  Rita Pierre is a 81 y. o.female that presents with below new performance deficits secondary to s/p fall with femur IM nailing on 2/3/23. Pt is requiring increased assistance for ADLs, functional mobility, ADL transfers compared to baseline level of function. At evaluation pt is requiring maximum assist for dressing and bathing when sitting EOB to perform ADL routine. Pt has a werner and is dependent for BM clean up from bed. Pt is typically independent with dressing, bathing and toileting with SBA from  for safety measures. She is typically able to ambulate and transfer at home with Rw but is requiring use of jose stedy for transfers at time of evaluation and moderate Ax1 for sit to tstand with elevated bed to jose stedy. Without OT pt is at risk for falls, further decline in functional abilities, increased caregiver burden, increased risk for medical complication as a result of reduce mobility and inability to return to prior level of living. Performance deficits / Impairments: Decreased functional mobility , Decreased strength, Decreased endurance, Decreased ADL status, Decreased safe awareness, Decreased balance, Decreased cognition  Prognosis: Fair  REQUIRES OT FOLLOW-UP: Yes  Decision Making: High Complexity    Treatment Initiated: Treatment and education initiated within context of evaluation. Evaluation time included review of current medical information, gathering information related to past medical, social and functional history, completion of standardized testing, formal and informal observation of tasks, assessment of data and development of plan of care and goals. Treatment time included skilled education and facilitation of tasks to increase safety and independence with ADL's for improved functional independence and quality of life.     Discharge Recommendations:  Continue to assess pending progress    Patient Education:     Patient Education  Education Given To: Patient  Education Provided: Role of Therapy, Plan of Care, ADL Adaptive Strategies, Transfer Training  Education Method: Demonstration, Verbal  Barriers to Learning: None  Education Outcome: Continued education needed    Equipment Recommendations: Other: Monitor pending progress    Plan:  Times Per Week: 5x per week for 90 minutes  Times Per Day: Once a day  Current Treatment Recommendations: Balance training, Functional mobility training, Endurance training, Self-Care / ADL, Safety education & training, Patient/Caregiver education & training, Strengthening. See long-term goal time frame for expected duration of plan of care. If no long-term goals established, a short length of stay is anticipated. Goals:  Patient goals : \"I want to be able to walk and care for self\"  Short Term Goals  Time Frame for Short Term Goals: until discharge  Short Term Goal 1: Pt will tolerate 12-15 min EOB ADL task with S to improve trunk control and activity tolerance required for EOB ADLs. Short Term Goal 2: Pt will complete sit-stand t/fs with mod A x 1 with minimal cues of technique to progress towards BSC t/fs  Short Term Goal 3: Pt will tolerate standing 1 min with min A for increased ease of clothing management nad LB bathing routine  Short Term Goal 4: Pt will tolerate further assessment of functional t/fs by OTR when appropriate  Long Term Goals  Time Frame for Long Term Goals : until discharge  Long Term Goal 1: Pt will complete sit-stand t/fs with SBA x 1 with minimal cues of technique to progress towards BSC t/fs  Long Term Goal 2: Pt will perform UB/LB dressing and bathing with Min A and minimal cues for ECT to improve functional independence. Following session, patient left in safe position with all fall risk precautions in place.

## 2023-02-11 NOTE — PROGRESS NOTES
[address] [address]  INPATIENT PHYSICAL THERAPY  EVALUATION  [facility] - [id number]    Time In: 1000  Time Out: 1100  Timed Code Treatment Minutes: 60 Minutes  Minutes: 60          Date: 2023  Patient Name: Uyen Myers,  Gender:  female        MRN: [id number]  : 1950  (81 y.o.)      Referring Practitioner: Ginger Dao MD  Diagnosis: closed intertrochanteric fx, Rt  Additional Pertinent Hx: Jamil Zheng is a 73yoF with PMH of recent PE and b/l DVT, macrocytic anemia, CAD, DM2, tracheostomy dependent, obesity, and HTN who presents for leg swelling, nausea, and constipation. She was recently discharged after an 8 day admission for DVTs and PEs. She was placed on Xarelto when discharged and stated that she has been compliant and only missed one dose. She returned due to chronic constipation, feeling ''bloated'', nausea, and LE edema. While being evaluated in the ED the pt had a mechanical fall and developed a R femur fracture. She was found to have supratheraputic INR at 3.83. CT head and C-spine both clear. Pt is s/p R femur ORIF by Dr Ana Evans . Pt found to have acute fractures of L2-S1 likely 2/2 osteoporosis, ok for activity as tolerated per Dr Irene Weinberg . Restrictions/Precautions:  Restrictions/Precautions: General Precautions, Fall Risk, Weight Bearing  Right Lower Extremity Weight Bearing: Weight Bearing As Tolerated  Position Activity Restriction  Other position/activity restrictions: tracheostomy/collar with moist air. With venturi mask for out of room use 8 liters portable O2. Subjective:  Chart Reviewed: Yes  Patient assessed for rehabilitation services?: Yes  Subjective: Pt seated in recliner. Stated she was getting tired. Cooperative. Respiratory consulted to acquire a venturi mask for use with trac when out of the room.     General:     Vision: Within Functional Limits  Hearing: Within functional limits       Pain: 10/10: RT thigh/hip with movement. Nsg notified and pt received pain meds during session. Ice pack applied to RT thigh/hip at end of session. Vitals: Vitals not assessed per clinical judgement, see nursing flowsheet    Social/Functional History:    Lives With: Spouse  Type of Home: House  Home Layout: One level  Home Access: Ramped entrance (or 1 step with no rails.)  Home Equipment: Jatinder Rachel, rolling (JEANNETTE stedy type lift device)     Bathroom Shower/Tub: Walk-in shower  Bathroom Toilet: Standard  Bathroom Equipment: Built-in shower seat, 3-in-1 commode       ADL Assistance: Needs assistance  Bath: Stand by assistance  Dressing: Stand by assistance  Toileting: Independent  Homemaking Assistance: Needs assistance  Ambulation Assistance: Independent  Transfer Assistance: Independent    Active : No     Additional Comments: pt amb short distances in the home with RW,  available 24/7 and able to assist as needed. Has a passi valve that she uses at rest.    OBJECTIVE:  Range of Motion:  Right Lower Extremity: [employer] SYSTEM [place] though guarded at hip with assisted movement. Able to attain functional range with transfers and standing. Left Lower Extremity: [employer] [place]    Strength:  Right Lower Extremity: ankle WFL,  knee required min assist for TKE in sitting,  all hip action required min to mod assist with functional mobility. Did tolerate wt in standing on RLE  Left Lower Extremity: WFL    Balance:  Static Sitting Balance:  Stand By Assistance  Static Standing Balance: Contact Guard Assistance, within the [address] [place]. Pt tolerated approx 10 sec of upright standing with wt bearing on forearms of front bar of device x2 trials. Bed Mobility:  Sit to Supine: Maximum Assistance, X 2   Rolling deferred due to pt c/o pain and unable to attempt with the pain. Transfers:  Sit to Stand:  Moderate Assistance, X 2  Stand to Sit:Moderate Assistance, X 2  To/From Bed and Chair: Dependent, with Lise Leroy    Ambulation:  Not Tested  Wheelchair Mobility:  Minimal Assistance, with turning to the LT. SBA with straight path  Extremities Used: Bilateral Upper Extremities  Type of Chair:Manual  Surface: Level Tile  Distance: 30 ft  Quality: Slow Velocity, Short Strokes, and Decreased Fluidity   Exercise:    Exercises were completed for increased independence with functional mobility. Glute sets, quad sets with manual cues RLE, ankle pumps BLEs x10 reps. Assisted knee ext in sitting x4 reps RLE    Functional Outcome Measures: Not completed       ASSESSMENT:  Activity Tolerance:  Patient tolerance of  treatment: fair. Required frequent rest breaks. Limited by pain RLE      Treatment Initiated: Treatment and education initiated within context of evaluation. Evaluation time included review of current medical information, gathering information related to past medical, social and functional history, completion of standardized testing, formal and informal observation of tasks, assessment of data and development of plan of care and goals. Treatment time included skilled education and facilitation of tasks to increase safety and independence with functional mobility for improved independence and quality of life. Assessment: Body Structures, Functions, Activity Limitations Requiring Skilled Therapeutic Intervention: Decreased functional mobility , Decreased endurance, Increased pain, Decreased posture, Decreased balance, Decreased tolerance to work activity, Decreased strength  Assessment: Pt presents with a decline in baseline by way of bed mobility, transfers and currently unable to walk due to recent fall resulting in Rt intertrochanteric fx and acute fs's L2-S1. These have caused increased pain, decreased strength, endurance and balance resulting in need for max assist with bed mobility, +2 assist with transfers into a transfer device for bed <->chair transfers.   Pt will benefit from skilled PT to advance in these areas for improved functional mobility and gait progression. Therapy Prognosis: Good         Discharge Recommendations:  Discharge Recommendations: Continue to assess pending progress, Patient would benefit from continued therapy after discharge    Patient Education:  Education  Education Given To: Patient  Education Provided: Role of Therapy, Plan of Care, Transfer Training, Mobility Training  Education Method: Demonstration, Verbal  Barriers to Learning: None  Education Outcome: Verbalized understanding   . Equipment Recommendations:  Equipment Needed: No  Other: Pt has RW, manual w/c and mechanical lift device    Plan:  Current Treatment Recommendations: Strengthening, Balance training, Functional mobility training, Transfer training, Endurance training, Equipment evaluation, education, & procurement, Patient/Caregiver education & training, Safety education & training, Therapeutic activities, Home exercise program, Wheelchair mobility training, Gait training, Pain management  General Plan:  (5x/ wk 90 min)    Goals:  Patient Goals : get around o my own without the RW  Short Term Goals  Time Frame for Short Term Goals: 1 wk  Short Term Goal 1: Pt will go from supine <->sit, mod +1 to get in/out of bed. Short Term Goal 2: Pt will get up/down from bed, into JEANNETTE stedy device, +1 mod assist to progress to up to RW  Short Term Goal 3: Pt will  the JEANNETTE stedy device, +1 min assist x5 min, to progress to standing with RW  Short Term Goal 4: Pt will propel w/c >= 50 ft, tile surface, CGA for home mobility  Long Term Goals  Time Frame for Long Term Goals : 3 wks from evaluation. Long Term Goal 1: Pt to go supine <->sit, min +1 to get in/out of bed. Long Term Goal 2: Pt to get up/down from bed or w/c +1 min assist to get up to walk. Long Term Goal 3: Pt to perform stand/pivot transfer with RW, +1 min assist to get in/out of w/c.   Long Term Goal 4: Pt to walk with RW >= 10 ft, mn +1 to progress to home mobility  Long Term Goal 5: Pt to propel w/c >= 50 ft, Mod I, for home mobility  Additional Goals?: Yes  Long term goal 6: Pt to get in/out of car, min +1 for transportation needs. Following session, patient left in safe position with all fall risk precautions in place.

## 2023-02-11 NOTE — DISCHARGE SUMMARY
Hospital Medicine Discharge Summary      Patient Identification:   Nimo Maloney   : 1950  MRN: [id number]   Account: [de-identified]      Patient's PCP: Charisma Wan MD    Admit Date: 2023     Discharge Date: 2/10/2023      Admitting Physician: No admitting provider for patient encounter. Discharge Physician: Fani Anderson MD     Discharge Diagnoses:    Acute right intertrochanteric femur fracture  Chronic PE  Chronic normo/macrocytic anemia:   CAD  DM2  Chronic Respiratory failure  Primary HTN  Chronic G1DD  Obesity   Multiple lumbar Compression fractures        The patient was seen and examined on day of discharge and this discharge summary is in conjunction with any daily progress note from day of discharge. Hospital Course:   Nadeen Richardson is a 73yoF with PMH of recent PE and b/l DVT, macrocytic anemia, CAD, DM2, tracheostomy dependent, obesity, and HTN who presents for leg swelling, nausea, and constipation. She was recently discharged after an 8 day admission for DVTs and PEs. She was placed on Xarelto when discharged and stated that she has been compliant and only missed one dose. She returned due to chronic constipation, feeling ''bloated'', nausea, and LE edema. While being evaluated in the ED the pt had a mechanical fall and developed a R femur fracture. She was found to have supratheraputic INR at 3.83. CT head and C-spine both clear. 23  -Her pain is being managed with morphine and her Xarelto is being held. She was also given Vitamin K to reduce her INR. -Urology was consulted to place a werner due to concerns of urinary retention. Resource RN, and multiple other nurses, was unable to successfully place werner. Will f/u with Urology. 2/3/23  -Pt had improvement in INR but it was still elevated over 2. FFP was ordered and consent was obtained after discussing risks and benefits with patient and her .      -Pt will go to surgery today after INR improves. 2/4/23  -Pt s/p cephalomedullary nail on R IT femur fracture by Dr. Chowdary Most in Hgb, will continue to monitor and transfuse as needed. -Urology recommends to continue werner until pt is mobile, having good BMs and has improvement in swelling with completed diuresis.    -Pt given 1g Calcium gluc due to hypocalcemia and mild increase in K+. Suspecting K+ is secondary to hemolysis. 2/5/23    Pt tolerating diet , and offers no active new complaints, spouse at   Bed side, periods of confusion . 2/6/23  Pt's hemoglobin was found to be 5.1. Pt had no new complaints during assessment. While MM were pale pt had no complaints of chest pain or sob. No signs of hemorrhage were found on exam. Work-up for concerns of hemolytic cause ordered. Transfusion indicated and another H&H was ordered to verify accuracy of results. Will continue Xarelto due to presence of PEs and DVTs. Otherwise, pt's pain from R hip surgery was controlled. -PMR consulted who ordered lumbar XR. Pt did admit to Hx off spinal injury therefore suspect injuries are not acute. Will continue to follow with PMR and MRI results. 2/7/23  - Pt has no new complaints today and is CAOx4 eating breakfast.  -IR consulted for possible vertebroplasty, due to acute lumbar fractures.   -Calcium at 8.2, ordered Ionized Ca+ levels  -Hgb 6.4 so 2 more units of blood ordered. Pt agreed to the treatment. -CT abd ordered for investigation into patient's anemia.  -Family requesting transfer to [facility], Transfer initiated. -Discussed with family possibility of out of pocket cost for transfer transport. 02/09/2023  Patient and family agreed to cancel [facility] transfer. They requested for inpatient rehabilitation. 02/10/2023  Patient was medically stable and cleared by all consulting specialists for transfer to inpatient rehab.       Exam:     Vitals:  Vitals:    02/10/23 0423 02/10/23 0835 02/10/23 1020 02/10/23 1158   BP:  131/60  130/67   Pulse: 92 98  94 Resp: 16 18  20   Temp:  98.3 °F (36.8 °C)  98.1 °F (36.7 °C)   TempSrc:  Oral  Oral   SpO2: 93% 93% 97% 97%   Weight:       Height:         Weight: Weight: 232 lb (105.2 kg)     24 hour intake/output:  Intake/Output Summary (Last 24 hours) at 2/10/2023 1924  Last data filed at 2/10/2023 1443  Gross per 24 hour   Intake 440 ml   Output 750 ml   Net -310 ml         General appearance:  No apparent distress, appears stated age and cooperative. HEENT:  Normal cephalic, atraumatic without obvious deformity. Pupils equal, round, and reactive to light. Extra ocular muscles intact. Conjunctivae/corneas clear. Neck: Supple, with full range of motion. No jugular venous distention. Trachea midline. Respiratory:  Normal respiratory effort. Clear to auscultation, bilaterally without Rales/Wheezes/Rhonchi. Cardiovascular:  Regular rate and rhythm with normal S1/S2 without murmurs, rubs or gallops. Abdomen: Soft, non-tender, non-distended with normal bowel sounds. Musculoskeletal:  No clubbing, cyanosis or edema bilaterally. Right hip dressing C/D/I. Skin: Skin color, texture, turgor normal.  No rashes or lesions. Neurologic:  Neurovascularly intact without any focal sensory/motor deficits. Cranial nerves: II-XII intact, grossly non-focal.  Psychiatric:  Alert and oriented, thought content appropriate, normal insight  Capillary Refill: Brisk,< 3 seconds   Peripheral Pulses: +2 palpable, equal bilaterally       Labs:  For convenience and continuity at follow-up the following most recent labs are provided:      CBC:    Lab Results   Component Value Date/Time    WBC 8.5 02/10/2023 04:47 AM    HGB 8.6 02/10/2023 04:47 AM    HCT 27.2 02/10/2023 04:47 AM     02/10/2023 04:47 AM       Renal:    Lab Results   Component Value Date/Time     02/10/2023 04:47 AM    K 3.7 02/10/2023 04:47 AM    K 4.2 02/07/2023 06:02 AM     02/10/2023 04:47 AM    CO2 28 02/10/2023 04:47 AM    BUN 12 02/10/2023 04:47 AM CREATININE 0.5 02/10/2023 04:47 AM    CALCIUM 8.0 02/10/2023 04:47 AM    PHOS 3.1 04/06/2022 05:00 AM         Significant Diagnostic Studies    Radiology:   CT ABDOMEN PELVIS WO CONTRAST Additional Contrast? None   Final Result   1. Pericolonic inflammatory changes around the sigmoid colon and descending colon with diverticula relate to acute diverticulitis. 2. A sinus tract is seen connecting the right femoral greater trochanter to the skin. This is only partially imaged on this study but direct visualization is recommended. Further imaging may be required. 3. A 7 mm right lung base pulmonary nodule. 4. Acute on chronic compression fractures of L2, L3, L4 and L5. Marked osteopenia. 5. Other findings as described above. **This report has been created using voice recognition software. It may contain minor errors which are inherent in voice recognition technology. **      Final report electronically signed by Dr Temo Sierra on 2/8/2023 12:31 PM      MRI LUMBAR SPINE [address]   Final Result       1. Acute fractures with fluid clefts associated with the endplates at the E3-C8 levels. The height loss of each vertebral body is mild. These are most likely pathologic secondary to underlying osteoporosis. 2. 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 **This report has been created using voice recognition software. It may contain minor errors which are inherent in voice recognition technology. **      Final report electronically signed by Dr. Christopher Miranda on 2/6/2023 3:20 PM      XR LUMBAR SPINE (2-3 VIEWS)   Final Result   1. Slight thoracolumbar levoscoliosis. Cannot exclude muscle spasm right side. 2. Moderate vertebral body spondylosis scattered in the lumbar and lower thoracic spine. Moderate degenerative facet arthropathy involving at least the lower 3 lumbar levels.    3. Diffuse demineralization of the bones, consistent with osteoporosis. Mild superior endplate depression fractures L2, L3, and L4 of questionable acuity but none of these were present on the prior CT abdomen dated 1/10/2022. These were all present, in    retrospect, on and abdominal film dated 1/25/2023. 4. MRI lumbar spine would be helpful for further evaluation. Patient may be a good candidate for vertebroplasty. If so, this can be arranged through the interventional scheduling desk of [facility] radiology department (extension [phone]). **This report has been created using voice recognition software. It may contain minor errors which are inherent in voice recognition technology. **      Final report electronically signed by Dr. Kelly Cochran on 2/6/2023 8:23 AM      XR FEMUR RIGHT (MIN 2 VIEWS)   Final Result   Intraoperative imaging demonstrating open reduction internal fixation right hip            **This report has been created using voice recognition software. It may contain minor errors which are inherent in voice recognition technology. **      Final report electronically signed by Dr. Ana Phelps on 2/3/2023 3:02 PM      FLUORO FOR SURGICAL PROCEDURES   Final Result      XR CHEST PORTABLE   Final Result   1. Marked cardiomegaly. Right arm PICC line, catheter tip at cavoatrial projection. . Tracheostomy tube in place. 2. No acute findings. No infiltrates or effusions are seen. **This report has been created using voice recognition software. It may contain minor errors which are inherent in voice recognition technology. **      Final report electronically signed by Dr. Kelly Cochran on 2/2/2023 2:20 PM      XR ABDOMEN (KUB) (SINGLE AP VIEW)   Final Result   Impression:    Resolved constipation. Right femoral intertrochanteric fracture. This document has been electronically signed by: Marcellus Mosher on    02/02/2023 05:35 AM      CT CERVICAL SPINE WO CONTRAST   Final Result    No fracture.                **This report has been created using voice recognition software. It may contain minor errors which are inherent in voice recognition technology. **      Final report electronically signed by Dr. Lilli Hadley on 2/2/2023 7:38 AM      CT HEAD WO CONTRAST   Final Result    No evidence of an acute process. **This report has been created using voice recognition software. It may contain minor errors which are inherent in voice recognition technology. **      Final report electronically signed by Dr. Lilli Hadley on 2/2/2023 7:25 AM      XR HIP 2-3 VW W PELVIS RIGHT   Final Result   Impression:   Acute right femoral intertrochanteric fracture, with subtrochanteric    extension. This document has been electronically signed by: Diony Barrera MD on    02/02/2023 05:01 AM      XR CHEST 1 VIEW   Final Result   Impression:   No acute cardiopulmonary disease. Cardiomegaly. This document has been electronically signed by: Diony Barrera MD on    02/02/2023 05:02 AM      CTA CHEST W WO CONTRAST   Final Result   Impression:   1. Stable bilateral nonocclusive pulmonary emboli. Negative for CT    evidence of right heart strain or thrombus propagation. No new pulmonary    emboli since the prior study 1/21/23. 2. New trace bilateral pleural effusions. 3. Nonemergent/incidental findings as above. This document has been electronically signed by: Diony Barrera MD on    02/02/2023 02:13 AM      All CTs at this facility use dose modulation techniques and iterative    reconstructions, and/or weight-based dosing   when appropriate to reduce radiation to a low as reasonably achievable. 3D Post-processing was performed on this study.              Consults:     STR ED TO IP CONSULT  STR ED TO IP CONSULT  IP CONSULT TO HEART FAILURE NURSE/COORDINATOR  IP CONSULT TO DIETITIAN  IP CONSULT TO CARDIOLOGY  IP CONSULT TO SOCIAL WORK  IP CONSULT TO UROLOGY  IP CONSULT TO PHYSICAL MEDICINE REHAB  IP CONSULT TO REHAB/TCU ADMISSION COORDINATOR  IP CONSULT TO SOCIAL WORK    Disposition: Rehab  Condition at Discharge: Stable    Code Status:  Full Code     Patient Instructions:    Discharge lab work: N/A  Activity: activity as tolerated  Diet: No diet orders on file      Follow-up visits:   Ysabel Cazares, APRN - CNP  [address]    Follow up  Call as needed    [address] [place] [address]  [phone]             Discharge Medications:        Medication List        START taking these medications      * HYDROcodone-acetaminophen 5-325 MG per tablet  Commonly known as: Norco  Take 1 tablet by mouth every 4-6 hours as needed for Pain for up to 7 days. Max Daily Amount: 6 tablets           * This list has 1 medication(s) that are the same as other medications prescribed for you. Read the directions carefully, and ask your doctor or other care provider to review them with you. CONTINUE taking these medications      Insulin Pen Needle 31G X 6 MM Misc  1 each by Does not apply route daily     rivaroxaban 15 & 20 MG Starter Pack  Take as directed on package. ASK your doctor about these medications      * albuterol 0.63 MG/3ML nebulizer solution  Commonly known as: ACCUNEB     * albuterol sulfate  (90 Base) MCG/ACT inhaler  Commonly known as: PROVENTIL;VENTOLIN;PROAIR  Inhale 2 puffs into the lungs every 6 hours as needed for Wheezing     amoxicillin-clavulanate 875-125 MG per tablet  Commonly known as: AUGMENTIN  Take 1 tablet by mouth 2 times daily for 10 days  Ask about: Should I take this medication?     aspirin 81 MG chewable tablet     Cepacol 15-2.3 MG Lozg  Generic drug: Benzocaine-Menthol  Take 1 lozenge by mouth every 3 hours as needed (sore throat, pain)     glucagon 1 MG injection  Inject 1 mg into the muscle See Admin Instructions Follow package directions for low blood sugar.      guaiFENesin 400 MG tablet     * HYDROcodone-acetaminophen 5-325 MG per tablet  Commonly known as: NORCO  Take 1 tablet by mouth every 6 hours as needed for Pain for up to 3 days. Max Daily Amount: 4 tablets  Ask about: Should I take this medication?     insulin lispro (1 Unit Dial) 100 UNIT/ML Sopn  Commonly known as: HumaLOG KwikPen  Inject 20-25 Units into the skin in the morning and 20-25 Units at noon and 20-25 Units in the evening. Inject before meals. insulin  UNIT/ML injection vial  Commonly known as: HumuLIN N;NovoLIN N     Lantus SoloStar 100 UNIT/ML injection pen  Generic drug: insulin glargine  Inject 22 Units into the skin nightly     lidocaine 4 % external solution  Commonly known as: XYLOCAINE     metoprolol succinate 25 MG extended release tablet  Commonly known as: TOPROL XL  Take 1 tablet by mouth daily     MUCINEX ALLERGY PO     nitroGLYCERIN 0.4 MG SL tablet  Commonly known as: NITROSTAT     NONFORMULARY     NovoLOG FlexPen 100 UNIT/ML injection pen  Generic drug: insulin aspart  Inject 20-25 Units into the skin in the morning and 20-25 Units at noon and 20-25 Units in the evening. Inject before meals. nystatin 222989 UNIT/GM powder  Commonly known as: MYCOSTATIN     ondansetron 4 MG disintegrating tablet  Commonly known as: ZOFRAN-ODT  Take 2 tablets by mouth every 8 hours as needed for Nausea or Vomiting  Ask about: Should I take this medication? OXYGEN  Inhale 2 L/min into the lungs continuous prn (during activities such as walking)     pantoprazole 40 MG tablet  Commonly known as: PROTONIX     rosuvastatin 10 MG tablet  Commonly known as: CRESTOR     senna 8.6 MG tablet  Commonly known as: SENOKOT  Take 1 tablet by mouth nightly     sodium chloride flush 0.9 % injection  Infuse 5-40 mLs intravenously 2 times daily Flush 10 mL twice daily.      tiZANidine 4 MG tablet  Commonly known as: ZANAFLEX     traMADol 50 MG tablet  Commonly known as: ULTRAM     * VITAMIN D3 PO     * D3-50 01414 UNIT Caps  Generic drug: vitamin D           * This list has 5 medication(s) that are the same as other medications prescribed for you. Read the directions carefully, and ask your doctor or other care provider to review them with you. Where to Get Your Medications        You can get these medications from any pharmacy    Bring a paper prescription for each of these medications  HYDROcodone-acetaminophen 5-325 MG per tablet         Time Spent on discharge is more than 30 minutes in the examination, evaluation, counseling and review of medications and discharge plan. Signed: Thank you Latoya Segura MD for the opportunity to be involved in this patient's care.     Electronically signed by Rajan Daly MD on 2/10/2023 at 7:24 PM

## 2023-02-12 LAB
GLUCOSE BLD STRIP.AUTO-MCNC: 100 MG/DL (ref 70–108)
GLUCOSE BLD STRIP.AUTO-MCNC: 111 MG/DL (ref 70–108)
GLUCOSE BLD STRIP.AUTO-MCNC: 146 MG/DL (ref 70–108)

## 2023-02-12 PROCEDURE — 82948 REAGENT STRIP/BLOOD GLUCOSE: CPT

## 2023-02-12 PROCEDURE — 2580000003 HC RX 258: Performed by: PHYSICAL MEDICINE & REHABILITATION

## 2023-02-12 PROCEDURE — 1180000000 HC REHAB R&B

## 2023-02-12 PROCEDURE — 6370000000 HC RX 637 (ALT 250 FOR IP): Performed by: PHYSICAL MEDICINE & REHABILITATION

## 2023-02-12 PROCEDURE — 2700000000 HC OXYGEN THERAPY PER DAY

## 2023-02-12 RX ADMIN — OXYCODONE HYDROCHLORIDE 5 MG: 5 TABLET ORAL at 20:45

## 2023-02-12 RX ADMIN — Medication 1 TABLET: at 09:23

## 2023-02-12 RX ADMIN — Medication 6 MG: at 20:45

## 2023-02-12 RX ADMIN — QUETIAPINE FUMARATE 25 MG: 25 TABLET ORAL at 20:45

## 2023-02-12 RX ADMIN — ROSUVASTATIN 10 MG: 10 TABLET, FILM COATED ORAL at 09:24

## 2023-02-12 RX ADMIN — ACETAMINOPHEN 650 MG: 325 TABLET ORAL at 05:41

## 2023-02-12 RX ADMIN — OXYCODONE HYDROCHLORIDE 5 MG: 5 TABLET ORAL at 09:26

## 2023-02-12 RX ADMIN — RIVAROXABAN 15 MG: 15 TABLET, FILM COATED ORAL at 09:21

## 2023-02-12 RX ADMIN — ACETAMINOPHEN 650 MG: 325 TABLET ORAL at 16:40

## 2023-02-12 RX ADMIN — ACETAMINOPHEN 650 MG: 325 TABLET ORAL at 09:25

## 2023-02-12 RX ADMIN — FOLIC ACID 1 MG: 1 TABLET ORAL at 09:22

## 2023-02-12 RX ADMIN — MICONAZOLE NITRATE: 20 POWDER TOPICAL at 09:50

## 2023-02-12 RX ADMIN — ASPIRIN 81 MG 81 MG: 81 TABLET ORAL at 09:22

## 2023-02-12 RX ADMIN — DOCUSATE SODIUM 100 MG: 100 CAPSULE, LIQUID FILLED ORAL at 09:22

## 2023-02-12 RX ADMIN — FERROUS SULFATE TAB 325 MG (65 MG ELEMENTAL FE) 325 MG: 325 (65 FE) TAB at 09:21

## 2023-02-12 RX ADMIN — ACETAMINOPHEN 650 MG: 325 TABLET ORAL at 20:44

## 2023-02-12 RX ADMIN — INSULIN LISPRO 5 UNITS: 100 INJECTION, SOLUTION INTRAVENOUS; SUBCUTANEOUS at 09:29

## 2023-02-12 RX ADMIN — TRAZODONE HYDROCHLORIDE 50 MG: 50 TABLET ORAL at 20:45

## 2023-02-12 RX ADMIN — SODIUM CHLORIDE, PRESERVATIVE FREE 10 ML: 5 INJECTION INTRAVENOUS at 09:50

## 2023-02-12 RX ADMIN — OXYCODONE HYDROCHLORIDE 5 MG: 5 TABLET ORAL at 16:41

## 2023-02-12 RX ADMIN — RIVAROXABAN 15 MG: 15 TABLET, FILM COATED ORAL at 16:41

## 2023-02-12 RX ADMIN — FUROSEMIDE 20 MG: 20 TABLET ORAL at 09:22

## 2023-02-12 RX ADMIN — DOCUSATE SODIUM 283 MG: 283 LIQUID RECTAL at 09:50

## 2023-02-12 RX ADMIN — SODIUM CHLORIDE, PRESERVATIVE FREE 10 ML: 5 INJECTION INTRAVENOUS at 20:42

## 2023-02-12 RX ADMIN — METOPROLOL SUCCINATE 25 MG: 25 TABLET, FILM COATED, EXTENDED RELEASE ORAL at 09:23

## 2023-02-12 RX ADMIN — NALOXEGOL OXALATE 12.5 MG: 12.5 TABLET, FILM COATED ORAL at 05:41

## 2023-02-12 RX ADMIN — INSULIN GLARGINE 28 UNITS: 100 INJECTION, SOLUTION SUBCUTANEOUS at 20:43

## 2023-02-12 ASSESSMENT — ENCOUNTER SYMPTOMS
TROUBLE SWALLOWING: 0
CONSTIPATION: 0
BACK PAIN: 1
NAUSEA: 0
WHEEZING: 0
ABDOMINAL PAIN: 0
DIARRHEA: 0
VOMITING: 0
COUGH: 0
SHORTNESS OF BREATH: 0
RHINORRHEA: 0
SORE THROAT: 0

## 2023-02-12 ASSESSMENT — PAIN DESCRIPTION - LOCATION
LOCATION: HIP

## 2023-02-12 ASSESSMENT — PAIN DESCRIPTION - DESCRIPTORS
DESCRIPTORS: ACHING
DESCRIPTORS: ACHING;THROBBING
DESCRIPTORS: ACHING

## 2023-02-12 ASSESSMENT — PAIN SCALES - GENERAL
PAINLEVEL_OUTOF10: 8
PAINLEVEL_OUTOF10: 9
PAINLEVEL_OUTOF10: 8

## 2023-02-12 ASSESSMENT — PAIN - FUNCTIONAL ASSESSMENT
PAIN_FUNCTIONAL_ASSESSMENT: ACTIVITIES ARE NOT PREVENTED

## 2023-02-12 ASSESSMENT — PAIN DESCRIPTION - ORIENTATION
ORIENTATION: RIGHT

## 2023-02-12 NOTE — PLAN OF CARE
Problem: Chronic Conditions and Co-morbidities  Goal: Patient's chronic conditions and co-morbidity symptoms are monitored and maintained or improved  Outcome: Progressing  Chronic conditions are stable, pt has trach, cleans cannula herself     Problem: Skin/Tissue Integrity  Goal: Absence of new skin breakdown  Description: 1. Monitor for areas of redness and/or skin breakdown  2. Assess vascular access sites hourly  3. Every 4-6 hours minimum:  Change oxygen saturation probe site  4. Every 4-6 hours:  If on nasal continuous positive airway pressure, respiratory therapy assess nares and determine need for appliance change or resting period. Outcome: Progressing  Excoriation to R abd fold, applies miconazole poweder, moisture to buttocks and uses Triad cream, pillow support side to side, pt refuses to get out of bed today, offered to get her out of bed for meals, pt refused,  stating she worked too hard with therapy on Saturday and she does not want to get up, pts  states she didn't do anything at home before this admission so shes tired. Problem: Safety - Adult  Goal: Free from fall injury  Outcome: Progressing  Pt will remain free of falls. Pt is 2 assist with jose haider, pt refused to get out of bed today!

## 2023-02-12 NOTE — PLAN OF CARE
Problem: Skin/Tissue Integrity  Goal: Absence of new skin breakdown  Description: 1. Monitor for areas of redness and/or skin breakdown  2. Assess vascular access sites hourly  No new skin issues noted this shift. Problem: Safety - Adult  Goal: Free from fall injury  Outcome: Progressing    No falls sustained at this time. Patient alert to call light and uses appropriately to alert staff to needs. Bed/chair alarms in use. Care plan reviewed with patient. Patient verbalize understanding of the plan of care and contribute to goal setting.

## 2023-02-12 NOTE — PROGRESS NOTES
Patient noted to be yelling out \"help me help\" This nurse entered room patient pulling at Trach mask, and and rocking upper half of body back and forth. Patient denied difficulty breathing, but removed inner cannula of trach. Patient noted to be coughing up scant amt of mucus from trach. Patient complained of right hip pain rated 20 out of scale of 0-10; 10 the worst. Patient unable to describe pain, and would not let go of this nurse hand so nurse could get pain medicine. Nurse repositioned patient multiple times slightly in attempt to comfort and decrease pain. Patient began rubbing hand down the front of her as if wiping something off the front of her gown. Patient continues to cough up scant amounts of mucus. Patient then began complaining of chest pain and rubbing left upper chest. This nurse notified Dr. Melia Chen and while on phone patient stated chest pain was only when coughing. Prn pian medication obtained while patient assessed by other nurse, and patient eventually took pain medication whole in applesauce. Dr. Melai Chen stated that is not heart related chest pain. STAT EKG discontinued. NO new orders received.

## 2023-02-12 NOTE — PLAN OF CARE
"PSYCHIATRY DAILY INPATIENT PROGRESS NOTE  SUBSEQUENT HOSPITAL VISIT    ENCOUNTER DATE: 10/6/2020  [facility]    DATE OF ADMISSION: 10/3/2020 11:05 PM  LENGTH OF STAY: 3 days      HISTORY    CHIEF COMPLAINT   Fidel Nelson is a 23 y.o. male, seen during daily manley rounds on the inpatient unit.  Fidel Nelson presents with the chief complaint of depression/SI, "I had thoughts of killing myself."    HPI   (Elements: Location, Quality, Severity, Duration, Timing, Content, Modifying Factors, Associated Signs & Symptoms)    The patient was seen and examined. The chart was reviewed.     Reviewed notes by LPN, LMSW, CTRS, RNs, and RD from the last 24 hours.    The patient's case was discussed with the treatment team and care providers today, including RNs, CTRS, and LMSW.    Staff reports no behavioral or management issues.     The patient has been compliant with treatment. The patient denied any side effects.    The patient remains focused on getting help . He is able to discuss his precipitating stressors which include relationship stressors, financial stressors, occupational stressors and feelings of isolation; he is better able to discuss solutions to his stressors.     Symptoms are improving from yesterday as documented below. He feels more hopeful and future oriented and goal directed.    Improving Symptoms of Depression: less diminished mood or loss of interest/anhedonia; less irritability, less diminished energy, less change in sleep, less change in appetite, less diminished concentration or cognition or indecisiveness, no PMA/R, less excessive guilt or hopelessness or worthlessness, no suicidal ideations     Improving Changes in Sleep: no trouble with initiation/maintenance, no early morning awakening with inability to return to sleep     Improved Suicidal/(no) Homicidal ideations: no active/passive ideations, no organized plans, no future intentions     Denied past or current Symptoms of psychosis: " Individualized Plan of [facility]    Rehabilitation physician: Dr. Tea Lopez Date: 2/10/2023     Rehabilitation Diagnosis: Closed intertrochanteric fracture, right, initial encounter [facility]) [S72.141A]      Rehabilitation impairments: self care, mobility, bowel/bladder management, pain management, safety, and cognitive function    Factors facilitating achievement of predicted outcomes: Family support, Motivated, Cooperative, and Pleasant  Barriers to the achievement of predicted outcomes: Pain, Cognitive deficit, Impulsivity, Limited safety awareness, Limited insight into deficits, Unrealistic expectations, Decreased endurance, Lower extremity weakness, Medical complications, Stairs at home, Skin Care, Wound Care, Medication managment, and presence of tracheostomy    Patient Goals: Improve independence with mobility, Improvement of mobility at a wheelchair level, Increase overall strength and endurance, Increase balance, Increase endurance, Increase independence with activities of daily living, Improve cognition, Increase self-awareness, Increase safety awareness, Increase community integration, Increase socialization, Functional communication with caregivers, Integrate appropriate pain management plan, Assure adequate nutritional option for discharge, Continence of bowel and bladder, and Provide appropriate patient and family education      NURSING:  Nursing goals for Silvia Jose while on the rehabilitation unit will include:  Continence of bowel and bladder, Adequate number of bowel movements, Urinate with no urinary retention >300ml in bladder, Complete bladder protocol with werner removal, Maintain O2 SATs at an acceptable level during stay, Effective pain management while on the rehabilitation unit, Establish adequate pain control plan for discharge, Absence of skin breakdown while on the rehabilitation unit, Improved skin integrity via assessments including wound measurements, Avoidance of any hospital acquired infections, No signs/symptoms of infection at the wound site, Freedom from injury during hospitalization, and Complete education with patient/family with understanding demonstrated regarding disease process and resultant impairment     In order to achieve these goals, nursing interventions may include bowel/bladder training, education for medical assistive devices, medication education, O2 saturation management, energy conservation, stress management techniques, fall prevention, alarms protocol, seating and positioning, skin/wound care, pressure relief instruction, dressing changes, infection protection, DVT prophylaxis, assistance with safe transfers , and/or assistance with bathroom activities and hygiene. PHYSICAL THERAPY:  Goals:        Short Term Goals  Time Frame for Short Term Goals: 1 wk  Short Term Goal 1: Pt will go from supine <->sit, mod +1 to get in/out of bed. Short Term Goal 2: Pt will get up/down from bed, into JEANNETTE stedy device, +1 mod assist to progress to up to RW  Short Term Goal 3: Pt will  the JEANNETTE stedy device, +1 min assist x5 min, to progress to standing with RW  Short Term Goal 4: Pt will propel w/c >= 50 ft, tile surface, CGA for home mobility  Long Term Goals  Time Frame for Long Term Goals : 3 wks from evaluation. Long Term Goal 1: Pt to go supine <->sit, min +1 to get in/out of bed. Long Term Goal 2: Pt to get up/down from bed or w/c +1 min assist to get up to walk. Long Term Goal 3: Pt to perform stand/pivot transfer with RW, +1 min assist to get in/out of w/c. Long Term Goal 4: Pt to walk with RW >= 10 ft, mn +1 to progress to home mobility  Long Term Goal 5: Pt to propel w/c >= 50 ft, Mod I, for home mobility  Additional Goals?: Yes  Long term goal 6: Pt to get in/out of car, min +1 for transportation needs.     Plan of Care: Patient to be seen by physical therapy services  (5x/ wk 90 min) no hallucinations, delusions, disorganized thinking, disorganized behavior or abnormal motor behavior, or negative symptoms      Denied past or current Symptoms of nisa or hypomania: no elevated, expansive, or irritable mood with no increased energy or activity; with no inflated self-esteem or grandiosity, decreased need for sleep, increased rate of speech, FOI or racing thoughts, distractibility, increased goal directed activity or PMA, or risky/disinhibited behavior     Improving Symptoms of DIONICIO: less excessive anxiety/worry/fear, with less symtpoms of restlessness, fatigue, poor concentration, irritability, muscle tension, and sleep disturbance     Continued Symptoms of OCD: less obsessions, no compulsions     PSYCHOTHERAPY ADD-ON +30299   30 (16-37*) minutes    Time: 16 minutes  Participants: Met with patient    Therapeutic Intervention Type: behavior modifying psychotherapy, supportive psychotherapy  Why chosen therapy is appropriate versus another modality: relevant to diagnosis, patient responds to this modality, evidence based practice    Target symptoms: depression, anxiety   Primary focus: depression, psychosocial stressors  Psychotherapeutic techniques: supportive and problem solving techniques; psycho-education; isnight oriented techniques     Outcome monitoring methods: self-report, observation    Patient's response to intervention:  The patient's response to intervention is accepting.    Progress toward goals:  The patient's progress toward goals is fair .          ROS  General ROS: negative  Ophthalmic ROS: negative  ENT ROS: negative  Allergy and Immunology ROS: negative  Hematological and Lymphatic ROS: negative  Endocrine ROS: negative  Respiratory ROS: no cough, shortness of breath, or wheezing  Cardiovascular ROS: no chest pain or dyspnea on exertion  Gastrointestinal ROS: no abdominal pain, change in bowel habits, or black or bloody stools  Genito-Urinary ROS: no dysuria, trouble voiding, or  "hematuria  Musculoskeletal ROS: negative  Neurological ROS: no TIA or stroke symptoms  Dermatological ROS: negative    PAST MEDICAL HISTORY   Past Medical History:   Diagnosis Date    ADHD (attention deficit hyperactivity disorder)     Allergic reaction     Anxiety     Asperger's disorder     Eczema     Hx of psychiatric care     OCD (obsessive compulsive disorder)     Psychiatric problem     Scoliosis     pt says mild    Suicide attempt            PSYCHOTROPIC MEDICATIONS   Scheduled Meds:   ARIPiprazole  2 mg Oral Daily    FLUoxetine  40 mg Oral Daily    folic acid  1 mg Oral Daily    multivitamin  1 tablet Oral Daily     Continuous Infusions:  PRN Meds:.acetaminophen, aluminum-magnesium hydroxide-simethicone, docusate sodium, hydrOXYzine pamoate, loperamide, nicotine, OLANZapine **AND** OLANZapine        EXAMINATION    VITALS   Vitals:    10/06/20 0746   BP: 125/89   Pulse: 89   Resp: 20   Temp: 98.2 °F (36.8 °C)     Body mass index is 21.54 kg/m².      CONSTITUTIONAL  General Appearance: WM, normal weight, in casual attire; NAD     MUSCULOSKELETAL  Muscle Strength and Tone:  normal  Abnormal Involuntary Movements:  none  Gait and Station:  normal; non-ataxic     PSYCHIATRIC   Level of Consciousness: awake, alert  Orientation: p/p/t/s  Grooming:  adequate to circumstances  Psychomotor Behavior: no PMA/R  Speech: nl r/t/v/s  Language:  English fluent  Mood: "much better"  Affect: improving range, less anxious/dysthymic- improving  Thought Process:  linear and organized; goal directed  Associations:  intact; no DARIN  Thought Content:  denied AVH/delusions; denied HI, less SI  Memory:  intact to recent and remote events  Attention:  intact to conversation; not distractible   Fund of Knowledge: low average for age and education   Estimate if Intelligence: low average based on work/education history, vocabulary and mental status exam  Insight: good/improving- seeks help, understands/accepts " Anticipated interventions may include therapeutic exercises, gait training, neuromuscular re-ed, transfer training, community reintegration, bed mobility, w/c mobility and training. OCCUPATIONAL THERAPY:  Goals:             Short Term Goals  Time Frame for Short Term Goals: until discharge  Short Term Goal 1: Pt will tolerate 12-15 min EOB ADL task with S to improve trunk control and activity tolerance required for EOB ADLs. Short Term Goal 2: Pt will complete sit-stand t/fs with mod A x 1 with minimal cues of technique to progress towards BSC t/fs  Short Term Goal 3: Pt will tolerate standing 1 min with min A for increased ease of clothing management nad LB bathing routine  Short Term Goal 4: Pt will tolerate further assessment of functional t/fs by OTR when appropriate  Long Term Goals  Time Frame for Long Term Goals : until discharge  Long Term Goal 1: Pt will complete sit-stand t/fs with SBA x 1 with minimal cues of technique to progress towards BSC t/fs  Long Term Goal 2: Pt will perform UB/LB dressing and bathing with Min A and minimal cues for ECT to improve functional independence. Plan of Care: Patient to be seen by occupational therapy services 5x per week for 90 minutes     Anticipated interventions may include ADL and IADL retraining, strengthening, safety education and training, patient/caregiver education and training, equipment evaluation/ training/procurement, neuromuscular reeducation, wheelchair mobility training. SPEECH THERAPY:   Short Term Goals  Time Frame for Short Term Goals: 1 week  Goal 1: Patient will complete functional problem solving and reasoning tasks with 70% accuracy and moderate cuing in order to improve completion of ADLs/IADLs at discharge.   Goal 2: Patient will complete functional immediate, working, and delayed recall tasks of 4+ units of information, with or without use of trained recall strategies, with 70% accuracy given mod cues to improve retention of illness  Judgment:  good- no bx issues, compliant and cooperative         DIAGNOSTIC TESTING   Laboratory Results  No results found for this or any previous visit (from the past 24 hour(s)).      ASSESSMENT      IMPRESSION   MDD, recurrent, severe without psychotic features  OCD  DIONICIO  h/o ADHD     Pervasive Developmental Disorder/Aspergers     Psychosocial stressors     Nicotine abuse              MEDICAL DECISION MAKING          PROBLEM LIST AND MANAGEMENT PLANS; PRESCRIPTION DRUG MANAGEMENT  Compliance: yes  Side Effects: no  Regimen Adjustments:      Depression: pt counseled  -resumed/continue home medication of prozac at 40 mg po q day; will continue to change/optimize as indicated  -Started/continue trial of adjunctive Abilify 2 mg po q day     OCD/DIONICIO: pt counseled  -prozac as above  -vistaril prn     H/o ADHD: pt counseled  -hold clonidine; consider trial of straterra     PDD/Aspergers: pt counseled     Psychosocial stressors: pt counseled  -SW consulted and assisting with resources     Nicotine abuse: pt counseled  -nicotine patch dermal prn if needed     DIAGNOSTIC TESTING  Labs reviewed with patient; follow up pending labs     Disposition:  -SW to assist with aftercare planning and collateral  -Once stable discharge home with outpatient follow up care   -Continue inpatient treatment under a [facility] and/or [facility] for danger to self  and grave disability as evident by severe depression with SI and significant psychosocial stressors.   -The patient is improving and will be stable for discharge home tomorrow and transitioning to outpatient treatment.     NEED FOR CONTINUED HOSPITALIZATION  Psychiatric illness continues to pose a potential threat to life or bodily function, of self or others, thereby requiring the need for continued inpatient psychiatric hospitalization: Yes    Protective inpatient pyschiatric hospitalization required while a safe disposition plan is enacted: Yes    Patient stabilized and ready for  pertinent medical information. Goal 3: Patient will complete functional thought organization and sequencing exercises with 80% accuracy and moderate cuing  in order to improve  completion  of  ADLs/IADLs. Goal 4: Patient will complete sustained, selective, alternating, and divided attention tasks with no more than three  errors/redirections in five minutes/one task in order to allow for safe return to driving at discharge. Plan of Care: Pt to be seen by speech therapy services 30 minutes 5 times per week. Anticipated interventions may include speech/language/communication therapy, cognitive training, group therapy, education, and/or dysphagia therapy based on the above goals. CASE MANAGEMENT:  Goals:   Assist patient/family with discharge planning, patient/family counseling,  and coordination with insurance during the inpatient rehabilitation stay. Other members of the multidisciplinary rehabilitation team that will be involved in the patient's plan of care include recreational therapy, dietary, respiratory therapy, and neuropsychology. Medical issues being managed closely and that require 24 hour availability of a physician:  Bowel/Bladder function, Weight bearing precautions, Wound care, Pain management, Infection protection, DVT prophylaxis, Fall precautions, Fluid/Electrolyte balance, Nutritional status, Respiratory needs, Anemia, and History of heart disease                                           Physician anticipated functional outcomes: Improved independence with functional measures   Estimated length of stay for this admission : probably 2~3 weeks  Medical Prognosis: Good  Anticipated disposition: Home. The potential to achieve the above medical and rehabilitative goals is good.     This plan of care has been developed with the assistance and input of the discharge from inpatient psychiatric unit: No      STAFF:   James Tapia MD  Psychiatry     multidisciplinary rehabilitation team.  The plan was reviewed with the patient. The patient has had the opportunity to provide input to the therapy team.    I have reviewed this Individualized Plan of Care and agree with its contents. Above documentation has been expanded, modified, adjusted to reflect the findings of my evaluations and goals for the patient.     Physician:  Jin Hodge MD

## 2023-02-12 NOTE — PROGRESS NOTES
Physical Medicine & Rehabilitation Progress Note    Chief Complaint:  Right hip fracture, low back compression fracture    Subjective:    Emilee Turner is a 81 y.o. 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 The patient previously was admitted to inpatient rehab service from 2022 to 2022 due to critical care myopathy and debility/physical decondition as result of COVID-19 pneumonia. She was discharged to home with referral for home care service on 2022. The patient developed progressive difficulty breathing in 2022 and was found to have glottic stenosis and eventually received tracheostomy by Dr. Trish Saldana on 2022. The patient's  says the patient also developed progressively worsening lower back pain in summer 2022 and saw orthopedics surgeon at [facility]. The patient has been says the patient was diagnosed of having \"lumbar spine stress fracture\". No surgical intervention was performed. She was treated with brace which she later abandoned due to discomfort associate with brace usage.      The patient was recently hospitalized from 2023 to 2023 initially for abdominal bloating, nausea and leg swelling. She was found to have elevated D-dimer. Subsequent testing revealed right lower lobe pulmonary embolism and right lower extremity DVT. She was at initially treated with IV heparin and later transition to [address] [place] [address]. The patient was brought to ER on 2/1/2022 because of progressively increased leg swelling, and nausea/vomiting associated with decreased oral intake. She was found to have BNP of 256.7 and Bumex was prescribed. She then had a fall accident  when she was going to toilet while she was in ER on 2/1/2022. The fall resulted severe right hip pain. X-ray of right hip and pelvis revealed acute right femoral intertrochanteric fracture with subtrochanteric extension. Orthopedic service was consulted. Xarelto was held in preparation for surgical management. Cardiology was consulted on 2/2/2023 for elevated BNP and Lasix was started. Because of difficulty voiding with urinary retention, urology was also consulted on 2/2/2023. Urology service placed Wen with some difficulty on 2/2/2023. Therefore Wen was kept in place. On 2/3/2023 the patient underwent open treatment of right intertrochanteric fracture with cephalomedullary nail by Dr. Lacey Sanchez. She is allowed weightbearing as tolerated at the right lower extremity postoperatively. PM&R was consulted on 2/5/2023. During the evaluation the patient's  said that the patient began having progressively worsened low back pain starting in summer 2022 began interfering the patient's daily function. Therefore she is sore orthopedic physician at [facility]. Imaging tests were done and the patient was told that she had \" stress fracture\" in her spine. She was provided with a lumbar spine brace to wear but she was not compliant with spine brace application because of discomfort it produced while she was wearing it.   Because no outside lumbar spine images study report was available for review, x-ray of lumbar spine was ordered and performed on 2/6/2023 and showed moderate vertebral body spondylosis in the lumbar and lower thoracic spine, moderate degenerative facet arthropathy involving at least lower 3 lumbar levels, lumbar spine osteoporosis, mild superior endplate depression fracture at L2, L3 and L4. Lumbar spine MRI was recommended by radiologist.  Therefore primary team ordered lumbar spine MRI which was performed this afternoon revealing acute fracture with a fluid cleft associated with endplate at R9-O6 levels with mild height loss at each vertebral bodies. The patient was found to have severe anemia with Hgb/Hct dropped from 7.3/24 on 2/4/2023 down to 5.1/16.9 on 2/6/2023. Therefore she was given blood transfusion for few units. CT of abdomen and pelvis was ordered to rule out possible intra-abdominal source of blood loss. CT abdomen and pelvis done on 2/8/2023 show only pericolonic inflammation with diverticula related to acute diverticulitis, acute on chronic compression fracture of L2, L3, L4 and L5, mild, and marked osteopenia. The patient's hospital course also complicated by intermittent confusion, and development of gluteus region skin breakdown. The patient says her pain at the right lower extremity remains significant. She rated the pain intensity at 10/10 level. She is still on Tylenol every 6 hours. She took oxycodone 5 mg 3 times yesterday. She says she did not sleep well last night. She was given melatonin 6 mg and trazodone 50 mg last night. She also had Seroquel 25 mg last night. Her right lower extremity remains weak due to the pain and heaviness sensation. She tolerated the intensive inpatient rehabilitative treatment over the weekend. Rehabilitation:  PT: Reviewed. Bed Mobility:  Rolling to Left: Moderate Assistance, X 2, for partial roll to allow for pillow positioning under RT hip at end of session for pressure relief.    Sit to Supine: Maximum Assistance, X 2 Transfers:  Sit to Stand: Moderate Assistance, X 2  Stand to Sit:Moderate Assistance, X 2  To/From Bed and Chair: Dependent, with Ples Getting     Balance:  Static Sitting Balance:  Stand By Assistance  Static Standing Balance: Contact Guard Assistance, within the [address]. Pt tolerated approx 10 sec of upright standing with wt bearing on forearms of front bar of device x2 trials. Wheelchair Mobility:  Minimal Assistance, with turning to the LT. SBA with straight path  Extremities Used: Bilateral Upper Extremities  Type of Chair:Manual  Surface: Level Tile  Distance: 30 ft  Quality: Slow Velocity, Short Strokes, and Decreased Fluidity       OT: Reviewed. ADL:  EATING:Setup or clean-up assistance. Tomi Balk CARE Score: 5. ORAL HYGIENE:Setup or clean-up assistance. Rilla Balk CARE Score: 5.     TOILETING HYGIENE:Dependent. werner catheter in place. pt martha ble to empty catheter at this time. Pt requires total assist for posterior flaquito care while standing in jose haider. CARE Score: 1. SHOWERING/BATHING:Substantial/maximal assistance. seated EOB to perform sponge bath. Assist required for back, upper and lower legs, under belly folds, and anterior/posterior flaquito region. pt able to assist with chest, arms, and underarms. CARE Score: 2.     UPPER BODY DRESSING:Substantial/maximal assistance. seated EOB for hospital gown.  is to bring clothing from home in. CARE Score: 2. LOWER BODY DRESSING:Dependent. Tomi Lee CARE Score: 1. FOOTWEAR:Dependent   . CARE Score: 1.     TOILET TRANSFER:  . Tomi Lee CARE Score: 88. Not attempted due to medical condition or safety concern     BALANCE:  Sitting Balance:  Stand By Assistance, [address], Minimal Assistance. Varies from SBA to Minimal A. Pt tolerated x11 minutes sitting EOB during sponge bath initially requiring SBA and after 5-6 minutes she required occasional minimal assist due to posterior and lateral lean to the left.  At 11 minute juan pt allowed rest break by leaning onto pillow with therapist support until nurse entered room to assist with transfer to recliner chair  Standing Balance: Moderate Assistance, X 1. Tolerates x2 minutes In jose stedy and leaning over bar for support while therapist provides posterior flaquito care. BED MOBILITY:  Supine to Sit: Maximum Assistance, X 1, with head of bed raised, with rail, with verbal cues , with increased time for completion       TRANSFERS:  Sit to Stand: Moderate Assistance, X 2, with Renan Son, with increased time for completion, cues for hand placement. To/From Bed and Chair: with Renan Son. ST: Reviewed. [place] Cognitive Assessment [facility]) version 7.1 completed. Patient scored 20/30. Normal is greater than or equal to 26/30. DIAGNOSTIC IMPRESSIONS:    Patient presents with mild cognitive impairment evidenced by aforementioned deficits. Speech and voice appear to be [facility] with no presence of dysarthria, dysphonia, or aphonia; patient intelligible at the conversation level with approximately 100% accuracy given placement of PMV for functional, verbal communication attempts. Expressive and receptive language skills grossly intact with no apparent communicative breakdowns. Patient currently consuming regular diet with thin liquids with no overt difficulty. No barriers to success foreseen. Patient appears motivated with good familial support. Skilled ST services are recommended at this time in order to improve the aforementioned deficits and permit potential return to OF. Anticipate needs for supervision with IADLs, driving evaluation, and ongoing skilled ST services via [facility] or OP services at discharge. Review of Systems:  Review of Systems   Constitutional:  Positive for fatigue. Negative for chills, diaphoresis and fever. HENT:  Negative for hearing loss, rhinorrhea, sneezing, sore throat and trouble swallowing. Eyes:  Negative for visual disturbance.    Respiratory: Negative for cough, shortness of breath and wheezing. Cardiovascular:  Negative for chest pain and palpitations. Gastrointestinal:  Negative for abdominal pain, constipation, diarrhea, nausea and vomiting. Genitourinary:  Negative for dysuria. Musculoskeletal:  Positive for arthralgias (Bilateral hip, right groin, and right thigh), back pain (Lower back), gait problem and myalgias (Right thigh). Negative for neck pain. Skin:  Negative for rash. Neurological:  Positive for weakness (Bilateral lower extremities especially on the right side) and numbness (Bilateral feet). Negative for dizziness, tremors, seizures, speech difficulty, light-headedness and headaches. Psychiatric/Behavioral:  Positive for sleep disturbance. Negative for dysphoric mood and hallucinations. The patient is not nervous/anxious.          Objective:  /66   Pulse 94   Temp 99 °F (37.2 °C) (Oral)   Resp 16   Ht 5' 2\" (1.575 m)   Wt 230 lb 6.1 oz (104.5 kg)   LMP  (LMP Unknown)   SpO2 97%   BMI 42.14 kg/m²   Physical Exam   General:  well-developed, well nourished morbid obese  female ; in no acute distress ; appropriate affect & mood; sitting on reclining chair comfortably; receiving humidified oxygen supplement via trach mask; speaking in whispered manner due to presence of tracheostomy  Eyes: pupil equally round ; extra-ocular motion intact bilaterally  Head, Ear, Nose, Mouth & Throat : normocephalic ; no discharge from ears or nose ; no deformity ; no facial swelling ; oral mucosa pink  Neck :  supple ; presence of tracheostomy at anterior neck; no tenderness; mild muscle spasm at cervical paraspinal muscle and upper trapezius muscle  Cardiovascular : regular rate & rhythm ; normal S1 & S2 heart sound ; no murmur ; normal peripheral pulse at bilateral upper & lower extremities  Pulmonary : Breath sounds present at bilateral lung fields; no wheezing ; no rale; no crackle  Gastrointestinal : soft, protruded abdomen without tenderness ; normal bowel sound present    : Wen catheter in place draining yellowish urine  Skin: no skin lesion or rash ; presence of adhesive dressing at right lateral upper and lower thigh; no pitting edema at bilateral upper extremities; 1+ pitting edema at bilateral ankles; presence of PICC line on right arm near elbow area  Musculoskeletal : no limb asymmetry; no limb deformity; tenderness at right lateral and anterior thigh, right lateral hip and right groin area; mild tenderness at the entire right knee; no tenderness at bilateral upper extremities & the rest of bilateral lower extremities; no palpable mass at limbs ; right hip and right knee joint laxity not assessed; no joints laxity or crepitation at other limb joints; shoulder flexion and abduction passive ROM reaching 160 degrees bilaterally; right hip flexion passive ROM reaching 30 degrees and right knee flexion passive ROM reaching 30 degrees limited by pain at right hip and thigh; left hip flexion passive ROM reaching 90 degrees; ankle dorsiflexion passive ROM reaching 0 degrees bilaterally; otherwise normal functional joints ROM at the rest of bilateral upper & lower extremities  Cerebral :  alert ; awake ; oriented to place, person and time; follow 1-2 steps verbal command  Cerebellum : no dysmetria with bilateral finger-to-nose test but with a slight tremor; unable to perform heel-to-shin test on the right side; dysmetria with left heel-to-shin test  Cranial nerve : CN II to XII function grossly intact  Sensory : intact light touch and pin prick sensation at bilateral upper extremities; reduced light touch and pinprick sensation at distal bilateral lower extremities from upper leg region downward in sock distribution  Motor : normal tone at bilateral upper & left lower extremities ; muscle tone not assessed on right lower extremity due to the pain; 2-/5 muscle strength at right hip flexion, abduction and adduction; 2+/5 to 3-/5 muscle strength at the left hip flexion; 3-/5 muscle strength at right knee extension ; 2+/5 muscle strength at the right knee flexion; 4+/5 muscle strength at the left knee flexion ; otherwise 5/5 muscle strength at the rest of bilateral upper and the lower extremities  Reflex : 1+ bilateral brachioradialis reflexes; 0 bilateral biceps and bilateral knees reflexes   Pathological Reflex :  No Roberta's sign ; no ankle clonus  Gait : Not assessed      Diagnostics:   Recent Results (from the past 24 hour(s))   POCT glucose    Collection Time: 02/12/23 12:32 PM   Result Value Ref Range    POC Glucose 146 (H) 70 - 108 mg/dl         Impression:  Right femoral intertrochanteric fracture with subtrochanteric extension due to fall requiring open reduction and internal fixation with cephalomedullary nail  Glottic stenosis requiring tracheostomy  Persistent chronic low back pain due to lumbar and lower thoracic spine spondylosis with degenerative facet arthropathy at the lower 3 lumbar levels, and acute L2-S1 endplate fractures  Right posterior tibial, left greater saphenous, left peroneal and left anterior tibial veins deep vein thrombosis, and right lower lobe pulmonary embolism  Acute anemia requiring blood transfusion  Lumbar spine osteoporosis  Gluteal region decubitus ulcer  Diabetes mellitus type 2 with poor blood glucose control and complicated by bilateral lower extremities diabetic polyneuropathy  Morbid obesity  Hypertension  Hyperlipidemia  History of lower back pain with history of \"lumbar stress fracture\"  History of COVID infection with pneumonia  History of mild cognitive impairment  History of coronary artery disease requiring coronary artery stenting  Grade 1 left ventricular diastolic dysfunction with preserved ejection fraction     The patient's vital signs is stable. She continues to complain significant pain at her right lower extremity mainly at hip and thigh area.   Narcotic pain medication provide good temporary pain relief. She says she did not sleep well last night despite of Seroquel, melatonin and trazodone 50 mg usage. I will increase trazodone dosage to 100 mg. She still has significant weakness at her right lower extremity. The right lower extremity muscle strength also was interfered by the painful symptom. She tolerated the intensive inpatient rehabilitation treatment over the weekend. Her function begins to improve slowly. Plan:  Continues intensive PT/OT/SLP/RT inpatient rehabilitation program at least 3 hours per day, 5 days per week in order to improve functional status prior to discharge. Family education and training will be completed. Equipment evaluations and recommendations will be completed as appropriate. Rehabilitation nursing continues to be involved for bowel, bladder, skin, and pain management. Nursing will also provide education and training to patient and family. Prophylaxis:  DVT: Patient on Xarelto, KENA stocking, intermittent pneumatic compression device.   GI: Colace, Enemeez enema, GlycoLax, Senokot, lactulose as needed, milk of magnesium as needed; add Movantik  Pain: Tylenol, oxycodone as needed, lidocaine patch; add Voltaren gel as needed  Continue aspirin for coronary artery disease  Continue Lasix for lower extremities edema  Continue Lantus insulin, Humalog insulin, Humalog insulin coverage for diabetes mellitus  Continue Seroquel for anxiety and insomnia   Continue Xarelto for bilateral DVT and pulmonary embolism  Continue Toprol-XL for hypertension  Continue Crestor for hyperlipidemia  Continue melatonin, trazodone as needed for insomnia; increase trazodone dosage to 100 mg  Continue multivitamin and ferrous sulfate for anemia  Nutrition: Continue current diet  Bladder: Monitoring signs or symptoms of UTI  Bowel: Monitoring signs or symptoms of constipation   and case management consultations for coordination of care and discharge planning      Missed Therapy Time:  None      Clemente Jackson MD

## 2023-02-13 LAB — GLUCOSE BLD STRIP.AUTO-MCNC: 133 MG/DL (ref 70–108)

## 2023-02-13 PROCEDURE — 1180000000 HC REHAB R&B

## 2023-02-13 PROCEDURE — 97535 SELF CARE MNGMENT TRAINING: CPT

## 2023-02-13 PROCEDURE — 97129 THER IVNTJ 1ST 15 MIN: CPT

## 2023-02-13 PROCEDURE — 99232 SBSQ HOSP IP/OBS MODERATE 35: CPT | Performed by: PHYSICAL MEDICINE & REHABILITATION

## 2023-02-13 PROCEDURE — 2700000000 HC OXYGEN THERAPY PER DAY

## 2023-02-13 PROCEDURE — 82948 REAGENT STRIP/BLOOD GLUCOSE: CPT

## 2023-02-13 PROCEDURE — 2580000003 HC RX 258: Performed by: PHYSICAL MEDICINE & REHABILITATION

## 2023-02-13 PROCEDURE — 97110 THERAPEUTIC EXERCISES: CPT

## 2023-02-13 PROCEDURE — 97530 THERAPEUTIC ACTIVITIES: CPT

## 2023-02-13 PROCEDURE — 94760 N-INVAS EAR/PLS OXIMETRY 1: CPT

## 2023-02-13 PROCEDURE — 51702 INSERT TEMP BLADDER CATH: CPT

## 2023-02-13 PROCEDURE — 97130 THER IVNTJ EA ADDL 15 MIN: CPT

## 2023-02-13 PROCEDURE — 6370000000 HC RX 637 (ALT 250 FOR IP): Performed by: PHYSICAL MEDICINE & REHABILITATION

## 2023-02-13 RX ORDER — TRAZODONE HYDROCHLORIDE 100 MG/1
100 TABLET ORAL NIGHTLY
Status: DISCONTINUED | OUTPATIENT
Start: 2023-02-13 | End: 2023-03-02 | Stop reason: HOSPADM

## 2023-02-13 RX ADMIN — FERROUS SULFATE TAB 325 MG (65 MG ELEMENTAL FE) 325 MG: 325 (65 FE) TAB at 08:54

## 2023-02-13 RX ADMIN — MICONAZOLE NITRATE: 20 POWDER TOPICAL at 08:55

## 2023-02-13 RX ADMIN — ACETAMINOPHEN 650 MG: 325 TABLET ORAL at 10:07

## 2023-02-13 RX ADMIN — TRAZODONE HYDROCHLORIDE 100 MG: 100 TABLET ORAL at 22:35

## 2023-02-13 RX ADMIN — RIVAROXABAN 15 MG: 15 TABLET, FILM COATED ORAL at 08:53

## 2023-02-13 RX ADMIN — QUETIAPINE FUMARATE 25 MG: 25 TABLET ORAL at 22:35

## 2023-02-13 RX ADMIN — ROSUVASTATIN 10 MG: 10 TABLET, FILM COATED ORAL at 08:54

## 2023-02-13 RX ADMIN — MICONAZOLE NITRATE: 20 POWDER TOPICAL at 22:38

## 2023-02-13 RX ADMIN — INSULIN GLARGINE 28 UNITS: 100 INJECTION, SOLUTION SUBCUTANEOUS at 22:49

## 2023-02-13 RX ADMIN — ACETAMINOPHEN 650 MG: 325 TABLET ORAL at 06:32

## 2023-02-13 RX ADMIN — FOLIC ACID 1 MG: 1 TABLET ORAL at 08:54

## 2023-02-13 RX ADMIN — Medication 1 TABLET: at 08:54

## 2023-02-13 RX ADMIN — RIVAROXABAN 15 MG: 15 TABLET, FILM COATED ORAL at 17:12

## 2023-02-13 RX ADMIN — SODIUM CHLORIDE, PRESERVATIVE FREE 10 ML: 5 INJECTION INTRAVENOUS at 22:37

## 2023-02-13 RX ADMIN — SODIUM CHLORIDE, PRESERVATIVE FREE 10 ML: 5 INJECTION INTRAVENOUS at 08:56

## 2023-02-13 RX ADMIN — ACETAMINOPHEN 650 MG: 325 TABLET ORAL at 17:11

## 2023-02-13 RX ADMIN — Medication 6 MG: at 22:35

## 2023-02-13 RX ADMIN — OXYCODONE HYDROCHLORIDE 5 MG: 5 TABLET ORAL at 06:35

## 2023-02-13 RX ADMIN — OXYCODONE HYDROCHLORIDE 5 MG: 5 TABLET ORAL at 11:37

## 2023-02-13 RX ADMIN — ACETAMINOPHEN 650 MG: 325 TABLET ORAL at 22:35

## 2023-02-13 RX ADMIN — METOPROLOL SUCCINATE 25 MG: 25 TABLET, FILM COATED, EXTENDED RELEASE ORAL at 08:54

## 2023-02-13 RX ADMIN — ASPIRIN 81 MG 81 MG: 81 TABLET ORAL at 08:54

## 2023-02-13 RX ADMIN — OXYCODONE HYDROCHLORIDE 5 MG: 5 TABLET ORAL at 22:34

## 2023-02-13 RX ADMIN — FUROSEMIDE 20 MG: 20 TABLET ORAL at 08:54

## 2023-02-13 ASSESSMENT — PAIN DESCRIPTION - ORIENTATION
ORIENTATION: RIGHT

## 2023-02-13 ASSESSMENT — PAIN SCALES - GENERAL
PAINLEVEL_OUTOF10: 8
PAINLEVEL_OUTOF10: 8
PAINLEVEL_OUTOF10: 10
PAINLEVEL_OUTOF10: 10
PAINLEVEL_OUTOF10: 5

## 2023-02-13 ASSESSMENT — PAIN DESCRIPTION - LOCATION
LOCATION: HIP
LOCATION: HIP
LOCATION: LEG

## 2023-02-13 ASSESSMENT — PAIN SCALES - WONG BAKER
WONGBAKER_NUMERICALRESPONSE: 0
WONGBAKER_NUMERICALRESPONSE: 0

## 2023-02-13 ASSESSMENT — ENCOUNTER SYMPTOMS
CONSTIPATION: 0
BACK PAIN: 1
DIARRHEA: 0
VOMITING: 0
ABDOMINAL PAIN: 0
NAUSEA: 0
WHEEZING: 0
TROUBLE SWALLOWING: 0
SORE THROAT: 0
RHINORRHEA: 0
COUGH: 0
SHORTNESS OF BREATH: 0

## 2023-02-13 ASSESSMENT — PAIN DESCRIPTION - DESCRIPTORS
DESCRIPTORS: ACHING
DESCRIPTORS: ACHING

## 2023-02-13 ASSESSMENT — PAIN - FUNCTIONAL ASSESSMENT: PAIN_FUNCTIONAL_ASSESSMENT: PREVENTS OR INTERFERES SOME ACTIVE ACTIVITIES AND ADLS

## 2023-02-13 NOTE — PROGRESS NOTES
[address] [address]  INPATIENT PHYSICAL THERAPY  Daily Note  [facility] - [id number]    Time In: 1330  Time Out: 1400  Timed Code Treatment Minutes: 30 Minutes  Minutes: 30          Date: 2023  Patient Name: Fly Bustillo,  Gender:  female        MRN: [id number]  : 1950  (81 y.o.)     Referring Practitioner: Maximus David MD  Diagnosis: closed intertrochanteric fx, Rt  Additional Pertinent Hx: Carmen Soares is a 73yoF with PMH of recent PE and b/l DVT, macrocytic anemia, CAD, DM2, tracheostomy dependent, obesity, and HTN who presents for leg swelling, nausea, and constipation. She was recently discharged after an 8 day admission for DVTs and PEs. She was placed on Xarelto when discharged and stated that she has been compliant and only missed one dose. She returned due to chronic constipation, feeling ''bloated'', nausea, and LE edema. While being evaluated in the ED the pt had a mechanical fall and developed a R femur fracture. She was found to have supratheraputic INR at 3.83. CT head and C-spine both clear. Pt is s/p R femur ORIF by Dr Giuliana Castro . Pt found to have acute fractures of L2-S1 likely 2/2 osteoporosis, ok for activity as tolerated per Dr Jaspal Lopez . Prior Level of Function:  Lives With: Spouse  Type of Home: House  Home Layout: One level  Home Access: Ramped entrance (or 1 step with no rails.)  Home Equipment: Raquel Snell, rolling, Lift chair (Pt sleeps in lift chair and was using it to assist up to stand PTA)   Bathroom Shower/Tub: Walk-in shower  Bathroom Toilet: Standard  Bathroom Equipment: Built-in shower seat, 3-in-1 commode    ADL Assistance: Needs assistance  Homemaking Assistance: Needs assistance  Ambulation Assistance: Independent  Transfer Assistance: Independent  Active : No  Additional Comments: pt amb short distances in the home with RW,  available  and able to assist as needed.  Has a passi valve that she uses at rest.    Restrictions/Precautions:  Restrictions/Precautions: General Precautions, Fall Risk, Weight Bearing  Right Lower Extremity Weight Bearing: Weight Bearing As Tolerated  Position Activity Restriction  Other position/activity restrictions: tracheostomy/collar with moist air. With venturi mask for out of room use 8 liters portable O2. SUBJECTIVE: Pt. Seated on her BS chair and agrees to therapy session with encouragement. Requests to using the [facility] during session, required increased amount of time to have BM, for flaquito-care and clothing management/change. PAIN: Yes, however, did not rate. Vitals:   Patient Vitals for the past 8 hrs:   BP Patient Position Temp Temp src Pulse Resp SpO2 O2 Device O2 Flow Rate (L/min)   02/13/23 1207 -- -- -- -- -- 16 -- -- --   02/13/23 1137 -- -- -- -- -- 16 -- -- --   02/13/23 1058 -- -- -- -- -- -- 97 % Trach mask 8 L/min   02/13/23 0845 110/66 Up in chair 99 °F (37.2 °C) Oral 94 16 97 % Trach mask --        OBJECTIVE:  Bed Mobility:  Not Tested    Transfers:  Sit to Stand: Maximum Assistance, X 1, with Malachi Sciara from recliner and [facility]  Stand to [address] [place] [address], X 1, with Malachi Sciara  To/From Bed and Chair: Dependent, X 1, with Malachi Sciara    Ambulation:  None    Balance:  Static Standing Balance: Contact Guard Assistance, standing inside jose-stedy while therapist completed flaquito-care, clothing management. Fatigued quickly and required rest break on flaps on jose-stedy throughout. Increased verbal cueing for erect posture and not leaning on forearms when sitting on flaps on jose-stedy and with standing. Neuromuscular Re-education  None    Exercise:  None    Functional Outcome Measures:   Not completed    ASSESSMENT:  Assessment: Patient progressing toward established goals. Activity Tolerance:  Patient tolerance of  treatment: good.      Equipment Recommendations:Equipment Needed: No  Other: Pt has RW, manual w/c and mechanical lift device  Discharge Recommendations: Continue to assess pending progress, Patient would benefit from continued therapy after discharge Continue to assess pending progress    Plan: Current Treatment Recommendations: Strengthening, Balance training, Functional mobility training, Transfer training, Endurance training, Equipment evaluation, education, & procurement, Patient/Caregiver education & training, Safety education & training, Therapeutic activities, Home exercise program, Wheelchair mobility training, Gait training, Pain management  General Plan:  (5x/ wk 90 min)    Patient Education  Patient Education: Plan of Care, Functional Mobility, Reviewed Prior Education, Health Promotion and Wellness Education, Safety, Verbal Exercise Instruction, Precautions/Restrictions, Transfers, - Patient Requires Continued Education    Goals:  Patient Goals : get around o my own without the RW  Short Term Goals  Time Frame for Short Term Goals: 1 wk  Short Term Goal 1: Pt will go from supine <->sit, mod +1 to get in/out of bed. Short Term Goal 2: Pt will get up/down from bed, into JEANNETTE stedy device, +1 mod assist to progress to up to RW  Short Term Goal 3: Pt will  the JEANNETTE stedy device, +1 min assist x5 min, to progress to standing with RW  Short Term Goal 4: Pt will propel w/c >= 50 ft, tile surface, CGA for home mobility  Long Term Goals  Time Frame for Long Term Goals : 3 wks from evaluation. Long Term Goal 1: Pt to go supine <->sit, min +1 to get in/out of bed. Long Term Goal 2: Pt to get up/down from bed or w/c +1 min assist to get up to walk. Long Term Goal 3: Pt to perform stand/pivot transfer with RW, +1 min assist to get in/out of w/c. Long Term Goal 4: Pt to walk with RW >= 10 ft, mn +1 to progress to home mobility  Long Term Goal 5: Pt to propel w/c >= 50 ft, Mod I, for home mobility  Additional Goals?: Yes  Long term goal 6: Pt to get in/out of car, min +1 for transportation needs.     Following session, patient left in safe position with all fall risk precautions in place.

## 2023-02-13 NOTE — PROGRESS NOTES
[address] [address]  Acute Inpatient Rehab Preadmission Assessment     Patient Name: Jan Blas        Ethnicity:Not of , [address], or [place] origin  Race:White  MRN:   [id number]    : 1950  (71 y.o.)  Gender: female      Admitted from:[address]  Initial Assessment     Date of admission to the hospital: 2023 10:03 PM  Date patient eligible for admission:2023     Primary Diagnosis: Right hip fracture      Did patient have surgery? yes - Procedure: Open treatment right intertrochanteric femur fracture the cephalomedullary nail   Dr. Jo Ann Trammell 2/3/2023     Physicians: Marisa Chavez MD, Dr. Raisa Nguyen, Dr. Jo Ann Trammell, Dr. Milan Mt for clinical complications/co-morbidities:   Past Medical History        Past Medical History:   Diagnosis Date    Arthritis      Coronary artery disease      COVID      Diabetic neuropathy ([address]      Bilateral feet numbness    DVT (deep venous thrombosis) ([address] 2023     Right lower extremity    Glottic stenosis       3/2022    Hyperlipidemia      Lower back pain      With diagnosis of having \"stress fracture\" by ortho at [facility]    Primary hypertension      Pulmonary embolism ([address] 2023     Right lower lobe    Type 2 diabetes mellitus ([address] 2018            Financial Information  Primary insurance: Medicare     Secondary Insurance:  [facility] [place]     Has the patient had two or more falls in the past year or any fall with injury in the past year? yes     Did the patient have major surgery during the 100 days prior to admission?   yes     Precautions: Restrictions/Precautions: General Precautions, Fall Risk, Weight Bearing  Other position/activity restrictions: tracheostomy/collar with moist air, 1-2 L at baseline  Right Lower Extremity Weight Bearing: Weight Bearing As Tolerated       Isolation Precautions: None                  Physiatrist: Dr. Raisa Nguyen     Patients Occupation: Retired     Reviewed Lab and Diagnostic reports from Current Admission: Yes     Patients Prior Functional  Level: Prior Function  ADL Assistance: Needs assistance  Homemaking Assistance: Needs assistance  Ambulation Assistance: Independent  Transfer Assistance: Independent  Additional Comments: pt amb short distances in the home with RW,  able to assist as needed. Current functional status for upper extremity ADLs: Moderate assistance     Current functional status for lower extremity ADLs: Dependent      Current functional status for bed, chair, wheelchair transfers:   Sit to Stand: Moderate Assistance, X 2, with Shirlie Quirk, cues for hand placement. From [facility]   Stand to Sit: Minimal Assistance, X 2, with Shirlie Quirk. To EOB     Current functional status for toilet transfers: Moderate assistance x2 Shirlie Quirk for toilet transfers     Current functional status for locomotion: N/T     Current functional status for bladder management: Maximal Assistance     Current functional status for bowel management:Moderate assistance     Current functional status for comprehension: Moderate assistance     Current functional status for expression: Moderate assistance     Current functional status for social interaction: Moderate assistance     Current functional status for problem solving: Moderate assistance     Current functional status for memory: Moderate assistance     Expected level of Improvement in Self-Care: Moderate assistance     Expected level of Improvement in Sphincter Control: Moderate assistance     Expected level of Improvement in Transfers:  Moderate assistance     Expected level of Improvement in Locomotion:  Moderate assistance     Expected level of Improvement in Communication and Social Cognition: Moderate assistance     Expected length of time to achieve that level of improvement: 2 weeks     Current rehab issues: ADL dysfunction,bladder management, bowel management,carry over of therapy techniques, discharge planning, disease and co-morbidity management, gait/mobility dysfunction, medication management, nutrition and hydration management, Ongoing assessment of safety, Pain management, Patient and family education, Prevention of secondary complications, Skin Integrity,  cognitive impairment, communication impairment. Required therapy: Physical Therapy, Occupational Therapy and Speech Therapy 3 hours per day, 5-6 days per week. Recreational Therapy 1 hour per week. Expected Discharge Destination: Home     Expected Post Discharge Treatments: Home Care     Other information relevant to the care needs:   Lives With: Spouse  Type of Home: House  Home Layout: One level  Home Access: Ramped entrance  Bathroom Shower/Tub: Walk-in shower  Bathroom Toilet: Standard  Bathroom Equipment: Built-in shower seat  Home Equipment: emil Moses (BSC)  Has the patient had two or more falls in the past year or any fall with injury in the past year?: Yes  ADL Assistance: Needs assistance  Homemaking Assistance: Needs assistance  Ambulation Assistance: Independent  Transfer Assistance: Independent  Active : No  Patient's  Info: Spouse provides transportation services  Additional Comments: pt amb short distances in the home with RW,  able to assist as needed. Acute Inpatient Rehabilitation Disclosure Statement provided to patient. Patient verbalized understanding. Patient requires intensive PT/OT/ST along with 24 hour nursing care and close physician supervision to manage patient multiple medical complexities. Patient plans to return to home with family. I have reviewed and concur with the findings and results of the pre-admission screening assessment completed by the Inpatient Rehabilitation Admissions Coordinator.      Peng Casarez MD                 Revision History  Date/Time User Provider Type Action   2/10/2023  9:06 AM Jian Tello MD Physician Sign   2/9/2023  3:52 PM Ac Rodriguez RN Registered Nurse Sign   2/9/2023  3:46 PM Ana Schmitz, RN Registered Nurse Sign    View Details Report

## 2023-02-13 NOTE — PROGRESS NOTES
[address] [facility] THERAPY  [facility]  DAILY NOTE    TIME   SLP Individual Minutes  Time In: 1100  Time Out: 1130  Minutes: 30  Timed Code Treatment Minutes: 30 Minutes       Date: 2023  Patient Name: Daisy Robles      CSN: [id number]   : 1950  (81 y.o.)  Gender: female   Referring Physician:  Hoang Abarca MD  Diagnosis: Closed intertrochanteric fracture, right,  initial encounter  Precautions: Fall risk  Current Diet: Regular and Thin Liquids   Swallowing Strategies: Standard Universal Swallow Precautions  Date of Last MBS/FEES: Not Applicable    Pain:  10/01 - Pain location: Back and R Hip - RN aware    Subjective:  Patient sitting in recliner with PT upon ST arrival. Patient agreeable to skilled ST services. ST provided set-up assistance for PMV placement at the beginning of session and following completion of session this date. Patient pleasant, cooperative, and engaged throughout. Short-Term Goals:  SHORT TERM GOAL #1:  Goal 1: Patient will complete functional problem solving and reasoning tasks with 70% accuracy and moderate cuing in order to improve completion of ADLs/IADLs at discharge. INTERVENTIONS:  Medication Management - Pill Box Review  5/5 independently. *excellent success with task   *patient endorses  was managing medications given frequent hospitalization; however, patient reporting she would like to work towards independent management of medications during IPR stay.      Money Management - Counting Bills/Coins   5/6 independently, 1/6 given min cues    *good success with task   *good utilization of self-talk to assist with mental manipulation   *patient endorses utilizing mostly cash    SHORT TERM GOAL #2:  Goal 2: Patient will complete functional immediate, working, and delayed recall tasks of 4+ units of information, with or without use of trained recall strategies, with 70% accuracy given mod cues to improve retention of pertinent medical information. INTERVENTIONS:  Immediate/Delayed Recall   Delayed Recall of WRAP (48 hours): 4/4 total assist despite written visual on white board    ST completed review of STM strategies using the acronym WRAP--Write it down, Repeat it, Associate it, and Pictures it. ST then provided patient with 5 new units of information r/t ST (Morenojessie Rondon, Robertotu Retriever, [place] in May, [place]). Patient utilized write it down, repeat it, and associate it. List remaining in room on white board. Immediate recall: 4/6 independently, 2/6 given min cues   Delayed recall (10  minutes): 6/6 independently   Recall of WRAP Strategies Utilized: 1/3 independently, 1/3 given mod cues, 1/3 total assist     SHORT TERM GOAL #3:  Goal 3: Patient will complete functional thought organization and sequencing exercises with 80% accuracy and moderate cuing  in order to improve  completion  of  ADLs/IADLs. INTERVENTIONS: Did not address this session d/t focus on other goals. SHORT TERM GOAL #4:  Goal 4: Patient will complete sustained, selective, alternating, and divided attention tasks with no more than three  errors/redirections in five minutes/one task in order to allow for safe return to driving at discharge. INTERVENTIONS: Did not address this session d/t focus on other goals. Long-Term Goals:  Time Frame for Long Term Goals: 3 weeks    LONG TERM GOAL #1:  Goal 1: Patient will improve cognitive  functioning  to a level of modified independent in order to allow for safe return to PLOF.          Comprehension: 5 - Patient understands basic needs (hungry/hot/pain)  Expression: 5 - Expresses basic ideas/needs only (hungry/hot/pain)  Social Interaction: 5 - Patient is appropriate with supervision/cues  Problem Solving: 3 - Patient solves simple/routine tasks 50%-74%  Memory: 2 - Patient remembers 25%-49% of the time    EDUCATION:  Learner: Patient  Education:  Reviewed ST goals and Plan of Care and Education Related to [employer] and Wellness  Evaluation of Education: Benjaminrima Bruner understanding, Demonstrates with assistance, and Needs further instruction    ASSESSMENT/PLAN:  Activity Tolerance:  Patient tolerance of  treatment: good. Assessment/Plan: Patient progressing toward established goals. Continues to require skilled care of licensed speech pathologist to progress toward achievement of established goals and plan of care. .     Plan for Next Session: Thought Organization, Attention, Delayed Recall   Discharge Recommendations: Continue to Assess Pending Progress     [facility]) [address]

## 2023-02-13 NOTE — PROGRESS NOTES
Comprehensive Nutrition Assessment    Type and Reason for Visit:  Initial, Consult (Nutritional assessment)    Nutrition Recommendations/Plan:   Will send Glucerna TID. Recommend continue MVI. Encouraged po, good nutrition at best efforts for healing. Malnutrition Assessment:  Malnutrition Status: At risk for malnutrition (Comment) (02/13/23 1215)    Context:  Acute Illness     Findings of the 6 clinical characteristics of malnutrition:  Energy Intake:  Mild decrease in energy intake (Comment)  Weight Loss:  Unable to assess (difficult to evaluate w/ edema)     Body Fat Loss:  No significant body fat loss     Muscle Mass Loss:  No significant muscle mass loss    Fluid Accumulation:  Unable to assess     Strength:  Not Performed    Nutrition Assessment:    Pt. nutritionally compromised AEB wounds, inadequate oral intake  At risk for further nutrition compromise r/t increased nutrient needs for wound healing, underlying medical condition (hx CAD, COVID, DM, DVT, HLD HTN, PE, trach 4/22). Nutrition Related Findings:      Wound Type: Deep Tissue Injury, Surgical Incision (2/3 Open treatment right intertrochanteric femur fracture the cephalomedullary nail)     Pt. Report/Treatments/Miscellaneous: pt. Seen - reports poor appetite and intake; pt. Ate a few bites of omelet and doesn't want any more; sipping on milk; disliked [employer]; agrees to Glucerna; SLP following  GI Status: 3 BMs noted past 24 hours  Pertinent Labs: 2/11: Glucose 75, BUN 13, Cr 0.6  Pertinent Meds: Iron, MVI, Lasix, Colace, Lactulose      Current Nutrition Intake & Therapies:    Average Meal Intake: 1-25%, 26-50%, %     ADULT DIET; Regular; 4 carb choices (60 gm/meal);  Low Sodium (2 gm); 2000 ml  ADULT ORAL NUTRITION SUPPLEMENT; Breakfast, Dinner, Lunch; Diabetic Oral Supplement    Anthropometric Measures:  Height: 5' 2\" (157.5 cm)  Ideal Body Weight (IBW): 110 lbs (50 kg)    Admission Body Weight: 225 lb 1.6 oz (102.1 kg) (2/10 trace, +1 edema)  Current Body Weight: 230 lb 6.1 oz (104.5 kg) (2/13 trace, +2 edema),       Current BMI (kg/m2): 42.1  Usual Body Weight:  (per EMR: 4/13/22: 192# 2 oz, 1/21/23: 174# 14 oz, 1/26/23: 210# 12 oz)                       BMI Categories: Obese Class 3 (BMI 40.0 or greater)    Estimated Daily Nutrient Needs:  Energy Requirements Based On: Kcal/kg  Weight Used for Energy Requirements: Other (Comment) (105)  Energy (kcal/day): 6047-2263 kcals (12-15)  Weight Used for Protein Requirements: Ideal (50)  Protein (g/day):  grams (1.5-2)       Nutrition Diagnosis:   Increased nutrient needs related to increase demand for energy/nutrients as evidenced by wounds    Nutrition Interventions:   Food and/or Nutrient Delivery: Continue Current Diet, Start Oral Nutrition Supplement  Nutrition Education/Counseling: Education initiated (2/13 Encouraged po, ONS intake at best efforts for healing.)  Coordination of Nutrition Care: Continue to monitor while inpatient       Goals:     Goals: PO intake 75% or greater, by next RD assessment       Nutrition Monitoring and Evaluation:      Food/Nutrient Intake Outcomes: Diet Advancement/Tolerance, Food and Nutrient Intake, Supplement Intake  Physical Signs/Symptoms Outcomes: Biochemical Data, Chewing or Swallowing, GI Status, Fluid Status or Edema, Nutrition Focused Physical Findings, Skin, Weight    Discharge Planning:     Too soon to determine     Jese Yates RD, LD  Contact: [phone]

## 2023-02-13 NOTE — PLAN OF CARE
Problem: Discharge Planning  Goal: Discharge to home or other facility with appropriate resources  2023 1044 by FAITH Gamino  Note:   [facility]  Physical Medicine Case Management Assessment    [x] Inpatient Rehabilitation Unit    Patient Name: Clint Johnson        MRN: [id number]    : 1950  (81 y.o.)  Gender: female   Date of Admission: 2/10/2023  2:59 PM    Family/Social/Home Environment:   Prior to admission, patient was living with her , Maximino Morales. Patient was independent with ambulating, transfers and some of her ADLs. Maximino Morales reports that he was assisting patient in some of her bathing and dressing. Patient was using a walker at home to ambulate. Maximino Morales reports that patient has had a decline over the past year. Patient has been needing more assistance with her daily care. Maximino Morales is hoping for patient to be able to ambulate, transfer and toilet herself at [facility] independent. Maximino Morales is retired and able to provide care to patient. Patient's support includes Maximino Morales and her daughter, Richie Friedman, who lives close by. Patient's family physician is Loli Montes MD. Patient prefers [address]. Patient is motivated to participate in therapy.     Social/Functional History  Lives With: Spouse  Type of Home: House  Home Layout: One level  Home Access: Ramped entrance (or 1 step with no rails.)  Bathroom Shower/Tub: Walk-in shower  Bathroom Toilet: Standard  Bathroom Equipment: Built-in shower seat, 3-in-1 commode  Home Equipment: Arliss Custard, rolling, Lift chair (Pt sleeps in lift chair and was using it to assist up to stand PTA)  Has the patient had two or more falls in the past year or any fall with injury in the past year?: Yes  ADL Assistance: Needs assistance  Bath: Stand by assistance  Dressing: Stand by assistance  Toileting: Independent  Homemaking Assistance: Needs assistance  Ambulation Assistance: Independent  Transfer Assistance: Independent  Active : No  Patient's  Info: Spouse provides transportation services  Additional Comments: pt amb short distances in the home with RW,  available 24/7 and able to assist as needed. Has a passi valve that she uses at rest.    Contact/Guardian Information: Gregory Damian (spouse) [phone], Jakub Jennings (daughter) [address] [place]: Patient was not using community resources prior to admission. Sexuality/Intimacy: Patient did not disclose sexuality/intimacy concerns during SW assessment. Complementary Health Approaches: Patient did not disclose desires towards complementary health approaches during SW assessment. Anticipated Needs/Discharge Plans: SW will follow and maintain involvement in discharge planning. SW met with patient on this date to introduce self, complete SW assessment and initiate discharge planning. SW spoke with patient's spouse, Anahi Isaacs, over the phone to introduce self. Prior to admission, patient was living with her , Anahi Isaacs. Patient was independent with ambulating, transfers and some of her ADLs. Anahi Isaacs reports that he was assisting patient in some of her bathing and dressing. Patient was using a walker at home to ambulate. Anahi Isaacs reports that patient has had a decline over the past year. Patient has been needing more assistance with her daily care. Anahi Isaacs is hoping for patient to be able to ambulate, transfer and toilet herself at [facility] independent. Anahi Isaacs is retired and able to provide care to patient. Patient's support includes Anahi Isaacs and her daughter, Alexandra Ortiz, who lives close by. Patient's family physician is Lee Finley MD. Patient prefers [address] Patient is motivated to participate in therapy. SW educated patient on Wednesday, 2/15 care conference. SW will follow and maintain involvement in discharge planning.        Read-Only, Retired: Discharge Planning  Living Arrangements: Spouse/Significant Other  [place]: Spouse/Significant Other  Current DME Prior to Arrival: Donnice Lightning, Wheelchair  Potential Assistance Needed: Outpatient PT/OT  Meds-to-Beds: Does the patient want to have any new prescriptions delivered to bedside prior to discharge?: Yes  Type of Home Care Services: OT, PT  Patient expects to be discharged to[de-identified] House  Expected Discharge Date:  (Undetermined)      FAITH Wong 2/13/2023 10:44 AM

## 2023-02-13 NOTE — PLAN OF CARE
Problem: Chronic Conditions and Co-morbidities  Goal: Patient's chronic conditions and co-morbidity symptoms are monitored and maintained or improved  2/13/2023 1016 by Anastasia Piña RN  Outcome: Progressing  2/13/2023 1015 by Anastasia Piña RN  Outcome: Progressing  Flowsheets (Taken 2/13/2023 0805)  Care Plan - Patient's Chronic Conditions and Co-Morbidity Symptoms are Monitored and Maintained or Improved: Monitor and assess patient's chronic conditions and comorbid symptoms for stability, deterioration, or improvement     Problem: Discharge Planning  Goal: Discharge to home or other facility with appropriate resources  2/13/2023 1016 by Anastasia Piña RN  Outcome: Progressing  2/13/2023 1015 by Anastasia Piña RN  Outcome: Progressing  Flowsheets (Taken 2/13/2023 0805)  Discharge to home or other facility with appropriate resources: Identify barriers to discharge with patient and caregiver     Problem: Skin/Tissue Integrity  Goal: Absence of new skin breakdown  Description: 1. Monitor for areas of redness and/or skin breakdown  2. Assess vascular access sites hourly  3. Every 4-6 hours minimum:  Change oxygen saturation probe site  4. Every 4-6 hours:  If on nasal continuous positive airway pressure, respiratory therapy assess nares and determine need for appliance change or resting period.   2/13/2023 1016 by Anastasia Piña RN  Outcome: Progressing  2/13/2023 1015 by Anastasia Piña RN  Outcome: Progressing     Problem: ABCDS Injury Assessment  Goal: Absence of physical injury  2/13/2023 1016 by Anastasia Piña RN  Outcome: Progressing  2/13/2023 1015 by Anastasia Piña RN  Outcome: Progressing  Flowsheets (Taken 2/13/2023 1014)  Absence of Physical Injury: Implement safety measures based on patient assessment     Problem: Safety - Adult  Goal: Free from fall injury  2/13/2023 1016 by Anastasia Piña RN  Outcome: Progressing  2/13/2023 1015 by Anastasia Piña RN  Outcome: Progressing  Flowsheets (Taken 2/13/2023 1014)  Free From Fall Injury: Instruct family/caregiver on patient safety     Problem: Pain  Goal: Verbalizes/displays adequate comfort level or baseline comfort level  2/13/2023 1016 by Nacho Lewis RN  Outcome: Progressing  2/13/2023 1015 by Nacho Lewis RN  Outcome: Progressing

## 2023-02-13 NOTE — PROGRESS NOTES
Physical Medicine & Rehabilitation Progress Note    Chief Complaint:  Right hip fracture, low back compression fracture    Subjective:    Arnold Price is a 81 y.o. 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 The patient previously was admitted to inpatient rehab service from 2022 to 2022 due to critical care myopathy and debility/physical decondition as result of COVID-19 pneumonia. She was discharged to home with referral for home care service on 2022. The patient developed progressive difficulty breathing in 2022 and was found to have glottic stenosis and eventually received tracheostomy by Dr. Laverne Christianson on 2022. The patient's  says the patient also developed progressively worsening lower back pain in summer 2022 and saw orthopedics surgeon at [facility]. The patient has been says the patient was diagnosed of having \"lumbar spine stress fracture\". No surgical intervention was performed. She was treated with brace which she later abandoned due to discomfort associate with brace usage.      The patient was recently hospitalized from 2023 to 2023 initially for abdominal bloating, nausea and leg swelling. She was found to have elevated D-dimer. Subsequent testing revealed right lower lobe pulmonary embolism and right lower extremity DVT. She was at initially treated with IV heparin and later transition to [address] [place] [address]. The patient was brought to ER on 2/1/2022 because of progressively increased leg swelling, and nausea/vomiting associated with decreased oral intake. She was found to have BNP of 256.7 and Bumex was prescribed. She then had a fall accident  when she was going to toilet while she was in ER on 2/1/2022. The fall resulted severe right hip pain. X-ray of right hip and pelvis revealed acute right femoral intertrochanteric fracture with subtrochanteric extension. Orthopedic service was consulted. Xarelto was held in preparation for surgical management. Cardiology was consulted on 2/2/2023 for elevated BNP and Lasix was started. Because of difficulty voiding with urinary retention, urology was also consulted on 2/2/2023. Urology service placed Wen with some difficulty on 2/2/2023. Therefore Wen was kept in place. On 2/3/2023 the patient underwent open treatment of right intertrochanteric fracture with cephalomedullary nail by Dr. Luis Fernando Neal. She is allowed weightbearing as tolerated at the right lower extremity postoperatively. PM&R was consulted on 2/5/2023. During the evaluation the patient's  said that the patient began having progressively worsened low back pain starting in summer 2022 began interfering the patient's daily function. Therefore she is sore orthopedic physician at [facility]. Imaging tests were done and the patient was told that she had \" stress fracture\" in her spine. She was provided with a lumbar spine brace to wear but she was not compliant with spine brace application because of discomfort it produced while she was wearing it.   Because no outside lumbar spine images study report was available for review, x-ray of lumbar spine was ordered and performed on 2/6/2023 and showed moderate vertebral body spondylosis in the lumbar and lower thoracic spine, moderate degenerative facet arthropathy involving at least lower 3 lumbar levels, lumbar spine osteoporosis, mild superior endplate depression fracture at L2, L3 and L4. Lumbar spine MRI was recommended by radiologist.  Therefore primary team ordered lumbar spine MRI which was performed this afternoon revealing acute fracture with a fluid cleft associated with endplate at A0-L4 levels with mild height loss at each vertebral bodies. The patient was found to have severe anemia with Hgb/Hct dropped from 7.3/24 on 2/4/2023 down to 5.1/16.9 on 2/6/2023. Therefore she was given blood transfusion for few units. CT of abdomen and pelvis was ordered to rule out possible intra-abdominal source of blood loss. CT abdomen and pelvis done on 2/8/2023 show only pericolonic inflammation with diverticula related to acute diverticulitis, acute on chronic compression fracture of L2, L3, L4 and L5, mild, and marked osteopenia. The patient's hospital course also complicated by intermittent confusion, and development of gluteus region skin breakdown. The patient says she did not have a good sleep last night because she had frequent urinary bladder spasm pain. The nurse also reported that the patient has low urine output (1200 mL on 2/12/2023 and 325 mL on 2/13/2023). Nurse also reports some leakage around the Wen catheter. The Wen catheter was deferred putting on 2/2/2023 with some difficulty. Urology service was seen the patient on 2/9/2023. She says she continues to have pain at her hips more on the right than the left. She also has some pain at right thigh. She rated her pain intensity at 8-10/10 level. She took oxycodone 5 mg 3 times yesterday. Her right lower extremity remains weak and feels heavy. She also complains of mild stomach discomfort with no appetite.   She tolerated the intensive inpatient rehabilitative treatment yesterday. Rehabilitation:  PT: Reviewed. Transfers:  Sit to Stand: Maximum Assistance, X 1, with Fatemeh Stai from [facility]  Stand to [address] [place] [address], X 1, with Fatemeh Stai  To/From Bed and Chair: Dependent, X 1, with Fatemeh Stai    Balance:  Static Standing Balance: Contact Guard Assistance, standing inside jose-stedy while therapist completed flaquito-care, clothing management. Fatigued quickly and required rest break on flaps on jose-stedy throughout. Increased verbal cueing for erect posture and not leaning on forearms when sitting on flaps on jose-stedy and with standing. Wheelchair Mobility:  Stand By Assistance, Minimal Assistance, X 1, with verbal cues   Extremities Used: Bilateral Upper Extremities  Type of Chair:Manual  Surface: Level Tile  Distance: ~60 ft. X1   Quality: Slow Velocity, Short Strokes, Veers Right, and Decreased Fluidity       OT: Reviewed. ADL:   Grooming: with set-up. Combed hair sitting in bedside chair  Upper Extremity Dressing: with set-up. Hospital gown  Lower Extremity Dressing: Dependent and X 2. For donning depends and shorts  Footwear Management: Dependent. For donning B slipper socks  Toileting: Dependent and X 2. Incontinent of bowel and werner leaking . BALANCE:  Sitting Balance:  Contact Guard Assistance. At EOB x 5 minutes with B UE support     BED MOBILITY:  Rolling to Left: Moderate Assistance HOB elevated, used bedrail  Rolling to Right: Moderate Assistance    Supine to Sit: Maximum Assistance HOb elevated, used bedrail     TRANSFERS:  Sit to Stand: Moderate Assistance. EOB using Fatemeh Stai  Stand to Sit: Moderate Assistance.  Bedside chair from [address] [place] [address] L:  Patient completed BUE strengthening exercises with skilled education on HEP: completed x10 reps x1 set with a orange band in all joints and all planes in order to improve UE strength and activity tolerance required for BADL routine and toilet / shower transfers. Patient tolerated well, requiring  minima rest breaks. Patient also required minimal cues for technique.  updated on current progress and abilities voiced understanding and appreciation      ST: Reviewed. Medication Management - Pill Box Review  5/5 independently. *excellent success with task   *patient endorses  was managing medications given frequent hospitalization; however, patient reporting she would like to work towards independent management of medications during IPR stay. Money Management - Counting Bills/Coins   5/6 independently, 1/6 given min cues    *good success with task   *good utilization of self-talk to assist with mental manipulation   *patient endorses utilizing mostly cash    Immediate/Delayed Recall   Delayed Recall of WRAP (48 hours): 4/4 total assist despite written visual on white board     ST completed review of STM strategies using the acronym WRAP--Write it down, Repeat it, Associate it, and Pictures it. ST then provided patient with 5 new units of information r/t ST (Elene Goodell, [employer], [place] in [place], [place]). Patient utilized write it down, repeat it, and associate it. List remaining in room on white board. Immediate recall: 4/6 independently, 2/6 given min cues   Delayed recall (10  minutes): 6/6 independently   Recall of WRAP Strategies Utilized: 1/3 independently, 1/3 given mod cues, 1/3 total assist       Review of Systems:  Review of Systems   Constitutional:  Positive for fatigue. Negative for chills, diaphoresis and fever. HENT:  Negative for hearing loss, rhinorrhea, sneezing, sore throat and trouble swallowing. Eyes:  Negative for visual disturbance. Respiratory:  Negative for cough, shortness of breath and wheezing. Cardiovascular:  Negative for chest pain and palpitations.    Gastrointestinal:  Negative for abdominal pain, constipation, diarrhea, nausea and vomiting. Genitourinary:  Negative for dysuria. Musculoskeletal:  Positive for arthralgias (Bilateral hip, right groin, and right thigh), back pain (Lower back), gait problem and myalgias (Right thigh). Negative for neck pain. Skin:  Negative for rash. Neurological:  Positive for weakness (Bilateral lower extremities especially on the right side) and numbness (Bilateral feet). Negative for dizziness, tremors, seizures, speech difficulty, light-headedness and headaches. Psychiatric/Behavioral:  Positive for sleep disturbance. Negative for dysphoric mood and hallucinations. The patient is not nervous/anxious.          Objective:  BP (!) 141/91   Pulse 96   Temp 99.1 °F (37.3 °C) (Oral)   Resp 16   Ht 5' 2\" (1.575 m)   Wt 230 lb 6.1 oz (104.5 kg)   LMP  (LMP Unknown)   SpO2 100%   BMI 42.14 kg/m²   Physical Exam   General:  well-developed, well nourished morbid obese  female ; in no acute distress ; appropriate affect & mood; sitting on reclining chair comfortably; receiving humidified oxygen supplement via trach mask; speaking in whispered manner due to presence of tracheostomy  Eyes: pupil equally round ; extra-ocular motion intact bilaterally  Head, Ear, Nose, Mouth & Throat : normocephalic ; no discharge from ears or nose ; no deformity ; no facial swelling ; oral mucosa pink  Neck :  supple ; presence of tracheostomy at anterior neck; no tenderness; mild muscle spasm at cervical paraspinal muscle and upper trapezius muscle  Cardiovascular : regular rate & rhythm ; normal S1 & S2 heart sound ; no murmur ; normal peripheral pulse at bilateral upper & lower extremities  Pulmonary : Breath sounds present at bilateral lung fields; no wheezing ; no rale; no crackle  Gastrointestinal : soft, protruded abdomen; mild tenderness at epigastric area; normal bowel sound present    : Wen catheter in place draining dark yellowish urine  Skin: no skin lesion or rash ; presence of adhesive dressing at right lateral upper and lower thigh; no pitting edema at bilateral upper extremities; 1+ pitting edema at bilateral ankles; presence of PICC line on right arm near elbow area  Musculoskeletal : no limb asymmetry; no limb deformity; tenderness at right lateral and anterior thigh, right lateral hip and right groin area; no tenderness at bilateral upper extremities & the rest of bilateral lower extremities; no palpable mass at limbs ; right hip and right knee joint laxity not assessed; no joints laxity or crepitation at other limb joints; shoulder flexion and abduction passive ROM reaching 160 degrees bilaterally; right hip flexion passive ROM reaching 25 degrees and right knee flexion passive ROM reaching 20 degrees limited by pain at right hip and thigh; left hip flexion passive ROM reaching 90 degrees; ankle dorsiflexion passive ROM reaching 0 degrees bilaterally; otherwise normal functional joints ROM at the rest of bilateral upper & lower extremities  Cerebral :  alert ; awake ; oriented to place, person and time; follow 1-2 steps verbal command  Cerebellum : no dysmetria with bilateral finger-to-nose test ; unable to perform heel-to-shin test on the right side; dysmetria with left heel-to-shin test  Cranial nerve : CN II to XII function grossly intact  Sensory : intact light touch and pin prick sensation at bilateral upper extremities; reduced light touch and pinprick sensation at distal bilateral lower extremities from upper leg region downward in sock distribution  Motor : normal tone at bilateral upper & left lower extremities ; muscle tone not assessed on right lower extremity due to the pain; 2-/5 muscle strength at right hip flexion, abduction and adduction; 3-/5 muscle strength at the left hip flexion; 3-/5 muscle strength at right knee extension ; 2+/5 muscle strength at the right knee flexion; 4+/5 muscle strength at the left knee flexion ; otherwise 5/5 muscle strength at the rest of bilateral upper and the lower extremities  Reflex : 1+ bilateral brachioradialis reflexes; 0 bilateral biceps and bilateral knees reflexes   Pathological Reflex :  No Roberta's sign ; no ankle clonus  Gait : Not assessed      Diagnostics:   Recent Results (from the past 24 hour(s))   POCT glucose    Collection Time: 02/13/23 10:51 PM   Result Value Ref Range    POC Glucose 133 (H) 70 - 108 mg/dl   Basic Metabolic Panel    Collection Time: 02/14/23  5:40 AM   Result Value Ref Range    Sodium 140 135 - 145 meq/L    Potassium 3.5 3.5 - 5.2 meq/L    Chloride 104 98 - 111 meq/L    CO2 25 23 - 33 meq/L    Glucose 114 (H) 70 - 108 mg/dL    BUN 10 7 - 22 mg/dL    Creatinine 0.5 0.4 - 1.2 mg/dL    Calcium 8.7 8.5 - 10.5 mg/dL   CBC    Collection Time: 02/14/23  5:40 AM   Result Value Ref Range    WBC 6.7 4.8 - 10.8 thou/mm3    RBC 2.83 (L) 4.20 - 5.40 mill/mm3    Hemoglobin 8.2 (L) 12.0 - 16.0 gm/dl    Hematocrit 26.3 (L) 37.0 - 47.0 %    MCV 92.9 81.0 - 99.0 fL    MCH 29.0 26.0 - 33.0 pg    MCHC 31.2 (L) 32.2 - 35.5 gm/dl    RDW-CV 16.7 (H) 11.5 - 14.5 %    RDW-SD 56.0 (H) 35.0 - 45.0 fL    Platelets 459 378 - 824 thou/mm3    MPV 9.4 9.4 - 12.4 fL   Anion Gap    Collection Time: 02/14/23  5:40 AM   Result Value Ref Range    Anion Gap 11.0 8.0 - 16.0 meq/L   Glomerular Filtration Rate, Estimated    Collection Time: 02/14/23  5:40 AM   Result Value Ref Range    Est, Glom Filt Rate >60 >60 ml/min/1.73m2   POCT glucose    Collection Time: 02/14/23  7:47 AM   Result Value Ref Range    POC Glucose 147 (H) 70 - 108 mg/dl        Latest Reference Range & Units 2/10/23 04:47 2/11/23 06:00 2/14/23 05:40   Sodium 135 - 145 meq/L 140 136 140   Potassium 3.5 - 5.2 meq/L 3.7 3.9 3.5   Chloride 98 - 111 meq/L 104 100 104   CO2 23 - 33 meq/L 28 26 25   BUN,BUNPL 7 - 22 mg/dL 12 13 10   Creatinine 0.4 - 1.2 mg/dL 0.5 0.6 0.5   Anion Gap 8.0 - 16.0 meq/L 8.0 10.0 11.0   Est, Glom Filt Rate >60 ml/min/1.73m2 >60 >60 >60   Glucose, Random 70 - 108 mg/dL 89 75 114 (H)   CALCIUM, SERUM, [id number] 8.5 - 10.5 mg/dL 8.0 (L) 8.8 8.7   (H): Data is abnormally high  (L): Data is abnormally low       Latest Reference Range & Units 2/10/23 04:47 2/11/23 06:00 2/14/23 05:40   WBC 4.8 - 10.8 thou/mm3 8.5 6.8 6.7   RBC 4.20 - 5.40 mill/mm3 2.85 (L) 2.98 (L) 2.83 (L)   Hemoglobin Quant 12.0 - 16.0 gm/dl 8.6 (L) 8.7 (L) 8.2 (L)   Hematocrit 37.0 - 47.0 % 27.2 (L) 28.3 (L) 26.3 (L)   MCV 81.0 - 99.0 fL 95.4 95.0 92.9   MCH 26.0 - 33.0 pg 30.2 29.2 29.0   MCHC 32.2 - 35.5 gm/dl 31.6 (L) 30.7 (L) 31.2 (L)   MPV 9.4 - 12.4 fL 9.3 (L) 9.7 9.4   RDW-CV 11.5 - 14.5 % 17.9 (H) 17.2 (H) 16.7 (H)   RDW-SD 35.0 - 45.0 fL 62.6 (H) 59.7 (H) 56.0 (H)   Platelet Count 792 - 400 thou/mm3 203 227 278   Lymphocytes Absolute 1.0 - 4.8 thou/mm3 1.1 1.6    Monocytes Absolute 0.4 - 1.3 thou/mm3 0.9 0.8    Eosinophils Absolute 0.0 - 0.4 thou/mm3 0.1 0.1    Basophils Absolute 0.0 - 0.1 thou/mm3 0.0 0.0    Seg Neutrophils % 72.6 59.6    Segs Absolute 1.8 - 7.7 thou/mm3 6.2 4.1    Lymphocytes % 13.5 23.8    Monocytes % 10.2 11.4    Eosinophils % 0.8 1.5    Basophils % 0.4 0.6    Immature Grans (Abs) 0.00 - 0.07 thou/mm3 0.21 (H) 0.21 (H)    Immature Granulocytes % 2.5 3.1    (L): Data is abnormally low  (H): Data is abnormally high      Impression:  Right femoral intertrochanteric fracture with subtrochanteric extension due to fall requiring open reduction and internal fixation with cephalomedullary nail  Glottic stenosis requiring tracheostomy  Persistent chronic low back pain due to lumbar and lower thoracic spine spondylosis with degenerative facet arthropathy at the lower 3 lumbar levels, and acute L2-S1 endplate fractures  Right posterior tibial, left greater saphenous, left peroneal and left anterior tibial veins deep vein thrombosis, and right lower lobe pulmonary embolism  Acute anemia requiring blood transfusion  Urinary retention due to urethra obstruction requiring Wen cath placement ; now with leakage around the Werner catheter possible due to Werner catheter malfunction  Lumbar spine osteoporosis  Gluteal region decubitus ulcer  Diabetes mellitus type 2 with poor blood glucose control and complicated by bilateral lower extremities diabetic polyneuropathy  Morbid obesity  Hypertension  Hyperlipidemia  History of lower back pain with history of \"lumbar stress fracture\"  History of COVID infection with pneumonia  History of mild cognitive impairment  History of coronary artery disease requiring coronary artery stenting  Grade 1 left ventricular diastolic dysfunction with preserved ejection fraction     The patient had urinary bladder spasm pain last night. There is some leakage around the werner cath and the Werner cath is not draining much urine. The patient does not appear to be dehydrated. I instructed nurse to try  irrigate Werner catheter. Urology service is contacted and will come to see the patient today. She also has some tenderness at the epigastric area with poor appetite. I will start patient on Protonix daily and add simethicone as needed. She continues having pain at hips especially on the right side. She also still has significant weakness at her right lower extremity. She tolerated the intensive inpatient rehabilitation treatment yesterday. Plan:  Continues intensive PT/OT/SLP/RT inpatient rehabilitation program at least 3 hours per day, 5 days per week in order to improve functional status prior to discharge. Family education and training will be completed. Equipment evaluations and recommendations will be completed as appropriate. Rehabilitation nursing continues to be involved for bowel, bladder, skin, and pain management. Nursing will also provide education and training to patient and family. Prophylaxis:  DVT: Patient on Xarelto, KENA stocking, intermittent pneumatic compression device.   GI: Colace, Enemeez enema, GlycoLax, Senokot, lactulose as needed, milk of magnesium as needed; add Movantik  Pain: Tylenol, oxycodone as needed, lidocaine patch; add Voltaren gel as needed  Continue aspirin for coronary artery disease  Continue Lasix for lower extremities edema  Continue Lantus insulin, Humalog insulin, Humalog insulin coverage for diabetes mellitus  Continue Seroquel for anxiety and insomnia   Continue Xarelto for bilateral DVT and pulmonary embolism  Continue Toprol-XL for hypertension  Continue Crestor for hyperlipidemia  Continue melatonin, trazodone as needed for insomnia; increase trazodone dosage to 100 mg  Start Protonix for gastric protection  Add simethicone as needed for indigestion  Continue multivitamin and ferrous sulfate for anemia  Waiting for patient to be reevaluated by urology service in regarding bladder spasm and Wen malfunction  Nutrition: Continue current diet  Bladder: Monitoring signs or symptoms of UTI  Bowel: Monitoring signs or symptoms of constipation   and case management for coordination of care and discharge planning      Missed Therapy Time:  None      Peng Casarez MD

## 2023-02-13 NOTE — PROGRESS NOTES
[address]  Occupational Therapy  Daily Note  Time:   Time In: 0730  Time Out: 0830  Timed Code Treatment Minutes: 60 Minutes  Minutes: 60          Date: 2023  Patient Name: Princess Marks,   Gender: female      Room: [facility]/055-A  MRN: [id number]  : 1950  (81 y.o.)  Referring Practitioner: Rigoberto Dugan MD  Diagnosis: closed intertrochanteric fracture, right  Additional Pertinent Hx: The patient was recently hospitalized from 2023 to 2023 initially for abdominal bloating, nausea and leg swelling. She was found to have elevated D-dimer. Subsequent testing revealed right lower lobe pulmonary embolism and right lower extremity DVT. She was at initially treated with IV heparin and later transition to [address] [place] [address]. The patient was brought to ER on 2022 because of progressively increased leg swelling, and nausea/vomiting associated with decreased oral intake. She was found to have BNP of 256.7 and Bumex was prescribed. She then had a fall accident  when she was going to toilet while she was in ER on 2022. The fall resulted severe right hip pain. X-ray of right hip and pelvis revealed acute right femoral intertrochanteric fracture with subtrochanteric extension. Orthopedic service was consulted. Xarelto was held in preparation for surgical management. Cardiology was consulted on 2023 for elevated BNP and Lasix was started. Because of difficulty voiding with urinary retention, urology was also consulted on 2023. Urology service placed Wen with some difficulty on 2023. Therefore Wen was kept in place. On 2/3/2023 the patient underwent open treatment of right intertrochanteric fracture with cephalomedullary nail by Dr. Carina Antonio. She is allowed weightbearing as tolerated at the right lower extremity postoperatively.     Restrictions/Precautions:  Restrictions/Precautions: General Precautions, Fall Risk, Weight Bearing  Right Lower Extremity Weight Bearing: Weight Bearing As Tolerated  Position Activity Restriction  Other position/activity restrictions: tracheostomy/collar with moist air. With venturi mask for out of room use 8 liters portable O2. SUBJECTIVE: In bed upon arrival, agreeable to OT session, cooperative, dressing changed to R hip due to saturation    PAIN: 9/10: back    Vitals: Vitals not assessed per clinical judgement, see nursing flowsheet    COGNITION: WFL. Patient was asleep upon arrival, when woken patient asked if there was a bomb under her bed. Patient had reported that she had been watching a show before sleeping    ADL:   Grooming: with set-up. Combed hair sitting in bedside chair  Upper Extremity Dressing: with set-up. Hospital gown  Lower Extremity Dressing: Dependent and X 2. For donning depends and shorts  Footwear Management: Dependent. For donning B slipper socks  Toileting: Dependent and X 2. Incontinent of bowel and werner leaking . BALANCE:  Sitting Balance:  Contact Guard Assistance. At EOB x 5 minutes with B UE support    BED MOBILITY:  Rolling to Left: Moderate Assistance HOB elevated, used bedrail  Rolling to Right: Moderate Assistance    Supine to Sit: Maximum Assistance HOb elevated, used bedrail    TRANSFERS:  Sit to Stand: Moderate Assistance. EOB using [facility]  Stand to Sit: Moderate Assistance. Bedside chair from [address]:     Activity Tolerance:  Patient tolerance of  treatment: good. Discharge Recommendations: Continue to assess pending progress  Equipment Recommendations: Other: Monitor pending progress  Plan: Times Per Week: 5x per week for 90 minutes  Times Per Day:  Once a day  Current Treatment Recommendations: Balance training, Functional mobility training, Endurance training, Self-Care / ADL, Safety education & training, Patient/Caregiver education & training, Strengthening    Patient Education  Patient Education: ADL's and Importance of Increasing Activity    Goals  Short Term Goals  Time Frame for Short Term Goals: until discharge  Short Term Goal 1: Pt will tolerate 12-15 min EOB ADL task with S to improve trunk control and activity tolerance required for EOB ADLs. Short Term Goal 2: Pt will complete sit-stand t/fs with mod A x 1 with minimal cues of technique to progress towards BSC t/fs  Short Term Goal 3: Pt will tolerate standing 1 min with min A for increased ease of clothing management nad LB bathing routine  Short Term Goal 4: Pt will tolerate further assessment of functional t/fs by OTR when appropriate  Long Term Goals  Time Frame for Long Term Goals : until discharge  Long Term Goal 1: Pt will complete sit-stand t/fs with SBA x 1 with minimal cues of technique to progress towards BSC t/fs  Long Term Goal 2: Pt will perform UB/LB dressing and bathing with Min A and minimal cues for ECT to improve functional independence. Following session, patient left in safe position with all fall risk precautions in place.

## 2023-02-13 NOTE — PROGRESS NOTES
[address] NOTE    Conference Date: 2/15/2023  Admit Date:  2/10/2023  2:59 PM  Patient Name: Francisco Javier Lyle    MRN: [id number]    : 1950  (81 y.o.)  Rehabilitation Admitting Diagnosis:  Closed intertrochanteric fracture, right, initial encounter [facility]) [H59.862W]  Referring Practitioner: Edward Calderón MD      CASE MANAGEMENT  Current issues/needs regarding patient and family discharge status: Prior to admission, patient was living with her , Negro Colon. Patient was independent with ambulating, transfers and some of her ADLs. Negro Colon reports that he was assisting patient in some of her bathing and dressing. Patient was using a walker at home to ambulate. Negro Colon reports that patient has had a decline over the past year. Patient has been needing more assistance with her daily care. Negro Colon is hoping for patient to be able to ambulate, transfer and toilet herself at [facility] independent. Negro Colon is retired and able to provide care to patient. Patient's support includes Negro Colon and her daughter, [employer], who lives close by. Patient's family physician is Serena Altamirano MD. Patient prefers [address] Patient is motivated to participate in therapy. PHYSICAL THERAPY  Mrs Pastrana All met 0/4 STG's and 0/6 LTG's. Patient has not met any goals at this time, however with short length of stay on inpatient rehab since initial evaluation. Urban Casper is demonstrating improvements with sit < > stand, progressing from moderate assistance x2 to min assist x2/max assist x1, and is gradually increasing wheelchair mobility distance although fluctuates between SBA to min assist.  Pt continues to demonstrate trunk and LE weakness and fatigue quickly. Pt requires positive encouragement at times to participate in PT especially with fatigue.   Pt would continue to benefit from skilled PT services to continue to improve her ability to complete functional mobility, reduce her risk for falls and allow patient to return to home independently. Equipment Needed: No  Other: Pt has RW, manual w/c and mechanical lift device    SPEECH THERAPY  Patient has met 3/4 STGs and 0/1 LTGs this reporting period. Patient has made gains in recall, basic executive functioning, and thought organization. Patient continues to demonstrate deficits in attention and complex executive functioning. Patient has a mild cognitive linguistic impairment characterized by a score of 20/30 on the [facility]. Receptive and expressive language WFL. No dysphonia/aphonia with utilization of PMV and no dysarthria with speech intelligibility approximating 100% at the conversational level. Patient currently consuming a regular texture diet with thin liquids. Patient would benefit from continued skilled ST services to address aforementioned cognitive skills. Patient endorsing desire to resume independent  management of medications upon discharge. At this time, patient would benefit from skilled ST services via [facility] or OP upon discharge, clearance from physician and driving evaluation prior to return to driving, and supervision with IADLs. Diego SKimberly is making slow steady progress towards therapy goals during short stay on IPR thus far. She has made progression in UB dressing with ability to complete with Oscar. She continues to require maxA for LB dressing/bathing tasks. Throughout the night/this AM patient has been having complications with catheter, urine going around catheter tubing. Trish Felder is completing functional transfers with use of jose stedy fluctuating between modA+2 to modA+1 and CGA+1 with increased time and cues for technique. She continues to exhibit decreased strength, activity tolerance, increase of pain in RLE with movement, increased need for assist during BADL routine which limits her from meeting her goals. Trish Felder is typically independent with BADL routine with SBA from  for safety measures.  She is currently using 6L via trach mask. Pt continues to require skilled OT intervention to improve strength, activity tolerance, safety awareness, ADL/IADL performance required for pt to return home at [facility]. Without skilled services patient is at risk for falls, decrease in overall function and increased caregiver burden. Other: Monitor pending progress    RECREATIONAL THERAPY  Patient has been offered participation in recreational therapy activities and participates as able. Attempted to evaluate pt for RT x2 and not available-will try again tomorrow      NUTRITION  Weight: 230 lb 6.1 oz (104.5 kg) / Body mass index is 42.14 kg/m². Current diet: ADULT DIET; Regular; 4 carb choices (60 gm/meal); Low Sodium (2 gm); 2000 ml  ADULT ORAL NUTRITION SUPPLEMENT; Breakfast, Dinner, Lunch; Diabetic Oral Supplement  Please see nutrition note for details. NURSING  Continent of Bowel: Yes. Frequency: every 1-2 days . Management: Colace, Glycolax as needed. Continent of Bladder: No.  On werner catheter  Pain is Managed:  Yes. Management: Oxycodone. Frequency of Intervention: as needed. Adequately Controlled: Yes  Sleep: Adequate  Signs and Symptoms of Infection:  No.   Signs and Symptoms of Skin Breakdown:  Yes. Site: redness of coccyx, excoriation of skin folds. Wound Care: Triad cream to coccyx, Miconazole powder to skin folds . Injury and/or Falls during Inpatient Rehabilitation Admission: No  Anticoagulants: Aspirin, Xarelto   Diabetic: Yes: Home Meds: Novolog. Hospital Meds: Lantus. Educational Needs: Ongoing education for diabetes and diet. .    Consultations/Labs/X-rays: No  Oxygen while on IP Rehab:  Yes Currently using  6 liters per trach mask, FIO2 28 %  liters per 6  . Home oxygen: Yes  Patient/Family Education Focus: Continue therapy education, ongoing trach care and oxygen use.    Barriers to Education: No     Recent Labs     02/13/23  2251 02/14/23  0747 02/15/23  0809   POCGLU 133* 147* 116*       No results found for: LDLCALC, LDLCHOLESTEROL, LDLDIRECT      Vitals:    02/14/23 2350 02/15/23 0040 02/15/23 0536 02/15/23 0826   BP:    (!) 90/58   Pulse:  80  85   Resp: 16 16 20 20   Temp:    98.1 °F (36.7 °C)   TempSrc:    Oral   SpO2:  98%  100%   Weight:       Height:              Family Education: Family available and participating in education   Fall Risk:  Falling star program initiated  Is the patient appropriate for a stay in the functional apartment? no    Discharge Plan   Estimated Discharge Date:  2/24/2023    Destination: discharge home with supervision  Services at Discharge: [address] [place] [address] Rd, Occupational Therapy, Speech Therapy, Nursing, and HHA 3x week  Is patient appropriate for an outpatient driving evaluation? Yes but until after clearance by surgeon  Equipment at Discharge: Other: Monitor pending progress  Other: Pt has RW, manual w/c and mechanical lift device  Factors facilitating achievement of predicted outcomes: Family support, Motivated, Cooperative, and Pleasant  Barriers to the achievement of predicted outcomes: Pain, Cognitive deficit, Decreased motivation, Unrealistic expectations, Decreased endurance, Lower extremity weakness, Incontinence of bladder, Stairs at home, Skin Care, and Wound Care  Follow up with physiatrist? no  If yes, what timeframe? N/A    Team Members Present at Conference:  Rio Huang, LSW, MSW   Occupational Therapist:Corie Frost OTR/L [id number]  Physical Therapist:Stella Gallegos, [address], [address], [address]  Nurse:Ana María Ortiz RN  Psychologist: Johann Guan, PhD.    I approve the established interdisciplinary plan of care as documented within the medical record of Jan Blas.     Maricruz Ray MD

## 2023-02-13 NOTE — PROGRESS NOTES
[facility]  Individualized Disclosure Statement      Patient: Ton Rayo      Scope of Service  [facility] provides 24 hour individualized service to patients with functional limitations due to, but not limited to: stroke, brain injury, spinal cord injury, major multiple trauma, fractures, amputation, and neurological disorders. The [address] [place] [address] provides rehabilitative nursing and medical services as well as physical, occupational, speech, and recreation therapies. [address] [facility] is fully accredited by the Commission on Accreditation of Rehabilitation Facilities (CARF) as a comprehensive provider of rehabilitation services. Patients admitted to the [address] [place] [address] receive a minimum of three hours of therapy per day, at least six days per week, with a revised therapy schedule on weekends and holidays. Physical therapy, occupational therapy, and speech therapy are provided seven days per week including holidays. Other therapeutic services are available on weekends and evenings as needed or scheduled. Intensity of Treatment  Your treatment program will consist of Nursing Care and:  1.5 hours of Physical Therapy, per day  1.5 hours of Occupational Therapy, per day   30-60 minutes of Speech Therapy, per day  1 hour of Recreational Therapy, per week    [address] [address] maintains contracts with most insurance plans. Depending on the type of coverage, the insurance may impose limits on the coverage for rehabilitation care. Coverage is based on the premise that you are able to fully participate in the rehabilitation program and show continued progress. Please verify your own insurance information A copy of this was given to the patient/ family on this date.   Insurance Coverage  Your insurance company has made the following determination relative to the length of your stay:   Your estimated length of stay is 14 days   Your insurance Coverage has been verified as follows:    Primary Insurance: Payor: Ravi Keane /  /  /    Deductible: $1600     Coverage: Active  Secondary Insurance:[employer]  secondary insurance policies often cover co-pay amounts, but to ensure payment please contact your insurance company.     Alternative Resources: Please ask the  for more information [phone]

## 2023-02-13 NOTE — PROGRESS NOTES
Focus Note   2/13/23- Patient was incontinent of stool. Patient was also leaking around Werner Cath. Dressing changed to R hip due to saturation. Patient up with 1 assist in the [facility]. Patient provides trach care to self, education provided on the importance of changing the inner cannula BID. -AW. 2/17/23 Trach care performed using sterile technique and inner canula changed this shift with assistance of YVETTE Horne. and educated on hospital guidelines of doing trach care. Stoma shows no s/s infection. Scant drainage tan color on lower part of stoma. Splint sponge applied. Tolerated procedure well. Resp easy and unlabored Extra canula at bedside. Extra trach care supplies are in a labeled container in her closet. . Humidified oxygen cont this shift and changed x 1 per Respiratory therapy. Respiratory therapy has Flow at 8l/m and FI02 is 40%. -PV    2/19- Inner cannula changed, and discarded of the old one. Patient continues to do trach care herself. Patient incontinent of stool. Patient is having bladder spasms, and voiding around her werner cath. Patient does not wear her speaking valve because \"it obstructs air flow\". Patient is up with 2 on the [address] [place] [address] stedy. R hip incisions are open to air.     2/20 - Patient continues to do trach care herself. Patient needs to be reminded to use speaking valve. R. Hip incisions are are clean dry and intact and MARIAM. Patient up with sera steady (2 assist) and no complaints of pain at this time. - YD    2/21-Patient c/o nausea and nurse administered Zophran 2 times this shift. Patient also incontinent of brown loose stools times 3 this shift. 2/27~Patient completes trach care independently. Does not use passey martin valve when speaking and is difficult to communicate with. Right hip incisions clean, dry, and intact. Voltaren gel applied to painful areas per orders. SJW    2/28~Educated on only keeping the O2 high enough to keep O2 saturation above 93%.  O2 was bumped up to 6.  SJW

## 2023-02-13 NOTE — PROGRESS NOTES
[address]  Occupational Therapy  Daily Note  Time:   Time In: 1400  Time Out: 1430  Timed Code Treatment Minutes: 30 Minutes  Minutes: 30          Date: 2023  Patient Name: Silvia Jose,   Gender: female      Room: [facility]/055-A  MRN: [id number]  : 1950  (81 y.o.)  Referring Practitioner: Delfino Hollis MD  Diagnosis: closed intertrochanteric fracture, right  Additional Pertinent Hx: The patient was recently hospitalized from 2023 to 2023 initially for abdominal bloating, nausea and leg swelling. She was found to have elevated D-dimer. Subsequent testing revealed right lower lobe pulmonary embolism and right lower extremity DVT. She was at initially treated with IV heparin and later transition to [facility]. The patient was brought to ER on 2022 because of progressively increased leg swelling, and nausea/vomiting associated with decreased oral intake. She was found to have BNP of 256.7 and Bumex was prescribed. She then had a fall accident  when she was going to toilet while she was in ER on 2022. The fall resulted severe right hip pain. X-ray of right hip and pelvis revealed acute right femoral intertrochanteric fracture with subtrochanteric extension. Orthopedic service was consulted. Xarelto was held in preparation for surgical management. Cardiology was consulted on 2023 for elevated BNP and Lasix was started. Because of difficulty voiding with urinary retention, urology was also consulted on 2023. Urology service placed Wen with some difficulty on 2023. Therefore Wen was kept in place. On 2/3/2023 the patient underwent open treatment of right intertrochanteric fracture with cephalomedullary nail by Dr. Yvan Nicole. She is allowed weightbearing as tolerated at the right lower extremity postoperatively.     Restrictions/Precautions:  Restrictions/Precautions: General Precautions, Fall Risk, Weight Bearing  Right Lower Extremity Weight Bearing: Weight Bearing As Tolerated  Position Activity Restriction  Other position/activity restrictions: tracheostomy/collar with moist air. With venturi mask for out of room use 8 liters portable O2. SUBJECTIVE: Up in chair upon arrival, agreeable to OT session, cooperative,  present at end of session    PAIN: 8/10: back    Vitals: Vitals not assessed per clinical judgement, see nursing flowsheet    COGNITION: Slow Processing, Impaired Memory, and Decreased Problem Solving    ADL:   No ADL's completed this session. .      ADDITIONAL ACTIVITIES:  Patient completed BUE strengthening exercises with skilled education on HEP: completed x10 reps x1 set with a orange band in all joints and all planes in order to improve UE strength and activity tolerance required for BADL routine and toilet / shower transfers. Patient tolerated well, requiring  minima rest breaks. Patient also required minimal cues for technique.  updated on current progress and abilities voiced understanding and appreciation        ASSESSMENT:     Activity Tolerance:  Patient tolerance of  treatment: good. Discharge Recommendations: Continue to assess pending progress  Equipment Recommendations: Other: Monitor pending progress  Plan: Times Per Week: 5x per week for 90 minutes  Times Per Day: Once a day  Current Treatment Recommendations: Balance training, Functional mobility training, Endurance training, Self-Care / ADL, Safety education & training, Patient/Caregiver education & training, Strengthening    Patient Education  Patient Education: Home Exercise Program    Goals  Short Term Goals  Time Frame for Short Term Goals: until discharge  Short Term Goal 1: Pt will tolerate 12-15 min EOB ADL task with S to improve trunk control and activity tolerance required for EOB ADLs.   Short Term Goal 2: Pt will complete sit-stand t/fs with mod A x 1 with minimal cues of technique to progress towards BSC t/fs  Short Term Goal 3: Pt will tolerate standing 1 min with min A for increased ease of clothing management nad LB bathing routine  Short Term Goal 4: Pt will tolerate further assessment of functional t/fs by OTR when appropriate  Long Term Goals  Time Frame for Long Term Goals : until discharge  Long Term Goal 1: Pt will complete sit-stand t/fs with SBA x 1 with minimal cues of technique to progress towards BSC t/fs  Long Term Goal 2: Pt will perform UB/LB dressing and bathing with Min A and minimal cues for ECT to improve functional independence. Following session, patient left in safe position with all fall risk precautions in place.

## 2023-02-14 LAB
ANION GAP SERPL CALC-SCNC: 11 MEQ/L (ref 8–16)
BACTERIA URNS QL MICRO: ABNORMAL /HPF
BILIRUB UR QL STRIP.AUTO: NEGATIVE
BUN SERPL-MCNC: 10 MG/DL (ref 7–22)
CALCIUM SERPL-MCNC: 8.7 MG/DL (ref 8.5–10.5)
CASTS #/AREA URNS LPF: ABNORMAL /LPF
CASTS 2: ABNORMAL /LPF
CHARACTER UR: ABNORMAL
CHLORIDE SERPL-SCNC: 104 MEQ/L (ref 98–111)
CO2 SERPL-SCNC: 25 MEQ/L (ref 23–33)
COLOR: YELLOW
CREAT SERPL-MCNC: 0.5 MG/DL (ref 0.4–1.2)
CRYSTALS URNS MICRO: ABNORMAL
DEPRECATED RDW RBC AUTO: 56 FL (ref 35–45)
EPITHELIAL CELLS, UA: ABNORMAL /HPF
ERYTHROCYTE [DISTWIDTH] IN BLOOD BY AUTOMATED COUNT: 16.7 % (ref 11.5–14.5)
GFR SERPL CREATININE-BSD FRML MDRD: > 60 ML/MIN/1.73M2
GLUCOSE BLD STRIP.AUTO-MCNC: 147 MG/DL (ref 70–108)
GLUCOSE SERPL-MCNC: 114 MG/DL (ref 70–108)
GLUCOSE UR QL STRIP.AUTO: NEGATIVE MG/DL
HCT VFR BLD AUTO: 26.3 % (ref 37–47)
HGB BLD-MCNC: 8.2 GM/DL (ref 12–16)
HGB UR QL STRIP.AUTO: ABNORMAL
KETONES UR QL STRIP.AUTO: NEGATIVE
MCH RBC QN AUTO: 29 PG (ref 26–33)
MCHC RBC AUTO-ENTMCNC: 31.2 GM/DL (ref 32.2–35.5)
MCV RBC AUTO: 92.9 FL (ref 81–99)
MISCELLANEOUS 2: ABNORMAL
NITRITE UR QL STRIP: NEGATIVE
PH UR STRIP.AUTO: 5 [PH] (ref 5–9)
PLATELET # BLD AUTO: 278 THOU/MM3 (ref 130–400)
PMV BLD AUTO: 9.4 FL (ref 9.4–12.4)
POTASSIUM SERPL-SCNC: 3.5 MEQ/L (ref 3.5–5.2)
PROT UR STRIP.AUTO-MCNC: NEGATIVE MG/DL
RBC # BLD AUTO: 2.83 MILL/MM3 (ref 4.2–5.4)
RBC URINE: ABNORMAL /HPF
RENAL EPI CELLS #/AREA URNS HPF: ABNORMAL /[HPF]
SODIUM SERPL-SCNC: 140 MEQ/L (ref 135–145)
SP GR UR REFRACT.AUTO: 1.02 (ref 1–1.03)
UROBILINOGEN, URINE: 0.2 EU/DL (ref 0–1)
WBC # BLD AUTO: 6.7 THOU/MM3 (ref 4.8–10.8)
WBC #/AREA URNS HPF: > 200 /HPF
WBC #/AREA URNS HPF: ABNORMAL /[HPF]
YEAST LIKE FUNGI URNS QL MICRO: ABNORMAL

## 2023-02-14 PROCEDURE — 97130 THER IVNTJ EA ADDL 15 MIN: CPT

## 2023-02-14 PROCEDURE — 1180000000 HC REHAB R&B

## 2023-02-14 PROCEDURE — 97110 THERAPEUTIC EXERCISES: CPT

## 2023-02-14 PROCEDURE — 2580000003 HC RX 258: Performed by: NURSE PRACTITIONER

## 2023-02-14 PROCEDURE — 97535 SELF CARE MNGMENT TRAINING: CPT

## 2023-02-14 PROCEDURE — 97530 THERAPEUTIC ACTIVITIES: CPT

## 2023-02-14 PROCEDURE — 81001 URINALYSIS AUTO W/SCOPE: CPT

## 2023-02-14 PROCEDURE — 85027 COMPLETE CBC AUTOMATED: CPT

## 2023-02-14 PROCEDURE — 2580000003 HC RX 258: Performed by: PHYSICAL MEDICINE & REHABILITATION

## 2023-02-14 PROCEDURE — 2700000000 HC OXYGEN THERAPY PER DAY

## 2023-02-14 PROCEDURE — 80048 BASIC METABOLIC PNL TOTAL CA: CPT

## 2023-02-14 PROCEDURE — 97129 THER IVNTJ 1ST 15 MIN: CPT

## 2023-02-14 PROCEDURE — 94760 N-INVAS EAR/PLS OXIMETRY 1: CPT

## 2023-02-14 PROCEDURE — 99232 SBSQ HOSP IP/OBS MODERATE 35: CPT | Performed by: PHYSICAL MEDICINE & REHABILITATION

## 2023-02-14 PROCEDURE — 36415 COLL VENOUS BLD VENIPUNCTURE: CPT

## 2023-02-14 PROCEDURE — 87086 URINE CULTURE/COLONY COUNT: CPT

## 2023-02-14 PROCEDURE — 82948 REAGENT STRIP/BLOOD GLUCOSE: CPT

## 2023-02-14 PROCEDURE — 6370000000 HC RX 637 (ALT 250 FOR IP): Performed by: PHYSICAL MEDICINE & REHABILITATION

## 2023-02-14 PROCEDURE — 6370000000 HC RX 637 (ALT 250 FOR IP): Performed by: NURSE PRACTITIONER

## 2023-02-14 RX ORDER — TRAMADOL HYDROCHLORIDE 50 MG/1
100 TABLET ORAL EVERY 6 HOURS PRN
Status: DISCONTINUED | OUTPATIENT
Start: 2023-02-14 | End: 2023-03-02 | Stop reason: HOSPADM

## 2023-02-14 RX ORDER — TRAMADOL HYDROCHLORIDE 50 MG/1
50 TABLET ORAL EVERY 6 HOURS PRN
Status: DISCONTINUED | OUTPATIENT
Start: 2023-02-14 | End: 2023-03-02 | Stop reason: HOSPADM

## 2023-02-14 RX ORDER — SIMETHICONE 80 MG
80 TABLET,CHEWABLE ORAL EVERY 6 HOURS PRN
Status: DISCONTINUED | OUTPATIENT
Start: 2023-02-14 | End: 2023-03-02 | Stop reason: HOSPADM

## 2023-02-14 RX ORDER — SODIUM CHLORIDE 9 MG/ML
INJECTION, SOLUTION INTRAVENOUS ONCE
Status: COMPLETED | OUTPATIENT
Start: 2023-02-14 | End: 2023-02-14

## 2023-02-14 RX ORDER — PANTOPRAZOLE SODIUM 40 MG/1
40 TABLET, DELAYED RELEASE ORAL
Status: DISCONTINUED | OUTPATIENT
Start: 2023-02-14 | End: 2023-03-02 | Stop reason: HOSPADM

## 2023-02-14 RX ADMIN — INSULIN GLARGINE 28 UNITS: 100 INJECTION, SOLUTION SUBCUTANEOUS at 21:43

## 2023-02-14 RX ADMIN — SIMETHICONE 80 MG: 80 TABLET, CHEWABLE ORAL at 15:43

## 2023-02-14 RX ADMIN — SODIUM CHLORIDE: 9 INJECTION, SOLUTION INTRAVENOUS at 05:48

## 2023-02-14 RX ADMIN — SODIUM CHLORIDE, PRESERVATIVE FREE 10 ML: 5 INJECTION INTRAVENOUS at 23:50

## 2023-02-14 RX ADMIN — Medication 1 TABLET: at 09:25

## 2023-02-14 RX ADMIN — RIVAROXABAN 20 MG: 20 TABLET, FILM COATED ORAL at 17:32

## 2023-02-14 RX ADMIN — ONDANSETRON 4 MG: 4 TABLET, ORALLY DISINTEGRATING ORAL at 17:32

## 2023-02-14 RX ADMIN — FOLIC ACID 1 MG: 1 TABLET ORAL at 09:25

## 2023-02-14 RX ADMIN — NALOXEGOL OXALATE 12.5 MG: 12.5 TABLET, FILM COATED ORAL at 05:44

## 2023-02-14 RX ADMIN — TRAMADOL HYDROCHLORIDE 100 MG: 50 TABLET, COATED ORAL at 23:50

## 2023-02-14 RX ADMIN — ACETAMINOPHEN 650 MG: 325 TABLET ORAL at 09:25

## 2023-02-14 RX ADMIN — MICONAZOLE NITRATE: 20 POWDER TOPICAL at 09:27

## 2023-02-14 RX ADMIN — FERROUS SULFATE TAB 325 MG (65 MG ELEMENTAL FE) 325 MG: 325 (65 FE) TAB at 09:25

## 2023-02-14 RX ADMIN — FUROSEMIDE 20 MG: 20 TABLET ORAL at 09:25

## 2023-02-14 RX ADMIN — DICLOFENAC SODIUM 2 G: 10 GEL TOPICAL at 09:26

## 2023-02-14 RX ADMIN — PANTOPRAZOLE SODIUM 40 MG: 40 TABLET, DELAYED RELEASE ORAL at 09:25

## 2023-02-14 RX ADMIN — Medication 6 MG: at 21:36

## 2023-02-14 RX ADMIN — OXYCODONE HYDROCHLORIDE 5 MG: 5 TABLET ORAL at 05:45

## 2023-02-14 RX ADMIN — METOPROLOL SUCCINATE 25 MG: 25 TABLET, FILM COATED, EXTENDED RELEASE ORAL at 09:25

## 2023-02-14 RX ADMIN — QUETIAPINE FUMARATE 25 MG: 25 TABLET ORAL at 21:36

## 2023-02-14 RX ADMIN — DOCUSATE SODIUM 100 MG: 100 CAPSULE, LIQUID FILLED ORAL at 21:36

## 2023-02-14 RX ADMIN — ACETAMINOPHEN 650 MG: 325 TABLET ORAL at 21:36

## 2023-02-14 RX ADMIN — TRAZODONE HYDROCHLORIDE 100 MG: 100 TABLET ORAL at 21:36

## 2023-02-14 RX ADMIN — ROSUVASTATIN 10 MG: 10 TABLET, FILM COATED ORAL at 09:25

## 2023-02-14 RX ADMIN — SODIUM CHLORIDE, PRESERVATIVE FREE 10 ML: 5 INJECTION INTRAVENOUS at 09:28

## 2023-02-14 RX ADMIN — OXYCODONE HYDROCHLORIDE 5 MG: 5 TABLET ORAL at 15:38

## 2023-02-14 RX ADMIN — ASPIRIN 81 MG 81 MG: 81 TABLET ORAL at 09:25

## 2023-02-14 RX ADMIN — ACETAMINOPHEN 650 MG: 325 TABLET ORAL at 15:38

## 2023-02-14 RX ADMIN — ACETAMINOPHEN 650 MG: 325 TABLET ORAL at 05:44

## 2023-02-14 RX ADMIN — SENNOSIDES 17.2 MG: 8.6 TABLET, FILM COATED ORAL at 21:36

## 2023-02-14 RX ADMIN — MICONAZOLE NITRATE: 20 POWDER TOPICAL at 21:37

## 2023-02-14 ASSESSMENT — ENCOUNTER SYMPTOMS
COUGH: 0
SHORTNESS OF BREATH: 0
SORE THROAT: 0
RHINORRHEA: 0
TROUBLE SWALLOWING: 0
WHEEZING: 0
VOMITING: 0
BACK PAIN: 1
DIARRHEA: 0
NAUSEA: 0
CONSTIPATION: 0

## 2023-02-14 ASSESSMENT — PAIN DESCRIPTION - LOCATION
LOCATION: HIP

## 2023-02-14 ASSESSMENT — PAIN DESCRIPTION - DESCRIPTORS
DESCRIPTORS: ACHING;TIGHTNESS;TENDER
DESCRIPTORS: SORE;TENDER
DESCRIPTORS: ACHING

## 2023-02-14 ASSESSMENT — PAIN DESCRIPTION - DIRECTION: RADIATING_TOWARDS: RT HIP AND LEG

## 2023-02-14 ASSESSMENT — PAIN SCALES - GENERAL
PAINLEVEL_OUTOF10: 8
PAINLEVEL_OUTOF10: 6
PAINLEVEL_OUTOF10: 8
PAINLEVEL_OUTOF10: 8
PAINLEVEL_OUTOF10: 2

## 2023-02-14 ASSESSMENT — PAIN - FUNCTIONAL ASSESSMENT: PAIN_FUNCTIONAL_ASSESSMENT: PREVENTS OR INTERFERES SOME ACTIVE ACTIVITIES AND ADLS

## 2023-02-14 ASSESSMENT — PAIN DESCRIPTION - ORIENTATION
ORIENTATION: RIGHT

## 2023-02-14 NOTE — PLAN OF CARE
Seen by wound care for redness on coccyx, using protective cream. Oxycodone and tylenol for pain   Problem: Chronic Conditions and Co-morbidities  Goal: Patient's chronic conditions and co-morbidity symptoms are monitored and maintained or improved  2/14/2023 0406 by Jesús Cruz RN  Outcome: Progressing     Problem: Skin/Tissue Integrity  Goal: Absence of new skin breakdown  Description: 1. Monitor for areas of redness and/or skin breakdown  2. Assess vascular access sites hourly  3. Every 4-6 hours minimum:  Change oxygen saturation probe site  4. Every 4-6 hours:  If on nasal continuous positive airway pressure, respiratory therapy assess nares and determine need for appliance change or resting period.   Outcome: Progressing     Problem: Pain  Goal: Verbalizes/displays adequate comfort level or baseline comfort level  Outcome: Progressing

## 2023-02-14 NOTE — PROGRESS NOTES
[address] [place] [facility]AP[facility]  [facility]  PROGRESS NOTE    TIME   SLP Individual Minutes  Time In: 0830  Time Out: 0900  Minutes: 30  Timed Code Treatment Minutes: 30 Minutes       Date: 2023  Patient Name: Silvia Jose      CSN: [id number]   : 1950  (81 y.o.)  Gender: female   Referring Physician:  Delfino Hollis MD  Diagnosis: Closed intertrochanteric fracture, right,  initial encounter  Precautions: Fall risk  Current Diet: Regular and Thin Liquids   Swallowing Strategies: Standard Universal Swallow Precautions  Date of Last MBS/FEES: Not Applicable    Pain:  0/03 - Pain location: R hip - RN aware    Subjective:  Patient sitting in recliner upon ST arrival. Patient agreeable to skilled ST services. ST provided set-up assistance for PMV placement at the beginning of session. Pleasant, cooperative, and engaged throughout. Short-Term Goals:  SHORT TERM GOAL #1:  Goal 1: Patient will complete functional problem solving and reasoning tasks with 70% accuracy and moderate cuing in order to improve completion of ADLs/IADLs at discharge. - GOAL MET; REVISE  REVISED: Goal 1: Patient will complete functional problem solving and reasoning tasks with 80% accuracy and min cuing in order to improve completion of ADLs/IADLs at discharge. INTERVENTIONS: Did not address d/t focus on other goals    PRIOR SESSION  Medication Management - Pill Box Review  5/5 independently. *excellent success with task   *patient endorses  was managing medications given frequent hospitalization; however, patient reporting she would like to work towards independent management of medications during IPR stay.      Money Management - Counting Bills/Coins   5/6 independently, 1/6 given min cues    *good success with task   *good utilization of self-talk to assist with mental manipulation   *patient endorses utilizing mostly cash    SHORT TERM GOAL #2:  Goal 2: Patient will complete functional immediate, working, and delayed recall tasks of 4+ units of information, with or without use of trained recall strategies, with 70% accuracy given mod cues to improve retention of pertinent medical information. -GOAL MET; REVISE  REVISED: Goal 2: Patient will complete functional immediate, working, and delayed recall tasks of 4+ units of information, with or without use of trained recall strategies, with 80% accuracy given min cues to improve retention of pertinent medical information. INTERVENTIONS:   Delayed Recall ST Info  Delayed (~1 day): 5/6 independent, 1/6 given categorical cue    PRIOR SESSION  Immediate/Delayed Recall   Delayed Recall of WRAP (48 hours): 4/4 total assist despite written visual on white board    ST completed review of STM strategies using the acronym WRAP--Write it down, Repeat it, Associate it, and Pictures it. ST then provided patient with 5 new units of information r/t ST (Lord Hickman, [employer], [employer] in [place], [place]). Patient utilized write it down, repeat it, and associate it. List remaining in room on white board. Immediate recall: 4/6 independently, 2/6 given min cues   Delayed recall (10  minutes): 6/6 independently   Recall of WRAP Strategies Utilized: 1/3 independently, 1/3 given mod cues, 1/3 total assist     SHORT TERM GOAL #3:  Goal 3: Patient will complete functional thought organization and sequencing exercises with 80% accuracy and moderate cuing  in order to improve  completion  of  ADLs/IADLs. - GOAL MET; REVISE  REVISED: Goal 3: Patient will complete functional thought organization and sequencing exercises with 85% accuracy and min cuing  in order to improve  completion  of  ADLs/IADLs. INTERVENTIONS:   Monthly Calendar Organization  10/10 independent    *increased time to complete task, however, patient with good success on task.     SHORT TERM GOAL #4:  Goal 4: Patient will complete sustained, selective, alternating, and divided attention tasks with no more than three  errors/redirections in five minutes/one task in order to allow for safe return to driving at discharge. - GOAL NOT MET; CONTINUE  INTERVENTIONS:   Rule of Ten  12 cards placed face up in front of patient. Patient with remaining stack of cards, flipping top card in stack and placing on one of the 12 cards so that the value of the two cards is 10. If a sum of 10 cannot be made, patient places card at bottom of stack. Trial 1: patient with x3 errors in 4:01  Trial 2: patient with x5 errors in 6:29    *patient with poor visual scanning, working memory, and math computations during task  *patient with errors in missing numbers when scanning, forgetting value of 10 (thought 21 was value), and adding amounts to 12 (7+5, 8+4)    Long-Term Goals:  Time Frame for Long Term Goals: 3 weeks    LONG TERM GOAL #1:  Goal 1: Patient will improve cognitive  functioning  to a level of modified independent in order to allow for safe return to PLOF. - GOAL NOT MET         Comprehension: 5 - Patient understands basic needs (hungry/hot/pain)  Expression: 5 - Expresses basic ideas/needs only (hungry/hot/pain)  Social Interaction: 5 - Patient is appropriate with supervision/cues  Problem Solvin - Patient solves simple/routine tasks 75-90%+   Memory: 3 - Patient remembers 50%-74% of the time    EDUCATION:  Learner: Patient  Education:  Reviewed ST goals and Plan of Care and Education Related to [employer] [facility]  Evaluation of Education: Verbalizes understanding, Demonstrates with assistance, and Needs further instruction    ASSESSMENT/PLAN:  Summary: Patient has met 3/4 STGs and 0/1 LTGs this reporting period. Patient has made gains in recall, basic executive functioning, and thought organization. Patient continues to demonstrate deficits in attention and complex executive functioning.  Patient has a mild cognitive linguistic impairment characterized by a score of 20/30 on the [address] [place] [address]. Receptive and expressive language WFL. No dysphonia/aphonia with utilization of PMV and no dysarthria with speech intelligibility approximating 100% at the conversational level. Patient currently consuming a regular texture diet with thin liquids. Patient would benefit from continued skilled ST services to address aforementioned cognitive skills. Patient endorsing desire to resume independent  management of medications upon discharge. At this time, patient would benefit from skilled ST services via [facility] or OP upon discharge, clearance from physician and driving evaluation prior to return to driving, and supervision with IADLs. Activity Tolerance:  Patient tolerance of  treatment: good. Assessment/Plan: Patient progressing toward established goals. Continues to require skilled care of licensed speech pathologist to progress toward achievement of established goals and plan of care. .     Plan for Next Session: attention, money management, sequencing  Discharge Recommendations: Continue to Assess Pending Progress     Meli CARSON  Clinician

## 2023-02-14 NOTE — PROGRESS NOTES
[address] [address]  INPATIENT PHYSICAL THERAPY  Progress Note  [facility] - [id number]/[id number]    Time In: 1000  Time Out: 1100  Timed Code Treatment Minutes: 60 Minutes  Minutes: 60          Date: 2023  Patient Name: Rolan Buchanan,  Gender:  female        MRN: [id number]  : 1950  (81 y.o.)     Referring Practitioner: Juan Burns MD  Diagnosis: closed intertrochanteric fx, Rt  Additional Pertinent Hx: Anthony Young is a 73yoF with PMH of recent PE and b/l DVT, macrocytic anemia, CAD, DM2, tracheostomy dependent, obesity, and HTN who presents for leg swelling, nausea, and constipation. She was recently discharged after an 8 day admission for DVTs and PEs. She was placed on Xarelto when discharged and stated that she has been compliant and only missed one dose. She returned due to chronic constipation, feeling ''bloated'', nausea, and LE edema. While being evaluated in the ED the pt had a mechanical fall and developed a R femur fracture. She was found to have supratheraputic INR at 3.83. CT head and C-spine both clear. Pt is s/p R femur ORIF by Dr Van Tejeda . Pt found to have acute fractures of L2-S1 likely 2/2 osteoporosis, ok for activity as tolerated per Dr Paula Ortiz . Prior Level of Function:  Lives With: Spouse  Type of Home: House  Home Layout: One level  Home Access: Ramped entrance (or 1 step with no rails.)  Home Equipment: Maricarmen Negus, rolling, Lift chair (Pt sleeps in lift chair and was using it to assist up to stand PTA)    Vitals: Oxygen: 100%    Restrictions/Precautions:  Restrictions/Precautions: General Precautions, Fall Risk, Weight Bearing  Right Lower Extremity Weight Bearing: Weight Bearing As Tolerated  Position Activity Restriction  Other position/activity restrictions: tracheostomy/collar with moist air. With venturi mask for out of room use 8 liters portable O2. SUBJECTIVE: Patient in room in recliner, daughter present.   Pt initially stating her therapy was cancelled for the day. Pt noted difficulty with catheter and sleeping last night, so her therapy was cancelled. PT confirmed with RN that pt okay to participate in PT. Pt required encouragement throughout. PAIN: right hip, not rated    OBJECTIVE:  Bed Mobility:  Not Tested    Transfers:  Sit to Stand: with Tommy Gamble with min assist x2   Stand to [facility] [address] with min assist x2  *Assist for right foot position  *Verbal cues for upright posture as pt initially leaning forearms on the front of Tommy Gamble    Ambulation:  Not attempted    Exercise:  Patient was guided in 1 set(s) 2 sets of 5 -10 reps of exercise to both lower extremities. Supine: ankle pumps x10, heel slides with active assist right and CGA left LE, hip abduction with active assist right LE and CGA left LE, glut sets. Seated: long arc quad 2 sets of 5. Seated without back support (cues for abdominal engagement and upright posture): pt instructed to reach forward to tray to colored/numbered pods in random sequence such as right hand to blue, left hand to 4, shoulder flexion with 4# weighted ball, diagonal chop with 4# weighted ball, pt instructed to tap ball (both hands holding onto ball) to colored/numbered pods in random sequence. Exercises were completed for increased independence with functional mobility. Functional Outcome Measures: Not completed       ASSESSMENT:  Assessment:   . Mrs Mejia Hebert met 0/4 STG's and 0/6 LTG's. Patient has not met any goals at this time, however with short length of stay on inpatient rehab since initial evaluation. Stephen Rodas is demonstrating improvements with sit < > stand, progressing from moderate assistance x2 to min assist x2/max assist x1, and is gradually increasing wheelchair mobility distance although fluctuates between SBA to min assist.  Pt continues to demonstrate trunk and LE weakness and fatigue quickly.   Pt requires positive encouragement at times to participate in PT especially with fatigue. Pt would continue to benefit from skilled PT services to continue to improve her ability to complete functional mobility, reduce her risk for falls and allow patient to return to home independently. Activity Tolerance:  Patient tolerance of  treatment: good. Equipment Recommendations:Equipment Needed: No  Other: Pt has RW, manual w/c and mechanical lift device  Discharge Recommendations:  Discharge Recommendations: Continue to assess pending progress, Patient would benefit from continued therapy after discharge    Plan: Current Treatment Recommendations: Strengthening, Balance training, Functional mobility training, Transfer training, Endurance training, Equipment evaluation, education, & procurement, Patient/Caregiver education & training, Safety education & training, Therapeutic activities, Home exercise program, Wheelchair mobility training, Gait training, Pain management  General Plan:  (5x/ wk 90 min)    Patient Education  Patient Education: Transfers, Verbal Exercise Instruction,  - Patient Verbalized Understanding, - Patient Requires Continued Education    Goals:  Patient Goals : get around o my own without the RW  Short Term Goals  Time Frame for Short Term Goals: 1 wk  Short Term Goal 1: Pt will go from supine <->sit, mod +1 to get in/out of bed. NOT MET, CONTINUE  Short Term Goal 2: Pt will get up/down from bed, into JEANNETTE stedy device, +1 mod assist to progress to up to RW. NOT MET, CONTINUE  Short Term Goal 3: Pt will  the JEANNETTE stedy device, +1 min assist x5 min, to progress to standing with RW. NOT MET, CONTINUE  Short Term Goal 4: Pt will propel w/c >= 50 ft, tile surface, CGA for home mobility. NOT MET, CONTINUE  Long Term Goals  Time Frame for Long Term Goals : 3 wks from evaluation. Long Term Goal 1: Pt to go supine <->sit, min +1 to get in/out of bed. Long Term Goal 2: Pt to get up/down from bed or w/c +1 min assist to get up to walk.   Long Term Goal 3: Pt to perform stand/pivot transfer with RW, +1 min assist to get in/out of w/c. Long Term Goal 4: Pt to walk with RW >= 10 ft, mn +1 to progress to home mobility  Long Term Goal 5: Pt to propel w/c >= 50 ft, Mod I, for home mobility  Additional Goals?: Yes  Long term goal 6: Pt to get in/out of car, min +1 for transportation needs.

## 2023-02-14 NOTE — PROGRESS NOTES
0945 am - Werner catheter draining minimal urine, bladder scan showed 583 ml urine, per Dr. Too Soriano / urology - balloon deflated, catheter cleansed, werner inserted to hub  re inflated balloon,  irrigated werner with sterile water, catheter started to drain. Serafin Ford NP from urology in the room, stated he is ok with catheter draining and feels since the werner is a 14 fr will drain slowly and may need irrigated occasionally. 1110 am - catheter drained 800 urine with sediment - notified Too Briggs of update.

## 2023-02-14 NOTE — PROGRESS NOTES
LVN bottle for patients trach mask was changed at this time. Set at 28%, 6L/min FiO2 with donut heater. Water bag was drained at this time as well.

## 2023-02-14 NOTE — PROGRESS NOTES
[address]  Occupational Therapy  Daily Note  Time:   Time In: 1400  Time Out: 1430  Timed Code Treatment Minutes: 30 Minutes  Minutes: 30    Date: 2023  Patient Name: Rita Pierre,   Gender: female      Room: [facility]/055-A  MRN: [id number]  : 1950  (81 y.o.)  Referring Practitioner: Dary Cobian MD  Diagnosis: closed intertrochanteric fracture, right  Additional Pertinent Hx: The patient was recently hospitalized from 2023 to 2023 initially for abdominal bloating, nausea and leg swelling. She was found to have elevated D-dimer. Subsequent testing revealed right lower lobe pulmonary embolism and right lower extremity DVT. She was at initially treated with IV heparin and later transition to [address] [place] [address]. The patient was brought to ER on 2022 because of progressively increased leg swelling, and nausea/vomiting associated with decreased oral intake. She was found to have BNP of 256.7 and Bumex was prescribed. She then had a fall accident  when she was going to toilet while she was in ER on 2022. The fall resulted severe right hip pain. X-ray of right hip and pelvis revealed acute right femoral intertrochanteric fracture with subtrochanteric extension. Orthopedic service was consulted. Xarelto was held in preparation for surgical management. Cardiology was consulted on 2023 for elevated BNP and Lasix was started. Because of difficulty voiding with urinary retention, urology was also consulted on 2023. Urology service placed Wen with some difficulty on 2023. Therefore Wen was kept in place. On 2/3/2023 the patient underwent open treatment of right intertrochanteric fracture with cephalomedullary nail by Dr. Rom Roman. She is allowed weightbearing as tolerated at the right lower extremity postoperatively.     Restrictions/Precautions:  Restrictions/Precautions: General Precautions, Fall Risk, Weight Bearing  Right Lower Extremity Weight Bearing: Weight Bearing As Tolerated  Position Activity Restriction  Other position/activity restrictions: Tracheostomy/collar 6L. With venturi mask for out of room use 8 liters portable O2. SUBJECTIVE: Patient pleasant and cooperative. Voices fatigue and eagerness to return to bed after session, but was able to persist throughout OT session well. PAIN: C/o R hip pain, doesn't rate, ice applied at EOS      Vitals: Nurse checked vitals prior to session    COGNITION: WFL    ADL:   No ADL's completed this session. Hong aMrrero BALANCE:  Sitting Balance:  Modified Independent. Standing Balance: Contact Guard Assistance. In [facility]     BED MOBILITY:  Sit to Supine: Moderate Assistance A with BLEs. TRANSFERS:  Sit to Stand: Moderate Assistance, X 1, with Rachel Diana, from recliner. (Can achieve CGA from [address], however is noted pt comes forward flexed and compensates with B forearms vs coming fully erect)   Stand to Sit: Moderate Assistance, X 1, with [place] Diana. With cues to control descend to EOB     ADDITIONAL ACTIVITIES:  Pt completed standing activities within [facility]. First task focused on standing with attempted 1UE release to retrieve linens and fold, however pt was compensating with B forearms vs coming fully erect and demo endurance of 50 seconds. Other events focused on static standing with full posture to improve standing tolerance for return to sinkside grooming and other ADLs with pt tolerating 45, 40 and 30 seconds with fatigue at each event and moderate lengthy rest breaks required between each event. ASSESSMENT:  Assessment: Clemente Marie is making slow steady progress towards therapy goals during short stay on IPR thus far. She has made progression in UB dressing with ability to complete with Oscar. She continues to require maxA for LB dressing/bathing tasks.  Throughout the night/this AM patient has been having complications with catheter, urine going around catheter tubing. Harry Cooper is completing functional transfers with use of jose stedy fluctuating between modA+2 to modA+1 and CGA+1 with increased time and cues for technique. She continues to exhibit decreased strength, activity tolerance, increase of pain in RLE with movement, increased need for assist during BADL routine which limits her from meeting her goals. Harry Cooper is typically independent with BADL routine with SBA from  for safety measures. She is currently using 6L via trach mask. Pt continues to require skilled OT intervention to improve strength, activity tolerance, safety awareness, ADL/IADL performance required for pt to return home at [facility]. Without skilled services patient is at risk for falls, decrease in overall function and increased caregiver burden. Activity Tolerance:  Patient tolerance of  treatment: good. Discharge Recommendations: Cont to assess, requires further OT   Equipment Recommendations: Other: Monitor pending progress  Plan: Times Per Week: 5x per week for 90 minutes  Times Per Day: Once a day  Current Treatment Recommendations: Balance training, Functional mobility training, Endurance training, Self-Care / ADL, Safety education & training, Patient/Caregiver education & training, Strengthening    Patient Education  Patient Education:  standing endurance, transfer safety     Goals  Short Term Goals  Time Frame for Short Term Goals: until discharge  Short Term Goal 1: Pt will tolerate 12-15 min EOB ADL task with S to improve trunk control and activity tolerance required for EOB ADLs.   Short Term Goal 2: Pt will complete sit-stand t/fs with mod A x 1 with minimal cues of technique to progress towards BSC t/fs  Short Term Goal 3: Pt will tolerate standing 1 min with min A for increased ease of clothing management nad LB bathing routine  Short Term Goal 4: Pt will tolerate further assessment of functional t/fs by OTR when appropriate  Long Term Goals  Time Frame for Long Term Goals : until discharge  Long Term Goal 1: Pt will complete sit-stand t/fs with SBA x 1 with minimal cues of technique to progress towards BSC t/fs  Long Term Goal 2: Pt will perform UB/LB dressing and bathing with Min A and minimal cues for ECT to improve functional independence. Following session, patient left in safe position with all fall risk precautions in place.

## 2023-02-14 NOTE — PROGRESS NOTES
[address] [address]  Diagnosis List for Inpatient Rehab facility (IRF) - Patient Assessment Instrument (JOYCE)    Patient Name: Allie Segura        MRN: [id number]    : 1950  (81 y.o.)  Gender: female     Primary impairment requiring rehabilitation: 8.11 Unilateral Hip Fracture     Etiologic Diagnosis that led to the condition: Right femoral intertrochanteric fracture with subtrochanteric extension    Comorbid conditions affecting rehabilitation:  Right femoral intertrochanteric fracture with subtrochanteric extension due to fall requiring open reduction and internal fixation with cephalomedullary nail  Glottic stenosis requiring tracheostomy  Persistent chronic low back pain due to lumbar and lower thoracic spine spondylosis with degenerative facet arthropathy at the lower 3 lumbar levels, and acute L2-S1 endplate fractures  Right posterior tibial, left greater saphenous, left peroneal and left anterior tibial veins deep vein thrombosis, and right lower lobe pulmonary embolism  Acute anemia requiring blood transfusion  Lumbar spine osteoporosis  Gluteal region decubitus ulcer  *Diabetes mellitus type 2 with poor blood glucose control and complicated by bilateral lower extremities diabetic polyneuropathy  *Morbid obesity  Hypertension  Hyperlipidemia  History of lower back pain with history of \"lumbar stress fracture\"  History of COVID infection with pneumonia  History of mild cognitive impairment  History of coronary artery disease requiring coronary artery stenting  Grade 1 left ventricular diastolic dysfunction with preserved ejection fraction    Leanna Godinez MD

## 2023-02-14 NOTE — CONSULTS
Department of Family Practice  Consult Note        Reason for Consult:  Medical management while on the Inpatient Rehab unit. Requesting Physician:  Dr Timmy Renee:   The need to continue the intensive time with therapies following the acute hospital stay. History Obtained From:  patient, EMR    HISTORY OF PRESENT ILLNESS:              The patient is a 81 y.o. female with significant past medical history of       Diagnosis Date    Arthritis     Coronary artery disease     COVID     Diabetic neuropathy ([address]     Bilateral feet numbness    DVT (deep venous thrombosis) ([address] 2023    Right lower extremity    Glottic stenosis     3/2022    Hyperlipidemia     Lower back pain     With diagnosis of having \"stress fracture\" by ortho at [facility]    Primary hypertension     Pulmonary embolism ([address] 2023    Right lower lobe    Type 2 diabetes mellitus ([address]       who presents with constipation and nausea that led her to go to the ED. In the ED she had a fall which resulted in a fracture of the right hip. She was admitted and the hip was eventually repaired. Post operatively she required some PRBC. Following becoming more medically stable, she has come to the Inpatient Rehab Unit to continue the time with therapies prior to a discharge disposition being made.       Past Medical History:        Diagnosis Date    Arthritis     Coronary artery disease     COVID     Diabetic neuropathy ([address]     Bilateral feet numbness    DVT (deep venous thrombosis) ([address] 2023    Right lower extremity    Glottic stenosis     3/2022    Hyperlipidemia     Lower back pain     With diagnosis of having \"stress fracture\" by ortho at [facility]    Primary hypertension     Pulmonary embolism ([address] 2023    Right lower lobe    Type 2 diabetes mellitus ([address]      Past Surgical History:        Procedure Laterality Date    CARDIAC SURGERY      CATARACT REMOVAL Bilateral      SECTION      3 times CORONARY ANGIOPLASTY WITH STENT PLACEMENT  2008    stenting twice    EYE SURGERY      HIATAL HERNIA REPAIR      late 1990s    HIP SURGERY Right 2/3/2023    RIGHT FEMUR IM NAIL WITH INTERTAN performed by Eryn Dobson MD at [address] [place] [address], [address] ([address])      in 1990s    LARYNGOSCOPY N/A 3/22/2022    SUSPENSON LARYNGOSCOPY WITH JET VENT, DILATION performed by Adali An MD at [address]  N/A 3/28/2022    SUSPENSION MICROLARYNGOSCOPY WITH BALLOON DILATION AND JET VENT, KENALOG INJECTION performed by Adali An MD at [address] N/A 4/7/2022    Suspension Laryngoscopy  Lateral of Right True Vocal Cord, With Steroid Injection of Larynx And Tracheostomy Tube Change, debridement performed by Chester Shields MD at [address]  N/A 8/10/2022    TRANSORAL LARYNGOPLASTY WITH LEFT PARTIAL ARYTENODECTOMY AND POSS LATERALIZATION, ADVANCEMENT FLAP RECONSTRUCTION WITH JET VENT,THERAPUTIC BRONCHOSCOPY WITH DEBRIEDMENT,WITH BALLOON DILITATION performed by Chester Shields MD at [address]  N/A 8/31/2022    Laryngoscopy with Dilation Debridement  Bronchoscopy with Dilation & Resection of Obstructing Tissues Transoral Laryngoplasty Left True Vocal Fold Lateralization left partial aryteniodectomy performed by Chester Shields MD at [address]      in [address] [place] [address] N/A 4/1/2022    TRACHEOTOMY TUBE REPLACEMENT performed by Adali An MD at [facility]     Current Medications:   Current Facility-Administered Medications: pantoprazole (PROTONIX) tablet 40 mg, 40 mg, Oral, QAM AC  simethicone (MYLICON) chewable tablet 80 mg, 80 mg, Oral, Q6H PRN  traZODone (DESYREL) tablet 100 mg, 100 mg, Oral, Nightly  rivaroxaban (XARELTO) tablet 20 mg, 20 mg, Oral, Daily  multivitamin 1 tablet, 1 tablet, Oral, Daily  ferrous sulfate (IRON 325) tablet 325 mg, 325 mg, Oral, Daily with breakfast  diclofenac sodium (VOLTAREN) 1 % gel 2 g, 2 g, Topical, 4x Daily PRN  albuterol sulfate HFA (PROVENTIL;VENTOLIN;PROAIR) 108 (90 Base) MCG/ACT inhaler 2 puff, 2 puff, Inhalation, Q6H PRN  Menthol lozenge 4.8 mg, 1 lozenge, Oral, Q2H PRN  guaiFENesin (ROBITUSSIN) 100 MG/5ML liquid 400 mg, 400 mg, Oral, BID PRN  rosuvastatin (CRESTOR) tablet 10 mg, 10 mg, Oral, Daily  senna (SENOKOT) tablet 17.2 mg, 2 tablet, Oral, Nightly  sodium chloride flush 0.9 % injection 5-40 mL, 5-40 mL, IntraVENous, 2 times per day  sodium chloride flush 0.9 % injection 5-40 mL, 5-40 mL, IntraVENous, PRN  0.9 % sodium chloride infusion, , IntraVENous, PRN  potassium chloride (KLOR-CON M) extended release tablet 40 mEq, 40 mEq, Oral, PRN **OR** potassium bicarb-citric acid (EFFER-K) effervescent tablet 40 mEq, 40 mEq, Oral, PRN **OR** potassium chloride 10 mEq/100 mL IVPB (Peripheral Line), 10 mEq, IntraVENous, PRN  magnesium sulfate 2000 mg in 50 mL IVPB premix, 2,000 mg, IntraVENous, PRN  furosemide (LASIX) tablet 20 mg, 20 mg, Oral, Daily  glucose chewable tablet 16 g, 4 tablet, Oral, PRN  dextrose bolus 10% 125 mL, 125 mL, IntraVENous, PRN **OR** dextrose bolus 10% 250 mL, 250 mL, IntraVENous, PRN  glucagon (rDNA) injection 1 mg, 1 mg, SubCUTAneous, PRN  dextrose 10 % infusion, , IntraVENous, Continuous PRN  lidocaine 4 % external patch 2 patch, 2 patch, TransDERmal, Daily  insulin glargine (LANTUS) injection vial 28 Units, 28 Units, SubCUTAneous, Nightly  acetaminophen (TYLENOL) tablet 650 mg, 650 mg, Oral, Q4H PRN  acetaminophen (TYLENOL) tablet 650 mg, 650 mg, Oral, Q6H  oxyCODONE (ROXICODONE) immediate release tablet 5 mg, 5 mg, Oral, Q4H PRN **OR** oxyCODONE (ROXICODONE) immediate release tablet 2.5 mg, 2.5 mg, Oral, Q4H PRN  docusate sodium (COLACE) capsule 100 mg, 100 mg, Oral, BID  bisacodyl (DULCOLAX) suppository 10 mg, 10 mg, Rectal, Daily PRN  magnesium hydroxide (MILK OF MAGNESIA) 400 MG/5ML suspension 15 mL, 15 mL, Oral, Daily PRN  ondansetron (ZOFRAN-ODT) disintegrating tablet 4 mg, 4 mg, Oral, Q8H PRN  polyethylene glycol (GLYCOLAX) packet 17 g, 17 g, Oral, Daily PRN  aspirin chewable tablet 81 mg, 81 mg, Oral, Daily  docusate sodium (ENEMEEZ) enema 283 mg, 1 enema, Rectal, Daily  folic acid (FOLVITE) tablet 1 mg, 1 mg, Oral, Daily  lactulose (CHRONULAC) 10 GM/15ML solution 20 g, 20 g, Oral, TID PRN  metoprolol succinate (TOPROL XL) extended release tablet 25 mg, 25 mg, Oral, Daily  naloxegol (MOVANTIK) tablet 12.5 mg, 12.5 mg, Oral, QAM AC  polyethylene glycol (GLYCOLAX) packet 17 g, 17 g, Oral, Daily  melatonin tablet 6 mg, 6 mg, Oral, Nightly  QUEtiapine (SEROQUEL) tablet 25 mg, 25 mg, Oral, Nightly  miconazole (MICOTIN) 2 % powder, , Topical, BID  Allergies:  Metformin and related, Codeine, Meperidine hcl, Sulfa antibiotics, and Sumatriptan    Social History:   MARITAL STATUS:      Family History:       Problem Relation Age of Onset    Parkinson's Disease Father     Lung Cancer Father      REVIEW OF SYSTEMS:    CONSTITUTIONAL:  positive for  fatigue  EYES:  positive for  glasses  HEENT:  negative for  epistaxis  RESPIRATORY:  negative for  hemoptysis  CARDIOVASCULAR:  negative for  chest pain  GASTROINTESTINAL:  negative for diarrhea  GENITOURINARY:  negative for dysuria and hesitancy  INTEGUMENT/BREAST:  negative for rash  HEMATOLOGIC/LYMPHATIC:  negative for petechiae  ALLERGIC/IMMUNOLOGIC:  negative for anaphylaxis  ENDOCRINE:  negative for diabetic symptoms including polyuria  MUSCULOSKELETAL:  positive for  myalgias, arthralgias, and muscle weakness  NEUROLOGICAL:  positive for coordination problems, gait problems, and weakness  BEHAVIOR/PSYCH:  negative  PHYSICAL EXAM:      Vitals:    /63   Pulse 84   Temp 98.2 °F (36.8 °C) (Oral)   Resp 16   Ht 5' 2\" (1.575 m)   Wt 230 lb 6.1 oz (104.5 kg)   LMP  (LMP Unknown)   SpO2 99%   BMI 42.14 kg/m²     Well developed well nourished white female who is awake alert and cooperative  Skin atrophic  Membranes moist  Head normocephalic  Neck without mass  Chest symmetrical expansion  Heart S1S2 without murmur  Lungs CTA  Abd soft, non tender, normoactive BS and no mass  Ext without edema  Neuro weak  Psy pleasant    IMPRESSION/RECOMMENDATIONS:      Patient Active Problem List   Diagnosis Code    COVID-19 U07.1    Hypoxia R09.02    Acute respiratory failure with hypoxia ([facility]) J96.01    Debility R53.81    Physical deconditioning R53.81    History of COVID-19 Z86.16    Dysarthria R47.1    Essential hypertension I10    Type 2 diabetes mellitus with insulin therapy ([facility]) E11.9, Z79.4    Morbid obesity with BMI of 40.0-44.9, adult ([facility]) E66.01, Z68.41    History of coronary artery disease Z86.79    History of coronary artery stent placement Z95.5    Cardiomyopathy ([facility]) I42.9    Critical illness myopathy G72.81    Stridor R06.1    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    Dyspnea and respiratory abnormalities R06.00, R06.89    Acute respiratory failure ([facility]) J96.00    Acute hypoxemic respiratory failure ([facility]) J96.01    Tracheostomy in place [facility]) Z93.0    Hoarseness R49.0    Unstable angina ([facility]) I20.0    Chronic respiratory failure with hypoxia ([facility]) J96.11    Acute chest pain R07.9    Coronary artery disease involving native heart I25.10    Acute on chronic anemia D64.9    Anxiety disorder F41.9    Gastroesophageal reflux disease K21.9    Other tracheostomy complication ([facility] Q52.17    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    S/P tracheoplasty Z98.890    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 ([facility]) E08.59    Elevated serum creatinine R79.89    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    Airway compromise J98.8    Thrombocytopenia, unspecified ([facility]) D69.6    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    Pulmonary embolism on right ([facility]) I26.99    Nausea and vomiting R11.2    Abdominal bloating R14.0    Deep vein thrombosis (DVT) of both lower extremities ([facility]) I82.403    Hypotension I95.9    Sinus tachycardia R00.0    Leukocytosis D72.829 Elevated troponin R77.8    Bilateral leg edema R60.0    Hyperlipidemia E78.5    PE (pulmonary thromboembolism) ([facility]) I26.99    Acute heart failure, unspecified heart failure type ([facility] I50.9    Leg swelling M79.89    Closed displaced intertrochanteric fracture of right femur ([facility]) S72.141A    Chronic bilateral low back pain without sciatica M54.50, G89.29    Compression of lumbar vertebra ([facility]) S32.000A    Closed intertrochanteric fracture, right, initial encounter ([facility] S72.141A    Anemia associated with acute blood loss D62    Osteoporosis of lumbar spine M81.0

## 2023-02-14 NOTE — PROGRESS NOTES
[facility] PHYSICAL THERAPY  DAILY NOTE  [address] [facility],[address] - [id number]    Time In: 1330  Time Out: 1400  Timed Code Treatment Minutes: 30 Minutes  Minutes: 30          Date: 2023  Patient Name: Ton Rayo,  Gender:  female        MRN: [id number]  : 1950  (81 y.o.)     Referring Practitioner: Mansoor Ga MD  Diagnosis: closed intertrochanteric fx, Rt  Additional Pertinent Hx: Polina Alonso is a 73yoF with PMH of recent PE and b/l DVT, macrocytic anemia, CAD, DM2, tracheostomy dependent, obesity, and HTN who presents for leg swelling, nausea, and constipation. She was recently discharged after an 8 day admission for DVTs and PEs. She was placed on Xarelto when discharged and stated that she has been compliant and only missed one dose. She returned due to chronic constipation, feeling ''bloated'', nausea, and LE edema. While being evaluated in the ED the pt had a mechanical fall and developed a R femur fracture. She was found to have supratheraputic INR at 3.83. CT head and C-spine both clear. Pt is s/p R femur ORIF by Dr Gary Adame . Pt found to have acute fractures of L2-S1 likely 2/2 osteoporosis, ok for activity as tolerated per Dr Curlee Kehr . Prior Level of Function:  Lives With: Spouse  Type of Home: House  Home Layout: One level  Home Access: Ramped entrance (or 1 step with no rails.)  Home Equipment: Laloet Nithin, rolling, Lift chair (Pt sleeps in lift chair and was using it to assist up to stand PTA)   Bathroom Shower/Tub: Walk-in shower  Bathroom Toilet: Standard  Bathroom Equipment: Built-in shower seat, 3-in-1 commode    ADL Assistance: Needs assistance  Homemaking Assistance: Needs assistance  Ambulation Assistance: Independent  Transfer Assistance: Independent  Active : No  Additional Comments: pt amb short distances in the home with RW,  available / and able to assist as needed.  Has a passi valve that she uses at rest.    Restrictions/Precautions:  Restrictions/Precautions: General Precautions, Fall Risk, Weight Bearing  Right Lower Extremity Weight Bearing: Weight Bearing As Tolerated  Position Activity Restriction  Other position/activity restrictions: Tracheostomy/collar 6L. With venturi mask for out of room use 8 liters portable O2. SUBJECTIVE: Pt seated in recliner. Pleasant and cooperative. Therapist asked for pt to use the passe Janeth valve during the session for improved communication. Pt complied. PAIN: 0/10: at rest.  Min c/o RLE pain with ex    Vitals: Vitals not assessed per clinical judgement, see nursing flowsheet    OBJECTIVE:  Bed Mobility:  Not Tested    Transfers:  Not Tested      Exercise:    Exercises were completed for increased independence with functional mobility. Seated- margarita scapular retraction, margarita hip ADD isometrics, margarita long arc quads with min assist RLE to gain and maintain TKE, glute and margarita quad sets with manual cues RLE, ham curls with t-band resistance BLEs, though little pull noted by pt with RLE, margarita ankle pumps with emphasis on df, BUE diagonals to ea direction with erect trunk, abdominal isometrics x10 reps ea. Pt required rest breaks x3 during the ex. Functional Outcome Measures: Not completed       ASSESSMENT:  Assessment: Patient progressing toward established goals. Activity Tolerance:  Patient tolerance of  treatment: good.       Equipment Recommendations:Equipment Needed: No  Other: Pt has RW, manual w/c and mechanical lift device  Discharge Recommendations: Continue to assess pending progress and Patient would benefit from continued PT at discharge  Plan: Current Treatment Recommendations: Strengthening, Balance training, Functional mobility training, Transfer training, Endurance training, Equipment evaluation, education, & procurement, Patient/Caregiver education & training, Safety education & training, Therapeutic activities, Home exercise program, Wheelchair mobility training, Gait training, Pain management  General Plan:  (5x/ wk 90 min)    Patient Education  Patient Education: Home Exercise Program,  - Patient Verbalized Understanding, - Patient Requires Continued Education    Goals:  Patient Goals : get around o my own without the RW  Short Term Goals  Time Frame for Short Term Goals: 1 wk  Short Term Goal 1: Pt will go from supine <->sit, mod +1 to get in/out of bed. Short Term Goal 2: Pt will get up/down from bed, into JEANNETTE stedy device, +1 mod assist to progress to up to RW  Short Term Goal 3: Pt will  the JEANNETTE stedy device, +1 min assist x5 min, to progress to standing with RW  Short Term Goal 4: Pt will propel w/c >= 50 ft, tile surface, CGA for home mobility  Long Term Goals  Time Frame for Long Term Goals : 3 wks from evaluation. Long Term Goal 1: Pt to go supine <->sit, min +1 to get in/out of bed. Long Term Goal 2: Pt to get up/down from bed or w/c +1 min assist to get up to walk. Long Term Goal 3: Pt to perform stand/pivot transfer with RW, +1 min assist to get in/out of w/c. Long Term Goal 4: Pt to walk with RW >= 10 ft, mn +1 to progress to home mobility  Long Term Goal 5: Pt to propel w/c >= 50 ft, Mod I, for home mobility  Additional Goals?: Yes  Long term goal 6: Pt to get in/out of car, min +1 for transportation needs. Following session, patient left in safe position with all fall risk precautions in place.

## 2023-02-14 NOTE — PROGRESS NOTES
[facility]      Date:  2/14/2023            Patient Name: Manon Dakin           MRN: [id number]  Kendrick: [de-identified]          YOB: 1950 (71 y.o.)       Gender: female   Diagnosis: closed intertrochanteric fracture, right  Physician: Referring Practitioner: Aminta Silverio MD    REASON FOR MISSED TREATMENT:  Attempted to evaluate pt for RT x2 and not available-will try again tomorrow   [facility], [address] 2/14/2023

## 2023-02-14 NOTE — PROGRESS NOTES
Pt reports some bladder spasm overnight  Nurse deflated balloon, inserted to hub, reinflate balloon and hand irrigated  Werner is draining better now  Still with limited mobility, keep werner in for now

## 2023-02-14 NOTE — PLAN OF CARE
Patient is being encouraged to drink more fluids. Urine output was 175ml. She is also urinating around the catheter.

## 2023-02-14 NOTE — PROGRESS NOTES
[facility]  Inpatient Rehabilitation  Occupational Therapy  Progress Note  Time:  Time In: 0700  Time Out: 0800  Timed Code Treatment Minutes: 60 Minutes  Minutes: 60          Date: 2023  Patient Name: Silvia Jose,   Gender: female      Room: -55/055-A  MRN: [id number]  : 1950  (81 y.o.)  Referring Practitioner: Delfino Hollis MD  Diagnosis: closed intertrochanteric fracture, right  Additional Pertinent Hx: The patient was recently hospitalized from 2023 to 2023 initially for abdominal bloating, nausea and leg swelling. She was found to have elevated D-dimer. Subsequent testing revealed right lower lobe pulmonary embolism and right lower extremity DVT. She was at initially treated with IV heparin and later transition to [address] [place] [address]. The patient was brought to ER on 2022 because of progressively increased leg swelling, and nausea/vomiting associated with decreased oral intake. She was found to have BNP of 256.7 and Bumex was prescribed. She then had a fall accident  when she was going to toilet while she was in ER on 2022. The fall resulted severe right hip pain. X-ray of right hip and pelvis revealed acute right femoral intertrochanteric fracture with subtrochanteric extension. Orthopedic service was consulted. Xarelto was held in preparation for surgical management. Cardiology was consulted on 2023 for elevated BNP and Lasix was started. Because of difficulty voiding with urinary retention, urology was also consulted on 2023. Urology service placed Wen with some difficulty on 2023. Therefore Wen was kept in place. On 2/3/2023 the patient underwent open treatment of right intertrochanteric fracture with cephalomedullary nail by Dr. Yvan Nicole. She is allowed weightbearing as tolerated at the right lower extremity postoperatively.     Restrictions/Precautions:  Restrictions/Precautions: General Precautions, Fall Risk, Weight Bearing  Right Lower Extremity Weight Bearing: Weight Bearing As Tolerated  Position Activity Restriction  Other position/activity restrictions: Tracheostomy/collar 6L. With venturi mask for out of room use 8 liters portable O2. SUBJECTIVE: Patient lying in bed upon verbalizing she had a \"bad\" night due to complications with catheter (urine is leaking around catheter). Patient agreeable to OT session    PAIN: 8/10: In R hip while seated, 10/10 with movement in R hip. Nurse notified     Vitals: Oxygen: Fluctuated between % on 6L via trach mask   Blood Pressure: 124/69 (taken after feeling light headed)   Blood Sugar (taken by nurse aide)- [address] and nurse notified of patient feeling light headed    COGNITION: Slow Processing, Decreased Problem Solving, and Decreased Safety Awareness    ADL:   Grooming: with set-up. For hair care seated in chair  Bathing: Maximum Assistance. To wash flaquito area/bottom while lying supine in bed. Remaining areas completed prior to arrival of therapist. Chas Whitmore onto side in bed to complete bottom  Upper Extremity Dressing: Minimal Assistance. [facility] gown and don dress seated in chair with increased time provided  Lower Extremity Dressing: Dependent. Threading LEs into undergarment, rolled side to side in bed with increased time to pull up over hips with assist provided from therapist  Footwear Management: Dependent. To don B socks   Toileting: Dependent. For hygiene after incontinence of urine (patient having complications with catheter). BALANCE:  Sitting Balance:  Contact Guard Assistance. - SBA seated EOB  Standing Balance: Minimal Assistance.  - CGA standing in jose stedy with BUE support on support bar    BED MOBILITY:  Rolling to Left: Maximum Assistance +1 with increased time cues for technique, use of side rail  Rolling to Right: Maximum Assistance +1 with increased time, use of side rail, cues for technique  Supine to Sit: Maximum Assistance +1 with cues provided for technique completing at slow pace    TRANSFERS:  Sit to Stand: Moderate Assistance. +1 and Oscar+1 from slightly elevated EOB using jose stedy with increased time, cues for technique  Stand to Sit: Moderate Assistance. +1 and Oscar+1 to bedside chair using jose stedy with 1 cue to control descend  Transferred from EOB to bedside chair with use of jose stedy     ADDITIONAL ACTIVITIES:  Patient completed BUE strengthening exercises with skilled education on HEP: completed x15 reps x1 set with a min resistance band in all joints and all planes in order to improve UE strength and activity tolerance required for BADL routine and toilet / shower transfers. Patient tolerated fair, requiring rest breaks. Patient also required cues for technique. During exercises patient complained of feeling light headed. Obtained vitals, noted above. Nurse and Doctor Nayeli Farmer notified. ASSESSMENT:  Activity Tolerance:  Patient tolerance of  treatment: fair. Assessment: Trish Felder is making slow steady progress towards therapy goals during short stay on IPR thus far. She has made progression in UB dressing with ability to complete with Oscar. She continues to require maxA for LB dressing/bathing tasks. Throughout the night/this AM patient has been having complications with catheter, urine going around catheter tubing. Trish Felder is completing functional transfers with use of jose stedy fluctuating between modA+2 to modA+1 and CGA+1 with increased time and cues for technique. She continues to exhibit decreased strength, activity tolerance, increase of pain in RLE with movement, increased need for assist during BADL routine which limits her from meeting her goals. Trish Felder is typically independent with BADL routine with SBA from  for safety measures. She is currently using 6L via trach mask.  Pt continues to require skilled OT intervention to improve strength, activity tolerance, safety awareness, ADL/IADL performance required for pt to return home at [facility]. Without skilled services patient is at risk for falls, decrease in overall function and increased caregiver burden. Discharge Recommendations: Continue to assess pending progress. Patient would benefit from continued therapy after discharged. Equipment Recommendations: Other: Monitor pending progress  Plan: Times Per Week: 5x per week for 90 minutes  Times Per Day: Once a day  Current Treatment Recommendations: Balance training, Functional mobility training, Endurance training, Self-Care / ADL, Safety education & training, Patient/Caregiver education & training, Strengthening    Patient Education  Patient Education:  safety with transfers, ADl strategies, BUE exercises     Goals  Short Term Goals  Time Frame for Short Term Goals: until discharge  Short Term Goal 1: Pt will tolerate 12-15 min EOB ADL task with S to improve trunk control and activity tolerance required for EOB ADLs. _ goal not met, continue   Short Term Goal 2: Pt will complete sit-stand t/fs with mod A x 1 with minimal cues of technique to progress towards BSC t/fs- goal not met, continue   Short Term Goal 3: Pt will tolerate standing 1 min with min A for increased ease of clothing management nad LB bathing routine- goal not met, continue   Short Term Goal 4: Pt will tolerate further assessment of functional t/fs by OTR when appropriate- goal not met, continue   Long Term Goals  Time Frame for Long Term Goals : until discharge  Long Term Goal 1: Pt will complete sit-stand t/fs with SBA x 1 with minimal cues of technique to progress towards BSC t/fs- goal not met, continue  Long Term Goal 2: Pt will perform UB/LB dressing and bathing with Min A and minimal cues for ECT to improve functional independence. - goal not met, continue     Following session, patient left in safe position with all fall risk precautions in place.

## 2023-02-14 NOTE — PROGRESS NOTES
[facility] Continence Nurse  Progress Note       Clint Johnson  AGE: 81 y.o. GENDER: female  : 1950  UNIT: 7E-55/055-A  TODAY'S DATE:  2023  ADMISSION DATE: 2/10/2023  2:59 PM  Subjective:     Reason for [facility] Evaluation and Assessment: buttocks wound      Clint Johnson is a 81 y.o. female referred by:   [] Physician/PA/APRN  [x] Nursing  [] Other:     Wound Identification:  Wound Type:  MASD  Contributing Factors: incontinence of stool    Objective:     Lázaro Risk Score: Lázaro Scale Score: 12    Assessment:     Encounter: Present to pt room for assessment of bilateral buttocks. Pt assisted to lean forward in chair per nurse for assessment. Pt appears to have blanching redness to buttocks with no open wounds. Recommend staff to continue triad bilateral buttocks, keeping off depends and using moisture wicking chux pads. Pt has waffle cushion in chair. Pt has werner catheter in place. Call wound ostomy as needed. Call light in reach. Bilateral buttocks blanching redness from MASD, no open wounds      Response to treatment:  Well tolerated by patient. Plan:     Treatment Recommendations:   Bilateral buttock redness: Cleanse skin and redness with normal saline or wound cleanser and gauze. Pat dry with clean gauze. Apply Triad barrier cream to wounds twice daily and PRN. If cream becomes soiled, wipe off top layer and reapply.        Specialty Bed Required :   [x] Low Air Loss   [x] Pressure Redistribution  [] Fluid Immersion- Dolphin  [] Bariatric  [] RotoProne   [] Other:     Discharge Plan:  Placement for patient upon discharge: unknown  Patient appropriate for [address]: n/a

## 2023-02-15 LAB
ANION GAP SERPL CALC-SCNC: 10 MEQ/L (ref 8–16)
BACTERIA UR CULT: ABNORMAL
BASOPHILS ABSOLUTE: 0 THOU/MM3 (ref 0–0.1)
BASOPHILS NFR BLD AUTO: 0.3 %
BUN SERPL-MCNC: 8 MG/DL (ref 7–22)
CALCIUM SERPL-MCNC: 8.7 MG/DL (ref 8.5–10.5)
CHLORIDE SERPL-SCNC: 104 MEQ/L (ref 98–111)
CO2 SERPL-SCNC: 26 MEQ/L (ref 23–33)
CREAT SERPL-MCNC: 0.5 MG/DL (ref 0.4–1.2)
DEPRECATED RDW RBC AUTO: 58.8 FL (ref 35–45)
EOSINOPHIL NFR BLD AUTO: 1.3 %
EOSINOPHILS ABSOLUTE: 0.1 THOU/MM3 (ref 0–0.4)
ERYTHROCYTE [DISTWIDTH] IN BLOOD BY AUTOMATED COUNT: 16.9 % (ref 11.5–14.5)
GFR SERPL CREATININE-BSD FRML MDRD: > 60 ML/MIN/1.73M2
GLUCOSE BLD STRIP.AUTO-MCNC: 116 MG/DL (ref 70–108)
GLUCOSE SERPL-MCNC: 142 MG/DL (ref 70–108)
HCT VFR BLD AUTO: 28.7 % (ref 37–47)
HGB BLD-MCNC: 8.8 GM/DL (ref 12–16)
IMM GRANULOCYTES # BLD AUTO: 0.17 THOU/MM3 (ref 0–0.07)
IMM GRANULOCYTES NFR BLD AUTO: 1.9 %
LYMPHOCYTES ABSOLUTE: 1.5 THOU/MM3 (ref 1–4.8)
LYMPHOCYTES NFR BLD AUTO: 17 %
MCH RBC QN AUTO: 29.7 PG (ref 26–33)
MCHC RBC AUTO-ENTMCNC: 30.7 GM/DL (ref 32.2–35.5)
MCV RBC AUTO: 97 FL (ref 81–99)
MONOCYTES ABSOLUTE: 0.8 THOU/MM3 (ref 0.4–1.3)
MONOCYTES NFR BLD AUTO: 8.8 %
NEUTROPHILS NFR BLD AUTO: 70.7 %
NRBC BLD AUTO-RTO: 0 /100 WBC
ORGANISM: ABNORMAL
PLATELET # BLD AUTO: 338 THOU/MM3 (ref 130–400)
PMV BLD AUTO: 9.4 FL (ref 9.4–12.4)
POTASSIUM SERPL-SCNC: 3.7 MEQ/L (ref 3.5–5.2)
RBC # BLD AUTO: 2.96 MILL/MM3 (ref 4.2–5.4)
SEGMENTED NEUTROPHILS ABSOLUTE COUNT: 6.4 THOU/MM3 (ref 1.8–7.7)
SODIUM SERPL-SCNC: 140 MEQ/L (ref 135–145)
WBC # BLD AUTO: 9 THOU/MM3 (ref 4.8–10.8)

## 2023-02-15 PROCEDURE — 6370000000 HC RX 637 (ALT 250 FOR IP): Performed by: PHYSICAL MEDICINE & REHABILITATION

## 2023-02-15 PROCEDURE — 97530 THERAPEUTIC ACTIVITIES: CPT

## 2023-02-15 PROCEDURE — 99232 SBSQ HOSP IP/OBS MODERATE 35: CPT | Performed by: PHYSICAL MEDICINE & REHABILITATION

## 2023-02-15 PROCEDURE — 83690 ASSAY OF LIPASE: CPT

## 2023-02-15 PROCEDURE — 97130 THER IVNTJ EA ADDL 15 MIN: CPT

## 2023-02-15 PROCEDURE — 97129 THER IVNTJ 1ST 15 MIN: CPT

## 2023-02-15 PROCEDURE — 85025 COMPLETE CBC W/AUTO DIFF WBC: CPT

## 2023-02-15 PROCEDURE — 1180000000 HC REHAB R&B

## 2023-02-15 PROCEDURE — 80076 HEPATIC FUNCTION PANEL: CPT

## 2023-02-15 PROCEDURE — 80048 BASIC METABOLIC PNL TOTAL CA: CPT

## 2023-02-15 PROCEDURE — 6370000000 HC RX 637 (ALT 250 FOR IP): Performed by: NURSE PRACTITIONER

## 2023-02-15 PROCEDURE — 94760 N-INVAS EAR/PLS OXIMETRY 1: CPT

## 2023-02-15 PROCEDURE — 97110 THERAPEUTIC EXERCISES: CPT

## 2023-02-15 PROCEDURE — 2700000000 HC OXYGEN THERAPY PER DAY

## 2023-02-15 PROCEDURE — 82948 REAGENT STRIP/BLOOD GLUCOSE: CPT

## 2023-02-15 PROCEDURE — 36415 COLL VENOUS BLD VENIPUNCTURE: CPT

## 2023-02-15 PROCEDURE — 2580000003 HC RX 258: Performed by: PHYSICAL MEDICINE & REHABILITATION

## 2023-02-15 PROCEDURE — 36592 COLLECT BLOOD FROM PICC: CPT

## 2023-02-15 RX ORDER — FLUCONAZOLE 200 MG/1
200 TABLET ORAL DAILY
Status: COMPLETED | OUTPATIENT
Start: 2023-02-15 | End: 2023-02-28

## 2023-02-15 RX ORDER — INSULIN GLARGINE 100 [IU]/ML
14 INJECTION, SOLUTION SUBCUTANEOUS ONCE
Status: COMPLETED | OUTPATIENT
Start: 2023-02-16 | End: 2023-02-16

## 2023-02-15 RX ORDER — NITROFURANTOIN 25; 75 MG/1; MG/1
100 CAPSULE ORAL EVERY 12 HOURS SCHEDULED
Status: DISCONTINUED | OUTPATIENT
Start: 2023-02-15 | End: 2023-02-15

## 2023-02-15 RX ADMIN — Medication 6 MG: at 22:48

## 2023-02-15 RX ADMIN — NALOXEGOL OXALATE 12.5 MG: 12.5 TABLET, FILM COATED ORAL at 05:36

## 2023-02-15 RX ADMIN — ACETAMINOPHEN 650 MG: 325 TABLET ORAL at 08:36

## 2023-02-15 RX ADMIN — FERROUS SULFATE TAB 325 MG (65 MG ELEMENTAL FE) 325 MG: 325 (65 FE) TAB at 08:32

## 2023-02-15 RX ADMIN — FUROSEMIDE 20 MG: 20 TABLET ORAL at 08:32

## 2023-02-15 RX ADMIN — FOLIC ACID 1 MG: 1 TABLET ORAL at 08:32

## 2023-02-15 RX ADMIN — ROSUVASTATIN 10 MG: 10 TABLET, FILM COATED ORAL at 08:33

## 2023-02-15 RX ADMIN — DOCUSATE SODIUM 100 MG: 100 CAPSULE, LIQUID FILLED ORAL at 08:32

## 2023-02-15 RX ADMIN — PANTOPRAZOLE SODIUM 40 MG: 40 TABLET, DELAYED RELEASE ORAL at 05:36

## 2023-02-15 RX ADMIN — DOCUSATE SODIUM 100 MG: 100 CAPSULE, LIQUID FILLED ORAL at 22:46

## 2023-02-15 RX ADMIN — SENNOSIDES 17.2 MG: 8.6 TABLET, FILM COATED ORAL at 22:57

## 2023-02-15 RX ADMIN — FLUCONAZOLE 200 MG: 200 TABLET ORAL at 22:47

## 2023-02-15 RX ADMIN — TRAMADOL HYDROCHLORIDE 100 MG: 50 TABLET, COATED ORAL at 22:48

## 2023-02-15 RX ADMIN — MICONAZOLE NITRATE: 20 POWDER TOPICAL at 23:16

## 2023-02-15 RX ADMIN — TRAMADOL HYDROCHLORIDE 100 MG: 50 TABLET, COATED ORAL at 05:36

## 2023-02-15 RX ADMIN — NITROFURANTOIN MONOHYDRATE/MACROCRYSTALLINE 100 MG: 25; 75 CAPSULE ORAL at 11:22

## 2023-02-15 RX ADMIN — ACETAMINOPHEN 650 MG: 325 TABLET ORAL at 22:46

## 2023-02-15 RX ADMIN — ACETAMINOPHEN 650 MG: 325 TABLET ORAL at 17:21

## 2023-02-15 RX ADMIN — QUETIAPINE FUMARATE 25 MG: 25 TABLET ORAL at 22:46

## 2023-02-15 RX ADMIN — TRAMADOL HYDROCHLORIDE 100 MG: 50 TABLET, COATED ORAL at 11:32

## 2023-02-15 RX ADMIN — SODIUM CHLORIDE, PRESERVATIVE FREE 10 ML: 5 INJECTION INTRAVENOUS at 23:15

## 2023-02-15 RX ADMIN — ASPIRIN 81 MG 81 MG: 81 TABLET ORAL at 08:32

## 2023-02-15 RX ADMIN — SODIUM CHLORIDE, PRESERVATIVE FREE 10 ML: 5 INJECTION INTRAVENOUS at 08:38

## 2023-02-15 RX ADMIN — Medication 1 TABLET: at 08:32

## 2023-02-15 RX ADMIN — TRAZODONE HYDROCHLORIDE 100 MG: 100 TABLET ORAL at 22:46

## 2023-02-15 RX ADMIN — RIVAROXABAN 20 MG: 20 TABLET, FILM COATED ORAL at 17:21

## 2023-02-15 RX ADMIN — MICONAZOLE NITRATE: 20 POWDER TOPICAL at 08:37

## 2023-02-15 ASSESSMENT — PAIN - FUNCTIONAL ASSESSMENT
PAIN_FUNCTIONAL_ASSESSMENT: ACTIVITIES ARE NOT PREVENTED
PAIN_FUNCTIONAL_ASSESSMENT: ACTIVITIES ARE NOT PREVENTED
PAIN_FUNCTIONAL_ASSESSMENT: PREVENTS OR INTERFERES SOME ACTIVE ACTIVITIES AND ADLS

## 2023-02-15 ASSESSMENT — PAIN SCALES - GENERAL
PAINLEVEL_OUTOF10: 7
PAINLEVEL_OUTOF10: 8
PAINLEVEL_OUTOF10: 7
PAINLEVEL_OUTOF10: 7

## 2023-02-15 ASSESSMENT — PAIN DESCRIPTION - DESCRIPTORS
DESCRIPTORS: ACHING;SPASM
DESCRIPTORS: ACHING
DESCRIPTORS: ACHING;SHARP
DESCRIPTORS: ACHING
DESCRIPTORS: ACHING

## 2023-02-15 ASSESSMENT — PAIN DESCRIPTION - ORIENTATION
ORIENTATION: RIGHT

## 2023-02-15 ASSESSMENT — ENCOUNTER SYMPTOMS
CONSTIPATION: 0
RHINORRHEA: 0
COUGH: 0
WHEEZING: 0
ABDOMINAL PAIN: 1
VOMITING: 0
NAUSEA: 0
DIARRHEA: 0
SORE THROAT: 0
TROUBLE SWALLOWING: 0
SHORTNESS OF BREATH: 0

## 2023-02-15 ASSESSMENT — PAIN DESCRIPTION - PAIN TYPE: TYPE: SURGICAL PAIN

## 2023-02-15 ASSESSMENT — PAIN DESCRIPTION - FREQUENCY: FREQUENCY: CONTINUOUS

## 2023-02-15 ASSESSMENT — PAIN DESCRIPTION - LOCATION
LOCATION: HIP
LOCATION: HIP
LOCATION: ABDOMEN;HIP
LOCATION: HIP;BACK
LOCATION: HIP

## 2023-02-15 ASSESSMENT — PAIN DESCRIPTION - ONSET: ONSET: ON-GOING

## 2023-02-15 NOTE — PROGRESS NOTES
[facility]  Recreational Therapy  Inpatient Rehabilitation Evaluation        Time Spent with Patient: 25 minutes    Date:  2/15/2023       Patient Name: Slim Hager      MRN: [id number]       YOB: 1950 (71 y.o.)       Gender: female  Diagnosis: closed intertrochanteric fracture, right  Referring Practitioner: Fransico Brar MD    RESTRICTIONS/PRECAUTIONS:  Restrictions/Precautions: General Precautions, Fall Risk, Weight Bearing     Hearing: Within functional limits    PAIN: 8-hip    SUBJECTIVE:  pt lives with her spouse-he has 3 children and she has 3 children-they have been  32 yrs and  present    VISION:  Within Normal Limit    HEARING: Within Normal Limit    LEISURE INTERESTS:   Pt states she loves flowers-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-she likes to get on facebook and she enjoys [employer]oom[employer] puzzles and has book of puzzles in [address] [place] [address] her grandchildren visit they like to color together -pt pleasant-easily fatigued and towards the end of our time together she started to fall asleep-pt loves her rest -she enjoyed petting our pet therapy dog May this afternoon briefly while working with OT    BARRIERS TO LEISURE INTERESTS:    Decreased endurance, Decreased motivation, Lower extremity weakness, and Pain        Patient Education  New Education Provided: Importance of Leisure, RT Plan of Care    Plan:  Continue to follow patient through this admission  See patient individually    Electronically signed by: LIBBY Eugene  Date: 2/15/2023

## 2023-02-15 NOTE — PROGRESS NOTES
Assessment: This was a spiritual care encounter as a part of rounds. Patient, Clemente Pulse, was oriented and alert, with her son and daughter-in-law at bedside. Patient appeared in good spirits. Interventions:   provided empathic listening and facilitated storytelling with patient about her health situation over the past year.  provided information regarding  services and availability. Outcomes:  Patient shared about her last year of being in and out of the hospital and the recent fall that she had. 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 Patient expressed gratitude for the support of her family during this time. Plan:  Spiritual care team will remain available to support patient and family, PRN. [address] [place], [place]. [address] [place] [address] S[address] [place], [address]  [phone]

## 2023-02-15 NOTE — PROGRESS NOTES
[facility]  INPATIENT PHYSICAL THERAPY  DAILY NOTE  [facility] - 7E-55/055-A    Time In: 07  Time Out: 08  Timed Code Treatment Minutes: 60 Minutes  Minutes: 60          Date: 2/15/2023  Patient Name: Kaleigh Sierra,  Gender:  female        MRN: [id number]  : 1950  (72 y.o.)     Referring Practitioner: LIYAH Smith MD  Diagnosis: closed intertrochanteric fx, Rt  Additional Pertinent Hx: Kaleigh Sierra is a 72yoF with PMH of recent PE and b/l DVT, macrocytic anemia, CAD, DM2, tracheostomy dependent, obesity, and HTN who presents for leg swelling, nausea, and constipation. She was recently discharged after an 8 day admission for DVTs and PEs. She was placed on Xarelto when discharged and stated that she has been compliant and only missed one dose. She returned due to chronic constipation, feeling ''bloated'', nausea, and LE edema. While being evaluated in the ED the pt had a mechanical fall and developed a R femur fracture. She was found to have supratheraputic INR at 3.83. CT head and C-spine both clear.  Pt is s/p R femur ORIF by Dr Aragon .  Pt found to have acute fractures of L2-S1 likely 2/2 osteoporosis, ok for activity as tolerated per Dr Joseph .     Prior Level of Function:  Lives With: Spouse  Type of Home: House  Home Layout: One level  Home Access: Ramped entrance (or 1 step with no rails.)  Home Equipment: Wheelchair-manual, Walker, rolling, Lift chair (Pt sleeps in lift chair and was using it to assist up to stand PTA)   Bathroom Shower/Tub: Walk-in shower  Bathroom Toilet: Standard  Bathroom Equipment: Built-in shower seat, 3-in-1 commode    ADL Assistance: Needs assistance  Homemaking Assistance: Needs assistance  Ambulation Assistance: Independent  Transfer Assistance: Independent  Active : No  Additional Comments: pt amb short distances in the home with RW,  available  and able to assist as needed. Has a passi valve that she uses  at rest.    Restrictions/Precautions:  Restrictions/Precautions: General Precautions, Fall Risk, Weight Bearing  Right Lower Extremity Weight Bearing: Weight Bearing As Tolerated  Position Activity Restriction  Other position/activity restrictions: Tracheostomy/collar 6L. With venturi mask for out of room use 8 liters portable O2. SUBJECTIVE: Patient laying in bed upon arrival and agreeable to therapy. Patient required some encouragement to participate with exercises and stay on task. Noted Patient's catheter leaking, notified nurse. PAIN: 8/10: R hip    Vitals: Vitals not assessed per clinical judgement, see nursing flowsheet    OBJECTIVE:  Bed Mobility:  Supine to Sit: Maximum Assistance, X 2, with head of bed raised, with rail, with verbal cues , with increased time for completion  **Varied between Moderate and Maximum Assistance X1 at LE and Maximum Assistance x1 at trunk, verbal cues for sequencing and direction, cues for hand placement on rail and to push up with elbow to sit EOB  Scooting: Maximum Assistance, X 1, with verbal cues    Transfers:  Sit to Stand:  Moderate Assistance, X 1, with Canda Elaine, with increased time for completion, cues for hand placement, with verbal cues  Stand to Sit:Minimal Assistance, X 2, with Canda Elaine, with increased time for completion, cues for hand placement, with verbal cues  **Maximum Assistance to scoot back in chair for proper positioning with verbal cues    Balance:  Static Sitting Balance: Stand By Assistance to [employer], on EOB in preparation for transfer, cues for posture and scooting so feet are on floor  Static Standing Balance: Contact Guard Assistance, Minimal Assistance, X 1, with verbal cues , with increased time for completion in Canda Elaine with BUE support, extra time for clothing management and pericare, verbal cues for posture and hand placement, Patient able to tolerate ~15 sec static standing with improved posture    Exercise:  Patient was guided in 1 set(s) 10 reps of exercise to both lower extremities. Ankle pumps, Glut sets, Quad sets, Heelslides, Short arc quads, Hip abduction/adduction, and Straight leg raises. Exercises were completed for increased independence with functional mobility. **Required assistance with RLE to complete exercises, verbal cues to stay on task and for proper technique    Functional Outcome Measures: Not completed       ASSESSMENT:  Assessment: Patient progressing toward established goals. Activity Tolerance:  Patient tolerance of  treatment: good      Equipment Recommendations:Equipment Needed: No  Other: Pt has RW, manual w/c and mechanical lift device  Discharge Recommendations: Continue to assess pending progress and Patient would benefit from continued PT at discharge  Plan: Current Treatment Recommendations: Strengthening, Balance training, Functional mobility training, Transfer training, Endurance training, Equipment evaluation, education, & procurement, Patient/Caregiver education & training, Safety education & training, Therapeutic activities, Home exercise program, Wheelchair mobility training, Gait training, Pain management  General Plan:  (5x/ wk 90 min)    Patient Education  Patient Education: Plan of Care, Precautions/Restrictions, Bed Mobility, Transfers, Up in Chair for All Meals, Verbal Exercise Instruction    Goals:  Patient Goals : get around o my own without the RW  Short Term Goals  Time Frame for Short Term Goals: 1 wk  Short Term Goal 1: Pt will go from supine <->sit, mod +1 to get in/out of bed.   Short Term Goal 2: Pt will get up/down from bed, into JEANNETTE stedy device, +1 mod assist to progress to up to RW  Short Term Goal 3: Pt will  the JEANNETTE stedy device, +1 min assist x5 min, to progress to standing with RW  Short Term Goal 4: Pt will propel w/c >= 50 ft, tile surface, CGA for home mobility  Long Term Goals  Time Frame for Long Term Goals : 3 wks from evaluation. Long Term Goal 1: Pt to go supine <->sit, min +1 to get in/out of bed. Long Term Goal 2: Pt to get up/down from bed or w/c +1 min assist to get up to walk. Long Term Goal 3: Pt to perform stand/pivot transfer with RW, +1 min assist to get in/out of w/c. Long Term Goal 4: Pt to walk with RW >= 10 ft, mn +1 to progress to home mobility  Long Term Goal 5: Pt to propel w/c >= 50 ft, Mod I, for home mobility  Additional Goals?: Yes  Long term goal 6: Pt to get in/out of car, min +1 for transportation needs. Following session, patient left in safe position with all fall risk precautions in place. Therapy session performed by Enriqueta BOSCH under supervision of credentialed therapist Yahs Murray PTA.

## 2023-02-15 NOTE — PROGRESS NOTES
[address] [address]  Recreational Therapy  Daily Note  [address]    Time Spent with Patient: 10 minutes    Date:  2/15/2023       Patient Name: Adrian Radford      MRN: [id number]      YOB: 1950 (71 y.o.)       Gender: female  Diagnosis: closed intertrochanteric fracture, right  Referring Practitioner: Poncho Steiner MD    RESTRICTIONS/PRECAUTIONS:  Restrictions/Precautions: General Precautions, Fall Risk, Weight Bearing     Hearing: Within functional limits    PAIN: 0    SUBJECTIVE:  ok     OBJECTIVE:  Pt would like to get her hair washed and styled by our beautician tomorrow-pt is too fatigued this am for RT evaluation-will attempt later as tolerated       Patient Education  New Education Provided: Importance of Leisure,     Electronically signed by: LIBBY Sigala  Date: 2/15/2023

## 2023-02-15 NOTE — PROGRESS NOTES
[address]  Occupational Therapy  Daily Note  Time:   Time In: 1400  Time Out: 1430  Timed Code Treatment Minutes: 30 Minutes  Minutes: 30    Date: 2/15/2023  Patient Name: Gweneth Boas,   Gender: female      Room: [facility]55/055-A  MRN: [id number]  : 1950  (81 y.o.)  Referring Practitioner: Catrachita Vivas MD  Diagnosis: closed intertrochanteric fracture, right  Additional Pertinent Hx: The patient was recently hospitalized from 2023 to 2023 initially for abdominal bloating, nausea and leg swelling. She was found to have elevated D-dimer. Subsequent testing revealed right lower lobe pulmonary embolism and right lower extremity DVT. She was at initially treated with IV heparin and later transition to [address] [place] [address]. The patient was brought to ER on 2022 because of progressively increased leg swelling, and nausea/vomiting associated with decreased oral intake. She was found to have BNP of 256.7 and Bumex was prescribed. She then had a fall accident  when she was going to toilet while she was in ER on 2022. The fall resulted severe right hip pain. X-ray of right hip and pelvis revealed acute right femoral intertrochanteric fracture with subtrochanteric extension. Orthopedic service was consulted. Xarelto was held in preparation for surgical management. Cardiology was consulted on 2023 for elevated BNP and Lasix was started. Because of difficulty voiding with urinary retention, urology was also consulted on 2023. Urology service placed Wen with some difficulty on 2023. Therefore Wen was kept in place. On 2/3/2023 the patient underwent open treatment of right intertrochanteric fracture with cephalomedullary nail by Dr. Lucho Thurman. She is allowed weightbearing as tolerated at the right lower extremity postoperatively.     Restrictions/Precautions:  Restrictions/Precautions: General Precautions, Fall Risk, Weight Bearing  Right Lower Extremity Weight Bearing: Weight Bearing As Tolerated  Position Activity Restriction  Other position/activity restrictions: Tracheostomy/collar 6L. With venturi mask for out of room use 8 liters portable O2. SUBJECTIVE: Patient pleasant and cooperative. Fatigued, frequently falling asleep during and immediately after session. PAIN: C/o R hip pain, doesn't rate, ice applied at EOS      Vitals: 02: 99%, 4L via trach mask     COGNITION: WFL    ADL:   No ADL's completed this session. BALANCE:  Sitting Balance:  Modified Independent. Standing Balance: Contact Guard Assistance. In [facility]     BED MOBILITY:  Sit to Supine: Moderate Assistance A with BLEs. TRANSFERS:  Sit to Stand:  Minimal  Assistance, X 1, with Margeret Handler, from recliner. (Can achieve CGA from [address] , however is noted pt comes forward flexed and compensates with B forearms vs coming fully erect)   Stand to Sit: Minimal Assistance, X 1, with Margeret Handler. With cues to control descend to EOB     ADDITIONAL ACTIVITIES:  Pt completed standing activities within [facility]. First task focused on standing with attempted 1UE release to hit target, however pt was compensating with B forearms vs coming fully erect. Other events focused on static standing with full posture to improve standing tolerance for return to sinkside grooming and other ADLs with pt tolerating 45 seconds - 1 min 10 sec, 4 events, with fatigue at each event and moderate lengthy rest breaks required between each event. All for improved standing tolerance for sinkside ADL. ASSESSMENT:  Assessment: Harry Cooper is making slow steady progress towards therapy goals during short stay on IPR thus far. She has made progression in UB dressing with ability to complete with Oscar. She continues to require maxA for LB dressing/bathing tasks. Throughout the night/this AM patient has been having complications with catheter, urine going around catheter tubing. Sharri Dinero is completing functional transfers with use of jose stedy fluctuating between modA+2 to modA+1 and CGA+1 with increased time and cues for technique. She continues to exhibit decreased strength, activity tolerance, increase of pain in RLE with movement, increased need for assist during BADL routine which limits her from meeting her goals. Sharri Dinero is typically independent with BADL routine with SBA from  for safety measures. She is currently using 6L via trach mask. Pt continues to require skilled OT intervention to improve strength, activity tolerance, safety awareness, ADL/IADL performance required for pt to return home at [facility]. Without skilled services patient is at risk for falls, decrease in overall function and increased caregiver burden. Activity Tolerance:  Patient tolerance of  treatment: good. Discharge Recommendations: Cont to assess, requires further OT   Equipment Recommendations: Other: Monitor pending progress  Plan: Times Per Week: 5x per week for 90 minutes  Times Per Day: Once a day  Current Treatment Recommendations: Balance training, Functional mobility training, Endurance training, Self-Care / ADL, Safety education & training, Patient/Caregiver education & training, Strengthening    Patient Education  Patient Education:  standing endurance, transfer safety     Goals  Short Term Goals  Time Frame for Short Term Goals: until discharge  Short Term Goal 1: Pt will tolerate 12-15 min EOB ADL task with S to improve trunk control and activity tolerance required for EOB ADLs.   Short Term Goal 2: Pt will complete sit-stand t/fs with mod A x 1 with minimal cues of technique to progress towards BSC t/fs  Short Term Goal 3: Pt will tolerate standing 1 min with min A for increased ease of clothing management nad LB bathing routine  Short Term Goal 4: Pt will tolerate further assessment of functional t/fs by OTR when appropriate  Long Term Goals  Time Frame for Long Term Goals : 2 weeks from IPR OT eval  Long Term Goal 1: Pt will complete sit-stand t/fs with SBA x 1 with minimal cues of technique to progress towards BSC t/fs  Long Term Goal 2: Pt will perform UB/LB dressing and bathing with Min A and minimal cues for ECT to improve functional independence. Following session, patient left in safe position with all fall risk precautions in place.

## 2023-02-15 NOTE — PLAN OF CARE
Problem: Chronic Conditions and Co-morbidities  Goal: Patient's chronic conditions and co-morbidity symptoms are monitored and maintained or improved  2/15/2023 1103 by Arturo Councilman, RN  Outcome: Progressing  Flowsheets (Taken 2/15/2023 1324)  Care Plan - Patient's Chronic Conditions and Co-Morbidity Symptoms are Monitored and Maintained or Improved:   Monitor and assess patient's chronic conditions and comorbid symptoms for stability, deterioration, or improvement   Collaborate with multidisciplinary team to address chronic and comorbid conditions and prevent exacerbation or deterioration   Update acute care plan with appropriate goals if chronic or comorbid symptoms are exacerbated and prevent overall improvement and discharge     Problem: Discharge Planning  Goal: Discharge to home or other facility with appropriate resources  2/15/2023 1103 by Arturo Councilman, RN  Outcome: Progressing      Weekly team conference held to assess readiness for discharge. All PT and OT discharge goals must me met for patient to be considered safe to discharge. Problem: Skin/Tissue Integrity  Goal: Absence of new skin breakdown  Description: 1. Monitor for areas of redness and/or skin breakdown  2. Assess vascular access sites hourly  3. Every 4-6 hours minimum:  Change oxygen saturation probe site  4. Every 4-6 hours:  If on nasal continuous positive airway pressure, respiratory therapy assess nares and determine need for appliance change or resting period. Outcome: Progressing     Problem: ABCDS Injury Assessment  Goal: Absence of physical injury  Outcome: Progressing  No physical injury noted this shift. Problem: Safety - Adult  Goal: Free from fall injury  Outcome: Progressing  Falling star prevention in place. Bed and chair alarms in use. Call light in reach. Purposeful hourly rounding.     Problem: Pain  Goal: Verbalizes/displays adequate comfort level or baseline comfort level  Outcome: Progressing  Flowsheets (Taken 2/15/2023 9136)  Verbalizes/displays adequate comfort level or baseline comfort level:   Encourage patient to monitor pain and request assistance   Assess pain using appropriate pain scale   Implement non-pharmacological measures as appropriate and evaluate response   Administer analgesics based on type and severity of pain and evaluate response     Problem: Nutrition Deficit:  Goal: Optimize nutritional status  Outcome: Progressing  Tolerating current diet and po fluids well.

## 2023-02-15 NOTE — PROGRESS NOTES
Physical Medicine & Rehabilitation Progress Note    Chief Complaint:  Right hip fracture, low back compression fracture    Subjective:    Erica Ny is a 81 y.o. 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 The patient previously was admitted to inpatient rehab service from 2022 to 2022 due to critical care myopathy and debility/physical decondition as result of COVID-19 pneumonia. She was discharged to home with referral for home care service on 2022. The patient developed progressive difficulty breathing in 2022 and was found to have glottic stenosis and eventually received tracheostomy by Dr. Merlinda Sayer on 2022. The patient's  says the patient also developed progressively worsening lower back pain in summer 2022 and saw orthopedics surgeon at [facility]. The patient has been says the patient was diagnosed of having \"lumbar spine stress fracture\". No surgical intervention was performed. She was treated with brace which she later abandoned due to discomfort associate with brace usage.      The patient was recently hospitalized from 2023 to 2023 initially for abdominal bloating, nausea and leg swelling. She was found to have elevated D-dimer. Subsequent testing revealed right lower lobe pulmonary embolism and right lower extremity DVT. She was at initially treated with IV heparin and later transition to [address] [place] [address]. The patient was brought to ER on 2/1/2022 because of progressively increased leg swelling, and nausea/vomiting associated with decreased oral intake. She was found to have BNP of 256.7 and Bumex was prescribed. She then had a fall accident  when she was going to toilet while she was in ER on 2/1/2022. The fall resulted severe right hip pain. X-ray of right hip and pelvis revealed acute right femoral intertrochanteric fracture with subtrochanteric extension. Orthopedic service was consulted. Xarelto was held in preparation for surgical management. Cardiology was consulted on 2/2/2023 for elevated BNP and Lasix was started. Because of difficulty voiding with urinary retention, urology was also consulted on 2/2/2023. Urology service placed Wen with some difficulty on 2/2/2023. Therefore Wen was kept in place. On 2/3/2023 the patient underwent open treatment of right intertrochanteric fracture with cephalomedullary nail by Dr. Sheldon Perales. She is allowed weightbearing as tolerated at the right lower extremity postoperatively. PM&R was consulted on 2/5/2023. During the evaluation the patient's  said that the patient began having progressively worsened low back pain starting in summer 2022 began interfering the patient's daily function. Therefore she is sore orthopedic physician at [facility]. Imaging tests were done and the patient was told that she had \" stress fracture\" in her spine. She was provided with a lumbar spine brace to wear but she was not compliant with spine brace application because of discomfort it produced while she was wearing it.   Because no outside lumbar spine images study report was available for review, x-ray of lumbar spine was ordered and performed on 2/6/2023 and showed moderate vertebral body spondylosis in the lumbar and lower thoracic spine, moderate degenerative facet arthropathy involving at least lower 3 lumbar levels, lumbar spine osteoporosis, mild superior endplate depression fracture at L2, L3 and L4. Lumbar spine MRI was recommended by radiologist.  Therefore primary team ordered lumbar spine MRI which was performed this afternoon revealing acute fracture with a fluid cleft associated with endplate at U5-Q4 levels with mild height loss at each vertebral bodies. The patient was found to have severe anemia with Hgb/Hct dropped from 7.3/24 on 2/4/2023 down to 5.1/16.9 on 2/6/2023. Therefore she was given blood transfusion for few units. CT of abdomen and pelvis was ordered to rule out possible intra-abdominal source of blood loss. CT abdomen and pelvis done on 2/8/2023 show only pericolonic inflammation with diverticula related to acute diverticulitis, acute on chronic compression fracture of L2, L3, L4 and L5, mild, and marked osteopenia. The patient's hospital course also complicated by intermittent confusion, and development of gluteus region skin breakdown. The patient oxycodone usage caused stomach discomfort sensation with nauseous feeling and poor appetite. Therefore tramadol was prescribed by our rehab nurse practitioner last night. She says tramadol usage that does not produce stomach discomfort sensation. She says she feels tired from the therapy. She continues to have pain at her right hip, right groin and right upper thigh area. She says the pain medication help to reduce her pain intensity from 8/10 level down to 2/10 level. Moving her right lower extremity and weightbearing on right lower extremity aggravates the right hip pain intensity. She says the previously complained left chest pain had resolved after Voltaren gel application.   She says she tolerated the intensive inpatient rehabilitative treatment yesterday. The patient's case is discussed in multidisciplinary meeting today. He is projected be ready for discharge on 2/24/2023      Rehabilitation:  PT: Reviewed. Bed Mobility:  Supine to Sit: Maximum Assistance, X 2, with head of bed raised, with rail, with verbal cues , with increased time for completion  **Varied between Moderate and Maximum Assistance X1 at LE and Maximum Assistance x1 at trunk, verbal cues for sequencing and direction, cues for hand placement on rail and to push up with elbow to sit EOB  Scooting: Maximum Assistance, X 1, with verbal cues     Transfers:  Sit to Stand: Moderate Assistance, X 1, with Becki Kil, with increased time for completion, cues for hand placement, with verbal cues  Stand to Sit:Minimal Assistance, X 2, with Becki Kil, with increased time for completion, cues for hand placement, with verbal cues  **Maximum Assistance to scoot back in chair for proper positioning with verbal cues     Balance:  Static Sitting Balance: Stand By Assistance to [address], on EOB in preparation for transfer, cues for posture and scooting so feet are on floor  Static Standing Balance: Contact Guard Assistance, Minimal Assistance, X 1, with verbal cues , with increased time for completion in Becki Kil with BUE support, extra time for clothing management and pericare, verbal cues for posture and hand placement, Patient able to tolerate ~15 sec static standing with improved posture      OT: Reviewed. ADL:   Grooming: with set-up. For hair care seated in chair  Bathing: Maximum Assistance. To wash flaquito area/bottom while lying supine in bed. Remaining areas completed prior to arrival of therapist. Issac Walkerrn onto side in bed to complete bottom  Upper Extremity Dressing: Minimal Assistance. To [facility] gown and don dress seated in chair with increased time provided  Lower Extremity Dressing: Dependent.  Threading LEs into undergarment, rolled side to side in bed with increased time to pull up over hips with assist provided from therapist  Footwear Management: Dependent. To don B socks   Toileting: Dependent. For hygiene after incontinence of urine (patient having complications with catheter). BALANCE:  Sitting Balance:  Modified Independent. Standing Balance: Contact Guard Assistance. In [facility]      BED MOBILITY:  Rolling to Left: Maximum Assistance +1 with increased time cues for technique, use of side rail  Rolling to Right: Maximum Assistance +1 with increased time, use of side rail, cues for technique  Supine to Sit: Maximum Assistance +1 with cues provided for technique completing at slow pace  Sit to Supine: Moderate Assistance A with BLEs. TRANSFERS:  Sit to Stand: Moderate Assistance, X 1, with Becki Kil, from recliner. (Can achieve CGA from [address], however is noted pt comes forward flexed and compensates with B forearms vs coming fully erect)   Stand to Sit: Moderate Assistance, X 1, with Becki Kil. With cues to control descend to EOB      ADDITIONAL ACTIVITIES:  Patient completed BUE strengthening exercises with skilled education on HEP: completed x15 reps x1 set with a min resistance band in all joints and all planes in order to improve UE strength and activity tolerance required for BADL routine and toilet / shower transfers. Patient tolerated fair, requiring rest breaks. Patient also required cues for technique. During exercises patient complained of feeling light headed. Obtained vitals, noted above. Nurse and Doctor Keny Harmon notified. Pt completed standing activities within [address] [place] [address]. First task focused on standing with attempted 1UE release to retrieve linens and fold, however pt was compensating with B forearms vs coming fully erect and demo endurance of 50 seconds.  Other events focused on static standing with full posture to improve standing tolerance for return to sinkside grooming and other ADLs with pt tolerating 45, 40 and 30 seconds with fatigue at each event and moderate lengthy rest breaks required between each event. ST: Reviewed. [employer]   6/6 MAX cues  *overall lethargy impacting success this date and required entire 30 minute session to complete task. *poor sustained attention to task   *decreased thought organization, math computation, mental manipulation, and visual scanning     Delayed Recall ST Info  Delayed (~1 day): 5/6 independent, 1/6 given categorical cue     Monthly Calendar Organization  10/10 independent     *increased time to complete task, however, patient with good success on task. Rule of Ten  12 cards placed face up in front of patient. Patient with remaining stack of cards, flipping top card in stack and placing on one of the 12 cards so that the value of the two cards is 10. If a sum of 10 cannot be made, patient places card at bottom of stack. Trial 1: patient with x3 errors in 4:01  Trial 2: patient with x5 errors in 6:29     *patient with poor visual scanning, working memory, and math computations during task  *patient with errors in missing numbers when scanning, forgetting value of 10 (thought 21 was value), and adding amounts to 12 (7+5, 8+4)      Review of Systems:  Review of Systems   Constitutional:  Positive for appetite change and fatigue. Negative for chills, diaphoresis and fever. HENT:  Negative for hearing loss, rhinorrhea, sneezing, sore throat and trouble swallowing. Eyes:  Negative for visual disturbance. Respiratory:  Negative for cough, shortness of breath and wheezing. Cardiovascular:  Negative for chest pain and palpitations. Gastrointestinal:  Positive for abdominal pain. Negative for constipation, diarrhea, nausea and vomiting. Genitourinary:  Negative for dysuria.    Musculoskeletal:  Positive for arthralgias (Bilateral hip, right groin, and right thigh), back pain (Lower back), gait problem and myalgias (Right thigh). Negative for neck pain. Skin:  Negative for rash. Neurological:  Positive for weakness (Bilateral lower extremities especially on the right side) and numbness (Bilateral feet). Negative for dizziness, tremors, seizures, speech difficulty, light-headedness and headaches. Psychiatric/Behavioral:  Positive for sleep disturbance. Negative for dysphoric mood and hallucinations. The patient is not nervous/anxious.          Objective:  BP (!) 90/58   Pulse 85   Temp 98.1 °F (36.7 °C) (Oral)   Resp 20   Ht 5' 2\" (1.575 m)   Wt 230 lb 6.1 oz (104.5 kg)   LMP  (LMP Unknown)   SpO2 100%   BMI 42.14 kg/m²   Physical Exam   General:  well-developed, well nourished morbid obese  female ; in no acute distress ; appropriate affect & mood; sitting on reclining chair comfortably; receiving humidified oxygen supplement via trach collar; speaking in whispered manner due to presence of tracheostomy  Eyes: pupil equally round ; extra-ocular motion intact bilaterally  Head, Ear, Nose, Mouth & Throat : normocephalic ; no discharge from ears or nose ; no deformity ; no facial swelling ; oral mucosa pink  Neck :  supple ; presence of tracheostomy at anterior neck; no tenderness; mild muscle spasm at cervical paraspinal muscle and upper trapezius muscle  Cardiovascular : regular rate & rhythm ; normal S1 & S2 heart sound ; no murmur ; normal peripheral pulse at bilateral upper & lower extremities  Pulmonary : Breath sounds present at bilateral lung fields; no wheezing ; no rale; no crackle  Gastrointestinal : soft, protruded abdomen; mild tenderness at epigastric area; normal bowel sound present    : Wen catheter in place draining yellowish urine  Skin: no skin lesion or rash ; presence of adhesive dressing at right lateral upper and lower thigh; no pitting edema at bilateral upper extremities; 1+ pitting edema at right ankles; presence of PICC line on right arm near elbow area  Musculoskeletal : no limb asymmetry; no limb deformity; tenderness at right lateral upper thigh, right lateral hip and right groin area; no tenderness at bilateral upper extremities & the rest of bilateral lower extremities; no palpable mass at limbs ; right hip and right knee joint laxity not assessed; no joints laxity or crepitation at other limb joints; shoulder flexion and abduction passive ROM reaching 160 degrees bilaterally; right hip flexion passive ROM reaching 35 degrees and right knee flexion passive ROM reaching 35 degrees limited by pain at right hip and thigh; left hip flexion passive ROM reaching 90 degrees; ankle dorsiflexion passive ROM reaching 0 degrees bilaterally; otherwise normal functional joints ROM at the rest of bilateral upper & lower extremities  Cerebral :  alert ; awake ; oriented to place, person and time; follow 1-2 steps verbal command  Cerebellum : no dysmetria with bilateral finger-to-nose test ; unable to perform heel-to-shin test on the right side; dysmetria with left heel-to-shin test  Cranial nerve : CN II to XII function grossly intact  Sensory : intact light touch and pin prick sensation at bilateral upper extremities; reduced light touch and pinprick sensation at distal bilateral lower extremities from upper leg region downward in sock distribution  Motor : normal tone at bilateral upper & left lower extremities ; muscle tone not assessed on right lower extremity due to the pain; 2-/5 muscle strength at right hip flexion, abduction and adduction; 3-/5 muscle strength at the left hip flexion; 3-/5 muscle strength at right knee extension ; 2+/5 muscle strength at the right knee flexion; 4+/5 muscle strength at the left knee flexion ; otherwise 5/5 muscle strength at the rest of bilateral upper and the lower extremities  Reflex : 1+ bilateral brachioradialis reflexes; 0 bilateral biceps and bilateral knees reflexes   Pathological Reflex :  No Roberta's sign ; no ankle clonus  Gait : Not assessed      Diagnostics:   Recent Results (from the past 24 hour(s))   Urine with Reflexed Micro    Collection Time: 02/14/23  2:45 PM   Result Value Ref Range    Glucose, Ur NEGATIVE NEGATIVE mg/dl    Bilirubin Urine NEGATIVE NEGATIVE    Ketones, Urine NEGATIVE NEGATIVE    Specific Gravity, Urine 1.018 1.002 - 1.030    Blood, Urine MODERATE (A) NEGATIVE    pH, UA 5.0 5.0 - 9.0    Protein, UA NEGATIVE NEGATIVE    Urobilinogen, Urine 0.2 0.0 - 1.0 eu/dl    Nitrite, Urine NEGATIVE NEGATIVE    Leukocyte Esterase, Urine LARGE (A) NEGATIVE    Color, UA YELLOW STRAW-YELLOW    Character, Urine CLOUDY (A) CLEAR-SL CLOUD    RBC, UA 5-10 0-2/hpf /hpf    WBC, UA > 200 0-4/hpf /hpf    Epithelial Cells, UA 0-2 3-5/hpf /hpf    Bacteria, UA FEW FEW/NONE SEEN /hpf    Casts UA 4-8 HYALINE NONE SEEN /lpf    Crystals, UA OXALATE NONE SEEN    Renal Epithelial, UA NONE SEEN NONE SEEN    Yeast, UA MANY NONE SEEN    CASTS 2 NONE SEEN NONE SEEN /lpf    MISCELLANEOUS 2 NONE SEEN    Culture, Reflexed, Urine    Collection Time: 02/14/23  2:45 PM    Specimen: Urine   Result Value Ref Range    Organism Candida albicans (A)     Urine Culture Reflex [place] count: >100,000 CFU/mL    POCT glucose    Collection Time: 02/15/23  8:09 AM   Result Value Ref Range    POC Glucose 116 (H) 70 - 108 mg/dl       Impression:  Right femoral intertrochanteric fracture with subtrochanteric extension due to fall requiring open reduction and internal fixation with cephalomedullary nail  Glottic stenosis requiring tracheostomy  Persistent chronic low back pain due to lumbar and lower thoracic spine spondylosis with degenerative facet arthropathy at the lower 3 lumbar levels, and acute L2-S1 endplate fractures  Right posterior tibial, left greater saphenous, left peroneal and left anterior tibial veins deep vein thrombosis, and right lower lobe pulmonary embolism  Acute anemia requiring blood transfusion  Urinary retention due to urethra obstruction requiring Wen cath placement ; now with leakage around the Wen catheter possible due to Wen catheter malfunction  Lumbar spine osteoporosis  Gluteal region decubitus ulcer  Diabetes mellitus type 2 with poor blood glucose control and complicated by bilateral lower extremities diabetic polyneuropathy  Morbid obesity  Hypertension  Hyperlipidemia  History of lower back pain with history of \"lumbar stress fracture\"  History of COVID infection with pneumonia  History of mild cognitive impairment  History of coronary artery disease requiring coronary artery stenting  Grade 1 left ventricular diastolic dysfunction with preserved ejection fraction     The patient's urine was sent for analysis yesterday showing large leukocyte esterase. Urine culture is pending. I will start patient on Macrobid empirically for UTI. She says she feels tired from the therapy session. She says she had good sleep last night. She continues to have significant pain at right hip. She complained of oxycodone because some GI side effects. I will discontinue oxycodone and continue using tramadol, lidocaine patch and Voltaren gel for pain control. Her right lower extremity remains weak. She is tolerating the intensive inpatient rehabilitation treatment. Her function is improving very slowly. Currently the patient is projected to be ready for discharge on 2/24/2023      Plan:  Continues intensive PT/OT/SLP/RT inpatient rehabilitation program at least 3 hours per day, 5 days per week in order to improve functional status prior to discharge. Family education and training will be completed. Equipment evaluations and recommendations will be completed as appropriate. Rehabilitation nursing continues to be involved for bowel, bladder, skin, and pain management. Nursing will also provide education and training to patient and family.     Prophylaxis:  DVT: Patient on Xarelto, KENA stocking, intermittent pneumatic compression device.   GI: Colace, Enemeez enema, GlycoLax, Senokot, lactulose as needed, milk of magnesium as needed; add Movantik  Pain: Tylenol,  lidocaine patch, Voltaren gel as needed, tramadol as needed; discontinue oxycodone  Continue aspirin for coronary artery disease  Continue Lasix for lower extremities edema  Continue Lantus insulin, Humalog insulin, Humalog insulin coverage for diabetes mellitus  Start Macrobid empirically for UTI  Continue Seroquel for anxiety and insomnia   Continue Xarelto for bilateral DVT and pulmonary embolism  Continue Toprol-XL for hypertension  Continue Crestor for hyperlipidemia  Continue melatonin, trazodone as needed for insomnia; increase trazodone dosage to 100 mg  Continue Protonix for gastric protection  Continue simethicone as needed for indigestion  Continue multivitamin and ferrous sulfate for anemia  Follow up urine culture result  Waiting for patient to be reevaluated by urology service in regarding bladder spasm and Wen malfunction  Nutrition: Continue current diet  Bladder: Monitoring signs or symptoms of UTI  Bowel: Monitoring signs or symptoms of constipation   and case management for coordination of care and discharge planning      Missed Therapy Time:  None      Jin Hodge MD

## 2023-02-15 NOTE — PROGRESS NOTES
[address] [facility] PHYSICAL THERAPY  DAILY NOTE  [address] [facility],4Th Floor - 7E-[id number]/[id number]    Time In: 1003  Time Out: 1036  Timed Code Treatment Minutes: 33 Minutes  Minutes: 33          Date: 2/15/2023  Patient Name: Gilma Cheney,  Gender:  female        MRN: [id number]  : 1950  (81 y.o.)     Referring Practitioner: Isael Fisher MD  Diagnosis: closed intertrochanteric fx, Rt  Additional Pertinent Hx: Griselda Sabillon is a 73yoF with PMH of recent PE and b/l DVT, macrocytic anemia, CAD, DM2, tracheostomy dependent, obesity, and HTN who presents for leg swelling, nausea, and constipation. She was recently discharged after an 8 day admission for DVTs and PEs. She was placed on Xarelto when discharged and stated that she has been compliant and only missed one dose. She returned due to chronic constipation, feeling ''bloated'', nausea, and LE edema. While being evaluated in the ED the pt had a mechanical fall and developed a R femur fracture. She was found to have supratheraputic INR at 3.83. CT head and C-spine both clear. Pt is s/p R femur ORIF by Dr Kehinde Kasper . Pt found to have acute fractures of L2-S1 likely 2/2 osteoporosis, ok for activity as tolerated per Dr Christopher Flores . Prior Level of Function:  Lives With: Spouse  Type of Home: House  Home Layout: One level  Home Access: Ramped entrance (or 1 step with no rails.)  Home Equipment: Elner Champ, rolling, Lift chair (Pt sleeps in lift chair and was using it to assist up to stand PTA)   Bathroom Shower/Tub: Walk-in shower  Bathroom Toilet: Standard  Bathroom Equipment: Built-in shower seat, 3-in-1 commode    ADL Assistance: Needs assistance  Homemaking Assistance: Needs assistance  Ambulation Assistance: Independent  Transfer Assistance: Independent  Active : No  Additional Comments: pt amb short distances in the home with RW,  available / and able to assist as needed.  Has a passi valve that she uses at rest.    Restrictions/Precautions:  Restrictions/Precautions: General Precautions, Fall Risk, Weight Bearing  Right Lower Extremity Weight Bearing: Weight Bearing As Tolerated  Position Activity Restriction  Other position/activity restrictions: Tracheostomy/collar 6L. With venturi mask for out of room use 8 liters portable O2. SUBJECTIVE: Patient in chair upon arrival and agreeable to therapy. Patient's family present in room to observe session. Patient requested to use commode during session. PAIN: not rated    Vitals: Vitals not assessed per clinical judgement, see nursing flowsheet    OBJECTIVE:  Bed Mobility:  Not Tested    Transfers:  Sit to Stand: Minimal Assistance X1 from chair with Hiwot Duet, Moderate Assistance X2 from commode with Hiwot Duet  Stand to [address] X2 to commode with Hiwot Duet, Minimal Assistance X1 to chair with Hiwot Duet  **Verbal cues for hand placement, cues for patient to lean forward prior to standing  **Patient required assistance to scoot forward in chair prior to standing and Maximal Assistance X2 to scoot back in chair for proper positioning with verbal cues  Extra time for portable O2 tank set-up    Balance:  Static Sitting Balance:  Stand By Assistance  Static Standing Balance: Contact Guard Assistance X1 and CGA to Minimal Assistance X 1, with verbal cues  **Verbal cues for posture, verbal cues for hand placement, patient able to tolerate ~30 sec of standing at Hiwot Duet with patient c/o of knee pain  Patient stood ~45 sec for assistance with pericare     Exercise:  Not performed    Functional Outcome Measures: Not completed       ASSESSMENT:  Assessment: Patient progressing toward established goals. Activity Tolerance:  Patient tolerance of  treatment: good.       Equipment Recommendations:Equipment Needed: No  Other: Pt has RW, manual w/c and mechanical lift device  Discharge Recommendations: Continue to assess pending progress and Patient would benefit from continued PT at discharge  Plan: Current Treatment Recommendations: Strengthening, Balance training, Functional mobility training, Transfer training, Endurance training, Equipment evaluation, education, & procurement, Patient/Caregiver education & training, Safety education & training, Therapeutic activities, Home exercise program, Wheelchair mobility training, Gait training, Pain management  General Plan:  (5x/ wk 90 min)    Patient Education  Patient Education: Plan of Care, Transfers    Goals:  Patient Goals : get around o my own without the RW  Short Term Goals  Time Frame for Short Term Goals: 1 wk  Short Term Goal 1: Pt will go from supine <->sit, mod +1 to get in/out of bed. Short Term Goal 2: Pt will get up/down from bed, into JEANNETTE stedy device, +1 mod assist to progress to up to RW  Short Term Goal 3: Pt will  the JEANNETTE stedy device, +1 min assist x5 min, to progress to standing with RW  Short Term Goal 4: Pt will propel w/c >= 50 ft, tile surface, CGA for home mobility  Long Term Goals  Time Frame for Long Term Goals : 3 wks from evaluation. Long Term Goal 1: Pt to go supine <->sit, min +1 to get in/out of bed. Long Term Goal 2: Pt to get up/down from bed or w/c +1 min assist to get up to walk. Long Term Goal 3: Pt to perform stand/pivot transfer with RW, +1 min assist to get in/out of w/c. Long Term Goal 4: Pt to walk with RW >= 10 ft, mn +1 to progress to home mobility  Long Term Goal 5: Pt to propel w/c >= 50 ft, Mod I, for home mobility  Additional Goals?: Yes  Long term goal 6: Pt to get in/out of car, min +1 for transportation needs. Following session, patient left in safe position with all fall risk precautions in place. Therapy session performed by Hipolito BOSCH under supervision of credentialed therapist Ashvin Ontiveros PTA.

## 2023-02-15 NOTE — PROGRESS NOTES
[address] [facility] THERAPY  [facility]  DAILY NOTE    TIME   SLP Individual Minutes  Time In: 0830  Time Out: 0900  Minutes: 30  Timed Code Treatment Minutes: 30 Minutes       Date: 2/15/2023  Patient Name: Christina Loyd      CSN: [id number]   : 1950  (81 y.o.)  Gender: female   Referring Physician:  Adriane Trimble MD  Diagnosis: Closed intertrochanteric fracture, right,  initial encounter  Precautions: Fall risk  Current Diet: Regular and Thin Liquids   Swallowing Strategies: Standard Universal Swallow Precautions  Date of Last MBS/FEES: Not Applicable    Pain:   - Pain location: R Hip - RN aware and provided pain medication    Subjective:  Patient sitting in recliner upon ST arrival. High levels of lethargy throughout session requiring mod-max cues for adequate alertness. Lethargy impacted overall success with tasks this date. Short-Term Goals:  SHORT TERM GOAL #1:  REVISED: Goal 1: Patient will complete functional problem solving and reasoning tasks with 80% accuracy and min cuing in order to improve completion of ADLs/IADLs at discharge. INTERVENTIONS:   Money Management - [employer]   6/6 MAX cues  *overall lethargy impacting success this date and required entire 30 minute session to complete task. *poor sustained attention to task   *decreased thought organization, math computation, mental manipulation, and visual scanning    SHORT TERM GOAL #2:  REVISED: Goal 2: Patient will complete functional immediate, working, and delayed recall tasks of 4+ units of information, with or without use of trained recall strategies, with 80% accuracy given min cues to improve retention of pertinent medical information. INTERVENTIONS: Did not address this session d/t focus on other goals.      SHORT TERM GOAL #3:  REVISED: Goal 3: Patient will complete functional thought organization and sequencing exercises with 85% accuracy and min cuing  in order to improve  completion  of  ADLs/IADLs. INTERVENTIONS: Did not address this session d/t focus on other goals. SHORT TERM GOAL #4:  Goal 4: Patient will complete sustained, selective, alternating, and divided attention tasks with no more than three  errors/redirections in five minutes/one task in order to allow for safe return to driving at discharge. INTERVENTIONS: Did not address this session d/t focus on other goals. Long-Term Goals:  Time Frame for Long Term Goals: 3 weeks    LONG TERM GOAL #1:  Goal 1: Patient will improve cognitive  functioning  to a level of modified independent in order to allow for safe return to PLOF. Comprehension: 5 - Patient understands basic needs (hungry/hot/pain)  Expression: 5 - Expresses basic ideas/needs only (hungry/hot/pain)  Social Interaction: 5 - Patient is appropriate with supervision/cues  Problem Solvin - Patient solves simple/routine tasks 75-90%+   Memory: 3 - Patient remembers 50%-74% of the time    EDUCATION:  Learner: Patient  Education:  Reviewed ST goals and Plan of Care and Education Related to [employer] [facility]  Evaluation of Education: Jaspreet Shankar understanding, Demonstrates with assistance, and Needs further instruction    ASSESSMENT/PLAN:  Activity Tolerance:  Patient tolerance of  treatment: good. Assessment/Plan: Patient progressing toward established goals. Continues to require skilled care of licensed speech pathologist to progress toward achievement of established goals and plan of care. .     Plan for Next Session: Medication management, sequencing, BASIC money management  Discharge Recommendations: Outpatient Speech Therapy vs [address] [facility]) SASHA Conley, [address]

## 2023-02-15 NOTE — PROGRESS NOTES
[address]  Occupational Therapy  Daily Note  Time:    Time In: 1130  Time Out: 1230  Timed Code Treatment Minutes: 60 Minutes  Minutes: 60          Date: 2/15/2023  Patient Name: Ton Rayo,   Gender: female      Room: [facility]55/055-A  MRN: [id number]  : 1950  (81 y.o.)  Referring Practitioner: Geeta Farris MD  Diagnosis: closed intertrochanteric fracture, right  Additional Pertinent Hx: The patient was recently hospitalized from 2023 to 2023 initially for abdominal bloating, nausea and leg swelling. She was found to have elevated D-dimer. Subsequent testing revealed right lower lobe pulmonary embolism and right lower extremity DVT. She was at initially treated with IV heparin and later transition to [address] [place] [address]. The patient was brought to ER on 2022 because of progressively increased leg swelling, and nausea/vomiting associated with decreased oral intake. She was found to have BNP of 256.7 and Bumex was prescribed. She then had a fall accident  when she was going to toilet while she was in ER on 2022. The fall resulted severe right hip pain. X-ray of right hip and pelvis revealed acute right femoral intertrochanteric fracture with subtrochanteric extension. Orthopedic service was consulted. Xarelto was held in preparation for surgical management. Cardiology was consulted on 2023 for elevated BNP and Lasix was started. Because of difficulty voiding with urinary retention, urology was also consulted on 2023. Urology service placed Wen with some difficulty on 2023. Therefore Wen was kept in place. On 2/3/2023 the patient underwent open treatment of right intertrochanteric fracture with cephalomedullary nail by Dr. Ani Brown. She is allowed weightbearing as tolerated at the right lower extremity postoperatively.     Restrictions/Precautions:  Restrictions/Precautions: General Precautions, Fall Risk, Weight Bearing  Right Lower Extremity Weight Bearing: Weight Bearing As Tolerated  Position Activity Restriction  Other position/activity restrictions: Tracheostomy/collar 6L. With venturi mask for out of room use 8 liters portable O2. SUBJECTIVE: Pt. Pleasant throughout session and agreeable to OT session. PAIN: 8/10: RLE    Vitals: O2 91-94% at 6 L with trach mask    COGNITION: Decreased Insight, Decreased Safety Awareness, and Difficulty Following Commands    ADL:   Footwear Management: Dependent. Inez Perez BALANCE:  Sitting Balance:  Supervision. Standing Balance: Contact Guard Assistance. Supported in [address] [address]:   Not Tested    TRANSFERS:  Sit to Stand:  Minimal Assistance, X 2.    Stand to Sit: Contact Guard Assistance, X 2, with increased time for completion. With use of Sharri haider    FUNCTIONAL MOBILITY:  Not assessed    ADDITIONAL ACTIVITIES:  OTR placed towels to R side of pt and instructed pt to cross midline to reach linen, fold at midline then cross midline to L side to place folded towels into laundry basket. OTR further graded task up by instructing pt to lean forward to not rely on back support of w/c, pt able to complete with SBA, task performed to improve I with seated ADLs/IADLs. In order to improve pt's BUE strength for ease with ADL based transfers, pt set up with 2# weighted dowel mike to complete 10 reps in all available joints/planes with min vc for proper technique. ASSESSMENT:  Assessment: Phillip Walden is making slow steady progress towards therapy goals during short stay on IPR thus far. She has made progression in UB dressing with ability to complete with Oscar. She continues to require maxA for LB dressing/bathing tasks. Throughout the night/this AM patient has been having complications with catheter, urine going around catheter tubing.  Phillip Walden is completing functional transfers with use of jose haider fluctuating between modA+2 to modA+1 and CGA+1 with increased time and cues for technique. She continues to exhibit decreased strength, activity tolerance, increase of pain in RLE with movement, increased need for assist during BADL routine which limits her from meeting her goals. Kaleigh is typically independent with BADL routine with SBA from  for safety measures. She is currently using 6L via trach mask. Pt continues to require skilled OT intervention to improve strength, activity tolerance, safety awareness, ADL/IADL performance required for pt to return home at [facility]. Without skilled services patient is at risk for falls, decrease in overall function and increased caregiver burden.  Activity Tolerance:  Patient tolerance of  treatment: fair.      Limited by pain, fatigue and need for rest breaks.   Discharge Recommendations: Continue to assess pending progress and Patient would benefit from continued OT at discharge  Equipment Recommendations: Other: Monitor pending progress  Plan: Times Per Week: 5x per week for 90 minutes  Times Per Day: Once a day  Current Treatment Recommendations: Balance training, Functional mobility training, Endurance training, Self-Care / ADL, Safety education & training, Patient/Caregiver education & training, Strengthening    Patient Education  Patient Education: ADL's, IADL's, Importance of Increasing Activity, and Assistive Device Safety    Goals  Short Term Goals  Time Frame for Short Term Goals: until discharge  Short Term Goal 1: Pt will tolerate 12-15 min EOB ADL task with S to improve trunk control and activity tolerance required for EOB ADLs.  Short Term Goal 2: Pt will complete sit-stand t/fs with mod A x 1 with minimal cues of technique to progress towards BSC t/fs  Short Term Goal 3: Pt will tolerate standing 1 min with min A for increased ease of clothing management nad LB bathing routine  Short Term Goal 4: Pt will tolerate further assessment of functional t/fs by OTR when appropriate  Long Term Goals  Time Frame for Long Term  Goals : 2 weeks from IPR OT eval  Long Term Goal 1: Pt will complete sit-stand t/fs with SBA x 1 with minimal cues of technique to progress towards BSC t/fs  Long Term Goal 2: Pt will perform UB/LB dressing and bathing with Min A and minimal cues for ECT to improve functional independence. Following session, patient left in safe position with all fall risk precautions in place.

## 2023-02-15 NOTE — PLAN OF CARE
Problem: Discharge Planning  Goal: Discharge to home or other facility with appropriate resources  Flowsheets (Taken 2/15/2023 0826 by Desire Bergeron RN)  Discharge to home or other facility with appropriate resources:   Identify barriers to discharge with patient and caregiver   Arrange for needed discharge resources and transportation as appropriate   Identify discharge learning needs (meds, wound care, etc)  Note: Team conference held Wednesday, 2/15. Recommendations of the team were explained to the patient, Gonzalo and daughter, Kimberly by Dr Smith and SERVANDO. Team is recommending that patient continue on acute inpatient rehab for PT, OT and ST, with expected discharge date of Friday, 2/24 pending progress. Following discharge, team is recommending SNF or home health services. Family is unsure of discharge plan at this time. They are wanting to patient to get as much therapy on IPR as possible. Care plan reviewed with patient and family.  Patient and family verbalized understanding of the plan of care and contributed to goal setting. SW to follow and maintain involvement in discharge planning.

## 2023-02-15 NOTE — PROGRESS NOTES
Physical Medicine & Rehabilitation Progress Note    Chief Complaint:  Right hip fracture, low back compression fracture    Subjective:    Ton Rayo is a 81 y.o. 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 The patient previously was admitted to inpatient rehab service from 2022 to 2022 due to critical care myopathy and debility/physical decondition as result of COVID-19 pneumonia. She was discharged to home with referral for home care service on 2022. The patient developed progressive difficulty breathing in 2022 and was found to have glottic stenosis and eventually received tracheostomy by Dr. Zahra Vaughn on 2022. The patient's  says the patient also developed progressively worsening lower back pain in summer 2022 and saw orthopedics surgeon at [facility]. The patient has been says the patient was diagnosed of having \"lumbar spine stress fracture\". No surgical intervention was performed. She was treated with brace which she later abandoned due to discomfort associate with brace usage.      The patient was recently hospitalized from 2023 to 2023 initially for abdominal bloating, nausea and leg swelling. She was found to have elevated D-dimer. Subsequent testing revealed right lower lobe pulmonary embolism and right lower extremity DVT. She was at initially treated with IV heparin and later transition to [address] [place] [address]. The patient was brought to ER on 2/1/2022 because of progressively increased leg swelling, and nausea/vomiting associated with decreased oral intake. She was found to have BNP of 256.7 and Bumex was prescribed. She then had a fall accident  when she was going to toilet while she was in ER on 2/1/2022. The fall resulted severe right hip pain. X-ray of right hip and pelvis revealed acute right femoral intertrochanteric fracture with subtrochanteric extension. Orthopedic service was consulted. Xarelto was held in preparation for surgical management. Cardiology was consulted on 2/2/2023 for elevated BNP and Lasix was started. Because of difficulty voiding with urinary retention, urology was also consulted on 2/2/2023. Urology service placed Wen with some difficulty on 2/2/2023. Therefore Wen was kept in place. On 2/3/2023 the patient underwent open treatment of right intertrochanteric fracture with cephalomedullary nail by Dr. Madhavi Blount. She is allowed weightbearing as tolerated at the right lower extremity postoperatively. PM&R was consulted on 2/5/2023. During the evaluation the patient's  said that the patient began having progressively worsened low back pain starting in summer 2022 began interfering the patient's daily function. Therefore she is sore orthopedic physician at [facility]. Imaging tests were done and the patient was told that she had \" stress fracture\" in her spine. She was provided with a lumbar spine brace to wear but she was not compliant with spine brace application because of discomfort it produced while she was wearing it.   Because no outside lumbar spine images study report was available for review, x-ray of lumbar spine was ordered and performed on 2/6/2023 and showed moderate vertebral body spondylosis in the lumbar and lower thoracic spine, moderate degenerative facet arthropathy involving at least lower 3 lumbar levels, lumbar spine osteoporosis, mild superior endplate depression fracture at L2, L3 and L4. Lumbar spine MRI was recommended by radiologist.  Therefore primary team ordered lumbar spine MRI which was performed this afternoon revealing acute fracture with a fluid cleft associated with endplate at L3-Q9 levels with mild height loss at each vertebral bodies. The patient was found to have severe anemia with Hgb/Hct dropped from 7.3/24 on 2/4/2023 down to 5.1/16.9 on 2/6/2023. Therefore she was given blood transfusion for few units. CT of abdomen and pelvis was ordered to rule out possible intra-abdominal source of blood loss. CT abdomen and pelvis done on 2/8/2023 show only pericolonic inflammation with diverticula related to acute diverticulitis, acute on chronic compression fracture of L2, L3, L4 and L5, mild, and marked osteopenia. The patient's hospital course also complicated by intermittent confusion, and development of gluteus region skin breakdown. The patient says she feels well today. She says she had good sleep last night. She says her abdomen is no longer painful now. However she is complaining of left side right-sided low back sore aching pain. Her right anterior inner upper thigh also feels sore. She still has pain at her right hip, right groin and right lateral thigh from the surgery and fracture. She says tramadol usage helps to control the pain intensity to a tolerable level. Moving her right lower extremity still aggravates the painful symptom intensity. She rates the pain intensity at 7-8/10 level. She took tramadol 100 mg 3 times yesterday. Because her urine grew Candida albicans on 2/14/2023 urine culture, fluconazole was started for 2 weeks.   She continues tolerating the intensive inpatient rehabilitation treatment. The patient is projected be ready for discharge on 2/24/2023      Rehabilitation:  PT: Reviewed. Bed Mobility:  Supine to Sit: Moderate Assistance to fully clear RLE and at trunk to elevate. Bed flat, 1 rail used  Scooting: Moderate Assistance, using 1/2 bridges and rail assist to scoot laterally approx 2 \"     Transfers:  Sit to Stand: Moderate Assistance, X 1, with Margeret Handler. Min assist +1 from device pads  Stand to Sit:Minimal Assistance  To/From Bed and Chair: Dependent, X 1, with Margeret Handler     Balance:  Static Sitting Balance:  Stand By Assistance  Static Standing Balance: Minimal Assistance, with verbal cues , and demo given to stand within the device with hands only on the front bar as pt tends to lean onto her forearms. Pt stood 40 sec with hands only and upright posture and then 10 sec with forearm support prior to need to sit. Mod cues needed for encouragement to attempt to stand longer as pt was wanting to sit after 15 sec. OT: Reviewed. ADL:   Footwear Management: Dependent. Tyrone Schneider BALANCE:  Sitting Balance:  Modified Independent. Standing Balance: Contact Guard Assistance. In jose haider      BED MOBILITY:  Sit to Supine: Moderate Assistance A with BLEs. TRANSFERS:  Sit to Stand:  Minimal  Assistance, X 1, with Margeret Handler, from recliner. (Can achieve CGA from [address], however is noted pt comes forward flexed and compensates with B forearms vs coming fully erect)   Stand to Sit: Minimal Assistance, X 1, with Margeret Handler. With cues to control descend to EOB      ADDITIONAL ACTIVITIES:  OTR placed towels to R side of pt and instructed pt to cross midline to reach linen, fold at midline then cross midline to L side to place folded towels into laundry basket.  OTR further graded task up by instructing pt to lean forward to not rely on back support of w/c, pt able to complete with SBA, task performed to improve I with seated ADLs/IADLs. In order to improve pt's BUE strength for ease with ADL based transfers, pt set up with 2# weighted dowel mike to complete 10 reps in all available joints/planes with min vc for proper technique. Pt completed standing activities within [address] [facility] ste. First task focused on standing with attempted 1UE release to hit target, however pt was compensating with B forearms vs coming fully erect. Other events focused on static standing with full posture to improve standing tolerance for return to sinkside grooming and other ADLs with pt tolerating 45 seconds - 1 min 10 sec, 4 events, with fatigue at each event and moderate lengthy rest breaks required between each event. All for improved standing tolerance for sinkside ADL. ST: Reviewed. eyeOS - [facility]   6/6 MAX cues  *overall lethargy impacting success this date and required entire 30 minute session to complete task. *poor sustained attention to task   *decreased thought organization, math computation, mental manipulation, and visual scanning      Review of Systems:  Review of Systems   Constitutional:  Positive for fatigue. Negative for appetite change, chills, diaphoresis and fever. HENT:  Negative for hearing loss, rhinorrhea, sneezing, sore throat and trouble swallowing. Eyes:  Negative for visual disturbance. Respiratory:  Negative for cough, shortness of breath and wheezing. Cardiovascular:  Negative for chest pain and palpitations. Gastrointestinal:  Negative for abdominal pain, constipation, diarrhea, nausea and vomiting. Genitourinary:  Negative for dysuria. Musculoskeletal:  Positive for arthralgias (Bilateral hip, right groin, and right thigh), back pain (Right-sided lower back), gait problem and myalgias (Right thigh). Negative for neck pain. Skin:  Negative for rash. Neurological:  Positive for weakness (Bilateral lower extremities especially on the right side) and numbness (Bilateral feet). Negative for dizziness, tremors, seizures, speech difficulty, light-headedness and headaches. Psychiatric/Behavioral:  Positive for sleep disturbance. Negative for dysphoric mood and hallucinations. The patient is not nervous/anxious.          Objective:  /66   Pulse 88   Temp 98 °F (36.7 °C) (Oral)   Resp 20   Ht 5' 2\" (1.575 m)   Wt 204 lb 1.6 oz (92.6 kg)   LMP  (LMP Unknown)   SpO2 96%   BMI 37.33 kg/m²   Physical Exam   General:  well-developed, well nourished morbid obese  female ; in no acute distress ; appropriate affect & mood; sitting on wheelchair comfortably; receiving humidified oxygen supplement via trach collar; speaking in whispered manner due to presence of tracheostomy  Eyes: pupil equally round ; extra-ocular motion intact bilaterally  Head, Ear, Nose, Mouth & Throat : normocephalic ; no discharge from ears or nose ; no deformity ; no facial swelling ; oral mucosa pink  Neck :  supple ; presence of tracheostomy at anterior neck; no tenderness; mild muscle spasm at cervical paraspinal muscle and upper trapezius muscle  Cardiovascular : regular rate & rhythm ; normal S1 & S2 heart sound ; no murmur ; normal peripheral pulse at bilateral upper & lower extremities  Pulmonary : Breath sounds present at bilateral lung fields; no wheezing ; no rale; no crackle  Gastrointestinal : soft, protruded abdomen; mild tenderness at epigastric area; normal bowel sound present    : Wen catheter in place draining yellowish urine  Back : Tenderness at the right lumbar region; no muscle spasm  Skin: no skin lesion or rash ; presence of adhesive dressing at right lateral upper and lower thigh; no pitting edema at bilateral upper extremities; 1+ pitting edema at right ankles and right leg; presence of PICC line on right arm near elbow area  Musculoskeletal : no limb asymmetry; no limb deformity; tenderness at right lateral upper thigh, right lateral hip and right groin area; no tenderness at bilateral upper extremities & the rest of bilateral lower extremities; no palpable mass at limbs ; right hip and right knee joint laxity not assessed; no joints laxity or crepitation at other limb joints; shoulder flexion and abduction passive ROM reaching 160 degrees bilaterally; right hip flexion passive ROM reaching 80 degrees and right knee flexion passive ROM reaching 85 degrees limited by pain at right hip and thigh; left hip flexion passive ROM reaching 90 degrees; ankle dorsiflexion passive ROM reaching 0 degrees bilaterally; otherwise normal functional joints ROM at the rest of bilateral upper & lower extremities  Cerebral :  alert ; awake ; oriented to place, person and time; follow 1-2 steps verbal command  Cerebellum : no dysmetria with bilateral finger-to-nose test ; unable to perform heel-to-shin test on the right side; dysmetria with left heel-to-shin test  Cranial nerve : CN II to XII function grossly intact  Sensory : intact light touch and pin prick sensation at bilateral upper extremities; reduced light touch and pinprick sensation at distal bilateral lower extremities from upper leg region downward in sock distribution  Motor : normal tone at bilateral upper & left lower extremities ; muscle tone not assessed on right lower extremity due to the pain; 2-/5 to 2+/5 muscle strength at right hip flexion, abduction and adduction; 4-/5 muscle strength at the left hip flexion; 3-/5 muscle strength at right knee extension ; 2+/5 muscle strength at the right knee flexion; 4+/5 muscle strength at the left knee flexion ; otherwise 5/5 muscle strength at the rest of bilateral upper and the lower extremities  Reflex : 1+ bilateral brachioradialis reflexes; 0 bilateral biceps and bilateral knees reflexes   Pathological Reflex :  No Roberta's sign ; no ankle clonus  Gait : Not assessed      Diagnostics:   Recent Results (from the past 24 hour(s))   CBC with Auto Differential    Collection Time: 02/15/23  1:00 PM   Result Value Ref Range    WBC 9.0 4.8 - 10.8 thou/mm3    RBC 2.96 (L) 4.20 - 5.40 mill/mm3    Hemoglobin 8.8 (L) 12.0 - 16.0 gm/dl    Hematocrit 28.7 (L) 37.0 - 47.0 %    MCV 97.0 81.0 - 99.0 fL    MCH 29.7 26.0 - 33.0 pg    MCHC 30.7 (L) 32.2 - 35.5 gm/dl    RDW-CV 16.9 (H) 11.5 - 14.5 %    RDW-SD 58.8 (H) 35.0 - 45.0 fL    Platelets 359 174 - 969 thou/mm3    MPV 9.4 9.4 - 12.4 fL    Seg Neutrophils 70.7 %    Lymphocytes 17.0 %    Monocytes 8.8 %    Eosinophils 1.3 %    Basophils 0.3 %    Immature Granulocytes 1.9 %    Segs Absolute 6.4 1.8 - 7.7 thou/mm3    Lymphocytes Absolute 1.5 1.0 - 4.8 thou/mm3    Monocytes Absolute 0.8 0.4 - 1.3 thou/mm3    Eosinophils Absolute 0.1 0.0 - 0.4 thou/mm3    Basophils Absolute 0.0 0.0 - 0.1 thou/mm3    Immature Grans (Abs) 0.17 (H) 0.00 - 0.07 thou/mm3    nRBC 0 /100 wbc   Basic Metabolic Panel w/ Reflex to MG    Collection Time: 02/15/23  1:00 PM   Result Value Ref Range    Sodium 140 135 - 145 meq/L    Potassium reflex Magnesium 3.7 3.5 - 5.2 meq/L    Chloride 104 98 - 111 meq/L    CO2 26 23 - 33 meq/L    Glucose 142 (H) 70 - 108 mg/dL    BUN 8 7 - 22 mg/dL    Creatinine 0.5 0.4 - 1.2 mg/dL    Calcium 8.7 8.5 - 10.5 mg/dL   Anion Gap    Collection Time: 02/15/23  1:00 PM   Result Value Ref Range    Anion Gap 10.0 8.0 - 16.0 meq/L   Glomerular Filtration Rate, Estimated    Collection Time: 02/15/23  1:00 PM   Result Value Ref Range    Est, Glom Filt Rate >60 >60 ml/min/1.73m2   Lipase    Collection Time: 02/15/23 11:15 PM   Result Value Ref Range    Lipase 39.7 5.6 - 51.3 U/L   Hepatic Function Panel    Collection Time: 02/15/23 11:15 PM   Result Value Ref Range    Albumin 2.7 (L) 3.5 - 5.1 g/dL    Total Bilirubin 0.4 0.3 - 1.2 mg/dL    Bilirubin, Direct <0.2 0.0 - 0.3 mg/dL    Alkaline Phosphatase 131 (H) 38 - 126 U/L    AST 14 5 - 40 U/L    ALT <5 (L) 11 - 66 U/L    Total Protein 5.0 (L) 6.1 - 8.0 g/dL   POCT glucose    Collection Time: 02/16/23  7:22 AM   Result Value Ref Range    POC Glucose 105 70 - 108 mg/dl       Urine culture (2/14/2023) :  Component 2/14/23 1445    Organism Candida albicans Abnormal     Urine Culture Reflex [place] count: >100,000 CFU/mL        Impression:  Right femoral intertrochanteric fracture with subtrochanteric extension due to fall requiring open reduction and internal fixation with cephalomedullary nail  Glottic stenosis requiring tracheostomy  Persistent chronic low back pain due to lumbar and lower thoracic spine spondylosis with degenerative facet arthropathy at the lower 3 lumbar levels, and acute L2-S1 endplate fractures  Right posterior tibial, left greater saphenous, left peroneal and left anterior tibial veins deep vein thrombosis, and right lower lobe pulmonary embolism  Acute anemia requiring blood transfusion  Urinary retention due to urethra obstruction requiring Wen cath placement   Candida albicans UTI  Lumbar spine osteoporosis  Gluteal region decubitus ulcer  Diabetes mellitus type 2 with poor blood glucose control and complicated by bilateral lower extremities diabetic polyneuropathy  Morbid obesity  Hypertension  Hyperlipidemia  History of lower back pain with history of \"lumbar stress fracture\"  History of COVID infection with pneumonia  History of mild cognitive impairment  History of coronary artery disease requiring coronary artery stenting  Grade 1 left ventricular diastolic dysfunction with preserved ejection fraction     The patient's condition is stable. Her abdominal discomfort has significantly improved. She now is on fluconazole for yeast UTI. She continues to have pain at her right hip and right thigh which is controlled with tramadol usage. Today she also complains of pain at the right side lower back. We can try scheduled Voltaren gel application for the low back pain. Her right lower extremity remains weak. She continues tolerating the intensive inpatient rehabilitation treatment.   Her function is improving very slowly. Currently the patient is projected to be ready for discharge on 2/24/2023      Plan:  Continues intensive PT/OT/SLP/RT inpatient rehabilitation program at least 3 hours per day, 5 days per week in order to improve functional status prior to discharge. Family education and training will be completed. Equipment evaluations and recommendations will be completed as appropriate. Rehabilitation nursing continues to be involved for bowel, bladder, skin, and pain management. Nursing will also provide education and training to patient and family. Prophylaxis:  DVT: Patient on Xarelto, KENA stocking, intermittent pneumatic compression device.   GI: Colace, Enemeez enema, GlycoLax, Senokot, lactulose as needed, milk of magnesium as needed; add Movantik  Pain: Tylenol,  lidocaine patch, Voltaren gel as needed, tramadol as needed; discontinue oxycodone  Continue aspirin for coronary artery disease  Continue Lasix for lower extremities edema  Continue Lantus insulin, Humalog insulin, Humalog insulin coverage for diabetes mellitus  Start Macrobid empirically for UTI  Continue Seroquel for anxiety and insomnia   Continue Xarelto for bilateral DVT and pulmonary embolism  Continue Toprol-XL for hypertension  Continue Crestor for hyperlipidemia  Continue melatonin, trazodone as needed for insomnia; increase trazodone dosage to 100 mg  Continue Protonix for gastric protection  Continue simethicone as needed for indigestion  Continue multivitamin and ferrous sulfate for anemia  Continue with Diflucan 200 mg daily for 2 weeks for Candida albicans yeast UTI  Waiting for patient to be reevaluated by urology service in regarding bladder spasm and Wen malfunction  Nutrition: Continue current diet  Bladder: Monitoring signs or symptoms of UTI  Bowel: Monitoring signs or symptoms of constipation   and case management for coordination of care and discharge planning      Missed Therapy Time:  None      Misael Cooper MD

## 2023-02-15 NOTE — PLAN OF CARE
Problem: Chronic Conditions and Co-morbidities  Goal: Patient's chronic conditions and co-morbidity symptoms are monitored and maintained or improved  Outcome: Progressing   Urinary catheter was kinked last night so urine flow was obstructed. I fixed placement and irrigated the werner with sterile saline and it seemed to drain better.

## 2023-02-16 LAB
ALBUMIN SERPL BCG-MCNC: 2.7 G/DL (ref 3.5–5.1)
ALP SERPL-CCNC: 131 U/L (ref 38–126)
ALT SERPL W/O P-5'-P-CCNC: < 5 U/L (ref 11–66)
ANION GAP SERPL CALC-SCNC: 14 MEQ/L (ref 8–16)
AST SERPL-CCNC: 14 U/L (ref 5–40)
BILIRUB CONJ SERPL-MCNC: < 0.2 MG/DL (ref 0–0.3)
BILIRUB SERPL-MCNC: 0.4 MG/DL (ref 0.3–1.2)
BUN SERPL-MCNC: 10 MG/DL (ref 7–22)
CALCIUM SERPL-MCNC: 8.6 MG/DL (ref 8.5–10.5)
CHLORIDE SERPL-SCNC: 105 MEQ/L (ref 98–111)
CO2 SERPL-SCNC: 22 MEQ/L (ref 23–33)
CREAT SERPL-MCNC: 0.6 MG/DL (ref 0.4–1.2)
GFR SERPL CREATININE-BSD FRML MDRD: > 60 ML/MIN/1.73M2
GLUCOSE BLD STRIP.AUTO-MCNC: 105 MG/DL (ref 70–108)
GLUCOSE SERPL-MCNC: 94 MG/DL (ref 70–108)
LIPASE SERPL-CCNC: 39.7 U/L (ref 5.6–51.3)
POTASSIUM SERPL-SCNC: 3.6 MEQ/L (ref 3.5–5.2)
PROT SERPL-MCNC: 5 G/DL (ref 6.1–8)
SODIUM SERPL-SCNC: 141 MEQ/L (ref 135–145)

## 2023-02-16 PROCEDURE — 97130 THER IVNTJ EA ADDL 15 MIN: CPT

## 2023-02-16 PROCEDURE — 6370000000 HC RX 637 (ALT 250 FOR IP): Performed by: FAMILY MEDICINE

## 2023-02-16 PROCEDURE — 2700000000 HC OXYGEN THERAPY PER DAY

## 2023-02-16 PROCEDURE — 97110 THERAPEUTIC EXERCISES: CPT

## 2023-02-16 PROCEDURE — 80048 BASIC METABOLIC PNL TOTAL CA: CPT

## 2023-02-16 PROCEDURE — 97530 THERAPEUTIC ACTIVITIES: CPT

## 2023-02-16 PROCEDURE — 36415 COLL VENOUS BLD VENIPUNCTURE: CPT

## 2023-02-16 PROCEDURE — 6370000000 HC RX 637 (ALT 250 FOR IP): Performed by: NURSE PRACTITIONER

## 2023-02-16 PROCEDURE — 2580000003 HC RX 258: Performed by: PHYSICAL MEDICINE & REHABILITATION

## 2023-02-16 PROCEDURE — 97542 WHEELCHAIR MNGMENT TRAINING: CPT

## 2023-02-16 PROCEDURE — 97535 SELF CARE MNGMENT TRAINING: CPT

## 2023-02-16 PROCEDURE — 1180000000 HC REHAB R&B

## 2023-02-16 PROCEDURE — 97129 THER IVNTJ 1ST 15 MIN: CPT

## 2023-02-16 PROCEDURE — 82948 REAGENT STRIP/BLOOD GLUCOSE: CPT

## 2023-02-16 PROCEDURE — 94760 N-INVAS EAR/PLS OXIMETRY 1: CPT

## 2023-02-16 PROCEDURE — 6370000000 HC RX 637 (ALT 250 FOR IP): Performed by: PHYSICAL MEDICINE & REHABILITATION

## 2023-02-16 PROCEDURE — 51798 US URINE CAPACITY MEASURE: CPT

## 2023-02-16 PROCEDURE — 99232 SBSQ HOSP IP/OBS MODERATE 35: CPT | Performed by: PHYSICAL MEDICINE & REHABILITATION

## 2023-02-16 RX ADMIN — INSULIN GLARGINE 14 UNITS: 100 INJECTION, SOLUTION SUBCUTANEOUS at 00:02

## 2023-02-16 RX ADMIN — TRAMADOL HYDROCHLORIDE 100 MG: 50 TABLET, COATED ORAL at 18:17

## 2023-02-16 RX ADMIN — FOLIC ACID 1 MG: 1 TABLET ORAL at 10:45

## 2023-02-16 RX ADMIN — RIVAROXABAN 20 MG: 20 TABLET, FILM COATED ORAL at 17:17

## 2023-02-16 RX ADMIN — Medication 6 MG: at 21:33

## 2023-02-16 RX ADMIN — SODIUM CHLORIDE, PRESERVATIVE FREE 10 ML: 5 INJECTION INTRAVENOUS at 21:39

## 2023-02-16 RX ADMIN — QUETIAPINE FUMARATE 25 MG: 25 TABLET ORAL at 21:34

## 2023-02-16 RX ADMIN — INSULIN GLARGINE 28 UNITS: 100 INJECTION, SOLUTION SUBCUTANEOUS at 21:35

## 2023-02-16 RX ADMIN — DOCUSATE SODIUM 100 MG: 100 CAPSULE, LIQUID FILLED ORAL at 10:45

## 2023-02-16 RX ADMIN — TRAZODONE HYDROCHLORIDE 100 MG: 100 TABLET ORAL at 21:33

## 2023-02-16 RX ADMIN — SENNOSIDES 17.2 MG: 8.6 TABLET, FILM COATED ORAL at 21:33

## 2023-02-16 RX ADMIN — MICONAZOLE NITRATE: 20 POWDER TOPICAL at 21:52

## 2023-02-16 RX ADMIN — FUROSEMIDE 20 MG: 20 TABLET ORAL at 10:45

## 2023-02-16 RX ADMIN — METOPROLOL SUCCINATE 25 MG: 25 TABLET, FILM COATED, EXTENDED RELEASE ORAL at 10:46

## 2023-02-16 RX ADMIN — ACETAMINOPHEN 650 MG: 325 TABLET ORAL at 03:03

## 2023-02-16 RX ADMIN — DICLOFENAC SODIUM 2 G: 10 GEL TOPICAL at 17:17

## 2023-02-16 RX ADMIN — ASPIRIN 81 MG 81 MG: 81 TABLET ORAL at 10:45

## 2023-02-16 RX ADMIN — DICLOFENAC SODIUM 2 G: 10 GEL TOPICAL at 21:41

## 2023-02-16 RX ADMIN — DOCUSATE SODIUM 100 MG: 100 CAPSULE, LIQUID FILLED ORAL at 21:33

## 2023-02-16 RX ADMIN — FLUCONAZOLE 200 MG: 200 TABLET ORAL at 10:45

## 2023-02-16 RX ADMIN — Medication 1 TABLET: at 10:45

## 2023-02-16 RX ADMIN — MICONAZOLE NITRATE: 20 POWDER TOPICAL at 10:54

## 2023-02-16 RX ADMIN — TRAMADOL HYDROCHLORIDE 100 MG: 50 TABLET, COATED ORAL at 05:56

## 2023-02-16 RX ADMIN — ACETAMINOPHEN 650 MG: 325 TABLET ORAL at 15:07

## 2023-02-16 RX ADMIN — DICLOFENAC SODIUM 2 G: 10 GEL TOPICAL at 10:45

## 2023-02-16 RX ADMIN — TRAMADOL HYDROCHLORIDE 100 MG: 50 TABLET, COATED ORAL at 12:19

## 2023-02-16 RX ADMIN — ACETAMINOPHEN 650 MG: 325 TABLET ORAL at 21:32

## 2023-02-16 RX ADMIN — ACETAMINOPHEN 650 MG: 325 TABLET ORAL at 09:57

## 2023-02-16 RX ADMIN — PANTOPRAZOLE SODIUM 40 MG: 40 TABLET, DELAYED RELEASE ORAL at 05:58

## 2023-02-16 RX ADMIN — ROSUVASTATIN 10 MG: 10 TABLET, FILM COATED ORAL at 21:33

## 2023-02-16 RX ADMIN — NALOXEGOL OXALATE 12.5 MG: 12.5 TABLET, FILM COATED ORAL at 05:58

## 2023-02-16 ASSESSMENT — PAIN - FUNCTIONAL ASSESSMENT
PAIN_FUNCTIONAL_ASSESSMENT: ACTIVITIES ARE NOT PREVENTED
PAIN_FUNCTIONAL_ASSESSMENT: PREVENTS OR INTERFERES SOME ACTIVE ACTIVITIES AND ADLS
PAIN_FUNCTIONAL_ASSESSMENT: ACTIVITIES ARE NOT PREVENTED
PAIN_FUNCTIONAL_ASSESSMENT: PREVENTS OR INTERFERES SOME ACTIVE ACTIVITIES AND ADLS
PAIN_FUNCTIONAL_ASSESSMENT: ACTIVITIES ARE NOT PREVENTED

## 2023-02-16 ASSESSMENT — PAIN SCALES - GENERAL
PAINLEVEL_OUTOF10: 8
PAINLEVEL_OUTOF10: 10
PAINLEVEL_OUTOF10: 10
PAINLEVEL_OUTOF10: 8
PAINLEVEL_OUTOF10: 8
PAINLEVEL_OUTOF10: 4
PAINLEVEL_OUTOF10: 8
PAINLEVEL_OUTOF10: 10
PAINLEVEL_OUTOF10: 7

## 2023-02-16 ASSESSMENT — PAIN DESCRIPTION - LOCATION
LOCATION: HIP;LEG
LOCATION: HIP;LEG
LOCATION: HIP

## 2023-02-16 ASSESSMENT — PAIN DESCRIPTION - ORIENTATION
ORIENTATION: RIGHT

## 2023-02-16 ASSESSMENT — ENCOUNTER SYMPTOMS
VOMITING: 0
SORE THROAT: 0
TROUBLE SWALLOWING: 0
RHINORRHEA: 0
CONSTIPATION: 0
BACK PAIN: 1
ABDOMINAL PAIN: 0
COUGH: 0
DIARRHEA: 0
NAUSEA: 0
SHORTNESS OF BREATH: 0
WHEEZING: 0

## 2023-02-16 ASSESSMENT — PAIN DESCRIPTION - DESCRIPTORS
DESCRIPTORS: ACHING;DISCOMFORT;SHARP
DESCRIPTORS: ACHING;DISCOMFORT
DESCRIPTORS: ACHING

## 2023-02-16 NOTE — PROGRESS NOTES
Called to patient room regarding patient concern that she could not breathe, patient did not have inner cannula in and humidification bottle was full. Passe martin valve placed to allow patient to describe what she was feeling; she was unable to tolerate inner cannula when placed, patient stated it made breathing difficult, patient permitted me to suction, obtained small amount of white mucous, re-attempted inner cannula, not tolerated so removed, humidification maintained, respiratory notified, patient was anxious, respirations easy.

## 2023-02-16 NOTE — PROGRESS NOTES
Patient: Keith Ayala  Unit/Bed: 1I-86/979-I  YOB: 1950  MRN: [id number] Acct: [de-identified]   Admitting Diagnosis: Closed intertrochanteric fracture, right, initial encounter [facility]) [facility]  Admit Date:  2/10/2023  Hospital Day: 5    Assessment:     Principal Problem:    Closed intertrochanteric fracture, right, initial encounter [facility])  Active Problems:    S/P tracheoplasty    Pulmonary embolism on right ([address]    Deep vein thrombosis (DVT) of both lower extremities ([facility]    Hyperlipidemia    Compression of lumbar vertebra ([facility]    Anemia associated with acute blood loss    Osteoporosis of lumbar spine    Debility    Physical deconditioning    Essential hypertension    Type 2 diabetes mellitus with insulin therapy ([facility]    Morbid obesity with BMI of 40.0-44.9, adult ([facility]  Resolved Problems:    * No resolved hospital problems. *      Plan:     Check lab  Stop the iron to see if that helps        Subjective:     Patient has complaints of nausea that affects her ability to eat. She feels she is moving her bowels and we reviewed that she has tolerated tramadol in the past instead of the oxycodone. .   Medication side effects: none    Scheduled Meds:   nitrofurantoin (macrocrystal-monohydrate)  100 mg Oral 2 times per day    fluconazole  200 mg Oral Daily    pantoprazole  40 mg Oral QAM AC    traZODone  100 mg Oral Nightly    rivaroxaban  20 mg Oral Daily    multivitamin  1 tablet Oral Daily    ferrous sulfate  325 mg Oral Daily with breakfast    rosuvastatin  10 mg Oral Daily    senna  2 tablet Oral Nightly    sodium chloride flush  5-40 mL IntraVENous 2 times per day    furosemide  20 mg Oral Daily    lidocaine  2 patch TransDERmal Daily    insulin glargine  28 Units SubCUTAneous Nightly    acetaminophen  650 mg Oral Q6H    docusate sodium  100 mg Oral BID    aspirin  81 mg Oral Daily    docusate sodium  1 enema Rectal Daily    folic acid  1 mg Oral Daily    metoprolol succinate  25 mg Oral Daily naloxegol  12.5 mg Oral QAM AC    polyethylene glycol  17 g Oral Daily    melatonin  6 mg Oral Nightly    QUEtiapine  25 mg Oral Nightly    miconazole   Topical BID     Continuous Infusions:   sodium chloride      dextrose       PRN Meds:simethicone, traMADol **OR** traMADol, diclofenac sodium, albuterol sulfate HFA, Menthol, guaiFENesin, sodium chloride flush, sodium chloride, potassium chloride **OR** potassium alternative oral replacement **OR** potassium chloride, magnesium sulfate, glucose, dextrose bolus **OR** dextrose bolus, glucagon (rDNA), dextrose, acetaminophen, bisacodyl, magnesium hydroxide, ondansetron, polyethylene glycol, lactulose    Review of Systems  Pertinent items are noted in HPI.    Objective:     No data found.  I/O last 3 completed shifts:  In: 900 [P.O.:900]  Out: 1850 [Urine:1850]  No intake/output data recorded.    BP (!) 90/58   Pulse 85   Temp 98.1 °F (36.7 °C) (Oral)   Resp 20   Ht 5' 2\" (1.575 m)   Wt 230 lb 6.1 oz (104.5 kg)   LMP  (LMP Unknown)   SpO2 100%   BMI 42.14 kg/m²     General appearance: alert, appears stated age, and cooperative  Head: Normocephalic, without obvious abnormality, atraumatic  Lungs: clear to auscultation bilaterally  Heart: regular rate and rhythm, S1, S2 normal, no murmur, click, rub or gallop  Abdomen: soft, non-tender; bowel sounds normal; no masses,  no organomegaly  Extremities: edema 1+ bilaterally  Skin: Skin color, texture, turgor normal. No rashes or lesions  Neurologic:  weak    The urine in the werner tubing is concentrated

## 2023-02-16 NOTE — PROGRESS NOTES
[facility]      Date:  2/16/2023            Patient Name: Princess Marks           MRN: [id number]  Kendrick: [de-identified]          YOB: 1950 (71 y.o.)       Gender: female   Diagnosis: closed intertrochanteric fracture, right  Physician: Referring Practitioner: Rigoberto Dugan MD    REASON FOR MISSED TREATMENT:  Pt was having some difficulty with her trach this am and respiratory therapy present to assist-will try her later to see how she feels to get her hair done by our beautician today    Xiao Andersonllor, [address] 2/16/2023

## 2023-02-16 NOTE — PROGRESS NOTES
[address]  Occupational Therapy  Daily Note  Time:    Time In: 1130  Time Out: 1230  Timed Code Treatment Minutes: 60 Minutes  Minutes: 60          Date: 2023  Patient Name: Vijay Keita,   Gender: female      Room: -55/055-A  MRN: [id number]  : 1950  (81 y.o.)  Referring Practitioner: Jian Tello MD  Diagnosis: closed intertrochanteric fracture, right  Additional Pertinent Hx: The patient was recently hospitalized from 2023 to 2023 initially for abdominal bloating, nausea and leg swelling. She was found to have elevated D-dimer. Subsequent testing revealed right lower lobe pulmonary embolism and right lower extremity DVT. She was at initially treated with IV heparin and later transition to [address] [place] [address]. The patient was brought to ER on 2022 because of progressively increased leg swelling, and nausea/vomiting associated with decreased oral intake. She was found to have BNP of 256.7 and Bumex was prescribed. She then had a fall accident  when she was going to toilet while she was in ER on 2022. The fall resulted severe right hip pain. X-ray of right hip and pelvis revealed acute right femoral intertrochanteric fracture with subtrochanteric extension. Orthopedic service was consulted. Xarelto was held in preparation for surgical management. Cardiology was consulted on 2023 for elevated BNP and Lasix was started. Because of difficulty voiding with urinary retention, urology was also consulted on 2023. Urology service placed Wen with some difficulty on 2023. Therefore Wen was kept in place. On 2/3/2023 the patient underwent open treatment of right intertrochanteric fracture with cephalomedullary nail by Dr. Jerad Boggs. She is allowed weightbearing as tolerated at the right lower extremity postoperatively.     Restrictions/Precautions:  Restrictions/Precautions: General Precautions, Fall Risk, Weight Bearing  Right Lower Extremity Weight Bearing: Weight Bearing As Tolerated  Position Activity Restriction  Other position/activity restrictions: Tracheostomy/collar 6L.  With venturi mask for out of room use 8 liters portable O2.     SUBJECTIVE: Pt was up in w/c upon arrival, agreeable to OT. Lethargic first half of session, increasingly alert and engaged during second half of session.     PAIN: c/o low back pain    Vitals: Vitals not assessed per clinical judgement, see nursing flowsheet    COGNITION: Decreased Insight, Decreased Problem Solving, and Decreased Safety Awareness    ADL:   Upper Extremity Dressing: Minimal Assistance.  Changing dresses after small incont of bowel  Toileting: Moderate Assistance.  For hygiene after large BM  Toilet Transfer: Moderate Assistance. With Jose Sweeney, to/from [facility] .    BALANCE:  Sitting Balance:  Stand By Assistance. On EOB, Mod I up in w/c  Standing Balance: Contact Guard Assistance. With heavy reliance on B forearms, VC for upright posture. Pt stood x2min    BED MOBILITY:  Sit to Supine: Moderate Assistance to guide BLE onto bed  Scooting: Minimal Assistance to scoot hips laterally    TRANSFERS:  Sit to Stand:  Minimal Assistance, Moderate Assistance. Within jose sweeney, VC for technique and anterior translation  Stand to Sit: Minimal Assistance. From jose sweeney, to [facility] and EOB    ADDITIONAL ACTIVITIES:  Patient completed BUE strengthening exercises with skilled education on HEP: completed x10 reps x1 set with a moderate resistance band while seated up in w/c in all joints and all planes in order to improve UE strength and activity tolerance required for BADL routine and toilet / shower transfers. Patient tolerated fair, requiring min rest breaks. Patient also required min cues for technique.         ASSESSMENT:     Activity Tolerance:  Patient tolerance of  treatment: Fair treatment tolerance, initially limited by fatigue and increased lethargy      Discharge  Recommendations: Continue to assess pending progress and Patient would benefit from continued OT at discharge  Equipment Recommendations: Other: Monitor pending progress  Plan: Times Per Week: 5x per week for 90 minutes  Times Per Day: Once a day  Current Treatment Recommendations: Balance training, Functional mobility training, Endurance training, Self-Care / ADL, Safety education & training, Patient/Caregiver education & training, Strengthening    Patient Education  Patient Education: Plan of Care, Precautions, Reviewed Prior Education, and Importance of Increasing Activity    Goals  Short Term Goals  Time Frame for Short Term Goals: until discharge  Short Term Goal 1: Pt will tolerate 12-15 min EOB ADL task with S to improve trunk control and activity tolerance required for EOB ADLs. Short Term Goal 2: Pt will complete sit-stand t/fs with mod A x 1 with minimal cues of technique to progress towards BSC t/fs  Short Term Goal 3: Pt will tolerate standing 1 min with min A for increased ease of clothing management nad LB bathing routine  Short Term Goal 4: Pt will tolerate further assessment of functional t/fs by OTR when appropriate  Long Term Goals  Time Frame for Long Term Goals : 2 weeks from [facility] OT eval  Long Term Goal 1: Pt will complete sit-stand t/fs with SBA x 1 with minimal cues of technique to progress towards BSC t/fs  Long Term Goal 2: Pt will perform UB/LB dressing and bathing with Min A and minimal cues for ECT to improve functional independence. Following session, patient left in safe position with all fall risk precautions in place.

## 2023-02-16 NOTE — PROGRESS NOTES
In wheelchair. States aching pain rated a 7 out of 10 in right hip. Tramadol as ordered was given at 1219 . ERLIN 3mm to 2mm, sluggish. Mucous membranes pink, moist. Pt speaks with a trach plug. Smile symmetrical, tongue midline. Apical pulse distant, regular. Lung sounds clear anterior, posterior and laterally. Respirations shallow and unlabored. Productive cough of clear sputum noted. Bowel sounds active all 4 quadrants. Abdomen round and firm. Last BM was 2-16-23. Pt has Wen catheter inserted. Dark irving urine noted in drainage bag. Radial pulses weak, irregular. Hand grasp equal bilaterally. Arm drift negative bilaterally. Skin warm and dry. Healing scattered bruising noted on both arms. PICC line on right upper arm, no swelling, redness or pain noted. Capillary refill less than 3 seconds, skin turgor sluggish. Pedal pulses faint, irregular. Pedal push and pull strong on left, weak on right. Leg lift strong on left, weak on right. Jeanne's sign negative bilaterally. Right lateral upper leg incision noted, dressing intact with minimal dark colored drainage. Denies numbness and tingling. Family in room, bedside table and call light within reach. Denies further needs at this time.

## 2023-02-16 NOTE — PROGRESS NOTES
Physical Medicine & Rehabilitation Progress Note    Chief Complaint:  Right hip fracture, low back compression fracture    Subjective:    Kaleigh Sierra is a 72 y.o. 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       The patient previously was admitted to inpatient rehab service from 2022 to 2022 due to critical care myopathy and debility/physical decondition as result of COVID-19 pneumonia.  She was discharged to home with referral for home care service on 2022.     The patient developed progressive difficulty breathing in 2022 and was found to have glottic stenosis and eventually received tracheostomy by Dr. Nathaniel Machado on 2022.  The patient's  says the patient also developed progressively worsening lower back pain in summer 2022 and saw orthopedics surgeon at [facility].  The patient has been says the patient was diagnosed of having \"lumbar spine stress fracture\".  No surgical intervention was performed.  She was treated with brace which she later abandoned due to discomfort associate with brace usage.     The patient was recently hospitalized from 2023 to 2023 initially for  abdominal bloating, nausea and leg swelling. She was found to have elevated D-dimer. Subsequent testing revealed right lower lobe pulmonary embolism and right lower extremity DVT. She was at initially treated with IV heparin and later transition to [address] [place] [address]. The patient was brought to ER on 2/1/2022 because of progressively increased leg swelling, and nausea/vomiting associated with decreased oral intake. She was found to have BNP of 256.7 and Bumex was prescribed. She then had a fall accident  when she was going to toilet while she was in ER on 2/1/2022. The fall resulted severe right hip pain. X-ray of right hip and pelvis revealed acute right femoral intertrochanteric fracture with subtrochanteric extension. Orthopedic service was consulted. Xarelto was held in preparation for surgical management. Cardiology was consulted on 2/2/2023 for elevated BNP and Lasix was started. Because of difficulty voiding with urinary retention, urology was also consulted on 2/2/2023. Urology service placed Wen with some difficulty on 2/2/2023. Therefore Wen was kept in place. On 2/3/2023 the patient underwent open treatment of right intertrochanteric fracture with cephalomedullary nail by Dr. Jimbo Clifton. She is allowed weightbearing as tolerated at the right lower extremity postoperatively. PM&R was consulted on 2/5/2023. During the evaluation the patient's  said that the patient began having progressively worsened low back pain starting in summer 2022 began interfering the patient's daily function. Therefore she is sore orthopedic physician at [facility]. Imaging tests were done and the patient was told that she had \" stress fracture\" in her spine. She was provided with a lumbar spine brace to wear but she was not compliant with spine brace application because of discomfort it produced while she was wearing it.   Because no outside lumbar spine images study report was available for review, x-ray of lumbar spine was ordered and performed on 2/6/2023 and showed moderate vertebral body spondylosis in the lumbar and lower thoracic spine, moderate degenerative facet arthropathy involving at least lower 3 lumbar levels, lumbar spine osteoporosis, mild superior endplate depression fracture at L2, L3 and L4. Lumbar spine MRI was recommended by radiologist.  Therefore primary team ordered lumbar spine MRI which was performed this afternoon revealing acute fracture with a fluid cleft associated with endplate at D8-O3 levels with mild height loss at each vertebral bodies. The patient was found to have severe anemia with Hgb/Hct dropped from 7.3/24 on 2/4/2023 down to 5.1/16.9 on 2/6/2023. Therefore she was given blood transfusion for few units. CT of abdomen and pelvis was ordered to rule out possible intra-abdominal source of blood loss. CT abdomen and pelvis done on 2/8/2023 show only pericolonic inflammation with diverticula related to acute diverticulitis, acute on chronic compression fracture of L2, L3, L4 and L5, mild, and marked osteopenia. The patient's hospital course also complicated by intermittent confusion, and development of gluteus region skin breakdown. The patient says she feels well. She says her abdomen is not hurting as much as before. Her right-sided low back pain is better now after she started using Voltaren gel. She is still complaining of significant pain at her right hip and groin with intensity rated at 9/10 level. She took tramadol 100 mg 3 times yesterday. She says Voltaren gel application and ice pack application also help to reduce the right hip and right groin pain. She says her right lower extremity still feel heavy to move. She says she is moving her bowel. She continues tolerating the intensive inpatient rehabilitation treatment. The patient is projected be ready for discharge on 2/24/2023      Rehabilitation:  PT: Reviewed.     Bed Mobility:  Supine to Sit: Moderate Assistance to fully clear RLE and at trunk to elevate. Bed flat, 1 rail used  Scooting: Moderate Assistance, using 1/2 bridges and rail assist to scoot laterally approx 2 \"     Transfers:  Sit to Stand: Minimal Assistance, Moderate Assistance, X 2 with Fatemeh Stai  Stand to Sit:Minimal Assistance, X 2 with Fatemeh Stai  To/From Bed and Chair: Dependent, with Fatemeh Stai  *Verbal cues for foot positioning and posture  *Pt stood multiple times, requiring rest breaks from standing while completing hygiene after bowel movement. W/c to recliner:  Dependent x1 with use of JEANNETTE stedy    Ambulation:  Not tested     Balance:  Static Standing Balance: Contact Guard Assistance with Fatemeh Stai     Wheelchair Mobility:  Supervision  Extremities Used: Bilateral Upper Extremities  Type of Chair:Manual  Surface: Level Tile and with turns to ea direction and 180 degree turn  Distance: 120 ft  Quality: Slow Velocity and Short Strokes     Stand By Assistance  Extremities Used: Bilateral Upper Extremities  Type of Chair:Manual  Surface: Level Tile  Distance: 45'  Quality: Slow Velocity, Short Strokes, and Decreased Fluidity   *Increased time to complete  *Verbal cues for increased shoulder extension for increased movement in wheelchair        OT: Reviewed. ADL:   Grooming: with set-up. Combed hair sitting on [facility]  Upper Extremity Dressing: Minimal Assistance. Changing dresses after small incont of bowel  Footwear Management: Dependent. Jani B socks . Toileting: Moderate Assistance. For hygiene after large BM  Toilet Transfer: Minimal Assistance. To [facility] from Fatemeh Hull . BALANCE:  Sitting Balance:  Supervision. In w/c   Standing Balance: Contact Guard Assistance, X 1, with cues for safety, with verbal cues , with increased time for completion.  X 2 min standing within jeannette stedy x 2 trials with seated rest break between each trial in order to retrieve rings at various heights to address balance, reaching out of base of support, strength, increase activity tolerance to increase independence with Adls/IADls. Patient demonstrates leaning on forearm d/t decreased strength with maximal verbal cues for upright posture. Standing Balance: Contact Guard Assistance. With heavy reliance on B forearms, VC for upright posture. Pt stood x2min    BED MOBILITY:  Sit to Supine: Moderate Assistance to guide BLE onto bed  Scooting: Minimal Assistance to scoot hips laterally     TRANSFERS:  Sit to Stand:  Maximum Assistance, X 1, with Suellyn Combe, with increased time for completion, cues for hand placement, with verbal cues. X 4 trials  Stand to Sit: Contact Guard Assistance, X 1, with increased time for completion, cues for hand placement, with verbal cues. Within jose haider     FUNCTIONAL MOBILITY:  Assistive Device: Wheelchair  Assist Level:  Dependent. Distance: To and from therapy gym    ADDITIONAL ACTIVITIES:  Patient completed BUE strengthening exercises with skilled education on HEP: completed x10 reps x1 set with a moderate resistance band while seated up in w/c in all joints and all planes in order to improve UE strength and activity tolerance required for BADL routine and toilet / shower transfers. Patient tolerated fair, requiring min rest breaks. Patient also required min cues for technique. ST: Reviewed. Medication Management - Pill Box Organization   Prescription 1 - 1 tablet daily   Comprehension: independent   Completion: min cues, patient initially organizing medication from Thursday throughout Saturday despite verbal instructions to organize entire week. Prescription 2 - 1 tablet daily in the evening. Completion: independent      Prescription 3 - 2 tablets, twice daily with meals. Comprehension: independent   Completion: min cues, incorrect number of pills in 1 PM slot.  Independent ID of error when given cue to review organization     Prescription 4 - take 1 tablet every 6-8 hours PRN   Comprehension: independent identification that PRN medication would NOT be organized into pill box; however, poor math computation to determine next available dose in 3/3 attempts. *level of fatigue impacting success within task this date; however, evidence of increased confusion throughout - patient with decreased insight to rationale for task, utilization of beads vs pills, thinking patient was going to consume medications, etc.   *decreased reasoning, thought organization, and attention noted throughout task. *required extended time this date given slow processing speed and decreased levels of alertness    Immediate/Delayed Recall - Grocery List (Milk, Eggs, Apples, Steak, Toilet Paper, Tylenol)  ST completed review of STM strategies using the acronym WRAP--Write it down, Repeat it, Associate it, and Pictures it. Patient utilized write it down and repeat it to assist with recall of 6 unit grocery list.     Immediate Recall: 5/6 independently, 1/6 given min cues   Delayed Recall (10 minutes): 4/6 independently, 2/6 given min cues to utilize list     Sequencing 4 Step ADLs  5/8 independently, 2/8 given min cues, 1/8 given mod cues   *decreased reasoning and thought organization within task  *patient often stating, \"that's not the order I would do it\"; however, with steps listed, correct sequence evident with appropriate reasoning skills. Sustained/Divided Attention  Patient was prompted to say \"ping\" for red suit and \"pong\" for black suit. Trial 1: Patient independently identified \"ping\" or \"pong\" correctly 46/52 cards (x6 errors), and completed task in 1:46 minutes. Trial 2: Patient independently identified \"ping\" or \"pong\" correctly 48/52 cards (x4 errors), and completed task in 2:00 minutes. Review of Systems:  Review of Systems   Constitutional:  Positive for fatigue. Negative for appetite change, chills, diaphoresis and fever.    HENT:  Negative for hearing loss, rhinorrhea, sneezing, sore throat and trouble swallowing. Eyes:  Negative for visual disturbance. Respiratory:  Negative for cough, shortness of breath and wheezing. Cardiovascular:  Negative for chest pain and palpitations. Gastrointestinal:  Negative for abdominal pain, constipation, diarrhea, nausea and vomiting. Genitourinary:  Negative for dysuria. Musculoskeletal:  Positive for arthralgias (Bilateral hip, right groin, and right thigh), back pain (Right-sided lower back), gait problem and myalgias (Right thigh). Negative for neck pain. Skin:  Negative for rash. Neurological:  Positive for weakness (Bilateral lower extremities especially on the right side) and numbness (Bilateral feet). Negative for dizziness, tremors, seizures, speech difficulty, light-headedness and headaches. Psychiatric/Behavioral:  Negative for dysphoric mood, hallucinations and sleep disturbance. The patient is not nervous/anxious.          Objective:  /60   Pulse 85   Temp 98.4 °F (36.9 °C) (Oral)   Resp 18   Ht 5' 2\" (1.575 m)   Wt 204 lb 1.6 oz (92.6 kg)   LMP  (LMP Unknown)   SpO2 100%   BMI 37.33 kg/m²   Physical Exam   General:  well-developed, well nourished morbid obese  female ; in no acute distress ; appropriate affect & mood; lying on bed comfortably; receiving humidified oxygen supplement via trach collar; speaking in whispered manner due to presence of tracheostomy  Eyes: pupil equally round ; extra-ocular motion intact bilaterally  Head, Ear, Nose, Mouth & Throat : normocephalic ; no discharge from ears or nose ; no deformity ; no facial swelling ; oral mucosa pink  Neck :  supple ; presence of tracheostomy at anterior neck; no tenderness; mild muscle spasm at cervical paraspinal muscle and upper trapezius muscle  Cardiovascular : regular rate & rhythm ; normal S1 & S2 heart sound ; no murmur ; normal peripheral pulse at bilateral upper & lower extremities  Pulmonary : Breath sounds present at bilateral lung fields; no wheezing ; no rale; no crackle  Gastrointestinal : soft, protruded abdomen; mild tenderness at epigastric area; normal bowel sound present    : Wen catheter in place draining yellowish urine  Back : Tenderness at the right lumbar region; no muscle spasm  Skin: no skin lesion or rash ; presence of adhesive dressing at right lateral upper and lower thigh; no pitting edema at bilateral upper extremities; 1+ pitting edema at right ankles and right leg; presence of PICC line on right arm near elbow area  Musculoskeletal : no limb asymmetry; no limb deformity; tenderness at right lateral upper thigh, right lateral hip and right groin area; no tenderness at bilateral upper extremities & the rest of bilateral lower extremities; no palpable mass at limbs ; right hip and right knee joint laxity not assessed; no joints laxity or crepitation at other limb joints; shoulder flexion and abduction passive ROM reaching 160 degrees bilaterally; right hip flexion passive ROM reaching 50 degrees and right knee flexion passive ROM reaching 45 degrees limited by pain at right hip and thigh; left hip flexion passive ROM reaching 90 degrees; ankle dorsiflexion passive ROM reaching 0 degrees bilaterally; otherwise normal functional joints ROM at the rest of bilateral upper & lower extremities  Cerebral :  alert ; awake ; oriented to place, person and time; follow 1-2 steps verbal command  Cerebellum : no dysmetria with bilateral finger-to-nose test ; unable to perform heel-to-shin test on the right side; dysmetria with left heel-to-shin test  Cranial nerve : CN II to XII function grossly intact  Sensory : intact light touch and pin prick sensation at bilateral upper extremities; reduced light touch and pinprick sensation at distal bilateral lower extremities from upper leg region downward in sock distribution  Motor : normal tone at bilateral upper & left lower extremities ; muscle tone not assessed on right lower extremity due to the pain; 2-/5 muscle strength at right hip flexion, abduction and adduction; 4-/5 muscle strength at the left hip flexion; 3-/5 muscle strength at right knee extension ; 2+/5 muscle strength at the right knee flexion; 4+/5 muscle strength at the left knee flexion ; otherwise 5/5 muscle strength at the rest of bilateral upper and the lower extremities  Reflex : 1+ bilateral brachioradialis reflexes; 0 bilateral biceps and bilateral knees reflexes   Pathological Reflex :  No Roberta's sign ; no ankle clonus  Gait : Not assessed      Diagnostics:   Recent Results (from the past 24 hour(s))   Lipase    Collection Time: 02/15/23 11:15 PM   Result Value Ref Range    Lipase 39.7 5.6 - 51.3 U/L   Hepatic Function Panel    Collection Time: 02/15/23 11:15 PM   Result Value Ref Range    Albumin 2.7 (L) 3.5 - 5.1 g/dL    Total Bilirubin 0.4 0.3 - 1.2 mg/dL    Bilirubin, Direct <0.2 0.0 - 0.3 mg/dL    Alkaline Phosphatase 131 (H) 38 - 126 U/L    AST 14 5 - 40 U/L    ALT <5 (L) 11 - 66 U/L    Total Protein 5.0 (L) 6.1 - 8.0 g/dL   POCT glucose    Collection Time: 02/16/23  7:22 AM   Result Value Ref Range    POC Glucose 105 70 - 108 mg/dl   Basic Metabolic Panel w/ Reflex to MG    Collection Time: 02/16/23 12:45 PM   Result Value Ref Range    Sodium 141 135 - 145 meq/L    Potassium reflex Magnesium 3.6 3.5 - 5.2 meq/L    Chloride 105 98 - 111 meq/L    CO2 22 (L) 23 - 33 meq/L    Glucose 94 70 - 108 mg/dL    BUN 10 7 - 22 mg/dL    Creatinine 0.6 0.4 - 1.2 mg/dL    Calcium 8.6 8.5 - 10.5 mg/dL   Anion Gap    Collection Time: 02/16/23 12:45 PM   Result Value Ref Range    Anion Gap 14.0 8.0 - 16.0 meq/L   Glomerular Filtration Rate, Estimated    Collection Time: 02/16/23 12:45 PM   Result Value Ref Range    Est, Glom Filt Rate >60 >60 ml/min/1.73m2       Urine culture (2/14/2023) :  Component 2/14/23 8903    Organism Candida albicans Abnormal     Urine Culture Reflex [place] count: >100,000 CFU/mL        Impression:  Right femoral intertrochanteric fracture with subtrochanteric extension due to fall requiring open reduction and internal fixation with cephalomedullary nail  Glottic stenosis requiring tracheostomy  Persistent chronic low back pain due to lumbar and lower thoracic spine spondylosis with degenerative facet arthropathy at the lower 3 lumbar levels, and acute L2-S1 endplate fractures  Right posterior tibial, left greater saphenous, left peroneal and left anterior tibial veins deep vein thrombosis, and right lower lobe pulmonary embolism  Acute anemia requiring blood transfusion  Urinary retention due to urethra obstruction requiring Wen cath placement   Candida albicans UTI  Lumbar spine osteoporosis  Gluteal region decubitus ulcer  Diabetes mellitus type 2 with poor blood glucose control and complicated by bilateral lower extremities diabetic polyneuropathy  Morbid obesity  Hypertension  Hyperlipidemia  History of lower back pain with history of \"lumbar stress fracture\"  History of COVID infection with pneumonia  History of mild cognitive impairment  History of coronary artery disease requiring coronary artery stenting  Grade 1 left ventricular diastolic dysfunction with preserved ejection fraction     The patient's condition remains stable. She continues having significant pain at her right hip and right thigh which is controlled with tramadol, ice pack and Voltaren gel usage. Her right-sided low back pain is better with Voltaren gel application. .  Her right lower extremity remains weak. She continues tolerating the intensive inpatient rehabilitation treatment. Her function is improving very slowly. Currently the patient is projected to be ready for discharge on 2/24/2023      Plan:  Continues intensive PT/OT/SLP/RT inpatient rehabilitation program at least 3 hours per day, 5 days per week in order to improve functional status prior to discharge. Family education and training will be completed.   Equipment evaluations and recommendations will be completed as appropriate. Rehabilitation nursing continues to be involved for bowel, bladder, skin, and pain management. Nursing will also provide education and training to patient and family. Prophylaxis:  DVT: Patient on Xarelto, KENA stocking, intermittent pneumatic compression device.   GI: Colace, Enemeez enema, GlycoLax, Senokot, lactulose as needed, milk of magnesium as needed; add Movantik  Pain: Tylenol,  lidocaine patch, Voltaren gel as needed, tramadol as needed  Continue aspirin for coronary artery disease  Continue Lasix for lower extremities edema  Continue Lantus insulin, Humalog insulin, Humalog insulin coverage for diabetes mellitus  Continue Seroquel for anxiety and insomnia   Continue Xarelto for bilateral DVT and pulmonary embolism  Continue Toprol-XL for hypertension  Continue Crestor for hyperlipidemia  Continue melatonin, trazodone as needed for insomnia; increase trazodone dosage to 100 mg  Continue Protonix for gastric protection  Continue simethicone as needed for indigestion  Continue multivitamin and ferrous sulfate for anemia  Continue with Diflucan 200 mg daily for 2 weeks for Candida albicans yeast UTI  Nutrition: Continue current diet  Bladder: Monitoring signs or symptoms of UTI  Bowel: Monitoring signs or symptoms of constipation   and case management for coordination of care and discharge planning      Missed Therapy Time:  None      Peng Casarez MD

## 2023-02-16 NOTE — PROGRESS NOTES
[address] [facility] THERAPY  [facility]  DAILY NOTE    TIME   SLP Individual Minutes  Time In: 1430  Time Out: 1500  Minutes: 30  Timed Code Treatment Minutes: 30 Minutes       Date: 2023  Patient Name: Rita Pierre      CSN: [id number]   : 1950  (81 y.o.)  Gender: female   Referring Physician:  Dary Cobian MD  Diagnosis: Closed intertrochanteric fracture, right,  initial encounter  Precautions: Fall risk  Current Diet: Regular and Thin Liquids   Swallowing Strategies: Standard Universal Swallow Precautions  Date of Last MBS/FEES: Not Applicable    Pain:  56/ - Pain location: R Hip - RN aware; however, reporting no pain medications due at this time. ST offered to re-position patient and/or provide ice pack; however, patient refused. Subjective:  Patient sitting in wheelchair, awake with  at bedside. Patient agreeable to skilled ST services; however, reporting high levels of fatigue from therapy day. Patient easily frustrated and agitated within session this date. Short-Term Goals:  SHORT TERM GOAL #1:  REVISED: Goal 1: Patient will complete functional problem solving and reasoning tasks with 80% accuracy and min cuing in order to improve completion of ADLs/IADLs at discharge. INTERVENTIONS:   Medication Management - Pill Box Organization   Prescription 1 - 1 tablet daily   Comprehension: independent   Completion: min cues, patient initially organizing medication from Thursday throughout Saturday despite verbal instructions to organize entire week. Prescription 2 - 1 tablet daily in the evening. Completion: independent     Prescription 3 - 2 tablets, twice daily with meals. Comprehension: independent   Completion: min cues, incorrect number of pills in 1 PM slot.  Independent ID of error when given cue to review organization    Prescription 4 - take 1 tablet every 6-8 hours PRN   Comprehension: independent identification that PRN medication would NOT be organized into pill box; however, poor math computation to determine next available dose in 3/3 attempts. *level of fatigue impacting success within task this date; however, evidence of increased confusion throughout - patient with decreased insight to rationale for task, utilization of beads vs pills, thinking patient was going to consume medications, etc.   *decreased reasoning, thought organization, and attention noted throughout task. *required extended time this date given slow processing speed and decreased levels of alertness    SHORT TERM GOAL #2:  REVISED: Goal 2: Patient will complete functional immediate, working, and delayed recall tasks of 4+ units of information, with or without use of trained recall strategies, with 80% accuracy given min cues to improve retention of pertinent medical information. INTERVENTIONS: Did not address this session d/t focus on other goals. SHORT TERM GOAL #3:  REVISED: Goal 3: Patient will complete functional thought organization and sequencing exercises with 85% accuracy and min cuing  in order to improve  completion  of  ADLs/IADLs. INTERVENTIONS: Did not address this session d/t focus on other goals. SHORT TERM GOAL #4:  Goal 4: Patient will complete sustained, selective, alternating, and divided attention tasks with no more than three  errors/redirections in five minutes/one task in order to allow for safe return to driving at discharge. INTERVENTIONS: Did not address this session d/t focus on other goals. Long-Term Goals:  Time Frame for Long Term Goals: 3 weeks    LONG TERM GOAL #1:  Goal 1: Patient will improve cognitive  functioning  to a level of modified independent in order to allow for safe return to PLOF.           Comprehension: 5 - Patient understands basic needs (hungry/hot/pain)  Expression: 5 - Expresses basic ideas/needs only (hungry/hot/pain)  Social Interaction: 5 - Patient is appropriate with supervision/cues  Problem Solvin - Patient solves simple/routine tasks 75-90%+   Memory: 3 - Patient remembers 50%-74% of the time    EDUCATION:  Learner: Patient  Education:  Reviewed ST goals and Plan of Care and Education Related to [employer] and Wellness  Evaluation of Education: [employer] understanding, Demonstrates with assistance, and Needs further instruction    ASSESSMENT/PLAN:  Activity Tolerance:  Patient tolerance of  treatment: good. Assessment/Plan: Patient progressing toward established goals. Continues to require skilled care of licensed speech pathologist to progress toward achievement of established goals and plan of care. .     Plan for Next Session: sequencing, BASIC money management, attention   Discharge Recommendations: Outpatient Speech Therapy vs [address] [place] [facility]) [address] M[address]

## 2023-02-16 NOTE — PROGRESS NOTES
Patient suctioned for moderate amount very thick yellow/brown mucous, after suctioning, patient able to tolerate inner cannula, switched to portable O2 and taken to get hair washed. Resp easy, more relaxed.

## 2023-02-16 NOTE — PROGRESS NOTES
[address] [facility] PHYSICAL THERAPY  DAILY NOTE  [address] [facility],[address] Floor - [address]    Time In: 1000  Time Out: 1030  Timed Code Treatment Minutes: 30 Minutes  Minutes: 30          Date: 2023  Patient Name: Deven Alba,  Gender:  female        MRN: [id number]  : 1950  (81 y.o.)     Referring Practitioner: Sae Sanches MD  Diagnosis: closed intertrochanteric fx, Rt  Additional Pertinent Hx: Kiana Kennedy is a 73yoF with PMH of recent PE and b/l DVT, macrocytic anemia, CAD, DM2, tracheostomy dependent, obesity, and HTN who presents for leg swelling, nausea, and constipation. She was recently discharged after an 8 day admission for DVTs and PEs. She was placed on Xarelto when discharged and stated that she has been compliant and only missed one dose. She returned due to chronic constipation, feeling ''bloated'', nausea, and LE edema. While being evaluated in the ED the pt had a mechanical fall and developed a R femur fracture. She was found to have supratheraputic INR at 3.83. CT head and C-spine both clear. Pt is s/p R femur ORIF by Dr Wilver Mcdermott . Pt found to have acute fractures of L2-S1 likely 2/2 osteoporosis, ok for activity as tolerated per Dr Melvin Aponte . Prior Level of Function:  Lives With: Spouse  Type of Home: House  Home Layout: One level  Home Access: Ramped entrance (or 1 step with no rails.)  Home Equipment: Roetta Pro, rolling, Lift chair (Pt sleeps in lift chair and was using it to assist up to stand PTA)   Bathroom Shower/Tub: Walk-in shower  Bathroom Toilet: Standard  Bathroom Equipment: Built-in shower seat, 3-in-1 commode    ADL Assistance: Needs assistance  Homemaking Assistance: Needs assistance  Ambulation Assistance: Independent  Transfer Assistance: Independent  Active : No  Additional Comments: pt amb short distances in the home with RW,  available  and able to assist as needed.  Has a passi valve that she uses at rest.    Restrictions/Precautions:  Restrictions/Precautions: General Precautions, Fall Risk, Weight Bearing  Right Lower Extremity Weight Bearing: Weight Bearing As Tolerated  Position Activity Restriction  Other position/activity restrictions: Tracheostomy/collar 6L. With venturi mask for out of room use 8 liters portable O2. SUBJECTIVE: Pt seated in recliner. Pleasant and cooperative. PAIN: not rated/10: Current with pain meds    Vitals: Oxygen: with use of venturi mask:  8 liters O2. In room, 10 liters per respiratory in attempts to \"clean\" pt's trach to avoid having to change it. .      OBJECTIVE:  Bed Mobility:  Not Tested    Transfers:  Sit to Stand: Moderate Assistance, X 1, into JEANNETTE stedy from w/c  Stand to Sit:Minimal Assistance, X 1, from [address] stedy into the recliner  W/c to recliner:  Dependent x1 with use of JEANNETTE stedy    Wheelchair Mobility:  Supervision  Extremities Used: Bilateral Upper Extremities  Type of Chair:Manual  Surface: Level Tile and with turns to ea direction and 180 degree turn  Distance: 120 ft  Quality: Slow Velocity and Short Strokes     Functional Outcome Measures: Not completed       ASSESSMENT:  Assessment: Patient progressing toward established goals. Activity Tolerance:  Patient tolerance of  treatment: good.       Equipment Recommendations:Equipment Needed: No  Other: Pt has RW, manual w/c and mechanical lift device  Discharge Recommendations: Continue to assess pending progress and Patient would benefit from continued PT at discharge  Plan: Current Treatment Recommendations: Strengthening, Balance training, Functional mobility training, Transfer training, Endurance training, Equipment evaluation, education, & procurement, Patient/Caregiver education & training, Safety education & training, Therapeutic activities, Home exercise program, Wheelchair mobility training, Gait training, Pain management  General Plan:  (5x/ wk 90 min)    Patient Education  Patient Education: Transfers, Wheelchair Mobility,  - Patient Verbalized Understanding, - Patient Requires Continued Education    Goals:  Patient Goals : get around o my own without the RW  Short Term Goals  Time Frame for Short Term Goals: 1 wk  Short Term Goal 1: Pt will go from supine <->sit, mod +1 to get in/out of bed. Short Term Goal 2: Pt will get up/down from bed, into JEANNETTE stedy device, +1 mod assist to progress to up to RW  Short Term Goal 3: Pt will  the JEANNETTE stedy device, +1 min assist x5 min, to progress to standing with RW  Short Term Goal 4: Pt will propel w/c >= 50 ft, tile surface, CGA for home mobility  Long Term Goals  Time Frame for Long Term Goals : 3 wks from evaluation. Long Term Goal 1: Pt to go supine <->sit, min +1 to get in/out of bed. Long Term Goal 2: Pt to get up/down from bed or w/c +1 min assist to get up to walk. Long Term Goal 3: Pt to perform stand/pivot transfer with RW, +1 min assist to get in/out of w/c. Long Term Goal 4: Pt to walk with RW >= 10 ft, mn +1 to progress to home mobility  Long Term Goal 5: Pt to propel w/c >= 50 ft, Mod I, for home mobility  Additional Goals?: Yes  Long term goal 6: Pt to get in/out of car, min +1 for transportation needs. Following session, patient left in safe position with all fall risk precautions in place.

## 2023-02-16 NOTE — PROGRESS NOTES
[address] [facility] PHYSICAL THERAPY  DAILY NOTE  [facility]- [address] [facility],4Th Floor - [address]    Time In: 1400  Time Out: 1430  Timed Code Treatment Minutes: 30 Minutes  Minutes: 30          Date: 2023  Patient Name: Rita Pierre,  Gender:  female        MRN: [id number]  : 1950  (81 y.o.)     Referring Practitioner: Mayte Mauricio MD  Diagnosis: closed intertrochanteric fx, Rt  Additional Pertinent Hx: Sonia Martinez is a 73yoF with PMH of recent PE and b/l DVT, macrocytic anemia, CAD, DM2, tracheostomy dependent, obesity, and HTN who presents for leg swelling, nausea, and constipation. She was recently discharged after an 8 day admission for DVTs and PEs. She was placed on Xarelto when discharged and stated that she has been compliant and only missed one dose. She returned due to chronic constipation, feeling ''bloated'', nausea, and LE edema. While being evaluated in the ED the pt had a mechanical fall and developed a R femur fracture. She was found to have supratheraputic INR at 3.83. CT head and C-spine both clear. Pt is s/p R femur ORIF by Dr Yonathan Stacy . Pt found to have acute fractures of L2-S1 likely 2/2 osteoporosis, ok for activity as tolerated per Dr Dao Mclean . Prior Level of Function:  Lives With: Spouse  Type of Home: House  Home Layout: One level  Home Access: Ramped entrance (or 1 step with no rails.)  Home Equipment: emil Hedrick, Lift chair (Pt sleeps in lift chair and was using it to assist up to stand PTA)   Bathroom Shower/Tub: Walk-in shower  Bathroom Toilet: Standard  Bathroom Equipment: Built-in shower seat, 3-in-1 commode    ADL Assistance: Needs assistance  Homemaking Assistance: Needs assistance  Ambulation Assistance: Independent  Transfer Assistance: Independent  Active : No  Additional Comments: pt amb short distances in the home with RW,  available  and able to assist as needed.  Has a passi valve that she uses at rest.    Restrictions/Precautions:  Restrictions/Precautions: General Precautions, Fall Risk, Weight Bearing  Right Lower Extremity Weight Bearing: Weight Bearing As Tolerated  Position Activity Restriction  Other position/activity restrictions: Tracheostomy/collar 6L. With venturi mask for out of room use 8 liters portable O2. SUBJECTIVE: Pt seated in w/c. Just completed having trach replaced with respiratory and stated she was tired, but agreeable for therapy. PAIN: not rated/10: Current with pain meds. Vitals: Oxygen: 8 liters with use of venturi mask when out of room. 6 liters used in room per respiratory. OBJECTIVE:  Bed Mobility:  Not Tested    Transfers:  Sit to Stand: Moderate Assistance, +1, min +1 from w/c into parallel bars x2 trials. Pt pulled on bars to gain the upright. Stand to Sit:Moderate Assistance, X 1      Balance: Static stand with parallel bar support- mod +1, min +1. Cues and assist to gain more upright trunk. Bars adjusted 1\" lower with 2nd trial and moved closer together to allow for more UE support. Pt tolerated standing 40 sec and 30 sec respectively. Pt did tolerate min wt on RLE with standing. Manual and verbal cues for more upright trunk as tends to lean forward. Seated rest break needed b/w stands. Functional Outcome Measures: Not completed       ASSESSMENT:  Assessment: Patient progressing toward established goals. Activity Tolerance:  Patient tolerance of  treatment: good.       Equipment Recommendations:Equipment Needed: No  Other: Pt has RW, manual w/c and mechanical lift device  Discharge Recommendations: Continue to assess pending progress and Patient would benefit from continued PT at discharge  Plan: Current Treatment Recommendations: Strengthening, Balance training, Functional mobility training, Transfer training, Endurance training, Equipment evaluation, education, & procurement, Patient/Caregiver education & training, Safety education & training, Therapeutic activities, Home exercise program, Wheelchair mobility training, Gait training, Pain management  General Plan:  (5x/ wk 90 min)    Patient Education  Patient Education: Transfers,  - Patient Verbalized Understanding, - Patient Requires Continued Education    Goals:  Patient Goals : get around o my own without the RW  Short Term Goals  Time Frame for Short Term Goals: 1 wk  Short Term Goal 1: Pt will go from supine <->sit, mod +1 to get in/out of bed. Short Term Goal 2: Pt will get up/down from bed, into JEANNETTE stedy device, +1 mod assist to progress to up to RW  Short Term Goal 3: Pt will  the JEANNETTE stedy device, +1 min assist x5 min, to progress to standing with RW  Short Term Goal 4: Pt will propel w/c >= 50 ft, tile surface, CGA for home mobility  Long Term Goals  Time Frame for Long Term Goals : 3 wks from evaluation. Long Term Goal 1: Pt to go supine <->sit, min +1 to get in/out of bed. Long Term Goal 2: Pt to get up/down from bed or w/c +1 min assist to get up to walk. Long Term Goal 3: Pt to perform stand/pivot transfer with RW, +1 min assist to get in/out of w/c. Long Term Goal 4: Pt to walk with RW >= 10 ft, mn +1 to progress to home mobility  Long Term Goal 5: Pt to propel w/c >= 50 ft, Mod I, for home mobility  Additional Goals?: Yes  Long term goal 6: Pt to get in/out of car, min +1 for transportation needs. Following session, patient left in safe position with all fall risk precautions in place.

## 2023-02-16 NOTE — PROGRESS NOTES
Patient in bed resting with eyes closed. Some difficulty arousing. States aching pain rated an 8 out of 10 in right hip. Tramadol as ordered was given by Cuauhtemoc Sosa at 0600. ERLIN 3mm to 2mm, sluggish. Mucous membranes pink, moist. Pt speaks with a trach plug. Smile symmetrical, tongue midline. Apical pulse distant, regular. Lung sounds clear anterior, posterior and laterally. Respirations shallow and unlabored. Productive cough of clear sputum noted. Bowel sounds active all 4 quadrants. Abdomen round and firm. Red, irritated areas in abdominal folds noted. Miconazole powder applied as ordered. States passing gas. Last BM was 2-15-23. Pt has Wen catheter inserted. Dark irving urine noted in drainage bag. Radial pulses weak, irregular. Hand grasp equal bilaterally. Arm drift negative bilaterally. Skin warm and dry. Healing scattered bruising noted on both arms. PICC line on right upper arm, no swelling, redness or pain noted. Capillary refill less than 3 seconds, skin turgor sluggish. Pedal pulses faint, irregular. Pedal push and pull strong on left, weak on right. Leg lift strong on left, weak on right. Jeanne's sign negative bilaterally. Right lateral upper leg incision noted, dressing intact with minimal dark colored drainage. Denies numbness and tingling. Bed alarm on, bedside table and call light within reach. Denies further needs at this time.

## 2023-02-16 NOTE — PROGRESS NOTES
[address] [address]  Recreational Therapy  Daily Note  [address]    Time Spent with Patient: 30 minutes    Date:  2/16/2023       Patient Name: Rolan Buchanan      MRN: [id number]      YOB: 1950 (71 y.o.)       Gender: female  Diagnosis: closed intertrochanteric fracture, right  Referring Practitioner: Misty Powell MD    RESTRICTIONS/PRECAUTIONS:  Restrictions/Precautions: General Precautions, Fall Risk, Weight Bearing     Hearing: Within functional limits    PAIN: 0    SUBJECTIVE:  Oh this feels like heaven     OBJECTIVE:  Pt enjoyed getting her hair washed, trimmed and styled by our beautician this am-affect bright and social-so appreciative          Patient Education  New Education Provided: Importance of Leisure,     Electronically signed by: LIBBY Lopez  Date: 2/16/2023

## 2023-02-16 NOTE — PROGRESS NOTES
Tracheostomy tube currently in place:  Size 6  Type  []Shiley adult flexible tracheostomy tube with taperguard cuff   []Shiley disposable cannula cuffed tracheostomy tube (DCT)   []Shiley diposable cannula fenestrated cuffed tracheostomy tube (DFEN)  []Shiley disposable cannula cuffed percutaneous tracheostomy tube (PERC)  [x]Shiley disposable cannula cuffless tracheostomy tube (DCFS)  [] Shiley disposable cannula cuffless fenestrated tracheostomy tube [facility])  []Shiley extended length cuffless tracheostomy tube  []Shiley extended length cuffed tracheostomy tube  []Shiley laryngectomy tube (LGT)  []Shiley single cannula cuffed tracheostomy tube (SCT)  []Portex Bivona silicone tracheostomy tube  []Other:  Length  []standard  []99 mm  []120 mm  []Other:    Patient was suctioned prior to the procedure. Patient's tracheostomy was removed without problems. A new tracheostomy tube was inserted without complications. Patient did have oxygen. Oxygen need was not increased after procedure.     VITALS  Preprocedure:  breath sounds: clear and diminished Respirations: 16 Heart rate: 90     Post procedure:  breath sounds: clear and diminished Respirations:16 Heart rate: 90  Any distress after the procedure:  [x]None []Retractions []Pursed lip breathing  []Cyanosis []Tripodding []Other    New Tracheostomy Tube Placed  Size 6                  Lot # or tracheostomy tube inserted [id number]  Type  []Valeryley adult flexible tracheostomy tube with taperguard cuff   []Shiley disposable cannula cuffed tracheostomy tube (DCT)   []Shiley diposable cannula fenestrated cuffed tracheostomy tube (DFEN)  []Shiley disposable cannula cuffed percutaneous tracheostomy tube (PERC)  [x]Shiley disposable cannula cuffless tracheostomy tube (DCFS)  []Shiley disposable cannula cuffless fenestrated tracheostomy tube [facility])  []Shiley extended length cuffless tracheostomy tube  []Shiley extended length cuffed tracheostomy tube  []Shiley laryngectomy tube (LGT)  []Anup single cannula cuffed tracheostomy tube (SCT)  []Portex Bivona silicone tracheostomy tube  []Other:  Length  []standard  []99 mm  []120 mm  []Other:

## 2023-02-16 NOTE — PROGRESS NOTES
[address] [facility] PHYSICAL THERAPY  DAILY NOTE  [address] [facility], [facility],4Th Floor - 7E[address]55/055-A    Time In: 0800  Time Out: 0830  Timed Code Treatment Minutes: 30 Minutes  Minutes: 30          Date: 2023  Patient Name: Francisco Javier Lyle,  Gender:  female        MRN: [id number]  : 1950  (81 y.o.)     Referring Practitioner: Edward Calderón MD  Diagnosis: closed intertrochanteric fx, Rt  Additional Pertinent Hx: Dell Sinha is a 73yoF with PMH of recent PE and b/l DVT, macrocytic anemia, CAD, DM2, tracheostomy dependent, obesity, and HTN who presents for leg swelling, nausea, and constipation. She was recently discharged after an 8 day admission for DVTs and PEs. She was placed on Xarelto when discharged and stated that she has been compliant and only missed one dose. She returned due to chronic constipation, feeling ''bloated'', nausea, and LE edema. While being evaluated in the ED the pt had a mechanical fall and developed a R femur fracture. She was found to have supratheraputic INR at 3.83. CT head and C-spine both clear. Pt is s/p R femur ORIF by Dr Merary Colmenares . Pt found to have acute fractures of L2-S1 likely 2/2 osteoporosis, ok for activity as tolerated per Dr Vinny Greene . Prior Level of Function:  Lives With: Spouse  Type of Home: House  Home Layout: One level  Home Access: Ramped entrance (or 1 step with no rails.)  Home Equipment: Boy Zoila, rolling, Lift chair (Pt sleeps in lift chair and was using it to assist up to stand PTA)   Bathroom Shower/Tub: Walk-in shower  Bathroom Toilet: Standard  Bathroom Equipment: Built-in shower seat, 3-in-1 commode    ADL Assistance: Needs assistance  Homemaking Assistance: Needs assistance  Ambulation Assistance: Independent  Transfer Assistance: Independent  Active : No  Additional Comments: pt amb short distances in the home with RW,  available / and able to assist as needed.  Has a passi valve that she uses at rest.    Restrictions/Precautions:  Restrictions/Precautions: General Precautions, Fall Risk, Weight Bearing  Right Lower Extremity Weight Bearing: Weight Bearing As Tolerated  Position Activity Restriction  Other position/activity restrictions: Tracheostomy/collar 6L. With venturi mask for out of room use 8 liters portable O2. SUBJECTIVE: Pt resting in bed. Stated she slept well last night. Pleasant and cooperative. At end of session, pt stated she needed her cannula or trach changed. Nsg notified and came in to attend to the pt    PAIN: 0/10: at rest    Vitals: Vitals not assessed per clinical judgement, see nursing flowsheet    OBJECTIVE:  Bed Mobility:  Supine to Sit: Moderate Assistance to fully clear RLE and at trunk to elevate. Bed flat, 1 rail used  Scooting: Moderate Assistance, using 1/2 bridges and rail assist to scoot laterally approx 2 \"    Transfers:  Sit to Stand: Moderate Assistance, X 1, with Tommy Gamble. Min assist +1 from device pads  Stand to Sit:Minimal Assistance  To/From Bed and Chair: Dependent, X 1, with Tommy Gamble      Balance:  Static Standing Balance: Minimal Assistance, with verbal cues , and demo given to stand within the device with hands only on the front bar as pt tends to lean onto her forearms. Pt stood 40 sec with hands only and upright posture and then 10 sec with forearm support prior to need to sit. Mod cues needed for encouragement to attempt to stand longer as pt was wanting to sit after 15 sec. Exercise:     Exercises were completed for increased independence with functional mobility. Supine- passive RT heel cord stretch followed by ankle pumps margarita with cues to emphasize df, assisted Rt hip ABD and manually resisted Lt hip ABD, assisted Rt heel slides, no assist needed with Lt heel slides, glute and quad sets with manual cues to increase m.  Activation RLE x10 reps ea    Functional Outcome Measures: Not completed       ASSESSMENT:  Assessment: Patient progressing toward established goals. Activity Tolerance:  Patient tolerance of  treatment: good. Mod cues needed for encouragement throughout the session     Equipment Recommendations:Equipment Needed: No  Other: Pt has RW, manual w/c and mechanical lift device  Discharge Recommendations: Continue to assess pending progress and Patient would benefit from continued PT at discharge  Plan: Current Treatment Recommendations: Strengthening, Balance training, Functional mobility training, Transfer training, Endurance training, Equipment evaluation, education, & procurement, Patient/Caregiver education & training, Safety education & training, Therapeutic activities, Home exercise program, Wheelchair mobility training, Gait training, Pain management  General Plan:  (5x/ wk 90 min)    Patient Education  Patient Education: Home Exercise Program, Bed Mobility, Transfers,  - Patient Verbalized Understanding, - Patient Requires Continued Education    Goals:  Patient Goals : get around o my own without the RW  Short Term Goals  Time Frame for Short Term Goals: 1 wk  Short Term Goal 1: Pt will go from supine <->sit, mod +1 to get in/out of bed. Short Term Goal 2: Pt will get up/down from bed, into JEANNETTE stedy device, +1 mod assist to progress to up to RW  Short Term Goal 3: Pt will  the JEANNETTE stedy device, +1 min assist x5 min, to progress to standing with RW  Short Term Goal 4: Pt will propel w/c >= 50 ft, tile surface, CGA for home mobility  Long Term Goals  Time Frame for Long Term Goals : 3 wks from evaluation. Long Term Goal 1: Pt to go supine <->sit, min +1 to get in/out of bed. Long Term Goal 2: Pt to get up/down from bed or w/c +1 min assist to get up to walk. Long Term Goal 3: Pt to perform stand/pivot transfer with RW, +1 min assist to get in/out of w/c.   Long Term Goal 4: Pt to walk with RW >= 10 ft, mn +1 to progress to home mobility  Long Term Goal 5: Pt to propel w/c >= 50 ft, Mod I, for home mobility  Additional Goals?: Yes  Long term goal 6: Pt to get in/out of car, min +1 for transportation needs. Following session, patient left in safe position with all fall risk precautions in place.

## 2023-02-16 NOTE — PLAN OF CARE
Problem: Chronic Conditions and Co-morbidities  Goal: Patient's chronic conditions and co-morbidity symptoms are monitored and maintained or improved  Outcome: Progressing  Flowsheets (Taken 2/15/2023 2100)  Care Plan - Patient's Chronic Conditions and Co-Morbidity Symptoms are Monitored and Maintained or Improved: Monitor and assess patient's chronic conditions and comorbid symptoms for stability, deterioration, or improvement     Problem: Discharge Planning  Goal: Discharge to home or other facility with appropriate resources  Outcome: Progressing  Flowsheets (Taken 2/15/2023 2100)  Discharge to home or other facility with appropriate resources: Identify barriers to discharge with patient and caregiver     Problem: Skin/Tissue Integrity  Goal: Absence of new skin breakdown  Description: 1. Monitor for areas of redness and/or skin breakdown  2. Assess vascular access sites hourly  3. Every 4-6 hours minimum:  Change oxygen saturation probe site  4. Every 4-6 hours:  If on nasal continuous positive airway pressure, respiratory therapy assess nares and determine need for appliance change or resting period.   Outcome: Progressing     Problem: ABCDS Injury Assessment  Goal: Absence of physical injury  Outcome: Progressing     Problem: Safety - Adult  Goal: Free from fall injury  Outcome: Progressing     Problem: Pain  Goal: Verbalizes/displays adequate comfort level or baseline comfort level  Outcome: Progressing

## 2023-02-16 NOTE — PLAN OF CARE
Problem: Chronic Conditions and Co-morbidities  Goal: Patient's chronic conditions and co-morbidity symptoms are monitored and maintained or improved  2/16/2023 1631 by Dhiraj Jovel RN  Outcome: Progressing  Flowsheets (Taken 2/16/2023 0815)  Care Plan - Patient's Chronic Conditions and Co-Morbidity Symptoms are Monitored and Maintained or Improved:   Monitor and assess patient's chronic conditions and comorbid symptoms for stability, deterioration, or improvement   Collaborate with multidisciplinary team to address chronic and comorbid conditions and prevent exacerbation or deterioration   Update acute care plan with appropriate goals if chronic or comorbid symptoms are exacerbated and prevent overall improvement and discharge     Problem: Discharge Planning  Goal: Discharge to home or other facility with appropriate resources  2/16/2023 1631 by Dhiraj Jovel RN  Outcome: Progressing  Flowsheets (Taken 2/16/2023 0815)  Discharge to home or other facility with appropriate resources:   Identify barriers to discharge with patient and caregiver   Arrange for needed discharge resources and transportation as appropriate   Identify discharge learning needs (meds, wound care, etc)     Problem: Skin/Tissue Integrity  Goal: Absence of new skin breakdown  Description: 1. Monitor for areas of redness and/or skin breakdown  2. Assess vascular access sites hourly  3. Every 4-6 hours minimum:  Change oxygen saturation probe site  4. Every 4-6 hours:  If on nasal continuous positive airway pressure, respiratory therapy assess nares and determine need for appliance change or resting period. 2/16/2023 1631 by Dhiraj Jovel RN  Outcome: Progressing     Problem: Safety - Adult  Goal: Free from fall injury  2/16/2023 1631 by Dhiraj Jovel RN  Outcome: Progressing  Falling star prevention in place. Bed and chair alarms in use. Call light in reach. Purposeful hourly rounding.     Problem: Pain  Goal: Verbalizes/displays adequate comfort level or baseline comfort level  2/16/2023 1631 by Dimitri Lam RN  Outcome: Progressing  Flowsheets (Taken 2/16/2023 0815)  Verbalizes/displays adequate comfort level or baseline comfort level:   Encourage patient to monitor pain and request assistance   Assess pain using appropriate pain scale   Implement non-pharmacological measures as appropriate and evaluate response   Administer analgesics based on type and severity of pain and evaluate response     Problem: Nutrition Deficit:  Goal: Optimize nutritional status  Outcome: Progressing  Tolerating current diet and po fluids well.

## 2023-02-16 NOTE — PROGRESS NOTES
[address]  Occupational Therapy  Daily Note  Time:   Time In: 1130  Time Out: 1230  Timed Code Treatment Minutes: 60 Minutes  Minutes: 60          Date: 2023  Patient Name: Jan Blas,   Gender: female      Room: [facility]/055-A  MRN: [id number]  : 1950  (81 y.o.)  Referring Practitioner: Carlos Reveles MD  Diagnosis: closed intertrochanteric fracture, right  Additional Pertinent Hx: The patient was recently hospitalized from 2023 to 2023 initially for abdominal bloating, nausea and leg swelling. She was found to have elevated D-dimer. Subsequent testing revealed right lower lobe pulmonary embolism and right lower extremity DVT. She was at initially treated with IV heparin and later transition to [address] [place] [address]. The patient was brought to ER on 2022 because of progressively increased leg swelling, and nausea/vomiting associated with decreased oral intake. She was found to have BNP of 256.7 and Bumex was prescribed. She then had a fall accident  when she was going to toilet while she was in ER on 2022. The fall resulted severe right hip pain. X-ray of right hip and pelvis revealed acute right femoral intertrochanteric fracture with subtrochanteric extension. Orthopedic service was consulted. Xarelto was held in preparation for surgical management. Cardiology was consulted on 2023 for elevated BNP and Lasix was started. Because of difficulty voiding with urinary retention, urology was also consulted on 2023. Urology service placed Wen with some difficulty on 2023. Therefore Wen was kept in place. On 2/3/2023 the patient underwent open treatment of right intertrochanteric fracture with cephalomedullary nail by Dr. Mark Agee. She is allowed weightbearing as tolerated at the right lower extremity postoperatively.     Restrictions/Precautions:  Restrictions/Precautions: General Precautions, Fall Risk, Weight Bearing  Right Lower Extremity Weight Bearing: Weight Bearing As Tolerated  Position Activity Restriction  Other position/activity restrictions: Tracheostomy/collar 6L. With venturi mask for out of room use 8 liters portable O2. SUBJECTIVE: Patient seated in w/c, returning from RT, agreeable to therapy with moderate encouragement and education regarding importance of therapy. Patient fixates on trach stating respiratory therapy is supposed to change and she is worried if she is not in her room they will not find her. PAIN: 10/10: student nurse present for medication during session    Vitals: Vitals not assessed per clinical judgement, see nursing flowsheet  Nurse checked vitals prior to session    COGNITION: Decreased Problem Solving and Decreased Safety Awareness    ADL:   Grooming: Supervision. Seated in w/c however minimal assistance in order to open toothbrush wrapper. Completed at sink . BALANCE:  Sitting Balance:  Modified Independent. Standing Balance: Contact Guard Assistance, X 1, with cues for safety, with increased time for completion, within jose stedy x 2 trials x 1minute 40 seconds . Verbal cues for upright posture as patient is supported on forearms with forward flex posture     BED MOBILITY:  Not Tested    TRANSFERS:  Sit to Stand:  Maximum Assistance, X 1, with [place] Angles, with increased time for completion, cues for hand placement, with verbal cues, x 3 attempts with trial 1 with maximal verbal/visual cues for technique. Stand to Sit: [employer], X 1, with [place] Angles, with increased time for completion, cues for hand placement, with verbal cues. FUNCTIONAL MOBILITY:  Assistive Device: Wheelchair  Assist Level:  Dependent. Distance:  To and from therapy gym    ASSESSMENT:     Activity Tolerance:  Patient tolerance of  treatment: Poor treatment tolerance patient is self limiting requiring increased encouragement for participation      Discharge Recommendations: Continue to assess pending progress  Equipment Recommendations: Other: Monitor pending progress  Plan: Times Per Week: 5x per week for 90 minutes  Times Per Day: Once a day  Current Treatment Recommendations: Balance training, Functional mobility training, Endurance training, Self-Care / ADL, Safety education & training, Patient/Caregiver education & training, Strengthening    Patient Education  Patient Education: Role of OT, Plan of Care, ADL's, IADL's, Energy Conservation, Home Safety, and Importance of Increasing Activity    Goals  Short Term Goals  Time Frame for Short Term Goals: until discharge  Short Term Goal 1: Pt will tolerate 12-15 min EOB ADL task with S to improve trunk control and activity tolerance required for EOB ADLs. Short Term Goal 2: Pt will complete sit-stand t/fs with mod A x 1 with minimal cues of technique to progress towards BSC t/fs  Short Term Goal 3: Pt will tolerate standing 1 min with min A for increased ease of clothing management nad LB bathing routine  Short Term Goal 4: Pt will tolerate further assessment of functional t/fs by OTR when appropriate  Long Term Goals  Time Frame for Long Term Goals : 2 weeks from IPR OT eval  Long Term Goal 1: Pt will complete sit-stand t/fs with SBA x 1 with minimal cues of technique to progress towards BSC t/fs  Long Term Goal 2: Pt will perform UB/LB dressing and bathing with Min A and minimal cues for ECT to improve functional independence. Following session, patient left in safe position with all fall risk precautions in place.

## 2023-02-16 NOTE — PLAN OF CARE
Problem: OXYGENATION/RESPIRATORY FUNCTION  Goal: Patient will maintain patent airway  3/29/2022 0633 by Moses Gomez RCP  Outcome: Ongoing  3/29/2022 0518 by Hammad Chatman RN  Outcome: Met This Shift  Goal: Patient will achieve/maintain normal respiratory rate/effort  Description: Respiratory rate and effort will be within normal limits for the patient  Outcome: Ongoing done

## 2023-02-16 NOTE — PROGRESS NOTES
This RCP and Anahi HI RCP called to assess pt trach. Pt states she doesn't feel as if she is getting enough moisture through LVN. Changed LVN at this time. Pt placed inner cannula in herself states she feels as if its getting stuck. With speak with RN about getting order to change trach for pt.

## 2023-02-17 LAB — GLUCOSE BLD STRIP.AUTO-MCNC: 98 MG/DL (ref 70–108)

## 2023-02-17 PROCEDURE — 1180000000 HC REHAB R&B

## 2023-02-17 PROCEDURE — 2580000003 HC RX 258: Performed by: PHYSICAL MEDICINE & REHABILITATION

## 2023-02-17 PROCEDURE — 82948 REAGENT STRIP/BLOOD GLUCOSE: CPT

## 2023-02-17 PROCEDURE — 6370000000 HC RX 637 (ALT 250 FOR IP): Performed by: FAMILY MEDICINE

## 2023-02-17 PROCEDURE — 97530 THERAPEUTIC ACTIVITIES: CPT

## 2023-02-17 PROCEDURE — 97130 THER IVNTJ EA ADDL 15 MIN: CPT

## 2023-02-17 PROCEDURE — 97129 THER IVNTJ 1ST 15 MIN: CPT

## 2023-02-17 PROCEDURE — 97535 SELF CARE MNGMENT TRAINING: CPT

## 2023-02-17 PROCEDURE — 99232 SBSQ HOSP IP/OBS MODERATE 35: CPT | Performed by: PHYSICAL MEDICINE & REHABILITATION

## 2023-02-17 PROCEDURE — 97110 THERAPEUTIC EXERCISES: CPT

## 2023-02-17 PROCEDURE — 94760 N-INVAS EAR/PLS OXIMETRY 1: CPT

## 2023-02-17 PROCEDURE — 6370000000 HC RX 637 (ALT 250 FOR IP): Performed by: PHYSICAL MEDICINE & REHABILITATION

## 2023-02-17 PROCEDURE — 2700000000 HC OXYGEN THERAPY PER DAY

## 2023-02-17 PROCEDURE — 6370000000 HC RX 637 (ALT 250 FOR IP): Performed by: NURSE PRACTITIONER

## 2023-02-17 RX ORDER — INSULIN GLARGINE 100 [IU]/ML
26 INJECTION, SOLUTION SUBCUTANEOUS NIGHTLY
Status: DISCONTINUED | OUTPATIENT
Start: 2023-02-17 | End: 2023-02-22

## 2023-02-17 RX ADMIN — ROSUVASTATIN 10 MG: 10 TABLET, FILM COATED ORAL at 21:46

## 2023-02-17 RX ADMIN — SODIUM CHLORIDE, PRESERVATIVE FREE 10 ML: 5 INJECTION INTRAVENOUS at 12:13

## 2023-02-17 RX ADMIN — TRAMADOL HYDROCHLORIDE 100 MG: 50 TABLET, COATED ORAL at 17:53

## 2023-02-17 RX ADMIN — FUROSEMIDE 20 MG: 20 TABLET ORAL at 11:07

## 2023-02-17 RX ADMIN — FOLIC ACID 1 MG: 1 TABLET ORAL at 11:07

## 2023-02-17 RX ADMIN — DICLOFENAC SODIUM 2 G: 10 GEL TOPICAL at 21:47

## 2023-02-17 RX ADMIN — RIVAROXABAN 20 MG: 20 TABLET, FILM COATED ORAL at 17:53

## 2023-02-17 RX ADMIN — Medication 6 MG: at 21:42

## 2023-02-17 RX ADMIN — INSULIN GLARGINE 26 UNITS: 100 INJECTION, SOLUTION SUBCUTANEOUS at 22:04

## 2023-02-17 RX ADMIN — DICLOFENAC SODIUM 2 G: 10 GEL TOPICAL at 17:54

## 2023-02-17 RX ADMIN — Medication 1 TABLET: at 11:06

## 2023-02-17 RX ADMIN — QUETIAPINE FUMARATE 25 MG: 25 TABLET ORAL at 21:52

## 2023-02-17 RX ADMIN — DICLOFENAC SODIUM 2 G: 10 GEL TOPICAL at 11:09

## 2023-02-17 RX ADMIN — ACETAMINOPHEN 650 MG: 325 TABLET ORAL at 11:07

## 2023-02-17 RX ADMIN — ACETAMINOPHEN 650 MG: 325 TABLET ORAL at 15:31

## 2023-02-17 RX ADMIN — TRAMADOL HYDROCHLORIDE 100 MG: 50 TABLET, COATED ORAL at 05:47

## 2023-02-17 RX ADMIN — METOPROLOL SUCCINATE 25 MG: 25 TABLET, FILM COATED, EXTENDED RELEASE ORAL at 11:07

## 2023-02-17 RX ADMIN — TRAZODONE HYDROCHLORIDE 100 MG: 100 TABLET ORAL at 21:45

## 2023-02-17 RX ADMIN — PANTOPRAZOLE SODIUM 40 MG: 40 TABLET, DELAYED RELEASE ORAL at 05:39

## 2023-02-17 RX ADMIN — SODIUM CHLORIDE, PRESERVATIVE FREE 10 ML: 5 INJECTION INTRAVENOUS at 21:52

## 2023-02-17 RX ADMIN — TRAMADOL HYDROCHLORIDE 100 MG: 50 TABLET, COATED ORAL at 11:08

## 2023-02-17 RX ADMIN — NALOXEGOL OXALATE 12.5 MG: 12.5 TABLET, FILM COATED ORAL at 05:39

## 2023-02-17 RX ADMIN — MICONAZOLE NITRATE: 20 POWDER TOPICAL at 11:22

## 2023-02-17 RX ADMIN — FLUCONAZOLE 200 MG: 200 TABLET ORAL at 11:08

## 2023-02-17 RX ADMIN — MICONAZOLE NITRATE: 20 POWDER TOPICAL at 21:47

## 2023-02-17 RX ADMIN — ASPIRIN 81 MG 81 MG: 81 TABLET ORAL at 11:08

## 2023-02-17 RX ADMIN — ACETAMINOPHEN 650 MG: 325 TABLET ORAL at 03:17

## 2023-02-17 RX ADMIN — ACETAMINOPHEN 650 MG: 325 TABLET ORAL at 21:45

## 2023-02-17 ASSESSMENT — PAIN DESCRIPTION - DESCRIPTORS
DESCRIPTORS: ACHING
DESCRIPTORS: ACHING;THROBBING
DESCRIPTORS: ACHING
DESCRIPTORS: ACHING
DESCRIPTORS: SHARP

## 2023-02-17 ASSESSMENT — PAIN DESCRIPTION - ORIENTATION
ORIENTATION: RIGHT

## 2023-02-17 ASSESSMENT — PAIN DESCRIPTION - DIRECTION: RADIATING_TOWARDS: RT HIP AND LEG

## 2023-02-17 ASSESSMENT — PAIN - FUNCTIONAL ASSESSMENT
PAIN_FUNCTIONAL_ASSESSMENT: ACTIVITIES ARE NOT PREVENTED

## 2023-02-17 ASSESSMENT — PAIN SCALES - GENERAL
PAINLEVEL_OUTOF10: 10
PAINLEVEL_OUTOF10: 0
PAINLEVEL_OUTOF10: 8
PAINLEVEL_OUTOF10: 5
PAINLEVEL_OUTOF10: 9

## 2023-02-17 ASSESSMENT — PAIN DESCRIPTION - FREQUENCY: FREQUENCY: CONTINUOUS

## 2023-02-17 ASSESSMENT — PAIN DESCRIPTION - LOCATION
LOCATION: HIP
LOCATION: HIP;BACK;LEG

## 2023-02-17 ASSESSMENT — PAIN DESCRIPTION - ONSET: ONSET: ON-GOING

## 2023-02-17 ASSESSMENT — PAIN DESCRIPTION - PAIN TYPE: TYPE: SURGICAL PAIN;ACUTE PAIN

## 2023-02-17 NOTE — PROGRESS NOTES
[address] [facility] PHYSICAL THERAPY  DAILY NOTE  [address] [facility],4Th Floor - 7[address]55/055-A    Time In: 6561  Time Out: 7482  Timed Code Treatment Minutes: 30 Minutes  Minutes: 30          Date: 2023  Patient Name: Francisco Javier Lyle,  Gender:  female        MRN: [id number]  : 1950  (81 y.o.)     Referring Practitioner: Edward Calderón MD  Diagnosis: closed intertrochanteric fx, Rt  Additional Pertinent Hx: Dell Sinha is a 73yoF with PMH of recent PE and b/l DVT, macrocytic anemia, CAD, DM2, tracheostomy dependent, obesity, and HTN who presents for leg swelling, nausea, and constipation. She was recently discharged after an 8 day admission for DVTs and PEs. She was placed on Xarelto when discharged and stated that she has been compliant and only missed one dose. She returned due to chronic constipation, feeling ''bloated'', nausea, and LE edema. While being evaluated in the ED the pt had a mechanical fall and developed a R femur fracture. She was found to have supratheraputic INR at 3.83. CT head and C-spine both clear. Pt is s/p R femur ORIF by Dr Merary Colmenares . Pt found to have acute fractures of L2-S1 likely 2/2 osteoporosis, ok for activity as tolerated per Dr Vinny Greene . Prior Level of Function:  Lives With: Spouse  Type of Home: House  Home Layout: One level  Home Access: Ramped entrance (or 1 step with no rails.)  Home Equipment: Boy Zoila, rolling, Lift chair (Pt sleeps in lift chair and was using it to assist up to stand PTA)   Bathroom Shower/Tub: Walk-in shower  Bathroom Toilet: Standard  Bathroom Equipment: Built-in shower seat, 3-in-1 commode    ADL Assistance: Needs assistance  Homemaking Assistance: Needs assistance  Ambulation Assistance: Independent  Transfer Assistance: Independent  Active : No  Additional Comments: pt amb short distances in the home with RW,  available / and able to assist as needed.  Has a passi valve that she uses at rest.    Restrictions/Precautions:  Restrictions/Precautions: General Precautions, Fall Risk, Weight Bearing  Right Lower Extremity Weight Bearing: Weight Bearing As Tolerated  Position Activity Restriction  Other position/activity restrictions: Tracheostomy/collar 6L. With venturi mask for out of room use 8 liters portable O2. SUBJECTIVE: RN approved session. Pt in recliner upon arrival and agrees to therapy. Pt very talkative and off task easily. PAIN: 10/10 R hip, pt asked for pain meds at end of session    Vitals: Vitals not assessed per clinical judgement, see nursing flowsheet    OBJECTIVE:    Exercise:  Patient was guided in 1 set(s) 10 reps of exercise to both lower extremities. Ankle pumps, Glut sets, Quad sets, Heelslides, Hip abduction/adduction, Straight leg raises, Pelvic tilts, and cues for technique and full range . Exercises were completed for increased independence with functional mobility. Functional Outcome Measures: Completed       ASSESSMENT:  Assessment: Patient progressing toward established goals. Activity Tolerance:  Patient tolerance of  treatment: good. Pt demos decreased strength, endurance, and independence with mobility. Pt unsteady on feet and requires hands on assist for safety with mobility. Pt will benefit from cont PT at this time to ensure safety, to decrease the risk for falls and to return to PLOF.       Equipment Recommendations:Equipment Needed: No  Other: Pt has RW, manual w/c and mechanical lift device  Discharge Recommendations: Patient would benefit from continued PT at discharge  Plan: Current Treatment Recommendations: Strengthening, Balance training, Functional mobility training, Transfer training, Endurance training, Equipment evaluation, education, & procurement, Patient/Caregiver education & training, Safety education & training, Therapeutic activities, Home exercise program, Wheelchair mobility training, Gait training, Pain management  General Plan: (5x/ wk 90 min)    Patient Education  Patient Education: Plan of Care, Verbal Exercise Instruction, importance of independent completion with mobility    Goals:  Patient Goals : get around o my own without the RW  Short Term Goals  Time Frame for Short Term Goals: 1 wk  Short Term Goal 1: Pt will go from supine <->sit, mod +1 to get in/out of bed. Short Term Goal 2: Pt will get up/down from bed, into JEANNETTE stedy device, +1 mod assist to progress to up to RW  Short Term Goal 3: Pt will  the JEANNETTE stedy device, +1 min assist x5 min, to progress to standing with RW  Short Term Goal 4: Pt will propel w/c >= 50 ft, tile surface, CGA for home mobility  Long Term Goals  Time Frame for Long Term Goals : 3 wks from evaluation. Long Term Goal 1: Pt to go supine <->sit, min +1 to get in/out of bed. Long Term Goal 2: Pt to get up/down from bed or w/c +1 min assist to get up to walk. Long Term Goal 3: Pt to perform stand/pivot transfer with RW, +1 min assist to get in/out of w/c. Long Term Goal 4: Pt to walk with RW >= 10 ft, mn +1 to progress to home mobility  Long Term Goal 5: Pt to propel w/c >= 50 ft, Mod I, for home mobility  Additional Goals?: Yes  Long term goal 6: Pt to get in/out of car, min +1 for transportation needs. Following session, patient left in safe position with all fall risk precautions in place.

## 2023-02-17 NOTE — PROGRESS NOTES
In chair alert to person, place, time. ERLIN 4mm to 3mm brisk. Mucous membranes pink, moist. Able to communicate through lip reading. Permanent trach. Respirations unlabored on 8 liters per min, humidified, through trach collar. Heart sounds strong, regular. Lung sounds clear anterior, posterior, lateral. Active bowel sounds all four quadrants. Denies pain on palpation. Abdominal pain subsided after large formed bowel movement this morning. Arm drift negative. Skin cool, dry. Skin turgor brisk. Capillary refill less than 3 seconds. PICC right brachial arm. Bruising left arm, will monitor. Werner catheter draining blood tinged dark urine, RN Ember aware. Denies pain at werner. Skin breakdown on left buttock, bleeding after wiping. Pressure applied, barrier  cream applied. Leg lift strong, pedal push pull strong bilaterally. Pitting edema +2 in left foot, +3 in right foot extending to knee. Left leg edema +2. Negative Homans sign bilaterally. Eating breakfast, over bed table, call light in reach. Denies needs.

## 2023-02-17 NOTE — PROGRESS NOTES
[facility]  INPATIENT SPEECH THERAPY  [facility]  DAILY NOTE    TIME   SLP Individual Minutes  Time In: 1030  Time Out: 1100  Minutes: 30  Timed Code Treatment Minutes: 30 Minutes       Date: 2023  Patient Name: Kaleigh Sierra      CSN: [id number]   : 1950  (72 y.o.)  Gender: female   Referring Physician:  Ramesh Smith MD  Diagnosis: Closed intertrochanteric fracture, right,  initial encounter  Precautions: Fall risk  Current Diet: Regular and Thin Liquids   Swallowing Strategies: Standard Universal Swallow Precautions  Date of Last MBS/FEES: Not Applicable    Pain:  10/10 - Pain location: R Hip - RN aware.      Subjective:  Patient sitting in recliner, awake upon ST arrival. Patient endorsing fatigue; however, improved overall alertness levels and engagement within session. Pleasant and cooperative throughout.     Short-Term Goals:  SHORT TERM GOAL #1:  REVISED: Goal 1: Patient will complete functional problem solving and reasoning tasks with 80% accuracy and min cuing in order to improve completion of ADLs/IADLs at discharge.  INTERVENTIONS: Did not address this session d/t focus on other goals.     SHORT TERM GOAL #2:  REVISED: Goal 2: Patient will complete functional immediate, working, and delayed recall tasks of 4+ units of information, with or without use of trained recall strategies, with 80% accuracy given min cues to improve retention of pertinent medical information.  INTERVENTIONS:   Immediate/Delayed Recall - Grocery List (Milk, Eggs, Apples, Steak, Toilet Paper, Tylenol)  ST completed review of STM strategies using the acronym WRAP--Write it down, Repeat it, Associate it, and Pictures it. Patient utilized write it down and repeat it to assist with recall of 6 unit grocery list.    Immediate Recall: 5/6 independently, 1/6 given min cues   Delayed Recall (10 minutes): 4/6 independently, 2/6 given min cues to utilize list     SHORT TERM GOAL  #3: REVISED: Goal 3: Patient will complete functional thought organization and sequencing exercises with 85% accuracy and min cuing  in order to improve  completion  of  ADLs/IADLs. INTERVENTIONS:   Sequencing 4 Step ADLs  5/8 independently, 2/8 given min cues, 1/8 given mod cues   *decreased reasoning and thought organization within task  *patient often stating, \"that's not the order I would do it\"; however, with steps listed, correct sequence evident with appropriate reasoning skills. SHORT TERM GOAL #4:  Goal 4: Patient will complete sustained, selective, alternating, and divided attention tasks with no more than three  errors/redirections in five minutes/one task in order to allow for safe return to driving at discharge. INTERVENTIONS:   Sustained/Divided Attention  Patient was prompted to say \"ping\" for red suit and \"pong\" for black suit. Trial 1: Patient independently identified \"ping\" or \"pong\" correctly 46/52 cards (x6 errors), and completed task in 1:46 minutes. Trial 2: Patient independently identified \"ping\" or \"pong\" correctly 48/52 cards (x4 errors), and completed task in 2:00 minutes. Long-Term Goals:  Time Frame for Long Term Goals: 3 weeks    LONG TERM GOAL #1:  Goal 1: Patient will improve cognitive  functioning  to a level of modified independent in order to allow for safe return to PLOF.           Comprehension: 5 - Patient understands basic needs (hungry/hot/pain)  Expression: 5 - Expresses basic ideas/needs only (hungry/hot/pain)  Social Interaction: 5 - Patient is appropriate with supervision/cues  Problem Solvin - Patient solves simple/routine tasks 75-90%+   Memory: 3 - Patient remembers 50%-74% of the time    EDUCATION:  Learner: Patient  Education:  Reviewed ST goals and Plan of Care and Education Related to [employer] [facility]  Evaluation of Education: Verbalizes understanding, Demonstrates with assistance, and Needs further instruction    ASSESSMENT/PLAN:  Activity Tolerance:  Patient tolerance of  treatment: good. Assessment/Plan: Patient progressing toward established goals. Continues to require skilled care of licensed speech pathologist to progress toward achievement of established goals and plan of care.     Plan for Next Session: BASIC money management, attention   Discharge Recommendations: Outpatient Speech Therapy vs [facility]) ASHWINI [address]ley., [address]

## 2023-02-17 NOTE — PLAN OF CARE
Problem: Chronic Conditions and Co-morbidities  Goal: Patient's chronic conditions and co-morbidity symptoms are monitored and maintained or improved  2/17/2023 0135 by Reanna Abarca RN  Outcome: Progressing  Flowsheets (Taken 2/16/2023 2033)  Care Plan - Patient's Chronic Conditions and Co-Morbidity Symptoms are Monitored and Maintained or Improved: Monitor and assess patient's chronic conditions and comorbid symptoms for stability, deterioration, or improvement     Problem: Discharge Planning  Goal: Discharge to home or other facility with appropriate resources  2/17/2023 0135 by Reanna Abarca RN  Outcome: Progressing  Flowsheets (Taken 2/16/2023 2033)  Discharge to home or other facility with appropriate resources: Identify barriers to discharge with patient and caregiver     Problem: Skin/Tissue Integrity  Goal: Absence of new skin breakdown  Description: 1. Monitor for areas of redness and/or skin breakdown  2. Assess vascular access sites hourly  3. Every 4-6 hours minimum:  Change oxygen saturation probe site  4. Every 4-6 hours:  If on nasal continuous positive airway pressure, respiratory therapy assess nares and determine need for appliance change or resting period.   2/17/2023 0135 by Reanna Abarca RN  Outcome: Progressing     Problem: ABCDS Injury Assessment  Goal: Absence of physical injury  2/17/2023 0135 by Reanna Abarca RN  Outcome: Progressing     Problem: Safety - Adult  Goal: Free from fall injury  2/17/2023 0135 by Reanna Abarca RN  Outcome: Progressing     Problem: Pain  Goal: Verbalizes/displays adequate comfort level or baseline comfort level  2/17/2023 0135 by Reanna Abarca RN  Outcome: Progressing

## 2023-02-17 NOTE — PROGRESS NOTES
[facility] PHYSICAL THERAPY  DAILY NOTE  [address] [facility],[address] [facility] - [id number]    Time In: 0900  Time Out: 1004  Timed Code Treatment Minutes: 64 Minutes  Minutes: 64          Date: 2023  Patient Name: Viviana Moon,  Gender:  female        MRN: [id number]  : 1950  (81 y.o.)     Referring Practitioner: Donna Juarez MD  Diagnosis: closed intertrochanteric fx, Rt  Additional Pertinent Hx: Gunnar Jenkins is a 73yoF with PMH of recent PE and b/l DVT, macrocytic anemia, CAD, DM2, tracheostomy dependent, obesity, and HTN who presents for leg swelling, nausea, and constipation. She was recently discharged after an 8 day admission for DVTs and PEs. She was placed on Xarelto when discharged and stated that she has been compliant and only missed one dose. She returned due to chronic constipation, feeling ''bloated'', nausea, and LE edema. While being evaluated in the ED the pt had a mechanical fall and developed a R femur fracture. She was found to have supratheraputic INR at 3.83. CT head and C-spine both clear. Pt is s/p R femur ORIF by Dr Jasmeet Claire . Pt found to have acute fractures of L2-S1 likely 2/2 osteoporosis, ok for activity as tolerated per Dr Prem Dove . Prior Level of Function:  Lives With: Spouse  Type of Home: House  Home Layout: One level  Home Access: Ramped entrance (or 1 step with no rails.)  Home Equipment: Vernell Teoe, rolling, Lift chair (Pt sleeps in lift chair and was using it to assist up to stand PTA)   Bathroom Shower/Tub: Walk-in shower  Bathroom Toilet: Standard  Bathroom Equipment: Built-in shower seat, 3-in-1 commode    ADL Assistance: Needs assistance  Homemaking Assistance: Needs assistance  Ambulation Assistance: Independent  Transfer Assistance: Independent  Active : No  Additional Comments: pt amb short distances in the home with RW,  available  and able to assist as needed.  Has a passi valve that she uses at rest.    Restrictions/Precautions:  Restrictions/Precautions: General Precautions, Fall Risk, Weight Bearing  Right Lower Extremity Weight Bearing: Weight Bearing As Tolerated  Position Activity Restriction  Other position/activity restrictions: Tracheostomy/collar 6L. With venturi mask for out of room use 8 liters portable O2. SUBJECTIVE: Patient in room in recliner, agreeable to PT. Pt notes large bowel movement this AM.  nursing home to therapy gym, pt noting she needed to have another bowel movement. Pt positive another large bowel movement, some incontinent in depends before making it to toilet. *Redness noted in urine, RN aware    PAIN: right hip, not rated    Vitals: Oxygen: % on 6L in room, 8L with therapy with venturi mask    OBJECTIVE:  Bed Mobility:  Not Tested    Transfers:  Sit to Stand: Minimal Assistance, Moderate Assistance, X 2 with [facility]  Stand to Sit:Minimal Assistance, X 2 with [facility]  To/From Bed and Chair: Dependent, with [facility]  *Verbal cues for foot positioning and posture  *Pt stood multiple times, requiring rest breaks from standing while completing hygiene after bowel movement. Ambulation:  Not tested    Balance:  Static Standing Balance: Contact Guard Assistance with [facility]    Wheelchair Mobility:  Stand By Assistance  Extremities Used: Bilateral Upper Extremities  Type of Chair:Manual  Surface: Level Tile  Distance: 39'  Quality: Slow Velocity, Short Strokes, and Decreased Fluidity   *Increased time to complete  *Verbal cues for increased shoulder extension for increased movement in wheelchair     Exercise:  Patient was guided in 1 set(s) 10 reps of exercise to both lower extremities. Ankle pumps. Exercises were completed for increased independence with functional mobility. Functional Outcome Measures: Not completed       ASSESSMENT:  Assessment: Patient progressing toward established goals.   Activity Tolerance:  Patient tolerance of  treatment: good.      Equipment Recommendations:Equipment Needed: No  Other: Pt has RW, manual w/c and mechanical lift device  Discharge Recommendations: Continue to assess pending progress and Patient would benefit from continued PT at discharge  Plan: Current Treatment Recommendations: Strengthening, Balance training, Functional mobility training, Transfer training, Endurance training, Equipment evaluation, education, & procurement, Patient/Caregiver education & training, Safety education & training, Therapeutic activities, Home exercise program, Wheelchair mobility training, Gait training, Pain management  General Plan:  (5x/ wk 90 min)    Patient Education  Patient Education: Transfers, Verbal Exercise Instruction,  - Patient Verbalized Understanding, - Patient Requires Continued Education    Goals:  Patient Goals : get around o my own without the RW  Short Term Goals  Time Frame for Short Term Goals: 1 wk  Short Term Goal 1: Pt will go from supine <->sit, mod +1 to get in/out of bed. Short Term Goal 2: Pt will get up/down from bed, into JEANNETTE stedy device, +1 mod assist to progress to up to RW  Short Term Goal 3: Pt will  the JEANNETTE stedy device, +1 min assist x5 min, to progress to standing with RW  Short Term Goal 4: Pt will propel w/c >= 50 ft, tile surface, CGA for home mobility  Long Term Goals  Time Frame for Long Term Goals : 3 wks from evaluation. Long Term Goal 1: Pt to go supine <->sit, min +1 to get in/out of bed. Long Term Goal 2: Pt to get up/down from bed or w/c +1 min assist to get up to walk. Long Term Goal 3: Pt to perform stand/pivot transfer with RW, +1 min assist to get in/out of w/c. Long Term Goal 4: Pt to walk with RW >= 10 ft, mn +1 to progress to home mobility  Long Term Goal 5: Pt to propel w/c >= 50 ft, Mod I, for home mobility  Additional Goals?: Yes  Long term goal 6: Pt to get in/out of car, min +1 for transportation needs.     Following session, patient left in safe position with all fall risk precautions in place.

## 2023-02-17 NOTE — PLAN OF CARE
Problem: Metabolic/Fluid and Electrolytes - Adult  Goal: Electrolytes maintained within normal limits  Outcome: Progressing  Flowsheets (Taken 2/17/2023 0145)  Electrolytes maintained within normal limits:   Monitor labs and assess patient for signs and symptoms of electrolyte imbalances   Fluid restriction as ordered     Problem: Metabolic/Fluid and Electrolytes - Adult  Goal: Hemodynamic stability and optimal renal function maintained  Outcome: Progressing  Flowsheets (Taken 2/17/2023 0145)  Hemodynamic stability and optimal renal function maintained:   Monitor labs and assess for signs and symptoms of volume excess or deficit   Monitor intake, output and patient weight   Encourage oral intake as appropriate   Instruct patient on fluid and nutrition restrictions as appropriate     Problem: Metabolic/Fluid and Electrolytes - Adult  Goal: Glucose maintained within prescribed range  Outcome: Progressing  Flowsheets  Taken 2/17/2023 0145  Glucose maintained within prescribed range:   Monitor blood glucose as ordered   Assess for signs and symptoms of hyperglycemia and hypoglycemia   Administer ordered medications to maintain glucose within target range  Taken 2/16/2023 2033  Glucose maintained within prescribed range:   Monitor blood glucose as ordered   Assess for signs and symptoms of hyperglycemia and hypoglycemia

## 2023-02-17 NOTE — PROGRESS NOTES
Resting in chair visiting with daughter. Alert and oriented to person, place, time. Mucous membranes pink, moist. Productive cough noted. Trach collar present running at 40% humidity, 8 liters per min. Respirations unlabored. Lung sounds clear anterior. Diminished posterior, lateral. Heart sound strong, regular. Bowel sounds active all four quadrants. Denies pain on palpation. PICC left brachial arm. No redness, edema noted. Bruising on right forearm and upper arm. Will monitor. Arm drift negative bilaterally. Hand grasp strong bilaterally. Skin warm, dry. Radial pulses strong equal. Leg lift, pedal push pull strong bilaterally. Pedal pulses strong bilaterally. Pitting edema +2 bilaterally in feet. Negative Homans sign bilaterally. Denies needs. Over bed table and call light in reach.

## 2023-02-17 NOTE — PLAN OF CARE
Problem: Chronic Conditions and Co-morbidities  Goal: Patient's chronic conditions and co-morbidity symptoms are monitored and maintained or improved  2/17/2023 1148 by Shira Cortés RN  Outcome: Progressing  2/17/2023 0135 by Mony Pearson RN  Outcome: Progressing  Flowsheets (Taken 2/16/2023 2033)  Care Plan - Patient's Chronic Conditions and Co-Morbidity Symptoms are Monitored and Maintained or Improved: Monitor and assess patient's chronic conditions and comorbid symptoms for stability, deterioration, or improvement     Problem: Discharge Planning  Goal: Discharge to home or other facility with appropriate resources  2/17/2023 1148 by Shira Cortés RN  Outcome: Not Progressing  Flowsheets (Taken 2/17/2023 1148)  Discharge to home or other facility with appropriate resources:   Identify barriers to discharge with patient and caregiver   Arrange for needed discharge resources and transportation as appropriate   Refer to discharge planning if patient needs post-hospital services based on physician order or complex needs related to functional status, cognitive ability or social support system   Identify discharge learning needs (meds, wound care, etc)  Note: Patients discharge has not been determined at this time. 2/17/2023 0135 by Mony Pearson RN  Outcome: Progressing  Flowsheets (Taken 2/16/2023 2033)  Discharge to home or other facility with appropriate resources: Identify barriers to discharge with patient and caregiver     Problem: Skin/Tissue Integrity  Goal: Absence of new skin breakdown  Description: 1. Monitor for areas of redness and/or skin breakdown  2. Assess vascular access sites hourly  3. Every 4-6 hours minimum:  Change oxygen saturation probe site  4. Every 4-6 hours:  If on nasal continuous positive airway pressure, respiratory therapy assess nares and determine need for appliance change or resting period.   2/17/2023 1148 by Shira Cortés RN  Outcome: Progressing  Note: Patient free of any new skin breakdown at this time  2/17/2023 0135 by Fely Kat RN  Outcome: Progressing     Problem: ABCDS Injury Assessment  Goal: Absence of physical injury  2/17/2023 1148 by Jori Wagoner RN  Outcome: Progressing  Flowsheets (Taken 2/17/2023 1148)  Absence of Physical Injury: Implement safety measures based on patient assessment  Note: Patient free of injury at this time and continues to work with PT and OT  2/17/2023 0135 by Fely Kat RN  Outcome: Progressing     Problem: Safety - Adult  Goal: Free from fall injury  2/17/2023 1148 by Jori Wagoner RN  Outcome: Progressing  2/17/2023 0135 by Fely Kat RN  Outcome: Progressing     Problem: Pain  Goal: Verbalizes/displays adequate comfort level or baseline comfort level  2/17/2023 1148 by Jori Wagoner RN  Outcome: Progressing  Flowsheets (Taken 2/17/2023 1148)  Verbalizes/displays adequate comfort level or baseline comfort level:   Encourage patient to monitor pain and request assistance   Assess pain using appropriate pain scale  2/17/2023 0135 by Fely Kat RN  Outcome: Progressing     Problem: Nutrition Deficit:  Goal: Optimize nutritional status  Outcome: Not Progressing  Flowsheets (Taken 2/17/2023 1148)  Nutrient intake appropriate for improving, restoring, or maintaining nutritional needs: Monitor oral intake, labs, and treatment plans  Note: Patients needs frequent reminders on drinking fluids     Problem: Metabolic/Fluid and Electrolytes - Adult  Goal: Electrolytes maintained within normal limits  2/17/2023 0145 by Fely Kat RN  Outcome: Progressing  Flowsheets (Taken 2/17/2023 0145)  Electrolytes maintained within normal limits:   Monitor labs and assess patient for signs and symptoms of electrolyte imbalances   Fluid restriction as ordered  Goal: Hemodynamic stability and optimal renal function maintained  2/17/2023 0145 by Fely Kat RN  Outcome: Progressing  Flowsheets (Taken 2/17/2023 0145)  Hemodynamic stability and optimal renal function maintained:   Monitor labs and assess for signs and symptoms of volume excess or deficit   Monitor intake, output and patient weight   Encourage oral intake as appropriate   Instruct patient on fluid and nutrition restrictions as appropriate  Goal: Glucose maintained within prescribed range  2/17/2023 0145 by Marcella Ortiz RN  Outcome: Progressing  Flowsheets  Taken 2/17/2023 0145  Glucose maintained within prescribed range:   Monitor blood glucose as ordered   Assess for signs and symptoms of hyperglycemia and hypoglycemia   Administer ordered medications to maintain glucose within target range  Taken 2/16/2023 2033  Glucose maintained within prescribed range:   Monitor blood glucose as ordered   Assess for signs and symptoms of hyperglycemia and hypoglycemia     Problem: Discharge Planning  Goal: Discharge to home or other facility with appropriate resources  2/17/2023 1148 by Elio Cruz RN  Outcome: Not Progressing  Flowsheets (Taken 2/17/2023 1148)  Discharge to home or other facility with appropriate resources:   Identify barriers to discharge with patient and caregiver   Arrange for needed discharge resources and transportation as appropriate   Refer to discharge planning if patient needs post-hospital services based on physician order or complex needs related to functional status, cognitive ability or social support system   Identify discharge learning needs (meds, wound care, etc)  Note: Patients discharge has not been determined at this time.    2/17/2023 0135 by Marcella Ortiz RN  Outcome: Progressing  Flowsheets (Taken 2/16/2023 2033)  Discharge to home or other facility with appropriate resources: Identify barriers to discharge with patient and caregiver     Problem: Nutrition Deficit:  Goal: Optimize nutritional status  Outcome: Not Progressing  Flowsheets (Taken 2/17/2023 1148)  Nutrient intake appropriate for improving, restoring, or maintaining nutritional needs: Monitor oral intake, labs, and treatment plans  Note: Patients needs frequent reminders on drinking fluids

## 2023-02-17 NOTE — PROGRESS NOTES
[address]  Occupational Therapy  Daily Note  Time:   Time In: 0700  Time Out: 0730  Timed Code Treatment Minutes: 30 Minutes  Minutes: 30          Date: 2023  Patient Name: Fly Bustillo,   Gender: female      Room: -55/055-A  MRN: [id number]  : 1950  (81 y.o.)  Referring Practitioner: Max Mendenhall MD  Diagnosis: closed intertrochanteric fracture, right  Additional Pertinent Hx: The patient was recently hospitalized from 2023 to 2023 initially for abdominal bloating, nausea and leg swelling. She was found to have elevated D-dimer. Subsequent testing revealed right lower lobe pulmonary embolism and right lower extremity DVT. She was at initially treated with IV heparin and later transition to [address] [place] [address]. The patient was brought to ER on 2022 because of progressively increased leg swelling, and nausea/vomiting associated with decreased oral intake. She was found to have BNP of 256.7 and Bumex was prescribed. She then had a fall accident  when she was going to toilet while she was in ER on 2022. The fall resulted severe right hip pain. X-ray of right hip and pelvis revealed acute right femoral intertrochanteric fracture with subtrochanteric extension. Orthopedic service was consulted. Xarelto was held in preparation for surgical management. Cardiology was consulted on 2023 for elevated BNP and Lasix was started. Because of difficulty voiding with urinary retention, urology was also consulted on 2023. Urology service placed Wen with some difficulty on 2023. Therefore Wen was kept in place. On 2/3/2023 the patient underwent open treatment of right intertrochanteric fracture with cephalomedullary nail by Dr. Amna Wyman. She is allowed weightbearing as tolerated at the right lower extremity postoperatively.     Restrictions/Precautions:  Restrictions/Precautions: General Precautions, Fall Risk, Weight Bearing  Right Lower Extremity Weight Bearing: Weight Bearing As Tolerated  Position Activity Restriction  Other position/activity restrictions: Tracheostomy/collar 6L.  With venturi mask for out of room use 8 liters portable O2.     SUBJECTIVE: Up in chair upon arrival, agreeable to OT session, requesting to use [facility]    PAIN: 0/10:     Vitals: Vitals not assessed per clinical judgement, see nursing flowsheet    COGNITION: Impaired Memory    ADL:   Grooming: with set-up.  Combed hair sitting on [facility]  Toilet Transfer: Minimal Assistance. To [facility] from [facility]ar[facility] .    BED MOBILITY:  Not Tested    TRANSFERS:  Sit to Stand:  Minimal Assistance. Bedside chair with use of N[facility]ar[facility] [employer] lift equipment      ASSESSMENT:     Activity Tolerance:  Patient tolerance of  treatment: Fair treatment toleranceOnce having BM patient began feeling nauseated, nurse notified      Discharge Recommendations: Continue to assess pending progress  Equipment Recommendations: Other: Monitor pending progress  Plan: Times Per Week: 5x per week for 90 minutes  Times Per Day: Once a day  Current Treatment Recommendations: Balance training, Functional mobility training, Endurance training, Self-Care / ADL, Safety education & training, Patient/Caregiver education & training, Strengthening    Patient Education  Patient Education: ADL's    Goals  Short Term Goals  Time Frame for Short Term Goals: until discharge  Short Term Goal 1: Pt will tolerate 12-15 min EOB ADL task with S to improve trunk control and activity tolerance required for EOB ADLs.  Short Term Goal 2: Pt will complete sit-stand t/fs with mod A x 1 with minimal cues of technique to progress towards BSC t/fs  Short Term Goal 3: Pt will tolerate standing 1 min with min A for increased ease of clothing management nad LB bathing routine  Short Term Goal 4: Pt will tolerate further assessment of functional t/fs by OTR when appropriate  Long Term Goals  Time Frame for Long Term Goals : 2 weeks  from [facility] OT eval  Long Term Goal 1: Pt will complete sit-stand t/fs with SBA x 1 with minimal cues of technique to progress towards BSC t/fs  Long Term Goal 2: Pt will perform UB/LB dressing and bathing with Min A and minimal cues for ECT to improve functional independence. Following session, patient left in safe position with all fall risk precautions in place.

## 2023-02-17 NOTE — PROGRESS NOTES
[address]  Occupational Therapy  Daily Note  Time:   Time In: 1330  Time Out: 1430  Timed Code Treatment Minutes: 60 Minutes  Minutes: 60          Date: 2023  Patient Name: Silvia Jose,   Gender: female      Room: [facility]55/055-A  MRN: [id number]  : 1950  (81 y.o.)  Referring Practitioner: Delfino Hollis MD  Diagnosis: closed intertrochanteric fracture, right  Additional Pertinent Hx: The patient was recently hospitalized from 2023 to 2023 initially for abdominal bloating, nausea and leg swelling. She was found to have elevated D-dimer. Subsequent testing revealed right lower lobe pulmonary embolism and right lower extremity DVT. She was at initially treated with IV heparin and later transition to [address] [place] [address]. The patient was brought to ER on 2022 because of progressively increased leg swelling, and nausea/vomiting associated with decreased oral intake. She was found to have BNP of 256.7 and Bumex was prescribed. She then had a fall accident  when she was going to toilet while she was in ER on 2022. The fall resulted severe right hip pain. X-ray of right hip and pelvis revealed acute right femoral intertrochanteric fracture with subtrochanteric extension. Orthopedic service was consulted. Xarelto was held in preparation for surgical management. Cardiology was consulted on 2023 for elevated BNP and Lasix was started. Because of difficulty voiding with urinary retention, urology was also consulted on 2023. Urology service placed Wen with some difficulty on 2023. Therefore Wen was kept in place. On 2/3/2023 the patient underwent open treatment of right intertrochanteric fracture with cephalomedullary nail by Dr. Alf Ji. She is allowed weightbearing as tolerated at the right lower extremity postoperatively.     Restrictions/Precautions:  Restrictions/Precautions: General Precautions, Fall Risk, Weight Bearing  Right Lower Extremity Weight Bearing: Weight Bearing As Tolerated  Position Activity Restriction  Other position/activity restrictions: Tracheostomy/collar 6L. With venturi mask for out of room use 8 liters portable O2. SUBJECTIVE: Patient seated in bedside chair upon arrival with moderate encouragement for participation throughout. PAIN: no numerical scale provided however expresses fatigue and pain    Vitals: Blood Pressure: 114/87  Oxygen: 97% 8L venturi mask  Heart Rate: 117bpm with activity decreased with rest break to 99bom    COGNITION: Decreased Problem Solving and Decreased Safety Awareness    ADL:   Footwear Management: Dependent. Jani B socks . BALANCE:  Sitting Balance:  Supervision. In w/c   Standing Balance: Contact Guard Assistance, X 1, with cues for safety, with verbal cues , with increased time for completion. X 2 min standing within jose stedy x 2 trials with seated rest break between each trial in order to retrieve rings at various heights to address balance, reaching out of base of support, strength, increase activity tolerance to increase independence with Adls/IADls. Patient demonstrates leaning on forearm d/t decreased strength with maximal verbal cues for upright posture. BED MOBILITY:  Not Tested    TRANSFERS:  Sit to Stand:  Maximum Assistance, X 1, with Wilhelmenia Perch, with increased time for completion, cues for hand placement, with verbal cues. X 4 trials  Stand to Sit: Contact Guard Assistance, X 1, with increased time for completion, cues for hand placement, with verbal cues. Within [facility]    FUNCTIONAL MOBILITY:  Assistive Device: Wheelchair  Assist Level:  Dependent. Distance: To and from therapy gym    ASSESSMENT:     Activity Tolerance:  Patient tolerance of  treatment: Fair treatment tolerance      Discharge Recommendations: Continue to assess pending progress  Equipment Recommendations:  Other: Monitor pending progress  Plan: Times Per Week: 5x per week for 90 minutes  Times Per Day: Once a day  Current Treatment Recommendations: Balance training, Functional mobility training, Endurance training, Self-Care / ADL, Safety education & training, Patient/Caregiver education & training, Strengthening    Patient Education  Patient Education: Role of OT, Plan of Care, ADL's, IADL's, Energy Conservation, Family Education, Home Safety, Importance of Increasing Activity, and Assistive Device Safety    Goals  Short Term Goals  Time Frame for Short Term Goals: until discharge  Short Term Goal 1: Pt will tolerate 12-15 min EOB ADL task with S to improve trunk control and activity tolerance required for EOB ADLs. Short Term Goal 2: Pt will complete sit-stand t/fs with mod A x 1 with minimal cues of technique to progress towards BSC t/fs  Short Term Goal 3: Pt will tolerate standing 1 min with min A for increased ease of clothing management nad LB bathing routine  Short Term Goal 4: Pt will tolerate further assessment of functional t/fs by OTR when appropriate  Long Term Goals  Time Frame for Long Term Goals : 2 weeks from [facility] OT eval  Long Term Goal 1: Pt will complete sit-stand t/fs with SBA x 1 with minimal cues of technique to progress towards BSC t/fs  Long Term Goal 2: Pt will perform UB/LB dressing and bathing with Min A and minimal cues for ECT to improve functional independence. Following session, patient left in safe position with all fall risk precautions in place.

## 2023-02-18 PROCEDURE — 6370000000 HC RX 637 (ALT 250 FOR IP): Performed by: PHYSICAL MEDICINE & REHABILITATION

## 2023-02-18 PROCEDURE — 6370000000 HC RX 637 (ALT 250 FOR IP): Performed by: NURSE PRACTITIONER

## 2023-02-18 PROCEDURE — 94761 N-INVAS EAR/PLS OXIMETRY MLT: CPT

## 2023-02-18 PROCEDURE — 2700000000 HC OXYGEN THERAPY PER DAY

## 2023-02-18 PROCEDURE — 6370000000 HC RX 637 (ALT 250 FOR IP): Performed by: FAMILY MEDICINE

## 2023-02-18 PROCEDURE — 2580000003 HC RX 258: Performed by: PHYSICAL MEDICINE & REHABILITATION

## 2023-02-18 PROCEDURE — 1180000000 HC REHAB R&B

## 2023-02-18 RX ADMIN — ONDANSETRON 4 MG: 4 TABLET, ORALLY DISINTEGRATING ORAL at 21:09

## 2023-02-18 RX ADMIN — ACETAMINOPHEN 650 MG: 325 TABLET ORAL at 23:18

## 2023-02-18 RX ADMIN — DOCUSATE SODIUM 100 MG: 100 CAPSULE, LIQUID FILLED ORAL at 23:17

## 2023-02-18 RX ADMIN — DICLOFENAC SODIUM 2 G: 10 GEL TOPICAL at 23:42

## 2023-02-18 RX ADMIN — FUROSEMIDE 20 MG: 20 TABLET ORAL at 09:54

## 2023-02-18 RX ADMIN — SODIUM CHLORIDE, PRESERVATIVE FREE 20 ML: 5 INJECTION INTRAVENOUS at 23:43

## 2023-02-18 RX ADMIN — FLUCONAZOLE 200 MG: 200 TABLET ORAL at 09:55

## 2023-02-18 RX ADMIN — MICONAZOLE NITRATE: 20 POWDER TOPICAL at 23:43

## 2023-02-18 RX ADMIN — ROSUVASTATIN 10 MG: 10 TABLET, FILM COATED ORAL at 23:17

## 2023-02-18 RX ADMIN — NALOXEGOL OXALATE 12.5 MG: 12.5 TABLET, FILM COATED ORAL at 06:18

## 2023-02-18 RX ADMIN — QUETIAPINE FUMARATE 25 MG: 25 TABLET ORAL at 23:17

## 2023-02-18 RX ADMIN — Medication 6 MG: at 23:17

## 2023-02-18 RX ADMIN — TRAZODONE HYDROCHLORIDE 100 MG: 100 TABLET ORAL at 23:17

## 2023-02-18 RX ADMIN — Medication 1 TABLET: at 09:53

## 2023-02-18 RX ADMIN — DOCUSATE SODIUM 100 MG: 100 CAPSULE, LIQUID FILLED ORAL at 09:54

## 2023-02-18 RX ADMIN — TRAMADOL HYDROCHLORIDE 50 MG: 50 TABLET, COATED ORAL at 18:47

## 2023-02-18 RX ADMIN — ASPIRIN 81 MG 81 MG: 81 TABLET ORAL at 09:54

## 2023-02-18 RX ADMIN — SENNOSIDES 17.2 MG: 8.6 TABLET, FILM COATED ORAL at 23:17

## 2023-02-18 RX ADMIN — FOLIC ACID 1 MG: 1 TABLET ORAL at 09:53

## 2023-02-18 RX ADMIN — PANTOPRAZOLE SODIUM 40 MG: 40 TABLET, DELAYED RELEASE ORAL at 06:18

## 2023-02-18 RX ADMIN — TRAMADOL HYDROCHLORIDE 100 MG: 50 TABLET, COATED ORAL at 06:18

## 2023-02-18 RX ADMIN — RIVAROXABAN 20 MG: 20 TABLET, FILM COATED ORAL at 23:18

## 2023-02-18 RX ADMIN — ACETAMINOPHEN 650 MG: 325 TABLET ORAL at 09:54

## 2023-02-18 RX ADMIN — INSULIN GLARGINE 26 UNITS: 100 INJECTION, SOLUTION SUBCUTANEOUS at 23:23

## 2023-02-18 ASSESSMENT — PAIN DESCRIPTION - ORIENTATION
ORIENTATION: RIGHT

## 2023-02-18 ASSESSMENT — PAIN DESCRIPTION - DESCRIPTORS
DESCRIPTORS: ACHING
DESCRIPTORS: ACHING
DESCRIPTORS: ACHING;TENDER
DESCRIPTORS: ACHING

## 2023-02-18 ASSESSMENT — PAIN SCALES - GENERAL
PAINLEVEL_OUTOF10: 4
PAINLEVEL_OUTOF10: 7
PAINLEVEL_OUTOF10: 6
PAINLEVEL_OUTOF10: 7

## 2023-02-18 ASSESSMENT — PAIN DESCRIPTION - LOCATION
LOCATION: HIP

## 2023-02-18 ASSESSMENT — PAIN - FUNCTIONAL ASSESSMENT: PAIN_FUNCTIONAL_ASSESSMENT: ACTIVITIES ARE NOT PREVENTED

## 2023-02-18 NOTE — PROGRESS NOTES
Patient: Joce Shodesirae  Unit/Bed: 0G-35/385-S  YOB: 1950  MRN: [id number] Acct: [de-identified]   Admitting Diagnosis: Closed intertrochanteric fracture, right, initial encounter [facility]) Josephine Garcia  Admit Date:  2/10/2023  Hospital Day: 7    Assessment:     Principal Problem:    Closed intertrochanteric fracture, right, initial encounter [facility])  Active Problems:    S/P tracheoplasty    Pulmonary embolism on right ([address]    Deep vein thrombosis (DVT) of both lower extremities ([address]    Hyperlipidemia    Compression of lumbar vertebra ([facility]    Anemia associated with acute blood loss    Osteoporosis of lumbar spine    Debility    Physical deconditioning    Essential hypertension    Type 2 diabetes mellitus with insulin therapy ([facility]    Morbid obesity with BMI of 40.0-44.9, adult ([address]  Resolved Problems:    * No resolved hospital problems. *      Plan:     Lower the lantus due to the lower CS  Continue off the iron as she denies nausea now        Subjective:     Patient has no complaint of CP or SOB, she states that the nausea is gone and she also has had 2 large BMs. .   Medication side effects: none    Scheduled Meds:   insulin glargine  26 Units SubCUTAneous Nightly    diclofenac sodium  2 g Topical 4x daily    fluconazole  200 mg Oral Daily    pantoprazole  40 mg Oral QAM AC    traZODone  100 mg Oral Nightly    rivaroxaban  20 mg Oral Daily    multivitamin  1 tablet Oral Daily    rosuvastatin  10 mg Oral Daily    senna  2 tablet Oral Nightly    sodium chloride flush  5-40 mL IntraVENous 2 times per day    furosemide  20 mg Oral Daily    lidocaine  2 patch TransDERmal Daily    acetaminophen  650 mg Oral Q6H    docusate sodium  100 mg Oral BID    aspirin  81 mg Oral Daily    docusate sodium  1 enema Rectal Daily    folic acid  1 mg Oral Daily    metoprolol succinate  25 mg Oral Daily    naloxegol  12.5 mg Oral QAM AC    polyethylene glycol  17 g Oral Daily    melatonin  6 mg Oral Nightly    QUEtiapine  25 mg Oral Nightly    miconazole   Topical BID     Continuous Infusions:   sodium chloride      dextrose       PRN Meds:simethicone, traMADol **OR** traMADol, diclofenac sodium, albuterol sulfate HFA, Menthol, guaiFENesin, sodium chloride flush, sodium chloride, potassium chloride **OR** potassium alternative oral replacement **OR** potassium chloride, magnesium sulfate, glucose, dextrose bolus **OR** dextrose bolus, glucagon (rDNA), dextrose, acetaminophen, bisacodyl, magnesium hydroxide, ondansetron, polyethylene glycol, lactulose    Review of Systems  Pertinent items are noted in HPI. Objective:     Patient Vitals for the past 8 hrs:   BP Temp Temp src Pulse Resp Weight   02/17/23 1753 -- -- -- -- 18 --   02/17/23 1530 -- -- -- -- -- 204 lb 0.9 oz (92.6 kg)   02/17/23 1320 114/72 98.4 °F (36.9 °C) Oral 63 18 --   02/17/23 1138 -- -- -- -- 18 --     I/O last 3 completed shifts: In: 4487 [P.O.:1420]  Out: 900 [Urine:900]  No intake/output data recorded.     /72   Pulse 63   Temp 98.4 °F (36.9 °C) (Oral)   Resp 18   Ht 5' 2\" (1.575 m)   Wt 204 lb 0.9 oz (92.6 kg)   LMP  (LMP Unknown)   SpO2 91%   BMI 37.32 kg/m²     General appearance: alert, appears stated age, and cooperative  Head: Normocephalic, without obvious abnormality, atraumatic  Lungs: clear to auscultation bilaterally  Heart: regular rate and rhythm, S1, S2 normal, no murmur, click, rub or gallop  Abdomen: soft, non-tender; bowel sounds normal; no masses,  no organomegaly  Extremities: extremities normal, atraumatic, no cyanosis or edema  Skin: Skin color, texture, turgor normal. No rashes or lesions  Neurologic:  weak    Data Review:      Latest Reference Range & Units 2/16/23 07:22 2/17/23 06:59   POC Glucose 70 - 108 mg/dl 105 98     Electronically signed by Miroslava Calvert MD on 2/17/2023 at 7:35 PM

## 2023-02-18 NOTE — PLAN OF CARE
Problem: Chronic Conditions and Co-morbidities  Goal: Patient's chronic conditions and co-morbidity symptoms are monitored and maintained or improved  2/18/2023 1418 by Pramod Dasilva RN  Outcome: Progressing  Flowsheets (Taken 2/18/2023 0931)  Care Plan - Patient's Chronic Conditions and Co-Morbidity Symptoms are Monitored and Maintained or Improved: Monitor and assess patient's chronic conditions and comorbid symptoms for stability, deterioration, or improvement  2/18/2023 0455 by Sherine Smart RN  Outcome: Progressing  Flowsheets (Taken 2/18/2023 0455)  Care Plan - Patient's Chronic Conditions and Co-Morbidity Symptoms are Monitored and Maintained or Improved: Monitor and assess patient's chronic conditions and comorbid symptoms for stability, deterioration, or improvement     Problem: Discharge Planning  Goal: Discharge to home or other facility with appropriate resources  2/18/2023 1418 by Pramod Dasilva RN  Outcome: Progressing  [address] (Taken 2/18/2023 1775)  Discharge to home or other facility with appropriate resources: Identify barriers to discharge with patient and caregiver  2/18/2023 0455 by Sherine Smart RN  Outcome: Progressing  Flowsheets (Taken 2/18/2023 0455)  Discharge to home or other facility with appropriate resources: Identify barriers to discharge with patient and caregiver     Problem: Skin/Tissue Integrity  Goal: Absence of new skin breakdown  Description: 1. Monitor for areas of redness and/or skin breakdown  2. Assess vascular access sites hourly  3. Every 4-6 hours minimum:  Change oxygen saturation probe site  4. Every 4-6 hours:  If on nasal continuous positive airway pressure, respiratory therapy assess nares and determine need for appliance change or resting period.   2/18/2023 1418 by Pramod Dasilva RN  Outcome: Progressing  2/18/2023 0455 by Sherine Smart RN  Outcome: Progressing     Problem: ABCDS Injury Assessment  Goal: Absence of physical injury  2/18/2023 1418 by Lucy Cuevas RN  Outcome: Progressing  Flowsheets  Taken 2/18/2023 1408 by Lucy Cuevas RN  Absence of Physical Injury: Implement safety measures based on patient assessment  Taken 2/18/2023 0458 by Johnette Runner, RN  Absence of Physical Injury: Implement safety measures based on patient assessment  2/18/2023 0455 by Johnette Runner, RN  Outcome: Progressing  Flowsheets (Taken 2/18/2023 0455)  Absence of Physical Injury: Implement safety measures based on patient assessment     Problem: Safety - Adult  Goal: Free from fall injury  2/18/2023 1418 by Lucy Cuevas RN  Outcome: Progressing  Flowsheets  Taken 2/18/2023 1408 by Lucy Cuevas RN  Free From Fall Injury: Instruct family/caregiver on patient safety  Taken 2/18/2023 0458 by Johnette Runner, RN  Free From Fall Injury: Instruct family/caregiver on patient safety  2/18/2023 0455 by Johnette Runner, RN  Outcome: Progressing  Flowsheets (Taken 2/18/2023 0455)  Free From Fall Injury: Instruct family/caregiver on patient safety     Problem: Pain  Goal: Verbalizes/displays adequate comfort level or baseline comfort level  2/18/2023 1418 by Lucy Cuevsa RN  Outcome: Progressing  2/18/2023 0455 by Johnette Runner, RN  Outcome: Progressing  Flowsheets (Taken 2/18/2023 0455)  Verbalizes/displays adequate comfort level or baseline comfort level:   Encourage patient to monitor pain and request assistance   Assess pain using appropriate pain scale     Problem: Nutrition Deficit:  Goal: Optimize nutritional status  2/18/2023 1418 by Lucy Cuevas RN  Outcome: Progressing  2/18/2023 0455 by Johnette Runner, RN  Outcome: Progressing  Flowsheets (Taken 2/18/2023 0455)  Nutrient intake appropriate for improving, restoring, or maintaining nutritional needs:   Assess nutritional status and recommend course of action   Monitor oral intake, labs, and treatment plans     Problem: Metabolic/Fluid and Electrolytes - Adult  Goal: Electrolytes maintained within normal limits  2/18/2023 1418 by Darrell Collar, RN  Outcome: Progressing  Flowsheets (Taken 2/18/2023 2528)  Electrolytes maintained within normal limits: Monitor labs and assess patient for signs and symptoms of electrolyte imbalances  2/18/2023 0455 by Sarita Adam RN  Outcome: Progressing  Flowsheets (Taken 2/18/2023 0455)  Electrolytes maintained within normal limits: Monitor labs and assess patient for signs and symptoms of electrolyte imbalances  Goal: Hemodynamic stability and optimal renal function maintained  Outcome: Progressing  Flowsheets (Taken 2/18/2023 0931)  Hemodynamic stability and optimal renal function maintained: Monitor labs and assess for signs and symptoms of volume excess or deficit  Goal: Glucose maintained within prescribed range  Outcome: Progressing  Flowsheets (Taken 2/18/2023 0931)  Glucose maintained within prescribed range: Monitor blood glucose as ordered

## 2023-02-18 NOTE — PLAN OF CARE
Problem: Skin/Tissue Integrity  Goal: Absence of new skin breakdown  Description: 1. Monitor for areas of redness and/or skin breakdown  2. Assess vascular access sites hourly  3. Every 4-6 hours minimum:  Change oxygen saturation probe site  4. Every 4-6 hours:  If on nasal continuous positive airway pressure, respiratory therapy assess nares and determine need for appliance change or resting period.   Outcome: Progressing     Problem: ABCDS Injury Assessment  Goal: Absence of physical injury  Outcome: Progressing  Flowsheets (Taken 2/18/2023 0455)  Absence of Physical Injury: Implement safety measures based on patient assessment     Problem: Safety - Adult  Goal: Free from fall injury  Outcome: Progressing  Flowsheets (Taken 2/18/2023 0455)  Free From Fall Injury: Instruct family/caregiver on patient safety     Problem: Pain  Goal: Verbalizes/displays adequate comfort level or baseline comfort level  Outcome: Progressing  Flowsheets (Taken 2/18/2023 0455)  Verbalizes/displays adequate comfort level or baseline comfort level:   Encourage patient to monitor pain and request assistance   Assess pain using appropriate pain scale     Problem: Nutrition Deficit:  Goal: Optimize nutritional status  Outcome: Progressing  Flowsheets (Taken 2/18/2023 0455)  Nutrient intake appropriate for improving, restoring, or maintaining nutritional needs:   Assess nutritional status and recommend course of action   Monitor oral intake, labs, and treatment plans     Problem: Metabolic/Fluid and Electrolytes - Adult  Goal: Electrolytes maintained within normal limits  Outcome: Progressing  Flowsheets (Taken 2/18/2023 0455)  Electrolytes maintained within normal limits: Monitor labs and assess patient for signs and symptoms of electrolyte imbalances

## 2023-02-19 PROCEDURE — 6370000000 HC RX 637 (ALT 250 FOR IP): Performed by: PHYSICAL MEDICINE & REHABILITATION

## 2023-02-19 PROCEDURE — 6370000000 HC RX 637 (ALT 250 FOR IP): Performed by: FAMILY MEDICINE

## 2023-02-19 PROCEDURE — 2700000000 HC OXYGEN THERAPY PER DAY

## 2023-02-19 PROCEDURE — 97530 THERAPEUTIC ACTIVITIES: CPT

## 2023-02-19 PROCEDURE — 94760 N-INVAS EAR/PLS OXIMETRY 1: CPT

## 2023-02-19 PROCEDURE — 2580000003 HC RX 258: Performed by: PHYSICAL MEDICINE & REHABILITATION

## 2023-02-19 PROCEDURE — 97110 THERAPEUTIC EXERCISES: CPT

## 2023-02-19 PROCEDURE — 6370000000 HC RX 637 (ALT 250 FOR IP): Performed by: NURSE PRACTITIONER

## 2023-02-19 PROCEDURE — 1180000000 HC REHAB R&B

## 2023-02-19 RX ADMIN — DICLOFENAC SODIUM 2 G: 10 GEL TOPICAL at 08:20

## 2023-02-19 RX ADMIN — ROSUVASTATIN 10 MG: 10 TABLET, FILM COATED ORAL at 22:03

## 2023-02-19 RX ADMIN — ACETAMINOPHEN 650 MG: 325 TABLET ORAL at 08:29

## 2023-02-19 RX ADMIN — TRAMADOL HYDROCHLORIDE 100 MG: 50 TABLET, COATED ORAL at 22:00

## 2023-02-19 RX ADMIN — MICONAZOLE NITRATE: 20 POWDER TOPICAL at 22:38

## 2023-02-19 RX ADMIN — ASPIRIN 81 MG 81 MG: 81 TABLET ORAL at 08:29

## 2023-02-19 RX ADMIN — DICLOFENAC SODIUM 2 G: 10 GEL TOPICAL at 18:24

## 2023-02-19 RX ADMIN — FLUCONAZOLE 200 MG: 200 TABLET ORAL at 08:20

## 2023-02-19 RX ADMIN — QUETIAPINE FUMARATE 25 MG: 25 TABLET ORAL at 22:04

## 2023-02-19 RX ADMIN — ACETAMINOPHEN 650 MG: 325 TABLET ORAL at 22:00

## 2023-02-19 RX ADMIN — DICLOFENAC SODIUM 2 G: 10 GEL TOPICAL at 12:16

## 2023-02-19 RX ADMIN — ONDANSETRON 4 MG: 4 TABLET, ORALLY DISINTEGRATING ORAL at 08:29

## 2023-02-19 RX ADMIN — PANTOPRAZOLE SODIUM 40 MG: 40 TABLET, DELAYED RELEASE ORAL at 08:29

## 2023-02-19 RX ADMIN — METOPROLOL SUCCINATE 25 MG: 25 TABLET, FILM COATED, EXTENDED RELEASE ORAL at 08:20

## 2023-02-19 RX ADMIN — FOLIC ACID 1 MG: 1 TABLET ORAL at 08:20

## 2023-02-19 RX ADMIN — INSULIN GLARGINE 26 UNITS: 100 INJECTION, SOLUTION SUBCUTANEOUS at 22:10

## 2023-02-19 RX ADMIN — SODIUM CHLORIDE, PRESERVATIVE FREE 10 ML: 5 INJECTION INTRAVENOUS at 22:03

## 2023-02-19 RX ADMIN — TRAZODONE HYDROCHLORIDE 100 MG: 100 TABLET ORAL at 22:01

## 2023-02-19 RX ADMIN — MICONAZOLE NITRATE: 20 POWDER TOPICAL at 08:24

## 2023-02-19 RX ADMIN — DICLOFENAC SODIUM 2 G: 10 GEL TOPICAL at 22:04

## 2023-02-19 RX ADMIN — TRAMADOL HYDROCHLORIDE 100 MG: 50 TABLET, COATED ORAL at 09:47

## 2023-02-19 RX ADMIN — NALOXEGOL OXALATE 12.5 MG: 12.5 TABLET, FILM COATED ORAL at 08:20

## 2023-02-19 RX ADMIN — RIVAROXABAN 20 MG: 20 TABLET, FILM COATED ORAL at 18:24

## 2023-02-19 RX ADMIN — Medication 6 MG: at 22:14

## 2023-02-19 RX ADMIN — Medication 1 TABLET: at 08:20

## 2023-02-19 RX ADMIN — SODIUM CHLORIDE, PRESERVATIVE FREE 10 ML: 5 INJECTION INTRAVENOUS at 08:21

## 2023-02-19 RX ADMIN — FUROSEMIDE 20 MG: 20 TABLET ORAL at 08:20

## 2023-02-19 ASSESSMENT — PAIN - FUNCTIONAL ASSESSMENT
PAIN_FUNCTIONAL_ASSESSMENT: ACTIVITIES ARE NOT PREVENTED
PAIN_FUNCTIONAL_ASSESSMENT: ACTIVITIES ARE NOT PREVENTED

## 2023-02-19 ASSESSMENT — ENCOUNTER SYMPTOMS
DIARRHEA: 0
SHORTNESS OF BREATH: 0
BACK PAIN: 1
COUGH: 0
WHEEZING: 0
ABDOMINAL PAIN: 0
VOMITING: 0
TROUBLE SWALLOWING: 0
NAUSEA: 0
RHINORRHEA: 0
SORE THROAT: 0
CONSTIPATION: 0

## 2023-02-19 ASSESSMENT — PAIN DESCRIPTION - LOCATION
LOCATION: HIP
LOCATION: HIP
LOCATION: LEG

## 2023-02-19 ASSESSMENT — PAIN DESCRIPTION - DESCRIPTORS
DESCRIPTORS: ACHING
DESCRIPTORS: ACHING;CRAMPING;DISCOMFORT
DESCRIPTORS: ACHING

## 2023-02-19 ASSESSMENT — PAIN DESCRIPTION - PAIN TYPE
TYPE: SURGICAL PAIN;ACUTE PAIN
TYPE: SURGICAL PAIN;ACUTE PAIN

## 2023-02-19 ASSESSMENT — PAIN SCALES - GENERAL
PAINLEVEL_OUTOF10: 7
PAINLEVEL_OUTOF10: 8

## 2023-02-19 ASSESSMENT — PAIN DESCRIPTION - ORIENTATION
ORIENTATION: RIGHT
ORIENTATION: RIGHT
ORIENTATION: LEFT

## 2023-02-19 ASSESSMENT — PAIN DESCRIPTION - FREQUENCY: FREQUENCY: CONTINUOUS

## 2023-02-19 ASSESSMENT — PAIN DESCRIPTION - ONSET: ONSET: ON-GOING

## 2023-02-19 ASSESSMENT — PAIN DESCRIPTION - DIRECTION: RADIATING_TOWARDS: RT HIP AND LEG

## 2023-02-19 NOTE — PLAN OF CARE
Problem: Chronic Conditions and Co-morbidities  Goal: Patient's chronic conditions and co-morbidity symptoms are monitored and maintained or improved  2/19/2023 1342 by Jeff Hernandez RN  Outcome: Progressing  2/19/2023 0029 by Pricila Barron RN  Outcome: Progressing  Flowsheets (Taken 2/18/2023 2130)  Care Plan - Patient's Chronic Conditions and Co-Morbidity Symptoms are Monitored and Maintained or Improved:   Monitor and assess patient's chronic conditions and comorbid symptoms for stability, deterioration, or improvement   Update acute care plan with appropriate goals if chronic or comorbid symptoms are exacerbated and prevent overall improvement and discharge   Collaborate with multidisciplinary team to address chronic and comorbid conditions and prevent exacerbation or deterioration     Problem: Discharge Planning  Goal: Discharge to home or other facility with appropriate resources  2/19/2023 1342 by Jeff Hernandez RN  Outcome: Progressing  Flowsheets (Taken 2/19/2023 0810)  Discharge to home or other facility with appropriate resources: Identify barriers to discharge with patient and caregiver  2/19/2023 0029 by Pricila Barron RN  Outcome: Progressing  Flowsheets (Taken 2/18/2023 2130)  Discharge to home or other facility with appropriate resources:   Identify barriers to discharge with patient and caregiver   Arrange for needed discharge resources and transportation as appropriate   Identify discharge learning needs (meds, wound care, etc)   Refer to discharge planning if patient needs post-hospital services based on physician order or complex needs related to functional status, cognitive ability or social support system     Problem: Skin/Tissue Integrity  Goal: Absence of new skin breakdown  Description: 1. Monitor for areas of redness and/or skin breakdown  2. Assess vascular access sites hourly  3. Every 4-6 hours minimum:  Change oxygen saturation probe site  4.   Every 4-6 hours:  If on nasal continuous positive airway pressure, respiratory therapy assess nares and determine need for appliance change or resting period.   2/19/2023 1342 by Jenny Knet RN  Outcome: Progressing  2/19/2023 0029 by Holli Delgado RN  Outcome: Progressing     Problem: ABCDS Injury Assessment  Goal: Absence of physical injury  2/19/2023 1342 by Jenny Kent RN  Outcome: Progressing  Flowsheets (Taken 2/19/2023 1335)  Absence of Physical Injury: Implement safety measures based on patient assessment  2/19/2023 0029 by Holli Delgado RN  Outcome: Progressing     Problem: Safety - Adult  Goal: Free from fall injury  2/19/2023 1342 by Jenny Kent RN  Outcome: Progressing  Flowsheets (Taken 2/19/2023 1335)  Free From Fall Injury: Instruct family/caregiver on patient safety  2/19/2023 0029 by Holli Delgado RN  Outcome: Progressing     Problem: Pain  Goal: Verbalizes/displays adequate comfort level or baseline comfort level  2/19/2023 1342 by Jenny Kent RN  Outcome: Progressing  2/19/2023 0029 by Holli Delgado RN  Outcome: Progressing  Flowsheets (Taken 2/18/2023 2130)  Verbalizes/displays adequate comfort level or baseline comfort level:   Encourage patient to monitor pain and request assistance   Assess pain using appropriate pain scale   Administer analgesics based on type and severity of pain and evaluate response   Implement non-pharmacological measures as appropriate and evaluate response   Consider cultural and social influences on pain and pain management   Notify Licensed Independent Practitioner if interventions unsuccessful or patient reports new pain     Problem: Nutrition Deficit:  Goal: Optimize nutritional status  2/19/2023 1342 by Jenny Kent RN  Outcome: Progressing  2/19/2023 0029 by Holli Delgado RN  Outcome: Progressing     Problem: Metabolic/Fluid and Electrolytes - Adult  Goal: Electrolytes maintained within normal limits  2/19/2023 1342 by Jenny Kent RN  Outcome: Progressing  Flowsheets (Taken 2/19/2023 0810)  Electrolytes maintained within normal limits: Monitor labs and assess patient for signs and symptoms of electrolyte imbalances  2/19/2023 0029 by Alyson Rowe RN  Outcome: Progressing  Flowsheets (Taken 2/18/2023 2130)  Electrolytes maintained within normal limits:   Monitor labs and assess patient for signs and symptoms of electrolyte imbalances   Fluid restriction as ordered  Goal: Hemodynamic stability and optimal renal function maintained  2/19/2023 1342 by Ran Álvarez RN  Outcome: Progressing  Flowsheets (Taken 2/19/2023 0810)  Hemodynamic stability and optimal renal function maintained: Monitor labs and assess for signs and symptoms of volume excess or deficit  2/19/2023 0029 by Alyson Rowe RN  Outcome: Progressing  Flowsheets (Taken 2/18/2023 2130)  Hemodynamic stability and optimal renal function maintained:   Monitor labs and assess for signs and symptoms of volume excess or deficit   Monitor intake, output and patient weight   Instruct patient on fluid and nutrition restrictions as appropriate  Goal: Glucose maintained within prescribed range  2/19/2023 1342 by Ran Álvarez RN  Outcome: Progressing  2/19/2023 0029 by Alyson Rowe RN  Outcome: Progressing  Flowsheets (Taken 2/18/2023 2130)  Glucose maintained within prescribed range:   Monitor blood glucose as ordered   Administer ordered medications to maintain glucose within target range

## 2023-02-19 NOTE — PROGRESS NOTES
[address]  Occupational Therapy  Daily Note  Time:   Time In: 1100  Time Out: 1130  Timed Code Treatment Minutes: 30 Minutes  Minutes: 30          Date: 2023  Patient Name: Mechelle Stephen,   Gender: female      Room: [facility]/055-A  MRN: [id number]  : 1950  (81 y.o.)  Referring Practitioner: Christopher Mijares MD  Diagnosis: closed intertrochanteric fracture, right  Additional Pertinent Hx: The patient was recently hospitalized from 2023 to 2023 initially for abdominal bloating, nausea and leg swelling. She was found to have elevated D-dimer. Subsequent testing revealed right lower lobe pulmonary embolism and right lower extremity DVT. She was at initially treated with IV heparin and later transition to [address] [place] [address]. The patient was brought to ER on 2022 because of progressively increased leg swelling, and nausea/vomiting associated with decreased oral intake. She was found to have BNP of 256.7 and Bumex was prescribed. She then had a fall accident  when she was going to toilet while she was in ER on 2022. The fall resulted severe right hip pain. X-ray of right hip and pelvis revealed acute right femoral intertrochanteric fracture with subtrochanteric extension. Orthopedic service was consulted. Xarelto was held in preparation for surgical management. Cardiology was consulted on 2023 for elevated BNP and Lasix was started. Because of difficulty voiding with urinary retention, urology was also consulted on 2023. Urology service placed Wen with some difficulty on 2023. Therefore Wen was kept in place. On 2/3/2023 the patient underwent open treatment of right intertrochanteric fracture with cephalomedullary nail by Dr. Pascale Baum. She is allowed weightbearing as tolerated at the right lower extremity postoperatively.     Restrictions/Precautions:  Restrictions/Precautions: General Precautions, Fall Risk, Weight Bearing  Right Lower Extremity Weight Bearing: Weight Bearing As Tolerated  Position Activity Restriction  Other position/activity restrictions: Tracheostomy/collar 6L.  With venturi mask for out of room use 8 liters portable O2.     SUBJECTIVE: Patient in recliner upon arrival, agreeable to OT. Reports she has gotten up multiple times today due to diarrhea and is very fatigued from the jose stedy, requesting to work on UE therex instead. Also requested to stay in chair after session for lunch.     PAIN: 10/10: R thigh and hip area, notified RN Charmaine who reports she is up to time on pain medication     Vitals: Vitals not assessed per clinical judgement, see nursing flowsheet    COGNITION: WFL    ADL:   Grooming: with set-up.  Face care and trach hygiene (use of q-tips) .    BALANCE:  Sitting: Mod I in recliner.     ADDITIONAL ACTIVITIES:  Patient completed BUE strengthening exercises with skilled education on HEP: completed x15 reps x1 set with a medium resistive band in all joints and all planes in order to improve UE strength and activity tolerance required for BADL routine and toilet / shower transfers. Patient tolerated well, requiring min rest breaks. Patient also required min cues for technique. HEP handout provided with edu to complete on own as well.        ASSESSMENT:     Activity Tolerance:  Patient tolerance of  treatment: Fair treatment tolerance, Limited by fatigue, and Need for increased rest breaks       Discharge Recommendations: Home with Home Health OT  Equipment Recommendations: Other: Monitor pending progress  Plan: Times Per Week: 5x per week for 90 minutes  Times Per Day: Once a day  Current Treatment Recommendations: Balance training, Functional mobility training, Endurance training, Self-Care / ADL, Safety education & training, Patient/Caregiver education & training, Strengthening    Patient Education  Patient Education: Home Exercise Program and Reviewed Prior  Education    Goals  Short Term Goals  Time Frame for Short Term Goals: until discharge  Short Term Goal 1: Pt will tolerate 12-15 min EOB ADL task with S to improve trunk control and activity tolerance required for EOB ADLs. Short Term Goal 2: Pt will complete sit-stand t/fs with mod A x 1 with minimal cues of technique to progress towards BSC t/fs  Short Term Goal 3: Pt will tolerate standing 1 min with min A for increased ease of clothing management nad LB bathing routine  Short Term Goal 4: Pt will tolerate further assessment of functional t/fs by OTR when appropriate  Long Term Goals  Time Frame for Long Term Goals : 2 weeks from IPR OT eval  Long Term Goal 1: Pt will complete sit-stand t/fs with SBA x 1 with minimal cues of technique to progress towards BSC t/fs  Long Term Goal 2: Pt will perform UB/LB dressing and bathing with Min A and minimal cues for ECT to improve functional independence. Following session, patient left in safe position with all fall risk precautions in place.

## 2023-02-19 NOTE — PROGRESS NOTES
[address] PHYSICAL THERAPY  DAILY NOTE  [address] [facility],[address] Floor - 7E-55/[id number]    Time In: 0730  Time Out: 0800  Timed Code Treatment Minutes: 30 Minutes  Minutes: 30          Date: 2023  Patient Name: Manon Dakin,  Gender:  female        MRN: [id number]  : 1950  (81 y.o.)     Referring Practitioner: Vik Terrazas MD  Diagnosis: closed intertrochanteric fx, Rt  Additional Pertinent Hx: Binu Nick is a 73yoF with PMH of recent PE and b/l DVT, macrocytic anemia, CAD, DM2, tracheostomy dependent, obesity, and HTN who presents for leg swelling, nausea, and constipation. She was recently discharged after an 8 day admission for DVTs and PEs. She was placed on Xarelto when discharged and stated that she has been compliant and only missed one dose. She returned due to chronic constipation, feeling ''bloated'', nausea, and LE edema. While being evaluated in the ED the pt had a mechanical fall and developed a R femur fracture. She was found to have supratheraputic INR at 3.83. CT head and C-spine both clear. Pt is s/p R femur ORIF by Dr Bonilla Smalls . Pt found to have acute fractures of L2-S1 likely 2/2 osteoporosis, ok for activity as tolerated per Dr Ole Saunders . Prior Level of Function:  Lives With: Spouse  Type of Home: House  Home Layout: One level  Home Access: Ramped entrance (or 1 step with no rails.)  Home Equipment: Misael Garza, rolling, Lift chair (Pt sleeps in lift chair and was using it to assist up to stand PTA)   Bathroom Shower/Tub: Walk-in shower  Bathroom Toilet: Standard  Bathroom Equipment: Built-in shower seat, 3-in-1 commode    ADL Assistance: Needs assistance  Homemaking Assistance: Needs assistance  Ambulation Assistance: Independent  Transfer Assistance: Independent  Active : No  Additional Comments: pt amb short distances in the home with RW,  available  and able to assist as needed.  Has a passi valve that she uses at rest.    Restrictions/Precautions:  Restrictions/Precautions: General Precautions, Fall Risk, Weight Bearing  Right Lower Extremity Weight Bearing: Weight Bearing As Tolerated  Position Activity Restriction  Other position/activity restrictions: Tracheostomy/collar 6L. With venturi mask for out of room use 8 liters portable O2. SUBJECTIVE: Pt in bed upon arrival. Bed soiled of urine. Pt pleasant and agreeable to therapy. RN in room to assist.    PAIN: Pt reported stomach cramps but did not quantify. Vitals: Vitals not assessed per clinical judgement, see nursing flowsheet    OBJECTIVE:  Bed Mobility:  Supine to Sit: Minimal Assistance, Moderate Assistance, X 2, with head of bed raised, without rail, with verbal cues , with increased time for completion    Transfers:  Sit to Stand: Minimal Assistance, Dependent, X 2, with Becki Kil, with increased time for completion, cues for hand placement, with verbal cues  Stand to Sit:Minimal Assistance, Dependent, X 1, with Becki Kil, with increased time for completion, cues for hand placement    Ambulation:  Not Tested    Balance:  Static Sitting Balance:  Contact Guard Assistance  Static Standing Balance: Contact Guard Assistance, Dependent, while in Becki Kil, 2 x ~ 1 min    Exercise:  Not performed on this date    Functional Outcome Measures: Not completed       ASSESSMENT:  Assessment: Patient progressing toward established goals. Increased time needed for transfers. During session pt requested to use bedside commode which took increased time. Activity Tolerance:  Patient tolerance of  treatment: fair.       Equipment Recommendations:Equipment Needed: No  Other: Pt has RW, manual w/c and mechanical lift device  Discharge Recommendations: Continue to assess pending progress and Patient would benefit from continued PT at discharge  Plan: Current Treatment Recommendations: Strengthening, Balance training, Functional mobility training, Transfer training, Endurance training, Equipment evaluation, education, & procurement, Patient/Caregiver education & training, Safety education & training, Therapeutic activities, Home exercise program, Wheelchair mobility training, Gait training, Pain management  General Plan:  (5x/ wk 90 min)    Patient Education  Patient Education: Bed Mobility, Transfers    Goals:  Patient Goals : get around o my own without the RW  Short Term Goals  Time Frame for Short Term Goals: 1 wk  Short Term Goal 1: Pt will go from supine <->sit, mod +1 to get in/out of bed. Short Term Goal 2: Pt will get up/down from bed, into JEANNETTE stedy device, +1 mod assist to progress to up to RW  Short Term Goal 3: Pt will  the JEANNETTE stedy device, +1 min assist x5 min, to progress to standing with RW  Short Term Goal 4: Pt will propel w/c >= 50 ft, tile surface, CGA for home mobility  Long Term Goals  Time Frame for Long Term Goals : 3 wks from evaluation. Long Term Goal 1: Pt to go supine <->sit, min +1 to get in/out of bed. Long Term Goal 2: Pt to get up/down from bed or w/c +1 min assist to get up to walk. Long Term Goal 3: Pt to perform stand/pivot transfer with RW, +1 min assist to get in/out of w/c. Long Term Goal 4: Pt to walk with RW >= 10 ft, mn +1 to progress to home mobility  Long Term Goal 5: Pt to propel w/c >= 50 ft, Mod I, for home mobility  Additional Goals?: Yes  Long term goal 6: Pt to get in/out of car, min +1 for transportation needs. Following session, patient left in safe position with all fall risk precautions in place.

## 2023-02-19 NOTE — PLAN OF CARE
Problem: Chronic Conditions and Co-morbidities  Goal: Patient's chronic conditions and co-morbidity symptoms are monitored and maintained or improved  2/19/2023 0029 by Nigel Nolan RN  Outcome: Progressing  Flowsheets (Taken 2/18/2023 2130)  Care Plan - Patient's Chronic Conditions and Co-Morbidity Symptoms are Monitored and Maintained or Improved:   Monitor and assess patient's chronic conditions and comorbid symptoms for stability, deterioration, or improvement   Update acute care plan with appropriate goals if chronic or comorbid symptoms are exacerbated and prevent overall improvement and discharge   Collaborate with multidisciplinary team to address chronic and comorbid conditions and prevent exacerbation or deterioration  2/18/2023 1418 by Victoria Zeng RN  Outcome: Progressing  [address] (Taken 2/18/2023 0985)  Care Plan - Patient's Chronic Conditions and Co-Morbidity Symptoms are Monitored and Maintained or Improved: Monitor and assess patient's chronic conditions and comorbid symptoms for stability, deterioration, or improvement     Problem: Discharge Planning  Goal: Discharge to home or other facility with appropriate resources  2/19/2023 0029 by Nigel Nolan RN  Outcome: Progressing  Flowsheets (Taken 2/18/2023 2130)  Discharge to home or other facility with appropriate resources:   Identify barriers to discharge with patient and caregiver   Arrange for needed discharge resources and transportation as appropriate   Identify discharge learning needs (meds, wound care, etc)   Refer to discharge planning if patient needs post-hospital services based on physician order or complex needs related to functional status, cognitive ability or social support system  2/18/2023 1418 by Victoria Zeng RN  Outcome: Progressing  Flowsheets (Taken 2/18/2023 5517)  Discharge to home or other facility with appropriate resources: Identify barriers to discharge with patient and caregiver     Problem: Pain  Goal: Verbalizes/displays adequate comfort level or baseline comfort level  2/19/2023 0029 by Abigail Castillo RN  Outcome: Progressing  Flowsheets (Taken 2/18/2023 2130)  Verbalizes/displays adequate comfort level or baseline comfort level:   Encourage patient to monitor pain and request assistance   Assess pain using appropriate pain scale   Administer analgesics based on type and severity of pain and evaluate response   Implement non-pharmacological measures as appropriate and evaluate response   Consider cultural and social influences on pain and pain management   Notify Licensed Independent Practitioner if interventions unsuccessful or patient reports new pain  2/18/2023 1418 by Norah Munson RN  Outcome: Progressing     Problem: Metabolic/Fluid and Electrolytes - Adult  Goal: Electrolytes maintained within normal limits  2/19/2023 0029 by Abigail Castillo RN  Outcome: Progressing  Flowsheets (Taken 2/18/2023 2130)  Electrolytes maintained within normal limits:   Monitor labs and assess patient for signs and symptoms of electrolyte imbalances   Fluid restriction as ordered  2/18/2023 1418 by Norah Munson RN  Outcome: Progressing  Flowsheets (Taken 2/18/2023 7432)  Electrolytes maintained within normal limits: Monitor labs and assess patient for signs and symptoms of electrolyte imbalances  Goal: Hemodynamic stability and optimal renal function maintained  2/19/2023 0029 by Abigail Castillo RN  Outcome: Progressing  Flowsheets (Taken 2/18/2023 2130)  Hemodynamic stability and optimal renal function maintained:   Monitor labs and assess for signs and symptoms of volume excess or deficit   Monitor intake, output and patient weight   Instruct patient on fluid and nutrition restrictions as appropriate  2/18/2023 1418 by Norah Munson RN  Outcome: Progressing  Flowsheets (Taken 2/18/2023 0931)  Hemodynamic stability and optimal renal function maintained: Monitor labs and assess for signs and symptoms of volume excess or deficit  Goal: Glucose maintained within prescribed range  2/19/2023 0029 by Negro Serrano, YVETTE  Outcome: Progressing  Flowsheets (Taken 2/18/2023 2130)  Glucose maintained within prescribed range:   Monitor blood glucose as ordered   Administer ordered medications to maintain glucose within target range  2/18/2023 1418 by Mery Ramos RN  Outcome: Progressing  Flowsheets (Taken 2/18/2023 0931)  Glucose maintained within prescribed range: Monitor blood glucose as ordered

## 2023-02-20 PROBLEM — R77.8 ELEVATED TROPONIN: Status: RESOLVED | Noted: 2023-01-21 | Resolved: 2023-02-20

## 2023-02-20 PROBLEM — R79.89 ELEVATED TROPONIN: Status: RESOLVED | Noted: 2023-01-21 | Resolved: 2023-02-20

## 2023-02-20 LAB — GLUCOSE BLD STRIP.AUTO-MCNC: 92 MG/DL (ref 70–108)

## 2023-02-20 PROCEDURE — 97130 THER IVNTJ EA ADDL 15 MIN: CPT

## 2023-02-20 PROCEDURE — 97535 SELF CARE MNGMENT TRAINING: CPT

## 2023-02-20 PROCEDURE — 82948 REAGENT STRIP/BLOOD GLUCOSE: CPT

## 2023-02-20 PROCEDURE — 97530 THERAPEUTIC ACTIVITIES: CPT

## 2023-02-20 PROCEDURE — 6370000000 HC RX 637 (ALT 250 FOR IP): Performed by: NURSE PRACTITIONER

## 2023-02-20 PROCEDURE — 99231 SBSQ HOSP IP/OBS SF/LOW 25: CPT | Performed by: UROLOGY

## 2023-02-20 PROCEDURE — 2580000003 HC RX 258: Performed by: PHYSICAL MEDICINE & REHABILITATION

## 2023-02-20 PROCEDURE — 97116 GAIT TRAINING THERAPY: CPT

## 2023-02-20 PROCEDURE — 6370000000 HC RX 637 (ALT 250 FOR IP): Performed by: FAMILY MEDICINE

## 2023-02-20 PROCEDURE — 97129 THER IVNTJ 1ST 15 MIN: CPT

## 2023-02-20 PROCEDURE — 1180000000 HC REHAB R&B

## 2023-02-20 PROCEDURE — 2700000000 HC OXYGEN THERAPY PER DAY

## 2023-02-20 PROCEDURE — 94761 N-INVAS EAR/PLS OXIMETRY MLT: CPT

## 2023-02-20 PROCEDURE — 99232 SBSQ HOSP IP/OBS MODERATE 35: CPT | Performed by: PHYSICAL MEDICINE & REHABILITATION

## 2023-02-20 PROCEDURE — 97110 THERAPEUTIC EXERCISES: CPT

## 2023-02-20 PROCEDURE — 6370000000 HC RX 637 (ALT 250 FOR IP): Performed by: PHYSICAL MEDICINE & REHABILITATION

## 2023-02-20 RX ADMIN — QUETIAPINE FUMARATE 25 MG: 25 TABLET ORAL at 22:06

## 2023-02-20 RX ADMIN — ACETAMINOPHEN 650 MG: 325 TABLET ORAL at 09:51

## 2023-02-20 RX ADMIN — FUROSEMIDE 20 MG: 20 TABLET ORAL at 09:52

## 2023-02-20 RX ADMIN — ACETAMINOPHEN 650 MG: 325 TABLET ORAL at 22:05

## 2023-02-20 RX ADMIN — ASPIRIN 81 MG 81 MG: 81 TABLET ORAL at 09:52

## 2023-02-20 RX ADMIN — ACETAMINOPHEN 650 MG: 325 TABLET ORAL at 14:15

## 2023-02-20 RX ADMIN — Medication 6 MG: at 22:06

## 2023-02-20 RX ADMIN — METOPROLOL SUCCINATE 25 MG: 25 TABLET, FILM COATED, EXTENDED RELEASE ORAL at 09:51

## 2023-02-20 RX ADMIN — DICLOFENAC SODIUM 2 G: 10 GEL TOPICAL at 14:15

## 2023-02-20 RX ADMIN — NALOXEGOL OXALATE 12.5 MG: 12.5 TABLET, FILM COATED ORAL at 06:29

## 2023-02-20 RX ADMIN — DICLOFENAC SODIUM 2 G: 10 GEL TOPICAL at 17:42

## 2023-02-20 RX ADMIN — RIVAROXABAN 20 MG: 20 TABLET, FILM COATED ORAL at 17:42

## 2023-02-20 RX ADMIN — ROSUVASTATIN 10 MG: 10 TABLET, FILM COATED ORAL at 22:06

## 2023-02-20 RX ADMIN — SODIUM CHLORIDE, PRESERVATIVE FREE 10 ML: 5 INJECTION INTRAVENOUS at 09:54

## 2023-02-20 RX ADMIN — DICLOFENAC SODIUM 2 G: 10 GEL TOPICAL at 22:17

## 2023-02-20 RX ADMIN — FOLIC ACID 1 MG: 1 TABLET ORAL at 09:52

## 2023-02-20 RX ADMIN — FLUCONAZOLE 200 MG: 200 TABLET ORAL at 09:51

## 2023-02-20 RX ADMIN — TRAZODONE HYDROCHLORIDE 100 MG: 100 TABLET ORAL at 22:06

## 2023-02-20 RX ADMIN — INSULIN GLARGINE 26 UNITS: 100 INJECTION, SOLUTION SUBCUTANEOUS at 22:11

## 2023-02-20 RX ADMIN — MICONAZOLE NITRATE: 20 POWDER TOPICAL at 10:29

## 2023-02-20 RX ADMIN — TRAMADOL HYDROCHLORIDE 100 MG: 50 TABLET, COATED ORAL at 06:29

## 2023-02-20 RX ADMIN — PANTOPRAZOLE SODIUM 40 MG: 40 TABLET, DELAYED RELEASE ORAL at 06:29

## 2023-02-20 RX ADMIN — MICONAZOLE NITRATE: 20 POWDER TOPICAL at 22:21

## 2023-02-20 RX ADMIN — DICLOFENAC SODIUM 2 G: 10 GEL TOPICAL at 09:52

## 2023-02-20 RX ADMIN — Medication 1 TABLET: at 09:51

## 2023-02-20 RX ADMIN — SODIUM CHLORIDE, PRESERVATIVE FREE 10 ML: 5 INJECTION INTRAVENOUS at 22:10

## 2023-02-20 ASSESSMENT — PAIN SCALES - GENERAL
PAINLEVEL_OUTOF10: 10
PAINLEVEL_OUTOF10: 9
PAINLEVEL_OUTOF10: 8
PAINLEVEL_OUTOF10: 7
PAINLEVEL_OUTOF10: 3
PAINLEVEL_OUTOF10: 0

## 2023-02-20 ASSESSMENT — PAIN DESCRIPTION - ORIENTATION
ORIENTATION: RIGHT

## 2023-02-20 ASSESSMENT — PAIN DESCRIPTION - DESCRIPTORS
DESCRIPTORS: ACHING

## 2023-02-20 ASSESSMENT — ENCOUNTER SYMPTOMS
TROUBLE SWALLOWING: 0
COUGH: 0
RHINORRHEA: 0
BACK PAIN: 1
WHEEZING: 0
CONSTIPATION: 0
SHORTNESS OF BREATH: 0
ABDOMINAL PAIN: 0
VOMITING: 0
SORE THROAT: 0

## 2023-02-20 ASSESSMENT — PAIN SCALES - WONG BAKER
WONGBAKER_NUMERICALRESPONSE: 0
WONGBAKER_NUMERICALRESPONSE: 0

## 2023-02-20 ASSESSMENT — PAIN DESCRIPTION - PAIN TYPE: TYPE: ACUTE PAIN

## 2023-02-20 ASSESSMENT — PAIN - FUNCTIONAL ASSESSMENT
PAIN_FUNCTIONAL_ASSESSMENT: ACTIVITIES ARE NOT PREVENTED

## 2023-02-20 ASSESSMENT — PAIN DESCRIPTION - LOCATION
LOCATION: HIP
LOCATION: HIP
LOCATION: BACK;KNEE
LOCATION: KNEE

## 2023-02-20 ASSESSMENT — PAIN DESCRIPTION - FREQUENCY: FREQUENCY: CONTINUOUS

## 2023-02-20 ASSESSMENT — PAIN DESCRIPTION - ONSET: ONSET: ON-GOING

## 2023-02-20 NOTE — PROGRESS NOTES
[address] [facility] PHYSICAL THERAPY  DAILY NOTE  [address] [facility],4Th Floor - 7E-55/055-A    Time In: 0900  Time Out: 0930  Timed Code Treatment Minutes: 30 Minutes  Minutes: 30          Date: 2023  Patient Name: Nabila Calvo,  Gender:  female        MRN: [id number]  : 1950  (81 y.o.)     Referring Practitioner: Medina Terrell MD  Diagnosis: closed intertrochanteric fx, Rt  Additional Pertinent Hx: Chon Sears is a 73yoF with PMH of recent PE and b/l DVT, macrocytic anemia, CAD, DM2, tracheostomy dependent, obesity, and HTN who presents for leg swelling, nausea, and constipation. She was recently discharged after an 8 day admission for DVTs and PEs. She was placed on Xarelto when discharged and stated that she has been compliant and only missed one dose. She returned due to chronic constipation, feeling ''bloated'', nausea, and LE edema. While being evaluated in the ED the pt had a mechanical fall and developed a R femur fracture. She was found to have supratheraputic INR at 3.83. CT head and C-spine both clear. Pt is s/p R femur ORIF by Dr Derian Souza . Pt found to have acute fractures of L2-S1 likely 2/2 osteoporosis, ok for activity as tolerated per Dr Todd Shields . Prior Level of Function:  Lives With: Spouse  Type of Home: House  Home Layout: One level  Home Access: Ramped entrance (or 1 step with no rails.)  Home Equipment: Umberto Cowden, rolling, Lift chair (Pt sleeps in lift chair and was using it to assist up to stand PTA)   Bathroom Shower/Tub: Walk-in shower  Bathroom Toilet: Standard  Bathroom Equipment: Built-in shower seat, 3-in-1 commode    ADL Assistance: Needs assistance  Homemaking Assistance: Needs assistance  Ambulation Assistance: Independent  Transfer Assistance: Independent  Active : No  Additional Comments: pt amb short distances in the home with RW,  available / and able to assist as needed.  Has a passi valve that she uses at rest.    Restrictions/Precautions:  Restrictions/Precautions: General Precautions, Fall Risk, Weight Bearing  Right Lower Extremity Weight Bearing: Weight Bearing As Tolerated  Position Activity Restriction  Other position/activity restrictions: Tracheostomy/collar 6L. With venturi mask for out of room use 8 liters portable O2. SUBJECTIVE: Patient in room in recliner, agreeable to PT. Pt cooperative and pleasant, agreeable to trial standing to RW. PAIN: right hip--not rated    Vitals: Vitals not assessed per clinical judgement, see nursing flowsheet    OBJECTIVE:  Bed Mobility:  Not Tested    Transfers:  Sit to Stand: Minimal Assistance, X 2 --> moderate assist of 1 to a RW--verbal cues for hand placement  Stand to Sit:Minimal Assistance, X 1, X 2--verbal cues for hand placement  *Pt completed multiple sit < > stand transfers to RW, total of 4. [facility] self better in recliner with min assist x1. Ambulation:  Not Tested    Balance:  Static Standing Balance: Contact Guard Assistance, Minimal Assistance    Exercise:  Patient was guided in 1 set(s) 10 reps of exercise to both lower extremities. Seated: long arc quad--2 sets of 5  Exercises were completed for increased independence with functional mobility. Functional Outcome Measures: Not completed       ASSESSMENT:  Assessment: Patient progressing toward established goals. Activity Tolerance:  Patient tolerance of  treatment: good.       Equipment Recommendations:Equipment Needed: No  Other: Pt has RW, manual w/c and mechanical lift device  Discharge Recommendations: Continue to assess pending progress and Patient would benefit from continued PT at discharge  Plan: Current Treatment Recommendations: Strengthening, Balance training, Functional mobility training, Transfer training, Endurance training, Equipment evaluation, education, & procurement, Patient/Caregiver education & training, Safety education & training, Therapeutic activities, Home exercise program, Wheelchair mobility training, Gait training, Pain management  General Plan:  (5x/ wk 90 min)  Plan to trial steps in parallel bars next session    Patient Education  Patient Education: Transfers, Verbal Exercise Instruction,  - Patient Verbalized Understanding, - Patient Requires Continued Education    Goals:  Patient Goals : get around o my own without the RW  Short Term Goals  Time Frame for Short Term Goals: 1 wk  Short Term Goal 1: Pt will go from supine <->sit, mod +1 to get in/out of bed. Short Term Goal 2: Pt will get up/down from bed, into JEANNETTE stedy device, +1 mod assist to progress to up to RW  Short Term Goal 3: Pt will  the JEANNETTE stedy device, +1 min assist x5 min, to progress to standing with RW  Short Term Goal 4: Pt will propel w/c >= 50 ft, tile surface, CGA for home mobility  Long Term Goals  Time Frame for Long Term Goals : 3 wks from evaluation. Long Term Goal 1: Pt to go supine <->sit, min +1 to get in/out of bed. Long Term Goal 2: Pt to get up/down from bed or w/c +1 min assist to get up to walk. Long Term Goal 3: Pt to perform stand/pivot transfer with RW, +1 min assist to get in/out of w/c. Long Term Goal 4: Pt to walk with RW >= 10 ft, mn +1 to progress to home mobility  Long Term Goal 5: Pt to propel w/c >= 50 ft, Mod I, for home mobility  Additional Goals?: Yes  Long term goal 6: Pt to get in/out of car, min +1 for transportation needs. Following session, patient left in safe position with all fall risk precautions in place.

## 2023-02-20 NOTE — PROGRESS NOTES
[address]  Occupational Therapy  Daily Note  Time:    Time In: 0700  Time Out: 0800  Timed Code Treatment Minutes: 60 Minutes  Minutes: 60          Date: 2023  Patient Name: Rita Pierre,   Gender: female      Room: [facility]55/055-A  MRN: [id number]  : 1950  (81 y.o.)  Referring Practitioner: Dary Cobian MD  Diagnosis: closed intertrochanteric fracture, right  Additional Pertinent Hx: The patient was recently hospitalized from 2023 to 2023 initially for abdominal bloating, nausea and leg swelling. She was found to have elevated D-dimer. Subsequent testing revealed right lower lobe pulmonary embolism and right lower extremity DVT. She was at initially treated with IV heparin and later transition to [address] [place] [address]. The patient was brought to ER on 2022 because of progressively increased leg swelling, and nausea/vomiting associated with decreased oral intake. She was found to have BNP of 256.7 and Bumex was prescribed. She then had a fall accident  when she was going to toilet while she was in ER on 2022. The fall resulted severe right hip pain. X-ray of right hip and pelvis revealed acute right femoral intertrochanteric fracture with subtrochanteric extension. Orthopedic service was consulted. Xarelto was held in preparation for surgical management. Cardiology was consulted on 2023 for elevated BNP and Lasix was started. Because of difficulty voiding with urinary retention, urology was also consulted on 2023. Urology service placed Wen with some difficulty on 2023. Therefore Wen was kept in place. On 2/3/2023 the patient underwent open treatment of right intertrochanteric fracture with cephalomedullary nail by Dr. Rom Roman. She is allowed weightbearing as tolerated at the right lower extremity postoperatively.     Restrictions/Precautions:  Restrictions/Precautions: General Precautions, Fall Risk, Weight Bearing  Right Lower Extremity Weight Bearing: Weight Bearing As Tolerated  Position Activity Restriction  Other position/activity restrictions: Tracheostomy/collar 6L. With venturi mask for out of room use 8 liters portable O2. SUBJECTIVE: Pt was resting in bed upon arrival, pleasant and agreeable to OT. PAIN: Denies    Vitals: Vitals not assessed per clinical judgement, see nursing flowsheet    COGNITION: WFL    ADL:   Grooming: with set-up. Seated upright, for face care and trach hygiene  Bathing: Moderate Assistance. A for bottom/flaquito area and B feet while seated on [facility]  Upper Extremity Dressing: Minimal Assistance. To pull dress down  Footwear Management: Dependent. [place]/donning slipper socks  Toileting: Maximum Assistance. For hygiene after toileting, CGA for standing balance. Toilet Transfer: Minimal Assistance. To/from [facility] within [address] [place] [address]  ** During BADL routine, pt noted to have open, bleeding, wound in suprapubic area under skin fold (different from the one to L that has been documented). YVETTE Rogers notified upon completion of bathing routine. Lucina Bartlett BALANCE:  Sitting Balance:  Stand By Assistance. On EOB  Standing Balance: Contact Guard Assistance. Within [facility] MOBILITY:  Supine to Sit: Minimal Assistance with increased time  Scooting: Minimal Assistance to bring R hip to EOB while seated upright    TRANSFERS:  Sit to Stand:  Minimal Assistance. Within [place], [place] for technique  Stand to Sit: [employer]. From [facility] for technique      ASSESSMENT:     Activity Tolerance:  Patient tolerance of  treatment: Good treatment tolerance       Discharge Recommendations: Continue to assess pending progress and Patient would benefit from continued OT at discharge  Equipment Recommendations: Other: Monitor pending progress  Plan: Times Per Week: 5x per week for 90 minutes  Times Per Day:  Once a day  Current Treatment Recommendations: Balance training, Functional mobility training, Endurance training, Self-Care / ADL, Safety education & training, Patient/Caregiver education & training, Strengthening    Patient Education  Patient Education: Plan of Care, ADL's, Energy Conservation, Reviewed Prior Education, Importance of Increasing Activity, and Assistive Device Safety    Goals  Short Term Goals  Time Frame for Short Term Goals: until discharge  Short Term Goal 1: Pt will tolerate 12-15 min EOB ADL task with S to improve trunk control and activity tolerance required for EOB ADLs. Short Term Goal 2: Pt will complete sit-stand t/fs with mod A x 1 with minimal cues of technique to progress towards BSC t/fs  Short Term Goal 3: Pt will tolerate standing 1 min with min A for increased ease of clothing management nad LB bathing routine  Short Term Goal 4: Pt will tolerate further assessment of functional t/fs by OTR when appropriate  Long Term Goals  Time Frame for Long Term Goals : 2 weeks from IPR OT eval  Long Term Goal 1: Pt will complete sit-stand t/fs with SBA x 1 with minimal cues of technique to progress towards BSC t/fs  Long Term Goal 2: Pt will perform UB/LB dressing and bathing with Min A and minimal cues for ECT to improve functional independence. Following session, patient left in safe position with all fall risk precautions in place.

## 2023-02-20 NOTE — PROGRESS NOTES
No issues with werner  Draining well since adjustment last wk  Keep in until mobility improves, discussed with pt

## 2023-02-20 NOTE — PROGRESS NOTES
[address] NOTE    Conference Date: 2023  Admit Date:  2/10/2023  2:59 PM  Patient Name: Nimo Maloney    MRN: [id number]    : 1950  (81 y.o.)  Rehabilitation Admitting Diagnosis:  Closed intertrochanteric fracture, right, initial encounter [facility]) [I86.952Y]  Referring Practitioner: Shira Bustos MD      CASE MANAGEMENT  Current issues/needs regarding patient and family discharge status: Patient continues to be motivated and progressing with therapy. Patient plans to be discharged on Wednesday, 3/1 with home health vs SNF services. SW will follow and maintain involvement in discharge planning. PHYSICAL THERAPY  Mrs Steven Jarquin is making gradual progress towards goals set for her in PT, meeting 2/4 STG's and 0/6 LTG's. Over the last week, pt has progressed sit < > stand from min assist x2 with Bro Rist to min assist x2 to a RW and now is able to initiate ambulation with a RW, but limited to a few steps to transfer surface to surface. Patient fatigues quickly and is limited also by right hip/thigh pain. Pt's standing ability is limited as well by weakness and fatigue and therefore requires frequent seated rest break and positive encouragement. Mrs Steven Jarquin would benefit from continued skilled PT services to continue to improve her ability to complete functional mobility, reduce her risk for falls and allow patient to return to home safely. Equipment Needed: No  Other: Pt has RW, manual w/c and mechanical lift device    SPEECH THERAPY  Patient has met 3/4 STGs and 0/1 LTGs this reporting period. Patient has made gains in recall, math computations, thought organization, sequencing, and attention. Patient continues to demonstrate deficits in complex executive functioning and often requires cuing for visual scanning and attention to detail. Expressive and receptive language [facility]. Speech and voice WFL with utilization of PMV.  Patient consuming a regular texture diet and thin liquids. Patient would benefit from continued skilled ST services to address aforementioned cognitive skills. At this time, patient would benefit from skilled ST services via OP upon discharge, clearance from physician and driving evaluation prior to return to driving, and supervision with IADLs. Diego SKimberly is making slow steady progress towards therapy goals during stay on IPR thus far, meeting 3/5 STGs. She has made progression in UB dressing with ability to complete with set up while seated upright. She continues to require maxA for LB dressing/bathing tasks. Adan Rodriguez continues to be dep for footwear mgmt. Adan Rodriguez currently requires Mod - Max A for toileting hygiene, requiring multiple standing trials d/t fatigue and A with hygiene and clothing mgmt. Adan Rodriguez continues to use the Fatemeh Stai at times; however, has progressed to completion of functional t/fs with Min A x2 and short disance functional mobility with RW and Min A x2. She continues to exhibit decreased strength, activity tolerance, increase of pain in RLE with movement, increased need for assist during BADL routine which limits her from meeting her goals. Adan Rodriguez is typically independent with BADL routine with SBA from  for safety measures. She is currently using 6L via trach mask. Pt continues to require skilled OT intervention to improve strength, activity tolerance, safety awareness, ADL/IADL performance required for pt to return home at [facility]. Without skilled services patient is at risk for falls, decrease in overall function and increased caregiver burden. Other: Monitor pending progress    RECREATIONAL THERAPY  Patient has been offered participation in recreational therapy activities and participates as able. Pt states she loves flowers-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-she likes to get on facebook and she enjoys [employer] and has book of puzzles in [address] [place] [address] her grandchildren visit they like to color together -pt pleasant-easily fatigued and towards the end of our time together she started to fall asleep-pt loves her rest -she enjoyed petting our pet therapy dog May this afternoon briefly while working with OT -Pt enjoyed getting her hair washed, trimmed and styled by our beautician this am-affect bright and social-so appreciative      NUTRITION  Weight: 219 lb 1.6 oz (99.4 kg) / Body mass index is 40.07 kg/m². Current diet: ADULT DIET; Regular; 4 carb choices (60 gm/meal); Low Sodium (2 gm); 2000 ml  ADULT ORAL NUTRITION SUPPLEMENT; Breakfast, Dinner, Lunch; Diabetic Oral Supplement  Please see nutrition note for details. NURSING  Continent of Bowel: Yes. Frequency: large loose 2 time. Management: on hold due to water stools 2/21. Continent of Bladder: Yes. Frequency: N/A. Management: Wen. Pain is Managed:  Yes. Management: Tramadol . Frequency of Intervention: PRN. Adequately Controlled: Yes  Sleep: Adequate Melatonin and Seraquel  Signs and Symptoms of Infection:  No.   Signs and Symptoms of Skin Breakdown:  Yes. Site: buttock. Wound Care: Triad cream-healing . Injury and/or Falls during Inpatient Rehabilitation Admission: No  Anticoagulants: Xarelto  Diabetic: Yes: Home Meds: Novolog pen 20-25 units before meals and Lantus 22 units at HS . Hospital Meds: Lantus 26 units @ HS. Educational Needs: none. Consultations/Labs/X-rays: AM chems  Oxygen while on IP Rehab:  Yes Currently using  5 liters with 28% humidity liters per 5  . Home oxygen: Yes  Patient/Family Education Focus: safety   Barriers to Education: compliance.     Recent Labs     02/20/23  0739 02/21/23  0831 02/22/23  0740   POCGLU 92 99 85       No results found for: LDLCALC, LDLCHOLESTEROL, LDLDIRECT      Vitals:    02/21/23 0847 02/21/23 2115 02/22/23 0612 02/22/23 0839   BP: 121/66 105/60  125/80   Pulse: 92 78  92   Resp: 20 20 18   Temp: 98.2 °F (36.8 °C) 98.2 °F (36.8 °C)  98.1 °F (36.7 °C)   TempSrc: Oral Oral     SpO2: 92% 94%  100%   Weight:   219 lb 1.6 oz (99.4 kg)    Height:              Family Education: Family available and participating in education   Fall Risk:  Falling star program initiated  Is the patient appropriate for a stay in the functional apartment? no    Discharge Plan   Estimated Discharge Date:  2/27/2023    Destination: skilled nursing facility, home health, and discharge home with supervision  Services at Discharge: Other to be determine ; if discharge to home then home health with physical therapy, occupational therapy & speech therapy, nursing and HHA 3 times weekly  Is patient appropriate for an outpatient driving evaluation? yes,  but until after clearance by surgeon  Equipment at Discharge: Other: Monitor pending progress  Other: Pt has RW, manual w/c and mechanical lift device  Factors facilitating achievement of predicted outcomes: Family support, Motivated, Cooperative, and Pleasant  Barriers to the achievement of predicted outcomes: Pain, decreased motivation, Unrealistic expectations, Decreased endurance, Lower extremity weakness, Incontinence of bladder, Medical complications, Stairs at home, Skin Care, Wound Care, and tracheostomy status  Follow up with physiatrist? no  If yes, what timeframe? N/A    Team Members Present at Conference:  [address], LSW, MSW   Occupational Therapist:Corie Frost OTR/L [address]  [address] [place] [address], [address]  Nurse: Shira Cortés RN  Psychologist: Miller Benavides, PhD.    I approve the established interdisciplinary plan of care as documented within the medical record of Kimberly Wiggins.     Calderon Ordoñez MD

## 2023-02-20 NOTE — PROGRESS NOTES
[address]  Occupational Therapy  Daily Note  Time:   Time In: 1200  Time Out: 1230  Timed Code Treatment Minutes: 30 Minutes  Minutes: 30          Date: 2023  Patient Name: Colin Craven,   Gender: female      Room: -55/055-A  MRN: [id number]  : 1950  (81 y.o.)  Referring Practitioner: Rey Michael MD  Diagnosis: closed intertrochanteric fracture, right  Additional Pertinent Hx: The patient was recently hospitalized from 2023 to 2023 initially for abdominal bloating, nausea and leg swelling. She was found to have elevated D-dimer. Subsequent testing revealed right lower lobe pulmonary embolism and right lower extremity DVT. She was at initially treated with IV heparin and later transition to [address] [place] [address]. The patient was brought to ER on 2022 because of progressively increased leg swelling, and nausea/vomiting associated with decreased oral intake. She was found to have BNP of 256.7 and Bumex was prescribed. She then had a fall accident  when she was going to toilet while she was in ER on 2022. The fall resulted severe right hip pain. X-ray of right hip and pelvis revealed acute right femoral intertrochanteric fracture with subtrochanteric extension. Orthopedic service was consulted. Xarelto was held in preparation for surgical management. Cardiology was consulted on 2023 for elevated BNP and Lasix was started. Because of difficulty voiding with urinary retention, urology was also consulted on 2023. Urology service placed Wen with some difficulty on 2023. Therefore Wen was kept in place. On 2/3/2023 the patient underwent open treatment of right intertrochanteric fracture with cephalomedullary nail by Dr. Tee Dugan. She is allowed weightbearing as tolerated at the right lower extremity postoperatively.     Restrictions/Precautions:  Restrictions/Precautions: General Precautions, Fall Risk, Weight Bearing  Right Lower Extremity Weight Bearing: Weight Bearing As Tolerated  Position Activity Restriction  Other position/activity restrictions: Tracheostomy/collar 6L. With venturi mask for out of room use 8 liters portable O2. SUBJECTIVE: Patient sleeping in bed upon arrival with minimal verbal stimulation to alert with increased time to alert. Patient pleasant and cooperative. PAIN: no indication of pain    Vitals: Nurse checked vitals prior to session    COGNITION: WFL    ADL:   No ADL's completed this session. Lissa Leone BALANCE:  Sitting Balance:  Supervision. Standing Balance: Stand By Assistance. Within jose stedy, leans on forearm    BED MOBILITY:  Sit to Supine: Moderate Assistance, X 1, with head of bed raised, with rail, with verbal cues , with increased time for completion      TRANSFERS:  Sit to Stand:  Minimal Assistance, X 1, with Rosy Edis. Assistance initially  Stand to Sit: [employer], X 1, with Rosy Edis. FUNCTIONAL MOBILITY:  Assistive Device: jose [employer]  Assist Level:  Dependent. Distance:  bed to bedside chair    ADDITIONAL ACTIVITIES:  Patient completed BUE strengthening exercises with skilled education on HEP: completed x10 reps x1 set with a moderate resistance band in all joints and all planes in order to improve UE strength and activity tolerance required for BADL routine and toilet / shower transfers. Patient tolerated good, requiring minimal rest breaks. Patient also required minimal verbal/visual cues for technique. ASSESSMENT:     Activity Tolerance:  Patient tolerance of  treatment: Fair treatment tolerance      Discharge Recommendations: Continue to assess pending progress  Equipment Recommendations: Other: Monitor pending progress  Plan: Times Per Week: 5x per week for 90 minutes  Times Per Day:  Once a day  Current Treatment Recommendations: Balance training, Functional mobility training, Endurance training, Self-Care / ADL, Safety education & training, Patient/Caregiver education & training, Strengthening    Patient Education  Patient Education: Role of OT, Plan of Care, ADL's, Energy Conservation, Home Exercise Program, Home Safety, and Importance of Increasing Activity    Goals  Short Term Goals  Time Frame for Short Term Goals: until discharge  Short Term Goal 1: Pt will tolerate 12-15 min EOB ADL task with S to improve trunk control and activity tolerance required for EOB ADLs. Short Term Goal 2: Pt will complete sit-stand t/fs with mod A x 1 with minimal cues of technique to progress towards BSC t/fs  Short Term Goal 3: Pt will tolerate standing 1 min with min A for increased ease of clothing management nad LB bathing routine  Short Term Goal 4: Pt will tolerate further assessment of functional t/fs by OTR when appropriate  Long Term Goals  Time Frame for Long Term Goals : 2 weeks from IPR OT eval  Long Term Goal 1: Pt will complete sit-stand t/fs with SBA x 1 with minimal cues of technique to progress towards BSC t/fs  Long Term Goal 2: Pt will perform UB/LB dressing and bathing with Min A and minimal cues for ECT to improve functional independence. Following session, patient left in safe position with all fall risk precautions in place.

## 2023-02-20 NOTE — PROGRESS NOTES
[address] [facility]  [facility]  DAILY NOTE    TIME   SLP Individual Minutes  Time In: 0830  Time Out: 0900  Minutes: 30  Timed Code Treatment Minutes: 30 Minutes       Date: 2023  Patient Name: Emilee Turner      CSN: [id number]   : 1950  (81 y.o.)  Gender: female   Referring Physician:  Teodora Donato MD  Diagnosis: Closed intertrochanteric fracture, right,  initial encounter  Precautions: Fall risk  Current Diet: Regular and Thin Liquids   Swallowing Strategies: Standard Universal Swallow Precautions  Date of Last MBS/FEES: Not Applicable    Pain:   - Pain location: lower back/hip - RN aware      Subjective:  Patient sitting in recliner upon ST arrival. Agreeable to skilled ST services. Pleasant, cooperative, and engaged; however, expressing fatigue throughout session. Short-Term Goals:  SHORT TERM GOAL #1:  REVISED: Goal 1: Patient will complete functional problem solving and reasoning tasks with 80% accuracy and min cuing in order to improve completion of ADLs/IADLs at discharge.   INTERVENTIONS:   Money Management - [employer]  1/5 independent, 4/5 given min-mod cues    *patient with decreased visual scanning and attention to detail; required cuing to locate correct ticket prices  *patient with good mental math computations for addition problems; utilized pen/paper for multiplication problem with good success    Time Management - [email or web address]  2/ given min-mod cues    *patient with decreased visual scanning; required min cuing to locate correct times   *patient with decreased math computations of time variables; utilized pen/paper for math computation; however, required mod cuing to solve math computation of time variables  *task was not finished on this date; ST will complete on subsequent date    SHORT TERM GOAL #2:  REVISED: Goal 2: Patient will complete functional immediate, working, and delayed recall tasks of 4+ units of information, with or without use of trained recall strategies, with 80% accuracy given min cues to improve retention of pertinent medical information. INTERVENTIONS:   Delayed Recall - Grocery List   (Milk, Eggs, Apples, Steak, Toilet Paper, Tylenol)  Delayed Recall (~3 days): 4/6 independently, 1/6 given min cues, 1/6 with utilization of list     *patient with recall of writing list on notepad; however, required total assist to locate list     SHORT TERM GOAL #3:  REVISED: Goal 3: Patient will complete functional thought organization and sequencing exercises with 85% accuracy and min cuing  in order to improve  completion  of  ADLs/IADLs. INTERVENTIONS: Did not address d/t focus on other goals    SHORT TERM GOAL #4:  Goal 4: Patient will complete sustained, selective, alternating, and divided attention tasks with no more than three  errors/redirections in five minutes/one task in order to allow for safe return to driving at discharge. INTERVENTIONS:   Divided Attention - Cross out [employer]  Patient with x1 error in 3:24    *patient independently ID error when given  min cue to scan for missed letter    Long-Term Goals:  Time Frame for Long Term Goals: 3 weeks    LONG TERM GOAL #1:  Goal 1: Patient will improve cognitive  functioning  to a level of modified independent in order to allow for safe return to PLOF.         Comprehension: 5 - Patient understands basic needs (hungry/hot/pain)  Expression: 5 - Expresses basic ideas/needs only (hungry/hot/pain)  Social Interaction: 5 - Patient is appropriate with supervision/cues  Problem Solvin - Patient solves simple/routine tasks 75-90%+   Memory: 3 - Patient remembers 50%-74% of the time    EDUCATION:  Learner: Patient  Education:  Reviewed ST goals and Plan of Care and Education Related to [employer] [facility]  Evaluation of Education: Verbalizes understanding, Demonstrates with assistance, and Needs further instruction    ASSESSMENT/PLAN:  Activity Tolerance:  Patient tolerance of  treatment: good. Assessment/Plan: Patient progressing toward established goals. Continues to require skilled care of licensed speech pathologist to progress toward achievement of established goals and plan of care. Plan for Next Session: recall, complete time management, attention, reasoning  Discharge Recommendations: Outpatient Speech Therapy vs [address] Valerio CARSON  Clinician

## 2023-02-20 NOTE — PLAN OF CARE
Problem: Chronic Conditions and Co-morbidities  Goal: Patient's chronic conditions and co-morbidity symptoms are monitored and maintained or improved  2/20/2023 1339 by Irene Fuentes RN  Outcome: Progressing     Problem: Discharge Planning  Goal: Discharge to home or other facility with appropriate resources  2/20/2023 1339 by Irene Fuentes RN  Outcome: Progressing     Problem: Skin/Tissue Integrity  Goal: Absence of new skin breakdown  Description: 1. Monitor for areas of redness and/or skin breakdown  2. Assess vascular access sites hourly  3. Every 4-6 hours minimum:  Change oxygen saturation probe site  4. Every 4-6 hours:  If on nasal continuous positive airway pressure, respiratory therapy assess nares and determine need for appliance change or resting period. 2/20/2023 1339 by Irene Fuentes RN  Outcome: Progressing     Problem: Safety - Adult  Goal: Free from fall injury  2/20/2023 1339 by Irene Fuentes RN  Outcome: Progressing  Falling star prevention in place. Bed and chair alarms in use. Call light in reach. Purposeful hourly rounding. Problem: Pain  Goal: Verbalizes/displays adequate comfort level or baseline comfort level  2/20/2023 1339 by Irene Fuentes RN  Outcome: Progressing  Flowsheets (Taken 2/20/2023 [id number])  Verbalizes/displays adequate comfort level or baseline comfort level:   Encourage patient to monitor pain and request assistance   Assess pain using appropriate pain scale   Implement non-pharmacological measures as appropriate and evaluate response   Administer analgesics based on type and severity of pain and evaluate response     Problem: Nutrition Deficit:  Goal: Optimize nutritional status  2/20/2023 1339 by Irene Fuentes RN  Outcome: Progressing  Tolerating current diet and po fluids well.     Problem: Metabolic/Fluid and Electrolytes - Adult  Goal: Hemodynamic stability and optimal renal function maintained  2/20/2023 1339 by Asa Burk Arden Hansen RN  Outcome: Progressing  Vital signs, intake and output, and labs monitored for hemodynamic stability. Problem: Metabolic/Fluid and Electrolytes - Adult  Goal: Glucose maintained within prescribed range  2/20/2023 1339 by Vianney Romero RN  Outcome: Progressing  Ability to maintain appropriate glucose levels improving since admission.

## 2023-02-20 NOTE — PROGRESS NOTES
Physical Medicine & Rehabilitation Progress Note    Chief Complaint:  Right hip fracture, low back compression fracture    Subjective:    Whitney Mcgee is a 81 y.o. 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 The patient previously was admitted to inpatient rehab service from 2022 to 2022 due to critical care myopathy and debility/physical decondition as result of COVID-19 pneumonia. She was discharged to home with referral for home care service on 2022. The patient developed progressive difficulty breathing in 2022 and was found to have glottic stenosis and eventually received tracheostomy by Dr. Janae Goodman on 2022. The patient's  says the patient also developed progressively worsening lower back pain in summer 2022 and saw orthopedics surgeon at [facility]. The patient has been says the patient was diagnosed of having \"lumbar spine stress fracture\". No surgical intervention was performed. She was treated with brace which she later abandoned due to discomfort associate with brace usage.      The patient was recently hospitalized from 2023 to 2023 initially for abdominal bloating, nausea and leg swelling. She was found to have elevated D-dimer. Subsequent testing revealed right lower lobe pulmonary embolism and right lower extremity DVT. She was at initially treated with IV heparin and later transition to [address] [place] [address]. The patient was brought to ER on 2/1/2022 because of progressively increased leg swelling, and nausea/vomiting associated with decreased oral intake. She was found to have BNP of 256.7 and Bumex was prescribed. She then had a fall accident  when she was going to toilet while she was in ER on 2/1/2022. The fall resulted severe right hip pain. X-ray of right hip and pelvis revealed acute right femoral intertrochanteric fracture with subtrochanteric extension. Orthopedic service was consulted. Xarelto was held in preparation for surgical management. Cardiology was consulted on 2/2/2023 for elevated BNP and Lasix was started. Because of difficulty voiding with urinary retention, urology was also consulted on 2/2/2023. Urology service placed Wen with some difficulty on 2/2/2023. Therefore Wen was kept in place. On 2/3/2023 the patient underwent open treatment of right intertrochanteric fracture with cephalomedullary nail by Dr. Maya Lopes. She is allowed weightbearing as tolerated at the right lower extremity postoperatively. PM&R was consulted on 2/5/2023. During the evaluation the patient's  said that the patient began having progressively worsened low back pain starting in summer 2022 began interfering the patient's daily function. Therefore she is sore orthopedic physician at [facility]. Imaging tests were done and the patient was told that she had \" stress fracture\" in her spine. She was provided with a lumbar spine brace to wear but she was not compliant with spine brace application because of discomfort it produced while she was wearing it.   Because no outside lumbar spine images study report was available for review, x-ray of lumbar spine was ordered and performed on 2/6/2023 and showed moderate vertebral body spondylosis in the lumbar and lower thoracic spine, moderate degenerative facet arthropathy involving at least lower 3 lumbar levels, lumbar spine osteoporosis, mild superior endplate depression fracture at L2, L3 and L4. Lumbar spine MRI was recommended by radiologist.  Therefore primary team ordered lumbar spine MRI which was performed this afternoon revealing acute fracture with a fluid cleft associated with endplate at K4-V7 levels with mild height loss at each vertebral bodies. The patient was found to have severe anemia with Hgb/Hct dropped from 7.3/24 on 2/4/2023 down to 5.1/16.9 on 2/6/2023. Therefore she was given blood transfusion for few units. CT of abdomen and pelvis was ordered to rule out possible intra-abdominal source of blood loss. CT abdomen and pelvis done on 2/8/2023 show only pericolonic inflammation with diverticula related to acute diverticulitis, acute on chronic compression fracture of L2, L3, L4 and L5, mild, and marked osteopenia. The patient's hospital course also complicated by intermittent confusion, and development of gluteus region skin breakdown. The patient says she felt well yesterday and to some right leg exercise. She says her right hip pain got worse afterward. She still complains of pain at her right-sided low back, right hip and right groin area. The pain is a sore pain sensation with intensity rated at 7-10/10 level. She is still on Tylenol every 6 hours. She took tramadol 100 mg twice yesterday. She thinks her right lower extremity is getting stronger. She says she had a bowel movement over the weekend. She denies having abdominal pain now. She continues tolerating the intensive inpatient rehabilitation treatment. The patient is projected be ready for discharge on 2/24/2023      Rehabilitation:  PT: Reviewed.     Bed Mobility:  Supine to Sit: Minimal Assistance, Moderate Assistance, X 2, with head of bed raised, without rail, with verbal cues , with increased time for completion    Transfers:  Sit to Stand: Minimal Assistance, X 2 --> moderate assist of 1 to a RW--verbal cues for hand placement  Stand to Sit:Minimal Assistance, X 1, X 2--verbal cues for hand placement  *Pt completed multiple sit < > stand transfers to RW, total of 4. Pt stood a final time to Atha Vickyl to position self better in recliner with min assist x1. Ambulation:  Not Tested     Balance:  Static Sitting Balance:  Contact Guard Assistance  Static Standing Balance: Contact Guard Assistance, Minimal Assistance       OT: Reviewed. ADL:   Grooming: with set-up. Seated upright, for face care and trach hygiene  Bathing: Moderate Assistance. A for bottom/flaquito area and B feet while seated on [facility] [place]  Upper Extremity Dressing: Minimal Assistance. To pull dress down  Footwear Management: Dependent. [place]/donning slipper socks  Toileting: Maximum Assistance. For hygiene after toileting, CGA for standing balance. Toilet Transfer: Minimal Assistance. To/from [facility] within [address] [place] [address]  ** During BADL routine, pt noted to have open, bleeding, wound in suprapubic area under skin fold (different from the one to L that has been documented). YVETTE Desai notified upon completion of bathing routine. Darius Gordon BALANCE:  Sitting Balance:  Supervision. Standing Balance: Stand By Assistance. Within jose meliza, leans on forearm     BED MOBILITY:  Sit to Supine: Moderate Assistance, X 1, with head of bed raised, with rail, with verbal cues , with increased time for completion    Supine to Sit: Minimal Assistance with increased time  Scooting: Minimal Assistance to bring R hip to EOB while seated upright    TRANSFERS:  Sit to Stand:  Minimal Assistance, X 1, with Atha Hurl. Assistance initially  Stand to Sit: [employer], X 1, with Atha Hurl.        FUNCTIONAL MOBILITY:  Assistive Device: jose haider  Assist Level:  Dependent. Distance:  bed to bedside chair     ADDITIONAL ACTIVITIES:  Patient completed BUE strengthening exercises with skilled education on HEP: completed x10 reps x1 set with a moderate resistance band in all joints and all planes in order to improve UE strength and activity tolerance required for BADL routine and toilet / shower transfers. Patient tolerated good, requiring minimal rest breaks. Patient also required minimal verbal/visual cues for technique. ST: Reviewed. Immediate/Delayed Recall - Grocery List (Milk, Eggs, Apples, Steak, Toilet Paper, Tylenol)  ST completed review of STM strategies using the acronym WRAP--Write it down, Repeat it, Associate it, and Pictures it. Patient utilized write it down and repeat it to assist with recall of 6 unit grocery list.     Immediate Recall: 5/6 independently, 1/6 given min cues   Delayed Recall (10 minutes): 4/6 independently, 2/6 given min cues to utilize list     Sequencing 4 Step ADLs  5/8 independently, 2/8 given min cues, 1/8 given mod cues   *decreased reasoning and thought organization within task  *patient often stating, \"that's not the order I would do it\"; however, with steps listed, correct sequence evident with appropriate reasoning skills. Sustained/Divided Attention  Patient was prompted to say \"ping\" for red suit and \"pong\" for black suit. Trial 1: Patient independently identified \"ping\" or \"pong\" correctly 46/52 cards (x6 errors), and completed task in 1:46 minutes. Trial 2: Patient independently identified \"ping\" or \"pong\" correctly 48/52 cards (x4 errors), and completed task in 2:00 minutes. Review of Systems:  Review of Systems   Constitutional:  Positive for fatigue. Negative for appetite change, chills, diaphoresis and fever. HENT:  Negative for hearing loss, rhinorrhea, sneezing, sore throat and trouble swallowing. Eyes:  Negative for visual disturbance.    Respiratory:  Negative for cough, shortness of breath and wheezing. Cardiovascular:  Negative for chest pain and palpitations. Gastrointestinal:  Negative for abdominal pain, constipation, diarrhea, nausea and vomiting. Genitourinary:  Negative for dysuria. Musculoskeletal:  Positive for arthralgias (Bilateral hip, right groin, and right thigh), back pain (Right-sided lower back), gait problem and myalgias (Right thigh). Negative for neck pain. Skin:  Negative for rash. Neurological:  Positive for weakness (Bilateral lower extremities especially on the right side) and numbness (Bilateral feet). Negative for dizziness, tremors, seizures, speech difficulty, light-headedness and headaches. Psychiatric/Behavioral:  Negative for dysphoric mood, hallucinations and sleep disturbance. The patient is not nervous/anxious.          Objective:  /68   Pulse 95   Temp 97.9 °F (36.6 °C) (Oral)   Resp 20   Ht 5' 2\" (1.575 m)   Wt 204 lb 0.9 oz (92.6 kg)   LMP  (LMP Unknown)   SpO2 94%   BMI 37.32 kg/m²   Physical Exam   General:  well-developed, well nourished morbid obese  female ; in no acute distress ; appropriate affect & mood; sitting on reclining chair comfortably; receiving humidified oxygen supplement via trach collar; speaking in whispered manner due to presence of tracheostomy  Eyes: pupil equally round ; extra-ocular motion intact bilaterally  Head, Ear, Nose, Mouth & Throat : normocephalic ; no discharge from ears or nose ; no deformity ; no facial swelling ; oral mucosa pink  Neck :  supple ; presence of tracheostomy at anterior neck; no tenderness; mild muscle spasm at cervical paraspinal muscle and upper trapezius muscle  Cardiovascular : regular rate & rhythm ; normal S1 & S2 heart sound ; no murmur ; normal peripheral pulse at bilateral upper & lower extremities  Pulmonary : Breath sounds present at bilateral lung fields; no wheezing ; no rale; no crackle  Gastrointestinal : soft, protruded abdomen; no tenderness; normal bowel sound present    : Wen catheter in place draining yellowish urine  Back : Tenderness at the right lumbar region; no muscle spasm  Skin: no skin lesion or rash ; no pitting edema at bilateral upper extremities; 1+ to 2+ pitting edema at right leg; 1+ pitting edema at the left leg; presence of PICC line on right arm near elbow area  Musculoskeletal : no limb asymmetry; no limb deformity; tenderness at right lateral upper thigh, right lateral hip and right groin area; no tenderness at bilateral upper extremities & the rest of bilateral lower extremities; no palpable mass at limbs ; right hip and right knee joint laxity not assessed; no joints laxity or crepitation at other limb joints; shoulder flexion and abduction passive ROM reaching 160 degrees bilaterally; right hip flexion passive ROM reaching 50 degrees and right knee flexion passive ROM reaching 45 degrees limited by pain at right hip and thigh; left hip flexion passive ROM reaching 90 degrees; ankle dorsiflexion passive ROM reaching 0 degrees bilaterally; otherwise normal functional joints ROM at the rest of bilateral upper & lower extremities  Cerebral :  alert ; awake ; oriented to place, person and time; follow 1-2 steps verbal command  Cerebellum : no dysmetria with bilateral finger-to-nose test ; unable to perform heel-to-shin test on the right side; dysmetria with left heel-to-shin test  Cranial nerve : CN II to XII function grossly intact  Sensory : intact light touch and pin prick sensation at bilateral upper extremities; reduced light touch and pinprick sensation at distal bilateral lower extremities from upper leg region downward in sock distribution  Motor : normal tone at bilateral upper & left lower extremities ; muscle tone not assessed on right lower extremity due to the pain; 2-/5 to 2+/5 muscle strength at right hip flexion, abduction and adduction; 4+/5 muscle strength at the left hip flexion; 3+/5 muscle strength at right knee extension ; 3+/5 muscle strength at the right knee flexion; otherwise 5/5 muscle strength at the rest of bilateral upper and the lower extremities  Reflex : 1+ bilateral brachioradialis reflexes; 0 bilateral biceps and bilateral knees reflexes   Pathological Reflex :  No Roberta's sign ; no ankle clonus  Gait : Not assessed      Diagnostics:   Recent Results (from the past 24 hour(s))   POCT glucose    Collection Time: 02/20/23  7:39 AM   Result Value Ref Range    POC Glucose 92 70 - 108 mg/dl       Impression:  Right femoral intertrochanteric fracture with subtrochanteric extension due to fall requiring open reduction and internal fixation with cephalomedullary nail  Glottic stenosis requiring tracheostomy  Persistent chronic low back pain due to lumbar and lower thoracic spine spondylosis with degenerative facet arthropathy at the lower 3 lumbar levels, and acute L2-S1 endplate fractures  Right posterior tibial, left greater saphenous, left peroneal and left anterior tibial veins deep vein thrombosis, and right lower lobe pulmonary embolism  Acute anemia requiring blood transfusion  Urinary retention due to urethra obstruction requiring Wen cath placement   Candida albicans UTI  Lumbar spine osteoporosis  Gluteal region decubitus ulcer  Diabetes mellitus type 2 with poor blood glucose control and complicated by bilateral lower extremities diabetic polyneuropathy  Morbid obesity  Hypertension  Hyperlipidemia  History of lower back pain with history of \"lumbar stress fracture\"  History of COVID infection with pneumonia  History of mild cognitive impairment  History of coronary artery disease requiring coronary artery stenting  Grade 1 left ventricular diastolic dysfunction with preserved ejection fraction     The patient's condition remains stable. She continues complaining of significant pain at her right hip, right groin and right lower back region.   Her right lower extremity remains weak but the muscle strength is improving. She continues tolerating the intensive inpatient rehabilitation treatment. Her function is improving very slowly. Currently the patient is projected to be ready for discharge on 2/24/2023      Plan:  Continues intensive PT/OT/SLP/RT inpatient rehabilitation program at least 3 hours per day, 5 days per week in order to improve functional status prior to discharge. Family education and training will be completed. Equipment evaluations and recommendations will be completed as appropriate. Rehabilitation nursing continues to be involved for bowel, bladder, skin, and pain management. Nursing will also provide education and training to patient and family. Prophylaxis:  DVT: Patient on Xarelto, KENA stocking, intermittent pneumatic compression device.   GI: Colace, Enemeez enema, GlycoLax, Senokot, lactulose as needed, milk of magnesium as needed; add Movantik  Pain: Tylenol,  lidocaine patch, Voltaren gel as needed, tramadol as needed  Continue aspirin for coronary artery disease  Continue Lasix for lower extremities edema  Continue Lantus insulin, Humalog insulin, Humalog insulin coverage for diabetes mellitus  Continue Seroquel for anxiety and insomnia   Continue Xarelto for bilateral DVT and pulmonary embolism  Continue Toprol-XL for hypertension  Continue Crestor for hyperlipidemia  Continue melatonin, trazodone as needed for insomnia; increase trazodone dosage to 100 mg  Continue Protonix for gastric protection  Continue simethicone as needed for indigestion  Continue multivitamin and ferrous sulfate for anemia  Continue with Diflucan 200 mg daily for 2 weeks for Candida albicans yeast UTI  Nutrition: Continue current diet  Bladder: Monitoring signs or symptoms of UTI  Bowel: Monitoring signs or symptoms of constipation   and case management for coordination of care and discharge planning      Missed Therapy Time:  None      Jin Hodge MD

## 2023-02-20 NOTE — PLAN OF CARE
Problem: Chronic Conditions and Co-morbidities  Goal: Patient's chronic conditions and co-morbidity symptoms are monitored and maintained or improved  Outcome: Progressing     Problem: Discharge Planning  Goal: Discharge to home or other facility with appropriate resources  Outcome: Progressing  Flowsheets (Taken 2/20/2023 0358)  Discharge to home or other facility with appropriate resources: Identify barriers to discharge with patient and caregiver     Problem: Discharge Planning  Goal: Discharge to home or other facility with appropriate resources  Outcome: Progressing  Flowsheets (Taken 2/20/2023 0358)  Discharge to home or other facility with appropriate resources: Identify barriers to discharge with patient and caregiver     Problem: Skin/Tissue Integrity  Goal: Absence of new skin breakdown  Description: 1. Monitor for areas of redness and/or skin breakdown  2. Assess vascular access sites hourly  3. Every 4-6 hours minimum:  Change oxygen saturation probe site  4. Every 4-6 hours:  If on nasal continuous positive airway pressure, respiratory therapy assess nares and determine need for appliance change or resting period.   Outcome: Not Progressing  Note: Buttocks macerated, zinc cream applied along with micotin powder to left and right buttocks, and turning when in bed     Problem: ABCDS Injury Assessment  Goal: Absence of physical injury  Outcome: Progressing  Flowsheets (Taken 2/20/2023 0358)  Absence of Physical Injury: Implement safety measures based on patient assessment     Problem: Safety - Adult  Goal: Free from fall injury  Outcome: Progressing  Flowsheets (Taken 2/20/2023 0358)  Free From Fall Injury: Instruct family/caregiver on patient safety     Problem: Pain  Goal: Verbalizes/displays adequate comfort level or baseline comfort level  Outcome: Progressing  Flowsheets (Taken 2/18/2023 2130 by Jennifer Corrales RN)  Verbalizes/displays adequate comfort level or baseline comfort level:   Encourage patient to monitor pain and request assistance   Assess pain using appropriate pain scale   Administer analgesics based on type and severity of pain and evaluate response   Implement non-pharmacological measures as appropriate and evaluate response   Consider cultural and social influences on pain and pain management   Notify Licensed Independent Practitioner if interventions unsuccessful or patient reports new pain     Problem: Nutrition Deficit:  Goal: Optimize nutritional status  Outcome: Progressing  Flowsheets (Taken 2/20/2023 0358)  Nutrient intake appropriate for improving, restoring, or maintaining nutritional needs:   Assess nutritional status and recommend course of action   Monitor oral intake, labs, and treatment plans     Problem: Metabolic/Fluid and Electrolytes - Adult  Goal: Electrolytes maintained within normal limits  Outcome: Progressing  Flowsheets (Taken 2/20/2023 0358)  Electrolytes maintained within normal limits: Monitor labs and assess patient for signs and symptoms of electrolyte imbalances     Problem: Metabolic/Fluid and Electrolytes - Adult  Goal: Hemodynamic stability and optimal renal function maintained  Flowsheets (Taken 2/20/2023 0358)  Hemodynamic stability and optimal renal function maintained:   Monitor intake, output and patient weight   Monitor labs and assess for signs and symptoms of volume excess or deficit   Monitor urine specific gravity, serum osmolarity and serum sodium as indicated or ordered     Problem: Metabolic/Fluid and Electrolytes - Adult  Goal: Glucose maintained within prescribed range  Outcome: Progressing     Problem: Skin/Tissue Integrity  Goal: Absence of new skin breakdown  Description: 1. Monitor for areas of redness and/or skin breakdown  2. Assess vascular access sites hourly  3. Every 4-6 hours minimum:  Change oxygen saturation probe site  4.   Every 4-6 hours:  If on nasal continuous positive airway pressure, respiratory therapy assess nares and determine need for appliance change or resting period.   Outcome: Not Progressing  Note: Buttocks macerated, zinc cream applied along with micotin powder to left and right buttocks, and turning when in bed

## 2023-02-20 NOTE — PROGRESS NOTES
[facility] PHYSICAL THERAPY  DAILY NOTE  [address] [facility],4[address] - [id number]    Time In: 2970  Time Out: 1502  Timed Code Treatment Minutes: 60 Minutes  Minutes: 60          Date: 2023  Patient Name: Kimberly Wiggisn,  Gender:  female        MRN: [id number]  : 1950  (81 y.o.)     Referring Practitioner: Radha Lambert MD  Diagnosis: closed intertrochanteric fx, Rt  Additional Pertinent Hx: Paula Sunshine is a 73yoF with PMH of recent PE and b/l DVT, macrocytic anemia, CAD, DM2, tracheostomy dependent, obesity, and HTN who presents for leg swelling, nausea, and constipation. She was recently discharged after an 8 day admission for DVTs and PEs. She was placed on Xarelto when discharged and stated that she has been compliant and only missed one dose. She returned due to chronic constipation, feeling ''bloated'', nausea, and LE edema. While being evaluated in the ED the pt had a mechanical fall and developed a R femur fracture. She was found to have supratheraputic INR at 3.83. CT head and C-spine both clear. Pt is s/p R femur ORIF by Dr Vamshi Michel . Pt found to have acute fractures of L2-S1 likely 2/2 osteoporosis, ok for activity as tolerated per Dr Anahi Jovel . Prior Level of Function:  Lives With: Spouse  Type of Home: House  Home Layout: One level  Home Access: Ramped entrance (or 1 step with no rails.)  Home Equipment: emil Mendez Lift chair (Pt sleeps in lift chair and was using it to assist up to stand PTA)   Bathroom Shower/Tub: Walk-in shower  Bathroom Toilet: Standard  Bathroom Equipment: Built-in shower seat, 3-in-1 commode    ADL Assistance: Needs assistance  Homemaking Assistance: Needs assistance  Ambulation Assistance: Independent  Transfer Assistance: Independent  Active : No  Additional Comments: pt amb short distances in the home with RW,  available / and able to assist as needed.  Has a passi valve that she uses at rest.    Restrictions/Precautions:  Restrictions/Precautions: General Precautions, Fall Risk, Weight Bearing  Right Lower Extremity Weight Bearing: Weight Bearing As Tolerated  Position Activity Restriction  Other position/activity restrictions: Tracheostomy/collar 6L. With venturi mask for out of room use 8 liters portable O2. SUBJECTIVE: Patient in room in recliner, agreeable to PT. Pt cooperative and pleasant, ready to trial ambulation and standing with walker this session. PAIN: right hip/thigh, not rated    Vitals: Vitals not assessed per clinical judgement, see nursing flowsheet. Pt on 8L with venturi mask out of room, on 6L in room    OBJECTIVE:  Bed Mobility:  Sit to Supine: Moderate Assistance, X 2 --assist at trunk and LE's    Transfers: with [employer]   Sit to Stand: minimal assistance from recliner in room, moderate assist of 1 plus minimal assist of another from bedside commode  Stand to Sit:Minimal Assistance    Transfers: pulling from parallel bars  Sit to Stand: Minimal to Moderate Assistance  Stand to Sit: Minimal to Moderate Assistance  *Verbal cues for upright posture. Tactile cues at glutes/low back for trunk extension for upright posture    Transfers: pushing up from wheelchair to stand to RW  Sit to Stand: Moderate Assistance  Stand to Sit: Minimal to Moderate Assistance    Ambulation:  Minimal Assistance  Distance: 1 step forward and back in parallel bars, 1 step forward and back 2 times in parallel bars, a few steps wheelchair to bed with RW  Surface: Level Tile  Device:Parallel Bars, RW  Gait Deviations: Forward Flexed Posture, Slow Haylee, Decreased Step Length Bilaterally, Decreased Gait Speed, and Decreased Heel Strike Bilaterally  *Verbal cues for upright posture. Verbal cues for contraction for stability of right LE with weight shift to right LE.    *Verbal cues and demonstration for sequencing of LE's and RW to turn to bed from wheelchair.   Verbal cues to ensure LE's against bed before sitting down. *Verbal cues for breathing    Patient initially completed 1 step forward and one step back with each LE. Pt completed again, this time stepping forward with each foot and stepping back 2 times. Patient completed right forward step and back x5. Balance:  Static Standing Balance: Contact Guard Assistance, Minimal Assistance    Exercise:  Patient was guided in 1-2 set(s) 5-10 reps of exercise to both lower extremities. Seated: hamstring curls right LE on skate board 2 sets of 5 (completed to increase knee ROM), heel/toe raises, hip adduction with ball . Exercises were completed for increased independence with functional mobility. Functional Outcome Measures: Not completed       ASSESSMENT:  Assessment: Patient progressing toward established goals. Activity Tolerance:  Patient tolerance of  treatment: good. Pt fatigues quickly, but much more motivated this session     Equipment Recommendations:Equipment Needed: No  Other: Pt has RW, manual w/c and mechanical lift device  Discharge Recommendations: Continue to assess pending progress and Patient would benefit from continued PT at discharge  Plan: Current Treatment Recommendations: Strengthening, Balance training, Functional mobility training, Transfer training, Endurance training, Equipment evaluation, education, & procurement, Patient/Caregiver education & training, Safety education & training, Therapeutic activities, Home exercise program, Wheelchair mobility training, Gait training, Pain management  General Plan:  (5x/ wk 90 min)    Patient Education  Patient Education: Bed Mobility, Transfers, Gait,  - Patient Verbalized Understanding, - Patient Requires Continued Education    Goals:  Patient Goals : get around o my own without the RW  Short Term Goals  Time Frame for Short Term Goals: 1 wk  Short Term Goal 1: Pt will go from supine <->sit, mod +1 to get in/out of bed.   Short Term Goal 2: Pt will get up/down from bed, into JEANNETTE stedy device, +1 mod assist to progress to up to RW  Short Term Goal 3: Pt will  the JEANNETTE stedy device, +1 min assist x5 min, to progress to standing with RW  Short Term Goal 4: Pt will propel w/c >= 50 ft, tile surface, CGA for home mobility  Long Term Goals  Time Frame for Long Term Goals : 3 wks from evaluation. Long Term Goal 1: Pt to go supine <->sit, min +1 to get in/out of bed. Long Term Goal 2: Pt to get up/down from bed or w/c +1 min assist to get up to walk. Long Term Goal 3: Pt to perform stand/pivot transfer with RW, +1 min assist to get in/out of w/c. Long Term Goal 4: Pt to walk with RW >= 10 ft, mn +1 to progress to home mobility  Long Term Goal 5: Pt to propel w/c >= 50 ft, Mod I, for home mobility  Additional Goals?: Yes  Long term goal 6: Pt to get in/out of car, min +1 for transportation needs. Following session, patient left in safe position with all fall risk precautions in place.

## 2023-02-20 NOTE — PROGRESS NOTES
Physical Medicine & Rehabilitation Progress Note    Chief Complaint:  Right hip fracture, low back compression fracture    Subjective:    Irma Ricks is a 81 y.o. 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 The patient previously was admitted to inpatient rehab service from 2022 to 2022 due to critical care myopathy and debility/physical decondition as result of COVID-19 pneumonia. She was discharged to home with referral for home care service on 2022. The patient developed progressive difficulty breathing in 2022 and was found to have glottic stenosis and eventually received tracheostomy by Dr. Carlos Brewster on 2022. The patient's  says the patient also developed progressively worsening lower back pain in summer 2022 and saw orthopedics surgeon at [facility]. The patient has been says the patient was diagnosed of having \"lumbar spine stress fracture\". No surgical intervention was performed. She was treated with brace which she later abandoned due to discomfort associate with brace usage.      The patient was recently hospitalized from 2023 to 2023 initially for abdominal bloating, nausea and leg swelling. She was found to have elevated D-dimer. Subsequent testing revealed right lower lobe pulmonary embolism and right lower extremity DVT. She was at initially treated with IV heparin and later transition to [address] [place] [address]. The patient was brought to ER on 2/1/2022 because of progressively increased leg swelling, and nausea/vomiting associated with decreased oral intake. She was found to have BNP of 256.7 and Bumex was prescribed. She then had a fall accident  when she was going to toilet while she was in ER on 2/1/2022. The fall resulted severe right hip pain. X-ray of right hip and pelvis revealed acute right femoral intertrochanteric fracture with subtrochanteric extension. Orthopedic service was consulted. Xarelto was held in preparation for surgical management. Cardiology was consulted on 2/2/2023 for elevated BNP and Lasix was started. Because of difficulty voiding with urinary retention, urology was also consulted on 2/2/2023. Urology service placed Wen with some difficulty on 2/2/2023. Therefore Wen was kept in place. On 2/3/2023 the patient underwent open treatment of right intertrochanteric fracture with cephalomedullary nail by Dr. Rom Roman. She is allowed weightbearing as tolerated at the right lower extremity postoperatively. PM&R was consulted on 2/5/2023. During the evaluation the patient's  said that the patient began having progressively worsened low back pain starting in summer 2022 began interfering the patient's daily function. Therefore she is sore orthopedic physician at [facility]. Imaging tests were done and the patient was told that she had \" stress fracture\" in her spine. She was provided with a lumbar spine brace to wear but she was not compliant with spine brace application because of discomfort it produced while she was wearing it.   Because no outside lumbar spine images study report was available for review, x-ray of lumbar spine was ordered and performed on 2/6/2023 and showed moderate vertebral body spondylosis in the lumbar and lower thoracic spine, moderate degenerative facet arthropathy involving at least lower 3 lumbar levels, lumbar spine osteoporosis, mild superior endplate depression fracture at L2, L3 and L4. Lumbar spine MRI was recommended by radiologist.  Therefore primary team ordered lumbar spine MRI which was performed this afternoon revealing acute fracture with a fluid cleft associated with endplate at Z7-D5 levels with mild height loss at each vertebral bodies. The patient was found to have severe anemia with Hgb/Hct dropped from 7.3/24 on 2/4/2023 down to 5.1/16.9 on 2/6/2023. Therefore she was given blood transfusion for few units. CT of abdomen and pelvis was ordered to rule out possible intra-abdominal source of blood loss. CT abdomen and pelvis done on 2/8/2023 show only pericolonic inflammation with diverticula related to acute diverticulitis, acute on chronic compression fracture of L2, L3, L4 and L5, mild, and marked osteopenia. The patient's hospital course also complicated by intermittent confusion, and development of gluteus region skin breakdown. The patient says he did not feel well today. She feels nauseous which she was not relieved with Zofran usage. She also has poor appetite. She says she also has diarrhea. However she denies having abdominal pain, vomiting, chest pain, or shortness of breath. She denies having fever but did feel chill. She is still have pain at the right hip and groin but the pain intensity is not severe today. She feels more fatigue with generalized weakness today. She barely tolerated today's rehab therapy. The patient is projected be ready for discharge on 2/24/2023      Rehabilitation:  PT: Reviewed. Bed Mobility:  Sit to Supine:  Moderate Assistance --with bed rail, head of bed flat  *Moderate assistance to position in bed     Transfers:  Sit to Stand: Minimal Assistance, X 2 to RW--verbal cues for hand placement  Stand to Sit:Minimal Assistance, X 2 from RW--verbal cues for hand placement  Stand Pivot:Minimal Assistance, with RW, taking 3-4 steps to complete. Min assist with RW maneuvering also. 2nd trial from commode to recliner:  dependent with use of JEANNETTE stedy due to pt fatigue and nausea  To/From Bed and Chair: Minimal Assistance, X 2-- a few steps with a RW, verbal cues for positioning of body before sitting and upright posture as pt tends to lean forearms on RW. Ambulation:  Minimal Assistance, X 2  Distance: a few steps chair <--> bedside commode or chair  -->bed  Surface: Level Tile  Device:Rolling Walker  Gait Deviations: Forward Flexed Posture, Slow Haylee, Decreased Step Length Bilaterally, Decreased Weight Shift Right, Decreased Gait Speed, and Decreased Heel Strike Bilaterally     Wheelchair Mobility:  Supervision  Extremities Used: Bilateral Upper Extremities  Type of Chair:Manual  Surface: Level Tile  Distance: 76' with 2 rest breaks  Quality: Slow Velocity, Short Strokes, and Decreased Fluidity      Balance:  Static Standing Balance: Minimal Assistance  With RW support for pericare, donning pull ups. Pt stood approx 1 min x2 trials       OT: Reviewed. ADL:   Grooming: with set-up. While seated in bedside chair to engage in oral care and hair combing task . Bathing: Maximum Assistance. A for washing distal BLE while seated on BSC and recliner. A for flaquito care and catheter care. A to wash bottom in standing with CGA for balance after incontinent of bowel. Upper Extremity Dressing: with set-up. For donning pullover shirt while seated in recliner  Lower Extremity Dressing: Maximum Assistance. A to thread BLE into depends while seated. Max A to manage around hips in standing with Min A x2 for balance at RW. Footwear Management: Dependent. For donning/doffing slipper socks. Toileting: Maximum Assistance.   A for flaquito care and cleaning bottom after incontinent of bowel. Toilet Transfer: Minimal Assistance. To/from [facility] with Malachi Solares . BALANCE:  Sitting Balance:  Supervision. Standing Balance: Minimal Assistance, X 2. At [facility] CGA x1 within Malachi Solares. Pt stood x1min trials. TRANSFERS:  Sit to Stand:  Contact Guard Assistance. Stand to Sit: Contact Guard Assistance. Pt. Stood up to 30 seconds before requiring rest break. FUNCTIONAL MOBILITY:  Assistive Device: Rolling Walker  Assist Level:  Minimal Assistance and X 2. Distance:  short distance forward and back  Tendencies to demo short shuffling steps. ADDITIONAL ACTIVITIES:  Patient completed BUE strengthening exercises with skilled education on HEP: completed x10 reps x1 set with a light resistance band in all joints and all planes in order to improve UE strength and activity tolerance required for BADL routine and toilet / shower transfers. Patient tolerated well, requiring min rest breaks. Patient also required min cues for technique. Pt. Engaged in core strengthening task while addressing simultaneous movement in BUE by instructing pt to lean forward in chair to reduce back support to complete 20 reps of table top slides laterally, to/from midline, circular directions, v shape, diagonally pt required min vc for proper technique and to slow pace to ensure full ROM/performance achieved, task performed to improve I with seated ADL routines. Pt. Required 3 vc throughout to reposition trunk to activate core musculature as well. ST: Reviewed.     Time Management - [facility]  3/4 independent, 1/4 given min cue     *patient with decreased attention to detail; required min cuing to reread question; good success after cuing     Novel Card Game - Trash  Patient with decreased working memory; required min cuing initially for how to play game; increased success on execution of game play      [employer]  1/5 independent, 4/5 given min-mod cues     *patient with decreased visual scanning and attention to detail; required cuing to locate correct ticket prices  *patient with good mental math computations for addition problems; utilized pen/paper for multiplication problem with good success     Time Management - [address] [place] [address]  2/2 given min-mod cues     *patient with decreased visual scanning; required min cuing to locate correct times   *patient with decreased math computations of time variables; utilized pen/paper for math computation; however, required mod cuing to solve math computation of time variables  *task was not finished on this date; ST will complete on subsequent date     SHORT TERM GOAL #2:  Goal 2: Patient will complete functional immediate, working, and delayed recall tasks of 4+ units of information, with or without use of trained recall strategies, with 80% accuracy given min cues to improve retention of pertinent medical information. - GOAL MET; REVISE  REVISED: Goal 2: Patient will complete functional immediate, working, and delayed recall tasks of 4+ units of information, with or without use of trained recall strategies, with 85% accuracy given min cues to improve retention of pertinent medical information  INTERVENTIONS:   Recall Novel Card Game - Trash  (Name, setup, how to play, how to win)  Immediate recall: 4/4 independent  Delayed (20 min): 4/4 independent     PRIOR SESSION  Delayed Recall - Grocery List   (Milk, Eggs, Apples, Steak, Toilet Paper, Tylenol)  Delayed Recall (~3 days): 4/6 independently, 1/6 given min cues, 1/6 with utilization of list      *patient with recall of writing list on notepad; however, required total assist to locate list      [address] #3:  Goal 3: Patient will complete functional thought organization and sequencing exercises with 85% accuracy and min cuing  in order to improve  completion  of  ADLs/IADLs. - GOAL MET; REVISE  REVISED: Goal 3: Patient will complete functional thought organization and sequencing exercises with 90% accuracy and min cuing  in order to improve  completion  of  ADLs/IADLs. INTERVENTIONS: See goal #4 for further information on sequencing     PRIOR SESSION  Sequencing 4 Step ADLs  5/8 independently, 2/8 given min cues, 1/8 given mod cues   *decreased reasoning and thought organization within task  *patient often stating, \"that's not the order I would do it\"; however, with steps listed, correct sequence evident with appropriate reasoning skills. SHORT TERM GOAL #4:  Goal 4: Patient will complete sustained, selective, alternating, and divided attention tasks with no more than three  errors/redirections in five minutes/one task in order to allow for safe return to driving at discharge. - GOAL MET; CONTINUE FOR CONSISTENCY  INTERVENTIONS:   Up & Down  ST provided patient with farhad and club cards 2-10. Patient instructed to place farhad cards in ascending order (2 through 10) and club cards in descending order (10 through 2) and alternate suit/color. Trial 1: patient with x4 errors in 2:34; ST provided cuing to check pattern for errors; patient independently ID all errors     *patient utilized strategy of sorting cards by suit/color in ascending order to aid in sequencing     Trial 2: patient with x1 self correction in 3:27; ST instructed patient to leave cards scrambled to complete trial     *patient with good visual scanning this trial     PRIOR SESSION  Divided Attention - Cross out [employer]  Patient with x1 error in 3:24     *patient independently ID error when given  min cue to scan for missed letter      Review of Systems:  Review of Systems   Constitutional:  Positive for appetite change, chills and fatigue. Negative for diaphoresis and fever. HENT:  Negative for hearing loss, rhinorrhea, sneezing, sore throat and trouble swallowing. Eyes:  Negative for visual disturbance.    Respiratory:  Negative for cough, shortness of breath and wheezing. Cardiovascular:  Negative for chest pain and palpitations. Gastrointestinal:  Positive for diarrhea and nausea. Negative for abdominal pain, constipation and vomiting. Genitourinary:  Negative for dysuria. Musculoskeletal:  Positive for arthralgias (Bilateral hip, right groin, and right thigh), back pain (Right-sided lower back), gait problem and myalgias (Right thigh). Negative for neck pain. Skin:  Negative for rash. Neurological:  Positive for weakness (Bilateral lower extremities especially on the right side) and numbness (Bilateral feet). Negative for dizziness, tremors, seizures, speech difficulty, light-headedness and headaches. Psychiatric/Behavioral:  Negative for dysphoric mood, hallucinations and sleep disturbance. The patient is not nervous/anxious.          Objective:  /68   Pulse 95   Temp 97.9 °F (36.6 °C) (Oral)   Resp 20   Ht 5' 2\" (1.575 m)   Wt 204 lb 0.9 oz (92.6 kg)   LMP  (LMP Unknown)   SpO2 94%   BMI 37.32 kg/m²   Physical Exam   General:  well-developed, well nourished morbid obese  female ; in no acute distress ; appropriate affect & mood; sitting on reclining chair comfortably; receiving humidified oxygen supplement via trach collar; speaking in whispered manner due to presence of tracheostomy  Eyes: pupil equally round ; extra-ocular motion intact bilaterally  Head, Ear, Nose, Mouth & Throat : normocephalic ; no discharge from ears or nose ; no deformity ; no facial swelling ; oral mucosa pink  Neck :  supple ; presence of tracheostomy at anterior neck; no tenderness; mild muscle spasm at upper trapezius muscle  Cardiovascular : regular rate & rhythm ; normal S1 & S2 heart sound ; no murmur ; normal peripheral pulse at bilateral upper & lower extremities  Pulmonary : Breath sounds present at bilateral lung fields; no wheezing ; no rale; no crackle  Gastrointestinal : soft, protruded abdomen; no tenderness; normal bowel sound present    : Wen catheter in place draining light yellowish urine  Back : no tenderness ; no muscle spasm  Skin: no skin lesion or rash ; no pitting edema at bilateral upper extremities; 2+ pitting edema at bilateral legs  Musculoskeletal : no limb asymmetry; no limb deformity; mild tenderness at right lateral upper thigh, right lateral hip; no tenderness at bilateral upper extremities & the rest of bilateral lower extremities; no palpable mass at limbs ; right hip and right knee joint laxity not assessed; no joints laxity or crepitation at other limb joints; shoulder flexion and abduction passive ROM reaching 160 degrees bilaterally; right hip flexion passive ROM reaching 70 degrees and right knee flexion passive ROM reaching 80 degrees limited by pain at right hip and thigh; left hip flexion passive ROM reaching 90 degrees; ankle dorsiflexion passive ROM reaching 0 degrees bilaterally; otherwise normal functional joints ROM at the rest of bilateral upper & lower extremities  Cerebral :  alert ; awake ; oriented to place, person and time; follow 1-2 steps verbal command  Cerebellum : no dysmetria with bilateral finger-to-nose test ; unable to perform heel-to-shin test on the right side; dysmetria with left heel-to-shin test  Cranial nerve : CN II to XII function grossly intact  Sensory : intact light touch and pin prick sensation at bilateral upper extremities; reduced light touch and pinprick sensation at distal bilateral lower extremities from upper leg region downward in sock distribution  Motor : normal tone at bilateral upper & left lower extremities ; muscle tone not assessed on right lower extremity due to the pain; 2-/5 to 2+/5 muscle strength at right hip flexion, abduction and adduction; 4-/5 to 4+/5 muscle strength at the left hip flexion; 3+/5 to 4-/5 muscle strength at right knee extension ; 3+/5 muscle strength at the right knee flexion; otherwise 5/5 muscle strength at the rest of bilateral upper and the lower extremities  Reflex : 1+ bilateral brachioradialis reflexes; 0 bilateral biceps and bilateral knees reflexes   Pathological Reflex :  No Roberta's sign ; no ankle clonus  Gait : Not assessed      Diagnostics:   Recent Results (from the past 24 hour(s))   POCT glucose    Collection Time: 02/20/23  7:39 AM   Result Value Ref Range    POC Glucose 92 70 - 108 mg/dl       Impression:  Right femoral intertrochanteric fracture with subtrochanteric extension due to fall requiring open reduction and internal fixation with cephalomedullary nail  Glottic stenosis requiring tracheostomy  Persistent chronic low back pain due to lumbar and lower thoracic spine spondylosis with degenerative facet arthropathy at the lower 3 lumbar levels, and acute L2-S1 endplate fractures  Right posterior tibial, left greater saphenous, left peroneal and left anterior tibial veins deep vein thrombosis, and right lower lobe pulmonary embolism  Decreased appetite, nausea and diarrhea most likely due to viral gastroenteritis  Acute anemia requiring blood transfusion  Urinary retention due to urethra obstruction requiring Wen cath placement   Candida albicans UTI  Lumbar spine osteoporosis  Gluteal region decubitus ulcer  Diabetes mellitus type 2 with poor blood glucose control and complicated by bilateral lower extremities diabetic polyneuropathy  Morbid obesity  Hypertension  Hyperlipidemia  History of lower back pain with history of \"lumbar stress fracture\"  History of COVID infection with pneumonia  History of mild cognitive impairment  History of coronary artery disease requiring coronary artery stenting  Grade 1 left ventricular diastolic dysfunction with preserved ejection fraction     The patient today is not feeling well due to nausea, poor appetite, diarrhea and fatigue. It is likely due to viral gastroenteritis. I encouraged the patient to increase oral fluid intake. We will check CBC and BMP tomorrow morning.   I will start patient on probiotic. Imodium as needed is also prescribed for diarrhea. Her right hip pain symptoms appear to be well controlled at the present time. She is able to achieve more pain-free ROM at right hip and right knee than before. She continues tolerating the intensive inpatient rehabilitation treatment but apparently today. Her function has been improving very slowly. Currently the patient is projected to be ready for discharge on 2/24/2023      Plan:  Continues intensive PT/OT/SLP/RT inpatient rehabilitation program at least 3 hours per day, 5 days per week in order to improve functional status prior to discharge. Family education and training will be completed. Equipment evaluations and recommendations will be completed as appropriate. Rehabilitation nursing continues to be involved for bowel, bladder, skin, and pain management. Nursing will also provide education and training to patient and family. Prophylaxis:  DVT: Patient on Xarelto, KENA stocking, intermittent pneumatic compression device.   GI: Colace, Enemeez enema, GlycoLax, Senokot, Movantik, lactulose as needed, milk of magnesium as needed; add lactobacillus daily  Add Imodium as needed for diarrhea  Pain: Tylenol,  lidocaine patch, Voltaren gel as needed, tramadol as needed  CBC with differential, BMP, procalcitonin and lactic acid tomorrow morning  Continue aspirin for coronary artery disease  Continue Lasix for lower extremities edema  Continue Lantus insulin, Humalog insulin, Humalog insulin coverage for diabetes mellitus  Continue Seroquel for anxiety and insomnia   Continue Xarelto for bilateral DVT and pulmonary embolism  Continue Toprol-XL for hypertension  Continue Crestor for hyperlipidemia  Continue melatonin, trazodone as needed for insomnia  Continue Protonix for gastric protection  Continue simethicone as needed for indigestion  Continue multivitamin and ferrous sulfate for anemia  Continue with Diflucan 200 mg daily for 2 weeks for Candida albicans yeast UTI  Nutrition: Continue current diet  Bladder: Monitoring signs or symptoms of UTI  Bowel: Monitoring signs or symptoms of constipation   and case management for coordination of care and discharge planning      Missed Therapy Time:  None      Zachary Mosquera MD

## 2023-02-21 LAB — GLUCOSE BLD STRIP.AUTO-MCNC: 99 MG/DL (ref 70–108)

## 2023-02-21 PROCEDURE — 97110 THERAPEUTIC EXERCISES: CPT

## 2023-02-21 PROCEDURE — 94760 N-INVAS EAR/PLS OXIMETRY 1: CPT

## 2023-02-21 PROCEDURE — 97535 SELF CARE MNGMENT TRAINING: CPT

## 2023-02-21 PROCEDURE — 97530 THERAPEUTIC ACTIVITIES: CPT

## 2023-02-21 PROCEDURE — 1180000000 HC REHAB R&B

## 2023-02-21 PROCEDURE — 6370000000 HC RX 637 (ALT 250 FOR IP): Performed by: PHYSICAL MEDICINE & REHABILITATION

## 2023-02-21 PROCEDURE — 97130 THER IVNTJ EA ADDL 15 MIN: CPT

## 2023-02-21 PROCEDURE — 99232 SBSQ HOSP IP/OBS MODERATE 35: CPT | Performed by: PHYSICAL MEDICINE & REHABILITATION

## 2023-02-21 PROCEDURE — 97129 THER IVNTJ 1ST 15 MIN: CPT

## 2023-02-21 PROCEDURE — 2580000003 HC RX 258: Performed by: PHYSICAL MEDICINE & REHABILITATION

## 2023-02-21 PROCEDURE — 6370000000 HC RX 637 (ALT 250 FOR IP): Performed by: FAMILY MEDICINE

## 2023-02-21 PROCEDURE — 82948 REAGENT STRIP/BLOOD GLUCOSE: CPT

## 2023-02-21 PROCEDURE — 2700000000 HC OXYGEN THERAPY PER DAY

## 2023-02-21 RX ORDER — LOPERAMIDE HYDROCHLORIDE 2 MG/1
2 CAPSULE ORAL 4 TIMES DAILY PRN
Status: DISCONTINUED | OUTPATIENT
Start: 2023-02-21 | End: 2023-03-02 | Stop reason: HOSPADM

## 2023-02-21 RX ORDER — FUROSEMIDE 40 MG/1
40 TABLET ORAL DAILY
Status: DISCONTINUED | OUTPATIENT
Start: 2023-02-22 | End: 2023-03-02 | Stop reason: HOSPADM

## 2023-02-21 RX ORDER — LACTOBACILLUS RHAMNOSUS GG 10B CELL
1 CAPSULE ORAL
Status: DISCONTINUED | OUTPATIENT
Start: 2023-02-21 | End: 2023-03-02 | Stop reason: HOSPADM

## 2023-02-21 RX ORDER — SODIUM CHLORIDE 9 MG/ML
INJECTION, SOLUTION INTRAVENOUS CONTINUOUS
Status: DISCONTINUED | OUTPATIENT
Start: 2023-02-21 | End: 2023-02-21

## 2023-02-21 RX ORDER — PROMETHAZINE HYDROCHLORIDE 25 MG/1
25 TABLET ORAL EVERY 6 HOURS PRN
Status: DISCONTINUED | OUTPATIENT
Start: 2023-02-21 | End: 2023-03-02 | Stop reason: HOSPADM

## 2023-02-21 RX ADMIN — FUROSEMIDE 20 MG: 20 TABLET ORAL at 10:10

## 2023-02-21 RX ADMIN — MICONAZOLE NITRATE: 20 POWDER TOPICAL at 12:16

## 2023-02-21 RX ADMIN — FOLIC ACID 1 MG: 1 TABLET ORAL at 10:10

## 2023-02-21 RX ADMIN — QUETIAPINE FUMARATE 25 MG: 25 TABLET ORAL at 21:15

## 2023-02-21 RX ADMIN — Medication 1 CAPSULE: at 21:15

## 2023-02-21 RX ADMIN — ONDANSETRON 4 MG: 4 TABLET, ORALLY DISINTEGRATING ORAL at 08:41

## 2023-02-21 RX ADMIN — DICLOFENAC SODIUM 2 G: 10 GEL TOPICAL at 12:14

## 2023-02-21 RX ADMIN — TRAZODONE HYDROCHLORIDE 100 MG: 100 TABLET ORAL at 21:15

## 2023-02-21 RX ADMIN — METOPROLOL SUCCINATE 25 MG: 25 TABLET, FILM COATED, EXTENDED RELEASE ORAL at 10:10

## 2023-02-21 RX ADMIN — ACETAMINOPHEN 650 MG: 325 TABLET ORAL at 21:15

## 2023-02-21 RX ADMIN — SODIUM CHLORIDE, PRESERVATIVE FREE 10 ML: 5 INJECTION INTRAVENOUS at 21:28

## 2023-02-21 RX ADMIN — RIVAROXABAN 20 MG: 20 TABLET, FILM COATED ORAL at 17:44

## 2023-02-21 RX ADMIN — FLUCONAZOLE 200 MG: 200 TABLET ORAL at 10:10

## 2023-02-21 RX ADMIN — DICLOFENAC SODIUM 2 G: 10 GEL TOPICAL at 21:28

## 2023-02-21 RX ADMIN — SODIUM CHLORIDE, PRESERVATIVE FREE 10 ML: 5 INJECTION INTRAVENOUS at 12:05

## 2023-02-21 RX ADMIN — ASPIRIN 81 MG 81 MG: 81 TABLET ORAL at 10:10

## 2023-02-21 RX ADMIN — PANTOPRAZOLE SODIUM 40 MG: 40 TABLET, DELAYED RELEASE ORAL at 06:16

## 2023-02-21 RX ADMIN — ONDANSETRON 4 MG: 4 TABLET, ORALLY DISINTEGRATING ORAL at 16:45

## 2023-02-21 RX ADMIN — ROSUVASTATIN 10 MG: 10 TABLET, FILM COATED ORAL at 21:15

## 2023-02-21 RX ADMIN — DICLOFENAC SODIUM 2 G: 10 GEL TOPICAL at 17:03

## 2023-02-21 RX ADMIN — Medication 6 MG: at 21:15

## 2023-02-21 RX ADMIN — PROMETHAZINE HYDROCHLORIDE 25 MG: 25 TABLET ORAL at 23:07

## 2023-02-21 RX ADMIN — MICONAZOLE NITRATE: 20 POWDER TOPICAL at 21:22

## 2023-02-21 RX ADMIN — ACETAMINOPHEN 650 MG: 325 TABLET ORAL at 15:34

## 2023-02-21 RX ADMIN — ACETAMINOPHEN 650 MG: 325 TABLET ORAL at 10:10

## 2023-02-21 RX ADMIN — Medication 1 TABLET: at 10:10

## 2023-02-21 RX ADMIN — INSULIN GLARGINE 26 UNITS: 100 INJECTION, SOLUTION SUBCUTANEOUS at 21:15

## 2023-02-21 ASSESSMENT — PAIN SCALES - WONG BAKER: WONGBAKER_NUMERICALRESPONSE: 0

## 2023-02-21 ASSESSMENT — PAIN SCALES - GENERAL
PAINLEVEL_OUTOF10: 7
PAINLEVEL_OUTOF10: 7
PAINLEVEL_OUTOF10: 0

## 2023-02-21 ASSESSMENT — ENCOUNTER SYMPTOMS
WHEEZING: 0
COUGH: 0
CONSTIPATION: 0
BACK PAIN: 1
TROUBLE SWALLOWING: 0
SHORTNESS OF BREATH: 0
SORE THROAT: 0
RHINORRHEA: 0
DIARRHEA: 1
VOMITING: 0
ABDOMINAL PAIN: 0
NAUSEA: 1

## 2023-02-21 ASSESSMENT — PAIN DESCRIPTION - ORIENTATION
ORIENTATION: RIGHT
ORIENTATION: RIGHT

## 2023-02-21 ASSESSMENT — PAIN DESCRIPTION - LOCATION
LOCATION: HIP
LOCATION: HIP

## 2023-02-21 ASSESSMENT — PAIN DESCRIPTION - DESCRIPTORS
DESCRIPTORS: ACHING
DESCRIPTORS: ACHING

## 2023-02-21 NOTE — PROGRESS NOTES
[facility]  INPATIENT PHYSICAL THERAPY  Progress Note  [address]    Time In:   Time Out: 1505  Timed Code Treatment Minutes: 60 Minutes  Minutes: 60          Date: 2023  Patient Name: Clint Johnson,  Gender:  female        MRN: [id number]  : 1950  (81 y.o.)     Referring Practitioner: Naomi Ortiz MD  Diagnosis: closed intertrochanteric fx, Rt  Additional Pertinent Hx: Jean-Pierre Pires is a 73yoF with PMH of recent PE and b/l DVT, macrocytic anemia, CAD, DM2, tracheostomy dependent, obesity, and HTN who presents for leg swelling, nausea, and constipation. She was recently discharged after an 8 day admission for DVTs and PEs. She was placed on Xarelto when discharged and stated that she has been compliant and only missed one dose. She returned due to chronic constipation, feeling ''bloated'', nausea, and LE edema. While being evaluated in the ED the pt had a mechanical fall and developed a R femur fracture. She was found to have supratheraputic INR at 3.83. CT head and C-spine both clear. Pt is s/p R femur ORIF by Dr Meron Saldivar . Pt found to have acute fractures of L2-S1 likely 2/2 osteoporosis, ok for activity as tolerated per Dr Werner Berry . Prior Level of Function:  Lives With: Spouse  Type of Home: House  Home Layout: One level  Home Access: Ramped entrance (or 1 step with no rails.)  Home Equipment: Arliss Custard, rolling, Lift chair (Pt sleeps in lift chair and was using it to assist up to stand PTA)    Vitals: Vitals not assessed per clinical judgement, see nursing flowsheet  Pt on 6L in room, 8L out of room on venturi    Restrictions/Precautions:  Restrictions/Precautions: General Precautions, Fall Risk, Weight Bearing  Right Lower Extremity Weight Bearing: Weight Bearing As Tolerated  Position Activity Restriction  Other position/activity restrictions: Tracheostomy/collar 6L.   With venturi mask for out of room use 8 liters portable O2. SUBJECTIVE: Patient in room in recliner, agreeable to PT. Pt reporting nausea and diarrhea this date. Pt notes difficulty eating. Pt positive small bowel movement in bedside commode this session. Pt with nausea in therapy gym, dry heaving, therefore assisted back to room. Dry heaving discontinued in room. PAIN: right hip/thigh. Pt noting decreased pain with right knee flexion this date    OBJECTIVE:  Bed Mobility:  Sit to Supine: Moderate Assistance --with bed rail, head of bed flat  *Moderate assistance to position in bed    Transfers:  Sit to Stand: Minimal Assistance, X 2 to RW--verbal cues for hand placement  Stand to Sit:Minimal Assistance, X 2 from RW--verbal cues for hand placement  To/From Bed and Chair: Minimal Assistance, X 2-- a few steps with a RW, verbal cues for positioning of body before sitting and upright posture as pt tends to lean forearms on RW. Ambulation:  Minimal Assistance, X 2  Distance: a few steps chair <--> bedside commode or chair  -->bed  Surface: Level Tile  Device:Rolling Walker  Gait Deviations: Forward Flexed Posture, Slow Haylee, Decreased Step Length Bilaterally, Decreased Weight Shift Right, Decreased Gait Speed, and Decreased Heel Strike Bilaterally    Wheelchair Mobility:  Supervision  Extremities Used: Bilateral Upper Extremities  Type of Chair:Manual  Surface: Level Tile  Distance: 76' with 2 rest breaks  Quality: Slow Velocity, Short Strokes, and Decreased Fluidity     Exercise:  Patient was guided in 1 set(s) 10 reps of exercise to both lower extremities. Supine: glut sets 5\" hold, quad sets 5\" hold, ankle pumps, heel slides with active assist on right LE. Exercises were completed for increased independence with functional mobility. Functional Outcome Measures: Not completed       ASSESSMENT: Mrs Rosalio Harris is making gradual progress towards goals set for her in PT, meeting 2/4 STG's and 0/6 LTG's.   Over the last week, pt has progressed sit < > stand from min assist x2 with Lise Pavy to min assist x2 to a RW and now is able to initiate ambulation with a RW, but limited to a few steps to transfer surface to surface. Patient fatigues quickly and is limited also by right hip/thigh pain. Pt's standing ability is limited as well by weakness and fatigue and therefore requires frequent seated rest break and positive encouragement. Mrs Jhon James would benefit from continued skilled PT services to continue to improve her ability to complete functional mobility, reduce her risk for falls and allow patient to return to home safely. Assessment:   Activity Tolerance:  Patient tolerance of  treatment: fair. Limited by nausea and fatigue     Equipment Recommendations:Equipment Needed: No  Other: Pt has RW, manual w/c   Discharge Recommendations:  Discharge Recommendations: Continue to assess pending progress, Patient would benefit from continued therapy after discharge    Plan: Current Treatment Recommendations: Strengthening, Balance training, Functional mobility training, Transfer training, Endurance training, Equipment evaluation, education, & procurement, Patient/Caregiver education & training, Safety education & training, Therapeutic activities, Home exercise program, Wheelchair mobility training, Gait training, Pain management  General Plan:  (5x/ wk 90 min)    Patient Education  Patient Education: Bed Mobility, Transfers, Gait, Wheelchair Mobility, Verbal Exercise Instruction,  - Patient Verbalized Understanding, - Patient Requires Continued Education    Goals:  Patient Goals : get around o my own without the RW  Short Term Goals  Time Frame for Short Term Goals: 1 wk  Short Term Goal 1: Pt will go from supine <->sit, mod +1 to get in/out of bed. GOAL NOT MET, CONTINUE  Short Term Goal 2: Pt will get up/down from bed, into JEANNETTE stedy device, +1 mod assist to progress to up to RW.  GOAL MET, SEE LTG  Short Term Goal 3: Pt will  the JEANNETTE stedy device, +1 min assist x5 min, to progress to standing with RW. GOAL NOT MET, CONTINUE  Short Term Goal 4: Pt will propel w/c >= 50 ft, tile surface, CGA for home mobility. GOAL MET, SEE LTG  Long Term Goals  Time Frame for Long Term Goals : 3 wks from evaluation. Long Term Goal 1: Pt to go supine <->sit, min +1 to get in/out of bed. GOAL NOT MET, CONTINUE  Long Term Goal 2: Pt to get up/down from bed or w/c +1 min assist to get up to walk. GOAL NOT MET, CONTINUE  Long Term Goal 3: Pt to perform stand/pivot transfer with RW, +1 min assist to get in/out of w/c. GOAL NOT MET, CONTINUE  Long Term Goal 4: Pt to walk with RW >= 10 ft, mn +1 to progress to home mobility. GOAL NOT MET, CONTINUE  Long Term Goal 5: Pt to propel w/c >= 50 ft, Mod I, for home mobility. GOAL NOT MET, CONTINUE  Additional Goals?: Yes  Long term goal 6: Pt to get in/out of car, min +1 for transportation needs. GOAL NOT MET, CONTINUE    Revised Short-Term Goals:    Short Term Goals  Time Frame for Short Term Goals: 1 wk  Short Term Goal 1: Pt will go from supine <->sit, mod +1 to get in/out of bed. Short Term Goal 2: Pt will get up/down from bed, into JEANNETTE stedy device, +1 mod assist to progress to up to RW GOAL MET, SEE LTG  Short Term Goal 3: Pt will  the JEANNETTE stedy device, +1 min assist x5 min, to progress to standing with RW  Short Term Goal 4: Pt will propel w/c >= 50 ft, tile surface, CGA for home mobility. GOAL MET, SEE LTG   Revised Long-Term Goals  Long Term Goals  Time Frame for Long Term Goals : 3 wks from evaluation. Long Term Goal 1: Pt to go supine <->sit, min +1 to get in/out of bed. Long Term Goal 2: Pt to get up/down from bed or w/c +1 min assist to get up to walk. Long Term Goal 3: Pt to perform stand/pivot transfer with RW, +1 min assist to get in/out of w/c.   Long Term Goal 4: Pt to walk with RW >= 10 ft, mn +1 to progress to home mobility  Long Term Goal 5: Pt to propel w/c >= 50 ft, Mod I, for home mobility  Additional Goals?: Yes  Long term goal 6: Pt to get in/out of car, min +1 for transportation needs.

## 2023-02-21 NOTE — PROGRESS NOTES
[address]  Occupational Therapy  Daily Note  Time:    Time In: 1130  Time Out: 1200  Timed Code Treatment Minutes: 30 Minutes  Minutes: 30          Date: 2023  Patient Name: Kimberly Wiggins,   Gender: female      Room: [facility]55/055-A  MRN: [id number]  : 1950  (81 y.o.)  Referring Practitioner: Dee Espino MD  Diagnosis: closed intertrochanteric fracture, right  Additional Pertinent Hx: The patient was recently hospitalized from 2023 to 2023 initially for abdominal bloating, nausea and leg swelling. She was found to have elevated D-dimer. Subsequent testing revealed right lower lobe pulmonary embolism and right lower extremity DVT. She was at initially treated with IV heparin and later transition to [address] [place] [address]. The patient was brought to ER on 2022 because of progressively increased leg swelling, and nausea/vomiting associated with decreased oral intake. She was found to have BNP of 256.7 and Bumex was prescribed. She then had a fall accident  when she was going to toilet while she was in ER on 2022. The fall resulted severe right hip pain. X-ray of right hip and pelvis revealed acute right femoral intertrochanteric fracture with subtrochanteric extension. Orthopedic service was consulted. Xarelto was held in preparation for surgical management. Cardiology was consulted on 2023 for elevated BNP and Lasix was started. Because of difficulty voiding with urinary retention, urology was also consulted on 2023. Urology service placed Wen with some difficulty on 2023. Therefore Wen was kept in place. On 2/3/2023 the patient underwent open treatment of right intertrochanteric fracture with cephalomedullary nail by Dr. Johanna Nava. She is allowed weightbearing as tolerated at the right lower extremity postoperatively.     Restrictions/Precautions:  Restrictions/Precautions: General Precautions, Fall Risk, Weight Bearing  Right Lower Extremity Weight Bearing: Weight Bearing As Tolerated  Position Activity Restriction  Other position/activity restrictions: Tracheostomy/collar 6L. With venturi mask for out of room use 8 liters portable O2. SUBJECTIVE: Upon arrival pt seated up in bedside chair and agreeable to OT session. Pt. Does report she has had episodes of diarrhea this am.    PAIN: 6/10  R rib region    Vitals: Vitals not assessed per clinical judgement, see nursing flowsheet    COGNITION: Impaired Memory and Decreased Safety Awareness    ADL:   Grooming: with set-up. While seated in bedside chair to engage in oral care and hair combing task . BALANCE:  Sitting Balance:  Supervision. BED MOBILITY:  Not Tested    TRANSFERS:  Sit to Stand:  Contact Guard Assistance. Stand to Sit: Contact Guard Assistance. Pt. Stood up to 30 seconds before requiring rest break. ADDITIONAL ACTIVITIES:  Pt. Engaged in core strengthening task while addressing simutenous movelent in BUE by instructing pt to lean forward in chair to reduce back support to complete 20 reps of table top slides laterally, to/from midline, circular directions, v shape, diagonally pt required min vc for proper technique and to slow pace to ensure full ROM/performance achieved, task performed to improve I with seated ADL routines. Pt. Required 3 vc throughout to reposition trunk to activate core musculature as well. ASSESSMENT:  Assessment: Rebecca De is making slow steady progress towards therapy goals during stay on IPR thus far, meeting 3/5 STGs. She has made progression in UB dressing with ability to complete with set up while seated upright. She continues to require maxA for LB dressing/bathing tasks. Rebecca De continues to be dep for footwear mgmt. Rebecca De currently requires Mod - Max A for toileting hygiene, requiring multiple standing trials d/t fatigue and A with hygiene and clothing mgmt.  Rebecca De continues to use the [employer] at times; however, has progressed to completion of functional t/fs with Min A x2 and short disance functional mobility with RW and Min A x2. She continues to exhibit decreased strength, activity tolerance, increase of pain in RLE with movement, increased need for assist during BADL routine which limits her from meeting her goals. Min Esparza is typically independent with BADL routine with SBA from  for safety measures. She is currently using 6L via trach mask. Pt continues to require skilled OT intervention to improve strength, activity tolerance, safety awareness, ADL/IADL performance required for pt to return home at [facility]. Without skilled services patient is at risk for falls, decrease in overall function and increased caregiver burden. Activity Tolerance:  Patient tolerance of  treatment: Fair treatment tolerance and Limited by fatigue       Discharge Recommendations: Continue to assess pending progress and Patient would benefit from continued OT at discharge  Equipment Recommendations: Other: Monitor pending progress  Plan: Times Per Week: 5x per week for 90 minutes  Times Per Day: Once a day  Current Treatment Recommendations: Balance training, Functional mobility training, Endurance training, Self-Care / ADL, Safety education & training, Patient/Caregiver education & training, Strengthening    Patient Education  Patient Education: ADL's and Home Exercise Program    Goals  Short Term Goals  Time Frame for Short Term Goals: until discharge  Short Term Goal 1: Pt will tolerate 12-15 min EOB ADL task with S to improve trunk control and activity tolerance required for EOB ADLs.   Short Term Goal 2: Pt will complete sit-stand t/fs with Min A x 1 with minimal cues of technique to progress towards BSC t/fs  Short Term Goal 3: Pt will tolerate standing 3 min with CGA for increased ease of clothing management nad LB bathing routine  Short Term Goal 4: Pt will safely navigate short distances with no > than Min A x1 and min VC for safety to increase indep with ADLs  Long Term Goals  Time Frame for Long Term Goals : 2 weeks from IPR OT eval  Long Term Goal 1: Pt will complete sit-stand t/fs with SBA x 1 with minimal cues of technique to progress towards BSC t/fs  Long Term Goal 2: Pt will perform UB/LB dressing and bathing with Min A and minimal cues for ECT to improve functional independence. Following session, patient left in safe position with all fall risk precautions in place.

## 2023-02-21 NOTE — PROGRESS NOTES
[address] THER[facility]  [facility]  PROGRESS NOTE    TIME   SLP Individual Minutes  Time In: 2884  Time Out: 1100  Minutes: 30  Timed Code Treatment Minutes: 30 Minutes       Date: 2023  Patient Name: Viviana Moon      CSN: [id number]   : 1950  (81 y.o.)  Gender: female   Referring Physician:  Alisha Quiroz MD  Diagnosis: Closed intertrochanteric fracture, right,  initial encounter  Precautions: Fall risk  Current Diet: Regular and Thin Liquids   Swallowing Strategies: Standard Universal Swallow Precautions  Date of Last MBS/FEES: Not Applicable    Pain:   - Pain location: hip/lower back - RN aware      Subjective:  Patient sitting in recliner upon ST arrival. Agreeable to skilled ST services. Pleasant, cooperative, and engaged throughout. Short-Term Goals:  SHORT TERM GOAL #1:  Goal 1: Patient will complete functional problem solving and reasoning tasks with 80% accuracy and min cuing in order to improve completion of ADLs/IADLs at discharge.  - GOAL NOT MET; CONTINUE  INTERVENTIONS:   Time Management - [employer]  3/4 independent, 1/4 given min cue    *patient with decreased attention to detail; required min cuing to reread question; good success after cuing    Novel Card Game - Trash  Patient with decreased working memory; required min cuing initially for how to play game; increased success on execution of game play     [address] [facility]  1/5 independent, 4/5 given min-mod cues    *patient with decreased visual scanning and attention to detail; required cuing to locate correct ticket prices  *patient with good mental math computations for addition problems; utilized pen/paper for multiplication problem with good success    Time Management - [employer]  2/ given min-mod cues    *patient with decreased visual scanning; required min cuing to locate correct times   *patient with decreased math computations of time variables; utilized pen/paper for math computation; however, required mod cuing to solve math computation of time variables  *task was not finished on this date; ST will complete on subsequent date    SHORT TERM GOAL #2:  Goal 2: Patient will complete functional immediate, working, and delayed recall tasks of 4+ units of information, with or without use of trained recall strategies, with 80% accuracy given min cues to improve retention of pertinent medical information. - GOAL MET; REVISE  REVISED: Goal 2: Patient will complete functional immediate, working, and delayed recall tasks of 4+ units of information, with or without use of trained recall strategies, with 85% accuracy given min cues to improve retention of pertinent medical information  INTERVENTIONS:   Recall Novel Card Game - Trash  (Name, setup, how to play, how to win)  Immediate recall: 4/4 independent  Delayed (20 min): 4/4 independent    PRIOR SESSION  Delayed Recall - Grocery List   (Milk, Eggs, Apples, Steak, Toilet Paper, Tylenol)  Delayed Recall (~3 days): 4/6 independently, 1/6 given min cues, 1/6 with utilization of list     *patient with recall of writing list on notepad; however, required total assist to locate list     [address] #3:  Goal 3: Patient will complete functional thought organization and sequencing exercises with 85% accuracy and min cuing  in order to improve  completion  of  ADLs/IADLs. - GOAL MET; REVISE  REVISED: Goal 3: Patient will complete functional thought organization and sequencing exercises with 90% accuracy and min cuing  in order to improve  completion  of  ADLs/IADLs.   INTERVENTIONS: See goal #4 for further information on sequencing    PRIOR SESSION  Sequencing 4 Step ADLs  5/8 independently, 2/8 given min cues, 1/8 given mod cues   *decreased reasoning and thought organization within task  *patient often stating, \"that's not the order I would do it\"; however, with steps listed, correct sequence evident with appropriate reasoning skills. SHORT TERM GOAL #4:  Goal 4: Patient will complete sustained, selective, alternating, and divided attention tasks with no more than three  errors/redirections in five minutes/one task in order to allow for safe return to driving at discharge. - GOAL MET; CONTINUE FOR CONSISTENCY  INTERVENTIONS:   Up & Down  ST provided patient with farhad and club cards 2-10. Patient instructed to place farhad cards in ascending order (2 through 10) and club cards in descending order (10 through 2) and alternate suit/color. Trial 1: patient with x4 errors in 2:34; ST provided cuing to check pattern for errors; patient independently ID all errors    *patient utilized strategy of sorting cards by suit/color in ascending order to aid in sequencing    Trial 2: patient with x1 self correction in 3:27; ST instructed patient to leave cards scrambled to complete trial    *patient with good visual scanning this trial    PRIOR SESSION  Divided Attention - Cross out [employer]  Patient with x1 error in 3:24    *patient independently ID error when given  min cue to scan for missed letter    Long-Term Goals:  Time Frame for Long Term Goals: 3 weeks    LONG TERM GOAL #1:  Goal 1: Patient will improve cognitive  functioning  to a level of modified independent in order to allow for safe return to PLOF.  GOAL NOT MET       Comprehension: 5 - Patient understands basic needs (hungry/hot/pain)  Expression: 5 - Expresses basic ideas/needs only (hungry/hot/pain)  Social Interaction: 5 - Patient is appropriate with supervision/cues  Problem Solvin - Patient solves simple/routine tasks 75-90%+   Memory: 4 - Patient remembers 75-90%+ of the time    EDUCATION:  Learner: Patient  Education:  Reviewed ST goals and Plan of Care and Education Related to [employer] [facility]  Evaluation of Education: Verbalizes understanding, Demonstrates with assistance, and Needs further instruction    ASSESSMENT/PLAN:  Summary: Patient has met 3/4 STGs and 0/1 LTGs this reporting period. Patient has made gains in recall, math computations, thought organization, sequencing, and attention. Patient continues to demonstrate deficits in complex executive functioning and often requires cuing for visual scanning and attention to detail. Expressive and receptive language [facility]. Speech and voice WFL with utilization of PMV. Patient consuming a regular texture diet and thin liquids. Patient would benefit from continued skilled ST services to address aforementioned cognitive skills. At this time, patient would benefit from skilled ST services via OP upon discharge, clearance from physician and driving evaluation prior to return to driving, and supervision with IADLs. Activity Tolerance:  Patient tolerance of  treatment: good. Assessment/Plan: Patient progressing toward established goals. Continues to require skilled care of licensed speech pathologist to progress toward achievement of established goals and plan of care. Plan for Next Session: recall, money management, thought organization  Discharge Recommendations: Outpatient Speech Therapy vs [address] Valerio CARSON  Clinician

## 2023-02-21 NOTE — PLAN OF CARE
Problem: Chronic Conditions and Co-morbidities  Goal: Patient's chronic conditions and co-morbidity symptoms are monitored and maintained or improved  Outcome: Progressing  Flowsheets (Taken 2/21/2023 1425)  Care Plan - Patient's Chronic Conditions and Co-Morbidity Symptoms are Monitored and Maintained or Improved: Monitor and assess patient's chronic conditions and comorbid symptoms for stability, deterioration, or improvement  Note: Patient s cardiopulmonary status remains stable at this time. Problem: Discharge Planning  Goal: Discharge to home or other facility with appropriate resources  Outcome: Progressing  Flowsheets (Taken 2/21/2023 1425)  Discharge to home or other facility with appropriate resources:   Identify barriers to discharge with patient and caregiver   Identify discharge learning needs (meds, wound care, etc)  Note: Patients discharge date has not been determined at this time patient care conference is tomorrow. Problem: Skin/Tissue Integrity  Goal: Absence of new skin breakdown  Description: 1. Monitor for areas of redness and/or skin breakdown  2. Assess vascular access sites hourly  3. Every 4-6 hours minimum:  Change oxygen saturation probe site  4. Every 4-6 hours:  If on nasal continuous positive airway pressure, respiratory therapy assess nares and determine need for appliance change or resting period. Outcome: Progressing  Note: Patient remains free from any new skin breakdown at this time. Problem: Safety - Adult  Goal: Free from fall injury  2/21/2023 1425 by Dolores Haas RN  Outcome: Progressing  Flowsheets (Taken 2/21/2023 1425)  Free From Fall Injury: Instruct family/caregiver on patient safety  Note: Patient remains free from falls at this time.   2/21/2023 0326 by Ofe Merritt RN  Outcome: Progressing     Problem: Pain  Goal: Verbalizes/displays adequate comfort level or baseline comfort level  2/21/2023 1425 by Dolores Haas RN  Outcome: Progressing  Flowsheets (Taken 2/21/2023 1425)  Verbalizes/displays adequate comfort level or baseline comfort level:   Encourage patient to monitor pain and request assistance   Assess pain using appropriate pain scale  Note: Patients pain is controlled with Prn Tramadol.  2/21/2023 0326 by Stanford Soares RN  Outcome: Progressing  Flowsheets (Taken 2/20/2023 2201)  Verbalizes/displays adequate comfort level or baseline comfort level:   Encourage patient to monitor pain and request assistance   Assess pain using appropriate pain scale   Administer analgesics based on type and severity of pain and evaluate response     Problem: Nutrition Deficit:  Goal: Optimize nutritional status  Outcome: Progressing

## 2023-02-21 NOTE — PLAN OF CARE
Problem: Skin/Tissue Integrity  Goal: Absence of new skin breakdown  Description: 1. Monitor for areas of redness and/or skin breakdown  2.   Assess vascular access sites hourly     Outcome: Progressing     Problem: Safety - Adult  Goal: Free from fall injury  2/21/2023 0326 by Osman Foster RN  Outcome: Progressing    Problem: Pain  Goal: Verbalizes/displays adequate comfort level or baseline comfort level  2/21/2023 0326 by Osman Foster RN  Outcome: Progressing  Flowsheets (Taken 2/20/2023 2201)  Verbalizes/displays adequate comfort level or baseline comfort level:   Encourage patient to monitor pain and request assistance   Assess pain using appropriate pain scale   Administer analgesics based on type and severity of pain and evaluate response

## 2023-02-21 NOTE — PROGRESS NOTES
[address] [address]  Inpatient Rehabilitation  Occupational Therapy  Progress Note  Time:  Time In: 0830  Time Out: 0930  Timed Code Treatment Minutes: 60 Minutes  Minutes: 60          Date: 2023  Patient Name: Viviana Moon,   Gender: female      Room: [facility]55/055-A  MRN: [id number]  : 1950  (81 y.o.)  Referring Practitioner: Alisha Quiroz MD  Diagnosis: closed intertrochanteric fracture, right  Additional Pertinent Hx: The patient was recently hospitalized from 2023 to 2023 initially for abdominal bloating, nausea and leg swelling. She was found to have elevated D-dimer. Subsequent testing revealed right lower lobe pulmonary embolism and right lower extremity DVT. She was at initially treated with IV heparin and later transition to [address] [place] [address]. The patient was brought to ER on 2022 because of progressively increased leg swelling, and nausea/vomiting associated with decreased oral intake. She was found to have BNP of 256.7 and Bumex was prescribed. She then had a fall accident  when she was going to toilet while she was in ER on 2022. The fall resulted severe right hip pain. X-ray of right hip and pelvis revealed acute right femoral intertrochanteric fracture with subtrochanteric extension. Orthopedic service was consulted. Xarelto was held in preparation for surgical management. Cardiology was consulted on 2023 for elevated BNP and Lasix was started. Because of difficulty voiding with urinary retention, urology was also consulted on 2023. Urology service placed Wen with some difficulty on 2023. Therefore Wen was kept in place. On 2/3/2023 the patient underwent open treatment of right intertrochanteric fracture with cephalomedullary nail by Dr. Shahla Ortiz. She is allowed weightbearing as tolerated at the right lower extremity postoperatively.     Restrictions/Precautions:  Restrictions/Precautions: General Precautions, Fall Risk, Weight Bearing  Right Lower Extremity Weight Bearing: Weight Bearing As Tolerated  Position Activity Restriction  Other position/activity restrictions: Tracheostomy/collar 6L. With venturi mask for out of room use 8 liters portable O2. SUBJECTIVE: Pt was up on BSC upon arrival, agreeable to OT. Requiring A with clean up d/t incontinence of bowel. PAIN: 8/10: R LE and lower back. Vitals: Vitals not assessed per clinical judgement, see nursing flowsheet    COGNITION: Decreased Recall, Decreased Insight, Decreased Problem Solving, Decreased Safety Awareness, and Impulsive    ADL:   Grooming: with set-up. Washing face  Bathing: Maximum Assistance. A for washing distal BLE while seated on BSC and recliner. A for flaquito care and catheter care. A to wash bottom in standing with CGA for balance after incontinent of bowel. Upper Extremity Dressing: with set-up. For donning pullover shirt while seated in recliner  Lower Extremity Dressing: Maximum Assistance. A to thread BLE into depends while seated. Max A to manage around hips in standing with Min A x2 for balance at . Footwear Management: Dependent. For donning/doffing slipper socks. Toileting: Maximum Assistance. A for flaquito care and cleaning bottom after incontinent of bowel. Toilet Transfer: Minimal Assistance. To/from [facility] with [facility] . BALANCE:  Sitting Balance:  Stand By Assistance. Standing Balance: Minimal Assistance, X 2. At [facility];, CGA x1 within [facility]. Pt stood x1min trials. BED MOBILITY:  Not Tested    TRANSFERS:  Sit to Stand:  Minimal Assistance, X 2. From recliner, with RW for BUE support. VC for hand placement and technique. Tendencies to rely on forearms as quickly as able. CGA x1 within [facility]. Stand to Sit: Minimal Assistance, X 2. From RW to recliner, CGA x1 within jose haider    FUNCTIONAL MOBILITY:  Assistive Device: Rolling Walker  Assist Level:  Minimal Assistance and X 2.    Distance:  short distance forward and back  Tendencies to demo short shuffling steps. ADDITIONAL ACTIVITIES:  Patient completed BUE strengthening exercises with skilled education on HEP: completed x10 reps x1 set with a light resistance band in all joints and all planes in order to improve UE strength and activity tolerance required for BADL routine and toilet / shower transfers. Patient tolerated well, requiring min rest breaks. Patient also required min cues for technique. ASSESSMENT:  Activity Tolerance:  Patient tolerance of  treatment: Fair treatment tolerance, Need for increased rest breaks, and required encouragement       Assessment: Assessment: Yasmine Neal is making slow steady progress towards therapy goals during stay on IPR thus far, meeting 3/5 STGs. She has made progression in UB dressing with ability to complete with set up while seated upright. She continues to require maxA for LB dressing/bathing tasks. Yasmine Neal continues to be dep for footwear mgmt. Yasmine Neal currently requires Mod - Max A for toileting hygiene, requiring multiple standing trials d/t fatigue and A with hygiene and clothing mgmt. Yasmine Neal continues to use the Suellyn Combe at times; however, has progressed to completion of functional t/fs with Min A x2 and short disance functional mobility with RW and Min A x2. She continues to exhibit decreased strength, activity tolerance, increase of pain in RLE with movement, increased need for assist during BADL routine which limits her from meeting her goals. Yasmine Neal is typically independent with BADL routine with SBA from  for safety measures. She is currently using 6L via trach mask. Pt continues to require skilled OT intervention to improve strength, activity tolerance, safety awareness, ADL/IADL performance required for pt to return [facility] at [facility]. Without skilled services patient is at risk for falls, decrease in overall function and increased caregiver burden.   Discharge Recommendations: Continue to assess pending progress, Patient would benefit from continued therapy after discharge  Equipment Recommendations: Other: Monitor pending progress  Plan: Times Per Week: 5x per week for 90 minutes  Times Per Day: Once a day  Current Treatment Recommendations: Balance training, Functional mobility training, Endurance training, Self-Care / ADL, Safety education & training, Patient/Caregiver education & training, Strengthening    Patient Education  Patient Education: Plan of Care, ADL's, Energy Conservation, and Reviewed Prior Education    Goals  Short Term Goals  Time Frame for Short Term Goals: until discharge  Short Term Goal 1: Pt will tolerate 12-15 min EOB ADL task with S to improve trunk control and activity tolerance required for EOB ADLs. GOAL NOT MET, CONTINUE  Short Term Goal 2: Pt will complete sit-stand t/fs with mod A x 1 with minimal cues of technique to progress towards BSC t/fs. GOAL MET, REVISE  Short Term Goal 3: Pt will tolerate standing 1 min with min A for increased ease of clothing management nad LB bathing routine GOAL MET, REVISE  Short Term Goal 4: Pt will tolerate further assessment of functional t/fs by OTR when appropriate GOAL MET, REVISE  Long Term Goals  Time Frame for Long Term Goals : 2 weeks from IPR OT eval  Long Term Goal 1: Pt will complete sit-stand t/fs with SBA x 1 with minimal cues of technique to progress towards BSC t/fs GOAL NOT MET, CONTINUE  Long Term Goal 2: Pt will perform UB/LB dressing and bathing with Min A and minimal cues for ECT to improve functional independence. GOAL NOT MET, CONTINUE    Revised Short-Term Goals  Short Term Goals  Time Frame for Short Term Goals: until discharge  Short Term Goal 1: Pt will tolerate 12-15 min EOB ADL task with S to improve trunk control and activity tolerance required for EOB ADLs.   Short Term Goal 2: Pt will complete sit-stand t/fs with Min A x 1 with minimal cues of technique to progress towards BSC t/fs  Short Term Goal 3: Pt will tolerate standing 3 min with CGA for increased ease of clothing management nad LB bathing routine  Short Term Goal 4: Pt will safely navigate short distances with no > than Min A x1 and min VC for safety to increase indep with ADLs  Long Term Goals  Time Frame for Long Term Goals : 2 weeks from IPR OT eval  Long Term Goal 1: Pt will complete sit-stand t/fs with SBA x 1 with minimal cues of technique to progress towards BSC t/fs  Long Term Goal 2: Pt will perform UB/LB dressing and bathing with Min A and minimal cues for ECT to improve functional independence. Following session, patient left in safe position with all fall risk precautions in place.

## 2023-02-21 NOTE — PROGRESS NOTES
[address] [address]  INPATIENT PHYSICAL THERAPY  DAILY NOTE  [address] - 7E-55/055-A  Time In: 1392  Time Out: 8567  Timed Code Treatment Minutes: 39 Minutes  Minutes: 41          Date: 2023  Patient Name: Reyes Ruffing,  Gender:  female        MRN: [id number]  : 1950  (81 y.o.)     Referring Practitioner: Paz Goldberg MD  Diagnosis: closed intertrochanteric fx, Rt  Additional Pertinent Hx: Ludmila Viera is a 73yoF with PMH of recent PE and b/l DVT, macrocytic anemia, CAD, DM2, tracheostomy dependent, obesity, and HTN who presents for leg swelling, nausea, and constipation. She was recently discharged after an 8 day admission for DVTs and PEs. She was placed on Xarelto when discharged and stated that she has been compliant and only missed one dose. She returned due to chronic constipation, feeling ''bloated'', nausea, and LE edema. While being evaluated in the ED the pt had a mechanical fall and developed a R femur fracture. She was found to have supratheraputic INR at 3.83. CT head and C-spine both clear. Pt is s/p R femur ORIF by Dr Leeanna Trimble . Pt found to have acute fractures of L2-S1 likely 2/2 osteoporosis, ok for activity as tolerated per Dr Daisha Hendrix . Prior Level of Function:  Lives With: Spouse  Type of Home: House  Home Layout: One level  Home Access: Ramped entrance (or 1 step with no rails.)  Home Equipment: emil Syed, Lift chair (Pt sleeps in lift chair and was using it to assist up to stand PTA)   Bathroom Shower/Tub: Walk-in shower  Bathroom Toilet: Standard  Bathroom Equipment: Built-in shower seat, 3-in-1 commode    ADL Assistance: Needs assistance  Homemaking Assistance: Needs assistance  Ambulation Assistance: Independent  Transfer Assistance: Independent  Active : No  Additional Comments: pt amb short distances in the home with RW,  available  and able to assist as needed.  Has a passi valve that she uses at rest.    Restrictions/Precautions:  Restrictions/Precautions: General Precautions, Fall Risk, Weight Bearing  Right Lower Extremity Weight Bearing: Weight Bearing As Tolerated  Position Activity Restriction  Other position/activity restrictions: Tracheostomy/collar 6L. With venturi mask for out of room use 8 liters portable O2. SUBJECTIVE: Pt seated in recliner. Pleasant and cooperative. Though stated was nauseous. Episode of diarrhea with incontinence during session. PAIN: not rated/10: current with pain meds    Vitals: Vitals not assessed per clinical judgement, see nursing flowsheet    OBJECTIVE:  Bed Mobility:  Not Tested    Transfers:  Sit to Stand: Moderate Assistance  Stand to Sit:Minimal Assistance  Trial from recliner and 3 from commode. Stand Pivot:Minimal Assistance, with RW, taking 3-4 steps to complete. Min assist with RW maneuvering also. 2nd trial from commode to recliner:  dependent with use of JEANNETTE stedy due to pt fatigue and nausea        Balance:  Static Standing Balance: Minimal Assistance  With RW support for pericare, donning pull ups. Pt stood approx 1 min x2 trials     Exercise:   Exercises were completed for increased independence with functional mobility. Seated- margarita hip ADD isometrics, margarita long arc quads with min assist RLE, glute and quad sets with manual cues LLE for quad sets, margarita ankle pumps, ham curls with t-band resistance x10 reps ea. Functional Outcome Measures: Not completed       ASSESSMENT:  Assessment: Patient progressing toward established goals. Activity Tolerance:  Patient tolerance of  treatment: good.       Equipment Recommendations:Equipment Needed: No  Other: Pt has RW, manual w/c and mechanical lift device  Discharge Recommendations: Continue to assess pending progress and Patient would benefit from continued PT at discharge  Plan: Current Treatment Recommendations: Strengthening, Balance training, Functional mobility training, Transfer training, Endurance training, Equipment evaluation, education, & procurement, Patient/Caregiver education & training, Safety education & training, Therapeutic activities, Home exercise program, Wheelchair mobility training, Gait training, Pain management  General Plan:  (5x/ wk 90 min)    Patient Education  Patient Education: Home Exercise Program, Transfers,  - Patient Verbalized Understanding, - Patient Requires Continued Education    Goals:  Patient Goals : get around o my own without the RW  Short Term Goals  Time Frame for Short Term Goals: 1 wk  Short Term Goal 1: Pt will go from supine <->sit, mod +1 to get in/out of bed. Short Term Goal 2: Pt will get up/down from bed, into JEANNETTE stedy device, +1 mod assist to progress to up to RW  Short Term Goal 3: Pt will  the JEANNETTE stedy device, +1 min assist x5 min, to progress to standing with RW  Short Term Goal 4: Pt will propel w/c >= 50 ft, tile surface, CGA for home mobility  Long Term Goals  Time Frame for Long Term Goals : 3 wks from evaluation. Long Term Goal 1: Pt to go supine <->sit, min +1 to get in/out of bed. Long Term Goal 2: Pt to get up/down from bed or w/c +1 min assist to get up to walk. Long Term Goal 3: Pt to perform stand/pivot transfer with RW, +1 min assist to get in/out of w/c. Long Term Goal 4: Pt to walk with RW >= 10 ft, mn +1 to progress to home mobility  Long Term Goal 5: Pt to propel w/c >= 50 ft, Mod I, for home mobility  Additional Goals?: Yes  Long term goal 6: Pt to get in/out of car, min +1 for transportation needs. Following session, patient left in safe position with all fall risk precautions in place.

## 2023-02-21 NOTE — PROGRESS NOTES
Comprehensive Nutrition Assessment    Type and Reason for Visit:  Reassess    Nutrition Recommendations/Plan:   Diet and ONS (Glucerna TID) as ordered  Recommend continue MVI  Encouraged po, good nutrition at best efforts for healing      Malnutrition Assessment:  Malnutrition Status: At risk for malnutrition (Comment) (02/13/23 1215)    Context:  Acute Illness     Findings of the 6 clinical characteristics of malnutrition:  Energy Intake:  Mild decrease in energy intake (Comment)  Weight Loss:  Unable to assess (difficult to evaluate w/ edema)     Body Fat Loss:  No significant body fat loss     Muscle Mass Loss:  No significant muscle mass loss    Fluid Accumulation:  Unable to assess     Strength:  Not Performed    Nutrition Assessment:     Pt. With no improvement from nutritional standpoint AEB poor appetite continues however does report good acceptance of Glucerna supplement. At risk for further nutrition compromise r/t increased nutrient needs for wound healing, underlying medical condition (hx CAD, COVID, DM, DVT, HLD HTN, PE, trach 4/22). Nutrition Related Findings:    Pt. Report/Treatments/Miscellaneous: Patient reports continues with poor appetite and stomach upset at times due to meds, good acceptance of Glucerna  GI Status: loose BM today - note bowel meds held   Pertinent Labs: (2/16) Potassium 3.6, BUN 10, Creatinine 0.6, Glucose 94  Pertinent Meds: diflucan, folic acid, lasix, lantus, MVI    Wound Type: Deep Tissue Injury, Surgical Incision (2/3 Open treatment right intertrochanteric femur fracture the cephalomedullary nail; abdomen skin tear; buttocks DTI)       Current Nutrition Intake & Therapies:    Average Meal Intake: 1-25%, 26-50%  Average Supplements Intake: 26-50%, %  ADULT DIET; Regular; 4 carb choices (60 gm/meal);  Low Sodium (2 gm); 2000 ml  ADULT ORAL NUTRITION SUPPLEMENT; Breakfast, Dinner, Lunch; Diabetic Oral Supplement    Anthropometric Measures:  Height: 5' 2\" (157.5 cm)  Ideal Body Weight (IBW): 110 lbs (50 kg)    Admission Body Weight: 225 lb 1.6 oz (102.1 kg) (2/10 trace, +1 edema)  Current Body Weight: 222 lb 14.4 oz (101.1 kg) (2/21; trace, RLE +2 pitting),    Current BMI (kg/m2): 40.8  Usual Body Weight:  (per EMR: 4/13/22: 192# 2 oz, 1/21/23: 174# 14 oz, 1/26/23: 210# 12 oz)                       BMI Categories: Obese Class 3 (BMI 40.0 or greater)    Estimated Daily Nutrient Needs:  Energy Requirements Based On: Kcal/kg  Weight Used for Energy Requirements: Other (Comment) (105)  Energy (kcal/day): 8101-7587 kcals (12-15)  Weight Used for Protein Requirements: Ideal (50)  Protein (g/day):  grams (1.5-2)       Nutrition Diagnosis:   Increased nutrient needs related to increase demand for energy/nutrients as evidenced by wounds    Nutrition Interventions:   Food and/or Nutrient Delivery: Continue Current Diet, Continue Oral Nutrition Supplement  Nutrition Education/Counseling: Education initiated (2/21 Encouraged po, ONS intake at best efforts for healing.)  Coordination of Nutrition Care: Continue to monitor while inpatient       Goals:     Goals: PO intake 75% or greater, by next RD assessment       Nutrition Monitoring and Evaluation:      Food/Nutrient Intake Outcomes: Diet Advancement/Tolerance, Food and Nutrient Intake, Supplement Intake  Physical Signs/Symptoms Outcomes: Biochemical Data, Chewing or Swallowing, GI Status, Fluid Status or Edema, Nutrition Focused Physical Findings, Skin, Weight    Discharge Planning:     Too soon to determine     Solomon Daniels, RD, LD  Contact: [phone]

## 2023-02-22 LAB
ANION GAP SERPL CALC-SCNC: 8 MEQ/L (ref 8–16)
BASOPHILS ABSOLUTE: 0 THOU/MM3 (ref 0–0.1)
BASOPHILS NFR BLD AUTO: 0.5 %
BUN SERPL-MCNC: 5 MG/DL (ref 7–22)
CALCIUM SERPL-MCNC: 8.2 MG/DL (ref 8.5–10.5)
CHLORIDE SERPL-SCNC: 105 MEQ/L (ref 98–111)
CO2 SERPL-SCNC: 27 MEQ/L (ref 23–33)
CREAT SERPL-MCNC: 0.5 MG/DL (ref 0.4–1.2)
DEPRECATED RDW RBC AUTO: 60 FL (ref 35–45)
EOSINOPHIL NFR BLD AUTO: 1.5 %
EOSINOPHILS ABSOLUTE: 0.1 THOU/MM3 (ref 0–0.4)
ERYTHROCYTE [DISTWIDTH] IN BLOOD BY AUTOMATED COUNT: 17.5 % (ref 11.5–14.5)
GFR SERPL CREATININE-BSD FRML MDRD: > 60 ML/MIN/1.73M2
GLUCOSE BLD STRIP.AUTO-MCNC: 85 MG/DL (ref 70–108)
GLUCOSE SERPL-MCNC: 58 MG/DL (ref 70–108)
HCT VFR BLD AUTO: 25.7 % (ref 37–47)
HGB BLD-MCNC: 7.8 GM/DL (ref 12–16)
IMM GRANULOCYTES # BLD AUTO: 0.07 THOU/MM3 (ref 0–0.07)
IMM GRANULOCYTES NFR BLD AUTO: 1.1 %
LACTATE SERPL-SCNC: 0.6 MMOL/L (ref 0.5–2)
LYMPHOCYTES ABSOLUTE: 1.4 THOU/MM3 (ref 1–4.8)
LYMPHOCYTES NFR BLD AUTO: 21.9 %
MAGNESIUM SERPL-MCNC: 1.6 MG/DL (ref 1.6–2.4)
MCH RBC QN AUTO: 28.9 PG (ref 26–33)
MCHC RBC AUTO-ENTMCNC: 30.4 GM/DL (ref 32.2–35.5)
MCV RBC AUTO: 95.2 FL (ref 81–99)
MONOCYTES ABSOLUTE: 0.6 THOU/MM3 (ref 0.4–1.3)
MONOCYTES NFR BLD AUTO: 9.3 %
NEUTROPHILS NFR BLD AUTO: 65.7 %
NRBC BLD AUTO-RTO: 0 /100 WBC
PLATELET # BLD AUTO: 297 THOU/MM3 (ref 130–400)
PMV BLD AUTO: 9 FL (ref 9.4–12.4)
POTASSIUM SERPL-SCNC: 3.2 MEQ/L (ref 3.5–5.2)
PROCALCITONIN SERPL IA-MCNC: 0.08 NG/ML (ref 0.01–0.09)
RBC # BLD AUTO: 2.7 MILL/MM3 (ref 4.2–5.4)
SEGMENTED NEUTROPHILS ABSOLUTE COUNT: 4.1 THOU/MM3 (ref 1.8–7.7)
SODIUM SERPL-SCNC: 140 MEQ/L (ref 135–145)
WBC # BLD AUTO: 6.2 THOU/MM3 (ref 4.8–10.8)

## 2023-02-22 PROCEDURE — 80048 BASIC METABOLIC PNL TOTAL CA: CPT

## 2023-02-22 PROCEDURE — 99232 SBSQ HOSP IP/OBS MODERATE 35: CPT | Performed by: PHYSICAL MEDICINE & REHABILITATION

## 2023-02-22 PROCEDURE — 2700000000 HC OXYGEN THERAPY PER DAY

## 2023-02-22 PROCEDURE — 97130 THER IVNTJ EA ADDL 15 MIN: CPT

## 2023-02-22 PROCEDURE — 36415 COLL VENOUS BLD VENIPUNCTURE: CPT

## 2023-02-22 PROCEDURE — 82948 REAGENT STRIP/BLOOD GLUCOSE: CPT

## 2023-02-22 PROCEDURE — 83605 ASSAY OF LACTIC ACID: CPT

## 2023-02-22 PROCEDURE — 97530 THERAPEUTIC ACTIVITIES: CPT

## 2023-02-22 PROCEDURE — 6370000000 HC RX 637 (ALT 250 FOR IP): Performed by: FAMILY MEDICINE

## 2023-02-22 PROCEDURE — 83735 ASSAY OF MAGNESIUM: CPT

## 2023-02-22 PROCEDURE — 97110 THERAPEUTIC EXERCISES: CPT

## 2023-02-22 PROCEDURE — 97129 THER IVNTJ 1ST 15 MIN: CPT

## 2023-02-22 PROCEDURE — 2580000003 HC RX 258: Performed by: PHYSICAL MEDICINE & REHABILITATION

## 2023-02-22 PROCEDURE — 85025 COMPLETE CBC W/AUTO DIFF WBC: CPT

## 2023-02-22 PROCEDURE — 97535 SELF CARE MNGMENT TRAINING: CPT

## 2023-02-22 PROCEDURE — 6370000000 HC RX 637 (ALT 250 FOR IP): Performed by: PHYSICAL MEDICINE & REHABILITATION

## 2023-02-22 PROCEDURE — 84145 PROCALCITONIN (PCT): CPT

## 2023-02-22 PROCEDURE — 1180000000 HC REHAB R&B

## 2023-02-22 RX ORDER — POTASSIUM CHLORIDE 600 MG/1
16 TABLET, FILM COATED, EXTENDED RELEASE ORAL 2 TIMES DAILY
Status: COMPLETED | OUTPATIENT
Start: 2023-02-22 | End: 2023-02-22

## 2023-02-22 RX ORDER — MAGNESIUM HYDROXIDE/ALUMINUM HYDROXICE/SIMETHICONE 120; 1200; 1200 MG/30ML; MG/30ML; MG/30ML
30 SUSPENSION ORAL EVERY 6 HOURS PRN
Status: DISCONTINUED | OUTPATIENT
Start: 2023-02-22 | End: 2023-03-02 | Stop reason: HOSPADM

## 2023-02-22 RX ORDER — INSULIN GLARGINE 100 [IU]/ML
24 INJECTION, SOLUTION SUBCUTANEOUS NIGHTLY
Status: DISCONTINUED | OUTPATIENT
Start: 2023-02-22 | End: 2023-02-23

## 2023-02-22 RX ORDER — MEGESTROL ACETATE 40 MG/1
40 TABLET ORAL DAILY
Status: DISCONTINUED | OUTPATIENT
Start: 2023-02-22 | End: 2023-03-02 | Stop reason: HOSPADM

## 2023-02-22 RX ORDER — FERROUS SULFATE 325(65) MG
325 TABLET ORAL
Status: DISCONTINUED | OUTPATIENT
Start: 2023-02-22 | End: 2023-02-28

## 2023-02-22 RX ADMIN — Medication 1 CAPSULE: at 08:49

## 2023-02-22 RX ADMIN — Medication 1 TABLET: at 08:49

## 2023-02-22 RX ADMIN — MEGESTROL ACETATE 40 MG: 40 TABLET ORAL at 11:37

## 2023-02-22 RX ADMIN — FERROUS SULFATE TAB 325 MG (65 MG ELEMENTAL FE) 325 MG: 325 (65 FE) TAB at 08:50

## 2023-02-22 RX ADMIN — Medication 6 MG: at 22:08

## 2023-02-22 RX ADMIN — METOPROLOL SUCCINATE 25 MG: 25 TABLET, FILM COATED, EXTENDED RELEASE ORAL at 08:50

## 2023-02-22 RX ADMIN — DICLOFENAC SODIUM 2 G: 10 GEL TOPICAL at 22:23

## 2023-02-22 RX ADMIN — FLUCONAZOLE 200 MG: 200 TABLET ORAL at 08:49

## 2023-02-22 RX ADMIN — FOLIC ACID 1 MG: 1 TABLET ORAL at 08:50

## 2023-02-22 RX ADMIN — MICONAZOLE NITRATE: 20 POWDER TOPICAL at 22:23

## 2023-02-22 RX ADMIN — ACETAMINOPHEN 650 MG: 325 TABLET ORAL at 08:49

## 2023-02-22 RX ADMIN — ALUMINUM HYDROXIDE, MAGNESIUM HYDROXIDE, AND SIMETHICONE 30 ML: 200; 200; 20 SUSPENSION ORAL at 13:25

## 2023-02-22 RX ADMIN — SODIUM CHLORIDE, PRESERVATIVE FREE 10 ML: 5 INJECTION INTRAVENOUS at 18:20

## 2023-02-22 RX ADMIN — MICONAZOLE NITRATE: 20 POWDER TOPICAL at 11:38

## 2023-02-22 RX ADMIN — POTASSIUM CHLORIDE 16 MEQ: 600 TABLET, FILM COATED, EXTENDED RELEASE ORAL at 22:08

## 2023-02-22 RX ADMIN — ASPIRIN 81 MG 81 MG: 81 TABLET ORAL at 08:49

## 2023-02-22 RX ADMIN — DICLOFENAC SODIUM 2 G: 10 GEL TOPICAL at 11:37

## 2023-02-22 RX ADMIN — POTASSIUM CHLORIDE 16 MEQ: 600 TABLET, FILM COATED, EXTENDED RELEASE ORAL at 11:37

## 2023-02-22 RX ADMIN — SODIUM CHLORIDE, PRESERVATIVE FREE 10 ML: 5 INJECTION INTRAVENOUS at 22:08

## 2023-02-22 RX ADMIN — LOPERAMIDE HYDROCHLORIDE 2 MG: 2 CAPSULE ORAL at 17:09

## 2023-02-22 RX ADMIN — NALOXEGOL OXALATE 12.5 MG: 12.5 TABLET, FILM COATED ORAL at 06:35

## 2023-02-22 RX ADMIN — DICLOFENAC SODIUM 2 G: 10 GEL TOPICAL at 17:12

## 2023-02-22 RX ADMIN — ACETAMINOPHEN 650 MG: 325 TABLET ORAL at 22:23

## 2023-02-22 RX ADMIN — ROSUVASTATIN 10 MG: 10 TABLET, FILM COATED ORAL at 22:08

## 2023-02-22 RX ADMIN — PROMETHAZINE HYDROCHLORIDE 25 MG: 25 TABLET ORAL at 17:09

## 2023-02-22 RX ADMIN — PANTOPRAZOLE SODIUM 40 MG: 40 TABLET, DELAYED RELEASE ORAL at 06:35

## 2023-02-22 RX ADMIN — FUROSEMIDE 40 MG: 40 TABLET ORAL at 08:49

## 2023-02-22 RX ADMIN — RIVAROXABAN 20 MG: 20 TABLET, FILM COATED ORAL at 17:09

## 2023-02-22 RX ADMIN — DICLOFENAC SODIUM 2 G: 10 GEL TOPICAL at 08:51

## 2023-02-22 RX ADMIN — LOPERAMIDE HYDROCHLORIDE 2 MG: 2 CAPSULE ORAL at 22:08

## 2023-02-22 RX ADMIN — QUETIAPINE FUMARATE 25 MG: 25 TABLET ORAL at 22:08

## 2023-02-22 RX ADMIN — ACETAMINOPHEN 650 MG: 325 TABLET ORAL at 15:44

## 2023-02-22 RX ADMIN — TRAZODONE HYDROCHLORIDE 100 MG: 100 TABLET ORAL at 22:08

## 2023-02-22 ASSESSMENT — PAIN SCALES - GENERAL
PAINLEVEL_OUTOF10: 4
PAINLEVEL_OUTOF10: 4
PAINLEVEL_OUTOF10: 3
PAINLEVEL_OUTOF10: 8

## 2023-02-22 ASSESSMENT — ENCOUNTER SYMPTOMS
BACK PAIN: 1
CONSTIPATION: 0
DIARRHEA: 0
SHORTNESS OF BREATH: 0
SORE THROAT: 0
ABDOMINAL PAIN: 0
NAUSEA: 0
ABDOMINAL DISTENTION: 1
WHEEZING: 0
TROUBLE SWALLOWING: 0
COUGH: 0
VOMITING: 0
RHINORRHEA: 0

## 2023-02-22 ASSESSMENT — PAIN DESCRIPTION - LOCATION
LOCATION: HIP
LOCATION: HIP
LOCATION: ABDOMEN
LOCATION: HIP

## 2023-02-22 ASSESSMENT — PAIN DESCRIPTION - ORIENTATION
ORIENTATION: RIGHT
ORIENTATION: RIGHT
ORIENTATION: MID;UPPER
ORIENTATION: RIGHT

## 2023-02-22 ASSESSMENT — PAIN DESCRIPTION - ONSET: ONSET: ON-GOING

## 2023-02-22 ASSESSMENT — PAIN DESCRIPTION - DESCRIPTORS
DESCRIPTORS: ACHING

## 2023-02-22 ASSESSMENT — PAIN DESCRIPTION - PAIN TYPE: TYPE: SURGICAL PAIN

## 2023-02-22 ASSESSMENT — PAIN - FUNCTIONAL ASSESSMENT: PAIN_FUNCTIONAL_ASSESSMENT: PREVENTS OR INTERFERES SOME ACTIVE ACTIVITIES AND ADLS

## 2023-02-22 ASSESSMENT — PAIN DESCRIPTION - FREQUENCY: FREQUENCY: CONTINUOUS

## 2023-02-22 NOTE — PLAN OF CARE
Problem: Discharge Planning  Goal: Discharge to home or other facility with appropriate resources  2/22/2023 1122 by FAITH Jade  Note: Team conference held Wednesday, 2/22. Recommendations of the team were explained to the patient, Yari Odell and Kimberly by Dr Lui Walker and SERVANDO. Team is recommending that patient continue on acute inpatient rehab for PT, OT and ST, with expected discharge date of Monday, 2/27. Following discharge, team is recommending SNF for continued therapy. Patient and family expressed wanting to continue her therapy at [facility]. Dr Lui Walker and SW educated them having to follow Medicare days. Family plans on making appeal to Medicare prior to discharge. Patient and family are open into looking at [facility] for continued therapy. Care plan reviewed with patient and family. Patient and family verbalized understanding of the plan of care and contributed to goal setting. Post-acute [facility]) provider list was provided to patient. Patient was informed of their freedom to choose [facility] provider. Discussed and offered to show the patient the relevant [facility] Providers quality and resource use measures on Medicare Compare web site via computer based on patient's goals of care and treatment preferences. Questions regarding selection process were answered. Yari Odell reported first choice being [facility]. Yari Odell and Kimberly will review information and let SW know which SNF they would like a referral to. SW to follow and maintain involvement in discharge planning.

## 2023-02-22 NOTE — PLAN OF CARE
Problem: Chronic Conditions and Co-morbidities  Goal: Patient's chronic conditions and co-morbidity symptoms are monitored and maintained or improved  Outcome: Progressing  Flowsheets (Taken 2/22/2023 1450)  Care Plan - Patient's Chronic Conditions and Co-Morbidity Symptoms are Monitored and Maintained or Improved:   Monitor and assess patient's chronic conditions and comorbid symptoms for stability, deterioration, or improvement   Collaborate with multidisciplinary team to address chronic and comorbid conditions and prevent exacerbation or deterioration  Note: Potassium 3.2 today so potassium replacement completed. Problem: Discharge Planning  Goal: Discharge to home or other facility with appropriate resources  2/22/2023 1450 by Avtar Murray RN  Outcome: Progressing  Flowsheets (Taken 2/22/2023 1450)  Discharge to home or other facility with appropriate resources: Identify barriers to discharge with patient and caregiver  Note: Patients plan is for her to be discharged 2/27 to a SNF  2/22/2023 1122 by FAITH Gutierrez  Note: Team conference held Wednesday, 2/22. Recommendations of the team were explained to the patient, Kerri Mendoza and Kimberly by Dr Madison Mann and SERVANDO. Team is recommending that patient continue on acute inpatient rehab for PT, OT and ST, with expected discharge date of Monday, 2/27. Following discharge, team is recommending SNF for continued therapy. Patient and family expressed wanting to continue her therapy at [facility]. Dr Madison Mann and SERVANDO educated them having to follow Medicare days. Family plans on making appeal to Medicare prior to discharge. Patient and family are open into looking at [facility] for continued therapy. Care plan reviewed with patient and family. Patient and family verbalized understanding of the plan of care and contributed to goal setting. Post-acute [facility]) provider list was provided to patient. Patient was informed of their freedom to choose [facility] provider.  Discussed and offered to show the patient the relevant [facility] Providers quality and resource use measures on Medicare Compare web site via computer based on patient's goals of care and treatment preferences. Questions regarding selection process were answered. Thalia Gross reported first choice being [facility]. Thalia Gross and Kimberly will review information and let SW know which SNF they would like a referral to. SW to follow and maintain involvement in discharge planning. Problem: Skin/Tissue Integrity  Goal: Absence of new skin breakdown  Description: 1. Monitor for areas of redness and/or skin breakdown  2. Assess vascular access sites hourly  3. Every 4-6 hours minimum:  Change oxygen saturation probe site  4. Every 4-6 hours:  If on nasal continuous positive airway pressure, respiratory therapy assess nares and determine need for appliance change or resting period. Outcome: Progressing  Note: No new skin breakdown noted at this time     Problem: Safety - Adult  Goal: Free from fall injury  Outcome: Progressing  Flowsheets (Taken 2/22/2023 1450)  Free From Fall Injury: Instruct family/caregiver on patient safety  Note: Patient remains free from falls at this time.      Problem: Pain  Goal: Verbalizes/displays adequate comfort level or baseline comfort level  Outcome: Progressing  Flowsheets (Taken 2/22/2023 1450)  Verbalizes/displays adequate comfort level or baseline comfort level:   Encourage patient to monitor pain and request assistance   Assess pain using appropriate pain scale   Administer analgesics based on type and severity of pain and evaluate response   Notify Licensed Independent Practitioner if interventions unsuccessful or patient reports new pain   Implement non-pharmacological measures as appropriate and evaluate response  Note: Patients pain is controlled with the current regimen     Problem: Nutrition Deficit:  Goal: Optimize nutritional status  Outcome: Not Progressing  Flowsheets (Taken 2/22/2023 1450)  Nutrient intake appropriate for improving, restoring, or maintaining nutritional needs: Assess nutritional status and recommend course of action  Note: Megace started today     Problem: Metabolic/Fluid and Electrolytes - Adult  Goal: Electrolytes maintained within normal limits  Outcome: Not Progressing  Goal: Hemodynamic stability and optimal renal function maintained  Outcome: Progressing  Goal: Glucose maintained within prescribed range  Outcome: Progressing     Problem: Nutrition Deficit:  Goal: Optimize nutritional status  Outcome: Not Progressing  Flowsheets (Taken 2/22/2023 1450)  Nutrient intake appropriate for improving, restoring, or maintaining nutritional needs: Assess nutritional status and recommend course of action  Note: Megace started today     Problem: Metabolic/Fluid and Electrolytes - Adult  Goal: Electrolytes maintained within normal limits  Outcome: Not Progressing

## 2023-02-22 NOTE — PROGRESS NOTES
[address]  Occupational Therapy  Daily Note  Time:    Time In: 1130  Time Out: 1200  Timed Code Treatment Minutes: 30 Minutes  Minutes: 30          Date: 2023  Patient Name: Nimo Maloney,   Gender: female      Room: -55/055-A  MRN: [id number]  : 1950  (81 y.o.)  Referring Practitioner: Tereza Bergeron MD  Diagnosis: closed intertrochanteric fracture, right  Additional Pertinent Hx: The patient was recently hospitalized from 2023 to 2023 initially for abdominal bloating, nausea and leg swelling. She was found to have elevated D-dimer. Subsequent testing revealed right lower lobe pulmonary embolism and right lower extremity DVT. She was at initially treated with IV heparin and later transition to [address] [place] [address]. The patient was brought to ER on 2022 because of progressively increased leg swelling, and nausea/vomiting associated with decreased oral intake. She was found to have BNP of 256.7 and Bumex was prescribed. She then had a fall accident  when she was going to toilet while she was in ER on 2022. The fall resulted severe right hip pain. X-ray of right hip and pelvis revealed acute right femoral intertrochanteric fracture with subtrochanteric extension. Orthopedic service was consulted. Xarelto was held in preparation for surgical management. Cardiology was consulted on 2023 for elevated BNP and Lasix was started. Because of difficulty voiding with urinary retention, urology was also consulted on 2023. Urology service placed Wen with some difficulty on 2023. Therefore Wen was kept in place. On 2/3/2023 the patient underwent open treatment of right intertrochanteric fracture with cephalomedullary nail by Dr. Nava Underwood. She is allowed weightbearing as tolerated at the right lower extremity postoperatively.     Restrictions/Precautions:  Restrictions/Precautions: General Precautions, Fall Risk, Weight Bearing  Right Lower Extremity Weight Bearing: Weight Bearing As Tolerated  Position Activity Restriction  Other position/activity restrictions: Tracheostomy/collar 6L. With venturi mask for out of room use 8 liters portable O2. SUBJECTIVE: Upon arrival pt supine in bed and just finished with PT session. Pt. Requests in bed activity during this session. PAIN: 10/10: R hip region    Vitals: Vitals not assessed per clinical judgement, see nursing flowsheet    COGNITION: Decreased Insight    ADL:   No ADL's completed this session. As all ADLs completed prior to this OT session. ADDITIONAL ACTIVITIES:  Pt. Provided with 2# weighted dowel mike with instruction to complete 15 reps in all available joints/planes with occ cues for proper technique/pace, task performed to improve pt's performance with daily transfers. Task completed in supine with HOB slightly elevated. OTR provided education to pt, pt's daughter and significant other in regards to current level of assistance required to carryout daily occupations and d/c planning, all attentive and verbalized understanding. ASSESSMENT:     Activity Tolerance:  Patient tolerance of  treatment: Fair treatment tolerance, Limited by pain, Limited by fatigue, and Need for increased rest breaks       Discharge Recommendations: Subacute/skilled nursing facility, 24 hour assistance or supervision, and Patient would benefit from continued OT at discharge  Equipment Recommendations: Other: Monitor pending progress  Plan: Times Per Week: 5x per week for 90 minutes  Times Per Day:  Once a day  Current Treatment Recommendations: Balance training, Functional mobility training, Endurance training, Self-Care / ADL, Safety education & training, Patient/Caregiver education & training, Strengthening    Patient Education  Patient Education: Home Exercise Program    Goals  Short Term Goals  Time Frame for Short Term Goals: until discharge  Short Term Goal 1: Pt will tolerate 12-15 min EOB ADL task with S to improve trunk control and activity tolerance required for EOB ADLs. Short Term Goal 2: Pt will complete sit-stand t/fs with Min A x 1 with minimal cues of technique to progress towards BSC t/fs  Short Term Goal 3: Pt will tolerate standing 3 min with CGA for increased ease of clothing management nad LB bathing routine  Short Term Goal 4: Pt will safely navigate short distances with no > than Min A x1 and min VC for safety to increase indep with ADLs  Long Term Goals  Time Frame for Long Term Goals : 2 weeks from IPR OT eval  Long Term Goal 1: Pt will complete sit-stand t/fs with SBA x 1 with minimal cues of technique to progress towards BSC t/fs  Long Term Goal 2: Pt will perform UB/LB dressing and bathing with Min A and minimal cues for ECT to improve functional independence. Following session, patient left in safe position with all fall risk precautions in place.

## 2023-02-22 NOTE — PROGRESS NOTES
[address] [address]  INPATIENT PHYSICAL THERAPY  Daily Note  [address] [employer] - 7E-55/055-A    Time In: 0730  Time Out: 0830  Timed Code Treatment Minutes: 60 Minutes  Minutes: 60          Date: 2023  Patient Name: Clint Johnson,  Gender:  female        MRN: [id number]  : 1950  (81 y.o.)     Referring Practitioner: Naomi Ortiz MD  Diagnosis: closed intertrochanteric fx, Rt  Additional Pertinent Hx: Jean-Pierre Pires is a 73yoF with PMH of recent PE and b/l DVT, macrocytic anemia, CAD, DM2, tracheostomy dependent, obesity, and HTN who presents for leg swelling, nausea, and constipation. She was recently discharged after an 8 day admission for DVTs and PEs. She was placed on Xarelto when discharged and stated that she has been compliant and only missed one dose. She returned due to chronic constipation, feeling ''bloated'', nausea, and LE edema. While being evaluated in the ED the pt had a mechanical fall and developed a R femur fracture. She was found to have supratheraputic INR at 3.83. CT head and C-spine both clear. Pt is s/p R femur ORIF by Dr Meron Saldivar . Pt found to have acute fractures of L2-S1 likely 2/2 osteoporosis, ok for activity as tolerated per Dr Werner Berry . Prior Level of Function:  Lives With: Spouse  Type of Home: House  Home Layout: One level  Home Access: Ramped entrance (or 1 step with no rails.)  Home Equipment: Arliss Custard, rolling, Lift chair (Pt sleeps in lift chair and was using it to assist up to stand PTA)   Bathroom Shower/Tub: Walk-in shower  Bathroom Toilet: Standard  Bathroom Equipment: Built-in shower seat, 3-in-1 commode    ADL Assistance: Needs assistance  Homemaking Assistance: Needs assistance  Ambulation Assistance: Independent  Transfer Assistance: Independent  Active : No  Additional Comments: pt amb short distances in the home with RW,  available  and able to assist as needed.  Has a passi valve that she uses at rest.    Restrictions/Precautions:  Restrictions/Precautions: General Precautions, Fall Risk, Weight Bearing  Right Lower Extremity Weight Bearing: Weight Bearing As Tolerated  Position Activity Restriction  Other position/activity restrictions: Tracheostomy/collar 6L. With venturi mask for out of room use 8 liters portable O2. SUBJECTIVE: Pt. Ric Plants in her bed and pleasantly agrees to therapy session. Pt. Requests to use BS commode at beginning of session for small BM. Extra time required to get dressed, manage lines, and manage equipment. Pt. Reports urge to have another BM at end of session but feeling went away before arriving back to her room. Pain: None indicated    Vitals:   Patient Vitals for the past 8 hrs:   BP Patient Position Temp Temp src Pulse Resp SpO2 O2 Device O2 Flow Rate (L/min)   02/22/23 0907 -- -- -- -- -- -- -- Trach mask 5 L/min   02/22/23 0839 125/80 Sitting 98.1 °F (36.7 °C) Oral 92 18 100 % Trach mask 5 L/min     *Vitals may reflect data entered into the flowsheet prior to or after PT session was completed. OBJECTIVE:  Bed Mobility:  Rolling to Left: Minimal Assistance   Supine to Sit: Moderate Assistance  Scooting: Moderate Assistance    Transfers:  Sit to Stand: Contact Guard Assistance, Minimal Assistance  Stand to [address] with walker from [facility] to [facility]. Pt. With forward flexed posture, relying heavily on walker. To/From Bed and Chair: Dependent, with Camelia Roro    Ambulation:  None    Stairs:  None    Balance:  Static Standing Balance: Contact Guard Assistance, Minimal Assistance  Dynamic Standing Balance: Contact Guard Assistance, Minimal Assistance  Pt. Stood multiple times in jose stedy while assisting with pulling up depends and while PTA completed pericare. Pt steady overall but limited by fatigue. Static Sitting Balance: Stand by assistance while completing upper body dressing.      Neuromuscular Re-education  None    Exercise:  Patient was guided in 1 set(s) 10 reps of exercises: Glut sets, Seated marches, Seated hamstring curls, Seated heel/toe raises, Long arc quads, Seated isometric hip adduction, Seated abduction/adduction, and abdominal isometrics,. Exercises were completed for increased independence with functional mobility. Functional Outcome Measures:   Not completed    ASSESSMENT:  Assessment: Patient progressing toward established goals. Activity Tolerance:  Patient tolerance of  treatment: good. Equipment Recommendations:Equipment Needed: No  Other: Pt has RW, manual w/c and mechanical lift device  Discharge Recommendations: Continue to assess pending progress, Patient would benefit from continued therapy after discharge     PLAN: Current Treatment Recommendations: Strengthening, Balance training, Functional mobility training, Transfer training, Endurance training, Equipment evaluation, education, & procurement, Patient/Caregiver education & training, Safety education & training, Therapeutic activities, Home exercise program, Wheelchair mobility training, Gait training, Pain management  General Plan:  (5x/ wk 90 min)    Patient Education  Patient Education: Plan of Care, Functional Mobility, Reviewed Prior Education, Health Promotion and Wellness Education, Safety, Verbal Exercise Instruction    Goals:  Patient Goals : get around o my own without the RW  Short Term Goals  Time Frame for Short Term Goals: 1 wk  Short Term Goal 1: Pt will go from supine <->sit, mod +1 to get in/out of bed. Short Term Goal 2: Pt will get up/down from bed, into JEANNETTE stedy device, +1 mod assist to progress to up to RW GOAL MET, SEE LTG  Short Term Goal 3: Pt will  the JEANNETTE stedy device, +1 min assist x5 min, to progress to standing with RW  Short Term Goal 4: Pt will propel w/c >= 50 ft, tile surface, CGA for home mobility.  GOAL MET, SEE LTG  Long Term Goals  Time Frame for Long Term Goals : 3 wks from evaluation. Long Term Goal 1: Pt to go supine <->sit, min +1 to get in/out of bed. Long Term Goal 2: Pt to get up/down from bed or w/c +1 min assist to get up to walk. Long Term Goal 3: Pt to perform stand/pivot transfer with RW, +1 min assist to get in/out of w/c. Long Term Goal 4: Pt to walk with RW >= 10 ft, mn +1 to progress to home mobility  Long Term Goal 5: Pt to propel w/c >= 50 ft, Mod I, for home mobility  Additional Goals?: Yes  Long term goal 6: Pt to get in/out of car, min +1 for transportation needs. Following session, patient left in safe position with all fall risk precautions in place.

## 2023-02-22 NOTE — PLAN OF CARE
Problem: Chronic Conditions and Co-morbidities  Goal: Patient's chronic conditions and co-morbidity symptoms are monitored and maintained or improved  2/21/2023 2338 by Alex Gabriel RN  Outcome: Progressing  Flowsheets (Taken 2/21/2023 2115)  Care Plan - Patient's Chronic Conditions and Co-Morbidity Symptoms are Monitored and Maintained or Improved:   Monitor and assess patient's chronic conditions and comorbid symptoms for stability, deterioration, or improvement   Collaborate with multidisciplinary team to address chronic and comorbid conditions and prevent exacerbation or deterioration   Update acute care plan with appropriate goals if chronic or comorbid symptoms are exacerbated and prevent overall improvement and discharge  2/21/2023 1425 by Marjorie Cardoza RN  Outcome: Progressing  Flowsheets (Taken 2/21/2023 1425)  Care Plan - Patient's Chronic Conditions and Co-Morbidity Symptoms are Monitored and Maintained or Improved: Monitor and assess patient's chronic conditions and comorbid symptoms for stability, deterioration, or improvement  Note: Patient s cardiopulmonary status remains stable at this time.       Problem: Discharge Planning  Goal: Discharge to home or other facility with appropriate resources  2/21/2023 2338 by Alex Gabriel RN  Outcome: Progressing  Flowsheets (Taken 2/21/2023 2115)  Discharge to home or other facility with appropriate resources:   Identify barriers to discharge with patient and caregiver   Arrange for needed discharge resources and transportation as appropriate   Identify discharge learning needs (meds, wound care, etc)   Refer to discharge planning if patient needs post-hospital services based on physician order or complex needs related to functional status, cognitive ability or social support system  2/21/2023 1425 by Marjorie Cardoza RN  Outcome: Progressing  Flowsheets (Taken 2/21/2023 1425)  Discharge to home or other facility with appropriate resources: Identify barriers to discharge with patient and caregiver   Identify discharge learning needs (meds, wound care, etc)  Note: Patients discharge date has not been determined at this time patient care conference is tomorrow. Problem: Skin/Tissue Integrity  Goal: Absence of new skin breakdown  Description: 1. Monitor for areas of redness and/or skin breakdown  2. Assess vascular access sites hourly  3. Every 4-6 hours minimum:  Change oxygen saturation probe site  4. Every 4-6 hours:  If on nasal continuous positive airway pressure, respiratory therapy assess nares and determine need for appliance change or resting period. 2/21/2023 2338 by Olivia Chicas RN  Outcome: Progressing  2/21/2023 1425 by Nj Joy RN  Outcome: Progressing  Note: Patient remains free from any new skin breakdown at this time. Problem: ABCDS Injury Assessment  Goal: Absence of physical injury  Outcome: Progressing     Problem: Safety - Adult  Goal: Free from fall injury  2/21/2023 2338 by Olivia Chicas RN  Outcome: Progressing  2/21/2023 1425 by Nj Joy RN  Outcome: Progressing  Flowsheets (Taken 2/21/2023 1425)  Free From Fall Injury: Instruct family/caregiver on patient safety  Note: Patient remains free from falls at this time. Problem: Pain  Goal: Verbalizes/displays adequate comfort level or baseline comfort level  2/21/2023 2338 by Olivia Chicas RN  Outcome: Progressing  2/21/2023 1425 by Nj Joy RN  Outcome: Progressing  Flowsheets (Taken 2/21/2023 1425)  Verbalizes/displays adequate comfort level or baseline comfort level:   Encourage patient to monitor pain and request assistance   Assess pain using appropriate pain scale  Note: Patients pain is controlled with Prn Tramadol.      Problem: Nutrition Deficit:  Goal: Optimize nutritional status  2/21/2023 2338 by Olivia Chicas RN  Outcome: Progressing  2/21/2023 1425 by Nj Joy RN  Outcome: Progressing     Problem: Metabolic/Fluid and Electrolytes - Adult  Goal: Electrolytes maintained within normal limits  Outcome: Progressing  Flowsheets (Taken 2/21/2023 2115)  Electrolytes maintained within normal limits:   Monitor labs and assess patient for signs and symptoms of electrolyte imbalances   Fluid restriction as ordered   Instruct patient on fluid and nutrition restrictions as appropriate  Goal: Hemodynamic stability and optimal renal function maintained  Outcome: Progressing  Flowsheets (Taken 2/21/2023 2115)  Hemodynamic stability and optimal renal function maintained:   Monitor labs and assess for signs and symptoms of volume excess or deficit   Monitor intake, output and patient weight   Monitor response to interventions for patient's volume status, including labs, urine output, blood pressure (other measures as available)   Encourage oral intake as appropriate   Instruct patient on fluid and nutrition restrictions as appropriate  Goal: Glucose maintained within prescribed range  Outcome: Progressing  Flowsheets (Taken 2/21/2023 2115)  Glucose maintained within prescribed range: Monitor blood glucose as ordered

## 2023-02-22 NOTE — PROGRESS NOTES
Physical Medicine & Rehabilitation Progress Note    Chief Complaint:  Right hip fracture, low back compression fracture    Subjective:    Fly Bustillo is a 81 y.o. 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 The patient previously was admitted to inpatient rehab service from 2022 to 2022 due to critical care myopathy and debility/physical decondition as result of COVID-19 pneumonia. She was discharged to home with referral for home care service on 2022. The patient developed progressive difficulty breathing in 2022 and was found to have glottic stenosis and eventually received tracheostomy by Dr. Ashley Ridley on 2022. The patient's  says the patient also developed progressively worsening lower back pain in summer 2022 and saw orthopedics surgeon at [facility]. The patient has been says the patient was diagnosed of having \"lumbar spine stress fracture\". No surgical intervention was performed. She was treated with brace which she later abandoned due to discomfort associate with brace usage.      The patient was recently hospitalized from 2023 to 2023 initially for abdominal bloating, nausea and leg swelling. She was found to have elevated D-dimer. Subsequent testing revealed right lower lobe pulmonary embolism and right lower extremity DVT. She was at initially treated with IV heparin and later transition to [address] [place] [address]. The patient was brought to ER on 2/1/2022 because of progressively increased leg swelling, and nausea/vomiting associated with decreased oral intake. She was found to have BNP of 256.7 and Bumex was prescribed. She then had a fall accident  when she was going to toilet while she was in ER on 2/1/2022. The fall resulted severe right hip pain. X-ray of right hip and pelvis revealed acute right femoral intertrochanteric fracture with subtrochanteric extension. Orthopedic service was consulted. Xarelto was held in preparation for surgical management. Cardiology was consulted on 2/2/2023 for elevated BNP and Lasix was started. Because of difficulty voiding with urinary retention, urology was also consulted on 2/2/2023. Urology service placed Wen with some difficulty on 2/2/2023. Therefore Wen was kept in place. On 2/3/2023 the patient underwent open treatment of right intertrochanteric fracture with cephalomedullary nail by Dr. Shahla Ortiz. She is allowed weightbearing as tolerated at the right lower extremity postoperatively. PM&R was consulted on 2/5/2023. During the evaluation the patient's  said that the patient began having progressively worsened low back pain starting in summer 2022 began interfering the patient's daily function. Therefore she is sore orthopedic physician at [facility]. Imaging tests were done and the patient was told that she had \" stress fracture\" in her spine. She was provided with a lumbar spine brace to wear but she was not compliant with spine brace application because of discomfort it produced while she was wearing it.   Because no outside lumbar spine images study report was available for review, x-ray of lumbar spine was ordered and performed on 2/6/2023 and showed moderate vertebral body spondylosis in the lumbar and lower thoracic spine, moderate degenerative facet arthropathy involving at least lower 3 lumbar levels, lumbar spine osteoporosis, mild superior endplate depression fracture at L2, L3 and L4. Lumbar spine MRI was recommended by radiologist.  Therefore primary team ordered lumbar spine MRI which was performed this afternoon revealing acute fracture with a fluid cleft associated with endplate at V6-M8 levels with mild height loss at each vertebral bodies. The patient was found to have severe anemia with Hgb/Hct dropped from 7.3/24 on 2/4/2023 down to 5.1/16.9 on 2/6/2023. Therefore she was given blood transfusion for few units. CT of abdomen and pelvis was ordered to rule out possible intra-abdominal source of blood loss. CT abdomen and pelvis done on 2/8/2023 show only pericolonic inflammation with diverticula related to acute diverticulitis, acute on chronic compression fracture of L2, L3, L4 and L5, mild, and marked osteopenia. The patient's hospital course also complicated by intermittent confusion, and development of gluteus region skin breakdown. The patient says she feels better today. She still has slight nauseous feeling this morning. She also continue having poor appetite. She says her stomach feels gassy. She continued feeling pain at her right hip, right side lower back, and right groin area with intensity rated at 8/10 level. She did not take tramadol yesterday. She is a potential provide good degree of pain reduction effect. She tolerated today's rehab therapy. The patient's case was discussed in multidisciplinary meeting today.   It was recommended that the patient should continue having rehab intervention at subacute rehab facility after she is discharged from acute rehab program.  The patient is projected discharge date is postponed to on 2/27/2023      Rehabilitation:  PT: Reviewed. Bed Mobility:  Rolling to Left: Minimal Assistance   Supine to Sit: Moderate Assistance  Sit to Supine: Moderate Assistance --with bed rail, head of bed flat  *Moderate assistance to position in bed  Scooting: Moderate Assistance    Transfers:  Sit to Stand: Contact Guard Assistance, Minimal Assistance  Stand to [place] [id number], with verbal cues  Stand Pivot:Contact Guard Assistance with walker from [facility] to  chair. Pt. With forward flexed posture, relying heavily on walker. To/From [facility] chair to : Minimal assistance with RW-fatigues easily and requiring cues for safety. Dependent with use of jose stedy to transfer from [facility] to [facility]. Balance:  Static Standing Balance: Contact Guard Assistance. Pt. Stood at [employer] while PTA completed clothing management for toileting. Pt. Steady with no LOB. Pt. Also stood at RW 5x for ~10\" each attempt. Cues provided for upright posture. Pt. Fatigues quickly while standing requiring extended rest periods between each attempt. Dynamic Standing Balance: Contact Guard Assistance, Minimal Assistance  Pt. Stood multiple times in jose stedy while assisting with pulling up depends and while PTA completed pericare. Pt steady overall but limited by fatigue. Static Sitting Balance: Stand by assistance while completing upper body dressing. Ambulation:  Minimal Assistance, X 2  Distance: a few steps chair <--> bedside commode or chair  -->bed  Surface: Level Tile  Device:Rolling Walker  Gait Deviations:   Forward Flexed Posture, Slow Haylee, Decreased Step Length Bilaterally, Decreased Weight Shift Right, Decreased Gait Speed, and Decreased Heel Strike Bilaterally     Wheelchair Mobility:  Supervision  Extremities Used: Bilateral Upper Extremities  Type of Chair:Manual  Surface: Level Tile  Distance: 76' with 2 rest breaks  Quality: Slow Velocity, Short Strokes, and Decreased Fluidity     Neuromuscular Re-education  Pt. Completed pregait activity stepping fwd/retro with L LE during one standing bout using Bilateral UE support on Rolling Walker to promote R sided weight shifting and advance functional mobility. OT: Reviewed. ADL:   Grooming: with set-up. Oral care and hair care in chair   Bathing: Minimal Assistance. A with bottom, CGA for balance in jose haider. Upper Extremity Dressing: with set-up. Donning overhead gown. Lower Extremity Dressing: Maximum Assistance and X 1. For Depends. Footwear Management: Maximum Assistance. Toileting: Minimal Assistance. A for bowel hygiene   Toilet Transfer: Contact Guard Assistance. BSC . BALANCE:  Sitting Balance:  Modified Independent. Standing Balance: Contact Guard Assistance. Within ADLs within josegianna haider      TRANSFERS:  Sit to Stand:  Contact Guard Assistance. Recliner, BSC. Stand to Sit: Contact Guard Assistance. FUNCTIONAL MOBILITY:  Assistive Device: Rolling Walker  Assist Level:  Minimal Assistance and X 2. Distance:  short distance forward and back  Tendencies to demo short shuffling steps. ADDITIONAL ACTIVITIES:  Patient completed BUE strengthening exercises with skilled education on HEP: completed x10 reps x1 set with a light resistance band in all joints and all planes in order to improve UE strength and activity tolerance required for BADL routine and toilet / shower transfers. Patient tolerated well, requiring min rest breaks. Patient also required min cues for technique. Pt. Engaged in core strengthening task while addressing simultaneous movement in BUE by instructing pt to lean forward in chair to reduce back support to complete 20 reps of table top slides laterally, to/from midline, circular directions, v shape, diagonally pt required min vc for proper technique and to slow pace to ensure full ROM/performance achieved, task performed to improve I with seated ADL routines.   Pt. Required 3 vc throughout to reposition trunk to activate core musculature as well. Pt. Provided with 2# weighted dowel mike with instruction to complete 15 reps in all available joints/planes with occ cues for proper technique/pace, task performed to improve pt's performance with daily transfers. Task completed in supine with HOB slightly elevated. OTR provided education to pt, pt's daughter and significant other in regards to current level of assistance required to carryout daily occupations and d/c planning, all attentive and verbalized understanding. ST: Reviewed. Time Management - Mingly  3/4 independent, 1/4 given min cue     *patient with decreased attention to detail; required min cuing to reread question; good success after cuing     Novel Card Game - Trash  Patient with decreased working memory; required min cuing initially for how to play game; increased success on execution of game play     Recall Novel [id number] GLENBarbara Brand  (Name, setup, how to play, how to win)  Immediate recall: 4/4 independent  Delayed (20 min): 4/4 independent    Up & Down  ST provided patient with farhad and club cards 2-10. Patient instructed to place farhad cards in ascending order (2 through 10) and club cards in descending order (10 through 2) and alternate suit/color. Trial 1: patient with x4 errors in 2:34; ST provided cuing to check pattern for errors; patient independently ID all errors     *patient utilized strategy of sorting cards by suit/color in ascending order to aid in sequencing     Trial 2: patient with x1 self correction in 3:27; ST instructed patient to leave cards scrambled to complete trial     *patient with good visual scanning this trial      Review of Systems:  Review of Systems   Constitutional:  Positive for appetite change. Negative for chills, diaphoresis, fatigue and fever. HENT:  Negative for hearing loss, rhinorrhea, sneezing, sore throat and trouble swallowing. Eyes:  Negative for visual disturbance. Respiratory:  Negative for cough, shortness of breath and wheezing. Cardiovascular:  Negative for chest pain and palpitations. Gastrointestinal:  Positive for abdominal distention. Negative for abdominal pain, constipation, diarrhea, nausea and vomiting. Genitourinary:  Negative for dysuria. Musculoskeletal:  Positive for arthralgias (Bilateral hip, right groin, and right thigh), back pain (Right-sided lower back), gait problem and myalgias (Right thigh). Negative for neck pain. Skin:  Negative for rash. Neurological:  Positive for weakness (Bilateral lower extremities especially on the right side) and numbness (Bilateral feet). Negative for dizziness, tremors, seizures, speech difficulty, light-headedness and headaches. Psychiatric/Behavioral:  Negative for dysphoric mood, hallucinations and sleep disturbance. The patient is not nervous/anxious.          Objective:  /80   Pulse 92   Temp 98.1 °F (36.7 °C) (Oral)   Resp 18   Ht 5' 2\" (1.575 m)   Wt 219 lb 1.6 oz (99.4 kg)   LMP  (LMP Unknown)   SpO2 100%   BMI 40.07 kg/m²   Physical Exam   General:  well-developed, well nourished morbid obese  female ; in no acute distress ; appropriate affect & mood; lying on bed comfortably; receiving humidified oxygen supplement via trach collar; speaking in whispered manner due to presence of tracheostomy  Eyes: pupil equally round ; extra-ocular motion intact bilaterally  Head, Ear, Nose, Mouth & Throat : normocephalic ; no discharge from ears or nose ; no deformity ; no facial swelling ; oral mucosa pink  Neck :  supple ; presence of tracheostomy at anterior neck; no tenderness; mild muscle spasm at upper trapezius muscle  Cardiovascular : regular rate & rhythm ; normal S1 & S2 heart sound ; no murmur ; normal peripheral pulse at bilateral upper & lower extremities  Pulmonary : Breath sounds present at bilateral lung fields; no wheezing ; no rale; no crackle  Gastrointestinal : soft, protruded abdomen; no tenderness; normal bowel sound present    : Wen catheter in place draining light yellowish urine  Back : no tenderness ; no muscle spasm  Skin: no skin lesion or rash ; no pitting edema at bilateral upper extremities; 2+ pitting edema at bilateral legs; presence of healed surgical scar at right upper lateral thigh and hip  Musculoskeletal : no limb asymmetry; no limb deformity; mild tenderness at right lateral hip; no tenderness at bilateral upper extremities & the rest of bilateral lower extremities; no palpable mass at limbs ; right hip and right knee joint laxity not assessed; no joints laxity or crepitation at other limb joints; shoulder flexion and abduction passive ROM reaching 160 degrees bilaterally; right hip flexion passive ROM reaching 70 degrees and right knee flexion passive ROM reaching 80 degrees limited by pain at right hip and thigh; left hip flexion passive ROM reaching 90 degrees; ankle dorsiflexion passive ROM reaching 0 degrees bilaterally; otherwise normal functional joints ROM at the rest of bilateral upper & lower extremities  Cerebral :  alert ; awake ; oriented to place, person and time; follow 1-2 steps verbal command  Cerebellum : no dysmetria with bilateral finger-to-nose test ; unable to perform heel-to-shin test on the right side; dysmetria with left heel-to-shin test  Cranial nerve : CN II to XII function grossly intact  Sensory : intact light touch and pin prick sensation at bilateral upper extremities; reduced light touch and pinprick sensation at distal bilateral lower extremities from upper leg region downward in sock distribution  Motor : normal tone at bilateral upper & left lower extremities ; muscle tone not assessed on right lower extremity due to the pain; 2-/5 muscle strength at right hip flexion, abduction and adduction; 4+/5 muscle strength at the left hip flexion; 3+/5 to 4-/5 muscle strength at right knee extension ; 3+/5 muscle strength at the right knee flexion; otherwise 5/5 muscle strength at the rest of bilateral upper and the lower extremities  Reflex : 1+ bilateral brachioradialis reflexes; 0 bilateral biceps and bilateral knees reflexes   Pathological Reflex :  No Roberta's sign ; no ankle clonus  Gait : Not assessed      Diagnostics:   Recent Results (from the past 24 hour(s))   CBC with Auto Differential    Collection Time: 02/22/23  6:35 AM   Result Value Ref Range    WBC 6.2 4.8 - 10.8 thou/mm3    RBC 2.70 (L) 4.20 - 5.40 mill/mm3    Hemoglobin 7.8 (L) 12.0 - 16.0 gm/dl    Hematocrit 25.7 (L) 37.0 - 47.0 %    MCV 95.2 81.0 - 99.0 fL    MCH 28.9 26.0 - 33.0 pg    MCHC 30.4 (L) 32.2 - 35.5 gm/dl    RDW-CV 17.5 (H) 11.5 - 14.5 %    RDW-SD 60.0 (H) 35.0 - 45.0 fL    Platelets 505 706 - 787 thou/mm3    MPV 9.0 (L) 9.4 - 12.4 fL    Seg Neutrophils 65.7 %    Lymphocytes 21.9 %    Monocytes 9.3 %    Eosinophils 1.5 %    Basophils 0.5 %    Immature Granulocytes 1.1 %    Segs Absolute 4.1 1.8 - 7.7 thou/mm3    Lymphocytes Absolute 1.4 1.0 - 4.8 thou/mm3    Monocytes Absolute 0.6 0.4 - 1.3 thou/mm3    Eosinophils Absolute 0.1 0.0 - 0.4 thou/mm3    Basophils Absolute 0.0 0.0 - 0.1 thou/mm3    Immature Grans (Abs) 0.07 0.00 - 0.07 thou/mm3    nRBC 0 /100 wbc   Basic Metabolic Panel w/ Reflex to MG    Collection Time: 02/22/23  6:35 AM   Result Value Ref Range    Sodium 140 135 - 145 meq/L    Potassium reflex Magnesium 3.2 (L) 3.5 - 5.2 meq/L    Chloride 105 98 - 111 meq/L    CO2 27 23 - 33 meq/L    Glucose 58 (L) 70 - 108 mg/dL    BUN 5 (L) 7 - 22 mg/dL    Creatinine 0.5 0.4 - 1.2 mg/dL    Calcium 8.2 (L) 8.5 - 10.5 mg/dL   Lactic Acid    Collection Time: 02/22/23  6:35 AM   Result Value Ref Range    Lactic Acid 0.6 0.5 - 2.0 mmol/L   Procalcitonin    Collection Time: 02/22/23  6:35 AM   Result Value Ref Range    Procalcitonin 0.08 0.01 - 0.09 ng/mL   Anion Gap    Collection Time: 02/22/23  6:35 AM   Result Value Ref Range    Anion Gap 8.0 8.0 - 16.0 meq/L Magnesium    Collection Time: 02/22/23  6:35 AM   Result Value Ref Range    Magnesium 1.6 1.6 - 2.4 mg/dL   Glomerular Filtration Rate, Estimated    Collection Time: 02/22/23  6:35 AM   Result Value Ref Range    Est, Glom Filt Rate >60 >60 ml/min/1.73m2   POCT glucose    Collection Time: 02/22/23  7:40 AM   Result Value Ref Range    POC Glucose 85 70 - 108 mg/dl       Impression:  Right femoral intertrochanteric fracture with subtrochanteric extension due to fall requiring open reduction and internal fixation with cephalomedullary nail  Glottic stenosis requiring tracheostomy  Persistent chronic low back pain due to lumbar and lower thoracic spine spondylosis with degenerative facet arthropathy at the lower 3 lumbar levels, and acute L2-S1 endplate fractures  Right posterior tibial, left greater saphenous, left peroneal and left anterior tibial veins deep vein thrombosis, and right lower lobe pulmonary embolism  Decreased appetite, nausea and diarrhea most likely due to viral gastroenteritis  Acute anemia requiring blood transfusion  Urinary retention due to urethra obstruction requiring Wen cath placement   Candida albicans UTI  Lumbar spine osteoporosis  Gluteal region decubitus ulcer  Diabetes mellitus type 2 with poor blood glucose control and complicated by bilateral lower extremities diabetic polyneuropathy  Morbid obesity  Hypertension  Hyperlipidemia  History of lower back pain with history of \"lumbar stress fracture\"  History of COVID infection with pneumonia  History of mild cognitive impairment  History of coronary artery disease requiring coronary artery stenting  Grade 1 left ventricular diastolic dysfunction with preserved ejection fraction     The patient is feeling better today. Diarrhea episode had resolved. She still feels slightly nauseous but improving. She continues having no appetite. I will add Megace to stimulate her appetite. She also complains of abdominal gassy sensation.   We can try Maalox or simethicone. Her WBC was not elevated. The blood test show no sign of dehydration or deteriorating renal function. She continues having pain at the right hip but she did not use any tramadol yesterday her right hip remains very weak. The right hip pain-free ROM is improving slowly. She continues tolerating the intensive inpatient rehabilitation treatment today. Her functional improvement has been very slow. I think the patient will need longer term of daily rehabilitation intervention to further improve her function. Therefore I recommend SNF subacute rehab program placement as discharge plan so she can continue receiving daily rehabilitation intervention for longer period of time. Currently the patient is projected to be ready for discharge on 2/27/2023      Plan:  Continues intensive PT/OT/SLP/RT inpatient rehabilitation program at least 3 hours per day, 5 days per week in order to improve functional status prior to discharge. Family education and training will be completed. Equipment evaluations and recommendations will be completed as appropriate. Rehabilitation nursing continues to be involved for bowel, bladder, skin, and pain management. Nursing will also provide education and training to patient and family. Prophylaxis:  DVT: Patient on Xarelto, KENA stocking, intermittent pneumatic compression device.   GI: Colace, Enemeez enema, GlycoLax, Senokot, Movantik, lactulose as needed, milk of magnesium as needed, lactobacillus daily, Imodium as needed for diarrhea, simethicone as needed; add Maalox as needed  Pain: Tylenol, Voltaren gel as needed, tramadol as needed  Continue aspirin for coronary artery disease  Continue Lasix for lower extremities edema  Continue Lantus insulin, Humalog insulin, Humalog insulin coverage for diabetes mellitus  Continue Seroquel for anxiety and insomnia   Continue Xarelto for bilateral DVT and pulmonary embolism  Continue Toprol-XL for hypertension  Continue Crestor for hyperlipidemia  Continue melatonin, trazodone as needed for insomnia  Continue Protonix for gastric protection  Continue simethicone as needed for indigestion  Continue multivitamin and ferrous sulfate for anemia  Continue with Diflucan 200 mg daily for 2 weeks for Candida albicans yeast UTI (started on 2/15/2023; end on 2/28/2023)  Add Megace to stimulate her appetite  Nutrition: Continue current diet  Bladder: Monitoring signs or symptoms of UTI  Bowel: Monitoring signs or symptoms of constipation   and case management for coordination of care and discharge planning      Missed Therapy Time:  None      Kurt Runner, MD

## 2023-02-22 NOTE — PROGRESS NOTES
[address] [address]  INPATIENT PHYSICAL THERAPY  Daily Note  [facility] - 7E-55/055-A    Time In: 1030  Time Out: 1100  Timed Code Treatment Minutes: 30 Minutes  Minutes: 30          Date: 2023  Patient Name: Rita Pierre,  Gender:  female        MRN: [id number]  : 1950  (81 y.o.)     Referring Practitioner: Mayte Mauricio MD  Diagnosis: closed intertrochanteric fx, Rt  Additional Pertinent Hx: Sonia Martinez is a 73yoF with PMH of recent PE and b/l DVT, macrocytic anemia, CAD, DM2, tracheostomy dependent, obesity, and HTN who presents for leg swelling, nausea, and constipation. She was recently discharged after an 8 day admission for DVTs and PEs. She was placed on Xarelto when discharged and stated that she has been compliant and only missed one dose. She returned due to chronic constipation, feeling ''bloated'', nausea, and LE edema. While being evaluated in the ED the pt had a mechanical fall and developed a R femur fracture. She was found to have supratheraputic INR at 3.83. CT head and C-spine both clear. Pt is s/p R femur ORIF by Dr Yonathan Stacy . Pt found to have acute fractures of L2-S1 likely 2/2 osteoporosis, ok for activity as tolerated per Dr Dao Mclean . Prior Level of Function:  Lives With: Spouse  Type of Home: House  Home Layout: One level  Home Access: Ramped entrance (or 1 step with no rails.)  Home Equipment: emil Hedrick, Lift chair (Pt sleeps in lift chair and was using it to assist up to stand PTA)   Bathroom Shower/Tub: Walk-in shower  Bathroom Toilet: Standard  Bathroom Equipment: Built-in shower seat, 3-in-1 commode    ADL Assistance: Needs assistance  Homemaking Assistance: Needs assistance  Ambulation Assistance: Independent  Transfer Assistance: Independent  Active : No  Additional Comments: pt amb short distances in the home with RW,  available  and able to assist as needed.  Has a passi valve that she uses Chief Complaint   Patient presents with     Pre-Op Exam     Pre-op physical.  [facility]-phone:  [phone].       Initial /78  Pulse 76  Temp 97.7  F (36.5  C) (Tympanic)  Ht 6' (1.829 m)  Wt (!) 312 lb (141.5 kg)  BMI 42.31 kg/m2 Estimated body mass index is 42.31 kg/(m^2) as calculated from the following:    Height as of this encounter: 6' (1.829 m).    Weight as of this encounter: 312 lb (141.5 kg).  Medication Reconciliation: complete   at rest.    Restrictions/Precautions:  Restrictions/Precautions: General Precautions, Fall Risk, Weight Bearing  Right Lower Extremity Weight Bearing: Weight Bearing As Tolerated  Position Activity Restriction  Other position/activity restrictions: Tracheostomy/collar 6L. With venturi mask for out of room use 8 liters portable O2. SUBJECTIVE: Pt. Seated on her BS chair and pleasantly agrees to therapy session. Family present during session. Pt. Motivated for session. Pt. Requests to use restroom at end of session. RN notified of pt. being on commode. Pain: Not rated: R hip    Vitals:   Patient Vitals for the past 8 hrs:   BP Patient Position Temp Temp src Pulse Resp SpO2 O2 Device O2 Flow Rate (L/min)   02/22/23 0907 -- -- -- -- -- -- -- Trach mask 5 L/min   02/22/23 0839 125/80 Sitting 98.1 °F (36.7 °C) Oral 92 18 100 % Trach mask 5 L/min     *Vitals may reflect data entered into the flowsheet prior to or after PT session was completed. OBJECTIVE:  Bed Mobility:  Not Tested    Transfers:  Sit to Stand: Contact Guard Assistance, Minimal Assistance  Stand to [facility] 68, with verbal cues  To/From BS chair to WC: Minimal assistance with RW-fatigues easily and requiring cues for safety. Dependent with use of jose meliza to transfer from  Chantel Daniels 23 to [facility]. Ambulation:  None    Stairs:  None    Balance:  Static Standing Balance: Contact Guard Assistance. Pt. Stood at [employer] while PTA completed clothing management for toileting. Pt. Steady with no LOB. Pt. Also stood at RW 5x for ~10\" each attempt. Cues provided for upright posture. Pt. Fatigues quickly while standing requiring extended rest periods between each attempt. Neuromuscular Re-education  Pt. Completed pregait activity stepping fwd/retro with L LE during one standing bout using Bilateral UE support on Rolling Walker to promote R sided weight shifting and advance functional mobility.      Exercise:  None    Functional Outcome Measures:   Not completed    ASSESSMENT:  Assessment: Patient progressing toward established goals. Activity Tolerance:  Patient tolerance of  treatment: good. Equipment Recommendations:Equipment Needed: No  Other: Pt has RW, manual w/c and mechanical lift device  Discharge Recommendations: Continue to assess pending progress, Patient would benefit from continued therapy after discharge     PLAN: Current Treatment Recommendations: Strengthening, Balance training, Functional mobility training, Transfer training, Endurance training, Equipment evaluation, education, & procurement, Patient/Caregiver education & training, Safety education & training, Therapeutic activities, Home exercise program, Wheelchair mobility training, Gait training, Pain management  General Plan:  (5x/ wk 90 min)    Patient Education  Patient Education: Plan of Care, Functional Mobility, Reviewed Prior Education, Health Promotion and Wellness Education, Safety    Goals:  Patient Goals : get around o my own without the RW  Short Term Goals  Time Frame for Short Term Goals: 1 wk  Short Term Goal 1: Pt will go from supine <->sit, mod +1 to get in/out of bed. Short Term Goal 2: Pt will get up/down from bed, into JEANNETTE stedy device, +1 mod assist to progress to up to RW GOAL MET, SEE LTG  Short Term Goal 3: Pt will  the JEANNETTE stedy device, +1 min assist x5 min, to progress to standing with RW  Short Term Goal 4: Pt will propel w/c >= 50 ft, tile surface, CGA for home mobility. GOAL MET, SEE LTG  Long Term Goals  Time Frame for Long Term Goals : 3 wks from evaluation. Long Term Goal 1: Pt to go supine <->sit, min +1 to get in/out of bed. Long Term Goal 2: Pt to get up/down from bed or w/c +1 min assist to get up to walk. Long Term Goal 3: Pt to perform stand/pivot transfer with RW, +1 min assist to get in/out of w/c.   Long Term Goal 4: Pt to walk with RW >= 10 ft, mn +1 to progress to home mobility  Long Term Goal 5: Pt to propel w/c >= 50 ft, Mod I, for home mobility  Additional Goals?: Yes  Long term goal 6: Pt to get in/out of car, min +1 for transportation needs. Following session, patient left in safe position with all fall risk precautions in place.

## 2023-02-22 NOTE — PROGRESS NOTES
[address] [place] THERAPY  [address] [place]  DAILY NOTE    TIME   SLP Individual Minutes  Time In: 1430  Time Out: 1500  Minutes: 30  Timed Code Treatment Minutes: 30 Minutes       Date: 2023  Patient Name: Viviana Moon      CSN: [id number]   : 1950  (81 y.o.)  Gender: female   Referring Physician:  Alisha Quiroz MD  Diagnosis: Closed intertrochanteric fracture, right,  initial encounter  Precautions: Fall risk  Current Diet: Regular and Thin Liquids   Swallowing Strategies: Standard Universal Swallow Precautions  Date of Last MBS/FEES: Not Applicable    Pain:  46/ - Pain location: hip - RN aware      Subjective:  Patient laying in bed upon ST arrival. Agreeable to skilled ST services.  present throughout session. Patient required patient care 15 minutes into session; ST provided additional 15 minutes at a later time. Pleasant, cooperative, and engaged throughout. Short-Term Goals:  SHORT TERM GOAL #1:  Goal 1: Patient will complete functional problem solving and reasoning tasks with 80% accuracy and min cuing in order to improve completion of ADLs/IADLs at discharge.    INTERVENTIONS:   Time Management - Word Problems verbally presented  4/9 independent, 1/9 self correction, 3/9 given min cues    *patient with decreased working memory on task this date; success after given additional repetition  *patient with adequate mental math computations of time variables on task this date    Money Management - Word Problems verbally presented  6/9 independent, 3/9 given min-mod cues    *patient with decreased setup of math computations; required min-mod cuing for setup, however, good success with math computations via pen/paper this date    [address] #2:  Goal 2: Patient will complete functional immediate, working, and delayed recall tasks of 4+ units of information, with or without use of trained recall strategies, with 85% accuracy given min cues to improve retention of pertinent medical information  INTERVENTIONS:   Recall Novel Card Game - Trash  (Name, setup, how to play, how to win)  Delayed (~1 day): 4/4 independent      SHORT TERM GOAL #3:  Goal 3: Patient will complete functional thought organization and sequencing exercises with 90% accuracy and min cuing  in order to improve  completion  of  ADLs/IADLs. INTERVENTIONS: Did not address d/t focus on other goals    SHORT TERM GOAL #4:  Goal 4: Patient will complete sustained, selective, alternating, and divided attention tasks with no more than three  errors/redirections in five minutes/one task in order to allow for safe return to driving at discharge. INTERVENTIONS: Did not address d/t focus on other goals    Long-Term Goals:  Time Frame for Long Term Goals: 3 weeks    LONG TERM GOAL #1:  Goal 1: Patient will improve cognitive  functioning  to a level of modified independent in order to allow for safe return to PLOF. Comprehension: 5 - Patient understands basic needs (hungry/hot/pain)  Expression: 5 - Expresses basic ideas/needs only (hungry/hot/pain)  Social Interaction: 5 - Patient is appropriate with supervision/cues  Problem Solvin - Patient solves simple/routine tasks 75-90%+   Memory: 4 - Patient remembers 75-90%+ of the time    EDUCATION:  Learner: Patient  Education:  Reviewed ST goals and Plan of Care and Education Related to [employer] and Wellness  Evaluation of Education: [employer] understanding, Demonstrates with assistance, and Needs further instruction    ASSESSMENT/PLAN:  Activity Tolerance:  Patient tolerance of  treatment: good. Assessment/Plan: Patient progressing toward established goals. Continues to require skilled care of licensed speech pathologist to progress toward achievement of established goals and plan of care. Plan for Next Session: recall, thought organization, attention  Discharge Recommendations: [facility]     Mihai CARSON  Clinician

## 2023-02-22 NOTE — PROGRESS NOTES
[facility]  Recreational Therapy  Daily Note  [facility]    Time Spent with Patient: 10 minutes    Date:  2/22/2023       Patient Name: Kaleigh Sierra      MRN: [id number]      YOB: 1950 (72 y.o.)       Gender: female  Diagnosis: closed intertrochanteric fracture, right  Referring Practitioner: Ramesh Smith MD    RESTRICTIONS/PRECAUTIONS:  Restrictions/Precautions: General Precautions, Fall Risk, Weight Bearing     Hearing: Within functional limits    PAIN: 0    SUBJECTIVE:  Yes, if I'm still here    OBJECTIVE:  Pt would like to get her hair washed and styled by our beautician tomorrow-affect bright and appreciative          Patient Education  New Education Provided: Importance of Leisure,     Electronically signed by: Jennifer Thomas, CTRS  Date: 2/22/2023

## 2023-02-22 NOTE — PROGRESS NOTES
[address] [address]  Recreational Therapy  Daily Note  [address]    Time Spent with Patient: 10 minutes    Date:  2/22/2023       Patient Name: Vijay Keita      MRN: [id number]      YOB: 1950 (71 y.o.)       Gender: female  Diagnosis: closed intertrochanteric fracture, right  Referring Practitioner: Jian Tello MD    Patient enjoyed spending time with our pet therapy dogs.  Pt,  and daughter enjoyed petting our pet therapy dogs Ray this am-affect bright -appreciative     Electronically signed by: LIBBY Bacon  Date: 2/22/2023

## 2023-02-22 NOTE — PROGRESS NOTES
[facility]  Occupational Therapy  Daily Note  Time:   Time In: 930  Time Out: 1030  Timed Code Treatment Minutes: 60 Minutes  Minutes: 60      Date: 2023  Patient Name: Kaleigh Sierra,   Gender: female      Room: [facility]/055-A  MRN: [id number]  : 1950  (72 y.o.)  Referring Practitioner: Ramesh Smith MD  Diagnosis: closed intertrochanteric fracture, right  Additional Pertinent Hx: The patient was recently hospitalized from 2023 to 2023 initially for abdominal bloating, nausea and leg swelling.  She was found to have elevated D-dimer.  Subsequent testing revealed right lower lobe pulmonary embolism and right lower extremity DVT.  She was at initially treated with IV heparin and later transition to Xarelto.     The patient was brought to ER on 2022 because of progressively increased leg swelling, and nausea/vomiting associated with decreased oral intake.  She was found to have BNP of 256.7 and Bumex was prescribed.  She then had a fall accident  when she was going to toilet while she was in ER on 2022.  The fall resulted severe right hip pain.  X-ray of right hip and pelvis revealed acute right femoral intertrochanteric fracture with subtrochanteric extension.  Orthopedic service was consulted.  Xarelto was held in preparation for surgical management.  Cardiology was consulted on 2023 for elevated BNP and Lasix was started.  Because of difficulty voiding with urinary retention, urology was also consulted on 2023.  Urology service placed Wen with some difficulty on 2023.  Therefore Wen was kept in place.  On 2/3/2023 the patient underwent open treatment of right intertrochanteric fracture with cephalomedullary nail by Dr. Vin Aragon.  She is allowed weightbearing as tolerated at the right lower extremity postoperatively.    Restrictions/Precautions:  Restrictions/Precautions: General Precautions, Fall Risk, Weight  Bearing  Right Lower Extremity Weight Bearing: Weight Bearing As Tolerated  Position Activity Restriction  Other position/activity restrictions: Tracheostomy/collar 6L. With venturi mask for out of room use 8 liters portable O2. SUBJECTIVE: Patinet cooperative. Agreeable to OT. Collaborate yady PT re: showering routine. Pt showers at home sometimes, and uses a trach apron to cover trach. Pt doesn't have one here and requests to work on sponge bathing only. At EOS, [facility]. PAIN: 8/10: R hip     Vitals: Vitals not assessed per clinical judgement, see nursing flowsheet    COGNITION: WFL and Decreased Insight    ADL:   Grooming: with set-up. Oral care and hair care in chair   Bathing: Minimal Assistance. A with bottom, CGA for balance in [facility]. Upper Extremity Dressing: with set-up. Donning overhead gown. Lower Extremity Dressing: Maximum Assistance and X 1. For Depends. Footwear Management: Maximum Assistance. Toileting: Minimal Assistance. A for bowel hygiene   Toilet Transfer: Contact Guard Assistance. BSC . BALANCE:  Sitting Balance:  Modified Independent. Standing Balance: Contact Guard Assistance. Within ADLs within [facility]     BED MOBILITY:  Not Tested    TRANSFERS:  Sit to Stand:  Contact Guard Assistance. VIRGIL Snyder. Stand to Sit: Contact Guard Assistance. ASSESSMENT:     Activity Tolerance:  Patient tolerance of  treatment: Good treatment tolerance      Discharge Recommendations: 24 hour assistance or supervision and Not safe to return home at this time  Equipment Recommendations: Other: Monitor pending progress  Plan: Times Per Week: 5x per week for 90 minutes  Times Per Day:  Once a day  Current Treatment Recommendations: Balance training, Functional mobility training, Endurance training, Self-Care / ADL, Safety education & training, Patient/Caregiver education & training, Strengthening    Patient Education  Patient Education: ADL's, Energy Conservation, Equipment Education, Reviewed Prior Education, and Home Safety    Goals  Short Term Goals  Time Frame for Short Term Goals: until discharge  Short Term Goal 1: Pt will tolerate 12-15 min EOB ADL task with S to improve trunk control and activity tolerance required for EOB ADLs. Short Term Goal 2: Pt will complete sit-stand t/fs with Min A x 1 with minimal cues of technique to progress towards BSC t/fs  Short Term Goal 3: Pt will tolerate standing 3 min with CGA for increased ease of clothing management nad LB bathing routine  Short Term Goal 4: Pt will safely navigate short distances with no > than Min A x1 and min VC for safety to increase indep with ADLs  Long Term Goals  Time Frame for Long Term Goals : 2 weeks from IPR OT eval  Long Term Goal 1: Pt will complete sit-stand t/fs with SBA x 1 with minimal cues of technique to progress towards BSC t/fs  Long Term Goal 2: Pt will perform UB/LB dressing and bathing with Min A and minimal cues for ECT to improve functional independence. Following session, patient left in safe position with all fall risk precautions in place.

## 2023-02-22 NOTE — PROGRESS NOTES
Physical Medicine & Rehabilitation Progress Note    Chief Complaint:  Right hip fracture, low back compression fracture    Subjective:    Adrian Radford is a 81 y.o. 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 The patient previously was admitted to inpatient rehab service from 2022 to 2022 due to critical care myopathy and debility/physical decondition as result of COVID-19 pneumonia. She was discharged to home with referral for home care service on 2022. The patient developed progressive difficulty breathing in 2022 and was found to have glottic stenosis and eventually received tracheostomy by Dr. Preethi Kimball on 2022. The patient's  says the patient also developed progressively worsening lower back pain in summer 2022 and saw orthopedics surgeon at [facility]. The patient has been says the patient was diagnosed of having \"lumbar spine stress fracture\". No surgical intervention was performed. She was treated with brace which she later abandoned due to discomfort associate with brace usage.      The patient was recently hospitalized from 2023 to 2023 initially for abdominal bloating, nausea and leg swelling. She was found to have elevated D-dimer. Subsequent testing revealed right lower lobe pulmonary embolism and right lower extremity DVT. She was at initially treated with IV heparin and later transition to [address] [place] [address]. The patient was brought to ER on 2/1/2022 because of progressively increased leg swelling, and nausea/vomiting associated with decreased oral intake. She was found to have BNP of 256.7 and Bumex was prescribed. She then had a fall accident  when she was going to toilet while she was in ER on 2/1/2022. The fall resulted severe right hip pain. X-ray of right hip and pelvis revealed acute right femoral intertrochanteric fracture with subtrochanteric extension. Orthopedic service was consulted. Xarelto was held in preparation for surgical management. Cardiology was consulted on 2/2/2023 for elevated BNP and Lasix was started. Because of difficulty voiding with urinary retention, urology was also consulted on 2/2/2023. Urology service placed Wen with some difficulty on 2/2/2023. Therefore Wen was kept in place. On 2/3/2023 the patient underwent open treatment of right intertrochanteric fracture with cephalomedullary nail by Dr. Nava Underwood. She is allowed weightbearing as tolerated at the right lower extremity postoperatively. PM&R was consulted on 2/5/2023. During the evaluation the patient's  said that the patient began having progressively worsened low back pain starting in summer 2022 began interfering the patient's daily function. Therefore she is sore orthopedic physician at [facility]. Imaging tests were done and the patient was told that she had \" stress fracture\" in her spine. She was provided with a lumbar spine brace to wear but she was not compliant with spine brace application because of discomfort it produced while she was wearing it.   Because no outside lumbar spine images study report was available for review, x-ray of lumbar spine was ordered and performed on 2/6/2023 and showed moderate vertebral body spondylosis in the lumbar and lower thoracic spine, moderate degenerative facet arthropathy involving at least lower 3 lumbar levels, lumbar spine osteoporosis, mild superior endplate depression fracture at L2, L3 and L4. Lumbar spine MRI was recommended by radiologist.  Therefore primary team ordered lumbar spine MRI which was performed this afternoon revealing acute fracture with a fluid cleft associated with endplate at V7-G5 levels with mild height loss at each vertebral bodies. The patient was found to have severe anemia with Hgb/Hct dropped from 7.3/24 on 2/4/2023 down to 5.1/16.9 on 2/6/2023. Therefore she was given blood transfusion for few units. CT of abdomen and pelvis was ordered to rule out possible intra-abdominal source of blood loss. CT abdomen and pelvis done on 2/8/2023 show only pericolonic inflammation with diverticula related to acute diverticulitis, acute on chronic compression fracture of L2, L3, L4 and L5, mild, and marked osteopenia. The patient's hospital course also complicated by intermittent confusion, and development of gluteus region skin breakdown. The patient says she feels well. Her appetite is slightly better. She says Maalox usage helped to reduce the gassy sensation. She denies having abdominal pain at the present time. She denies having nausea or vomiting. Diarrhea has stopped. She continues having pain at her right side lower back, right hip and right groin area. Voltaren gel application continues to provide temporary pain reduction effect. She did not use any tramadol yesterday. She still has weakness at her right lower extremity but she says the muscle strength is getting stronger. She is tolerating the intensive rehab therapy treatment well. The patient is projected to be discharged on 2/27/2023      Rehabilitation:  PT: Reviewed.     Bed Mobility:  Rolling to Left: Minimal Assistance   Rolling to Right: Moderate Assistance with rail assist.  Assist needed to further advance hips/ pelvis  Supine to Sit: Moderate Assistance with rail assist.    Scooting: Moderate Assistance    Transfers:  Sit to Stand: Minimal Assistance, from edge of bed up to RW.  2 trials. Stand to Sit:Moderate Assistance, X 1  [address] [facility] with walker from R Chantel [address] 23 to  chair. Pt. With forward flexed posture, relying heavily on walker. To/From [facility] chair to WC: Minimal assistance with RW-fatigues easily and requiring cues for safety. Dependent with use of jose stedy to transfer from R Chant[address] to commode. Ambulation:  Moderate Assistance, X 2  Distance: 4 ft  Surface: Level Tile  Device:Rolling Walker  Gait Deviations: Forward Flexed Posture, Slow Haylee, Decreased Step Length Bilaterally, Decreased Gait Speed, and Pt wt bears on LT forearm on walker. Mod assist needed of 1 to control RW as pt tends to push it too far ahead. Assist of another for balance with mod cues to encouragement and to fully align to next seated surface. Balance:  Static Sitting Balance:  Supervision, edge of bed  Static Standing Balance: Minimal Assistance, with RW support while therapist donning pt's pull ups. Pt stood approx 1 min for this   Dynamic Standing Balance: Contact Guard Assistance, Minimal Assistance  Pt. Stood multiple times in [facility] while assisting with pulling up depends and while PTA completed pericare. Pt steady overall but limited by fatigue. Neuromuscular Re-education  Pt. Completed pregait activity stepping fwd/retro with L LE during one standing bout using Bilateral UE support on Rolling Walker to promote R sided weight shifting and advance functional mobility. OT: Reviewed. ADL:   Grooming: with set-up. To don Deoderant  Bathing: Minimal Assistance. A with bottom, CGA for balance in [facility]. Upper Extremity Dressing: with set-up.   To don nightgown  Lower Extremity Dressing: Maximum Assistance and X 1.  For Depends.   Footwear Management: Maximum Assistance.     Toileting: Dependent as two assist required Max A x1  To assist with flaquito care x2 trials and clothing management while second assist of CGA x1 for physical assistance required with cues for upright positioning.   Toilet Transfer: Min A x2 during multiple trials as rest breaks required in between flaquito care and clothing management as well as two urgencies to have BM.      BALANCE:  Sitting Balance:  Supervision.    Standing Balance: Contact Guard Assistance, X 1. To CGA x2 varied     BED MOBILITY:  Not Tested     TRANSFERS:  Sit to Stand:  Minimal Assistance, X 1. +CGA x1   Stand to Sit: Minimal Assistance, X 1. +CGA x1  With use of RW, cues required to control ascend/descend.  Completed more than one trial during session.      FUNCTIONAL MOBILITY:  Assistive Device: Rolling Walker  Assist Level:  Minimal Assistance and X 2.   Distance: in pivoting directions (from bedside chair to commode, commode to w/c (x2 trials), and w/c to commode (x2 trials)).    ADDITIONAL ACTIVITIES:  Pt. Provided with 2# weighted dowel mike with instruction to complete 15 reps in all available joints/planes with occ cues for proper technique/pace, task performed to improve pt's performance with daily transfers. Task completed in supine with HOB slightly elevated.  OTR provided education to pt, pt's daughter and significant other in regards to current level of assistance required to carryout daily occupations and d/c planning, all attentive and verbalized understanding.       ST: Reviewed.    Time Management - Word Problems verbally presented  4/9 independent, 1/9 self correction, 3/9 given min cues     *patient with decreased working memory on task this date; success after given additional repetition  *patient with adequate mental math computations of time variables on task this date    Novel SuperTV Management - [employer]  4/7  independent, 3/7 given min cues     *patient with decreased attention to detail and math computations via pen/paper on this date; required cuing to include all prices in math computations and to check computations for accuracy     Recall Novel Card Game - Trash  (Name, setup, how to play, how to win)  Delayed (~1 day): 4/4 independent    To-Do List  (Plant flowers, make dinner, facebook, grocery store, sweep, dishes)  Immediate recall: 5/6 independent, 1/6 given categorical cue  Delayed (20 min) recall: 3/6 independent, 3/6 with utilization of written list     *patient utilized write it down and repeat it; list written on notepad in patient's room     Divided Attention  Patient instructed to sort cards by suit while listening for and counting ST tapping leg. *patient completed task in 1:55 with x1 self correction; ST with x8 leg taps; patient ID x9 leg taps     Patient instructed to sort suits in ascending order     *patient sorted x4 suits in 4:20 with no errors  *good thought organization and sequencing with task this date      Review of Systems:  Review of Systems   Constitutional:  Positive for appetite change. Negative for chills, diaphoresis, fatigue and fever. HENT:  Negative for hearing loss, rhinorrhea, sneezing, sore throat and trouble swallowing. Eyes:  Negative for visual disturbance. Respiratory:  Negative for cough, shortness of breath and wheezing. Cardiovascular:  Negative for chest pain and palpitations. Gastrointestinal:  Negative for abdominal distention, abdominal pain, constipation, diarrhea, nausea and vomiting. Genitourinary:  Negative for dysuria. Musculoskeletal:  Positive for arthralgias (Bilateral hip, right groin, and right thigh), back pain (Right-sided lower back), gait problem and myalgias (Right thigh). Negative for neck pain. Skin:  Negative for rash.    Neurological:  Positive for weakness (Bilateral lower extremities especially on the right side) and numbness (Bilateral feet). Negative for dizziness, tremors, seizures, speech difficulty, light-headedness and headaches. Psychiatric/Behavioral:  Negative for dysphoric mood, hallucinations and sleep disturbance. The patient is not nervous/anxious.          Objective:  /60   Pulse 90   Temp 98.3 °F (36.8 °C) (Oral)   Resp 20   Ht 5' 2\" (1.575 m)   Wt 218 lb 1.6 oz (98.9 kg)   LMP  (LMP Unknown)   SpO2 92%   BMI 39.89 kg/m²   Physical Exam   General:  well-developed, well nourished morbid obese  female ; in no acute distress ; appropriate affect & mood; sitting on reclining chair comfortably; receiving humidified oxygen supplement via trach collar; speaking in whispered manner due to presence of tracheostomy  Eyes: pupil equally round ; extra-ocular motion intact bilaterally  Head, Ear, Nose, Mouth & Throat : normocephalic ; no discharge from ears or nose ; no deformity ; no facial swelling ; oral mucosa pink  Neck :  supple ; presence of tracheostomy at anterior neck; no tenderness; mild muscle spasm at upper trapezius muscle  Cardiovascular : regular rate & rhythm ; normal S1 & S2 heart sound ; no murmur ; normal peripheral pulse at bilateral upper & lower extremities  Pulmonary : Breath sounds present at bilateral lung fields; no wheezing ; no rale; no crackle  Gastrointestinal : soft, protruded abdomen; no tenderness; normal bowel sound present    : Wen catheter in place draining light yellowish urine  Back : no tenderness ; no muscle spasm  Skin: no skin lesion or rash ; no pitting edema at bilateral upper extremities; 1+ to 2+ pitting edema at bilateral legs; presence of healed surgical scar at right upper lateral thigh and hip  Musculoskeletal : no limb asymmetry; no limb deformity; mild tenderness at right lateral hip; no tenderness at bilateral upper extremities & the rest of bilateral lower extremities; no palpable mass at limbs ; right hip and right knee joint laxity not assessed; no joints laxity or crepitation at other limb joints; shoulder flexion and abduction passive ROM reaching 160 degrees bilaterally; right hip flexion passive ROM reaching 70 degrees and right knee flexion passive ROM reaching 80 degrees limited by pain at right hip and thigh; left hip flexion passive ROM reaching 90 degrees; ankle dorsiflexion passive ROM reaching 0 degrees bilaterally; otherwise normal functional joints ROM at the rest of bilateral upper & lower extremities  Cerebral :  alert ; awake ; oriented to place, person and time; follow 1-2 steps verbal command  Cerebellum : no dysmetria with bilateral finger-to-nose test ; unable to perform heel-to-shin test on the right side; dysmetria with left heel-to-shin test  Cranial nerve : CN II to XII function grossly intact  Sensory : intact light touch and pin prick sensation at bilateral upper extremities; reduced light touch and pinprick sensation at distal bilateral lower extremities from upper leg region downward in sock distribution  Motor : normal tone at bilateral upper & left lower extremities ; muscle tone not assessed on right lower extremity due to the pain; 2-/5 muscle strength at right hip flexion; 2-/5 to 2+/5 muscle strength at right hip abduction and adduction; 4+/5 muscle strength at the left hip flexion; 4-/5 muscle strength at right knee extension ; 3+/5 muscle strength at the right knee flexion; otherwise 5/5 muscle strength at the rest of bilateral upper and the lower extremities  Reflex : 1+ bilateral brachioradialis reflexes; 0 bilateral biceps and bilateral knees reflexes   Pathological Reflex :  No Roberta's sign ; no ankle clonus  Gait : Not assessed      Diagnostics:   Recent Results (from the past 24 hour(s))   POCT glucose    Collection Time: 02/23/23  6:59 AM   Result Value Ref Range    POC Glucose 98 70 - 108 mg/dl       Impression:  Right femoral intertrochanteric fracture with subtrochanteric extension due to fall requiring open reduction and internal fixation with cephalomedullary nail  Glottic stenosis requiring tracheostomy  Persistent chronic low back pain due to lumbar and lower thoracic spine spondylosis with degenerative facet arthropathy at the lower 3 lumbar levels, and acute L2-S1 endplate fractures  Right posterior tibial, left greater saphenous, left peroneal and left anterior tibial veins deep vein thrombosis, and right lower lobe pulmonary embolism  Decreased appetite, nausea and diarrhea most likely due to viral gastroenteritis  Acute anemia requiring blood transfusion  Urinary retention due to urethra obstruction requiring Wen cath placement   Candida albicans UTI  Lumbar spine osteoporosis  Gluteal region decubitus ulcer  Diabetes mellitus type 2 with poor blood glucose control and complicated by bilateral lower extremities diabetic polyneuropathy  Morbid obesity  Hypertension  Hyperlipidemia  History of lower back pain with history of \"lumbar stress fracture\"  History of COVID infection with pneumonia  History of mild cognitive impairment  History of coronary artery disease requiring coronary artery stenting  Grade 1 left ventricular diastolic dysfunction with preserved ejection fraction     The patient's condition is better. Diarrhea, nausea and abdominal discomfort has resolved. Her appetite is improving slightly. She continues having pain at her right side low back, right hip and right groin area which is controlled with just Voltaren gel application and Tylenol usage. She still has significant weakness at her right hip and knee but the muscle strength seem to improve slowly. She continues tolerating the intensive inpatient rehabilitation treatment well. Her function is improving very slowly. SNF subacute rehab program placement as discharge plan has been recommended so she can continue receiving daily rehabilitation intervention for longer period of time.     Currently the patient is projected to be ready for discharge on 2/27/2023      Plan:  Continues intensive PT/OT/SLP/RT inpatient rehabilitation program at least 3 hours per day, 5 days per week in order to improve functional status prior to discharge. Family education and training will be completed. Equipment evaluations and recommendations will be completed as appropriate. Rehabilitation nursing continues to be involved for bowel, bladder, skin, and pain management. Nursing will also provide education and training to patient and family. Prophylaxis:  DVT: Patient on Xarelto, KENA stocking, intermittent pneumatic compression device.   GI: Colace, Enemeez enema, GlycoLax, Senokot, Movantik, lactulose as needed, milk of magnesium as needed, lactobacillus daily, Imodium as needed for diarrhea, simethicone as needed, Maalox as needed  Pain: Tylenol, Voltaren gel as needed, tramadol as needed  Continue aspirin for coronary artery disease  Continue Lasix for lower extremities edema  Continue Lantus insulin, Humalog insulin, Humalog insulin coverage for diabetes mellitus  Continue Seroquel for anxiety and insomnia   Continue Xarelto for bilateral DVT and pulmonary embolism  Continue Toprol-XL for hypertension  Continue Crestor for hyperlipidemia  Continue melatonin, trazodone as needed for insomnia  Continue Protonix for gastric protection  Continue simethicone as needed for indigestion  Continue multivitamin and ferrous sulfate for anemia  Continue with Diflucan 200 mg daily for 2 weeks for Candida albicans yeast UTI (started on 2/15/2023; end on 2/28/2023)  Continues Megace to stimulate her appetite  Nutrition: Continue current diet  Bladder: Monitoring signs or symptoms of UTI  Bowel: Monitoring signs or symptoms of constipation   and case management for coordination of care and discharge planning      Missed Therapy Time:  None      Bela Chavira MD

## 2023-02-22 NOTE — PROGRESS NOTES
Patient: Joce Bonilla  Unit/Bed: 6Q-41/090-F  YOB: 1950  MRN: [id number] Acct: [de-identified]   Admitting Diagnosis: Closed intertrochanteric fracture, right, initial encounter [facility]) Josephine Garcia  Admit Date:  2/10/2023  Hospital Day: 11    Assessment:     Principal Problem:    Closed intertrochanteric fracture, right, initial encounter [facility])  Active Problems:    S/P tracheoplasty    Pulmonary embolism on right ([address]    Deep vein thrombosis (DVT) of both lower extremities ([facility]    Hyperlipidemia    Compression of lumbar vertebra ([facility]    Anemia associated with acute blood loss    Osteoporosis of lumbar spine    Debility    Physical deconditioning    Essential hypertension    Type 2 diabetes mellitus with insulin therapy ([facility]    Morbid obesity with BMI of 40.0-44.9, adult ([address]  Resolved Problems:    * No resolved hospital problems. *      Plan:     Use some phenergan prn nausea  Increase the lasix for the continued edema        Subjective:     Patient has no complaint of CP or SOB. She states that the edema has not changed since admission. .   Medication side effects: none    Scheduled Meds:   lactobacillus  1 capsule Oral Daily with breakfast    insulin glargine  26 Units SubCUTAneous Nightly    diclofenac sodium  2 g Topical 4x daily    fluconazole  200 mg Oral Daily    pantoprazole  40 mg Oral QAM AC    traZODone  100 mg Oral Nightly    rivaroxaban  20 mg Oral Daily    multivitamin  1 tablet Oral Daily    rosuvastatin  10 mg Oral Daily    senna  2 tablet Oral Nightly    sodium chloride flush  5-40 mL IntraVENous 2 times per day    furosemide  20 mg Oral Daily    acetaminophen  650 mg Oral Q6H    docusate sodium  100 mg Oral BID    aspirin  81 mg Oral Daily    docusate sodium  1 enema Rectal Daily    folic acid  1 mg Oral Daily    metoprolol succinate  25 mg Oral Daily    naloxegol  12.5 mg Oral QAM AC    polyethylene glycol  17 g Oral Daily    melatonin  6 mg Oral Nightly    QUEtiapine  25 mg Oral Nightly    miconazole   Topical BID     Continuous Infusions:   sodium chloride      dextrose       PRN Meds:loperamide, simethicone, traMADol **OR** traMADol, diclofenac sodium, albuterol sulfate HFA, Menthol, guaiFENesin, sodium chloride flush, sodium chloride, potassium chloride **OR** potassium alternative oral replacement **OR** potassium chloride, magnesium sulfate, glucose, dextrose bolus **OR** dextrose bolus, glucagon (rDNA), dextrose, acetaminophen, bisacodyl, magnesium hydroxide, ondansetron, polyethylene glycol, lactulose    Review of Systems  Pertinent items are noted in HPI. Objective:     No data found. I/O last 3 completed shifts: In: 36 [P.O.:720; I.V.:10]  Out: 750 [Urine:750]  No intake/output data recorded.     /66   Pulse 92   Temp 98.2 °F (36.8 °C) (Oral)   Resp 20   Ht 5' 2\" (1.575 m)   Wt 222 lb 14.4 oz (101.1 kg)   LMP  (LMP Unknown)   SpO2 92%   BMI 40.77 kg/m²     General appearance: alert, appears stated age, and cooperative  Head: Normocephalic, without obvious abnormality, atraumatic  Lungs: clear to auscultation bilaterally  Heart: regular rate and rhythm, S1, S2 normal, no murmur, click, rub or gallop  Abdomen: soft, non-tender; bowel sounds normal; no masses,  no organomegaly  Extremities: edema 1-2+ bilaterally  Skin: Skin color, texture, turgor normal. No rashes or lesions  Neurologic:  weak    Electronically signed by Micaela Mena MD on 2/21/2023 at 8:23 PM

## 2023-02-22 NOTE — PLAN OF CARE
Problem: Discharge Planning  Goal: Discharge to home or other facility with appropriate resources  2/22/2023 1556 by FAITH Claire  Note: SW met with patient's spouse, Roel Naranjo, to follow up discharge planning. Roel Naranjo reported wanting a referral to be made to [facility]. SW left a message with Valencia Kapoor at [facility] to initiate a referral.    SW spoke with Valencia Kapoor to make referral. Referral information faxed on this date. SW will follow and maintain involvement in discharge planning.

## 2023-02-22 NOTE — PROGRESS NOTES
Patient: Dariela Mcguire  Unit/Bed: 4G-82/893-Q  YOB: 1950  MRN: [id number] Acct: [de-identified]   Admitting Diagnosis: Closed intertrochanteric fracture, right, initial encounter [facility]) Josi Matias  Admit Date:  2/10/2023  Hospital Day: 12    Assessment:     Principal Problem:    Closed intertrochanteric fracture, right, initial encounter [facility])  Active Problems:    S/P tracheoplasty    Pulmonary embolism on right ([address]    Deep vein thrombosis (DVT) of both lower extremities ([facility]    Hyperlipidemia    Compression of lumbar vertebra ([facility]    Anemia associated with acute blood loss    Osteoporosis of lumbar spine    Debility    Physical deconditioning    Essential hypertension    Type 2 diabetes mellitus with insulin therapy ([facility]    Morbid obesity with BMI of 40.0-44.9, adult ([facility]  Resolved Problems:    * No resolved hospital problems. *      Plan:     Continue to follow   The CS are borderline low still so will lower the bedtime lantus        Subjective:     Patient has no complaint of CP or SOB. .   Medication side effects: none    Scheduled Meds:   ferrous sulfate  325 mg Oral Daily with breakfast    potassium chloride  16 mEq Oral BID    megestrol  40 mg Oral Daily    lactobacillus  1 capsule Oral Daily with breakfast    furosemide  40 mg Oral Daily    insulin glargine  26 Units SubCUTAneous Nightly    diclofenac sodium  2 g Topical 4x daily    fluconazole  200 mg Oral Daily    pantoprazole  40 mg Oral QAM AC    traZODone  100 mg Oral Nightly    rivaroxaban  20 mg Oral Daily    multivitamin  1 tablet Oral Daily    rosuvastatin  10 mg Oral Daily    senna  2 tablet Oral Nightly    sodium chloride flush  5-40 mL IntraVENous 2 times per day    acetaminophen  650 mg Oral Q6H    docusate sodium  100 mg Oral BID    aspirin  81 mg Oral Daily    docusate sodium  1 enema Rectal Daily    folic acid  1 mg Oral Daily    metoprolol succinate  25 mg Oral Daily    melatonin  6 mg Oral Nightly    QUEtiapine  25 mg Oral Nightly    miconazole   Topical BID     Continuous Infusions:   sodium chloride      dextrose       PRN Meds:loperamide, promethazine, simethicone, traMADol **OR** traMADol, diclofenac sodium, albuterol sulfate HFA, Menthol, guaiFENesin, sodium chloride flush, sodium chloride, potassium chloride **OR** potassium alternative oral replacement **OR** potassium chloride, magnesium sulfate, glucose, dextrose bolus **OR** dextrose bolus, glucagon (rDNA), dextrose, acetaminophen, bisacodyl, magnesium hydroxide, polyethylene glycol, lactulose    Review of Systems  Pertinent items are noted in HPI. Objective:     Patient Vitals for the past 8 hrs:   BP Temp Pulse Resp SpO2 Weight   02/22/23 0839 125/80 98.1 °F (36.7 °C) 92 18 100 % --   02/22/23 0612 -- -- -- -- -- 219 lb 1.6 oz (99.4 kg)     I/O last 3 completed shifts: In: 36 [P.O.:720;  I.V.:10]  Out: 950 [Urine:950]  I/O this shift:  In: 337 [P.O.:337]  Out: -     /80   Pulse 92   Temp 98.1 °F (36.7 °C)   Resp 18   Ht 5' 2\" (1.575 m)   Wt 219 lb 1.6 oz (99.4 kg)   LMP  (LMP Unknown)   SpO2 100%   BMI 40.07 kg/m²     General appearance: alert, appears stated age, and cooperative  Head: Normocephalic, without obvious abnormality, atraumatic  Lungs: clear to auscultation bilaterally  Heart: regular rate and rhythm, S1, S2 normal, no murmur, click, rub or gallop  Abdomen: soft, non-tender; bowel sounds normal; no masses,  no organomegaly  Extremities: edema 1-2+ bilaterally  Skin: Skin color, texture, turgor normal. No rashes or lesions  Neurologic:  weak    Data Review:    Latest Reference Range & Units 2/17/23 06:59 2/20/23 07:39 2/21/23 08:31 2/22/23 07:40   POC Glucose 70 - 108 mg/dl 98 92 99 85       Electronically signed by Sintia Ahmadi MD on 2/22/2023 at 9:14 AM

## 2023-02-23 LAB — GLUCOSE BLD STRIP.AUTO-MCNC: 98 MG/DL (ref 70–108)

## 2023-02-23 PROCEDURE — 2580000003 HC RX 258: Performed by: PHYSICAL MEDICINE & REHABILITATION

## 2023-02-23 PROCEDURE — 94760 N-INVAS EAR/PLS OXIMETRY 1: CPT

## 2023-02-23 PROCEDURE — 97535 SELF CARE MNGMENT TRAINING: CPT

## 2023-02-23 PROCEDURE — 97130 THER IVNTJ EA ADDL 15 MIN: CPT

## 2023-02-23 PROCEDURE — 97110 THERAPEUTIC EXERCISES: CPT

## 2023-02-23 PROCEDURE — 6370000000 HC RX 637 (ALT 250 FOR IP): Performed by: NURSE PRACTITIONER

## 2023-02-23 PROCEDURE — 82948 REAGENT STRIP/BLOOD GLUCOSE: CPT

## 2023-02-23 PROCEDURE — 6370000000 HC RX 637 (ALT 250 FOR IP): Performed by: FAMILY MEDICINE

## 2023-02-23 PROCEDURE — 6370000000 HC RX 637 (ALT 250 FOR IP): Performed by: PHYSICAL MEDICINE & REHABILITATION

## 2023-02-23 PROCEDURE — 97530 THERAPEUTIC ACTIVITIES: CPT

## 2023-02-23 PROCEDURE — 97542 WHEELCHAIR MNGMENT TRAINING: CPT

## 2023-02-23 PROCEDURE — 1180000000 HC REHAB R&B

## 2023-02-23 PROCEDURE — 2700000000 HC OXYGEN THERAPY PER DAY

## 2023-02-23 PROCEDURE — 99232 SBSQ HOSP IP/OBS MODERATE 35: CPT | Performed by: PHYSICAL MEDICINE & REHABILITATION

## 2023-02-23 PROCEDURE — 97129 THER IVNTJ 1ST 15 MIN: CPT

## 2023-02-23 RX ORDER — METOLAZONE 2.5 MG/1
2.5 TABLET ORAL DAILY
Status: DISCONTINUED | OUTPATIENT
Start: 2023-02-23 | End: 2023-03-02 | Stop reason: HOSPADM

## 2023-02-23 RX ORDER — INSULIN GLARGINE 100 [IU]/ML
20 INJECTION, SOLUTION SUBCUTANEOUS NIGHTLY
Status: DISCONTINUED | OUTPATIENT
Start: 2023-02-23 | End: 2023-03-02 | Stop reason: HOSPADM

## 2023-02-23 RX ADMIN — MICONAZOLE NITRATE: 20 POWDER TOPICAL at 08:18

## 2023-02-23 RX ADMIN — TRAZODONE HYDROCHLORIDE 100 MG: 100 TABLET ORAL at 21:21

## 2023-02-23 RX ADMIN — ROSUVASTATIN 10 MG: 10 TABLET, FILM COATED ORAL at 21:21

## 2023-02-23 RX ADMIN — ACETAMINOPHEN 650 MG: 325 TABLET ORAL at 08:16

## 2023-02-23 RX ADMIN — MEGESTROL ACETATE 40 MG: 40 TABLET ORAL at 08:17

## 2023-02-23 RX ADMIN — RIVAROXABAN 20 MG: 20 TABLET, FILM COATED ORAL at 17:06

## 2023-02-23 RX ADMIN — Medication 6 MG: at 21:28

## 2023-02-23 RX ADMIN — MICONAZOLE NITRATE: 20 POWDER TOPICAL at 21:26

## 2023-02-23 RX ADMIN — TRAMADOL HYDROCHLORIDE 100 MG: 50 TABLET, COATED ORAL at 13:45

## 2023-02-23 RX ADMIN — ACETAMINOPHEN 650 MG: 325 TABLET ORAL at 15:30

## 2023-02-23 RX ADMIN — ACETAMINOPHEN 650 MG: 325 TABLET ORAL at 02:42

## 2023-02-23 RX ADMIN — PANTOPRAZOLE SODIUM 40 MG: 40 TABLET, DELAYED RELEASE ORAL at 08:17

## 2023-02-23 RX ADMIN — SODIUM CHLORIDE, PRESERVATIVE FREE 10 ML: 5 INJECTION INTRAVENOUS at 08:19

## 2023-02-23 RX ADMIN — DICLOFENAC SODIUM 2 G: 10 GEL TOPICAL at 21:39

## 2023-02-23 RX ADMIN — DICLOFENAC SODIUM 2 G: 10 GEL TOPICAL at 12:07

## 2023-02-23 RX ADMIN — ASPIRIN 81 MG 81 MG: 81 TABLET ORAL at 08:17

## 2023-02-23 RX ADMIN — SODIUM CHLORIDE, PRESERVATIVE FREE 10 ML: 5 INJECTION INTRAVENOUS at 21:32

## 2023-02-23 RX ADMIN — METOPROLOL SUCCINATE 25 MG: 25 TABLET, FILM COATED, EXTENDED RELEASE ORAL at 08:15

## 2023-02-23 RX ADMIN — FERROUS SULFATE TAB 325 MG (65 MG ELEMENTAL FE) 325 MG: 325 (65 FE) TAB at 08:16

## 2023-02-23 RX ADMIN — QUETIAPINE FUMARATE 25 MG: 25 TABLET ORAL at 21:23

## 2023-02-23 RX ADMIN — ACETAMINOPHEN 650 MG: 325 TABLET ORAL at 21:22

## 2023-02-23 RX ADMIN — DICLOFENAC SODIUM 2 G: 10 GEL TOPICAL at 08:18

## 2023-02-23 RX ADMIN — FUROSEMIDE 40 MG: 40 TABLET ORAL at 08:17

## 2023-02-23 RX ADMIN — TRAMADOL HYDROCHLORIDE 100 MG: 50 TABLET, COATED ORAL at 21:20

## 2023-02-23 RX ADMIN — Medication 1 TABLET: at 08:16

## 2023-02-23 RX ADMIN — DICLOFENAC SODIUM 2 G: 10 GEL TOPICAL at 17:06

## 2023-02-23 RX ADMIN — METOLAZONE 2.5 MG: 2.5 TABLET ORAL at 21:29

## 2023-02-23 RX ADMIN — FLUCONAZOLE 200 MG: 200 TABLET ORAL at 08:17

## 2023-02-23 RX ADMIN — INSULIN GLARGINE 20 UNITS: 100 INJECTION, SOLUTION SUBCUTANEOUS at 21:32

## 2023-02-23 RX ADMIN — FOLIC ACID 1 MG: 1 TABLET ORAL at 08:16

## 2023-02-23 RX ADMIN — Medication 1 CAPSULE: at 08:16

## 2023-02-23 ASSESSMENT — PAIN DESCRIPTION - DESCRIPTORS
DESCRIPTORS: ACHING

## 2023-02-23 ASSESSMENT — PAIN DESCRIPTION - ORIENTATION
ORIENTATION: RIGHT

## 2023-02-23 ASSESSMENT — PAIN DESCRIPTION - LOCATION
LOCATION: HIP

## 2023-02-23 ASSESSMENT — PAIN SCALES - GENERAL
PAINLEVEL_OUTOF10: 10
PAINLEVEL_OUTOF10: 8
PAINLEVEL_OUTOF10: 8
PAINLEVEL_OUTOF10: 4
PAINLEVEL_OUTOF10: 10
PAINLEVEL_OUTOF10: 8

## 2023-02-23 ASSESSMENT — ENCOUNTER SYMPTOMS
TROUBLE SWALLOWING: 0
BACK PAIN: 1
CONSTIPATION: 0
WHEEZING: 0
NAUSEA: 0
ABDOMINAL PAIN: 0
SHORTNESS OF BREATH: 0
RHINORRHEA: 0
SORE THROAT: 0
DIARRHEA: 0
COUGH: 0
VOMITING: 0
ABDOMINAL DISTENTION: 0

## 2023-02-23 ASSESSMENT — PAIN DESCRIPTION - ONSET
ONSET: ON-GOING
ONSET: ON-GOING

## 2023-02-23 ASSESSMENT — PAIN DESCRIPTION - FREQUENCY
FREQUENCY: CONTINUOUS
FREQUENCY: CONTINUOUS

## 2023-02-23 ASSESSMENT — PAIN - FUNCTIONAL ASSESSMENT
PAIN_FUNCTIONAL_ASSESSMENT: ACTIVITIES ARE NOT PREVENTED
PAIN_FUNCTIONAL_ASSESSMENT: ACTIVITIES ARE NOT PREVENTED
PAIN_FUNCTIONAL_ASSESSMENT: PREVENTS OR INTERFERES SOME ACTIVE ACTIVITIES AND ADLS
PAIN_FUNCTIONAL_ASSESSMENT: ACTIVITIES ARE NOT PREVENTED

## 2023-02-23 ASSESSMENT — PAIN DESCRIPTION - PAIN TYPE
TYPE: SURGICAL PAIN
TYPE: SURGICAL PAIN

## 2023-02-23 ASSESSMENT — PAIN DESCRIPTION - DIRECTION: RADIATING_TOWARDS: RT HIP AND LEG

## 2023-02-23 NOTE — PROGRESS NOTES
[address] [address]  Recreational Therapy  Daily Note  [address]    Time Spent with Patient: 25 minutes    Date:  2/23/2023       Patient Name: Gwenith Phoenix      MRN: [id number]      YOB: 1950 (71 y.o.)       Gender: female  Diagnosis: closed intertrochanteric fracture, right  Referring Practitioner: Sidney Mehta MD    RESTRICTIONS/PRECAUTIONS:  Restrictions/Precautions: General Precautions, Fall Risk, Weight Bearing     Hearing: Within functional limits    PAIN: 0    SUBJECTIVE:  I love it     OBJECTIVE:  Pt enjoyed getting her hair washed and styled by our beautician today-affect bright-so appreciative         Patient Education  New Education Provided: Importance of Leisure,     Electronically signed by: LIBBY David  Date: 2/23/2023

## 2023-02-23 NOTE — PLAN OF CARE
Problem: Chronic Conditions and Co-morbidities  Goal: Patient's chronic conditions and co-morbidity symptoms are monitored and maintained or improved  2/22/2023 2256 by Sergio Resendiz RN  Outcome: Progressing  Flowsheets (Taken 2/22/2023 2200)  Care Plan - Patient's Chronic Conditions and Co-Morbidity Symptoms are Monitored and Maintained or Improved:   Monitor and assess patient's chronic conditions and comorbid symptoms for stability, deterioration, or improvement   Collaborate with multidisciplinary team to address chronic and comorbid conditions and prevent exacerbation or deterioration   Update acute care plan with appropriate goals if chronic or comorbid symptoms are exacerbated and prevent overall improvement and discharge  2/22/2023 1450 by Catracho Flores RN  Outcome: Progressing  Flowsheets (Taken 2/22/2023 1450)  Care Plan - Patient's Chronic Conditions and Co-Morbidity Symptoms are Monitored and Maintained or Improved:   Monitor and assess patient's chronic conditions and comorbid symptoms for stability, deterioration, or improvement   Collaborate with multidisciplinary team to address chronic and comorbid conditions and prevent exacerbation or deterioration  Note: Potassium 3.2 today so potassium replacement completed.      Problem: Discharge Planning  Goal: Discharge to home or other facility with appropriate resources  2/22/2023 2256 by Sergio Resendiz RN  Outcome: Progressing  Flowsheets (Taken 2/22/2023 2200)  Discharge to home or other facility with appropriate resources:   Identify barriers to discharge with patient and caregiver   Arrange for needed discharge resources and transportation as appropriate   Identify discharge learning needs (meds, wound care, etc)   Refer to discharge planning if patient needs post-hospital services based on physician order or complex needs related to functional status, cognitive ability or social support system  2/22/2023 1556 by FAITH Claire  Note: SERVANDO met with patient's spouse, Stephanie Stoddard, to follow up discharge planning. Stephanie Stoddard reported wanting a referral to be made to [facility]. SERVANDO left a message with Sol Amado at [facility] to initiate a referral.    SW will follow and maintain involvement in discharge planning.   2/22/2023 1450 by Jose Elias Alvarado RN  Outcome: Progressing  Flowsheets (Taken 2/22/2023 1450)  Discharge to home or other facility with appropriate resources: Identify barriers to discharge with patient and caregiver  Note: Patients plan is for her to be discharged 2/27 to a SNF  2/22/2023 1122 by FAITH Benz  Note: Team conference held Wednesday, 2/22. Recommendations of the team were explained to the patient, Stephanie Jacques by Dr Cesar Hurtado and SERVANDO. Team is recommending that patient continue on acute inpatient rehab for PT, OT and ST, with expected discharge date of Monday, 2/27. Following discharge, team is recommending SNF for continued therapy. Patient and family expressed wanting to continue her therapy at [facility]. Dr Cesra Hurtado and SERVANDO educated them having to follow Medicare days. Family plans on making appeal to Medicare prior to discharge. Patient and family are open into looking at [facility] for continued therapy. Care plan reviewed with patient and family. Patient and family verbalized understanding of the plan of care and contributed to goal setting. Post-acute [facility]) provider list was provided to patient. Patient was informed of their freedom to choose [facility] provider. Discussed and offered to show the patient the relevant [facility] Providers quality and resource use measures on Medicare Compare web site via computer based on patient's goals of care and treatment preferences. Questions regarding selection process were answered. Stephanie Stoddard reported first choice being [facility]. Stephanie Stoddard and Kimberly will review information and let SW know which SNF they would like a referral to. SW to follow and maintain involvement in discharge planning.       Problem: Skin/Tissue Integrity  Goal: Absence of new skin breakdown  Description: 1. Monitor for areas of redness and/or skin breakdown  2. Assess vascular access sites hourly  3. Every 4-6 hours minimum:  Change oxygen saturation probe site  4. Every 4-6 hours:  If on nasal continuous positive airway pressure, respiratory therapy assess nares and determine need for appliance change or resting period. 2/22/2023 2256 by Helen Hawkins RN  Outcome: Progressing  2/22/2023 1450 by Rufino Sánchez RN  Outcome: Progressing  Note: No new skin breakdown noted at this time     Problem: Safety - Adult  Goal: Free from fall injury  2/22/2023 2256 by Helen Hawkins RN  Outcome: Progressing  2/22/2023 1450 by Rufino Sánchez RN  Outcome: Progressing  Flowsheets (Taken 2/22/2023 1450)  Free From Fall Injury: Instruct family/caregiver on patient safety  Note: Patient remains free from falls at this time.      Problem: Pain  Goal: Verbalizes/displays adequate comfort level or baseline comfort level  2/22/2023 2256 by Helen Hawkins RN  Outcome: Progressing  2/22/2023 1450 by Rufino Sánchez RN  Outcome: Progressing  Flowsheets (Taken 2/22/2023 1450)  Verbalizes/displays adequate comfort level or baseline comfort level:   Encourage patient to monitor pain and request assistance   Assess pain using appropriate pain scale   Administer analgesics based on type and severity of pain and evaluate response   Notify Licensed Independent Practitioner if interventions unsuccessful or patient reports new pain   Implement non-pharmacological measures as appropriate and evaluate response  Note: Patients pain is controlled with the current regimen     Problem: Metabolic/Fluid and Electrolytes - Adult  Goal: Electrolytes maintained within normal limits  2/22/2023 2256 by Helen Hawkins RN  Outcome: Progressing  Flowsheets (Taken 2/22/2023 2200)  Electrolytes maintained within normal limits:   Monitor labs and assess patient for signs and symptoms of electrolyte imbalances   Administer electrolyte replacement as ordered  2/22/2023 1450 by Mckayla Bosch RN  Outcome: Not Progressing  Goal: Hemodynamic stability and optimal renal function maintained  2/22/2023 2256 by Radha Padgett RN  Outcome: Progressing  Flowsheets (Taken 2/22/2023 2200)  Hemodynamic stability and optimal renal function maintained:   Monitor labs and assess for signs and symptoms of volume excess or deficit   Monitor intake, output and patient weight   Monitor response to interventions for patient's volume status, including labs, urine output, blood pressure (other measures as available)  2/22/2023 1450 by Mckayla Bosch RN  Outcome: Progressing  Goal: Glucose maintained within prescribed range  2/22/2023 2256 by Radha Padgett RN  Outcome: Progressing  Flowsheets (Taken 2/22/2023 2200)  Glucose maintained within prescribed range:   Monitor blood glucose as ordered   Assess for signs and symptoms of hyperglycemia and hypoglycemia  2/22/2023 1450 by Mckayla Bosch RN  Outcome: Progressing     Problem: Nutrition Deficit:  Goal: Optimize nutritional status  2/22/2023 2256 by Radha Padgett RN  Outcome: Progressing  2/22/2023 1450 by Mckayla Bosch RN  Outcome: Not Progressing  Flowsheets (Taken 2/22/2023 1450)  Nutrient intake appropriate for improving, restoring, or maintaining nutritional needs: Assess nutritional status and recommend course of action  Note: Megace started today     Problem: Metabolic/Fluid and Electrolytes - Adult  Goal: Electrolytes maintained within normal limits  2/22/2023 2256 by Radha Padgett RN  Outcome: Progressing  Flowsheets (Taken 2/22/2023 2200)  Electrolytes maintained within normal limits:   Monitor labs and assess patient for signs and symptoms of electrolyte imbalances   Administer electrolyte replacement as ordered  2/22/2023 1450 by Mckayla Bosch RN  Outcome: Not Progressing

## 2023-02-23 NOTE — PROGRESS NOTES
[address]  Occupational Therapy  Daily Note  Time:    Time In: 1003  Time Out: 1048  Timed Code Treatment Minutes: 45 Minutes  Minutes: 45          Date: 2023  Patient Name: Silvia Jose,   Gender: female      Room: [facility]/055-A  MRN: [id number]  : 1950  (81 y.o.)  Referring Practitioner: Delfino Hollis MD  Diagnosis: closed intertrochanteric fracture, right  Additional Pertinent Hx: The patient was recently hospitalized from 2023 to 2023 initially for abdominal bloating, nausea and leg swelling. She was found to have elevated D-dimer. Subsequent testing revealed right lower lobe pulmonary embolism and right lower extremity DVT. She was at initially treated with IV heparin and later transition to [address] [place] [address]. The patient was brought to ER on 2022 because of progressively increased leg swelling, and nausea/vomiting associated with decreased oral intake. She was found to have BNP of 256.7 and Bumex was prescribed. She then had a fall accident  when she was going to toilet while she was in ER on 2022. The fall resulted severe right hip pain. X-ray of right hip and pelvis revealed acute right femoral intertrochanteric fracture with subtrochanteric extension. Orthopedic service was consulted. Xarelto was held in preparation for surgical management. Cardiology was consulted on 2023 for elevated BNP and Lasix was started. Because of difficulty voiding with urinary retention, urology was also consulted on 2023. Urology service placed Wen with some difficulty on 2023. Therefore Wen was kept in place. On 2/3/2023 the patient underwent open treatment of right intertrochanteric fracture with cephalomedullary nail by Dr. Alf Ji. She is allowed weightbearing as tolerated at the right lower extremity postoperatively.     Restrictions/Precautions:  Restrictions/Precautions: General Precautions, Fall Risk, Weight Bearing  Right Lower Extremity Weight Bearing: Weight Bearing As Tolerated  Position Activity Restriction  Other position/activity restrictions: Tracheostomy/collar 6L. With venturi mask for out of room use 8 liters portable O2. SUBJECTIVE: Upon arrival pt resting in bedside chair and reports urgency to utilize the bathroom. PAIN: Pt. Did not state pain during OT session. Vitals: Vitals not assessed per clinical judgement, see nursing flowsheet    COGNITION: Decreased Insight, Decreased Problem Solving, and Decreased Safety Awareness    ADL:   Grooming: with set-up. To don deoderant  Upper Extremity Dressing: with set-up. To don nightgown  Toileting: Dependent as two assist required Max A x1  To assist with flaquito care x2 trials and clothing management while second assist of CGA x1 for physical assistance required with cues for upright positioning. Toilet Transfer: Min A x2 during multiple trials as rest breaks required in between flaquito care and clothing management as well as two urgencies to have BM. BALANCE:  Sitting Balance:  Supervision. Standing Balance: Contact Guard Assistance, X 1. To CGA x2 varied    BED MOBILITY:  Not Tested    TRANSFERS:  Sit to Stand:  Minimal Assistance, X 1. +CGA x1   Stand to Sit: Minimal Assistance, X 1. +CGA x1  With use of RW, cues required to control ascend/descend. Completed more than one trial during session. FUNCTIONAL MOBILITY:  Assistive Device: Rolling Walker  Assist Level:  Minimal Assistance and X 2. Distance: in pivoting directions (from bedside chair to commode, commode to w/c (x2 trials), and w/c to commode (x2 trials)).     ASSESSMENT:     Activity Tolerance:  Patient tolerance of  treatment: Fair treatment tolerance, Limited by fatigue, Reduced activity pace, and Need for increased rest breaks       Discharge Recommendations: Subacute/skilled nursing facility and Patient would benefit from continued OT at discharge  Equipment Recommendations: Other: Monitor pending progress  Plan: Times Per Week: 5x per week for 90 minutes  Times Per Day: Once a day  Current Treatment Recommendations: Balance training, Functional mobility training, Endurance training, Self-Care / ADL, Safety education & training, Patient/Caregiver education & training, Strengthening    Patient Education  Patient Education: ADL's, Importance of Increasing Activity, and Assistive Device Safety    Goals  Short Term Goals  Time Frame for Short Term Goals: until discharge  Short Term Goal 1: Pt will tolerate 12-15 min EOB ADL task with S to improve trunk control and activity tolerance required for EOB ADLs.  Short Term Goal 2: Pt will complete sit-stand t/fs with Min A x 1 with minimal cues of technique to progress towards BSC t/fs  Short Term Goal 3: Pt will tolerate standing 3 min with CGA for increased ease of clothing management nad LB bathing routine  Short Term Goal 4: Pt will safely navigate short distances with no > than Min A x1 and min VC for safety to increase indep with ADLs  Long Term Goals  Time Frame for Long Term Goals : 2 weeks from IPR OT eval  Long Term Goal 1: Pt will complete sit-stand t/fs with SBA x 1 with minimal cues of technique to progress towards BSC t/fs  Long Term Goal 2: Pt will perform UB/LB dressing and bathing with Min A and minimal cues for ECT to improve functional independence.    Following session, patient left in safe position with all fall risk precautions in place.

## 2023-02-23 NOTE — PROGRESS NOTES
[facility] THERAPY  [facility]  DAILY NOTE    TIME   SLP Individual Minutes  Time In: 0900  Time Out: 30  Minutes: 30  Timed Code Treatment Minutes: 30 Minutes       Date: 2023  Patient Name: Arnold Price      CSN: [id number]   : 1950  (81 y.o.)  Gender: female   Referring Physician:  Raquel Burnett MD  Diagnosis: Closed intertrochanteric fracture, right,  initial encounter  Precautions: Fall risk  Current Diet: Regular and Thin Liquids   Swallowing Strategies: Standard Universal Swallow Precautions  Date of Last MBS/FEES: Not Applicable    Pain:   - Pain location: R hip - RN aware      Subjective:  Patient sitting in recliner upon ST arrival. Agreeable to skilled ST services. Daughter present upon ST arrival, however, left at beginning of session. Pleasant, cooperative, and engaged throughout. Short-Term Goals:  SHORT TERM GOAL #1:  Goal 1: Patient will complete functional problem solving and reasoning tasks with 80% accuracy and min cuing in order to improve completion of ADLs/IADLs at discharge.    INTERVENTIONS:   Money Management - [email or web address]  4/7 independent, 3/7 given min cues    *patient with decreased attention to detail and math computations via pen/paper on this date; required cuing to include all prices in math computations and to check computations for accuracy    SHORT TERM GOAL #2:  Goal 2: Patient will complete functional immediate, working, and delayed recall tasks of 4+ units of information, with or without use of trained recall strategies, with 85% accuracy given min cues to improve retention of pertinent medical information  INTERVENTIONS:   To-Do List  (Plant flowers, make dinner, facebook, grocery store, sweep, dishes)  Immediate recall: 5/6 independent, 1/6 given categorical cue  Delayed (20 min) recall: 3/6 independent, 3/6 with utilization of written list    *patient utilized write it down and repeat it; list written on notepad in patient's room    SHORT TERM GOAL #3:  Goal 3: Patient will complete functional thought organization and sequencing exercises with 90% accuracy and min cuing  in order to improve  completion  of  ADLs/IADLs. INTERVENTIONS: See goal #4 for further information on thought organization and sequencing    SHORT TERM GOAL #4:  Goal 4: Patient will complete sustained, selective, alternating, and divided attention tasks with no more than three  errors/redirections in five minutes/one task in order to allow for safe return to driving at discharge. INTERVENTIONS:   Divided Attention  Patient instructed to sort cards by suit while listening for and counting ST tapping leg. *patient completed task in 1:55 with x1 self correction; ST with x8 leg taps; patient ID x9 leg taps    Patient instructed to sort suits in ascending order    *patient sorted x4 suits in 4:20 with no errors  *good thought organization and sequencing with task this date    Long-Term Goals:  Time Frame for Long Term Goals: 3 weeks    LONG TERM GOAL #1:  Goal 1: Patient will improve cognitive  functioning  to a level of modified independent in order to allow for safe return to [facility]. Comprehension: 5 - Patient understands basic needs (hungry/hot/pain)  Expression: 5 - Expresses basic ideas/needs only (hungry/hot/pain)  Social Interaction: 5 - Patient is appropriate with supervision/cues  Problem Solvin - Patient solves simple/routine tasks 75-90%+   Memory: 4 - Patient remembers 75-90%+ of the time    EDUCATION:  Learner: Patient  Education:  Reviewed ST goals and Plan of Care and Education Related to [employer] and Wellness  Evaluation of Education: [employer] understanding, Demonstrates with assistance, and Needs further instruction    ASSESSMENT/PLAN:  Activity Tolerance:  Patient tolerance of  treatment: good. Assessment/Plan: Patient progressing toward established goals.   Continues to require skilled care of licensed speech pathologist to progress toward achievement of established goals and plan of care. Plan for Next Session: delayed recall, sequencing, attention  Discharge Recommendations: [facility]     Darleen CARSON  Clinician

## 2023-02-23 NOTE — PROGRESS NOTES
[address]  Occupational Therapy  Daily Note  Time:   Time In: 1130  Time Out: 1200  Timed Code Treatment Minutes: 30 Minutes  Minutes: 30          Date: 2023  Patient Name: Whitney Mcgee,   Gender: female      Room: [facility]55/055-A  MRN: [id number]  : 1950  (81 y.o.)  Referring Practitioner: Christal Vasquez MD  Diagnosis: closed intertrochanteric fracture, right  Additional Pertinent Hx: The patient was recently hospitalized from 2023 to 2023 initially for abdominal bloating, nausea and leg swelling. She was found to have elevated D-dimer. Subsequent testing revealed right lower lobe pulmonary embolism and right lower extremity DVT. She was at initially treated with IV heparin and later transition to [address] [place] [address]. The patient was brought to ER on 2022 because of progressively increased leg swelling, and nausea/vomiting associated with decreased oral intake. She was found to have BNP of 256.7 and Bumex was prescribed. She then had a fall accident  when she was going to toilet while she was in ER on 2022. The fall resulted severe right hip pain. X-ray of right hip and pelvis revealed acute right femoral intertrochanteric fracture with subtrochanteric extension. Orthopedic service was consulted. Xarelto was held in preparation for surgical management. Cardiology was consulted on 2023 for elevated BNP and Lasix was started. Because of difficulty voiding with urinary retention, urology was also consulted on 2023. Urology service placed Wen with some difficulty on 2023. Therefore Wen was kept in place. On 2/3/2023 the patient underwent open treatment of right intertrochanteric fracture with cephalomedullary nail by Dr. Macarena Miranda. She is allowed weightbearing as tolerated at the right lower extremity postoperatively.     Restrictions/Precautions:  Restrictions/Precautions: General Precautions, Fall Risk, Weight Bearing  Right Lower Extremity Weight Bearing: Weight Bearing As Tolerated  Position Activity Restriction  Other position/activity restrictions: Tracheostomy/collar 6L. With venturi mask for out of room use 8 liters portable O2. SUBJECTIVE: Pt was up in recliner upon arrival, agreeable to OT with encouragement. PAIN: 5/10    Vitals: Vitals not assessed per clinical judgement, see nursing flowsheet    COGNITION: Decreased Insight, Decreased Problem Solving, and Decreased Safety Awareness    ADL:   Grooming: with set-up. For oral care while seated upright in recliner. Mily Bassett BALANCE:  Sitting Balance:  Modified Independent. Supported in recliner  Standing Balance: [employer]. With VC for upright posture. Pt completed dynamic standing within [facility] as pt refused attempting to stand at [address] [place] [address]. BED MOBILITY:  Not Tested    TRANSFERS:  Sit to Stand:  Minimal Assistance. From various surfaces, VC for technique  Stand to Sit: Minimal Assistance. To various surfaces    ADDITIONAL ACTIVITIES:  Patient completed dynamic standing IADL task of folding linens that facilitated B hand release. Patient required CGA - Min A, and demo'ed an endurance of 1.5min minutes x4 trials. VC for upright posture and tucking bottom in when standing. Demo fair tolerance with mod rest breaks. Standing task completed to challenge endurance and balance required for ADL and IADL skills. ASSESSMENT:     Activity Tolerance:  Patient tolerance of  treatment: Good treatment tolerance       Discharge Recommendations: Continue to assess pending progress and Patient would benefit from continued OT at discharge  Equipment Recommendations: Other: Monitor pending progress  Plan: Times Per Week: 5x per week for 90 minutes  Times Per Day:  Once a day  Current Treatment Recommendations: Balance training, Functional mobility training, Endurance training, Self-Care / ADL, Safety education & training, Patient/Caregiver education & training, Strengthening    Patient Education  Patient Education: Plan of Care, ADL's, Reviewed Prior Education, and Importance of Increasing Activity    Goals  Short Term Goals  Time Frame for Short Term Goals: until discharge  Short Term Goal 1: Pt will tolerate 12-15 min EOB ADL task with S to improve trunk control and activity tolerance required for EOB ADLs. Short Term Goal 2: Pt will complete sit-stand t/fs with Min A x 1 with minimal cues of technique to progress towards BSC t/fs  Short Term Goal 3: Pt will tolerate standing 3 min with CGA for increased ease of clothing management nad LB bathing routine  Short Term Goal 4: Pt will safely navigate short distances with no > than Min A x1 and min VC for safety to increase indep with ADLs  Long Term Goals  Time Frame for Long Term Goals : 2 weeks from IPR OT eval  Long Term Goal 1: Pt will complete sit-stand t/fs with SBA x 1 with minimal cues of technique to progress towards BSC t/fs  Long Term Goal 2: Pt will perform UB/LB dressing and bathing with Min A and minimal cues for ECT to improve functional independence. Following session, patient left in safe position with all fall risk precautions in place.

## 2023-02-23 NOTE — PLAN OF CARE
Problem: Discharge Planning  Goal: Discharge to home or other facility with appropriate resources  Flowsheets (Taken 2/23/2023 2349 by Michell Hays RN)  Discharge to home or other facility with appropriate resources:   Identify barriers to discharge with patient and caregiver   Arrange for needed discharge resources and transportation as appropriate   Identify discharge learning needs (meds, wound care, etc)  Note: SERVANDO spoke with Susy at [facility]. Referral for admission on 2/27 is pending at this time. Jay Pollen to follow up with SERVANDO on 2/24. SW will follow and maintain involvement in discharge planning.

## 2023-02-23 NOTE — PROGRESS NOTES
[facility] PHYSICAL THERAPY  DAILY NOTE  [facility]- [address] - 7E-[id number]/[id number]    Time In: 1330  Time Out: 1400  Timed Code Treatment Minutes: 30 Minutes  Minutes: 30          Date: 2023  Patient Name: Fly Bustillo,  Gender:  female        MRN: [id number]  : 1950  (81 y.o.)     Referring Practitioner: Maximus David MD  Diagnosis: closed intertrochanteric fx, Rt  Additional Pertinent Hx: Carmen Soares is a 73yoF with PMH of recent PE and b/l DVT, macrocytic anemia, CAD, DM2, tracheostomy dependent, obesity, and HTN who presents for leg swelling, nausea, and constipation. She was recently discharged after an 8 day admission for DVTs and PEs. She was placed on Xarelto when discharged and stated that she has been compliant and only missed one dose. She returned due to chronic constipation, feeling ''bloated'', nausea, and LE edema. While being evaluated in the ED the pt had a mechanical fall and developed a R femur fracture. She was found to have supratheraputic INR at 3.83. CT head and C-spine both clear. Pt is s/p R femur ORIF by Dr Giuliana Castro . Pt found to have acute fractures of L2-S1 likely 2/2 osteoporosis, ok for activity as tolerated per Dr Jaspal Lopez . Prior Level of Function:  Lives With: Spouse  Type of Home: House  Home Layout: One level  Home Access: Ramped entrance (or 1 step with no rails.)  Home Equipment: Raquel Snell, rolling, Lift chair (Pt sleeps in lift chair and was using it to assist up to stand PTA)   Bathroom Shower/Tub: Walk-in shower  Bathroom Toilet: Standard  Bathroom Equipment: Built-in shower seat, 3-in-1 commode    ADL Assistance: Needs assistance  Homemaking Assistance: Needs assistance  Ambulation Assistance: Independent  Transfer Assistance: Independent  Active : No  Additional Comments: pt amb short distances in the home with RW,  available  and able to assist as needed.  Has a passi valve that she uses at rest.    Restrictions/Precautions:  Restrictions/Precautions: General Precautions, Fall Risk, Weight Bearing  Right Lower Extremity Weight Bearing: Weight Bearing As Tolerated  Position Activity Restriction  Other position/activity restrictions: Tracheostomy/collar 6L. With venturi mask for out of room use 8 liters portable O2. SUBJECTIVE: Pt seated in recliner. Pleasant and cooperative. Pt demonstrating increased initiative today and asked to not use the [address] stedy for transfers    PAIN: 5/10: RT hip/thigh. Nsg notified for pain meds and pt received at start of session. Vitals: Oxygen: 8 liters used when pt on venturi mask. OBJECTIVE:  Bed Mobility:  Not Tested    Transfers:  Sit to Stand: Minimal Assistance  Stand to Sit:Minimal Assistance  Stand/pivot with RW:  Mod to min assist with mod cues for pt to continue stepping around until fully aligned to next seated surface. Forward flexed upper trunk, small steps. Wheelchair Mobility:  Supervision  Extremities Used: Bilateral Upper Extremities  Type of Chair:Manual  Surface: Level Tile  Distance: 130 ft, 30 ft. Quality: Slow Velocity and Short Strokes   With verbal direction, pt was able to locate wheel locks and disengage      Exercise:    Exercises were completed for increased independence with functional mobility. Seated- long arc quads x10 reps BLEs    Functional Outcome Measures: Not completed       ASSESSMENT:  Assessment: Patient progressing toward established goals. Activity Tolerance:  Patient tolerance of  treatment: good.       Equipment Recommendations:Equipment Needed: No  Other: Pt has RW, manual w/c and mechanical lift device  Discharge Recommendations: Patient would benefit from continued PT at discharge  Plan: Current Treatment Recommendations: Strengthening, Balance training, Functional mobility training, Transfer training, Endurance training, Equipment evaluation, education, & procurement, Patient/Caregiver education & training, Safety education & training, Therapeutic activities, Home exercise program, Wheelchair mobility training, Gait training, Pain management  General Plan:  (5x/ wk 90 min)    Patient Education  Patient Education: Transfers, Wheelchair Mobility, - Patient Requires Continued Education    Goals:  Patient Goals : get around o my own without the RW  Short Term Goals  Time Frame for Short Term Goals: 1 wk  Short Term Goal 1: Pt will go from supine <->sit, mod +1 to get in/out of bed. Short Term Goal 2: Pt will get up/down from bed, into JEANNETTE stedy device, +1 mod assist to progress to up to RW GOAL MET, SEE LTG  Short Term Goal 3: Pt will  the JEANNETTE stedy device, +1 min assist x5 min, to progress to standing with RW  Short Term Goal 4: Pt will propel w/c >= 50 ft, tile surface, CGA for home mobility. GOAL MET, SEE LTG  Long Term Goals  Time Frame for Long Term Goals : 3 wks from evaluation. Long Term Goal 1: Pt to go supine <->sit, min +1 to get in/out of bed. Long Term Goal 2: Pt to get up/down from bed or w/c +1 min assist to get up to walk. Long Term Goal 3: Pt to perform stand/pivot transfer with RW, +1 min assist to get in/out of w/c. Long Term Goal 4: Pt to walk with RW >= 10 ft, mn +1 to progress to home mobility  Long Term Goal 5: Pt to propel w/c >= 50 ft, Mod I, for home mobility  Additional Goals?: Yes  Long term goal 6: Pt to get in/out of car, min +1 for transportation needs. Following session, patient left in safe position with all fall risk precautions in place.

## 2023-02-23 NOTE — PROGRESS NOTES
[address] PHYSICAL THERAPY  DAILY NOTE  [facility] - [id number]/055-A    Time In: 0700  Time Out: 0800  Timed Code Treatment Minutes: 60 Minutes  Minutes: 60          Date: 2023  Patient Name: Deven Alba,  Gender:  female        MRN: [id number]  : 1950  (81 y.o.)     Referring Practitioner: Sae Sanches MD  Diagnosis: closed intertrochanteric fx, Rt  Additional Pertinent Hx: Kiana Kennedy is a 73yoF with PMH of recent PE and b/l DVT, macrocytic anemia, CAD, DM2, tracheostomy dependent, obesity, and HTN who presents for leg swelling, nausea, and constipation. She was recently discharged after an 8 day admission for DVTs and PEs. She was placed on Xarelto when discharged and stated that she has been compliant and only missed one dose. She returned due to chronic constipation, feeling ''bloated'', nausea, and LE edema. While being evaluated in the ED the pt had a mechanical fall and developed a R femur fracture. She was found to have supratheraputic INR at 3.83. CT head and C-spine both clear. Pt is s/p R femur ORIF by Dr Wilver Mcdermott . Pt found to have acute fractures of L2-S1 likely 2/2 osteoporosis, ok for activity as tolerated per Dr Melvin Aponte . Prior Level of Function:  Lives With: Spouse  Type of Home: House  Home Layout: One level  Home Access: Ramped entrance (or 1 step with no rails.)  Home Equipment: Roetta Pro, rolling, Lift chair (Pt sleeps in lift chair and was using it to assist up to stand PTA)   Bathroom Shower/Tub: Walk-in shower  Bathroom Toilet: Standard  Bathroom Equipment: Built-in shower seat, 3-in-1 commode    ADL Assistance: Needs assistance  Homemaking Assistance: Needs assistance  Ambulation Assistance: Independent  Transfer Assistance: Independent  Active : No  Additional Comments: pt amb short distances in the home with RW,  available  and able to assist as needed.  Has a passi valve that she uses at rest.    Restrictions/Precautions:  Restrictions/Precautions: General Precautions, Fall Risk, Weight Bearing  Right Lower Extremity Weight Bearing: Weight Bearing As Tolerated  Position Activity Restriction  Other position/activity restrictions: Tracheostomy/collar 6L. With venturi mask for out of room use 8 liters portable O2. SUBJECTIVE: Pt resting in bed. Pleasant and cooperative. Stated she didn't sleep real well due to being either hot or cold    PAIN: Not rated. Current with pain meds./10: Ice pack applied to Rt hip/thigh at end of session. Vitals: Vitals not assessed per clinical judgement, see nursing flowsheet    OBJECTIVE:  Bed Mobility:  Rolling to Right: Moderate Assistance with rail assist.  Assist needed to further advance hips/ pelvis  Supine to Sit: Moderate Assistance with rail assist.        Transfers:  Sit to Stand: Minimal Assistance, from edge of bed up to RW.  2 trials. Stand to Sit:Moderate Assistance, X 1    Ambulation:  Moderate Assistance, X 2  Distance: 4 ft  Surface: Level Tile  Device:Rolling Walker  Gait Deviations: Forward Flexed Posture, Slow Haylee, Decreased Step Length Bilaterally, Decreased Gait Speed, and Pt wt bears on LT forearm on walker. Mod assist needed of 1 to control RW as pt tends to push it too far ahead. Assist of another for balance with mod cues to encouragement and to fully align to next seated surface. Balance:  Static Sitting Balance:  Supervision, edge of bed  Static Standing Balance: Minimal Assistance, with RW support while therapist donning pt's pull ups. Pt stood approx 1 min for this     Exercise:    Exercises were completed for increased independence with functional mobility. Abdominal isometrics, margarita hip ADD isometrics, BUE diagonals to ea direction, margarita scapular retraction [id number] ea for improved stability with functional mobility.     EXERCISES:  The following exercises were completed to increase range of motion and strength for increased independence with functional mobility:    EXERCISE REPS/SETS   Glut Sets 10/2   Quads Sets 10/2 with manual cues for increased m. Activation RLE   Ankle Pumps 10/2   Heel Slides 10/2 BLEs with min assist RLE   Short Arc Quads 10/2 BLEs with touch cues RLE   Long Arc Quads    Hip ABDuction 10/1 BLEs with manual resistance LLE, CGA RLE   Straight Leg Raise 5/2 RLE with min assist, 10/1 LLE, no assist   Hamstring Curls    Hamstring Curls with Theraband           Functional Outcome Measures: Not completed       ASSESSMENT:  Assessment: Patient progressing toward established goals. Activity Tolerance:  Patient tolerance of  treatment: good. Equipment Recommendations:Equipment Needed: No  Other: Pt has RW, manual w/c and mechanical lift device  Discharge Recommendations: Continue to assess pending progress and Patient would benefit from continued PT at discharge  Plan: Current Treatment Recommendations: Strengthening, Balance training, Functional mobility training, Transfer training, Endurance training, Equipment evaluation, education, & procurement, Patient/Caregiver education & training, Safety education & training, Therapeutic activities, Home exercise program, Wheelchair mobility training, Gait training, Pain management  General Plan:  (5x/ wk 90 min)    Patient Education  Patient Education: Home Exercise Program, Bed Mobility, Transfers, Gait,  - Patient Verbalized Understanding, - Patient Requires Continued Education    Goals:  Patient Goals : get around o my own without the RW  Short Term Goals  Time Frame for Short Term Goals: 1 wk  Short Term Goal 1: Pt will go from supine <->sit, mod +1 to get in/out of bed.   Short Term Goal 2: Pt will get up/down from bed, into JEANNETTE stedy device, +1 mod assist to progress to up to RW GOAL MET, SEE LTG  Short Term Goal 3: Pt will  the JEANNETTE stedy device, +1 min assist x5 min, to progress to standing with RW  Short Term Goal 4: Pt will propel w/c >= 50 ft, tile surface, CGA for home mobility. GOAL MET, SEE LTG  Long Term Goals  Time Frame for Long Term Goals : 3 wks from evaluation. Long Term Goal 1: Pt to go supine <->sit, min +1 to get in/out of bed. Long Term Goal 2: Pt to get up/down from bed or w/c +1 min assist to get up to walk. Long Term Goal 3: Pt to perform stand/pivot transfer with RW, +1 min assist to get in/out of w/c. Long Term Goal 4: Pt to walk with RW >= 10 ft, mn +1 to progress to home mobility  Long Term Goal 5: Pt to propel w/c >= 50 ft, Mod I, for home mobility  Additional Goals?: Yes  Long term goal 6: Pt to get in/out of car, min +1 for transportation needs. Following session, patient left in safe position with all fall risk precautions in place.

## 2023-02-24 LAB
GLUCOSE BLD STRIP.AUTO-MCNC: 97 MG/DL (ref 70–108)
POTASSIUM SERPL-SCNC: 3.9 MEQ/L (ref 3.5–5.2)

## 2023-02-24 PROCEDURE — 97129 THER IVNTJ 1ST 15 MIN: CPT

## 2023-02-24 PROCEDURE — 84132 ASSAY OF SERUM POTASSIUM: CPT

## 2023-02-24 PROCEDURE — 1180000000 HC REHAB R&B

## 2023-02-24 PROCEDURE — 99232 SBSQ HOSP IP/OBS MODERATE 35: CPT | Performed by: PHYSICAL MEDICINE & REHABILITATION

## 2023-02-24 PROCEDURE — 6370000000 HC RX 637 (ALT 250 FOR IP): Performed by: FAMILY MEDICINE

## 2023-02-24 PROCEDURE — 97530 THERAPEUTIC ACTIVITIES: CPT

## 2023-02-24 PROCEDURE — 2580000003 HC RX 258: Performed by: PHYSICAL MEDICINE & REHABILITATION

## 2023-02-24 PROCEDURE — 6370000000 HC RX 637 (ALT 250 FOR IP): Performed by: PHYSICAL MEDICINE & REHABILITATION

## 2023-02-24 PROCEDURE — 2700000000 HC OXYGEN THERAPY PER DAY

## 2023-02-24 PROCEDURE — 97535 SELF CARE MNGMENT TRAINING: CPT

## 2023-02-24 PROCEDURE — 97110 THERAPEUTIC EXERCISES: CPT

## 2023-02-24 PROCEDURE — 82948 REAGENT STRIP/BLOOD GLUCOSE: CPT

## 2023-02-24 PROCEDURE — 94760 N-INVAS EAR/PLS OXIMETRY 1: CPT

## 2023-02-24 PROCEDURE — 97130 THER IVNTJ EA ADDL 15 MIN: CPT

## 2023-02-24 PROCEDURE — 6370000000 HC RX 637 (ALT 250 FOR IP): Performed by: NURSE PRACTITIONER

## 2023-02-24 RX ADMIN — METOLAZONE 2.5 MG: 2.5 TABLET ORAL at 09:56

## 2023-02-24 RX ADMIN — DICLOFENAC SODIUM 2 G: 10 GEL TOPICAL at 22:53

## 2023-02-24 RX ADMIN — MEGESTROL ACETATE 40 MG: 40 TABLET ORAL at 09:56

## 2023-02-24 RX ADMIN — FOLIC ACID 1 MG: 1 TABLET ORAL at 09:55

## 2023-02-24 RX ADMIN — Medication 1 CAPSULE: at 09:55

## 2023-02-24 RX ADMIN — ACETAMINOPHEN 650 MG: 325 TABLET ORAL at 22:54

## 2023-02-24 RX ADMIN — FERROUS SULFATE TAB 325 MG (65 MG ELEMENTAL FE) 325 MG: 325 (65 FE) TAB at 09:55

## 2023-02-24 RX ADMIN — TRAMADOL HYDROCHLORIDE 100 MG: 50 TABLET, COATED ORAL at 06:14

## 2023-02-24 RX ADMIN — ACETAMINOPHEN 650 MG: 325 TABLET ORAL at 06:16

## 2023-02-24 RX ADMIN — PANTOPRAZOLE SODIUM 40 MG: 40 TABLET, DELAYED RELEASE ORAL at 06:16

## 2023-02-24 RX ADMIN — ACETAMINOPHEN 650 MG: 325 TABLET ORAL at 09:56

## 2023-02-24 RX ADMIN — MICONAZOLE NITRATE: 20 POWDER TOPICAL at 10:01

## 2023-02-24 RX ADMIN — DICLOFENAC SODIUM 2 G: 10 GEL TOPICAL at 18:38

## 2023-02-24 RX ADMIN — MICONAZOLE NITRATE: 20 POWDER TOPICAL at 23:07

## 2023-02-24 RX ADMIN — Medication 1 TABLET: at 09:56

## 2023-02-24 RX ADMIN — ROSUVASTATIN 10 MG: 10 TABLET, FILM COATED ORAL at 22:55

## 2023-02-24 RX ADMIN — FUROSEMIDE 40 MG: 40 TABLET ORAL at 09:55

## 2023-02-24 RX ADMIN — SODIUM CHLORIDE, PRESERVATIVE FREE 10 ML: 5 INJECTION INTRAVENOUS at 10:00

## 2023-02-24 RX ADMIN — SODIUM CHLORIDE, PRESERVATIVE FREE 10 ML: 5 INJECTION INTRAVENOUS at 23:07

## 2023-02-24 RX ADMIN — DICLOFENAC SODIUM 2 G: 10 GEL TOPICAL at 09:59

## 2023-02-24 RX ADMIN — Medication 6 MG: at 22:56

## 2023-02-24 RX ADMIN — TRAZODONE HYDROCHLORIDE 100 MG: 100 TABLET ORAL at 22:55

## 2023-02-24 RX ADMIN — ASPIRIN 81 MG 81 MG: 81 TABLET ORAL at 09:55

## 2023-02-24 RX ADMIN — ACETAMINOPHEN 650 MG: 325 TABLET ORAL at 15:30

## 2023-02-24 RX ADMIN — QUETIAPINE FUMARATE 25 MG: 25 TABLET ORAL at 22:55

## 2023-02-24 RX ADMIN — INSULIN GLARGINE 20 UNITS: 100 INJECTION, SOLUTION SUBCUTANEOUS at 22:59

## 2023-02-24 RX ADMIN — RIVAROXABAN 20 MG: 20 TABLET, FILM COATED ORAL at 18:38

## 2023-02-24 RX ADMIN — FLUCONAZOLE 200 MG: 200 TABLET ORAL at 09:55

## 2023-02-24 ASSESSMENT — PAIN DESCRIPTION - ORIENTATION
ORIENTATION: RIGHT

## 2023-02-24 ASSESSMENT — PAIN - FUNCTIONAL ASSESSMENT
PAIN_FUNCTIONAL_ASSESSMENT: ACTIVITIES ARE NOT PREVENTED

## 2023-02-24 ASSESSMENT — PAIN DESCRIPTION - DESCRIPTORS
DESCRIPTORS: ACHING
DESCRIPTORS: ACHING;DISCOMFORT;SPASM;SORE

## 2023-02-24 ASSESSMENT — PAIN DESCRIPTION - LOCATION
LOCATION: HIP

## 2023-02-24 ASSESSMENT — PAIN SCALES - GENERAL
PAINLEVEL_OUTOF10: 6
PAINLEVEL_OUTOF10: 0
PAINLEVEL_OUTOF10: 9
PAINLEVEL_OUTOF10: 8
PAINLEVEL_OUTOF10: 7

## 2023-02-24 NOTE — PROGRESS NOTES
[facility]  Recreational Therapy  Discharge Note  Inpatient Rehabilitation Unit         Date:  2/24/2023       Patient Name: Clint Johnson      MRN: [id number]       YOB: 1950 (71 y.o.)       Gender: female  Diagnosis: closed intertrochanteric fracture, right  Referring Practitioner: Stacy Blackman MD    Patient discharged from Recreational Therapy at this time. See recreational therapy notes for details.     Electronically signed by: LIBBY Rebollar  Date: 2/24/2023

## 2023-02-24 NOTE — PLAN OF CARE
Problem: Discharge Planning  Goal: Discharge to home or other facility with appropriate resources  2/24/2023 1546 by Evelyn Tee, [facility] (Taken 2/24/2023 1157 by Yamilex Pitts LPN)  Discharge to home or other facility with appropriate resources:   Identify barriers to discharge with patient and caregiver   Arrange for needed discharge resources and transportation as appropriate  Note: SERVANDO spoke with Sudheer Antoine at [facility]. She reported not having bed availability until 3/1 for patient. SERVANDO spoke with Riya Armendariz at [place]. He reported not having beds available, but having availability at their [place] location. SW met with patient and her spouse, Yaw Arana. They are not interested in the [place] location. Yaw Arana consented to referrals to [facility] in [place] and [address] spoke with Bertha Rubinstein at [facility]. Referral made. She reported having bed availability for an admission on 2/27. Referral information faxed. SERVANDO left a message for [facility] with [facility] regarding a referral.    SERVANDO will follow and maintain involvement in discharge planning.

## 2023-02-24 NOTE — PROGRESS NOTES
[address]  Occupational Therapy  Daily Note  Time:   Time In: 1200  Time Out: 1230  Timed Code Treatment Minutes: 30 Minutes  Minutes: 30          Date: 2023  Patient Name: Daisy Robles,   Gender: female      Room: 7E-55/055-A  MRN: [id number]  : 1950  (81 y.o.)  Referring Practitioner: Hoang Abarca MD  Diagnosis: closed intertrochanteric fracture, right  Additional Pertinent Hx: The patient was recently hospitalized from 2023 to 2023 initially for abdominal bloating, nausea and leg swelling. She was found to have elevated D-dimer. Subsequent testing revealed right lower lobe pulmonary embolism and right lower extremity DVT. She was at initially treated with IV heparin and later transition to [address] [place] [address]. The patient was brought to ER on 2022 because of progressively increased leg swelling, and nausea/vomiting associated with decreased oral intake. She was found to have BNP of 256.7 and Bumex was prescribed. She then had a fall accident  when she was going to toilet while she was in ER on 2022. The fall resulted severe right hip pain. X-ray of right hip and pelvis revealed acute right femoral intertrochanteric fracture with subtrochanteric extension. Orthopedic service was consulted. Xarelto was held in preparation for surgical management. Cardiology was consulted on 2023 for elevated BNP and Lasix was started. Because of difficulty voiding with urinary retention, urology was also consulted on 2023. Urology service placed Wen with some difficulty on 2023. Therefore Wen was kept in place. On 2/3/2023 the patient underwent open treatment of right intertrochanteric fracture with cephalomedullary nail by Dr. Skyler Jha. She is allowed weightbearing as tolerated at the right lower extremity postoperatively.     Restrictions/Precautions:  Restrictions/Precautions: General Precautions, Fall Risk, Weight Bearing  Right Lower Extremity Weight Bearing: Weight Bearing As Tolerated  Position Activity Restriction  Other position/activity restrictions: Tracheostomy/collar 6L. With venturi mask for out of room use 8 liters portable O2. SUBJECTIVE: Up in chair upon arrival, agreeable to OT session, cooperative    PAIN: 0/10:     Vitals: Vitals not assessed per clinical judgement, see nursing flowsheet    COGNITION: Decreased Problem Solving    ADL:   No ADL's completed this session. .     BED MOBILITY:  Not Tested      ADDITIONAL ACTIVITIES:  Patient completed BUE strengthening exercises with skilled education on HEP: completed x15 reps x1 set with a orange band in all joints and all planes in order to improve UE strength and activity tolerance required for BADL routine and toilet / shower transfers. Patient tolerated well, requiring minimal rest breaks. Patient also required minimal cues for technique. Increased time required        ASSESSMENT:     Activity Tolerance:  Patient tolerance of  treatment: Good treatment toleranceself limiting at times      Discharge Recommendations: Continue to assess pending progress  Equipment Recommendations: Other: Monitor pending progress  Plan: Times Per Week: 5x per week for 90 minutes  Times Per Day: Once a day  Current Treatment Recommendations: Balance training, Functional mobility training, Endurance training, Self-Care / ADL, Safety education & training, Patient/Caregiver education & training, Strengthening    Patient Education  Patient Education: Home Exercise Program    Goals  Short Term Goals  Time Frame for Short Term Goals: until discharge  Short Term Goal 1: Pt will tolerate 12-15 min EOB ADL task with S to improve trunk control and activity tolerance required for EOB ADLs.   Short Term Goal 2: Pt will complete sit-stand t/fs with Min A x 1 with minimal cues of technique to progress towards BSC t/fs  Short Term Goal 3: Pt will tolerate standing 3 min with CGA for increased ease of clothing management nad LB bathing routine  Short Term Goal 4: Pt will safely navigate short distances with no > than Min A x1 and min VC for safety to increase indep with ADLs  Long Term Goals  Time Frame for Long Term Goals : 2 weeks from [facility] OT eval  Long Term Goal 1: Pt will complete sit-stand t/fs with SBA x 1 with minimal cues of technique to progress towards BSC t/fs  Long Term Goal 2: Pt will perform UB/LB dressing and bathing with Min A and minimal cues for ECT to improve functional independence.    Following session, patient left in safe position with all fall risk precautions in place.

## 2023-02-24 NOTE — PROGRESS NOTES
[address]  Occupational Therapy  Daily Note  Time:   Time In: 1330  Time Out: 1400  Timed Code Treatment Minutes: 30 Minutes  Minutes: 30          Date: 2023  Patient Name: Emilee Turner,   Gender: female      Room: -55/055-A  MRN: [id number]  : 1950  (81 y.o.)  Referring Practitioner: Teodora Donato MD  Diagnosis: closed intertrochanteric fracture, right  Additional Pertinent Hx: The patient was recently hospitalized from 2023 to 2023 initially for abdominal bloating, nausea and leg swelling. She was found to have elevated D-dimer. Subsequent testing revealed right lower lobe pulmonary embolism and right lower extremity DVT. She was at initially treated with IV heparin and later transition to [address] [place] [address]. The patient was brought to ER on 2022 because of progressively increased leg swelling, and nausea/vomiting associated with decreased oral intake. She was found to have BNP of 256.7 and Bumex was prescribed. She then had a fall accident  when she was going to toilet while she was in ER on 2022. The fall resulted severe right hip pain. X-ray of right hip and pelvis revealed acute right femoral intertrochanteric fracture with subtrochanteric extension. Orthopedic service was consulted. Xarelto was held in preparation for surgical management. Cardiology was consulted on 2023 for elevated BNP and Lasix was started. Because of difficulty voiding with urinary retention, urology was also consulted on 2023. Urology service placed Wen with some difficulty on 2023. Therefore Wen was kept in place. On 2/3/2023 the patient underwent open treatment of right intertrochanteric fracture with cephalomedullary nail by Dr. Alon Serrato. She is allowed weightbearing as tolerated at the right lower extremity postoperatively.     Restrictions/Precautions:  Restrictions/Precautions: General Precautions, Fall Risk, Weight Bearing  Right Lower Extremity Weight Bearing: Weight Bearing As Tolerated  Position Activity Restriction  Other position/activity restrictions: Tracheostomy/collar 6L. With venturi mask for out of room use 8 liters portable O2. SUBJECTIVE: Pt was up in recliner upon arrival, agreeable to OT with encouragement. PAIN: 9.5/10: R hip    Vitals: Vitals not assessed per clinical judgement, see nursing flowsheet    COGNITION: Decreased Insight, Decreased Problem Solving, Decreased Safety Awareness, Impulsive, and Tangential    ADL:   Footwear Management: Dependent. [place] socks . BALANCE:  Sitting Balance:  Supervision. Standing Balance: Minimal Assistance. With unilateral release from walker    BED MOBILITY:  Not Tested    TRANSFERS:  Sit to Stand:  Minimal Assistance. From recliner x4 trials  Stand to Sit: Minimal Assistance. To recliner    FUNCTIONAL MOBILITY:  Assistive Device: Rolling Walker  Assist Level: Moderate Assistance. Distance:  2' forward and retro  Increased pain reported, no LOB     ADDITIONAL ACTIVITIES:  Patient completed dynamic standing task that facilitated unilateral release. Patient required Min A for standing balance, and demo'ed an endurance of 30-45s x4 trials. Demo fair tolerance with mod rest breaks. Standing task completed to challenge endurance and balance required for ADL and IADL skills. ASSESSMENT:     Activity Tolerance:  Patient tolerance of  treatment: Fair treatment tolerance- required encouragement throughout      Discharge Recommendations: Subacute/skilled nursing facility  Equipment Recommendations: Other: Monitor pending progress  Plan: Times Per Week: 5x per week for 90 minutes  Times Per Day:  Once a day  Current Treatment Recommendations: Balance training, Functional mobility training, Endurance training, Self-Care / ADL, Safety education & training, Patient/Caregiver education & training, Strengthening    Patient Education  Patient Education: Plan of Care, ADL's, Energy Conservation, and Hemibody Awareness/Attention    Goals  Short Term Goals  Time Frame for Short Term Goals: until discharge  Short Term Goal 1: Pt will tolerate 12-15 min EOB ADL task with S to improve trunk control and activity tolerance required for EOB ADLs. Short Term Goal 2: Pt will complete sit-stand t/fs with Min A x 1 with minimal cues of technique to progress towards BSC t/fs  Short Term Goal 3: Pt will tolerate standing 3 min with CGA for increased ease of clothing management nad LB bathing routine  Short Term Goal 4: Pt will safely navigate short distances with no > than Min A x1 and min VC for safety to increase indep with ADLs  Long Term Goals  Time Frame for Long Term Goals : 2 weeks from [facility] OT eval  Long Term Goal 1: Pt will complete sit-stand t/fs with SBA x 1 with minimal cues of technique to progress towards BSC t/fs  Long Term Goal 2: Pt will perform UB/LB dressing and bathing with Min A and minimal cues for ECT to improve functional independence. Following session, patient left in safe position with all fall risk precautions in place.

## 2023-02-24 NOTE — PROGRESS NOTES
[address] [facility] THERAPY  [facility]  DAILY NOTE    TIME   SLP Individual Minutes  Time In: 0830  Time Out: 0900  Minutes: 30  Timed Code Treatment Minutes: 30 Minutes       Date: 2023  Patient Name: Keny Villalpando      CSN: [id number]   : 1950  (81 y.o.)  Gender: female   Referring Physician:  Heather Donaldson MD  Diagnosis: Closed intertrochanteric fracture, right,  initial encounter  Precautions: Fall risk  Current Diet: Regular and Thin Liquids   Swallowing Strategies: Standard Universal Swallow Precautions  Date of Last MBS/FEES: Not Applicable    Pain:   - Pain location: R hip - RN aware      Subjective:  Patient sitting in bed finishing breakfast upon ST arrival. Agreeable to skilled ST services. Pleasant, cooperative, and engaged throughout. Short-Term Goals:  SHORT TERM GOAL #1:  Goal 1: Patient will complete functional problem solving and reasoning tasks with 80% accuracy and min cuing in order to improve completion of ADLs/IADLs at discharge. INTERVENTIONS: Did not address d/t focus on other goals    SHORT TERM GOAL #2:  Goal 2: Patient will complete functional immediate, working, and delayed recall tasks of 4+ units of information, with or without use of trained recall strategies, with 85% accuracy given min cues to improve retention of pertinent medical information  INTERVENTIONS:   To-Do List  (Plant flowers, make dinner, facebook, grocery store, sweep, dishes)  Delayed (~1 day) recall: 4/6 independent, 2/6 given categorical cue    SHORT TERM GOAL #3:  Goal 3: Patient will complete functional thought organization and sequencing exercises with 90% accuracy and min cuing  in order to improve  completion  of  ADLs/IADLs.   INTERVENTIONS:   Sequencing 5 step ADL/IADLs  4/9 independent, 4/9 given min cues, 1/9 given mod verbal cues    *patient with decreased attention to detail; required cuing to check other steps; self correction after given cue with good reasoning for sequencing  *patient with increased time to complete task this date    SHORT TERM GOAL #4:  Goal 4: Patient will complete sustained, selective, alternating, and divided attention tasks with no more than three  errors/redirections in five minutes/one task in order to allow for safe return to driving at discharge. INTERVENTIONS: Did not address d/t focus on other goals    Long-Term Goals:  Time Frame for Long Term Goals: 3 weeks    LONG TERM GOAL #1:  Goal 1: Patient will improve cognitive  functioning  to a level of modified independent in order to allow for safe return to OF. Comprehension: 5 - Patient understands basic needs (hungry/hot/pain)  Expression: 5 - Expresses basic ideas/needs only (hungry/hot/pain)  Social Interaction: 5 - Patient is appropriate with supervision/cues  Problem Solvin - Patient solves simple/routine tasks 75-90%+   Memory: 4 - Patient remembers 75-90%+ of the time    EDUCATION:  Learner: Patient  Education:  Reviewed ST goals and Plan of Care and Education Related to [employer] and Wellness  Evaluation of Education: [employer] understanding, Demonstrates with assistance, and Needs further instruction    ASSESSMENT/PLAN:  Activity Tolerance:  Patient tolerance of  treatment: good. Assessment/Plan: Patient progressing toward established goals. Continues to require skilled care of licensed speech pathologist to progress toward achievement of established goals and plan of care. Plan for Next Session: problem solving/reasoning, attention  Discharge Recommendations: [facility]     Jenny CARSON  Clinician

## 2023-02-24 NOTE — PROGRESS NOTES
Physical Medicine & Rehabilitation Progress Note    Chief Complaint:  Right hip fracture, low back compression fracture    Subjective:    Sascha Herbert is a 81 y.o. 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 The patient previously was admitted to inpatient rehab service from 2022 to 2022 due to critical care myopathy and debility/physical decondition as result of COVID-19 pneumonia. She was discharged to home with referral for home care service on 2022. The patient developed progressive difficulty breathing in 2022 and was found to have glottic stenosis and eventually received tracheostomy by Dr. Darren Hernandez on 2022. The patient's  says the patient also developed progressively worsening lower back pain in summer 2022 and saw orthopedics surgeon at [facility]. The patient has been says the patient was diagnosed of having \"lumbar spine stress fracture\". No surgical intervention was performed. She was treated with brace which she later abandoned due to discomfort associate with brace usage.      The patient was recently hospitalized from 2023 to 2023 initially for abdominal bloating, nausea and leg swelling. She was found to have elevated D-dimer. Subsequent testing revealed right lower lobe pulmonary embolism and right lower extremity DVT. She was at initially treated with IV heparin and later transition to [address] [place] [address]. The patient was brought to ER on 2/1/2022 because of progressively increased leg swelling, and nausea/vomiting associated with decreased oral intake. She was found to have BNP of 256.7 and Bumex was prescribed. She then had a fall accident  when she was going to toilet while she was in ER on 2/1/2022. The fall resulted severe right hip pain. X-ray of right hip and pelvis revealed acute right femoral intertrochanteric fracture with subtrochanteric extension. Orthopedic service was consulted. Xarelto was held in preparation for surgical management. Cardiology was consulted on 2/2/2023 for elevated BNP and Lasix was started. Because of difficulty voiding with urinary retention, urology was also consulted on 2/2/2023. Urology service placed Wen with some difficulty on 2/2/2023. Therefore Wen was kept in place. On 2/3/2023 the patient underwent open treatment of right intertrochanteric fracture with cephalomedullary nail by Dr. Traci Runner. She is allowed weightbearing as tolerated at the right lower extremity postoperatively. PM&R was consulted on 2/5/2023. During the evaluation the patient's  said that the patient began having progressively worsened low back pain starting in summer 2022 began interfering the patient's daily function. Therefore she is sore orthopedic physician at [facility]. Imaging tests were done and the patient was told that she had \" stress fracture\" in her spine. She was provided with a lumbar spine brace to wear but she was not compliant with spine brace application because of discomfort it produced while she was wearing it.   Because no outside lumbar spine images study report was available for review, x-ray of lumbar spine was ordered and performed on 2/6/2023 and showed moderate vertebral body spondylosis in the lumbar and lower thoracic spine, moderate degenerative facet arthropathy involving at least lower 3 lumbar levels, lumbar spine osteoporosis, mild superior endplate depression fracture at L2, L3 and L4. Lumbar spine MRI was recommended by radiologist.  Therefore primary team ordered lumbar spine MRI which was performed this afternoon revealing acute fracture with a fluid cleft associated with endplate at F4-W9 levels with mild height loss at each vertebral bodies. The patient was found to have severe anemia with Hgb/Hct dropped from 7.3/24 on 2/4/2023 down to 5.1/16.9 on 2/6/2023. Therefore she was given blood transfusion for few units. CT of abdomen and pelvis was ordered to rule out possible intra-abdominal source of blood loss. CT abdomen and pelvis done on 2/8/2023 show only pericolonic inflammation with diverticula related to acute diverticulitis, acute on chronic compression fracture of L2, L3, L4 and L5, mild, and marked osteopenia. The patient's hospital course also complicated by intermittent confusion, and development of gluteus region skin breakdown. The patient says she feels well. She says she had good sleep last night. She says her appetite has improved. She continues have a sore pain at her right-sided lower back, right hip and right groin area with intensity rated at 4-6/10 level. She took tramadol 100 mg twice yesterday for pain control. She is says Voltaren gel application also provides significant pain relief temporarily. Her right extremity remains weak. She says her right lower extremity feels heavy to move. She is still on Wen catheter. She continues tolerating the intensive rehab therapy treatment well. The patient is projected to be discharged on 2/27/2023      Rehabilitation:  PT: Reviewed.     Bed Mobility:  Rolling to Right: Moderate Assistance with rail assist. Assist needed to further advance hips/ pelvis  Supine to Sit: Moderate Assistance with rail assist.      Transfers:  Sit to Stand: Minimal Assistance  Stand to Sit:Minimal Assistance  Stand/pivot with RW:  Mod to min assist with mod cues for pt to continue stepping around until fully aligned to next seated surface. Forward flexed upper trunk, small steps. Ambulation:  Moderate Assistance, X 2  Distance: 4 ft  Surface: Level Tile  Device:Rolling Walker  Gait Deviations: Forward Flexed Posture, Slow Haylee, Decreased Step Length Bilaterally, Decreased Gait Speed, and Pt wt bears on LT forearm on walker. Mod assist needed of 1 to control RW as pt tends to push it too far ahead. Assist of another for balance with mod cues to encouragement and to fully align to next seated surface. Wheelchair Mobility:  Supervision  Extremities Used: Bilateral Upper Extremities  Type of Chair:Manual  Surface: Level Tile  Distance: 130 ft, 30 ft. Quality: Slow Velocity and Short Strokes   With verbal direction, pt was able to locate wheel locks and disengage    Balance:  Static Sitting Balance:  Supervision, edge of bed  Static Standing Balance: Minimal Assistance, with RW support while therapist donning pt's pull ups. Pt stood approx 1 min for this       OT: Reviewed. ADL:   Grooming: with set-up. For oral care while seated upright in recliner. Upper Extremity Dressing: with set-up. To don [facility]  Toileting: Dependent as two assist required Max A x1  To assist with flaquito care x2 trials and clothing management while second assist of CGA x1 for physical assistance required with cues for upright positioning. Toilet Transfer: Min A x2 during multiple trials as rest breaks required in between flaquito care and clothing management as well as two urgencies to have BM. BALANCE:  Sitting Balance:  Modified Independent. Supported in recliner  Standing Balance: [address] With VC for upright posture.  Pt completed dynamic standing within jose haider as pt refused attempting to stand at [facility]. TRANSFERS:  Sit to Stand:  Minimal Assistance. From various surfaces, VC for technique  Stand to Sit: Minimal Assistance. To various surfaces    FUNCTIONAL MOBILITY:  Assistive Device: Rolling Walker  Assist Level:  Minimal Assistance and X 2. Distance: in pivoting directions (from bedside chair to commode, commode to w/c (x2 trials), and w/c to commode (x2 trials)). ADDITIONAL ACTIVITIES:  Patient completed dynamic standing IADL task of folding linens that facilitated B hand release. Patient required CGA - Min A, and demo'ed an endurance of 1.5min minutes x4 trials. VC for upright posture and tucking bottom in when standing. Demo fair tolerance with mod rest breaks. Standing task completed to challenge endurance and balance required for ADL and IADL skills. ST: Reviewed. InferX  4/7 independent, 3/7 given min cues     *patient with decreased attention to detail and math computations via pen/paper on this date; required cuing to include all prices in math computations and to check computations for accuracy    To-Do List  (Plant flowers, make dinner, facebook, grocery store, sweep, dishes)  Delayed (~1 day) recall: 4/6 independent, 2/6 given categorical cue    Sequencing 5 step ADL/IADLs  4/9 independent, 4/9 given min cues, 1/9 given mod verbal cues     *patient with decreased attention to detail; required cuing to check other steps; self correction after given cue with good reasoning for sequencing  *patient with increased time to complete task this date    Divided Attention  Patient instructed to sort cards by suit while listening for and counting ST tapping leg.      *patient completed task in 1:55 with x1 self correction; ST with x8 leg taps; patient ID x9 leg taps     Patient instructed to sort suits in ascending order     *patient sorted x4 suits in 4:20 with no errors  *good thought organization and sequencing with task this date      Review of Systems:  Review of Systems   Constitutional:  Positive for appetite change. Negative for chills, diaphoresis, fatigue and fever. HENT:  Negative for hearing loss, rhinorrhea, sneezing, sore throat and trouble swallowing. Eyes:  Negative for visual disturbance. Respiratory:  Negative for cough, shortness of breath and wheezing. Cardiovascular:  Positive for leg swelling. Negative for chest pain and palpitations. Gastrointestinal:  Negative for abdominal distention, abdominal pain, constipation, diarrhea, nausea and vomiting. Genitourinary:  Negative for dysuria. Musculoskeletal:  Positive for arthralgias (Bilateral hip, right groin, and right thigh), back pain (Right-sided lower back), gait problem and myalgias (Right thigh). Negative for neck pain. Skin:  Negative for rash. Neurological:  Positive for weakness (Bilateral lower extremities especially on the right side) and numbness (Bilateral feet). Negative for dizziness, tremors, seizures, speech difficulty, light-headedness and headaches. Psychiatric/Behavioral:  Negative for dysphoric mood, hallucinations and sleep disturbance. The patient is not nervous/anxious.          Objective:  /60   Pulse 95   Temp 98.4 °F (36.9 °C) (Oral)   Resp 18   Ht 5' 2\" (1.575 m)   Wt 219 lb (99.3 kg)   LMP  (LMP Unknown)   SpO2 100%   BMI 40.06 kg/m²   Physical Exam   General:  well-developed, well nourished morbid obese  female ; in no acute distress ; appropriate affect & mood; sitting on reclining chair comfortably; receiving humidified oxygen supplement via trach collar; speaking in whispered manner due to presence of tracheostomy  Eyes: pupil equally round ; extra-ocular motion intact bilaterally  Head, Ear, Nose, Mouth & Throat : normocephalic ; no discharge from ears or nose ; no deformity ; no facial swelling ; oral mucosa pink  Neck :  supple ; presence of tracheostomy at anterior neck; no tenderness; mild muscle spasm at upper trapezius muscle  Cardiovascular : regular rate & rhythm ; normal S1 & S2 heart sound ; no murmur ; normal peripheral pulse at bilateral upper & lower extremities  Pulmonary : Breath sounds present at bilateral lung fields; no wheezing ; no rale; no crackle  Gastrointestinal : soft, protruded abdomen; no tenderness; normal bowel sound present    : Wen catheter in place draining light yellowish urine  Back : no tenderness ; no muscle spasm  Skin: no skin lesion or rash ; no pitting edema at bilateral upper extremities; 1+ to 2+ pitting edema at bilateral legs; presence of healed surgical scar at right upper lateral thigh and hip  Musculoskeletal : no limb asymmetry; no limb deformity; no tenderness at right hip or right thigh with palpation; no tenderness at bilateral upper extremities & the rest of bilateral lower extremities; no palpable mass at limbs ; right hip and right knee joint laxity not assessed; no joints laxity or crepitation at other limb joints; shoulder flexion and abduction passive ROM reaching 160 degrees bilaterally; right hip flexion passive ROM reaching 70 degrees and right knee flexion passive ROM reaching 80 degrees limited by pain at right hip and thigh; left hip flexion passive ROM reaching 90 degrees; ankle dorsiflexion passive ROM reaching 0 degrees bilaterally; otherwise normal functional joints ROM at the rest of bilateral upper & lower extremities  Cerebral :  alert ; awake ; oriented to place, person and time; follow 1-2 steps verbal command  Cerebellum : no dysmetria with bilateral finger-to-nose test ; unable to perform heel-to-shin test on the right side; dysmetria with left heel-to-shin test  Cranial nerve : CN II to XII function grossly intact  Sensory : intact light touch and pin prick sensation at bilateral upper extremities; reduced light touch and pinprick sensation at distal bilateral lower extremities from upper leg region downward in sock distribution  Motor : normal tone at bilateral upper & left lower extremities ; muscle tone not assessed on right lower extremity due to the pain; 2-/5 to 2+/5 muscle strength at right hip flexion; 2-/5 to 2+/5 muscle strength at right hip abduction and adduction; 4+/5 to 5/5 muscle strength at the left hip flexion; 4-/5 muscle strength at right knee extension ; 3+/5 muscle strength at the right knee flexion; otherwise 5/5 muscle strength at the rest of bilateral upper and the lower extremities  Reflex : 1+ bilateral brachioradialis reflexes; 0 bilateral biceps and bilateral knees reflexes   Pathological Reflex :  No Roberta's sign ; no ankle clonus  Gait : Not assessed      Diagnostics:   Recent Results (from the past 24 hour(s))   Potassium    Collection Time: 02/24/23  6:15 AM   Result Value Ref Range    Potassium 3.9 3.5 - 5.2 meq/L   POCT glucose    Collection Time: 02/24/23  6:46 AM   Result Value Ref Range    POC Glucose 97 70 - 108 mg/dl       Impression:  Right femoral intertrochanteric fracture with subtrochanteric extension due to fall requiring open reduction and internal fixation with cephalomedullary nail  Glottic stenosis requiring tracheostomy  Persistent chronic low back pain due to lumbar and lower thoracic spine spondylosis with degenerative facet arthropathy at the lower 3 lumbar levels, and acute L2-S1 endplate fractures  Right posterior tibial, left greater saphenous, left peroneal and left anterior tibial veins deep vein thrombosis, and right lower lobe pulmonary embolism  Decreased appetite, nausea and diarrhea most likely due to viral gastroenteritis  Acute anemia requiring blood transfusion  Urinary retention due to urethra obstruction requiring Wen cath placement   Candida albicans UTI  Lumbar spine osteoporosis  Gluteal region decubitus ulcer  Diabetes mellitus type 2 with poor blood glucose control and complicated by bilateral lower extremities diabetic polyneuropathy  Morbid obesity  Hypertension  Hyperlipidemia  History of lower back pain with history of \"lumbar stress fracture\"  History of COVID infection with pneumonia  History of mild cognitive impairment  History of coronary artery disease requiring coronary artery stenting  Grade 1 left ventricular diastolic dysfunction with preserved ejection fraction     The patient's condition is stable and improving slowly. She still has pain at her right hip, right upper thigh and right lateral lower back area which is controlled with tramadol and Voltaren gel usage. Her right lower extremity remains weak. She now is able to make few steps during the physical therapy session with 2 person moderate assist.  She is tolerating the current rehab therapy treatment well. I believe she will need longer term of daily rehabilitation treatment intervention to further improve her function. SNF subacute rehab program placement as discharge plan is still recommended. Currently the patient is projected to be ready for discharge on 2/27/2023      Plan:  Continues intensive PT/OT/SLP/RT inpatient rehabilitation program at least 3 hours per day, 5 days per week in order to improve functional status prior to discharge. Family education and training will be completed. Equipment evaluations and recommendations will be completed as appropriate. Rehabilitation nursing continues to be involved for bowel, bladder, skin, and pain management. Nursing will also provide education and training to patient and family. Prophylaxis:  DVT: Patient on Xarelto, KENA stocking, intermittent pneumatic compression device.   GI: Colace, Enemeez enema, GlycoLax, Senokot, Movantik, lactulose as needed, milk of magnesium as needed, lactobacillus daily, Imodium as needed for diarrhea, simethicone as needed, Maalox as needed  Pain: Tylenol, Voltaren gel as needed, tramadol as needed  Continue aspirin for coronary artery disease  Continue Lasix for lower extremities edema  Continue Lantus insulin, Humalog insulin, Humalog insulin coverage for diabetes mellitus  Continue Seroquel for anxiety and insomnia   Continue Xarelto for bilateral DVT and pulmonary embolism  Continue Toprol-XL for hypertension  Continue Crestor for hyperlipidemia  Continue melatonin, trazodone as needed for insomnia  Continue Protonix for gastric protection  Continue simethicone as needed for indigestion  Continue multivitamin and ferrous sulfate for anemia  Continue with Diflucan 200 mg daily for 2 weeks for Candida albicans yeast UTI (started on 2/15/2023; end on 2/28/2023)  Continues Megace to stimulate her appetite  Nutrition: Continue current diet  Bladder: Monitoring signs or symptoms of UTI  Bowel: Monitoring signs or symptoms of constipation   and case management for coordination of care and discharge planning      Missed Therapy Time:  None      CYRUS CHADWICK MD

## 2023-02-24 NOTE — PLAN OF CARE
Problem: Chronic Conditions and Co-morbidities  Goal: Patient's chronic conditions and co-morbidity symptoms are monitored and maintained or improved  Outcome: Progressing  Flowsheets (Taken 2/24/2023 0147)  Care Plan - Patient's Chronic Conditions and Co-Morbidity Symptoms are Monitored and Maintained or Improved:   Monitor and assess patient's chronic conditions and comorbid symptoms for stability, deterioration, or improvement   Collaborate with multidisciplinary team to address chronic and comorbid conditions and prevent exacerbation or deterioration     Problem: Discharge Planning  Goal: Discharge to home or other facility with appropriate resources  2/24/2023 0147 by Mariely Sylvester RN  Outcome: Progressing  Flowsheets (Taken 2/24/2023 0147)  Discharge to home or other facility with appropriate resources:   Identify barriers to discharge with patient and caregiver   Arrange for needed discharge resources and transportation as appropriate   Identify discharge learning needs (meds, wound care, etc)     Problem: ABCDS Injury Assessment  Goal: Absence of physical injury  Flowsheets (Taken 2/24/2023 0147)  Absence of Physical Injury: Implement safety measures based on patient assessment     Problem: Safety - Adult  Goal: Free from fall injury  Flowsheets (Taken 2/24/2023 0147)  Free From Fall Injury: Instruct family/caregiver on patient safety     Problem: Pain  Goal: Verbalizes/displays adequate comfort level or baseline comfort level  Flowsheets (Taken 2/24/2023 0147)  Verbalizes/displays adequate comfort level or baseline comfort level:   Encourage patient to monitor pain and request assistance   Assess pain using appropriate pain scale   Administer analgesics based on type and severity of pain and evaluate response     Problem: Nutrition Deficit:  Goal: Optimize nutritional status  Outcome: Progressing     Problem: Metabolic/Fluid and Electrolytes - Adult  Goal: Electrolytes maintained within normal limits  Flowsheets (Taken 2/24/2023 0147)  Electrolytes maintained within normal limits:   Monitor labs and assess patient for signs and symptoms of electrolyte imbalances   Administer electrolyte replacement as ordered   Monitor response to electrolyte replacements, including repeat lab results as appropriate     Problem: Pain  Goal: Verbalizes/displays adequate comfort level or baseline comfort level  Flowsheets (Taken 2/24/2023 0147)  Verbalizes/displays adequate comfort level or baseline comfort level:   Encourage patient to monitor pain and request assistance   Assess pain using appropriate pain scale   Administer analgesics based on type and severity of pain and evaluate response     Problem: Nutrition Deficit:  Goal: Optimize nutritional status  Outcome: Progressing     Problem: Metabolic/Fluid and Electrolytes - Adult  Goal: Electrolytes maintained within normal limits  Flowsheets (Taken 2/24/2023 0147)  Electrolytes maintained within normal limits:   Monitor labs and assess patient for signs and symptoms of electrolyte imbalances   Administer electrolyte replacement as ordered   Monitor response to electrolyte replacements, including repeat lab results as appropriate     Problem: Metabolic/Fluid and Electrolytes - Adult  Goal: Hemodynamic stability and optimal renal function maintained  Outcome: Not Progressing  Flowsheets (Taken 2/24/2023 0147)  Hemodynamic stability and optimal renal function maintained:   Monitor labs and assess for signs and symptoms of volume excess or deficit   Monitor intake, output and patient weight   Encourage oral intake as appropriate     Problem: Metabolic/Fluid and Electrolytes - Adult  Goal: Glucose maintained within prescribed range  Outcome: Progressing  Flowsheets (Taken 2/24/2023 0147)  Glucose maintained within prescribed range:   Monitor blood glucose as ordered   Assess for signs and symptoms of hyperglycemia and hypoglycemia   Administer ordered medications to maintain glucose within target range     Problem: Metabolic/Fluid and Electrolytes - Adult  Goal: Hemodynamic stability and optimal renal function maintained  Outcome: Not Progressing  Flowsheets (Taken 2/24/2023 0147)  Hemodynamic stability and optimal renal function maintained:   Monitor labs and assess for signs and symptoms of volume excess or deficit   Monitor intake, output and patient weight   Encourage oral intake as appropriate

## 2023-02-24 NOTE — PROGRESS NOTES
[facility]  INPATIENT PHYSICAL THERAPY  Daily Note  [address]    Time In: 1130  Time Out: 1200  Timed Code Treatment Minutes: 30 Minutes  Minutes: 30          Date: 2023  Patient Name: Deven Alba,  Gender:  female        MRN: [id number]  : 1950  (81 y.o.)     Referring Practitioner: Sae Sanches MD  Diagnosis: closed intertrochanteric fx, Rt  Additional Pertinent Hx: Kiana Kennedy is a 73yoF with PMH of recent PE and b/l DVT, macrocytic anemia, CAD, DM2, tracheostomy dependent, obesity, and HTN who presents for leg swelling, nausea, and constipation. She was recently discharged after an 8 day admission for DVTs and PEs. She was placed on Xarelto when discharged and stated that she has been compliant and only missed one dose. She returned due to chronic constipation, feeling ''bloated'', nausea, and LE edema. While being evaluated in the ED the pt had a mechanical fall and developed a R femur fracture. She was found to have supratheraputic INR at 3.83. CT head and C-spine both clear. Pt is s/p R femur ORIF by Dr Wilver Mcdermott . Pt found to have acute fractures of L2-S1 likely 2/2 osteoporosis, ok for activity as tolerated per Dr Melvin Aponte . Prior Level of Function:  Lives With: Spouse  Type of Home: House  Home Layout: One level  Home Access: Ramped entrance (or 1 step with no rails.)  Home Equipment: Roetta Pro, rolling, Lift chair (Pt sleeps in lift chair and was using it to assist up to stand PTA)   Bathroom Shower/Tub: Walk-in shower  Bathroom Toilet: Standard  Bathroom Equipment: Built-in shower seat, 3-in-1 commode    ADL Assistance: Needs assistance  Homemaking Assistance: Needs assistance  Ambulation Assistance: Independent  Transfer Assistance: Independent  Active : No  Additional Comments: pt amb short distances in the home with RW,  available  and able to assist as needed.  Has a passi valve that she uses at rest.    Restrictions/Precautions:  Restrictions/Precautions: General Precautions, Fall Risk, Weight Bearing  Right Lower Extremity Weight Bearing: Weight Bearing As Tolerated  Position Activity Restriction  Other position/activity restrictions: Tracheostomy/collar 6L. With venturi mask for out of room use 8 liters portable O2. SUBJECTIVE: Pt. Seated on her BS chair and pleasantly agrees to therapy session. Pt. Urgently requests to use BS commode during functional mobility. Extra time required for toileting. Pain:  None indicated    Vitals:   Patient Vitals for the past 8 hrs:   BP Patient Position Temp Temp src Pulse Resp SpO2   02/24/23 1016 101/60 -- -- -- 95 -- 100 %   02/24/23 0822 102/66 Supine 98.4 °F (36.9 °C) Oral 85 18 100 %   02/24/23 0644 -- -- -- -- -- 18 --   02/24/23 0614 -- -- -- -- -- 18 --     *Vitals may reflect data entered into the flowsheet prior to or after PT session was completed. OBJECTIVE:  Bed Mobility:  Not Tested    Transfers:  Sit to Stand: Contact Guard Assistance  Stand to [address], Minimal Assistance, with increased time for completion, with verbal cues for posture and to align self up with seat. Pt. Limited by fatigue. Pt. Using RW for transfer but leaning onto forearms. Ambulation:  None    Stairs:  None    Balance:  Static Standing Balance: Contact Guard Assistance  Dynamic Standing Balance: Contact Guard Assistance  While PT completed pericare and assisted with clothing management. Neuromuscular Re-education  None    Exercise:  Patient was guided in 1 set(s) 5 reps of exercises: Glut sets, Seated marches,  Seated heel/toe raises, Long arc quads, Seated isometric hip adduction, Seated abduction/adduction, and abdominal isometrics. Exercises were completed for increased independence with functional mobility.     Functional Outcome Measures:   Not completed    ASSESSMENT:  Assessment: Patient progressing toward established goals. Activity Tolerance:  Patient tolerance of  treatment: good. Equipment Recommendations:Equipment Needed: No  Other: Pt has RW, manual w/c and mechanical lift device  Discharge Recommendations: Continue to assess pending progress, Patient would benefit from continued therapy after discharge    PLAN: Current Treatment Recommendations: Strengthening, Balance training, Functional mobility training, Transfer training, Endurance training, Equipment evaluation, education, & procurement, Patient/Caregiver education & training, Safety education & training, Therapeutic activities, Home exercise program, Wheelchair mobility training, Gait training, Pain management  General Plan:  (5x/ wk 90 min)    Patient Education  Patient Education: Plan of Care, Functional Mobility, Reviewed Prior Education, Health Promotion and Wellness Education, Safety, Verbal Exercise Instruction    Goals:  Patient Goals : get around o my own without the RW  Short Term Goals  Time Frame for Short Term Goals: 1 wk  Short Term Goal 1: Pt will go from supine <->sit, mod +1 to get in/out of bed. Short Term Goal 2: Pt will get up/down from bed, into JEANNETTE stedy device, +1 mod assist to progress to up to RW GOAL MET, SEE LTG  Short Term Goal 3: Pt will  the JEANNETTE stedy device, +1 min assist x5 min, to progress to standing with RW  Short Term Goal 4: Pt will propel w/c >= 50 ft, tile surface, CGA for home mobility. GOAL MET, SEE LTG  Long Term Goals  Time Frame for Long Term Goals : 3 wks from evaluation. Long Term Goal 1: Pt to go supine <->sit, min +1 to get in/out of bed. Long Term Goal 2: Pt to get up/down from bed or w/c +1 min assist to get up to walk. Long Term Goal 3: Pt to perform stand/pivot transfer with RW, +1 min assist to get in/out of w/c.   Long Term Goal 4: Pt to walk with RW >= 10 ft, mn +1 to progress to home mobility  Long Term Goal 5: Pt to propel w/c >= 50 ft, Mod I, for home mobility  Additional Goals?: Yes  Long term goal 6: Pt to get in/out of car, min +1 for transportation needs. Following session, patient left in safe position with all fall risk precautions in place.

## 2023-02-24 NOTE — PROGRESS NOTES
[facility] PHYSICAL THERAPY  DAILY NOTE  [address] [facility],4Th Floor - 7E-55/055-A    Time In: 0900  Time Out: 1000  Timed Code Treatment Minutes: 60 Minutes  Minutes: 60          Date: 2023  Patient Name: Keny Villalpando,  Gender:  female        MRN: [id number]  : 1950  (81 y.o.)     Referring Practitioner: Bao Mcgowan MD  Diagnosis: closed intertrochanteric fx, Rt  Additional Pertinent Hx: Ally Hoew is a 73yoF with PMH of recent PE and b/l DVT, macrocytic anemia, CAD, DM2, tracheostomy dependent, obesity, and HTN who presents for leg swelling, nausea, and constipation. She was recently discharged after an 8 day admission for DVTs and PEs. She was placed on Xarelto when discharged and stated that she has been compliant and only missed one dose. She returned due to chronic constipation, feeling ''bloated'', nausea, and LE edema. While being evaluated in the ED the pt had a mechanical fall and developed a R femur fracture. She was found to have supratheraputic INR at 3.83. CT head and C-spine both clear. Pt is s/p R femur ORIF by Dr Eddie Jack . Pt found to have acute fractures of L2-S1 likely 2/2 osteoporosis, ok for activity as tolerated per Dr Celso West . Prior Level of Function:  Lives With: Spouse  Type of Home: House  Home Layout: One level  Home Access: Ramped entrance (or 1 step with no rails.)  Home Equipment: Shad Menjivar, rolling, Lift chair (Pt sleeps in lift chair and was using it to assist up to stand PTA)   Bathroom Shower/Tub: Walk-in shower  Bathroom Toilet: Standard  Bathroom Equipment: Built-in shower seat, 3-in-1 commode    ADL Assistance: Needs assistance  Homemaking Assistance: Needs assistance  Ambulation Assistance: Independent  Transfer Assistance: Independent  Active : No  Additional Comments: pt amb short distances in the home with RW,  available  and able to assist as needed.  Has a passi valve that she uses at rest.    Restrictions/Precautions:  Restrictions/Precautions: General Precautions, Fall Risk, Weight Bearing  Right Lower Extremity Weight Bearing: Weight Bearing As Tolerated  Position Activity Restriction  Other position/activity restrictions: Tracheostomy/collar 6L. With venturi mask for out of room use 8 liters portable O2. SUBJECTIVE: Pt supine with head of bed raise upon arrival. PT reports soreness from yesterday, but overall noting an improvement in strength. Daughter present and support throughout session. On entering gym and standing for first task, pt experienced incontinence; returned to room for  toileting and flaquito care. Remainer of session completed from recliner. PAIN: not rated    Vitals: oxygen: 99% on 8L upon entering gym    OBJECTIVE:  Bed Mobility:  Supine to Sit: Moderate Assistance with head of bed elevated ~45 degrees; pt requiring assist for trunk flexion and LE positioning  Scooting: pt demonstrates difficult with scooting in sitting position; increased time and effort to scoot to edge of bed    Transfers:  Sit to Stand: Maximum Assistance x1 initially, for proceeding transfers pt required maximum assistance x1 + moderate to maximum assistance of another  *verbal cueing for hand placement and upright standing posture  Stand to Sit:Moderate Assistance x2, verbal cueing for control with eccentric lowering  To/From Bed and Chair: Moderate assist x1 + minimal assist x1 bed to wheelchair / Maximum Assistance x1 +moderate assist x1 commode to chair   *performed with RW    *pt completed multiple transfers during toileting, cueing for upright posture. Pt fatigues quickly, requiring rest breaks throughout    Ambulation:   Moderate assist x1 + minimal assist x1 bed to wheelchair / Maximum Assistance x1 +moderate assist x1 commode to chair   Distance: minimal distances for transfers (~5')  Surface: Level Tile  Device:Rolling Walker  Gait Deviations:   Forward Flexed Posture, Slow Haylee, Decreased Step Length Bilaterally, Decreased Gait Speed, and Decreased Heel Strike Bilaterally  *cueing for upright posture, larger steps    Balance:  Static Sitting Balance:  Stand By Assistance  *sitting edge of bed, pt required B UE support, reports decreased tolerance sitting without back support    Exercise:  heelslides: in supine with HOB elevated pt instructed to actively perform heelsides to facilitate hip and knee flexion. Able to achieve ~30 degrees of knee flexion on R actively. SPT provided slight over pressure at end range of active motion to obtain stretch of quad. Held 10 sec, 5x each LE  Resisted ankle DF/PF: pt cued to DF against resitance and then plantar flex against resistance. Completed 10x each LE  Abduction/adduction: pt instructed to actively abduct R hip. Pt dispalted difficulty, able to actively abduct ~15 degrees. SPT provided sliht elevation to remove friction from bed, pt performed active assisted Abduction and adduction against minimal resistance. Completed 2x5. On L LE, moderate assist provided for abduction and adduction  Sitting in recliner, pt performed 2x5 Long arc quads B, cued to hold in extension and slight assist to reach full knee extension on R. Sitting in recliner with feet firm on foot rest/tray, pt instructed to perform seated marches for quad activation. Moderate assist provided for R LE march in sitting. Performed 2x5 with rest break, alternating marching between [employer] in recliner, pt eperformed 2x5 glute sets, maintaining contraction for 3 sec  In recliner with leg rest support, pt performed 2x5 quad sets B    Exercises were completed for increased independence with functional mobility. Functional Outcome Measures: Not completed       ASSESSMENT:  Assessment: Patient progressing toward established goals. Activity Tolerance:  Patient tolerance of  treatment: good.       Equipment Recommendations:Equipment Needed: No  Other: Pt has RW, manual w/c and mechanical lift device  Discharge Recommendations: Continue to assess pending progress and Patient would benefit from continued PT at discharge  Plan: Current Treatment Recommendations: Strengthening, Balance training, Functional mobility training, Transfer training, Endurance training, Equipment evaluation, education, & procurement, Patient/Caregiver education & training, Safety education & training, Therapeutic activities, Home exercise program, Wheelchair mobility training, Gait training, Pain management  General Plan:  (5x/ wk 90 min)    Patient Education  Patient Education: Bed Mobility, Transfers, Gait, Verbal Exercise Instruction,  - Patient Verbalized Understanding, - Patient Requires Continued Education    Goals:  Patient Goals : get around o my own without the RW  Short Term Goals  Time Frame for Short Term Goals: 1 wk  Short Term Goal 1: Pt will go from supine <->sit, mod +1 to get in/out of bed. Short Term Goal 2: Pt will get up/down from bed, into JEANNETTE stedy device, +1 mod assist to progress to up to RW GOAL MET, SEE LTG  Short Term Goal 3: Pt will  the JEANNETTE stedy device, +1 min assist x5 min, to progress to standing with RW  Short Term Goal 4: Pt will propel w/c >= 50 ft, tile surface, CGA for home mobility. GOAL MET, SEE LTG  Long Term Goals  Time Frame for Long Term Goals : 3 wks from evaluation. Long Term Goal 1: Pt to go supine <->sit, min +1 to get in/out of bed. Long Term Goal 2: Pt to get up/down from bed or w/c +1 min assist to get up to walk. Long Term Goal 3: Pt to perform stand/pivot transfer with RW, +1 min assist to get in/out of w/c. Long Term Goal 4: Pt to walk with RW >= 10 ft, mn +1 to progress to home mobility  Long Term Goal 5: Pt to propel w/c >= 50 ft, Mod I, for home mobility  Additional Goals?: Yes  Long term goal 6: Pt to get in/out of car, min +1 for transportation needs. Following session, patient left in safe position with all fall risk precautions in place.

## 2023-02-24 NOTE — PROGRESS NOTES
Patient: Gweneth Boas  Unit/Bed: 3X-24/707-R  YOB: 1950  MRN: [id number] Acct: [de-identified]   Admitting Diagnosis: Closed intertrochanteric fracture, right, initial encounter [facility]) Shanique Elmira  Admit Date:  2/10/2023  Hospital Day: 13    Assessment:     Principal Problem:    Closed intertrochanteric fracture, right, initial encounter [facility])  Active Problems:    S/P tracheoplasty    Pulmonary embolism on right ([address]    Deep vein thrombosis (DVT) of both lower extremities ([facility]    Hyperlipidemia    Compression of lumbar vertebra ([facility]    Anemia associated with acute blood loss    Osteoporosis of lumbar spine    Debility    Physical deconditioning    Essential hypertension    Type 2 diabetes mellitus with insulin therapy ([facility]    Morbid obesity with BMI of 40.0-44.9, adult ([facility]  Resolved Problems:    * No resolved hospital problems. *      Plan:     I gave her and her  a copy of the last total protein level and  H/H. We discussed how the low values lead to movement of fluid from the vessels to the tissues. I told them that lasix and other diuretics have a difficult time reducing the lower leg edema. Will try adding zaroxolyn to the lasix and follow the BMP. Reduce the lantus further due to the low CS        Subjective:     Patient has no complaint of CP or SOB. .   Medication side effects: none    Scheduled Meds:   insulin glargine  20 Units SubCUTAneous Nightly    metOLazone  2.5 mg Oral Daily    ferrous sulfate  325 mg Oral Daily with breakfast    megestrol  40 mg Oral Daily    lactobacillus  1 capsule Oral Daily with breakfast    furosemide  40 mg Oral Daily    diclofenac sodium  2 g Topical 4x daily    fluconazole  200 mg Oral Daily    pantoprazole  40 mg Oral QAM AC    traZODone  100 mg Oral Nightly    rivaroxaban  20 mg Oral Daily    multivitamin  1 tablet Oral Daily    rosuvastatin  10 mg Oral Daily    senna  2 tablet Oral Nightly    sodium chloride flush  5-40 mL IntraVENous 2 times per day    acetaminophen  650 mg Oral Q6H    docusate sodium  100 mg Oral BID    aspirin  81 mg Oral Daily    docusate sodium  1 enema Rectal Daily    folic acid  1 mg Oral Daily    metoprolol succinate  25 mg Oral Daily    melatonin  6 mg Oral Nightly    QUEtiapine  25 mg Oral Nightly    miconazole   Topical BID     Continuous Infusions:   sodium chloride      dextrose       PRN Meds:aluminum & magnesium hydroxide-simethicone, loperamide, promethazine, simethicone, traMADol **OR** traMADol, diclofenac sodium, albuterol sulfate HFA, Menthol, guaiFENesin, sodium chloride flush, sodium chloride, potassium chloride **OR** potassium alternative oral replacement **OR** potassium chloride, magnesium sulfate, glucose, dextrose bolus **OR** dextrose bolus, glucagon (rDNA), dextrose, acetaminophen, bisacodyl, magnesium hydroxide, polyethylene glycol, lactulose    Review of Systems  Pertinent items are noted in HPI. Objective:     No data found. I/O last 3 completed shifts: In: 7738 [P.O.:1297;  I.V.:10]  Out: 1900 [Urine:1900]  I/O this shift:  In: 240 [P.O.:240]  Out: -     /60   Pulse 90   Temp 98.3 °F (36.8 °C) (Oral)   Resp 20   Ht 5' 2\" (1.575 m)   Wt 218 lb 1.6 oz (98.9 kg)   LMP  (LMP Unknown)   SpO2 92%   BMI 39.89 kg/m²     General appearance: alert, appears stated age, and cooperative  Head: Normocephalic, without obvious abnormality, atraumatic  Lungs: clear to auscultation bilaterally  Heart: regular rate and rhythm, S1, S2 normal, no murmur, click, rub or gallop  Abdomen: soft, non-tender; bowel sounds normal; no masses,  no organomegaly  Extremities: edema 1-2+ to the lower legs  Skin: Skin color, texture, turgor normal. No rashes or lesions  Neurologic:  weak    Data Review:    Latest Reference Range & Units 2/20/23 07:39 2/21/23 08:31 2/22/23 07:40 2/23/23 06:59   POC Glucose 70 - 108 mg/dl 92 99 85 98       Electronically signed by Nidai De La Fuente MD on 2/23/2023 at 7:59 PM

## 2023-02-24 NOTE — PROGRESS NOTES
[address]  Occupational Therapy  Daily Note  Time:    Time In: 1030  Time Out: 1100  Timed Code Treatment Minutes: 30 Minutes  Minutes: 30          Date: 2023  Patient Name: Vijay Keita,   Gender: female      Room: 7E-55/055-A  MRN: [id number]  : 1950  (81 y.o.)  Referring Practitioner: Jian Tello MD  Diagnosis: closed intertrochanteric fracture, right  Additional Pertinent Hx: The patient was recently hospitalized from 2023 to 2023 initially for abdominal bloating, nausea and leg swelling. She was found to have elevated D-dimer. Subsequent testing revealed right lower lobe pulmonary embolism and right lower extremity DVT. She was at initially treated with IV heparin and later transition to [address] [place] [address]. The patient was brought to ER on 2022 because of progressively increased leg swelling, and nausea/vomiting associated with decreased oral intake. She was found to have BNP of 256.7 and Bumex was prescribed. She then had a fall accident  when she was going to toilet while she was in ER on 2022. The fall resulted severe right hip pain. X-ray of right hip and pelvis revealed acute right femoral intertrochanteric fracture with subtrochanteric extension. Orthopedic service was consulted. Xarelto was held in preparation for surgical management. Cardiology was consulted on 2023 for elevated BNP and Lasix was started. Because of difficulty voiding with urinary retention, urology was also consulted on 2023. Urology service placed Wen with some difficulty on 2023. Therefore Wen was kept in place. On 2/3/2023 the patient underwent open treatment of right intertrochanteric fracture with cephalomedullary nail by Dr. Jerad Boggs. She is allowed weightbearing as tolerated at the right lower extremity postoperatively.     Restrictions/Precautions:  Restrictions/Precautions: General Precautions, Fall Risk, Weight Bearing  Right Lower Extremity Weight Bearing: Weight Bearing As Tolerated  Position Activity Restriction  Other position/activity restrictions: Tracheostomy/collar 6L. With venturi mask for out of room use 8 liters portable O2. SUBJECTIVE: Pt was up in recliner, agreeable to OT with encouragement. PAIN: 4/10: RLE    Vitals: Vitals not assessed per clinical judgement, see nursing flowsheet    COGNITION: Decreased Insight, Decreased Problem Solving, and Decreased Safety Awareness    ADL:   Grooming: with set-up. For completion of oral care and washing face. Pt able to apply lotion to UB, A required to apply lotion to LB and B feet  Bathing: Moderate Assistance. A for distal LB. Increased time for bathing routine required. Footwear Management: Dependent. [place]/donning slipper socks . BALANCE:  Sitting Balance:  Modified Independent. Upright in recliner, supported. ASSESSMENT:     Activity Tolerance:  Patient tolerance of  treatment: Fair treatment tolerance - pt requires max encouragment throughout for participation, refusing all out of chair activity      Discharge Recommendations: Subacute/skilled nursing facility  Equipment Recommendations: Other: Monitor pending progress  Plan: Times Per Week: 5x per week for 90 minutes  Times Per Day: Once a day  Current Treatment Recommendations: Balance training, Functional mobility training, Endurance training, Self-Care / ADL, Safety education & training, Patient/Caregiver education & training, Strengthening    Patient Education  Patient Education: Plan of Care, ADL's, and Importance of Increasing Activity    Goals  Short Term Goals  Time Frame for Short Term Goals: until discharge  Short Term Goal 1: Pt will tolerate 12-15 min EOB ADL task with S to improve trunk control and activity tolerance required for EOB ADLs.   Short Term Goal 2: Pt will complete sit-stand t/fs with Min A x 1 with minimal cues of technique to progress towards [facility] t/fs  Short Term Goal 3: Pt will tolerate standing 3 min with CGA for increased ease of clothing management nad LB bathing routine  Short Term Goal 4: Pt will safely navigate short distances with no > than Min A x1 and min VC for safety to increase indep with ADLs  Long Term Goals  Time Frame for Long Term Goals : 2 weeks from IPR OT eval  Long Term Goal 1: Pt will complete sit-stand t/fs with SBA x 1 with minimal cues of technique to progress towards BSC t/fs  Long Term Goal 2: Pt will perform UB/LB dressing and bathing with Min A and minimal cues for ECT to improve functional independence. Following session, patient left in safe position with all fall risk precautions in place.

## 2023-02-25 LAB — GLUCOSE BLD STRIP.AUTO-MCNC: 108 MG/DL (ref 70–108)

## 2023-02-25 PROCEDURE — 6370000000 HC RX 637 (ALT 250 FOR IP): Performed by: FAMILY MEDICINE

## 2023-02-25 PROCEDURE — 6370000000 HC RX 637 (ALT 250 FOR IP): Performed by: PHYSICAL MEDICINE & REHABILITATION

## 2023-02-25 PROCEDURE — 82948 REAGENT STRIP/BLOOD GLUCOSE: CPT

## 2023-02-25 PROCEDURE — 97530 THERAPEUTIC ACTIVITIES: CPT

## 2023-02-25 PROCEDURE — 94760 N-INVAS EAR/PLS OXIMETRY 1: CPT

## 2023-02-25 PROCEDURE — 97110 THERAPEUTIC EXERCISES: CPT

## 2023-02-25 PROCEDURE — 2700000000 HC OXYGEN THERAPY PER DAY

## 2023-02-25 PROCEDURE — 97535 SELF CARE MNGMENT TRAINING: CPT

## 2023-02-25 PROCEDURE — 1180000000 HC REHAB R&B

## 2023-02-25 PROCEDURE — 2580000003 HC RX 258: Performed by: PHYSICAL MEDICINE & REHABILITATION

## 2023-02-25 RX ADMIN — METOLAZONE 2.5 MG: 2.5 TABLET ORAL at 09:05

## 2023-02-25 RX ADMIN — DICLOFENAC SODIUM 2 G: 10 GEL TOPICAL at 17:22

## 2023-02-25 RX ADMIN — LOPERAMIDE HYDROCHLORIDE 2 MG: 2 CAPSULE ORAL at 09:06

## 2023-02-25 RX ADMIN — Medication 1 CAPSULE: at 09:05

## 2023-02-25 RX ADMIN — MEGESTROL ACETATE 40 MG: 40 TABLET ORAL at 09:06

## 2023-02-25 RX ADMIN — METOPROLOL SUCCINATE 25 MG: 25 TABLET, FILM COATED, EXTENDED RELEASE ORAL at 09:05

## 2023-02-25 RX ADMIN — Medication 6 MG: at 22:29

## 2023-02-25 RX ADMIN — ACETAMINOPHEN 650 MG: 325 TABLET ORAL at 09:06

## 2023-02-25 RX ADMIN — TRAZODONE HYDROCHLORIDE 100 MG: 100 TABLET ORAL at 22:29

## 2023-02-25 RX ADMIN — SODIUM CHLORIDE, PRESERVATIVE FREE 10 ML: 5 INJECTION INTRAVENOUS at 22:29

## 2023-02-25 RX ADMIN — RIVAROXABAN 20 MG: 20 TABLET, FILM COATED ORAL at 17:19

## 2023-02-25 RX ADMIN — DICLOFENAC SODIUM 2 G: 10 GEL TOPICAL at 09:06

## 2023-02-25 RX ADMIN — FERROUS SULFATE TAB 325 MG (65 MG ELEMENTAL FE) 325 MG: 325 (65 FE) TAB at 09:06

## 2023-02-25 RX ADMIN — MICONAZOLE NITRATE: 20 POWDER TOPICAL at 22:39

## 2023-02-25 RX ADMIN — ROSUVASTATIN 10 MG: 10 TABLET, FILM COATED ORAL at 22:29

## 2023-02-25 RX ADMIN — PANTOPRAZOLE SODIUM 40 MG: 40 TABLET, DELAYED RELEASE ORAL at 06:34

## 2023-02-25 RX ADMIN — FLUCONAZOLE 200 MG: 200 TABLET ORAL at 09:06

## 2023-02-25 RX ADMIN — Medication 1 TABLET: at 09:05

## 2023-02-25 RX ADMIN — ACETAMINOPHEN 650 MG: 325 TABLET ORAL at 03:12

## 2023-02-25 RX ADMIN — QUETIAPINE FUMARATE 25 MG: 25 TABLET ORAL at 22:29

## 2023-02-25 RX ADMIN — ACETAMINOPHEN 650 MG: 325 TABLET ORAL at 22:29

## 2023-02-25 RX ADMIN — ACETAMINOPHEN 650 MG: 325 TABLET ORAL at 17:19

## 2023-02-25 RX ADMIN — DICLOFENAC SODIUM 2 G: 10 GEL TOPICAL at 22:28

## 2023-02-25 RX ADMIN — MICONAZOLE NITRATE: 20 POWDER TOPICAL at 09:51

## 2023-02-25 RX ADMIN — INSULIN GLARGINE 20 UNITS: 100 INJECTION, SOLUTION SUBCUTANEOUS at 22:37

## 2023-02-25 RX ADMIN — FUROSEMIDE 40 MG: 40 TABLET ORAL at 09:05

## 2023-02-25 RX ADMIN — SODIUM CHLORIDE, PRESERVATIVE FREE 10 ML: 5 INJECTION INTRAVENOUS at 09:51

## 2023-02-25 RX ADMIN — FOLIC ACID 1 MG: 1 TABLET ORAL at 09:06

## 2023-02-25 RX ADMIN — ASPIRIN 81 MG 81 MG: 81 TABLET ORAL at 09:06

## 2023-02-25 ASSESSMENT — PAIN SCALES - GENERAL
PAINLEVEL_OUTOF10: 5
PAINLEVEL_OUTOF10: 0
PAINLEVEL_OUTOF10: 5

## 2023-02-25 ASSESSMENT — PAIN DESCRIPTION - LOCATION
LOCATION: HIP
LOCATION: HIP

## 2023-02-25 ASSESSMENT — PAIN DESCRIPTION - DESCRIPTORS
DESCRIPTORS: ACHING

## 2023-02-25 ASSESSMENT — PAIN DESCRIPTION - ORIENTATION
ORIENTATION: RIGHT
ORIENTATION: RIGHT

## 2023-02-25 NOTE — PROGRESS NOTES
[address] [facility] PHYSICAL THERAPY  DAILY NOTE  [address] [facility],4Th Floor - 7E-55/055-[address]    Time In: 1100  Time Out: 1130  Timed Code Treatment Minutes: 30 Minutes  Minutes: 30          Date: 2023  Patient Name: Adrian Radford,  Gender:  female        MRN: [id number]  : 1950  (81 y.o.)     Referring Practitioner: Janae Urbina MD  Diagnosis: closed intertrochanteric fx, Rt  Additional Pertinent Hx: Vincent Ledezma is a 73yoF with PMH of recent PE and b/l DVT, macrocytic anemia, CAD, DM2, tracheostomy dependent, obesity, and HTN who presents for leg swelling, nausea, and constipation. She was recently discharged after an 8 day admission for DVTs and PEs. She was placed on Xarelto when discharged and stated that she has been compliant and only missed one dose. She returned due to chronic constipation, feeling ''bloated'', nausea, and LE edema. While being evaluated in the ED the pt had a mechanical fall and developed a R femur fracture. She was found to have supratheraputic INR at 3.83. CT head and C-spine both clear. Pt is s/p R femur ORIF by Dr Ingrid Posada . Pt found to have acute fractures of L2-S1 likely 2/2 osteoporosis, ok for activity as tolerated per Dr Maxi Gay . Prior Level of Function:  Lives With: Spouse  Type of Home: House  Home Layout: One level  Home Access: Ramped entrance (or 1 step with no rails.)  Home Equipment: emil Huitron Lift chair (Pt sleeps in lift chair and was using it to assist up to stand PTA)   Bathroom Shower/Tub: Walk-in shower  Bathroom Toilet: Standard  Bathroom Equipment: Built-in shower seat, 3-in-1 commode    ADL Assistance: Needs assistance  Homemaking Assistance: Needs assistance  Ambulation Assistance: Independent  Transfer Assistance: Independent  Active : No  Additional Comments: pt amb short distances in the home with RW,  available  and able to assist as needed.  Has a passi valve that she uses at rest.    Restrictions/Precautions:  Restrictions/Precautions: General Precautions, Fall Risk, Weight Bearing  Right Lower Extremity Weight Bearing: Weight Bearing As Tolerated  Position Activity Restriction  Other position/activity restrictions: Tracheostomy/collar 6L. With venturi mask for out of room use 8 liters portable O2. SUBJECTIVE: Patient seated in recliner upon PTA arrival. Patient pleasant however, patient reports not feeling well this date and reports stomach ache along with diarrhea. Patient with low motivation this date secondary to not feeling well. Patient with request to stay in room and demonstrates limited standing tolerance secondary to not feeling well. PAIN: 8/10: LBP, right hip/knee pain with limited Wbing tolerance    Vitals: Oxygen: 98%  Heart Rate: 89%  Vitals taken at rest and while performing LE therex while in BS chair. OBJECTIVE:  Bed Mobility:  Not Tested    Transfers:  Sit to Stand: Contact Guard Assistance  Stand to [place] [id number]   Patient unable to complete stand pivot transfers this date. Ambulation:  Not Tested    Balance:  Static Sitting Balance:  Supervision  Static standing balance: Contact Guard Assistance. Patient with limited standing tolerance up to 20-30 seconds at a time before requesting a seated rest break. Patient requires encouragement to increase standing tolerance at this time. Patient continues to c/o LBP and right hip/knee pain and rates 8/10. Patient not due for pain medication at this time. Patient standing at [facility] with fwd flexed posture and demonstrates difficulty attempting to complete upright posture. Patient unable to achieve upright standing posture for improved standing posture at this time. Exercise:  Patient was guided in 1 set(s) 10 reps of exercise to both lower extremities.   Ankle pumps, Glut sets, Quad sets, Heelslides, Short arc quads, Hip abduction/adduction, Straight leg raises, Seated marches, Seated heel/toe raises, Long arc quads, Seated isometric hip adduction, and Seated abduction/adduction. Exercises were completed for increased independence with functional mobility. Functional Outcome Measures: Not completed       ASSESSMENT:  Assessment: Patient progressing toward established goals. Activity Tolerance:  Patient tolerance of  treatment: good. Equipment Recommendations:Equipment Needed: No  Other: Pt has RW, manual w/c and mechanical lift device  Discharge Recommendations: Continue to assess pending progress  Plan: Current Treatment Recommendations: Strengthening, Balance training, Functional mobility training, Transfer training, Endurance training, Equipment evaluation, education, & procurement, Patient/Caregiver education & training, Safety education & training, Therapeutic activities, Home exercise program, Wheelchair mobility training, Gait training, Pain management  General Plan:  (5x/ wk 90 min)    Patient Education  Patient Education: Plan of Care, Transfers, Verbal Exercise Instruction,  - Patient Verbalized Understanding    Goals:  Patient Goals : get around o my own without the RW  Short Term Goals  Time Frame for Short Term Goals: 1 wk  Short Term Goal 1: Pt will go from supine <->sit, mod +1 to get in/out of bed. Short Term Goal 2: Pt will get up/down from bed, into JEANNETTE stedy device, +1 mod assist to progress to up to RW GOAL MET, SEE LTG  Short Term Goal 3: Pt will  the JEANNETTE stedy device, +1 min assist x5 min, to progress to standing with RW  Short Term Goal 4: Pt will propel w/c >= 50 ft, tile surface, CGA for home mobility. GOAL MET, SEE LTG  Long Term Goals  Time Frame for Long Term Goals : 3 wks from evaluation. Long Term Goal 1: Pt to go supine <->sit, min +1 to get in/out of bed. Long Term Goal 2: Pt to get up/down from bed or w/c +1 min assist to get up to walk. Long Term Goal 3: Pt to perform stand/pivot transfer with RW, +1 min assist to get in/out of w/c.   Long Term Goal 4: Pt to walk with RW >= 10 ft, mn +1 to progress to home mobility  Long Term Goal 5: Pt to propel w/c >= 50 ft, Mod I, for home mobility  Additional Goals?: Yes  Long term goal 6: Pt to get in/out of car, min +1 for transportation needs. Following session, patient left in safe position with all fall risk precautions in place.

## 2023-02-25 NOTE — PLAN OF CARE
Problem: Respiratory - Adult  Goal: Achieves optimal ventilation and oxygenation  Outcome: Progressing  Flowsheets (Taken 2/24/2023 1940 by Laine Barth RN)  Achieves optimal ventilation and oxygenation:   Assess for changes in respiratory status   Assess for changes in mentation and behavior   Position to facilitate oxygenation and minimize respiratory effort   Oxygen supplementation based on oxygen saturation or arterial blood gases

## 2023-02-25 NOTE — PLAN OF CARE
Problem: Chronic Conditions and Co-morbidities  Goal: Patient's chronic conditions and co-morbidity symptoms are monitored and maintained or improved  Outcome: Progressing  Flowsheets (Taken 2/24/2023 1940)  Care Plan - Patient's Chronic Conditions and Co-Morbidity Symptoms are Monitored and Maintained or Improved: Monitor and assess patient's chronic conditions and comorbid symptoms for stability, deterioration, or improvement     Problem: Skin/Tissue Integrity  Goal: Absence of new skin breakdown  Description: 1. Monitor for areas of redness and/or skin breakdown  2. Assess vascular access sites hourly  3. Every 4-6 hours minimum:  Change oxygen saturation probe site  4. Every 4-6 hours:  If on nasal continuous positive airway pressure, respiratory therapy assess nares and determine need for appliance change or resting period.   Outcome: Progressing     Problem: ABCDS Injury Assessment  Goal: Absence of physical injury  Outcome: Progressing     Problem: Safety - Adult  Goal: Free from fall injury  Outcome: Progressing     Problem: Pain  Goal: Verbalizes/displays adequate comfort level or baseline comfort level  Outcome: Progressing  Flowsheets (Taken 2/24/2023 2045)  Verbalizes/displays adequate comfort level or baseline comfort level:   Encourage patient to monitor pain and request assistance   Assess pain using appropriate pain scale     Problem: Metabolic/Fluid and Electrolytes - Adult  Goal: Electrolytes maintained within normal limits  Outcome: Progressing  Flowsheets (Taken 2/24/2023 1940)  Electrolytes maintained within normal limits: Monitor labs and assess patient for signs and symptoms of electrolyte imbalances     Problem: Metabolic/Fluid and Electrolytes - Adult  Goal: Hemodynamic stability and optimal renal function maintained  Recent Flowsheet Documentation  Taken 2/24/2023 1940 by Michelle Mejia RN  Hemodynamic stability and optimal renal function maintained:   Monitor labs and assess for signs and symptoms of volume excess or deficit   Monitor intake, output and patient weight     Problem: Metabolic/Fluid and Electrolytes - Adult  Goal: Glucose maintained within prescribed range  Outcome: Progressing  Flowsheets (Taken 2/24/2023 1940)  Glucose maintained within prescribed range:   Monitor blood glucose as ordered   Assess for signs and symptoms of hyperglycemia and hypoglycemia

## 2023-02-25 NOTE — PLAN OF CARE
Problem: Chronic Conditions and Co-morbidities  Goal: Patient's chronic conditions and co-morbidity symptoms are monitored and maintained or improved  2/25/2023 1122 by Alex Gabriel RN  Outcome: Progressing  Flowsheets (Taken 2/25/2023 0900)  Care Plan - Patient's Chronic Conditions and Co-Morbidity Symptoms are Monitored and Maintained or Improved:   Monitor and assess patient's chronic conditions and comorbid symptoms for stability, deterioration, or improvement   Collaborate with multidisciplinary team to address chronic and comorbid conditions and prevent exacerbation or deterioration   Update acute care plan with appropriate goals if chronic or comorbid symptoms are exacerbated and prevent overall improvement and discharge  2/25/2023 0251 by Marita Ratliff RN  Outcome: Progressing  [facility] (Taken 2/24/2023 1940)  Care Plan - Patient's Chronic Conditions and Co-Morbidity Symptoms are Monitored and Maintained or Improved: Monitor and assess patient's chronic conditions and comorbid symptoms for stability, deterioration, or improvement     Problem: Discharge Planning  Goal: Discharge to home or other facility with appropriate resources  Outcome: Progressing  Flowsheets (Taken 2/25/2023 0900)  Discharge to home or other facility with appropriate resources:   Identify barriers to discharge with patient and caregiver   Arrange for needed discharge resources and transportation as appropriate   Identify discharge learning needs (meds, wound care, etc)   Refer to discharge planning if patient needs post-hospital services based on physician order or complex needs related to functional status, cognitive ability or social support system     Problem: Pain  Goal: Verbalizes/displays adequate comfort level or baseline comfort level  2/25/2023 1122 by Alex Gabriel RN  Outcome: Progressing  2/25/2023 0251 by Marita Ratliff RN  Outcome: Progressing  Flowsheets (Taken 2/24/2023 2045)  Verbalizes/displays adequate comfort level or baseline comfort level:   Encourage patient to monitor pain and request assistance   Assess pain using appropriate pain scale     Problem: Metabolic/Fluid and Electrolytes - Adult  Goal: Electrolytes maintained within normal limits  2/25/2023 1122 by Pricila Barron RN  Outcome: Progressing  Flowsheets (Taken 2/25/2023 0900)  Electrolytes maintained within normal limits:   Monitor labs and assess patient for signs and symptoms of electrolyte imbalances   Administer electrolyte replacement as ordered  2/25/2023 0251 by Iris Chavez RN  Outcome: Progressing  Flowsheets (Taken 2/24/2023 1940)  Electrolytes maintained within normal limits: Monitor labs and assess patient for signs and symptoms of electrolyte imbalances  Goal: Hemodynamic stability and optimal renal function maintained  Outcome: Progressing  Flowsheets (Taken 2/25/2023 0900)  Hemodynamic stability and optimal renal function maintained:   Monitor labs and assess for signs and symptoms of volume excess or deficit   Monitor intake, output and patient weight   Instruct patient on fluid and nutrition restrictions as appropriate  Goal: Glucose maintained within prescribed range  2/25/2023 1122 by Pricila Barron RN  Outcome: Progressing  Flowsheets (Taken 2/25/2023 0900)  Glucose maintained within prescribed range:   Monitor blood glucose as ordered   Assess for signs and symptoms of hyperglycemia and hypoglycemia   Administer ordered medications to maintain glucose within target range   Assess barriers to adequate nutritional intake and initiate nutrition consult as needed   Instruct patient on self management of diabetes and initiate consult as needed  2/25/2023 0251 by Iris Chavez RN  Outcome: Progressing  Flowsheets (Taken 2/24/2023 1940)  Glucose maintained within prescribed range:   Monitor blood glucose as ordered   Assess for signs and symptoms of hyperglycemia and hypoglycemia

## 2023-02-25 NOTE — PROGRESS NOTES
[facility] [address]  Occupational Therapy  Daily Note  Time:   Time In: 0730  Time Out: 0800  Timed Code Treatment Minutes: 30 Minutes  Minutes: 30          Date: 2023  Patient Name: Gilma Chneey,   Gender: female      Room: [facility]55/055-A  MRN: [id number]  : 1950  (81 y.o.)  Referring Practitioner: Juan Jose Raphael MD  Diagnosis: closed intertrochanteric fracture, right  Additional Pertinent Hx: The patient was recently hospitalized from 2023 to 2023 initially for abdominal bloating, nausea and leg swelling. She was found to have elevated D-dimer. Subsequent testing revealed right lower lobe pulmonary embolism and right lower extremity DVT. She was at initially treated with IV heparin and later transition to [address] [place] [address]. The patient was brought to ER on 2022 because of progressively increased leg swelling, and nausea/vomiting associated with decreased oral intake. She was found to have BNP of 256.7 and Bumex was prescribed. She then had a fall accident  when she was going to toilet while she was in ER on 2022. The fall resulted severe right hip pain. X-ray of right hip and pelvis revealed acute right femoral intertrochanteric fracture with subtrochanteric extension. Orthopedic service was consulted. Xarelto was held in preparation for surgical management. Cardiology was consulted on 2023 for elevated BNP and Lasix was started. Because of difficulty voiding with urinary retention, urology was also consulted on 2023. Urology service placed Wen with some difficulty on 2023. Therefore Wen was kept in place. On 2/3/2023 the patient underwent open treatment of right intertrochanteric fracture with cephalomedullary nail by Dr. Traci Runner. She is allowed weightbearing as tolerated at the right lower extremity postoperatively.     Restrictions/Precautions:  Restrictions/Precautions: General Precautions, Fall Risk, Weight Bearing  Right Lower Extremity Weight Bearing: Weight Bearing As Tolerated  Position Activity Restriction  Other position/activity restrictions: Tracheostomy/collar 6L. With venturi mask for out of room use 8 liters portable O2. SUBJECTIVE: pt sitting on commode when arrived. Pt agreeable to OT     PAIN: 5/10: hip     Vitals: Oxygen: 6L O2 tracheostomy/collar at 95%    COGNITION: Inattention, Decreased Problem Solving, and Decreased Safety Awareness    ADL:   Toileting: Maximum Assistance. To wipe bottom in standing x2 trials   Toilet Transfer: Minimal Assistance, Moderate Assistance, with verbal cues , and with increased time for completion. With use of RW to [facility]  . BALANCE:  Standing Balance: Minimal Assistance, Moderate Assistance, with verbal cues , with increased time for completion. At [facility] likes to lean on RW     BED MOBILITY:  Not Tested    TRANSFERS:  Sit to Stand: Moderate Assistance. From commode and recliner   Stand to Sit: Minimal Assistance, with increased time for completion. To commode and recliner     FUNCTIONAL MOBILITY:  Assistive Device: Rolling Walker  Assist Level: Moderate Assistance. Distance:  from recliner to commode and commode to recliner   Pt had no LOB, is unsteady at times and likes to lean on RW at times    Did to and from commode x2 trials     ASSESSMENT:     Activity Tolerance:  Patient tolerance of  treatment: Fair treatment tolerance      Discharge Recommendations: Continue to assess pending progress  Equipment Recommendations: Other: Monitor pending progress  Plan: Times Per Week: 5x per week for 90 minutes  Times Per Day:  Once a day  Current Treatment Recommendations: Balance training, Functional mobility training, Endurance training, Self-Care / ADL, Safety education & training, Patient/Caregiver education & training, Strengthening    Patient Education  Patient Education: ADL's and Energy Conservation    Goals  Short Term Goals  Time Frame for Short Term Goals: until discharge  Short Term Goal 1: Pt will tolerate 12-15 min EOB ADL task with S to improve trunk control and activity tolerance required for EOB ADLs. Short Term Goal 2: Pt will complete sit-stand t/fs with Min A x 1 with minimal cues of technique to progress towards BSC t/fs  Short Term Goal 3: Pt will tolerate standing 3 min with CGA for increased ease of clothing management nad LB bathing routine  Short Term Goal 4: Pt will safely navigate short distances with no > than Min A x1 and min VC for safety to increase indep with ADLs  Long Term Goals  Time Frame for Long Term Goals : 2 weeks from IPR OT eval  Long Term Goal 1: Pt will complete sit-stand t/fs with SBA x 1 with minimal cues of technique to progress towards BSC t/fs  Long Term Goal 2: Pt will perform UB/LB dressing and bathing with Min A and minimal cues for ECT to improve functional independence. Following session, patient left in safe position with all fall risk precautions in place.

## 2023-02-26 LAB
GLUCOSE BLD STRIP.AUTO-MCNC: 114 MG/DL (ref 70–108)
GLUCOSE BLD STRIP.AUTO-MCNC: 122 MG/DL (ref 70–108)
GLUCOSE BLD STRIP.AUTO-MCNC: 127 MG/DL (ref 70–108)

## 2023-02-26 PROCEDURE — 6370000000 HC RX 637 (ALT 250 FOR IP): Performed by: PHYSICAL MEDICINE & REHABILITATION

## 2023-02-26 PROCEDURE — 97530 THERAPEUTIC ACTIVITIES: CPT

## 2023-02-26 PROCEDURE — 94760 N-INVAS EAR/PLS OXIMETRY 1: CPT

## 2023-02-26 PROCEDURE — 1180000000 HC REHAB R&B

## 2023-02-26 PROCEDURE — 2700000000 HC OXYGEN THERAPY PER DAY

## 2023-02-26 PROCEDURE — 6370000000 HC RX 637 (ALT 250 FOR IP): Performed by: FAMILY MEDICINE

## 2023-02-26 PROCEDURE — 6370000000 HC RX 637 (ALT 250 FOR IP): Performed by: NURSE PRACTITIONER

## 2023-02-26 PROCEDURE — 2580000003 HC RX 258: Performed by: PHYSICAL MEDICINE & REHABILITATION

## 2023-02-26 PROCEDURE — 82948 REAGENT STRIP/BLOOD GLUCOSE: CPT

## 2023-02-26 PROCEDURE — 97535 SELF CARE MNGMENT TRAINING: CPT

## 2023-02-26 RX ORDER — FERROUS SULFATE 325(65) MG
325 TABLET ORAL
Qty: 30 TABLET | Refills: 3 | Status: CANCELLED | OUTPATIENT
Start: 2023-02-27

## 2023-02-26 RX ORDER — FLUCONAZOLE 200 MG/1
200 TABLET ORAL DAILY
Qty: 2 TABLET | Refills: 0 | Status: CANCELLED | OUTPATIENT
Start: 2023-02-27 | End: 2023-03-01

## 2023-02-26 RX ADMIN — DICLOFENAC SODIUM 2 G: 10 GEL TOPICAL at 09:29

## 2023-02-26 RX ADMIN — METOPROLOL SUCCINATE 25 MG: 25 TABLET, FILM COATED, EXTENDED RELEASE ORAL at 09:19

## 2023-02-26 RX ADMIN — ACETAMINOPHEN 650 MG: 325 TABLET ORAL at 16:48

## 2023-02-26 RX ADMIN — FOLIC ACID 1 MG: 1 TABLET ORAL at 09:19

## 2023-02-26 RX ADMIN — FERROUS SULFATE TAB 325 MG (65 MG ELEMENTAL FE) 325 MG: 325 (65 FE) TAB at 09:19

## 2023-02-26 RX ADMIN — TRAZODONE HYDROCHLORIDE 100 MG: 100 TABLET ORAL at 23:49

## 2023-02-26 RX ADMIN — ASPIRIN 81 MG 81 MG: 81 TABLET ORAL at 09:19

## 2023-02-26 RX ADMIN — FUROSEMIDE 40 MG: 40 TABLET ORAL at 09:19

## 2023-02-26 RX ADMIN — Medication 1 TABLET: at 09:18

## 2023-02-26 RX ADMIN — TRAMADOL HYDROCHLORIDE 100 MG: 50 TABLET, COATED ORAL at 23:49

## 2023-02-26 RX ADMIN — DICLOFENAC SODIUM 2 G: 10 GEL TOPICAL at 16:48

## 2023-02-26 RX ADMIN — LOPERAMIDE HYDROCHLORIDE 2 MG: 2 CAPSULE ORAL at 06:30

## 2023-02-26 RX ADMIN — RIVAROXABAN 20 MG: 20 TABLET, FILM COATED ORAL at 16:47

## 2023-02-26 RX ADMIN — PROMETHAZINE HYDROCHLORIDE 25 MG: 25 TABLET ORAL at 03:18

## 2023-02-26 RX ADMIN — ROSUVASTATIN 10 MG: 10 TABLET, FILM COATED ORAL at 23:50

## 2023-02-26 RX ADMIN — Medication 6 MG: at 23:50

## 2023-02-26 RX ADMIN — QUETIAPINE FUMARATE 25 MG: 25 TABLET ORAL at 23:50

## 2023-02-26 RX ADMIN — PROMETHAZINE HYDROCHLORIDE 25 MG: 25 TABLET ORAL at 09:19

## 2023-02-26 RX ADMIN — FLUCONAZOLE 200 MG: 200 TABLET ORAL at 09:19

## 2023-02-26 RX ADMIN — SODIUM CHLORIDE, PRESERVATIVE FREE 10 ML: 5 INJECTION INTRAVENOUS at 17:03

## 2023-02-26 RX ADMIN — MEGESTROL ACETATE 40 MG: 40 TABLET ORAL at 09:19

## 2023-02-26 RX ADMIN — ACETAMINOPHEN 650 MG: 325 TABLET ORAL at 23:49

## 2023-02-26 RX ADMIN — ACETAMINOPHEN 650 MG: 325 TABLET ORAL at 03:17

## 2023-02-26 RX ADMIN — PANTOPRAZOLE SODIUM 40 MG: 40 TABLET, DELAYED RELEASE ORAL at 05:45

## 2023-02-26 RX ADMIN — ACETAMINOPHEN 650 MG: 325 TABLET ORAL at 09:19

## 2023-02-26 RX ADMIN — Medication 1 CAPSULE: at 09:19

## 2023-02-26 RX ADMIN — DICLOFENAC SODIUM 2 G: 10 GEL TOPICAL at 23:53

## 2023-02-26 RX ADMIN — METOLAZONE 2.5 MG: 2.5 TABLET ORAL at 09:18

## 2023-02-26 RX ADMIN — MICONAZOLE NITRATE: 20 POWDER TOPICAL at 09:23

## 2023-02-26 RX ADMIN — PROMETHAZINE HYDROCHLORIDE 25 MG: 25 TABLET ORAL at 16:51

## 2023-02-26 ASSESSMENT — PAIN DESCRIPTION - ONSET: ONSET: ON-GOING

## 2023-02-26 ASSESSMENT — ENCOUNTER SYMPTOMS
DIARRHEA: 0
BACK PAIN: 1
SHORTNESS OF BREATH: 0
WHEEZING: 0
TROUBLE SWALLOWING: 0
NAUSEA: 0
SORE THROAT: 0
CONSTIPATION: 0
RHINORRHEA: 0
ABDOMINAL PAIN: 0
ABDOMINAL DISTENTION: 0
VOMITING: 0
COUGH: 0

## 2023-02-26 ASSESSMENT — PAIN - FUNCTIONAL ASSESSMENT: PAIN_FUNCTIONAL_ASSESSMENT: ACTIVITIES ARE NOT PREVENTED

## 2023-02-26 ASSESSMENT — PAIN DESCRIPTION - LOCATION
LOCATION: HIP

## 2023-02-26 ASSESSMENT — PAIN SCALES - GENERAL
PAINLEVEL_OUTOF10: 7
PAINLEVEL_OUTOF10: 6
PAINLEVEL_OUTOF10: 0
PAINLEVEL_OUTOF10: 5

## 2023-02-26 ASSESSMENT — PAIN DESCRIPTION - DESCRIPTORS
DESCRIPTORS: ACHING

## 2023-02-26 ASSESSMENT — PAIN DESCRIPTION - PAIN TYPE: TYPE: SURGICAL PAIN

## 2023-02-26 ASSESSMENT — PAIN DESCRIPTION - FREQUENCY: FREQUENCY: CONTINUOUS

## 2023-02-26 ASSESSMENT — PAIN DESCRIPTION - ORIENTATION
ORIENTATION: RIGHT

## 2023-02-26 ASSESSMENT — PAIN DESCRIPTION - DIRECTION: RADIATING_TOWARDS: RT HIP

## 2023-02-26 NOTE — PLAN OF CARE
Problem: Respiratory - Adult  Goal: Achieves optimal ventilation and oxygenation  2/26/2023 1233 by Kirby Vieira RCRAND  Outcome: Progressing  2/26/2023 1142 by Susanne Mark, RN  Outcome: Progressing  Flowsheets (Taken 2/26/2023 0915)  Achieves optimal ventilation and oxygenation:   Assess for changes in respiratory status   Assess for changes in mentation and behavior   Position to facilitate oxygenation and minimize respiratory effort   Oxygen supplementation based on oxygen saturation or arterial blood gases   Encourage broncho-pulmonary hygiene including cough, deep breathe, incentive spirometry   Assess the need for suctioning and aspirate as needed   Assess and instruct to report shortness of breath or any respiratory difficulty   Respiratory therapy support as indicated  2/26/2023 0431 by Cyndie Brittle, RN  Outcome: Progressing  Flowsheets (Taken 2/25/2023 2147)  Achieves optimal ventilation and oxygenation:   Assess for changes in respiratory status   Assess for changes in mentation and behavior

## 2023-02-26 NOTE — PROGRESS NOTES
Keven Davenport called [employer] stating patient has filed a discharge appeal with case number [id number]  Case Management informed.

## 2023-02-26 NOTE — PROGRESS NOTES
Haim Moncada [address] [address]  [facility]  Occupational Therapy  Daily Note  Time:   Time In: 1330  Time Out: 1430  Timed Code Treatment Minutes: 60 Minutes  Minutes: 60          Date: 2023  Patient Name: Emilee Turner,   Gender: female      Room: [facility]55/055-A  MRN: [id number]  : 1950  (81 y.o.)  Referring Practitioner: Teodora oDnato MD  Diagnosis: closed intertrochanteric fracture, right  Additional Pertinent Hx: The patient was recently hospitalized from 2023 to 2023 initially for abdominal bloating, nausea and leg swelling. She was found to have elevated D-dimer. Subsequent testing revealed right lower lobe pulmonary embolism and right lower extremity DVT. She was at initially treated with IV heparin and later transition to [address] [place] [address]. The patient was brought to ER on 2022 because of progressively increased leg swelling, and nausea/vomiting associated with decreased oral intake. She was found to have BNP of 256.7 and Bumex was prescribed. She then had a fall accident  when she was going to toilet while she was in ER on 2022. The fall resulted severe right hip pain. X-ray of right hip and pelvis revealed acute right femoral intertrochanteric fracture with subtrochanteric extension. Orthopedic service was consulted. Xarelto was held in preparation for surgical management. Cardiology was consulted on 2023 for elevated BNP and Lasix was started. Because of difficulty voiding with urinary retention, urology was also consulted on 2023. Urology service placed Wen with some difficulty on 2023. Therefore Wen was kept in place. On 2/3/2023 the patient underwent open treatment of right intertrochanteric fracture with cephalomedullary nail by Dr. Alon Serrato. She is allowed weightbearing as tolerated at the right lower extremity postoperatively.     Restrictions/Precautions:  Restrictions/Precautions: General Precautions, Fall Risk, Weight Bearing  Right Lower Extremity Weight Bearing: Weight Bearing As Tolerated  Position Activity Restriction  Other position/activity restrictions: Tracheostomy/collar 6L.  With venturi mask for out of room use 8 liters portable O2.     SUBJECTIVE: pt sitting in recliner when arrived. Attempted to see this am and she declined said had stomach ache  so did see pt this pm for ADLS.     PAIN: pain in stomach area     Vitals: Vitals not assessed per clinical judgement, see nursing flowsheet    COGNITION: Inattention, Decreased Problem Solving, and Decreased Safety Awareness    ADL:   Grooming: Supervision and with set-up.  Pt able to wash face and head with set up of basin  Bathing: Minimal Assistance.  Feet and bottom   Upper Extremity Dressing: Minimal Assistance.  To tie gown and snaps   Lower Extremity Dressing: Minimal Assistance.  To don socks and thread legs in depends   Footwear Management: Moderate Assistance.  To don slipper socks   Toileting: Maximum Assistance.  To wipe bottom   Toilet Transfer: Minimal Assistance. To BSC  .    BALANCE:  Standing Balance: Minimal Assistance. At RW     BED MOBILITY:  Not Tested    TRANSFERS:  Sit to stand: EDDIE  from commode and reciner   STAND TO SIT:  EDDIE to recliner and commode    FUNCTIONAL MOBILITY:  Assistive Device: Rolling Walker  Assist Level:  Minimal Assistance.   Distance:  from recliner to BSC   No LOB , poor posture at times with mobility      ADDITIONAL ACTIVITIES:  Patient completed dynamic standing task that facilitated endurance for standing longer. Patient required CGA , and demo'ed an endurance of 1 minutes x2 trials . Demo poor  tolerance with   Long  rest breaks. Standing task completed to challenge endurance and balance required for ADL and IADL skills.       ASSESSMENT:     Activity Tolerance:  Patient tolerance of  treatment: Fair treatment tolerance      Discharge Recommendations: Continue to assess pending progress  Equipment Recommendations:  Other: Monitor pending progress  Plan: Times Per Week: 5x per week for 90 minutes  Times Per Day: Once a day  Current Treatment Recommendations: Balance training, Functional mobility training, Endurance training, Self-Care / ADL, Safety education & training, Patient/Caregiver education & training, Strengthening    Patient Education  Patient Education: ADL's    Goals  Short Term Goals  Time Frame for Short Term Goals: until discharge  Short Term Goal 1: Pt will tolerate 12-15 min EOB ADL task with S to improve trunk control and activity tolerance required for EOB ADLs. Short Term Goal 2: Pt will complete sit-stand t/fs with Min A x 1 with minimal cues of technique to progress towards BSC t/fs  Short Term Goal 3: Pt will tolerate standing 3 min with CGA for increased ease of clothing management nad LB bathing routine  Short Term Goal 4: Pt will safely navigate short distances with no > than Min A x1 and min VC for safety to increase indep with ADLs  Long Term Goals  Time Frame for Long Term Goals : 2 weeks from IPR OT eval  Long Term Goal 1: Pt will complete sit-stand t/fs with SBA x 1 with minimal cues of technique to progress towards BSC t/fs  Long Term Goal 2: Pt will perform UB/LB dressing and bathing with Min A and minimal cues for ECT to improve functional independence. Following session, patient left in safe position with all fall risk precautions in place.

## 2023-02-26 NOTE — PLAN OF CARE
Problem: Chronic Conditions and Co-morbidities  Goal: Patient's chronic conditions and co-morbidity symptoms are monitored and maintained or improved  Outcome: Progressing  Flowsheets (Taken 2/25/2023 2147)  Care Plan - Patient's Chronic Conditions and Co-Morbidity Symptoms are Monitored and Maintained or Improved: Monitor and assess patient's chronic conditions and comorbid symptoms for stability, deterioration, or improvement     Problem: Skin/Tissue Integrity  Goal: Absence of new skin breakdown  Description: 1. Monitor for areas of redness and/or skin breakdown  2. Assess vascular access sites hourly  3. Every 4-6 hours minimum:  Change oxygen saturation probe site  4. Every 4-6 hours:  If on nasal continuous positive airway pressure, respiratory therapy assess nares and determine need for appliance change or resting period.   Outcome: Progressing     Problem: ABCDS Injury Assessment  Goal: Absence of physical injury  Outcome: Progressing     Problem: Safety - Adult  Goal: Free from fall injury  Outcome: Progressing     Problem: Metabolic/Fluid and Electrolytes - Adult  Goal: Electrolytes maintained within normal limits  Outcome: Progressing  Flowsheets (Taken 2/25/2023 2147)  Electrolytes maintained within normal limits: Monitor labs and assess patient for signs and symptoms of electrolyte imbalances     Problem: Metabolic/Fluid and Electrolytes - Adult  Goal: Hemodynamic stability and optimal renal function maintained  Outcome: Progressing  Flowsheets (Taken 2/25/2023 2147)  Hemodynamic stability and optimal renal function maintained:   Monitor labs and assess for signs and symptoms of volume excess or deficit   Monitor intake, output and patient weight     Problem: Metabolic/Fluid and Electrolytes - Adult  Goal: Glucose maintained within prescribed range  Outcome: Progressing  Flowsheets (Taken 2/25/2023 2147)  Glucose maintained within prescribed range:   Monitor blood glucose as ordered   Assess for signs and symptoms of hyperglycemia and hypoglycemia     Problem: Respiratory - Adult  Goal: Achieves optimal ventilation and oxygenation  Outcome: Progressing  Flowsheets (Taken 2/25/2023 2147)  Achieves optimal ventilation and oxygenation:   Assess for changes in respiratory status   Assess for changes in mentation and behavior

## 2023-02-26 NOTE — DISCHARGE SUMMARY
Physical Medicine & Rehabilitation   Discharge Summary     Patient Identification:  Viviana Moon  : 1950  Admit date: 2/10/2023  Discharge date: 3/2/2023   Attending provider: Alisha Quiroz MD        Primary care provider: Nery Newby MD     Discharge Diagnoses:   Right femoral intertrochanteric fracture with subtrochanteric extension due to fall requiring open reduction and internal fixation with cephalomedullary nail  Glottic stenosis requiring tracheostomy  Persistent chronic low back pain due to lumbar and lower thoracic spine spondylosis with degenerative facet arthropathy at the lower 3 lumbar levels, and acute L2-S1 endplate fractures  Right posterior tibial, left greater saphenous, left peroneal and left anterior tibial veins deep vein thrombosis, and right lower lobe pulmonary embolism  Decreased appetite, nausea and belching   Acute anemia requiring blood transfusion  Urinary retention due to urethra obstruction requiring Wen cath placement   Candida albicans UTI (treated)  Nausea of uncertain cause  Lumbar spine osteoporosis  Gluteal region decubitus ulcer  Diabetes mellitus type 2 with poor blood glucose control and complicated by bilateral lower extremities diabetic polyneuropathy  Morbid obesity  Hypertension  Hyperlipidemia  History of lower back pain with history of \"lumbar stress fracture\"  History of COVID infection with pneumonia  History of mild cognitive impairment  History of coronary artery disease requiring coronary artery stenting  Grade 1 left ventricular diastolic dysfunction with preserved ejection fraction       Discharge Functional Status:    Physical therapy:  Bed Mobility:  Rolling to Left: Stand By Assistance, with rail, with verbal cues , to assume hooklying BLEs prior to start of roll. Once cued, Mod I.   Rolling to Right: Modified Independent, with rail   Supine to Sit: Modified Independent, with rail, coming up from LT side.      Transfers:  Sit to Stand: Contact Guard Assistance  Stand to [facility] 68 x3 trials performed during session. To/From Bed and Chair: [employer], with RW, 4 ft. Wheelchair Mobility:  Modified Independent  Extremities Used: Bilateral Upper Extremities  Type of Chair:Manual  Surface: Level Tile  Distance: 150 ft  Quality: Slow Velocity and Short Strokes      Balance:Dynamic  Standing Balance: Contact Guard Assistance, with RW support during pericare and clothing management. PT Equipment Recommendations  Equipment Needed: No  Other: Pt has RW, manual w/c and mechanical lift device, Assessment: Pt presents with a decline in baseline by way of bed mobility, transfers and currently unable to walk due to recent fall resulting in Rt intertrochanteric fx and acute fs's L2-S1. These have caused increased pain, decreased strength, endurance and balance resulting in need for max assist with bed mobility, +2 assist with transfers into a transfer device for bed <->chair transfers. Pt will benefit from skilled PT to advance in these areas for improved functional mobility and gait progression. Occupational therapy:  ADL:  EATING:Independent. Michelle Linares CARE Score: 6. ORAL HYGIENE:Setup or clean-up assistance. seated. CARE Score: 5.     TOILETING HYGIENE:Partial/moderate assistance. Mod A clothing management and for flaquito care with increased cues to engage. Michelle Linares CARE Score: 3. SHOWERING/BATHING:Partial/moderate assistance. Pt. required assistance with under belly folds, distal BLE, bottom. Pt. instructed to engage in task to best of ability. Michelle Linares CARE Score: 3.     UPPER BODY DRESSING:Setup or clean-up assistance. to don pull over dress. CARE Score: 5. LOWER BODY DRESSING:Partial/moderate assistance. Overall Mod A. CARE Score: 3. FOOTWEAR:Partial/moderate assistance  Mod A. CARE Score: 3.     TOILET TRANSFER: Supervision or touching assistance. close SBA to CGA to/from [facility]. CARE Score: 4. BALANCE:  Sitting Balance:  Modified Independent. Standing Balance: Stand By Assistance, [employer]. varied     TRANSFERS:  Sit <-> Stand:  Close Stand By Assistance, [employer]. FUNCTIONAL MOBILITY:  Assistive Device: Rolling Walker  Assist Level: Close SBA- CGA . Distance: within room     ADDITIONAL ACTIVITIES:  BIMs assessment and CAM completed with pt this date. Pt. Instructed to engage in IADL task of locating items to prepare an egg and completed at seated level and able to complete with SBA when reaching OBOS and able to complete task at seated level, cues required for w/c placement to improve overall safety in the kitchen. Pt. Instructed to complete BUE HEP with use of teachback method, pt able to tolerate 15 reps in all joints/planes with 80% accuracy. Task performed to further advance I with ADL task completion. Equipment Recommendations: Other: Monitor pending progress    Assessment: Remington Edmonds is making slow steady progress towards therapy goals during stay on IPR thus far, meeting 2/5 STGs. She has made progression in UB dressing with ability to complete with set up while seated upright. She continues to require maxA for LB dressing/bathing tasks. Remington Edmonds continues to be dep for footwear mgmt. Remington Edmonds currently requires Mod A for toileting hygiene, requiring multiple standing trials d/t fatigue and A with hygiene and has demoed improvements in indep with clothing mgmt. Remington Edmonds has progressed to primary use of stand pivot t/fs and short distance functional mobility with Min A x1 and increased time. Pt continues to be highly limited by decreased endurance and requiring frequent rest breaks. She continues to exhibit decreased strength, activity tolerance, increase of pain in RLE with movement, increased need for assist during BADL routine which limits her from meeting her goals. Remington Edmonds is typically independent with BADL routine with SBA from  for safety measures. She is currently using 6L via trach mask. Pt continues to require skilled OT intervention to improve strength, activity tolerance, safety awareness, ADL/IADL performance required for pt to return [facility]. Without skilled services patient is at risk for falls, decrease in overall function and increased caregiver burden. Speech therapy:  Time Management - Bank hours  3/6 independent, 3/6 given min-mod cues     *patient with decreased mental math computations of time variables; increased success given cuing  *patient with good visual scanning and thought organization on task this date     [employer] Card Game - [id number] in [place] [facility]  Patient with good execution of game this date. Patient with decreased recall; required cuing to replace slot with card from hand. Patient with good visual scanning and attention to detail throughout game. [employer] Card Game - [employer] in [place] [facility]  Patient with overall good execution of game this date. Patient with decreased visual scanning and attention to detail throughout game; improved success given min cues. Complex Money Management - Making Change  12/15 independent, 3/15 given min cues     *patient with good math computations via pen/paper; required min cuing to check amounts with independent self correction after cuing  *overall good success with task this date    Recall of [place] Game - [id number] in [place] [facility]  (Name, setup, how to play, how to win)  Delayed (~1 day): 4/4 independent     Sequencing 5 step ADL/IADLs  4/9 independent, 4/9 given min cues, 1/9 given mod verbal cues     *patient with decreased attention to detail; required cuing to check other steps; self correction after given cue with good reasoning for sequencing  *patient with increased time to complete task this date     Divided Attention  ST flipping one card from deck at a time. Patient prompted to say \"elephant\" for even cards, \"octopus\" for odd cards, and \"lehman\" for face cards.       *patient required max cues to recall corresponding animal to given card  *patient with x5 errors with x4 self corrections in 6:25 minutes  *patient with increased time to complete task this date d/t increased fatigue  *patient with good utilization of self-talk throughout task    Comprehension: 5 - Patient understands basic needs (hungry/hot/pain)  Expression: 5 - Expresses basic ideas/needs only (hungry/hot/pain)  Social Interaction: 5 - Patient is appropriate with supervision/cues  Problem Solvin - Patient solves simple/routine tasks 75-90%+   Memory: 5 - Patient requires prompting with stress/unfamiliar situations      Inpatient Rehabilitation Course:   Whitney Mcgee is a 81 y.o. right-handed morbid obese  female with history of hypertension, diabetes mellitus type 2 with bilateral lower extremities diabetic neuropathy, coronary artery disease requiring coronary artery stenting, recently diagnosed (2023) right lower extremity DVT and right lower lobe pulmonary embolism requiring anticoagulation therapy with Xarelto, COVID-pneumonia, mild impaired cognition, low back pain due to \"stress fracture\", status post bilateral eyes cataract surgeries, status post 3  sections, status post hysterectomy, status post hiatal hernia repair, status post sinus surgery, status post tracheostomy on 2022 for glottic stenosis, was admitted to inpatient rehabilitation on 2/10/2023 for intensive inpatient management of impairment & disability secondary to right hip femur intertrochanteric fracture due to fall on 2022 requiring ORIF on 2/3/2023. The patient participated in an aggressive multidisciplinary inpatient rehabilitation program involving 3 hours per day, 5 days per week of rehabilitation. Diabetic management was maximized with diet and medication options to attempt to achieve blood sugar control between .         Hypertension management was undertaken with medication management on any patient with systolic blood pressure greater than 000 or diastolic blood pressure greater than 90. Hyperlipidemia was considered and the patient was started or continued on a statin medication for any LDL>100. Appropriate DVT prophylaxis options were considered throughout rehabilitation stay. Dr Loree Ibanez followed during the IPR stay for medical management    Because the patient developed urinary bladder cramping pain with reduced urinary output and leakage around the Wen catheter, urology service was contacted and they found the Wen catheter was blocked. The catheter was irrigated and the symptoms improved. Because oxycodone usage caused stomach discomfort sensation with nauseous feeling and poor appetite, oxycodone was discontinued and tramadol was used for pain control. The patient's inpatient rehab course also complicated by Candida albicans UTI requiring 2-week course of Diflucan. Her right hip, right lower back and right upper thigh painful symptom later was controlled with addition of Voltaren gel application. Patient's also developed diarrhea which later improved during her later half of her rehab stay. The patient also complained of intermittent belching, nausea with poor appetite. GI was consulted on 3/1/2023 and Carafate was started. Otherwise her inpatient rehab course was uneventful. She tolerated the intensive inpatient rehabilitation treatment well. She gained functionally improvement in performing ADLs and ambulation with increased independence and improving tolerance but with very slow speed. [facility] subacute rehab program placement for further rehab intervention was recommended. She was initially projected to be ready for discharge to a SNF subacute rehab program on 2/27/2023. The family filed appeal to insurance for continuing stay at acute rehab facility but it was denies on 3/1/2023.     Patient was discharged [facility] in Stable condition for subacute rehab program.      Consults:   Urology and GI      Significant Diagnostics:   CBC with Differential:    Lab Results   Component Value Date/Time    WBC 8.7 03/01/2023 07:30 AM    RBC 3.18 03/01/2023 07:30 AM    HGB 9.0 03/01/2023 07:30 AM    HCT 28.9 03/01/2023 07:30 AM     03/01/2023 07:30 AM    MCV 90.9 03/01/2023 07:30 AM    MCH 28.3 03/01/2023 07:30 AM    MCHC 31.1 03/01/2023 07:30 AM    NRBC 0 03/01/2023 07:30 AM    SEGSPCT 68.6 03/01/2023 07:30 AM    MONOPCT 10.4 03/01/2023 07:30 AM    MONOSABS 0.9 03/01/2023 07:30 AM    LYMPHSABS 1.6 03/01/2023 07:30 AM    EOSABS 0.1 03/01/2023 07:30 AM    BASOSABS 0.0 03/01/2023 07:30 AM    DIFFTYPE see below 02/06/2023 05:40 AM     CMP:    Lab Results   Component Value Date/Time     03/02/2023 06:00 AM    K 3.1 03/02/2023 06:00 AM    K 3.4 02/28/2023 05:00 PM    CL 99 03/02/2023 06:00 AM    CO2 25 03/02/2023 06:00 AM    BUN 16 03/02/2023 06:00 AM    CREATININE 0.7 03/02/2023 06:00 AM    LABGLOM >60 03/02/2023 06:00 AM    GLUCOSE 51 03/02/2023 06:00 AM    PROT 5.0 02/15/2023 11:15 PM    LABALBU 2.7 02/15/2023 11:15 PM    CALCIUM 8.5 03/02/2023 06:00 AM    BILITOT 0.4 02/15/2023 11:15 PM    ALKPHOS 131 02/15/2023 11:15 PM    AST 14 02/15/2023 11:15 PM    ALT <5 02/15/2023 11:15 PM     BMP:    Lab Results   Component Value Date/Time     03/02/2023 06:00 AM    K 3.1 03/02/2023 06:00 AM    K 3.4 02/28/2023 05:00 PM    CL 99 03/02/2023 06:00 AM    CO2 25 03/02/2023 06:00 AM    BUN 16 03/02/2023 06:00 AM    LABALBU 2.7 02/15/2023 11:15 PM    CREATININE 0.7 03/02/2023 06:00 AM    CALCIUM 8.5 03/02/2023 06:00 AM    LABGLOM >60 03/02/2023 06:00 AM    GLUCOSE 51 03/02/2023 06:00 AM        Recent Labs     03/01/23  0351 03/01/23  1638 03/02/23  0712   POCGLU 95 104 63*       Cholesterol Panel:   No results found for requested labs within last 30 days.        Urine culture (2/14/2023) :  Component 2/14/23 1445    Organism Candida albicans Abnormal     Urine Culture Reflex [place] count: >100,000 CFU/mL          Patient Instructions:      Follow-up visits: See after visit summary from hospitalization         Follow up with Dr. Hayder Alvarez MD  Specialty: Family Medicine [address]   [facility] [address]  [phone]          Follow up with Dr. Josr Bowman MD  Specialty: Urology [address]   [address]   [facility] [address]  [phone]          Follow up with Dr. Esvin Aragon MD in 2 week(s)  Specialty: Orthopedic Surgery [facility]  [address]   [address]   [facility] [address]  [phone]          Follow up with Dr. Anusha Rhodes MD in 2 week(s)  Specialty: Gastroenterology [facility]  [address]   [facility] [address]  [phone]   Mar 9 NEW TO PROVIDER with WYATT Nguyễn CNP  Thursday Mar 9, 2023 1:00 PM [facility]  [address]   [address]   [facility] [address]  [phone]       Discharge Medications:  Current Discharge Medication List             Details   rivaroxaban (XARELTO) 20 MG TABS tablet Take 1 tablet by mouth daily For bilateral lower extremities DVT  Qty: 30 tablet, Refills: 2      traZODone (DESYREL) 100 MG tablet Take 1 tablet by mouth nightly For insomnia  Qty: 30 tablet, Refills: 2      lactobacillus (CULTURELLE) capsule Take 1 capsule by mouth daily (with breakfast)  Qty: 30 capsule, Refills: 2      loperamide (IMODIUM) 2 MG capsule Take 1 capsule by mouth 4 times daily as needed for Diarrhea  Qty: 60 capsule, Refills: 2      promethazine (PHENERGAN) 25 MG tablet Take 1 tablet by mouth every 6 hours as needed for Nausea  Qty: 90 tablet, Refills: 1      megestrol (MEGACE) 40 MG tablet Take 1 tablet by mouth daily  Qty: 30 tablet, Refills: 3      QUEtiapine (SEROQUEL) 25 MG tablet Take 1 tablet by mouth nightly  Qty: 60 tablet, Refills: 3      diclofenac sodium (VOLTAREN) 1 % GEL Apply 2 g topically 4 times daily as needed for Pain  Qty: 150 g, Refills: 3      miconazole (MICOTIN) 2 % powder  Apply topically 2 times daily. Qty: 45 g, Refills: 1      furosemide (LASIX) 40 MG tablet Take 1 tablet by mouth daily  Qty: 60 tablet, Refills: 3      metOLazone (ZAROXOLYN) 2.5 MG tablet Take 1 tablet by mouth daily  Qty: 30 tablet, Refills: 3      folic acid (FOLVITE) 1 MG tablet Take 1 tablet by mouth daily  Qty: 30 tablet, Refills: 3      melatonin 3 MG TABS tablet Take 2 tablets by mouth at bedtime  Qty: 60 tablet, Refills: 3      simethicone (MYLICON) 80 MG chewable tablet Take 1 tablet by mouth every 6 hours as needed for Flatulence  Qty: 180 tablet, Refills: 3      Multiple Vitamin (MULTIVITAMIN) TABS tablet Take 1 tablet by mouth daily  Qty: 30 tablet, Refills: 3      sucralfate (CARAFATE) 1 GM tablet Take 1 tablet by mouth 4 times daily (before meals and nightly)  Qty: 120 tablet, Refills: 3      potassium chloride (KLOR-CON M) 20 MEQ extended release tablet Take 1 tablet by mouth 2 times daily for 2 doses  Qty: 180 tablet, Refills: 1                Details   insulin glargine (LANTUS SOLOSTAR) 100 UNIT/ML injection pen Inject 20 Units into the skin nightly  Qty: 5 Adjustable Dose Pre-filled Pen Syringe, Refills: 3      senna (SENOKOT) 8.6 MG tablet Take 2 tablets by mouth daily      traMADol (ULTRAM) 50 MG tablet Take 0.5-1 tablets by mouth every 6 hours as needed for Pain (take 1/2 tab for pain level 4~6/10 ; take 1 tab for pain level 7~10/10) for up to 7 days.  Max Daily Amount: 200 mg  Qty: 28 tablet, Refills: 0    Comments: Reduce doses taken as pain becomes manageable  Associated Diagnoses: Closed intertrochanteric fracture, right, initial encounter ([address]                Details   !! D3-50 1.25 MG (59827 UT) CAPS TAKE 1 CAPSULE BY MOUTH ONCE A WEEK      pantoprazole (PROTONIX) 40 MG tablet TAKE 1 BY MOUTH ONCE DAILY BEFORE A MEAL      nystatin (MYCOSTATIN) 217316 UNIT/GM powder APPLY POWDER TOPICALLY TWICE DAILY UNDER BOTH BREASTS      Benzocaine-Menthol (CEPACOL) 15-2.3 MG LOZG Take 1 lozenge by mouth every 3 hours as needed (sore throat, pain)  Qty: 16 lozenge, Refills: 1      Insulin Pen Needle 31G X 6 MM MISC 1 each by Does not apply route daily  Qty: 100 each, Refills: 3      !! Cholecalciferol (VITAMIN D3 PO) Take 50,000 Units by mouth every 30 days 4000 units      guaiFENesin 400 MG tablet Take 400 mg by mouth 2 times daily as needed for Cough      metoprolol succinate (TOPROL XL) 25 MG extended release tablet Take 1 tablet by mouth daily  Qty: 30 tablet, Refills: 3      albuterol sulfate  (90 Base) MCG/ACT inhaler Inhale 2 puffs into the lungs every 6 hours as needed for Wheezing  Qty: 18 g, Refills: 3      rosuvastatin (CRESTOR) 10 MG tablet Take 10 mg by mouth daily      aspirin 81 MG chewable tablet Take 81 mg by mouth daily       ! ! - Potential duplicate medications found. Please discuss with provider. Controlled substances monitoring: possible medication side effects, risk of tolerance and/or dependence, and alternative treatments discussed.      45 minutes spent preparing the patient for discharge    Evelyn Ballard MD

## 2023-02-26 NOTE — PLAN OF CARE
Problem: Chronic Conditions and Co-morbidities  Goal: Patient's chronic conditions and co-morbidity symptoms are monitored and maintained or improved  2/26/2023 1142 by Anjelica Wiley RN  Outcome: Progressing  Flowsheets (Taken 2/26/2023 0915)  Care Plan - Patient's Chronic Conditions and Co-Morbidity Symptoms are Monitored and Maintained or Improved:   Monitor and assess patient's chronic conditions and comorbid symptoms for stability, deterioration, or improvement   Collaborate with multidisciplinary team to address chronic and comorbid conditions and prevent exacerbation or deterioration   Update acute care plan with appropriate goals if chronic or comorbid symptoms are exacerbated and prevent overall improvement and discharge  2/26/2023 0431 by Sakina Salcido RN  Outcome: Progressing  [address] (Taken 2/25/2023 2147)  Care Plan - Patient's Chronic Conditions and Co-Morbidity Symptoms are Monitored and Maintained or Improved: Monitor and assess patient's chronic conditions and comorbid symptoms for stability, deterioration, or improvement     Problem: Discharge Planning  Goal: Discharge to home or other facility with appropriate resources  2/26/2023 1142 by Anjelica Wiley RN  Outcome: Progressing  Flowsheets (Taken 2/26/2023 0915)  Discharge to home or other facility with appropriate resources:   Identify barriers to discharge with patient and caregiver   Arrange for needed discharge resources and transportation as appropriate   Identify discharge learning needs (meds, wound care, etc)   Refer to discharge planning if patient needs post-hospital services based on physician order or complex needs related to functional status, cognitive ability or social support system  2/26/2023 0431 by Sakina Salcido RN  Outcome: Progressing  Flowsheets (Taken 2/25/2023 2147)  Discharge to home or other facility with appropriate resources: Identify barriers to discharge with patient and caregiver     Problem: Pain  Goal: Verbalizes/displays adequate comfort level or baseline comfort level  Outcome: Progressing   Today pain well controlled with tylenol and voltaren gel  Problem: Metabolic/Fluid and Electrolytes - Adult  Goal: Electrolytes maintained within normal limits  2/26/2023 1142 by Antolin Pizarro RN  Outcome: Progressing  Flowsheets (Taken 2/26/2023 0915)  Electrolytes maintained within normal limits: Monitor labs and assess patient for signs and symptoms of electrolyte imbalances  2/26/2023 0431 by Marek Thompson RN  Outcome: Progressing  Flowsheets (Taken 2/25/2023 2147)  Electrolytes maintained within normal limits: Monitor labs and assess patient for signs and symptoms of electrolyte imbalances  Goal: Hemodynamic stability and optimal renal function maintained  2/26/2023 1142 by Antolin Pizarro RN  Outcome: Progressing  Flowsheets (Taken 2/26/2023 0915)  Hemodynamic stability and optimal renal function maintained:   Monitor labs and assess for signs and symptoms of volume excess or deficit   Instruct patient on fluid and nutrition restrictions as appropriate  2/26/2023 0431 by Marek Thompson RN  Outcome: Progressing  Flowsheets (Taken 2/25/2023 2147)  Hemodynamic stability and optimal renal function maintained:   Monitor labs and assess for signs and symptoms of volume excess or deficit   Monitor intake, output and patient weight  Goal: Glucose maintained within prescribed range  2/26/2023 1142 by Antolin Pizarro RN  Outcome: Progressing  Flowsheets (Taken 2/26/2023 0915)  Glucose maintained within prescribed range: Monitor blood glucose as ordered  2/26/2023 0431 by Marek Thompson RN  Outcome: Progressing  Flowsheets (Taken 2/25/2023 2147)  Glucose maintained within prescribed range:   Monitor blood glucose as ordered   Assess for signs and symptoms of hyperglycemia and hypoglycemia     Problem: Respiratory - Adult  Goal: Achieves optimal ventilation and oxygenation  2/26/2023 1142 by Antolin Pizarro RN  Outcome: Progressing  Flowsheets (Taken 2/26/2023 0915)  Achieves optimal ventilation and oxygenation:   Assess for changes in respiratory status   Assess for changes in mentation and behavior   Position to facilitate oxygenation and minimize respiratory effort   Oxygen supplementation based on oxygen saturation or arterial blood gases   Encourage broncho-pulmonary hygiene including cough, deep breathe, incentive spirometry   Assess the need for suctioning and aspirate as needed   Assess and instruct to report shortness of breath or any respiratory difficulty   Respiratory therapy support as indicated  2/26/2023 0431 by Nora Yip RN  Outcome: Progressing  Flowsheets (Taken 2/25/2023 2147)  Achieves optimal ventilation and oxygenation:   Assess for changes in respiratory status   Assess for changes in mentation and behavior

## 2023-02-26 NOTE — PROGRESS NOTES
[facility] PHYSICAL THERAPY  DAILY NOTE  [facility]- [address] [facility],[address] - [id number]    Time In: 1030  Time Out: 1100  Timed Code Treatment Minutes: 30 Minutes  Minutes: 30          Date: 2023  Patient Name: Keith Ayala,  Gender:  female        MRN: [id number]  : 1950  (81 y.o.)     Referring Practitioner: Pawan Tripp MD  Diagnosis: closed intertrochanteric fx, Rt  Additional Pertinent Hx: Jhon Paz is a 73yoF with PMH of recent PE and b/l DVT, macrocytic anemia, CAD, DM2, tracheostomy dependent, obesity, and HTN who presents for leg swelling, nausea, and constipation. She was recently discharged after an 8 day admission for DVTs and PEs. She was placed on Xarelto when discharged and stated that she has been compliant and only missed one dose. She returned due to chronic constipation, feeling ''bloated'', nausea, and LE edema. While being evaluated in the ED the pt had a mechanical fall and developed a R femur fracture. She was found to have supratheraputic INR at 3.83. CT head and C-spine both clear. Pt is s/p R femur ORIF by Dr Juan David Hernandez . Pt found to have acute fractures of L2-S1 likely 2/2 osteoporosis, ok for activity as tolerated per Dr Nery Moseley . Prior Level of Function:  Lives With: Spouse  Type of Home: House  Home Layout: One level  Home Access: Ramped entrance (or 1 step with no rails.)  Home Equipment: Angela Caulk, rolling, Lift chair (Pt sleeps in lift chair and was using it to assist up to stand PTA)   Bathroom Shower/Tub: Walk-in shower  Bathroom Toilet: Standard  Bathroom Equipment: Built-in shower seat, 3-in-1 commode    ADL Assistance: Needs assistance  Homemaking Assistance: Needs assistance  Ambulation Assistance: Independent  Transfer Assistance: Independent  Active : No  Additional Comments: pt amb short distances in the home with RW,  available / and able to assist as needed.  Has a passi valve that she uses at rest.    Restrictions/Precautions:  Restrictions/Precautions: General Precautions, Fall Risk, Weight Bearing  Right Lower Extremity Weight Bearing: Weight Bearing As Tolerated  Position Activity Restriction  Other position/activity restrictions: Tracheostomy/collar 6L. With venturi mask for out of room use 8 liters portable O2. SUBJECTIVE: Patient supine in bed upon PTA arrival. Patient pleasant however, she continues to c/o nausea and diarrhea this date. Patient incontinent of BM while laying in bed and agreeable to transition onto [facility]. Patient verbalizing wanting to use the Chad Clink however, with encouragement, patient agreeable to stand pivot transfer with RW. IRF completed this date. Gait and w/c mobility not attempted this date due to medical condition and safety concerns secondary to patient with limited tolerance to activity due to patient not feeling well with stomach pain, nausea and diarrhea. PAIN: 8/10: stomach pain. Vitals: Vitals not assessed per clinical judgement, see nursing flowsheet    OBJECTIVE:  Bed Mobility:  Supine to Sit: Moderate Assistance, with HOB slightly elevated ~15 degrees. She requires Rocco to scoot LES towards EOB and ModA for trunk to achieve sitting EOB. Scooting: Patient demonstrates difficulty scooting EOB requiring increased time and verbal cues to achieve. Patient requires increased time for functional tasks. Transfers:  Sit to Stand: Contact Guard Assistance  Stand to [address], Minimal Assistance, with increased time for completion, verbal cues for increased posture throughout transfer with patient heavy reliance on RW with patient leaning fwd placing forearms on RW. Patient unable to correct posture throughout stand pivot transfer. Patient requires increased time for functional tasks. Ambulation:  Patient ambulates ~3 feet from [facility] to  chair.  Patient utilizing RW with patient demonstrating increased fwd flexion with patient resting forearms onto RW, decreased step height, decreased step length, decreased foot clearance with patient requiring increased time to complete task. She requires CGA for increased safety with RN present as needed throughout therapy treatment for increased safety and decreased risk of falls. Balance:  Static Standing Balance: Contact Guard Assistance, for posterior pericare. Patient with heavy reliance of forearms on RW for standing support. Exercise:  None    Functional Outcome Measures: Not completed       ASSESSMENT:  Assessment: Patient progressing toward established goals. Activity Tolerance:  Patient tolerance of  treatment: good. Equipment Recommendations:Equipment Needed: No  Other: Pt has RW, manual w/c and mechanical lift device  Discharge Recommendations: Continue to assess pending progress  Plan: Current Treatment Recommendations: Strengthening, Balance training, Functional mobility training, Transfer training, Endurance training, Equipment evaluation, education, & procurement, Patient/Caregiver education & training, Safety education & training, Therapeutic activities, Home exercise program, Wheelchair mobility training, Gait training, Pain management  General Plan:  (5x/ wk 90 min)    Patient Education  Patient Education: Plan of Care, Bed Mobility, Transfers, Gait,  - Patient Verbalized Understanding    Goals:  Patient Goals : get around o my own without the RW  Short Term Goals  Time Frame for Short Term Goals: 1 wk  Short Term Goal 1: Pt will go from supine <->sit, mod +1 to get in/out of bed. Short Term Goal 2: Pt will get up/down from bed, into JEANNETTE stedy device, +1 mod assist to progress to up to RW GOAL MET, SEE LTG  Short Term Goal 3: Pt will  the JEANNETTE stedy device, +1 min assist x5 min, to progress to standing with RW  Short Term Goal 4: Pt will propel w/c >= 50 ft, tile surface, CGA for home mobility.  GOAL MET, SEE LTG  Long Term Goals  Time Frame for Long Term Goals : 3 wks from evaluation. Long Term Goal 1: Pt to go supine <->sit, min +1 to get in/out of bed. Long Term Goal 2: Pt to get up/down from bed or w/c +1 min assist to get up to walk. Long Term Goal 3: Pt to perform stand/pivot transfer with RW, +1 min assist to get in/out of w/c. Long Term Goal 4: Pt to walk with RW >= 10 ft, mn +1 to progress to home mobility  Long Term Goal 5: Pt to propel w/c >= 50 ft, Mod I, for home mobility  Additional Goals?: Yes  Long term goal 6: Pt to get in/out of car, min +1 for transportation needs. Following session, patient left in safe position with all fall risk precautions in place.

## 2023-02-27 LAB
GLUCOSE BLD STRIP.AUTO-MCNC: 112 MG/DL (ref 70–108)
GLUCOSE BLD STRIP.AUTO-MCNC: 127 MG/DL (ref 70–108)

## 2023-02-27 PROCEDURE — 97530 THERAPEUTIC ACTIVITIES: CPT

## 2023-02-27 PROCEDURE — 6370000000 HC RX 637 (ALT 250 FOR IP): Performed by: NURSE PRACTITIONER

## 2023-02-27 PROCEDURE — 1180000000 HC REHAB R&B

## 2023-02-27 PROCEDURE — 97110 THERAPEUTIC EXERCISES: CPT

## 2023-02-27 PROCEDURE — 2580000003 HC RX 258: Performed by: PHYSICAL MEDICINE & REHABILITATION

## 2023-02-27 PROCEDURE — 97542 WHEELCHAIR MNGMENT TRAINING: CPT

## 2023-02-27 PROCEDURE — 94760 N-INVAS EAR/PLS OXIMETRY 1: CPT

## 2023-02-27 PROCEDURE — 97535 SELF CARE MNGMENT TRAINING: CPT

## 2023-02-27 PROCEDURE — 97130 THER IVNTJ EA ADDL 15 MIN: CPT

## 2023-02-27 PROCEDURE — 99232 SBSQ HOSP IP/OBS MODERATE 35: CPT | Performed by: PHYSICAL MEDICINE & REHABILITATION

## 2023-02-27 PROCEDURE — 97129 THER IVNTJ 1ST 15 MIN: CPT

## 2023-02-27 PROCEDURE — 6370000000 HC RX 637 (ALT 250 FOR IP): Performed by: PHYSICAL MEDICINE & REHABILITATION

## 2023-02-27 PROCEDURE — 2700000000 HC OXYGEN THERAPY PER DAY

## 2023-02-27 PROCEDURE — 82948 REAGENT STRIP/BLOOD GLUCOSE: CPT

## 2023-02-27 PROCEDURE — 6370000000 HC RX 637 (ALT 250 FOR IP): Performed by: FAMILY MEDICINE

## 2023-02-27 RX ADMIN — PANTOPRAZOLE SODIUM 40 MG: 40 TABLET, DELAYED RELEASE ORAL at 06:01

## 2023-02-27 RX ADMIN — ACETAMINOPHEN 650 MG: 325 TABLET ORAL at 15:10

## 2023-02-27 RX ADMIN — FUROSEMIDE 40 MG: 40 TABLET ORAL at 08:18

## 2023-02-27 RX ADMIN — Medication 1 TABLET: at 08:18

## 2023-02-27 RX ADMIN — QUETIAPINE FUMARATE 25 MG: 25 TABLET ORAL at 20:55

## 2023-02-27 RX ADMIN — METOLAZONE 2.5 MG: 2.5 TABLET ORAL at 08:17

## 2023-02-27 RX ADMIN — RIVAROXABAN 20 MG: 20 TABLET, FILM COATED ORAL at 17:21

## 2023-02-27 RX ADMIN — MICONAZOLE NITRATE: 20 POWDER TOPICAL at 00:24

## 2023-02-27 RX ADMIN — FOLIC ACID 1 MG: 1 TABLET ORAL at 08:17

## 2023-02-27 RX ADMIN — ROSUVASTATIN 10 MG: 10 TABLET, FILM COATED ORAL at 20:55

## 2023-02-27 RX ADMIN — METOPROLOL SUCCINATE 25 MG: 25 TABLET, FILM COATED, EXTENDED RELEASE ORAL at 08:17

## 2023-02-27 RX ADMIN — SODIUM CHLORIDE, PRESERVATIVE FREE 10 ML: 5 INJECTION INTRAVENOUS at 08:20

## 2023-02-27 RX ADMIN — DICLOFENAC SODIUM 2 G: 10 GEL TOPICAL at 13:36

## 2023-02-27 RX ADMIN — ACETAMINOPHEN 650 MG: 325 TABLET ORAL at 08:18

## 2023-02-27 RX ADMIN — SODIUM CHLORIDE, PRESERVATIVE FREE 10 ML: 5 INJECTION INTRAVENOUS at 20:58

## 2023-02-27 RX ADMIN — DICLOFENAC SODIUM 2 G: 10 GEL TOPICAL at 08:19

## 2023-02-27 RX ADMIN — INSULIN GLARGINE 20 UNITS: 100 INJECTION, SOLUTION SUBCUTANEOUS at 21:04

## 2023-02-27 RX ADMIN — DICLOFENAC SODIUM 2 G: 10 GEL TOPICAL at 20:54

## 2023-02-27 RX ADMIN — SODIUM CHLORIDE, PRESERVATIVE FREE 10 ML: 5 INJECTION INTRAVENOUS at 00:25

## 2023-02-27 RX ADMIN — Medication 6 MG: at 20:55

## 2023-02-27 RX ADMIN — DICLOFENAC SODIUM 2 G: 10 GEL TOPICAL at 17:21

## 2023-02-27 RX ADMIN — MICONAZOLE NITRATE: 20 POWDER TOPICAL at 20:57

## 2023-02-27 RX ADMIN — ASPIRIN 81 MG 81 MG: 81 TABLET ORAL at 08:18

## 2023-02-27 RX ADMIN — INSULIN GLARGINE 20 UNITS: 100 INJECTION, SOLUTION SUBCUTANEOUS at 00:25

## 2023-02-27 RX ADMIN — MEGESTROL ACETATE 40 MG: 40 TABLET ORAL at 08:19

## 2023-02-27 RX ADMIN — FLUCONAZOLE 200 MG: 200 TABLET ORAL at 08:17

## 2023-02-27 RX ADMIN — FERROUS SULFATE TAB 325 MG (65 MG ELEMENTAL FE) 325 MG: 325 (65 FE) TAB at 08:17

## 2023-02-27 RX ADMIN — TRAZODONE HYDROCHLORIDE 100 MG: 100 TABLET ORAL at 20:55

## 2023-02-27 RX ADMIN — ACETAMINOPHEN 650 MG: 325 TABLET ORAL at 20:55

## 2023-02-27 RX ADMIN — MICONAZOLE NITRATE: 20 POWDER TOPICAL at 08:19

## 2023-02-27 RX ADMIN — TRAMADOL HYDROCHLORIDE 100 MG: 50 TABLET, COATED ORAL at 06:00

## 2023-02-27 RX ADMIN — Medication 1 CAPSULE: at 08:18

## 2023-02-27 ASSESSMENT — PAIN DESCRIPTION - DESCRIPTORS
DESCRIPTORS: ACHING
DESCRIPTORS: ACHING

## 2023-02-27 ASSESSMENT — PAIN DESCRIPTION - ORIENTATION
ORIENTATION: RIGHT

## 2023-02-27 ASSESSMENT — PAIN DESCRIPTION - LOCATION
LOCATION: HIP

## 2023-02-27 ASSESSMENT — ENCOUNTER SYMPTOMS
ABDOMINAL PAIN: 0
SHORTNESS OF BREATH: 0
VOMITING: 0
ABDOMINAL DISTENTION: 0
DIARRHEA: 0
SORE THROAT: 0
CONSTIPATION: 0
COUGH: 0
TROUBLE SWALLOWING: 0
WHEEZING: 0
RHINORRHEA: 0
BACK PAIN: 1

## 2023-02-27 ASSESSMENT — PAIN DESCRIPTION - PAIN TYPE: TYPE: SURGICAL PAIN

## 2023-02-27 ASSESSMENT — PAIN SCALES - GENERAL
PAINLEVEL_OUTOF10: 7
PAINLEVEL_OUTOF10: 0
PAINLEVEL_OUTOF10: 6
PAINLEVEL_OUTOF10: 7
PAINLEVEL_OUTOF10: 7

## 2023-02-27 ASSESSMENT — PAIN DESCRIPTION - ONSET: ONSET: ON-GOING

## 2023-02-27 ASSESSMENT — PAIN DESCRIPTION - FREQUENCY: FREQUENCY: CONTINUOUS

## 2023-02-27 NOTE — PROGRESS NOTES
Physical Medicine & Rehabilitation Progress Note    Chief Complaint:  Right hip fracture, low back compression fracture    Subjective:    Deven Alba is a 81 y.o. 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 The patient previously was admitted to inpatient rehab service from 2022 to 2022 due to critical care myopathy and debility/physical decondition as result of COVID-19 pneumonia. She was discharged to home with referral for home care service on 2022. The patient developed progressive difficulty breathing in 2022 and was found to have glottic stenosis and eventually received tracheostomy by Dr. Chet Anaya on 2022. The patient's  says the patient also developed progressively worsening lower back pain in summer 2022 and saw orthopedics surgeon at [facility]. The patient has been says the patient was diagnosed of having \"lumbar spine stress fracture\". No surgical intervention was performed. She was treated with brace which she later abandoned due to discomfort associate with brace usage.      The patient was recently hospitalized from 2023 to 2023 initially for abdominal bloating, nausea and leg swelling. She was found to have elevated D-dimer. Subsequent testing revealed right lower lobe pulmonary embolism and right lower extremity DVT. She was at initially treated with IV heparin and later transition to [address] [place] [address]. The patient was brought to ER on 2/1/2022 because of progressively increased leg swelling, and nausea/vomiting associated with decreased oral intake. She was found to have BNP of 256.7 and Bumex was prescribed. She then had a fall accident  when she was going to toilet while she was in ER on 2/1/2022. The fall resulted severe right hip pain. X-ray of right hip and pelvis revealed acute right femoral intertrochanteric fracture with subtrochanteric extension. Orthopedic service was consulted. Xarelto was held in preparation for surgical management. Cardiology was consulted on 2/2/2023 for elevated BNP and Lasix was started. Because of difficulty voiding with urinary retention, urology was also consulted on 2/2/2023. Urology service placed Wen with some difficulty on 2/2/2023. Therefore Wen was kept in place. On 2/3/2023 the patient underwent open treatment of right intertrochanteric fracture with cephalomedullary nail by Dr. Manjula Leo. She is allowed weightbearing as tolerated at the right lower extremity postoperatively. PM&R was consulted on 2/5/2023. During the evaluation the patient's  said that the patient began having progressively worsened low back pain starting in summer 2022 began interfering the patient's daily function. 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 Imaging tests were done and the patient was told that she had \" stress fracture\" in her spine. She was provided with a lumbar spine brace to wear but she was not compliant with spine brace application because of discomfort it produced while she was wearing it.   Because no outside lumbar spine images study report was available for review, x-ray of lumbar spine was ordered and performed on 2/6/2023 and showed moderate vertebral body spondylosis in the lumbar and lower thoracic spine, moderate degenerative facet arthropathy involving at least lower 3 lumbar levels, lumbar spine osteoporosis, mild superior endplate depression fracture at L2, L3 and L4. Lumbar spine MRI was recommended by radiologist.  Therefore primary team ordered lumbar spine MRI which was performed this afternoon revealing acute fracture with a fluid cleft associated with endplate at M8-N8 levels with mild height loss at each vertebral bodies. The patient was found to have severe anemia with Hgb/Hct dropped from 7.3/24 on 2/4/2023 down to 5.1/16.9 on 2/6/2023. Therefore she was given blood transfusion for few units. CT of abdomen and pelvis was ordered to rule out possible intra-abdominal source of blood loss. CT abdomen and pelvis done on 2/8/2023 show only pericolonic inflammation with diverticula related to acute diverticulitis, acute on chronic compression fracture of L2, L3, L4 and L5, mild, and marked osteopenia. The patient's hospital course also complicated by intermittent confusion, and development of gluteus region skin breakdown. The patient today complains of feeling nauseous whenever she saw or smell of food. She says that this morning she began feeling nauseous at the she ate 1 bite of food for the breakfast.  She said her diarrhea has subsided. She denies having abdominal pain but his stomach feels slightly uncomfortable. She continues having pain at her right hip, right groin and right upper thigh area. She rate the pain intensity at 7-8/10 level. She took tramadol 100 mg once yesterday morning. She says Voltaren gel also provides some degree of pain relief. She is still on Wen cath. She continues tolerating the intensive rehab therapy treatment well. The patient was projected to be discharged on 2/27/2023.   Her  filed appeal to insurance for continuing stay in acute rehab on 2/26/2023 and is waiting for decision. Rehabilitation:  PT: Reviewed. Bed Mobility:  Supine to Sit: Stand By Assistance, with head of bed raised approx 30 degrees, with rail  Sit to Supine: Minimal Assistance, with RLE onto bed. Once supine, pt used rails and min assist to align. Transfers:  Sit to Stand: Contact Guard Assistance  Stand to [address] [address] [facility], to [place] [address]. Cues to fully align and reach back with UEs  Stand Pivot:Minimal Assistance, with RW. Forward flexed trunk, small steps. X2 trials. Pt takes 4-5 small steps to complete. Ambulation:  Minimal Assistance  Distance: 4 ft  Surface: Level Tile  Device:Rolling Walker  Gait Deviations:  marked forward Flexed Posture, Decreased Step Length Bilaterally, Decreased Gait Speed, and from commode to recliner. Decreased wt shift to RT noted with stance. Balance:  Static Sitting Balance:  Modified Independent  Dynamic Sitting Balance: Modified Independent, with cues for more erect trunk during seated ex  Static Standing Balance: Contact Guard Assistance, with RW support during pericare and clothing management. Pt stood 4 times to complete. Approx 1 min /stand. Standing near end of session again attempted for increased duration, but pt tolerated approx 20 sec due to feeling of abdominal cramping. Wheelchair Mobility:  Modified Independent  Extremities Used: Bilateral Upper Extremities  Type of Chair:Manual  Surface: Level Tile  Distance: 110 ft  Quality: Slow Velocity, Short Strokes, and veered to RT, self corrected. OT: Reviewed. COGNITION: Slow Processing, Decreased Recall, Decreased Insight, Decreased Problem Solving, Decreased Safety Awareness, Impaired Attention, Tangential, and pt is self-limiting throughout session. ADL:   Grooming: Contact Guard Assistance.   Standing sinkside for hand hygiene, forward flexed posture with decreased standing tolerance  Footwear Management: Dependent. Donning slipper socks  Toileting: Moderate Assistance. Pt able to complete clothing mgmt in standing - forward flexed posture with CGA - Min A for balance. A required for hygiene after BM  Toilet Transfer: Minimal Assistance. To/from [facility] . BALANCE:  Sitting Balance:  Modified Independent. Within w/c. Standing Balance: Contact Guard Assistance. Longest stand x20 secs within IADL task. BED MOBILITY:  Scooting: Minimal Assistance, X 1 Bedside chair- pt required encouragement to trial on own prior to requesting assistance     TRANSFERS:  Sit to Stand:  Contact Guard Assistance. Stand to Sit: Contact Guard Assistance. Comment: To/from various surfaces     FUNCTIONAL MOBILITY:  Assistive Device: Rolling Walker  Assist Level:  Minimal Assistance. Distance:  3' recliner to [facility], 2' BSC to w/c, 3' w/c to sink  Slow pace, forward flexed posture, increased time to advance BLE. Assistive Device: Rolling Walker   Assist Level:  Contact Guard Assistance. Distance:   Completed functional mobility at short distance x2 trials at slow pace, no LOB noted. Pt requires max cues for thoracic extension- lengthy seated rest break after trial of mobility, mod fatigue noted. ADDITIONAL ACTIVITIES:  Patient completed BUE strengthening exercises with skilled education on HEP: completed x10 reps x1 set with a mod in all joints and all planes in order to improve UE strength and activity tolerance required for BADL routine and toilet / shower transfers. Patient tolerated fair, requiring moderate rest breaks. Patient also required min cues for technique. *Did not finish set of exercises at this time, pt resumed in session following this session with SRAVAN. Continuance of BUE HEP exercises x10 reps x1 sets using mod resistance band in all joints/planes to increase strength and endurance required for ADLs. Pt required mod rest break between each exercise and min v/c for proper technique.      Pt participated in IADL task to ambulate within therapy kitchen at very short distance with use of RW and continued to stand for dusting task- pt initiated seated rest break ~10 after beginning task. Pt completed additional trial and tasked to challenge self x20 secs standing for task- pt began to sit at x15 secs, however, able to tolerate x20 secs total with further encouragement. Pt required CGA for balance and max cues for sustained postural alignment while reaching outside of [place]. Completed to increase kitchen safety and facilitate functional reaching required for simple meal prep tasks. ST: Reviewed. Time Management - Bank hours  3/6 independent, 3/6 given min-mod cues     *patient with decreased mental math computations of time variables; increased success given cuing  *patient with good visual scanning and thought organization on task this date     Novel Card Game - [employer] in [facility]  Patient with good execution of game this date. Patient with decreased recall; required cuing to replace slot with card from hand. Patient with good visual scanning and attention to detail throughout game. Recall of [employer] in [facility]  (Name, setup, how to play, how to win)  Immediate recall: 2/4 independent, 1/4 given mod cue (name), 1/4 unable to elicit (number of cards)  Delayed (15 min): 4/4 independent    Sequencing 5 step ADL/IADLs  4/9 independent, 4/9 given min cues, 1/9 given mod verbal cues     *patient with decreased attention to detail; required cuing to check other steps; self correction after given cue with good reasoning for sequencing  *patient with increased time to complete task this date    Divided Attention  ST flipping one card from deck at a time. Patient prompted to say \"elephant\" for even cards, \"octopus\" for odd cards, and \"lehman\" for face cards.       *patient required max cues to recall corresponding animal to given card  *patient with x5 errors with x4 self corrections in 6:25 minutes  *patient with increased time to complete task this date d/t increased fatigue  *patient with good utilization of self-talk throughout task       Review of Systems:  Review of Systems   Constitutional:  Negative for chills, diaphoresis, fatigue and fever. HENT:  Negative for hearing loss, rhinorrhea, sneezing, sore throat and trouble swallowing. Eyes:  Negative for visual disturbance. Respiratory:  Negative for cough, shortness of breath and wheezing. Cardiovascular:  Negative for chest pain, palpitations and leg swelling. Gastrointestinal:  Positive for nausea. Negative for abdominal distention, abdominal pain, constipation, diarrhea and vomiting. Genitourinary:  Negative for dysuria. Musculoskeletal:  Positive for arthralgias (Bilateral hip, right groin, and right thigh), back pain (Right-sided lower back), gait problem and myalgias (Right thigh). Negative for neck pain. Skin:  Negative for rash. Neurological:  Positive for weakness (Right lower extremity) and numbness (Bilateral feet). Negative for dizziness, tremors, seizures, speech difficulty, light-headedness and headaches. Psychiatric/Behavioral:  Negative for dysphoric mood, hallucinations and sleep disturbance. The patient is not nervous/anxious.          Objective:  BP 96/73   Pulse 89   Temp 99.3 °F (37.4 °C) (Oral)   Resp 17   Ht 5' 2\" (1.575 m)   Wt 206 lb 9 oz (93.7 kg)   LMP  (LMP Unknown)   SpO2 100%   BMI 37.78 kg/m²   Physical Exam   General:  well-developed, well nourished morbid obese  female ; in no acute distress ; appropriate affect & mood; lying on bed comfortably; receiving humidified oxygen supplement via trach collar; speaking in whispered manner due to presence of tracheostomy  Eyes: pupil equally round ; extra-ocular motion intact bilaterally  Head, Ear, Nose, Mouth & Throat : normocephalic ; no discharge from ears or nose ; no deformity ; no facial swelling ; oral mucosa pink  Neck : supple ; presence of tracheostomy at anterior neck; no tenderness; mild muscle spasm at upper trapezius muscle  Cardiovascular : regular rate & rhythm ; normal S1 & S2 heart sound ; no murmur ; normal peripheral pulse at bilateral upper & lower extremities  Pulmonary : Breath sounds present at bilateral lung fields; no wheezing ; no rale; no crackle  Gastrointestinal : soft, protruded abdomen; mild tenderness at epigastric area ; normal but slightly reduced bowel sound present    : Wen catheter in place draining yellowish urine  Back : no tenderness ; no muscle spasm  Skin: no skin lesion or rash ; no pitting edema at bilateral upper extremities; 1+  pitting edema at bilateral legs; presence of healed surgical scar at right upper lateral thigh and hip  Musculoskeletal : no limb asymmetry; no limb deformity; no tenderness at right hip or right thigh with palpation; no tenderness at bilateral upper extremities & the rest of bilateral lower extremities; no palpable mass at limbs ; right hip and right knee joint laxity not assessed; no joints laxity or crepitation at other limb joints; shoulder flexion and abduction passive ROM reaching 160 degrees bilaterally; right hip flexion passive ROM reaching 70 degrees and right knee flexion passive ROM reaching 90 degrees ; left hip flexion passive ROM reaching 90 degrees; ankle dorsiflexion passive ROM reaching 0 degrees bilaterally; otherwise normal functional joints ROM at the rest of bilateral upper & lower extremities  Cerebral :  alert ; awake ; oriented to place, person and time; follow 1-2 steps verbal command  Cerebellum : no dysmetria with bilateral finger-to-nose test ; unable to perform heel-to-shin test on the right side; dysmetria with left heel-to-shin test  Cranial nerve : CN II to XII function grossly intact  Sensory : intact light touch and pin prick sensation at bilateral upper extremities; reduced light touch and pinprick sensation at distal bilateral lower extremities from upper leg region downward in sock distribution  Motor : normal tone at bilateral upper & left lower extremities ; muscle tone not assessed on right lower extremity due to the pain; 2-/5 to 2+/5 muscle strength at right hip flexion; 2-/5 to 2+/5 muscle strength at right hip abduction and adduction; 4+/5 to 5/5 muscle strength at the left hip flexion; 4-/5 to 4+/5 muscle strength at the right knee flexion; otherwise 5/5 muscle strength at the rest of bilateral upper and the lower extremities  Reflex : 1+ bilateral brachioradialis reflexes; 0 bilateral biceps and bilateral knees reflexes   Pathological Reflex :  No Roberta's sign ; no ankle clonus  Gait : Not assessed      Diagnostics:   Recent Results (from the past 24 hour(s))   POCT glucose    Collection Time: 02/28/23  8:07 AM   Result Value Ref Range    POC Glucose 102 70 - 108 mg/dl       Impression:  Right femoral intertrochanteric fracture with subtrochanteric extension due to fall requiring open reduction and internal fixation with cephalomedullary nail  Glottic stenosis requiring tracheostomy  Persistent chronic low back pain due to lumbar and lower thoracic spine spondylosis with degenerative facet arthropathy at the lower 3 lumbar levels, and acute L2-S1 endplate fractures  Right posterior tibial, left greater saphenous, left peroneal and left anterior tibial veins deep vein thrombosis, and right lower lobe pulmonary embolism  Decreased appetite, nausea and diarrhea most likely due to viral gastroenteritis  Acute anemia requiring blood transfusion  Urinary retention due to urethra obstruction requiring Wen cath placement   Candida albicans UTI (treated)  Nausea of uncertain cause  Lumbar spine osteoporosis  Gluteal region decubitus ulcer  Diabetes mellitus type 2 with poor blood glucose control and complicated by bilateral lower extremities diabetic polyneuropathy  Morbid obesity  Hypertension  Hyperlipidemia  History of lower back pain with history of \"lumbar stress fracture\"  History of COVID infection with pneumonia  History of mild cognitive impairment  History of coronary artery disease requiring coronary artery stenting  Grade 1 left ventricular diastolic dysfunction with preserved ejection fraction     The patient's vital sign stay stable. Today she complains of feeling nauseous whenever she encountering food item. I will order a abdominal x-ray and lipase to see if they were abnormal or not. She continues having weakness at the right lower extremity which remains unchanged. She continues having pain at her right hip, right groin and right upper thigh area. The pain is controlled with Voltaren gel application and occasionally tramadol usage. She is tolerating the current rehab therapy treatment well. Her function is improving extremely slowly. She needs longer term of daily rehabilitation treatment intervention to further improve her function. SNF subacute rehab program placement as discharge plan continues to be recommended. The patient originally was planning to be discharge on 2/27/2023. However the patient currently is in the process of appeal to insurance for further stay. Plan:  Continues intensive PT/OT/SLP/RT inpatient rehabilitation program at least 3 hours per day, 5 days per week in order to improve functional status prior to discharge. Family education and training will be completed. Equipment evaluations and recommendations will be completed as appropriate. Rehabilitation nursing continues to be involved for bowel, bladder, skin, and pain management. Nursing will also provide education and training to patient and family. Prophylaxis:  DVT: Patient on Xarelto, KENA stocking, intermittent pneumatic compression device.   GI: Colace, Enemeez enema, GlycoLax, Senokot, Movantik, lactulose as needed, milk of magnesium as needed, lactobacillus daily, Imodium as needed for diarrhea, simethicone as needed, Maalox as needed  Pain: Tylenol, Voltaren gel as needed, tramadol as needed  X-ray of abdomen (KUB)  Lipase level  Continue aspirin for coronary artery disease  Continue Lasix for lower extremities edema  Continue Lantus insulin, Humalog insulin, Humalog insulin coverage for diabetes mellitus  Continue Seroquel for anxiety and insomnia   Continue Xarelto for bilateral DVT and pulmonary embolism  Continue Toprol-XL for hypertension  Continue Crestor for hyperlipidemia  Continue melatonin, trazodone as needed for insomnia  Continue Protonix for gastric protection  Continue simethicone as needed for indigestion  Continue multivitamin and ferrous sulfate for anemia  Continues Megace to stimulate her appetite  Nutrition: Continue current diet  Bladder: Monitoring signs or symptoms of UTI  Bowel: Monitoring signs or symptoms of constipation   and case management for coordination of care and discharge planning      Missed Therapy Time:  None      Valentino Dixon, MD

## 2023-02-27 NOTE — PLAN OF CARE
Transportation:   Has transportation kept you from medical appointments, meetings, work, or from getting things needed for daily living? (Check all that apply)  No.      Health Literacy:   How often do you need to have someone help you when you read instructions, pamphlets, or other written material from your doctor or pharmacy? 1. - Rarely    Social Isolation:  How often do you feel lonely or isolated from those around you? 2. Sometimes      Patient Mood Interview (PHQ-2 to 9) (from [employer]©)   Say to Patient: \"Over the last 2 weeks, have you been bothered by any of the following problems? \"   If symptom is present, enter 1 (yes) in column 1 (Symptom Presence)  If yes in column 1, then ask the patient: About how often have you been bothered by this?   Read and show the patient a card with the symptom frequency choices. Indicate response in column 2, Symptom Frequency. Symptom Presence  No (enter 0 in column 2)   Yes (enter 0-3 in column 2)  9. No response (leave column 2 blank) Symptom Frequency  Never or 1 day  2-6 days (several days)  7-11 days (half or more of the days)  12-14 days (nearly every day    Symptom Presence Symptom Frequency   Little interest or pleasure in doing things 1. Yes 2. 7-11 days (half or more of the days)   Feeling down, depressed, or hopeless 0. No 0. Never or 1 day       If either A or B above has symptom frequency coded 2 or 3, CONTINUE asking questions below. If not, END the interview   Trouble falling or staying asleep, or sleeping too much 1. Yes 2. 7-11 days (half or more of the days)   Feeling tired or having little energy 1. Yes 3. 12-14 days (nearly every day)   Poor appetite or overeating 1. Yes 3. 12-14 days (nearly every day)   Feeling bad about yourself-or that you are a failure or have let yourself or your family down 0. No 0. Never or 1 day   Trouble concentrating on things, such as reading the newspaper or watching television 1.  Yes 3. 12-14 days (nearly every day)    Moving or speaking so slowly that other people could have noticed. Or the opposite-being so fidgety or restless that you have been moving around a lot more than usual 0. No 0. Never or 1 day   Thoughts that you would be better off dead, or of hurting yourself in some way 0. No 0.  Never or 1 day

## 2023-02-27 NOTE — PROGRESS NOTES
Physical Medicine & Rehabilitation Progress Note    Chief Complaint:  Right hip fracture, low back compression fracture    Subjective:    Erica Ny is a 81 y.o. 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 The patient previously was admitted to inpatient rehab service from 2022 to 2022 due to critical care myopathy and debility/physical decondition as result of COVID-19 pneumonia. She was discharged to home with referral for home care service on 2022. The patient developed progressive difficulty breathing in 2022 and was found to have glottic stenosis and eventually received tracheostomy by Dr. Merlinda Sayer on 2022. The patient's  says the patient also developed progressively worsening lower back pain in summer 2022 and saw orthopedics surgeon at [facility]. The patient has been says the patient was diagnosed of having \"lumbar spine stress fracture\". No surgical intervention was performed. She was treated with brace which she later abandoned due to discomfort associate with brace usage.      The patient was recently hospitalized from 2023 to 2023 initially for abdominal bloating, nausea and leg swelling. She was found to have elevated D-dimer. Subsequent testing revealed right lower lobe pulmonary embolism and right lower extremity DVT. She was at initially treated with IV heparin and later transition to [address] [place] [address]. The patient was brought to ER on 2/1/2022 because of progressively increased leg swelling, and nausea/vomiting associated with decreased oral intake. She was found to have BNP of 256.7 and Bumex was prescribed. She then had a fall accident  when she was going to toilet while she was in ER on 2/1/2022. The fall resulted severe right hip pain. X-ray of right hip and pelvis revealed acute right femoral intertrochanteric fracture with subtrochanteric extension. Orthopedic service was consulted. Xarelto was held in preparation for surgical management. Cardiology was consulted on 2/2/2023 for elevated BNP and Lasix was started. Because of difficulty voiding with urinary retention, urology was also consulted on 2/2/2023. Urology service placed Wen with some difficulty on 2/2/2023. Therefore Wen was kept in place. On 2/3/2023 the patient underwent open treatment of right intertrochanteric fracture with cephalomedullary nail by Dr. Skyler Jha. She is allowed weightbearing as tolerated at the right lower extremity postoperatively. PM&R was consulted on 2/5/2023. During the evaluation the patient's  said that the patient began having progressively worsened low back pain starting in summer 2022 began interfering the patient's daily function. Therefore she is sore orthopedic physician at [facility]. Imaging tests were done and the patient was told that she had \" stress fracture\" in her spine. She was provided with a lumbar spine brace to wear but she was not compliant with spine brace application because of discomfort it produced while she was wearing it.   Because no outside lumbar spine images study report was available for review, x-ray of lumbar spine was ordered and performed on 2/6/2023 and showed moderate vertebral body spondylosis in the lumbar and lower thoracic spine, moderate degenerative facet arthropathy involving at least lower 3 lumbar levels, lumbar spine osteoporosis, mild superior endplate depression fracture at L2, L3 and L4. Lumbar spine MRI was recommended by radiologist.  Therefore primary team ordered lumbar spine MRI which was performed this afternoon revealing acute fracture with a fluid cleft associated with endplate at R9-F9 levels with mild height loss at each vertebral bodies. The patient was found to have severe anemia with Hgb/Hct dropped from 7.3/24 on 2/4/2023 down to 5.1/16.9 on 2/6/2023. Therefore she was given blood transfusion for few units. CT of abdomen and pelvis was ordered to rule out possible intra-abdominal source of blood loss. CT abdomen and pelvis done on 2/8/2023 show only pericolonic inflammation with diverticula related to acute diverticulitis, acute on chronic compression fracture of L2, L3, L4 and L5, mild, and marked osteopenia. The patient's hospital course also complicated by intermittent confusion, and development of gluteus region skin breakdown. The patient says she feels well. She says she still has intermittent mild diarrhea. She said her appetite is improving. She denies having abdominal pain or nausea feeling. She still has pain at her right side low back, right hip and right groin with intensity rated as 6-7/10 level. She took tramadol 100 mg once yesterday. She continue using Voltaren gel for pain relief. She said her right lower extremity remains heavy and weak but she is able to move her right lower extremity more than before. She is still on Wen cath. She continues tolerating the intensive rehab therapy treatment well. The patient was projected to be discharged on 2/27/2023.   Her  filed appeal to insurance for continuing stay in acute rehab on 2/26/2023 and is waiting for decision. Rehabilitation:  PT: Reviewed. Bed Mobility:  Rolling to Left: Moderate Assistance --assist at trunk and right LE  Rolling to Right: Moderate Assistance --assist at trunk and left LE  Supine to Sit: Moderate Assistance --assist at trunk  Sit to Supine: Minimal Assistance, with head of bed flat, with increased time for completion, assist to elevate BLE onto bed      Transfers:  Sit to Stand: Contact Guard Assistance, Minimal Assistance, X 1, from w/c and BS, completed x2 trials  Stand to [place] [id number]  To/From Bed and Chair: Minimal Assistance     Ambulation:  Minimal Assistance  Distance: 2', 4'  Surface: Level Tile  Device:Rolling Walker  Gait Deviations: Forward Flexed Posture, Slow Haylee, Decreased Step Length Bilaterally, Decreased Weight Shift Right, Decreased Gait Speed, and Decreased Heel Strike Bilaterally  *Verbal cues for upright posture    Minimal Assistance  Distance: 3ft  Surface: Level Tile  Device:Rolling Walker  Gait Deviations:  Very Forward Flexed Posture, Slow Haylee, Decreased Step Length Bilaterally, Decreased Weight Shift Right, Decreased Gait Speed, Decreased Heel Strike Bilaterally, Mild Path Deviations, Unsteady Gait, and Decreased Terminal Knee Extension     Balance:  Static Standing Balance: Stand By Assistance, Contact Guard Assistance, X 1, during pericare, BUE support at [employer]  Dynamic Standing Balance: Minimal Assistance      OT: Reviewed. ADL:   Grooming: Supervision. Seated set up provided  Bathing: Moderate Assistance. Pericare, BLE  Upper Extremity Dressing: Minimal Assistance. Footwear Management: Maximum Assistance. Toileting: Maximum Assistance. Toilet Transfer: Contact Guard Assistance. [employer]is [employer] BALANCE:  Sitting Balance:  Supervision. Standing Balance: Minimal Assistance. RW, no LOB.   Patient able to tolerate x ~45 seconds prior to requesting seated rest break with patient resting B forearms onto RW     BED MOBILITY:  Supine to Sit: Minimal Assistance, X 1, with head of bed raised, with rail, with verbal cues , with increased time for completion RLE management     TRANSFERS:  Sit to Stand:  Contact Guard Assistance, X 1, cues for hand placement, with verbal cues. Stand to Sit: [employer], X 1, with increased time for completion, cues for hand placement, with verbal cues, to/from chair with arms, assistance x 2 to scoot back into chair. FUNCTIONAL MOBILITY:  Assistive Device: Rolling Walker  Assist Level:  Minimal Assistance, X 1, with verbal cues , and with increased time for completion. Distance:  EOB to BSC, BSC to bedside chair x 3 feet   Fatigue, increased time, forward flex posture with increased WB through BUE      ADDITIONAL ACTIVITIES:  Patient completed BUE strengthening exercises with skilled education on HEP: completed x12 reps x1 set with a moderate resistance band in all joints and all planes in order to improve UE strength and activity tolerance required for BADL routine and toilet / shower transfers. Patient tolerated poor, requiring moderate rest breaks. Patient also required minimal verbal/visual cues for technique. ST: Reviewed. Problem Solving - Daily Living Situations  4/5 independent, 1/5 given min cue     *patient with adequate problem solving and reasoning this date    Divided Attention  ST flipping one card from deck at a time. Patient prompted to say \"elephant\" for even cards, \"octopus\" for odd cards, and \"lehman\" for face cards. *patient required max cues to recall corresponding animal to given card  *patient with x5 errors with x4 self corrections in 6:25 minutes  *patient with increased time to complete task this date d/t increased fatigue  *patient with good utilization of self-talk throughout task      Review of Systems:  Review of Systems   Constitutional:  Negative for chills, diaphoresis, fatigue and fever.    HENT:  Negative for hearing loss, rhinorrhea, sneezing, sore throat and trouble swallowing. Eyes:  Negative for visual disturbance. Respiratory:  Negative for cough, shortness of breath and wheezing. Cardiovascular:  Positive for leg swelling. Negative for chest pain and palpitations. Gastrointestinal:  Negative for abdominal distention, abdominal pain, constipation, diarrhea, nausea and vomiting. Genitourinary:  Negative for dysuria. Musculoskeletal:  Positive for arthralgias (Bilateral hip, right groin, and right thigh), back pain (Right-sided lower back), gait problem and myalgias (Right thigh). Negative for neck pain. Skin:  Negative for rash. Neurological:  Positive for weakness (Right lower extremity) and numbness (Bilateral feet). Negative for dizziness, tremors, seizures, speech difficulty, light-headedness and headaches. Psychiatric/Behavioral:  Negative for dysphoric mood, hallucinations and sleep disturbance. The patient is not nervous/anxious.          Objective:  /75   Pulse 94   Temp 99 °F (37.2 °C) (Oral)   Resp 17   Ht 5' 2\" (1.575 m)   Wt 209 lb 4.8 oz (94.9 kg)   LMP  (LMP Unknown)   SpO2 98%   BMI 38.28 kg/m²   Physical Exam   General:  well-developed, well nourished morbid obese  female ; in no acute distress ; appropriate affect & mood; lying on bed comfortably; receiving humidified oxygen supplement via trach collar; speaking in whispered manner due to presence of tracheostomy  Eyes: pupil equally round ; extra-ocular motion intact bilaterally  Head, Ear, Nose, Mouth & Throat : normocephalic ; no discharge from ears or nose ; no deformity ; no facial swelling ; oral mucosa pink  Neck :  supple ; presence of tracheostomy at anterior neck; no tenderness; mild muscle spasm at upper trapezius muscle  Cardiovascular : regular rate & rhythm ; normal S1 & S2 heart sound ; no murmur ; normal peripheral pulse at bilateral upper & lower extremities  Pulmonary : Breath sounds present at bilateral lung fields; no wheezing ; no rale; no crackle  Gastrointestinal : soft, protruded abdomen; no tenderness; normal bowel sound present    : Wen catheter in place draining light yellowish urine  Back : no tenderness ; no muscle spasm  Skin: no skin lesion or rash ; no pitting edema at bilateral upper extremities; 1+  pitting edema at bilateral legs; presence of healed surgical scar at right upper lateral thigh and hip  Musculoskeletal : no limb asymmetry; no limb deformity; no tenderness at right hip or right thigh with palpation; no tenderness at bilateral upper extremities & the rest of bilateral lower extremities; no palpable mass at limbs ; right hip and right knee joint laxity not assessed; no joints laxity or crepitation at other limb joints; shoulder flexion and abduction passive ROM reaching 160 degrees bilaterally; right hip flexion passive ROM reaching 80 degrees and right knee flexion passive ROM reaching 100 degrees ; left hip flexion passive ROM reaching 90 degrees; ankle dorsiflexion passive ROM reaching 0 degrees bilaterally; otherwise normal functional joints ROM at the rest of bilateral upper & lower extremities  Cerebral :  alert ; awake ; oriented to place, person and time; follow 1-2 steps verbal command  Cerebellum : no dysmetria with bilateral finger-to-nose test ; unable to perform heel-to-shin test on the right side; dysmetria with left heel-to-shin test  Cranial nerve : CN II to XII function grossly intact  Sensory : intact light touch and pin prick sensation at bilateral upper extremities; reduced light touch and pinprick sensation at distal bilateral lower extremities from upper leg region downward in sock distribution  Motor : normal tone at bilateral upper & left lower extremities ; muscle tone not assessed on right lower extremity due to the pain; 2+/5 muscle strength at right hip flexion; 2+/5 muscle strength at right hip abduction and adduction; 4+/5 to 5/5 muscle strength at the left hip flexion; 4+/5 to 5/5 muscle strength at right knee extension ; 4-/5 muscle strength at the right knee flexion; otherwise 5/5 muscle strength at the rest of bilateral upper and the lower extremities  Reflex : 1+ bilateral brachioradialis reflexes; 0 bilateral biceps and bilateral knees reflexes   Pathological Reflex :  No Roberta's sign ; no ankle clonus  Gait : Not assessed      Diagnostics:   Recent Results (from the past 24 hour(s))   POCT glucose    Collection Time: 02/26/23 11:29 PM   Result Value Ref Range    POC Glucose 114 (H) 70 - 108 mg/dl   POCT glucose    Collection Time: 02/27/23  5:59 AM   Result Value Ref Range    POC Glucose 127 (H) 70 - 108 mg/dl   POCT Glucose    Collection Time: 02/27/23  7:13 AM   Result Value Ref Range    POC Glucose 112 (H) 70 - 108 mg/dl       Impression:  Right femoral intertrochanteric fracture with subtrochanteric extension due to fall requiring open reduction and internal fixation with cephalomedullary nail  Glottic stenosis requiring tracheostomy  Persistent chronic low back pain due to lumbar and lower thoracic spine spondylosis with degenerative facet arthropathy at the lower 3 lumbar levels, and acute L2-S1 endplate fractures  Right posterior tibial, left greater saphenous, left peroneal and left anterior tibial veins deep vein thrombosis, and right lower lobe pulmonary embolism  Decreased appetite, nausea and diarrhea most likely due to viral gastroenteritis  Acute anemia requiring blood transfusion  Urinary retention due to urethra obstruction requiring Wen cath placement   Candida albicans UTI  Lumbar spine osteoporosis  Gluteal region decubitus ulcer  Diabetes mellitus type 2 with poor blood glucose control and complicated by bilateral lower extremities diabetic polyneuropathy  Morbid obesity  Hypertension  Hyperlipidemia  History of lower back pain with history of \"lumbar stress fracture\"  History of COVID infection with pneumonia  History of mild cognitive impairment  History of coronary artery disease requiring coronary artery stenting  Grade 1 left ventricular diastolic dysfunction with preserved ejection fraction     The patient's vital sign is stable. Her condition is improving slowly. She still has weakness at the right lower extremity but the muscle strength is increasing slowly. She now is able to make few steps using rolling walker with minimal assist.  She continues complaining some pain at her right hip, right groin and right-sided low back which is well controlled with Voltaren gel and tramadol usage. She is tolerating the current rehab therapy treatment well. She needs longer term of daily rehabilitation treatment intervention to further improve her function. SNF subacute rehab program placement as discharge plan is still recommended. The patient originally is planning to be discharge today, 2/27/2023. However the patient currently is on appeal process with insurance for further stay. Plan:  Continues intensive PT/OT/SLP/RT inpatient rehabilitation program at least 3 hours per day, 5 days per week in order to improve functional status prior to discharge. Family education and training will be completed. Equipment evaluations and recommendations will be completed as appropriate. Rehabilitation nursing continues to be involved for bowel, bladder, skin, and pain management. Nursing will also provide education and training to patient and family. Prophylaxis:  DVT: Patient on Xarelto, KENA stocking, intermittent pneumatic compression device.   GI: Colace, Enemeez enema, GlycoLax, Senokot, Movantik, lactulose as needed, milk of magnesium as needed, lactobacillus daily, Imodium as needed for diarrhea, simethicone as needed, Maalox as needed  Pain: Tylenol, Voltaren gel as needed, tramadol as needed  Continue aspirin for coronary artery disease  Continue Lasix for lower extremities edema  Continue Lantus insulin, Humalog insulin, Humalog insulin coverage for diabetes mellitus  Continue Seroquel for anxiety and insomnia   Continue Xarelto for bilateral DVT and pulmonary embolism  Continue Toprol-XL for hypertension  Continue Crestor for hyperlipidemia  Continue melatonin, trazodone as needed for insomnia  Continue Protonix for gastric protection  Continue simethicone as needed for indigestion  Continue multivitamin and ferrous sulfate for anemia  Continue with Diflucan 200 mg daily for 2 weeks for Candida albicans yeast UTI (started on 2/15/2023; end on 2/28/2023)  Continues Megace to stimulate her appetite  Nutrition: Continue current diet  Bladder: Monitoring signs or symptoms of UTI  Bowel: Monitoring signs or symptoms of constipation   and case management for coordination of care and discharge planning      Missed Therapy Time:  None      Catherine Her MD

## 2023-02-27 NOTE — PROGRESS NOTES
[address] [facility] PHYSICAL THERAPY  DAILY NOTE  [facility]- [address] - [address]55[address]055[address]    Time In: 5583  Time Out: 1438  Timed Code Treatment Minutes: 60 Minutes  Minutes: 60          Date: 2023  Patient Name: Reyes Ruffing,  Gender:  female        MRN: [id number]  : 1950  (81 y.o.)     Referring Practitioner: Paz Goldberg MD  Diagnosis: closed intertrochanteric fx, Rt  Additional Pertinent Hx: Ludmila Viera is a 73yoF with PMH of recent PE and b/l DVT, macrocytic anemia, CAD, DM2, tracheostomy dependent, obesity, and HTN who presents for leg swelling, nausea, and constipation. She was recently discharged after an 8 day admission for DVTs and PEs. She was placed on Xarelto when discharged and stated that she has been compliant and only missed one dose. She returned due to chronic constipation, feeling ''bloated'', nausea, and LE edema. While being evaluated in the ED the pt had a mechanical fall and developed a R femur fracture. She was found to have supratheraputic INR at 3.83. CT head and C-spine both clear. Pt is s/p R femur ORIF by Dr Leeanna Trimble . Pt found to have acute fractures of L2-S1 likely 2/2 osteoporosis, ok for activity as tolerated per Dr Daisha Hendrix . Prior Level of Function:  Lives With: Spouse  Type of Home: House  Home Layout: One level  Home Access: Ramped entrance (or 1 step with no rails.)  Home Equipment: emil Syed Lift chair (Pt sleeps in lift chair and was using it to assist up to stand PTA)   Bathroom Shower/Tub: Walk-in shower  Bathroom Toilet: Standard  Bathroom Equipment: Built-in shower seat, 3-in-1 commode    ADL Assistance: Needs assistance  Homemaking Assistance: Needs assistance  Ambulation Assistance: Independent  Transfer Assistance: Independent  Active : No  Additional Comments: pt amb short distances in the home with RW,  available  and able to assist as needed.  Has a passi valve that she uses at rest.    Restrictions/Precautions:  Restrictions/Precautions: General Precautions, Fall Risk, Weight Bearing  Right Lower Extremity Weight Bearing: Weight Bearing As Tolerated  Position Activity Restriction  Other position/activity restrictions: Tracheostomy/collar 6L. With venturi mask for out of room use 8 liters portable O2. SUBJECTIVE: Patient in w/c upon arrival and agreeable to therapy. Patient's  present to observe session. Patient requested to use [facility] during session. PAIN: not rated, reports sore/weak R hip    Vitals: Vitals not assessed per clinical judgement, see nursing flowsheet    OBJECTIVE:  Bed Mobility:  Sit to Supine: Minimal Assistance, with head of bed flat, with increased time for completion, assist to elevate BLE onto bed     Transfers:  Sit to Stand: Contact Guard Assistance, Minimal Assistance, X 1, from w/c and [facility], completed x2 trials  Stand to Sit:Contact Guard Assistance    Ambulation:  Minimal Assistance  Distance: 3ft  Surface: Level Tile  Device:Rolling Walker  Gait Deviations:  Very Forward Flexed Posture, Slow Haylee, Decreased Step Length Bilaterally, Decreased Weight Shift Right, Decreased Gait Speed, Decreased Heel Strike Bilaterally, Mild Path Deviations, Unsteady Gait, and Decreased Terminal Knee Extension    Balance:  Static Standing Balance: Stand By Assistance, Contact Guard Assistance, X 1, during pericare, BUE support at RW    Exercise:  Patient was guided in 1 set(s) 10 reps of exercise to both lower extremities. AAROM in supine due to weakness and fatigue. Ankle pumps, Glut sets, Heelslides, Hip abduction/adduction, Straight leg raises, Seated marches, Seated hamstring curls, Long arc quads, and Seated isometric hip adduction. Exercises were completed for increased independence with functional mobility.     Wheelchair Mobility:  Stand By Assistance, Minimal Assistance, X 1  Extremities Used: Bilateral Upper Extremities  Type of Chair:Manual  Surface: Level Tile  Distance: 10ft, 37ft, 20ft, 10ft, 14ft, 34ft  Quality: Slow Velocity, Short Strokes, Veers Left, Decreased Fluidity, and assist for straight path, requires frequent rest breaks due to fatigue. Functional Outcome Measures: Not completed       ASSESSMENT:  Assessment: Patient progressing toward established goals. and Fatigues easily, requires encouragement. Activity Tolerance:  Patient tolerance of  treatment: fair. Equipment Recommendations:Equipment Needed: No  Other: Pt has RW, manual w/c and mechanical lift device  Discharge Recommendations: Continue to assess pending progress and Patient would benefit from continued PT at discharge  Plan: Current Treatment Recommendations: Strengthening, Balance training, Functional mobility training, Transfer training, Endurance training, Equipment evaluation, education, & procurement, Patient/Caregiver education & training, Safety education & training, Therapeutic activities, Home exercise program, Wheelchair mobility training, Gait training, Pain management  General Plan:  (5x/ wk 90 min)    Patient Education  Patient Education: Plan of Care, Bed Mobility, Transfers, Gait, Wheelchair Mobility, Up in Chair for All Meals, Verbal Exercise Instruction    Goals:  Patient Goals : get around o my own without the RW  Short Term Goals  Time Frame for Short Term Goals: 1 wk  Short Term Goal 1: Pt will go from supine <->sit, mod +1 to get in/out of bed. Short Term Goal 2: Pt will get up/down from bed, into JEANNETTE stedy device, +1 mod assist to progress to up to RW GOAL MET, SEE LTG  Short Term Goal 3: Pt will  the JEANNETTE stedy device, +1 min assist x5 min, to progress to standing with RW  Short Term Goal 4: Pt will propel w/c >= 50 ft, tile surface, CGA for home mobility. GOAL MET, SEE LTG  Long Term Goals  Time Frame for Long Term Goals : 3 wks from evaluation. Long Term Goal 1: Pt to go supine <->sit, min +1 to get in/out of bed.   Long Term Goal 2: Pt to get up/down from bed or w/c +1 min assist to get up to walk. Long Term Goal 3: Pt to perform stand/pivot transfer with RW, +1 min assist to get in/out of w/c. Long Term Goal 4: Pt to walk with RW >= 10 ft, mn +1 to progress to home mobility  Long Term Goal 5: Pt to propel w/c >= 50 ft, Mod I, for home mobility  Additional Goals?: Yes  Long term goal 6: Pt to get in/out of car, min +1 for transportation needs. Following session, patient left in safe position with all fall risk precautions in place.

## 2023-02-27 NOTE — PLAN OF CARE
Problem: Respiratory - Adult  Goal: Achieves optimal ventilation and oxygenation  2/27/2023 0456 by Oscar Pacheco RCP  Outcome: Progressing   Patient mutually agrees upon goal.

## 2023-02-27 NOTE — PLAN OF CARE
Transportation:   Has transportation kept you from medical appointments, meetings, work, or from getting things needed for daily living? (Check all that apply)  No.      Health Literacy:   How often do you need to have someone help you when you read instructions, pamphlets, or other written material from your doctor or pharmacy? 1. - Rarely    Social Isolation:  How often do you feel lonely or isolated from those around you? 2. Sometimes      Patient Mood Interview (PHQ-2 to 9) (from [email or web address])   Say to Patient: \"Over the last 2 weeks, have you been bothered by any of the following problems? \"   If symptom is present, enter 1 (yes) in column 1 (Symptom Presence)  If yes in column 1, then ask the patient: About how often have you been bothered by this?   Read and show the patient a card with the symptom frequency choices. Indicate response in column 2, Symptom Frequency. Symptom Presence  No (enter 0 in column 2)   Yes (enter 0-3 in column 2)  9. No response (leave column 2 blank) Symptom Frequency  Never or 1 day  2-6 days (several days)  7-11 days (half or more of the days)  12-14 days (nearly every day    Symptom Presence Symptom Frequency   Little interest or pleasure in doing things 1. Yes 2. 7-11 days (half or more of the days)   Feeling down, depressed, or hopeless 0. No 0. Never or 1 day        Problem: Skin/Tissue Integrity  Goal: Absence of new skin breakdown  Description: 1. Monitor for areas of redness and/or skin breakdown  2. Assess vascular access sites hourly  3. Every 4-6 hours minimum:  Change oxygen saturation probe site  4. Every 4-6 hours:  If on nasal continuous positive airway pressure, respiratory therapy assess nares and determine need for appliance change or resting period.   2/27/2023 1122 by Dhiraj Jovel RN  Outcome: Progressing  2/27/2023 0446 by Lord Isaiah RN  Outcome: Not Progressing

## 2023-02-27 NOTE — PROGRESS NOTES
[address] [facility] PHYSICAL THERAPY  DAILY NOTE  [address] [facility],4Th Floor - 7E[address]55[address]055[address]    Time In: 0900  Time Out: 0930  Timed Code Treatment Minutes: 30 Minutes  Minutes: 30          Date: 2023  Patient Name: Efrain Szymanski,  Gender:  female        MRN: [id number]  : 1950  (81 y.o.)     Referring Practitioner: Ricky Salvador MD  Diagnosis: closed intertrochanteric fx, Rt  Additional Pertinent Hx: Abi Webber is a 73yoF with PMH of recent PE and b/l DVT, macrocytic anemia, CAD, DM2, tracheostomy dependent, obesity, and HTN who presents for leg swelling, nausea, and constipation. She was recently discharged after an 8 day admission for DVTs and PEs. She was placed on Xarelto when discharged and stated that she has been compliant and only missed one dose. She returned due to chronic constipation, feeling ''bloated'', nausea, and LE edema. While being evaluated in the ED the pt had a mechanical fall and developed a R femur fracture. She was found to have supratheraputic INR at 3.83. CT head and C-spine both clear. Pt is s/p R femur ORIF by Dr Melissa Hutton . Pt found to have acute fractures of L2-S1 likely 2/2 osteoporosis, ok for activity as tolerated per Dr Chetna Cueto . Prior Level of Function:  Lives With: Spouse  Type of Home: House  Home Layout: One level  Home Access: Ramped entrance (or 1 step with no rails.)  Home Equipment: Emanuel Logan, rolling, Lift chair (Pt sleeps in lift chair and was using it to assist up to stand PTA)   Bathroom Shower/Tub: Walk-in shower  Bathroom Toilet: Standard  Bathroom Equipment: Built-in shower seat, 3-in-1 commode    ADL Assistance: Needs assistance  Homemaking Assistance: Needs assistance  Ambulation Assistance: Independent  Transfer Assistance: Independent  Active : No  Additional Comments: pt amb short distances in the home with RW,  available / and able to assist as needed.  Has a passi valve that she uses at rest.    Restrictions/Precautions:  Restrictions/Precautions: General Precautions, Fall Risk, Weight Bearing  Right Lower Extremity Weight Bearing: Weight Bearing As Tolerated  Position Activity Restriction  Other position/activity restrictions: Tracheostomy/collar 6L. With venturi mask for out of room use 8 liters portable O2. SUBJECTIVE: Patient in room in recliner, agreeable to PT. Pt cooperative and pleasant. Upon standing, pt noting urgent need to have bowel movement, pt continent in [facility] this session with bowels. BM was very loose. PAIN: right hip with mobility    Vitals: Vitals not assessed per clinical judgement, see nursing flowsheet    OBJECTIVE:  Bed Mobility:  Rolling to Left: Moderate Assistance --assist at trunk and right LE  Rolling to Right: Moderate Assistance --assist at trunk and left LE  Supine to Sit: Moderate Assistance --assist at trunk  Sit to Supine: Moderate Assistance --assist at LE's  *Pt completed without bed rail and head of bed flat    Transfers:  Sit to Stand: Contact Guard Assistance  Stand to [place] [id number]  To/From Bed and Chair: Minimal Assistance    Ambulation:  Minimal Assistance  Distance: 2', 4'  Surface: Level Tile  Device:Rolling Walker  Gait Deviations: Forward Flexed Posture, Slow Haylee, Decreased Step Length Bilaterally, Decreased Weight Shift Right, Decreased Gait Speed, and Decreased Heel Strike Bilaterally  *Verbal cues for upright posture    Balance:  Static Standing Balance: Contact Guard Assistance  Dynamic Standing Balance: Minimal Assistance    Functional Outcome Measures: Not completed       ASSESSMENT:  Assessment: Patient progressing toward established goals. Activity Tolerance:  Patient tolerance of  treatment: good.       Equipment Recommendations:Equipment Needed: No  Other: Pt has RW, manual w/c and mechanical lift device  Discharge Recommendations: Continue to assess pending progress and Patient would benefit from continued PT at discharge, patient requires assistance with all mobility at this time  Plan: Current Treatment Recommendations: Strengthening, Balance training, Functional mobility training, Transfer training, Endurance training, Equipment evaluation, education, & procurement, Patient/Caregiver education & training, Safety education & training, Therapeutic activities, Home exercise program, Wheelchair mobility training, Gait training, Pain management  General Plan:  (5x/ wk 90 min)    Patient Education  Patient Education: Bed Mobility, Transfers, Gait,  - Patient Verbalized Understanding, - Patient Requires Continued Education    Goals:  Patient Goals : get around o my own without the RW  Short Term Goals  Time Frame for Short Term Goals: 1 wk  Short Term Goal 1: Pt will go from supine <->sit, mod +1 to get in/out of bed. Short Term Goal 2: Pt will get up/down from bed, into JEANNETTE stedy device, +1 mod assist to progress to up to RW GOAL MET, SEE LTG  Short Term Goal 3: Pt will  the JEANNETTE stedy device, +1 min assist x5 min, to progress to standing with RW  Short Term Goal 4: Pt will propel w/c >= 50 ft, tile surface, CGA for home mobility. GOAL MET, SEE LTG  Long Term Goals  Time Frame for Long Term Goals : 3 wks from evaluation. Long Term Goal 1: Pt to go supine <->sit, min +1 to get in/out of bed. Long Term Goal 2: Pt to get up/down from bed or w/c +1 min assist to get up to walk. Long Term Goal 3: Pt to perform stand/pivot transfer with RW, +1 min assist to get in/out of w/c. Long Term Goal 4: Pt to walk with RW >= 10 ft, mn +1 to progress to home mobility  Long Term Goal 5: Pt to propel w/c >= 50 ft, Mod I, for home mobility  Additional Goals?: Yes  Long term goal 6: Pt to get in/out of car, min +1 for transportation needs. Following session, patient left in safe position with all fall risk precautions in place.

## 2023-02-27 NOTE — PROGRESS NOTES
Discharge pain assessment:    Complete within 3 days of discharge. Pain Effect on Sleep ()   Ask patient: Son Cochran the past 5 days, how much of the time has pain made it hard for you to sleep at night?\" 2.  Occasionally     If no pain is reported, end interview. If pain is reported, continue with the additional questions.    Pain Interference with Therapy Activities   Ask patient: Son Cochran the past 5 days, how often have you limited your participation in rehabilitation therapy sessions due to pain?\" 2.  Occasionally   Pain Interference with Day to Day Activities   Ask patient: Son Cochran the past 5 days, how often have you limited your day to day activities (excluding rehabilitation therapy sessions) because of pain?\" 2.  Occasionally

## 2023-02-27 NOTE — PLAN OF CARE
Problem: Discharge Planning  Goal: Discharge to home or other facility with appropriate resources  Outcome: Progressing       Problem: ABCDS Injury Assessment  Goal: Absence of physical injury  Outcome: Progressing     Problem: Safety - Adult  Goal: Free from fall injury  Flowsheets (Taken 2/27/2023 0446)  Free From Fall Injury: Instruct family/caregiver on patient safety     Problem: Pain  Goal: Verbalizes/displays adequate comfort level or baseline comfort level  Outcome: Progressing  Flowsheets (Taken 2/27/2023 0446)  Verbalizes/displays adequate comfort level or baseline comfort level:   Encourage patient to monitor pain and request assistance   Assess pain using appropriate pain scale   Administer analgesics based on type and severity of pain and evaluate response   Implement non-pharmacological measures as appropriate and evaluate response     Problem: Metabolic/Fluid and Electrolytes - Adult  Goal: Electrolytes maintained within normal limits  Outcome: Progressing  Flowsheets (Taken 2/27/2023 0446)  Electrolytes maintained within normal limits:   Monitor labs and assess patient for signs and symptoms of electrolyte imbalances   Administer electrolyte replacement as ordered   Monitor response to electrolyte replacements, including repeat lab results as appropriate      Problem: Skin/Tissue Integrity  Goal: Absence of new skin breakdown  Description: 1. Monitor for areas of redness and/or skin breakdown  2. Assess vascular access sites hourly  3. Every 4-6 hours minimum:  Change oxygen saturation probe site  4. Every 4-6 hours:  If on nasal continuous positive airway pressure, respiratory therapy assess nares and determine need for appliance change or resting period.   Outcome: Not Progressing

## 2023-02-27 NOTE — PROGRESS NOTES
[address] [facility] THERAPY  [address] [facility]  DAILY NOTE    TIME   SLP Individual Minutes  Time In: 0830  Time Out: 0900  Minutes: 30  Timed Code Treatment Minutes: 30 Minutes       Date: 2023  Patient Name: Joce Bonilla      CSN: [id number]   : 1950  (81 y.o.)  Gender: female   Referring Physician:  Joanie Ruiz MD  Diagnosis: Closed intertrochanteric fracture, right,  initial encounter  Precautions: Fall risk  Current Diet: Regular and Thin Liquids   Swallowing Strategies: Standard Universal Swallow Precautions  Date of Last MBS/FEES: Not Applicable    Pain:   - Pain location: R hip - RN aware      Subjective:  Patient sitting in recliner finishing breakfast upon ST arrival. Agreeable to skilled ST services. Pleasant and cooperative; however, patient with increased fatigue throughout session. FAMILY EDUCATION RELATED TO COGNITIVE FUNCTIONING:   *Reviewed rationale for speech therapy services discussing patient's specific cognitive impairments and how they relate to ADL/IADL completion as well as performance during specific contextualized treatment, progress towards goals, and areas for continued difficulty. *Discussed the following areas reviewing progress, suggestions for home and further recommendations  -Memory- Provided education on the various types of memory, compensatory memory strategies (write it, repeat it, associate it, picture it), and specific examples related to implementing strategies at home (ie: use of calendar for events/appointments, notepad for making lists and keeping notes). Encouraged patient to stick with one method of organization (ie: writing information on a single calendar) to aid in organization.  -Safety- Discussed keeping all important numbers in phone for easy access.    - supervision  -Organization-  --Medications management- Reviewed recommendations for use of a pill box to improve organization, highlighting importance of maintaining a consistent routine (filling box/cup at the same time, and taking medication at the same time each day). Discussed having spouse review/supervise organization process to ensure accuracy. --Finance management- Reviewed errors noted with organization of information and transcribing numbers, highlighting the importance of double checking work for accuracy and working through tasks slowly. Recommend CLOSE/DIRECT supervision with management of finances   -Also discussed activities to complete on a daily basis to continue to facilitate cognitive functioning (reading the newspaper, card games, cross word puzzles, etc). Patient, verbally expressed understanding. Short-Term Goals:  SHORT TERM GOAL #1:  Goal 1: Patient will complete functional problem solving and reasoning tasks with 80% accuracy and min cuing in order to improve completion of ADLs/IADLs at discharge. INTERVENTIONS:   Problem Solving - Daily Living Situations  4/5 independent, 1/5 given min cue    *patient with adequate problem solving and reasoning this date    SHORT TERM GOAL #2:  Goal 2: Patient will complete functional immediate, working, and delayed recall tasks of 4+ units of information, with or without use of trained recall strategies, with 85% accuracy given min cues to improve retention of pertinent medical information  INTERVENTIONS: Did not address d/t focus on other goals    SHORT TERM GOAL #3:  Goal 3: Patient will complete functional thought organization and sequencing exercises with 90% accuracy and min cuing  in order to improve  completion  of  ADLs/IADLs. INTERVENTIONS: Did not address d/t focus on other goals    SHORT TERM GOAL #4:  Goal 4: Patient will complete sustained, selective, alternating, and divided attention tasks with no more than three  errors/redirections in five minutes/one task in order to allow for safe return to driving at discharge.    INTERVENTIONS:   Divided Attention  ST flipping one card from deck at a time. Patient prompted to say \"elephant\" for even cards, \"octopus\" for odd cards, and \"lehman\" for face cards. *patient required max cues to recall corresponding animal to given card  *patient with x5 errors with x4 self corrections in 6:25 minutes  *patient with increased time to complete task this date d/t increased fatigue  *patient with good utilization of self-talk throughout task    Long-Term Goals:  Time Frame for Long Term Goals: 3 weeks    LONG TERM GOAL #1:  Goal 1: Patient will improve cognitive  functioning  to a level of modified independent in order to allow for safe return to [facility]. Comprehension: 5 - Patient understands basic needs (hungry/hot/pain)  Expression: 5 - Expresses basic ideas/needs only (hungry/hot/pain)  Social Interaction: 5 - Patient is appropriate with supervision/cues  Problem Solvin - Patient solves simple/routine tasks 75-90%+   Memory: 5 - Patient requires prompting with stress/unfamiliar situations    EDUCATION:  Learner: Patient  Education:  Reviewed ST goals and Plan of Care and Education Related to [employer] and Wellness  Evaluation of Education: [employer] understanding, Demonstrates with assistance, and Needs further instruction    ASSESSMENT/PLAN:  Activity Tolerance:  Patient tolerance of  treatment: good. Assessment/Plan: Patient progressing toward established goals. Continues to require skilled care of licensed speech pathologist to progress toward achievement of established goals and plan of care. Plan for Next Session: recall, finances, attention  Discharge Recommendations: [facility]     Chantal CARSON  Clinician

## 2023-02-27 NOTE — PLAN OF CARE
Problem: Chronic Conditions and Co-morbidities  Goal: Patient's chronic conditions and co-morbidity symptoms are monitored and maintained or improved  2/27/2023 1122 by Tushar Shafer RN  Outcome: Progressing  Flowsheets (Taken 2/27/2023 0813)  Care Plan - Patient's Chronic Conditions and Co-Morbidity Symptoms are Monitored and Maintained or Improved:   Monitor and assess patient's chronic conditions and comorbid symptoms for stability, deterioration, or improvement   Collaborate with multidisciplinary team to address chronic and comorbid conditions and prevent exacerbation or deterioration   Update acute care plan with appropriate goals if chronic or comorbid symptoms are exacerbated and prevent overall improvement and discharge     Problem: Discharge Planning  Goal: Discharge to home or other facility with appropriate resources  2/27/2023 1122 by Tushar Shafer RN  Outcome: Progressing  Flowsheets (Taken 2/27/2023 0813)  Discharge to home or other facility with appropriate resources:   Identify barriers to discharge with patient and caregiver   Arrange for needed discharge resources and transportation as appropriate   Identify discharge learning needs (meds, wound care, etc)     Problem: Skin/Tissue Integrity  Goal: Absence of new skin breakdown  Description: 1. Monitor for areas of redness and/or skin breakdown  2. Assess vascular access sites hourly  3. Every 4-6 hours minimum:  Change oxygen saturation probe site  4. Every 4-6 hours:  If on nasal continuous positive airway pressure, respiratory therapy assess nares and determine need for appliance change or resting period. 2/27/2023 1122 by Tushar Shafer RN  Outcome: Progressing     Problem: Safety - Adult  Goal: Free from fall injury  2/27/2023 1122 by Tushar Shafer RN  Outcome: Progressing  Falling star prevention in place. Bed and chair alarms in use. Call light in reach. Purposeful hourly rounding.     Problem: Pain  Goal: Verbalizes/displays adequate comfort level or baseline comfort level  2/27/2023 1122 by Alex Boone RN  Outcome: Progressing  Flowsheets (Taken 2/27/2023 0813)  Verbalizes/displays adequate comfort level or baseline comfort level:   Encourage patient to monitor pain and request assistance   Assess pain using appropriate pain scale   Implement non-pharmacological measures as appropriate and evaluate response   Administer analgesics based on type and severity of pain and evaluate response     Problem: Nutrition Deficit:  Goal: Optimize nutritional status  Outcome: Progressing  Tolerating current diet and po fluids well.     Problem: Metabolic/Fluid and Electrolytes - Adult  Goal: Electrolytes maintained within normal limits  2/27/2023 1122 by Alex Boone RN  Outcome: Progressing  Flowsheets (Taken 2/27/2023 0813)  Electrolytes maintained within normal limits:   Monitor labs and assess patient for signs and symptoms of electrolyte imbalances   Administer electrolyte replacement as ordered     Problem: Metabolic/Fluid and Electrolytes - Adult  Goal: Hemodynamic stability and optimal renal function maintained  2/27/2023 1122 by Alex Boone RN  Outcome: Progressing  Flowsheets (Taken 2/27/2023 0813)  Hemodynamic stability and optimal renal function maintained:   Monitor labs and assess for signs and symptoms of volume excess or deficit   Monitor intake, output and patient weight   Monitor response to interventions for patient's volume status, including labs, urine output, blood pressure (other measures as available)     Problem: Metabolic/Fluid and Electrolytes - Adult  Goal: Glucose maintained within prescribed range  2/27/2023 1122 by Alex Boone RN  Outcome: Progressing  Flowsheets (Taken 2/27/2023 0813)  Glucose maintained within prescribed range:   Monitor blood glucose as ordered   Assess for signs and symptoms of hyperglycemia and hypoglycemia     Problem: Respiratory - Adult  Goal: Achieves optimal ventilation and oxygenation  2/27/2023 1122 by Maame Robbins RN  Outcome: Progressing  Flowsheets (Taken 2/27/2023 0813)  Achieves optimal ventilation and oxygenation:   Position to facilitate oxygenation and minimize respiratory effort   Oxygen supplementation based on oxygen saturation or arterial blood gases

## 2023-02-27 NOTE — PROGRESS NOTES
[address]  Occupational Therapy  Daily Note  Time:   Time In: 1000  Time Out: 1030  Timed Code Treatment Minutes: 30 Minutes  Minutes: 30          Date: 2023  Patient Name: Keith Ayala,   Gender: female      Room: [facility]55/055-A  MRN: [id number]  : 1950  (81 y.o.)  Referring Practitioner: Prakash Encarnacion MD  Diagnosis: closed intertrochanteric fracture, right  Additional Pertinent Hx: The patient was recently hospitalized from 2023 to 2023 initially for abdominal bloating, nausea and leg swelling. She was found to have elevated D-dimer. Subsequent testing revealed right lower lobe pulmonary embolism and right lower extremity DVT. She was at initially treated with IV heparin and later transition to [address] [place] [address]. The patient was brought to ER on 2022 because of progressively increased leg swelling, and nausea/vomiting associated with decreased oral intake. She was found to have BNP of 256.7 and Bumex was prescribed. She then had a fall accident  when she was going to toilet while she was in ER on 2022. The fall resulted severe right hip pain. X-ray of right hip and pelvis revealed acute right femoral intertrochanteric fracture with subtrochanteric extension. Orthopedic service was consulted. Xarelto was held in preparation for surgical management. Cardiology was consulted on 2023 for elevated BNP and Lasix was started. Because of difficulty voiding with urinary retention, urology was also consulted on 2023. Urology service placed Wen with some difficulty on 2023. Therefore Wen was kept in place. On 2/3/2023 the patient underwent open treatment of right intertrochanteric fracture with cephalomedullary nail by Dr. Meghan Teixeira. She is allowed weightbearing as tolerated at the right lower extremity postoperatively.     Restrictions/Precautions:  Restrictions/Precautions: General Precautions, Fall Risk, Weight Bearing  Right Lower Extremity Weight Bearing: Weight Bearing As Tolerated  Position Activity Restriction  Other position/activity restrictions: Tracheostomy/collar 6L. With venturi mask for out of room use 8 liters portable O2. SUBJECTIVE: Patient sleeping in bed upon arrival; minimal verbal stimulation to alert. Patient lethargic with difficulty remaining alert with cues throughout. Patient declined out of bed as she had only returned 30 min prior as she notes she was falling asleep in the chair. Patient questions \"is it Monday\" as patient believed it was Sunday when this therapist was discussing patient to be discharged today. PAIN: 0/10:     Vitals: Vitals not assessed per clinical judgement, see nursing flowsheet    COGNITION: Decreased Insight    ADL:   No ADL's completed this session. declined . BALANCE:  Supine in bed    ADDITIONAL ACTIVITIES:  Patient completed BUE strengthening exercises with skilled education on HEP: completed x12 reps x1 set with a moderate resistance band in all joints and all planes in order to improve UE strength and activity tolerance required for BADL routine and toilet / shower transfers. Patient tolerated poor, requiring moderate rest breaks. Patient also required minimal verbal/visual cues for technique. ASSESSMENT:     Activity Tolerance:  Patient tolerance of  treatment: Poor treatment tolerance, Limited by fatigue, and self limiting       Discharge Recommendations: Subacute/skilled nursing facility  Equipment Recommendations: Other: Monitor pending progress  Plan: Times Per Week: 5x per week for 90 minutes  Times Per Day:  Once a day  Current Treatment Recommendations: Balance training, Functional mobility training, Endurance training, Self-Care / ADL, Safety education & training, Patient/Caregiver education & training, Strengthening    Patient Education  Patient Education: Role of OT, Plan of Care, ADL's, IADL's, Home Exercise Program, Orientation, Home Safety, Importance of Increasing Activity, and Assistive Device Safety    Goals  Short Term Goals  Time Frame for Short Term Goals: until discharge  Short Term Goal 1: Pt will tolerate 12-15 min EOB ADL task with S to improve trunk control and activity tolerance required for EOB ADLs. Short Term Goal 2: Pt will complete sit-stand t/fs with Min A x 1 with minimal cues of technique to progress towards BSC t/fs  Short Term Goal 3: Pt will tolerate standing 3 min with CGA for increased ease of clothing management nad LB bathing routine  Short Term Goal 4: Pt will safely navigate short distances with no > than Min A x1 and min VC for safety to increase indep with ADLs  Long Term Goals  Time Frame for Long Term Goals : 2 weeks from IPR OT eval  Long Term Goal 1: Pt will complete sit-stand t/fs with SBA x 1 with minimal cues of technique to progress towards BSC t/fs  Long Term Goal 2: Pt will perform UB/LB dressing and bathing with Min A and minimal cues for ECT to improve functional independence. Following session, patient left in safe position with all fall risk precautions in place.

## 2023-02-27 NOTE — PROGRESS NOTES
[address]  Occupational Therapy  Daily Note  Time:   Time In: 0700  Time Out: 0800  Timed Code Treatment Minutes: 60 Minutes  Minutes: 60          Date: 2023  Patient Name: Silvia Jose,   Gender: female      Room: [id number]/055-A  MRN: [id number]  : 1950  (81 y.o.)  Referring Practitioner: Delfino Hollis MD  Diagnosis: closed intertrochanteric fracture, right  Additional Pertinent Hx: The patient was recently hospitalized from 2023 to 2023 initially for abdominal bloating, nausea and leg swelling. She was found to have elevated D-dimer. Subsequent testing revealed right lower lobe pulmonary embolism and right lower extremity DVT. She was at initially treated with IV heparin and later transition to [address] [place] [address]. The patient was brought to ER on 2022 because of progressively increased leg swelling, and nausea/vomiting associated with decreased oral intake. She was found to have BNP of 256.7 and Bumex was prescribed. She then had a fall accident  when she was going to toilet while she was in ER on 2022. The fall resulted severe right hip pain. X-ray of right hip and pelvis revealed acute right femoral intertrochanteric fracture with subtrochanteric extension. Orthopedic service was consulted. Xarelto was held in preparation for surgical management. Cardiology was consulted on 2023 for elevated BNP and Lasix was started. Because of difficulty voiding with urinary retention, urology was also consulted on 2023. Urology service placed Wen with some difficulty on 2023. Therefore Wen was kept in place. On 2/3/2023 the patient underwent open treatment of right intertrochanteric fracture with cephalomedullary nail by Dr. Yvan Nicole. She is allowed weightbearing as tolerated at the right lower extremity postoperatively.     Restrictions/Precautions:  Restrictions/Precautions: General Precautions, Fall Risk, Weight Bearing  Right Lower Extremity Weight Bearing: Weight Bearing As Tolerated  Position Activity Restriction  Other position/activity restrictions: Tracheostomy/collar 6L. With venturi mask for out of room use 8 liters portable O2. SUBJECTIVE: Patient sleeping in bed upon arrival with minimal verbal/tactile stimulation to alert. Patient requires encouragement and increased time throughout session with patient attempting to distract therapist from continuing session by asking therapist to check her vitals, check glucose, patient fidgeting with speaking valve, conversation requiring redirection to tasks. Patient states \"My  filed an appeal as we feel it is the hospitals fault as to why I fell in the ED so I should be able to continue to stay here\". PAIN: 0/10:     Vitals: Vitals not assessed per clinical judgement, see nursing flowsheet    COGNITION: WFL    ADL:   Grooming: Supervision. Seated set up provided  Bathing: Moderate Assistance. Pericare, BLE  Upper Extremity Dressing: Minimal Assistance. Footwear Management: Maximum Assistance. Toileting: Maximum Assistance. Toilet Transfer: Contact Guard Assistance. Darius Gordon BALANCE:  Sitting Balance:  Supervision. Standing Balance: Minimal Assistance. RW, no LOB. Patient able to tolerate x ~45 seconds prior to requesting seated rest break with patient resting B forearms onto RW    BED MOBILITY:  Supine to Sit: Minimal Assistance, X 1, with head of bed raised, with rail, with verbal cues , with increased time for completion RLE management    TRANSFERS:  Sit to Stand:  Contact Guard Assistance, X 1, cues for hand placement, with verbal cues. Stand to Sit: [address], X 1, with increased time for completion, cues for hand placement, with verbal cues, to/from chair with arms, assistance x 2 to scoot back into chair.       FUNCTIONAL MOBILITY:  Assistive Device: Rolling Walker  Assist Level:  Minimal Assistance, X 1, with verbal cues , and with increased time for completion. Distance:  EOB to BSC, BSC to bedside chair x 3 feet   Fatigue, increased time, forward flex posture with increased WB through BUE     ASSESSMENT:     Activity Tolerance:  Patient tolerance of  treatment: Poor treatment toleranceself limiting       Discharge Recommendations: Subacute/skilled nursing facility  Equipment Recommendations: Other: Monitor pending progress  Plan: Times Per Week: 5x per week for 90 minutes  Times Per Day: Once a day  Current Treatment Recommendations: Balance training, Functional mobility training, Endurance training, Self-Care / ADL, Safety education & training, Patient/Caregiver education & training, Strengthening    Patient Education  Patient Education: Role of OT, Plan of Care, ADL's, IADL's, Energy Conservation, Precautions, Home Safety, Importance of Increasing Activity, and Assistive Device Safety    Goals  Short Term Goals  Time Frame for Short Term Goals: until discharge  Short Term Goal 1: Pt will tolerate 12-15 min EOB ADL task with S to improve trunk control and activity tolerance required for EOB ADLs. Short Term Goal 2: Pt will complete sit-stand t/fs with Min A x 1 with minimal cues of technique to progress towards BSC t/fs  Short Term Goal 3: Pt will tolerate standing 3 min with CGA for increased ease of clothing management nad LB bathing routine  Short Term Goal 4: Pt will safely navigate short distances with no > than Min A x1 and min VC for safety to increase indep with ADLs  Long Term Goals  Time Frame for Long Term Goals : 2 weeks from [facility] OT eval  Long Term Goal 1: Pt will complete sit-stand t/fs with SBA x 1 with minimal cues of technique to progress towards BSC t/fs  Long Term Goal 2: Pt will perform UB/LB dressing and bathing with Min A and minimal cues for ECT to improve functional independence. Following session, patient left in safe position with all fall risk precautions in place.

## 2023-02-28 ENCOUNTER — APPOINTMENT (OUTPATIENT)
Dept: GENERAL RADIOLOGY | Age: 73
DRG: 560 | End: 2023-02-28
Attending: PHYSICAL MEDICINE & REHABILITATION
Payer: MEDICARE

## 2023-02-28 LAB
ANION GAP SERPL CALC-SCNC: 13 MEQ/L (ref 8–16)
BUN SERPL-MCNC: 18 MG/DL (ref 7–22)
CALCIUM SERPL-MCNC: 8.2 MG/DL (ref 8.5–10.5)
CHLORIDE SERPL-SCNC: 98 MEQ/L (ref 98–111)
CO2 SERPL-SCNC: 26 MEQ/L (ref 23–33)
CREAT SERPL-MCNC: 0.9 MG/DL (ref 0.4–1.2)
GFR SERPL CREATININE-BSD FRML MDRD: > 60 ML/MIN/1.73M2
GLUCOSE BLD STRIP.AUTO-MCNC: 102 MG/DL (ref 70–108)
GLUCOSE SERPL-MCNC: 106 MG/DL (ref 70–108)
LIPASE SERPL-CCNC: 17.6 U/L (ref 5.6–51.3)
MAGNESIUM SERPL-MCNC: 1.4 MG/DL (ref 1.6–2.4)
POTASSIUM SERPL-SCNC: 3.4 MEQ/L (ref 3.5–5.2)
SODIUM SERPL-SCNC: 137 MEQ/L (ref 135–145)

## 2023-02-28 PROCEDURE — 97530 THERAPEUTIC ACTIVITIES: CPT

## 2023-02-28 PROCEDURE — 97542 WHEELCHAIR MNGMENT TRAINING: CPT

## 2023-02-28 PROCEDURE — 2580000003 HC RX 258: Performed by: PHYSICAL MEDICINE & REHABILITATION

## 2023-02-28 PROCEDURE — 82948 REAGENT STRIP/BLOOD GLUCOSE: CPT

## 2023-02-28 PROCEDURE — 99232 SBSQ HOSP IP/OBS MODERATE 35: CPT | Performed by: PHYSICAL MEDICINE & REHABILITATION

## 2023-02-28 PROCEDURE — 97130 THER IVNTJ EA ADDL 15 MIN: CPT

## 2023-02-28 PROCEDURE — 94760 N-INVAS EAR/PLS OXIMETRY 1: CPT

## 2023-02-28 PROCEDURE — 97129 THER IVNTJ 1ST 15 MIN: CPT

## 2023-02-28 PROCEDURE — 2700000000 HC OXYGEN THERAPY PER DAY

## 2023-02-28 PROCEDURE — 80048 BASIC METABOLIC PNL TOTAL CA: CPT

## 2023-02-28 PROCEDURE — 6370000000 HC RX 637 (ALT 250 FOR IP): Performed by: FAMILY MEDICINE

## 2023-02-28 PROCEDURE — 74018 RADEX ABDOMEN 1 VIEW: CPT

## 2023-02-28 PROCEDURE — 36415 COLL VENOUS BLD VENIPUNCTURE: CPT

## 2023-02-28 PROCEDURE — 6370000000 HC RX 637 (ALT 250 FOR IP): Performed by: PHYSICAL MEDICINE & REHABILITATION

## 2023-02-28 PROCEDURE — 1180000000 HC REHAB R&B

## 2023-02-28 PROCEDURE — 97110 THERAPEUTIC EXERCISES: CPT

## 2023-02-28 PROCEDURE — 83690 ASSAY OF LIPASE: CPT

## 2023-02-28 PROCEDURE — 83735 ASSAY OF MAGNESIUM: CPT

## 2023-02-28 PROCEDURE — 97535 SELF CARE MNGMENT TRAINING: CPT

## 2023-02-28 RX ADMIN — FUROSEMIDE 40 MG: 40 TABLET ORAL at 08:09

## 2023-02-28 RX ADMIN — FLUCONAZOLE 200 MG: 200 TABLET ORAL at 08:09

## 2023-02-28 RX ADMIN — Medication 1 CAPSULE: at 08:10

## 2023-02-28 RX ADMIN — DICLOFENAC SODIUM 2 G: 10 GEL TOPICAL at 21:59

## 2023-02-28 RX ADMIN — DICLOFENAC SODIUM 2 G: 10 GEL TOPICAL at 17:27

## 2023-02-28 RX ADMIN — SODIUM CHLORIDE, PRESERVATIVE FREE 10 ML: 5 INJECTION INTRAVENOUS at 08:12

## 2023-02-28 RX ADMIN — ACETAMINOPHEN 650 MG: 325 TABLET ORAL at 08:10

## 2023-02-28 RX ADMIN — PROMETHAZINE HYDROCHLORIDE 25 MG: 25 TABLET ORAL at 22:32

## 2023-02-28 RX ADMIN — ROSUVASTATIN 10 MG: 10 TABLET, FILM COATED ORAL at 22:02

## 2023-02-28 RX ADMIN — MEGESTROL ACETATE 40 MG: 40 TABLET ORAL at 08:10

## 2023-02-28 RX ADMIN — MICONAZOLE NITRATE: 20 POWDER TOPICAL at 08:12

## 2023-02-28 RX ADMIN — PANTOPRAZOLE SODIUM 40 MG: 40 TABLET, DELAYED RELEASE ORAL at 05:47

## 2023-02-28 RX ADMIN — ASPIRIN 81 MG 81 MG: 81 TABLET ORAL at 08:10

## 2023-02-28 RX ADMIN — Medication 6 MG: at 22:00

## 2023-02-28 RX ADMIN — DOCUSATE SODIUM 100 MG: 100 CAPSULE, LIQUID FILLED ORAL at 22:01

## 2023-02-28 RX ADMIN — ACETAMINOPHEN 650 MG: 325 TABLET ORAL at 22:00

## 2023-02-28 RX ADMIN — Medication 1 TABLET: at 08:10

## 2023-02-28 RX ADMIN — MICONAZOLE NITRATE: 20 POWDER TOPICAL at 22:02

## 2023-02-28 RX ADMIN — ACETAMINOPHEN 650 MG: 325 TABLET ORAL at 03:23

## 2023-02-28 RX ADMIN — DICLOFENAC SODIUM 2 G: 10 GEL TOPICAL at 13:33

## 2023-02-28 RX ADMIN — INSULIN GLARGINE 20 UNITS: 100 INJECTION, SOLUTION SUBCUTANEOUS at 21:57

## 2023-02-28 RX ADMIN — METOLAZONE 2.5 MG: 2.5 TABLET ORAL at 08:09

## 2023-02-28 RX ADMIN — ACETAMINOPHEN 650 MG: 325 TABLET ORAL at 15:47

## 2023-02-28 RX ADMIN — DICLOFENAC SODIUM 2 G: 10 GEL TOPICAL at 08:11

## 2023-02-28 RX ADMIN — FOLIC ACID 1 MG: 1 TABLET ORAL at 08:10

## 2023-02-28 RX ADMIN — FERROUS SULFATE TAB 325 MG (65 MG ELEMENTAL FE) 325 MG: 325 (65 FE) TAB at 08:10

## 2023-02-28 RX ADMIN — LOPERAMIDE HYDROCHLORIDE 2 MG: 2 CAPSULE ORAL at 08:09

## 2023-02-28 RX ADMIN — QUETIAPINE FUMARATE 25 MG: 25 TABLET ORAL at 22:02

## 2023-02-28 RX ADMIN — POTASSIUM CHLORIDE 40 MEQ: 1500 TABLET, EXTENDED RELEASE ORAL at 19:50

## 2023-02-28 RX ADMIN — PROMETHAZINE HYDROCHLORIDE 25 MG: 25 TABLET ORAL at 15:57

## 2023-02-28 RX ADMIN — SENNOSIDES 17.2 MG: 8.6 TABLET, FILM COATED ORAL at 22:00

## 2023-02-28 RX ADMIN — TRAZODONE HYDROCHLORIDE 100 MG: 100 TABLET ORAL at 22:02

## 2023-02-28 RX ADMIN — PROMETHAZINE HYDROCHLORIDE 25 MG: 25 TABLET ORAL at 08:24

## 2023-02-28 RX ADMIN — METOPROLOL SUCCINATE 25 MG: 25 TABLET, FILM COATED, EXTENDED RELEASE ORAL at 08:10

## 2023-02-28 RX ADMIN — RIVAROXABAN 20 MG: 20 TABLET, FILM COATED ORAL at 17:27

## 2023-02-28 RX ADMIN — SODIUM CHLORIDE, PRESERVATIVE FREE 10 ML: 5 INJECTION INTRAVENOUS at 22:00

## 2023-02-28 ASSESSMENT — PAIN DESCRIPTION - ORIENTATION
ORIENTATION: RIGHT
ORIENTATION: RIGHT

## 2023-02-28 ASSESSMENT — PAIN - FUNCTIONAL ASSESSMENT: PAIN_FUNCTIONAL_ASSESSMENT: ACTIVITIES ARE NOT PREVENTED

## 2023-02-28 ASSESSMENT — PAIN DESCRIPTION - ONSET: ONSET: ON-GOING

## 2023-02-28 ASSESSMENT — PAIN DESCRIPTION - DESCRIPTORS
DESCRIPTORS: ACHING
DESCRIPTORS: ACHING

## 2023-02-28 ASSESSMENT — ENCOUNTER SYMPTOMS
DIARRHEA: 0
RHINORRHEA: 0
COUGH: 0
SORE THROAT: 0
ABDOMINAL PAIN: 0
TROUBLE SWALLOWING: 0
SHORTNESS OF BREATH: 0
BACK PAIN: 1
CONSTIPATION: 0
WHEEZING: 0
VOMITING: 0
NAUSEA: 1

## 2023-02-28 ASSESSMENT — PAIN DESCRIPTION - FREQUENCY: FREQUENCY: CONTINUOUS

## 2023-02-28 ASSESSMENT — PAIN SCALES - GENERAL
PAINLEVEL_OUTOF10: 7
PAINLEVEL_OUTOF10: 8

## 2023-02-28 ASSESSMENT — PAIN DESCRIPTION - LOCATION
LOCATION: HIP
LOCATION: HIP

## 2023-02-28 ASSESSMENT — PAIN DESCRIPTION - PAIN TYPE: TYPE: SURGICAL PAIN

## 2023-02-28 NOTE — PROGRESS NOTES
[facility]  INPATIENT PHYSICAL THERAPY  PROGRESS NOTE  [address] [facility],[address] - [id number]    Time In: 0700  Time Out: 0800  Timed Code Treatment Minutes: 60 Minutes  Minutes: 60          Date: 2023  Patient Name: Mechelle Stephen,  Gender:  female        MRN: [id number]  : 1950  (81 y.o.)     Referring Practitioner: Margaret Cantrell MD  Diagnosis: closed intertrochanteric fx, Rt  Additional Pertinent Hx: Leyla Wynn is a 73yoF with PMH of recent PE and b/l DVT, macrocytic anemia, CAD, DM2, tracheostomy dependent, obesity, and HTN who presents for leg swelling, nausea, and constipation. She was recently discharged after an 8 day admission for DVTs and PEs. She was placed on Xarelto when discharged and stated that she has been compliant and only missed one dose. She returned due to chronic constipation, feeling ''bloated'', nausea, and LE edema. While being evaluated in the ED the pt had a mechanical fall and developed a R femur fracture. She was found to have supratheraputic INR at 3.83. CT head and C-spine both clear. Pt is s/p R femur ORIF by Dr Corey Rodriguez . Pt found to have acute fractures of L2-S1 likely 2/2 osteoporosis, ok for activity as tolerated per Dr Brock Malhotra . Prior Level of Function:  Lives With: Spouse  Type of Home: House  Home Layout: One level  Home Access: Ramped entrance (or 1 step with no rails.)  Home Equipment: Dulce Romero, rolling, Lift chair (Pt sleeps in lift chair and was using it to assist up to stand PTA)   Bathroom Shower/Tub: Walk-in shower  Bathroom Toilet: Standard  Bathroom Equipment: Built-in shower seat, 3-in-1 commode    ADL Assistance: Needs assistance  Homemaking Assistance: Needs assistance  Ambulation Assistance: Independent  Transfer Assistance: Independent  Active : No  Additional Comments: pt amb short distances in the home with RW,  available / and able to assist as needed.  Has a passi valve that she uses at rest.    Restrictions/Precautions:  Restrictions/Precautions: General Precautions, Fall Risk, Weight Bearing  Right Lower Extremity Weight Bearing: Weight Bearing As Tolerated  Position Activity Restriction  Other position/activity restrictions: Tracheostomy/collar 6L. With venturi mask for out of room use 8 liters portable O2. SUBJECTIVE: Pt resting in bed. Stated she thought she'd starting having a bowel movement. Therapist assisted pt to bedside commode. Incontinent of small amt of bowel. Completed on commode. Mod amt, diarrhea. Nsg notified. Therapist performed pericare and clothing management. PAIN: not rated/10: no c/o pain during session. Vitals: Vitals not assessed per clinical judgement, see nursing flowsheet    OBJECTIVE:  Bed Mobility:  Supine to Sit: Stand By Assistance, with head of bed raised approx 30 degrees, with rail    Transfers:  Sit to Stand: Minimal Assistance, from bed and commode. CGA from recliner. Stand to Sit:Minimal Assistance, with verbal cues, to fully align and reach back with arms to armrests    Ambulation:  Minimal Assistance  Distance: 4 ft  Surface: Level Tile  Device:Rolling Walker  Gait Deviations:  marked forward Flexed Posture, Decreased Step Length Bilaterally, Decreased Gait Speed, and from commode to recliner. Decreased wt shift to RT noted with stance. Balance:  Static Sitting Balance:  Modified Independent  Dynamic Sitting Balance: Modified Independent, with cues for more erect trunk during seated ex  Static Standing Balance: Contact Guard Assistance, with RW support during pericare and clothing management. Pt stood 4 times to complete. Approx 1 min /stand. Standing near end of session again attempted for increased duration, but pt tolerated approx 20 sec due to feeling of abdominal cramping. Exercise:     Exercises were completed for increased independence with functional mobility.   Seated- glute and quad sets, margarita ankle pumps, long arc quads with verbal cues to gain and maintain TKE RLE, margarita hip ADD isometrics with pt maintaining an erect trunk, BUE diagonals to ea direction with pt maintain erect trunk, margarita scapular retraction with erect trunk x10 reps. Numerous rest breaks taken per pt request.      Functional Outcome Measures: Not completed       ASSESSMENT:  Assessment: Patient progressing toward established goals, meeting 5/5 short term goals and 3 long term goals. Pt has progressed to the sBA bed mobility level with rail assist and HOB elevated. Will progress to no rail and bed flat. Pt is now at min assist for transfers and has walked 4 ft with RW, though marked forward flexed posture noted and short step length. Pt will benefit from continued skilled PT to further advance her transfers and gait for more improved posture, increased standing tolerance and gait endurance. Activity Tolerance:  Patient tolerance of  treatment: fair.  Limited by bowel movement     Equipment Recommendations:Equipment Needed: No  Other: Pt has RW, manual w/c and mechanical lift device  Discharge Recommendations: Continue to assess pending progress and Patient would benefit from continued PT at discharge  Plan: Current Treatment Recommendations: Strengthening, Balance training, Functional mobility training, Transfer training, Endurance training, Equipment evaluation, education, & procurement, Patient/Caregiver education & training, Safety education & training, Therapeutic activities, Home exercise program, Wheelchair mobility training, Gait training, Pain management  General Plan:  (5x/ wk 90 min)    Patient Education  Patient Education: Home Exercise Program, Bed Mobility, Transfers, Gait,  - Patient Verbalized Understanding, - Patient Requires Continued Education    Goals:  Patient Goals : get around o my own without the RW  Short Term Goals  Time Frame for Short Term Goals: 1 wk  Short Term Goal 1: Pt will go from supine <->sit, mod +1 to get in/out of bed. MET, see LTG  Short Term Goal 2: Pt will get up/down from bed, into JEANNETTE stedy device, +1 mod assist to progress to up to RW GOAL MET, SEE LTG  Short Term Goal 3: Pt will  the JEANNETTE stedy device, +1 min assist x5 min, to progress to standing with RW MET, see LTG  Short Term Goal 4: Pt will propel w/c >= 50 ft, tile surface, CGA for home mobility. GOAL MET, SEE LTG  Short Term Goal 5: Pt to transfer sit <--> stand min A for increased functional mobility. MET, see LTG  Long Term Goals  Time Frame for Long Term Goals : 3 wks from evaluation. Long Term Goal 1: Pt to go supine <->sit, min +1 to get in/out of bed. MET, with rail and HOB elevated. See revised goal  Long Term Goal 2: Pt to get up/down from bed or w/c +1 min assist to get up to walk. MET, see revised goal  Long Term Goal 3: Pt to perform stand/pivot transfer with RW, +1 min assist to get in/out of w/c. MET, see revised goal  Long Term Goal 4: Pt to walk with RW >= 10 ft, mn +1 to progress to home mobility NOT MET, continue  Long Term Goal 5: Pt to propel w/c >= 50 ft, Mod I, for home mobility NOT ADDRESSED, continue  Additional Goals?: Yes  Long term goal 6: Pt to get in/out of car, min +1 for transportation needs. NOT ADDRESSED, continued  Revised Long-Term Goals  Long Term Goals  Time Frame for Long Term Goals : 3 wks from evaluation. Long Term Goal 1: Pt to go supine <->sit, min +1 to get in/out of bed, bed flat, no rail. Long Term Goal 2: Pt to get up/down from bed or w/c +1 contact guard assist, consistently, to get up to walk. Long Term Goal 3: Pt to perform stand/pivot transfer with RW, +1 contact guard assist to get in/out of w/c. Long Term Goal 4: Pt to walk with RW >= 10 ft, mn +1 to progress to home mobility  Long Term Goal 5: Pt to propel w/c >= 50 ft, Mod I, for home mobility  Additional Goals?: Yes  Long term goal 6: Pt to get in/out of car, min +1 for transportation needs.      Following session, patient left in safe position with all fall risk precautions in place.

## 2023-02-28 NOTE — PROGRESS NOTES
Comprehensive Nutrition Assessment    Type and Reason for Visit:  Reassess    Nutrition Recommendations/Plan:   Diet and ONS (Glucerna TID) as ordered  Recommend continue MVI  Encouraged po, good nutrition at best efforts for healing      Malnutrition Assessment:  Malnutrition Status: At risk for malnutrition (Comment) (02/13/23 1215)    Context:  Acute Illness     Findings of the 6 clinical characteristics of malnutrition:  Energy Intake:  Mild decrease in energy intake (Comment)  Weight Loss:  Unable to assess (difficult to evaluate w/ edema)     Body Fat Loss:  No significant body fat loss     Muscle Mass Loss:  No significant muscle mass loss    Fluid Accumulation:  Unable to assess     Strength:  Not Performed    Nutrition Assessment:     Pt. with no improvement from nutritional standpoint AEB poor appetite continues however does report good acceptance of Glucerna supplement. At risk for further nutrition compromise r/t increased nutrient needs for wound healing, underlying medical condition (hx CAD, COVID, DM, DVT, HLD HTN, PE, trach 4/22). Nutrition Related Findings:    Pt. Report/Treatments/Miscellaneous: Pt eating lunch; continued varied intake, however good acceptance of ONS; Planning dc 2/28, however pt  file appeal to insurance for longer IPR stay. GI Status: BM 2/28  Pertinent Labs: POC glucose 102  Pertinent Meds: Colace, Iron, Folic Acid, Lasix, Lantus, Culturelle, Megace, MVI, Protonix, MVI, Senokot     Wound Type: Deep Tissue Injury, Surgical Incision (2/3 Open treatment right intertrochanteric femur fracture the cephalomedullary nail; abdomen skin tear; buttocks DTI)       Current Nutrition Intake & Therapies:    Average Meal Intake: 1-25%, 26-50%, 51-75%  Average Supplements Intake: %  ADULT DIET; Regular; 4 carb choices (60 gm/meal);  Low Sodium (2 gm); 2000 ml  ADULT ORAL NUTRITION SUPPLEMENT; Breakfast, Dinner, Lunch; Diabetic Oral Supplement    Anthropometric Measures:  Height: 5' 2\" (157.5 cm)  Ideal Body Weight (IBW): 110 lbs (50 kg)    Admission Body Weight: 225 lb 1.6 oz (102.1 kg) (2/10 trace, +1 edema)  Current Body Weight: 206 lb 9 oz (93.7 kg) (2/28; Trace to +2 edema),   IBW. Current BMI (kg/m2): 37.8  Usual Body Weight:  (per EMR: 4/13/22: 192# 2 oz, 1/21/23: 174# 14 oz, 1/26/23: 210# 12 oz)     Weight Adjustment For: No Adjustment                 BMI Categories: Obese Class 3 (BMI 40.0 or greater)    Estimated Daily Nutrient Needs:  Energy Requirements Based On: Kcal/kg  Weight Used for Energy Requirements: Other (Comment) (105)  Energy (kcal/day): 5163-2549 kcals (12-15)  Weight Used for Protein Requirements: Ideal (50)  Protein (g/day):  grams (1.5-2)         Nutrition Diagnosis:   Increased nutrient needs related to increase demand for energy/nutrients as evidenced by wounds    Nutrition Interventions:   Food and/or Nutrient Delivery: Continue Current Diet, Continue Oral Nutrition Supplement  Nutrition Education/Counseling: Education initiated (2/13 Encouraged po, ONS intake at best efforts for healing.)  Coordination of Nutrition Care: Continue to monitor while inpatient       Goals:  Previous Goal Met: No Progress toward Goal(s)  Goals: PO intake 75% or greater, by next RD assessment       Nutrition Monitoring and Evaluation:   Behavioral-Environmental Outcomes: None Identified  Food/Nutrient Intake Outcomes: Diet Advancement/Tolerance, Food and Nutrient Intake, Supplement Intake  Physical Signs/Symptoms Outcomes: Biochemical Data, Chewing or Swallowing, GI Status, Fluid Status or Edema, Nutrition Focused Physical Findings, Skin, Weight    Discharge Planning:     Too soon to determine     Rosa Silva, [address]  Contact: [phone]

## 2023-02-28 NOTE — PROGRESS NOTES
[address] NOTE    Conference Date: 3/1/2023  Admit Date:  2/10/2023  2:59 PM  Patient Name: Efrain Szymanski    MRN: [id number]    : 1950  (81 y.o.)  Rehabilitation Admitting Diagnosis:  Closed intertrochanteric fracture, right, initial encounter [facility]) [Z01.629F]  Referring Practitioner: Ricky Salvador MD      CASE MANAGEMENT  Current issues/needs regarding patient and family discharge status:   Patient had a discharge date of  appealed discharge. Appeal is still under review. PHYSICAL THERAPY  Assessment: Patient progressing toward established goals, meeting 5/5 short term goals and 3 long term goals. Pt has progressed to the sBA bed mobility level with rail assist and HOB elevated. Will progress to no rail and bed flat. Pt is now at min assist for transfers and has walked 4 ft with RW, though marked forward flexed posture noted and short step length. Pt will benefit from continued skilled PT to further advance her transfers and gait for more improved posture, increased standing tolerance and gait endurance. Equipment Needed: No  Other: Pt has RW, manual w/c and mechanical lift device    SPEECH THERAPY  Patient has met 2/4 STGs and 0/1 LTGs this reporting period. Patient has made gains in recall, thought organization, sequencing, and attention. Patient continues to demonstrate deficits in complex executive functioning and mental manipulation and often requires cuing for attention to detail within medication, money, and time management tasks. Expressive and receptive language [facility]. Speech and voice WFL with utilization of PMV. Patient consuming a regular texture diet and thin liquids. Patient would benefit from continued skilled ST services to address aforementioned cognitive skills.  At this time, patient would benefit from skilled ST services via SNF upon discharge to further address aforementioned deficits in order to safely discharge home and resume ADL/IADL completion with least amount of supervision/assistance. Diego SKimberly is making slow steady progress towards therapy goals during stay on IPR thus far, meeting 2/5 STGs. She has made progression in UB dressing with ability to complete with set up while seated upright. She continues to require maxA for LB dressing/bathing tasks. Jazlyn Zambrano continues to be dep for footwear mgmt. Jazlyn Zambrano currently requires Mod A for toileting hygiene, requiring multiple standing trials d/t fatigue and A with hygiene and has demoed improvements in indep with clothing mgmt. Jazlyn Zambrano has progressed to primary use of stand pivot t/fs and short distance functional mobility with Min A x1 and increased time. Pt continues to be highly limited by decreased endurance and requiring frequent rest breaks. She continues to exhibit decreased strength, activity tolerance, increase of pain in RLE with movement, increased need for assist during BADL routine which limits her from meeting her goals. Jazlyn Zambrano is typically independent with BADL routine with SBA from  for safety measures. She is currently using 6L via trach mask. Pt continues to require skilled OT intervention to improve strength, activity tolerance, safety awareness, ADL/IADL performance required for pt to return home [facility]. Without skilled services patient is at risk for falls, decrease in overall function and increased caregiver burden. Other: Monitor pending progress    RECREATIONAL THERAPY  Patient has been offered participation in recreational therapy activities and participates as able. NUTRITION  Weight: 206 lb 9 oz (93.7 kg) / Body mass index is 37.78 kg/m². Current diet: ADULT DIET; Regular; 4 carb choices (60 gm/meal); Low Sodium (2 gm); 2000 ml  ADULT ORAL NUTRITION SUPPLEMENT; Breakfast, Dinner, Lunch; Diabetic Oral Supplement  Please see nutrition note for details. NURSING  Continent of Bowel: Yes.   Frequency: Incontinent of bowel at times due to diarrhea. Management: Immodium; Lactobacillus, Colace, senna, glycolax, MOM, enemeex, dulcolax suppository. Continent of Bladder: Yes. Frequency: Wen. Management: Wen. Pain is Managed:  Yes. Management: Tyenol scheduled Q6H; Ultram (Infrequent) Voltaren GEL scheduled BID. Frequency of Intervention: Tylenol scheduled. Adequately Controlled: Yes  Sleep: Adequate and Interventions to Support Sleep: Melatonin and Trazodone  Signs and Symptoms of Infection:  No.   Signs and Symptoms of Skin Breakdown:  Yes. Site: Buttocks. Wound Care: Triad, EPC, Micotin powder. Injury and/or Falls during Inpatient Rehabilitation Admission: No  Anticoagulants: ASA, Xarelto  Diabetic: Yes: Home Meds: Novolog 10 units + sliding scale at lunch and dinner. Hospital Meds: Lantus 20 units at HS. Educational Needs: None. Consultations/Labs/X-rays: None  Oxygen while on IP Rehab:  Yes Currently using  5 liters per trach mask  . Home oxygen: Yes  Patient/Family Education Focus: Trach education   Barriers to Education: Patient has developed bad habits with trach care.     Recent Labs     02/27/23  0713 02/28/23  0807 03/01/23  0351   POCGLU 112* 102 95       No results found for: LDLCALC, LDLCHOLESTEROL, LDLDIRECT      Vitals:    02/28/23 2114 02/28/23 2139 03/01/23 0530 03/01/23 0811   BP:  133/74  120/80   Pulse: 81 88 85 99   Resp: 16 18 18 18   Temp:  99.7 °F (37.6 °C)  98.1 °F (36.7 °C)   TempSrc:  Oral  Oral   SpO2: 98% 100% 93% 100%   Weight:       Height:              Family Education: Family available and participating in education   Fall Risk:  Falling star program initiated  Is the patient appropriate for a stay in the functional apartment? no    Discharge Plan   Estimated Discharge Date:  pending on insurance appeal decision    Destination: skilled nursing facility  Services at Discharge: SNF Physical Therapy, Occupational Therapy, Speech Therapy, Nursing, and nursing aide daily  Is patient appropriate for an outpatient driving evaluation? yes, but until after clearance by surgeon  Equipment at Discharge: Other: Monitor pending progress  Other: Pt has RW, manual w/c and mechanical lift device  Factors facilitating achievement of predicted outcomes: Family support, Motivated, Cooperative, and Pleasant  Barriers to the achievement of predicted outcomes: Pain, Anxiety, Limited insight into deficits, Unrealistic expectations, Decreased endurance, Lower extremity weakness, Medical complications, Stairs at home, Skin Care, Wound Care, and tracheostomy care  Follow up with physiatrist? no  If yes, what timeframe? N/A    Team Members Present at Conference:  [place], [facility]  Occupational Therapist:Corie Frost OTR/L [id number]  Physical Therapist:Stella Woodward, [address] [place] [address], [address]  Jason Carson, YVETTE  Psychologist: Tyron Waters, PhD.    I approve the established interdisciplinary plan of care as documented within the medical record of Imra Ricks.     Kurt Runner, MD

## 2023-02-28 NOTE — PROGRESS NOTES
[address] [place] THERAPY  [facility]  PROGRESS NOTE    TIME   SLP Individual Minutes  Time In: 0830  Time Out: 0900  Minutes: 30  Timed Code Treatment Minutes: 30 Minutes       Date: 2023  Patient Name: Nimo Maloney      CSN: [id number]   : 1950  (81 y.o.)  Gender: female   Referring Physician:  Tereza Bergeron MD  Diagnosis: Closed intertrochanteric fracture, right,  initial encounter  Precautions: Fall risk  Current Diet: Regular and Thin Liquids   Swallowing Strategies: Standard Universal Swallow Precautions  Date of Last MBS/FEES: Not Applicable    Pain:  0/25 - Pain location: R hip - RN aware      Subjective:  Patient sitting in recliner upon ST arrival. Agreeable to skilled ST services. Daughter present during session. Pleasant, cooperative and engaged throughout. Short-Term Goals:  SHORT TERM GOAL #1:  Goal 1: Patient will complete functional problem solving and reasoning tasks with 80% accuracy and min cuing in order to improve completion of ADLs/IADLs at discharge. - GOAL NOT MET; CONTINUE  INTERVENTIONS:   Time Management - Bank hours  3/6 independent, 3/6 given min-mod cues    *patient with decreased mental math computations of time variables; increased success given cuing  *patient with good visual scanning and thought organization on task this date    Novel Card Game - [address] in [facility]  Patient with good execution of game this date. Patient with decreased recall; required cuing to replace slot with card from hand. Patient with good visual scanning and attention to detail throughout game.     SHORT TERM GOAL #2:  Goal 2: Patient will complete functional immediate, working, and delayed recall tasks of 4+ units of information, with or without use of trained recall strategies, with 85% accuracy given min cues to improve retention of pertinent medical information  INTERVENTIONS: - GOAL MET; REVISE  REVISED: Goal 2: Patient will complete functional immediate, working, and delayed recall tasks of 6+ units of information, with or without use of trained recall strategies, with 85% accuracy given min cues to improve retention of pertinent medical information. Recall of Novel Card Game - [address] in [place]  (Name, setup, how to play, how to win)  Immediate recall: 2/4 independent, 1/4 given mod cue (name), 1/4 unable to elicit (number of cards)  Delayed (15 min): 4/4 independent    SHORT TERM GOAL #3:  Goal 3: Patient will complete functional thought organization and sequencing exercises with 90% accuracy and min cuing  in order to improve  completion  of  ADLs/IADLs. - GOAL NOT MET; CONTINUE  INTERVENTIONS: Did not address d/t focus on other goals. PRIOR SESSION  Sequencing 5 step ADL/IADLs  4/9 independent, 4/9 given min cues, 1/9 given mod verbal cues     *patient with decreased attention to detail; required cuing to check other steps; self correction after given cue with good reasoning for sequencing  *patient with increased time to complete task this date    SHORT TERM GOAL #4:  Goal 4: Patient will complete sustained, selective, alternating, and divided attention tasks with no more than three  errors/redirections in five minutes/one task in order to allow for safe return to driving at discharge. - GOAL MET; REVISE  REVISED: Goal 4: Patient will complete complex sustained, selective, alternating, and divided attention tasks with no more than three  errors/redirections in five minutes/one task in order to allow for safe return to driving at discharge. INTERVENTIONS: Did not address this session d/t focus on other goals. PRIOR SESSION   Divided Attention  ST flipping one card from deck at a time. Patient prompted to say \"elephant\" for even cards, \"octopus\" for odd cards, and \"lehman\" for face cards.      *patient required max cues to recall corresponding animal to given card  *patient with x5 errors with x4 self corrections in 6:25 minutes  *patient with increased time to complete task this date d/t increased fatigue  *patient with good utilization of self-talk throughout task    Long-Term Goals:  Time Frame for Long Term Goals: 3 weeks    LONG TERM GOAL #1:  Goal 1: Patient will improve cognitive  functioning  to a level of modified independent in order to allow for safe return to OF. - GOAL NOT MET       Comprehension: 5 - Patient understands basic needs (hungry/hot/pain)  Expression: 5 - Expresses basic ideas/needs only (hungry/hot/pain)  Social Interaction: 5 - Patient is appropriate with supervision/cues  Problem Solvin - Patient solves simple/routine tasks 75-90%+   Memory: 5 - Patient requires prompting with stress/unfamiliar situations    EDUCATION:  Learner: Patient  Education:  Reviewed ST goals and Plan of Care and Education Related to [employer] [facility]ness  Evaluation of Education: Verbalizes understanding, Demonstrates with assistance, and Needs further instruction    ASSESSMENT/PLAN:  Summary: Patient has met 2/4 STGs and 0/1 LTGs this reporting period. Patient has made gains in recall, thought organization, sequencing, and attention. Patient continues to demonstrate deficits in complex executive functioning and mental manipulation and often requires cuing for attention to detail within medication, money, and time management tasks. Expressive and receptive language [facility]. Speech and voice WFL with utilization of PMV. Patient consuming a regular texture diet and thin liquids. Patient would benefit from continued skilled ST services to address aforementioned cognitive skills. At this time, patient would benefit from skilled ST services via SNF upon discharge to further address aforementioned deficits in order to safely discharge home and resume ADL/IADL completion with least amount of supervision/assistance. Activity Tolerance:  Patient tolerance of  treatment: good.       Assessment/Plan: Patient progressing toward established goals. Continues to require skilled care of licensed speech pathologist to progress toward achievement of established goals and plan of care. Plan for Next Session: delayed recall, finances, attention  Discharge Recommendations: ROBIN CRASON  Clinician

## 2023-02-28 NOTE — PROGRESS NOTES
SERVANDO called [facility] talked to [facility] and she said they will not have any beds open this week.

## 2023-02-28 NOTE — PROGRESS NOTES
[address] PHYSICAL THERAPY  DAILY NOTE  [address] [facility],[address] Floor - [id number][id number]    Time In: 1330  Time Out: 1400  Timed Code Treatment Minutes: 30 Minutes  Minutes: 30          Date: 2023  Patient Name: Keny Villalpando,  Gender:  female        MRN: [id number]  : 1950  (81 y.o.)     Referring Practitioner: Bao Mcgowan MD  Diagnosis: closed intertrochanteric fx, Rt  Additional Pertinent Hx: Ally Howe is a 73yoF with PMH of recent PE and b/l DVT, macrocytic anemia, CAD, DM2, tracheostomy dependent, obesity, and HTN who presents for leg swelling, nausea, and constipation. She was recently discharged after an 8 day admission for DVTs and PEs. She was placed on Xarelto when discharged and stated that she has been compliant and only missed one dose. She returned due to chronic constipation, feeling ''bloated'', nausea, and LE edema. While being evaluated in the ED the pt had a mechanical fall and developed a R femur fracture. She was found to have supratheraputic INR at 3.83. CT head and C-spine both clear. Pt is s/p R femur ORIF by Dr Eddie Jack . Pt found to have acute fractures of L2-S1 likely 2/2 osteoporosis, ok for activity as tolerated per Dr Celso West . Prior Level of Function:  Lives With: Spouse  Type of Home: House  Home Layout: One level  Home Access: Ramped entrance (or 1 step with no rails.)  Home Equipment: Shad Menjivar, rolling, Lift chair (Pt sleeps in lift chair and was using it to assist up to stand PTA)   Bathroom Shower/Tub: Walk-in shower  Bathroom Toilet: Standard  Bathroom Equipment: Built-in shower seat, 3-in-1 commode    ADL Assistance: Needs assistance  Homemaking Assistance: Needs assistance  Ambulation Assistance: Independent  Transfer Assistance: Independent  Active : No  Additional Comments: pt amb short distances in the home with RW,  available / and able to assist as needed.  Has a passi valve that she uses at rest.    Restrictions/Precautions:  Restrictions/Precautions: General Precautions, Fall Risk, Weight Bearing  Right Lower Extremity Weight Bearing: Weight Bearing As Tolerated  Position Activity Restriction  Other position/activity restrictions: Tracheostomy/collar 6L. With venturi mask for out of room use 8 liters portable O2. SUBJECTIVE: Pt seated in recliner. Scooted out. Nsg assisted with repositioning. PAIN: not rated/10: Voltaren gel applied to Rt hip at start of session by nsg. Vitals: Vitals not assessed per clinical judgement, see nursing flowsheet    OBJECTIVE:  Bed Mobility:  Sit to Supine: Minimal Assistance, with RLE onto bed. Once supine, pt used rails and min assist to align. Transfers:  Sit to Stand: Contact Guard Assistance  Stand to [address] [facility], to [facility]. Cues to fully align and reach back with UEs  Stand Pivot:Minimal Assistance, with RW. Forward flexed trunk, small steps. X2 trials. Pt takes 4-5 small steps to complete. Wheelchair Mobility:  Modified Independent  Extremities Used: Bilateral Upper Extremities  Type of Chair:Manual  Surface: Level Tile  Distance: 110 ft  Quality: Slow Velocity, Short Strokes, and veered to RT, self corrected. Functional Outcome Measures: Not completed       ASSESSMENT:  Assessment: Patient progressing toward established goals. Activity Tolerance:  Patient tolerance of  treatment: good.       Equipment Recommendations:Equipment Needed: No  Other: Pt has RW, manual w/c and mechanical lift device  Discharge Recommendations: Continue to assess pending progress and Patient would benefit from continued PT at discharge  Plan: Current Treatment Recommendations: Strengthening, Balance training, Functional mobility training, Transfer training, Endurance training, Equipment evaluation, education, & procurement, Patient/Caregiver education & training, Safety education & training, Therapeutic activities, Home exercise program, Wheelchair mobility training, Gait training, Pain management  General Plan:  (5x/ wk 90 min)    Patient Education  Patient Education: Bed Mobility, Transfers, Wheelchair Mobility    Goals:  Patient Goals : get around o my own without the RW  Short Term Goals  Time Frame for Short Term Goals: 1 wk  Short Term Goal 1: Pt will go from supine <->sit, mod +1 to get in/out of bed. MET, see LTG  Short Term Goal 2: Pt will get up/down from bed, into JEANNETTE stedy device, +1 mod assist to progress to up to RW GOAL MET, SEE LTG  Short Term Goal 3: Pt will  the JEANNETTE stedy device, +1 min assist x5 min, to progress to standing with RW MET, see LTG  Short Term Goal 4: Pt will propel w/c >= 50 ft, tile surface, CGA for home mobility. GOAL MET, SEE LTG  Short Term Goal 5: Pt to transfer sit <--> stand min A for increased functional mobility. MET, see LTG  Long Term Goals  Time Frame for Long Term Goals : 3 wks from evaluation. Long Term Goal 1: Pt to go supine <->sit, min +1 to get in/out of bed, bed flat, no rail. Long Term Goal 2: Pt to get up/down from bed or w/c +1 contact guard assist, consistently, to get up to walk. Long Term Goal 3: Pt to perform stand/pivot transfer with RW, +1 contact guard assist to get in/out of w/c. Long Term Goal 4: Pt to walk with RW >= 10 ft, mn +1 to progress to home mobility  Long Term Goal 5: Pt to propel w/c >= 50 ft, Mod I, for home mobility  Additional Goals?: Yes  Long term goal 6: Pt to get in/out of car, min +1 for transportation needs. Following session, patient left in safe position with all fall risk precautions in place.

## 2023-02-28 NOTE — PLAN OF CARE
Problem: Chronic Conditions and Co-morbidities  Goal: Patient's chronic conditions and co-morbidity symptoms are monitored and maintained or improved  Outcome: Progressing  Flowsheets (Taken 2/28/2023 0804)  Care Plan - Patient's Chronic Conditions and Co-Morbidity Symptoms are Monitored and Maintained or Improved:   Monitor and assess patient's chronic conditions and comorbid symptoms for stability, deterioration, or improvement   Update acute care plan with appropriate goals if chronic or comorbid symptoms are exacerbated and prevent overall improvement and discharge     Problem: Discharge Planning  Goal: Discharge to home or other facility with appropriate resources  Outcome: Progressing  Flowsheets (Taken 2/28/2023 0804)  Discharge to home or other facility with appropriate resources:   Identify barriers to discharge with patient and caregiver   Arrange for needed discharge resources and transportation as appropriate   Identify discharge learning needs (meds, wound care, etc)     Problem: Skin/Tissue Integrity  Goal: Absence of new skin breakdown  Description: 1. Monitor for areas of redness and/or skin breakdown  2. Assess vascular access sites hourly  3. Every 4-6 hours minimum:  Change oxygen saturation probe site  4. Every 4-6 hours:  If on nasal continuous positive airway pressure, respiratory therapy assess nares and determine need for appliance change or resting period. 2/28/2023 1631 by Katelyn Combs RN  Outcome: Progressing     Problem: Safety - Adult  Goal: Free from fall injury  2/28/2023 1631 by Katelyn Combs RN  Outcome: Progressing  Falling star prevention in place. Bed and chair alarms in use. Call light in reach. Purposeful hourly rounding.     Problem: Pain  Goal: Verbalizes/displays adequate comfort level or baseline comfort level  2/28/2023 1631 by Katelyn Combs RN  Outcome: Progressing  Flowsheets (Taken 2/28/2023 0804)  Verbalizes/displays adequate comfort level or baseline comfort level:   Encourage patient to monitor pain and request assistance   Assess pain using appropriate pain scale   Implement non-pharmacological measures as appropriate and evaluate response   Administer analgesics based on type and severity of pain and evaluate response     Problem: Nutrition Deficit:  Goal: Optimize nutritional status  Outcome: Progressing  Tolerating current diet and po fluids well.     Problem: Metabolic/Fluid and Electrolytes - Adult  Goal: Electrolytes maintained within normal limits  Outcome: Progressing  Flowsheets (Taken 2/28/2023 0804)  Electrolytes maintained within normal limits:   Monitor labs and assess patient for signs and symptoms of electrolyte imbalances   Administer electrolyte replacement as ordered     Problem: Metabolic/Fluid and Electrolytes - Adult  Goal: Hemodynamic stability and optimal renal function maintained  Outcome: Progressing  Flowsheets (Taken 2/28/2023 0804)  Hemodynamic stability and optimal renal function maintained:   Monitor labs and assess for signs and symptoms of volume excess or deficit   Monitor intake, output and patient weight     Problem: Metabolic/Fluid and Electrolytes - Adult  Goal: Glucose maintained within prescribed range  Outcome: Progressing  Flowsheets (Taken 2/28/2023 0804)  Glucose maintained within prescribed range:   Monitor blood glucose as ordered   Assess for signs and symptoms of hyperglycemia and hypoglycemia     Problem: Respiratory - Adult  Goal: Achieves optimal ventilation and oxygenation  2/28/2023 1631 by Michael Martinez RN  Outcome: Progressing  Flowsheets (Taken 2/28/2023 0804)  Achieves optimal ventilation and oxygenation:   Assess for changes in respiratory status   Assess for changes in mentation and behavior   Position to facilitate oxygenation and minimize respiratory effort   Oxygen supplementation based on oxygen saturation or arterial blood gases

## 2023-02-28 NOTE — PROGRESS NOTES
[address]  Occupational Therapy  Daily Note  Time:   Time In: 1130  Time Out: 1200  Timed Code Treatment Minutes: 30 Minutes  Minutes: 30          Date: 2023  Patient Name: Irma Ricks,   Gender: female      Room: [facility]55/055-A  MRN: [id number]  : 1950  (81 y.o.)  Referring Practitioner: Néstor Moran MD  Diagnosis: closed intertrochanteric fracture, right  Additional Pertinent Hx: The patient was recently hospitalized from 2023 to 2023 initially for abdominal bloating, nausea and leg swelling. She was found to have elevated D-dimer. Subsequent testing revealed right lower lobe pulmonary embolism and right lower extremity DVT. She was at initially treated with IV heparin and later transition to [address] [place] [address]. The patient was brought to ER on 2022 because of progressively increased leg swelling, and nausea/vomiting associated with decreased oral intake. She was found to have BNP of 256.7 and Bumex was prescribed. She then had a fall accident  when she was going to toilet while she was in ER on 2022. The fall resulted severe right hip pain. X-ray of right hip and pelvis revealed acute right femoral intertrochanteric fracture with subtrochanteric extension. Orthopedic service was consulted. Xarelto was held in preparation for surgical management. Cardiology was consulted on 2023 for elevated BNP and Lasix was started. Because of difficulty voiding with urinary retention, urology was also consulted on 2023. Urology service placed Wen with some difficulty on 2023. Therefore Wen was kept in place. On 2/3/2023 the patient underwent open treatment of right intertrochanteric fracture with cephalomedullary nail by Dr. Garrett Sanchez. She is allowed weightbearing as tolerated at the right lower extremity postoperatively.     Restrictions/Precautions:  Restrictions/Precautions: General Precautions, Fall Risk, Weight Bearing  Right Lower Extremity Weight Bearing: Weight Bearing As Tolerated  Position Activity Restriction  Other position/activity restrictions: Tracheostomy/collar 6L. With venturi mask for out of room use 8 liters portable O2. SUBJECTIVE: Pt seated in w/c upon arrival, agreeable to continuance of OT session with this HINSON. PAIN: 9/10: R Hip at EOS    Vitals: Oxygen & Heart Rate: WNL throughout session- pt on trach mask via 8L during session. COGNITION: Slow Processing, Decreased Recall, Decreased Insight, Decreased Problem Solving, Decreased Safety Awareness, Impaired Attention, Tangential, and pt is self-limiting throughout session. ADL:   No ADL's completed this session. Darius Gordon BALANCE:  Sitting Balance:  Modified Independent. Within w/c. Standing Balance: Contact Guard Assistance. Longest stand x20 secs within IADL task. BED MOBILITY:  Scooting: Minimal Assistance, X 1 Bedside chair- pt required encouragement to trial on own prior to requesting assistance    TRANSFERS:  Sit to Stand:  Contact Guard Assistance. Stand to Sit: Contact Guard Assistance. Comment: To/from various surfaces    FUNCTIONAL MOBILITY:  Assistive Device: Rolling Walker   Assist Level:  Contact Guard Assistance. Distance:   Completed functional mobility at short distance x2 trials at slow pace, no LOB noted. Pt requires max cues for thoracic extension- lengthy seated rest break after trial of mobility, mod fatigue noted. ADDITIONAL ACTIVITIES:  Continuance of BUE HEP exercises x10 reps x1 sets using mod resistance band in all joints/planes to increase strength and endurance required for ADLs. Pt required mod rest break between each exercise and min v/c for proper technique. Pt participated in IADL task to ambulate within therapy kitchen at very short distance with use of RW and continued to stand for dusting task- pt initiated seated rest break ~10 after beginning task.  Pt completed additional trial and tasked to challenge self x20 secs standing for task- pt began to sit at x15 secs, however, able to tolerate x20 secs total with further encouragement. Pt required CGA for balance and max cues for sustained postural alignment while reaching outside of [place]. Completed to increase kitchen safety and facilitate functional reaching required for simple meal prep tasks. ASSESSMENT:  Assessment: Jazlyn Zambrano is making slow steady progress towards therapy goals during stay on IPR thus far, meeting 2/5 STGs. She has made progression in UB dressing with ability to complete with set up while seated upright. She continues to require maxA for LB dressing/bathing tasks. Jazlyn Zambrano continues to be dep for footwear mgmt. Jazlyn Zambrano currently requires Mod A for toileting hygiene, requiring multiple standing trials d/t fatigue and A with hygiene and has demoed improvements in indep with clothing mgmt. Jazlyn Zambrano has progressed to primary use of stand pivot t/fs and short distance functional mobility with Min A x1 and increased time. Pt continues to be highly limited by decreased endurance and requiring frequent rest breaks. She continues to exhibit decreased strength, activity tolerance, increase of pain in RLE with movement, increased need for assist during BADL routine which limits her from meeting her goals. Jazlyn Zambrano is typically independent with BADL routine with SBA from  for safety measures. She is currently using 6L via trach mask. Pt continues to require skilled OT intervention to improve strength, activity tolerance, safety awareness, ADL/IADL performance required for pt to return home at [facility]. Without skilled services patient is at risk for falls, decrease in overall function and increased caregiver burden. Activity Tolerance:  Patient tolerance of  treatment: Fair treatment tolerance      Discharge Recommendations: Home with Home Health OT and Patient would benefit from continued OT at discharge  Equipment Recommendations:  Other: Monitor pending progress  Plan: Times Per Week: 5x per week for 90 minutes  Times Per Day: Once a day  Current Treatment Recommendations: Balance training, Functional mobility training, Endurance training, Self-Care / ADL, Safety education & training, Patient/Caregiver education & training, Strengthening    Patient Education  Patient Education: IADL's, Energy Conservation, Home Exercise Program, Importance of Increasing Activity, Assistive Device Safety, and Safety with transfers and mobility. Goals  Short Term Goals  Time Frame for Short Term Goals: until discharge  Short Term Goal 1: Pt will tolerate 12-15 min EOB ADL task with S to improve trunk control and activity tolerance required for EOB ADLs. Short Term Goal 2: Pt will complete sit-stand t/fs with CGA x 1 with minimal cues of technique to progress towards BSC t/fs  Short Term Goal 3: Pt will tolerate standing 3 min with CGA for increased ease of clothing management nad LB bathing routine  Short Term Goal 4: Pt will safely navigate short distances with no > than CGA x1 and min VC for safety to increase indep with ADLs  Long Term Goals  Time Frame for Long Term Goals : 2 weeks from IPR OT eval  Long Term Goal 1: Pt will complete sit-stand t/fs with SBA x 1 with minimal cues of technique to progress towards BSC t/fs  Long Term Goal 2: Pt will perform UB/LB dressing and bathing with Min A and minimal cues for ECT to improve functional independence. Following session, patient left in safe position with all fall risk precautions in place.

## 2023-02-28 NOTE — PROGRESS NOTES
Mark Cortes called back and let SW know that patients spouse calls her asking a question or two and gets off the phone. Mark Cortes wanted to know how the appeal process was going; SERVANDO informed her it is still under review. Had message from Mark Cortes from [facility]; when SERVANDO called back there was no answer so message with call back number were left.

## 2023-02-28 NOTE — PLAN OF CARE
Problem: Skin/Tissue Integrity  Goal: Absence of new skin breakdown  Description: 1. Monitor for areas of redness and/or skin breakdown  2. Assess vascular access sites hourly  3. Every 4-6 hours minimum:  Change oxygen saturation probe site  4. Every 4-6 hours:  If on nasal continuous positive airway pressure, respiratory therapy assess nares and determine need for appliance change or resting period.   Outcome: Progressing     Problem: Safety - Adult  Goal: Free from fall injury  Outcome: Progressing     Problem: Pain  Goal: Verbalizes/displays adequate comfort level or baseline comfort level  Outcome: Progressing     Problem: Respiratory - Adult  Goal: Achieves optimal ventilation and oxygenation  Outcome: Progressing  Flowsheets (Taken 2/27/2023 2047)  Achieves optimal ventilation and oxygenation:   Assess for changes in respiratory status   Assess for changes in mentation and behavior   Position to facilitate oxygenation and minimize respiratory effort   Assess the need for suctioning and aspirate as needed   Encourage broncho-pulmonary hygiene including cough, deep breathe, incentive spirometry   Assess and instruct to report shortness of breath or any respiratory difficulty   Respiratory therapy support as indicated

## 2023-02-28 NOTE — PROGRESS NOTES
Physical Medicine & Rehabilitation Progress Note    Chief Complaint:  Right hip fracture, low back compression fracture    Subjective:    Deven Alba is a 81 y.o. 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 The patient previously was admitted to inpatient rehab service from 2022 to 2022 due to critical care myopathy and debility/physical decondition as result of COVID-19 pneumonia. She was discharged to home with referral for home care service on 2022. The patient developed progressive difficulty breathing in 2022 and was found to have glottic stenosis and eventually received tracheostomy by Dr. Chet Anaya on 2022. The patient's  says the patient also developed progressively worsening lower back pain in summer 2022 and saw orthopedics surgeon at [facility]. The patient has been says the patient was diagnosed of having \"lumbar spine stress fracture\". No surgical intervention was performed. She was treated with brace which she later abandoned due to discomfort associate with brace usage.      The patient was recently hospitalized from 2023 to 2023 initially for abdominal bloating, nausea and leg swelling.  She was found to have elevated D-dimer.  Subsequent testing revealed right lower lobe pulmonary embolism and right lower extremity DVT.  She was at initially treated with IV heparin and later transition to Xarelto.     The patient was brought to ER on 2/1/2022 because of progressively increased leg swelling, and nausea/vomiting associated with decreased oral intake.  She was found to have BNP of 256.7 and Bumex was prescribed.  She then had a fall accident  when she was going to toilet while she was in ER on 2/1/2022.  The fall resulted severe right hip pain.  X-ray of right hip and pelvis revealed acute right femoral intertrochanteric fracture with subtrochanteric extension.  Orthopedic service was consulted.  Xarelto was held in preparation for surgical management.  Cardiology was consulted on 2/2/2023 for elevated BNP and Lasix was started.  Because of difficulty voiding with urinary retention, urology was also consulted on 2/2/2023.  Urology service placed Wen with some difficulty on 2/2/2023.  Therefore Wen was kept in place.  On 2/3/2023 the patient underwent open treatment of right intertrochanteric fracture with cephalomedullary nail by Dr. Vin Aragon.  She is allowed weightbearing as tolerated at the right lower extremity postoperatively.  PM&R was consulted on 2/5/2023.  During the evaluation the patient's  said that the patient began having progressively worsened low back pain starting in summer 2022 began interfering the patient's daily function.  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  Imaging tests were done and the patient was told that she had \" stress fracture\" in her spine.  She was provided with a lumbar spine brace to wear but she was not compliant with spine brace application because of discomfort it produced while she was wearing it.  Because no outside lumbar spine images study report was available for review, x-ray of  lumbar spine was ordered and performed on 2/6/2023 and showed moderate vertebral body spondylosis in the lumbar and lower thoracic spine, moderate degenerative facet arthropathy involving at least lower 3 lumbar levels, lumbar spine osteoporosis, mild superior endplate depression fracture at L2, L3 and L4. Lumbar spine MRI was recommended by radiologist.  Therefore primary team ordered lumbar spine MRI which was performed this afternoon revealing acute fracture with a fluid cleft associated with endplate at H8-S2 levels with mild height loss at each vertebral bodies. The patient was found to have severe anemia with Hgb/Hct dropped from 7.3/24 on 2/4/2023 down to 5.1/16.9 on 2/6/2023. Therefore she was given blood transfusion for few units. CT of abdomen and pelvis was ordered to rule out possible intra-abdominal source of blood loss. CT abdomen and pelvis done on 2/8/2023 show only pericolonic inflammation with diverticula related to acute diverticulitis, acute on chronic compression fracture of L2, L3, L4 and L5, mild, and marked osteopenia. The patient's hospital course also complicated by intermittent confusion, and development of gluteus region skin breakdown. The patient continues to complain abdominal gassy discomfort with frequent belching causing nausea sensation associated with poor appetite. Therefore GI was consulted today. GI Dr. Abena Davis saw the patient and start patient on Carafate and 1 dose of neostigmine. Dr. Abena Davis also order a KUB abdominal x-ray to be done tomorrow. The patient says she had no appetite due to excessive belching and nauseous feeling not relieved with Phenergan. She says she had an episode of throbbing discomfort pain at near the right knee cap area today but denies having the pain now. She says her right lower extremity is getting stronger. She is still on Wen cath. She continues tolerating the intensive rehab therapy treatment well.     The appeal to insurance for continuing stay in acute rehab filed on 2/26/2023 was denied.  is still in the process of finding an acceptable SNF for patient to go to. Rehabilitation:  PT: Reviewed. Bed Mobility:  Rolling to Left: Modified Independent, with rail   Rolling to Right: Modified Independent, with rail   Supine to Sit: Modified Independent, with rail, coming up from LT side. Sit to Supine: Minimal Assistance, lifting RLE onto the bed, last 2\". Once supine, cues to align LEs      Transfers:  Sit to Stand: Contact Guard Assistance  Stand to [facility] 68 x3 trials performed during session. To/From Bed and Chair: [address], with RW, 4 ft. Wheelchair Mobility:  Modified Independent  Extremities Used: Bilateral Upper Extremities  Type of Chair:Manual  Surface: Level Tile  Distance: 150 ft  Quality: Slow Velocity and Short Strokes      Balance:  Standing Balance: Contact Guard Assistance, with RW support during pericare and clothing management. OT: Reviewed. ADL:  EATING:Independent. Grace Dasilva CARE Score: 6. ORAL HYGIENE:Setup or clean-up assistance. seated. CARE Score: 5.     TOILETING HYGIENE:Partial/moderate assistance. Mod A clothing management and for flaquito care with increased cues to engage. Grace Dasilva CARE Score: 3. SHOWERING/BATHING:Partial/moderate assistance. Pt. required assistance with under belly folds, distal BLE, bottom. Pt. instructed to engage in task to best of ability. Grace Dasilva CARE Score: 3.     UPPER BODY DRESSING:Setup or clean-up assistance. to don pull over dress. CARE Score: 5. LOWER BODY DRESSING:Partial/moderate assistance. Overall Mod A. CARE Score: 3. FOOTWEAR:Partial/moderate assistance  Mod A. CARE Score: 3.     TOILET TRANSFER: Supervision or touching assistance. close SBA to CGA to/from [facility]. CARE Score: 4. BALANCE:  Sitting Balance:  Modified Independent. Standing Balance: Stand By Assistance, [address]  varied TRANSFERS:  Sit <-> Stand:  Close Stand By Assistance, [employer]. FUNCTIONAL MOBILITY:  Assistive Device: Rolling Walker  Assist Level: Close SBA- CGA . Distance: within room     ADDITIONAL ACTIVITIES:  BIMs assessment and CAM completed with pt this date. Pt. Instructed to engage in IADL task of locating items to prepare an egg and completed at seated level and able to complete with SBA when reaching OBOS and able to complete task at seated level, cues required for w/c placement to improve overall safety in the kitchen. Pt. Instructed to complete BUE HEP with use of teachback method, pt able to tolerate 15 reps in all joints/planes with 80% accuracy. Task performed to further advance I with ADL task completion. ST: Reviewed. Novel Card Game - [address] [place] [facility]  Patient with overall good execution of game this date. Patient with decreased visual scanning and attention to detail throughout game; improved success given min cues. Complex Money Management - Making Change  12/15 independent, 3/15 given min cues     *patient with good math computations via pen/paper; required min cuing to check amounts with independent self correction after cuing  *overall good success with task this date    Recall of Novel Card Game - [address] in [facility]  (Name, setup, how to play, how to win)  Delayed (~1 day): 4/4 independent       Review of Systems:  Review of Systems   Constitutional:  Negative for chills, diaphoresis, fatigue and fever. HENT:  Negative for hearing loss, rhinorrhea, sneezing, sore throat and trouble swallowing. Eyes:  Negative for visual disturbance. Respiratory:  Negative for cough, shortness of breath and wheezing. Cardiovascular:  Negative for chest pain, palpitations and leg swelling. Gastrointestinal:  Positive for abdominal distention and nausea. Negative for abdominal pain, constipation, diarrhea and vomiting. Genitourinary:  Negative for dysuria. Musculoskeletal:  Positive for arthralgias (Bilateral hip, right groin, and right thigh), back pain (Right-sided lower back), gait problem and myalgias (Right thigh near knee area). Negative for neck pain. Skin:  Negative for rash. Neurological:  Positive for weakness (Right lower extremity) and numbness (Bilateral feet). Negative for dizziness, tremors, seizures, speech difficulty, light-headedness and headaches. Psychiatric/Behavioral:  Negative for dysphoric mood, hallucinations and sleep disturbance. The patient is not nervous/anxious.          Objective:  /80   Pulse 99   Temp 98.1 °F (36.7 °C) (Oral)   Resp 18   Ht 5' 2\" (1.575 m)   Wt 206 lb 9 oz (93.7 kg)   LMP  (LMP Unknown)   SpO2 100%   BMI 37.78 kg/m²   Physical Exam   General:  well-developed, well nourished morbid obese  female ; in no acute distress ; appropriate affect & mood; lying on bed comfortably; receiving humidified oxygen supplement via trach collar; speaking in whispered manner due to presence of tracheostomy  Eyes: pupil equally round ; extra-ocular motion intact bilaterally  Head, Ear, Nose, Mouth & Throat : normocephalic ; no discharge from ears or nose ; no deformity ; no facial swelling ; oral mucosa pink  Neck :  supple ; presence of tracheostomy at anterior neck; no tenderness; mild muscle spasm at upper trapezius muscle  Cardiovascular : regular rate & rhythm ; normal S1 & S2 heart sound ; no murmur ; normal peripheral pulse at bilateral upper & lower extremities  Pulmonary : Breath sounds present at bilateral lung fields; no wheezing ; no rale; no crackle  Gastrointestinal : soft, protruded abdomen; no tenderness; normal but slightly reduced bowel sound present    : Wen catheter in place draining yellowish urine  Back : no tenderness ; no muscle spasm  Skin: no skin lesion or rash ; no pitting edema at bilateral upper extremities; 1+  pitting edema at bilateral legs; presence of healed surgical scar at right upper lateral thigh and hip  Musculoskeletal : no limb asymmetry; no limb deformity; no tenderness at right hip or right thigh with palpation; no tenderness at bilateral upper extremities & the rest of bilateral lower extremities; no palpable mass at limbs ; right hip and right knee joint laxity not assessed; no joints laxity or crepitation at other limb joints; shoulder flexion and abduction passive ROM reaching 160 degrees bilaterally; right hip flexion passive ROM reaching 80 degrees and right knee flexion passive ROM reaching 90 degrees ; left hip flexion passive ROM reaching 90 degrees; ankle dorsiflexion passive ROM reaching 0 degrees bilaterally; otherwise normal functional joints ROM at the rest of bilateral upper & lower extremities  Cerebral :  alert ; awake ; oriented to place, person and time; follow 1-2 steps verbal command  Cerebellum : no dysmetria with bilateral finger-to-nose test ; unable to perform heel-to-shin test on the right side; dysmetria with left heel-to-shin test  Cranial nerve : CN II to XII function grossly intact  Sensory : intact light touch and pin prick sensation at bilateral upper extremities; reduced light touch and pinprick sensation at distal bilateral lower extremities from upper leg region downward in sock distribution  Motor : normal tone at bilateral upper & left lower extremities ; muscle tone not assessed on right lower extremity due to the pain; 2+/5 to 3-/5 muscle strength at right hip flexion; 2+/5 muscle strength at right hip abduction and adduction; 4+/5 to 5/5 muscle strength at the left hip flexion; 4+/5 muscle strength at the right knee flexion; otherwise 5/5 muscle strength at the rest of bilateral upper and the lower extremities  Reflex : 1+ bilateral brachioradialis reflexes; 0 bilateral biceps and bilateral knees reflexes   Pathological Reflex :  No Roberta's sign ; no ankle clonus  Gait : Not assessed      Diagnostics:   Recent Results (from the past 24 hour(s))   POCT glucose    Collection Time: 03/01/23  3:51 AM   Result Value Ref Range    POC Glucose 95 70 - 108 mg/dl   Basic Metabolic Panel    Collection Time: 03/01/23  4:30 AM   Result Value Ref Range    Sodium 138 135 - 145 meq/L    Potassium 3.5 3.5 - 5.2 meq/L    Chloride 99 98 - 111 meq/L    CO2 26 23 - 33 meq/L    Glucose 80 70 - 108 mg/dL    BUN 16 7 - 22 mg/dL    Creatinine 0.7 0.4 - 1.2 mg/dL    Calcium 8.5 8.5 - 10.5 mg/dL   Calcium, Ionized    Collection Time: 03/01/23  4:30 AM   Result Value Ref Range    Calcium, Ionized 1.14 1.12 - 1.32 mmol/L   Anion Gap    Collection Time: 03/01/23  4:30 AM   Result Value Ref Range    Anion Gap 13.0 8.0 - 16.0 meq/L   Glomerular Filtration Rate, Estimated    Collection Time: 03/01/23  4:30 AM   Result Value Ref Range    Est, Glom Filt Rate >60 >60 ml/min/1.73m2   CBC with Auto Differential    Collection Time: 03/01/23  7:30 AM   Result Value Ref Range    WBC 8.7 4.8 - 10.8 thou/mm3    RBC 3.18 (L) 4.20 - 5.40 mill/mm3    Hemoglobin 9.0 (L) 12.0 - 16.0 gm/dl    Hematocrit 28.9 (L) 37.0 - 47.0 %    MCV 90.9 81.0 - 99.0 fL    MCH 28.3 26.0 - 33.0 pg    MCHC 31.1 (L) 32.2 - 35.5 gm/dl    RDW-CV 17.2 (H) 11.5 - 14.5 %    RDW-SD 57.0 (H) 35.0 - 45.0 fL    Platelets 133 952 - 400 thou/mm3    MPV 9.6 9.4 - 12.4 fL    Seg Neutrophils 68.6 %    Lymphocytes 18.5 %    Monocytes 10.4 %    Eosinophils 1.5 %    Basophils 0.5 %    Immature Granulocytes 0.5 %    Segs Absolute 6.0 1.8 - 7.7 thou/mm3    Lymphocytes Absolute 1.6 1.0 - 4.8 thou/mm3    Monocytes Absolute 0.9 0.4 - 1.3 thou/mm3    Eosinophils Absolute 0.1 0.0 - 0.4 thou/mm3    Basophils Absolute 0.0 0.0 - 0.1 thou/mm3    Immature Grans (Abs) 0.04 0.00 - 0.07 thou/mm3    nRBC 0 /100 wbc   POCT Glucose    Collection Time: 03/01/23  4:38 PM   Result Value Ref Range    POC Glucose 104 70 - 108 mg/dl       Impression:  Right femoral intertrochanteric fracture with subtrochanteric extension due to fall requiring open reduction and internal fixation with cephalomedullary nail  Glottic stenosis requiring tracheostomy  Persistent chronic low back pain due to lumbar and lower thoracic spine spondylosis with degenerative facet arthropathy at the lower 3 lumbar levels, and acute L2-S1 endplate fractures  Right posterior tibial, left greater saphenous, left peroneal and left anterior tibial veins deep vein thrombosis, and right lower lobe pulmonary embolism  Decreased appetite, nausea and belching   Acute anemia requiring blood transfusion  Urinary retention due to urethra obstruction requiring Wen cath placement   Candida albicans UTI (treated)  Nausea of uncertain cause  Lumbar spine osteoporosis  Gluteal region decubitus ulcer  Diabetes mellitus type 2 with poor blood glucose control and complicated by bilateral lower extremities diabetic polyneuropathy  Morbid obesity  Hypertension  Hyperlipidemia  History of lower back pain with history of \"lumbar stress fracture\"  History of COVID infection with pneumonia  History of mild cognitive impairment  History of coronary artery disease requiring coronary artery stenting  Grade 1 left ventricular diastolic dysfunction with preserved ejection fraction     The patient's vital sign remains stable. The patient continues to complain poor appetite, nausea and frequent belching. GI has been consulted and start patient on Carafate. 1 dose neostigmine is also prescribed by Dr. Pipe Navarro. The patient still has right lower extremity weakness and pain but the muscle strength is improving. He continue tolerating the current rehab therapy treatment well. Her function continues to improve very slowly but steadily. She needs longer term of daily rehabilitation treatment intervention to further improve her function. SNF subacute rehab program placement as discharge plan continues to be recommended. The patient's appeal to insurance for further stay has been denied.   Insurance was no longer pay for further stay starting tomorrow.      Plan:  Continues intensive PT/OT/SLP/RT inpatient rehabilitation program at least 3 hours per day, 5 days per week in order to improve functional status prior to discharge.  Family education and training will be completed.  Equipment evaluations and recommendations will be completed as appropriate.       Rehabilitation nursing continues to be involved for bowel, bladder, skin, and pain management.  Nursing will also provide education and training to patient and family.    Prophylaxis:  DVT: Patient on Xarelto, KENA stocking, intermittent pneumatic compression device.  GI: Colace, Enemeez enema, GlycoLax, Senokot, Movantik, lactulose as needed, milk of magnesium as needed, lactobacillus daily, Imodium as needed for diarrhea, simethicone as needed, Maalox as needed  Pain: Tylenol, Voltaren gel as needed, tramadol as needed  X-ray of abdomen (KUB) tomorrow ordered by Dr. Rhodes  Continue Carafate for abdominal distention  Continue aspirin for coronary artery disease  Continue Lasix for lower extremities edema  Continue Lantus insulin, Humalog insulin, Humalog insulin coverage for diabetes mellitus  Continue Seroquel for anxiety and insomnia   Continue Xarelto for bilateral DVT and pulmonary embolism  Continue Toprol-XL for hypertension  Continue Crestor for hyperlipidemia  Continue melatonin, trazodone as needed for insomnia  Continue Protonix for gastric protection  Continue simethicone as needed for indigestion  Continue multivitamin and ferrous sulfate for anemia  Continues Megace to stimulate her appetite  Nutrition: Continue current diet  Bladder: Monitoring signs or symptoms of UTI  Bowel: Monitoring signs or symptoms of constipation   and case management for coordination of care and discharge planning      Missed Therapy Time:  None      CYRUS CHADWICK MD

## 2023-02-28 NOTE — PROGRESS NOTES
[facility]  Inpatient Rehabilitation  Occupational Therapy  Progress Note  Time:  Time In: 1030  Time Out: 1130  Timed Code Treatment Minutes: 60 Minutes  Minutes: 60          Date: 2023  Patient Name: Gweneth Boas,   Gender: female      Room: [facility]/055-A  MRN: [id number]  : 1950  (81 y.o.)  Referring Practitioner: Catrachita Vivas MD  Diagnosis: closed intertrochanteric fracture, right  Additional Pertinent Hx: The patient was recently hospitalized from 2023 to 2023 initially for abdominal bloating, nausea and leg swelling. She was found to have elevated D-dimer. Subsequent testing revealed right lower lobe pulmonary embolism and right lower extremity DVT. She was at initially treated with IV heparin and later transition to [address] [place] [address]. The patient was brought to ER on 2022 because of progressively increased leg swelling, and nausea/vomiting associated with decreased oral intake. She was found to have BNP of 256.7 and Bumex was prescribed. She then had a fall accident  when she was going to toilet while she was in ER on 2022. The fall resulted severe right hip pain. X-ray of right hip and pelvis revealed acute right femoral intertrochanteric fracture with subtrochanteric extension. Orthopedic service was consulted. Xarelto was held in preparation for surgical management. Cardiology was consulted on 2023 for elevated BNP and Lasix was started. Because of difficulty voiding with urinary retention, urology was also consulted on 2023. Urology service placed Wen with some difficulty on 2023. Therefore Wen was kept in place. On 2/3/2023 the patient underwent open treatment of right intertrochanteric fracture with cephalomedullary nail by Dr. Mellissa Arboleda. She is allowed weightbearing as tolerated at the right lower extremity postoperatively.     Restrictions/Precautions:  Restrictions/Precautions: General Precautions, Fall Risk, Weight Bearing  Right Lower Extremity Weight Bearing: Weight Bearing As Tolerated  Position Activity Restriction  Other position/activity restrictions: Tracheostomy/collar 6L. With venturi mask for out of room use 8 liters portable O2. SUBJECTIVE: Pt was up in recliner upon arrival, pleasant and agreeable to OT. PAIN: 6/10: R hip    Vitals: Vitals not assessed per clinical judgement, see nursing flowsheet    COGNITION: Decreased Insight, Decreased Problem Solving, Decreased Safety Awareness, and Tangential    ADL:   Grooming: Contact Guard Assistance. Standing sinkside for hand hygiene, forward flexed posture with decreased standing tolerance  Footwear Management: Dependent. Donning slipper socks  Toileting: Moderate Assistance. Pt able to complete clothing mgmt in standing - forward flexed posture with CGA - Min A for balance. A required for hygiene after BM  Toilet Transfer: Minimal Assistance. To/from [facility] . BALANCE:  Sitting Balance:  Supervision. Standing Balance: Contact Guard Assistance, Minimal Assistance. With 1-2 UE release. Pt relying on forearms with fatigue. BED MOBILITY:  Not Tested    TRANSFERS:  Sit to Stand:  Minimal Assistance. From various surfaces, VC for technique  Stand to Sit: Minimal Assistance. To various surfaces, VC for technique    FUNCTIONAL MOBILITY:  Assistive Device: Rolling Walker  Assist Level:  Minimal Assistance. Distance:  3' recliner to [facility], 2' BSC to w/c, 3' w/c to sink  Slow pace, forward flexed posture, increased time to advance BLE. ADDITIONAL ACTIVITIES:  Patient completed BUE strengthening exercises with skilled education on HEP: completed x10 reps x1 set with a mod in all joints and all planes in order to improve UE strength and activity tolerance required for BADL routine and toilet / shower transfers. Patient tolerated fair, requiring moderate rest breaks. Patient also required min cues for technique.  *Did not finish set of exercises at this time, pt resumed in session following this session with HINSON. ASSESSMENT:  Activity Tolerance:  Patient tolerance of  treatment: Fair treatment tolerance- required encouragement for engagement in session. Limited by fatigue and decreased endurance. Assessment: Assessment: Alicia Coelho is making slow steady progress towards therapy goals during stay on IPR thus far, meeting 2/5 STGs. She has made progression in UB dressing with ability to complete with set up while seated upright. She continues to require maxA for LB dressing/bathing tasks. Alicia Coelho continues to be dep for footwear mgmt. Alicia Coelho currently requires Mod A for toileting hygiene, requiring multiple standing trials d/t fatigue and A with hygiene and has demoed improvements in indep with clothing mgmt. Alicia Coelho has progressed to primary use of stand pivot t/fs and short distance functional mobility with Min A x1 and increased time. Pt continues to be highly limited by decreased endurance and requiring frequent rest breaks. She continues to exhibit decreased strength, activity tolerance, increase of pain in RLE with movement, increased need for assist during BADL routine which limits her from meeting her goals. Alicia Coelho is typically independent with BADL routine with SBA from  for safety measures. She is currently using 6L via trach mask. Pt continues to require skilled OT intervention to improve strength, activity tolerance, safety awareness, ADL/IADL performance required for pt to return home at [facility]. Without skilled services patient is at risk for falls, decrease in overall function and increased caregiver burden. Discharge Recommendations: Patient would benefit from continued therapy after discharge  Equipment Recommendations: Other: Monitor pending progress  Plan: Times Per Week: 5x per week for 90 minutes  Times Per Day:  Once a day  Current Treatment Recommendations: Balance training, Functional mobility training, Endurance training, Self-Care / ADL, Safety education & training, Patient/Caregiver education & training, Strengthening    Patient Education  Patient Education: Role of OT, Plan of Care, ADL's, Energy Conservation, and Reviewed Prior Education    Goals  Short Term Goals  Time Frame for Short Term Goals: until discharge  Short Term Goal 1: Pt will tolerate 12-15 min EOB ADL task with S to improve trunk control and activity tolerance required for EOB ADLs. GOAL NOT MET, CONTINUE  Short Term Goal 2: Pt will complete sit-stand t/fs with Min A x 1 with minimal cues of technique to progress towards BSC t/fs GOAL MET, REVISE  Short Term Goal 3: Pt will tolerate standing 3 min with CGA for increased ease of clothing management nad LB bathing routine GOAL NOT MET, CONTINUE  Short Term Goal 4: Pt will safely navigate short distances with no > than Min A x1 and min VC for safety to increase indep with ADLs GOAL MET, REVISE  Long Term Goals  Time Frame for Long Term Goals : 2 weeks from IPR OT eval  Long Term Goal 1: Pt will complete sit-stand t/fs with SBA x 1 with minimal cues of technique to progress towards BSC t/fs GOAL NOT MET, CONTINUE  Long Term Goal 2: Pt will perform UB/LB dressing and bathing with Min A and minimal cues for ECT to improve functional independence. GOAL NOT MET, CONTINUE    Revised Short-Term Goals  Short Term Goals  Time Frame for Short Term Goals: until discharge  Short Term Goal 1: Pt will tolerate 12-15 min EOB ADL task with S to improve trunk control and activity tolerance required for EOB ADLs.  Short Term Goal 2: Pt will complete sit-stand t/fs with CGA x 1 with minimal cues of technique to progress towards BSC t/fs  Short Term Goal 3: Pt will tolerate standing 3 min with CGA for increased ease of clothing management nad LB bathing routine  Short Term Goal 4: Pt will safely navigate short distances with no > than CGA x1 and min VC for safety to increase indep with ADLs  Long Term Goals  Time Frame for Long Term Goals : 2 weeks from  IPR OT eval  Long Term Goal 1: Pt will complete sit-stand t/fs with SBA x 1 with minimal cues of technique to progress towards BSC t/fs  Long Term Goal 2: Pt will perform UB/LB dressing and bathing with Min A and minimal cues for ECT to improve functional independence. Following session, patient left in safe position with all fall risk precautions in place.

## 2023-03-01 LAB
ANION GAP SERPL CALC-SCNC: 13 MEQ/L (ref 8–16)
BASOPHILS ABSOLUTE: 0 THOU/MM3 (ref 0–0.1)
BASOPHILS NFR BLD AUTO: 0.5 %
BUN SERPL-MCNC: 16 MG/DL (ref 7–22)
CA-I BLD ISE-SCNC: 1.14 MMOL/L (ref 1.12–1.32)
CALCIUM SERPL-MCNC: 8.5 MG/DL (ref 8.5–10.5)
CHLORIDE SERPL-SCNC: 99 MEQ/L (ref 98–111)
CO2 SERPL-SCNC: 26 MEQ/L (ref 23–33)
CREAT SERPL-MCNC: 0.7 MG/DL (ref 0.4–1.2)
DEPRECATED RDW RBC AUTO: 57 FL (ref 35–45)
EOSINOPHIL NFR BLD AUTO: 1.5 %
EOSINOPHILS ABSOLUTE: 0.1 THOU/MM3 (ref 0–0.4)
ERYTHROCYTE [DISTWIDTH] IN BLOOD BY AUTOMATED COUNT: 17.2 % (ref 11.5–14.5)
GFR SERPL CREATININE-BSD FRML MDRD: > 60 ML/MIN/1.73M2
GLUCOSE BLD STRIP.AUTO-MCNC: 104 MG/DL (ref 70–108)
GLUCOSE BLD STRIP.AUTO-MCNC: 95 MG/DL (ref 70–108)
GLUCOSE SERPL-MCNC: 80 MG/DL (ref 70–108)
HCT VFR BLD AUTO: 28.9 % (ref 37–47)
HGB BLD-MCNC: 9 GM/DL (ref 12–16)
IMM GRANULOCYTES # BLD AUTO: 0.04 THOU/MM3 (ref 0–0.07)
IMM GRANULOCYTES NFR BLD AUTO: 0.5 %
LYMPHOCYTES ABSOLUTE: 1.6 THOU/MM3 (ref 1–4.8)
LYMPHOCYTES NFR BLD AUTO: 18.5 %
MCH RBC QN AUTO: 28.3 PG (ref 26–33)
MCHC RBC AUTO-ENTMCNC: 31.1 GM/DL (ref 32.2–35.5)
MCV RBC AUTO: 90.9 FL (ref 81–99)
MONOCYTES ABSOLUTE: 0.9 THOU/MM3 (ref 0.4–1.3)
MONOCYTES NFR BLD AUTO: 10.4 %
NEUTROPHILS NFR BLD AUTO: 68.6 %
NRBC BLD AUTO-RTO: 0 /100 WBC
PLATELET # BLD AUTO: 357 THOU/MM3 (ref 130–400)
PMV BLD AUTO: 9.6 FL (ref 9.4–12.4)
POTASSIUM SERPL-SCNC: 3.5 MEQ/L (ref 3.5–5.2)
RBC # BLD AUTO: 3.18 MILL/MM3 (ref 4.2–5.4)
SEGMENTED NEUTROPHILS ABSOLUTE COUNT: 6 THOU/MM3 (ref 1.8–7.7)
SODIUM SERPL-SCNC: 138 MEQ/L (ref 135–145)
WBC # BLD AUTO: 8.7 THOU/MM3 (ref 4.8–10.8)

## 2023-03-01 PROCEDURE — 97542 WHEELCHAIR MNGMENT TRAINING: CPT

## 2023-03-01 PROCEDURE — 80048 BASIC METABOLIC PNL TOTAL CA: CPT

## 2023-03-01 PROCEDURE — 2580000003 HC RX 258: Performed by: PHYSICAL MEDICINE & REHABILITATION

## 2023-03-01 PROCEDURE — 2700000000 HC OXYGEN THERAPY PER DAY

## 2023-03-01 PROCEDURE — 6370000000 HC RX 637 (ALT 250 FOR IP): Performed by: FAMILY MEDICINE

## 2023-03-01 PROCEDURE — 97110 THERAPEUTIC EXERCISES: CPT

## 2023-03-01 PROCEDURE — 1180000000 HC REHAB R&B

## 2023-03-01 PROCEDURE — 97530 THERAPEUTIC ACTIVITIES: CPT

## 2023-03-01 PROCEDURE — 97130 THER IVNTJ EA ADDL 15 MIN: CPT

## 2023-03-01 PROCEDURE — 99232 SBSQ HOSP IP/OBS MODERATE 35: CPT | Performed by: PHYSICAL MEDICINE & REHABILITATION

## 2023-03-01 PROCEDURE — 82330 ASSAY OF CALCIUM: CPT

## 2023-03-01 PROCEDURE — 36592 COLLECT BLOOD FROM PICC: CPT

## 2023-03-01 PROCEDURE — 94761 N-INVAS EAR/PLS OXIMETRY MLT: CPT

## 2023-03-01 PROCEDURE — 6370000000 HC RX 637 (ALT 250 FOR IP): Performed by: INTERNAL MEDICINE

## 2023-03-01 PROCEDURE — 6370000000 HC RX 637 (ALT 250 FOR IP): Performed by: PHYSICAL MEDICINE & REHABILITATION

## 2023-03-01 PROCEDURE — 85025 COMPLETE CBC W/AUTO DIFF WBC: CPT

## 2023-03-01 PROCEDURE — 36415 COLL VENOUS BLD VENIPUNCTURE: CPT

## 2023-03-01 PROCEDURE — 82948 REAGENT STRIP/BLOOD GLUCOSE: CPT

## 2023-03-01 PROCEDURE — 97129 THER IVNTJ 1ST 15 MIN: CPT

## 2023-03-01 PROCEDURE — 97535 SELF CARE MNGMENT TRAINING: CPT

## 2023-03-01 RX ORDER — SUCRALFATE 1 G/1
1 TABLET ORAL
Status: DISCONTINUED | OUTPATIENT
Start: 2023-03-01 | End: 2023-03-02 | Stop reason: HOSPADM

## 2023-03-01 RX ADMIN — PANTOPRAZOLE SODIUM 40 MG: 40 TABLET, DELAYED RELEASE ORAL at 05:38

## 2023-03-01 RX ADMIN — ACETAMINOPHEN 650 MG: 325 TABLET ORAL at 17:18

## 2023-03-01 RX ADMIN — SODIUM CHLORIDE, PRESERVATIVE FREE 10 ML: 5 INJECTION INTRAVENOUS at 08:16

## 2023-03-01 RX ADMIN — Medication 6 MG: at 20:45

## 2023-03-01 RX ADMIN — ROSUVASTATIN 10 MG: 10 TABLET, FILM COATED ORAL at 20:46

## 2023-03-01 RX ADMIN — ACETAMINOPHEN 650 MG: 325 TABLET ORAL at 20:46

## 2023-03-01 RX ADMIN — DICLOFENAC SODIUM 2 G: 10 GEL TOPICAL at 20:47

## 2023-03-01 RX ADMIN — MICONAZOLE NITRATE: 20 POWDER TOPICAL at 08:16

## 2023-03-01 RX ADMIN — METOLAZONE 2.5 MG: 2.5 TABLET ORAL at 08:14

## 2023-03-01 RX ADMIN — MICONAZOLE NITRATE: 20 POWDER TOPICAL at 20:50

## 2023-03-01 RX ADMIN — MEGESTROL ACETATE 40 MG: 40 TABLET ORAL at 08:14

## 2023-03-01 RX ADMIN — DICLOFENAC SODIUM 2 G: 10 GEL TOPICAL at 08:15

## 2023-03-01 RX ADMIN — ACETAMINOPHEN 650 MG: 325 TABLET ORAL at 08:14

## 2023-03-01 RX ADMIN — TRAZODONE HYDROCHLORIDE 100 MG: 100 TABLET ORAL at 20:46

## 2023-03-01 RX ADMIN — Medication 1 TABLET: at 08:14

## 2023-03-01 RX ADMIN — DICLOFENAC SODIUM 2 G: 10 GEL TOPICAL at 13:29

## 2023-03-01 RX ADMIN — METOPROLOL SUCCINATE 25 MG: 25 TABLET, FILM COATED, EXTENDED RELEASE ORAL at 08:14

## 2023-03-01 RX ADMIN — Medication 1 CAPSULE: at 08:14

## 2023-03-01 RX ADMIN — QUETIAPINE FUMARATE 25 MG: 25 TABLET ORAL at 20:46

## 2023-03-01 RX ADMIN — SODIUM CHLORIDE, PRESERVATIVE FREE 10 ML: 5 INJECTION INTRAVENOUS at 20:44

## 2023-03-01 RX ADMIN — FUROSEMIDE 40 MG: 40 TABLET ORAL at 08:14

## 2023-03-01 RX ADMIN — RIVAROXABAN 20 MG: 20 TABLET, FILM COATED ORAL at 17:18

## 2023-03-01 RX ADMIN — SUCRALFATE 1 G: 1 TABLET ORAL at 17:19

## 2023-03-01 RX ADMIN — FOLIC ACID 1 MG: 1 TABLET ORAL at 08:14

## 2023-03-01 RX ADMIN — DICLOFENAC SODIUM 2 G: 10 GEL TOPICAL at 17:18

## 2023-03-01 RX ADMIN — INSULIN GLARGINE 20 UNITS: 100 INJECTION, SOLUTION SUBCUTANEOUS at 20:55

## 2023-03-01 RX ADMIN — SUCRALFATE 1 G: 1 TABLET ORAL at 20:50

## 2023-03-01 RX ADMIN — ASPIRIN 81 MG 81 MG: 81 TABLET ORAL at 08:14

## 2023-03-01 RX ADMIN — ACETAMINOPHEN 650 MG: 325 TABLET ORAL at 03:48

## 2023-03-01 ASSESSMENT — PAIN DESCRIPTION - LOCATION
LOCATION: OTHER (COMMENT)
LOCATION: HIP

## 2023-03-01 ASSESSMENT — PAIN DESCRIPTION - DESCRIPTORS
DESCRIPTORS: ACHING
DESCRIPTORS: ACHING

## 2023-03-01 ASSESSMENT — PAIN DESCRIPTION - PAIN TYPE
TYPE: SURGICAL PAIN
TYPE: SURGICAL PAIN

## 2023-03-01 ASSESSMENT — PAIN SCALES - GENERAL
PAINLEVEL_OUTOF10: 7
PAINLEVEL_OUTOF10: 4
PAINLEVEL_OUTOF10: 8

## 2023-03-01 ASSESSMENT — PAIN DESCRIPTION - ONSET
ONSET: ON-GOING
ONSET: ON-GOING

## 2023-03-01 ASSESSMENT — PAIN DESCRIPTION - FREQUENCY
FREQUENCY: CONTINUOUS
FREQUENCY: CONTINUOUS

## 2023-03-01 ASSESSMENT — PAIN DESCRIPTION - ORIENTATION
ORIENTATION: RIGHT
ORIENTATION: RIGHT

## 2023-03-01 ASSESSMENT — ENCOUNTER SYMPTOMS: ABDOMINAL DISTENTION: 1

## 2023-03-01 NOTE — FLOWSHEET NOTE
02/28/23 1933   Treatment Team Notification   Reason for Communication Review case  (KUB results)   Type of Critical Result Radiology   Critical Radiology Result Type X-ray  (KUB)   Team Member Name Πάνου 90 Team Role Attending Provider   Method of Communication Secure Message      Mild ileus involving both colonic and small intestinal loops. Dr. Camila Wilkes answered Perfect Serve message acknowledging that he received the notification.

## 2023-03-01 NOTE — PLAN OF CARE
Problem: Chronic Conditions and Co-morbidities  Goal: Patient's chronic conditions and co-morbidity symptoms are monitored and maintained or improved  Outcome: Progressing  Flowsheets (Taken 3/1/2023 0811)  Care Plan - Patient's Chronic Conditions and Co-Morbidity Symptoms are Monitored and Maintained or Improved:   Monitor and assess patient's chronic conditions and comorbid symptoms for stability, deterioration, or improvement   Update acute care plan with appropriate goals if chronic or comorbid symptoms are exacerbated and prevent overall improvement and discharge     Problem: Discharge Planning  Goal: Discharge to home or other facility with appropriate resources  3/1/2023 1359 by Nicolás Corona RN  Outcome: Progressing  Flowsheets (Taken 3/1/2023 3905)  Discharge to home or other facility with appropriate resources:   Identify barriers to discharge with patient and caregiver   Arrange for needed discharge resources and transportation as appropriate   Identify discharge learning needs (meds, wound care, etc)     Problem: Skin/Tissue Integrity  Goal: Absence of new skin breakdown  Description: 1. Monitor for areas of redness and/or skin breakdown  2. Assess vascular access sites hourly  3. Every 4-6 hours minimum:  Change oxygen saturation probe site  4. Every 4-6 hours:  If on nasal continuous positive airway pressure, respiratory therapy assess nares and determine need for appliance change or resting period. 3/1/2023 1359 by Nicolás Corona RN  Outcome: Progressing     Problem: Safety - Adult  Goal: Free from fall injury  3/1/2023 1359 by Nicolás Corona RN  Outcome: Progressing  Falling star prevention in place. Bed and chair alarms in use. Call light in reach. Purposeful hourly rounding.

## 2023-03-01 NOTE — PLAN OF CARE
Team conference held 3/1/2023. Recommendations of the team were explained to the patient by Dr. Isaak Angelo and Virgilio Henriquez. Patient appealed [facility] discharge; appeal was not granted. Patient has until 3/2/2023 at noon. Following discharge, team is recommending SNF. Care plan reviewed with patient. Patient verbalized understanding of the plan of care and contributed to goal setting. SW to follow and maintain involvement in discharge planning.

## 2023-03-01 NOTE — PROGRESS NOTES
[facility] PHYSICAL THERAPY  DAILY NOTE  [address] [facility],4Th Floor - 7E-55/055-A    Time In: 1330  Time Out: 1400  Timed Code Treatment Minutes: 30 Minutes  Minutes: 30          Date: 3/1/2023  Patient Name: Allie Segura,  Gender:  female        MRN: [id number]  : 1950  (81 y.o.)     Referring Practitioner: Payam Winters MD  Diagnosis: closed intertrochanteric fx, Rt  Additional Pertinent Hx: Manjeet Crystal is a 73yoF with PMH of recent PE and b/l DVT, macrocytic anemia, CAD, DM2, tracheostomy dependent, obesity, and HTN who presents for leg swelling, nausea, and constipation. She was recently discharged after an 8 day admission for DVTs and PEs. She was placed on Xarelto when discharged and stated that she has been compliant and only missed one dose. She returned due to chronic constipation, feeling ''bloated'', nausea, and LE edema. While being evaluated in the ED the pt had a mechanical fall and developed a R femur fracture. She was found to have supratheraputic INR at 3.83. CT head and C-spine both clear. Pt is s/p R femur ORIF by Dr Yue Snider . Pt found to have acute fractures of L2-S1 likely 2/2 osteoporosis, ok for activity as tolerated per Dr Chadwick Luong . Prior Level of Function:  Lives With: Spouse  Type of Home: House  Home Layout: One level  Home Access: Ramped entrance (or 1 step with no rails.)  Home Equipment: Celina Bunk, rolling, Lift chair (Pt sleeps in lift chair and was using it to assist up to stand PTA)   Bathroom Shower/Tub: Walk-in shower  Bathroom Toilet: Standard  Bathroom Equipment: Built-in shower seat, 3-in-1 commode    ADL Assistance: Needs assistance  Homemaking Assistance: Needs assistance  Ambulation Assistance: Independent  Transfer Assistance: Independent  Active : No  Additional Comments: pt amb short distances in the home with RW,  available / and able to assist as needed.  Has a passi valve that she uses at rest.    Restrictions/Precautions:  Restrictions/Precautions: General Precautions, Fall Risk, Weight Bearing  Right Lower Extremity Weight Bearing: Weight Bearing As Tolerated  Position Activity Restriction  Other position/activity restrictions: Tracheostomy/collar 6L. With venturi mask for out of room use 8 liters portable O2. SUBJECTIVE: Pt seated in recliner. Pleasant and cooperative. PAIN: 0/10:     Vitals: Vitals not assessed per clinical judgement, see nursing flowsheet    OBJECTIVE:  Bed Mobility:  Sit to Supine: Minimal Assistance, lifting RLE onto the bed, last 2\". Once supine, cues to align LEs     Transfers:  Sit to Stand: Contact Guard Assistance  Stand to [facility] 68 x3 trials performed during session. To/From Bed and Chair: [employer], with RW, 4 ft. Exercise:    Exercises were completed for increased independence with functional mobility. Supine- short arc quads x10r eps RLE, SLR with min assist x5 reps RLE, manually guided hip ABD x10 reps. Functional Outcome Measures: Not completed       ASSESSMENT:  Assessment: Patient progressing toward established goals. Activity Tolerance:  Patient tolerance of  treatment: good.       Equipment Recommendations:Equipment Needed: No  Other: Pt has RW, manual w/c and mechanical lift device  Discharge Recommendations: Continue to assess pending progress and Patient would benefit from continued PT at discharge  Plan: Current Treatment Recommendations: Strengthening, Balance training, Functional mobility training, Transfer training, Endurance training, Equipment evaluation, education, & procurement, Patient/Caregiver education & training, Safety education & training, Therapeutic activities, Home exercise program, Wheelchair mobility training, Gait training, Pain management  General Plan:  (5x/ wk 90 min)    Patient Education  Patient Education: Home Exercise Program, Transfers, Gait    Goals:  Patient Goals : get around o my own without the RW  Short Term Goals  Time Frame for Short Term Goals: 1 wk  Short Term Goal 1: Pt will go from supine <->sit, mod +1 to get in/out of bed. MET, see LTG  Short Term Goal 2: Pt will get up/down from bed, into JEANNETTE stedy device, +1 mod assist to progress to up to RW GOAL MET, SEE LTG  Short Term Goal 3: Pt will  the JEANNETTE stedy device, +1 min assist x5 min, to progress to standing with RW MET, see LTG  Short Term Goal 4: Pt will propel w/c >= 50 ft, tile surface, CGA for home mobility. GOAL MET, SEE LTG  Short Term Goal 5: Pt to transfer sit <--> stand min A for increased functional mobility. MET, see LTG  Long Term Goals  Time Frame for Long Term Goals : 3 wks from evaluation. Long Term Goal 1: Pt to go supine <->sit, min +1 to get in/out of bed, bed flat, no rail. Long Term Goal 2: Pt to get up/down from bed or w/c +1 contact guard assist, consistently, to get up to walk. Long Term Goal 3: Pt to perform stand/pivot transfer with RW, +1 contact guard assist to get in/out of w/c. Long Term Goal 4: Pt to walk with RW >= 10 ft, mn +1 to progress to home mobility  Long Term Goal 5: Pt to propel w/c >= 50 ft, Mod I, for home mobility  Additional Goals?: Yes  Long term goal 6: Pt to get in/out of car, min +1 for transportation needs. Following session, patient left in safe position with all fall risk precautions in place.

## 2023-03-01 NOTE — CONSULTS
Patient seen evaluated consult to be dictated now we will add Carafate to her regiment we will give her 1 dose of neostigmine to see if that will help improve the ileus she is going to be discharged home to nursing home tomorrow if she is good to be close to our office she can follow-up with office to for continuation of care from GI point of view

## 2023-03-01 NOTE — PROGRESS NOTES
[facility]  Occupational Therapy  Daily Note  Time:    Time In: 1000  Time Out: 1130  Timed Code Treatment Minutes: 90 Minutes  Minutes: 90          Date: 3/1/2023  Patient Name: Kaleigh Sierra,   Gender: female      Room: [facility]/055-A  MRN: [id number]  : 1950  (72 y.o.)  Referring Practitioner: Ramesh Smith MD  Diagnosis: closed intertrochanteric fracture, right  Additional Pertinent Hx: The patient was recently hospitalized from 2023 to 2023 initially for abdominal bloating, nausea and leg swelling.  She was found to have elevated D-dimer.  Subsequent testing revealed right lower lobe pulmonary embolism and right lower extremity DVT.  She was at initially treated with IV heparin and later transition to Xarelto.     The patient was brought to ER on 2022 because of progressively increased leg swelling, and nausea/vomiting associated with decreased oral intake.  She was found to have BNP of 256.7 and Bumex was prescribed.  She then had a fall accident  when she was going to toilet while she was in ER on 2022.  The fall resulted severe right hip pain.  X-ray of right hip and pelvis revealed acute right femoral intertrochanteric fracture with subtrochanteric extension.  Orthopedic service was consulted.  Xarelto was held in preparation for surgical management.  Cardiology was consulted on 2023 for elevated BNP and Lasix was started.  Because of difficulty voiding with urinary retention, urology was also consulted on 2023.  Urology service placed Wen with some difficulty on 2023.  Therefore Wen was kept in place.  On 2/3/2023 the patient underwent open treatment of right intertrochanteric fracture with cephalomedullary nail by Dr. Vin Aragon.  She is allowed weightbearing as tolerated at the right lower extremity postoperatively.    Restrictions/Precautions:  Restrictions/Precautions: General Precautions, Fall Risk,  Weight Bearing  Right Lower Extremity Weight Bearing: Weight Bearing As Tolerated  Position Activity Restriction  Other position/activity restrictions: Tracheostomy/collar 6L. With venturi mask for out of room use 8 liters portable O2. SUBJECTIVE: Baker Montes pleasant throughout session. Pt. Reports occasional stomach ache during session. PAIN: No numerics given, discomfort in stomach and R hip noted. Vitals: Vitals not assessed per clinical judgement, see nursing flowsheet    COGNITION: Decreased Insight and Decreased Safety Awareness    ADL:     EATING:Independent. [facility] [employer] CARE Score: 6. ORAL HYGIENE:Setup or clean-up assistance. seated. CARE Score: 5.     TOILETING HYGIENE:Partial/moderate assistance. Mod A clothing management and for flaquito care with increased cues to engage. [facility] [employer] CARE Score: 3. SHOWERING/BATHING:Partial/moderate assistance. Pt. required assistance with under belly folds, distal BLE, bottom. Pt. instructed to engage in task to best of ability. [facility] CARE Score: 3.     UPPER BODY DRESSING:Setup or clean-up assistance. to don pull over dress. CARE Score: 5. LOWER BODY DRESSING:Partial/moderate assistance. Overall Mod A. CARE Score: 3. FOOTWEAR:Partial/moderate assistance  Mod A. CARE Score: 3.     TOILET TRANSFER: Supervision or touching assistance. close SBA to [facility] to/from [facility]. CARE Score: 4. BALANCE:  Sitting Balance:  Modified Independent. Standing Balance: Stand By Assistance, [employer]. varied    BED MOBILITY:  Not Tested    TRANSFERS:  Sit <-> Stand:  Close Stand By Assistance, [employer]. FUNCTIONAL MOBILITY:  Assistive Device: Rolling Walker  Assist Level: Close SBA- CGA . Distance: within room    ADDITIONAL ACTIVITIES:  BIMs assessement and CAM completed with pt this date. Pt.  Instructed to engage in IADL task of locating items to prepare an egg and completed at seated level and able to complete with SBA when reaching OBOS and able to complete task at seated level, cues required for w/c placement to improve overall safety in the kitchen. Pt. Instructed to complete BUE HEP with use of teachback method, pt able to tolerate 15 reps in all joints/planes with 80% accuracy. Task performed to further advance I with ADL task completion. ASSESSMENT:     Activity Tolerance:  Patient tolerance of  treatment: Fair treatment tolerance, Limited by fatigue, Reduced activity pace, and Need for increased rest breaks       Discharge Recommendations: Subacute/skilled nursing facility and Patient would benefit from continued OT at discharge  Equipment Recommendations: Other: Monitor pending progress  Plan: Times Per Week: 5x per week for 90 minutes  Times Per Day: Once a day  Current Treatment Recommendations: Balance training, Functional mobility training, Endurance training, Self-Care / ADL, Safety education & training, Patient/Caregiver education & training, Strengthening    Patient Education  Patient Education: ADL's, IADL's, Importance of Increasing Activity, and Assistive Device Safety    Goals  Short Term Goals  Time Frame for Short Term Goals: until discharge  Short Term Goal 1: Pt will tolerate 12-15 min EOB ADL task with S to improve trunk control and activity tolerance required for EOB ADLs.   Short Term Goal 2: Pt will complete sit-stand t/fs with CGA x 1 with minimal cues of technique to progress towards BSC t/fs  Short Term Goal 3: Pt will tolerate standing 3 min with CGA for increased ease of clothing management nad LB bathing routine  Short Term Goal 4: Pt will safely navigate short distances with no > than CGA x1 and min VC for safety to increase indep with ADLs  Long Term Goals  Time Frame for Long Term Goals : 2 weeks from IPR OT eval  Long Term Goal 1: Pt will complete sit-stand t/fs with SBA x 1 with minimal cues of technique to progress towards BSC t/fs  Long Term Goal 2: Pt will perform UB/LB dressing and bathing with Min A and minimal cues for ECT to improve functional independence. Following session, patient left in safe position with all fall risk precautions in place.

## 2023-03-01 NOTE — PROGRESS NOTES
Discharge pain assessment:    Complete within 3 days of discharge. Pain Effect on Sleep ()   Ask patient: Son Cochran the past 5 days, how much of the time has pain made it hard for you to sleep at night?\" 1.  Rarely or not at all     If no pain is reported, end interview. If pain is reported, continue with the additional questions.    Pain Interference with Therapy Activities   Ask patient: Son Cochran the past 5 days, how often have you limited your participation in rehabilitation therapy sessions due to pain?\" 1.  Rarely or not at all   Pain Interference with Day to Day Activities   Ask patient: Son Cochran the past 5 days, how often have you limited your day to day activities (excluding rehabilitation therapy sessions) because of pain?\" 2.  Occasionally

## 2023-03-01 NOTE — PROGRESS NOTES
[address] [place] THERAPY  [facility]  DAILY NOTE    TIME   SLP Individual Minutes  Time In: 0930  Time Out: 1000  Minutes: 30  Timed Code Treatment Minutes: 30 Minutes       Date: 3/1/2023  Patient Name: Efrain Szymanski      CSN: [id number]   : 1950  (81 y.o.)  Gender: female   Referring Physician:  Stephanie Ontiveros MD  Diagnosis: Closed intertrochanteric fracture, right,  initial encounter  Precautions: Fall risk  Current Diet: Regular and Thin Liquids   Swallowing Strategies: Standard Universal Swallow Precautions  Date of Last MBS/FEES: Not Applicable    Pain:   - Pain location: R hip - RN aware      Subjective:  Patient sitting in recliner upon ST arrival. Agreeable to skilled ST services. Pleasant, cooperative and engaged throughout. Short-Term Goals:  SHORT TERM GOAL #1:  Goal 1: Patient will complete functional problem solving and reasoning tasks with 80% accuracy and min cuing in order to improve completion of ADLs/IADLs at discharge. INTERVENTIONS:   Novel Card Game - [address] [facility]  Patient with overall good execution of game this date. Patient with decreased visual scanning and attention to detail throughout game; improved success given min cues. Complex Money Management - Making Change  12/15 independent, 3/15 given min cues    *patient with good math computations via pen/paper; required min cuing to check amounts with independent self correction after cuing  *overall good success with task this date    [address] #2:  Goal 2: Patient will complete functional immediate, working, and delayed recall tasks of 6+ units of information, with or without use of trained recall strategies, with 85% accuracy given min cues to improve retention of pertinent medical information.   INTERVENTIONS:  Recall of Novel Card Game - [address] in [facility]  (Name, setup, how to play, how to win)  Delayed (~1 day): / independent    SHORT TERM GOAL #3:  Goal 3: Patient will complete functional thought organization and sequencing exercises with 90% accuracy and min cuing  in order to improve  completion  of  ADLs/IADLs. INTERVENTIONS: Did not address d/t focus on other goals. SHORT TERM GOAL #4:  Goal 4: Patient will complete complex sustained, selective, alternating, and divided attention tasks with no more than three  errors/redirections in five minutes/one task in order to allow for safe return to driving at discharge. INTERVENTIONS: Did not address this session d/t focus on other goals. Long-Term Goals:  Time Frame for Long Term Goals: 3 weeks    LONG TERM GOAL #1:  Goal 1: Patient will improve cognitive  functioning  to a level of modified independent in order to allow for safe return to PLOF. Comprehension: 5 - Patient understands basic needs (hungry/hot/pain)  Expression: 5 - Expresses basic ideas/needs only (hungry/hot/pain)  Social Interaction: 5 - Patient is appropriate with supervision/cues  Problem Solvin - Patient solves simple/routine tasks 75-90%+   Memory: 5 - Patient requires prompting with stress/unfamiliar situations    EDUCATION:  Learner: Patient  Education:  Reviewed ST goals and Plan of Care and Education Related to [employer] and Wellness  Evaluation of Education: [employer] understanding, Demonstrates with assistance, and Needs further instruction    ASSESSMENT/PLAN:   Activity Tolerance:  Patient tolerance of  treatment: good. Assessment/Plan: Patient progressing toward established goals. Continues to require skilled care of licensed speech pathologist to progress toward achievement of established goals and plan of care. Plan for Next Session: sequencing, thought organization, attention  Discharge Recommendations: [facility]     Chantal CARSON  Clinician

## 2023-03-01 NOTE — PLAN OF CARE
Problem: Respiratory - Adult  Goal: Achieves optimal ventilation and oxygenation  3/1/2023 0534 by Fabienne Yanez RCP  Outcome: Progressing   Wearing Trach mask with LVN for humidification tolerating well. Patient mutually agreed on goals.

## 2023-03-01 NOTE — PLAN OF CARE
Problem: Discharge Planning  Goal: Discharge to home or other facility with appropriate resources  3/1/2023 0308 by Sakina Salcido RN  Outcome: Progressing  Flowsheets (Taken 2/28/2023 2131)  Discharge to home or other facility with appropriate resources: Identify barriers to discharge with patient and caregiver     Problem: Skin/Tissue Integrity  Goal: Absence of new skin breakdown  Description: 1. Monitor for areas of redness and/or skin breakdown  2. Assess vascular access sites hourly  3. Every 4-6 hours minimum:  Change oxygen saturation probe site  4. Every 4-6 hours:  If on nasal continuous positive airway pressure, respiratory therapy assess nares and determine need for appliance change or resting period.   3/1/2023 0308 by Sakina Salcido RN  Outcome: Progressing     Problem: Safety - Adult  Goal: Free from fall injury  3/1/2023 0308 by Sakina Salcido RN  Outcome: Progressing     Problem: Pain  Goal: Verbalizes/displays adequate comfort level or baseline comfort level  3/1/2023 0308 by Sakina Salcido RN  Outcome: Progressing     Problem: Metabolic/Fluid and Electrolytes - Adult  Goal: Electrolytes maintained within normal limits  3/1/2023 0308 by Sakina Salcido RN  Outcome: Progressing  Flowsheets (Taken 2/28/2023 2131)  Electrolytes maintained within normal limits:   Monitor labs and assess patient for signs and symptoms of electrolyte imbalances   Administer electrolyte replacement as ordered     Problem: Respiratory - Adult  Goal: Achieves optimal ventilation and oxygenation  3/1/2023 0308 by Sakina Salcido RN  Outcome: Progressing  Flowsheets (Taken 2/28/2023 2131)  Achieves optimal ventilation and oxygenation:   Assess for changes in respiratory status   Assess for changes in mentation and behavior   Position to facilitate oxygenation and minimize respiratory effort   Assess the need for suctioning and aspirate as needed   Assess and instruct to report shortness of breath or any respiratory difficulty

## 2023-03-01 NOTE — PROGRESS NOTES
SERVANDO called [facility] as requested to make a referral.  SERVANDO talked to Radha and he is going to get on Epic and give SW a call back.

## 2023-03-01 NOTE — PROGRESS NOTES
[address] PHYSICAL THERAPY  DAILY NOTE  [address] [facility],4Th Floor - [id number]    Time In: 0700  Time Out: 0800  Timed Code Treatment Minutes: 60 Minutes  Minutes: 60          Date: 3/1/2023  Patient Name: Colin Craven,  Gender:  female        MRN: [id number]  : 1950  (81 y.o.)     Referring Practitioner: Dee Yun MD  Diagnosis: closed intertrochanteric fx, Rt  Additional Pertinent Hx: Debbi Larios is a 73yoF with PMH of recent PE and b/l DVT, macrocytic anemia, CAD, DM2, tracheostomy dependent, obesity, and HTN who presents for leg swelling, nausea, and constipation. She was recently discharged after an 8 day admission for DVTs and PEs. She was placed on Xarelto when discharged and stated that she has been compliant and only missed one dose. She returned due to chronic constipation, feeling ''bloated'', nausea, and LE edema. While being evaluated in the ED the pt had a mechanical fall and developed a R femur fracture. She was found to have supratheraputic INR at 3.83. CT head and C-spine both clear. Pt is s/p R femur ORIF by Dr Ashli Galdamez . Pt found to have acute fractures of L2-S1 likely 2/2 osteoporosis, ok for activity as tolerated per Dr Dennis Butler . Prior Level of Function:  Lives With: Spouse  Type of Home: House  Home Layout: One level  Home Access: Ramped entrance (or 1 step with no rails.)  Home Equipment: emil Martini, Lift chair (Pt sleeps in lift chair and was using it to assist up to stand PTA)   Bathroom Shower/Tub: Walk-in shower  Bathroom Toilet: Standard  Bathroom Equipment: Built-in shower seat, 3-in-1 commode    ADL Assistance: Needs assistance  Homemaking Assistance: Needs assistance  Ambulation Assistance: Independent  Transfer Assistance: Independent  Active : No  Additional Comments: pt amb short distances in the home with RW,  available  and able to assist as needed.  Has a passi valve that she uses at rest.    Restrictions/Precautions:  Restrictions/Precautions: General Precautions, Fall Risk, Weight Bearing  Right Lower Extremity Weight Bearing: Weight Bearing As Tolerated  Position Activity Restriction  Other position/activity restrictions: Tracheostomy/collar 6L. With venturi mask for out of room use 8 liters portable O2. SUBJECTIVE: Pt resting in bed. Pleasant and cooperative. PAIN: 0/10:     Vitals: Vitals not assessed per clinical judgement, see nursing flowsheet    OBJECTIVE:  Bed Mobility:  Rolling to Left: Modified Independent, with rail   Rolling to Right: Modified Independent, with rail   Supine to Sit: Modified Independent, with rail, coming up from LT side. Transfers:  Sit to Stand: Contact Guard Assistance  Stand to [place] [id number]  To/From [employer] and Chair: [employer], with RW . X3 trials. Takes 3-4 steps to complete. Forward flexed trunk throughout. Decreased step length margarita. Wheelchair Mobility:  Modified Independent  Extremities Used: Bilateral Upper Extremities  Type of Chair:Manual  Surface: Level Tile  Distance: 150 ft  Quality: Slow Velocity and Short Strokes     Balance:Dynamic  Standing Balance: Contact Guard Assistance, with RW support during pericare and clothing management. Exercise:      Exercises were completed for increased independence with functional mobility. Supine- glute sets, margarita quad sets with manual and verbal cues for increased m. Activation, margarita ankle pumps with manual cues to increase df x10 reps ea. Functional Outcome Measures: Not completed       ASSESSMENT:  Assessment: Patient progressing toward established goals. Activity Tolerance:  Patient tolerance of  treatment: good. Pt reported nausea.        Equipment Recommendations:Equipment Needed: No  Other: Pt has RW, manual w/c and mechanical lift device  Discharge Recommendations: Continue to assess pending progress and Patient would benefit from continued PT at discharge  Plan: Current Treatment Recommendations: Strengthening, Balance training, Functional mobility training, Transfer training, Endurance training, Equipment evaluation, education, & procurement, Patient/Caregiver education & training, Safety education & training, Therapeutic activities, Home exercise program, Wheelchair mobility training, Gait training, Pain management  General Plan:  (5x/ wk 90 min)    Patient Education  Patient Education: Bed Mobility, Transfers,  - Patient Verbalized Understanding, - Patient Requires Continued Education    Goals:  Patient Goals : get around o my own without the RW  Short Term Goals  Time Frame for Short Term Goals: 1 wk  Short Term Goal 1: Pt will go from supine <->sit, mod +1 to get in/out of bed. MET, see LTG  Short Term Goal 2: Pt will get up/down from bed, into JEANNETTE stedy device, +1 mod assist to progress to up to RW GOAL MET, SEE LTG  Short Term Goal 3: Pt will  the JEANNETTE stedy device, +1 min assist x5 min, to progress to standing with RW MET, see LTG  Short Term Goal 4: Pt will propel w/c >= 50 ft, tile surface, CGA for home mobility. GOAL MET, SEE LTG  Short Term Goal 5: Pt to transfer sit <--> stand min A for increased functional mobility. MET, see LTG  Long Term Goals  Time Frame for Long Term Goals : 3 wks from evaluation. Long Term Goal 1: Pt to go supine <->sit, min +1 to get in/out of bed, bed flat, no rail. Long Term Goal 2: Pt to get up/down from bed or w/c +1 contact guard assist, consistently, to get up to walk. Long Term Goal 3: Pt to perform stand/pivot transfer with RW, +1 contact guard assist to get in/out of w/c. Long Term Goal 4: Pt to walk with RW >= 10 ft, mn +1 to progress to home mobility  Long Term Goal 5: Pt to propel w/c >= 50 ft, Mod I, for home mobility  Additional Goals?: Yes  Long term goal 6: Pt to get in/out of car, min +1 for transportation needs.     Following session, patient left in safe position with all fall risk precautions in place.

## 2023-03-01 NOTE — PROGRESS NOTES
Patients spouse and daughter came into SW office and was talking. Gave SW a few more places they want SW to send referral to. Gina in [address] gave referral to Radha. Radha was going to review and give SW a call back. [facility] in [address] left message with admissions. [facility] [facility] called [facility] and spoke with Rosalia Gay. Rosalia Gay was going to review and call SW back.

## 2023-03-01 NOTE — PROGRESS NOTES
[facility]  Recreational Therapy  Daily Note  [facility]    Time Spent with Patient: 10 minutes    Date:  3/1/2023       Patient Name: Kaleigh Sierra      MRN: [id number]      YOB: 1950 (72 y.o.)       Gender: female  Diagnosis: closed intertrochanteric fracture, right  Referring Practitioner: Ramesh Smith MD    RESTRICTIONS/PRECAUTIONS:  Restrictions/Precautions: General Precautions, Fall Risk, Weight Bearing     Hearing: Within functional limits    PAIN: 0-no c/o pain    SUBJECTIVE:  I'm not sure     OBJECTIVE:  Pt might want to get her hair washed and styled tomorrow by our beautician if she is still here-would like me to ask her later or tomorrow morning          Patient Education  New Education Provided: Importance of Leisure,     Electronically signed by: ANGELINE CarrS  Date: 3/1/2023

## 2023-03-01 NOTE — PROGRESS NOTES
Alison Anne from [place] [address] called and left a message; they are unable to accept patient because they are not trained for trachs.

## 2023-03-02 ENCOUNTER — APPOINTMENT (OUTPATIENT)
Dept: GENERAL RADIOLOGY | Age: 73
DRG: 560 | End: 2023-03-02
Attending: PHYSICAL MEDICINE & REHABILITATION
Payer: MEDICARE

## 2023-03-02 ENCOUNTER — TELEPHONE (OUTPATIENT)
Dept: UROLOGY | Age: 73
End: 2023-03-02

## 2023-03-02 VITALS
BODY MASS INDEX: 41.88 KG/M2 | TEMPERATURE: 98.2 F | HEIGHT: 62 IN | RESPIRATION RATE: 17 BRPM | OXYGEN SATURATION: 100 % | HEART RATE: 87 BPM | SYSTOLIC BLOOD PRESSURE: 130 MMHG | DIASTOLIC BLOOD PRESSURE: 75 MMHG | WEIGHT: 227.56 LBS

## 2023-03-02 LAB
ANION GAP SERPL CALC-SCNC: 14 MEQ/L (ref 8–16)
BUN SERPL-MCNC: 16 MG/DL (ref 7–22)
CALCIUM SERPL-MCNC: 8.5 MG/DL (ref 8.5–10.5)
CHLORIDE SERPL-SCNC: 99 MEQ/L (ref 98–111)
CO2 SERPL-SCNC: 25 MEQ/L (ref 23–33)
CREAT SERPL-MCNC: 0.7 MG/DL (ref 0.4–1.2)
GFR SERPL CREATININE-BSD FRML MDRD: > 60 ML/MIN/1.73M2
GLUCOSE BLD STRIP.AUTO-MCNC: 63 MG/DL (ref 70–108)
GLUCOSE SERPL-MCNC: 51 MG/DL (ref 70–108)
POTASSIUM SERPL-SCNC: 3.1 MEQ/L (ref 3.5–5.2)
SARS-COV-2 RDRP RESP QL NAA+PROBE: NOT  DETECTED
SODIUM SERPL-SCNC: 138 MEQ/L (ref 135–145)

## 2023-03-02 PROCEDURE — 80048 BASIC METABOLIC PNL TOTAL CA: CPT

## 2023-03-02 PROCEDURE — 94760 N-INVAS EAR/PLS OXIMETRY 1: CPT

## 2023-03-02 PROCEDURE — 97110 THERAPEUTIC EXERCISES: CPT

## 2023-03-02 PROCEDURE — 97530 THERAPEUTIC ACTIVITIES: CPT

## 2023-03-02 PROCEDURE — 74018 RADEX ABDOMEN 1 VIEW: CPT

## 2023-03-02 PROCEDURE — 6370000000 HC RX 637 (ALT 250 FOR IP): Performed by: PHYSICAL MEDICINE & REHABILITATION

## 2023-03-02 PROCEDURE — 6370000000 HC RX 637 (ALT 250 FOR IP): Performed by: FAMILY MEDICINE

## 2023-03-02 PROCEDURE — 2700000000 HC OXYGEN THERAPY PER DAY

## 2023-03-02 PROCEDURE — 36415 COLL VENOUS BLD VENIPUNCTURE: CPT

## 2023-03-02 PROCEDURE — 82948 REAGENT STRIP/BLOOD GLUCOSE: CPT

## 2023-03-02 PROCEDURE — 87635 SARS-COV-2 COVID-19 AMP PRB: CPT

## 2023-03-02 PROCEDURE — 99239 HOSP IP/OBS DSCHRG MGMT >30: CPT | Performed by: PHYSICAL MEDICINE & REHABILITATION

## 2023-03-02 PROCEDURE — 6370000000 HC RX 637 (ALT 250 FOR IP): Performed by: INTERNAL MEDICINE

## 2023-03-02 RX ORDER — SUCRALFATE 1 G/1
1 TABLET ORAL
Qty: 120 TABLET | Refills: 3 | DISCHARGE
Start: 2023-03-02

## 2023-03-02 RX ORDER — METOLAZONE 2.5 MG/1
2.5 TABLET ORAL DAILY
Qty: 30 TABLET | Refills: 3 | Status: SHIPPED | OUTPATIENT
Start: 2023-03-02

## 2023-03-02 RX ORDER — SIMETHICONE 80 MG
80 TABLET,CHEWABLE ORAL EVERY 6 HOURS PRN
Qty: 180 TABLET | Refills: 3 | Status: SHIPPED | OUTPATIENT
Start: 2023-03-02

## 2023-03-02 RX ORDER — FUROSEMIDE 40 MG/1
40 TABLET ORAL DAILY
Qty: 60 TABLET | Refills: 3 | Status: SHIPPED | OUTPATIENT
Start: 2023-03-02

## 2023-03-02 RX ORDER — QUETIAPINE FUMARATE 25 MG/1
25 TABLET, FILM COATED ORAL NIGHTLY
Qty: 60 TABLET | Refills: 3 | Status: SHIPPED | OUTPATIENT
Start: 2023-03-02

## 2023-03-02 RX ORDER — LANOLIN ALCOHOL/MO/W.PET/CERES
6 CREAM (GRAM) TOPICAL NIGHTLY
Qty: 60 TABLET | Refills: 3 | Status: SHIPPED | OUTPATIENT
Start: 2023-03-02

## 2023-03-02 RX ORDER — TRAMADOL HYDROCHLORIDE 50 MG/1
25-50 TABLET ORAL EVERY 6 HOURS PRN
Qty: 28 TABLET | Refills: 0 | Status: SHIPPED | OUTPATIENT
Start: 2023-03-02 | End: 2023-03-09

## 2023-03-02 RX ORDER — PROMETHAZINE HYDROCHLORIDE 25 MG/1
25 TABLET ORAL EVERY 6 HOURS PRN
Qty: 90 TABLET | Refills: 1 | Status: SHIPPED | OUTPATIENT
Start: 2023-03-02

## 2023-03-02 RX ORDER — POTASSIUM CHLORIDE 20 MEQ/1
20 TABLET, EXTENDED RELEASE ORAL 2 TIMES DAILY
Qty: 180 TABLET | Refills: 1 | DISCHARGE
Start: 2023-03-03 | End: 2023-03-04

## 2023-03-02 RX ORDER — FOLIC ACID 1 MG/1
1 TABLET ORAL DAILY
Qty: 30 TABLET | Refills: 3 | Status: SHIPPED | OUTPATIENT
Start: 2023-03-02

## 2023-03-02 RX ORDER — LACTOBACILLUS RHAMNOSUS GG 10B CELL
1 CAPSULE ORAL
Qty: 30 CAPSULE | Refills: 2 | Status: SHIPPED | OUTPATIENT
Start: 2023-03-02

## 2023-03-02 RX ORDER — LOPERAMIDE HYDROCHLORIDE 2 MG/1
2 CAPSULE ORAL 4 TIMES DAILY PRN
Qty: 60 CAPSULE | Refills: 2 | Status: SHIPPED | OUTPATIENT
Start: 2023-03-02 | End: 2023-03-12

## 2023-03-02 RX ORDER — MEGESTROL ACETATE 40 MG/1
40 TABLET ORAL DAILY
Qty: 30 TABLET | Refills: 3 | Status: SHIPPED | OUTPATIENT
Start: 2023-03-02

## 2023-03-02 RX ORDER — TRAZODONE HYDROCHLORIDE 100 MG/1
100 TABLET ORAL NIGHTLY
Qty: 30 TABLET | Refills: 2 | Status: SHIPPED | OUTPATIENT
Start: 2023-03-02

## 2023-03-02 RX ORDER — SENNA PLUS 8.6 MG/1
2 TABLET ORAL DAILY
DISCHARGE
Start: 2023-03-02 | End: 2023-04-01

## 2023-03-02 RX ORDER — INSULIN GLARGINE 100 [IU]/ML
20 INJECTION, SOLUTION SUBCUTANEOUS NIGHTLY
Qty: 5 ADJUSTABLE DOSE PRE-FILLED PEN SYRINGE | Refills: 3 | Status: SHIPPED | OUTPATIENT
Start: 2023-03-02

## 2023-03-02 RX ORDER — MULTIVITAMIN WITH IRON
1 TABLET ORAL DAILY
Qty: 30 TABLET | Refills: 3 | Status: SHIPPED | OUTPATIENT
Start: 2023-03-02

## 2023-03-02 RX ORDER — TRAMADOL HYDROCHLORIDE 50 MG/1
25-50 TABLET ORAL EVERY 6 HOURS PRN
Qty: 28 TABLET | Refills: 0 | Status: SHIPPED | OUTPATIENT
Start: 2023-03-02 | End: 2023-03-02 | Stop reason: SDUPTHER

## 2023-03-02 RX ADMIN — SUCRALFATE 1 G: 1 TABLET ORAL at 06:03

## 2023-03-02 RX ADMIN — Medication 1 CAPSULE: at 10:17

## 2023-03-02 RX ADMIN — FOLIC ACID 1 MG: 1 TABLET ORAL at 10:15

## 2023-03-02 RX ADMIN — ACETAMINOPHEN 650 MG: 325 TABLET ORAL at 03:40

## 2023-03-02 RX ADMIN — PANTOPRAZOLE SODIUM 40 MG: 40 TABLET, DELAYED RELEASE ORAL at 06:03

## 2023-03-02 RX ADMIN — SUCRALFATE 1 G: 1 TABLET ORAL at 10:16

## 2023-03-02 RX ADMIN — METOLAZONE 2.5 MG: 2.5 TABLET ORAL at 10:15

## 2023-03-02 RX ADMIN — FUROSEMIDE 40 MG: 40 TABLET ORAL at 10:17

## 2023-03-02 RX ADMIN — ASPIRIN 81 MG 81 MG: 81 TABLET ORAL at 10:16

## 2023-03-02 RX ADMIN — MEGESTROL ACETATE 40 MG: 40 TABLET ORAL at 11:25

## 2023-03-02 RX ADMIN — DICLOFENAC SODIUM 2 G: 10 GEL TOPICAL at 10:17

## 2023-03-02 RX ADMIN — POTASSIUM CHLORIDE 40 MEQ: 1500 TABLET, EXTENDED RELEASE ORAL at 10:16

## 2023-03-02 RX ADMIN — Medication 1 TABLET: at 10:15

## 2023-03-02 RX ADMIN — METOPROLOL SUCCINATE 25 MG: 25 TABLET, FILM COATED, EXTENDED RELEASE ORAL at 10:21

## 2023-03-02 RX ADMIN — ACETAMINOPHEN 650 MG: 325 TABLET ORAL at 10:13

## 2023-03-02 RX ADMIN — MICONAZOLE NITRATE: 20 POWDER TOPICAL at 10:25

## 2023-03-02 ASSESSMENT — PAIN DESCRIPTION - DESCRIPTORS: DESCRIPTORS: ACHING;SORE

## 2023-03-02 ASSESSMENT — PAIN - FUNCTIONAL ASSESSMENT: PAIN_FUNCTIONAL_ASSESSMENT: PREVENTS OR INTERFERES SOME ACTIVE ACTIVITIES AND ADLS

## 2023-03-02 ASSESSMENT — PAIN DESCRIPTION - ORIENTATION: ORIENTATION: RIGHT

## 2023-03-02 ASSESSMENT — PAIN DESCRIPTION - LOCATION: LOCATION: HIP

## 2023-03-02 ASSESSMENT — PAIN SCALES - GENERAL: PAINLEVEL_OUTOF10: 7

## 2023-03-02 NOTE — PROGRESS NOTES
Contacted Jaspreet Armendariz CNP from urology regarding patient follow up. Stated his office will call patient to coordinate f/u, please do a void trail in a couple weeks. Will notify kareen ridley when giving report.

## 2023-03-02 NOTE — PROGRESS NOTES
Order received for PICC removal per Dr. Amisha Freire . Patient placed in bed. Transparent dressing removed. Skin accessed appears clean and dry. Area cleaned with chloro prep, sterile vaseline gauze and sterile gauze placed over site, PICC removed with tip intact, pressure held with sterile gauze for 3 minutes. New transparent dressing applied. Educated the patient on remaining in bed for one hour, dressing stays on for 24 hours, signs and symptoms of infection and notify primary nurse if any blood or oozing. Primary RN notified.

## 2023-03-02 NOTE — PLAN OF CARE
Care plan reviewed with patient. Patient verbalize understanding of the plan of care and contribute to goal setting. Problem: Chronic Conditions and Co-morbidities  Goal: Patient's chronic conditions and co-morbidity symptoms are monitored and maintained or improved  3/2/2023 1222 by George Pedroza LPN  Outcome: Completed  3/2/2023 0218 by Olesya Suárez RN  Outcome: Progressing  Flowsheets (Taken 3/1/2023 2019)  Care Plan - Patient's Chronic Conditions and Co-Morbidity Symptoms are Monitored and Maintained or Improved: Monitor and assess patient's chronic conditions and comorbid symptoms for stability, deterioration, or improvement     Problem: Discharge Planning  Goal: Discharge to home or other facility with appropriate resources  3/2/2023 1222 by George Pedroza LPN  Outcome: Completed  3/2/2023 0218 by Olesya Suárez RN  Outcome: Progressing  Flowsheets (Taken 3/1/2023 2019)  Discharge to home or other facility with appropriate resources: Identify barriers to discharge with patient and caregiver     Problem: Skin/Tissue Integrity  Goal: Absence of new skin breakdown  Description: 1. Monitor for areas of redness and/or skin breakdown  2. Assess vascular access sites hourly  3. Every 4-6 hours minimum:  Change oxygen saturation probe site  4. Every 4-6 hours:  If on nasal continuous positive airway pressure, respiratory therapy assess nares and determine need for appliance change or resting period.   3/2/2023 1222 by George Pedroza LPN  Outcome: Completed  3/2/2023 0218 by Olesya Suárez RN  Outcome: Progressing     Problem: ABCDS Injury Assessment  Goal: Absence of physical injury  Outcome: Completed     Problem: Safety - Adult  Goal: Free from fall injury  3/2/2023 1222 by George Pedroza LPN  Outcome: Completed  3/2/2023 0218 by Olesya Suárez RN  Outcome: Progressing     Problem: Pain  Goal: Verbalizes/displays adequate comfort level or baseline comfort level  3/2/2023 1222 by Ciera Narvaez Hauenstein, LPN  Outcome: Completed  3/2/2023 0218 by Janeen Stanley RN  Outcome: Progressing     Problem: Nutrition Deficit:  Goal: Optimize nutritional status  Outcome: Completed     Problem: Metabolic/Fluid and Electrolytes - Adult  Goal: Electrolytes maintained within normal limits  3/2/2023 1222 by Sarah Sadler LPN  Outcome: Completed  3/2/2023 0218 by Janeen Stanley RN  Outcome: Progressing  Flowsheets (Taken 3/1/2023 2019)  Electrolytes maintained within normal limits:   Monitor labs and assess patient for signs and symptoms of electrolyte imbalances   Administer electrolyte replacement as ordered  Goal: Hemodynamic stability and optimal renal function maintained  Outcome: Completed  Goal: Glucose maintained within prescribed range  Outcome: Completed     Problem: Respiratory - Adult  Goal: Achieves optimal ventilation and oxygenation  3/2/2023 1222 by Sarah Sadler LPN  Outcome: Completed  3/2/2023 0218 by Janeen Stanley RN  Outcome: Progressing  Flowsheets (Taken 3/1/2023 2019)  Achieves optimal ventilation and oxygenation:   Assess for changes in respiratory status   Assess for changes in mentation and behavior   Position to facilitate oxygenation and minimize respiratory effort   Assess the need for suctioning and aspirate as needed   Encourage broncho-pulmonary hygiene including cough, deep breathe, incentive spirometry   Assess and instruct to report shortness of breath or any respiratory difficulty   Respiratory therapy support as indicated

## 2023-03-02 NOTE — PROGRESS NOTES
Patient discharged in stable condition as per order of attending physician to [address] per staff at Time: 29-29-69-33 and [address] Wheelchair to car. AVS provided by RN at time of discharge, which includes all necessary medical information pertaining to the patients current course of illness, treatment, medications, post-discharge goals of care, and treatment preferences. Patient/ family verbalize understanding of discharge plan and are in agreement with goal/plan/treatment preferences. Belongings including None, clothing, trach and bath supplies  sent with patient. Home medications sent home with patient N/A    Availability of \"My Chart\" offered to patient as a tool for updated health record. Steps for activation discussed with patient as mentioned on AVS.      Report called to RN at [facility].  At 1305pm

## 2023-03-02 NOTE — PROGRESS NOTES
SW talked to patients spouse about transportation to get to the facility; patients spouse said he will take patient himself because he did not want to privately pay for wheelchair transport. Patient was accepted to [facility]. SW informed patient and patients spouse. SW gave patients spouse blue transport envelope. Inpatient PT therapy helped transfer patient to vehicle.

## 2023-03-02 NOTE — PROGRESS NOTES
[address]  DISCHARGE NOTE    Date: 3/2/2023  Patient Name: Mechelle Stephen,   Gender: female      MRN: [id number]  : 1950  (81 y.o.)  Referring Practitioner: Christopher Mijares MD  Diagnosis: closed intertrochanteric fracture, right  Additional Pertinent Hx: The patient was recently hospitalized from 2023 to 2023 initially for abdominal bloating, nausea and leg swelling. She was found to have elevated D-dimer. Subsequent testing revealed right lower lobe pulmonary embolism and right lower extremity DVT. She was at initially treated with IV heparin and later transition to [address] [place] [address]. The patient was brought to ER on 2022 because of progressively increased leg swelling, and nausea/vomiting associated with decreased oral intake. She was found to have BNP of 256.7 and Bumex was prescribed. She then had a fall accident  when she was going to toilet while she was in ER on 2022. The fall resulted severe right hip pain. X-ray of right hip and pelvis revealed acute right femoral intertrochanteric fracture with subtrochanteric extension. Orthopedic service was consulted. Xarelto was held in preparation for surgical management. Cardiology was consulted on 2023 for elevated BNP and Lasix was started. Because of difficulty voiding with urinary retention, urology was also consulted on 2023. Urology service placed Wen with some difficulty on 2023. Therefore Wen was kept in place. On 2/3/2023 the patient underwent open treatment of right intertrochanteric fracture with cephalomedullary nail by Dr. Pascale Baum. She is allowed weightbearing as tolerated at the right lower extremity postoperatively.     Restrictions/Precautions:  Restrictions/Precautions: General Precautions, Fall Risk, Weight Bearing    Right Lower Extremity Weight Bearing: Weight Bearing As Tolerated       Position Activity Restriction  Other position/activity restrictions: Tracheostomy/collar 6L. With venturi mask for out of room use 8 liters portable O2.          Past Medical History:   Diagnosis Date    Arthritis     Coronary artery disease     COVID     Diabetic neuropathy ([address]     Bilateral feet numbness    DVT (deep venous thrombosis) ([address] 2023    Right lower extremity    Glottic stenosis     3/2022    Hyperlipidemia     Lower back pain     With diagnosis of having \"stress fracture\" by ortho at [facility]    Primary hypertension     Pulmonary embolism ([address] 2023    Right lower lobe    Type 2 diabetes mellitus ([address]      Past Surgical History:   Procedure Laterality Date    CARDIAC SURGERY      CATARACT REMOVAL Bilateral      SECTION      3 times    CORONARY ANGIOPLASTY WITH STENT PLACEMENT      stenting twice    EYE SURGERY      HIATAL HERNIA REPAIR      late     HIP SURGERY Right 2/3/2023    RIGHT FEMUR IM NAIL WITH INTERTAN performed by Pancho Hobbs MD at [facility], [address] ([address] [facility])      in     LARYNGOSCOPY N/A 3/22/2022    SUSPENSON LARYNGOSCOPY WITH JET VENT, DILATION performed by Adriel Strickland MD at [address] N/A 3/28/2022    SUSPENSION MICROLARYNGOSCOPY WITH BALLOON DILATION AND JET VENT, KENALOG INJECTION performed by Adriel Strickland MD at [address] N/A 2022    Suspension Laryngoscopy  Lateral of Right True Vocal Cord, With Steroid Injection of Larynx And Tracheostomy Tube Change, debridement performed by Jonathan Gorman MD at [address] N/A 8/10/2022    TRANSORAL LARYNGOPLASTY WITH LEFT PARTIAL ARYTENODECTOMY AND POSS LATERALIZATION, ADVANCEMENT FLAP RECONSTRUCTION WITH JET VENT,THERAPUTIC BRONCHOSCOPY WITH DEBRIEDMENT,WITH BALLOON DILITATION performed by Jonathan Gorman MD at [address] N/A 2022    Laryngoscopy with Dilation Debridement  Bronchoscopy with Dilation & Resection of Obstructing Tissues Transoral Laryngoplasty Left True Vocal Fold Lateralization left partial aryteniodectomy performed by Nancy Adams MD at [address]      in [address] 4/1/2022    TRACHEOTOMY TUBE REPLACEMENT performed by Waddell Phalen, MD at [facility]. Not seen on date of d/c due to d/c to SNF on 3/2/23. Assessment:  Assessment: Pt. Has progressed some since [facility], but still requires assistance for ADLs/transfers and IADL task completion. Pt. Appropriate to transition to SNF with 24 hour supervision/assistance to best fit her current needs. Equipment Recommendations: Other: Monitor pending progress    Plan:  Discharge patient to Skilled Nursing Facility    Goals:  Short Term Goals  Time Frame for Short Term Goals: until discharge  Short Term Goal 1: Pt will tolerate 12-15 min EOB ADL task with S to improve trunk control and activity tolerance required for EOB ADLs. GOAL MET  Short Term Goal 2: Pt will complete sit-stand t/fs with CGA x 1 with minimal cues of technique to progress towards BSC t/fs GOAL MET  Short Term Goal 3: Pt will tolerate standing 3 min with CGA for increased ease of clothing management nad LB bathing routine GOAL NOT MET  Short Term Goal 4: Pt will safely navigate short distances with no > than CGA x1 and min VC for safety to increase indep with ADLs GOAL NOT MET CONSISTENLY    Long Term Goals  Time Frame for Long Term Goals : 2 weeks from [facility] OT eval  Long Term Goal 1: Pt will complete sit-stand t/fs with SBA x 1 with minimal cues of technique to progress towards BSC t/fs GOAL MET  Long Term Goal 2: Pt will perform UB/LB dressing and bathing with Min A and minimal cues for ECT to improve functional independence.  GOAL PARTIALLY MET

## 2023-03-02 NOTE — CONSULTS
[address] [place] [address]                                  CONSULTATION    PATIENT NAME: Jose Juan Ron                      :        1950  MED REC NO:   [id number]                           ROOM:       [id number]  ACCOUNT NO:   [de-identified]                           ADMIT DATE: 02/10/2023  PROVIDER:     MARICARMEN Gomezilda Samijosie DATE:  2023    HISTORY OF PRESENT ILLNESS:  The patient is seen in GI consult for  evaluation with nausea. She came in to the hospital for fracture in the  ER. She had hip fracture. She has hip surgery. She has rehab at this  time. Following which she is having nausea. She has lost weight. Her  appetite is not the best at this time. She is not interested in food. She was seen in GI evaluation for that. She had a colonoscopy in the  past.  She had no other complaints at this time. She is basically a  53-year-old female. She has diabetes for years. She is not sure if she  had gastroparesis or not. She feels more nauseated at this time. She  has dry heaves. She has a very few amount of vomit, not throwing a lot,  not significant. She lost weight because she is not eating a lot  because of nausea. She is on PPI, despite that has slight improvement   at this time. She has no dysphagia, no odynophagia. She has no  regurgitation. No vomiting of undigested food. She has early satiety. She does not finish her food. She has had bowel movement. No blood with   the stool. No mucus discharge with the stool. She has no melena. She   has lower GI bleed. Her major issue at this time is basically nausea as   well as early satiety as well as weight loss. PAST MEDICAL HISTORY:  Significant for hip fracture, intertrochanteric  fracture repaired surgically, diabetes, coronary artery disease, history  of COVID-19 in the past, then she had tracheostomy.   She has not been  weaned off tracheostomy at this time, cardiomyopathy, diabetes, deep  vein thrombosis, stenosis, post tracheostomy, hyperlipidemia,  lower back pain, primary hypertension. PAST SURGICAL HISTORY:  Significant for , coronary angioplasty,  eye surgery, hydrocele surgery, hysterectomy, right shoulder surgical  repair, endoscopy more than one time, tracheostomy. SOCIAL HISTORY:  She never smoked. She drinks alcohol, but not very  heavy. No drug abuse. FAMILY HISTORY:  Family history of lung cancer, Parkinson disease. MEDICATIONS:  She is on insulin, Zaroxolyn, Megace, lactobacillus,  Imodium as needed, _______, Lasix, Protonix, Mylicon, tramadol, Crestor,  Senokot, dextrose, metoprolol, melatonin tablet, Seroquel, Micotin  powder. ALLERGIES:  METFORMIN RELATED PRODUCT, CODEINE, SULFA ANTIBIOTIC AND  SUMATRIPTAN. PHYSICAL EXAMINATION:  GENERAL:  Appeared to be comfortable. She has tracheostomy. She is  able to talk once she put tracheal device. She has tracheal mask at this  time. VITAL SIGNS:  Her weight is 206. Her weight has diminished. 02/10/2023  she was 232. HEENT:  Her head is atraumatic. Sclerae are anicteric. Oral cavity, no  lesion seen. Posterior tracheotomy site is clean. She has no  significant adenopathy. CHEST:  Normal.  CARDIOVASCULAR:  Normal.  ABDOMEN:  Soft, slight epigastric tenderness, no rebound, no guarding,  no organomegaly. EXTREMITIES:  No clubbing, no cyanosis. LABORATORY DATA:  Her sodium and potassium are normal.  Her BUN and  creatinine are normal.  Her white blood cell count is 8.7. Her H and H  are 9 and 28.9, MCV 90.9. Platelet is within normal range. IMAGING STUDY:  CT scan of the abdomen is done showed possible ileus. It was sent to pathology. Pericolic inflammation change along the  sigmoid area and descending colon with diverticulosis cannot be ruled  out, that was from 2023, not recent.     IMPRESSION:  The patient has nausea, gastritis, peptic ulcer disease,  bile-induced gastritis, gastroparesis, all cannot be ruled out, history  of diabetes, history of hip fracture that was fixed surgically,  hypertension. PLAN:  1. Conservative management at this time. Continue PPI. We will add  Carafate to the regime. Not enough time to do more workup like  endoscopy or ultrasound of upper quadrant. 2.  The patient needs in [facility] in [facility]. She is to follow up in the  office and complete with the GI evaluation. Future plan for right upper quadrant  abdominal ultrasound as well as EGD to be done in ER. Thank you for allowing me to participate in this patient's care.         Paco Dyer M.D.    D: 03/01/2023 15:46:11       T: 03/01/2023 21:00:17     [email or web address]  Job#: [id number]     Doc#: [id number]    CC:  Palomo Hayward Md

## 2023-03-02 NOTE — PROGRESS NOTES
Spoke with Clayton Rahman from ENT about PICC removal. Stated ok to go ahead and take it out at this time to prevent infection d/t unknown time frame when it would be used again.

## 2023-03-02 NOTE — TELEPHONE ENCOUNTER
Consult for urinary retention  Had werner placed by Thelma Mario early Feb  Needs OV in 1-2 wks to discuss werner removal vs exchange with [facility]  Plan for d/c today to [facility]

## 2023-03-02 NOTE — PLAN OF CARE
Problem: Chronic Conditions and Co-morbidities  Goal: Patient's chronic conditions and co-morbidity symptoms are monitored and maintained or improved  3/2/2023 0218 by Nara Jaimes RN  Outcome: Progressing  Flowsheets (Taken 3/1/2023 2019)  Care Plan - Patient's Chronic Conditions and Co-Morbidity Symptoms are Monitored and Maintained or Improved: Monitor and assess patient's chronic conditions and comorbid symptoms for stability, deterioration, or improvement     Problem: Discharge Planning  Goal: Discharge to home or other facility with appropriate resources  3/2/2023 0218 by Nraa Jaimes RN  Outcome: Progressing  Flowsheets (Taken 3/1/2023 2019)  Discharge to home or other facility with appropriate resources: Identify barriers to discharge with patient and caregiver     Problem: Skin/Tissue Integrity  Goal: Absence of new skin breakdown  Description: 1.  Monitor for areas of redness and/or skin breakdown  2.  Assess vascular access sites hourly  3.  Every 4-6 hours minimum:  Change oxygen saturation probe site  4.  Every 4-6 hours:  If on nasal continuous positive airway pressure, respiratory therapy assess nares and determine need for appliance change or resting period.  3/2/2023 0218 by Nara Jaimes RN  Outcome: Progressing     Problem: Safety - Adult  Goal: Free from fall injury  3/2/2023 0218 by Nara Jaimes RN  Outcome: Progressing     Problem: Pain  Goal: Verbalizes/displays adequate comfort level or baseline comfort level  3/2/2023 0218 by Nara Jaimes RN  Outcome: Progressing     Problem: Metabolic/Fluid and Electrolytes - Adult  Goal: Electrolytes maintained within normal limits  3/2/2023 0218 by Nara Jaimes RN  Outcome: Progressing  Flowsheets (Taken 3/1/2023 2019)  Electrolytes maintained within normal limits:   Monitor labs and assess patient for signs and symptoms of electrolyte imbalances   Administer electrolyte replacement as ordered     Problem: Respiratory - Adult  Goal: Achieves  optimal ventilation and oxygenation  3/2/2023 0218 by Sakina Salcido, RN  Outcome: Progressing  Flowsheets (Taken 3/1/2023 2019)  Achieves optimal ventilation and oxygenation:   Assess for changes in respiratory status   Assess for changes in mentation and behavior   Position to facilitate oxygenation and minimize respiratory effort   Assess the need for suctioning and aspirate as needed   Encourage broncho-pulmonary hygiene including cough, deep breathe, incentive spirometry   Assess and instruct to report shortness of breath or any respiratory difficulty   Respiratory therapy support as indicated

## 2023-03-02 NOTE — DISCHARGE INSTR - COC
Continuity of Care Form    Patient Name: Gwenith Phoenix   :  1950  MRN:  [id number]    Admit date:  2/10/2023  Discharge date:  2023    Code Status Order: Full Code   Advance Directives:     Admitting Physician:  Sidney Mehta MD  PCP: Sanchez Blas MD    Discharging Nurse: STEFANIA Tucker [facility] Unit/Room#: 7E-55/055-A  Discharging Unit Phone Number: [phone]    Emergency Contact:   Extended Emergency Contact Information  Primary Emergency Contact: [address] [place] [address] Phone: [phone]  Mobile Phone: [phone]  Relation: Spouse  Secondary Emergency Contact: Rosie Blount  Home Phone: [phone]  Mobile Phone: [phone]  Relation: Child    Past Surgical History:  Past Surgical History:   Procedure Laterality Date    CARDIAC SURGERY      CATARACT REMOVAL Bilateral      SECTION      3 times    CORONARY ANGIOPLASTY WITH STENT PLACEMENT      stenting twice    EYE SURGERY      HIATAL HERNIA REPAIR      late     HIP SURGERY Right 2/3/2023    RIGHT FEMUR IM NAIL WITH INTERTAN performed by Adrian Rocha MD at [facility], [facility] [address] ([address])      in     LARYNGOSCOPY N/A 3/22/2022    SUSPENSON LARYNGOSCOPY WITH JET VENT, DILATION performed by Virgie Marroquin MD at [address] N/A 3/28/2022    SUSPENSION MICROLARYNGOSCOPY WITH BALLOON DILATION AND JET VENT, KENALOG INJECTION performed by Virgie Marroquin MD at [address] N/A 2022    Suspension Laryngoscopy  Lateral of Right True Vocal Cord, With Steroid Injection of Larynx And Tracheostomy Tube Change, debridement performed by Jeanette Schneider MD at [address] N/A 8/10/2022    TRANSORAL LARYNGOPLASTY WITH LEFT PARTIAL ARYTENODECTOMY AND POSS LATERALIZATION, ADVANCEMENT FLAP RECONSTRUCTION WITH JET VENT,THERAPUTIC BRONCHOSCOPY WITH DEBRIEDMENT,WITH BALLOON DILITATION performed by Jeanette Schneider MD at [address] 8/31/2022    Laryngoscopy with Dilation Debridement  Bronchoscopy with Dilation & Resection of Obstructing Tissues Transoral Laryngoplasty Left True Vocal Fold Lateralization left partial aryteniodectomy performed by Sandra Solis MD at [address] [facility] [address]      in [address] 4/1/2022    TRACHEOTOMY TUBE REPLACEMENT performed by Lindsay David MD at [place] [facility]       Immunization History: There is no immunization history on file for this patient.     Active Problems:  Patient Active Problem List   Diagnosis Code    COVID-19 U07.1    Hypoxia R09.02    Acute respiratory failure with hypoxia (HCC) J96.01    Debility R53.81    Physical deconditioning R53.81    History of COVID-19 Z86.16    Dysarthria R47.1    Essential hypertension I10    Type 2 diabetes mellitus with insulin therapy ([facility]) E11.9, Z79.4    Morbid obesity with BMI of 40.0-44.9, adult ([facility]) E66.01, Z68.41    History of coronary artery disease Z86.79    History of coronary artery stent placement Z95.5    Cardiomyopathy ([facility] I42.9    Critical illness myopathy G72.81    Stridor R06.1    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    Dyspnea and respiratory abnormalities R06.00, R06.89    Acute respiratory failure (HCC) J96.00    Acute hypoxemic respiratory failure ([facility]) J96.01    Tracheostomy in place [facility]) Z93.0    Hoarseness R49.0    Unstable angina (HCC) I20.0    Chronic respiratory failure with hypoxia ([facility]) J96.11    Acute chest pain R07.9    Coronary artery disease involving native heart I25.10    Acute on chronic anemia D64.9    Anxiety disorder F41.9    Gastroesophageal reflux disease K21.9    Other tracheostomy complication ([address] Z38.76    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    S/P tracheoplasty Z98.890    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 ([address] E08.59    Elevated serum creatinine R79.89    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    Airway compromise J98.8    Thrombocytopenia, unspecified ([address] D69.6    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 Pulmonary embolism on right ([facility]) I26.99    Nausea and vomiting R11.2    Abdominal bloating R14.0    Deep vein thrombosis (DVT) of both lower extremities ([facility]) I82.403    Hypotension I95.9    Sinus tachycardia R00.0    Leukocytosis D72.829    Bilateral leg edema R60.0    Hyperlipidemia E78.5    PE (pulmonary thromboembolism) ([facility]) I26.99    Acute heart failure, unspecified heart failure type ([facility]) I50.9    Leg swelling M79.89    Closed displaced intertrochanteric fracture of right femur ([facility]) S72.141A    Chronic bilateral low back pain without sciatica M54.50, G89.29    Compression of lumbar vertebra ([facility] S32.000A    Closed intertrochanteric fracture, right, initial encounter ([facility] S72.141A    Anemia associated with acute blood loss D62    Osteoporosis of lumbar spine M81.0       Isolation/Infection:   Isolation            No Isolation          Patient Infection Status       Infection Onset Added Last Indicated Last Indicated By Review Planned Expiration Resolved Resolved By    None active    Resolved    COVID-19 (Rule Out) 01/21/23 01/21/23 01/21/23 COVID-19 & Influenza Combo (Ordered)   01/21/23 Rule-Out Test Resulted    COVID-19 12/17/21 12/19/21 12/17/21 COVID-19   01/06/22 Marlen Alfonso, RN    +12/17, admit 12/18 - 80%            Nurse Assessment:  Last Vital Signs: /75   Pulse 87   Temp 98.8 °F (37.1 °C)   Resp 17   Ht 5' 2\" (1.575 m)   Wt 227 lb 9 oz (103.2 kg)   LMP  (LMP Unknown)   SpO2 100%   BMI 41.62 kg/m²     Last documented pain score (0-10 scale): Pain Level: 7  Last Weight:   Wt Readings from Last 1 Encounters:   03/02/23 227 lb 9 oz (103.2 kg)     Mental Status:  oriented and alert    IV Access:  - None    Nursing Mobility/ADLs:  Walking   Dependent  Transfer  Assisted  Bathing  Assisted  Dressing  Assisted  Toileting  Assisted  Feeding  Independent  Med Admin  Assisted  Med Delivery   whole and prefers mixed with pudding or A/S takes 1 at a time, break large pills in half    Wound Care Documentation and Therapy:  Wound 02/09/23 Buttocks Left;Right DTI with small opening on L buttocks (Active)   Wound Image   02/26/23 2330   Wound Etiology Other 03/01/23 2019   Wound Cleansed Soap and water 03/01/23 2019   Dressing/Treatment Triad hydro/zinc oxide-based hydrophilic paste 12/20/84 4810   Wound Length (cm) 1.5 cm 02/26/23 2330   Wound Width (cm) 1.5 cm 02/26/23 2330   Wound Surface Area (cm^2) 2.25 cm^2 02/26/23 2330   Change in Wound Size % (l*w) 0 02/26/23 2330   Wound Assessment Purple/maroon;Pink/red;Bleeding 03/01/23 2019   Drainage Amount None 03/01/23 2019   Drainage Description Serosanguinous 02/11/23 1556   Odor None 03/01/23 2019   Francie-wound Assessment Dry/flaky; Excoriated 03/01/23 2019   Margins Unattached edges 03/01/23 2019   Number of days: 21       Wound 02/20/23 Abdomen Anterior;Mid Open bleeding wound under mid abdominal fold (Active)   Wound Image   02/26/23 2330   Wound Etiology Skin Tear 03/01/23 2019   Wound Cleansed Soap and water 03/01/23 2019   Dressing/Treatment Pharmaceutical agent (see MAR) 03/01/23 2019   Wound Length (cm) 0.5 cm 02/20/23 0810   Wound Width (cm) 0.5 cm 02/20/23 0810   Wound Surface Area (cm^2) 0.25 cm^2 02/20/23 0810   Wound Assessment Pink/red 03/01/23 2019   Drainage Amount Scant 03/01/23 2019   Drainage Description Thin 03/01/23 2019   Odor None 03/01/23 2019   Francie-wound Assessment Blanchable erythema 03/01/23 2019   Margins Defined edges 03/01/23 2019   Number of days: 10       Wound 02/23/23 Thigh Anterior;Right (Active)   Wound Image   02/26/23 2330   Wound Etiology Pressure Stage 1 03/01/23 2019   Wound Cleansed Soap and water 03/01/23 2019   Dressing/Treatment Moisture barrier 03/01/23 2019   Wound Length (cm) 2 cm 02/23/23 2214   Wound Width (cm) 0.5 cm 02/23/23 2214   Wound Depth (cm) 0 cm 02/23/23 2214   Wound Surface Area (cm^2) 1 cm^2 02/23/23 2214   Wound Volume (cm^3) 0 cm^3 02/23/23 2214   Wound Assessment Pink/red 03/01/23 2019   Drainage Amount None 03/01/23 2019   Odor None 03/01/23 2019   Number of days: 6       Incision 02/03/23 Thigh Anterior;Proximal;Right (Active)   Wound Image   02/26/23 2337   Dressing Status Clean;Dry; Intact 02/28/23 2131   Dressing Change Due 02/18/23 02/17/23 1712   Incision Cleansed Other (Comment) 03/01/23 2019   Dressing/Treatment Open to air 03/01/23 2019   Incision Length (cm) 5.5 02/26/23 2337   Incision Width (cm) 0.5 cm 02/26/23 2337   Closure Steri-Strips 03/01/23 2019   Margins Approximated 03/01/23 2019   Incision Assessment Dry 03/01/23 2019   Drainage Amount None 03/01/23 2019   Drainage Description Sanguinous 02/15/23 2100   Odor None 03/01/23 2019   Francie-incision Assessment Blanchable erythema 02/22/23 1823   Number of days: 26        Elimination:  Continence: Bowel: Can be continent but will just go if no one gets there fast enough. Bladder: werner  Urinary Catheter: Indication for Use of Catheter: Urology/Urologist seeing this patient or inserted indwelling catheter  inserted by Urology on 2/2/23 14 FR  Colostomy/Ileostomy/Ileal Conduit: No       Date of Last BM: 3/2/23    Intake/Output Summary (Last 24 hours) at 3/2/2023 1034  Last data filed at 3/2/2023 0226  Gross per 24 hour   Intake --   Output 1100 ml   Net -1100 ml     I/O last 3 completed shifts:  In: -   Out: 3200 [Urine:3200]    Safety Concerns:     History of Falls (last 30 days) and At Risk for Falls    Impairments/Disabilities:      Trach- can cover and speak but usually pretty easy to read her lips    Nutrition Therapy:  Current Nutrition Therapy:   - Oral Diet:  Carb Control 4 carbs/meal (1800kcals/day)    Routes of Feeding: Oral  Liquids: Thin Liquids  Daily Fluid Restriction: yes - amount 2000ml  Last Modified Barium Swallow with Video (Video Swallowing Test): not done    Treatments at the Time of Hospital Discharge:   Respiratory Treatments: Trach care Q12. Change trach monthly or PRN.  Last changed 02/16/2023. Oxygen Therapy:   5LO2 via trach mask with humidification. FiO2 28%. Ventilator:    - No ventilator support    Trach supplies for care include: peroxide, split guaze, saline, short q-tips, collars. Tach: 5VF51I cuffless  Inner cannula: 1XI51 (letter I; not a number 1). Rehab Therapies: Physical Therapy, Occupational Therapy, and Speech/Language Therapy  Weight Bearing Status/Restrictions: No weight bearing restrictions  Other Medical Equipment (for information only, NOT a DME order):  wheelchair, walker, bedside commode, and hospital bed  Other Treatments: breathing tx, needs oxygen 5-7L with humidification for trach mask    Patient's personal belongings (please select all that are sent with patient):  Spouse has patients belongings with him, missing a dress and patient relations aware. RN SIGNATURE:  Electronically signed by Kiera Rivera LPN on 7/3/57 at 28:04 PM EST    CASE MANAGEMENT/SOCIAL WORK SECTION    Inpatient Status Date: 01/21/2023 to 02/10/2023 Acute Hospitalization. 02/10/2023 to 03/02/2023 Acute Inpatient Rehabilitation Hospitalization.      Readmission Risk Assessment Score:  Readmission Risk              Risk of Unplanned Readmission:  47           Discharging to Facility/ Agency   Name: [facility]  Address: [address] [place] [address], [address] [place] [address], [address] [place] [address]  Phone: [phone]  Fax: [phone]    / signature: Electronically signed by KIARRA Higgins on 3/2/23 at 10:51 AM EST    PHYSICIAN SECTION    Prognosis: Good    Condition at Discharge: Stable    Rehab Potential (if transferring to Rehab): Good    Recommended Labs or Other Treatments After Discharge: Daily physical therapy, Occupational Therapy and speech therapy    Physician Certification: I certify the above information and transfer of Emilee Turner  is necessary for the continuing treatment of the diagnosis listed and that she requires Santiago Melvinuel for less 30 days.     Update Admission H&P: No change in H&P    PHYSICIAN SIGNATURE:  Electronically signed by Dallas Unger MD on 3/2/23 at 10:34 AM EST

## 2023-03-02 NOTE — PROGRESS NOTES
[facility]  DISCHARGE NOTE    TIME: Skilled ST services not completed this date given discharge to SNF. Date: 3/2/2023  Patient Name: Kimberly Wiggins      CSN: [id number]   : 1950  (81 y.o.)  Gender: female   Referring Physician:  Dee Espino MD  Diagnosis: Closed intertrochanteric fracture, right,  initial encounter  Precautions: Fall risk  Current Diet: Regular and Thin Liquids   Swallowing Strategies: Standard Universal Swallow Precautions  Date of Last MBS/FEES: Not Applicable    Short-Term Goals:  SHORT TERM GOAL #1:  Goal 1: Patient will complete functional problem solving and reasoning tasks with 80% accuracy and min cuing in order to improve completion of ADLs/IADLs at discharge. - GOAL MET  INTERVENTIONS: Not addressed this date d/t discharge to SNF  [address] [facility] in [facility]  Patient with overall good execution of game this date. Patient with decreased visual scanning and attention to detail throughout game; improved success given min cues.     Complex Money Management - Making Change  12/15 independent, 3/15 given min cues    *patient with good math computations via pen/paper; required min cuing to check amounts with independent self correction after cuing  *overall good success with task this date    [address] #2:  Goal 2: Patient will complete functional immediate, working, and delayed recall tasks of 6+ units of information, with or without use of trained recall strategies, with 85% accuracy given min cues to improve retention of pertinent medical information. - GOAL MET  INTERVENTIONS: Not addressed this date d/t discharge to SNF  PRIOR SESSION  Recall of Novel Card Game - [address] [employer] in [facility]  (Name, setup, how to play, how to win)  Delayed (~1 day): / independent    SHORT TERM GOAL #3:  Goal 3: Patient will complete functional thought organization and sequencing exercises with 90% accuracy and min cuing  in order to improve  completion  of  ADLs/IADLs. - GOAL NOT MET  INTERVENTIONS: Not addressed this date d/t discharge to SNF  PRIOR SESSION  Sequencing 5 step ADL/IADLs  4/ independent,  given min cues,  given mod verbal cues     *patient with decreased attention to detail; required cuing to check other steps; self correction after given cue with good reasoning for sequencing  *patient with increased time to complete task this date    SHORT TERM GOAL #4:  Goal 4: Patient will complete complex sustained, selective, alternating, and divided attention tasks with no more than three  errors/redirections in five minutes/one task in order to allow for safe return to driving at discharge. - GOAL MET  INTERVENTIONS: Not addressed this date d/t discharge to SNF  PRIOR SESSION  Divided Attention  ST flipping one card from deck at a time. Patient prompted to say \"elephant\" for even cards, \"octopus\" for odd cards, and \"lehman\" for face cards. *patient required max cues to recall corresponding animal to given card  *patient with x5 errors with x4 self corrections in 6:25 minutes  *patient with increased time to complete task this date d/t increased fatigue  *patient with good utilization of self-talk throughout task    Long-Term Goals:  Time Frame for Long Term Goals: 3 weeks    LONG TERM GOAL #1:  Goal 1: Patient will improve cognitive  functioning  to a level of modified independent in order to allow for safe return to PLOF.  GOAL NOT MET       Comprehension: 5 - Patient understands basic needs (hungry/hot/pain)  Expression: 5 - Expresses basic ideas/needs only (hungry/hot/pain)  Social Interaction: 5 - Patient is appropriate with supervision/cues  Problem Solvin - Patient solves simple/routine tasks 75-90%+   Memory: 5 - Patient requires prompting with stress/unfamiliar situations    EDUCATION:  Learner: Patient  Education:  Reviewed ST goals and Plan of Care and Education Related to [employer] and Wellness  Evaluation of Education: [employer] understanding, Demonstrates with assistance, and Needs further instruction    ASSESSMENT/PLAN:   Summary: Patient has met 3/4 STGs and 0/1 LTGs this reporting period. Patient has made gains in recall, thought organization, sequencing, and attention. Patient continues to demonstrate deficits in complex executive functioning and mental manipulation, and often requires cuing for attention to detail within medication, money, and time management tasks. Expressive and receptive language [facility]. Speech and voice WFL with utilization of PMV. Patient consuming a regular texture diet and thin liquids. At this time, patient would benefit from skilled ST services via SNF upon discharge to further address aforementioned deficits in order to safely discharge home and resume ADL/IADL completion with least amount of supervision/assistance. Activity Tolerance:  Patient tolerance of  treatment: good. Assessment/Plan: Patient discharged from Speech Therapy at this time due to discharge from [facility]. Discharge Disposition: SNF. Continued Speech Therapy Services recommended: Yes    Discharge Recommendations: SNF     Jenny CARSON  Clinician

## 2023-03-02 NOTE — PROGRESS NOTES
Transportation:   Has transportation kept you from medical appointments, meetings, work, or from getting things needed for daily living? (Check all that apply)  No.        Health Literacy:   How often do you need to have someone help you when you read instructions, pamphlets, or other written material from your doctor or pharmacy? 1. - Rarely     Social Isolation:  How often do you feel lonely or isolated from those around you? 2. Sometimes              Patient Mood Interview (PHQ-2 to 9) (from [employer]©)   Say to Patient: \"Over the last 2 weeks, have you been bothered by any of the following problems? \"   If symptom is present, enter 1 (yes) in column 1 (Symptom Presence)  If yes in column 1, then ask the patient: About how often have you been bothered by this?   Read and show the patient a card with the symptom frequency choices. Indicate response in column 2, Symptom Frequency. Symptom Presence  No (enter 0 in column 2)   Yes (enter 0-3 in column 2)  9. No response (leave column 2 blank) Symptom Frequency  Never or 1 day  2-6 days (several days)  7-11 days (half or more of the days)  12-14 days (nearly every day     Symptom Presence Symptom Frequency   Little interest or pleasure in doing things 1. Yes 2. 7-11 days (half or more of the days)   Feeling down, depressed, or hopeless 0. No 0. Never or 1 day              If either A or B above has symptom frequency coded 2 or 3, CONTINUE asking questions below. If not, END the interview   Trouble falling or staying asleep, or sleeping too much 1. Yes 2. 7-11 days (half or more of the days)   Feeling tired or having little energy 1. Yes 3. 12-14 days (nearly every day)   Poor appetite or overeating 1. Yes 3. 12-14 days (nearly every day)   Feeling bad about yourself-or that you are a failure or have let yourself or your family down 0. No 0. Never or 1 day   Trouble concentrating on things, such as reading the newspaper or watching television 1. Yes 3. 12-14 days (nearly every day)    Moving or speaking so slowly that other people could have noticed. Or the opposite-being so fidgety or restless that you have been moving around a lot more than usual 0. No 0. Never or 1 day   Thoughts that you would be better off dead, or of hurting yourself in some way 0. No 0.  Never or 1 day

## 2023-03-02 NOTE — PROGRESS NOTES
[facility]  INPATIENT PHYSICAL THERAPY  DISCHARGE NOTE  [facility] - 7E-55/055-A    Time In: 3058  Time Out: 1315  Timed Code Treatment Minutes: 23 Minutes  Minutes: 23          Date: 3/2/2023  Patient Name: Princess Marks,  Gender:  female        MRN: [id number]  : 1950  (81 y.o.)     Referring Practitioner: Chanel Solares MD  Diagnosis: closed intertrochanteric fx, Rt  Additional Pertinent Hx: Omar Shankar is a 73yoF with PMH of recent PE and b/l DVT, macrocytic anemia, CAD, DM2, tracheostomy dependent, obesity, and HTN who presents for leg swelling, nausea, and constipation. She was recently discharged after an 8 day admission for DVTs and PEs. She was placed on Xarelto when discharged and stated that she has been compliant and only missed one dose. She returned due to chronic constipation, feeling ''bloated'', nausea, and LE edema. While being evaluated in the ED the pt had a mechanical fall and developed a R femur fracture. She was found to have supratheraputic INR at 3.83. CT head and C-spine both clear. Pt is s/p R femur ORIF by Dr Katelynn Reilly . Pt found to have acute fractures of L2-S1 likely 2/2 osteoporosis, ok for activity as tolerated per Dr Mcclure . Prior Level of Function:  Lives With: Spouse  Type of Home: House  Home Layout: One level  Home Access: Ramped entrance (or 1 step with no rails.)  Home Equipment: Ryann Beck, rolling, Lift chair (Pt sleeps in lift chair and was using it to assist up to stand PTA)   Bathroom Shower/Tub: Walk-in shower  Bathroom Toilet: Standard  Bathroom Equipment: Built-in shower seat, 3-in-1 commode    ADL Assistance: Needs assistance  Homemaking Assistance: Needs assistance  Ambulation Assistance: Independent  Transfer Assistance: Independent  Active : No  Additional Comments: pt amb short distances in the home with RW,  available  and able to assist as needed.  Has a passi valve that she uses at rest.    Restrictions/Precautions:  Restrictions/Precautions: General Precautions, Fall Risk, Weight Bearing  Right Lower Extremity Weight Bearing: Weight Bearing As Tolerated  Position Activity Restriction  Other position/activity restrictions: Tracheostomy/collar 6L. With venturi mask for out of room use 8 liters portable O2. SUBJECTIVE: Pt resting in bed. Ready for discharge to SNF. Word received that pt and spouse refused to private pay for an ambulette to transport pt to the SNF, therefore Therapists assisted with vehicle transfer into the [facility]. PAIN: 0/10:     Vitals: Vitals not assessed per clinical judgement, see nursing flowsheet    OBJECTIVE:  Bed Mobility:  Supine to Sit: Stand By Assistance, with head of bed raised, with rail    Transfers:  Sit to Stand: [employer], from bed. From w/c with 1 foot on 6\" step and other on ground, min +2  Stand to [place] [address], into w/[facility] transfer: Initial attempt was made using the NanoICE device. Unfortunately the seat pads were unable to be opened wide enough to allow for the pt to sit back on the vehicle seat. A combination of +2 mod, +2 min needed to accomplish the transfer with pt's Lt leg on the 6\" step to get up to stand from the w/c. Mod +2, min +1 to get RLE onto the step and turn in order to sit on the seat. Another min assist needed in the vehicle to insure buttocks were far enough on the seat. ASSESSMENT:  Assessment: Patient progressing toward established goals. Activity Tolerance:  Patient tolerance of  treatment: good. Equipment Recommendations:Equipment Needed: No  Other: Pt has RW, manual w/c and mechanical lift device  Plan: Discharge to SNF for continued skilled care. Goals:  Patient Goals : get around o my own without the RW  Short Term Goals  Time Frame for Short Term Goals: 1 wk  Short Term Goal 1: Pt will go from supine <->sit, mod +1 to get in/out of bed.   MET, see LTG  Short Term Goal 2: Pt will get up/down from bed, into JEANNETTE stedy device, +1 mod assist to progress to up to RW GOAL MET, SEE LTG  Short Term Goal 3: Pt will  the JEANNETTE stedy device, +1 min assist x5 min, to progress to standing with RW MET, see LTG  Short Term Goal 4: Pt will propel w/c >= 50 ft, tile surface, CGA for home mobility. GOAL MET, SEE LTG  Short Term Goal 5: Pt to transfer sit <--> stand min A for increased functional mobility. MET, see LTG  Long Term Goals  Time Frame for Long Term Goals : 3 wks from evaluation. Long Term Goal 1: Pt to go supine <->sit, min +1 to get in/out of bed, bed flat, no rail. MET  Long Term Goal 2: Pt to get up/down from bed or w/c +1 contact guard assist, consistently, to get up to walk. MET  Long Term Goal 3: Pt to perform stand/pivot transfer with RW, +1 contact guard assist to get in/out of w/c. MET  Long Term Goal 4: Pt to walk with RW >= 10 ft, mn +1 to progress to home mobility  Long Term Goal 5: Pt to propel w/c >= 50 ft, Mod I, for home mobility MET  Additional Goals?: Yes  Long term goal 6: Pt to get in/out of car, min +1 for transportation needs. NOT MET,    Following session, patient left in safe position with all fall risk precautions in place.

## 2023-03-02 NOTE — PROGRESS NOTES
RCP verified to staff for info for the nursing home that pt currently has a 6UN75H cuffless trach in currently.

## 2023-03-02 NOTE — PROGRESS NOTES
[address] [place] [address]  INPATIENT PHYSICAL THERAPY  DAILY NOTE  [facility]- [address] - 7E-55/055-A    Time In: 8359  Time Out: 0383  Timed Code Treatment Minutes: 30 Minutes  Minutes: 30          Date: 3/2/2023  Patient Name: Reyes Ruffing,  Gender:  female        MRN: [id number]  : 1950  (81 y.o.)     Referring Practitioner: Paz Goldberg MD  Diagnosis: closed intertrochanteric fx, Rt  Additional Pertinent Hx: Ludmila Viera is a 73yoF with PMH of recent PE and b/l DVT, macrocytic anemia, CAD, DM2, tracheostomy dependent, obesity, and HTN who presents for leg swelling, nausea, and constipation. She was recently discharged after an 8 day admission for DVTs and PEs. She was placed on Xarelto when discharged and stated that she has been compliant and only missed one dose. She returned due to chronic constipation, feeling ''bloated'', nausea, and LE edema. While being evaluated in the ED the pt had a mechanical fall and developed a R femur fracture. She was found to have supratheraputic INR at 3.83. CT head and C-spine both clear. Pt is s/p R femur ORIF by Dr Leeanna Trimble . Pt found to have acute fractures of L2-S1 likely 2/2 osteoporosis, ok for activity as tolerated per Dr Daisha Hendrix . Prior Level of Function:  Lives With: Spouse  Type of Home: House  Home Layout: One level  Home Access: Ramped entrance (or 1 step with no rails.)  Home Equipment: emil Syed, Lift chair (Pt sleeps in lift chair and was using it to assist up to stand PTA)   Bathroom Shower/Tub: Walk-in shower  Bathroom Toilet: Standard  Bathroom Equipment: Built-in shower seat, 3-in-1 commode    ADL Assistance: Needs assistance  Homemaking Assistance: Needs assistance  Ambulation Assistance: Independent  Transfer Assistance: Independent  Active : No  Additional Comments: pt amb short distances in the home with RW,  available  and able to assist as needed.  Has a passi valve that she uses at rest.    Restrictions/Precautions:  Restrictions/Precautions: General Precautions, Fall Risk, Weight Bearing  Right Lower Extremity Weight Bearing: Weight Bearing As Tolerated  Position Activity Restriction  Other position/activity restrictions: Tracheostomy/collar 6L. With venturi mask for out of room use 8 liters portable O2. SUBJECTIVE: Pt resting in bed. Pleasant and cooperative. Pt stated her blood sugar was running low and preferred to stay in bed. PAIN: none reported during session./10:     Vitals: Vitals not assessed per clinical judgement, see nursing flowsheet    OBJECTIVE:  Bed Mobility:  Rolling to Left: Stand By Assistance, with rail, with verbal cues , to assume hooklying BLEs prior to start of roll. Once cued, Mod I. Transfers:  Not Tested      Exercise:   Exercises were completed for increased independence with functional mobility. Abdominal isometrics, margarita hip ADD isometrics, BUE diagonals to ea direction with LEs maintained in hooklying, modified bridges with BUEs across chest and BLEs on bolster, margarita scapular retraction x10 reps ea for improved stability with functional mobility. EXERCISE REPS/SETS   Glut Sets 10/1   Quads Sets 10/1 BLEs with cues for increased m. Activation margarita   Ankle Pumps 10/1   Heel Slides 10/1RLE with CGA   Short Arc Quads 10/2 BLEs   Long Arc Quads    Hip ABDuction 10/1 RLE with CGA   Straight Leg Raise    Hamstring Curls    Hamstring Curls with Theraband             Functional Outcome Measures: Not completed       ASSESSMENT:  Assessment: Patient progressing toward established goals. Activity Tolerance:  Patient tolerance of  treatment: good.       Equipment Recommendations:Equipment Needed: No  Other: Pt has RW, manual w/c and mechanical lift device  Discharge Recommendations: Patient would benefit from continued PT at discharge  Plan: Current Treatment Recommendations: Strengthening, Balance training, Functional mobility training, Transfer training, Endurance training, Equipment evaluation, education, & procurement, Patient/Caregiver education & training, Safety education & training, Therapeutic activities, Home exercise program, Wheelchair mobility training, Gait training, Pain management  General Plan:  (5x/ wk 90 min)    Patient Education  Patient Education: Home Exercise Program, Bed Mobility,  - Patient Verbalized Understanding, - Patient Requires Continued Education    Goals:  Patient Goals : get around o my own without the RW  Short Term Goals  Time Frame for Short Term Goals: 1 wk  Short Term Goal 1: Pt will go from supine <->sit, mod +1 to get in/out of bed. MET, see LTG  Short Term Goal 2: Pt will get up/down from bed, into JEANNETTE stedy device, +1 mod assist to progress to up to RW GOAL MET, SEE LTG  Short Term Goal 3: Pt will  the JEANNETTE stedy device, +1 min assist x5 min, to progress to standing with RW MET, see LTG  Short Term Goal 4: Pt will propel w/c >= 50 ft, tile surface, CGA for home mobility. GOAL MET, SEE LTG  Short Term Goal 5: Pt to transfer sit <--> stand min A for increased functional mobility. MET, see LTG  Long Term Goals  Time Frame for Long Term Goals : 3 wks from evaluation. Long Term Goal 1: Pt to go supine <->sit, min +1 to get in/out of bed, bed flat, no rail. Long Term Goal 2: Pt to get up/down from bed or w/c +1 contact guard assist, consistently, to get up to walk. Long Term Goal 3: Pt to perform stand/pivot transfer with RW, +1 contact guard assist to get in/out of w/c. Long Term Goal 4: Pt to walk with RW >= 10 ft, mn +1 to progress to home mobility  Long Term Goal 5: Pt to propel w/c >= 50 ft, Mod I, for home mobility  Additional Goals?: Yes  Long term goal 6: Pt to get in/out of car, min +1 for transportation needs. Following session, patient left in safe position with all fall risk precautions in place.

## 2023-03-03 NOTE — PROGRESS NOTES
SW notified Albert Darling at [facility] and Joelle Alarcon at [facility] that patient has chosen another facility.

## 2023-03-09 ENCOUNTER — TELEPHONE (OUTPATIENT)
Dept: CARDIOLOGY CLINIC | Age: 73
End: 2023-03-09

## 2023-03-09 NOTE — TELEPHONE ENCOUNTER
Patient no showed to chf clinic. Called to , said he called sometime this week to cancel.  Will call back at a later date to r/s

## 2023-03-10 PROBLEM — G72.81 CRITICAL ILLNESS MYOPATHY: Status: RESOLVED | Noted: 2022-01-14 | Resolved: 2023-03-10

## 2023-03-10 PROBLEM — I50.9 ACUTE HEART FAILURE, UNSPECIFIED HEART FAILURE TYPE (HCC): Status: RESOLVED | Noted: 2023-02-02 | Resolved: 2023-03-10

## 2023-03-10 PROBLEM — Z98.890 S/P TRACHEOPLASTY: Status: RESOLVED | Noted: 2022-08-10 | Resolved: 2023-03-10

## 2023-03-10 PROBLEM — I95.9 HYPOTENSION: Status: RESOLVED | Noted: 2023-01-21 | Resolved: 2023-03-10

## 2023-03-10 PROBLEM — J98.8 AIRWAY COMPROMISE: Status: RESOLVED | Noted: 2022-08-31 | Resolved: 2023-03-10

## 2023-03-10 PROBLEM — R06.89 DYSPNEA AND RESPIRATORY ABNORMALITIES: Status: RESOLVED | Noted: 2022-03-27 | Resolved: 2023-03-10

## 2023-03-10 PROBLEM — U07.1 COVID-19: Status: RESOLVED | Noted: 2021-12-18 | Resolved: 2023-03-10

## 2023-03-10 PROBLEM — Z93.6 OTHER ARTIFICIAL OPENINGS OF URINARY TRACT STATUS (HCC): Status: ACTIVE | Noted: 2023-03-10

## 2023-03-10 PROBLEM — R79.89 ELEVATED SERUM CREATININE: Status: RESOLVED | Noted: 2022-08-11 | Resolved: 2023-03-10

## 2023-03-10 PROBLEM — R00.0 SINUS TACHYCARDIA: Status: RESOLVED | Noted: 2023-01-21 | Resolved: 2023-03-10

## 2023-03-10 PROBLEM — Z93.6 OTHER ARTIFICIAL OPENINGS OF URINARY TRACT STATUS (HCC): Status: RESOLVED | Noted: 2023-03-10 | Resolved: 2023-03-10

## 2023-03-10 PROBLEM — J96.00 ACUTE RESPIRATORY FAILURE (HCC): Status: RESOLVED | Noted: 2022-04-13 | Resolved: 2023-03-10

## 2023-03-10 PROBLEM — J96.01 ACUTE HYPOXEMIC RESPIRATORY FAILURE (HCC): Status: RESOLVED | Noted: 2022-04-13 | Resolved: 2023-03-10

## 2023-03-10 PROBLEM — R47.1 DYSARTHRIA: Status: RESOLVED | Noted: 2022-01-14 | Resolved: 2023-03-10

## 2023-03-10 PROBLEM — R06.00 DYSPNEA AND RESPIRATORY ABNORMALITIES: Status: RESOLVED | Noted: 2022-03-27 | Resolved: 2023-03-10

## 2023-03-10 PROBLEM — I50.9 CONGESTIVE HEART FAILURE (HCC): Status: ACTIVE | Noted: 2023-03-10

## 2023-03-10 PROBLEM — I20.0 UNSTABLE ANGINA (HCC): Status: RESOLVED | Noted: 2022-06-06 | Resolved: 2023-03-10

## 2023-03-10 PROBLEM — D72.829 LEUKOCYTOSIS: Status: RESOLVED | Noted: 2023-01-21 | Resolved: 2023-03-10

## 2023-03-13 ENCOUNTER — OFFICE VISIT (OUTPATIENT)
Dept: UROLOGY | Age: 73
End: 2023-03-13
Payer: MEDICARE

## 2023-03-13 ENCOUNTER — TELEPHONE (OUTPATIENT)
Dept: UROLOGY | Age: 73
End: 2023-03-13

## 2023-03-13 VITALS — WEIGHT: 227 LBS | HEIGHT: 62 IN | BODY MASS INDEX: 41.77 KG/M2 | RESPIRATION RATE: 16 BRPM

## 2023-03-13 DIAGNOSIS — Z46.6 INDWELLING CATHETER REPLACED: Primary | ICD-10-CM

## 2023-03-13 PROCEDURE — 3017F COLORECTAL CA SCREEN DOC REV: CPT

## 2023-03-13 PROCEDURE — 1090F PRES/ABSN URINE INCON ASSESS: CPT

## 2023-03-13 PROCEDURE — G8400 PT W/DXA NO RESULTS DOC: HCPCS

## 2023-03-13 PROCEDURE — 1036F TOBACCO NON-USER: CPT

## 2023-03-13 PROCEDURE — G8484 FLU IMMUNIZE NO ADMIN: HCPCS

## 2023-03-13 PROCEDURE — 1123F ACP DISCUSS/DSCN MKR DOCD: CPT

## 2023-03-13 PROCEDURE — G8417 CALC BMI ABV UP PARAM F/U: HCPCS

## 2023-03-13 PROCEDURE — 99214 OFFICE O/P EST MOD 30 MIN: CPT

## 2023-03-13 PROCEDURE — 1111F DSCHRG MED/CURRENT MED MERGE: CPT

## 2023-03-13 PROCEDURE — G8427 DOCREV CUR MEDS BY ELIG CLIN: HCPCS

## 2023-03-13 PROCEDURE — 51702 INSERT TEMP BLADDER CATH: CPT

## 2023-03-13 NOTE — PROGRESS NOTES
Patient forgot speaking component so unable to talk, has someone with to help answer questions given permission by patient or can answer yes or no questions herself, said no to writing board.

## 2023-03-13 NOTE — PROGRESS NOTES
Patient:  Ayan Ramos  YOB: 1950  Date: 3/13/2023    HISTORY OF PRESENT ILLNESS:   The patient is a 81 y.o. female who presents today for evaluation of the following problems: Hospital follow-up, discussed Werner exchange versus removal      3/13/23   Patient was seen in the hospital by Jabari Lester CNP on 2/3/2023. She was admitted for leg swelling and n/v. Urology consulted for Acute on possibly chronic urinary retention-pt reports hx of urinary retention in the past.  Recent UTIs, difficult to treat. Possible chronic component. Werner placed with some difficulty secondary to soft tissue swelling and positioning from R hip fx. Given difficulty of placement and concern for possible chronic component recommend continuing werner catheter until patient mobile, having good BMs, diuresis completed and soft tissue swelling improved. Patient is poor historian, has difficulty speaking. Presents with family  She is staying in a rehab facility currently   Mobility limited secondary to R femur fracture   She notes hx of  x 3 and hysterectomy but no prior urologic surgeries. Does not follow with urologist routinely. Overall the problem(s) : are improving. No fevers or chills. No nausea or vomiting. CT abdomen and pelvis WO contrast 2023   1. Pericolonic inflammatory changes around the sigmoid colon and descending colon with diverticula relate to acute diverticulitis. 2. A sinus tract is seen connecting the right femoral greater trochanter to the skin. This is only partially imaged on this study but direct visualization is recommended. Further imaging may be required. 3. A 7 mm right lung base pulmonary nodule. 4. Acute on chronic compression fractures of L2, L3, L4 and L5. Marked osteopenia. 5. Other findings as described above. Urinalysis today:  No results found for this visit on 23.     Last BUN and creatinine:  Lab Results   Component Value Date BUN 16 2023     Lab Results   Component Value Date    CREATININE 0.7 2023       PAST MEDICAL, FAMILY AND SOCIAL HISTORY UPDATE:  Past Medical History:   Diagnosis Date    Arthritis     Coronary artery disease     COVID     Diabetic neuropathy ([facility]     Bilateral feet numbness    DVT (deep venous thrombosis) ([facility] 2023    Right lower extremity    Glottic stenosis     3/2022    Hyperlipidemia     Lower back pain     With diagnosis of having \"stress fracture\" by ortho at [facility]    Primary hypertension     Pulmonary embolism ([facility] 2023    Right lower lobe    Type 2 diabetes mellitus ([facility] [address] [facility]      Past Surgical History:   Procedure Laterality Date    CARDIAC SURGERY      CATARACT REMOVAL Bilateral      SECTION      3 times    CORONARY ANGIOPLASTY WITH STENT PLACEMENT      stenting twice    EYE SURGERY      HIATAL HERNIA REPAIR      late     HIP SURGERY Right 2/3/2023    RIGHT FEMUR IM NAIL WITH INTERTAN performed by Yobani Otto MD at [address] [place] [address], [address] [facility] ([address])      in     LARYNGOSCOPY N/A 3/22/2022    SUSPENSON LARYNGOSCOPY WITH JET VENT, DILATION performed by Jhony Rocha MD at [address] N/A 3/28/2022    SUSPENSION MICROLARYNGOSCOPY WITH BALLOON DILATION AND JET VENT, KENALOG INJECTION performed by Jhony Rocha MD at [address] N/A 2022    Suspension Laryngoscopy  Lateral of Right True Vocal Cord, With Steroid Injection of Larynx And Tracheostomy Tube Change, debridement performed by Carlos Marcano MD at [address] N/A 8/10/2022    TRANSORAL LARYNGOPLASTY WITH LEFT PARTIAL ARYTENODECTOMY AND POSS LATERALIZATION, ADVANCEMENT FLAP RECONSTRUCTION WITH JET VENT,THERAPUTIC BRONCHOSCOPY WITH DEBRIEDMENT,WITH BALLOON DILITATION performed by Carlos Marcano MD at [address] N/A 2022    Laryngoscopy with Dilation Debridement  Bronchoscopy with Dilation & Resection of Obstructing Tissues Transoral Laryngoplasty Left True Vocal Fold Lateralization left partial aryteniodectomy performed by Airam Zamudio MD at [address]      in [address] 4/1/2022    TRACHEOTOMY TUBE REPLACEMENT performed by Zain Guzman MD at [facility] History   Problem Relation Age of Onset    Parkinson's Disease Father     Lung Cancer Father      No outpatient medications have been marked as taking for the 3/13/23 encounter (Appointment) with Max No PA-C. Metformin and related, Codeine, Meperidine hcl, Sulfa antibiotics, and Sumatriptan  Social History     Tobacco Use   Smoking Status Never   Smokeless Tobacco Never       Social History     Substance and Sexual Activity   Alcohol Use Not Currently    Comment: drinks 1 glass of wine cooler or wine about 3 times per year       REVIEW OF SYSTEMS:  Constitutional: negative  Eyes: negative  Respiratory: negative  Cardiovascular: negative  Gastrointestinal: negative  Genitourinary: negative except for what is in HPI  Musculoskeletal: negative  Skin: negative   Neurological: negative  Hematological/Lymphatic: negative  Psychological: negative    Physical Exam:    There were no vitals filed for this visit. Patient is a 81 y.o. female in no acute distress and alert and oriented to person, place and time. NAD, Alert  Non labored respiration  Normal peripheral pulses  Soft, non tender  Skin-warm and dry  Psych- normal mood and affect    Assessment and Plan   Urinary Retention   Diarrhea- patient having diarrhea. Currently getting workup. Scheduled to have colonoscopy on April 10, 2023.       -Werner placed with some difficulty while in the hospital secondary to soft tissue swelling and positioning from R hip fx by WYATT Yuan - CNP  -Patient wants to leave werner in place at this time due to concerns of of mobility and urinary retention.  Discussed that urinary retention may be chronic.  -If unable to void if/when werner catheter is removed, will schedule for cystoscopy with physician     -Follow up in 1 month to discuss continued use of catheter     Mikaela Bowman PA-C   Urology

## 2023-03-13 NOTE — PATIENT INSTRUCTIONS
Follow up in 1 month to discuss continued use of catheter   Call with questions, comments, or concerns. I recommend going to the ED for further evaluation if you develop fever, chills, nausea, vomiting, chest pain, SOB, or calf pain.

## 2023-03-13 NOTE — PROGRESS NOTES
Patient has given me verbal consent to perform catheter change Yes    Does patient have latex allergy? No  Does patient have shellfish or betadine allergy? No      Following WYATT Kim plan of care. 14 Fr Catheter changed without difficulty. Once balloon was deflated, removed catheter without difficulty. Cleansed urethral opening with betadine swab. 14 Fr regular werner was inserted using sterile water-soluble lubricant without difficulty. Catheter was flushed with 35cc water ensuring return and inflated balloon with 10 ml of water. Werner Catheter was hooked up to overnight bag with straps. Patient instructed on how to drain catheter bags. If patient was given a leg bag, patient was instructed to keep bag above the knee to prevent pulling on catheter. Pt notified if catheter is pulled on may cause pain and bleeding. Patient was also instructed on how to switch from leg bag to overnight bag.           Supplies were provided by office

## 2023-04-25 ENCOUNTER — OFFICE VISIT (OUTPATIENT)
Dept: ENT CLINIC | Age: 73
End: 2023-04-25
Payer: MEDICARE

## 2023-04-25 VITALS
HEART RATE: 131 BPM | OXYGEN SATURATION: 92 % | HEIGHT: 62 IN | TEMPERATURE: 97.3 F | SYSTOLIC BLOOD PRESSURE: 126 MMHG | RESPIRATION RATE: 18 BRPM | BODY MASS INDEX: 37.54 KG/M2 | WEIGHT: 204 LBS | DIASTOLIC BLOOD PRESSURE: 74 MMHG

## 2023-04-25 DIAGNOSIS — Z93.0 TRACHEOSTOMY DEPENDENCE (HCC): Primary | ICD-10-CM

## 2023-04-25 DIAGNOSIS — J95.03 TRACHEAL STENOSIS DUE TO TRACHEOSTOMY (HCC): ICD-10-CM

## 2023-04-25 DIAGNOSIS — J38.6 POSTERIOR GLOTTIC STENOSIS: ICD-10-CM

## 2023-04-25 DIAGNOSIS — R06.1 STRIDOR: ICD-10-CM

## 2023-04-25 PROCEDURE — 3074F SYST BP LT 130 MM HG: CPT | Performed by: OTOLARYNGOLOGY

## 2023-04-25 PROCEDURE — G8417 CALC BMI ABV UP PARAM F/U: HCPCS | Performed by: OTOLARYNGOLOGY

## 2023-04-25 PROCEDURE — 1123F ACP DISCUSS/DSCN MKR DOCD: CPT | Performed by: OTOLARYNGOLOGY

## 2023-04-25 PROCEDURE — 99215 OFFICE O/P EST HI 40 MIN: CPT | Performed by: OTOLARYNGOLOGY

## 2023-04-25 PROCEDURE — G8427 DOCREV CUR MEDS BY ELIG CLIN: HCPCS | Performed by: OTOLARYNGOLOGY

## 2023-04-25 PROCEDURE — G8400 PT W/DXA NO RESULTS DOC: HCPCS | Performed by: OTOLARYNGOLOGY

## 2023-04-25 PROCEDURE — 1036F TOBACCO NON-USER: CPT | Performed by: OTOLARYNGOLOGY

## 2023-04-25 PROCEDURE — 31622 DX BRONCHOSCOPE/WASH: CPT | Performed by: OTOLARYNGOLOGY

## 2023-04-25 PROCEDURE — 1090F PRES/ABSN URINE INCON ASSESS: CPT | Performed by: OTOLARYNGOLOGY

## 2023-04-25 PROCEDURE — 3078F DIAST BP <80 MM HG: CPT | Performed by: OTOLARYNGOLOGY

## 2023-04-25 PROCEDURE — 3017F COLORECTAL CA SCREEN DOC REV: CPT | Performed by: OTOLARYNGOLOGY

## 2023-04-25 RX ORDER — TIZANIDINE 4 MG/1
TABLET ORAL
COMMUNITY
Start: 2023-04-18

## 2023-04-25 NOTE — PROGRESS NOTES
nightly For insomnia 30 tablet 2    insulin glargine (LANTUS SOLOSTAR) 100 UNIT/ML injection pen Inject 20 Units into the skin nightly 5 Adjustable Dose Pre-filled Pen Syringe 3    diclofenac sodium (VOLTAREN) 1 % GEL Apply 2 g topically 4 times daily as needed for Pain 916 g 3    folic acid (FOLVITE) 1 MG tablet Take 1 tablet by mouth daily 30 tablet 3    Multiple Vitamin (MULTIVITAMIN) TABS tablet Take 1 tablet by mouth daily 30 tablet 3    sucralfate (CARAFATE) 1 GM tablet Take 1 tablet by mouth 4 times daily (before meals and nightly) 120 tablet 3    D3-50 1.25 MG (84620 UT) CAPS TAKE 1 CAPSULE BY MOUTH ONCE A WEEK      pantoprazole (PROTONIX) 40 MG tablet TAKE 1 BY MOUTH ONCE DAILY BEFORE A MEAL      nystatin (MYCOSTATIN) 096418 UNIT/GM powder APPLY POWDER TOPICALLY TWICE DAILY UNDER BOTH BREASTS      Insulin Pen Needle 31G X 6 MM MISC 1 each by Does not apply route daily 100 each 3    Cholecalciferol (VITAMIN D3 PO) Take 50,000 Units by mouth every 30 days 4000 units      guaiFENesin 400 MG tablet Take 1 tablet by mouth 2 times daily as needed for Cough      metoprolol succinate (TOPROL XL) 25 MG extended release tablet Take 1 tablet by mouth daily 30 tablet 3    rosuvastatin (CRESTOR) 10 MG tablet Take 1 tablet by mouth daily      aspirin 81 MG chewable tablet Take 1 tablet by mouth daily      lipase-protease-amylase (CREON) 10255-02999 units delayed release capsule Take 1 capsule by mouth 3 times daily (with meals) 180 capsule 2    dicyclomine (BENTYL) 10 MG capsule Take 1 capsule by mouth 4 times daily (before meals and nightly) 120 capsule 2    potassium chloride (KLOR-CON M) 20 MEQ extended release tablet Take 1 tablet by mouth 2 times daily for 2 doses 180 tablet 1     No current facility-administered medications for this visit.      Past Medical History:   Diagnosis Date    Arthritis     Coronary artery disease     COVID     Diabetic neuropathy ([facility] 2021    Bilateral feet numbness    DVT (deep venous

## 2023-04-26 ENCOUNTER — TELEPHONE (OUTPATIENT)
Dept: CARDIOLOGY CLINIC | Age: 73
End: 2023-04-26

## 2023-04-26 NOTE — TELEPHONE ENCOUNTER
Jose Manuel Phillip was seen in the office yesterday 4/25/23 and we did end up rescheduling her surgery with Dr Daron Payne on 5/11/23. We are requesting cardiac clearance. She was previously scheduled for this clearance in February but she had to cancel because she was hospitalized for a different reason. Can you reschedule her to be seen and/or cleared prior to surgery 5/11/23? Surgery:  Laryngoscopy with dilation, debridement and steroid injections, Bronchoscopy with release of stenosis    Please advise. Thank you!

## 2023-04-28 ENCOUNTER — OFFICE VISIT (OUTPATIENT)
Dept: CARDIOLOGY CLINIC | Age: 73
End: 2023-04-28
Payer: MEDICARE

## 2023-04-28 VITALS
HEIGHT: 62 IN | DIASTOLIC BLOOD PRESSURE: 68 MMHG | HEART RATE: 68 BPM | BODY MASS INDEX: 37.73 KG/M2 | WEIGHT: 205 LBS | SYSTOLIC BLOOD PRESSURE: 110 MMHG

## 2023-04-28 DIAGNOSIS — Z01.810 PREOP CARDIOVASCULAR EXAM: Primary | ICD-10-CM

## 2023-04-28 PROCEDURE — 3074F SYST BP LT 130 MM HG: CPT | Performed by: INTERNAL MEDICINE

## 2023-04-28 PROCEDURE — 1036F TOBACCO NON-USER: CPT | Performed by: INTERNAL MEDICINE

## 2023-04-28 PROCEDURE — 3078F DIAST BP <80 MM HG: CPT | Performed by: INTERNAL MEDICINE

## 2023-04-28 PROCEDURE — 99214 OFFICE O/P EST MOD 30 MIN: CPT | Performed by: INTERNAL MEDICINE

## 2023-04-28 PROCEDURE — G8417 CALC BMI ABV UP PARAM F/U: HCPCS | Performed by: INTERNAL MEDICINE

## 2023-04-28 PROCEDURE — 1090F PRES/ABSN URINE INCON ASSESS: CPT | Performed by: INTERNAL MEDICINE

## 2023-04-28 PROCEDURE — 1123F ACP DISCUSS/DSCN MKR DOCD: CPT | Performed by: INTERNAL MEDICINE

## 2023-04-28 PROCEDURE — 3017F COLORECTAL CA SCREEN DOC REV: CPT | Performed by: INTERNAL MEDICINE

## 2023-04-28 PROCEDURE — G8400 PT W/DXA NO RESULTS DOC: HCPCS | Performed by: INTERNAL MEDICINE

## 2023-04-28 PROCEDURE — G8427 DOCREV CUR MEDS BY ELIG CLIN: HCPCS | Performed by: INTERNAL MEDICINE

## 2023-04-28 NOTE — PROGRESS NOTES
[address] [facility] [address]  [place] [address]  Dept: [phone]  Dept Fax: [phone]  Loc: [phone]    Visit Date: 4/28/2023    Ms. Antony Jeff is a 81 y.o. female  who presented for:    HPI:   80 yo CAD s/p PCI, COVID1-9 in 12/2021, s/p tracheostomy and respiratory failure, DM, HTN, HLD is here for preop risk stratification for laryngeal surgery. Used to follow up with cardiologist at [facility]. She was recently in hospital underwent Echo and stress, both were within normal limits.           Current Outpatient Medications:     SENNA CO, by Combination route, Disp: , Rfl:     lipase-protease-amylase (CREON) 94863-85261 units delayed release capsule, Take 1 capsule by mouth 3 times daily (with meals), Disp: , Rfl:     tiZANidine (ZANAFLEX) 4 MG tablet, , Disp: , Rfl:     insulin glargine (LANTUS SOLOSTAR) 100 UNIT/ML injection pen, Inject 20 Units into the skin nightly, Disp: 5 Adjustable Dose Pre-filled Pen Syringe, Rfl: 3    diclofenac sodium (VOLTAREN) 1 % GEL, Apply 2 g topically 4 times daily as needed for Pain, Disp: 150 g, Rfl: 3    Multiple Vitamin (MULTIVITAMIN) TABS tablet, Take 1 tablet by mouth daily, Disp: 30 tablet, Rfl: 3    sucralfate (CARAFATE) 1 GM tablet, Take 1 tablet by mouth 4 times daily (before meals and nightly), Disp: 120 tablet, Rfl: 3    D3-50 1.25 MG (12509 UT) CAPS, TAKE 1 CAPSULE BY MOUTH ONCE A WEEK, Disp: , Rfl:     pantoprazole (PROTONIX) 40 MG tablet, TAKE 1 BY MOUTH ONCE DAILY BEFORE A MEAL, Disp: , Rfl:     nystatin (MYCOSTATIN) 610782 UNIT/GM powder, APPLY POWDER TOPICALLY TWICE DAILY UNDER BOTH BREASTS, Disp: , Rfl:     Cholecalciferol (VITAMIN D3 PO), Take 50,000 Units by mouth every 30 days 4000 units, Disp: , Rfl:     metoprolol succinate (TOPROL XL) 25 MG extended release tablet, Take 1 tablet by mouth daily, Disp: 30 tablet, Rfl: 3    rosuvastatin (CRESTOR) 10 MG tablet, Take 1 tablet by mouth

## 2023-05-04 ENCOUNTER — PREP FOR PROCEDURE (OUTPATIENT)
Dept: ENT CLINIC | Age: 73
End: 2023-05-04

## 2023-05-08 NOTE — PROGRESS NOTES
Reviewed home medications with . Spouse indicates that patient hasn't been feeling well - recently was tx for Cdiff in [address] Pt also has protein deficiency. Spouse states she's not eating much and he hasn't been giving her Lantus at night. PAT questioned if surgeon aware and  stated yes - and that patient was cleared for surgery by Dr. Nelly Durán (4/28/23). Called and spoke with SELAM Serrano of Dr. Constantino Berg - and informed her of this information. Maggie will relay to surgeon.

## 2023-05-10 RX ORDER — SODIUM CHLORIDE 9 MG/ML
INJECTION, SOLUTION INTRAVENOUS PRN
Status: CANCELLED | OUTPATIENT
Start: 2023-05-10

## 2023-05-10 RX ORDER — SODIUM CHLORIDE 0.9 % (FLUSH) 0.9 %
5-40 SYRINGE (ML) INJECTION PRN
Status: CANCELLED | OUTPATIENT
Start: 2023-05-10

## 2023-05-10 RX ORDER — SODIUM CHLORIDE 0.9 % (FLUSH) 0.9 %
5-40 SYRINGE (ML) INJECTION EVERY 12 HOURS SCHEDULED
Status: CANCELLED | OUTPATIENT
Start: 2023-05-10

## 2023-05-11 ENCOUNTER — ANESTHESIA EVENT (OUTPATIENT)
Dept: OPERATING ROOM | Age: 73
DRG: 144 | End: 2023-05-11
Payer: MEDICARE

## 2023-05-11 ENCOUNTER — HOSPITAL ENCOUNTER (INPATIENT)
Age: 73
LOS: 1 days | Discharge: HOME | DRG: 144 | End: 2023-05-11
Attending: OTOLARYNGOLOGY | Admitting: OTOLARYNGOLOGY
Payer: MEDICARE

## 2023-05-11 ENCOUNTER — TELEPHONE (OUTPATIENT)
Dept: ENT CLINIC | Age: 73
End: 2023-05-11

## 2023-05-11 ENCOUNTER — ANESTHESIA (OUTPATIENT)
Dept: OPERATING ROOM | Age: 73
DRG: 144 | End: 2023-05-11
Payer: MEDICARE

## 2023-05-11 VITALS
HEIGHT: 62 IN | RESPIRATION RATE: 20 BRPM | HEART RATE: 113 BPM | TEMPERATURE: 98.1 F | BODY MASS INDEX: 36.66 KG/M2 | DIASTOLIC BLOOD PRESSURE: 88 MMHG | SYSTOLIC BLOOD PRESSURE: 111 MMHG | OXYGEN SATURATION: 96 % | WEIGHT: 199.2 LBS

## 2023-05-11 PROBLEM — Z93.0 DEPENDENCE ON TRACHEOSTOMY (HCC): Status: ACTIVE | Noted: 2023-05-11

## 2023-05-11 LAB — GLUCOSE BLD STRIP.AUTO-MCNC: 150 MG/DL (ref 70–108)

## 2023-05-11 PROCEDURE — 31529 LARYNGOSCOPY AND DILATION: CPT | Performed by: OTOLARYNGOLOGY

## 2023-05-11 PROCEDURE — 2580000003 HC RX 258: Performed by: OTOLARYNGOLOGY

## 2023-05-11 PROCEDURE — 7100000011 HC PHASE II RECOVERY - ADDTL 15 MIN: Performed by: OTOLARYNGOLOGY

## 2023-05-11 PROCEDURE — 82948 REAGENT STRIP/BLOOD GLUCOSE: CPT

## 2023-05-11 PROCEDURE — 6360000002 HC RX W HCPCS: Performed by: OTOLARYNGOLOGY

## 2023-05-11 PROCEDURE — 6360000002 HC RX W HCPCS: Performed by: REGISTERED NURSE

## 2023-05-11 PROCEDURE — 2500000003 HC RX 250 WO HCPCS: Performed by: REGISTERED NURSE

## 2023-05-11 PROCEDURE — 2709999900 HC NON-CHARGEABLE SUPPLY: Performed by: OTOLARYNGOLOGY

## 2023-05-11 PROCEDURE — 0CJS8ZZ INSPECTION OF LARYNX, VIA NATURAL OR ARTIFICIAL OPENING ENDOSCOPIC: ICD-10-PCS | Performed by: OTOLARYNGOLOGY

## 2023-05-11 PROCEDURE — 31528 LARYNGOSCOPY AND DILATION: CPT | Performed by: NURSE PRACTITIONER

## 2023-05-11 PROCEDURE — 7100000010 HC PHASE II RECOVERY - FIRST 15 MIN: Performed by: OTOLARYNGOLOGY

## 2023-05-11 PROCEDURE — 2720000010 HC SURG SUPPLY STERILE: Performed by: OTOLARYNGOLOGY

## 2023-05-11 PROCEDURE — 0B718ZZ DILATION OF TRACHEA, VIA NATURAL OR ARTIFICIAL OPENING ENDOSCOPIC: ICD-10-PCS | Performed by: OTOLARYNGOLOGY

## 2023-05-11 PROCEDURE — 7100000001 HC PACU RECOVERY - ADDTL 15 MIN: Performed by: OTOLARYNGOLOGY

## 2023-05-11 PROCEDURE — 3600000004 HC SURGERY LEVEL 4 BASE: Performed by: OTOLARYNGOLOGY

## 2023-05-11 PROCEDURE — APPNB45 APP NON BILLABLE 31-45 MINUTES: Performed by: NURSE PRACTITIONER

## 2023-05-11 PROCEDURE — 6360000002 HC RX W HCPCS

## 2023-05-11 PROCEDURE — 7100000000 HC PACU RECOVERY - FIRST 15 MIN: Performed by: OTOLARYNGOLOGY

## 2023-05-11 PROCEDURE — 1200000000 HC SEMI PRIVATE

## 2023-05-11 PROCEDURE — 3700000000 HC ANESTHESIA ATTENDED CARE: Performed by: OTOLARYNGOLOGY

## 2023-05-11 PROCEDURE — 3600000014 HC SURGERY LEVEL 4 ADDTL 15MIN: Performed by: OTOLARYNGOLOGY

## 2023-05-11 PROCEDURE — C1726 CATH, BAL DIL, NON-VASCULAR: HCPCS | Performed by: OTOLARYNGOLOGY

## 2023-05-11 PROCEDURE — 3700000001 HC ADD 15 MINUTES (ANESTHESIA): Performed by: OTOLARYNGOLOGY

## 2023-05-11 RX ORDER — SODIUM CHLORIDE 0.9 % (FLUSH) 0.9 %
5-40 SYRINGE (ML) INJECTION EVERY 12 HOURS SCHEDULED
Status: DISCONTINUED | OUTPATIENT
Start: 2023-05-11 | End: 2023-05-11 | Stop reason: HOSPADM

## 2023-05-11 RX ORDER — EPINEPHRINE 1 MG/ML
INJECTION, SOLUTION, CONCENTRATE INTRAVENOUS PRN
Status: DISCONTINUED | OUTPATIENT
Start: 2023-05-11 | End: 2023-05-11 | Stop reason: ALTCHOICE

## 2023-05-11 RX ORDER — FENTANYL CITRATE 50 UG/ML
INJECTION, SOLUTION INTRAMUSCULAR; INTRAVENOUS PRN
Status: DISCONTINUED | OUTPATIENT
Start: 2023-05-11 | End: 2023-05-11 | Stop reason: SDUPTHER

## 2023-05-11 RX ORDER — SODIUM CHLORIDE 9 MG/ML
INJECTION, SOLUTION INTRAVENOUS PRN
Status: DISCONTINUED | OUTPATIENT
Start: 2023-05-11 | End: 2023-05-11 | Stop reason: HOSPADM

## 2023-05-11 RX ORDER — SODIUM CHLORIDE 0.9 % (FLUSH) 0.9 %
5-40 SYRINGE (ML) INJECTION PRN
Status: DISCONTINUED | OUTPATIENT
Start: 2023-05-11 | End: 2023-05-11 | Stop reason: HOSPADM

## 2023-05-11 RX ORDER — ROCURONIUM BROMIDE 10 MG/ML
INJECTION, SOLUTION INTRAVENOUS PRN
Status: DISCONTINUED | OUTPATIENT
Start: 2023-05-11 | End: 2023-05-11 | Stop reason: SDUPTHER

## 2023-05-11 RX ORDER — ONDANSETRON 2 MG/ML
4 INJECTION INTRAMUSCULAR; INTRAVENOUS
Status: DISCONTINUED | OUTPATIENT
Start: 2023-05-11 | End: 2023-05-11 | Stop reason: HOSPADM

## 2023-05-11 RX ORDER — DROPERIDOL 2.5 MG/ML
0.62 INJECTION, SOLUTION INTRAMUSCULAR; INTRAVENOUS ONCE
Status: COMPLETED | OUTPATIENT
Start: 2023-05-11 | End: 2023-05-11

## 2023-05-11 RX ORDER — DEXAMETHASONE SODIUM PHOSPHATE 10 MG/ML
10 INJECTION, EMULSION INTRAMUSCULAR; INTRAVENOUS
Status: COMPLETED | OUTPATIENT
Start: 2023-05-11 | End: 2023-05-11

## 2023-05-11 RX ORDER — CIPROFLOXACIN 500 MG/1
500 TABLET, FILM COATED ORAL 2 TIMES DAILY
Qty: 14 TABLET | Refills: 0 | Status: SHIPPED | OUTPATIENT
Start: 2023-05-11 | End: 2023-05-11 | Stop reason: HOSPADM

## 2023-05-11 RX ORDER — PROPOFOL 10 MG/ML
INJECTION, EMULSION INTRAVENOUS PRN
Status: DISCONTINUED | OUTPATIENT
Start: 2023-05-11 | End: 2023-05-11 | Stop reason: SDUPTHER

## 2023-05-11 RX ORDER — ONDANSETRON 2 MG/ML
INJECTION INTRAMUSCULAR; INTRAVENOUS PRN
Status: DISCONTINUED | OUTPATIENT
Start: 2023-05-11 | End: 2023-05-11 | Stop reason: SDUPTHER

## 2023-05-11 RX ORDER — DIPHENHYDRAMINE HYDROCHLORIDE 50 MG/ML
12.5 INJECTION INTRAMUSCULAR; INTRAVENOUS
Status: DISCONTINUED | OUTPATIENT
Start: 2023-05-11 | End: 2023-05-11 | Stop reason: HOSPADM

## 2023-05-11 RX ORDER — DROPERIDOL 2.5 MG/ML
INJECTION, SOLUTION INTRAMUSCULAR; INTRAVENOUS
Status: COMPLETED
Start: 2023-05-11 | End: 2023-05-11

## 2023-05-11 RX ORDER — FENTANYL CITRATE 50 UG/ML
50 INJECTION, SOLUTION INTRAMUSCULAR; INTRAVENOUS EVERY 5 MIN PRN
Status: DISCONTINUED | OUTPATIENT
Start: 2023-05-11 | End: 2023-05-11 | Stop reason: HOSPADM

## 2023-05-11 RX ORDER — AMOXICILLIN 500 MG/1
500 CAPSULE ORAL 2 TIMES DAILY
Qty: 20 CAPSULE | Refills: 0 | Status: SHIPPED | OUTPATIENT
Start: 2023-05-11 | End: 2023-05-18

## 2023-05-11 RX ADMIN — PROPOFOL 50 MG: 10 INJECTION, EMULSION INTRAVENOUS at 12:57

## 2023-05-11 RX ADMIN — DEXAMETHASONE SODIUM PHOSPHATE 10 MG: 10 INJECTION, EMULSION INTRAMUSCULAR; INTRAVENOUS at 11:11

## 2023-05-11 RX ADMIN — DROPERIDOL 0.62 MG: 2.5 INJECTION, SOLUTION INTRAMUSCULAR; INTRAVENOUS at 15:00

## 2023-05-11 RX ADMIN — PROPOFOL 150 MCG/KG/MIN: 10 INJECTION, EMULSION INTRAVENOUS at 12:53

## 2023-05-11 RX ADMIN — FENTANYL CITRATE 100 MCG: 50 INJECTION, SOLUTION INTRAMUSCULAR; INTRAVENOUS at 13:17

## 2023-05-11 RX ADMIN — ROCURONIUM BROMIDE 20 MG: 10 INJECTION INTRAVENOUS at 13:55

## 2023-05-11 RX ADMIN — PIPERACILLIN SODIUM,TAZOBACTAM SODIUM 3375 MG: 3; .375 INJECTION, POWDER, FOR SOLUTION INTRAVENOUS at 13:10

## 2023-05-11 RX ADMIN — ONDANSETRON 4 MG: 2 INJECTION INTRAMUSCULAR; INTRAVENOUS at 13:25

## 2023-05-11 RX ADMIN — ROCURONIUM BROMIDE 50 MG: 10 INJECTION INTRAVENOUS at 13:05

## 2023-05-11 RX ADMIN — SUGAMMADEX 200 MG: 100 INJECTION, SOLUTION INTRAVENOUS at 14:33

## 2023-05-11 RX ADMIN — ROCURONIUM BROMIDE 30 MG: 10 INJECTION INTRAVENOUS at 13:19

## 2023-05-11 RX ADMIN — SUGAMMADEX 200 MG: 100 INJECTION, SOLUTION INTRAVENOUS at 14:29

## 2023-05-11 RX ADMIN — PROPOFOL 50 MG: 10 INJECTION, EMULSION INTRAVENOUS at 12:59

## 2023-05-11 RX ADMIN — SODIUM CHLORIDE: 9 INJECTION, SOLUTION INTRAVENOUS at 14:25

## 2023-05-11 RX ADMIN — SODIUM CHLORIDE: 9 INJECTION, SOLUTION INTRAVENOUS at 11:05

## 2023-05-11 ASSESSMENT — PAIN - FUNCTIONAL ASSESSMENT: PAIN_FUNCTIONAL_ASSESSMENT: 0-10

## 2023-05-11 ASSESSMENT — PAIN SCALES - GENERAL
PAINLEVEL_OUTOF10: 3
PAINLEVEL_OUTOF10: 2

## 2023-05-11 NOTE — ANESTHESIA PRE PROCEDURE
03/02/2023 06:00 AM    K 3.1 03/02/2023 06:00 AM    K 3.4 02/28/2023 05:00 PM    CL 99 03/02/2023 06:00 AM    CO2 25 03/02/2023 06:00 AM    BUN 16 03/02/2023 06:00 AM    CREATININE 0.7 03/02/2023 06:00 AM    LABGLOM >60 03/02/2023 06:00 AM    GLUCOSE 51 03/02/2023 06:00 AM    PROT 5.0 02/15/2023 11:15 PM    CALCIUM 8.5 03/02/2023 06:00 AM    BILITOT 0.4 02/15/2023 11:15 PM    ALKPHOS 131 02/15/2023 11:15 PM    AST 14 02/15/2023 11:15 PM    ALT <5 02/15/2023 11:15 PM       POC Tests: No results for input(s): POCGLU, POCNA, POCK, POCCL, POCBUN, POCHEMO, POCHCT in the last 72 hours. Coags:   Lab Results   Component Value Date/Time    INR 2.88 02/08/2023 11:28 PM    APTT 53.3 02/01/2023 10:50 PM       HCG (If Applicable): No results found for: PREGTESTUR, PREGSERUM, HCG, HCGQUANT     ABGs: No results found for: PHART, PO2ART, YZQ8PUD, WSE7TFP, BEART, B9FKTZMJ     Type & Screen (If Applicable):  Lab Results   Component Value Date    LABRH POS 02/06/2023       Drug/Infectious Status (If Applicable):  No results found for: HIV, HEPCAB    COVID-19 Screening (If Applicable):   Lab Results   Component Value Date/Time    COVID19 NOT  DETECTED 03/02/2023 09:20 AM    COVID19 NOT DETECTED 01/21/2023 04:25 AM           Anesthesia Evaluation   no history of anesthetic complications:   Airway: Mallampati: II  TM distance: >3 FB   Neck ROM: full  Mouth opening: > = 3 FB  Tracheostomy present Dental:          Pulmonary:normal exam        (-) COPD and asthma                          ROS comment: On very low O2 at home (0.125L) via trach mask   Cardiovascular:    (+) hypertension:, CAD:,     (-) past MI             ROS comment: Echo 1/23/23  Normal left ventricle size and systolic function. Ejection fraction was   estimated at 60 to 65 %. There were no regional left ventricular wall   motion abnormalities and wall thickness was within normal limits.    Doppler parameters were consistent with abnormal left ventricular   relaxation

## 2023-05-11 NOTE — DISCHARGE INSTRUCTIONS
- Resume regular diet. PLENTY of protein - search on the Internet, you can find many ways to use protein that makes it taste much better.   - Resume regular activities  - Tylenol or ibuprofen for discomfort

## 2023-05-11 NOTE — ANESTHESIA POSTPROCEDURE EVALUATION
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     Patricia Phase I: Patricia Score: 9    Patricai Phase II:        Anesthesia Post Evaluation    Patient location during evaluation: PACU  Patient participation: complete - patient participated  Level of consciousness: awake and alert  Airway patency: patent  Nausea & Vomiting: no vomiting and no nausea  Complications: no  Cardiovascular status: hemodynamically stable  Respiratory status: acceptable and T-piece  Hydration status: stable

## 2023-05-11 NOTE — H&P
Adapted from prior ENT note:    Tracheostomy dependence; Tracheal stenosis;  Posterior glottic stenosis; Stridor  No new symptoms    Past Medical History:   Diagnosis Date    Arthritis     Coronary artery disease     COVID     Diabetic neuropathy ([address]     Bilateral feet numbness    DVT (deep venous thrombosis) ([address] 2023    Right lower extremity    Glottic stenosis     3/2022    Hyperlipidemia     Lower back pain     With diagnosis of having \"stress fracture\" by ortho at [facility]    Primary hypertension     Pulmonary embolism ([facility] 2023    Right lower lobe    Type 2 diabetes mellitus ([address]        Past Surgical History:   Procedure Laterality Date    CARDIAC SURGERY      CATARACT REMOVAL Bilateral      SECTION      3 times    CORONARY ANGIOPLASTY WITH STENT PLACEMENT      stenting twice    EYE SURGERY      HIATAL HERNIA REPAIR      late     HIP SURGERY Right 2/3/2023    RIGHT FEMUR IM NAIL WITH INTERTAN performed by Elisa Parks MD at [address] [place] [address], [address] [facility])      in [place] [address] 3/22/2022    [address] [facility] 165 JET VENT, DILATION performed by Estefanía Walter MD at [address] N/A 3/28/2022    SUSPENSION MICROLARYNGOSCOPY WITH BALLOON DILATION AND JET VENT, KENALOG INJECTION performed by Estefanía Walter MD at [address] N/A 2022    Suspension Laryngoscopy  Lateral of Right True Vocal Cord, With Steroid Injection of Larynx And Tracheostomy Tube Change, debridement performed by Joy Russell MD at [address] N/A 8/10/2022    TRANSORAL LARYNGOPLASTY WITH LEFT PARTIAL ARYTENODECTOMY AND POSS LATERALIZATION, ADVANCEMENT FLAP RECONSTRUCTION WITH JET VENT,THERAPUTIC BRONCHOSCOPY WITH DEBRIEDMENT,WITH BALLOON DILITATION performed by Joy Russell MD at [address] N/A 2022    Laryngoscopy with Dilation Debridement  Bronchoscopy with Dilation & Resection of

## 2023-05-11 NOTE — PROGRESS NOTES
Pt has met discharge criteria and states she is ready for discharge to home. IV removed, gauze and tape applied. Dressed in own clothes and personal belongings gathered. Discharge instructions  given to pt and family; pt and family verbalized understanding of discharge instructions, prescriptions and follow up appointments. Pt transported to discharge lobby by [facility] staff.

## 2023-05-11 NOTE — PROGRESS NOTES
Pt returned to [facility] room 10. Vitals and assessment as charted. 0.9 infusing, @950ml to count from PACU. Pt has broth. Family at the bedside. Pt and family verbalized understanding of discharge criteria and call light use. Call light in reach.

## 2023-05-11 NOTE — PROGRESS NOTES
Pt admitted to [facility] room 10 and oriented to unit. SCD sleeves applied. Nares swabbed. Pt verbalized permission for first name, last initial and physicians name on white board. SDS board and discharge criteria explained, pt and family verbalized understanding. Pt denies thoughts of harming self or others. Call light in reach. Family at the bedside.

## 2023-05-11 NOTE — OP NOTE
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 Diffuse airway edema consistent with patient's anasarca  2. Edema extended into the subglottis but anterior subglottis had the appearance of an A-frame stenosis  3. On palpation, the patient's tracheostomy had clearly eroded through the anterior cricoid into the cricothyroid space  4. Balloon dilation of the larynx and trachea demonstrated a relatively normal aperture from glottis to infra stomal trachea with minimal scar shearing just the dissipation of edema and the separation of the eroded cricoid segments      Detailed Description of Procedure: The patient was taken to the operating room awake and placed in the supine position. General anesthesia was induced and the patient's tracheostomy cannula was changed over to an #6 endotracheal tube without difficulty or vital sign instability. The table was turned and 90 degrees and the patient was draped in the usual fashion for transoral entrance stomal surgery. I performed a timeout verifying the patient's identity and planned procedure. My assistant at surgery then crafted a heat activated maxillary dental guard for the patient and then passed a Constantin laryngoscope into the laryngeal inlet.   This allowed direct

## 2023-05-11 NOTE — PROGRESS NOTES
1451: pt arrives to pacu. Pt on 6L trach mask. Pt responds to verbal stimulation. VSS. Respirations unlabored. Pt complains of being cold  1500: pt given 0.625mg of droperidol  1506: pt blood sugar 150. Pt not given insulin   1511: pt on 3L trach mask  1520: pt resting in bed   1530: pt meets discharge criteria from pacu.  Pt transported to [facility]

## 2023-05-12 ENCOUNTER — TELEPHONE (OUTPATIENT)
Dept: ENT CLINIC | Age: 73
End: 2023-05-12

## 2023-05-12 NOTE — TELEPHONE ENCOUNTER
I received a call from patient's  guarding concern about patient being on antibiotics. She just recovered from C. difficile and he is concerned about potential recurrence with antibiotics. Informed him that an antibiotic can cause C. difficile, but concerns for postop infection in her airway can also be very concerning. I advised taking the antibiotic as prescribed for the time being and contact us if there is concern and change in her bowel movement habits. She is not presently taking any probiotics so I recommended taking as well is eating yogurt. He expressed understanding and thanked me for the information.   He will contact the office with new/worsening symptoms or other concerns

## 2023-07-05 ENCOUNTER — OFFICE VISIT (OUTPATIENT)
Dept: ENT CLINIC | Age: 73
End: 2023-07-05
Payer: MEDICARE

## 2023-07-05 VITALS
SYSTOLIC BLOOD PRESSURE: 136 MMHG | OXYGEN SATURATION: 100 % | BODY MASS INDEX: 28.92 KG/M2 | RESPIRATION RATE: 24 BRPM | WEIGHT: 169.4 LBS | HEART RATE: 80 BPM | DIASTOLIC BLOOD PRESSURE: 78 MMHG | HEIGHT: 64 IN | TEMPERATURE: 97.3 F

## 2023-07-05 DIAGNOSIS — Z93.0 TRACHEOSTOMY DEPENDENCE (HCC): ICD-10-CM

## 2023-07-05 DIAGNOSIS — J95.03 TRACHEAL STENOSIS DUE TO TRACHEOSTOMY (HCC): Primary | ICD-10-CM

## 2023-07-05 DIAGNOSIS — J38.6 POSTERIOR GLOTTIC STENOSIS: ICD-10-CM

## 2023-07-05 PROCEDURE — 3075F SYST BP GE 130 - 139MM HG: CPT | Performed by: OTOLARYNGOLOGY

## 2023-07-05 PROCEDURE — 3017F COLORECTAL CA SCREEN DOC REV: CPT | Performed by: OTOLARYNGOLOGY

## 2023-07-05 PROCEDURE — 1123F ACP DISCUSS/DSCN MKR DOCD: CPT | Performed by: OTOLARYNGOLOGY

## 2023-07-05 PROCEDURE — 3078F DIAST BP <80 MM HG: CPT | Performed by: OTOLARYNGOLOGY

## 2023-07-05 PROCEDURE — G8400 PT W/DXA NO RESULTS DOC: HCPCS | Performed by: OTOLARYNGOLOGY

## 2023-07-05 PROCEDURE — 1090F PRES/ABSN URINE INCON ASSESS: CPT | Performed by: OTOLARYNGOLOGY

## 2023-07-05 PROCEDURE — 1036F TOBACCO NON-USER: CPT | Performed by: OTOLARYNGOLOGY

## 2023-07-05 PROCEDURE — G8427 DOCREV CUR MEDS BY ELIG CLIN: HCPCS | Performed by: OTOLARYNGOLOGY

## 2023-07-05 PROCEDURE — G8417 CALC BMI ABV UP PARAM F/U: HCPCS | Performed by: OTOLARYNGOLOGY

## 2023-07-05 PROCEDURE — 99215 OFFICE O/P EST HI 40 MIN: CPT | Performed by: OTOLARYNGOLOGY

## 2023-07-05 NOTE — PROGRESS NOTES
[facility] [place] [facility]  [facility] EAR, NOSE AND THROAT  [address] [facility]  Dept: [phone]  Dept Fax: [phone]  Loc: [phone]    Kanwal Becerril is a 81 y.o. female who was referred by No ref. provider found for:  Chief Complaint   Patient presents with    Post-Op Check     Patient here for a postop exam.        HPI:     Kanwal Becerril is a 81 y.o. 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 As a result the patient had a tracheostomy placed and today returns for follow-up evaluation with her  after canceling a planned procedure to widen her glottis in preparation for a segmental cricoid tracheal resection in order to hopefully decannulate her. On canceling her scheduled procedure, the patient informed me that she would like to be referred for a second opinion evaluation either at [facility] or the [facility] clinic. I recommended Dr. Brigitte Bacon at the [facility] clinic and offered to put the consult in for her. She returns today having an appointment at [facility] but not having pursued care at the [facility] clinic. Her  was concerned that that would be an onerous distance to have to travel back and forth. That said, the patient reports being able to breathe more comfortably and speak more easily through her tracheostomy speaking valve than she had for many months previously. She apparently had put on a substantial amount of weight associated with limited kidney function and has now succeeded in losing this weight improving her breathing markedly. History:      Allergies   Allergen Reactions    Metformin And Related Other (See Comments)     diarrhea    Codeine Nausea And Vomiting    Meperidine Hcl Nausea And Vomiting    Sulfa Antibiotics Nausea And Vomiting    Sumatriptan Nausea And Vomiting     Current Outpatient Medications   Medication Sig Dispense Refill    CALCIUM

## 2023-07-10 ENCOUNTER — TELEPHONE (OUTPATIENT)
Dept: ENT CLINIC | Age: 73
End: 2023-07-10

## 2023-07-10 NOTE — TELEPHONE ENCOUNTER
ICC from patients  who said that they no longer want a second opinion and would like to proceed with reconstructive surgery with Dr. James Morataya. I spoke with Maggie and she said she would need to talk to Dr. Salmeron Feeling about this. I informed the hospital that we would get more information and Maggie would be calling him back.

## 2023-07-13 NOTE — TELEPHONE ENCOUNTER
[facility] and we scheduled a follow up appointment for 8/7/23.

## 2023-07-19 ENCOUNTER — TELEPHONE (OUTPATIENT)
Dept: ENT CLINIC | Age: 73
End: 2023-07-19

## 2023-07-19 NOTE — TELEPHONE ENCOUNTER
Spoke with Shanti Cardona. Said they decided to not go forward with the second opinion at [facility].  Cancelled referral

## 2023-08-07 ENCOUNTER — OFFICE VISIT (OUTPATIENT)
Dept: ENT CLINIC | Age: 73
End: 2023-08-07
Payer: MEDICARE

## 2023-08-07 VITALS
WEIGHT: 165.5 LBS | BODY MASS INDEX: 28.25 KG/M2 | HEIGHT: 64 IN | DIASTOLIC BLOOD PRESSURE: 86 MMHG | TEMPERATURE: 97.8 F | OXYGEN SATURATION: 97 % | HEART RATE: 107 BPM | RESPIRATION RATE: 20 BRPM | SYSTOLIC BLOOD PRESSURE: 124 MMHG

## 2023-08-07 DIAGNOSIS — Z01.818 PRE-OP TESTING: Primary | ICD-10-CM

## 2023-08-07 DIAGNOSIS — J38.6 POSTERIOR GLOTTIC STENOSIS: ICD-10-CM

## 2023-08-07 DIAGNOSIS — J95.03 TRACHEAL STENOSIS DUE TO TRACHEOSTOMY (HCC): Primary | ICD-10-CM

## 2023-08-07 DIAGNOSIS — Z93.0 TRACHEOSTOMY DEPENDENCE (HCC): ICD-10-CM

## 2023-08-07 DIAGNOSIS — J38.7 CRICOARYTENOID JOINT FIXATION: ICD-10-CM

## 2023-08-07 PROCEDURE — 3017F COLORECTAL CA SCREEN DOC REV: CPT | Performed by: OTOLARYNGOLOGY

## 2023-08-07 PROCEDURE — 1090F PRES/ABSN URINE INCON ASSESS: CPT | Performed by: OTOLARYNGOLOGY

## 2023-08-07 PROCEDURE — 1123F ACP DISCUSS/DSCN MKR DOCD: CPT | Performed by: OTOLARYNGOLOGY

## 2023-08-07 PROCEDURE — G8400 PT W/DXA NO RESULTS DOC: HCPCS | Performed by: OTOLARYNGOLOGY

## 2023-08-07 PROCEDURE — 3079F DIAST BP 80-89 MM HG: CPT | Performed by: OTOLARYNGOLOGY

## 2023-08-07 PROCEDURE — G8417 CALC BMI ABV UP PARAM F/U: HCPCS | Performed by: OTOLARYNGOLOGY

## 2023-08-07 PROCEDURE — 3074F SYST BP LT 130 MM HG: CPT | Performed by: OTOLARYNGOLOGY

## 2023-08-07 PROCEDURE — 1036F TOBACCO NON-USER: CPT | Performed by: OTOLARYNGOLOGY

## 2023-08-07 PROCEDURE — G8427 DOCREV CUR MEDS BY ELIG CLIN: HCPCS | Performed by: OTOLARYNGOLOGY

## 2023-08-07 PROCEDURE — 99215 OFFICE O/P EST HI 40 MIN: CPT | Performed by: OTOLARYNGOLOGY

## 2023-08-07 RX ORDER — ACETAMINOPHEN 500 MG
500 TABLET ORAL EVERY 6 HOURS PRN
COMMUNITY

## 2023-08-07 NOTE — PROGRESS NOTES
[facility] [place] [facility]  [facility] EAR, NOSE AND THROAT  [address]  Dept: [phone]  Dept Fax: [phone]  Loc: [phone]    Adonay Ortiz is a 81 y.o. female who was referred by No ref. provider found for:  Chief Complaint   Patient presents with    Follow-up     Patient here for f/u,scope and to discuss possible surgery       HPI:     Adonay Ortiz is a 81 y.o. female returns for a consultation for a segmental cricoid tracheal resection and removal of her tracheostomy. She had for a substantial mount of time pressed the issue of a second opinion evaluation which I helped her to pursue at the [facility]. Apparently within the last few weeks she has changed her mind and no longer wants to travel to [facility] because she is sure that she would not accept surgery if there even if they had the identical plan to what is being offered to her here. So she returns today to review the planned operation and to have endoscopy if indicated. History:      Allergies   Allergen Reactions    Metformin And Related Other (See Comments)     diarrhea    Codeine Nausea And Vomiting    Meperidine Hcl Nausea And Vomiting    Sulfa Antibiotics Nausea And Vomiting    Sumatriptan Nausea And Vomiting     Current Outpatient Medications   Medication Sig Dispense Refill    acetaminophen (TYLENOL) 500 MG tablet Take 1 tablet by mouth every 6 hours as needed for Pain      CALCIUM GLUCONATE PO Take 600 mg by mouth 2 times daily      SENNA CO by Combination route      lipase-protease-amylase (CREON) 57987-74479 units delayed release capsule Take 1 capsule by mouth 3 times daily (with meals)      tiZANidine (ZANAFLEX) 4 MG tablet       diclofenac sodium (VOLTAREN) 1 % GEL Apply 2 g topically 4 times daily as needed for Pain 150 g 3    sucralfate (CARAFATE) 1 GM tablet Take 1 tablet by mouth 4 times daily (before meals and nightly) 120 tablet 3    Cholecalciferol

## 2023-08-15 ENCOUNTER — TELEPHONE (OUTPATIENT)
Dept: CARDIOLOGY CLINIC | Age: 73
End: 2023-08-15

## 2023-08-15 NOTE — TELEPHONE ENCOUNTER
Diego Kocher is scheduled for surgery with Dr Cathye Severe on 10/12/23. We are requesting cardiac clearance. Surgery:  Cricotracheal Resection and Laryngoplasty    Please advise. Thank you!

## 2023-08-22 NOTE — PROGRESS NOTES
Assessment: This was a follow-up spiritual care encounter as a part of rounds. Patient, Trish Felder, was oriented and alert with family present at bedside. Patient appeared calm. Interventions:   provided empathic listening and facilitated storytelling with patient about her hospital stay and the impact of her illness.  provided prayer and provided information regarding  services and availability. Outcomes:  Patient shared that her healing process seems to be going slowly but that she's coping \"okay. \" Patient named tank and family support as things that are helping her get through this time. Plan:  Spiritual care team will remain available to support patient and family, PRN. [facility]. [address] [place] [address]  [address], [address]  [phone]

## 2023-10-04 ENCOUNTER — PREP FOR PROCEDURE (OUTPATIENT)
Dept: ENT CLINIC | Age: 73
End: 2023-10-04

## 2023-10-09 ENCOUNTER — OFFICE VISIT (OUTPATIENT)
Dept: ENT CLINIC | Age: 73
End: 2023-10-09

## 2023-10-09 VITALS
BODY MASS INDEX: 31.72 KG/M2 | DIASTOLIC BLOOD PRESSURE: 72 MMHG | SYSTOLIC BLOOD PRESSURE: 132 MMHG | RESPIRATION RATE: 20 BRPM | HEART RATE: 87 BPM | TEMPERATURE: 97.2 F | OXYGEN SATURATION: 93 % | HEIGHT: 61 IN | WEIGHT: 168 LBS

## 2023-10-09 DIAGNOSIS — R06.89 DYSPNEA AND RESPIRATORY ABNORMALITIES: ICD-10-CM

## 2023-10-09 DIAGNOSIS — J38.7 CRICOARYTENOID JOINT FIXATION: ICD-10-CM

## 2023-10-09 DIAGNOSIS — R06.00 DYSPNEA AND RESPIRATORY ABNORMALITIES: ICD-10-CM

## 2023-10-09 DIAGNOSIS — J95.03 TRACHEAL STENOSIS DUE TO TRACHEOSTOMY (HCC): Primary | ICD-10-CM

## 2023-10-09 DIAGNOSIS — Z93.0 TRACHEOSTOMY DEPENDENCE (HCC): ICD-10-CM

## 2023-10-09 DIAGNOSIS — J38.6 POSTERIOR GLOTTIC STENOSIS: ICD-10-CM

## 2023-10-09 RX ORDER — CIPROFLOXACIN 750 MG/1
750 TABLET, FILM COATED ORAL 2 TIMES DAILY
Qty: 20 TABLET | Refills: 0 | Status: ON HOLD | OUTPATIENT
Start: 2023-10-09 | End: 2023-10-19

## 2023-10-09 NOTE — PROGRESS NOTES
[facility] [place] [facility]  [facility] EAR, NOSE AND THROAT  [address]  Dept: [phone]  Dept Fax: [phone]  Loc: [phone]    Jair Hough is a 81 y.o. female who was referred by No ref. provider found for:  Chief Complaint   Patient presents with    Pre-op Exam     Patient is here for pre op trach resection. Patient has surgery on 10/12/23. Patient said she is doing well and feeling pretty good. HPI:     Jair Hough is a 81 y.o. 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 The patient returns today with her  as per routine for preop evaluation and consultation to make sure she is ready for her upcoming operation next week. The patient's  reports that she has had misgivings about having her operation but most recently stated to him that she was certain she wanted to go through that because she stated very plainly that she would \"rather die than have to live with tracheostomy the rest of (her) life\". She conveyed to me that she was indeed willing if not eager to go through this operation with the expected result of being without her tracheostomy. History: Allergies   Allergen Reactions    Metformin And Related Other (See Comments)     diarrhea    Morphine Hallucinations    Codeine Nausea And Vomiting    Meperidine Hcl Nausea And Vomiting    Sulfa Antibiotics Nausea And Vomiting    Sumatriptan Nausea And Vomiting     No current facility-administered medications for this visit. No current outpatient medications on file.      Facility-Administered Medications Ordered in Other Visits   Medication Dose Route Frequency Provider Last Rate Last Admin    potassium chloride (KLOR-CON M) extended release tablet 40 mEq  40 mEq Oral PRN Michael Ramirez MD        Or    potassium bicarb-citric acid (EFFER-K) effervescent tablet 40 mEq  40 mEq Oral PRN

## 2023-10-09 NOTE — PROGRESS NOTES
Follow all instructions given by your physician  NPO after midnight  Sips of water am of surgery with allowed medications  Bring insurance info and 's license  Wear clean comfortable , loose-fitting clothing  No jewelry or contact lenses to be worn day of surgery  No glue on dentures morning of surgery; you will be asked to remove them for surgery. Case for glasses. Shower the night before and the morning of surgery with a liquid antibacterial soap, dry with new fresh clean towel after each shower, no lotions, creams or powder. Clean sheets and pillowcase on bed night before surgery  Bring medications in original bottles  Bring CPAP/BIPAP machine if you have one ( you may be charged if one is needed in recovery room )    Our pharmacy has a Meds to [facility] program where they will deliver any new prescriptions you may have to your room before you leave. Our Pharmacy will clear it through your insurance; for example (same co pay). This enables you to take your new RX as soon as you need when you get home and avoids stop/wait delays on the way home. Please have a form of payment with you and have someone designated as your Pharmacy contact with their phone # as you may not feel well or still be under the influence of anesthesia. Please refer to the SSI-Surgical Site Infection Flyer you hopefully received in the mail-together we can prevent infections; signs and symptoms reviewed. When discharged be sure you understand how to care for your wound and that you have the Dr./office phone # to call if you have any concerns or questions about your wound.      needed at discharge and someone over 18 to stay with you for 24 hours overnight (surgery may be cancelled if you don't have this)  Report to [facility] on 2nd floor  If you would become ill prior to surgery, please call the surgeon  May have a visitor with you, we request that you limit to 2 visitors in pre-op area  Masks are recommended but not required, new

## 2023-10-09 NOTE — PROGRESS NOTES
PAT Call Date: 10/9   Surgery Date: 10/12    Surgeon: Tj [facility]   Surgery: crocotracheal resection,vocal fold +    Is patient from a nursing home? No   Any Isolation Precautions? No   Any Pacemaker or ICD? No If YES, has it been checked recently and where? Has the rep been notified? No     On Snapboard? No  5/11/23 sx w/ Kayla   Hard Copy on Chart  In EPIC Pending/Notes   Consent -   Within 30 days; signed, dated & timed by patient and physician     [x] On Arrival     [] Blood    Additional Consent Needs:     H&P - Within 30 days    [x] Physician To Do     [] H&P Update - If H&P is older then 24 hours    Clearance -  Medical, Cardiac, Pulmonary, etc.   8/16 Nallu-mod   Orders - Signed and Dated    Copy Sent to Pharm []    [] Physician To Do    Labs - Within 3 months   ORDERED will do today in [facility]  [x] CBC    [x] CMP   [] GFR   [] INR    [] PTT    [] Urine    [] Liver Enzymes    [] Kidney Function    [] MRSA Nasal   [] MSSA      Others:    Radiology Studies-   Within 1 year  2/1  [x]P. Chest X-Ray-ok   [] MRI    [] CT    EKG -   Within 1 year, unless hx of HTN  2/2 cleared   Cardiac Workup -   Stress Test, Echo, Cath within 18 months    6/6/22 1/23  [] Cath                                [x] Stress Test                      [x] Echo 63%   [] Holter Monitor    [] JOE    PT STATES SHE IS DIFFICULT STICK

## 2023-10-10 LAB
ABSOLUTE BASO #: 0.04 K/UL (ref 0–0.2)
ABSOLUTE EOS #: 0.17 K/UL (ref 0–0.5)
ABSOLUTE LYMPH #: 1.99 K/UL (ref 1–4)
ABSOLUTE MONO #: 0.56 K/UL (ref 0.2–1)
ABSOLUTE NEUT #: 5.99 K/UL (ref 1.5–7.5)
ALBUMIN SERPL-MCNC: 4.2 G/DL (ref 3.5–5.2)
ALK PHOSPHATASE: 120 U/L (ref 40–142)
ALT SERPL-CCNC: 17 U/L (ref 5–40)
ANION GAP SERPL CALCULATED.3IONS-SCNC: 11 MEQ/L (ref 7–16)
AST SERPL-CCNC: 25 U/L (ref 9–40)
BASOPHILS RELATIVE PERCENT: 0.5 %
BILIRUB SERPL-MCNC: 0.4 MG/DL
BUN BLDV-MCNC: 18 MG/DL (ref 8–23)
CALCIUM SERPL-MCNC: 9.4 MG/DL (ref 8.5–10.5)
CHLORIDE BLD-SCNC: 106 MEQ/L (ref 95–107)
CO2: 25 MEQ/L (ref 19–31)
CREAT SERPL-MCNC: 0.79 MG/DL (ref 0.6–1.3)
EGFR IF NONAFRICAN AMERICAN: 80 ML/MIN/1.73
EOSINOPHILS RELATIVE PERCENT: 1.9 %
GLUCOSE: 171 MG/DL (ref 70–99)
HCT VFR BLD CALC: 36 % (ref 34–45)
HEMOGLOBIN: 11.9 G/DL (ref 11.5–15.5)
LYMPHOCYTE %: 22.6 %
MCH RBC QN AUTO: 28.5 PG (ref 25–33)
MCHC RBC AUTO-ENTMCNC: 33.1 G/DL (ref 31–36)
MCV RBC AUTO: 86.3 FL (ref 80–99)
MONOCYTES # BLD: 6.4 %
NEUTROPHILS RELATIVE PERCENT: 68.1 %
PDW BLD-RTO: 13.1 % (ref 11.5–15)
PLATELETS: 256 K/UL (ref 130–400)
PMV BLD AUTO: 10.2 FL (ref 9.3–13)
POTASSIUM SERPL-SCNC: 4.4 MEQ/L (ref 3.5–5.4)
RBC: 4.17 M/UL (ref 3.8–5.4)
SODIUM BLD-SCNC: 142 MEQ/L (ref 133–146)
TOTAL PROTEIN: 6.4 G/DL (ref 6.1–8.3)
WBC: 8.8 K/UL (ref 3.5–11)

## 2023-10-10 PROCEDURE — APPNB45 APP NON BILLABLE 31-45 MINUTES: Performed by: NURSE PRACTITIONER

## 2023-10-10 RX ORDER — SODIUM CHLORIDE 9 MG/ML
INJECTION, SOLUTION INTRAVENOUS PRN
Status: CANCELLED | OUTPATIENT
Start: 2023-10-10

## 2023-10-10 RX ORDER — SODIUM CHLORIDE 0.9 % (FLUSH) 0.9 %
5-40 SYRINGE (ML) INJECTION EVERY 12 HOURS SCHEDULED
Status: CANCELLED | OUTPATIENT
Start: 2023-10-10

## 2023-10-10 RX ORDER — DEXAMETHASONE SODIUM PHOSPHATE 10 MG/ML
10 INJECTION INTRAMUSCULAR; INTRAVENOUS ONCE
Status: CANCELLED | OUTPATIENT
Start: 2023-10-10 | End: 2023-10-10

## 2023-10-10 RX ORDER — SODIUM CHLORIDE 0.9 % (FLUSH) 0.9 %
5-40 SYRINGE (ML) INJECTION PRN
Status: CANCELLED | OUTPATIENT
Start: 2023-10-10

## 2023-10-10 NOTE — PROGRESS NOTES
Asked John with Dr Freida Ge office for recent labs. Pt told john will be getting [facility] today [facility].   John will fax when she receives them

## 2023-10-11 ENCOUNTER — ANESTHESIA EVENT (OUTPATIENT)
Dept: OPERATING ROOM | Age: 73
End: 2023-10-11
Payer: MEDICARE

## 2023-10-11 RX ORDER — ENOXAPARIN SODIUM 100 MG/ML
40 INJECTION SUBCUTANEOUS DAILY
Status: CANCELLED | OUTPATIENT
Start: 2023-10-11

## 2023-10-12 ENCOUNTER — HOSPITAL ENCOUNTER (INPATIENT)
Age: 73
DRG: 163 | End: 2023-10-12
Attending: OTOLARYNGOLOGY | Admitting: INTERNAL MEDICINE
Payer: MEDICARE

## 2023-10-12 ENCOUNTER — APPOINTMENT (OUTPATIENT)
Dept: GENERAL RADIOLOGY | Age: 73
DRG: 163 | End: 2023-10-12
Attending: OTOLARYNGOLOGY
Payer: MEDICARE

## 2023-10-12 ENCOUNTER — ANESTHESIA (OUTPATIENT)
Dept: OPERATING ROOM | Age: 73
End: 2023-10-12
Payer: MEDICARE

## 2023-10-12 ENCOUNTER — APPOINTMENT (OUTPATIENT)
Dept: INTERVENTIONAL RADIOLOGY/VASCULAR | Age: 73
DRG: 163 | End: 2023-10-12
Attending: OTOLARYNGOLOGY
Payer: MEDICARE

## 2023-10-12 DIAGNOSIS — J95.03 TRACHEAL STENOSIS DUE TO TRACHEOSTOMY (HCC): ICD-10-CM

## 2023-10-12 DIAGNOSIS — J38.6 POSTERIOR GLOTTIC STENOSIS: ICD-10-CM

## 2023-10-12 DIAGNOSIS — Z93.0 TRACHEOSTOMY DEPENDENCE (HCC): ICD-10-CM

## 2023-10-12 PROBLEM — Z90.09: Status: ACTIVE | Noted: 2023-10-12

## 2023-10-12 LAB
ANION GAP SERPL CALC-SCNC: 13 MEQ/L (ref 8–16)
APTT PPP: 21.9 SECONDS (ref 22–38)
BACTERIA: ABNORMAL
BASOPHILS ABSOLUTE: 0 THOU/MM3 (ref 0–0.1)
BASOPHILS NFR BLD AUTO: 0.2 %
BILIRUB UR QL STRIP: NEGATIVE
BUN SERPL-MCNC: 15 MG/DL (ref 7–22)
CA-I BLD ISE-SCNC: 1.15 MMOL/L (ref 1.12–1.32)
CALCIUM SERPL-MCNC: 8.3 MG/DL (ref 8.5–10.5)
CASTS #/AREA URNS LPF: ABNORMAL /LPF
CHARACTER UR: CLEAR
CHLORIDE SERPL-SCNC: 107 MEQ/L (ref 98–111)
CO2 SERPL-SCNC: 20 MEQ/L (ref 23–33)
COLOR UR: YELLOW
CREAT SERPL-MCNC: 0.8 MG/DL (ref 0.4–1.2)
CRYSTALS URNS QL MICRO: ABNORMAL
DEPRECATED RDW RBC AUTO: 44.5 FL (ref 35–45)
EOSINOPHIL NFR BLD AUTO: 0 %
EOSINOPHILS ABSOLUTE: 0 THOU/MM3 (ref 0–0.4)
EPITHELIAL CELLS, UA: ABNORMAL /HPF
ERYTHROCYTE [DISTWIDTH] IN BLOOD BY AUTOMATED COUNT: 13.5 % (ref 11.5–14.5)
GFR SERPL CREATININE-BSD FRML MDRD: > 60 ML/MIN/1.73M2
GLUCOSE BLD STRIP.AUTO-MCNC: 245 MG/DL (ref 70–108)
GLUCOSE SERPL-MCNC: 299 MG/DL (ref 70–108)
GLUCOSE UR QL STRIP.AUTO: 500 MG/DL
HCT VFR BLD AUTO: 35.8 % (ref 37–47)
HGB BLD-MCNC: 11.5 GM/DL (ref 12–16)
HGB UR QL STRIP.AUTO: NEGATIVE
IMM GRANULOCYTES # BLD AUTO: 0.15 THOU/MM3 (ref 0–0.07)
IMM GRANULOCYTES NFR BLD AUTO: 0.9 %
INR PPP: 1.02 (ref 0.85–1.13)
KETONES UR QL STRIP.AUTO: ABNORMAL
LEUKOCYTE ESTERASE UR QL STRIP.AUTO: NEGATIVE
LYMPHOCYTES ABSOLUTE: 0.5 THOU/MM3 (ref 1–4.8)
LYMPHOCYTES NFR BLD AUTO: 3.2 %
MAGNESIUM SERPL-MCNC: 1.7 MG/DL (ref 1.6–2.4)
MCH RBC QN AUTO: 29.1 PG (ref 26–33)
MCHC RBC AUTO-ENTMCNC: 32.1 GM/DL (ref 32.2–35.5)
MCV RBC AUTO: 90.6 FL (ref 81–99)
MONOCYTES ABSOLUTE: 0.4 THOU/MM3 (ref 0.4–1.3)
MONOCYTES NFR BLD AUTO: 2.3 %
MRSA DNA SPEC QL NAA+PROBE: NEGATIVE
NEUTROPHILS NFR BLD AUTO: 93.4 %
NITRITE UR QL STRIP.AUTO: NEGATIVE
NRBC BLD AUTO-RTO: 0 /100 WBC
PH UR STRIP.AUTO: 6 [PH] (ref 5–9)
PLATELET # BLD AUTO: 241 THOU/MM3 (ref 130–400)
PMV BLD AUTO: 10.2 FL (ref 9.4–12.4)
POTASSIUM SERPL-SCNC: 4.6 MEQ/L (ref 3.5–5.2)
PROT UR STRIP.AUTO-MCNC: NEGATIVE MG/DL
RBC # BLD AUTO: 3.95 MILL/MM3 (ref 4.2–5.4)
RBC #/AREA URNS HPF: ABNORMAL /HPF
RENAL EPI CELLS #/AREA URNS HPF: ABNORMAL /[HPF]
SEGMENTED NEUTROPHILS ABSOLUTE COUNT: 15.8 THOU/MM3 (ref 1.8–7.7)
SODIUM SERPL-SCNC: 140 MEQ/L (ref 135–145)
SPECIFIC GRAVITY UA: >= 1.03 (ref 1–1.03)
UROBILINOGEN, URINE: 0.2 EU/DL (ref 0–1)
WBC # BLD AUTO: 16.9 THOU/MM3 (ref 4.8–10.8)
WBC #/AREA URNS HPF: ABNORMAL /HPF
YEAST LIKE FUNGI URNS QL MICRO: ABNORMAL

## 2023-10-12 PROCEDURE — 0BU107Z SUPPLEMENT TRACHEA WITH AUTOLOGOUS TISSUE SUBSTITUTE, OPEN APPROACH: ICD-10-PCS | Performed by: OTOLARYNGOLOGY

## 2023-10-12 PROCEDURE — 94761 N-INVAS EAR/PLS OXIMETRY MLT: CPT

## 2023-10-12 PROCEDURE — 2500000003 HC RX 250 WO HCPCS: Performed by: REGISTERED NURSE

## 2023-10-12 PROCEDURE — 2720000010 HC SURG SUPPLY STERILE: Performed by: OTOLARYNGOLOGY

## 2023-10-12 PROCEDURE — 2580000003 HC RX 258: Performed by: NURSE PRACTITIONER

## 2023-10-12 PROCEDURE — 87205 SMEAR GRAM STAIN: CPT

## 2023-10-12 PROCEDURE — 87581 M.PNEUMON DNA AMP PROBE: CPT

## 2023-10-12 PROCEDURE — 3700000001 HC ADD 15 MINUTES (ANESTHESIA): Performed by: OTOLARYNGOLOGY

## 2023-10-12 PROCEDURE — 3600000013 HC SURGERY LEVEL 3 ADDTL 15MIN: Performed by: OTOLARYNGOLOGY

## 2023-10-12 PROCEDURE — 6370000000 HC RX 637 (ALT 250 FOR IP): Performed by: NURSE PRACTITIONER

## 2023-10-12 PROCEDURE — 85610 PROTHROMBIN TIME: CPT

## 2023-10-12 PROCEDURE — 87641 MR-STAPH DNA AMP PROBE: CPT

## 2023-10-12 PROCEDURE — 0BH17EZ INSERTION OF ENDOTRACHEAL AIRWAY INTO TRACHEA, VIA NATURAL OR ARTIFICIAL OPENING: ICD-10-PCS | Performed by: OTOLARYNGOLOGY

## 2023-10-12 PROCEDURE — 2580000003 HC RX 258: Performed by: OTOLARYNGOLOGY

## 2023-10-12 PROCEDURE — 82330 ASSAY OF CALCIUM: CPT

## 2023-10-12 PROCEDURE — 6360000002 HC RX W HCPCS: Performed by: NURSE PRACTITIONER

## 2023-10-12 PROCEDURE — 87086 URINE CULTURE/COLONY COUNT: CPT

## 2023-10-12 PROCEDURE — 87486 CHLMYD PNEUM DNA AMP PROBE: CPT

## 2023-10-12 PROCEDURE — 87070 CULTURE OTHR SPECIMN AEROBIC: CPT

## 2023-10-12 PROCEDURE — 85730 THROMBOPLASTIN TIME PARTIAL: CPT

## 2023-10-12 PROCEDURE — A4216 STERILE WATER/SALINE, 10 ML: HCPCS | Performed by: OTOLARYNGOLOGY

## 2023-10-12 PROCEDURE — 0BB10ZZ EXCISION OF TRACHEA, OPEN APPROACH: ICD-10-PCS | Performed by: OTOLARYNGOLOGY

## 2023-10-12 PROCEDURE — A4216 STERILE WATER/SALINE, 10 ML: HCPCS | Performed by: NURSE PRACTITIONER

## 2023-10-12 PROCEDURE — 0CQS0ZZ REPAIR LARYNX, OPEN APPROACH: ICD-10-PCS | Performed by: OTOLARYNGOLOGY

## 2023-10-12 PROCEDURE — 94002 VENT MGMT INPAT INIT DAY: CPT

## 2023-10-12 PROCEDURE — 88304 TISSUE EXAM BY PATHOLOGIST: CPT

## 2023-10-12 PROCEDURE — 6360000002 HC RX W HCPCS: Performed by: REGISTERED NURSE

## 2023-10-12 PROCEDURE — 36415 COLL VENOUS BLD VENIPUNCTURE: CPT

## 2023-10-12 PROCEDURE — 6360000002 HC RX W HCPCS: Performed by: OTOLARYNGOLOGY

## 2023-10-12 PROCEDURE — 31551 LARYNGOPLASTY LARYNGEAL STEN: CPT | Performed by: OTOLARYNGOLOGY

## 2023-10-12 PROCEDURE — 3600000003 HC SURGERY LEVEL 3 BASE: Performed by: OTOLARYNGOLOGY

## 2023-10-12 PROCEDURE — 88305 TISSUE EXAM BY PATHOLOGIST: CPT

## 2023-10-12 PROCEDURE — 2700000000 HC OXYGEN THERAPY PER DAY

## 2023-10-12 PROCEDURE — 87541 LEGION PNEUMO DNA AMP PROB: CPT

## 2023-10-12 PROCEDURE — 2500000003 HC RX 250 WO HCPCS: Performed by: NURSE PRACTITIONER

## 2023-10-12 PROCEDURE — 99291 CRITICAL CARE FIRST HOUR: CPT | Performed by: INTERNAL MEDICINE

## 2023-10-12 PROCEDURE — C1894 INTRO/SHEATH, NON-LASER: HCPCS | Performed by: OTOLARYNGOLOGY

## 2023-10-12 PROCEDURE — 3700000000 HC ANESTHESIA ATTENDED CARE: Performed by: OTOLARYNGOLOGY

## 2023-10-12 PROCEDURE — 71045 X-RAY EXAM CHEST 1 VIEW: CPT

## 2023-10-12 PROCEDURE — 82948 REAGENT STRIP/BLOOD GLUCOSE: CPT

## 2023-10-12 PROCEDURE — 93970 EXTREMITY STUDY: CPT

## 2023-10-12 PROCEDURE — 2709999900 HC NON-CHARGEABLE SUPPLY: Performed by: OTOLARYNGOLOGY

## 2023-10-12 PROCEDURE — 81001 URINALYSIS AUTO W/SCOPE: CPT

## 2023-10-12 PROCEDURE — 0CBS0ZZ EXCISION OF LARYNX, OPEN APPROACH: ICD-10-PCS | Performed by: OTOLARYNGOLOGY

## 2023-10-12 PROCEDURE — 80048 BASIC METABOLIC PNL TOTAL CA: CPT

## 2023-10-12 PROCEDURE — 31592 CRICOTRACHEAL RESECTION: CPT | Performed by: OTOLARYNGOLOGY

## 2023-10-12 PROCEDURE — 87798 DETECT AGENT NOS DNA AMP: CPT

## 2023-10-12 PROCEDURE — 2000000000 HC ICU R&B

## 2023-10-12 PROCEDURE — 85025 COMPLETE CBC W/AUTO DIFF WBC: CPT

## 2023-10-12 PROCEDURE — 87631 RESP VIRUS 3-5 TARGETS: CPT

## 2023-10-12 PROCEDURE — 83735 ASSAY OF MAGNESIUM: CPT

## 2023-10-12 DEVICE — SEALANT TISS 10 ML FIBRIN VISTASEAL: Type: IMPLANTABLE DEVICE | Site: NECK | Status: FUNCTIONAL

## 2023-10-12 RX ORDER — ONDANSETRON 2 MG/ML
4 INJECTION INTRAMUSCULAR; INTRAVENOUS EVERY 6 HOURS PRN
Status: DISCONTINUED | OUTPATIENT
Start: 2023-10-12 | End: 2023-11-20

## 2023-10-12 RX ORDER — OXYCODONE HYDROCHLORIDE 5 MG/1
5 TABLET ORAL EVERY 4 HOURS PRN
Status: DISCONTINUED | OUTPATIENT
Start: 2023-10-12 | End: 2023-11-20

## 2023-10-12 RX ORDER — CHLORHEXIDINE GLUCONATE ORAL RINSE 1.2 MG/ML
15 SOLUTION DENTAL 2 TIMES DAILY
Status: DISPENSED | OUTPATIENT
Start: 2023-10-12

## 2023-10-12 RX ORDER — HYDROMORPHONE HYDROCHLORIDE 2 MG/ML
INJECTION, SOLUTION INTRAMUSCULAR; INTRAVENOUS; SUBCUTANEOUS PRN
Status: DISCONTINUED | OUTPATIENT
Start: 2023-10-12 | End: 2023-10-12 | Stop reason: SDUPTHER

## 2023-10-12 RX ORDER — ROCURONIUM BROMIDE 10 MG/ML
INJECTION, SOLUTION INTRAVENOUS PRN
Status: DISCONTINUED | OUTPATIENT
Start: 2023-10-12 | End: 2023-10-12 | Stop reason: SDUPTHER

## 2023-10-12 RX ORDER — OXYCODONE HYDROCHLORIDE 5 MG/1
10 TABLET ORAL EVERY 4 HOURS PRN
Status: DISCONTINUED | OUTPATIENT
Start: 2023-10-12 | End: 2023-11-20

## 2023-10-12 RX ORDER — DEXAMETHASONE SODIUM PHOSPHATE 4 MG/ML
10 INJECTION, SOLUTION INTRA-ARTICULAR; INTRALESIONAL; INTRAMUSCULAR; INTRAVENOUS; SOFT TISSUE ONCE
Status: COMPLETED | OUTPATIENT
Start: 2023-10-12 | End: 2023-10-12

## 2023-10-12 RX ORDER — ACETAMINOPHEN 160 MG/5ML
650 LIQUID ORAL EVERY 4 HOURS PRN
Status: DISPENSED | OUTPATIENT
Start: 2023-10-12

## 2023-10-12 RX ORDER — PROPOFOL 10 MG/ML
INJECTION, EMULSION INTRAVENOUS CONTINUOUS PRN
Status: DISCONTINUED | OUTPATIENT
Start: 2023-10-12 | End: 2023-10-12 | Stop reason: SDUPTHER

## 2023-10-12 RX ORDER — ONDANSETRON 4 MG/1
4 TABLET, ORALLY DISINTEGRATING ORAL EVERY 8 HOURS PRN
Status: DISCONTINUED | OUTPATIENT
Start: 2023-10-12 | End: 2023-11-20

## 2023-10-12 RX ORDER — INSULIN LISPRO 100 [IU]/ML
0-8 INJECTION, SOLUTION INTRAVENOUS; SUBCUTANEOUS EVERY 4 HOURS
Status: DISCONTINUED | OUTPATIENT
Start: 2023-10-12 | End: 2023-10-19

## 2023-10-12 RX ORDER — MIDAZOLAM HYDROCHLORIDE 1 MG/ML
INJECTION INTRAMUSCULAR; INTRAVENOUS PRN
Status: DISCONTINUED | OUTPATIENT
Start: 2023-10-12 | End: 2023-10-12 | Stop reason: SDUPTHER

## 2023-10-12 RX ORDER — SODIUM CHLORIDE 0.9 % (FLUSH) 0.9 %
5-40 SYRINGE (ML) INJECTION PRN
Status: DISCONTINUED | OUTPATIENT
Start: 2023-10-12 | End: 2023-10-12 | Stop reason: HOSPADM

## 2023-10-12 RX ORDER — METRONIDAZOLE 500 MG/100ML
500 INJECTION, SOLUTION INTRAVENOUS
Status: COMPLETED | OUTPATIENT
Start: 2023-10-12 | End: 2023-10-12

## 2023-10-12 RX ORDER — SODIUM CHLORIDE 0.9 % (FLUSH) 0.9 %
5-40 SYRINGE (ML) INJECTION EVERY 12 HOURS SCHEDULED
Status: ACTIVE | OUTPATIENT
Start: 2023-10-12

## 2023-10-12 RX ORDER — DEXTROSE MONOHYDRATE 100 MG/ML
INJECTION, SOLUTION INTRAVENOUS CONTINUOUS PRN
Status: ACTIVE | OUTPATIENT
Start: 2023-10-12

## 2023-10-12 RX ORDER — SODIUM CHLORIDE 9 MG/ML
INJECTION, SOLUTION INTRAVENOUS PRN
Status: DISCONTINUED | OUTPATIENT
Start: 2023-10-12 | End: 2023-10-12 | Stop reason: HOSPADM

## 2023-10-12 RX ORDER — PROPOFOL 10 MG/ML
INJECTION, EMULSION INTRAVENOUS PRN
Status: DISCONTINUED | OUTPATIENT
Start: 2023-10-12 | End: 2023-10-12 | Stop reason: SDUPTHER

## 2023-10-12 RX ORDER — PROPOFOL 10 MG/ML
5-50 INJECTION, EMULSION INTRAVENOUS CONTINUOUS
Status: DISCONTINUED | OUTPATIENT
Start: 2023-10-12 | End: 2023-10-19

## 2023-10-12 RX ORDER — METRONIDAZOLE 500 MG/100ML
500 INJECTION, SOLUTION INTRAVENOUS EVERY 8 HOURS
Status: DISCONTINUED | OUTPATIENT
Start: 2023-10-12 | End: 2023-10-16

## 2023-10-12 RX ORDER — IBUPROFEN 600 MG/1
1 TABLET ORAL PRN
Status: ACTIVE | OUTPATIENT
Start: 2023-10-12

## 2023-10-12 RX ORDER — INSULIN LISPRO 100 [IU]/ML
0-4 INJECTION, SOLUTION INTRAVENOUS; SUBCUTANEOUS NIGHTLY
Status: DISCONTINUED | OUTPATIENT
Start: 2023-10-12 | End: 2023-10-19

## 2023-10-12 RX ORDER — SODIUM CHLORIDE 0.9 % (FLUSH) 0.9 %
5-40 SYRINGE (ML) INJECTION PRN
Status: ACTIVE | OUTPATIENT
Start: 2023-10-12

## 2023-10-12 RX ORDER — FENTANYL CITRATE 50 UG/ML
INJECTION, SOLUTION INTRAMUSCULAR; INTRAVENOUS PRN
Status: DISCONTINUED | OUTPATIENT
Start: 2023-10-12 | End: 2023-10-12 | Stop reason: SDUPTHER

## 2023-10-12 RX ORDER — NALOXONE HYDROCHLORIDE 0.4 MG/ML
INJECTION, SOLUTION INTRAMUSCULAR; INTRAVENOUS; SUBCUTANEOUS PRN
Status: DISCONTINUED | OUTPATIENT
Start: 2023-10-12 | End: 2023-10-12 | Stop reason: SDUPTHER

## 2023-10-12 RX ORDER — EPINEPHRINE 1 MG/ML
INJECTION, SOLUTION, CONCENTRATE INTRAVENOUS PRN
Status: DISCONTINUED | OUTPATIENT
Start: 2023-10-12 | End: 2023-10-12 | Stop reason: ALTCHOICE

## 2023-10-12 RX ORDER — SODIUM CHLORIDE 9 MG/ML
INJECTION, SOLUTION INTRAVENOUS CONTINUOUS
Status: DISCONTINUED | OUTPATIENT
Start: 2023-10-12 | End: 2023-10-14

## 2023-10-12 RX ORDER — SODIUM CHLORIDE 9 MG/ML
INJECTION, SOLUTION INTRAVENOUS PRN
Status: DISCONTINUED | OUTPATIENT
Start: 2023-10-12 | End: 2023-11-03

## 2023-10-12 RX ORDER — SODIUM CHLORIDE 9 MG/ML
INJECTION INTRAVENOUS PRN
Status: DISCONTINUED | OUTPATIENT
Start: 2023-10-12 | End: 2023-10-12 | Stop reason: ALTCHOICE

## 2023-10-12 RX ORDER — SODIUM CHLORIDE 0.9 % (FLUSH) 0.9 %
5-40 SYRINGE (ML) INJECTION EVERY 12 HOURS SCHEDULED
Status: DISCONTINUED | OUTPATIENT
Start: 2023-10-12 | End: 2023-10-12 | Stop reason: HOSPADM

## 2023-10-12 RX ORDER — POLYETHYLENE GLYCOL 3350 17 G/17G
17 POWDER, FOR SOLUTION ORAL DAILY PRN
Status: DISPENSED | OUTPATIENT
Start: 2023-10-12

## 2023-10-12 RX ORDER — ONDANSETRON 2 MG/ML
INJECTION INTRAMUSCULAR; INTRAVENOUS PRN
Status: DISCONTINUED | OUTPATIENT
Start: 2023-10-12 | End: 2023-10-12 | Stop reason: SDUPTHER

## 2023-10-12 RX ADMIN — SODIUM CHLORIDE, PRESERVATIVE FREE 20 MG: 5 INJECTION INTRAVENOUS at 20:05

## 2023-10-12 RX ADMIN — PROPOFOL 100 MG: 10 INJECTION, EMULSION INTRAVENOUS at 15:51

## 2023-10-12 RX ADMIN — HYDROMORPHONE HYDROCHLORIDE 0.5 MG: 2 INJECTION INTRAMUSCULAR; INTRAVENOUS; SUBCUTANEOUS at 14:31

## 2023-10-12 RX ADMIN — METRONIDAZOLE 500 MG: 500 INJECTION, SOLUTION INTRAVENOUS at 20:16

## 2023-10-12 RX ADMIN — ROCURONIUM BROMIDE 50 MG: 10 INJECTION INTRAVENOUS at 07:59

## 2023-10-12 RX ADMIN — PROPOFOL 50 MG: 10 INJECTION, EMULSION INTRAVENOUS at 14:58

## 2023-10-12 RX ADMIN — MIDAZOLAM 3 MG: 1 INJECTION INTRAMUSCULAR; INTRAVENOUS at 07:53

## 2023-10-12 RX ADMIN — MIDAZOLAM 2 MG: 1 INJECTION INTRAMUSCULAR; INTRAVENOUS at 13:00

## 2023-10-12 RX ADMIN — CHLORHEXIDINE GLUCONATE 0.12% ORAL RINSE 15 ML: 1.2 LIQUID ORAL at 20:05

## 2023-10-12 RX ADMIN — ROCURONIUM BROMIDE 50 MG: 10 INJECTION INTRAVENOUS at 09:10

## 2023-10-12 RX ADMIN — SUGAMMADEX 200 MG: 100 INJECTION, SOLUTION INTRAVENOUS at 15:13

## 2023-10-12 RX ADMIN — PIPERACILLIN AND TAZOBACTAM 3375 MG: 3; .375 INJECTION, POWDER, LYOPHILIZED, FOR SOLUTION INTRAVENOUS at 07:39

## 2023-10-12 RX ADMIN — ROCURONIUM BROMIDE 25 MG: 10 INJECTION INTRAVENOUS at 11:00

## 2023-10-12 RX ADMIN — PROPOFOL 150 MG: 10 INJECTION, EMULSION INTRAVENOUS at 07:59

## 2023-10-12 RX ADMIN — VANCOMYCIN HYDROCHLORIDE 1000 MG: 1 INJECTION, POWDER, LYOPHILIZED, FOR SOLUTION INTRAVENOUS at 09:12

## 2023-10-12 RX ADMIN — OXYCODONE HYDROCHLORIDE 5 MG: 5 TABLET ORAL at 20:17

## 2023-10-12 RX ADMIN — SODIUM CHLORIDE: 9 INJECTION, SOLUTION INTRAVENOUS at 15:11

## 2023-10-12 RX ADMIN — PIPERACILLIN AND TAZOBACTAM 3375 MG: 3; .375 INJECTION, POWDER, LYOPHILIZED, FOR SOLUTION INTRAVENOUS at 22:54

## 2023-10-12 RX ADMIN — VANCOMYCIN HYDROCHLORIDE 750 MG: 1 INJECTION, POWDER, LYOPHILIZED, FOR SOLUTION INTRAVENOUS at 20:04

## 2023-10-12 RX ADMIN — DEXAMETHASONE SODIUM PHOSPHATE 10 MG: 4 INJECTION, SOLUTION INTRAMUSCULAR; INTRAVENOUS at 07:16

## 2023-10-12 RX ADMIN — PROPOFOL 45 MCG/KG/MIN: 10 INJECTION, EMULSION INTRAVENOUS at 20:13

## 2023-10-12 RX ADMIN — SODIUM CHLORIDE: 9 INJECTION, SOLUTION INTRAVENOUS at 07:16

## 2023-10-12 RX ADMIN — ONDANSETRON 4 MG: 2 INJECTION INTRAMUSCULAR; INTRAVENOUS at 14:36

## 2023-10-12 RX ADMIN — METRONIDAZOLE 500 MG: 500 SOLUTION INTRAVENOUS at 08:12

## 2023-10-12 RX ADMIN — NALOXONE HYDROCHLORIDE 0.04 MG: 0.4 INJECTION, SOLUTION INTRAMUSCULAR; INTRAVENOUS; SUBCUTANEOUS at 15:42

## 2023-10-12 RX ADMIN — ONDANSETRON 4 MG: 2 INJECTION INTRAMUSCULAR; INTRAVENOUS at 08:06

## 2023-10-12 RX ADMIN — NALOXONE HYDROCHLORIDE 0.04 MG: 0.4 INJECTION, SOLUTION INTRAMUSCULAR; INTRAVENOUS; SUBCUTANEOUS at 15:44

## 2023-10-12 RX ADMIN — SODIUM CHLORIDE: 9 INJECTION, SOLUTION INTRAVENOUS at 10:03

## 2023-10-12 RX ADMIN — FENTANYL CITRATE 50 MCG: 50 INJECTION, SOLUTION INTRAMUSCULAR; INTRAVENOUS at 09:10

## 2023-10-12 RX ADMIN — NALOXONE HYDROCHLORIDE 0.04 MG: 0.4 INJECTION, SOLUTION INTRAMUSCULAR; INTRAVENOUS; SUBCUTANEOUS at 15:48

## 2023-10-12 RX ADMIN — HYDROMORPHONE HYDROCHLORIDE 0.5 MG: 1 INJECTION, SOLUTION INTRAMUSCULAR; INTRAVENOUS; SUBCUTANEOUS at 22:51

## 2023-10-12 RX ADMIN — FENTANYL CITRATE 50 MCG: 50 INJECTION, SOLUTION INTRAMUSCULAR; INTRAVENOUS at 08:12

## 2023-10-12 RX ADMIN — INSULIN LISPRO 2 UNITS: 100 INJECTION, SOLUTION INTRAVENOUS; SUBCUTANEOUS at 23:30

## 2023-10-12 RX ADMIN — PROPOFOL 150 MCG/KG/MIN: 10 INJECTION, EMULSION INTRAVENOUS at 12:47

## 2023-10-12 RX ADMIN — HYDROMORPHONE HYDROCHLORIDE 1 MG: 2 INJECTION INTRAMUSCULAR; INTRAVENOUS; SUBCUTANEOUS at 09:17

## 2023-10-12 RX ADMIN — PROPOFOL 30 MG: 10 INJECTION, EMULSION INTRAVENOUS at 15:11

## 2023-10-12 ASSESSMENT — PAIN - FUNCTIONAL ASSESSMENT: PAIN_FUNCTIONAL_ASSESSMENT: 0-10

## 2023-10-12 ASSESSMENT — ENCOUNTER SYMPTOMS: SHORTNESS OF BREATH: 1

## 2023-10-12 ASSESSMENT — PAIN SCALES - GENERAL
PAINLEVEL_OUTOF10: 3
PAINLEVEL_OUTOF10: 0

## 2023-10-12 ASSESSMENT — PULMONARY FUNCTION TESTS
PIF_VALUE: 27
PIF_VALUE: 27

## 2023-10-12 NOTE — H&P
CRITICAL CARE- History & Physical      Patient: Edmund Cottrell    Unit/Bed:4D-08/008-A  YOB: 1950  MRN: [id number]   Acct: [de-identified]   PCP: Luciana Romero MD    Date of Service: Pt seen/examined on 10/12/23  and Admitted to Inpatient with expected LOS greater than two midnights due to medical therapy. Chief Complaint:  Tracheal Stenosis s/p resection     Assessment and Plan:-  S/p segmental cricotracheal resection, laryngoplasty w/VF lateralization: POD#0 - OR w/Dr. 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 5.0 ETT placed w/b/l bulb-suction drains placed. Complex airway mgmt requiring ICU monitoring + constant sedation  Propofol gtt w/PRN pain medications - RASS -2 to -3 in order to minimize movement/aggravation and involuntary coughs  Maintain forward flexion of neck w/2-pillows  Drain mgmt -> document OP per nursing protocol -> if bulb loses suction - immediately notify provider   NO steroids per ENT   CAD s/p PCI, HLD, and Chronic Diastolic CHF w/recovered EF: Followed [facility] Cardiologist. Pre-op clearance from Cardiology. Echo 1/23 shows EF 60-65% w/G1DD and mild LA dilation (previous Echo 2021 EF 30-35%). Stress test 6/22 (-) for ischemia. Was previously on ASA 81mg - per report not taking  Crestor 10mg nightly -> held currently due to procedure  NIDDM2: HbA1c 1/2023 at 5.5. Insulin regimen previously, but no longer taking per medication review. BG elevated likely 2/2 surgical stress and steroids given. MD-SSI q4hrs due to no PO intake + Hypoglycemia protocol  Leukocytosis suspect 2/2 steroid: WBC elevated post surgical procedure. NO systemic indication for infections at this time.    On Vanc, Zosyn, and Flagyl per ENT    History Of Present Illness:    80 yo F w/significant PMHx of  CAD, HLD, NIDDM2, H/o provoked DVT/PE after hip surgery (not 8. 8   HGB 11.9   HCT 36.0        Recent Labs     10/10/23  0845      K 4.4      CO2 25   BUN 18   CREATININE 0.79   CALCIUM 9.4     Recent Labs     10/10/23  0845   AST 25   ALT 17   BILITOT 0.4   ALKPHOS 120     No results for input(s): \"INR\" in the last 72 hours. No results for input(s): \"CKTOTAL\", \"TROPONINI\" in the last 72 hours. No results for input(s): \"PROCAL\" in the last 72 hours. No results for input(s): \"LACTA\" in the last 72 hours. Microbiology:    Blood culture #1:   Lab Results   Component Value Date/Time    BC No growth-preliminary 01/02/2022 03:29 PM    BC  01/02/2022 03:29 PM     possible contamination; clinical correlation required    BC No growth-preliminary No growth 01/02/2022 03:29 PM     Blood culture #2:No results found for: \"BLOODCULT2\"  Organism:  Lab Results   Component Value Date/Time    ORG Lady albicans 02/14/2023 02:45 PM         Lab Results   Component Value Date/Time    LABGRAM  02/02/2023 03:55 PM     Moderate segmented neutrophils observed. Rare epithelial cells observed. Moderate gram negative bacilli. MRSA culture only:No results found for: \"MRSAC\"  Urine culture:   Lab Results   Component Value Date/Time    LABURIN  11/17/2022 04:25 PM     No growth-preliminary Mixed growth. The mixture of organisms present represents both organisms that may cause urinary tract infections and organisms that are not a common cause of urinary tract infections and are possibly skin john or distal urethral john. Respiratory culture: No results found for: \"CULTRESP\"  Aerobic and Anaerobic :  Lab Results   Component Value Date/Time    LABAERO No Acinetobacter species isolated.  02/02/2023 10:00 AM     No results found for: \"LABANAE\"    Urinalysis:      Lab Results   Component Value Date/Time    NITRU NEGATIVE 02/14/2023 02:45 PM    WBCUA > 200 02/14/2023 02:45 PM    BACTERIA FEW 02/14/2023 02:45 PM    RBCUA 5-10 02/14/2023 02:45 PM    BLOODU MODERATE 02/14/2023

## 2023-10-12 NOTE — ANESTHESIA PROCEDURE NOTES
Arterial Line:    An arterial line was placed using surface landmarks, in the OR for the following indication(s): continuous blood pressure monitoring and blood sampling needed. A 20 gauge (size) (length), Arrow (type) catheter was placed, Seldinger technique used, into the right radial artery, secured by tape and Tegaderm. Anesthesia type: General    Events:  patient tolerated procedure well with no complications and EBL < 5mL. 10/12/2023 8:35 AM10/12/2023 8:40 AM  Anesthesiologist: Dorian Garcia DO  Resident/CRNA: WYATT Hodegs CRNA  Performed: Resident/CRNA   Preanesthetic Checklist  Completed: patient identified, IV checked, site marked, risks and benefits discussed, surgical/procedural consents, equipment checked, pre-op evaluation, timeout performed, anesthesia consent given, oxygen available, monitors applied/VS acknowledged, fire risk safety assessment completed and verbalized and blood product R/B/A discussed and consented

## 2023-10-12 NOTE — PLAN OF CARE
Problem: Respiratory - Adult  Goal: Normal spontaneous ventilation  Note: Vent settings optimized to achieve target tidal volume, respiratory rate and ideal oxygen saturations. Will continue to wean as patient tolerates. SBT will be performed when appropriate.

## 2023-10-12 NOTE — PROGRESS NOTES
Patient oriented to Same Day department and admitted to Same Day Surgery room 5. Patient verbalized approval for first name, last initial with physician name on unit whiteboard. Plan of care reviewed with patient. Patient room whiteboard filled out and discussed with patient and responsible adult. Patient and responsible adult offered Same Day Welcome Packet to review. Call light in reach. Bed in lowest position, locked, with one bed rail up. SCDs and warming blanket in place. Appropriate arm bands on patient. Bathroom offered. All questions and concerns of patient addressed. Meds to Beds:   Patient informed of [facility]'s Meds to [facility] program during admission.  Patient is agreeable to program.   Contact information for the pharmacy and the Meds to Beds program:   Name: Katherine Medeiros   Relationship to patient:spouse/significant other   Phone number: in house

## 2023-10-12 NOTE — PROGRESS NOTES
Pharmacy Note  Vancomycin Consult    Manny Garcias is a 81 y.o. female started on Vancomycin for surgical prophylaxis (preop); consult received from [address] [place] [address] [place], CNP to manage therapy. Also receiving the following antibiotics: Zosyn, Metronidazole.     Patient Active Problem List   Diagnosis    Hypoxia    Debility    Physical deconditioning    History of COVID-19    Essential hypertension    Type 2 diabetes mellitus with insulin therapy ([address])    Morbid obesity with BMI of 40.0-44.9, adult ([address])    History of coronary artery disease    History of coronary artery stent placement    Cardiomyopathy ([address])    Posterior glottic stenosis    Tracheostomy in place [facility])    Hoarseness    Chronic respiratory failure with hypoxia ([address])    Coronary artery disease involving native heart    Acute on chronic anemia    Anxiety disorder    Gastroesophageal reflux disease    Other tracheostomy complication ([address])    Tracheal stenosis    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 ([address])    Laryngeal stenosis    Thrombocytopenia, unspecified ([address])    Torus mandibularis    Pulmonary embolism on right (HCC)    Nausea and vomiting    Abdominal bloating    Deep vein thrombosis (DVT) of both lower extremities (HCC)    Bilateral leg edema    Hyperlipidemia    PE (pulmonary thromboembolism) (HCC)    Leg swelling    Closed displaced intertrochanteric fracture of right femur (HCC)    Chronic bilateral low back pain without sciatica    Compression of lumbar vertebra (HCC)    Closed intertrochanteric fracture, right, initial encounter ([address])    Anemia associated with acute blood loss    Osteoporosis of lumbar spine    Congestive heart failure (HCC)    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

## 2023-10-12 NOTE — PROGRESS NOTES
1640 Patient arrived to unit from surgery via bed. Patient transferred to ICU bed and placed on continuous ICU bedside monitor. 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 Vitals obtained. See flowsheets. Patient's IV access includes 20 G left hand. Current infusions and rates of infusion include propofol at 50 mcg/kg/min. Assessment completed by Sultana Nesbitt. Two nurse skin assessment completed by Sultana Nesbitt  and [facility]. See flowsheets for assessment details. Policies and procedures of ICU unable to be explained to patient at this time. Family member(s)/representative(s) present at time of admission include spouse. Patient rights explained to family member(s)/representatives and patient, as able. Patient/patient's family member(s)/representative(s) N/A to have physician notified of their admission. All questions posed by patient's family member(s)/representative(s) and patient answered at this time. HIPPA code given to patient's spouse at this time. Rules and expectations about visitation given and explained.

## 2023-10-13 ENCOUNTER — APPOINTMENT (OUTPATIENT)
Dept: GENERAL RADIOLOGY | Age: 73
DRG: 163 | End: 2023-10-13
Attending: OTOLARYNGOLOGY
Payer: MEDICARE

## 2023-10-13 LAB
ACINETOBACTER CALCOACETICUS-BAUMANNII BY PCR: NOT DETECTED
ANION GAP SERPL CALC-SCNC: 10 MEQ/L (ref 8–16)
BLACTX-M ISLT/SPM QL: NORMAL
BLAIMP ISLT/SPM QL: NORMAL
BLAKPC ISLT/SPM QL: NORMAL
BLAOXA-48-LIKE ISLT/SPM QL: NORMAL
BLAVIM ISLT/SPM QL: NORMAL
BUN SERPL-MCNC: 14 MG/DL (ref 7–22)
C PNEUM DNA LOWER RESP QL NAA+NON-PROBE: NOT DETECTED
CA-I BLD ISE-SCNC: 1.16 MMOL/L (ref 1.12–1.32)
CALCIUM SERPL-MCNC: 6.5 MG/DL (ref 8.5–10.5)
CHLORIDE SERPL-SCNC: 110 MEQ/L (ref 98–111)
CO2 SERPL-SCNC: 20 MEQ/L (ref 23–33)
CREAT SERPL-MCNC: 0.6 MG/DL (ref 0.4–1.2)
DEPRECATED RDW RBC AUTO: 44.4 FL (ref 35–45)
ENTEROBACTER CLOACAE COMPLEX BY PCR: NOT DETECTED
ERYTHROCYTE [DISTWIDTH] IN BLOOD BY AUTOMATED COUNT: 13.4 % (ref 11.5–14.5)
ESCHERICHIA COLI BY PCR: NOT DETECTED
FLUAV RNA LOWER RESP QL NAA+NON-PROBE: NOT DETECTED
FLUBV RNA LOWER RESP QL NAA+NON-PROBE: NOT DETECTED
GFR SERPL CREATININE-BSD FRML MDRD: > 60 ML/MIN/1.73M2
GLUCOSE BLD STRIP.AUTO-MCNC: 171 MG/DL (ref 70–108)
GLUCOSE BLD STRIP.AUTO-MCNC: 182 MG/DL (ref 70–108)
GLUCOSE BLD STRIP.AUTO-MCNC: 187 MG/DL (ref 70–108)
GLUCOSE BLD STRIP.AUTO-MCNC: 188 MG/DL (ref 70–108)
GLUCOSE BLD STRIP.AUTO-MCNC: 210 MG/DL (ref 70–108)
GLUCOSE BLD STRIP.AUTO-MCNC: 221 MG/DL (ref 70–108)
GLUCOSE SERPL-MCNC: 252 MG/DL (ref 70–108)
HADV DNA LOWER RESP QL NAA+NON-PROBE: NOT DETECTED
HAEMOPHILUS INFLUENZAE BY PCR: NOT DETECTED
HCOV RNA LOWER RESP QL NAA+NON-PROBE: NOT DETECTED
HCT VFR BLD AUTO: 31.2 % (ref 37–47)
HGB BLD-MCNC: 10 GM/DL (ref 12–16)
HMPV RNA LOWER RESP QL NAA+NON-PROBE: NOT DETECTED
HPIV RNA LOWER RESP QL NAA+NON-PROBE: NOT DETECTED
KLEBSIELLA AEROGENES BY PCR: NOT DETECTED
KLEBSIELLA OXYTOCA BY PCR: NOT DETECTED
KLEBSIELLA PNEUMONIAE GROUP BY PCR: NOT DETECTED
L PNEUMO DNA LOWER RESP QL NAA+NON-PROBE: NOT DETECTED
M PNEUMO DNA LOWER RESP QL NAA+NON-PROBE: NOT DETECTED
MCH RBC QN AUTO: 29.2 PG (ref 26–33)
MCHC RBC AUTO-ENTMCNC: 32.1 GM/DL (ref 32.2–35.5)
MCV RBC AUTO: 91 FL (ref 81–99)
MORAXELLA CATARRHALIS BY PCR: NOT DETECTED
PLATELET # BLD AUTO: 215 THOU/MM3 (ref 130–400)
PMV BLD AUTO: 10.6 FL (ref 9.4–12.4)
POTASSIUM SERPL-SCNC: 5.1 MEQ/L (ref 3.5–5.2)
PROTEUS SPECIES BY PCR: NOT DETECTED
PSEUDOMONAS AERUGINOSA BY PCR: NOT DETECTED
RBC # BLD AUTO: 3.43 MILL/MM3 (ref 4.2–5.4)
RESISTANT GENE MECA/C & MREJ BY PCR: NORMAL
RESISTANT GENE NDM BY PCR: NORMAL
RSV RNA LOWER RESP QL NAA+NON-PROBE: NOT DETECTED
RV+EV RNA LOWER RESP QL NAA+NON-PROBE: NOT DETECTED
SERRATIA MARCESCENS BY PCR: NOT DETECTED
SODIUM SERPL-SCNC: 140 MEQ/L (ref 135–145)
SOURCE: NORMAL
SPECIMEN ACCEPTABILITY: NORMAL
STAPH AUREUS BY PCR: NOT DETECTED
STREP AGALACTIAE BY PCR: NOT DETECTED
STREP PNEUMONIAE BY PCR: NOT DETECTED
STREP PYOGENES BY PCR: NOT DETECTED
TRIGL SERPL-MCNC: 66 MG/DL (ref 0–199)
WBC # BLD AUTO: 11.6 THOU/MM3 (ref 4.8–10.8)

## 2023-10-13 PROCEDURE — 99291 CRITICAL CARE FIRST HOUR: CPT | Performed by: INTERNAL MEDICINE

## 2023-10-13 PROCEDURE — 84478 ASSAY OF TRIGLYCERIDES: CPT

## 2023-10-13 PROCEDURE — 99024 POSTOP FOLLOW-UP VISIT: CPT | Performed by: REGISTERED NURSE

## 2023-10-13 PROCEDURE — 6370000000 HC RX 637 (ALT 250 FOR IP): Performed by: STUDENT IN AN ORGANIZED HEALTH CARE EDUCATION/TRAINING PROGRAM

## 2023-10-13 PROCEDURE — 80048 BASIC METABOLIC PNL TOTAL CA: CPT

## 2023-10-13 PROCEDURE — 6360000002 HC RX W HCPCS: Performed by: NURSE PRACTITIONER

## 2023-10-13 PROCEDURE — 85027 COMPLETE CBC AUTOMATED: CPT

## 2023-10-13 PROCEDURE — 89220 SPUTUM SPECIMEN COLLECTION: CPT

## 2023-10-13 PROCEDURE — 2000000000 HC ICU R&B

## 2023-10-13 PROCEDURE — 36415 COLL VENOUS BLD VENIPUNCTURE: CPT

## 2023-10-13 PROCEDURE — 94003 VENT MGMT INPAT SUBQ DAY: CPT

## 2023-10-13 PROCEDURE — 71045 X-RAY EXAM CHEST 1 VIEW: CPT

## 2023-10-13 PROCEDURE — 94761 N-INVAS EAR/PLS OXIMETRY MLT: CPT

## 2023-10-13 PROCEDURE — 2580000003 HC RX 258: Performed by: NURSE PRACTITIONER

## 2023-10-13 PROCEDURE — 2700000000 HC OXYGEN THERAPY PER DAY

## 2023-10-13 PROCEDURE — 82330 ASSAY OF CALCIUM: CPT

## 2023-10-13 PROCEDURE — 82948 REAGENT STRIP/BLOOD GLUCOSE: CPT

## 2023-10-13 PROCEDURE — A4216 STERILE WATER/SALINE, 10 ML: HCPCS | Performed by: NURSE PRACTITIONER

## 2023-10-13 PROCEDURE — 2500000003 HC RX 250 WO HCPCS: Performed by: NURSE PRACTITIONER

## 2023-10-13 PROCEDURE — 6370000000 HC RX 637 (ALT 250 FOR IP): Performed by: NURSE PRACTITIONER

## 2023-10-13 RX ADMIN — PIPERACILLIN AND TAZOBACTAM 3375 MG: 3; .375 INJECTION, POWDER, LYOPHILIZED, FOR SOLUTION INTRAVENOUS at 14:05

## 2023-10-13 RX ADMIN — METRONIDAZOLE 500 MG: 500 INJECTION, SOLUTION INTRAVENOUS at 03:04

## 2023-10-13 RX ADMIN — SODIUM CHLORIDE: 9 INJECTION, SOLUTION INTRAVENOUS at 09:20

## 2023-10-13 RX ADMIN — PIPERACILLIN AND TAZOBACTAM 3375 MG: 3; .375 INJECTION, POWDER, LYOPHILIZED, FOR SOLUTION INTRAVENOUS at 23:03

## 2023-10-13 RX ADMIN — PROPOFOL 25 MCG/KG/MIN: 10 INJECTION, EMULSION INTRAVENOUS at 02:16

## 2023-10-13 RX ADMIN — PROPOFOL 45 MCG/KG/MIN: 10 INJECTION, EMULSION INTRAVENOUS at 22:57

## 2023-10-13 RX ADMIN — INSULIN LISPRO 2 UNITS: 100 INJECTION, SOLUTION INTRAVENOUS; SUBCUTANEOUS at 02:00

## 2023-10-13 RX ADMIN — INSULIN LISPRO 2 UNITS: 100 INJECTION, SOLUTION INTRAVENOUS; SUBCUTANEOUS at 05:59

## 2023-10-13 RX ADMIN — SODIUM CHLORIDE, PRESERVATIVE FREE 10 ML: 5 INJECTION INTRAVENOUS at 09:33

## 2023-10-13 RX ADMIN — VANCOMYCIN HYDROCHLORIDE 1500 MG: 5 INJECTION, POWDER, LYOPHILIZED, FOR SOLUTION INTRAVENOUS at 20:51

## 2023-10-13 RX ADMIN — SODIUM CHLORIDE, PRESERVATIVE FREE 20 MG: 5 INJECTION INTRAVENOUS at 21:18

## 2023-10-13 RX ADMIN — PROPOFOL 45 MCG/KG/MIN: 10 INJECTION, EMULSION INTRAVENOUS at 17:55

## 2023-10-13 RX ADMIN — PROPOFOL 40 MCG/KG/MIN: 10 INJECTION, EMULSION INTRAVENOUS at 14:11

## 2023-10-13 RX ADMIN — METRONIDAZOLE 500 MG: 500 INJECTION, SOLUTION INTRAVENOUS at 11:33

## 2023-10-13 RX ADMIN — CHLORHEXIDINE GLUCONATE 0.12% ORAL RINSE 15 ML: 1.2 LIQUID ORAL at 09:24

## 2023-10-13 RX ADMIN — CHLORHEXIDINE GLUCONATE 0.12% ORAL RINSE 15 ML: 1.2 LIQUID ORAL at 21:15

## 2023-10-13 RX ADMIN — SODIUM CHLORIDE, PRESERVATIVE FREE 20 MG: 5 INJECTION INTRAVENOUS at 09:24

## 2023-10-13 RX ADMIN — PIPERACILLIN AND TAZOBACTAM 3375 MG: 3; .375 INJECTION, POWDER, LYOPHILIZED, FOR SOLUTION INTRAVENOUS at 06:55

## 2023-10-13 RX ADMIN — PROPOFOL 40 MCG/KG/MIN: 10 INJECTION, EMULSION INTRAVENOUS at 08:27

## 2023-10-13 RX ADMIN — METRONIDAZOLE 500 MG: 500 INJECTION, SOLUTION INTRAVENOUS at 20:54

## 2023-10-13 RX ADMIN — SODIUM CHLORIDE, PRESERVATIVE FREE 10 ML: 5 INJECTION INTRAVENOUS at 22:59

## 2023-10-13 ASSESSMENT — PULMONARY FUNCTION TESTS
PIF_VALUE: 23
PIF_VALUE: 20
PIF_VALUE: 20
PIF_VALUE: 22
PIF_VALUE: 20
PIF_VALUE: 21
PIF_VALUE: 22

## 2023-10-13 NOTE — CARE COORDINATION
Case Management Assessment  Initial Evaluation    Date/Time of Evaluation: 10/13/2023 2:14 PM  Assessment Completed by: Quynh Reynolds RN    If patient is discharged prior to next notation, then this note serves as note for discharge by case management. 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 Yes    Chart Reviewed: Yes      History Provided by: Spouse  Patient Orientation: Sedated (on vent)    Patient Cognition: Other (see comment) (Sedated on vent)    Hospitalization in the last 30 days (Readmission):  No    If yes, Readmission Assessment in CM Navigator will be completed. Advance Directives:      Code Status: Full Code   Patient's Primary Decision Maker is: Legal Next of Kin    Primary Decision Maker (Active): Stein Naveed - [phone]    Discharge Planning:    Patient lives with: Spouse/Significant Other Type of Home: House  Primary Care Giver: Spouse  Patient Support Systems include: Spouse/Significant Other   Current Financial resources: Medicare, Other (Comment) ([employer] 2[employer] insurance)  Current community resources: None  Current services prior to admission: Durable Medical Equipment            Current DME: Walker, Bedside Commode            Type of Home Care services:  None    ADLS  Prior functional level: Assistance with the following:, Housework, Cooking, Bathing  Current functional level: Assistance with the following:, Cooking, Housework    Family can provide assistance at DC:  Yes  Would you like Case

## 2023-10-13 NOTE — PROGRESS NOTES
Comprehensive Nutrition Assessment    Type and Reason for Visit:  Initial, Consult (TF ordering and management - bolus feeds per ENT service)    Nutrition Recommendations/Plan:   Begin with 50ml bolus Vital 1.2 first feeding, if tolerated give 100ml second feeding and if tolerated increase to goal of 150ml third feeding - bolus every 4h  30ml free H20 before and after each bolus  If continues on TF after extubation and diprivan is off then recommend TF goal of 215ml every 4h to provide 1548 kcals, 97gms protein, 143gms cho, 7gms fiber, 1046ml free H20 in 1406ml total volume/24h  If pt. Is discharged on TF will provide education and adjust free H20 without IVFs     Malnutrition Assessment:  Malnutrition Status:  Insufficient data (10/13/23 1128)    Context:  Chronic Illness     Findings of the 6 clinical characteristics of malnutrition:  Energy Intake:  Unable to assess (pt. intubated)  Weight Loss:  Greater than 10% over 6 months (31# or 16% in 5 months)     Body Fat Loss:  Unable to assess (facial edema; RN asked not to disturb pt. this am)     Muscle Mass Loss:  Unable to assess    Fluid Accumulation:  Mild     Strength:  Not Performed    Nutrition Assessment:     Pt. nutritionally compromised AEB NPO, intubated s/p ENT surgery 10/12, need for nutrition support.   At risk for further nutrition compromise r/t admit d/t tracheal stenosis from trach placed 4/2022, complex COVID Hx and underlying medical condition DM - A1c 5.5% 1/2023, CAD,THR 2/2023      Nutrition Related Findings:    Pt. Report/Treatments/Miscellaneous: pt. Seen this am; no family at bedside; spoke with CNP for ENT - will begin bolus feeds today; spoke with RN to clarify orders; weight loss per EMR; UO 1150ml;   GI Status: BM 0  Pertinent Labs: glucose 200  BUN 14  creatinine 0.6  Na 140  K+ 5.1  triglycerides 66    Pertinent Meds: diprivan, humalog, pepcid, flagyl, zosyn, IVF at 100ml/hour 0.9NS    Wound Type: Pressure Injury, Stage II (coccyx;

## 2023-10-13 NOTE — OP NOTE
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 ([address]) [J95.03]     * Posterior glottic stenosis [J38.6]     * Tracheostomy dependence ([address]) [Z93.0]    Post-Op Diagnosis: Same       Procedure(s):  Cricotracheal Resection, Laryngoplasty with Flap and Vocal Fold Lateralization Stitch Placement    Surgeon(s):  Dora Hester MD    Assistant:   Surgical Assistant: Patrick Lynne Assistant: Nick Guan    Anesthesia: General    Estimated Blood Loss (mL): less than 896     Complications: Other: None    Specimens:   ID Type Source Tests Collected by Time Destination   A : Excess skin and subcutaneous tissue Tissue Neck SURGICAL PATHOLOGY Dora Hester MD 10/12/2023 0920    B : ANTERIOR CRICOID RING Tissue Neck SURGICAL PATHOLOGY Dora Hester MD 10/12/2023 1046    C : SEGMENTAL CRICOID RESECTION OF POSTERIOR RIM- CRICOID Tissue Neck SURGICAL PATHOLOGY Dora Hester MD 10/12/2023 1116        Implants:  Implant Name Type Inv. Item Serial No.  Lot No. LRB No. Used Action   SEALANT TISS 10 CC FIBRIN VISTASEAL - [id number]  SEALANT TISS 10 CC FIBRIN VISTASEAL  [facility] [id number] N/A 1 Implanted         Drains:   Closed/Suction Drain Left; Anterior Neck Bulb (Active)   Site Description Clean, dry & intact 10/12/23 2031   Drainage Appearance Bloody 10/12/23 2031   Drain Status Compressed 10/12/23 2031   Output (ml) 20 ml 10/12/23 2031       Closed/Suction Drain Right; Anterior Neck Bulb (Active)   Site Description Clean, dry & intact 10/12/23 2031   Drainage Appearance Bloody 10/12/23 2031   Drain Status Compressed 10/12/23 2031       NG/OG/NJ/NE Tube Nasogastric Left nostril (Active)   Surrounding Skin Clean, dry & intact 10/12/23 1944   Securement device Bridle 10/12/23 1944   Status Suction-low intermittent 10/12/23 1944 holding vacuum at the end of the case. The patient's drapes were removed and the patient was prepared for emergence and extubation in the operating room. This included observing the patient for a protracted period of time until she was responsive and following commands. Preparations were made for the potential for reintubation as is standard for the care of post tracheal resection patient's with the added concern that the patient had no leak at the time of the pressure test and that full on movement of the cords was not directly observed, the second factor of which that does happen with some regularity. The patient was extubated and for short period of time appeared to be able to manage adequate tidal flows but quickly started to produce smaller and smaller tidal volumes until she began to desaturate. Mask support was given to the patient to improve respiration which function to restore her saturations but did not appear to be commensurate with the patient's improved ability to breathe for himself. As such given this contingency, plans were already in place to reintubate the patient with a 5-0 MLT endotracheal tube and be taken to the surgical ICU intubated sedated and mechanically ventilated. She was considered stable on arrival to the unit. Estimated blood loss in the case was approximately 100 cc and the patient received 2-1/2 L of crystalloid intraoperatively. No blood products were transfused. Needle counts were considered accurate x3. No intraoperative complications were detected. The patient was reintubated at the end of the case as is considered a potential part of the process of restoring the patient's airway with intraoperative edema supervening the option to extubate. I was present for and performed the patient's entire operation myself.     Disposition: The patient be admitted to the ICU for close critical care and pulmonary toilet in preparation for another attempt at extubation

## 2023-10-13 NOTE — ANESTHESIA POSTPROCEDURE EVALUATION
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 Patricia Phase I: Patricia Score: 10    Patricia Phase II:        Anesthesia Post Evaluation    Patient location during evaluation: ICU  Patient participation: waiting for patient participation  Level of consciousness: sedated and ventilated  Airway patency: intubated on vent.   Complications: no  Cardiovascular status: hemodynamically stable  Respiratory status: ventilator  Hydration status: stable

## 2023-10-13 NOTE — PROGRESS NOTES
[facility] Continence Nurse  Progress Note       Samm Flannery  AGE: 81 y.o. GENDER: female  : 1950  UNIT: 4D-08/008-A  TODAY'S DATE:  10/13/2023  ADMISSION DATE: 10/12/2023  6:19 AM    Summary:      Consult received for wounds on admission. Documentation reviewed, which indicates patient has stage 1 and 2 pressure injuries to coccyx and buttock, as well as skin tear to abdominal fold. Recommend staff to utilize Lázaro and Wound Care order sets. See below. Continue to turn every 2 hours and PRN, offload with pillows, ensure patient is on low air loss support surface [employer], SPR+, Janelle, Bariatric bed), utilize moisture wicking underpads, limit use of depends, and if applicable, use waffle cushion when in chair. If wound evolves during hospital admission, please call. Recommendation:  Triad to wounds BID and PRN. Bordered foam dressings for abdominal fold, to be change daily. How to: Lázaro and Wound Care Orders         Type WOUND in order set search bar. Right click Wound Care Orders, select add to favorites. Right click GEN Lázaro Scale Focused, select add to favorites. Check GEN Lázaro Scale Focus and Wound Care Orders. Select appropriate orders. Sign orders as Per Protocol: No Cosign Required. These are independent nursing orders.

## 2023-10-13 NOTE — PLAN OF CARE
Problem: Safety - Medical Restraint  Goal: Remains free of injury from restraints (Restraint for Interference with Medical Device)  Description: INTERVENTIONS:  1. Determine that other, less restrictive measures have been tried or would not be effective before applying the restraint  2. Evaluate the patient's condition at the time of restraint application  3. Inform patient/family regarding the reason for restraint  4. Q2H: Monitor safety, psychosocial status, comfort, nutrition and hydration  10/13/2023 0516 by Rossy Damian RN  Outcome: Not Progressing  Flowsheets (Taken 10/13/2023 0515)  Remains free of injury from restraints (restraint for interference with medical device): Inform patient/family regarding the reason for restraint   Every 2 hours: Monitor safety, psychosocial status, comfort, nutrition and hydration   Evaluate the patient's condition at the time of restraint application   Determine that other, less restrictive measures have been tried or would not be effective before applying the restraint  10/13/2023 0105 by Rossy Damian RN  Outcome: [address] Resolved Not Met   Care plan discussed with patient. Patient intubated does not understand plan of care at this time.

## 2023-10-13 NOTE — PLAN OF CARE
Problem: Respiratory - Adult  Goal: Normal spontaneous ventilation  10/13/2023 0555 by Cassandra Greene RCP  Outcome: Progressing                                                Patient Weaning Progress    The patient's vent settings was able to be weaned this shift. Ventilator settings that were weaned              [] Mode   [] Pressure support weaned   [x] Fio2 weaned   [] Peep weaned      Spontaneous weaning trial  was not attempted. due to defined parameters for SBT (spontaneous breathing trial) not being met. Reason that defined ventilator parameters for SBT was not met              [] Patient condition requires increased ventilator settings  [] Requires increased sedation   [x] Settings not within weaning range   [] SAT not completed   [] Physician orders    Cuff was not  deflated to determine cuff leak. Unable to get agreement for goals because no family is present and patient cannot respond.

## 2023-10-13 NOTE — PLAN OF CARE
Problem: Safety - Medical Restraint  Goal: Remains free of injury from restraints (Restraint for Interference with Medical Device)  Description: INTERVENTIONS:  1. Determine that other, less restrictive measures have been tried or would not be effective before applying the restraint  2. Evaluate the patient's condition at the time of restraint application  3. Inform patient/family regarding the reason for restraint  4.  Q2H: Monitor safety, psychosocial status, comfort, nutrition and hydration  Outcome: [address] [facility] 114 Resolved Not Met     Problem: Discharge Planning  Goal: Discharge to home or other facility with appropriate resources  Outcome: Progressing  Flowsheets (Taken 10/12/2023 1944)  Discharge to home or other facility with appropriate resources: Identify discharge learning needs (meds, wound care, etc)     Problem: Chronic Conditions and Co-morbidities  Goal: Patient's chronic conditions and co-morbidity symptoms are monitored and maintained or improved  Outcome: Progressing  Flowsheets (Taken 10/12/2023 1944)  Care Plan - Patient's Chronic Conditions and Co-Morbidity Symptoms are Monitored and Maintained or Improved:   Monitor and assess patient's chronic conditions and comorbid symptoms for stability, deterioration, or improvement   Collaborate with multidisciplinary team to address chronic and comorbid conditions and prevent exacerbation or deterioration   Update acute care plan with appropriate goals if chronic or comorbid symptoms are exacerbated and prevent overall improvement and discharge     Problem: Safety - Adult  Goal: Free from fall injury  Outcome: Progressing     Problem: Pain  Goal: Verbalizes/displays adequate comfort level or baseline comfort level  Outcome: Progressing  Flowsheets (Taken 10/12/2023 1944)  Verbalizes/displays adequate comfort level or baseline comfort level:   Encourage patient to monitor pain and request assistance   Assess pain using appropriate pain scale   Implement

## 2023-10-14 ENCOUNTER — APPOINTMENT (OUTPATIENT)
Dept: GENERAL RADIOLOGY | Age: 73
DRG: 163 | End: 2023-10-14
Attending: OTOLARYNGOLOGY
Payer: MEDICARE

## 2023-10-14 PROBLEM — J95.821 POSTOPERATIVE ACUTE RESPIRATORY FAILURE (HCC): Status: ACTIVE | Noted: 2022-04-13

## 2023-10-14 LAB
ANION GAP SERPL CALC-SCNC: 9 MEQ/L (ref 8–16)
APTT PPP: 25.2 SECONDS (ref 22–38)
ARTERIAL PATENCY WRIST A: ABNORMAL
BACTERIA SPEC AEROBE CULT: NORMAL
BACTERIA UR CULT: NORMAL
BASE EXCESS BLDA CALC-SCNC: -2.9 MMOL/L (ref -2.5–2.5)
BDY SITE: ABNORMAL
BREATHS SETTING VENT: 12 BPM
BUN SERPL-MCNC: 16 MG/DL (ref 7–22)
CALCIUM SERPL-MCNC: 8.1 MG/DL (ref 8.5–10.5)
CHLORIDE SERPL-SCNC: 114 MEQ/L (ref 98–111)
CO2 SERPL-SCNC: 21 MEQ/L (ref 23–33)
COLLECTED BY:: ABNORMAL
CREAT SERPL-MCNC: 0.7 MG/DL (ref 0.4–1.2)
DEPRECATED RDW RBC AUTO: 46.6 FL (ref 35–45)
DEVICE: ABNORMAL
ERYTHROCYTE [DISTWIDTH] IN BLOOD BY AUTOMATED COUNT: 13.8 % (ref 11.5–14.5)
FIO2 ON VENT O2 ANALYZER: 21 %
GFR SERPL CREATININE-BSD FRML MDRD: > 60 ML/MIN/1.73M2
GLUCOSE BLD STRIP.AUTO-MCNC: 134 MG/DL (ref 70–108)
GLUCOSE BLD STRIP.AUTO-MCNC: 135 MG/DL (ref 70–108)
GLUCOSE BLD STRIP.AUTO-MCNC: 154 MG/DL (ref 70–108)
GLUCOSE BLD STRIP.AUTO-MCNC: 160 MG/DL (ref 70–108)
GLUCOSE SERPL-MCNC: 196 MG/DL (ref 70–108)
HCO3 BLDA-SCNC: 22 MMOL/L (ref 23–28)
HCT VFR BLD AUTO: 29.5 % (ref 37–47)
HEPARIN UNFRACTIONATED: 0.71 U/ML (ref 0.3–0.7)
HEPARIN UNFRACTIONATED: < 0.04 U/ML (ref 0.3–0.7)
HGB BLD-MCNC: 9.3 GM/DL (ref 12–16)
INR PPP: 0.96 (ref 0.85–1.13)
MCH RBC QN AUTO: 29.2 PG (ref 26–33)
MCHC RBC AUTO-ENTMCNC: 31.5 GM/DL (ref 32.2–35.5)
MCV RBC AUTO: 92.5 FL (ref 81–99)
PCO2 BLDA: 36 MMHG (ref 35–45)
PEEP SETTING VENT: 6 MMHG
PH BLDA: 7.39 [PH] (ref 7.35–7.45)
PIP: 22 CMH2O
PLATELET # BLD AUTO: 201 THOU/MM3 (ref 130–400)
PMV BLD AUTO: 10.2 FL (ref 9.4–12.4)
PO2 BLDA: 85 MMHG (ref 71–104)
POTASSIUM SERPL-SCNC: 4 MEQ/L (ref 3.5–5.2)
RBC # BLD AUTO: 3.19 MILL/MM3 (ref 4.2–5.4)
SAO2 % BLDA: 96 %
SODIUM SERPL-SCNC: 144 MEQ/L (ref 135–145)
VANCOMYCIN SERPL-MCNC: 13.7 UG/ML (ref 0.1–39.9)
VENTILATION MODE VENT: ABNORMAL
WBC # BLD AUTO: 10 THOU/MM3 (ref 4.8–10.8)

## 2023-10-14 PROCEDURE — 71045 X-RAY EXAM CHEST 1 VIEW: CPT

## 2023-10-14 PROCEDURE — 85730 THROMBOPLASTIN TIME PARTIAL: CPT

## 2023-10-14 PROCEDURE — 36415 COLL VENOUS BLD VENIPUNCTURE: CPT

## 2023-10-14 PROCEDURE — 6360000002 HC RX W HCPCS: Performed by: NURSE PRACTITIONER

## 2023-10-14 PROCEDURE — 6360000002 HC RX W HCPCS: Performed by: PHARMACIST

## 2023-10-14 PROCEDURE — 82948 REAGENT STRIP/BLOOD GLUCOSE: CPT

## 2023-10-14 PROCEDURE — A4216 STERILE WATER/SALINE, 10 ML: HCPCS | Performed by: NURSE PRACTITIONER

## 2023-10-14 PROCEDURE — 80202 ASSAY OF VANCOMYCIN: CPT

## 2023-10-14 PROCEDURE — 2700000000 HC OXYGEN THERAPY PER DAY

## 2023-10-14 PROCEDURE — 2500000003 HC RX 250 WO HCPCS: Performed by: EMERGENCY MEDICINE

## 2023-10-14 PROCEDURE — 99291 CRITICAL CARE FIRST HOUR: CPT | Performed by: INTERNAL MEDICINE

## 2023-10-14 PROCEDURE — 85610 PROTHROMBIN TIME: CPT

## 2023-10-14 PROCEDURE — 6360000002 HC RX W HCPCS: Performed by: EMERGENCY MEDICINE

## 2023-10-14 PROCEDURE — 85520 HEPARIN ASSAY: CPT

## 2023-10-14 PROCEDURE — 37799 UNLISTED PX VASCULAR SURGERY: CPT

## 2023-10-14 PROCEDURE — 94761 N-INVAS EAR/PLS OXIMETRY MLT: CPT

## 2023-10-14 PROCEDURE — 2000000000 HC ICU R&B

## 2023-10-14 PROCEDURE — 2580000003 HC RX 258: Performed by: PHARMACIST

## 2023-10-14 PROCEDURE — 2500000003 HC RX 250 WO HCPCS: Performed by: NURSE PRACTITIONER

## 2023-10-14 PROCEDURE — 2580000003 HC RX 258: Performed by: NURSE PRACTITIONER

## 2023-10-14 PROCEDURE — 82803 BLOOD GASES ANY COMBINATION: CPT

## 2023-10-14 PROCEDURE — 80048 BASIC METABOLIC PNL TOTAL CA: CPT

## 2023-10-14 PROCEDURE — 94003 VENT MGMT INPAT SUBQ DAY: CPT

## 2023-10-14 PROCEDURE — 6370000000 HC RX 637 (ALT 250 FOR IP): Performed by: NURSE PRACTITIONER

## 2023-10-14 PROCEDURE — 85027 COMPLETE CBC AUTOMATED: CPT

## 2023-10-14 RX ORDER — BUMETANIDE 0.25 MG/ML
1 INJECTION INTRAMUSCULAR; INTRAVENOUS ONCE
Status: COMPLETED | OUTPATIENT
Start: 2023-10-14 | End: 2023-10-14

## 2023-10-14 RX ORDER — HEPARIN SODIUM 10000 [USP'U]/100ML
5-30 INJECTION, SOLUTION INTRAVENOUS CONTINUOUS
Status: DISCONTINUED | OUTPATIENT
Start: 2023-10-14 | End: 2023-10-16

## 2023-10-14 RX ADMIN — PIPERACILLIN AND TAZOBACTAM 3375 MG: 3; .375 INJECTION, POWDER, LYOPHILIZED, FOR SOLUTION INTRAVENOUS at 22:38

## 2023-10-14 RX ADMIN — CHLORHEXIDINE GLUCONATE 0.12% ORAL RINSE 15 ML: 1.2 LIQUID ORAL at 20:57

## 2023-10-14 RX ADMIN — VANCOMYCIN HYDROCHLORIDE 1000 MG: 1 INJECTION, POWDER, LYOPHILIZED, FOR SOLUTION INTRAVENOUS at 10:50

## 2023-10-14 RX ADMIN — SODIUM CHLORIDE: 9 INJECTION, SOLUTION INTRAVENOUS at 03:49

## 2023-10-14 RX ADMIN — HEPARIN SODIUM 18 UNITS/KG/HR: 10000 INJECTION, SOLUTION INTRAVENOUS at 16:37

## 2023-10-14 RX ADMIN — PIPERACILLIN AND TAZOBACTAM 3375 MG: 3; .375 INJECTION, POWDER, LYOPHILIZED, FOR SOLUTION INTRAVENOUS at 14:32

## 2023-10-14 RX ADMIN — METRONIDAZOLE 500 MG: 500 INJECTION, SOLUTION INTRAVENOUS at 04:37

## 2023-10-14 RX ADMIN — PIPERACILLIN AND TAZOBACTAM 3375 MG: 3; .375 INJECTION, POWDER, LYOPHILIZED, FOR SOLUTION INTRAVENOUS at 06:57

## 2023-10-14 RX ADMIN — PROPOFOL 30 MCG/KG/MIN: 10 INJECTION, EMULSION INTRAVENOUS at 14:58

## 2023-10-14 RX ADMIN — SODIUM CHLORIDE, PRESERVATIVE FREE 10 ML: 5 INJECTION INTRAVENOUS at 07:55

## 2023-10-14 RX ADMIN — VANCOMYCIN HYDROCHLORIDE 1000 MG: 1 INJECTION, POWDER, LYOPHILIZED, FOR SOLUTION INTRAVENOUS at 21:29

## 2023-10-14 RX ADMIN — PROPOFOL 45 MCG/KG/MIN: 10 INJECTION, EMULSION INTRAVENOUS at 07:54

## 2023-10-14 RX ADMIN — BUMETANIDE 1 MG: 0.25 INJECTION INTRAMUSCULAR; INTRAVENOUS at 15:18

## 2023-10-14 RX ADMIN — PROPOFOL 45 MCG/KG/MIN: 10 INJECTION, EMULSION INTRAVENOUS at 04:31

## 2023-10-14 RX ADMIN — CHLORHEXIDINE GLUCONATE 0.12% ORAL RINSE 15 ML: 1.2 LIQUID ORAL at 07:56

## 2023-10-14 RX ADMIN — PROPOFOL 25 MCG/KG/MIN: 10 INJECTION, EMULSION INTRAVENOUS at 20:14

## 2023-10-14 RX ADMIN — SODIUM CHLORIDE, PRESERVATIVE FREE 20 MG: 5 INJECTION INTRAVENOUS at 20:56

## 2023-10-14 RX ADMIN — METRONIDAZOLE 500 MG: 500 INJECTION, SOLUTION INTRAVENOUS at 20:24

## 2023-10-14 RX ADMIN — SODIUM CHLORIDE, PRESERVATIVE FREE 10 ML: 5 INJECTION INTRAVENOUS at 20:24

## 2023-10-14 RX ADMIN — SODIUM CHLORIDE, PRESERVATIVE FREE 20 MG: 5 INJECTION INTRAVENOUS at 07:56

## 2023-10-14 RX ADMIN — METRONIDAZOLE 500 MG: 500 INJECTION, SOLUTION INTRAVENOUS at 13:15

## 2023-10-14 ASSESSMENT — PULMONARY FUNCTION TESTS
PIF_VALUE: 22
PIF_VALUE: 23
PIF_VALUE: 22

## 2023-10-14 NOTE — SIGNIFICANT EVENT
Bloody sputum emerging from the mouth, outside of the ETT, noted by Pt's nurse. Dr Nayeli Herrmann and I discussed this Pt at bedside. She was recently Dx with b/l DVT's, and was kept on SCD's but was not on anticoagulation. We decided to start her on therapeutic heparin. STAT CXR ordered, significant for pulmonary vascular congestion. This Pt has a h/o HFpEF. IVF were stopped and 1 mg Bumex ordered. POCUS significant for B lines in b/l lungs, as well as a dilated IVC with decreased collapsibility, suggesting volume overload. No R heart strain appreciated on POCUS. I suspect this sputum is from pulmonary edema. We will consider further diuresis. Pt is currently satting in the high 90's on 21% FiO2 and maintaining adequate tidal volumes.

## 2023-10-14 NOTE — PROGRESS NOTES
CRITICAL CARE PROGRESS NOTE      Patient:  Edmund oCttrell    Unit/Bed:4D-08/008-A  YOB: 1950  MRN: [id number]   PCP: Luciana Romero MD  Date of Admission: 10/12/2023  Chief Complaint:- Tracheal stenosis s/p resection    Assessment and Plan:    S/p cricotracheal resection, laryngoplasty with flap and vocal fold lateralization stitch placement:  POD #2 with Dr Emanuel Sears, ENT. H/o complex COVID-19-->trach-->tracheal stenosis. Pt is trach dependent historically, has posterior glottic stenosis, and cricoarytenoid join fixation. Current trach in place with a 5.0 ETT and b/l bulb suction drains. ICU monitoring  Propofol @ 35. Goal RASS: -2 to -3, in order to minimize movement and involuntary coughing  2 pillows to help maintain forward flexion of the neck  112 cc total out from drains   Strict drain output documentation. Notify clinician immediately if bulbs lose suction  NO steroids  Right main stem intubation, resolved  Leukocystosis:  11.6 (16.9). Suspected 2/2 steroid use. Afebrile. No obvious infection  Continue Zosyn, Vanc, Flagyl per ENT  DM type 2:  A1c 5.5 in January 2023. Not currently on insulin at home. Medium dose sliding scale  Hypoglycemia protocol in place  Chronic diastolic CHF:  ECHO 44/45/62, EF 60-65% with LV WMA's and mildly dilated LA. [facility] cardiology. CAD:  S/p PCI. Stress test 06/22 negative for ischemia. 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 Emanuel Sears.  Pt is POD #0 and transferred to ICU

## 2023-10-14 NOTE — PLAN OF CARE
Problem: Respiratory - Adult  Goal: Normal spontaneous ventilation  Outcome: Progressing                                                Patient Weaning Progress    The patient's vent settings was able to be weaned this shift. Ventilator settings that were weaned              [] Mode   [] Pressure support weaned   [x] Fio2 weaned   [] Peep weaned      Spontaneous weaning trial  was not attempted. due to defined parameters for SBT (spontaneous breathing trial) not being met. Reason that defined ventilator parameters for SBT was not met              [] Patient condition requires increased ventilator settings  [] Requires increased sedation   [x] Settings not within weaning range   [] SAT not completed   [] Physician orders    Evac tube was not  hooked up with continuous low suction(20-30mmHg)      Cuff was not  deflated to determine cuff leak. Unable to get agreement for goals because no family is present and patient cannot respond.

## 2023-10-14 NOTE — PLAN OF CARE
Problem: Safety - Medical Restraint  Goal: Remains free of injury from restraints (Restraint for Interference with Medical Device)  Description: INTERVENTIONS:  1. Determine that other, less restrictive measures have been tried or would not be effective before applying the restraint  2. Evaluate the patient's condition at the time of restraint application  3. Inform patient/family regarding the reason for restraint  4. Q2H: Monitor safety, psychosocial status, comfort, nutrition and hydration  10/14/2023 0903 by Fatemeh Soto RN  Outcome: Not Progressing  10/13/2023 2334 by Daisy Balderas RN  Outcome: Progressing     Problem: Safety - Medical Restraint  Goal: Remains free of injury from restraints (Restraint for Interference with Medical Device)  Description: INTERVENTIONS:  1. Determine that other, less restrictive measures have been tried or would not be effective before applying the restraint  2. Evaluate the patient's condition at the time of restraint application  3. Inform patient/family regarding the reason for restraint  4.  Q2H: Monitor safety, psychosocial status, comfort, nutrition and hydration  10/14/2023 0903 by Fatemeh Soto RN  Outcome: Not Progressing  10/13/2023 2334 by Daisy Balderas RN  Outcome: Progressing

## 2023-10-14 NOTE — PLAN OF CARE
Problem: Respiratory - Adult  Goal: Normal spontaneous ventilation  10/14/2023 1308 by Og Donato RCP  Outcome: Progressing                                                Patient Weaning Progress    The patient's vent settings was able to be weaned this shift. Ventilator settings that were weaned              [] Mode   [] Pressure support weaned   [x] Fio2 weaned   [] Peep weaned      Spontaneous weaning trial  was not attempted, due to defined parameters for SBT (spontaneous breathing trial) not being met. Reason that defined ventilator parameters for SBT was not met              [] Patient condition requires increased ventilator settings  [] Requires increased sedation   [] Settings not within weaning range   [x] SAT not completed   [] Physician orders    Unable to get agreement for goals because no family is present and patient cannot respond. ET tube is at the 22 CM per order.

## 2023-10-14 NOTE — PROGRESS NOTES
[facility]--. [id number] [facility] PROGRESS NOTE      Patient: Chaitanya Gould  Room #: 4D-08/008-A            YOB: 1950  Age: 81 y.o. Gender: female            Admit Date & Time: 10/12/2023  6:19 AM    Assessment:    The patient was asleep and intubated at the time of this attempted visit. Interventions: The patient was provided silent prayer. Outcomes: The patient remains sleeping at the end of the visit. Plan: 1. Spiritual care will continue to follow the patient according to [facility]'s spiritual care SOP.        Electronically signed by Paul Mancia on 10/14/2023 at 12:37 PM.  [place]eno  [phone]     10/14/23 1236   Encounter Summary   Encounter Overview/Reason  Initial Encounter   Service Provided For: Patient   [facility]   Support System Unknown   Last Encounter  10/14/23  (NR)   Complexity of Encounter Low   Begin Time 1020   End Time  1021   Total Time Calculated 1 min   Spiritual/Emotional needs   Type Spiritual Support   Assessment/Intervention/Outcome   Assessment Unable to assess   Intervention Prayer (assurance of)/[place]   Outcome Did not respond

## 2023-10-14 NOTE — PLAN OF CARE
Problem: Safety - Medical Restraint  Goal: Remains free of injury from restraints (Restraint for Interference with Medical Device)  Description: INTERVENTIONS:  1. Determine that other, less restrictive measures have been tried or would not be effective before applying the restraint  2. Evaluate the patient's condition at the time of restraint application  3. Inform patient/family regarding the reason for restraint  4. Q2H: Monitor safety, psychosocial status, comfort, nutrition and hydration  10/14/2023 0903 by Beth Meier RN  Outcome: Not Progressing  10/13/2023 2334 by Ishaan Reed RN  Outcome: Progressing     Problem: Safety - Medical Restraint  Goal: Remains free of injury from restraints (Restraint for Interference with Medical Device)  Description: INTERVENTIONS:  1. Determine that other, less restrictive measures have been tried or would not be effective before applying the restraint  2. Evaluate the patient's condition at the time of restraint application  3. Inform patient/family regarding the reason for restraint  4.  Q2H: Monitor safety, psychosocial status, comfort, nutrition and hydration  10/14/2023 0903 by Beth Meier RN  Outcome: Not Progressing  10/13/2023 2334 by Ishaan Reed RN  Outcome: Progressing

## 2023-10-14 NOTE — PROGRESS NOTES
Nurse reported 2 episodes of blood in her mouth in the last hour prior to my seeing her. There was no more bleeding at this time. She showed it to me and I look like mucus mixed with blood, bright red. She remains intubated with a pulse ox of 98% on an FiO2 of 21%. Nurse asked about anticoagulation and her documented DVTs in lower leg. Anticoagulants have been held because of the surgery. They of course are not placing her on SCDs because of the blood clots in her calves. It was also reported that Dr. Lit Carballo had suggested some diuresis because of the amount of fluid she received in surgery. I suggested a portable chest x-ray which showed some vascular congestion in her lungs. Lung fields were otherwise clear. I discussed the above with Dr. Lit Carballo who stated it would be okay to place her on a fairly low-dose heparin drip that could be stopped immediately if she had bleeding, or perhaps Lovenox. I told the ICU resident that the heparin drip would be the most practical because if she did started bleeding it could be stopped. In that event I suggested they contact IR regarding placement of an inferior vena cava filter. Because of the bleeding in the untreated DVTs, I suggested not extubating her today. .  All of the above was also cleared with Dr. Lit Carballo. Mary Fears

## 2023-10-15 ENCOUNTER — APPOINTMENT (OUTPATIENT)
Dept: GENERAL RADIOLOGY | Age: 73
DRG: 163 | End: 2023-10-15
Attending: OTOLARYNGOLOGY
Payer: MEDICARE

## 2023-10-15 LAB
ANION GAP SERPL CALC-SCNC: 11 MEQ/L (ref 8–16)
BACTERIA SPEC RESP CULT: NORMAL
BUN SERPL-MCNC: 15 MG/DL (ref 7–22)
CA-I BLD ISE-SCNC: 1.18 MMOL/L (ref 1.12–1.32)
CALCIUM SERPL-MCNC: 7.9 MG/DL (ref 8.5–10.5)
CHLORIDE SERPL-SCNC: 112 MEQ/L (ref 98–111)
CO2 SERPL-SCNC: 21 MEQ/L (ref 23–33)
CREAT SERPL-MCNC: 0.7 MG/DL (ref 0.4–1.2)
DEPRECATED RDW RBC AUTO: 47.5 FL (ref 35–45)
ERYTHROCYTE [DISTWIDTH] IN BLOOD BY AUTOMATED COUNT: 14 % (ref 11.5–14.5)
GFR SERPL CREATININE-BSD FRML MDRD: > 60 ML/MIN/1.73M2
GLUCOSE BLD STRIP.AUTO-MCNC: 148 MG/DL (ref 70–108)
GLUCOSE BLD STRIP.AUTO-MCNC: 154 MG/DL (ref 70–108)
GLUCOSE BLD STRIP.AUTO-MCNC: 168 MG/DL (ref 70–108)
GLUCOSE BLD STRIP.AUTO-MCNC: 169 MG/DL (ref 70–108)
GLUCOSE BLD STRIP.AUTO-MCNC: 183 MG/DL (ref 70–108)
GLUCOSE BLD STRIP.AUTO-MCNC: 192 MG/DL (ref 70–108)
GLUCOSE SERPL-MCNC: 208 MG/DL (ref 70–108)
GRAM STN SPEC: NORMAL
HCT VFR BLD AUTO: 29.6 % (ref 37–47)
HEPARIN UNFRACTIONATED: 0.71 U/ML (ref 0.3–0.7)
HEPARIN UNFRACTIONATED: 0.77 U/ML (ref 0.3–0.7)
HEPARIN UNFRACTIONATED: 0.94 U/ML (ref 0.3–0.7)
HGB BLD-MCNC: 9.4 GM/DL (ref 12–16)
MAGNESIUM SERPL-MCNC: 1.6 MG/DL (ref 1.6–2.4)
MCH RBC QN AUTO: 29.2 PG (ref 26–33)
MCHC RBC AUTO-ENTMCNC: 31.8 GM/DL (ref 32.2–35.5)
MCV RBC AUTO: 91.9 FL (ref 81–99)
PLATELET # BLD AUTO: 213 THOU/MM3 (ref 130–400)
PMV BLD AUTO: 10.2 FL (ref 9.4–12.4)
POTASSIUM SERPL-SCNC: 3.6 MEQ/L (ref 3.5–5.2)
RBC # BLD AUTO: 3.22 MILL/MM3 (ref 4.2–5.4)
SODIUM SERPL-SCNC: 144 MEQ/L (ref 135–145)
TRIGL SERPL-MCNC: 183 MG/DL (ref 0–199)
WBC # BLD AUTO: 9 THOU/MM3 (ref 4.8–10.8)

## 2023-10-15 PROCEDURE — 2580000003 HC RX 258: Performed by: NURSE PRACTITIONER

## 2023-10-15 PROCEDURE — 82330 ASSAY OF CALCIUM: CPT

## 2023-10-15 PROCEDURE — 99291 CRITICAL CARE FIRST HOUR: CPT | Performed by: INTERNAL MEDICINE

## 2023-10-15 PROCEDURE — A4216 STERILE WATER/SALINE, 10 ML: HCPCS | Performed by: NURSE PRACTITIONER

## 2023-10-15 PROCEDURE — 6360000002 HC RX W HCPCS: Performed by: NURSE PRACTITIONER

## 2023-10-15 PROCEDURE — 6360000002 HC RX W HCPCS: Performed by: EMERGENCY MEDICINE

## 2023-10-15 PROCEDURE — 82948 REAGENT STRIP/BLOOD GLUCOSE: CPT

## 2023-10-15 PROCEDURE — 36415 COLL VENOUS BLD VENIPUNCTURE: CPT

## 2023-10-15 PROCEDURE — 2500000003 HC RX 250 WO HCPCS: Performed by: NURSE PRACTITIONER

## 2023-10-15 PROCEDURE — 2000000000 HC ICU R&B

## 2023-10-15 PROCEDURE — 2580000003 HC RX 258: Performed by: PHARMACIST

## 2023-10-15 PROCEDURE — 94003 VENT MGMT INPAT SUBQ DAY: CPT

## 2023-10-15 PROCEDURE — 85027 COMPLETE CBC AUTOMATED: CPT

## 2023-10-15 PROCEDURE — 85520 HEPARIN ASSAY: CPT

## 2023-10-15 PROCEDURE — 6370000000 HC RX 637 (ALT 250 FOR IP): Performed by: NURSE PRACTITIONER

## 2023-10-15 PROCEDURE — 80048 BASIC METABOLIC PNL TOTAL CA: CPT

## 2023-10-15 PROCEDURE — 71045 X-RAY EXAM CHEST 1 VIEW: CPT

## 2023-10-15 PROCEDURE — 84478 ASSAY OF TRIGLYCERIDES: CPT

## 2023-10-15 PROCEDURE — 83735 ASSAY OF MAGNESIUM: CPT

## 2023-10-15 PROCEDURE — 94761 N-INVAS EAR/PLS OXIMETRY MLT: CPT

## 2023-10-15 PROCEDURE — 6360000002 HC RX W HCPCS: Performed by: PHARMACIST

## 2023-10-15 RX ADMIN — PIPERACILLIN AND TAZOBACTAM 3375 MG: 3; .375 INJECTION, POWDER, LYOPHILIZED, FOR SOLUTION INTRAVENOUS at 08:26

## 2023-10-15 RX ADMIN — METRONIDAZOLE 500 MG: 500 INJECTION, SOLUTION INTRAVENOUS at 03:48

## 2023-10-15 RX ADMIN — VANCOMYCIN HYDROCHLORIDE 1000 MG: 1 INJECTION, POWDER, LYOPHILIZED, FOR SOLUTION INTRAVENOUS at 21:03

## 2023-10-15 RX ADMIN — CHLORHEXIDINE GLUCONATE 0.12% ORAL RINSE 15 ML: 1.2 LIQUID ORAL at 08:29

## 2023-10-15 RX ADMIN — PROPOFOL 30 MCG/KG/MIN: 10 INJECTION, EMULSION INTRAVENOUS at 23:15

## 2023-10-15 RX ADMIN — PIPERACILLIN AND TAZOBACTAM 3375 MG: 3; .375 INJECTION, POWDER, LYOPHILIZED, FOR SOLUTION INTRAVENOUS at 22:38

## 2023-10-15 RX ADMIN — PIPERACILLIN AND TAZOBACTAM 3375 MG: 3; .375 INJECTION, POWDER, LYOPHILIZED, FOR SOLUTION INTRAVENOUS at 15:28

## 2023-10-15 RX ADMIN — SODIUM CHLORIDE, PRESERVATIVE FREE 20 MG: 5 INJECTION INTRAVENOUS at 20:42

## 2023-10-15 RX ADMIN — PROPOFOL 25 MCG/KG/MIN: 10 INJECTION, EMULSION INTRAVENOUS at 05:11

## 2023-10-15 RX ADMIN — METRONIDAZOLE 500 MG: 500 INJECTION, SOLUTION INTRAVENOUS at 20:42

## 2023-10-15 RX ADMIN — METRONIDAZOLE 500 MG: 500 INJECTION, SOLUTION INTRAVENOUS at 13:10

## 2023-10-15 RX ADMIN — SODIUM CHLORIDE, PRESERVATIVE FREE 10 ML: 5 INJECTION INTRAVENOUS at 08:28

## 2023-10-15 RX ADMIN — VANCOMYCIN HYDROCHLORIDE 1000 MG: 1 INJECTION, POWDER, LYOPHILIZED, FOR SOLUTION INTRAVENOUS at 11:09

## 2023-10-15 RX ADMIN — SODIUM CHLORIDE, PRESERVATIVE FREE 20 MG: 5 INJECTION INTRAVENOUS at 08:26

## 2023-10-15 RX ADMIN — CHLORHEXIDINE GLUCONATE 0.12% ORAL RINSE 15 ML: 1.2 LIQUID ORAL at 20:43

## 2023-10-15 RX ADMIN — HEPARIN SODIUM 15 UNITS/KG/HR: 10000 INJECTION, SOLUTION INTRAVENOUS at 12:11

## 2023-10-15 RX ADMIN — PROPOFOL 25 MCG/KG/MIN: 10 INJECTION, EMULSION INTRAVENOUS at 15:40

## 2023-10-15 ASSESSMENT — PULMONARY FUNCTION TESTS
PIF_VALUE: 20
PIF_VALUE: 20
PIF_VALUE: 21
PIF_VALUE: 20
PIF_VALUE: 18
PIF_VALUE: 22
PIF_VALUE: 19
PIF_VALUE: 20
PIF_VALUE: 20

## 2023-10-15 ASSESSMENT — PAIN SCALES - GENERAL: PAINLEVEL_OUTOF10: 0

## 2023-10-15 NOTE — PROGRESS NOTES
CRITICAL CARE PROGRESS NOTE      Patient:  Edmund Cottrell    Unit/Bed:4D-08/008-A  YOB: 1950  MRN: [id number]   PCP: Luciana Romero MD  Date of Admission: 10/12/2023  Chief Complaint:- Tracheal stenosis s/p resection    Assessment and Plan:    S/p cricotracheal resection, laryngoplasty with flap and vocal fold lateralization stitch placement:  POD #3 with Dr Emanuel Sears, ENT. H/o complex COVID-19-->trach-->tracheal stenosis. Pt is trach dependent historically, has posterior glottic stenosis, and cricoarytenoid join fixation. Current trach in place with a 5.0 ETT and b/l bulb suction drains. ICU monitoring  Propofol @ 25. Goal RASS: -2 to -3, in order to minimize movement and involuntary coughing  2 pillows to help maintain forward flexion of the neck  112 cc total out from drains   Strict drain output documentation. Notify clinician immediately if bulbs lose suction  NO steroids  Right main stem intubation, resolved  B/l DVT's: Superficial, noted from BLE duplex on 10/12. Nonocclusive thrombus in b/l PT veins, and right peroneal vein. ENT wanted to withhold [facility] following surgery. The decision was made to start heparin gtt for treatment of these DVT's on 10/14, in conjunction with ENT. Heparin was chosen so that if she did develop a bleed we could quickly turn it off. Continue heparin  Leukocystosis, resolved:  10.0 (11.6). Suspected 2/2 steroid use. Afebrile. No obvious infection  Continue Zosyn, Vanc, Flagyl per ENT  DM type 2:  A1c 5.5 in January 2023. Not currently on insulin at home. Medium dose sliding scale  Hypoglycemia protocol in place  Chronic diastolic CHF:  ECHO 29/01/80, EF 60-65% with LV WMA's and mildly dilated LA. [address] cardiology. 10/14 Pt coughed up bloody sputum outside of her ETT. CXR significant for pulmonary vascular congestion. POCUS revealed B lines in b/l lungs as well as decreased collapsibility of IVC, suspected volume overload. Pt given 1 mg Bumex.

## 2023-10-15 NOTE — PROGRESS NOTES
Patient is still on the vent on mild sedation. Her ventilation remains excellent and there has been no bleeding. Chest x-ray cleared significantly since yesterday with the diuresis. No issues at this time. Nila Walker

## 2023-10-15 NOTE — PLAN OF CARE
Problem: Safety - Medical Restraint  Goal: Remains free of injury from restraints (Restraint for Interference with Medical Device)  Description: INTERVENTIONS:  1. Determine that other, less restrictive measures have been tried or would not be effective before applying the restraint  2. Evaluate the patient's condition at the time of restraint application  3. Inform patient/family regarding the reason for restraint  4. Q2H: Monitor safety, psychosocial status, comfort, nutrition and hydration  10/15/2023 1541 by Char Travis RN  Flowsheets (Taken 10/13/2023 0515 by Rossy Damian RN)  Remains free of injury from restraints (restraint for interference with medical device):    Inform patient/family regarding the reason for restraint   Every 2 hours: Monitor safety, psychosocial status, comfort, nutrition and hydration   Evaluate the patient's condition at the time of restraint application   Determine that other, less restrictive measures have been tried or would not be effective before applying the restraint  10/15/2023 0428 by Canelo Wagner RN  Outcome: Progressing

## 2023-10-15 NOTE — PLAN OF CARE
Problem: Respiratory - Adult  Goal: Achieves optimal ventilation and oxygenation  Outcome: Progressing  Flowsheets (Taken 10/14/2023 9135)  Achieves optimal ventilation and oxygenation:   Assess for changes in respiratory status   Position to facilitate oxygenation and minimize respiratory effort   Assess the need for suctioning and aspirate as needed   Respiratory therapy support as indicated   Assess and instruct to report shortness of breath or any respiratory difficulty   Encourage broncho-pulmonary hygiene including cough, deep breathe, incentive spirometry   Oxygen supplementation based on oxygen saturation or arterial blood gases   Assess for changes in mentation and behavior  Note: Intubated/Vented. Wean as tolerated. SBT when appropriate.

## 2023-10-15 NOTE — PLAN OF CARE
Problem: Respiratory - Adult  Goal: Normal spontaneous ventilation  10/15/2023 1233 by Josiah Zafar RCP  Outcome: Progressing                                                Patient Weaning Progress    The patient's vent settings was able to be weaned this shift. Ventilator settings that were weaned              [x] Mode   [] Pressure support weaned   [] Fio2 weaned   [] Peep weaned      Spontaneous weaning trial  was attempted. The patient was able to tolerate SBT. RSBI  was 68 with EtCO2 of 37 and SpO2 of 97 on 30% FiO2. Spontanteous VT was 369 and RR 20 breaths/min. The patient was on SBT for 120 minutes     Unable to get agreement for goals because no family is present and patient cannot respond.

## 2023-10-15 NOTE — PLAN OF CARE
Problem: Respiratory - Adult  Goal: Normal spontaneous ventilation  Outcome: Progressing     Problem: Respiratory - Adult  Goal: Achieves optimal ventilation and oxygenation  10/15/2023 0504 by Ricco Duron RCP  Outcome: Progressing                                                  Patient Weaning Progress    The patient's vent settings was able to be weaned this shift. Ventilator settings that were weaned              [x] Mode   [x] Pressure support weaned   [] Fio2 weaned   [] Peep weaned      Spontaneous weaning trial  was attempted. The patient was able to tolerate SBT. RSBI  was 59 with EtCO2 of 36 and SpO2 of 100 on 21% FiO2. Spontanteous VT was 342 and RR 17 breaths/min. The patient was on SBT for 5 minutes     Evac tube was not  hooked up with continuous low suction(20-30mmHg)      Cuff was not  deflated to determine cuff leak. A leak  was not detected. Unable to get agreement for goals because no family is present and patient cannot respond.

## 2023-10-15 NOTE — PROGRESS NOTES
[facility]--. [address] [facility] PROGRESS NOTE      Patient: Royal Baez  Room #: 4D-08/008-A            YOB: 1950  Age: 81 y.o. Gender: female            Admit Date & Time: 10/12/2023  6:19 AM    Assessment:    The patient was asleep and intubated at the time of this attempted visit. Interventions: The patient was provided silent prayer. Outcomes: The patient remains sleeping at the end of the visit. Plan: 1. Spiritual care will continue to follow the patient according to [facility]. [facility] spiritual care SOP.        Electronically signed by Vibha Qiu on 10/15/2023 at 10:35 AM.  [place]  [phone]     10/15/23 1032   Encounter Summary   Encounter Overview/Reason  Follow-up   Service Provided For: Patient   Referral/Consult From: [facility] [place]   Support System Unknown   Last Encounter  10/15/23  (NR)   Complexity of Encounter Low   Begin Time 0955   End Time  1000   Total Time Calculated 5 min   Spiritual/Emotional needs   Type Spiritual Support   Assessment/Intervention/Outcome   Assessment Unable to assess   Intervention Prayer (assurance of)/[place]   Outcome Did not respond

## 2023-10-16 ENCOUNTER — APPOINTMENT (OUTPATIENT)
Dept: GENERAL RADIOLOGY | Age: 73
DRG: 163 | End: 2023-10-16
Attending: OTOLARYNGOLOGY
Payer: MEDICARE

## 2023-10-16 LAB
ANION GAP SERPL CALC-SCNC: 9 MEQ/L (ref 8–16)
BASE EXCESS BLDA CALC-SCNC: -1.9 MMOL/L (ref -2.5–2.5)
BDY SITE: ABNORMAL
BREATHS SETTING VENT: 12 BPM
BUN SERPL-MCNC: 11 MG/DL (ref 7–22)
CA-I BLD ISE-SCNC: 1.19 MMOL/L (ref 1.12–1.32)
CALCIUM SERPL-MCNC: 8.3 MG/DL (ref 8.5–10.5)
CHLORIDE SERPL-SCNC: 110 MEQ/L (ref 98–111)
CO2 SERPL-SCNC: 24 MEQ/L (ref 23–33)
COLLECTED BY:: ABNORMAL
CREAT SERPL-MCNC: 0.6 MG/DL (ref 0.4–1.2)
DEPRECATED RDW RBC AUTO: 47.3 FL (ref 35–45)
DEVICE: ABNORMAL
ERYTHROCYTE [DISTWIDTH] IN BLOOD BY AUTOMATED COUNT: 14.3 % (ref 11.5–14.5)
FIO2 ON VENT O2 ANALYZER: 100 %
GFR SERPL CREATININE-BSD FRML MDRD: > 60 ML/MIN/1.73M2
GLUCOSE BLD STRIP.AUTO-MCNC: 112 MG/DL (ref 70–108)
GLUCOSE BLD STRIP.AUTO-MCNC: 168 MG/DL (ref 70–108)
GLUCOSE BLD STRIP.AUTO-MCNC: 181 MG/DL (ref 70–108)
GLUCOSE BLD STRIP.AUTO-MCNC: 203 MG/DL (ref 70–108)
GLUCOSE BLD STRIP.AUTO-MCNC: 240 MG/DL (ref 70–108)
GLUCOSE BLD STRIP.AUTO-MCNC: 242 MG/DL (ref 70–108)
GLUCOSE SERPL-MCNC: 157 MG/DL (ref 70–108)
HCO3 BLDA-SCNC: 23 MMOL/L (ref 23–28)
HCT VFR BLD AUTO: 28.1 % (ref 37–47)
HEPARIN UNFRACTIONATED: 0.51 U/ML (ref 0.3–0.7)
HEPARIN UNFRACTIONATED: 0.59 U/ML (ref 0.3–0.7)
HGB BLD-MCNC: 8.9 GM/DL (ref 12–16)
MAGNESIUM SERPL-MCNC: 1.7 MG/DL (ref 1.6–2.4)
MCH RBC QN AUTO: 28.8 PG (ref 26–33)
MCHC RBC AUTO-ENTMCNC: 31.7 GM/DL (ref 32.2–35.5)
MCV RBC AUTO: 90.9 FL (ref 81–99)
PCO2 BLDA: 40 MMHG (ref 35–45)
PEEP SETTING VENT: 6 MMHG
PH BLDA: 7.37 [PH] (ref 7.35–7.45)
PIP: 30 CMH2O
PLATELET # BLD AUTO: 194 THOU/MM3 (ref 130–400)
PMV BLD AUTO: 10.2 FL (ref 9.4–12.4)
PO2 BLDA: 372 MMHG (ref 71–104)
POTASSIUM SERPL-SCNC: 3.5 MEQ/L (ref 3.5–5.2)
RBC # BLD AUTO: 3.09 MILL/MM3 (ref 4.2–5.4)
SAO2 % BLDA: 100 %
SODIUM SERPL-SCNC: 143 MEQ/L (ref 135–145)
WBC # BLD AUTO: 8.1 THOU/MM3 (ref 4.8–10.8)

## 2023-10-16 PROCEDURE — 85027 COMPLETE CBC AUTOMATED: CPT

## 2023-10-16 PROCEDURE — 0BH17EZ INSERTION OF ENDOTRACHEAL AIRWAY INTO TRACHEA, VIA NATURAL OR ARTIFICIAL OPENING: ICD-10-PCS | Performed by: INTERNAL MEDICINE

## 2023-10-16 PROCEDURE — 6360000002 HC RX W HCPCS

## 2023-10-16 PROCEDURE — 93010 ELECTROCARDIOGRAM REPORT: CPT | Performed by: INTERNAL MEDICINE

## 2023-10-16 PROCEDURE — 94660 CPAP INITIATION&MGMT: CPT

## 2023-10-16 PROCEDURE — 2500000003 HC RX 250 WO HCPCS: Performed by: INTERNAL MEDICINE

## 2023-10-16 PROCEDURE — 85520 HEPARIN ASSAY: CPT

## 2023-10-16 PROCEDURE — 6360000002 HC RX W HCPCS: Performed by: NURSE PRACTITIONER

## 2023-10-16 PROCEDURE — 37799 UNLISTED PX VASCULAR SURGERY: CPT

## 2023-10-16 PROCEDURE — 82330 ASSAY OF CALCIUM: CPT

## 2023-10-16 PROCEDURE — 31645 BRNCHSC W/THER ASPIR 1ST: CPT | Performed by: INTERNAL MEDICINE

## 2023-10-16 PROCEDURE — 6360000002 HC RX W HCPCS: Performed by: INTERNAL MEDICINE

## 2023-10-16 PROCEDURE — A4216 STERILE WATER/SALINE, 10 ML: HCPCS | Performed by: NURSE PRACTITIONER

## 2023-10-16 PROCEDURE — 36415 COLL VENOUS BLD VENIPUNCTURE: CPT

## 2023-10-16 PROCEDURE — 31500 INSERT EMERGENCY AIRWAY: CPT | Performed by: INTERNAL MEDICINE

## 2023-10-16 PROCEDURE — 2500000003 HC RX 250 WO HCPCS

## 2023-10-16 PROCEDURE — 6370000000 HC RX 637 (ALT 250 FOR IP): Performed by: STUDENT IN AN ORGANIZED HEALTH CARE EDUCATION/TRAINING PROGRAM

## 2023-10-16 PROCEDURE — 6370000000 HC RX 637 (ALT 250 FOR IP): Performed by: NURSE PRACTITIONER

## 2023-10-16 PROCEDURE — 94761 N-INVAS EAR/PLS OXIMETRY MLT: CPT

## 2023-10-16 PROCEDURE — 31500 INSERT EMERGENCY AIRWAY: CPT

## 2023-10-16 PROCEDURE — 80048 BASIC METABOLIC PNL TOTAL CA: CPT

## 2023-10-16 PROCEDURE — 83735 ASSAY OF MAGNESIUM: CPT

## 2023-10-16 PROCEDURE — 2580000003 HC RX 258: Performed by: PHARMACIST

## 2023-10-16 PROCEDURE — 2580000003 HC RX 258: Performed by: NURSE PRACTITIONER

## 2023-10-16 PROCEDURE — 93005 ELECTROCARDIOGRAM TRACING: CPT | Performed by: INTERNAL MEDICINE

## 2023-10-16 PROCEDURE — 2700000000 HC OXYGEN THERAPY PER DAY

## 2023-10-16 PROCEDURE — 71045 X-RAY EXAM CHEST 1 VIEW: CPT

## 2023-10-16 PROCEDURE — 92950 HEART/LUNG RESUSCITATION CPR: CPT

## 2023-10-16 PROCEDURE — 94003 VENT MGMT INPAT SUBQ DAY: CPT

## 2023-10-16 PROCEDURE — 82803 BLOOD GASES ANY COMBINATION: CPT

## 2023-10-16 PROCEDURE — 2500000003 HC RX 250 WO HCPCS: Performed by: NURSE PRACTITIONER

## 2023-10-16 PROCEDURE — 5A1955Z RESPIRATORY VENTILATION, GREATER THAN 96 CONSECUTIVE HOURS: ICD-10-PCS | Performed by: INTERNAL MEDICINE

## 2023-10-16 PROCEDURE — 6360000002 HC RX W HCPCS: Performed by: EMERGENCY MEDICINE

## 2023-10-16 PROCEDURE — 99291 CRITICAL CARE FIRST HOUR: CPT | Performed by: INTERNAL MEDICINE

## 2023-10-16 PROCEDURE — 2000000000 HC ICU R&B

## 2023-10-16 PROCEDURE — 82948 REAGENT STRIP/BLOOD GLUCOSE: CPT

## 2023-10-16 PROCEDURE — 6360000002 HC RX W HCPCS: Performed by: PHARMACIST

## 2023-10-16 PROCEDURE — 99292 CRITICAL CARE ADDL 30 MIN: CPT | Performed by: INTERNAL MEDICINE

## 2023-10-16 PROCEDURE — 2580000003 HC RX 258: Performed by: EMERGENCY MEDICINE

## 2023-10-16 PROCEDURE — 5A12012 PERFORMANCE OF CARDIAC OUTPUT, SINGLE, MANUAL: ICD-10-PCS | Performed by: INTERNAL MEDICINE

## 2023-10-16 RX ORDER — NOREPINEPHRINE BITARTRATE 0.06 MG/ML
INJECTION, SOLUTION INTRAVENOUS
Status: COMPLETED
Start: 2023-10-16 | End: 2023-10-16

## 2023-10-16 RX ORDER — LORAZEPAM 2 MG/ML
INJECTION INTRAMUSCULAR
Status: COMPLETED
Start: 2023-10-16 | End: 2023-10-16

## 2023-10-16 RX ORDER — NOREPINEPHRINE BITARTRATE 0.06 MG/ML
1-100 INJECTION, SOLUTION INTRAVENOUS CONTINUOUS
Status: DISCONTINUED | OUTPATIENT
Start: 2023-10-16 | End: 2023-10-19

## 2023-10-16 RX ORDER — LORAZEPAM 2 MG/ML
2 INJECTION INTRAMUSCULAR ONCE
Status: COMPLETED | OUTPATIENT
Start: 2023-10-16 | End: 2023-10-16

## 2023-10-16 RX ORDER — LORAZEPAM 2 MG/ML
4 INJECTION INTRAMUSCULAR EVERY 5 MIN PRN
Status: DISCONTINUED | OUTPATIENT
Start: 2023-10-16 | End: 2023-11-03

## 2023-10-16 RX ORDER — EPINEPHRINE IN SOD CHLOR,ISO 1 MG/10 ML
SYRINGE (ML) INTRAVENOUS
Status: COMPLETED | OUTPATIENT
Start: 2023-10-16 | End: 2023-10-16

## 2023-10-16 RX ORDER — ATROPINE SULFATE 0.1 MG/ML
INJECTION INTRAVENOUS
Status: COMPLETED | OUTPATIENT
Start: 2023-10-16 | End: 2023-10-16

## 2023-10-16 RX ORDER — MORPHINE SULFATE 4 MG/ML
INJECTION, SOLUTION INTRAMUSCULAR; INTRAVENOUS
Status: DISPENSED
Start: 2023-10-16 | End: 2023-10-17

## 2023-10-16 RX ADMIN — PROPOFOL 30 MCG/KG/MIN: 10 INJECTION, EMULSION INTRAVENOUS at 06:05

## 2023-10-16 RX ADMIN — PROPOFOL 40 MCG/KG/MIN: 10 INJECTION, EMULSION INTRAVENOUS at 20:20

## 2023-10-16 RX ADMIN — CHLORHEXIDINE GLUCONATE 0.12% ORAL RINSE 15 ML: 1.2 LIQUID ORAL at 08:14

## 2023-10-16 RX ADMIN — PIPERACILLIN AND TAZOBACTAM 3375 MG: 3; .375 INJECTION, POWDER, LYOPHILIZED, FOR SOLUTION INTRAVENOUS at 05:56

## 2023-10-16 RX ADMIN — LORAZEPAM 2 MG: 2 INJECTION INTRAMUSCULAR; INTRAVENOUS at 15:35

## 2023-10-16 RX ADMIN — Medication 5 MCG/MIN: at 13:29

## 2023-10-16 RX ADMIN — VANCOMYCIN HYDROCHLORIDE 1000 MG: 1 INJECTION, POWDER, LYOPHILIZED, FOR SOLUTION INTRAVENOUS at 22:32

## 2023-10-16 RX ADMIN — INSULIN LISPRO 2 UNITS: 100 INJECTION, SOLUTION INTRAVENOUS; SUBCUTANEOUS at 22:33

## 2023-10-16 RX ADMIN — CHLORHEXIDINE GLUCONATE 0.12% ORAL RINSE 15 ML: 1.2 LIQUID ORAL at 20:13

## 2023-10-16 RX ADMIN — LEVETIRACETAM 1500 MG: 100 INJECTION, SOLUTION INTRAVENOUS at 17:06

## 2023-10-16 RX ADMIN — Medication 1 MG: at 12:49

## 2023-10-16 RX ADMIN — PIPERACILLIN AND TAZOBACTAM 3375 MG: 3; .375 INJECTION, POWDER, LYOPHILIZED, FOR SOLUTION INTRAVENOUS at 18:05

## 2023-10-16 RX ADMIN — SODIUM CHLORIDE: 9 INJECTION, SOLUTION INTRAVENOUS at 13:49

## 2023-10-16 RX ADMIN — SODIUM BICARBONATE 50 MEQ: 84 INJECTION, SOLUTION INTRAVENOUS at 12:50

## 2023-10-16 RX ADMIN — NOREPINEPHRINE BITARTRATE 5 MCG/MIN: 0.06 INJECTION, SOLUTION INTRAVENOUS at 13:29

## 2023-10-16 RX ADMIN — SODIUM CHLORIDE, PRESERVATIVE FREE 10 ML: 5 INJECTION INTRAVENOUS at 08:14

## 2023-10-16 RX ADMIN — METRONIDAZOLE 500 MG: 500 INJECTION, SOLUTION INTRAVENOUS at 03:31

## 2023-10-16 RX ADMIN — LORAZEPAM 2 MG: 2 INJECTION INTRAMUSCULAR at 15:35

## 2023-10-16 RX ADMIN — ATROPINE SULFATE 0.5 MG: 0.1 INJECTION, SOLUTION ENDOTRACHEAL; INTRAMUSCULAR; INTRAVENOUS; SUBCUTANEOUS at 12:52

## 2023-10-16 RX ADMIN — SODIUM CHLORIDE, PRESERVATIVE FREE 20 MG: 5 INJECTION INTRAVENOUS at 20:29

## 2023-10-16 RX ADMIN — VANCOMYCIN HYDROCHLORIDE 1000 MG: 1 INJECTION, POWDER, LYOPHILIZED, FOR SOLUTION INTRAVENOUS at 10:25

## 2023-10-16 RX ADMIN — SODIUM CHLORIDE, PRESERVATIVE FREE 20 MG: 5 INJECTION INTRAVENOUS at 08:14

## 2023-10-16 RX ADMIN — INSULIN LISPRO 2 UNITS: 100 INJECTION, SOLUTION INTRAVENOUS; SUBCUTANEOUS at 17:18

## 2023-10-16 ASSESSMENT — PAIN SCALES - GENERAL
PAINLEVEL_OUTOF10: 0
PAINLEVEL_OUTOF10: 0

## 2023-10-16 ASSESSMENT — PULMONARY FUNCTION TESTS
PIF_VALUE: 27
PIF_VALUE: 20
PIF_VALUE: 30
PIF_VALUE: 19
PIF_VALUE: 35
PIF_VALUE: 19

## 2023-10-16 NOTE — PROCEDURES
ICU PROCEDURE - ENDOTRACHEAL INTUBATION    Efren Tripathi     MRN#: [id number]  10/16/23      [email or web address]     : 1950      INDICATION: respiratory arrest    Procedure performed while patient receiving CPR    ANESTHESIA:   []Ketamine  []Ativan  [] Morphine  []Propofol  [x]Other medications: NONE      ESTIMATED BLOOD LOSS:  None. COMPLICATIONS:  []N/A  [x] Other: Difficult intubation. 2 failed attempts with 3rd being successful with posterior rotation of stilet. LARYNGOSCOPIC AIRWAY GRADE (CORMACK-LEHANE):[]1  []2a  []2b [x]3  []4        INTUBATION EQUIPMENT USED:  [x] Direct laryngoscope only    OUTCOME: Successful placement of #  5.0 endotracheal via   [x]Oral route    INSERTION DEPTH:  24    cm from   [x]lip           CONFIRMATION OF TUBE POSITION:   [x]Capnography - Strong & repeatable exhaled CO2 detection   [x]Multiple point auscultation   [x]SpO2 response   []STAT X-ray   [x]Bronchoscopic assessment    UNUSUAL FINDINGS: Lots of secretions. Airway narrowed. PROCEDURE:     Using direct laryngoscopy, the vocal cords were visualized and the endotracheal tube was placed through the cords under direct vision. Good breath sounds were auscultated bilaterally without sounds over abdomen. Appropriate strong & repeatable exhaled CO2 detection was confirmed.        Electronically signed by William Lynn MD on 10/16/2023 at 4:24 PM

## 2023-10-16 NOTE — PROGRESS NOTES
CRITICAL CARE PROGRESS NOTE      Patient:  Chaitanya Gould    Unit/Bed:4D-08/008-A  YOB: 1950  MRN: [id number]   PCP: Andrea Aden MD  Date of Admission: 10/12/2023  Chief Complaint:- Tracheal stenosis s/p resection    Assessment and Plan:    S/p cricotracheal resection, laryngoplasty with flap and vocal fold lateralization stitch placement:  POD #4 with Dr Katherine Solitario, ENT. H/o complex COVID-19-->trach-->tracheal stenosis. Pt is trach dependent historically, has posterior glottic stenosis, and cricoarytenoid join fixation. Current trach in place with a 5.0 ETT and b/l bulb suction drains. ICU monitoring  Propofol @ 25. Goal RASS: -2 to -3, in order to minimize movement and involuntary coughing  2 pillows to help maintain forward flexion of the neck  267 cc total out from drains   Strict drain output documentation. Notify clinician immediately if bulbs lose suction  NO steroids  Right main stem intubation, resolved  Pt to be extubated when ENT determines she is ready. B/l DVT's: Superficial, noted from BLE duplex on 10/12. Nonocclusive thrombus in b/l PT veins, and right peroneal vein. ENT wanted to withhold [facility] following surgery. The decision was made to start heparin gtt for treatment of these DVT's on 10/14, in conjunction with ENT. Heparin was chosen so that if she did develop a bleed we could quickly turn it off. Continue heparin  Leukocystosis, resolved:  8.1 (10). Suspected 2/2 steroid use. Afebrile. No obvious infection  Continue Zosyn, Vanc, Flagyl per ENT  DM type 2:  A1c 5.5 in January 2023. Not currently on insulin at home. Medium dose sliding scale  Hypoglycemia protocol in place  Chronic diastolic CHF:  ECHO 18/17/37, EF 60-65% with LV WMA's and mildly dilated LA. [address] [facility] cardiology. 10/14 Pt coughed up bloody sputum outside of her ETT. CXR significant for pulmonary vascular congestion.  POCUS revealed B lines in b/l lungs as well as decreased collapsibility of IVC, suspected volume overload. Pt given 1 mg Bumex. Consider further diuresis  CAD:  S/p PCI. Stress test 06/22 negative for ischemia. 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 Cathye Severe. Pt is POD #0 and transferred to ICU for close airway monitoring. On arrival to ICU, pt intubated and sedated, 2-pillow prop to maintain forward flexion of neck at all times, b/l suction-bulb drains in neck, midline transverse surgical incision site noted on anterior neck. \"    10/13: No acute events overnight. 10/14: Yesterday morning, CXR revealed a R mainstem intubation, it was retracted 2.5 cm and repeat CXR confirms placement above the telma. 10/15: Yesterday Pt had bloody sputum emerge from outside the ETT tube. Based on CXR, POCUS, and I & O's it was suspected she had volume overload, given 1 mg Bumex and IVF held. Heparin gtt started for DVT treatment. 10/16: No acute events over night. Pt is still intubated and sedated. Past Medical History:        Diagnosis Date    Arthritis     Coronary artery disease     COVID     Diabetic neuropathy ([address]) 2021    Bilateral feet numbness    DVT (deep venous thrombosis) ([address]) 01/21/2023    Right lower extremity    Glottic stenosis     3/2022    Hyperlipidemia     Lower back pain 2022    With diagnosis of having \"stress fracture\" by ortho at [facility]    Primary hypertension     Pulmonary embolism ([address]) 01/21/2023    Right lower lobe    Type 2 diabetes mellitus ([address]) 2018   .   Family History:     Parkinson's

## 2023-10-16 NOTE — PROGRESS NOTES
No call back received from after hours EEG number for stat EEG (order placed at 1545, message states service ends at 1500). Writer asked Dr. Bruna Guzman for a neurology consult. No new orders received.

## 2023-10-16 NOTE — PLAN OF CARE
Problem: Discharge Planning  Goal: Discharge to home or other facility with appropriate resources  Outcome: Progressing     Problem: Chronic Conditions and Co-morbidities  Goal: Patient's chronic conditions and co-morbidity symptoms are monitored and maintained or improved  Outcome: Progressing     Problem: Safety - Adult  Goal: Free from fall injury  Outcome: Progressing     Problem: Pain  Goal: Verbalizes/displays adequate comfort level or baseline comfort level  Outcome: Progressing     Problem: Safety - Medical Restraint  Goal: Remains free of injury from restraints (Restraint for Interference with Medical Device)  Description: INTERVENTIONS:  1. Determine that other, less restrictive measures have been tried or would not be effective before applying the restraint  2. Evaluate the patient's condition at the time of restraint application  3. Inform patient/family regarding the reason for restraint  4.  Q2H: Monitor safety, psychosocial status, comfort, nutrition and hydration  10/16/2023 0142 by Jacob Stephens RN  Outcome: Progressing  Flowsheets  Taken 10/16/2023 0006  Remains free of injury from restraints (restraint for interference with medical device):   Determine that other, less restrictive measures have been tried or would not be effective before applying the restraint   Evaluate the patient's condition at the time of restraint application   Inform patient/family regarding the reason for restraint   Every 2 hours: Monitor safety, psychosocial status, comfort, nutrition and hydration  Taken 10/15/2023 2000  Remains free of injury from restraints (restraint for interference with medical device):   Determine that other, less restrictive measures have been tried or would not be effective before applying the restraint   Evaluate the patient's condition at the time of restraint application   Inform patient/family regarding the reason for restraint   Every 2 hours: Monitor safety, psychosocial status,

## 2023-10-16 NOTE — PROGRESS NOTES
Patient Weaning Progress    The patient's vent settings was able to be weaned this shift. Ventilator settings that were weaned              [x] Mode   [x] Pressure support weaned   [x] Fio2 weaned   [] Peep weaned      Spontaneous weaning trial  was attempted. due to defined parameters for SBT (spontaneous breathing trial) not being met. Reason that defined ventilator parameters for SBT was not met              [] Patient condition requires increased ventilator settings  [] Requires increased sedation   [] Settings not within weaning range   [] SAT not completed   [] Physician orders    The patient was able to tolerate SBT. RSBI  was 37 with EtCO2 of 36 and SpO2 of 99 on 21% FiO2. Spontanteous VT was 437 and RR 17 breaths/min. The patient was on SBT for  minutes     Evac tube was not  hooked up with continuous low suction(20-30mmHg)      Cuff was  deflated to determine cuff leak. A leak  was not detected. ENT ok with no leak  Patient mutually agreed on goals.

## 2023-10-16 NOTE — PROGRESS NOTES
Comprehensive Nutrition Assessment    Type and Reason for Visit:  Reassess (bolus tube feed monitor)    Nutrition Recommendations/Plan:   Tube Feed regimen: Bolus 150ml Vital 1.2 every 4 hours with 30ml water flush before and after each bolus  If remains off diprivan, recommend increase Vital 1.2 bolus volume to 215ml every 4 hours to provide 1548 kcals, 97gms protein, 143gms cho, 7gms fiber, 1046ml free H20 in 1406ml total volume/24hr    Recommend swallow evaluation when appropriate  Monitoring for home tube feed needs, water flushes and education as appropriate      Malnutrition Assessment:  Malnutrition Status:  Insufficient data (10/13/23 1128)    Context:  Chronic Illness     Findings of the 6 clinical characteristics of malnutrition:  Energy Intake:  Unable to assess (pt. intubated)  Weight Loss:  Greater than 10% over 6 months (31# or 16% in 5 months)     Body Fat Loss:  Unable to assess (facial edema; RN asked not to disturb pt. this am)     Muscle Mass Loss:  Unable to assess    Fluid Accumulation:  Mild     Strength:  Not Performed    Nutrition Assessment:      Pt. nutritionally compromised AEB NPO, intubated s/p ENT surgery 10/12, need for nutrition support. 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       Nutrition Related Findings:    Pt. Report/Treatments/Miscellaneous: patient seen this morning and RN reported diprivan off with plan to extubate, tolerating bolus tube feed every 4 hours, note report of code blue this afternoon   GI Status: no BM yet, RN reports hypoactive bowel sounds   Pertinent Labs: Sodium 143, Potassium 3.5, BUN 11, Creatinine 0.6, glucose 181  Pertinent Meds: pepcid, humalog, zosyn, vancomycin, levophed, prn glycolax     Wound Type: Pressure Injury, Stage II (coccyx; stage 1 buttocks; skin tear abdomen; 10/12 ENT surgery:

## 2023-10-16 NOTE — SIGNIFICANT EVENT
Dr. Urban Shell desired trial of extubation directly to Bipap today. After discussion on rounds, plan was to extubate with resuscitation equipment at bedside and resident at bedside to assess for respiratory compromise. Patient extubated to BiPAP. Resident was not at bedside for extubation. Resident was notified of respiratory distress by RT. Resident immediately called me for intubation and I came to bedside. Upon my arrival, patient is hypoxic and receiving CPR. Airway was difficult to secure secondary to secretions and narrow airway. Two failed attempts as I could not advance 5.0 ETT into the airway. On attempt number three I still could not advance ETT into airway. I rotated ETT stilet posteriorly and was able to secure the airway. Emergent bronchoscopy showed bloody secretions which were cleared with irrigation and suction. ETT above telma. Dr. Urban Shell at bedside at bronchoscopy. Monitoring for wakefulness. Stat EEG for concern for myoclonus vs seizures. Total CC time 90 minutes which does not include procedures.

## 2023-10-16 NOTE — PROCEDURES
BRONCHOSCOPY    Indication:respiratory arrest  Medical necessity  Sedation:none      EBL:  none. Findings:  ETT above the telma. Thick bloody secretions in RLL, removed with saline irrigation and suction. Therapeutic bronchoscopy took piecemeal extraction with 3 separate intubations to clear secretions. Pediatric scope utilized due to small ETT size. Samples:  none    Complications:  None    Procedure:  Medical necessity. Utilizing the endotracheal tube, the bronchoscope was advanced to the level of the trachea and subsequently the telma. The telma was noted to be crisp. Scope was taken to the left and right lung fields. Samples taken as noted above. Bronchoscope was withdrawn. Electronically signed by Maurisio Perez M.D.

## 2023-10-16 NOTE — PLAN OF CARE
Problem: Respiratory - Adult  Goal: Normal spontaneous ventilation  Outcome: Progressing     Problem: Respiratory - Adult  Goal: Achieves optimal ventilation and oxygenation  10/16/2023 0523 by Ricco Duron RCP  Outcome: Progressing                                                  Patient Weaning Progress    The patient's vent settings was able to be weaned this shift. Ventilator settings that were weaned              [x] Mode   [] Pressure support weaned   [] Fio2 weaned   [] Peep weaned      Spontaneous weaning trial  was attempted. due to defined parameters for SBT (spontaneous breathing trial) not being met. Reason that defined ventilator parameters for SBT was not met              [] Patient condition requires increased ventilator settings  [] Requires increased sedation   [] Settings not within weaning range   [] SAT not completed   [] Physician orders    The patient was able to tolerate SBT. RSBI  was 41 with EtCO2 of 36 and SpO2 of 100 on 25% FiO2. Spontanteous VT was 406 and RR 17 breaths/min. Patient placed on SBT at 0517 AM    Evac tube was not  hooked up with continuous low suction(20-30mmHg)      Cuff was not  deflated to determine cuff leak. Unable to get agreement for goals because no family is present and patient cannot respond.

## 2023-10-16 NOTE — PROGRESS NOTES
Pt is a 72y. o. female in 4D-08.  was summoned to room due to a Code Blue alert-being on the unit already. Pt spouse present for the entirety of the C.B., with his daughter arriving about 20 minutes later.  voicing displeasure with how the process went, leading up to C.B.- passed along to [employer] for noting.  provided presence and silent prayer for careteam as they worked with pt, accompanying pt spouse and then daughter upon arrival, offering words of comfort and addressing concerns when raised.  prayed for pt with spouse in the hallway. 10/16/23 1722   Encounter Summary   Encounter Overview/Reason  Crisis   Service Provided For: Patient and family together   Referral/Consult From: Multi-disciplinary team   Support System Spouse; Children   Last Encounter  10/16/23  (NR)   Complexity of Encounter High   Begin Time 1249   End Time  1319   Total Time Calculated 30 min   Crisis   Type Code Blue   Assessment/Intervention/Outcome   Assessment Unable to assess   Intervention Sustaining Presence/Ministry of presence;Prayer (assurance of)/Blanco;Nurtured Hope; Active listening;Explored/Affirmed feelings, thoughts, concerns   Outcome Engaged in conversation;Expressed feelings, needs, and concerns;Expressed Gratitude;Receptive;Coping

## 2023-10-16 NOTE — CODE DOCUMENTATION
1258 ETT retracted to 18cm, per Dr Yoshi Wong. Mario chest rise and fall.   1301 Stat portable chest ordered. [address] Dr Mckenna Pole bronchoscopy at bedside. Dr Emanuel Sears remains present at bedside  [phone] Pt attached to ventilator. Dr Emanuel Sears speaking with family. Family remained present outside of room throughout entire code blue, supported by spiritual care. [facility] in room for xray.

## 2023-10-16 NOTE — PROGRESS NOTES
Intubation attempted completed by Dr. Benoit Batch x 3 attempts patient has a size 5 et tube in.    Please see Dr. Alexa Valle charting

## 2023-10-16 NOTE — PROGRESS NOTES
Glidescope outside patients room size 6 and 6.5 tube added to glidescope.  Glidescope was prepared with  size 5 7/ 7.5 /8 and boujie and bronch scope size 5

## 2023-10-16 NOTE — PROGRESS NOTES
Pt weaned of all sedation. See MAR. Pt awake, alert, calm, and following all commands. Pt did well with SAT/SBT. All Vitals stable. Approximately 1215, writer notified Lakia Pop RRT that Dr. Nhung Vera had previously given verbal order to extubate. At this time, writer confirmed with Dr. Nhung Vera that writer would put in the order for extubation into [facility] and that Jazmine Carpenter was ready to extubate. Dr. Nhung Vera was in agreement and order placed into Epic by Yazmin Redding. Per instruction from [facility], a #6 ETT is present in case of need for emergent re-intubation and pt should be extubated straight to BIPAP 12/6.

## 2023-10-16 NOTE — PROGRESS NOTES
Patient has been successfully weaned from Mechanical Ventilation. RSBI before extubation was 21 with EtCO2 of 32 and SpO2 of 100 on 21% FiO2. Patient extubated and placed on 30% O2 via  bipap . Post extubation SpO2 is 100% with HR  74 bpm and RR 18 breaths/min. Patient had mild cough that was non-productive. Patient was extubated to bipap, patient stated after 2 min that she could not breath sats dropped to 53%. Bipap setting were adjusted from ps 12 to ps 18 and peep of 10 and 100%. Went to Kati Briggs office to ask for patient to be reintubated.

## 2023-10-16 NOTE — FLOWSHEET NOTE
[address] pt per Lucy  RT. Dr Zaman Room, Dr Mario Kaiser at bedside preparing to intubate. POX not good pleath.    1245 Continue bagging,

## 2023-10-17 ENCOUNTER — APPOINTMENT (OUTPATIENT)
Dept: MRI IMAGING | Age: 73
DRG: 163 | End: 2023-10-17
Attending: OTOLARYNGOLOGY
Payer: MEDICARE

## 2023-10-17 ENCOUNTER — APPOINTMENT (OUTPATIENT)
Dept: CT IMAGING | Age: 73
DRG: 163 | End: 2023-10-17
Attending: OTOLARYNGOLOGY
Payer: MEDICARE

## 2023-10-17 PROBLEM — R56.9 SEIZURE-LIKE ACTIVITY (HCC): Status: ACTIVE | Noted: 2023-10-17

## 2023-10-17 PROBLEM — I46.9 CARDIAC ARREST (HCC): Status: ACTIVE | Noted: 2023-10-17

## 2023-10-17 LAB
ANION GAP SERPL CALC-SCNC: 10 MEQ/L (ref 8–16)
ANION GAP SERPL CALC-SCNC: 10 MEQ/L (ref 8–16)
BUN SERPL-MCNC: 10 MG/DL (ref 7–22)
BUN SERPL-MCNC: 9 MG/DL (ref 7–22)
CA-I BLD ISE-SCNC: 1.15 MMOL/L (ref 1.12–1.32)
CALCIUM SERPL-MCNC: 8.2 MG/DL (ref 8.5–10.5)
CALCIUM SERPL-MCNC: 8.3 MG/DL (ref 8.5–10.5)
CHLORIDE SERPL-SCNC: 109 MEQ/L (ref 98–111)
CHLORIDE SERPL-SCNC: 110 MEQ/L (ref 98–111)
CO2 SERPL-SCNC: 24 MEQ/L (ref 23–33)
CO2 SERPL-SCNC: 24 MEQ/L (ref 23–33)
CREAT SERPL-MCNC: 0.5 MG/DL (ref 0.4–1.2)
CREAT SERPL-MCNC: 0.7 MG/DL (ref 0.4–1.2)
DEPRECATED RDW RBC AUTO: 45.1 FL (ref 35–45)
ERYTHROCYTE [DISTWIDTH] IN BLOOD BY AUTOMATED COUNT: 14.1 % (ref 11.5–14.5)
GFR SERPL CREATININE-BSD FRML MDRD: > 60 ML/MIN/1.73M2
GFR SERPL CREATININE-BSD FRML MDRD: > 60 ML/MIN/1.73M2
GLUCOSE BLD STRIP.AUTO-MCNC: 157 MG/DL (ref 70–108)
GLUCOSE BLD STRIP.AUTO-MCNC: 160 MG/DL (ref 70–108)
GLUCOSE BLD STRIP.AUTO-MCNC: 165 MG/DL (ref 70–108)
GLUCOSE BLD STRIP.AUTO-MCNC: 175 MG/DL (ref 70–108)
GLUCOSE BLD STRIP.AUTO-MCNC: 203 MG/DL (ref 70–108)
GLUCOSE SERPL-MCNC: 193 MG/DL (ref 70–108)
GLUCOSE SERPL-MCNC: 210 MG/DL (ref 70–108)
HCT VFR BLD AUTO: 30.7 % (ref 37–47)
HEPARIN UNFRACTIONATED: < 0.04 U/ML (ref 0.3–0.7)
HGB BLD-MCNC: 9.9 GM/DL (ref 12–16)
MAGNESIUM SERPL-MCNC: 1.7 MG/DL (ref 1.6–2.4)
MAGNESIUM SERPL-MCNC: 1.7 MG/DL (ref 1.6–2.4)
MCH RBC QN AUTO: 28.7 PG (ref 26–33)
MCHC RBC AUTO-ENTMCNC: 32.2 GM/DL (ref 32.2–35.5)
MCV RBC AUTO: 89 FL (ref 81–99)
PLATELET # BLD AUTO: 250 THOU/MM3 (ref 130–400)
PMV BLD AUTO: 10.4 FL (ref 9.4–12.4)
POTASSIUM SERPL-SCNC: 3.2 MEQ/L (ref 3.5–5.2)
POTASSIUM SERPL-SCNC: 3.5 MEQ/L (ref 3.5–5.2)
RBC # BLD AUTO: 3.45 MILL/MM3 (ref 4.2–5.4)
SODIUM SERPL-SCNC: 143 MEQ/L (ref 135–145)
SODIUM SERPL-SCNC: 144 MEQ/L (ref 135–145)
WBC # BLD AUTO: 14.5 THOU/MM3 (ref 4.8–10.8)

## 2023-10-17 PROCEDURE — 2700000000 HC OXYGEN THERAPY PER DAY

## 2023-10-17 PROCEDURE — 83735 ASSAY OF MAGNESIUM: CPT

## 2023-10-17 PROCEDURE — 82948 REAGENT STRIP/BLOOD GLUCOSE: CPT

## 2023-10-17 PROCEDURE — 94003 VENT MGMT INPAT SUBQ DAY: CPT

## 2023-10-17 PROCEDURE — 6360000002 HC RX W HCPCS: Performed by: NURSE PRACTITIONER

## 2023-10-17 PROCEDURE — A4216 STERILE WATER/SALINE, 10 ML: HCPCS | Performed by: NURSE PRACTITIONER

## 2023-10-17 PROCEDURE — A9579 GAD-BASE MR CONTRAST NOS,1ML: HCPCS | Performed by: NURSE PRACTITIONER

## 2023-10-17 PROCEDURE — 02HV33Z INSERTION OF INFUSION DEVICE INTO SUPERIOR VENA CAVA, PERCUTANEOUS APPROACH: ICD-10-PCS | Performed by: INTERNAL MEDICINE

## 2023-10-17 PROCEDURE — 85520 HEPARIN ASSAY: CPT

## 2023-10-17 PROCEDURE — 99024 POSTOP FOLLOW-UP VISIT: CPT | Performed by: PHYSICIAN ASSISTANT

## 2023-10-17 PROCEDURE — 95718 EEG PHYS/QHP 2-12 HR W/VEEG: CPT | Performed by: PSYCHIATRY & NEUROLOGY

## 2023-10-17 PROCEDURE — 2580000003 HC RX 258: Performed by: PHARMACIST

## 2023-10-17 PROCEDURE — 6360000004 HC RX CONTRAST MEDICATION: Performed by: NURSE PRACTITIONER

## 2023-10-17 PROCEDURE — 95711 VEEG 2-12 HR UNMONITORED: CPT

## 2023-10-17 PROCEDURE — 6370000000 HC RX 637 (ALT 250 FOR IP): Performed by: NURSE PRACTITIONER

## 2023-10-17 PROCEDURE — 2580000003 HC RX 258: Performed by: NURSE PRACTITIONER

## 2023-10-17 PROCEDURE — 85027 COMPLETE CBC AUTOMATED: CPT

## 2023-10-17 PROCEDURE — 2580000003 HC RX 258: Performed by: EMERGENCY MEDICINE

## 2023-10-17 PROCEDURE — 70450 CT HEAD/BRAIN W/O DYE: CPT

## 2023-10-17 PROCEDURE — 95813 EEG EXTND MNTR 61-119 MIN: CPT

## 2023-10-17 PROCEDURE — 70553 MRI BRAIN STEM W/O & W/DYE: CPT

## 2023-10-17 PROCEDURE — 6360000002 HC RX W HCPCS: Performed by: EMERGENCY MEDICINE

## 2023-10-17 PROCEDURE — 94761 N-INVAS EAR/PLS OXIMETRY MLT: CPT

## 2023-10-17 PROCEDURE — 4A10X4Z MONITORING OF CENTRAL NERVOUS ELECTRICAL ACTIVITY, EXTERNAL APPROACH: ICD-10-PCS | Performed by: PSYCHIATRY & NEUROLOGY

## 2023-10-17 PROCEDURE — 2500000003 HC RX 250 WO HCPCS: Performed by: NURSE PRACTITIONER

## 2023-10-17 PROCEDURE — C1751 CATH, INF, PER/CENT/MIDLINE: HCPCS

## 2023-10-17 PROCEDURE — 2000000000 HC ICU R&B

## 2023-10-17 PROCEDURE — 6370000000 HC RX 637 (ALT 250 FOR IP): Performed by: STUDENT IN AN ORGANIZED HEALTH CARE EDUCATION/TRAINING PROGRAM

## 2023-10-17 PROCEDURE — 36415 COLL VENOUS BLD VENIPUNCTURE: CPT

## 2023-10-17 PROCEDURE — 2720000010 HC SURG SUPPLY STERILE

## 2023-10-17 PROCEDURE — 6360000002 HC RX W HCPCS: Performed by: PHARMACIST

## 2023-10-17 PROCEDURE — 82330 ASSAY OF CALCIUM: CPT

## 2023-10-17 PROCEDURE — 2580000003 HC RX 258: Performed by: INTERNAL MEDICINE

## 2023-10-17 PROCEDURE — 99292 CRITICAL CARE ADDL 30 MIN: CPT | Performed by: INTERNAL MEDICINE

## 2023-10-17 PROCEDURE — 76937 US GUIDE VASCULAR ACCESS: CPT

## 2023-10-17 PROCEDURE — 36569 INSJ PICC 5 YR+ W/O IMAGING: CPT

## 2023-10-17 PROCEDURE — 99291 CRITICAL CARE FIRST HOUR: CPT | Performed by: INTERNAL MEDICINE

## 2023-10-17 PROCEDURE — 80048 BASIC METABOLIC PNL TOTAL CA: CPT

## 2023-10-17 RX ORDER — POTASSIUM CHLORIDE 7.45 MG/ML
10 INJECTION INTRAVENOUS ONCE
Status: COMPLETED | OUTPATIENT
Start: 2023-10-18 | End: 2023-10-18

## 2023-10-17 RX ORDER — 3% SODIUM CHLORIDE 3 G/100ML
15 INJECTION, SOLUTION INTRAVENOUS CONTINUOUS
Status: DISPENSED | OUTPATIENT
Start: 2023-10-17 | End: 2023-10-18

## 2023-10-17 RX ORDER — SCOLOPAMINE TRANSDERMAL SYSTEM 1 MG/1
1 PATCH, EXTENDED RELEASE TRANSDERMAL
Status: DISCONTINUED | OUTPATIENT
Start: 2023-10-18 | End: 2023-11-10

## 2023-10-17 RX ORDER — MAGNESIUM SULFATE IN WATER 40 MG/ML
2000 INJECTION, SOLUTION INTRAVENOUS ONCE
Status: COMPLETED | OUTPATIENT
Start: 2023-10-18 | End: 2023-10-18

## 2023-10-17 RX ORDER — POTASSIUM CHLORIDE 29.8 MG/ML
20 INJECTION INTRAVENOUS
Status: DISPENSED | OUTPATIENT
Start: 2023-10-18 | End: 2023-10-18

## 2023-10-17 RX ADMIN — VANCOMYCIN HYDROCHLORIDE 1000 MG: 1 INJECTION, POWDER, LYOPHILIZED, FOR SOLUTION INTRAVENOUS at 11:13

## 2023-10-17 RX ADMIN — CHLORHEXIDINE GLUCONATE 0.12% ORAL RINSE 15 ML: 1.2 LIQUID ORAL at 20:56

## 2023-10-17 RX ADMIN — LEVETIRACETAM 1000 MG: 100 INJECTION, SOLUTION INTRAVENOUS at 18:24

## 2023-10-17 RX ADMIN — SODIUM CHLORIDE, PRESERVATIVE FREE 10 ML: 5 INJECTION INTRAVENOUS at 20:53

## 2023-10-17 RX ADMIN — PIPERACILLIN AND TAZOBACTAM 3375 MG: 3; .375 INJECTION, POWDER, LYOPHILIZED, FOR SOLUTION INTRAVENOUS at 18:28

## 2023-10-17 RX ADMIN — LEVETIRACETAM 1000 MG: 100 INJECTION, SOLUTION INTRAVENOUS at 04:43

## 2023-10-17 RX ADMIN — PIPERACILLIN AND TAZOBACTAM 3375 MG: 3; .375 INJECTION, POWDER, LYOPHILIZED, FOR SOLUTION INTRAVENOUS at 00:11

## 2023-10-17 RX ADMIN — GADOTERIDOL 15 ML: 279.3 INJECTION, SOLUTION INTRAVENOUS at 17:47

## 2023-10-17 RX ADMIN — PIPERACILLIN AND TAZOBACTAM 3375 MG: 3; .375 INJECTION, POWDER, LYOPHILIZED, FOR SOLUTION INTRAVENOUS at 06:14

## 2023-10-17 RX ADMIN — ACETAMINOPHEN 650 MG: 650 SOLUTION ORAL at 00:35

## 2023-10-17 RX ADMIN — PROPOFOL 40 MCG/KG/MIN: 10 INJECTION, EMULSION INTRAVENOUS at 00:20

## 2023-10-17 RX ADMIN — ACETAMINOPHEN 650 MG: 650 SOLUTION ORAL at 20:56

## 2023-10-17 RX ADMIN — SODIUM CHLORIDE 15 ML/HR: 3 INJECTION, SOLUTION INTRAVENOUS at 18:17

## 2023-10-17 RX ADMIN — VANCOMYCIN HYDROCHLORIDE 1000 MG: 1 INJECTION, POWDER, LYOPHILIZED, FOR SOLUTION INTRAVENOUS at 22:53

## 2023-10-17 RX ADMIN — CHLORHEXIDINE GLUCONATE 0.12% ORAL RINSE 15 ML: 1.2 LIQUID ORAL at 11:13

## 2023-10-17 RX ADMIN — SODIUM CHLORIDE, PRESERVATIVE FREE 20 MG: 5 INJECTION INTRAVENOUS at 20:56

## 2023-10-17 RX ADMIN — MAGNESIUM SULFATE HEPTAHYDRATE 2000 MG: 40 INJECTION, SOLUTION INTRAVENOUS at 23:57

## 2023-10-17 RX ADMIN — SODIUM CHLORIDE, PRESERVATIVE FREE 10 ML: 5 INJECTION INTRAVENOUS at 10:49

## 2023-10-17 RX ADMIN — INSULIN LISPRO 2 UNITS: 100 INJECTION, SOLUTION INTRAVENOUS; SUBCUTANEOUS at 02:42

## 2023-10-17 RX ADMIN — SODIUM CHLORIDE, PRESERVATIVE FREE 20 MG: 5 INJECTION INTRAVENOUS at 10:34

## 2023-10-17 ASSESSMENT — PULMONARY FUNCTION TESTS
PIF_VALUE: 20
PIF_VALUE: 21
PIF_VALUE: 21
PIF_VALUE: 20
PIF_VALUE: 21
PIF_VALUE: 24

## 2023-10-17 NOTE — PLAN OF CARE
Problem: Respiratory - Adult  Goal: Achieves optimal ventilation and oxygenation  10/17/2023 0515 by Nila Guzman RCP  Outcome: Progressing                                                Patient Weaning Progress    The patient's vent settings was able to be weaned this shift. Ventilator settings that were weaned              [] Mode   [x] Pressure support weaned   [x] Fio2 weaned   [x] Peep weaned      Spontaneous weaning trial  was not attempted. due to defined parameters for SBT (spontaneous breathing trial) not being met. Reason that defined ventilator parameters for SBT was not met              [] Patient condition requires increased ventilator settings  [] Requires increased sedation   [x] Settings not within weaning range   [] SAT not completed   [] Physician orders      Evac tube was not  hooked up with continuous low suction(20-30mmHg)      Cuff was  deflated to determine cuff leak. Unable to get agreement for goals because no family is present and patient cannot respond.

## 2023-10-17 NOTE — CONSULTS
Neurology Consult Note    Date:10/17/2023       Room:[address] [place], [place] [address]A  Patient Chelsy Gould     YOB: 1950     Age:72 y.o. Requesting Physician: Porfirio Balderas MD     Reason for Consult:  Evaluate for Abnormal EEG      Chief Complaint: No chief complaint on file. Nadeen Fraser is a 81 y.o. 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 On 10/15/2023 she was placed on spontaneous breathing trial for 5 minutes and was able to tolerate this. On 10/16/2023 she failed her spontaneous breathing trial and had to go back on AC/PC due to going apneic in the morning but then later in the morning she tolerated SBT and was extubated around 1236 on 10/16/23 to BIPAP. After 2 minutes she stated she could not breathe and her oxygen saturation dropped to 53%. Her BiPAP settings were increased and primary team was made aware of need for reintubation. By the time the primary team arrived she was hypoxic and receiving CPR as she went into cardiac arrest secondary to respiratory arrest. Due to the complexity of her airway, it took multiple attempts to secure her airway with ETT. She has been off sedation since 0711 on 10/17/2023. EEG is showing no seizure activity and minimal brain activity. She reportedly had seizure-like activity after the code yesterday, but no further seizure activity noted. She currently is not following commands on exam.  She does withdraw/posture to painful stimulation.     Review of Systems   Review of Systems   Unable to perform ROS: Intubated     Medications   Scheduled Meds:    levETIRAcetam  1,000 mg IntraVENous Q12H    vancomycin  1,000 mg

## 2023-10-17 NOTE — SIGNIFICANT EVENT
Long conversation with patient's . He is appropriately angry and stating this (scenario) should never have happened. He stated that I should have been at the bedside at extubation for the possibility of failed extubation. I explained that I agree that the extubation did not go as planned. The plan was to have all the rescue equipment at bedside with the senior resident at bedside for the extubation. Unfortunately, the patient was extubated without the resident at the bedside. I explained that there is an investigation to find out where the breakdown occurred. I discussed with him that the patient has severe brain injury from the respiratory arrest causing cardiac arrest.  I explained the lack of blood flow to the brain caused anoxic injury. I explained that a second injury occurred to the brain with re-established blood flow. This injury is re-perfusion injury. I discussed that the injury can last 5-7 days and makes it difficult to accurately predict lack of recoverability. The EEG is showing no seizure activity and showing minimal brain activity. The CT head was read as normal, but my read shows blunting of Angelito Lovings and [employer] boarders. I suspect this represents early cerebral edema or mild edema. I explained the benefits to hypertonic saline in decreasing cerebral edema. Goal is to keep serum sodium between 140-150. I initially wanted to keep EEG running.  related that neurology wanted to get MRI brain today. I verified with neurology and have requested MRI today. I explained that we are looking for structural abnormalities which may help in the assessment of recoverability. I further explained that a repeat CT head and possibly MRI head may be necessary in the morning. I explained the difference of an overnight read and oversight read and that the second read of the MRI would be the final read of the MRI and may have differences compared with the first read.     I expressed

## 2023-10-17 NOTE — PLAN OF CARE
Problem: Respiratory - Adult  Goal: Achieves optimal ventilation and oxygenation  10/17/2023 0515 by Cristina Cabrera RCP  Outcome: Progressing     Problem: Respiratory - Adult  Goal: Normal spontaneous ventilation  Outcome: Not Progressing                                               Patient Weaning Progress    The patient's vent settings were able to be weaned this shift. Ventilator settings that were weaned              [] Mode   [] Pressure support weaned   [x] Fio2 weaned   [] Peep weaned      Spontaneous weaning trial  was not attempted. due to defined parameters for SBT (spontaneous breathing trial) not being met. Reason that defined ventilator parameters for SBT was not met              [x] Patient condition requires increased ventilator settings  [] Requires increased sedation   [] Settings not within weaning range   [] SAT not completed   [] Physician orders    Cuff was not  deflated to determine cuff leak. Unable to get agreement for goals because no family is present and patient cannot respond.

## 2023-10-17 NOTE — PROCEDURES
LONG-TERM EEG-VIDEO [address] [facility] [address] [employer]    Patient: Keke Gonzalez  Age: 81 y.o. MRN: [id number]    Referring Physician: No ref. provider found  History: The patient is a 81 y.o. female who had a cardiac arrest and subsequent jerks concerning for seizures. This long-term video-EEG monitoring study was performed to evaluate for seizures. The patient is on neuroactive medications.    Jessica Sierra   Current Facility-Administered Medications   Medication Dose Route Frequency Provider Last Rate Last Admin    norepinephrine (LEVOPHED) 16 mg in sodium chloride 0.9 % 250 mL infusion  1-100 mcg/min IntraVENous Continuous Bi Yoon MD   Stopped at 10/17/23 0532    levETIRAcetam (KEPPRA) 1,000 mg in sodium chloride 0.9 % 100 mL IVPB  1,000 mg IntraVENous Q12H Saba Del Toro DO   Stopped at 10/17/23 0458    LORazepam (ATIVAN) injection 4 mg  4 mg IntraVENous Q5 Min PRN Tonya Victor APRN - CNP        vancomycin (VANCOCIN) 1,000 mg in sodium chloride 0.9 % 250 mL IVPB ([id number])  1,000 mg IntraVENous Q12H Estephania Goldberg [facility]   Stopped at 10/17/23 1216    sodium chloride flush 0.9 % injection 5-40 mL  5-40 mL IntraVENous 2 times per day Zhanna Muniz, APRN - CNP   10 mL at 10/17/23 1049    sodium chloride flush 0.9 % injection 5-40 mL  5-40 mL IntraVENous PRN Zhanna Muniz, APRN - CNP        0.9 % sodium chloride infusion   IntraVENous PRN Zhanna Muniz, APRN - CNP 20 mL/hr at 10/17/23 0749 Rate Verify at 10/17/23 0749    polyethylene glycol (GLYCOLAX) packet 17 g  17 g Per NG tube Daily PRN Zhanna Mnuiz, APRN - CNP        ondansetron (ZOFRAN-ODT) disintegrating tablet 4 mg  4 mg Oral Q8H PRN Zhanna Muniz, APRN - CNP        Or    ondansetron (ZOFRAN) injection 4 mg  4 mg IntraVENous Q6H PRN Zhanna Muniz, APRN - CNP        HYDROmorphone (DILAUDID) injection 0.25 mg  0.25 mg IntraVENous Q3H PRN Description: This is a 21-channel digital EEG recording with time-locked video. Electrodes were placed in accordance with the 10-20 International System of Electrode Placement. Single lead EKG monitoring was included. Baseline EEG Recording:  A formal baseline EEG recording was not obtained. Day 1 - 10/17/23, starting at 08:02, interrupted between 13:14-14:56, ending at 15:55    Interictal EEG Samples: The EEG was obtained during an encephalopathic state. The background was discontinuous and diffusely attenuated, with periods of diffuse suppression (amplitudes <10 uV). With stimulation, there was diffuse EMG artifact but no clear change in EEG activity indicating no clear evidence of reactivity. State changes were not observed. With photic stimulation, there was evidence of electroretinographic discharges and no driving response. There was significant movement, including eye movement, and EMG artifact. Ictal EEG Recording / Patient Events: During this period the patient had no events or seizures. Summary: During this day of recording no events were recorded. The interictal EEG was abnormal due to:  -Diffuse background attenuation and periods of suppression without evidence of reactivity    The background abnormalities indicated a severe encephalopathy, nonspecific to etiology. No seizures were recorded. Monitoring was discontinued at 15:55. The EKG channel revealed no abnormalities. Eliud Vazquez DO  Neurology/Epilepsy       Summary of EEG and Behavior: The interictal EEG was abnormal due to:  -Diffuse background attenuation and periods of suppression without evidence of reactivity    Clinical Correlation: This 1 day EEG recording was abnormal. The background abnormalities indicated a severe encephalopathy, nonspecific to etiology. No seizures were recorded. Given the patient's history, a repeat EEG or LTME can be considered, if clinically indicated.  Findings discussed with

## 2023-10-17 NOTE — PROGRESS NOTES
[facility] Laboratory Technician Worksheet      EEG Date: 10/17/2023    Name: Rhea Rojo   : 1950   Age: 81 y.o. SEX: female    ROOM: [facility] MRN: [id number]           CSN: [id number]      Ordering Provider: Melia Beltran  EEG Number: [id number] Time of Test:  0802    Hand: intubated   Sedation: no Propofol turned off @0711    H.V. Done: No  intubated  Photic: Yes    Sleep: Yes  Drowsy: Yes   Sleep Deprived: No    Seizures observed: no    Mentality: stuperous      Clinical History: pt was a code Blue  10/17 around 1620 after extubation, pt was Hypoxic and had to be re intubated, pt was having jerking movements concerns for seizures Vs. 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    Hyperlipidemia     Lower back pain     With diagnosis of having \"stress fracture\" by ortho at [facility]    Primary hypertension     Pulmonary embolism ([address]) 2023    Right lower lobe    Type 2 diabetes mellitus ([address])        Scheduled Meds:   levETIRAcetam  1,000 mg IntraVENous Q12H    vancomycin  1,000 mg IntraVENous Q12H    sodium chloride flush  5-40 mL IntraVENous 2 times per day    chlorhexidine  15 mL Mouth/Throat BID    piperacillin-tazobactam  3,375 mg IntraVENous Q8H    famotidine (PEPCID) injection  20 mg IntraVENous BID    vancomycin (VANCOCIN) intermittent dosing (placeholder)   Other RX Placeholder    insulin lispro  0-8 Units SubCUTAneous Q4H    insulin lispro  0-4 Units SubCUTAneous Nightly     Continuous Infusions:   norepinephrine Stopped (10/17/23 0532)    sodium chloride 20 mL/hr at 10/17/23 0749    propofol Stopped (10/17/23 0711)    dextrose       PRN Meds:. LORazepam, sodium

## 2023-10-17 NOTE — PROGRESS NOTES
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]  History of resection and anastomosis of trachea [Z90.09]      Attending Physician- Ruby Rene MD  Ordering Physician-same  Indication for Insertion: Poor Vascular Access    Catheter Insertion Date- 10/17/2023   Lot Number- [id number]  Gauge-4  Lumen-triple    Insertion Site- ARLYN Basilic  Vein Diameter- 9.46 mm  Catheter Length- 40 cm  Internal Length- 40 cm  Exposed Catheter Length- 0cm   Upper Arm Circumference- 40cm  Tip Confirmation System Bundle met- Yes  If X-ray required, Tip Location- n/a  Radiologist- n/a    PICC insertion successful- Yes  Ultrasound- yes    Okay To Use PICC- Yes    Electronically signed by Gian Garcia RN, RN on 10/17/2023 at 3:34 PM

## 2023-10-17 NOTE — PLAN OF CARE
Problem: Discharge Planning  Goal: Discharge to home or other facility with appropriate resources  Outcome: Progressing  Flowsheets (Taken 10/16/2023 2004)  Discharge to home or other facility with appropriate resources: Identify barriers to discharge with patient and caregiver     Problem: Chronic Conditions and Co-morbidities  Goal: Patient's chronic conditions and co-morbidity symptoms are monitored and maintained or improved  Outcome: Progressing  Flowsheets (Taken 10/16/2023 2004)  Care Plan - Patient's Chronic Conditions and Co-Morbidity Symptoms are Monitored and Maintained or Improved:   Monitor and assess patient's chronic conditions and comorbid symptoms for stability, deterioration, or improvement   Collaborate with multidisciplinary team to address chronic and comorbid conditions and prevent exacerbation or deterioration     Problem: Safety - Adult  Goal: Free from fall injury  Outcome: Progressing  Flowsheets (Taken 10/16/2023 2210)  Free From Fall Injury:   Instruct family/caregiver on patient safety   Based on caregiver fall risk screen, instruct family/caregiver to ask for assistance with transferring infant if caregiver noted to have fall risk factors     Problem: Pain  Goal: Verbalizes/displays adequate comfort level or baseline comfort level  Outcome: Progressing  Flowsheets  Taken 10/16/2023 2000 by Gonzalo Galvez RN  Verbalizes/displays adequate comfort level or baseline comfort level:   Encourage patient to monitor pain and request assistance   Assess pain using appropriate pain scale  Taken 10/16/2023 0900 by Monie Ibarra RN  Verbalizes/displays adequate comfort level or baseline comfort level:   Encourage patient to monitor pain and request assistance   Assess pain using appropriate pain scale   Administer analgesics based on type and severity of pain and evaluate response   Implement non-pharmacological measures as appropriate and evaluate response     Problem: Respiratory - Spiritual Care, Psychosocial Clinical Specialist consults as needed  Outcome: Progressing  Flowsheets (Taken 10/16/2023 2210)  Patient/family able to verbalize anxieties, fears, and concerns, and demonstrate effective coping:   Assist patient/family to identify coping skills, available support systems and cultural and spiritual values   Provide emotional support, including active listening and acknowledgement of concerns of patient and caregivers    Care plan reviewed with patient and family. Family verbalize understanding of the plan of care and contribute to goal setting. Patient unable to verbalize understanding of the plan of care or contribute to goal setting due to intubation and sedation.

## 2023-10-17 NOTE — CARE COORDINATION
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 There is also nonocclusive thrombus within the left posterior tibial vein  10/16 Extubated to bipap  10/16 Code Blue  10/16 Reintubated  10/16 Bronch: thick bloody secretions in RLL removed  10/16 CXR: Mild stable cardiomegaly with pulmonary vascular congestion suspicious for congestive heart failure; Prominent pulmonary interstitium suspicious for pulmonary edema  10/17 5 hr EEG: No seizures noted; diffuse background attenuation and suppression without evidence of reactivity; severe encephalopathy  10/17 CT Head: Stable CT appearance the brain. No acute findings    Barriers to Discharge: POD 5. Patient was extubated yesterday to bipap. Code Blue yesterday, hypoxic, receiving CPR, with difficult intubation d/t secretions and narrow airway. On 3rd intubation attempt was able to get a 5.0 ETT into the airway. Emergent bronch was done showing bloody secretions, cleared with irrigation and suction. EEG done - see note above. Neurology consulted d/t abnormal EEG. Plan to start 3% saline drip. Sedation stopped this morning at 0711. MRI Brain ordered d/t anoxic brain injury. Remains on vent w/ETT on AC/PC, peep 6, FIO2 25%, sats 97%. Tmax 100.7. NSR. Unable to follow commands. RACHEL x2. Intensivist and ENT following. Dietitian consulted. Wound care consulted for wound present on admission. Telemetry, ALYSE alexis to alphonso MARTINS, SCDs.  Peridex, IV pepcid, prn IV dilaudid, SSI, IV keppra, prn roxicodone, IV zosyn, IV vancomycin. WBC 14.5, hgb 9.9. PCP: Haskel Oppenheim, MD  Readmission Risk Score: 19.3%  Patient Goals/Plan/Treatment Preferences: From home w/. Plan pending clinical course.

## 2023-10-17 NOTE — PROGRESS NOTES
[facility]--. [address] [facility] PROGRESS NOTE      Patient: Keke Gonzalez  Room #: 4D-08/008-A            YOB: 1950  Age: 81 y.o. Gender: female            Admit Date & Time: 10/12/2023  6:19 AM    Assessment:    The patient was attempted to be visited and is currently in active care. The care is such to inhibit a spiritual care visit at this time. Interventions:  A the patient was unable to be visited at this time. A prayer was provided outside the patient's room. Outcomes: The patient continue to be followed by spiritual care. Plan: 1. Spiritual care will continue to follow the patient according to [facility]. Tiffany's spiritual care SOP.        Electronically signed by Jose Eduardo Clark on 10/17/2023 at 9:50 AM.  eLno  [phone]

## 2023-10-17 NOTE — PROGRESS NOTES
CRITICAL CARE PROGRESS NOTE      Patient:  James Paz    Unit/Bed:4D-08/008-A  YOB: 1950  MRN: [id number]   PCP: Miladis Singletary MD  Date of Admission: 10/12/2023  Chief Complaint:- Tracheal stenosis z/p resection    Assessment and Plan:    S/p cricotracheal resection, laryngoplasty with flap and vocal fold lateralization stitch placement:  POD #5 with Dr. Marcelino Franco, ENT. H/0 complex COVID-19 ---> Trach ---> tracheal stenosis. Pt is trach dependent historically, has posterior glottic stenosis, and cricoarytenoid joint fixation. Possible changing of ETT from #5 to #6 - by Marcelino Franco (ENT). Possible return to OR tomorrow for revision of laryngoplasty. Closely monitor bilateral RACHEL drains. Maintain HOB elevation at 30 degrees with two pillows under head to keep head in semi-flexed position, especially with transport. Anoxic brain injury: 2/2 respiratory arrest, followed by cardiac arrest.    10/16 patient unable to tolerate trial of extubation. Pt was difficult to re-intubate after. Suffered respiratory arrest, followed by cardiac arrest, which led to anoxic brain injury. Please refer to Significant Event note by Dr. FARLEY (10/16) - for detailed description of events. 10/17 EEG x 4 hours done showed diffuse background attenuation and suppression without evidence of reactivity. No seizures recorded. Head CT done today showed no acute findings. Plan to get continuous EEG and brain MRI at this time. Leukocytosis:  10/17 WBC 14.5.  suspected 2/2 steroid use. Afebrile T 99.7 F. No s/s of acute infection. Continue Vanc, Zosyn (for SSI ppx). Flagyl D/C'ed 10/16 - per ENT. Bilateral DVTs:  superficial, noted from BLE duplex on 10/12. Non-occlusive thrombus in bilateral PT veins, and R peroneal vein. ENT wanted to withold anticoagulation following surgery. The decision was made to to start heparin gtt for treatment of these DVTs on 10/14, in conjunction with ENT.   Heparin

## 2023-10-17 NOTE — PROGRESS NOTES
at bedside-updated on pt condition. 1350- EEG stopped for CT scan. 1400- Pt taken to radiology for CT scan of head. 1430- Returned from radiology  1440- EEG resumed. 1530- EEG stopped  for MRI. 1545- Family at bedside. 1700- To MRI  with RT and RN. 1800- Returned from MRI. Neuro assess unchanged throughout the day. Pt decerebrate with arms to deep pain. Slt movement of toes to deep pain. Eyes open slt to stimuli but deviate upward.

## 2023-10-17 NOTE — PROCEDURES
RAPID RESPONSE EEG REPORT     Patient: Rhea Rojo  Age: 81 MRN: [id number]      Testing Date: 10/17/2023     Ordering Physician: No ref. provider found      Introduction: This EEG study was recorded for a 81year old female who had a cardiac arrest followed by seizure like activity. This study was performed in the acute setting to screen for non-convulsive seizures and rhythmic patterns. Technical description: This 10-electrode (8-channel) circumferential EEG was performed using the Just[employer] headband device. The portable EEG recorder ([address], [address]/[facility], [facility]) records EEG from a bipolar array with a digital sampling rate of 250 Hz. This device displays and transmits data wirelessly to a cloud-hosted  for review through the HIPAA compliant EEG portal. This study was performed without simultaneous video. Rhea Rojo     Start time: 05:42  Stop time: 07:25    Background Description: The background was diffusely attenuated. There was significant movement and EMG artifact. There were no persistent focal abnormalities, epileptiform discharges, or seizures recorded during the EEG. Interpretation  This EEG did not reveal electrographic seizures or abnormal periodic patterns. There was diffuse background attenuation. Clinical correlation  This EEG was abnormal. The background abnormalities indicated a severe encephalopathy, nonspecific to etiology. There were no persistent focal abnormalities, epileptiform discharges, or seizures recorded during the EEG. Given the spatial limitations of circumferential EEG, focal parasagittal seizures may have been missed; however, there was no evidence for propensity to seizures on this study. Deborah James DO  Neurology/Epilepsy    Clinical Diagnosis Code:    Unspecified convulsions, R56.9

## 2023-10-18 ENCOUNTER — ANESTHESIA (OUTPATIENT)
Dept: OPERATING ROOM | Age: 73
End: 2023-10-18
Payer: MEDICARE

## 2023-10-18 ENCOUNTER — ANESTHESIA EVENT (OUTPATIENT)
Dept: OPERATING ROOM | Age: 73
End: 2023-10-18
Payer: MEDICARE

## 2023-10-18 ENCOUNTER — APPOINTMENT (OUTPATIENT)
Dept: CT IMAGING | Age: 73
DRG: 163 | End: 2023-10-18
Attending: OTOLARYNGOLOGY
Payer: MEDICARE

## 2023-10-18 PROBLEM — J38.6 GLOTTIC STENOSIS: Status: ACTIVE | Noted: 2023-10-18

## 2023-10-18 PROBLEM — J38.6 SUBGLOTTIC STENOSIS: Status: ACTIVE | Noted: 2023-10-18

## 2023-10-18 LAB
ANION GAP SERPL CALC-SCNC: 10 MEQ/L (ref 8–16)
ANION GAP SERPL CALC-SCNC: 7 MEQ/L (ref 8–16)
ANION GAP SERPL CALC-SCNC: 8 MEQ/L (ref 8–16)
ANION GAP SERPL CALC-SCNC: 9 MEQ/L (ref 8–16)
BUN SERPL-MCNC: 10 MG/DL (ref 7–22)
BUN SERPL-MCNC: 10 MG/DL (ref 7–22)
BUN SERPL-MCNC: 11 MG/DL (ref 7–22)
BUN SERPL-MCNC: 11 MG/DL (ref 7–22)
CALCIUM SERPL-MCNC: 8.1 MG/DL (ref 8.5–10.5)
CALCIUM SERPL-MCNC: 8.3 MG/DL (ref 8.5–10.5)
CALCIUM SERPL-MCNC: 8.4 MG/DL (ref 8.5–10.5)
CALCIUM SERPL-MCNC: 8.4 MG/DL (ref 8.5–10.5)
CHLORIDE SERPL-SCNC: 110 MEQ/L (ref 98–111)
CHLORIDE SERPL-SCNC: 110 MEQ/L (ref 98–111)
CHLORIDE SERPL-SCNC: 111 MEQ/L (ref 98–111)
CHLORIDE SERPL-SCNC: 112 MEQ/L (ref 98–111)
CO2 SERPL-SCNC: 23 MEQ/L (ref 23–33)
CO2 SERPL-SCNC: 24 MEQ/L (ref 23–33)
CO2 SERPL-SCNC: 25 MEQ/L (ref 23–33)
CO2 SERPL-SCNC: 25 MEQ/L (ref 23–33)
CREAT SERPL-MCNC: 0.6 MG/DL (ref 0.4–1.2)
CREAT SERPL-MCNC: 0.6 MG/DL (ref 0.4–1.2)
CREAT SERPL-MCNC: 0.7 MG/DL (ref 0.4–1.2)
CREAT SERPL-MCNC: 0.7 MG/DL (ref 0.4–1.2)
EKG ATRIAL RATE: 115 BPM
EKG P AXIS: 48 DEGREES
EKG P-R INTERVAL: 140 MS
EKG Q-T INTERVAL: 290 MS
EKG QRS DURATION: 74 MS
EKG QTC CALCULATION (BAZETT): 401 MS
EKG R AXIS: 31 DEGREES
EKG T AXIS: 97 DEGREES
EKG VENTRICULAR RATE: 115 BPM
GFR SERPL CREATININE-BSD FRML MDRD: > 60 ML/MIN/1.73M2
GLUCOSE BLD STRIP.AUTO-MCNC: 148 MG/DL (ref 70–108)
GLUCOSE BLD STRIP.AUTO-MCNC: 163 MG/DL (ref 70–108)
GLUCOSE SERPL-MCNC: 164 MG/DL (ref 70–108)
GLUCOSE SERPL-MCNC: 177 MG/DL (ref 70–108)
GLUCOSE SERPL-MCNC: 180 MG/DL (ref 70–108)
GLUCOSE SERPL-MCNC: 185 MG/DL (ref 70–108)
HEPARIN UNFRACTIONATED: < 0.04 U/ML (ref 0.3–0.7)
POTASSIUM SERPL-SCNC: 3.6 MEQ/L (ref 3.5–5.2)
POTASSIUM SERPL-SCNC: 3.7 MEQ/L (ref 3.5–5.2)
POTASSIUM SERPL-SCNC: 3.9 MEQ/L (ref 3.5–5.2)
POTASSIUM SERPL-SCNC: 4 MEQ/L (ref 3.5–5.2)
SODIUM SERPL-SCNC: 142 MEQ/L (ref 135–145)
SODIUM SERPL-SCNC: 143 MEQ/L (ref 135–145)
SODIUM SERPL-SCNC: 144 MEQ/L (ref 135–145)
SODIUM SERPL-SCNC: 145 MEQ/L (ref 135–145)
SODIUM SERPL-SCNC: 146 MEQ/L (ref 135–145)
SODIUM SERPL-SCNC: 147 MEQ/L (ref 135–145)
VANCOMYCIN SERPL-MCNC: 20 UG/ML (ref 0.1–39.9)

## 2023-10-18 PROCEDURE — 80202 ASSAY OF VANCOMYCIN: CPT

## 2023-10-18 PROCEDURE — 3700000001 HC ADD 15 MINUTES (ANESTHESIA): Performed by: OTOLARYNGOLOGY

## 2023-10-18 PROCEDURE — 2500000003 HC RX 250 WO HCPCS: Performed by: NURSE PRACTITIONER

## 2023-10-18 PROCEDURE — 80048 BASIC METABOLIC PNL TOTAL CA: CPT

## 2023-10-18 PROCEDURE — 2700000000 HC OXYGEN THERAPY PER DAY

## 2023-10-18 PROCEDURE — 6370000000 HC RX 637 (ALT 250 FOR IP): Performed by: NURSE PRACTITIONER

## 2023-10-18 PROCEDURE — 0BH17EZ INSERTION OF ENDOTRACHEAL AIRWAY INTO TRACHEA, VIA NATURAL OR ARTIFICIAL OPENING: ICD-10-PCS | Performed by: OTOLARYNGOLOGY

## 2023-10-18 PROCEDURE — 82948 REAGENT STRIP/BLOOD GLUCOSE: CPT

## 2023-10-18 PROCEDURE — 70450 CT HEAD/BRAIN W/O DYE: CPT

## 2023-10-18 PROCEDURE — 6360000002 HC RX W HCPCS: Performed by: EMERGENCY MEDICINE

## 2023-10-18 PROCEDURE — 99024 POSTOP FOLLOW-UP VISIT: CPT | Performed by: PHYSICIAN ASSISTANT

## 2023-10-18 PROCEDURE — 85520 HEPARIN ASSAY: CPT

## 2023-10-18 PROCEDURE — 3600000002 HC SURGERY LEVEL 2 BASE: Performed by: OTOLARYNGOLOGY

## 2023-10-18 PROCEDURE — 99291 CRITICAL CARE FIRST HOUR: CPT | Performed by: INTERNAL MEDICINE

## 2023-10-18 PROCEDURE — A4216 STERILE WATER/SALINE, 10 ML: HCPCS | Performed by: NURSE PRACTITIONER

## 2023-10-18 PROCEDURE — 2580000003 HC RX 258: Performed by: NURSE PRACTITIONER

## 2023-10-18 PROCEDURE — 3700000000 HC ANESTHESIA ATTENDED CARE: Performed by: OTOLARYNGOLOGY

## 2023-10-18 PROCEDURE — 2500000003 HC RX 250 WO HCPCS: Performed by: REGISTERED NURSE

## 2023-10-18 PROCEDURE — 84295 ASSAY OF SERUM SODIUM: CPT

## 2023-10-18 PROCEDURE — 94003 VENT MGMT INPAT SUBQ DAY: CPT

## 2023-10-18 PROCEDURE — 2580000003 HC RX 258: Performed by: EMERGENCY MEDICINE

## 2023-10-18 PROCEDURE — 6360000002 HC RX W HCPCS: Performed by: REGISTERED NURSE

## 2023-10-18 PROCEDURE — 2709999900 HC NON-CHARGEABLE SUPPLY: Performed by: OTOLARYNGOLOGY

## 2023-10-18 PROCEDURE — 2580000003 HC RX 258: Performed by: REGISTERED NURSE

## 2023-10-18 PROCEDURE — 2000000000 HC ICU R&B

## 2023-10-18 PROCEDURE — 36415 COLL VENOUS BLD VENIPUNCTURE: CPT

## 2023-10-18 PROCEDURE — 37799 UNLISTED PX VASCULAR SURGERY: CPT

## 2023-10-18 PROCEDURE — 3600000012 HC SURGERY LEVEL 2 ADDTL 15MIN: Performed by: OTOLARYNGOLOGY

## 2023-10-18 PROCEDURE — 89220 SPUTUM SPECIMEN COLLECTION: CPT

## 2023-10-18 PROCEDURE — 94761 N-INVAS EAR/PLS OXIMETRY MLT: CPT

## 2023-10-18 PROCEDURE — 0BJ08ZZ INSPECTION OF TRACHEOBRONCHIAL TREE, VIA NATURAL OR ARTIFICIAL OPENING ENDOSCOPIC: ICD-10-PCS | Performed by: OTOLARYNGOLOGY

## 2023-10-18 PROCEDURE — 6360000002 HC RX W HCPCS: Performed by: NURSE PRACTITIONER

## 2023-10-18 PROCEDURE — 2580000003 HC RX 258: Performed by: PHARMACIST

## 2023-10-18 PROCEDURE — 6360000002 HC RX W HCPCS: Performed by: PHARMACIST

## 2023-10-18 RX ORDER — SODIUM CHLORIDE 9 MG/ML
INJECTION, SOLUTION INTRAVENOUS CONTINUOUS PRN
Status: DISCONTINUED | OUTPATIENT
Start: 2023-10-18 | End: 2023-10-18 | Stop reason: SDUPTHER

## 2023-10-18 RX ORDER — MODAFINIL 100 MG/1
200 TABLET ORAL DAILY
Status: DISPENSED | OUTPATIENT
Start: 2023-10-18

## 2023-10-18 RX ORDER — 3% SODIUM CHLORIDE 3 G/100ML
15 INJECTION, SOLUTION INTRAVENOUS CONTINUOUS
Status: DISPENSED | OUTPATIENT
Start: 2023-10-18 | End: 2023-10-19

## 2023-10-18 RX ORDER — ROCURONIUM BROMIDE 10 MG/ML
INJECTION, SOLUTION INTRAVENOUS PRN
Status: DISCONTINUED | OUTPATIENT
Start: 2023-10-18 | End: 2023-10-18 | Stop reason: SDUPTHER

## 2023-10-18 RX ADMIN — POTASSIUM CHLORIDE 20 MEQ: 29.8 INJECTION, SOLUTION INTRAVENOUS at 00:45

## 2023-10-18 RX ADMIN — MODAFINIL 200 MG: 100 TABLET ORAL at 17:55

## 2023-10-18 RX ADMIN — VANCOMYCIN HYDROCHLORIDE 1000 MG: 1 INJECTION, POWDER, LYOPHILIZED, FOR SOLUTION INTRAVENOUS at 22:52

## 2023-10-18 RX ADMIN — SODIUM CHLORIDE, PRESERVATIVE FREE 20 MG: 5 INJECTION INTRAVENOUS at 08:50

## 2023-10-18 RX ADMIN — PHENYLEPHRINE HYDROCHLORIDE 200 MCG: 10 INJECTION INTRAVENOUS at 17:10

## 2023-10-18 RX ADMIN — PIPERACILLIN AND TAZOBACTAM 3375 MG: 3; .375 INJECTION, POWDER, LYOPHILIZED, FOR SOLUTION INTRAVENOUS at 01:44

## 2023-10-18 RX ADMIN — SODIUM CHLORIDE, PRESERVATIVE FREE 10 ML: 5 INJECTION INTRAVENOUS at 20:53

## 2023-10-18 RX ADMIN — CHLORHEXIDINE GLUCONATE 0.12% ORAL RINSE 15 ML: 1.2 LIQUID ORAL at 08:50

## 2023-10-18 RX ADMIN — POTASSIUM CHLORIDE 10 MEQ: 7.46 INJECTION, SOLUTION INTRAVENOUS at 02:57

## 2023-10-18 RX ADMIN — SODIUM CHLORIDE: 9 INJECTION, SOLUTION INTRAVENOUS at 17:00

## 2023-10-18 RX ADMIN — SODIUM CHLORIDE, PRESERVATIVE FREE 10 ML: 5 INJECTION INTRAVENOUS at 08:54

## 2023-10-18 RX ADMIN — POTASSIUM CHLORIDE 20 MEQ: 29.8 INJECTION, SOLUTION INTRAVENOUS at 00:00

## 2023-10-18 RX ADMIN — CHLORHEXIDINE GLUCONATE 0.12% ORAL RINSE 15 ML: 1.2 LIQUID ORAL at 20:54

## 2023-10-18 RX ADMIN — PROPOFOL 50 MG: 10 INJECTION, EMULSION INTRAVENOUS at 17:17

## 2023-10-18 RX ADMIN — LEVETIRACETAM 1000 MG: 100 INJECTION, SOLUTION INTRAVENOUS at 05:18

## 2023-10-18 RX ADMIN — PROPOFOL 50 MG: 10 INJECTION, EMULSION INTRAVENOUS at 17:30

## 2023-10-18 RX ADMIN — PIPERACILLIN AND TAZOBACTAM 3375 MG: 3; .375 INJECTION, POWDER, LYOPHILIZED, FOR SOLUTION INTRAVENOUS at 18:08

## 2023-10-18 RX ADMIN — SODIUM CHLORIDE, PRESERVATIVE FREE 20 MG: 5 INJECTION INTRAVENOUS at 20:53

## 2023-10-18 RX ADMIN — ROCURONIUM BROMIDE 50 MG: 10 INJECTION INTRAVENOUS at 17:08

## 2023-10-18 RX ADMIN — VANCOMYCIN HYDROCHLORIDE 1000 MG: 1 INJECTION, POWDER, LYOPHILIZED, FOR SOLUTION INTRAVENOUS at 11:41

## 2023-10-18 RX ADMIN — PIPERACILLIN AND TAZOBACTAM 3375 MG: 3; .375 INJECTION, POWDER, LYOPHILIZED, FOR SOLUTION INTRAVENOUS at 08:52

## 2023-10-18 RX ADMIN — PROPOFOL 50 MG: 10 INJECTION, EMULSION INTRAVENOUS at 17:23

## 2023-10-18 RX ADMIN — LEVETIRACETAM 1000 MG: 100 INJECTION, SOLUTION INTRAVENOUS at 18:10

## 2023-10-18 ASSESSMENT — PAIN SCALES - GENERAL: PAINLEVEL_OUTOF10: 0

## 2023-10-18 ASSESSMENT — PULMONARY FUNCTION TESTS
PIF_VALUE: 20
PIF_VALUE: 18
PIF_VALUE: 19
PIF_VALUE: 18
PIF_VALUE: 19

## 2023-10-18 ASSESSMENT — ENCOUNTER SYMPTOMS: SHORTNESS OF BREATH: 1

## 2023-10-18 NOTE — PROGRESS NOTES
Family remains at bedside.  updated on MRI results.   Explained to  that DR Yonis Johnson plans to repeat CT head and MRI of head tomorrow-  voices understanding

## 2023-10-18 NOTE — PLAN OF CARE
Problem: Respiratory - Adult  Goal: Achieves optimal ventilation and oxygenation  Outcome: Progressing  Flowsheets (Taken 10/17/2023 2059)  Achieves optimal ventilation and oxygenation:   Assess for changes in respiratory status   Position to facilitate oxygenation and minimize respiratory effort   Assess the need for suctioning and aspirate as needed   Respiratory therapy support as indicated   Assess for changes in mentation and behavior   Oxygen supplementation based on oxygen saturation or arterial blood gases   Assess and instruct to report shortness of breath or any respiratory difficulty  Note: Intubated/Vented. Wean as tolerated. SBT when appropriate.

## 2023-10-18 NOTE — H&P
Adapted from prior ENT note:     Failed extubation 10/16/23 with difficult re-intubation and cardiopulmonary arrest; intubated with #5 ETT; remains intubated on ventilator  POD 6 S/p cricotracheal resection    No new symptoms    Past Medical History:   Diagnosis Date    Arthritis     Coronary artery disease     COVID     Diabetic neuropathy ([address])     Bilateral feet numbness    DVT (deep venous thrombosis) ([address]) 2023    Right lower extremity    Glottic stenosis     3/2022    Hyperlipidemia     Lower back pain     With diagnosis of having \"stress fracture\" by ortho at [facility]    Primary hypertension     Pulmonary embolism ([address]) 2023    Right lower lobe    Type 2 diabetes mellitus ([address])        Past Surgical History:   Procedure Laterality Date    CARDIAC SURGERY      CATARACT REMOVAL Bilateral      SECTION      3 times    CORONARY ANGIOPLASTY WITH STENT PLACEMENT      stenting twice    EYE SURGERY      HIATAL HERNIA REPAIR      late     HIP SURGERY Right 2/3/2023    RIGHT FEMUR IM NAIL WITH INTERTAN performed by Jewell Dubin, MD at [address] [facility], [address] ([address])      in     LARYNGOSCOPY N/A 3/22/2022    SUSPENSON LARYNGOSCOPY WITH JET VENT, DILATION performed by Margi Castillo MD at [address] [place] [address] N/A 3/28/2022    SUSPENSION MICROLARYNGOSCOPY WITH BALLOON DILATION AND JET VENT, KENALOG INJECTION performed by Margi Castillo MD at [address] [place] [address] N/A 2022    Suspension Laryngoscopy  Lateral of Right True Vocal Cord, With Steroid Injection of Larynx And Tracheostomy Tube Change, debridement performed by Tess Parker MD at [address] [place] [address] N/A 8/10/2022    TRANSORAL LARYNGOPLASTY WITH LEFT PARTIAL ARYTENODECTOMY AND POSS LATERALIZATION, ADVANCEMENT FLAP RECONSTRUCTION WITH JET VENT,THERAPUTIC BRONCHOSCOPY WITH DEBRIEDMENT,WITH BALLOON DILITATION performed by Tess Parker MD at [address] Q6H PRN  HYDROmorphone (DILAUDID) injection 0.25 mg, 0.25 mg, IntraVENous, Q3H PRN **OR** HYDROmorphone (DILAUDID) injection 0.5 mg, 0.5 mg, IntraVENous, Q3H PRN  acetaminophen (TYLENOL) 160 MG/5ML solution 650 mg, 650 mg, Per NG tube, Q4H PRN  oxyCODONE (ROXICODONE) immediate release tablet 5 mg, 5 mg, Per NG tube, Q4H PRN **OR** oxyCODONE (ROXICODONE) immediate release tablet 10 mg, 10 mg, Per NG tube, Q4H PRN  chlorhexidine (PERIDEX) 0.12 % solution 15 mL, 15 mL, Mouth/Throat, BID  propofol infusion, 5-50 mcg/kg/min, IntraVENous, Continuous  piperacillin-tazobactam (ZOSYN) 3,375 mg in sodium chloride 0.9 % 50 mL IVPB (mini-bag), 3,375 mg, IntraVENous, Q8H  famotidine (PEPCID) 20 mg in sodium chloride (PF) 0.9 % 10 mL injection, 20 mg, IntraVENous, BID  vancomycin (VANCOCIN) intermittent dosing (placeholder), , Other, RX Placeholder  insulin lispro (HUMALOG) injection vial 0-8 Units, 0-8 Units, SubCUTAneous, Q4H  insulin lispro (HUMALOG) injection vial 0-4 Units, 0-4 Units, SubCUTAneous, Nightly  glucose chewable tablet 16 g, 4 tablet, Oral, PRN  dextrose bolus 10% 125 mL, 125 mL, IntraVENous, PRN **OR** dextrose bolus 10% 250 mL, 250 mL, IntraVENous, PRN  Glucagon Emergency KIT 1 mg, 1 mg, SubCUTAneous, PRN  dextrose 10 % infusion, , IntraVENous, Continuous PRN        ASSESSMENT AND PLAN     POD 5 cricotracheal resection, laryngoplasty with flap and vocal fold lateralization stitch placement     -Patient remains intubated in the ICU. EEG to be completed later this morning   -Continue Vancomycin and Zosyn at this time for surgical site infection prevention (finished Flagyl yesterday)  -Closely monitor bilateral RACHEL drains with LEFT drain deep mediastinal drain and RIGHT drain superficial in nature. Contact ENT on call if left deep mediastinal drain does not maintain/hold vacuum. Monitor and record output along with color/consistency.   -Continue with bolus feedings through NG with dietician recommendations. Recommend keeping NG in place upon extubation to allow for nutrition pending patient's progression.  -Recommend frequent oral suctioning with Yash Claire of 14 [place] suction catheter as patient with increase in clear oral secretions and no EVAC tube in place.   -Keep HOB elevated at 30 degrees with two pillows under head. Support head in semi-flexed position, especially with transfers.  -Dr Katherine Solitario plans to round this afternoon and perform beside ETT change from a #5 to #6.  Will need bougie introducer and glide scope to be available for this  -Tentatively plan for patient to return to the OR tomorrow with Dr Katherine Solitario for  suspension laryngoscopy with jet ventilation for revision of laryngoplasty     Electronically signed by MICHAEL Qureshi on 10/17/2023 at 9:15 AM

## 2023-10-18 NOTE — PROGRESS NOTES
Patient Weaning Progress    The patient's vent settings was not able to be weaned this shift. Ventilator settings that were weaned              [] Mode   [] Pressure support weaned   [] Fio2 weaned   [] Peep weaned      Spontaneous weaning trial  was not attempted. due to defined parameters for SBT (spontaneous breathing trial) not being met. Reason that defined ventilator parameters for SBT was not met              [] Patient condition requires increased ventilator settings  [] Requires increased sedation   [x] Settings not within weaning range   [] SAT not completed   [] Physician orders     Patient condition       Cuff was not  deflated to determine cuff leak. Unable to get agreement for goals because no family is present and patient cannot respond.

## 2023-10-18 NOTE — PROGRESS NOTES
Neurology Progress Note    Date:10/18/2023       Room:[facility]08/008-A  Patient Name:Kaleigh Landon     YOB: 1950     Age:72 y.o. Requesting Physician: Kim Khan MD     Reason for Consult:  Evaluate for Abnormal EEG      Chief Complaint: No chief complaint on file. Nadeen Ruffin is a 81 y.o. female with a history of coronary artery disease, hyperlipidemia, type 2 diabetes mellitus, provoked DVT/PE after hip surgery, HPDTN-05 infection complicated by 2 weeks of ventilator support and tracheostomy dependence who presents to [facility] on 10/12/2023 for ENT consultation for a segmental cricoid tracheal resection and removal of her tracheostomy. On 10/15/2023 she was placed on spontaneous breathing trial for 5 minutes and was able to tolerate this. On 10/16/2023 she failed her spontaneous breathing trial and had to go back on AC/PC due to going apneic in the morning but then later in the morning she tolerated SBT and was extubated around 1236 on 10/16/23 to BIPAP. After 2 minutes she stated she could not breathe and her oxygen saturation dropped to 53%. Her BiPAP settings were increased and primary team was made aware of need for reintubation. By the time the primary team arrived she was hypoxic and receiving CPR as she went into cardiac arrest secondary to respiratory arrest. Due to the complexity of her airway, it took multiple attempts to secure her airway with ETT. She has been off sedation since 0711 on 10/17/2023. EEG is showing no seizure activity and minimal brain activity. She reportedly had seizure-like activity after the code yesterday, but no further seizure activity noted. She currently is not following commands on exam.  She does withdraw/posture to painful stimulation. Interval history 10/18/23:  No acute events overnight. She is still withdrawing to pain. Not following commands on exam. She remains intubated and off sedation.   Review of

## 2023-10-18 NOTE — PLAN OF CARE
Problem: Discharge Planning  Goal: Discharge to home or other facility with appropriate resources  Outcome: Progressing  Flowsheets (Taken 10/17/2023 2000)  Discharge to home or other facility with appropriate resources: Identify barriers to discharge with patient and caregiver     Problem: Chronic Conditions and Co-morbidities  Goal: Patient's chronic conditions and co-morbidity symptoms are monitored and maintained or improved  Outcome: Progressing  Flowsheets (Taken 10/17/2023 2000)  Care Plan - Patient's Chronic Conditions and Co-Morbidity Symptoms are Monitored and Maintained or Improved: Monitor and assess patient's chronic conditions and comorbid symptoms for stability, deterioration, or improvement     Problem: Safety - Adult  Goal: Free from fall injury  Outcome: Progressing  Flowsheets (Taken 10/16/2023 2210)  Free From Fall Injury:   Instruct family/caregiver on patient safety   Based on caregiver fall risk screen, instruct family/caregiver to ask for assistance with transferring infant if caregiver noted to have fall risk factors     Problem: Pain  Goal: Verbalizes/displays adequate comfort level or baseline comfort level  Outcome: Progressing  Flowsheets  Taken 10/17/2023 2300  Verbalizes/displays adequate comfort level or baseline comfort level:   Encourage patient to monitor pain and request assistance   Assess pain using appropriate pain scale  Taken 10/17/2023 2000  Verbalizes/displays adequate comfort level or baseline comfort level:   Encourage patient to monitor pain and request assistance   Assess pain using appropriate pain scale     Problem: Respiratory - Adult  Goal: Achieves optimal ventilation and oxygenation  10/18/2023 0103 by India Saldivar RN  Outcome: Progressing  Flowsheets (Taken 10/17/2023 2059 by Elvia Polo RCP)  Achieves optimal ventilation and oxygenation:   Assess for changes in respiratory status   Position to facilitate oxygenation and minimize respiratory effort

## 2023-10-18 NOTE — ANESTHESIA PRE PROCEDURE
Department of Anesthesiology  Preprocedure Note       Name:  Denys Allan   Age:  81 y.o.  :  1950                                          MRN:  [id number]         Date:  10/18/2023      Surgeon: Mike Lee):  Bard Thania MD    Procedure: Procedure(s):  Suspension Laryngoscopy with ET Exchange    Medications prior to admission:   Prior to Admission medications    Medication Sig Start Date End Date Taking? Authorizing Provider   ciprofloxacin (CIPRO) 750 MG tablet Take 1 tablet by mouth 2 times daily for 10 days 10/9/23 10/19/23  Bard Thania MD   acetaminophen (TYLENOL) 500 MG tablet Take 1 tablet by mouth every 6 hours as needed for Pain    Arnoldo Ferrer MD   CALCIUM GLUCONATE PO Take 600 mg by mouth 2 times daily    Arnoldo Ferrer MD   SENNA CO by Combination route  Patient not taking: Reported on 10/9/2023    Arnoldo Ferrer MD   lipase-protease-amylase (CREON) 39248-57786 units delayed release capsule Take 1 capsule by mouth 3 times daily (with meals)    Arnoldo Ferrer MD   tiZANidine (ZANAFLEX) 4 MG tablet  23   Arnoldo Ferrer MD   diclofenac sodium (VOLTAREN) 1 % GEL Apply 2 g topically 4 times daily as needed for Pain  Patient not taking: Reported on 10/9/2023 3/2/23   Isma Carolina MD   sucralfate (CARAFATE) 1 GM tablet Take 1 tablet by mouth 4 times daily (before meals and nightly)  Patient not taking: Reported on 10/9/2023 3/2/23   Isma Carolina MD   insulin lispro, 1 Unit Dial, ([facility]) 100 UNIT/ML SOPN Inject 20-25 Units into the skin in the morning and 20-25 Units at noon and 20-25 Units in the evening. Inject before meals.  22  Quentin Barrera DO   Cholecalciferol (VITAMIN D3 PO) Take 50,000 Units by mouth every 30 days 4000 units  Patient not taking: Reported on 10/9/2023    Arnoldo Ferrer MD   insulin NPH (HUMULIN N;NOVOLIN N) 100 UNIT/ML injection vial Inject into the skin 2 times daily (before

## 2023-10-18 NOTE — OP NOTE
Last refill of citalopram 10 mg 14 tablets 0 refills 11/29/22  Upcoming office visit with Dani on 12/14/22 and Virk on 12/14/22  Last office visit with Dani on 11/29/22    Refill per protocol     SSRI (Selective Serotonin Reuptake Inhibitors) Adult Refill Protocol - 12 Month Protocol Passed 12/09/2022 10:39 AM   Protocol Details  Seen by prescribing provider or same department within the last 12 months or has a future appt in 3 months - IF FAILED PLEASE LOOK AT CHART REVIEW FOR LAST VISIT AND PROCEED ACCORDINGLY    Medication (including dose and sig) on current meds list        Operative Note      Patient: Deja Hunter  YOB: 1950  MRN: [id number]    Date of Procedure: 10/18/2023    Pre-Op Diagnosis Codes:     * Tracheal stenosis following tracheostomy ([address]) [J95.03]  Failed extubation  Anoxic brain injury    Post-Op Diagnosis: Same       Procedure(s):  ET Exchange bronhoscopy and lavage    Surgeon(s):  Es Lee MD    Assistant:   * No surgical staff found *    Anesthesia: Monitor Anesthesia Care    Estimated Blood Loss (mL): Minimal    Complications: None    Specimens:   * No specimens in log *    Implants:  * No implants in log *      Drains:   Closed/Suction Drain Left; Anterior Neck Bulb (Active)   Site Description Clean, dry & intact 10/16/23 0800   Dressing Status Old drainage noted 10/18/23 0838   Drainage Appearance Bloody 10/18/23 0838   Drain Status Compressed 10/18/23 0838   Output (ml) 5 ml 10/18/23 0400       Closed/Suction Drain Right; Anterior Neck Bulb (Active)   Site Description Clean, dry & intact 10/16/23 0800   Dressing Status Old drainage noted 10/18/23 0838   Drainage Appearance Bloody; Clots 10/18/23 0838   Drain Status Compressed 10/18/23 0838   Output (ml) 5 ml 10/18/23 0400       NG/OG/NJ/NE Tube Nasogastric Left nostril (Active)   Surrounding Skin Clean, dry & intact 10/18/23 0838   Securement device Bridle 10/18/23 0838   Status Clamped 10/18/23 0838   Placement Verified External Catheter Length;Gastric Contents 10/18/23 0838   Drainage Appearance Green 10/18/23 0838   Tube Feeding Other Tube Feeding (specify) 10/16/23 2004   Tube Feeding Intake (mL) 150 ml 10/18/23 1900   Free Water/Flush (mL) 0 mL 10/18/23 0400   Output (mL) 0 ml 10/18/23 0400   Residual Volume (ml) 0 ml 10/18/23 0400       Urinary Catheter 10/12/23 Wen-Temperature (Active)   Catheter Indications Need for fluid volume management of the critically ill patient in a critical care setting 10/18/23 0838   Site Assessment Pink 10/18/23 0838   Urine Color Kimberly 10/18/23 6215 bougie introducer through the lumen of the #5 endotracheal tube including the connector at the end of the tube which made the passage challenging. Nonetheless I could see from the laryngoscope that the bougie made it all the way into the trachea prior to removing the endotracheal tube approximately. I followed this with the placement of the #7 cuffed endotracheal tube was cuff was stripped back and sucked down of all air and the tip was lubricated with the balloon being spiral he drawn back. This made for the smallest OD possible for this much larger endotracheal tube. I engage the glottis and felt some resistance but was able to pass through the glottis without obvious trauma to the Endo glottic tissues and entered the subglottis and trachea without difficulty. I remove the bougie introducer and commence ventilation still looking at the larynx to make sure that all was okay before removing it. Adequate ventilation was confirmed. Bronchoalveolar lavage: With the endotracheal tube in good position I passed a glide scope video bronchoscope through a sideport attachment to commence cleaning the airway of the expected bloody effluent. I also wanted to verify the position of the end of the ET tube above the telma. The tube was at this point at 21 cm from the lips. And migrated down to 23 cm as I passed the bronchoscope into position finding the ET tube in the right mainstem. I began to adjust the endotracheal tube without enough blood in the right mainstem that I opted to cleanse the site of the airway prior to proceeding with a complete endoscopic repositioning. This included the instillation of sterile saline into each lobar bronchus in the cleansing of the segmental bronchi. After repositioning the tube above the telma at 22 cm I cleansed the right mainstem and the lobar bronchi with saline as well. No samples were taken.   Once both sides were clean I remove the bronchoscope and secured the

## 2023-10-19 ENCOUNTER — APPOINTMENT (OUTPATIENT)
Dept: MRI IMAGING | Age: 73
DRG: 163 | End: 2023-10-19
Attending: OTOLARYNGOLOGY
Payer: MEDICARE

## 2023-10-19 PROBLEM — G93.40 ENCEPHALOPATHY: Status: ACTIVE | Noted: 2023-10-19

## 2023-10-19 LAB
ANION GAP SERPL CALC-SCNC: 9 MEQ/L (ref 8–16)
BUN SERPL-MCNC: 15 MG/DL (ref 7–22)
CA-I BLD ISE-SCNC: 1.18 MMOL/L (ref 1.12–1.32)
CALCIUM SERPL-MCNC: 8.2 MG/DL (ref 8.5–10.5)
CHLORIDE SERPL-SCNC: 116 MEQ/L (ref 98–111)
CO2 SERPL-SCNC: 21 MEQ/L (ref 23–33)
CREAT SERPL-MCNC: 0.7 MG/DL (ref 0.4–1.2)
DEPRECATED RDW RBC AUTO: 48.3 FL (ref 35–45)
ERYTHROCYTE [DISTWIDTH] IN BLOOD BY AUTOMATED COUNT: 14.6 % (ref 11.5–14.5)
GFR SERPL CREATININE-BSD FRML MDRD: > 60 ML/MIN/1.73M2
GLUCOSE BLD STRIP.AUTO-MCNC: 226 MG/DL (ref 70–108)
GLUCOSE BLD STRIP.AUTO-MCNC: 258 MG/DL (ref 70–108)
GLUCOSE BLD STRIP.AUTO-MCNC: 264 MG/DL (ref 70–108)
GLUCOSE BLD STRIP.AUTO-MCNC: 264 MG/DL (ref 70–108)
GLUCOSE BLD STRIP.AUTO-MCNC: 265 MG/DL (ref 70–108)
GLUCOSE BLD STRIP.AUTO-MCNC: 99 MG/DL (ref 70–108)
GLUCOSE SERPL-MCNC: 247 MG/DL (ref 70–108)
HCT VFR BLD AUTO: 27.8 % (ref 37–47)
HGB BLD-MCNC: 8.8 GM/DL (ref 12–16)
MAGNESIUM SERPL-MCNC: 1.9 MG/DL (ref 1.6–2.4)
MCH RBC QN AUTO: 29.1 PG (ref 26–33)
MCHC RBC AUTO-ENTMCNC: 31.7 GM/DL (ref 32.2–35.5)
MCV RBC AUTO: 92.1 FL (ref 81–99)
PLATELET # BLD AUTO: 201 THOU/MM3 (ref 130–400)
PMV BLD AUTO: 10.2 FL (ref 9.4–12.4)
POTASSIUM SERPL-SCNC: 3.5 MEQ/L (ref 3.5–5.2)
RBC # BLD AUTO: 3.02 MILL/MM3 (ref 4.2–5.4)
SODIUM SERPL-SCNC: 146 MEQ/L (ref 135–145)
SODIUM SERPL-SCNC: 147 MEQ/L (ref 135–145)
SODIUM SERPL-SCNC: 147 MEQ/L (ref 135–145)
WBC # BLD AUTO: 12.7 THOU/MM3 (ref 4.8–10.8)

## 2023-10-19 PROCEDURE — 95711 VEEG 2-12 HR UNMONITORED: CPT

## 2023-10-19 PROCEDURE — 99024 POSTOP FOLLOW-UP VISIT: CPT | Performed by: PHYSICIAN ASSISTANT

## 2023-10-19 PROCEDURE — 2580000003 HC RX 258: Performed by: NURSE PRACTITIONER

## 2023-10-19 PROCEDURE — 6370000000 HC RX 637 (ALT 250 FOR IP): Performed by: NURSE PRACTITIONER

## 2023-10-19 PROCEDURE — 6360000002 HC RX W HCPCS: Performed by: EMERGENCY MEDICINE

## 2023-10-19 PROCEDURE — 82330 ASSAY OF CALCIUM: CPT

## 2023-10-19 PROCEDURE — 99291 CRITICAL CARE FIRST HOUR: CPT | Performed by: INTERNAL MEDICINE

## 2023-10-19 PROCEDURE — 85027 COMPLETE CBC AUTOMATED: CPT

## 2023-10-19 PROCEDURE — 80048 BASIC METABOLIC PNL TOTAL CA: CPT

## 2023-10-19 PROCEDURE — 6370000000 HC RX 637 (ALT 250 FOR IP): Performed by: EMERGENCY MEDICINE

## 2023-10-19 PROCEDURE — 95718 EEG PHYS/QHP 2-12 HR W/VEEG: CPT | Performed by: PSYCHIATRY & NEUROLOGY

## 2023-10-19 PROCEDURE — 82948 REAGENT STRIP/BLOOD GLUCOSE: CPT

## 2023-10-19 PROCEDURE — 2700000000 HC OXYGEN THERAPY PER DAY

## 2023-10-19 PROCEDURE — 6370000000 HC RX 637 (ALT 250 FOR IP): Performed by: INTERNAL MEDICINE

## 2023-10-19 PROCEDURE — 6360000002 HC RX W HCPCS: Performed by: NURSE PRACTITIONER

## 2023-10-19 PROCEDURE — 95819 EEG AWAKE AND ASLEEP: CPT

## 2023-10-19 PROCEDURE — A4216 STERILE WATER/SALINE, 10 ML: HCPCS | Performed by: NURSE PRACTITIONER

## 2023-10-19 PROCEDURE — 2500000003 HC RX 250 WO HCPCS: Performed by: NURSE PRACTITIONER

## 2023-10-19 PROCEDURE — 6370000000 HC RX 637 (ALT 250 FOR IP): Performed by: STUDENT IN AN ORGANIZED HEALTH CARE EDUCATION/TRAINING PROGRAM

## 2023-10-19 PROCEDURE — 2580000003 HC RX 258: Performed by: PHARMACIST

## 2023-10-19 PROCEDURE — 84295 ASSAY OF SERUM SODIUM: CPT

## 2023-10-19 PROCEDURE — 6360000002 HC RX W HCPCS: Performed by: PHARMACIST

## 2023-10-19 PROCEDURE — 2580000003 HC RX 258: Performed by: INTERNAL MEDICINE

## 2023-10-19 PROCEDURE — 70551 MRI BRAIN STEM W/O DYE: CPT

## 2023-10-19 PROCEDURE — 83735 ASSAY OF MAGNESIUM: CPT

## 2023-10-19 PROCEDURE — 94003 VENT MGMT INPAT SUBQ DAY: CPT

## 2023-10-19 PROCEDURE — 99232 SBSQ HOSP IP/OBS MODERATE 35: CPT | Performed by: PHYSICIAN ASSISTANT

## 2023-10-19 PROCEDURE — 2580000003 HC RX 258: Performed by: EMERGENCY MEDICINE

## 2023-10-19 PROCEDURE — 2000000000 HC ICU R&B

## 2023-10-19 PROCEDURE — 2500000003 HC RX 250 WO HCPCS: Performed by: EMERGENCY MEDICINE

## 2023-10-19 PROCEDURE — 94761 N-INVAS EAR/PLS OXIMETRY MLT: CPT

## 2023-10-19 PROCEDURE — 36415 COLL VENOUS BLD VENIPUNCTURE: CPT

## 2023-10-19 RX ORDER — ENOXAPARIN SODIUM 100 MG/ML
1 INJECTION SUBCUTANEOUS 2 TIMES DAILY
Status: DISCONTINUED | OUTPATIENT
Start: 2023-10-19 | End: 2023-10-23

## 2023-10-19 RX ORDER — BUMETANIDE 0.25 MG/ML
0.5 INJECTION INTRAMUSCULAR; INTRAVENOUS ONCE
Status: COMPLETED | OUTPATIENT
Start: 2023-10-19 | End: 2023-10-19

## 2023-10-19 RX ORDER — INSULIN LISPRO 100 [IU]/ML
0-16 INJECTION, SOLUTION INTRAVENOUS; SUBCUTANEOUS EVERY 4 HOURS
Status: DISCONTINUED | OUTPATIENT
Start: 2023-10-19 | End: 2023-10-20

## 2023-10-19 RX ORDER — INSULIN LISPRO 100 [IU]/ML
0-4 INJECTION, SOLUTION INTRAVENOUS; SUBCUTANEOUS NIGHTLY
Status: DISCONTINUED | OUTPATIENT
Start: 2023-10-19 | End: 2023-10-19

## 2023-10-19 RX ORDER — INSULIN LISPRO 100 [IU]/ML
0-16 INJECTION, SOLUTION INTRAVENOUS; SUBCUTANEOUS
Status: DISCONTINUED | OUTPATIENT
Start: 2023-10-19 | End: 2023-10-19

## 2023-10-19 RX ORDER — ENOXAPARIN SODIUM 100 MG/ML
40 INJECTION SUBCUTANEOUS ONCE
Status: COMPLETED | OUTPATIENT
Start: 2023-10-19 | End: 2023-10-19

## 2023-10-19 RX ORDER — ENOXAPARIN SODIUM 100 MG/ML
40 INJECTION SUBCUTANEOUS DAILY
Status: DISCONTINUED | OUTPATIENT
Start: 2023-10-19 | End: 2023-10-19

## 2023-10-19 RX ORDER — FAMOTIDINE 20 MG/1
20 TABLET, FILM COATED ORAL 2 TIMES DAILY
Status: DISPENSED | OUTPATIENT
Start: 2023-10-19

## 2023-10-19 RX ADMIN — VANCOMYCIN HYDROCHLORIDE 1000 MG: 1 INJECTION, POWDER, LYOPHILIZED, FOR SOLUTION INTRAVENOUS at 23:18

## 2023-10-19 RX ADMIN — FAMOTIDINE 20 MG: 20 TABLET ORAL at 20:43

## 2023-10-19 RX ADMIN — VANCOMYCIN HYDROCHLORIDE 1000 MG: 1 INJECTION, POWDER, LYOPHILIZED, FOR SOLUTION INTRAVENOUS at 13:36

## 2023-10-19 RX ADMIN — INSULIN LISPRO 8 UNITS: 100 INJECTION, SOLUTION INTRAVENOUS; SUBCUTANEOUS at 21:00

## 2023-10-19 RX ADMIN — PIPERACILLIN AND TAZOBACTAM 3375 MG: 3; .375 INJECTION, POWDER, LYOPHILIZED, FOR SOLUTION INTRAVENOUS at 17:55

## 2023-10-19 RX ADMIN — SODIUM CHLORIDE, PRESERVATIVE FREE 10 ML: 5 INJECTION INTRAVENOUS at 12:23

## 2023-10-19 RX ADMIN — INSULIN LISPRO 8 UNITS: 100 INJECTION, SOLUTION INTRAVENOUS; SUBCUTANEOUS at 17:00

## 2023-10-19 RX ADMIN — INSULIN LISPRO 4 UNITS: 100 INJECTION, SOLUTION INTRAVENOUS; SUBCUTANEOUS at 12:28

## 2023-10-19 RX ADMIN — ENOXAPARIN SODIUM 40 MG: 100 INJECTION SUBCUTANEOUS at 09:34

## 2023-10-19 RX ADMIN — BUMETANIDE 0.5 MG: 0.25 INJECTION INTRAMUSCULAR; INTRAVENOUS at 09:30

## 2023-10-19 RX ADMIN — SODIUM CHLORIDE, PRESERVATIVE FREE 10 ML: 5 INJECTION INTRAVENOUS at 20:43

## 2023-10-19 RX ADMIN — SODIUM CHLORIDE, PRESERVATIVE FREE 20 MG: 5 INJECTION INTRAVENOUS at 09:36

## 2023-10-19 RX ADMIN — PIPERACILLIN AND TAZOBACTAM 3375 MG: 3; .375 INJECTION, POWDER, LYOPHILIZED, FOR SOLUTION INTRAVENOUS at 10:06

## 2023-10-19 RX ADMIN — CHLORHEXIDINE GLUCONATE 0.12% ORAL RINSE 15 ML: 1.2 LIQUID ORAL at 20:43

## 2023-10-19 RX ADMIN — ENOXAPARIN SODIUM 40 MG: 100 INJECTION SUBCUTANEOUS at 16:42

## 2023-10-19 RX ADMIN — MODAFINIL 200 MG: 100 TABLET ORAL at 09:36

## 2023-10-19 RX ADMIN — LEVETIRACETAM 1000 MG: 100 INJECTION, SOLUTION INTRAVENOUS at 17:20

## 2023-10-19 RX ADMIN — LEVETIRACETAM 1000 MG: 100 INJECTION, SOLUTION INTRAVENOUS at 05:59

## 2023-10-19 RX ADMIN — CHLORHEXIDINE GLUCONATE 0.12% ORAL RINSE 15 ML: 1.2 LIQUID ORAL at 08:36

## 2023-10-19 RX ADMIN — SODIUM CHLORIDE 15 ML/HR: 3 INJECTION, SOLUTION INTRAVENOUS at 04:14

## 2023-10-19 RX ADMIN — PIPERACILLIN AND TAZOBACTAM 3375 MG: 3; .375 INJECTION, POWDER, LYOPHILIZED, FOR SOLUTION INTRAVENOUS at 02:09

## 2023-10-19 RX ADMIN — ENOXAPARIN SODIUM 80 MG: 100 INJECTION SUBCUTANEOUS at 20:43

## 2023-10-19 RX ADMIN — INSULIN LISPRO 2 UNITS: 100 INJECTION, SOLUTION INTRAVENOUS; SUBCUTANEOUS at 02:06

## 2023-10-19 ASSESSMENT — PULMONARY FUNCTION TESTS
PIF_VALUE: 19

## 2023-10-19 NOTE — PROGRESS NOTES
Neurology Progress Note    Date:10/19/2023       Room:[facility]08/008-A  Patient Name:Kaleigh Zuluaga     YOB: 1950     Age:72 y.o. Chief Complaint: tracheal stenosis      Subjective     Nakia Fraser is a 81 y.o. female with a history of coronary artery disease, hyperlipidemia, type 2 diabetes mellitus, provoked DVT/PE after hip surgery, IUDRT-05 infection complicated by 2 weeks of ventilator support and tracheostomy dependence who presents to [address] on 10/12/2023 for ENT consultation for a segmental cricoid tracheal resection and removal of her tracheostomy. On 10/15/2023 she was placed on spontaneous breathing trial for 5 minutes and was able to tolerate this. On 10/16/2023 she failed her spontaneous breathing trial and had to go back on AC/PC due to going apneic in the morning but then later in the morning she tolerated SBT and was extubated around 1236 on 10/16/23 to BIPAP. After 2 minutes she stated she could not breathe and her oxygen saturation dropped to 53%. Her BiPAP settings were increased and primary team was made aware of need for reintubation. By the time the primary team arrived she was hypoxic and receiving CPR as she went into cardiac arrest secondary to respiratory arrest. Due to the complexity of her airway, it took multiple attempts to secure her airway with ETT. She has been off sedation since 0711 on 10/17/2023. EEG is showing no seizure activity and minimal brain activity. She reportedly had seizure-like activity after the code yesterday, but no further seizure activity noted. She currently is not following commands on exam.  She does withdraw/posture to painful stimulation. Interval history 10/18/23:  No acute events overnight. She is still withdrawing to pain. Not following commands on exam. She remains intubated and off sedation. Interval history 10/19/23: The patient remains intubated. Per nursing staff, she was noted to have a cough and a gag.

## 2023-10-19 NOTE — PROGRESS NOTES
[address] [facility] [address] [facility] Adult Intravenous to Oral Protocol    Famotidine changed to PO per [address] [facility] P&T IV to PO protocol. Patient meets criteria based on the following:   Tolerating diet more advanced than clear liquids: Yes- tube feeds  Tolerating other oral medications: Yes - down NG tube  Not on vasopressors: Yes  No nausea/vomiting within past 24 hours: Yes  No active GI bleed:  Yes  No gastrectomy, gastric outlet or bowel obstruction, ileus, malabsorption syndrome: Yes    Thank you,  Zari Rodriguez, PharmD  PGY1 Resident

## 2023-10-19 NOTE — PROCEDURES
EEG REPORT       Patient: Danis Pierson Age: 81 y.o. MRN: [id number]      Referring Provider: No ref. provider found    History: This routine 4 hour 40 minute scalp EEG was recorded with video- monitoring for a 81 y.o. [facility] female  who had a cardiac arrest and subsequent jerks concerning for seizures. This long-term video-EEG monitoring study was performed to evaluate for seizures. This EEG was performed to evaluate for focal and epileptiform abnormalities. Study interrupted between 11:50-14:46 for MRI. Total recording time 4 hour 40 mins.     [place]   Current Facility-Administered Medications   Medication Dose Route Frequency Provider Last Rate Last Admin    famotidine (PEPCID) tablet 20 mg  20 mg Oral BID Ambika Cade DO        enoxaparin (LOVENOX) injection 80 mg  1 mg/kg SubCUTAneous BID Ambika Cade DO        insulin lispro (HUMALOG) injection vial 0-16 Units  0-16 Units SubCUTAneous Q4H Baron Jules MD   8 Units at 10/19/23 1700    modafinil (PROVIGIL) tablet 200 mg  200 mg Oral Daily WYATT Staton CNP   200 mg at 10/19/23 0936    3% sodium chloride (HYPERTONIC) IV infusion  15 mL/hr IntraVENous Continuous Baron Jules MD 15 mL/hr at 10/19/23 0911 15 mL/hr at 10/19/23 0911    scopolamine (TRANSDERM-SCOP) transdermal patch 1 patch  1 patch TransDERmal Q72H WYATT Salazar - CNP   1 patch at 10/18/23 0232    levETIRAcetam (KEPPRA) 1,000 mg in sodium chloride 0.9 % 100 mL IVPB  1,000 mg IntraVENous Q12H Ambika Cade DO   Stopped at 10/19/23 1735    LORazepam (ATIVAN) injection 4 mg  4 mg IntraVENous Q5 Min MARYN Jaimee Victor APRN - CNP        vancomycin (VANCOCIN) 1,000 mg in sodium chloride 0.9 % 250 mL IVPB ([id number])  1,000 mg IntraVENous Q12H Seema Trevino, [address] [place] [address]   Stopped at 10/19/23 1439    sodium chloride flush 0.9 % injection 5-40 mL  5-40 mL IntraVENous 2 times per day Zhanna Muniz APRN - CNP   10 mL at 10/19/23 1223    sodium chloride flush 0.9 % injection

## 2023-10-19 NOTE — PROGRESS NOTES
[address] [facility] Laboratory Technician Worksheet      EEG Date: 10/19/2023    Name: Jason Moreno   : 1950   Age: 81 y.o. SEX: female    ROOM: 12 MRN: [id number]           CSN: [id number]      Ordering Provider: Hakan Jones  EEG Number: [id number] Time of Test:1030  reapplied after MRI at 1446                              4 HOUR eeg  Hand: unknown   Sedation: no    H.V. Done: No  vent, age protocol  Photic: No    Sleep: No  Drowsy: No   Sleep Deprived: No    Seizures observed: no    Mentality: unresponsive      Clinical History:anoxic brain injury, decorticate posturing ble, no command follow,  hx parkinsons  Mri in process,  Ct  IMPRESSION:   No evidence of an acute process.      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    Hyperlipidemia     Lower back pain     With diagnosis of having \"stress fracture\" by ortho at [facility]    Primary hypertension     Pulmonary embolism ([address]) 2023    Right lower lobe    Type 2 diabetes mellitus ([address])        Scheduled Meds:   enoxaparin  40 mg SubCUTAneous Daily    famotidine  20 mg Oral BID    modafinil  200 mg Oral Daily    scopolamine  1 patch TransDERmal Q72H    levETIRAcetam  1,000 mg IntraVENous Q12H    vancomycin  1,000 mg IntraVENous Q12H    sodium chloride flush  5-40 mL IntraVENous 2 times per day    chlorhexidine  15 mL Mouth/Throat BID    piperacillin-tazobactam  3,375 mg IntraVENous Q8H    vancomycin (VANCOCIN) intermittent dosing (placeholder)   Other RX Placeholder    insulin lispro  0-8 Units SubCUTAneous Q4H    insulin lispro  0-4 Units SubCUTAneous Nightly     Continuous Infusions:   3% sodium chloride 15 mL/hr (10/19/23 0911)    sodium chloride Stopped (10/17/23 1042)    propofol Stopped (10/17/23

## 2023-10-19 NOTE — PLAN OF CARE
Problem: Discharge Planning  Goal: Discharge to home or other facility with appropriate resources  10/19/2023 1242 by Sergio Peguero RN  Outcome: Progressing  Flowsheets (Taken 10/19/2023 1242)  Discharge to home or other facility with appropriate resources:   Identify barriers to discharge with patient and caregiver   Identify discharge learning needs (meds, wound care, etc)     Problem: Chronic Conditions and Co-morbidities  Goal: Patient's chronic conditions and co-morbidity symptoms are monitored and maintained or improved  10/19/2023 1242 by Sergio Peguero RN  Outcome: Progressing  Flowsheets (Taken 10/19/2023 1242)  Care Plan - Patient's Chronic Conditions and Co-Morbidity Symptoms are Monitored and Maintained or Improved:   Monitor and assess patient's chronic conditions and comorbid symptoms for stability, deterioration, or improvement   Collaborate with multidisciplinary team to address chronic and comorbid conditions and prevent exacerbation or deterioration     Problem: Safety - Adult  Goal: Free from fall injury  10/19/2023 1242 by Sergio Peguero RN  Outcome: Progressing  Flowsheets (Taken 10/19/2023 1242)  Free From Fall Injury: Instruct family/caregiver on patient safety     Problem: Pain  Goal: Verbalizes/displays adequate comfort level or baseline comfort level  10/19/2023 1242 by Sergio Peguero RN  Outcome: Progressing  Flowsheets (Taken 10/19/2023 1242)  Verbalizes/displays adequate comfort level or baseline comfort level:   Encourage patient to monitor pain and request assistance   Administer analgesics based on type and severity of pain and evaluate response   Assess pain using appropriate pain scale     Problem: Respiratory - Adult  Goal: Achieves optimal ventilation and oxygenation  10/19/2023 1242 by Sergio Peguero RN  Outcome: Progressing  Flowsheets (Taken 10/19/2023 1242)  Achieves optimal ventilation and oxygenation:   Assess for changes in respiratory status   Respiratory therapy

## 2023-10-19 NOTE — PLAN OF CARE
Problem: Respiratory - Adult  Goal: Normal spontaneous ventilation  Outcome: Progressing     Problem: Respiratory - Adult  Goal: Achieves optimal ventilation and oxygenation  10/19/2023 0500 by Marquis Joyce RCP  Outcome: Progressing                                                Patient Weaning Progress    The patient's vent settings was able to be weaned this shift. Ventilator settings that were weaned              [x] Mode   [] Pressure support weaned   [] Fio2 weaned   [] Peep weaned      Spontaneous weaning trial  was attempted. due to defined parameters for SBT (spontaneous breathing trial) not being met. The patient was able to tolerate SBT. Evac tube was not  hooked up with continuous low suction(20-30mmHg)  ETT does not have suction available. Unable to get agreement for goals because no family is present and patient cannot respond.

## 2023-10-19 NOTE — PLAN OF CARE
Problem: Discharge Planning  Goal: Discharge to home or other facility with appropriate resources  Outcome: Progressing  Flowsheets (Taken 10/18/2023 2350)  Discharge to home or other facility with appropriate resources: Identify barriers to discharge with patient and caregiver     Problem: Chronic Conditions and Co-morbidities  Goal: Patient's chronic conditions and co-morbidity symptoms are monitored and maintained or improved  Outcome: Progressing  Flowsheets (Taken 10/18/2023 2350)  Care Plan - Patient's Chronic Conditions and Co-Morbidity Symptoms are Monitored and Maintained or Improved: Monitor and assess patient's chronic conditions and comorbid symptoms for stability, deterioration, or improvement     Problem: Safety - Adult  Goal: Free from fall injury  Outcome: Progressing  Flowsheets (Taken 10/18/2023 2350)  Free From Fall Injury: Instruct family/caregiver on patient safety     Problem: Pain  Goal: Verbalizes/displays adequate comfort level or baseline comfort level  Outcome: Progressing  Flowsheets (Taken 10/18/2023 2350)  Verbalizes/displays adequate comfort level or baseline comfort level: Encourage patient to monitor pain and request assistance     Problem: Respiratory - Adult  Goal: Achieves optimal ventilation and oxygenation  Outcome: Progressing  Flowsheets (Taken 10/18/2023 2350)  Achieves optimal ventilation and oxygenation: Assess for changes in respiratory status     Problem: Neurosensory - Adult  Goal: Achieves stable or improved neurological status  Outcome: Progressing  Flowsheets (Taken 10/18/2023 2350)  Achieves stable or improved neurological status: Assess for and report changes in neurological status  Goal: Absence of seizures  Outcome: Progressing     Problem: Skin/Tissue Integrity - Adult  Goal: Incisions, wounds, or drain sites healing without S/S of infection  Outcome: Progressing  Flowsheets (Taken 10/18/2023 2350)  Incisions, Wounds, or Drain Sites Healing Without Sign and

## 2023-10-19 NOTE — ANESTHESIA POSTPROCEDURE EVALUATION
Department of Anesthesiology  Postprocedure Note    Patient: Royal Baez  MRN: [id number]  YOB: 1950  Date of evaluation: 10/19/2023      Procedure Summary     Date: 10/18/23 Room / Location: [facility] 08 / [facility]    Anesthesia Start: 1700 Anesthesia Stop: 4066    Procedure: ET Exchange bronhoscopy and lavage Diagnosis:       Tracheal stenosis following tracheostomy ([address])      (Tracheal stenosis following tracheostomy ([address]) [J95.03])    Surgeons: Melquiades Childs MD Responsible Provider: Marisa Stein DO    Anesthesia Type: general ASA Status: 4          Anesthesia Type: No value filed.     Patricia Phase I: Patricia Score: 10    Patricia Phase II:        Anesthesia Post Evaluation    Patient location during evaluation: ICU  Patient participation: complete - patient cannot participate  Level of consciousness: sedated and ventilated  Pain score: 0  Airway patency: patent  Nausea & Vomiting: no vomiting and no nausea  Complications: no  Cardiovascular status: hemodynamically stable  Respiratory status: ventilator and acceptable  Hydration status: stable  Multimodal analgesia pain management approach  Pain management: adequate

## 2023-10-19 NOTE — PLAN OF CARE
Problem: Respiratory - Adult  Goal: Normal spontaneous ventilation  10/19/2023 1820 by Camelia Quiroga RCP  Outcome: Progressing     Problem: Respiratory - Adult  Goal: Achieves optimal ventilation and oxygenation  10/19/2023 1820 by Camelia Quiroga RCP  Outcome: Progressing                                                  Patient Weaning Progress    The patient's vent settings were able to be weaned this shift. Ventilator settings that were weaned              [] Mode   [x] Pressure support weaned   [x] Fio2 weaned   [] Peep weaned      Spontaneous weaning trial  was attempted. The patient was able to tolerate SBT. EtCO2 of 29 and SpO2 of 92 on 25% FiO2. The patient was on SBT for 14 hours. Patient does not have an evac tube. Cuff was not  deflated to determine cuff leak. Unable to get agreement for goals because no family is present and patient cannot respond.

## 2023-10-20 ENCOUNTER — APPOINTMENT (OUTPATIENT)
Dept: GENERAL RADIOLOGY | Age: 73
DRG: 163 | End: 2023-10-20
Attending: OTOLARYNGOLOGY
Payer: MEDICARE

## 2023-10-20 PROBLEM — R06.00 DYSPNEA AND RESPIRATORY ABNORMALITIES: Status: ACTIVE | Noted: 2023-10-20

## 2023-10-20 PROBLEM — R06.89 DYSPNEA AND RESPIRATORY ABNORMALITIES: Status: ACTIVE | Noted: 2023-10-20

## 2023-10-20 LAB
ALBUMIN SERPL BCG-MCNC: 3.1 G/DL (ref 3.5–5.1)
ALP SERPL-CCNC: 122 U/L (ref 38–126)
ALT SERPL W/O P-5'-P-CCNC: 20 U/L (ref 11–66)
ANION GAP SERPL CALC-SCNC: 10 MEQ/L (ref 8–16)
ANION GAP SERPL CALC-SCNC: 10 MEQ/L (ref 8–16)
ANION GAP SERPL CALC-SCNC: 11 MEQ/L (ref 8–16)
ANION GAP SERPL CALC-SCNC: 12 MEQ/L (ref 8–16)
AST SERPL-CCNC: 31 U/L (ref 5–40)
BILIRUB CONJ SERPL-MCNC: < 0.2 MG/DL (ref 0–0.3)
BILIRUB SERPL-MCNC: 0.4 MG/DL (ref 0.3–1.2)
BUN SERPL-MCNC: 12 MG/DL (ref 7–22)
BUN SERPL-MCNC: 13 MG/DL (ref 7–22)
BUN SERPL-MCNC: 14 MG/DL (ref 7–22)
BUN SERPL-MCNC: 16 MG/DL (ref 7–22)
CA-I BLD ISE-SCNC: 1.15 MMOL/L (ref 1.12–1.32)
CALCIUM SERPL-MCNC: 7.4 MG/DL (ref 8.5–10.5)
CALCIUM SERPL-MCNC: 8 MG/DL (ref 8.5–10.5)
CALCIUM SERPL-MCNC: 8.3 MG/DL (ref 8.5–10.5)
CALCIUM SERPL-MCNC: 8.4 MG/DL (ref 8.5–10.5)
CHLORIDE SERPL-SCNC: 109 MEQ/L (ref 98–111)
CHLORIDE SERPL-SCNC: 112 MEQ/L (ref 98–111)
CHLORIDE SERPL-SCNC: 112 MEQ/L (ref 98–111)
CHLORIDE SERPL-SCNC: 114 MEQ/L (ref 98–111)
CO2 SERPL-SCNC: 21 MEQ/L (ref 23–33)
CO2 SERPL-SCNC: 23 MEQ/L (ref 23–33)
CO2 SERPL-SCNC: 24 MEQ/L (ref 23–33)
CO2 SERPL-SCNC: 26 MEQ/L (ref 23–33)
CREAT SERPL-MCNC: 0.6 MG/DL (ref 0.4–1.2)
CREAT SERPL-MCNC: 0.7 MG/DL (ref 0.4–1.2)
DEPRECATED RDW RBC AUTO: 50.8 FL (ref 35–45)
ERYTHROCYTE [DISTWIDTH] IN BLOOD BY AUTOMATED COUNT: 14.7 % (ref 11.5–14.5)
GFR SERPL CREATININE-BSD FRML MDRD: > 60 ML/MIN/1.73M2
GLUCOSE BLD STRIP.AUTO-MCNC: 196 MG/DL (ref 70–108)
GLUCOSE BLD STRIP.AUTO-MCNC: 215 MG/DL (ref 70–108)
GLUCOSE BLD STRIP.AUTO-MCNC: 278 MG/DL (ref 70–108)
GLUCOSE BLD STRIP.AUTO-MCNC: 281 MG/DL (ref 70–108)
GLUCOSE SERPL-MCNC: 204 MG/DL (ref 70–108)
GLUCOSE SERPL-MCNC: 208 MG/DL (ref 70–108)
GLUCOSE SERPL-MCNC: 257 MG/DL (ref 70–108)
GLUCOSE SERPL-MCNC: 290 MG/DL (ref 70–108)
HCT VFR BLD AUTO: 27.8 % (ref 37–47)
HCT VFR BLD AUTO: 29.3 % (ref 37–47)
HGB BLD-MCNC: 8.6 GM/DL (ref 12–16)
HGB BLD-MCNC: 9 GM/DL (ref 12–16)
LACTATE SERPL-SCNC: 1.8 MMOL/L (ref 0.5–2)
MAGNESIUM SERPL-MCNC: 1.6 MG/DL (ref 1.6–2.4)
MAGNESIUM SERPL-MCNC: 1.7 MG/DL (ref 1.6–2.4)
MAGNESIUM SERPL-MCNC: 1.7 MG/DL (ref 1.6–2.4)
MCH RBC QN AUTO: 29.2 PG (ref 26–33)
MCHC RBC AUTO-ENTMCNC: 30.7 GM/DL (ref 32.2–35.5)
MCV RBC AUTO: 95.1 FL (ref 81–99)
PLATELET # BLD AUTO: 200 THOU/MM3 (ref 130–400)
PMV BLD AUTO: 10.4 FL (ref 9.4–12.4)
POTASSIUM SERPL-SCNC: 2.9 MEQ/L (ref 3.5–5.2)
POTASSIUM SERPL-SCNC: 3.1 MEQ/L (ref 3.5–5.2)
POTASSIUM SERPL-SCNC: 3.3 MEQ/L (ref 3.5–5.2)
POTASSIUM SERPL-SCNC: 3.4 MEQ/L (ref 3.5–5.2)
PROT SERPL-MCNC: 5.7 G/DL (ref 6.1–8)
RBC # BLD AUTO: 3.08 MILL/MM3 (ref 4.2–5.4)
SODIUM SERPL-SCNC: 143 MEQ/L (ref 135–145)
SODIUM SERPL-SCNC: 144 MEQ/L (ref 135–145)
SODIUM SERPL-SCNC: 147 MEQ/L (ref 135–145)
SODIUM SERPL-SCNC: 148 MEQ/L (ref 135–145)
SODIUM SERPL-SCNC: 149 MEQ/L (ref 135–145)
TROPONIN, HIGH SENSITIVITY: 42 NG/L (ref 0–12)
WBC # BLD AUTO: 11.3 THOU/MM3 (ref 4.8–10.8)

## 2023-10-20 PROCEDURE — 94761 N-INVAS EAR/PLS OXIMETRY MLT: CPT

## 2023-10-20 PROCEDURE — 6370000000 HC RX 637 (ALT 250 FOR IP): Performed by: NURSE PRACTITIONER

## 2023-10-20 PROCEDURE — 93005 ELECTROCARDIOGRAM TRACING: CPT

## 2023-10-20 PROCEDURE — 6360000002 HC RX W HCPCS: Performed by: PHARMACIST

## 2023-10-20 PROCEDURE — 2580000003 HC RX 258

## 2023-10-20 PROCEDURE — 36415 COLL VENOUS BLD VENIPUNCTURE: CPT

## 2023-10-20 PROCEDURE — 2700000000 HC OXYGEN THERAPY PER DAY

## 2023-10-20 PROCEDURE — 6360000002 HC RX W HCPCS: Performed by: PHYSICIAN ASSISTANT

## 2023-10-20 PROCEDURE — 85018 HEMOGLOBIN: CPT

## 2023-10-20 PROCEDURE — 2580000003 HC RX 258: Performed by: EMERGENCY MEDICINE

## 2023-10-20 PROCEDURE — 6360000002 HC RX W HCPCS

## 2023-10-20 PROCEDURE — 99291 CRITICAL CARE FIRST HOUR: CPT | Performed by: INTERNAL MEDICINE

## 2023-10-20 PROCEDURE — 83605 ASSAY OF LACTIC ACID: CPT

## 2023-10-20 PROCEDURE — 82248 BILIRUBIN DIRECT: CPT

## 2023-10-20 PROCEDURE — 2500000003 HC RX 250 WO HCPCS: Performed by: INTERNAL MEDICINE

## 2023-10-20 PROCEDURE — 99232 SBSQ HOSP IP/OBS MODERATE 35: CPT | Performed by: PHYSICIAN ASSISTANT

## 2023-10-20 PROCEDURE — 82948 REAGENT STRIP/BLOOD GLUCOSE: CPT

## 2023-10-20 PROCEDURE — 83735 ASSAY OF MAGNESIUM: CPT

## 2023-10-20 PROCEDURE — 2580000003 HC RX 258: Performed by: NURSE PRACTITIONER

## 2023-10-20 PROCEDURE — 85027 COMPLETE CBC AUTOMATED: CPT

## 2023-10-20 PROCEDURE — 36620 INSERTION CATHETER ARTERY: CPT

## 2023-10-20 PROCEDURE — 99024 POSTOP FOLLOW-UP VISIT: CPT | Performed by: PHYSICIAN ASSISTANT

## 2023-10-20 PROCEDURE — 6370000000 HC RX 637 (ALT 250 FOR IP)

## 2023-10-20 PROCEDURE — 71045 X-RAY EXAM CHEST 1 VIEW: CPT

## 2023-10-20 PROCEDURE — 85014 HEMATOCRIT: CPT

## 2023-10-20 PROCEDURE — 84484 ASSAY OF TROPONIN QUANT: CPT

## 2023-10-20 PROCEDURE — 2500000003 HC RX 250 WO HCPCS

## 2023-10-20 PROCEDURE — 6360000002 HC RX W HCPCS: Performed by: EMERGENCY MEDICINE

## 2023-10-20 PROCEDURE — 6360000002 HC RX W HCPCS: Performed by: INTERNAL MEDICINE

## 2023-10-20 PROCEDURE — 95711 VEEG 2-12 HR UNMONITORED: CPT

## 2023-10-20 PROCEDURE — 37799 UNLISTED PX VASCULAR SURGERY: CPT

## 2023-10-20 PROCEDURE — 2580000003 HC RX 258: Performed by: PHARMACIST

## 2023-10-20 PROCEDURE — 95714 VEEG EA 12-26 HR UNMNTR: CPT

## 2023-10-20 PROCEDURE — 2000000000 HC ICU R&B

## 2023-10-20 PROCEDURE — 6370000000 HC RX 637 (ALT 250 FOR IP): Performed by: EMERGENCY MEDICINE

## 2023-10-20 PROCEDURE — 80053 COMPREHEN METABOLIC PANEL: CPT

## 2023-10-20 PROCEDURE — 2580000003 HC RX 258: Performed by: PHYSICIAN ASSISTANT

## 2023-10-20 PROCEDURE — 6360000002 HC RX W HCPCS: Performed by: NURSE PRACTITIONER

## 2023-10-20 PROCEDURE — 94003 VENT MGMT INPAT SUBQ DAY: CPT

## 2023-10-20 PROCEDURE — 6370000000 HC RX 637 (ALT 250 FOR IP): Performed by: INTERNAL MEDICINE

## 2023-10-20 PROCEDURE — 84295 ASSAY OF SERUM SODIUM: CPT

## 2023-10-20 PROCEDURE — 82330 ASSAY OF CALCIUM: CPT

## 2023-10-20 RX ORDER — AMLODIPINE BESYLATE 5 MG/1
5 TABLET ORAL DAILY
Status: DISCONTINUED | OUTPATIENT
Start: 2023-10-20 | End: 2023-11-20

## 2023-10-20 RX ORDER — METOPROLOL TARTRATE 1 MG/ML
5 INJECTION, SOLUTION INTRAVENOUS EVERY 4 HOURS PRN
Status: DISCONTINUED | OUTPATIENT
Start: 2023-10-20 | End: 2023-11-20

## 2023-10-20 RX ORDER — PHENYTOIN SODIUM 50 MG/ML
150 INJECTION INTRAMUSCULAR; INTRAVENOUS EVERY 12 HOURS
Status: DISCONTINUED | OUTPATIENT
Start: 2023-10-20 | End: 2023-11-03 | Stop reason: ALTCHOICE

## 2023-10-20 RX ORDER — METOPROLOL TARTRATE 1 MG/ML
10 INJECTION, SOLUTION INTRAVENOUS ONCE
Status: DISCONTINUED | OUTPATIENT
Start: 2023-10-20 | End: 2023-10-20

## 2023-10-20 RX ORDER — NOREPINEPHRINE BITARTRATE 0.06 MG/ML
INJECTION, SOLUTION INTRAVENOUS
Status: COMPLETED
Start: 2023-10-20 | End: 2023-10-20

## 2023-10-20 RX ORDER — INSULIN LISPRO 100 [IU]/ML
0-4 INJECTION, SOLUTION INTRAVENOUS; SUBCUTANEOUS NIGHTLY
Status: DISCONTINUED | OUTPATIENT
Start: 2023-10-20 | End: 2023-10-22

## 2023-10-20 RX ORDER — METOPROLOL TARTRATE 1 MG/ML
5 INJECTION, SOLUTION INTRAVENOUS EVERY 6 HOURS PRN
Status: DISCONTINUED | OUTPATIENT
Start: 2023-10-20 | End: 2023-10-20

## 2023-10-20 RX ORDER — POTASSIUM CHLORIDE 20 MEQ/1
40 TABLET, EXTENDED RELEASE ORAL PRN
Status: ACTIVE | OUTPATIENT
Start: 2023-10-20

## 2023-10-20 RX ORDER — 0.9 % SODIUM CHLORIDE 0.9 %
1000 INTRAVENOUS SOLUTION INTRAVENOUS ONCE
Status: COMPLETED | OUTPATIENT
Start: 2023-10-20 | End: 2023-10-20

## 2023-10-20 RX ORDER — MAGNESIUM SULFATE IN WATER 40 MG/ML
2000 INJECTION, SOLUTION INTRAVENOUS ONCE
Status: COMPLETED | OUTPATIENT
Start: 2023-10-20 | End: 2023-10-21

## 2023-10-20 RX ORDER — POTASSIUM CHLORIDE 7.45 MG/ML
10 INJECTION INTRAVENOUS PRN
Status: ACTIVE | OUTPATIENT
Start: 2023-10-20

## 2023-10-20 RX ORDER — AMLODIPINE BESYLATE 5 MG/1
5 TABLET ORAL DAILY
Status: DISCONTINUED | OUTPATIENT
Start: 2023-10-21 | End: 2023-10-20

## 2023-10-20 RX ORDER — AMLODIPINE BESYLATE 5 MG/1
5 TABLET ORAL DAILY
Status: DISCONTINUED | OUTPATIENT
Start: 2023-10-20 | End: 2023-10-20

## 2023-10-20 RX ORDER — CARVEDILOL 6.25 MG/1
6.25 TABLET ORAL 2 TIMES DAILY WITH MEALS
Status: DISCONTINUED | OUTPATIENT
Start: 2023-10-20 | End: 2023-10-20

## 2023-10-20 RX ORDER — PHENYTOIN SODIUM 50 MG/ML
50 INJECTION INTRAMUSCULAR; INTRAVENOUS DAILY
Status: DISCONTINUED | OUTPATIENT
Start: 2023-10-21 | End: 2023-11-03 | Stop reason: ALTCHOICE

## 2023-10-20 RX ORDER — POTASSIUM CHLORIDE 29.8 MG/ML
20 INJECTION INTRAVENOUS PRN
Status: DISPENSED | OUTPATIENT
Start: 2023-10-20

## 2023-10-20 RX ORDER — INSULIN LISPRO 100 [IU]/ML
0-8 INJECTION, SOLUTION INTRAVENOUS; SUBCUTANEOUS
Status: DISCONTINUED | OUTPATIENT
Start: 2023-10-20 | End: 2023-10-22

## 2023-10-20 RX ORDER — 3% SODIUM CHLORIDE 3 G/100ML
10 INJECTION, SOLUTION INTRAVENOUS CONTINUOUS
Status: DISCONTINUED | OUTPATIENT
Start: 2023-10-20 | End: 2023-10-20

## 2023-10-20 RX ORDER — NOREPINEPHRINE BITARTRATE 0.06 MG/ML
1-100 INJECTION, SOLUTION INTRAVENOUS CONTINUOUS
Status: DISCONTINUED | OUTPATIENT
Start: 2023-10-20 | End: 2023-10-27

## 2023-10-20 RX ORDER — PROPOFOL 10 MG/ML
20 INJECTION, EMULSION INTRAVENOUS CONTINUOUS
Status: DISPENSED | OUTPATIENT
Start: 2023-10-20 | End: 2023-10-21

## 2023-10-20 RX ORDER — INSULIN GLARGINE 100 [IU]/ML
5 INJECTION, SOLUTION SUBCUTANEOUS NIGHTLY
Status: DISCONTINUED | OUTPATIENT
Start: 2023-10-20 | End: 2023-10-23

## 2023-10-20 RX ORDER — POTASSIUM CHLORIDE 29.8 MG/ML
20 INJECTION INTRAVENOUS
Status: COMPLETED | OUTPATIENT
Start: 2023-10-20 | End: 2023-10-21

## 2023-10-20 RX ORDER — CARVEDILOL 6.25 MG/1
6.25 TABLET ORAL 2 TIMES DAILY WITH MEALS
Status: DISCONTINUED | OUTPATIENT
Start: 2023-10-20 | End: 2023-11-20

## 2023-10-20 RX ORDER — BUMETANIDE 0.25 MG/ML
0.5 INJECTION INTRAMUSCULAR; INTRAVENOUS EVERY 24 HOURS
Status: DISCONTINUED | OUTPATIENT
Start: 2023-10-20 | End: 2023-11-03

## 2023-10-20 RX ADMIN — SODIUM CHLORIDE, PRESERVATIVE FREE 10 ML: 5 INJECTION INTRAVENOUS at 20:09

## 2023-10-20 RX ADMIN — CARVEDILOL 6.25 MG: 6.25 TABLET, FILM COATED ORAL at 17:36

## 2023-10-20 RX ADMIN — PIPERACILLIN AND TAZOBACTAM 3375 MG: 3; .375 INJECTION, POWDER, LYOPHILIZED, FOR SOLUTION INTRAVENOUS at 03:29

## 2023-10-20 RX ADMIN — NOREPINEPHRINE BITARTRATE 5 MCG/MIN: 0.06 INJECTION, SOLUTION INTRAVENOUS at 19:41

## 2023-10-20 RX ADMIN — POTASSIUM CHLORIDE 20 MEQ: 29.8 INJECTION, SOLUTION INTRAVENOUS at 23:08

## 2023-10-20 RX ADMIN — SODIUM CHLORIDE 1000 ML: 9 INJECTION, SOLUTION INTRAVENOUS at 19:53

## 2023-10-20 RX ADMIN — MAGNESIUM SULFATE HEPTAHYDRATE 2000 MG: 40 INJECTION, SOLUTION INTRAVENOUS at 23:14

## 2023-10-20 RX ADMIN — MODAFINIL 200 MG: 100 TABLET ORAL at 09:41

## 2023-10-20 RX ADMIN — PIPERACILLIN AND TAZOBACTAM 3375 MG: 3; .375 INJECTION, POWDER, LYOPHILIZED, FOR SOLUTION INTRAVENOUS at 18:00

## 2023-10-20 RX ADMIN — PHENYTOIN SODIUM 150 MG: 50 INJECTION INTRAMUSCULAR; INTRAVENOUS at 20:48

## 2023-10-20 RX ADMIN — VANCOMYCIN HYDROCHLORIDE 1000 MG: 1 INJECTION, POWDER, LYOPHILIZED, FOR SOLUTION INTRAVENOUS at 11:18

## 2023-10-20 RX ADMIN — PROPOFOL 20 MCG/KG/MIN: 10 INJECTION, EMULSION INTRAVENOUS at 15:31

## 2023-10-20 RX ADMIN — FAMOTIDINE 20 MG: 20 TABLET ORAL at 09:41

## 2023-10-20 RX ADMIN — SODIUM CHLORIDE, PRESERVATIVE FREE 10 ML: 5 INJECTION INTRAVENOUS at 09:41

## 2023-10-20 RX ADMIN — INSULIN LISPRO 4 UNITS: 100 INJECTION, SOLUTION INTRAVENOUS; SUBCUTANEOUS at 17:36

## 2023-10-20 RX ADMIN — Medication 5 MCG/MIN: at 19:41

## 2023-10-20 RX ADMIN — LEVETIRACETAM 1000 MG: 100 INJECTION, SOLUTION INTRAVENOUS at 17:42

## 2023-10-20 RX ADMIN — INSULIN LISPRO 4 UNITS: 100 INJECTION, SOLUTION INTRAVENOUS; SUBCUTANEOUS at 06:39

## 2023-10-20 RX ADMIN — FAMOTIDINE 20 MG: 20 TABLET ORAL at 20:32

## 2023-10-20 RX ADMIN — METOPROLOL TARTRATE 5 MG: 5 INJECTION INTRAVENOUS at 11:11

## 2023-10-20 RX ADMIN — CHLORHEXIDINE GLUCONATE 0.12% ORAL RINSE 15 ML: 1.2 LIQUID ORAL at 20:08

## 2023-10-20 RX ADMIN — LEVETIRACETAM 1000 MG: 100 INJECTION, SOLUTION INTRAVENOUS at 05:32

## 2023-10-20 RX ADMIN — PIPERACILLIN AND TAZOBACTAM 3375 MG: 3; .375 INJECTION, POWDER, LYOPHILIZED, FOR SOLUTION INTRAVENOUS at 09:39

## 2023-10-20 RX ADMIN — CHLORHEXIDINE GLUCONATE 0.12% ORAL RINSE 15 ML: 1.2 LIQUID ORAL at 09:41

## 2023-10-20 RX ADMIN — VANCOMYCIN HYDROCHLORIDE 1000 MG: 1 INJECTION, POWDER, LYOPHILIZED, FOR SOLUTION INTRAVENOUS at 23:10

## 2023-10-20 RX ADMIN — INSULIN LISPRO 4 UNITS: 100 INJECTION, SOLUTION INTRAVENOUS; SUBCUTANEOUS at 02:37

## 2023-10-20 RX ADMIN — POTASSIUM BICARBONATE 40 MEQ: 782 TABLET, EFFERVESCENT ORAL at 07:07

## 2023-10-20 RX ADMIN — ACETAMINOPHEN 650 MG: 650 SOLUTION ORAL at 21:32

## 2023-10-20 RX ADMIN — PHENYTOIN SODIUM 1680 MG: 50 INJECTION INTRAMUSCULAR; INTRAVENOUS at 14:43

## 2023-10-20 RX ADMIN — INSULIN GLARGINE 5 UNITS: 100 INJECTION, SOLUTION SUBCUTANEOUS at 20:32

## 2023-10-20 RX ADMIN — BUMETANIDE 0.5 MG: 0.25 INJECTION INTRAMUSCULAR; INTRAVENOUS at 17:53

## 2023-10-20 RX ADMIN — AMLODIPINE BESYLATE 5 MG: 5 TABLET ORAL at 17:36

## 2023-10-20 ASSESSMENT — PULMONARY FUNCTION TESTS
PIF_VALUE: 19
PIF_VALUE: 17
PIF_VALUE: 19
PIF_VALUE: 19

## 2023-10-20 NOTE — PROCEDURES
LONG-TERM EEG-VIDEO MONITORING   CLINICAL [facility]    Patient: Devin Lerma  Age: 81 y.o. MRN: [id number]    Referring Physician: No ref. provider found  History: The patient is a 81 y.o. female who had a cardiac arrest and subsequent jerks concerning for seizures. This long-term video-EEG monitoring study was performed to evaluate for seizures. The patient is on neuroactive medications.    [facility]   Current Facility-Administered Medications   Medication Dose Route Frequency Provider Last Rate Last Admin    potassium chloride (KLOR-CON M) extended release tablet 40 mEq  40 mEq Oral PRN Ronn Harmon MD        Or    potassium bicarb-citric acid (EFFER-K) effervescent tablet 40 mEq  40 mEq Oral PRN Ronn Harmon MD   40 mEq at 10/20/23 4905    Or    potassium chloride 10 mEq/100 mL IVPB (Peripheral Line)  10 mEq IntraVENous PRN Ronn Harmon MD        phenytoin (DILANTIN) injection 150 mg  150 mg IntraVENous Q12H Kaylee Allan PA-C        [START ON 10/21/2023] phenytoin (DILANTIN) injection 50 mg  50 mg IntraVENous Daily Kaylee Allan PA-C        insulin lispro (HUMALOG) injection vial 0-8 Units  0-8 Units SubCUTAneous TID  Ronn Harmon MD   4 Units at 10/20/23 1736    insulin lispro (HUMALOG) injection vial 0-4 Units  0-4 Units SubCUTAneous Nightly Ronn Harmon MD        insulin glargine (LANTUS) injection vial 5 Units  5 Units SubCUTAneous Nightly Ronn Harmon MD        metoprolol (LOPRESSOR) injection 5 mg  5 mg IntraVENous Q4H PRN Ronn Harmon MD        carvedilol (COREG) tablet 6.25 mg  6.25 mg Orogastric BID  Elizabeth Jens, DO   6.25 mg at 10/20/23 1736    amLODIPine (NORVASC) tablet 5 mg  5 mg Orogastric Daily Elizabeth Jens, DO   5 mg at 10/20/23 1736    bumetanide (BUMEX) injection 0.5 mg  0.5 mg IntraVENous Q24H Enrrique Santana MD   0.5 mg at 10/20/23 5813 variability, evidence of reactivity and state changes seen on this study which were not present on the study on 10/17/2023. Monitoring was continued in order to evaluate for seizures. The EKG channel revealed no abnormalities. Please note this is a preliminary report and updated daily. The final report will have a summary of behavior and electrographic findings with clinical correlation. Abdiel Garsia DO  Neurology/Epilepsy     Day 4 - 10/23/23, ending at 07:28    Interictal EEG Samples: The background was discontinuous, comprised of disorganized, attenuated primarily delta during the quiet state and with more continuity and higher amplitude delta with a faster frequency admixture during the active state. There was no discernable PDR. There was evidence of variability and reactivity. Spontaneous state changes were seen as described above. Sleep architecture was not visualized. There were frequent runs of generalized periodic discharges (GPDs) with atypical triphasic morphology (triphasic waves), up to 1 Hz, without evolution or associated clinical symptoms. These were primarily seen during the active state, indicating a state dependent pattern. The EKG channel revealed no abnormalities. Ictal EEG Recording / Patient Events: During this period the patient had no events or seizures. Summary: During this day of recording no events were recorded. The interictal EEG was abnormal due to:  -Diffuse background slowing and disorganization  -Frequent runs of GPDs with atypical triphasic morphology    The background abnormalities indicated a moderate to severe encephalopathy, nonspecific to etiology. The GPDs with atypical triphasic morphology indicated underlying cortical irritability/increased risk of seizures, and can be seen in various encephalopathies.     This study was improved compared to an EEG done on 10/19/2023 and significantly improved from the original study done on 10/17/2023 due to

## 2023-10-20 NOTE — SIGNIFICANT EVENT
Patient with some rhythmic arm pronation bilaterally. Will place on propofol till 9 am tomorrow at 20 mcg/kg/min and then reassess in am.    Electronically signed by Ladi Valdez.  Juan Baum MD.

## 2023-10-20 NOTE — PROGRESS NOTES
Neurology Progress Note    Date:10/20/2023       Room:[facility]08/008-A  Patient Name:Kaleigh Vazquez     YOB: 1950     Age:72 y.o. Chief Complaint: tracheal stenosis      Subjective     Samy Camarena is a 81 y.o. female with a history of coronary artery disease, hyperlipidemia, type 2 diabetes mellitus, provoked DVT/PE after hip surgery, SNBCN-03 infection complicated by 2 weeks of ventilator support and tracheostomy dependence who presents to [address] [place] [address] on 10/12/2023 for ENT consultation for a segmental cricoid tracheal resection and removal of her tracheostomy. On 10/15/2023 she was placed on spontaneous breathing trial for 5 minutes and was able to tolerate this. On 10/16/2023 she failed her spontaneous breathing trial and had to go back on AC/PC due to going apneic in the morning but then later in the morning she tolerated SBT and was extubated around 1236 on 10/16/23 to BIPAP. After 2 minutes she stated she could not breathe and her oxygen saturation dropped to 53%. Her BiPAP settings were increased and primary team was made aware of need for reintubation. By the time the primary team arrived she was hypoxic and receiving CPR as she went into cardiac arrest secondary to respiratory arrest. Due to the complexity of her airway, it took multiple attempts to secure her airway with ETT. She has been off sedation since 0711 on 10/17/2023. EEG is showing no seizure activity and minimal brain activity. She reportedly had seizure-like activity after the code yesterday, but no further seizure activity noted. She currently is not following commands on exam.  She does withdraw/posture to painful stimulation. Interval history 10/18/23:  No acute events overnight. She is still withdrawing to pain. Not following commands on exam. She remains intubated and off sedation. Interval history 10/19/23: The patient remains intubated. Per nursing staff, she was noted to have a cough and a gag. posterior tibial vein. The remainder of the veins are patent without evidence for thrombus. The popliteal veins, femoral veins and common femoral veins are patent. Impression: Nonocclusive thrombus within the right posterior tibial vein and peroneal vein. There is also nonocclusive thrombus within the left posterior tibial vein. This document has been electronically signed by: Fabiola Talbot MD on 10/13/2023 12:21 AM Technique Used: Duplex examination performed utilizing grayscale, color and spectral analysis. Assessment and Plan:        Encephalopathy following cardiac arrest  EEG on 10/19/2023 with improvement in background abnormalities, but presence of GPDs. No clinical or electrographic seizures noted. Likely secondary to anoxic injury. Given EEG findings, will proceed with continuous EEG over the weekend  Load phenytoin 20mg/kg followed by maintenance dose of 200mg in the morning and 150mg in the evening. Check phenytoin level tomorrow morning. Continue Keppra 1,000mg twice daily  Initial MRI, repeat MRI without evidence of anoxic injury. Provigil 200mg daily to promote wakefulness   Neurology following     This case was discussed with Dr. Lei Cherry and he is in agreement with the assessment and plan.     Electronically signed by Jered Alexander PA-C on 10/20/23 at 12:53 PM EDT

## 2023-10-20 NOTE — PLAN OF CARE
Problem: Respiratory - Adult  Goal: Achieves optimal ventilation and oxygenation  Outcome: Progressing  Achieves optimal ventilation and oxygenation:   Assess for changes in respiratory status   Position to facilitate oxygenation and minimize respiratory effort   Assess the need for suctioning and aspirate as needed   Respiratory therapy support as indicated   Assess for changes in mentation and behavior   Oxygen supplementation based on oxygen saturation or arterial blood gases   Assess and instruct to report shortness of breath or any respiratory difficulty  Note: Intubated/Vented. Wean as tolerated. SBT when appropriate.

## 2023-10-20 NOTE — PROGRESS NOTES
CRITICAL CARE PROGRESS NOTE      Patient:  Rhea Wadena    Unit/Bed:4D-08/008-A  YOB: 1950  MRN: [id number]   PCP: Jason Miranda MD  Date of Admission: 10/12/2023  Chief Complaint:- tracheal stenosis s/p resection    Assessment and Plan:    Anoxic Brain Injury:  2/2 respiratory arrest and subsequent cardiac arrest following failed extubation 10/16   EEG 10/17: diffuse background attenuation and suppression w/out evidence of reactivity. No seizures. CT Head 10/17: no acute findings   MRI Brain 10/17: no acute findings   MRI Brain 10/19: No evidence of anoxia, no evidence of acute infarct   EEG 10/19: No seizures, moderate to severe encephalopathy   3% @ 15mL/hr with goal Na 145-150 for concerns of cerebral edema   Neuro following, started Provigil 200mg   Seizure prophylaxis with Keppra   On zosyn and vancomycin for surgical site infection prophylaxis   S/p cricotracheal resection, laryngoplasty with flap and vocal fold lateralization stitch placement: with Dr. Sugar Iqbal   POD 8   HOB at 30 degrees with pillow under head for semi-flexed position   Monitor RACHEL drains, 20mL output last 24 hrs   10/18: 5.0 ET tube exchanged for 7.0 ET tube by Dr. Sugar Iqbal   Bilateral DVTs, superficial:    Venous Duplex Lower Extremity 10/12: thrombus in BL posterior tibial veins and right peroneal vein   Patient off of heparin and started on Lovenox   DM Type 2:  01/2023 HbA1c: 5.5   Medium dose SSI   Chronic diastolic CHF:  History   ECHO 1/2/23: EF 60-65%  Strict I/O   Monitor for fluid overload    INITIAL H AND P AND ICU COURSE:  Per chart review:     \"79 yo F w/significant PMHx of  CAD, HLD, NIDDM2, H/o provoked DVT/PE after hip surgery (not on [address]), Obesity, H/o hip fracture s/p ORIF, H/o melena / diverticulitis, and COVID-19 infection s/p Trach (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 Roula Catalan. Pt is POD #0 and transferred to ICU for close airway monitoring. On arrival to ICU, pt intubated and sedated, 2-pillow prop to maintain forward flexion of neck at all times, b/l suction-bulb drains in neck, midline transverse surgical incision site noted on anterior neck. \"     \"10/13: No acute events overnight. 10/14: Yesterday morning, CXR revealed a R mainstem intubation, it was retracted 2.5 cm and repeat CXR confirms placement above the telma. 10/15: Yesterday Pt had bloody sputum emerge from outside the ETT tube. Based on CXR, POCUS, and I & O's it was suspected she had volume overload, given 1 mg Bumex and IVF held. Heparin gtt started for DVT treatment. 10/16: No acute events over night. Pt is still intubated and sedated. 10/17:  No acute events overnight. Of note, patient unable to tolerate trial of extubation on 10/16. Difficult to re-intubate. Patient suffered respiratory distress, followed by cardiac arrest, which led to anoxic brain injury. At this time, it is too early to determine reversibility of brain injury. EEG x 4 hours done today (10/17) showed diffuse background attenuation and suppression without evidence of reactivity. No seizures recorded. Head CT done today showed no acute findings. Plan for continuous EEG today and MRI brain tomorrow. 10/18: No acute events over night. Pt exhibiting decorticate posturing. +cough and gag. Pupils 2-3 mm, round, equal and reactive. Plan for OR today with Dr Roula Catalan. 10/19: Yesterday Dr Roula Catalan exchanged her 5 ETT for a 7 ETT, minimal EBL. No acute events over night. \"    10/20: No acute events overnight. Patient is withdrawing her lower extremities to pain. Patient does not withdrawal to pain in the upper extremities. Past Medical History:  Obtained from chart review due to patient being intubated.

## 2023-10-20 NOTE — PROGRESS NOTES
Patient Weaning Progress    The patient's vent settings was able to be weaned this shift. Ventilator settings that were weaned              [x] Mode   [] Pressure support weaned   [] Fio2 weaned   [] Peep weaned      Spontaneous weaning trial  was attempted. The patient was able to tolerate SBT. RSBI  was 45 with EtCO2 of 33 and SpO2 of 96 on 21% FiO2. Spontanteous VT was 353 and RR 16 breaths/min. Cuff was not  deflated to determine cuff leak. Unable to get agreement for goals because no family is present and patient cannot respond.

## 2023-10-20 NOTE — PROGRESS NOTES
[facility] Laboratory Technician Worksheet      EEG Date: 10/20/2023    Name: Devin Lerma   : 1950   Age: 81 y.o. SEX: female    ROOM: 12 MRN: [id number]           CSN: [id number]     Continuous eeg    Ordering Provider: Marilyn Lopez  EEG Number: [id number] Time of Test:  905    Hand: unknown   Sedation: no    H.V. Done: No  vent, age protocol  Photic: Yes    Sleep: Yes  Drowsy: No   Sleep Deprived: No    Seizures observed: no    Mentality: lethargic on eye open command,  pain react,       Clinical History: previous eeg showed gpds with triphasic morphology,  react with pain stimuli,    Mri     IMPRESSION:     1. No evidence of an acute infarct. No evidence of anoxia. 2. Stable mild severity chronic small vessel ischemic changes. 3. Global volume loss.        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    Hyperlipidemia     Lower back pain     With diagnosis of having \"stress fracture\" by ortho at [facility]    Primary hypertension     Pulmonary embolism ([address]) 2023    Right lower lobe    Type 2 diabetes mellitus ([address])        Scheduled Meds:   famotidine  20 mg Oral BID    [Held by provider] enoxaparin  1 mg/kg SubCUTAneous BID    insulin lispro  0-16 Units SubCUTAneous Q4H    modafinil  200 mg Oral Daily    scopolamine  1 patch TransDERmal Q72H    levETIRAcetam  1,000 mg IntraVENous Q12H    vancomycin  1,000 mg IntraVENous Q12H    sodium chloride flush  5-40 mL IntraVENous 2 times per day    chlorhexidine  15 mL Mouth/Throat BID    piperacillin-tazobactam  3,375 mg IntraVENous Q8H    vancomycin (VANCOCIN) intermittent dosing (placeholder)   Other RX Placeholder     Continuous Infusions:   3% sodium chloride 10 mL/hr (10/20/23 0704)    sodium

## 2023-10-20 NOTE — PROGRESS NOTES
Comprehensive Nutrition Assessment    Type and Reason for Visit:  Reassess    Nutrition Recommendations/Plan:   As no longer receiving kcals from [facility] - increase TF to Vital 1.2 bolus 215 ml every 4 hours to better meet nutritional needs. Free H20 flush 30ml before and after each bolus (or per provider)     Malnutrition Assessment:  Malnutrition Status:  Insufficient data (10/13/23 1128)    Context:  Chronic Illness     Findings of the 6 clinical characteristics of malnutrition:  Energy Intake:  Unable to assess (pt. intubated)  Weight Loss:  Greater than 10% over 6 months (31# or 16% in 5 months)     Body Fat Loss:  Unable to assess (facial edema; RN asked not to disturb pt. this am)     Muscle Mass Loss:  Unable to assess    Fluid Accumulation:  Mild     Strength:  Not Performed    Nutrition Assessment:     Pt. nutritionally compromised AEB NPO, intubated s/p ENT surgery 10/12, need for nutrition support. At risk for further nutrition compromise r/t admit d/t tracheal stenosis from trach placed 4/2022, complex COVID Hx; s/p code blue 10/16 - anoxic brain injury and underlying medical condition (DM - A1c 5.5% 1/2023, CAD,THR 2/2023). Nutrition Related Findings:      Wound Type: Pressure Injury, Stage II (coccyx; stage 1 buttocks; skin tear abdomen; 10/12 ENT surgery: Cricotracheal Resection, Laryngoplasty with Flap and Vocal Fold Lateralization Stitch Placement)     Pt.  Report/Treatments/Miscellaneous: pt. Seen - remains intubated; s/p code blue 10/16; +NGT; per RN - tolerating TF boluses; no longer receiving kcals from [facility] - per ENT - continue with TF boluses through w/ dietitians recommendations; will increase TF boluses-d/w RN; Neurology following  GI Status: BM 10/17, belly soft per RN  Pertinent Labs: 10/20: Glucose 208, BUN 14, Cr 0.6, Potassium 3.1  Pertinent Meds: Insulin, ATB, Pepcid, Dilaudid      Current Nutrition Intake & Therapies:          Diet NPO Exceptions are: Sips of Water with Meds  ADULT TUBE FEEDING; Nasogastric; Peptide Based; Bolus; 6 Times Daily; 215; Gravity; 30; Before and after each bolus  Current Tube Feeding (TF) Orders:  Feeding Route: Nasogastric (with bridle device)  Formula: Peptide Based (started 10/13)  Schedule: Bolus  Feeding Regimen: Vital 1.2 bolus 150 ml every 4 hours; increasing to 215 ml bolus every 4 hours  Additives/Modulars: None  Water Flushes: 30ml before and after each bolus  Current TF & Flush Orders Provides: 1080 kcals, 68gms protein, 100gms cho, 5gms fiber, 730ml free H20 (1090 with flush) in total volume of 900ml (1260 with flush)/24h  Goal TF & Flush Orders Provides: Vital 1.2 bolus 215 ml every 4 hours = 1548 kcals, 97 gm protein, 143 gm CHO, 7 gm fiber, 1406 ml free water (1046 TF, 360 flushes) in 1650 ml total volume (1290 TF, 360 flushes)/24 hours    Anthropometric Measures:  Height: 157.5 cm (5' 2\")  Ideal Body Weight (IBW): 110 lbs (50 kg)    Admission Body Weight: 76.2 kg (168 lb) (10/12 with facial edema)  Current Body Weight: 83.9 kg (184 lb 15.5 oz) (10/20 +2, +3 edema),  Weight Source: Bed Scale  Current BMI (kg/m2): 33.8  Usual Body Weight: 90.3 kg (199 lb) (5/11/23; 206# 2/28/23)  % Weight Change (Calculated): -15.6                    BMI Categories: Obese Class 1 (BMI 30.0-34. 9)    Estimated Daily Nutrient Needs:  Energy Requirements Based On: Kcal/kg  Weight Used for Energy Requirements: Admission (76kgm)  Energy (kcal/day): 7977-5196 (20-25/kgm)  Weight Used for Protein Requirements: Ideal (50kgm)  Protein (g/day):  grams (1.2-2)  Method Used for Fluid Requirements: Other (Comment)  Fluid (ml/day): per Physician    Nutrition Diagnosis:   Inadequate oral intake related to impaired respiratory function as evidenced by NPO or clear liquid status due to medical condition, intubation, nutrition support - enteral nutrition    Nutrition Interventions:   Food and/or Nutrient Delivery: Continue NPO, Modify Tube Feeding  Nutrition

## 2023-10-20 NOTE — SIGNIFICANT EVENT
ICU Significant Event Note  Notified by patient's RN at approximately 1935 that patient's BP had dropped with MAP in the 30's. Levophed ordered and I presented to bedside, BP 50/24, MAP 32. Propofol was held due to hypotension. 1 L NS bolus ordered due to hypotension. BP cuff adjusted and MAP still in 30's. Levophed increased to 10 mcg/min with improvement in BP to 77/42, MAP 54. Levophed increased to 15 mcg/min. Will continue to monitor MAP and amount of pressor support.      Electronically signed by Carolina Whitlock DO on 10/20/2023 at 8:04 PM

## 2023-10-20 NOTE — PLAN OF CARE
Problem: Respiratory - Adult  Goal: Normal spontaneous ventilation  Outcome: Progressing     Problem: Respiratory - Adult  Goal: Achieves optimal ventilation and oxygenation  Outcome: Progressing                                                  Patient Weaning Progress    Spontaneous weaning trial  was attempted. The patient was able to tolerate SBT. The patient was on SBT for 12 hours. Patient does not have an EVAC tube. Cuff was not  deflated to determine cuff leak. Unable to get agreement for goals because no family is present and patient cannot respond.

## 2023-10-20 NOTE — CARE COORDINATION
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 There is also nonocclusive thrombus within the left posterior tibial vein  10/16 Extubated to bipap  10/16 Code Blue  10/16 Reintubated  10/16 Bronch: thick bloody secretions in RLL removed  10/16 CXR: Mild stable cardiomegaly with pulmonary vascular congestion suspicious for congestive heart failure; Prominent pulmonary interstitium suspicious for pulmonary edema  10/17 5 hr EEG: No seizures noted; diffuse background attenuation and suppression without evidence of reactivity; severe encephalopathy  10/17 CT Head: Stable CT appearance the brain. No acute findings  10/17 MRI Brain: No acute findings  10/18 CT Head: No acute findings  10/18 OR: ET Exchange bronhoscopy and lavage  10/19 4hr 40 min EEG: This EEG was abnormal. The background abnormalities indicated a moderate to severe encephalopathy, nonspecific to etiology. The generalized periodic discharges (GPDs) indicated underlying cortical irritability/increased risk of seizures, and can be seen in various encephalopathies. No seizures were recorded  10/19 MRI Brain: No evidence of an acute infarct. No evidence of anoxia; Stable mild severity chronic small vessel ischemic changes; Global volume loss    Barriers to Discharge: POD #8. Taken to OR on 10/18 for ETT exchange and bronch; exchanged ETT up to a #7. EEG & MRI done - see notes above. On 3% saline with Q4H Na+ levels. Provigil started yesterday. Continuous EEG started today. No sedation since morning of 10/17. Opens eyes to stim. Responds to pain. Remains on vent w/ETT on SBT, FIO2 21%, sats 97%. Tmax 99.9. NSR. Unable to follow commands, moves RUE & BLE to painful stim. RACHEL x2. Intensivist, Neurology, and ENT following. Dietitian and Wound Care following. Telemetry, vanesa, NG w/bolus TF, werner, SCDs. 3% saline @ 10 ml/hr, norvasc, coreg, Peridex, IV pepcid, prn IV dilaudid, lantus, SSI, IV keppra, provigil, prn roxicodone, IV dilantin, IV zosyn, scopolamine patch, IV vancomycin. IV dilantin x1 today. Received 0.6 mg IV bumex x1 yesterday. Na+ 149, K+ 3.3, wbc 11.3, hgb 9. PCP: Bertha Pickering MD  Readmission Risk Score: 20.1%  Patient Goals/Plan/Treatment Preferences: From home w/. Plan pending clinical course.

## 2023-10-21 LAB
ANION GAP SERPL CALC-SCNC: 9 MEQ/L (ref 8–16)
BUN SERPL-MCNC: 17 MG/DL (ref 7–22)
CALCIUM SERPL-MCNC: 8.2 MG/DL (ref 8.5–10.5)
CHLORIDE SERPL-SCNC: 113 MEQ/L (ref 98–111)
CO2 SERPL-SCNC: 22 MEQ/L (ref 23–33)
CREAT SERPL-MCNC: 0.7 MG/DL (ref 0.4–1.2)
DEPRECATED RDW RBC AUTO: 50.6 FL (ref 35–45)
EKG ATRIAL RATE: 88 BPM
EKG P AXIS: 50 DEGREES
EKG P-R INTERVAL: 138 MS
EKG Q-T INTERVAL: 426 MS
EKG QRS DURATION: 76 MS
EKG QTC CALCULATION (BAZETT): 515 MS
EKG R AXIS: 61 DEGREES
EKG T AXIS: 141 DEGREES
EKG VENTRICULAR RATE: 88 BPM
ERYTHROCYTE [DISTWIDTH] IN BLOOD BY AUTOMATED COUNT: 15.1 % (ref 11.5–14.5)
GFR SERPL CREATININE-BSD FRML MDRD: > 60 ML/MIN/1.73M2
GLUCOSE BLD STRIP.AUTO-MCNC: 277 MG/DL (ref 70–108)
GLUCOSE BLD STRIP.AUTO-MCNC: 305 MG/DL (ref 70–108)
GLUCOSE BLD STRIP.AUTO-MCNC: 308 MG/DL (ref 70–108)
GLUCOSE BLD STRIP.AUTO-MCNC: 311 MG/DL (ref 70–108)
GLUCOSE SERPL-MCNC: 359 MG/DL (ref 70–108)
HCT VFR BLD AUTO: 27 % (ref 37–47)
HGB BLD-MCNC: 8.6 GM/DL (ref 12–16)
MAGNESIUM SERPL-MCNC: 2.1 MG/DL (ref 1.6–2.4)
MCH RBC QN AUTO: 29.6 PG (ref 26–33)
MCHC RBC AUTO-ENTMCNC: 31.9 GM/DL (ref 32.2–35.5)
MCV RBC AUTO: 92.8 FL (ref 81–99)
PHENYTOIN SERPL-MCNC: 14.6 UG/ML (ref 6–18)
PLATELET # BLD AUTO: 226 THOU/MM3 (ref 130–400)
PMV BLD AUTO: 10.7 FL (ref 9.4–12.4)
POTASSIUM SERPL-SCNC: 4.5 MEQ/L (ref 3.5–5.2)
RBC # BLD AUTO: 2.91 MILL/MM3 (ref 4.2–5.4)
SODIUM SERPL-SCNC: 144 MEQ/L (ref 135–145)
TROPONIN, HIGH SENSITIVITY: 44 NG/L (ref 0–12)
WBC # BLD AUTO: 10.4 THOU/MM3 (ref 4.8–10.8)

## 2023-10-21 PROCEDURE — 2580000003 HC RX 258: Performed by: NURSE PRACTITIONER

## 2023-10-21 PROCEDURE — 6360000002 HC RX W HCPCS: Performed by: EMERGENCY MEDICINE

## 2023-10-21 PROCEDURE — 36415 COLL VENOUS BLD VENIPUNCTURE: CPT

## 2023-10-21 PROCEDURE — 6370000000 HC RX 637 (ALT 250 FOR IP): Performed by: NURSE PRACTITIONER

## 2023-10-21 PROCEDURE — 83735 ASSAY OF MAGNESIUM: CPT

## 2023-10-21 PROCEDURE — 6360000002 HC RX W HCPCS: Performed by: PHARMACIST

## 2023-10-21 PROCEDURE — 95714 VEEG EA 12-26 HR UNMNTR: CPT

## 2023-10-21 PROCEDURE — 37799 UNLISTED PX VASCULAR SURGERY: CPT

## 2023-10-21 PROCEDURE — 94003 VENT MGMT INPAT SUBQ DAY: CPT

## 2023-10-21 PROCEDURE — 6370000000 HC RX 637 (ALT 250 FOR IP): Performed by: EMERGENCY MEDICINE

## 2023-10-21 PROCEDURE — 93306 TTE W/DOPPLER COMPLETE: CPT

## 2023-10-21 PROCEDURE — 6360000002 HC RX W HCPCS: Performed by: PHYSICIAN ASSISTANT

## 2023-10-21 PROCEDURE — 2500000003 HC RX 250 WO HCPCS: Performed by: INTERNAL MEDICINE

## 2023-10-21 PROCEDURE — 85027 COMPLETE CBC AUTOMATED: CPT

## 2023-10-21 PROCEDURE — 2700000000 HC OXYGEN THERAPY PER DAY

## 2023-10-21 PROCEDURE — 93010 ELECTROCARDIOGRAM REPORT: CPT | Performed by: INTERNAL MEDICINE

## 2023-10-21 PROCEDURE — 03HY32Z INSERTION OF MONITORING DEVICE INTO UPPER ARTERY, PERCUTANEOUS APPROACH: ICD-10-PCS | Performed by: INTERNAL MEDICINE

## 2023-10-21 PROCEDURE — 80048 BASIC METABOLIC PNL TOTAL CA: CPT

## 2023-10-21 PROCEDURE — 94761 N-INVAS EAR/PLS OXIMETRY MLT: CPT

## 2023-10-21 PROCEDURE — 82948 REAGENT STRIP/BLOOD GLUCOSE: CPT

## 2023-10-21 PROCEDURE — 2580000003 HC RX 258: Performed by: PHARMACIST

## 2023-10-21 PROCEDURE — 6360000002 HC RX W HCPCS: Performed by: NURSE PRACTITIONER

## 2023-10-21 PROCEDURE — 80185 ASSAY OF PHENYTOIN TOTAL: CPT

## 2023-10-21 PROCEDURE — 99291 CRITICAL CARE FIRST HOUR: CPT | Performed by: INTERNAL MEDICINE

## 2023-10-21 PROCEDURE — 2580000003 HC RX 258: Performed by: EMERGENCY MEDICINE

## 2023-10-21 PROCEDURE — 6370000000 HC RX 637 (ALT 250 FOR IP)

## 2023-10-21 PROCEDURE — 2000000000 HC ICU R&B

## 2023-10-21 PROCEDURE — 84484 ASSAY OF TROPONIN QUANT: CPT

## 2023-10-21 PROCEDURE — 99024 POSTOP FOLLOW-UP VISIT: CPT | Performed by: PHYSICIAN ASSISTANT

## 2023-10-21 PROCEDURE — 6360000002 HC RX W HCPCS

## 2023-10-21 RX ADMIN — FAMOTIDINE 20 MG: 20 TABLET ORAL at 21:04

## 2023-10-21 RX ADMIN — SODIUM CHLORIDE, PRESERVATIVE FREE 10 ML: 5 INJECTION INTRAVENOUS at 08:12

## 2023-10-21 RX ADMIN — INSULIN LISPRO 4 UNITS: 100 INJECTION, SOLUTION INTRAVENOUS; SUBCUTANEOUS at 12:05

## 2023-10-21 RX ADMIN — POTASSIUM CHLORIDE 20 MEQ: 29.8 INJECTION, SOLUTION INTRAVENOUS at 01:15

## 2023-10-21 RX ADMIN — LEVETIRACETAM 1000 MG: 100 INJECTION, SOLUTION INTRAVENOUS at 18:00

## 2023-10-21 RX ADMIN — BUMETANIDE 0.5 MG: 0.25 INJECTION INTRAMUSCULAR; INTRAVENOUS at 17:53

## 2023-10-21 RX ADMIN — INSULIN LISPRO 6 UNITS: 100 INJECTION, SOLUTION INTRAVENOUS; SUBCUTANEOUS at 17:54

## 2023-10-21 RX ADMIN — MODAFINIL 200 MG: 100 TABLET ORAL at 08:12

## 2023-10-21 RX ADMIN — INSULIN GLARGINE 5 UNITS: 100 INJECTION, SOLUTION SUBCUTANEOUS at 21:04

## 2023-10-21 RX ADMIN — ACETAMINOPHEN 650 MG: 650 SOLUTION ORAL at 05:19

## 2023-10-21 RX ADMIN — CHLORHEXIDINE GLUCONATE 0.12% ORAL RINSE 15 ML: 1.2 LIQUID ORAL at 08:12

## 2023-10-21 RX ADMIN — SODIUM CHLORIDE: 9 INJECTION, SOLUTION INTRAVENOUS at 10:55

## 2023-10-21 RX ADMIN — PHENYTOIN SODIUM 150 MG: 50 INJECTION INTRAMUSCULAR; INTRAVENOUS at 08:23

## 2023-10-21 RX ADMIN — CHLORHEXIDINE GLUCONATE 0.12% ORAL RINSE 15 ML: 1.2 LIQUID ORAL at 21:04

## 2023-10-21 RX ADMIN — INSULIN LISPRO 4 UNITS: 100 INJECTION, SOLUTION INTRAVENOUS; SUBCUTANEOUS at 21:04

## 2023-10-21 RX ADMIN — INSULIN LISPRO 6 UNITS: 100 INJECTION, SOLUTION INTRAVENOUS; SUBCUTANEOUS at 08:14

## 2023-10-21 RX ADMIN — PHENYTOIN SODIUM 50 MG: 50 INJECTION INTRAMUSCULAR; INTRAVENOUS at 08:23

## 2023-10-21 RX ADMIN — PIPERACILLIN AND TAZOBACTAM 3375 MG: 3; .375 INJECTION, POWDER, LYOPHILIZED, FOR SOLUTION INTRAVENOUS at 17:58

## 2023-10-21 RX ADMIN — FAMOTIDINE 20 MG: 20 TABLET ORAL at 08:12

## 2023-10-21 RX ADMIN — VANCOMYCIN HYDROCHLORIDE 1000 MG: 1 INJECTION, POWDER, LYOPHILIZED, FOR SOLUTION INTRAVENOUS at 10:56

## 2023-10-21 RX ADMIN — VANCOMYCIN HYDROCHLORIDE 1000 MG: 1 INJECTION, POWDER, LYOPHILIZED, FOR SOLUTION INTRAVENOUS at 23:10

## 2023-10-21 RX ADMIN — ACETAMINOPHEN 650 MG: 650 SOLUTION ORAL at 18:00

## 2023-10-21 RX ADMIN — PIPERACILLIN AND TAZOBACTAM 3375 MG: 3; .375 INJECTION, POWDER, LYOPHILIZED, FOR SOLUTION INTRAVENOUS at 02:18

## 2023-10-21 RX ADMIN — SODIUM CHLORIDE, PRESERVATIVE FREE 10 ML: 5 INJECTION INTRAVENOUS at 21:04

## 2023-10-21 RX ADMIN — LEVETIRACETAM 1000 MG: 100 INJECTION, SOLUTION INTRAVENOUS at 05:30

## 2023-10-21 RX ADMIN — POTASSIUM CHLORIDE 20 MEQ: 29.8 INJECTION, SOLUTION INTRAVENOUS at 02:29

## 2023-10-21 RX ADMIN — PIPERACILLIN AND TAZOBACTAM 3375 MG: 3; .375 INJECTION, POWDER, LYOPHILIZED, FOR SOLUTION INTRAVENOUS at 09:46

## 2023-10-21 RX ADMIN — POTASSIUM CHLORIDE 20 MEQ: 29.8 INJECTION, SOLUTION INTRAVENOUS at 00:11

## 2023-10-21 RX ADMIN — PHENYTOIN SODIUM 150 MG: 50 INJECTION INTRAMUSCULAR; INTRAVENOUS at 21:09

## 2023-10-21 ASSESSMENT — PULMONARY FUNCTION TESTS
PIF_VALUE: 17
PIF_VALUE: 17
PIF_VALUE: 18
PIF_VALUE: 20
PIF_VALUE: 17
PIF_VALUE: 19

## 2023-10-21 NOTE — PLAN OF CARE
Problem: Respiratory - Adult  Goal: Normal spontaneous ventilation  10/21/2023 0839 by Rosey Smallwood RCP  Outcome: Progressing     Problem: Respiratory - Adult  Goal: Achieves optimal ventilation and oxygenation  10/21/2023 0839 by Rosey Smallwood RCP  Outcome: Progressing   Patient remains on the ventilator, continuing to wean as tolerated. Patient Weaning Progress    The patient's vent settings was able to be weaned this shift. Will continue SBT as long as patient tolerates. Ventilator settings that were weaned              [] Mode   [x] Pressure support weaned   [] Fio2 weaned   [] Peep weaned      Spontaneous weaning trial  was attempted. Reason that defined ventilator parameters for SBT was not met              [] Patient condition requires increased ventilator settings  [] Requires increased sedation   [] Settings not within weaning range   [] SAT not completed   [] Physician orders    Unable to get agreement for goals because no family is present and patient cannot respond.

## 2023-10-21 NOTE — PROCEDURES
10/20/23, starting at 10:37    Interictal EEG Samples: At the start of the recording, the background was comprised of disorganized, attenuated, primarily delta with at times, overriding faster frequencies. There was no discernable PDR. There was evidence of variability and reactivity. Spontaneous state changes were seen with a predominantly attenuated quiet state and a higher amplitude active state with a faster frequency admixture. Sleep architecture was not yet visualized. There were frequent to at times near continuous runs of up to 1.5 Hz generalized periodic discharges (GPDs) with triphasic morphology, without evolution or associated clinical symptoms. These discharges were more prominent and more often seen in the active state, and were activated with stimulation, consistent with stimulus induced, rhythmic, periodic or ictal discharges (SIRPIDs). Then, as the recording progressed, the background became more attenuated and remained in primarily the quiet state. The GPDs as described above were less frequently seen. With photic stimulation, a sustained driving response was not seen. The EKG channel revealed no abnormalities. Ictal EEG Recording / Patient Events: During this period the patient had no events or seizures. Summary: During this day of recording no events were recorded. The interictal EEG was abnormal due to:  -Diffuse background slowing and disorganization  -Frequent to at times near continuous GPD's with triphasic morphology, activated with stimulation, consistent with SIRPIDs, improving towards the end of the recording    The background abnormalities indicated a moderate to severe encephalopathy, nonspecific to etiology. The GPDs with triphasic morphology  indicated underlying cortical irritability/increased risk of seizures, and can be seen in various encephalopathies.  SIRPIDs generally have an unclear clinical significance and although not considered an ictal pattern in isolation, can be seen in patients with prior seizures or increased propensity for seizures. Monitoring was continued in order to evaluate for seizures. The EKG channel revealed no abnormalities. Please note this is a preliminary report and updated daily. The final report will have a summary of behavior and electrographic findings with clinical correlation. Pam Patel DO  Neurology/Epilepsy     Day 2 - 10/21/23    Interictal EEG Samples: The background was comprised of disorganized, attenuated, primarily delta with at times, overriding faster frequencies. There was no discernable PDR. There was evidence of variability and reactivity. Spontaneous state changes were seen with a predominantly attenuated quiet state and a higher amplitude active state with a faster frequency admixture. The majority of the record was in the quiet state. Sleep architecture was not yet visualized. The previously seen GPDs with triphasic morphology were rarely seen. SIRPIDs were no longer visualized. The EKG channel revealed no abnormalities. Ictal EEG Recording / Patient Events: During this period the patient had no events or seizures. Summary: During this day of recording no events were recorded. The interictal EEG was abnormal due to:  -Diffuse background slowing and disorganization  -Rare GPDs with triphasic morphology    The background abnormalities indicated a moderate to severe encephalopathy, nonspecific to etiology. The GPDs with triphasic morphology  indicated underlying cortical irritability/increased risk of seizures, and can be seen in various encephalopathies, and were overall less frequently seen in comparison to the previous day. SIRPIDs were no longer seen.     This study thus far is similar-mildly improved compared to an EEG done on 10/19/2023 but is significantly improved from the original study done on 10/17/2023 due to more variability, evidence of reactivity and state changes seen on this study which were not present on the study on 10/17/2023. Findings discussed with Neurology attending, Dr. Dianne Roberts. Monitoring was continued in order to evaluate for seizures. The EKG channel revealed no abnormalities. Please note this is a preliminary report and updated daily. The final report will have a summary of behavior and electrographic findings with clinical correlation.     Elier Robison, DO  Neurology/Epilepsy

## 2023-10-21 NOTE — PROGRESS NOTES
1930 Propofol turned off due to BP dropping from map of 67 at 1900 to now 59/33 with map of 40.     1935 Dr. Jenelle Garcia notified that pts BP is now 49/29 with map 33. Dr. Jenelle Garcia at bedside, see orders for Levophed and fluid bolus. 1940 Levo started at 5 mcg/min    1945 Dr. Jenelle Garcia at bedside, Levo up to 10 mcg/min, BP 50/24 map 32    1950 Fluid bolus started, BP 56/25 map 32    1955 Dr. Jenelle Garcia at bedside, Levo up to 15 mcg/min, BP 77/42 map 54    2010 Dr. Jenelle Garcia notified BP 97/73 map 77 with Levo at 15 mcg/min. Orders to continue to hold propofol at this time. 2055 Levo down to 13 mcg/min, BP 93/63 with map 69, fluids bolus completed. 2058 Notified Dr. Jenelle aGrcia of temp 38.8, tylenol given and ice packs applied. 2100 BP 48/22 map 31, Levo 15 mcg/min, notified Dr. Jenelle Garcia. Dr. Min Mock and Dr. Jenelle Garcia at bedside. 2105 BP 57/29 map 36, Levo up to 20 mcg/min. 2108 BP 74/40 map 51, Levo up to 25 mcg/min. Vasopressin ordered. 2110 Stat labs ordered, Dr. Min Mock at bedside to place art line. Informed Dr. Jenelle Garcia of irregular HR (down from 91 at 2055 to 59 at 2058). EKG and CXR ordered as well. 65 Notified Dr. Jenelle Garcia of BP improving, Levo down to 10 mcg/min, and temp improving and down to 38. 1.     0430 Notified Dr. Jenelle Garcia that pt has been weaned off levo, /67 (80)    0500 Notified Dr. Jenelle Garcia of increasing temperature 38.3, tylenol given and ice packs applied.

## 2023-10-21 NOTE — PLAN OF CARE
Problem: Discharge Planning  Goal: Discharge to home or other facility with appropriate resources  10/21/2023 1002 by Cm Muller RN  Outcome: Not Progressing  Flowsheets (Taken 10/21/2023 1002)  Discharge to home or other facility with appropriate resources:   Identify discharge learning needs (meds, wound care, etc)   Identify barriers to discharge with patient and caregiver   Refer to discharge planning if patient needs post-hospital services based on physician order or complex needs related to functional status, cognitive ability or social support system  10/21/2023 0354 by Edy Newsome RN  Outcome: Not Progressing  Flowsheets (Taken 10/21/2023 0354)  Discharge to home or other facility with appropriate resources:   Identify barriers to discharge with patient and caregiver   Identify discharge learning needs (meds, wound care, etc)   Refer to discharge planning if patient needs post-hospital services based on physician order or complex needs related to functional status, cognitive ability or social support system   Arrange for needed discharge resources and transportation as appropriate       Problem: Chronic Conditions and Co-morbidities  Goal: Patient's chronic conditions and co-morbidity symptoms are monitored and maintained or improved  10/21/2023 1002 by Cm Muller RN  Outcome: Progressing  Flowsheets (Taken 10/21/2023 1002)  Care Plan - Patient's Chronic Conditions and Co-Morbidity Symptoms are Monitored and Maintained or Improved:   Monitor and assess patient's chronic conditions and comorbid symptoms for stability, deterioration, or improvement   Collaborate with multidisciplinary team to address chronic and comorbid conditions and prevent exacerbation or deterioration   Update acute care plan with appropriate goals if chronic or comorbid symptoms are exacerbated and prevent overall improvement and discharge  10/21/2023 0354 by Edy Newsome RN  Outcome: Progressing  Flowsheets

## 2023-10-21 NOTE — PROGRESS NOTES
CRITICAL CARE PROGRESS NOTE      Patient:  Calvin Lamas    Unit/Bed:4D-08/008-A  YOB: 1950  MRN: [id number]   PCP: Anette Dias MD  Date of Admission: 10/12/2023  Chief Complaint:- tracheal stenosis s/p resection    Assessment and Plan:    Anoxic Brain Injury:  2/2 respiratory arrest and subsequent cardiac arrest following failed extubation 10/16   EEG 10/17: diffuse background attenuation and suppression w/out evidence of reactivity. No seizures.    CT Head 10/17: no acute findings   MRI Brain 10/17: no acute findings   MRI Brain 10/19: No evidence of anoxia, no evidence of acute infarct   EEG 10/19: No seizures, moderate to severe encephalopathy   Continuous EEG starting 10/20   3% @ 15mL/hr with goal Na 145-150 for concerns of cerebral edema, HELD 10/20   Neuro following, started Provigil 200mg   Seizure prophylaxis with Keppra and Dilantin added by neurology   On zosyn and vancomycin for surgical site infection prophylaxis   S/p cricotracheal resection, laryngoplasty with flap and vocal fold lateralization stitch placement: with Dr. Lit Carballo   POD 9  HOB at 30 degrees with pillow under head for semi-flexed position   Monitor RACHEL drains, 20mL output last 24 hrs   10/18: 5.0 ET tube exchanged for 7.0 ET tube by Dr. Lit Carballo   Bilateral DVTs, superficial:    Venous Duplex Lower Extremity 10/12: thrombus in BL posterior tibial veins and right peroneal vein   Patient off of heparin and started on Lovenox  Lovenox held by ENT due to blood around surgical site 10/20  DM Type 2:  01/2023 HbA1c: 5.5   Patient started on 5 units Lantus nightly   Medium dose SSI   Chronic diastolic CHF:  History   ECHO 1/2/23: EF 60-65%  Strict I/O   Monitor for fluid overload  10/21: CXR: Worsening interstitial edema, right perihilar subsegmental atelectasis   Patient started on Bumex 0.5mg   HTN:   Amlodipine 5mg daily, held   Carvedilol 6.25mg BID, held   Lopressor 5mg q4hr PRN for SBP > 160    INITIAL H for OR today with Dr Sol Reeder. 10/19: Yesterday Dr Sol Reeder exchanged her 5 ETT for a 7 ETT, minimal EBL. No acute events over night. \"    10/20: No acute events overnight. Patient is withdrawing her lower extremities to pain. Patient does not withdrawal to pain in the upper extremities. 10/21: Overnight the patient became hypotensive and required levophed. See Dr. Kenna Chun significant event note for further details. This morning the patient is off of levophed. Her neuro exam is unchanged from yesterday. Yesterday there was some seizure like activity in her upper extremities so she was started on propofol. When the patient went hypotensive yesterday evening the propofol was stopped. The patient has not had any seizure like activity since the propofol was stopped. Past Medical History:  Obtained from chart review due to patient being intubated. Arthritis, CAD, diabetic neuropathy, DVT, hyperlipidemia, hypertension, type 2 diabetes. Family History: Father: Parkinson's disease, lung cancer. Social History: Denies any smoking history, no recent alcohol use. ROS   Unable to be obtained due to patient being intubated.      Scheduled Meds:   phenytoin  150 mg IntraVENous Q12H    phenytoin  50 mg IntraVENous Daily    insulin lispro  0-8 Units SubCUTAneous TID WC    insulin lispro  0-4 Units SubCUTAneous Nightly    insulin glargine  5 Units SubCUTAneous Nightly    [Held by provider] carvedilol  6.25 mg Orogastric BID WC    [Held by provider] amLODIPine  5 mg Orogastric Daily    bumetanide  0.5 mg IntraVENous Q24H    famotidine  20 mg Oral BID    [Held by provider] enoxaparin  1 mg/kg SubCUTAneous BID    modafinil  200 mg Oral Daily    scopolamine  1 patch TransDERmal Q72H    levETIRAcetam  1,000 mg IntraVENous Q12H    vancomycin  1,000 mg IntraVENous Q12H    sodium chloride flush  5-40 mL IntraVENous 2 times per day    chlorhexidine  15 mL Mouth/Throat BID    piperacillin-tazobactam  3,375 mg

## 2023-10-21 NOTE — PROGRESS NOTES
Neurology Progress Note    Date:10/21/2023       Room:[facility]08/008-A  Patient Name:Kaleigh Neves Case     YOB: 1950     Age:72 y.o. Chief Complaint: tracheal stenosis      Subjective     Manny Garcias is a 81 y.o. female with a history of coronary artery disease, hyperlipidemia, type 2 diabetes mellitus, provoked DVT/PE after hip surgery, CHFOK-25 infection complicated by 2 weeks of ventilator support and tracheostomy dependence who presents to [facility] on 10/12/2023 for ENT consultation for a segmental cricoid tracheal resection and removal of her tracheostomy. On 10/15/2023 she was placed on spontaneous breathing trial for 5 minutes and was able to tolerate this. On 10/16/2023 she failed her spontaneous breathing trial and had to go back on AC/PC due to going apneic in the morning but then later in the morning she tolerated SBT and was extubated around 1236 on 10/16/23 to BIPAP. After 2 minutes she stated she could not breathe and her oxygen saturation dropped to 53%. Her BiPAP settings were increased and primary team was made aware of need for reintubation. By the time the primary team arrived she was hypoxic and receiving CPR as she went into cardiac arrest secondary to respiratory arrest. Due to the complexity of her airway, it took multiple attempts to secure her airway with ETT. She has been off sedation since 0711 on 10/17/2023. EEG is showing no seizure activity and minimal brain activity. She reportedly had seizure-like activity after the code yesterday, but no further seizure activity noted. She currently is not following commands on exam.  She does withdraw/posture to painful stimulation. Interval history 10/18/23:  No acute events overnight. She is still withdrawing to pain. Not following commands on exam. She remains intubated and off sedation. Interval history 10/19/23: The patient remains intubated. Per nursing staff, she was noted to have a cough and a gag. inherent in voice recognition technology. ** Final report electronically signed by Dr. Jalen Madsen on 10/14/2023 3:11 PM    XR CHEST PORTABLE    Result Date: 10/13/2023  PROCEDURE: XR CHEST PORTABLE CLINICAL INFORMATION: Retraction of ETT COMPARISON: 10/12/2023 TECHNIQUE: AP portable chest radiograph performed. 1. The tip of the endotracheal tube terminates 1.3 cm above the telma. 2. Small left pleural effusion. 3. Left retrocardiac opacity seen that can relate to atelectasis and/or infiltrate. 4. Otherwise, there has been no other significant interval change in the radiographic appearance of the chest  from chest radiograph 10/12/2023 at 1704 hours. **This report has been created using voice recognition software. It may contain minor errors which are inherent in voice recognition technology. ** Final report electronically signed by Dr Andrew Birmingham on 10/13/2023 9:58 AM    XR CHEST PORTABLE    Result Date: 10/13/2023  PROCEDURE: XR CHEST PORTABLE CLINICAL INFORMATION: endotracheal intubation. COMPARISON: Plain radiographs dated 2/2/2023. Fariba Zeng TECHNIQUE: AP upright view of the chest. FINDINGS: There is an endotracheal tube which is heading down the right mainstem bronchus. The previously noted tracheostomy tube appears not seen. There is an enteric tube with the tip in the stomach. The heart size is normal.The mediastinum is widened. There is diffuse COPD with superimposed abnormal density in the left lower lobe consistent with infiltrate and effusion. The pulmonary vascularity is normal. There is thoracic spondylosis. 1. The tip of the endotracheal tube is in the right mainstem bronchus and needs be pulled back. 2. Enteric tube in place. 3. Diffuse COPD with superimposed left lower lobe infiltrate and effusion. ..  4. Thoracic spondylosis. **This report has been created using voice recognition software. It may contain minor errors which are inherent in voice recognition technology. ** Final report

## 2023-10-21 NOTE — PLAN OF CARE
Notified by RN that patient has been weaned off of Levophed. Will discontinue vasopressin order at this time. Will keep Levophed order in place in case of need to restart Levophed.     Electronically signed by Saroj Ramos DO on 10/21/2023 at 3:59 AM

## 2023-10-21 NOTE — PROCEDURES
Arterial Line: Left radial approach    Indication: Hypertension, suspected short, s/p cardiac arrest    Complications: None    EBL: Less than 3 mL    Procedure:  After informed consent was obtained, the left radial approach was utilized. US POCUS utilized throughout the procedure. Seen both ulnar and radial arteries are patent and pulsatile. Arterial pulsation was identified. Patient subsequently was prepped and draped in sterile fashion utilizing chlorhexidine prep, sterile gown, sterile gloves, mask, hair net, and sterile whole-body drape. Patient received 2 cc of local anesthesia with lidocaine. Introducer needle was advanced subcutaneously until arterial flow was obtained. Utilizing modified Seldinger technique, guidewire was placed through the introducer needle. Introducer needle was withdrawn leaving the guidewire in place. Dilator was placed over the guidewire and removed. Arterial catheter was placed into the lumen. Guidewire removed. Secondary cleansing at the site was obtained with chlorhexidine. Catheter was secured to the skin utilizing suture #2. Tegaderm applied. Connected to system. Obtained appropriate art line  Performed by Dr Christi Vivar PGY-3 .   Dr Chun Willingham is present at bedside

## 2023-10-21 NOTE — PLAN OF CARE
Prelim EEG report, full note to follow    Reviewed through 07:34:    -Overall more attenuated recording primarily in quiet state but preserved reactivity and state changes, still consistent with moderate-severe encephalopathy  -Fewer GPDs seen later in the recording yesterday and into today  -No seizures    Please call if any questions.     Preethi Jordan, DO  Neurology/Epilepsy

## 2023-10-21 NOTE — PLAN OF CARE
Problem: Chronic Conditions and Co-morbidities  Goal: Patient's chronic conditions and co-morbidity symptoms are monitored and maintained or improved  Outcome: Progressing  Flowsheets (Taken 10/21/2023 0354)  Care Plan - Patient's Chronic Conditions and Co-Morbidity Symptoms are Monitored and Maintained or Improved:   Monitor and assess patient's chronic conditions and comorbid symptoms for stability, deterioration, or improvement   Collaborate with multidisciplinary team to address chronic and comorbid conditions and prevent exacerbation or deterioration   Update acute care plan with appropriate goals if chronic or comorbid symptoms are exacerbated and prevent overall improvement and discharge     Problem: Safety - Adult  Goal: Free from fall injury  Outcome: Progressing  Flowsheets (Taken 10/21/2023 0354)  Free From Fall Injury:   Based on caregiver fall risk screen, instruct family/caregiver to ask for assistance with transferring infant if caregiver noted to have fall risk factors   Instruct family/caregiver on patient safety     Problem: Pain  Goal: Verbalizes/displays adequate comfort level or baseline comfort level  Outcome: Progressing  Flowsheets (Taken 10/21/2023 0354)  Verbalizes/displays adequate comfort level or baseline comfort level:   Encourage patient to monitor pain and request assistance   Administer analgesics based on type and severity of pain and evaluate response   Consider cultural and social influences on pain and pain management   Assess pain using appropriate pain scale   Notify Licensed Independent Practitioner if interventions unsuccessful or patient reports new pain     Problem: Respiratory - Adult  Goal: Normal spontaneous ventilation  10/20/2023 1854 by Irene Esparza RCP  Outcome: Progressing  Goal: Achieves optimal ventilation and oxygenation  10/21/2023 0354 by Robert Ghosh RN  Outcome: Progressing  Flowsheets (Taken 10/21/2023 0354)  Achieves optimal ventilation and cerebral perfusion and minimize risk of hemorrhage   Monitor temperature, glucose, and sodium.  Initiate appropriate interventions as ordered  Goal: Absence of seizures  Outcome: Progressing     Problem: Skin/Tissue Integrity - Adult  Goal: Incisions, wounds, or drain sites healing without S/S of infection  Outcome: Progressing  Flowsheets (Taken 10/21/2023 0354)  Incisions, Wounds, or Drain Sites Healing Without Sign and Symptoms of Infection: ADMISSION and DAILY: Assess and document risk factors for pressure ulcer development     Problem: Musculoskeletal - Adult  Goal: Maintain proper alignment of affected body part  Outcome: Progressing  Flowsheets (Taken 10/21/2023 0354)  Maintain proper alignment of affected body part:   Support and protect limb and body alignment per provider's orders   Instruct and reinforce with patient and family use of appropriate assistive device and precautions (e.g. spinal or hip dislocation precautions)     Problem: Gastrointestinal - Adult  Goal: Minimal or absence of nausea and vomiting  Outcome: Progressing  Flowsheets (Taken 10/21/2023 0354)  Minimal or absence of nausea and vomiting:   Administer IV fluids as ordered to ensure adequate hydration   Nasogastric tube to low intermittent suction as ordered   Provide nonpharmacologic comfort measures as appropriate   Nutrition consult to assist patient with adequate nutrition and appropriate food choices  Goal: Maintains adequate nutritional intake  Outcome: Progressing  Flowsheets (Taken 10/21/2023 0354)  Maintains adequate nutritional intake:   Monitor intake and output, weight and lab values   Obtain nutritional consult as needed     Problem: Genitourinary - Adult  Goal: Urinary catheter remains patent  Outcome: Progressing  Flowsheets (Taken 10/21/2023 0354)  Urinary catheter remains patent:   Assess patency of urinary catheter   Irrigate catheter per Licensed Independent Practitioner order if indicated and notify Licensed

## 2023-10-21 NOTE — PLAN OF CARE
Problem: Respiratory - Adult  Goal: Normal spontaneous ventilation  10/21/2023 0514 by Too Esquivel RCP  Outcome: Progressing                                                Patient Weaning Progress    The patient's vent settings was able to be weaned this shift. Ventilator settings that were weaned              [x] Mode   [] Pressure support weaned   [] Fio2 weaned   [] Peep weaned      Spontaneous weaning trial  was attempted. due to defined parameters for SBT (spontaneous breathing trial) not being met. Reason that defined ventilator parameters for SBT was not met              [] Patient condition requires increased ventilator settings  [] Requires increased sedation   [] Settings not within weaning range   [] SAT not completed   [] Physician orders    The patient was able to tolerate SBT. RSBI  was 43 with EtCO2 of 30 and SpO2 of 96 on 30% FiO2. Spontanteous VT was 400 and RR 17 breaths/min. The patient has been on SBT for 10 minutes and continues tolerate well. Evac tube was not  hooked up with continuous low suction(20-30mmHg)      Cuff was not  deflated to determine cuff leak. Unable to get agreement for goals because no family is present and patient cannot respond. No

## 2023-10-21 NOTE — SIGNIFICANT EVENT
ICU Significant Event Note  Patient had previously been doing well in regards to BP and Levophed had been decreased to 13 mcg/min. Notified by RN at approximately 2100 that after completing fluid bolus, BP dropped again with MAP 31. Levophed increased to 25 mcg/min. STAT BMP, H/H, lactic acid, troponin, EKG, CXR ordered. Vasopressin ordered. Dr. Amanda Toro at bedside to assist. Dr. Amanda Toro placed Arterial line for better BP monitoring. Bedside POCUS echo shows small pericardial effusion, however EF normal and no signs of tamponade physiology. No other abnormalities noted. BMP shows potassium 2.9. Magnesium 1.6. Lactic acid 1.8. Hgb 8.6, stable from previous. EKG unchanged from previous. CXR shows no significant interval change. Troponin pending. Ionized calcium ordered due to low serum calcium (7.4). Replacements for potassium and magnesium ordered. Echo ordered for in the AM to further evaluate heart s/p cardiac arrest and pericardial effusion. After placement of arterial line, patient's BP improving. Levophed decreased to 20 mcg/min. Vasopressin was ordered as above, however has not been started. Will continue to monitor throughout night.     Electronically signed by Talib Cano DO on 10/20/2023 at 10:37 PM

## 2023-10-22 LAB
ANION GAP SERPL CALC-SCNC: 11 MEQ/L (ref 8–16)
BUN SERPL-MCNC: 21 MG/DL (ref 7–22)
CALCIUM SERPL-MCNC: 8.1 MG/DL (ref 8.5–10.5)
CHLORIDE SERPL-SCNC: 113 MEQ/L (ref 98–111)
CO2 SERPL-SCNC: 21 MEQ/L (ref 23–33)
CREAT SERPL-MCNC: 0.6 MG/DL (ref 0.4–1.2)
DEPRECATED RDW RBC AUTO: 52 FL (ref 35–45)
ERYTHROCYTE [DISTWIDTH] IN BLOOD BY AUTOMATED COUNT: 15.2 % (ref 11.5–14.5)
GFR SERPL CREATININE-BSD FRML MDRD: > 60 ML/MIN/1.73M2
GLUCOSE BLD STRIP.AUTO-MCNC: 276 MG/DL (ref 70–108)
GLUCOSE BLD STRIP.AUTO-MCNC: 288 MG/DL (ref 70–108)
GLUCOSE BLD STRIP.AUTO-MCNC: 292 MG/DL (ref 70–108)
GLUCOSE SERPL-MCNC: 241 MG/DL (ref 70–108)
HCT VFR BLD AUTO: 24.9 % (ref 37–47)
HGB BLD-MCNC: 7.7 GM/DL (ref 12–16)
MAGNESIUM SERPL-MCNC: 1.9 MG/DL (ref 1.6–2.4)
MCH RBC QN AUTO: 29.2 PG (ref 26–33)
MCHC RBC AUTO-ENTMCNC: 30.9 GM/DL (ref 32.2–35.5)
MCV RBC AUTO: 94.3 FL (ref 81–99)
PLATELET # BLD AUTO: 194 THOU/MM3 (ref 130–400)
PMV BLD AUTO: 10.7 FL (ref 9.4–12.4)
POTASSIUM SERPL-SCNC: 3.6 MEQ/L (ref 3.5–5.2)
RBC # BLD AUTO: 2.64 MILL/MM3 (ref 4.2–5.4)
SODIUM SERPL-SCNC: 145 MEQ/L (ref 135–145)
WBC # BLD AUTO: 12.8 THOU/MM3 (ref 4.8–10.8)

## 2023-10-22 PROCEDURE — 2580000003 HC RX 258: Performed by: EMERGENCY MEDICINE

## 2023-10-22 PROCEDURE — 2580000003 HC RX 258: Performed by: PHARMACIST

## 2023-10-22 PROCEDURE — 6370000000 HC RX 637 (ALT 250 FOR IP)

## 2023-10-22 PROCEDURE — 99232 SBSQ HOSP IP/OBS MODERATE 35: CPT | Performed by: INTERNAL MEDICINE

## 2023-10-22 PROCEDURE — 85027 COMPLETE CBC AUTOMATED: CPT

## 2023-10-22 PROCEDURE — 83735 ASSAY OF MAGNESIUM: CPT

## 2023-10-22 PROCEDURE — 2000000000 HC ICU R&B

## 2023-10-22 PROCEDURE — 6370000000 HC RX 637 (ALT 250 FOR IP): Performed by: NURSE PRACTITIONER

## 2023-10-22 PROCEDURE — 80048 BASIC METABOLIC PNL TOTAL CA: CPT

## 2023-10-22 PROCEDURE — 6360000002 HC RX W HCPCS: Performed by: EMERGENCY MEDICINE

## 2023-10-22 PROCEDURE — 2500000003 HC RX 250 WO HCPCS: Performed by: INTERNAL MEDICINE

## 2023-10-22 PROCEDURE — 6370000000 HC RX 637 (ALT 250 FOR IP): Performed by: EMERGENCY MEDICINE

## 2023-10-22 PROCEDURE — 94761 N-INVAS EAR/PLS OXIMETRY MLT: CPT

## 2023-10-22 PROCEDURE — 6360000002 HC RX W HCPCS: Performed by: NURSE PRACTITIONER

## 2023-10-22 PROCEDURE — 37799 UNLISTED PX VASCULAR SURGERY: CPT

## 2023-10-22 PROCEDURE — 94003 VENT MGMT INPAT SUBQ DAY: CPT

## 2023-10-22 PROCEDURE — 2700000000 HC OXYGEN THERAPY PER DAY

## 2023-10-22 PROCEDURE — 6360000002 HC RX W HCPCS: Performed by: PHARMACIST

## 2023-10-22 PROCEDURE — 2580000003 HC RX 258: Performed by: NURSE PRACTITIONER

## 2023-10-22 PROCEDURE — 82948 REAGENT STRIP/BLOOD GLUCOSE: CPT

## 2023-10-22 PROCEDURE — 36415 COLL VENOUS BLD VENIPUNCTURE: CPT

## 2023-10-22 PROCEDURE — 99024 POSTOP FOLLOW-UP VISIT: CPT | Performed by: PHYSICIAN ASSISTANT

## 2023-10-22 PROCEDURE — 6360000002 HC RX W HCPCS: Performed by: PHYSICIAN ASSISTANT

## 2023-10-22 RX ORDER — INSULIN LISPRO 100 [IU]/ML
0-8 INJECTION, SOLUTION INTRAVENOUS; SUBCUTANEOUS EVERY 6 HOURS
Status: DISCONTINUED | OUTPATIENT
Start: 2023-10-22 | End: 2023-10-23

## 2023-10-22 RX ADMIN — FAMOTIDINE 20 MG: 20 TABLET ORAL at 20:34

## 2023-10-22 RX ADMIN — LEVETIRACETAM 1000 MG: 100 INJECTION, SOLUTION INTRAVENOUS at 17:41

## 2023-10-22 RX ADMIN — CHLORHEXIDINE GLUCONATE 0.12% ORAL RINSE 15 ML: 1.2 LIQUID ORAL at 20:34

## 2023-10-22 RX ADMIN — INSULIN GLARGINE 5 UNITS: 100 INJECTION, SOLUTION SUBCUTANEOUS at 20:34

## 2023-10-22 RX ADMIN — CHLORHEXIDINE GLUCONATE 0.12% ORAL RINSE 15 ML: 1.2 LIQUID ORAL at 07:46

## 2023-10-22 RX ADMIN — PHENYTOIN SODIUM 150 MG: 50 INJECTION INTRAMUSCULAR; INTRAVENOUS at 20:35

## 2023-10-22 RX ADMIN — INSULIN LISPRO 4 UNITS: 100 INJECTION, SOLUTION INTRAVENOUS; SUBCUTANEOUS at 17:44

## 2023-10-22 RX ADMIN — PIPERACILLIN AND TAZOBACTAM 3375 MG: 3; .375 INJECTION, POWDER, LYOPHILIZED, FOR SOLUTION INTRAVENOUS at 02:08

## 2023-10-22 RX ADMIN — MODAFINIL 200 MG: 100 TABLET ORAL at 07:46

## 2023-10-22 RX ADMIN — INSULIN LISPRO 4 UNITS: 100 INJECTION, SOLUTION INTRAVENOUS; SUBCUTANEOUS at 07:46

## 2023-10-22 RX ADMIN — VANCOMYCIN HYDROCHLORIDE 1000 MG: 1 INJECTION, POWDER, LYOPHILIZED, FOR SOLUTION INTRAVENOUS at 11:16

## 2023-10-22 RX ADMIN — LEVETIRACETAM 1000 MG: 100 INJECTION, SOLUTION INTRAVENOUS at 05:58

## 2023-10-22 RX ADMIN — SODIUM CHLORIDE: 9 INJECTION, SOLUTION INTRAVENOUS at 18:08

## 2023-10-22 RX ADMIN — SODIUM CHLORIDE, PRESERVATIVE FREE 10 ML: 5 INJECTION INTRAVENOUS at 20:34

## 2023-10-22 RX ADMIN — PHENYTOIN SODIUM 150 MG: 50 INJECTION INTRAMUSCULAR; INTRAVENOUS at 09:08

## 2023-10-22 RX ADMIN — INSULIN LISPRO 4 UNITS: 100 INJECTION, SOLUTION INTRAVENOUS; SUBCUTANEOUS at 11:20

## 2023-10-22 RX ADMIN — SODIUM CHLORIDE, PRESERVATIVE FREE 10 ML: 5 INJECTION INTRAVENOUS at 07:47

## 2023-10-22 RX ADMIN — FAMOTIDINE 20 MG: 20 TABLET ORAL at 07:46

## 2023-10-22 RX ADMIN — PIPERACILLIN AND TAZOBACTAM 3375 MG: 3; .375 INJECTION, POWDER, LYOPHILIZED, FOR SOLUTION INTRAVENOUS at 17:46

## 2023-10-22 RX ADMIN — PHENYTOIN SODIUM 50 MG: 50 INJECTION INTRAMUSCULAR; INTRAVENOUS at 09:08

## 2023-10-22 RX ADMIN — BUMETANIDE 0.5 MG: 0.25 INJECTION INTRAMUSCULAR; INTRAVENOUS at 17:44

## 2023-10-22 RX ADMIN — PIPERACILLIN AND TAZOBACTAM 3375 MG: 3; .375 INJECTION, POWDER, LYOPHILIZED, FOR SOLUTION INTRAVENOUS at 09:31

## 2023-10-22 ASSESSMENT — PULMONARY FUNCTION TESTS
PIF_VALUE: 15
PIF_VALUE: 17
PIF_VALUE: 15
PIF_VALUE: 16
PIF_VALUE: 17
PIF_VALUE: 17
PIF_VALUE: 19

## 2023-10-22 NOTE — PROGRESS NOTES
Neurology Progress Note    Date:10/22/2023       Room:[facility]08/008-A  Patient Name:Kaleigh Ballard     YOB: 1950     Age:72 y.o. Chief Complaint: tracheal stenosis      Subjective     Deja Hunter is a 81 y.o. female with a history of coronary artery disease, hyperlipidemia, type 2 diabetes mellitus, provoked DVT/PE after hip surgery, [id number] infection complicated by 2 weeks of ventilator support and tracheostomy dependence who presents to [address] [place] [address] on 10/12/2023 for ENT consultation for a segmental cricoid tracheal resection and removal of her tracheostomy. On 10/15/2023 she was placed on spontaneous breathing trial for 5 minutes and was able to tolerate this. On 10/16/2023 she failed her spontaneous breathing trial and had to go back on AC/PC due to going apneic in the morning but then later in the morning she tolerated SBT and was extubated around 1236 on 10/16/23 to BIPAP. After 2 minutes she stated she could not breathe and her oxygen saturation dropped to 53%. Her BiPAP settings were increased and primary team was made aware of need for reintubation. By the time the primary team arrived she was hypoxic and receiving CPR as she went into cardiac arrest secondary to respiratory arrest. Due to the complexity of her airway, it took multiple attempts to secure her airway with ETT. She has been off sedation since 0711 on 10/17/2023. EEG is showing no seizure activity and minimal brain activity. She reportedly had seizure-like activity after the code yesterday, but no further seizure activity noted. She currently is not following commands on exam.  She does withdraw/posture to painful stimulation. Interval history 10/18/23:  No acute events overnight. She is still withdrawing to pain. Not following commands on exam. She remains intubated and off sedation. Interval history 10/19/23: The patient remains intubated. Per nursing staff, she was noted to have a cough and a gag. replacement **OR** potassium chloride, metoprolol, potassium chloride **OR** potassium chloride, LORazepam, sodium chloride flush, sodium chloride, polyethylene glycol, ondansetron **OR** ondansetron, HYDROmorphone **OR** HYDROmorphone, acetaminophen, oxyCODONE **OR** oxyCODONE, glucose, dextrose bolus **OR** dextrose bolus, glucagon (rDNA), dextrose  Medications Prior to Admission:   No current facility-administered medications on file prior to encounter. Current Outpatient Medications on File Prior to Encounter   Medication Sig Dispense Refill    acetaminophen (TYLENOL) 500 MG tablet Take 1 tablet by mouth every 6 hours as needed for Pain      CALCIUM GLUCONATE PO Take 600 mg by mouth 2 times daily      SENNA CO by Combination route (Patient not taking: Reported on 10/9/2023)      lipase-protease-amylase (CREON) 84193-43717 units delayed release capsule Take 1 capsule by mouth 3 times daily (with meals)      tiZANidine (ZANAFLEX) 4 MG tablet  (Patient not taking: Reported on 10/9/2023)      diclofenac sodium (VOLTAREN) 1 % GEL Apply 2 g topically 4 times daily as needed for Pain (Patient not taking: Reported on 10/9/2023) 150 g 3    sucralfate (CARAFATE) 1 GM tablet Take 1 tablet by mouth 4 times daily (before meals and nightly) (Patient not taking: Reported on 10/9/2023) 120 tablet 3    [DISCONTINUED] insulin lispro, 1 Unit Dial, (HUMALOG KWIKPEN) 100 UNIT/ML SOPN Inject 20-25 Units into the skin in the morning and 20-25 Units at noon and 20-25 Units in the evening. Inject before meals.  22.5 mL 0    Cholecalciferol (VITAMIN D3 PO) Take 50,000 Units by mouth every 30 days 4000 units (Patient not taking: Reported on 10/9/2023)      [DISCONTINUED] insulin NPH (HUMULIN N;NOVOLIN N) 100 UNIT/ML injection vial Inject into the skin 2 times daily (before meals) Takes BID on the SS      rosuvastatin (CRESTOR) 10 MG tablet Take 1 tablet by mouth at bedtime      aspirin 81 MG chewable tablet Take 1 tablet by mouth daily

## 2023-10-22 NOTE — PLAN OF CARE
Problem: Respiratory - Adult  Goal: Normal spontaneous ventilation  Outcome: Progressing     Problem: Respiratory - Adult  Goal: Achieves optimal ventilation and oxygenation  10/22/2023 0540 by Santa Moore RCP  Outcome: Progressing                                                Patient Weaning Progress    The patient's vent settings was able to be weaned this shift. Ventilator settings that were weaned              [x] Mode   [] Pressure support weaned   [x] Fio2 weaned   [] Peep weaned      Spontaneous weaning trial  was attempted. The patient was able to tolerate SBT. RSBI  was 42.44 with EtCO2 of 29 and SpO2 of 99 on 25% FiO2. Spontanteous VT was 377 and RR 16 breaths/min. The patient's SBT began at [address] Evac tube was  hooked up with continuous low suction(20-30mmHg)      Cuff was not  deflated to determine cuff leak. Unable to get agreement for goals because no family is present and patient cannot respond.

## 2023-10-22 NOTE — PROCEDURES
LONG-TERM EEG-VIDEO MONITORING   CLINICAL [facility]    Patient: Kanwal Becerril  Age: 81 y.o. MRN: [id number]    Referring Physician: No ref. provider found  History: The patient is a 81 y.o. female who had a cardiac arrest and subsequent jerks concerning for seizures. This long-term video-EEG monitoring study was performed to evaluate for seizures. The patient is on neuroactive medications.    Nellie Sierra   Current Facility-Administered Medications   Medication Dose Route Frequency Provider Last Rate Last Admin    insulin lispro (HUMALOG) injection vial 0-8 Units  0-8 Units SubCUTAneous Q6H Carolina Whitlock DO   4 Units at 10/22/23 1120    potassium chloride (KLOR-CON M) extended release tablet 40 mEq  40 mEq Oral PRN Aniyah Oliveira MD        Or    potassium bicarb-citric acid (EFFER-K) effervescent tablet 40 mEq  40 mEq Oral PRN Aniyah Oliveira MD   40 mEq at 10/20/23 2441    Or    potassium chloride 10 mEq/100 mL IVPB (Peripheral Line)  10 mEq IntraVENous PRN Aniyah Oliveira MD        phenytoin (DILANTIN) injection 150 mg  150 mg IntraVENous Q12H Sam Bhatia PA-C   150 mg at 10/22/23 0908    phenytoin (DILANTIN) injection 50 mg  50 mg IntraVENous Daily Sam Bhatia PA-C   50 mg at 10/22/23 0908    insulin glargine (LANTUS) injection vial 5 Units  5 Units SubCUTAneous Nightly Aniyah Oliveira MD   5 Units at 10/21/23 2104    metoprolol (LOPRESSOR) injection 5 mg  5 mg IntraVENous Q4H PRN Aniyah Oliveira MD        [facility]] carvedilol (COREG) tablet 6.25 mg  6.25 mg Orogastric BID WC Carolina Whitlock DO   6.25 mg at 10/20/23 1736    [Held by provider] amLODIPine (NORVASC) tablet 5 mg  5 mg Orogastric Daily Carolina Whitlock DO   5 mg at 10/20/23 1736    bumetanide (BUMEX) injection 0.5 mg  0.5 mg IntraVENous Q24H Feliz Lombard, MD   0.5 mg at 10/21/23 1753    norepinephrine (LEVOPHED) 16 mg encephalopathies. This study thus far is improved compared to an EEG done on 10/19/2023 and is significantly improved from the original study done on 10/17/2023 due to more variability, evidence of reactivity and state changes seen on this study which were not present on the study on 10/17/2023. Monitoring was continued in order to evaluate for seizures. The EKG channel revealed no abnormalities. Please note this is a preliminary report and updated daily. The final report will have a summary of behavior and electrographic findings with clinical correlation.     Katina Saavedra, DO  Neurology/Epilepsy

## 2023-10-22 NOTE — PLAN OF CARE
Problem: Discharge Planning  Goal: Discharge to home or other facility with appropriate resources  Outcome: Not Progressing  Flowsheets (Taken 10/21/2023 2133 by Christina Hamilton RN)  Discharge to home or other facility with appropriate resources:   Identify barriers to discharge with patient and caregiver   Identify discharge learning needs (meds, wound care, etc)   Refer to discharge planning if patient needs post-hospital services based on physician order or complex needs related to functional status, cognitive ability or social support system   Arrange for needed discharge resources and transportation as appropriate     Problem: Discharge Planning  Goal: Discharge to home or other facility with appropriate resources  Outcome: Not Progressing  Flowsheets (Taken 10/21/2023 2133 by Christina Hamilton RN)  Discharge to home or other facility with appropriate resources:   Identify barriers to discharge with patient and caregiver   Identify discharge learning needs (meds, wound care, etc)   Refer to discharge planning if patient needs post-hospital services based on physician order or complex needs related to functional status, cognitive ability or social support system   Arrange for needed discharge resources and transportation as appropriate

## 2023-10-22 NOTE — PLAN OF CARE
Patient Weaning Progress    The patient's vent settings was able to be weaned this shift. Ventilator settings that were weaned              [] Mode   [] Pressure support weaned   [] Fio2 weaned   [] Peep weaned      Spontaneous weaning trial  was attempted. due to defined parameters for SBT (spontaneous breathing trial) not being met. Reason that defined ventilator parameters for SBT was not met              [] Patient condition requires increased ventilator settings  [] Requires increased sedation   [] Settings not within weaning range   [] SAT not completed   [] Physician orders    The patient was able to tolerate SBT. Evac tube was  hooked up with continuous low suction(20-30mmHg)              Unable to get agreement for goals because no family is present and patient cannot respond.       Problem: Discharge Planning  Goal: Discharge to home or other facility with appropriate resources  10/21/2023 2133 by Tata Ruiz RN  Outcome: Not Progressing  Flowsheets (Taken 10/21/2023 2133)  Discharge to home or other facility with appropriate resources:   Identify barriers to discharge with patient and caregiver   Identify discharge learning needs (meds, wound care, etc)   Refer to discharge planning if patient needs post-hospital services based on physician order or complex needs related to functional status, cognitive ability or social support system   Arrange for needed discharge resources and transportation as appropriate

## 2023-10-23 ENCOUNTER — APPOINTMENT (OUTPATIENT)
Dept: GENERAL RADIOLOGY | Age: 73
DRG: 163 | End: 2023-10-23
Attending: OTOLARYNGOLOGY
Payer: MEDICARE

## 2023-10-23 ENCOUNTER — APPOINTMENT (OUTPATIENT)
Dept: INTERVENTIONAL RADIOLOGY/VASCULAR | Age: 73
DRG: 163 | End: 2023-10-23
Attending: OTOLARYNGOLOGY
Payer: MEDICARE

## 2023-10-23 LAB
ANION GAP SERPL CALC-SCNC: 11 MEQ/L (ref 8–16)
BUN SERPL-MCNC: 20 MG/DL (ref 7–22)
CA-I BLD ISE-SCNC: 1.2 MMOL/L (ref 1.12–1.32)
CALCIUM SERPL-MCNC: 8.3 MG/DL (ref 8.5–10.5)
CHLORIDE SERPL-SCNC: 111 MEQ/L (ref 98–111)
CO2 SERPL-SCNC: 22 MEQ/L (ref 23–33)
CREAT SERPL-MCNC: 0.7 MG/DL (ref 0.4–1.2)
DEPRECATED RDW RBC AUTO: 53.1 FL (ref 35–45)
ERYTHROCYTE [DISTWIDTH] IN BLOOD BY AUTOMATED COUNT: 15.5 % (ref 11.5–14.5)
GFR SERPL CREATININE-BSD FRML MDRD: > 60 ML/MIN/1.73M2
GLUCOSE BLD STRIP.AUTO-MCNC: 210 MG/DL (ref 70–108)
GLUCOSE BLD STRIP.AUTO-MCNC: 252 MG/DL (ref 70–108)
GLUCOSE BLD STRIP.AUTO-MCNC: 258 MG/DL (ref 70–108)
GLUCOSE SERPL-MCNC: 232 MG/DL (ref 70–108)
HCT VFR BLD AUTO: 27.7 % (ref 37–47)
HGB BLD-MCNC: 8.6 GM/DL (ref 12–16)
MAGNESIUM SERPL-MCNC: 1.8 MG/DL (ref 1.6–2.4)
MCH RBC QN AUTO: 29.6 PG (ref 26–33)
MCHC RBC AUTO-ENTMCNC: 31 GM/DL (ref 32.2–35.5)
MCV RBC AUTO: 95.2 FL (ref 81–99)
PHENYTOIN SERPL-MCNC: 10.7 UG/ML (ref 6–18)
PLATELET # BLD AUTO: 238 THOU/MM3 (ref 130–400)
PMV BLD AUTO: 11.2 FL (ref 9.4–12.4)
POTASSIUM SERPL-SCNC: 3.4 MEQ/L (ref 3.5–5.2)
RBC # BLD AUTO: 2.91 MILL/MM3 (ref 4.2–5.4)
SODIUM SERPL-SCNC: 144 MEQ/L (ref 135–145)
VANCOMYCIN SERPL-MCNC: 22.4 UG/ML (ref 0.1–39.9)
WBC # BLD AUTO: 15.3 THOU/MM3 (ref 4.8–10.8)

## 2023-10-23 PROCEDURE — 6360000002 HC RX W HCPCS: Performed by: PHARMACIST

## 2023-10-23 PROCEDURE — 2580000003 HC RX 258: Performed by: EMERGENCY MEDICINE

## 2023-10-23 PROCEDURE — 2580000003 HC RX 258: Performed by: PHARMACIST

## 2023-10-23 PROCEDURE — 82330 ASSAY OF CALCIUM: CPT

## 2023-10-23 PROCEDURE — 6360000002 HC RX W HCPCS

## 2023-10-23 PROCEDURE — 71045 X-RAY EXAM CHEST 1 VIEW: CPT

## 2023-10-23 PROCEDURE — 6370000000 HC RX 637 (ALT 250 FOR IP)

## 2023-10-23 PROCEDURE — 95726 EEG PHY/QHP>84 HR W/VEEG: CPT | Performed by: PSYCHIATRY & NEUROLOGY

## 2023-10-23 PROCEDURE — 6360000002 HC RX W HCPCS: Performed by: PHYSICIAN ASSISTANT

## 2023-10-23 PROCEDURE — 6360000002 HC RX W HCPCS: Performed by: NURSE PRACTITIONER

## 2023-10-23 PROCEDURE — 6370000000 HC RX 637 (ALT 250 FOR IP): Performed by: INTERNAL MEDICINE

## 2023-10-23 PROCEDURE — 99024 POSTOP FOLLOW-UP VISIT: CPT | Performed by: PHYSICIAN ASSISTANT

## 2023-10-23 PROCEDURE — 2580000003 HC RX 258: Performed by: PHYSICIAN ASSISTANT

## 2023-10-23 PROCEDURE — 80048 BASIC METABOLIC PNL TOTAL CA: CPT

## 2023-10-23 PROCEDURE — 94003 VENT MGMT INPAT SUBQ DAY: CPT

## 2023-10-23 PROCEDURE — 83735 ASSAY OF MAGNESIUM: CPT

## 2023-10-23 PROCEDURE — 94761 N-INVAS EAR/PLS OXIMETRY MLT: CPT

## 2023-10-23 PROCEDURE — 2000000000 HC ICU R&B

## 2023-10-23 PROCEDURE — 99291 CRITICAL CARE FIRST HOUR: CPT | Performed by: INTERNAL MEDICINE

## 2023-10-23 PROCEDURE — 80202 ASSAY OF VANCOMYCIN: CPT

## 2023-10-23 PROCEDURE — 6360000002 HC RX W HCPCS: Performed by: INTERNAL MEDICINE

## 2023-10-23 PROCEDURE — 6370000000 HC RX 637 (ALT 250 FOR IP): Performed by: NURSE PRACTITIONER

## 2023-10-23 PROCEDURE — 85027 COMPLETE CBC AUTOMATED: CPT

## 2023-10-23 PROCEDURE — 36415 COLL VENOUS BLD VENIPUNCTURE: CPT

## 2023-10-23 PROCEDURE — 80185 ASSAY OF PHENYTOIN TOTAL: CPT

## 2023-10-23 PROCEDURE — 6370000000 HC RX 637 (ALT 250 FOR IP): Performed by: EMERGENCY MEDICINE

## 2023-10-23 PROCEDURE — 93970 EXTREMITY STUDY: CPT

## 2023-10-23 PROCEDURE — 95714 VEEG EA 12-26 HR UNMNTR: CPT

## 2023-10-23 PROCEDURE — 2580000003 HC RX 258: Performed by: NURSE PRACTITIONER

## 2023-10-23 PROCEDURE — 37799 UNLISTED PX VASCULAR SURGERY: CPT

## 2023-10-23 PROCEDURE — 6360000002 HC RX W HCPCS: Performed by: EMERGENCY MEDICINE

## 2023-10-23 PROCEDURE — 82948 REAGENT STRIP/BLOOD GLUCOSE: CPT

## 2023-10-23 PROCEDURE — 2500000003 HC RX 250 WO HCPCS: Performed by: INTERNAL MEDICINE

## 2023-10-23 RX ORDER — INSULIN GLARGINE 100 [IU]/ML
7 INJECTION, SOLUTION SUBCUTANEOUS NIGHTLY
Status: DISCONTINUED | OUTPATIENT
Start: 2023-10-23 | End: 2023-10-25

## 2023-10-23 RX ORDER — ENOXAPARIN SODIUM 100 MG/ML
70 INJECTION SUBCUTANEOUS EVERY 12 HOURS
Status: DISCONTINUED | OUTPATIENT
Start: 2023-10-23 | End: 2023-10-29

## 2023-10-23 RX ORDER — INSULIN LISPRO 100 [IU]/ML
0-16 INJECTION, SOLUTION INTRAVENOUS; SUBCUTANEOUS
Status: DISCONTINUED | OUTPATIENT
Start: 2023-10-23 | End: 2023-10-23

## 2023-10-23 RX ORDER — INSULIN LISPRO 100 [IU]/ML
0-16 INJECTION, SOLUTION INTRAVENOUS; SUBCUTANEOUS EVERY 6 HOURS
Status: DISPENSED | OUTPATIENT
Start: 2023-10-23

## 2023-10-23 RX ORDER — INSULIN LISPRO 100 [IU]/ML
0-4 INJECTION, SOLUTION INTRAVENOUS; SUBCUTANEOUS NIGHTLY
Status: DISCONTINUED | OUTPATIENT
Start: 2023-10-23 | End: 2023-10-23

## 2023-10-23 RX ADMIN — PHENYTOIN SODIUM 50 MG: 50 INJECTION INTRAMUSCULAR; INTRAVENOUS at 09:48

## 2023-10-23 RX ADMIN — POTASSIUM CHLORIDE 20 MEQ: 29.8 INJECTION, SOLUTION INTRAVENOUS at 06:49

## 2023-10-23 RX ADMIN — SODIUM CHLORIDE, PRESERVATIVE FREE 10 ML: 5 INJECTION INTRAVENOUS at 21:08

## 2023-10-23 RX ADMIN — PHENYTOIN SODIUM 150 MG: 50 INJECTION INTRAMUSCULAR; INTRAVENOUS at 21:24

## 2023-10-23 RX ADMIN — INSULIN LISPRO 4 UNITS: 100 INJECTION, SOLUTION INTRAVENOUS; SUBCUTANEOUS at 18:17

## 2023-10-23 RX ADMIN — POTASSIUM CHLORIDE 20 MEQ: 29.8 INJECTION, SOLUTION INTRAVENOUS at 09:30

## 2023-10-23 RX ADMIN — BUMETANIDE 0.5 MG: 0.25 INJECTION INTRAMUSCULAR; INTRAVENOUS at 18:18

## 2023-10-23 RX ADMIN — MODAFINIL 200 MG: 100 TABLET ORAL at 09:49

## 2023-10-23 RX ADMIN — VANCOMYCIN HYDROCHLORIDE 1000 MG: 1 INJECTION, POWDER, LYOPHILIZED, FOR SOLUTION INTRAVENOUS at 00:15

## 2023-10-23 RX ADMIN — FAMOTIDINE 20 MG: 20 TABLET ORAL at 09:49

## 2023-10-23 RX ADMIN — INSULIN LISPRO 4 UNITS: 100 INJECTION, SOLUTION INTRAVENOUS; SUBCUTANEOUS at 00:10

## 2023-10-23 RX ADMIN — FAMOTIDINE 20 MG: 20 TABLET ORAL at 21:08

## 2023-10-23 RX ADMIN — ENOXAPARIN SODIUM 70 MG: 100 INJECTION SUBCUTANEOUS at 17:24

## 2023-10-23 RX ADMIN — PHENYTOIN SODIUM 150 MG: 50 INJECTION INTRAMUSCULAR; INTRAVENOUS at 09:48

## 2023-10-23 RX ADMIN — VANCOMYCIN HYDROCHLORIDE 1000 MG: 1 INJECTION, POWDER, LYOPHILIZED, FOR SOLUTION INTRAVENOUS at 11:40

## 2023-10-23 RX ADMIN — CHLORHEXIDINE GLUCONATE 0.12% ORAL RINSE 15 ML: 1.2 LIQUID ORAL at 09:49

## 2023-10-23 RX ADMIN — SODIUM CHLORIDE, PRESERVATIVE FREE 10 ML: 5 INJECTION INTRAVENOUS at 09:51

## 2023-10-23 RX ADMIN — LEVETIRACETAM 1000 MG: 100 INJECTION, SOLUTION INTRAVENOUS at 05:26

## 2023-10-23 RX ADMIN — LEVETIRACETAM 1000 MG: 100 INJECTION, SOLUTION INTRAVENOUS at 18:00

## 2023-10-23 RX ADMIN — INSULIN LISPRO 2 UNITS: 100 INJECTION, SOLUTION INTRAVENOUS; SUBCUTANEOUS at 06:47

## 2023-10-23 RX ADMIN — CHLORHEXIDINE GLUCONATE 0.12% ORAL RINSE 15 ML: 1.2 LIQUID ORAL at 21:08

## 2023-10-23 RX ADMIN — PIPERACILLIN AND TAZOBACTAM 3375 MG: 3; .375 INJECTION, POWDER, LYOPHILIZED, FOR SOLUTION INTRAVENOUS at 10:33

## 2023-10-23 RX ADMIN — PIPERACILLIN AND TAZOBACTAM 3375 MG: 3; .375 INJECTION, POWDER, LYOPHILIZED, FOR SOLUTION INTRAVENOUS at 02:05

## 2023-10-23 RX ADMIN — INSULIN GLARGINE 7 UNITS: 100 INJECTION, SOLUTION SUBCUTANEOUS at 21:08

## 2023-10-23 RX ADMIN — PIPERACILLIN AND TAZOBACTAM 3375 MG: 3; .375 INJECTION, POWDER, LYOPHILIZED, FOR SOLUTION INTRAVENOUS at 18:09

## 2023-10-23 ASSESSMENT — PULMONARY FUNCTION TESTS
PIF_VALUE: 17
PIF_VALUE: 15
PIF_VALUE: 17
PIF_VALUE: 18

## 2023-10-23 NOTE — PROGRESS NOTES
Patient's  had called while I was tied up with something else. I attempted to call him back. There was no answer and I was unable to leave a voice message as the voice mail was full.

## 2023-10-23 NOTE — PROCEDURES
LONG-TERM EEG-VIDEO MONITORING   CLINICAL [facility]    Patient: Nakia Fraser  Age: 81 y.o. MRN: [id number]    Referring Physician: No ref. provider found  History: The patient is a 81 y.o. female who had a cardiac arrest and subsequent jerks concerning for seizures. This long-term video-EEG monitoring study was performed to evaluate for seizures. The patient is on neuroactive medications.    Reanna Sierra   Current Facility-Administered Medications   Medication Dose Route Frequency Provider Last Rate Last Admin    insulin lispro (HUMALOG) injection vial 0-8 Units  0-8 Units SubCUTAneous Q6H Brendon Legacy, DO   2 Units at 10/23/23 3106    potassium chloride (KLOR-CON M) extended release tablet 40 mEq  40 mEq Oral PRN Kodak Alicea MD        Or    potassium bicarb-citric acid (EFFER-K) effervescent tablet 40 mEq  40 mEq Oral PRN Kodak Alicea MD   40 mEq at 10/20/23 6444    Or    potassium chloride 10 mEq/100 mL IVPB (Peripheral Line)  10 mEq IntraVENous PRN Kodak Alicea MD        phenytoin (DILANTIN) injection 150 mg  150 mg IntraVENous Q12H Sam Bhatia PA-C   150 mg at 10/22/23 2035    phenytoin (DILANTIN) injection 50 mg  50 mg IntraVENous Daily Sam Bhatia PA-C   50 mg at 10/22/23 0908    insulin glargine (LANTUS) injection vial 5 Units  5 Units SubCUTAneous Nightly Kodak Alicea MD   5 Units at 10/22/23 2034    metoprolol (LOPRESSOR) injection 5 mg  5 mg IntraVENous Q4H PRN Kodak Alicea MD        [facility]] carvedilol (COREG) tablet 6.25 mg  6.25 mg Orogastric BID WC Brendon Legacy, DO   6.25 mg at 10/20/23 1736    [Held by provider] amLODIPine (NORVASC) tablet 5 mg  5 mg Orogastric Daily Brendon Legacy, DO   5 mg at 10/20/23 1736    bumetanide (BUMEX) injection 0.5 mg  0.5 mg IntraVENous Q24H Porfirio Balderas MD   0.5 mg at 10/22/23 1744    norepinephrine (LEVOPHED) 16 mg in sodium chloride 0.9 % 250 mL infusion  1-100 mcg/min IntraVENous Continuous Alta Heller, DO   Stopped at 10/21/23 0131    potassium chloride 20 mEq/50 mL IVPB (Central Line)  20 mEq IntraVENous PRN Alta Dixon, DO 50 mL/hr at 10/23/23 0649 20 mEq at 10/23/23 3281    Or    potassium chloride 10 mEq/100 mL IVPB (Peripheral Line)  10 mEq IntraVENous PRN Alta Rodriguesler, DO        famotidine (PEPCID) tablet 20 mg  20 mg Oral BID [facility], DO   20 mg at 10/22/23 2034    [Held by provider] enoxaparin (LOVENOX) injection 80 mg  1 mg/kg SubCUTAneous BID [facility], DO   80 mg at 10/19/23 2043    modafinil (PROVIGIL) tablet 200 mg  200 mg Oral Daily Arlyn Pitts APRN - CNP   200 mg at 10/22/23 0746    scopolamine (TRANSDERM-SCOP) transdermal patch 1 patch  1 patch TransDERmal Q72H Nicolas Lawson APRN - CNP   1 patch at 10/21/23 0218    levETIRAcetam (KEPPRA) 1,000 mg in sodium chloride 0.9 % 100 mL IVPB  1,000 mg IntraVENous Q12H [facility], DO   Stopped at 10/23/23 0543    LORazepam (ATIVAN) injection 4 mg  4 mg IntraVENous Q5 Min PRN Elvia Victor APRN - CNP        vancomycin (VANCOCIN) 1,000 mg in sodium chloride 0.9 % 250 mL IVPB ([id number])  1,000 mg IntraVENous Q12H [place] Gloss, [facility]   Stopped at 10/23/23 0118    sodium chloride flush 0.9 % injection 5-40 mL  5-40 mL IntraVENous 2 times per day Zhanna Muniz, APRN - CNP   10 mL at 10/22/23 2034    sodium chloride flush 0.9 % injection 5-40 mL  5-40 mL IntraVENous PRN Zhanna Muniz, APRN - CNP        0.9 % sodium chloride infusion   IntraVENous PRN Zhanna Muniz, APRN - CNP 20 mL/hr at 10/22/23 1908 Rate Verify at 10/22/23 1908    polyethylene glycol (GLYCOLAX) packet 17 g  17 g Per NG tube Daily PRN Zhanna Muniz, APRN - CNP        ondansetron (ZOFRAN-ODT) disintegrating tablet 4 mg  4 mg Oral Q8H PRN Flavio, Crystal L, APRN - CNP        Or    ondansetron (ZOFRAN) injection 4 mg

## 2023-10-23 NOTE — PROGRESS NOTES
CRITICAL CARE PROGRESS NOTE      Patient:  Perez [facility]    Unit/Bed:4D-08/008-A  YOB: 1950  MRN: [id number]   PCP: Susan Licona MD  Date of Admission: 10/12/2023  Chief Complaint:- tracheal stenosis s/p resection    Assessment and Plan:    Anoxic Brain Injury:  2/2 respiratory arrest and subsequent cardiac arrest following failed extubation 10/16   EEG 10/17: diffuse background attenuation and suppression w/out evidence of reactivity. No seizures.    CT Head 10/17: no acute findings   MRI Brain 10/17: no acute findings   MRI Brain 10/19: No evidence of anoxia, no evidence of acute infarct   EEG 10/19: No seizures, moderate to severe encephalopathy   Continuous EEG starting 10/20   No seizures, improved compared to EEG on 10/19 and significantly improved from 10/17  3% @ 15mL/hr with goal Na 145-150 for concerns of cerebral edema, HELD 10/20   Neuro following, started Provigil 200mg   Seizure prophylaxis with Keppra and Dilantin added by neurology   On zosyn and vancomycin for surgical site infection prophylaxis   S/p cricotracheal resection, laryngoplasty with flap and vocal fold lateralization stitch placement: with Dr. Ivette Casarez   POD 11  HOB at 30 degrees with pillow under head for semi-flexed position   Monitor RACHEL drains, 10mL output last 24 hrs   10/18: 5.0 ET tube exchanged for 7.0 ET tube by Dr. Ivette Casarez   Bilateral DVTs, superficial:    Venous Duplex Lower Extremity 10/12: thrombus in BL posterior tibial veins and right peroneal vein   Patient off of heparin and started on Lovenox  Lovenox held by ENT due to blood around surgical site 10/20  Reached out to ENT to see if we can resume lovenox   DM Type 2:  01/2023 HbA1c: 5.5   Patient on 5 units Lantus nightly   High dose SSI   Chronic diastolic CHF:  History   ECHO 1/2/23: EF 60-65%  Strict I/O   Monitor for fluid overload  10/21: CXR: Worsening interstitial edema, right perihilar subsegmental atelectasis   Patient on Bumex acute events over night. Pt exhibiting decorticate posturing. +cough and gag. Pupils 2-3 mm, round, equal and reactive. Plan for OR today with Dr Tee Martinez. 10/19: Yesterday Dr Tee Martinez exchanged her 5 ETT for a 7 ETT, minimal EBL. No acute events over night. \"    10/20: No acute events overnight. Patient is withdrawing her lower extremities to pain. Patient does not withdrawal to pain in the upper extremities. 10/21: Overnight the patient became hypotensive and required levophed. See Dr. Hebert Collier significant event note for further details. This morning the patient is off of levophed. Her neuro exam is unchanged from yesterday. Yesterday there was some seizure like activity in her upper extremities so she was started on propofol. When the patient went hypotensive yesterday evening the propofol was stopped. The patient has not had any seizure like activity since the propofol was stopped. 10/23: No acute events overnight. Patient had her eyes open this morning. Still flexes her lower extremity to pain however not at strong. No seizure on EEG. Continue to monitor for neurological recovery. Past Medical History:  Obtained from chart review due to patient being intubated. Arthritis, CAD, diabetic neuropathy, DVT, hyperlipidemia, hypertension, type 2 diabetes. Family History: Father: Parkinson's disease, lung cancer. Social History: Denies any smoking history, no recent alcohol use. ROS   Unable to be obtained due to patient being intubated.      Scheduled Meds:   insulin lispro  0-8 Units SubCUTAneous Q6H    phenytoin  150 mg IntraVENous Q12H    phenytoin  50 mg IntraVENous Daily    insulin glargine  5 Units SubCUTAneous Nightly    [Held by provider] carvedilol  6.25 mg Orogastric BID     [Held by provider] amLODIPine  5 mg Orogastric Daily    bumetanide  0.5 mg IntraVENous Q24H    famotidine  20 mg Oral BID    [Held by provider] enoxaparin  1 mg/kg SubCUTAneous BID    modafinil  200 mg Oral

## 2023-10-23 NOTE — PROGRESS NOTES
, Soheila Sanchez, here to visit. Dr Yoshi Wong at pt bedside talking with Soheila Sanchez about pt condition.

## 2023-10-23 NOTE — PROGRESS NOTES
[facility]--. [id number] [facility] PROGRESS NOTE      Patient: Keke Gonzalez  Room #: 4D-08/008-A            YOB: 1950  Age: 81 y.o. Gender: female            Admit Date & Time: 10/12/2023  6:19 AM    Assessment:    The patient was asleep and intubated at the time of this attempted visit. Interventions: The patient was provided silent prayer. Outcomes: The patient remains sleeping at the end of the visit. Plan: 1. Spiritual care will continue to follow the patient according to Apurva Allen's spiritual care SOP.        Electronically signed by Jose Eduardo Clark, on 10/23/2023 at 8:23 AM.  [place]eno  [phone]

## 2023-10-23 NOTE — PLAN OF CARE
Problem: Respiratory - Adult  Goal: Normal spontaneous ventilation  Outcome: Progressing                                                Patient Weaning Progress    The patient's vent settings was able to be weaned this shift. Ventilator settings that were weaned              [x] Mode   [] Pressure support weaned   [] Fio2 weaned   [] Peep weaned      Spontaneous weaning trial  was attempted. The patient was able to tolerate SBT. RSBI  was 32 with EtCO2 of 29 and SpO2 of 97 on 25% FiO2. Spontanteous VT was 510 and RR 20 breaths/min. The patient was on SBT for 180 minutes     Unable to get agreement for goals because no family is present and patient cannot respond.

## 2023-10-23 NOTE — PROGRESS NOTES
Pharmacist Review and Automatic Dose Adjustment of Treatment Dose Enoxaparin    The reviewing pharmacist has made an adjustment to the ordered enoxaparin treatment dose per the approved [facility] protocol and table as identified below. Deja Hunter is a 81 y.o. female. Recent Labs     10/21/23  0435 10/22/23  0348 10/23/23  0515   CREATININE 0.7 0.6 0.7       Estimated Creatinine Clearance: 69 mL/min (based on SCr of 0.7 mg/dL). Recent Labs     10/22/23  0348 10/23/23  0515   HGB 7.7* 8.6*   HCT 24.9* 27.7*    238     No results for input(s): \"INR\" in the last 72 hours.     Height:   Ht Readings from Last 1 Encounters:   10/12/23 1.575 m (5' 2\")     Weight:  Wt Readings from Last 1 Encounters:   10/23/23 71.2 kg (156 lb 15.5 oz)     Enoxaparin Indication: treatment bilateral DVTs        Plan: Based upon the patient's weight and renal function, the enoxaparin dose ordered of 60 mg BID has been changed to 70 mg BID    Thank you,  [address] [place] [address], [facility]  10/23/2023, 3:36 PM

## 2023-10-23 NOTE — PLAN OF CARE
Problem: Discharge Planning  Goal: Discharge to home or other facility with appropriate resources  10/23/2023 0110 by Chikis Castillo RN  Outcome: Progressing  Flowsheets (Taken 10/23/2023 0110)  Discharge to home or other facility with appropriate resources:   Identify barriers to discharge with patient and caregiver   Arrange for needed discharge resources and transportation as appropriate   Identify discharge learning needs (meds, wound care, etc)   Arrange for interpreters to assist at discharge as needed   Refer to discharge planning if patient needs post-hospital services based on physician order or complex needs related to functional status, cognitive ability or social support system  Note: Patient continues to meet ICU criteria. Patient is intubated. No plans for discharge at this time. Problem: Chronic Conditions and Co-morbidities  Goal: Patient's chronic conditions and co-morbidity symptoms are monitored and maintained or improved  10/23/2023 0110 by Chikis Castillo RN  Outcome: Progressing  Flowsheets (Taken 10/23/2023 0110)  Care Plan - Patient's Chronic Conditions and Co-Morbidity Symptoms are Monitored and Maintained or Improved:   Monitor and assess patient's chronic conditions and comorbid symptoms for stability, deterioration, or improvement   Collaborate with multidisciplinary team to address chronic and comorbid conditions and prevent exacerbation or deterioration   Update acute care plan with appropriate goals if chronic or comorbid symptoms are exacerbated and prevent overall improvement and discharge  Note: Intensitivist group managing acute and chronic issues.        Problem: Safety - Adult  Goal: Free from fall injury  10/23/2023 0110 by Chikis Castillo RN  Outcome: Progressing  Flowsheets (Taken 10/23/2023 0110)  Free From Fall Injury:   Instruct family/caregiver on patient safety   Based on caregiver fall risk screen, instruct family/caregiver to ask for assistance with

## 2023-10-24 LAB
ANION GAP SERPL CALC-SCNC: 11 MEQ/L (ref 8–16)
BACTERIA: ABNORMAL
BILIRUB UR QL STRIP: NEGATIVE
BUN SERPL-MCNC: 18 MG/DL (ref 7–22)
CA-I BLD ISE-SCNC: 1.07 MMOL/L (ref 1.12–1.32)
CALCIUM SERPL-MCNC: 8.2 MG/DL (ref 8.5–10.5)
CASTS #/AREA URNS LPF: ABNORMAL /LPF
CASTS #/AREA URNS LPF: ABNORMAL /LPF
CHARACTER UR: ABNORMAL
CHARCOAL URNS QL MICRO: ABNORMAL
CHLORIDE SERPL-SCNC: 111 MEQ/L (ref 98–111)
CO2 SERPL-SCNC: 21 MEQ/L (ref 23–33)
COLOR UR: YELLOW
CREAT SERPL-MCNC: 0.7 MG/DL (ref 0.4–1.2)
CRYSTALS URNS QL MICRO: ABNORMAL
DEPRECATED RDW RBC AUTO: 54.7 FL (ref 35–45)
EPITHELIAL CELLS, UA: ABNORMAL /HPF
ERYTHROCYTE [DISTWIDTH] IN BLOOD BY AUTOMATED COUNT: 15.9 % (ref 11.5–14.5)
GFR SERPL CREATININE-BSD FRML MDRD: > 60 ML/MIN/1.73M2
GLUCOSE BLD STRIP.AUTO-MCNC: 139 MG/DL (ref 70–108)
GLUCOSE BLD STRIP.AUTO-MCNC: 182 MG/DL (ref 70–108)
GLUCOSE BLD STRIP.AUTO-MCNC: 199 MG/DL (ref 70–108)
GLUCOSE BLD STRIP.AUTO-MCNC: 213 MG/DL (ref 70–108)
GLUCOSE BLD STRIP.AUTO-MCNC: 214 MG/DL (ref 70–108)
GLUCOSE BLD STRIP.AUTO-MCNC: 240 MG/DL (ref 70–108)
GLUCOSE SERPL-MCNC: 223 MG/DL (ref 70–108)
GLUCOSE UR QL STRIP.AUTO: NEGATIVE MG/DL
HCT VFR BLD AUTO: 27.2 % (ref 37–47)
HGB BLD-MCNC: 8.5 GM/DL (ref 12–16)
HGB UR QL STRIP.AUTO: ABNORMAL
KETONES UR QL STRIP.AUTO: NEGATIVE
LEUKOCYTE ESTERASE UR QL STRIP.AUTO: ABNORMAL
MCH RBC QN AUTO: 29.9 PG (ref 26–33)
MCHC RBC AUTO-ENTMCNC: 31.3 GM/DL (ref 32.2–35.5)
MCV RBC AUTO: 95.8 FL (ref 81–99)
NITRITE UR QL STRIP.AUTO: NEGATIVE
PH UR STRIP.AUTO: 5 [PH] (ref 5–9)
PHENYTOIN SERPL-MCNC: 10.1 UG/ML (ref 6–18)
PLATELET # BLD AUTO: 242 THOU/MM3 (ref 130–400)
PMV BLD AUTO: 10.5 FL (ref 9.4–12.4)
POTASSIUM SERPL-SCNC: 3.7 MEQ/L (ref 3.5–5.2)
PROT UR STRIP.AUTO-MCNC: ABNORMAL MG/DL
RBC # BLD AUTO: 2.84 MILL/MM3 (ref 4.2–5.4)
RBC #/AREA URNS HPF: ABNORMAL /HPF
RENAL EPI CELLS #/AREA URNS HPF: ABNORMAL /[HPF]
SODIUM SERPL-SCNC: 143 MEQ/L (ref 135–145)
SP GR UR REFRACT.AUTO: 1.02 (ref 1–1.03)
UROBILINOGEN UR QL STRIP.AUTO: 0.2 EU/DL (ref 0–1)
WBC # BLD AUTO: 17.2 THOU/MM3 (ref 4.8–10.8)
WBC #/AREA URNS HPF: > 100 /HPF
YEAST LIKE FUNGI URNS QL MICRO: ABNORMAL

## 2023-10-24 PROCEDURE — 6370000000 HC RX 637 (ALT 250 FOR IP): Performed by: EMERGENCY MEDICINE

## 2023-10-24 PROCEDURE — 6370000000 HC RX 637 (ALT 250 FOR IP): Performed by: NURSE PRACTITIONER

## 2023-10-24 PROCEDURE — 82330 ASSAY OF CALCIUM: CPT

## 2023-10-24 PROCEDURE — 94003 VENT MGMT INPAT SUBQ DAY: CPT

## 2023-10-24 PROCEDURE — 2700000000 HC OXYGEN THERAPY PER DAY

## 2023-10-24 PROCEDURE — 80048 BASIC METABOLIC PNL TOTAL CA: CPT

## 2023-10-24 PROCEDURE — 2500000003 HC RX 250 WO HCPCS: Performed by: INTERNAL MEDICINE

## 2023-10-24 PROCEDURE — 36415 COLL VENOUS BLD VENIPUNCTURE: CPT

## 2023-10-24 PROCEDURE — 6360000002 HC RX W HCPCS: Performed by: EMERGENCY MEDICINE

## 2023-10-24 PROCEDURE — 6360000002 HC RX W HCPCS: Performed by: PHYSICIAN ASSISTANT

## 2023-10-24 PROCEDURE — 2000000000 HC ICU R&B

## 2023-10-24 PROCEDURE — 2580000003 HC RX 258

## 2023-10-24 PROCEDURE — 6370000000 HC RX 637 (ALT 250 FOR IP)

## 2023-10-24 PROCEDURE — 85027 COMPLETE CBC AUTOMATED: CPT

## 2023-10-24 PROCEDURE — 6360000002 HC RX W HCPCS: Performed by: INTERNAL MEDICINE

## 2023-10-24 PROCEDURE — 6360000002 HC RX W HCPCS

## 2023-10-24 PROCEDURE — 94761 N-INVAS EAR/PLS OXIMETRY MLT: CPT

## 2023-10-24 PROCEDURE — 80185 ASSAY OF PHENYTOIN TOTAL: CPT

## 2023-10-24 PROCEDURE — 2580000003 HC RX 258: Performed by: EMERGENCY MEDICINE

## 2023-10-24 PROCEDURE — 2580000003 HC RX 258: Performed by: NURSE PRACTITIONER

## 2023-10-24 PROCEDURE — 2580000003 HC RX 258: Performed by: PHYSICIAN ASSISTANT

## 2023-10-24 PROCEDURE — 81001 URINALYSIS AUTO W/SCOPE: CPT

## 2023-10-24 PROCEDURE — 99024 POSTOP FOLLOW-UP VISIT: CPT | Performed by: PHYSICIAN ASSISTANT

## 2023-10-24 PROCEDURE — 82948 REAGENT STRIP/BLOOD GLUCOSE: CPT

## 2023-10-24 PROCEDURE — 99291 CRITICAL CARE FIRST HOUR: CPT | Performed by: INTERNAL MEDICINE

## 2023-10-24 PROCEDURE — 6370000000 HC RX 637 (ALT 250 FOR IP): Performed by: INTERNAL MEDICINE

## 2023-10-24 RX ORDER — CALCIUM POLYCARBOPHIL 625 MG 625 MG/1
625 TABLET ORAL 2 TIMES DAILY
Status: DISCONTINUED | OUTPATIENT
Start: 2023-10-24 | End: 2023-10-24

## 2023-10-24 RX ORDER — CALCIUM POLYCARBOPHIL 625 MG 625 MG/1
625 TABLET ORAL 2 TIMES DAILY PRN
Status: DISPENSED | OUTPATIENT
Start: 2023-10-24

## 2023-10-24 RX ADMIN — VANCOMYCIN HYDROCHLORIDE 1000 MG: 1 INJECTION, POWDER, LYOPHILIZED, FOR SOLUTION INTRAVENOUS at 00:23

## 2023-10-24 RX ADMIN — INSULIN LISPRO 4 UNITS: 100 INJECTION, SOLUTION INTRAVENOUS; SUBCUTANEOUS at 05:02

## 2023-10-24 RX ADMIN — PHENYTOIN SODIUM 150 MG: 50 INJECTION INTRAMUSCULAR; INTRAVENOUS at 20:42

## 2023-10-24 RX ADMIN — PIPERACILLIN AND TAZOBACTAM 3375 MG: 3; .375 INJECTION, POWDER, LYOPHILIZED, FOR SOLUTION INTRAVENOUS at 09:59

## 2023-10-24 RX ADMIN — CHLORHEXIDINE GLUCONATE 0.12% ORAL RINSE 15 ML: 1.2 LIQUID ORAL at 09:28

## 2023-10-24 RX ADMIN — PHENYTOIN SODIUM 150 MG: 50 INJECTION INTRAMUSCULAR; INTRAVENOUS at 09:29

## 2023-10-24 RX ADMIN — SODIUM CHLORIDE, PRESERVATIVE FREE 10 ML: 5 INJECTION INTRAVENOUS at 09:28

## 2023-10-24 RX ADMIN — INSULIN GLARGINE 7 UNITS: 100 INJECTION, SOLUTION SUBCUTANEOUS at 20:42

## 2023-10-24 RX ADMIN — SODIUM CHLORIDE, PRESERVATIVE FREE 10 ML: 5 INJECTION INTRAVENOUS at 20:39

## 2023-10-24 RX ADMIN — FAMOTIDINE 20 MG: 20 TABLET ORAL at 20:43

## 2023-10-24 RX ADMIN — LEVETIRACETAM 1000 MG: 100 INJECTION, SOLUTION INTRAVENOUS at 17:30

## 2023-10-24 RX ADMIN — PHENYTOIN SODIUM 50 MG: 50 INJECTION INTRAMUSCULAR; INTRAVENOUS at 09:29

## 2023-10-24 RX ADMIN — PIPERACILLIN AND TAZOBACTAM 3375 MG: 3; .375 INJECTION, POWDER, LYOPHILIZED, FOR SOLUTION INTRAVENOUS at 01:50

## 2023-10-24 RX ADMIN — BUMETANIDE 0.5 MG: 0.25 INJECTION INTRAMUSCULAR; INTRAVENOUS at 17:25

## 2023-10-24 RX ADMIN — FAMOTIDINE 20 MG: 20 TABLET ORAL at 09:28

## 2023-10-24 RX ADMIN — LEVETIRACETAM 1000 MG: 100 INJECTION, SOLUTION INTRAVENOUS at 06:41

## 2023-10-24 RX ADMIN — ENOXAPARIN SODIUM 70 MG: 100 INJECTION SUBCUTANEOUS at 06:40

## 2023-10-24 RX ADMIN — SODIUM CHLORIDE 100 MG: 9 INJECTION, SOLUTION INTRAVENOUS at 18:58

## 2023-10-24 RX ADMIN — MODAFINIL 200 MG: 100 TABLET ORAL at 09:28

## 2023-10-24 RX ADMIN — ENOXAPARIN SODIUM 70 MG: 100 INJECTION SUBCUTANEOUS at 17:33

## 2023-10-24 RX ADMIN — CHLORHEXIDINE GLUCONATE 0.12% ORAL RINSE 15 ML: 1.2 LIQUID ORAL at 20:39

## 2023-10-24 ASSESSMENT — PULMONARY FUNCTION TESTS
PIF_VALUE: 15
PIF_VALUE: 15
PIF_VALUE: 14

## 2023-10-24 NOTE — PROGRESS NOTES
Comprehensive Nutrition Assessment    Type and Reason for Visit:  Reassess (tubefeeding)    Nutrition Recommendations/Plan:   Continue current tubefeeding regimen: Vital 1.2 bolus 215 ml every 4 hours to better meet nutritional needs. Free H20 flush 30ml before and after each bolus (or per provider). Note rectal tube placed 10/23/23 due to loose stools, RN today reports no output from rectal tube     Malnutrition Assessment:  Malnutrition Status:  Insufficient data (10/13/23 1128)    Context:  Chronic Illness     Findings of the 6 clinical characteristics of malnutrition:  Energy Intake:  Unable to assess (pt. intubated)  Weight Loss:  Greater than 10% over 6 months (31# or 16% in 5 months)     Body Fat Loss:  Unable to assess (facial edema; RN asked not to disturb pt. this am)     Muscle Mass Loss:  Unable to assess    Fluid Accumulation:  Mild     Strength:  Not Performed    Nutrition Assessment:      Pt. 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 Nutrition Related Findings:    Pt. Report/Treatments/Miscellaneous: Patient seen, RN present, patient remains on vent. Tolerating current tubefeeding boluses, RN reports nights did manual boluses instead of using tubefeeding pump due to being faster and to ensure free water before and after each bolus given. She also reports patient was having loose stools yesterday so rectal tube placed however hasn't had any bowel movements since rectal tube placed.    GI Status: BM 10/23/23  Pertinent Labs: 10/24/23: Glucose 223, Chemstick 199, , 214  Pertinent Meds: bumex, lantus, humalog, keppra, dilantin, zosyn, scopolamine     Wound Type: Pressure Injury, Stage II (coccyx; stage 1 buttocks; skin tear abdomen; 10/12 ENT surgery: Cricotracheal Resection, Laryngoplasty with Flap and Vocal Fold Lateralization Stitch Placement)       Current Nutrition Intake & Therapies:          Diet NPO Exceptions are: Sips of Water with Meds  ADULT TUBE FEEDING; Nasogastric; Peptide Based; Bolus; 6 Times Daily; 215; Gravity; 30; Before and after each bolus  Current Tube Feeding (TF) Orders:  Feeding Route: Nasogastric (with bridle device)  Formula: Peptide Based (started 10/13)  Schedule: Bolus  Feeding Regimen: Vital 1.2 bolus, 215 mls every 4 hours  Additives/Modulars: None  Water Flushes: 30ml before and after each bolus  Current TF & Flush Orders Provides: Vital 1.2 bolus 215 ml every 4 hours = 1548 kcals, 97 gm protein, 143 gm CHO, 7 gm fiber, 1406 ml free water (1046 TF, 360 flushes) in 1650 ml total volume (1290 TF, 360 flushes)/24 hours  Goal TF & Flush Orders Provides: Vital 1.2 bolus 215 ml every 4 hours = 1548 kcals, 97 gm protein, 143 gm CHO, 7 gm fiber, 1406 ml free water (1046 TF, 360 flushes) in 1650 ml total volume (1290 TF, 360 flushes)/24 hours    Anthropometric Measures:  Height: 157.5 cm (5' 2\")  Ideal Body Weight (IBW): 110 lbs (50 kg)    Admission Body Weight: 76.2 kg (168 lb) (10/12 with facial edema)  Current Body Weight: 71.2 kg (156 lb 15.5 oz) (10/23/23 +2-3 edema bedscale, monitoring trends-large fluctuations),  Weight Source: Bed Scale  Current BMI (kg/m2): 28.7  Usual Body Weight: 90.3 kg (199 lb) (5/11/23; 206# 2/28/23)  % Weight Change (Calculated): -15.6                    BMI Categories: Overweight (BMI 25.0-29. 9)    Estimated Daily Nutrient Needs:  Energy Requirements Based On: Kcal/kg  Weight Used for Energy Requirements: Admission (76kgm)  Energy (kcal/day): 5298-5619 (20-25/kgm)  Weight Used for Protein Requirements: Ideal (50kgm)  Protein (g/day):  grams (1.2-2)  Method Used for Fluid Requirements: Other (Comment)  Fluid (ml/day): per Physician    Nutrition Diagnosis:   Inadequate oral intake related to impaired respiratory function as evidenced by NPO or clear liquid status due to medical condition, intubation, nutrition support - enteral nutrition    Nutrition Interventions:   Food and/or Nutrient Delivery: Continue NPO, Continue Current Tube Feeding  Nutrition Education/Counseling: No recommendation at this time  Coordination of Nutrition Care: Continue to monitor while inpatient, Interdisciplinary Rounds       Goals:  Previous Goal Met: Progressing toward Goal(s)  Goals: Tolerate nutrition support at goal rate, by next RD assessment       Nutrition Monitoring and Evaluation:      Food/Nutrient Intake Outcomes: Enteral Nutrition Intake/Tolerance, IVF Intake  Physical Signs/Symptoms Outcomes: Biochemical Data, Chewing or Swallowing, GI Status, Fluid Status or Edema, Hemodynamic Status, Nutrition Focused Physical Findings, Skin, Weight    Discharge Planning:     Too soon to determine     Becka Pina, ALISHA, LD  Contact: [phone]

## 2023-10-24 NOTE — PROGRESS NOTES
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 If possible, please   document in progress notes and discharge summary further specificity regarding   the type of encephalopathy:      The medical record reflects the following:  Risk Factors: cardiac arrest/ hypotension  Clinical Indicators: 10-21 Continuous EEG mild to severe encephalopathy   without evidence of seizure activity. Treatment: EEG and neuro consult. Thank you. Carolina Juarez RN, Clinical Documentation Integrity, [employer], [facility]), CRCR  Options provided:  -- Metabolic encephalopathy  -- Anoxic encephalopathy  -- Septic encephalopathy  -- Toxic encephalopathy  -- Toxic metabolic encephalopathy  -- Other - I will add my own diagnosis  -- Disagree - Not applicable / Not valid  -- Disagree - Clinically unable to determine / Unknown  -- Refer to Clinical Documentation Reviewer    PROVIDER RESPONSE TEXT:    This patient has anoxic encephalopathy. Query created by: Kenneth Ortiz on 10/23/2023 11:26 AM      QUERY TEXT:    Pt admitted with Anoxic Brain Injury:  2/2 respiratory arrest and subsequent   cardiac arrest following failed extubation 10/16 . Pt noted to have  drop in   BP into the 30's and need for Levophed drip. If possible, please document in   the progress notes and discharge summary if you are evaluating and/or treating   any of the following: The medical record reflects the following:  Risk Factors: BP dropped  Clinical Indicators: 10-20 1935 that patient's BP had dropped with MAP in the   30's.

## 2023-10-24 NOTE — PLAN OF CARE
Problem: Discharge Planning  Goal: Discharge to home or other facility with appropriate resources  Outcome: Not Progressing     Problem: Chronic Conditions and Co-morbidities  Goal: Patient's chronic conditions and co-morbidity symptoms are monitored and maintained or improved  Outcome: Not Progressing     Problem: Skin/Tissue Integrity  Goal: Absence of new skin breakdown  Description: 1. Monitor for areas of redness and/or skin breakdown  2. Assess vascular access sites hourly  3. Every 4-6 hours minimum:  Change oxygen saturation probe site  4. Every 4-6 hours:  If on nasal continuous positive airway pressure, respiratory therapy assess nares and determine need for appliance change or resting period. Outcome: Not Progressing     Problem: Neurosensory - Adult  Goal: Achieves stable or improved neurological status  Outcome: Not Progressing     Problem: Coping  Goal: Pt/Family able to verbalize concerns and demonstrate effective coping strategies  Description: INTERVENTIONS:  1. Assist patient/family to identify coping skills, available support systems and cultural and spiritual values  2. Provide emotional support, including active listening and acknowledgement of concerns of patient and caregivers  3. Reduce environmental stimuli, as able  4. Instruct patient/family in relaxation techniques, as appropriate  5.  Assess for spiritual pain/suffering and initiate Spiritual Care, Psychosocial Clinical Specialist consults as needed  Outcome: Not Progressing     Problem: Safety - Adult  Goal: Free from fall injury  Outcome: Progressing     Problem: Discharge Planning  Goal: Discharge to home or other facility with appropriate resources  Outcome: Not Progressing     Problem: Chronic Conditions and Co-morbidities  Goal: Patient's chronic conditions and co-morbidity symptoms are monitored and maintained or improved  Outcome: Not Progressing     Problem: Skin/Tissue Integrity  Goal: Absence of new skin breakdown  Description: 1. Monitor for areas of redness and/or skin breakdown  2. Assess vascular access sites hourly  3. Every 4-6 hours minimum:  Change oxygen saturation probe site  4. Every 4-6 hours:  If on nasal continuous positive airway pressure, respiratory therapy assess nares and determine need for appliance change or resting period. Outcome: Not Progressing     Problem: Neurosensory - Adult  Goal: Achieves stable or improved neurological status  Outcome: Not Progressing     Problem: Coping  Goal: Pt/Family able to verbalize concerns and demonstrate effective coping strategies  Description: INTERVENTIONS:  1. Assist patient/family to identify coping skills, available support systems and cultural and spiritual values  2. Provide emotional support, including active listening and acknowledgement of concerns of patient and caregivers  3. Reduce environmental stimuli, as able  4. Instruct patient/family in relaxation techniques, as appropriate  5. Assess for spiritual pain/suffering and initiate Spiritual Care, Psychosocial Clinical Specialist consults as needed  Outcome: Not Progressing   Care plan reviewed with patient's wife, Lindy Espinosa  Patient's wife  verbalize understanding of the plan of care and contribute to goal setting.

## 2023-10-24 NOTE — PROGRESS NOTES
IV team to room for dressing change. Primary RN Kristin Phillip stopped this RN to discuss venous doppler reports that detail extensive clotting in both arms, subclavian, and legs. PICC and arterial line in place in left arm. Arm measured. Upper arm circumference is 38. When line placed circumference was 40. Spoke to Godwin ARENAS and Dr Sea Valle who state line is okay to keep at this time as there is no where else suitable for access. Updated Kristin Phillip RN. Dressing changed using sterile technique.

## 2023-10-24 NOTE — PROGRESS NOTES
CRITICAL CARE PROGRESS NOTE      Patient:  Jeffry Earl    Unit/Bed:4D-08/008-A  YOB: 1950  MRN: [id number]   PCP: Diamond Tapia MD  Date of Admission: 10/12/2023  Chief Complaint:- tracheal stenosis s/p resection    Assessment and Plan:    Anoxic Brain Injury:  2/2 respiratory arrest and subsequent cardiac arrest following failed extubation 10/16   EEG 10/17: diffuse background attenuation and suppression w/out evidence of reactivity. No seizures. CT Head 10/17: no acute findings   MRI Brain 10/17: no acute findings   MRI Brain 10/19: No evidence of anoxia, no evidence of acute infarct   EEG 10/19: No seizures, moderate to severe encephalopathy   Continuous EEG starting 10/20   No seizures, improved compared to EEG on 10/19 and significantly improved from 10/17  3% @ 15mL/hr with goal Na 145-150 for concerns of cerebral edema, HELD 10/20   Neuro following, started Provigil 200mg   Seizure prophylaxis with Keppra and Dilantin added by neurology   On zosyn and vancomycin for surgical site infection prophylaxis   S/p cricotracheal resection, laryngoplasty with flap and vocal fold lateralization stitch placement: with Dr. Tj Kent   POD 12  HOB at 30 degrees with pillow under head for semi-flexed position   Monitor RACHEL drains, 20mL output last 24 hrs   10/18: 5.0 ET tube exchanged for 7.0 ET tube by Dr. Tj Kent   Bilateral DVTs, superficial:    Venous Duplex Lower Extremity 10/12: thrombus in BL posterior tibial veins and right peroneal vein   Patient off of heparin and started on Lovenox   Lovenox held by ENT due to blood around surgical site 10/20  Lovenox resumed 10/23  Venous Duplex 10/23: Left axillary, brachial, basilic and cephalic vein thrombus.  Right cephalic and antecubital thrombosis   DM Type 2:  01/2023 HbA1c: 5.5   Patient on 7 units Lantus nightly   High dose SSI   Chronic diastolic CHF:  History   ECHO 1/2/23: EF 60-65%  Strict I/O   Monitor for fluid overload  10/21: was discontiguous. Time does not include procedure. Time does include my direct assessment of the patient and coordination of care. Time represents more than 50% of the time involved with patient care by the [address] [place] [address] team.  Electronically signed by Ladi Valdez.  Juan Baum MD.

## 2023-10-24 NOTE — PROGRESS NOTES
, Yasmin Valiente, phoned in for condition update. States\" I'll be in later today'\"     36- Dr Anabela Cox at bedside updating  on pt condition and POC. Questions answered.

## 2023-10-24 NOTE — PROGRESS NOTES
Pt was unresponsive but was blessed.     10/24/23 1435   Encounter Summary   Service Provided For: Patient   Referral/Consult From: [facility]   Last Encounter  10/24/23  (NR)   Complexity of Encounter Low   Begin Time 1000   End Time  1005   Total Time Calculated 5 min   Spiritual/Emotional needs   Type Spiritual Support   Assessment/Intervention/Outcome   Assessment Unable to assess

## 2023-10-24 NOTE — PLAN OF CARE
Problem: Respiratory - Adult  Goal: Normal spontaneous ventilation  Outcome: Progressing     Problem: Respiratory - Adult  Goal: Achieves optimal ventilation and oxygenation  Outcome: Progressing                                                Patient Weaning Progress    The patient's vent settings was able to be weaned this shift. Ventilator settings that were weaned              [x] Mode   [x] Pressure support weaned   [] Fio2 weaned   [] Peep weaned      Spontaneous weaning trial  was attempted. The patient was able to tolerate SBT. RSBI  was 49.46 with EtCO2 of 31 and SpO2 of 98 on 25% FiO2. Spontanteous VT was 465 and RR 23 breaths/min. The patient's SBT began at [address] [place] [address]. Evac tube was  hooked up with continuous low suction(20-30mmHg)      Cuff was  deflated to determine cuff leak. A leak  was not detected. Unable to get agreement for goals because no family is present and patient cannot respond.

## 2023-10-24 NOTE — PLAN OF CARE
Problem: Respiratory - Adult  Goal: Normal spontaneous ventilation  10/24/2023 1818 by Tatum Ely RCP  Outcome: Progressing                                                  Patient Weaning Progress    The patient's vent settings were able to be weaned this shift. Ventilator settings that were weaned              [x] Mode   [] Pressure support weaned   [] Fio2 weaned   [] Peep weaned      Spontaneous weaning trial  was attempted. The patient was on SBT for 12 hours. Patient does not have an Evac ETT. Cuff was  deflated to determine cuff leak. Unable to get agreement for goals because no family is present and patient cannot respond.

## 2023-10-25 LAB
ANION GAP SERPL CALC-SCNC: 9 MEQ/L (ref 8–16)
BUN SERPL-MCNC: 20 MG/DL (ref 7–22)
CA-I BLD ISE-SCNC: 1.19 MMOL/L (ref 1.12–1.32)
CALCIUM SERPL-MCNC: 8 MG/DL (ref 8.5–10.5)
CHLORIDE SERPL-SCNC: 110 MEQ/L (ref 98–111)
CO2 SERPL-SCNC: 23 MEQ/L (ref 23–33)
CREAT SERPL-MCNC: 0.8 MG/DL (ref 0.4–1.2)
DEPRECATED RDW RBC AUTO: 53.5 FL (ref 35–45)
ERYTHROCYTE [DISTWIDTH] IN BLOOD BY AUTOMATED COUNT: 15.7 % (ref 11.5–14.5)
GFR SERPL CREATININE-BSD FRML MDRD: > 60 ML/MIN/1.73M2
GLUCOSE BLD STRIP.AUTO-MCNC: 186 MG/DL (ref 70–108)
GLUCOSE BLD STRIP.AUTO-MCNC: 198 MG/DL (ref 70–108)
GLUCOSE BLD STRIP.AUTO-MCNC: 217 MG/DL (ref 70–108)
GLUCOSE BLD STRIP.AUTO-MCNC: 229 MG/DL (ref 70–108)
GLUCOSE BLD STRIP.AUTO-MCNC: 249 MG/DL (ref 70–108)
GLUCOSE SERPL-MCNC: 217 MG/DL (ref 70–108)
HCT VFR BLD AUTO: 24.3 % (ref 37–47)
HCT VFR BLD AUTO: 24.3 % (ref 37–47)
HGB BLD-MCNC: 7.4 GM/DL (ref 12–16)
HGB BLD-MCNC: 7.5 GM/DL (ref 12–16)
MCH RBC QN AUTO: 29.1 PG (ref 26–33)
MCHC RBC AUTO-ENTMCNC: 30.5 GM/DL (ref 32.2–35.5)
MCV RBC AUTO: 95.7 FL (ref 81–99)
PHENYTOIN SERPL-MCNC: 8.1 UG/ML (ref 6–18)
PLATELET # BLD AUTO: 235 THOU/MM3 (ref 130–400)
PMV BLD AUTO: 10.8 FL (ref 9.4–12.4)
POTASSIUM SERPL-SCNC: 3.6 MEQ/L (ref 3.5–5.2)
RBC # BLD AUTO: 2.54 MILL/MM3 (ref 4.2–5.4)
SODIUM SERPL-SCNC: 142 MEQ/L (ref 135–145)
WBC # BLD AUTO: 13.8 THOU/MM3 (ref 4.8–10.8)

## 2023-10-25 PROCEDURE — 6370000000 HC RX 637 (ALT 250 FOR IP): Performed by: NURSE PRACTITIONER

## 2023-10-25 PROCEDURE — 6360000002 HC RX W HCPCS

## 2023-10-25 PROCEDURE — 2700000000 HC OXYGEN THERAPY PER DAY

## 2023-10-25 PROCEDURE — 80048 BASIC METABOLIC PNL TOTAL CA: CPT

## 2023-10-25 PROCEDURE — 37799 UNLISTED PX VASCULAR SURGERY: CPT

## 2023-10-25 PROCEDURE — P9047 ALBUMIN (HUMAN), 25%, 50ML: HCPCS | Performed by: STUDENT IN AN ORGANIZED HEALTH CARE EDUCATION/TRAINING PROGRAM

## 2023-10-25 PROCEDURE — 82330 ASSAY OF CALCIUM: CPT

## 2023-10-25 PROCEDURE — 85014 HEMATOCRIT: CPT

## 2023-10-25 PROCEDURE — 94003 VENT MGMT INPAT SUBQ DAY: CPT

## 2023-10-25 PROCEDURE — 85027 COMPLETE CBC AUTOMATED: CPT

## 2023-10-25 PROCEDURE — 2000000000 HC ICU R&B

## 2023-10-25 PROCEDURE — 85018 HEMOGLOBIN: CPT

## 2023-10-25 PROCEDURE — 2580000003 HC RX 258: Performed by: EMERGENCY MEDICINE

## 2023-10-25 PROCEDURE — 6370000000 HC RX 637 (ALT 250 FOR IP): Performed by: INTERNAL MEDICINE

## 2023-10-25 PROCEDURE — 6360000002 HC RX W HCPCS: Performed by: INTERNAL MEDICINE

## 2023-10-25 PROCEDURE — 36415 COLL VENOUS BLD VENIPUNCTURE: CPT

## 2023-10-25 PROCEDURE — 99024 POSTOP FOLLOW-UP VISIT: CPT | Performed by: PHYSICIAN ASSISTANT

## 2023-10-25 PROCEDURE — P9047 ALBUMIN (HUMAN), 25%, 50ML: HCPCS

## 2023-10-25 PROCEDURE — 6370000000 HC RX 637 (ALT 250 FOR IP): Performed by: EMERGENCY MEDICINE

## 2023-10-25 PROCEDURE — 6360000002 HC RX W HCPCS: Performed by: EMERGENCY MEDICINE

## 2023-10-25 PROCEDURE — 94761 N-INVAS EAR/PLS OXIMETRY MLT: CPT

## 2023-10-25 PROCEDURE — 6360000002 HC RX W HCPCS: Performed by: PHYSICIAN ASSISTANT

## 2023-10-25 PROCEDURE — 6370000000 HC RX 637 (ALT 250 FOR IP): Performed by: STUDENT IN AN ORGANIZED HEALTH CARE EDUCATION/TRAINING PROGRAM

## 2023-10-25 PROCEDURE — 2500000003 HC RX 250 WO HCPCS: Performed by: INTERNAL MEDICINE

## 2023-10-25 PROCEDURE — 99223 1ST HOSP IP/OBS HIGH 75: CPT | Performed by: INTERNAL MEDICINE

## 2023-10-25 PROCEDURE — 82948 REAGENT STRIP/BLOOD GLUCOSE: CPT

## 2023-10-25 PROCEDURE — 80185 ASSAY OF PHENYTOIN TOTAL: CPT

## 2023-10-25 PROCEDURE — 6360000002 HC RX W HCPCS: Performed by: STUDENT IN AN ORGANIZED HEALTH CARE EDUCATION/TRAINING PROGRAM

## 2023-10-25 PROCEDURE — 2580000003 HC RX 258: Performed by: NURSE PRACTITIONER

## 2023-10-25 RX ORDER — INSULIN GLARGINE 100 [IU]/ML
10 INJECTION, SOLUTION SUBCUTANEOUS NIGHTLY
Status: DISCONTINUED | OUTPATIENT
Start: 2023-10-25 | End: 2023-10-27

## 2023-10-25 RX ORDER — ALBUMIN (HUMAN) 12.5 G/50ML
25 SOLUTION INTRAVENOUS ONCE
Status: COMPLETED | OUTPATIENT
Start: 2023-10-25 | End: 2023-10-26

## 2023-10-25 RX ORDER — ALBUMIN (HUMAN) 12.5 G/50ML
25 SOLUTION INTRAVENOUS ONCE
Status: COMPLETED | OUTPATIENT
Start: 2023-10-25 | End: 2023-10-25

## 2023-10-25 RX ADMIN — PHENYTOIN SODIUM 50 MG: 50 INJECTION INTRAMUSCULAR; INTRAVENOUS at 08:44

## 2023-10-25 RX ADMIN — SODIUM CHLORIDE, PRESERVATIVE FREE 10 ML: 5 INJECTION INTRAVENOUS at 08:42

## 2023-10-25 RX ADMIN — SODIUM CHLORIDE, PRESERVATIVE FREE 10 ML: 5 INJECTION INTRAVENOUS at 20:16

## 2023-10-25 RX ADMIN — BUMETANIDE 0.5 MG: 0.25 INJECTION INTRAMUSCULAR; INTRAVENOUS at 17:20

## 2023-10-25 RX ADMIN — CHLORHEXIDINE GLUCONATE 0.12% ORAL RINSE 15 ML: 1.2 LIQUID ORAL at 08:42

## 2023-10-25 RX ADMIN — INSULIN LISPRO 4 UNITS: 100 INJECTION, SOLUTION INTRAVENOUS; SUBCUTANEOUS at 06:12

## 2023-10-25 RX ADMIN — INSULIN GLARGINE 10 UNITS: 100 INJECTION, SOLUTION SUBCUTANEOUS at 21:08

## 2023-10-25 RX ADMIN — PHENYTOIN SODIUM 150 MG: 50 INJECTION INTRAMUSCULAR; INTRAVENOUS at 08:42

## 2023-10-25 RX ADMIN — PHENYTOIN SODIUM 150 MG: 50 INJECTION INTRAMUSCULAR; INTRAVENOUS at 21:08

## 2023-10-25 RX ADMIN — CHLORHEXIDINE GLUCONATE 0.12% ORAL RINSE 15 ML: 1.2 LIQUID ORAL at 20:16

## 2023-10-25 RX ADMIN — ALBUMIN (HUMAN) 25 G: 0.25 INJECTION, SOLUTION INTRAVENOUS at 10:23

## 2023-10-25 RX ADMIN — LEVETIRACETAM 1000 MG: 100 INJECTION, SOLUTION INTRAVENOUS at 05:57

## 2023-10-25 RX ADMIN — INSULIN LISPRO 4 UNITS: 100 INJECTION, SOLUTION INTRAVENOUS; SUBCUTANEOUS at 23:05

## 2023-10-25 RX ADMIN — FAMOTIDINE 20 MG: 20 TABLET ORAL at 21:08

## 2023-10-25 RX ADMIN — MODAFINIL 200 MG: 100 TABLET ORAL at 08:42

## 2023-10-25 RX ADMIN — ALBUMIN (HUMAN) 25 G: 0.25 INJECTION, SOLUTION INTRAVENOUS at 23:28

## 2023-10-25 RX ADMIN — LEVETIRACETAM 1000 MG: 100 INJECTION, SOLUTION INTRAVENOUS at 17:23

## 2023-10-25 RX ADMIN — INSULIN LISPRO 4 UNITS: 100 INJECTION, SOLUTION INTRAVENOUS; SUBCUTANEOUS at 00:18

## 2023-10-25 RX ADMIN — ENOXAPARIN SODIUM 70 MG: 100 INJECTION SUBCUTANEOUS at 17:25

## 2023-10-25 RX ADMIN — FAMOTIDINE 20 MG: 20 TABLET ORAL at 08:42

## 2023-10-25 RX ADMIN — ENOXAPARIN SODIUM 70 MG: 100 INJECTION SUBCUTANEOUS at 05:56

## 2023-10-25 ASSESSMENT — PULMONARY FUNCTION TESTS
PIF_VALUE: 15
PIF_VALUE: 15
PIF_VALUE: 16
PIF_VALUE: 15

## 2023-10-25 NOTE — CARE COORDINATION
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 There is also nonocclusive thrombus within the left posterior tibial vein  10/16 Extubated to bipap  10/16 Code Blue  10/16 Reintubated  10/16 Bronch: thick bloody secretions in RLL removed  10/16 CXR: Mild stable cardiomegaly with pulmonary vascular congestion suspicious for congestive heart failure; Prominent pulmonary interstitium suspicious for pulmonary edema  10/17 5 hr EEG: No seizures noted; diffuse background attenuation and suppression without evidence of reactivity; severe encephalopathy  10/17 CT Head: Stable CT appearance the brain. No acute findings  10/17 MRI Brain: No acute findings  95/50 PICC left basilic  01/56 CT Head: No acute findings  10/18 OR: ET Exchange bronhoscopy and lavage  10/19 4hr 40 min EEG: This EEG was abnormal. The background abnormalities indicated a moderate to severe encephalopathy, nonspecific to etiology. The generalized periodic discharges (GPDs) indicated underlying cortical irritability/increased risk of seizures, and can be seen in various encephalopathies. No seizures were recorded  10/19 MRI Brain: No evidence of an acute infarct. No evidence of anoxia; Stable mild severity chronic small vessel ischemic changes; Global volume loss  10/23 4-day EEG: Moderate to severe encephalopathy, improved as recording progressed.  Frequent and at times near continuous runs of GPDs with often typical and at times atypical triphasic

## 2023-10-25 NOTE — PLAN OF CARE
Problem: Respiratory - Adult  Goal: Normal spontaneous ventilation  10/25/2023 0622 by Gurjit Hernandes RCP  Outcome: Progressing     Problem: Respiratory - Adult  Goal: Achieves optimal ventilation and oxygenation  10/25/2023 0622 by Gurjit Hernandes RCP  Outcome: Progressing                                                Patient Weaning Progress    The patient's vent settings was able to be weaned this shift. Ventilator settings that were weaned              [x] Mode   [] Pressure support weaned   [] Fio2 weaned   [] Peep weaned      Spontaneous weaning trial  was attempted. The patient was able to tolerate SBT. RSBI  was 40.98 with EtCO2 of 32 and SpO2 of 96 on 25% FiO2. Spontanteous VT was 488 and RR 20 breaths/min. The patient's SBT began at 0430. Evac tube was  hooked up with continuous low suction(20-30mmHg)      Cuff was not  deflated to determine cuff leak. Unable to get agreement for goals because no family is present and patient cannot respond.

## 2023-10-25 NOTE — PLAN OF CARE
Problem: Discharge Planning  Goal: Discharge to home or other facility with appropriate resources  10/25/2023 1101 by Yumiko Sanders RN  Outcome: Progressing  Flowsheets (Taken 10/25/2023 0800)  Discharge to home or other facility with appropriate resources:   Identify barriers to discharge with patient and caregiver   Arrange for needed discharge resources and transportation as appropriate   Identify discharge learning needs (meds, wound care, etc)   Refer to discharge planning if patient needs post-hospital services based on physician order or complex needs related to functional status, cognitive ability or social support system     Problem: Chronic Conditions and Co-morbidities  Goal: Patient's chronic conditions and co-morbidity symptoms are monitored and maintained or improved  10/25/2023 1101 by Yumiko Sanders RN  Outcome: Progressing  Flowsheets (Taken 10/25/2023 0800)  Care Plan - Patient's Chronic Conditions and Co-Morbidity Symptoms are Monitored and Maintained or Improved:   Monitor and assess patient's chronic conditions and comorbid symptoms for stability, deterioration, or improvement   Collaborate with multidisciplinary team to address chronic and comorbid conditions and prevent exacerbation or deterioration   Update acute care plan with appropriate goals if chronic or comorbid symptoms are exacerbated and prevent overall improvement and discharge     Problem: Safety - Adult  Goal: Free from fall injury  10/25/2023 1101 by Yumiko Sanders RN  Outcome: Progressing  Flowsheets (Taken 10/25/2023 1101)  Free From Fall Injury: Instruct family/caregiver on patient safety     Problem: Pain  Goal: Verbalizes/displays adequate comfort level or baseline comfort level  10/25/2023 1101 by Yumiko Sanders RN  Outcome: Progressing  Flowsheets (Taken 10/25/2023 1101)  Verbalizes/displays adequate comfort level or baseline comfort level:   Assess pain using appropriate pain scale   Administer 20

## 2023-10-25 NOTE — PLAN OF CARE
Problem: Discharge Planning  Goal: Discharge to home or other facility with appropriate resources  10/25/2023 0125 by Fidencio Castro RN  Outcome: Progressing  Flowsheets (Taken 10/25/2023 0125)  Discharge to home or other facility with appropriate resources:   Identify barriers to discharge with patient and caregiver   Arrange for needed discharge resources and transportation as appropriate  10/24/2023 1541 by Bishop Karan RN  Outcome: Not Progressing     Problem: Neurosensory - Adult  Goal: Achieves stable or improved neurological status  10/25/2023 0125 by Fidencio Castro RN  Outcome: Progressing  Flowsheets (Taken 10/25/2023 0125)  Achieves stable or improved neurological status: Assess for and report changes in neurological status  10/24/2023 1541 by Bishop Karan RN  Outcome: Not Progressing  Goal: Absence of seizures  10/25/2023 0125 by Fidencio Castro RN  Outcome: Progressing  10/24/2023 1541 by Bishop Karan RN  Outcome: Progressing     Problem: Skin/Tissue Integrity - Adult  Goal: Incisions, wounds, or drain sites healing without S/S of infection  10/25/2023 0125 by Fidencio Castro RN  Outcome: Progressing  10/24/2023 1541 by Bishop Karan RN  Outcome: Progressing     Problem: Safety - Adult  Goal: Free from fall injury  10/25/2023 0125 by Fidencio Castro RN  Outcome: Progressing  Flowsheets (Taken 10/25/2023 0125)  Free From Fall Injury: Instruct family/caregiver on patient safety  10/24/2023 1541 by Bishop Karan RN  Outcome: Progressing     Problem: Discharge Planning  Goal: Discharge to home or other facility with appropriate resources  10/25/2023 0125 by Fidencio Castro RN  Outcome: Progressing  Flowsheets (Taken 10/25/2023 0125)  Discharge to home or other facility with appropriate resources:   Identify barriers to discharge with patient and caregiver   Arrange for needed discharge resources and transportation as appropriate  10/24/2023 1541 by Jia techniques, as appropriate  5.  Assess for spiritual pain/suffering and initiate Spiritual Care, Psychosocial Clinical Specialist consults as needed  10/25/2023 0125 by Fidencio Castro RN  Outcome: Progressing  Flowsheets (Taken 10/25/2023 0125)  Patient/family able to verbalize anxieties, fears, and concerns, and demonstrate effective coping: Provide emotional support, including active listening and acknowledgement of concerns of patient and caregivers  10/24/2023 1541 by Bishop Russo RN  Outcome: Not Progressing

## 2023-10-25 NOTE — PROGRESS NOTES
Patient Weaning Progress    The patient's vent settings was able to be weaned this shift. Ventilator settings that were weaned              [x] Mode   [] Pressure support weaned   [] Fio2 weaned   [] Peep weaned      Spontaneous weaning trial  was attempted. due to defined parameters for SBT (spontaneous breathing trial) not being met. Reason that defined ventilator parameters for SBT was not met              [] Patient condition requires increased ventilator settings  [] Requires increased sedation   [] Settings not within weaning range   [] SAT not completed   [] Physician orders    The patient was able to tolerate SBT. RSBI  was 46 with EtCO2 of 32 and SpO2 of 95 on 30% FiO2. Spontanteous VT was 413 and RR 19 breaths/min. The patient was on SBT for over 120 min minutes     Evac tube was not  hooked up with continuous low suction(20-30mmHg)          Unable to get agreement for goals because no family is present and patient cannot respond.

## 2023-10-25 NOTE — PLAN OF CARE
Problem: Respiratory - Adult  Goal: Achieves optimal ventilation and oxygenation  10/25/2023 0807 by Anthony Ling RCP  Flowsheets (Taken 10/25/2023 9198)  Achieves optimal ventilation and oxygenation:   Assess for changes in respiratory status   Assess the need for suctioning and aspirate as needed   Respiratory therapy support as indicated   Assess for changes in mentation and behavior   Assess and instruct to report shortness of breath or any respiratory difficulty

## 2023-10-26 LAB
ABO: NORMAL
ANION GAP SERPL CALC-SCNC: 9 MEQ/L (ref 8–16)
BUN SERPL-MCNC: 21 MG/DL (ref 7–22)
CALCIUM SERPL-MCNC: 8.3 MG/DL (ref 8.5–10.5)
CHLORIDE SERPL-SCNC: 108 MEQ/L (ref 98–111)
CO2 SERPL-SCNC: 23 MEQ/L (ref 23–33)
CREAT SERPL-MCNC: 0.6 MG/DL (ref 0.4–1.2)
DEPRECATED RDW RBC AUTO: 53.8 FL (ref 35–45)
ERYTHROCYTE [DISTWIDTH] IN BLOOD BY AUTOMATED COUNT: 15.9 % (ref 11.5–14.5)
GFR SERPL CREATININE-BSD FRML MDRD: > 60 ML/MIN/1.73M2
GLUCOSE BLD STRIP.AUTO-MCNC: 164 MG/DL (ref 70–108)
GLUCOSE BLD STRIP.AUTO-MCNC: 176 MG/DL (ref 70–108)
GLUCOSE BLD STRIP.AUTO-MCNC: 185 MG/DL (ref 70–108)
GLUCOSE BLD STRIP.AUTO-MCNC: 199 MG/DL (ref 70–108)
GLUCOSE SERPL-MCNC: 200 MG/DL (ref 70–108)
HCT VFR BLD AUTO: 21.6 % (ref 37–47)
HCT VFR BLD AUTO: 25.7 % (ref 37–47)
HGB BLD-MCNC: 6.7 GM/DL (ref 12–16)
HGB BLD-MCNC: 8 GM/DL (ref 12–16)
IAT IGG-SP REAG SERPL QL: NORMAL
MCH RBC QN AUTO: 29.8 PG (ref 26–33)
MCHC RBC AUTO-ENTMCNC: 31 GM/DL (ref 32.2–35.5)
MCV RBC AUTO: 96 FL (ref 81–99)
PATHOLOGIST REVIEW: ABNORMAL
PLATELET # BLD AUTO: 200 THOU/MM3 (ref 130–400)
PMV BLD AUTO: 10.8 FL (ref 9.4–12.4)
POTASSIUM SERPL-SCNC: 3.3 MEQ/L (ref 3.5–5.2)
POTASSIUM SERPL-SCNC: 4 MEQ/L (ref 3.5–5.2)
RBC # BLD AUTO: 2.25 MILL/MM3 (ref 4.2–5.4)
RH FACTOR: NORMAL
SCAN OF BLOOD SMEAR: NORMAL
SODIUM SERPL-SCNC: 140 MEQ/L (ref 135–145)
WBC # BLD AUTO: 9.1 THOU/MM3 (ref 4.8–10.8)

## 2023-10-26 PROCEDURE — 37799 UNLISTED PX VASCULAR SURGERY: CPT

## 2023-10-26 PROCEDURE — 6370000000 HC RX 637 (ALT 250 FOR IP): Performed by: STUDENT IN AN ORGANIZED HEALTH CARE EDUCATION/TRAINING PROGRAM

## 2023-10-26 PROCEDURE — 36430 TRANSFUSION BLD/BLD COMPNT: CPT

## 2023-10-26 PROCEDURE — 86923 COMPATIBILITY TEST ELECTRIC: CPT

## 2023-10-26 PROCEDURE — 86901 BLOOD TYPING SEROLOGIC RH(D): CPT

## 2023-10-26 PROCEDURE — 6370000000 HC RX 637 (ALT 250 FOR IP): Performed by: EMERGENCY MEDICINE

## 2023-10-26 PROCEDURE — 6360000002 HC RX W HCPCS: Performed by: PHYSICIAN ASSISTANT

## 2023-10-26 PROCEDURE — 6370000000 HC RX 637 (ALT 250 FOR IP): Performed by: NURSE PRACTITIONER

## 2023-10-26 PROCEDURE — 30243N1 TRANSFUSION OF NONAUTOLOGOUS RED BLOOD CELLS INTO CENTRAL VEIN, PERCUTANEOUS APPROACH: ICD-10-PCS | Performed by: INTERNAL MEDICINE

## 2023-10-26 PROCEDURE — 99024 POSTOP FOLLOW-UP VISIT: CPT | Performed by: PHYSICIAN ASSISTANT

## 2023-10-26 PROCEDURE — 85014 HEMATOCRIT: CPT

## 2023-10-26 PROCEDURE — P9016 RBC LEUKOCYTES REDUCED: HCPCS

## 2023-10-26 PROCEDURE — 85027 COMPLETE CBC AUTOMATED: CPT

## 2023-10-26 PROCEDURE — 2580000003 HC RX 258: Performed by: NURSE PRACTITIONER

## 2023-10-26 PROCEDURE — 6360000002 HC RX W HCPCS: Performed by: EMERGENCY MEDICINE

## 2023-10-26 PROCEDURE — 6370000000 HC RX 637 (ALT 250 FOR IP)

## 2023-10-26 PROCEDURE — 94003 VENT MGMT INPAT SUBQ DAY: CPT

## 2023-10-26 PROCEDURE — 86885 COOMBS TEST INDIRECT QUAL: CPT

## 2023-10-26 PROCEDURE — 85018 HEMOGLOBIN: CPT

## 2023-10-26 PROCEDURE — 86900 BLOOD TYPING SEROLOGIC ABO: CPT

## 2023-10-26 PROCEDURE — 80048 BASIC METABOLIC PNL TOTAL CA: CPT

## 2023-10-26 PROCEDURE — 84132 ASSAY OF SERUM POTASSIUM: CPT

## 2023-10-26 PROCEDURE — 36415 COLL VENOUS BLD VENIPUNCTURE: CPT

## 2023-10-26 PROCEDURE — 82948 REAGENT STRIP/BLOOD GLUCOSE: CPT

## 2023-10-26 PROCEDURE — 2580000003 HC RX 258: Performed by: EMERGENCY MEDICINE

## 2023-10-26 PROCEDURE — 6360000002 HC RX W HCPCS

## 2023-10-26 PROCEDURE — 99232 SBSQ HOSP IP/OBS MODERATE 35: CPT | Performed by: INTERNAL MEDICINE

## 2023-10-26 PROCEDURE — 94761 N-INVAS EAR/PLS OXIMETRY MLT: CPT

## 2023-10-26 PROCEDURE — 2500000003 HC RX 250 WO HCPCS: Performed by: INTERNAL MEDICINE

## 2023-10-26 PROCEDURE — 2000000000 HC ICU R&B

## 2023-10-26 RX ORDER — SODIUM CHLORIDE 9 MG/ML
INJECTION, SOLUTION INTRAVENOUS PRN
Status: DISCONTINUED | OUTPATIENT
Start: 2023-10-26 | End: 2023-11-03

## 2023-10-26 RX ADMIN — FAMOTIDINE 20 MG: 20 TABLET ORAL at 20:37

## 2023-10-26 RX ADMIN — CALCIUM POLYCARBOPHIL 625 MG: 625 TABLET, FILM COATED ORAL at 09:02

## 2023-10-26 RX ADMIN — PHENYTOIN SODIUM 150 MG: 50 INJECTION INTRAMUSCULAR; INTRAVENOUS at 09:03

## 2023-10-26 RX ADMIN — BUMETANIDE 0.5 MG: 0.25 INJECTION INTRAMUSCULAR; INTRAVENOUS at 18:06

## 2023-10-26 RX ADMIN — MODAFINIL 200 MG: 100 TABLET ORAL at 09:01

## 2023-10-26 RX ADMIN — PHENYTOIN SODIUM 50 MG: 50 INJECTION INTRAMUSCULAR; INTRAVENOUS at 09:13

## 2023-10-26 RX ADMIN — CHLORHEXIDINE GLUCONATE 0.12% ORAL RINSE 15 ML: 1.2 LIQUID ORAL at 20:16

## 2023-10-26 RX ADMIN — PHENYTOIN SODIUM 150 MG: 50 INJECTION INTRAMUSCULAR; INTRAVENOUS at 20:37

## 2023-10-26 RX ADMIN — INSULIN GLARGINE 10 UNITS: 100 INJECTION, SOLUTION SUBCUTANEOUS at 20:46

## 2023-10-26 RX ADMIN — SODIUM CHLORIDE, PRESERVATIVE FREE 10 ML: 5 INJECTION INTRAVENOUS at 20:16

## 2023-10-26 RX ADMIN — SODIUM CHLORIDE, PRESERVATIVE FREE 10 ML: 5 INJECTION INTRAVENOUS at 09:22

## 2023-10-26 RX ADMIN — CHLORHEXIDINE GLUCONATE 0.12% ORAL RINSE 15 ML: 1.2 LIQUID ORAL at 09:01

## 2023-10-26 RX ADMIN — POTASSIUM CHLORIDE 20 MEQ: 29.8 INJECTION, SOLUTION INTRAVENOUS at 06:07

## 2023-10-26 RX ADMIN — FAMOTIDINE 20 MG: 20 TABLET ORAL at 09:01

## 2023-10-26 RX ADMIN — LEVETIRACETAM 1000 MG: 100 INJECTION, SOLUTION INTRAVENOUS at 05:34

## 2023-10-26 RX ADMIN — POTASSIUM CHLORIDE 20 MEQ: 29.8 INJECTION, SOLUTION INTRAVENOUS at 05:27

## 2023-10-26 RX ADMIN — LEVETIRACETAM 1000 MG: 100 INJECTION, SOLUTION INTRAVENOUS at 17:55

## 2023-10-26 ASSESSMENT — PULMONARY FUNCTION TESTS
PIF_VALUE: 15
PIF_VALUE: 15
PIF_VALUE: 16
PIF_VALUE: 15
PIF_VALUE: 14
PIF_VALUE: 14

## 2023-10-26 NOTE — CARE COORDINATION
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 There is also nonocclusive thrombus within the left posterior tibial vein  10/16 Extubated to bipap  10/16 Code Blue  10/16 Reintubated  10/16 Bronch: thick bloody secretions in RLL removed  10/16 CXR: Mild stable cardiomegaly with pulmonary vascular congestion suspicious for congestive heart failure; Prominent pulmonary interstitium suspicious for pulmonary edema  10/17 5 hr EEG: No seizures noted; diffuse background attenuation and suppression without evidence of reactivity; severe encephalopathy  10/17 CT Head: Stable CT appearance the brain. No acute findings  10/17 MRI Brain: No acute findings  53/18 PICC left basilic  71/25 CT Head: No acute findings  10/18 OR: ET Exchange bronhoscopy and lavage  10/19 4hr 40 min EEG: This EEG was abnormal. The background abnormalities indicated a moderate to severe encephalopathy, nonspecific to etiology. The generalized periodic discharges (GPDs) indicated underlying cortical irritability/increased risk of seizures, and can be seen in various encephalopathies. No seizures were recorded  10/19 MRI Brain: No evidence of an acute infarct. No evidence of anoxia; Stable mild severity chronic small vessel ischemic changes; Global volume loss  10/23 4-day EEG: Moderate to severe encephalopathy, improved as recording progressed.  Frequent and at times near continuous runs of GPDs with often typical and at times atypical triphasic

## 2023-10-26 NOTE — PROGRESS NOTES
CRITICAL CARE PROGRESS NOTE      Patient:  Samy Camarena    Unit/Bed:4D-08/008-A  YOB: 1950  MRN: [id number]   PCP: Laura Tripp MD  Date of Admission: 10/12/2023  Chief Complaint:- tracheal stenosis s/p resection    Assessment and Plan:    Anoxic Brain Injury:  2/2 respiratory arrest and subsequent cardiac arrest following failed extubation 10/16   EEG 10/17: diffuse background attenuation and suppression w/out evidence of reactivity. No seizures. CT Head 10/17: no acute findings   MRI Brain 10/17: no acute findings   MRI Brain 10/19: No evidence of anoxia, no evidence of acute infarct   EEG 10/19: No seizures, moderate to severe encephalopathy   Continuous EEG starting 10/20   No seizures, improved compared to EEG on 10/19 and significantly improved from 10/17  3% @ 15mL/hr with goal Na 145-150 for concerns of cerebral edema, HELD 10/20   Neuro following, started Provigil 200mg   Seizure prophylaxis with Keppra and Dilantin added by neurology   On zosyn and vancomycin for surgical site infection prophylaxis   Zosyn and Vancomycin D/C 10/24   S/p cricotracheal resection, laryngoplasty with flap and vocal fold lateralization stitch placement: with Dr. Gutierrez Severe   POD 14  HOB at 30 degrees with pillow under head for semi-flexed position   Monitor RACHEL drains, 5mL output last 24 hrs   10/18: 5.0 ET tube exchanged for 7.0 ET tube by Dr. Gutierrez Severe   Bilateral DVTs, superficial:    Venous Duplex Lower Extremity 10/12: thrombus in BL posterior tibial veins and right peroneal vein   Patient off of heparin and started on Lovenox   Lovenox held by ENT due to blood around surgical site 10/20  Lovenox resumed 10/23  Venous Duplex Upper Extremities 10/23: Left axillary, brachial, basilic and cephalic vein thrombus.  Right cephalic and antecubital thrombosis   UTI:   Moderate leukocytes, >100 WBC, many budding yeast   Patient has been on vanc and zosyn for some time, will D/C  Patient febrile 10/24, Rectal tube ordered. Venous duplex yesterday demonstrated bilateral UE DVT. Patients lovenox resumed. Patient flexes her lower extremities to pain. Continue to monitor for neurological recovery. 10/25: No more watery bowel movements. Rectal tube removed. Patient flexing her lower extremities to pain. Flexed her LUE to pain today. Afebrile overnight. WBC down from yesterday. Continue to monitor for neurological recovery. 10/26: No acute events overnight. Patients Hb dropped to 6.7 from 7.4 overnight. Patients  called for consent to give blood. Consent obtained and 1 unit PRBC ordered. Patients lovenox held. No signs of active bleeding. Past Medical History:  Obtained from chart review due to patient being intubated. Arthritis, CAD, diabetic neuropathy, DVT, hyperlipidemia, hypertension, type 2 diabetes. Family History: Father: Parkinson's disease, lung cancer. Social History: Denies any smoking history, no recent alcohol use. ROS   Unable to be obtained due to patient being intubated. Scheduled Meds:   insulin glargine  10 Units SubCUTAneous Nightly    insulin lispro  0-16 Units SubCUTAneous Q6H    [Held by provider] enoxaparin  70 mg SubCUTAneous Q12H    phenytoin  150 mg IntraVENous Q12H    phenytoin  50 mg IntraVENous Daily    [Held by provider] carvedilol  6.25 mg Orogastric BID WC    [Held by provider] amLODIPine  5 mg Orogastric Daily    bumetanide  0.5 mg IntraVENous Q24H    famotidine  20 mg Oral BID    modafinil  200 mg Oral Daily    scopolamine  1 patch TransDERmal Q72H    levETIRAcetam  1,000 mg IntraVENous Q12H    sodium chloride flush  5-40 mL IntraVENous 2 times per day    chlorhexidine  15 mL Mouth/Throat BID     Continuous Infusions:   sodium chloride      norepinephrine Stopped (10/21/23 0131)    sodium chloride 10 mL/hr at 10/26/23 0042    dextrose         PHYSICAL EXAMINATION:  T:  97.9.  P:  83. RR:  20. B/P:  100/49.   FiO2:  25. O2 Sat:  96.  I/O:  + 2,852,

## 2023-10-26 NOTE — PLAN OF CARE
Problem: Respiratory - Adult  Goal: Normal spontaneous ventilation  Outcome: Progressing     Problem: Respiratory - Adult  Goal: Achieves optimal ventilation and oxygenation  10/26/2023 0027 by Dimple Mir RCP  Outcome: Progressing                                                  Patient Weaning Progress    The patient's vent settings was able to be weaned this shift. Ventilator settings that were weaned              [x] Mode   [] Pressure support weaned   [x] Fio2 weaned   [] Peep weaned      Spontaneous weaning trial  was attempted. Reason that defined ventilator parameters for SBT was not met              [] Patient condition requires increased ventilator settings  [] Requires increased sedation   [] Settings not within weaning range   [] SAT not completed   [] Physician orders    The patient was able to tolerate SBT. RSBI  was 33 with EtCO2 of 35 and SpO2 of 94 on 21% FiO2. Spontanteous VT was 474 and RR 16 breaths/min. The patient was placed on SBT @@ 0509 am    Evac tube was not  hooked up with continuous low suction(20-30mmHg)      Cuff was not  deflated to determine cuff leak. Unable to get agreement for goals because no family is present and patient cannot respond.

## 2023-10-26 NOTE — PROGRESS NOTES
Pt was unresponsive but was blessed.     10/26/23 1333   Encounter Summary   Service Provided For: Patient   Referral/Consult From: [facility]   Last Encounter  10/26/23   Complexity of Encounter Low   Begin Time 0935   End Time  0942   Total Time Calculated 7 min   Spiritual/Emotional needs   Type Spiritual Support   Assessment/Intervention/Outcome   Assessment Unable to assess

## 2023-10-26 NOTE — SIGNIFICANT EVENT
ICU Significant Event Note  Notified by RN at approximately 11 PM the patient borderline hypotensive. Per report, patient responded well to albumin earlier in the day. Physical exam notes the patient edematous. One-time dose albumin 25 g ordered. Blood pressure responded well with MAP continuing to be greater than 65.     Electronically signed by Faustino Sánchez DO on 10/26/2023 at 2:44 AM

## 2023-10-26 NOTE — CONSENT
Informed Consent for Blood Component Transfusion Note    I have discussed with the  the rationale for blood component transfusion; its benefits in treating or preventing fatigue, organ damage, or death; and its risk which includes mild transfusion reactions, rare risk of blood borne infection, or more serious but rare reactions. I have discussed the alternatives to transfusion, including the risk and consequences of not receiving transfusion. The  had an opportunity to ask questions and had agreed to proceed with transfusion of blood components.     Electronically signed by Ronn Harmon MD on 10/26/23 at 6:49 AM EDT

## 2023-10-26 NOTE — FLOWSHEET NOTE
0800 Mrs Madeleine Dubon rests in the bed without apparent distress. She remains intubated and off sedation. She has a 7.0 at 24 cm to her lips. She has a temp sensing werner patent to gravity. She has a 3 lumen PICC left AC site clear. She has 2 RACHEL drains on either side of her neck. These are compressed at 50%. She has an OGT in place and receiving tube feeding.

## 2023-10-26 NOTE — PLAN OF CARE
Problem: Discharge Planning  Goal: Discharge to home or other facility with appropriate resources  10/25/2023 2141 by Mode Gardiner, RN  Outcome: Progressing  Flowsheets (Taken 10/25/2023 2141)  Discharge to home or other facility with appropriate resources:   Identify barriers to discharge with patient and caregiver   Identify discharge learning needs (meds, wound care, etc)   Refer to discharge planning if patient needs post-hospital services based on physician order or complex needs related to functional status, cognitive ability or social support system   Arrange for needed discharge resources and transportation as appropriate  10/25/2023 1101 by Alycia Jack RN  Outcome: Progressing  Flowsheets (Taken 10/25/2023 0800)  Discharge to home or other facility with appropriate resources:   Identify barriers to discharge with patient and caregiver   Arrange for needed discharge resources and transportation as appropriate   Identify discharge learning needs (meds, wound care, etc)   Refer to discharge planning if patient needs post-hospital services based on physician order or complex needs related to functional status, cognitive ability or social support system     Problem: Chronic Conditions and Co-morbidities  Goal: Patient's chronic conditions and co-morbidity symptoms are monitored and maintained or improved  10/25/2023 2141 by Mode Gardiner, RN  Outcome: Progressing  [address] (Taken 10/25/2023 2141)  Care Plan - Patient's Chronic Conditions and Co-Morbidity Symptoms are Monitored and Maintained or Improved:   Monitor and assess patient's chronic conditions and comorbid symptoms for stability, deterioration, or improvement   Collaborate with multidisciplinary team to address chronic and comorbid conditions and prevent exacerbation or deterioration   Update acute care plan with appropriate goals if chronic or comorbid symptoms are exacerbated and prevent overall improvement and discharge  10/25/2023 Assess pain using appropriate pain scale   Administer analgesics based on type and severity of pain and evaluate response   Implement non-pharmacological measures as appropriate and evaluate response   Notify Licensed Independent Practitioner if interventions unsuccessful or patient reports new pain     Problem: Respiratory - Adult  Goal: Achieves optimal ventilation and oxygenation  10/25/2023 2141 by Dinh Wong RN  Outcome: Progressing  Flowsheets (Taken 10/25/2023 2141)  Achieves optimal ventilation and oxygenation:   Assess for changes in respiratory status   Respiratory therapy support as indicated   Assess the need for suctioning and aspirate as needed   Position to facilitate oxygenation and minimize respiratory effort   Oxygen supplementation based on oxygen saturation or arterial blood gases  10/25/2023 1101 by Louis Gordon RN  Outcome: Progressing  Flowsheets (Taken 10/25/2023 0807 by David Perez RCP)  Achieves optimal ventilation and oxygenation:   Assess for changes in respiratory status   Assess the need for suctioning and aspirate as needed   Respiratory therapy support as indicated   Assess for changes in mentation and behavior   Assess and instruct to report shortness of breath or any respiratory difficulty  10/25/2023 0807 by David Perez RCP  Flowsheets  Taken 10/25/2023 0807 by David Perez RCP  Achieves optimal ventilation and oxygenation:   Assess for changes in respiratory status   Assess the need for suctioning and aspirate as needed   Respiratory therapy support as indicated   Assess for changes in mentation and behavior   Assess and instruct to report shortness of breath or any respiratory difficulty  Taken 10/25/2023 0800 by Louis Gordon RN  Achieves optimal ventilation and oxygenation:   Assess for changes in respiratory status   Assess for changes in mentation and behavior   Position to facilitate oxygenation and minimize respiratory effort   Oxygen remains patent:   Assess patency of urinary catheter   Irrigate catheter per Licensed Independent Practitioner order if indicated and notify Licensed Independent Practitioner if unable to irrigate     Problem: Coping  Goal: Pt/Family able to verbalize concerns and demonstrate effective coping strategies  Description: INTERVENTIONS:  1. Assist patient/family to identify coping skills, available support systems and cultural and spiritual values  2. Provide emotional support, including active listening and acknowledgement of concerns of patient and caregivers  3. Reduce environmental stimuli, as able  4. Instruct patient/family in relaxation techniques, as appropriate  5.  Assess for spiritual pain/suffering and initiate Spiritual Care, Psychosocial Clinical Specialist consults as needed  10/25/2023 2141 by Mode Gardiner RN  Outcome: Progressing  Flowsheets (Taken 10/25/2023 2141)  Patient/family able to verbalize anxieties, fears, and concerns, and demonstrate effective coping:   Assist patient/family to identify coping skills, available support systems and cultural and spiritual values   Reduce environmental stimuli, as able   Assess for spiritual pain/suffering and initiate Spiritual Care, Psychosocial Clinical Specialist consults as needed  10/25/2023 1101 by Alycia Jack RN  Outcome: Progressing  Flowsheets (Taken 10/25/2023 0800)  Patient/family able to verbalize anxieties, fears, and concerns, and demonstrate effective coping:   Provide emotional support, including active listening and acknowledgement of concerns of patient and caregivers   Reduce environmental stimuli, as able   Instruct patient/family in relaxation techniques, as appropriate

## 2023-10-26 NOTE — PLAN OF CARE
Problem: Discharge Planning  Goal: Discharge to home or other facility with appropriate resources  Outcome: Progressing  Flowsheets (Taken 10/26/2023 1707)  Discharge to home or other facility with appropriate resources:   Identify barriers to discharge with patient and caregiver   Arrange for needed discharge resources and transportation as appropriate   Identify discharge learning needs (meds, wound care, etc)     Problem: Chronic Conditions and Co-morbidities  Goal: Patient's chronic conditions and co-morbidity symptoms are monitored and maintained or improved  Outcome: Progressing  Flowsheets (Taken 10/26/2023 1707)  Care Plan - Patient's Chronic Conditions and Co-Morbidity Symptoms are Monitored and Maintained or Improved:   Monitor and assess patient's chronic conditions and comorbid symptoms for stability, deterioration, or improvement   Collaborate with multidisciplinary team to address chronic and comorbid conditions and prevent exacerbation or deterioration   Update acute care plan with appropriate goals if chronic or comorbid symptoms are exacerbated and prevent overall improvement and discharge     Problem: Safety - Adult  Goal: Free from fall injury  Outcome: Progressing  Flowsheets (Taken 10/26/2023 1707)  Free From Fall Injury:   Instruct family/caregiver on patient safety   Based on caregiver fall risk screen, instruct family/caregiver to ask for assistance with transferring infant if caregiver noted to have fall risk factors     Problem: Pain  Goal: Verbalizes/displays adequate comfort level or baseline comfort level  Outcome: Progressing  Flowsheets (Taken 10/26/2023 1707)  Verbalizes/displays adequate comfort level or baseline comfort level:   Encourage patient to monitor pain and request assistance   Assess pain using appropriate pain scale   Administer analgesics based on type and severity of pain and evaluate response   Implement non-pharmacological measures as appropriate and evaluate response     Problem: Respiratory - Adult  Goal: Achieves optimal ventilation and oxygenation  Outcome: Progressing  Flowsheets (Taken 10/26/2023 1707)  Achieves optimal ventilation and oxygenation:   Assess for changes in respiratory status   Assess for changes in mentation and behavior   Position to facilitate oxygenation and minimize respiratory effort   Oxygen supplementation based on oxygen saturation or arterial blood gases   Initiate smoking cessation protocol as indicated   Encourage broncho-pulmonary hygiene including cough, deep breathe, incentive spirometry   Assess the need for suctioning and aspirate as needed     Problem: Nutrition Deficit:  Goal: Optimize nutritional status  Outcome: Progressing  Flowsheets (Taken 10/26/2023 1707)  Nutrient intake appropriate for improving, restoring, or maintaining nutritional needs:   Assess nutritional status and recommend course of action   Monitor oral intake, labs, and treatment plans   Recommend appropriate diets, oral nutritional supplements, and vitamin/mineral supplements   Order, calculate, and assess calorie counts as needed     Problem: Skin/Tissue Integrity  Goal: Absence of new skin breakdown  Description: 1. Monitor for areas of redness and/or skin breakdown  2. Assess vascular access sites hourly  3. Every 4-6 hours minimum:  Change oxygen saturation probe site  4. Every 4-6 hours:  If on nasal continuous positive airway pressure, respiratory therapy assess nares and determine need for appliance change or resting period.   Outcome: Progressing  Note: Frequent position changes     Problem: Neurosensory - Adult  Goal: Achieves stable or improved neurological status  Outcome: Progressing  Flowsheets (Taken 10/26/2023 1707)  Achieves stable or improved neurological status:   Assess for and report changes in neurological status   Initiate measures to prevent increased intracranial pressure   Maintain blood pressure and fluid volume within ordered

## 2023-10-27 LAB
ANION GAP SERPL CALC-SCNC: 11 MEQ/L (ref 8–16)
BUN SERPL-MCNC: 19 MG/DL (ref 7–22)
CA-I BLD ISE-SCNC: 1.2 MMOL/L (ref 1.12–1.32)
CALCIUM SERPL-MCNC: 8.4 MG/DL (ref 8.5–10.5)
CHLORIDE SERPL-SCNC: 110 MEQ/L (ref 98–111)
CO2 SERPL-SCNC: 23 MEQ/L (ref 23–33)
CREAT SERPL-MCNC: 0.6 MG/DL (ref 0.4–1.2)
DEPRECATED RDW RBC AUTO: 52.6 FL (ref 35–45)
ERYTHROCYTE [DISTWIDTH] IN BLOOD BY AUTOMATED COUNT: 15.7 % (ref 11.5–14.5)
GFR SERPL CREATININE-BSD FRML MDRD: > 60 ML/MIN/1.73M2
GLUCOSE BLD STRIP.AUTO-MCNC: 142 MG/DL (ref 70–108)
GLUCOSE BLD STRIP.AUTO-MCNC: 169 MG/DL (ref 70–108)
GLUCOSE BLD STRIP.AUTO-MCNC: 172 MG/DL (ref 70–108)
GLUCOSE BLD STRIP.AUTO-MCNC: 198 MG/DL (ref 70–108)
GLUCOSE BLD STRIP.AUTO-MCNC: 215 MG/DL (ref 70–108)
GLUCOSE SERPL-MCNC: 195 MG/DL (ref 70–108)
HCT VFR BLD AUTO: 26 % (ref 37–47)
HGB BLD-MCNC: 8.2 GM/DL (ref 12–16)
MCH RBC QN AUTO: 29.7 PG (ref 26–33)
MCHC RBC AUTO-ENTMCNC: 31.5 GM/DL (ref 32.2–35.5)
MCV RBC AUTO: 94.2 FL (ref 81–99)
PLATELET # BLD AUTO: 222 THOU/MM3 (ref 130–400)
PMV BLD AUTO: 10.6 FL (ref 9.4–12.4)
POTASSIUM SERPL-SCNC: 3.5 MEQ/L (ref 3.5–5.2)
RBC # BLD AUTO: 2.76 MILL/MM3 (ref 4.2–5.4)
SODIUM SERPL-SCNC: 144 MEQ/L (ref 135–145)
WBC # BLD AUTO: 9.3 THOU/MM3 (ref 4.8–10.8)

## 2023-10-27 PROCEDURE — 6370000000 HC RX 637 (ALT 250 FOR IP): Performed by: INTERNAL MEDICINE

## 2023-10-27 PROCEDURE — 2500000003 HC RX 250 WO HCPCS: Performed by: INTERNAL MEDICINE

## 2023-10-27 PROCEDURE — 6360000002 HC RX W HCPCS: Performed by: PHYSICIAN ASSISTANT

## 2023-10-27 PROCEDURE — 6360000002 HC RX W HCPCS: Performed by: EMERGENCY MEDICINE

## 2023-10-27 PROCEDURE — 2580000003 HC RX 258: Performed by: EMERGENCY MEDICINE

## 2023-10-27 PROCEDURE — 37799 UNLISTED PX VASCULAR SURGERY: CPT

## 2023-10-27 PROCEDURE — 6370000000 HC RX 637 (ALT 250 FOR IP): Performed by: NURSE PRACTITIONER

## 2023-10-27 PROCEDURE — 99024 POSTOP FOLLOW-UP VISIT: CPT | Performed by: REGISTERED NURSE

## 2023-10-27 PROCEDURE — 6370000000 HC RX 637 (ALT 250 FOR IP)

## 2023-10-27 PROCEDURE — 94761 N-INVAS EAR/PLS OXIMETRY MLT: CPT

## 2023-10-27 PROCEDURE — 94003 VENT MGMT INPAT SUBQ DAY: CPT

## 2023-10-27 PROCEDURE — 2700000000 HC OXYGEN THERAPY PER DAY

## 2023-10-27 PROCEDURE — 6370000000 HC RX 637 (ALT 250 FOR IP): Performed by: EMERGENCY MEDICINE

## 2023-10-27 PROCEDURE — 36593 DECLOT VASCULAR DEVICE: CPT

## 2023-10-27 PROCEDURE — 82330 ASSAY OF CALCIUM: CPT

## 2023-10-27 PROCEDURE — 99232 SBSQ HOSP IP/OBS MODERATE 35: CPT | Performed by: INTERNAL MEDICINE

## 2023-10-27 PROCEDURE — 2580000003 HC RX 258: Performed by: NURSE PRACTITIONER

## 2023-10-27 PROCEDURE — 6360000002 HC RX W HCPCS

## 2023-10-27 PROCEDURE — 82948 REAGENT STRIP/BLOOD GLUCOSE: CPT

## 2023-10-27 PROCEDURE — 2500000003 HC RX 250 WO HCPCS

## 2023-10-27 PROCEDURE — 80048 BASIC METABOLIC PNL TOTAL CA: CPT

## 2023-10-27 PROCEDURE — 85027 COMPLETE CBC AUTOMATED: CPT

## 2023-10-27 PROCEDURE — 2000000000 HC ICU R&B

## 2023-10-27 PROCEDURE — 36415 COLL VENOUS BLD VENIPUNCTURE: CPT

## 2023-10-27 RX ORDER — INSULIN GLARGINE 100 [IU]/ML
14 INJECTION, SOLUTION SUBCUTANEOUS NIGHTLY
Status: DISCONTINUED | OUTPATIENT
Start: 2023-10-27 | End: 2023-10-28

## 2023-10-27 RX ORDER — INSULIN GLARGINE 100 [IU]/ML
12 INJECTION, SOLUTION SUBCUTANEOUS NIGHTLY
Status: DISCONTINUED | OUTPATIENT
Start: 2023-10-27 | End: 2023-10-27

## 2023-10-27 RX ADMIN — PHENYTOIN SODIUM 50 MG: 50 INJECTION INTRAMUSCULAR; INTRAVENOUS at 08:42

## 2023-10-27 RX ADMIN — ALTEPLASE 1 MG: 2.2 INJECTION, POWDER, LYOPHILIZED, FOR SOLUTION INTRAVENOUS at 15:03

## 2023-10-27 RX ADMIN — MICONAZOLE NITRATE: 2 POWDER TOPICAL at 19:54

## 2023-10-27 RX ADMIN — FAMOTIDINE 20 MG: 20 TABLET ORAL at 19:58

## 2023-10-27 RX ADMIN — ALTEPLASE 1 MG: 2.2 INJECTION, POWDER, LYOPHILIZED, FOR SOLUTION INTRAVENOUS at 15:01

## 2023-10-27 RX ADMIN — SODIUM CHLORIDE: 9 INJECTION, SOLUTION INTRAVENOUS at 02:02

## 2023-10-27 RX ADMIN — PHENYTOIN SODIUM 150 MG: 50 INJECTION INTRAMUSCULAR; INTRAVENOUS at 22:02

## 2023-10-27 RX ADMIN — INSULIN LISPRO 4 UNITS: 100 INJECTION, SOLUTION INTRAVENOUS; SUBCUTANEOUS at 11:09

## 2023-10-27 RX ADMIN — LEVETIRACETAM 1000 MG: 100 INJECTION, SOLUTION INTRAVENOUS at 06:34

## 2023-10-27 RX ADMIN — MODAFINIL 200 MG: 100 TABLET ORAL at 08:41

## 2023-10-27 RX ADMIN — CHLORHEXIDINE GLUCONATE 0.12% ORAL RINSE 15 ML: 1.2 LIQUID ORAL at 08:41

## 2023-10-27 RX ADMIN — PHENYTOIN SODIUM 150 MG: 50 INJECTION INTRAMUSCULAR; INTRAVENOUS at 08:41

## 2023-10-27 RX ADMIN — BUMETANIDE 0.5 MG: 0.25 INJECTION INTRAMUSCULAR; INTRAVENOUS at 17:19

## 2023-10-27 RX ADMIN — INSULIN GLARGINE 14 UNITS: 100 INJECTION, SOLUTION SUBCUTANEOUS at 20:08

## 2023-10-27 RX ADMIN — LEVETIRACETAM 1000 MG: 100 INJECTION, SOLUTION INTRAVENOUS at 18:20

## 2023-10-27 RX ADMIN — FAMOTIDINE 20 MG: 20 TABLET ORAL at 08:41

## 2023-10-27 RX ADMIN — MICONAZOLE NITRATE: 2 POWDER TOPICAL at 15:37

## 2023-10-27 RX ADMIN — CHLORHEXIDINE GLUCONATE 0.12% ORAL RINSE 15 ML: 1.2 LIQUID ORAL at 19:54

## 2023-10-27 RX ADMIN — SODIUM CHLORIDE, PRESERVATIVE FREE 10 ML: 5 INJECTION INTRAVENOUS at 20:04

## 2023-10-27 ASSESSMENT — PULMONARY FUNCTION TESTS
PIF_VALUE: 14
PIF_VALUE: 15
PIF_VALUE: 14
PIF_VALUE: 14

## 2023-10-27 NOTE — PLAN OF CARE
Problem: Chronic Conditions and Co-morbidities  Goal: Patient's chronic conditions and co-morbidity symptoms are monitored and maintained or improved  Outcome: Progressing  Flowsheets (Taken 10/27/2023 0800)  Care Plan - Patient's Chronic Conditions and Co-Morbidity Symptoms are Monitored and Maintained or Improved: Monitor and assess patient's chronic conditions and comorbid symptoms for stability, deterioration, or improvement     Problem: Safety - Adult  Goal: Free from fall injury  Outcome: Progressing     Problem: Pain  Goal: Verbalizes/displays adequate comfort level or baseline comfort level  Outcome: Progressing     Problem: Respiratory - Adult  Goal: Normal spontaneous ventilation  10/27/2023 0451 by Maria Victoria Ayon RCP  Outcome: Progressing  Goal: Achieves optimal ventilation and oxygenation  10/27/2023 1735 by Andressa Lim RN  Outcome: Progressing  Flowsheets (Taken 10/27/2023 0800)  Achieves optimal ventilation and oxygenation:   Assess for changes in respiratory status   Assess for changes in mentation and behavior   Position to facilitate oxygenation and minimize respiratory effort  10/27/2023 0451 by Maria Victoria Ayon RCP  Outcome: Progressing  Flowsheets (Taken 10/26/2023 1707 by Tony Beasley RN)  Achieves optimal ventilation and oxygenation:   Assess for changes in respiratory status   Assess for changes in mentation and behavior   Position to facilitate oxygenation and minimize respiratory effort   Oxygen supplementation based on oxygen saturation or arterial blood gases   Initiate smoking cessation protocol as indicated   Encourage broncho-pulmonary hygiene including cough, deep breathe, incentive spirometry   Assess the need for suctioning and aspirate as needed     Problem: Neurosensory - Adult  Goal: Absence of seizures  Outcome: Progressing  Flowsheets (Taken 10/27/2023 0800)  Absence of seizures:   Monitor for seizure activity.   If seizure occurs, document type and location of

## 2023-10-27 NOTE — PLAN OF CARE
Problem: Respiratory - Adult  Goal: Normal spontaneous ventilation  Outcome: Progressing     Problem: Respiratory - Adult  Goal: Achieves optimal ventilation and oxygenation  10/27/2023 0451 by Marlo Houston RCP  Outcome: Progressing                                               Patient Weaning Progress    The patient's vent settings was able to be weaned this shift. Ventilator settings that were weaned              [x] Mode   [x] Pressure support weaned   [] Fio2 weaned   [] Peep weaned      Spontaneous weaning trial  was attempted. The patient was able to tolerate SBT. RSBI  was 31.47 with EtCO2 of 31 and SpO2 of 98 on 21% FiO2. Spontanteous VT was 572 and RR 18 breaths/min. Evac tube was not  hooked up with continuous low suction(20-30mmHg)      Cuff was not  deflated to determine cuff leak. Unable to get agreement for goals because no family is present and patient cannot respond.

## 2023-10-27 NOTE — PROGRESS NOTES
Comprehensive Nutrition Assessment    Type and Reason for Visit:  Reassess (Tube Feed Monitor)    Nutrition Recommendations/Plan:   Continue current tubefeeding regimen: Vital 1.2 bolus 215 ml every 4 hours to better meet nutritional needs. Free water flush 30 ml before and after each bolus (or per provider). Consider starting Culturelle d/t diarrhea. Malnutrition Assessment:  Malnutrition Status:  Insufficient data (10/13/23 1128)    Context:  Chronic Illness     Findings of the 6 clinical characteristics of malnutrition:  Energy Intake:  Unable to assess (pt. intubated)  Weight Loss:  Greater than 10% over 6 months (31# or 16% in 5 months)     Body Fat Loss:  Unable to assess (facial edema; RN asked not to disturb pt. this am)     Muscle Mass Loss:  Unable to assess    Fluid Accumulation:  Mild     Strength:  Not Performed    Nutrition Assessment: Pt improving from a nutritional standpoint AEB pt NPO, intubated and is tolerating Vital 1.2 bolus tube feed at goal. 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 Nutrition Related Findings:    Pt. Report/Treatments/Miscellaneous: Pt seen-intubated; RN present- she reports that patient is tolerating bolus TF at goal and started having on diarrhea on 10/26. GI Status: last BM x2 on 10/26. Pt having diarrhea since 10/26 per RN report- started on Onyx Vasu on 10/26. Pertinent Labs: 10/27/23: Na 144, K+ 3.5, BUN 19, Cr. 0.6, Glucose 195, POC Glucose 215.   Pertinent Meds: Bumex, Pepcid, Lantus, Humalog, Dilantin, Fibercon  Wound Type: Pressure Injury, Stage II (coccyx; stage 1 buttocks; skin tear abdomen; 10/12 ENT surgery: Cricotracheal Resection, Laryngoplasty with Flap and Vocal Fold Lateralization Stitch Placement)       Current Nutrition Intake & Therapies:    Average Meal Intake: NPO  Average Supplements Intake: NPO  Diet NPO Exceptions are: Sips of Water with Meds  ADULT TUBE FEEDING; Nasogastric; Peptide Based; Bolus; 6 Times Daily; 215; Gravity; 30; Before and after each bolus  Current Tube Feeding (TF) Orders:  Feeding Route: Nasogastric (with bridle device)  Formula: Peptide Based (started 10/13)  Schedule: Bolus  Feeding Regimen: Vital 1.2 bolus, 215 mls every 4 hours  Additives/Modulars: None  Water Flushes: 30ml before and after each bolus  Current TF & Flush Orders Provides: Vital 1.2 bolus 215 ml every 4 hours = 1548 kcals, 97 gm protein, 143 gm CHO, 7 gm fiber, 1406 ml free water (1046 TF, 360 flushes) in 1650 ml total volume (1290 TF, 360 flushes)/24 hours  Goal TF & Flush Orders Provides: Vital 1.2 bolus 215 ml every 4 hours = 1548 kcals, 97 gm protein, 143 gm CHO, 7 gm fiber, 1406 ml free water (1046 TF, 360 flushes) in 1650 ml total volume (1290 TF, 360 flushes)/24 hours    Anthropometric Measures:  Height: 157.5 cm (5' 2\")  Ideal Body Weight (IBW): 110 lbs (50 kg)    Admission Body Weight: 76.2 kg (168 lb) (10/12 with facial edema)  Current Body Weight: 62.5 kg (137 lb 12.6 oz) (10/25; +2-3 pitting edema in extremities)  Weight Source: Bed Scale  Current BMI (kg/m2): 25.2  Usual Body Weight: 90.3 kg (199 lb) (5/11/23; 206# 2/28/23)  % Weight Change (Calculated): -15.6  BMI Categories: Overweight (BMI 25.0-29. 9)    Estimated Daily Nutrient Needs:  Energy Requirements Based On: Kcal/kg  Weight Used for Energy Requirements: Current (62.5 kg)  Energy (kcal/day): 8200-1771 kcal/day (20-25 kcal/kg)  Weight Used for Protein Requirements: Ideal (50kgm)  Protein (g/day):  g/day (1.4-2 g/kg)  Method Used for Fluid Requirements: Other (Comment)  Fluid (ml/day): per Physician    Nutrition Diagnosis:   Inadequate oral intake related to impaired respiratory function as evidenced

## 2023-10-27 NOTE — PLAN OF CARE
Problem: Respiratory - Adult  Goal: Normal spontaneous ventilation  10/27/2023 1835 by Ortiz Chang RCP  Outcome: Progressing                                                  Patient Weaning Progress    The patient's vent settings was not able to be weaned this shift. Spontaneous weaning trial  was attempted. The patient was able to tolerate SBT. The patient was on SBT for 12 hours+. Cuff was not  deflated to determine cuff leak. Unable to get agreement for goals because no family is present and patient cannot respond.

## 2023-10-27 NOTE — PROGRESS NOTES
Cathflo instilled into gray and red lumen of line at 1504. Primary RN notified. Line capped and labeled. Do not flush or draw off line. Attending attestation: I have read and agree with this Discharge Note. This is a [id number], admitted with status asthmaticus, RE virus infection    I was physically present for the evaluation and management services provided. I agree with the included history, physical, and plan which I reviewed and edited where appropriate. I spent 35 minutes with the patient and the patient's family on direct patient care and discharge planning with more than 50% of the visit spent on counseling and/or coordination of care.     Gen: no apparent distress, appears comfortable  HEENT: normocephalic/atraumatic, moist mucous membranes, extraocular movements intact, clear conjunctiva  Neck: supple  Heart: S1S2+, regular rate and rhythm, no murmur, cap refill < 2 sec, 2+ peripheral pulses  Lungs: normal respiratory pattern, clear to auscultation bilaterally, no wheezes, good air movement, no accessory muscle use  Abd: soft, nontender, nondistended  Ext: full range of motion, no edema, no tenderness  Neuro: no focal deficits, awake, alert, no acute change from baseline exam  Skin: no rash, intact and not indurated    First time wheeze admitted with status asthmaticus.  Improved quickly with asthma directed therapies.  No prior hx, no nighttime cough, no activity restriction. Will DC home with orapred to complete 5 days and albuterol as needed.  PMD f/u in 1-2 days.  Flu shot before discharge.      Kevin Corley MD  Pediatric Hospitalist

## 2023-10-27 NOTE — CARE COORDINATION
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 There is also nonocclusive thrombus within the left posterior tibial vein  10/16 Extubated to bipap  10/16 Code Blue  10/16 Reintubated  10/16 Bronch: thick bloody secretions in RLL removed  10/16 CXR: Mild stable cardiomegaly with pulmonary vascular congestion suspicious for congestive heart failure; Prominent pulmonary interstitium suspicious for pulmonary edema  10/17 5 hr EEG: No seizures noted; diffuse background attenuation and suppression without evidence of reactivity; severe encephalopathy  10/17 CT Head: Stable CT appearance the brain. No acute findings  10/17 MRI Brain: No acute findings  86/50 PICC left basilic  13/10 CT Head: No acute findings  10/18 OR: ET Exchange bronhoscopy and lavage  10/19 4hr 40 min EEG: This EEG was abnormal. The background abnormalities indicated a moderate to severe encephalopathy, nonspecific to etiology. The generalized periodic discharges (GPDs) indicated underlying cortical irritability/increased risk of seizures, and can be seen in various encephalopathies. No seizures were recorded  10/19 MRI Brain: No evidence of an acute infarct. No evidence of anoxia; Stable mild severity chronic small vessel ischemic changes; Global volume loss  10/23 4-day EEG: Moderate to severe encephalopathy, improved as recording progressed.  Frequent and at times near continuous runs of GPDs with often typical and at times atypical

## 2023-10-27 NOTE — PROGRESS NOTES
CRITICAL CARE PROGRESS NOTE      Patient:  Jason Moreno    Unit/Bed:4D-08/008-A  YOB: 1950  MRN: [id number]   PCP: Erasto Downey MD  Date of Admission: 10/12/2023  Chief Complaint:- tracheal stenosis s/p resection    Assessment and Plan:    Anoxic Brain Injury:  2/2 respiratory arrest and subsequent cardiac arrest following failed extubation 10/16   EEG 10/17: diffuse background attenuation and suppression w/out evidence of reactivity. No seizures. CT Head 10/17: no acute findings   MRI Brain 10/17: no acute findings   MRI Brain 10/19: No evidence of anoxia, no evidence of acute infarct   EEG 10/19: No seizures, moderate to severe encephalopathy   Continuous EEG starting 10/20   No seizures, improved compared to EEG on 10/19 and significantly improved from 10/17  3% @ 15mL/hr with goal Na 145-150 for concerns of cerebral edema, HELD 10/20   Neuro following, started Provigil 200mg   Seizure prophylaxis with Keppra and Dilantin added by neurology   On zosyn and vancomycin for surgical site infection prophylaxis   Zosyn and Vancomycin D/C 10/24   S/p cricotracheal resection, laryngoplasty with flap and vocal fold lateralization stitch placement: with Dr. Deandra Slaughter   POD 15  HOB at 30 degrees with pillow under head for semi-flexed position   Monitor RACHEL drains, 22mL output last 24 hrs   10/18: 5.0 ET tube exchanged for 7.0 ET tube by Dr. Deandra Slaughter   Bilateral DVTs, superficial:    Venous Duplex Lower Extremity 10/12: thrombus in BL posterior tibial veins and right peroneal vein   Patient off of heparin and started on Lovenox   Lovenox held by ENT due to blood around surgical site 10/20  Lovenox resumed 10/23  Venous Duplex Upper Extremities 10/23: Left axillary, brachial, basilic and cephalic vein thrombus.  Right cephalic and antecubital thrombosis   DM Type 2:  01/2023 HbA1c: 5.5   Patient on 10 units Lantus nightly   High dose SSI   Chronic diastolic CHF:  History   ECHO 1/2/23: EF recovery. 10/25: No more watery bowel movements. Rectal tube removed. Patient flexing her lower extremities to pain. Flexed her LUE to pain today. Afebrile overnight. WBC down from yesterday. Continue to monitor for neurological recovery. 10/26: No acute events overnight. Patients Hb dropped to 6.7 from 7.4 overnight. Patients  called for consent to give blood. Consent obtained and 1 unit PRBC ordered. Patients lovenox held. No signs of active bleeding. 10/27: No acute events overnight. Patient received 1 unit PRBC yesterday and repeat hemoglobin has stabilized. Continue to monitor for neurological recovery. Past Medical History:  Obtained from chart review due to patient being intubated. Arthritis, CAD, diabetic neuropathy, DVT, hyperlipidemia, hypertension, type 2 diabetes. Family History: Father: Parkinson's disease, lung cancer. Social History: Denies any smoking history, no recent alcohol use. ROS   Unable to be obtained due to patient being intubated. Scheduled Meds:   insulin glargine  10 Units SubCUTAneous Nightly    insulin lispro  0-16 Units SubCUTAneous Q6H    [Held by provider] enoxaparin  70 mg SubCUTAneous Q12H    phenytoin  150 mg IntraVENous Q12H    phenytoin  50 mg IntraVENous Daily    [Held by provider] carvedilol  6.25 mg Orogastric BID WC    [Held by provider] amLODIPine  5 mg Orogastric Daily    bumetanide  0.5 mg IntraVENous Q24H    famotidine  20 mg Oral BID    modafinil  200 mg Oral Daily    scopolamine  1 patch TransDERmal Q72H    levETIRAcetam  1,000 mg IntraVENous Q12H    sodium chloride flush  5-40 mL IntraVENous 2 times per day    chlorhexidine  15 mL Mouth/Throat BID     Continuous Infusions:   sodium chloride      norepinephrine Stopped (10/21/23 0131)    sodium chloride 10 mL/hr at 10/27/23 0202    dextrose         PHYSICAL EXAMINATION:  T:  97.9.  P:  83. RR:  17. B/P:  137/66.   FiO2:  21. O2 Sat:  97.  I/O:  + 1,680, -947  Body mass index is 25.2

## 2023-10-27 NOTE — PROGRESS NOTES
Cathflo removed from red and gray lumen of PICC line. 10 ml of blood drawn from port and discarded. Line flushed with 10 ml of normal saline. Posiflow adapter changed. Primary RN notified.

## 2023-10-28 ENCOUNTER — APPOINTMENT (OUTPATIENT)
Dept: GENERAL RADIOLOGY | Age: 73
DRG: 163 | End: 2023-10-28
Attending: OTOLARYNGOLOGY
Payer: MEDICARE

## 2023-10-28 LAB
ANION GAP SERPL CALC-SCNC: 10 MEQ/L (ref 8–16)
BUN SERPL-MCNC: 20 MG/DL (ref 7–22)
CALCIUM SERPL-MCNC: 8.4 MG/DL (ref 8.5–10.5)
CHLORIDE SERPL-SCNC: 106 MEQ/L (ref 98–111)
CO2 SERPL-SCNC: 26 MEQ/L (ref 23–33)
CREAT SERPL-MCNC: 0.6 MG/DL (ref 0.4–1.2)
DEPRECATED RDW RBC AUTO: 53.8 FL (ref 35–45)
ERYTHROCYTE [DISTWIDTH] IN BLOOD BY AUTOMATED COUNT: 15.7 % (ref 11.5–14.5)
GFR SERPL CREATININE-BSD FRML MDRD: > 60 ML/MIN/1.73M2
GLUCOSE BLD STRIP.AUTO-MCNC: 170 MG/DL (ref 70–108)
GLUCOSE BLD STRIP.AUTO-MCNC: 183 MG/DL (ref 70–108)
GLUCOSE BLD STRIP.AUTO-MCNC: 185 MG/DL (ref 70–108)
GLUCOSE BLD STRIP.AUTO-MCNC: 193 MG/DL (ref 70–108)
GLUCOSE BLD STRIP.AUTO-MCNC: 227 MG/DL (ref 70–108)
GLUCOSE SERPL-MCNC: 180 MG/DL (ref 70–108)
HCT VFR BLD AUTO: 28.2 % (ref 37–47)
HGB BLD-MCNC: 8.8 GM/DL (ref 12–16)
MCH RBC QN AUTO: 30 PG (ref 26–33)
MCHC RBC AUTO-ENTMCNC: 31.2 GM/DL (ref 32.2–35.5)
MCV RBC AUTO: 96.2 FL (ref 81–99)
PLATELET # BLD AUTO: 243 THOU/MM3 (ref 130–400)
PMV BLD AUTO: 10.6 FL (ref 9.4–12.4)
POTASSIUM SERPL-SCNC: 3.8 MEQ/L (ref 3.5–5.2)
RBC # BLD AUTO: 2.93 MILL/MM3 (ref 4.2–5.4)
SODIUM SERPL-SCNC: 142 MEQ/L (ref 135–145)
WBC # BLD AUTO: 7.9 THOU/MM3 (ref 4.8–10.8)

## 2023-10-28 PROCEDURE — 94003 VENT MGMT INPAT SUBQ DAY: CPT

## 2023-10-28 PROCEDURE — 6370000000 HC RX 637 (ALT 250 FOR IP): Performed by: NURSE PRACTITIONER

## 2023-10-28 PROCEDURE — 2580000003 HC RX 258: Performed by: NURSE PRACTITIONER

## 2023-10-28 PROCEDURE — 6360000002 HC RX W HCPCS: Performed by: PHYSICIAN ASSISTANT

## 2023-10-28 PROCEDURE — 85027 COMPLETE CBC AUTOMATED: CPT

## 2023-10-28 PROCEDURE — 6360000002 HC RX W HCPCS: Performed by: EMERGENCY MEDICINE

## 2023-10-28 PROCEDURE — 6370000000 HC RX 637 (ALT 250 FOR IP)

## 2023-10-28 PROCEDURE — APPNB45 APP NON BILLABLE 31-45 MINUTES: Performed by: NURSE PRACTITIONER

## 2023-10-28 PROCEDURE — 82948 REAGENT STRIP/BLOOD GLUCOSE: CPT

## 2023-10-28 PROCEDURE — 2700000000 HC OXYGEN THERAPY PER DAY

## 2023-10-28 PROCEDURE — 2580000003 HC RX 258: Performed by: EMERGENCY MEDICINE

## 2023-10-28 PROCEDURE — 71045 X-RAY EXAM CHEST 1 VIEW: CPT

## 2023-10-28 PROCEDURE — 36415 COLL VENOUS BLD VENIPUNCTURE: CPT

## 2023-10-28 PROCEDURE — 6360000002 HC RX W HCPCS: Performed by: INTERNAL MEDICINE

## 2023-10-28 PROCEDURE — 6370000000 HC RX 637 (ALT 250 FOR IP): Performed by: INTERNAL MEDICINE

## 2023-10-28 PROCEDURE — 2000000000 HC ICU R&B

## 2023-10-28 PROCEDURE — 6360000002 HC RX W HCPCS

## 2023-10-28 PROCEDURE — 2500000003 HC RX 250 WO HCPCS

## 2023-10-28 PROCEDURE — 80048 BASIC METABOLIC PNL TOTAL CA: CPT

## 2023-10-28 PROCEDURE — 6370000000 HC RX 637 (ALT 250 FOR IP): Performed by: EMERGENCY MEDICINE

## 2023-10-28 PROCEDURE — 94761 N-INVAS EAR/PLS OXIMETRY MLT: CPT

## 2023-10-28 PROCEDURE — 99232 SBSQ HOSP IP/OBS MODERATE 35: CPT | Performed by: INTERNAL MEDICINE

## 2023-10-28 PROCEDURE — 2500000003 HC RX 250 WO HCPCS: Performed by: INTERNAL MEDICINE

## 2023-10-28 RX ORDER — FENTANYL CITRATE 50 UG/ML
100 INJECTION, SOLUTION INTRAMUSCULAR; INTRAVENOUS ONCE
Status: COMPLETED | OUTPATIENT
Start: 2023-10-28 | End: 2023-10-28

## 2023-10-28 RX ORDER — INSULIN GLARGINE 100 [IU]/ML
15 INJECTION, SOLUTION SUBCUTANEOUS NIGHTLY
Status: DISCONTINUED | OUTPATIENT
Start: 2023-10-28 | End: 2023-10-28

## 2023-10-28 RX ORDER — FENTANYL CITRATE 50 UG/ML
INJECTION, SOLUTION INTRAMUSCULAR; INTRAVENOUS
Status: COMPLETED
Start: 2023-10-28 | End: 2023-10-28

## 2023-10-28 RX ORDER — ENOXAPARIN SODIUM 100 MG/ML
70 INJECTION SUBCUTANEOUS ONCE
Status: COMPLETED | OUTPATIENT
Start: 2023-10-28 | End: 2023-10-28

## 2023-10-28 RX ORDER — INSULIN GLARGINE 100 [IU]/ML
17 INJECTION, SOLUTION SUBCUTANEOUS NIGHTLY
Status: DISCONTINUED | OUTPATIENT
Start: 2023-10-28 | End: 2023-10-30

## 2023-10-28 RX ADMIN — CHLORHEXIDINE GLUCONATE 0.12% ORAL RINSE 15 ML: 1.2 LIQUID ORAL at 19:44

## 2023-10-28 RX ADMIN — FENTANYL CITRATE 100 MCG: 50 INJECTION, SOLUTION INTRAMUSCULAR; INTRAVENOUS at 03:00

## 2023-10-28 RX ADMIN — MODAFINIL 200 MG: 100 TABLET ORAL at 07:37

## 2023-10-28 RX ADMIN — MICONAZOLE NITRATE: 2 POWDER TOPICAL at 20:11

## 2023-10-28 RX ADMIN — PHENYTOIN SODIUM 150 MG: 50 INJECTION INTRAMUSCULAR; INTRAVENOUS at 07:37

## 2023-10-28 RX ADMIN — SODIUM CHLORIDE, PRESERVATIVE FREE 10 ML: 5 INJECTION INTRAVENOUS at 19:44

## 2023-10-28 RX ADMIN — PHENYTOIN SODIUM 50 MG: 50 INJECTION INTRAMUSCULAR; INTRAVENOUS at 07:39

## 2023-10-28 RX ADMIN — ENOXAPARIN SODIUM 70 MG: 100 INJECTION SUBCUTANEOUS at 09:41

## 2023-10-28 RX ADMIN — LEVETIRACETAM 1000 MG: 100 INJECTION, SOLUTION INTRAVENOUS at 17:40

## 2023-10-28 RX ADMIN — INSULIN GLARGINE 17 UNITS: 100 INJECTION, SOLUTION SUBCUTANEOUS at 19:49

## 2023-10-28 RX ADMIN — PHENYTOIN SODIUM 150 MG: 50 INJECTION INTRAMUSCULAR; INTRAVENOUS at 19:51

## 2023-10-28 RX ADMIN — FAMOTIDINE 20 MG: 20 TABLET ORAL at 07:37

## 2023-10-28 RX ADMIN — METOPROLOL TARTRATE 5 MG: 5 INJECTION INTRAVENOUS at 05:12

## 2023-10-28 RX ADMIN — FAMOTIDINE 20 MG: 20 TABLET ORAL at 19:46

## 2023-10-28 RX ADMIN — BUMETANIDE 0.5 MG: 0.25 INJECTION INTRAMUSCULAR; INTRAVENOUS at 16:48

## 2023-10-28 RX ADMIN — MICONAZOLE NITRATE: 2 POWDER TOPICAL at 07:39

## 2023-10-28 RX ADMIN — ENOXAPARIN SODIUM 70 MG: 100 INJECTION SUBCUTANEOUS at 19:46

## 2023-10-28 RX ADMIN — CHLORHEXIDINE GLUCONATE 0.12% ORAL RINSE 15 ML: 1.2 LIQUID ORAL at 07:37

## 2023-10-28 RX ADMIN — LEVETIRACETAM 1000 MG: 100 INJECTION, SOLUTION INTRAVENOUS at 05:34

## 2023-10-28 RX ADMIN — INSULIN LISPRO 4 UNITS: 100 INJECTION, SOLUTION INTRAVENOUS; SUBCUTANEOUS at 06:36

## 2023-10-28 ASSESSMENT — PULMONARY FUNCTION TESTS
PIF_VALUE: 14
PIF_VALUE: 15

## 2023-10-28 NOTE — PLAN OF CARE
Problem: Discharge Planning  Goal: Discharge to home or other facility with appropriate resources  10/28/2023 1003 by Rogelio Edouard RN  Outcome: Not Progressing  Flowsheets (Taken 10/28/2023 0800)  Discharge to home or other facility with appropriate resources: Identify barriers to discharge with patient and caregiver  10/27/2023 2149 by Marita Jackson RN  Outcome: Progressing  Flowsheets  Taken 10/27/2023 1915 by Marita Jackson RN  Discharge to home or other facility with appropriate resources:   Identify barriers to discharge with patient and caregiver   Arrange for needed discharge resources and transportation as appropriate  Taken 10/27/2023 0800 by Rogelio Edouard RN  Discharge to home or other facility with appropriate resources: Identify barriers to discharge with patient and caregiver     Problem: Neurosensory - Adult  Goal: Achieves stable or improved neurological status  10/28/2023 1003 by Rogelio Edouard RN  Outcome: Not Progressing  Flowsheets (Taken 10/28/2023 0800)  Achieves stable or improved neurological status:   Assess for and report changes in neurological status   Initiate measures to prevent increased intracranial pressure  10/27/2023 2149 by Marita Jackson RN  Outcome: Progressing  Flowsheets  Taken 10/27/2023 1915 by Marita Jackson RN  Achieves stable or improved neurological status:   Assess for and report changes in neurological status   Initiate measures to prevent increased intracranial pressure  Taken 10/27/2023 0800 by Rogelio Edouard RN  Achieves stable or improved neurological status:   Initiate measures to prevent increased intracranial pressure   Assess for and report changes in neurological status     Problem: Discharge Planning  Goal: Discharge to home or other facility with appropriate resources  10/28/2023 1003 by Rogelio Edouard RN  Outcome: Not Progressing  Flowsheets (Taken 10/28/2023 0800)  Discharge to home or other facility with appropriate resources:

## 2023-10-28 NOTE — PLAN OF CARE
Problem: Discharge Planning  Goal: Discharge to home or other facility with appropriate resources  Outcome: Progressing  Flowsheets  Taken 10/27/2023 1915 by Chace Richards RN  Discharge to home or other facility with appropriate resources:   Identify barriers to discharge with patient and caregiver   Arrange for needed discharge resources and transportation as appropriate  Taken 10/27/2023 0800 by Andressa Lim RN  Discharge to home or other facility with appropriate resources: Identify barriers to discharge with patient and caregiver     Problem: Chronic Conditions and Co-morbidities  Goal: Patient's chronic conditions and co-morbidity symptoms are monitored and maintained or improved  10/27/2023 2149 by Chace Richards RN  Outcome: Progressing  Flowsheets (Taken 10/27/2023 1915)  Care Plan - Patient's Chronic Conditions and Co-Morbidity Symptoms are Monitored and Maintained or Improved:   Monitor and assess patient's chronic conditions and comorbid symptoms for stability, deterioration, or improvement   Collaborate with multidisciplinary team to address chronic and comorbid conditions and prevent exacerbation or deterioration  10/27/2023 1735 by Andressa Lim RN  Outcome: Progressing  Flowsheets (Taken 10/27/2023 0800)  Care Plan - Patient's Chronic Conditions and Co-Morbidity Symptoms are Monitored and Maintained or Improved: Monitor and assess patient's chronic conditions and comorbid symptoms for stability, deterioration, or improvement     Problem: Safety - Adult  Goal: Free from fall injury  10/27/2023 2149 by Chace Richards RN  Outcome: Progressing  Flowsheets (Taken 10/26/2023 1707 by Tony Beasley RN)  Free From Fall Injury:   Instruct family/caregiver on patient safety   Based on caregiver fall risk screen, instruct family/caregiver to ask for assistance with transferring infant if caregiver noted to have fall risk factors  10/27/2023 1735 by Andressa Lim RN  Outcome: Progressing Problem: Pain  Goal: Verbalizes/displays adequate comfort level or baseline comfort level  10/27/2023 2149 by Deidre Kennedy RN  Outcome: Progressing  Flowsheets (Taken 10/27/2023 1915)  Verbalizes/displays adequate comfort level or baseline comfort level:   Encourage patient to monitor pain and request assistance   Assess pain using appropriate pain scale  10/27/2023 1735 by Julio César Zhou RN  Outcome: Progressing     Problem: Respiratory - Adult  Goal: Normal spontaneous ventilation  10/27/2023 1835 by Edward Sahu RCP  Outcome: Progressing  Goal: Achieves optimal ventilation and oxygenation  10/27/2023 2149 by Deidre Kennedy RN  Outcome: Progressing  Flowsheets (Taken 10/27/2023 1915)  Achieves optimal ventilation and oxygenation:   Assess for changes in respiratory status   Assess for changes in mentation and behavior  10/27/2023 1735 by Julio César Zhou RN  Outcome: Progressing  Flowsheets (Taken 10/27/2023 0800)  Achieves optimal ventilation and oxygenation:   Assess for changes in respiratory status   Assess for changes in mentation and behavior   Position to facilitate oxygenation and minimize respiratory effort     Problem: Nutrition Deficit:  Goal: Optimize nutritional status  10/27/2023 2149 by Deidre Kennedy RN  Outcome: Progressing  Flowsheets (Taken 10/27/2023 1212 by Ashvin Navarro MS, ALISHA, CHUYITA)  Nutrient intake appropriate for improving, restoring, or maintaining nutritional needs:   Assess nutritional status and recommend course of action   Monitor oral intake, labs, and treatment plans  10/27/2023 1212 by Ashvin Navarro MS, ALISHA, LD  Flowsheets (Taken 10/27/2023 1212)  Nutrient intake appropriate for improving, restoring, or maintaining nutritional needs:   Assess nutritional status and recommend course of action   Monitor oral intake, labs, and treatment plans     Problem: Skin/Tissue Integrity  Goal: Absence of new skin breakdown  Description: 1.   Monitor for areas of redness and/or Assist patient/family to identify coping skills, available support systems and cultural and spiritual values  2. Provide emotional support, including active listening and acknowledgement of concerns of patient and caregivers  3. Reduce environmental stimuli, as able  4. Instruct patient/family in relaxation techniques, as appropriate  5. Assess for spiritual pain/suffering and initiate Spiritual Care, Psychosocial Clinical Specialist consults as needed  Outcome: Progressing  Flowsheets  Taken 10/27/2023 1915 by India Saldivar RN  Patient/family able to verbalize anxieties, fears, and concerns, and demonstrate effective coping:   Assist patient/family to identify coping skills, available support systems and cultural and spiritual values   Provide emotional support, including active listening and acknowledgement of concerns of patient and caregivers  Taken 10/27/2023 0800 by Sofía Garcia RN  Patient/family able to verbalize anxieties, fears, and concerns, and demonstrate effective coping:   Assist patient/family to identify coping skills, available support systems and cultural and spiritual values   Provide emotional support, including active listening and acknowledgement of concerns of patient and caregivers     Care plan reviewed with patient. Patient unable to verbalize understanding of plan of care or contribute to goal setting due to intubation.

## 2023-10-28 NOTE — PLAN OF CARE
Problem: Respiratory - Adult  Goal: Achieves optimal ventilation and oxygenation  10/27/2023 2155 by Huma Davila RCP  Outcome: Progressing  Flowsheets (Taken 10/27/2023 2155)  Achieves optimal ventilation and oxygenation:   Assess for changes in respiratory status   Position to facilitate oxygenation and minimize respiratory effort   Assess the need for suctioning and aspirate as needed   Respiratory therapy support as indicated   Assess for changes in mentation and behavior   Oxygen supplementation based on oxygen saturation or arterial blood gases   Encourage broncho-pulmonary hygiene including cough, deep breathe, incentive spirometry   Assess and instruct to report shortness of breath or any respiratory difficulty  Note: Intubated//Vented. Wean as tolerated. SBT when appropriate.

## 2023-10-28 NOTE — PROGRESS NOTES
Notified at 2:45 AM by RN that patient had what seem to be a cuff leak, decreased saturation, and decreased tidal volumes. When at bedside, I could hear a leak from the ET tube and  noted that the ET tube was  slipping out of holster and was not at previous juan when checked at 0052 . Cuff was deflated, and I attempted to reinsert ET tube to its original juan of 23cm without resistance. Once ET tube was at the correct placement, vital signs improved. Patient had bilateral breath sounds. Tidal volumes were adequate. Patient was stable. Stat chest xray was ordered. Chest xray show ET tube was approx. 2.9 above the telma.      Electronically signed by Nicolás Smith RCP on 10/28/2023 at 3:50 AM

## 2023-10-28 NOTE — PROGRESS NOTES
Patient Weaning Progress    The patient's vent settings was able to be weaned this shift. Ventilator settings that were weaned              [x] Mode   [] Pressure support weaned   [] Fio2 weaned   [] Peep weaned      Spontaneous weaning trial  was attempted. The patient was able to tolerate SBT. RSBI  was 37 with EtCO2 of 37 and SpO2 of 96 on 30% FiO2. Spontanteous VT was 428 and RR 16 breaths/min. Evac tube was not  hooked up with continuous low suction(20-30mmHg)      Cuff was not  deflated to determine cuff leak. Unable to get agreement for goals because no family is present and patient cannot respond.

## 2023-10-28 NOTE — PROGRESS NOTES
8767 Patient ventilator alarming low tidal volumes. Patient noted to be tachypneic and slightly hypoxic. Patient with copious secretions. Upon assessment, ETT noted to be slipping out of holster presumably d/t amount of secretions. ETT was promptly stabilized. Dr. Gumaro Villalpando immediately called to bedside. Respiratory therapy called to bedside. The ETT was re-advanced to its previous measurement of 23 and a STAT chest X-ray was ordered. The patient's tidal volumes, tachypnea, and hypoxia improved. 0300 X-ray shows ETT measured approximately 2.9cm above the telma. Dr. Gumaro Villalpando aware. No new orders at this time. ENT was updated by Dr. Gumaro Villalpando.

## 2023-10-28 NOTE — PLAN OF CARE
Problem: Respiratory - Adult  Goal: Normal spontaneous ventilation  Outcome: Progressing   Vent setting optimized to achieve target tidal volume, respiratory rate and ideal oxygen saturations. SBT will be performed when appropriate. Patient Weaning Progress    The patient's vent settings was not able to be weaned this shift. Ventilator settings that were weaned              [] Mode   [] Pressure support weaned   [] Fio2 weaned   [] Peep weaned      Spontaneous weaning trial  was attempted. The patient was able to tolerate SBT. Cuff was not  deflated to determine cuff leak. Unable to get agreement for goals because no family is present and patient cannot respond.

## 2023-10-28 NOTE — PROGRESS NOTES
CRITICAL CARE PROGRESS NOTE      Patient:  Oscar Duff    Unit/Bed:4D-08/008-A  YOB: 1950  MRN: [id number]   PCP: Mayur Quinonez MD  Date of Admission: 10/12/2023  Chief Complaint:- tracheal stenosis s/p resection    Assessment and Plan:    Anoxic Brain Injury:  2/2 respiratory arrest and subsequent cardiac arrest following failed extubation 10/16   EEG 10/17: diffuse background attenuation and suppression w/out evidence of reactivity. No seizures. CT Head 10/17: no acute findings   MRI Brain 10/17: no acute findings   MRI Brain 10/19: No evidence of anoxia, no evidence of acute infarct   EEG 10/19: No seizures, moderate to severe encephalopathy   Continuous EEG starting 10/20   No seizures, improved compared to EEG on 10/19 and significantly improved from 10/17  3% @ 15mL/hr with goal Na 145-150 for concerns of cerebral edema, HELD 10/20   Neuro following, started Provigil 200mg   Seizure prophylaxis with Keppra and Dilantin added by neurology   On zosyn and vancomycin for surgical site infection prophylaxis   Zosyn and Vancomycin D/C 10/24   S/p cricotracheal resection, laryngoplasty with flap and vocal fold lateralization stitch placement: with Dr. Sol Reeder   POD 15  HOB at 30 degrees with pillow under head for semi-flexed position   Right RACHEL drain removed by ENT 10/27  Monitor RACHEL drain, 20mL output last 24 hrs   10/18: 5.0 ET tube exchanged for 7.0 ET tube by Dr. Sol Reeder   Bilateral DVTs, superficial:    Venous Duplex Lower Extremity 10/12: thrombus in BL posterior tibial veins and right peroneal vein   Patient off of heparin and started on Lovenox   Lovenox held by ENT due to blood around surgical site 10/20  Lovenox resumed 10/23, d/c 10/26  Lovenox resumed again after Hb stabilized 10/28  Venous Duplex Upper Extremities 10/23: Left axillary, brachial, basilic and cephalic vein thrombus.  Right cephalic and antecubital thrombosis   DM Type 2:  01/2023 HbA1c: 5.5   Patient on 17 units Lantus nightly   High dose SSI   Chronic diastolic CHF:  History   ECHO 1/2/23: EF 60-65%  Strict I/O   Monitor for fluid overload  10/21: CXR: Worsening interstitial edema, right perihilar subsegmental atelectasis   Patient on Bumex 0.5mg   Leukocytosis: WBC has been trending up the past couple of days  WBC: 10.3-->12.8-->15.3-->17.2 --> 13.8--> 9.1--> 9.3--> 7.9  Patient has remained afebrile   CXR unremarkable   Urinalysis suggestive of fungal growth  HTN:   Amlodipine 5mg daily, held   Carvedilol 6.25mg BID, held   Lopressor 5mg q4hr PRN for SBP > 160  Anemia:   Patient restarted on lovenox 10/23   Lovenox held 10/26 due to drop in hemoglobin   Lovenox resumed 10/28  Hb: 8.5-->7.4-->7.5-->6.7 -->8.0-->8.2 -->8.8  No signs of active bleeding, no increase in output from drains, no blood in stool  Will monitor     INITIAL H AND P AND ICU COURSE:  Per chart review:     \"81 yo F w/significant PMHx of  CAD, HLD, NIDDM2, H/o provoked DVT/PE after hip surgery (not on [address]), Obesity, H/o hip fracture s/p ORIF, H/o melena / diverticulitis, and COVID-19 infection s/p Trach (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 James Morataya. Pt is POD #0 and transferred to ICU for close airway monitoring. On arrival to ICU, pt intubated and sedated, 2-pillow prop to maintain forward flexion of neck at all times, b/l suction-bulb drains in neck, midline transverse surgical incision site noted on anterior neck. \"     \"10/13: No acute events overnight. 10/14: Yesterday morning, CXR revealed a R mainstem intubation, it was retracted 2.5 cm and repeat CXR confirms placement above the telma. 10/15: Yesterday Pt had bloody sputum emerge from outside the ETT tube.  Based on CXR, POCUS, and I & O's it was suspected she had volume

## 2023-10-28 NOTE — SIGNIFICANT EVENT
ICU Significant Event Note   Notified at approximately 2:45 AM by RN that patient having increased secretions, cough, and ventilator was alarming low tidal volumes. RT and myself present at bedside. ET tube noted to be slipping out of holster and marked lip was approximately 19 to 20 cm. Previously ET tube was measuring at 23 cm. I attempted to call Dr. Imelda Olszewski through [facility], however call was dropped. At the time that I was attempting to call Dr. Imelda Olszewski, RT stabilized ET tube and readvanced tube to its previous measurement of 23 cm. Patient's tidal volumes, tachypnea, and hypoxia greatly improved. Stat chest x-ray was ordered and shows ET tube measuring approximately 2.9 smears above the telma. I notified Zhanna Weinberg NP, with ENT about this event. I informed her that patient had been stabilized. Zhanna inform Dr. Imelda Olszewski about this event. No new recommendations per ENT at this time. Patient continues to be stable with no excess secretions, tachypnea, hypoxia, or cough.       Electronically signed by Magdalena Hughes DO on 10/28/2023 at 3:23 AM

## 2023-10-29 LAB
ANION GAP SERPL CALC-SCNC: 8 MEQ/L (ref 8–16)
BUN SERPL-MCNC: 22 MG/DL (ref 7–22)
CALCIUM SERPL-MCNC: 8.2 MG/DL (ref 8.5–10.5)
CHLORIDE SERPL-SCNC: 107 MEQ/L (ref 98–111)
CO2 SERPL-SCNC: 27 MEQ/L (ref 23–33)
CREAT SERPL-MCNC: 0.6 MG/DL (ref 0.4–1.2)
DEPRECATED RDW RBC AUTO: 55 FL (ref 35–45)
ERYTHROCYTE [DISTWIDTH] IN BLOOD BY AUTOMATED COUNT: 15.9 % (ref 11.5–14.5)
GFR SERPL CREATININE-BSD FRML MDRD: > 60 ML/MIN/1.73M2
GLUCOSE BLD STRIP.AUTO-MCNC: 151 MG/DL (ref 70–108)
GLUCOSE BLD STRIP.AUTO-MCNC: 160 MG/DL (ref 70–108)
GLUCOSE BLD STRIP.AUTO-MCNC: 177 MG/DL (ref 70–108)
GLUCOSE BLD STRIP.AUTO-MCNC: 192 MG/DL (ref 70–108)
GLUCOSE BLD STRIP.AUTO-MCNC: 220 MG/DL (ref 70–108)
GLUCOSE SERPL-MCNC: 171 MG/DL (ref 70–108)
HCT VFR BLD AUTO: 26.6 % (ref 37–47)
HGB BLD-MCNC: 8.2 GM/DL (ref 12–16)
MCH RBC QN AUTO: 29.7 PG (ref 26–33)
MCHC RBC AUTO-ENTMCNC: 30.8 GM/DL (ref 32.2–35.5)
MCV RBC AUTO: 96.4 FL (ref 81–99)
PLATELET # BLD AUTO: 254 THOU/MM3 (ref 130–400)
PMV BLD AUTO: 10.5 FL (ref 9.4–12.4)
POTASSIUM SERPL-SCNC: 3.9 MEQ/L (ref 3.5–5.2)
RBC # BLD AUTO: 2.76 MILL/MM3 (ref 4.2–5.4)
SODIUM SERPL-SCNC: 142 MEQ/L (ref 135–145)
WBC # BLD AUTO: 7.4 THOU/MM3 (ref 4.8–10.8)

## 2023-10-29 PROCEDURE — 36415 COLL VENOUS BLD VENIPUNCTURE: CPT

## 2023-10-29 PROCEDURE — 6360000002 HC RX W HCPCS: Performed by: EMERGENCY MEDICINE

## 2023-10-29 PROCEDURE — 82948 REAGENT STRIP/BLOOD GLUCOSE: CPT

## 2023-10-29 PROCEDURE — 2700000000 HC OXYGEN THERAPY PER DAY

## 2023-10-29 PROCEDURE — 80048 BASIC METABOLIC PNL TOTAL CA: CPT

## 2023-10-29 PROCEDURE — 6370000000 HC RX 637 (ALT 250 FOR IP): Performed by: NURSE PRACTITIONER

## 2023-10-29 PROCEDURE — 6360000002 HC RX W HCPCS: Performed by: PHYSICIAN ASSISTANT

## 2023-10-29 PROCEDURE — 6370000000 HC RX 637 (ALT 250 FOR IP)

## 2023-10-29 PROCEDURE — 94761 N-INVAS EAR/PLS OXIMETRY MLT: CPT

## 2023-10-29 PROCEDURE — 2500000003 HC RX 250 WO HCPCS: Performed by: INTERNAL MEDICINE

## 2023-10-29 PROCEDURE — 6370000000 HC RX 637 (ALT 250 FOR IP): Performed by: EMERGENCY MEDICINE

## 2023-10-29 PROCEDURE — 99233 SBSQ HOSP IP/OBS HIGH 50: CPT | Performed by: INTERNAL MEDICINE

## 2023-10-29 PROCEDURE — 2580000003 HC RX 258: Performed by: EMERGENCY MEDICINE

## 2023-10-29 PROCEDURE — 6360000002 HC RX W HCPCS: Performed by: INTERNAL MEDICINE

## 2023-10-29 PROCEDURE — 94003 VENT MGMT INPAT SUBQ DAY: CPT

## 2023-10-29 PROCEDURE — 2000000000 HC ICU R&B

## 2023-10-29 PROCEDURE — 85027 COMPLETE CBC AUTOMATED: CPT

## 2023-10-29 PROCEDURE — 2580000003 HC RX 258: Performed by: NURSE PRACTITIONER

## 2023-10-29 RX ORDER — ENOXAPARIN SODIUM 100 MG/ML
1 INJECTION SUBCUTANEOUS EVERY 12 HOURS
Status: DISCONTINUED | OUTPATIENT
Start: 2023-10-29 | End: 2023-11-22

## 2023-10-29 RX ADMIN — PHENYTOIN SODIUM 150 MG: 50 INJECTION INTRAMUSCULAR; INTRAVENOUS at 20:06

## 2023-10-29 RX ADMIN — PHENYTOIN SODIUM 50 MG: 50 INJECTION INTRAMUSCULAR; INTRAVENOUS at 09:12

## 2023-10-29 RX ADMIN — PHENYTOIN SODIUM 150 MG: 50 INJECTION INTRAMUSCULAR; INTRAVENOUS at 09:16

## 2023-10-29 RX ADMIN — MICONAZOLE NITRATE: 2 POWDER TOPICAL at 20:06

## 2023-10-29 RX ADMIN — LEVETIRACETAM 1000 MG: 100 INJECTION, SOLUTION INTRAVENOUS at 17:59

## 2023-10-29 RX ADMIN — FAMOTIDINE 20 MG: 20 TABLET ORAL at 20:06

## 2023-10-29 RX ADMIN — ENOXAPARIN SODIUM 90 MG: 100 INJECTION SUBCUTANEOUS at 20:06

## 2023-10-29 RX ADMIN — CHLORHEXIDINE GLUCONATE 0.12% ORAL RINSE 15 ML: 1.2 LIQUID ORAL at 09:12

## 2023-10-29 RX ADMIN — CHLORHEXIDINE GLUCONATE 0.12% ORAL RINSE 15 ML: 1.2 LIQUID ORAL at 20:06

## 2023-10-29 RX ADMIN — MODAFINIL 200 MG: 100 TABLET ORAL at 09:12

## 2023-10-29 RX ADMIN — BUMETANIDE 0.5 MG: 0.25 INJECTION INTRAMUSCULAR; INTRAVENOUS at 17:59

## 2023-10-29 RX ADMIN — MICONAZOLE NITRATE: 2 POWDER TOPICAL at 09:13

## 2023-10-29 RX ADMIN — INSULIN GLARGINE 17 UNITS: 100 INJECTION, SOLUTION SUBCUTANEOUS at 20:11

## 2023-10-29 RX ADMIN — SODIUM CHLORIDE, PRESERVATIVE FREE 10 ML: 5 INJECTION INTRAVENOUS at 09:12

## 2023-10-29 RX ADMIN — LEVETIRACETAM 1000 MG: 100 INJECTION, SOLUTION INTRAVENOUS at 05:24

## 2023-10-29 RX ADMIN — SODIUM CHLORIDE, PRESERVATIVE FREE 10 ML: 5 INJECTION INTRAVENOUS at 20:07

## 2023-10-29 RX ADMIN — ENOXAPARIN SODIUM 70 MG: 100 INJECTION SUBCUTANEOUS at 09:12

## 2023-10-29 RX ADMIN — FAMOTIDINE 20 MG: 20 TABLET ORAL at 09:12

## 2023-10-29 ASSESSMENT — PULMONARY FUNCTION TESTS
PIF_VALUE: 15
PIF_VALUE: 15
PIF_VALUE: 13
PIF_VALUE: 15
PIF_VALUE: 16
PIF_VALUE: 14
PIF_VALUE: 14
PIF_VALUE: 15

## 2023-10-29 ASSESSMENT — PAIN SCALES - GENERAL: PAINLEVEL_OUTOF10: 0

## 2023-10-29 NOTE — PLAN OF CARE
Problem: Discharge Planning  Goal: Discharge to home or other facility with appropriate resources  10/28/2023 2012 by Pola Raines RN  Outcome: Not Progressing  Flowsheets (Taken 10/28/2023 1930)  Discharge to home or other facility with appropriate resources: Identify barriers to discharge with patient and caregiver  10/28/2023 1003 by Paula Lopez RN  Outcome: Not Progressing  Flowsheets (Taken 10/28/2023 0800)  Discharge to home or other facility with appropriate resources: Identify barriers to discharge with patient and caregiver     Problem: Neurosensory - Adult  Goal: Achieves stable or improved neurological status  10/28/2023 2012 by Pola Raines RN  Outcome: Not Progressing  Flowsheets (Taken 10/28/2023 1930)  Achieves stable or improved neurological status:   Assess for and report changes in neurological status   Initiate measures to prevent increased intracranial pressure  10/28/2023 1003 by Paula Lopez RN  Outcome: Not Progressing  Flowsheets (Taken 10/28/2023 0800)  Achieves stable or improved neurological status:   Assess for and report changes in neurological status   Initiate measures to prevent increased intracranial pressure     Care plan reviewed with patient and family. Family verbalizes understanding of the plan of care and contributes to goal setting. Patient unable to participate in plan of care or contribute to goal setting due to intubation.

## 2023-10-29 NOTE — PROGRESS NOTES
Patient Weaning Progress    The patient's vent settings was able to be weaned this shift. Ventilator settings that were weaned              [x] Mode   [] Pressure support weaned   [] Fio2 weaned   [] Peep weaned      Spontaneous weaning trial  was attempted. The patient was able to tolerate SBT. RSBI  was 35 with EtCO2 of 37 and SpO2 of 95 on 30% FiO2. Spontanteous VT was 479 and RR 17 breaths/min. Evac tube was not  hooked up with continuous low suction(20-30mmHg)      Cuff was not  deflated to determine cuff leak. Unable to get agreement for goals because no family is present and patient cannot respond.

## 2023-10-29 NOTE — PROGRESS NOTES
Pharmacy  Adjustment    Pharmacy adjusted the following medication(s) per P&T approved policy: Lovenox    Recent Labs     10/28/23  0322 10/29/23  0310   BUN 20 22   CREATININE 0.6 0.6     Estimated Creatinine Clearance: 91 mL/min (based on SCr of 0.6 mg/dL). Weight = 90.7    Plan:   Adjust Lovenox 1 mg/kg Q12H to use most recent weight = 90 kg      Please call pharmacy with any questions.     Dakota Giang PharmD 10/29/2023 3:12 PM

## 2023-10-29 NOTE — PLAN OF CARE
Problem: Respiratory - Adult  Goal: Achieves optimal ventilation and oxygenation  10/29/2023 0959 by Elda GARCIA RCP  Outcome: Progressing  Vent setting optimized to achieve target tidal volume, respiratory rate and ideal oxygen saturations. SBT will be performed when appropriate. Patient Weaning Progress    The patient's vent settings was not able to be weaned this shift. Ventilator settings that were weaned              [] Mode   [] Pressure support weaned   [] Fio2 weaned   [] Peep weaned      Spontaneous weaning trial  was attempted. The patient was able to tolerate SBT. Spontanteous VT was 350 and RR 18 breaths/min. Unable to get agreement for goals because no family is present and patient cannot respond.

## 2023-10-29 NOTE — FLOWSHEET NOTE
1400: Patients  brought in patients \"rama coin\" to place at bedside. Said coin is a silver coin. This RN placed it in a Tegaderm and hung it on the ventilator arm. This RN informed  of  coin being at risk of being lost,  was okay with chances.

## 2023-10-30 LAB
ANION GAP SERPL CALC-SCNC: 9 MEQ/L (ref 8–16)
BUN SERPL-MCNC: 24 MG/DL (ref 7–22)
CALCIUM SERPL-MCNC: 8.4 MG/DL (ref 8.5–10.5)
CHLORIDE SERPL-SCNC: 102 MEQ/L (ref 98–111)
CO2 SERPL-SCNC: 27 MEQ/L (ref 23–33)
CREAT SERPL-MCNC: 0.5 MG/DL (ref 0.4–1.2)
DEPRECATED RDW RBC AUTO: 55.1 FL (ref 35–45)
ERYTHROCYTE [DISTWIDTH] IN BLOOD BY AUTOMATED COUNT: 16 % (ref 11.5–14.5)
GFR SERPL CREATININE-BSD FRML MDRD: > 60 ML/MIN/1.73M2
GLUCOSE BLD STRIP.AUTO-MCNC: 178 MG/DL (ref 70–108)
GLUCOSE BLD STRIP.AUTO-MCNC: 197 MG/DL (ref 70–108)
GLUCOSE BLD STRIP.AUTO-MCNC: 198 MG/DL (ref 70–108)
GLUCOSE BLD STRIP.AUTO-MCNC: 211 MG/DL (ref 70–108)
GLUCOSE BLD STRIP.AUTO-MCNC: 214 MG/DL (ref 70–108)
GLUCOSE SERPL-MCNC: 195 MG/DL (ref 70–108)
HCT VFR BLD AUTO: 26.3 % (ref 37–47)
HGB BLD-MCNC: 8.3 GM/DL (ref 12–16)
MCH RBC QN AUTO: 30.1 PG (ref 26–33)
MCHC RBC AUTO-ENTMCNC: 31.6 GM/DL (ref 32.2–35.5)
MCV RBC AUTO: 95.3 FL (ref 81–99)
PLATELET # BLD AUTO: 272 THOU/MM3 (ref 130–400)
PMV BLD AUTO: 10.2 FL (ref 9.4–12.4)
POTASSIUM SERPL-SCNC: 4.2 MEQ/L (ref 3.5–5.2)
RBC # BLD AUTO: 2.76 MILL/MM3 (ref 4.2–5.4)
SODIUM SERPL-SCNC: 138 MEQ/L (ref 135–145)
WBC # BLD AUTO: 7.6 THOU/MM3 (ref 4.8–10.8)

## 2023-10-30 PROCEDURE — 6370000000 HC RX 637 (ALT 250 FOR IP): Performed by: NURSE PRACTITIONER

## 2023-10-30 PROCEDURE — 2500000003 HC RX 250 WO HCPCS: Performed by: INTERNAL MEDICINE

## 2023-10-30 PROCEDURE — 6370000000 HC RX 637 (ALT 250 FOR IP): Performed by: INTERNAL MEDICINE

## 2023-10-30 PROCEDURE — 6360000002 HC RX W HCPCS: Performed by: INTERNAL MEDICINE

## 2023-10-30 PROCEDURE — 94761 N-INVAS EAR/PLS OXIMETRY MLT: CPT

## 2023-10-30 PROCEDURE — 6360000002 HC RX W HCPCS: Performed by: EMERGENCY MEDICINE

## 2023-10-30 PROCEDURE — 85027 COMPLETE CBC AUTOMATED: CPT

## 2023-10-30 PROCEDURE — 94003 VENT MGMT INPAT SUBQ DAY: CPT

## 2023-10-30 PROCEDURE — 2500000003 HC RX 250 WO HCPCS

## 2023-10-30 PROCEDURE — 2580000003 HC RX 258: Performed by: EMERGENCY MEDICINE

## 2023-10-30 PROCEDURE — 6370000000 HC RX 637 (ALT 250 FOR IP): Performed by: EMERGENCY MEDICINE

## 2023-10-30 PROCEDURE — 2700000000 HC OXYGEN THERAPY PER DAY

## 2023-10-30 PROCEDURE — 6360000002 HC RX W HCPCS: Performed by: PHYSICIAN ASSISTANT

## 2023-10-30 PROCEDURE — 36415 COLL VENOUS BLD VENIPUNCTURE: CPT

## 2023-10-30 PROCEDURE — 99024 POSTOP FOLLOW-UP VISIT: CPT | Performed by: PHYSICIAN ASSISTANT

## 2023-10-30 PROCEDURE — 2000000000 HC ICU R&B

## 2023-10-30 PROCEDURE — 82948 REAGENT STRIP/BLOOD GLUCOSE: CPT

## 2023-10-30 PROCEDURE — 2580000003 HC RX 258: Performed by: NURSE PRACTITIONER

## 2023-10-30 PROCEDURE — 80048 BASIC METABOLIC PNL TOTAL CA: CPT

## 2023-10-30 PROCEDURE — 99291 CRITICAL CARE FIRST HOUR: CPT | Performed by: INTERNAL MEDICINE

## 2023-10-30 RX ORDER — INSULIN GLARGINE 100 [IU]/ML
18 INJECTION, SOLUTION SUBCUTANEOUS NIGHTLY
Status: DISCONTINUED | OUTPATIENT
Start: 2023-10-30 | End: 2023-11-07

## 2023-10-30 RX ADMIN — FAMOTIDINE 20 MG: 20 TABLET ORAL at 20:48

## 2023-10-30 RX ADMIN — PHENYTOIN SODIUM 150 MG: 50 INJECTION INTRAMUSCULAR; INTRAVENOUS at 20:49

## 2023-10-30 RX ADMIN — MICONAZOLE NITRATE: 2 POWDER TOPICAL at 09:46

## 2023-10-30 RX ADMIN — LEVETIRACETAM 1000 MG: 100 INJECTION, SOLUTION INTRAVENOUS at 17:54

## 2023-10-30 RX ADMIN — MICONAZOLE NITRATE: 2 POWDER TOPICAL at 20:57

## 2023-10-30 RX ADMIN — MODAFINIL 200 MG: 100 TABLET ORAL at 09:44

## 2023-10-30 RX ADMIN — BUMETANIDE 0.5 MG: 0.25 INJECTION INTRAMUSCULAR; INTRAVENOUS at 17:55

## 2023-10-30 RX ADMIN — LEVETIRACETAM 1000 MG: 100 INJECTION, SOLUTION INTRAVENOUS at 05:42

## 2023-10-30 RX ADMIN — SODIUM CHLORIDE, PRESERVATIVE FREE 10 ML: 5 INJECTION INTRAVENOUS at 09:46

## 2023-10-30 RX ADMIN — ENOXAPARIN SODIUM 90 MG: 100 INJECTION SUBCUTANEOUS at 20:48

## 2023-10-30 RX ADMIN — CHLORHEXIDINE GLUCONATE 0.12% ORAL RINSE 15 ML: 1.2 LIQUID ORAL at 09:44

## 2023-10-30 RX ADMIN — INSULIN LISPRO 4 UNITS: 100 INJECTION, SOLUTION INTRAVENOUS; SUBCUTANEOUS at 18:11

## 2023-10-30 RX ADMIN — CHLORHEXIDINE GLUCONATE 0.12% ORAL RINSE 15 ML: 1.2 LIQUID ORAL at 20:30

## 2023-10-30 RX ADMIN — PHENYTOIN SODIUM 50 MG: 50 INJECTION INTRAMUSCULAR; INTRAVENOUS at 09:45

## 2023-10-30 RX ADMIN — PHENYTOIN SODIUM 150 MG: 50 INJECTION INTRAMUSCULAR; INTRAVENOUS at 09:45

## 2023-10-30 RX ADMIN — INSULIN GLARGINE 18 UNITS: 100 INJECTION, SOLUTION SUBCUTANEOUS at 20:48

## 2023-10-30 RX ADMIN — SODIUM CHLORIDE, PRESERVATIVE FREE 10 ML: 5 INJECTION INTRAVENOUS at 20:48

## 2023-10-30 RX ADMIN — ENOXAPARIN SODIUM 90 MG: 100 INJECTION SUBCUTANEOUS at 09:44

## 2023-10-30 RX ADMIN — FAMOTIDINE 20 MG: 20 TABLET ORAL at 09:44

## 2023-10-30 ASSESSMENT — PULMONARY FUNCTION TESTS
PIF_VALUE: 15
PIF_VALUE: 16
PIF_VALUE: 15

## 2023-10-30 NOTE — PROGRESS NOTES
Comprehensive Nutrition Assessment    Type and Reason for Visit:  Reassess (Tube Feed Monitor)    Nutrition Recommendations/Plan:   Continue current tubefeeding regimen: Vital 1.2 bolus 215 ml every 4 hours to better meet nutritional needs. Free water flush 30 ml before and after each bolus (or per provider). Malnutrition Assessment:  Malnutrition Status:  Insufficient data (10/13/23 1128)    Context:  Chronic Illness     Findings of the 6 clinical characteristics of malnutrition:  Energy Intake:  Unable to assess (pt. intubated)  Weight Loss:  Greater than 10% over 6 months (31# or 16% in 5 months)     Body Fat Loss:  Unable to assess (facial edema; RN asked not to disturb pt. this am)     Muscle Mass Loss:  Unable to assess    Fluid Accumulation:  Mild     Strength:  Not Performed    Nutrition Assessment:     Pt improving from a nutritional standpoint AEB pt NPO, intubated and is tolerating Vital 1.2 bolus tube feed at goal. 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       Nutrition Related Findings:    Pt. Report/Treatments/Miscellaneous: intubated, RN reports tolerating Vital 1.2 tube feed bolus 215ml every 4 hours, note plan per ENT to continue current tube feed regimen   GI Status: last BM 10/27, previous report of diarrhea on 10/26  Pertinent Labs: Sodium 138, Potassium 4.2, BUN 24, Creatinine 0.5, Glucose 195  Pertinent Meds: bumex, lantus, humalog, keppra, IV dilantin, prn fibercon, prn glycolax      Wound Type: Pressure Injury, Stage II (coccyx; stage 1 buttocks; skin tear abdomen; 10/12 ENT surgery: Cricotracheal Resection, Laryngoplasty with Flap and Vocal Fold Lateralization Stitch Placement)       Current

## 2023-10-30 NOTE — PLAN OF CARE
Problem: Discharge Planning  Goal: Discharge to home or other facility with appropriate resources  Outcome: Progressing  Flowsheets (Taken 10/29/2023 2000)  Discharge to home or other facility with appropriate resources: Identify barriers to discharge with patient and caregiver     Problem: Chronic Conditions and Co-morbidities  Goal: Patient's chronic conditions and co-morbidity symptoms are monitored and maintained or improved  Outcome: Progressing  Flowsheets (Taken 10/29/2023 2000)  Care Plan - Patient's Chronic Conditions and Co-Morbidity Symptoms are Monitored and Maintained or Improved: Monitor and assess patient's chronic conditions and comorbid symptoms for stability, deterioration, or improvement     Problem: Safety - Adult  Goal: Free from fall injury  Outcome: Progressing     Problem: Pain  Goal: Verbalizes/displays adequate comfort level or baseline comfort level  Outcome: Progressing     Problem: Respiratory - Adult  Goal: Achieves optimal ventilation and oxygenation  10/29/2023 2203 by Theo Lester RN  Outcome: Progressing  10/29/2023 0959 by Suki Soto RCP  Outcome: Progressing  Flowsheets (Taken 10/29/2023 0800 by Nancy Trinidad RN)  Achieves optimal ventilation and oxygenation:   Assess for changes in respiratory status   Assess for changes in mentation and behavior   Position to facilitate oxygenation and minimize respiratory effort     Problem: Nutrition Deficit:  Goal: Optimize nutritional status  Outcome: Progressing     Problem: Skin/Tissue Integrity  Goal: Absence of new skin breakdown  Description: 1. Monitor for areas of redness and/or skin breakdown  2. Assess vascular access sites hourly  3. Every 4-6 hours minimum:  Change oxygen saturation probe site  4. Every 4-6 hours:  If on nasal continuous positive airway pressure, respiratory therapy assess nares and determine need for appliance change or resting period.   Outcome: Progressing     Problem: Neurosensory -

## 2023-10-30 NOTE — PLAN OF CARE
Problem: Respiratory - Adult  Goal: Achieves optimal ventilation and oxygenation  Outcome: Progressing  Achieves optimal ventilation and oxygenation:   Assess for changes in respiratory status   Position to facilitate oxygenation and minimize respiratory effort   Assess the need for suctioning and aspirate as needed   Respiratory therapy support as indicated   Assess for changes in mentation and behavior   Oxygen supplementation based on oxygen saturation or arterial blood gases   Encourage broncho-pulmonary hygiene including cough, deep breathe, incentive spirometry   Assess and instruct to report shortness of breath or any respiratory difficulty  Note: Intubated//Vented. Wean as tolerated. SBT when appropriate.

## 2023-10-30 NOTE — PROGRESS NOTES
Patient intubated and not responsive. Patient's  did not have voice mail; contacted patient's daughter Ricky Owusu. Kimberly shares the past 2.5 weeks have been stressful for the whole family. They are holding on to hope that her mom will improve. Explained availability of chaplains. 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 Kimberly states that her dad is an atheist and was likely rude to . Explained that the support we provide is about the spirit more than about Hinduism- we meet people where they are. She appreciate that insight and will talk to him about being more open to support.

## 2023-10-30 NOTE — PROGRESS NOTES
10/30/23 0815   Encounter Summary   Encounter Overview/Reason  Attempted Encounter   Service Provided For: Family   Referral/Consult From: [facility]   Support System Children;Spouse   Last Encounter  10/30/23  (N/R)   Complexity of Encounter Low   Begin Time 0810   End Time  0815   Total Time Calculated 5 min   Spiritual/Emotional needs   Type Spiritual Support   Assessment/Intervention/Outcome   Assessment Unable to assess   Intervention Sustaining Presence/Ministry of presence;Prayer (assurance of)/[place]     In my encounter with the 81 yr old patient, I attempted to see the patient in ICU, but the patient was unresponsive at this time. No family was present in the room. I offered a prayer at the pt's side. A  will attempt to see the patient at a later time as a follow up. The pt was admitted due to tracheal stenosis due to tracheostomy.

## 2023-10-30 NOTE — PROGRESS NOTES
CRITICAL CARE PROGRESS NOTE      Patient:  [facility]    Unit/Bed:4D-08/008-A  YOB: 1950  MRN: [id number]   PCP: Jason Miranda MD  Date of Admission: 10/12/2023  Chief Complaint:- tracheal stenosis s/p resection    Assessment and Plan:    Anoxic Brain Injury:  2/2 respiratory arrest and subsequent cardiac arrest following failed extubation 10/16   EEG 10/17: diffuse background attenuation and suppression w/out evidence of reactivity. No seizures. CT Head 10/17: no acute findings   MRI Brain 10/17: no acute findings   MRI Brain 10/19: No evidence of anoxia, no evidence of acute infarct   EEG 10/19: No seizures, moderate to severe encephalopathy   Continuous EEG starting 10/20   No seizures, improved compared to EEG on 10/19 and significantly improved from 10/17  3% @ 15mL/hr with goal Na 145-150 for concerns of cerebral edema, HELD 10/20   Neuro following, started Provigil 200mg   Seizure prophylaxis with Keppra and Dilantin added by neurology   On zosyn and vancomycin for surgical site infection prophylaxis   Zosyn and Vancomycin D/C 10/24   S/p cricotracheal resection, laryngoplasty with flap and vocal fold lateralization stitch placement: with Dr. Sugar Iqbal   POD 18  HOB at 30 degrees with pillow under head for semi-flexed position   Right RACHEL drain removed by ENT 10/27  Monitor RACHEL drain, 2 mL output last 24 hrs   10/18: 5.0 ET tube exchanged for 7.0 ET tube by Dr. Sugar Iqbal   Bilateral DVTs, superficial:    Venous Duplex Lower Extremity 10/12: thrombus in BL posterior tibial veins and right peroneal vein   Patient off of heparin and started on Lovenox   Lovenox held by ENT due to blood around surgical site 10/20  Lovenox resumed 10/23, d/c 10/26  Lovenox resumed again after Hb stabilized 10/28  Venous Duplex Upper Extremities 10/23: Left axillary, brachial, basilic and cephalic vein thrombus.  Right cephalic and antecubital thrombosis   DM Type 2:  01/2023 HbA1c: 5.5   Patient on 17 units Lantus nightly   High dose SSI   Chronic diastolic CHF:  History   ECHO 1/2/23: EF 60-65%  Strict I/O   Monitor for fluid overload  10/21: CXR: Worsening interstitial edema, right perihilar subsegmental atelectasis   Patient on Bumex 0.5mg   Leukocytosis: WBC has been trending up the past couple of days  WBC: 10.3-->12.8-->15.3-->17.2 --> 13.8--> 9.1--> 9.3--> 7.9--> 7.4-->7.6  Patient has remained afebrile   CXR unremarkable   Urinalysis suggestive of fungal growth  HTN:   Amlodipine 5mg daily, held   Carvedilol 6.25mg BID, held   Lopressor 5mg q4hr PRN for SBP > 160  Anemia:   Patient restarted on lovenox 10/23   Lovenox held 10/26 due to drop in hemoglobin   Lovenox resumed 10/28  Hb: 8.5-->7.4-->7.5-->6.7 -->8.0-->8.2 -->8.8--> 8.2-->8.3  No signs of active bleeding, no increase in output from drains, no blood in stool  Will monitor     INITIAL H AND P AND ICU COURSE:  Per chart review:     \"81 yo F w/significant PMHx of  CAD, HLD, NIDDM2, H/o provoked DVT/PE after hip surgery (not on [address]), Obesity, H/o hip fracture s/p ORIF, H/o melena / diverticulitis, and COVID-19 infection s/p Trach (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 Deandra Slaughter. Pt is POD #0 and transferred to ICU for close airway monitoring. On arrival to ICU, pt intubated and sedated, 2-pillow prop to maintain forward flexion of neck at all times, b/l suction-bulb drains in neck, midline transverse surgical incision site noted on anterior neck. \"     \"10/13: No acute events overnight. 10/14: Yesterday morning, CXR revealed a R mainstem intubation, it was retracted 2.5 cm and repeat CXR confirms placement above the telma. 10/15: Yesterday Pt had bloody sputum emerge from outside the ETT tube.  Based on CXR, POCUS, and I & O's it was

## 2023-10-30 NOTE — PROGRESS NOTES
Patient Weaning Progress    The patient's vent settings was able to be weaned this shift. Ventilator settings that were weaned              [x] Mode   [] Pressure support weaned   [] Fio2 weaned   [] Peep weaned      Spontaneous weaning trial  was attempted. The patient was able to tolerate SBT. RSBI  was 44 with EtCO2 of 38 and SpO2 of 97 on 30% FiO2. Spontanteous VT was 402 and RR 18 breaths/min. Cuff was not  deflated to determine cuff leak. Unable to get agreement for goals because no family is present and patient cannot respond.

## 2023-10-31 LAB
ANION GAP SERPL CALC-SCNC: 7 MEQ/L (ref 8–16)
BUN SERPL-MCNC: 26 MG/DL (ref 7–22)
CA-I BLD ISE-SCNC: 1.18 MMOL/L (ref 1.12–1.32)
CALCIUM SERPL-MCNC: 8.4 MG/DL (ref 8.5–10.5)
CHLORIDE SERPL-SCNC: 103 MEQ/L (ref 98–111)
CO2 SERPL-SCNC: 27 MEQ/L (ref 23–33)
CREAT SERPL-MCNC: 0.6 MG/DL (ref 0.4–1.2)
DEPRECATED RDW RBC AUTO: 56.4 FL (ref 35–45)
ERYTHROCYTE [DISTWIDTH] IN BLOOD BY AUTOMATED COUNT: 15.9 % (ref 11.5–14.5)
GFR SERPL CREATININE-BSD FRML MDRD: > 60 ML/MIN/1.73M2
GLUCOSE BLD STRIP.AUTO-MCNC: 180 MG/DL (ref 70–108)
GLUCOSE BLD STRIP.AUTO-MCNC: 193 MG/DL (ref 70–108)
GLUCOSE BLD STRIP.AUTO-MCNC: 206 MG/DL (ref 70–108)
GLUCOSE BLD STRIP.AUTO-MCNC: 206 MG/DL (ref 70–108)
GLUCOSE BLD STRIP.AUTO-MCNC: 230 MG/DL (ref 70–108)
GLUCOSE SERPL-MCNC: 187 MG/DL (ref 70–108)
HCT VFR BLD AUTO: 26.7 % (ref 37–47)
HGB BLD-MCNC: 8.4 GM/DL (ref 12–16)
MCH RBC QN AUTO: 30.2 PG (ref 26–33)
MCHC RBC AUTO-ENTMCNC: 31.5 GM/DL (ref 32.2–35.5)
MCV RBC AUTO: 96 FL (ref 81–99)
PLATELET # BLD AUTO: 251 THOU/MM3 (ref 130–400)
PMV BLD AUTO: 9.9 FL (ref 9.4–12.4)
POTASSIUM SERPL-SCNC: 4.1 MEQ/L (ref 3.5–5.2)
RBC # BLD AUTO: 2.78 MILL/MM3 (ref 4.2–5.4)
SODIUM SERPL-SCNC: 137 MEQ/L (ref 135–145)
WBC # BLD AUTO: 7.4 THOU/MM3 (ref 4.8–10.8)

## 2023-10-31 PROCEDURE — 82948 REAGENT STRIP/BLOOD GLUCOSE: CPT

## 2023-10-31 PROCEDURE — 82330 ASSAY OF CALCIUM: CPT

## 2023-10-31 PROCEDURE — 6360000002 HC RX W HCPCS: Performed by: PHYSICIAN ASSISTANT

## 2023-10-31 PROCEDURE — 94003 VENT MGMT INPAT SUBQ DAY: CPT

## 2023-10-31 PROCEDURE — 80048 BASIC METABOLIC PNL TOTAL CA: CPT

## 2023-10-31 PROCEDURE — 2000000000 HC ICU R&B

## 2023-10-31 PROCEDURE — 36415 COLL VENOUS BLD VENIPUNCTURE: CPT

## 2023-10-31 PROCEDURE — 2580000003 HC RX 258: Performed by: EMERGENCY MEDICINE

## 2023-10-31 PROCEDURE — 2700000000 HC OXYGEN THERAPY PER DAY

## 2023-10-31 PROCEDURE — 6360000002 HC RX W HCPCS: Performed by: INTERNAL MEDICINE

## 2023-10-31 PROCEDURE — 99291 CRITICAL CARE FIRST HOUR: CPT | Performed by: INTERNAL MEDICINE

## 2023-10-31 PROCEDURE — 6370000000 HC RX 637 (ALT 250 FOR IP): Performed by: NURSE PRACTITIONER

## 2023-10-31 PROCEDURE — 99024 POSTOP FOLLOW-UP VISIT: CPT | Performed by: PHYSICIAN ASSISTANT

## 2023-10-31 PROCEDURE — 2500000003 HC RX 250 WO HCPCS: Performed by: INTERNAL MEDICINE

## 2023-10-31 PROCEDURE — 6370000000 HC RX 637 (ALT 250 FOR IP): Performed by: EMERGENCY MEDICINE

## 2023-10-31 PROCEDURE — 94761 N-INVAS EAR/PLS OXIMETRY MLT: CPT

## 2023-10-31 PROCEDURE — 85027 COMPLETE CBC AUTOMATED: CPT

## 2023-10-31 PROCEDURE — 6360000002 HC RX W HCPCS: Performed by: EMERGENCY MEDICINE

## 2023-10-31 PROCEDURE — 6370000000 HC RX 637 (ALT 250 FOR IP): Performed by: INTERNAL MEDICINE

## 2023-10-31 PROCEDURE — 2580000003 HC RX 258: Performed by: NURSE PRACTITIONER

## 2023-10-31 RX ADMIN — ENOXAPARIN SODIUM 90 MG: 100 INJECTION SUBCUTANEOUS at 20:31

## 2023-10-31 RX ADMIN — LEVETIRACETAM 1000 MG: 100 INJECTION, SOLUTION INTRAVENOUS at 05:41

## 2023-10-31 RX ADMIN — ENOXAPARIN SODIUM 90 MG: 100 INJECTION SUBCUTANEOUS at 07:51

## 2023-10-31 RX ADMIN — FAMOTIDINE 20 MG: 20 TABLET ORAL at 20:27

## 2023-10-31 RX ADMIN — SODIUM CHLORIDE, PRESERVATIVE FREE 10 ML: 5 INJECTION INTRAVENOUS at 20:25

## 2023-10-31 RX ADMIN — SODIUM CHLORIDE, PRESERVATIVE FREE 10 ML: 5 INJECTION INTRAVENOUS at 07:50

## 2023-10-31 RX ADMIN — MODAFINIL 200 MG: 100 TABLET ORAL at 07:51

## 2023-10-31 RX ADMIN — CHLORHEXIDINE GLUCONATE 0.12% ORAL RINSE 15 ML: 1.2 LIQUID ORAL at 07:50

## 2023-10-31 RX ADMIN — PHENYTOIN SODIUM 150 MG: 50 INJECTION INTRAMUSCULAR; INTRAVENOUS at 20:32

## 2023-10-31 RX ADMIN — LEVETIRACETAM 1000 MG: 100 INJECTION, SOLUTION INTRAVENOUS at 18:08

## 2023-10-31 RX ADMIN — INSULIN GLARGINE 18 UNITS: 100 INJECTION, SOLUTION SUBCUTANEOUS at 20:31

## 2023-10-31 RX ADMIN — CHLORHEXIDINE GLUCONATE 0.12% ORAL RINSE 15 ML: 1.2 LIQUID ORAL at 20:30

## 2023-10-31 RX ADMIN — FAMOTIDINE 20 MG: 20 TABLET ORAL at 07:51

## 2023-10-31 RX ADMIN — MICONAZOLE NITRATE: 2 POWDER TOPICAL at 07:54

## 2023-10-31 RX ADMIN — BUMETANIDE 0.5 MG: 0.25 INJECTION INTRAMUSCULAR; INTRAVENOUS at 18:08

## 2023-10-31 RX ADMIN — MICONAZOLE NITRATE: 2 POWDER TOPICAL at 20:25

## 2023-10-31 RX ADMIN — PHENYTOIN SODIUM 150 MG: 50 INJECTION INTRAMUSCULAR; INTRAVENOUS at 07:52

## 2023-10-31 RX ADMIN — INSULIN LISPRO 4 UNITS: 100 INJECTION, SOLUTION INTRAVENOUS; SUBCUTANEOUS at 12:04

## 2023-10-31 RX ADMIN — PHENYTOIN SODIUM 50 MG: 50 INJECTION INTRAMUSCULAR; INTRAVENOUS at 07:50

## 2023-10-31 ASSESSMENT — PULMONARY FUNCTION TESTS
PIF_VALUE: 14
PIF_VALUE: 15
PIF_VALUE: 15

## 2023-10-31 NOTE — PLAN OF CARE
Problem: Discharge Planning  Goal: Discharge to home or other facility with appropriate resources  Outcome: Progressing  Flowsheets (Taken 10/31/2023 0055)  Discharge to home or other facility with appropriate resources: Identify barriers to discharge with patient and caregiver     Problem: Chronic Conditions and Co-morbidities  Goal: Patient's chronic conditions and co-morbidity symptoms are monitored and maintained or improved  Outcome: Progressing  Flowsheets (Taken 10/31/2023 0055)  Care Plan - Patient's Chronic Conditions and Co-Morbidity Symptoms are Monitored and Maintained or Improved: Monitor and assess patient's chronic conditions and comorbid symptoms for stability, deterioration, or improvement     Problem: Safety - Adult  Goal: Free from fall injury  Outcome: Progressing  Flowsheets (Taken 10/31/2023 0055)  Free From Fall Injury: Instruct family/caregiver on patient safety     Problem: Pain  Goal: Verbalizes/displays adequate comfort level or baseline comfort level  Outcome: Progressing  Flowsheets (Taken 10/31/2023 0055)  Verbalizes/displays adequate comfort level or baseline comfort level: Assess pain using appropriate pain scale     Problem: Respiratory - Adult  Goal: Achieves optimal ventilation and oxygenation  Outcome: Progressing  Flowsheets (Taken 10/31/2023 0055)  Achieves optimal ventilation and oxygenation: Assess for changes in respiratory status     Problem: Neurosensory - Adult  Goal: Achieves stable or improved neurological status  Outcome: Progressing  Flowsheets (Taken 10/31/2023 0055)  Achieves stable or improved neurological status: Assess for and report changes in neurological status  Goal: Absence of seizures  Outcome: Progressing  Flowsheets (Taken 10/31/2023 0055)  Absence of seizures: Monitor for seizure activity.   If seizure occurs, document type and location of movements and any associated apnea     Problem: Neurosensory - Adult  Goal: Absence of seizures  Outcome: relaxation techniques, as appropriate  5.  Assess for spiritual pain/suffering and initiate Spiritual Care, Psychosocial Clinical Specialist consults as needed  Outcome: Progressing  Flowsheets (Taken 10/31/2023 0055)  Patient/family able to verbalize anxieties, fears, and concerns, and demonstrate effective coping: Assist patient/family to identify coping skills, available support systems and cultural and spiritual values

## 2023-10-31 NOTE — PROGRESS NOTES
Pt was unresponsive but was blessed.     10/31/23 1403   Encounter Summary   Service Provided For: Patient   Referral/Consult From: [address] Family members   Last Encounter  10/31/23  (NR)   Complexity of Encounter Low   Begin Time 1055   End Time  1100   Total Time Calculated 5 min   Spiritual/Emotional needs   Type Spiritual Support   Assessment/Intervention/Outcome   Assessment Unable to assess

## 2023-10-31 NOTE — PLAN OF CARE
Problem: Respiratory - Adult  Goal: Normal spontaneous ventilation  10/31/2023 1812 by Gavin Armas RCP  Outcome: Not Progressing                                                 Patient Weaning Progress    The patient's vent settings were able to be weaned this shift. Ventilator settings that were weaned              [] Mode   [x] Pressure support weaned   [] Fio2 weaned   [] Peep weaned    The patient was able to tolerate SBT. The patient was on SBT for 12 hours. Cuff was not  deflated to determine cuff leak. Unable to get agreement for goals because no family is present and patient cannot respond.

## 2023-10-31 NOTE — CARE COORDINATION
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 There is also nonocclusive thrombus within the left posterior tibial vein  10/16 Extubated to bipap  10/16 Code Blue  10/16 Reintubated  10/16 Bronch: thick bloody secretions in RLL removed  10/16 CXR: Mild stable cardiomegaly with pulmonary vascular congestion suspicious for congestive heart failure; Prominent pulmonary interstitium suspicious for pulmonary edema  10/17 5 hr EEG: No seizures noted; diffuse background attenuation and suppression without evidence of reactivity; severe encephalopathy  10/17 CT Head: Stable CT appearance the brain. No acute findings  10/17 MRI Brain: No acute findings  32/72 PICC left basilic  81/80 CT Head: No acute findings  10/18 OR: ET Exchange bronhoscopy and lavage  10/19 4hr 40 min EEG: This EEG was abnormal. The background abnormalities indicated a moderate to severe encephalopathy, nonspecific to etiology. The generalized periodic discharges (GPDs) indicated underlying cortical irritability/increased risk of seizures, and can be seen in various encephalopathies. No seizures were recorded  10/19 MRI Brain: No evidence of an acute infarct. No evidence of anoxia; Stable mild severity chronic small vessel ischemic changes; Global volume loss  10/23 4-day EEG: Moderate to severe encephalopathy, improved as recording progressed.  Frequent and at times near continuous runs of GPDs with often typical and at times atypical

## 2023-10-31 NOTE — PLAN OF CARE
Problem: Respiratory - Adult  Goal: Normal spontaneous ventilation  Outcome: Progressing                                                Patient Weaning Progress    The patient's vent settings was able to be weaned this shift. Ventilator settings that were weaned              [x] Mode   [] Pressure support weaned   [] Fio2 weaned   [] Peep weaned      Spontaneous weaning trial  was attempted. due to defined parameters for SBT (spontaneous breathing trial) not being met. Reason that defined ventilator parameters for SBT was not met              [] Patient condition requires increased ventilator settings  [] Requires increased sedation   [] Settings not within weaning range   [] SAT not completed   [] Physician orders    The patient was able to tolerate SBT. RSBI  was 32 with EtCO2 of 38 and SpO2 of 93 on 30% FiO2. Spontanteous VT was 466 and RR 15 breaths/min. The patient was on SBT for 12+ hours    Evac tube was  hooked up with continuous low suction(20-30mmHg)      Cuff was not  deflated to determine cuff leak. Unable to get agreement for goals because no family is present and patient cannot respond.

## 2023-11-01 ENCOUNTER — APPOINTMENT (OUTPATIENT)
Dept: GENERAL RADIOLOGY | Age: 73
DRG: 163 | End: 2023-11-01
Attending: OTOLARYNGOLOGY
Payer: MEDICARE

## 2023-11-01 LAB
ANION GAP SERPL CALC-SCNC: 9 MEQ/L (ref 8–16)
BUN SERPL-MCNC: 26 MG/DL (ref 7–22)
CALCIUM SERPL-MCNC: 8.8 MG/DL (ref 8.5–10.5)
CHLORIDE SERPL-SCNC: 101 MEQ/L (ref 98–111)
CO2 SERPL-SCNC: 27 MEQ/L (ref 23–33)
CREAT SERPL-MCNC: 0.6 MG/DL (ref 0.4–1.2)
DEPRECATED RDW RBC AUTO: 54.8 FL (ref 35–45)
ERYTHROCYTE [DISTWIDTH] IN BLOOD BY AUTOMATED COUNT: 15.9 % (ref 11.5–14.5)
GFR SERPL CREATININE-BSD FRML MDRD: > 60 ML/MIN/1.73M2
GLUCOSE BLD STRIP.AUTO-MCNC: 123 MG/DL (ref 70–108)
GLUCOSE BLD STRIP.AUTO-MCNC: 133 MG/DL (ref 70–108)
GLUCOSE BLD STRIP.AUTO-MCNC: 162 MG/DL (ref 70–108)
GLUCOSE BLD STRIP.AUTO-MCNC: 176 MG/DL (ref 70–108)
GLUCOSE SERPL-MCNC: 133 MG/DL (ref 70–108)
HCT VFR BLD AUTO: 26.3 % (ref 37–47)
HGB BLD-MCNC: 8.3 GM/DL (ref 12–16)
MCH RBC QN AUTO: 30 PG (ref 26–33)
MCHC RBC AUTO-ENTMCNC: 31.6 GM/DL (ref 32.2–35.5)
MCV RBC AUTO: 94.9 FL (ref 81–99)
PHOSPHATE SERPL-MCNC: 4.3 MG/DL (ref 2.4–4.7)
PLATELET # BLD AUTO: 262 THOU/MM3 (ref 130–400)
PMV BLD AUTO: 9.9 FL (ref 9.4–12.4)
POTASSIUM SERPL-SCNC: 4 MEQ/L (ref 3.5–5.2)
RBC # BLD AUTO: 2.77 MILL/MM3 (ref 4.2–5.4)
SODIUM SERPL-SCNC: 137 MEQ/L (ref 135–145)
WBC # BLD AUTO: 7.5 THOU/MM3 (ref 4.8–10.8)

## 2023-11-01 PROCEDURE — 85027 COMPLETE CBC AUTOMATED: CPT

## 2023-11-01 PROCEDURE — 2000000000 HC ICU R&B

## 2023-11-01 PROCEDURE — 84100 ASSAY OF PHOSPHORUS: CPT

## 2023-11-01 PROCEDURE — 6370000000 HC RX 637 (ALT 250 FOR IP): Performed by: NURSE PRACTITIONER

## 2023-11-01 PROCEDURE — 6370000000 HC RX 637 (ALT 250 FOR IP): Performed by: INTERNAL MEDICINE

## 2023-11-01 PROCEDURE — 94003 VENT MGMT INPAT SUBQ DAY: CPT

## 2023-11-01 PROCEDURE — 80048 BASIC METABOLIC PNL TOTAL CA: CPT

## 2023-11-01 PROCEDURE — 99024 POSTOP FOLLOW-UP VISIT: CPT | Performed by: PHYSICIAN ASSISTANT

## 2023-11-01 PROCEDURE — 71045 X-RAY EXAM CHEST 1 VIEW: CPT

## 2023-11-01 PROCEDURE — 6360000002 HC RX W HCPCS: Performed by: EMERGENCY MEDICINE

## 2023-11-01 PROCEDURE — 2580000003 HC RX 258: Performed by: EMERGENCY MEDICINE

## 2023-11-01 PROCEDURE — 6360000002 HC RX W HCPCS: Performed by: INTERNAL MEDICINE

## 2023-11-01 PROCEDURE — 94761 N-INVAS EAR/PLS OXIMETRY MLT: CPT

## 2023-11-01 PROCEDURE — 6360000002 HC RX W HCPCS: Performed by: PHYSICIAN ASSISTANT

## 2023-11-01 PROCEDURE — 6370000000 HC RX 637 (ALT 250 FOR IP): Performed by: EMERGENCY MEDICINE

## 2023-11-01 PROCEDURE — 2700000000 HC OXYGEN THERAPY PER DAY

## 2023-11-01 PROCEDURE — 36415 COLL VENOUS BLD VENIPUNCTURE: CPT

## 2023-11-01 PROCEDURE — 2500000003 HC RX 250 WO HCPCS: Performed by: INTERNAL MEDICINE

## 2023-11-01 PROCEDURE — 2580000003 HC RX 258: Performed by: NURSE PRACTITIONER

## 2023-11-01 PROCEDURE — 82948 REAGENT STRIP/BLOOD GLUCOSE: CPT

## 2023-11-01 PROCEDURE — 99291 CRITICAL CARE FIRST HOUR: CPT | Performed by: INTERNAL MEDICINE

## 2023-11-01 RX ADMIN — SODIUM CHLORIDE, PRESERVATIVE FREE 10 ML: 5 INJECTION INTRAVENOUS at 07:56

## 2023-11-01 RX ADMIN — PHENYTOIN SODIUM 50 MG: 50 INJECTION INTRAMUSCULAR; INTRAVENOUS at 07:59

## 2023-11-01 RX ADMIN — BUMETANIDE 0.5 MG: 0.25 INJECTION INTRAMUSCULAR; INTRAVENOUS at 17:19

## 2023-11-01 RX ADMIN — ENOXAPARIN SODIUM 90 MG: 100 INJECTION SUBCUTANEOUS at 20:37

## 2023-11-01 RX ADMIN — FAMOTIDINE 20 MG: 20 TABLET ORAL at 20:37

## 2023-11-01 RX ADMIN — PHENYTOIN SODIUM 150 MG: 50 INJECTION INTRAMUSCULAR; INTRAVENOUS at 08:01

## 2023-11-01 RX ADMIN — MICONAZOLE NITRATE: 2 POWDER TOPICAL at 07:57

## 2023-11-01 RX ADMIN — MICONAZOLE NITRATE: 2 POWDER TOPICAL at 20:37

## 2023-11-01 RX ADMIN — LEVETIRACETAM 1000 MG: 100 INJECTION, SOLUTION INTRAVENOUS at 17:18

## 2023-11-01 RX ADMIN — CHLORHEXIDINE GLUCONATE 0.12% ORAL RINSE 15 ML: 1.2 LIQUID ORAL at 07:57

## 2023-11-01 RX ADMIN — FAMOTIDINE 20 MG: 20 TABLET ORAL at 07:57

## 2023-11-01 RX ADMIN — CHLORHEXIDINE GLUCONATE 0.12% ORAL RINSE 15 ML: 1.2 LIQUID ORAL at 21:55

## 2023-11-01 RX ADMIN — ENOXAPARIN SODIUM 90 MG: 100 INJECTION SUBCUTANEOUS at 07:57

## 2023-11-01 RX ADMIN — INSULIN LISPRO 4 UNITS: 100 INJECTION, SOLUTION INTRAVENOUS; SUBCUTANEOUS at 00:06

## 2023-11-01 RX ADMIN — LEVETIRACETAM 1000 MG: 100 INJECTION, SOLUTION INTRAVENOUS at 06:10

## 2023-11-01 RX ADMIN — MODAFINIL 200 MG: 100 TABLET ORAL at 07:57

## 2023-11-01 RX ADMIN — INSULIN GLARGINE 18 UNITS: 100 INJECTION, SOLUTION SUBCUTANEOUS at 20:37

## 2023-11-01 RX ADMIN — PHENYTOIN SODIUM 150 MG: 50 INJECTION INTRAMUSCULAR; INTRAVENOUS at 20:36

## 2023-11-01 ASSESSMENT — PULMONARY FUNCTION TESTS
PIF_VALUE: 13
PIF_VALUE: 13
PIF_VALUE: 14
PIF_VALUE: 13

## 2023-11-01 NOTE — PROGRESS NOTES
CRITICAL CARE PROGRESS NOTE      Patient:  Kanwal Becerril    Unit/Bed:4D-08/008-A  YOB: 1950  MRN: [id number]   PCP: Edd Saez MD  Date of Admission: 10/12/2023  Chief Complaint:- tracheal stenosis s/p resection     Assessment and Plan:      Anoxic Brain Injury 2/2 respiratory arrest and subsequent cardiac arrest following failed extubation 10/16  EKG: No seizures, findings consistent with encephalopathy (mod-severe). Pt has cough and gag, withdraws to pain but not following commands. MRI Brain 10/19: No evidence of anoxia, no evidence of acute infarct. Provigil 200mg to promote wakefulness  Phenytoin 50 mg IV QD (150 mg IV Q12 hrs also), Keppra 1000 mg IV QD - neurology managing. Hypoxic respiratory failure:  Patient is vent-dependent without sedation. SBT - no cuff leak,   s/p cricotracheal resection, laryngoplasty with flap and vocal fold lateralization stitch placement with Dr. Roula Catalan 10/12  HOB at 30 degrees with pillow under head for semi-flexed position per ENT  Left RACHEL drain:  12 cc serosanguinous drainage overnight. Right RACHEL drain removed by ENT 10/27  10/18: 5.0 ET tube exchanged for 7.0 ET tube by Dr. Roula Catalan   Bilateral DVTs, superficial:    Venous Duplex B/L Lower Extremity 10/12: thrombus in BL posterior tibial veins and right peroneal vein   Venous Duplex Upper Extremities 10/23: Left axillary, brachial, basilic and cephalic vein thrombus.  Right cephalic and antecubital thrombosis   Lovenox resumed 10/28  DM Type 2:  01/2023 HbA1c: 5.5   Patient on 18 units Lantus nightly   High dose SSI   Chronic diastolic CHF:  History   ECHO 1/2/23: EF 60-65%  Strict I/O   Monitor for fluid overload  Bumex 0.5 QD  Leukocytosis:   WBC: 7.5, stable   Patient has remained afebrile   CXR 10/28 unremarkable; repeat ordered  Urinalysis suggestive of fungal growth  HTN:   Amlodipine 5mg daily, held   Carvedilol 6.25mg BID, held   Lopressor 5mg q4hr PRN for SBP > 160  Anemia:

## 2023-11-01 NOTE — PLAN OF CARE
Problem: Respiratory - Adult  Goal: Achieves optimal ventilation and oxygenation  11/1/2023 0506 by Orlando Culver RCP  Outcome: Progressing                                                   Patient Weaning Progress    The patient's vent settings was able to be weaned this shift. Ventilator settings that were weaned              [x] Mode   [] Pressure support weaned   [x] Fio2 weaned   [] Peep weaned      Spontaneous weaning trial  was attempted. due to defined parameters for SBT (spontaneous breathing trial) not being met. Reason that defined ventilator parameters for SBT was not met              [] Patient condition requires increased ventilator settings  [] Requires increased sedation   [] Settings not within weaning range   [] SAT not completed   [] Physician orders    The patient was able to tolerate SBT. RSBI  was 31 with EtCO2 of 40 and SpO2 of 95 on 25% FiO2. Spontanteous VT was 471 and RR 17 breaths/min. The patient was on SBT for 24+ hours and continues to tolerate. Evac tube was not  hooked up with continuous low suction(20-30mmHg)      Cuff was  deflated to determine cuff leak. A leak  was not detected. Unable to get agreement for goals because no family is present and patient cannot respond.

## 2023-11-01 NOTE — PLAN OF CARE
Problem: Discharge Planning  Goal: Discharge to home or other facility with appropriate resources  11/1/2023 1039 by Lise Hampton RN  Outcome: Progressing  Flowsheets (Taken 11/1/2023 0800)  Discharge to home or other facility with appropriate resources:   Identify barriers to discharge with patient and caregiver   Arrange for needed discharge resources and transportation as appropriate   Identify discharge learning needs (meds, wound care, etc)   Refer to discharge planning if patient needs post-hospital services based on physician order or complex needs related to functional status, cognitive ability or social support system     Problem: Chronic Conditions and Co-morbidities  Goal: Patient's chronic conditions and co-morbidity symptoms are monitored and maintained or improved  11/1/2023 1039 by Lise Hampton RN  Outcome: Progressing  Flowsheets (Taken 11/1/2023 0800)  Care Plan - Patient's Chronic Conditions and Co-Morbidity Symptoms are Monitored and Maintained or Improved:   Monitor and assess patient's chronic conditions and comorbid symptoms for stability, deterioration, or improvement   Collaborate with multidisciplinary team to address chronic and comorbid conditions and prevent exacerbation or deterioration   Update acute care plan with appropriate goals if chronic or comorbid symptoms are exacerbated and prevent overall improvement and discharge     Problem: Pain  Goal: Verbalizes/displays adequate comfort level or baseline comfort level  11/1/2023 1039 by Lise Hampton RN  Outcome: Progressing  Flowsheets (Taken 11/1/2023 0800)  Verbalizes/displays adequate comfort level or baseline comfort level:   Encourage patient to monitor pain and request assistance   Assess pain using appropriate pain scale   Administer analgesics based on type and severity of pain and evaluate response   Implement non-pharmacological measures as appropriate and evaluate response   Notify Licensed Chalo Zafar, RN  Outcome: Progressing  Flowsheets (Taken 11/1/2023 0800)  Maintains adequate nutritional intake:   Identify factors contributing to decreased intake, treat as appropriate   Monitor intake and output, weight and lab values   Obtain nutritional consult as needed     Problem: Cardiovascular - Adult  Goal: Maintains optimal cardiac output and hemodynamic stability  11/1/2023 1039 by Charito Plascencia, RN  Outcome: Progressing  Flowsheets (Taken 11/1/2023 0800)  Maintains optimal cardiac output and hemodynamic stability: Monitor blood pressure and heart rate     Care plan reviewed with patient and .  verbalize understanding of the plan of care and contribute to goal setting.

## 2023-11-01 NOTE — PLAN OF CARE
Problem: Discharge Planning  Goal: Discharge to home or other facility with appropriate resources  Outcome: Progressing  Flowsheets  Taken 10/31/2023 2157 by Cailin Salcedo RN  Discharge to home or other facility with appropriate resources: Identify barriers to discharge with patient and caregiver  Taken 10/31/2023 0800 by Robert Rodriguez RN  Discharge to home or other facility with appropriate resources:   Identify barriers to discharge with patient and caregiver   Arrange for needed discharge resources and transportation as appropriate   Identify discharge learning needs (meds, wound care, etc)   Refer to discharge planning if patient needs post-hospital services based on physician order or complex needs related to functional status, cognitive ability or social support system     Problem: Safety - Adult  Goal: Free from fall injury  Outcome: Progressing  Flowsheets (Taken 10/31/2023 2157)  Free From Fall Injury: Instruct family/caregiver on patient safety     Problem: Pain  Goal: Verbalizes/displays adequate comfort level or baseline comfort level  Outcome: Progressing  Flowsheets (Taken 10/31/2023 2157)  Verbalizes/displays adequate comfort level or baseline comfort level: Assess pain using appropriate pain scale     Problem: Respiratory - Adult  Goal: Normal spontaneous ventilation  10/31/2023 1812 by Kodak Day RCP  Outcome: Not Progressing  Goal: Achieves optimal ventilation and oxygenation  10/31/2023 2157 by Cailin Salcedo RN  Outcome: Progressing  Flowsheets (Taken 10/31/2023 2157)  Achieves optimal ventilation and oxygenation: Assess for changes in respiratory status  10/31/2023 1812 by Kodak Day RCP  Outcome: Progressing  Flowsheets (Taken 10/31/2023 0800 by Robert Rodriguez RN)  Achieves optimal ventilation and oxygenation:   Assess for changes in respiratory status   Assess for changes in mentation and behavior   Position to facilitate oxygenation and minimize respiratory Problem: Respiratory - Adult  Goal: Normal spontaneous ventilation  10/31/2023 1812 by Zulay Lester RCP  Outcome: Not Progressing

## 2023-11-01 NOTE — PLAN OF CARE
Problem: Respiratory - Adult  Goal: Achieves optimal ventilation and oxygenation  11/1/2023 1711 by Jai Contreras RCP  Flowsheets (Taken 11/1/2023 1711)  Achieves optimal ventilation and oxygenation:   Assess for changes in respiratory status   Position to facilitate oxygenation and minimize respiratory effort   Assess and instruct to report shortness of breath or any respiratory difficulty   Respiratory therapy support as indicated

## 2023-11-02 ENCOUNTER — APPOINTMENT (OUTPATIENT)
Dept: INTERVENTIONAL RADIOLOGY/VASCULAR | Age: 73
DRG: 163 | End: 2023-11-02
Attending: OTOLARYNGOLOGY
Payer: MEDICARE

## 2023-11-02 LAB
ALBUMIN SERPL BCG-MCNC: 3.1 G/DL (ref 3.5–5.1)
ALP SERPL-CCNC: 168 U/L (ref 38–126)
ALT SERPL W/O P-5'-P-CCNC: 18 U/L (ref 11–66)
ANION GAP SERPL CALC-SCNC: 12 MEQ/L (ref 8–16)
AST SERPL-CCNC: 46 U/L (ref 5–40)
BASOPHILS ABSOLUTE: 0 THOU/MM3 (ref 0–0.1)
BASOPHILS NFR BLD AUTO: 0.3 %
BILIRUB CONJ SERPL-MCNC: < 0.2 MG/DL (ref 0–0.3)
BILIRUB SERPL-MCNC: < 0.2 MG/DL (ref 0.3–1.2)
BUN SERPL-MCNC: 26 MG/DL (ref 7–22)
CA-I BLD ISE-SCNC: 1.19 MMOL/L (ref 1.12–1.32)
CALCIUM SERPL-MCNC: 8.7 MG/DL (ref 8.5–10.5)
CHLORIDE SERPL-SCNC: 100 MEQ/L (ref 98–111)
CO2 SERPL-SCNC: 26 MEQ/L (ref 23–33)
CREAT SERPL-MCNC: 0.6 MG/DL (ref 0.4–1.2)
DEPRECATED RDW RBC AUTO: 57.1 FL (ref 35–45)
EOSINOPHIL NFR BLD AUTO: 5.4 %
EOSINOPHILS ABSOLUTE: 0.3 THOU/MM3 (ref 0–0.4)
ERYTHROCYTE [DISTWIDTH] IN BLOOD BY AUTOMATED COUNT: 15.9 % (ref 11.5–14.5)
GFR SERPL CREATININE-BSD FRML MDRD: > 60 ML/MIN/1.73M2
GLUCOSE BLD STRIP.AUTO-MCNC: 159 MG/DL (ref 70–108)
GLUCOSE BLD STRIP.AUTO-MCNC: 178 MG/DL (ref 70–108)
GLUCOSE BLD STRIP.AUTO-MCNC: 191 MG/DL (ref 70–108)
GLUCOSE BLD STRIP.AUTO-MCNC: 211 MG/DL (ref 70–108)
GLUCOSE SERPL-MCNC: 150 MG/DL (ref 70–108)
HCT VFR BLD AUTO: 27.2 % (ref 37–47)
HGB BLD-MCNC: 8.5 GM/DL (ref 12–16)
IMM GRANULOCYTES # BLD AUTO: 0.13 THOU/MM3 (ref 0–0.07)
IMM GRANULOCYTES NFR BLD AUTO: 2.2 %
LYMPHOCYTES ABSOLUTE: 1.4 THOU/MM3 (ref 1–4.8)
LYMPHOCYTES NFR BLD AUTO: 24 %
MAGNESIUM SERPL-MCNC: 2 MG/DL (ref 1.6–2.4)
MCH RBC QN AUTO: 30.4 PG (ref 26–33)
MCHC RBC AUTO-ENTMCNC: 31.3 GM/DL (ref 32.2–35.5)
MCV RBC AUTO: 97.1 FL (ref 81–99)
MONOCYTES ABSOLUTE: 0.6 THOU/MM3 (ref 0.4–1.3)
MONOCYTES NFR BLD AUTO: 9.5 %
NEUTROPHILS NFR BLD AUTO: 58.6 %
NRBC BLD AUTO-RTO: 0 /100 WBC
PHOSPHATE SERPL-MCNC: 4.4 MG/DL (ref 2.4–4.7)
PLATELET # BLD AUTO: 258 THOU/MM3 (ref 130–400)
PMV BLD AUTO: 9.8 FL (ref 9.4–12.4)
POTASSIUM SERPL-SCNC: 4.2 MEQ/L (ref 3.5–5.2)
PROT SERPL-MCNC: 5.5 G/DL (ref 6.1–8)
RBC # BLD AUTO: 2.8 MILL/MM3 (ref 4.2–5.4)
SEGMENTED NEUTROPHILS ABSOLUTE COUNT: 3.5 THOU/MM3 (ref 1.8–7.7)
SODIUM SERPL-SCNC: 138 MEQ/L (ref 135–145)
WBC # BLD AUTO: 5.9 THOU/MM3 (ref 4.8–10.8)

## 2023-11-02 PROCEDURE — 2580000003 HC RX 258: Performed by: EMERGENCY MEDICINE

## 2023-11-02 PROCEDURE — 84100 ASSAY OF PHOSPHORUS: CPT

## 2023-11-02 PROCEDURE — 6360000002 HC RX W HCPCS: Performed by: EMERGENCY MEDICINE

## 2023-11-02 PROCEDURE — 6360000002 HC RX W HCPCS: Performed by: INTERNAL MEDICINE

## 2023-11-02 PROCEDURE — 36415 COLL VENOUS BLD VENIPUNCTURE: CPT

## 2023-11-02 PROCEDURE — 94003 VENT MGMT INPAT SUBQ DAY: CPT

## 2023-11-02 PROCEDURE — 94761 N-INVAS EAR/PLS OXIMETRY MLT: CPT

## 2023-11-02 PROCEDURE — 6370000000 HC RX 637 (ALT 250 FOR IP): Performed by: NURSE PRACTITIONER

## 2023-11-02 PROCEDURE — 80053 COMPREHEN METABOLIC PANEL: CPT

## 2023-11-02 PROCEDURE — 93970 EXTREMITY STUDY: CPT

## 2023-11-02 PROCEDURE — 83735 ASSAY OF MAGNESIUM: CPT

## 2023-11-02 PROCEDURE — 6360000002 HC RX W HCPCS: Performed by: PHYSICIAN ASSISTANT

## 2023-11-02 PROCEDURE — 2580000003 HC RX 258: Performed by: NURSE PRACTITIONER

## 2023-11-02 PROCEDURE — 85025 COMPLETE CBC W/AUTO DIFF WBC: CPT

## 2023-11-02 PROCEDURE — 99024 POSTOP FOLLOW-UP VISIT: CPT | Performed by: PHYSICIAN ASSISTANT

## 2023-11-02 PROCEDURE — 2000000000 HC ICU R&B

## 2023-11-02 PROCEDURE — 82948 REAGENT STRIP/BLOOD GLUCOSE: CPT

## 2023-11-02 PROCEDURE — 99291 CRITICAL CARE FIRST HOUR: CPT | Performed by: INTERNAL MEDICINE

## 2023-11-02 PROCEDURE — 2500000003 HC RX 250 WO HCPCS: Performed by: INTERNAL MEDICINE

## 2023-11-02 PROCEDURE — 6370000000 HC RX 637 (ALT 250 FOR IP): Performed by: INTERNAL MEDICINE

## 2023-11-02 PROCEDURE — 82330 ASSAY OF CALCIUM: CPT

## 2023-11-02 PROCEDURE — 6370000000 HC RX 637 (ALT 250 FOR IP): Performed by: EMERGENCY MEDICINE

## 2023-11-02 PROCEDURE — 82248 BILIRUBIN DIRECT: CPT

## 2023-11-02 RX ADMIN — LEVETIRACETAM 1000 MG: 100 INJECTION, SOLUTION INTRAVENOUS at 17:21

## 2023-11-02 RX ADMIN — ENOXAPARIN SODIUM 90 MG: 100 INJECTION SUBCUTANEOUS at 21:46

## 2023-11-02 RX ADMIN — CHLORHEXIDINE GLUCONATE 0.12% ORAL RINSE 15 ML: 1.2 LIQUID ORAL at 21:47

## 2023-11-02 RX ADMIN — MICONAZOLE NITRATE: 2 POWDER TOPICAL at 08:19

## 2023-11-02 RX ADMIN — INSULIN GLARGINE 18 UNITS: 100 INJECTION, SOLUTION SUBCUTANEOUS at 21:46

## 2023-11-02 RX ADMIN — FAMOTIDINE 20 MG: 20 TABLET ORAL at 21:46

## 2023-11-02 RX ADMIN — PHENYTOIN SODIUM 150 MG: 50 INJECTION INTRAMUSCULAR; INTRAVENOUS at 21:47

## 2023-11-02 RX ADMIN — ENOXAPARIN SODIUM 90 MG: 100 INJECTION SUBCUTANEOUS at 08:17

## 2023-11-02 RX ADMIN — INSULIN LISPRO 4 UNITS: 100 INJECTION, SOLUTION INTRAVENOUS; SUBCUTANEOUS at 11:58

## 2023-11-02 RX ADMIN — SODIUM CHLORIDE, PRESERVATIVE FREE 10 ML: 5 INJECTION INTRAVENOUS at 08:18

## 2023-11-02 RX ADMIN — MODAFINIL 200 MG: 100 TABLET ORAL at 08:18

## 2023-11-02 RX ADMIN — PHENYTOIN SODIUM 50 MG: 50 INJECTION INTRAMUSCULAR; INTRAVENOUS at 08:14

## 2023-11-02 RX ADMIN — CHLORHEXIDINE GLUCONATE 0.12% ORAL RINSE 15 ML: 1.2 LIQUID ORAL at 08:26

## 2023-11-02 RX ADMIN — LEVETIRACETAM 1000 MG: 100 INJECTION, SOLUTION INTRAVENOUS at 05:23

## 2023-11-02 RX ADMIN — SODIUM CHLORIDE, PRESERVATIVE FREE 10 ML: 5 INJECTION INTRAVENOUS at 21:47

## 2023-11-02 RX ADMIN — PHENYTOIN SODIUM 150 MG: 50 INJECTION INTRAMUSCULAR; INTRAVENOUS at 08:14

## 2023-11-02 RX ADMIN — BUMETANIDE 0.5 MG: 0.25 INJECTION INTRAMUSCULAR; INTRAVENOUS at 16:55

## 2023-11-02 RX ADMIN — MICONAZOLE NITRATE: 2 POWDER TOPICAL at 21:51

## 2023-11-02 RX ADMIN — FAMOTIDINE 20 MG: 20 TABLET ORAL at 08:17

## 2023-11-02 ASSESSMENT — PULMONARY FUNCTION TESTS
PIF_VALUE: 14
PIF_VALUE: 13
PIF_VALUE: 14

## 2023-11-02 NOTE — PLAN OF CARE
Problem: Discharge Planning  Goal: Discharge to home or other facility with appropriate resources  11/1/2023 2214 by Melinda Carrillo RN  Flowsheets (Taken 11/1/2023 0800 by Everett Back RN)  Discharge to home or other facility with appropriate resources:   Identify barriers to discharge with patient and caregiver   Arrange for needed discharge resources and transportation as appropriate   Identify discharge learning needs (meds, wound care, etc)   Refer to discharge planning if patient needs post-hospital services based on physician order or complex needs related to functional status, cognitive ability or social support system  Note: Pt not appropriate for discharge at this time, will continue to monitor and reassess.      11/1/2023 1039 by Everett Back RN  Outcome: Progressing  Flowsheets (Taken 11/1/2023 0800)  Discharge to home or other facility with appropriate resources:   Identify barriers to discharge with patient and caregiver   Arrange for needed discharge resources and transportation as appropriate   Identify discharge learning needs (meds, wound care, etc)   Refer to discharge planning if patient needs post-hospital services based on physician order or complex needs related to functional status, cognitive ability or social support system     Problem: Chronic Conditions and Co-morbidities  Goal: Patient's chronic conditions and co-morbidity symptoms are monitored and maintained or improved  11/1/2023 2214 by Melinda Carrillo RN  Outcome: Progressing  Flowsheets (Taken 11/1/2023 0800 by Everett Back RN)  Care Plan - Patient's Chronic Conditions and Co-Morbidity Symptoms are Monitored and Maintained or Improved:   Monitor and assess patient's chronic conditions and comorbid symptoms for stability, deterioration, or improvement   Collaborate with multidisciplinary team to address chronic and comorbid conditions and prevent exacerbation or deterioration   Update acute care plan with support systems and cultural and spiritual values   Provide emotional support, including active listening and acknowledgement of concerns of patient and caregivers   Reduce environmental stimuli, as able   Instruct patient/family in relaxation techniques, as appropriate  Note: No family present at this time for discussion.      Problem: Cardiovascular - Adult  Goal: Maintains optimal cardiac output and hemodynamic stability  11/1/2023 2214 by Catalina Freire RN  Outcome: Progressing  Flowsheets (Taken 11/1/2023 0800 by Maverick Hargrove RN)  Maintains optimal cardiac output and hemodynamic stability: Monitor blood pressure and heart rate  11/1/2023 1039 by Maverick Hargrove RN  Outcome: Progressing  Flowsheets (Taken 11/1/2023 0800)  Maintains optimal cardiac output and hemodynamic stability: Monitor blood pressure and heart rate

## 2023-11-02 NOTE — PLAN OF CARE
Problem: Respiratory - Adult  Goal: Normal spontaneous ventilation  Outcome: Progressing                                                Patient Weaning Progress    The patient's vent settings was able to be weaned this shift. Ventilator settings that were weaned              [x] Mode   [x] Pressure support weaned   [] Fio2 weaned   [] Peep weaned      Spontaneous weaning trial  was attempted. due to defined parameters for SBT (spontaneous breathing trial) not being met. *Results of SBT documented under SBTOUTCOME note. The patient was able to tolerate SBT. RSBI  was 35 with EtCO2 of 42 and SpO2 of 97 on 25% FiO2. Spontanteous VT was 525 and RR 20 breaths/min. The patient was on SBT for 73 hours       Family mutually agreed on goals.

## 2023-11-02 NOTE — PLAN OF CARE
Problem: Respiratory - Adult  Goal: Achieves optimal ventilation and oxygenation  11/2/2023 0545 by Sadaf Aparicio RCP  Outcome: Progressing  Flowsheets (Taken 11/2/2023 0545)  Achieves optimal ventilation and oxygenation:   Assess for changes in respiratory status   Position to facilitate oxygenation and minimize respiratory effort   Assess the need for suctioning and aspirate as needed   Respiratory therapy support as indicated   Assess for changes in mentation and behavior   Oxygen supplementation based on oxygen saturation or arterial blood gases   Encourage broncho-pulmonary hygiene including cough, deep breathe, incentive spirometry   Assess and instruct to report shortness of breath or any respiratory difficulty  Note: Intubated/Vented. Wean as tolerated. SBT when appropriate. Patient Weaning Progress    The patient's vent settings was able to be weaned this shift. Ventilator settings that were weaned              [x] Mode   [] Pressure support weaned   [] Fio2 weaned   [] Peep weaned      Spontaneous weaning trial  was attempted. The patient was able to tolerate SBT. RSBI  was 32 with EtCO2 of 44 and SpO2 of 96 on 25% FiO2. Spontanteous VT was 467 and RR 15 breaths/min. Cuff was not  deflated to determine cuff leak. Unable to get agreement for goals because no family is present and patient cannot respond.

## 2023-11-02 NOTE — PLAN OF CARE
Problem: Discharge Planning  Goal: Discharge to home or other facility with appropriate resources  11/2/2023 0932 by Rosalee Beebe RN  Outcome: Progressing  Flowsheets (Taken 11/2/2023 0932)  Discharge to home or other facility with appropriate resources:   Arrange for needed discharge resources and transportation as appropriate   Identify discharge learning needs (meds, wound care, etc)   Identify barriers to discharge with patient and caregiver     Problem: Chronic Conditions and Co-morbidities  Goal: Patient's chronic conditions and co-morbidity symptoms are monitored and maintained or improved  11/2/2023 0932 by Rosalee Beebe RN  Outcome: Progressing  Flowsheets (Taken 11/2/2023 0932)  Care Plan - Patient's Chronic Conditions and Co-Morbidity Symptoms are Monitored and Maintained or Improved:   Monitor and assess patient's chronic conditions and comorbid symptoms for stability, deterioration, or improvement   Update acute care plan with appropriate goals if chronic or comorbid symptoms are exacerbated and prevent overall improvement and discharge   Collaborate with multidisciplinary team to address chronic and comorbid conditions and prevent exacerbation or deterioration     Problem: Safety - Adult  Goal: Free from fall injury  11/2/2023 0932 by Rosalee Beebe RN  Outcome: Progressing  Flowsheets (Taken 11/2/2023 0932)  Free From Fall Injury:   Instruct family/caregiver on patient safety   Based on caregiver fall risk screen, instruct family/caregiver to ask for assistance with transferring infant if caregiver noted to have fall risk factors     Problem: Pain  Goal: Verbalizes/displays adequate comfort level or baseline comfort level  11/2/2023 0932 by Rosalee Beebe RN  Outcome: Progressing  Flowsheets (Taken 11/2/2023 0932)  Verbalizes/displays adequate comfort level or baseline comfort level:   Assess pain using appropriate pain scale   Encourage patient to monitor pain and request assistance   Administer analgesics based on type and severity of pain and evaluate response     Problem: Respiratory - Adult  Goal: Achieves optimal ventilation and oxygenation  11/2/2023 0932 by Oliver Henson RN  Outcome: Progressing  Flowsheets (Taken 11/2/2023 0932)  Achieves optimal ventilation and oxygenation:   Assess for changes in respiratory status   Assess for changes in mentation and behavior   Position to facilitate oxygenation and minimize respiratory effort     Problem: Neurosensory - Adult  Goal: Achieves stable or improved neurological status  11/2/2023 0932 by Oliver Henson RN  Outcome: Progressing  Flowsheets (Taken 11/2/2023 0932)  Achieves stable or improved neurological status:   Assess for and report changes in neurological status   Initiate measures to prevent increased intracranial pressure   Maintain blood pressure and fluid volume within ordered parameters to optimize cerebral perfusion and minimize risk of hemorrhage     Problem: Neurosensory - Adult  Goal: Absence of seizures  11/2/2023 0932 by Oliver Henson RN  Outcome: Progressing  Flowsheets (Taken 11/2/2023 0932)  Absence of seizures:   If seizure occurs, turn head to side and suction secretions as needed   Monitor for seizure activity.   If seizure occurs, document type and location of movements and any associated apnea   Diagnostic studies as ordered   Support airway/breathing, administer oxygen as needed     Problem: Skin/Tissue Integrity - Adult  Goal: Incisions, wounds, or drain sites healing without S/S of infection  11/2/2023 0932 by Oliver Henson RN  Outcome: Progressing  Flowsheets  Taken 11/2/2023 0932 by Oliver Henson RN  Incisions, Wounds, or Drain Sites Healing Without Sign and Symptoms of Infection:   TWICE DAILY: Assess and document skin integrity   Implement wound care per orders   Initiate isolation precautions as appropriate    Problem: Musculoskeletal - Adult  Goal:

## 2023-11-02 NOTE — PROGRESS NOTES
CRITICAL CARE PROGRESS NOTE      Patient:  Adonay Ortiz    Unit/Bed:4D-08/008-A  YOB: 1950  MRN: [id number]   PCP: Amish Zabala MD  Date of Admission: 10/12/2023  Chief Complaint:- tracheal stenosis s/p resection     Assessment and Plan:      Anoxic Brain Injury 2/2 respiratory arrest and subsequent cardiac arrest following failed extubation 10/16  EKG: No seizures, findings consistent with encephalopathy (mod-severe). Pt has cough and gag, extends to pain but not following commands; positive Babinski sign. MRI Brain 10/19: No evidence of anoxia, no evidence of acute infarct. Provigil 200mg to promote wakefulness  Phenytoin 50 mg IV QD (150 mg IV Q12 hrs also), Keppra 1000 mg IV QD - neurology managing. Continue to monitor. Hypoxic respiratory failure:  Patient is vent-dependent without sedation. SBT - no cuff leak, barrier to extubation = GCS 4  s/p cricotracheal resection, laryngoplasty with flap and vocal fold lateralization stitch placement with Dr. Ayanna Tuttle 10/12  HOB at 30 degrees with pillow under head for semi-flexed position per ENT  Left RACHEL drain:  5 cc serosanguinous drainage overnight. Right RACHEL drain removed by ENT 10/27  10/18: 5.0 ET tube exchanged for 7.0 ET tube by Dr. Ayanna Tuttle     Bilateral DVTs, superficial:    Venous Duplex B/L Lower Extremity 10/12: thrombus in BL posterior tibial veins and right peroneal vein   Venous Duplex Upper Extremities 10/23: Left axillary, brachial, basilic and cephalic vein thrombus. Right cephalic and antecubital thrombosis   Lovenox resumed 10/28  Repeat V Dup B/L UE ordered for this morning. Follow results. DM Type 2:  01/2023 HbA1c: 5.5   Patient on 18 units Lantus nightly   High dose SSI     Chronic diastolic CHF:  History   ECHO 1/2/23: EF 60-65%  Strict I/O   Monitor for fluid overload  Bumex 0.5 QD    Leukocytosis:   Peak 17.2 on 10/24. Resolved.    WBCs stable   Patient has remained afebrile     Anemia: s/p 1 U HEENT:  normocephalic and atraumatic. No scleral icterus. PERR  Neck: supple. No Thyromegaly. Lungs: mechanical ventilation, no wheezing or rhonchi heard   Cardiac: RRR. Abdomen: soft. Extremities:  2+ edema x 4. Vasculature: capillary refill < 3 seconds. Palpable dorsalis pedis pulses. Skin:  dry, pale  Neuro: extension to pain, positive babinski     Data: (All radiographs, tracings, PFTs, and imaging are personally viewed and interpreted unless otherwise noted). Sodium 138 potassium  4.2, ical 1.19, phos 4.4  WBCs and Hgb stable  Telemetry shows NSR    Seen with multidisciplinary ICU team yes. Meets Continued ICU Level Care Criteria:    [x] Yes   [] No - Transfer Planned to listed location:  [] HOSPITALIST CONTACTED-      Case and plan discussed with Dr. Ruben Rosa. Electronically signed by Tomi Munoz MD  Patient seen by me including key components of medical care. Case discussed with resident physician. Continue with ventilator support while patient recovers from anoxic michael injury. Thus far, slow progress and recovery has occurred. .  Italicized font, if present,  represents changes to the note made by me. CC time 35 minutes. Time was discontiguous. Time does not include procedure. Time does include my direct assessment of the patient and coordination of care. Time represents more than 50% of the time involved with patient care by the [address] [place] [address] team.  Electronically signed by Michelle Nj.  Ruben Rosa MD.

## 2023-11-02 NOTE — PROGRESS NOTES
Comprehensive Nutrition Assessment    Type and Reason for Visit:  Reassess (Tube Feed Monitor)    Nutrition Recommendations/Plan:   Continue current tube feed regimen: Vital 1.2 bolus 215 ml every 4 hours to better meet nutritional needs. Free water flush 30 ml before and after each bolus (or per provider). Recommend consider start bowl medications d/t no bowel movement documented in 6 days per EMR. Malnutrition Assessment:  Malnutrition Status:  Insufficient data (10/13/23 1128)    Context:  Chronic Illness     Findings of the 6 clinical characteristics of malnutrition:  Energy Intake:  Unable to assess (pt. intubated)  Weight Loss:  Greater than 10% over 6 months (31# or 16% in 5 months)     Body Fat Loss:  Unable to assess (facial edema; RN asked not to disturb pt. this am)     Muscle Mass Loss:  No significant muscle mass loss Temples (temporalis), Clavicles (pectoralis & deltoids)  Fluid Accumulation:  Mild     Strength:  Not Performed    Nutrition Assessment: Pt improving from a nutritional standpoint AEB pt NPO, intubated and is tolerating Vital 1.2 bolus tube feed at goal. 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 Nutrition Related Findings:    Pt. Report/Treatments/Miscellaneous: Pt seen- intubated, RN reports pt is tolerating Vital 1.2 tube feed bolus of 215 ml every 4 hours flushing 30 ml free water before and after each bolus. GI Status: last BM x1 on 10/27  Pertinent Labs: Sodium 138, Potassium 4.2, BUN 26, Cr. 0.6, Glucose 150. Pertinent Meds: Bumex, Pepcid, Lantus, Humalog, Keppra, Dilantin.    Wound Type: Pressure Injury, Stage II (coccyx; stage 1 buttocks; skin tear abdomen; 10/12 ENT surgery: Cricotracheal Resection, Laryngoplasty with Flap and Vocal Fold Lateralization Stitch Placement)       Current Nutrition Intake & Therapies:    Average Meal Intake: NPO  Average Supplements Intake: NPO  Diet NPO Exceptions are: Sips of Water with Meds  ADULT TUBE FEEDING; Nasogastric; Peptide Based; Bolus; 6 Times Daily; 215; Gravity; 30; Before and after each bolus  Current Tube Feeding (TF) Orders:  Feeding Route: Nasogastric (with bridle device)  Formula: Peptide Based (started 10/13)  Schedule: Bolus  Feeding Regimen: Vital 1.2 bolus, 215 mls every 4 hours  Additives/Modulars: None  Water Flushes: 30ml before and after each bolus  Current TF & Flush Orders Provides: Vital 1.2 bolus 215 ml every 4 hours = 1548 kcals, 97 gm protein, 143 gm CHO, 7 gm fiber, 1406 ml free water (1046 TF, 360 flushes) in 1650 ml total volume (1290 TF, 360 flushes)/24 hours  Goal TF & Flush Orders Provides: Vital 1.2 bolus 215 ml every 4 hours = 1548 kcals, 97 gm protein, 143 gm CHO, 7 gm fiber, 1406 ml free water (1046 TF, 360 flushes) in 1650 ml total volume (1290 TF, 360 flushes)/24 hours    Anthropometric Measures:  Height: 157.5 cm (5' 2\")  Ideal Body Weight (IBW): 110 lbs (50 kg)    Admission Body Weight: 76.2 kg (168 lb) (10/12 with facial edema)  Current Body Weight: 91.2 kg (201 lb 1 oz) (11/2; +2 pitting BLE; +3 pitting BUE), 182.8 % IBW.  Weight Source: Bed Scale  Current BMI (kg/m2): 36.8  Usual Body Weight: 90.3 kg (199 lb) (5/11/23; 206# 2/28/23)  % Weight Change (Calculated): -15.6  BMI Categories: Obese Class 2 (BMI 35.0 -39.9)    Estimated Daily Nutrient Needs:  Energy Requirements Based On: Kcal/kg  Weight Used for Energy Requirements: Current (62.5 kg)  Energy (kcal/day): 1741-6113 kcal/day (20-25 kcal/kg)  Weight Used for Protein Requirements: Ideal (50kgm)  Protein (g/day):  g/day (1.4-2 g/kg)  Method Used for Fluid Requirements: Other (Comment)  Fluid (ml/day): per Physician    Nutrition Diagnosis:

## 2023-11-02 NOTE — PROGRESS NOTES
Pt was unresponsive but was blessed.     11/02/23 1446   Encounter Summary   Service Provided For: Patient   Referral/Consult From: [facility]   Last Encounter  11/02/23   Complexity of Encounter Low   Begin Time 1040   End Time  1045   Total Time Calculated 5 min   Spiritual/Emotional needs   Type Spiritual Support   Assessment/Intervention/Outcome   Assessment Unable to assess

## 2023-11-02 NOTE — PROGRESS NOTES
Ventilator spontaneous breathing trial outcome:                [x] Tolerated SBT and vitals are stable   [] Order to extubate entered by provider    [] SBT not tolerated    [] Respiratory distress    [] Decreased LOC    [] Vitals unstable    [] Agitation  [] Provider request        Patient not responsive yet and  patient is tolerating SBT

## 2023-11-02 NOTE — CARE COORDINATION
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 There is also nonocclusive thrombus within the left posterior tibial vein  10/16 Extubated to bipap  10/16 Code Blue  10/16 Reintubated  10/16 Bronch: thick bloody secretions in RLL removed  10/16 CXR: Mild stable cardiomegaly with pulmonary vascular congestion suspicious for congestive heart failure; Prominent pulmonary interstitium suspicious for pulmonary edema  10/17 5 hr EEG: No seizures noted; diffuse background attenuation and suppression without evidence of reactivity; severe encephalopathy  10/17 CT Head: Stable CT appearance the brain. No acute findings  10/17 MRI Brain: No acute findings  49/32 PICC left basilic  07/50 CT Head: No acute findings  10/18 OR: ET Exchange bronhoscopy and lavage  10/19 4hr 40 min EEG: This EEG was abnormal. The background abnormalities indicated a moderate to severe encephalopathy, nonspecific to etiology. The generalized periodic discharges (GPDs) indicated underlying cortical irritability/increased risk of seizures, and can be seen in various encephalopathies. No seizures were recorded  10/19 MRI Brain: No evidence of an acute infarct. No evidence of anoxia; Stable mild severity chronic small vessel ischemic changes; Global volume loss  10/23 4-day EEG: Moderate to severe encephalopathy, improved as recording progressed.  Frequent and at times near continuous runs of GPDs with often typical and at times atypical triphasic morphology, activated with stimulation at the beginning of recording, consistent with SIRPIDs   10/23 BUE Venous Dopplers: There is noncompressibility and absent flow, likely secondary to acute thrombus, in the left axillary, brachial, basilic and cephalic veins. There is partial flow and compressibility in the left subclavian vein. There is noncompressibility and absent flow in the right cephalic and antecubital veins. Remaining vein segments demonstrate normal compressibility and flow  11/1 CXR: No interval change since previous study dated 10/28/2023. Delorise Pillar Barriers to Discharge: BUE venous dopplers ordered. Tolerating SBT. Still not awake. Remains on vent w/ETT on SBT, FIO2 25%, sats 98%. Tmax 99.1. NSR. Unable to follow commands, CORTEZ x4 to painful stim. RACHEL x1. Intensivist, Neurology, and ENT following. Dietitian and Wound Care following. Telemetry, vanesa, PICC, NG w/bolus TF, drain care, werner, SCDs. IV bumex 0.5 mg daily, Peridex, lovenox, pepcid, lantus, SSI, IV keppra, desenex, provigil, IV dilantin, prn fibercon, scopolamine patch, Electrolyte replacement protocols. Alb 3.1, alk phos 168, ast 46, total pro 5.5, hgb 8.5. PCP: Jessy Zhao MD  Readmission Risk Score: 20.8%  Patient Goals/Plan/Treatment Preferences: From home w/. Plan pending clinical course.

## 2023-11-03 LAB
ANION GAP SERPL CALC-SCNC: 8 MEQ/L (ref 8–16)
BASOPHILS ABSOLUTE: 0 THOU/MM3 (ref 0–0.1)
BASOPHILS NFR BLD AUTO: 0.5 %
BUN SERPL-MCNC: 29 MG/DL (ref 7–22)
CA-I BLD ISE-SCNC: 1.15 MMOL/L (ref 1.12–1.32)
CALCIUM SERPL-MCNC: 8.8 MG/DL (ref 8.5–10.5)
CHLORIDE SERPL-SCNC: 100 MEQ/L (ref 98–111)
CO2 SERPL-SCNC: 27 MEQ/L (ref 23–33)
CREAT SERPL-MCNC: 0.6 MG/DL (ref 0.4–1.2)
DEPRECATED RDW RBC AUTO: 55.5 FL (ref 35–45)
EOSINOPHIL NFR BLD AUTO: 6.4 %
EOSINOPHILS ABSOLUTE: 0.4 THOU/MM3 (ref 0–0.4)
ERYTHROCYTE [DISTWIDTH] IN BLOOD BY AUTOMATED COUNT: 16.2 % (ref 11.5–14.5)
FOLATE SERPL-MCNC: 4.8 NG/ML (ref 4.8–24.2)
GFR SERPL CREATININE-BSD FRML MDRD: > 60 ML/MIN/1.73M2
GLUCOSE BLD STRIP.AUTO-MCNC: 125 MG/DL (ref 70–108)
GLUCOSE BLD STRIP.AUTO-MCNC: 159 MG/DL (ref 70–108)
GLUCOSE BLD STRIP.AUTO-MCNC: 161 MG/DL (ref 70–108)
GLUCOSE BLD STRIP.AUTO-MCNC: 176 MG/DL (ref 70–108)
GLUCOSE BLD STRIP.AUTO-MCNC: 196 MG/DL (ref 70–108)
GLUCOSE BLD STRIP.AUTO-MCNC: 200 MG/DL (ref 70–108)
GLUCOSE SERPL-MCNC: 163 MG/DL (ref 70–108)
HCT VFR BLD AUTO: 25.8 % (ref 37–47)
HGB BLD-MCNC: 8.1 GM/DL (ref 12–16)
IMM GRANULOCYTES # BLD AUTO: 0.07 THOU/MM3 (ref 0–0.07)
IMM GRANULOCYTES NFR BLD AUTO: 1.2 %
LYMPHOCYTES ABSOLUTE: 1.3 THOU/MM3 (ref 1–4.8)
LYMPHOCYTES NFR BLD AUTO: 21.6 %
MAGNESIUM SERPL-MCNC: 1.9 MG/DL (ref 1.6–2.4)
MCH RBC QN AUTO: 29.9 PG (ref 26–33)
MCHC RBC AUTO-ENTMCNC: 31.4 GM/DL (ref 32.2–35.5)
MCV RBC AUTO: 95.2 FL (ref 81–99)
MONOCYTES ABSOLUTE: 0.5 THOU/MM3 (ref 0.4–1.3)
MONOCYTES NFR BLD AUTO: 8.9 %
NEUTROPHILS NFR BLD AUTO: 61.4 %
NRBC BLD AUTO-RTO: 0 /100 WBC
PHOSPHATE SERPL-MCNC: 4.1 MG/DL (ref 2.4–4.7)
PLATELET # BLD AUTO: 255 THOU/MM3 (ref 130–400)
PMV BLD AUTO: 10.3 FL (ref 9.4–12.4)
POTASSIUM SERPL-SCNC: 4.1 MEQ/L (ref 3.5–5.2)
RBC # BLD AUTO: 2.71 MILL/MM3 (ref 4.2–5.4)
SEGMENTED NEUTROPHILS ABSOLUTE COUNT: 3.7 THOU/MM3 (ref 1.8–7.7)
SODIUM SERPL-SCNC: 135 MEQ/L (ref 135–145)
VIT B12 SERPL-MCNC: 1179 PG/ML (ref 211–911)
WBC # BLD AUTO: 6 THOU/MM3 (ref 4.8–10.8)

## 2023-11-03 PROCEDURE — 6360000002 HC RX W HCPCS: Performed by: PHYSICIAN ASSISTANT

## 2023-11-03 PROCEDURE — 2580000003 HC RX 258: Performed by: EMERGENCY MEDICINE

## 2023-11-03 PROCEDURE — 83735 ASSAY OF MAGNESIUM: CPT

## 2023-11-03 PROCEDURE — 6370000000 HC RX 637 (ALT 250 FOR IP): Performed by: INTERNAL MEDICINE

## 2023-11-03 PROCEDURE — 80048 BASIC METABOLIC PNL TOTAL CA: CPT

## 2023-11-03 PROCEDURE — 82746 ASSAY OF FOLIC ACID SERUM: CPT

## 2023-11-03 PROCEDURE — 6370000000 HC RX 637 (ALT 250 FOR IP): Performed by: NURSE PRACTITIONER

## 2023-11-03 PROCEDURE — 94003 VENT MGMT INPAT SUBQ DAY: CPT

## 2023-11-03 PROCEDURE — 99024 POSTOP FOLLOW-UP VISIT: CPT | Performed by: REGISTERED NURSE

## 2023-11-03 PROCEDURE — 99291 CRITICAL CARE FIRST HOUR: CPT | Performed by: INTERNAL MEDICINE

## 2023-11-03 PROCEDURE — 2700000000 HC OXYGEN THERAPY PER DAY

## 2023-11-03 PROCEDURE — 6360000002 HC RX W HCPCS: Performed by: EMERGENCY MEDICINE

## 2023-11-03 PROCEDURE — 6370000000 HC RX 637 (ALT 250 FOR IP): Performed by: EMERGENCY MEDICINE

## 2023-11-03 PROCEDURE — 84100 ASSAY OF PHOSPHORUS: CPT

## 2023-11-03 PROCEDURE — 2580000003 HC RX 258: Performed by: NURSE PRACTITIONER

## 2023-11-03 PROCEDURE — 82948 REAGENT STRIP/BLOOD GLUCOSE: CPT

## 2023-11-03 PROCEDURE — 85025 COMPLETE CBC W/AUTO DIFF WBC: CPT

## 2023-11-03 PROCEDURE — 6360000002 HC RX W HCPCS: Performed by: INTERNAL MEDICINE

## 2023-11-03 PROCEDURE — 36415 COLL VENOUS BLD VENIPUNCTURE: CPT

## 2023-11-03 PROCEDURE — 2500000003 HC RX 250 WO HCPCS: Performed by: INTERNAL MEDICINE

## 2023-11-03 PROCEDURE — 82330 ASSAY OF CALCIUM: CPT

## 2023-11-03 PROCEDURE — 2000000000 HC ICU R&B

## 2023-11-03 PROCEDURE — 94761 N-INVAS EAR/PLS OXIMETRY MLT: CPT

## 2023-11-03 PROCEDURE — 82607 VITAMIN B-12: CPT

## 2023-11-03 RX ORDER — BUMETANIDE 0.25 MG/ML
0.5 INJECTION INTRAMUSCULAR; INTRAVENOUS 2 TIMES DAILY
Status: DISCONTINUED | OUTPATIENT
Start: 2023-11-03 | End: 2023-11-05

## 2023-11-03 RX ADMIN — MICONAZOLE NITRATE: 2 POWDER TOPICAL at 21:14

## 2023-11-03 RX ADMIN — SODIUM CHLORIDE, PRESERVATIVE FREE 10 ML: 5 INJECTION INTRAVENOUS at 21:13

## 2023-11-03 RX ADMIN — FAMOTIDINE 20 MG: 20 TABLET ORAL at 21:13

## 2023-11-03 RX ADMIN — PHENYTOIN SODIUM 50 MG: 50 INJECTION INTRAMUSCULAR; INTRAVENOUS at 08:55

## 2023-11-03 RX ADMIN — LEVETIRACETAM 1000 MG: 100 INJECTION, SOLUTION INTRAVENOUS at 17:30

## 2023-11-03 RX ADMIN — ENOXAPARIN SODIUM 90 MG: 100 INJECTION SUBCUTANEOUS at 21:13

## 2023-11-03 RX ADMIN — CHLORHEXIDINE GLUCONATE 0.12% ORAL RINSE 15 ML: 1.2 LIQUID ORAL at 21:14

## 2023-11-03 RX ADMIN — PHENYTOIN SODIUM 150 MG: 50 INJECTION INTRAMUSCULAR; INTRAVENOUS at 08:55

## 2023-11-03 RX ADMIN — BUMETANIDE 0.5 MG: 0.25 INJECTION INTRAMUSCULAR; INTRAVENOUS at 21:13

## 2023-11-03 RX ADMIN — LEVETIRACETAM 1000 MG: 100 INJECTION, SOLUTION INTRAVENOUS at 05:23

## 2023-11-03 RX ADMIN — INSULIN LISPRO 4 UNITS: 100 INJECTION, SOLUTION INTRAVENOUS; SUBCUTANEOUS at 00:17

## 2023-11-03 RX ADMIN — SODIUM CHLORIDE, PRESERVATIVE FREE 10 ML: 5 INJECTION INTRAVENOUS at 08:56

## 2023-11-03 RX ADMIN — BUMETANIDE 0.5 MG: 0.25 INJECTION INTRAMUSCULAR; INTRAVENOUS at 09:03

## 2023-11-03 RX ADMIN — FAMOTIDINE 20 MG: 20 TABLET ORAL at 08:54

## 2023-11-03 RX ADMIN — CHLORHEXIDINE GLUCONATE 0.12% ORAL RINSE 15 ML: 1.2 LIQUID ORAL at 08:57

## 2023-11-03 RX ADMIN — MICONAZOLE NITRATE: 2 POWDER TOPICAL at 08:56

## 2023-11-03 RX ADMIN — INSULIN GLARGINE 18 UNITS: 100 INJECTION, SOLUTION SUBCUTANEOUS at 21:13

## 2023-11-03 RX ADMIN — MODAFINIL 200 MG: 100 TABLET ORAL at 08:54

## 2023-11-03 RX ADMIN — ENOXAPARIN SODIUM 90 MG: 100 INJECTION SUBCUTANEOUS at 08:54

## 2023-11-03 ASSESSMENT — PULMONARY FUNCTION TESTS
PIF_VALUE: 13
PIF_VALUE: 13
PIF_VALUE: 14
PIF_VALUE: 14
PIF_VALUE: 13
PIF_VALUE: 14

## 2023-11-03 NOTE — PROGRESS NOTES
CRITICAL CARE PROGRESS NOTE      Patient:  Denys Allan    Unit/Bed:4D-08/008-A  YOB: 1950  MRN: [id number]   PCP: Haskel Oppenheim, MD  Date of Admission: 10/12/2023  Chief Complaint:- tracheal stenosis s/p resection     Assessment and Plan:      Anoxic Brain Injury 2/2 respiratory arrest and subsequent cardiac arrest following failed extubation 10/16  EEG: No seizures, findings consistent with encephalopathy (mod-severe). Pt has cough and gag, extends to pain but not following commands; positive Babinski sign. MRI Brain 10/19: No evidence of anoxia, no evidence of acute infarct. Provigil 200mg to promote wakefulness  Phenytoin 50 mg IV QD (150 mg IV Q12 hrs also), Keppra 1000 mg IV QD   Continue to monitor. Hypoxic respiratory failure:  Patient is vent-dependent without sedation. SBT - no cuff leak, barrier to extubation = GCS 4  s/p cricotracheal resection, laryngoplasty with flap and vocal fold lateralization stitch placement with Dr. Gayle Britt 10/12  HOB at 30 degrees with pillow under head for semi-flexed position per ENT  Left RACHEL drain:  5 cc serosanguinous drainage overnight. Right RACHEL drain removed by ENT 10/27  10/18: 5.0 ET tube exchanged for 7.0 ET tube by Dr. Gayle Britt     Bilateral DVTs, superficial:    Venous Duplex B/L Lower Extremity 10/12: thrombus in BL posterior tibial veins and right peroneal vein   Venous Duplex Upper Extremities 10/23: Left axillary, brachial, basilic and cephalic vein thrombus. Right cephalic and antecubital thrombosis   Lovenox resumed 10/28  Repeat V Dup B/L UE did not fine RUE DVTs, LUROMÁN still has several clots and superficial thrombophlebitis in the L basilic vein but overall improved. DM Type 2:  01/2023 HbA1c: 5.5   Patient on 18 units Lantus nightly   High dose SSI     Chronic diastolic CHF:  History   ECHO 1/2/23: EF 60-65%  Strict I/O   Monitor for fluid overload  Bumex 0.5 QD    Leukocytosis:   Peak 17.2 on 10/24. Resolved.

## 2023-11-03 NOTE — PLAN OF CARE
Problem: Respiratory - Adult  Goal: Achieves optimal ventilation and oxygenation  Outcome: Progressing  Achieves optimal ventilation and oxygenation:   Assess for changes in respiratory status   Position to facilitate oxygenation and minimize respiratory effort   Assess the need for suctioning and aspirate as needed   Respiratory therapy support as indicated   Assess for changes in mentation and behavior   Oxygen supplementation based on oxygen saturation or arterial blood gases   Encourage broncho-pulmonary hygiene including cough, deep breathe, incentive spirometry   Assess and instruct to report shortness of breath or any respiratory difficulty  Note: Intubated/Vented. Wean as tolerated. SBT when appropriate. Patient Weaning Progress    The patient's vent settings was able to be weaned this shift. Ventilator settings that were weaned              [x] Mode   [] Pressure support weaned   [] Fio2 weaned   [] Peep weaned      Spontaneous weaning trial  was attempted. The patient was able to tolerate SBT. RSBI  was 35 with EtCO2 of 43 and SpO2 of 96 on 25% FiO2. Spontanteous VT was 467 and RR 15 breaths/min. Cuff was not  deflated to determine cuff leak. Unable to get agreement for goals because no family is present and patient cannot respond.

## 2023-11-03 NOTE — PROGRESS NOTES
Physician Progress Note      Sabine Tripp  CSN #:                  [id number]  :                       1950  ADMIT DATE:       10/12/2023 6:19 AM  [facility] DATE:  RESPONDING  PROVIDER #:        Amy Hung TEXT:    Pt admitted with Anoxic Brain Injury 2/2 respiratory arrest and subsequent   cardiac arrest following failed extubation 10/16 and has \"Hypoxic respiratory   failure:\" documented. If possible, please document in progress notes and   discharge summary further specificity regarding the type and acuity of   respiratory failure: The medical record reflects the following:  Risk Factors Patient is vent-dependent without sedation. Clinical Indicators:  11-1 documented: Hypoxic respiratory failure: Patient is   vent-dependent without sedation. SBT - no cuff leak, barrier to extubation =   GCS 4 s/p cricotracheal resection, laryngoplasty with flap and vocal fold   lateralization stitch placement with Dr. Tee Martinez 10/12  Treatment: Chronic VENT: HOB at 30 degrees with pillow under head for   semi-flexed position per ENT Left RACHEL drain:  5 cc serosanguinous drainage   overnight. Right RACHEL drain removed by ENT 10/27 10/18: 5.0 ET tube exchanged   for 7.0 ET tube by Dr. Tee Martinez    Thank you. Shree Medrano RN, Clinical Documentation Integrity, [employer], [facility]), CRCR  Options provided:  -- Acute on chronic respiratory failure with hypoxia  -- Acute respiratory failure with hypoxia  -- Chronic respiratory failure with hypoxia  -- Other - I will add my own diagnosis  -- Disagree - Not applicable / Not valid  -- Disagree - Clinically unable to determine / Unknown  -- Refer to Clinical Documentation Reviewer    PROVIDER RESPONSE TEXT:    This patient is in acute on chronic respiratory failure with hypoxia.     Query created by: Chet Goff on 2023 10:53 AM      Electronically signed by:  Genia Johnson 11/3/2023 9:07

## 2023-11-03 NOTE — PLAN OF CARE
Problem: Discharge Planning  Goal: Discharge to home or other facility with appropriate resources  Outcome: Progressing  Flowsheets (Taken 11/2/2023 1930)  Discharge to home or other facility with appropriate resources:   Identify barriers to discharge with patient and caregiver   Arrange for needed discharge resources and transportation as appropriate   Identify discharge learning needs (meds, wound care, etc)     Problem: Chronic Conditions and Co-morbidities  Goal: Patient's chronic conditions and co-morbidity symptoms are monitored and maintained or improved  Outcome: Progressing  Flowsheets (Taken 11/2/2023 1930)  Care Plan - Patient's Chronic Conditions and Co-Morbidity Symptoms are Monitored and Maintained or Improved:   Monitor and assess patient's chronic conditions and comorbid symptoms for stability, deterioration, or improvement   Collaborate with multidisciplinary team to address chronic and comorbid conditions and prevent exacerbation or deterioration   Update acute care plan with appropriate goals if chronic or comorbid symptoms are exacerbated and prevent overall improvement and discharge     Problem: Safety - Adult  Goal: Free from fall injury  Outcome: Progressing     Problem: Pain  Goal: Verbalizes/displays adequate comfort level or baseline comfort level  Outcome: Progressing  Flowsheets (Taken 11/2/2023 1930)  Verbalizes/displays adequate comfort level or baseline comfort level:   Encourage patient to monitor pain and request assistance   Assess pain using appropriate pain scale   Implement non-pharmacological measures as appropriate and evaluate response   Administer analgesics based on type and severity of pain and evaluate response   Consider cultural and social influences on pain and pain management   Notify Licensed Independent Practitioner if interventions unsuccessful or patient reports new pain     Problem: Respiratory - Adult  Goal: Achieves optimal ventilation and dressing/incision, wound bed, drain sites and surrounding tissue   Implement wound care per orders   Initiate isolation precautions as appropriate   Initiate pressure ulcer prevention bundle as indicated     Problem: Musculoskeletal - Adult  Goal: Maintain proper alignment of affected body part  Outcome: Progressing  Flowsheets (Taken 11/2/2023 1930)  Maintain proper alignment of affected body part: Support and protect limb and body alignment per provider's orders     Problem: Gastrointestinal - Adult  Goal: Minimal or absence of nausea and vomiting  Outcome: Progressing  Flowsheets (Taken 11/2/2023 1930)  Minimal or absence of nausea and vomiting:   Administer IV fluids as ordered to ensure adequate hydration   Provide nonpharmacologic comfort measures as appropriate   Administer ordered antiemetic medications as needed   Nutrition consult to assist patient with adequate nutrition and appropriate food choices  Goal: Maintains adequate nutritional intake  Outcome: Progressing  Flowsheets (Taken 11/2/2023 1930)  Maintains adequate nutritional intake:   Monitor percentage of each meal consumed   Monitor intake and output, weight and lab values   Obtain nutritional consult as needed     Problem: Genitourinary - Adult  Goal: Urinary catheter remains patent  Outcome: Progressing  Flowsheets (Taken 11/2/2023 1930)  Urinary catheter remains patent:   Assess patency of urinary catheter   Irrigate catheter per Licensed Independent Practitioner order if indicated and notify Licensed Independent Practitioner if unable to irrigate   Assess need for a larger catheter size or a 3-way catheter for continuous bladder irrigation     Problem: Coping  Goal: Pt/Family able to verbalize concerns and demonstrate effective coping strategies  Description: INTERVENTIONS:  1. Assist patient/family to identify coping skills, available support systems and cultural and spiritual values  2.  Provide emotional support, including active listening

## 2023-11-03 NOTE — PLAN OF CARE
Problem: Respiratory - Adult  Goal: Achieves optimal ventilation and oxygenation  Outcome: Progressing  Achieves optimal ventilation and oxygenation:   Assess for changes in respiratory status   Position to facilitate oxygenation and minimize respiratory effort   Assess the need for suctioning and aspirate as needed   Respiratory therapy support as indicated   Assess for changes in mentation and behavior   Oxygen supplementation based on oxygen saturation or arterial blood gases   Encourage broncho-pulmonary hygiene including cough, deep breathe, incentive spirometry   Assess and instruct to report shortness of breath or any respiratory difficulty  Note: Intubated/Vented. Wean as tolerated. SBT when appropriate. Patient Weaning Progress     The patient's vent settings was able to be weaned this shift. Ventilator settings that were weaned              [x] Mode             [] Pressure support weaned             [] Fio2 weaned             [] Peep weaned        Spontaneous weaning trial  was attempted. pt was able to tolerated a spontaneous breathing trial throughout the whole day. VSS. Patients RSBI within normal limits.

## 2023-11-03 NOTE — CARE COORDINATION
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 There is also nonocclusive thrombus within the left posterior tibial vein  10/16 Extubated to bipap  10/16 Code Blue  10/16 Reintubated  10/16 Bronch: thick bloody secretions in RLL removed  10/16 CXR: Mild stable cardiomegaly with pulmonary vascular congestion suspicious for congestive heart failure; Prominent pulmonary interstitium suspicious for pulmonary edema  10/17 5 hr EEG: No seizures noted; diffuse background attenuation and suppression without evidence of reactivity; severe encephalopathy  10/17 CT Head: Stable CT appearance the brain. No acute findings  10/17 MRI Brain: No acute findings  97/21 PICC left basilic  59/14 CT Head: No acute findings  10/18 OR: ET Exchange bronhoscopy and lavage  10/19 4hr 40 min EEG: This EEG was abnormal. The background abnormalities indicated a moderate to severe encephalopathy, nonspecific to etiology. The generalized periodic discharges (GPDs) indicated underlying cortical irritability/increased risk of seizures, and can be seen in various encephalopathies. No seizures were recorded  10/19 MRI Brain: No evidence of an acute infarct. No evidence of anoxia; Stable mild severity chronic small vessel ischemic changes; Global volume loss  10/23 4-day EEG: Moderate to severe encephalopathy, improved as recording progressed.  Frequent and at times near continuous runs of GPDs with often typical and at times atypical triphasic

## 2023-11-04 LAB
ANION GAP SERPL CALC-SCNC: 8 MEQ/L (ref 8–16)
BASOPHILS ABSOLUTE: 0 THOU/MM3 (ref 0–0.1)
BASOPHILS NFR BLD AUTO: 0.3 %
BUN SERPL-MCNC: 29 MG/DL (ref 7–22)
CA-I BLD ISE-SCNC: 1.2 MMOL/L (ref 1.12–1.32)
CALCIUM SERPL-MCNC: 8.5 MG/DL (ref 8.5–10.5)
CHLORIDE SERPL-SCNC: 100 MEQ/L (ref 98–111)
CO2 SERPL-SCNC: 28 MEQ/L (ref 23–33)
CREAT SERPL-MCNC: 0.6 MG/DL (ref 0.4–1.2)
DEPRECATED RDW RBC AUTO: 58 FL (ref 35–45)
EOSINOPHIL NFR BLD AUTO: 5.8 %
EOSINOPHILS ABSOLUTE: 0.4 THOU/MM3 (ref 0–0.4)
ERYTHROCYTE [DISTWIDTH] IN BLOOD BY AUTOMATED COUNT: 16.2 % (ref 11.5–14.5)
GFR SERPL CREATININE-BSD FRML MDRD: > 60 ML/MIN/1.73M2
GLUCOSE BLD STRIP.AUTO-MCNC: 107 MG/DL (ref 70–108)
GLUCOSE BLD STRIP.AUTO-MCNC: 144 MG/DL (ref 70–108)
GLUCOSE BLD STRIP.AUTO-MCNC: 196 MG/DL (ref 70–108)
GLUCOSE SERPL-MCNC: 147 MG/DL (ref 70–108)
HCT VFR BLD AUTO: 26.8 % (ref 37–47)
HGB BLD-MCNC: 8.3 GM/DL (ref 12–16)
IMM GRANULOCYTES # BLD AUTO: 0.05 THOU/MM3 (ref 0–0.07)
IMM GRANULOCYTES NFR BLD AUTO: 0.7 %
LYMPHOCYTES ABSOLUTE: 1.5 THOU/MM3 (ref 1–4.8)
LYMPHOCYTES NFR BLD AUTO: 22.1 %
MAGNESIUM SERPL-MCNC: 2 MG/DL (ref 1.6–2.4)
MCH RBC QN AUTO: 30 PG (ref 26–33)
MCHC RBC AUTO-ENTMCNC: 31 GM/DL (ref 32.2–35.5)
MCV RBC AUTO: 96.8 FL (ref 81–99)
MONOCYTES ABSOLUTE: 0.5 THOU/MM3 (ref 0.4–1.3)
MONOCYTES NFR BLD AUTO: 7.1 %
NEUTROPHILS NFR BLD AUTO: 64 %
NRBC BLD AUTO-RTO: 0 /100 WBC
PHOSPHATE SERPL-MCNC: 4.9 MG/DL (ref 2.4–4.7)
PLATELET # BLD AUTO: 253 THOU/MM3 (ref 130–400)
PMV BLD AUTO: 10.2 FL (ref 9.4–12.4)
POTASSIUM SERPL-SCNC: 4.2 MEQ/L (ref 3.5–5.2)
RBC # BLD AUTO: 2.77 MILL/MM3 (ref 4.2–5.4)
SEGMENTED NEUTROPHILS ABSOLUTE COUNT: 4.4 THOU/MM3 (ref 1.8–7.7)
SODIUM SERPL-SCNC: 136 MEQ/L (ref 135–145)
WBC # BLD AUTO: 6.9 THOU/MM3 (ref 4.8–10.8)

## 2023-11-04 PROCEDURE — 6370000000 HC RX 637 (ALT 250 FOR IP): Performed by: NURSE PRACTITIONER

## 2023-11-04 PROCEDURE — 82948 REAGENT STRIP/BLOOD GLUCOSE: CPT

## 2023-11-04 PROCEDURE — 99291 CRITICAL CARE FIRST HOUR: CPT | Performed by: INTERNAL MEDICINE

## 2023-11-04 PROCEDURE — 36415 COLL VENOUS BLD VENIPUNCTURE: CPT

## 2023-11-04 PROCEDURE — 2500000003 HC RX 250 WO HCPCS: Performed by: INTERNAL MEDICINE

## 2023-11-04 PROCEDURE — 85025 COMPLETE CBC W/AUTO DIFF WBC: CPT

## 2023-11-04 PROCEDURE — 2700000000 HC OXYGEN THERAPY PER DAY

## 2023-11-04 PROCEDURE — 6370000000 HC RX 637 (ALT 250 FOR IP): Performed by: EMERGENCY MEDICINE

## 2023-11-04 PROCEDURE — 94761 N-INVAS EAR/PLS OXIMETRY MLT: CPT

## 2023-11-04 PROCEDURE — 6360000002 HC RX W HCPCS: Performed by: EMERGENCY MEDICINE

## 2023-11-04 PROCEDURE — 94003 VENT MGMT INPAT SUBQ DAY: CPT

## 2023-11-04 PROCEDURE — 84100 ASSAY OF PHOSPHORUS: CPT

## 2023-11-04 PROCEDURE — 2580000003 HC RX 258: Performed by: EMERGENCY MEDICINE

## 2023-11-04 PROCEDURE — 83735 ASSAY OF MAGNESIUM: CPT

## 2023-11-04 PROCEDURE — 6360000002 HC RX W HCPCS: Performed by: INTERNAL MEDICINE

## 2023-11-04 PROCEDURE — 2580000003 HC RX 258: Performed by: NURSE PRACTITIONER

## 2023-11-04 PROCEDURE — 82330 ASSAY OF CALCIUM: CPT

## 2023-11-04 PROCEDURE — 80048 BASIC METABOLIC PNL TOTAL CA: CPT

## 2023-11-04 PROCEDURE — 99024 POSTOP FOLLOW-UP VISIT: CPT | Performed by: REGISTERED NURSE

## 2023-11-04 PROCEDURE — 2000000000 HC ICU R&B

## 2023-11-04 RX ADMIN — INSULIN GLARGINE 18 UNITS: 100 INJECTION, SOLUTION SUBCUTANEOUS at 21:00

## 2023-11-04 RX ADMIN — FAMOTIDINE 20 MG: 20 TABLET ORAL at 21:31

## 2023-11-04 RX ADMIN — LEVETIRACETAM 1000 MG: 100 INJECTION, SOLUTION INTRAVENOUS at 18:48

## 2023-11-04 RX ADMIN — SODIUM CHLORIDE, PRESERVATIVE FREE 10 ML: 5 INJECTION INTRAVENOUS at 07:35

## 2023-11-04 RX ADMIN — CHLORHEXIDINE GLUCONATE 0.12% ORAL RINSE 15 ML: 1.2 LIQUID ORAL at 21:32

## 2023-11-04 RX ADMIN — SODIUM CHLORIDE, PRESERVATIVE FREE 10 ML: 5 INJECTION INTRAVENOUS at 21:32

## 2023-11-04 RX ADMIN — MICONAZOLE NITRATE: 2 POWDER TOPICAL at 21:33

## 2023-11-04 RX ADMIN — MICONAZOLE NITRATE: 2 POWDER TOPICAL at 07:34

## 2023-11-04 RX ADMIN — FAMOTIDINE 20 MG: 20 TABLET ORAL at 07:33

## 2023-11-04 RX ADMIN — MODAFINIL 200 MG: 100 TABLET ORAL at 08:17

## 2023-11-04 RX ADMIN — BUMETANIDE 0.5 MG: 0.25 INJECTION INTRAMUSCULAR; INTRAVENOUS at 21:31

## 2023-11-04 RX ADMIN — CHLORHEXIDINE GLUCONATE 0.12% ORAL RINSE 15 ML: 1.2 LIQUID ORAL at 07:34

## 2023-11-04 RX ADMIN — BUMETANIDE 0.5 MG: 0.25 INJECTION INTRAMUSCULAR; INTRAVENOUS at 07:33

## 2023-11-04 RX ADMIN — ENOXAPARIN SODIUM 90 MG: 100 INJECTION SUBCUTANEOUS at 21:00

## 2023-11-04 RX ADMIN — LEVETIRACETAM 1000 MG: 100 INJECTION, SOLUTION INTRAVENOUS at 05:52

## 2023-11-04 RX ADMIN — ENOXAPARIN SODIUM 90 MG: 100 INJECTION SUBCUTANEOUS at 07:33

## 2023-11-04 ASSESSMENT — PULMONARY FUNCTION TESTS
PIF_VALUE: 13
PIF_VALUE: 14
PIF_VALUE: 13
PIF_VALUE: 14

## 2023-11-04 NOTE — PLAN OF CARE
Problem: Respiratory - Adult  Goal: Normal spontaneous ventilation  11/4/2023 1721 by Maddison Shah RCP  Outcome: Progressing     Problem: Respiratory - Adult  Goal: Achieves optimal ventilation and oxygenation  11/4/2023 1721 by Maddison Shah RCP  Outcome: Progressing                                                 Patient Weaning Progress    Spontaneous weaning trial  was attempted. The patient was able to tolerate SBT. The patient was on SBT for 12 hours. Cuff was not  deflated to determine cuff leak. Unable to get agreement for goals because no family is present and patient cannot respond.

## 2023-11-04 NOTE — PLAN OF CARE
Problem: Discharge Planning  Goal: Discharge to home or other facility with appropriate resources  Outcome: Progressing  Flowsheets (Taken 11/3/2023 2000)  Discharge to home or other facility with appropriate resources:   Identify barriers to discharge with patient and caregiver   Arrange for needed discharge resources and transportation as appropriate   Identify discharge learning needs (meds, wound care, etc)     Problem: Chronic Conditions and Co-morbidities  Goal: Patient's chronic conditions and co-morbidity symptoms are monitored and maintained or improved  Outcome: Progressing  Flowsheets (Taken 11/3/2023 2000)  Care Plan - Patient's Chronic Conditions and Co-Morbidity Symptoms are Monitored and Maintained or Improved:   Monitor and assess patient's chronic conditions and comorbid symptoms for stability, deterioration, or improvement   Collaborate with multidisciplinary team to address chronic and comorbid conditions and prevent exacerbation or deterioration   Update acute care plan with appropriate goals if chronic or comorbid symptoms are exacerbated and prevent overall improvement and discharge     Problem: Safety - Adult  Goal: Free from fall injury  Outcome: Progressing     Problem: Pain  Goal: Verbalizes/displays adequate comfort level or baseline comfort level  Outcome: Progressing  Flowsheets (Taken 11/3/2023 2000)  Verbalizes/displays adequate comfort level or baseline comfort level:   Encourage patient to monitor pain and request assistance   Assess pain using appropriate pain scale   Administer analgesics based on type and severity of pain and evaluate response   Implement non-pharmacological measures as appropriate and evaluate response   Consider cultural and social influences on pain and pain management   Notify Licensed Independent Practitioner if interventions unsuccessful or patient reports new pain     Problem: Respiratory - Adult  Goal: Achieves optimal ventilation and oxygenation  Outcome: Progressing  Flowsheets (Taken 11/3/2023 2000)  Achieves optimal ventilation and oxygenation:   Assess for changes in respiratory status   Assess for changes in mentation and behavior   Position to facilitate oxygenation and minimize respiratory effort   Oxygen supplementation based on oxygen saturation or arterial blood gases   Encourage broncho-pulmonary hygiene including cough, deep breathe, incentive spirometry   Assess the need for suctioning and aspirate as needed   Assess and instruct to report shortness of breath or any respiratory difficulty   Respiratory therapy support as indicated     Problem: Nutrition Deficit:  Goal: Optimize nutritional status  Outcome: Progressing     Problem: Skin/Tissue Integrity  Goal: Absence of new skin breakdown  Description: 1. Monitor for areas of redness and/or skin breakdown  2. Assess vascular access sites hourly  3. Every 4-6 hours minimum:  Change oxygen saturation probe site  4. Every 4-6 hours:  If on nasal continuous positive airway pressure, respiratory therapy assess nares and determine need for appliance change or resting period. Outcome: Progressing     Problem: Neurosensory - Adult  Goal: Achieves stable or improved neurological status  Outcome: Progressing  Flowsheets (Taken 11/3/2023 2000)  Achieves stable or improved neurological status:   Assess for and report changes in neurological status   Initiate measures to prevent increased intracranial pressure   Maintain blood pressure and fluid volume within ordered parameters to optimize cerebral perfusion and minimize risk of hemorrhage   Monitor temperature, glucose, and sodium. Initiate appropriate interventions as ordered  Goal: Absence of seizures  Outcome: Progressing  Flowsheets (Taken 11/3/2023 2000)  Absence of seizures:   Monitor for seizure activity.   If seizure occurs, document type and location of movements and any associated apnea   If seizure occurs, turn head to side and

## 2023-11-04 NOTE — PLAN OF CARE
Problem: Respiratory - Adult  Goal: Normal spontaneous ventilation  Outcome: Progressing     Problem: Respiratory - Adult  Goal: Achieves optimal ventilation and oxygenation  11/4/2023 0608 by Adair Ny RCP  Outcome: Progressing                                                Patient Weaning Progress    The patient's vent settings was able to be weaned this shift. Ventilator settings that were weaned              [x] Mode   [] Pressure support weaned   [] Fio2 weaned   [] Peep weaned      Spontaneous weaning trial  was attempted. The patient was able to tolerate SBT. RSBI  was 26.41 with EtCO2 of 38 and SpO2 of 97 on 25% FiO2. Spontanteous VT was 568 and RR 15 breaths/min. The patient's SBT began at [phone]. Evac tube was not  hooked up with continuous low suction(20-30mmHg)      Cuff was  deflated to determine cuff leak. A leak  was not detected. Unable to get agreement for goals because no family is present and patient cannot respond.

## 2023-11-05 LAB
ANION GAP SERPL CALC-SCNC: 5 MEQ/L (ref 8–16)
BUN SERPL-MCNC: 24 MG/DL (ref 7–22)
CALCIUM SERPL-MCNC: 9 MG/DL (ref 8.5–10.5)
CHLORIDE SERPL-SCNC: 103 MEQ/L (ref 98–111)
CO2 SERPL-SCNC: 31 MEQ/L (ref 23–33)
CREAT SERPL-MCNC: 0.6 MG/DL (ref 0.4–1.2)
DEPRECATED RDW RBC AUTO: 56.4 FL (ref 35–45)
ERYTHROCYTE [DISTWIDTH] IN BLOOD BY AUTOMATED COUNT: 15.9 % (ref 11.5–14.5)
GFR SERPL CREATININE-BSD FRML MDRD: > 60 ML/MIN/1.73M2
GLUCOSE BLD STRIP.AUTO-MCNC: 149 MG/DL (ref 70–108)
GLUCOSE BLD STRIP.AUTO-MCNC: 154 MG/DL (ref 70–108)
GLUCOSE BLD STRIP.AUTO-MCNC: 194 MG/DL (ref 70–108)
GLUCOSE BLD STRIP.AUTO-MCNC: 88 MG/DL (ref 70–108)
GLUCOSE SERPL-MCNC: 87 MG/DL (ref 70–108)
HCT VFR BLD AUTO: 27 % (ref 37–47)
HGB BLD-MCNC: 8.5 GM/DL (ref 12–16)
MCH RBC QN AUTO: 30.5 PG (ref 26–33)
MCHC RBC AUTO-ENTMCNC: 31.5 GM/DL (ref 32.2–35.5)
MCV RBC AUTO: 96.8 FL (ref 81–99)
PLATELET # BLD AUTO: 248 THOU/MM3 (ref 130–400)
PMV BLD AUTO: 9.6 FL (ref 9.4–12.4)
POTASSIUM SERPL-SCNC: 4 MEQ/L (ref 3.5–5.2)
RBC # BLD AUTO: 2.79 MILL/MM3 (ref 4.2–5.4)
SODIUM SERPL-SCNC: 139 MEQ/L (ref 135–145)
WBC # BLD AUTO: 5.6 THOU/MM3 (ref 4.8–10.8)

## 2023-11-05 PROCEDURE — 85027 COMPLETE CBC AUTOMATED: CPT

## 2023-11-05 PROCEDURE — 2580000003 HC RX 258: Performed by: EMERGENCY MEDICINE

## 2023-11-05 PROCEDURE — 2580000003 HC RX 258: Performed by: NURSE PRACTITIONER

## 2023-11-05 PROCEDURE — 6370000000 HC RX 637 (ALT 250 FOR IP): Performed by: EMERGENCY MEDICINE

## 2023-11-05 PROCEDURE — 2500000003 HC RX 250 WO HCPCS

## 2023-11-05 PROCEDURE — 6370000000 HC RX 637 (ALT 250 FOR IP): Performed by: NURSE PRACTITIONER

## 2023-11-05 PROCEDURE — 80048 BASIC METABOLIC PNL TOTAL CA: CPT

## 2023-11-05 PROCEDURE — 99291 CRITICAL CARE FIRST HOUR: CPT | Performed by: INTERNAL MEDICINE

## 2023-11-05 PROCEDURE — 94003 VENT MGMT INPAT SUBQ DAY: CPT

## 2023-11-05 PROCEDURE — 2000000000 HC ICU R&B

## 2023-11-05 PROCEDURE — 6360000002 HC RX W HCPCS: Performed by: INTERNAL MEDICINE

## 2023-11-05 PROCEDURE — 2700000000 HC OXYGEN THERAPY PER DAY

## 2023-11-05 PROCEDURE — 82948 REAGENT STRIP/BLOOD GLUCOSE: CPT

## 2023-11-05 PROCEDURE — 6360000002 HC RX W HCPCS: Performed by: EMERGENCY MEDICINE

## 2023-11-05 PROCEDURE — 94761 N-INVAS EAR/PLS OXIMETRY MLT: CPT

## 2023-11-05 PROCEDURE — 99024 POSTOP FOLLOW-UP VISIT: CPT | Performed by: REGISTERED NURSE

## 2023-11-05 PROCEDURE — 36415 COLL VENOUS BLD VENIPUNCTURE: CPT

## 2023-11-05 RX ORDER — BUMETANIDE 0.25 MG/ML
1 INJECTION INTRAMUSCULAR; INTRAVENOUS 2 TIMES DAILY
Status: DISCONTINUED | OUTPATIENT
Start: 2023-11-05 | End: 2023-11-17

## 2023-11-05 RX ORDER — BUMETANIDE 0.25 MG/ML
1 INJECTION INTRAMUSCULAR; INTRAVENOUS ONCE
Status: COMPLETED | OUTPATIENT
Start: 2023-11-05 | End: 2023-11-05

## 2023-11-05 RX ADMIN — BUMETANIDE 1 MG: 0.25 INJECTION INTRAMUSCULAR; INTRAVENOUS at 20:37

## 2023-11-05 RX ADMIN — BUMETANIDE 1 MG: 0.25 INJECTION INTRAMUSCULAR; INTRAVENOUS at 10:10

## 2023-11-05 RX ADMIN — CHLORHEXIDINE GLUCONATE 0.12% ORAL RINSE 15 ML: 1.2 LIQUID ORAL at 09:49

## 2023-11-05 RX ADMIN — INSULIN GLARGINE 18 UNITS: 100 INJECTION, SOLUTION SUBCUTANEOUS at 20:36

## 2023-11-05 RX ADMIN — FAMOTIDINE 20 MG: 20 TABLET ORAL at 20:36

## 2023-11-05 RX ADMIN — LEVETIRACETAM 1000 MG: 100 INJECTION, SOLUTION INTRAVENOUS at 18:04

## 2023-11-05 RX ADMIN — SODIUM CHLORIDE, PRESERVATIVE FREE 10 ML: 5 INJECTION INTRAVENOUS at 10:17

## 2023-11-05 RX ADMIN — ENOXAPARIN SODIUM 90 MG: 100 INJECTION SUBCUTANEOUS at 20:36

## 2023-11-05 RX ADMIN — FAMOTIDINE 20 MG: 20 TABLET ORAL at 09:49

## 2023-11-05 RX ADMIN — MODAFINIL 200 MG: 100 TABLET ORAL at 09:49

## 2023-11-05 RX ADMIN — LEVETIRACETAM 1000 MG: 100 INJECTION, SOLUTION INTRAVENOUS at 05:02

## 2023-11-05 RX ADMIN — CHLORHEXIDINE GLUCONATE 0.12% ORAL RINSE 15 ML: 1.2 LIQUID ORAL at 20:37

## 2023-11-05 RX ADMIN — SODIUM CHLORIDE, PRESERVATIVE FREE 10 ML: 5 INJECTION INTRAVENOUS at 20:38

## 2023-11-05 RX ADMIN — ENOXAPARIN SODIUM 90 MG: 100 INJECTION SUBCUTANEOUS at 09:48

## 2023-11-05 RX ADMIN — MICONAZOLE NITRATE: 2 POWDER TOPICAL at 20:39

## 2023-11-05 RX ADMIN — MICONAZOLE NITRATE: 2 POWDER TOPICAL at 10:00

## 2023-11-05 ASSESSMENT — PULMONARY FUNCTION TESTS
PIF_VALUE: 13
PIF_VALUE: 13
PIF_VALUE: 14
PIF_VALUE: 12
PIF_VALUE: 14
PIF_VALUE: 12

## 2023-11-05 NOTE — PROGRESS NOTES
CRITICAL CARE PROGRESS NOTE      Patient:  Denys Allan    Unit/Bed:4D-08/008-A  YOB: 1950  MRN: [id number]   PCP: Haskel Oppenheim, MD  Date of Admission: 10/12/2023  Chief Complaint:-Tracheal stenosis s/p resection    Assessment and Plan:    Anoxic Brain Injury 2/2 respiratory arrest and subsequent cardiac arrest following failed extubation 10/16. MRI brain 10/19 shows no evidence of anoxia, no evidence of acute infarct. Most recent EEG no seizures, findings consistent with encephalopathy (moderate-severe)  Pt has cough and gag, extends to pain but not following commands; positive Babinski sign. Patient is slowly improving. Continue Provigil 200mg to promote wakefulness  Continue phenytoin 50 mg IV QD (150 mg IV Q12 hrs also), Keppra 1000 mg IV QD   Continue to monitor. Hypoxic respiratory failure: Patient is vent-dependent without sedation. SBT - no cuff leak, barrier to extubation = GCS 4. s/p cricotracheal resection, laryngoplasty with flap and vocal fold lateralization stitch placement with Dr. Gayle Britt 10/12.  10/18: 5.0 ET tube exchanged for 7.0 ET tube by Dr. Gayle Britt. Right RACHEL drain removed by ENT 10/27  HOB at 30 degrees with pillow under head for semi-flexed position per ENT  Left RACHEL drain in place. Bilateral DVTs, superficial:  Venous Duplex B/L Lower Extremity 10/12: thrombus in BL posterior tibial veins and right peroneal vein. Venous Duplex Upper Extremities 10/23: Left axillary, brachial, basilic and cephalic vein thrombus. Right cephalic and antecubital thrombosis. Repeat V Dup B/L UE did not find RUE DVTs, LUROMÁN still has several clots and superficial thrombophlebitis in the L basilic vein but overall improved. Lovenox resumed 10/28,Continue. DM Type 2:  01/2023 HbA1c: 5.5   Patient on 18 units Lantus nightly   High dose SSI      Chronic diastolic CHF:  History.  ECHO 1/2/23: EF 60-65%  Strict I/O   Monitor for fluid overload  Bumex increased to 1 mg

## 2023-11-05 NOTE — PLAN OF CARE
Problem: Respiratory - Adult  Goal: Normal spontaneous ventilation  11/5/2023 0437 by Jazzy Wilkerson RCP  Outcome: Progressing     Problem: Respiratory - Adult  Goal: Achieves optimal ventilation and oxygenation  11/5/2023 0437 by Jazzy Wilkerson RCP  Outcome: Hayden Gallardo (Taken 11/4/2023 2003 by Mariah Mehta RN)  Achieves optimal ventilation and oxygenation:   Assess for changes in respiratory status   Position to facilitate oxygenation and minimize respiratory effort   Assess for changes in mentation and behavior                                                 Patient Weaning Progress    The patient's vent settings was able to be weaned this shift. Ventilator settings that were weaned              [x] Mode   [] Pressure support weaned   [] Fio2 weaned   [] Peep weaned      Spontaneous weaning trial  was attempted. *Results of SBT documented under SBTOUTCOME note. Reason that defined ventilator parameters for SBT was not met              [] Patient condition requires increased ventilator settings  [] Requires increased sedation   [] Settings not within weaning range   [] SAT not completed   [] Physician orders    The patient was able to tolerate SBT. RSBI  was 32 with EtCO2 of 40 and SpO2 of 93 on 25% FiO2. Spontanteous VT was 468 and RR 15 breaths/min. Evac tube was not  hooked up with continuous low suction(20-30mmHg)      Cuff was not  deflated to determine cuff leak. Unable to get agreement for goals because no family is present and patient cannot respond.

## 2023-11-05 NOTE — PLAN OF CARE
Problem: Discharge Planning  Goal: Discharge to home or other facility with appropriate resources  Outcome: Not Progressing  Flowsheets (Taken 11/5/2023 0745)  Discharge to home or other facility with appropriate resources:   Identify barriers to discharge with patient and caregiver   Arrange for needed discharge resources and transportation as appropriate   Identify discharge learning needs (meds, wound care, etc)   Refer to discharge planning if patient needs post-hospital services based on physician order or complex needs related to functional status, cognitive ability or social support system     Problem: Chronic Conditions and Co-morbidities  Goal: Patient's chronic conditions and co-morbidity symptoms are monitored and maintained or improved  Outcome: Progressing  Flowsheets (Taken 11/5/2023 0730)  Care Plan - Patient's Chronic Conditions and Co-Morbidity Symptoms are Monitored and Maintained or Improved:   Monitor and assess patient's chronic conditions and comorbid symptoms for stability, deterioration, or improvement   Collaborate with multidisciplinary team to address chronic and comorbid conditions and prevent exacerbation or deterioration   Update acute care plan with appropriate goals if chronic or comorbid symptoms are exacerbated and prevent overall improvement and discharge     Problem: Safety - Adult  Goal: Free from fall injury  Outcome: Progressing     Problem: Pain  Goal: Verbalizes/displays adequate comfort level or baseline comfort level  Outcome: Progressing     Problem: Respiratory - Adult  Goal: Normal spontaneous ventilation  11/5/2023 0437 by Ariella Kennedy RCP  Outcome: Progressing     Problem: Respiratory - Adult  Goal: Achieves optimal ventilation and oxygenation  11/5/2023 1347 by Shalonda Levine RN  Outcome: Progressing  Flowsheets (Taken 11/5/2023 3068)  Achieves optimal ventilation and oxygenation:   Assess for changes in respiratory status   Assess for changes in status  Outcome: Not Progressing  Flowsheets (Taken 11/5/2023 7548)  Achieves stable or improved neurological status:   Assess for and report changes in neurological status   Maintain blood pressure and fluid volume within ordered parameters to optimize cerebral perfusion and minimize risk of hemorrhage  Care plan reviewed with family, family  verbalizes understanding of the plan of care and contribute to goal setting.  Patient unable to participate due to condition

## 2023-11-06 LAB
ANION GAP SERPL CALC-SCNC: 9 MEQ/L (ref 8–16)
BUN SERPL-MCNC: 28 MG/DL (ref 7–22)
CALCIUM SERPL-MCNC: 8.9 MG/DL (ref 8.5–10.5)
CHLORIDE SERPL-SCNC: 102 MEQ/L (ref 98–111)
CO2 SERPL-SCNC: 29 MEQ/L (ref 23–33)
CREAT SERPL-MCNC: 0.6 MG/DL (ref 0.4–1.2)
DEPRECATED RDW RBC AUTO: 55.2 FL (ref 35–45)
ERYTHROCYTE [DISTWIDTH] IN BLOOD BY AUTOMATED COUNT: 16 % (ref 11.5–14.5)
GFR SERPL CREATININE-BSD FRML MDRD: > 60 ML/MIN/1.73M2
GLUCOSE BLD STRIP.AUTO-MCNC: 133 MG/DL (ref 70–108)
GLUCOSE BLD STRIP.AUTO-MCNC: 138 MG/DL (ref 70–108)
GLUCOSE BLD STRIP.AUTO-MCNC: 141 MG/DL (ref 70–108)
GLUCOSE BLD STRIP.AUTO-MCNC: 156 MG/DL (ref 70–108)
GLUCOSE BLD STRIP.AUTO-MCNC: 166 MG/DL (ref 70–108)
GLUCOSE BLD STRIP.AUTO-MCNC: 183 MG/DL (ref 70–108)
GLUCOSE SERPL-MCNC: 126 MG/DL (ref 70–108)
HCT VFR BLD AUTO: 25.3 % (ref 37–47)
HGB BLD-MCNC: 7.9 GM/DL (ref 12–16)
MCH RBC QN AUTO: 29.7 PG (ref 26–33)
MCHC RBC AUTO-ENTMCNC: 31.2 GM/DL (ref 32.2–35.5)
MCV RBC AUTO: 95.1 FL (ref 81–99)
PLATELET # BLD AUTO: 256 THOU/MM3 (ref 130–400)
PMV BLD AUTO: 9.7 FL (ref 9.4–12.4)
POTASSIUM SERPL-SCNC: 3.9 MEQ/L (ref 3.5–5.2)
RBC # BLD AUTO: 2.66 MILL/MM3 (ref 4.2–5.4)
SODIUM SERPL-SCNC: 140 MEQ/L (ref 135–145)
WBC # BLD AUTO: 5.2 THOU/MM3 (ref 4.8–10.8)

## 2023-11-06 PROCEDURE — 36415 COLL VENOUS BLD VENIPUNCTURE: CPT

## 2023-11-06 PROCEDURE — 80048 BASIC METABOLIC PNL TOTAL CA: CPT

## 2023-11-06 PROCEDURE — 6360000002 HC RX W HCPCS: Performed by: INTERNAL MEDICINE

## 2023-11-06 PROCEDURE — 6370000000 HC RX 637 (ALT 250 FOR IP): Performed by: EMERGENCY MEDICINE

## 2023-11-06 PROCEDURE — 99291 CRITICAL CARE FIRST HOUR: CPT | Performed by: INTERNAL MEDICINE

## 2023-11-06 PROCEDURE — 2580000003 HC RX 258: Performed by: EMERGENCY MEDICINE

## 2023-11-06 PROCEDURE — 99024 POSTOP FOLLOW-UP VISIT: CPT | Performed by: REGISTERED NURSE

## 2023-11-06 PROCEDURE — 85027 COMPLETE CBC AUTOMATED: CPT

## 2023-11-06 PROCEDURE — 2000000000 HC ICU R&B

## 2023-11-06 PROCEDURE — 2500000003 HC RX 250 WO HCPCS

## 2023-11-06 PROCEDURE — 6360000002 HC RX W HCPCS: Performed by: EMERGENCY MEDICINE

## 2023-11-06 PROCEDURE — 6370000000 HC RX 637 (ALT 250 FOR IP): Performed by: NURSE PRACTITIONER

## 2023-11-06 PROCEDURE — 2700000000 HC OXYGEN THERAPY PER DAY

## 2023-11-06 PROCEDURE — 94761 N-INVAS EAR/PLS OXIMETRY MLT: CPT

## 2023-11-06 PROCEDURE — 2580000003 HC RX 258: Performed by: NURSE PRACTITIONER

## 2023-11-06 PROCEDURE — 82948 REAGENT STRIP/BLOOD GLUCOSE: CPT

## 2023-11-06 RX ORDER — POLYETHYLENE GLYCOL 3350 17 G/17G
17 POWDER, FOR SOLUTION ORAL DAILY
Status: DISCONTINUED | OUTPATIENT
Start: 2023-11-06 | End: 2023-11-09

## 2023-11-06 RX ORDER — SENNOSIDES A AND B 8.6 MG/1
1 TABLET, FILM COATED ORAL NIGHTLY
Status: DISCONTINUED | OUTPATIENT
Start: 2023-11-06 | End: 2023-11-07

## 2023-11-06 RX ADMIN — SENNOSIDES 8.6 MG: 8.6 TABLET, FILM COATED ORAL at 19:49

## 2023-11-06 RX ADMIN — CHLORHEXIDINE GLUCONATE 0.12% ORAL RINSE 15 ML: 1.2 LIQUID ORAL at 09:09

## 2023-11-06 RX ADMIN — ENOXAPARIN SODIUM 90 MG: 100 INJECTION SUBCUTANEOUS at 08:58

## 2023-11-06 RX ADMIN — BUMETANIDE 1 MG: 0.25 INJECTION INTRAMUSCULAR; INTRAVENOUS at 08:58

## 2023-11-06 RX ADMIN — MODAFINIL 200 MG: 100 TABLET ORAL at 08:58

## 2023-11-06 RX ADMIN — MICONAZOLE NITRATE: 2 POWDER TOPICAL at 19:50

## 2023-11-06 RX ADMIN — FAMOTIDINE 20 MG: 20 TABLET ORAL at 19:49

## 2023-11-06 RX ADMIN — LEVETIRACETAM 1000 MG: 100 INJECTION, SOLUTION INTRAVENOUS at 06:20

## 2023-11-06 RX ADMIN — CHLORHEXIDINE GLUCONATE 0.12% ORAL RINSE 15 ML: 1.2 LIQUID ORAL at 19:49

## 2023-11-06 RX ADMIN — LEVETIRACETAM 1000 MG: 100 INJECTION, SOLUTION INTRAVENOUS at 17:52

## 2023-11-06 RX ADMIN — BUMETANIDE 1 MG: 0.25 INJECTION INTRAMUSCULAR; INTRAVENOUS at 19:49

## 2023-11-06 RX ADMIN — SODIUM CHLORIDE, PRESERVATIVE FREE 10 ML: 5 INJECTION INTRAVENOUS at 08:59

## 2023-11-06 RX ADMIN — FAMOTIDINE 20 MG: 20 TABLET ORAL at 08:58

## 2023-11-06 RX ADMIN — MICONAZOLE NITRATE: 2 POWDER TOPICAL at 09:03

## 2023-11-06 RX ADMIN — INSULIN GLARGINE 18 UNITS: 100 INJECTION, SOLUTION SUBCUTANEOUS at 19:49

## 2023-11-06 RX ADMIN — ENOXAPARIN SODIUM 90 MG: 100 INJECTION SUBCUTANEOUS at 19:49

## 2023-11-06 RX ADMIN — POLYETHYLENE GLYCOL 3350 17 G: 17 POWDER, FOR SOLUTION ORAL at 15:02

## 2023-11-06 RX ADMIN — SODIUM CHLORIDE, PRESERVATIVE FREE 10 ML: 5 INJECTION INTRAVENOUS at 19:49

## 2023-11-06 ASSESSMENT — PULMONARY FUNCTION TESTS
PIF_VALUE: 14
PIF_VALUE: 13
PIF_VALUE: 13

## 2023-11-06 ASSESSMENT — PAIN SCALES - GENERAL: PAINLEVEL_OUTOF10: 0

## 2023-11-06 NOTE — PROGRESS NOTES
Comprehensive Nutrition Assessment    Type and Reason for Visit:  Reassess (Tube Feed Monitor)    Nutrition Recommendations/Plan:   Continue current tube feed regimen: Vital 1.2 bolus 215 ml every 4 hours to better meet nutritional needs. Free water flush 30 ml before and after each bolus (or per provider). No BM in 10d - RN gave prn glycolax this am - spoke with PharmD and starting scheduled glycolax and senna today     Malnutrition Assessment:  Malnutrition Status:  Insufficient data (10/13/23 1128)    Context:  Chronic Illness     Findings of the 6 clinical characteristics of malnutrition:  Energy Intake:  Unable to assess (pt. intubated)  Weight Loss:  Greater than 10% over 6 months (31# or 16% in 5 months)     Body Fat Loss:  Unable to assess (facial edema; RN asked not to disturb pt. this am)     Muscle Mass Loss:  No significant muscle mass loss Temples (temporalis), Clavicles (pectoralis & deltoids)  Fluid Accumulation:  Mild     Strength:  Not Performed    Nutrition Assessment:     Pt improving from a nutritional standpoint AEB pt NPO, intubated and is tolerating Vital 1.2 bolus tube feed at goal. 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 Nutrition Related Findings:    Pt. Report/Treatments/Miscellaneous: pt. Seen; tolerating TF; bolus feeds per ENT service; UO 2625ml; intubated; spoke with RN and PharmD; per discussion on rounds ?  Trach with ENT depending on neuro recovery; MDs talking with  re: POC  GI Status: last BM x1 10/28  Pertinent Labs: glucose 138  BUN 28  creatinine 0.6  Na 140  K+ 3.9  Pertinent Meds: bumex, lantus, keppra    Wound Type: Pressure Injury, Stage II (coccyx; stage 1

## 2023-11-06 NOTE — CARE COORDINATION
11/6/23, 3:26 PM EST    DISCHARGE ON GOING EVALUATION    Nellie Barahona 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 There is also nonocclusive thrombus within the left posterior tibial vein  10/16 Extubated to bipap  10/16 Code Blue  10/16 Reintubated  10/16 Bronch: thick bloody secretions in RLL removed  10/16 CXR: Mild stable cardiomegaly with pulmonary vascular congestion suspicious for congestive heart failure; Prominent pulmonary interstitium suspicious for pulmonary edema  10/17 5 hr EEG: No seizures noted; diffuse background attenuation and suppression without evidence of reactivity; severe encephalopathy  10/17 CT Head: Stable CT appearance the brain. No acute findings  10/17 MRI Brain: No acute findings  44/08 PICC left basilic  66/31 CT Head: No acute findings  10/18 OR: ET Exchange bronhoscopy and lavage  10/19 4hr 40 min EEG: This EEG was abnormal. The background abnormalities indicated a moderate to severe encephalopathy, nonspecific to etiology. The generalized periodic discharges (GPDs) indicated underlying cortical irritability/increased risk of seizures, and can be seen in various encephalopathies. No seizures were recorded  10/19 MRI Brain: No evidence of an acute infarct. No evidence of anoxia; Stable mild severity chronic small vessel ischemic changes; Global volume loss  10/23 4-day EEG: Moderate to severe encephalopathy, improved as recording progressed.  Frequent and at times near continuous runs of GPDs with often typical and at times atypical triphasic morphology, activated with stimulation at the beginning of recording, consistent with SIRPIDs   10/23 BUE Venous Dopplers: There is noncompressibility and absent flow, likely secondary to acute thrombus, in the left axillary, brachial, basilic and cephalic veins. There is partial flow and compressibility in the left subclavian vein. There is noncompressibility and absent flow in the right cephalic and antecubital veins. Remaining vein segments demonstrate normal compressibility and flow  11/2 BUE Venous dopplers: There is acute appearing DVT in the left axillary vein; Chronic appearing DVT is present in the distal left subclavian vein; Superficial thrombophlebitis involving the left basilic vein; Overall this examination has improved since the prior exam dated 10/23/2023    Barriers to Discharge: RACHEL drain removed on 11/4. No BM in 10 days. Started scheduled glycolax and senna. Remains on vent w/ETT on SBT, FIO2 25%, sats 97%. Afebrile. NSR. Unable to follow commands, CORTEZ x4 to painful stim +cough, no gag. Intensivist, Neurology, and ENT following. Dietitian and Wound Care following. Telemetry, PICC, NG w/bolus TF, werner, SCDs. IV bumex 1 mg bid, Peridex, lovenox, pepcid, lantus, SSI, IV keppra, desenex, provigil, prn fibercon, glycolax, scopolamine patch, senna, Electrolyte replacement protocols. Hgb 7.9. PCP: Jazzy Sidhu MD  Readmission Risk Score: 20.3%  Patient Goals/Plan/Treatment Preferences: From home w/. Plan pending clinical course.

## 2023-11-06 NOTE — PROGRESS NOTES
CRITICAL CARE PROGRESS NOTE      Patient:  Zak Hoganhal    Unit/Bed:4D-08/008-A  YOB: 1950  MRN: [id number]   PCP: Zhen Kiran MD  Date of Admission: 10/12/2023  Chief Complaint:- tracheal stenosis s/p resection     Assessment and Plan:      Anoxic Brain Injury 2/2 respiratory arrest and subsequent cardiac arrest following failed extubation 10/16  EEG: No seizures, findings consistent with encephalopathy (mod-severe). Pt has cough and gag, extends to pain but not following commands; positive Babinski sign. MRI Brain 10/19: No evidence of anoxia, no evidence of acute infarct. Provigil 200mg to promote wakefulness  Keppra 1000 mg IV QD - no seizure activity noted on EEGs, will consider setting stop date. Continue to monitor. Hypoxic respiratory failure:  Patient is vent-dependent without sedation. SBT - no cuff leak, barrier to extubation = GCS 4  s/p cricotracheal resection, laryngoplasty with flap and vocal fold lateralization stitch placement with Dr. Mary Ellen Blankenship 10/12  HOB at 30 degrees with pillow under head for semi-flexed position per ENT  Left RACHEL drain:  5 cc serosanguinous drainage overnight. Right RACHEL drain removed by ENT 10/27  10/18: 5.0 ET tube exchanged for 7.0 ET tube by Dr. Mary Ellen Blankenship     Bilateral DVTs, superficial:    Venous Duplex B/L Lower Extremity 10/12: thrombus in BL posterior tibial veins and right peroneal vein   Venous Duplex Upper Extremities 10/23: Left axillary, brachial, basilic and cephalic vein thrombus. Right cephalic and antecubital thrombosis   Lovenox resumed 10/28  Repeat V Dup B/L UE did not fine RUE DVTs, CHIRAG still has several clots and superficial thrombophlebitis in the L basilic vein but overall improved.      DM Type 2:  01/2023 HbA1c: 5.5   Patient on 18 units Lantus nightly   High dose SSI     Chronic diastolic CHF:  History   ECHO 1/2/23: EF 60-65%  Strict I/O   Monitor for fluid overload  Bumex 0.5 BID    Leukocytosis:   Peak 17.2

## 2023-11-06 NOTE — PLAN OF CARE
Patient Weaning Progress    The patient's vent settings was able to be weaned this shift. Ventilator settings that were weaned              [] Mode   [] Pressure support weaned   [] Fio2 weaned   [] Peep weaned      Spontaneous weaning trial  was attempted. due to defined parameters for SBT (spontaneous breathing trial) not being met. *Results of SBT documented under SBTOUTCOME note. Reason that defined ventilator parameters for SBT was not met              [] Patient condition requires increased ventilator settings  [] Requires increased sedation   [] Settings not within weaning range   [] SAT not completed   [] Physician orders    The patient was able to tolerate SBT. Evac tube was not  hooked up with continuous low suction(20-30mmHg)          Unable to get agreement for goals because no family is present and patient cannot respond.       Problem: Discharge Planning  Goal: Discharge to home or other facility with appropriate resources  11/6/2023 0657 by Cooper Colon RN  Outcome: Not Progressing  Flowsheets (Taken 11/5/2023 0734 by Lena Osorio, RN)  Discharge to home or other facility with appropriate resources:   Identify barriers to discharge with patient and caregiver   Arrange for needed discharge resources and transportation as appropriate   Identify discharge learning needs (meds, wound care, etc)   Refer to discharge planning if patient needs post-hospital services based on physician order or complex needs related to functional status, cognitive ability or social support system     Problem: Neurosensory - Adult  Goal: Achieves stable or improved neurological status  11/6/2023 0657 by Cooper Colon RN  Outcome: Not Progressing  Flowsheets (Taken 11/5/2023 3154 by Lena Osorio, RN)  Achieves stable or improved neurological status:   Assess for and report changes in neurological status   Maintain blood pressure and fluid volume within ordered parameters to optimize cerebral perfusion and minimize risk of hemorrhage   Monitor temperature, glucose, and sodium.  Initiate appropriate interventions as ordered

## 2023-11-06 NOTE — PROGRESS NOTES
[facility]--. [id number] [facility] PROGRESS NOTE      Patient: Martha Fitting  Room #: 4D-08/008-A            YOB: 1950  Age: 81 y.o. Gender: female            Admit Date & Time: 10/12/2023  6:19 AM    Assessment:    The patient was asleep at the time of this attempted visit. Interventions: The patient was provided silent prayer. Outcomes: The patient remains sleeping at the end of the visit. Plan: 1. Spiritual care will continue to follow the patient according to [facility]'s spiritual care SOP.        Electronically signed by Nidia Jackson on 11/6/2023 at 10:11 AM.  [place]  [phone]     11/06/23 1011   Encounter Summary   Encounter Overview/Reason  Follow-up   Service Provided For: Patient   Referral/Consult From: [address] Family members   Last Encounter  11/06/23  (NR)   Complexity of Encounter Low   Begin Time 0733   End Time  0734   Total Time Calculated 1 min   Spiritual/Emotional needs   Type Spiritual Support   Assessment/Intervention/Outcome   Assessment Unable to assess   Intervention Prayer (assurance of)/[place]   Outcome Did not respond

## 2023-11-06 NOTE — PLAN OF CARE
Problem: Respiratory - Adult  Goal: Normal spontaneous ventilation  Outcome: Progressing     Problem: Respiratory - Adult  Goal: Achieves optimal ventilation and oxygenation  Outcome: Progressing                                                  Patient Weaning Progress    The patient's vent settings was able to be weaned this shift. Ventilator settings that were weaned              [x] Mode   [] Pressure support weaned   [] Fio2 weaned   [] Peep weaned      Spontaneous weaning trial  was attempted. *Results of SBT documented under SBTOUTCOME note. Reason that defined ventilator parameters for SBT was not met              [] Patient condition requires increased ventilator settings  [] Requires increased sedation   [] Settings not within weaning range   [] SAT not completed   [] Physician orders    The patient was able to tolerate SBT. RSBI  was 25 with EtCO2 of 39 and SpO2 of 96 on 25% FiO2. Spontanteous VT was 593 and RR 15 breaths/min. Evac tube was not  hooked up with continuous low suction(20-30mmHg)      Cuff was not  deflated to determine cuff leak. Unable to get agreement for goals because no family is present and patient cannot respond.

## 2023-11-06 NOTE — PLAN OF CARE
Problem: Discharge Planning  Goal: Discharge to home or other facility with appropriate resources  Outcome: Not Progressing  Flowsheets (Taken 11/5/2023 0745 by Srinivas Patrick, YVETTE)  Discharge to home or other facility with appropriate resources:   Identify barriers to discharge with patient and caregiver   Arrange for needed discharge resources and transportation as appropriate   Identify discharge learning needs (meds, wound care, etc)   Refer to discharge planning if patient needs post-hospital services based on physician order or complex needs related to functional status, cognitive ability or social support system     Problem: Neurosensory - Adult  Goal: Achieves stable or improved neurological status  Outcome: Not Progressing  Flowsheets (Taken 11/5/2023 0754 by Srinivas Patrick RN)  Achieves stable or improved neurological status:   Assess for and report changes in neurological status   Maintain blood pressure and fluid volume within ordered parameters to optimize cerebral perfusion and minimize risk of hemorrhage   Monitor temperature, glucose, and sodium.  Initiate appropriate interventions as ordered     Problem: Respiratory - Adult  Goal: Achieves optimal ventilation and oxygenation  11/6/2023 0657 by Jesús Grant RN  Outcome: Progressing  Flowsheets (Taken 11/5/2023 0754 by Srinivas Patrick RN)  Achieves optimal ventilation and oxygenation:   Assess for changes in respiratory status   Assess for changes in mentation and behavior   Position to facilitate oxygenation and minimize respiratory effort   Oxygen supplementation based on oxygen saturation or arterial blood gases   Encourage broncho-pulmonary hygiene including cough, deep breathe, incentive spirometry   Assess the need for suctioning and aspirate as needed   Respiratory therapy support as indicated     Problem: Nutrition Deficit:  Goal: Optimize nutritional status  Outcome: Progressing  Flowsheets (Taken 11/2/2023 0932 by

## 2023-11-07 LAB
ANION GAP SERPL CALC-SCNC: 7 MEQ/L (ref 8–16)
BUN SERPL-MCNC: 29 MG/DL (ref 7–22)
CALCIUM SERPL-MCNC: 9 MG/DL (ref 8.5–10.5)
CHLORIDE SERPL-SCNC: 103 MEQ/L (ref 98–111)
CO2 SERPL-SCNC: 30 MEQ/L (ref 23–33)
CREAT SERPL-MCNC: 0.6 MG/DL (ref 0.4–1.2)
DEPRECATED RDW RBC AUTO: 54.4 FL (ref 35–45)
ERYTHROCYTE [DISTWIDTH] IN BLOOD BY AUTOMATED COUNT: 15.9 % (ref 11.5–14.5)
GFR SERPL CREATININE-BSD FRML MDRD: > 60 ML/MIN/1.73M2
GLUCOSE BLD STRIP.AUTO-MCNC: 178 MG/DL (ref 70–108)
GLUCOSE BLD STRIP.AUTO-MCNC: 188 MG/DL (ref 70–108)
GLUCOSE BLD STRIP.AUTO-MCNC: 195 MG/DL (ref 70–108)
GLUCOSE BLD STRIP.AUTO-MCNC: 202 MG/DL (ref 70–108)
GLUCOSE SERPL-MCNC: 183 MG/DL (ref 70–108)
HCT VFR BLD AUTO: 25.5 % (ref 37–47)
HGB BLD-MCNC: 8 GM/DL (ref 12–16)
MCH RBC QN AUTO: 29.7 PG (ref 26–33)
MCHC RBC AUTO-ENTMCNC: 31.4 GM/DL (ref 32.2–35.5)
MCV RBC AUTO: 94.8 FL (ref 81–99)
PLATELET # BLD AUTO: 234 THOU/MM3 (ref 130–400)
PMV BLD AUTO: 9.6 FL (ref 9.4–12.4)
POTASSIUM SERPL-SCNC: 4.4 MEQ/L (ref 3.5–5.2)
RBC # BLD AUTO: 2.69 MILL/MM3 (ref 4.2–5.4)
SODIUM SERPL-SCNC: 140 MEQ/L (ref 135–145)
WBC # BLD AUTO: 6.8 THOU/MM3 (ref 4.8–10.8)

## 2023-11-07 PROCEDURE — 2000000000 HC ICU R&B

## 2023-11-07 PROCEDURE — 6370000000 HC RX 637 (ALT 250 FOR IP): Performed by: INTERNAL MEDICINE

## 2023-11-07 PROCEDURE — 99291 CRITICAL CARE FIRST HOUR: CPT | Performed by: INTERNAL MEDICINE

## 2023-11-07 PROCEDURE — 6370000000 HC RX 637 (ALT 250 FOR IP): Performed by: NURSE PRACTITIONER

## 2023-11-07 PROCEDURE — 2580000003 HC RX 258: Performed by: EMERGENCY MEDICINE

## 2023-11-07 PROCEDURE — 2580000003 HC RX 258: Performed by: NURSE PRACTITIONER

## 2023-11-07 PROCEDURE — 85027 COMPLETE CBC AUTOMATED: CPT

## 2023-11-07 PROCEDURE — 82948 REAGENT STRIP/BLOOD GLUCOSE: CPT

## 2023-11-07 PROCEDURE — 94003 VENT MGMT INPAT SUBQ DAY: CPT

## 2023-11-07 PROCEDURE — 36415 COLL VENOUS BLD VENIPUNCTURE: CPT

## 2023-11-07 PROCEDURE — 6360000002 HC RX W HCPCS: Performed by: EMERGENCY MEDICINE

## 2023-11-07 PROCEDURE — 6360000002 HC RX W HCPCS: Performed by: INTERNAL MEDICINE

## 2023-11-07 PROCEDURE — 94761 N-INVAS EAR/PLS OXIMETRY MLT: CPT

## 2023-11-07 PROCEDURE — 99024 POSTOP FOLLOW-UP VISIT: CPT | Performed by: REGISTERED NURSE

## 2023-11-07 PROCEDURE — 2500000003 HC RX 250 WO HCPCS: Performed by: EMERGENCY MEDICINE

## 2023-11-07 PROCEDURE — 2700000000 HC OXYGEN THERAPY PER DAY

## 2023-11-07 PROCEDURE — 6370000000 HC RX 637 (ALT 250 FOR IP): Performed by: EMERGENCY MEDICINE

## 2023-11-07 PROCEDURE — 80048 BASIC METABOLIC PNL TOTAL CA: CPT

## 2023-11-07 PROCEDURE — 2500000003 HC RX 250 WO HCPCS

## 2023-11-07 RX ORDER — SENNOSIDES A AND B 8.6 MG/1
2 TABLET, FILM COATED ORAL NIGHTLY
Status: DISCONTINUED | OUTPATIENT
Start: 2023-11-07 | End: 2023-11-13

## 2023-11-07 RX ORDER — FOLIC ACID 5 MG/ML
1 INJECTION, SOLUTION INTRAMUSCULAR; INTRAVENOUS; SUBCUTANEOUS DAILY
Status: DISCONTINUED | OUTPATIENT
Start: 2023-11-07 | End: 2023-11-07 | Stop reason: ALTCHOICE

## 2023-11-07 RX ORDER — INSULIN GLARGINE 100 [IU]/ML
20 INJECTION, SOLUTION SUBCUTANEOUS NIGHTLY
Status: DISCONTINUED | OUTPATIENT
Start: 2023-11-07 | End: 2023-11-14

## 2023-11-07 RX ADMIN — ENOXAPARIN SODIUM 90 MG: 100 INJECTION SUBCUTANEOUS at 20:52

## 2023-11-07 RX ADMIN — ENOXAPARIN SODIUM 90 MG: 100 INJECTION SUBCUTANEOUS at 08:05

## 2023-11-07 RX ADMIN — BUMETANIDE 1 MG: 0.25 INJECTION INTRAMUSCULAR; INTRAVENOUS at 21:03

## 2023-11-07 RX ADMIN — SODIUM CHLORIDE, PRESERVATIVE FREE 10 ML: 5 INJECTION INTRAVENOUS at 20:54

## 2023-11-07 RX ADMIN — SENNOSIDES 17.2 MG: 8.6 TABLET, FILM COATED ORAL at 20:52

## 2023-11-07 RX ADMIN — SODIUM CHLORIDE, PRESERVATIVE FREE 10 ML: 5 INJECTION INTRAVENOUS at 08:06

## 2023-11-07 RX ADMIN — MODAFINIL 200 MG: 100 TABLET ORAL at 08:05

## 2023-11-07 RX ADMIN — INSULIN GLARGINE 20 UNITS: 100 INJECTION, SOLUTION SUBCUTANEOUS at 20:52

## 2023-11-07 RX ADMIN — LEVETIRACETAM 1000 MG: 100 INJECTION, SOLUTION INTRAVENOUS at 17:18

## 2023-11-07 RX ADMIN — LEVETIRACETAM 1000 MG: 100 INJECTION, SOLUTION INTRAVENOUS at 06:11

## 2023-11-07 RX ADMIN — CHLORHEXIDINE GLUCONATE 0.12% ORAL RINSE 15 ML: 1.2 LIQUID ORAL at 08:05

## 2023-11-07 RX ADMIN — FAMOTIDINE 20 MG: 20 TABLET ORAL at 08:05

## 2023-11-07 RX ADMIN — FOLIC ACID 1 MG: 5 INJECTION, SOLUTION INTRAMUSCULAR; INTRAVENOUS; SUBCUTANEOUS at 10:53

## 2023-11-07 RX ADMIN — MICONAZOLE NITRATE: 2 POWDER TOPICAL at 08:06

## 2023-11-07 RX ADMIN — MICONAZOLE NITRATE: 2 POWDER TOPICAL at 20:56

## 2023-11-07 RX ADMIN — POLYETHYLENE GLYCOL 3350 17 G: 17 POWDER, FOR SOLUTION ORAL at 08:05

## 2023-11-07 RX ADMIN — INSULIN LISPRO 4 UNITS: 100 INJECTION, SOLUTION INTRAVENOUS; SUBCUTANEOUS at 11:00

## 2023-11-07 RX ADMIN — FAMOTIDINE 20 MG: 20 TABLET ORAL at 20:52

## 2023-11-07 RX ADMIN — BUMETANIDE 1 MG: 0.25 INJECTION INTRAMUSCULAR; INTRAVENOUS at 08:06

## 2023-11-07 ASSESSMENT — PULMONARY FUNCTION TESTS
PIF_VALUE: 15
PIF_VALUE: 13
PIF_VALUE: 12
PIF_VALUE: 12

## 2023-11-07 NOTE — CONSULTS
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 Patient is intubated lying in bed with her  Raffy Dobbins sitting in the recliner chair next to the bed. Patient is intubated and none responsive. During this visit, Raffy Dobbins shared with me that he and the children are aware of patient Jori Dow)  Estephania Beth also shared with me that he got a update the doctor. Raffy Dobbins informed me that patient has not improved for the past few days. He said he and the children are hopeful but they are aware that patient is critical. Raffy Dobbins informed me that he appreciate the fact that Dr. Joya Britt has been very candid and truthful on updating him on patient's condition. He told me that Dr. Joya Britt said he will continue to provide necessary care and observe how patient will do this week and through next week if there will be any improvement. After that, he will update the family and hear what they would like to do next. Raffy Dobbins shared with me that he and his children will be able to make some decision at that time. Raffy Dobbins told me that he and the patient decided not to have ACP document few years ago, but as he see what his wife is going through, he is planning to complete one for himself so his children don't have to figure out what to do if he is admitted in the hospital in the future. He will let Spiritual Care know when he is ready. He also shared with me that he keep his wife Jori Dow as a Full Code at this time.    Raffy Dobbins appreciate the visit and support he is

## 2023-11-07 NOTE — PROGRESS NOTES
CRITICAL CARE PROGRESS NOTE      [facility]  [facility]    Unit/Bed:4D-08/008-A  YOB: 1950  MRN: [id number]   PCP: Cirilo Vidal MD  Date of Admission: 10/12/2023  Chief Complaint:- tracheal stenosis s/p resection     Assessment and Plan:      Anoxic Brain Injury 2/2 respiratory arrest and subsequent cardiac arrest following failed extubation 10/16  EEG: No seizures, findings consistent with encephalopathy (mod-severe). Pt has cough and gag, extends to pain but not following commands; positive Babinski sign. MRI Brain 10/19: No evidence of anoxia, no evidence of acute infarct. Provigil 200mg to promote wakefulness  Keppra 1000 mg IV QD - no seizure activity noted on EEGs, will consider setting stop date. Continue to monitor. Hypoxic respiratory failure:  Patient is minimally vent-dependent without sedation. barrier to extubation = GCS 4  s/p cricotracheal resection, laryngoplasty with flap and vocal fold lateralization stitch placement with Dr. Mei Gay 10/12  HOB at 30 degrees with pillow under head for semi-flexed position per ENT  Left RACHEL drain:  minimal drainage. Right RACHEL drain removed by ENT 10/27  Contact ENT regarding airway plans - trach vs extubation. Monitor for PNA. Bilateral DVTs, superficial:    Venous Duplex B/L Lower Extremity 10/12: thrombus in BL posterior tibial veins and right peroneal vein   Venous Duplex Upper Extremities 10/23: Left axillary, brachial, basilic and cephalic vein thrombus. Right cephalic and antecubital thrombosis   Lovenox resumed 10/28  Repeat V Dup B/L UE did not fine RUE DVTs, LUE still has several clots and superficial thrombophlebitis in the L basilic vein but overall improved. DM Type 2:  01/2023 HbA1c: 5.5   Patient on 18 units Lantus nightly   High dose SSI     Chronic diastolic CHF:  History   ECHO 1/2/23: EF 60-65%  Strict I/O   Monitor for fluid overload - Patient is up 17 pounds since admission.    Good UOP  Bumex 0. 5 BID - consider increase. Leukocytosis:   Peak 17.2 on 10/24. Resolved. WBCs uptrending - monitor for fever, CXR to look for PNA. Patient has remained afebrile     Anemia: s/p 1 U PRBC 10/26  Hgb: stable   No signs of active bleeding, no increase in output from drains, no blood in stool  Continue monitoring. B12 1179, folate 4.8 (minimal) - add folate supplement. Pt has history of folate deficiency. Diet:  NPO  Tube feeds - peptide based 215 mL bolus 6 times daily. FW flush 30 mL before and after bolus. HTN: resume home meds as appropriate. Dispo: LTAC? INITIAL H AND P AND ICU COURSE:  82 yo F in ICU for close airway monitoring. S/p segmental cricotracheal resection, laryngoplasty with vocal fold lateralization w/Dr. Shelia Wilcox. 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 10/18: 5.0 ET tube exchanged for 7.0 ET tube by Dr. Shelia Wilcox. Past Medical History: Arthritis, CAD, diabetic neuropathy, DVT, hyperlipidemia, hypertension, type 2 diabetes. Family History: Father: Parkinson's disease, lung cancer. Social History: no smoking history, no recent alcohol use. ROS: Unable to be obtained due to patient being intubated.       Scheduled Meds:   polyethylene glycol  17 g Oral Daily    senna  1 tablet Oral Nightly    bumetanide  1 mg IntraVENous BID    insulin glargine  18 Units SubCUTAneous Nightly    enoxaparin  1 mg/kg SubCUTAneous Q12H    miconazole   Topical BID    insulin lispro  0-16 Units SubCUTAneous Q6H    [Held by provider] carvedilol  6.25 mg Orogastric BID WC    [Held by provider] amLODIPine  5 mg Orogastric Daily    famotidine  20 mg Oral BID    modafinil  200 mg Oral Daily    scopolamine  1 patch TransDERmal Q72H    levETIRAcetam  1,000 mg IntraVENous Q12H    sodium chloride flush  5-40 mL IntraVENous 2 times per day    chlorhexidine  15 mL Mouth/Throat BID

## 2023-11-07 NOTE — PLAN OF CARE
Problem: Respiratory - Adult  Goal: Achieves optimal ventilation and oxygenation  Outcome: Progressing  Flowsheets (Taken 11/5/2023 0754 by Tj Pace, YVETTE)  Achieves optimal ventilation and oxygenation:   Assess for changes in respiratory status   Assess for changes in mentation and behavior   Position to facilitate oxygenation and minimize respiratory effort   Oxygen supplementation based on oxygen saturation or arterial blood gases   Encourage broncho-pulmonary hygiene including cough, deep breathe, incentive spirometry   Assess the need for suctioning and aspirate as needed   Respiratory therapy support as indicated     Problem: Nutrition Deficit:  Goal: Optimize nutritional status  Outcome: Progressing  Flowsheets (Taken 11/6/2023 1411 by Joanie Weber, RD, LD)  Nutrient intake appropriate for improving, restoring, or maintaining nutritional needs: Assess nutritional status and recommend course of action     Problem: Skin/Tissue Integrity - Adult  Goal: Incisions, wounds, or drain sites healing without S/S of infection  Outcome: Progressing  Flowsheets (Taken 11/5/2023 1346 by jT Pace RN)  Incisions, Wounds, or Drain Sites Healing Without Sign and Symptoms of Infection: TWICE DAILY: Assess and document dressing/incision, wound bed, drain sites and surrounding tissue     Problem: Gastrointestinal - Adult  Goal: Maintains adequate nutritional intake  Outcome: Progressing  Flowsheets (Taken 11/4/2023 2003 by Josette York RN)  Maintains adequate nutritional intake: Monitor intake and output, weight and lab values     Problem: Genitourinary - Adult  Goal: Urinary catheter remains patent  Outcome: Progressing  Flowsheets (Taken 11/5/2023 0745 by Tj Pace, YVETTE)  Urinary catheter remains patent: Assess patency of urinary catheter     Problem: Cardiovascular - Adult  Goal: Maintains optimal cardiac output and hemodynamic stability  Outcome: Progressing  Flowsheets (Taken

## 2023-11-07 NOTE — PLAN OF CARE
Problem: Respiratory - Adult  Goal: Normal spontaneous ventilation  Outcome: Progressing                                                Patient Weaning Progress    The patient's vent settings was able to be weaned this shift. Ventilator settings that were weaned              [x] Mode   [] Pressure support weaned   [] Fio2 weaned   [] Peep weaned      Spontaneous weaning trial  was attempted. due to defined parameters for SBT (spontaneous breathing trial) not being met. *Results of SBT documented under SBTOUTCOME note. Reason that defined ventilator parameters for SBT was not met              [] Patient condition requires increased ventilator settings  [] Requires increased sedation   [] Settings not within weaning range   [] SAT not completed   [] Physician orders    The patient was able to tolerate SBT. RSBI  was 32 with EtCO2 of 42 and SpO2 of 99 on 25% FiO2. Spontanteous VT was 461 and RR 14 breaths/min. The patient was on SBT for 5 minutes and continues to tolerate. Evac tube was not  hooked up with continuous low suction(20-30mmHg)      Cuff was not  deflated to determine cuff leak. Unable to get agreement for goals because no family is present and patient cannot respond.

## 2023-11-07 NOTE — PLAN OF CARE
Patient Weaning Progress    The patient's vent settings was able to be weaned this shift. Ventilator settings that were weaned              [] Mode   [] Pressure support weaned   [x] Fio2 weaned   [] Peep weaned      Spontaneous weaning trial  was attempted. due to defined parameters for SBT (spontaneous breathing trial) not being met. *Results of SBT documented under SBTOUTCOME note. Reason that defined ventilator parameters for SBT was not met              [] Patient condition requires increased ventilator settings  [] Requires increased sedation   [] Settings not within weaning range   [] SAT not completed   [] Physician orders      Evac tube was  hooked up with continuous low suction(20-30mmHg)              Unable to get agreement for goals because no family is present and patient cannot respond.       Problem: Discharge Planning  Goal: Discharge to home or other facility with appropriate resources  Recent Flowsheet Documentation  Taken 11/7/2023 0747 by Mary Bowers RN  Discharge to home or other facility with appropriate resources:   Identify barriers to discharge with patient and caregiver   Arrange for needed discharge resources and transportation as appropriate   Identify discharge learning needs (meds, wound care, etc)   Arrange for interpreters to assist at discharge as needed  11/7/2023 0602 by Anna Moctezuma RN  Outcome: Not Progressing  Flowsheets (Taken 11/5/2023 0745 by Juvenal Higgins, YVETTE)  Discharge to home or other facility with appropriate resources:   Identify barriers to discharge with patient and caregiver   Arrange for needed discharge resources and transportation as appropriate   Identify discharge learning needs (meds, wound care, etc)   Refer to discharge planning if patient needs post-hospital services based on physician order or complex needs related to functional status, cognitive ability or social support

## 2023-11-08 LAB
ANION GAP SERPL CALC-SCNC: 11 MEQ/L (ref 8–16)
BUN SERPL-MCNC: 34 MG/DL (ref 7–22)
CALCIUM SERPL-MCNC: 8.9 MG/DL (ref 8.5–10.5)
CHLORIDE SERPL-SCNC: 100 MEQ/L (ref 98–111)
CO2 SERPL-SCNC: 29 MEQ/L (ref 23–33)
CREAT SERPL-MCNC: 0.6 MG/DL (ref 0.4–1.2)
DEPRECATED RDW RBC AUTO: 55.8 FL (ref 35–45)
ERYTHROCYTE [DISTWIDTH] IN BLOOD BY AUTOMATED COUNT: 16 % (ref 11.5–14.5)
GFR SERPL CREATININE-BSD FRML MDRD: > 60 ML/MIN/1.73M2
GLUCOSE BLD STRIP.AUTO-MCNC: 152 MG/DL (ref 70–108)
GLUCOSE BLD STRIP.AUTO-MCNC: 157 MG/DL (ref 70–108)
GLUCOSE BLD STRIP.AUTO-MCNC: 168 MG/DL (ref 70–108)
GLUCOSE BLD STRIP.AUTO-MCNC: 175 MG/DL (ref 70–108)
GLUCOSE BLD STRIP.AUTO-MCNC: 189 MG/DL (ref 70–108)
GLUCOSE SERPL-MCNC: 131 MG/DL (ref 70–108)
HCT VFR BLD AUTO: 26.4 % (ref 37–47)
HGB BLD-MCNC: 8.3 GM/DL (ref 12–16)
MCH RBC QN AUTO: 30.2 PG (ref 26–33)
MCHC RBC AUTO-ENTMCNC: 31.4 GM/DL (ref 32.2–35.5)
MCV RBC AUTO: 96 FL (ref 81–99)
PLATELET # BLD AUTO: 246 THOU/MM3 (ref 130–400)
PMV BLD AUTO: 9.8 FL (ref 9.4–12.4)
POTASSIUM SERPL-SCNC: 4 MEQ/L (ref 3.5–5.2)
RBC # BLD AUTO: 2.75 MILL/MM3 (ref 4.2–5.4)
SODIUM SERPL-SCNC: 140 MEQ/L (ref 135–145)
WBC # BLD AUTO: 5.6 THOU/MM3 (ref 4.8–10.8)

## 2023-11-08 PROCEDURE — 2580000003 HC RX 258: Performed by: EMERGENCY MEDICINE

## 2023-11-08 PROCEDURE — 94761 N-INVAS EAR/PLS OXIMETRY MLT: CPT

## 2023-11-08 PROCEDURE — 6370000000 HC RX 637 (ALT 250 FOR IP): Performed by: NURSE PRACTITIONER

## 2023-11-08 PROCEDURE — 2500000003 HC RX 250 WO HCPCS

## 2023-11-08 PROCEDURE — 2000000000 HC ICU R&B

## 2023-11-08 PROCEDURE — 80048 BASIC METABOLIC PNL TOTAL CA: CPT

## 2023-11-08 PROCEDURE — 2700000000 HC OXYGEN THERAPY PER DAY

## 2023-11-08 PROCEDURE — 6360000002 HC RX W HCPCS: Performed by: INTERNAL MEDICINE

## 2023-11-08 PROCEDURE — 94003 VENT MGMT INPAT SUBQ DAY: CPT

## 2023-11-08 PROCEDURE — 82948 REAGENT STRIP/BLOOD GLUCOSE: CPT

## 2023-11-08 PROCEDURE — 2580000003 HC RX 258: Performed by: NURSE PRACTITIONER

## 2023-11-08 PROCEDURE — 36415 COLL VENOUS BLD VENIPUNCTURE: CPT

## 2023-11-08 PROCEDURE — 6370000000 HC RX 637 (ALT 250 FOR IP): Performed by: EMERGENCY MEDICINE

## 2023-11-08 PROCEDURE — 99024 POSTOP FOLLOW-UP VISIT: CPT | Performed by: REGISTERED NURSE

## 2023-11-08 PROCEDURE — 2500000003 HC RX 250 WO HCPCS: Performed by: EMERGENCY MEDICINE

## 2023-11-08 PROCEDURE — 6360000002 HC RX W HCPCS: Performed by: EMERGENCY MEDICINE

## 2023-11-08 PROCEDURE — 85027 COMPLETE CBC AUTOMATED: CPT

## 2023-11-08 PROCEDURE — 99291 CRITICAL CARE FIRST HOUR: CPT | Performed by: INTERNAL MEDICINE

## 2023-11-08 RX ADMIN — ENOXAPARIN SODIUM 90 MG: 100 INJECTION SUBCUTANEOUS at 08:00

## 2023-11-08 RX ADMIN — LEVETIRACETAM 1000 MG: 100 INJECTION, SOLUTION INTRAVENOUS at 19:07

## 2023-11-08 RX ADMIN — INSULIN GLARGINE 20 UNITS: 100 INJECTION, SOLUTION SUBCUTANEOUS at 20:51

## 2023-11-08 RX ADMIN — SODIUM CHLORIDE, PRESERVATIVE FREE 10 ML: 5 INJECTION INTRAVENOUS at 08:00

## 2023-11-08 RX ADMIN — FOLIC ACID 1 MG: 5 INJECTION, SOLUTION INTRAMUSCULAR; INTRAVENOUS; SUBCUTANEOUS at 09:52

## 2023-11-08 RX ADMIN — BUMETANIDE 1 MG: 0.25 INJECTION INTRAMUSCULAR; INTRAVENOUS at 20:52

## 2023-11-08 RX ADMIN — CHLORHEXIDINE GLUCONATE 0.12% ORAL RINSE 15 ML: 1.2 LIQUID ORAL at 08:00

## 2023-11-08 RX ADMIN — MICONAZOLE NITRATE: 2 POWDER TOPICAL at 20:53

## 2023-11-08 RX ADMIN — CHLORHEXIDINE GLUCONATE 0.12% ORAL RINSE 15 ML: 1.2 LIQUID ORAL at 02:48

## 2023-11-08 RX ADMIN — CHLORHEXIDINE GLUCONATE 0.12% ORAL RINSE 15 ML: 1.2 LIQUID ORAL at 20:53

## 2023-11-08 RX ADMIN — SENNOSIDES 17.2 MG: 8.6 TABLET, FILM COATED ORAL at 20:51

## 2023-11-08 RX ADMIN — ENOXAPARIN SODIUM 90 MG: 100 INJECTION SUBCUTANEOUS at 20:52

## 2023-11-08 RX ADMIN — POLYETHYLENE GLYCOL 3350 17 G: 17 POWDER, FOR SOLUTION ORAL at 08:00

## 2023-11-08 RX ADMIN — FAMOTIDINE 20 MG: 20 TABLET ORAL at 08:00

## 2023-11-08 RX ADMIN — MODAFINIL 200 MG: 100 TABLET ORAL at 08:00

## 2023-11-08 RX ADMIN — LEVETIRACETAM 1000 MG: 100 INJECTION, SOLUTION INTRAVENOUS at 06:17

## 2023-11-08 RX ADMIN — BUMETANIDE 1 MG: 0.25 INJECTION INTRAMUSCULAR; INTRAVENOUS at 08:01

## 2023-11-08 RX ADMIN — MICONAZOLE NITRATE: 2 POWDER TOPICAL at 08:02

## 2023-11-08 RX ADMIN — SODIUM CHLORIDE, PRESERVATIVE FREE 10 ML: 5 INJECTION INTRAVENOUS at 20:53

## 2023-11-08 RX ADMIN — FAMOTIDINE 20 MG: 20 TABLET ORAL at 20:52

## 2023-11-08 ASSESSMENT — PULMONARY FUNCTION TESTS
PIF_VALUE: 13
PIF_VALUE: 14
PIF_VALUE: 13
PIF_VALUE: 14

## 2023-11-08 NOTE — PROGRESS NOTES
Comprehensive Nutrition Assessment    Type and Reason for Visit:  Reassess (Tube Feed Monitor)    Nutrition Recommendations/Plan:   Continue current tube feed regimen: Vital 1.2 bolus 215 ml every 4 hours to better meet nutritional needs. Free water flush 30 ml before and after each bolus (or per provider). No BM in 11 days - scheduled bowel meds as ordered      Malnutrition Assessment:  Malnutrition Status:  Insufficient data (10/13/23 1128)    Context:  Chronic Illness     Findings of the 6 clinical characteristics of malnutrition:  Energy Intake:  Unable to assess (pt. intubated)  Weight Loss:  Greater than 10% over 6 months (31# or 16% in 5 months)     Body Fat Loss:  Unable to assess (facial edema; RN asked not to disturb pt. this am)     Muscle Mass Loss:  No significant muscle mass loss Temples (temporalis), Clavicles (pectoralis & deltoids)  Fluid Accumulation:  Mild     Strength:  Not Performed    Nutrition Assessment:     Pt improving from a nutritional standpoint AEB pt NPO, intubated and is tolerating Vital 1.2 bolus tube feed at goal. 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       Nutrition Related Findings:    Pt. Report/Treatments/Miscellaneous: intubated, bolus feeds per ENT service, tolerating bolus feeding per RN however no BM since 10/28  GI Status: last BMx 1 on 10/28 (no BM x 11 days)  Pertinent Labs: Sodium 140, Potassium 4, BUN 34, Creatinine 0.6, Glucose 152  Pertinent Meds: bumex, folic acid, lantus, humalog, keppra, glycolax, senokot     Wound Type: Pressure Injury, Stage II (coccyx; stage 1 buttocks; skin tear abdomen; 10/12 ENT surgery: Cricotracheal Resection, Laryngoplasty with Flap and

## 2023-11-08 NOTE — PROGRESS NOTES
CRITICAL CARE PROGRESS NOTE      Patient:  Fabiola Osoiro    Unit/Bed:4D-08/008-A  YOB: 1950  MRN: [id number]   PCP: Yolande Reveles MD  Date of Admission: 10/12/2023  Chief Complaint:-Tracheal stenosis s/p resection    Assessment and Plan:    Anoxic brain injury: Secondary to respiratory arrest and subsequent cardiac arrest following failed extubation 10/16. EEG shows no seizures, creatinine is consistent with encephalopathy (moderate-severe). MRI brain 10/19: No evidence of anoxia, no evidence of acute infarct patient has a cough and gag, extends to pain but not following commands, positive Babinski sign. Continue Provigil 200 mg to promote wakefulness  Keppra 1000 mg IV daily  Continue to monitor for further signs of neurological improvement  Acute hypoxic respiratory failure: Patient is minimally vent dependent without sedation, barrier to extubation GCS of 4. She is s/p cricoid tracheal resection, laryngeal plasty with flap and focal lateral ventilation sedation placement with Dr. Keila Slater 10/12. Keep head of breath 30 degrees with pillow under head for semiflexed evaluation per ENT. Bilateral extremity DVTs, superficial: Venous duplex bilateral lower extremity 10/12: Thrombus in bilateral posterior tibial veins and right peroneal vein. Venous Doppler upper extremities 10/2020 shows left axillary, brachial, basilic, and cephalic vein thrombosis. Right cephalic and antecubital thrombosis. Continue therapeutic Lovenox  T2DM: 1/2023 A1c 5.5. On high-dose sliding scale insulin  Chronic diastolic heart failure: Echo 1/2/2023 shows EF of 60 to 65%  Strict intake and output  Bumex 1 mg twice daily  Hypertension: Continue home meds  Leukocytosis: Resolved      INITIAL H AND P AND ICU COURSE:  Patient is a 72-year-old female in ICU for close airway monitoring. She is status post segmental or critical tracheal resection.   History of infection in the status post trach (tracheal supraclavicular adenopathy. Neurologic: Unable to obtain as patient is intubated and sedated    Data: (All radiographs, tracings, PFTs, and imaging are personally viewed and interpreted unless otherwise noted). Sodium 40, potassium 4, chloride 100, bicarb 29, BUN 34, creatinine 1.6, glucose 152  WBC 5.6, hemoglobin 8.3, hematocrit 26.4, platelets 375  Telemetry shows normal sinus rhythm,        Seen with multidisciplinary ICU team yes. Meets Continued ICU Level Care Criteria:    [x] Yes   [] No - Transfer Planned to listed location:  [] HOSPITALIST CONTACTED-      Case and plan discussed with Dr. Deacon Arauz. Electronically signed by Antonio Kern, [facility]  Patient seen by me including key components of medical care. Case discussed with resident physician. Ventilator dependent. Family updated. Still no neurologic improvement since Friday. Italicized font, if present,  represents changes to the note made by me. CC time 35 minutes. Time was discontiguous. Time does not include procedure. Time does include my direct assessment of the patient and coordination of care. Time represents more than 50% of the time involved with patient care by the [address] [place] [address] team.  Electronically signed by Pippa Du.  Deacon Arauz MD.

## 2023-11-08 NOTE — PLAN OF CARE
Patient Weaning Progress    The patient's vent settings was able to be weaned this shift. Ventilator settings that were weaned              [] Mode   [] Pressure support weaned   [x] Fio2 weaned   [] Peep weaned      Spontaneous weaning trial  was attempted. due to defined parameters for SBT (spontaneous breathing trial) not being met. *Results of SBT documented under SBTOUTCOME note. Reason that defined ventilator parameters for SBT was not met              [] Patient condition requires increased ventilator settings  [] Requires increased sedation   [] Settings not within weaning range   [] SAT not completed   [x] Physician orders    The patient was able to tolerate SBT. Evac tube was  hooked up with continuous low suction(20-30mmHg)        Family mutually agreed on goals.

## 2023-11-08 NOTE — PLAN OF CARE
Problem: Respiratory - Adult  Goal: Normal spontaneous ventilation  Outcome: Progressing  Goal: Achieves optimal ventilation and oxygenation  Outcome: Progressing                                                Patient Weaning Progress    The patient's vent settings was able to be weaned this shift. Ventilator settings that were weaned              [] Mode   [] Pressure support weaned   [x] Fio2 weaned   [] Peep weaned      Spontaneous weaning trial  was attempted. due to defined parameters for SBT (spontaneous breathing trial) not being met. *Results of SBT documented under SBTOUTCOME note. Reason that defined ventilator parameters for SBT was not met              [] Patient condition requires increased ventilator settings  [] Requires increased sedation   [] Settings not within weaning range   [] SAT not completed   [] Physician orders    The patient was able to tolerate SBT. RSBI  was 40 with EtCO2 of 41 and SpO2 of 95 on 21% FiO2. Spontanteous VT was 471 and RR 19 breaths/min. Unable to get agreement for goals because no family is present and patient cannot respond.

## 2023-11-08 NOTE — CARE COORDINATION
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 There is also nonocclusive thrombus within the left posterior tibial vein  10/16 Extubated to bipap  10/16 Code Blue  10/16 Reintubated  10/16 Bronch: thick bloody secretions in RLL removed  10/16 CXR: Mild stable cardiomegaly with pulmonary vascular congestion suspicious for congestive heart failure; Prominent pulmonary interstitium suspicious for pulmonary edema  10/17 5 hr EEG: No seizures noted; diffuse background attenuation and suppression without evidence of reactivity; severe encephalopathy  10/17 CT Head: Stable CT appearance the brain. No acute findings  10/17 MRI Brain: No acute findings  47/48 PICC left basilic  20/97 CT Head: No acute findings  10/18 OR: ET Exchange bronhoscopy and lavage  10/19 4hr 40 min EEG: This EEG was abnormal. The background abnormalities indicated a moderate to severe encephalopathy, nonspecific to etiology. The generalized periodic discharges (GPDs) indicated underlying cortical irritability/increased risk of seizures, and can be seen in various encephalopathies. No seizures were recorded  10/19 MRI Brain: No evidence of an acute infarct. No evidence of anoxia; Stable mild severity chronic small vessel ischemic changes; Global volume loss  10/23 4-day EEG: Moderate to severe encephalopathy, improved as recording progressed.  Frequent and at times near continuous runs of GPDs with often typical and at times atypical triphasic morphology, activated with stimulation at the beginning of recording, consistent with SIRPIDs   10/23 BUE Venous Dopplers: There is noncompressibility and absent flow, likely secondary to acute thrombus, in the left axillary, brachial, basilic and cephalic veins. There is partial flow and compressibility in the left subclavian vein. There is noncompressibility and absent flow in the right cephalic and antecubital veins. Remaining vein segments demonstrate normal compressibility and flow  11/2 BUE Venous dopplers: There is acute appearing DVT in the left axillary vein; Chronic appearing DVT is present in the distal left subclavian vein; Superficial thrombophlebitis involving the left basilic vein; Overall this examination has improved since the prior exam dated 10/23/2023    Barriers to Discharge: Plan to monitor for neurologic recovery and re-evaluate on Friday 11/17. Remains on vent w/ETT on SBT, FIO2 21%, sats 94%. Afebrile. NSR. Unable to follow commands, CORTEZ x4 to painful stim, decerebrate x4. +cough, no gag. Intensivist, Neurology, and ENT following. Dietitian and Wound Care following. Telemetry, PICC, NG w/bolus TF, werner, SCDs. IV bumex 1 mg bid, Peridex, lovenox, pepcid, IV folic acid, lantus, SSI, IV keppra, desenex, provigil, prn fibercon, glycolax, scopolamine patch, senna, Electrolyte replacement protocols. PCP: Cat Strauss MD  Readmission Risk Score: 20.8%  Patient Goals/Plan/Treatment Preferences: From home w/. Plan pending clinical course.

## 2023-11-09 LAB
ANION GAP SERPL CALC-SCNC: 8 MEQ/L (ref 8–16)
BUN SERPL-MCNC: 36 MG/DL (ref 7–22)
CALCIUM SERPL-MCNC: 9 MG/DL (ref 8.5–10.5)
CHLORIDE SERPL-SCNC: 104 MEQ/L (ref 98–111)
CO2 SERPL-SCNC: 29 MEQ/L (ref 23–33)
CREAT SERPL-MCNC: 0.6 MG/DL (ref 0.4–1.2)
DEPRECATED RDW RBC AUTO: 57 FL (ref 35–45)
ERYTHROCYTE [DISTWIDTH] IN BLOOD BY AUTOMATED COUNT: 16.3 % (ref 11.5–14.5)
GFR SERPL CREATININE-BSD FRML MDRD: > 60 ML/MIN/1.73M2
GLUCOSE BLD STRIP.AUTO-MCNC: 151 MG/DL (ref 70–108)
GLUCOSE BLD STRIP.AUTO-MCNC: 161 MG/DL (ref 70–108)
GLUCOSE BLD STRIP.AUTO-MCNC: 167 MG/DL (ref 70–108)
GLUCOSE BLD STRIP.AUTO-MCNC: 196 MG/DL (ref 70–108)
GLUCOSE BLD STRIP.AUTO-MCNC: 206 MG/DL (ref 70–108)
GLUCOSE SERPL-MCNC: 160 MG/DL (ref 70–108)
HCT VFR BLD AUTO: 24.8 % (ref 37–47)
HGB BLD-MCNC: 7.7 GM/DL (ref 12–16)
MCH RBC QN AUTO: 30 PG (ref 26–33)
MCHC RBC AUTO-ENTMCNC: 31 GM/DL (ref 32.2–35.5)
MCV RBC AUTO: 96.5 FL (ref 81–99)
PLATELET # BLD AUTO: 232 THOU/MM3 (ref 130–400)
PMV BLD AUTO: 9.8 FL (ref 9.4–12.4)
POTASSIUM SERPL-SCNC: 4 MEQ/L (ref 3.5–5.2)
RBC # BLD AUTO: 2.57 MILL/MM3 (ref 4.2–5.4)
SODIUM SERPL-SCNC: 141 MEQ/L (ref 135–145)
WBC # BLD AUTO: 6.1 THOU/MM3 (ref 4.8–10.8)

## 2023-11-09 PROCEDURE — 2580000003 HC RX 258: Performed by: EMERGENCY MEDICINE

## 2023-11-09 PROCEDURE — 2000000000 HC ICU R&B

## 2023-11-09 PROCEDURE — 6370000000 HC RX 637 (ALT 250 FOR IP)

## 2023-11-09 PROCEDURE — 6370000000 HC RX 637 (ALT 250 FOR IP): Performed by: INTERNAL MEDICINE

## 2023-11-09 PROCEDURE — 6370000000 HC RX 637 (ALT 250 FOR IP): Performed by: NURSE PRACTITIONER

## 2023-11-09 PROCEDURE — 82948 REAGENT STRIP/BLOOD GLUCOSE: CPT

## 2023-11-09 PROCEDURE — 2500000003 HC RX 250 WO HCPCS

## 2023-11-09 PROCEDURE — 99291 CRITICAL CARE FIRST HOUR: CPT | Performed by: INTERNAL MEDICINE

## 2023-11-09 PROCEDURE — 80048 BASIC METABOLIC PNL TOTAL CA: CPT

## 2023-11-09 PROCEDURE — 6370000000 HC RX 637 (ALT 250 FOR IP): Performed by: EMERGENCY MEDICINE

## 2023-11-09 PROCEDURE — 99024 POSTOP FOLLOW-UP VISIT: CPT | Performed by: PHYSICIAN ASSISTANT

## 2023-11-09 PROCEDURE — 85027 COMPLETE CBC AUTOMATED: CPT

## 2023-11-09 PROCEDURE — 2500000003 HC RX 250 WO HCPCS: Performed by: EMERGENCY MEDICINE

## 2023-11-09 PROCEDURE — 36415 COLL VENOUS BLD VENIPUNCTURE: CPT

## 2023-11-09 PROCEDURE — 94761 N-INVAS EAR/PLS OXIMETRY MLT: CPT

## 2023-11-09 PROCEDURE — 94003 VENT MGMT INPAT SUBQ DAY: CPT

## 2023-11-09 PROCEDURE — 6360000002 HC RX W HCPCS: Performed by: INTERNAL MEDICINE

## 2023-11-09 PROCEDURE — 2580000003 HC RX 258: Performed by: NURSE PRACTITIONER

## 2023-11-09 PROCEDURE — 6360000002 HC RX W HCPCS: Performed by: EMERGENCY MEDICINE

## 2023-11-09 RX ORDER — POLYETHYLENE GLYCOL 3350 17 G/17G
17 POWDER, FOR SOLUTION ORAL 2 TIMES DAILY
Status: DISCONTINUED | OUTPATIENT
Start: 2023-11-09 | End: 2023-11-13

## 2023-11-09 RX ORDER — BISACODYL 10 MG
10 SUPPOSITORY, RECTAL RECTAL DAILY
Status: DISCONTINUED | OUTPATIENT
Start: 2023-11-09 | End: 2023-11-13

## 2023-11-09 RX ADMIN — FOLIC ACID 1 MG: 5 INJECTION, SOLUTION INTRAMUSCULAR; INTRAVENOUS; SUBCUTANEOUS at 08:13

## 2023-11-09 RX ADMIN — FAMOTIDINE 20 MG: 20 TABLET ORAL at 07:53

## 2023-11-09 RX ADMIN — LEVETIRACETAM 1000 MG: 100 INJECTION, SOLUTION INTRAVENOUS at 18:15

## 2023-11-09 RX ADMIN — INSULIN LISPRO 4 UNITS: 100 INJECTION, SOLUTION INTRAVENOUS; SUBCUTANEOUS at 18:47

## 2023-11-09 RX ADMIN — FAMOTIDINE 20 MG: 20 TABLET ORAL at 22:06

## 2023-11-09 RX ADMIN — ENOXAPARIN SODIUM 90 MG: 100 INJECTION SUBCUTANEOUS at 22:12

## 2023-11-09 RX ADMIN — CHLORHEXIDINE GLUCONATE 0.12% ORAL RINSE 15 ML: 1.2 LIQUID ORAL at 22:08

## 2023-11-09 RX ADMIN — LEVETIRACETAM 1000 MG: 100 INJECTION, SOLUTION INTRAVENOUS at 06:22

## 2023-11-09 RX ADMIN — SENNOSIDES 17.2 MG: 8.6 TABLET, FILM COATED ORAL at 22:05

## 2023-11-09 RX ADMIN — POLYETHYLENE GLYCOL 3350 17 G: 17 POWDER, FOR SOLUTION ORAL at 22:06

## 2023-11-09 RX ADMIN — INSULIN GLARGINE 20 UNITS: 100 INJECTION, SOLUTION SUBCUTANEOUS at 22:16

## 2023-11-09 RX ADMIN — MODAFINIL 200 MG: 100 TABLET ORAL at 07:52

## 2023-11-09 RX ADMIN — ENOXAPARIN SODIUM 90 MG: 100 INJECTION SUBCUTANEOUS at 07:53

## 2023-11-09 RX ADMIN — BUMETANIDE 1 MG: 0.25 INJECTION INTRAMUSCULAR; INTRAVENOUS at 07:52

## 2023-11-09 RX ADMIN — SODIUM CHLORIDE, PRESERVATIVE FREE 10 ML: 5 INJECTION INTRAVENOUS at 22:07

## 2023-11-09 RX ADMIN — CHLORHEXIDINE GLUCONATE 0.12% ORAL RINSE 15 ML: 1.2 LIQUID ORAL at 07:52

## 2023-11-09 RX ADMIN — POLYETHYLENE GLYCOL 3350 17 G: 17 POWDER, FOR SOLUTION ORAL at 07:53

## 2023-11-09 RX ADMIN — SODIUM CHLORIDE, PRESERVATIVE FREE 10 ML: 5 INJECTION INTRAVENOUS at 07:52

## 2023-11-09 RX ADMIN — MICONAZOLE NITRATE: 2 POWDER TOPICAL at 07:53

## 2023-11-09 RX ADMIN — BUMETANIDE 1 MG: 0.25 INJECTION INTRAMUSCULAR; INTRAVENOUS at 22:05

## 2023-11-09 RX ADMIN — MICONAZOLE NITRATE: 2 POWDER TOPICAL at 22:07

## 2023-11-09 ASSESSMENT — PULMONARY FUNCTION TESTS
PIF_VALUE: 12
PIF_VALUE: 12
PIF_VALUE: 14
PIF_VALUE: 12
PIF_VALUE: 11
PIF_VALUE: 12

## 2023-11-09 NOTE — PLAN OF CARE
Problem: Respiratory - Adult  Goal: Normal spontaneous ventilation  Outcome: Progressing     Problem: Respiratory - Adult  Goal: Achieves optimal ventilation and oxygenation  Outcome: Progressing                                                Patient Weaning Progress    The patient's vent settings was able to be weaned this shift. Ventilator settings that were weaned              [] Mode   [x] Pressure support weaned   [] Fio2 weaned   [] Peep weaned      Spontaneous weaning trial  was attempted. *Results of SBT documented under SBTOUTCOME note. The patient was able to tolerate SBT. RSBI  was 26.1 with EtCO2 of 47 and SpO2 of 99 on 21% FiO2. Spontanteous VT was 498 and RR 13 breaths/min. Evac tube was not  hooked up with continuous low suction(20-30mmHg)      Cuff was not  deflated to determine cuff leak. Unable to get agreement for goals because no family is present and patient cannot respond.

## 2023-11-09 NOTE — PROGRESS NOTES
Comprehensive Nutrition Assessment    Type and Reason for Visit:  Reassess (Tube Feed Monitor)    Nutrition Recommendations/Plan:   Continue current tube feed regimen: Vital 1.2 bolus 215 ml every 4 hours to better meet nutritional needs. Free water flush 30 ml before and after each bolus (or per provider). No BM in 12 days - continue scheduled bowel meds as ordered. Malnutrition Assessment:  Malnutrition Status:  Insufficient data (10/13/23 1128)    Context:  Chronic Illness     Findings of the 6 clinical characteristics of malnutrition:  Energy Intake:  Unable to assess (pt. intubated)  Weight Loss:  Greater than 10% over 6 months (31# or 16% in 5 months)     Body Fat Loss:  Unable to assess (facial edema; RN asked not to disturb pt. this am)     Muscle Mass Loss:  No significant muscle mass loss Temples (temporalis), Clavicles (pectoralis & deltoids)  Fluid Accumulation:  Mild     Strength:  Not Performed    Nutrition Assessment: Pt improving from a nutritional standpoint AEB pt NPO, intubated and is tolerating Vital 1.2 bolus tube feed at goal; however, pt with no BM in 12 days. 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       Nutrition Related Findings:    Pt. Report/Treatments/Miscellaneous: intubated, bolus feeds per ENT service, tolerating bolus feeding per RN however no BM since 10/28  GI Status: last BMx 1 on 10/28 (no BM x 11 days)  Pertinent Labs: Sodium 141, Potassium 4, BUN 36, Creatinine 0.6, Glucose 160  Pertinent Meds: bumex, folic acid, lantus, humalog, keppra, glycolax, senokot   Wound Type: Pressure Injury, Stage II (coccyx; stage 1 buttocks; skin tear abdomen; 10/12 ENT surgery: Cricotracheal impaired respiratory function as evidenced by NPO or clear liquid status due to medical condition, intubation, nutrition support - enteral nutrition    Nutrition Interventions:   Food and/or Nutrient Delivery: Continue NPO, Continue Current Tube Feeding  Nutrition Education/Counseling: No recommendation at this time  Coordination of Nutrition Care: Continue to monitor while inpatient, Interdisciplinary Rounds    Goals:  Previous Goal Met: Goal(s) Achieved  Goals: Tolerate nutrition support at goal rate, by next RD assessment    Nutrition Monitoring and Evaluation:   Food/Nutrient Intake Outcomes: Enteral Nutrition Intake/Tolerance, IVF Intake  Physical Signs/Symptoms Outcomes: Biochemical Data, Chewing or Swallowing, GI Status, Fluid Status or Edema, Hemodynamic Status, Nutrition Focused Physical Findings, Skin, Weight    Discharge Planning:     Too soon to determine     Aracelis Rondon MS, RD, LD  Contact: [phone]

## 2023-11-09 NOTE — PROGRESS NOTES
CRITICAL CARE PROGRESS NOTE      Patient:  Kanwal Becerril    Unit/Bed:4D-08/008-A  YOB: 1950  MRN: [id number]   PCP: Edd Saez MD  Date of Admission: 10/12/2023  Chief Complaint:-Tracheal stenosis status post resection    Assessment and Plan:    Anoxic brain injury: Secondary to respiratory arrest and subsequent cardiac arrest following failed extubation 10/16. EEG shows no seizures, creatinine is consistent with encephalopathy (moderate-severe). MRI brain 10/19: No evidence of anoxia, no evidence of acute infarct patient has a cough and gag, extends to pain but not following commands, positive Babinski sign. Continue Provigil 200 mg to promote wakefulness  Keppra 1000 mg IV daily  Continue to monitor for further signs of neurological improvement  Acute hypoxic respiratory failure: Patient is minimally vent dependent without sedation, barrier to extubation GCS of 4. She is s/p cricoid tracheal resection, laryngeal plasty with flap and focal lateral ventilation sedation placement with Dr. Jluis Jarquin 10/12. Keep head of breath 30 degrees with pillow under head for semiflexed evaluation per ENT. Bilateral extremity DVTs, superficial: Venous duplex bilateral lower extremity 10/12: Thrombus in bilateral posterior tibial veins and right peroneal vein. Venous Doppler upper extremities 10/2020 shows left axillary, brachial, basilic, and cephalic vein thrombosis. Right cephalic and antecubital thrombosis. Continue therapeutic Lovenox  T2DM: 1/2023 A1c 5.5. On high-dose sliding scale insulin  Chronic diastolic heart failure: Echo 1/2/2023 shows EF of 60 to 65%  Strict intake and output  Bumex 1 mg twice daily  Hypertension: Continue home meds  Leukocytosis: Resolved    INITIAL H AND P AND ICU COURSE:  Patient is a 62-year-old female in ICU for close airway monitoring. She is status post segmental or critical tracheal resection.   History of infection in the status post trach (tracheal

## 2023-11-10 LAB
GLUCOSE BLD STRIP.AUTO-MCNC: 163 MG/DL (ref 70–108)
GLUCOSE BLD STRIP.AUTO-MCNC: 180 MG/DL (ref 70–108)
GLUCOSE BLD STRIP.AUTO-MCNC: 190 MG/DL (ref 70–108)

## 2023-11-10 PROCEDURE — 6370000000 HC RX 637 (ALT 250 FOR IP): Performed by: NURSE PRACTITIONER

## 2023-11-10 PROCEDURE — 99024 POSTOP FOLLOW-UP VISIT: CPT | Performed by: PHYSICIAN ASSISTANT

## 2023-11-10 PROCEDURE — 2000000000 HC ICU R&B

## 2023-11-10 PROCEDURE — 2500000003 HC RX 250 WO HCPCS

## 2023-11-10 PROCEDURE — 2580000003 HC RX 258: Performed by: EMERGENCY MEDICINE

## 2023-11-10 PROCEDURE — 94761 N-INVAS EAR/PLS OXIMETRY MLT: CPT

## 2023-11-10 PROCEDURE — 6370000000 HC RX 637 (ALT 250 FOR IP): Performed by: EMERGENCY MEDICINE

## 2023-11-10 PROCEDURE — 82948 REAGENT STRIP/BLOOD GLUCOSE: CPT

## 2023-11-10 PROCEDURE — 99291 CRITICAL CARE FIRST HOUR: CPT | Performed by: INTERNAL MEDICINE

## 2023-11-10 PROCEDURE — 6360000002 HC RX W HCPCS: Performed by: INTERNAL MEDICINE

## 2023-11-10 PROCEDURE — 94003 VENT MGMT INPAT SUBQ DAY: CPT

## 2023-11-10 PROCEDURE — 6370000000 HC RX 637 (ALT 250 FOR IP)

## 2023-11-10 PROCEDURE — 6360000002 HC RX W HCPCS: Performed by: EMERGENCY MEDICINE

## 2023-11-10 PROCEDURE — 2580000003 HC RX 258: Performed by: NURSE PRACTITIONER

## 2023-11-10 PROCEDURE — 2500000003 HC RX 250 WO HCPCS: Performed by: EMERGENCY MEDICINE

## 2023-11-10 RX ADMIN — MICONAZOLE NITRATE: 2 POWDER TOPICAL at 09:02

## 2023-11-10 RX ADMIN — ENOXAPARIN SODIUM 90 MG: 100 INJECTION SUBCUTANEOUS at 09:05

## 2023-11-10 RX ADMIN — SENNOSIDES 17.2 MG: 8.6 TABLET, FILM COATED ORAL at 22:01

## 2023-11-10 RX ADMIN — ENOXAPARIN SODIUM 90 MG: 100 INJECTION SUBCUTANEOUS at 22:02

## 2023-11-10 RX ADMIN — CHLORHEXIDINE GLUCONATE 0.12% ORAL RINSE 15 ML: 1.2 LIQUID ORAL at 22:02

## 2023-11-10 RX ADMIN — SODIUM CHLORIDE, PRESERVATIVE FREE 10 ML: 5 INJECTION INTRAVENOUS at 09:02

## 2023-11-10 RX ADMIN — FOLIC ACID 1 MG: 5 INJECTION, SOLUTION INTRAMUSCULAR; INTRAVENOUS; SUBCUTANEOUS at 09:05

## 2023-11-10 RX ADMIN — POLYETHYLENE GLYCOL 3350 17 G: 17 POWDER, FOR SOLUTION ORAL at 09:02

## 2023-11-10 RX ADMIN — MICONAZOLE NITRATE: 2 POWDER TOPICAL at 22:01

## 2023-11-10 RX ADMIN — LEVETIRACETAM 1000 MG: 100 INJECTION, SOLUTION INTRAVENOUS at 06:42

## 2023-11-10 RX ADMIN — BUMETANIDE 1 MG: 0.25 INJECTION INTRAMUSCULAR; INTRAVENOUS at 22:02

## 2023-11-10 RX ADMIN — CHLORHEXIDINE GLUCONATE 0.12% ORAL RINSE 15 ML: 1.2 LIQUID ORAL at 09:03

## 2023-11-10 RX ADMIN — INSULIN GLARGINE 20 UNITS: 100 INJECTION, SOLUTION SUBCUTANEOUS at 22:02

## 2023-11-10 RX ADMIN — BUMETANIDE 1 MG: 0.25 INJECTION INTRAMUSCULAR; INTRAVENOUS at 09:03

## 2023-11-10 RX ADMIN — LEVETIRACETAM 1000 MG: 100 INJECTION, SOLUTION INTRAVENOUS at 17:51

## 2023-11-10 RX ADMIN — MODAFINIL 200 MG: 100 TABLET ORAL at 09:03

## 2023-11-10 RX ADMIN — FAMOTIDINE 20 MG: 20 TABLET ORAL at 09:03

## 2023-11-10 RX ADMIN — BISACODYL 10 MG: 10 SUPPOSITORY RECTAL at 22:01

## 2023-11-10 RX ADMIN — SODIUM CHLORIDE, PRESERVATIVE FREE 10 ML: 5 INJECTION INTRAVENOUS at 22:01

## 2023-11-10 RX ADMIN — POLYETHYLENE GLYCOL 3350 17 G: 17 POWDER, FOR SOLUTION ORAL at 22:01

## 2023-11-10 RX ADMIN — BISACODYL 10 MG: 10 SUPPOSITORY RECTAL at 09:03

## 2023-11-10 RX ADMIN — FAMOTIDINE 20 MG: 20 TABLET ORAL at 22:01

## 2023-11-10 ASSESSMENT — PULMONARY FUNCTION TESTS
PIF_VALUE: 12
PIF_VALUE: 14
PIF_VALUE: 14
PIF_VALUE: 13
PIF_VALUE: 13
PIF_VALUE: 14

## 2023-11-10 NOTE — PLAN OF CARE
Problem: Respiratory - Adult  Goal: Achieves optimal ventilation and oxygenation  Outcome: Not Progressing  Flowsheets  Taken 11/9/2023 2134 by Sarai Fischer RCP  Achieves optimal ventilation and oxygenation:   Assess for changes in respiratory status   Position to facilitate oxygenation and minimize respiratory effort   Assess the need for suctioning and aspirate as needed   Respiratory therapy support as indicated   Assess for changes in mentation and behavior   Oxygen supplementation based on oxygen saturation or arterial blood gases   Encourage broncho-pulmonary hygiene including cough, deep breathe, incentive spirometry   Assess and instruct to report shortness of breath or any respiratory difficulty  Note: Intubated/Vented. Wean as tolerated. SBT when appropriate.

## 2023-11-10 NOTE — CARE COORDINATION
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 There is also nonocclusive thrombus within the left posterior tibial vein  10/16 Extubated to bipap  10/16 Code Blue  10/16 Reintubated  10/16 Bronch: thick bloody secretions in RLL removed  10/16 CXR: Mild stable cardiomegaly with pulmonary vascular congestion suspicious for congestive heart failure; Prominent pulmonary interstitium suspicious for pulmonary edema  10/17 5 hr EEG: No seizures noted; diffuse background attenuation and suppression without evidence of reactivity; severe encephalopathy  10/17 CT Head: Stable CT appearance the brain. No acute findings  10/17 MRI Brain: No acute findings  96/18 PICC left basilic  92/39 CT Head: No acute findings  10/18 OR: ET Exchange bronhoscopy and lavage  10/19 4hr 40 min EEG: This EEG was abnormal. The background abnormalities indicated a moderate to severe encephalopathy, nonspecific to etiology. The generalized periodic discharges (GPDs) indicated underlying cortical irritability/increased risk of seizures, and can be seen in various encephalopathies. No seizures were recorded  10/19 MRI Brain: No evidence of an acute infarct. No evidence of anoxia; Stable mild severity chronic small vessel ischemic changes; Global volume loss  10/23 4-day EEG: Moderate to severe encephalopathy, improved as recording progressed.  Frequent and at times near continuous runs of GPDs with often typical and at times atypical triphasic

## 2023-11-10 NOTE — PROGRESS NOTES
Patient Weaning Progress    The patient's vent settings was able to be weaned this shift. Ventilator settings that were weaned              [x] Mode   [] Pressure support weaned   [] Fio2 weaned   [] Peep weaned      Spontaneous weaning trial  was attempted. The patient was able to tolerate SBT. RSBI  was . 37 with EtCO2 of 41 and SpO2 of 99 on 21% FiO2. Spontanteous VT was 455 and RR 17 breaths/min. Unable to get agreement for goals because no family is present and patient cannot respond.

## 2023-11-10 NOTE — PROGRESS NOTES
Pt is a 72y. o. female, intubated and sedated, in 4D-08. No family were present;  prayed for pt at the doorway to her room. Contacted pt spouse by phone at 17:47, offered support and prayer for both of them.      11/10/23 1709   Encounter Summary   Encounter Overview/Reason  Attempted Encounter   Service Provided For: Patient   Referral/Consult From: [address] Family members   Last Encounter  11/10/23  (NR)   Complexity of Encounter Low   Begin Time 1422   End Time  1423   Total Time Calculated 1 min   Spiritual/Emotional needs   Type Spiritual Support   Assessment/Intervention/Outcome   Assessment Unable to assess   Intervention Prayer (assurance of)/[place]   Outcome Did not respond

## 2023-11-11 LAB
ANION GAP SERPL CALC-SCNC: 10 MEQ/L (ref 8–16)
BUN SERPL-MCNC: 34 MG/DL (ref 7–22)
CALCIUM SERPL-MCNC: 9.1 MG/DL (ref 8.5–10.5)
CHLORIDE SERPL-SCNC: 99 MEQ/L (ref 98–111)
CO2 SERPL-SCNC: 29 MEQ/L (ref 23–33)
CREAT SERPL-MCNC: 0.5 MG/DL (ref 0.4–1.2)
DEPRECATED RDW RBC AUTO: 56.6 FL (ref 35–45)
ERYTHROCYTE [DISTWIDTH] IN BLOOD BY AUTOMATED COUNT: 15.9 % (ref 11.5–14.5)
GFR SERPL CREATININE-BSD FRML MDRD: > 60 ML/MIN/1.73M2
GLUCOSE BLD STRIP.AUTO-MCNC: 145 MG/DL (ref 70–108)
GLUCOSE BLD STRIP.AUTO-MCNC: 157 MG/DL (ref 70–108)
GLUCOSE BLD STRIP.AUTO-MCNC: 168 MG/DL (ref 70–108)
GLUCOSE BLD STRIP.AUTO-MCNC: 188 MG/DL (ref 70–108)
GLUCOSE SERPL-MCNC: 146 MG/DL (ref 70–108)
HCT VFR BLD AUTO: 27.4 % (ref 37–47)
HGB BLD-MCNC: 8.6 GM/DL (ref 12–16)
MCH RBC QN AUTO: 30.5 PG (ref 26–33)
MCHC RBC AUTO-ENTMCNC: 31.4 GM/DL (ref 32.2–35.5)
MCV RBC AUTO: 97.2 FL (ref 81–99)
PLATELET # BLD AUTO: 218 THOU/MM3 (ref 130–400)
PMV BLD AUTO: 10 FL (ref 9.4–12.4)
POTASSIUM SERPL-SCNC: 4.1 MEQ/L (ref 3.5–5.2)
RBC # BLD AUTO: 2.82 MILL/MM3 (ref 4.2–5.4)
SODIUM SERPL-SCNC: 138 MEQ/L (ref 135–145)
WBC # BLD AUTO: 6.8 THOU/MM3 (ref 4.8–10.8)

## 2023-11-11 PROCEDURE — 2580000003 HC RX 258: Performed by: NURSE PRACTITIONER

## 2023-11-11 PROCEDURE — 80048 BASIC METABOLIC PNL TOTAL CA: CPT

## 2023-11-11 PROCEDURE — 6370000000 HC RX 637 (ALT 250 FOR IP): Performed by: EMERGENCY MEDICINE

## 2023-11-11 PROCEDURE — 2580000003 HC RX 258: Performed by: EMERGENCY MEDICINE

## 2023-11-11 PROCEDURE — 2000000000 HC ICU R&B

## 2023-11-11 PROCEDURE — 94003 VENT MGMT INPAT SUBQ DAY: CPT

## 2023-11-11 PROCEDURE — 36415 COLL VENOUS BLD VENIPUNCTURE: CPT

## 2023-11-11 PROCEDURE — 6360000002 HC RX W HCPCS: Performed by: EMERGENCY MEDICINE

## 2023-11-11 PROCEDURE — 6370000000 HC RX 637 (ALT 250 FOR IP): Performed by: NURSE PRACTITIONER

## 2023-11-11 PROCEDURE — 99024 POSTOP FOLLOW-UP VISIT: CPT | Performed by: PHYSICIAN ASSISTANT

## 2023-11-11 PROCEDURE — 85027 COMPLETE CBC AUTOMATED: CPT

## 2023-11-11 PROCEDURE — 2500000003 HC RX 250 WO HCPCS

## 2023-11-11 PROCEDURE — 6360000002 HC RX W HCPCS: Performed by: INTERNAL MEDICINE

## 2023-11-11 PROCEDURE — 82948 REAGENT STRIP/BLOOD GLUCOSE: CPT

## 2023-11-11 PROCEDURE — 2500000003 HC RX 250 WO HCPCS: Performed by: EMERGENCY MEDICINE

## 2023-11-11 PROCEDURE — 94761 N-INVAS EAR/PLS OXIMETRY MLT: CPT

## 2023-11-11 PROCEDURE — 6370000000 HC RX 637 (ALT 250 FOR IP)

## 2023-11-11 PROCEDURE — 99233 SBSQ HOSP IP/OBS HIGH 50: CPT | Performed by: INTERNAL MEDICINE

## 2023-11-11 RX ADMIN — LEVETIRACETAM 1000 MG: 100 INJECTION, SOLUTION INTRAVENOUS at 17:52

## 2023-11-11 RX ADMIN — INSULIN GLARGINE 20 UNITS: 100 INJECTION, SOLUTION SUBCUTANEOUS at 20:57

## 2023-11-11 RX ADMIN — MICONAZOLE NITRATE: 2 POWDER TOPICAL at 20:58

## 2023-11-11 RX ADMIN — POLYETHYLENE GLYCOL 3350 17 G: 17 POWDER, FOR SOLUTION ORAL at 20:57

## 2023-11-11 RX ADMIN — FAMOTIDINE 20 MG: 20 TABLET ORAL at 20:58

## 2023-11-11 RX ADMIN — MICONAZOLE NITRATE: 2 POWDER TOPICAL at 08:54

## 2023-11-11 RX ADMIN — SODIUM CHLORIDE, PRESERVATIVE FREE 10 ML: 5 INJECTION INTRAVENOUS at 08:54

## 2023-11-11 RX ADMIN — ENOXAPARIN SODIUM 90 MG: 100 INJECTION SUBCUTANEOUS at 08:54

## 2023-11-11 RX ADMIN — MODAFINIL 200 MG: 100 TABLET ORAL at 08:54

## 2023-11-11 RX ADMIN — LEVETIRACETAM 1000 MG: 100 INJECTION, SOLUTION INTRAVENOUS at 05:33

## 2023-11-11 RX ADMIN — POLYETHYLENE GLYCOL 3350 17 G: 17 POWDER, FOR SOLUTION ORAL at 08:54

## 2023-11-11 RX ADMIN — CHLORHEXIDINE GLUCONATE 0.12% ORAL RINSE 15 ML: 1.2 LIQUID ORAL at 20:57

## 2023-11-11 RX ADMIN — CHLORHEXIDINE GLUCONATE 0.12% ORAL RINSE 15 ML: 1.2 LIQUID ORAL at 08:55

## 2023-11-11 RX ADMIN — ENOXAPARIN SODIUM 90 MG: 100 INJECTION SUBCUTANEOUS at 20:57

## 2023-11-11 RX ADMIN — SENNOSIDES 17.2 MG: 8.6 TABLET, FILM COATED ORAL at 20:58

## 2023-11-11 RX ADMIN — BUMETANIDE 1 MG: 0.25 INJECTION INTRAMUSCULAR; INTRAVENOUS at 08:54

## 2023-11-11 RX ADMIN — BISACODYL 10 MG: 10 SUPPOSITORY RECTAL at 20:58

## 2023-11-11 RX ADMIN — FOLIC ACID 1 MG: 5 INJECTION, SOLUTION INTRAMUSCULAR; INTRAVENOUS; SUBCUTANEOUS at 08:56

## 2023-11-11 RX ADMIN — BUMETANIDE 1 MG: 0.25 INJECTION INTRAMUSCULAR; INTRAVENOUS at 20:58

## 2023-11-11 RX ADMIN — SODIUM CHLORIDE, PRESERVATIVE FREE 10 ML: 5 INJECTION INTRAVENOUS at 20:58

## 2023-11-11 RX ADMIN — FAMOTIDINE 20 MG: 20 TABLET ORAL at 08:54

## 2023-11-11 ASSESSMENT — PULMONARY FUNCTION TESTS
PIF_VALUE: 14
PIF_VALUE: 16
PIF_VALUE: 15
PIF_VALUE: 16

## 2023-11-11 ASSESSMENT — PAIN SCALES - GENERAL: PAINLEVEL_OUTOF10: 0

## 2023-11-11 NOTE — PLAN OF CARE
Problem: Discharge Planning  Goal: Discharge to home or other facility with appropriate resources  Outcome: Not Progressing     Problem: Neurosensory - Adult  Goal: Achieves stable or improved neurological status  Outcome: Not Progressing     Problem: Chronic Conditions and Co-morbidities  Goal: Patient's chronic conditions and co-morbidity symptoms are monitored and maintained or improved  Outcome: Progressing     Problem: Safety - Adult  Goal: Free from fall injury  Outcome: Progressing     Problem: Pain  Goal: Verbalizes/displays adequate comfort level or baseline comfort level  Outcome: Progressing     Problem: Respiratory - Adult  Goal: Achieves optimal ventilation and oxygenation  Outcome: Progressing     Problem: Nutrition Deficit:  Goal: Optimize nutritional status  Outcome: Progressing     Problem: Skin/Tissue Integrity  Goal: Absence of new skin breakdown  Description: 1. Monitor for areas of redness and/or skin breakdown  2. Assess vascular access sites hourly  3. Every 4-6 hours minimum:  Change oxygen saturation probe site  4. Every 4-6 hours:  If on nasal continuous positive airway pressure, respiratory therapy assess nares and determine need for appliance change or resting period. Outcome: Progressing     Problem: Neurosensory - Adult  Goal: Absence of seizures  Outcome: Progressing     Problem: Skin/Tissue Integrity - Adult  Goal: Incisions, wounds, or drain sites healing without S/S of infection  Outcome: Progressing     Problem: Gastrointestinal - Adult  Goal: Maintains adequate nutritional intake  Outcome: Progressing     Problem: Genitourinary - Adult  Goal: Urinary catheter remains patent  Outcome: Progressing     Problem: Coping  Goal: Pt/Family able to verbalize concerns and demonstrate effective coping strategies  Description: INTERVENTIONS:  1. Assist patient/family to identify coping skills, available support systems and cultural and spiritual values  2.  Provide emotional support,

## 2023-11-11 NOTE — PLAN OF CARE
Problem: Respiratory - Adult  Goal: Normal spontaneous ventilation  Outcome: Progressing   Vent setting optimized to achieve target tidal volume, respiratory rate and ideal oxygen saturations. SBT will be performed when appropriate. Patient Weaning Progress      Spontaneous weaning trial  was attempted. *Results of SBT documented under SBTOUTCOME note. The patient was able to tolerate SBT. EtCO2 of 42 and SpO2 of 98 on 21% FiO2. Spontanteous VT was 531 and RR 13 breaths/min. The patient was on SBT for hours       Cuff was not  deflated to determine cuff leak. Unable to get agreement for goals because no family is present and patient cannot respond.      Ventilator spontaneous breathing trial outcome:                [x] Tolerated SBT and vitals are stable   [] Order to extubate entered by provider    [] SBT not tolerated    [] Respiratory distress    [] Decreased LOC    [] Vitals unstable    [] Agitation  [] Provider request

## 2023-11-11 NOTE — PROGRESS NOTES
CRITICAL CARE PROGRESS NOTE      Patient:  Manny Garcias    Unit/Bed:4D-08/008-A  YOB: 1950  MRN: [id number]   PCP: Zofia Ochoa MD  Date of Admission: 10/12/2023  Chief Complaint:-Tracheal stenosis status post resection    Assessment and Plan:    Anoxic brain injury: Secondary to respiratory arrest and subsequent cardiac arrest following failed extubation 10/16. EEG shows no seizures, creatinine is consistent with encephalopathy (moderate-severe). MRI brain 10/19: No evidence of anoxia. Neurological status appears to be the same as yesterday 11/9/2023. Patient does have coughing and gag, she responds to pain but does not follow commands. Positive Babinski. Patient was minimally opening her eyes this morning 11/11/2023. Continue Provigil 200 mg to promote wakefulness  Keppra discontinued. Peak neurological again would be next Friday, November 6, 17th  Continue to monitor for further signs of neurological improvement. Acute hypoxic respiratory failure: Patient is minimally vent dependent without sedation, barrier to extubation GCS of 4. She is s/p cricoid tracheal resection, laryngeal plasty with flap and focal lateral ventilation sedation placement with Dr. Aftab Kwok 10/12. Keep head of breath 30 degrees with pillow under head for semiflexed evaluation per ENT. a.  Due to neurological concerns and concern for patient's ability to maintain her airway she cannot be extubated at this time. Bilateral extremity DVTs, superficial: Venous duplex bilateral lower extremity 10/12: Thrombus in bilateral posterior tibial veins and right peroneal vein. Venous Doppler upper extremities 10/2020 shows left axillary, brachial, basilic, and cephalic vein thrombosis. Right cephalic and antecubital thrombosis. Continue therapeutic Lovenox  T2DM: 1/2023 A1c 5.5.   On high-dose sliding scale insulin  Chronic diastolic heart failure: Echo 1/2/2023 shows EF of 60 to 65%  Strict intake and

## 2023-11-11 NOTE — PROGRESS NOTES
Patient Weaning Progress    The patient's vent settings was able to be weaned this shift. Ventilator settings that were weaned              [x] Mode   [] Pressure support weaned   [] Fio2 weaned   [] Peep weaned      Spontaneous weaning trial  was attempted. The patient was able to tolerate SBT. RSBI  was 24 with EtCO2 of 41 and SpO2 of 99 on 21% FiO2. Spontanteous VT was 538 and RR 13 breaths/min. Unable to get agreement for goals because no family is present and patient cannot respond.

## 2023-11-12 LAB
ANION GAP SERPL CALC-SCNC: 8 MEQ/L (ref 8–16)
BUN SERPL-MCNC: 37 MG/DL (ref 7–22)
CALCIUM SERPL-MCNC: 9 MG/DL (ref 8.5–10.5)
CHLORIDE SERPL-SCNC: 104 MEQ/L (ref 98–111)
CO2 SERPL-SCNC: 29 MEQ/L (ref 23–33)
CREAT SERPL-MCNC: 0.6 MG/DL (ref 0.4–1.2)
DEPRECATED RDW RBC AUTO: 57 FL (ref 35–45)
ERYTHROCYTE [DISTWIDTH] IN BLOOD BY AUTOMATED COUNT: 16 % (ref 11.5–14.5)
GFR SERPL CREATININE-BSD FRML MDRD: > 60 ML/MIN/1.73M2
GLUCOSE BLD STRIP.AUTO-MCNC: 162 MG/DL (ref 70–108)
GLUCOSE BLD STRIP.AUTO-MCNC: 182 MG/DL (ref 70–108)
GLUCOSE BLD STRIP.AUTO-MCNC: 187 MG/DL (ref 70–108)
GLUCOSE BLD STRIP.AUTO-MCNC: 197 MG/DL (ref 70–108)
GLUCOSE BLD STRIP.AUTO-MCNC: 209 MG/DL (ref 70–108)
GLUCOSE BLD STRIP.AUTO-MCNC: 218 MG/DL (ref 70–108)
GLUCOSE SERPL-MCNC: 156 MG/DL (ref 70–108)
HCT VFR BLD AUTO: 24.6 % (ref 37–47)
HGB BLD-MCNC: 7.6 GM/DL (ref 12–16)
MCH RBC QN AUTO: 30 PG (ref 26–33)
MCHC RBC AUTO-ENTMCNC: 30.9 GM/DL (ref 32.2–35.5)
MCV RBC AUTO: 97.2 FL (ref 81–99)
PLATELET # BLD AUTO: 219 THOU/MM3 (ref 130–400)
PMV BLD AUTO: 10.1 FL (ref 9.4–12.4)
POTASSIUM SERPL-SCNC: 4.4 MEQ/L (ref 3.5–5.2)
RBC # BLD AUTO: 2.53 MILL/MM3 (ref 4.2–5.4)
SODIUM SERPL-SCNC: 141 MEQ/L (ref 135–145)
WBC # BLD AUTO: 8.6 THOU/MM3 (ref 4.8–10.8)

## 2023-11-12 PROCEDURE — 6370000000 HC RX 637 (ALT 250 FOR IP)

## 2023-11-12 PROCEDURE — 2580000003 HC RX 258: Performed by: EMERGENCY MEDICINE

## 2023-11-12 PROCEDURE — 2580000003 HC RX 258: Performed by: NURSE PRACTITIONER

## 2023-11-12 PROCEDURE — 2500000003 HC RX 250 WO HCPCS

## 2023-11-12 PROCEDURE — 85027 COMPLETE CBC AUTOMATED: CPT

## 2023-11-12 PROCEDURE — 6360000002 HC RX W HCPCS: Performed by: INTERNAL MEDICINE

## 2023-11-12 PROCEDURE — 94003 VENT MGMT INPAT SUBQ DAY: CPT

## 2023-11-12 PROCEDURE — 80048 BASIC METABOLIC PNL TOTAL CA: CPT

## 2023-11-12 PROCEDURE — 94761 N-INVAS EAR/PLS OXIMETRY MLT: CPT

## 2023-11-12 PROCEDURE — 82948 REAGENT STRIP/BLOOD GLUCOSE: CPT

## 2023-11-12 PROCEDURE — 36415 COLL VENOUS BLD VENIPUNCTURE: CPT

## 2023-11-12 PROCEDURE — 99024 POSTOP FOLLOW-UP VISIT: CPT | Performed by: PHYSICIAN ASSISTANT

## 2023-11-12 PROCEDURE — 2580000003 HC RX 258

## 2023-11-12 PROCEDURE — 99233 SBSQ HOSP IP/OBS HIGH 50: CPT | Performed by: INTERNAL MEDICINE

## 2023-11-12 PROCEDURE — 6370000000 HC RX 637 (ALT 250 FOR IP): Performed by: EMERGENCY MEDICINE

## 2023-11-12 PROCEDURE — 6370000000 HC RX 637 (ALT 250 FOR IP): Performed by: NURSE PRACTITIONER

## 2023-11-12 PROCEDURE — 2500000003 HC RX 250 WO HCPCS: Performed by: EMERGENCY MEDICINE

## 2023-11-12 PROCEDURE — 6360000002 HC RX W HCPCS: Performed by: EMERGENCY MEDICINE

## 2023-11-12 PROCEDURE — 2000000000 HC ICU R&B

## 2023-11-12 PROCEDURE — 2580000003 HC RX 258: Performed by: STUDENT IN AN ORGANIZED HEALTH CARE EDUCATION/TRAINING PROGRAM

## 2023-11-12 RX ORDER — 0.9 % SODIUM CHLORIDE 0.9 %
500 INTRAVENOUS SOLUTION INTRAVENOUS ONCE
Status: COMPLETED | OUTPATIENT
Start: 2023-11-12 | End: 2023-11-12

## 2023-11-12 RX ADMIN — MICONAZOLE NITRATE: 2 POWDER TOPICAL at 20:45

## 2023-11-12 RX ADMIN — POLYETHYLENE GLYCOL 3350 17 G: 17 POWDER, FOR SOLUTION ORAL at 20:46

## 2023-11-12 RX ADMIN — ENOXAPARIN SODIUM 90 MG: 100 INJECTION SUBCUTANEOUS at 08:16

## 2023-11-12 RX ADMIN — MODAFINIL 200 MG: 100 TABLET ORAL at 08:16

## 2023-11-12 RX ADMIN — SODIUM CHLORIDE, PRESERVATIVE FREE 10 ML: 5 INJECTION INTRAVENOUS at 08:38

## 2023-11-12 RX ADMIN — SODIUM CHLORIDE 500 ML: 9 INJECTION, SOLUTION INTRAVENOUS at 07:20

## 2023-11-12 RX ADMIN — LEVETIRACETAM 1000 MG: 100 INJECTION, SOLUTION INTRAVENOUS at 05:51

## 2023-11-12 RX ADMIN — CALCIUM POLYCARBOPHIL 625 MG: 625 TABLET, FILM COATED ORAL at 08:17

## 2023-11-12 RX ADMIN — FAMOTIDINE 20 MG: 20 TABLET ORAL at 20:46

## 2023-11-12 RX ADMIN — LEVETIRACETAM 1000 MG: 100 INJECTION, SOLUTION INTRAVENOUS at 18:02

## 2023-11-12 RX ADMIN — SODIUM CHLORIDE, PRESERVATIVE FREE 10 ML: 5 INJECTION INTRAVENOUS at 08:28

## 2023-11-12 RX ADMIN — FOLIC ACID 1 MG: 5 INJECTION, SOLUTION INTRAMUSCULAR; INTRAVENOUS; SUBCUTANEOUS at 08:42

## 2023-11-12 RX ADMIN — SODIUM CHLORIDE, PRESERVATIVE FREE 10 ML: 5 INJECTION INTRAVENOUS at 20:46

## 2023-11-12 RX ADMIN — BUMETANIDE 1 MG: 0.25 INJECTION INTRAMUSCULAR; INTRAVENOUS at 08:17

## 2023-11-12 RX ADMIN — SODIUM CHLORIDE, PRESERVATIVE FREE 10 ML: 5 INJECTION INTRAVENOUS at 08:30

## 2023-11-12 RX ADMIN — BUMETANIDE 1 MG: 0.25 INJECTION INTRAMUSCULAR; INTRAVENOUS at 20:46

## 2023-11-12 RX ADMIN — BISACODYL 10 MG: 10 SUPPOSITORY RECTAL at 20:46

## 2023-11-12 RX ADMIN — MICONAZOLE NITRATE: 2 POWDER TOPICAL at 08:44

## 2023-11-12 RX ADMIN — ENOXAPARIN SODIUM 90 MG: 100 INJECTION SUBCUTANEOUS at 20:46

## 2023-11-12 RX ADMIN — SODIUM CHLORIDE 500 ML: 9 INJECTION, SOLUTION INTRAVENOUS at 04:20

## 2023-11-12 RX ADMIN — SENNOSIDES 17.2 MG: 8.6 TABLET, FILM COATED ORAL at 20:46

## 2023-11-12 RX ADMIN — FAMOTIDINE 20 MG: 20 TABLET ORAL at 08:16

## 2023-11-12 RX ADMIN — POLYETHYLENE GLYCOL 3350 17 G: 17 POWDER, FOR SOLUTION ORAL at 08:48

## 2023-11-12 RX ADMIN — CHLORHEXIDINE GLUCONATE 0.12% ORAL RINSE 15 ML: 1.2 LIQUID ORAL at 08:16

## 2023-11-12 RX ADMIN — INSULIN GLARGINE 20 UNITS: 100 INJECTION, SOLUTION SUBCUTANEOUS at 20:46

## 2023-11-12 RX ADMIN — CHLORHEXIDINE GLUCONATE 0.12% ORAL RINSE 15 ML: 1.2 LIQUID ORAL at 20:46

## 2023-11-12 ASSESSMENT — PULMONARY FUNCTION TESTS
PIF_VALUE: 13
PIF_VALUE: 14
PIF_VALUE: 16

## 2023-11-12 ASSESSMENT — PAIN SCALES - GENERAL
PAINLEVEL_OUTOF10: 0
PAINLEVEL_OUTOF10: 0

## 2023-11-12 NOTE — PLAN OF CARE
Problem: Respiratory - Adult  Goal: Achieves optimal ventilation and oxygenation  11/12/2023 1800 by Joella Lesches L, RCP  Outcome: Progressing   Vent setting optimized to achieve target tidal volume, respiratory rate and ideal oxygen saturations. SBT will be performed when appropriate. Patient Weaning Progress    The patient's vent settings was not able to be weaned this shift. Pt is on minimal ventilator settings. Spontaneous weaning trial  was attempted. *Results of SBT documented under SBTOUTCOME note. The patient was able to tolerate SBT. Unable to get agreement for goals because no family is present and patient cannot respond.      Ventilator spontaneous breathing trial outcome:                [x] Tolerated SBT and vitals are stable   [] Order to extubate entered by provider    [] SBT not tolerated    [] Respiratory distress    [] Decreased LOC    [] Vitals unstable    [] Agitation  [] Provider request

## 2023-11-12 NOTE — PLAN OF CARE
Problem: Discharge Planning  Goal: Discharge to home or other facility with appropriate resources  Outcome: Progressing  Flowsheets (Taken 11/11/2023 2000)  Discharge to home or other facility with appropriate resources:   Identify barriers to discharge with patient and caregiver   Arrange for needed discharge resources and transportation as appropriate   Identify discharge learning needs (meds, wound care, etc)     Problem: Chronic Conditions and Co-morbidities  Goal: Patient's chronic conditions and co-morbidity symptoms are monitored and maintained or improved  Outcome: Progressing  Flowsheets (Taken 11/11/2023 2000)  Care Plan - Patient's Chronic Conditions and Co-Morbidity Symptoms are Monitored and Maintained or Improved:   Monitor and assess patient's chronic conditions and comorbid symptoms for stability, deterioration, or improvement   Collaborate with multidisciplinary team to address chronic and comorbid conditions and prevent exacerbation or deterioration   Update acute care plan with appropriate goals if chronic or comorbid symptoms are exacerbated and prevent overall improvement and discharge     Problem: Safety - Adult  Goal: Free from fall injury  Outcome: Progressing     Problem: Pain  Goal: Verbalizes/displays adequate comfort level or baseline comfort level  Outcome: Progressing  Flowsheets (Taken 11/11/2023 2000)  Verbalizes/displays adequate comfort level or baseline comfort level:   Encourage patient to monitor pain and request assistance   Assess pain using appropriate pain scale   Administer analgesics based on type and severity of pain and evaluate response   Implement non-pharmacological measures as appropriate and evaluate response   Consider cultural and social influences on pain and pain management   Notify Licensed Independent Practitioner if interventions unsuccessful or patient reports new pain     Problem: Respiratory - Adult  Goal: Achieves optimal ventilation and associated apnea   If seizure occurs, turn head to side and suction secretions as needed   Administer anticonvulsants as ordered   Support airway/breathing, administer oxygen as needed   Diagnostic studies as ordered     Problem: Skin/Tissue Integrity - Adult  Goal: Incisions, wounds, or drain sites healing without S/S of infection  Outcome: Progressing  Flowsheets (Taken 11/11/2023 2000)  Incisions, Wounds, or Drain Sites Healing Without Sign and Symptoms of Infection:   ADMISSION and DAILY: Assess and document risk factors for pressure ulcer development   TWICE DAILY: Assess and document skin integrity   TWICE DAILY: Assess and document dressing/incision, wound bed, drain sites and surrounding tissue   Implement wound care per orders   Initiate isolation precautions as appropriate   Initiate pressure ulcer prevention bundle as indicated     Problem: Gastrointestinal - Adult  Goal: Maintains adequate nutritional intake  Outcome: Progressing  Flowsheets (Taken 11/11/2023 2000)  Maintains adequate nutritional intake:   Monitor percentage of each meal consumed   Identify factors contributing to decreased intake, treat as appropriate   Assist with meals as needed   Monitor intake and output, weight and lab values   Obtain nutritional consult as needed     Problem: Genitourinary - Adult  Goal: Urinary catheter remains patent  Outcome: Progressing  Flowsheets (Taken 11/11/2023 2000)  Urinary catheter remains patent:   Assess patency of urinary catheter   Irrigate catheter per Licensed Independent Practitioner order if indicated and notify Licensed Independent Practitioner if unable to irrigate   Assess need for a larger catheter size or a 3-way catheter for continuous bladder irrigation     Problem: Coping  Goal: Pt/Family able to verbalize concerns and demonstrate effective coping strategies  Description: INTERVENTIONS:  1.  Assist patient/family to identify coping skills, available support systems and cultural and spiritual values  2. Provide emotional support, including active listening and acknowledgement of concerns of patient and caregivers  3. Reduce environmental stimuli, as able  4. Instruct patient/family in relaxation techniques, as appropriate  5.  Assess for spiritual pain/suffering and initiate Spiritual Care, Psychosocial Clinical Specialist consults as needed  Outcome: Progressing  Flowsheets (Taken 11/11/2023 2000)  Patient/family able to verbalize anxieties, fears, and concerns, and demonstrate effective coping:   Assist patient/family to identify coping skills, available support systems and cultural and spiritual values   Provide emotional support, including active listening and acknowledgement of concerns of patient and caregivers   Reduce environmental stimuli, as able   Instruct patient/family in relaxation techniques, as appropriate   Assess for spiritual pain/suffering and initiate Spiritual Care, Psychosocial Clinical Specialist consults as needed     Problem: Cardiovascular - Adult  Goal: Maintains optimal cardiac output and hemodynamic stability  Outcome: Progressing  Flowsheets (Taken 11/11/2023 2000)  Maintains optimal cardiac output and hemodynamic stability:   Monitor urine output and notify Licensed Independent Practitioner for values outside of normal range   Monitor blood pressure and heart rate   Assess for signs of decreased cardiac output   Administer fluid and/or volume expanders as ordered     Problem: ABCDS Injury Assessment  Goal: Absence of physical injury  Outcome: Progressing

## 2023-11-12 NOTE — PROGRESS NOTES
CRITICAL CARE PROGRESS NOTE      Patient:  Martha Fitting    Unit/Bed:4D-08/008-A  YOB: 1950  MRN: [id number]   PCP: Cat Strauss MD  Date of Admission: 10/12/2023  Chief Complaint:- Tracheal stenosis status post resection    Assessment and Plan:    Anoxic brain injury: Secondary to respiratory arrest and subsequent cardiac arrest following failed extubation 10/16. EEG shows no seizures, creatinine is consistent with encephalopathy (moderate-severe). MRI brain 10/19: No evidence of anoxia. Neurological status appears to be the same as yesterday 11/9/2023. Patient does have coughing and gag, she responds to pain but does not follow commands. Positive Babinski. Patient was minimally opening her eyes this morning 11/11/2023. Continue Provigil 200 mg to promote wakefulness  Peak neurological again would be next Friday, November 17th  Continue to monitor for further signs of neurological improvement. Acute hypoxic respiratory failure: Patient is minimally vent dependent without sedation, barrier to extubation GCS of 4. She is s/p cricoid tracheal resection, laryngeal plasty with flap and focal lateral ventilation sedation placement with Dr. Franco Ear 10/12. Keep head of breath 30 degrees with pillow under head for semiflexed evaluation per ENT. a.  Due to neurological concerns and concern for patient's ability to maintain her airway she cannot be extubated at this time. Bilateral extremity DVTs, superficial: Venous duplex bilateral lower extremity 10/12: Thrombus in bilateral posterior tibial veins and right peroneal vein. Venous Doppler upper extremities 10/2020 shows left axillary, brachial, basilic, and cephalic vein thrombosis. Right cephalic and antecubital thrombosis. Continue therapeutic Lovenox  T2DM: 1/2023 A1c 5.5.   On high-dose sliding scale insulin  Chronic diastolic heart failure: Echo 1/2/2023 shows EF of 60 to 65%  Strict intake and output  Bumex 1 mg twice daily  Hypertension: Continue home meds  Leukocytosis: Resolved    INITIAL H AND P AND ICU COURSE:  Patient is a 27-year-old female in ICU for close airway monitoring. She is status post segmental or critical tracheal resection. History of infection in the status post trach (tracheal stenosis due to tracheostomy; tracheostomy, tracheostomy dependent,; posterior glottic stenosis. Patient had ET tube exchanged on 7/18/2023. She had respiratory arrest and cardiac arrest 10/16 following a failed extubation. EEG done shows no seizures. MRI of the brain 10/19 no evidence of anoxia, no evidence of acute infarct. Patient has a cough and gag, extends to pain however does not follow commands. Positive Babinski. We will continue to monitor neurological status. Past Medical History: Arthritis, CAD, diabetic neuropathy, DVTs, hyperlipidemia, hypertension, type II DM. Family History: Father: Parkinson's disease, lung cancer. Social History: No smoking history, no recent alcohol use. ROS   Unable to obtain as patient is intubated. Scheduled Meds:   polyethylene glycol  17 g Oral BID    bisacodyl  10 mg Rectal Daily    folic acid 1 mg in sodium chloride 0.9 % 50 mL IVPB  1 mg IntraVENous Daily    insulin glargine  20 Units SubCUTAneous Nightly    senna  2 tablet Oral Nightly    bumetanide  1 mg IntraVENous BID    enoxaparin  1 mg/kg SubCUTAneous Q12H    miconazole   Topical BID    insulin lispro  0-16 Units SubCUTAneous Q6H    [Held by provider] carvedilol  6.25 mg Orogastric BID WC    [Held by provider] amLODIPine  5 mg Orogastric Daily    famotidine  20 mg Oral BID    modafinil  200 mg Oral Daily    levETIRAcetam  1,000 mg IntraVENous Q12H    sodium chloride flush  5-40 mL IntraVENous 2 times per day    chlorhexidine  15 mL Mouth/Throat BID     Continuous Infusions:   dextrose         PHYSICAL EXAMINATION:  T:  99.1. P:  81. RR:  16. B/P:  110/51.   FiO2:  21. O2 Sat:  92.  I/O:  +852  Body mass index is 34.19 No results for input(s): \"AST\", \"ALT\", \"ALB\", \"BILITOT\", \"ALKPHOS\", \"AMYLASE\", \"LIPASE\" in the last 72 hours. Cardiac Enzymes: No results for input(s): \"CKTOTAL\", \"CKMB\", \"TROPONINI\" in the last 72 hours. BNP: No results for input(s): \"BNP\" in the last 72 hours. INR: No results for input(s): \"INR\", \"PROTIME\" in the last 72 hours. POC   Recent Labs     11/11/23  1221 11/11/23 2029 11/11/23  2322   POCGLU 157* 168* 188*     No results for input(s): \"LACTA\" in the last 72 hours. dextrose          polyethylene glycol  17 g Oral BID    bisacodyl  10 mg Rectal Daily    folic acid 1 mg in sodium chloride 0.9 % 50 mL IVPB  1 mg IntraVENous Daily    insulin glargine  20 Units SubCUTAneous Nightly    senna  2 tablet Oral Nightly    bumetanide  1 mg IntraVENous BID    enoxaparin  1 mg/kg SubCUTAneous Q12H    miconazole   Topical BID    insulin lispro  0-16 Units SubCUTAneous Q6H    [Held by provider] carvedilol  6.25 mg Orogastric BID WC    [Held by provider] amLODIPine  5 mg Orogastric Daily    famotidine  20 mg Oral BID    modafinil  200 mg Oral Daily    levETIRAcetam  1,000 mg IntraVENous Q12H    sodium chloride flush  5-40 mL IntraVENous 2 times per day    chlorhexidine  15 mL Mouth/Throat BID       24HR INTAKE/OUTPUT:    Intake/Output Summary (Last 24 hours) at 11/12/2023 1126  Last data filed at 11/12/2023 7732  Gross per 24 hour   Intake 3702.88 ml   Output 2850 ml   Net 852.88 ml       PUD and DVT prophylaxis reviewed.   Lovenox 90 mg subcu every 12 hours  Pepcid 20 mg p.o. twice daily    Meets Continued ICU Level Care Criteria:    [x] Yes     Electronically signed by   Shelly Ortiz MD on 11/12/2023 at 11:26 AM

## 2023-11-12 NOTE — PLAN OF CARE
Problem: Respiratory - Adult  Goal: Normal spontaneous ventilation  Outcome: Progressing     Problem: Respiratory - Adult  Goal: Achieves optimal ventilation and oxygenation  11/12/2023 0520 by Nisha Lawler RCP  Outcome: Progressing                                                Patient Weaning Progress    The patient's vent settings was able to be weaned this shift. Ventilator settings that were weaned              [] Mode   [] Pressure support weaned   [] Fio2 weaned   [] Peep weaned      Spontaneous weaning trial  was attempted. due to defined parameters for SBT (spontaneous breathing trial) not being met. *Results of SBT documented under SBTOUTCOME note. Reason that defined ventilator parameters for SBT was not met              [] Patient condition requires increased ventilator settings  [] Requires increased sedation   [] Settings not within weaning range   [] SAT not completed   [] Physician orders    The patient was able to tolerate SBT. RSBI  was 32 with EtCO2 of 44 and SpO2 of 99 on 30% FiO2. Spontanteous VT was 530 and RR 14 breaths/min. Evac tube was  hooked up with continuous low suction(20-30mmHg)        Unable to get agreement for goals because no family is present and patient cannot respond.

## 2023-11-13 LAB
ANION GAP SERPL CALC-SCNC: 9 MEQ/L (ref 8–16)
BUN SERPL-MCNC: 36 MG/DL (ref 7–22)
CALCIUM SERPL-MCNC: 8.9 MG/DL (ref 8.5–10.5)
CHLORIDE SERPL-SCNC: 101 MEQ/L (ref 98–111)
CO2 SERPL-SCNC: 28 MEQ/L (ref 23–33)
CREAT SERPL-MCNC: 0.5 MG/DL (ref 0.4–1.2)
DEPRECATED RDW RBC AUTO: 57.7 FL (ref 35–45)
ERYTHROCYTE [DISTWIDTH] IN BLOOD BY AUTOMATED COUNT: 16.3 % (ref 11.5–14.5)
GFR SERPL CREATININE-BSD FRML MDRD: > 60 ML/MIN/1.73M2
GLUCOSE BLD STRIP.AUTO-MCNC: 182 MG/DL (ref 70–108)
GLUCOSE BLD STRIP.AUTO-MCNC: 188 MG/DL (ref 70–108)
GLUCOSE BLD STRIP.AUTO-MCNC: 231 MG/DL (ref 70–108)
GLUCOSE SERPL-MCNC: 180 MG/DL (ref 70–108)
HCT VFR BLD AUTO: 22.6 % (ref 37–47)
HGB BLD-MCNC: 7.1 GM/DL (ref 12–16)
MCH RBC QN AUTO: 30.5 PG (ref 26–33)
MCHC RBC AUTO-ENTMCNC: 31.4 GM/DL (ref 32.2–35.5)
MCV RBC AUTO: 97 FL (ref 81–99)
PLATELET # BLD AUTO: 210 THOU/MM3 (ref 130–400)
PMV BLD AUTO: 10 FL (ref 9.4–12.4)
POTASSIUM SERPL-SCNC: 4.1 MEQ/L (ref 3.5–5.2)
RBC # BLD AUTO: 2.33 MILL/MM3 (ref 4.2–5.4)
SODIUM SERPL-SCNC: 138 MEQ/L (ref 135–145)
WBC # BLD AUTO: 8.1 THOU/MM3 (ref 4.8–10.8)

## 2023-11-13 PROCEDURE — 99024 POSTOP FOLLOW-UP VISIT: CPT | Performed by: REGISTERED NURSE

## 2023-11-13 PROCEDURE — 2580000003 HC RX 258: Performed by: NURSE PRACTITIONER

## 2023-11-13 PROCEDURE — 6360000002 HC RX W HCPCS: Performed by: INTERNAL MEDICINE

## 2023-11-13 PROCEDURE — 6360000002 HC RX W HCPCS: Performed by: EMERGENCY MEDICINE

## 2023-11-13 PROCEDURE — 6370000000 HC RX 637 (ALT 250 FOR IP): Performed by: EMERGENCY MEDICINE

## 2023-11-13 PROCEDURE — 94761 N-INVAS EAR/PLS OXIMETRY MLT: CPT

## 2023-11-13 PROCEDURE — 2580000003 HC RX 258: Performed by: EMERGENCY MEDICINE

## 2023-11-13 PROCEDURE — 2500000003 HC RX 250 WO HCPCS

## 2023-11-13 PROCEDURE — 99291 CRITICAL CARE FIRST HOUR: CPT | Performed by: INTERNAL MEDICINE

## 2023-11-13 PROCEDURE — 6370000000 HC RX 637 (ALT 250 FOR IP): Performed by: INTERNAL MEDICINE

## 2023-11-13 PROCEDURE — 36415 COLL VENOUS BLD VENIPUNCTURE: CPT

## 2023-11-13 PROCEDURE — 2000000000 HC ICU R&B

## 2023-11-13 PROCEDURE — 2580000003 HC RX 258: Performed by: INTERNAL MEDICINE

## 2023-11-13 PROCEDURE — 6370000000 HC RX 637 (ALT 250 FOR IP)

## 2023-11-13 PROCEDURE — 85027 COMPLETE CBC AUTOMATED: CPT

## 2023-11-13 PROCEDURE — 2700000000 HC OXYGEN THERAPY PER DAY

## 2023-11-13 PROCEDURE — 6370000000 HC RX 637 (ALT 250 FOR IP): Performed by: NURSE PRACTITIONER

## 2023-11-13 PROCEDURE — 94003 VENT MGMT INPAT SUBQ DAY: CPT

## 2023-11-13 PROCEDURE — 2500000003 HC RX 250 WO HCPCS: Performed by: EMERGENCY MEDICINE

## 2023-11-13 PROCEDURE — 82948 REAGENT STRIP/BLOOD GLUCOSE: CPT

## 2023-11-13 PROCEDURE — 80048 BASIC METABOLIC PNL TOTAL CA: CPT

## 2023-11-13 PROCEDURE — 6370000000 HC RX 637 (ALT 250 FOR IP): Performed by: REGISTERED NURSE

## 2023-11-13 RX ORDER — OXYMETAZOLINE HYDROCHLORIDE 0.05 G/100ML
3 SPRAY NASAL 3 TIMES DAILY
Status: COMPLETED | OUTPATIENT
Start: 2023-11-13 | End: 2023-11-15

## 2023-11-13 RX ADMIN — FAMOTIDINE 20 MG: 20 TABLET ORAL at 20:45

## 2023-11-13 RX ADMIN — FAMOTIDINE 20 MG: 20 TABLET ORAL at 09:00

## 2023-11-13 RX ADMIN — SALINE NASAL SPRAY 2 SPRAY: 1.5 SOLUTION NASAL at 12:14

## 2023-11-13 RX ADMIN — OXYMETAZOLINE HCL 3 SPRAY: 0.05 SPRAY NASAL at 20:52

## 2023-11-13 RX ADMIN — MICONAZOLE NITRATE: 2 POWDER TOPICAL at 20:46

## 2023-11-13 RX ADMIN — LEVETIRACETAM 1000 MG: 100 INJECTION, SOLUTION INTRAVENOUS at 05:35

## 2023-11-13 RX ADMIN — SODIUM CHLORIDE, PRESERVATIVE FREE 10 ML: 5 INJECTION INTRAVENOUS at 08:44

## 2023-11-13 RX ADMIN — LEVETIRACETAM 500 MG: 100 INJECTION, SOLUTION INTRAVENOUS at 18:05

## 2023-11-13 RX ADMIN — SODIUM CHLORIDE, PRESERVATIVE FREE 10 ML: 5 INJECTION INTRAVENOUS at 20:47

## 2023-11-13 RX ADMIN — CHLORHEXIDINE GLUCONATE 0.12% ORAL RINSE 15 ML: 1.2 LIQUID ORAL at 20:46

## 2023-11-13 RX ADMIN — OXYMETAZOLINE HCL 3 SPRAY: 0.05 SPRAY NASAL at 12:13

## 2023-11-13 RX ADMIN — MICONAZOLE NITRATE: 2 POWDER TOPICAL at 08:43

## 2023-11-13 RX ADMIN — CHLORHEXIDINE GLUCONATE 0.12% ORAL RINSE 15 ML: 1.2 LIQUID ORAL at 08:29

## 2023-11-13 RX ADMIN — FOLIC ACID 1 MG: 5 INJECTION, SOLUTION INTRAMUSCULAR; INTRAVENOUS; SUBCUTANEOUS at 09:25

## 2023-11-13 RX ADMIN — BUMETANIDE 1 MG: 0.25 INJECTION INTRAMUSCULAR; INTRAVENOUS at 20:46

## 2023-11-13 RX ADMIN — INSULIN GLARGINE 20 UNITS: 100 INJECTION, SOLUTION SUBCUTANEOUS at 20:49

## 2023-11-13 RX ADMIN — BUMETANIDE 1 MG: 0.25 INJECTION INTRAMUSCULAR; INTRAVENOUS at 08:57

## 2023-11-13 RX ADMIN — ENOXAPARIN SODIUM 90 MG: 100 INJECTION SUBCUTANEOUS at 09:05

## 2023-11-13 RX ADMIN — ENOXAPARIN SODIUM 90 MG: 100 INJECTION SUBCUTANEOUS at 20:45

## 2023-11-13 RX ADMIN — MODAFINIL 200 MG: 100 TABLET ORAL at 09:00

## 2023-11-13 RX ADMIN — INSULIN LISPRO 4 UNITS: 100 INJECTION, SOLUTION INTRAVENOUS; SUBCUTANEOUS at 18:43

## 2023-11-13 RX ADMIN — POLYETHYLENE GLYCOL 3350 17 G: 17 POWDER, FOR SOLUTION ORAL at 09:00

## 2023-11-13 RX ADMIN — OXYMETAZOLINE HCL 3 SPRAY: 0.05 SPRAY NASAL at 15:02

## 2023-11-13 RX ADMIN — SALINE NASAL SPRAY 2 SPRAY: 1.5 SOLUTION NASAL at 20:54

## 2023-11-13 RX ADMIN — SODIUM CHLORIDE, PRESERVATIVE FREE 10 ML: 5 INJECTION INTRAVENOUS at 08:43

## 2023-11-13 ASSESSMENT — PULMONARY FUNCTION TESTS
PIF_VALUE: 14
PIF_VALUE: 13
PIF_VALUE: 14

## 2023-11-13 ASSESSMENT — PAIN SCALES - GENERAL: PAINLEVEL_OUTOF10: 0

## 2023-11-13 NOTE — PLAN OF CARE
Problem: Respiratory - Adult  Goal: Normal spontaneous ventilation  Outcome: Progressing     Problem: Respiratory - Adult  Goal: Achieves optimal ventilation and oxygenation  11/13/2023 0527 by Hamida Garcia RCP  Outcome: Progressing                                                Patient Weaning Progress    The patient's vent settings was not able to be weaned this shift. Ventilator settings that were weaned              [] Mode   [] Pressure support weaned   [] Fio2 weaned   [] Peep weaned      Spontaneous weaning trial  was attempted. due to defined parameters for SBT (spontaneous breathing trial) not being met. *Results of SBT documented under SBTOUTCOME note. Reason that defined ventilator parameters for SBT was not met              [] Patient condition requires increased ventilator settings  [] Requires increased sedation   [] Settings not within weaning range   [] SAT not completed   [] Physician orders    The patient was able to tolerate SBT. The patient was on SBT for > 24 hours. Evac tube was  hooked up with continuous low suction(20-30mmHg)        Unable to get agreement for goals because no family is present and patient cannot respond.

## 2023-11-13 NOTE — PROGRESS NOTES
Comprehensive Nutrition Assessment    Type and Reason for Visit:  Reassess (Tube Feed Monitor)    Nutrition Recommendations/Plan:   Continue current tube feed regimen: Vital 1.2 bolus 215 ml every 4 hours to better meet nutritional needs. Free water flush 30 ml before and after each bolus (or per provider). Recommend continue scheduled bowel meds as ordered, previously went 12 days without BM      Malnutrition Assessment:  Malnutrition Status:  Insufficient data (10/13/23 1128)    Context:  Chronic Illness     Findings of the 6 clinical characteristics of malnutrition:  Energy Intake:  Unable to assess (pt. intubated)  Weight Loss:  Greater than 10% over 6 months (31# or 16% in 5 months)     Body Fat Loss:  Unable to assess (facial edema; RN asked not to disturb pt. this am)     Muscle Mass Loss:  No significant muscle mass loss Temples (temporalis), Clavicles (pectoralis & deltoids)  Fluid Accumulation:  Mild     Strength:  Not Performed    Nutrition Assessment:     Pt improving from a nutritional standpoint AEB tolerating Vital 1.2 bolus tube feed at goal while NPO, intubated. 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       Nutrition Related Findings:    Pt. Report/Treatments/Miscellaneous: intubated, bolus feeds per ENT service, tolerating tube feeding per RN  GI Status: hyperactive bowel sounds per RN, small stool overnight, previous BM on 11/10 which was first BM in 12 days  Pertinent Labs: Sodium 138, Potassium 4.1, BUN 36, Creatinine 0.5, Glucose 180  Pertinent Meds: dulcolax, bumex, pepcid, folic acid, lantus, humalog, keppra, glycolax, senokot, prn fibercon     Wound Type: Pressure Injury, Stage II (coccyx; stage

## 2023-11-13 NOTE — PLAN OF CARE
Problem: Respiratory - Adult  Goal: Normal spontaneous ventilation  11/13/2023 1008 by Earlene Hoyt RCP  Outcome: Progressing     Problem: Respiratory - Adult  Goal: Achieves optimal ventilation and oxygenation  11/13/2023 1008 by Earlene Hoyt RCP  Outcome: Progressing   Patient remains on the ventilator continuing to wean as tolerated. Patient Weaning Progress    The patient's vent settings was not able to be weaned this shift. Ventilator settings that were weaned              [] Mode   [] Pressure support weaned   [] Fio2 weaned   [] Peep weaned      Spontaneous weaning trial  was attempted. due to defined parameters for SBT (spontaneous breathing trial) not being met. *Results of SBT documented under SBTOUTCOME note. Reason that defined ventilator parameters for SBT was not met              [] Patient condition requires increased ventilator settings  [] Requires increased sedation   [] Settings not within weaning range   [] SAT not completed   [] Physician orders    Cuff was not  deflated to determine cuff leak. Unable to get agreement for goals because no family is present and patient cannot respond.

## 2023-11-13 NOTE — PLAN OF CARE
Problem: Discharge Planning  Goal: Discharge to home or other facility with appropriate resources  Outcome: Progressing  Flowsheets (Taken 11/12/2023 1920)  Discharge to home or other facility with appropriate resources:   Identify barriers to discharge with patient and caregiver   Arrange for needed discharge resources and transportation as appropriate   Identify discharge learning needs (meds, wound care, etc)     Problem: Chronic Conditions and Co-morbidities  Goal: Patient's chronic conditions and co-morbidity symptoms are monitored and maintained or improved  Outcome: Progressing  Flowsheets (Taken 11/12/2023 1920)  Care Plan - Patient's Chronic Conditions and Co-Morbidity Symptoms are Monitored and Maintained or Improved:   Monitor and assess patient's chronic conditions and comorbid symptoms for stability, deterioration, or improvement   Collaborate with multidisciplinary team to address chronic and comorbid conditions and prevent exacerbation or deterioration   Update acute care plan with appropriate goals if chronic or comorbid symptoms are exacerbated and prevent overall improvement and discharge     Problem: Safety - Adult  Goal: Free from fall injury  Outcome: Progressing     Problem: Pain  Goal: Verbalizes/displays adequate comfort level or baseline comfort level  Outcome: Progressing  Flowsheets (Taken 11/12/2023 1920)  Verbalizes/displays adequate comfort level or baseline comfort level:   Encourage patient to monitor pain and request assistance   Assess pain using appropriate pain scale   Administer analgesics based on type and severity of pain and evaluate response   Implement non-pharmacological measures as appropriate and evaluate response   Consider cultural and social influences on pain and pain management   Notify Licensed Independent Practitioner if interventions unsuccessful or patient reports new pain     Problem: Respiratory - Adult  Goal: Achieves optimal ventilation and

## 2023-11-13 NOTE — CARE COORDINATION
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 There is also nonocclusive thrombus within the left posterior tibial vein  10/16 Extubated to bipap  10/16 Code Blue  10/16 Reintubated  10/16 Bronch: thick bloody secretions in RLL removed  10/16 CXR: Mild stable cardiomegaly with pulmonary vascular congestion suspicious for congestive heart failure; Prominent pulmonary interstitium suspicious for pulmonary edema  10/17 5 hr EEG: No seizures noted; diffuse background attenuation and suppression without evidence of reactivity; severe encephalopathy  10/17 CT Head: Stable CT appearance the brain. No acute findings  10/17 MRI Brain: No acute findings  97/50 PICC left basilic  29/64 CT Head: No acute findings  10/18 OR: ET Exchange bronhoscopy and lavage  10/19 4hr 40 min EEG: This EEG was abnormal. The background abnormalities indicated a moderate to severe encephalopathy, nonspecific to etiology. The generalized periodic discharges (GPDs) indicated underlying cortical irritability/increased risk of seizures, and can be seen in various encephalopathies. No seizures were recorded  10/19 MRI Brain: No evidence of an acute infarct. No evidence of anoxia; Stable mild severity chronic small vessel ischemic changes; Global volume loss  10/23 4-day EEG: Moderate to severe encephalopathy, improved as recording progressed.  Frequent and at times near continuous runs of GPDs with often typical and at times atypical triphasic morphology, activated with stimulation at the beginning of recording, consistent with SIRPIDs   10/23 BUE Venous Dopplers: There is noncompressibility and absent flow, likely secondary to acute thrombus, in the left axillary, brachial, basilic and cephalic veins. There is partial flow and compressibility in the left subclavian vein. There is noncompressibility and absent flow in the right cephalic and antecubital veins. Remaining vein segments demonstrate normal compressibility and flow  11/2 BUE Venous dopplers: There is acute appearing DVT in the left axillary vein; Chronic appearing DVT is present in the distal left subclavian vein; Superficial thrombophlebitis involving the left basilic vein; Overall this examination has improved since the prior exam dated 10/23/2023    Barriers to Discharge: Intensivist and ENT following. Plan to monitor for neurologic recovery and re-evaluate on Friday 11/17. Remains on vent with ETT: cpap, 25% fio2, peep 6. Dietitian and wound care. IV bumex. IV folic acid. SSI. Lantus. Provigil. IV keppra. glycolax, scopolamine patch, senna, Electrolyte replacement protocols. Pain and nausea control. Unable to follow commands, CORTEZ x4 to painful stim, decerebrate x4. PCP: Stephanie Campbell MD  Readmission Risk Score: 21.4%  Patient Goals/Plan/Treatment Preferences: From home with , plans pending progress.

## 2023-11-13 NOTE — PLAN OF CARE
Problem: Respiratory - Adult  Goal: Achieves optimal ventilation and oxygenation  Outcome: Not Progressing  Flowsheets  Achieves optimal ventilation and oxygenation:   Assess for changes in respiratory status   Position to facilitate oxygenation and minimize respiratory effort   Assess the need for suctioning and aspirate as needed   Respiratory therapy support as indicated   Assess for changes in mentation and behavior   Oxygen supplementation based on oxygen saturation or arterial blood gases   Encourage broncho-pulmonary hygiene including cough, deep breathe, incentive spirometry   Assess and instruct to report shortness of breath or any respiratory difficulty  Note: Intubated/Vented. Wean as tolerated. SBT when appropriate.

## 2023-11-14 LAB
ABO: NORMAL
ANION GAP SERPL CALC-SCNC: 8 MEQ/L (ref 8–16)
BUN SERPL-MCNC: 40 MG/DL (ref 7–22)
CALCIUM SERPL-MCNC: 9.2 MG/DL (ref 8.5–10.5)
CHLORIDE SERPL-SCNC: 104 MEQ/L (ref 98–111)
CO2 SERPL-SCNC: 29 MEQ/L (ref 23–33)
CREAT SERPL-MCNC: 0.6 MG/DL (ref 0.4–1.2)
DEPRECATED RDW RBC AUTO: 57.6 FL (ref 35–45)
ERYTHROCYTE [DISTWIDTH] IN BLOOD BY AUTOMATED COUNT: 16 % (ref 11.5–14.5)
GFR SERPL CREATININE-BSD FRML MDRD: > 60 ML/MIN/1.73M2
GLUCOSE BLD STRIP.AUTO-MCNC: 162 MG/DL (ref 70–108)
GLUCOSE BLD STRIP.AUTO-MCNC: 171 MG/DL (ref 70–108)
GLUCOSE BLD STRIP.AUTO-MCNC: 185 MG/DL (ref 70–108)
GLUCOSE BLD STRIP.AUTO-MCNC: 224 MG/DL (ref 70–108)
GLUCOSE BLD STRIP.AUTO-MCNC: 237 MG/DL (ref 70–108)
GLUCOSE SERPL-MCNC: 181 MG/DL (ref 70–108)
HCT VFR BLD AUTO: 22 % (ref 37–47)
HCT VFR BLD AUTO: 26.1 % (ref 37–47)
HGB BLD-MCNC: 6.8 GM/DL (ref 12–16)
HGB BLD-MCNC: 8.2 GM/DL (ref 12–16)
IAT IGG-SP REAG SERPL QL: NORMAL
MCH RBC QN AUTO: 30.1 PG (ref 26–33)
MCHC RBC AUTO-ENTMCNC: 30.9 GM/DL (ref 32.2–35.5)
MCV RBC AUTO: 97.3 FL (ref 81–99)
PLATELET # BLD AUTO: 212 THOU/MM3 (ref 130–400)
PMV BLD AUTO: 10.1 FL (ref 9.4–12.4)
POTASSIUM SERPL-SCNC: 4.3 MEQ/L (ref 3.5–5.2)
RBC # BLD AUTO: 2.26 MILL/MM3 (ref 4.2–5.4)
RH FACTOR: NORMAL
SODIUM SERPL-SCNC: 141 MEQ/L (ref 135–145)
WBC # BLD AUTO: 6.3 THOU/MM3 (ref 4.8–10.8)

## 2023-11-14 PROCEDURE — 6370000000 HC RX 637 (ALT 250 FOR IP): Performed by: EMERGENCY MEDICINE

## 2023-11-14 PROCEDURE — 6370000000 HC RX 637 (ALT 250 FOR IP): Performed by: INTERNAL MEDICINE

## 2023-11-14 PROCEDURE — P9016 RBC LEUKOCYTES REDUCED: HCPCS

## 2023-11-14 PROCEDURE — 6360000002 HC RX W HCPCS: Performed by: INTERNAL MEDICINE

## 2023-11-14 PROCEDURE — 86901 BLOOD TYPING SEROLOGIC RH(D): CPT

## 2023-11-14 PROCEDURE — 6370000000 HC RX 637 (ALT 250 FOR IP): Performed by: NURSE PRACTITIONER

## 2023-11-14 PROCEDURE — 36415 COLL VENOUS BLD VENIPUNCTURE: CPT

## 2023-11-14 PROCEDURE — 99024 POSTOP FOLLOW-UP VISIT: CPT | Performed by: REGISTERED NURSE

## 2023-11-14 PROCEDURE — 99291 CRITICAL CARE FIRST HOUR: CPT | Performed by: INTERNAL MEDICINE

## 2023-11-14 PROCEDURE — 2500000003 HC RX 250 WO HCPCS: Performed by: EMERGENCY MEDICINE

## 2023-11-14 PROCEDURE — 82948 REAGENT STRIP/BLOOD GLUCOSE: CPT

## 2023-11-14 PROCEDURE — 2500000003 HC RX 250 WO HCPCS

## 2023-11-14 PROCEDURE — 6360000002 HC RX W HCPCS: Performed by: NURSE PRACTITIONER

## 2023-11-14 PROCEDURE — 85018 HEMOGLOBIN: CPT

## 2023-11-14 PROCEDURE — 2580000003 HC RX 258: Performed by: INTERNAL MEDICINE

## 2023-11-14 PROCEDURE — 86885 COOMBS TEST INDIRECT QUAL: CPT

## 2023-11-14 PROCEDURE — 36430 TRANSFUSION BLD/BLD COMPNT: CPT

## 2023-11-14 PROCEDURE — 94003 VENT MGMT INPAT SUBQ DAY: CPT

## 2023-11-14 PROCEDURE — 2500000003 HC RX 250 WO HCPCS: Performed by: NURSE PRACTITIONER

## 2023-11-14 PROCEDURE — 86923 COMPATIBILITY TEST ELECTRIC: CPT

## 2023-11-14 PROCEDURE — 2700000000 HC OXYGEN THERAPY PER DAY

## 2023-11-14 PROCEDURE — 85027 COMPLETE CBC AUTOMATED: CPT

## 2023-11-14 PROCEDURE — 80048 BASIC METABOLIC PNL TOTAL CA: CPT

## 2023-11-14 PROCEDURE — 2580000003 HC RX 258: Performed by: EMERGENCY MEDICINE

## 2023-11-14 PROCEDURE — 86900 BLOOD TYPING SEROLOGIC ABO: CPT

## 2023-11-14 PROCEDURE — 2000000000 HC ICU R&B

## 2023-11-14 PROCEDURE — 85014 HEMATOCRIT: CPT

## 2023-11-14 PROCEDURE — 2580000003 HC RX 258: Performed by: NURSE PRACTITIONER

## 2023-11-14 PROCEDURE — 94761 N-INVAS EAR/PLS OXIMETRY MLT: CPT

## 2023-11-14 RX ORDER — LORAZEPAM 2 MG/ML
INJECTION INTRAMUSCULAR
Status: DISPENSED
Start: 2023-11-14 | End: 2023-11-15

## 2023-11-14 RX ORDER — SODIUM CHLORIDE 9 MG/ML
INJECTION, SOLUTION INTRAVENOUS PRN
Status: DISCONTINUED | OUTPATIENT
Start: 2023-11-14 | End: 2023-11-20

## 2023-11-14 RX ORDER — LORAZEPAM 2 MG/ML
4 INJECTION INTRAMUSCULAR ONCE
Status: COMPLETED | OUTPATIENT
Start: 2023-11-14 | End: 2023-11-14

## 2023-11-14 RX ORDER — INSULIN GLARGINE 100 [IU]/ML
30 INJECTION, SOLUTION SUBCUTANEOUS NIGHTLY
Status: DISPENSED | OUTPATIENT
Start: 2023-11-14

## 2023-11-14 RX ORDER — METOLAZONE 5 MG/1
5 TABLET ORAL DAILY
Status: DISCONTINUED | OUTPATIENT
Start: 2023-11-14 | End: 2023-11-17

## 2023-11-14 RX ORDER — CISATRACURIUM BESYLATE 2 MG/ML
5 INJECTION, SOLUTION INTRAVENOUS ONCE
Status: COMPLETED | OUTPATIENT
Start: 2023-11-14 | End: 2023-11-14

## 2023-11-14 RX ADMIN — LEVETIRACETAM 500 MG: 100 INJECTION, SOLUTION INTRAVENOUS at 06:11

## 2023-11-14 RX ADMIN — FAMOTIDINE 20 MG: 20 TABLET ORAL at 21:12

## 2023-11-14 RX ADMIN — OXYMETAZOLINE HCL 3 SPRAY: 0.05 SPRAY NASAL at 14:47

## 2023-11-14 RX ADMIN — SODIUM CHLORIDE, PRESERVATIVE FREE 10 ML: 5 INJECTION INTRAVENOUS at 21:09

## 2023-11-14 RX ADMIN — BUMETANIDE 1 MG: 0.25 INJECTION INTRAMUSCULAR; INTRAVENOUS at 21:13

## 2023-11-14 RX ADMIN — BUMETANIDE 1 MG: 0.25 INJECTION INTRAMUSCULAR; INTRAVENOUS at 08:34

## 2023-11-14 RX ADMIN — SALINE NASAL SPRAY 2 SPRAY: 1.5 SOLUTION NASAL at 21:08

## 2023-11-14 RX ADMIN — LORAZEPAM 4 MG: 2 INJECTION INTRAMUSCULAR; INTRAVENOUS at 20:33

## 2023-11-14 RX ADMIN — MICONAZOLE NITRATE: 2 POWDER TOPICAL at 08:34

## 2023-11-14 RX ADMIN — ENOXAPARIN SODIUM 90 MG: 100 INJECTION SUBCUTANEOUS at 21:12

## 2023-11-14 RX ADMIN — ENOXAPARIN SODIUM 90 MG: 100 INJECTION SUBCUTANEOUS at 09:12

## 2023-11-14 RX ADMIN — LEVETIRACETAM 500 MG: 100 INJECTION, SOLUTION INTRAVENOUS at 17:24

## 2023-11-14 RX ADMIN — MODAFINIL 200 MG: 100 TABLET ORAL at 08:34

## 2023-11-14 RX ADMIN — CISATRACURIUM BESYLATE 5 MG: 2 INJECTION, SOLUTION INTRAVENOUS at 20:41

## 2023-11-14 RX ADMIN — SODIUM CHLORIDE, PRESERVATIVE FREE 10 ML: 5 INJECTION INTRAVENOUS at 08:34

## 2023-11-14 RX ADMIN — OXYMETAZOLINE HCL 3 SPRAY: 0.05 SPRAY NASAL at 08:35

## 2023-11-14 RX ADMIN — FOLIC ACID 1 MG: 5 INJECTION, SOLUTION INTRAMUSCULAR; INTRAVENOUS; SUBCUTANEOUS at 08:39

## 2023-11-14 RX ADMIN — INSULIN GLARGINE 30 UNITS: 100 INJECTION, SOLUTION SUBCUTANEOUS at 23:00

## 2023-11-14 RX ADMIN — METOLAZONE 5 MG: 5 TABLET ORAL at 08:34

## 2023-11-14 RX ADMIN — SALINE NASAL SPRAY 2 SPRAY: 1.5 SOLUTION NASAL at 08:34

## 2023-11-14 RX ADMIN — CHLORHEXIDINE GLUCONATE 0.12% ORAL RINSE 15 ML: 1.2 LIQUID ORAL at 08:34

## 2023-11-14 RX ADMIN — FAMOTIDINE 20 MG: 20 TABLET ORAL at 08:34

## 2023-11-14 RX ADMIN — CHLORHEXIDINE GLUCONATE 0.12% ORAL RINSE 15 ML: 1.2 LIQUID ORAL at 20:51

## 2023-11-14 RX ADMIN — MICONAZOLE NITRATE: 2 POWDER TOPICAL at 21:08

## 2023-11-14 RX ADMIN — INSULIN LISPRO 4 UNITS: 100 INJECTION, SOLUTION INTRAVENOUS; SUBCUTANEOUS at 17:34

## 2023-11-14 RX ADMIN — OXYMETAZOLINE HCL 3 SPRAY: 0.05 SPRAY NASAL at 21:09

## 2023-11-14 ASSESSMENT — PULMONARY FUNCTION TESTS
PIF_VALUE: 20
PIF_VALUE: 13
PIF_VALUE: 14
PIF_VALUE: 13

## 2023-11-14 NOTE — PROGRESS NOTES
Pt was unable to assess but was blessed.     11/14/23 1415   Encounter Summary   Service Provided For: Patient   Referral/Consult From: [facility]   Last Encounter  11/14/23  (NR)   Complexity of Encounter Low   Begin Time 1000   End Time  1006   Total Time Calculated 6 min   Spiritual/Emotional needs   Type Spiritual Support   Assessment/Intervention/Outcome   Assessment Unable to assess

## 2023-11-14 NOTE — PLAN OF CARE
Problem: Discharge Planning  Goal: Discharge to home or other facility with appropriate resources  11/14/2023 1850 by Willi Tolliver RN  Outcome: Progressing  Flowsheets (Taken 11/13/2023 2000 by Nazia Regan RN)  Discharge to home or other facility with appropriate resources: Identify barriers to discharge with patient and caregiver  11/14/2023 0802 by Nazia Regan RN  Outcome: Progressing  Flowsheets (Taken 11/13/2023 2000)  Discharge to home or other facility with appropriate resources: Identify barriers to discharge with patient and caregiver     Problem: Chronic Conditions and Co-morbidities  Goal: Patient's chronic conditions and co-morbidity symptoms are monitored and maintained or improved  11/14/2023 0802 by Nazia Regan RN  Outcome: Progressing     Problem: Safety - Adult  Goal: Free from fall injury  11/14/2023 1850 by Willi Tolliver RN  Outcome: Progressing  [address] (Taken 11/9/2023 0743 by Franco Rahman RN)  Free From Fall Injury: Instruct family/caregiver on patient safety  11/14/2023 0802 by Nazia Regan RN  Outcome: Progressing  Note: Falling star program remains in place. Call light and personal belongings within reach. Frequent visual monitoring continues. Toileting program inplace. Patient assisted in turning/repositioning at least once every 2 hours, and on a prn basis.        Problem: Pain  Goal: Verbalizes/displays adequate comfort level or baseline comfort level  11/14/2023 1850 by Willi Tolliver RN  Outcome: Progressing  Flowsheets (Taken 11/12/2023 1920 by Janet Landa RN)  Verbalizes/displays adequate comfort level or baseline comfort level:   Encourage patient to monitor pain and request assistance   Assess pain using appropriate pain scale   Administer analgesics based on type and severity of pain and evaluate response   Implement non-pharmacological measures as appropriate and evaluate response   Consider cultural Cardiovascular - Adult  Goal: Maintains optimal cardiac output and hemodynamic stability  11/14/2023 1850 by Dioni Ely RN  Outcome: Progressing  Flowsheets (Taken 11/12/2023 1920 by Mi Gomes RN)  Maintains optimal cardiac output and hemodynamic stability:   Monitor blood pressure and heart rate   Monitor urine output and notify Licensed Independent Practitioner for values outside of normal range   Assess for signs of decreased cardiac output   Administer fluid and/or volume expanders as ordered  11/14/2023 0802 by Romeo Maza RN  Outcome: Progressing     Problem: ABCDS Injury Assessment  Goal: Absence of physical injury  11/14/2023 0802 by Romeo Maza RN  Outcome: Progressing    Care plan reviewed with patient and . Patient and  verbalize understanding of the plan of care and contribute to goal setting.

## 2023-11-14 NOTE — PROGRESS NOTES
Patient noted to cough and spits out bright red blood. Patient suctioned, patient does not allow the yonker to pass teeth. This nurse attempts to reposition tongue gently with yonker, patient's jaw is set and this nurse not able to get tongue out of way. RT called. Patient's  at bedside.

## 2023-11-14 NOTE — PLAN OF CARE
Problem: Discharge Planning  Goal: Discharge to home or other facility with appropriate resources  Outcome: Progressing  Flowsheets (Taken 11/13/2023 2000)  Discharge to home or other facility with appropriate resources: Identify barriers to discharge with patient and caregiver     Problem: Chronic Conditions and Co-morbidities  Goal: Patient's chronic conditions and co-morbidity symptoms are monitored and maintained or improved  Outcome: Progressing     Problem: Safety - Adult  Goal: Free from fall injury  Outcome: Progressing  Note: Falling star program remains in place. Call light and personal belongings within reach. Frequent visual monitoring continues. Toileting program inplace. Patient assisted in turning/repositioning at least once every 2 hours, and on a prn basis. Problem: Pain  Goal: Verbalizes/displays adequate comfort level or baseline comfort level  Outcome: Progressing  Note: Continuing to monitor pain and discomfort. Monitoring pain level on scale of 0-10. Non- pharmacological measures encouraged to reduce discomfort/pain. PRN pain meds administeration continues when/if applicable as ordered by physician.         Problem: Respiratory - Adult  Goal: Achieves optimal ventilation and oxygenation  Outcome: Progressing     Problem: Neurosensory - Adult  Goal: Achieves stable or improved neurological status  Outcome: Progressing  Goal: Absence of seizures  Outcome: Progressing     Problem: Skin/Tissue Integrity - Adult  Goal: Incisions, wounds, or drain sites healing without S/S of infection  Outcome: Progressing     Problem: Gastrointestinal - Adult  Goal: Maintains adequate nutritional intake  Outcome: Progressing     Problem: Genitourinary - Adult  Goal: Urinary catheter remains patent  Outcome: Progressing     Problem: Cardiovascular - Adult  Goal: Maintains optimal cardiac output and hemodynamic stability  Outcome: Progressing     Problem: Nutrition Deficit:  Goal: Optimize nutritional

## 2023-11-15 ENCOUNTER — ANESTHESIA EVENT (OUTPATIENT)
Dept: OPERATING ROOM | Age: 73
End: 2023-11-15
Payer: MEDICARE

## 2023-11-15 LAB
BASOPHILS ABSOLUTE: 0 THOU/MM3 (ref 0–0.1)
BASOPHILS NFR BLD AUTO: 0.5 %
DEPRECATED RDW RBC AUTO: 59.6 FL (ref 35–45)
EOSINOPHIL NFR BLD AUTO: 2.9 %
EOSINOPHILS ABSOLUTE: 0.2 THOU/MM3 (ref 0–0.4)
ERYTHROCYTE [DISTWIDTH] IN BLOOD BY AUTOMATED COUNT: 17 % (ref 11.5–14.5)
GLUCOSE BLD STRIP.AUTO-MCNC: 117 MG/DL (ref 70–108)
GLUCOSE BLD STRIP.AUTO-MCNC: 205 MG/DL (ref 70–108)
GLUCOSE BLD STRIP.AUTO-MCNC: 208 MG/DL (ref 70–108)
GLUCOSE BLD STRIP.AUTO-MCNC: 213 MG/DL (ref 70–108)
HCT VFR BLD AUTO: 25.6 % (ref 37–47)
HGB BLD-MCNC: 8.2 GM/DL (ref 12–16)
IMM GRANULOCYTES # BLD AUTO: 0.06 THOU/MM3 (ref 0–0.07)
IMM GRANULOCYTES NFR BLD AUTO: 0.8 %
LYMPHOCYTES ABSOLUTE: 1.9 THOU/MM3 (ref 1–4.8)
LYMPHOCYTES NFR BLD AUTO: 24.5 %
MCH RBC QN AUTO: 30.5 PG (ref 26–33)
MCHC RBC AUTO-ENTMCNC: 32 GM/DL (ref 32.2–35.5)
MCV RBC AUTO: 95.2 FL (ref 81–99)
MONOCYTES ABSOLUTE: 0.7 THOU/MM3 (ref 0.4–1.3)
MONOCYTES NFR BLD AUTO: 8.7 %
NEUTROPHILS NFR BLD AUTO: 62.6 %
NRBC BLD AUTO-RTO: 0 /100 WBC
PLATELET # BLD AUTO: 239 THOU/MM3 (ref 130–400)
PMV BLD AUTO: 10.1 FL (ref 9.4–12.4)
RBC # BLD AUTO: 2.69 MILL/MM3 (ref 4.2–5.4)
SEGMENTED NEUTROPHILS ABSOLUTE COUNT: 4.8 THOU/MM3 (ref 1.8–7.7)
WBC # BLD AUTO: 7.6 THOU/MM3 (ref 4.8–10.8)

## 2023-11-15 PROCEDURE — 99291 CRITICAL CARE FIRST HOUR: CPT | Performed by: INTERNAL MEDICINE

## 2023-11-15 PROCEDURE — 6370000000 HC RX 637 (ALT 250 FOR IP): Performed by: NURSE PRACTITIONER

## 2023-11-15 PROCEDURE — 85025 COMPLETE CBC W/AUTO DIFF WBC: CPT

## 2023-11-15 PROCEDURE — P9047 ALBUMIN (HUMAN), 25%, 50ML: HCPCS | Performed by: NURSE PRACTITIONER

## 2023-11-15 PROCEDURE — 2500000003 HC RX 250 WO HCPCS: Performed by: NURSE PRACTITIONER

## 2023-11-15 PROCEDURE — 2500000003 HC RX 250 WO HCPCS

## 2023-11-15 PROCEDURE — 82948 REAGENT STRIP/BLOOD GLUCOSE: CPT

## 2023-11-15 PROCEDURE — 6370000000 HC RX 637 (ALT 250 FOR IP): Performed by: EMERGENCY MEDICINE

## 2023-11-15 PROCEDURE — 99024 POSTOP FOLLOW-UP VISIT: CPT | Performed by: REGISTERED NURSE

## 2023-11-15 PROCEDURE — 2000000000 HC ICU R&B

## 2023-11-15 PROCEDURE — 6360000002 HC RX W HCPCS: Performed by: NURSE PRACTITIONER

## 2023-11-15 PROCEDURE — 2500000003 HC RX 250 WO HCPCS: Performed by: EMERGENCY MEDICINE

## 2023-11-15 PROCEDURE — 94761 N-INVAS EAR/PLS OXIMETRY MLT: CPT

## 2023-11-15 PROCEDURE — 6370000000 HC RX 637 (ALT 250 FOR IP): Performed by: INTERNAL MEDICINE

## 2023-11-15 PROCEDURE — 94003 VENT MGMT INPAT SUBQ DAY: CPT

## 2023-11-15 PROCEDURE — 2580000003 HC RX 258: Performed by: NURSE PRACTITIONER

## 2023-11-15 PROCEDURE — 6360000002 HC RX W HCPCS: Performed by: INTERNAL MEDICINE

## 2023-11-15 PROCEDURE — 2700000000 HC OXYGEN THERAPY PER DAY

## 2023-11-15 PROCEDURE — 2580000003 HC RX 258: Performed by: INTERNAL MEDICINE

## 2023-11-15 PROCEDURE — 2580000003 HC RX 258: Performed by: EMERGENCY MEDICINE

## 2023-11-15 RX ORDER — NOREPINEPHRINE BITARTRATE 0.06 MG/ML
INJECTION, SOLUTION INTRAVENOUS
Status: DISPENSED
Start: 2023-11-15 | End: 2023-11-15

## 2023-11-15 RX ORDER — NOREPINEPHRINE BITARTRATE 0.06 MG/ML
1-100 INJECTION, SOLUTION INTRAVENOUS CONTINUOUS
Status: DISCONTINUED | OUTPATIENT
Start: 2023-11-15 | End: 2023-11-17

## 2023-11-15 RX ORDER — DEXAMETHASONE SODIUM PHOSPHATE 4 MG/ML
10 INJECTION, SOLUTION INTRA-ARTICULAR; INTRALESIONAL; INTRAMUSCULAR; INTRAVENOUS; SOFT TISSUE ONCE
Status: COMPLETED | OUTPATIENT
Start: 2023-11-16 | End: 2023-11-16

## 2023-11-15 RX ORDER — ALBUMIN (HUMAN) 12.5 G/50ML
50 SOLUTION INTRAVENOUS ONCE
Status: COMPLETED | OUTPATIENT
Start: 2023-11-15 | End: 2023-11-15

## 2023-11-15 RX ADMIN — MICONAZOLE NITRATE: 2 POWDER TOPICAL at 20:19

## 2023-11-15 RX ADMIN — SODIUM CHLORIDE, PRESERVATIVE FREE 10 ML: 5 INJECTION INTRAVENOUS at 08:45

## 2023-11-15 RX ADMIN — FAMOTIDINE 20 MG: 20 TABLET ORAL at 20:19

## 2023-11-15 RX ADMIN — INSULIN LISPRO 4 UNITS: 100 INJECTION, SOLUTION INTRAVENOUS; SUBCUTANEOUS at 17:44

## 2023-11-15 RX ADMIN — OXYMETAZOLINE HCL 3 SPRAY: 0.05 SPRAY NASAL at 08:46

## 2023-11-15 RX ADMIN — BUMETANIDE 1 MG: 0.25 INJECTION INTRAMUSCULAR; INTRAVENOUS at 20:19

## 2023-11-15 RX ADMIN — INSULIN LISPRO 4 UNITS: 100 INJECTION, SOLUTION INTRAVENOUS; SUBCUTANEOUS at 12:03

## 2023-11-15 RX ADMIN — ALBUMIN (HUMAN) 50 G: 0.25 INJECTION, SOLUTION INTRAVENOUS at 01:56

## 2023-11-15 RX ADMIN — MODAFINIL 200 MG: 100 TABLET ORAL at 08:58

## 2023-11-15 RX ADMIN — MICONAZOLE NITRATE: 2 POWDER TOPICAL at 08:43

## 2023-11-15 RX ADMIN — SALINE NASAL SPRAY 2 SPRAY: 1.5 SOLUTION NASAL at 08:45

## 2023-11-15 RX ADMIN — LEVETIRACETAM 500 MG: 100 INJECTION, SOLUTION INTRAVENOUS at 05:46

## 2023-11-15 RX ADMIN — INSULIN LISPRO 4 UNITS: 100 INJECTION, SOLUTION INTRAVENOUS; SUBCUTANEOUS at 01:13

## 2023-11-15 RX ADMIN — BUMETANIDE 1 MG: 0.25 INJECTION INTRAMUSCULAR; INTRAVENOUS at 08:58

## 2023-11-15 RX ADMIN — Medication 5 MCG/MIN: at 01:06

## 2023-11-15 RX ADMIN — SODIUM CHLORIDE, PRESERVATIVE FREE 10 ML: 5 INJECTION INTRAVENOUS at 18:43

## 2023-11-15 RX ADMIN — SODIUM CHLORIDE, PRESERVATIVE FREE 10 ML: 5 INJECTION INTRAVENOUS at 18:21

## 2023-11-15 RX ADMIN — CHLORHEXIDINE GLUCONATE 0.12% ORAL RINSE 15 ML: 1.2 LIQUID ORAL at 20:19

## 2023-11-15 RX ADMIN — SALINE NASAL SPRAY 2 SPRAY: 1.5 SOLUTION NASAL at 20:24

## 2023-11-15 RX ADMIN — SODIUM CHLORIDE, PRESERVATIVE FREE 10 ML: 5 INJECTION INTRAVENOUS at 20:24

## 2023-11-15 RX ADMIN — ENOXAPARIN SODIUM 90 MG: 100 INJECTION SUBCUTANEOUS at 08:56

## 2023-11-15 RX ADMIN — FOLIC ACID 1 MG: 5 INJECTION, SOLUTION INTRAMUSCULAR; INTRAVENOUS; SUBCUTANEOUS at 08:51

## 2023-11-15 RX ADMIN — METOLAZONE 5 MG: 5 TABLET ORAL at 08:59

## 2023-11-15 RX ADMIN — LEVETIRACETAM 500 MG: 100 INJECTION, SOLUTION INTRAVENOUS at 18:21

## 2023-11-15 RX ADMIN — OXYMETAZOLINE HCL 3 SPRAY: 0.05 SPRAY NASAL at 14:32

## 2023-11-15 RX ADMIN — FAMOTIDINE 20 MG: 20 TABLET ORAL at 08:58

## 2023-11-15 RX ADMIN — SODIUM CHLORIDE, PRESERVATIVE FREE 10 ML: 5 INJECTION INTRAVENOUS at 09:00

## 2023-11-15 RX ADMIN — OXYMETAZOLINE HCL 3 SPRAY: 0.05 SPRAY NASAL at 20:24

## 2023-11-15 RX ADMIN — CHLORHEXIDINE GLUCONATE 0.12% ORAL RINSE 15 ML: 1.2 LIQUID ORAL at 08:44

## 2023-11-15 RX ADMIN — INSULIN GLARGINE 30 UNITS: 100 INJECTION, SOLUTION SUBCUTANEOUS at 20:33

## 2023-11-15 ASSESSMENT — PAIN SCALES - GENERAL
PAINLEVEL_OUTOF10: 0

## 2023-11-15 ASSESSMENT — PAIN SCALES - WONG BAKER
WONGBAKER_NUMERICALRESPONSE: 0
WONGBAKER_NUMERICALRESPONSE: 0

## 2023-11-15 ASSESSMENT — PULMONARY FUNCTION TESTS
PIF_VALUE: 17
PIF_VALUE: 14
PIF_VALUE: 18
PIF_VALUE: 18
PIF_VALUE: 17

## 2023-11-15 NOTE — PLAN OF CARE
Problem: Respiratory - Adult  Goal: Normal spontaneous ventilation  11/15/2023 0556 by Juliana Vizcarra RCP  Outcome: Progressing     Problem: Respiratory - Adult  Goal: Achieves optimal ventilation and oxygenation  11/15/2023 0556 by Juliana Vizcarra RCP  Outcome: Progressing                                                  Patient Weaning Progress    The patient's vent settings was able to be weaned this shift. Ventilator settings that were weaned              [x] Mode   [] Pressure support weaned   [] Fio2 weaned   [] Peep weaned      Spontaneous weaning trial  was attempted. The patient was able to tolerate SBT. RSBI  was 66 with  SpO2 of 98 on 25% FiO2. Spontanteous VT was 271 and RR 18 breaths/min. The patient remains on sbt     Evac tube was  hooked up with continuous low suction(20-30mmHg)      Cuff was not  deflated to determine cuff leak. Unable to get agreement for goals because no family is present and patient cannot respond.

## 2023-11-15 NOTE — PROGRESS NOTES
1930- After report from dayshimiguel RN, both her and I were at bedside to assess patient. Noted large amounts of bloody secretions pooling and draining from the side of the patients mouth. Tried opening patients mouth to suction secretions but the patient had her teeth clamped down on her tongue and onto the bite block. This RN called for the resource nurse, Milena Mcbride, to come help with the patient. We were unable to open patients teeth to suction and free the tongue. Took concerns to ANDRA Elmore on the unit and [place] RT for the evening. No new orders from Catarina at this point. After further evaluation and attempts to open the patients mouth, the resource nurse addressed ANDRA Elmore, again about further treatment as the patient was continuing with large bloody secretions from her mouth. Catarina instructed this RN to call Marcelino Franco for further orders regarding tongue. Further discussion prompted Catarina to call Dr. Katelin Mccracken for further treatment and orders. After Nimbex and Ativan given per orders ( See MAR), respiratory was able to free the patients tongue and place 3 gauze in the mouth to protect the tongue from further biting. Bleeding from the wound on the bottom of the tongue stopped. Picture of tongue wound uploaded to [facility] labeled for tongue wound. Alert/Awake

## 2023-11-15 NOTE — PROGRESS NOTES
As read in nurse note on 11/14, blood coming from patients mouth. I was asked by nurse and NP to take a look and to see what I can do. I made a couple suggestions to the np, pt was sedated and paralyzed to do the lower jaw clinching on tongue. Np attempted to put oral airway in under tongue and it seemed to cause patient to gag?? Oral airway taken out and 2 1x1 gauze were place on molars on each side and 1 4x4 was place on bottom of teeth until icu provider comes in on 11/15. Bleeding is minimal at this time. Picture to be uploaded into chart.

## 2023-11-15 NOTE — SIGNIFICANT EVENT
[address] [place] [address]  Department of Critical Care Medicine   ICU Night Call Note        Call initiated by: Nursing staff:  Jeff Persaud and Isabella Miranda RN  Call addressed around: 11/14/2023 7:20 PM      Reason for call: swollen tongue with moderate serosanguinous-bloody secretion     Orders placed: Ativan 4 mg once and Nimbex 5 mg, once for oral intervention procedure at bedside. Patient was seen and examined. Noted bloody secretion around the mouth with  small blood clot at left cheek upon suctioning. Nurses: Mark Moran are at bedside as well as RT. Noticed patient's tongue are swollen, bite block in placed in between upper teeth and tongue. However, noticed a pressing pressure between lower teeth and tongue that could be the cause of small cut at left lateral side of the tongue that is bleeding. Events was reported to attending physician on call, Dr. Ruben Rosa who recommended to give ativan 4 mg and nimbex to relax the lower jaw. Suction mouth, hold pressure gauze to the small cut at left lateral tongue, and stopped bleeding. Then, 3 pieces of folded gauze was put by me and RT in between the lower teeth and tongue (2 at the side and 1 in the front). Bleeding stopped and no bloody secretion noted after the intervention.  Will continue to monitor    DAGOBERTO Carroll   Critical Care Medicine  11/14/2023 9:12 PM

## 2023-11-16 ENCOUNTER — ANESTHESIA (OUTPATIENT)
Dept: OPERATING ROOM | Age: 73
End: 2023-11-16
Payer: MEDICARE

## 2023-11-16 LAB
ANION GAP SERPL CALC-SCNC: 12 MEQ/L (ref 8–16)
BASOPHILS ABSOLUTE: 0 THOU/MM3 (ref 0–0.1)
BASOPHILS NFR BLD AUTO: 0.4 %
BUN SERPL-MCNC: 34 MG/DL (ref 7–22)
CA-I BLD ISE-SCNC: 1.21 MMOL/L (ref 1.12–1.32)
CALCIUM SERPL-MCNC: 9.9 MG/DL (ref 8.5–10.5)
CHLORIDE SERPL-SCNC: 98 MEQ/L (ref 98–111)
CO2 SERPL-SCNC: 30 MEQ/L (ref 23–33)
CREAT SERPL-MCNC: 0.6 MG/DL (ref 0.4–1.2)
DEPRECATED RDW RBC AUTO: 57.6 FL (ref 35–45)
EOSINOPHIL NFR BLD AUTO: 2.4 %
EOSINOPHILS ABSOLUTE: 0.2 THOU/MM3 (ref 0–0.4)
ERYTHROCYTE [DISTWIDTH] IN BLOOD BY AUTOMATED COUNT: 16.6 % (ref 11.5–14.5)
GFR SERPL CREATININE-BSD FRML MDRD: > 60 ML/MIN/1.73M2
GLUCOSE BLD STRIP.AUTO-MCNC: 121 MG/DL (ref 70–108)
GLUCOSE BLD STRIP.AUTO-MCNC: 125 MG/DL (ref 70–108)
GLUCOSE BLD STRIP.AUTO-MCNC: 156 MG/DL (ref 70–108)
GLUCOSE BLD STRIP.AUTO-MCNC: 189 MG/DL (ref 70–108)
GLUCOSE SERPL-MCNC: 108 MG/DL (ref 70–108)
HCT VFR BLD AUTO: 28 % (ref 37–47)
HGB BLD-MCNC: 9.1 GM/DL (ref 12–16)
IMM GRANULOCYTES # BLD AUTO: 0.04 THOU/MM3 (ref 0–0.07)
IMM GRANULOCYTES NFR BLD AUTO: 0.6 %
LYMPHOCYTES ABSOLUTE: 1.5 THOU/MM3 (ref 1–4.8)
LYMPHOCYTES NFR BLD AUTO: 21.9 %
MAGNESIUM SERPL-MCNC: 1.8 MG/DL (ref 1.6–2.4)
MCH RBC QN AUTO: 30.8 PG (ref 26–33)
MCHC RBC AUTO-ENTMCNC: 32.5 GM/DL (ref 32.2–35.5)
MCV RBC AUTO: 94.9 FL (ref 81–99)
MONOCYTES ABSOLUTE: 0.5 THOU/MM3 (ref 0.4–1.3)
MONOCYTES NFR BLD AUTO: 7.8 %
NEUTROPHILS NFR BLD AUTO: 66.9 %
NRBC BLD AUTO-RTO: 0 /100 WBC
PLATELET # BLD AUTO: 230 THOU/MM3 (ref 130–400)
PMV BLD AUTO: 9.7 FL (ref 9.4–12.4)
POTASSIUM SERPL-SCNC: 4 MEQ/L (ref 3.5–5.2)
RBC # BLD AUTO: 2.95 MILL/MM3 (ref 4.2–5.4)
SEGMENTED NEUTROPHILS ABSOLUTE COUNT: 4.7 THOU/MM3 (ref 1.8–7.7)
SODIUM SERPL-SCNC: 140 MEQ/L (ref 135–145)
WBC # BLD AUTO: 7 THOU/MM3 (ref 4.8–10.8)

## 2023-11-16 PROCEDURE — 36592 COLLECT BLOOD FROM PICC: CPT

## 2023-11-16 PROCEDURE — 95711 VEEG 2-12 HR UNMONITORED: CPT

## 2023-11-16 PROCEDURE — 2580000003 HC RX 258: Performed by: NURSE PRACTITIONER

## 2023-11-16 PROCEDURE — 82948 REAGENT STRIP/BLOOD GLUCOSE: CPT

## 2023-11-16 PROCEDURE — 6360000002 HC RX W HCPCS: Performed by: REGISTERED NURSE

## 2023-11-16 PROCEDURE — 94003 VENT MGMT INPAT SUBQ DAY: CPT

## 2023-11-16 PROCEDURE — 31541 LARYNSCOP W/TUMR EXC + SCOPE: CPT | Performed by: NURSE PRACTITIONER

## 2023-11-16 PROCEDURE — 2580000003 HC RX 258: Performed by: REGISTERED NURSE

## 2023-11-16 PROCEDURE — APPNB45 APP NON BILLABLE 31-45 MINUTES: Performed by: NURSE PRACTITIONER

## 2023-11-16 PROCEDURE — 94761 N-INVAS EAR/PLS OXIMETRY MLT: CPT

## 2023-11-16 PROCEDURE — 2700000000 HC OXYGEN THERAPY PER DAY

## 2023-11-16 PROCEDURE — 31571 LARYNGOSCOP W/VC INJ + SCOPE: CPT | Performed by: NURSE PRACTITIONER

## 2023-11-16 PROCEDURE — 3E0F83Z INTRODUCTION OF ANTI-INFLAMMATORY INTO RESPIRATORY TRACT, VIA NATURAL OR ARTIFICIAL OPENING ENDOSCOPIC: ICD-10-PCS | Performed by: OTOLARYNGOLOGY

## 2023-11-16 PROCEDURE — 2500000003 HC RX 250 WO HCPCS

## 2023-11-16 PROCEDURE — 36415 COLL VENOUS BLD VENIPUNCTURE: CPT

## 2023-11-16 PROCEDURE — 80048 BASIC METABOLIC PNL TOTAL CA: CPT

## 2023-11-16 PROCEDURE — 6360000002 HC RX W HCPCS: Performed by: NURSE PRACTITIONER

## 2023-11-16 PROCEDURE — 88305 TISSUE EXAM BY PATHOLOGIST: CPT

## 2023-11-16 PROCEDURE — 6370000000 HC RX 637 (ALT 250 FOR IP): Performed by: NURSE PRACTITIONER

## 2023-11-16 PROCEDURE — 6370000000 HC RX 637 (ALT 250 FOR IP): Performed by: EMERGENCY MEDICINE

## 2023-11-16 PROCEDURE — 82330 ASSAY OF CALCIUM: CPT

## 2023-11-16 PROCEDURE — 0CBS8ZZ EXCISION OF LARYNX, VIA NATURAL OR ARTIFICIAL OPENING ENDOSCOPIC: ICD-10-PCS | Performed by: OTOLARYNGOLOGY

## 2023-11-16 PROCEDURE — 95718 EEG PHYS/QHP 2-12 HR W/VEEG: CPT | Performed by: PSYCHIATRY & NEUROLOGY

## 2023-11-16 PROCEDURE — 3600000004 HC SURGERY LEVEL 4 BASE: Performed by: OTOLARYNGOLOGY

## 2023-11-16 PROCEDURE — 85025 COMPLETE CBC W/AUTO DIFF WBC: CPT

## 2023-11-16 PROCEDURE — 83735 ASSAY OF MAGNESIUM: CPT

## 2023-11-16 PROCEDURE — 99291 CRITICAL CARE FIRST HOUR: CPT | Performed by: INTERNAL MEDICINE

## 2023-11-16 PROCEDURE — 3600000014 HC SURGERY LEVEL 4 ADDTL 15MIN: Performed by: OTOLARYNGOLOGY

## 2023-11-16 PROCEDURE — 31622 DX BRONCHOSCOPE/WASH: CPT | Performed by: NURSE PRACTITIONER

## 2023-11-16 PROCEDURE — 2500000003 HC RX 250 WO HCPCS: Performed by: EMERGENCY MEDICINE

## 2023-11-16 PROCEDURE — 6360000002 HC RX W HCPCS: Performed by: INTERNAL MEDICINE

## 2023-11-16 PROCEDURE — 2720000010 HC SURG SUPPLY STERILE: Performed by: OTOLARYNGOLOGY

## 2023-11-16 PROCEDURE — 2500000003 HC RX 250 WO HCPCS: Performed by: NURSE PRACTITIONER

## 2023-11-16 PROCEDURE — 0BJ08ZZ INSPECTION OF TRACHEOBRONCHIAL TREE, VIA NATURAL OR ARTIFICIAL OPENING ENDOSCOPIC: ICD-10-PCS | Performed by: OTOLARYNGOLOGY

## 2023-11-16 PROCEDURE — 0CJS8ZZ INSPECTION OF LARYNX, VIA NATURAL OR ARTIFICIAL OPENING ENDOSCOPIC: ICD-10-PCS | Performed by: OTOLARYNGOLOGY

## 2023-11-16 PROCEDURE — 2500000003 HC RX 250 WO HCPCS: Performed by: REGISTERED NURSE

## 2023-11-16 PROCEDURE — 2709999900 HC NON-CHARGEABLE SUPPLY: Performed by: OTOLARYNGOLOGY

## 2023-11-16 PROCEDURE — 6370000000 HC RX 637 (ALT 250 FOR IP): Performed by: INTERNAL MEDICINE

## 2023-11-16 PROCEDURE — 2000000000 HC ICU R&B

## 2023-11-16 PROCEDURE — 2580000003 HC RX 258: Performed by: EMERGENCY MEDICINE

## 2023-11-16 PROCEDURE — 2580000003 HC RX 258: Performed by: INTERNAL MEDICINE

## 2023-11-16 PROCEDURE — 3700000001 HC ADD 15 MINUTES (ANESTHESIA): Performed by: OTOLARYNGOLOGY

## 2023-11-16 PROCEDURE — 3700000000 HC ANESTHESIA ATTENDED CARE: Performed by: OTOLARYNGOLOGY

## 2023-11-16 RX ORDER — PROPOFOL 10 MG/ML
INJECTION, EMULSION INTRAVENOUS CONTINUOUS PRN
Status: DISCONTINUED | OUTPATIENT
Start: 2023-11-16 | End: 2023-11-16 | Stop reason: SDUPTHER

## 2023-11-16 RX ORDER — ROCURONIUM BROMIDE 10 MG/ML
INJECTION, SOLUTION INTRAVENOUS PRN
Status: DISCONTINUED | OUTPATIENT
Start: 2023-11-16 | End: 2023-11-16 | Stop reason: SDUPTHER

## 2023-11-16 RX ORDER — FENTANYL CITRATE 50 UG/ML
INJECTION, SOLUTION INTRAMUSCULAR; INTRAVENOUS PRN
Status: DISCONTINUED | OUTPATIENT
Start: 2023-11-16 | End: 2023-11-16 | Stop reason: SDUPTHER

## 2023-11-16 RX ORDER — SODIUM CHLORIDE 9 MG/ML
INJECTION, SOLUTION INTRAVENOUS CONTINUOUS PRN
Status: DISCONTINUED | OUTPATIENT
Start: 2023-11-16 | End: 2023-11-16 | Stop reason: SDUPTHER

## 2023-11-16 RX ADMIN — METOLAZONE 5 MG: 5 TABLET ORAL at 09:59

## 2023-11-16 RX ADMIN — SODIUM CHLORIDE, PRESERVATIVE FREE 10 ML: 5 INJECTION INTRAVENOUS at 07:06

## 2023-11-16 RX ADMIN — SODIUM CHLORIDE 3000 MG: 900 INJECTION INTRAVENOUS at 19:12

## 2023-11-16 RX ADMIN — SODIUM CHLORIDE, PRESERVATIVE FREE 10 ML: 5 INJECTION INTRAVENOUS at 10:00

## 2023-11-16 RX ADMIN — FOLIC ACID 1 MG: 5 INJECTION, SOLUTION INTRAMUSCULAR; INTRAVENOUS; SUBCUTANEOUS at 10:09

## 2023-11-16 RX ADMIN — ROCURONIUM BROMIDE 20 MG: 10 INJECTION INTRAVENOUS at 17:45

## 2023-11-16 RX ADMIN — FENTANYL CITRATE 100 MCG: 50 INJECTION, SOLUTION INTRAMUSCULAR; INTRAVENOUS at 17:51

## 2023-11-16 RX ADMIN — ROCURONIUM BROMIDE 50 MG: 10 INJECTION INTRAVENOUS at 17:33

## 2023-11-16 RX ADMIN — ROCURONIUM BROMIDE 30 MG: 10 INJECTION INTRAVENOUS at 17:41

## 2023-11-16 RX ADMIN — BUMETANIDE 1 MG: 0.25 INJECTION INTRAMUSCULAR; INTRAVENOUS at 20:18

## 2023-11-16 RX ADMIN — SUGAMMADEX 200 MG: 100 INJECTION, SOLUTION INTRAVENOUS at 18:36

## 2023-11-16 RX ADMIN — SODIUM CHLORIDE, PRESERVATIVE FREE 10 ML: 5 INJECTION INTRAVENOUS at 10:02

## 2023-11-16 RX ADMIN — FAMOTIDINE 20 MG: 20 TABLET ORAL at 20:12

## 2023-11-16 RX ADMIN — SODIUM CHLORIDE, PRESERVATIVE FREE 10 ML: 5 INJECTION INTRAVENOUS at 20:09

## 2023-11-16 RX ADMIN — FAMOTIDINE 20 MG: 20 TABLET ORAL at 09:57

## 2023-11-16 RX ADMIN — SALINE NASAL SPRAY 2 SPRAY: 1.5 SOLUTION NASAL at 20:18

## 2023-11-16 RX ADMIN — MODAFINIL 200 MG: 100 TABLET ORAL at 09:57

## 2023-11-16 RX ADMIN — SODIUM CHLORIDE, PRESERVATIVE FREE 10 ML: 5 INJECTION INTRAVENOUS at 12:48

## 2023-11-16 RX ADMIN — DEXAMETHASONE SODIUM PHOSPHATE 10 MG: 4 INJECTION, SOLUTION INTRA-ARTICULAR; INTRALESIONAL; INTRAMUSCULAR; INTRAVENOUS; SOFT TISSUE at 12:39

## 2023-11-16 RX ADMIN — CHLORHEXIDINE GLUCONATE 0.12% ORAL RINSE 15 ML: 1.2 LIQUID ORAL at 09:57

## 2023-11-16 RX ADMIN — MICONAZOLE NITRATE: 2 POWDER TOPICAL at 20:17

## 2023-11-16 RX ADMIN — MICONAZOLE NITRATE: 2 POWDER TOPICAL at 10:00

## 2023-11-16 RX ADMIN — PROPOFOL 150 MCG/KG/MIN: 10 INJECTION, EMULSION INTRAVENOUS at 17:30

## 2023-11-16 RX ADMIN — SODIUM CHLORIDE, PRESERVATIVE FREE 10 ML: 5 INJECTION INTRAVENOUS at 10:01

## 2023-11-16 RX ADMIN — LEVETIRACETAM 500 MG: 100 INJECTION, SOLUTION INTRAVENOUS at 19:07

## 2023-11-16 RX ADMIN — ROCURONIUM BROMIDE 20 MG: 10 INJECTION INTRAVENOUS at 17:51

## 2023-11-16 RX ADMIN — SODIUM CHLORIDE: 9 INJECTION, SOLUTION INTRAVENOUS at 17:27

## 2023-11-16 RX ADMIN — SODIUM CHLORIDE 3000 MG: 900 INJECTION INTRAVENOUS at 06:00

## 2023-11-16 RX ADMIN — SODIUM CHLORIDE 3000 MG: 900 INJECTION INTRAVENOUS at 12:50

## 2023-11-16 RX ADMIN — CHLORHEXIDINE GLUCONATE 0.12% ORAL RINSE 15 ML: 1.2 LIQUID ORAL at 20:10

## 2023-11-16 RX ADMIN — SALINE NASAL SPRAY 2 SPRAY: 1.5 SOLUTION NASAL at 10:11

## 2023-11-16 RX ADMIN — BUMETANIDE 1 MG: 0.25 INJECTION INTRAMUSCULAR; INTRAVENOUS at 10:04

## 2023-11-16 RX ADMIN — INSULIN GLARGINE 30 UNITS: 100 INJECTION, SOLUTION SUBCUTANEOUS at 20:32

## 2023-11-16 RX ADMIN — SODIUM CHLORIDE 3000 MG: 900 INJECTION INTRAVENOUS at 00:32

## 2023-11-16 RX ADMIN — LEVETIRACETAM 500 MG: 100 INJECTION, SOLUTION INTRAVENOUS at 06:42

## 2023-11-16 ASSESSMENT — PULMONARY FUNCTION TESTS
PIF_VALUE: 16
PIF_VALUE: 16
PIF_VALUE: 15
PIF_VALUE: 21
PIF_VALUE: 15

## 2023-11-16 NOTE — PLAN OF CARE
Problem: Discharge Planning  Goal: Discharge to home or other facility with appropriate resources  11/16/2023 0228 by Ab Cochran RN  Outcome: Progressing  Flowsheets (Taken 11/16/2023 0228)  Discharge to home or other facility with appropriate resources:   Identify barriers to discharge with patient and caregiver   Arrange for needed discharge resources and transportation as appropriate   Identify discharge learning needs (meds, wound care, etc)   Refer to discharge planning if patient needs post-hospital services based on physician order or complex needs related to functional status, cognitive ability or social support system  11/15/2023 1844 by John Gregory RN  Outcome: Progressing  Flowsheets (Taken 11/15/2023 1844)  Discharge to home or other facility with appropriate resources:   Identify barriers to discharge with patient and caregiver   Identify discharge learning needs (meds, wound care, etc)   Refer to discharge planning if patient needs post-hospital services based on physician order or complex needs related to functional status, cognitive ability or social support system   Arrange for needed discharge resources and transportation as appropriate     Problem: Chronic Conditions and Co-morbidities  Goal: Patient's chronic conditions and co-morbidity symptoms are monitored and maintained or improved  11/16/2023 0228 by Ab Cochran RN  Outcome: Progressing  Flowsheets (Taken 11/16/2023 0228)  Care Plan - Patient's Chronic Conditions and Co-Morbidity Symptoms are Monitored and Maintained or Improved:   Monitor and assess patient's chronic conditions and comorbid symptoms for stability, deterioration, or improvement   Collaborate with multidisciplinary team to address chronic and comorbid conditions and prevent exacerbation or deterioration   Update acute care plan with appropriate goals if chronic or comorbid symptoms are exacerbated and prevent overall improvement and discharge  11/15/2023 DAILY: Assess and document dressing/incision, wound bed, drain sites and surrounding tissue     Problem: Gastrointestinal - Adult  Goal: Maintains adequate nutritional intake  11/16/2023 0228 by Delia Whitfield RN  Outcome: Progressing  Flowsheets (Taken 11/16/2023 0228)  Maintains adequate nutritional intake: Monitor intake and output, weight and lab values  11/15/2023 1844 by Reid Goss RN  Outcome: Progressing  Flowsheets (Taken 11/15/2023 1844)  Maintains adequate nutritional intake:   Monitor percentage of each meal consumed   Monitor intake and output, weight and lab values   Identify factors contributing to decreased intake, treat as appropriate   Obtain nutritional consult as needed     Problem: Genitourinary - Adult  Goal: Urinary catheter remains patent  11/16/2023 0228 by Delia Whitfield RN  Outcome: Progressing  Flowsheets (Taken 11/16/2023 0228)  Urinary catheter remains patent:   Assess patency of urinary catheter   Irrigate catheter per Licensed Independent Practitioner order if indicated and notify Licensed Independent Practitioner if unable to irrigate  11/15/2023 1844 by Reid Goss RN  Outcome: Progressing  Flowsheets (Taken 11/15/2023 1844)  Urinary catheter remains patent: Assess patency of urinary catheter     Problem: Coping  Goal: Pt/Family able to verbalize concerns and demonstrate effective coping strategies  Description: INTERVENTIONS:  1. Assist patient/family to identify coping skills, available support systems and cultural and spiritual values  2. Provide emotional support, including active listening and acknowledgement of concerns of patient and caregivers  3. Reduce environmental stimuli, as able  4. Instruct patient/family in relaxation techniques, as appropriate  5.  Assess for spiritual pain/suffering and initiate Spiritual Care, Psychosocial Clinical Specialist consults as needed  11/16/2023 0228 by Delia Whitfield RN  Outcome: Progressing  Flowsheets (Taken

## 2023-11-16 NOTE — PROCEDURES
EEG REPORT       Patient: Amalia Ortiz Age: 81 y.o. MRN: [id number]      Referring Provider: No ref. provider found    History: This routine 40 minute scalp EEG was recorded with video- monitoring for a 81 y.o. Lisandro Alba female who presented with Cardiac arrest. This EEG was performed to evaluate for focal and epileptiform abnormalities.      Zully Sierra   Current Facility-Administered Medications   Medication Dose Route Frequency Provider Last Rate Last Admin    ampicillin-sulbactam (UNASYN) 3,000 mg in sodium chloride 0.9 % 100 mL IVPB (mini-bag)  3,000 mg IntraVENous On Call to OR Raquel Soto MD        norepinephrine (LEVOPHED) 16 mg in sodium chloride 0.9 % 250 mL infusion  1-100 mcg/min IntraVENous Continuous Denae Victor APRN - CNP   Stopped at 11/15/23 1600    ampicillin-sulbactam (UNASYN) 3,000 mg in sodium chloride 0.9 % 100 mL IVPB (mini-bag)  3,000 mg IntraVENous Q6H Zhanna Muniz APRN - CNP   Stopped at 11/16/23 0640    dexamethasone (DECADRON) injection 10 mg  10 mg IntraVENous Once Zhanna Muniz APRN - CNP        0.9 % sodium chloride infusion   IntraVENous PRN Selene Schwartz MD        insulin glargine (LANTUS) injection vial 30 Units  30 Units SubCUTAneous Nightly Selene Schwartz MD   30 Units at 11/15/23 2033    metOLazone (ZAROXOLYN) tablet 5 mg  5 mg Oral Daily Selene Schwartz MD   5 mg at 11/15/23 0859    levETIRAcetam (KEPPRA) 500 mg in sodium chloride 0.9 % 100 mL IVPB  500 mg IntraVENous Q12H Selene Schwartz MD   Stopped at 11/16/23 8179    sodium chloride (OCEAN, BABY AYR) 0.65 % nasal spray 2 spray  2 spray Each Nostril BID Nikkie Santacruz APRN - CNP   2 spray at 36/75/74 6807    folic acid 1 mg in sodium chloride 0.9 % 50 mL IVPB  1 mg IntraVENous Daily Licha Geiger MD   Stopped at 11/15/23 0925    bumetanide (BUMEX) injection 1 mg  1 mg IntraVENous BID Curtis Hutson DO   1 mg at 11/15/23 2019    [Held by provider] enoxaparin (LOVENOX) injection 90 mg  1 mg/kg

## 2023-11-16 NOTE — PLAN OF CARE
Problem: Respiratory - Adult  Goal: Normal spontaneous ventilation  11/16/2023 1021 by Adrienne GARCIA RCP  Outcome: Progressing   Vent setting optimized to achieve target tidal volume, respiratory rate and ideal oxygen saturations. SBT will be performed when appropriate. Patient Weaning Progress    The patient's vent settings was not able to be weaned this shift. Ventilator settings that were weaned              [] Mode   [] Pressure support weaned   [] Fio2 weaned   [] Peep weaned      Pt remains on spontaneous weaning trial.       *Results of SBT documented under SBTOUTCOME note. Reason that defined ventilator parameters for SBT was not met              [] Patient condition requires increased ventilator settings  [] Requires increased sedation   [] Settings not within weaning range   [] SAT not completed   [] Physician orders    The patient was able to tolerate SBT. Spontanteous VT was 400 and RR 18 breaths/min. Unable to get agreement for goals because no family is present and patient cannot respond.

## 2023-11-16 NOTE — PLAN OF CARE
Problem: Respiratory - Adult  Goal: Normal spontaneous ventilation  Outcome: Progressing                                                  Patient Weaning Progress    The patient's vent settings was able to be weaned this shift. Ventilator settings that were weaned              [] Mode   [x] Pressure support weaned   [] Fio2 weaned   [] Peep weaned      Spontaneous weaning trial  was attempted. The patient was able to tolerate SBT. Unable to get agreement for goals because no family is present and patient cannot respond.

## 2023-11-16 NOTE — PLAN OF CARE
Problem: Discharge Planning  Goal: Discharge to home or other facility with appropriate resources  Outcome: Progressing  Flowsheets (Taken 11/16/2023 0228 by Tiarra Nixon RN)  Discharge to home or other facility with appropriate resources:   Identify barriers to discharge with patient and caregiver   Arrange for needed discharge resources and transportation as appropriate   Identify discharge learning needs (meds, wound care, etc)   Refer to discharge planning if patient needs post-hospital services based on physician order or complex needs related to functional status, cognitive ability or social support system     Problem: Chronic Conditions and Co-morbidities  Goal: Patient's chronic conditions and co-morbidity symptoms are monitored and maintained or improved  Outcome: Progressing  Flowsheets (Taken 11/16/2023 0228 by Tiarra Nixon RN)  Care Plan - Patient's Chronic Conditions and Co-Morbidity Symptoms are Monitored and Maintained or Improved:   Monitor and assess patient's chronic conditions and comorbid symptoms for stability, deterioration, or improvement   Collaborate with multidisciplinary team to address chronic and comorbid conditions and prevent exacerbation or deterioration   Update acute care plan with appropriate goals if chronic or comorbid symptoms are exacerbated and prevent overall improvement and discharge     Problem: Safety - Adult  Goal: Free from fall injury  Outcome: Progressing  Flowsheets (Taken 11/16/2023 0228 by Tiarra Nixon RN)  Free From Fall Injury:   Instruct family/caregiver on patient safety   Based on caregiver fall risk screen, instruct family/caregiver to ask for assistance with transferring infant if caregiver noted to have fall risk factors     Problem: Pain  Goal: Verbalizes/displays adequate comfort level or baseline comfort level  Outcome: Progressing  Flowsheets (Taken 11/16/2023 0228 by Tiarra Nixon RN)  Verbalizes/displays adequate comfort level or Assessment  Goal: Absence of physical injury  Outcome: Progressing  Flowsheets (Taken 11/16/2023 0228 by Brad Grant, RN)  Absence of Physical Injury: Implement safety measures based on patient assessment     Problem: Musculoskeletal - Adult  Goal: Maintain proper alignment of affected body part  Outcome: Progressing  Flowsheets (Taken 11/16/2023 0228 by Brad Grant, RN)  Maintain proper alignment of affected body part:   Support and protect limb and body alignment per provider's orders   Instruct and reinforce with patient and family use of appropriate assistive device and precautions (e.g. spinal or hip dislocation precautions)   Care plan reviewed with .  verbalize understanding of the plan of care and contribute to goal setting. Pt unable to participate.

## 2023-11-16 NOTE — PROGRESS NOTES
Pt was unresponsive but was blessed.     11/16/23 0941   Encounter Summary   Service Provided For: Patient   Referral/Consult From: [facility]   Last Encounter  11/16/23  (NR)   Complexity of Encounter Low   Begin Time 0814   End Time  0820   Total Time Calculated 6 min   Spiritual/Emotional needs   Type Spiritual Support   Assessment/Intervention/Outcome   Assessment Unable to assess

## 2023-11-16 NOTE — PROGRESS NOTES
[facility] Laboratory Technician Worksheet      EEG Date: 2023    Name: Stephanie Aponte   : 1950   Age: 81 y.o. SEX: female    ROOM: 12 MRN: [id number]           CSN: [id number]                           4HR eeg    Ordering Provider: Princess Mcelroy  EEG Number: [id number] Time of Test:  3277    Hand: UNKNOWN   Sedation: no    H.V. Done: No  AGE PROTOCOL  Photic: Yes    Sleep: Yes  Drowsy: No   Sleep Deprived: No    Seizures observed: no, chews during suctioning noted on eeg    Mentality: no command follow, pulls back with pain,       Clinical History:  post cardiac arrest in OCT after airway inflammation leading to respiratory arrest,  numerous EEGs done,   Mri   IMPRESSION:     1. No evidence of an acute infarct. No evidence of anoxia. 2. Stable mild severity chronic small vessel ischemic changes. 3. Global volume loss.      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    Hyperlipidemia     Lower back pain     With diagnosis of having \"stress fracture\" by ortho at [facility]    Primary hypertension     Pulmonary embolism ([address]) 2023    Right lower lobe    Type 2 diabetes mellitus ([address])        Scheduled Meds:   ampicillin-sulbactam  3,000 mg IntraVENous On Call to OR    ampicillin-sulbactam  3,000 mg IntraVENous Q6H    dexamethasone  10 mg IntraVENous Once    insulin glargine  30 Units SubCUTAneous Nightly    metOLazone  5 mg Oral Daily    levETIRAcetam  500 mg IntraVENous Q12H    sodium chloride  2 spray Each Nostril BID    folic acid 1 mg in sodium chloride 0.9 % 50 mL IVPB  1 mg IntraVENous Daily    bumetanide  1 mg IntraVENous BID    [Held by provider] enoxaparin  1 mg/kg SubCUTAneous Q12H    miconazole   Topical BID    insulin lispro  0-16 Units

## 2023-11-16 NOTE — CARE COORDINATION
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 There is also nonocclusive thrombus within the left posterior tibial vein  10/16 Extubated to bipap  10/16 Code Blue  10/16 Reintubated  10/16 Bronch: thick bloody secretions in RLL removed  10/16 CXR: Mild stable cardiomegaly with pulmonary vascular congestion suspicious for congestive heart failure; Prominent pulmonary interstitium suspicious for pulmonary edema  10/17 5 hr EEG: No seizures noted; diffuse background attenuation and suppression without evidence of reactivity; severe encephalopathy  10/17 CT Head: Stable CT appearance the brain. No acute findings  10/17 MRI Brain: No acute findings  51/66 PICC left basilic  69/06 CT Head: No acute findings  10/18 OR: ET Exchange bronhoscopy and lavage  10/19 4hr 40 min EEG: This EEG was abnormal. The background abnormalities indicated a moderate to severe encephalopathy, nonspecific to etiology. The generalized periodic discharges (GPDs) indicated underlying cortical irritability/increased risk of seizures, and can be seen in various encephalopathies. No seizures were recorded  10/19 MRI Brain: No evidence of an acute infarct. No evidence of anoxia; Stable mild severity chronic small vessel ischemic changes; Global volume loss  10/23 4-day EEG: Moderate to severe encephalopathy, improved as recording progressed.  Frequent and at times near continuous runs of GPDs with often typical and at times atypical triphasic

## 2023-11-16 NOTE — PLAN OF CARE
Multiple views of the left coronary artery obtained using power injection. Rate = 4 mL/sec. Total volume = 10 mL. Problem: Chronic Conditions and Co-morbidities  Goal: Patient's chronic conditions and co-morbidity symptoms are monitored and maintained or improved  10/21/2023 2133 by Jami Escobar RN  Outcome: Progressing  Flowsheets (Taken 10/21/2023 2133)  Care Plan - Patient's Chronic Conditions and Co-Morbidity Symptoms are Monitored and Maintained or Improved:   Monitor and assess patient's chronic conditions and comorbid symptoms for stability, deterioration, or improvement   Collaborate with multidisciplinary team to address chronic and comorbid conditions and prevent exacerbation or deterioration   Update acute care plan with appropriate goals if chronic or comorbid symptoms are exacerbated and prevent overall improvement and discharge  10/21/2023 1002 by Kerwin Kasper RN  Outcome: Progressing  Flowsheets (Taken 10/21/2023 1002)  Care Plan - Patient's Chronic Conditions and Co-Morbidity Symptoms are Monitored and Maintained or Improved:   Monitor and assess patient's chronic conditions and comorbid symptoms for stability, deterioration, or improvement   Collaborate with multidisciplinary team to address chronic and comorbid conditions and prevent exacerbation or deterioration   Update acute care plan with appropriate goals if chronic or comorbid symptoms are exacerbated and prevent overall improvement and discharge     Problem: Safety - Adult  Goal: Free from fall injury  10/21/2023 2133 by Jami Escobar RN  Outcome: Progressing  Flowsheets (Taken 10/21/2023 2133)  Free From Fall Injury:   Instruct family/caregiver on patient safety   Based on caregiver fall risk screen, instruct family/caregiver to ask for assistance with transferring infant if caregiver noted to have fall risk factors  10/21/2023 1002 by Kerwin Kasper RN  Outcome: Progressing  Flowsheets (Taken 10/21/2023 1002)  Free From Fall Injury: Instruct family/caregiver on patient safety     Problem: Pain  Goal: Verbalizes/displays adequate status until nausea and vomiting are resolved   Administer ordered antiemetic medications as needed  Goal: Maintains adequate nutritional intake  10/21/2023 2133 by Tiarra Nixon RN  Outcome: Progressing  Flowsheets (Taken 10/21/2023 2133)  Maintains adequate nutritional intake:   Monitor intake and output, weight and lab values   Obtain nutritional consult as needed  10/21/2023 1002 by Michelle Strauss RN  Outcome: Progressing  Flowsheets (Taken 10/21/2023 1002)  Maintains adequate nutritional intake:   Monitor percentage of each meal consumed   Monitor intake and output, weight and lab values   Identify factors contributing to decreased intake, treat as appropriate     Problem: Genitourinary - Adult  Goal: Urinary catheter remains patent  10/21/2023 2133 by Tiarra Nixon RN  Outcome: Progressing  Flowsheets (Taken 10/21/2023 2133)  Urinary catheter remains patent: Assess patency of urinary catheter  10/21/2023 1002 by Michelle Strauss RN  Outcome: Progressing  Flowsheets (Taken 10/21/2023 1002)  Urinary catheter remains patent: Assess patency of urinary catheter     Problem: Coping  Goal: Pt/Family able to verbalize concerns and demonstrate effective coping strategies  Description: INTERVENTIONS:  1. Assist patient/family to identify coping skills, available support systems and cultural and spiritual values  2. Provide emotional support, including active listening and acknowledgement of concerns of patient and caregivers  3. Reduce environmental stimuli, as able  4. Instruct patient/family in relaxation techniques, as appropriate  5.  Assess for spiritual pain/suffering and initiate Spiritual Care, Psychosocial Clinical Specialist consults as needed  10/21/2023 2133 by Tiarra Nixon RN  Outcome: Progressing  Flowsheets (Taken 10/21/2023 2133)  Patient/family able to verbalize anxieties, fears, and concerns, and demonstrate effective coping:   Provide emotional support, including active listening and

## 2023-11-17 LAB
ABO GROUP BLD: NORMAL
ANION GAP SERPL CALC-SCNC: 11 MEQ/L (ref 8–16)
BASOPHILS ABSOLUTE: 0 THOU/MM3 (ref 0–0.1)
BASOPHILS NFR BLD AUTO: 0.2 %
BUN SERPL-MCNC: 41 MG/DL (ref 7–22)
CA-I BLD ISE-SCNC: 1.18 MMOL/L (ref 1.12–1.32)
CALCIUM SERPL-MCNC: 9.6 MG/DL (ref 8.5–10.5)
CHLORIDE SERPL-SCNC: 96 MEQ/L (ref 98–111)
CO2 SERPL-SCNC: 30 MEQ/L (ref 23–33)
CORTIS SERPL-MCNC: 8.51 UG/DL
CORTISOL COLLECTION INFO: NORMAL
CREAT SERPL-MCNC: 0.7 MG/DL (ref 0.4–1.2)
DEPRECATED RDW RBC AUTO: 55.3 FL (ref 35–45)
EOSINOPHIL NFR BLD AUTO: 1.2 %
EOSINOPHILS ABSOLUTE: 0.1 THOU/MM3 (ref 0–0.4)
ERYTHROCYTE [DISTWIDTH] IN BLOOD BY AUTOMATED COUNT: 15.9 % (ref 11.5–14.5)
GFR SERPL CREATININE-BSD FRML MDRD: > 60 ML/MIN/1.73M2
GLUCOSE BLD STRIP.AUTO-MCNC: 200 MG/DL (ref 70–108)
GLUCOSE BLD STRIP.AUTO-MCNC: 209 MG/DL (ref 70–108)
GLUCOSE SERPL-MCNC: 139 MG/DL (ref 70–108)
HCT VFR BLD AUTO: 24.5 % (ref 37–47)
HCT VFR BLD AUTO: 27.6 % (ref 37–47)
HCT VFR BLD AUTO: 30.1 % (ref 37–47)
HGB BLD-MCNC: 7.8 GM/DL (ref 12–16)
HGB BLD-MCNC: 8.8 GM/DL (ref 12–16)
HGB BLD-MCNC: 9.7 GM/DL (ref 12–16)
IAT IGG-SP REAG SERPL QL: NORMAL
IMM GRANULOCYTES # BLD AUTO: 0.06 THOU/MM3 (ref 0–0.07)
IMM GRANULOCYTES NFR BLD AUTO: 0.7 %
LYMPHOCYTES ABSOLUTE: 1.7 THOU/MM3 (ref 1–4.8)
LYMPHOCYTES NFR BLD AUTO: 21.2 %
MAGNESIUM SERPL-MCNC: 1.9 MG/DL (ref 1.6–2.4)
MCH RBC QN AUTO: 30.1 PG (ref 26–33)
MCHC RBC AUTO-ENTMCNC: 31.9 GM/DL (ref 32.2–35.5)
MCV RBC AUTO: 94.5 FL (ref 81–99)
MONOCYTES ABSOLUTE: 0.8 THOU/MM3 (ref 0.4–1.3)
MONOCYTES NFR BLD AUTO: 9.7 %
NEUTROPHILS NFR BLD AUTO: 67 %
NRBC BLD AUTO-RTO: 0 /100 WBC
PLATELET # BLD AUTO: 269 THOU/MM3 (ref 130–400)
PMV BLD AUTO: 10 FL (ref 9.4–12.4)
POTASSIUM SERPL-SCNC: 4.1 MEQ/L (ref 3.5–5.2)
RBC # BLD AUTO: 2.92 MILL/MM3 (ref 4.2–5.4)
RH BLD: NORMAL
SEGMENTED NEUTROPHILS ABSOLUTE COUNT: 5.4 THOU/MM3 (ref 1.8–7.7)
SODIUM SERPL-SCNC: 137 MEQ/L (ref 135–145)
WBC # BLD AUTO: 8 THOU/MM3 (ref 4.8–10.8)

## 2023-11-17 PROCEDURE — 2500000003 HC RX 250 WO HCPCS: Performed by: NURSE PRACTITIONER

## 2023-11-17 PROCEDURE — 85014 HEMATOCRIT: CPT

## 2023-11-17 PROCEDURE — 6370000000 HC RX 637 (ALT 250 FOR IP): Performed by: INTERNAL MEDICINE

## 2023-11-17 PROCEDURE — 83735 ASSAY OF MAGNESIUM: CPT

## 2023-11-17 PROCEDURE — 6360000002 HC RX W HCPCS: Performed by: NURSE PRACTITIONER

## 2023-11-17 PROCEDURE — 89220 SPUTUM SPECIMEN COLLECTION: CPT

## 2023-11-17 PROCEDURE — 2580000003 HC RX 258: Performed by: NURSE PRACTITIONER

## 2023-11-17 PROCEDURE — 85025 COMPLETE CBC W/AUTO DIFF WBC: CPT

## 2023-11-17 PROCEDURE — 36415 COLL VENOUS BLD VENIPUNCTURE: CPT

## 2023-11-17 PROCEDURE — 99024 POSTOP FOLLOW-UP VISIT: CPT | Performed by: PHYSICIAN ASSISTANT

## 2023-11-17 PROCEDURE — 86923 COMPATIBILITY TEST ELECTRIC: CPT

## 2023-11-17 PROCEDURE — 2700000000 HC OXYGEN THERAPY PER DAY

## 2023-11-17 PROCEDURE — 86885 COOMBS TEST INDIRECT QUAL: CPT

## 2023-11-17 PROCEDURE — 86901 BLOOD TYPING SEROLOGIC RH(D): CPT

## 2023-11-17 PROCEDURE — P9016 RBC LEUKOCYTES REDUCED: HCPCS

## 2023-11-17 PROCEDURE — 86900 BLOOD TYPING SEROLOGIC ABO: CPT

## 2023-11-17 PROCEDURE — 94003 VENT MGMT INPAT SUBQ DAY: CPT

## 2023-11-17 PROCEDURE — 6370000000 HC RX 637 (ALT 250 FOR IP): Performed by: NURSE PRACTITIONER

## 2023-11-17 PROCEDURE — 99291 CRITICAL CARE FIRST HOUR: CPT | Performed by: INTERNAL MEDICINE

## 2023-11-17 PROCEDURE — 82948 REAGENT STRIP/BLOOD GLUCOSE: CPT

## 2023-11-17 PROCEDURE — 2500000003 HC RX 250 WO HCPCS

## 2023-11-17 PROCEDURE — 80048 BASIC METABOLIC PNL TOTAL CA: CPT

## 2023-11-17 PROCEDURE — 2580000003 HC RX 258: Performed by: INTERNAL MEDICINE

## 2023-11-17 PROCEDURE — 2000000000 HC ICU R&B

## 2023-11-17 PROCEDURE — 94761 N-INVAS EAR/PLS OXIMETRY MLT: CPT

## 2023-11-17 PROCEDURE — 82533 TOTAL CORTISOL: CPT

## 2023-11-17 PROCEDURE — 36430 TRANSFUSION BLD/BLD COMPNT: CPT

## 2023-11-17 PROCEDURE — 85018 HEMOGLOBIN: CPT

## 2023-11-17 PROCEDURE — 82330 ASSAY OF CALCIUM: CPT

## 2023-11-17 RX ORDER — SODIUM CHLORIDE 9 MG/ML
INJECTION, SOLUTION INTRAVENOUS PRN
Status: DISCONTINUED | OUTPATIENT
Start: 2023-11-17 | End: 2023-11-20

## 2023-11-17 RX ORDER — 0.9 % SODIUM CHLORIDE 0.9 %
500 INTRAVENOUS SOLUTION INTRAVENOUS ONCE
Status: COMPLETED | OUTPATIENT
Start: 2023-11-17 | End: 2023-11-17

## 2023-11-17 RX ORDER — BUMETANIDE 1 MG/1
1 TABLET ORAL 2 TIMES DAILY
Status: DISCONTINUED | OUTPATIENT
Start: 2023-11-17 | End: 2023-11-22

## 2023-11-17 RX ORDER — NOREPINEPHRINE BITARTRATE 0.06 MG/ML
1-100 INJECTION, SOLUTION INTRAVENOUS CONTINUOUS
Status: DISCONTINUED | OUTPATIENT
Start: 2023-11-17 | End: 2023-11-22

## 2023-11-17 RX ORDER — NOREPINEPHRINE BITARTRATE 0.06 MG/ML
INJECTION, SOLUTION INTRAVENOUS
Status: COMPLETED
Start: 2023-11-17 | End: 2023-11-17

## 2023-11-17 RX ADMIN — SODIUM CHLORIDE, PRESERVATIVE FREE 10 ML: 5 INJECTION INTRAVENOUS at 20:29

## 2023-11-17 RX ADMIN — SODIUM CHLORIDE 3000 MG: 900 INJECTION INTRAVENOUS at 18:11

## 2023-11-17 RX ADMIN — FOLIC ACID 1 MG: 5 INJECTION, SOLUTION INTRAMUSCULAR; INTRAVENOUS; SUBCUTANEOUS at 09:04

## 2023-11-17 RX ADMIN — SODIUM CHLORIDE, PRESERVATIVE FREE 10 ML: 5 INJECTION INTRAVENOUS at 08:48

## 2023-11-17 RX ADMIN — SALINE NASAL SPRAY 2 SPRAY: 1.5 SOLUTION NASAL at 20:30

## 2023-11-17 RX ADMIN — MICONAZOLE NITRATE: 2 POWDER TOPICAL at 20:26

## 2023-11-17 RX ADMIN — FAMOTIDINE 20 MG: 20 TABLET ORAL at 20:26

## 2023-11-17 RX ADMIN — INSULIN GLARGINE 30 UNITS: 100 INJECTION, SOLUTION SUBCUTANEOUS at 20:26

## 2023-11-17 RX ADMIN — MICONAZOLE NITRATE: 2 POWDER TOPICAL at 08:52

## 2023-11-17 RX ADMIN — MODAFINIL 200 MG: 100 TABLET ORAL at 08:44

## 2023-11-17 RX ADMIN — SODIUM CHLORIDE, PRESERVATIVE FREE 10 ML: 5 INJECTION INTRAVENOUS at 08:46

## 2023-11-17 RX ADMIN — SODIUM CHLORIDE 3000 MG: 900 INJECTION INTRAVENOUS at 00:36

## 2023-11-17 RX ADMIN — SODIUM CHLORIDE 3000 MG: 900 INJECTION INTRAVENOUS at 11:43

## 2023-11-17 RX ADMIN — SODIUM CHLORIDE 3000 MG: 900 INJECTION INTRAVENOUS at 05:34

## 2023-11-17 RX ADMIN — SALINE NASAL SPRAY 2 SPRAY: 1.5 SOLUTION NASAL at 08:35

## 2023-11-17 RX ADMIN — LEVETIRACETAM 500 MG: 100 INJECTION, SOLUTION INTRAVENOUS at 05:33

## 2023-11-17 RX ADMIN — CHLORHEXIDINE GLUCONATE 0.12% ORAL RINSE 15 ML: 1.2 LIQUID ORAL at 20:27

## 2023-11-17 RX ADMIN — SODIUM CHLORIDE, PRESERVATIVE FREE 10 ML: 5 INJECTION INTRAVENOUS at 09:00

## 2023-11-17 RX ADMIN — CHLORHEXIDINE GLUCONATE 0.12% ORAL RINSE 15 ML: 1.2 LIQUID ORAL at 08:35

## 2023-11-17 RX ADMIN — FAMOTIDINE 20 MG: 20 TABLET ORAL at 08:44

## 2023-11-17 RX ADMIN — INSULIN LISPRO 4 UNITS: 100 INJECTION, SOLUTION INTRAVENOUS; SUBCUTANEOUS at 11:38

## 2023-11-17 RX ADMIN — SODIUM CHLORIDE 500 ML: 9 INJECTION, SOLUTION INTRAVENOUS at 10:23

## 2023-11-17 RX ADMIN — INSULIN LISPRO 4 UNITS: 100 INJECTION, SOLUTION INTRAVENOUS; SUBCUTANEOUS at 18:22

## 2023-11-17 RX ADMIN — LEVETIRACETAM 500 MG: 100 INJECTION, SOLUTION INTRAVENOUS at 17:51

## 2023-11-17 RX ADMIN — BUMETANIDE 1 MG: 1 TABLET ORAL at 08:49

## 2023-11-17 RX ADMIN — BUMETANIDE 1 MG: 1 TABLET ORAL at 20:28

## 2023-11-17 RX ADMIN — NOREPINEPHRINE BITARTRATE 5 MCG/MIN: 0.06 INJECTION, SOLUTION INTRAVENOUS at 10:28

## 2023-11-17 RX ADMIN — Medication 5 MCG/MIN: at 10:28

## 2023-11-17 ASSESSMENT — PULMONARY FUNCTION TESTS
PIF_VALUE: 16
PIF_VALUE: 17
PIF_VALUE: 16
PIF_VALUE: 17

## 2023-11-17 NOTE — PROCEDURES
EEG REPORT       Patient: Perez Quiroz Age: 81 y.o. MRN: [id number]      Referring Provider: No ref. provider found    History: This routine 4 hours 30 minute scalp EEG was recorded with video- monitoring for a 81 y.o. Ember Pretty female who presented with Cardiac arrest. This EEG was performed to evaluate for focal and epileptiform abnormalities.      Harsha Sierra   Current Facility-Administered Medications   Medication Dose Route Frequency Provider Last Rate Last Admin    norepinephrine (LEVOPHED) 16 mg in sodium chloride 0.9 % 250 mL infusion  1-100 mcg/min IntraVENous Continuous Zhanna Muniz, APRN - CNP   Stopped at 11/15/23 1600    ampicillin-sulbactam (UNASYN) 3,000 mg in sodium chloride 0.9 % 100 mL IVPB (mini-bag)  3,000 mg IntraVENous Q6H Zhanna Muniz, APRN - CNP   Stopped at 11/16/23 1944    0.9 % sodium chloride infusion   IntraVENous PRN Zhanna Muniz, APRN - CNP        insulin glargine (LANTUS) injection vial 30 Units  30 Units SubCUTAneous Nightly Zhanna Muniz, APRN - CNP   30 Units at 11/16/23 2032    metOLazone (ZAROXOLYN) tablet 5 mg  5 mg Oral Daily Zhanna Muniz, APRN - CNP   5 mg at 11/16/23 0959    levETIRAcetam (KEPPRA) 500 mg in sodium chloride 0.9 % 100 mL IVPB  500 mg IntraVENous Q12H Zhanna Muniz, APRN - CNP   Stopped at 11/16/23 1925    sodium chloride (OCEAN, BABY AYR) 0.65 % nasal spray 2 spray  2 spray Each Nostril BID Zhanna Muniz, APRN - CNP   2 spray at 18/32/49 0086    folic acid 1 mg in sodium chloride 0.9 % 50 mL IVPB  1 mg IntraVENous Daily Zhanna Muniz, APRN - CNP   Stopped at 11/16/23 1037    bumetanide (BUMEX) injection 1 mg  1 mg IntraVENous BID Zhanna Muniz, APRN - CNP   1 mg at 11/16/23 2018    [Held by provider] enoxaparin (LOVENOX) injection 90 mg  1 mg/kg SubCUTAneous Q12H Susanne Bauer MD   90 mg at 11/15/23 0856    miconazole (MICOTIN) 2 % powder   Topical BID Zhanna Muniz, APRN - CNP   Given at 11/16/23 2017 rhythm. Interpretation  This EEG was abnormal due to disorganized and slow background in polymorphic delta slowing and occasional periodic discharges with triphasic morphology. Clinical correlation  This EEG was abnormal.  Diffuse low amplitude polymorphic delta slowing and occasional triphasic waves suggested severe encephalopathy. No abnormal epileptiform activity was seen.

## 2023-11-17 NOTE — PROGRESS NOTES
Comprehensive Nutrition Assessment    Type and Reason for Visit:  Reassess (Tube Feed Monitor)    Nutrition Recommendations/Plan:   Continue current tube feed regimen: Vital 1.2 bolus 215 ml every 4 hours to better meet nutritional needs. Free water flush 30 ml before and after each bolus (or per provider). Recommend continue scheduled bowel meds as ordered, previously went 12 days without BM. Malnutrition Assessment:  Malnutrition Status:  Insufficient data (10/13/23 1128)    Context:  Chronic Illness     Findings of the 6 clinical characteristics of malnutrition:  Energy Intake:  Unable to assess (pt. intubated)  Weight Loss:  Greater than 10% over 6 months (31# or 16% in 5 months)     Body Fat Loss:  Unable to assess (facial edema; RN asked not to disturb pt. this am)     Muscle Mass Loss:  No significant muscle mass loss Temples (temporalis), Clavicles (pectoralis & deltoids)  Fluid Accumulation:  Mild     Strength:  Not Performed    Nutrition Assessment:  Pt improving from a nutritional standpoint AEB tolerating Vital 1.2 bolus tube feed at goal while NPO, intubated. 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 Nutrition Related Findings:    Pt. Report/Treatments/Miscellaneous: intubated, bolus feeds per ENT service, tolerating tube feeding per RN  GI Status: hyperactive bowel sounds per RN, last BM x3 on 11/16; pt earlier this admit went 12 days without a BM.   Pertinent Labs: Sodium 137, Potassium 4.1, BUN 41, Creatinine 0.7, Glucose 139  Pertinent Meds: bumex, pepcid, folic acid, lantus, humalog, keppra, levophed, glycolax PRN, fibercon PRN  Wound Type: Pressure Injury, Stage II (coccyx; stage 1 buttocks;

## 2023-11-17 NOTE — PROGRESS NOTES
Patient:  Nakia Fraser    Unit/Bed:4D-08/008-A  MRN: [id number]   PCP: Pineda Thorne MD  Date of Admission: 10/12/2023    Assessment and Plan(All pulmonary edema, renal failure, PE, and respiratory failure diagnoses are acute in nature unless otherwise specified): Anoxic Brain Injury: As of 11/13/23, the highest GCS obtained is 8. Patient continues with ventilator support to allow time to determine extent of recovery. Injury is severe with LOC at persistent vegetative state. Multiple EEGs and continuous EEG show no seizure activity. EEG is consistent with severe encephalopathy. MRI and CT head show no structural injury. Patient did not qualify for hypothermia therapy as she had respiratory arrest leading to cardiac arrest.  There is no data to show recovery benefit in this population. She did receive hypertonic saline to minimize risk of cerebral edema keeping serum sodium 145-150. Patient on Provigil for neuro-stimulation. Peak level occurred 11/10/23. Keppra dosing decreased 11/13/23. If no seizure activity by 11/28/23, plan to cut dose to 250 mg bid. Look to discontinue Keppra if no seizures for 2 weeks after that. EEG completed 11/16/23 shows ongoing cortical dysfunction. Seems to have reached plateau of neurologic recovery. Assess for any improvement over the next 2 weeks. If no further recovery, will discuss parameters of care with family. Best GCS is 7 but seems to hover at 5. Acute Respiratory Failure: Intubated with difficulty on 10/16/23 secondary to complete airway collapse with no capacity for air exchange. What air was in the lungs was trapped in the lungs. The amount of laryngeal edema was intense and required a stylette to penetrate the edema and secure the airway. Patient remains on mechanical ventilator for airway protection. Continue with ventilator support. Family reports patient would not want tracheostomy tube again-ever.   No plans for extubation at this

## 2023-11-17 NOTE — PROGRESS NOTES
Patient:  Olena Nickerson    Unit/Bed:4D-08/008-A  MRN: [id number]   PCP: Rogelio Hua MD  Date of Admission: 10/12/2023    Assessment and Plan(All pulmonary edema, renal failure, PE, and respiratory failure diagnoses are acute in nature unless otherwise specified): Anoxic Brain Injury: As of 11/13/23, the highest GCS obtained is 8. Patient continues with ventilator support to allow time to determine extent of recovery. Injury is severe with LOC at persistent vegetative state. Multiple EEGs and continuous EEG show no seizure activity. EEG is consistent with severe encephalopathy. MRI and CT head show no structural injury. Patient did not qualify for hypothermia therapy as she had respiratory arrest leading to cardiac arrest.  There is no data to show recovery benefit in this population. She did receive hypertonic saline to minimize risk of cerebral edema keeping serum sodium 145-150. Patient on Provigil for neuro-stimulation. Peak level occurred 11/10/23. Keppra dosing decreased 11/13/23. If no seizure activity by 11/28/23, plan to cut dose to 250 mg bid. Look to discontinue Keppra if no seizures for 2 weeks after that. EEG completed 11/16/23 shows ongoing cortical dysfunction. Seems to have reached plateau of neurologic recovery. Assess for any improvement over the next 2 weeks. If no further recovery, will discuss parameters of care with family. Acute Respiratory Failure: Intubated with difficulty on 10/16/23 secondary to complete airway collapse with no capacity for air exchange. What air was in the lungs was trapped in the lungs. The amount of laryngeal edema was intense and required a stylette to penetrate the edema and secure the airway. Patient remains on mechanical ventilator for airway protection. Continue with ventilator support. Family reports patient would not want tracheostomy tube again-ever. No plans for extubation at this time.   Post Cricotracheal resection with

## 2023-11-17 NOTE — CARE COORDINATION
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 There is also nonocclusive thrombus within the left posterior tibial vein  10/16 Extubated to bipap  10/16 Code Blue  10/16 Reintubated  10/16 Bronch: thick bloody secretions in RLL removed  10/16 CXR: Mild stable cardiomegaly with pulmonary vascular congestion suspicious for congestive heart failure; Prominent pulmonary interstitium suspicious for pulmonary edema  10/17 5 hr EEG: No seizures noted; diffuse background attenuation and suppression without evidence of reactivity; severe encephalopathy  10/17 CT Head: Stable CT appearance the brain. No acute findings  10/17 MRI Brain: No acute findings  09/93 PICC left basilic  82/04 CT Head: No acute findings  10/18 OR: ET Exchange bronhoscopy and lavage  10/19 4hr 40 min EEG: This EEG was abnormal. The background abnormalities indicated a moderate to severe encephalopathy, nonspecific to etiology. The generalized periodic discharges (GPDs) indicated underlying cortical irritability/increased risk of seizures, and can be seen in various encephalopathies. No seizures were recorded  10/19 MRI Brain: No evidence of an acute infarct. No evidence of anoxia; Stable mild severity chronic small vessel ischemic changes; Global volume loss  10/23 4-day EEG: Moderate to severe encephalopathy, improved as recording progressed.  Frequent and at times near continuous runs of GPDs with often typical and at times atypical triphasic fld bolus x1 today. PCP: Zofia Ochoa MD  Readmission Risk Score: 20.8%  Patient Goals/Plan/Treatment Preferences: From home with , plans pending progress.

## 2023-11-17 NOTE — PLAN OF CARE
Problem: Discharge Planning  Goal: Discharge to home or other facility with appropriate resources  11/16/2023 2058 by Montserrat Sosa RN  Outcome: Progressing  Flowsheets (Taken 11/16/2023 2058)  Discharge to home or other facility with appropriate resources:   Identify barriers to discharge with patient and caregiver   Identify discharge learning needs (meds, wound care, etc)   Refer to discharge planning if patient needs post-hospital services based on physician order or complex needs related to functional status, cognitive ability or social support system   Arrange for needed discharge resources and transportation as appropriate  11/16/2023 1828 by Evangelina Hollis RN  Outcome: Progressing  Flowsheets (Taken 11/16/2023 0228 by Montserrat Sosa RN)  Discharge to home or other facility with appropriate resources:   Identify barriers to discharge with patient and caregiver   Arrange for needed discharge resources and transportation as appropriate   Identify discharge learning needs (meds, wound care, etc)   Refer to discharge planning if patient needs post-hospital services based on physician order or complex needs related to functional status, cognitive ability or social support system     Problem: Chronic Conditions and Co-morbidities  Goal: Patient's chronic conditions and co-morbidity symptoms are monitored and maintained or improved  11/16/2023 2058 by Montserrat Sosa RN  Outcome: Progressing  Flowsheets (Taken 11/16/2023 2058)  Care Plan - Patient's Chronic Conditions and Co-Morbidity Symptoms are Monitored and Maintained or Improved:   Monitor and assess patient's chronic conditions and comorbid symptoms for stability, deterioration, or improvement   Collaborate with multidisciplinary team to address chronic and comorbid conditions and prevent exacerbation or deterioration   Update acute care plan with appropriate goals if chronic or comorbid symptoms are exacerbated and prevent overall improvement and spiritual pain/suffering and initiate Spiritual Care, Psychosocial Clinical Specialist consults as needed  11/16/2023 2058 by Camila Hayes RN  Outcome: Progressing  Flowsheets (Taken 11/16/2023 2058)  Patient/family able to verbalize anxieties, fears, and concerns, and demonstrate effective coping:   Assist patient/family to identify coping skills, available support systems and cultural and spiritual values   Provide emotional support, including active listening and acknowledgement of concerns of patient and caregivers   Reduce environmental stimuli, as able   Assess for spiritual pain/suffering and initiate Spiritual Care, Psychosocial Clinical Specialist consults as needed  11/16/2023 1828 by Linda Love RN  Outcome: Progressing  Flowsheets (Taken 11/16/2023 0228 by Camila Hayes RN)  Patient/family able to verbalize anxieties, fears, and concerns, and demonstrate effective coping:   Assist patient/family to identify coping skills, available support systems and cultural and spiritual values   Provide emotional support, including active listening and acknowledgement of concerns of patient and caregivers   Reduce environmental stimuli, as able   Assess for spiritual pain/suffering and initiate Spiritual Care, Psychosocial Clinical Specialist consults as needed     Problem: Cardiovascular - Adult  Goal: Maintains optimal cardiac output and hemodynamic stability  11/16/2023 2058 by Camila Hayes RN  Outcome: Progressing  Flowsheets (Taken 11/16/2023 2058)  Maintains optimal cardiac output and hemodynamic stability:   Monitor blood pressure and heart rate   Monitor urine output and notify Licensed Independent Practitioner for values outside of normal range   Assess for signs of decreased cardiac output   Administer fluid and/or volume expanders as ordered   Administer vasoactive medications as ordered  11/16/2023 1828 by Linda Love RN  Outcome: Progressing  [address] (Taken 11/16/2023 0228 by

## 2023-11-17 NOTE — OP NOTE
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 Surgeon(s):  Maricarmen rAias MD    Assistant:   * No surgical staff found *    Anesthesia: General    Estimated Blood Loss (mL): less than 50     Complications: None    Specimens:   ID Type Source Tests Collected by Time Destination   A : Left supraglottic polyp Tissue Vocal Cord SURGICAL PATHOLOGY Maricarmen Arias MD 11/16/2023 1818        Implants:  * No implants in log *      Drains:   NG/OG/NJ/NE Tube Nasogastric Left nostril (Active)   Surrounding Skin Clean, dry & intact 11/16/23 1600   Securement device Bridle 11/16/23 1600   Status Clamped 11/16/23 1600   Placement Verified External Catheter Length 11/16/23 1600   NG/OG/NJ/NE External Measurement (cm) 64 cm 11/16/23 1600   Drainage Appearance Tan 11/16/23 0400   Tube Feeding Other Tube Feeding (specify) 11/16/23 0400   Tube feeding/verify rate (mL/hr) 0 mL/hr 11/16/23 0000   Tube Feeding Intake (mL) 215 ml 11/16/23 2000   Tube Feeding Supplement Amount (mL) 215 11/09/23 1825   Free Water/Flush (mL) 120 mL 11/16/23 2000   Output (mL) 30 ml 11/14/23 6012   Action Taken Feed set changed; Tubing changed 11/15/23 2000   Residual Volume (ml) 0 ml 11/15/23 1615       Urinary Catheter 10/12/23 Wen-Temperature (Active)   Catheter Indications Urinary retention (acute or chronic), continuous bladder irrigation or bladder outlet obstruction; Need for fluid volume management of the critically ill patient in a critical care setting 11/16/23 1600   Site Assessment Trufant 11/16/23 1600   Urine Color Yellow 11/16/23 1600   Urine Appearance Sediment 11/16/23 1600   Urine Odor Malodorous 11/16/23 1600   Collection Container Standard 11/16/23 1600   Securement Method Securing device (Describe) 11/16/23 1600   Catheter Care  Perineal wipes 11/16/23 1600   Catheter Best Practices  Drainage tube clipped to bed;Catheter secured to thigh; Tamper seal intact; Bag below bladder;Bag not on floor; Lack of dependent loop in tubing;Drainage bag less than half full 11/16/23 1600   Status Draining;Patent 11/16/23 1600   Manual Irrigation Volume Input (mL) 50 mL 11/15/23 0110   Output (mL) 150 mL 11/16/23 2000       [REMOVED] Closed/Suction Drain Left; Anterior Neck Bulb (Removed)   Site Description Clean, dry & intact 11/04/23 2003   Dressing Status Clean, dry & intact 11/04/23 2003   Drainage Appearance None 11/04/23 2003   Drain Status Compressed 11/04/23 2003   Output (ml) 0 ml 11/03/23 2000       [REMOVED] Closed/Suction Drain Right; Anterior Neck Bulb (Removed)   Site Description Dry 10/27/23 0400   Dressing Status New dressing applied;Clean, dry & intact 10/27/23 0400   Drainage Appearance Bloody 10/27/23 0400   Drain Status Compressed 10/27/23 0400   Output (ml) 5 ml 10/27/23 0400       [REMOVED] Rectal Tube (Removed)   Site Assessment Clean, dry & intact 10/24/23 0430   Stool Appearance Loose 10/24/23 0430   Stool Color Brown 10/24/23 0430   Stool Amount Small 10/24/23 0000   Rectal Tube Output (mL) 0 ml 10/24/23 0430       Findings: 1. Suspension laryngoscopy was challenging in order to get a direct view of the patient's anterior larynx  2. Once done, postextubation patient's glottic aperture was generous in the left true vocal fold was well lateralized. 3.  Post prolonged intubation disease was limited to multiple naya of mucosalized granulation in the form of nodules and polypoid protrusions of the cords. These were resected and injected with steroids  4. The anastomosis was fully healed with no visible breakdown or extruding sutures. 5.  She was easy to ventilate which have ventilation  6.   There was redundant mucosa of the piriform sinuses and Given the extensive nature of the operation she would need to correct this, amounting to between 2 and 3 hours of laser surgery, my intention is to wait until she declares more about her cognitive abilities before pursuing this option after discussing it with family. As such I finished her procedure by obtaining a cc of 80 mg/cc Depo-Medrol and 3 cc Luer-Mynor syringe with a 7 inch 22-gauge spinal needle which I used to instill this material in the multiple spots along both true cords that had these granuloma like protrusions. I suctioned away the extravasated steroid and consider the operation concluded. I intubated the patient with endoscopic guidance using a 30 degree 30 cm telescope to do so. I placed the distal tip of the ET tube just above the telma and found it to be at 22 cm at the lip. I then remove the transoral system and placed the endotracheal tube emery with cheek adhesives on the side of the face attaching it at the appropriate distance on the emery to keep the tube at 22 cm at the lips. The patient was then transported back to the unit sedated and mechanically ventilated. Estimated blood loss in the case was less than 10 cc and the patient received less than half a liter of intravenous lactated Ringer's intraoperatively. No blood products were transfused. No intraoperative complications were detected. Counts were considered accurate x3. I was present for and participated in the patient's entire operation. Disposition: The patient will be readmitted to the ICU for critical care and to be allowed more time to emerge from her encephalopathy if that is going to happen.   If she is showing significant enough progress that it may be worthwhile to give her a trial of extubation, I will make time to bring her back to the operating room and perform this transoral laser laryngoplasty procedure to remove the redundant tissues that could impede her ability to breathe without can or a

## 2023-11-17 NOTE — PLAN OF CARE
Problem: Respiratory - Adult  Goal: Normal spontaneous ventilation  Outcome: Progressing     Problem: Respiratory - Adult  Goal: Achieves optimal ventilation and oxygenation  11/17/2023 0631 by Isak Karimi RCP  Outcome: Progressing                                                Patient Weaning Progress    The patient's vent settings was able to be weaned this shift. Ventilator settings that were weaned              [x] Mode   [] Pressure support weaned   [] Fio2 weaned   [] Peep weaned      Spontaneous weaning trial  was attempted. *Results of SBT documented under SBTOUTCOME note. Reason that defined ventilator parameters for SBT was not met              [] Patient condition requires increased ventilator settings  [] Requires increased sedation   [] Settings not within weaning range   [] SAT not completed   [] Physician orders    The patient was able to tolerate SBT. RSBI  was 40 with EtCO2 of 41 and SpO2 of 100 on 35% FiO2. Spontanteous VT was 374 and RR 15 breaths/min. The patient remains on sbt     Evac tube was  hooked up with continuous low suction(20-30mmHg)      Cuff was not  deflated to determine cuff leak. Unable to get agreement for goals because no family is present and patient cannot respond.

## 2023-11-17 NOTE — ANESTHESIA POSTPROCEDURE EVALUATION
Department of Anesthesiology  Postprocedure Note    Patient: Royal Baez  MRN: [id number]  YOB: 1950  Date of evaluation: 2023      Procedure Summary     Date: 23 Room / Location: [facility] [address] [facility]    Anesthesia Start: 1488 Anesthesia Stop:     Procedure: Suspension Laryngoscopy and Bronchoscopy, with Debridement and steroid injection. 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 Patricia Phase I: Patricia Score: 10    Patricia Phase II:        Anesthesia Post Evaluation    Patient location during evaluation: ICU  Patient participation: complete - patient cannot participate  Level of consciousness: responsive to noxious stimuli  Airway patency: patent  Nausea & Vomiting: no vomiting and no nausea  Complications: no  Cardiovascular status: hemodynamically stable and vasoactive/inotropes  Respiratory status: intubated and ventilator  Hydration status: stable  Comments: Not able to wean ventilator at this time due to ongoing neurological status. Nursing staff states BP was low this morning and they started a Levophed drip. Critical care order PRBC for low hgb.   Pain management: adequate

## 2023-11-18 LAB
ANION GAP SERPL CALC-SCNC: 8 MEQ/L (ref 8–16)
BASOPHILS ABSOLUTE: 0 THOU/MM3 (ref 0–0.1)
BASOPHILS NFR BLD AUTO: 0.5 %
BUN SERPL-MCNC: 46 MG/DL (ref 7–22)
CA-I BLD ISE-SCNC: 1.23 MMOL/L (ref 1.12–1.32)
CALCIUM SERPL-MCNC: 9.2 MG/DL (ref 8.5–10.5)
CHLORIDE SERPL-SCNC: 101 MEQ/L (ref 98–111)
CO2 SERPL-SCNC: 31 MEQ/L (ref 23–33)
CREAT SERPL-MCNC: 0.7 MG/DL (ref 0.4–1.2)
DEPRECATED RDW RBC AUTO: 58.7 FL (ref 35–45)
EOSINOPHIL NFR BLD AUTO: 2.6 %
EOSINOPHILS ABSOLUTE: 0.2 THOU/MM3 (ref 0–0.4)
ERYTHROCYTE [DISTWIDTH] IN BLOOD BY AUTOMATED COUNT: 17.9 % (ref 11.5–14.5)
GFR SERPL CREATININE-BSD FRML MDRD: > 60 ML/MIN/1.73M2
GLUCOSE BLD STRIP.AUTO-MCNC: 109 MG/DL (ref 70–108)
GLUCOSE BLD STRIP.AUTO-MCNC: 142 MG/DL (ref 70–108)
GLUCOSE BLD STRIP.AUTO-MCNC: 149 MG/DL (ref 70–108)
GLUCOSE BLD STRIP.AUTO-MCNC: 154 MG/DL (ref 70–108)
GLUCOSE BLD STRIP.AUTO-MCNC: 168 MG/DL (ref 70–108)
GLUCOSE BLD STRIP.AUTO-MCNC: 175 MG/DL (ref 70–108)
GLUCOSE BLD STRIP.AUTO-MCNC: 192 MG/DL (ref 70–108)
GLUCOSE SERPL-MCNC: 153 MG/DL (ref 70–108)
HCT VFR BLD AUTO: 28.7 % (ref 37–47)
HGB BLD-MCNC: 9.2 GM/DL (ref 12–16)
IMM GRANULOCYTES # BLD AUTO: 0.06 THOU/MM3 (ref 0–0.07)
IMM GRANULOCYTES NFR BLD AUTO: 0.9 %
LYMPHOCYTES ABSOLUTE: 1.7 THOU/MM3 (ref 1–4.8)
LYMPHOCYTES NFR BLD AUTO: 25.5 %
MAGNESIUM SERPL-MCNC: 1.8 MG/DL (ref 1.6–2.4)
MCH RBC QN AUTO: 28.8 PG (ref 26–33)
MCHC RBC AUTO-ENTMCNC: 32.1 GM/DL (ref 32.2–35.5)
MCV RBC AUTO: 90 FL (ref 81–99)
MONOCYTES ABSOLUTE: 0.6 THOU/MM3 (ref 0.4–1.3)
MONOCYTES NFR BLD AUTO: 9.6 %
NEUTROPHILS NFR BLD AUTO: 60.9 %
NRBC BLD AUTO-RTO: 0 /100 WBC
PLATELET # BLD AUTO: 222 THOU/MM3 (ref 130–400)
PMV BLD AUTO: 9.7 FL (ref 9.4–12.4)
POTASSIUM SERPL-SCNC: 3.7 MEQ/L (ref 3.5–5.2)
RBC # BLD AUTO: 3.19 MILL/MM3 (ref 4.2–5.4)
SEGMENTED NEUTROPHILS ABSOLUTE COUNT: 4 THOU/MM3 (ref 1.8–7.7)
SODIUM SERPL-SCNC: 140 MEQ/L (ref 135–145)
WBC # BLD AUTO: 6.5 THOU/MM3 (ref 4.8–10.8)

## 2023-11-18 PROCEDURE — 6360000002 HC RX W HCPCS: Performed by: NURSE PRACTITIONER

## 2023-11-18 PROCEDURE — 6370000000 HC RX 637 (ALT 250 FOR IP): Performed by: NURSE PRACTITIONER

## 2023-11-18 PROCEDURE — 2580000003 HC RX 258: Performed by: NURSE PRACTITIONER

## 2023-11-18 PROCEDURE — 99291 CRITICAL CARE FIRST HOUR: CPT | Performed by: INTERNAL MEDICINE

## 2023-11-18 PROCEDURE — 36415 COLL VENOUS BLD VENIPUNCTURE: CPT

## 2023-11-18 PROCEDURE — 2500000003 HC RX 250 WO HCPCS: Performed by: NURSE PRACTITIONER

## 2023-11-18 PROCEDURE — 2700000000 HC OXYGEN THERAPY PER DAY

## 2023-11-18 PROCEDURE — 82948 REAGENT STRIP/BLOOD GLUCOSE: CPT

## 2023-11-18 PROCEDURE — 94761 N-INVAS EAR/PLS OXIMETRY MLT: CPT

## 2023-11-18 PROCEDURE — 83735 ASSAY OF MAGNESIUM: CPT

## 2023-11-18 PROCEDURE — 2000000000 HC ICU R&B

## 2023-11-18 PROCEDURE — 94003 VENT MGMT INPAT SUBQ DAY: CPT

## 2023-11-18 PROCEDURE — 6370000000 HC RX 637 (ALT 250 FOR IP): Performed by: INTERNAL MEDICINE

## 2023-11-18 PROCEDURE — 80048 BASIC METABOLIC PNL TOTAL CA: CPT

## 2023-11-18 PROCEDURE — 85025 COMPLETE CBC W/AUTO DIFF WBC: CPT

## 2023-11-18 PROCEDURE — 82330 ASSAY OF CALCIUM: CPT

## 2023-11-18 RX ADMIN — FAMOTIDINE 20 MG: 20 TABLET ORAL at 20:50

## 2023-11-18 RX ADMIN — LEVETIRACETAM 500 MG: 100 INJECTION, SOLUTION INTRAVENOUS at 18:36

## 2023-11-18 RX ADMIN — SALINE NASAL SPRAY 2 SPRAY: 1.5 SOLUTION NASAL at 07:53

## 2023-11-18 RX ADMIN — SODIUM CHLORIDE 3000 MG: 900 INJECTION INTRAVENOUS at 12:21

## 2023-11-18 RX ADMIN — INSULIN GLARGINE 30 UNITS: 100 INJECTION, SOLUTION SUBCUTANEOUS at 21:35

## 2023-11-18 RX ADMIN — LEVETIRACETAM 500 MG: 100 INJECTION, SOLUTION INTRAVENOUS at 05:21

## 2023-11-18 RX ADMIN — MICONAZOLE NITRATE: 2 POWDER TOPICAL at 20:51

## 2023-11-18 RX ADMIN — SALINE NASAL SPRAY 2 SPRAY: 1.5 SOLUTION NASAL at 20:50

## 2023-11-18 RX ADMIN — SODIUM CHLORIDE 3000 MG: 900 INJECTION INTRAVENOUS at 00:21

## 2023-11-18 RX ADMIN — FAMOTIDINE 20 MG: 20 TABLET ORAL at 07:53

## 2023-11-18 RX ADMIN — FOLIC ACID 1 MG: 5 INJECTION, SOLUTION INTRAMUSCULAR; INTRAVENOUS; SUBCUTANEOUS at 08:23

## 2023-11-18 RX ADMIN — SODIUM CHLORIDE 3000 MG: 900 INJECTION INTRAVENOUS at 06:04

## 2023-11-18 RX ADMIN — MICONAZOLE NITRATE: 2 POWDER TOPICAL at 07:53

## 2023-11-18 RX ADMIN — SODIUM CHLORIDE, PRESERVATIVE FREE 10 ML: 5 INJECTION INTRAVENOUS at 20:51

## 2023-11-18 RX ADMIN — CHLORHEXIDINE GLUCONATE 0.12% ORAL RINSE 15 ML: 1.2 LIQUID ORAL at 07:53

## 2023-11-18 RX ADMIN — MODAFINIL 200 MG: 100 TABLET ORAL at 07:53

## 2023-11-18 RX ADMIN — BUMETANIDE 1 MG: 1 TABLET ORAL at 07:52

## 2023-11-18 RX ADMIN — SODIUM CHLORIDE, PRESERVATIVE FREE 10 ML: 5 INJECTION INTRAVENOUS at 07:52

## 2023-11-18 RX ADMIN — CHLORHEXIDINE GLUCONATE 0.12% ORAL RINSE 15 ML: 1.2 LIQUID ORAL at 20:50

## 2023-11-18 RX ADMIN — BUMETANIDE 1 MG: 1 TABLET ORAL at 20:50

## 2023-11-18 ASSESSMENT — PULMONARY FUNCTION TESTS
PIF_VALUE: 16
PIF_VALUE: 15
PIF_VALUE: 17
PIF_VALUE: 17
PIF_VALUE: 15
PIF_VALUE: 15

## 2023-11-18 NOTE — PLAN OF CARE
Problem: Discharge Planning  Goal: Discharge to home or other facility with appropriate resources  Outcome: Progressing  Flowsheets (Taken 11/17/2023 2000)  Discharge to home or other facility with appropriate resources: Identify barriers to discharge with patient and caregiver     Problem: Chronic Conditions and Co-morbidities  Goal: Patient's chronic conditions and co-morbidity symptoms are monitored and maintained or improved  Outcome: Progressing     Problem: Safety - Adult  Goal: Free from fall injury  Outcome: Progressing  Note: Falling star program remains in place. Call light and personal belongings within reach. Frequent visual monitoring continues. Toileting program inplace. Patient assisted in turning/repositioning at least once every 2 hours, and on a prn basis. Problem: Respiratory - Adult  Goal: Achieves optimal ventilation and oxygenation  Outcome: Progressing     Problem: Neurosensory - Adult  Goal: Achieves stable or improved neurological status  Outcome: Progressing  Goal: Absence of seizures  Outcome: Progressing     Problem: Skin/Tissue Integrity - Adult  Goal: Incisions, wounds, or drain sites healing without S/S of infection  Outcome: Progressing  Note: Monitoring patient skin integrity for skin breakdown, turning and repositioning q2h per protocol. Problem: Gastrointestinal - Adult  Goal: Maintains adequate nutritional intake  Outcome: Progressing     Problem: Genitourinary - Adult  Goal: Urinary catheter remains patent  Outcome: Progressing     Problem: Cardiovascular - Adult  Goal: Maintains optimal cardiac output and hemodynamic stability  Outcome: Progressing     Problem: Musculoskeletal - Adult  Goal: Maintain proper alignment of affected body part  Outcome: Progressing     Problem: Nutrition Deficit:  Goal: Optimize nutritional status  Outcome: Progressing     Problem: Skin/Tissue Integrity  Goal: Absence of new skin breakdown  Description: 1.   Monitor for areas of redness

## 2023-11-18 NOTE — PLAN OF CARE
Patient Weaning Progress    The patient's vent settings was able to be weaned this shift. Ventilator settings that were weaned              [] Mode   [x] Pressure support weaned   [] Fio2 weaned   [] Peep weaned      Spontaneous weaning trial  was attempted. due to defined parameters for SBT (spontaneous breathing trial) not being met. *Results of SBT documented under SBTOUTCOME note. Reason that defined ventilator parameters for SBT was not met              [] Patient condition requires increased ventilator settings  [] Requires increased sedation   [] Settings not within weaning range   [] SAT not completed   [] Physician orders    The patient was able to tolerate SBT. Evac tube was  hooked up with continuous low suction(20-30mmHg)              Unable to get agreement for goals because no family is present and patient cannot respond.

## 2023-11-18 NOTE — PLAN OF CARE
Problem: Respiratory - Adult  Goal: Normal spontaneous ventilation  Outcome: Progressing     Problem: Respiratory - Adult  Goal: Achieves optimal ventilation and oxygenation  11/18/2023 0511 by Vale Mehta RCP  Outcome: Progressing                                                Patient Weaning Progress    The patient's vent settings was able to be weaned this shift. Ventilator settings that were weaned              [x] Mode   [] Pressure support weaned   [] Fio2 weaned   [] Peep weaned      Spontaneous weaning trial  was attempted. due to defined parameters for SBT (spontaneous breathing trial) not being met. *Results of SBT documented under SBTOUTCOME note. Reason that defined ventilator parameters for SBT was not met              [] Patient condition requires increased ventilator settings  [] Requires increased sedation   [] Settings not within weaning range   [] SAT not completed   [] Physician orders    The patient was able to tolerate SBT. RSBI  was 47 with EtCO2 of 45 and SpO2 of 98 on 30% FiO2. Spontanteous VT was 340 and RR 16 breaths/min. The patient remains on sbt     Evac tube was  hooked up with continuous low suction(20-30mmHg)      Cuff was not  deflated to determine cuff leak. Unable to get agreement for goals because no family is present and patient cannot respond.

## 2023-11-18 NOTE — FLOWSHEET NOTE
0750  eyes open with turning. No focus or tracking. Starts chewing motion with suctioning mouth and bits down. Has + gag and cough. No reaction to threat with eyes.

## 2023-11-19 LAB
ALBUMIN SERPL BCG-MCNC: 3.4 G/DL (ref 3.5–5.1)
ALP SERPL-CCNC: 157 U/L (ref 38–126)
ALT SERPL W/O P-5'-P-CCNC: 30 U/L (ref 11–66)
ANION GAP SERPL CALC-SCNC: 9 MEQ/L (ref 8–16)
AST SERPL-CCNC: 37 U/L (ref 5–40)
BASOPHILS ABSOLUTE: 0 THOU/MM3 (ref 0–0.1)
BASOPHILS NFR BLD AUTO: 0.4 %
BILIRUB SERPL-MCNC: 0.3 MG/DL (ref 0.3–1.2)
BUN SERPL-MCNC: 45 MG/DL (ref 7–22)
CALCIUM SERPL-MCNC: 9.3 MG/DL (ref 8.5–10.5)
CHLORIDE SERPL-SCNC: 100 MEQ/L (ref 98–111)
CO2 SERPL-SCNC: 29 MEQ/L (ref 23–33)
CREAT SERPL-MCNC: 0.6 MG/DL (ref 0.4–1.2)
DEPRECATED RDW RBC AUTO: 56.3 FL (ref 35–45)
EOSINOPHIL NFR BLD AUTO: 2.4 %
EOSINOPHILS ABSOLUTE: 0.2 THOU/MM3 (ref 0–0.4)
ERYTHROCYTE [DISTWIDTH] IN BLOOD BY AUTOMATED COUNT: 16.9 % (ref 11.5–14.5)
GFR SERPL CREATININE-BSD FRML MDRD: > 60 ML/MIN/1.73M2
GLUCOSE BLD STRIP.AUTO-MCNC: 140 MG/DL (ref 70–108)
GLUCOSE BLD STRIP.AUTO-MCNC: 160 MG/DL (ref 70–108)
GLUCOSE BLD STRIP.AUTO-MCNC: 229 MG/DL (ref 70–108)
GLUCOSE SERPL-MCNC: 191 MG/DL (ref 70–108)
HCT VFR BLD AUTO: 30.3 % (ref 37–47)
HGB BLD-MCNC: 9.8 GM/DL (ref 12–16)
IMM GRANULOCYTES # BLD AUTO: 0.05 THOU/MM3 (ref 0–0.07)
IMM GRANULOCYTES NFR BLD AUTO: 0.7 %
LYMPHOCYTES ABSOLUTE: 1.6 THOU/MM3 (ref 1–4.8)
LYMPHOCYTES NFR BLD AUTO: 22.4 %
MAGNESIUM SERPL-MCNC: 1.8 MG/DL (ref 1.6–2.4)
MCH RBC QN AUTO: 29.4 PG (ref 26–33)
MCHC RBC AUTO-ENTMCNC: 32.3 GM/DL (ref 32.2–35.5)
MCV RBC AUTO: 91 FL (ref 81–99)
MONOCYTES ABSOLUTE: 0.6 THOU/MM3 (ref 0.4–1.3)
MONOCYTES NFR BLD AUTO: 8.7 %
NEUTROPHILS NFR BLD AUTO: 65.4 %
NRBC BLD AUTO-RTO: 0 /100 WBC
PHOSPHATE SERPL-MCNC: 3.8 MG/DL (ref 2.4–4.7)
PLATELET # BLD AUTO: 250 THOU/MM3 (ref 130–400)
PMV BLD AUTO: 9.9 FL (ref 9.4–12.4)
POTASSIUM SERPL-SCNC: 3.7 MEQ/L (ref 3.5–5.2)
PROT SERPL-MCNC: 6.1 G/DL (ref 6.1–8)
RBC # BLD AUTO: 3.33 MILL/MM3 (ref 4.2–5.4)
SEGMENTED NEUTROPHILS ABSOLUTE COUNT: 4.6 THOU/MM3 (ref 1.8–7.7)
SODIUM SERPL-SCNC: 138 MEQ/L (ref 135–145)
WBC # BLD AUTO: 7.1 THOU/MM3 (ref 4.8–10.8)

## 2023-11-19 PROCEDURE — 82948 REAGENT STRIP/BLOOD GLUCOSE: CPT

## 2023-11-19 PROCEDURE — 2500000003 HC RX 250 WO HCPCS: Performed by: NURSE PRACTITIONER

## 2023-11-19 PROCEDURE — 6370000000 HC RX 637 (ALT 250 FOR IP): Performed by: NURSE PRACTITIONER

## 2023-11-19 PROCEDURE — 6360000002 HC RX W HCPCS: Performed by: NURSE PRACTITIONER

## 2023-11-19 PROCEDURE — 2580000003 HC RX 258: Performed by: NURSE PRACTITIONER

## 2023-11-19 PROCEDURE — 94761 N-INVAS EAR/PLS OXIMETRY MLT: CPT

## 2023-11-19 PROCEDURE — 80053 COMPREHEN METABOLIC PANEL: CPT

## 2023-11-19 PROCEDURE — 36592 COLLECT BLOOD FROM PICC: CPT

## 2023-11-19 PROCEDURE — 6370000000 HC RX 637 (ALT 250 FOR IP): Performed by: INTERNAL MEDICINE

## 2023-11-19 PROCEDURE — 2000000000 HC ICU R&B

## 2023-11-19 PROCEDURE — 99024 POSTOP FOLLOW-UP VISIT: CPT | Performed by: NURSE PRACTITIONER

## 2023-11-19 PROCEDURE — 83735 ASSAY OF MAGNESIUM: CPT

## 2023-11-19 PROCEDURE — 2700000000 HC OXYGEN THERAPY PER DAY

## 2023-11-19 PROCEDURE — 99232 SBSQ HOSP IP/OBS MODERATE 35: CPT | Performed by: INTERNAL MEDICINE

## 2023-11-19 PROCEDURE — 84100 ASSAY OF PHOSPHORUS: CPT

## 2023-11-19 PROCEDURE — 85025 COMPLETE CBC W/AUTO DIFF WBC: CPT

## 2023-11-19 PROCEDURE — 36415 COLL VENOUS BLD VENIPUNCTURE: CPT

## 2023-11-19 RX ADMIN — CALCIUM POLYCARBOPHIL 625 MG: 625 TABLET, FILM COATED ORAL at 08:55

## 2023-11-19 RX ADMIN — MICONAZOLE NITRATE: 2 POWDER TOPICAL at 20:19

## 2023-11-19 RX ADMIN — FAMOTIDINE 20 MG: 20 TABLET ORAL at 20:19

## 2023-11-19 RX ADMIN — SODIUM CHLORIDE, PRESERVATIVE FREE 10 ML: 5 INJECTION INTRAVENOUS at 08:55

## 2023-11-19 RX ADMIN — INSULIN GLARGINE 30 UNITS: 100 INJECTION, SOLUTION SUBCUTANEOUS at 20:18

## 2023-11-19 RX ADMIN — FOLIC ACID 1 MG: 5 INJECTION, SOLUTION INTRAMUSCULAR; INTRAVENOUS; SUBCUTANEOUS at 09:03

## 2023-11-19 RX ADMIN — SODIUM CHLORIDE, PRESERVATIVE FREE 10 ML: 5 INJECTION INTRAVENOUS at 20:19

## 2023-11-19 RX ADMIN — SALINE NASAL SPRAY 2 SPRAY: 1.5 SOLUTION NASAL at 20:22

## 2023-11-19 RX ADMIN — CHLORHEXIDINE GLUCONATE 0.12% ORAL RINSE 15 ML: 1.2 LIQUID ORAL at 08:57

## 2023-11-19 RX ADMIN — INSULIN LISPRO 4 UNITS: 100 INJECTION, SOLUTION INTRAVENOUS; SUBCUTANEOUS at 18:02

## 2023-11-19 RX ADMIN — BUMETANIDE 1 MG: 1 TABLET ORAL at 20:22

## 2023-11-19 RX ADMIN — FAMOTIDINE 20 MG: 20 TABLET ORAL at 08:55

## 2023-11-19 RX ADMIN — CHLORHEXIDINE GLUCONATE 0.12% ORAL RINSE 15 ML: 1.2 LIQUID ORAL at 20:19

## 2023-11-19 RX ADMIN — MODAFINIL 200 MG: 100 TABLET ORAL at 09:03

## 2023-11-19 RX ADMIN — SALINE NASAL SPRAY 2 SPRAY: 1.5 SOLUTION NASAL at 08:57

## 2023-11-19 RX ADMIN — BUMETANIDE 1 MG: 1 TABLET ORAL at 08:55

## 2023-11-19 RX ADMIN — LEVETIRACETAM 500 MG: 100 INJECTION, SOLUTION INTRAVENOUS at 05:40

## 2023-11-19 RX ADMIN — MICONAZOLE NITRATE: 2 POWDER TOPICAL at 08:56

## 2023-11-19 RX ADMIN — LEVETIRACETAM 500 MG: 100 INJECTION, SOLUTION INTRAVENOUS at 17:58

## 2023-11-19 ASSESSMENT — PULMONARY FUNCTION TESTS
PIF_VALUE: 15
PIF_VALUE: 14
PIF_VALUE: 15

## 2023-11-19 NOTE — PLAN OF CARE
Problem: Respiratory - Adult  Goal: Achieves optimal ventilation and oxygenation  11/19/2023 1349 by Shawnee Santos RCP  Outcome: Progressing  Note: Vent settings optimized to achieve target tidal volume, respiratory rate and ideal oxygen saturations. Will continue to wean as patient tolerates. SBT will be performed when appropriate. Patient Weaning Progress    Spontaneous weaning trial is continued. Patient has been on SBT since 11/17 0555. Evac tube was  hooked up with continuous low suction(20-30mmHg)      Cuff was not deflated to determine cuff leak. Unable to get agreement for goals because no family is present and patient cannot respond.

## 2023-11-19 NOTE — PLAN OF CARE
Problem: Discharge Planning  Goal: Discharge to home or other facility with appropriate resources  11/19/2023 0325 by Jessica Irene RN  Outcome: Progressing  Flowsheets (Taken 11/18/2023 2000)  Discharge to home or other facility with appropriate resources:   Identify barriers to discharge with patient and caregiver   Arrange for needed discharge resources and transportation as appropriate   Identify discharge learning needs (meds, wound care, etc)  11/18/2023 1904 by Yessy Reyes RN  Outcome: Progressing  Flowsheets (Taken 11/17/2023 2000 by Artie Keys, YVETTE)  Discharge to home or other facility with appropriate resources: Identify barriers to discharge with patient and caregiver     Problem: Chronic Conditions and Co-morbidities  Goal: Patient's chronic conditions and co-morbidity symptoms are monitored and maintained or improved  11/19/2023 0325 by Jessica Irene RN  Outcome: Progressing  Flowsheets (Taken 11/18/2023 2000)  Care Plan - Patient's Chronic Conditions and Co-Morbidity Symptoms are Monitored and Maintained or Improved:   Monitor and assess patient's chronic conditions and comorbid symptoms for stability, deterioration, or improvement   Collaborate with multidisciplinary team to address chronic and comorbid conditions and prevent exacerbation or deterioration   Update acute care plan with appropriate goals if chronic or comorbid symptoms are exacerbated and prevent overall improvement and discharge  11/18/2023 1904 by Yessy Reyes RN  Outcome: Progressing  [address] (Taken 11/16/2023 2058 by Asim Jacome RN)  Care Plan - Patient's Chronic Conditions and Co-Morbidity Symptoms are Monitored and Maintained or Improved:   Monitor and assess patient's chronic conditions and comorbid symptoms for stability, deterioration, or improvement   Collaborate with multidisciplinary team to address chronic and comorbid conditions and prevent exacerbation or deterioration   Update acute of concerns of patient and caregivers   Reduce environmental stimuli, as able   Assess for spiritual pain/suffering and initiate Spiritual Care, Psychosocial Clinical Specialist consults as needed     Problem: Cardiovascular - Adult  Goal: Maintains optimal cardiac output and hemodynamic stability  11/19/2023 0325 by Charisma Suero RN  Outcome: Progressing  Flowsheets (Taken 11/18/2023 2000)  Maintains optimal cardiac output and hemodynamic stability:   Monitor blood pressure and heart rate   Monitor urine output and notify Licensed Independent Practitioner for values outside of normal range   Assess for signs of decreased cardiac output   Administer fluid and/or volume expanders as ordered   Administer vasoactive medications as ordered  11/18/2023 1904 by Stacey Moya RN  Outcome: Progressing  [address] (Taken 11/16/2023 2058 by Wilbur Chowdary, RN)  Maintains optimal cardiac output and hemodynamic stability:   Monitor blood pressure and heart rate   Monitor urine output and notify Licensed Independent Practitioner for values outside of normal range   Assess for signs of decreased cardiac output   Administer fluid and/or volume expanders as ordered   Administer vasoactive medications as ordered     Problem: ABCDS Injury Assessment  Goal: Absence of physical injury  11/19/2023 0325 by Charisma Suero RN  Outcome: Progressing  11/18/2023 1904 by Stacey Moya RN  Outcome: Progressing  Flowsheets (Taken 11/16/2023 2058 by Wilbur Chowdary, RN)  Absence of Physical Injury: Implement safety measures based on patient assessment     Problem: Musculoskeletal - Adult  Goal: Maintain proper alignment of affected body part  11/19/2023 0325 by Charisma Suero RN  Outcome: Progressing  Flowsheets (Taken 11/18/2023 2000)  Maintain proper alignment of affected body part: Support and protect limb and body alignment per provider's orders  11/18/2023 1904 by Stacey Moya RN  Outcome: Progressing  Flowsheets (Taken 11/16/2023 2058 by Refugio Hernandez RN)  Maintain proper alignment of affected body part:   Support and protect limb and body alignment per provider's orders   Instruct and reinforce with patient and family use of appropriate assistive device and precautions (e.g. spinal or hip dislocation precautions)

## 2023-11-19 NOTE — PROGRESS NOTES
Patient Weaning Progress    The patient's vent settings was not able to be weaned this shift. Ventilator settings that were weaned              [] Mode   [] Pressure support weaned   [] Fio2 weaned   [] Peep weaned      Spontaneous weaning trial  was attempted. Patient has been on SBT since 11/17/23 @ 0555    due to defined parameters for SBT (spontaneous breathing trial) not being met. *Results of SBT documented under SBTOUTCOME note. Reason that defined ventilator parameters for SBT was not met              [] Patient condition requires increased ventilator settings  [] Requires increased sedation   [] Settings not within weaning range   [] SAT not completed   [] Physician orders    The patient was able to tolerate SBT. RSBI  was 31.75 with EtCO2 of 44 and SpO2 of 98 on 30% FiO2. Spontanteous VT was 504 and RR 16 breaths/min. The patient was has been on SBT since 11/17/23 @ 0555. Evac tube was  hooked up with continuous low suction(20-30mmHg)      Cuff was not  deflated to determine cuff leak. Unable to get agreement for goals because no family is present and patient cannot respond.

## 2023-11-19 NOTE — PLAN OF CARE
Problem: Discharge Planning  Goal: Discharge to home or other facility with appropriate resources  Outcome: Progressing  Flowsheets (Taken 11/17/2023 2000 by Jade Osborne RN)  Discharge to home or other facility with appropriate resources: Identify barriers to discharge with patient and caregiver     Problem: Chronic Conditions and Co-morbidities  Goal: Patient's chronic conditions and co-morbidity symptoms are monitored and maintained or improved  Outcome: Progressing  Flowsheets (Taken 11/16/2023 2058 by Anamika Mars RN)  Care Plan - Patient's Chronic Conditions and Co-Morbidity Symptoms are Monitored and Maintained or Improved:   Monitor and assess patient's chronic conditions and comorbid symptoms for stability, deterioration, or improvement   Collaborate with multidisciplinary team to address chronic and comorbid conditions and prevent exacerbation or deterioration   Update acute care plan with appropriate goals if chronic or comorbid symptoms are exacerbated and prevent overall improvement and discharge     Problem: Safety - Adult  Goal: Free from fall injury  Outcome: Progressing  Flowsheets (Taken 11/16/2023 2058 by Anamika Mars RN)  Free From Fall Injury:   Instruct family/caregiver on patient safety   Based on caregiver fall risk screen, instruct family/caregiver to ask for assistance with transferring infant if caregiver noted to have fall risk factors     Problem: Pain  Goal: Verbalizes/displays adequate comfort level or baseline comfort level  Outcome: Progressing  Flowsheets (Taken 11/16/2023 2058 by Anamika Mars RN)  Verbalizes/displays adequate comfort level or baseline comfort level:   Encourage patient to monitor pain and request assistance   Administer analgesics based on type and severity of pain and evaluate response   Consider cultural and social influences on pain and pain management   Assess pain using appropriate pain scale   Implement non-pharmacological measures as Neurosensory - Adult  Goal: Achieves stable or improved neurological status  Outcome: Progressing  Flowsheets (Taken 11/16/2023 2058 by Kattie Sicard, RN)  Achieves stable or improved neurological status:   Assess for and report changes in neurological status   Initiate measures to prevent increased intracranial pressure   Maintain blood pressure and fluid volume within ordered parameters to optimize cerebral perfusion and minimize risk of hemorrhage   Monitor temperature, glucose, and sodium. Initiate appropriate interventions as ordered     Problem: Neurosensory - Adult  Goal: Absence of seizures  Outcome: Progressing  Flowsheets (Taken 11/16/2023 2058 by Kattie Sicard, RN)  Absence of seizures:   Monitor for seizure activity.   If seizure occurs, document type and location of movements and any associated apnea   Administer anticonvulsants as ordered   Diagnostic studies as ordered   If seizure occurs, turn head to side and suction secretions as needed   Support airway/breathing, administer oxygen as needed     Problem: Skin/Tissue Integrity - Adult  Goal: Incisions, wounds, or drain sites healing without S/S of infection  Outcome: Progressing  Flowsheets (Taken 11/16/2023 2058 by Kattie Sicard, RN)  Incisions, Wounds, or Drain Sites Healing Without Sign and Symptoms of Infection: ADMISSION and DAILY: Assess and document risk factors for pressure ulcer development     Problem: Gastrointestinal - Adult  Goal: Maintains adequate nutritional intake  Outcome: Progressing  Flowsheets (Taken 11/16/2023 2058 by Kattie Sicard, RN)  Maintains adequate nutritional intake:   Obtain nutritional consult as needed   Monitor intake and output, weight and lab values     Problem: Genitourinary - Adult  Goal: Urinary catheter remains patent  Outcome: Progressing  Flowsheets (Taken 11/16/2023 2058 by Kattie Sicard, RN)  Urinary catheter remains patent:   Assess patency of urinary catheter   Irrigate catheter per Licensed

## 2023-11-19 NOTE — PROGRESS NOTES
Department of Otolaryngology  Progress Note     Chief Complaint:  POD 38 s/p cricotracheal resection, laryngoplasty with flap and vocal cord lateralization stitch placement     SUBJECTIVE: No events overnight. Continues on SBT on ventilator and tolerating since 11/16. No sedatives. Hemodynamically stable. No changes with neurological status. No family present at this time. REVIEW OF SYSTEMS:    Unable to perform ROS: intubated     OBJECTIVE       Physical  Vitals:    11/19/23 1322   BP:    Pulse: 67   Resp: 17   Temp:    SpO2: 100%      Chronically ill older adult female, orally intubated on mechanical ventilation. Gently touched left hand and spoke her name, immediately opened her eyes but closed quickly; no eye contact or tracking. No other response after that. ETT in place at 22cm at lip. No blood in oropharynx. Anterior neck incision is well healed.       Data  CBC:         Lab Results   Component Value Date/Time     WBC 8.0 11/17/2023 03:45 AM     RBC 2.92 11/17/2023 03:45 AM     RBC 4.17 10/10/2023 08:45 AM     HGB 7.8 11/17/2023 10:20 AM     HCT 24.5 11/17/2023 10:20 AM     MCV 94.5 11/17/2023 03:45 AM     MCH 30.1 11/17/2023 03:45 AM     MCHC 31.9 11/17/2023 03:45 AM     RDW 13.1 10/10/2023 08:45 AM      11/17/2023 03:45 AM     MPV 10.0 11/17/2023 03:45 AM      BMP:          Lab Results   Component Value Date/Time      11/17/2023 03:45 AM     K 4.1 11/17/2023 03:45 AM     K 3.4 10/20/2023 02:45 PM     CL 96 11/17/2023 03:45 AM     CO2 30 11/17/2023 03:45 AM     BUN 41 11/17/2023 03:45 AM     LABALBU 3.1 11/02/2023 03:50 AM     CREATININE 0.7 11/17/2023 03:45 AM     CALCIUM 9.6 11/17/2023 03:45 AM     LABGLOM >60 11/17/2023 03:45 AM     GLUCOSE 139 11/17/2023 03:45 AM     GLUCOSE 171 10/10/2023 08:45 AM         Inpatient Medications    Current Hospital Medications   Current Facility-Administered Medications: bumetanide (BUMEX) tablet 1 mg, 1 mg, Oral, BID  norepinephrine (LEVOPHED)

## 2023-11-20 LAB
ALBUMIN SERPL BCG-MCNC: 3.5 G/DL (ref 3.5–5.1)
ALP SERPL-CCNC: 150 U/L (ref 38–126)
ALT SERPL W/O P-5'-P-CCNC: 26 U/L (ref 11–66)
ANION GAP SERPL CALC-SCNC: 9 MEQ/L (ref 8–16)
AST SERPL-CCNC: 33 U/L (ref 5–40)
BASOPHILS ABSOLUTE: 0 THOU/MM3 (ref 0–0.1)
BASOPHILS NFR BLD AUTO: 0.4 %
BILIRUB SERPL-MCNC: 0.3 MG/DL (ref 0.3–1.2)
BUN SERPL-MCNC: 41 MG/DL (ref 7–22)
CALCIUM SERPL-MCNC: 9.2 MG/DL (ref 8.5–10.5)
CHLORIDE SERPL-SCNC: 99 MEQ/L (ref 98–111)
CO2 SERPL-SCNC: 31 MEQ/L (ref 23–33)
CREAT SERPL-MCNC: 0.5 MG/DL (ref 0.4–1.2)
DEPRECATED RDW RBC AUTO: 54.1 FL (ref 35–45)
EOSINOPHIL NFR BLD AUTO: 2.5 %
EOSINOPHILS ABSOLUTE: 0.2 THOU/MM3 (ref 0–0.4)
ERYTHROCYTE [DISTWIDTH] IN BLOOD BY AUTOMATED COUNT: 16.3 % (ref 11.5–14.5)
GFR SERPL CREATININE-BSD FRML MDRD: > 60 ML/MIN/1.73M2
GLUCOSE BLD STRIP.AUTO-MCNC: 133 MG/DL (ref 70–108)
GLUCOSE BLD STRIP.AUTO-MCNC: 166 MG/DL (ref 70–108)
GLUCOSE BLD STRIP.AUTO-MCNC: 167 MG/DL (ref 70–108)
GLUCOSE BLD STRIP.AUTO-MCNC: 168 MG/DL (ref 70–108)
GLUCOSE BLD STRIP.AUTO-MCNC: 176 MG/DL (ref 70–108)
GLUCOSE SERPL-MCNC: 134 MG/DL (ref 70–108)
HCT VFR BLD AUTO: 30.6 % (ref 37–47)
HGB BLD-MCNC: 9.8 GM/DL (ref 12–16)
IMM GRANULOCYTES # BLD AUTO: 0.05 THOU/MM3 (ref 0–0.07)
IMM GRANULOCYTES NFR BLD AUTO: 0.7 %
LYMPHOCYTES ABSOLUTE: 1.7 THOU/MM3 (ref 1–4.8)
LYMPHOCYTES NFR BLD AUTO: 25 %
MAGNESIUM SERPL-MCNC: 1.9 MG/DL (ref 1.6–2.4)
MCH RBC QN AUTO: 29 PG (ref 26–33)
MCHC RBC AUTO-ENTMCNC: 32 GM/DL (ref 32.2–35.5)
MCV RBC AUTO: 90.5 FL (ref 81–99)
MONOCYTES ABSOLUTE: 0.6 THOU/MM3 (ref 0.4–1.3)
MONOCYTES NFR BLD AUTO: 8.1 %
NEUTROPHILS NFR BLD AUTO: 63.3 %
NRBC BLD AUTO-RTO: 0 /100 WBC
PHOSPHATE SERPL-MCNC: 3.8 MG/DL (ref 2.4–4.7)
PLATELET # BLD AUTO: 251 THOU/MM3 (ref 130–400)
PMV BLD AUTO: 10 FL (ref 9.4–12.4)
POTASSIUM SERPL-SCNC: 4 MEQ/L (ref 3.5–5.2)
PROT SERPL-MCNC: 5.7 G/DL (ref 6.1–8)
RBC # BLD AUTO: 3.38 MILL/MM3 (ref 4.2–5.4)
SEGMENTED NEUTROPHILS ABSOLUTE COUNT: 4.4 THOU/MM3 (ref 1.8–7.7)
SODIUM SERPL-SCNC: 139 MEQ/L (ref 135–145)
WBC # BLD AUTO: 6.9 THOU/MM3 (ref 4.8–10.8)

## 2023-11-20 PROCEDURE — 6370000000 HC RX 637 (ALT 250 FOR IP): Performed by: NURSE PRACTITIONER

## 2023-11-20 PROCEDURE — 84100 ASSAY OF PHOSPHORUS: CPT

## 2023-11-20 PROCEDURE — 85025 COMPLETE CBC W/AUTO DIFF WBC: CPT

## 2023-11-20 PROCEDURE — 83735 ASSAY OF MAGNESIUM: CPT

## 2023-11-20 PROCEDURE — 2700000000 HC OXYGEN THERAPY PER DAY

## 2023-11-20 PROCEDURE — 80053 COMPREHEN METABOLIC PANEL: CPT

## 2023-11-20 PROCEDURE — 6360000002 HC RX W HCPCS: Performed by: INTERNAL MEDICINE

## 2023-11-20 PROCEDURE — 2580000003 HC RX 258: Performed by: NURSE PRACTITIONER

## 2023-11-20 PROCEDURE — 2500000003 HC RX 250 WO HCPCS: Performed by: NURSE PRACTITIONER

## 2023-11-20 PROCEDURE — 94761 N-INVAS EAR/PLS OXIMETRY MLT: CPT

## 2023-11-20 PROCEDURE — 6360000002 HC RX W HCPCS: Performed by: NURSE PRACTITIONER

## 2023-11-20 PROCEDURE — 82948 REAGENT STRIP/BLOOD GLUCOSE: CPT

## 2023-11-20 PROCEDURE — 99024 POSTOP FOLLOW-UP VISIT: CPT | Performed by: PHYSICIAN ASSISTANT

## 2023-11-20 PROCEDURE — 6370000000 HC RX 637 (ALT 250 FOR IP): Performed by: INTERNAL MEDICINE

## 2023-11-20 PROCEDURE — 36415 COLL VENOUS BLD VENIPUNCTURE: CPT

## 2023-11-20 PROCEDURE — 99291 CRITICAL CARE FIRST HOUR: CPT | Performed by: INTERNAL MEDICINE

## 2023-11-20 PROCEDURE — 94003 VENT MGMT INPAT SUBQ DAY: CPT

## 2023-11-20 PROCEDURE — 2000000000 HC ICU R&B

## 2023-11-20 RX ORDER — LEVETIRACETAM 500 MG/1
500 TABLET ORAL 2 TIMES DAILY
Status: DISCONTINUED | OUTPATIENT
Start: 2023-11-20 | End: 2023-11-28

## 2023-11-20 RX ADMIN — LEVETIRACETAM 500 MG: 500 TABLET, FILM COATED ORAL at 20:28

## 2023-11-20 RX ADMIN — FOLIC ACID 1 MG: 5 INJECTION, SOLUTION INTRAMUSCULAR; INTRAVENOUS; SUBCUTANEOUS at 08:48

## 2023-11-20 RX ADMIN — MICONAZOLE NITRATE: 2 POWDER TOPICAL at 20:30

## 2023-11-20 RX ADMIN — MODAFINIL 200 MG: 100 TABLET ORAL at 08:41

## 2023-11-20 RX ADMIN — ENOXAPARIN SODIUM 90 MG: 100 INJECTION SUBCUTANEOUS at 21:38

## 2023-11-20 RX ADMIN — CHLORHEXIDINE GLUCONATE 0.12% ORAL RINSE 15 ML: 1.2 LIQUID ORAL at 20:30

## 2023-11-20 RX ADMIN — BUMETANIDE 1 MG: 1 TABLET ORAL at 20:28

## 2023-11-20 RX ADMIN — FAMOTIDINE 20 MG: 20 TABLET ORAL at 20:29

## 2023-11-20 RX ADMIN — SODIUM CHLORIDE, PRESERVATIVE FREE 10 ML: 5 INJECTION INTRAVENOUS at 08:42

## 2023-11-20 RX ADMIN — LEVETIRACETAM 500 MG: 100 INJECTION, SOLUTION INTRAVENOUS at 06:50

## 2023-11-20 RX ADMIN — BUMETANIDE 1 MG: 1 TABLET ORAL at 08:45

## 2023-11-20 RX ADMIN — CHLORHEXIDINE GLUCONATE 0.12% ORAL RINSE 15 ML: 1.2 LIQUID ORAL at 08:41

## 2023-11-20 RX ADMIN — INSULIN GLARGINE 30 UNITS: 100 INJECTION, SOLUTION SUBCUTANEOUS at 20:29

## 2023-11-20 RX ADMIN — MICONAZOLE NITRATE: 2 POWDER TOPICAL at 08:41

## 2023-11-20 RX ADMIN — SODIUM CHLORIDE, PRESERVATIVE FREE 10 ML: 5 INJECTION INTRAVENOUS at 08:47

## 2023-11-20 RX ADMIN — FAMOTIDINE 20 MG: 20 TABLET ORAL at 08:41

## 2023-11-20 ASSESSMENT — PULMONARY FUNCTION TESTS
PIF_VALUE: 15

## 2023-11-20 NOTE — PLAN OF CARE
Problem: Discharge Planning  Goal: Discharge to home or other facility with appropriate resources  Outcome: Progressing  Flowsheets (Taken 11/20/2023 0235)  Discharge to home or other facility with appropriate resources: Identify barriers to discharge with patient and caregiver     Problem: Chronic Conditions and Co-morbidities  Goal: Patient's chronic conditions and co-morbidity symptoms are monitored and maintained or improved  Outcome: Progressing  Flowsheets (Taken 11/20/2023 0235)  Care Plan - Patient's Chronic Conditions and Co-Morbidity Symptoms are Monitored and Maintained or Improved:   Monitor and assess patient's chronic conditions and comorbid symptoms for stability, deterioration, or improvement   Collaborate with multidisciplinary team to address chronic and comorbid conditions and prevent exacerbation or deterioration   Update acute care plan with appropriate goals if chronic or comorbid symptoms are exacerbated and prevent overall improvement and discharge     Problem: Safety - Adult  Goal: Free from fall injury  Outcome: Progressing  Flowsheets (Taken 11/20/2023 0235)  Free From Fall Injury: Instruct family/caregiver on patient safety     Problem: Pain  Goal: Verbalizes/displays adequate comfort level or baseline comfort level  Outcome: Progressing  Flowsheets (Taken 11/20/2023 0235)  Verbalizes/displays adequate comfort level or baseline comfort level: Assess pain using appropriate pain scale     Problem: Respiratory - Adult  Goal: Achieves optimal ventilation and oxygenation  11/20/2023 0235 by Mahesh Morton RN  Outcome: Progressing  Flowsheets (Taken 11/20/2023 0235)  Achieves optimal ventilation and oxygenation:   Assess for changes in respiratory status   Assess for changes in mentation and behavior     Problem: Nutrition Deficit:  Goal: Optimize nutritional status  Outcome: Progressing  Flowsheets (Taken 11/20/2023 0235)  Nutrient intake appropriate for improving, restoring, or maintaining

## 2023-11-20 NOTE — PROGRESS NOTES
Comprehensive Nutrition Assessment    Type and Reason for Visit:  Reassess (Tube Feed Monitor)    Nutrition Recommendations/Plan:   Per Dr. Alta Bee change TF to continuous gtt Glucerna 1.5 40ml/hour  Free H20 flush of 200ml every 4h  Recommend scheduled bowel meds if needed - at one point this admit pt. Went 12 days without a BM     Malnutrition Assessment:  Malnutrition Status: At risk for malnutrition (Comment) (11/20/23 7139)    Context:  Chronic Illness     Findings of the 6 clinical characteristics of malnutrition:  Energy Intake:  No significant decrease in energy intake (pt. tolerating TF since admit)  Weight Loss:  Greater than 10% over 6 months (31# or 16% in 5 months)     Body Fat Loss:  No significant body fat loss     Muscle Mass Loss:  No significant muscle mass loss Temples (temporalis), Clavicles (pectoralis & deltoids)  Fluid Accumulation:  Mild     Strength:  Not Performed    Nutrition Assessment:     Pt. improving from a nutritional standpoint AEB tolerating TF at goal while NPO as intubated. 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       Nutrition Related Findings:    Pt. Report/Treatments/Miscellaneous: pt. Seen this am; spoke with RN; changing TF today as per Dr. Alta Bee; persistent severe encephalopathy; free H20 increased to get off IVFs; intubated; UO 1710ml; was tolerating bolus TF; if ECF is planned at discharge Glucerna is a better longterm nutrition support option   GI Status: BM x1 11/19  Pertinent Labs: glucose 167  BUN 41  creatinine 0.5  Na 139  K+ 4  phosphorus 3.8    Pertinent Meds: bumex, folic acid, lantus, humalog, keppra    Wound Type: Pressure Injury, Stage II (coccyx; Diagnosis:   Inadequate oral intake related to impaired respiratory function as evidenced by NPO or clear liquid status due to medical condition, intubation, nutrition support - enteral nutrition    Nutrition Interventions:   Food and/or Nutrient Delivery: Continue NPO, Modify Tube Feeding  Nutrition Education/Counseling: No recommendation at this time  Coordination of Nutrition Care: Continue to monitor while inpatient, Interdisciplinary Rounds       Goals:  Previous Goal Met: Progressing toward Goal(s)  Goals: Tolerate nutrition support at goal rate, by next RD assessment       Nutrition Monitoring and Evaluation:      Food/Nutrient Intake Outcomes: Enteral Nutrition Intake/Tolerance  Physical Signs/Symptoms Outcomes: Biochemical Data, Chewing or Swallowing, GI Status, Fluid Status or Edema, Hemodynamic Status, Nutrition Focused Physical Findings, Skin, Weight    Discharge Planning:     Too soon to determine     Heriberto Trotter RD, LD  Contact: [phone]

## 2023-11-20 NOTE — PLAN OF CARE
Problem: Respiratory - Adult  Goal: Achieves optimal ventilation and oxygenation  11/20/2023 0451 by Meghana Hendrix RCP  Outcome: Progressing  Note: Pt on SBT since 11/17 0555. Patient Weaning Progress     Spontaneous weaning trial is continued. Patient has been on SBT since 11/17 0555. Evac tube was  hooked up with continuous low suction(20-30mmHg)       Cuff was not deflated to determine cuff leak. Unable to get agreement for goals because no family is present and patient cannot respond.

## 2023-11-20 NOTE — CONSULTS
Clinical Ethics Consultation Note    History and Context:    Age: 81 y.o    Gender: female    Gender Identity: female   Admitted Date: 10/172022    Consult Date: 11/20/23   MRN: [id number]    Valid Advanced Medical Directive:  no    Ethical Question(s) and Concern(s):    Goals of care alignment between medical team and patient's legal surrogate. Analysis:  Intended to connect with family, however family was not present. Dr. Nimo Perez shared with me that patient's condition has changed from where it was two weeks ago, and also said that  based on his conversation with family, they are not yet ready to make a solid decision on what is next for patient. Dr. Nimo Perez said he will evaluate patient's condition again in two weeks and wait for the family to be at ease with next steps. . Patient is a full code. Recommendations:  Recommend that an ethics consultant be present at the time of the next family meeting  to provide support and offer recommendations as needed. David Garcia.  Anna Carbone, [facility]

## 2023-11-21 ENCOUNTER — APPOINTMENT (OUTPATIENT)
Dept: GENERAL RADIOLOGY | Age: 73
DRG: 163 | End: 2023-11-21
Attending: OTOLARYNGOLOGY
Payer: MEDICARE

## 2023-11-21 LAB
GLUCOSE BLD STRIP.AUTO-MCNC: 112 MG/DL (ref 70–108)
GLUCOSE BLD STRIP.AUTO-MCNC: 125 MG/DL (ref 70–108)
GLUCOSE BLD STRIP.AUTO-MCNC: 126 MG/DL (ref 70–108)
GLUCOSE BLD STRIP.AUTO-MCNC: 153 MG/DL (ref 70–108)
GLUCOSE BLD STRIP.AUTO-MCNC: 154 MG/DL (ref 70–108)
GLUCOSE BLD STRIP.AUTO-MCNC: 169 MG/DL (ref 70–108)

## 2023-11-21 PROCEDURE — 2500000003 HC RX 250 WO HCPCS: Performed by: NURSE PRACTITIONER

## 2023-11-21 PROCEDURE — 6370000000 HC RX 637 (ALT 250 FOR IP): Performed by: NURSE PRACTITIONER

## 2023-11-21 PROCEDURE — 2580000003 HC RX 258: Performed by: NURSE PRACTITIONER

## 2023-11-21 PROCEDURE — 2000000000 HC ICU R&B

## 2023-11-21 PROCEDURE — 71045 X-RAY EXAM CHEST 1 VIEW: CPT

## 2023-11-21 PROCEDURE — 6360000002 HC RX W HCPCS: Performed by: INTERNAL MEDICINE

## 2023-11-21 PROCEDURE — 6370000000 HC RX 637 (ALT 250 FOR IP): Performed by: INTERNAL MEDICINE

## 2023-11-21 PROCEDURE — 94761 N-INVAS EAR/PLS OXIMETRY MLT: CPT

## 2023-11-21 PROCEDURE — 2700000000 HC OXYGEN THERAPY PER DAY

## 2023-11-21 PROCEDURE — 94003 VENT MGMT INPAT SUBQ DAY: CPT

## 2023-11-21 PROCEDURE — 99024 POSTOP FOLLOW-UP VISIT: CPT | Performed by: PHYSICIAN ASSISTANT

## 2023-11-21 PROCEDURE — 99291 CRITICAL CARE FIRST HOUR: CPT | Performed by: INTERNAL MEDICINE

## 2023-11-21 PROCEDURE — 82948 REAGENT STRIP/BLOOD GLUCOSE: CPT

## 2023-11-21 RX ADMIN — BUMETANIDE 1 MG: 1 TABLET ORAL at 10:33

## 2023-11-21 RX ADMIN — ENOXAPARIN SODIUM 90 MG: 100 INJECTION SUBCUTANEOUS at 20:16

## 2023-11-21 RX ADMIN — INSULIN GLARGINE 30 UNITS: 100 INJECTION, SOLUTION SUBCUTANEOUS at 20:16

## 2023-11-21 RX ADMIN — MICONAZOLE NITRATE: 2 POWDER TOPICAL at 20:13

## 2023-11-21 RX ADMIN — LEVETIRACETAM 500 MG: 500 TABLET, FILM COATED ORAL at 20:12

## 2023-11-21 RX ADMIN — MODAFINIL 200 MG: 100 TABLET ORAL at 10:41

## 2023-11-21 RX ADMIN — BUMETANIDE 1 MG: 1 TABLET ORAL at 20:12

## 2023-11-21 RX ADMIN — SODIUM CHLORIDE, PRESERVATIVE FREE 10 ML: 5 INJECTION INTRAVENOUS at 10:36

## 2023-11-21 RX ADMIN — MICONAZOLE NITRATE: 2 POWDER TOPICAL at 10:00

## 2023-11-21 RX ADMIN — SODIUM CHLORIDE, PRESERVATIVE FREE 10 ML: 5 INJECTION INTRAVENOUS at 20:12

## 2023-11-21 RX ADMIN — FOLIC ACID 1 MG: 5 INJECTION, SOLUTION INTRAMUSCULAR; INTRAVENOUS; SUBCUTANEOUS at 10:50

## 2023-11-21 RX ADMIN — ENOXAPARIN SODIUM 90 MG: 100 INJECTION SUBCUTANEOUS at 10:42

## 2023-11-21 RX ADMIN — CHLORHEXIDINE GLUCONATE 0.12% ORAL RINSE 15 ML: 1.2 LIQUID ORAL at 10:00

## 2023-11-21 RX ADMIN — FAMOTIDINE 20 MG: 20 TABLET ORAL at 20:17

## 2023-11-21 RX ADMIN — CHLORHEXIDINE GLUCONATE 0.12% ORAL RINSE 15 ML: 1.2 LIQUID ORAL at 20:11

## 2023-11-21 RX ADMIN — LEVETIRACETAM 500 MG: 500 TABLET, FILM COATED ORAL at 10:32

## 2023-11-21 RX ADMIN — SODIUM CHLORIDE, PRESERVATIVE FREE 10 ML: 5 INJECTION INTRAVENOUS at 10:35

## 2023-11-21 RX ADMIN — FAMOTIDINE 20 MG: 20 TABLET ORAL at 10:41

## 2023-11-21 ASSESSMENT — PULMONARY FUNCTION TESTS
PIF_VALUE: 14
PIF_VALUE: 13
PIF_VALUE: 15

## 2023-11-21 NOTE — PROGRESS NOTES
Patient:  [facility]    Unit/Bed:4D-08/008-A  MRN: [id number]   PCP: Arlice Ormond, MD  Date of Admission: 10/12/2023    Assessment and Plan(All pulmonary edema, renal failure, PE, and respiratory failure diagnoses are acute in nature unless otherwise specified): Anoxic Brain Injury: As of 11/20/23, the highest GCS obtained is 8. Mostly stays at GCS 5. Patient continues with ventilator support to allow time to determine extent of recovery. Injury is severe with LOC at persistent vegetative state. Multiple EEGs and continuous EEG show no seizure activity. EEG is consistent with severe encephalopathy. MRI and CT head show no structural injury. Patient did not qualify for hypothermia therapy as she had respiratory arrest leading to cardiac arrest.  There is no data to show recovery benefit in this population. She did receive hypertonic saline to minimize risk of cerebral edema keeping serum sodium 145-150. Patient on Provigil for neuro-stimulation. Peak level occurred 11/10/23. Keppra dosing decreased 11/13/23. If no seizure activity by 11/28/23, plan to cut dose to 250 mg bid. Look to discontinue Keppra if no seizures for 2 weeks after that. EEG completed 11/16/23 shows ongoing cortical dysfunction. Seems to have reached plateau of neurologic recovery. Assess for any improvement over the next 2 weeks. If no further recovery, will discuss parameters of care with family. Acute Respiratory Failure: Intubated with difficulty on 10/16/23 secondary to complete airway collapse with no capacity for air exchange. What air was in the lungs was trapped in the lungs. The amount of laryngeal edema was intense and required a stylette to penetrate the edema and secure the airway. Patient remains on mechanical ventilator for airway protection. Continue with ventilator support. Family reports patient would not want tracheostomy tube again-ever. No plans for extubation at this time.  My discussion with  on 11/17/23 indicates they would consider tracheostomy tube to extend time to recover. Post Cricotracheal resection with laryngoplasty: 10/12/23. Dr. Sowmya Rapp following. Debridement completed 11/16/23. Bilateral DVT: On heparin. Right DVT has resolved on venous doppler completed 11/1/23. Left DVTs persist.  On therapeutic Lovenox. DM II: On Lantus insulin with SSI. Diastolic Heart Failure: Echo 1/2/23 shows EF 60%. On diuretics. Carvedilol and amlodipine on hold. HTN: Currently on Bumex. Blood pressure marginal.  Anemia:Trending H/H. Edema:  Diuresis improved with Bumex Zaroxolyn combination. Improving edema. Nutrition: On TF. GI prophylaxis: Pepcid. Hypotension:  Resolved. Tongue biting:  Primarily left sided. Repeat 4 hour EEG on 11/16/23 showed no seizures. Resolved. CC:  Anoxic Brain Injury  HPI: Patient is a 81year old white female nonsmoker. She has a history of CAD with stenting in 2008. She has degenerative arthritis, diabetic neuropathy with bilateral feet numbness at baseline. She has type II DM, previous PE in January 2023, hyperlipidemia, and HTN. Patient has history of COVID-19 with associated respiratory failure requiring intubation and subsequent tracheosotmy tube placement. She developed tracheal stenosis. Repeated laryngoscopy with dilation did not sustain improvement. She was admitted to [facility] on 10/12/23 and underwnet cricotracheal resection with laryngoplasty. She failed post-operative extubation due to airway inflammation. She was maintained on mechanical ventilator until extubation trial 10/16. At extubation, she fully collapsed her airway trapping air in the chest.  She could not exchange respirations meaning she could not move air in or out of the airway. She developed respiratory arrest and subsequent cardiac arrest.  Ambu respirations could not penetrate the airway and filled the stomach as airway had acutely collapsed.   She was emergently eyes to light touch and occasionally spontaneously. No focus of pupils and no tracking. No DTR bilaterally. Persistent up going babinski. No clonus. Patient will yawn and cough spontaneously. Oculocephalic reflexes intact. No posturing to discomfort. Data: (All radiographs, tracings, PFTs, and imaging are personally viewed and interpreted unless otherwise noted). Venous Doppler report 11/1/23: Left axillary vein acute thrombus. Chronic DVT left subclavian vein. Telemetry shows sinus rhythm. EEG report 11/16/23:  Cortical dysfunction with severe encephalopathy. Glucose 126    CC time 35 minutes. Time was discontiguous and does not include procedures. Electronically signed by Allan Rosa M.D.

## 2023-11-21 NOTE — CARE COORDINATION
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 There is also nonocclusive thrombus within the left posterior tibial vein  10/16 Extubated to bipap  10/16 Code Blue  10/16 Reintubated  10/16 Bronch: thick bloody secretions in RLL removed  10/16 CXR: Mild stable cardiomegaly with pulmonary vascular congestion suspicious for congestive heart failure; Prominent pulmonary interstitium suspicious for pulmonary edema  10/17 5 hr EEG: No seizures noted; diffuse background attenuation and suppression without evidence of reactivity; severe encephalopathy  10/17 CT Head: Stable CT appearance the brain. No acute findings  10/17 MRI Brain: No acute findings  64/24 PICC left basilic  36/70 CT Head: No acute findings  10/18 OR: ET Exchange bronhoscopy and lavage  10/19 4hr 40 min EEG: This EEG was abnormal. The background abnormalities indicated a moderate to severe encephalopathy, nonspecific to etiology. The generalized periodic discharges (GPDs) indicated underlying cortical irritability/increased risk of seizures, and can be seen in various encephalopathies. No seizures were recorded  10/19 MRI Brain: No evidence of an acute infarct. No evidence of anoxia; Stable mild severity chronic small vessel ischemic changes; Global volume loss  10/23 4-day EEG: Moderate to severe encephalopathy, improved as recording progressed.  Frequent and at times near continuous runs of GPDs with often typical and at times atypical triphasic morphology, activated with stimulation at the beginning of recording, consistent with SIRPIDs   10/23 BUE Venous Dopplers: There is noncompressibility and absent flow, likely secondary to acute thrombus, in the left axillary, brachial, basilic and cephalic veins. There is partial flow and compressibility in the left subclavian vein. There is noncompressibility and absent flow in the right cephalic and antecubital veins. Remaining vein segments demonstrate normal compressibility and flow  11/2 BUE Venous dopplers: There is acute appearing DVT in the left axillary vein; Chronic appearing DVT is present in the distal left subclavian vein; Superficial thrombophlebitis involving the left basilic vein; Overall this examination has improved since the prior exam dated 10/23/202  11/16 EEG 40 min: abnormal due to disorganized and slow background. Occasional generalized periodic discharges with triphasic morphology. At times there were increased frequency of these discharges with activation consistent with SIRPIDs. No electrographic seizures on this recording  11/16 Suspension Laryngoscopy and Bronchoscopy, with Debridement and steroid injection  11/16 4.5 hr EEG: abnormal.  Diffuse low amplitude polymorphic delta slowing and occasional triphasic waves suggested severe encephalopathy. No abnormal epileptiform activity was seen  11/21 CXR: Cardiomegaly with mild pulmonary vascular congestion    Barriers to Discharge: Changed to continuous tube feeds. Remains on vent w/ETT on SBT, FIO2 40%, sats 94%. Tmax 99.2. NSR. Unable to follow commands, CORTEZ x4 to painful stim, decerebrate BUEs. Intensivist and ENT following. Dietitian and Wound Care following. Telemetry, PICC, NG w/TF, werner, SCDs. Levo @ 4 mcg/min, po bumex 1 mg bid, Peridex, lovenox, pepcid, IV folic acid, lantus, SSI, po keppra, desenex, provigil, Electrolyte replacement protocols.     PCP: Priya Helton MD  Readmission Risk Score: 20.1%  Patient Goals/Plan/Treatment Preferences: From home with , plans pending progress.

## 2023-11-21 NOTE — PLAN OF CARE
Problem: Respiratory - Adult  Goal: Normal spontaneous ventilation  11/21/2023 1409 by Adelle Saint, RCP  Outcome: Not Progressing                                               Patient Weaning Progress    The patient's vent settings was not able to be weaned this shift. Ventilator settings that were weaned              [] Mode   [] Pressure support weaned   [] Fio2 weaned   [] Peep weaned      Spontaneous weaning trial  was attempted. due to defined parameters for SBT (spontaneous breathing trial) not being met. *Results of SBT documented under SBTOUTCOME note. Reason that defined ventilator parameters for SBT was not met              [] Patient condition requires increased ventilator settings  [] Requires increased sedation   [] Settings not within weaning range   [] SAT not completed   [] Physician orders    The patient was able to tolerate SBT. RSBI  was 26 with EtCO2 of 26 and SpO2 of 96 on 25% FiO2. Spontanteous VT was 454 and RR 18 breaths/min. The patient was on SBT indefinitely      Evac tube was  hooked up with continuous low suction(20-30mmHg)      Cuff was not  deflated to determine cuff leak. Family mutually agreed on goals.

## 2023-11-21 NOTE — PLAN OF CARE
Problem: Discharge Planning  Goal: Discharge to home or other facility with appropriate resources  Outcome: Progressing  Flowsheets (Taken 11/20/2023 0235 by Lizette Grider RN)  Discharge to home or other facility with appropriate resources: Identify barriers to discharge with patient and caregiver     Problem: Chronic Conditions and Co-morbidities  Goal: Patient's chronic conditions and co-morbidity symptoms are monitored and maintained or improved  Outcome: Progressing  Flowsheets (Taken 11/20/2023 0235 by Lizette Grider RN)  Care Plan - Patient's Chronic Conditions and Co-Morbidity Symptoms are Monitored and Maintained or Improved:   Monitor and assess patient's chronic conditions and comorbid symptoms for stability, deterioration, or improvement   Collaborate with multidisciplinary team to address chronic and comorbid conditions and prevent exacerbation or deterioration   Update acute care plan with appropriate goals if chronic or comorbid symptoms are exacerbated and prevent overall improvement and discharge     Problem: Safety - Adult  Goal: Free from fall injury  Outcome: Progressing  Flowsheets (Taken 11/20/2023 0235 by Lizette Grider RN)  Free From Fall Injury: Instruct family/caregiver on patient safety     Problem: Pain  Goal: Verbalizes/displays adequate comfort level or baseline comfort level  Outcome: Progressing  Flowsheets (Taken 11/20/2023 0235 by Lizette Grider RN)  Verbalizes/displays adequate comfort level or baseline comfort level: Assess pain using appropriate pain scale     Problem: Respiratory - Adult  Goal: Achieves optimal ventilation and oxygenation  Outcome: Progressing  Flowsheets (Taken 11/20/2023 0235 by Lizette Grider RN)  Achieves optimal ventilation and oxygenation:   Assess for changes in respiratory status   Assess for changes in mentation and behavior     Problem: Nutrition Deficit:  Goal: Optimize nutritional status  11/20/2023 1858 by Acacia Villafana RN  Outcome: output and notify Licensed Independent Practitioner for values outside of normal range     Problem: ABCDS Injury Assessment  Goal: Absence of physical injury  Outcome: Progressing  Flowsheets (Taken 11/20/2023 0235 by Bull Schaefer RN)  Absence of Physical Injury: Implement safety measures based on patient assessment     Problem: Musculoskeletal - Adult  Goal: Maintain proper alignment of affected body part  Outcome: Progressing  Flowsheets (Taken 11/20/2023 0235 by Bull Schaefer RN)  Maintain proper alignment of affected body part: Support and protect limb and body alignment per provider's orders    Care plan reviewed with .  verbalize understanding of the plan of care and contribute to goal setting. Pt intubated and nonresponsive and unable to participate.

## 2023-11-21 NOTE — PLAN OF CARE
Problem: Discharge Planning  Goal: Discharge to home or other facility with appropriate resources  11/21/2023 0007 by Madhavi Dias RN  Outcome: Progressing  Flowsheets (Taken 11/21/2023 0007)  Discharge to home or other facility with appropriate resources: Identify barriers to discharge with patient and caregiver    Problem: Chronic Conditions and Co-morbidities  Goal: Patient's chronic conditions and co-morbidity symptoms are monitored and maintained or improved  11/21/2023 0007 by Madhavi Dias RN  Outcome: Progressing  Flowsheets (Taken 11/21/2023 0007)  Care Plan - Patient's Chronic Conditions and Co-Morbidity Symptoms are Monitored and Maintained or Improved:   Monitor and assess patient's chronic conditions and comorbid symptoms for stability, deterioration, or improvement   Update acute care plan with appropriate goals if chronic or comorbid symptoms are exacerbated and prevent overall improvement and discharge   Collaborate with multidisciplinary team to address chronic and comorbid conditions and prevent exacerbation or deterioration    Problem: Safety - Adult  Goal: Free from fall injury  11/21/2023 0007 by Madhavi Dias RN  Outcome: Progressing  Flowsheets (Taken 11/21/2023 0007)  Free From Fall Injury: Instruct family/caregiver on patient safety       Problem: Pain  Goal: Verbalizes/displays adequate comfort level or baseline comfort level  11/21/2023 0007 by Madhavi Dias RN  Outcome: Progressing  Flowsheets (Taken 11/21/2023 0007)  Verbalizes/displays adequate comfort level or baseline comfort level: Assess pain using appropriate pain scale    Problem: Respiratory - Adult  Goal: Achieves optimal ventilation and oxygenation  11/21/2023 0007 by Madhavi Dias RN  Outcome: Progressing  Flowsheets (Taken 11/21/2023 0007)  Achieves optimal ventilation and oxygenation: Assess for changes in respiratory status       Problem: Nutrition Deficit:  Goal: Optimize nutritional status  11/21/2023 0007 by Gricelda President, Eufemia Pearce RN  Outcome: Progressing  Flowsheets (Taken 11/21/2023 0007)  Nutrient intake appropriate for improving, restoring, or maintaining nutritional needs: Assess nutritional status and recommend course of action    Problem: Skin/Tissue Integrity  Goal: Absence of new skin breakdown  Description: 1. Monitor for areas of redness and/or skin breakdown  2. Assess vascular access sites hourly  3. Every 4-6 hours minimum:  Change oxygen saturation probe site  4. Every 4-6 hours:  If on nasal continuous positive airway pressure, respiratory therapy assess nares and determine need for appliance change or resting period. 11/21/2023 0007 by Awilda Gonzalez RN  Outcome: Progressing  Note: Patient remains free of new skin breakdown this shift.         Problem: Neurosensory - Adult  Goal: Achieves stable or improved neurological status  11/21/2023 0007 by Awilda Gonzalez RN  Outcome: Progressing  Flowsheets (Taken 11/21/2023 0007)  Achieves stable or improved neurological status:   Assess for and report changes in neurological status   Initiate measures to prevent increased intracranial pressure    Problem: Skin/Tissue Integrity - Adult  Goal: Incisions, wounds, or drain sites healing without S/S of infection  11/21/2023 0007 by Awilda Gonzalez RN  Outcome: Progressing  Flowsheets (Taken 11/21/2023 0007)  Incisions, Wounds, or Drain Sites Healing Without Sign and Symptoms of Infection: ADMISSION and DAILY: Assess and document risk factors for pressure ulcer development    Problem: Gastrointestinal - Adult  Goal: Maintains adequate nutritional intake  11/21/2023 0007 by Awilda Gonzalez RN  Outcome: Progressing  Flowsheets (Taken 11/21/2023 0007)  Maintains adequate nutritional intake:   Monitor percentage of each meal consumed   Monitor intake and output, weight and lab values    Problem: Genitourinary - Adult  Goal: Urinary catheter remains patent  11/21/2023 0007 by Awilda Gonzalez RN  Outcome: Progressing  Flowsheets (Taken

## 2023-11-22 LAB
ANION GAP SERPL CALC-SCNC: 7 MEQ/L (ref 8–16)
BASOPHILS ABSOLUTE: 0 THOU/MM3 (ref 0–0.1)
BASOPHILS NFR BLD AUTO: 0.4 %
BUN SERPL-MCNC: 35 MG/DL (ref 7–22)
CALCIUM SERPL-MCNC: 9.4 MG/DL (ref 8.5–10.5)
CHLORIDE SERPL-SCNC: 97 MEQ/L (ref 98–111)
CO2 SERPL-SCNC: 32 MEQ/L (ref 23–33)
CREAT SERPL-MCNC: 0.6 MG/DL (ref 0.4–1.2)
DEPRECATED RDW RBC AUTO: 52.9 FL (ref 35–45)
EOSINOPHIL NFR BLD AUTO: 2 %
EOSINOPHILS ABSOLUTE: 0.1 THOU/MM3 (ref 0–0.4)
ERYTHROCYTE [DISTWIDTH] IN BLOOD BY AUTOMATED COUNT: 15.5 % (ref 11.5–14.5)
GFR SERPL CREATININE-BSD FRML MDRD: > 60 ML/MIN/1.73M2
GLUCOSE BLD STRIP.AUTO-MCNC: 121 MG/DL (ref 70–108)
GLUCOSE BLD STRIP.AUTO-MCNC: 130 MG/DL (ref 70–108)
GLUCOSE BLD STRIP.AUTO-MCNC: 139 MG/DL (ref 70–108)
GLUCOSE BLD STRIP.AUTO-MCNC: 160 MG/DL (ref 70–108)
GLUCOSE SERPL-MCNC: 122 MG/DL (ref 70–108)
HCT VFR BLD AUTO: 34.1 % (ref 37–47)
HGB BLD-MCNC: 10.9 GM/DL (ref 12–16)
IMM GRANULOCYTES # BLD AUTO: 0.04 THOU/MM3 (ref 0–0.07)
IMM GRANULOCYTES NFR BLD AUTO: 0.5 %
LYMPHOCYTES ABSOLUTE: 1.8 THOU/MM3 (ref 1–4.8)
LYMPHOCYTES NFR BLD AUTO: 24.4 %
MCH RBC QN AUTO: 29.5 PG (ref 26–33)
MCHC RBC AUTO-ENTMCNC: 32 GM/DL (ref 32.2–35.5)
MCV RBC AUTO: 92.2 FL (ref 81–99)
MONOCYTES ABSOLUTE: 0.5 THOU/MM3 (ref 0.4–1.3)
MONOCYTES NFR BLD AUTO: 7.3 %
NEUTROPHILS NFR BLD AUTO: 65.4 %
NRBC BLD AUTO-RTO: 0 /100 WBC
PLATELET # BLD AUTO: 259 THOU/MM3 (ref 130–400)
PMV BLD AUTO: 9.7 FL (ref 9.4–12.4)
POTASSIUM SERPL-SCNC: 4.1 MEQ/L (ref 3.5–5.2)
RBC # BLD AUTO: 3.7 MILL/MM3 (ref 4.2–5.4)
SEGMENTED NEUTROPHILS ABSOLUTE COUNT: 4.8 THOU/MM3 (ref 1.8–7.7)
SODIUM SERPL-SCNC: 136 MEQ/L (ref 135–145)
WBC # BLD AUTO: 7.4 THOU/MM3 (ref 4.8–10.8)

## 2023-11-22 PROCEDURE — 6370000000 HC RX 637 (ALT 250 FOR IP): Performed by: INTERNAL MEDICINE

## 2023-11-22 PROCEDURE — 6370000000 HC RX 637 (ALT 250 FOR IP): Performed by: NURSE PRACTITIONER

## 2023-11-22 PROCEDURE — 2580000003 HC RX 258: Performed by: NURSE PRACTITIONER

## 2023-11-22 PROCEDURE — 2700000000 HC OXYGEN THERAPY PER DAY

## 2023-11-22 PROCEDURE — 82948 REAGENT STRIP/BLOOD GLUCOSE: CPT

## 2023-11-22 PROCEDURE — 2000000000 HC ICU R&B

## 2023-11-22 PROCEDURE — 99291 CRITICAL CARE FIRST HOUR: CPT | Performed by: INTERNAL MEDICINE

## 2023-11-22 PROCEDURE — 2500000003 HC RX 250 WO HCPCS: Performed by: NURSE PRACTITIONER

## 2023-11-22 PROCEDURE — 94761 N-INVAS EAR/PLS OXIMETRY MLT: CPT

## 2023-11-22 PROCEDURE — 80048 BASIC METABOLIC PNL TOTAL CA: CPT

## 2023-11-22 PROCEDURE — 6360000002 HC RX W HCPCS: Performed by: INTERNAL MEDICINE

## 2023-11-22 PROCEDURE — 94003 VENT MGMT INPAT SUBQ DAY: CPT

## 2023-11-22 PROCEDURE — 36415 COLL VENOUS BLD VENIPUNCTURE: CPT

## 2023-11-22 PROCEDURE — 99024 POSTOP FOLLOW-UP VISIT: CPT | Performed by: PHYSICIAN ASSISTANT

## 2023-11-22 PROCEDURE — 85025 COMPLETE CBC W/AUTO DIFF WBC: CPT

## 2023-11-22 RX ORDER — NOREPINEPHRINE BITARTRATE 0.06 MG/ML
1-100 INJECTION, SOLUTION INTRAVENOUS CONTINUOUS
Status: DISCONTINUED | OUTPATIENT
Start: 2023-11-22 | End: 2023-11-27

## 2023-11-22 RX ORDER — FOLIC ACID 1 MG/1
1 TABLET ORAL DAILY
Status: DISPENSED | OUTPATIENT
Start: 2023-11-23

## 2023-11-22 RX ORDER — ENOXAPARIN SODIUM 100 MG/ML
1 INJECTION SUBCUTANEOUS EVERY 12 HOURS
Status: DISPENSED | OUTPATIENT
Start: 2023-11-22

## 2023-11-22 RX ORDER — BUMETANIDE 0.5 MG/1
0.5 TABLET ORAL 3 TIMES DAILY
Status: DISCONTINUED | OUTPATIENT
Start: 2023-11-22 | End: 2023-11-23

## 2023-11-22 RX ADMIN — INSULIN GLARGINE 30 UNITS: 100 INJECTION, SOLUTION SUBCUTANEOUS at 20:31

## 2023-11-22 RX ADMIN — SODIUM CHLORIDE, PRESERVATIVE FREE 10 ML: 5 INJECTION INTRAVENOUS at 08:00

## 2023-11-22 RX ADMIN — FAMOTIDINE 20 MG: 20 TABLET ORAL at 20:31

## 2023-11-22 RX ADMIN — LEVETIRACETAM 500 MG: 500 TABLET, FILM COATED ORAL at 21:19

## 2023-11-22 RX ADMIN — SODIUM CHLORIDE, PRESERVATIVE FREE 10 ML: 5 INJECTION INTRAVENOUS at 20:31

## 2023-11-22 RX ADMIN — ENOXAPARIN SODIUM 80 MG: 100 INJECTION SUBCUTANEOUS at 20:31

## 2023-11-22 RX ADMIN — BUMETANIDE 0.5 MG: 0.5 TABLET ORAL at 20:32

## 2023-11-22 RX ADMIN — MICONAZOLE NITRATE: 2 POWDER TOPICAL at 20:32

## 2023-11-22 RX ADMIN — BUMETANIDE 0.5 MG: 0.5 TABLET ORAL at 15:27

## 2023-11-22 RX ADMIN — FOLIC ACID 1 MG: 5 INJECTION, SOLUTION INTRAMUSCULAR; INTRAVENOUS; SUBCUTANEOUS at 08:30

## 2023-11-22 RX ADMIN — LEVETIRACETAM 500 MG: 500 TABLET, FILM COATED ORAL at 09:27

## 2023-11-22 RX ADMIN — ENOXAPARIN SODIUM 90 MG: 100 INJECTION SUBCUTANEOUS at 08:00

## 2023-11-22 RX ADMIN — MODAFINIL 200 MG: 100 TABLET ORAL at 08:00

## 2023-11-22 RX ADMIN — BUMETANIDE 1 MG: 1 TABLET ORAL at 08:02

## 2023-11-22 RX ADMIN — CHLORHEXIDINE GLUCONATE 0.12% ORAL RINSE 15 ML: 1.2 LIQUID ORAL at 20:31

## 2023-11-22 RX ADMIN — Medication 2 MCG/MIN: at 18:03

## 2023-11-22 RX ADMIN — FAMOTIDINE 20 MG: 20 TABLET ORAL at 08:00

## 2023-11-22 RX ADMIN — MICONAZOLE NITRATE: 2 POWDER TOPICAL at 08:00

## 2023-11-22 RX ADMIN — CHLORHEXIDINE GLUCONATE 0.12% ORAL RINSE 15 ML: 1.2 LIQUID ORAL at 08:00

## 2023-11-22 ASSESSMENT — PULMONARY FUNCTION TESTS
PIF_VALUE: 13
PIF_VALUE: 15
PIF_VALUE: 14
PIF_VALUE: 13
PIF_VALUE: 15
PIF_VALUE: 15

## 2023-11-22 NOTE — PLAN OF CARE
Problem: Respiratory - Adult  Goal: Normal spontaneous ventilation  11/22/2023 1247 by Elfego GARCIA RCP  Outcome: Not Progressing  Vent setting optimized to achieve target tidal volume, respiratory rate and ideal oxygen saturations. SBT will be performed when appropriate. Patient Weaning Progress    The patient's vent settings was not able to be weaned this shift. Ventilator settings that were weaned              [] Mode   [] Pressure support weaned   [] Fio2 weaned   [] Peep weaned      Spontaneous weaning trial  was attempted. *Results of SBT documented under SBTOUTCOME note. Reason that defined ventilator parameters for SBT was not met              [] Patient condition requires increased ventilator settings  [] Requires increased sedation   [] Settings not within weaning range   [] SAT not completed   [] Physician orders    The patient was able to tolerate SBT. Spontanteous VT was 430 and RR 12 breaths/min. Evac tube was  hooked up with continuous low suction(20-30mmHg)        Unable to get agreement for goals because no family is present and patient cannot respond.

## 2023-11-22 NOTE — PLAN OF CARE
Problem: Discharge Planning  Goal: Discharge to home or other facility with appropriate resources  11/22/2023 1031 by Leeanne Hernandez RN  Outcome: Progressing  Flowsheets (Taken 11/22/2023 1031)  Discharge to home or other facility with appropriate resources: Identify barriers to discharge with patient and caregiver     Problem: Chronic Conditions and Co-morbidities  Goal: Patient's chronic conditions and co-morbidity symptoms are monitored and maintained or improved  11/22/2023 1031 by Leeanne Hernandez RN  Outcome: Progressing  Flowsheets (Taken 11/22/2023 1031)  Care Plan - Patient's Chronic Conditions and Co-Morbidity Symptoms are Monitored and Maintained or Improved:   Monitor and assess patient's chronic conditions and comorbid symptoms for stability, deterioration, or improvement   Collaborate with multidisciplinary team to address chronic and comorbid conditions and prevent exacerbation or deterioration   Update acute care plan with appropriate goals if chronic or comorbid symptoms are exacerbated and prevent overall improvement and discharge     Problem: Safety - Adult  Goal: Free from fall injury  11/22/2023 1031 by Leeanne Hernandez RN  Outcome: Progressing  Flowsheets (Taken 11/22/2023 1031)  Free From Fall Injury: Instruct family/caregiver on patient safety     Problem: Pain  Goal: Verbalizes/displays adequate comfort level or baseline comfort level  11/22/2023 1031 by Leeanne Hernandez RN  Outcome: Progressing  Flowsheets (Taken 11/22/2023 1031)  Verbalizes/displays adequate comfort level or baseline comfort level:   Assess pain using appropriate pain scale   Encourage patient to monitor pain and request assistance   Administer analgesics based on type and severity of pain and evaluate response   Implement non-pharmacological measures as appropriate and evaluate response   Consider cultural and social influences on pain and pain management   Notify Licensed Independent Practitioner if interventions patient/family in relaxation techniques, as appropriate   Assess for spiritual pain/suffering and initiate Spiritual Care, Psychosocial Clinical Specialist consults as needed     Problem: Cardiovascular - Adult  Goal: Maintains optimal cardiac output and hemodynamic stability  11/22/2023 1031 by Meryl Fuentes RN  Outcome: Progressing  Flowsheets (Taken 11/22/2023 1031)  Maintains optimal cardiac output and hemodynamic stability:   Monitor blood pressure and heart rate   Monitor urine output and notify Licensed Independent Practitioner for values outside of normal range   Assess for signs of decreased cardiac output   Administer vasoactive medications as ordered     Problem: ABCDS Injury Assessment  Goal: Absence of physical injury  11/22/2023 1031 by Meryl Fuentes RN  Outcome: Progressing  Flowsheets (Taken 11/22/2023 1031)  Absence of Physical Injury: Implement safety measures based on patient assessment     Problem: Musculoskeletal - Adult  Goal: Maintain proper alignment of affected body part  11/22/2023 1031 by Meryl Fuentes RN  Outcome: Progressing  Flowsheets (Taken 11/22/2023 1031)  Maintain proper alignment of affected body part: Support and protect limb and body alignment per provider's orders     Problem: Musculoskeletal - Adult  Goal: Maintain proper alignment of affected body part  Recent Flowsheet Documentation  Outcome: Progressing  Taken 11/22/2023 1031 by Meryl Fuentes RN  Maintain proper alignment of affected body part: Support and protect limb and body alignment per provider's orders    Care plan reviewed with patient. Patient unable to verbalize understanding of the plan of care and contribute to goal setting. This RN narrated care plan to patient.

## 2023-11-22 NOTE — PROGRESS NOTES
Patient:  Edmund Cottrell    Unit/Bed:4D-08/008-A  MRN: [id number]   PCP: Luciana Romero MD  Date of Admission: 10/12/2023    Assessment and Plan(All pulmonary edema, renal failure, PE, and respiratory failure diagnoses are acute in nature unless otherwise specified): Anoxic Brain Injury: Highest GCS obtained is 8. Mostly stays at GCS 5. Patient continues with ventilator support to allow time to determine extent of recovery. Injury is severe persistent vegetative state. Multiple EEGs and continuous EEG show no seizure activity. EEG is consistent with severe encephalopathy. MRI and CT head show no structural injury. Patient did not qualify for hypothermia therapy as she had respiratory arrest leading to cardiac arrest.  There is no data to show recovery benefit in this population. She did receive hypertonic saline to minimize risk of cerebral edema keeping serum sodium 145-150. Patient on Provigil for neuro-stimulation. Keppra dosing decreased 11/13/23. If no seizure activity by 11/28/23, plan to cut dose to 250 mg bid. Look to discontinue Keppra if no seizures for 2 weeks after that. EEG completed 11/16/23 shows ongoing cortical dysfunction. Seems to have reached plateau of neurologic recovery. GCS 5 is the baseline. Assess for any improvement over the next 1 week and one day. If no further recovery, will discuss parameters of care with family. Acute Respiratory Failure: Intubated with difficulty on 10/16/23 secondary to complete airway collapse with no capacity for air exchange. What air was in the lungs was trapped in the lungs. The amount of laryngeal edema was intense and required a stylette to penetrate the edema and secure the airway. Patient remains on mechanical ventilator for airway protection. Continue with ventilator support. Family reports patient would not want tracheostomy tube again-ever. No plans for extubation at this time.  My discussion with  on 11/17/23 spontaneously. Oculocephalic reflexes intact. Data: (All radiographs, tracings, PFTs, and imaging are personally viewed and interpreted unless otherwise noted). Venous Doppler report 11/1/23: Left axillary vein acute thrombus. Chronic DVT left subclavian vein. Telemetry shows sinus rhythm. EEG report 11/16/23:  Cortical dysfunction with severe encephalopathy. WBC 7.4, Hgb 10.9, Plt 259. Sodium 136, potassium 4.1, chloride 97, bicarb 32, BUN 35, creatinine 0.6. Glucose 122. CC time 35 minutes. Time was discontiguous and does not include procedures. Electronically signed by Kaylene Cabot Charlett Mikes M.D.

## 2023-11-22 NOTE — PLAN OF CARE
Problem: Respiratory - Adult  Goal: Normal spontaneous ventilation  Outcome: Progressing     Problem: Respiratory - Adult  Goal: Achieves optimal ventilation and oxygenation  11/22/2023 0454 by Ken Evans RCP  Outcome: Progressing                                                  Patient Weaning Progress    The patient's vent settings was able to be weaned this shift. Ventilator settings that were weaned              [] Mode   [x] Pressure support weaned   [x] Fio2 weaned   [] Peep weaned      Spontaneous weaning trial  was attempted. *Results of SBT documented under SBTOUTCOME note. Reason that defined ventilator parameters for SBT was not met              [] Patient condition requires increased ventilator settings  [] Requires increased sedation   [] Settings not within weaning range   [] SAT not completed   [] Physician orders    The patient was able to tolerate SBT. RSBI  was 37 with EtCO2 of 43 and SpO2 of 94 on 21% FiO2. Spontanteous VT was 403 and RR 15 breaths/min. Patient on spontaneous yesterday and continued to tolerate throughout the night. Evac tube was  hooked up with continuous low suction(20-30mmHg)      Cuff was not  deflated to determine cuff leak. Unable to get agreement for goals because no family is present and patient cannot respond.

## 2023-11-22 NOTE — PLAN OF CARE
Problem: Discharge Planning  Goal: Discharge to home or other facility with appropriate resources  Outcome: Progressing  Flowsemmanuel (Taken 11/21/2023 0007 by Gorge Wren, RN)  Discharge to home or other facility with appropriate resources: Identify barriers to discharge with patient and caregiver     Problem: Chronic Conditions and Co-morbidities  Goal: Patient's chronic conditions and co-morbidity symptoms are monitored and maintained or improved  Outcome: Progressing  Fabian (Taken 11/21/2023 0007 by Gorge Wren RN)  Care Plan - Patient's Chronic Conditions and Co-Morbidity Symptoms are Monitored and Maintained or Improved:   Monitor and assess patient's chronic conditions and comorbid symptoms for stability, deterioration, or improvement   Update acute care plan with appropriate goals if chronic or comorbid symptoms are exacerbated and prevent overall improvement and discharge   Collaborate with multidisciplinary team to address chronic and comorbid conditions and prevent exacerbation or deterioration     Problem: Safety - Adult  Goal: Free from fall injury  Outcome: Progressing  Flowsheets (Taken 11/21/2023 0007 by Gorge Wren RN)  Free From Fall Injury: Instruct family/caregiver on patient safety     Problem: Pain  Goal: Verbalizes/displays adequate comfort level or baseline comfort level  Outcome: Progressing  Flowsheets (Taken 11/21/2023 0007 by Gorge Wren RN)  Verbalizes/displays adequate comfort level or baseline comfort level: Assess pain using appropriate pain scale     Problem: Respiratory - Adult  Goal: Achieves optimal ventilation and oxygenation  Outcome: Progressing  Flowsheets (Taken 11/21/2023 0007 by Gorge Wren RN)  Achieves optimal ventilation and oxygenation: Assess for changes in respiratory status     Problem: Nutrition Deficit:  Goal: Optimize nutritional status  Outcome: Progressing  Flowsemmanuel (Taken 11/21/2023 0007 by Gorge Wren RN)  Nutrient intake appropriate for improving, restoring, or maintaining nutritional needs: Assess nutritional status and recommend course of action     Problem: Skin/Tissue Integrity  Goal: Absence of new skin breakdown  Description: 1. Monitor for areas of redness and/or skin breakdown  2. Assess vascular access sites hourly  3. Every 4-6 hours minimum:  Change oxygen saturation probe site  4. Every 4-6 hours:  If on nasal continuous positive airway pressure, respiratory therapy assess nares and determine need for appliance change or resting period. Outcome: Progressing     Problem: Neurosensory - Adult  Goal: Achieves stable or improved neurological status  Outcome: Progressing  Flowsheets (Taken 11/21/2023 0007 by Leti Ceballos, YVETTE)  Achieves stable or improved neurological status:   Assess for and report changes in neurological status   Initiate measures to prevent increased intracranial pressure  Goal: Absence of seizures  Outcome: Progressing  Flowsheets (Taken 11/20/2023 0235 by Leti Ceballos RN)  Absence of seizures:   Monitor for seizure activity.   If seizure occurs, document type and location of movements and any associated apnea   If seizure occurs, turn head to side and suction secretions as needed     Problem: Skin/Tissue Integrity - Adult  Goal: Incisions, wounds, or drain sites healing without S/S of infection  Outcome: Progressing  Flowsheets (Taken 11/21/2023 0007 by Leti Ceballos RN)  Incisions, Wounds, or Drain Sites Healing Without Sign and Symptoms of Infection: ADMISSION and DAILY: Assess and document risk factors for pressure ulcer development     Problem: Gastrointestinal - Adult  Goal: Maintains adequate nutritional intake  Outcome: Progressing  Flowsheets (Taken 11/21/2023 0007 by Leti Ceballos RN)  Maintains adequate nutritional intake:   Monitor percentage of each meal consumed   Monitor intake and output, weight and lab values     Problem: Genitourinary - Adult  Goal: Urinary catheter remains patent  Outcome: Progressing  Flowsheets (Taken 11/21/2023 0007 by Virgin Kussmaul, RN)  Urinary catheter remains patent: Assess patency of urinary catheter     Problem: Coping  Goal: Pt/Family able to verbalize concerns and demonstrate effective coping strategies  Description: INTERVENTIONS:  1. Assist patient/family to identify coping skills, available support systems and cultural and spiritual values  2. Provide emotional support, including active listening and acknowledgement of concerns of patient and caregivers  3. Reduce environmental stimuli, as able  4. Instruct patient/family in relaxation techniques, as appropriate  5.  Assess for spiritual pain/suffering and initiate Spiritual Care, Psychosocial Clinical Specialist consults as needed  Outcome: Liliane Hernandez (Taken 11/18/2023 2000 by Mignon Freeman RN)  Patient/family able to verbalize anxieties, fears, and concerns, and demonstrate effective coping:   Assist patient/family to identify coping skills, available support systems and cultural and spiritual values   Provide emotional support, including active listening and acknowledgement of concerns of patient and caregivers   Reduce environmental stimuli, as able   Instruct patient/family in relaxation techniques, as appropriate     Problem: Cardiovascular - Adult  Goal: Maintains optimal cardiac output and hemodynamic stability  Outcome: Progressing  Flowsheets (Taken 11/21/2023 0007 by Virgin Kussmaul, RN)  Maintains optimal cardiac output and hemodynamic stability:   Monitor blood pressure and heart rate   Monitor urine output and notify Licensed Independent Practitioner for values outside of normal range     Problem: ABCDS Injury Assessment  Goal: Absence of physical injury  Outcome: Progressing  Flowsheets (Taken 11/21/2023 0007 by Virgin Kussmaul, RN)  Absence of Physical Injury: Implement safety measures based on patient assessment     Problem: Musculoskeletal - Adult  Goal: Maintain proper alignment of affected

## 2023-11-22 NOTE — PROGRESS NOTES
[facility]--. [id number] [facility] PROGRESS NOTE      Patient: Devin Lerma  Room #: 4D-08/008-A            YOB: 1950  Age: 81 y.o. Gender: female            Admit Date & Time: 10/12/2023  6:19 AM    Assessment:    The patient was asleep and intubated at the time of the visit. The patient's spouse shared his frustration related to the patient's illness and his view of care. The patient's spouse shared the last few years since covid infection that he learned to question care providers and to be a better advocate. The patient then shared working for [employer] as that is where they met and became  over 27 years ago. He also shared his passion for his current vehicle with over 300k miles on the odometer. Interventions: The patient's spouse was provided a place to express his stress and dissatisfaction. Outcomes: The patient's spouse expressed gratitude for the conversation and knew he was heard. Plan: 1. Spiritual care will continue to follow the patient according to [facility]. Tiffany's spiritual care SOP.        Electronically signed by Eleno Yañez on 11/22/2023 at 3:55 PM.  Leno  [phone]     11/22/23 9720   Encounter Summary   Encounter Overview/Reason  Follow-up   Service Provided For: Patient;Significant other;Patient and family together   Referral/Consult From: [address] Family members   Last Encounter  11/22/23   Complexity of Encounter High   Begin Time 1410   End Time  1430   Total Time Calculated 20 min   Spiritual/Emotional needs   Type Spiritual Support   Assessment/Intervention/Outcome   Assessment Angry   Intervention Active listening   Outcome Restored Hope;Engaged in conversation

## 2023-11-23 LAB
GLUCOSE BLD STRIP.AUTO-MCNC: 101 MG/DL (ref 70–108)
GLUCOSE BLD STRIP.AUTO-MCNC: 110 MG/DL (ref 70–108)
GLUCOSE BLD STRIP.AUTO-MCNC: 128 MG/DL (ref 70–108)
GLUCOSE BLD STRIP.AUTO-MCNC: 160 MG/DL (ref 70–108)
GLUCOSE BLD STRIP.AUTO-MCNC: 161 MG/DL (ref 70–108)

## 2023-11-23 PROCEDURE — 6370000000 HC RX 637 (ALT 250 FOR IP): Performed by: NURSE PRACTITIONER

## 2023-11-23 PROCEDURE — 82948 REAGENT STRIP/BLOOD GLUCOSE: CPT

## 2023-11-23 PROCEDURE — 2700000000 HC OXYGEN THERAPY PER DAY

## 2023-11-23 PROCEDURE — 6370000000 HC RX 637 (ALT 250 FOR IP): Performed by: INTERNAL MEDICINE

## 2023-11-23 PROCEDURE — 99233 SBSQ HOSP IP/OBS HIGH 50: CPT | Performed by: INTERNAL MEDICINE

## 2023-11-23 PROCEDURE — 94761 N-INVAS EAR/PLS OXIMETRY MLT: CPT

## 2023-11-23 PROCEDURE — 94003 VENT MGMT INPAT SUBQ DAY: CPT

## 2023-11-23 PROCEDURE — 2580000003 HC RX 258: Performed by: NURSE PRACTITIONER

## 2023-11-23 PROCEDURE — 2000000000 HC ICU R&B

## 2023-11-23 PROCEDURE — 2500000003 HC RX 250 WO HCPCS: Performed by: NURSE PRACTITIONER

## 2023-11-23 PROCEDURE — 6360000002 HC RX W HCPCS: Performed by: INTERNAL MEDICINE

## 2023-11-23 RX ORDER — BUMETANIDE 0.5 MG/1
0.5 TABLET ORAL DAILY
Status: DISCONTINUED | OUTPATIENT
Start: 2023-11-24 | End: 2023-11-24

## 2023-11-23 RX ADMIN — SODIUM CHLORIDE, PRESERVATIVE FREE 10 ML: 5 INJECTION INTRAVENOUS at 22:53

## 2023-11-23 RX ADMIN — MODAFINIL 200 MG: 100 TABLET ORAL at 08:46

## 2023-11-23 RX ADMIN — LEVETIRACETAM 500 MG: 500 TABLET, FILM COATED ORAL at 22:53

## 2023-11-23 RX ADMIN — CHLORHEXIDINE GLUCONATE 0.12% ORAL RINSE 15 ML: 1.2 LIQUID ORAL at 22:53

## 2023-11-23 RX ADMIN — ENOXAPARIN SODIUM 80 MG: 100 INJECTION SUBCUTANEOUS at 22:53

## 2023-11-23 RX ADMIN — SODIUM CHLORIDE, PRESERVATIVE FREE 10 ML: 5 INJECTION INTRAVENOUS at 08:53

## 2023-11-23 RX ADMIN — Medication 2 MCG/MIN: at 03:43

## 2023-11-23 RX ADMIN — BUMETANIDE 0.5 MG: 0.5 TABLET ORAL at 08:46

## 2023-11-23 RX ADMIN — FAMOTIDINE 20 MG: 20 TABLET ORAL at 08:46

## 2023-11-23 RX ADMIN — Medication 2 MCG/MIN: at 12:33

## 2023-11-23 RX ADMIN — MICONAZOLE NITRATE: 2 POWDER TOPICAL at 08:52

## 2023-11-23 RX ADMIN — ENOXAPARIN SODIUM 80 MG: 100 INJECTION SUBCUTANEOUS at 08:46

## 2023-11-23 RX ADMIN — INSULIN GLARGINE 30 UNITS: 100 INJECTION, SOLUTION SUBCUTANEOUS at 22:53

## 2023-11-23 RX ADMIN — LEVETIRACETAM 500 MG: 500 TABLET, FILM COATED ORAL at 08:46

## 2023-11-23 RX ADMIN — FAMOTIDINE 20 MG: 20 TABLET ORAL at 22:54

## 2023-11-23 RX ADMIN — FOLIC ACID 1 MG: 1 TABLET ORAL at 08:46

## 2023-11-23 RX ADMIN — MICONAZOLE NITRATE: 2 POWDER TOPICAL at 22:52

## 2023-11-23 RX ADMIN — CHLORHEXIDINE GLUCONATE 0.12% ORAL RINSE 15 ML: 1.2 LIQUID ORAL at 08:54

## 2023-11-23 ASSESSMENT — PAIN SCALES - GENERAL
PAINLEVEL_OUTOF10: 0

## 2023-11-23 ASSESSMENT — PULMONARY FUNCTION TESTS
PIF_VALUE: 15

## 2023-11-23 NOTE — PLAN OF CARE
Problem: Respiratory - Adult  Goal: Normal spontaneous ventilation  11/22/2023 2022 by Frances Chávez RCP  Outcome: Progressing     Problem: Respiratory - Adult  Goal: Achieves optimal ventilation and oxygenation  11/22/2023 2022 by Frances Chávez RCP  Outcome: Not Progressing  Flowsheets (Taken 11/22/2023 2022)  Achieves optimal ventilation and oxygenation:   Assess for changes in respiratory status   Position to facilitate oxygenation and minimize respiratory effort   Assess the need for suctioning and aspirate as needed   Respiratory therapy support as indicated   Assess for changes in mentation and behavior   Oxygen supplementation based on oxygen saturation or arterial blood gases   Encourage broncho-pulmonary hygiene including cough, deep breathe, incentive spirometry   Assess and instruct to report shortness of breath or any respiratory difficulty  Note: Intubated/Vented. Wean as tolerated.  SBT when appropriate

## 2023-11-23 NOTE — PROGRESS NOTES
Patient:  Devin Lerma    Unit/Bed:4D-08/008-A  MRN: [id number]   PCP: Madison Andrade MD  Date of Admission: 10/12/2023    Assessment and Plan(All pulmonary edema, renal failure, PE, and respiratory failure diagnoses are acute in nature unless otherwise specified):        Acute hypoxic respiratory failure: presented to ICU on admission post-OR surgery with ENT. Extubation c/b resp arrest followed by cardiac arrest 10/16/23. Reintubated with difficulty 10/16/23. There was reported complete airway collapse at the time with severe laryngeal edema. Tolerating spontaneous mode with minimal support on ventilator. Unable to extubate due to mental status. Per Dr. Jigar Buitrago discussion with family on 11/17/23, they would consider tracheostomy placement. Plans are for re-evaluation of any progress by 11/30/23. Anoxic brain injury: secondary to respiratory arrest follows by cardiac arrest earlier in hospital course. Per prior provider notes, highest GCS obtained was 8, but mostly stays at 5. GCS currently is 6. Multiple EEG's showed no seizures. EEG showed severe encephalopathy with cortical dysfunction. MRI brain and CT head done previously showed no acute abnormality by imaging. Patient being continued on provigil for neuro-stimulation. Per Dr. Joya Britt, \"Keppra dosing decreased 11/13/23. If no seizure activity by 11/28/23, plan to cut dose to 250 mg bid. Look to discontinue Keppra if no seizures for 2 weeks after that. \" Plan is also for re-evaluation of any neurologic recovery by 11/30/23, with parameters of care to be discussed again with family if no progress is made by then. 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 S/p laryngoscopy and bronchoscopy with debridement 11/16/23. ENT following. Avoid over-extension or sudden extension of neck. Hypotension: requiring low-dose pressors on and off.  On positive pressure hours.     norepinephrine 2 mcg/min (11/23/23 1233)    dextrose          [START ON 11/24/2023] bumetanide  0.5 mg Oral Daily    folic acid  1 mg Oral Daily    enoxaparin  1 mg/kg SubCUTAneous Q12H    levETIRAcetam  500 mg Oral BID    insulin glargine  30 Units SubCUTAneous Nightly    miconazole   Topical BID    insulin lispro  0-16 Units SubCUTAneous Q6H    famotidine  20 mg Oral BID    modafinil  200 mg Oral Daily    sodium chloride flush  5-40 mL IntraVENous 2 times per day    chlorhexidine  15 mL Mouth/Throat BID       24HR INTAKE/OUTPUT:    Intake/Output Summary (Last 24 hours) at 11/23/2023 1632  Last data filed at 11/23/2023 1533  Gross per 24 hour   Intake 1760.17 ml   Output 1650 ml   Net 110.17 ml     Chest x-ray performed on: 11/21/23  Stephanie Russell MD 11/21/2023      Narrative & Impression  PROCEDURE: XR CHEST PORTABLE     CLINICAL INFORMATION: r/o fluid volume and congestion. COMPARISON: Radiograph 11/01/2023    IMPRESSION:  Cardiomegaly with mild pulmonary vascular congestion. PUD and DVT prophylaxis reviewed.   Pepcid 20 mg p.o. twice daily  Lovenox 80 mg SQ every 12 hours    Meets Continued ICU Level Care Criteria:    [x] Yes     Electronically signed by   Shelia Abarca MD on 11/23/2023 at 4:31 PM

## 2023-11-23 NOTE — PLAN OF CARE
Problem: Discharge Planning  Goal: Discharge to home or other facility with appropriate resources  11/22/2023 2256 by Reynold Cee RN  Outcome: Progressing  Flowsheets (Taken 11/22/2023 1031 by Iven Castleman, RN)  Discharge to home or other facility with appropriate resources: Identify barriers to discharge with patient and caregiver  11/22/2023 1031 by Iven Castleman, RN  Outcome: Progressing  Flowsheets (Taken 11/22/2023 1031)  Discharge to home or other facility with appropriate resources: Identify barriers to discharge with patient and caregiver     Problem: Chronic Conditions and Co-morbidities  Goal: Patient's chronic conditions and co-morbidity symptoms are monitored and maintained or improved  11/22/2023 2256 by Reynold Cee RN  Outcome: [place] (Taken 11/22/2023 1031 by Iven Castleman, RN)  Care Plan - Patient's Chronic Conditions and Co-Morbidity Symptoms are Monitored and Maintained or Improved:   Monitor and assess patient's chronic conditions and comorbid symptoms for stability, deterioration, or improvement   Collaborate with multidisciplinary team to address chronic and comorbid conditions and prevent exacerbation or deterioration   Update acute care plan with appropriate goals if chronic or comorbid symptoms are exacerbated and prevent overall improvement and discharge  11/22/2023 1031 by Iven Castleman, RN  Outcome: Progressing  Flowsheets (Taken 11/22/2023 1031)  Care Plan - Patient's Chronic Conditions and Co-Morbidity Symptoms are Monitored and Maintained or Improved:   Monitor and assess patient's chronic conditions and comorbid symptoms for stability, deterioration, or improvement   Collaborate with multidisciplinary team to address chronic and comorbid conditions and prevent exacerbation or deterioration   Update acute care plan with appropriate goals if chronic or comorbid symptoms are exacerbated and prevent overall improvement and discharge     Problem: Safety - factors contributing to decreased intake, treat as appropriate   Monitor intake and output, weight and lab values   Obtain nutritional consult as needed  11/22/2023 1031 by Esthela Cross RN  Outcome: Progressing  Flowsheets (Taken 11/22/2023 1031)  Maintains adequate nutritional intake:   Identify factors contributing to decreased intake, treat as appropriate   Monitor intake and output, weight and lab values   Obtain nutritional consult as needed     Problem: Genitourinary - Adult  Goal: Urinary catheter remains patent  11/22/2023 2256 by Rosenda Chery RN  Outcome: [address] [place] (Taken 11/22/2023 1031 by Esthela Cross RN)  Urinary catheter remains patent: Assess patency of urinary catheter  11/22/2023 1031 by Esthela Cross RN  Outcome: Progressing  Flowsheets (Taken 11/22/2023 1031)  Urinary catheter remains patent: Assess patency of urinary catheter     Problem: Coping  Goal: Pt/Family able to verbalize concerns and demonstrate effective coping strategies  Description: INTERVENTIONS:  1. Assist patient/family to identify coping skills, available support systems and cultural and spiritual values  2. Provide emotional support, including active listening and acknowledgement of concerns of patient and caregivers  3. Reduce environmental stimuli, as able  4. Instruct patient/family in relaxation techniques, as appropriate  5.  Assess for spiritual pain/suffering and initiate Spiritual Care, Psychosocial Clinical Specialist consults as needed  11/22/2023 2256 by Rosenda Chery RN  Outcome: Progressing  Flowsheets (Taken 11/22/2023 1031 by Esthela Cross RN)  Patient/family able to verbalize anxieties, fears, and concerns, and demonstrate effective coping:   Assist patient/family to identify coping skills, available support systems and cultural and spiritual values   Provide emotional support, including active listening and acknowledgement of concerns of patient and caregivers   Reduce by El Couch, RN)  Absence of Physical Injury: Implement safety measures based on patient assessment  11/22/2023 1031 by Satish Barbour RN  Outcome: Progressing  Flowsheets (Taken 11/22/2023 1031)  Absence of Physical Injury: Implement safety measures based on patient assessment     Problem: Musculoskeletal - Adult  Goal: Maintain proper alignment of affected body part  11/22/2023 2256 by Travis Kennedy RN  Outcome: Progressing  Flowsheets (Taken 11/22/2023 1031 by Satish Barbour RN)  Maintain proper alignment of affected body part: Support and protect limb and body alignment per provider's orders  11/22/2023 1031 by Satish Barbour RN  Outcome: Progressing  Flowsheets (Taken 11/22/2023 1031)  Maintain proper alignment of affected body part: Support and protect limb and body alignment per provider's orders     Problem: Respiratory - Adult  Goal: Normal spontaneous ventilation  11/22/2023 2022 by Alexis Kilpatrick RCP  Outcome: Progressing  11/22/2023 1247 by Dorcas Valerio RCP  Outcome: Not Progressing  Goal: Achieves optimal ventilation and oxygenation  11/22/2023 2256 by Travis Kennedy RN  Outcome: Progressing  Flowsheets (Taken 11/22/2023 2022 by Alexis Kilpatrick RCP)  Achieves optimal ventilation and oxygenation:   Assess for changes in respiratory status   Position to facilitate oxygenation and minimize respiratory effort   Assess the need for suctioning and aspirate as needed   Respiratory therapy support as indicated   Assess for changes in mentation and behavior   Oxygen supplementation based on oxygen saturation or arterial blood gases   Encourage broncho-pulmonary hygiene including cough, deep breathe, incentive spirometry   Assess and instruct to report shortness of breath or any respiratory difficulty  11/22/2023 2022 by Alexis Kilpatrick RCP  Outcome: Not Progressing  Flowsheets (Taken 11/22/2023 2022)  Achieves optimal ventilation and oxygenation:   Assess for changes in respiratory

## 2023-11-23 NOTE — PROGRESS NOTES
Patient Weaning Progress    The patient's vent settings was able to be weaned this shift. Ventilator settings that were weaned              [x] Mode   [] Pressure support weaned   [] Fio2 weaned   [] Peep weaned      Spontaneous weaning trial  was attempted. The patient was able to tolerate SBT. RSBI  was 26  SpO2 of 100 on 21% FiO2. Spontanteous VT was 494 and RR 13 breaths/min. Cuff was not  deflated to determine cuff leak. Unable to get agreement for goals because no family is present and patient cannot respond.

## 2023-11-24 ENCOUNTER — APPOINTMENT (OUTPATIENT)
Dept: GENERAL RADIOLOGY | Age: 73
DRG: 163 | End: 2023-11-24
Attending: OTOLARYNGOLOGY
Payer: MEDICARE

## 2023-11-24 LAB
ALBUMIN SERPL BCG-MCNC: 3.5 G/DL (ref 3.5–5.1)
ALP SERPL-CCNC: 130 U/L (ref 38–126)
ALT SERPL W/O P-5'-P-CCNC: 10 U/L (ref 11–66)
ANION GAP SERPL CALC-SCNC: 7 MEQ/L (ref 8–16)
AST SERPL-CCNC: 18 U/L (ref 5–40)
BASOPHILS ABSOLUTE: 0 THOU/MM3 (ref 0–0.1)
BASOPHILS NFR BLD AUTO: 0.2 %
BILIRUB CONJ SERPL-MCNC: < 0.2 MG/DL (ref 0–0.3)
BILIRUB SERPL-MCNC: 0.2 MG/DL (ref 0.3–1.2)
BUN SERPL-MCNC: 34 MG/DL (ref 7–22)
CALCIUM SERPL-MCNC: 9.5 MG/DL (ref 8.5–10.5)
CHLORIDE SERPL-SCNC: 97 MEQ/L (ref 98–111)
CO2 SERPL-SCNC: 33 MEQ/L (ref 23–33)
CREAT SERPL-MCNC: 0.6 MG/DL (ref 0.4–1.2)
DEPRECATED RDW RBC AUTO: 52.1 FL (ref 35–45)
EOSINOPHIL NFR BLD AUTO: 1 %
EOSINOPHILS ABSOLUTE: 0.1 THOU/MM3 (ref 0–0.4)
ERYTHROCYTE [DISTWIDTH] IN BLOOD BY AUTOMATED COUNT: 15.3 % (ref 11.5–14.5)
GFR SERPL CREATININE-BSD FRML MDRD: > 60 ML/MIN/1.73M2
GLUCOSE BLD STRIP.AUTO-MCNC: 129 MG/DL (ref 70–108)
GLUCOSE BLD STRIP.AUTO-MCNC: 129 MG/DL (ref 70–108)
GLUCOSE BLD STRIP.AUTO-MCNC: 132 MG/DL (ref 70–108)
GLUCOSE BLD STRIP.AUTO-MCNC: 149 MG/DL (ref 70–108)
GLUCOSE BLD STRIP.AUTO-MCNC: 153 MG/DL (ref 70–108)
GLUCOSE SERPL-MCNC: 161 MG/DL (ref 70–108)
HCT VFR BLD AUTO: 33 % (ref 37–47)
HGB BLD-MCNC: 10.5 GM/DL (ref 12–16)
IMM GRANULOCYTES # BLD AUTO: 0.06 THOU/MM3 (ref 0–0.07)
IMM GRANULOCYTES NFR BLD AUTO: 0.6 %
LYMPHOCYTES ABSOLUTE: 1.3 THOU/MM3 (ref 1–4.8)
LYMPHOCYTES NFR BLD AUTO: 14.1 %
MCH RBC QN AUTO: 29.4 PG (ref 26–33)
MCHC RBC AUTO-ENTMCNC: 31.8 GM/DL (ref 32.2–35.5)
MCV RBC AUTO: 92.4 FL (ref 81–99)
MONOCYTES ABSOLUTE: 0.6 THOU/MM3 (ref 0.4–1.3)
MONOCYTES NFR BLD AUTO: 6.1 %
NEUTROPHILS NFR BLD AUTO: 78 %
NRBC BLD AUTO-RTO: 0 /100 WBC
PLATELET # BLD AUTO: 239 THOU/MM3 (ref 130–400)
PMV BLD AUTO: 9.8 FL (ref 9.4–12.4)
POTASSIUM SERPL-SCNC: 4.2 MEQ/L (ref 3.5–5.2)
PROT SERPL-MCNC: 6.2 G/DL (ref 6.1–8)
RBC # BLD AUTO: 3.57 MILL/MM3 (ref 4.2–5.4)
SEGMENTED NEUTROPHILS ABSOLUTE COUNT: 7.3 THOU/MM3 (ref 1.8–7.7)
SODIUM SERPL-SCNC: 137 MEQ/L (ref 135–145)
WBC # BLD AUTO: 9.3 THOU/MM3 (ref 4.8–10.8)

## 2023-11-24 PROCEDURE — 82248 BILIRUBIN DIRECT: CPT

## 2023-11-24 PROCEDURE — 80053 COMPREHEN METABOLIC PANEL: CPT

## 2023-11-24 PROCEDURE — 36415 COLL VENOUS BLD VENIPUNCTURE: CPT

## 2023-11-24 PROCEDURE — 6370000000 HC RX 637 (ALT 250 FOR IP): Performed by: NURSE PRACTITIONER

## 2023-11-24 PROCEDURE — 82948 REAGENT STRIP/BLOOD GLUCOSE: CPT

## 2023-11-24 PROCEDURE — 94003 VENT MGMT INPAT SUBQ DAY: CPT

## 2023-11-24 PROCEDURE — 99024 POSTOP FOLLOW-UP VISIT: CPT | Performed by: REGISTERED NURSE

## 2023-11-24 PROCEDURE — 71045 X-RAY EXAM CHEST 1 VIEW: CPT

## 2023-11-24 PROCEDURE — 2000000000 HC ICU R&B

## 2023-11-24 PROCEDURE — 6360000002 HC RX W HCPCS: Performed by: INTERNAL MEDICINE

## 2023-11-24 PROCEDURE — 99233 SBSQ HOSP IP/OBS HIGH 50: CPT | Performed by: INTERNAL MEDICINE

## 2023-11-24 PROCEDURE — 94761 N-INVAS EAR/PLS OXIMETRY MLT: CPT

## 2023-11-24 PROCEDURE — 6370000000 HC RX 637 (ALT 250 FOR IP): Performed by: INTERNAL MEDICINE

## 2023-11-24 PROCEDURE — 2580000003 HC RX 258: Performed by: NURSE PRACTITIONER

## 2023-11-24 PROCEDURE — 85025 COMPLETE CBC W/AUTO DIFF WBC: CPT

## 2023-11-24 PROCEDURE — 2700000000 HC OXYGEN THERAPY PER DAY

## 2023-11-24 RX ORDER — BUMETANIDE 0.5 MG/1
0.5 TABLET ORAL 2 TIMES DAILY
Status: DISCONTINUED | OUTPATIENT
Start: 2023-11-24 | End: 2023-11-27

## 2023-11-24 RX ADMIN — ENOXAPARIN SODIUM 80 MG: 100 INJECTION SUBCUTANEOUS at 08:41

## 2023-11-24 RX ADMIN — BUMETANIDE 0.5 MG: 0.5 TABLET ORAL at 21:15

## 2023-11-24 RX ADMIN — MODAFINIL 200 MG: 100 TABLET ORAL at 08:41

## 2023-11-24 RX ADMIN — SODIUM CHLORIDE, PRESERVATIVE FREE 10 ML: 5 INJECTION INTRAVENOUS at 21:16

## 2023-11-24 RX ADMIN — LEVETIRACETAM 500 MG: 500 TABLET, FILM COATED ORAL at 08:41

## 2023-11-24 RX ADMIN — MICONAZOLE NITRATE: 2 POWDER TOPICAL at 21:15

## 2023-11-24 RX ADMIN — LEVETIRACETAM 500 MG: 500 TABLET, FILM COATED ORAL at 21:16

## 2023-11-24 RX ADMIN — SODIUM CHLORIDE, PRESERVATIVE FREE 10 ML: 5 INJECTION INTRAVENOUS at 08:41

## 2023-11-24 RX ADMIN — BUMETANIDE 0.5 MG: 0.5 TABLET ORAL at 08:41

## 2023-11-24 RX ADMIN — MICONAZOLE NITRATE: 2 POWDER TOPICAL at 08:42

## 2023-11-24 RX ADMIN — CHLORHEXIDINE GLUCONATE 0.12% ORAL RINSE 15 ML: 1.2 LIQUID ORAL at 21:16

## 2023-11-24 RX ADMIN — INSULIN GLARGINE 30 UNITS: 100 INJECTION, SOLUTION SUBCUTANEOUS at 21:15

## 2023-11-24 RX ADMIN — FAMOTIDINE 20 MG: 20 TABLET ORAL at 08:41

## 2023-11-24 RX ADMIN — ENOXAPARIN SODIUM 80 MG: 100 INJECTION SUBCUTANEOUS at 21:15

## 2023-11-24 RX ADMIN — CHLORHEXIDINE GLUCONATE 0.12% ORAL RINSE 15 ML: 1.2 LIQUID ORAL at 08:42

## 2023-11-24 RX ADMIN — FAMOTIDINE 20 MG: 20 TABLET ORAL at 21:15

## 2023-11-24 RX ADMIN — FOLIC ACID 1 MG: 1 TABLET ORAL at 08:41

## 2023-11-24 ASSESSMENT — PAIN SCALES - GENERAL
PAINLEVEL_OUTOF10: 0
PAINLEVEL_OUTOF10: 0

## 2023-11-24 ASSESSMENT — PULMONARY FUNCTION TESTS
PIF_VALUE: 14
PIF_VALUE: 15
PIF_VALUE: 16
PIF_VALUE: 15

## 2023-11-24 NOTE — PLAN OF CARE
Problem: Neurosensory - Adult  Goal: Achieves stable or improved neurological status  Outcome: Not Progressing  Flowsheets (Taken 11/22/2023 1031 by Cooper Vilchis RN)  Achieves stable or improved neurological status:   Assess for and report changes in neurological status   Initiate measures to prevent increased intracranial pressure   Maintain blood pressure and fluid volume within ordered parameters to optimize cerebral perfusion and minimize risk of hemorrhage   Monitor temperature, glucose, and sodium.  Initiate appropriate interventions as ordered     Problem: Discharge Planning  Goal: Discharge to home or other facility with appropriate resources  Outcome: Progressing  Flowsheets (Taken 11/22/2023 1031 by Cooper Vilchis RN)  Discharge to home or other facility with appropriate resources: Identify barriers to discharge with patient and caregiver     Problem: Chronic Conditions and Co-morbidities  Goal: Patient's chronic conditions and co-morbidity symptoms are monitored and maintained or improved  Outcome: Progressing  Flowsheets (Taken 11/22/2023 1031 by Cooper Vilchis RN)  Care Plan - Patient's Chronic Conditions and Co-Morbidity Symptoms are Monitored and Maintained or Improved:   Monitor and assess patient's chronic conditions and comorbid symptoms for stability, deterioration, or improvement   Collaborate with multidisciplinary team to address chronic and comorbid conditions and prevent exacerbation or deterioration   Update acute care plan with appropriate goals if chronic or comorbid symptoms are exacerbated and prevent overall improvement and discharge     Problem: Respiratory - Adult  Goal: Achieves optimal ventilation and oxygenation  Outcome: Progressing  Flowsheets (Taken 11/22/2023 2022 by Bre Pagan RCP)  Achieves optimal ventilation and oxygenation:   Assess for changes in respiratory status   Position to facilitate oxygenation and minimize respiratory effort   Assess the need for

## 2023-11-24 NOTE — PLAN OF CARE
Problem: Respiratory - Adult  Goal: Normal spontaneous ventilation  11/24/2023 1336 by Clare Dodge RCP  Outcome: Not Progressing                                               Patient Weaning Progress    The patient's vent settings was not able to be weaned this shift. Ventilator settings that were weaned              [] Mode   [] Pressure support weaned   [] Fio2 weaned   [] Peep weaned      Spontaneous weaning trial  was attempted. *Results of SBT documented under SBTOUTCOME note. The patient was able to tolerate SBT. RSBI  was 30 with EtCO2 of 41 and SpO2 of 95 on 92% FiO2. Spontanteous VT was 444 and RR 14 breaths/min. The patient was on SBT indefinitely    Evac tube was  hooked up with continuous low suction(20-30mmHg)      Family mutually agreed on goals.

## 2023-11-24 NOTE — PROGRESS NOTES
Comprehensive Nutrition Assessment    Type and Reason for Visit:  Reassess (Tube Feed Monitor)    Nutrition Recommendations/Plan:   Tube Feed: Glucerna 1.5 at 40ml/hour as goal - continuous tube feed per Dr Georgiann Leventhal 11/20  Free H20 flush of 200ml every 6 hours per Dr Georgiann Leventhal 11/20  Recommend scheduled bowel meds if needed - at one point this admit pt. Went 12 days without a BM     Malnutrition Assessment:  Malnutrition Status: At risk for malnutrition (Comment) (11/20/23 1529)    Context:  Chronic Illness     Findings of the 6 clinical characteristics of malnutrition:  Energy Intake:  No significant decrease in energy intake (pt. tolerating TF since admit)  Weight Loss:  Greater than 10% over 6 months (31# or 16% in 5 months)     Body Fat Loss:  No significant body fat loss     Muscle Mass Loss:  No significant muscle mass loss Temples (temporalis), Clavicles (pectoralis & deltoids)  Fluid Accumulation:  Mild     Strength:  Not Performed    Nutrition Assessment:     Pt. improving from a nutritional standpoint AEB tolerating TF at goal while NPO as intubated. Remains at risk for further nutritional compromise r/t intubated s/p ENT surgery 10/12, admit d/t tracheal stenosis from trach placed 4/2022, complex COVID Hx; s/p code blue 10/16 - anoxic brain injury, increased nutrient needs to aid in wound healing, debridement 11/16, LOS day 43 and underlying medical condition (DM - A1C 5.5% 1/2023, CAD,THR 2/2023).       Nutrition Related Findings:    Pt. Report/Treatments/Miscellaneous: patient seen, tube feed infusing via NG tube with Glucerna 1.5 at 40ml/ hour and water flush every 6 hours per Physician, RN reports tolerating tube feed   GI Status: BM 11/23  Pertinent Labs: Sodium 137, Potassium 4.2, BUN 34, Creatinine 0.6, glucose 129  Pertinent Meds: bumex, folvite, lantus, humalog, keppra      Wound Type: Pressure Injury, Stage II (coccyx; stage 1 buttocks; skin tear abdomen; 10/12 ENT surgery: Cricotracheal Resection, and/or Nutrient Delivery: Continue NPO, Continue Current Tube Feeding  Nutrition Education/Counseling: No recommendation at this time  Coordination of Nutrition Care: Continue to monitor while inpatient, Interdisciplinary Rounds       Goals:  Previous Goal Met: Progressing toward Goal(s)  Goals: Tolerate nutrition support at goal rate, by next RD assessment       Nutrition Monitoring and Evaluation:      Food/Nutrient Intake Outcomes: Enteral Nutrition Intake/Tolerance  Physical Signs/Symptoms Outcomes: Biochemical Data, Chewing or Swallowing, GI Status, Fluid Status or Edema, Hemodynamic Status, Nutrition Focused Physical Findings, Skin, Weight    Discharge Planning:     Too soon to determine     Jetty ALISHA Mccullough, LD  Contact: [phone]

## 2023-11-24 NOTE — PLAN OF CARE
Problem: Respiratory - Adult  Goal: Normal spontaneous ventilation  Outcome: Progressing                                                Patient Weaning Progress    The patient's vent settings was not able to be weaned this shift. Ventilator settings that were weaned              [] Mode   [] Pressure support weaned   [] Fio2 weaned   [] Peep weaned      Spontaneous weaning trial  was attempted. due to defined parameters for SBT (spontaneous breathing trial) not being met. *Results of SBT documented under SBTOUTCOME note. Reason that defined ventilator parameters for SBT was not met              [] Patient condition requires increased ventilator settings  [] Requires increased sedation   [] Settings not within weaning range   [] SAT not completed   [] Physician orders    The patient was able to tolerate SBT. RSBI  was 25 with EtCO2 of 41 and SpO2 of 100 on 25% FiO2. Spontanteous VT was 555 and RR 14 breaths/min. Evac tube was not  hooked up with continuous low suction(20-30mmHg)      Cuff was not  deflated to determine cuff leak. Unable to get agreement for goals because no family is present and patient cannot respond.

## 2023-11-24 NOTE — PROGRESS NOTES
refill < 3 seconds. Palpable dorsalis pedis pulses. Skin:  warm and dry. Psych:  No conscious effort seen. Affect appropriate  Lymph:  No supraclavicular adenopathy. Neurologic:  No overt signs of seizures. No conscious effort. Does not open eyes spontaneously for me. No spontaneous or purposeful movements. Withdraws x4 extremities to nailbed pressure. Oculocephalic reflex is intact. No tracking nor echolocation. Corneal reflex intact. Gag and cough reflexes intact. Spontaneous respirations observed. No posturing. No clonus. No fasciculations nor nystagmus. Data: (All radiographs, tracings, PFTs, and imaging are personally viewed and interpreted unless otherwise noted). EEG 11/16/23 report: This EEG was abnormal. Disorganized and slow background and presence of triphasic waves with general attenuation of the study suggestive of underlying cortical dysfunction and severe encephalopathy. MRI brain 11/21/23 report: No evidence of an acute infarct. No evidence of anoxia. Stable mild severity chronic small vessel ischemic changes. Global volume loss. CXR 11/24/23 report: No significant interval change from previous exam. Support devices in appropriate position. Improved small left pleural effusion and overlying atelectasis. On my view, appears that pulmonary edema is improving since last CXR comparison. TTE 10/21/23 report: Compare to previous study. no significant chnages noted. Normal left ventricular cavity with overall normal systolic function. Ejection fraction is visually estimated at 55-60%. No regional wall motion abnormalities. Normal right ventricular size and function. No significant valvular abnormalities. No evidence of any pericardial effusion. Thick epicardial fat pat noted anteriorly. Na 137 K 4.2 Cl 97 CO2 33 BUN 34 Cr 0.6 Gluc 129 Ca 9.5  Albumin 3.5 alk phos 130 ALT 10 AST 18 Bili 0.2  WBC 9.3 Hgb 10.5 Hct 33.0 Plt 239      Case discussed with Dr. Piedad Leigh.    Electronically signed by No results for input(s): \"LACTA\" in the last 72 hours. norepinephrine Stopped (11/23/23 1547)    dextrose          bumetanide  0.5 mg Oral BID    folic acid  1 mg Oral Daily    enoxaparin  1 mg/kg SubCUTAneous Q12H    levETIRAcetam  500 mg Oral BID    insulin glargine  30 Units SubCUTAneous Nightly    miconazole   Topical BID    insulin lispro  0-16 Units SubCUTAneous Q6H    famotidine  20 mg Oral BID    modafinil  200 mg Oral Daily    sodium chloride flush  5-40 mL IntraVENous 2 times per day    chlorhexidine  15 mL Mouth/Throat BID       24HR INTAKE/OUTPUT:    Intake/Output Summary (Last 24 hours) at 11/24/2023 1637  Last data filed at 11/24/2023 1627  Gross per 24 hour   Intake 1355.65 ml   Output 1550 ml   Net -194.35 ml       Chest x-ray performed on: 11/24/23  Reading Physician Reading Date Result Priority   Kim Joseph MD 11/24/2023      Narrative & Impression  1 view chest x-ray     Comparison: CR/KO/SR - XR CHEST PORTABLE - 11/21/2023 11:48 AM EST    IMPRESSION:  1. No significant interval change from previous exam. Support devices in   appropriate position. Improved small left pleural effusion and overlying   atelectasis. PUD and DVT prophylaxis reviewed.   Pepcid 20 mg p.o. twice daily  Lovenox 80 mg subcu twice daily    Meets Continued ICU Level Care Criteria:    [x] Yes     Electronically signed by   Adam Cao MD on 11/24/2023 at 4:37 PM

## 2023-11-24 NOTE — PROGRESS NOTES
11/24/23 1425   Encounter Summary   Encounter Overview/Reason  Follow-up   Service Provided For: Patient   Referral/Consult From: [address] Family members   Last Encounter  11/24/23  (N/R)   Complexity of Encounter Low   Begin Time 1420   End Time  1425   Total Time Calculated 5 min   Assessment/Intervention/Outcome   Assessment Unable to assess   Intervention Prayer (assurance of)/Copake     In my encounter with the 81  yr old patient, I attempted to see the patient in ICU, but the patient was unresponsive at this time. No family was present in the room. I offered a prayer at the pt's side. A  will attempt to see the patient at a later time as a follow up. The pt was admitted due to tracheal stenosis due to tracheostomy.

## 2023-11-25 LAB
GLUCOSE BLD STRIP.AUTO-MCNC: 119 MG/DL (ref 70–108)
GLUCOSE BLD STRIP.AUTO-MCNC: 129 MG/DL (ref 70–108)
GLUCOSE BLD STRIP.AUTO-MCNC: 132 MG/DL (ref 70–108)
GLUCOSE BLD STRIP.AUTO-MCNC: 135 MG/DL (ref 70–108)
GLUCOSE BLD STRIP.AUTO-MCNC: 137 MG/DL (ref 70–108)

## 2023-11-25 PROCEDURE — 82948 REAGENT STRIP/BLOOD GLUCOSE: CPT

## 2023-11-25 PROCEDURE — 6370000000 HC RX 637 (ALT 250 FOR IP): Performed by: INTERNAL MEDICINE

## 2023-11-25 PROCEDURE — 99233 SBSQ HOSP IP/OBS HIGH 50: CPT | Performed by: INTERNAL MEDICINE

## 2023-11-25 PROCEDURE — 89220 SPUTUM SPECIMEN COLLECTION: CPT

## 2023-11-25 PROCEDURE — 2580000003 HC RX 258: Performed by: NURSE PRACTITIONER

## 2023-11-25 PROCEDURE — 94761 N-INVAS EAR/PLS OXIMETRY MLT: CPT

## 2023-11-25 PROCEDURE — 6370000000 HC RX 637 (ALT 250 FOR IP): Performed by: NURSE PRACTITIONER

## 2023-11-25 PROCEDURE — 6360000002 HC RX W HCPCS: Performed by: INTERNAL MEDICINE

## 2023-11-25 PROCEDURE — 2700000000 HC OXYGEN THERAPY PER DAY

## 2023-11-25 PROCEDURE — 2000000000 HC ICU R&B

## 2023-11-25 RX ADMIN — SODIUM CHLORIDE, PRESERVATIVE FREE 10 ML: 5 INJECTION INTRAVENOUS at 08:42

## 2023-11-25 RX ADMIN — INSULIN GLARGINE 30 UNITS: 100 INJECTION, SOLUTION SUBCUTANEOUS at 21:07

## 2023-11-25 RX ADMIN — SODIUM CHLORIDE, PRESERVATIVE FREE 10 ML: 5 INJECTION INTRAVENOUS at 08:41

## 2023-11-25 RX ADMIN — FAMOTIDINE 20 MG: 20 TABLET ORAL at 21:05

## 2023-11-25 RX ADMIN — ENOXAPARIN SODIUM 80 MG: 100 INJECTION SUBCUTANEOUS at 08:38

## 2023-11-25 RX ADMIN — FAMOTIDINE 20 MG: 20 TABLET ORAL at 08:38

## 2023-11-25 RX ADMIN — MICONAZOLE NITRATE: 2 POWDER TOPICAL at 12:03

## 2023-11-25 RX ADMIN — CHLORHEXIDINE GLUCONATE 0.12% ORAL RINSE 15 ML: 1.2 LIQUID ORAL at 19:39

## 2023-11-25 RX ADMIN — MODAFINIL 200 MG: 100 TABLET ORAL at 08:38

## 2023-11-25 RX ADMIN — FOLIC ACID 1 MG: 1 TABLET ORAL at 08:38

## 2023-11-25 RX ADMIN — LEVETIRACETAM 500 MG: 500 TABLET, FILM COATED ORAL at 08:38

## 2023-11-25 RX ADMIN — SODIUM CHLORIDE, PRESERVATIVE FREE 10 ML: 5 INJECTION INTRAVENOUS at 19:37

## 2023-11-25 RX ADMIN — CHLORHEXIDINE GLUCONATE 0.12% ORAL RINSE 15 ML: 1.2 LIQUID ORAL at 08:39

## 2023-11-25 RX ADMIN — ENOXAPARIN SODIUM 80 MG: 100 INJECTION SUBCUTANEOUS at 21:07

## 2023-11-25 RX ADMIN — BUMETANIDE 0.5 MG: 0.5 TABLET ORAL at 08:38

## 2023-11-25 ASSESSMENT — PULMONARY FUNCTION TESTS
PIF_VALUE: 15
PIF_VALUE: 16

## 2023-11-25 NOTE — PLAN OF CARE
Problem: Respiratory - Adult  Goal: Achieves optimal ventilation and oxygenation  Outcome: Not Progressing  Flowsheets (Taken 11/25/2023 0608)  Achieves optimal ventilation and oxygenation:   Assess for changes in respiratory status   Position to facilitate oxygenation and minimize respiratory effort   Assess the need for suctioning and aspirate as needed   Respiratory therapy support as indicated   Assess for changes in mentation and behavior   Encourage broncho-pulmonary hygiene including cough, deep breathe, incentive spirometry   Oxygen supplementation based on oxygen saturation or arterial blood gases   Assess and instruct to report shortness of breath or any respiratory difficulty  Note: Intubated/Vented. Wean as tolerated. SBT when appropriate. Patient Weaning Progress    The patient's vent settings was able to be weaned this shift. Ventilator settings that were weaned              [x] Mode   [] Pressure support weaned   [] Fio2 weaned   [] Peep weaned      Spontaneous weaning trial  was attempted. The patient was able to tolerate SBT. RSBI  was 32 with EtCO2 of 42 and SpO2 of 97 on 25% FiO2. Spontanteous VT was 433 and RR 14 breaths/min. Evac tube was not  hooked up with continuous low suction(20-30mmHg)        Unable to get agreement for goals because no family is present and patient cannot respond.

## 2023-11-25 NOTE — PLAN OF CARE
Problem: Respiratory - Adult  Goal: Normal spontaneous ventilation  11/25/2023 0832 by Radha Bullock RCP  Outcome: Progressing  Pt on SBT  and tolerating well, will extubate when appropriate. Patient Weaning Progress    The patient's vent settings was not able to be weaned this shift. Ventilator settings that were weaned              [] Mode   [] Pressure support weaned   [] Fio2 weaned   [] Peep weaned      Spontaneous weaning trial  was attempted. Reason that defined ventilator parameters for SBT was not met              [] Patient condition requires increased ventilator settings  [] Requires increased sedation   [] Settings not within weaning range   [] SAT not completed   [] Physician orders    The patient was able to tolerate SBT. RSBI  was 30 with EtCO2 of 44 and SpO2 of 97 on 25% FiO2. Spontanteous VT was 405 and RR 16 breaths/min. Unable to get agreement for goals because no family is present and patient cannot respond.

## 2023-11-26 LAB
GLUCOSE BLD STRIP.AUTO-MCNC: 101 MG/DL (ref 70–108)
GLUCOSE BLD STRIP.AUTO-MCNC: 113 MG/DL (ref 70–108)
GLUCOSE BLD STRIP.AUTO-MCNC: 140 MG/DL (ref 70–108)
GLUCOSE BLD STRIP.AUTO-MCNC: 145 MG/DL (ref 70–108)
GLUCOSE BLD STRIP.AUTO-MCNC: 88 MG/DL (ref 70–108)

## 2023-11-26 PROCEDURE — 2500000003 HC RX 250 WO HCPCS: Performed by: NURSE PRACTITIONER

## 2023-11-26 PROCEDURE — 2700000000 HC OXYGEN THERAPY PER DAY

## 2023-11-26 PROCEDURE — 6360000002 HC RX W HCPCS: Performed by: INTERNAL MEDICINE

## 2023-11-26 PROCEDURE — 6370000000 HC RX 637 (ALT 250 FOR IP): Performed by: NURSE PRACTITIONER

## 2023-11-26 PROCEDURE — 94003 VENT MGMT INPAT SUBQ DAY: CPT

## 2023-11-26 PROCEDURE — 99233 SBSQ HOSP IP/OBS HIGH 50: CPT | Performed by: INTERNAL MEDICINE

## 2023-11-26 PROCEDURE — 94761 N-INVAS EAR/PLS OXIMETRY MLT: CPT

## 2023-11-26 PROCEDURE — 82948 REAGENT STRIP/BLOOD GLUCOSE: CPT

## 2023-11-26 PROCEDURE — 6370000000 HC RX 637 (ALT 250 FOR IP): Performed by: INTERNAL MEDICINE

## 2023-11-26 PROCEDURE — 2000000000 HC ICU R&B

## 2023-11-26 PROCEDURE — 2580000003 HC RX 258: Performed by: NURSE PRACTITIONER

## 2023-11-26 RX ADMIN — CHLORHEXIDINE GLUCONATE 0.12% ORAL RINSE 15 ML: 1.2 LIQUID ORAL at 08:23

## 2023-11-26 RX ADMIN — LEVETIRACETAM 500 MG: 500 TABLET, FILM COATED ORAL at 08:14

## 2023-11-26 RX ADMIN — SODIUM CHLORIDE, PRESERVATIVE FREE 10 ML: 5 INJECTION INTRAVENOUS at 08:14

## 2023-11-26 RX ADMIN — CHLORHEXIDINE GLUCONATE 0.12% ORAL RINSE 15 ML: 1.2 LIQUID ORAL at 22:18

## 2023-11-26 RX ADMIN — SODIUM CHLORIDE, PRESERVATIVE FREE 10 ML: 5 INJECTION INTRAVENOUS at 08:15

## 2023-11-26 RX ADMIN — BUMETANIDE 0.5 MG: 0.5 TABLET ORAL at 23:41

## 2023-11-26 RX ADMIN — BUMETANIDE 0.5 MG: 0.5 TABLET ORAL at 08:14

## 2023-11-26 RX ADMIN — FAMOTIDINE 20 MG: 20 TABLET ORAL at 22:24

## 2023-11-26 RX ADMIN — LEVETIRACETAM 500 MG: 500 TABLET, FILM COATED ORAL at 23:41

## 2023-11-26 RX ADMIN — SODIUM CHLORIDE, PRESERVATIVE FREE 10 ML: 5 INJECTION INTRAVENOUS at 22:18

## 2023-11-26 RX ADMIN — ENOXAPARIN SODIUM 80 MG: 100 INJECTION SUBCUTANEOUS at 22:25

## 2023-11-26 RX ADMIN — MICONAZOLE NITRATE: 2 POWDER TOPICAL at 22:18

## 2023-11-26 RX ADMIN — LEVETIRACETAM 500 MG: 500 TABLET, FILM COATED ORAL at 01:37

## 2023-11-26 RX ADMIN — FOLIC ACID 1 MG: 1 TABLET ORAL at 08:46

## 2023-11-26 RX ADMIN — MICONAZOLE NITRATE: 2 POWDER TOPICAL at 01:49

## 2023-11-26 RX ADMIN — FAMOTIDINE 20 MG: 20 TABLET ORAL at 08:22

## 2023-11-26 RX ADMIN — BUMETANIDE 0.5 MG: 0.5 TABLET ORAL at 01:37

## 2023-11-26 RX ADMIN — INSULIN GLARGINE 30 UNITS: 100 INJECTION, SOLUTION SUBCUTANEOUS at 22:24

## 2023-11-26 RX ADMIN — ENOXAPARIN SODIUM 80 MG: 100 INJECTION SUBCUTANEOUS at 08:23

## 2023-11-26 RX ADMIN — MICONAZOLE NITRATE: 2 POWDER TOPICAL at 12:00

## 2023-11-26 RX ADMIN — MODAFINIL 200 MG: 100 TABLET ORAL at 08:23

## 2023-11-26 ASSESSMENT — PULMONARY FUNCTION TESTS
PIF_VALUE: 15

## 2023-11-26 NOTE — PROGRESS NOTES
CRITICAL CARE PROGRESS NOTE      Patient:  Milena Moser    Unit/Bed:4D-08/008-A  YOB: 1950  MRN: [id number]   PCP: Heather Bruner MD  Date of Admission: 10/12/2023    Anoxic Brain Injury: Highest GCS obtained is 8. Mostly stays at GCS 5. Patient continues with ventilator support to allow time to determine extent of recovery. Injury is severe persistent vegetative state. Multiple EEGs and continuous EEG show no seizure activity. EEG is consistent with severe encephalopathy. MRI and CT head show no structural injury. Patient did not qualify for hypothermia therapy as she had respiratory arrest leading to cardiac arrest.  There is no data to show recovery benefit in this population. She did receive hypertonic saline to minimize risk of cerebral edema keeping serum sodium 145-150. Patient on Provigil for neuro-stimulation. Keppra dosing decreased 11/13/23. If no seizure activity by 11/28/23, plan to cut dose to 250 mg bid. Look to discontinue Keppra if no seizures for 2 weeks after that. EEG completed 11/16/23 shows ongoing cortical dysfunction. Seems to have reached plateau of neurologic recovery. GCS 5 is the baseline. Assess for any improvement over the next 1 week . If no further recovery, will discuss parameters of care with family. Acute Respiratory Failure: Intubated with difficulty on 10/16/23 secondary to complete airway collapse with no capacity for air exchange. What air was in the lungs was trapped in the lungs. The amount of laryngeal edema was intense and required a stylette to penetrate the edema and secure the airway. Patient remains on mechanical ventilator for airway protection. Continue with ventilator support. Family reports patient would not want tracheostomy tube again-ever. No plans for extubation at this time. My discussion with  on 11/17/23 indicates they would consider tracheostomy tube to extend time to recover.   Post Cricotracheal Data: (All radiographs, tracings, PFTs, and imaging are personally viewed and interpreted unless otherwise noted). Venous Doppler report 11/1/23: Left axillary vein acute thrombus. Chronic DVT left subclavian vein. Telemetry shows sinus rhythm. EEG report 11/16/23:  Cortical dysfunction with severe encephalopathy. POC glucose 88      Meets Continued ICU Level Care Criteria:    [x] Yes   [] No - Transfer Planned to listed location:  [] HOSPITALIST CONTACTED-      Case and plan discussed with Dr. Bentley Wyman  Electronically signed by Galileo Garnica. WYATT Kraus CNP  CRITICAL CARE SPECIALIST     Addendum by Dr. Bentley Wyman MD:  Patient seen by me independently including key components of medical care. Face to face evaluation and examination was performed. Case discussed with Ms. Galileo Garnica. WYATT Kraus CNP. Agree with Certified nurse practitioner's findings and plan as documented in the Certified nurse practitioner's note. Italicized font, if present,  represents changes to the note made by me. More than 50% of the encounter time involved with patient care by the Pulmonary & Critical care service team spent by me. Please see my modifications mentioned below:  Patient remained critically ill  Remained on the ventilator with no sedation  Patient remained unresponsive to deep painful stimuli. No spontaneous eye movements were noted.     Vent Settings:  Vent Mode: CPAP/PS Resp Rate (Set): 0 bpm/Vt (Set, mL): 0 mL/ /FiO2 : 25 %    Lab Results   Component Value Date/Time    PH 7.37 10/16/2023 01:49 PM    PCO2 40 10/16/2023 01:49 PM    PO2 372 10/16/2023 01:49 PM    HCO3 23 10/16/2023 01:49 PM    O2SAT 100 10/16/2023 01:49 PM     Lab Results   Component Value Date/Time    IFIO2 100 10/16/2023 01:49 PM    MODE PC/PS 10/14/2023 01:20 PM    SETPEEP 6.0 10/16/2023 01:49 PM       CBC:   Recent Labs     11/24/23  0330   WBC 9.3   HGB 10.5*   HCT 33.0*        BMP:  Recent Labs     11/24/23  0330      K 4.2

## 2023-11-26 NOTE — PLAN OF CARE
Problem: Safety - Adult  Goal: Free from fall injury  Outcome: Progressing     Problem: Discharge Planning  Goal: Discharge to home or other facility with appropriate resources  Outcome: Not Progressing     Problem: Chronic Conditions and Co-morbidities  Goal: Patient's chronic conditions and co-morbidity symptoms are monitored and maintained or improved  Outcome: Not Progressing     Problem: Pain  Goal: Verbalizes/displays adequate comfort level or baseline comfort level  Outcome: Not Progressing     Problem: Neurosensory - Adult  Goal: Achieves stable or improved neurological status  Outcome: Not Progressing  Patient unable to participate in care planning. No family at bedside.

## 2023-11-27 ENCOUNTER — APPOINTMENT (OUTPATIENT)
Dept: GENERAL RADIOLOGY | Age: 73
DRG: 163 | End: 2023-11-27
Attending: OTOLARYNGOLOGY
Payer: MEDICARE

## 2023-11-27 LAB
ANION GAP SERPL CALC-SCNC: 10 MEQ/L (ref 8–16)
BASOPHILS ABSOLUTE: 0 THOU/MM3 (ref 0–0.1)
BASOPHILS NFR BLD AUTO: 0.3 %
BUN SERPL-MCNC: 34 MG/DL (ref 7–22)
CALCIUM SERPL-MCNC: 9.4 MG/DL (ref 8.5–10.5)
CHLORIDE SERPL-SCNC: 97 MEQ/L (ref 98–111)
CO2 SERPL-SCNC: 32 MEQ/L (ref 23–33)
CREAT SERPL-MCNC: 0.6 MG/DL (ref 0.4–1.2)
DEPRECATED RDW RBC AUTO: 50.7 FL (ref 35–45)
EOSINOPHIL NFR BLD AUTO: 1.3 %
EOSINOPHILS ABSOLUTE: 0.1 THOU/MM3 (ref 0–0.4)
ERYTHROCYTE [DISTWIDTH] IN BLOOD BY AUTOMATED COUNT: 15 % (ref 11.5–14.5)
GFR SERPL CREATININE-BSD FRML MDRD: > 60 ML/MIN/1.73M2
GLUCOSE BLD STRIP.AUTO-MCNC: 106 MG/DL (ref 70–108)
GLUCOSE BLD STRIP.AUTO-MCNC: 120 MG/DL (ref 70–108)
GLUCOSE BLD STRIP.AUTO-MCNC: 124 MG/DL (ref 70–108)
GLUCOSE BLD STRIP.AUTO-MCNC: 143 MG/DL (ref 70–108)
GLUCOSE BLD STRIP.AUTO-MCNC: 146 MG/DL (ref 70–108)
GLUCOSE SERPL-MCNC: 139 MG/DL (ref 70–108)
HCT VFR BLD AUTO: 29 % (ref 37–47)
HGB BLD-MCNC: 9.1 GM/DL (ref 12–16)
IMM GRANULOCYTES # BLD AUTO: 0.03 THOU/MM3 (ref 0–0.07)
IMM GRANULOCYTES NFR BLD AUTO: 0.4 %
LYMPHOCYTES ABSOLUTE: 1.1 THOU/MM3 (ref 1–4.8)
LYMPHOCYTES NFR BLD AUTO: 15.3 %
MCH RBC QN AUTO: 28.7 PG (ref 26–33)
MCHC RBC AUTO-ENTMCNC: 31.4 GM/DL (ref 32.2–35.5)
MCV RBC AUTO: 91.5 FL (ref 81–99)
MONOCYTES ABSOLUTE: 0.5 THOU/MM3 (ref 0.4–1.3)
MONOCYTES NFR BLD AUTO: 7.7 %
NEUTROPHILS NFR BLD AUTO: 75 %
NRBC BLD AUTO-RTO: 0 /100 WBC
PLATELET # BLD AUTO: 229 THOU/MM3 (ref 130–400)
PMV BLD AUTO: 10.5 FL (ref 9.4–12.4)
POTASSIUM SERPL-SCNC: 4.2 MEQ/L (ref 3.5–5.2)
RBC # BLD AUTO: 3.17 MILL/MM3 (ref 4.2–5.4)
SEGMENTED NEUTROPHILS ABSOLUTE COUNT: 5.3 THOU/MM3 (ref 1.8–7.7)
SODIUM SERPL-SCNC: 139 MEQ/L (ref 135–145)
WBC # BLD AUTO: 7 THOU/MM3 (ref 4.8–10.8)

## 2023-11-27 PROCEDURE — 6370000000 HC RX 637 (ALT 250 FOR IP): Performed by: NURSE PRACTITIONER

## 2023-11-27 PROCEDURE — 99291 CRITICAL CARE FIRST HOUR: CPT | Performed by: INTERNAL MEDICINE

## 2023-11-27 PROCEDURE — 2700000000 HC OXYGEN THERAPY PER DAY

## 2023-11-27 PROCEDURE — 2580000003 HC RX 258: Performed by: NURSE PRACTITIONER

## 2023-11-27 PROCEDURE — 80048 BASIC METABOLIC PNL TOTAL CA: CPT

## 2023-11-27 PROCEDURE — 94003 VENT MGMT INPAT SUBQ DAY: CPT

## 2023-11-27 PROCEDURE — 99024 POSTOP FOLLOW-UP VISIT: CPT | Performed by: REGISTERED NURSE

## 2023-11-27 PROCEDURE — 74018 RADEX ABDOMEN 1 VIEW: CPT

## 2023-11-27 PROCEDURE — 2000000000 HC ICU R&B

## 2023-11-27 PROCEDURE — 94761 N-INVAS EAR/PLS OXIMETRY MLT: CPT

## 2023-11-27 PROCEDURE — 6370000000 HC RX 637 (ALT 250 FOR IP): Performed by: REGISTERED NURSE

## 2023-11-27 PROCEDURE — 82948 REAGENT STRIP/BLOOD GLUCOSE: CPT

## 2023-11-27 PROCEDURE — 85025 COMPLETE CBC W/AUTO DIFF WBC: CPT

## 2023-11-27 PROCEDURE — 6370000000 HC RX 637 (ALT 250 FOR IP): Performed by: INTERNAL MEDICINE

## 2023-11-27 PROCEDURE — 36415 COLL VENOUS BLD VENIPUNCTURE: CPT

## 2023-11-27 PROCEDURE — 6360000002 HC RX W HCPCS: Performed by: INTERNAL MEDICINE

## 2023-11-27 RX ORDER — OXYMETAZOLINE HYDROCHLORIDE 0.05 G/100ML
3 SPRAY NASAL 3 TIMES DAILY
Status: COMPLETED | OUTPATIENT
Start: 2023-11-27 | End: 2023-11-29

## 2023-11-27 RX ORDER — BUMETANIDE 0.5 MG/1
0.5 TABLET ORAL DAILY
Status: DISPENSED | OUTPATIENT
Start: 2023-11-27

## 2023-11-27 RX ADMIN — FAMOTIDINE 20 MG: 20 TABLET ORAL at 20:34

## 2023-11-27 RX ADMIN — BUMETANIDE 0.5 MG: 0.5 TABLET ORAL at 08:51

## 2023-11-27 RX ADMIN — CHLORHEXIDINE GLUCONATE 0.12% ORAL RINSE 15 ML: 1.2 LIQUID ORAL at 20:26

## 2023-11-27 RX ADMIN — FOLIC ACID 1 MG: 1 TABLET ORAL at 08:52

## 2023-11-27 RX ADMIN — ENOXAPARIN SODIUM 80 MG: 100 INJECTION SUBCUTANEOUS at 08:51

## 2023-11-27 RX ADMIN — CHLORHEXIDINE GLUCONATE 0.12% ORAL RINSE 15 ML: 1.2 LIQUID ORAL at 10:35

## 2023-11-27 RX ADMIN — LEVETIRACETAM 500 MG: 500 TABLET, FILM COATED ORAL at 08:52

## 2023-11-27 RX ADMIN — SODIUM CHLORIDE, PRESERVATIVE FREE 30 ML: 5 INJECTION INTRAVENOUS at 20:27

## 2023-11-27 RX ADMIN — SODIUM CHLORIDE, PRESERVATIVE FREE 10 ML: 5 INJECTION INTRAVENOUS at 08:52

## 2023-11-27 RX ADMIN — MICONAZOLE NITRATE: 2 POWDER TOPICAL at 08:52

## 2023-11-27 RX ADMIN — LEVETIRACETAM 500 MG: 500 TABLET, FILM COATED ORAL at 22:22

## 2023-11-27 RX ADMIN — OXYMETAZOLINE HCL 3 SPRAY: 0.05 SPRAY NASAL at 20:29

## 2023-11-27 RX ADMIN — OXYMETAZOLINE HCL 3 SPRAY: 0.05 SPRAY NASAL at 14:55

## 2023-11-27 RX ADMIN — FAMOTIDINE 20 MG: 20 TABLET ORAL at 08:51

## 2023-11-27 RX ADMIN — INSULIN GLARGINE 30 UNITS: 100 INJECTION, SOLUTION SUBCUTANEOUS at 20:33

## 2023-11-27 RX ADMIN — OXYMETAZOLINE HCL 3 SPRAY: 0.05 SPRAY NASAL at 10:35

## 2023-11-27 RX ADMIN — MODAFINIL 200 MG: 100 TABLET ORAL at 08:51

## 2023-11-27 RX ADMIN — ENOXAPARIN SODIUM 80 MG: 100 INJECTION SUBCUTANEOUS at 20:33

## 2023-11-27 RX ADMIN — MICONAZOLE NITRATE: 2 POWDER TOPICAL at 20:27

## 2023-11-27 ASSESSMENT — PAIN SCALES - GENERAL
PAINLEVEL_OUTOF10: 0
PAINLEVEL_OUTOF10: 0

## 2023-11-27 ASSESSMENT — PULMONARY FUNCTION TESTS
PIF_VALUE: 16
PIF_VALUE: 15
PIF_VALUE: 16

## 2023-11-27 NOTE — PLAN OF CARE
Problem: ABCDS Injury Assessment  Goal: Absence of physical injury  Outcome: Progressing  Flowsheets (Taken 11/27/2023 0530)  Absence of Physical Injury: Implement safety measures based on patient assessment     Problem: Musculoskeletal - Adult  Goal: Maintain proper alignment of affected body part  Outcome: Progressing  Flowsheets (Taken 11/26/2023 2100)  Maintain proper alignment of affected body part: Support and protect limb and body alignment per provider's orders     Problem: Discharge Planning  Goal: Discharge to home or other facility with appropriate resources  Outcome: Progressing  Flowsheets (Taken 11/26/2023 2100)  Discharge to home or other facility with appropriate resources:   Identify barriers to discharge with patient and caregiver   Arrange for needed discharge resources and transportation as appropriate   Identify discharge learning needs (meds, wound care, etc)   Refer to discharge planning if patient needs post-hospital services based on physician order or complex needs related to functional status, cognitive ability or social support system     Problem: Chronic Conditions and Co-morbidities  Goal: Patient's chronic conditions and co-morbidity symptoms are monitored and maintained or improved  Outcome: Progressing  Flowsheets (Taken 11/26/2023 2100)  Care Plan - Patient's Chronic Conditions and Co-Morbidity Symptoms are Monitored and Maintained or Improved:   Monitor and assess patient's chronic conditions and comorbid symptoms for stability, deterioration, or improvement   Collaborate with multidisciplinary team to address chronic and comorbid conditions and prevent exacerbation or deterioration   Update acute care plan with appropriate goals if chronic or comorbid symptoms are exacerbated and prevent overall improvement and discharge     Problem: Safety - Adult  Goal: Free from fall injury  Outcome: Progressing  Flowsheets (Taken 11/27/2023 0530)  Free From Fall Injury: Instruct of physical injury  Outcome: Progressing  Flowsheets (Taken 11/27/2023 0530)  Absence of Physical Injury: Implement safety measures based on patient assessment     Problem: Musculoskeletal - Adult  Goal: Maintain proper alignment of affected body part  Outcome: Progressing  Flowsheets (Taken 11/26/2023 2100)  Maintain proper alignment of affected body part: Support and protect limb and body alignment per provider's orders     Problem: Musculoskeletal - Adult  Goal: Maintain proper alignment of affected body part  Recent Flowsheet Documentation  Taken 11/26/2023 2100 by [facility]  Maintain proper alignment of affected body part: Support and protect limb and body alignment per provider's orders     Problem: Gastrointestinal - Adult  Goal: Minimal or absence of nausea and vomiting  Recent Flowsheet Documentation  Taken 11/26/2023 2100 by [facility]  Minimal or absence of nausea and vomiting:   Administer IV fluids as ordered to ensure adequate hydration   Nasogastric tube to low intermittent suction as ordered   Provide nonpharmacologic comfort measures as appropriate   Advance diet as tolerated, if ordered     Problem: Confusion  Goal: Confusion, delirium, dementia, or psychosis is improved or at baseline  Description: INTERVENTIONS:  1. Assess for possible contributors to thought disturbance, including medications, impaired vision or hearing, underlying metabolic abnormalities, dehydration, psychiatric diagnoses, and notify attending LIP  2. [place] high risk fall precautions, as indicated  3. Provide frequent short contacts to provide reality reorientation, refocusing and direction  4. Decrease environmental stimuli, including noise as appropriate  5. Monitor and intervene to maintain adequate nutrition, hydration, elimination, sleep and activity  6. If unable to ensure safety without constant attention obtain sitter and review sitter guidelines with assigned personnel  7.  Initiate Psychosocial

## 2023-11-27 NOTE — CARE COORDINATION
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 There is also nonocclusive thrombus within the left posterior tibial vein  10/16 Extubated to bipap  10/16 Code Blue  10/16 Reintubated  10/16 Bronch: thick bloody secretions in RLL removed  10/16 CXR: Mild stable cardiomegaly with pulmonary vascular congestion suspicious for congestive heart failure; Prominent pulmonary interstitium suspicious for pulmonary edema  10/17 5 hr EEG: No seizures noted; diffuse background attenuation and suppression without evidence of reactivity; severe encephalopathy  10/17 CT Head: Stable CT appearance the brain. No acute findings  10/17 MRI Brain: No acute findings  48/73 PICC left basilic  80/92 CT Head: No acute findings  10/18 OR: ET Exchange bronhoscopy and lavage  10/19 4hr 40 min EEG: This EEG was abnormal. The background abnormalities indicated a moderate to severe encephalopathy, nonspecific to etiology. The generalized periodic discharges (GPDs) indicated underlying cortical irritability/increased risk of seizures, and can be seen in various encephalopathies. No seizures were recorded  10/19 MRI Brain: No evidence of an acute infarct. No evidence of anoxia; Stable mild severity chronic small vessel ischemic changes; Global volume loss  10/23 4-day EEG: Moderate to severe encephalopathy, improved as recording progressed.  Frequent and at times near continuous runs of GPDs with often typical and at times atypical triphasic morphology, activated with stimulation at the beginning of recording, consistent with SIRPIDs   10/23 BUE Venous Dopplers: There is noncompressibility and absent flow, likely secondary to acute thrombus, in the left axillary, brachial, basilic and cephalic veins. There is partial flow and compressibility in the left subclavian vein. There is noncompressibility and absent flow in the right cephalic and antecubital veins. Remaining vein segments demonstrate normal compressibility and flow  11/2 BUE Venous dopplers: There is acute appearing DVT in the left axillary vein; Chronic appearing DVT is present in the distal left subclavian vein; Superficial thrombophlebitis involving the left basilic vein; Overall this examination has improved since the prior exam dated 10/23/202  11/16 EEG 40 min: abnormal due to disorganized and slow background. Occasional generalized periodic discharges with triphasic morphology. At times there were increased frequency of these discharges with activation consistent with SIRPIDs. No electrographic seizures on this recording  11/16 Suspension Laryngoscopy and Bronchoscopy, with Debridement and steroid injection  11/16 4.5 hr EEG: abnormal.  Diffuse low amplitude polymorphic delta slowing and occasional triphasic waves suggested severe encephalopathy. No abnormal epileptiform activity was seen      Barriers to Discharge: Remains on vent w/ETT on SBT, FIO2 25%, sats 97%. Tmax 99. NSR. Unable to follow commands, CORTEZ x4 to painful stim, decerebrate BUEs. Intensivist and ENT following. Dietitian and Wound Care following. Telemetry, PICC, NG w/TF, werner, SCDs. PO bumex 0.5 mg daily, Peridex, lovenox, pepcid, po folic acid, lantus, SSI, po keppra, desenex, provigil, afrin, Electrolyte replacement protocols. PCP: Zhen Kiran MD  Readmission Risk Score: 19.7%  Patient Goals/Plan/Treatment Preferences: From home with , plans pending progress.  Plan for discussion with family on

## 2023-11-27 NOTE — PROGRESS NOTES
Comprehensive Nutrition Assessment    Type and Reason for Visit:  Reassess (Tube Feed Monitor)    Nutrition Recommendations/Plan:   Tube Feed: Glucerna 1.5 at 40ml/hour as goal - continuous tube feed per Dr Eveline Ellis 11/20  Free H20 flush of 200ml every 6 hours per Dr Eveline Ellis 11/20  Recommend scheduled bowel meds if needed - at one point this admit pt. Went 12 days without a BM     Malnutrition Assessment:  Malnutrition Status: At risk for malnutrition (Comment) (11/20/23 9468)    Context:  Chronic Illness     Findings of the 6 clinical characteristics of malnutrition:  Energy Intake:  No significant decrease in energy intake (pt. tolerating TF since admit)  Weight Loss:  Greater than 10% over 6 months (31# or 16% in 5 months)     Body Fat Loss:  No significant body fat loss     Muscle Mass Loss:  No significant muscle mass loss Temples (temporalis), Clavicles (pectoralis & deltoids)  Fluid Accumulation:  Mild     Strength:  Not Performed    Nutrition Assessment:     Pt. stable from a nutritional standpoint AEB tolerating TF at goal while NPO as intubated. Remains at risk for further nutritional compromise r/t intubated s/p ENT surgery 10/12, admit d/t tracheal stenosis from trach placed 4/2022, complex COVID Hx; s/p code blue 10/16 - anoxic brain injury, increased nutrient needs to aid in wound healing, debridement 11/16, LOS day 46 and underlying medical condition (DM - A1C 5.5% 1/2023, CAD,THR 2/2023).       Nutrition Related Findings:    Pt. Report/Treatments/Miscellaneous: Patient seen, tube feed infusing via NG tube at goal, RN reports tolerating tube feed  GI Status: BM x 2 on 11/26  Pertinent Labs: Sodium 139, Potassium 4.2, BUN 34, Creatinine 0.6, Glucose 106  Pertinent Meds: bumex, folvite, lantus, humalog, keppra, pepcid, prn glycolax     Wound Type: Pressure Injury, Stage II (coccyx; stage 1 buttocks; skin tear abdomen; 10/12 ENT surgery: Cricotracheal Resection, Laryngoplasty with Flap and Vocal Fold Lateralization Stitch Placement)       Current Nutrition Intake & Therapies:    Average Meal Intake: NPO  Average Supplements Intake: NPO  ADULT TUBE FEEDING; Nasogastric; Diabetic; Continuous; 40; No; 200; Q 6 hours  Current Tube Feeding (TF) Orders:  Feeding Route: Nasogastric (with bridle device)  Formula: Diabetic (changed to Glucerna 1.5 11/20 per Dr. Ethan Guzman - Vital 1.2 started by ENT 10/13)  Schedule: Continuous (changed to continuous by Dr. Ethan Guzman 11/20 - started as bolus by ENT 10/13)  Feeding Regimen: Glucerna 1.5 40ml/hour  Additives/Modulars: None  Water Flushes: 200ml water every 6 hours per Dr Ethan Guzman 11/20  Goal TF & Flush Orders Provides: Glucerna 1.5 40ml/hour provides 1440 kcals, 79gms protein, 128gms cho, 15gms fiber, 1529ml free H20 (729 TF, 800 MD flush) in 1760ml total volume (960 TF, 800 MD flush)/24h    Anthropometric Measures:  Height: 157.5 cm (5' 2\")  Ideal Body Weight (IBW): 110 lbs (50 kg)    Admission Body Weight: 76.2 kg (168 lb) (10/12 with facial edema)  Current Body Weight: 78.3 kg (172 lb 9.9 oz) (11/27; +1,+2 pitting edema), 182.8 % IBW. Weight Source: Bed Scale  Current BMI (kg/m2): 31.6  Usual Body Weight: 90.3 kg (199 lb) (5/11/23; 206# 2/28/23)  % Weight Change (Calculated): -15.6                    BMI Categories: Obese Class 1 (BMI 30.0-34. 9)    Estimated Daily Nutrient Needs:  Energy Requirements Based On: Kcal/kg  Weight Used for Energy Requirements: Current (62.5 kg)  Energy (kcal/day): 4077-6794 kcal/day (20-25 kcal/kg)  Weight Used for Protein Requirements: Ideal (50kgm)  Protein (g/day):  g/day (1.4-2 g/kg)  Method Used for Fluid Requirements: Other (Comment)  Fluid (ml/day): per Physician    Nutrition Diagnosis:   Inadequate oral intake related to impaired respiratory function as evidenced by NPO or clear liquid status due to medical condition, intubation, nutrition support - enteral nutrition    Nutrition Interventions:   Food and/or Nutrient Delivery: Continue NPO,

## 2023-11-28 LAB
GLUCOSE BLD STRIP.AUTO-MCNC: 120 MG/DL (ref 70–108)
GLUCOSE BLD STRIP.AUTO-MCNC: 128 MG/DL (ref 70–108)
GLUCOSE BLD STRIP.AUTO-MCNC: 150 MG/DL (ref 70–108)
GLUCOSE BLD STRIP.AUTO-MCNC: 156 MG/DL (ref 70–108)
GLUCOSE BLD STRIP.AUTO-MCNC: 158 MG/DL (ref 70–108)
GLUCOSE BLD STRIP.AUTO-MCNC: 97 MG/DL (ref 70–108)

## 2023-11-28 PROCEDURE — 94003 VENT MGMT INPAT SUBQ DAY: CPT

## 2023-11-28 PROCEDURE — 2580000003 HC RX 258: Performed by: NURSE PRACTITIONER

## 2023-11-28 PROCEDURE — 6360000002 HC RX W HCPCS: Performed by: INTERNAL MEDICINE

## 2023-11-28 PROCEDURE — 6370000000 HC RX 637 (ALT 250 FOR IP): Performed by: NURSE PRACTITIONER

## 2023-11-28 PROCEDURE — 2000000000 HC ICU R&B

## 2023-11-28 PROCEDURE — 94761 N-INVAS EAR/PLS OXIMETRY MLT: CPT

## 2023-11-28 PROCEDURE — 2580000003 HC RX 258: Performed by: INTERNAL MEDICINE

## 2023-11-28 PROCEDURE — 2700000000 HC OXYGEN THERAPY PER DAY

## 2023-11-28 PROCEDURE — 99291 CRITICAL CARE FIRST HOUR: CPT | Performed by: INTERNAL MEDICINE

## 2023-11-28 PROCEDURE — 82948 REAGENT STRIP/BLOOD GLUCOSE: CPT

## 2023-11-28 PROCEDURE — 6370000000 HC RX 637 (ALT 250 FOR IP): Performed by: INTERNAL MEDICINE

## 2023-11-28 PROCEDURE — 51798 US URINE CAPACITY MEASURE: CPT

## 2023-11-28 PROCEDURE — 99024 POSTOP FOLLOW-UP VISIT: CPT | Performed by: PHYSICIAN ASSISTANT

## 2023-11-28 RX ORDER — LEVETIRACETAM 250 MG/1
250 TABLET ORAL 2 TIMES DAILY
Status: DISPENSED | OUTPATIENT
Start: 2023-11-28

## 2023-11-28 RX ADMIN — LEVETIRACETAM 250 MG: 500 TABLET, FILM COATED ORAL at 20:55

## 2023-11-28 RX ADMIN — INSULIN GLARGINE 30 UNITS: 100 INJECTION, SOLUTION SUBCUTANEOUS at 20:54

## 2023-11-28 RX ADMIN — ENOXAPARIN SODIUM 80 MG: 100 INJECTION SUBCUTANEOUS at 20:54

## 2023-11-28 RX ADMIN — SODIUM CHLORIDE, PRESERVATIVE FREE 10 ML: 5 INJECTION INTRAVENOUS at 20:57

## 2023-11-28 RX ADMIN — FAMOTIDINE 20 MG: 20 TABLET ORAL at 20:56

## 2023-11-28 RX ADMIN — ENOXAPARIN SODIUM 80 MG: 100 INJECTION SUBCUTANEOUS at 09:11

## 2023-11-28 RX ADMIN — OXYMETAZOLINE HCL 3 SPRAY: 0.05 SPRAY NASAL at 20:55

## 2023-11-28 RX ADMIN — FOLIC ACID 1 MG: 1 TABLET ORAL at 08:45

## 2023-11-28 RX ADMIN — CHLORHEXIDINE GLUCONATE 0.12% ORAL RINSE 15 ML: 1.2 LIQUID ORAL at 20:54

## 2023-11-28 RX ADMIN — MICONAZOLE NITRATE: 2 POWDER TOPICAL at 20:54

## 2023-11-28 RX ADMIN — OXYMETAZOLINE HCL 3 SPRAY: 0.05 SPRAY NASAL at 08:45

## 2023-11-28 RX ADMIN — CHLORHEXIDINE GLUCONATE 0.12% ORAL RINSE 15 ML: 1.2 LIQUID ORAL at 08:45

## 2023-11-28 RX ADMIN — LEVETIRACETAM 500 MG: 500 TABLET, FILM COATED ORAL at 08:45

## 2023-11-28 RX ADMIN — FAMOTIDINE 20 MG: 20 TABLET ORAL at 09:11

## 2023-11-28 RX ADMIN — MODAFINIL 200 MG: 100 TABLET ORAL at 09:11

## 2023-11-28 RX ADMIN — OXYMETAZOLINE HCL 3 SPRAY: 0.05 SPRAY NASAL at 14:54

## 2023-11-28 RX ADMIN — CEFTRIAXONE SODIUM 1000 MG: 1 INJECTION, POWDER, FOR SOLUTION INTRAMUSCULAR; INTRAVENOUS at 18:04

## 2023-11-28 RX ADMIN — MICONAZOLE NITRATE: 2 POWDER TOPICAL at 08:45

## 2023-11-28 RX ADMIN — SODIUM CHLORIDE, PRESERVATIVE FREE 10 ML: 5 INJECTION INTRAVENOUS at 08:45

## 2023-11-28 RX ADMIN — BUMETANIDE 0.5 MG: 0.5 TABLET ORAL at 08:45

## 2023-11-28 ASSESSMENT — PULMONARY FUNCTION TESTS
PIF_VALUE: 15
PIF_VALUE: 16
PIF_VALUE: 15

## 2023-11-28 NOTE — PLAN OF CARE
Problem: Discharge Planning  Goal: Discharge to home or other facility with appropriate resources  Outcome: Progressing  Flowsheets (Taken 11/28/2023 1037)  Discharge to home or other facility with appropriate resources: Identify barriers to discharge with patient and caregiver     Problem: Chronic Conditions and Co-morbidities  Goal: Patient's chronic conditions and co-morbidity symptoms are monitored and maintained or improved  Outcome: Progressing  Flowsheets (Taken 11/28/2023 1037)  Care Plan - Patient's Chronic Conditions and Co-Morbidity Symptoms are Monitored and Maintained or Improved: Monitor and assess patient's chronic conditions and comorbid symptoms for stability, deterioration, or improvement     Problem: Safety - Adult  Goal: Free from fall injury  Outcome: Progressing  Flowsheets  Taken 11/28/2023 1037 by Arabella Cote RN  Free From Fall Injury: Instruct family/caregiver on patient safety  Taken 11/28/2023 0349 by Luis Carlos Young  Free From Fall Injury: Instruct family/caregiver on patient safety     Problem: Pain  Goal: Verbalizes/displays adequate comfort level or baseline comfort level  Outcome: Progressing  Flowsheets  Taken 11/28/2023 1037  Verbalizes/displays adequate comfort level or baseline comfort level: Encourage patient to monitor pain and request assistance  Taken 11/28/2023 0800  Verbalizes/displays adequate comfort level or baseline comfort level:   Assess pain using appropriate pain scale   Administer analgesics based on type and severity of pain and evaluate response   Implement non-pharmacological measures as appropriate and evaluate response     Problem: Respiratory - Adult  Goal: Normal spontaneous ventilation  11/28/2023 0500 by Kacey Damon RCP  Outcome: Progressing  Goal: Achieves optimal ventilation and oxygenation  11/28/2023 1037 by Arabella Cote RN  Outcome: Progressing  Flowsheets (Taken 11/28/2023 1037)  Achieves optimal ventilation and oxygenation: Assess for

## 2023-11-28 NOTE — PROGRESS NOTES
PT was unresponsive but was blessed.     11/28/23 1544   Encounter Summary   Service Provided For: Patient   Referral/Consult From: [facility]   Last Encounter  11/28/23   Complexity of Encounter Low   Spiritual/Emotional needs   Type Spiritual Support   Assessment/Intervention/Outcome   Assessment Unable to assess

## 2023-11-28 NOTE — PLAN OF CARE
Problem: Respiratory - Adult  Goal: Normal spontaneous ventilation  Outcome: Progressing     Problem: Respiratory - Adult  Goal: Achieves optimal ventilation and oxygenation  Outcome: Progressing                                               Patient Weaning Progress    The patient's vent settings was able to be weaned this shift. Ventilator settings that were weaned              [] Mode   [] Pressure support weaned   [x] Fio2 weaned   [] Peep weaned      Spontaneous weaning trial  was attempted. Pt been on SBT for numerous of days. Unable to get agreement for goals because no family is present and patient cannot respond.

## 2023-11-28 NOTE — PROGRESS NOTES
Patient:  Tanmay Cruz    Unit/Bed:4D-08/008-A  MRN: [id number]   PCP: Gorge Watt MD  Date of Admission: 10/12/2023    Assessment and Plan(All pulmonary edema, renal failure, PE, and respiratory failure diagnoses are acute in nature unless otherwise specified): Anoxic Brain Injury: Highest GCS obtained is 8. Mostly stays at GCS 5. GCS did reach 7 on 11/27/23 which represents an isolated event. GCS 5 remains the baseline. Patient continues with ventilator support to allow time to determine extent of recovery. Injury is severe persistent vegetative state. Multiple EEGs and continuous EEG show no seizure activity. EEG is consistent with severe encephalopathy. MRI and CT head show no structural injury. Patient did not qualify for hypothermia therapy as she had respiratory arrest leading to cardiac arrest.  There is no data to show recovery benefit in this population. She did receive hypertonic saline to minimize risk of cerebral edema keeping serum sodium 145-150. Patient on Provigil for neuro-stimulation. Keppra dosing decreased 11/13/23. On 11/28/23, Keppra dose to 250 mg bid. Look to discontinue Keppra if no seizures for 2 weeks after that. EEG completed 11/16/23 shows ongoing cortical dysfunction. Seems to have reached plateau of neurologic recovery. GCS 5 is the baseline. November 30th is time frame to discuss with family parameters of care. Still watching for any neurologic recovery. .  Acute Respiratory Failure: Intubated with difficulty on 10/16/23 secondary to complete airway collapse with no capacity for air exchange. What air was in the lungs was trapped in the lungs. The amount of laryngeal edema was intense and required a stylette to penetrate the edema and secure the airway. Patient remains on mechanical ventilator for airway protection. Continue with ventilator support. Family reports patient would not want tracheostomy tube again-ever.   No plans for extubation at this time. My discussion with  on 11/17/23 indicates they would consider tracheostomy tube to extend time to recover. Post Cricotracheal resection with laryngoplasty: 10/12/23. Dr. Andi Mcmahon following. Debridement completed 11/16/23. Bilateral DVT: On heparin. Right DVT has resolved on venous doppler completed 11/1/23. Left DVTs persist.  On therapeutic Lovenox. DM II: On Lantus insulin with SSI. Diastolic Heart Failure: Echo 1/2/23 shows EF 60%. On diuretics. HTN: Currently on Bumex. Anemia:Trending H/H. Edema:  Diurese. Nutrition: On TF. GI prophylaxis: Pepcid. Hypotension:  Resolved. .  Tongue biting:  Primarily left sided. Repeat 4 hour EEG on 11/16/23 showed no seizures. Resolved. Wen removed 11/28/23 in favor of external catheter. CC:  Anoxic Brain Injury  HPI: Patient is a 81year old white female nonsmoker. She has a history of CAD with stenting in 2008. She has degenerative arthritis, diabetic neuropathy with bilateral feet numbness at baseline. She has type II DM, previous PE in January 2023, hyperlipidemia, and HTN. Patient has history of COVID-19 with associated respiratory failure requiring intubation and subsequent tracheosotmy tube placement. She developed tracheal stenosis. Repeated laryngoscopy with dilation did not sustain improvement. She was [facility] to [facility] and underwnet cricotracheal resection with laryngoplasty. She failed post-operative extubation due to airway inflammation. She was maintained on mechanical ventilator until extubation trial 10/16. At extubation, she fully collapsed her airway trapping air in the chest.  She could not exchange respirations meaning she could not move air in or out of the airway. She developed respiratory arrest and subsequent cardiac arrest.  Ambu respirations could not penetrate the airway and filled the stomach as airway had acutely collapsed. She was emergently intubated with difficulty.   Her ROSC was

## 2023-11-29 LAB
ANION GAP SERPL CALC-SCNC: 12 MEQ/L (ref 8–16)
BASOPHILS ABSOLUTE: 0 THOU/MM3 (ref 0–0.1)
BASOPHILS NFR BLD AUTO: 0.4 %
BUN SERPL-MCNC: 31 MG/DL (ref 7–22)
CALCIUM SERPL-MCNC: 9.2 MG/DL (ref 8.5–10.5)
CHLORIDE SERPL-SCNC: 97 MEQ/L (ref 98–111)
CO2 SERPL-SCNC: 31 MEQ/L (ref 23–33)
CREAT SERPL-MCNC: 0.6 MG/DL (ref 0.4–1.2)
DEPRECATED RDW RBC AUTO: 49.5 FL (ref 35–45)
EOSINOPHIL NFR BLD AUTO: 1.5 %
EOSINOPHILS ABSOLUTE: 0.1 THOU/MM3 (ref 0–0.4)
ERYTHROCYTE [DISTWIDTH] IN BLOOD BY AUTOMATED COUNT: 14.7 % (ref 11.5–14.5)
GFR SERPL CREATININE-BSD FRML MDRD: > 60 ML/MIN/1.73M2
GLUCOSE BLD STRIP.AUTO-MCNC: 105 MG/DL (ref 70–108)
GLUCOSE BLD STRIP.AUTO-MCNC: 128 MG/DL (ref 70–108)
GLUCOSE BLD STRIP.AUTO-MCNC: 132 MG/DL (ref 70–108)
GLUCOSE BLD STRIP.AUTO-MCNC: 132 MG/DL (ref 70–108)
GLUCOSE BLD STRIP.AUTO-MCNC: 134 MG/DL (ref 70–108)
GLUCOSE SERPL-MCNC: 143 MG/DL (ref 70–108)
HCT VFR BLD AUTO: 30.5 % (ref 37–47)
HGB BLD-MCNC: 9.7 GM/DL (ref 12–16)
IMM GRANULOCYTES # BLD AUTO: 0.05 THOU/MM3 (ref 0–0.07)
IMM GRANULOCYTES NFR BLD AUTO: 0.6 %
LYMPHOCYTES ABSOLUTE: 1.4 THOU/MM3 (ref 1–4.8)
LYMPHOCYTES NFR BLD AUTO: 16.5 %
MCH RBC QN AUTO: 29 PG (ref 26–33)
MCHC RBC AUTO-ENTMCNC: 31.8 GM/DL (ref 32.2–35.5)
MCV RBC AUTO: 91.3 FL (ref 81–99)
MONOCYTES ABSOLUTE: 0.6 THOU/MM3 (ref 0.4–1.3)
MONOCYTES NFR BLD AUTO: 7.1 %
NEUTROPHILS NFR BLD AUTO: 73.9 %
NRBC BLD AUTO-RTO: 0 /100 WBC
PLATELET # BLD AUTO: 256 THOU/MM3 (ref 130–400)
PMV BLD AUTO: 10.4 FL (ref 9.4–12.4)
POTASSIUM SERPL-SCNC: 4.1 MEQ/L (ref 3.5–5.2)
RBC # BLD AUTO: 3.34 MILL/MM3 (ref 4.2–5.4)
SEGMENTED NEUTROPHILS ABSOLUTE COUNT: 6.2 THOU/MM3 (ref 1.8–7.7)
SODIUM SERPL-SCNC: 140 MEQ/L (ref 135–145)
WBC # BLD AUTO: 8.4 THOU/MM3 (ref 4.8–10.8)

## 2023-11-29 PROCEDURE — 99024 POSTOP FOLLOW-UP VISIT: CPT | Performed by: PHYSICIAN ASSISTANT

## 2023-11-29 PROCEDURE — 6360000002 HC RX W HCPCS: Performed by: INTERNAL MEDICINE

## 2023-11-29 PROCEDURE — 2700000000 HC OXYGEN THERAPY PER DAY

## 2023-11-29 PROCEDURE — 6370000000 HC RX 637 (ALT 250 FOR IP): Performed by: NURSE PRACTITIONER

## 2023-11-29 PROCEDURE — 94003 VENT MGMT INPAT SUBQ DAY: CPT

## 2023-11-29 PROCEDURE — 2580000003 HC RX 258: Performed by: NURSE PRACTITIONER

## 2023-11-29 PROCEDURE — 85025 COMPLETE CBC W/AUTO DIFF WBC: CPT

## 2023-11-29 PROCEDURE — 82948 REAGENT STRIP/BLOOD GLUCOSE: CPT

## 2023-11-29 PROCEDURE — 2000000000 HC ICU R&B

## 2023-11-29 PROCEDURE — 6370000000 HC RX 637 (ALT 250 FOR IP): Performed by: INTERNAL MEDICINE

## 2023-11-29 PROCEDURE — 99291 CRITICAL CARE FIRST HOUR: CPT | Performed by: INTERNAL MEDICINE

## 2023-11-29 PROCEDURE — 36415 COLL VENOUS BLD VENIPUNCTURE: CPT

## 2023-11-29 PROCEDURE — 80048 BASIC METABOLIC PNL TOTAL CA: CPT

## 2023-11-29 PROCEDURE — 94761 N-INVAS EAR/PLS OXIMETRY MLT: CPT

## 2023-11-29 PROCEDURE — 2580000003 HC RX 258: Performed by: INTERNAL MEDICINE

## 2023-11-29 RX ADMIN — FOLIC ACID 1 MG: 1 TABLET ORAL at 08:14

## 2023-11-29 RX ADMIN — MODAFINIL 200 MG: 100 TABLET ORAL at 08:14

## 2023-11-29 RX ADMIN — CHLORHEXIDINE GLUCONATE 0.12% ORAL RINSE 15 ML: 1.2 LIQUID ORAL at 19:41

## 2023-11-29 RX ADMIN — LEVETIRACETAM 250 MG: 500 TABLET, FILM COATED ORAL at 08:14

## 2023-11-29 RX ADMIN — OXYMETAZOLINE HCL 3 SPRAY: 0.05 SPRAY NASAL at 08:14

## 2023-11-29 RX ADMIN — BUMETANIDE 0.5 MG: 0.5 TABLET ORAL at 08:14

## 2023-11-29 RX ADMIN — FAMOTIDINE 20 MG: 20 TABLET ORAL at 19:42

## 2023-11-29 RX ADMIN — OXYMETAZOLINE HCL 3 SPRAY: 0.05 SPRAY NASAL at 15:57

## 2023-11-29 RX ADMIN — SODIUM CHLORIDE, PRESERVATIVE FREE 10 ML: 5 INJECTION INTRAVENOUS at 19:44

## 2023-11-29 RX ADMIN — CEFTRIAXONE SODIUM 1000 MG: 1 INJECTION, POWDER, FOR SOLUTION INTRAMUSCULAR; INTRAVENOUS at 17:29

## 2023-11-29 RX ADMIN — INSULIN GLARGINE 30 UNITS: 100 INJECTION, SOLUTION SUBCUTANEOUS at 19:41

## 2023-11-29 RX ADMIN — SODIUM CHLORIDE, PRESERVATIVE FREE 10 ML: 5 INJECTION INTRAVENOUS at 08:14

## 2023-11-29 RX ADMIN — MICONAZOLE NITRATE: 2 POWDER TOPICAL at 08:13

## 2023-11-29 RX ADMIN — MICONAZOLE NITRATE: 2 POWDER TOPICAL at 19:43

## 2023-11-29 RX ADMIN — ENOXAPARIN SODIUM 80 MG: 100 INJECTION SUBCUTANEOUS at 08:14

## 2023-11-29 RX ADMIN — OXYMETAZOLINE HCL 3 SPRAY: 0.05 SPRAY NASAL at 19:42

## 2023-11-29 RX ADMIN — FAMOTIDINE 20 MG: 20 TABLET ORAL at 08:14

## 2023-11-29 RX ADMIN — CHLORHEXIDINE GLUCONATE 0.12% ORAL RINSE 15 ML: 1.2 LIQUID ORAL at 08:13

## 2023-11-29 RX ADMIN — ENOXAPARIN SODIUM 80 MG: 100 INJECTION SUBCUTANEOUS at 19:41

## 2023-11-29 RX ADMIN — LEVETIRACETAM 250 MG: 500 TABLET, FILM COATED ORAL at 21:40

## 2023-11-29 ASSESSMENT — PULMONARY FUNCTION TESTS
PIF_VALUE: 15

## 2023-11-29 NOTE — PLAN OF CARE
Problem: Respiratory - Adult  Goal: Normal spontaneous ventilation  Outcome: Progressing     Problem: Neurosensory - Adult  Goal: Achieves stable or improved neurological status  11/29/2023 0425 by Mary Grace Gates RN  Outcome: Not Progressing  Flowsheets (Taken 11/28/2023 1037 by Indigo Palacios RN)  Achieves stable or improved neurological status: Assess for and report changes in neurological status                                                Patient Weaning Progress    The patient's vent settings was not able to be weaned this shift. Ventilator settings that were weaned              [] Mode   [] Pressure support weaned   [] Fio2 weaned   [] Peep weaned      Spontaneous weaning trial attempted.      PT on SBT for past numerous days

## 2023-11-29 NOTE — CARE COORDINATION
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 There is also nonocclusive thrombus within the left posterior tibial vein  10/16 Extubated to bipap  10/16 Code Blue  10/16 Reintubated  10/16 Bronch: thick bloody secretions in RLL removed  10/16 CXR: Mild stable cardiomegaly with pulmonary vascular congestion suspicious for congestive heart failure; Prominent pulmonary interstitium suspicious for pulmonary edema  10/17 5 hr EEG: No seizures noted; diffuse background attenuation and suppression without evidence of reactivity; severe encephalopathy  10/17 CT Head: Stable CT appearance the brain. No acute findings  10/17 MRI Brain: No acute findings  42/92 PICC left basilic  28/46 CT Head: No acute findings  10/18 OR: ET Exchange bronhoscopy and lavage  10/19 4hr 40 min EEG: This EEG was abnormal. The background abnormalities indicated a moderate to severe encephalopathy, nonspecific to etiology. The generalized periodic discharges (GPDs) indicated underlying cortical irritability/increased risk of seizures, and can be seen in various encephalopathies. No seizures were recorded  10/19 MRI Brain: No evidence of an acute infarct. No evidence of anoxia; Stable mild severity chronic small vessel ischemic changes; Global volume loss  10/23 4-day EEG: Moderate to severe encephalopathy, improved as recording progressed.  Frequent and at times near continuous runs of GPDs with often typical and at times atypical triphasic Preferences:  From home with , plans pending progress. Plan for discussion with family on 11/30 regarding parameters of care.

## 2023-11-29 NOTE — PLAN OF CARE
Problem: Respiratory - Adult  Goal: Normal spontaneous ventilation  11/29/2023 1002 by Joanne Vidales RCP  Outcome: Not Progressing  Patient remains on the ventilator, continuing to wean as tolerated to maintain target tidal volumes, respiratory rate and SpO2. Patient Weaning Progress    The patient's vent settings was not able to be weaned this shift. Ventilator settings that were weaned              [] Mode   [] Pressure support weaned   [] Fio2 weaned   [] Peep weaned      Spontaneous weaning trial  was attempted. *Results of SBT documented under SBTOUTCOME note. Reason that defined ventilator parameters for SBT was not met              [] Patient condition requires increased ventilator settings  [] Requires increased sedation   [] Settings not within weaning range   [] SAT not completed   [] Physician orders    Unable to get agreement for goals because no family is present and patient cannot respond.

## 2023-11-29 NOTE — PLAN OF CARE
Problem: Neurosensory - Adult  Goal: Achieves stable or improved neurological status  Outcome: Not Progressing  Flowsheets (Taken 11/28/2023 1037 by Negrito iSngh RN)  Achieves stable or improved neurological status: Assess for and report changes in neurological status     Problem: Discharge Planning  Goal: Discharge to home or other facility with appropriate resources  Outcome: Progressing  Flowsheets (Taken 11/28/2023 1037 by Negrito Singh RN)  Discharge to home or other facility with appropriate resources: Identify barriers to discharge with patient and caregiver     Problem: Chronic Conditions and Co-morbidities  Goal: Patient's chronic conditions and co-morbidity symptoms are monitored and maintained or improved  Outcome: Progressing  Flowsheets (Taken 11/28/2023 1037 by Negrito Singh RN)  Care Plan - Patient's Chronic Conditions and Co-Morbidity Symptoms are Monitored and Maintained or Improved: Monitor and assess patient's chronic conditions and comorbid symptoms for stability, deterioration, or improvement     Problem: Respiratory - Adult  Goal: Achieves optimal ventilation and oxygenation  Outcome: Progressing  Flowsheets (Taken 11/28/2023 1037 by Negrito Singh RN)  Achieves optimal ventilation and oxygenation: Assess for changes in respiratory status     Problem: Neurosensory - Adult  Goal: Absence of seizures  Outcome: Progressing  Flowsheets (Taken 11/28/2023 1037 by Negrito Singh RN)  Absence of seizures: Monitor for seizure activity. If seizure occurs, document type and location of movements and any associated apnea     Problem: Skin/Tissue Integrity  Goal: Absence of new skin breakdown  Description: 1. Monitor for areas of redness and/or skin breakdown  2. Assess vascular access sites hourly  3. Every 4-6 hours minimum:  Change oxygen saturation probe site  4.   Every 4-6 hours:  If on nasal continuous positive airway pressure, respiratory therapy assess nares and determine need for

## 2023-11-29 NOTE — PROGRESS NOTES
Patient:  Dayne Mccarthy    Unit/Bed:4D-08/008-A  MRN: [id number]   PCP: Priya Helton MD  Date of Admission: 10/12/2023    Assessment and Plan(All pulmonary edema, renal failure, PE, and respiratory failure diagnoses are acute in nature unless otherwise specified): Anoxic Brain Injury: Highest GCS obtained is 8. Mostly stays at GCS 5. GCS did reach 7 on 11/27/23 which represents an isolated event. GCS 5 remains the baseline. Patient continues with ventilator support to allow time to determine extent of recovery. Injury is severe persistent vegetative state. Multiple EEGs and continuous EEG show no seizure activity. EEG is consistent with severe encephalopathy. MRI and CT head show no structural injury. Patient did not qualify for hypothermia therapy as she had respiratory arrest leading to cardiac arrest.  There is no data to show recovery benefit in this population. She did receive hypertonic saline to minimize risk of cerebral edema keeping serum sodium 145-150. Patient on Provigil for neuro-stimulation. On 11/28/23, Keppra dose to 250 mg bid. Look to discontinue Keppra if no seizures for 2 weeks after that. EEG completed 11/16/23 shows ongoing cortical dysfunction. Seems to have reached plateau of neurologic recovery. GCS 5 is the baseline. Nurses documenting GCS 7.  Will assess to see if this level is sustained. Acute Respiratory Failure: Intubated with difficulty on 10/16/23 secondary to complete airway collapse with no capacity for air exchange. What air was in the lungs was trapped in the lungs. The amount of laryngeal edema was intense and required a stylette to penetrate the edema and secure the airway. Patient remains on mechanical ventilator for airway protection. Continue with ventilator support. Family reports patient would not want tracheostomy tube again-ever. No plans for extubation at this time.  My discussion with  on 11/17/23 indicates they would consider

## 2023-11-30 LAB
GLUCOSE BLD STRIP.AUTO-MCNC: 120 MG/DL (ref 70–108)
GLUCOSE BLD STRIP.AUTO-MCNC: 128 MG/DL (ref 70–108)
GLUCOSE BLD STRIP.AUTO-MCNC: 137 MG/DL (ref 70–108)
GLUCOSE BLD STRIP.AUTO-MCNC: 91 MG/DL (ref 70–108)

## 2023-11-30 PROCEDURE — 2580000003 HC RX 258: Performed by: INTERNAL MEDICINE

## 2023-11-30 PROCEDURE — 6370000000 HC RX 637 (ALT 250 FOR IP): Performed by: NURSE PRACTITIONER

## 2023-11-30 PROCEDURE — 82948 REAGENT STRIP/BLOOD GLUCOSE: CPT

## 2023-11-30 PROCEDURE — 2000000000 HC ICU R&B

## 2023-11-30 PROCEDURE — 99024 POSTOP FOLLOW-UP VISIT: CPT | Performed by: PHYSICIAN ASSISTANT

## 2023-11-30 PROCEDURE — 6360000002 HC RX W HCPCS: Performed by: INTERNAL MEDICINE

## 2023-11-30 PROCEDURE — 94761 N-INVAS EAR/PLS OXIMETRY MLT: CPT

## 2023-11-30 PROCEDURE — 2700000000 HC OXYGEN THERAPY PER DAY

## 2023-11-30 PROCEDURE — 2580000003 HC RX 258: Performed by: NURSE PRACTITIONER

## 2023-11-30 PROCEDURE — 6370000000 HC RX 637 (ALT 250 FOR IP): Performed by: INTERNAL MEDICINE

## 2023-11-30 PROCEDURE — 94003 VENT MGMT INPAT SUBQ DAY: CPT

## 2023-11-30 RX ADMIN — ENOXAPARIN SODIUM 80 MG: 100 INJECTION SUBCUTANEOUS at 22:48

## 2023-11-30 RX ADMIN — ENOXAPARIN SODIUM 80 MG: 100 INJECTION SUBCUTANEOUS at 07:56

## 2023-11-30 RX ADMIN — FAMOTIDINE 20 MG: 20 TABLET ORAL at 22:48

## 2023-11-30 RX ADMIN — CEFTRIAXONE SODIUM 1000 MG: 1 INJECTION, POWDER, FOR SOLUTION INTRAMUSCULAR; INTRAVENOUS at 18:18

## 2023-11-30 RX ADMIN — FOLIC ACID 1 MG: 1 TABLET ORAL at 07:56

## 2023-11-30 RX ADMIN — BUMETANIDE 0.5 MG: 0.5 TABLET ORAL at 07:56

## 2023-11-30 RX ADMIN — FAMOTIDINE 20 MG: 20 TABLET ORAL at 07:56

## 2023-11-30 RX ADMIN — MODAFINIL 200 MG: 100 TABLET ORAL at 07:56

## 2023-11-30 RX ADMIN — SODIUM CHLORIDE, PRESERVATIVE FREE 10 ML: 5 INJECTION INTRAVENOUS at 07:57

## 2023-11-30 RX ADMIN — LEVETIRACETAM 250 MG: 500 TABLET, FILM COATED ORAL at 07:56

## 2023-11-30 RX ADMIN — MICONAZOLE NITRATE: 2 POWDER TOPICAL at 22:38

## 2023-11-30 RX ADMIN — CHLORHEXIDINE GLUCONATE 0.12% ORAL RINSE 15 ML: 1.2 LIQUID ORAL at 22:48

## 2023-11-30 RX ADMIN — CHLORHEXIDINE GLUCONATE 0.12% ORAL RINSE 15 ML: 1.2 LIQUID ORAL at 07:57

## 2023-11-30 RX ADMIN — LEVETIRACETAM 250 MG: 500 TABLET, FILM COATED ORAL at 22:43

## 2023-11-30 RX ADMIN — INSULIN GLARGINE 30 UNITS: 100 INJECTION, SOLUTION SUBCUTANEOUS at 22:49

## 2023-11-30 RX ADMIN — MICONAZOLE NITRATE: 2 POWDER TOPICAL at 07:56

## 2023-11-30 ASSESSMENT — PAIN SCALES - WONG BAKER
WONGBAKER_NUMERICALRESPONSE: 0

## 2023-11-30 ASSESSMENT — PAIN SCALES - GENERAL: PAINLEVEL_OUTOF10: 0

## 2023-11-30 ASSESSMENT — PULMONARY FUNCTION TESTS
PIF_VALUE: 16
PIF_VALUE: 16
PIF_VALUE: 15

## 2023-11-30 NOTE — PLAN OF CARE
Problem: Chronic Conditions and Co-morbidities  Goal: Patient's chronic conditions and co-morbidity symptoms are monitored and maintained or improved  11/30/2023 0947 by Ewa Geiger RN  Outcome: Progressing  Flowsheets (Taken 11/30/2023 0845)  Care Plan - Patient's Chronic Conditions and Co-Morbidity Symptoms are Monitored and Maintained or Improved: Monitor and assess patient's chronic conditions and comorbid symptoms for stability, deterioration, or improvement     Problem: Safety - Adult  Goal: Free from fall injury  11/30/2023 0947 by Ewa Geiger RN  Outcome: Progressing     Problem: Respiratory - Adult  Goal: Achieves optimal ventilation and oxygenation  11/30/2023 0947 by Ewa Geiger RN  Outcome: Progressing     Problem: Nutrition Deficit:  Goal: Optimize nutritional status  11/30/2023 0947 by Ewa Geiger RN  Outcome: Progressing     Problem: Skin/Tissue Integrity  Goal: Absence of new skin breakdown  Description: 1. Monitor for areas of redness and/or skin breakdown  2. Assess vascular access sites hourly  3. Every 4-6 hours minimum:  Change oxygen saturation probe site  4. Every 4-6 hours:  If on nasal continuous positive airway pressure, respiratory therapy assess nares and determine need for appliance change or resting period. 11/30/2023 0947 by Ewa Geiger RN  Outcome: Progressing     Problem: Neurosensory - Adult  Goal: Achieves stable or improved neurological status  11/30/2023 0947 by Ewa Geiger RN  Outcome: Progressing  Flowsheets (Taken 11/30/2023 0845)  Achieves stable or improved neurological status: Assess for and report changes in neurological status     Problem: Neurosensory - Adult  Goal: Absence of seizures  11/30/2023 0947 by Ewa Geiger RN  Outcome: Progressing  Flowsheets (Taken 11/30/2023 0845)  Absence of seizures: Monitor for seizure activity.   If seizure occurs, document type and location of movements and any associated apnea     Problem: Skin/Tissue Progressing     Problem: Musculoskeletal - Adult  Goal: Maintain proper alignment of affected body part  11/30/2023 0947 by Armani Varghese RN  Outcome: Progressing  Flowsheets (Taken 11/30/2023 0845)  Maintain proper alignment of affected body part: Support and protect limb and body alignment per provider's orders     Problem: Musculoskeletal - Adult  Goal: Maintain proper alignment of affected body part  Recent Flowsheet Documentation  Taken 11/30/2023 0845 by Armani Varghese RN  Maintain proper alignment of affected body part: Support and protect limb and body alignment per provider's orders     Problem: Gastrointestinal - Adult  Goal: Minimal or absence of nausea and vomiting  Recent Flowsheet Documentation  Taken 11/30/2023 0845 by Armani Varghese RN  Minimal or absence of nausea and vomiting: Administer IV fluids as ordered to ensure adequate hydration     Problem: Confusion  Goal: Confusion, delirium, dementia, or psychosis is improved or at baseline  Description: INTERVENTIONS:  1. Assess for possible contributors to thought disturbance, including medications, impaired vision or hearing, underlying metabolic abnormalities, dehydration, psychiatric diagnoses, and notify attending LIP  2. [place] high risk fall precautions, as indicated  3. Provide frequent short contacts to provide reality reorientation, refocusing and direction  4. Decrease environmental stimuli, including noise as appropriate  5. Monitor and intervene to maintain adequate nutrition, hydration, elimination, sleep and activity  6. If unable to ensure safety without constant attention obtain sitter and review sitter guidelines with assigned personnel  7.  Initiate Psychosocial CNS and Spiritual Care consult, as indicated  Recent Flowsheet Documentation  Taken 11/30/2023 0845 by Armani Varghese RN  Effect of thought disturbance (confusion, delirium, dementia, or psychosis) are managed with adequate functional status: Assess for contributors to thought disturbance, including medications, impaired vision or hearing, underlying metabolic abnormalities, dehydration, psychiatric diagnoses, notify LIP     Problem: Discharge Planning  Goal: Discharge to home or other facility with appropriate resources  Recent Flowsheet Documentation  Taken 11/30/2023 0845 by Mya Vieyra RN  Discharge to home or other facility with appropriate resources: Arrange for needed discharge resources and transportation as appropriate  11/30/2023 0046 by Chano Powers RN  Outcome: Not Progressing  Flowsheets (Taken 11/29/2023 1900)  Discharge to home or other facility with appropriate resources:   Arrange for needed discharge resources and transportation as appropriate   Identify barriers to discharge with patient and caregiver   Arrange for interpreters to assist at discharge as needed     Problem: Chronic Conditions and Co-morbidities  Goal: Patient's chronic conditions and co-morbidity symptoms are monitored and maintained or improved  11/30/2023 0947 by Mya Vieyra RN  Outcome: Progressing  Flowsheets (Taken 11/30/2023 0845)  Care Plan - Patient's Chronic Conditions and Co-Morbidity Symptoms are Monitored and Maintained or Improved: Monitor and assess patient's chronic conditions and comorbid symptoms for stability, deterioration, or improvement  11/30/2023 0046 by Chano Powers RN  Outcome: Not Progressing  Flowsheets (Taken 11/29/2023 1900)  Care Plan - Patient's Chronic Conditions and Co-Morbidity Symptoms are Monitored and Maintained or Improved:   Collaborate with multidisciplinary team to address chronic and comorbid conditions and prevent exacerbation or deterioration   Monitor and assess patient's chronic conditions and comorbid symptoms for stability, deterioration, or improvement   Update acute care plan with appropriate goals if chronic or comorbid symptoms are exacerbated and prevent overall improvement and discharge     Problem: Neurosensory - Adult  Goal:

## 2023-11-30 NOTE — PLAN OF CARE
Problem: Respiratory - Adult  Goal: Normal spontaneous ventilation  Outcome: Progressing     Problem: Respiratory - Adult  Goal: Achieves optimal ventilation and oxygenation  11/30/2023 0511 by Alin Vazquez RCP  Outcome: Progressing                                                Patient Weaning Progress    The patient's vent settings was not able to be weaned this shift. Ventilator settings that were weaned              [] Mode   [] Pressure support weaned   [] Fio2 weaned   [] Peep weaned      Spontaneous weaning trial is ongoing. The patient was able to tolerate SBT. RSBI  was 28.55 with EtCO2 of 43 and SpO2 of 98 on 21% FiO2. Spontanteous VT was 452 and RR 12 breaths/min. The patient was on SBT since 11/18 @ 0509 tolerating well. Evac tube was  hooked up with continuous low suction(20-30mmHg)      Cuff was not  deflated to determine cuff leak. A leak  was not detected. Unable to get agreement for goals because no family is present and patient cannot respond.

## 2023-11-30 NOTE — PROGRESS NOTES
Patient:  Chaitanya Gould    Unit/Bed:4D-08/008-A  MRN: [id number]   PCP: Andrea Aden MD  Date of Admission: 10/12/2023    Assessment and Plan(All pulmonary edema, renal failure, PE, and respiratory failure diagnoses are acute in nature unless otherwise specified): Anoxic Brain Injury: Highest GCS obtained is 8. Mostly stays at GCS 5. GCS did reach 7 on 11/27/23 which represents an isolated event. GCS 5 remains the baseline. Patient continues with ventilator support to allow time to determine extent of recovery. Injury is severe persistent vegetative state. Multiple EEGs and continuous EEG show no seizure activity. EEG is consistent with severe encephalopathy. MRI and CT head show no structural injury. Patient did not qualify for hypothermia therapy as she had respiratory arrest leading to cardiac arrest.  There is no data to show recovery benefit in this population. She did receive hypertonic saline to minimize risk of cerebral edema keeping serum sodium 145-150. Patient on Provigil for neuro-stimulation. On 11/28/23, Keppra dose to 250 mg bid. Look to discontinue Keppra if no seizures for 2 weeks after that. EEG completed 11/16/23 shows ongoing cortical dysfunction. Seems to have reached plateau of neurologic recovery. GCS 5 is the baseline. Nurses documenting GCS 7. Nurses documenting GCS 7-9. I have not seen this level. Continue to monitor for neurologic recovery.  reports no further advancement in neurologic status in over 2 weeks.  is discussing parameters of care with family. Plan further discussion on this topic with him in the morning. Acute Respiratory Failure: Intubated with difficulty on 10/16/23 secondary to complete airway collapse with no capacity for air exchange. What air was in the lungs was trapped in the lungs. The amount of laryngeal edema was intense and required a stylette to penetrate the edema and secure the airway.  Patient remains on mechanical ventilator for airway protection. Continue with ventilator support. Family reports patient would not want tracheostomy tube again-ever. No plans for extubation at this time. My discussion with  on 11/17/23 indicates they would consider tracheostomy tube to extend time to recover. Post Cricotracheal resection with laryngoplasty: 10/12/23. Dr. Erika Ann following. Debridement completed 11/16/23. Bilateral DVT: On heparin. Right DVT has resolved on venous doppler completed 11/1/23. Left DVTs persist.  On therapeutic Lovenox. DM II: On Lantus insulin with SSI. Diastolic Heart Failure: Echo 1/2/23 shows EF 60%. On diuretics. HTN: Currently on Bumex. Anemia:Trending H/H. Edema:  Diurese. Nutrition: On TF. GI prophylaxis: Pepcid. Hypotension:  Resolved. .  Tongue biting:  Primarily left sided. Repeat 4 hour EEG on 11/16/23 showed no seizures. Resolved. CC:  Anoxic Brain Injury  HPI: Patient is a 81year old white female nonsmoker. She has a history of CAD with stenting in 2008. She has degenerative arthritis, diabetic neuropathy with bilateral feet numbness at baseline. She has type II DM, previous PE in January 2023, hyperlipidemia, and HTN. Patient has history of COVID-19 with associated respiratory failure requiring intubation and subsequent tracheosotmy tube placement. She developed tracheal stenosis. Repeated laryngoscopy with dilation did not sustain improvement. She was admitted to [facility] on 10/12/23 and underwnet cricotracheal resection with laryngoplasty. She failed post-operative extubation due to airway inflammation. She was maintained on mechanical ventilator until extubation trial 10/16. At extubation, she fully collapsed her airway trapping air in the chest.  She could not exchange respirations meaning she could not move air in or out of the airway.   She developed respiratory arrest and subsequent cardiac arrest.  Ambu respirations could not penetrate the

## 2023-11-30 NOTE — PROGRESS NOTES
Pt was unable to assess but was blessed.     11/30/23 1543   Encounter Summary   Service Provided For: Patient   Referral/Consult From: Rounding   Last Encounter  11/30/23  (NR)   Complexity of Encounter Low   Spiritual/Emotional needs   Type Spiritual Support   Assessment/Intervention/Outcome   Assessment Unable to assess

## 2023-11-30 NOTE — CARE COORDINATION
11/30/23, 12:16 PM EST    DISCHARGE PLANNING EVALUATION      requested to speak with SW about possible options for pt if they decide to place trach. SW called  who was currently at [address] [facility] with his son who is having a hip replacement. SW spoke briefly with  and provided potential options if family decides on trach or do not agree to trach.  states he wants to speak with Dr Ruben Rosa again before he makes a decision.

## 2023-11-30 NOTE — PLAN OF CARE
Problem: Discharge Planning  Goal: Discharge to home or other facility with appropriate resources  Outcome: Not Progressing  Flowsheets (Taken 11/29/2023 1900)  Discharge to home or other facility with appropriate resources:   Arrange for needed discharge resources and transportation as appropriate   Identify barriers to discharge with patient and caregiver   Arrange for interpreters to assist at discharge as needed     Problem: Chronic Conditions and Co-morbidities  Goal: Patient's chronic conditions and co-morbidity symptoms are monitored and maintained or improved  Outcome: Not Progressing  Flowsheets (Taken 11/29/2023 1900)  Care Plan - Patient's Chronic Conditions and Co-Morbidity Symptoms are Monitored and Maintained or Improved:   Collaborate with multidisciplinary team to address chronic and comorbid conditions and prevent exacerbation or deterioration   Monitor and assess patient's chronic conditions and comorbid symptoms for stability, deterioration, or improvement   Update acute care plan with appropriate goals if chronic or comorbid symptoms are exacerbated and prevent overall improvement and discharge     Problem: Safety - Adult  Goal: Free from fall injury  Outcome: Progressing     Problem: Pain  Goal: Verbalizes/displays adequate comfort level or baseline comfort level  Outcome: Progressing  Flowsheets (Taken 11/29/2023 1900)  Verbalizes/displays adequate comfort level or baseline comfort level:   Encourage patient to monitor pain and request assistance   Assess pain using appropriate pain scale     Problem: Respiratory - Adult  Goal: Achieves optimal ventilation and oxygenation  Outcome: Progressing  Flowsheets (Taken 11/29/2023 1900)  Achieves optimal ventilation and oxygenation:   Oxygen supplementation based on oxygen saturation or arterial blood gases   Encourage broncho-pulmonary hygiene including cough, deep breathe, incentive spirometry   Respiratory therapy support as indicated     Problem: and concerns, and demonstrate effective coping:   Provide emotional support, including active listening and acknowledgement of concerns of patient and caregivers   Assist patient/family to identify coping skills, available support systems and cultural and spiritual values   Reduce environmental stimuli, as able     Problem: Confusion  Goal: Confusion, delirium, dementia, or psychosis is improved or at baseline  Description: INTERVENTIONS:  1. Assess for possible contributors to thought disturbance, including medications, impaired vision or hearing, underlying metabolic abnormalities, dehydration, psychiatric diagnoses, and notify attending LIP  2. [place] high risk fall precautions, as indicated  3. Provide frequent short contacts to provide reality reorientation, refocusing and direction  4. Decrease environmental stimuli, including noise as appropriate  5. Monitor and intervene to maintain adequate nutrition, hydration, elimination, sleep and activity  6. If unable to ensure safety without constant attention obtain sitter and review sitter guidelines with assigned personnel  7.  Initiate Psychosocial CNS and Spiritual Care consult, as indicated  Recent Flowsheet Documentation  Taken 11/29/2023 1900 by Isis Heck RN  Effect of thought disturbance (confusion, delirium, dementia, or psychosis) are managed with adequate functional status:   Assess for contributors to thought disturbance, including medications, impaired vision or hearing, underlying metabolic abnormalities, dehydration, psychiatric diagnoses, notify [address] high risk fall precautions, as indicated     Problem: Discharge Planning  Goal: Discharge to home or other facility with appropriate resources  Outcome: Not Progressing  Flowsheets (Taken 11/29/2023 1900)  Discharge to home or other facility with appropriate resources:   Arrange for needed discharge resources and transportation as appropriate   Identify barriers to discharge with

## 2023-12-01 LAB
ANION GAP SERPL CALC-SCNC: 9 MEQ/L (ref 8–16)
BASOPHILS ABSOLUTE: 0 THOU/MM3 (ref 0–0.1)
BASOPHILS NFR BLD AUTO: 0.2 %
BUN SERPL-MCNC: 30 MG/DL (ref 7–22)
CALCIUM SERPL-MCNC: 9.4 MG/DL (ref 8.5–10.5)
CHLORIDE SERPL-SCNC: 100 MEQ/L (ref 98–111)
CO2 SERPL-SCNC: 32 MEQ/L (ref 23–33)
CREAT SERPL-MCNC: 0.5 MG/DL (ref 0.4–1.2)
DEPRECATED RDW RBC AUTO: 48.9 FL (ref 35–45)
EOSINOPHIL NFR BLD AUTO: 1.9 %
EOSINOPHILS ABSOLUTE: 0.2 THOU/MM3 (ref 0–0.4)
ERYTHROCYTE [DISTWIDTH] IN BLOOD BY AUTOMATED COUNT: 14.6 % (ref 11.5–14.5)
GFR SERPL CREATININE-BSD FRML MDRD: > 60 ML/MIN/1.73M2
GLUCOSE BLD STRIP.AUTO-MCNC: 104 MG/DL (ref 70–108)
GLUCOSE BLD STRIP.AUTO-MCNC: 119 MG/DL (ref 70–108)
GLUCOSE BLD STRIP.AUTO-MCNC: 91 MG/DL (ref 70–108)
GLUCOSE SERPL-MCNC: 100 MG/DL (ref 70–108)
HCT VFR BLD AUTO: 29.6 % (ref 37–47)
HGB BLD-MCNC: 9.5 GM/DL (ref 12–16)
IMM GRANULOCYTES # BLD AUTO: 0.06 THOU/MM3 (ref 0–0.07)
IMM GRANULOCYTES NFR BLD AUTO: 0.7 %
LYMPHOCYTES ABSOLUTE: 1.4 THOU/MM3 (ref 1–4.8)
LYMPHOCYTES NFR BLD AUTO: 17.2 %
MCH RBC QN AUTO: 29.1 PG (ref 26–33)
MCHC RBC AUTO-ENTMCNC: 32.1 GM/DL (ref 32.2–35.5)
MCV RBC AUTO: 90.8 FL (ref 81–99)
MONOCYTES ABSOLUTE: 0.6 THOU/MM3 (ref 0.4–1.3)
MONOCYTES NFR BLD AUTO: 7.6 %
NEUTROPHILS NFR BLD AUTO: 72.4 %
NRBC BLD AUTO-RTO: 0 /100 WBC
PLATELET # BLD AUTO: 274 THOU/MM3 (ref 130–400)
PMV BLD AUTO: 10.1 FL (ref 9.4–12.4)
POTASSIUM SERPL-SCNC: 3.7 MEQ/L (ref 3.5–5.2)
RBC # BLD AUTO: 3.26 MILL/MM3 (ref 4.2–5.4)
SEGMENTED NEUTROPHILS ABSOLUTE COUNT: 5.8 THOU/MM3 (ref 1.8–7.7)
SODIUM SERPL-SCNC: 141 MEQ/L (ref 135–145)
WBC # BLD AUTO: 8 THOU/MM3 (ref 4.8–10.8)

## 2023-12-01 PROCEDURE — 94003 VENT MGMT INPAT SUBQ DAY: CPT

## 2023-12-01 PROCEDURE — 36415 COLL VENOUS BLD VENIPUNCTURE: CPT

## 2023-12-01 PROCEDURE — 6370000000 HC RX 637 (ALT 250 FOR IP): Performed by: INTERNAL MEDICINE

## 2023-12-01 PROCEDURE — 82948 REAGENT STRIP/BLOOD GLUCOSE: CPT

## 2023-12-01 PROCEDURE — 99291 CRITICAL CARE FIRST HOUR: CPT | Performed by: INTERNAL MEDICINE

## 2023-12-01 PROCEDURE — 99024 POSTOP FOLLOW-UP VISIT: CPT | Performed by: PHYSICIAN ASSISTANT

## 2023-12-01 PROCEDURE — 6370000000 HC RX 637 (ALT 250 FOR IP): Performed by: NURSE PRACTITIONER

## 2023-12-01 PROCEDURE — 2000000000 HC ICU R&B

## 2023-12-01 PROCEDURE — 80048 BASIC METABOLIC PNL TOTAL CA: CPT

## 2023-12-01 PROCEDURE — 85025 COMPLETE CBC W/AUTO DIFF WBC: CPT

## 2023-12-01 PROCEDURE — 6360000002 HC RX W HCPCS: Performed by: INTERNAL MEDICINE

## 2023-12-01 PROCEDURE — 2580000003 HC RX 258: Performed by: NURSE PRACTITIONER

## 2023-12-01 RX ADMIN — CALCIUM POLYCARBOPHIL 625 MG: 625 TABLET, FILM COATED ORAL at 08:51

## 2023-12-01 RX ADMIN — MODAFINIL 200 MG: 100 TABLET ORAL at 08:51

## 2023-12-01 RX ADMIN — MICONAZOLE NITRATE: 2 POWDER TOPICAL at 20:13

## 2023-12-01 RX ADMIN — SODIUM CHLORIDE, PRESERVATIVE FREE 10 ML: 5 INJECTION INTRAVENOUS at 20:14

## 2023-12-01 RX ADMIN — LEVETIRACETAM 250 MG: 500 TABLET, FILM COATED ORAL at 08:51

## 2023-12-01 RX ADMIN — CHLORHEXIDINE GLUCONATE 0.12% ORAL RINSE 15 ML: 1.2 LIQUID ORAL at 08:52

## 2023-12-01 RX ADMIN — ENOXAPARIN SODIUM 80 MG: 100 INJECTION SUBCUTANEOUS at 08:51

## 2023-12-01 RX ADMIN — FAMOTIDINE 20 MG: 20 TABLET ORAL at 08:51

## 2023-12-01 RX ADMIN — SODIUM CHLORIDE, PRESERVATIVE FREE 10 ML: 5 INJECTION INTRAVENOUS at 08:53

## 2023-12-01 RX ADMIN — ENOXAPARIN SODIUM 80 MG: 100 INJECTION SUBCUTANEOUS at 20:12

## 2023-12-01 RX ADMIN — LEVETIRACETAM 250 MG: 500 TABLET, FILM COATED ORAL at 20:14

## 2023-12-01 RX ADMIN — FAMOTIDINE 20 MG: 20 TABLET ORAL at 20:12

## 2023-12-01 RX ADMIN — MICONAZOLE NITRATE: 2 POWDER TOPICAL at 08:53

## 2023-12-01 RX ADMIN — CHLORHEXIDINE GLUCONATE 0.12% ORAL RINSE 15 ML: 1.2 LIQUID ORAL at 20:12

## 2023-12-01 RX ADMIN — INSULIN GLARGINE 30 UNITS: 100 INJECTION, SOLUTION SUBCUTANEOUS at 20:12

## 2023-12-01 RX ADMIN — BUMETANIDE 0.5 MG: 0.5 TABLET ORAL at 08:51

## 2023-12-01 RX ADMIN — FOLIC ACID 1 MG: 1 TABLET ORAL at 08:52

## 2023-12-01 ASSESSMENT — PAIN SCALES - GENERAL
PAINLEVEL_OUTOF10: 0

## 2023-12-01 ASSESSMENT — PAIN SCALES - WONG BAKER
WONGBAKER_NUMERICALRESPONSE: 0

## 2023-12-01 ASSESSMENT — PULMONARY FUNCTION TESTS
PIF_VALUE: 15
PIF_VALUE: 17

## 2023-12-01 NOTE — PLAN OF CARE
This RN spoke with family at bedside with updates. Family was taken to the family waiting room with Dr Usman Moeller and Dr Renea De La Fuente for a family discussion. When this RN entered the room, permission to sit in with them for the meeting was granted by . Christiano Gutting through the meeting, the  stopped the conversation and asked this RN, \"Excuse me, why are you in here? Who even are you? \". This RN and Dr Usman Moeller re-stated primary RN's role in the patient's care.

## 2023-12-01 NOTE — PLAN OF CARE
Problem: Respiratory - Adult  Goal: Achieves optimal ventilation and oxygenation  Flowsheets (Taken 12/1/2023 0528)  Achieves optimal ventilation and oxygenation:   Assess for changes in respiratory status   Position to facilitate oxygenation and minimize respiratory effort   Assess the need for suctioning and aspirate as needed   Respiratory therapy support as indicated   Oxygen supplementation based on oxygen saturation or arterial blood gases                                                Patient Weaning Progress    Pt remains on Spontaneous mode of mechanical ventilation. Tolerating well at this time. RSBI is 37 at this time and has remained within normal limits throughout the night. RR 15 . Unable to get agreement for goals because no family is present and patient cannot respond.

## 2023-12-01 NOTE — PROGRESS NOTES
Patient:  Curry Belcher    Unit/Bed:4D-08/008-A  MRN: [id number]   PCP: Neftali Hackett MD  Date of Admission: 10/12/2023    Assessment and Plan(All pulmonary edema, renal failure, PE, and respiratory failure diagnoses are acute in nature unless otherwise specified): Anoxic Brain Injury: Highest GCS obtained is 8. Mostly stays at GCS 5. GCS did reach 7 on 11/27/23 which represents an isolated event. GCS 5 remains the baseline. Patient continues with ventilator support to allow time to determine extent of recovery. Injury is severe persistent vegetative state. Multiple EEGs and continuous EEG show no seizure activity. EEG is consistent with severe encephalopathy. MRI and CT head show no structural injury. Patient did not qualify for hypothermia therapy as she had respiratory arrest leading to cardiac arrest.  There is no data to show recovery benefit in this population. She did receive hypertonic saline to minimize risk of cerebral edema keeping serum sodium 145-150. Patient on Provigil for neuro-stimulation. On 11/28/23, Keppra dose to 250 mg bid. Look to discontinue Keppra if no seizures for 2 weeks after that. EEG completed 11/16/23 shows ongoing cortical dysfunction. Seems to have reached plateau of neurologic recovery. GCS 5 is the baseline. Nurses documenting GCS 7-9. I have not seen this level. The best level I can achieve is GCS 6 (1 for no motor response, 1 for non verbal, and 4 for spontaneous eye opening:  Mostly eye opening is to sound or touch)  Continue to monitor for neurologic recovery.  reports no further advancement in neurologic status in over 2 weeks. Acute Respiratory Failure: Intubated with difficulty on 10/16/23 secondary to complete airway collapse with no capacity for air exchange. What air was in the lungs was trapped in the lungs. The amount of laryngeal edema was intense and required a stylette to penetrate the edema and secure the airway.  Patient dorsalis pedis pulses. Skin:  warm and moist.  Psych:  Persistent vegetative state. Lymph:  No supraclavicular adenopathy. Neurologic: No posturing. No seizures. Will open eyes to touch. No focus of pupils and no tracking. No DTR bilaterally. Persistent up going babinski. No clonus. Patient will yawn and cough spontaneously. Oculocephalic reflexes intact. Rooting reflex present. Data: (All radiographs, tracings, PFTs, and imaging are personally viewed and interpreted unless otherwise noted). Venous Doppler report 11/1/23: Left axillary vein acute thrombus. Chronic DVT left subclavian vein. Telemetry shows sinus rhythm. EEG report 11/16/23:  Cortical dysfunction with severe encephalopathy. Sodium 141, potassium 3.7, chloride 100, bicarb 32, BUN 30, creatinine 0.5, glucose 100. White blood cell count 8, hemoglobin 9.5, platelets 708    CC time 35 minutes. Time was discontiguous and does not include procedures. Electronically signed by Angeles Wong M.D.

## 2023-12-01 NOTE — PROGRESS NOTES
Pt is a 72y. o. female, intubated and sedated, in 4D-08. No family were present;  prayed for pt in the doorway to her room.      12/01/23 1633   Encounter Summary   Encounter Overview/Reason  Attempted Encounter   Service Provided For: Patient   Referral/Consult From: [facility]   Support System Spouse   Last Encounter  12/01/23  (NR)   Complexity of Encounter Low   Spiritual/Emotional needs   Type Spiritual Support   Assessment/Intervention/Outcome   Assessment Unable to assess   Intervention Prayer (assurance of)/[place]   Outcome Did not respond

## 2023-12-01 NOTE — PROGRESS NOTES
Comprehensive Nutrition Assessment    Type and Reason for Visit:  Reassess (Tube Feed Monitor)    Nutrition Recommendations/Plan:   Continue Glucerna 1.5 at goal of 40ml/hour  Continue 200ml free H20 every 6h as per Dr. Ruben Rosa 11/20     Malnutrition Assessment:  Malnutrition Status: At risk for malnutrition (Comment) (11/20/23 1589)    Context:  Chronic Illness     Findings of the 6 clinical characteristics of malnutrition:  Energy Intake:  No significant decrease in energy intake (pt. tolerating TF since admit)  Weight Loss:  Greater than 10% over 6 months (31# or 16% in 5 months)     Body Fat Loss:  No significant body fat loss     Muscle Mass Loss:  No significant muscle mass loss Temples (temporalis), Clavicles (pectoralis & deltoids)  Fluid Accumulation:  Mild     Strength:  Not Performed    Nutrition Assessment:     Pt. stable from a nutritional standpoint AEB tolerating TF at goal while NPO as intubated. 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       Nutrition Related Findings:    Pt. Report/Treatments/Miscellaneous: pt. Seen; tolerating TF; spoke with RN; family mtg this pm re: POC;UO 1080ml; persistent vegetative state  GI Status: BM x1 12/1  Pertinent Labs: glucose 91  BUN 30  creatinine 0.5  Na 141  K+ 3.7    Pertinent Meds: bumex, folic acid, lantus, keppra, fibercon    Wound Type: Pressure Injury (coccyx; stage 1 buttocks; skin tear abdomen; 10/12 ENT surgery: Cricotracheal Resection, Laryngoplasty with Flap and Vocal Fold Lateralization Stitch Placement; debridement completed 11/16)       Current Nutrition Intake & Therapies:    Average Meal Intake: NPO  Average Supplements Intake:

## 2023-12-02 LAB
GLUCOSE BLD STRIP.AUTO-MCNC: 112 MG/DL (ref 70–108)
GLUCOSE BLD STRIP.AUTO-MCNC: 117 MG/DL (ref 70–108)
GLUCOSE BLD STRIP.AUTO-MCNC: 121 MG/DL (ref 70–108)
GLUCOSE BLD STRIP.AUTO-MCNC: 127 MG/DL (ref 70–108)
GLUCOSE BLD STRIP.AUTO-MCNC: 135 MG/DL (ref 70–108)
GLUCOSE BLD STRIP.AUTO-MCNC: 156 MG/DL (ref 70–108)

## 2023-12-02 PROCEDURE — 6370000000 HC RX 637 (ALT 250 FOR IP): Performed by: NURSE PRACTITIONER

## 2023-12-02 PROCEDURE — 99024 POSTOP FOLLOW-UP VISIT: CPT | Performed by: REGISTERED NURSE

## 2023-12-02 PROCEDURE — 94761 N-INVAS EAR/PLS OXIMETRY MLT: CPT

## 2023-12-02 PROCEDURE — 82948 REAGENT STRIP/BLOOD GLUCOSE: CPT

## 2023-12-02 PROCEDURE — 99291 CRITICAL CARE FIRST HOUR: CPT | Performed by: SURGERY

## 2023-12-02 PROCEDURE — 6360000002 HC RX W HCPCS: Performed by: INTERNAL MEDICINE

## 2023-12-02 PROCEDURE — 2580000003 HC RX 258: Performed by: NURSE PRACTITIONER

## 2023-12-02 PROCEDURE — 6370000000 HC RX 637 (ALT 250 FOR IP): Performed by: INTERNAL MEDICINE

## 2023-12-02 PROCEDURE — 2000000000 HC ICU R&B

## 2023-12-02 PROCEDURE — 94003 VENT MGMT INPAT SUBQ DAY: CPT

## 2023-12-02 RX ADMIN — LEVETIRACETAM 250 MG: 500 TABLET, FILM COATED ORAL at 20:26

## 2023-12-02 RX ADMIN — ENOXAPARIN SODIUM 80 MG: 100 INJECTION SUBCUTANEOUS at 20:25

## 2023-12-02 RX ADMIN — INSULIN GLARGINE 30 UNITS: 100 INJECTION, SOLUTION SUBCUTANEOUS at 20:25

## 2023-12-02 RX ADMIN — MODAFINIL 200 MG: 100 TABLET ORAL at 07:36

## 2023-12-02 RX ADMIN — CHLORHEXIDINE GLUCONATE 0.12% ORAL RINSE 15 ML: 1.2 LIQUID ORAL at 07:40

## 2023-12-02 RX ADMIN — FOLIC ACID 1 MG: 1 TABLET ORAL at 07:37

## 2023-12-02 RX ADMIN — BUMETANIDE 0.5 MG: 0.5 TABLET ORAL at 07:38

## 2023-12-02 RX ADMIN — FAMOTIDINE 20 MG: 20 TABLET ORAL at 07:36

## 2023-12-02 RX ADMIN — ENOXAPARIN SODIUM 80 MG: 100 INJECTION SUBCUTANEOUS at 07:35

## 2023-12-02 RX ADMIN — CHLORHEXIDINE GLUCONATE 0.12% ORAL RINSE 15 ML: 1.2 LIQUID ORAL at 20:27

## 2023-12-02 RX ADMIN — MICONAZOLE NITRATE: 2 POWDER TOPICAL at 07:40

## 2023-12-02 RX ADMIN — SODIUM CHLORIDE, PRESERVATIVE FREE 10 ML: 5 INJECTION INTRAVENOUS at 07:36

## 2023-12-02 RX ADMIN — CALCIUM POLYCARBOPHIL 625 MG: 625 TABLET, FILM COATED ORAL at 07:36

## 2023-12-02 RX ADMIN — SODIUM CHLORIDE, PRESERVATIVE FREE 10 ML: 5 INJECTION INTRAVENOUS at 20:27

## 2023-12-02 RX ADMIN — FAMOTIDINE 20 MG: 20 TABLET ORAL at 20:25

## 2023-12-02 RX ADMIN — LEVETIRACETAM 250 MG: 500 TABLET, FILM COATED ORAL at 07:37

## 2023-12-02 RX ADMIN — MICONAZOLE NITRATE: 2 POWDER TOPICAL at 20:26

## 2023-12-02 ASSESSMENT — PAIN SCALES - WONG BAKER
WONGBAKER_NUMERICALRESPONSE: 0

## 2023-12-02 ASSESSMENT — PULMONARY FUNCTION TESTS
PIF_VALUE: 17
PIF_VALUE: 15
PIF_VALUE: 17
PIF_VALUE: 17

## 2023-12-02 ASSESSMENT — PAIN SCALES - GENERAL
PAINLEVEL_OUTOF10: 0

## 2023-12-02 NOTE — PLAN OF CARE
Problem: Respiratory - Adult  Goal: Normal spontaneous ventilation  Outcome: Progressing     Problem: Respiratory - Adult  Goal: Achieves optimal ventilation and oxygenation  12/2/2023 0637 by Maximus Polo RCP  Outcome: Progressing                                                Patient Weaning Progress    The patient's vent settings was able to be weaned this shift. Ventilator settings that were weaned              [x] Mode   [] Pressure support weaned   [] Fio2 weaned   [] Peep weaned      Spontaneous weaning trial  was attempted. due to defined parameters for SBT (spontaneous breathing trial) not being met. *Results of SBT documented under SBTOUTCOME note. Reason that defined ventilator parameters for SBT was not met              [] Patient condition requires increased ventilator settings  [] Requires increased sedation   [] Settings not within weaning range   [] SAT not completed   [] Physician orders    The patient was able to tolerate SBT. RSBI  was 45 with EtCO2 of 44 and SpO2 of 92 on 21% FiO2. Spontanteous VT was 328 and RR 15 breaths/min. The patient remains on sbt    Evac tube was  hooked up with continuous low suction(20-30mmHg)      Cuff was not  deflated to determine cuff leak. Unable to get agreement for goals because no family is present and patient cannot respond.

## 2023-12-02 NOTE — PLAN OF CARE
Problem: Respiratory - Adult  Goal: Normal spontaneous ventilation  12/2/2023 0950 by Kimberly Allen RCP  Outcome: Progressing  12/2/2023 0637 by Madeline Gagnon RCP  Outcome: Progressing                                                Patient Weaning Progress    The patient's vent settings was not able to be weaned this shift. Ventilator settings that were weaned              [] Mode   [] Pressure support weaned   [] Fio2 weaned   [] Peep weaned      Spontaneous weaning trial  was attempted. due to defined parameters for SBT (spontaneous breathing trial) not being met. *Results of SBT documented under SBTOUTCOME note. Reason that defined ventilator parameters for SBT was not met              [] Patient condition requires increased ventilator settings  [] Requires increased sedation   [] Settings not within weaning range   [] SAT not completed   [] Physician orders    The patient was able to tolerate SBT. Unable to get agreement for goals because no family is present and patient cannot respond.

## 2023-12-02 NOTE — PLAN OF CARE
Problem: Discharge Planning  Goal: Discharge to home or other facility with appropriate resources  Outcome: Progressing  Flowsheets (Taken 12/1/2023 1930)  Discharge to home or other facility with appropriate resources: Identify barriers to discharge with patient and caregiver     Problem: Chronic Conditions and Co-morbidities  Goal: Patient's chronic conditions and co-morbidity symptoms are monitored and maintained or improved  Outcome: Progressing     Problem: Safety - Adult  Goal: Free from fall injury  Outcome: Progressing  Note: Falling star program remains in place. Call light and personal belongings within reach. Frequent visual monitoring continues. Toileting program inplace. Patient assisted in turning/repositioning at least once every 2 hours, and on a prn basis. Problem: Pain  Goal: Verbalizes/displays adequate comfort level or baseline comfort level  Outcome: Progressing  Note: Continuing to monitor pain and discomfort. Monitoring pain level on scale of 0-10. Non- pharmacological measures encouraged to reduce discomfort/pain. PRN pain meds administeration continues when/if applicable as ordered by physician.         Problem: Respiratory - Adult  Goal: Achieves optimal ventilation and oxygenation  Outcome: Progressing     Problem: Neurosensory - Adult  Goal: Achieves stable or improved neurological status  Outcome: Progressing  Goal: Absence of seizures  Outcome: Progressing     Problem: Skin/Tissue Integrity - Adult  Goal: Incisions, wounds, or drain sites healing without S/S of infection  Outcome: Progressing     Problem: Gastrointestinal - Adult  Goal: Maintains adequate nutritional intake  Outcome: Progressing     Problem: Cardiovascular - Adult  Goal: Maintains optimal cardiac output and hemodynamic stability  Outcome: Progressing     Problem: Musculoskeletal - Adult  Goal: Maintain proper alignment of affected body part  Outcome: Progressing     Problem: Nutrition Deficit:  Goal: Optimize

## 2023-12-03 LAB
GLUCOSE BLD STRIP.AUTO-MCNC: 116 MG/DL (ref 70–108)
GLUCOSE BLD STRIP.AUTO-MCNC: 122 MG/DL (ref 70–108)
GLUCOSE BLD STRIP.AUTO-MCNC: 126 MG/DL (ref 70–108)
GLUCOSE BLD STRIP.AUTO-MCNC: 136 MG/DL (ref 70–108)
GLUCOSE BLD STRIP.AUTO-MCNC: 138 MG/DL (ref 70–108)
GLUCOSE BLD STRIP.AUTO-MCNC: 139 MG/DL (ref 70–108)

## 2023-12-03 PROCEDURE — 99233 SBSQ HOSP IP/OBS HIGH 50: CPT | Performed by: INTERNAL MEDICINE

## 2023-12-03 PROCEDURE — 6370000000 HC RX 637 (ALT 250 FOR IP): Performed by: NURSE PRACTITIONER

## 2023-12-03 PROCEDURE — 6360000002 HC RX W HCPCS: Performed by: INTERNAL MEDICINE

## 2023-12-03 PROCEDURE — 94003 VENT MGMT INPAT SUBQ DAY: CPT

## 2023-12-03 PROCEDURE — 82948 REAGENT STRIP/BLOOD GLUCOSE: CPT

## 2023-12-03 PROCEDURE — 94761 N-INVAS EAR/PLS OXIMETRY MLT: CPT

## 2023-12-03 PROCEDURE — 2580000003 HC RX 258: Performed by: NURSE PRACTITIONER

## 2023-12-03 PROCEDURE — 2000000000 HC ICU R&B

## 2023-12-03 PROCEDURE — 99024 POSTOP FOLLOW-UP VISIT: CPT | Performed by: REGISTERED NURSE

## 2023-12-03 PROCEDURE — 6370000000 HC RX 637 (ALT 250 FOR IP): Performed by: INTERNAL MEDICINE

## 2023-12-03 RX ADMIN — LEVETIRACETAM 250 MG: 500 TABLET, FILM COATED ORAL at 07:40

## 2023-12-03 RX ADMIN — LEVETIRACETAM 250 MG: 500 TABLET, FILM COATED ORAL at 21:48

## 2023-12-03 RX ADMIN — FAMOTIDINE 20 MG: 20 TABLET ORAL at 21:52

## 2023-12-03 RX ADMIN — CHLORHEXIDINE GLUCONATE 0.12% ORAL RINSE 15 ML: 1.2 LIQUID ORAL at 07:41

## 2023-12-03 RX ADMIN — ENOXAPARIN SODIUM 80 MG: 100 INJECTION SUBCUTANEOUS at 21:46

## 2023-12-03 RX ADMIN — CHLORHEXIDINE GLUCONATE 0.12% ORAL RINSE 15 ML: 1.2 LIQUID ORAL at 21:46

## 2023-12-03 RX ADMIN — FOLIC ACID 1 MG: 1 TABLET ORAL at 07:40

## 2023-12-03 RX ADMIN — SODIUM CHLORIDE, PRESERVATIVE FREE 10 ML: 5 INJECTION INTRAVENOUS at 21:46

## 2023-12-03 RX ADMIN — INSULIN GLARGINE 30 UNITS: 100 INJECTION, SOLUTION SUBCUTANEOUS at 21:56

## 2023-12-03 RX ADMIN — BUMETANIDE 0.5 MG: 0.5 TABLET ORAL at 07:40

## 2023-12-03 RX ADMIN — MICONAZOLE NITRATE: 2 POWDER TOPICAL at 07:41

## 2023-12-03 RX ADMIN — FAMOTIDINE 20 MG: 20 TABLET ORAL at 07:40

## 2023-12-03 RX ADMIN — CALCIUM POLYCARBOPHIL 625 MG: 625 TABLET, FILM COATED ORAL at 07:40

## 2023-12-03 RX ADMIN — MODAFINIL 200 MG: 100 TABLET ORAL at 07:40

## 2023-12-03 RX ADMIN — SODIUM CHLORIDE, PRESERVATIVE FREE 10 ML: 5 INJECTION INTRAVENOUS at 07:41

## 2023-12-03 RX ADMIN — ENOXAPARIN SODIUM 80 MG: 100 INJECTION SUBCUTANEOUS at 07:40

## 2023-12-03 RX ADMIN — MICONAZOLE NITRATE: 2 POWDER TOPICAL at 21:46

## 2023-12-03 ASSESSMENT — PAIN SCALES - GENERAL
PAINLEVEL_OUTOF10: 0
PAINLEVEL_OUTOF10: 0

## 2023-12-03 ASSESSMENT — PAIN SCALES - WONG BAKER
WONGBAKER_NUMERICALRESPONSE: 0

## 2023-12-03 ASSESSMENT — PULMONARY FUNCTION TESTS
PIF_VALUE: 15
PIF_VALUE: 16
PIF_VALUE: 17
PIF_VALUE: 15
PIF_VALUE: 15
PIF_VALUE: 17

## 2023-12-03 NOTE — PROGRESS NOTES
Patient:  Evelyn Henderson    Unit/Bed:4D-08/008-A  MRN: [id number]   PCP: Bertha Pickering MD  Date of Admission: 10/12/2023    Assessment and Plan(All pulmonary edema, renal failure, PE, and respiratory failure diagnoses are acute in nature unless otherwise specified): Anoxic Brain Injury: GCS 6T today on my exam. Per chart review, highest GCS obtained is 8. Mostly stays at GCS 5. GCS did reach 7 on 11/27/23 which represents an isolated event. GCS 5 remains the baseline. Patient continues with ventilator support to allow time to determine extent of recovery. Injury is severe persistent vegetative state. Multiple EEGs and continuous EEG show no seizure activity. EEG is consistent with severe encephalopathy. MRI and CT head show no structural injury. Patient did not qualify for hypothermia therapy as she had respiratory arrest leading to cardiac arrest.  There is no data to show recovery benefit in this population. She did receive hypertonic saline to minimize risk of cerebral edema keeping serum sodium 145-150. Patient on Provigil for neuro-stimulation. On 11/28/23, Keppra dose to 250 mg bid. Look to discontinue Keppra if no seizures for 2 weeks after that. EEG completed 11/16/23 shows ongoing cortical dysfunction. Seems to have reached plateau of neurologic recovery. GCS 5 is the baseline. Nurses documenting GCS 7-9. I have not seen this level. The best level I can achieve is GCS 6 (1 for no motor response, 1 for non verbal, and 4 for spontaneous eye opening:  Mostly eye opening is to sound or touch)  Continue to monitor for neurologic recovery.  reports no further advancement in neurologic status in over 2 weeks. Acute Respiratory Failure: Patient is on minimal vent settings. Mental status precludes SBT. Intubated with difficulty on 10/16/23 secondary to complete airway collapse with no capacity for air exchange. What air was in the lungs was trapped in the lungs.   The

## 2023-12-03 NOTE — PLAN OF CARE
Problem: Respiratory - Adult  Goal: Achieves optimal ventilation and oxygenation  Note: Vent settings optimized to achieve target tidal volume, respiratory rate and ideal oxygen saturations. Will continue to wean as patient tolerates. SBT will be performed when appropriate. Patient Weaning Progress    The patient's vent settings was able to be weaned this shift. Ventilator settings that were weaned              [x] Mode   [] Pressure support weaned   [] Fio2 weaned   [] Peep weaned      Spontaneous weaning trial  was attempted. The patient was able to tolerate SBT. Evac tube was  hooked up with continuous low suction(20-30mmHg)       Unable to get agreement for goals because no family is present and patient cannot respond.

## 2023-12-03 NOTE — PLAN OF CARE
Problem: Discharge Planning  Goal: Discharge to home or other facility with appropriate resources  Outcome: Progressing     Problem: Chronic Conditions and Co-morbidities  Goal: Patient's chronic conditions and co-morbidity symptoms are monitored and maintained or improved  Outcome: Progressing     Problem: Safety - Adult  Goal: Free from fall injury  Outcome: Progressing  Note: Falling star program remains in place. Call light and personal belongings within reach. Frequent visual monitoring continues. Toileting program inplace. Patient assisted in turning/repositioning at least once every 2 hours, and on a prn basis. Problem: Pain  Goal: Verbalizes/displays adequate comfort level or baseline comfort level  Outcome: Progressing  Note: Continuing to monitor pain and discomfort. Monitoring pain level on scale of 0-10. Non- pharmacological measures encouraged to reduce discomfort/pain. PRN pain meds administeration continues when/if applicable as ordered by physician. Problem: Respiratory - Adult  Goal: Achieves optimal ventilation and oxygenation  Outcome: Progressing     Problem: Neurosensory - Adult  Goal: Achieves stable or improved neurological status  Outcome: Progressing  Goal: Absence of seizures  Outcome: Progressing     Problem: Skin/Tissue Integrity - Adult  Goal: Incisions, wounds, or drain sites healing without S/S of infection  Outcome: Progressing  Note: Monitoring patient skin integrity for skin breakdown, turning and repositioning q2h per protocol.      Problem: Gastrointestinal - Adult  Goal: Maintains adequate nutritional intake  Outcome: Progressing     Problem: Cardiovascular - Adult  Goal: Maintains optimal cardiac output and hemodynamic stability  Outcome: Progressing     Problem: Musculoskeletal - Adult  Goal: Maintain proper alignment of affected body part  Outcome: Progressing     Problem: Nutrition Deficit:  Goal: Optimize nutritional status  Outcome: Progressing     Problem: Skin/Tissue Integrity  Goal: Absence of new skin breakdown  Description: 1. Monitor for areas of redness and/or skin breakdown  2. Assess vascular access sites hourly  3. Every 4-6 hours minimum:  Change oxygen saturation probe site  4. Every 4-6 hours:  If on nasal continuous positive airway pressure, respiratory therapy assess nares and determine need for appliance change or resting period. Outcome: Progressing  Note: Monitoring patient skin integrity for skin breakdown, turning and repositioning q2h per protocol. Problem: Coping  Goal: Pt/Family able to verbalize concerns and demonstrate effective coping strategies  Description: INTERVENTIONS:  1. Assist patient/family to identify coping skills, available support systems and cultural and spiritual values  2. Provide emotional support, including active listening and acknowledgement of concerns of patient and caregivers  3. Reduce environmental stimuli, as able  4. Instruct patient/family in relaxation techniques, as appropriate  5.  Assess for spiritual pain/suffering and initiate Spiritual Care, Psychosocial Clinical Specialist consults as needed  Outcome: Progressing     Problem: ABCDS Injury Assessment  Goal: Absence of physical injury  Outcome: Progressing

## 2023-12-04 ENCOUNTER — TELEPHONE (OUTPATIENT)
Dept: ENT CLINIC | Age: 73
End: 2023-12-04

## 2023-12-04 LAB
ANION GAP SERPL CALC-SCNC: 10 MEQ/L (ref 8–16)
BASOPHILS ABSOLUTE: 0 THOU/MM3 (ref 0–0.1)
BASOPHILS NFR BLD AUTO: 0.3 %
BUN SERPL-MCNC: 29 MG/DL (ref 7–22)
CALCIUM SERPL-MCNC: 9.1 MG/DL (ref 8.5–10.5)
CHLORIDE SERPL-SCNC: 98 MEQ/L (ref 98–111)
CO2 SERPL-SCNC: 31 MEQ/L (ref 23–33)
CREAT SERPL-MCNC: 0.6 MG/DL (ref 0.4–1.2)
DEPRECATED RDW RBC AUTO: 49.4 FL (ref 35–45)
EOSINOPHIL NFR BLD AUTO: 1.7 %
EOSINOPHILS ABSOLUTE: 0.1 THOU/MM3 (ref 0–0.4)
ERYTHROCYTE [DISTWIDTH] IN BLOOD BY AUTOMATED COUNT: 14.7 % (ref 11.5–14.5)
GFR SERPL CREATININE-BSD FRML MDRD: > 60 ML/MIN/1.73M2
GLUCOSE BLD STRIP.AUTO-MCNC: 119 MG/DL (ref 70–108)
GLUCOSE BLD STRIP.AUTO-MCNC: 131 MG/DL (ref 70–108)
GLUCOSE BLD STRIP.AUTO-MCNC: 133 MG/DL (ref 70–108)
GLUCOSE BLD STRIP.AUTO-MCNC: 89 MG/DL (ref 70–108)
GLUCOSE BLD STRIP.AUTO-MCNC: 95 MG/DL (ref 70–108)
GLUCOSE SERPL-MCNC: 102 MG/DL (ref 70–108)
HCT VFR BLD AUTO: 27.4 % (ref 37–47)
HGB BLD-MCNC: 8.7 GM/DL (ref 12–16)
IMM GRANULOCYTES # BLD AUTO: 0.14 THOU/MM3 (ref 0–0.07)
IMM GRANULOCYTES NFR BLD AUTO: 2 %
LYMPHOCYTES ABSOLUTE: 1.4 THOU/MM3 (ref 1–4.8)
LYMPHOCYTES NFR BLD AUTO: 19.8 %
MCH RBC QN AUTO: 29.8 PG (ref 26–33)
MCHC RBC AUTO-ENTMCNC: 31.8 GM/DL (ref 32.2–35.5)
MCV RBC AUTO: 93.8 FL (ref 81–99)
MONOCYTES ABSOLUTE: 0.6 THOU/MM3 (ref 0.4–1.3)
MONOCYTES NFR BLD AUTO: 8.2 %
NEUTROPHILS NFR BLD AUTO: 68 %
NRBC BLD AUTO-RTO: 0 /100 WBC
PLATELET # BLD AUTO: 256 THOU/MM3 (ref 130–400)
PMV BLD AUTO: 10 FL (ref 9.4–12.4)
POTASSIUM SERPL-SCNC: 3.7 MEQ/L (ref 3.5–5.2)
RBC # BLD AUTO: 2.92 MILL/MM3 (ref 4.2–5.4)
SEGMENTED NEUTROPHILS ABSOLUTE COUNT: 4.8 THOU/MM3 (ref 1.8–7.7)
SODIUM SERPL-SCNC: 139 MEQ/L (ref 135–145)
WBC # BLD AUTO: 7 THOU/MM3 (ref 4.8–10.8)

## 2023-12-04 PROCEDURE — 94003 VENT MGMT INPAT SUBQ DAY: CPT

## 2023-12-04 PROCEDURE — 6360000002 HC RX W HCPCS: Performed by: INTERNAL MEDICINE

## 2023-12-04 PROCEDURE — 80048 BASIC METABOLIC PNL TOTAL CA: CPT

## 2023-12-04 PROCEDURE — 2580000003 HC RX 258: Performed by: NURSE PRACTITIONER

## 2023-12-04 PROCEDURE — 2700000000 HC OXYGEN THERAPY PER DAY

## 2023-12-04 PROCEDURE — 99024 POSTOP FOLLOW-UP VISIT: CPT | Performed by: REGISTERED NURSE

## 2023-12-04 PROCEDURE — 99291 CRITICAL CARE FIRST HOUR: CPT | Performed by: INTERNAL MEDICINE

## 2023-12-04 PROCEDURE — 6370000000 HC RX 637 (ALT 250 FOR IP): Performed by: NURSE PRACTITIONER

## 2023-12-04 PROCEDURE — 82948 REAGENT STRIP/BLOOD GLUCOSE: CPT

## 2023-12-04 PROCEDURE — 36415 COLL VENOUS BLD VENIPUNCTURE: CPT

## 2023-12-04 PROCEDURE — 85025 COMPLETE CBC W/AUTO DIFF WBC: CPT

## 2023-12-04 PROCEDURE — 2000000000 HC ICU R&B

## 2023-12-04 PROCEDURE — 6370000000 HC RX 637 (ALT 250 FOR IP): Performed by: INTERNAL MEDICINE

## 2023-12-04 PROCEDURE — 94761 N-INVAS EAR/PLS OXIMETRY MLT: CPT

## 2023-12-04 RX ADMIN — INSULIN GLARGINE 30 UNITS: 100 INJECTION, SOLUTION SUBCUTANEOUS at 21:39

## 2023-12-04 RX ADMIN — SODIUM CHLORIDE, PRESERVATIVE FREE 10 ML: 5 INJECTION INTRAVENOUS at 21:26

## 2023-12-04 RX ADMIN — ENOXAPARIN SODIUM 80 MG: 100 INJECTION SUBCUTANEOUS at 08:36

## 2023-12-04 RX ADMIN — MICONAZOLE NITRATE: 2 POWDER TOPICAL at 21:26

## 2023-12-04 RX ADMIN — SODIUM CHLORIDE, PRESERVATIVE FREE 10 ML: 5 INJECTION INTRAVENOUS at 08:36

## 2023-12-04 RX ADMIN — MICONAZOLE NITRATE: 2 POWDER TOPICAL at 08:36

## 2023-12-04 RX ADMIN — CHLORHEXIDINE GLUCONATE 0.12% ORAL RINSE 15 ML: 1.2 LIQUID ORAL at 21:26

## 2023-12-04 RX ADMIN — MODAFINIL 200 MG: 100 TABLET ORAL at 08:36

## 2023-12-04 RX ADMIN — FOLIC ACID 1 MG: 1 TABLET ORAL at 08:36

## 2023-12-04 RX ADMIN — LEVETIRACETAM 250 MG: 500 TABLET, FILM COATED ORAL at 21:26

## 2023-12-04 RX ADMIN — ENOXAPARIN SODIUM 80 MG: 100 INJECTION SUBCUTANEOUS at 21:37

## 2023-12-04 RX ADMIN — FAMOTIDINE 20 MG: 20 TABLET ORAL at 21:37

## 2023-12-04 RX ADMIN — FAMOTIDINE 20 MG: 20 TABLET ORAL at 08:36

## 2023-12-04 RX ADMIN — CHLORHEXIDINE GLUCONATE 0.12% ORAL RINSE 15 ML: 1.2 LIQUID ORAL at 08:36

## 2023-12-04 RX ADMIN — BUMETANIDE 0.5 MG: 0.5 TABLET ORAL at 08:36

## 2023-12-04 RX ADMIN — LEVETIRACETAM 250 MG: 500 TABLET, FILM COATED ORAL at 08:36

## 2023-12-04 ASSESSMENT — PAIN SCALES - WONG BAKER
WONGBAKER_NUMERICALRESPONSE: 0

## 2023-12-04 ASSESSMENT — PAIN SCALES - GENERAL
PAINLEVEL_OUTOF10: 0
PAINLEVEL_OUTOF10: 0

## 2023-12-04 ASSESSMENT — PULMONARY FUNCTION TESTS
PIF_VALUE: 15
PIF_VALUE: 16
PIF_VALUE: 15
PIF_VALUE: 16
PIF_VALUE: 15
PIF_VALUE: 16

## 2023-12-04 NOTE — TELEPHONE ENCOUNTER
Called back and talked to  Renee Chiang and all questions answered. Dr. Ivette Casarez notes that each day the ventilator remains in place is risk for further airway damage.

## 2023-12-04 NOTE — TELEPHONE ENCOUNTER
Patient's  called and wondered how much longer Celestino Vivar can be on the ventilator without it causing more damage. Please advise. Printed for Dr. Tj Kent to review.

## 2023-12-04 NOTE — PROGRESS NOTES
This RN asked by Dr. Emelina Pierce to reach out to CT in order to obtain a CD that contained all CTs & MRIs of patient during this admission. CD obtained and given to patient's , Adolph Arguelles, at this time.

## 2023-12-04 NOTE — PROGRESS NOTES
Patient:  Zak Hoganhal    Unit/Bed:4D-08/008-A  MRN: [id number]   PCP: Zhen Kiran MD  Date of Admission: 10/12/2023    Assessment and Plan(All pulmonary edema, renal failure, PE, and respiratory failure diagnoses are acute in nature unless otherwise specified): Anoxic Brain Injury: Highest GCS obtained is 8. Mostly stays at GCS 5. GCS did reach 7 on 11/27/23 which represents an isolated event. GCS 5 remains the baseline. Patient continues with ventilator support to allow time to determine extent of recovery. Injury is severe persistent vegetative state. Multiple EEGs and continuous EEG show no seizure activity. EEG is consistent with severe encephalopathy. MRI and CT head show no structural injury. Patient did not qualify for hypothermia therapy as she had respiratory arrest leading to cardiac arrest.  There is no data to show recovery benefit in this population. She did receive hypertonic saline to minimize risk of cerebral edema keeping serum sodium 145-150. Patient on Provigil for neuro-stimulation. On 11/28/23, Keppra dose to 250 mg bid. Look to discontinue Keppra if no seizures by 12/12/23. EEG completed 11/16/23 shows ongoing cortical dysfunction. Seems to have reached plateau of neurologic recovery. GCS 5 is the baseline. Nurses documenting GCS 7-9. I have not seen this level. The best level I can achieve is GCS 6 (1 for no motor response, 1 for non verbal, and 4 for spontaneous eye opening:  Mostly eye opening is to sound or touch)  Continue to monitor for neurologic recovery.  reports no further advancement in neurologic status in over 2 weeks. Acute Respiratory Failure: Intubated with difficulty on 10/16/23 secondary to complete airway collapse with no capacity for air exchange. What air was in the lungs was trapped in the lungs. The amount of laryngeal edema was intense and required a stylette to penetrate the edema and secure the airway.  Patient remains on

## 2023-12-04 NOTE — PLAN OF CARE
Problem: Respiratory - Adult  Goal: Normal spontaneous ventilation  Outcome: Progressing                                                Patient Weaning Progress    The patient's vent settings was not able to be weaned this shift. Ventilator settings that were weaned              [] Mode   [] Pressure support weaned   [] Fio2 weaned   [] Peep weaned      Spontaneous weaning trial  was attempted. due to defined parameters for SBT (spontaneous breathing trial) not being met. *Results of SBT documented under SBTOUTCOME note. Reason that defined ventilator parameters for SBT was not met              [] Patient condition requires increased ventilator settings  [] Requires increased sedation   [] Settings not within weaning range   [] SAT not completed   [] Physician orders    The patient was able to tolerate SBT. RSBI  was 46 with EtCO2 of 39 and SpO2 of 99 on 25% FiO2. Spontanteous VT was 389 and RR 22 breaths/min. Evac tube was  hooked up with continuous low suction(20-30mmHg)      Cuff was not  deflated to determine cuff leak. Unable to get agreement for goals because no family is present and patient cannot respond.

## 2023-12-04 NOTE — CARE COORDINATION
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 There is also nonocclusive thrombus within the left posterior tibial vein  10/16 Extubated to bipap  10/16 Code Blue  10/16 Reintubated  10/16 Bronch: thick bloody secretions in RLL removed  10/16 CXR: Mild stable cardiomegaly with pulmonary vascular congestion suspicious for congestive heart failure; Prominent pulmonary interstitium suspicious for pulmonary edema  10/17 5 hr EEG: No seizures noted; diffuse background attenuation and suppression without evidence of reactivity; severe encephalopathy  10/17 CT Head: Stable CT appearance the brain. No acute findings  10/17 MRI Brain: No acute findings  88/22 PICC left basilic  22/45 CT Head: No acute findings  10/18 OR: ET Exchange bronhoscopy and lavage  10/19 4hr 40 min EEG: This EEG was abnormal. The background abnormalities indicated a moderate to severe encephalopathy, nonspecific to etiology. The generalized periodic discharges (GPDs) indicated underlying cortical irritability/increased risk of seizures, and can be seen in various encephalopathies.   No seizures were recorded  10/19 MRI Brain: No evidence of an acute infarct. No evidence of anoxia; Stable mild severity chronic small vessel ischemic changes; Global volume loss  10/23 4-day EEG: Moderate to severe encephalopathy, improved as recording progressed. Frequent and at times near continuous runs of GPDs with often typical and at times atypical triphasic morphology, activated with stimulation at the beginning of recording, consistent with SIRPIDs   10/23 BUE Venous Dopplers: There is noncompressibility and absent flow, likely secondary to acute thrombus, in the left axillary, brachial, basilic and cephalic veins. There is partial flow and compressibility in the left subclavian vein. There is noncompressibility and absent flow in the right cephalic and antecubital veins. Remaining vein segments demonstrate normal compressibility and flow  11/2 BUE Venous dopplers: There is acute appearing DVT in the left axillary vein; Chronic appearing DVT is present in the distal left subclavian vein; Superficial thrombophlebitis involving the left basilic vein; Overall this examination has improved since the prior exam dated 10/23/202  11/16 EEG 40 min: abnormal due to disorganized and slow background. Occasional generalized periodic discharges with triphasic morphology. At times there were increased frequency of these discharges with activation consistent with SIRPIDs. No electrographic seizures on this recording  11/16 Suspension Laryngoscopy and Bronchoscopy, with Debridement and steroid injection  11/16 4.5 hr EEG: abnormal.  Diffuse low amplitude polymorphic delta slowing and occasional triphasic waves suggested severe encephalopathy. No abnormal epileptiform activity was seen           Barriers to Discharge: Intensivist, ENT following. POD 53. Remains intubated and unresponsive. Pupils reactive, doesn't withdraw purposefully to stimuli. Remains off sedation.  Per documentation, family discussing goals of care and have not made

## 2023-12-05 LAB
GLUCOSE BLD STRIP.AUTO-MCNC: 100 MG/DL (ref 70–108)
GLUCOSE BLD STRIP.AUTO-MCNC: 113 MG/DL (ref 70–108)
GLUCOSE BLD STRIP.AUTO-MCNC: 120 MG/DL (ref 70–108)
GLUCOSE BLD STRIP.AUTO-MCNC: 121 MG/DL (ref 70–108)
GLUCOSE BLD STRIP.AUTO-MCNC: 123 MG/DL (ref 70–108)

## 2023-12-05 PROCEDURE — 2000000000 HC ICU R&B

## 2023-12-05 PROCEDURE — 6360000002 HC RX W HCPCS: Performed by: INTERNAL MEDICINE

## 2023-12-05 PROCEDURE — 6370000000 HC RX 637 (ALT 250 FOR IP): Performed by: NURSE PRACTITIONER

## 2023-12-05 PROCEDURE — 2580000003 HC RX 258: Performed by: NURSE PRACTITIONER

## 2023-12-05 PROCEDURE — 94003 VENT MGMT INPAT SUBQ DAY: CPT

## 2023-12-05 PROCEDURE — 6370000000 HC RX 637 (ALT 250 FOR IP): Performed by: INTERNAL MEDICINE

## 2023-12-05 PROCEDURE — 94761 N-INVAS EAR/PLS OXIMETRY MLT: CPT

## 2023-12-05 PROCEDURE — 89220 SPUTUM SPECIMEN COLLECTION: CPT

## 2023-12-05 PROCEDURE — 82948 REAGENT STRIP/BLOOD GLUCOSE: CPT

## 2023-12-05 PROCEDURE — 2700000000 HC OXYGEN THERAPY PER DAY

## 2023-12-05 PROCEDURE — 99291 CRITICAL CARE FIRST HOUR: CPT | Performed by: INTERNAL MEDICINE

## 2023-12-05 RX ADMIN — MICONAZOLE NITRATE: 2 POWDER TOPICAL at 08:16

## 2023-12-05 RX ADMIN — LEVETIRACETAM 250 MG: 500 TABLET, FILM COATED ORAL at 08:16

## 2023-12-05 RX ADMIN — SODIUM CHLORIDE, PRESERVATIVE FREE 10 ML: 5 INJECTION INTRAVENOUS at 08:13

## 2023-12-05 RX ADMIN — FAMOTIDINE 20 MG: 20 TABLET ORAL at 20:24

## 2023-12-05 RX ADMIN — MICONAZOLE NITRATE: 2 POWDER TOPICAL at 20:25

## 2023-12-05 RX ADMIN — SODIUM CHLORIDE, PRESERVATIVE FREE 10 ML: 5 INJECTION INTRAVENOUS at 20:24

## 2023-12-05 RX ADMIN — FAMOTIDINE 20 MG: 20 TABLET ORAL at 08:19

## 2023-12-05 RX ADMIN — LEVETIRACETAM 250 MG: 500 TABLET, FILM COATED ORAL at 20:24

## 2023-12-05 RX ADMIN — INSULIN GLARGINE 30 UNITS: 100 INJECTION, SOLUTION SUBCUTANEOUS at 20:25

## 2023-12-05 RX ADMIN — BUMETANIDE 0.5 MG: 0.5 TABLET ORAL at 08:15

## 2023-12-05 RX ADMIN — CHLORHEXIDINE GLUCONATE 0.12% ORAL RINSE 15 ML: 1.2 LIQUID ORAL at 08:14

## 2023-12-05 RX ADMIN — CHLORHEXIDINE GLUCONATE 0.12% ORAL RINSE 15 ML: 1.2 LIQUID ORAL at 20:24

## 2023-12-05 RX ADMIN — MODAFINIL 200 MG: 100 TABLET ORAL at 08:20

## 2023-12-05 RX ADMIN — FOLIC ACID 1 MG: 1 TABLET ORAL at 08:15

## 2023-12-05 RX ADMIN — ENOXAPARIN SODIUM 80 MG: 100 INJECTION SUBCUTANEOUS at 20:24

## 2023-12-05 RX ADMIN — ENOXAPARIN SODIUM 80 MG: 100 INJECTION SUBCUTANEOUS at 08:14

## 2023-12-05 ASSESSMENT — PAIN SCALES - GENERAL
PAINLEVEL_OUTOF10: 0

## 2023-12-05 ASSESSMENT — PULMONARY FUNCTION TESTS
PIF_VALUE: 17
PIF_VALUE: 15
PIF_VALUE: 15
PIF_VALUE: 19
PIF_VALUE: 15
PIF_VALUE: 15

## 2023-12-05 NOTE — PROGRESS NOTES
Pt was unresponsive but was blessed.     12/05/23 1643   Encounter Summary   Service Provided For: Patient   Referral/Consult From: [facility]   Last Encounter  12/05/23   Complexity of Encounter Low   Spiritual/Emotional needs   Type Spiritual Support   Assessment/Intervention/Outcome   Assessment Unable to assess

## 2023-12-05 NOTE — PLAN OF CARE
Problem: Respiratory - Adult  Goal: Achieves optimal ventilation and oxygenation  Outcome: Not Progressing  Note: Intubated/Vented.  Wean as tolerated

## 2023-12-05 NOTE — PROGRESS NOTES
Comprehensive Nutrition Assessment    Type and Reason for Visit:  Reassess (Tube Feed Monitor)    Nutrition Recommendations/Plan:   Continue Glucerna 1.5 at goal of 40ml/hour  Continue 200ml free H20 every 6h as per Dr. Deacon Arauz 11/20     Malnutrition Assessment:  Malnutrition Status: At risk for malnutrition (Comment) (11/20/23 6019)    Context:  Chronic Illness     Findings of the 6 clinical characteristics of malnutrition:  Energy Intake:  No significant decrease in energy intake (pt. tolerating TF since admit)  Weight Loss:  Greater than 10% over 6 months (31# or 16% in 5 months)     Body Fat Loss:  No significant body fat loss     Muscle Mass Loss:  No significant muscle mass loss Temples (temporalis), Clavicles (pectoralis & deltoids)  Fluid Accumulation:  Mild     Strength:  Not Performed    Nutrition Assessment:      Pt. stable from a nutritional standpoint AEB tolerating TF at goal while NPO as intubated. 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        Nutrition Related Findings:    Pt. Report/Treatments/Miscellaneous: pt. Seen; tolerating TF; spoke with RN; persistent vegetative state; UO 1000ml; family mtgs ongoing  GI Status: BM x1 12/4  Pertinent Labs: glucose 113  BUN 29  creatinine 0.6  Na 139  K+ 3.7  Pertinent Meds: bumex, folic acid, lantus, keppra    Wound Type: Pressure Injury, Stage II (coccyx; stage 1 buttocks;  skin tear abdomen; 10/12 ENT surgery: Cricotracheal Resection, Laryngoplasty with Flap and Vocal Fold Lateralization Stitch Placement; debridement completed 11/16)       Current Nutrition Intake & Therapies:    Average Meal Intake: NPO  Average Supplements Intake: Interdisciplinary Rounds       Goals:  Previous Goal Met: Progressing toward Goal(s)  Goals: Tolerate nutrition support at goal rate, by next RD assessment       Nutrition Monitoring and Evaluation:      Food/Nutrient Intake Outcomes: Enteral Nutrition Intake/Tolerance  Physical Signs/Symptoms Outcomes: Biochemical Data, Chewing or Swallowing, GI Status, Fluid Status or Edema, Hemodynamic Status, Nutrition Focused Physical Findings, Skin, Weight    Discharge Planning:     Too soon to determine     Heriberto Trotter RD, LD  Contact: [phone]

## 2023-12-05 NOTE — CARE COORDINATION
12/5/23, 2:48 PM EST    DISCHARGE PLANNING EVALUATION     SW met with  today in ICU to offer support and answer any questions pertaining to discharge planning.  states he and his 3 children had a 2 hr conversation with 2 physicians a few days ago and now it is up to family to make a decision based on the info provided.  states he had no additional questions for SW and feel like we had covered all possible options.

## 2023-12-05 NOTE — PROGRESS NOTES
Patient Weaning Progress    The patient's vent settings was able to be weaned this shift. Ventilator settings that were weaned              [x] Mode   [] Pressure support weaned   [] Fio2 weaned   [] Peep weaned      Spontaneous weaning trial  was attempted. The patient was able to tolerate SBT. RSBI  was 45 with EtCO2 of 44 and SpO2 of 96 on 35% FiO2. Spontanteous VT was 398 and RR 18 breaths/min. Cuff was not  deflated to determine cuff leak. Unable to get agreement for goals because no family is present and patient cannot respond.

## 2023-12-06 LAB
ANION GAP SERPL CALC-SCNC: 8 MEQ/L (ref 8–16)
BASOPHILS ABSOLUTE: 0 THOU/MM3 (ref 0–0.1)
BASOPHILS NFR BLD AUTO: 0.5 %
BUN SERPL-MCNC: 29 MG/DL (ref 7–22)
CALCIUM SERPL-MCNC: 8.7 MG/DL (ref 8.5–10.5)
CHLORIDE SERPL-SCNC: 101 MEQ/L (ref 98–111)
CO2 SERPL-SCNC: 31 MEQ/L (ref 23–33)
CREAT SERPL-MCNC: 0.5 MG/DL (ref 0.4–1.2)
DEPRECATED RDW RBC AUTO: 50.6 FL (ref 35–45)
EOSINOPHIL NFR BLD AUTO: 2.1 %
EOSINOPHILS ABSOLUTE: 0.2 THOU/MM3 (ref 0–0.4)
ERYTHROCYTE [DISTWIDTH] IN BLOOD BY AUTOMATED COUNT: 14.8 % (ref 11.5–14.5)
GFR SERPL CREATININE-BSD FRML MDRD: > 60 ML/MIN/1.73M2
GLUCOSE BLD STRIP.AUTO-MCNC: 117 MG/DL (ref 70–108)
GLUCOSE BLD STRIP.AUTO-MCNC: 118 MG/DL (ref 70–108)
GLUCOSE BLD STRIP.AUTO-MCNC: 141 MG/DL (ref 70–108)
GLUCOSE BLD STRIP.AUTO-MCNC: 146 MG/DL (ref 70–108)
GLUCOSE SERPL-MCNC: 115 MG/DL (ref 70–108)
HCT VFR BLD AUTO: 29.3 % (ref 37–47)
HGB BLD-MCNC: 9.1 GM/DL (ref 12–16)
IMM GRANULOCYTES # BLD AUTO: 0.13 THOU/MM3 (ref 0–0.07)
IMM GRANULOCYTES NFR BLD AUTO: 1.7 %
LYMPHOCYTES ABSOLUTE: 1.6 THOU/MM3 (ref 1–4.8)
LYMPHOCYTES NFR BLD AUTO: 21 %
MCH RBC QN AUTO: 29.4 PG (ref 26–33)
MCHC RBC AUTO-ENTMCNC: 31.1 GM/DL (ref 32.2–35.5)
MCV RBC AUTO: 94.5 FL (ref 81–99)
MONOCYTES ABSOLUTE: 0.8 THOU/MM3 (ref 0.4–1.3)
MONOCYTES NFR BLD AUTO: 10.3 %
NEUTROPHILS NFR BLD AUTO: 64.4 %
NRBC BLD AUTO-RTO: 0 /100 WBC
PLATELET # BLD AUTO: 255 THOU/MM3 (ref 130–400)
PMV BLD AUTO: 10.2 FL (ref 9.4–12.4)
POTASSIUM SERPL-SCNC: 4.3 MEQ/L (ref 3.5–5.2)
RBC # BLD AUTO: 3.1 MILL/MM3 (ref 4.2–5.4)
REASON FOR REJECTION: NORMAL
REJECTED TEST: NORMAL
SEGMENTED NEUTROPHILS ABSOLUTE COUNT: 4.9 THOU/MM3 (ref 1.8–7.7)
SODIUM SERPL-SCNC: 140 MEQ/L (ref 135–145)
WBC # BLD AUTO: 7.6 THOU/MM3 (ref 4.8–10.8)

## 2023-12-06 PROCEDURE — 89220 SPUTUM SPECIMEN COLLECTION: CPT

## 2023-12-06 PROCEDURE — 6370000000 HC RX 637 (ALT 250 FOR IP): Performed by: NURSE PRACTITIONER

## 2023-12-06 PROCEDURE — 2000000000 HC ICU R&B

## 2023-12-06 PROCEDURE — 85025 COMPLETE CBC W/AUTO DIFF WBC: CPT

## 2023-12-06 PROCEDURE — 80048 BASIC METABOLIC PNL TOTAL CA: CPT

## 2023-12-06 PROCEDURE — 99291 CRITICAL CARE FIRST HOUR: CPT | Performed by: INTERNAL MEDICINE

## 2023-12-06 PROCEDURE — 94003 VENT MGMT INPAT SUBQ DAY: CPT

## 2023-12-06 PROCEDURE — 82948 REAGENT STRIP/BLOOD GLUCOSE: CPT

## 2023-12-06 PROCEDURE — 36415 COLL VENOUS BLD VENIPUNCTURE: CPT

## 2023-12-06 PROCEDURE — 2580000003 HC RX 258: Performed by: NURSE PRACTITIONER

## 2023-12-06 PROCEDURE — 6360000002 HC RX W HCPCS: Performed by: INTERNAL MEDICINE

## 2023-12-06 PROCEDURE — 6370000000 HC RX 637 (ALT 250 FOR IP): Performed by: INTERNAL MEDICINE

## 2023-12-06 RX ADMIN — MODAFINIL 200 MG: 100 TABLET ORAL at 09:01

## 2023-12-06 RX ADMIN — ENOXAPARIN SODIUM 80 MG: 100 INJECTION SUBCUTANEOUS at 09:01

## 2023-12-06 RX ADMIN — LEVETIRACETAM 250 MG: 500 TABLET, FILM COATED ORAL at 08:56

## 2023-12-06 RX ADMIN — MICONAZOLE NITRATE: 2 POWDER TOPICAL at 08:58

## 2023-12-06 RX ADMIN — CALCIUM POLYCARBOPHIL 625 MG: 625 TABLET, FILM COATED ORAL at 08:56

## 2023-12-06 RX ADMIN — INSULIN GLARGINE 30 UNITS: 100 INJECTION, SOLUTION SUBCUTANEOUS at 21:42

## 2023-12-06 RX ADMIN — SODIUM CHLORIDE, PRESERVATIVE FREE 10 ML: 5 INJECTION INTRAVENOUS at 21:42

## 2023-12-06 RX ADMIN — FAMOTIDINE 20 MG: 20 TABLET ORAL at 09:01

## 2023-12-06 RX ADMIN — CHLORHEXIDINE GLUCONATE 0.12% ORAL RINSE 15 ML: 1.2 LIQUID ORAL at 09:04

## 2023-12-06 RX ADMIN — BUMETANIDE 0.5 MG: 0.5 TABLET ORAL at 08:56

## 2023-12-06 RX ADMIN — CHLORHEXIDINE GLUCONATE 0.12% ORAL RINSE 15 ML: 1.2 LIQUID ORAL at 21:42

## 2023-12-06 RX ADMIN — FAMOTIDINE 20 MG: 20 TABLET ORAL at 21:42

## 2023-12-06 RX ADMIN — LEVETIRACETAM 250 MG: 500 TABLET, FILM COATED ORAL at 21:41

## 2023-12-06 RX ADMIN — SODIUM CHLORIDE, PRESERVATIVE FREE 10 ML: 5 INJECTION INTRAVENOUS at 09:02

## 2023-12-06 RX ADMIN — ENOXAPARIN SODIUM 80 MG: 100 INJECTION SUBCUTANEOUS at 21:41

## 2023-12-06 RX ADMIN — MICONAZOLE NITRATE: 2 POWDER TOPICAL at 21:41

## 2023-12-06 RX ADMIN — FOLIC ACID 1 MG: 1 TABLET ORAL at 08:56

## 2023-12-06 ASSESSMENT — PULMONARY FUNCTION TESTS
PIF_VALUE: 15
PIF_VALUE: 14
PIF_VALUE: 15

## 2023-12-06 NOTE — PLAN OF CARE
Problem: Discharge Planning  Goal: Discharge to home or other facility with appropriate resources  12/6/2023 1440 by Gary Jesus RN  Outcome: Progressing  Flowsheets (Taken 12/6/2023 1440)  Discharge to home or other facility with appropriate resources:   Identify barriers to discharge with patient and caregiver   Arrange for needed discharge resources and transportation as appropriate   Identify discharge learning needs (meds, wound care, etc)   Arrange for interpreters to assist at discharge as needed   Refer to discharge planning if patient needs post-hospital services based on physician order or complex needs related to functional status, cognitive ability or social support system     Problem: Chronic Conditions and Co-morbidities  Goal: Patient's chronic conditions and co-morbidity symptoms are monitored and maintained or improved  12/6/2023 1440 by Gary Jesus RN  Outcome: Progressing  Flowsheets (Taken 12/6/2023 1440)  Care Plan - Patient's Chronic Conditions and Co-Morbidity Symptoms are Monitored and Maintained or Improved:   Monitor and assess patient's chronic conditions and comorbid symptoms for stability, deterioration, or improvement   Collaborate with multidisciplinary team to address chronic and comorbid conditions and prevent exacerbation or deterioration   Update acute care plan with appropriate goals if chronic or comorbid symptoms are exacerbated and prevent overall improvement and discharge     Problem: Safety - Adult  Goal: Free from fall injury  12/6/2023 1440 by Gary Jesus RN  Outcome: Progressing  Flowsheets (Taken 12/6/2023 1440)  Free From Fall Injury:   Instruct family/caregiver on patient safety   Based on caregiver fall risk screen, instruct family/caregiver to ask for assistance with transferring infant if caregiver noted to have fall risk factors     Problem: Pain  Goal: Verbalizes/displays adequate comfort level or baseline comfort level  12/6/2023 1440 by Klaudia Ramirez Keny Washington RN  Outcome: Progressing  Flowsheets (Taken 12/6/2023 1440)  Verbalizes/displays adequate comfort level or baseline comfort level:   Encourage patient to monitor pain and request assistance   Assess pain using appropriate pain scale   Administer analgesics based on type and severity of pain and evaluate response   Implement non-pharmacological measures as appropriate and evaluate response   Notify Licensed Independent Practitioner if interventions unsuccessful or patient reports new pain     Problem: Respiratory - Adult  Goal: Achieves optimal ventilation and oxygenation  12/6/2023 1440 by Dayanara Reveles RN  Outcome: Progressing  Flowsheets (Taken 12/6/2023 1440)  Achieves optimal ventilation and oxygenation:   Assess for changes in respiratory status   Assess for changes in mentation and behavior   Position to facilitate oxygenation and minimize respiratory effort   Oxygen supplementation based on oxygen saturation or arterial blood gases     Problem: Neurosensory - Adult  Goal: Achieves stable or improved neurological status  12/6/2023 1440 by Dayanara Reveles RN  Outcome: Progressing  Flowsheets (Taken 12/6/2023 1440)  Achieves stable or improved neurological status:   Assess for and report changes in neurological status   Initiate measures to prevent increased intracranial pressure   Maintain blood pressure and fluid volume within ordered parameters to optimize cerebral perfusion and minimize risk of hemorrhage   Monitor temperature, glucose, and sodium. Initiate appropriate interventions as ordered     Problem: Neurosensory - Adult  Goal: Absence of seizures  12/6/2023 1440 by Dayanara Reveles RN  Outcome: Progressing  Flowsheets (Taken 12/6/2023 1440)  Absence of seizures:   Monitor for seizure activity.   If seizure occurs, document type and location of movements and any associated apnea   If seizure occurs, turn head to side and suction secretions as needed   Administer anticonvulsants as

## 2023-12-06 NOTE — FLOWSHEET NOTE
0800 Mrs Juan F Esquivel rests in the bed without apparent distress. She is unable to follow commands but does withdraw to pain. She remains intubated without sedation. She has an ETT at 24 cm to her lips and an OGT with tube feeding at goal. She has frequent stools with this. She has an external catheter in place. She has an esophageal probe in place. She has a peripheral IV right forearm that flushes well with good blood return.

## 2023-12-06 NOTE — PLAN OF CARE
Problem: Respiratory - Adult  Goal: Achieves optimal ventilation and oxygenation  12/6/2023 0703 by Franc Lazar RCP  Outcome: Not Progressing  PT intubated & ventilated on spontaneous setting. Will wean as tolerated.

## 2023-12-06 NOTE — PROGRESS NOTES
Assessment:  Patient is a 81year old female admitted to ICU for her medical needs. I visited patient to follow up on a previous visit and to do spiritual and emotional assessment for further support. At the time of the visit, patient was alone in her room, intubated. There were no family at this time. Patient is unresponsive. Intervention:  I spoke with patient's day nurse Fuad Acosta) to get an update on the patient and said patient's spouse has not been here yet but could be coming in anytime later on. He also shared with me that the medical team have not seeing any improvement in patient's condition. Fuad Acosta shared with me that family is discerning the next step for a plan of care for patient  but did not know when they would make that decision. I give my phone number to Fuad Acosta to call me when and if patient's  come in to visit this afternoon so I can meet with him to help him discern on a plan of care for patient. Outcome:  Patient was unresponsive, no family present. I offered ministry of presence and prayer for healing. Plan of Care:  Spiritual care will follow up on Thursday to meet with patient spouse regarding their prefer plan of care for patient moving forward. Spiritual care service remain available to patient at this as needed.

## 2023-12-06 NOTE — PLAN OF CARE
Problem: Discharge Planning  Goal: Discharge to home or other facility with appropriate resources  Outcome: Not Progressing  Flowsheets (Taken 12/5/2023 1900)  Discharge to home or other facility with appropriate resources:   Identify barriers to discharge with patient and caregiver   Arrange for needed discharge resources and transportation as appropriate   Identify discharge learning needs (meds, wound care, etc)     Problem: Neurosensory - Adult  Goal: Achieves stable or improved neurological status  Outcome: Not Progressing  Flowsheets (Taken 12/5/2023 1900)  Achieves stable or improved neurological status:   Assess for and report changes in neurological status   Initiate measures to prevent increased intracranial pressure   Maintain blood pressure and fluid volume within ordered parameters to optimize cerebral perfusion and minimize risk of hemorrhage   Monitor temperature, glucose, and sodium.  Initiate appropriate interventions as ordered     Problem: Chronic Conditions and Co-morbidities  Goal: Patient's chronic conditions and co-morbidity symptoms are monitored and maintained or improved  Outcome: Progressing  Flowsheets (Taken 12/5/2023 1900)  Care Plan - Patient's Chronic Conditions and Co-Morbidity Symptoms are Monitored and Maintained or Improved:   Monitor and assess patient's chronic conditions and comorbid symptoms for stability, deterioration, or improvement   Collaborate with multidisciplinary team to address chronic and comorbid conditions and prevent exacerbation or deterioration   Update acute care plan with appropriate goals if chronic or comorbid symptoms are exacerbated and prevent overall improvement and discharge     Problem: Safety - Adult  Goal: Free from fall injury  Outcome: Progressing     Problem: Pain  Goal: Verbalizes/displays adequate comfort level or baseline comfort level  Outcome: Progressing  Flowsheets (Taken 12/5/2023 1900)  Verbalizes/displays adequate comfort level or measures to prevent increased intracranial pressure   Maintain blood pressure and fluid volume within ordered parameters to optimize cerebral perfusion and minimize risk of hemorrhage   Monitor temperature, glucose, and sodium.  Initiate appropriate interventions as ordered

## 2023-12-07 LAB
GLUCOSE BLD STRIP.AUTO-MCNC: 132 MG/DL (ref 70–108)
GLUCOSE BLD STRIP.AUTO-MCNC: 135 MG/DL (ref 70–108)
GLUCOSE BLD STRIP.AUTO-MCNC: 136 MG/DL (ref 70–108)
GLUCOSE BLD STRIP.AUTO-MCNC: 140 MG/DL (ref 70–108)
GLUCOSE BLD STRIP.AUTO-MCNC: 160 MG/DL (ref 70–108)

## 2023-12-07 PROCEDURE — 94761 N-INVAS EAR/PLS OXIMETRY MLT: CPT

## 2023-12-07 PROCEDURE — 6370000000 HC RX 637 (ALT 250 FOR IP): Performed by: NURSE PRACTITIONER

## 2023-12-07 PROCEDURE — 2580000003 HC RX 258: Performed by: NURSE PRACTITIONER

## 2023-12-07 PROCEDURE — 94003 VENT MGMT INPAT SUBQ DAY: CPT

## 2023-12-07 PROCEDURE — 6360000002 HC RX W HCPCS: Performed by: INTERNAL MEDICINE

## 2023-12-07 PROCEDURE — 82948 REAGENT STRIP/BLOOD GLUCOSE: CPT

## 2023-12-07 PROCEDURE — 2700000000 HC OXYGEN THERAPY PER DAY

## 2023-12-07 PROCEDURE — 99291 CRITICAL CARE FIRST HOUR: CPT | Performed by: INTERNAL MEDICINE

## 2023-12-07 PROCEDURE — 6370000000 HC RX 637 (ALT 250 FOR IP): Performed by: INTERNAL MEDICINE

## 2023-12-07 PROCEDURE — 2000000000 HC ICU R&B

## 2023-12-07 RX ADMIN — ENOXAPARIN SODIUM 80 MG: 100 INJECTION SUBCUTANEOUS at 20:41

## 2023-12-07 RX ADMIN — MICONAZOLE NITRATE: 2 POWDER TOPICAL at 20:42

## 2023-12-07 RX ADMIN — ENOXAPARIN SODIUM 80 MG: 100 INJECTION SUBCUTANEOUS at 08:02

## 2023-12-07 RX ADMIN — CHLORHEXIDINE GLUCONATE 0.12% ORAL RINSE 15 ML: 1.2 LIQUID ORAL at 20:41

## 2023-12-07 RX ADMIN — CHLORHEXIDINE GLUCONATE 0.12% ORAL RINSE 15 ML: 1.2 LIQUID ORAL at 08:03

## 2023-12-07 RX ADMIN — INSULIN GLARGINE 30 UNITS: 100 INJECTION, SOLUTION SUBCUTANEOUS at 20:41

## 2023-12-07 RX ADMIN — MODAFINIL 200 MG: 100 TABLET ORAL at 08:02

## 2023-12-07 RX ADMIN — FOLIC ACID 1 MG: 1 TABLET ORAL at 07:53

## 2023-12-07 RX ADMIN — BUMETANIDE 0.5 MG: 0.5 TABLET ORAL at 07:53

## 2023-12-07 RX ADMIN — SODIUM CHLORIDE, PRESERVATIVE FREE 10 ML: 5 INJECTION INTRAVENOUS at 20:42

## 2023-12-07 RX ADMIN — FAMOTIDINE 20 MG: 20 TABLET ORAL at 08:02

## 2023-12-07 RX ADMIN — CALCIUM POLYCARBOPHIL 625 MG: 625 TABLET, FILM COATED ORAL at 07:52

## 2023-12-07 RX ADMIN — LEVETIRACETAM 250 MG: 500 TABLET, FILM COATED ORAL at 20:41

## 2023-12-07 RX ADMIN — MICONAZOLE NITRATE: 2 POWDER TOPICAL at 07:54

## 2023-12-07 RX ADMIN — LEVETIRACETAM 250 MG: 500 TABLET, FILM COATED ORAL at 07:53

## 2023-12-07 RX ADMIN — FAMOTIDINE 20 MG: 20 TABLET ORAL at 20:41

## 2023-12-07 RX ADMIN — SODIUM CHLORIDE, PRESERVATIVE FREE 10 ML: 5 INJECTION INTRAVENOUS at 07:55

## 2023-12-07 ASSESSMENT — PULMONARY FUNCTION TESTS
PIF_VALUE: 15
PIF_VALUE: 16
PIF_VALUE: 15
PIF_VALUE: 15

## 2023-12-07 ASSESSMENT — PAIN SCALES - GENERAL: PAINLEVEL_OUTOF10: 0

## 2023-12-07 NOTE — FLOWSHEET NOTE
0800 Mrs Hazel Lombardo rests in the bed without apparent distress. She is unable to follow commands but does withdraw from pain. She remains intubated without sedation. She has an ETT at 24 cm to her lips and an OGT with tube feeding at goal (Glucerna at 40 ml/hr). She has frequent stools with this. She has an external catheter in place. She has an esophageal probe in place. She has a peripheral IV right forearm that flushes well with good blood return. OK to remove this when appropriate and not replace per Dr Yonis Johnson. She continues to have copious oral secretions.

## 2023-12-07 NOTE — PLAN OF CARE
Problem: Respiratory - Adult  Goal: Normal spontaneous ventilation  Outcome: Progressing     Problem: Respiratory - Adult  Goal: Achieves optimal ventilation and oxygenation  12/7/2023 0502 by Ortiz Lew RCP  Outcome: Progressing                                                  Patient Weaning Progress    The patient's vent settings was not able to be weaned this shift. Ventilator settings that were weaned              [] Mode   [] Pressure support weaned   [] Fio2 weaned   [] Peep weaned      Spontaneous weaning trial  was attempted. Patient has continued on CPAP. *Results of SBT documented under SBTOUTCOME note. Ventilator spontaneous breathing trial outcome:                [x] Tolerated SBT and vitals are stable   [] Order to extubate entered by provider    [] SBT not tolerated    [] Respiratory distress    [] Decreased LOC    [] Vitals unstable    [] Agitation  [] Provider request      The patient was able to tolerate SBT. RSBI  was 36 with EtCO2 of 43 and SpO2 of 99 on 30% FiO2. Spontanteous VT was 469 and RR 17 breaths/min. Evac tube was  hooked up with continuous low suction(20-30mmHg)      Cuff was not  deflated to determine cuff leak. Unable to get agreement for goals because no family is present and patient cannot respond.

## 2023-12-07 NOTE — CARE COORDINATION
12/7/23, 2:45 PM EST    DISCHARGE ON GOING EVALUATION    Belinda Bedoya 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 There is also nonocclusive thrombus within the left posterior tibial vein  10/16 Extubated to bipap  10/16 Code Blue  10/16 Reintubated  10/16 Bronch: thick bloody secretions in RLL removed  10/16 CXR: Mild stable cardiomegaly with pulmonary vascular congestion suspicious for congestive heart failure; Prominent pulmonary interstitium suspicious for pulmonary edema  10/17 5 hr EEG: No seizures noted; diffuse background attenuation and suppression without evidence of reactivity; severe encephalopathy  10/17 CT Head: Stable CT appearance the brain. No acute findings  10/17 MRI Brain: No acute findings  77/07 PICC left basilic - 06/2 PICC removed  10/18 CT Head: No acute findings  10/18 OR: ET Exchange bronhoscopy and lavage  10/19 4hr 40 min EEG: This EEG was abnormal. The background abnormalities indicated a moderate to severe encephalopathy, nonspecific to etiology. The generalized periodic discharges (GPDs) indicated underlying cortical irritability/increased risk of seizures, and can be seen in various encephalopathies. No seizures were recorded  10/19 MRI Brain: No evidence of an acute infarct. No evidence of anoxia; Stable mild severity chronic small vessel ischemic changes; Global volume loss  10/23 4-day EEG: Moderate to severe encephalopathy, improved as recording progressed.  Frequent and at times near continuous runs of GPDs with often typical and at times

## 2023-12-07 NOTE — PLAN OF CARE
without S/S of infection  12/7/2023 0214 by Abbey Pantoja RN  Outcome: Progressing  Flowsheets  Taken 12/6/2023 2232  Incisions, Wounds, or Drain Sites Healing Without Sign and Symptoms of Infection: TWICE DAILY: Assess and document skin integrity  Taken 12/6/2023 2000  Incisions, Wounds, or Drain Sites Healing Without Sign and Symptoms of Infection: TWICE DAILY: Assess and document skin integrity     Problem: Musculoskeletal - Adult  Goal: Maintain proper alignment of affected body part  Recent Flowsheet Documentation  Taken 12/6/2023 1440 by Isabel Serrano RN  Maintain proper alignment of affected body part:   Support and protect limb and body alignment per provider's orders   Instruct and reinforce with patient and family use of appropriate assistive device and precautions (e.g. spinal or hip dislocation precautions)     Problem: Gastrointestinal - Adult  Goal: Minimal or absence of nausea and vomiting  Recent Flowsheet Documentation  Taken 12/6/2023 2000 by Abbey Pantoja RN  Minimal or absence of nausea and vomiting: Administer IV fluids as ordered to ensure adequate hydration     Problem: Gastrointestinal - Adult  Goal: Maintains adequate nutritional intake  12/7/2023 0214 by Abbey Pantoja RN  Outcome: Progressing  Flowsheets (Taken 12/6/2023 2000)  Maintains adequate nutritional intake: Monitor percentage of each meal consumed     Problem: Genitourinary - Adult  Goal: Urinary catheter remains patent  Recent Flowsheet Documentation  Taken 12/6/2023 2000 by Abbey Pantoja RN  Urinary catheter remains patent: Assess patency of urinary catheter     Problem: Cardiovascular - Adult  Goal: Maintains optimal cardiac output and hemodynamic stability  12/7/2023 0214 by Abbey Pantoja RN  Outcome: Progressing  Flowsheets (Taken 12/6/2023 2000)  Maintains optimal cardiac output and hemodynamic stability:   Monitor blood pressure and heart rate   Monitor urine output and notify Licensed Independent Practitioner for values outside of normal range   Assess for signs of decreased cardiac output     Problem: Musculoskeletal - Adult  Goal: Maintain proper alignment of affected body part  12/7/2023 0214 by Michael Goetz RN  Outcome: Not Progressing     Problem: Nutrition Deficit:  Goal: Optimize nutritional status  12/7/2023 0214 by Michael Goetz RN  Outcome: Progressing     Problem: Skin/Tissue Integrity  Goal: Absence of new skin breakdown  Description: 1. Monitor for areas of redness and/or skin breakdown  2. Assess vascular access sites hourly  3. Every 4-6 hours minimum:  Change oxygen saturation probe site  4. Every 4-6 hours:  If on nasal continuous positive airway pressure, respiratory therapy assess nares and determine need for appliance change or resting period. 12/7/2023 0214 by Michael Goetz RN  Outcome: Progressing     Problem: Coping  Goal: Pt/Family able to verbalize concerns and demonstrate effective coping strategies  Description: INTERVENTIONS:  1. Assist patient/family to identify coping skills, available support systems and cultural and spiritual values  2. Provide emotional support, including active listening and acknowledgement of concerns of patient and caregivers  3. Reduce environmental stimuli, as able  4. Instruct patient/family in relaxation techniques, as appropriate  5.  Assess for spiritual pain/suffering and initiate Spiritual Care, Psychosocial Clinical Specialist consults as needed  12/7/2023 0214 by Michael Goetz RN  Outcome: Not Progressing  Flowsheets (Taken 12/6/2023 2000)  Patient/family able to verbalize anxieties, fears, and concerns, and demonstrate effective coping:   Assist patient/family to identify coping skills, available support systems and cultural and spiritual values   Provide emotional support, including active listening and acknowledgement of concerns of patient and caregivers     Problem: Psychosocial Clinical Specialist consults as needed  12/7/2023 0214 by Brea Balderas RN  Outcome: Not Progressing  Flowsheets (Taken 12/6/2023 2000)  Patient/family able to verbalize anxieties, fears, and concerns, and demonstrate effective coping:   Assist patient/family to identify coping skills, available support systems and cultural and spiritual values   Provide emotional support, including active listening and acknowledgement of concerns of patient and caregivers  12/6/2023 1440 by Noé Pichardo RN  Outcome: Progressing  Flowsheets (Taken 12/6/2023 1440)  Patient/family able to verbalize anxieties, fears, and concerns, and demonstrate effective coping:   Assist patient/family to identify coping skills, available support systems and cultural and spiritual values   Provide emotional support, including active listening and acknowledgement of concerns of patient and caregivers   Reduce environmental stimuli, as able   Instruct patient/family in relaxation techniques, as appropriate   Assess for spiritual pain/suffering and initiate Spiritual Care, Psychosocial Clinical Specialist consults as needed     Problem: Musculoskeletal - Adult  Goal: Maintain proper alignment of affected body part  12/7/2023 0214 by Brea Balderas RN  Outcome: Not Progressing  12/6/2023 1440 by Noé Pichardo RN  Outcome: Progressing  Flowsheets (Taken 12/6/2023 1440)  Maintain proper alignment of affected body part:   Support and protect limb and body alignment per provider's orders   Instruct and reinforce with patient and family use of appropriate assistive device and precautions (e.g. spinal or hip dislocation precautions)   Care plan reviewed with patient. Patient unable to verbalize and no family present to verbalize understanding of the plan of care and contribute to goal setting.

## 2023-12-07 NOTE — PLAN OF CARE
Problem: Discharge Planning  Goal: Discharge to home or other facility with appropriate resources  12/7/2023 1047 by Edd Rushing RN  Outcome: Progressing  Flowsheets (Taken 12/7/2023 1047)  Discharge to home or other facility with appropriate resources:   Identify barriers to discharge with patient and caregiver   Identify discharge learning needs (meds, wound care, etc)   Refer to discharge planning if patient needs post-hospital services based on physician order or complex needs related to functional status, cognitive ability or social support system     Problem: Chronic Conditions and Co-morbidities  Goal: Patient's chronic conditions and co-morbidity symptoms are monitored and maintained or improved  12/7/2023 1047 by Edd Rushing RN  Outcome: Progressing  Flowsheets (Taken 12/7/2023 1047)  Care Plan - Patient's Chronic Conditions and Co-Morbidity Symptoms are Monitored and Maintained or Improved:   Monitor and assess patient's chronic conditions and comorbid symptoms for stability, deterioration, or improvement   Collaborate with multidisciplinary team to address chronic and comorbid conditions and prevent exacerbation or deterioration   Update acute care plan with appropriate goals if chronic or comorbid symptoms are exacerbated and prevent overall improvement and discharge     Problem: Safety - Adult  Goal: Free from fall injury  12/7/2023 1047 by Edd Rushing RN  Outcome: Progressing  Flowsheets (Taken 12/7/2023 1047)  Free From Fall Injury:   Instruct family/caregiver on patient safety   Based on caregiver fall risk screen, instruct family/caregiver to ask for assistance with transferring infant if caregiver noted to have fall risk factors     Problem: Pain  Goal: Verbalizes/displays adequate comfort level or baseline comfort level  12/7/2023 1047 by Edd Rushing RN  Outcome: Progressing  Flowsheets (Taken 12/7/2023 1047)  Verbalizes/displays adequate comfort level or baseline comfort RN  Outcome: Progressing  Flowsheets (Taken 12/7/2023 1047)  Achieves stable or improved neurological status:   Assess for and report changes in neurological status   Initiate measures to prevent increased intracranial pressure   Maintain blood pressure and fluid volume within ordered parameters to optimize cerebral perfusion and minimize risk of hemorrhage  12/7/2023 0214 by Franco Rahman RN  Outcome: Not Progressing  Flowsheets (Taken 12/6/2023 2000)  Achieves stable or improved neurological status:   Assess for and report changes in neurological status   Initiate measures to prevent increased intracranial pressure   Maintain blood pressure and fluid volume within ordered parameters to optimize cerebral perfusion and minimize risk of hemorrhage   Monitor temperature, glucose, and sodium. Initiate appropriate interventions as ordered     Problem: Coping  Goal: Pt/Family able to verbalize concerns and demonstrate effective coping strategies  Description: INTERVENTIONS:  1. Assist patient/family to identify coping skills, available support systems and cultural and spiritual values  2. Provide emotional support, including active listening and acknowledgement of concerns of patient and caregivers  3. Reduce environmental stimuli, as able  4. Instruct patient/family in relaxation techniques, as appropriate  5.  Assess for spiritual pain/suffering and initiate Spiritual Care, Psychosocial Clinical Specialist consults as needed  12/7/2023 1047 by Gentry Jaramillo RN  Outcome: Progressing  Flowsheets (Taken 12/7/2023 1047)  Patient/family able to verbalize anxieties, fears, and concerns, and demonstrate effective coping:   Assist patient/family to identify coping skills, available support systems and cultural and spiritual values   Provide emotional support, including active listening and acknowledgement of concerns of patient and caregivers   Reduce environmental stimuli, as able  12/7/2023 0214 by Ludwig Liu

## 2023-12-07 NOTE — PLAN OF CARE
Problem: Respiratory - Adult  Goal: Achieves optimal ventilation and oxygenation  12/7/2023 1327 by Tarsha Kennedy RCP  Outcome: Progressing  Patient remains on the ventilator, weaning as tolerated. Patient Weaning Progress    The patient's vent settings was not able to be weaned this shift. Ventilator settings that were weaned              [] Mode   [] Pressure support weaned   [] Fio2 weaned   [] Peep weaned      Spontaneous weaning trial  was attempted. Cuff was not  deflated to determine cuff leak. Unable to get agreement for goals because no family is present and patient cannot respond.

## 2023-12-07 NOTE — PROGRESS NOTES
Patient:  Fabiola Osorio    Unit/Bed:4D-08/008-A  MRN: [id number]   PCP: Yolande Reveles MD  Date of Admission: 10/12/2023    Assessment and Plan(All pulmonary edema, renal failure, PE, and respiratory failure diagnoses are acute in nature unless otherwise specified): Anoxic Brain Injury: Highest GCS obtained is 8. Mostly stays at GCS 5. GCS did reach 7 on 11/27/23 which represents an isolated event. GCS 5 remains the baseline. Patient continues with ventilator support to allow time to determine extent of recovery. Injury is severe persistent vegetative state. Multiple EEGs and continuous EEG show no seizure activity. EEG is consistent with severe encephalopathy. MRI and CT head show no structural injury. Patient did not qualify for hypothermia therapy as she had respiratory arrest leading to cardiac arrest.  There is no data to show recovery benefit in this population. She did receive hypertonic saline to minimize risk of cerebral edema keeping serum sodium 145-150. Patient on Provigil for neuro-stimulation. On 11/28/23, Keppra dose to 250 mg bid. Look to discontinue Keppra if no seizures by 12/12/23. EEG completed 11/16/23 shows ongoing cortical dysfunction. Seems to have reached plateau of neurologic recovery. GCS 5 is the baseline. Nurses documenting GCS 7-9. I have not seen this level. The best level I can achieve is GCS 6 (1 for no motor response, 1 for non verbal, and 4 for spontaneous eye opening:  Mostly eye opening is to sound or touch)  Continue to monitor for neurologic recovery. Acute Respiratory Failure: Intubated with difficulty on 10/16/23 secondary to complete airway collapse with no capacity for air exchange. What air was in the lungs was trapped in the lungs. The amount of laryngeal edema was intense and required a stylette to penetrate the edema and secure the airway. Patient remains on mechanical ventilator for airway protection.   Continue with ventilator

## 2023-12-08 ENCOUNTER — APPOINTMENT (OUTPATIENT)
Dept: MRI IMAGING | Age: 73
DRG: 163 | End: 2023-12-08
Attending: OTOLARYNGOLOGY
Payer: MEDICARE

## 2023-12-08 LAB
GLUCOSE BLD STRIP.AUTO-MCNC: 102 MG/DL (ref 70–108)
GLUCOSE BLD STRIP.AUTO-MCNC: 118 MG/DL (ref 70–108)
GLUCOSE BLD STRIP.AUTO-MCNC: 99 MG/DL (ref 70–108)

## 2023-12-08 PROCEDURE — 2000000000 HC ICU R&B

## 2023-12-08 PROCEDURE — 2580000003 HC RX 258: Performed by: NURSE PRACTITIONER

## 2023-12-08 PROCEDURE — 6370000000 HC RX 637 (ALT 250 FOR IP): Performed by: NURSE PRACTITIONER

## 2023-12-08 PROCEDURE — 82948 REAGENT STRIP/BLOOD GLUCOSE: CPT

## 2023-12-08 PROCEDURE — 95718 EEG PHYS/QHP 2-12 HR W/VEEG: CPT | Performed by: PSYCHIATRY & NEUROLOGY

## 2023-12-08 PROCEDURE — 95711 VEEG 2-12 HR UNMONITORED: CPT

## 2023-12-08 PROCEDURE — 94003 VENT MGMT INPAT SUBQ DAY: CPT

## 2023-12-08 PROCEDURE — 70551 MRI BRAIN STEM W/O DYE: CPT

## 2023-12-08 PROCEDURE — 99233 SBSQ HOSP IP/OBS HIGH 50: CPT | Performed by: INTERNAL MEDICINE

## 2023-12-08 PROCEDURE — 6360000002 HC RX W HCPCS: Performed by: INTERNAL MEDICINE

## 2023-12-08 PROCEDURE — 94761 N-INVAS EAR/PLS OXIMETRY MLT: CPT

## 2023-12-08 PROCEDURE — 2700000000 HC OXYGEN THERAPY PER DAY

## 2023-12-08 PROCEDURE — 99024 POSTOP FOLLOW-UP VISIT: CPT | Performed by: REGISTERED NURSE

## 2023-12-08 PROCEDURE — 6370000000 HC RX 637 (ALT 250 FOR IP): Performed by: INTERNAL MEDICINE

## 2023-12-08 RX ADMIN — MODAFINIL 200 MG: 100 TABLET ORAL at 08:01

## 2023-12-08 RX ADMIN — MICONAZOLE NITRATE: 2 POWDER TOPICAL at 08:02

## 2023-12-08 RX ADMIN — CHLORHEXIDINE GLUCONATE 0.12% ORAL RINSE 15 ML: 1.2 LIQUID ORAL at 08:03

## 2023-12-08 RX ADMIN — MICONAZOLE NITRATE: 2 POWDER TOPICAL at 20:54

## 2023-12-08 RX ADMIN — FAMOTIDINE 20 MG: 20 TABLET ORAL at 08:01

## 2023-12-08 RX ADMIN — LEVETIRACETAM 250 MG: 500 TABLET, FILM COATED ORAL at 20:56

## 2023-12-08 RX ADMIN — SODIUM CHLORIDE, PRESERVATIVE FREE 10 ML: 5 INJECTION INTRAVENOUS at 20:54

## 2023-12-08 RX ADMIN — BUMETANIDE 0.5 MG: 0.5 TABLET ORAL at 08:02

## 2023-12-08 RX ADMIN — ENOXAPARIN SODIUM 80 MG: 100 INJECTION SUBCUTANEOUS at 20:54

## 2023-12-08 RX ADMIN — INSULIN GLARGINE 30 UNITS: 100 INJECTION, SOLUTION SUBCUTANEOUS at 20:54

## 2023-12-08 RX ADMIN — LEVETIRACETAM 250 MG: 500 TABLET, FILM COATED ORAL at 08:01

## 2023-12-08 RX ADMIN — FAMOTIDINE 20 MG: 20 TABLET ORAL at 20:54

## 2023-12-08 RX ADMIN — SODIUM CHLORIDE, PRESERVATIVE FREE 10 ML: 5 INJECTION INTRAVENOUS at 08:03

## 2023-12-08 RX ADMIN — FOLIC ACID 1 MG: 1 TABLET ORAL at 08:02

## 2023-12-08 RX ADMIN — ENOXAPARIN SODIUM 80 MG: 100 INJECTION SUBCUTANEOUS at 08:01

## 2023-12-08 RX ADMIN — CHLORHEXIDINE GLUCONATE 0.12% ORAL RINSE 15 ML: 1.2 LIQUID ORAL at 20:54

## 2023-12-08 ASSESSMENT — PULMONARY FUNCTION TESTS
PIF_VALUE: 15
PIF_VALUE: 16
PIF_VALUE: 15

## 2023-12-08 NOTE — PLAN OF CARE
Problem: Discharge Planning  Goal: Discharge to home or other facility with appropriate resources  Outcome: Progressing  Flowsheets (Taken 12/7/2023 2000)  Discharge to home or other facility with appropriate resources: Identify barriers to discharge with patient and caregiver     Problem: Chronic Conditions and Co-morbidities  Goal: Patient's chronic conditions and co-morbidity symptoms are monitored and maintained or improved  Outcome: Progressing     Problem: Safety - Adult  Goal: Free from fall injury  Outcome: Progressing  Note: Falling star program remains in place. Call light and personal belongings within reach. Frequent visual monitoring continues. Toileting program inplace. Patient assisted in turning/repositioning at least once every 2 hours, and on a prn basis. Problem: Pain  Goal: Verbalizes/displays adequate comfort level or baseline comfort level  Outcome: Progressing  Note: Continuing to monitor pain and discomfort. Monitoring pain level on scale of 0-10. Non- pharmacological measures encouraged to reduce discomfort/pain. PRN pain meds administeration continues when/if applicable as ordered by physician.         Problem: Respiratory - Adult  Goal: Normal spontaneous ventilation  12/7/2023 1327 by Rosey Smallwood RCP  Outcome: Progressing  Goal: Achieves optimal ventilation and oxygenation  12/8/2023 0220 by Alessandra Casper RN  Outcome: Progressing  12/7/2023 1327 by Rosey Smallwood RCP  Outcome: Progressing     Problem: Neurosensory - Adult  Goal: Achieves stable or improved neurological status  Outcome: Progressing  Goal: Absence of seizures  Outcome: Progressing     Problem: Skin/Tissue Integrity - Adult  Goal: Incisions, wounds, or drain sites healing without S/S of infection  Outcome: Progressing     Problem: Gastrointestinal - Adult  Goal: Maintains adequate nutritional intake  Outcome: Progressing     Problem: Cardiovascular - Adult  Goal: Maintains optimal cardiac output

## 2023-12-08 NOTE — PLAN OF CARE
Problem: Respiratory - Adult  Goal: Normal spontaneous ventilation  Outcome: Not Progressing     Problem: Respiratory - Adult  Goal: Achieves optimal ventilation and oxygenation  12/8/2023 0440 by Satish Anne RCP  Outcome: Not Progressing                                                Patient Weaning Progress    The patient's vent settings was not able to be weaned this shift. Ventilator settings that were weaned              [] Mode   [] Pressure support weaned   [] Fio2 weaned   [] Peep weaned      Spontaneous weaning trial  was attempted. The patient was able to tolerate SBT. RSBI  was 33.11 with EtCO2 of 42 and SpO2 of 99 on 30% FiO2. Spontanteous VT was 453 and RR 15 breaths/min. Evac tube was  hooked up with continuous low suction(20-30mmHg)      Cuff was not  deflated to determine cuff leak. Unable to get agreement for goals because no family is present and patient cannot respond.

## 2023-12-08 NOTE — PLAN OF CARE
Problem: Respiratory - Adult  Goal: Normal spontaneous ventilation  12/8/2023 0738 by Gilberto Danielle RCP  Outcome: Progressing  Goal: Achieves optimal ventilation and oxygenation  12/8/2023 0738 by Gilberto Danielle RCP  Outcome: Progressing    Patient is continued on SBT and tolerating well. Unable to get agreement for goals because no family is present and patient cannot respond.

## 2023-12-08 NOTE — PROCEDURES
Date: 12/8/2023  Referring physician: Rosa Ramsey MD    Indication  Patient aged 81 y with encephalopathy. EEG done to assess for epileptiform activity. Introduction  This 4 hrs vEEG was recorded using the [employer] 10-20 System on a [employer] workstation at 256 samples/s. Automated spike and seizure detection algorithms were applied. Description  The background consistent of diffuse none reactive polymorphic delta slowing with brief periods of attenuation. intermixed with blink artifact. . No consistent focal slowing or interhemispheric asymmetry was noted. Stage I and stage II sleep were not observed. There were no interictal epileptiform discharges or electrographic seizures. Activations  Hyperventilation was not performed. Intermittent photic stimulation was performed and demonstrated no posterior driving response. Impression  Abnormal awake EEG. The slowing mentioned above suggests severe non specific encephalopathy. No epileptiform discharges were identified. Please note the absence of such activity on this record cannot conclusively rule out an epileptic disorder. If such is still clinically suspected, a repeat study with sleep deprivation and/or prolonged sampling may be helpful. Ann Bradford MD  Epilepsy Board Certified. Neurology Board Certified.     Electronically Signed

## 2023-12-08 NOTE — PROGRESS NOTES
Patient:  [facility]    Unit/Bed:4D-08/008-A  MRN: [id number]   PCP: Prem Salcido MD  Date of Admission: 10/12/2023    Assessment and Plan(All pulmonary edema, renal failure, PE, and respiratory failure diagnoses are acute in nature unless otherwise specified): Anoxic Brain Injury: Highest GCS obtained is 8. Mostly stays at GCS 5. GCS did reach 7 on 11/27/23 which represents an isolated event. GCS 5 remains the baseline. Patient continues with ventilator support to allow time to determine extent of recovery. Injury is severe persistent vegetative state. Multiple EEGs and continuous EEG show no seizure activity. EEG is consistent with severe encephalopathy. MRI and CT head show no structural injury. Patient did not qualify for hypothermia therapy as she had respiratory arrest leading to cardiac arrest.  There is no data to show recovery benefit in this population. She did receive hypertonic saline to minimize risk of cerebral edema keeping serum sodium 145-150. Patient on Provigil for neuro-stimulation. On 11/28/23, Keppra dose to 250 mg bid. Look to discontinue Keppra if no seizures by 12/12/23. EEG completed 11/16/23 shows ongoing cortical dysfunction. Seems to have reached plateau of neurologic recovery. GCS 5 is the baseline. Nurses documenting GCS 7-9. I have not seen this level. The best level I can achieve is GCS 6 (1 for no motor response, 1 for non verbal, and 4 for spontaneous eye opening:  Mostly eye opening is to touch)  Continue to monitor for neurologic recovery. Acute Respiratory Failure: Intubated with difficulty on 10/16/23 secondary to complete airway collapse with no capacity for air exchange. What air was in the lungs was trapped in the lungs. The amount of laryngeal edema was intense and required a stylette to penetrate the edema and secure the airway. Patient remains on mechanical ventilator for airway protection. Continue with ventilator support. Family reports patient would not want tracheostomy tube again-ever. No plans for extubation at this time. My discussion with  on 11/17/23 indicates they would consider tracheostomy tube to extend time to recover. I met with the family on 12/1/23 and discussed her condition. They want a copy of the medical records to have an expert to review. Once this process is complete, they will decide further steps. Post Cricotracheal resection with laryngoplasty: 10/12/23. Dr. Darling Barriga following. Debridement completed 11/16/23. Bilateral DVT: On heparin. Right DVT has resolved on venous doppler completed 11/1/23. Left DVTs persist.  On therapeutic Lovenox. PICC line removed 12/5/23. DM II: On Lantus insulin with SSI. Diastolic Heart Failure: Echo 1/2/23 shows EF 60%. On diuretics. HTN: Currently on Bumex. Anemia: H/H stable. Edema:  Diurese. Nutrition: On TF. GI prophylaxis: Pepcid. Hypotension:  Resolved. .  Tongue biting:  Primarily left sided. Repeat 4 hour EEG on 11/16/23 showed no seizures. Resolved. CC:  Anoxic Brain Injury  HPI: Patient is a 81year old white female nonsmoker. She has a history of CAD with stenting in 2008. She has degenerative arthritis, diabetic neuropathy with bilateral feet numbness at baseline. She has type II DM, previous PE in January 2023, hyperlipidemia, and HTN. Patient has history of COVID-19 with associated respiratory failure requiring intubation and subsequent tracheosotmy tube placement. She developed tracheal stenosis. Repeated laryngoscopy with dilation did not sustain improvement. She was admitted to [facility] on 10/12/23 and underwnet cricotracheal resection with laryngoplasty. She failed post-operative extubation due to airway inflammation. She was maintained on mechanical ventilator until extubation trial 10/16.   At extubation, she fully collapsed her airway trapping air in the chest.  She could not exchange respirations meaning she could vegetative state. Lymph:  No supraclavicular adenopathy. Neurologic: No posturing. No seizures. Will open eyes to touch. No focus of pupils and no tracking. No DTR bilaterally. Persistent up going babinski. No clonus. Patient will yawn and cough spontaneously. Oculocephalic reflexes intact. Rooting reflex present. Data: (All radiographs, tracings, PFTs, and imaging are personally viewed and interpreted unless otherwise noted). Venous Doppler report 11/1/23: Left axillary vein acute thrombus. Chronic DVT left subclavian vein. Telemetry shows sinus rhythm. EEG 12/8/23 pending  MRI 12/8/23 pending. Glucose 102. CC time 35 minutes. Time was discontiguous and does not include procedures. Electronically signed by Pippa Arauz M.D.

## 2023-12-08 NOTE — PROGRESS NOTES
Comprehensive Nutrition Assessment    Type and Reason for Visit:  Reassess (Tube Feed Monitor)    Nutrition Recommendations/Plan:   Continue Glucerna 1.5 at goal of 40ml/hour  Continue 250ml free H20 every 6h as ordered 12/5 per Physician     Malnutrition Assessment:  Malnutrition Status: At risk for malnutrition (Comment) (11/20/23 1529)    Context:  Chronic Illness     Findings of the 6 clinical characteristics of malnutrition:  Energy Intake:  No significant decrease in energy intake (pt. tolerating TF since admit)  Weight Loss:  Greater than 10% over 6 months (31# or 16% in 5 months)     Body Fat Loss:  No significant body fat loss     Muscle Mass Loss:  No significant muscle mass loss Temples (temporalis), Clavicles (pectoralis & deltoids)  Fluid Accumulation:  Mild     Strength:  Not Performed    Nutrition Assessment:      Pt. stable from a nutritional standpoint AEB tolerating TF at goal while NPO as intubated. Remains at risk for further nutritional compromise r/t intubated s/p ENT surgery 10/12, admit d/t tracheal stenosis from trach placed 4/2022, complex COVID Hx; s/p code blue 10/16 - anoxic brain injury, increased nutrient needs to aid in wound healing, debridement 11/16,DVTs, LOS day 57 and underlying medical condition (DM - A1C 5.5% 1/2023, CAD,THR 2/2023).         Nutrition Related Findings:    Pt. Report/Treatments/Miscellaneous: pt. Seen; intubated; tolerating TF at goal; EEG today; persistent vegetative state; family mtg re: POC  GI Status: BM x2 12/8  Pertinent Labs: glucose 118  BMP of 12/6 includes: BUN 29  creatinine 0.5  Na 140  K+ 4.3    Pertinent Meds: bumex, folic acid, lantus, keppra, pepcid, fibercon     Wound Type: Pressure Injury, Stage II (coccyx; stage 1 buttocks;  skin tear abdomen; 10/12 ENT surgery: Cricotracheal Resection, Laryngoplasty with Flap and Vocal Fold Lateralization Stitch Placement; debridement completed 11/16)       Current Nutrition Intake & Therapies:    Average monitor while inpatient, Interdisciplinary Rounds       Goals:  Previous Goal Met: Progressing toward Goal(s)  Goals: Tolerate nutrition support at goal rate, by next RD assessment       Nutrition Monitoring and Evaluation:      Food/Nutrient Intake Outcomes: Enteral Nutrition Intake/Tolerance  Physical Signs/Symptoms Outcomes: Biochemical Data, Chewing or Swallowing, GI Status, Fluid Status or Edema, Hemodynamic Status, Nutrition Focused Physical Findings, Skin, Weight    Discharge Planning:     Too soon to determine     Heriberto Trotter RD, LD  Contact: [phone]

## 2023-12-08 NOTE — PLAN OF CARE
Problem: Discharge Planning  Goal: Discharge to home or other facility with appropriate resources  12/8/2023 0947 by Dereck Doll RN  Outcome: Not Progressing     Problem: Chronic Conditions and Co-morbidities  Goal: Patient's chronic conditions and co-morbidity symptoms are monitored and maintained or improved  12/8/2023 0947 by Dereck Doll RN  Outcome: Progressing     Problem: Safety - Adult  Goal: Free from fall injury  12/8/2023 0947 by Dereck Doll RN  Outcome: Progressing     Problem: Pain  Goal: Verbalizes/displays adequate comfort level or baseline comfort level  12/8/2023 0947 by Dereck Doll RN  Outcome: Progressing     Problem: Respiratory - Adult  Goal: Achieves optimal ventilation and oxygenation  12/8/2023 0947 by Dereck Doll RN  Outcome: Progressing     Problem: Neurosensory - Adult  Goal: Achieves stable or improved neurological status  12/8/2023 0947 by Dereck Doll RN  Outcome: Not Progressing     Problem: Neurosensory - Adult  Goal: Absence of seizures  12/8/2023 0947 by Dereck Doll RN  Outcome: Progressing     Problem: Skin/Tissue Integrity - Adult  Goal: Incisions, wounds, or drain sites healing without S/S of infection  12/8/2023 0947 by Dereck Doll RN  Outcome: Progressing     Problem: Gastrointestinal - Adult  Goal: Maintains adequate nutritional intake  12/8/2023 0947 by Dereck Doll RN  Outcome: Progressing     Problem: Cardiovascular - Adult  Goal: Maintains optimal cardiac output and hemodynamic stability  12/8/2023 0947 by Dereck Doll RN  Outcome: Progressing     Problem: Musculoskeletal - Adult  Goal: Maintain proper alignment of affected body part  12/8/2023 0947 by Dereck Doll RN  Outcome: Progressing     Problem: Nutrition Deficit:  Goal: Optimize nutritional status  12/8/2023 0947 by Dereck Doll RN  Outcome: Progressing     Problem: Skin/Tissue Integrity  Goal: Absence of new skin breakdown  Description: 1. Monitor for areas of redness and/or skin breakdown  2. Assess vascular access sites hourly  3. Every 4-6 hours minimum:  Change oxygen saturation probe site  4. Every 4-6 hours:  If on nasal continuous positive airway pressure, respiratory therapy assess nares and determine need for appliance change or resting period. 12/8/2023 0947 by James Vera RN  Outcome: Progressing     Problem: Coping  Goal: Pt/Family able to verbalize concerns and demonstrate effective coping strategies  Description: INTERVENTIONS:  1. Assist patient/family to identify coping skills, available support systems and cultural and spiritual values  2. Provide emotional support, including active listening and acknowledgement of concerns of patient and caregivers  3. Reduce environmental stimuli, as able  4. Instruct patient/family in relaxation techniques, as appropriate  5.  Assess for spiritual pain/suffering and initiate Spiritual Care, Psychosocial Clinical Specialist consults as needed  12/8/2023 0947 by James Vera RN  Outcome: Progressing     Problem: ABCDS Injury Assessment  Goal: Absence of physical injury  12/8/2023 0947 by James Vera RN  Outcome: Progressing

## 2023-12-08 NOTE — PROGRESS NOTES
[address] [facility] Laboratory Technician Worksheet      EEG Date: 2023    Name: Chaitanya Gould   : 1950   Age: 81 y.o. SEX: female    ROOM: [facility] MRN: [id number]           CSN: [id number]      Ordering Provider: DEBORAH Raya  EEG Number: [id number] Time of Test:  0830    Hand: intubated    Sedation: no    H.V. Done: No  intubated   Photic: No    Sleep: Yes  Drowsy: Yes   Sleep Deprived: No    Seizures observed: no    Mentality: stuperous      Clinical History:pt has Hx of CAD stent in  and Covid 19. Pt had anoxic brain injury and acute respiratory failure. Last EEG 2023 Showed no seizures. CT of the head 10/18/2023  IMPRESSION:   No evidence of an acute process. **This report has been created using voice recognition software. It may contain minor errors which are inherent in voice recognition technology. **     Final report electronically signed by Dr. Dana Anaya on 10/18/2023 1:58 PM    MRI of the brain 10/19/2023     IMPRESSION:     1. No evidence of an acute infarct. No evidence of anoxia. 2. Stable mild severity chronic small vessel ischemic changes. 3. Global volume loss.   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

## 2023-12-09 LAB
GLUCOSE BLD STRIP.AUTO-MCNC: 102 MG/DL (ref 70–108)
GLUCOSE BLD STRIP.AUTO-MCNC: 104 MG/DL (ref 70–108)
GLUCOSE BLD STRIP.AUTO-MCNC: 125 MG/DL (ref 70–108)
GLUCOSE BLD STRIP.AUTO-MCNC: 141 MG/DL (ref 70–108)
GLUCOSE BLD STRIP.AUTO-MCNC: 96 MG/DL (ref 70–108)

## 2023-12-09 PROCEDURE — 94761 N-INVAS EAR/PLS OXIMETRY MLT: CPT

## 2023-12-09 PROCEDURE — 82948 REAGENT STRIP/BLOOD GLUCOSE: CPT

## 2023-12-09 PROCEDURE — 99291 CRITICAL CARE FIRST HOUR: CPT | Performed by: INTERNAL MEDICINE

## 2023-12-09 PROCEDURE — 94003 VENT MGMT INPAT SUBQ DAY: CPT

## 2023-12-09 PROCEDURE — 2500000003 HC RX 250 WO HCPCS: Performed by: NURSE PRACTITIONER

## 2023-12-09 PROCEDURE — 95819 EEG AWAKE AND ASLEEP: CPT | Performed by: PSYCHIATRY & NEUROLOGY

## 2023-12-09 PROCEDURE — 6370000000 HC RX 637 (ALT 250 FOR IP): Performed by: INTERNAL MEDICINE

## 2023-12-09 PROCEDURE — 6370000000 HC RX 637 (ALT 250 FOR IP): Performed by: NURSE PRACTITIONER

## 2023-12-09 PROCEDURE — 6360000002 HC RX W HCPCS: Performed by: INTERNAL MEDICINE

## 2023-12-09 PROCEDURE — 2000000000 HC ICU R&B

## 2023-12-09 PROCEDURE — 2580000003 HC RX 258: Performed by: NURSE PRACTITIONER

## 2023-12-09 PROCEDURE — 2700000000 HC OXYGEN THERAPY PER DAY

## 2023-12-09 RX ADMIN — SODIUM CHLORIDE, PRESERVATIVE FREE 10 ML: 5 INJECTION INTRAVENOUS at 21:22

## 2023-12-09 RX ADMIN — ENOXAPARIN SODIUM 80 MG: 100 INJECTION SUBCUTANEOUS at 21:19

## 2023-12-09 RX ADMIN — ENOXAPARIN SODIUM 80 MG: 100 INJECTION SUBCUTANEOUS at 10:26

## 2023-12-09 RX ADMIN — CHLORHEXIDINE GLUCONATE 0.12% ORAL RINSE 15 ML: 1.2 LIQUID ORAL at 10:25

## 2023-12-09 RX ADMIN — FAMOTIDINE 20 MG: 20 TABLET ORAL at 10:26

## 2023-12-09 RX ADMIN — MICONAZOLE NITRATE: 2 POWDER TOPICAL at 10:25

## 2023-12-09 RX ADMIN — FAMOTIDINE 20 MG: 20 TABLET ORAL at 21:19

## 2023-12-09 RX ADMIN — LEVETIRACETAM 250 MG: 500 TABLET, FILM COATED ORAL at 21:20

## 2023-12-09 RX ADMIN — BUMETANIDE 0.5 MG: 0.5 TABLET ORAL at 10:26

## 2023-12-09 RX ADMIN — MICONAZOLE NITRATE: 2 POWDER TOPICAL at 21:22

## 2023-12-09 RX ADMIN — LEVETIRACETAM 250 MG: 500 TABLET, FILM COATED ORAL at 10:26

## 2023-12-09 RX ADMIN — CHLORHEXIDINE GLUCONATE 0.12% ORAL RINSE 15 ML: 1.2 LIQUID ORAL at 21:19

## 2023-12-09 RX ADMIN — FOLIC ACID 1 MG: 1 TABLET ORAL at 10:26

## 2023-12-09 RX ADMIN — MODAFINIL 200 MG: 100 TABLET ORAL at 10:26

## 2023-12-09 RX ADMIN — INSULIN GLARGINE 30 UNITS: 100 INJECTION, SOLUTION SUBCUTANEOUS at 21:19

## 2023-12-09 RX ADMIN — SODIUM CHLORIDE, PRESERVATIVE FREE 10 ML: 5 INJECTION INTRAVENOUS at 10:26

## 2023-12-09 ASSESSMENT — PULMONARY FUNCTION TESTS
PIF_VALUE: 16
PIF_VALUE: 15
PIF_VALUE: 16
PIF_VALUE: 15

## 2023-12-09 NOTE — PROGRESS NOTES
CRITICAL CARE MEDICINE Progress notes     Patient:  Dillon Hayes    Unit/Bed:4D-08/008-A  MRN: [id number]   PCP: Ramana Szymanski MD  Date of Admission: 10/12/2023    Assessment and Plan(All pulmonary edema, renal failure, PE, and respiratory failure diagnoses are acute in nature unless otherwise specified): Anoxic Brain Injury: Highest GCS obtained is 8. Mostly stays at GCS 5. GCS did reach 7 on 11/27/23 which represents an isolated event. GCS 5 remains the baseline. Patient continues with ventilator support to allow time to determine extent of recovery. Injury is severe persistent vegetative state. Multiple EEGs and continuous EEG show no seizure activity. EEG is consistent with severe encephalopathy. MRI and CT head show no structural injury. Patient did not qualify for hypothermia therapy as she had respiratory arrest leading to cardiac arrest.  She did receive hypertonic saline to minimize risk of cerebral edema keeping serum sodium 145-150. Patient on Provigil for neuro-stimulation. On 11/28/23, Keppra dose to 250 mg bid. Look to discontinue Keppra if no seizures by 12/12/23. EEG completed 11/16/23 and on 12/08/23 shows ongoing cortical dysfunction. Seems to have reached plateau of neurologic recovery. GCS 5 is the baseline. Nurses documenting GCS 7-9. The best level I can achieve is GCS 6 (1 for no motor response, 1 for non verbal, and 4 for spontaneous eye opening:  Mostly eye opening is to touch)  Continue to monitor for neurologic recovery. Acute Respiratory Failure: Intubated with difficulty on 10/16/23 secondary to complete airway collapse with no capacity for air exchange. What air was in the lungs was trapped in the lungs. The amount of laryngeal edema was intense and required a stylette to penetrate the edema and secure the airway. Patient remains on mechanical ventilator for airway protection. Continue with ventilator support.   Family reports patient would not hours.  BMP:No results for input(s): \"NA\", \"K\", \"CL\", \"CO2\", \"BUN\", \"CREATININE\", \"GLUCOSE\", \"MG\", \"PHOS\", \"CALCIUM\", \"IONCA\" in the last 72 hours. Hepatic: No results for input(s): \"AST\", \"ALT\", \"ALB\", \"BILITOT\", \"ALKPHOS\", \"AMYLASE\", \"LIPASE\" in the last 72 hours. Cardiac Enzymes: No results for input(s): \"CKTOTAL\", \"CKMB\", \"TROPONINI\" in the last 72 hours. BNP: No results for input(s): \"BNP\" in the last 72 hours. INR: No results for input(s): \"INR\", \"PROTIME\" in the last 72 hours. POC   Recent Labs     12/08/23  1217 12/09/23  0034 12/09/23  0528   POCGLU 118* 141* 104     No results for input(s): \"LACTA\" in the last 72 hours. MRI scan of the brain without contrast performed on 8 December 2023:  Reading Physician Reading Date Result Priority   Jane Oglesby MD  [phone] 12/8/2023      Narrative & Impression  PROCEDURE: MRI BRAIN WO CONTRAST     CLINICAL INFORMATION persistent vegetative state. COMPARISON: Brain MRI 10/19/2023. IMPRESSION:     1. Interval development of significant global volume loss and severe abnormal signal throughout the white matter the brain and cerebellum consistent with leukoencephalopathy. 2. Opacified left maxillary sinus with mucosal thickening in the ethmoid air cells and sphenoid sinus. There is also fluid in the mastoid air cells. Chest x-ray performed on: 11/24/23:  Chest x-ray portable view performed on 24 November 2023:  IMPRESSION:  1. No significant interval change from previous exam. Support devices in   appropriate position. Improved small left pleural effusion and overlying   atelectasis. X-ray of abdomen to check NG tube position: Performed on 27 November 2023:  IMPRESSION:  NG tube in good position. EEG performed on 8 December 2023:  Date: 12/8/2023  Referring physician: William Lynn MD    Indication  Patient aged 81 y with encephalopathy. EEG done to assess for epileptiform activity.     Introduction  This 4 hrs vEEG was recorded using

## 2023-12-09 NOTE — PLAN OF CARE
Problem: Discharge Planning  Goal: Discharge to home or other facility with appropriate resources  Outcome: Not Progressing  Flowsheets (Taken 12/8/2023 2000)  Discharge to home or other facility with appropriate resources: Identify barriers to discharge with patient and caregiver     Problem: Chronic Conditions and Co-morbidities  Goal: Patient's chronic conditions and co-morbidity symptoms are monitored and maintained or improved  Outcome: Not Progressing  Flowsheets (Taken 12/8/2023 2000)  Care Plan - Patient's Chronic Conditions and Co-Morbidity Symptoms are Monitored and Maintained or Improved:   Monitor and assess patient's chronic conditions and comorbid symptoms for stability, deterioration, or improvement   Collaborate with multidisciplinary team to address chronic and comorbid conditions and prevent exacerbation or deterioration   Update acute care plan with appropriate goals if chronic or comorbid symptoms are exacerbated and prevent overall improvement and discharge     Problem: Safety - Adult  Goal: Free from fall injury  Outcome: Progressing  Flowsheets (Taken 12/9/2023 0728)  Free From Fall Injury: Instruct family/caregiver on patient safety     Problem: Pain  Goal: Verbalizes/displays adequate comfort level or baseline comfort level  Outcome: Not Progressing  Flowsheets (Taken 12/8/2023 2000)  Verbalizes/displays adequate comfort level or baseline comfort level: Assess pain using appropriate pain scale     Problem: Respiratory - Adult  Goal: Achieves optimal ventilation and oxygenation  12/9/2023 0738 by Keri Sutton RN  Outcome: Progressing     Problem: Neurosensory - Adult  Goal: Achieves stable or improved neurological status  Outcome: Not Progressing  Flowsheets (Taken 12/8/2023 2000)  Achieves stable or improved neurological status:   Assess for and report changes in neurological status   Initiate measures to prevent increased intracranial pressure   Maintain blood pressure and

## 2023-12-09 NOTE — PLAN OF CARE
Problem: Respiratory - Adult  Goal: Normal spontaneous ventilation  12/9/2023 1031 by Adrienne GARCIA RCP  Outcome: Progressing     Vent setting optimized to achieve target tidal volume, respiratory rate and ideal oxygen saturations. SBT will be performed when appropriate. Patient Weaning Progress    The patient's vent settings was not able to be weaned this shift. Ventilator settings that were weaned              [] Mode   [] Pressure support weaned   [] Fio2 weaned   [] Peep weaned      Spontaneous weaning trial  was attempted. The patient was able to tolerate SBT. Spontanteous VT was 409 and RR 14 breaths/min. Unable to get agreement for goals because no family is present and patient cannot respond.

## 2023-12-09 NOTE — PLAN OF CARE
Problem: Respiratory - Adult  Goal: Achieves optimal ventilation and oxygenation  12/8/2023 2017 by Suleman Yanez RCP  Outcome: Not Progressing  Flowsheets (Taken 12/8/2023 2017)  Achieves optimal ventilation and oxygenation:   Assess for changes in respiratory status   Position to facilitate oxygenation and minimize respiratory effort   Assess the need for suctioning and aspirate as needed   Respiratory therapy support as indicated   Assess for changes in mentation and behavior   Encourage broncho-pulmonary hygiene including cough, deep breathe, incentive spirometry   Oxygen supplementation based on oxygen saturation or arterial blood gases   Assess and instruct to report shortness of breath or any respiratory difficulty  Note: Intubated/Vented. Wean as tolerated.  SBT when appropriate    Problem: Respiratory - Adult  Goal: Normal spontaneous ventilation  12/8/2023 2017 by Suleman Yanez RCP  Outcome: Progressing

## 2023-12-09 NOTE — PROGRESS NOTES
Patient Weaning Progress    The patient's vent settings was able to be weaned this shift. Ventilator settings that were weaned              [x] Mode   [] Pressure support weaned   [] Fio2 weaned   [] Peep weaned      Spontaneous weaning trial  was attempted. The patient was able to tolerate SBT. RSBI  was 38 with EtCO2 of 44 and SpO2 of 93 on 30% FiO2. Spontanteous VT was 442 and RR 17 breaths/min. Cuff was not  deflated to determine cuff leak    Unable to get agreement for goals because no family is present and patient cannot respond.

## 2023-12-10 VITALS
WEIGHT: 169.75 LBS | TEMPERATURE: 98.1 F | HEIGHT: 62 IN | RESPIRATION RATE: 17 BRPM | DIASTOLIC BLOOD PRESSURE: 56 MMHG | SYSTOLIC BLOOD PRESSURE: 110 MMHG | OXYGEN SATURATION: 98 % | HEART RATE: 78 BPM | BODY MASS INDEX: 31.24 KG/M2

## 2023-12-10 LAB
GLUCOSE BLD STRIP.AUTO-MCNC: 102 MG/DL (ref 70–108)
GLUCOSE BLD STRIP.AUTO-MCNC: 124 MG/DL (ref 70–108)
GLUCOSE BLD STRIP.AUTO-MCNC: 99 MG/DL (ref 70–108)

## 2023-12-10 PROCEDURE — 99233 SBSQ HOSP IP/OBS HIGH 50: CPT | Performed by: INTERNAL MEDICINE

## 2023-12-10 PROCEDURE — 6360000002 HC RX W HCPCS: Performed by: INTERNAL MEDICINE

## 2023-12-10 PROCEDURE — 94761 N-INVAS EAR/PLS OXIMETRY MLT: CPT

## 2023-12-10 PROCEDURE — 2000000000 HC ICU R&B

## 2023-12-10 PROCEDURE — 6370000000 HC RX 637 (ALT 250 FOR IP): Performed by: NURSE PRACTITIONER

## 2023-12-10 PROCEDURE — 6370000000 HC RX 637 (ALT 250 FOR IP): Performed by: INTERNAL MEDICINE

## 2023-12-10 PROCEDURE — 94003 VENT MGMT INPAT SUBQ DAY: CPT

## 2023-12-10 PROCEDURE — 2580000003 HC RX 258: Performed by: NURSE PRACTITIONER

## 2023-12-10 PROCEDURE — 82948 REAGENT STRIP/BLOOD GLUCOSE: CPT

## 2023-12-10 PROCEDURE — 2700000000 HC OXYGEN THERAPY PER DAY

## 2023-12-10 RX ADMIN — INSULIN GLARGINE 30 UNITS: 100 INJECTION, SOLUTION SUBCUTANEOUS at 20:26

## 2023-12-10 RX ADMIN — CHLORHEXIDINE GLUCONATE 0.12% ORAL RINSE 15 ML: 1.2 LIQUID ORAL at 07:18

## 2023-12-10 RX ADMIN — FAMOTIDINE 20 MG: 20 TABLET ORAL at 07:18

## 2023-12-10 RX ADMIN — SODIUM CHLORIDE, PRESERVATIVE FREE 10 ML: 5 INJECTION INTRAVENOUS at 20:26

## 2023-12-10 RX ADMIN — MICONAZOLE NITRATE: 2 POWDER TOPICAL at 07:20

## 2023-12-10 RX ADMIN — LEVETIRACETAM 250 MG: 500 TABLET, FILM COATED ORAL at 07:19

## 2023-12-10 RX ADMIN — ENOXAPARIN SODIUM 80 MG: 100 INJECTION SUBCUTANEOUS at 07:18

## 2023-12-10 RX ADMIN — MODAFINIL 200 MG: 100 TABLET ORAL at 07:18

## 2023-12-10 RX ADMIN — LEVETIRACETAM 250 MG: 500 TABLET, FILM COATED ORAL at 20:26

## 2023-12-10 RX ADMIN — FOLIC ACID 1 MG: 1 TABLET ORAL at 07:19

## 2023-12-10 RX ADMIN — CALCIUM POLYCARBOPHIL 625 MG: 625 TABLET, FILM COATED ORAL at 07:18

## 2023-12-10 RX ADMIN — FAMOTIDINE 20 MG: 20 TABLET ORAL at 20:27

## 2023-12-10 RX ADMIN — MICONAZOLE NITRATE: 2 POWDER TOPICAL at 20:27

## 2023-12-10 RX ADMIN — CHLORHEXIDINE GLUCONATE 0.12% ORAL RINSE 15 ML: 1.2 LIQUID ORAL at 20:26

## 2023-12-10 RX ADMIN — SODIUM CHLORIDE, PRESERVATIVE FREE 10 ML: 5 INJECTION INTRAVENOUS at 07:19

## 2023-12-10 RX ADMIN — BUMETANIDE 0.5 MG: 0.5 TABLET ORAL at 07:19

## 2023-12-10 RX ADMIN — ENOXAPARIN SODIUM 80 MG: 100 INJECTION SUBCUTANEOUS at 20:26

## 2023-12-10 ASSESSMENT — PULMONARY FUNCTION TESTS
PIF_VALUE: 17
PIF_VALUE: 15

## 2023-12-10 NOTE — PROGRESS NOTES
Patient Weaning Progress    The patient's vent settings was able to be weaned this shift. Ventilator settings that were weaned              [x] Mode   [] Pressure support weaned   [] Fio2 weaned   [] Peep weaned      Spontaneous weaning trial  was attempted. The patient was able to tolerate SBT. RSBI  was 41 with EtCO2 of 40 and SpO2 of 96 on 25 FiO2. Spontanteous VT was 384 and RR 16 breaths/min. Cuff was not  deflated to determine cuff leak    Unable to get agreement for goals because no family is present and patient cannot respond.

## 2023-12-10 NOTE — PLAN OF CARE
Problem: Respiratory - Adult  Goal: Achieves optimal ventilation and oxygenation  Outcome: Not Progressing  Achieves optimal ventilation and oxygenation:   Assess for changes in respiratory status   Position to facilitate oxygenation and minimize respiratory effort   Assess the need for suctioning and aspirate as needed   Respiratory therapy support as indicated   Assess for changes in mentation and behavior   Encourage broncho-pulmonary hygiene including cough, deep breathe, incentive spirometry   Oxygen supplementation based on oxygen saturation or arterial blood gases   Assess and instruct to report shortness of breath or any respiratory difficulty  Note: Intubated/Vented. Wean as tolerated.  SBT when appropriate    Problem: Respiratory - Adult  Goal: Normal spontaneous ventilation  Outcome: Progressing

## 2023-12-10 NOTE — PROGRESS NOTES
CRITICAL CARE MEDICINE Progress notes     Patient:  Jeffry Earl    Unit/Bed:4D-08/008-A  MRN: [id number]   PCP: Diamond Tapia MD  Date of Admission: 10/12/2023    Assessment and Plan(All pulmonary edema, renal failure, PE, and respiratory failure diagnoses are acute in nature unless otherwise specified): Anoxic Brain Injury: Highest GCS obtained is 8. Mostly stays at GCS 5. GCS did reach 7 on 11/27/23 which represents an isolated event. GCS 5 remains the baseline. Patient continues with ventilator support to allow time to determine extent of recovery. Injury is severe persistent vegetative state. Multiple EEGs and continuous EEG show no seizure activity. EEG is consistent with severe encephalopathy. MRI and CT head show no structural injury. Patient did not qualify for hypothermia therapy as she had respiratory arrest leading to cardiac arrest.  She did receive hypertonic saline to minimize risk of cerebral edema keeping serum sodium 145-150. Patient on Provigil for neuro-stimulation. On 11/28/23, Keppra dose to 250 mg bid. Dr. Kyung Alvarado MD is planning to discontinue Keppra if no seizures by 12/12/23. EEG completed 11/16/23 and on 12/08/23 shows ongoing cortical dysfunction. Seems to have reached plateau of neurologic recovery. GCS 5 is the baseline. Nurses documenting GCS 7-9. The best level I can achieve is GCS 6 (1 for no motor response, 1 for non verbal, and 4 for spontaneous eye opening:  Mostly eye opening is to touch)  Continue to monitor for neurologic recovery. Acute Respiratory Failure: Intubated with difficulty on 10/16/23 secondary to complete airway collapse with no capacity for air exchange. What air was in the lungs was trapped in the lungs. The amount of laryngeal edema was intense and required a stylette to penetrate the edema and secure the airway. Patient remains on mechanical ventilator for airway protection. Continue with ventilator support.   Family reports patient would not want tracheostomy tube again-ever. No plans for extubation at this time. Li MD had a discussion with patient's  on 11/17/23 indicates they would consider tracheostomy tube to extend time to recover. Dr. Rosalina Duran MD met with the family on 12/1/23 and discussed her condition. They want a copy of the medical records to have an expert to review. Once this process is complete, they will decide further steps. Post Cricotracheal resection with laryngoplasty: 10/12/23. Dr. Baron Ranks following. Debridement completed 11/16/23. Bilateral DVT: On heparin. Right DVT has resolved on venous doppler completed 11/1/23. Left DVTs persist.  On therapeutic Lovenox. PICC line removed 12/5/23. DM II: On Lantus insulin with SSI. Diastolic Heart Failure: Echo 1/2/23 shows EF 60%. On diuretics. HTN: Currently on Bumex. Anemia: H/H stable. Edema:  Diurese. Nutrition: On TF. GI prophylaxis: Pepcid. Hypotension:  Resolved. .  Tongue biting:  Primarily left sided. Repeat 4 hour EEG on 11/16/23 showed no seizures. Resolved. CC:  Anoxic Brain Injury. HPI: Patient is a 81year old white female nonsmoker. She has a history of CAD with stenting in 2008. She has degenerative arthritis, diabetic neuropathy with bilateral feet numbness at baseline. She has type II DM, previous PE in January 2023, hyperlipidemia, and HTN. Patient has history of COVID-19 with associated respiratory failure requiring intubation and subsequent tracheosotmy tube placement. She developed tracheal stenosis. Repeated laryngoscopy with dilation did not sustain improvement. She was admitted to [facility] on 10/12/23 and underwnet cricotracheal resection with laryngoplasty. She failed post-operative extubation due to airway inflammation. She was maintained on mechanical ventilator until extubation trial 10/16.   At extubation, she fully collapsed her airway trapping air in the chest.  She

## 2023-12-10 NOTE — PLAN OF CARE
Problem: Respiratory - Adult  Goal: Achieves optimal ventilation and oxygenation  Outcome: Not Progressing  Flowsheets (Taken 12/10/2023 5130 by Santy Escalera RN)  Achieves optimal ventilation and oxygenation: Assess for changes in respiratory status   Vent setting optimized to achieve target tidal volume, respiratory rate and ideal oxygen saturations. SBT will be performed when appropriate. Patient Weaning Progress    The patient's vent settings  remain unchanged this shift. Evac tube was  hooked up with continuous low suction(20-30mmHg)      Cuff was not  deflated to determine cuff leak. Unable to get agreement for goals because no family is present and patient cannot respond.

## 2023-12-10 NOTE — PLAN OF CARE
Problem: Discharge Planning  Goal: Discharge to home or other facility with appropriate resources  12/9/2023 2110 by Priscila Lynn RN  Outcome: Not Progressing  Flowsheets (Taken 12/9/2023 2000)  Discharge to home or other facility with appropriate resources: Identify barriers to discharge with patient and caregiver     Problem: Chronic Conditions and Co-morbidities  Goal: Patient's chronic conditions and co-morbidity symptoms are monitored and maintained or improved  12/9/2023 2110 by Priscila Lynn RN  Outcome: Progressing  Flowsheets (Taken 12/9/2023 2000)  Care Plan - Patient's Chronic Conditions and Co-Morbidity Symptoms are Monitored and Maintained or Improved:   Monitor and assess patient's chronic conditions and comorbid symptoms for stability, deterioration, or improvement   Collaborate with multidisciplinary team to address chronic and comorbid conditions and prevent exacerbation or deterioration   Update acute care plan with appropriate goals if chronic or comorbid symptoms are exacerbated and prevent overall improvement and discharge     Problem: Safety - Adult  Goal: Free from fall injury  12/9/2023 2110 by Priscila Lynn RN  Outcome: Progressing  Flowsheets (Taken 12/9/2023 2106)  Free From Fall Injury: Instruct family/caregiver on patient safety     Problem: Pain  Goal: Verbalizes/displays adequate comfort level or baseline comfort level  12/9/2023 2110 by Priscila Lynn RN  Outcome: Not Progressing  Flowsheets (Taken 12/9/2023 2000)  Verbalizes/displays adequate comfort level or baseline comfort level: Assess pain using appropriate pain scale     Problem: Respiratory - Adult  Goal: Achieves optimal ventilation and oxygenation  12/9/2023 2110 by Priscila Lynn RN  Outcome: Not Progressing  Flowsheets (Taken 12/9/2023 2000)  Achieves optimal ventilation and oxygenation:   Assess for changes in respiratory status   Assess for changes in mentation and

## 2023-12-11 ENCOUNTER — APPOINTMENT (OUTPATIENT)
Dept: GENERAL RADIOLOGY | Age: 73
DRG: 163 | End: 2023-12-11
Attending: OTOLARYNGOLOGY
Payer: MEDICARE

## 2023-12-11 LAB
ANION GAP SERPL CALC-SCNC: 9 MEQ/L (ref 8–16)
BASOPHILS ABSOLUTE: 0 THOU/MM3 (ref 0–0.1)
BASOPHILS NFR BLD AUTO: 0.2 %
BUN SERPL-MCNC: 25 MG/DL (ref 7–22)
CALCIUM SERPL-MCNC: 9.4 MG/DL (ref 8.5–10.5)
CHLORIDE SERPL-SCNC: 99 MEQ/L (ref 98–111)
CO2 SERPL-SCNC: 30 MEQ/L (ref 23–33)
CREAT SERPL-MCNC: 0.6 MG/DL (ref 0.4–1.2)
DEPRECATED RDW RBC AUTO: 52.2 FL (ref 35–45)
EOSINOPHIL NFR BLD AUTO: 1.3 %
EOSINOPHILS ABSOLUTE: 0.1 THOU/MM3 (ref 0–0.4)
ERYTHROCYTE [DISTWIDTH] IN BLOOD BY AUTOMATED COUNT: 15.3 % (ref 11.5–14.5)
GFR SERPL CREATININE-BSD FRML MDRD: > 60 ML/MIN/1.73M2
GLUCOSE BLD STRIP.AUTO-MCNC: 136 MG/DL (ref 70–108)
GLUCOSE BLD STRIP.AUTO-MCNC: 137 MG/DL (ref 70–108)
GLUCOSE BLD STRIP.AUTO-MCNC: 154 MG/DL (ref 70–108)
GLUCOSE BLD STRIP.AUTO-MCNC: 95 MG/DL (ref 70–108)
GLUCOSE SERPL-MCNC: 121 MG/DL (ref 70–108)
HCT VFR BLD AUTO: 30.1 % (ref 37–47)
HGB BLD-MCNC: 9.5 GM/DL (ref 12–16)
IMM GRANULOCYTES # BLD AUTO: 0.11 THOU/MM3 (ref 0–0.07)
IMM GRANULOCYTES NFR BLD AUTO: 1.3 %
LYMPHOCYTES ABSOLUTE: 1.4 THOU/MM3 (ref 1–4.8)
LYMPHOCYTES NFR BLD AUTO: 16.9 %
MCH RBC QN AUTO: 29.6 PG (ref 26–33)
MCHC RBC AUTO-ENTMCNC: 31.6 GM/DL (ref 32.2–35.5)
MCV RBC AUTO: 93.8 FL (ref 81–99)
MONOCYTES ABSOLUTE: 0.5 THOU/MM3 (ref 0.4–1.3)
MONOCYTES NFR BLD AUTO: 6.4 %
NEUTROPHILS NFR BLD AUTO: 73.9 %
NRBC BLD AUTO-RTO: 0 /100 WBC
PLATELET # BLD AUTO: 246 THOU/MM3 (ref 130–400)
PMV BLD AUTO: 10.2 FL (ref 9.4–12.4)
POTASSIUM SERPL-SCNC: 4.2 MEQ/L (ref 3.5–5.2)
RBC # BLD AUTO: 3.21 MILL/MM3 (ref 4.2–5.4)
SEGMENTED NEUTROPHILS ABSOLUTE COUNT: 6.1 THOU/MM3 (ref 1.8–7.7)
SODIUM SERPL-SCNC: 138 MEQ/L (ref 135–145)
WBC # BLD AUTO: 8.3 THOU/MM3 (ref 4.8–10.8)

## 2023-12-11 PROCEDURE — 82948 REAGENT STRIP/BLOOD GLUCOSE: CPT

## 2023-12-11 PROCEDURE — 6360000002 HC RX W HCPCS: Performed by: INTERNAL MEDICINE

## 2023-12-11 PROCEDURE — 2000000000 HC ICU R&B

## 2023-12-11 PROCEDURE — 94761 N-INVAS EAR/PLS OXIMETRY MLT: CPT

## 2023-12-11 PROCEDURE — 6370000000 HC RX 637 (ALT 250 FOR IP): Performed by: NURSE PRACTITIONER

## 2023-12-11 PROCEDURE — 6370000000 HC RX 637 (ALT 250 FOR IP): Performed by: INTERNAL MEDICINE

## 2023-12-11 PROCEDURE — 2700000000 HC OXYGEN THERAPY PER DAY

## 2023-12-11 PROCEDURE — 85025 COMPLETE CBC W/AUTO DIFF WBC: CPT

## 2023-12-11 PROCEDURE — 2580000003 HC RX 258: Performed by: NURSE PRACTITIONER

## 2023-12-11 PROCEDURE — 36415 COLL VENOUS BLD VENIPUNCTURE: CPT

## 2023-12-11 PROCEDURE — 94003 VENT MGMT INPAT SUBQ DAY: CPT

## 2023-12-11 PROCEDURE — 80048 BASIC METABOLIC PNL TOTAL CA: CPT

## 2023-12-11 PROCEDURE — 99291 CRITICAL CARE FIRST HOUR: CPT | Performed by: INTERNAL MEDICINE

## 2023-12-11 PROCEDURE — 99024 POSTOP FOLLOW-UP VISIT: CPT | Performed by: REGISTERED NURSE

## 2023-12-11 PROCEDURE — 74018 RADEX ABDOMEN 1 VIEW: CPT

## 2023-12-11 RX ORDER — DEXTROSE MONOHYDRATE 50 MG/ML
INJECTION, SOLUTION INTRAVENOUS CONTINUOUS
Status: DISCONTINUED | OUTPATIENT
Start: 2023-12-11 | End: 2023-12-11 | Stop reason: CLARIF

## 2023-12-11 RX ADMIN — SODIUM CHLORIDE, PRESERVATIVE FREE 10 ML: 5 INJECTION INTRAVENOUS at 10:58

## 2023-12-11 RX ADMIN — LEVETIRACETAM 250 MG: 500 TABLET, FILM COATED ORAL at 09:13

## 2023-12-11 RX ADMIN — CHLORHEXIDINE GLUCONATE 0.12% ORAL RINSE 15 ML: 1.2 LIQUID ORAL at 20:27

## 2023-12-11 RX ADMIN — FAMOTIDINE 20 MG: 20 TABLET ORAL at 20:29

## 2023-12-11 RX ADMIN — MICONAZOLE NITRATE: 2 POWDER TOPICAL at 20:27

## 2023-12-11 RX ADMIN — ENOXAPARIN SODIUM 80 MG: 100 INJECTION SUBCUTANEOUS at 22:13

## 2023-12-11 RX ADMIN — MODAFINIL 200 MG: 100 TABLET ORAL at 09:11

## 2023-12-11 RX ADMIN — FAMOTIDINE 20 MG: 20 TABLET ORAL at 09:12

## 2023-12-11 RX ADMIN — FOLIC ACID 1 MG: 1 TABLET ORAL at 09:13

## 2023-12-11 RX ADMIN — MICONAZOLE NITRATE: 2 POWDER TOPICAL at 09:14

## 2023-12-11 RX ADMIN — ENOXAPARIN SODIUM 80 MG: 100 INJECTION SUBCUTANEOUS at 09:11

## 2023-12-11 RX ADMIN — BUMETANIDE 0.5 MG: 0.5 TABLET ORAL at 09:13

## 2023-12-11 RX ADMIN — INSULIN GLARGINE 30 UNITS: 100 INJECTION, SOLUTION SUBCUTANEOUS at 20:31

## 2023-12-11 RX ADMIN — SODIUM CHLORIDE, PRESERVATIVE FREE 10 ML: 5 INJECTION INTRAVENOUS at 19:45

## 2023-12-11 RX ADMIN — CHLORHEXIDINE GLUCONATE 0.12% ORAL RINSE 15 ML: 1.2 LIQUID ORAL at 09:11

## 2023-12-11 ASSESSMENT — PULMONARY FUNCTION TESTS
PIF_VALUE: 15

## 2023-12-11 NOTE — SIGNIFICANT EVENT
I met with the  and the two daughters. They do not feel she is going to improve. I expressed my agreement with that sentiment. They plan to withdraw care on Sunday. They asked if I could remove the ETT. I will come in and provide that service for them on Sunday barring any unforseen circumstance. Electronically signed by Sabina Pierce MD.

## 2023-12-11 NOTE — PROGRESS NOTES
Patient:  Edmund Cottrell    Unit/Bed:4D-08/008-A  MRN: [id number]   PCP: Luciana Romero MD  Date of Admission: 10/12/2023    Assessment and Plan(All pulmonary edema, renal failure, PE, and respiratory failure diagnoses are acute in nature unless otherwise specified): Anoxic Brain Injury: Highest GCS obtained is 8. Mostly stays at GCS 5. GCS did reach 7 on 11/27/23 which represents an isolated event. GCS 5 remains the baseline. Patient continues with ventilator support to allow time to determine extent of recovery. Injury is severe persistent vegetative state. Multiple EEGs and continuous EEG show no seizure activity. EEG is consistent with severe encephalopathy. Initial MRIs and CTs head show no structural injury. Patient did not qualify for hypothermia therapy as she had respiratory arrest leading to cardiac arrest.  There is no data to show recovery benefit in this population. She did receive hypertonic saline to minimize risk of cerebral edema keeping serum sodium 145-150. Patient on Provigil for neuro-stimulation. On 11/28/23, Keppra dose to 250 mg bid. Look to discontinue Keppra if no seizures by 12/12/23. EEG completed 12/8/23 shows ongoing cortical dysfunction and severe nonspecific encephalopathy. 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 Seems to have reached plateau of neurologic recovery. GCS 5 is the baseline. Nurses documenting GCS 7-9. I have not seen this level. The best level I can achieve is GCS 6 (1 for no motor response, 1 for non verbal, and 4 for spontaneous eye opening:  Mostly eye opening is to touch)  Continue to monitor for neurologic recovery. Acute Respiratory Failure: Intubated with difficulty on 10/16/23 secondary to complete airway collapse with no capacity for air exchange. What air was in the lungs was trapped in the lungs.   The amount of

## 2023-12-11 NOTE — PROGRESS NOTES
Patient Weaning Progress    The patient's vent settings was able to be weaned this shift. Ventilator settings that were weaned              [x] Mode   [] Pressure support weaned   [] Fio2 weaned   [] Peep weaned      Spontaneous weaning trial  was attempted. The patient was able to tolerate SBT. RSBI  was 52 with EtCO2 of 42 and SpO2 of 96 on 25 FiO2. Spontanteous VT was 340 and RR 18 breaths/min. Cuff was not  deflated to determine cuff leak    Unable to get agreement for goals because no family is present and patient cannot respond.

## 2023-12-11 NOTE — PLAN OF CARE
Problem: Discharge Planning  Goal: Discharge to home or other facility with appropriate resources  Outcome: Not Progressing  Flowsheets (Taken 12/10/2023 2000)  Discharge to home or other facility with appropriate resources: Identify barriers to discharge with patient and caregiver     Problem: Chronic Conditions and Co-morbidities  Goal: Patient's chronic conditions and co-morbidity symptoms are monitored and maintained or improved  Outcome: Progressing  Flowsheets (Taken 12/10/2023 2000)  Care Plan - Patient's Chronic Conditions and Co-Morbidity Symptoms are Monitored and Maintained or Improved:   Collaborate with multidisciplinary team to address chronic and comorbid conditions and prevent exacerbation or deterioration   Monitor and assess patient's chronic conditions and comorbid symptoms for stability, deterioration, or improvement   Update acute care plan with appropriate goals if chronic or comorbid symptoms are exacerbated and prevent overall improvement and discharge     Problem: Safety - Adult  Goal: Free from fall injury  Outcome: Progressing  Flowsheets (Taken 12/10/2023 2039)  Free From Fall Injury: Instruct family/caregiver on patient safety     Problem: Pain  Goal: Verbalizes/displays adequate comfort level or baseline comfort level  Outcome: Not Progressing  Flowsheets (Taken 12/10/2023 2000)  Verbalizes/displays adequate comfort level or baseline comfort level: Assess pain using appropriate pain scale     Problem: Respiratory - Adult  Goal: Achieves optimal ventilation and oxygenation  12/11/2023 0456 by Zona Canavan, RN  Outcome: Progressing  Flowsheets (Taken 12/10/2023 2000)  Achieves optimal ventilation and oxygenation:   Assess for changes in respiratory status   Assess for changes in mentation and behavior   Position to facilitate oxygenation and minimize respiratory effort   Oxygen supplementation based on oxygen saturation or arterial blood gases     Problem: Neurosensory -

## 2023-12-12 LAB
GLUCOSE BLD STRIP.AUTO-MCNC: 123 MG/DL (ref 70–108)
GLUCOSE BLD STRIP.AUTO-MCNC: 135 MG/DL (ref 70–108)

## 2023-12-12 PROCEDURE — 6370000000 HC RX 637 (ALT 250 FOR IP): Performed by: NURSE PRACTITIONER

## 2023-12-12 PROCEDURE — 82948 REAGENT STRIP/BLOOD GLUCOSE: CPT

## 2023-12-12 PROCEDURE — 6360000002 HC RX W HCPCS: Performed by: INTERNAL MEDICINE

## 2023-12-12 PROCEDURE — 94003 VENT MGMT INPAT SUBQ DAY: CPT

## 2023-12-12 PROCEDURE — 99024 POSTOP FOLLOW-UP VISIT: CPT | Performed by: REGISTERED NURSE

## 2023-12-12 PROCEDURE — 2580000003 HC RX 258: Performed by: NURSE PRACTITIONER

## 2023-12-12 PROCEDURE — 2000000000 HC ICU R&B

## 2023-12-12 PROCEDURE — 94761 N-INVAS EAR/PLS OXIMETRY MLT: CPT

## 2023-12-12 PROCEDURE — 6370000000 HC RX 637 (ALT 250 FOR IP): Performed by: INTERNAL MEDICINE

## 2023-12-12 PROCEDURE — 99233 SBSQ HOSP IP/OBS HIGH 50: CPT | Performed by: INTERNAL MEDICINE

## 2023-12-12 PROCEDURE — 2700000000 HC OXYGEN THERAPY PER DAY

## 2023-12-12 RX ADMIN — FAMOTIDINE 20 MG: 20 TABLET ORAL at 08:25

## 2023-12-12 RX ADMIN — CHLORHEXIDINE GLUCONATE 0.12% ORAL RINSE 15 ML: 1.2 LIQUID ORAL at 21:34

## 2023-12-12 RX ADMIN — MICONAZOLE NITRATE: 2 POWDER TOPICAL at 21:35

## 2023-12-12 RX ADMIN — ENOXAPARIN SODIUM 80 MG: 100 INJECTION SUBCUTANEOUS at 08:30

## 2023-12-12 RX ADMIN — SODIUM CHLORIDE, PRESERVATIVE FREE 10 ML: 5 INJECTION INTRAVENOUS at 21:34

## 2023-12-12 RX ADMIN — SODIUM CHLORIDE, PRESERVATIVE FREE 10 ML: 5 INJECTION INTRAVENOUS at 08:33

## 2023-12-12 RX ADMIN — CALCIUM POLYCARBOPHIL 625 MG: 625 TABLET, FILM COATED ORAL at 08:26

## 2023-12-12 RX ADMIN — FOLIC ACID 1 MG: 1 TABLET ORAL at 08:26

## 2023-12-12 RX ADMIN — MODAFINIL 200 MG: 100 TABLET ORAL at 08:25

## 2023-12-12 RX ADMIN — MICONAZOLE NITRATE: 2 POWDER TOPICAL at 08:33

## 2023-12-12 RX ADMIN — FAMOTIDINE 20 MG: 20 TABLET ORAL at 21:34

## 2023-12-12 RX ADMIN — CHLORHEXIDINE GLUCONATE 0.12% ORAL RINSE 15 ML: 1.2 LIQUID ORAL at 08:25

## 2023-12-12 RX ADMIN — INSULIN GLARGINE 30 UNITS: 100 INJECTION, SOLUTION SUBCUTANEOUS at 21:34

## 2023-12-12 RX ADMIN — ENOXAPARIN SODIUM 80 MG: 100 INJECTION SUBCUTANEOUS at 21:34

## 2023-12-12 RX ADMIN — BUMETANIDE 0.5 MG: 0.5 TABLET ORAL at 08:26

## 2023-12-12 ASSESSMENT — PULMONARY FUNCTION TESTS
PIF_VALUE: 15
PIF_VALUE: 14
PIF_VALUE: 15
PIF_VALUE: 12

## 2023-12-12 NOTE — PROGRESS NOTES
Pt was unresponsive but was blessed.     12/12/23 1437   Encounter Summary   Service Provided For: Patient   Referral/Consult From: [facility]   Last Encounter  12/12/23  (NR)   Complexity of Encounter Low   Begin Time 1124   End Time  1130   Total Time Calculated 6 min   Spiritual/Emotional needs   Type Spiritual Support   Assessment/Intervention/Outcome   Assessment Unable to assess

## 2023-12-12 NOTE — PROGRESS NOTES
SubCUTAneous, Q6H  potassium chloride (KLOR-CON M) extended release tablet 40 mEq, 40 mEq, Oral, PRN **OR** potassium bicarb-citric acid (EFFER-K) effervescent tablet 40 mEq, 40 mEq, Oral, PRN **OR** potassium chloride 10 mEq/100 mL IVPB (Peripheral Line), 10 mEq, IntraVENous, PRN  potassium chloride 20 mEq/50 mL IVPB (Central Line), 20 mEq, IntraVENous, PRN **OR** potassium chloride 10 mEq/100 mL IVPB (Peripheral Line), 10 mEq, IntraVENous, PRN  famotidine (PEPCID) tablet 20 mg, 20 mg, Oral, BID  modafinil (PROVIGIL) tablet 200 mg, 200 mg, Oral, Daily  sodium chloride flush 0.9 % injection 5-40 mL, 5-40 mL, IntraVENous, 2 times per day  sodium chloride flush 0.9 % injection 5-40 mL, 5-40 mL, IntraVENous, PRN  polyethylene glycol (GLYCOLAX) packet 17 g, 17 g, Per NG tube, Daily PRN  acetaminophen (TYLENOL) 160 MG/5ML solution 650 mg, 650 mg, Per NG tube, Q4H PRN  chlorhexidine (PERIDEX) 0.12 % solution 15 mL, 15 mL, Mouth/Throat, BID  dextrose bolus 10% 125 mL, 125 mL, IntraVENous, PRN **OR** dextrose bolus 10% 250 mL, 250 mL, IntraVENous, PRN  Glucagon Emergency KIT 1 mg, 1 mg, SubCUTAneous, PRN  dextrose 10 % infusion, , IntraVENous, Continuous PRN    ASSESSMENT AND PLAN    POD  63 s/p circotracheal resection, laryngoplasty with flap and vocal cord lateralization with stitch placement  - S/p laryngoscopy and bronchoscopy with debridement 11/16/23; anastomosis fully healed without breakdown, glottic aperture generous, left true vocal fold well lateralized, redundant mucosa of piriform sinuses and post cricoid space  -Semiflexed positioning with two pillows can be stopped, but avoid sudden/significant extension of the neck  - Continue nutrition via tube feeds per dietician recommendations  - On SBT since 11/16     Failed extubation with cardiopulmonary arrest; Severe encephalopathy; Anoxic brain injury  - Previous EEG with persistent severe encephalopathy and cortical dysfunction.  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 Dr. Caleb Nicole indicates this shows severe structural injury supporting clinical findings that recovery is not expected.   -Dr. Caleb Nicole had lengthy discussion with patient's spouse and two daughters yesterday and they have agreed to withdraw care this Sunday with Dr. Caleb Nicole removing ETT on Sunday 12/17/23. Patient transitioned to [facility] code status at this time. ENT will sign off at this time due to decisions of care as noted above. Please contact with any questions/concerns in the interim.       Electronically signed by WYATT Wooten CNP on 12/12/2023 at 12:44 PM

## 2023-12-12 NOTE — PROGRESS NOTES
Patient:  Nakia Fraser    Unit/Bed:4D-08/008-A  MRN: [id number]   PCP: Pineda Thorne MD  Date of Admission: 10/12/2023    Assessment and Plan(All pulmonary edema, renal failure, PE, and respiratory failure diagnoses are acute in nature unless otherwise specified): Anoxic Brain Injury: Highest GCS obtained is 8. Mostly stays at GCS 5. GCS did reach 7 on 11/27/23 which represents an isolated event. GCS 5 remains the baseline. Patient continues with ventilator support to allow time to determine extent of recovery. Injury is severe persistent vegetative state. Multiple EEGs and continuous EEG show no seizure activity. EEG is consistent with severe encephalopathy. Initial MRIs and CTs head show no structural injury. Patient did not qualify for hypothermia therapy as she had respiratory arrest leading to cardiac arrest.  There is no data to show recovery benefit in this population. She did receive hypertonic saline to minimize risk of cerebral edema keeping serum sodium 145-150. EEG completed 12/8/23 shows ongoing cortical dysfunction and severe nonspecific encephalopathy. 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 Seems to have reached plateau of neurologic recovery. GCS 5 is the baseline. Family plans to withdraw on 12/17/23. Acute Respiratory Failure: Intubated with difficulty on 10/16/23 secondary to complete airway collapse with no capacity for air exchange. What air was in the lungs was trapped in the lungs. The amount of laryngeal edema was intense and required a stylette to penetrate the edema and secure the airway. Patient remains on mechanical ventilator for airway protection. Post Cricotracheal resection with laryngoplasty: 10/12/23. Dr. Rey Colbert following. Debridement completed 11/16/23. Bilateral DVT: On heparin.   Right DVT has resolved on venous doppler reflexes intact. Rooting reflex present. Data: (All radiographs, tracings, PFTs, and imaging are personally viewed and interpreted unless otherwise noted). Venous Doppler report 11/1/23: Left axillary vein acute thrombus. Chronic DVT left subclavian vein. Telemetry shows sinus rhythm. EEG report 12/8/23 shows no seizures but does show severe nonspecific encephalopathy. MRI 12/8/23 shows diffuse signal loss consistent with severe structural injury. Glucose 123  CC time 25 minutes. Time was discontiguous and does not include procedures. Electronically signed by Sandra Hurst M.D.

## 2023-12-12 NOTE — PROGRESS NOTES
Comprehensive Nutrition Assessment    Type and Reason for Visit:  Reassess (Tube Feed Monitor)    Nutrition Recommendations/Plan:   Continue Glucerna 1.5 at goal of 40ml/hour  Continue 250ml free H20 every 6h as ordered 12/5 per Physician     Malnutrition Assessment:  Malnutrition Status: At risk for malnutrition (Comment) (11/20/23 1529)    Context:  Chronic Illness     Findings of the 6 clinical characteristics of malnutrition:  Energy Intake:  No significant decrease in energy intake (pt. tolerating TF since admit)  Weight Loss:  Greater than 10% over 6 months (31# or 16% in 5 months)     Body Fat Loss:  No significant body fat loss     Muscle Mass Loss:  No significant muscle mass loss Temples (temporalis), Clavicles (pectoralis & deltoids)  Fluid Accumulation:  Mild     Strength:  Not Performed    Nutrition Assessment:     Pt. stable from a nutritional standpoint AEB tolerating TF at goal while NPO as intubated. 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          Nutrition Related Findings:    Pt. Report/Treatments/Miscellaneous: Patient seen, intubated, tolerating tube feed at goal, note code status change to comfort care   GI Status: BM 12/10  Pertinent Labs: today's glucose 123; 12/11 Sodium 138, Potassium 4.2, BUN 25, Creatinine 0.6  Pertinent Meds: bumex, folvite, lantus, humalog, fibercon, prn glycolax      Wound Type: Pressure Injury, Stage II (coccyx; stage 1 buttocks;  skin tear abdomen; 10/12 ENT surgery: Cricotracheal Resection, Laryngoplasty with Flap and Vocal Fold Lateralization Stitch Placement; debridement completed 11/16)       Current Nutrition Intake & Therapies:    Average time  Coordination of Nutrition Care: Continue to monitor while inpatient, Interdisciplinary Rounds       Goals:  Previous Goal Met: Progressing toward Goal(s)  Goals: Tolerate nutrition support at goal rate, by next RD assessment       Nutrition Monitoring and Evaluation:      Food/Nutrient Intake Outcomes: Enteral Nutrition Intake/Tolerance  Physical Signs/Symptoms Outcomes: Biochemical Data, Chewing or Swallowing, GI Status, Fluid Status or Edema, Hemodynamic Status, Nutrition Focused Physical Findings, Skin, Weight    Discharge Planning:     Too soon to determine     Viri Munguia RD, LD  Contact: [phone]

## 2023-12-12 NOTE — PLAN OF CARE
Problem: Respiratory - Adult  Goal: Normal spontaneous ventilation  Outcome: Progressing                                                Patient Weaning Progress    The patient's vent settings was able to be weaned this shift. Ventilator settings that were weaned              [x] Mode   [] Pressure support weaned   [] Fio2 weaned   [] Peep weaned      Spontaneous weaning trial  was attempted. due to defined parameters for SBT (spontaneous breathing trial) not being met. *Results of SBT documented under SBTOUTCOME note. Reason that defined ventilator parameters for SBT was not met              [] Patient condition requires increased ventilator settings  [] Requires increased sedation   [] Settings not within weaning range   [] SAT not completed   [] Physician orders    The patient was able to tolerate SBT. RSBI  was 50 with EtCO2 of 40 and SpO2 of 100 on 25% FiO2. Spontanteous VT was 534 and RR 14 breaths/min. Evac tube was  hooked up with continuous low suction(20-30mmHg)      Cuff was not  deflated to determine cuff leak. Unable to get agreement for goals because no family is present and patient cannot respond.

## 2023-12-12 NOTE — PLAN OF CARE
Problem: Respiratory - Adult  Goal: Normal spontaneous ventilation  12/12/2023 0747 by Garo Bedoya RCP  Outcome: Progressing     Problem: Respiratory - Adult  Goal: Achieves optimal ventilation and oxygenation  Outcome: Progressing  Flowsheets  Taken 12/12/2023 0740 by Garo Bedoya RCP  Achieves optimal ventilation and oxygenation:   Assess for changes in respiratory status   Position to facilitate oxygenation and minimize respiratory effort   Oxygen supplementation based on oxygen saturation or arterial blood gases   Assess the need for suctioning and aspirate as needed   Assess and instruct to report shortness of breath or any respiratory difficulty   Respiratory therapy support as indicated  Taken 12/11/2023 1944 by Yuli Mcfarland optimal ventilation and oxygenation:   Assess for changes in respiratory status   Position to facilitate oxygenation and minimize respiratory effort   Oxygen supplementation based on oxygen saturation or arterial blood gases   Assess the need for suctioning and aspirate as needed   Respiratory therapy support as indicated                                                Patient Weaning Progress    The patient's vent settings was not able to be weaned this shift. Ventilator settings that were weaned              [] Mode   [] Pressure support weaned   [] Fio2 weaned   [] Peep weaned      Spontaneous weaning trial  was attempted. *Results of SBT documented under SBTOUTCOME note. Reason that defined ventilator parameters for SBT was not met              [] Patient condition requires increased ventilator settings  [] Requires increased sedation   [] Settings not within weaning range   [] SAT not completed   [] Physician orders    The patient was able to tolerate SBT. Evac tube was  hooked up with continuous low suction(20-30mmHg)      Cuff was not  deflated to determine cuff leak.    Unable to get agreement for goals because no family is present and

## 2023-12-13 LAB
GLUCOSE BLD STRIP.AUTO-MCNC: 106 MG/DL (ref 70–108)
GLUCOSE BLD STRIP.AUTO-MCNC: 112 MG/DL (ref 70–108)
GLUCOSE BLD STRIP.AUTO-MCNC: 115 MG/DL (ref 70–108)
GLUCOSE BLD STRIP.AUTO-MCNC: 138 MG/DL (ref 70–108)

## 2023-12-13 PROCEDURE — 2700000000 HC OXYGEN THERAPY PER DAY

## 2023-12-13 PROCEDURE — 6360000002 HC RX W HCPCS: Performed by: INTERNAL MEDICINE

## 2023-12-13 PROCEDURE — 99232 SBSQ HOSP IP/OBS MODERATE 35: CPT | Performed by: INTERNAL MEDICINE

## 2023-12-13 PROCEDURE — 6370000000 HC RX 637 (ALT 250 FOR IP): Performed by: NURSE PRACTITIONER

## 2023-12-13 PROCEDURE — 2580000003 HC RX 258: Performed by: NURSE PRACTITIONER

## 2023-12-13 PROCEDURE — 82948 REAGENT STRIP/BLOOD GLUCOSE: CPT

## 2023-12-13 PROCEDURE — 2000000000 HC ICU R&B

## 2023-12-13 PROCEDURE — 94761 N-INVAS EAR/PLS OXIMETRY MLT: CPT

## 2023-12-13 PROCEDURE — 94003 VENT MGMT INPAT SUBQ DAY: CPT

## 2023-12-13 PROCEDURE — 6370000000 HC RX 637 (ALT 250 FOR IP): Performed by: INTERNAL MEDICINE

## 2023-12-13 RX ADMIN — ENOXAPARIN SODIUM 80 MG: 100 INJECTION SUBCUTANEOUS at 20:58

## 2023-12-13 RX ADMIN — FAMOTIDINE 20 MG: 20 TABLET ORAL at 20:58

## 2023-12-13 RX ADMIN — CHLORHEXIDINE GLUCONATE 0.12% ORAL RINSE 15 ML: 1.2 LIQUID ORAL at 20:58

## 2023-12-13 RX ADMIN — FAMOTIDINE 20 MG: 20 TABLET ORAL at 09:40

## 2023-12-13 RX ADMIN — MICONAZOLE NITRATE: 2 POWDER TOPICAL at 09:41

## 2023-12-13 RX ADMIN — SODIUM CHLORIDE, PRESERVATIVE FREE 10 ML: 5 INJECTION INTRAVENOUS at 20:58

## 2023-12-13 RX ADMIN — BUMETANIDE 0.5 MG: 0.5 TABLET ORAL at 09:40

## 2023-12-13 RX ADMIN — MICONAZOLE NITRATE: 2 POWDER TOPICAL at 20:58

## 2023-12-13 RX ADMIN — SODIUM CHLORIDE, PRESERVATIVE FREE 10 ML: 5 INJECTION INTRAVENOUS at 09:40

## 2023-12-13 RX ADMIN — ENOXAPARIN SODIUM 80 MG: 100 INJECTION SUBCUTANEOUS at 09:30

## 2023-12-13 RX ADMIN — CHLORHEXIDINE GLUCONATE 0.12% ORAL RINSE 15 ML: 1.2 LIQUID ORAL at 09:40

## 2023-12-13 ASSESSMENT — PULMONARY FUNCTION TESTS
PIF_VALUE: 15
PIF_VALUE: 16

## 2023-12-13 NOTE — CARE COORDINATION
times atypical triphasic morphology, activated with stimulation at the beginning of recording, consistent with SIRPIDs   10/23 BUE Venous Dopplers: There is noncompressibility and absent flow, likely secondary to acute thrombus, in the left axillary, brachial, basilic and cephalic veins. There is partial flow and compressibility in the left subclavian vein. There is noncompressibility and absent flow in the right cephalic and antecubital veins. Remaining vein segments demonstrate normal compressibility and flow  11/2 BUE Venous dopplers: There is acute appearing DVT in the left axillary vein; Chronic appearing DVT is present in the distal left subclavian vein; Superficial thrombophlebitis involving the left basilic vein; Overall this examination has improved since the prior exam dated 10/23/202  11/16 EEG 40 min: abnormal due to disorganized and slow background. Occasional generalized periodic discharges with triphasic morphology. At times there were increased frequency of these discharges with activation consistent with SIRPIDs. No electrographic seizures on this recording  11/16 Suspension Laryngoscopy and Bronchoscopy, with Debridement and steroid injection  11/16 4.5 hr EEG: abnormal.  Diffuse low amplitude polymorphic delta slowing and occasional triphasic waves suggested severe encephalopathy. No abnormal epileptiform activity was seen  12/8 4hr EEG: No seizures noted; suggests severe encelphalopathy  12/8 MRI Brain:  Interval development of significant global volume loss and severe abnormal signal throughout the white matter the brain and cerebellum consistent with leukoencephalopathy; Opacified left maxillary sinus with mucosal thickening in the ethmoid air cells and sphenoid sinus. There is also fluid in the mastoid air cells. Barriers to Discharge: Family has decided to withdraw care on 12/17/23. Pt now [facility]. Intensivist, ENT following. Remains on vent w/ETT on SBT, FIO2 25%, sats 97%. Tmax 99.4. NSR. Unresponsive. Pupils reactive. Unable to follow commands; moves BLEs to painful stim, decerebrate BUEs to painful stim. Remains off sedation. Dietitian and Wound Care following. Telemetry, NG w/TF, external urinary catheter, SCDs. PO bumex, peridex, lovenox, pepcid, lantus, SSI, desenex, Electrolyte replacement protocols. PCP: Cirilo Vidal MD  Readmission Risk Score: 19.1%  Patient Goals/Plan/Treatment Preferences: From home w/. Plan pending clinical course.

## 2023-12-13 NOTE — PLAN OF CARE
Problem: Chronic Conditions and Co-morbidities  Goal: Patient's chronic conditions and co-morbidity symptoms are monitored and maintained or improved  Outcome: Progressing  Flowsheets (Taken 12/12/2023 2322)  Care Plan - Patient's Chronic Conditions and Co-Morbidity Symptoms are Monitored and Maintained or Improved:   Monitor and assess patient's chronic conditions and comorbid symptoms for stability, deterioration, or improvement   Collaborate with multidisciplinary team to address chronic and comorbid conditions and prevent exacerbation or deterioration   Update acute care plan with appropriate goals if chronic or comorbid symptoms are exacerbated and prevent overall improvement and discharge     Problem: Safety - Adult  Goal: Free from fall injury  Outcome: Progressing  Flowsheets (Taken 12/12/2023 2322)  Free From Fall Injury:   Instruct family/caregiver on patient safety   Based on caregiver fall risk screen, instruct family/caregiver to ask for assistance with transferring infant if caregiver noted to have fall risk factors     Problem: Respiratory - Adult  Goal: Achieves optimal ventilation and oxygenation  Outcome: Progressing  Flowsheets (Taken 12/12/2023 2322)  Achieves optimal ventilation and oxygenation:   Assess the need for suctioning and aspirate as needed   Respiratory therapy support as indicated   Assess for changes in respiratory status     Problem: Nutrition Deficit:  Goal: Optimize nutritional status  Outcome: Progressing  Flowsheets (Taken 12/12/2023 2322)  Nutrient intake appropriate for improving, restoring, or maintaining nutritional needs:   Assess nutritional status and recommend course of action   Recommend, monitor, and adjust tube feedings and TPN/PPN based on assessed needs     Problem: Skin/Tissue Integrity  Goal: Absence of new skin breakdown  Description: 1. Monitor for areas of redness and/or skin breakdown  2. Assess vascular access sites hourly  3.   Every 4-6 hours minimum: Progressing  Flowsheets (Taken 12/12/2023 2322)  Maintain proper alignment of affected body part:   Support and protect limb and body alignment per provider's orders   Instruct and reinforce with patient and family use of appropriate assistive device and precautions (e.g. spinal or hip dislocation precautions)     Problem: Discharge Planning  Goal: Discharge to home or other facility with appropriate resources  Outcome: Not Progressing  Flowsheets (Taken 12/12/2023 2322)  Discharge to home or other facility with appropriate resources:   Identify barriers to discharge with patient and caregiver   Identify discharge learning needs (meds, wound care, etc)   Refer to discharge planning if patient needs post-hospital services based on physician order or complex needs related to functional status, cognitive ability or social support system   Arrange for needed discharge resources and transportation as appropriate     Problem: Pain  Goal: Verbalizes/displays adequate comfort level or baseline comfort level  Outcome: Not Progressing  Flowsheets (Taken 12/12/2023 2322)  Verbalizes/displays adequate comfort level or baseline comfort level:   Administer analgesics based on type and severity of pain and evaluate response   Assess pain using appropriate pain scale   Implement non-pharmacological measures as appropriate and evaluate response     Problem: Neurosensory - Adult  Goal: Achieves stable or improved neurological status  Outcome: Not Progressing  Flowsheets (Taken 12/12/2023 2322)  Achieves stable or improved neurological status:   Assess for and report changes in neurological status   Initiate measures to prevent increased intracranial pressure   Maintain blood pressure and fluid volume within ordered parameters to optimize cerebral perfusion and minimize risk of hemorrhage   Monitor temperature, glucose, and sodium.  Initiate appropriate interventions as ordered     Problem: Coping  Goal: Pt/Family able to verbalize

## 2023-12-13 NOTE — PROGRESS NOTES
Patient:  Andres Chavira    Unit/Bed:4D-08/008-A  MRN: [id number]   PCP: Stephanie Campbell MD  Date of Admission: 10/12/2023    Assessment and Plan(All pulmonary edema, renal failure, PE, and respiratory failure diagnoses are acute in nature unless otherwise specified): Anoxic Brain Injury: Highest GCS obtained is 8. Mostly stays at GCS 5. GCS did reach 7 on 11/27/23 which represents an isolated event. GCS 5 remains the baseline. Patient continues with ventilator support to allow time to determine extent of recovery. Injury is severe persistent vegetative state. Multiple EEGs and continuous EEG show no seizure activity. EEG is consistent with severe encephalopathy. Initial MRIs and CTs head show no structural injury. Patient did not qualify for hypothermia therapy as she had respiratory arrest leading to cardiac arrest.  There is no data to show recovery benefit in this population. She did receive hypertonic saline to minimize risk of cerebral edema keeping serum sodium 145-150. EEG completed 12/8/23 shows ongoing cortical dysfunction and severe nonspecific encephalopathy. 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 Seems to have reached plateau of neurologic recovery. GCS 5 is the baseline. Family plans to withdraw on 12/17/23. Acute Respiratory Failure:      Post Cricotracheal resection with laryngoplasty: 10/12/23. Dr. Jean Marie Bell following. Debridement completed 11/16/23. Bilateral DVT:   DM II:   Diastolic Heart Failure:   HTN:    Anemia:  Edema:    Nutrition: On TF. GI prophylaxis: Pepcid. Hypotension:  Resolved. .  Tongue biting:   Resolved. CC:  Anoxic Brain Injury  HPI: Patient is a 81year old white female nonsmoker. She has a history of CAD with stenting in 2008. She has degenerative arthritis, diabetic neuropathy with bilateral feet numbness at baseline.   She

## 2023-12-14 LAB
GLUCOSE BLD STRIP.AUTO-MCNC: 126 MG/DL (ref 70–108)
GLUCOSE BLD STRIP.AUTO-MCNC: 133 MG/DL (ref 70–108)
GLUCOSE BLD STRIP.AUTO-MCNC: 133 MG/DL (ref 70–108)
GLUCOSE BLD STRIP.AUTO-MCNC: 163 MG/DL (ref 70–108)

## 2023-12-14 PROCEDURE — 6360000002 HC RX W HCPCS: Performed by: INTERNAL MEDICINE

## 2023-12-14 PROCEDURE — 6370000000 HC RX 637 (ALT 250 FOR IP): Performed by: NURSE PRACTITIONER

## 2023-12-14 PROCEDURE — 99232 SBSQ HOSP IP/OBS MODERATE 35: CPT | Performed by: INTERNAL MEDICINE

## 2023-12-14 PROCEDURE — 82948 REAGENT STRIP/BLOOD GLUCOSE: CPT

## 2023-12-14 PROCEDURE — 2000000000 HC ICU R&B

## 2023-12-14 PROCEDURE — 6370000000 HC RX 637 (ALT 250 FOR IP): Performed by: INTERNAL MEDICINE

## 2023-12-14 PROCEDURE — 94761 N-INVAS EAR/PLS OXIMETRY MLT: CPT

## 2023-12-14 PROCEDURE — 2700000000 HC OXYGEN THERAPY PER DAY

## 2023-12-14 PROCEDURE — 94003 VENT MGMT INPAT SUBQ DAY: CPT

## 2023-12-14 PROCEDURE — 2580000003 HC RX 258: Performed by: NURSE PRACTITIONER

## 2023-12-14 RX ORDER — SCOLOPAMINE TRANSDERMAL SYSTEM 1 MG/1
1 PATCH, EXTENDED RELEASE TRANSDERMAL
Status: DISCONTINUED | OUTPATIENT
Start: 2023-12-14 | End: 2023-12-17 | Stop reason: HOSPADM

## 2023-12-14 RX ADMIN — CHLORHEXIDINE GLUCONATE 0.12% ORAL RINSE 15 ML: 1.2 LIQUID ORAL at 09:11

## 2023-12-14 RX ADMIN — BUMETANIDE 0.5 MG: 0.5 TABLET ORAL at 09:12

## 2023-12-14 RX ADMIN — CHLORHEXIDINE GLUCONATE 0.12% ORAL RINSE 15 ML: 1.2 LIQUID ORAL at 21:37

## 2023-12-14 RX ADMIN — SODIUM CHLORIDE, PRESERVATIVE FREE 10 ML: 5 INJECTION INTRAVENOUS at 09:11

## 2023-12-14 RX ADMIN — FAMOTIDINE 20 MG: 20 TABLET ORAL at 21:36

## 2023-12-14 RX ADMIN — FAMOTIDINE 20 MG: 20 TABLET ORAL at 09:14

## 2023-12-14 RX ADMIN — MICONAZOLE NITRATE: 2 POWDER TOPICAL at 09:11

## 2023-12-14 RX ADMIN — SODIUM CHLORIDE, PRESERVATIVE FREE 10 ML: 5 INJECTION INTRAVENOUS at 21:37

## 2023-12-14 RX ADMIN — ENOXAPARIN SODIUM 80 MG: 100 INJECTION SUBCUTANEOUS at 09:14

## 2023-12-14 RX ADMIN — MICONAZOLE NITRATE: 2 POWDER TOPICAL at 21:36

## 2023-12-14 RX ADMIN — ENOXAPARIN SODIUM 80 MG: 100 INJECTION SUBCUTANEOUS at 21:36

## 2023-12-14 ASSESSMENT — PULMONARY FUNCTION TESTS
PIF_VALUE: 15
PIF_VALUE: 17

## 2023-12-14 NOTE — PROGRESS NOTES
Patient:  Deja Hunter    Unit/Bed:4D-08/008-A  MRN: [id number]   PCP: Theodore Jon MD  Date of Admission: 10/12/2023    Assessment and Plan(All pulmonary edema, renal failure, PE, and respiratory failure diagnoses are acute in nature unless otherwise specified): Anoxic Brain Injury: Highest GCS obtained is 8. Mostly stays at GCS 5. GCS did reach 7 on 11/27/23 which represents an isolated event. GCS 5 remains the baseline. Patient continues with ventilator support to allow time to determine extent of recovery. Injury is severe persistent vegetative state. Multiple EEGs and continuous EEG show no seizure activity. EEG is consistent with severe encephalopathy. Initial MRIs and CTs head show no structural injury. Patient did not qualify for hypothermia therapy as she had respiratory arrest leading to cardiac arrest.  There is no data to show recovery benefit in this population. She did receive hypertonic saline to minimize risk of cerebral edema keeping serum sodium 145-150. EEG completed 12/8/23 shows ongoing cortical dysfunction and severe nonspecific encephalopathy. 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 Seems to have reached plateau of neurologic recovery. GCS 5 is the baseline. Family plans to withdraw on 12/17/23 at 11 am.    Acute Respiratory Failure:      Post Cricotracheal resection with laryngoplasty: 10/12/23. Dr. Donald Deluna following. Debridement completed 11/16/23. Bilateral DVT:   DM II:   Diastolic Heart Failure:   HTN:    Anemia:  Edema:    Nutrition: On TF. GI prophylaxis: Pepcid. Hypotension:  Resolved. .  Tongue biting:   Resolved. CC:  Anoxic Brain Injury  HPI: Patient is a 81year old white female nonsmoker. She has a history of CAD with stenting in 2008.   She has degenerative arthritis, diabetic neuropathy with bilateral feet numbness at

## 2023-12-14 NOTE — PROGRESS NOTES
Patient Weaning Progress    The patient's vent settings was not able to be weaned this shift. Spontaneous weaning trial  was attempted. Evac tube was not  hooked up with continuous low suction(20-30mmHg)      Cuff was not  deflated to determine cuff leak. Unable to get agreement for goals because no family is present and patient cannot respond.

## 2023-12-14 NOTE — PLAN OF CARE
Problem: Respiratory - Adult  Goal: Achieves optimal ventilation and oxygenation  Outcome: Not Progressing  Flowsheets (Taken 12/13/2023 2240)  Achieves optimal ventilation and oxygenation:   Assess for changes in respiratory status   Position to facilitate oxygenation and minimize respiratory effort   Assess the need for suctioning and aspirate as needed   Respiratory therapy support as indicated   Assess for changes in mentation and behavior   Oxygen supplementation based on oxygen saturation or arterial blood gases   Encourage broncho-pulmonary hygiene including cough, deep breathe, incentive spirometry   Assess and instruct to report shortness of breath or any respiratory difficulty  Note: Intubated/Vented.  Wean as tolerated

## 2023-12-14 NOTE — PLAN OF CARE
Problem: Respiratory - Adult  Goal: Normal spontaneous ventilation  Outcome: Progressing  Note: Pt remains on cpap and tolerating well.    Goal: Achieves optimal ventilation and oxygenation  12/14/2023 0004 by Ab Cochran RN  Outcome: Not Progressing  Flowsheets (Taken 12/13/2023 2240 by Monica Matt RCP)  Achieves optimal ventilation and oxygenation:   Assess for changes in respiratory status   Position to facilitate oxygenation and minimize respiratory effort   Assess the need for suctioning and aspirate as needed   Respiratory therapy support as indicated   Assess for changes in mentation and behavior   Oxygen supplementation based on oxygen saturation or arterial blood gases   Encourage broncho-pulmonary hygiene including cough, deep breathe, incentive spirometry   Assess and instruct to report shortness of breath or any respiratory difficulty     Problem: Discharge Planning  Goal: Discharge to home or other facility with appropriate resources  12/14/2023 0004 by Ab Cochran RN  Outcome: Not Progressing  Flowsheets (Taken 12/12/2023 2322)  Discharge to home or other facility with appropriate resources:   Identify barriers to discharge with patient and caregiver   Identify discharge learning needs (meds, wound care, etc)   Refer to discharge planning if patient needs post-hospital services based on physician order or complex needs related to functional status, cognitive ability or social support system   Arrange for needed discharge resources and transportation as appropriate     Problem: Pain  Goal: Verbalizes/displays adequate comfort level or baseline comfort level  12/14/2023 0004 by Ab Cochran RN  Outcome: Not Progressing  Flowsheets (Taken 12/12/2023 2322)  Verbalizes/displays adequate comfort level or baseline comfort level:   Administer analgesics based on type and severity of pain and evaluate response   Assess pain using appropriate pain scale   Implement non-pharmacological measures

## 2023-12-14 NOTE — PLAN OF CARE
in respiratory status   Position to facilitate oxygenation and minimize respiratory effort   Assess the need for suctioning and aspirate as needed   Respiratory therapy support as indicated   Assess for changes in mentation and behavior   Oxygen supplementation based on oxygen saturation or arterial blood gases   Encourage broncho-pulmonary hygiene including cough, deep breathe, incentive spirometry   Assess and instruct to report shortness of breath or any respiratory difficulty  Note: Intubated/Vented. Wean as tolerated     Problem: Neurosensory - Adult  Goal: Achieves stable or improved neurological status  Outcome: Not Progressing  Flowsheets (Taken 12/12/2023 2322)  Achieves stable or improved neurological status:   Assess for and report changes in neurological status   Initiate measures to prevent increased intracranial pressure   Maintain blood pressure and fluid volume within ordered parameters to optimize cerebral perfusion and minimize risk of hemorrhage   Monitor temperature, glucose, and sodium. Initiate appropriate interventions as ordered     Problem: Coping  Goal: Pt/Family able to verbalize concerns and demonstrate effective coping strategies  Description: INTERVENTIONS:  1. Assist patient/family to identify coping skills, available support systems and cultural and spiritual values  2. Provide emotional support, including active listening and acknowledgement of concerns of patient and caregivers  3. Reduce environmental stimuli, as able  4. Instruct patient/family in relaxation techniques, as appropriate  5.  Assess for spiritual pain/suffering and initiate Spiritual Care, Psychosocial Clinical Specialist consults as needed  Outcome: Not Progressing  Flowsheets (Taken 12/12/2023 2322)  Patient/family able to verbalize anxieties, fears, and concerns, and demonstrate effective coping:   Assess for spiritual pain/suffering and initiate Spiritual Care, Psychosocial Clinical Specialist consults as needed

## 2023-12-15 ENCOUNTER — APPOINTMENT (OUTPATIENT)
Dept: GENERAL RADIOLOGY | Age: 73
DRG: 163 | End: 2023-12-15
Attending: OTOLARYNGOLOGY
Payer: MEDICARE

## 2023-12-15 LAB
ARTERIAL PATENCY WRIST A: POSITIVE
BASE EXCESS BLDA CALC-SCNC: 6.1 MMOL/L (ref -2.5–2.5)
BDY SITE: ABNORMAL
COLLECTED BY:: ABNORMAL
DEVICE: ABNORMAL
FIO2 ON VENT O2 ANALYZER: 25 %
GLUCOSE BLD STRIP.AUTO-MCNC: 134 MG/DL (ref 70–108)
GLUCOSE BLD STRIP.AUTO-MCNC: 145 MG/DL (ref 70–108)
GLUCOSE BLD STRIP.AUTO-MCNC: 152 MG/DL (ref 70–108)
GLUCOSE BLD STRIP.AUTO-MCNC: 160 MG/DL (ref 70–108)
HCO3 BLDA-SCNC: 34 MMOL/L (ref 23–28)
PCO2 BLDA: 65 MMHG (ref 35–45)
PEEP SETTING VENT: 6 MMHG
PH BLDA: 7.32 [PH] (ref 7.35–7.45)
PIP: 10 CMH2O
PO2 BLDA: 168 MMHG (ref 71–104)
SAO2 % BLDA: 99 %
VENTILATION MODE VENT: ABNORMAL

## 2023-12-15 PROCEDURE — 99232 SBSQ HOSP IP/OBS MODERATE 35: CPT | Performed by: INTERNAL MEDICINE

## 2023-12-15 PROCEDURE — 94003 VENT MGMT INPAT SUBQ DAY: CPT

## 2023-12-15 PROCEDURE — 36600 WITHDRAWAL OF ARTERIAL BLOOD: CPT

## 2023-12-15 PROCEDURE — 2580000003 HC RX 258: Performed by: NURSE PRACTITIONER

## 2023-12-15 PROCEDURE — 2000000000 HC ICU R&B

## 2023-12-15 PROCEDURE — 94761 N-INVAS EAR/PLS OXIMETRY MLT: CPT

## 2023-12-15 PROCEDURE — 6370000000 HC RX 637 (ALT 250 FOR IP): Performed by: NURSE PRACTITIONER

## 2023-12-15 PROCEDURE — 2580000003 HC RX 258: Performed by: INTERNAL MEDICINE

## 2023-12-15 PROCEDURE — 6360000002 HC RX W HCPCS: Performed by: INTERNAL MEDICINE

## 2023-12-15 PROCEDURE — 82948 REAGENT STRIP/BLOOD GLUCOSE: CPT

## 2023-12-15 PROCEDURE — 6370000000 HC RX 637 (ALT 250 FOR IP): Performed by: INTERNAL MEDICINE

## 2023-12-15 PROCEDURE — 82803 BLOOD GASES ANY COMBINATION: CPT

## 2023-12-15 PROCEDURE — 2700000000 HC OXYGEN THERAPY PER DAY

## 2023-12-15 PROCEDURE — 2500000003 HC RX 250 WO HCPCS: Performed by: STUDENT IN AN ORGANIZED HEALTH CARE EDUCATION/TRAINING PROGRAM

## 2023-12-15 PROCEDURE — 71045 X-RAY EXAM CHEST 1 VIEW: CPT

## 2023-12-15 RX ORDER — LORAZEPAM 2 MG/ML
4 INJECTION INTRAMUSCULAR EVERY 4 HOURS PRN
Status: DISCONTINUED | OUTPATIENT
Start: 2023-12-15 | End: 2023-12-17 | Stop reason: HOSPADM

## 2023-12-15 RX ORDER — NOREPINEPHRINE BITARTRATE 0.06 MG/ML
1-100 INJECTION, SOLUTION INTRAVENOUS CONTINUOUS
Status: DISCONTINUED | OUTPATIENT
Start: 2023-12-15 | End: 2023-12-17 | Stop reason: HOSPADM

## 2023-12-15 RX ADMIN — FAMOTIDINE 20 MG: 20 TABLET ORAL at 08:59

## 2023-12-15 RX ADMIN — BUMETANIDE 0.5 MG: 0.5 TABLET ORAL at 09:00

## 2023-12-15 RX ADMIN — HYDROMORPHONE HYDROCHLORIDE 1.5 MG/HR: 10 INJECTION, SOLUTION INTRAMUSCULAR; INTRAVENOUS; SUBCUTANEOUS at 16:51

## 2023-12-15 RX ADMIN — ENOXAPARIN SODIUM 80 MG: 100 INJECTION SUBCUTANEOUS at 22:27

## 2023-12-15 RX ADMIN — MICONAZOLE NITRATE: 2 POWDER TOPICAL at 08:59

## 2023-12-15 RX ADMIN — INSULIN GLARGINE 30 UNITS: 100 INJECTION, SOLUTION SUBCUTANEOUS at 22:28

## 2023-12-15 RX ADMIN — CHLORHEXIDINE GLUCONATE 0.12% ORAL RINSE 15 ML: 1.2 LIQUID ORAL at 08:59

## 2023-12-15 RX ADMIN — SODIUM CHLORIDE, PRESERVATIVE FREE 10 ML: 5 INJECTION INTRAVENOUS at 08:59

## 2023-12-15 RX ADMIN — FAMOTIDINE 20 MG: 20 TABLET ORAL at 22:27

## 2023-12-15 RX ADMIN — CHLORHEXIDINE GLUCONATE 0.12% ORAL RINSE 15 ML: 1.2 LIQUID ORAL at 22:27

## 2023-12-15 RX ADMIN — ENOXAPARIN SODIUM 80 MG: 100 INJECTION SUBCUTANEOUS at 09:00

## 2023-12-15 RX ADMIN — MICONAZOLE NITRATE: 2 POWDER TOPICAL at 22:28

## 2023-12-15 RX ADMIN — Medication 2 MCG/MIN: at 19:24

## 2023-12-15 ASSESSMENT — PULMONARY FUNCTION TESTS
PIF_VALUE: 16
PIF_VALUE: 15
PIF_VALUE: 16

## 2023-12-15 ASSESSMENT — PAIN SCALES - GENERAL
PAINLEVEL_OUTOF10: 0
PAINLEVEL_OUTOF10: 0

## 2023-12-15 ASSESSMENT — PAIN SCALES - WONG BAKER: WONGBAKER_NUMERICALRESPONSE: 0

## 2023-12-15 NOTE — PROGRESS NOTES
Patient:  Samm Flannery    Unit/Bed:4D-08/008-A  MRN: [id number]   PCP: Linda Win MD  Date of Admission: 10/12/2023    Assessment and Plan(All pulmonary edema, renal failure, PE, and respiratory failure diagnoses are acute in nature unless otherwise specified): Anoxic Brain Injury: Highest GCS obtained is 8. Mostly stays at GCS 5. GCS did reach 7 on 11/27/23 which represents an isolated event. GCS 5 remains the baseline. Patient continues with ventilator support to allow time to determine extent of recovery. Injury is severe persistent vegetative state. Multiple EEGs and continuous EEG show no seizure activity. EEG is consistent with severe encephalopathy. Initial MRIs and CTs head show no structural injury. Patient did not qualify for hypothermia therapy as she had respiratory arrest leading to cardiac arrest.  There is no data to show recovery benefit in this population. She did receive hypertonic saline to minimize risk of cerebral edema keeping serum sodium 145-150. EEG completed 12/8/23 shows ongoing cortical dysfunction and severe nonspecific encephalopathy. 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 Seems to have reached plateau of neurologic recovery. GCS 5 is the baseline. Family plans to withdraw on 12/17/23 at 11 am.    Acute Respiratory Failure:      Post Cricotracheal resection with laryngoplasty: 10/12/23. Dr. Ricci Mcdaniel following. Debridement completed 11/16/23. Bilateral DVT:   DM II:   Diastolic Heart Failure:   HTN:    Anemia:  Edema:    Nutrition: On TF. GI prophylaxis: Pepcid. Hypotension:  Resolved. .  Tongue biting:   Resolved. CC:  Anoxic Brain Injury  HPI: Patient is a 81year old white female nonsmoker. She has a history of CAD with stenting in 2008.   She has degenerative arthritis, diabetic neuropathy with bilateral feet numbness at baseline. She has type II DM, previous PE in January 2023, hyperlipidemia, and HTN. Patient has history of COVID-19 with associated respiratory failure requiring intubation and subsequent tracheosotmy tube placement. She developed tracheal stenosis. Repeated laryngoscopy with dilation did not sustain improvement. She was admitted to [facility] on 10/12/23 and underwnet cricotracheal resection with laryngoplasty. She failed post-operative extubation due to airway inflammation. She was maintained on mechanical ventilator until extubation trial 10/16. At extubation, she fully collapsed her airway trapping air in the chest.  She could not exchange respirations meaning she could not move air in or out of the airway. She developed respiratory arrest and subsequent cardiac arrest.  Ambu respirations could not penetrate the airway and filled the stomach as airway had acutely collapsed. She was emergently intubated with difficulty. Her ROSC was achieved with CPR and epi. ROSC was achieved before intubation could be secured. Unfortunately, patient suffered severe anoxic brain injury. Sequential CT and MRIs did not show structural injury. However, patient initially was comatose with progression to persistent vegetative state. Follow up MRI 12/8/23 shows severe structural injury supporting clinical findings that recovery is not expected. On 12/11/23, I met with the  and 2 daughters. We reviewed the timeline and lack of significant neurologic recovery. MRI shows leukoencephalopathy and EEG shows severe nonspecific encephalopathy. Family sees no path to recovery. I concur with their assessment. They plan to withdraw care on 12/17/23. Patient is now a DNR-CC. Dilaudid drip started on Friday. For further details, please review the assessment and plan.     Medications:     dextrose        scopolamine  1 patch TransDERmal Q72H    bumetanide  0.5 mg Oral Daily    enoxaparin  1 mg/kg SubCUTAneous Q12H

## 2023-12-15 NOTE — CARE COORDINATION
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 There is also nonocclusive thrombus within the left posterior tibial vein  10/16 Extubated to bipap  10/16 Code Blue  10/16 Reintubated  10/16 Bronch: thick bloody secretions in RLL removed  10/16 CXR: Mild stable cardiomegaly with pulmonary vascular congestion suspicious for congestive heart failure; Prominent pulmonary interstitium suspicious for pulmonary edema  10/17 5 hr EEG: No seizures noted; diffuse background attenuation and suppression without evidence of reactivity; severe encephalopathy  10/17 CT Head: Stable CT appearance the brain. No acute findings  10/17 MRI Brain: No acute findings  33/43 PICC left basilic - 82/7 PICC removed  10/18 CT Head: No acute findings  10/18 OR: ET Exchange bronhoscopy and lavage  10/19 4hr 40 min EEG: This EEG was abnormal. The background abnormalities indicated a moderate to severe encephalopathy, nonspecific to etiology. The generalized periodic discharges (GPDs) indicated underlying cortical irritability/increased risk of seizures, and can be seen in various encephalopathies. No seizures were recorded  10/19 MRI Brain: No evidence of an acute infarct. No evidence of anoxia; Stable mild severity chronic small vessel ischemic changes; Global volume loss  10/23 4-day EEG: Moderate to severe encephalopathy, improved as recording progressed.  Frequent and at times near continuous runs of GPDs with often typical and at

## 2023-12-15 NOTE — PLAN OF CARE
Problem: Respiratory - Adult  Goal: Achieves optimal ventilation and oxygenation  Outcome: Not Progressing  Achieves optimal ventilation and oxygenation:   Assess for changes in respiratory status   Position to facilitate oxygenation and minimize respiratory effort   Assess the need for suctioning and aspirate as needed   Respiratory therapy support as indicated   Assess for changes in mentation and behavior   Oxygen supplementation based on oxygen saturation or arterial blood gases   Encourage broncho-pulmonary hygiene including cough, deep breathe, incentive spirometry   Assess and instruct to report shortness of breath or any respiratory difficulty  Note: Intubated/Vented.  Wean as tolerated

## 2023-12-15 NOTE — PROGRESS NOTES
Comprehensive Nutrition Assessment    Type and Reason for Visit:  Reassess (Tube Feed Monitor)    Nutrition Recommendations/Plan:   Continue Glucerna 1.5 at goal of 40ml/hour  Continue 250ml free H20 every 6h as ordered 12/5 per Physician     Malnutrition Assessment:  Malnutrition Status: At risk for malnutrition (Comment) (11/20/23 1529)    Context:  Chronic Illness     Findings of the 6 clinical characteristics of malnutrition:  Energy Intake:  No significant decrease in energy intake (pt. tolerating TF since admit)  Weight Loss:  Greater than 10% over 6 months (31# or 16% in 5 months)     Body Fat Loss:  No significant body fat loss     Muscle Mass Loss:  No significant muscle mass loss Temples (temporalis), Clavicles (pectoralis & deltoids)  Fluid Accumulation:  Mild     Strength:  Not Performed    Nutrition Assessment:     Pt. stable from a nutritional standpoint AEB tolerating TF at goal while NPO as intubated. Remains at risk for further nutritional compromise r/t intubated s/p ENT surgery 10/12, admit d/t tracheal stenosis from trach placed 4/2022, complex COVID Hx; s/p code blue 10/16 - anoxic brain injury, increased nutrient needs to aid in wound healing, debridement 11/16, DVTs, LOS day 64 and underlying medical condition (DM - A1C 5.5% 1/2023, CAD,THR 2/2023).           Nutrition Related Findings:    Pt. Report/Treatments/Miscellaneous: pt. Seen; spoke with family at bedside re: nutrition POC; tolerating TF well; intubated  GI Status: BM x1 12/14  Pertinent Labs: glucose 145  Pertinent Meds: bumex, lantus, humalog    Wound Type: Pressure Injury, Stage II (coccyx; stage 1 buttocks;  skin tear abdomen; 10/12 ENT surgery: Cricotracheal Resection, Laryngoplasty with Flap and Vocal Fold Lateralization Stitch Placement; debridement completed 11/16)       Current Nutrition Intake & Therapies:    Average Meal Intake: NPO  Average Supplements Intake: NPO  ADULT TUBE FEEDING; Nasogastric; Diabetic; Continuous;

## 2023-12-15 NOTE — SIGNIFICANT EVENT
I spoke with the  today. We reviewed the goals of care. I reviewed the dosing of Dilaudid as a continuous drip. I explained that the drip was to ensure patient comfort. I explained that the patient would develop distress when ETT was removed as the airway would not be adequately open to allow smooth flow. The dosing was selected to minimize risk of inadequate sedation. As the patient will not survive, the focus is only on her comfort. I felt the bigger sin was to under sedate, and have her needlessly experience discomfort. If the patient were more heavily sedated, but passed comfortably, then that would be the best case. My goal is to avoid under sedation. He expressed his agreement with the plan of care. I offered him a lower rate or a delay in initiation if desired. He affirmed his agreement with the plan as detailed above. Electronically signed by Sukhjinder Johnson MD.

## 2023-12-15 NOTE — PLAN OF CARE
Problem: Respiratory - Adult  Goal: Normal spontaneous ventilation  Outcome: Not Progressing     Problem: Respiratory - Adult  Goal: Achieves optimal ventilation and oxygenation  Outcome: Not Progressing     Ventilator has been placed in pcv by Dr. Nish Soliz. Pt to be a terminal extubation on 12-17 per family decision-keeping pt comfortable.

## 2023-12-16 LAB
GLUCOSE BLD STRIP.AUTO-MCNC: 124 MG/DL (ref 70–108)
GLUCOSE BLD STRIP.AUTO-MCNC: 141 MG/DL (ref 70–108)
GLUCOSE BLD STRIP.AUTO-MCNC: 160 MG/DL (ref 70–108)
GLUCOSE BLD STRIP.AUTO-MCNC: 182 MG/DL (ref 70–108)
GLUCOSE BLD STRIP.AUTO-MCNC: 182 MG/DL (ref 70–108)

## 2023-12-16 PROCEDURE — 94003 VENT MGMT INPAT SUBQ DAY: CPT

## 2023-12-16 PROCEDURE — 82948 REAGENT STRIP/BLOOD GLUCOSE: CPT

## 2023-12-16 PROCEDURE — 51798 US URINE CAPACITY MEASURE: CPT

## 2023-12-16 PROCEDURE — 2000000000 HC ICU R&B

## 2023-12-16 PROCEDURE — 6370000000 HC RX 637 (ALT 250 FOR IP): Performed by: NURSE PRACTITIONER

## 2023-12-16 PROCEDURE — 6370000000 HC RX 637 (ALT 250 FOR IP): Performed by: INTERNAL MEDICINE

## 2023-12-16 PROCEDURE — 89220 SPUTUM SPECIMEN COLLECTION: CPT

## 2023-12-16 PROCEDURE — 6360000002 HC RX W HCPCS: Performed by: INTERNAL MEDICINE

## 2023-12-16 PROCEDURE — 2500000003 HC RX 250 WO HCPCS: Performed by: NURSE PRACTITIONER

## 2023-12-16 PROCEDURE — 2580000003 HC RX 258: Performed by: NURSE PRACTITIONER

## 2023-12-16 PROCEDURE — 2580000003 HC RX 258: Performed by: INTERNAL MEDICINE

## 2023-12-16 PROCEDURE — 2700000000 HC OXYGEN THERAPY PER DAY

## 2023-12-16 PROCEDURE — 94761 N-INVAS EAR/PLS OXIMETRY MLT: CPT

## 2023-12-16 RX ADMIN — MICONAZOLE NITRATE: 2 POWDER TOPICAL at 08:49

## 2023-12-16 RX ADMIN — SODIUM CHLORIDE, PRESERVATIVE FREE 10 ML: 5 INJECTION INTRAVENOUS at 22:00

## 2023-12-16 RX ADMIN — FAMOTIDINE 20 MG: 20 TABLET ORAL at 08:47

## 2023-12-16 RX ADMIN — CHLORHEXIDINE GLUCONATE 0.12% ORAL RINSE 15 ML: 1.2 LIQUID ORAL at 21:59

## 2023-12-16 RX ADMIN — FAMOTIDINE 20 MG: 20 TABLET ORAL at 21:59

## 2023-12-16 RX ADMIN — CHLORHEXIDINE GLUCONATE 0.12% ORAL RINSE 15 ML: 1.2 LIQUID ORAL at 08:47

## 2023-12-16 RX ADMIN — ENOXAPARIN SODIUM 80 MG: 100 INJECTION SUBCUTANEOUS at 21:58

## 2023-12-16 RX ADMIN — INSULIN GLARGINE 30 UNITS: 100 INJECTION, SOLUTION SUBCUTANEOUS at 21:58

## 2023-12-16 RX ADMIN — SODIUM CHLORIDE, PRESERVATIVE FREE 10 ML: 5 INJECTION INTRAVENOUS at 08:48

## 2023-12-16 RX ADMIN — MICONAZOLE NITRATE: 2 POWDER TOPICAL at 21:59

## 2023-12-16 RX ADMIN — BUMETANIDE 0.5 MG: 0.5 TABLET ORAL at 08:49

## 2023-12-16 RX ADMIN — HYDROMORPHONE HYDROCHLORIDE 1.5 MG/HR: 10 INJECTION, SOLUTION INTRAMUSCULAR; INTRAVENOUS; SUBCUTANEOUS at 21:17

## 2023-12-16 RX ADMIN — ENOXAPARIN SODIUM 80 MG: 100 INJECTION SUBCUTANEOUS at 08:46

## 2023-12-16 ASSESSMENT — PULMONARY FUNCTION TESTS
PIF_VALUE: 17
PIF_VALUE: 19
PIF_VALUE: 23
PIF_VALUE: 19

## 2023-12-16 ASSESSMENT — PAIN SCALES - GENERAL
PAINLEVEL_OUTOF10: 0

## 2023-12-16 NOTE — PLAN OF CARE
Problem: Respiratory - Adult  Goal: Normal spontaneous ventilation  12/16/2023 0728 by Franc Lazar RCP  Outcome: Not Progressing  Goal: Achieves optimal ventilation and oxygenation  12/16/2023 0728 by Franc Lazar RCP  Outcome: Not Progressing      Ventilator currently in PCV+ mode per Dr. Joya Britt.   PT to be terminally extubated on 12-17 per family decision

## 2023-12-16 NOTE — PLAN OF CARE
Problem: Respiratory - Adult  Goal: Normal spontaneous ventilation  12/16/2023 0737 by Truman Edwards RCP  Outcome: Progressing  Goal: Achieves optimal ventilation and oxygenation  12/16/2023 0737 by Truman Edwards RCP  Outcome: Progressing                                               Patient Weaning Progress    The patient's vent settings was able to be weaned this shift. Ventilator settings that were weaned              [] Mode   [x] Pressure support weaned   [] Fio2 weaned   [] Peep weaned      Spontaneous weaning trial  was not attempted. due to defined parameters for SBT (spontaneous breathing trial) not being met. *Results of SBT documented under SBTOUTCOME note. Reason that defined ventilator parameters for SBT was not met              [] Patient condition requires increased ventilator settings  [] Requires increased sedation   [x] Settings not within weaning range   [] SAT not completed   [] Physician orders      Cuff was not  deflated to determine cuff leak. Unable to get agreement for goals because no family is present and patient cannot respond.

## 2023-12-17 VITALS
TEMPERATURE: 100.8 F | SYSTOLIC BLOOD PRESSURE: 118 MMHG | HEIGHT: 62 IN | DIASTOLIC BLOOD PRESSURE: 53 MMHG | WEIGHT: 166.01 LBS | BODY MASS INDEX: 30.55 KG/M2

## 2023-12-17 LAB
GLUCOSE BLD STRIP.AUTO-MCNC: 162 MG/DL (ref 70–108)
GLUCOSE BLD STRIP.AUTO-MCNC: 179 MG/DL (ref 70–108)

## 2023-12-17 PROCEDURE — 6370000000 HC RX 637 (ALT 250 FOR IP): Performed by: INTERNAL MEDICINE

## 2023-12-17 PROCEDURE — 94761 N-INVAS EAR/PLS OXIMETRY MLT: CPT

## 2023-12-17 PROCEDURE — 94003 VENT MGMT INPAT SUBQ DAY: CPT

## 2023-12-17 PROCEDURE — 82948 REAGENT STRIP/BLOOD GLUCOSE: CPT

## 2023-12-17 PROCEDURE — 2700000000 HC OXYGEN THERAPY PER DAY

## 2023-12-17 PROCEDURE — 6360000002 HC RX W HCPCS: Performed by: INTERNAL MEDICINE

## 2023-12-17 PROCEDURE — 2580000003 HC RX 258: Performed by: NURSE PRACTITIONER

## 2023-12-17 RX ADMIN — SODIUM CHLORIDE, PRESERVATIVE FREE 10 ML: 5 INJECTION INTRAVENOUS at 07:31

## 2023-12-17 RX ADMIN — ENOXAPARIN SODIUM 80 MG: 100 INJECTION SUBCUTANEOUS at 08:45

## 2023-12-17 RX ADMIN — LORAZEPAM 4 MG: 2 INJECTION, SOLUTION INTRAMUSCULAR; INTRAVENOUS at 11:02

## 2023-12-17 RX ADMIN — MICONAZOLE NITRATE: 2 POWDER TOPICAL at 07:30

## 2023-12-17 ASSESSMENT — PULMONARY FUNCTION TESTS
PIF_VALUE: 17

## 2023-12-17 NOTE — DISCHARGE SUMMARY
Patient:  Kanwal Becerril                            Unit/Bed:4D-08/008-A  MRN: [id number]            PCP: Edd Saez MD  Date of Admission: 10/12/2023     Assessment and Plan(All pulmonary edema, renal failure, PE, and respiratory failure diagnoses are acute in nature unless otherwise specified): Anoxic Brain Injury:   Acute Respiratory Failure:      Post Cricotracheal resection with laryngoplasty:   Bilateral DVT:   DM II:   Diastolic Heart Failure:   HTN:    Anemia:  Edema:    Nutrition: On TF. GI prophylaxis: Pepcid. Hypotension:  Resolved. .  Tongue biting:   Resolved. CC:  Anoxic Brain Injury  HPI: Patient is a 81year old white female nonsmoker. She has a history of CAD with stenting in 2008. She has degenerative arthritis, diabetic neuropathy with bilateral feet numbness at baseline. She has type II DM, previous PE in January 2023, hyperlipidemia, and HTN. Patient has history of COVID-19 with associated respiratory failure requiring intubation and subsequent tracheosotmy tube placement. She developed tracheal stenosis. Repeated laryngoscopy with dilation did not sustain improvement. She was admitted to [facility] on 10/12/23 and underwnet cricotracheal resection with laryngoplasty. She failed post-operative extubation due to airway inflammation. She was maintained on mechanical ventilator until extubation trial 10/16. At extubation, she fully collapsed her airway trapping air in the chest.  She could not exchange respirations meaning she could not move air in or out of the airway. She developed respiratory arrest and subsequent cardiac arrest.  Ambu respirations could not penetrate the airway and filled the stomach as airway had acutely collapsed. She was emergently intubated with difficulty. Her ROSC was achieved with CPR and epi. ROSC was achieved before intubation could be secured. Unfortunately, patient suffered severe anoxic brain injury.   Sequential CT and MRIs did not show

## 2023-12-17 NOTE — PLAN OF CARE
Problem: Respiratory - Adult  Goal: Normal spontaneous ventilation  Outcome: Not Progressing  Goal: Achieves optimal ventilation and oxygenation  Outcome: Not Progressing                                                 Patient Weaning Progress    The patient's vent settings was not able to be weaned this shift. Ventilator settings that were weaned          [] Mode   [] Pressure support weaned   [] Fio2 weaned   [] Peep weaned      Spontaneous weaning trial  was not attempted. due to defined parameters for SBT (spontaneous breathing trial) not being met. *Results of SBT documented under SBTOUTCOME note. Reason that defined ventilator parameters for SBT was not met          [x] Patient condition requires increased ventilator settings  [] Requires increased sedation   [] Settings not within weaning range   [] SAT not completed   [] Physician orders      Cuff was not  deflated to determine cuff leak. Unable to get agreement for goals because no family is present and patient cannot respond.

## 2023-12-17 NOTE — PROGRESS NOTES
Patient:  Oscar Duff    Unit/Bed:4D-08/008-A  MRN: [id number]   PCP: Toy Nettles MD  Date of Admission: 10/12/2023    Assessment and Plan(All pulmonary edema, renal failure, PE, and respiratory failure diagnoses are acute in nature unless otherwise specified):        Assessment and Plan(All pulmonary edema, renal failure, PE, and respiratory failure diagnoses are acute in nature unless otherwise specified): Anoxic Brain Injury: Highest GCS obtained is 8. Mostly stays at GCS 5. GCS did reach 7 on 11/27/23 which represents an isolated event. GCS 5 remains the baseline. Patient continues with ventilator support. Injury is severe persistent vegetative state. Multiple EEGs and continuous EEG show no seizure activity. EEG is consistent with severe encephalopathy. Initial MRIs and CTs head show no structural injury. Patient did not qualify for hypothermia therapy as she had respiratory arrest leading to cardiac arrest.  There is no data to show recovery benefit in this population. She did receive hypertonic saline to minimize risk of cerebral edema keeping serum sodium 145-150. EEG completed 12/8/23 shows ongoing cortical dysfunction and severe nonspecific encephalopathy. 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 Seems to have reached plateau of neurologic recovery. GCS 5 is the baseline. Family plans to withdraw on 12/17/23 at 11 am.    Acute Respiratory Failure:  Maintain on ventilator. Currently requiring minimal vent settings. Mental status prohibits extubation. Post Cricotracheal resection with laryngoplasty: 10/12/23. Dr. Belkys Tesfaye following. Debridement completed 11/16/23. Bilateral DVT: Monitor  DM II: mild hyperglycemia. Continue current doses of lantus and lispro. Diastolic Heart Failure:   HTN:  Not currently an issue.  -920 systolic  Anemia: No intervention

## 2023-12-17 NOTE — DEATH NOTES
[facility]  Notice of Patient Passing      Patient Name- Kelly Lopez Way Number- [de-identified]   Attending Physician- Julius Carty MD    Admitted on-10/12/2023  6:19 AM     On 2023 at  patient was found in 4D08 with:   Absence of vital signs. Absence of neurological response. Confirmed time of death at . Physician or On-call Physician notified of time of death- yes    Family present at time of death- yes, multiple   Spiritual care present at time of death- no    Physician was notified and orders were obtained to release the body. Post-Mortem documentation completed; form printed, signed, and given to admitting.     Nina Guido RN RN Nursing Supervisor/ Manager  23   11:49 AM    ________________________________________________________________________    Name of  Home: [facility] Phone Number: [phone]    Who Will Sign Death Certificate:     [x] Dr. Julius Carty

## 2023-12-17 NOTE — PROGRESS NOTES
Assessment:  Xena Carrasco is a 72year old patient admitted in 4D bed 8. This  received a call notifying me that patient family will be gathering on  at 11:00 am to withdraw patient from life support. I agreed to come in and provide support. At the time of entry, medical staff have gathered with family around patient's bed. Patient was in bed none responsive. Family members along with patient's  were all in the room calm but tearful and grieving. Intervention:  During this encounter, I read scriptures and offered prayer and words of comfort and hope. I also shared with family that they will be asked to sign the death certificate for the hospital to release the  to the  home they have chosen. Patient's son told me that the body will  be taken to [facility] in [place]. Outcome:  Family was receptive to , encouraged, hopeful and appreciated the care they received from the staff as well as the spiritual care I provided. Spiritual Care staff will hold up the family in prayers in the coming days as they make  preparation for Xena Carrasco. May  her soul rest in peace. No further follow up is needed at this time.

## 2024-02-16 NOTE — PLAN OF CARE
Problem: Discharge Planning  Goal: Discharge to home or other facility with appropriate resources  Outcome: Not Progressing     Problem: Chronic Conditions and Co-morbidities  Goal: Patient's chronic conditions and co-morbidity symptoms are monitored and maintained or improved  Outcome: Progressing     Problem: Safety - Adult  Goal: Free from fall injury  Outcome: Progressing     Problem: Pain  Goal: Verbalizes/displays adequate comfort level or baseline comfort level  Outcome: Progressing     Problem: Respiratory - Adult  Goal: Achieves optimal ventilation and oxygenation  11/10/2023 1018 by Vira Burgos RN  Outcome: Not Progressing  11/9/2023 2134 by Nasir Yañez RCP  Outcome: Not Progressing  Flowsheets  Taken 11/9/2023 2134 by Nasir Yañez RCP  Achieves optimal ventilation and oxygenation:   Assess for changes in respiratory status   Position to facilitate oxygenation and minimize respiratory effort   Assess the need for suctioning and aspirate as needed   Respiratory therapy support as indicated   Assess for changes in mentation and behavior   Oxygen supplementation based on oxygen saturation or arterial blood gases   Encourage broncho-pulmonary hygiene including cough, deep breathe, incentive spirometry   Assess and instruct to report shortness of breath or any respiratory difficulty  Taken 11/9/2023 1634 by Refugio Callejas RCP  Achieves optimal ventilation and oxygenation: Respiratory therapy support as indicated  Taken 11/9/2023 0735 by Brea Badleras RN  Achieves optimal ventilation and oxygenation:   Assess for changes in respiratory status   Assess for changes in mentation and behavior   Position to facilitate oxygenation and minimize respiratory effort   Oxygen supplementation based on oxygen saturation or arterial blood gases  Note: Intubated/Vented. Wean as tolerated. SBT when appropriate.      Problem: Neurosensory - Adult  Goal: Achieves stable or improved neurological No

## 2024-06-13 NOTE — ED NOTES
PT being transported to [facility] with this RN and [employer], RN  02/02/23 9640 atorvastatin 20 mg oral tablet: 1 tab(s) orally once a day (at bedtime)  folic acid 1 mg oral tablet: 1 tab(s) orally once a day  metoprolol succinate 25 mg oral tablet, extended release: 1 tab(s) orally once a day  Multiple Vitamins oral tablet: 1 tab(s) orally once a day  pantoprazole 40 mg oral delayed release tablet: 1 tab(s) orally once a day (before a meal)

## 2024-08-15 NOTE — PROGRESS NOTES
"Diya Brooks"Segundo was seen and treated in our emergency department on 8/15/2024.  She may return to work on 08/19/2024.       If you have any questions or concerns, please don't hesitate to call.      Alana Galvan PA-C" Chelsea Nicole is a 73 y.o. female thatpresents for Post-COVID Symptoms          HPI    Patient states that she does not wish to establish with our practice but would like a 2nd opinion on long covid sx. Hospitalized at [facility] in  for COVID. Was intubated and eventually had a tracheostomy. Has been following with Dr Prudencio Jim for this. She is on prednisone 15mg BID. She is on continuous O2. She is on up to 5-6 L most days, has tried to get it down to 3.5 L. Can walk with a walker for at least 30 steps. I have reviewed the patient's past medical history, past surgical history, allergies,medications, social and family history and I have made updates where appropriate.     Past Medical History:   Diagnosis Date    Arthritis     Coronary artery disease     COVID     Hyperlipidemia     Primary hypertension     Type 2 diabetes mellitus ([facility] [address]        Past Surgical History:   Procedure Laterality Date    CARDIAC SURGERY      CATARACT REMOVAL Bilateral      SECTION      3 times    CORONARY ANGIOPLASTY WITH STENT PLACEMENT      stenting twice    EYE SURGERY      HIATAL HERNIA REPAIR      late     HYSTERECTOMY, TOTAL ABDOMINAL (CERVIX REMOVED)      in     LARYNGOSCOPY N/A 3/22/2022    SUSPENSON LARYNGOSCOPY WITH JET VENT, DILATION performed by Zain Guzman MD at [address] N/A 3/28/2022    SUSPENSION MICROLARYNGOSCOPY WITH BALLOON DILATION AND JET VENT, KENALOG INJECTION performed by Zain Guzman MD at [address] N/A 2022    Suspension Laryngoscopy  Lateral of Right True Vocal Cord, With Steroid Injection of Larynx And Tracheostomy Tube Change, debridement performed by Airam Zamudio MD at [address] N/A 8/10/2022    TRANSORAL LARYNGOPLASTY WITH LEFT PARTIAL ARYTENODECTOMY AND POSS LATERALIZATION, ADVANCEMENT FLAP RECONSTRUCTION WITH JET VENT,THERAPUTIC BRONCHOSCOPY WITH DEBRIEDMENT,WITH BALLOON DILITATION performed by Adina Soto Eliazar Keys MD at [address]  N/A 8/31/2022    Laryngoscopy with Dilation Debridement  Bronchoscopy with Dilation & Resection of Obstructing Tissues Transoral Laryngoplasty Left True Vocal Fold Lateralization left partial aryteniodectomy performed by Kehinde Donaldson MD at [address]      [place] [address] 4/1/2022    TRACHEOTOMY TUBE REPLACEMENT performed by Maureen Jesus MD at [facility] History     Tobacco Use    Smoking status: Never    Smokeless tobacco: Never   Vaping Use    Vaping Use: Never used    Passive vaping exposure: Yes   Substance Use Topics    Alcohol use: Not Currently     Comment: drinks 1 glass of wine cooler or wine about 3 times per year    Drug use: Never       Family History   Problem Relation Age of Onset    Parkinson's Disease Father     Lung Cancer Father          Review of Systems        PHYSICAL EXAM:  /78   Pulse 96   Temp 98.5 °F (36.9 °C) (Oral)   Resp 16   Ht 5' 2\" (1.575 m)   Wt 196 lb (88.9 kg)   LMP  (LMP Unknown)   SpO2 98%   BMI 35.85 kg/m²     Physical Exam  Vitals and nursing note reviewed. Constitutional:       General: She is not in acute distress. Appearance: She is well-developed and normal weight. She is not toxic-appearing or diaphoretic. HENT:      Head: Normocephalic and atraumatic. Right Ear: Hearing and external ear normal.      Left Ear: Hearing and external ear normal.      Nose: Nose normal.      Mouth/Throat:      Mouth: Mucous membranes are moist.      Dentition: Normal dentition. Eyes:      General: Lids are normal. No scleral icterus. Right eye: No discharge. Left eye: No discharge. Conjunctiva/sclera: Conjunctivae normal.   Neck:      Thyroid: No thyromegaly. Trachea: No tracheal deviation. Pulmonary:      Effort: Pulmonary effort is normal. No respiratory distress. Breath sounds: Normal breath sounds.    Abdominal:      General: There is no distension. Palpations: Abdomen is soft. Tenderness: There is no guarding. Musculoskeletal:         General: No tenderness. Normal range of motion. Cervical back: Normal range of motion. Right lower leg: No edema. Left lower leg: No edema. Skin:     General: Skin is dry. Findings: No erythema or rash. Neurological:      General: No focal deficit present. Mental Status: She is alert. Cranial Nerves: No cranial nerve deficit. Motor: No tremor or abnormal muscle tone. Coordination: Coordination normal.      Gait: Gait normal.   Psychiatric:         Attention and Perception: Attention normal.         Mood and Affect: Mood and affect normal.         Behavior: Behavior normal.         Thought Content: Thought content normal.         Judgment: Judgment normal.         ASSESSMENT & PLAN  Kanwal Salcido was seen today for post-covid symptoms. 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 Continue prednisone at current dosing, recheck in 2 weeks. Return in about 2 weeks (around 11/17/2022). The most recent results of the following tests were reviewed with the patient today: none    All copied or forwarded information in the progress note was verified by me to be accurate at the time of visit  Patient's past medical, surgical, social and family history were reviewed and updated     All patient questions answered. Patient voiced understanding.

## 2024-09-17 NOTE — PLAN OF CARE
Virtual Nurse reviewed DC packet, meds, f/u appts, diet, activity, s/s to monitor and report. Pt verbalized understanding. Bedside nurse notified that DC education was completed. Pt states is ready for DC and had no further questions.  Pt's pharmacy called pt while Maribell Nazario was reviewing d/c, pharmacy on file is out of antibiotics, [facility] is having pharnacy on site do meds to beds Home

## 2025-05-20 NOTE — PLAN OF CARE
Problem: Discharge Planning  Goal: Discharge to home or other facility with appropriate resources  1/22/2023 0015 by Nayan Alejo RN, BSN  Outcome: Progressing  Note: Patient plans to go home at discharge. Plan for discharge when stable.    1/21/2023 1126 by Ashlee Mcdowell RN  Outcome: Progressing     Problem: Skin/Tissue Integrity  Goal: Absence of new skin breakdown  Description: 1. Monitor for areas of redness and/or skin breakdown  2. Assess vascular access sites hourly  3. Every 4-6 hours minimum:  Change oxygen saturation probe site  4. Every 4-6 hours:  If on nasal continuous positive airway pressure, respiratory therapy assess nares and determine need for appliance change or resting period. Outcome: Progressing  Note: Patient shows no signs/symtpoms of new skin breakdown. Patient encouraged to change positions frequently. Foot of the bed elevated. Patient bottom red and unblanchable     Problem: Safety - Adult  Goal: Free from fall injury  Outcome: Progressing  Note: Patient fall risk. Patient had no falls this shift. RN educated on fall precautions/preventions. Patient's call light and possessions with in reach. Bed alarm activated with bed in lowest locked position. Bed alarm on in zone 1. Chair alarm being utilized. Hourly rounding preformed. Problem: Chronic Conditions and Co-morbidities  Goal: Patient's chronic conditions and co-morbidity symptoms are monitored and maintained or improved  Outcome: Progressing  Note: Co morbidities at base line     Problem: Pain  Goal: Verbalizes/displays adequate comfort level or baseline comfort level  Outcome: Progressing  Note: Patient rating pain 3. Patient's pain goal is 3. Pain medications given per order and pain is reassessed within 1 hour of administration. Patient tolerating pain medication well at this time. Pain assessed as needed and during hourly rounding. Education given on purpose and side effects of pain medications.  Education provided on handout and whiteboard.

## (undated) DEVICE — PACK SUCT CATHETER 14FR OPN WHSTL W NO VLV

## (undated) DEVICE — SYRINGE IRRIG 60ML SFT PLIABLE BLB EZ TO GRP 1 HND USE W/

## (undated) DEVICE — TUBING, SUCTION, 1/4" X 20', STRAIGHT: Brand: MEDLINE INDUSTRIES, INC.

## (undated) DEVICE — BLADE 1884031HRE TRICUT 4MM 27CM M4 IRR: Brand: TRICUT®

## (undated) DEVICE — GLOVE SURG SZ 7.5 L11.73IN FNGR THK9.8MIL STRW LTX POLYMER

## (undated) DEVICE — GOWN,SIRUS,NON REINFRCD,LARGE,SET IN SL: Brand: MEDLINE

## (undated) DEVICE — APPLIER CLP L9.375IN APER 2.1MM CLS L3.8MM 20 SM TI CLP

## (undated) DEVICE — PACK-MAJOR

## (undated) DEVICE — DRAPE,EENT,SPLIT,STERILE: Brand: MEDLINE

## (undated) DEVICE — BLADE,CARBON-STEEL,15,STRL,DISPOSABLE,TB: Brand: MEDLINE

## (undated) DEVICE — UNIVERSAL MONOPOLAR LAPAROSCOPIC CABLE 10FT, 4MM PIN CONNECTOR: Brand: CONMED

## (undated) DEVICE — PENCIL SMK EVAC ALL IN 1 DSGN ENH VISIBILITY IMPROVED AIR

## (undated) DEVICE — SUTURE PROL SZ 2-0 L18IN NONABSORBABLE BLU FS L26MM 3/8 CIR 8685H

## (undated) DEVICE — ROYAL SILK SURGICAL GOWN, XXL: Brand: CONVERTORS

## (undated) DEVICE — CODMAN® SURGICAL PATTIES 1/2" X 1/2" (1.27CM X 1.27CM): Brand: CODMAN®

## (undated) DEVICE — GLOVE SURG SZ 75 L12IN FNGR THK94MIL TRNSLUC YEL LTX

## (undated) DEVICE — HYPODERMIC SAFETY NEEDLE: Brand: MAGELLAN

## (undated) DEVICE — HERCULES 3 STAGE BALLOON ESOPHAGEAL: Brand: HERCULES

## (undated) DEVICE — SUTURE MCRYL SZ 4-0 L18IN ABSRB VLT P-3 L13MM 3/8 CIR REV Y464G

## (undated) DEVICE — 450 ML BOTTLE OF 0.05% CHLORHEXIDINE GLUCONATE IN 99.95% STERILE WATER FOR IRRIGATION, USP AND APPLICATOR.: Brand: IRRISEPT ANTIMICROBIAL WOUND LAVAGE

## (undated) DEVICE — TUBE TRACH AD SZ 6 L77MM INNR CANN ID65MM OUTER CANN

## (undated) DEVICE — SUTURE SILK 2-0 CP-1 30IN N ABSRB BRAID BLK 443H

## (undated) DEVICE — SUTURE PERMAHAND SZ 0 L30IN NONABSORBABLE BLK SILK BRAID A306H

## (undated) DEVICE — ESOPHAGEAL/COLONIC/BILIARY WIREGUIDED BALLOON DILATATION CATHETER: Brand: CRE™ PRO

## (undated) DEVICE — KIT,ANTI FOG,W/SPONGE & FLUID,SOFT PACK: Brand: MEDLINE

## (undated) DEVICE — SUTURE VCRL SZ 3-0 L18IN ABSRB VLT SUTUPAK PRECUT W/O NDL J104T

## (undated) DEVICE — SWIVEL BRONCHSCP ANES 15 MM RUBBER CAP FIBEROPTIC SIL REUSE

## (undated) DEVICE — PAD,NON-ADHERENT,3X8,STERILE,LF,1/PK: Brand: MEDLINE

## (undated) DEVICE — APPLICATOR MEDICATED 26 CC SOLUTION CLR STRL CHLORAPREP

## (undated) DEVICE — SUTURE MCRYL SZ 4 0 L18IN ABSRB UD P 3 3 8 CIR REV CUT NDL MCP494G

## (undated) DEVICE — SUTURE PROL SZ 2-0 L36IN NONABSORBABLE BLU SH L26MM 1/2 CIR 8523H

## (undated) DEVICE — CLIP LIG M TI 6 SIL HNDL FOR OPN AND ENDOSCP SGL APPL

## (undated) DEVICE — Device

## (undated) DEVICE — SUTURE ETHBND EXCEL SZ 0 L30IN NONABSORBABLE GRN CT1 L36MM X424H

## (undated) DEVICE — INTENDED FOR TISSUE SEPARATION, AND OTHER PROCEDURES THAT REQUIRE A SHARP SURGICAL BLADE TO PUNCTURE OR CUT.: Brand: BARD-PARKER ® CARBON RIB-BACK BLADES

## (undated) DEVICE — GUIDE PIN 3.2MM X 343MM: Brand: TRIGEN

## (undated) DEVICE — NEEDLE SPNL L3.5IN PNK HUB S STL REG WALL FIT STYL W/ QNCKE

## (undated) DEVICE — PACK PROCEDURE SURG SET UP SRMC

## (undated) DEVICE — GAUZE,SPONGE,8"X4",12PLY,XRAY,STRL,LF: Brand: MEDLINE

## (undated) DEVICE — GOWN,SIRUS,NONRNF,SETINSLV,XL,20/CS: Brand: MEDLINE

## (undated) DEVICE — CONTAINER,SPECIMEN,PNEU TUBE,4OZ,OR STRL: Brand: MEDLINE

## (undated) DEVICE — BLADE 1882923HRE SKIMMER 22CM 2.9MM M4: Brand: SKIMMER

## (undated) DEVICE — DRAPE,INSTRUMENT,MAGNETIC,10X16: Brand: MEDLINE

## (undated) DEVICE — LIQUIBAND RAPID ADHESIVE 36/CS 0.8ML: Brand: MEDLINE

## (undated) DEVICE — SUTURE PDS + SZ 3 0 L27IN ABSRB VLT L17MM RB 1 1 2 CIR PDP305H

## (undated) DEVICE — FISH HOOKS, W/SUTURE, RUBBER BAND BLUNT, BARBLESS: Brand: DEROYAL

## (undated) DEVICE — SYRINGE MED 10ML TRNSLUC BRL PLUNG BLK MRK POLYPR CTRL

## (undated) DEVICE — CORD,CAUTERY,BIPOLAR,STERILE: Brand: MEDLINE

## (undated) DEVICE — SUTURE MCRYL + SZ 3-0 L27IN ABSRB UD L26MM SH 1/2 CIR MCP416H

## (undated) DEVICE — DRAIN SURG 19FR 0.25IN SIL RND W/ TRCR INDIC DOT RADPQ FULL

## (undated) DEVICE — EVACUATOR SURG 100CC SIL BLB SUCT RESVR FOR CLS WND DRNGE

## (undated) DEVICE — CODMAN® SURGICAL PATTIES 1/2" X 3" (1.27CM X 7.62CM): Brand: CODMAN®

## (undated) DEVICE — Z DISCONTINUED BY MEDLINE NO SUB IDED TUBE NG FEED 8FR L43IN W/ STYL DOBBHOFF

## (undated) DEVICE — APPLIER LIG CLP M L11IN TI STR RNG HNDL FOR 20 CLP DISP

## (undated) DEVICE — DILATION SYRINGE: Brand: COOK

## (undated) DEVICE — 3M™ STERI-DRAPE™ INSTRUMENT POUCH 1018: Brand: STERI-DRAPE™

## (undated) DEVICE — BLADE 1884030 RAD TRICUT 3PK 4MM 22.5CM: Brand: TRICUT®

## (undated) DEVICE — BLADE,CARBON-STEEL,10,STRL,DISPOSABLE,TB: Brand: MEDLINE

## (undated) DEVICE — SUTURE V-LOC 180 SZ 0 L12IN ABSRB GRN L37MM GS-21 1/2 CIR VLOCL0316

## (undated) DEVICE — LOOP VES W25MM THK1MM MAXI RED SIL FLD REPELLENT 100 PER

## (undated) DEVICE — DRAPE,U/ SHT,SPLIT,PLAS,STERIL: Brand: MEDLINE

## (undated) DEVICE — 1LYRTR 16FR10ML100%SILTMPS SNP: Brand: MEDLINE INDUSTRIES, INC.

## (undated) DEVICE — Device: Brand: BIVONA

## (undated) DEVICE — BAG,BANDED,W/RUBBERBAND,STERILE,30X36: Brand: MEDLINE

## (undated) DEVICE — LARYNGOSCOPY - BRONCHOSCOPY: Brand: MEDLINE INDUSTRIES, INC.

## (undated) DEVICE — ESOPHAGEAL/PYLORIC/COLONIC/BILIARY WIREGUIDED BALLOON DILATATION CATHETER: Brand: CRE™ PRO

## (undated) DEVICE — SUTURE ETHLN SZ 2-0 L30IN NONABSORBABLE BLK L36MM FSLX 3/8 1674H

## (undated) DEVICE — DRAPE,TOP,102X53,STERILE: Brand: MEDLINE

## (undated) DEVICE — NEEDLE SPNL 22GA L7IN SPINOCAN

## (undated) DEVICE — SPONGE,PEANUT,XRAY,ST,SM,3/8",5/CARD: Brand: MEDLINE INDUSTRIES, INC.

## (undated) DEVICE — SYRINGE MED 10ML LUERLOCK TIP W/O SFTY DISP

## (undated) DEVICE — DISSECTOR ENDOSCP L21CM TIP CURVATURE 40DEG FN CRV JAW VES

## (undated) DEVICE — SYRINGE, LUER LOCK, 60ML: Brand: MEDLINE

## (undated) DEVICE — GLOVE ORANGE PI 8 1/2   MSG9085

## (undated) DEVICE — BLADE 1884030HRE TRICUT 4MM 22CM M4 IRR: Brand: TRICUT®

## (undated) DEVICE — Z DISCONTINUED NO SUB IDED TUBE TRACH SZ 6 L74MM OD10.8MM ID6.4MM CUFLSS W/ DISP INNR

## (undated) DEVICE — SALEM SUMP DUAL LUMEN STOMACH TUBE MULTI-FUNCTIONAL PORT WITH ENFIT CONNECTION: Brand: SALEM SUMP

## (undated) DEVICE — SUTURE MCRYL + SZ 4-0 L27IN ABSRB UD L19MM PS-2 3/8 CIR MCP426H

## (undated) DEVICE — 4.0MM SHORT PILOT DRILL WITH AO CONNECTOR: Brand: TRIGEN

## (undated) DEVICE — COAGULATOR SUCT 8FR L6IN HNDL FOR ENT PROC

## (undated) DEVICE — MARKER,SKIN,WI/RULER AND LABELS: Brand: MEDLINE

## (undated) DEVICE — DRAINBAG,ANTI-REFLUX TOWER,L/F,2000ML,LL: Brand: MEDLINE

## (undated) DEVICE — JELLY,LUBE,STERILE,FLIP TOP,TUBE,2-OZ: Brand: MEDLINE

## (undated) DEVICE — DRAPE,SPLIT ,77X120: Brand: MEDLINE

## (undated) DEVICE — 6619 2 PTNT ISO SYS INCISE AREA&LT;(&GT;&&LT;)&GT;P: Brand: STERI-DRAPE™ IOBAN™ 2

## (undated) DEVICE — SUTURE MCRYL SZ 3-0 L27IN ABSRB VLT L17MM RB-1 1/2 CIR Y305H

## (undated) DEVICE — INTRODUCER TUBE COUDE TIP AD 15 FRX70 CM CALIB POLYETH DISP

## (undated) DEVICE — APPLICATOR MEDICATED 26 CC SOLUTION HI LT ORNG CHLORAPREP

## (undated) DEVICE — SUTURE VCRL SZ 2-0 L18IN ABSRB VLT POLYGLACTIN 910 BRAID J105T

## (undated) DEVICE — BASIC SINGLE BASIN BTC-LF: Brand: MEDLINE INDUSTRIES, INC.

## (undated) DEVICE — SPONGE LAP W18XL18IN WHT COT 4 PLY FLD STRUNG RADPQ DISP ST

## (undated) DEVICE — SYSTEM SKIN CLSR 22CM DERMBND PRINEO

## (undated) DEVICE — SUTURE VCRL + SZ 2-0 L27IN ABSRB VLT L26MM CT-2 1/2 CIR VCP333H

## (undated) DEVICE — SPONGE LAP W18XL18IN WHT COT 4 PLY FLD STRUNG RADPQ DISP ST 2 PER PACK

## (undated) DEVICE — BLADE ES ELASTOMERIC COAT INSUL DURABLE BEND UPTO 90DEG